# Patient Record
Sex: MALE | Race: BLACK OR AFRICAN AMERICAN | NOT HISPANIC OR LATINO | Employment: OTHER | URBAN - METROPOLITAN AREA
[De-identification: names, ages, dates, MRNs, and addresses within clinical notes are randomized per-mention and may not be internally consistent; named-entity substitution may affect disease eponyms.]

---

## 2018-02-02 ENCOUNTER — HISTORICAL (OUTPATIENT)
Dept: RADIOLOGY | Facility: HOSPITAL | Age: 63
End: 2018-02-02

## 2018-04-19 ENCOUNTER — HOSPITAL ENCOUNTER (OUTPATIENT)
Dept: INTENSIVE CARE | Facility: HOSPITAL | Age: 63
End: 2018-04-20
Attending: INTERNAL MEDICINE | Admitting: INTERNAL MEDICINE

## 2018-04-19 LAB
ABS NEUT (OLG): 6.32 X10(3)/MCL (ref 2.1–9.2)
ALBUMIN SERPL-MCNC: 3.6 GM/DL (ref 3.4–5)
ALBUMIN/GLOB SERPL: 1 RATIO (ref 1–2)
ALP SERPL-CCNC: 132 UNIT/L (ref 45–117)
ALT SERPL-CCNC: 68 UNIT/L (ref 12–78)
AST SERPL-CCNC: 106 UNIT/L (ref 15–37)
BASOPHILS # BLD AUTO: 0.03 X10(3)/MCL
BASOPHILS NFR BLD AUTO: 0 %
BILIRUB SERPL-MCNC: 1 MG/DL (ref 0.2–1)
BILIRUBIN DIRECT+TOT PNL SERPL-MCNC: 0.4 MG/DL
BILIRUBIN DIRECT+TOT PNL SERPL-MCNC: 0.6 MG/DL
BUN SERPL-MCNC: 11 MG/DL (ref 7–18)
CALCIUM SERPL-MCNC: 9 MG/DL (ref 8.5–10.1)
CHLORIDE SERPL-SCNC: 101 MMOL/L (ref 98–107)
CO2 SERPL-SCNC: 29 MMOL/L (ref 21–32)
CREAT SERPL-MCNC: 1 MG/DL (ref 0.6–1.3)
EOSINOPHIL # BLD AUTO: 0.07 X10(3)/MCL
EOSINOPHIL NFR BLD AUTO: 1 %
ERYTHROCYTE [DISTWIDTH] IN BLOOD BY AUTOMATED COUNT: 14 % (ref 11.5–14.5)
ETHANOL SERPL-MCNC: 4 MG/DL
GLOBULIN SER-MCNC: 4.1 GM/ML (ref 2.3–3.5)
GLUCOSE SERPL-MCNC: 90 MG/DL (ref 74–106)
HCT VFR BLD AUTO: 41 % (ref 40–51)
HGB BLD-MCNC: 13.8 GM/DL (ref 13.5–17.5)
IMM GRANULOCYTES # BLD AUTO: 0.02 10*3/UL
IMM GRANULOCYTES NFR BLD AUTO: 0 %
LYMPHOCYTES # BLD AUTO: 1.06 X10(3)/MCL
LYMPHOCYTES NFR BLD AUTO: 13 % (ref 13–40)
MAGNESIUM SERPL-MCNC: 1.7 MG/DL (ref 1.8–2.4)
MCH RBC QN AUTO: 31.1 PG (ref 26–34)
MCHC RBC AUTO-ENTMCNC: 33.7 GM/DL (ref 31–37)
MCV RBC AUTO: 92.3 FL (ref 80–100)
MONOCYTES # BLD AUTO: 0.78 X10(3)/MCL
MONOCYTES NFR BLD AUTO: 9 % (ref 4–12)
NEUTROPHILS # BLD AUTO: 6.32 X10(3)/MCL
NEUTROPHILS NFR BLD AUTO: 76 X10(3)/MCL
PHOSPHATE SERPL-MCNC: 3.5 MG/DL (ref 2.5–4.9)
PLATELET # BLD AUTO: 148 X10(3)/MCL (ref 130–400)
PMV BLD AUTO: 10.2 FL (ref 7.4–10.4)
POC TROPONIN: 0.01 NG/ML (ref 0–0.08)
POTASSIUM SERPL-SCNC: 3.6 MMOL/L (ref 3.5–5.1)
PROT SERPL-MCNC: 7.7 GM/DL (ref 6.4–8.2)
RBC # BLD AUTO: 4.44 X10(6)/MCL (ref 4.5–5.9)
SODIUM SERPL-SCNC: 136 MMOL/L (ref 136–145)
WBC # SPEC AUTO: 8.3 X10(3)/MCL (ref 4.5–11)

## 2018-04-20 LAB
ABS NEUT (OLG): 4.03 X10(3)/MCL (ref 2.1–9.2)
ALBUMIN SERPL-MCNC: 3.5 GM/DL (ref 3.4–5)
ALBUMIN/GLOB SERPL: 1 RATIO (ref 1–2)
ALP SERPL-CCNC: 135 UNIT/L (ref 45–117)
ALT SERPL-CCNC: 64 UNIT/L (ref 12–78)
AST SERPL-CCNC: 86 UNIT/L (ref 15–37)
BASOPHILS # BLD AUTO: 0.03 X10(3)/MCL
BASOPHILS NFR BLD AUTO: 0 %
BILIRUB SERPL-MCNC: 1.7 MG/DL (ref 0.2–1)
BILIRUBIN DIRECT+TOT PNL SERPL-MCNC: 0.6 MG/DL
BILIRUBIN DIRECT+TOT PNL SERPL-MCNC: 1.1 MG/DL
BUN SERPL-MCNC: 13 MG/DL (ref 7–18)
CALCIUM SERPL-MCNC: 8.9 MG/DL (ref 8.5–10.1)
CHLORIDE SERPL-SCNC: 102 MMOL/L (ref 98–107)
CO2 SERPL-SCNC: 28 MMOL/L (ref 21–32)
CREAT SERPL-MCNC: 1 MG/DL (ref 0.6–1.3)
EOSINOPHIL # BLD AUTO: 0.1 X10(3)/MCL
EOSINOPHIL NFR BLD AUTO: 1 %
ERYTHROCYTE [DISTWIDTH] IN BLOOD BY AUTOMATED COUNT: 14.2 % (ref 11.5–14.5)
GLOBULIN SER-MCNC: 4.2 GM/ML (ref 2.3–3.5)
GLUCOSE SERPL-MCNC: 91 MG/DL (ref 74–106)
HCT VFR BLD AUTO: 41.8 % (ref 40–51)
HGB BLD-MCNC: 14 GM/DL (ref 13.5–17.5)
IMM GRANULOCYTES # BLD AUTO: 0.01 10*3/UL
IMM GRANULOCYTES NFR BLD AUTO: 0 %
LYMPHOCYTES # BLD AUTO: 1.92 X10(3)/MCL
LYMPHOCYTES NFR BLD AUTO: 27 % (ref 13–40)
MCH RBC QN AUTO: 30.8 PG (ref 26–34)
MCHC RBC AUTO-ENTMCNC: 33.5 GM/DL (ref 31–37)
MCV RBC AUTO: 91.9 FL (ref 80–100)
MONOCYTES # BLD AUTO: 0.91 X10(3)/MCL
MONOCYTES NFR BLD AUTO: 13 % (ref 4–12)
NEUTROPHILS # BLD AUTO: 4.03 X10(3)/MCL
NEUTROPHILS NFR BLD AUTO: 58 X10(3)/MCL
PLATELET # BLD AUTO: 149 X10(3)/MCL (ref 130–400)
PMV BLD AUTO: 10.9 FL (ref 7.4–10.4)
POTASSIUM SERPL-SCNC: 3.7 MMOL/L (ref 3.5–5.1)
PROT SERPL-MCNC: 7.7 GM/DL (ref 6.4–8.2)
RBC # BLD AUTO: 4.55 X10(6)/MCL (ref 4.5–5.9)
SODIUM SERPL-SCNC: 136 MMOL/L (ref 136–145)
WBC # SPEC AUTO: 7 X10(3)/MCL (ref 4.5–11)

## 2018-07-03 ENCOUNTER — HISTORICAL (OUTPATIENT)
Dept: RESPIRATORY THERAPY | Facility: HOSPITAL | Age: 63
End: 2018-07-03

## 2018-07-25 ENCOUNTER — HISTORICAL (OUTPATIENT)
Dept: ADMINISTRATIVE | Facility: HOSPITAL | Age: 63
End: 2018-07-25

## 2018-07-25 LAB
INR PPP: 2.94 (ref 0.9–1.2)
PROTHROMBIN TIME: 29.2 SECOND(S) (ref 11.9–14.4)

## 2018-11-29 ENCOUNTER — HISTORICAL (OUTPATIENT)
Dept: CARDIOLOGY | Facility: CLINIC | Age: 63
End: 2018-11-29

## 2019-02-06 ENCOUNTER — HOSPITAL ENCOUNTER (OUTPATIENT)
Dept: MEDSURG UNIT | Facility: HOSPITAL | Age: 64
End: 2019-02-09
Attending: INTERNAL MEDICINE | Admitting: INTERNAL MEDICINE

## 2019-02-06 LAB
ABS NEUT (OLG): 4.1 X10(3)/MCL (ref 2.1–9.2)
ALBUMIN SERPL-MCNC: 3.6 GM/DL (ref 3.4–5)
ALBUMIN/GLOB SERPL: 0.8 RATIO (ref 1.1–2)
ALP SERPL-CCNC: 105 UNIT/L (ref 45–117)
ALT SERPL-CCNC: 33 UNIT/L (ref 12–78)
APPEARANCE, UA: ABNORMAL
APTT PPP: 28.1 SECOND(S) (ref 23.3–37)
AST SERPL-CCNC: 43 UNIT/L (ref 15–37)
BACTERIA #/AREA URNS AUTO: ABNORMAL /[HPF]
BASOPHILS # BLD AUTO: 0.03 X10(3)/MCL
BASOPHILS NFR BLD AUTO: 0 %
BILIRUB SERPL-MCNC: 0.7 MG/DL (ref 0.2–1)
BILIRUB UR QL STRIP: NEGATIVE
BILIRUBIN DIRECT+TOT PNL SERPL-MCNC: 0.3 MG/DL
BILIRUBIN DIRECT+TOT PNL SERPL-MCNC: 0.4 MG/DL
BUN SERPL-MCNC: 13 MG/DL (ref 7–18)
CALCIUM SERPL-MCNC: 8.9 MG/DL (ref 8.5–10.1)
CHLORIDE SERPL-SCNC: 102 MMOL/L (ref 98–107)
CK MB SERPL-MCNC: <1 NG/ML (ref 1–3.6)
CK SERPL-CCNC: 59 UNIT/L (ref 39–308)
CO2 SERPL-SCNC: 27 MMOL/L (ref 21–32)
COLOR UR: YELLOW
CREAT SERPL-MCNC: 0.8 MG/DL (ref 0.6–1.3)
EOSINOPHIL # BLD AUTO: 0.1 X10(3)/MCL
EOSINOPHIL NFR BLD AUTO: 2 %
ERYTHROCYTE [DISTWIDTH] IN BLOOD BY AUTOMATED COUNT: 15.5 % (ref 11.5–14.5)
GLOBULIN SER-MCNC: 4.3 GM/ML (ref 2.3–3.5)
GLUCOSE (UA): NORMAL
GLUCOSE SERPL-MCNC: 80 MG/DL (ref 74–106)
HCT VFR BLD AUTO: 38.9 % (ref 40–51)
HGB BLD-MCNC: 13 GM/DL (ref 13.5–17.5)
HGB UR QL STRIP: 0.03 MG/DL
HYALINE CASTS #/AREA URNS LPF: ABNORMAL /[LPF]
IMM GRANULOCYTES # BLD AUTO: 0.02 10*3/UL
IMM GRANULOCYTES NFR BLD AUTO: 0 %
INR PPP: 1.08 (ref 0.9–1.2)
KETONES UR QL STRIP: NEGATIVE
LEUKOCYTE ESTERASE UR QL STRIP: 500 LEU/UL
LYMPHOCYTES # BLD AUTO: 1.66 X10(3)/MCL
LYMPHOCYTES NFR BLD AUTO: 25 % (ref 13–40)
MAGNESIUM SERPL-MCNC: 1.9 MG/DL (ref 1.6–2.6)
MCH RBC QN AUTO: 32.1 PG (ref 26–34)
MCHC RBC AUTO-ENTMCNC: 33.4 GM/DL (ref 31–37)
MCV RBC AUTO: 96 FL (ref 80–100)
MONOCYTES # BLD AUTO: 0.71 X10(3)/MCL
MONOCYTES NFR BLD AUTO: 11 % (ref 4–12)
NEUTROPHILS # BLD AUTO: 4.1 X10(3)/MCL
NEUTROPHILS NFR BLD AUTO: 62 X10(3)/MCL
NITRITE UR QL STRIP: ABNORMAL
PH UR STRIP: 6 [PH] (ref 4.5–8)
PLATELET # BLD AUTO: 194 X10(3)/MCL (ref 130–400)
PMV BLD AUTO: 10.3 FL (ref 7.4–10.4)
POTASSIUM SERPL-SCNC: 4.1 MMOL/L (ref 3.5–5.1)
PROT SERPL-MCNC: 7.9 GM/DL (ref 6.4–8.2)
PROT UR QL STRIP: 20 MG/DL
PROTHROMBIN TIME: 13.9 SECOND(S) (ref 11.9–14.4)
RBC # BLD AUTO: 4.05 X10(6)/MCL (ref 4.5–5.9)
RBC #/AREA URNS AUTO: ABNORMAL /[HPF]
SODIUM SERPL-SCNC: 137 MMOL/L (ref 136–145)
SP GR UR STRIP: 1.01 (ref 1–1.03)
SQUAMOUS #/AREA URNS LPF: ABNORMAL /[LPF]
TROPONIN I SERPL-MCNC: <0.015 NG/ML (ref 0–0.05)
UROBILINOGEN UR STRIP-ACNC: NORMAL
WBC # SPEC AUTO: 6.6 X10(3)/MCL (ref 4.5–11)
WBC #/AREA URNS AUTO: >=100 /HPF

## 2019-02-07 LAB
ABS NEUT (OLG): 3.44 X10(3)/MCL (ref 2.1–9.2)
BASOPHILS # BLD AUTO: 0.03 X10(3)/MCL
BASOPHILS NFR BLD AUTO: 0 %
BUN SERPL-MCNC: 13 MG/DL (ref 7–18)
CALCIUM SERPL-MCNC: 9.1 MG/DL (ref 8.5–10.1)
CHLORIDE SERPL-SCNC: 99 MMOL/L (ref 98–107)
CHOLEST SERPL-MCNC: 192 MG/DL
CHOLEST/HDLC SERPL: 1.5 {RATIO} (ref 0–5)
CO2 SERPL-SCNC: 31 MMOL/L (ref 21–32)
COLOR STL: NORMAL
CONSISTENCY STL: NORMAL
CREAT SERPL-MCNC: 1 MG/DL (ref 0.6–1.3)
CREAT/UREA NIT SERPL: 13
EOSINOPHIL # BLD AUTO: 0.08 X10(3)/MCL
EOSINOPHIL NFR BLD AUTO: 1 %
ERYTHROCYTE [DISTWIDTH] IN BLOOD BY AUTOMATED COUNT: 15.1 % (ref 11.5–14.5)
GLUCOSE SERPL-MCNC: 84 MG/DL (ref 74–106)
HCT VFR BLD AUTO: 41.7 % (ref 40–51)
HDLC SERPL-MCNC: 129 MG/DL
HEMOCCULT SP1 STL QL: NEGATIVE
HEMOCCULT SP2 STL QL: NEGATIVE
HGB BLD-MCNC: 13.8 GM/DL (ref 13.5–17.5)
IMM GRANULOCYTES # BLD AUTO: 0.02 10*3/UL
IMM GRANULOCYTES NFR BLD AUTO: 0 %
INR PPP: 1.33 (ref 0.9–1.2)
LDLC SERPL CALC-MCNC: 46 MG/DL (ref 0–130)
LYMPHOCYTES # BLD AUTO: 1.34 X10(3)/MCL
LYMPHOCYTES NFR BLD AUTO: 24 % (ref 13–40)
MCH RBC QN AUTO: 31.6 PG (ref 26–34)
MCHC RBC AUTO-ENTMCNC: 33.1 GM/DL (ref 31–37)
MCV RBC AUTO: 95.4 FL (ref 80–100)
MONOCYTES # BLD AUTO: 0.78 X10(3)/MCL
MONOCYTES NFR BLD AUTO: 14 % (ref 4–12)
NEUTROPHILS # BLD AUTO: 3.44 X10(3)/MCL
NEUTROPHILS NFR BLD AUTO: 60 X10(3)/MCL
PLATELET # BLD AUTO: 173 X10(3)/MCL (ref 130–400)
PMV BLD AUTO: 10.9 FL (ref 7.4–10.4)
POTASSIUM SERPL-SCNC: 4 MMOL/L (ref 3.5–5.1)
PROTHROMBIN TIME: 16.4 SECOND(S) (ref 11.9–14.4)
RBC # BLD AUTO: 4.37 X10(6)/MCL (ref 4.5–5.9)
SODIUM SERPL-SCNC: 135 MMOL/L (ref 136–145)
T4 FREE SERPL-MCNC: 1.01 NG/DL (ref 0.76–1.46)
TRIGL SERPL-MCNC: 85 MG/DL
TSH SERPL-ACNC: 1.54 MIU/L (ref 0.36–3.74)
VLDLC SERPL CALC-MCNC: 17 MG/DL
WBC # SPEC AUTO: 5.7 X10(3)/MCL (ref 4.5–11)

## 2019-02-08 LAB
ABS NEUT (OLG): 4.22 X10(3)/MCL (ref 2.1–9.2)
BASOPHILS # BLD AUTO: 0.02 X10(3)/MCL
BASOPHILS NFR BLD AUTO: 0 %
BUN SERPL-MCNC: 14 MG/DL (ref 7–18)
CALCIUM SERPL-MCNC: 8.8 MG/DL (ref 8.5–10.1)
CHLORIDE SERPL-SCNC: 101 MMOL/L (ref 98–107)
CO2 SERPL-SCNC: 28 MMOL/L (ref 21–32)
CREAT SERPL-MCNC: 0.9 MG/DL (ref 0.6–1.3)
CREAT/UREA NIT SERPL: 16
EOSINOPHIL # BLD AUTO: 0.09 X10(3)/MCL
EOSINOPHIL NFR BLD AUTO: 1 %
ERYTHROCYTE [DISTWIDTH] IN BLOOD BY AUTOMATED COUNT: 14.7 % (ref 11.5–14.5)
GLUCOSE SERPL-MCNC: 97 MG/DL (ref 74–106)
HCT VFR BLD AUTO: 39.8 % (ref 40–51)
HGB BLD-MCNC: 13.3 GM/DL (ref 13.5–17.5)
IMM GRANULOCYTES # BLD AUTO: 0.02 10*3/UL
IMM GRANULOCYTES NFR BLD AUTO: 0 %
INR PPP: 1.58 (ref 0.9–1.2)
LYMPHOCYTES # BLD AUTO: 1.43 X10(3)/MCL
LYMPHOCYTES NFR BLD AUTO: 22 % (ref 13–40)
MCH RBC QN AUTO: 32 PG (ref 26–34)
MCHC RBC AUTO-ENTMCNC: 33.4 GM/DL (ref 31–37)
MCV RBC AUTO: 95.9 FL (ref 80–100)
MONOCYTES # BLD AUTO: 0.65 X10(3)/MCL
MONOCYTES NFR BLD AUTO: 10 % (ref 4–12)
NEUTROPHILS # BLD AUTO: 4.22 X10(3)/MCL
NEUTROPHILS NFR BLD AUTO: 66 X10(3)/MCL
PLATELET # BLD AUTO: 159 X10(3)/MCL (ref 130–400)
PMV BLD AUTO: 10.8 FL (ref 7.4–10.4)
POTASSIUM SERPL-SCNC: 3.8 MMOL/L (ref 3.5–5.1)
PROTHROMBIN TIME: 18.8 SECOND(S) (ref 11.9–14.4)
RBC # BLD AUTO: 4.15 X10(6)/MCL (ref 4.5–5.9)
SODIUM SERPL-SCNC: 136 MMOL/L (ref 136–145)
WBC # SPEC AUTO: 6.4 X10(3)/MCL (ref 4.5–11)

## 2019-02-09 LAB
ABS NEUT (OLG): 5.38 X10(3)/MCL (ref 2.1–9.2)
BASOPHILS # BLD AUTO: 0.03 X10(3)/MCL
BASOPHILS NFR BLD AUTO: 0 %
BUN SERPL-MCNC: 11 MG/DL (ref 7–18)
CALCIUM SERPL-MCNC: 8.6 MG/DL (ref 8.5–10.1)
CHLORIDE SERPL-SCNC: 102 MMOL/L (ref 98–107)
CO2 SERPL-SCNC: 29 MMOL/L (ref 21–32)
CREAT SERPL-MCNC: 0.8 MG/DL (ref 0.6–1.3)
CREAT/UREA NIT SERPL: 14
EOSINOPHIL # BLD AUTO: 0.12 X10(3)/MCL
EOSINOPHIL NFR BLD AUTO: 2 %
ERYTHROCYTE [DISTWIDTH] IN BLOOD BY AUTOMATED COUNT: 14.9 % (ref 11.5–14.5)
GLUCOSE SERPL-MCNC: 97 MG/DL (ref 74–106)
HCT VFR BLD AUTO: 37.5 % (ref 40–51)
HGB BLD-MCNC: 12.4 GM/DL (ref 13.5–17.5)
IMM GRANULOCYTES # BLD AUTO: 0.02 10*3/UL
IMM GRANULOCYTES NFR BLD AUTO: 0 %
INR PPP: 1.86 (ref 0.9–1.2)
LYMPHOCYTES # BLD AUTO: 1.58 X10(3)/MCL
LYMPHOCYTES NFR BLD AUTO: 20 % (ref 13–40)
MCH RBC QN AUTO: 31.7 PG (ref 26–34)
MCHC RBC AUTO-ENTMCNC: 33.1 GM/DL (ref 31–37)
MCV RBC AUTO: 95.9 FL (ref 80–100)
MONOCYTES # BLD AUTO: 0.73 X10(3)/MCL
MONOCYTES NFR BLD AUTO: 9 % (ref 4–12)
NEUTROPHILS # BLD AUTO: 5.38 X10(3)/MCL
NEUTROPHILS NFR BLD AUTO: 68 X10(3)/MCL
PLATELET # BLD AUTO: 144 X10(3)/MCL (ref 130–400)
PMV BLD AUTO: 11.2 FL (ref 7.4–10.4)
POTASSIUM SERPL-SCNC: 3.8 MMOL/L (ref 3.5–5.1)
PROTHROMBIN TIME: 21.4 SECOND(S) (ref 11.9–14.4)
RBC # BLD AUTO: 3.91 X10(6)/MCL (ref 4.5–5.9)
SODIUM SERPL-SCNC: 138 MMOL/L (ref 136–145)
WBC # SPEC AUTO: 7.9 X10(3)/MCL (ref 4.5–11)

## 2020-01-31 ENCOUNTER — HISTORICAL (OUTPATIENT)
Dept: INTERNAL MEDICINE | Facility: CLINIC | Age: 65
End: 2020-01-31

## 2020-01-31 LAB
ABS NEUT (OLG): 1.82 X10(3)/MCL (ref 2.1–9.2)
ALBUMIN SERPL-MCNC: 3.7 GM/DL (ref 3.4–5)
ALBUMIN/GLOB SERPL: 0.9 RATIO (ref 1.1–2)
ALP SERPL-CCNC: 120 UNIT/L (ref 45–117)
ALT SERPL-CCNC: 24 UNIT/L (ref 12–78)
AST SERPL-CCNC: 30 UNIT/L (ref 15–37)
BASOPHILS # BLD AUTO: 0 X10(3)/MCL (ref 0–0.2)
BASOPHILS NFR BLD AUTO: 1 %
BILIRUB SERPL-MCNC: 0.6 MG/DL (ref 0.2–1)
BILIRUBIN DIRECT+TOT PNL SERPL-MCNC: 0.3 MG/DL
BILIRUBIN DIRECT+TOT PNL SERPL-MCNC: 0.3 MG/DL (ref 0–0.2)
BUN SERPL-MCNC: 10 MG/DL (ref 7–18)
CALCIUM SERPL-MCNC: 9.1 MG/DL (ref 8.5–10.1)
CHLORIDE SERPL-SCNC: 108 MMOL/L (ref 98–107)
CHOLEST SERPL-MCNC: 185 MG/DL
CHOLEST/HDLC SERPL: 2 {RATIO} (ref 0–5)
CO2 SERPL-SCNC: 31 MMOL/L (ref 21–32)
CREAT SERPL-MCNC: 0.9 MG/DL (ref 0.6–1.3)
EOSINOPHIL # BLD AUTO: 0.1 X10(3)/MCL (ref 0–0.9)
EOSINOPHIL NFR BLD AUTO: 3 %
ERYTHROCYTE [DISTWIDTH] IN BLOOD BY AUTOMATED COUNT: 16.8 % (ref 11.5–14.5)
EST. AVERAGE GLUCOSE BLD GHB EST-MCNC: 117 MG/DL
FOLATE SERPL-MCNC: 11.7 NG/ML (ref 3.1–17.5)
GLOBULIN SER-MCNC: 4.1 GM/ML (ref 2.3–3.5)
GLUCOSE SERPL-MCNC: 71 MG/DL (ref 74–106)
HBA1C MFR BLD: 5.7 % (ref 4.2–6.3)
HCT VFR BLD AUTO: 43.5 % (ref 40–51)
HDLC SERPL-MCNC: 94 MG/DL (ref 40–59)
HGB BLD-MCNC: 13.8 GM/DL (ref 13.5–17.5)
IMM GRANULOCYTES # BLD AUTO: 0.01 10*3/UL
IMM GRANULOCYTES NFR BLD AUTO: 0 %
LDLC SERPL CALC-MCNC: 77 MG/DL
LYMPHOCYTES # BLD AUTO: 1.3 X10(3)/MCL (ref 0.6–4.6)
LYMPHOCYTES NFR BLD AUTO: 35 %
MCH RBC QN AUTO: 31.1 PG (ref 26–34)
MCHC RBC AUTO-ENTMCNC: 31.7 GM/DL (ref 31–37)
MCV RBC AUTO: 98 FL (ref 80–100)
MONOCYTES # BLD AUTO: 0.5 X10(3)/MCL (ref 0.1–1.3)
MONOCYTES NFR BLD AUTO: 14 %
NEUTROPHILS # BLD AUTO: 1.82 X10(3)/MCL (ref 2.1–9.2)
NEUTROPHILS NFR BLD AUTO: 48 %
PLATELET # BLD AUTO: 173 X10(3)/MCL (ref 130–400)
PMV BLD AUTO: 10.6 FL (ref 7.4–10.4)
POTASSIUM SERPL-SCNC: 4.5 MMOL/L (ref 3.5–5.1)
PROT SERPL-MCNC: 7.8 GM/DL (ref 6.4–8.2)
RBC # BLD AUTO: 4.44 X10(6)/MCL (ref 4.5–5.9)
SODIUM SERPL-SCNC: 142 MMOL/L (ref 136–145)
TRIGL SERPL-MCNC: 70 MG/DL
VIT B12 SERPL-MCNC: 582 PG/ML (ref 193–986)
VLDLC SERPL CALC-MCNC: 14 MG/DL
WBC # SPEC AUTO: 3.8 X10(3)/MCL (ref 4.5–11)

## 2020-10-07 ENCOUNTER — HISTORICAL (OUTPATIENT)
Dept: INTERNAL MEDICINE | Facility: CLINIC | Age: 65
End: 2020-10-07

## 2020-10-07 LAB
ABS NEUT (OLG): 2.85 X10(3)/MCL (ref 2.1–9.2)
ALBUMIN SERPL-MCNC: 4 GM/DL (ref 3.4–5)
ALBUMIN/GLOB SERPL: 0.9 RATIO (ref 1.1–2)
ALP SERPL-CCNC: 144 UNIT/L (ref 45–117)
ALT SERPL-CCNC: 74 UNIT/L (ref 12–78)
AST SERPL-CCNC: 118 UNIT/L (ref 15–37)
BASOPHILS # BLD AUTO: 0 X10(3)/MCL (ref 0–0.2)
BASOPHILS NFR BLD AUTO: 1 %
BILIRUB SERPL-MCNC: 1.1 MG/DL (ref 0.2–1)
BILIRUBIN DIRECT+TOT PNL SERPL-MCNC: 0.5 MG/DL (ref 0–0.2)
BILIRUBIN DIRECT+TOT PNL SERPL-MCNC: 0.6 MG/DL
BUN SERPL-MCNC: 6 MG/DL (ref 7–18)
CALCIUM SERPL-MCNC: 9.9 MG/DL (ref 8.5–10.1)
CHLORIDE SERPL-SCNC: 100 MMOL/L (ref 98–107)
CHOLEST SERPL-MCNC: 171 MG/DL
CHOLEST/HDLC SERPL: 1.4 {RATIO} (ref 0–5)
CO2 SERPL-SCNC: 30 MMOL/L (ref 21–32)
CREAT SERPL-MCNC: 0.8 MG/DL (ref 0.6–1.3)
DEPRECATED CALCIDIOL+CALCIFEROL SERPL-MC: 37.3 NG/ML (ref 30–80)
EOSINOPHIL # BLD AUTO: 0.1 X10(3)/MCL (ref 0–0.9)
EOSINOPHIL NFR BLD AUTO: 2 %
ERYTHROCYTE [DISTWIDTH] IN BLOOD BY AUTOMATED COUNT: 13.8 % (ref 11.5–14.5)
EST. AVERAGE GLUCOSE BLD GHB EST-MCNC: 117 MG/DL
FOLATE SERPL-MCNC: 9.7 NG/ML (ref 3.1–17.5)
GLOBULIN SER-MCNC: 4.4 GM/ML (ref 2.3–3.5)
GLUCOSE SERPL-MCNC: 79 MG/DL (ref 74–106)
HBA1C MFR BLD: 5.7 % (ref 4.2–6.3)
HCT VFR BLD AUTO: 42.1 % (ref 40–51)
HDLC SERPL-MCNC: 123 MG/DL (ref 40–59)
HGB BLD-MCNC: 13.8 GM/DL (ref 13.5–17.5)
IMM GRANULOCYTES # BLD AUTO: 0.02 10*3/UL
IMM GRANULOCYTES NFR BLD AUTO: 0 %
LDLC SERPL CALC-MCNC: 37 MG/DL
LYMPHOCYTES # BLD AUTO: 1.5 X10(3)/MCL (ref 0.6–4.6)
LYMPHOCYTES NFR BLD AUTO: 30 %
MCH RBC QN AUTO: 30.9 PG (ref 26–34)
MCHC RBC AUTO-ENTMCNC: 32.8 GM/DL (ref 31–37)
MCV RBC AUTO: 94.2 FL (ref 80–100)
MONOCYTES # BLD AUTO: 0.5 X10(3)/MCL (ref 0.1–1.3)
MONOCYTES NFR BLD AUTO: 10 %
NEUTROPHILS # BLD AUTO: 2.85 X10(3)/MCL (ref 2.1–9.2)
NEUTROPHILS NFR BLD AUTO: 58 %
PLATELET # BLD AUTO: 171 X10(3)/MCL (ref 130–400)
PMV BLD AUTO: 10.8 FL (ref 7.4–10.4)
POTASSIUM SERPL-SCNC: 4.3 MMOL/L (ref 3.5–5.1)
PROT SERPL-MCNC: 8.4 GM/DL (ref 6.4–8.2)
RBC # BLD AUTO: 4.47 X10(6)/MCL (ref 4.5–5.9)
SODIUM SERPL-SCNC: 137 MMOL/L (ref 136–145)
TRIGL SERPL-MCNC: 56 MG/DL
VIT B12 SERPL-MCNC: 1096 PG/ML (ref 193–986)
VLDLC SERPL CALC-MCNC: 11 MG/DL
WBC # SPEC AUTO: 5 X10(3)/MCL (ref 4.5–11)

## 2021-07-08 ENCOUNTER — HISTORICAL (OUTPATIENT)
Dept: RESPIRATORY THERAPY | Facility: HOSPITAL | Age: 66
End: 2021-07-08

## 2021-08-24 ENCOUNTER — HISTORICAL (OUTPATIENT)
Dept: ADMINISTRATIVE | Facility: HOSPITAL | Age: 66
End: 2021-08-24

## 2021-08-24 LAB — SARS-COV-2 RNA RESP QL NAA+PROBE: NOT DETECTED

## 2021-09-13 ENCOUNTER — HISTORICAL (OUTPATIENT)
Dept: ADMINISTRATIVE | Facility: HOSPITAL | Age: 66
End: 2021-09-13

## 2021-10-05 ENCOUNTER — HISTORICAL (OUTPATIENT)
Dept: ADMINISTRATIVE | Facility: HOSPITAL | Age: 66
End: 2021-10-05

## 2021-11-15 ENCOUNTER — HOSPITAL ENCOUNTER (OUTPATIENT)
Dept: MEDSURG UNIT | Facility: HOSPITAL | Age: 66
End: 2021-11-17
Attending: INTERNAL MEDICINE | Admitting: INTERNAL MEDICINE

## 2021-11-15 LAB
ABS NEUT (OLG): 3.14 X10(3)/MCL (ref 2.1–9.2)
ALBUMIN SERPL-MCNC: 3.7 GM/DL (ref 3.4–4.8)
ALBUMIN/GLOB SERPL: 1.1 RATIO (ref 1.1–2)
ALP SERPL-CCNC: 103 UNIT/L (ref 40–150)
ALT SERPL-CCNC: 11 UNIT/L (ref 0–55)
AMPHET UR QL SCN: NEGATIVE
APPEARANCE, UA: CLEAR
APTT PPP: 51.7 SECOND(S) (ref 23.3–37)
AST SERPL-CCNC: 24 UNIT/L (ref 5–34)
BACTERIA #/AREA URNS AUTO: ABNORMAL /HPF
BARBITURATE SCN PRESENT UR: NEGATIVE
BASOPHILS # BLD AUTO: 0 X10(3)/MCL (ref 0–0.2)
BASOPHILS NFR BLD AUTO: 0 %
BENZODIAZ UR QL SCN: NEGATIVE
BILIRUB SERPL-MCNC: 3.1 MG/DL
BILIRUB UR QL STRIP: NEGATIVE
BILIRUBIN DIRECT+TOT PNL SERPL-MCNC: 1.2 MG/DL (ref 0–0.5)
BILIRUBIN DIRECT+TOT PNL SERPL-MCNC: 1.9 MG/DL (ref 0–0.8)
BNP BLD-MCNC: 1153 PG/ML
BUN SERPL-MCNC: 10.8 MG/DL (ref 8.4–25.7)
BUN SERPL-MCNC: 13.6 MG/DL (ref 8.4–25.7)
CALCIUM SERPL-MCNC: 9.3 MG/DL (ref 8.7–10.5)
CALCIUM SERPL-MCNC: 9.4 MG/DL (ref 8.7–10.5)
CANNABINOIDS UR QL SCN: NEGATIVE
CHLORIDE SERPL-SCNC: 104 MMOL/L (ref 98–107)
CHLORIDE SERPL-SCNC: 104 MMOL/L (ref 98–107)
CK MB SERPL-MCNC: 1.2 NG/ML
CK SERPL-CCNC: 71 U/L (ref 30–200)
CO2 SERPL-SCNC: 26 MMOL/L (ref 23–31)
CO2 SERPL-SCNC: 28 MMOL/L (ref 23–31)
COCAINE UR QL SCN: NEGATIVE
COLOR UR: ABNORMAL
CREAT SERPL-MCNC: 0.88 MG/DL (ref 0.73–1.18)
CREAT SERPL-MCNC: 1.03 MG/DL (ref 0.73–1.18)
CREAT/UREA NIT SERPL: 12
D DIMER PPP IA.FEU-MCNC: 0.55 MCG/ML FEU
EOSINOPHIL # BLD AUTO: 0 X10(3)/MCL (ref 0–0.9)
EOSINOPHIL NFR BLD AUTO: 1 %
ERYTHROCYTE [DISTWIDTH] IN BLOOD BY AUTOMATED COUNT: 16.3 % (ref 11.5–14.5)
EST. AVERAGE GLUCOSE BLD GHB EST-MCNC: 88.2 MG/DL
FENTANYL UR QL SCN: NEGATIVE
FLUAV AG UPPER RESP QL IA.RAPID: NEGATIVE
FLUBV AG UPPER RESP QL IA.RAPID: NEGATIVE
GLOBULIN SER-MCNC: 3.3 GM/DL (ref 2.4–3.5)
GLUCOSE (UA): NEGATIVE
GLUCOSE SERPL-MCNC: 91 MG/DL (ref 82–115)
GLUCOSE SERPL-MCNC: 93 MG/DL (ref 82–115)
HAV IGM SERPL QL IA: NONREACTIVE
HBA1C MFR BLD: 4.7 %
HBV CORE IGM SERPL QL IA: NONREACTIVE
HBV SURFACE AG SERPL QL IA: NONREACTIVE
HCT VFR BLD AUTO: 35.8 % (ref 40–51)
HCV AB SERPL QL IA: NONREACTIVE
HGB BLD-MCNC: 11.1 GM/DL (ref 13.5–17.5)
HGB UR QL STRIP: NEGATIVE
HIV 1+2 AB+HIV1 P24 AG SERPL QL IA: NONREACTIVE
HYALINE CASTS #/AREA URNS LPF: ABNORMAL /LPF
IMM GRANULOCYTES # BLD AUTO: 0.02 10*3/UL
IMM GRANULOCYTES NFR BLD AUTO: 0 %
INR PPP: 3.07 (ref 0.9–1.2)
KETONES UR QL STRIP: NEGATIVE
LEUKOCYTE ESTERASE UR QL STRIP: 500 LEU/UL
LYMPHOCYTES # BLD AUTO: 1.5 X10(3)/MCL (ref 0.6–4.6)
LYMPHOCYTES NFR BLD AUTO: 29 %
MAGNESIUM SERPL-MCNC: 2.1 MG/DL (ref 1.6–2.6)
MCH RBC QN AUTO: 30.3 PG (ref 26–34)
MCHC RBC AUTO-ENTMCNC: 31 GM/DL (ref 31–37)
MCV RBC AUTO: 97.8 FL (ref 80–100)
MDMA UR QL SCN: NEGATIVE
MONOCYTES # BLD AUTO: 0.6 X10(3)/MCL (ref 0.1–1.3)
MONOCYTES NFR BLD AUTO: 11 %
NEUTROPHILS # BLD AUTO: 3.14 X10(3)/MCL (ref 2.1–9.2)
NEUTROPHILS NFR BLD AUTO: 59 %
NITRITE UR QL STRIP: NEGATIVE
NRBC BLD AUTO-RTO: 0 % (ref 0–0.2)
OPIATES UR QL SCN: NEGATIVE
PCP UR QL: NEGATIVE
PH UR STRIP.AUTO: 7.5 [PH] (ref 3–11)
PH UR STRIP: 7.5 [PH] (ref 4.5–8)
PHOSPHATE SERPL-MCNC: 3.3 MG/DL (ref 2.3–4.7)
PLATELET # BLD AUTO: 186 X10(3)/MCL (ref 130–400)
PMV BLD AUTO: 10.9 FL (ref 7.4–10.4)
POTASSIUM SERPL-SCNC: 3.1 MMOL/L (ref 3.5–5.1)
POTASSIUM SERPL-SCNC: 3.2 MMOL/L (ref 3.5–5.1)
PROT SERPL-MCNC: 7 GM/DL (ref 5.8–7.6)
PROT UR QL STRIP: NEGATIVE
PROTHROMBIN TIME: 31.4 SECOND(S) (ref 11.9–14.4)
RBC # BLD AUTO: 3.66 X10(6)/MCL (ref 4.5–5.9)
RBC #/AREA URNS AUTO: ABNORMAL /HPF
SARS-COV-2 RNA RESP QL NAA+PROBE: NOT DETECTED
SODIUM SERPL-SCNC: 141 MMOL/L (ref 136–145)
SODIUM SERPL-SCNC: 144 MMOL/L (ref 136–145)
SP GR UR STRIP: 1 (ref 1–1.03)
SQUAMOUS #/AREA URNS LPF: ABNORMAL /LPF
T PALLIDUM AB SER QL: NONREACTIVE
TROPONIN I SERPL-MCNC: 0.02 NG/ML (ref 0–0.04)
UROBILINOGEN UR STRIP-ACNC: 2 MG/DL
WBC # SPEC AUTO: 5.3 X10(3)/MCL (ref 4.5–11)
WBC #/AREA URNS AUTO: ABNORMAL /HPF

## 2021-11-16 LAB
ABS NEUT (OLG): 2.59 X10(3)/MCL (ref 2.1–9.2)
ALBUMIN SERPL-MCNC: 3.7 GM/DL (ref 3.4–4.8)
ALBUMIN/GLOB SERPL: 1 RATIO (ref 1.1–2)
ALP SERPL-CCNC: 107 UNIT/L (ref 40–150)
ALT SERPL-CCNC: 12 UNIT/L (ref 0–55)
AST SERPL-CCNC: 21 UNIT/L (ref 5–34)
BASOPHILS # BLD AUTO: 0 X10(3)/MCL (ref 0–0.2)
BASOPHILS NFR BLD AUTO: 0 %
BILIRUB SERPL-MCNC: 3.8 MG/DL
BILIRUBIN DIRECT+TOT PNL SERPL-MCNC: 1.2 MG/DL (ref 0–0.5)
BILIRUBIN DIRECT+TOT PNL SERPL-MCNC: 2.6 MG/DL (ref 0–0.8)
BUN SERPL-MCNC: 10.3 MG/DL (ref 8.4–25.7)
CALCIUM SERPL-MCNC: 9.3 MG/DL (ref 8.7–10.5)
CHLORIDE SERPL-SCNC: 106 MMOL/L (ref 98–107)
CHOLEST SERPL-MCNC: 104 MG/DL
CHOLEST/HDLC SERPL: 3 {RATIO} (ref 0–5)
CO2 SERPL-SCNC: 27 MMOL/L (ref 23–31)
CREAT SERPL-MCNC: 0.96 MG/DL (ref 0.73–1.18)
EOSINOPHIL # BLD AUTO: 0 X10(3)/MCL (ref 0–0.9)
EOSINOPHIL NFR BLD AUTO: 1 %
ERYTHROCYTE [DISTWIDTH] IN BLOOD BY AUTOMATED COUNT: 16.4 % (ref 11.5–14.5)
GLOBULIN SER-MCNC: 3.6 GM/DL (ref 2.4–3.5)
GLUCOSE SERPL-MCNC: 82 MG/DL (ref 82–115)
HCT VFR BLD AUTO: 38.2 % (ref 40–51)
HDLC SERPL-MCNC: 41 MG/DL (ref 35–60)
HGB BLD-MCNC: 11.8 GM/DL (ref 13.5–17.5)
IMM GRANULOCYTES # BLD AUTO: 0.01 10*3/UL
IMM GRANULOCYTES NFR BLD AUTO: 0 %
LDLC SERPL CALC-MCNC: 49 MG/DL (ref 50–140)
LYMPHOCYTES # BLD AUTO: 1.6 X10(3)/MCL (ref 0.6–4.6)
LYMPHOCYTES NFR BLD AUTO: 34 %
MAGNESIUM SERPL-MCNC: 2.1 MG/DL (ref 1.6–2.6)
MCH RBC QN AUTO: 30.1 PG (ref 26–34)
MCHC RBC AUTO-ENTMCNC: 30.9 GM/DL (ref 31–37)
MCV RBC AUTO: 97.4 FL (ref 80–100)
MONOCYTES # BLD AUTO: 0.5 X10(3)/MCL (ref 0.1–1.3)
MONOCYTES NFR BLD AUTO: 10 %
NEUTROPHILS # BLD AUTO: 2.59 X10(3)/MCL (ref 2.1–9.2)
NEUTROPHILS NFR BLD AUTO: 54 %
NRBC BLD AUTO-RTO: 0 % (ref 0–0.2)
PHOSPHATE SERPL-MCNC: 2.7 MG/DL (ref 2.3–4.7)
PHOSPHATE SERPL-MCNC: 2.8 MG/DL (ref 2.3–4.7)
PLATELET # BLD AUTO: 190 X10(3)/MCL (ref 130–400)
PMV BLD AUTO: 11 FL (ref 7.4–10.4)
POTASSIUM SERPL-SCNC: 3.1 MMOL/L (ref 3.5–5.1)
PROT SERPL-MCNC: 7.3 GM/DL (ref 5.8–7.6)
RBC # BLD AUTO: 3.92 X10(6)/MCL (ref 4.5–5.9)
SODIUM SERPL-SCNC: 144 MMOL/L (ref 136–145)
TRIGL SERPL-MCNC: 72 MG/DL (ref 34–140)
TSH SERPL-ACNC: 1.55 UIU/ML (ref 0.35–4.94)
VLDLC SERPL CALC-MCNC: 14 MG/DL
WBC # SPEC AUTO: 4.8 X10(3)/MCL (ref 4.5–11)

## 2021-11-17 LAB
ABS NEUT (OLG): 3.17 X10(3)/MCL (ref 2.1–9.2)
ALBUMIN SERPL-MCNC: 3.5 GM/DL (ref 3.4–4.8)
ALBUMIN/GLOB SERPL: 1.1 RATIO (ref 1.1–2)
ALP SERPL-CCNC: 96 UNIT/L (ref 40–150)
ALT SERPL-CCNC: 10 UNIT/L (ref 0–55)
AST SERPL-CCNC: 22 UNIT/L (ref 5–34)
BASOPHILS # BLD AUTO: 0 X10(3)/MCL (ref 0–0.2)
BASOPHILS NFR BLD AUTO: 1 %
BILIRUB SERPL-MCNC: 3.2 MG/DL
BILIRUBIN DIRECT+TOT PNL SERPL-MCNC: 1.1 MG/DL (ref 0–0.5)
BILIRUBIN DIRECT+TOT PNL SERPL-MCNC: 2.1 MG/DL (ref 0–0.8)
BUN SERPL-MCNC: 11.5 MG/DL (ref 8.4–25.7)
CALCIUM SERPL-MCNC: 8.8 MG/DL (ref 8.7–10.5)
CHLORIDE SERPL-SCNC: 101 MMOL/L (ref 98–107)
CO2 SERPL-SCNC: 28 MMOL/L (ref 23–31)
CREAT SERPL-MCNC: 0.94 MG/DL (ref 0.73–1.18)
EOSINOPHIL # BLD AUTO: 0.1 X10(3)/MCL (ref 0–0.9)
EOSINOPHIL NFR BLD AUTO: 1 %
ERYTHROCYTE [DISTWIDTH] IN BLOOD BY AUTOMATED COUNT: 16.2 % (ref 11.5–14.5)
FERRITIN SERPL-MCNC: 124.45 NG/ML (ref 21.81–274.66)
GLOBULIN SER-MCNC: 3.2 GM/DL (ref 2.4–3.5)
GLUCOSE SERPL-MCNC: 83 MG/DL (ref 82–115)
HCT VFR BLD AUTO: 38.2 % (ref 40–51)
HGB BLD-MCNC: 12 GM/DL (ref 13.5–17.5)
IMM GRANULOCYTES # BLD AUTO: 0.01 10*3/UL
IMM GRANULOCYTES NFR BLD AUTO: 0 %
IRON SATN MFR SERPL: 13 % (ref 20–50)
IRON SERPL-MCNC: 42 UG/DL (ref 65–175)
LYMPHOCYTES # BLD AUTO: 1.4 X10(3)/MCL (ref 0.6–4.6)
LYMPHOCYTES NFR BLD AUTO: 28 %
MAGNESIUM SERPL-MCNC: 2.2 MG/DL (ref 1.6–2.6)
MCH RBC QN AUTO: 30.2 PG (ref 26–34)
MCHC RBC AUTO-ENTMCNC: 31.4 GM/DL (ref 31–37)
MCV RBC AUTO: 96.2 FL (ref 80–100)
MONOCYTES # BLD AUTO: 0.5 X10(3)/MCL (ref 0.1–1.3)
MONOCYTES NFR BLD AUTO: 10 %
NEUTROPHILS # BLD AUTO: 3.17 X10(3)/MCL (ref 2.1–9.2)
NEUTROPHILS NFR BLD AUTO: 61 %
NRBC BLD AUTO-RTO: 0 % (ref 0–0.2)
PHOSPHATE SERPL-MCNC: 3.1 MG/DL (ref 2.3–4.7)
PHOSPHATE SERPL-MCNC: 3.1 MG/DL (ref 2.3–4.7)
PLATELET # BLD AUTO: 195 X10(3)/MCL (ref 130–400)
PMV BLD AUTO: 10.7 FL (ref 7.4–10.4)
POTASSIUM SERPL-SCNC: 3.2 MMOL/L (ref 3.5–5.1)
PROT SERPL-MCNC: 6.7 GM/DL (ref 5.8–7.6)
RBC # BLD AUTO: 3.97 X10(6)/MCL (ref 4.5–5.9)
SODIUM SERPL-SCNC: 140 MMOL/L (ref 136–145)
TIBC SERPL-MCNC: 271 UG/DL (ref 69–240)
TIBC SERPL-MCNC: 313 UG/DL (ref 250–450)
TRANSFERRIN SERPL-MCNC: 279 MG/DL (ref 163–344)
WBC # SPEC AUTO: 5.2 X10(3)/MCL (ref 4.5–11)

## 2021-12-30 ENCOUNTER — HISTORICAL (OUTPATIENT)
Dept: CARDIOLOGY | Facility: HOSPITAL | Age: 66
End: 2021-12-30

## 2022-01-05 ENCOUNTER — HISTORICAL (OUTPATIENT)
Dept: CARDIOLOGY | Facility: HOSPITAL | Age: 67
End: 2022-01-05

## 2022-01-05 LAB — SARS-COV-2 AG RESP QL IA.RAPID: NEGATIVE

## 2022-01-13 ENCOUNTER — HISTORICAL (OUTPATIENT)
Dept: ADMINISTRATIVE | Facility: HOSPITAL | Age: 67
End: 2022-01-13

## 2022-03-10 ENCOUNTER — HISTORICAL (OUTPATIENT)
Dept: RADIOLOGY | Facility: HOSPITAL | Age: 67
End: 2022-03-10

## 2022-03-10 LAB — CBG: 97 (ref 70–115)

## 2022-04-10 ENCOUNTER — HISTORICAL (OUTPATIENT)
Dept: ADMINISTRATIVE | Facility: HOSPITAL | Age: 67
End: 2022-04-10
Payer: MEDICARE

## 2022-04-30 VITALS
DIASTOLIC BLOOD PRESSURE: 62 MMHG | DIASTOLIC BLOOD PRESSURE: 83 MMHG | OXYGEN SATURATION: 100 % | WEIGHT: 196.88 LBS | BODY MASS INDEX: 28.46 KG/M2 | HEIGHT: 72 IN | SYSTOLIC BLOOD PRESSURE: 128 MMHG | DIASTOLIC BLOOD PRESSURE: 56 MMHG | SYSTOLIC BLOOD PRESSURE: 87 MMHG | HEIGHT: 72 IN | WEIGHT: 210.13 LBS | HEIGHT: 72 IN | BODY MASS INDEX: 26.67 KG/M2 | SYSTOLIC BLOOD PRESSURE: 102 MMHG | BODY MASS INDEX: 27.41 KG/M2 | WEIGHT: 202.38 LBS

## 2022-04-30 NOTE — ED PROVIDER NOTES
Patient:   Ran Reyes Jr             MRN: [id number]            FIN: [id number]               Age:   62 years     Sex:  Male     :  1955   Associated Diagnoses:   Atypical syncope   Author:   Justine GARCIA, Obie JACOBS      Basic Information   Time seen: Immediately upon arrival.   History source: Patient.   Arrival mode: Ambulance.   History limitation: None.   Additional information: Chief Complaint from Nursing Triage Note : Chief Complaint   2018 12:45 CDT      Chief Complaint           brought in via [facility] for syncopal episode; pt reports head injury; abrasions noted to right scalp; reports headache at this time;  .      History of Present Illness   The patient presents with syncope.  The onset was just prior to arrival.  The course/duration of symptoms is episodic: with a single episode.  The location where the incident occurred was in the street.  The exacerbating factor is none.  The relieving factor is none.  Risk factors consist of coronary artery disease, hypertension and dysrhythmia.  Prior episodes: none.  Therapy today: none.  Preceding symptoms: lightheaded vision change.  Associated symptoms: headache, dizziness and Neck pain, visual disturbance.  Associated injury to the contusion, abrasion location: Rt temporo-parietal.        Review of Systems   Constitutional symptoms:  No fever, no chills, no sweats, no weakness.    Skin symptoms:  Abrasions, no rash, no lesion.    Eye symptoms:  Negative except as documented in HPI.   Respiratory symptoms:  No shortness of breath, no orthopnea, no cough, no sputum production.    Cardiovascular symptoms:  No chest pain, no palpitations, no tachycardia, no syncope, no diaphoresis, no peripheral edema.    Gastrointestinal symptoms:  No abdominal pain, no nausea, no vomiting, no diarrhea, no constipation, no rectal bleeding.    Genitourinary symptoms:  No dysuria, no hematuria, no testicular pain.    Musculoskeletal symptoms:  Negative  except as documented in HPI, No Joint pain,    Neurologic symptoms:  Negative except as documented in HPI.   Allergy/immunologic symptoms:  Negative except as documented in HPI.             Additional review of systems information: All other systems reviewed and otherwise negative.      Health Status   Allergies:    Allergic Reactions (Selected)  No Known Allergies,    Allergies (1) Active Reaction  No Known Allergies None Documented  .   Medications:  (Selected)   Inpatient Medications  Ordered  Lidocaine Viscous 2% mucous membrane solution: 20 mL, form: Soln, Oral, Pre Op, first dose 05/04/16 11:21:00 CDT, stop date 05/04/16 11:21:00 CDT  Lidocaine Viscous: 20 mL, form: Soln, Oral, Pre Op, first dose 05/04/16 11:27:00 CDT, stop date 05/04/16 11:27:00 CDT  Prescriptions  Prescribed  Coreg 12.5 mg oral tablet: 12.5 mg = 1 tab(s), Oral, BID, # 60 tab(s), 6 Refill(s), Pharmacy: [facility]  Lasix 40 mg oral tablet: 40 mg = 1 tab(s), Oral, Daily, # 30 tab(s), 0 Refill(s), Pharmacy: [facility]  Lasix 40 mg oral tablet: 40 mg = 1 tab(s), Oral, Daily, # 30 tab(s), 2 Refill(s), Pharmacy: [facility]  aspirin 81 mg oral tablet: 81 mg = 1 tab(s), Oral, Daily, # 30 tab(s), 2 Refill(s)  isosorbide MONOnitrate 30 mg oral tablet, Extended Release: 30 mg = 1 tab(s), Oral, qAM, # 90 tab(s), 0 Refill(s), Pharmacy: [facility]  lisinopril 10 mg oral tablet: 10 mg = 1 tab(s), Oral, Daily, # 90 tab(s), 0 Refill(s), Pharmacy: [facility]  simvastatin 40 mg oral tablet: 40 mg = 1 tab(s), Oral, Once a day (at bedtime), # 30 tab(s), 6 Refill(s), Pharmacy: [facility]  spironolactone 25 mg oral tablet: 25 mg = 1 tab(s), Oral, Daily, # 90 tab(s), 0 Refill(s), Pharmacy: [facility].      Past Medical/ Family/ Social History   Medical history:    Active  ATRIAL FIBRILLATION ([id number])  CHF (congestive heart failure) (428.0)  Dyslipidemia (272.4)  HYPERTENSION (997.91).    Surgical history:    Transesophageal Echo (for Surgery) (None) on 2016 at 60 Years.  Comments:  2016 12:08 - Willard BRAGG, Guadalupe SIMON  auto-populated from documented surgical case  cardiac stent..   Family history:    Stroke  Father (HALIE FLORES SR, )  Hypertension.  Mother  .   Social history:    Social & Psychosocial Habits    Alcohol  2014  Use: Current    Type: Beer    Frequency: 3-5 times per week    12/10/2015  Use: Current    Type: Beer    Frequency: 1-2 times per week    Employment/School  12/10/2015  Status: Employed    Highest education: Some college    Exercise  12/10/2015  Self assessment: Fair condition    Home/Environment  12/10/2015  Lives with: Significant other    Living situation: Home/Independent    Home equipment: BLOOD PRESSURE MONITOR    Alcohol abuse in household: No    Substance abuse in household: No    Smoker in household: Yes    Injuries/Abuse/Neglect in household: No    Feels unsafe at home: No    Safe place to go: Yes    Family/Friends available to help: Yes    Nutrition/Health  12/10/2015  Diet description: NO PORK, COUMADIN    Substance Abuse  2014 Risk Assessment: Denies Substance Abuse    2015  Use: Never    12/10/2015  Use: Never    Tobacco  2014  Type: Cigarettes    Comment: 2 ppd - 2014 12:05 - Simona West RN    2015  Use: Light tobacco smoker    12/10/2015  Use: Current some day smoker    Type: Cigarettes    Comment: SMOKE 6-7/WEEK - 12/10/2015 13:31 - Verna RAMOS Aneta    2016  Use: Light tobacco smoker    Type: Cigarettes    Comment: smokes 1-2 cigg a day - 2016 11:48 - Leila BRAGG, Missy FAYE  , Alcohol use: Regularly, Tobacco use: Regularly, Drug use: Denies.   Problem list:    Active Problems (5)  ATRIAL FIBRILLATION   CHF (congestive heart failure)   Dyslipidemia   HYPERTENSION   Tobacco user   .      Physical Examination               Vital Signs   Vital Signs   2018 13:01 CDT       Peripheral Pulse Rate     80 bpm                             Respiratory Rate          20 br/min                             SpO2                      97 %                             Oxygen Therapy            Room air                             Systolic Blood Pressure   104 mmHg                             Diastolic Blood Pressure  73 mmHg    4/19/2018 12:53 CDT      Peripheral Pulse Rate     79 bpm                             Respiratory Rate          19 br/min                             SpO2                      98 %                             Oxygen Therapy            Room air                             Systolic Blood Pressure   112 mmHg                             Diastolic Blood Pressure  77 mmHg    4/19/2018 12:45 CDT      Temperature Oral          36.6 DegC                             Temperature Oral (calculated)             97.88 DegF                             Peripheral Pulse Rate     78 bpm                             Respiratory Rate          20 br/min                             SpO2                      99 %                             Oxygen Therapy            Room air                             Systolic Blood Pressure   104 mmHg                             Diastolic Blood Pressure  77 mmHg  .      Vital Signs (last 24 hrs)_____  Last Charted___________  Temp Oral     36.6 DegC  (APR 19 12:45)  Heart Rate Peripheral   80 bpm  (APR 19 13:01)  Resp Rate         20 br/min  (APR 19 13:01)  SBP      104 mmHg  (APR 19 13:01)  DBP      73 mmHg  (APR 19 13:01)  SpO2      97 %  (APR 19 13:01)  .   Measurements   4/19/2018 12:45 CDT      Weight Dosing             78 kg                             Weight Measured and Calculated in Lbs     171.96 lb                             Weight Estimated          78 kg                             Height/Length Dosing      178 cm                             Height/Length Estimated   178 cm                             Body Mass Index Estimated 24.62 kg/m2  .   Basic  Oxygen Information   4/19/2018 13:01 CDT      SpO2                      97 %                             Oxygen Therapy            Room air    4/19/2018 12:53 CDT      SpO2                      98 %                             Oxygen Therapy            Room air    4/19/2018 12:45 CDT      SpO2                      99 %                             Oxygen Therapy            Room air  .   General:  Alert, no acute distress.    [place] coma scale:  Total score: Total score: 15.   Neurological:  Alert and oriented to person, place, time, and situation, No focal neurological deficit observed, CN II-XII intact, normal motor observed, normal speech observed, normal coordination observed.    Skin:  Warm, dry, pink, no pallor, Rt parieto-temporal abrasion.    Head:  Normocephalic.   Neck:  Supple, trachea midline, no tenderness, no JVD, no carotid bruit.    Eye:  Pupils are equal, round and reactive to light, extraocular movements are intact, normal conjunctiva.    Ears, nose, mouth and throat:  Oral mucosa moist, no pharyngeal erythema or exudate.    Cardiovascular:  No murmur, Normal peripheral perfusion, No edema, irregularly irregular rhythm.    Respiratory:  Respirations are non-labored, breath sounds are equal, Symmetrical chest wall expansion, Breath sounds: Right, base(s), diminished.    Gastrointestinal:  Soft, Nontender, Non distended, Normal bowel sounds, No organomegaly.    Back:  Nontender, Normal range of motion.    Musculoskeletal:  Normal ROM, normal strength, no swelling.    Psychiatric:  Cooperative, appropriate mood & affect.       Medical Decision Making   Rationale:  [place] Syncope Rule    Congestive Heart Failure:  Hematocrit < 30%:  Abnormal EKG:  Shortness of Breath:  Systolic BP < 90 mmHG:    Total: .   Documents reviewed:  Emergency department records, prior records.    Electrocardiogram:  Time 4/19/2018 13:32:00, rate 72, EP Interp, The Rhythm is atrial fibrillation.  , The Axis is normal.  ,  T wave Inversion, I, , V4, , V5, , V6.    Results review:  Reviewed Results: Lab results : Lab View(Date Range: 4/18/2018 0:00 CDT - 4/19/2018 14:20 CDT), Interpretation Labs unremarkable.    Radiology results:  Rad Results (ST)  < 12 hrs   Accession: [id number]  Order: CT Cervical Spine W/O Contrast  Report Dt/Tm: 04/19/2018 14:31  Report:   History: Injury, neck  Comparison studies:  None     Technique:   Axial CT images were obtained through the cervical region.  Coronal and sagittal reconstructions obtained from the axial data.  Automatic exposure control was utilized to limit radiation dose.  Contrast: None.     Radiation Dose:   Total DLP: 533 mGy*cm     DISCUSSION:     Fractures: None.  Alignment: Normal lordosis. No subluxations.  Soft tissues: No abnormalities.     Degenerative changes:  Mild degenerative canal stenosis at C3-4 related to disc osteophyte  complex.  Mild right foraminal narrowing at C3-4 and on the left at C4-5 related  to uncovertebral and facet hypertrophy.     Incidental findings:  Tracheal 1.5 cm diverticulum.     IMPRESSION:     1. No fractures.     2. Ligament, spinal cord and/or vascular abnormalities cannot be  excluded on the basis of this examination.      Accession: [id number]  Order: XR Chest 1 View  Report Dt/Tm: 04/19/2018 14:05  Report:   Indication: Chest pain     Findings: Compared to 9/2/2017. Heart size is normal and lungs are  clear bilaterally. Pulmonary vasculature is normal.     IMPRESSION: No evidence of acute disease.      .   Radiology results:  Reported at  4/19/2018 16:02:00, Magnetic Resonance Imaging, Rad Results (ST)  < 12 hrs   Accession: [id number]  Order: MRI Brain W/O Contrast  Report Dt/Tm: 04/19/2018 16:00  Report:   MRI Brain W/O Contrast     REASON FOR STUDY: Dizziness , vertigo     COMPARISON: None.     TECHNIQUE: Multiplanar imaging was performed through the cranial  region using T1, T2, FLAIR, T2 gradient echo, diffusion and ADC map  sequences.   Study was done without contrast.        FINDINGS:     There is no restricted diffusion or cytotoxic edema. Study is  suboptimal due to motion artifact. Multifocal areas of increased T2  signal involving the cerebral white matter. There is no mass effect.  There is no hydrocephalus. There is no extra-axial fluid collection.  There are old bilateral corona radiata lacunar infarcts. There is  prominent paranasal sinusitis. This involves the ethmoid sinuses and  maxillary sinuses. Craniovertebral junction and sella turcica are  normal.     IMPRESSION:     Nonspecific bilateral cerebral white matter T2 hyperintensities. This  could reflect chronic ischemia or nonspecific demyelination. There are  old bilateral corona radiata lacunar infarcts.     Prominent paranasal sinusitis.      Accession: [id number]  Order: CT Cervical Spine W/O Contrast  Report Dt/Tm: 04/19/2018 14:31  Report:   History: Injury, neck  Comparison studies:  None     Technique:   Axial CT images were obtained through the cervical region.  Coronal and sagittal reconstructions obtained from the axial data.  Automatic exposure control was utilized to limit radiation dose.  Contrast: None.     Radiation Dose:   Total DLP: 533 mGy*cm     DISCUSSION:     Fractures: None.  Alignment: Normal lordosis. No subluxations.  Soft tissues: No abnormalities.     Degenerative changes:  Mild degenerative canal stenosis at C3-4 related to disc osteophyte  complex.  Mild right foraminal narrowing at C3-4 and on the left at C4-5 related  to uncovertebral and facet hypertrophy.     Incidental findings:  Tracheal 1.5 cm diverticulum.     IMPRESSION:     1. No fractures.     2. Ligament, spinal cord and/or vascular abnormalities cannot be  excluded on the basis of this examination.      Accession: [id number]  Order: XR Chest 1 View  Report Dt/Tm: 04/19/2018 14:05  Report:   Indication: Chest pain     Findings: Compared to 9/2/2017. Heart size is normal and lungs  are  clear bilaterally. Pulmonary vasculature is normal.     IMPRESSION: No evidence of acute disease.      .       Impression and Plan   Diagnosis   Atypical syncope ([id number])      Calls-Consults   -  4/19/2018 16:11:00 , Medicine, consult.    Plan   Condition: Stable.    Disposition: Admit time  4/19/2018 16:11:00, Place in Observation Telemetry Unit.    Counseled: Patient, Regarding diagnosis, Regarding diagnostic results, Regarding treatment plan, Patient indicated understanding of instructions.

## 2022-04-30 NOTE — ED PROVIDER NOTES
Patient:   Ran Reyes Jr             MRN: [id number]            FIN: [id number]               Age:   66 years     Sex:  Male     :  1955   Associated Diagnoses:   Acute exacerbation of CHF (congestive heart failure); Volume overload; Hypokalemia   Author:   Memo GARCIA, Jerald Denise      Basic Information   Time seen: Date & time 11/15/2021 13:08:00.   History source: Patient.   Arrival mode: Private vehicle.   History limitation: None.   Additional information: Chief Complaint from Nursing Triage Note : Chief Complaint   11/15/2021 12:55 CST     Chief Complaint           Pt c/o SOB with exertion and BLE swelling, abd swelling, must sleep sitting up x 3 days, hx chf  .      History of Present Illness   The patient presents with difficulty breathing.  Underlying history of CHF and atrial fibrillation, has been compliant with medications recently including a recent temporary increase in Lasix dose.  Previous history of pleural effusion requiring left thoracotomy and extended chest tube drainage, hypertension, obesity, tobacco use, beer drinking.  No longer smokes or drinks.  No recent hospitalization.  Reports worsening symptoms for the last 3 days including dyspnea on exertion at short distance, increased bilateral LE and scrotal swelling, orthopnea, paroxysmal nocturnal dyspnea, and a sense of swelling in his abdomen and even in his neck.  No fever, chills, or sweats.  Has never tested positive for coronavirus and has not yet received the vaccine.  No other complaints..        Review of Systems   Constitutional symptoms:  Negative except as documented in HPI, no fever, no chills, no weakness.    Skin symptoms:  Negative except as documented in HPI, no rash, no breakdown.    Eye symptoms:  Negative except as documented in HPI, no recent vision problems, no pain.    ENMT symptoms:  Negative except as documented in HPI, no ear pain, no sore throat.    Respiratory symptoms:  Shortness of breath,  orthopnea, no cough, no wheezing.    Cardiovascular symptoms:  Peripheral edema, no chest pain, no palpitations, no syncope.    Gastrointestinal symptoms:  Negative except as documented in HPI, Abdominal swelling, no abdominal pain, no nausea, no vomiting, no diarrhea.    Genitourinary symptoms:  Negative except as documented in HPI, no dysuria, no hematuria.    Musculoskeletal symptoms:  Negative except as documented in HPI, no Muscle pain, no Joint pain.    Neurologic symptoms:  Negative except as documented in HPI, no altered level of consciousness, no weakness.    Psychiatric symptoms:  Negative except as documented in HPI, no anxiety, no depression.    Endocrine symptoms:  Negative except as documented in HPI, no polyuria, no polydipsia.    Hematologic/Lymphatic symptoms:  Negative except as documented in HPI, bleeding tendency negative, bruising tendency negative.    Allergy/immunologic symptoms:  Negative except as documented in HPI, no recurrent infections, no impaired immunity.              Additional review of systems information: All other systems reviewed and otherwise negative, All systems reviewed as documented in chart.      Health Status   Allergies:    Allergic Reactions (Selected)  No Known Allergies,    Allergies (1) Active Reaction  No Known Allergies None Documented  .   Medications:  (Selected)   Inpatient Medications  Ordered  Lidocaine Viscous 2% mucous membrane solution: 20 mL, form: Soln, Oral, Pre Op, first dose 05/04/16 11:21:00 CDT, stop date 05/04/16 11:21:00 CDT  Lidocaine Viscous 2% mucous membrane solution: 40 mL, 2 % =, form: Soln, Oral, As Directed, first dose 12/05/18 8:09:00 CST, (2) 20 ml cups at bedside.  Lidocaine Viscous: 20 mL, form: Soln, Oral, Pre Op, first dose 05/04/16 11:27:00 CDT, stop date 05/04/16 11:27:00 CDT  meperidine 50 mg/mL injectable solution: 50 mg, form: Injection, IV, As Directed, first dose 12/05/18 8:09:00 CST, at bedside for procedure.  midazolam 5  mg/mL injectable solution: 6 mg, form: Soln, IV, As Directed, first dose 12/05/18 8:09:00 CST, at bedside for procedure  Prescriptions  Prescribed  Aspir-Low 81 mg oral delayed release tablet: 81 mg = 1 tab(s), Oral, Daily, # 30 tab(s), 6 Refill(s), Pharmacy: [facility], 185, cm, Height/Length Dosing, 03/30/21 13:07:00 CDT, 93.3, kg, Weight Dosing, 03/30/21 13:07:00 CDT  Atrovent HFA 17 mcg/inh inhalation aerosol: 2 puff(s), INH, QID, # 12.9 gm, 6 Refill(s), Pharmacy: [facility], 185, cm, Height/Length Dosing, 03/30/21 13:07:00 CDT, 93.3, kg, Weight Dosing, 03/30/21 13:07:00 CDT  Imdur 30 mg oral tablet, extended release: 30 mg = 1 tab(s), Oral, qAM, # 30 tab(s), 6 Refill(s), Pharmacy: [facility], 185, cm, Height/Length Dosing, 03/30/21 13:07:00 CDT, 93.3, kg, Weight Dosing, 03/30/21 13:07:00 CDT  Toprol-XL 25 mg oral tablet, extended release: 25 mg = 1 tab(s), Oral, Daily, # 30 tab(s), 2 Refill(s), Pharmacy: [facility], 184, cm, Height/Length Dosing, 09/13/21 10:26:00 CDT, 91.8, kg, Weight Dosing, 09/13/21 10:26:00 CDT  Xarelto 20mg Tablet: 20 mg, Oral, With Supper, # 30 tab(s), 6 Refill(s), Pharmacy: [facility], 185, cm, Height/Length Dosing, 03/30/21 13:07:00 CDT, 93.3, kg, Weight Dosing, 03/30/21 13:07:00 CDT  furosemide 20 mg oral tablet: See Instructions, TAKE 1 TABLET BY MOUTH TWICE DAILY, # 60 tab(s), 6 Refill(s), Pharmacy: [facility], 185, cm, Height/Length Dosing, 03/30/21 13:07:00 CDT, 93.3, kg, Weight Dosing, 03/30/21 13:07:00 CDT  lisinopril 5 mg oral tablet: 5 mg = 1 tab(s), Oral, Daily, # 30 tab(s), 6 Refill(s), Pharmacy: [facility], 185, cm, Height/Length Dosing, 03/30/21 13:07:00 CDT, 93.3, kg, Weight Dosing, 03/30/21 13:07:00 CDT  potassium chloride 20 mEq oral TABLET extended release: 20 mEq = 1 tab(s), Oral, BID, # 10 tab(s), 0 Refill(s), Pharmacy:  [facility], 183, cm, Height/Length Dosing, 10/07/21 12:47:00 CDT, 97.2, kg, Weight Dosing, 10/07/21 12:47:00 CDT  simvastatin 40 mg oral tablet: 40 mg = 1 tab(s), Oral, Once a day (at bedtime), # 30 tab(s), 6 Refill(s), Pharmacy: [facility], 185, cm, Height/Length Dosing, 21 13:07:00 CDT, 93.3, kg, Weight Dosing, 21 13:07:00 CDT  spironolactone 25 mg oral tablet: 25 mg = 1 tab(s), Oral, Daily, # 30 tab(s), 6 Refill(s), Pharmacy: [facility], 185, cm, Height/Length Dosing, 21 13:07:00 CDT, 93.3, kg, Weight Dosing, 21 13:07:00 CDT.      Past Medical/ Family/ Social History   Medical history:    Active  ATRIAL FIBRILLATION ([id number])  CHF (congestive heart failure) (428.0)  Dyslipidemia (272.4)  HYPERTENSION (997.91)  Resolved  Cardiac ejection fraction 30% ([id number]):  Resolved on 2019 at 63 years.  Comments:  2019 CDT 2:42 CDT - Lenore Hughes MD  EF 30% on ECHO as of 2109.   Surgical history:    Transesophageal Echo (for Surgery) (None) on 2016 at 60 Years.  Comments:  2016 12:08 CDT - Willard BRAGG, Guadalupe SIMON  auto-populated from documented surgical case  cardiac stent..   Family history:    Stroke  Father (HALIE FLORES , )  Hypertension.  Mother ()  .   Social history:    Social & Psychosocial Habits    Alcohol  12/10/2020  Use: Current    Type: Beer    Frequency: Daily    Has alcohol use interfered with work or home life? No    Has anyone been hurt or at risk by your drinking? No    Concerns about alcohol use in household: No    11/15/2021  Use: Current    Frequency: 3-5 times per week    Employment/School  2021  Status: Disabled    Exercise  2021  Exercise type: Walking    Home/Environment  2021  Lives with: Alone    Living situation: Homeless/Shelter    Comment[facility] - 2021 12:56 - Katty Jackson  [place]    Nutrition/Health  09/13/2021  Home Diet Regular    Sexual  05/02/2019  What is your current gender identity? (Check all that apply) Identifies as male    Substance Use  07/08/2014 Risk Assessment: Denies Substance Abuse    09/13/2021  Type: Marijuana    Tobacco  05/10/2021  Use: 4 or less cigarettes(less    Patient Wants Consult For Cessation Counseling N/A    11/15/2021  Use: 4 or less cigarettes(less    Patient Wants Consult For Cessation Counseling No    Abuse/Neglect  03/30/2021  SHX Any signs of abuse or neglect No    Feels unsafe at home: No    Safe place to go: Yes    10/07/2021  SHX Any signs of abuse or neglect No    11/15/2021  SHX Any signs of abuse or neglect No    Spiritual/Cultural  01/09/2020  Jainism Preference Sikhism    Spiritual Services to Visit? No    Financial/Legal Situation  03/30/2021  Sources of Income Social Security Disabilit      03/30/2021  [employer]  .   Problem list:    Active Problems (10)  ATRIAL FIBRILLATION   CHF (congestive heart failure)   Dyslipidemia   HYPERTENSION   Knowledge deficit   Low back pain radiating to right lower extremity   Malnutrition   Obesity   Skin lesion of right ear   Tobacco user   .      Physical Examination               Vital Signs   Vital Signs   11/15/2021 12:55 CST     Temperature Temporal Artery               36.1 DegC  LOW                             Peripheral Pulse Rate     81 bpm                             Respiratory Rate          22 br/min                             SpO2                      98 %                             Oxygen Therapy            Room air                             Systolic Blood Pressure   110 mmHg                             Diastolic Blood Pressure  75 mmHg  .      Vital Signs (last 24 hrs)_____  Last Charted___________  Heart Rate Peripheral   81 bpm  (NOV 15 12:55)  Resp Rate         22 br/min  (NOV 15 12:55)  SBP      110 mmHg  (NOV 15 12:55)  DBP      75 mmHg  (NOV 15  12:55)  SpO2      98 %  (NOV 15 12:55)  Weight      98 kg  (NOV 15 12:55)  Height      182 cm  (NOV 15 12:55)  BMI      29.59  (NOV 15 12:55)  .   Measurements   11/15/2021 12:55 CST     Weight Dosing             98 kg                             Weight Measured           98 kg                             Weight Measured and Calculated in Lbs     216.05 lb                             Weight Estimated          98 kg                             Height/Length Dosing      182 cm                             Height/Length Measured    182 cm                             Height/Length Estimated   182 cm                             Body Mass Index Estimated 29.59 kg/m2                             Body Mass Index Measured  29.59 kg/m2  .   Basic Oxygen Information   11/15/2021 12:55 CST     SpO2                      98 %                             Oxygen Therapy            Room air  .   General:  Alert, mild distress, Obese, mild respiratory distress, worsening dyspnea with leaning back even 30 or 40°..    Skin:  Warm, dry, normal for ethnicity.    Head:  Normocephalic, atraumatic.    Neck:  Supple, trachea midline, no tenderness.    Eye:  Pupils are equal, round and reactive to light, extraocular movements are intact, normal conjunctiva.    Ears, nose, mouth and throat:  Oral mucosa moist, no pharyngeal erythema or exudate.    Cardiovascular:  No murmur, Normal peripheral perfusion, Normal rate, irregular rhythm, significant jugular venous distention, edema, abdominal distention suggestive of ascites but no anasarca to low back., No Regular rate and rhythm,    Respiratory:  Lungs are clear to auscultation, respirations are non-labored, breath sounds are equal, Symmetrical chest wall expansion.    Chest wall:  No tenderness, No deformity.    Back:  Nontender, Normal range of motion, Normal alignment.    Musculoskeletal:  Normal ROM, normal strength, no tenderness, no swelling, no deformity.    Gastrointestinal:  Soft,  Nontender, Normal bowel sounds, Obese, moderate distension suspicious for ascites.    Neurological:  Alert and oriented to person, place, time, and situation, No focal neurological deficit observed, CN II-XII intact, normal motor observed, normal speech observed.    Lymphatics:  No lymphadenopathy.   Psychiatric:  Cooperative, appropriate mood & affect, normal judgment.       Medical Decision Making   Documents reviewed:  Emergency department nurses' notes, emergency department records, prior records.    Orders  Launch Orders   Laboratory:  COVID/FLU A&B PCR (Order): Stat collect, Nasopharyngeal Swab, 11/15/2021 13:18 CST, Nurse collect  COVID-19 PCR - FOR TRAVEL (Order): Stat collect, Nasal, 11/15/2021 13:18 CST, Nurse collect  Troponin-I (Order): Stat collect, 11/15/2021 13:17 CST, Blood, Lab Collect, 11/15/2021 13:17 CST  CPK (Order): Stat collect, 11/15/2021 13:17 CST, Blood, Lab Collect, 11/15/2021 13:17 CST  CMP (Order): Stat collect, 11/15/2021 13:17 CST, Blood, Lab Collect, 11/15/2021 13:17 CST  CK MB (Order): Stat collect, 11/15/2021 13:17 CST, Blood, Lab Collect, 11/15/2021 13:17 CST  CBC w/ Auto Diff (Order): Stat collect, 11/15/2021 13:17 CST, Blood, Once, Stop date 11/15/2021 13:17 CST, Lab Collect, 11/15/2021 13:17 CST  BNP (Order): Stat collect, 11/15/2021 13:17 CST, Blood, Lab Collect, 11/15/2021 13:17 CST  Patient Care:  Accurate urine output (Order): 11/15/2021 13:18 CST, Accurate urine output  Saline Lock Insert (Order): 11/15/2021 13:17 CST  Pulse Oximetry (Order): 11/15/2021 13:17 CST  Cardiac Monitoring (Order): 11/15/2021 13:17 CST, Constant Order  Pharmacy:  Lasix (Order): 60 mg, form: Injection, IV Push, Once, first dose 11/15/2021 13:17 CST, stop date 11/15/2021 13:17 CST, STAT  Radiology:  XR Chest 2 Views (Order): Stat, 11/15/2021 13:17 CST, Chest Pain, None, Wheelchair, Patient Has IV?, Rad Type, Not Scheduled  Cardiology:  EKG (Order): 11/15/2021 13:17 CST, Stat, Wheelchair, Patient Has  IV, Standard Precautions, NOW, -1, -1, 11/15/2021 13:17 CST  Respiratory Therapy:  Oxygen Therapy (Order): 11/15/2021 13:17 CST, 3, Nasal Cannula, CM Oxygen, Launch Orders   Referral:  [facility] Internal Referral to Internal Medicine Clinic (Order): Specialty: Internal Medicine, Start: 11/15/2021 14:50 CST.    Cardiac monitor:  Time 11/15/2021 13:19:00, Rate 90, Atrial fibrillation.    Electrocardiogram:  Time 11/15/2021 13:01:00, rate 84, EP Interp, Atrial fibrillation at 84 bpm, lateral T-wave inversions possibly ischemic, prolonged QT, slightly noisy baseline.  Ventricular response rate well controlled..    Results review:  Lab results : Lab View   11/15/2021 13:59 CST     Est Creat Clearance Ser   78.71 mL/min    11/15/2021 13:44 CST     Influ A PCR               Negative                             Influ B PCR               Negative                             SARS-CoV-2 PCR            Not Detected    11/15/2021 13:25 CST     Sodium Lvl                141 mmol/L                             Potassium Lvl             3.2 mmol/L  LOW                             Chloride                  104 mmol/L                             CO2                       28 mmol/L                             Calcium Lvl               9.3 mg/dL                             Glucose Lvl               93 mg/dL                             BUN                       13.6 mg/dL                             Creatinine                1.03 mg/dL                             eGFR-AA                   94                             eGFR-EDITH                  77 mL/min/1.73 m2  LOW                             Bili Total                3.1 mg/dL  HI                             Bili Direct               1.2 mg/dL  HI                             Bili Indirect             1.90 mg/dL  HI                             AST                       24 unit/L                             ALT                       11 unit/L                             Alk Phos                   103 unit/L                             Total Protein             7.0 gm/dL                             Albumin Lvl               3.7 gm/dL                             Globulin                  3.3 gm/dL                             A/G Ratio                 1.1 ratio                             BNP                       1,153.0 pg/mL  HI                             Total CK                  71 U/L                             CK MB                     1.2 ng/mL                             Troponin-I                0.023 ng/mL                             WBC                       5.3 x10(3)/mcL                             RBC                       3.66 x10(6)/mcL  LOW                             Hgb                       11.1 gm/dL  LOW                             Hct                       35.8 %  LOW                             Platelet                  186 x10(3)/mcL                             MCV                       97.8 fL                             MCH                       30.3 pg                             MCHC                      31.0 gm/dL                             RDW                       16.3 %  HI                             MPV                       10.9 fL  HI                             Abs Neut                  3.14 x10(3)/mcL                             Neutro Auto               59 %  NA                             Lymph Auto                29 %  NA                             Mono Auto                 11 %  NA                             Eos Auto                  1 %  NA                             Abs Eos                   0.0 x10(3)/mcL                             Basophil Auto             0 %  NA                             Abs Neutro                3.14 x10(3)/mcL                             Abs Lymph                 1.5 x10(3)/mcL                             Abs Mono                  0.6 x10(3)/mcL                             Abs Baso                  0.0 x10(3)/mcL                              NRBC%                     0.0 %                             IG%                       0 %  NA                             IG#                       0.020  NA  .   Radiology results:  Rad Results (ST)  < 12 hrs   Accession: [id number]  Order: XR Chest 2 Views  Report Dt/Tm: 11/15/2021 14:06  Report:   CHEST X-RAY, PA AND LATERAL VIEWS     HISTORY: Chest Pain     COMPARISON:   10/7/2021.     FINDINGS:     Blunting of the posterior and lateral costophrenic sulci bilaterally.  No lobar consolidations or pneumothoraces.  Enlarged cardiac silhouette with central vascular prominence.  No acute bony pathology.  Soft tissues within normal limits.     IMPRESSION:     Central vascular prominence.  Trace bilateral pleural effusions versus pleural thickening.    .      Reexamination/ Reevaluation   Time: 11/15/2021 14:48:00 .   Pain status: unchanged.   Assessment: exam unchanged.   Notes: Discussed with Dr. Moscoso - Int. Med. to see and admit..      Impression and Plan   Diagnosis   Acute exacerbation of CHF (congestive heart failure) (IYZ55-RU I50.9)   Volume overload (DYV43-ES E87.70)   Hypokalemia (UKB86-JV E87.6)   Plan   Disposition: Admit time  11/15/2021 14:49:00, Place in Observation Telemetry Unit, Dr. Moscoso - Int. Med. .

## 2022-04-30 NOTE — CONSULTS
Patient:   Ran Reyes Jr             MRN: [id number]            FIN: [id number]               Age:   66 years     Sex:  Male     :  1955   Associated Diagnoses:   None   Author:   Ruperto Acosta MD      OPHTHALMOLOGY CONSULT NOTE        CC: Blurry Vision OS     HPI: 59M admitted for CHF exacerbation/ C/o blurry vision OS. Onset x3 days ago, was not doing heavy exertion or straining at onset. Localizes blurriness to nasal hemifield OS.   -Denies photopsia, floaters, curtains  -Denies jaw claudication, temporal HA, scalp TTP or proximal girdle pain/weakness  -Denies HA or TVO  -Denies hx IVDU    PMH: Afib, HFrEF, COPD, HTN, Mitral Regurgitation  POH: denies prior trauma or surgery. Wears reading glasses  Meds: see EMR  Allergies: NKDA  Soc: smokes 1 pack per 2 weeks. Denies alcohol or drugs  Fam: denies ocular disease     ROS: 10-point ROS performed and negative except where specified. Please see HPI for pertinent ophthalmic history.     PHYSICAL EXAM  GEN  VA: 20/30 (-1) OD, 20/200 PH 20/100 OS  IOP:   Pup: ERRLA 4 to 3 mm OU, no RAPD  CF: Full ou  EOM: full, ortho ou     SLE  L/L: wnl ou  C/S: c&s w CAM ou  K: clear ou  AC: d&q ou  Iris: f/r/r ou  Lens: 2+ CC ou, concentrated at 4 o'clock     DFE  Disc: p&s ou, CDR 0.3 // 0.7   Mac: flat ou  Vasc: mod attenuation ou  Per: no h/h/t ou, PVD OS     ASSESSMENT / PLAN:  1. Blurry Vision OS: PVD vs CC  -No evidence of optic nerve or retinal pathology  -Sx not c/w vascular or inflammatory etiology  -Can F/U in general ophthalmology clinic after DC x1 week  -RD precautions given , pt to inform tx team if he experiences these    2. Glaucoma Suspect  -CDR asymmetry and borderline IOP elevation  -Ophthalmology clinic after DC for OCT RNFL, HVF        Dave PGY-2  Att: Jigar

## 2022-04-30 NOTE — ED PROVIDER NOTES
Patient:   Ran Reyes Jr             MRN: [id number]            FIN: [id number]               Age:   63 years     Sex:  Male     :  1955   Associated Diagnoses:   Chronic CHF; Syncope   Author:   Kev GARCIA, Ines      Basic Information   Time seen: Date 2019, Immediately upon arrival.   History source: Patient.   Arrival mode: Private vehicle.   History limitation: None.   Additional information: Chief Complaint from Nursing Triage Note : Chief Complaint   2019 10:52 CST       Chief Complaint           Pt reports had syncopal episode today, awaiting for a pacemaker placement, fell, hit head, +LOC, +thinners at home. On coumadin. C/o headache. EKG done in triage. Dr. Angulo to assess patient in triage.  .      History of Present Illness   The patient presents with syncope.  The onset was just prior to arrival.  The course/duration of symptoms is resolved: lasted 1 minute(s).  The location where the incident occurred was at home.  The exacerbating factor is none.  The relieving factor is none.  Risk factors consist of coronary artery disease, hypertension, smoking and Chronic Atrial Fib, CHF .  Prior episodes: none.  Therapy today: see nurses notes.  Preceding symptoms: none.  Associated symptoms: headache, denies chest pain, denies abdominal pain, denies nausea, denies vomiting, denies fever, denies chills, denies shortness of breath and denies dizziness.  Associated injury to the abrasion location: posterior head and posterior elbow.        Review of Systems   Constitutional symptoms:  No fever, no chills, no sweats, no weakness, no fatigue, no decreased activity.    Respiratory symptoms:  No shortness of breath, no cough.    Cardiovascular symptoms:  No chest pain, no palpitations, no tachycardia, no syncope.    Gastrointestinal symptoms:  No abdominal pain, no nausea.    Musculoskeletal symptoms:  No back pain, no Muscle pain, no Joint pain.    Neurologic symptoms:  Headache, no dizziness,  no altered level of consciousness.    Psychiatric symptoms:  No anxiety, no depression, no substance abuse.              Additional review of systems information: All systems reviewed as documented in chart.      Health Status   Allergies:    Allergic Reactions (Selected)  No Known Allergies,    Allergies (1) Active Reaction  No Known Allergies None Documented.   Medications:  (Selected)   Inpatient Medications  Ordered  Lidocaine Viscous 2% mucous membrane solution: 20 mL, form: Soln, Oral, Pre Op, first dose 05/04/16 11:21:00 CDT, stop date 05/04/16 11:21:00 CDT  Lidocaine Viscous 2% mucous membrane solution: 40 mL, 2 % =, form: Soln, Oral, As Directed, first dose 12/05/18 8:09:00 CST, (2) 20 ml cups at bedside.  Lidocaine Viscous: 20 mL, form: Soln, Oral, Pre Op, first dose 05/04/16 11:27:00 CDT, stop date 05/04/16 11:27:00 CDT  meperidine 50 mg/mL injectable solution: 50 mg, form: Injection, IV, As Directed, first dose 12/05/18 8:09:00 CST, at bedside for procedure.  midazolam 5 mg/mL injectable solution: 6 mg, form: Soln, IV, As Directed, first dose 12/05/18 8:09:00 CST, at bedside for procedure  Prescriptions  Prescribed  Atrovent HFA 17 mcg/inh inhalation aerosol: 2 puff(s), INH, QID, # 12.9 gm, 1 Refill(s), Pharmacy: [facility]  Coreg 12.5 mg oral tablet: 12.5 mg = 1 tab(s), Oral, BID, # 60 tab(s), 6 Refill(s), Pharmacy: [facility]  Lasix 20 mg oral tablet: 20 mg = 1 tab(s), Oral, Daily, # 30 tab(s), 3 Refill(s), Pharmacy: [facility]  aspirin 81 mg oral tablet: 81 mg = 1 tab(s), Oral, Daily, # 30 tab(s), 2 Refill(s)  isosorbide MONOnitrate 30 mg oral tablet, Extended Release: 30 mg = 1 tab(s), Oral, qAM, # 90 tab(s), 3 Refill(s), Pharmacy: [facility]  lisinopril 10 mg oral tablet: 10 mg = 1 tab(s), Oral, Daily, # 90 tab(s), 3 Refill(s), Pharmacy: [facility]  simvastatin 40 mg oral tablet: 40 mg = 1 tab(s), Oral, Once a day (at bedtime), # 30 tab(s), 6  Refill(s), Pharmacy: [facility]  spironolactone 25 mg oral tablet: 25 mg = 1 tab(s), Oral, Daily, # 90 tab(s), 3 Refill(s), Pharmacy: [facility]  warfarin 10 mg oral tablet: See Instructions, TAKE 1 TABLET BY MOUTH AT BEDTIME, # 30 tab(s), 1 Refill(s), eRx: [facility]  Documented Medications  Documented  MUPIROCIN 2% OINTMENT: See Instructions, 1 yanira TOP  TRIAMCINOLONE 0.025% CREAM: 1 yanira, TOP, BID.      Past Medical/ Family/ Social History   Medical history:    Active  ATRIAL FIBRILLATION ([id number])  CHF (congestive heart failure) (428.0)  Dyslipidemia (272.4)  HYPERTENSION (997.91), Reviewed as documented in chart.   Surgical history:    Transesophageal Echo (for Surgery) (None) on 2016 at 60 Years.  Comments:  2016 12:08 - Willard BRAGG, Guadalupe SIMON  auto-populated from documented surgical case  cardiac stent., Reviewed as documented in chart.   Family history:    Stroke  Father (HALIE FLORES , )  Hypertension.  Mother  , Reviewed as documented in chart.   Social history:    Social & Psychosocial Habits    Alcohol  12/10/2015  Use: Current    Type: Beer    Frequency: 1-2 times per week    2018  Use: Current    Type: Beer    Frequency: 1-2 times per week    Employment/School  12/10/2015  Status: Employed    Highest education: Some college    2018  Status: Unemployed    Exercise  12/10/2015  Self assessment: Fair condition    Home/Environment  12/10/2015  Lives with: Significant other    Living situation: Home/Independent    Home equipment: BLOOD PRESSURE MONITOR    Alcohol abuse in household: No    Substance abuse in household: No    Smoker in household: Yes    Injuries/Abuse/Neglect in household: No    Feels unsafe at home: No    Safe place to go: Yes    Family/Friends available to help: Yes    Nutrition/Health  12/10/2015  Diet description: NO PORK, COUMADIN    Substance Abuse  2014 Risk Assessment: Denies Substance Abuse    12/10/2015  Use:  Never    07/25/2018  Use: Never    Tobacco  07/20/2014  Type: Cigarettes    Comment: 1/2 ppd - 07/20/2014 12:05 - Simona West RN    11/25/2015  Use: Light tobacco smoker    12/10/2015  Use: Current some day smoker    Type: Cigarettes    Comment: SMOKE 6-7/WEEK - 12/10/2015 13:31 - Aneta Zheng LPN    02/24/2016  Use: Light tobacco smoker    Type: Cigarettes    Comment: smokes 1-2 cigg a day - 02/24/2016 11:48 - Leila BRAGG, Missy FAYE    05/02/2018  Use: Current some day smoker    Type: Cigarettes    Tobacco use per day: 3    07/11/2018  Use: Current every day smoker    Type: Cigarettes    Tobacco use per day: 3    07/25/2018  Use: Current some day smoker    Type: Cigarettes    09/20/2018  Use: Current every day smoker    Type: Cigarettes    Tobacco use per day: 3    Number of years: 30    11/07/2018  Use: Current every day smoker    Type: Cigarettes    Patient Wants Consult For Cessation Counseling No    11/12/2018  Use: Current every day smoker    Type: Cigarettes    Patient Wants Consult For Cessation Counseling Yes    Tobacco use per day: 2    11/29/2018  Use: Current every day smoker    Patient Wants Consult For Cessation Counseling Yes    Ready to change: Yes    02/06/2019  Use: 10 or more cigarettes (1/    Patient Wants Consult For Cessation Counseling N/A, Reviewed as documented in chart.   Problem list:    Active Problems (6)  ATRIAL FIBRILLATION   CHF (congestive heart failure)   Dyslipidemia   HYPERTENSION   Skin lesion of right ear   Tobacco user .      Physical Examination               Vital Signs   Vital Signs   2/6/2019 10:52 CST       Temperature Oral          36.7 DegC                             Temperature Oral (calculated)             98.06 DegF                             Peripheral Pulse Rate     81 bpm                             Respiratory Rate          18 br/min                             SpO2                      100 %                             Oxygen Therapy            Room  air                             Systolic Blood Pressure   125 mmHg                             Diastolic Blood Pressure  66 mmHg  .      Vital Signs (last 24 hrs)_____  Last Charted___________  Temp Oral     36.7 DegC  (FEB 06 10:52)  Heart Rate Peripheral   81 bpm  (FEB 06 10:52)  Resp Rate         18 br/min  (FEB 06 10:52)  SBP      125 mmHg  (FEB 06 10:52)  DBP      66 mmHg  (FEB 06 10:52)  SpO2      100 %  (FEB 06 10:52)  Weight      85 kg  (FEB 06 10:52).   General:  Alert, no acute distress.    [place] coma scale:  Eye response: 4 /4, verbal response: 5 /5, motor response: 6 /6, Total score: Total score: 15.    Neurological:  Alert and oriented to person, place, time, and situation, No focal neurological deficit observed, CN II-XII intact, normal sensory observed, normal motor observed, normal speech observed, normal coordination observed.    Skin:  Normal for ethnicity,   right posterior elbow- abrasion  occipital lobe- abrasion  .    Head:  Normocephalic, atraumatic.    Neck:  Supple.   Eye:  Normal conjunctiva.   Ears, nose, mouth and throat:  Oral mucosa moist.   Cardiovascular:  Regular rate and rhythm, Normal peripheral perfusion.    Respiratory:  Lungs are clear to auscultation, respirations are non-labored, breath sounds are equal.    Gastrointestinal:  Soft, Nontender, Non distended, Normal bowel sounds.    Psychiatric:  Cooperative.      Medical Decision Making   Rationale:      Shane Tristan Syncope Rule   Hx f CHF: yes  Hct<30%: no  abnormal EKG: yes  Shortness of breath:  no  SBP<90: no    Total: 2      Reviewed documentation  04/2018- ECHO: EF 18%  Cardiology Clinic visit- 05/2018-  Plan is to schedule for ICD placement.    Cardiology Clinic visit- 11/2018-  Plan is to schedule for ICD placement.  .   Documents reviewed:  Emergency department nurses' notes, emergency department records, prior records.    Electrocardiogram:  Time 2/6/2019 10:56:00, rate 86, normal NE & QRS intervals, EP Interp, The  Rhythm is atrial fibrillation.  , T wave Inversion, I, , AVL, , V5, , V6, Previous EKG available No changes, compared with 11/7/2018 10:12:00.    Results review:  Lab results : Lab View   2/6/2019 13:07 CST       UA Appear                 Hazy                             UA Color                  YELLOW                             UA Spec Grav              1.012                             UA Bili                   Negative                             UA pH                     6.0                             UA Urobilinogen           Normal                             UA Blood                  0.03 mg/dL                             UA Glucose                Normal                             UA Ketones                Negative                             UA Protein                20 mg/dL                             UA Nitrite                2+                             UA Leuk Est               500 Wong/uL                             UA WBC Interp             >=100 /HPF                             UA RBC Interp             0-2                             UA Bact Interp            Many                             UA Squam Epi Interp                                    UA Hyal Cast Interp       11-25    2/6/2019 11:20 CST       NT pro BNP.               592 pg/mL  HI                             PT                        13.9 second(s)                             INR                       1.08                             PTT                       28.1 second(s)    2/6/2019 11:05 CST       Sodium Lvl                137 mmol/L                             Potassium Lvl             4.1 mmol/L                             Chloride                  102 mmol/L                             CO2                       27 mmol/L                             Calcium Lvl               8.9 mg/dL                             Magnesium Lvl             1.9 mg/dL                             Glucose Lvl               80 mg/dL                              BUN                       13 mg/dL                             Creatinine                0.80 mg/dL                             eGFR-AA                   >105 mL/min                             eGFR-EDITH                  104 mL/min                             Bili Total                0.7 mg/dL                             Bili Direct               0.3 mg/dL                             Bili Indirect             0.4 mg/dL                             AST                       43 unit/L  HI                             ALT                       33 unit/L                             Alk Phos                  105 unit/L                             Total Protein             7.9 gm/dL                             Albumin Lvl               3.6 gm/dL                             Globulin                  4.30 gm/mL  HI                             A/G Ratio                 0.8 ratio  LOW                             Total CK                  59 unit/L                             CK MB                     <1.0 ng/mL                             Troponin-I                <0.015 ng/mL                             WBC                       6.6 x10(3)/mcL                             RBC                       4.05 x10(6)/mcL  LOW                             Hgb                       13.0 gm/dL  LOW                             Hct                       38.9 %  LOW                             Platelet                  194 x10(3)/mcL                             MCV                       96.0 fL                             MCH                       32.1 pg                             MCHC                      33.4 gm/dL                             RDW                       15.5 %  HI                             MPV                       10.3 fL                             Abs Neut                  4.10 x10(3)/mcL                             Neutro Auto               62 x10(3)/mcL  NA                             Lymph Auto                 25 %                             Mono Auto                 11 %                             Eos Auto                  2  NA                             Abs Eos                   0.10 x10(3)/mcL  NA                             Basophil Auto             0  NA                             Abs Neutro                4.10 x10(3)/mcL  NA                             Abs Lymph                 1.66 x10(3)/mcL  NA                             Abs Mono                  0.71 x10(3)/mcL  NA                             Abs Baso                  0.03 x10(3)/mcL  NA                             IG%                       0 %  NA                             IG#                       0.0200  NA  .   Radiology results:  Rad Results (ST)  < 12 hrs   Accession: [id number]  Order: CT Head W/O Contrast  Report Dt/Tm: 02/06/2019 11:50  Report:   Clinical History:  Fall     Technique:  Axial CT of the brain were obtained without contrast. There are  osseous reconstructed images available for review with coronal and  sagittal reconstructions.     Automatic exposure control was utilized to reduce the patient's  radiation dose.     Comparison:  No prior imaging available for comparison.     Findings:  No acute intracranial hemorrhage.  Diffuse cerebral atrophy with concordant ventricular enlargement.   There is normal gray white differentiation.   The osseous structures are normal.   The mastoid air cells are clear.   The auditory canals are patent bilaterally.  The globes and orbital contents are normal bilaterally.  Fluid within the right maxillary sinus.     Impression  No acute intracranial hemorrhage. Findings of chronic microvascular  ischemic disease.  Flow within the right maxillary sinus.      Accession: [id number]  Order: XR Chest 2 Views  Report Dt/Tm: 02/06/2019 11:31  Report:      CLINICAL:  Chest pain.     COMPARISON: November 7, 2018.     FINDINGS:  Cardiopericardial silhouette is within normal limits.  Hyperinflated  lung are without dense focal or segmental consolidation,  congestion, pleural effusion or pneumothorax.       IMPRESSION:     NO ACUTE CARDIOPULMONARY PROCESS IDENTIFIED.     .      Reexamination/ Reevaluation   Time: 2/6/2019 13:00:00 .   Vital signs   results included from flowsheet : Vital Signs   2/6/2019 12:09 CST       Peripheral Pulse Rate     113 bpm  HI                             Respiratory Rate          17 br/min                             SpO2                      99 %                             Oxygen Therapy            Room air                             Systolic Blood Pressure   125 mmHg                             Diastolic Blood Pressure  85 mmHg    2/6/2019 10:52 CST       Temperature Oral          36.7 DegC                             Temperature Oral (calculated)             98.06 DegF                             Peripheral Pulse Rate     81 bpm                             Respiratory Rate          18 br/min                             SpO2                      100 %                             Oxygen Therapy            Room air                             Systolic Blood Pressure   125 mmHg                             Diastolic Blood Pressure  66 mmHg     Course: improving.   Pain status: decreased.   Assessment: exam unchanged, Pt with syncope event-consult medicine for admission.      Procedure   Critical care note   Total time: 30 minutes spent engaged in work directly related to patient care and/ or available for direct patient care.   Critical condition(s) addressed for impending deterioration include: cardiovascular.   Associated risk factors: bleeding, dysrhythmia, metabolic changes, dehydration, hypertension.   Management: bedside assessment, supervision of care, Interpretation (chest x-ray, electrocardiogram, blood pressure, cardiac output measures), Interventions hemodynamic management.   Performed by: self.      Impression and Plan   Diagnosis   Chronic CHF (PAG41-DE I50.9)    Syncope (WND22-CP R55)      Calls-Consults   -  2/6/2019 13:16:00 , Dino GARCIA, Merle ALFARO, Internal Medicine , recommends admission.    Plan   Condition: Stable.    Disposition: Place in Observation Telemetry Unit.    Counseled: Patient, Regarding diagnosis, Regarding diagnostic results, Regarding treatment plan, Patient indicated understanding of instructions.

## 2022-05-02 NOTE — HISTORICAL [facility] CERNER
This is a historical note converted from Robb. Formatting and pictures may have been removed.  Please reference Robb for original formatting and attached multimedia. Chief Complaint  Pt reports had syncopal episode today, awaiting for a pacemaker placement, fell, hit head, +LOC, +thinners at home. On coumadin. C/o headache. EKG done in triage. Dr. Angulo to assess patient in triage.  History of Present Illness  PMHx: CHFrEF (LVEF 17% - Nov 2018); atrial fibrillation?on Coumadin, HTN, HLD, tobacco use.  ?   This is a 63 y.o AAM who presents with a syncopal episode. ?Patient was in his usual state of health?when this morning?around 9:45 AM he got out of his truck?and experienced a syncopal episode that lasted about 2-3 minutes.?? He states the he fell to his right side and hit his head. Patient denies any prodrome inclusive of visual changes, dizziness, palpitations,?feeling flushed,?nausea. Shun weakness, numbness or loss of motor strength. No speech or facial changes. ?He denies any chest pain, shortness of breath.??Reports adequate fluid intake and?denies urinary frequency (states hes on a fluid pill and urinates about 5-6x during the day- denies any high-volume diuresis). ?Had not been engaged in any strenuous activity leading up to the syncopal episode.? Denies any confusion,?seizure activity?or history of falls. He does endorse a R sided frontal headache; 4/10, non radiating and states his right eye feels blurry.  He denies any previous episodes of feeling lightheaded or dizzy from a positional change. Does report that he has had syncopal episodes like this in the past (last one was 3 years ago).  Endorses compliance with all his medications but does state that he ran out of his coumadin and has not been in Coumadin clinic recently. Denies bruising but does admit to seeing specks of blood in his stool; denies straining/ hx of constipation. ?Denies large volume of blood in stool. Has been going on for a few  months. No weight loss, fever, chills, night sweats. No abdominal discomfort.  ?   Currently homeless; lives in and out of shelters.  Social Hx: current smoker; 40pk/yrs;?3-4 beers a week; +marijuana; no IVDU  Surgeries: none  FHx: both parents with HTN; father with MI; mother with CHF  ALL: none  PCP: none  Review of Systems  Except as documented, all other systems reviewed and are negative.  Physical Exam  Vitals & Measurements  T:?36.7? ?C (Oral)? HR:?82(Peripheral)? RR:?17? BP:?131/97? SpO2:?99%? WT:?85?kg?  Gen: NAD  HEENT: NCAT, EOMI, moist oral mucosa,?no neck masses, no LAD  Resp: CTAB. Normal inspiratory effort. No cough. No hemoptysis, no crackles; +Right sided wheezing  CVS: S1, S2, regular. no murmur. 2+ pedal pulses, +trace pedal edema  Abd: NT, ND, soft, no masses. BS2+ all quadrants  MSK: NROM, no deformity  Skin: no rash, no skin changes  Neurologic: Alert and oriented x3, nad,?  ???????????????? Sensory:?pain and temp sensation intact bilaterally  ?????????????????Motor:? motor power 5/5, tone 5/5  ?????????????????Cranial nerves II-XII within normal limits, normal DTRs, gait not assessed  ?  ?   Labs Last 24 Hours?  ?Chemistry? Hematology/Coagulation?   Sodium Lvl: 137 mmol/L (02/06/19 11:05:00) PT: 13.9 second(s) (02/06/19 11:20:00)   Potassium Lvl: 4.1 mmol/L (02/06/19 11:05:00) INR: 1.08 (02/06/19 11:20:00)   Chloride: 102 mmol/L (02/06/19 11:05:00) PTT: 28.1 second(s) (02/06/19 11:20:00)   CO2: 27 mmol/L (02/06/19 11:05:00) WBC: 6.6 x10(3)/mcL (02/06/19 11:05:00)   Calcium Lvl: 8.9 mg/dL (02/06/19 11:05:00) RBC:?4.05 x10(6)/mcL?Low (02/06/19 11:05:00)   Magnesium Lvl: 1.9 mg/dL (02/06/19 11:05:00) Hgb:?13 gm/dL?Low (02/06/19 11:05:00)   Glucose Lvl: 80 mg/dL (02/06/19 11:05:00) Hct:?38.9 %?Low (02/06/19 11:05:00)   BUN: 13 mg/dL (02/06/19 11:05:00) Platelet: 194 x10(3)/mcL (02/06/19 11:05:00)   Creatinine: 0.8 mg/dL (02/06/19 11:05:00) MCV: 96 fL (02/06/19 11:05:00)   eGFR-AA: >105 (02/06/19  11:05:00) MCH: 32.1 pg (02/06/19 11:05:00)   eGFR-EDITH: 104 mL/min (02/06/19 11:05:00) MCHC: 33.4 gm/dL (02/06/19 11:05:00)   Bili Total: 0.7 mg/dL (02/06/19 11:05:00) RDW:?15.5 %?High (02/06/19 11:05:00)   Bili Direct: 0.3 mg/dL (02/06/19 11:05:00) MPV: 10.3 fL (02/06/19 11:05:00)   Bili Indirect: 0.4 mg/dL (02/06/19 11:05:00) Abs Neut: 4.1 x10(3)/mcL (02/06/19 11:05:00)   AST:?43 unit/L?High (02/06/19 11:05:00) Neutro Auto: 62 x10(3)/mcL (02/06/19 11:05:00)   ALT: 33 unit/L (02/06/19 11:05:00) Lymph Auto: 25 % (02/06/19 11:05:00)   Alk Phos: 105 unit/L (02/06/19 11:05:00) Mono Auto: 11 % (02/06/19 11:05:00)   Total Protein: 7.9 gm/dL (02/06/19 11:05:00) Eos Auto: 2 (02/06/19 11:05:00)   Albumin Lvl: 3.6 gm/dL (02/06/19 11:05:00) Abs Eos: 0.1 x10(3)/mcL (02/06/19 11:05:00)   Globulin:?4.3 gm/mL?High (02/06/19 11:05:00) Basophil Auto: 0 (02/06/19 11:05:00)   A/G Ratio:?0.8 ratio?Low (02/06/19 11:05:00) Abs Neutro: 4.1 x10(3)/mcL (02/06/19 11:05:00)   NT pro BNP.:?592 pg/mL?High (02/06/19 11:20:00) Abs Lymph: 1.66 x10(3)/mcL (02/06/19 11:05:00)   Total CK: 59 unit/L (02/06/19 11:05:00) Abs Mono: 0.71 x10(3)/mcL (02/06/19 11:05:00)   CK MB: <1.0 (02/06/19 11:05:00) Abs Baso: 0.03 x10(3)/mcL (02/06/19 11:05:00)   Troponin-I: <0.015 (02/06/19 11:05:00) IG%: 0 % (02/06/19 11:05:00)   ? IG#: 0.02 (02/06/19 11:05:00)   ?  ?  Assessment/Plan  Syncope, unclear etiology  Rule out TIA  Atrial fibrillation on Coumadin  Subtherapeutic INR  Normocytic anemia  Hx of CHFrEF - LVEF 18%  Hx of HLD, HTN  Tobacco use  ?  Plan:  - patient admitted with syncopal episode; likely cardiogenic in origin but cannot rule out neurogenic or orthostatic causes  - orthostatics ordered  - fall precautions  - Given patient has hx of afib on coumadin with a subtherapeutic INR: risk of clot resulting in TIA is high; will proceed with neuroimaging studies  - repeat echo, MRI brain, MRA head and neck ordered  - US carotid in nov 2018 wnl  - neurochecks  q4hr; PT ordered; bedside swallow eval  - FOBT ordered for hx of normocytic anemia and ocassional blood specked stools; H&H stable  - Discussed case with cardiology, recommend close monitoring of cardiac status and to assess for propensity of developing arrhythmias; AICD placement is unlikley to be done in-patient in the absence of symptomatic tachyarrhythmias.  ?   Meds:  - will hold blood pressure meds for today until we have imaging studies demonstrating absence of any developing infarcts and continue with Coreg 12.5mg BID for rate control  - Continue home dose of Coumadin (goal INR 2-3)  - SCDs for DVT ppx; will initiate lovenox afte review of imaging studies  - Currently normotensive; will hold off on antiHTN meds and allow for permissive HTN in the setting of possible stroke or neurological event.  - case d/w with staff.  ?  ?  Disposition: 63 y.o M admitted to tele with monitor for syncopal episode; management as above; will f/u on neuroimaging studies and proceed accordingly.  ?   Problem List/Past Medical History  Ongoing  ATRIAL FIBRILLATION  CHF (congestive heart failure)  Dyslipidemia  HYPERTENSION  Skin lesion of right ear  Tobacco user  Historical  No qualifying data  Procedure/Surgical History  Transesophageal Echo (for Surgery) (None) (05/04/2016)  cardiac stent   Medications  Inpatient  acetaminophen 500 mg oral tablet, 500 mg= 1 tab(s), Oral, q6hr, PRN  aspirin 81 mg oral tablet, CHEWABLE, 81 mg= 1 tab(s), Oral, Daily  Coreg 12.5 mg oral tablet, 12.5 mg= 1 tab(s), Oral, BID  isosorbide MONOnitrate 30 mg oral tablet, Extended Release, 30 mg= 1 tab(s), Oral, qAM  Lidocaine Viscous, 20 mL, Oral, Pre Op  Lidocaine Viscous 2% mucous membrane solution, 20 mL, Oral, Pre Op  Lidocaine Viscous 2% mucous membrane solution, 2 %= 40 mL, Oral, As Directed  meperidine 50 mg/mL injectable solution, 50 mg= 1 mL, IV, As Directed  midazolam 5 mg/mL injectable solution, 6 mg= 1.2 mL, IV, As Directed  simvastatin 40  mg oral tablet, 40 mg= 1 tab(s), Oral, Once a day (at bedtime)  warfarin 5 mg oral tablet, 10 mg= 2 tab(s), Oral, Daily  Home  aspirin 81 mg oral tablet, 81 mg= 1 tab(s), Oral, Daily, 2 refills  Atrovent HFA 17 mcg/inh inhalation aerosol, 2 puff(s), INH, QID, 1 refills  Coreg 12.5 mg oral tablet, 12.5 mg= 1 tab(s), Oral, BID, 6 refills  isosorbide MONOnitrate 30 mg oral tablet, Extended Release, 30 mg= 1 tab(s), Oral, qAM, 3 refills  Lasix 20 mg oral tablet, 20 mg= 1 tab(s), Oral, Daily, 3 refills  lisinopril 10 mg oral tablet, 10 mg= 1 tab(s), Oral, Daily, 3 refills  MUPIROCIN 2% OINTMENT,? ?Not taking: PT STATED Last Dose Date/Time Unknown  simvastatin 40 mg oral tablet, 40 mg= 1 tab(s), Oral, Once a day (at bedtime), 6 refills  spironolactone 25 mg oral tablet, 25 mg= 1 tab(s), Oral, Daily, 3 refills  TRIAMCINOLONE 0.025% CREAM, 1 yanira, TOP, BID,? ?Not taking: PT STATED  warfarin 10 mg oral tablet, See Instructions, 1 refills  Allergies  No Known Allergies  Social History  Alcohol  Current, Beer, 1-2 times per week, 07/25/2018  Current, Beer, 1-2 times per week, 12/10/2015  Employment/School  Unemployed, 05/02/2018  Employed, Highest education level: Some college., 12/10/2015  Exercise  Self assessment: Fair condition., 12/10/2015  Home/Environment  Lives with Significant other. Living situation: Home/Independent. Home equipment: BLOOD PRESSURE MONITOR. Alcohol abuse in household: No. Substance abuse in household: No. Smoker in household: Yes. Injuries/Abuse/Neglect in household: No. Feels unsafe at home: No. Safe place to go: Yes. Family/Friends available for support: Yes., 12/10/2015  Nutrition/Health  Type of diet: NO PORK, COUMADIN., 12/10/2015  Substance Abuse - Denies Substance Abuse, 07/08/2014  Never, 07/25/2018  Never, 12/10/2015  Tobacco  10 or more cigarettes (1/2 pack or more)/day in last 30 days, N/A, 02/06/2019  Current every day smoker, Yes, Ready to change: Yes., 11/29/2018  Current every day  smoker, Cigarettes, Yes, 2 per day., 11/12/2018  Current every day smoker, Cigarettes, No, 11/07/2018  Current every day smoker Use:. Cigarettes Type:. 3 per day. 30 year(s)., 09/20/2018  Current some day smoker Use:. Cigarettes Type:., 07/25/2018  Current every day smoker Use:. Cigarettes Type:. 3 per day., 07/11/2018  Current some day smoker, Cigarettes, 3 per day., 05/02/2018  Light tobacco smoker, Cigarettes, 02/24/2016  Current some day smoker, Cigarettes, 12/10/2015  Light tobacco smoker, 11/25/2015  Cigarettes, 07/20/2014  Family History  Hypertension.: Mother.  Stroke: Father.

## 2022-05-02 NOTE — HISTORICAL [facility]
This is a historical note converted from Robb. Formatting and pictures may have been removed.  Please reference Robb for original formatting and attached multimedia. Chief Complaint  Right arm  History of Present Illness  66 Years?old ?RHD?Male?non-smoker?presents to?sports medicine clinic?for?follow upevaluation?for right distal radius fracture.?  ?   Patient reports that pain has improved since having short arm cast placed. Patient denies any numbness, swelling, tingling or worsening pain in his upper extremity. Patient reports minimal pain at the proximal wrist. Patient reports his pain has improved from prior assessment 4 weeks ago. He has no new complaints at this time.  ?   SHARON: FOOSH on 12/5/21  Previous Treatment: Short arm cast placed on 12/14/21  Current pain level: 2/10 (rated as?moderate) ?without medication?. Quality described as sharp  Modifying Factors: ?worse with/after activity; improved with rest;????  Associated symptoms:?no numbness and tingling;?swelling noted to wrist and fingers of right hand;?no skin changes;?no weakness;?moderate decreased in ROM?due to pain.  Activity:?sedentary; full ADLs;?pain interferes with function/daily activity (mild)  PMHx: Afib, CHF, HFrEF with an EF of 30%, HTN  Review of Systems  10 review of systems negative except as stated in HPI  Physical Exam  Vitals & Measurements  HR:?82(Peripheral)? BP:?102/62?  HT:?182.00?cm? WT:?95.300?kg? BMI:?28.77?  General: well developed; well nourished; cooperative  PSYCH: alert and oriented with?appropriate mood and affect  SKIN: inspection and palpation of skin and soft tissue normal; no scars noted on upper/lower extremities  CV: vascular integrity noted; +2 symmetrical pulses, no edema  NEURO: sensation intact by light touch; DTRs +2 bilateral and symmetrical  LYMPH: no LAD noted  ?   RUE  Inspection: Patient in R short arm cast that is intact  Skin: no rash noted  Palpation: nontender  ROM: FPROM  Sensation: R/U/M nerves  intact to light touch  Motor: AIN/PIN/U nerves intact  Vascular: +2 RP; capillary refill < 5 seconds  ?  Assessment/Plan  ?1.?Distal radius fracture, right?S52.501A  DX:?Patient is 5 weeks out from distal radius fracture. Patient has significantly improved from prior?visit. Short arm cast removed at todays visit. Neurovascularly intact. No concern for compartment syndrome. Xray today shoes no displacement of fracture.  Imaging:?radiological studies ordered and independently reviewed; discussed with patient; pending radiologist interpretation  Plan for Fracture Management: Plan is?to remove short arm cast  Stabilization/Immobilization: Wrist splint  Activity: Activity as tolerated  Therapy:?HEP  Medication:?OTC NSAIDs or Tylenol prn  RTC:?4 weeks PRN  ?   2.?Hypertension?I10  Nonsteroidal anti-inflammatory drugs (NSAIDs) may disrupt control of blood pressure in hypertensive patients and increase their risk of morbidity, mortality, and the costs of care. In general, people with high blood pressure should use acetaminophen or possibly aspirin for over-the-counter pain relief. Discuss with your primary health care provider if the use of any NSIADs (such as ibuprofen, ketoprofen, naproxen sodium, or ?meloxicam) is appropriate for you.  ?   3.?Tobacco user?Z72.0  ??I recommend avoidance and cessation of all tobacco product use and cigarette smoking, due to the harmful impact on musculoskeletal health, as well as overall health.  ?   Faculty Attestation:?Ran Reyes?was seen in?Sports Medicine Clinic.??Discussed with  at the time of the visit.?History of Present Illness, Physical Exam, and Assessment and Plan reviewed. Treatment plan is reasonable and appropriate. Compliance with treatment recommendations is important.??Radiology images independently reviewed and agree with fellow interpretation.?- Az Florian MD   Medications  Advair HFA 45 mcg-21 mcg/inh inhalation aerosol, 2 puff(s), INH,  BID  Aspir-Low 81 mg oral delayed release tablet, 81 mg= 1 tab(s), Oral, Daily, 6 refills  Atrovent HFA 17 mcg/inh inhalation aerosol, 2 puff(s), INH, QID, 6 refills  Imdur 30 mg oral tablet, extended release, 30 mg= 1 tab(s), Oral, qAM, 6 refills  Lidocaine Viscous, 20 mL, Oral, Pre Op  Lidocaine Viscous 2% mucous membrane solution, 20 mL, Oral, Pre Op  Lidocaine Viscous 2% mucous membrane solution, 2 %= 40 mL, Oral, As Directed  lisinopril 5 mg oral tablet, 5 mg= 1 tab(s), Oral, Daily, 6 refills  meperidine 50 mg/mL injectable solution, 50 mg= 1 mL, IV, As Directed  midazolam 5 mg/mL injectable solution, 6 mg= 1.2 mL, IV, As Directed  simvastatin 40 mg oral tablet, 40 mg= 1 tab(s), Oral, Once a day (at bedtime), 6 refills  spironolactone 25 mg oral tablet, 25 mg= 1 tab(s), Oral, Daily, 6 refills  Toprol-XL 25 mg oral tablet, extended release, 25 mg= 1 tab(s), Oral, Daily, 6 refills  torsemide 20 mg oral tablet, 20 mg= 1 tab(s), Oral, Daily, 6 refills  Xarelto 20mg Tablet, 20 mg, Oral, With Supper, 6 refills  Diagnostic Results  (12/14/2021 08:55 CST XR Forearm Right 2 Views)  FINDINGS:?  No acute fracture or malalignment is identified of the right forearm.  The right elbow and right wrist articulations remain congruent on the  provided views.?  ?  Previously described minimal distal metaphyseal lucency of the right  radius and minimal cortical disruption is not as conspicuous on the  current examination. No new acute fracture or malalignment is  identified of the right wrist. The joint spaces are maintained.  ?  There is no aggressive-appearing bone lesion or abnormal periosteal  reaction. No soft tissue gas or calcification. Bone mineralization is  diminished.  ?  ?  IMPRESSION:  Previously described findings suspicious for nondisplaced distal  metaphyseal fracture of the right radius are not as conspicuous in  comparison with prior radiographs of 12/6/2021. Correlation with point  tenderness on physical  examination is recommended. No new acute  osseous abnormality of the right forearm or right wrist is identified [1]  ?  My Interpretation: Agree with findings above. Patient with nondisplaced fracture at distal radius on the right with disruption in cortex at metaphysis. 2 views of wrist today show ongoing healing with callus present. alignment has no significant change. no abnormal widening of the DRUJ or abnormal ulnar variance, scapholunate diastasis or disi or visi. 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     [1]?XR Forearm Right 2 Views; Soren GARCIA, Max Acosta 12/14/2021 08:55 CST

## 2022-05-02 NOTE — HISTORICAL [facility] C[facility]ER
This is a historical note converted from Robb. Formatting and pictures may have been removed.  Please reference Robb for original formatting and attached multimedia. Chief Complaint  COUGH & LEFT SIDE PAIN; RIGHT LEG PAIN  History of Present Illness  Mr. Reyes?is a 66-year-old gentleman?with past medical history of?A. fib (on Xarelto), CHF (EF?30% 2019), HTN, tobacco use, and?HLD?who presented to the clinic?with complaints of cough?associated with?left side pain and?chronic?right leg pain.??The patient reports that the cough started approximately 2 days ago and is mild in character. ?It is also associated with?left?mid axillary?pleuritic pain?in the same region where he received a chest tube during a hospitalization?approximately 1 year ago for pneumonia. ?On a scale of 1-10 the patient?characterized the pain as a 2?and says that it only seems to hurt?when he takes?a deep breath and?or is coughing?persistently. ?He is afebrile, vital signs are stable,?no mucus production with cough,?no shortness of breath,?no radiation of pain,?and no other?signs or symptoms?of respiratory infection. ?Patient denies any sick contacts and had a recent negative Covid test. ?As for her leg pain the patient says that?it is a shooting pain?that starts in his lower back on the right side?and is aggravated?by walking. ?He says the pain is localized?to the lateral region of thigh and lower leg?and is associated with?numb/?tingling feeling that almost feels like?water trickling down?his leg. ?He also noticed that the leg gets warm?often.?Patient reports that?he?is currently living?in a motel?and [facility]. ?When asked if?he has tried anything for his?cough?he reported?that it was not bothering him?that much?and only mild.? He feels well otherwise?and?was requesting medication refills?which will be provided for him today.  Review of Systems  Constitutional: no fever/ chills, no weight loss/gain, no  night sweats  HEENT: no vision changes, no sore throat, no ear pain, no sinus pain/congestion, no nasal congestion/drainage  Respiratory:?(+) mild?cough, no wheezing, no shortness of breath  Cardiovascular: no chest pain, no palpitations,?(+) edema  Gastrointestinal: no abd px, no nausea, vomiting, or diarrhea, no hematochezia/melena  Genitourinary: no dysuria, no urinary frequency or urgency, no hematuria,?(+) urinary hesitancy (chronic)  Heme/Lymph: no abnormal bruising or bleeding  Endocrine: no heat or cold intolerance, no polyuria, or polydipsia  Musculoskeletal:?(+) lower back pain, radicular/shooting pain?down lateral right leg, no joint swelling  Integumentary: no skin rash, no abnormal lesions  Neurologic: no headache, no dizziness, no weakness, (+) numbness/tingling in the right leg?associated with?radicular pain  Psych: no anxiety, no depression, no SI/HI  Physical Exam  Vitals & Measurements  T:?36.9? ?C (Oral)? HR:?69(Peripheral)? RR:?20? BP:?128/83?  HT:?184.00?cm? WT:?91.800?kg? BMI:?27.11?  General:?WD/WN,?No acute distress. ?Sitting comfortably?in exam room  HEENT: NC/AT, EOMI, PERRLA, Normal conjunctiva,?Normal hearing, MMM,?mild pharyngeal erythema  Neck: Supple, Non-tender, no thyromegaly, no LAD  Respiratory: Lungs are CTAB, No W/C/R, no increased work to breathe, equal breath sounds bilaterally, Symmetrical chest wall expansion. ?No consolidation?or decreased breath sounds appreciated?in the region?of pleuritic pain.  Cardiovascular: RRR, S1/S2, No m/r/g,?trace pedal edema?present bilaterally?with?significant vein engorgement?primarily in the right foot, pedal pulses 1+ bilaterally,?radial pulses 2+ bilaterally  Gastrointestinal: Soft, NT/ND, bowel sounds present  Musculoskeletal: No tenderness, no swelling, no rubor, appropriate ROM  Integumentary: Warm, dry,?skin?slightly hyperpigmented?and lower?legs?up to mid shin level, no rash, onychomycosis, no other?abnormal lesions  appreciated  Neurologic: A&O x 3, No gross focal deficits, Cranial Nerves II-XII are grossly intact.  Cognition and Speech: Oriented, Speech clear and coherent, Functional cognition intact.  Psychiatric: Cooperative, Appropriate mood & affect  Assessment/Plan  1.?ATRIAL FIBRILLATION?I48.20  -Regular rhythm?on auscultation today  -Continue?Xarelto 20 mg?daily?for anticoagulation, metoprolol?succinate 25 mg daily  -Following with cardiology  Ordered:  ?  2.?Tobacco user?Z72.0  -currently smoking a 2-3 cigarettes a day  -has 40+ pack year history  -Patient meets criteria/needs a low dose CT screening  -ordered LDCT scan today  Ordered:  Referral to Lung Cancer Screening, 40+ year history of Tobacco use, Tobacco user  ?  3.?CHF (congestive heart failure)?I50.9  -NI-CMP with prior reported LHC that was negative for obstructive CAD  -NYHA II-III with EF 30%?from TTE?Feb 2019  -Continuing GDMT with Lisinopril,?Lasix, Spironolactone and, metoprolol succinate 25mg per cards recs  -Continue Aspirin, Imdur, Simvastatin, and Xarelto, patient endorses compliance  -consider repeat echo if symptoms such as [place], PND, Peripheral edema start to worsen  Ordered:  ?  4.?Cough?R05  -Patient reports that the cough is mild?but?associated with?pleuritic?pain?in the mid axillary?region.?No additional symptoms?and vital signs stable  -Ordered LDCT scan for lung cancer screening  -Patient advised?that if pain worsens?or?he begins to experience symptoms such as?fever, productive cough, shortness of breath, chest pain?to return for further evaluation.  Ordered:  ?  5.?Low back pain radiating to right lower extremity?M54.5  Associated Rt leg pain  -Radicular, shooting pain for the past few months. Patient denies?trauma?to the area  -Previously prescribed?tramadol?but reported that it did not help  -Ordered?XR lumbar spine?to assess for any?bony pathology?that may contribute to the radiculopathy  -May need to further assess for?disc  herniation?with MRI  -Due to pain in the leg increasing?with walking,?ordered?US NIVA arterial?to assess for?PAD  ?  6.?Need for zoster vaccine?Z23  -Shingrix?vaccine?given today  Ordered:  ?  Colonoscopy:?will put an order for repeat  Lung cancer screening:?Low-dose CT ordered  Immunizations: Shingrix- first dose?received; patient reports that he will be getting the Covid vaccine  Referrals  Clinic Follow up, *Est. 01/04/22 9:45:00 CST, Will need CBC/CMP, lipid panel, UA for this next visit, Order for future visit, CHF (congestive heart failure), [facility]  Referral to Lung Cancer Screening, 40+ year history of Tobacco use, Tobacco user   Problem List/Past Medical History  Ongoing  ATRIAL FIBRILLATION  CHF (congestive heart failure)  Dyslipidemia  HYPERTENSION  Low back pain radiating to right lower extremity  Obesity  Skin lesion of right ear  Tobacco user  Historical  Cardiac ejection fraction 30%  Procedure/Surgical History  Drainage of Left Pleural Cavity with Drainage Device, Percutaneous Approach (04/18/2019)  Transesophageal Echo (for Surgery) (None) (05/04/2016)  cardiac stent   Medications  Aspir-Low 81 mg oral delayed release tablet, 81 mg= 1 tab(s), Oral, Daily, 6 refills  Atrovent HFA 17 mcg/inh inhalation aerosol, 2 puff(s), INH, QID, 6 refills,? ?Not Taking, Completed Rx  furosemide 20 mg oral tablet, See Instructions, 6 refills  Imdur 30 mg oral tablet, extended release, 30 mg= 1 tab(s), Oral, qAM, 6 refills  Lidocaine Viscous, 20 mL, Oral, Pre Op  Lidocaine Viscous 2% mucous membrane solution, 20 mL, Oral, Pre Op  Lidocaine Viscous 2% mucous membrane solution, 2 %= 40 mL, Oral, As Directed  lisinopril 5 mg oral tablet, 5 mg= 1 tab(s), Oral, Daily, 6 refills  meperidine 50 mg/mL injectable solution, 50 mg= 1 mL, IV, As Directed  midazolam 5 mg/mL injectable solution, 6 mg= 1.2 mL, IV, As Directed  simvastatin 40 mg oral tablet, 40 mg= 1 tab(s), Oral, Once a day (at bedtime), 6  refills  spironolactone 25 mg oral tablet, 25 mg= 1 tab(s), Oral, Daily, 6 refills  Toprol-XL 25 mg oral tablet, extended release, 25 mg= 1 tab(s), Oral, Daily, 2 refills  Xarelto 20mg Tablet, 20 mg, Oral, With Supper, 6 refills  Allergies  No Known Allergies  Social History  Abuse/Neglect  No, No, Yes, 03/30/2021  Alcohol  Current, Beer, Daily, Alcohol use interferes with work or home: No. Others hurt by drinking: No. Household alcohol concerns: No., 12/10/2020  Employment/School  Disabled, 03/30/2021  Exercise  Exercise type: Walking., 09/13/2021  Financial/Legal Situation  Social Security Disability, 03/30/2021  Home/Environment  Lives with Alone. Living situation: Homeless/Shelter., 03/30/2021    [employer], 03/30/2021  Nutrition/Health  Regular, 09/13/2021  Sexual  Gender Identity Identifies as male., 05/02/2019  Spiritual/Cultural  Cheondoism, No, 01/09/2020  Substance Use - Denies Substance Abuse, 07/08/2014  Marijuana, 09/13/2021  Tobacco  4 or less cigarettes(less than 1/4 pack)/day in last 30 days, N/A, 05/10/2021  Family History  Hypertension.: Mother.  Stroke: Father.  Immunizations  Vaccine Date Status   zoster vaccine, inactivated 09/13/2021 Given   tetanus/diphtheria/pertussis, acel(Tdap) 09/17/2019 Given   Health Maintenance  Health Maintenance  ???Pending?(in the next year)  ??? ??OverDue  ??? ? ? ?Colorectal Screening due??02/08/20??and every 1??year(s)  ??? ? ? ?Alcohol Misuse Screening due??05/29/21??and every 1??year(s)  ??? ??Due In Future?  ??? ? ? ?Obesity Screening not due until??01/01/22??and every 1??year(s)  ??? ? ? ?Smoking Cessation not due until??01/01/22??and every 1??year(s)  ??? ? ? ?Diabetes Screening not due until??02/26/22??and every 1??year(s)  ??? ? ? ?HF-Heart Failure Education not due until??02/26/22??and every 1??year(s)  ??? ? ? ?Hypertension Management-BMP not due until??02/26/22??and every 1??year(s)  ??? ? ? ?Aspirin Therapy for CVD Prevention not due  until??03/30/22??and every 1??year(s)  ???Satisfied?(in the past 1 year)  ??? ??Satisfied?  ??? ? ? ?ADL Screening on??09/13/21.??Satisfied by Fe Bateman LPN  ??? ? ? ?Aspirin Therapy for CVD Prevention on??03/30/21.??Satisfied by Jennifer Haddad DO  ??? ? ? ?Blood Pressure Screening on??09/13/21.??Satisfied by Fe Bateman LPN  ??? ? ? ?Body Mass Index Check on??09/13/21.??Satisfied by Fe Bateman LPN  ??? ? ? ?COPD Maintenance-Spirometry on??07/08/21.??Satisfied by Courtney Dowell  ??? ? ? ?Coronary Artery Disease Maintenance-Lipid Lowering Therapy on??03/30/21.??Satisfied by Jennifer Haddad DO  ??? ? ? ?Depression Screening on??09/13/21.??Satisfied by Fe Bateman LPN  ??? ? ? ?Diabetes Screening on??02/26/21.??Satisfied by Jerald Alexandra  ??? ? ? ?Hypertension Management-Blood Pressure on??09/13/21.??Satisfied by Fe Bateman LPN  ??? ? ? ?Influenza Vaccine on??03/30/21.??Satisfied by Katty Jackson  ??? ? ? ?Lipid Screening on??10/07/20.??Satisfied by Demetrio Foley Jr.  ??? ? ? ?Obesity Screening on??09/13/21.??Satisfied by Fe Bateman LPN  ??? ? ? ?Smoking Cessation on??03/30/21.??Satisfied by Jennifer Haddad DO??Reason: Expectation Satisfied Elsewhere  ??? ? ? ?Zoster Vaccine on??09/13/21.??Satisfied by Fe Bateman LPN  ??? ??Postponed?  ??? ? ? ?Alcohol Misuse Screening on??03/30/21.??Recorded by Jennifer Haddad DO??Reason: Temporary contraindication - reschedule  ??? ??Refused?  ??? ? ? ?HF-LVEF on??09/13/21.??Recorded by iNsh Frost MD??Reason: Patient Refuses  ??? ? ? ?Influenza Vaccine on??03/30/21.??Recorded by Jennifer Haddad DO??Reason: Patient Refuses  ??? ? ? ?Zoster Vaccine on??03/30/21.??Recorded by Jennifer Haddad DO  ?      Reviewed present illness, physical exam, assessment/plan of resident. Case discussed with the resident. Care provided is reasonable and necessary.  Cough with left pleuritic chest pain  No fever, chills, likely viral  with musculosketal, will tx symptomatically

## 2022-05-02 NOTE — HISTORICAL [facility] C[facility]ER
This is a historical note converted from [employer]ner. Formatting and pictures may have been removed.  Please reference [employer] for original formatting and attached multimedia. Admit and Discharge Dates  Admit Date: 02/06/2019  Discharge Date: 02/09/2019  ?  Physicians  Attending Physician - Fernie GARCIA, Christy  Admitting Physician - Fernie GARCIA, Christy  Consulting Physician - Nevaeh GARCIA, Marylou GALINDO  Primary Care Physician - Mara GARCIA, Kvng Ortega  ?  Discharge Diagnosis  CHF, chronic?I50.9,?Chronic CHF?I50.9  Syncope?R55  Syncope/Near syncope?[id number]  Surgical Procedures  No procedures recorded for this visit.  Immunizations  No immunizations recorded for this visit.  ?  Admission Information  This is a 63 y.o AAM with PMH of HFrEF (LVEF 17% - Nov 2018); atrial fibrillation?on Coumadin, HTN, HLD, tobacco use.who presents with a syncopal episode. ?Patient was in his usual state of health?when this morning?around 9:45 AM he got out of his truck?and experienced a syncopal episode that lasted about 2-3 minutes.?? He states the he fell to his right side and hit his head. Patient denies any prodrome inclusive of visual changes, dizziness, palpitations,?feeling flushed,?nausea. Shun weakness, numbness or loss of motor strength. No speech or facial changes. ?He denies any chest pain, shortness of breath.??Reports adequate fluid intake and?denies urinary frequency (states hes on a fluid pill and urinates about 5-6x during the day- denies any high-volume diuresis). ?Had not been engaged in any strenuous activity leading up to the syncopal episode.? Denies any confusion,?seizure activity?or history of falls. He does endorse a R sided frontal headache; 4/10, non radiating and states his right eye feels blurry. He denies any previous episodes of feeling lightheaded or dizzy from a positional change. Does report that he has had syncopal episodes like this in the past (last one was 3 years ago). Endorses compliance  with all his medications but does state that he ran out of his coumadin and has not been in Coumadin clinic recently. Denies bruising but does admit to seeing specks of blood in his stool; denies straining/ hx of constipation. ?Denies large volume of blood in stool. Has been going on for a few months. No weight loss, fever, chills, night sweats. No abdominal discomfort. Currently homeless; lives in and out of shelters.  Hospital Course  Patient was admitted to the medicine service for syncopal episode.? Given syncopal episode, patient was worked up for possible TIA.? MRI neck, MRI head and MRI brain were all negative for acute ischemic changes.? Bubble study on transthoracic echo was negative as well.? Given negative workup for TIA, syncope was determined to be likely cardiogenic in nature.? LVEF noted to be 30% this admission.? At this time, patient cannot receive ICD because he is homeless.? Plan to continue GDMT.? Patient to be discharged with Holter monitor to further evaluate for arrhythmia given syncopal episode and known LVEF 30%.? Patient also noted to have a subtherapeutic INR during this admission.? At the time of admission patient stated that he had not been on Coumadin because he ran out and had not been able to go to Coumadin clinic for refills.? Patient was restarted on Coumadin 10mg daily.? At the time of discharge, patient was clinically and hemodynamically stable.  ?  Significant Findings  Labs Last 24 Hours?  ?Chemistry? Hematology/Coagulation?   Sodium Lvl: 138 mmol/L (02/09/19 04:06:00) PT:?21.4 second(s)?High (02/09/19 04:06:00)   Potassium Lvl: 3.8 mmol/L (02/09/19 04:06:00) INR:?1.86?High (02/09/19 04:06:00)   Chloride: 102 mmol/L (02/09/19 04:06:00) WBC: 7.9 x10(3)/mcL (02/09/19 04:06:00)   CO2: 29 mmol/L (02/09/19 04:06:00) RBC:?3.91 x10(6)/mcL?Low (02/09/19 04:06:00)   Calcium Lvl: 8.6 mg/dL (02/09/19 04:06:00) Hgb:?12.4 gm/dL?Low (02/09/19 04:06:00)   Glucose Lvl: 97 mg/dL (02/09/19  04:06:00) Hct:?37.5 %?Low (02/09/19 04:06:00)   BUN: 11 mg/dL (02/09/19 04:06:00) Platelet: 144 x10(3)/mcL (02/09/19 04:06:00)   Creatinine: 0.8 mg/dL (02/09/19 04:06:00) MCV: 95.9 fL (02/09/19 04:06:00)   BUN/Creat Ratio: 14 (02/09/19 04:06:00) MCH: 31.7 pg (02/09/19 04:06:00)   eGFR-AA: >105 (02/09/19 04:06:00) MCHC: 33.1 gm/dL (02/09/19 04:06:00)   eGFR-EDITH: 104 mL/min (02/09/19 04:06:00) RDW:?14.9 %?High (02/09/19 04:06:00)    MPV:?11.2 fL?High (02/09/19 04:06:00)    Abs Neut: 5.38 x10(3)/mcL (02/09/19 04:06:00)    Neutro Auto: 68 x10(3)/mcL (02/09/19 04:06:00)    Lymph Auto: 20 % (02/09/19 04:06:00)    Mono Auto: 9 % (02/09/19 04:06:00)    Eos Auto: 2 (02/09/19 04:06:00)    Abs Eos: 0.12 x10(3)/mcL (02/09/19 04:06:00)    Basophil Auto: 0 (02/09/19 04:06:00)    Abs Neutro: 5.38 x10(3)/mcL (02/09/19 04:06:00)    Abs Lymph: 1.58 x10(3)/mcL (02/09/19 04:06:00)    Abs Mono: 0.73 x10(3)/mcL (02/09/19 04:06:00)    Abs Baso: 0.03 x10(3)/mcL (02/09/19 04:06:00)    IG%: 0 % (02/09/19 04:06:00)    IG#: 0.02 (02/09/19 04:06:00)   ?  ?  Time Spent on discharge  30 min  Objective  Vitals & Measurements  T:?36.8? ?C (Oral)? TMIN:?36.4? ?C (Oral)? TMAX:?36.8? ?C (Oral)? HR:?52(Peripheral)? HR:?88(Monitored)? RR:?18? BP:?102/79? SpO2:?99%?  Physical Exam  Gen: well-nourished, well-developed?64yo AAM?in no acute distress  HEENT: Normocephalic, atraumatic.  Neck: No JVD or carotid bruits. No thyromegaly. No lymphadenopathy.  Heart: RRR, no murmurs, gallops, clicks or rubs.  Lungs: CTAB without rales, wheezes or rhonchi. Normal work of breathing. Chest rise symmetrical on inspiration.  Abd: Soft, non-tender, non-distended and without guarding. No organomegaly. No obvious masses. Bowel sounds present.  : painless scrotal swelling  Extremities: Radial and pedal pulses 2+ bilaterally, no LE edema.  MSK: No obvious deformities. Moves all extremities purposefully.  Neuro: Responds well to commands.  Skin: Warm, dry and without  rashes.  Patient Discharge Condition  Clinically and hemodynamically stable.  Discharge Disposition  Discharge to home a shelter.   Discharge Medication Reconciliation  Continue  aspirin (aspirin 81 mg oral tablet)?81 mg, Oral, Daily  aspirin (aspirin 81 mg oral tablet)?81 mg, Oral, Daily  carvedilol (Coreg 12.5 mg oral tablet)?12.5 mg, Oral, BID  carvedilol (Coreg 12.5 mg oral tablet)?12.5 mg, Oral, BID  furosemide (Lasix 20 mg oral tablet)?20 mg, Oral, Daily  furosemide (Lasix 20 mg oral tablet)?20 mg, Oral, Daily  ipratropium (Atrovent HFA 17 mcg/inh inhalation aerosol)?2 puff(s), INH, QID  ipratropium (Atrovent HFA 17 mcg/inh inhalation aerosol)?2 puff(s), INH, QID  isosorbide mononitrate (isosorbide MONOnitrate 30 mg oral tablet, Extended Release)?30 mg, Oral, qAM  isosorbide mononitrate (isosorbide MONOnitrate 30 mg oral tablet, Extended Release)?30 mg, Oral, qAM  lisinopril (lisinopril 10 mg oral tablet)?10 mg, Oral, Daily  lisinopril (lisinopril 10 mg oral tablet)?10 mg, Oral, Daily  simvastatin (simvastatin 40 mg oral tablet)?40 mg, Oral, Once a day (at bedtime)  simvastatin (simvastatin 40 mg oral tablet)?40 mg, Oral, Once a day (at bedtime)  spironolactone (spironolactone 25 mg oral tablet)?25 mg, Oral, Daily  spironolactone (spironolactone 25 mg oral tablet)?25 mg, Oral, Daily  warfarin (warfarin 10 mg oral tablet)?See Instructions  warfarin (warfarin 10 mg oral tablet)?See Instructions  Discontinue  mupirocin topical (MUPIROCIN 2% OINTMENT)?See Instructions  triamcinolone topical (TRIAMCINOLONE 0.025% CREAM)?1 yanira, TOP, BID  ?  Education and Orders Provided  Heart Failure, Easy-to-Read  What You Need to Know About Warfarin  Vitamin K Foods and Warfarin  Atrial Fibrillation  Near-Syncope, Easy-to-Read  Discharge - 02/09/19 9:50:00 CST, Dis/Trn Another Type Inst not defined, Discharge to homeless shelter?  ?  Follow up  Anytime the conditions worsen, return to clinic or go to ED  [facility] - Cardiology  Clinic  Kvng Terry, within 2 to 4 weeks  Coumadin Clinic in 1 week  remove holter monitor sunday afternoon and return to [facility]  ?      Patient was seen and examined on AM rounds. Management and plan?was discussed with resident. ?Plan discussed with patient. Discussion with patient about compliance of medication. Per cardiology notes-patient has been scheduled for ICD placement, but states he could not receive due to being homeless. Will need close follow up with Cardiology for other options. Patient reports he has a place to stay, but does not stay there all the time. Three are options such as shelters. ?Follow up with Coumadin clinic, Cardiology clinic, and PCP.

## 2022-05-02 NOTE — HISTORICAL [facility] CERNER
This is a historical note converted from [employer]. Formatting and pictures may have been removed.  Please reference [employer] for original formatting and attached multimedia. Chief Complaint  brought in via [facility] for syncopal episode; pt reports head injury; abrasions noted to right scalp; reports headache at this time;  History of Present Illness  This is a 62-year-old -American male with past medical history of?HLD, HTN,?atrial fibrillation (not on anticoagulation) and CHFrEF (LVEF 20%) was seen in the ED after a fall. ?Patient states that he was at [employer] earlier today at 11 AM when he felt?flushed, and when he stood up he fell forward hitting his head but denies syncope,?visual changes, mental status changes, confusion.? No previous episodes of similar events in recent times-had history of syncope 8+ years ago.?Took?two 40mg Lasix tablets yesterday at 7pm; had been out of his his medications for 10+ days; states he has urinated every 15 minutes all of last night resulting in a 20lb weight loss. No changes in oral intake or diet. Also endorsing a headache; front-temporal, non-radiating, 6/10, pressure-type that has been present intermittently. No photophobia, nausea, vomiting. No fevers or chills.? Complaining of b/l neck and shoulder pain soreness.  Denies SOB, chest pain, palpitations, abdominal discomfort, urinary symptoms. No pain of extremities. ?Occasionally gets muscle cramps in lower extremities.  Historically, patient has not been compliant with his Coumadin, was offered to switch to Xarelto but did not follow-up; this was back in April 2017. ?Patient has not been on any anticoagulation since.  ?  Social Hx: drinks 6-8 beers every day; 30pk/yr smoking hx; no IVDU, no drug use.  FHx: father passed of stroke; mother has HTN, DMII  No major surgeries  NKDA  Review of Systems  Constitutional: No fever, chills, weakness or fatigue  HENMT:?No vision changes, no hearing loss, sneezing, + nasal congestion,  runny nose or sore throat.  Respiratory: No shortness of breath, no cough or sputum.  Cardiovascular: No chest pain, chest discomfort. No palpitations or edema.?  Gastrointestinal: No?nausea, vomiting or diarrhea. No constipation. No abdominal pain or blood.  Genitourinary: No burning on urination, no frequency.  Musculoskeletal: No muscle, back pain, joint pain or stiffness. +b/l neck and shoulder pain  Integumentary: No rash or itching.  Neurologic: +headache, dizziness, syncope, paralysis or loss of sensation. +fall  Physical Exam  Vitals & Measurements  T:?36.6? ?C (Oral)? HR:?91(Peripheral)? RR:?22? BP:?128/72? SpO2:?99%? WT:?78?kg? WT:?78?kg?  Gen: NAD  HEENT: NCAT, EOMI, moist oral mucosa,?no?LAD,?no neck masses  Resp: CTAB. Normal inspiratory effort. No cough. No hemoptysis, no crackles, wheezing.  CVS: S1, S2, irregularly regular. no murmur. 2+ pedal pulses, no edema. No JVD.  Abd: NT, ND, soft, no masses. BS2+ all quadrants  MSK: NROM, +small?abrasion to right side of head; +tenderness to lateral flexion of neck  Skin: no rash, no skin changes  Neuro: AAOx3, CNII-VII intact. no focal deficits. Gait normal. No speech changes. Sensation and strength intact b/l. Romberg negative.  ?   Labs Last 24 Hours?  ?Chemistry Hematology/Coagulation   Sodium Lvl: 136 mmol/L (04/19/18 14:03:14) WBC: 8.3 x10(3)/mcL (04/19/18 14:11:27)   Potassium Lvl: 3.6 mmol/L (04/19/18 14:03:14) RBC:?4.44 x10(6)/mcL?Low (04/19/18 14:11:27)   Chloride: 101 mmol/L (04/19/18 14:03:14) Hgb: 13.8 gm/dL (04/19/18 14:11:27)   CO2: 29 mmol/L (04/19/18 14:03:14) Hct: 41 % (04/19/18 14:11:27)   Calcium Lvl: 9 mg/dL (04/19/18 14:03:14) Platelet: 148 x10(3)/mcL (04/19/18 14:11:27)   Glucose Lvl: 90 mg/dL (04/19/18 14:03:14) MCV: 92.3 fL (04/19/18 14:11:27)   BUN: 11 mg/dL (04/19/18 14:03:14) MCH: 31.1 pg (04/19/18 14:11:27)   Creatinine: 1 mg/dL (04/19/18 14:03:14) MCHC: 33.7 gm/dL (04/19/18 14:11:27)   eGFR-AA: 98 mL/min (04/19/18 14:03:15) RDW:  14 % (04/19/18 14:11:27)   eGFR-EDITH:?81 mL/min?Low (04/19/18 14:03:16) MPV: 10.2 fL (04/19/18 14:11:27)   Bili Total: 1 mg/dL (04/19/18 14:03:14) Abs Neut: 6.32 x10(3)/mcL (04/19/18 14:11:27)   Bili Direct:?0.4 mg/dL?High (04/19/18 14:03:14) Neutro Auto: 76 x10(3)/mcL (04/19/18 14:11:28)   Bili Indirect: 0.6 mg/dL (04/19/18 14:03:14) Lymph Auto: 13 % (04/19/18 14:11:28)   AST:?106 unit/L?High (04/19/18 14:03:14) Mono Auto: 9 % (04/19/18 14:11:28)   ALT: 68 unit/L (04/19/18 14:03:14) Eos Auto: 1 (04/19/18 14:11:28)   Alk Phos:?132 unit/L?High (04/19/18 14:03:14) Abs Eos: 0.07 x10(3)/mcL (04/19/18 14:11:28)   Total Protein: 7.7 gm/dL (04/19/18 14:03:14) Basophil Auto: 0 (04/19/18 14:11:28)   Albumin Lvl: 3.6 gm/dL (04/19/18 14:03:14) Abs Neutro: 6.32 x10(3)/mcL (04/19/18 14:11:28)   Globulin:?4.1 gm/mL?High (04/19/18 14:03:14) Abs Lymph: 1.06 x10(3)/mcL (04/19/18 14:11:28)   A/G Ratio: 1 ratio (04/19/18 14:03:14) Abs Mono: 0.78 x10(3)/mcL (04/19/18 14:11:28)   POC Troponin: 0.01 ng/mL (04/19/18 13:45:31) Abs Baso: 0.03 x10(3)/mcL (04/19/18 14:11:28)   ? IG%: 0 % (04/19/18 14:11:28)   ? IG#: 0.02 (04/19/18 14:11:28)   ?  ?  ?  ?Accession:?[id number]  Order:?MRI Brain W/O Contrast  Report Dt/Tm:?04/19/2018 16:00  Report:?  MRI Brain W/O Contrast  ?  REASON FOR STUDY: Dizziness , vertigo  ?  COMPARISON: None.  ?  TECHNIQUE: Multiplanar imaging was performed through the cranial  region using T1, T2, FLAIR, T2 gradient echo, diffusion and ADC map  sequences. ?Study was done without contrast.  ?  ?  FINDINGS:  ?  There is no restricted diffusion or cytotoxic edema. Study is  suboptimal due to motion artifact. Multifocal areas of increased T2  signal involving the cerebral white matter. There is no mass effect.  There is no hydrocephalus. There is no extra-axial fluid collection.  There are old bilateral corona radiata lacunar infarcts. There is  prominent paranasal sinusitis. This involves the ethmoid sinuses  and  maxillary sinuses. Craniovertebral junction and sella turcica are  normal.  ?  IMPRESSION:  ?  Nonspecific bilateral cerebral white matter T2 hyperintensities. This  could reflect chronic ischemia or nonspecific demyelination. There are  old bilateral corona radiata lacunar infarcts.  ?  Prominent paranasal sinusitis.  ?  ?  Accession:?[id number]  Order:?CT Cervical Spine W/O Contrast  Report Dt/Tm:?04/19/2018 14:31  Report:?  History: Injury, neck  Comparison studies:  None  ?  Technique:?  Axial CT images were obtained through the cervical region.  Coronal and sagittal reconstructions obtained from the axial data.  Automatic exposure control was utilized to limit radiation dose.  Contrast: None.  ?  Radiation Dose:?  Total DLP: 533 mGy*cm  ?  DISCUSSION:  ?  Fractures: None.  Alignment: Normal lordosis. No subluxations.  Soft tissues: No abnormalities.  ?  Degenerative changes:  Mild degenerative canal stenosis at C3-4 related to disc osteophyte  complex.  Mild right foraminal narrowing at C3-4 and on the left at C4-5 related  to uncovertebral and facet hypertrophy.  ?  Incidental findings:  Tracheal 1.5 cm diverticulum.  ?  IMPRESSION:  ?  1. No fractures.  ?  2. Ligament, spinal cord and/or vascular abnormalities cannot be  excluded on the basis of this examination.  ?  ?  Accession:?[id number]  Order:?XR Chest 1 View  Report Dt/Tm:?04/19/2018 14:05  Report:?  Indication: Chest pain  ?  Findings: Compared to 9/2/2017. Heart size is normal and lungs are  clear bilaterally. Pulmonary vasculature is normal.  ?  IMPRESSION: No evidence of acute disease.  Assessment/Plan  Fall  - one episode of fall from standing up  - likely secondary to orthostatic hypotension from recent large volume diuresis.  - patient with small abrasion to right side of head  - US carotid ordered for [facility] eval  - tele monitoring for arrhythmias; will monitor HDS closely  ?  CHFrEF  - last known LVEF 25%  - Echo (Feb 2018): Left  ventricular size mildly increased. ?The LVEF is estimated at 20%. ?Moderately dilated left atrium. ?Mildly enlarged right ventricular cavity. ?Thickened aortic valve leaflets noted without stenosis. ?Mitral valve leaflets are mildly thickened with preserved leaflet mobility. ?Severe mitral regurgitation. ?No evidence of significant pericardial effusion is noted.  - consider consulting cardiology for fall/ hypotension in the setting of a-fib and chronic microvascular brain changes  - p.o Lasix 20mg; patient does not appear volume overloaded on exam.  ?  Sinusitis  - MRI brain with findings of sinusitis  - ordered Mucomyst  ?  MSK Pain  - CT cervical spine negative for fractures  - pain on lateral flexion of neck and soreness of shoulders  - no bony abnormality on physical exam  - tenderness to MSK palpation  - likely muscle strain; will treat with acetaminophen for pain control.  ?  Alcohol abuse  - counselled on cutting down  - Ativan 2mg PRN for agitation; one dose tonight  - UDS and BAL ordered  ?  Tobacco abuse  - counselled on cessation  ?  hx of A-fib  - not on any anticoagulation currently  - patient is currently rate controlled  - starting Xarelto  ?  Disposition: Admit to tele with monitor. 62 year old with fall likely secondary to acute volume depletion from diuresis. Patient with CHF; will repeat echo. US carotid to evaluate for etiology of chronic microinfarcts on MRI.   Problem List/Past Medical History  Ongoing  ATRIAL FIBRILLATION  CHF (congestive heart failure)  Dyslipidemia  HYPERTENSION  Tobacco user  Historical  No qualifying data  Procedure/Surgical History  Transesophageal Echo (for Surgery) (None) (05/04/2016), cardiac stent.  Medications  Inpatient  acetaminophen, 1000 mg= 2 tab(s), Oral, q6hr, PRN  acetaminophen, 650 mg= 2 tab(s), Oral, q6hr, PRN  albuterol 2.5 mg/3 mL (0.083%) inhalation solution, 2.5 mg= 3 mL, NEB, q4hr Resp, PRN  aspirin 81 mg oral tablet, CHEWABLE, 81 mg= 1 tab(s), Oral,  Daily  cloNIDine, 0.2 mg= 1 tab(s), Oral, q8hr, PRN  Coreg 12.5 mg oral tablet, 12.5 mg= 1 tab(s), Oral, BID  ibuprofen, 400 mg= 1 tab(s), Oral, q6hr, PRN  isosorbide MONOnitrate 30 mg oral tablet, Extended Release, 30 mg= 1 tab(s), Oral, qAM  Lasix, 20 mg= 1 tab(s), Oral, Daily  Lidocaine Viscous, 20 mL, Oral, Pre Op  Lidocaine Viscous 2% mucous membrane solution, 20 mL, Oral, Pre Op  lisinopril 10 mg oral tablet, 10 mg= 1 tab(s), Oral, Daily  Lovenox, 40 mg= 0.4 mL, Subcutaneous, Daily  NS (0.9% Sodium Chloride) Infusion 500 mL, 500 mL, IV  simvastatin 40 mg oral tablet, 40 mg= 1 tab(s), Oral, Once a day (at bedtime)  spironolactone 25 mg oral tablet, 25 mg= 1 tab(s), Oral, Daily  Zofran, 4 mg= 2 mL, IV Push, q4hr, PRN  Zofran, 8 mg= 4 mL, IV Piggyback, q4hr, PRN  Home  aspirin 81 mg oral tablet, 81 mg= 1 tab(s), Oral, Daily, 2 refills  Coreg 12.5 mg oral tablet, 12.5 mg= 1 tab(s), Oral, BID, 6 refills  isosorbide MONOnitrate 30 mg oral tablet, Extended Release, 30 mg= 1 tab(s), Oral, qAM  Lasix 40 mg oral tablet, 40 mg= 1 tab(s), Oral, Daily, 6 refills  Lasix 40 mg oral tablet, 40 mg= 1 tab(s), Oral, Daily, 2 refills  lisinopril 10 mg oral tablet, 10 mg= 1 tab(s), Oral, Daily, 3 refills  simvastatin 40 mg oral tablet, 40 mg= 1 tab(s), Oral, Once a day (at bedtime), 6 refills  spironolactone 25 mg oral tablet, 25 mg= 1 tab(s), Oral, Daily, 3 refills  Allergies  No Known Allergies  Social History  Alcohol  Current, Beer, 1-2 times per week, 12/10/2015  Current, Beer, 3-5 times per week, 12/24/2014  Employment/School  Employed, Highest education level: Some college., 12/10/2015  Exercise  Self assessment: Fair condition., 12/10/2015  Home/Environment  Lives with Significant other. Living situation: Home/Independent. Home equipment: BLOOD PRESSURE MONITOR. Alcohol abuse in household: No. Substance abuse in household: No. Smoker in household: Yes. Injuries/Abuse/Neglect in household: No. Feels unsafe at home: No. Safe  place to go: Yes. Family/Friends available for support: Yes., 12/10/2015  Nutrition/Health  Type of diet: NO PORK, COUMADIN., 12/10/2015  Substance Abuse - Denies Substance Abuse, 07/08/2014  Never, 12/10/2015  Never, 09/05/2015  Tobacco  Light tobacco smoker, Cigarettes, 02/24/2016  Current some day smoker, Cigarettes, 12/10/2015  Light tobacco smoker, 11/25/2015  Cigarettes, 07/20/2014  Family History  Hypertension.: Mother.  Stroke: Father.      Addendum:  ?  62 year old  male presented to [facility] ER on 4/19/18 after a fall at [employer] earlier today. Known PMH of A. fib, CHFrEF (EF of 20% per Echo in Feb 2018), severe  Describes that he was standing and felt warm and flushed. Denies syncope but says people around him said he may have blacked out. Denies any previous recent episodes. He was followed by cardio clinic here and seen almost 1 year ago at which time he was recommended to start Xarelto but this is not listed in his medication list. Previous cardio clinic notes reported that patient has had issues with medication compliance in the past. He reports he ran out of his Lasix tablets for more than 10 days and got it re-filled recently. Then took two 40 mg tablets last night and urinated a large amount all night. Did get an MRI done on admission which revealed nonspecific white matter hyperintensities and old lacunar infarcts. Currently appears euvolemic but fall may have been due to volume depletion after taking a high dosage of Lasix after not being on it for some time. We will admit him to monitor him overnight and ensure no arrhythmias. Will also obtain carotid US in a.m. Starting patient on Xarelto for A. fib/VTE ppx. Will repeat echo as well as has been almost 3 months since last one and patient has had questionable compliance with medication.   I was present with the resident during the history and physical examination.  ???  x[ ] I discussed the case with the resident and agree with the  findings and plan as documented in the residents note.  [ ] I discussed the case with the resident and agree with the findings and plan as documented in the residents note except:  Near syncope  Took extra diuretics after being out for several days, says he urinated all night  Volume depletion  Fluids but monitor closely as patient has hx of HFrEF  This am patient is awake, alert, oriented  Scattered rhonchi likely from tobacco use  ?

## 2022-05-02 NOTE — HISTORICAL [facility] CERNER
This is a historical note converted from Robb. Formatting and pictures may have been removed.  Please reference Cerduarte for original formatting and attached multimedia. Chief Complaint  Pt c/o SOB with exertion and BLE swelling, abd swelling, must sleep sitting up x 3 days, hx chf  History of Present Illness  Mr. Reyes is a 66-year-old AAM with PMH of A. fib (on Xarelto), CHF (EF 30% in 2019), HTN, tobacco use, and HLD who presents to the ED with complaints of 4 days of shortness of breath.? Patient states that he has been having some coughing and some sputum production on the side and has noted some off-and-on wheezing.? However, patient states that he has noted some pretty significant increased swelling of his legs bilaterally.? Patient states that he has noticed his right is more swollen than his left.? Patient states that he has been having increasing shortness of breath with his swelling symptomatology.? Patient denies any chest pains, palpitations, fevers, chills, but does state that he has been a lot less active since he has been living in a hotel for the last month or so.? Patient denies any recent long travels or extended car/plane rides.? Patient denies any recent sick contacts as well.? Of note, patient states that his PCP had recently mentioned that he should double his home Lasix dose.? Patient states that he has not been able to lay flat recently, and gets a drowning sensation when he does try. ?Patient does state that his shortness of breath is?worsened with any exertion. ?Patient has no other complaints at this time.  ?  In the ED, patient was found to be tachypneic with an RR of 22, satting well at 98% on room air, hypokalemic with K of 3.2, BNP of 1153, and hyperbilirubinemic with T bili of 3.1.? CXR depicted no acute abnormalities.? [facility] was consulted for acute CHF exacerbation.  ?  PMH: A. fib (on Xarelto), CHF (EF of 30% in 2019), HTN, tobacco use, and HLD  SH: Denies  FH: Mother-HTN,  father-stroke  Social: EtOH-beer 2-3 daily, tobacco-1 pack/week, drugs-denies, currently living at a hotel  Review of Systems  Constitutional: no fever, fatigue, weakness  Eye: Admits to some blurry vision in the left eye?(periodic), eye redness, drainage, or pain  ENMT: no sore throat, ear pain, sinus pain/congestion, nasal congestion/drainage  Respiratory:?Admits to shortness of breath,?admits to?periodic wheezing, admits to periodic coughing?(productive with clear sputum)  Cardiovascular: no chest pain, no palpitations, admits to lower extremity edema?(R>?L)  Gastrointestinal: no nausea, vomiting, or diarrhea. No abdominal pain  Genitourinary: no dysuria,?admits to polyuria, denies urgency, no hematuria  Hema/Lymph: no abnormal bruising or bleeding  Endocrine: no heat or cold intolerance, no excessive thirst  Musculoskeletal: no muscle or joint pain, no joint swelling  Integumentary: no skin rash or abnormal lesion  Neurologic: no headache, no dizziness, no weakness or numbness, admits to some lightheadedness  Physical Exam  Vitals & Measurements  T:?36.1? ?C (Temporal Artery)? HR:?89(Peripheral)? RR:?22? BP:?118/82? SpO2:?100%? WT:?98?kg? BMI:?29.59?  General: Patient sitting comfortably in bed, in no acute distress?  Eye: PERRLA, EOMI, clear conjunctiva, eyelids normal,?some blurry vision from left eye?on examination?(fields of view nonspecific), fundus exam limited?red reflex noted bilaterally  HENT: Head-normocephalic and atraumatic, grossly normal hearing  Neck: no?gross thyromegaly or lymphadenopathy, trachea midline, supple, no palpable thyroid nodules  Respiratory: Diffuse?Rales noted bilaterally,?mild expiratory wheezes noted bilaterally, increased rate of breathing  Cardiovascular: regular rate and rhythm without murmurs. ?No gallops or rubs JVD noted to mid neck on right. ?Capillary refill within normal limits.? 3+ pitting edema bilateral lower extremities?R>?L,?pulses 2+ in all 4  extremities  Gastrointestinal: Obese abdomen,?soft, non-tender, non-distended with normal bowel sounds, without masses to palpation, negative Irvine, negative McBurneys  Genitourinary: no CVA tenderness to palpation  Musculoskeletal: full range of motion of all extremities/spine without limitation or discomfort  Integumentary: no rashes or skin lesions present, positive Albrecht test on right leg  Neurologic: no signs of peripheral neurological deficit, motor/sensory function intact?in all 4 extremities, cranial nerves II through XII intact  Psychiatric: ?alert and oriented, cognitive function intact, cooperative with exam, good eye contact, judgement and insight intact, mood and affect full range.  Assessment/Plan  1.? Volume overload?2/2?acute?exacerbation?of CHF  2.? Hx of?HFrEF?(EF of 30%?in 2019)  -Continue with?diuresis with IV Lasix?60 twice daily  -NYHA class?II-III, patient has had?LHC in past?which was negative for obstructive CAD  -Last echo?was in February 2019?which depicted EF of 30%  -Will order repeat echo  -Patient currently satting well on room air, will continue to monitor  -Continue home?GDMT?with?ASA,?lisinopril,?metoprolol succinate 25 mg, and spironolactone  -Can restart p.o. Lasix?when patient is closer to euvolemia  ?  3.? Hyperbilirubinemia  -T bili of?3.1,?appears indirect  -No transaminitis noted, patient currently not having any?right upper quadrant pain  -Pending?RUQ US, holding n.p.o.  -Pending?hep panel  ?  4.? Cough?with sputum production  5.? Tobacco use  6.? Left lower lobe nodule  -CT thorax in 10/5/2021 depicted 14 mm solid subpleural nodule in left lower lobe.? Recommended a 3-month follow-up with low-dose CT or PET scan at the time.  -Begin as needed duo nebs every 6 hour?for wheezing  -We will continue to monitor, believe not?in a exacerbation of this time  ?  7.? Hx of A. fib  -FTT2HQ1-MCBr of?2  -EKG depicts?NSR  -We will continue?with?home Xarelto?20 daily  -Some concern  for potential DVT?given?patients?increased swelling on right side versus left and?positive Albrecht test on right  -D-dimer of 0.55, pending?ultrasound?bilateral?lower extremities?for further evaluation  -will continue telemetry  ?  8.? Blurry vision of left eye  -No concern for optic neuritis at this time,?will consult ophthalmology in a.m. for further recommendations  ?  9. ?Hypokalemia  -K of 3.2  -We will replete with p.o. and IV?potassium chloride,?monitor with a.m. CMP  ?  DVT PPX: Xarelto 20?with supper  GI PPX: N/A  Diet: N.p.o. for RUQ US,?okay to start?cardiac diet after  Access: PIV  IVF: N/A, strict Is and Os  ABX: N/A  CODE STATUS: Full code  ?  Dispo: Admit to?telemetry?for?acute exacerbation of HFpEF, pending echo, pending right upper quadrant ultrasound, receiving IV Lasix,?on room air,?pending CT head  ?  ?  ------------------------------------------------------------------------------------------------------------------  Agree with the above H&P. Mr. Tong is a 66 M who is presenting with 3 days of BLE edema (R>L), orthopnea, cough productive of clear/white sputum, and [place] (cant walk 30 feet). At baseline, he can walk a mile. Denies CP, nausea, diaphoresis. For the past 3 days hes doubled his lasix dose (from 20mg BID to 40mg BID, but this hasnt helped). Also says he has blurry vision in his left eye for the past 3 days.  PMH: Afib, HFrEF, COPD, HTN, Mitral Regurgitation  Meds - as on [facility] except he denies taking metoprolol  PFH - father w/ MI  ?  PSocH - Mr. Reyes used to have a job, earning a good salary, but hes currently unemployed due to disability. [facility]. Is having a difficult time with this in general. No health insurance. Has occasional EtOH in moderation, no illicit drugs. Smokes 1 pack per week tobacco.  ?  Physical Exam revealing of an intact neuro exam other than blurry vision in left eye (in all fields). Patient still able to say  how many fingers Im holding up. Otherwise, all visual fields intact.  ?  Lab workup revealing of hypokalemia, mixed hyperbilirubinemia, anemia  ?  CXR - increased interstitial markings bilaterally  ?  CHF Exacerbation - Last TTE showed EF 30% w/ moderate-severe MR. Given low EF w/ significant MR, will give aggressive diuresis w/ Lasix 60mg BID IV. BP well-controlled currently. Will restart home BP meds. Will order updated TTE. Troponin negative and EKG w/ no changes compared to prior EKGs. Will continue metoprolol and lisinopril as there are no signs of hypoperfusion and there is no ELIZABETH.  ?  Hyperbilirubinemia - ordering RUQ US, hepatitis panel  ?  Left-sided blurry vision - There is some concern for possible CVA; however, hes been having symptoms for 3 days and is out of the window for TPA or thrombectomy (blurry vision would be posterior circulation anyway?so wouldnt be able?to do?thrombectomy). Will restart home ASA and will start atorvastatin 40mg daily. Patient is self-pay and there doesnt seem to be much benefit of ordering head scan. Will hold off for now. Discuss in AM. Given his hx Afib, need to consider the possibility of cardiogenic emboli. He is getting dose of his home Xarelto 20mg x1 tonight. Will discuss in AM regarding starting 15mg BID dosing. Ordered TTE w/ bubble study. He had carotid US 4/2018 w/ no hemodynamically significant stenosis. Consulting Optho in AM.  ?  Elevated D-dimer - ordered BLE US. Is on Xarelto.  ?  Harris Noriega PGY 2   Problem List/Past Medical History  Ongoing  ATRIAL FIBRILLATION  CHF (congestive heart failure)  Dyslipidemia  HYPERTENSION  Low back pain radiating to right lower extremity  Obesity  Skin lesion of right ear  Tobacco user  Historical  Cardiac ejection fraction 30%  Procedure/Surgical History  Drainage of Left Pleural Cavity with Drainage Device, Percutaneous Approach (04/18/2019)  Transesophageal Echo (for Surgery) (None) (05/04/2016)  cardiac stent    Medications  Inpatient  aspirin, 81 mg= 1 tab(s), Oral, Daily  DuoNeb 0.5 mg-2.5 mg/3 mL inhalation solution, 3 mL, NEB, q6hr Resp, PRN  Imdur 30 mg oral tablet, extended release, 30 mg= 1 tab(s), Oral, qAM  KCL 10 mEq Fei - Peripheral Line, 10 mEq= 100 mL, IV Piggyback, q1hr  Lasix, 60 mg= 6 mL, IV Push, q12hr  Lidocaine Viscous, 20 mL, Oral, Pre Op  Lidocaine Viscous 2% mucous membrane solution, 20 mL, Oral, Pre Op  Lidocaine Viscous 2% mucous membrane solution, 2 %= 40 mL, Oral, As Directed  lisinopril 5 mg oral tablet, 5 mg= 1 tab(s), Oral, Daily  meperidine 50 mg/mL injectable solution, 50 mg= 1 mL, IV, As Directed  midazolam 5 mg/mL injectable solution, 6 mg= 1.2 mL, IV, As Directed  potassium chloride 20 mEq oral powder for reconstitution, 40 mEq= 2 packet(s), Oral, Now  simvastatin 40 mg oral tablet, 40 mg= 1 tab(s), Oral, Once a day (at bedtime)  spironolactone 25 mg oral tablet, 25 mg= 1 tab(s), Oral, Daily  Toprol-XL 25 mg oral tablet, extended release, 25 mg= 1 tab(s), Oral, Daily  Xarelto, 20 mg= 2 tab(s), Oral, With Supper  Zofran, 4 mg= 2 mL, IV Push, q4hr, PRN  Home  Aspir-Low 81 mg oral delayed release tablet, 81 mg= 1 tab(s), Oral, Daily, 6 refills  Atrovent HFA 17 mcg/inh inhalation aerosol, 2 puff(s), INH, QID, 6 refills,? ?Not Taking, Completed Rx  furosemide 20 mg oral tablet, See Instructions, 6 refills  Imdur 30 mg oral tablet, extended release, 30 mg= 1 tab(s), Oral, qAM, 6 refills  lisinopril 5 mg oral tablet, 5 mg= 1 tab(s), Oral, Daily, 6 refills  potassium chloride 20 mEq oral TABLET extended release, 20 mEq= 1 tab(s), Oral, BID  simvastatin 40 mg oral tablet, 40 mg= 1 tab(s), Oral, Once a day (at bedtime), 6 refills  spironolactone 25 mg oral tablet, 25 mg= 1 tab(s), Oral, Daily, 6 refills  Toprol-XL 25 mg oral tablet, extended release, 25 mg= 1 tab(s), Oral, Daily, 2 refills  Xarelto 20mg Tablet, 20 mg, Oral, With Supper, 6 refills  Allergies  No Known Allergies  Social  History  Abuse/Neglect  No, 11/15/2021  No, 10/07/2021  No, No, Yes, 2021  Alcohol  Current, 3-5 times per week, 11/15/2021  Current, Beer, Daily, Alcohol use interferes with work or home: No. Others hurt by drinking: No. Household alcohol concerns: No., 12/10/2020  Employment/School  Disabled, 2021  Exercise  Exercise type: Walking., 2021  Financial/Legal Situation  Social Security Disability, 2021  Home/Environment  Lives with Alone. Living situation: Homeless/Shelter., 2021    [employer], 2021  Nutrition/Health  Regular, 2021  Sexual  Gender Identity Identifies as male., 2019  Spiritual/Cultural  Confucianist, No, 2020  Substance Use - Denies Substance Abuse, 2014  Marijuana, 2021  Tobacco  4 or less cigarettes(less than 1/4 pack)/day in last 30 days, No, 11/15/2021  4 or less cigarettes(less than 1/4 pack)/day in last 30 days, N/A, 05/10/2021  Family History  Hypertension.: Mother.  Stroke: Father.  Immunizations  Vaccine Date Status   zoster vaccine, inactivated 2021 Given   tetanus/diphtheria/pertussis, acel(Tdap) 2019 Given   Lab Results  Labs Last 24 Hours?  ?Chemistry? Hematology/Coagulation?   Sodium Lvl: 141 mmol/L (11/15/21 13:25:00) WBC: 5.3 x10(3)/mcL (11/15/21 13:25:00)   Potassium Lvl:?3.2 mmol/L?Low (11/15/21 13:25:00) RBC:?3.66 x10(6)/mcL?Low (11/15/21 13:25:00)   Chloride: 104 mmol/L (11/15/21 13:25:00) Hgb:?11.1 gm/dL?Low (11/15/21 13:25:00)   CO2: 28 mmol/L (11/15/21 13:25:00) Hct:?35.8 %?Low (11/15/21 13:25:00)   Calcium Lvl: 9.3 mg/dL (11/15/21 13:25:00) Platelet: 186 x10(3)/mcL (11/15/21 13:25:00)   Magnesium Lvl: 2.1 mg/dL (11/15/21 16:58:00) MCV: 97.8 fL (11/15/21 13:25:00)   Glucose Lvl: 93 mg/dL (11/15/21 13:25:00) MCH: 30.3 pg (11/15/21 13:25:00)   EA.2 mg/dL (11/15/21 16:58:00) MCHC: 31 gm/dL (11/15/21 13:25:00)   BUN: 13.6 mg/dL (11/15/21 13:25:00) RDW:?16.3 %?High (11/15/21 13:25:00)   Creatinine: 1.03  mg/dL (11/15/21 13:25:00) MPV:?10.9 fL?High (11/15/21 13:25:00)   Est Creat Clearance Ser: 78.71 mL/min (11/15/21 13:59:42) Abs Neut: 3.14 x10(3)/mcL (11/15/21 13:25:00)   eGFR-AA: 94 (11/15/21 13:25:00) Neutro Auto: 59 % (11/15/21 13:25:00)   eGFR-EDITH:?77 mL/min/1.73 m2?Low (11/15/21 13:25:00) Lymph Auto: 29 % (11/15/21 13:25:00)   Bili Total:?3.1 mg/dL?High (11/15/21 13:25:00) Mono Auto: 11 % (11/15/21 13:25:00)   Bili Direct:?1.2 mg/dL?High (11/15/21 13:25:00) Eos Auto: 1 % (11/15/21 13:25:00)   Bili Indirect:?1.9 mg/dL?High (11/15/21 13:25:00) Abs Eos: 0 x10(3)/mcL (11/15/21 13:25:00)   AST: 24 unit/L (11/15/21 13:25:00) Basophil Auto: 0 % (11/15/21 13:25:00)   ALT: 11 unit/L (11/15/21 13:25:00) Abs Neutro: 3.14 x10(3)/mcL (11/15/21 13:25:00)   Alk Phos: 103 unit/L (11/15/21 13:25:00) Abs Lymph: 1.5 x10(3)/mcL (11/15/21 13:25:00)   Total Protein: 7 gm/dL (11/15/21 13:25:00) Abs Mono: 0.6 x10(3)/mcL (11/15/21 13:25:00)   Albumin Lvl: 3.7 gm/dL (11/15/21 13:25:00) Abs Baso: 0 x10(3)/mcL (11/15/21 13:25:00)   Globulin: 3.3 gm/dL (11/15/21 13:25:00) NRBC%: 0.0 (11/15/21 13:25:00)   A/G Ratio: 1.1 ratio (11/15/21 13:25:00) IG%: 0 % (11/15/21 13:25:00)   Phosphorus: 3.3 mg/dL (11/15/21 16:58:00) IG#: 0.02 (11/15/21 13:25:00)   BNP:?1153 pg/mL?High (11/15/21 13:25:00) PT:?31.4 second(s)?High (11/15/21 16:58:00)   Hgb A1c: 4.7 % (11/15/21 16:58:00) INR:?3.07?High (11/15/21 16:58:00)   Total CK: 71 U/L (11/15/21 13:25:00) PTT:?51.7 second(s)?High (11/15/21 16:58:00)   CK MB: 1.2 ng/mL (11/15/21 13:25:00) D-Dimer:?0.55 mcg/ml FEU?High (11/15/21 16:58:00)   Troponin-I: 0.023 ng/mL (11/15/21 13:25:00)    Diagnostic Results  Accession:?[id number]  Order:?XR Chest 2 Views  Report Dt/Tm:?11/15/2021 14:06  Report:?  CHEST X-RAY, PA AND LATERAL VIEWS  ?  HISTORY: Chest Pain  ?  COMPARISON:?  10/7/2021.  ?  FINDINGS:  ?  Blunting of the posterior and lateral costophrenic sulci bilaterally.  No lobar consolidations or  pneumothoraces.  Enlarged cardiac silhouette with central vascular prominence.  No acute bony pathology.  Soft tissues within normal limits.  ?  IMPRESSION:  ?  Central vascular prominence.  Trace bilateral pleural effusions versus pleural thickening.  ?      Spine appears normal, range of motion is not limited, no muscle or joint tenderness

## 2022-05-02 NOTE — HISTORICAL [facility] C[facility]
This is a historical note converted from Cerduarte. Formatting and pictures may have been removed.  Please reference Cerduarte for original formatting and attached multimedia. Admit and Discharge Dates  Admit Date: 11/15/2021  Discharge Date: 11/17/2021  Physicians  Attending Physician - Karsten GARCIA, Rivka BECKMAN  Admitting Physician - Karsten GARCIA, Rivka BECKMAN  Primary Care Physician - Santosh GARCIA, Nish GALINDO  Discharge Diagnosis  Acute exacerbation of CHF (congestive heart failure)?I50.9  Moderate - Severe Mitral Regurgitation  Surgical Procedures  No procedures recorded for this visit.  Immunizations  No immunizations recorded for this visit.  Admission Information  Mr. Reyes is a 66-year-old AAM with PMH of A. fib (on Xarelto), CHF (EF 30% in 2019), HTN, tobacco use, and HLD who presented to the ED with BLE edema (R>L) and SOB, concerning for CHF exacerbation. He was taking Lasix 20mg BID but doubled that dose a few days before admission. Doubling the dose didnt help, so he went to the ED and was admitted to the hospital. Of note, he also developed left-sided?blurry vision a few days prior to admission.  ?  Hospital Course  During the admission, his symptoms greatly improved over the course of a couple days with Lasix 60mg IV BID. He was able to lie back on his back with a slight angle (back to his baseline; much improved from admission), his BLE edema improved a lot but still had trace pitting edema, and his SOB had improved. TTE revealed EF 25-30%, unchanged from prior EF of 30%. On discharge he was sent home with the same GDMT except his home lasix 20mg BID was switched to torsemide 20mg daily with instructions to take torsemide 10mg if he starts getting lightheaded. During his hospital stay he was also found to have hyperbilirubinemia, and RUQ US showed mildly thickened gallbladder wall and mild hepatomegaly-Rec?for f/u outpatient. He was discharged with follow up in [facility], and he was referred to [facility]  Cardiology to evaluate for valve repair/replacement given CHF and mitral regurgitation. Finally, with regards to his left eye?blurry vision, opthalmology evaluated the patient in the hospital and will see him outpatient for suspected PVD (posterior vitreous detachment) vs CC (cortical cataract) and also for suspected glaucoma.  Time Spent on discharge  >45 minutes  Objective  Physical Exam  General - Appears comfortable, appropriately conversive  Mental Status - alert and oriented x 3, speaking in logical, relevant sentences  HEENT - No pre/post-auricular, anterior/posterior cervical, or supraclavicular?lymph nodes appreciated; no rhinorrhea  Cardiac - RRR, no murmurs, rubs, or gallops; no edema in LE  Respiratory - breathing comfortably; clear to ascultation bilaterally  Abdominal - nondistended,?soft, nontender to palpation  Extremities - trace pretibial pitting edema  Skin - no rashes or bruises seen on skin  Patient Discharge Condition  Improved, Stable  Discharge Disposition  Home  Faculty addendum:? Patient seen and examined in the am of 11/17/21.? Discharge plan discussed with residents and reflected accurately.   Discharge Medication Reconciliation  Prescribed  torsemide (torsemide 20 mg oral tablet)?20 mg, Oral, Daily  Continue  aspirin (Aspir-Low 81 mg oral delayed release tablet)?81 mg, Oral, Daily  ipratropium (Atrovent HFA 17 mcg/inh inhalation aerosol)?2 puff(s), INH, QID  ipratropium (Atrovent HFA 17 mcg/inh inhalation aerosol)?2 puff(s), INH, QID  ipratropium (Atrovent HFA 17 mcg/inh inhalation aerosol)?2 puff(s), INH, QID  ipratropium (Atrovent HFA 17 mcg/inh inhalation aerosol)?2 puff(s), INH, QID  isosorbide mononitrate (Imdur 30 mg oral tablet, extended release)?30 mg, Oral, qAM  lisinopril (lisinopril 5 mg oral tablet)?5 mg, Oral, Daily  metoprolol (Toprol-XL 25 mg oral tablet, extended release)?25 mg, Oral, Daily  potassium chloride (potassium chloride 20 mEq oral TABLET extended release)?20  mEq, Oral, BID  rivaroxaban (Xarelto 20mg Tablet)?20 mg, Oral, With Supper  simvastatin (simvastatin 40 mg oral tablet)?40 mg, Oral, Once a day (at bedtime)  spironolactone (spironolactone 25 mg oral tablet)?25 mg, Oral, Daily  Discontinue  furosemide (furosemide 20 mg oral tablet)?See Instructions  ondansetron (Zofran 2 mg/mL injectable solution)?4 mg, IV Push, q4hr, PRN nausea  Education and Orders Provided  Heart Failure  Discharge - 11/17/21 11:57:00 CST, Home, OK to discharge once CM is able to talk to patient about financial assistance and after pt gets meds to beds?  Follow up  Follow up in [facility] in 3-4 weeks  Report to ED if symptoms return / worsen  Am referring patient to [facility] to establish care  Follow up in [facility] clinic in 1 week  Car Seat Challenge  No Qualifying Data

## 2022-05-09 ENCOUNTER — HOSPITAL ENCOUNTER (EMERGENCY)
Facility: HOSPITAL | Age: 67
Discharge: HOME | End: 2022-05-09
Attending: INTERNAL MEDICINE
Payer: MEDICARE

## 2022-05-09 VITALS
BODY MASS INDEX: 25.62 KG/M2 | WEIGHT: 189.13 LBS | OXYGEN SATURATION: 99 % | SYSTOLIC BLOOD PRESSURE: 142 MMHG | DIASTOLIC BLOOD PRESSURE: 81 MMHG | RESPIRATION RATE: 20 BRPM | TEMPERATURE: 98 F | HEART RATE: 77 BPM | HEIGHT: 72 IN

## 2022-05-09 DIAGNOSIS — M54.2 NECK PAIN: ICD-10-CM

## 2022-05-09 DIAGNOSIS — S01.512A LACERATION OF ORAL CAVITY, INITIAL ENCOUNTER: ICD-10-CM

## 2022-05-09 DIAGNOSIS — S62.336A CLOSED DISPLACED FRACTURE OF NECK OF FIFTH METACARPAL BONE OF RIGHT HAND, INITIAL ENCOUNTER: Primary | ICD-10-CM

## 2022-05-09 PROCEDURE — 99284 EMERGENCY DEPT VISIT MOD MDM: CPT | Mod: 25

## 2022-05-09 PROCEDURE — 29125 APPL SHORT ARM SPLINT STATIC: CPT | Mod: RT

## 2022-05-09 RX ORDER — IPRATROPIUM BROMIDE 17 UG/1
AEROSOL, METERED RESPIRATORY (INHALATION)
COMMUNITY
Start: 2021-11-17 | End: 2024-03-18

## 2022-05-09 RX ORDER — HYDROCODONE BITARTRATE AND ACETAMINOPHEN 5; 325 MG/1; MG/1
1 TABLET ORAL EVERY 4 HOURS PRN
Qty: 12 TABLET | Refills: 0 | Status: SHIPPED | OUTPATIENT
Start: 2022-05-09 | End: 2022-11-30

## 2022-05-09 RX ORDER — AMOXICILLIN 500 MG/1
500 CAPSULE ORAL 3 TIMES DAILY
Qty: 30 CAPSULE | Refills: 0 | Status: SHIPPED | OUTPATIENT
Start: 2022-05-09 | End: 2022-05-19

## 2022-05-09 RX ORDER — SPIRONOLACTONE 25 MG/1
25 TABLET ORAL
COMMUNITY
Start: 2022-02-02 | End: 2024-01-31 | Stop reason: SDUPTHER

## 2022-05-09 RX ORDER — FLUTICASONE PROPIONATE AND SALMETEROL XINAFOATE 45; 21 UG/1; UG/1
AEROSOL, METERED RESPIRATORY (INHALATION)
COMMUNITY
Start: 2022-01-04 | End: 2024-03-18

## 2022-05-09 RX ORDER — SIMVASTATIN 40 MG/1
40 TABLET, FILM COATED ORAL
COMMUNITY
Start: 2021-12-30 | End: 2023-02-09 | Stop reason: SDUPTHER

## 2022-05-09 RX ORDER — LISINOPRIL 5 MG/1
5 TABLET ORAL
COMMUNITY
Start: 2021-12-30 | End: 2022-11-30

## 2022-05-09 RX ORDER — ISOSORBIDE MONONITRATE 30 MG/1
30 TABLET, EXTENDED RELEASE ORAL EVERY MORNING
COMMUNITY
Start: 2022-03-02 | End: 2022-10-06 | Stop reason: ALTCHOICE

## 2022-05-09 RX ORDER — TORSEMIDE 20 MG/1
10 TABLET ORAL DAILY
COMMUNITY
Start: 2022-02-16 | End: 2023-02-15 | Stop reason: ALTCHOICE

## 2022-05-09 RX ORDER — METOPROLOL SUCCINATE 25 MG/1
25 TABLET, EXTENDED RELEASE ORAL DAILY
COMMUNITY
Start: 2022-03-02 | End: 2024-03-18
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
## 2022-05-20 ENCOUNTER — HOSPITAL ENCOUNTER (OUTPATIENT)
Dept: RADIOLOGY | Facility: HOSPITAL | Age: 67
Discharge: HOME | End: 2022-05-20
Attending: INTERNAL MEDICINE
Payer: MEDICARE

## 2022-05-20 DIAGNOSIS — R20.0 NUMBNESS: ICD-10-CM

## 2022-05-20 PROCEDURE — 93925 LOWER EXTREMITY STUDY: CPT

## 2022-05-23 LAB
LEFT ANT TIBIAL SYS PSV: 24 CM/S
LEFT CFA PSV: 120 CM/S
LEFT DORSALIS PEDIS PSV: 51 CM/S
LEFT POPLITEAL PSV: 62 CM/S
LEFT POST TIBIAL SYS PSV: 42 CM/S
LEFT PROFUNDA SYS PSV: 97 CM/S
LEFT SUPER FEMORAL DIST SYS PSV: 87 CM/S
LEFT SUPER FEMORAL MID SYS PSV: 67 CM/S
LEFT SUPER FEMORAL PROX SYS PSV: 401 CM/S
OHS CV LEFT LOWER EXTREMITY ABI (NO CALC): 0.87
OHS CV RIGHT ABI LOWER EXTREMITY (NO CALC): 0.95
RIGHT ANT TIBIAL SYS PSV: 39 CM/S
RIGHT CFA PSV: 141 CM/S
RIGHT DORSALIS PEDIS PSV: 28 CM/S
RIGHT POPLITEAL PSV: 95 CM/S
RIGHT POST TIBIAL SYS PSV: 70 CM/S
RIGHT PROFUNDA SYS PSV: 127 CM/S
RIGHT SUPER FEMORAL DIST SYS PSV: 169 CM/S
RIGHT SUPER FEMORAL MID SYS PSV: 252 CM/S
RIGHT SUPER FEMORAL PROX SYS PSV: 108 CM/S

## 2022-06-01 ENCOUNTER — OFFICE VISIT (OUTPATIENT)
Dept: OPHTHALMOLOGY | Facility: CLINIC | Age: 67
End: 2022-06-01
Payer: MEDICARE

## 2022-06-01 VITALS — HEIGHT: 72 IN | BODY MASS INDEX: 25.38 KG/M2 | WEIGHT: 187.38 LBS

## 2022-06-01 DIAGNOSIS — H25.812 COMBINED FORMS OF AGE-RELATED CATARACT OF LEFT EYE: ICD-10-CM

## 2022-06-01 DIAGNOSIS — H40.1112 PRIMARY OPEN ANGLE GLAUCOMA (POAG) OF RIGHT EYE, MODERATE STAGE: Primary | ICD-10-CM

## 2022-06-01 PROCEDURE — 92083 EXTENDED VISUAL FIELD XM: CPT | Mod: PBBFAC,PO | Performed by: STUDENT IN AN ORGANIZED HEALTH CARE EDUCATION/TRAINING PROGRAM

## 2022-06-01 PROCEDURE — 92134 CPTRZ OPH DX IMG PST SGM RTA: CPT | Mod: PBBFAC,PO | Performed by: OPHTHALMOLOGY

## 2022-06-01 PROCEDURE — 92083 EXTENDED VISUAL FIELD XM: CPT | Mod: PBBFAC,PO | Performed by: OPHTHALMOLOGY

## 2022-06-01 PROCEDURE — 76512 OPH US DX B-SCAN: CPT | Mod: 50,PBBFAC,PO | Performed by: STUDENT IN AN ORGANIZED HEALTH CARE EDUCATION/TRAINING PROGRAM

## 2022-06-01 PROCEDURE — 76512 OPH US DX B-SCAN: CPT | Mod: LT,PBBFAC,PO | Performed by: OPHTHALMOLOGY

## 2022-06-01 PROCEDURE — 99214 OFFICE O/P EST MOD 30 MIN: CPT | Mod: PBBFAC,PO | Performed by: STUDENT IN AN ORGANIZED HEALTH CARE EDUCATION/TRAINING PROGRAM

## 2022-06-01 PROCEDURE — 92133 CPTRZD OPH DX IMG PST SGM ON: CPT | Mod: PBBFAC,PO | Performed by: OPHTHALMOLOGY

## 2022-06-01 PROCEDURE — 92133 CPTRZD OPH DX IMG PST SGM ON: CPT | Mod: PBBFAC,PO | Performed by: STUDENT IN AN ORGANIZED HEALTH CARE EDUCATION/TRAINING PROGRAM

## 2022-06-01 RX ORDER — SODIUM CHLORIDE 0.9 % (FLUSH) 0.9 %
10 SYRINGE (ML) INJECTION
Status: DISCONTINUED | OUTPATIENT
Start: 2022-06-01 | End: 2023-03-09 | Stop reason: HOSPADM

## 2022-06-01 RX ORDER — PROPARACAINE HYDROCHLORIDE 5 MG/ML
1 SOLUTION/ DROPS OPHTHALMIC
Status: CANCELLED | OUTPATIENT
Start: 2022-06-09 | End: 2022-06-09

## 2022-06-01 RX ORDER — CYCLOPENTOLATE HYDROCHLORIDE 10 MG/ML
1 SOLUTION/ DROPS OPHTHALMIC
Status: CANCELLED | OUTPATIENT
Start: 2022-06-09 | End: 2022-06-09

## 2022-06-01 RX ORDER — LATANOPROST 50 UG/ML
1 SOLUTION/ DROPS OPHTHALMIC DAILY
Qty: 2.5 ML | Refills: 1 | Status: SHIPPED | OUTPATIENT
Start: 2022-06-01 | End: 2022-11-30

## 2022-06-01 RX ORDER — TROPICAMIDE 10 MG/ML
1 SOLUTION/ DROPS OPHTHALMIC
Status: CANCELLED | OUTPATIENT
Start: 2022-06-09 | End: 2022-06-09

## 2022-06-01 RX ORDER — PHENYLEPHRINE HYDROCHLORIDE 25 MG/ML
1 SOLUTION/ DROPS OPHTHALMIC
Status: CANCELLED | OUTPATIENT
Start: 2022-06-09 | End: 2022-06-09
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
## 2022-06-02 ENCOUNTER — OFFICE VISIT (OUTPATIENT)
Dept: CARDIOLOGY | Facility: CLINIC | Age: 67
End: 2022-06-02
Payer: MEDICARE

## 2022-06-02 ENCOUNTER — TELEPHONE (OUTPATIENT)
Dept: OPHTHALMOLOGY | Facility: CLINIC | Age: 67
End: 2022-06-02
Payer: MEDICARE

## 2022-06-02 VITALS
RESPIRATION RATE: 20 BRPM | HEIGHT: 72 IN | HEART RATE: 49 BPM | WEIGHT: 186.31 LBS | OXYGEN SATURATION: 100 % | TEMPERATURE: 98 F | SYSTOLIC BLOOD PRESSURE: 121 MMHG | BODY MASS INDEX: 25.24 KG/M2 | DIASTOLIC BLOOD PRESSURE: 80 MMHG

## 2022-06-02 DIAGNOSIS — I73.9 PVD (PERIPHERAL VASCULAR DISEASE): ICD-10-CM

## 2022-06-02 DIAGNOSIS — Z72.0 TOBACCO USER: ICD-10-CM

## 2022-06-02 DIAGNOSIS — I50.9 CHRONIC CONGESTIVE HEART FAILURE, UNSPECIFIED HEART FAILURE TYPE: ICD-10-CM

## 2022-06-02 DIAGNOSIS — I25.10 ARTERIOSCLEROSIS OF CORONARY ARTERY: Primary | ICD-10-CM

## 2022-06-02 DIAGNOSIS — Z79.899 MEDICATION MANAGEMENT: ICD-10-CM

## 2022-06-02 DIAGNOSIS — Z01.818 PREOPERATIVE TESTING: ICD-10-CM

## 2022-06-02 DIAGNOSIS — I48.20 CHRONIC ATRIAL FIBRILLATION: ICD-10-CM

## 2022-06-02 DIAGNOSIS — I38 VHD (VALVULAR HEART DISEASE): ICD-10-CM

## 2022-06-02 DIAGNOSIS — E78.5 DYSLIPIDEMIA: ICD-10-CM

## 2022-06-02 DIAGNOSIS — I10 HYPERTENSION, UNSPECIFIED TYPE: ICD-10-CM

## 2022-06-02 PROBLEM — H40.1112 PRIMARY OPEN ANGLE GLAUCOMA (POAG) OF RIGHT EYE, MODERATE STAGE: Status: ACTIVE | Noted: 2022-06-02

## 2022-06-02 PROBLEM — H25.812 COMBINED FORMS OF AGE-RELATED CATARACT OF LEFT EYE: Status: ACTIVE | Noted: 2022-06-02

## 2022-06-02 PROCEDURE — 99215 OFFICE O/P EST HI 40 MIN: CPT | Mod: PBBFAC | Performed by: INTERNAL MEDICINE

## 2022-06-02 RX ORDER — NICOTINE 7MG/24HR
1 PATCH, TRANSDERMAL 24 HOURS TRANSDERMAL DAILY
Qty: 14 PATCH | Refills: 3 | Status: SHIPPED | OUTPATIENT
Start: 2022-06-02 | End: 2023-01-12 | Stop reason: SDUPTHER

## 2022-06-02 RX ORDER — ASPIRIN/CALCIUM CARB/MAGNESIUM 325 MG
4 TABLET ORAL
Qty: 108 LOZENGE | Refills: 3 | Status: SHIPPED | OUTPATIENT
Start: 2022-06-02 | End: 2024-03-18

## 2022-06-02 NOTE — PROGRESS NOTES
[facility] Cardiology  OUTPATIENT OFFICE VISIT NOTE    SUBJECTIVE:      HPI: Ran Reyes Jr. is a 66 y.o. male w/ PMH of Atrial Fibrillation on Xarelto, HFrEF 40%, severe MR, PVD, HTN/HHD, HLD, CAD s/p PCI and balloon x1 (approx. 10 years prior, per patient), and tobacco use (5 cigs/day) who presents for follow up     States he feels well overall   Denies any current complaints   Cataract surgery upcoming; requesting risk stratification   Able to walk 3 blocks and climb 2 flights of stairs prior to SOB; no CP or claudication   Has RLE lateral neuropathy   Missed his appt for mitral clip 2/2 office location confusion; will refer again   Dopper as below with PVD; will refer to [facility]   Providing nicotine lozenges and patches  Decrease toprol   Compliant with meds   No refills needed     Doppler LE US:  5/20/22  The right lower extremity demonstrated a 50-75% stenosis based on PSV located at the level of the mid SFA. There was a loss of multiphasic waveforms at the level of the tibial arteries.  The KERRY on the right is mildly abnormal.  The right lower extremity demonstrated mild arterial flow reduction.     The left lower extremity demonstrated a greater than or equal to 75% stenosis based on PSV located at the level of the proximal SFA. There was a loss of multiphasic waveforms at the level of the mid SFA.  The KERRY on the left is moderately abnormal.  The left lower extremity demonstrated moderate arterial flow reduction.    Transesophageal echocardiogram on 1/5/2022  Severe posteriorly directed mitral regurgitation (ERO 0.5cm2, regurgitant volume 84mL). Mitral regurgitation is type 1a (normal leaflet  motion).  Overall left ventricular systolic function is moderately reduced, LVEF 41%.  Left ventricular cavity is severely enlarged.  Severe left ventricular hypertrophy.  Global hypokinesis noted.  Right ventricular cavity is moderately enlarged. Right ventricular systolic function is moderately reduced.  Atrial septum is  "intact with no evidence of shunt. Contrast study with agitated saline is mildly positive consistent with intracardiac  shunt at atrial level.  No thrombus or spontaneous contrast noted within the left atrium or left atrial appendage.  Mild-to-moderate tricuspid regurgitation.  No aortic stenosis. No aortic regurgitation present. Aortic valve is tricuspid.  Normal pulmonic valve structure and function.No evidence of pericardial effusion.    ROS:  (+)  (-) Chest pain, palpitations, SOB, dizziness, syncope, edema, PND, orthopnea, claudication, cough, fever, chills, abdominal pain, vomiting, diarrhea, dysuria, bleeding    OBJECTIVE:     Vital signs:   /80 (BP Location: Right arm, BP Method: Medium (Automatic))   Pulse (!) 49   Temp 98.1 °F (36.7 °C) (Oral)   Resp 20   Ht 5' 11.65" (1.82 m)   Wt 84.5 kg (186 lb 4.6 oz)   SpO2 100%   BMI 25.51 kg/m²      Physical Examination:  General:  Well-nourished, well-developed, no acute distress, on room air  HEENT: Normocephalic, atraumatic. EOMI.  MMM  Neck: Supple. No obvious JVD.  Normal range of motion.  Respiratory: CTAB.  No obvious crackles wheeze or rhonchi.  Normal work of breathing.  Cardiovascular:  Irregular rhythm, bradycardic. +systolic murmur. Warm extremities.  Peripheral pulses intact.  No edema.  Gastrointestinal:  Soft, nontender, nondistended.  Normal bowel sounds.  Neurologic: ANOx3.  Answering questions/following commands appropriately.  No FND    Current Outpatient Medications:     ADVAIR HFA 45-21 mcg/actuation HFAA inhaler, INHALE 2 PUFFS TWICE DAILY RINSE MOUTH & THROAT AFTER EACH USE, Disp: , Rfl:     HYDROcodone-acetaminophen (NORCO) 5-325 mg per tablet, Take 1 tablet by mouth every 4 (four) hours as needed for Pain., Disp: 12 tablet, Rfl: 0    ipratropium (ATROVENT HFA) 17 mcg/actuation inhaler, Inhale into the lungs., Disp: , Rfl:     isosorbide mononitrate (IMDUR) 30 MG 24 hr tablet, Take 30 mg by mouth every morning., Disp: , Rfl: "     latanoprost (XALATAN) 0.005 % ophthalmic solution, Place 1 drop into both eyes once daily., Disp: 2.5 mL, Rfl: 1    lisinopriL (PRINIVIL,ZESTRIL) 5 MG tablet, Take 5 mg by mouth., Disp: , Rfl:     metoprolol succinate (TOPROL-XL) 25 MG 24 hr tablet, Take 12.5 mg by mouth once daily., Disp: , Rfl:     rivaroxaban (XARELTO) 20 mg Tab, Take 20 mg by mouth., Disp: , Rfl:     simvastatin (ZOCOR) 40 MG tablet, Take 40 mg by mouth., Disp: , Rfl:     spironolactone (ALDACTONE) 25 MG tablet, Take 25 mg by mouth., Disp: , Rfl:     torsemide (DEMADEX) 20 MG Tab, Take 20 mg by mouth once daily., Disp: , Rfl:     nicotine (NICODERM CQ) 7 mg/24 hr, Place 1 patch onto the skin once daily., Disp: 14 patch, Rfl: 3    nicotine polacrilex 4 MG Lozg, Take 1 lozenge (4 mg total) by mouth as needed (withdrawl from nicotine)., Disp: 108 lozenge, Rfl: 3    Current Facility-Administered Medications:     sodium chloride 0.9% flush 10 mL, 10 mL, Intravenous, PRN, Yahaira Tellez MD     ASSESSMENT & PLAN:     HFrEF  HTN  (LVEF 40% per ADRIENNE 11/16/21) - NYHA Class II -  reports stable [place]  Appears euvolemic and well prefused on exam.  Continue lisinopril 5mg, spironolactone 25mg, torsemide 20mg  Reduce Toprol to 12.5mg daily 2/2 bradycardia  Pt is agreeable to proceed with ICD implantation after valve evaluation      VHD-Severe Mitral Regurgitation  -TTE 11/16/21 mod-severe MR  -ADRIENNE 1/2022 revealed severe MR with regurgitant volume of 84cc- MR is functional  -re-referred to Dr. La for mitraclip evaluation as he missed appt      CAD  s/p PCI and balloon x1 (approx. 10 years prior, per patient)  CAD is quiescent  Continue aspirin 81, simvastatin 40, BB as above, and Imdur 30      PVD  Doppler as above with L>R stenosis   Exercise as tolerated   Referred to Dr. Wagner for eval      Afib on Xarelto  CHADsVASC 3  In afib today but rate controlled  -Continue Xarelto 20   -Continue metoprolol succinate 12.5mg     HLD  -LDL at goal  (49)  -Continue simvastatin     Tobacco abuse  -continues to smoke 5 cigs/day  Counseled on the importance of complete smoking cessation for overall health.  -provided nicotine patchs and lozenges     Cardiac risk stratification   -patient is moderate risk for low risk procedure (cataract)  -hold Xarelto for 2 days prior to surgery  -continue ASA 81 through surgery if feasible      Referral to [facility] for MitraClip and PVD evaluation  RTC 4 months     Lashon Schneider MD

## 2022-06-02 NOTE — PROGRESS NOTES
Cardiology attending addendum  Patient was seen and examined with medical resident Dr. Cecy Schneider. Agree with plan as outlined above. Pt was previously referred to Dr. La for evaluation for genaro-Clip. He missed his appointment as he went to the wrong address. Will refer him again. Will repeat echo post mitraclip if he obtains that to assess for need for ICD.  Unfortunately no room to uptitrate GDMT. He is bradycardiac today and will cut down on toprol to 12.5mg daily.    Pt was also noted to have significant PAD  L>R. Will refer to Dr. Wagner. Continue ASA, statin. Recommended daily walking which he reports he has started doing.  Counseled on the importance of complete smoking cessation. He is interested in trying nicotine patches and gum.  He is scheduled for cataract surgery next week. He is moderate risk for a very low risk procedure. Recommend holding xarelto for 48 hours prior to procedure. If feasible, he should remain on aspirin perioperatively.     Dayanna Johnson MD  Cardiology-[facility]

## 2022-06-02 NOTE — PATIENT INSTRUCTIONS
Referal to Dr La at [facility] for Mitral Clip.  Referral to Dr Wagner at [facility] for PVD.   Decrease dosage of metoprolol to 12.5 mg daily.  Hold Xarelto for 2 days prior to cataract procedure.

## 2022-06-02 NOTE — TELEPHONE ENCOUNTER
06/02/2022 10:40 AM  ODS called as of right now patient's eye surgery is cancelled due to needing to see his cardiologist to obtain clearance.     Called patient he states that he has an appointment to see his cardiologist today, 06/02/2022 at 2:00 pm. I told the patient to call eye clinic to let us know if his cardiologist clears him for surgery. I gave to patient [facility] fax number to give to cardiologist so they can fax clearance. Patient expressed understanding.

## 2022-06-07 ENCOUNTER — TELEPHONE (OUTPATIENT)
Dept: PREADMISSION TESTING | Facility: HOSPITAL | Age: 67
End: 2022-06-07
Payer: MEDICARE

## 2022-06-07 ENCOUNTER — TELEPHONE (OUTPATIENT)
Dept: OPHTHALMOLOGY | Facility: CLINIC | Age: 67
End: 2022-06-07
Payer: MEDICARE

## 2022-06-07 DIAGNOSIS — H25.812 COMBINED FORMS OF AGE-RELATED CATARACT OF LEFT EYE: Primary | ICD-10-CM

## 2022-06-07 DIAGNOSIS — H26.9 CATARACT, LEFT: ICD-10-CM

## 2022-06-07 RX ORDER — PROPARACAINE HYDROCHLORIDE 5 MG/ML
1 SOLUTION/ DROPS OPHTHALMIC
Status: CANCELLED | OUTPATIENT
Start: 2022-06-09 | End: 2022-06-09

## 2022-06-07 RX ORDER — CYCLOPENTOLATE HYDROCHLORIDE 10 MG/ML
1 SOLUTION/ DROPS OPHTHALMIC
Status: CANCELLED | OUTPATIENT
Start: 2022-06-09 | End: 2022-06-09

## 2022-06-07 RX ORDER — TROPICAMIDE 10 MG/ML
1 SOLUTION/ DROPS OPHTHALMIC
Status: CANCELLED | OUTPATIENT
Start: 2022-06-09 | End: 2022-06-09

## 2022-06-07 RX ORDER — PHENYLEPHRINE HYDROCHLORIDE 25 MG/ML
1 SOLUTION/ DROPS OPHTHALMIC
Status: CANCELLED | OUTPATIENT
Start: 2022-06-09 | End: 2022-06-09

## 2022-06-07 RX ORDER — SODIUM CHLORIDE 0.9 % (FLUSH) 0.9 %
10 SYRINGE (ML) INJECTION
Status: DISCONTINUED | OUTPATIENT
Start: 2022-06-07 | End: 2023-03-09 | Stop reason: HOSPADM

## 2022-06-07 NOTE — TELEPHONE ENCOUNTER
06/07/2022 1:24 PM  Spoke with patient about eye surgery being cancelled by Dr. Barba. Patient expressed understanding.     06/07/2022 1:53 PM  Spoke with Naa at Dayanna Johnson MD office she let me know that they sent referrals for the patient to be further evaluated for mitral valve clip, PVD, and ICD. Dr. Johnson also order an echo to be done. I let naa know that the patient did not seem to understand what was going on with his cardiac issues. Naa said they know he isn't understanding.     06/07/2022 2:01 PM  Patient's surgery is cancelled his last appointment was 06/01/2022. Patient has POAG, moderate as of right now he does not have a follow up appointment when should he come in?

## 2022-07-23 ENCOUNTER — HOSPITAL ENCOUNTER (OUTPATIENT)
Facility: HOSPITAL | Age: 67
Discharge: HOME | End: 2022-07-24
Attending: FAMILY MEDICINE | Admitting: INTERNAL MEDICINE
Payer: MEDICARE

## 2022-07-23 DIAGNOSIS — R53.1 WEAKNESS: ICD-10-CM

## 2022-07-23 DIAGNOSIS — A41.9 SEVERE SEPSIS: Primary | ICD-10-CM

## 2022-07-23 DIAGNOSIS — R65.20 SEVERE SEPSIS: Primary | ICD-10-CM

## 2022-07-23 DIAGNOSIS — I48.20 CHRONIC ATRIAL FIBRILLATION: ICD-10-CM

## 2022-07-23 DIAGNOSIS — U07.1 COVID-19: ICD-10-CM

## 2022-07-23 DIAGNOSIS — I50.9 CHRONIC CONGESTIVE HEART FAILURE, UNSPECIFIED HEART FAILURE TYPE: ICD-10-CM

## 2022-07-23 LAB
ALBUMIN SERPL-MCNC: 4 GM/DL (ref 3.4–4.8)
ALBUMIN/GLOB SERPL: 1 RATIO (ref 1.1–2)
ALP SERPL-CCNC: 71 UNIT/L (ref 40–150)
ALT SERPL-CCNC: 26 UNIT/L (ref 0–55)
APPEARANCE UR: CLEAR
APTT PPP: 38.6 SECONDS
AST SERPL-CCNC: 72 UNIT/L (ref 5–34)
BACTERIA #/AREA URNS AUTO: ABNORMAL /HPF
BASOPHILS # BLD AUTO: 0.03 X10(3)/MCL (ref 0–0.2)
BASOPHILS NFR BLD AUTO: 0.5 %
BILIRUB UR QL STRIP.AUTO: NEGATIVE MG/DL
BILIRUBIN DIRECT+TOT PNL SERPL-MCNC: 0.7 MG/DL
BUN SERPL-MCNC: 18.2 MG/DL (ref 8.4–25.7)
CALCIUM SERPL-MCNC: 9.1 MG/DL (ref 8.8–10)
CHLORIDE SERPL-SCNC: 96 MMOL/L (ref 98–107)
CK MB SERPL-MCNC: 0.9 NG/ML
CK SERPL-CCNC: 73 U/L (ref 30–200)
CO2 SERPL-SCNC: 22 MMOL/L (ref 23–31)
COLOR UR AUTO: ABNORMAL
CREAT SERPL-MCNC: 1.87 MG/DL (ref 0.73–1.18)
CRP SERPL-MCNC: 8.8 MG/L
D DIMER PPP IA.FEU-MCNC: 0.29 UG/ML FEU (ref 0–0.5)
EOSINOPHIL # BLD AUTO: 0.13 X10(3)/MCL (ref 0–0.9)
EOSINOPHIL NFR BLD AUTO: 2.2 %
ERYTHROCYTE [DISTWIDTH] IN BLOOD BY AUTOMATED COUNT: 15.4 % (ref 11.5–17)
FERRITIN SERPL-MCNC: 836 NG/ML (ref 21.81–274.66)
FLUAV AG UPPER RESP QL IA.RAPID: NOT DETECTED
FLUBV AG UPPER RESP QL IA.RAPID: NOT DETECTED
GLOBULIN SER-MCNC: 4.2 GM/DL (ref 2.4–3.5)
GLUCOSE SERPL-MCNC: 110 MG/DL (ref 82–115)
GLUCOSE UR QL STRIP.AUTO: NORMAL MG/DL
HCT VFR BLD AUTO: 44.4 % (ref 42–52)
HGB BLD-MCNC: 14.9 GM/DL (ref 14–18)
HYALINE CASTS #/AREA URNS LPF: ABNORMAL /LPF
IMM GRANULOCYTES # BLD AUTO: 0.02 X10(3)/MCL (ref 0–0.04)
IMM GRANULOCYTES NFR BLD AUTO: 0.3 %
KETONES UR QL STRIP.AUTO: NEGATIVE MG/DL
LACTATE SERPL-SCNC: 2.1 MMOL/L (ref 0.5–2.2)
LEUKOCYTE ESTERASE UR QL STRIP.AUTO: 500 UNIT/L
LYMPHOCYTES # BLD AUTO: 1.62 X10(3)/MCL (ref 0.6–4.6)
LYMPHOCYTES NFR BLD AUTO: 28 %
MAGNESIUM SERPL-MCNC: 1.9 MG/DL (ref 1.6–2.6)
MCH RBC QN AUTO: 31.2 PG (ref 27–31)
MCHC RBC AUTO-ENTMCNC: 33.6 MG/DL (ref 33–36)
MCV RBC AUTO: 92.9 FL (ref 80–94)
MONOCYTES # BLD AUTO: 0.79 X10(3)/MCL (ref 0.1–1.3)
MONOCYTES NFR BLD AUTO: 13.7 %
MUCOUS THREADS URNS QL MICRO: ABNORMAL /LPF
NEUTROPHILS # BLD AUTO: 3.2 X10(3)/MCL (ref 2.1–9.2)
NEUTROPHILS NFR BLD AUTO: 55.3 %
NITRITE UR QL STRIP.AUTO: NEGATIVE
NRBC BLD AUTO-RTO: 0.3 %
PH UR STRIP.AUTO: 6.5 [PH]
PLATELET # BLD AUTO: 118 X10(3)/MCL (ref 130–400)
PMV BLD AUTO: 12.8 FL (ref 7.4–10.4)
POTASSIUM SERPL-SCNC: 4.8 MMOL/L (ref 3.5–5.1)
PROT SERPL-MCNC: 8.2 GM/DL (ref 5.8–7.6)
PROT UR QL STRIP.AUTO: ABNORMAL MG/DL
RBC # BLD AUTO: 4.78 X10(6)/MCL (ref 4.7–6.1)
RBC #/AREA URNS AUTO: ABNORMAL /HPF
RBC UR QL AUTO: ABNORMAL UNIT/L
SALICYLATES SERPL-MCNC: <5 MG/DL
SARS-COV-2 RNA RESP QL NAA+PROBE: DETECTED
SODIUM SERPL-SCNC: 135 MMOL/L (ref 136–145)
SP GR UR STRIP.AUTO: 1.01
SQUAMOUS #/AREA URNS LPF: ABNORMAL /HPF
TROPONIN I SERPL-MCNC: 0.03 NG/ML (ref 0–0.04)
UROBILINOGEN UR STRIP-ACNC: NORMAL MG/DL
WBC # SPEC AUTO: 5.8 X10(3)/MCL (ref 4.5–11.5)
WBC #/AREA URNS AUTO: ABNORMAL /HPF
WBC CLUMPS UR QL AUTO: ABNORMAL /HPF
YEAST BUDDING URNS QL: ABNORMAL /HPF

## 2022-07-23 PROCEDURE — 25000003 PHARM REV CODE 250

## 2022-07-23 PROCEDURE — 80053 COMPREHEN METABOLIC PANEL: CPT | Performed by: FAMILY MEDICINE

## 2022-07-23 PROCEDURE — 85025 COMPLETE CBC W/AUTO DIFF WBC: CPT | Performed by: FAMILY MEDICINE

## 2022-07-23 PROCEDURE — 94640 AIRWAY INHALATION TREATMENT: CPT

## 2022-07-23 PROCEDURE — 63600175 PHARM REV CODE 636 W HCPCS: Performed by: FAMILY MEDICINE

## 2022-07-23 PROCEDURE — 86140 C-REACTIVE PROTEIN: CPT

## 2022-07-23 PROCEDURE — 96361 HYDRATE IV INFUSION ADD-ON: CPT

## 2022-07-23 PROCEDURE — 96365 THER/PROPH/DIAG IV INF INIT: CPT | Mod: 59

## 2022-07-23 PROCEDURE — 87636 SARSCOV2 & INF A&B AMP PRB: CPT | Performed by: STUDENT IN AN ORGANIZED HEALTH CARE EDUCATION/TRAINING PROGRAM

## 2022-07-23 PROCEDURE — 82550 ASSAY OF CK (CPK): CPT | Performed by: FAMILY MEDICINE

## 2022-07-23 PROCEDURE — 81001 URINALYSIS AUTO W/SCOPE: CPT | Performed by: FAMILY MEDICINE

## 2022-07-23 PROCEDURE — G0378 HOSPITAL OBSERVATION PER HR: HCPCS

## 2022-07-23 PROCEDURE — 85610 PROTHROMBIN TIME: CPT | Performed by: FAMILY MEDICINE

## 2022-07-23 PROCEDURE — 87077 CULTURE AEROBIC IDENTIFY: CPT | Performed by: FAMILY MEDICINE

## 2022-07-23 PROCEDURE — 84484 ASSAY OF TROPONIN QUANT: CPT | Performed by: STUDENT IN AN ORGANIZED HEALTH CARE EDUCATION/TRAINING PROGRAM

## 2022-07-23 PROCEDURE — 99291 CRITICAL CARE FIRST HOUR: CPT | Mod: 25

## 2022-07-23 PROCEDURE — 85730 THROMBOPLASTIN TIME PARTIAL: CPT | Performed by: FAMILY MEDICINE

## 2022-07-23 PROCEDURE — 80179 DRUG ASSAY SALICYLATE: CPT

## 2022-07-23 PROCEDURE — 82553 CREATINE MB FRACTION: CPT | Performed by: FAMILY MEDICINE

## 2022-07-23 PROCEDURE — 82728 ASSAY OF FERRITIN: CPT

## 2022-07-23 PROCEDURE — 25000242 PHARM REV CODE 250 ALT 637 W/ HCPCS

## 2022-07-23 PROCEDURE — 36415 COLL VENOUS BLD VENIPUNCTURE: CPT | Performed by: FAMILY MEDICINE

## 2022-07-23 PROCEDURE — 25000003 PHARM REV CODE 250: Performed by: FAMILY MEDICINE

## 2022-07-23 PROCEDURE — 63600175 PHARM REV CODE 636 W HCPCS: Mod: TB

## 2022-07-23 PROCEDURE — 93005 ELECTROCARDIOGRAM TRACING: CPT

## 2022-07-23 PROCEDURE — 96365 THER/PROPH/DIAG IV INF INIT: CPT

## 2022-07-23 PROCEDURE — 83605 ASSAY OF LACTIC ACID: CPT | Performed by: FAMILY MEDICINE

## 2022-07-23 PROCEDURE — 87040 BLOOD CULTURE FOR BACTERIA: CPT | Performed by: FAMILY MEDICINE

## 2022-07-23 PROCEDURE — 83735 ASSAY OF MAGNESIUM: CPT | Performed by: FAMILY MEDICINE

## 2022-07-23 PROCEDURE — 85379 FIBRIN DEGRADATION QUANT: CPT

## 2022-07-23 RX ORDER — DEXAMETHASONE 4 MG/1
4 TABLET ORAL DAILY
Status: DISCONTINUED | OUTPATIENT
Start: 2022-07-24 | End: 2022-07-24 | Stop reason: HOSPADM

## 2022-07-23 RX ORDER — SODIUM CHLORIDE 9 MG/ML
1000 INJECTION, SOLUTION INTRAVENOUS
Status: COMPLETED | OUTPATIENT
Start: 2022-07-23 | End: 2022-07-23

## 2022-07-23 RX ORDER — SODIUM CHLORIDE 0.9 % (FLUSH) 0.9 %
10 SYRINGE (ML) INJECTION
Status: DISCONTINUED | OUTPATIENT
Start: 2022-07-23 | End: 2022-07-24 | Stop reason: HOSPADM

## 2022-07-23 RX ORDER — LATANOPROST 50 UG/ML
1 SOLUTION/ DROPS OPHTHALMIC DAILY
Status: DISCONTINUED | OUTPATIENT
Start: 2022-07-24 | End: 2022-07-24 | Stop reason: HOSPADM

## 2022-07-23 RX ORDER — SULFAMETHOXAZOLE AND TRIMETHOPRIM 800; 160 MG/1; MG/1
1 TABLET ORAL 2 TIMES DAILY
Status: DISCONTINUED | OUTPATIENT
Start: 2022-07-23 | End: 2022-07-24 | Stop reason: HOSPADM

## 2022-07-23 RX ORDER — ATORVASTATIN CALCIUM 20 MG/1
20 TABLET, FILM COATED ORAL DAILY
Status: DISCONTINUED | OUTPATIENT
Start: 2022-07-24 | End: 2022-07-24 | Stop reason: HOSPADM

## 2022-07-23 RX ORDER — NICOTINE 7MG/24HR
1 PATCH, TRANSDERMAL 24 HOURS TRANSDERMAL DAILY
Status: DISCONTINUED | OUTPATIENT
Start: 2022-07-24 | End: 2022-07-24 | Stop reason: HOSPADM

## 2022-07-23 RX ORDER — PANTOPRAZOLE SODIUM 40 MG/1
40 TABLET, DELAYED RELEASE ORAL DAILY
Status: DISCONTINUED | OUTPATIENT
Start: 2022-07-24 | End: 2022-07-24 | Stop reason: HOSPADM

## 2022-07-23 RX ORDER — TALC
6 POWDER (GRAM) TOPICAL NIGHTLY PRN
Status: DISCONTINUED | OUTPATIENT
Start: 2022-07-23 | End: 2022-07-24 | Stop reason: HOSPADM

## 2022-07-23 RX ORDER — FLUTICASONE FUROATE AND VILANTEROL 100; 25 UG/1; UG/1
1 POWDER RESPIRATORY (INHALATION) DAILY
Status: DISCONTINUED | OUTPATIENT
Start: 2022-07-24 | End: 2022-07-24 | Stop reason: HOSPADM

## 2022-07-23 RX ADMIN — REMDESIVIR 200 MG: 100 INJECTION, POWDER, LYOPHILIZED, FOR SOLUTION INTRAVENOUS at 08:07

## 2022-07-23 RX ADMIN — IPRATROPIUM BROMIDE 1 PUFF: 17 AEROSOL, METERED RESPIRATORY (INHALATION) at 06:07

## 2022-07-23 RX ADMIN — SULFAMETHOXAZOLE AND TRIMETHOPRIM 1 TABLET: 800; 160 TABLET ORAL at 09:07

## 2022-07-23 RX ADMIN — SODIUM CHLORIDE, POTASSIUM CHLORIDE, SODIUM LACTATE AND CALCIUM CHLORIDE 2550 ML: 600; 310; 30; 20 INJECTION, SOLUTION INTRAVENOUS at 11:07

## 2022-07-23 RX ADMIN — PIPERACILLIN AND TAZOBACTAM 4.5 G: 4; .5 INJECTION, POWDER, LYOPHILIZED, FOR SOLUTION INTRAVENOUS; PARENTERAL at 12:07

## 2022-07-23 RX ADMIN — SODIUM CHLORIDE 1000 ML: 9 INJECTION, SOLUTION INTRAVENOUS at 12:07

## 2022-07-23 NOTE — ED PROVIDER NOTES
Encounter Date: 7/23/2022       History     Chief Complaint   Patient presents with    Extremity Weakness    Hypotension     Pt c/o weakness. B/P 71/45 Hr 42 in triage.     67 y/o BM presents with weakness and dizziness. Onset 1 day ago.       The history is provided by the patient. No  was used.   General Illness   The current episode started just prior to arrival (weakness). The problem occurs occasionally. The problem has been unchanged. Nothing relieves the symptoms. Nothing aggravates the symptoms. Associated symptoms include diarrhea, nausea and vomiting. Pertinent negatives include no fever, no abdominal pain, no sore throat and no rash.     Review of patient's allergies indicates:  No Known Allergies  Past Medical History:   Diagnosis Date    A-fib     Anticoagulant long-term use     Aortic aneurysm     CHF (congestive heart failure)     Chronic atrial fibrillation     COPD (chronic obstructive pulmonary disease)     Coronary artery disease     HLD (hyperlipidemia)     Hypertension     Mitral regurgitation     PAD (peripheral artery disease)     Primary open angle glaucoma (POAG)      Past Surgical History:   Procedure Laterality Date    Heart Stent N/A      History reviewed. No pertinent family history.  Social History     Tobacco Use    Smoking status: Current Every Day Smoker     Packs/day: 0.25     Years: 40.00     Pack years: 10.00     Types: Cigarettes    Smokeless tobacco: Never Used   Substance Use Topics    Alcohol use: Yes     Alcohol/week: 1.0 standard drink     Types: 1 Cans of beer per week     Comment: socially    Drug use: Yes     Frequency: 1.0 times per week     Types: Marijuana     Comment: once a month     Review of Systems   Constitutional: Negative for fever.   HENT: Negative for sore throat.    Gastrointestinal: Positive for diarrhea, nausea and vomiting. Negative for abdominal pain.   Genitourinary: Negative for dysuria.   Musculoskeletal:  Negative for back pain.   Skin: Negative for rash.   Neurological: Positive for dizziness.   Hematological: Does not bruise/bleed easily.       Physical Exam     Initial Vitals [07/23/22 1043]   BP Pulse Resp Temp SpO2   (!) 71/45 (!) 42 17 97.9 °F (36.6 °C) 98 %      MAP       --         Physical Exam    Nursing note and vitals reviewed.  Constitutional: He appears well-nourished. No distress.   Appears ill    HENT:   Head: Normocephalic and atraumatic.   Eyes: Conjunctivae are normal.   Cardiovascular: Normal rate. An irregularly irregular rhythm present.    Pulmonary/Chest: Breath sounds normal.   Abdominal: Abdomen is soft. Bowel sounds are normal. There is no abdominal tenderness. There is no rebound and no guarding.   Musculoskeletal:         General: Normal range of motion.     Neurological: He is alert and oriented to person, place, and time. He has normal strength. GCS score is 15. GCS eye subscore is 4. GCS verbal subscore is 5. GCS motor subscore is 6.   Skin: Skin is warm and dry.   Psychiatric: He has a normal mood and affect. His behavior is normal. Judgment and thought content normal.         ED Course   Critical Care    Date/Time: 7/23/2022 3:35 PM  Performed by: Ines Angulo MD  Authorized by: Ines Angulo MD   Total critical care time (exclusive of procedural time) : 30 minutes  Critical care time was exclusive of separately billable procedures and treating other patients.  Critical care was necessary to treat or prevent imminent or life-threatening deterioration of the following conditions: circulatory failure, sepsis, metabolic crisis and dehydration.  Critical care was time spent personally by me on the following activities: evaluation of patient's response to treatment, obtaining history from patient or surrogate, ordering and review of laboratory studies, pulse oximetry, review of old charts, development of treatment plan with patient or surrogate, examination of patient, ordering and performing  treatments and interventions, ordering and review of radiographic studies and re-evaluation of patient's condition.        Labs Reviewed   COMPREHENSIVE METABOLIC PANEL - Abnormal; Notable for the following components:       Result Value    Sodium Level 135 (*)     Chloride 96 (*)     Carbon Dioxide 22 (*)     Creatinine 1.87 (*)     Protein Total 8.2 (*)     Globulin 4.2 (*)     Albumin/Globulin Ratio 1.0 (*)     Aspartate Aminotransferase 72 (*)     All other components within normal limits   URINALYSIS, REFLEX TO URINE CULTURE - Abnormal; Notable for the following components:    Color, UA Light-Yellow (*)     Protein, UA Trace (*)     Blood, UA Trace (*)     Leukocyte Esterase,   (*)     WBC, UA 21-50 (*)     WBC Clumps, UA Few (*)     Bacteria, UA Many (*)     Budding Yeast, UA Trace (*)     Squamous Epithelial Cells, UA Few (*)     Mucous, UA Trace (*)     Hyaline Casts, UA 11-20 (*)     All other components within normal limits   CBC WITH DIFFERENTIAL - Abnormal; Notable for the following components:    MCH 31.2 (*)     Platelet 118 (*)     MPV 12.8 (*)     All other components within normal limits   PROTIME-INR - Abnormal; Notable for the following components:    INR 1.65 (*)     All other components within normal limits   LACTIC ACID, PLASMA - Normal   APTT - Normal   MAGNESIUM - Normal   BLOOD CULTURE [facility]   BLOOD CULTURE [facility]   CULTURE, URINE   CBC W/ AUTO DIFFERENTIAL    Narrative:     The following orders were created for panel order CBC auto differential.  Procedure                               Abnormality         Status                     ---------                               -----------         ------                     CBC with Differential[[id number]]        Abnormal            Final result                 Please view results for these tests on the individual orders.   EXTRA TUBES    Narrative:     The following orders were created for panel order EXTRA TUBES.  Procedure                                Abnormality         Status                     ---------                               -----------         ------                     Light Blue Top Hold[[id number]]                              In process                 Light Green Top Hold[[id number]]                             In process                 Light Green Top Hold[[id number]]                             In process                 Lavender Top Hold[[id number]]                                In process                 Gold Top Hold[[id number]]                                    In process                 Pink Top Hold[[id number]]                                    In process                   Please view results for these tests on the individual orders.   LIGHT BLUE TOP HOLD   LIGHT GREEN TOP HOLD   LIGHT GREEN TOP HOLD   LAVENDER TOP HOLD   GOLD TOP HOLD   PINK TOP HOLD   COVID/FLU A&B PCR   TROPONIN I   CK-MB   CK     EKG Readings: (Independently Interpreted)   Rhythm: Atrial Fibrillation. Heart Rate: 87. T Waves Flipped: V6, V5 and AVF. Other Findings: Prolonged QT Interval.     ECG Results          EKG 12-lead (In process)  Result time 07/23/22 11:58:10    In process by Interface, Lab In [facility] (07/23/22 11:58:10)                 Narrative:    Test Reason : R53.1,    Vent. Rate : 087 BPM     Atrial Rate : 067 BPM     P-R Int : 000 ms          QRS Dur : 092 ms      QT Int : 416 ms       P-R-T Axes : 000 070 182 degrees     QTc Int : 500 ms    Atrial fibrillation with premature ventricular or aberrantly conducted  complexes  ST and T wave abnormality, consider inferolateral ischemia  Prolonged QT  Abnormal ECG  No previous ECGs available    Referred By: [email or web address]   SELF           Confirmed By:                             Imaging Results          CT Abdomen Pelvis  Without Contrast (Final result)  Result time 07/23/22 14:52:24    Final result by Sandip Bañuelos MD (07/23/22 14:52:24)                 Impression:      Large dystrophic  calcifications in the prostate    Infrarenal abdominal aortic aneurysm    Otherwise unremarkable      Electronically signed by: Sandip Bañuelos  Date:    07/23/2022  Time:    14:52             Narrative:    EXAMINATION:  CT ABDOMEN PELVIS WITHOUT CONTRAST    CLINICAL HISTORY:  Sepsis;    TECHNIQUE:  Low dose axial images, sagittal and coronal reformations were obtained from the lung bases to the pubic symphysis.  No contrast was administered.  Automatic exposure control is utilized to reduce patient radiation exposure.    COMPARISON:  None    FINDINGS:  The lung bases are clear.    The liver appears normal.  No obvious liver mass or lesion is seen.    Gallbladder appears normal.  No gallstones are seen.    The pancreas appears normal.  No inflammatory changes are seen in the pancreatic region.    The spleen appears normal and adrenal glands appear normal.  No adrenal nodule is seen.    No nephrolithiasis is seen.  No hydronephrosis is seen.  No hydroureter is seen.  No ureteral stone is seen.    No colitis is seen.  No diverticulitis is seen.  No appendicitis is seen.    No free air seen.  No free fluid is seen.  The urinary bladder appears normal.    There is a infrarenal abdominal aortic aneurysm seen with maximum dimension 3.5 cm.    The prostate is diffusely calcified.    Bones show no acute abnormality.                               CT Head Without Contrast (Final result)  Result time 07/23/22 13:05:51    Final result by Sandip Bañuelos MD (07/23/22 13:05:51)                 Impression:      No acute abnormality seen      Electronically signed by: Sandip Bañuelos  Date:    07/23/2022  Time:    13:05             Narrative:    EXAMINATION:  CT HEAD WITHOUT CONTRAST    CLINICAL HISTORY:  dizziness;    TECHNIQUE:  Multiple axial images were obtained from the base of the brain to the vertex without contrast administration.  Sagittal and coronal reconstructions were performed..Automatic exposure control  is utilized to reduce patient radiation exposure.    COMPARISON:  None    FINDINGS:  There is no intracranial mass or lesion seen.  No hemorrhage is seen.  No infarct is seen.  The ventricles and basilar cisterns appear normal.  Brain parenchyma appears grossly unremarkable.    Posterior fossa appears normal.  The calvarium is intact.  The paranasal sinuses appear grossly unremarkable.                               X-Ray Chest AP Portable (Final result)  Result time 07/23/22 11:45:23    Final result by Sandip Bañuelos MD (07/23/22 11:45:23)                 Impression:      No acute abnormality.      Electronically signed by: Sandip Bañuelos  Date:    07/23/2022  Time:    11:45             Narrative:    EXAMINATION:  XR CHEST AP PORTABLE    CLINICAL HISTORY:  Sepsis;    TECHNIQUE:  Single frontal view of the chest was performed.    COMPARISON:  12/06/2021    FINDINGS:  The lungs are clear, with normal appearance of pulmonary vasculature and no pleural effusion or pneumothorax.    The cardiac silhouette is normal in size. The hilar and mediastinal contours are unremarkable.    Bones are intact.                                 Medications   lactated ringers bolus 2,550 mL (0 mL/kg × 85 kg Intravenous Stopped 7/23/22 1359)   piperacillin-tazobactam (ZOSYN) 4.5 g in sodium chloride 0.9 % 100 mL IVPB (MB+) (0 g Intravenous Stopped 7/23/22 1255)   0.9%  NaCl infusion ( Intravenous Restarted 7/23/22 1400)                 ED Course as of 07/23/22 1541   Sat Jul 23, 2022   1100 Pt presents with N/V/D, weakness. Pt hypotensive upon arrival. Sepsis protocol initiated in ED. Will contd to monitor [AK]   1230 1230- Pt received first 1000ml of NS bolus. At this time will perform reexamination   Focus physical exam:   General:  NAD  Cv: irregular irregular rhythm, regular rate   Lungs: CTAB   Neuro- no acute neuro deficits, GCS 15   Skin: cap refil < 2 sec , warm, dry, not cool, not mottled.      Labs Reviewed   Lactic  acid- 2.1  [AK]   1300 Reviewed labs.   Pt with UTI- source of infection  [AK]   1430 1430  At this time- pt has received 30cc/kg -   VS reviewed.   Severe sepsis reexamination    General:  NAD  Cv: irregular irregular rhythm, regular rate   Lungs: CTAB   Neuro- no acute neuro deficits, GCS 15   Skin: cap refil < 2 sec , warm, dry, not cool, not mottled.       [AK]   1530 Discussed all labs and imaging with patient.  Pt agrees to be admitted.   IM on call has been consulted for admission [AK]      ED Course User Index  [AK] Ines Angulo MD             Clinical Impression:   Final diagnoses:  [R53.1] Weakness  [A41.9, R65.20] Severe sepsis (Primary)          ED Disposition Condition    Admit               Ines Angulo MD  07/23/22 1478

## 2022-07-23 NOTE — H&P
[facility] Internal Medicine History and Physical     Date of Admit: 7/23/2022    Chief Complaint     Weakness, headaches, diarrhea    Subjective:      History of Present Illness:  Ran Reyes Jr. is a 66 y.o. male who  has a past medical history of A-fib, Anticoagulant long-term use, Aortic aneurysm, CHF (congestive heart failure), Chronic atrial fibrillation, COPD (chronic obstructive pulmonary disease), Coronary artery disease, HLD (hyperlipidemia), Hypertension, Mitral regurgitation, PAD (peripheral artery disease), and Primary open angle glaucoma (POAG).. The patient presented to [facility] ED on 7/23/2022 with a primary complaint of weakness, headache, and diarrhea for the past 2 days. He states that on 7/21 he saw his cardiologist at [facility] (unable to remember her name) and at that time his blood pressure was low, no interventions taken at that time. He states that since the evening of 7/21 after his appointment he has been feeling lightheaded but did not ever pass out, has had 4-5 episodes of diarrhea daily. He denies melena or hematochezia. He denies any recent sick contacts, shortness of breath, chest pain, fevers, chills, vomiting, nausea. He has never been vaccinated against or infected with covid-19.   In the ED BP was noted to be 71/45. There was concern for UTI based on UA, pt asymptomatic.  Patient denies hematuria, dysuria, increased frequency. He states that he has actually been urinating less than normal for the past few days. He has been compliant with all of his medications including blood pressure meds.    Past Medical History:  Past Medical History:   Diagnosis Date    A-fib     Anticoagulant long-term use     Aortic aneurysm     CHF (congestive heart failure)     Chronic atrial fibrillation     COPD (chronic obstructive pulmonary disease)     Coronary artery disease     HLD (hyperlipidemia)     Hypertension     Mitral regurgitation     PAD (peripheral artery disease)     Primary open angle  glaucoma (POAG)        Past Surgical History:  Past Surgical History:   Procedure Laterality Date    Heart Stent N/A        Allergies:  Review of patient's allergies indicates:  No Known Allergies    Home Medications:  Prior to Admission medications    Medication Sig Start Date End Date Taking? Authorizing Provider   ADVAIR HFA 45-21 mcg/actuation HFAA inhaler INHALE 2 PUFFS TWICE DAILY RINSE MOUTH & THROAT AFTER EACH USE 1/4/22   Historical Provider   HYDROcodone-acetaminophen (NORCO) 5-325 mg per tablet Take 1 tablet by mouth every 4 (four) hours as needed for Pain. 5/9/22   MILES Panchal   ipratropium (ATROVENT HFA) 17 mcg/actuation inhaler Inhale into the lungs. 11/17/21   Historical Provider   isosorbide mononitrate (IMDUR) 30 MG 24 hr tablet Take 30 mg by mouth every morning. 3/2/22   Historical Provider   latanoprost (XALATAN) 0.005 % ophthalmic solution Place 1 drop into both eyes once daily. 6/1/22 6/1/23  Liban Trejo MD   lisinopriL (PRINIVIL,ZESTRIL) 5 MG tablet Take 5 mg by mouth. 12/30/21   Historical Provider   metoprolol succinate (TOPROL-XL) 25 MG 24 hr tablet Take 12.5 mg by mouth once daily. 3/2/22   Historical Provider   nicotine (NICODERM CQ) 7 mg/24 hr Place 1 patch onto the skin once daily. 6/2/22   Cecy Schneider MD   nicotine polacrilex 4 MG Lozg Take 1 lozenge (4 mg total) by mouth as needed (withdrawl from nicotine). 6/2/22   Cecy Schneider MD   rivaroxaban (XARELTO) 20 mg Tab Take 20 mg by mouth. 12/30/21   Historical Provider   simvastatin (ZOCOR) 40 MG tablet Take 40 mg by mouth. 12/30/21   Historical Provider   spironolactone (ALDACTONE) 25 MG tablet Take 25 mg by mouth. 2/2/22   Historical Provider   torsemide (DEMADEX) 20 MG Tab Take 20 mg by mouth once daily. 2/16/22   Historical Provider       Family History:  History reviewed. No pertinent family history.    Social History:  Social History     Tobacco Use    Smoking status: Current Every Day Smoker     Packs/day: 0.25  "    Years: 40.00     Pack years: 10.00     Types: Cigarettes    Smokeless tobacco: Never Used   Substance Use Topics    Alcohol use: Yes     Alcohol/week: 1.0 standard drink     Types: 1 Cans of beer per week     Comment: socially    Drug use: Yes     Frequency: 1.0 times per week     Types: Marijuana     Comment: once a month       Review of Systems:  Pertinent positives and negatives listed in HPI. All other systems are reviewed and are negative.       Objective:   Last 24 Hour Vital Signs:  Vitals  BP: 110/76  Temp: 97.9 °F (36.6 °C)  Pulse: 76  Resp: 19  SpO2: 98 %  Height: 5' 10" (177.8 cm)  Weight: 85 kg (187 lb 6.3 oz)    Physical Examination:  General: Patient resting comfortably, in no acute distress   Eye: pupils equal, EOMI, clear conjunctiva, eyelids normal  HENT: Head-normocephalic and atraumatic  Neck: full range of motion, no thyromegaly or lymphadenopathy, trachea midline, supple  Respiratory: clear to auscultation bilaterally without wheezes, rales, rhonchi  Cardiovascular: regular rate and rhythm without murmurs.  No gallops or rubs. No edema  Gastrointestinal: soft, non-tender, non-distended with normal bowel sounds, without masses to palpation  Genitourinary: no CVA tenderness to palpation  Musculoskeletal: full range of motion of all extremities/spine without limitation or discomfort  Integumentary: no rashes or skin lesions present  Neurologic: no signs of peripheral neurological deficit, motor/sensory function intact  Psychiatric:  alert and oriented, cognitive function intact, cooperative with exam, good eye contact        Laboratory:  Most Recent Data:  CMP:  Recent Labs   Lab 07/23/22  1101   CHLORIDE 96*   CO2 22*   BUN 18.2   CREATININE 1.87*   GLUCOSE 110   ALBUMIN 4.0   AST 72*   ALT 26   ALKPHOS 71     CBC:  Recent Labs   Lab 07/23/22  1101   WBC 5.8   ABSNEUTRO 3.2   RBC 4.78   HGB 14.9   HCT 44.4   MCV 92.9   RDW 15.4     Coags:   Lab Results   Component Value Date    INR 1.65 (H) " 07/23/2022    PROTIME 19.1 07/23/2022    PTT 38.6 07/23/2022     FLP:   Lab Results   Component Value Date    CHOL 104 11/16/2021    HDL 41 11/16/2021    TRIG 72 11/16/2021     DM:   Lab Results   Component Value Date    HGBA1C 4.7 11/15/2021    HGBA1C 5.7 10/07/2020    HGBA1C 5.7 01/31/2020    CREATININE 1.87 (H) 07/23/2022     Thyroid:   Lab Results   Component Value Date    TSH 1.5498 11/16/2021     Anemia:   Lab Results   Component Value Date    IRON 42 (L) 11/17/2021    TIBC 313 11/17/2021    FERRITIN 124.45 11/17/2021    SPHVBTFC23 1,096 (H) 10/07/2020    FOLATE 9.7 10/07/2020     Cardiac:   Lab Results   Component Value Date    TROPONINI 0.029 07/23/2022    .3 (H) 12/06/2021     Urinalysis:   Lab Results   Component Value Date    COLORU COLORLESS 12/06/2021    PHUA 6.5 07/23/2022    NITRITE Negative 12/06/2021    KETONESU Negative 12/06/2021    UROBILINOGEN Normal 07/23/2022    WBCUA 21-50 (A) 07/23/2022       Trended Cardiac Data:  Recent Labs   Lab 07/23/22  1543   TROPONINI 0.029         Radiology:  Imaging Results          CT Abdomen Pelvis  Without Contrast (Final result)  Result time 07/23/22 14:52:24    Final result by Sandip Bañuelos MD (07/23/22 14:52:24)                 Impression:      Large dystrophic calcifications in the prostate    Infrarenal abdominal aortic aneurysm    Otherwise unremarkable      Electronically signed by: Sandip Bañuelos  Date:    07/23/2022  Time:    14:52             Narrative:    EXAMINATION:  CT ABDOMEN PELVIS WITHOUT CONTRAST    CLINICAL HISTORY:  Sepsis;    TECHNIQUE:  Low dose axial images, sagittal and coronal reformations were obtained from the lung bases to the pubic symphysis.  No contrast was administered.  Automatic exposure control is utilized to reduce patient radiation exposure.    COMPARISON:  None    FINDINGS:  The lung bases are clear.    The liver appears normal.  No obvious liver mass or lesion is seen.    Gallbladder appears normal.  No  gallstones are seen.    The pancreas appears normal.  No inflammatory changes are seen in the pancreatic region.    The spleen appears normal and adrenal glands appear normal.  No adrenal nodule is seen.    No nephrolithiasis is seen.  No hydronephrosis is seen.  No hydroureter is seen.  No ureteral stone is seen.    No colitis is seen.  No diverticulitis is seen.  No appendicitis is seen.    No free air seen.  No free fluid is seen.  The urinary bladder appears normal.    There is a infrarenal abdominal aortic aneurysm seen with maximum dimension 3.5 cm.    The prostate is diffusely calcified.    Bones show no acute abnormality.                               CT Head Without Contrast (Final result)  Result time 07/23/22 13:05:51    Final result by Sandip Bañuelos MD (07/23/22 13:05:51)                 Impression:      No acute abnormality seen      Electronically signed by: Sandip Bañuelos  Date:    07/23/2022  Time:    13:05             Narrative:    EXAMINATION:  CT HEAD WITHOUT CONTRAST    CLINICAL HISTORY:  dizziness;    TECHNIQUE:  Multiple axial images were obtained from the base of the brain to the vertex without contrast administration.  Sagittal and coronal reconstructions were performed..Automatic exposure control is utilized to reduce patient radiation exposure.    COMPARISON:  None    FINDINGS:  There is no intracranial mass or lesion seen.  No hemorrhage is seen.  No infarct is seen.  The ventricles and basilar cisterns appear normal.  Brain parenchyma appears grossly unremarkable.    Posterior fossa appears normal.  The calvarium is intact.  The paranasal sinuses appear grossly unremarkable.                               X-Ray Chest AP Portable (Final result)  Result time 07/23/22 11:45:23    Final result by Sandip Bañuelos MD (07/23/22 11:45:23)                 Impression:      No acute abnormality.      Electronically signed by: Sandip Bañuelos  Date:    07/23/2022  Time:    11:45              Narrative:    EXAMINATION:  XR CHEST AP PORTABLE    CLINICAL HISTORY:  Sepsis;    TECHNIQUE:  Single frontal view of the chest was performed.    COMPARISON:  12/06/2021    FINDINGS:  The lungs are clear, with normal appearance of pulmonary vasculature and no pleural effusion or pneumothorax.    The cardiac silhouette is normal in size. The hilar and mediastinal contours are unremarkable.    Bones are intact.                                     Assessment & Plan:     Hypotension   - holding home hypertensives at this time  - pt states has not been eating/ drinking much at home due to decreased appetite  - since admission pt has been normotensive, will continue to monitor   - holding fluids for now, hx of HF    Bradycardia  - HR in the 40s on admission  - holding home metoprolol  - continue to monitor vitals closely    Anion gap acidosis  - on admission AG 17  - lactic acid 2.1, wnl  - Cr elevated at 1.87. baseline is around 1.0  - salicylate level ordered, will follow    COVID+  - symptoms include headaches, body aches, diarrhea, but no chest pain or shortness of breath  - CXR show no acute abnormalities  - D dimer WNL  - ferritin 836, CRP 8.80  - on xarelto for anticoagulation (for a-fib)  - on remdesivir while IP  - O2 sat 99% on RA.  - encouraged pt to use incentive spirometer   - will monitor oxygen requirement and consider steroids if pt starts to desat     Congestive heart failure, recovered EF  - LVEF 40% 1/5/22 (recovered from 20-30%)  - euvolemic on exam  - ADRIENNE 1/5/22 revealed severe MR  - at home takes torsemide 20 qday, lisinopril 5mg, metoprolol 25mg, and spiranolactone 25mg. Holding all BP meds while inpatient due to current hypotension.  - follows with cardiologist     Severe Mitral Regurgitation  -TTE 11/16/21 mod-severe MR  -ADRIENNE 1/2022 revealed severe EF with regurgitant volume of 84cc- MR is functional  - has appt with cardiologist in August 2022 for mitraclip    UTI  - UA concerning for  UTI. Urine cultures pending.  - pt is asymptomatic  - bactrim 3 days given    Hx of CAD  - pt reports having stents in the past (about 10 years ago), no records available per cardiology  - CAD is quiescent. Troponin negative on admission. No chest pain currently.   - Continue aspirin, atorvastatin  - hold metoprolol     Afib, chronic   -In afib today, history of a-fib  -CHADsVASC 3  -Continue Xarelto    COPD  - smokes half PPD, previously 2 packs a day for 30-40 years  - not in acute exacerbation at this time  - on ipratropium inhaler at home, will continue IP  - was previously  followed by pulm clinic but d/c'd    HLD  -At goal, LDL 49  - home meds include simvastatin 40mg, will receive atorvastatin 20mg while IP    Suspicious lung mass on prior CT (resolved)  - Cleared by pulmonology 3/22      CODE STATUS: Full Code  Access: Peripheral  Antibiotics: none  Diet: Cardiac  DVT Prophylaxis: xarelto  GI Prophylaxis: proton pump inhibitor per orders  Fluids: none      Disposition: day 0 of admission for hypotension and lightheadedness. Holding home BP meds for now and monitoring vitals closely.     Tiffanie Chase, DO  [facility] Internal Medicine  HO-1

## 2022-07-24 VITALS
HEIGHT: 70 IN | OXYGEN SATURATION: 97 % | RESPIRATION RATE: 18 BRPM | TEMPERATURE: 99 F | WEIGHT: 187.38 LBS | BODY MASS INDEX: 26.82 KG/M2 | SYSTOLIC BLOOD PRESSURE: 122 MMHG | DIASTOLIC BLOOD PRESSURE: 78 MMHG | HEART RATE: 95 BPM

## 2022-07-24 DIAGNOSIS — U07.1 COVID-19 VIRUS DETECTED: ICD-10-CM

## 2022-07-24 LAB
ALBUMIN SERPL-MCNC: 3.3 GM/DL (ref 3.4–4.8)
ALBUMIN/GLOB SERPL: 1.1 RATIO (ref 1.1–2)
ALP SERPL-CCNC: 60 UNIT/L (ref 40–150)
ALT SERPL-CCNC: 18 UNIT/L (ref 0–55)
AST SERPL-CCNC: 38 UNIT/L (ref 5–34)
BASOPHILS # BLD AUTO: 0.02 X10(3)/MCL (ref 0–0.2)
BASOPHILS NFR BLD AUTO: 0.5 %
BILIRUBIN DIRECT+TOT PNL SERPL-MCNC: 0.6 MG/DL
BUN SERPL-MCNC: 15.9 MG/DL (ref 8.4–25.7)
CALCIUM SERPL-MCNC: 8.4 MG/DL (ref 8.8–10)
CHLORIDE SERPL-SCNC: 101 MMOL/L (ref 98–107)
CO2 SERPL-SCNC: 26 MMOL/L (ref 23–31)
CREAT SERPL-MCNC: 1.12 MG/DL (ref 0.73–1.18)
CRP SERPL-MCNC: 8.2 MG/L
EOSINOPHIL # BLD AUTO: 0.01 X10(3)/MCL (ref 0–0.9)
EOSINOPHIL NFR BLD AUTO: 0.2 %
ERYTHROCYTE [DISTWIDTH] IN BLOOD BY AUTOMATED COUNT: 15.2 % (ref 11.5–17)
FERRITIN SERPL-MCNC: 681.1 NG/ML (ref 21.81–274.66)
GLOBULIN SER-MCNC: 3 GM/DL (ref 2.4–3.5)
GLUCOSE SERPL-MCNC: 83 MG/DL (ref 82–115)
HCT VFR BLD AUTO: 42 % (ref 42–52)
HGB BLD-MCNC: 13.5 GM/DL (ref 14–18)
IMM GRANULOCYTES # BLD AUTO: 0.01 X10(3)/MCL (ref 0–0.04)
IMM GRANULOCYTES NFR BLD AUTO: 0.2 %
LYMPHOCYTES # BLD AUTO: 1.69 X10(3)/MCL (ref 0.6–4.6)
LYMPHOCYTES NFR BLD AUTO: 38.9 %
MCH RBC QN AUTO: 30.3 PG (ref 27–31)
MCHC RBC AUTO-ENTMCNC: 32.1 MG/DL (ref 33–36)
MCV RBC AUTO: 94.2 FL (ref 80–94)
MONOCYTES # BLD AUTO: 0.69 X10(3)/MCL (ref 0.1–1.3)
MONOCYTES NFR BLD AUTO: 15.9 %
NEUTROPHILS # BLD AUTO: 1.9 X10(3)/MCL (ref 2.1–9.2)
NEUTROPHILS NFR BLD AUTO: 44.3 %
NRBC BLD AUTO-RTO: 0 %
PLATELET # BLD AUTO: 101 X10(3)/MCL (ref 130–400)
PMV BLD AUTO: 12.1 FL (ref 7.4–10.4)
POTASSIUM SERPL-SCNC: 3.9 MMOL/L (ref 3.5–5.1)
PROT SERPL-MCNC: 6.3 GM/DL (ref 5.8–7.6)
RBC # BLD AUTO: 4.46 X10(6)/MCL (ref 4.7–6.1)
SODIUM SERPL-SCNC: 136 MMOL/L (ref 136–145)
WBC # SPEC AUTO: 4.4 X10(3)/MCL (ref 4.5–11.5)

## 2022-07-24 PROCEDURE — 94640 AIRWAY INHALATION TREATMENT: CPT

## 2022-07-24 PROCEDURE — 63600175 PHARM REV CODE 636 W HCPCS

## 2022-07-24 PROCEDURE — 86140 C-REACTIVE PROTEIN: CPT

## 2022-07-24 PROCEDURE — G0378 HOSPITAL OBSERVATION PER HR: HCPCS

## 2022-07-24 PROCEDURE — 94761 N-INVAS EAR/PLS OXIMETRY MLT: CPT

## 2022-07-24 PROCEDURE — S4991 NICOTINE PATCH NONLEGEND: HCPCS

## 2022-07-24 PROCEDURE — 36415 COLL VENOUS BLD VENIPUNCTURE: CPT

## 2022-07-24 PROCEDURE — 25000003 PHARM REV CODE 250

## 2022-07-24 PROCEDURE — 82728 ASSAY OF FERRITIN: CPT

## 2022-07-24 PROCEDURE — 85025 COMPLETE CBC W/AUTO DIFF WBC: CPT

## 2022-07-24 PROCEDURE — 25000242 PHARM REV CODE 250 ALT 637 W/ HCPCS: Performed by: STUDENT IN AN ORGANIZED HEALTH CARE EDUCATION/TRAINING PROGRAM

## 2022-07-24 PROCEDURE — 80053 COMPREHEN METABOLIC PANEL: CPT

## 2022-07-24 RX ORDER — METHYLPREDNISOLONE 4 MG/1
TABLET ORAL
Qty: 21 EACH | Refills: 0 | Status: SHIPPED | OUTPATIENT
Start: 2022-07-24 | End: 2022-07-24 | Stop reason: SDUPTHER

## 2022-07-24 RX ORDER — METHYLPREDNISOLONE 4 MG/1
TABLET ORAL
Qty: 21 EACH | Refills: 0 | Status: SHIPPED | OUTPATIENT
Start: 2022-07-24 | End: 2022-08-14

## 2022-07-24 RX ADMIN — ATORVASTATIN CALCIUM 20 MG: 20 TABLET, FILM COATED ORAL at 08:07

## 2022-07-24 RX ADMIN — DEXAMETHASONE 4 MG: 4 TABLET ORAL at 08:07

## 2022-07-24 RX ADMIN — LATANOPROST 1 DROP: 50 SOLUTION OPHTHALMIC at 08:07

## 2022-07-24 RX ADMIN — PANTOPRAZOLE SODIUM 40 MG: 40 TABLET, DELAYED RELEASE ORAL at 08:07

## 2022-07-24 RX ADMIN — FLUTICASONE FUROATE AND VILANTEROL TRIFENATATE 1 PUFF: 100; 25 POWDER RESPIRATORY (INHALATION) at 07:07

## 2022-07-24 RX ADMIN — IPRATROPIUM BROMIDE 1 PUFF: 17 AEROSOL, METERED RESPIRATORY (INHALATION) at 07:07

## 2022-07-24 RX ADMIN — SULFAMETHOXAZOLE AND TRIMETHOPRIM 1 TABLET: 800; 160 TABLET ORAL at 08:07

## 2022-07-24 RX ADMIN — NICOTINE 1 PATCH: 7 PATCH, EXTENDED RELEASE TRANSDERMAL at 08:07

## 2022-07-24 NOTE — NURSING
Assessed patient after telemetry monitor personnel called and reported that the patient had a 3 beat run of V-tach. The patient is AAOx4. States that he was lying down at the time. /79 72 99% 20. Spoke with Dr. Chase and informed her of the reporting and the patient's current state.

## 2022-07-24 NOTE — PLAN OF CARE
Ran Reyes Jr. is a 66 y.o. male who  has a past medical history of A-fib, Anticoagulant long-term use, Aortic aneurysm, CHF (congestive heart failure), Chronic atrial fibrillation, COPD (chronic obstructive pulmonary disease), Coronary artery disease, HLD (hyperlipidemia), Hypertension, Mitral regurgitation, PAD (peripheral artery disease), and Primary open angle glaucoma (POAG).. The patient presented to [facility] ED on 7/23/2022 with a primary complaint of weakness, headache, and diarrhea for the past 2 days. He states that on 7/21 he saw his cardiologist at [facility] (unable to remember her name) and at that time his blood pressure was low, no interventions taken at that time. He states that since the evening of 7/21 after his appointment he has been feeling lightheaded but did not ever pass out, has had 4-5 episodes of diarrhea daily. He denies melena or hematochezia. He denies any recent sick contacts, shortness of breath, chest pain, fevers, chills, vomiting, nausea. He has never been vaccinated against or infected with covid-19.   In the ED BP was noted to be 71/45. There was concern for UTI based on UA, pt asymptomatic.  Patient denies hematuria, dysuria, increased frequency. He states that he has actually been urinating less than normal for the past few days. He has been compliant with all of his medications including blood pressure meds.  Physical Exam     General: NAD   HEENT: Atraumatic, Normocephalic. No scleral icterus.   Neck: supple. No JVD   Pulm: CTAB. No wheezes. No crackles. Symmetrical chest expansion.  Cardio: Regular rate. Regular rhythm. No murmurs/gallops.   Abd: +BS, soft, non-distended, non-tender to palpation.   Extremities: good 2+ pulses in all extremities. No LE edema.   MSK: No obvious deformities. Moves all extremities purposely.   Neuro: Alert, responsive. Oriented x 3. Responds to questions appropriately. Follows simple commands.        Hypotension   - holding home hypertensives at  this time  - pt states has not been eating/ drinking much at home due to decreased appetite  - since admission pt has been normotensive,received 3.5 L IVF,will hold off on further IVF hydration.     Bradycardia  - HR in the 40s on admission  - holding home metoprolol  - continue to monitor vitals closely     Anion gap acidosis  - on admission AG 17  - lactic acid 2.1, wnl  - Cr elevated at 1.87. baseline is around 1.0  - salicylate level ordered, will follow     COVID+  - symptoms include headaches, body aches, diarrhea, but no chest pain or shortness of breath  - CXR show no acute abnormalities  - D dimer WNL  - ferritin 836, CRP 8.80  - on xarelto for anticoagulation (for a-fib)  - on remdesivir while IP  - O2 sat 99% on RA.  - encouraged pt to use incentive spirometer   - will monitor oxygen requirement and consider steroids if pt starts to desat      Congestive heart failure, recovered EF  - LVEF 40% 1/5/22 (recovered from 20-30%)  - euvolemic on exam  - ADRIENNE 1/5/22 revealed severe MR  - at home takes torsemide 20 qday, lisinopril 5mg, metoprolol 25mg, and spiranolactone 25mg. Holding all BP meds while inpatient due to current hypotension.  - follows with cardiologist     Severe Mitral Regurgitation  -TTE 11/16/21 mod-severe MR  -ADRIENNE 1/2022 revealed severe EF with regurgitant volume of 84cc- MR is functional  - has appt with cardiologist in August 2022 for mitraclip     UTI  - UA concerning for UTI. Urine cultures pending.  - pt is asymptomatic  - bactrim 3 days given    Hx of CAD  - pt reports having stents in the past (about 10 years ago), no records available per cardiology  - CAD is quiescent. Troponin negative on admission. No chest pain currently.   - Continue aspirin, atorvastatin  - hold metoprolol     Afib, chronic   -In afib today, history of a-fib  -CHADsVASC 3  -Continue Xarelto     COPD  - smokes half PPD, previously 2 packs a day for 30-40 years  - not in acute exacerbation at this time  - on  ipratropium inhaler at home, will continue IP  - was previously  followed by pulm clinic but d/c'd    HLD  -At goal, LDL 49  - home meds include simvastatin 40mg, will receive atorvastatin 20mg while IP    Suspicious lung mass on prior CT (resolved)  - Cleared by pulmonology 3/22        CODE STATUS: Full Code  Access: Peripheral  Antibiotics: none  Diet: Cardiac  DVT Prophylaxis: xarelto  GI Prophylaxis: proton pump inhibitor per orders  Fluids: none       Disposition: day 0 of admission for hypotension and lightheadedness. Holding home BP meds for now and monitoring vitals closely.     Melba Ojeda MD  [employer] IM Resident PGY-3

## 2022-07-24 NOTE — DISCHARGE SUMMARY
[facility] Discharge Summary    Admitting Physician: Max Boyer MD  Attending Physician: Max Boyer MD  Date of Admit: 7/23/2022  Date of Discharge: 7/24/2022    Condition: Good  Outcome: Condition has improved and patient is now ready for discharge.  DISPOSITION: Home or Self Care        Discharge Diagnoses:     Patient Active Problem List   Diagnosis    Primary open angle glaucoma (POAG) of right eye, moderate stage    Combined forms of age-related cataract of left eye    Arteriosclerosis of coronary artery    Chronic atrial fibrillation    Congestive heart failure    Dyslipidemia    Hypertension    Tobacco user    PVD (peripheral vascular disease)    VHD (valvular heart disease)    COVID-19       Principal Problem:  COVID-19    Consultants and Procedures:     Consultants:  IP CONSULT TO INTERNAL MEDICINE    Procedures:   * No surgery found *      Brief Admission History:      Ran Reyes Jr. is a 66 y.o. male who  has a past medical history of A-fib, Anticoagulant long-term use, Aortic aneurysm, CHF (congestive heart failure), Chronic atrial fibrillation, COPD (chronic obstructive pulmonary disease), Coronary artery disease, HLD (hyperlipidemia), Hypertension, Mitral regurgitation, PAD (peripheral artery disease), and Primary open angle glaucoma (POAG).. The patient presented to [facility] ED on 7/23/2022 with a primary complaint of weakness, headache, and diarrhea for the past 2 days. He states that on 7/21 he saw his cardiologist at [facility] (unable to remember her name) and at that time his blood pressure was low, no interventions taken at that time. He states that since the evening of 7/21 after his appointment he has been feeling lightheaded but did not ever pass out, has had 4-5 episodes of diarrhea daily. He denies melena or hematochezia. He denies any recent sick contacts, shortness of breath, chest pain, fevers, chills, vomiting, nausea. He has never been vaccinated  "against or infected with covid-19.   In the ED BP was noted to be 71/45. There was concern for UTI based on UA, pt asymptomatic.  Patient denies hematuria, dysuria, increased frequency. He states that he has actually been urinating less than normal for the past few days. He has been compliant with all of his medications including blood pressure meds.    Hospital Course with Pertinent Findings:      On the day of discharge he no longer has anion gap, Creatinine is improved. Diarrhea and coughing due to covid. Denies chest pain and shortness of breath. Satting appropriately on room air. Received remdesivir and decadron while inpatient, discharged on medrol dose pack. Did not prescribe paxlovid because he is on several medications that may interact (xarelto, statin). Received 3.5 L IVF since admission and is normotensive now. Blood pressure is much better after holding BP meds. Will consider adjusting regimen at post wards follow up. Referral for cardiology sent for f/u of a-fib and CHF and severe mitral regurg. He does state he has an appt with [facility] in august for mitraclip.     Discharge physical exam:  Vitals  BP: 131/81  Temp: 98.8 °F (37.1 °C)  Temp src: Oral  Pulse: 95  Resp: 18  SpO2: 97 %  Height: 5' 10" (177.8 cm)  Weight: 85 kg (187 lb 6.3 oz)    General: Patient resting comfortably, in no acute distress   Eye: pupils equal, EOMI, clear conjunctiva, eyelids normal  HENT: Head-normocephalic and atraumatic  Neck: full range of motion, no thyromegaly or lymphadenopathy, trachea midline, supple, no palpable thyroid nodules  Respiratory: clear to auscultation bilaterally without wheezes, rales, rhonchi  Cardiovascular: regular rate and rhythm without murmurs.  No gallops or rubs no JVD.  Capillary refill within normal limits.  Gastrointestinal: soft, non-tender, non-distended with normal bowel sounds, without masses to palpation  Genitourinary: no CVA tenderness to palpation  Musculoskeletal: full range of motion of " all extremities/spine without limitation or discomfort  Integumentary: no rashes or skin lesions present  Neurologic: no signs of peripheral neurological deficit, motor/sensory function intact  Psychiatric:  alert and oriented, cognitive function intact, cooperative with exam, good eye contact      TIME SPENT ON DISCHARGE: 35 minutes    Discharge Medications:         Medication List      START taking these medications    methylPREDNISolone 4 mg tablet  Commonly known as: MEDROL DOSEPACK  use as directed        CONTINUE taking these medications    ADVAIR HFA 45-21 mcg/actuation Hfaa inhaler  Generic drug: fluticasone propion-salmeterol 45-21 mcg/dose     ATROVENT HFA 17 mcg/actuation inhaler  Generic drug: ipratropium     HYDROcodone-acetaminophen 5-325 mg per tablet  Commonly known as: NORCO  Take 1 tablet by mouth every 4 (four) hours as needed for Pain.     isosorbide mononitrate 30 MG 24 hr tablet  Commonly known as: IMDUR     latanoprost 0.005 % ophthalmic solution  Commonly known as: XALATAN  Place 1 drop into both eyes once daily.     lisinopriL 5 MG tablet  Commonly known as: PRINIVIL,ZESTRIL     metoprolol succinate 25 MG 24 hr tablet  Commonly known as: TOPROL-XL     nicotine 7 mg/24 hr  Commonly known as: NICODERM CQ  Place 1 patch onto the skin once daily.     nicotine polacrilex 4 MG Lozg  Take 1 lozenge (4 mg total) by mouth as needed (withdrawl from nicotine).     rivaroxaban 20 mg Tab  Commonly known as: XARELTO     simvastatin 40 MG tablet  Commonly known as: ZOCOR     spironolactone 25 MG tablet  Commonly known as: ALDACTONE     torsemide 20 MG Tab  Commonly known as: DEMADEX           Where to Get Your Medications      You can get these medications from any pharmacy    Bring a paper prescription for each of these medications  methylPREDNISolone 4 mg tablet         Discharge Instructions:         Ran Eric Randhawa is being discharged Home or Self Care.    No discharge procedures on file.      Follow-Up Appointments:   Follow-up Information     [place] INTERNAL MEDICINE Follow up in 1 week(s).           [facility]. Schedule an appointment as soon as possible for a visit in 1 month(s).    Specialty: Cardiology  Contact information:  [address] [facility] [address] [phone]                         To address at follow-up:  Address hypertensive regimen, pt on several bp meds was hypotensive on admit, normotensive after receiving fluids and holding bp meds    At this time, Ran Reyes JrLogan is determined to have maximized benefits of IP hospitalization. he is discharged in stable condition with OP f/u recommendations and instructions. All questions answered, and patient verbalized agreement with the POC. They were given return precautions prior to d/c including symptoms that should prompt return to ED or to call PCP. Total time spent of DC of 35 minutes.       Tiffanie hCase DO  [facility] Internal Medicine  HO-1

## 2022-07-25 DIAGNOSIS — I50.9 CONGESTIVE HEART FAILURE: Primary | ICD-10-CM

## 2022-07-27 LAB — BACTERIA UR CULT: ABNORMAL

## 2022-07-28 LAB
BACTERIA BLD CULT: NORMAL
BACTERIA BLD CULT: NORMAL

## 2022-09-14 ENCOUNTER — HOSPITAL ENCOUNTER (EMERGENCY)
Facility: HOSPITAL | Age: 67
Discharge: HOME | End: 2022-09-14
Attending: EMERGENCY MEDICINE
Payer: MEDICARE

## 2022-09-14 VITALS
WEIGHT: 188 LBS | TEMPERATURE: 98 F | SYSTOLIC BLOOD PRESSURE: 130 MMHG | BODY MASS INDEX: 26.92 KG/M2 | DIASTOLIC BLOOD PRESSURE: 80 MMHG | OXYGEN SATURATION: 97 % | RESPIRATION RATE: 18 BRPM | HEIGHT: 70 IN | HEART RATE: 80 BPM

## 2022-09-14 DIAGNOSIS — N12 PYELONEPHRITIS: Primary | ICD-10-CM

## 2022-09-14 LAB
APPEARANCE UR: ABNORMAL
BACTERIA #/AREA URNS AUTO: ABNORMAL /HPF
BILIRUB UR QL STRIP.AUTO: NEGATIVE MG/DL
COLOR UR AUTO: YELLOW
GLUCOSE UR QL STRIP.AUTO: NEGATIVE MG/DL
KETONES UR QL STRIP.AUTO: 5 MG/DL
LEUKOCYTE ESTERASE UR QL STRIP.AUTO: 500 UNIT/L
NITRITE UR QL STRIP.AUTO: NEGATIVE
PH UR STRIP.AUTO: 5.5 [PH]
PROT UR QL STRIP.AUTO: 30 MG/DL
RBC #/AREA URNS AUTO: ABNORMAL /HPF
RBC UR QL AUTO: ABNORMAL UNIT/L
SP GR UR STRIP.AUTO: 1.02
SQUAMOUS #/AREA URNS LPF: ABNORMAL /HPF
UROBILINOGEN UR STRIP-ACNC: 4 MG/DL
WBC #/AREA URNS AUTO: >=100 /HPF

## 2022-09-14 PROCEDURE — 25000003 PHARM REV CODE 250: Performed by: EMERGENCY MEDICINE

## 2022-09-14 PROCEDURE — 99284 EMERGENCY DEPT VISIT MOD MDM: CPT

## 2022-09-14 PROCEDURE — 81001 URINALYSIS AUTO W/SCOPE: CPT | Performed by: EMERGENCY MEDICINE

## 2022-09-14 RX ORDER — AMOXICILLIN AND CLAVULANATE POTASSIUM 875; 125 MG/1; MG/1
1 TABLET, FILM COATED ORAL 2 TIMES DAILY
Qty: 14 TABLET | Refills: 0 | Status: SHIPPED | OUTPATIENT
Start: 2022-09-14 | End: 2022-11-30

## 2022-09-14 RX ORDER — MELOXICAM 7.5 MG/1
7.5 TABLET ORAL DAILY PRN
Qty: 20 TABLET | Refills: 0 | Status: SHIPPED | OUTPATIENT
Start: 2022-09-14 | End: 2022-11-30

## 2022-09-14 RX ORDER — AMOXICILLIN AND CLAVULANATE POTASSIUM 875; 125 MG/1; MG/1
1 TABLET, FILM COATED ORAL
Status: COMPLETED | OUTPATIENT
Start: 2022-09-14 | End: 2022-09-14

## 2022-09-14 RX ADMIN — AMOXICILLIN AND CLAVULANATE POTASSIUM 1 TABLET: 875; 125 TABLET, FILM COATED ORAL at 06:09

## 2022-09-14 NOTE — ED PROVIDER NOTES
Encounter Date: 9/14/2022       History     Chief Complaint   Patient presents with    Flank Pain     Bilateral flank and groin pain, with dysuria. S/P angiogram 09/12/22     67-year-old male presents with bilateral flank pain, worse on the right, mild-to-moderate in intensity, since yesterday.  He also reports some discomfort with urination.  Denies fever, chills, weakness, or change in appetite.  When asked about groin pain, he states this is a problem that has been going on for over a month, unchanged, and he has already seen a urologist for it.    Review of patient's allergies indicates:  No Known Allergies  Past Medical History:   Diagnosis Date    A-fib     Anticoagulant long-term use     Aortic aneurysm     CHF (congestive heart failure)     Chronic atrial fibrillation     COPD (chronic obstructive pulmonary disease)     Coronary artery disease     HLD (hyperlipidemia)     Hypertension     Mitral regurgitation     PAD (peripheral artery disease)     Primary open angle glaucoma (POAG)      Past Surgical History:   Procedure Laterality Date    Heart Stent N/A      No family history on file.  Social History     Tobacco Use    Smoking status: Every Day     Packs/day: 0.25     Years: 40.00     Pack years: 10.00     Types: Cigarettes    Smokeless tobacco: Never   Substance Use Topics    Alcohol use: Yes     Alcohol/week: 1.0 standard drink     Types: 1 Cans of beer per week     Comment: socially    Drug use: Yes     Frequency: 1.0 times per week     Types: Marijuana     Comment: once a month     Review of Systems   Constitutional: Negative.  Negative for chills and fever.   HENT:  Negative for congestion, rhinorrhea and sore throat.    Eyes:  Negative for visual disturbance.   Respiratory:  Negative for cough and shortness of breath.    Cardiovascular:  Negative for chest pain, palpitations and leg swelling.   Gastrointestinal:  Negative for abdominal pain, diarrhea, nausea and vomiting.   Genitourinary:  Positive  for dysuria. Negative for flank pain and frequency.   Musculoskeletal:  Negative for myalgias.   Skin:  Negative for rash.   All other systems reviewed and are negative.    Physical Exam     Initial Vitals [09/14/22 0420]   BP Pulse Resp Temp SpO2   (!) 154/80 72 20 97.9 °F (36.6 °C) 99 %      MAP       --         Physical Exam    Nursing note and vitals reviewed.  Constitutional: He appears well-developed and well-nourished.   HENT:   Head: Normocephalic and atraumatic.   Eyes: Conjunctivae are normal. Pupils are equal, round, and reactive to light.   Neck: Neck supple.   Normal range of motion.  Cardiovascular:  Normal rate, regular rhythm, normal heart sounds and intact distal pulses.           Pulmonary/Chest: Breath sounds normal.   Abdominal: Abdomen is soft. Bowel sounds are normal. There is no abdominal tenderness.   Musculoskeletal:         General: No tenderness or edema. Normal range of motion.      Cervical back: Normal range of motion and neck supple.      Comments: Bilateral calves are symmetric, normal in appearance, no tenderness to palpation.     Neurological: He is alert and oriented to person, place, and time.   Skin: Skin is warm and dry.   Psychiatric: He has a normal mood and affect.       ED Course   Procedures  Labs Reviewed   URINALYSIS, REFLEX TO URINE CULTURE - Abnormal; Notable for the following components:       Result Value    Appearance, UA Turbid (*)     Protein, UA 30 (*)     Ketones, UA 5 (*)     Blood, UA 2+ (*)     Urobilinogen, UA 4.0 (*)     Leukocyte Esterase,  (*)     WBC, UA >=100 (*)     Bacteria, UA Trace (*)     All other components within normal limits   CULTURE, URINE          Imaging Results    None          Medications   amoxicillin-clavulanate 875-125mg per tablet 1 tablet (1 tablet Oral Given 9/14/22 0600)     Medical Decision Making:   Initial Assessment:   67-year-old male with apparent UTI, no significant CVA tenderness, however will treat as early pyelo due  to pain lateralizing to the right.  Return precautions given, patient is agreeable to plan.                        Clinical Impression:   Final diagnoses:  [N12] Pyelonephritis (Primary)      ED Disposition Condition    Discharge Stable          ED Prescriptions       Medication Sig Dispense Start Date End Date Auth. Provider    amoxicillin-clavulanate 875-125mg (AUGMENTIN) 875-125 mg per tablet Take 1 tablet by mouth 2 (two) times daily. 14 tablet 9/14/2022 -- Jerald Garnett MD    meloxicam (MOBIC) 7.5 MG tablet Take 1 tablet (7.5 mg total) by mouth daily as needed for Pain. 20 tablet 9/14/2022 -- Jerald Garnett MD          Follow-up Information       Follow up With Specialties Details Why Contact Info    Tiffanie Chase, DO Internal Medicine  if not improving daily [address] [facility] [id number] [phone]               Jerald Garnett MD  09/14/22 5128

## 2022-09-16 LAB — BACTERIA UR CULT: ABNORMAL

## 2022-10-06 ENCOUNTER — OFFICE VISIT (OUTPATIENT)
Dept: CARDIOLOGY | Facility: CLINIC | Age: 67
End: 2022-10-06
Payer: MEDICARE

## 2022-10-06 VITALS
DIASTOLIC BLOOD PRESSURE: 61 MMHG | BODY MASS INDEX: 27.99 KG/M2 | RESPIRATION RATE: 20 BRPM | OXYGEN SATURATION: 98 % | TEMPERATURE: 98 F | SYSTOLIC BLOOD PRESSURE: 90 MMHG | WEIGHT: 199.94 LBS | HEIGHT: 71 IN | HEART RATE: 56 BPM

## 2022-10-06 DIAGNOSIS — I25.10 ARTERIOSCLEROSIS OF CORONARY ARTERY: ICD-10-CM

## 2022-10-06 DIAGNOSIS — E78.5 DYSLIPIDEMIA: ICD-10-CM

## 2022-10-06 DIAGNOSIS — I10 PRIMARY HYPERTENSION: Primary | ICD-10-CM

## 2022-10-06 DIAGNOSIS — I50.20 HFREF (HEART FAILURE WITH REDUCED EJECTION FRACTION): ICD-10-CM

## 2022-10-06 DIAGNOSIS — I73.9 PVD (PERIPHERAL VASCULAR DISEASE): ICD-10-CM

## 2022-10-06 DIAGNOSIS — I34.0 NONRHEUMATIC MITRAL VALVE REGURGITATION: ICD-10-CM

## 2022-10-06 DIAGNOSIS — I48.20 CHRONIC ATRIAL FIBRILLATION: ICD-10-CM

## 2022-10-06 DIAGNOSIS — Z72.0 TOBACCO USER: ICD-10-CM

## 2022-10-06 PROCEDURE — 99215 OFFICE O/P EST HI 40 MIN: CPT | Mod: PBBFAC | Performed by: INTERNAL MEDICINE

## 2022-10-06 RX ORDER — ASPIRIN 81 MG/1
81 TABLET ORAL DAILY
COMMUNITY
Start: 2022-06-02 | End: 2024-03-18

## 2022-10-06 NOTE — PROGRESS NOTES
CHIEF COMPLAINT: Cardiac Comorbidities (HFrEF with Functional MR)                          HPI:    Mr. Ran Reyes Jr. is a 67 y.o. male with NICM-HFrEF (LVEF 41%) with functional MR, HTN, HLD, chronic AF, PAD, and tobacco abuse who presents for follow up.    He report orthopnea (can't sleep laying down; only sitting up) and bendopnea. Denies [place] (until he has walked 3-4 blocks), early satiety, abdominal distension, or lower extremity edema. Reports adherence with loop and appropriate UOP. Does not check BP at home. States he never tried Entresto. Otherwise on GDMT but we will be making changes as BP can tolerate.     Denies symptoms of angina. Reports [place] after 3-4 blocks of walking.     Denies symptoms of claudication other than intermittent bilateral hip pain that radiates down his lower extremities but denies typical cramping sensation in lower extremities.     Reports adherence with his moderate intensity statin. LDL at last check was 49.     Continues to smoke 5-6 cigarettes daily.        Continues to be adherent with his BB and DOAC for AF.     Has planned MitraClip visit and lower extremity angiography in near future.     Patient Active Problem List   Diagnosis    Primary open angle glaucoma (POAG) of right eye, moderate stage    Combined forms of age-related cataract of left eye    Arteriosclerosis of coronary artery    Chronic atrial fibrillation    Congestive heart failure    Dyslipidemia    Hypertension    Tobacco user    PVD (peripheral vascular disease)    VHD (valvular heart disease)    COVID-19       Past Surgical History:   Procedure Laterality Date    Heart Stent N/A        Social History     Socioeconomic History    Marital status: Single   Tobacco Use    Smoking status: Every Day     Packs/day: 0.25     Years: 40.00     Pack years: 10.00     Types: Cigarettes    Smokeless tobacco: Never   Substance and Sexual Activity    Alcohol use: Yes     Alcohol/week: 1.0 standard drink      Types: 1 Cans of beer per week     Comment: socially    Drug use: Yes     Frequency: 1.0 times per week     Types: Marijuana     Comment: once a month          History reviewed. No pertinent family history.    Review of patient's allergies indicates:  No Known Allergies      Current Outpatient Medications:     ADVAIR HFA 45-21 mcg/actuation HFAA inhaler, INHALE 2 PUFFS TWICE DAILY RINSE MOUTH & THROAT AFTER EACH USE, Disp: , Rfl:     aspirin (ECOTRIN) 81 MG EC tablet, Take 81 mg by mouth once daily., Disp: , Rfl:     ipratropium (ATROVENT HFA) 17 mcg/actuation inhaler, Inhale into the lungs., Disp: , Rfl:     lisinopriL (PRINIVIL,ZESTRIL) 5 MG tablet, Take 5 mg by mouth., Disp: , Rfl:     metoprolol succinate (TOPROL-XL) 25 MG 24 hr tablet, Take 12.5 mg by mouth once daily., Disp: , Rfl:     rivaroxaban (XARELTO) 20 mg Tab, Take 20 mg by mouth., Disp: , Rfl:     simvastatin (ZOCOR) 40 MG tablet, Take 40 mg by mouth., Disp: , Rfl:     spironolactone (ALDACTONE) 25 MG tablet, Take 25 mg by mouth., Disp: , Rfl:     torsemide (DEMADEX) 20 MG Tab, Take 20 mg by mouth once daily., Disp: , Rfl:     amoxicillin-clavulanate 875-125mg (AUGMENTIN) 875-125 mg per tablet, Take 1 tablet by mouth 2 (two) times daily. (Patient not taking: Reported on 10/6/2022), Disp: 14 tablet, Rfl: 0    HYDROcodone-acetaminophen (NORCO) 5-325 mg per tablet, Take 1 tablet by mouth every 4 (four) hours as needed for Pain. (Patient not taking: Reported on 10/6/2022), Disp: 12 tablet, Rfl: 0    latanoprost (XALATAN) 0.005 % ophthalmic solution, Place 1 drop into both eyes once daily. (Patient not taking: Reported on 10/6/2022), Disp: 2.5 mL, Rfl: 1    meloxicam (MOBIC) 7.5 MG tablet, Take 1 tablet (7.5 mg total) by mouth daily as needed for Pain. (Patient not taking: Reported on 10/6/2022), Disp: 20 tablet, Rfl: 0    nicotine (NICODERM CQ) 7 mg/24 hr, Place 1 patch onto the skin once daily. (Patient not taking: Reported on  "10/6/2022), Disp: 14 patch, Rfl: 3    nicotine polacrilex 4 MG Lozg, Take 1 lozenge (4 mg total) by mouth as needed (withdrawl from nicotine). (Patient not taking: Reported on 10/6/2022), Disp: 108 lozenge, Rfl: 3    Current Facility-Administered Medications:     sodium chloride 0.9% flush 10 mL, 10 mL, Intravenous, PRN, Yahaira Tellez MD    sodium chloride 0.9% flush 10 mL, 10 mL, Intravenous, PRN, Yahaira Tellez MD     ROS:                                                                                                                                                                             Denies headaches, changes in vision, nausea, vomiting, fever, chills, chest pain, palpitations, dyspnea, abdominal pain, changes in urinary or bowel habits.      PE:  Blood pressure 90/61, pulse (!) 56, temperature 98.4 °F (36.9 °C), temperature source Oral, resp. rate 20, height 5' 11" (1.803 m), weight 90.7 kg (199 lb 15.3 oz), SpO2 98 %.  Physical Exam  Vitals reviewed.   Constitutional:       Appearance: He is obese.   HENT:      Head: Normocephalic and atraumatic.      Right Ear: External ear normal.      Left Ear: External ear normal.      Nose: Nose normal.      Mouth/Throat:      Mouth: Mucous membranes are moist.   Eyes:      Conjunctiva/sclera: Conjunctivae normal.   Cardiovascular:      Rate and Rhythm: Normal rate. Rhythm irregular.      Pulses: Normal pulses.      Heart sounds: Normal heart sounds. No murmur heard.  Pulmonary:      Effort: Pulmonary effort is normal. No respiratory distress.      Breath sounds: Normal breath sounds. No stridor. No wheezing, rhonchi or rales.   Chest:      Chest wall: No tenderness.   Abdominal:      General: Abdomen is flat. Bowel sounds are normal. There is no distension.   Musculoskeletal:      Cervical back: Neck supple.      Right lower leg: No edema.      Left lower leg: No edema.   Skin:     General: Skin is warm and dry.      Capillary Refill: Capillary refill takes less " than 2 seconds.   Neurological:      Mental Status: He is alert and oriented to person, place, and time.   Psychiatric:         Mood and Affect: Mood normal.         Behavior: Behavior normal.                                                                                                                                                                                                                                                                                                                                                                                                                                                                              CARDIAC TESTING:             ASSESSMENT/PLAN:    1. Primary hypertension  -Hypotensive today.   -Continue GDMT medications and stopped Isordil. No angina and no benefit alone with Hydralazine in HFrEF.     2. Chronic Atrial Fibrillation  -Continue Xarelto 20 mg daily. CHADSVASc 4.   -Continue Toprol XL 12.5 mg BID.    3. Tobacco user  -Cessation recommend.     4. HFrEF (heart failure with reduced ejection fraction)  -Probable NICM. Stage C. Class II.   -Continue Lisinopril 5 mg daily. In future increase if BP can tolerate or consider Entresto if BP can tolerate (needs 36 hour washout if using ARNI).   -Continue Toprol XL 12.5 mg daily.   -Continue Aldactone 25 mg daily.   -Consider SGLT-2 in future.   -Stop Isordil. No benefit in HFrEF without Hydralazine.   -Has functional MR.     5. PVD (peripheral vascular disease)  -ASA 81 mg daily.   -Statin therapy. Consider high intensity in future but LDL is 49.     6. Arteriosclerosis of coronary artery  -Continue statin therapy. LDL is at goal 49.   -Continue ASA 81 mg daily.     7. Dyslipidemia  -Continue statin therapy. LDL is at goal 49.     8. Nonrheumatic mitral valve regurgitation  -Functional MR secondary to HFrEF.    Vernon Cifuentes MD

## 2022-10-06 NOTE — PROGRESS NOTES
Cardiology attending addendum  Patient's case discussed with cardiology fellow Dr. Vernon Cifuentes. Agree with plan as outlined above.     Dayanna Johnson MD  Cardiology-[facility]

## 2022-11-29 NOTE — PROGRESS NOTES
[facility] INTERNAL MEDICINE  OUTPATIENT OFFICE VISIT NOTE    SUBJECTIVE:      Chief Complaint: Follow-up (Right leg numbness)       HPI: Ran Reyes Jr. is a 67 y.o. yo male w/ PMH of  has a past medical history of A-fib, Anticoagulant long-term use, Aortic aneurysm, CHF (congestive heart failure), Chronic atrial fibrillation, COPD (chronic obstructive pulmonary disease), Coronary artery disease, HLD (hyperlipidemia), Hypertension, Mitral regurgitation, PAD (peripheral artery disease), and Primary open angle glaucoma (POAG)., who presents for scheduled follow up appointment.   He had recent ED visit where he was found to have UTI and early pyelonephritis, was treated outpatient with augmentin BID for 7 days and mobic. He has since completed the antibiotics and has no urinary complaints today.   He additionally states he has chronic lower leg numbness and tingling that he believes is associated with his peripheral artery disease. He has had two stents placed in the past. He states that these symptoms are unchanged from prior. He states that occasionally he feels that his legs are cold but this feeling never lasts longer than a couple of seconds and resolves when he hangs his legs off the side of the bed/chair.   He states that he sleeps with 2 pillows, has not increased recently, feels that his breathing is around his baseline, has not noticed any edema in sacrum or LE. Able to carry out his daily activities and still works as much as he ever has.        Past Medical History:   has a past medical history of A-fib, Anticoagulant long-term use, Aortic aneurysm, CHF (congestive heart failure), Chronic atrial fibrillation, COPD (chronic obstructive pulmonary disease), Coronary artery disease, HLD (hyperlipidemia), Hypertension, Mitral regurgitation, PAD (peripheral artery disease), and Primary open angle glaucoma (POAG).     Past Surgical History:   has a past surgical history that includes Heart Stent (N/A).     Family  History:  family history includes Cancer in his mother; Heart failure in his father; Hypertension in his father and mother; No Known Problems in his sister; Stroke in his father.     Social History:   reports that he has been smoking cigarettes. He has a 10.00 pack-year smoking history. He has never used smokeless tobacco. He reports current alcohol use of about 1.0 standard drink per week. He reports current drug use. Frequency: 1.00 time per week. Drug: Marijuana.     Allergies:  has No Known Allergies.     Home Medications:  Prior to Admission medications    Medication Sig Start Date End Date Taking? Authorizing Provider   ADVAIR HFA 45-21 mcg/actuation HFAA inhaler INHALE 2 PUFFS TWICE DAILY RINSE MOUTH & THROAT AFTER EACH USE 1/4/22   Historical Provider   amoxicillin-clavulanate 875-125mg (AUGMENTIN) 875-125 mg per tablet Take 1 tablet by mouth 2 (two) times daily.  Patient not taking: Reported on 10/6/2022 9/14/22   Jerald Garnett MD   aspirin (ECOTRIN) 81 MG EC tablet Take 81 mg by mouth once daily. 6/2/22   Historical Provider   HYDROcodone-acetaminophen (NORCO) 5-325 mg per tablet Take 1 tablet by mouth every 4 (four) hours as needed for Pain.  Patient not taking: Reported on 10/6/2022 5/9/22   MILES Panchal   ipratropium (ATROVENT HFA) 17 mcg/actuation inhaler Inhale into the lungs. 11/17/21   Historical Provider   latanoprost (XALATAN) 0.005 % ophthalmic solution Place 1 drop into both eyes once daily.  Patient not taking: Reported on 10/6/2022 6/1/22 6/1/23  Liban Trejo MD   lisinopriL (PRINIVIL,ZESTRIL) 5 MG tablet Take 5 mg by mouth. 12/30/21   Historical Provider   meloxicam (MOBIC) 7.5 MG tablet Take 1 tablet (7.5 mg total) by mouth daily as needed for Pain.  Patient not taking: Reported on 10/6/2022 9/14/22   Jerald Garnett MD   metoprolol succinate (TOPROL-XL) 25 MG 24 hr tablet Take 12.5 mg by mouth once daily. 3/2/22   Historical Provider   nicotine (NICODERM CQ) 7 mg/24 hr Place 1 patch  "onto the skin once daily.  Patient not taking: Reported on 10/6/2022 6/2/22   Cecy Schneider MD   nicotine polacrilex 4 MG Lozg Take 1 lozenge (4 mg total) by mouth as needed (withdrawl from nicotine).  Patient not taking: Reported on 10/6/2022 6/2/22   Cecy Schneider MD   rivaroxaban (XARELTO) 20 mg Tab Take 20 mg by mouth. 12/30/21   Historical Provider   simvastatin (ZOCOR) 40 MG tablet Take 40 mg by mouth. 12/30/21   Historical Provider   spironolactone (ALDACTONE) 25 MG tablet Take 25 mg by mouth. 2/2/22   Historical Provider   torsemide (DEMADEX) 20 MG Tab Take 20 mg by mouth once daily. 2/16/22   Historical Provider       ROS:  Constitutional: no fever, fatigue, weakness  Eye: no vision loss, eye redness, drainage, or pain  ENMT: no sore throat, ear pain, sinus pain/congestion, nasal congestion/drainage  Respiratory: no cough, no wheezing, no shortness of breath. Occasional dyspnea on exertion when he walks more than 3 blocks. Uses 2 pillows at night.   Cardiovascular: no chest pain, no palpitations, no edema  Gastrointestinal: no nausea, vomiting, or diarrhea. No abdominal pain  Genitourinary: no dysuria, no urinary frequency or urgency, no hematuria  Hema/Lymph: no abnormal bruising or bleeding  Endocrine: no heat or cold intolerance, no excessive thirst or excessive urination  Musculoskeletal: no muscle or joint pain, no joint swelling  Integumentary: no skin rash or abnormal lesion  Neurologic: no headache, no dizziness, no weakness or numbness         OBJECTIVE:     Vital signs:   BP 92/60 (BP Location: Left arm, Patient Position: Sitting, BP Method: Large (Manual))   Temp 98.2 °F (36.8 °C) (Oral)   Resp 18   Ht 5' 11" (1.803 m)   Wt 92.8 kg (204 lb 9.6 oz)   BMI 28.54 kg/m²      Physical Examination:  General: Patient well-appearing, in no distress   Eye: EOMI, clear conjunctiva, eyelids normal  HENT: Head-normocephalic and atraumatic  Neck: full range of motion, no thyromegaly or " lymphadenopathy, trachea midline, supple, no palpable thyroid nodules  Respiratory: clear to auscultation bilaterally without wheezes, rales, rhonchi  Cardiovascular: regular rate (60) and rhythm without murmurs.  No gallops or rubs. LE well perfused, 2+ DP and PT pulses.  Gastrointestinal: soft, non-tender, non-distended with normal bowel sounds, without masses to palpation  Genitourinary: no CVA tenderness to palpation  Musculoskeletal: full range of motion of all extremities/spine without limitation or discomfort  Integumentary: no rashes or skin lesions present  Neurologic: no signs of peripheral neurological deficit, motor/sensory function intact  Psychiatric:  alert and oriented, cognitive function intact, cooperative with exam, good eye contact, judgement and insight intact, mood and affect full range.          ASSESSMENT & PLAN:       CHF with reduced EF  Functional mitral regurgitation  - echo 1/5/22 significant for EF of 41%  - current denies orthopnea, occasional dyspnea on exertion when walks more than 3 blocks, uses 2 pillows to sleep at night which is around his baseline   - was recently changed from lisinopril to entresto. Jardiance was recently added to regimen.  - current medical regimen includes ASA, plavix, Entresto, Toprol 12.5 mg daily, aldactone 25 mg daily, torsemide, Jardiance 10mg daily, reports compliance with all medications, brought all of his meds to this appt and we went through them all together.  - states if he misses doses of spironolactone or torsemide he noticed increased edema in LE and dyspnea.  - follows with cardiology, last saw 10/6/22, next appt is 2/9/23  - follows with [facility] also, next appt in Dec  - Was scheduled to have mitraclip placed however he states procedure was canceled by cardiologist.  - Consider Coreg in the future.    Atrial fibrillation  - on Xarelto, reports compliance. Continue .  - follows with cardiology at [facility] and [facility]  - pulse today 60  - denies  palpitations, chest pain, irregular heartbeat    HTN  - today in office 92/60 (manual reading)  - does not check BP at home. Encouraged pt to use his BP cuff and keep logs and bring to next appt.  - continue GDMT, at last cardiology appt Isordil was discontinued.    HLD  - reports compliance with moderate intensity statin  - last FLP showed that LDL at goal.  - continue current regimen Simvastatin 40 mg  Will repeat FLP before next visit.    Peripheral arterial disease  - intermittent claudication symptoms today. Pulses present on exam. LE well perfused and warm.  - ASA 81 mg daily, plavix  - is on moderate intensity statin  - last FLP showed LDL 49, at goal  - will repeat FLP before next visit  - states he has stents placed several years ago  - encouraged pt to go to nearest ED if claudication symptoms ever persist or if legs get cold / severely painful     infrarenal abdominal aortic aneurysm  - seen with maximum dimension 3.5 cm on CT A/P in 7/2022    COPD  - PFTs done in 2021 show no obstruction, mild restriction, mild diffusion defect, good bronchodilator response   - uses Atrovent  - no complaints today      Tobacco use  - 10 pack year history  - continues to smoke 5-6 cigarettes daily.  - encouraged cessation today, informed about smoking cessation program here    Suspicious lung mass on prior CT (resolved)  - Cleared by pulmonology 3/22      Health Maintenance  Vaccinations   -Flu: Done today 11/30/2022    -Pneumonia: due, returning for Saturday clinic   -Shingles: due for second dose , returning for Saturday clinic   -Tdap: done in 2019   -COVID: Refused  Screening   -Colon Ca. Screening: will send cologuard today   -Hep C, HIV: nonreactive in 2021  Social   -EtOH: 1-2 alc bevs a week   -Tobacco: 10 pack year history, smokes 5-6 cigs/day   -Caffeine: Denies History   -Drugs: uses marijuana once per week      States he needs no refills today. Ordered cologuard.  Return to clinic in 6 month(s).      Tiffanie  Karsten Chase, DO  [facility] Internal Medicine  HO-1

## 2022-11-30 ENCOUNTER — OFFICE VISIT (OUTPATIENT)
Dept: INTERNAL MEDICINE | Facility: CLINIC | Age: 67
End: 2022-11-30
Payer: MEDICARE

## 2022-11-30 VITALS
WEIGHT: 204.63 LBS | DIASTOLIC BLOOD PRESSURE: 60 MMHG | SYSTOLIC BLOOD PRESSURE: 92 MMHG | TEMPERATURE: 98 F | HEIGHT: 71 IN | RESPIRATION RATE: 18 BRPM | BODY MASS INDEX: 28.65 KG/M2

## 2022-11-30 DIAGNOSIS — Z23 NEED FOR INFLUENZA VACCINATION: ICD-10-CM

## 2022-11-30 DIAGNOSIS — F17.210 HEAVY CIGARETTE SMOKER: ICD-10-CM

## 2022-11-30 DIAGNOSIS — Z12.12 SCREENING FOR CANCER OF THE RECTUM: Primary | ICD-10-CM

## 2022-11-30 DIAGNOSIS — E78.5 HYPERLIPIDEMIA, UNSPECIFIED HYPERLIPIDEMIA TYPE: ICD-10-CM

## 2022-11-30 PROCEDURE — 99214 OFFICE O/P EST MOD 30 MIN: CPT | Mod: PBBFAC,25

## 2022-11-30 PROCEDURE — G0008 ADMIN INFLUENZA VIRUS VAC: HCPCS | Mod: PBBFAC

## 2022-11-30 PROCEDURE — 90662 IIV NO PRSV INCREASED AG IM: CPT | Mod: PBBFAC

## 2022-11-30 RX ORDER — CLOPIDOGREL BISULFATE 75 MG/1
75 TABLET ORAL DAILY
COMMUNITY
End: 2023-12-28 | Stop reason: SDUPTHER

## 2022-11-30 RX ORDER — SACUBITRIL AND VALSARTAN 24; 26 MG/1; MG/1
1 TABLET, FILM COATED ORAL 2 TIMES DAILY
COMMUNITY
End: 2023-02-09

## 2022-11-30 RX ADMIN — INFLUENZA A VIRUS A/MICHIGAN/45/2015 X-275 (H1N1) ANTIGEN (FORMALDEHYDE INACTIVATED), INFLUENZA A VIRUS A/SINGAPORE/INFIMH-16-0019/2016 IVR-186 (H3N2) ANTIGEN (FORMALDEHYDE INACTIVATED), AND INFLUENZA B VIRUS B/MARYLAND/15/2016 BX-69A (A B/COLORADO/6/2017-LIKE VIRUS) ANTIGEN (FORMALDEHYDE INACTIVATED) 0.5 ML: 60; 60; 60 INJECTION, SUSPENSION INTRAMUSCULAR at 03:11

## 2022-11-30 NOTE — PROGRESS NOTES
I have reviewed and concur with the resident's history, physical, assessment, and plan.  I have discussed with him all issues related to the diagnosis, workup and treatment plan.Care provided as reasonable and necessary.Multiple cardaic issues stable. AAA 3.5 cm    Terry Boyd MD  [facility]

## 2022-12-10 ENCOUNTER — OFFICE VISIT (OUTPATIENT)
Dept: INTERNAL MEDICINE | Facility: CLINIC | Age: 67
End: 2022-12-10
Payer: MEDICARE

## 2022-12-10 VITALS
TEMPERATURE: 98 F | HEART RATE: 76 BPM | BODY MASS INDEX: 28.53 KG/M2 | OXYGEN SATURATION: 98 % | RESPIRATION RATE: 18 BRPM | HEIGHT: 71 IN | DIASTOLIC BLOOD PRESSURE: 70 MMHG | SYSTOLIC BLOOD PRESSURE: 107 MMHG | WEIGHT: 203.81 LBS

## 2022-12-10 DIAGNOSIS — Z87.891 PERSONAL HISTORY OF NICOTINE DEPENDENCE: ICD-10-CM

## 2022-12-10 DIAGNOSIS — Z00.00 WELLNESS EXAMINATION: Primary | ICD-10-CM

## 2022-12-10 DIAGNOSIS — Z72.0 TOBACCO USE: ICD-10-CM

## 2022-12-10 PROCEDURE — 99215 OFFICE O/P EST HI 40 MIN: CPT | Mod: PBBFAC

## 2022-12-10 NOTE — PROGRESS NOTES
"Ran Reyes 67 y.o. yo male w/ PMH of  has a past medical history of A-fib, Anticoagulant long-term use, Aortic aneurysm, CHF (congestive heart failure), Chronic atrial fibrillation, COPD (chronic obstructive pulmonary disease), Coronary artery disease, HLD (hyperlipidemia), Hypertension, Mitral regurgitation, PAD (peripheral artery disease), and Primary open angle glaucoma (POAG). presented for a  Medicare AWV and comprehensive Health Risk Assessment today. The following components were reviewed and updated:    Medical history  Family History  Social history  Allergies and Current Medications  Health Risk Assessment  Health Maintenance  Care Team         ** See Completed Assessments for Annual Wellness Visit within the encounter summary.**         The following assessments were completed:  Living Situation  CAGE  Depression Screening  Timed Get Up and Go  Whisper Test  Cognitive Function Screening  Nutrition Screening  ADL Screening  PAQ Screening        Vitals:    12/10/22 1040   BP: 107/70   BP Location: Left arm   Patient Position: Sitting   BP Method: Large (Automatic)   Pulse: 76   Resp: 18   Temp: 98 °F (36.7 °C)   TempSrc: Oral   SpO2: 98%   Weight: 92.4 kg (203 lb 12.8 oz)   Height: 5' 10.98" (1.803 m)     Body mass index is 28.44 kg/m².  Physical Exam      General:  Well developed, well nourished, no acute respiratory distress  Head: Normocephalic, atraumatic  Eyes: PERRL, EOMI, anicteric sclera  Throat: No posterior pharyngeal erythema or exudate, no tonsillar exudate  Neck: supple, normal ROM, no thyromegaly   CVS:  RRR, S1 and S2 normal, no murmurs, no added heart sounds, rubs, gallops, 2+ peripheral pulses  Resp:  Lungs clear to auscultation bilaterally, no wheezes, rales, or rhonci  GI:  Abdomen soft, non-tender, non-distended, normoactive bowel sounds  MSK:  No muscle atrophy, cyanosis, peripheral edema, full range of motion  Skin:  No rashes, ulcers, erythema  Neuro:  Alert and oriented x3, " No focal neuro deficits, CNII-XII grossly intact  Psych:  Appropriate mood and affect       Diagnoses and health risks identified today and associated recommendations/orders:    Wellness Examination  -patient is in need of  pneumococcal vaccine  -patient is up-to-date Tdap and influenza vaccine  -patient received 2nd dose of shingle vaccine today  - ordered Low dose CT scan and AAA US  - Advised patient to complete cologuard that was sent on 11/30/2022  -follow-up in wellness clinic in 1 year.        Provided Ran with a 5-10 year written screening schedule and personal prevention plan. Recommendations were developed using the USPSTF age appropriate recommendations. Education, counseling, and referrals were provided as needed. After Visit Summary printed and given to patient which includes a list of additional screenings\tests needed.    Follow up in about 1 year (around 12/10/2023).    Vignesh Guaman MD

## 2023-01-05 ENCOUNTER — OFFICE VISIT (OUTPATIENT)
Dept: OPHTHALMOLOGY | Facility: CLINIC | Age: 68
End: 2023-01-05
Payer: MEDICARE

## 2023-01-05 VITALS — BODY MASS INDEX: 29.16 KG/M2 | WEIGHT: 203.69 LBS | HEIGHT: 70 IN

## 2023-01-05 DIAGNOSIS — H25.812 COMBINED FORMS OF AGE-RELATED CATARACT OF LEFT EYE: Primary | ICD-10-CM

## 2023-01-05 PROCEDURE — 99213 OFFICE O/P EST LOW 20 MIN: CPT | Mod: PBBFAC,PO | Performed by: STUDENT IN AN ORGANIZED HEALTH CARE EDUCATION/TRAINING PROGRAM

## 2023-01-05 NOTE — PROGRESS NOTES
OCT mac (6/1/22)  OD:normal foveal contour  OS: no view    OCT RNFL (6/1/22):  OD: 74, green SNI, yellow T  OS: no view    HVF 24-2 (6/1/22):  OD: 1/11 FL, 3% FP, 2% FN, superior defects, inferonasal defect    B scan OS (6/1/22): no RD or masses    Assessment/Plan:  1. Cataract OS>OD  - Patient with visually significant cataract and desires surgical removal. Unable to read fine print, has trouble recognizing faces. Thoroughly discussed cataract surgery today, risks/benefits/alternatives discussed and informed consent obtained, signed, and placed in the chart.  - Unable to Mrx today  - IOP today: 14//13  - Trauma: recent head trauma, not directly to eye  - Guttae: no  - Phaco/iridodonesis: none  - Trypan blue: yes  - Flomax use: no  - Dilation: good  - Anticoagulant/antiplatelet use: xarelto  - Cooperative with exam: Pt able to lie flat for 30 minutes, will plan to do under local  - Comorbidities: CHF, afib on xarelto  - Medical clearance: Needs cardiology clearance   - Lens to be used: +19.50   - Date of surgery: Will tentatively plan for 1/19/22, but will need cardiology clearance. Will call patient after clearance obtained. Patient reports he needs to be at the end of the schedule due to transportation issues.   - RTC: POD1    2. POAG, moderate OD  - No view OS 2/2 to cataract  - CDR 0.55  - IOP mid teens  - HVF today with superior/nasal defects  - OCT RNFL with temp thinning  - after #1 addressed for CCT, gonio

## 2023-01-10 ENCOUNTER — HOSPITAL ENCOUNTER (OUTPATIENT)
Dept: RADIOLOGY | Facility: HOSPITAL | Age: 68
Discharge: HOME | End: 2023-01-10
Payer: MEDICARE

## 2023-01-10 DIAGNOSIS — Z72.0 TOBACCO USE: ICD-10-CM

## 2023-01-10 DIAGNOSIS — Z87.891 PERSONAL HISTORY OF NICOTINE DEPENDENCE: ICD-10-CM

## 2023-01-10 PROCEDURE — 76706 US ABDL AORTA SCREEN AAA: CPT | Mod: TC

## 2023-01-10 PROCEDURE — 71271 CT THORAX LUNG CANCER SCR C-: CPT | Mod: TC

## 2023-01-12 DIAGNOSIS — I71.40 ABDOMINAL AORTIC ANEURYSM (AAA) WITHOUT RUPTURE, UNSPECIFIED PART: Primary | ICD-10-CM

## 2023-01-12 DIAGNOSIS — Z72.0 TOBACCO ABUSE: ICD-10-CM

## 2023-01-12 RX ORDER — NICOTINE 7MG/24HR
1 PATCH, TRANSDERMAL 24 HOURS TRANSDERMAL DAILY
Qty: 14 PATCH | Refills: 3 | Status: SHIPPED | OUTPATIENT
Start: 2023-01-12 | End: 2024-03-18

## 2023-01-12 NOTE — PROGRESS NOTES
I called patient and notified him of results from abdominal aortic aneurysm ultrasound which revealed patient had a 3 x 3 x 2.9 cm aneurysm in the distal abdominal aorta with thrombus associated around the aneurysm.  At ordered a repeat ultrasound for 6 months to observe if aneurysm is stable or getting larger.  If it grows greater than 0.5 cm in 6 months then patient would be a candidate for intervention.  If it is stable patient can follow-up annually.  I discussed with patient signs and symptoms to look out for such as severe abdominal pain, pulsating abdomen that he needed to head to the ED as soon as possible.  Patient understood instructions.  I advised patient that he needs to stop smoking and stated that he would need nicotine patches to help with cessation of smoking.  I sent over nicotine patches to [facility] pharmacy for patient .  I notified PCP of all the above

## 2023-01-13 RX ORDER — SODIUM CHLORIDE 0.9 % (FLUSH) 0.9 %
10 SYRINGE (ML) INJECTION
Status: DISCONTINUED | OUTPATIENT
Start: 2023-01-13 | End: 2023-03-09 | Stop reason: HOSPADM

## 2023-01-13 RX ORDER — PHENYLEPHRINE HYDROCHLORIDE 25 MG/ML
1 SOLUTION/ DROPS OPHTHALMIC
Status: CANCELLED | OUTPATIENT
Start: 2023-02-16

## 2023-01-13 RX ORDER — PROPARACAINE HYDROCHLORIDE 5 MG/ML
1 SOLUTION/ DROPS OPHTHALMIC
Status: CANCELLED | OUTPATIENT
Start: 2023-02-16

## 2023-01-13 RX ORDER — TROPICAMIDE 10 MG/ML
1 SOLUTION/ DROPS OPHTHALMIC
Status: CANCELLED | OUTPATIENT
Start: 2023-02-16

## 2023-01-13 RX ORDER — CYCLOPENTOLATE HYDROCHLORIDE 10 MG/ML
1 SOLUTION/ DROPS OPHTHALMIC
Status: CANCELLED | OUTPATIENT
Start: 2023-02-16

## 2023-02-09 ENCOUNTER — OFFICE VISIT (OUTPATIENT)
Dept: CARDIOLOGY | Facility: CLINIC | Age: 68
End: 2023-02-09
Payer: MEDICAID

## 2023-02-09 VITALS
OXYGEN SATURATION: 99 % | SYSTOLIC BLOOD PRESSURE: 124 MMHG | DIASTOLIC BLOOD PRESSURE: 85 MMHG | RESPIRATION RATE: 20 BRPM | BODY MASS INDEX: 28.52 KG/M2 | WEIGHT: 210.56 LBS | HEART RATE: 72 BPM | HEIGHT: 72 IN | TEMPERATURE: 97 F

## 2023-02-09 DIAGNOSIS — I10 PRIMARY HYPERTENSION: ICD-10-CM

## 2023-02-09 DIAGNOSIS — I34.0 NONRHEUMATIC MITRAL VALVE REGURGITATION: ICD-10-CM

## 2023-02-09 DIAGNOSIS — I48.20 CHRONIC ATRIAL FIBRILLATION: Primary | ICD-10-CM

## 2023-02-09 DIAGNOSIS — I50.20 HFREF (HEART FAILURE WITH REDUCED EJECTION FRACTION): ICD-10-CM

## 2023-02-09 DIAGNOSIS — I73.9 PVD (PERIPHERAL VASCULAR DISEASE): ICD-10-CM

## 2023-02-09 PROCEDURE — 99215 OFFICE O/P EST HI 40 MIN: CPT | Mod: PBBFAC | Performed by: INTERNAL MEDICINE

## 2023-02-09 RX ORDER — SIMVASTATIN 40 MG/1
40 TABLET, FILM COATED ORAL NIGHTLY
Qty: 30 TABLET | Refills: 6 | Status: SHIPPED | OUTPATIENT
Start: 2023-02-09 | End: 2024-01-31 | Stop reason: SDUPTHER

## 2023-02-09 RX ORDER — FUROSEMIDE 20 MG/1
20 TABLET ORAL
COMMUNITY
Start: 2023-01-04 | End: 2023-12-28 | Stop reason: SDUPTHER

## 2023-02-09 NOTE — PROGRESS NOTES
"CHIEF COMPLAINT: Cardiac Comorbidities (HFrEF with Functional MR)                          HPI:    67 y.o. male with NICM-HFrEF (LVEF 41%) with functional MR (sever, posteriorly directed), HTN, HLD, chronic AF, PAD, and tobacco abuse who presents for follow up.    He is able to walk about 3-4 blocks before experiencing [place]. He denies CP, palpitations, n/v, or diaphoresis. He does state that he weighs more today (210lbs) than his usual (200lbs). He has not noticed peripheral edema but states that he can tell when fluid is "building up" based on the way his ring fits and it has gotten tighter. He reports adherence with loop diuretic and does report eating foods with added sodium. Lastly, pt reports an aneurysm in his abdomen that he was told will continue to be monitored (unable to find records of this, AAA?). He has no further complaints or concerns at this time.      Patient Active Problem List   Diagnosis    Primary open angle glaucoma (POAG) of right eye, moderate stage    Combined forms of age-related cataract of left eye    Arteriosclerosis of coronary artery    Chronic atrial fibrillation    Congestive heart failure    Dyslipidemia    Hypertension    Tobacco user    PVD (peripheral vascular disease)    VHD (valvular heart disease)    COVID-19       Past Surgical History:   Procedure Laterality Date    Heart Stent N/A        Social History     Socioeconomic History    Marital status: Single   Tobacco Use    Smoking status: Every Day     Packs/day: 0.25     Years: 40.00     Pack years: 10.00     Types: Cigarettes    Smokeless tobacco: Never   Substance and Sexual Activity    Alcohol use: Yes     Alcohol/week: 1.0 standard drink     Types: 1 Cans of beer per week     Comment: socially    Drug use: Yes     Frequency: 1.0 times per week     Types: Marijuana     Comment: once a month          History reviewed. No pertinent family history.    Review of patient's allergies indicates:  No Known Allergies      Current " Outpatient Medications:     ADVAIR HFA 45-21 mcg/actuation HFAA inhaler, INHALE 2 PUFFS TWICE DAILY RINSE MOUTH & THROAT AFTER EACH USE, Disp: , Rfl:     aspirin (ECOTRIN) 81 MG EC tablet, Take 81 mg by mouth once daily., Disp: , Rfl:     ipratropium (ATROVENT HFA) 17 mcg/actuation inhaler, Inhale into the lungs., Disp: , Rfl:     lisinopriL (PRINIVIL,ZESTRIL) 5 MG tablet, Take 5 mg by mouth., Disp: , Rfl:     metoprolol succinate (TOPROL-XL) 25 MG 24 hr tablet, Take 12.5 mg by mouth once daily., Disp: , Rfl:     rivaroxaban (XARELTO) 20 mg Tab, Take 20 mg by mouth., Disp: , Rfl:     simvastatin (ZOCOR) 40 MG tablet, Take 40 mg by mouth., Disp: , Rfl:     spironolactone (ALDACTONE) 25 MG tablet, Take 25 mg by mouth., Disp: , Rfl:     torsemide (DEMADEX) 20 MG Tab, Take 20 mg by mouth once daily., Disp: , Rfl:     amoxicillin-clavulanate 875-125mg (AUGMENTIN) 875-125 mg per tablet, Take 1 tablet by mouth 2 (two) times daily. (Patient not taking: Reported on 10/6/2022), Disp: 14 tablet, Rfl: 0    HYDROcodone-acetaminophen (NORCO) 5-325 mg per tablet, Take 1 tablet by mouth every 4 (four) hours as needed for Pain. (Patient not taking: Reported on 10/6/2022), Disp: 12 tablet, Rfl: 0    latanoprost (XALATAN) 0.005 % ophthalmic solution, Place 1 drop into both eyes once daily. (Patient not taking: Reported on 10/6/2022), Disp: 2.5 mL, Rfl: 1    meloxicam (MOBIC) 7.5 MG tablet, Take 1 tablet (7.5 mg total) by mouth daily as needed for Pain. (Patient not taking: Reported on 10/6/2022), Disp: 20 tablet, Rfl: 0    nicotine (NICODERM CQ) 7 mg/24 hr, Place 1 patch onto the skin once daily. (Patient not taking: Reported on 10/6/2022), Disp: 14 patch, Rfl: 3    nicotine polacrilex 4 MG Lozg, Take 1 lozenge (4 mg total) by mouth as needed (withdrawl from nicotine). (Patient not taking: Reported on 10/6/2022), Disp: 108 lozenge, Rfl: 3    Current Facility-Administered Medications:     sodium chloride 0.9% flush 10 mL, 10 mL,  Intravenous, PRN, Yahaira Tellez MD    sodium chloride 0.9% flush 10 mL, 10 mL, Intravenous, PRN, Yahaira Tellez MD     ROS:                                                                                                                                                                             Denies headaches, changes in vision, nausea, vomiting, fever, chills, chest pain, palpitations, dyspnea, abdominal pain, changes in urinary or bowel habits.      PE:  Blood pressure 124/85, pulse 72, temperature 97.4 °F (36.3 °C), temperature source Oral, resp. rate 20, height 6' (1.829 m), weight 95.5 kg (210 lb 8.6 oz), SpO2 99 %.  Physical Exam  Vitals reviewed.   Constitutional:       Appearance: He is obese.   HENT:      Head: Normocephalic and atraumatic.      Right Ear: External ear normal.      Left Ear: External ear normal.      Nose: Nose normal.      Mouth/Throat:      Mouth: Mucous membranes are moist.   Eyes:      Conjunctiva/sclera: Conjunctivae normal.   Cardiovascular:      Rate and Rhythm: Normal rate.      Pulses: Normal pulses.      Heart sounds: Normal heart sounds. No murmur heard.     Comments: Irregularly irregular rhythm  Pulmonary:      Effort: Pulmonary effort is normal. No respiratory distress.      Breath sounds: Normal breath sounds. No stridor. No wheezing, rhonchi or rales.   Chest:      Chest wall: No tenderness.   Abdominal:      General: Abdomen is flat. Bowel sounds are normal. There is no distension.   Musculoskeletal:      Cervical back: Neck supple.      Right lower leg: No edema.      Left lower leg: No edema.   Skin:     General: Skin is warm and dry.      Capillary Refill: Capillary refill takes less than 2 seconds.   Neurological:      Mental Status: He is alert and oriented to person, place, and time.   Psychiatric:         Mood and Affect: Mood normal.         Behavior: Behavior normal.                                                                                                                                                                                                                                                                                                                                                                                                                                                                               CARDIAC TESTING:  TTE (2022)        Nov 2022 September                 ASSESSMENT/PLAN:    1. Primary hypertension  -/85 today continue aldactone 25 will increase entresto to 49-51 BID    2. Chronic Atrial Fibrillation  -Continue Xarelto 20 mg daily. CHADSVASc 4.   -Continue Toprol XL 25 mg BID. Rate controlled afib today    3. Tobacco user  -Cessation recommend.     4. NICM/HFrEF (heart failure with reduced ejection fraction)  -Probable NICM. Stage C. Class II.   -Increasing entresto to 49-51 BID today  -Continue Toprol XL 25 .   -Continue Aldactone 25 mg daily.   -Continue jardiance 10mg qd   -wt 210lb today dry weight is ~200lb instructed to take daily weights in addition to extra dose of furosemide for 5 days (20mg BID furosemide x 5 days)before resuming 20mg qd dosing avoidance of high sodium containing foods discussed    5. Functional MR    -severe, posteriorly directed  -seen by Dr. Polanco and followed in structural clinic no plans for intervention at this time given NYHA class  continued monitoring planned    6. PAD (peripheral arterial disease)  -Plavix 75mg qd  -Continue home simvastatin    6. Arteriosclerosis of coronary artery  -Continue simvastatin 40 qd last LDL was at goal of 49 repeat ordered  -Continue ASA 81 mg daily.     7. Dyslipidemia  -Continue statin therapy. LDL is at goal 49.     8. Nonrheumatic mitral valve regurgitation  -Functional MR secondary to HFrEF.    Danielle Garduno MD

## 2023-02-15 ENCOUNTER — TELEPHONE (OUTPATIENT)
Dept: OPHTHALMOLOGY | Facility: CLINIC | Age: 68
End: 2023-02-15

## 2023-02-15 ENCOUNTER — ANESTHESIA EVENT (OUTPATIENT)
Dept: SURGERY | Facility: HOSPITAL | Age: 68
End: 2023-02-15
Payer: MEDICARE

## 2023-02-15 NOTE — TELEPHONE ENCOUNTER
02/15/2023 8:59 AM  Patient called to let us know that he does not have any transportation to get to surgery tomorrow. He would like to reschedule his surgery to 03/02/2023.

## 2023-02-15 NOTE — ANESTHESIA PREPROCEDURE EVALUATION
02/15/2023  Ran Reyes Jr. is a 67 y.o., male with PMHx of Afib, CAD/stents, HFrEF, severe MR, PAD/stents, HTN, HLD, smoking, COPD, TIA presents for Lt cataract extraction.    COVID STATUS: NOT VACCINATED; HOSP. 7/23-7/24/22 w/COVID, WEAKNESS, SEPSIS  BETA-BLOCKER: METOPROLOL (last dose 3/9/23 @ 0600)    PAT NURSE PHONE INTERVIEW 2/28/23    PROBLEM LIST:  -  LEFT EYE CATARACT (ALSO on RIGHT)      - 6/9/22 SURGERY CANCELLED per DR. Mascorro      - 2/16/23 SURGERY CANCELLED per PATIENT 2/2 LACK of TRANSPORTATION  -  A-FIB - on XARELTO             -  CAD - S/P > 10yrs. AGO STENT, BALLOON ANGIOPLASTY - on ASA 81mg  -  CHF - 11/8/22 ef 40%, RVSP 36mmHg      - S/P 1/5/22 ADRIENNE/DCCV    -  SEVERE MITRAL REGURG.  -  PAD - on PLAVIX      - S/P 9/12/22 LEFT SFA ATHERECTOMY, BALLOON ANGIOPLASTY      - S/P 10/17/22 RIGHT SFA ATHERECTOMY, BALLOON ANGIOPLASTY & STENT; RIGHT ANTERIOR TIBIAL ARTERY ATHERECTOMY, BALLOON ANGIOPLASTY     -  1/10/23 AAA US = The distal abdominal aorta measures 3.0 x 3 x 2.9 cm.  There is some aneurysmal dilatation. It is infrarenal. There is some associated thrombus around the aneurysm.  -  CAROTID ARTERY DISEASE  -  Hx TIA      - 2/7/19 MRI BRAIN = Chronic microvascular ischemia and atrophy.  -  GLAUCOMA  -  HTN  -  HLD  -  SYNCOPE (per CARDs CONCERNING for V-TACH)  -  CERVICAL & LUMBAR DISC DISEASE  -  BILAT. LE VARICOSITIS  -  ER 9/14/22 w/PYELONEPHRITIS; Rx'd AUGMENTIN x 7 DAYS, MOBIC  -  SMOKER 10 PPY; COPD, PULM. NODULE  -  ETOH (LASTLY 2 DAYS AGO)      - ER 5/1/22 w/ACUTE ETOH INTOXCIATION  -  MARIJUANA (CURRENT)     AM Rx DOS: ADVAIR,  ATROVENT HFA, METOPROLOL, ENTRESTO     ORDERS -   SURGEON: 3/10/22 PET/CT; 7/24/22 CBC, CMP; 1/10/23 CT CHEST;  ANESTHESIA:  NONE  CARDs ([facility]) 2/9/23 OV (SEE BELOW)  CARDs ([facility]) - DR. YUAN 2/13/23 OV, EKG; 11/8/22 ECHO; 10/17/22 PERIPHERAL  INTERVENTION; 9/12/22 PERIPHERAL INTERVENTION (IN MEDIA)  Pre-op Assessment    I have reviewed the NPO Status.      Review of Systems  Anesthesia Hx:  No problems with previous Anesthesia    Social:  Smoker    Cardiovascular:   Hypertension, well controlled Valvular problems/Murmurs, MR CAD  CABG/stent Dysrhythmias atrial fibrillation CHF PVD hyperlipidemia    Pulmonary:   COPD, moderate    Renal/:  Renal/ Normal     Hepatic/GI:  Hepatic/GI Normal    Neurological:   TIA,    Endocrine:  Endocrine Normal      Vitals:    03/09/23 0900 03/09/23 0948 03/09/23 0955   BP:  136/80 136/80   Pulse:  (!) 59    Temp:  36.4 °C (97.5 °F)    TempSrc:  Oral    SpO2:  100%    Weight: 93.5 kg (206 lb 3.2 oz)           Physical Exam  General: Alert, Cooperative and Well nourished    Airway:  Mallampati: II   Mouth Opening: Normal  TM Distance: Normal  Tongue: Normal  Neck ROM: Normal ROM    Dental:  Dentures    Chest/Lungs:  Clear to auscultation, Normal Respiratory Rate    Heart:  Rate: Normal  Rhythm: Regular Rhythm  Sounds: Normal      Lab Results   Component Value Date    WBC 4.4 (L) 07/24/2022    HGB 13.5 (L) 07/24/2022    HCT 42.0 07/24/2022    MCV 94.2 (H) 07/24/2022     (L) 07/24/2022       CMP  Sodium Level   Date Value Ref Range Status   07/24/2022 136 136 - 145 mmol/L Final     Potassium Level   Date Value Ref Range Status   07/24/2022 3.9 3.5 - 5.1 mmol/L Final     Carbon Dioxide   Date Value Ref Range Status   07/24/2022 26 23 - 31 mmol/L Final     Blood Urea Nitrogen   Date Value Ref Range Status   07/24/2022 15.9 8.4 - 25.7 mg/dL Final     Creatinine   Date Value Ref Range Status   07/24/2022 1.12 0.73 - 1.18 mg/dL Final     Calcium Level Total   Date Value Ref Range Status   07/24/2022 8.4 (L) 8.8 - 10.0 mg/dL Final     Albumin Level   Date Value Ref Range Status   07/24/2022 3.3 (L) 3.4 - 4.8 gm/dL Final     Bilirubin Total   Date Value Ref Range Status   07/24/2022 0.6 <=1.5 mg/dL Final     Alkaline  Phosphatase   Date Value Ref Range Status   07/24/2022 60 40 - 150 unit/L Final     Aspartate Aminotransferase   Date Value Ref Range Status   07/24/2022 38 (H) 5 - 34 unit/L Final     Alanine Aminotransferase   Date Value Ref Range Status   07/24/2022 18 0 - 55 unit/L Final       2/8/19 HOLTER            2/9/23 CARDs OV   ASSESSMENT/PLAN:     1. Primary hypertension  -/85 today continue aldactone 25 will increase entresto to 49-51 BID     2. Chronic Atrial Fibrillation  -Continue Xarelto 20 mg daily. CHADSVASc 4.   -Continue Toprol XL 25 mg BID. Rate controlled afib today     3. Tobacco user  -Cessation recommend.      4. NICM/HFrEF (heart failure with reduced ejection fraction)  -Probable NICM. Stage C. Class II.   -Increasing entresto to 49-51 BID today  -Continue Toprol XL 25 .   -Continue Aldactone 25 mg daily.   -Continue jardiance 10mg qd   -wt 210lb today dry weight is ~200lb instructed to take daily weights in addition to extra dose of furosemide for 5 days (20mg BID furosemide x 5 days)before resuming 20mg qd dosing avoidance of high sodium containing foods discussed     5. Functional MR    -severe, posteriorly directed  -seen by Dr. Polanco and followed in structural clinic no plans for intervention at this time given NYHA class  continued monitoring planned     6. PAD (peripheral arterial disease)  -Plavix 75mg qd  -Continue home simvastatin     6. Arteriosclerosis of coronary artery  -Continue simvastatin 40 qd last LDL was at goal of 49 repeat ordered  -Continue ASA 81 mg daily.      7. Dyslipidemia  -Continue statin therapy. LDL is at goal 49.      8. Nonrheumatic mitral valve regurgitation  -Functional MR secondary to HFrEF.     Danielle Garduno MD      Anesthesia Plan  Type of Anesthesia, risks & benefits discussed:    Anesthesia Type: MAC  Intra-op Monitoring Plan: Standard ASA Monitors  Post Op Pain Control Plan: IV/PO Opioids PRN  Induction:  IV  Informed Consent: Informed  consent signed with the Patient and all parties understand the risks and agree with anesthesia plan.  All questions answered. Patient consented to blood products? No  ASA Score: 4  Day of Surgery Review of History & Physical: H&P Update referred to the surgeon/provider.    Ready For Surgery From Anesthesia Perspective.     .

## 2023-02-16 ENCOUNTER — ANESTHESIA (OUTPATIENT)
Dept: SURGERY | Facility: HOSPITAL | Age: 68
End: 2023-02-16
Payer: MEDICARE

## 2023-02-24 ENCOUNTER — DOCUMENTATION ONLY (OUTPATIENT)
Dept: CARDIOLOGY | Facility: HOSPITAL | Age: 68
End: 2023-02-24
Payer: MEDICAID

## 2023-02-24 NOTE — PROGRESS NOTES
Preoperative cardiovascular risk assessment    Pt needs to undergo cataract surgery on 3/2/2023. Pt has non ischemic cardiomyopathy with known EF 40%. He is able to perform 4 METs without cardiovascular complaints.  He is at moderate risk for a low risk procedure.     Dayanna Johnson MD  Cardiology staff

## 2023-02-28 NOTE — PROGRESS NOTES
PAT call completed with patient.    Has transportation arranged for DOS.    ETOH   beer x weekly none in the last 2 days    Smoke 0.25 ppd x 40 years  10ppy    Drug Marijuana weekly  none in two weeks    Out of Advair inhaler waiting for MD to refill    Meds am DOS correct

## 2023-03-02 ENCOUNTER — DOCUMENTATION ONLY (OUTPATIENT)
Dept: OPHTHALMOLOGY | Facility: CLINIC | Age: 68
End: 2023-03-02
Payer: MEDICAID

## 2023-03-02 NOTE — PROGRESS NOTES
Patient's cataract surgery was cancelled on 3/2/23 due to patient being unable to obtain transportation to the hospital. Patient does not have a reliable source of transportation and so has difficulty getting to the hospital and clinic for appointments. We will attempt to arrange for  to provide transportation for the patient in the future.

## 2023-03-02 NOTE — PROGRESS NOTES
I contacted patient via telephone regarding his inability to obtain transportation for his surgery today. I called an additional time per request of Dr. Laughlin. Patient states he has not been able to get in touch with his sister today and that the only way she will bring him to the hospital is if he pays her. I suggested a neighbor or a friend and he replied that his neighbor would require $40.00 to bring him to the hospital.  He stated that if his surgery can be rescheduled within 20 days that he will have the funds to pay his sister for gas money to bring him to the hospital. I told him that I would relay this information to the eye doctor and he thanked me.   I spoke with Yenni Still in surgery to update her, and she stated she would let the MD know.

## 2023-03-03 ENCOUNTER — TELEPHONE (OUTPATIENT)
Dept: OPHTHALMOLOGY | Facility: CLINIC | Age: 68
End: 2023-03-03
Payer: MEDICAID

## 2023-03-03 NOTE — PROGRESS NOTES
I contacted patient via telephone with information regarding surgery reschedule date and transportation for surgery. I provided the trip ID and telephone number. I instructed him to contact [facility] at [phone] to confirm trip information. Patient verbalized understanding and thanked me for calling.

## 2023-03-03 NOTE — TELEPHONE ENCOUNTER
03/03/2023 8:35 AM  Patient cancelled surgery on 03/02/2023 due to no transportation.     03/03/2023 8:39 AM  Called patient to ask if he wanted to reschedule to 03/09/2023. He stated he would like to and that he would talk to his sister about bringing him. He requested that he be the last case of the morning because his sister does not like waking up early. I told him we could do that but warned him that his arrival time would still be early. He stated that he would call us back if his sister could not bring him.    I also talked to him about looking into transportation through his insurance company. He stated he knew about that and would consider it but would prefer his sister bring him.

## 2023-03-08 NOTE — DISCHARGE INSTRUCTIONS
· Pl· Keep follow up appointment tomorrow  at at the [facility]. Resume home medications unless otherwise instructed by your doctor.    · No bending, lifting, stooping or straining until cleared by MD.    · Keep patch on until follow up appointment and while asleep at home to protect your eye.    · May take Tylenol or Ibuprofen for pain or discomfort if no allergies or contraindications.      ` No driving or consuming alcohol for 24 hours after anesthesia.    · Notify MD if you experience:    · Pain that is unrelieved by over the counter medicines    · if you feel increased pressure in your eye or sharp pain in the eye    · you have excessive, colored, or thick drainage coming from eye    · you see curtain-like darkening in the eye, flashes of light, or other sudden vision changes    · if you have any nausea or vomiting    · fever above 100.4F    · The clinics number is [phone]. If it is after business hours or emergency call [phone].  Thanks for choosing [facility]! Have a smooth recovery!

## 2023-03-09 ENCOUNTER — HOSPITAL ENCOUNTER (OUTPATIENT)
Facility: HOSPITAL | Age: 68
Discharge: HOME | End: 2023-03-09
Attending: OPHTHALMOLOGY | Admitting: OPHTHALMOLOGY
Payer: MEDICARE

## 2023-03-09 VITALS
RESPIRATION RATE: 16 BRPM | OXYGEN SATURATION: 97 % | SYSTOLIC BLOOD PRESSURE: 106 MMHG | HEART RATE: 67 BPM | BODY MASS INDEX: 27.97 KG/M2 | WEIGHT: 206.19 LBS | DIASTOLIC BLOOD PRESSURE: 83 MMHG | TEMPERATURE: 98 F

## 2023-03-09 DIAGNOSIS — H25.812 COMBINED FORMS OF AGE-RELATED CATARACT OF LEFT EYE: ICD-10-CM

## 2023-03-09 PROCEDURE — 63600175 PHARM REV CODE 636 W HCPCS: Performed by: NURSE ANESTHETIST, CERTIFIED REGISTERED

## 2023-03-09 PROCEDURE — 37000009 HC ANESTHESIA EA ADD 15 MINS: Performed by: OPHTHALMOLOGY

## 2023-03-09 PROCEDURE — V2632 POST CHMBR INTRAOCULAR LENS: HCPCS | Performed by: OPHTHALMOLOGY

## 2023-03-09 PROCEDURE — 36000706: Performed by: OPHTHALMOLOGY

## 2023-03-09 PROCEDURE — 63600175 PHARM REV CODE 636 W HCPCS

## 2023-03-09 PROCEDURE — 36000707: Performed by: OPHTHALMOLOGY

## 2023-03-09 PROCEDURE — 25000003 PHARM REV CODE 250: Performed by: OPHTHALMOLOGY

## 2023-03-09 PROCEDURE — 71000015 HC POSTOP RECOV 1ST HR: Performed by: OPHTHALMOLOGY

## 2023-03-09 PROCEDURE — 25000003 PHARM REV CODE 250

## 2023-03-09 PROCEDURE — 25000003 PHARM REV CODE 250: Performed by: STUDENT IN AN ORGANIZED HEALTH CARE EDUCATION/TRAINING PROGRAM

## 2023-03-09 PROCEDURE — 71000016 HC POSTOP RECOV ADDL HR: Performed by: OPHTHALMOLOGY

## 2023-03-09 PROCEDURE — 27201423 OPTIME MED/SURG SUP & DEVICES STERILE SUPPLY: Performed by: OPHTHALMOLOGY

## 2023-03-09 PROCEDURE — 37000008 HC ANESTHESIA 1ST 15 MINUTES: Performed by: OPHTHALMOLOGY

## 2023-03-09 PROCEDURE — 25000242 PHARM REV CODE 250 ALT 637 W/ HCPCS

## 2023-03-09 PROCEDURE — 25000003 PHARM REV CODE 250: Performed by: SPECIALIST

## 2023-03-09 PROCEDURE — C1769 GUIDE WIRE: HCPCS | Performed by: OPHTHALMOLOGY

## 2023-03-09 RX ORDER — MIDAZOLAM HYDROCHLORIDE 1 MG/ML
INJECTION INTRAMUSCULAR; INTRAVENOUS
Status: DISCONTINUED | OUTPATIENT
Start: 2023-03-09 | End: 2023-03-09

## 2023-03-09 RX ORDER — DIAZEPAM 5 MG/1
TABLET ORAL
Status: DISCONTINUED
Start: 2023-03-09 | End: 2023-03-09 | Stop reason: HOSPADM

## 2023-03-09 RX ORDER — ONDANSETRON 2 MG/ML
4 INJECTION INTRAMUSCULAR; INTRAVENOUS ONCE
Status: ACTIVE | OUTPATIENT
Start: 2023-03-09

## 2023-03-09 RX ORDER — PROPARACAINE HYDROCHLORIDE 5 MG/ML
1 SOLUTION/ DROPS OPHTHALMIC
Status: DISCONTINUED | OUTPATIENT
Start: 2023-03-09 | End: 2023-03-09 | Stop reason: HOSPADM

## 2023-03-09 RX ORDER — MIDAZOLAM HYDROCHLORIDE 1 MG/ML
INJECTION INTRAMUSCULAR; INTRAVENOUS
Status: COMPLETED
Start: 2023-03-09 | End: 2023-03-09

## 2023-03-09 RX ORDER — DIAZEPAM 5 MG/1
10 TABLET ORAL ONCE
Status: COMPLETED | OUTPATIENT
Start: 2023-03-09 | End: 2023-03-09

## 2023-03-09 RX ORDER — OXYCODONE AND ACETAMINOPHEN 5; 325 MG/1; MG/1
2 TABLET ORAL ONCE
Status: DISCONTINUED | OUTPATIENT
Start: 2023-03-09 | End: 2024-03-18

## 2023-03-09 RX ORDER — IPRATROPIUM BROMIDE AND ALBUTEROL SULFATE 2.5; .5 MG/3ML; MG/3ML
3 SOLUTION RESPIRATORY (INHALATION) ONCE AS NEEDED
Status: DISCONTINUED | OUTPATIENT
Start: 2023-03-09 | End: 2024-03-19

## 2023-03-09 RX ORDER — SODIUM CHLORIDE 9 MG/ML
INJECTION, SOLUTION INTRAVENOUS CONTINUOUS
Status: ACTIVE | OUTPATIENT
Start: 2023-03-09

## 2023-03-09 RX ORDER — HYDROMORPHONE HYDROCHLORIDE 1 MG/ML
0.5 INJECTION, SOLUTION INTRAMUSCULAR; INTRAVENOUS; SUBCUTANEOUS EVERY 5 MIN PRN
Status: DISCONTINUED | OUTPATIENT
Start: 2023-03-09 | End: 2024-03-20

## 2023-03-09 RX ORDER — CYCLOPENTOLATE HYDROCHLORIDE 10 MG/ML
1 SOLUTION/ DROPS OPHTHALMIC
Status: DISCONTINUED | OUTPATIENT
Start: 2023-03-09 | End: 2023-03-09 | Stop reason: HOSPADM

## 2023-03-09 RX ORDER — TROPICAMIDE 10 MG/ML
1 SOLUTION/ DROPS OPHTHALMIC
Status: DISCONTINUED | OUTPATIENT
Start: 2023-03-09 | End: 2023-03-09 | Stop reason: HOSPADM

## 2023-03-09 RX ORDER — ACETAMINOPHEN 325 MG/1
650 TABLET ORAL EVERY 4 HOURS PRN
Status: DISCONTINUED | OUTPATIENT
Start: 2023-03-09 | End: 2023-03-09 | Stop reason: HOSPADM

## 2023-03-09 RX ORDER — FENTANYL CITRATE 50 UG/ML
INJECTION, SOLUTION INTRAMUSCULAR; INTRAVENOUS
Status: DISCONTINUED | OUTPATIENT
Start: 2023-03-09 | End: 2023-03-09

## 2023-03-09 RX ORDER — LIDOCAINE HYDROCHLORIDE 10 MG/ML
1 INJECTION, SOLUTION EPIDURAL; INFILTRATION; INTRACAUDAL; PERINEURAL ONCE
Status: ACTIVE | OUTPATIENT
Start: 2023-03-09

## 2023-03-09 RX ORDER — MOXIFLOXACIN 5 MG/ML
SOLUTION/ DROPS OPHTHALMIC
Status: DISCONTINUED | OUTPATIENT
Start: 2023-03-09 | End: 2023-03-09 | Stop reason: HOSPADM

## 2023-03-09 RX ORDER — PREDNISOLONE ACETATE 10 MG/ML
SUSPENSION/ DROPS OPHTHALMIC
Status: DISCONTINUED | OUTPATIENT
Start: 2023-03-09 | End: 2023-03-09 | Stop reason: HOSPADM

## 2023-03-09 RX ORDER — PHENYLEPHRINE HYDROCHLORIDE 25 MG/ML
1 SOLUTION/ DROPS OPHTHALMIC
Status: DISCONTINUED | OUTPATIENT
Start: 2023-03-09 | End: 2023-03-09 | Stop reason: HOSPADM

## 2023-03-09 RX ORDER — IPRATROPIUM BROMIDE AND ALBUTEROL SULFATE 2.5; .5 MG/3ML; MG/3ML
SOLUTION RESPIRATORY (INHALATION)
Status: COMPLETED
Start: 2023-03-09 | End: 2023-03-09

## 2023-03-09 RX ORDER — HYDROMORPHONE HYDROCHLORIDE 1 MG/ML
0.2 INJECTION, SOLUTION INTRAMUSCULAR; INTRAVENOUS; SUBCUTANEOUS EVERY 5 MIN PRN
Status: DISCONTINUED | OUTPATIENT
Start: 2023-03-09 | End: 2024-03-20

## 2023-03-09 RX ORDER — PROCHLORPERAZINE EDISYLATE 5 MG/ML
5 INJECTION INTRAMUSCULAR; INTRAVENOUS ONCE AS NEEDED
Status: ACTIVE | OUTPATIENT
Start: 2023-03-09 | End: 2034-08-05

## 2023-03-09 RX ORDER — MEPERIDINE HYDROCHLORIDE 25 MG/ML
12.5 INJECTION INTRAMUSCULAR; INTRAVENOUS; SUBCUTANEOUS ONCE
Status: ACTIVE | OUTPATIENT
Start: 2023-03-09 | End: 2023-03-10

## 2023-03-09 RX ORDER — DIPHENHYDRAMINE HYDROCHLORIDE 50 MG/ML
25 INJECTION INTRAMUSCULAR; INTRAVENOUS ONCE AS NEEDED
Status: ACTIVE | OUTPATIENT
Start: 2023-03-09 | End: 2034-08-05

## 2023-03-09 RX ORDER — LIDOCAINE HYDROCHLORIDE 20 MG/ML
INJECTION, SOLUTION EPIDURAL; INFILTRATION; INTRACAUDAL; PERINEURAL
Status: DISCONTINUED | OUTPATIENT
Start: 2023-03-09 | End: 2023-03-09 | Stop reason: HOSPADM

## 2023-03-09 RX ADMIN — CYCLOPENTOLATE HYDROCHLORIDE 1 DROP: 10 SOLUTION/ DROPS OPHTHALMIC at 09:03

## 2023-03-09 RX ADMIN — MIDAZOLAM 1 MG: 1 INJECTION INTRAMUSCULAR; INTRAVENOUS at 10:03

## 2023-03-09 RX ADMIN — IPRATROPIUM BROMIDE AND ALBUTEROL SULFATE 3 ML: 2.5; .5 SOLUTION RESPIRATORY (INHALATION) at 10:03

## 2023-03-09 RX ADMIN — TROPICAMIDE 1 DROP: 10 SOLUTION/ DROPS OPHTHALMIC at 09:03

## 2023-03-09 RX ADMIN — PROPARACAINE HYDROCHLORIDE 1 DROP: 5 SOLUTION/ DROPS OPHTHALMIC at 09:03

## 2023-03-09 RX ADMIN — PHENYLEPHRINE HYDROCHLORIDE 1 DROP: 25 SOLUTION/ DROPS OPHTHALMIC at 10:03

## 2023-03-09 RX ADMIN — MIDAZOLAM 1 MG: 1 INJECTION INTRAMUSCULAR; INTRAVENOUS at 11:03

## 2023-03-09 RX ADMIN — SODIUM CHLORIDE: 9 INJECTION, SOLUTION INTRAVENOUS at 10:03

## 2023-03-09 RX ADMIN — PHENYLEPHRINE HYDROCHLORIDE 1 DROP: 25 SOLUTION/ DROPS OPHTHALMIC at 09:03

## 2023-03-09 RX ADMIN — MIDAZOLAM HYDROCHLORIDE 2 MG: 1 INJECTION, SOLUTION INTRAMUSCULAR; INTRAVENOUS at 10:03

## 2023-03-09 RX ADMIN — FENTANYL CITRATE 50 MCG: 50 INJECTION, SOLUTION INTRAMUSCULAR; INTRAVENOUS at 11:03

## 2023-03-09 RX ADMIN — TROPICAMIDE 1 DROP: 10 SOLUTION/ DROPS OPHTHALMIC at 10:03

## 2023-03-09 RX ADMIN — CYCLOPENTOLATE HYDROCHLORIDE 1 DROP: 10 SOLUTION/ DROPS OPHTHALMIC at 10:03

## 2023-03-09 RX ADMIN — FENTANYL CITRATE 50 MCG: 50 INJECTION, SOLUTION INTRAMUSCULAR; INTRAVENOUS at 10:03

## 2023-03-09 RX ADMIN — DIAZEPAM 10 MG: 5 TABLET ORAL at 10:03

## 2023-03-09 NOTE — TRANSFER OF CARE
Anesthesia Transfer of Care Note    Patient: Ran Reyes Jr.    Procedure(s) Performed: Procedure(s) (LRB):  EXTRACTION, CATARACT, WITH IOL INSERTION (Left)    Patient location: OPS    Anesthesia Type: MAC    Transport from OR: Transported from OR on room air with adequate spontaneous ventilation    Post pain: adequate analgesia    Post assessment: no apparent anesthetic complications    Post vital signs: stable    Level of consciousness: awake    Nausea/Vomiting: no nausea/vomiting    Complications: none    Transfer of care protocol was followed

## 2023-03-09 NOTE — BRIEF OP NOTE
Brief Operative Note  Ophthalmology Service    Date of Procedure: 3/9/2023     Attending Physician: Georgi Borja MD    Assistant: Maksim Mcwilliams MD    Pre-Operative Diagnosis: Combined forms of age-related cataract of left eye [H25.812]     Post-Operative Diagnosis: Same as pre-operative diagnosis    Treatments/Procedures:   Procedure(s) (LRB):  EXTRACTION, CATARACT, WITH IOL INSERTION (Left)    Intraoperative Findings: See operative report    Anesthesia: Local/MAC    Complications: None    Estimated Blood Loss: < 5 cc    Specimens: None    -------------------------------------------------------------  Full dictated Operative Report to follow.  -------------------------------------------------------------

## 2023-03-09 NOTE — ANESTHESIA POSTPROCEDURE EVALUATION
Anesthesia Post Evaluation    Patient: Ran Reyes Jr.    Procedure(s) Performed: Procedure(s) (LRB):  EXTRACTION, CATARACT, WITH IOL INSERTION (Left)    Final Anesthesia Type: MAC      Patient location during evaluation: OPS  Patient participation: Yes- Able to Participate  Level of consciousness: awake and alert  Post-procedure vital signs: reviewed and stable  Pain management: adequate  Airway patency: patent    PONV status at discharge: No PONV  Anesthetic complications: no      Cardiovascular status: hemodynamically stable  Respiratory status: unassisted, room air and spontaneous ventilation  Hydration status: euvolemic  Follow-up not needed.          Pain/Riley Score: No data recorded

## 2023-03-09 NOTE — OP NOTE
Operative Note  Ophthalmology Service    Date of Procedure:  3/9/2023    Surgeon:  Maksim Mcwilliams MD    Staff Physician: Georgi Borja MD    Pre-Operative Diagnosis: Cataract OS (left)    Post-Operative Diagnosis: Same as pre-operative diagnosis    Treatments/Procedures Performed:   1. Cataract extraction with phacoemulsification and posterior chamber intraocular lens placement OS (left)    Intraoperative Findings: Cataract    Anesthesia: Monitored anesthesia care.    Complications: None    Estimated Blood Loss: None    Implant: +19.5 D    Implant Name Type Inv. Item Serial No.  Lot No. LRB No. Used Action   enVista preloaded monofocal lens   [id number] [employer] [id number] Left 1 Implanted        Indication for Procedure:   The patient had a history of painless progressive vision loss interfering with activities of daily living.  Risks, benefits, alternatives, and complications were discussed thoroughly with the patient and the patient expressed understanding and a desire to proceed with the procedure. Informed consent was obtained, signed, and witnessed, and placed in the chart prior.     Procedure in detail:  The patient was brought to the operating room and placed in supine position.  A time out was performed including patient's name, date of birth, anticipated procedure, surgical site location, and allergies.  After adequate anesthesia was achieved, the patient was prepped and draped in sterile fashion using topical Betadine.     A sideport blade was used to make a paracentesis wound. Lidocaine was instilled into the anterior chamber. Trypan blue was used to fill the anterior chamber, followed by Amvisc. A 2.4 mm keratome blade was then used to make a clear corneal triplanar wound. A cystotome was used to make a tear in the anterior capsular flap, which was continued around with Utrata forceps to complete a continuous curvilinear capsulorhexis. The lens was noted to spin freely in the bag with  manipulation using the Flo wand. The lens was then removed in a divide-and-conquer manner with the phacoemulsification handpiece. Irrigation and aspiration handpiece was then used to remove the remaining cortical material. Amvisc was then used to fill the capsular bag and the lens as mentioned above was placed in the bag. The I&A was used to remove the remaining Amvisc, and the wounds were hydrated with BSS. The wounds were noted to be watertight. The eye was noted to have a good physiological pressure. The lid speculum was removed under direct visualization. Topical Vigamox and Pred-Forte were placed in the eye. The eye was then covered with a shield and patch. The patient tolerated the procedure well without complications.  The patient was brought to the recovery room in stable condition.

## 2023-03-09 NOTE — H&P
Pre-Operative History & Physical  Ophthalmology      SUBJECTIVE:     History of Present Illness:  Patient is a 67 y.o. male presents with Combined forms of age-related cataract of left eye [H25.812].    MEDICATIONS:   Facility-Administered Medications Prior to Admission   Medication    sodium chloride 0.9% flush 10 mL    sodium chloride 0.9% flush 10 mL    sodium chloride 0.9% flush 10 mL     PTA Medications   Medication Sig    aspirin (ECOTRIN) 81 MG EC tablet Take 81 mg by mouth once daily.    clopidogreL (PLAVIX) 75 mg tablet Take 75 mg by mouth once daily.    empagliflozin (JARDIANCE) 10 mg tablet Take 10 mg by mouth once daily.    furosemide (LASIX) 20 MG tablet Take 20 mg by mouth.    ipratropium (ATROVENT HFA) 17 mcg/actuation inhaler Inhale into the lungs.    metoprolol succinate (TOPROL-XL) 25 MG 24 hr tablet Take 25 mg by mouth once daily.    rivaroxaban (XARELTO) 20 mg Tab Take 20 mg by mouth.    sacubitriL-valsartan (ENTRESTO) 49-51 mg per tablet Take 1 tablet by mouth 2 (two) times daily.    simvastatin (ZOCOR) 40 MG tablet Take 1 tablet (40 mg total) by mouth every evening.    spironolactone (ALDACTONE) 25 MG tablet Take 25 mg by mouth.    ADVAIR HFA 45-21 mcg/actuation HFAA inhaler Waiting on refill from Doctor    nicotine (NICODERM CQ) 7 mg/24 hr Place 1 patch onto the skin once daily. (Patient not taking: Reported on 2/9/2023)    nicotine polacrilex 4 MG Lozg Take 1 lozenge (4 mg total) by mouth as needed (withdrawl from nicotine). (Patient not taking: Reported on 2/9/2023)       ALLERGIES: Review of patient's allergies indicates:  No Known Allergies    PAST MEDICAL HISTORY:   Past Medical History:   Diagnosis Date    A-fib     Anticoagulant long-term use     Aortic aneurysm     Cataract     CHF (congestive heart failure)     Chronic atrial fibrillation     COPD (chronic obstructive pulmonary disease)     Coronary artery disease     HLD (hyperlipidemia)     Hypertension     Mitral regurgitation      PAD (peripheral artery disease)     Primary open angle glaucoma (POAG)      PAST SURGICAL HISTORY:   Past Surgical History:   Procedure Laterality Date    ATHERECTOMY OF PERIPHERAL VESSEL Left 09/12/2022    LEFT SFA ATHERECTOMY, BALLOON ANGIOPLASTY    Heart Stent N/A     > 10yrs. AGO    INSERTION OF STENT INTO PERIPHERAL VESSEL Right 10/17/2022    RIGHT SFA ATHERECTOMY, BALLOON ANGIOPLASTY, STENT; RIGHT ANTERIOR TIBIAL ARTERY ATHERECTOMY, BALLOON ANGIOPLASTY     PAST FAMILY HISTORY:   Family History   Problem Relation Age of Onset    Cancer Mother     Hypertension Mother     Heart failure Father     Stroke Father     Hypertension Father     No Known Problems Sister      SOCIAL HISTORY:   Social History     Tobacco Use    Smoking status: Every Day     Packs/day: 0.25     Years: 40.00     Pack years: 10.00     Types: Cigarettes    Smokeless tobacco: Never   Substance Use Topics    Alcohol use: Yes     Alcohol/week: 3.0 standard drinks     Types: 3 Cans of beer per week     Comment: socially    Drug use: Yes     Frequency: 1.0 times per week     Types: Marijuana     Comment: no use in over 2 months        MENTAL STATUS: Alert    REVIEW OF SYSTEMS: Negative    OBJECTIVE:     Vital Signs (Most Recent)  Temp: 97.5 °F (36.4 °C) (03/09/23 0948)  Pulse: (!) 59 (03/09/23 0948)  BP: 136/80 (03/09/23 0955)  SpO2: 100 % (03/09/23 0948)    Physical Exam:  General: NAD  HEENT: Cataract, left eye  Lungs: Adequate respirations, symmetrical movements, non-labored  Heart: Intact distal pulses  Abdomen: Soft, nondistended, nontender    ASSESSMENT/PLAN:     Patient is a 67 y.o. male with Combined forms of age-related cataract of left eye [H25.812].    - Plan for surgical correction with cataract extraction with intraocular lens implantation of left eye.   - Allergies reviewed: Review of patient's allergies indicates:  No Known Allergies  - Risks/benefits/alternatives of the procedure including, but not limited to scarring, bleeding,  infection, loss or decreased vision, and/or need for possible repeat surgery discussed with the patient and family.  - Informed consent obtained prior to surgery and the patient/family voiced good understanding.

## 2023-03-09 NOTE — DISCHARGE SUMMARY
Discharge Summary     SUMMARY     Surgery Date: 3/9/2023     Surgeon(s) and Role:     * Georgi Borja MD - Staff     * Maksim Mcwilliams MD - Resident    Pre-operative Diagnosis:  * Age-related combined cataract of left eye *    Post-operative Diagnosis: Same as pre-operative diagnosis    Procedure(s) (LRB):  EXTRACTION, CATARACT, WITH IOL INSERTION (Left)    Anesthesia: Local MAC    Findings/Key Components:  Cataract OS (left)    Estimated Blood Loss: * No values recorded between 3/9/2023 10:56 AM and 3/9/2023 11:39 AM *    Implant Name Type Inv. Item Serial No.  Lot No. LRB No. Used Action   envista preloaded monofocal lens   [id number] [employer] [id number] Left 1 Implanted            Specimens (From admission, onward)      None              Discharge Note      SUMMARY     Admit Date: 3/9/2023    Attending Physician: Georgi Borja MD     Discharge Physician: Georgi Borja MD    Discharge Date: 3/9/2023     Final Diagnosis: Post-Op Diagnosis Codes:     * Combined forms of age-related cataract of left eye [H25.812]    Hospital Course: Patient was admitted for an outpatient procedure and tolerated the procedure well with no complications.    Disposition: Home or Self Care    Patient Instructions:   Current Discharge Medication List        CONTINUE these medications which have NOT CHANGED    Details   aspirin (ECOTRIN) 81 MG EC tablet Take 81 mg by mouth once daily.      clopidogreL (PLAVIX) 75 mg tablet Take 75 mg by mouth once daily.      empagliflozin (JARDIANCE) 10 mg tablet Take 10 mg by mouth once daily.      furosemide (LASIX) 20 MG tablet Take 20 mg by mouth.      ipratropium (ATROVENT HFA) 17 mcg/actuation inhaler Inhale into the lungs.      metoprolol succinate (TOPROL-XL) 25 MG 24 hr tablet Take 25 mg by mouth once daily.      rivaroxaban (XARELTO) 20 mg Tab Take 20 mg by mouth.      sacubitriL-valsartan (ENTRESTO) 49-51 mg per tablet Take 1 tablet by mouth 2 (two) times daily.  Qty: 60  tablet, Refills: 6      simvastatin (ZOCOR) 40 MG tablet Take 1 tablet (40 mg total) by mouth every evening.  Qty: 30 tablet, Refills: 6      spironolactone (ALDACTONE) 25 MG tablet Take 25 mg by mouth.      ADVAIR HFA 45-21 mcg/actuation HFAA inhaler Waiting on refill from Doctor      nicotine (NICODERM CQ) 7 mg/24 hr Place 1 patch onto the skin once daily.  Qty: 14 patch, Refills: 3    Associated Diagnoses: Tobacco abuse      nicotine polacrilex 4 MG Lozg Take 1 lozenge (4 mg total) by mouth as needed (withdrawl from nicotine).  Qty: 108 lozenge, Refills: 3             Discharge Procedure Orders (must include Diet, Follow-up, Activity)   Discharge Procedure Orders (must include Diet, Follow-up, Activity)   Diet general     Other restrictions (specify):   Order Comments: No bending, straining, stooping, lifting, exertion, or eye rubbing.

## 2023-03-10 ENCOUNTER — CLINICAL SUPPORT (OUTPATIENT)
Dept: OPHTHALMOLOGY | Facility: CLINIC | Age: 68
End: 2023-03-10
Payer: MEDICARE

## 2023-03-10 VITALS — WEIGHT: 206 LBS | HEIGHT: 72 IN | BODY MASS INDEX: 27.9 KG/M2

## 2023-03-10 DIAGNOSIS — Z98.890 POSTOPERATIVE EYE STATE: Primary | ICD-10-CM

## 2023-03-10 PROCEDURE — 99214 OFFICE O/P EST MOD 30 MIN: CPT | Mod: PBBFAC,PO | Performed by: INTERNAL MEDICINE

## 2023-03-10 RX ORDER — FLURBIPROFEN SODIUM 0.3 MG/ML
1 SOLUTION/ DROPS OPHTHALMIC ONCE
COMMUNITY
End: 2024-03-18

## 2023-03-10 RX ORDER — MOXIFLOXACIN 5 MG/ML
1 SOLUTION/ DROPS OPHTHALMIC 3 TIMES DAILY
COMMUNITY
End: 2024-03-18

## 2023-03-10 RX ORDER — TORSEMIDE 10 MG/1
TABLET ORAL
COMMUNITY
Start: 2022-10-13 | End: 2024-03-18

## 2023-03-10 NOTE — PROGRESS NOTES
Assessment /Plan     For exam results, see Encounter Report.    There are no diagnoses linked to this encounter.  OCT mac (6/1/22)  OD:normal foveal contour  OS: no view    OCT RNFL (6/1/22):  OD: 74, green SNI, yellow T  OS: no view    HVF 24-2 (6/1/22):  OD: 1/11 FL, 3% FP, 2% FN, superior defects, inferonasal defect    B scan OS (6/1/22): no RD or masses    Assessment/Plan:  1. Pseudophakia, left eye  - s/p phaco/IOL OS (DOS: 3/9/23; Lens: +19.5 MX60)  - POD #1: Shield removed in office, patient doing well. IOP wnl, wound Eleni neg, IOL in place  - Start:   - Vigamox QID   - Pred Forte QID  - Wear eye shield when sleeping/QHS for the next week  - Wear protective glasses during the day at all times  - No bending, lifting, stooping, straining or eye rubbing  - Endophthalmitis and RD precautions reviewed    2. POAG, moderate OD, unknown OS  - No view OS 2/2 to cataract  - CDR 0.55  - IOP mid teens  - HVF today with superior/nasal defects  - OCT RNFL with temp thinning  - Complete workup after #1 addressed    RTC 1 week for POW CEIOL, sooner as needed

## 2023-03-16 ENCOUNTER — OFFICE VISIT (OUTPATIENT)
Dept: OPHTHALMOLOGY | Facility: CLINIC | Age: 68
End: 2023-03-16
Payer: MEDICAID

## 2023-03-16 VITALS — BODY MASS INDEX: 27.77 KG/M2 | HEIGHT: 72 IN | WEIGHT: 205 LBS

## 2023-03-16 DIAGNOSIS — Z98.890 POSTOPERATIVE EYE STATE: Primary | ICD-10-CM

## 2023-03-16 PROCEDURE — 99214 OFFICE O/P EST MOD 30 MIN: CPT | Mod: PBBFAC,PO | Performed by: STUDENT IN AN ORGANIZED HEALTH CARE EDUCATION/TRAINING PROGRAM

## 2023-03-16 NOTE — PROGRESS NOTES
HPI    RTC 1 week for POW CEIOL OS    Patient c/o eye (OS) blurry, burns, feels like something is in it  Last edited by Maria G Dela Cruz RN on 3/16/2023 11:28 AM.            Assessment /Plan     For exam results, see Encounter Report.    Postoperative eye state                   OCT mac (6/1/22)  OD:normal foveal contour  OS: no view    OCT RNFL (6/1/22):  OD: 74, green SNI, yellow T  OS: no view    HVF 24-2 (6/1/22):  OD: 1/11 FL, 3% FP, 2% FN, superior defects, inferonasal defect    B scan OS (6/1/22): no RD or masses    Assessment/Plan:  1. Pseudophakia, left eye  - s/p phaco/IOL OS (DOS: 3/9/23; Lens: +19.5 MX60)  - POD #1: Shield removed in office, patient doing well. IOP wnl, wound Eleni neg, IOL in place  - POW1 still 3+ AC reaction. Otherwise doing well. Increase PF 8x daily until Sunday then 6x daily then Tuesday 4x daily      2. POAG, moderate OD, unknown OS  - No view OS 2/2 to cataract  - CDR 0.55  - IOP mid teens  - HVF today with superior/nasal defects  - OCT RNFL with temp thinning  - Complete workup after #1 addressed    RTC 1 week for AC check OS

## 2023-03-24 ENCOUNTER — OFFICE VISIT (OUTPATIENT)
Dept: OPHTHALMOLOGY | Facility: CLINIC | Age: 68
End: 2023-03-24
Payer: MEDICARE

## 2023-03-24 VITALS — BODY MASS INDEX: 27.77 KG/M2 | WEIGHT: 205 LBS | HEIGHT: 72 IN

## 2023-03-24 DIAGNOSIS — Z98.890 POSTOPERATIVE EYE STATE: Primary | ICD-10-CM

## 2023-03-24 PROCEDURE — 99214 OFFICE O/P EST MOD 30 MIN: CPT | Mod: PBBFAC,PO | Performed by: STUDENT IN AN ORGANIZED HEALTH CARE EDUCATION/TRAINING PROGRAM

## 2023-03-24 NOTE — PATIENT INSTRUCTIONS
Use pred forte eyedrop 6x daily in the left eye for the next week then go down to 4x daily until we see you in 2 weeks

## 2023-04-11 ENCOUNTER — OFFICE VISIT (OUTPATIENT)
Dept: OPHTHALMOLOGY | Facility: CLINIC | Age: 68
End: 2023-04-11
Payer: MEDICARE

## 2023-04-11 VITALS — WEIGHT: 205 LBS | BODY MASS INDEX: 27.77 KG/M2 | HEIGHT: 72 IN

## 2023-04-11 DIAGNOSIS — Z98.890 POSTOPERATIVE EYE STATE: Primary | ICD-10-CM

## 2023-04-11 PROCEDURE — 99213 OFFICE O/P EST LOW 20 MIN: CPT | Mod: PBBFAC,PO | Performed by: STUDENT IN AN ORGANIZED HEALTH CARE EDUCATION/TRAINING PROGRAM

## 2023-04-11 RX ORDER — PREDNISOLONE ACETATE 10 MG/ML
1 SUSPENSION/ DROPS OPHTHALMIC 3 TIMES DAILY
Qty: 5 ML | Refills: 0 | Status: SHIPPED | OUTPATIENT
Start: 2023-04-11 | End: 2024-03-18

## 2023-04-11 NOTE — PROGRESS NOTES
HPI    Pseudophakia, left eye  Surgery EXTRACTION, CATARACT, WITH IOL INSERTION (Left)3/09/2023   RTC 2 week for AC/IOP, DFE OS  Last edited by Melanie Oh MA on 4/11/2023 11:44 AM.            Assessment /Plan     For exam results, see Encounter Report.    There are no diagnoses linked to this encounter.                   OCT mac (6/1/22)  OD:normal foveal contour  OS: no view    OCT RNFL (6/1/22):  OD: 74, green SNI, yellow T  OS: no view    HVF 24-2 (6/1/22):  OD: 1/11 FL, 3% FP, 2% FN, superior defects, inferonasal defect    B scan OS (6/1/22): no RD or masses    Assessment/Plan:    1. Pseudophakia, left eye  - s/p phaco/IOL OS (DOS: 3/9/23; Lens: +19.5 MX60)  - POD #1: Shield removed in office, patient doing well. IOP wnl, wound Eleni neg, IOL in place  - POW1 still 3+ AC reaction. Otherwise doing well. Increase PF 8x daily until Sunday then 6x daily then Tuesday 4x daily  - POW#2 improving but still with 1+ AC rxn, flare, vision stable. Using PF 8x daily currently. Use PF 6x daily for next week then decrease to 4x daily until next visit. RTC 2 wks  - POM1 4/11/23: doing well. 20/20 -2 [facility]. IOP good. Begin 3-2-1 taper. Refill sent to [facility]. RTC 3-4 weeks mrx/DFE      2. POAG, moderate OD, unknown OS  - No view OS 2/2 to cataract  - CDR 0.55  - IOP mid teens  - HVF today with superior/nasal defects  - OCT RNFL with temp thinning  - Complete workup after #1 addressed    RTC 3-4 weeks mrx/DFE

## 2023-05-31 NOTE — PROGRESS NOTES
Cardiology attending addendum  Patient's case discussed with cardiology fellow Dr. Danielle Garduno. Agree with plan as outlined above.     Dayanna Johnson MD  [facility]iology[facility]

## 2023-06-10 ENCOUNTER — TELEPHONE (OUTPATIENT)
Dept: INTERNAL MEDICINE | Facility: CLINIC | Age: 68
End: 2023-06-10
Payer: MEDICARE

## 2023-06-10 DIAGNOSIS — Z72.0 TOBACCO USE: Primary | ICD-10-CM

## 2023-06-10 NOTE — TELEPHONE ENCOUNTER
----- Message from Mily Aviles LPN sent at 6/9/2023 10:40 AM CDT -----  Regarding: FW: US Abdominal Aorta  Please review message below. Please advise.  ----- Message -----  From: Eloise Kimball  Sent: 6/9/2023  10:36 AM CDT  To: , #  Subject: US Abdominal Aorta                               Good morning,  Since the patient has a diagnosis of I71.40 (Abdominal aortic aneurysm (AAA) without rupture), the test US Abdominal Aorta can be ordered. If the provider wanted to screen the patient for AAA, then it would be the US AAA Screening. Please inform provider to order the test US Abdominal Aorta test for the patient.    Thank you,

## 2023-08-16 LAB
INR PPP: 1.7
PROTHROMBIN TIME: 19.1 SECONDS (ref 11.4–14)

## 2023-09-11 ENCOUNTER — HOSPITAL ENCOUNTER (EMERGENCY)
Facility: HOSPITAL | Age: 68
Discharge: HOME | End: 2023-09-11
Attending: INTERNAL MEDICINE
Payer: MEDICARE

## 2023-09-11 VITALS
TEMPERATURE: 99 F | DIASTOLIC BLOOD PRESSURE: 69 MMHG | OXYGEN SATURATION: 99 % | HEIGHT: 73 IN | HEART RATE: 59 BPM | RESPIRATION RATE: 20 BRPM | WEIGHT: 194 LBS | BODY MASS INDEX: 25.71 KG/M2 | SYSTOLIC BLOOD PRESSURE: 110 MMHG

## 2023-09-11 DIAGNOSIS — N50.89 SCROTAL SWELLING: ICD-10-CM

## 2023-09-11 DIAGNOSIS — N43.3 HYDROCELE, UNSPECIFIED HYDROCELE TYPE: Primary | ICD-10-CM

## 2023-09-11 DIAGNOSIS — R60.9 EDEMA: ICD-10-CM

## 2023-09-11 LAB
ALBUMIN SERPL-MCNC: 4.1 G/DL (ref 3.4–4.8)
ALBUMIN/GLOB SERPL: 1 RATIO (ref 1.1–2)
ALP SERPL-CCNC: 78 UNIT/L (ref 40–150)
ALT SERPL-CCNC: 10 UNIT/L (ref 0–55)
APPEARANCE UR: ABNORMAL
AST SERPL-CCNC: 18 UNIT/L (ref 5–34)
BACTERIA #/AREA URNS AUTO: ABNORMAL /HPF
BASOPHILS # BLD AUTO: 0.02 X10(3)/MCL
BASOPHILS NFR BLD AUTO: 0.3 %
BILIRUB SERPL-MCNC: 1.1 MG/DL
BILIRUB UR QL STRIP.AUTO: NEGATIVE
BNP BLD-MCNC: 90 PG/ML
BUN SERPL-MCNC: 15 MG/DL (ref 8.4–25.7)
CALCIUM SERPL-MCNC: 9.9 MG/DL (ref 8.8–10)
CHLORIDE SERPL-SCNC: 103 MMOL/L (ref 98–107)
CK MB SERPL-MCNC: 1.1 NG/ML
CK SERPL-CCNC: 53 U/L (ref 30–200)
CO2 SERPL-SCNC: 25 MMOL/L (ref 23–31)
COLOR UR: YELLOW
CREAT SERPL-MCNC: 1.27 MG/DL (ref 0.73–1.18)
EOSINOPHIL # BLD AUTO: 0.05 X10(3)/MCL (ref 0–0.9)
EOSINOPHIL NFR BLD AUTO: 0.7 %
ERYTHROCYTE [DISTWIDTH] IN BLOOD BY AUTOMATED COUNT: 15.3 % (ref 11.5–17)
GFR SERPLBLD CREATININE-BSD FMLA CKD-EPI: >60 MLS/MIN/1.73/M2
GLOBULIN SER-MCNC: 4 GM/DL (ref 2.4–3.5)
GLUCOSE SERPL-MCNC: 74 MG/DL (ref 82–115)
GLUCOSE UR QL STRIP.AUTO: ABNORMAL
HCT VFR BLD AUTO: 46.3 % (ref 42–52)
HGB BLD-MCNC: 15.6 G/DL (ref 14–18)
IMM GRANULOCYTES # BLD AUTO: 0.01 X10(3)/MCL (ref 0–0.04)
IMM GRANULOCYTES NFR BLD AUTO: 0.1 %
KETONES UR QL STRIP.AUTO: NEGATIVE
LEUKOCYTE ESTERASE UR QL STRIP.AUTO: 500
LYMPHOCYTES # BLD AUTO: 1.77 X10(3)/MCL (ref 0.6–4.6)
LYMPHOCYTES NFR BLD AUTO: 23.9 %
MCH RBC QN AUTO: 31.6 PG (ref 27–31)
MCHC RBC AUTO-ENTMCNC: 33.7 G/DL (ref 33–36)
MCV RBC AUTO: 93.7 FL (ref 80–94)
MONOCYTES # BLD AUTO: 0.81 X10(3)/MCL (ref 0.1–1.3)
MONOCYTES NFR BLD AUTO: 10.9 %
NEUTROPHILS # BLD AUTO: 4.75 X10(3)/MCL (ref 2.1–9.2)
NEUTROPHILS NFR BLD AUTO: 64.1 %
NITRITE UR QL STRIP.AUTO: NEGATIVE
NRBC BLD AUTO-RTO: 0 %
PH UR STRIP.AUTO: 5 [PH]
PLATELET # BLD AUTO: 188 X10(3)/MCL (ref 130–400)
PMV BLD AUTO: 11.3 FL (ref 7.4–10.4)
POTASSIUM SERPL-SCNC: 3.6 MMOL/L (ref 3.5–5.1)
PROT SERPL-MCNC: 8.1 GM/DL (ref 5.8–7.6)
PROT UR QL STRIP.AUTO: ABNORMAL
RBC # BLD AUTO: 4.94 X10(6)/MCL (ref 4.7–6.1)
RBC #/AREA URNS AUTO: ABNORMAL /HPF
RBC UR QL AUTO: ABNORMAL
SODIUM SERPL-SCNC: 138 MMOL/L (ref 136–145)
SP GR UR STRIP.AUTO: 1.01 (ref 1–1.03)
SQUAMOUS #/AREA URNS LPF: 1 /HPF
TROPONIN I SERPL-MCNC: 0.01 NG/ML (ref 0–0.04)
UROBILINOGEN UR STRIP-ACNC: NORMAL
WBC # SPEC AUTO: 7.41 X10(3)/MCL (ref 4.5–11.5)
WBC #/AREA URNS AUTO: ABNORMAL /HPF

## 2023-09-11 PROCEDURE — 82553 CREATINE MB FRACTION: CPT | Performed by: NURSE PRACTITIONER

## 2023-09-11 PROCEDURE — 63600175 PHARM REV CODE 636 W HCPCS: Performed by: NURSE PRACTITIONER

## 2023-09-11 PROCEDURE — 82550 ASSAY OF CK (CPK): CPT | Performed by: NURSE PRACTITIONER

## 2023-09-11 PROCEDURE — 81001 URINALYSIS AUTO W/SCOPE: CPT | Performed by: NURSE PRACTITIONER

## 2023-09-11 PROCEDURE — 85025 COMPLETE CBC W/AUTO DIFF WBC: CPT | Performed by: NURSE PRACTITIONER

## 2023-09-11 PROCEDURE — 93005 ELECTROCARDIOGRAM TRACING: CPT

## 2023-09-11 PROCEDURE — 99285 EMERGENCY DEPT VISIT HI MDM: CPT | Mod: 25

## 2023-09-11 PROCEDURE — 80053 COMPREHEN METABOLIC PANEL: CPT | Performed by: NURSE PRACTITIONER

## 2023-09-11 PROCEDURE — 87186 SC STD MICRODIL/AGAR DIL: CPT | Performed by: NURSE PRACTITIONER

## 2023-09-11 PROCEDURE — 83880 ASSAY OF NATRIURETIC PEPTIDE: CPT | Performed by: NURSE PRACTITIONER

## 2023-09-11 PROCEDURE — 84484 ASSAY OF TROPONIN QUANT: CPT | Performed by: NURSE PRACTITIONER

## 2023-09-11 PROCEDURE — 25000003 PHARM REV CODE 250: Performed by: NURSE PRACTITIONER

## 2023-09-11 PROCEDURE — 87088 URINE BACTERIA CULTURE: CPT | Performed by: NURSE PRACTITIONER

## 2023-09-11 RX ORDER — DOXYCYCLINE 100 MG/1
100 CAPSULE ORAL 2 TIMES DAILY
Qty: 20 CAPSULE | Refills: 0 | Status: SHIPPED | OUTPATIENT
Start: 2023-09-11 | End: 2023-09-21

## 2023-09-11 RX ORDER — FUROSEMIDE 10 MG/ML
20 INJECTION INTRAMUSCULAR; INTRAVENOUS
Status: COMPLETED | OUTPATIENT
Start: 2023-09-11 | End: 2023-09-11

## 2023-09-11 RX ORDER — TRAMADOL HYDROCHLORIDE 50 MG/1
50 TABLET ORAL
Status: COMPLETED | OUTPATIENT
Start: 2023-09-11 | End: 2023-09-11

## 2023-09-11 RX ORDER — TRAMADOL HYDROCHLORIDE 50 MG/1
50 TABLET ORAL EVERY 12 HOURS PRN
Qty: 10 TABLET | Refills: 0 | Status: SHIPPED | OUTPATIENT
Start: 2023-09-11 | End: 2023-09-20

## 2023-09-11 RX ADMIN — TRAMADOL HYDROCHLORIDE 50 MG: 50 TABLET, COATED ORAL at 03:09

## 2023-09-11 RX ADMIN — FUROSEMIDE 20 MG: 10 INJECTION, SOLUTION INTRAMUSCULAR; INTRAVENOUS at 03:09

## 2023-09-11 NOTE — DISCHARGE INSTRUCTIONS
Follow up with your primary care physician in 3-5 days for follow up evaluation.  Follow up with Urology Services as discussed in the ED.  Present to nearest ED immediately for worsening symptoms, chest pain, SOB, or swelling to lower extremities.  Take medication as prescribed.

## 2023-09-11 NOTE — ED PROVIDER NOTES
Encounter Date: 9/11/2023       History     Chief Complaint   Patient presents with    Groin Swelling    Back Pain     Pt reports to the ed c/o left-sided groin swelling, lower abdominal pain, and back pain (x)2 days. Thomas feliciano     Pt is a 68 y.o. male who presents to the [facility] ED complaining of scrotal swelling and pain which has been present for the last few days. Pt with Hx of CHF, A-fib, aortic aneurysm, PAD, COPD, CAD, and HTN. Admits to mild SOB at times as well, worse with ambulation. Denies chest pain, nausea, vomiting, or loss of bowel or bladder control. Reports mild pain and swelling to lower extremities as well.       Review of patient's allergies indicates:  No Known Allergies  Past Medical History:   Diagnosis Date    A-fib     Anticoagulant long-term use     Aortic aneurysm     Cataract     CHF (congestive heart failure)     Chronic atrial fibrillation     COPD (chronic obstructive pulmonary disease)     Coronary artery disease     HLD (hyperlipidemia)     Hypertension     Mitral regurgitation     PAD (peripheral artery disease)     Primary open angle glaucoma (POAG)      Past Surgical History:   Procedure Laterality Date    ATHERECTOMY OF PERIPHERAL VESSEL Left 09/12/2022    LEFT SFA ATHERECTOMY, BALLOON ANGIOPLASTY    CATARACT EXTRACTION W/  INTRAOCULAR LENS IMPLANT Left 3/9/2023    Procedure: EXTRACTION, CATARACT, WITH IOL INSERTION;  Surgeon: Georgi Borja MD;  Location: [facility];  Service: Ophthalmology;  Laterality: Left;  19.5  mac    Heart Stent N/A     > 10yrs. AGO    INSERTION OF STENT INTO PERIPHERAL VESSEL Right 10/17/2022    RIGHT SFA ATHERECTOMY, BALLOON ANGIOPLASTY, STENT; RIGHT ANTERIOR TIBIAL ARTERY ATHERECTOMY, BALLOON ANGIOPLASTY     Family History   Problem Relation Age of Onset    Cancer Mother     Hypertension Mother     Heart failure Father     Stroke Father     Hypertension Father     No Known Problems Sister      Social History     Tobacco Use    Smoking status: Every Day      Current packs/day: 0.25     Average packs/day: 0.3 packs/day for 40.0 years (10.0 ttl pk-yrs)     Types: Cigarettes    Smokeless tobacco: Never   Substance Use Topics    Alcohol use: Yes     Alcohol/week: 3.0 standard drinks of alcohol     Types: 3 Cans of beer per week     Comment: socially    Drug use: Yes     Frequency: 1.0 times per week     Types: Marijuana     Comment: no use in over 2 months     Review of Systems   Constitutional:  Negative for chills, diaphoresis, fatigue and fever.   HENT:  Negative for facial swelling, postnasal drip, rhinorrhea, sinus pressure, sinus pain, sore throat and trouble swallowing.    Respiratory:  Positive for shortness of breath. Negative for cough, chest tightness and wheezing.    Cardiovascular:  Negative for chest pain, palpitations and leg swelling.   Gastrointestinal:  Negative for abdominal pain, diarrhea, nausea and vomiting.   Genitourinary:  Positive for scrotal swelling. Negative for dysuria, flank pain, hematuria and urgency.   Musculoskeletal:  Negative for arthralgias, back pain and myalgias.   Skin:  Negative for color change and rash.   Neurological:  Negative for dizziness, syncope, weakness and headaches.   Hematological:  Does not bruise/bleed easily.   All other systems reviewed and are negative.      Physical Exam     Initial Vitals [09/11/23 1121]   BP Pulse Resp Temp SpO2   130/74 90 18 98.5 °F (36.9 °C) 98 %      MAP       --         Physical Exam    Nursing note and vitals reviewed.  Constitutional: Vital signs are normal. He appears well-developed and well-nourished.   HENT:   Head: Normocephalic.   Nose: Nose normal.   Mouth/Throat: Oropharynx is clear and moist.   Eyes: Conjunctivae and EOM are normal. Pupils are equal, round, and reactive to light.   Neck: Neck supple.   Normal range of motion.  Cardiovascular:  Normal rate, regular rhythm, normal heart sounds and intact distal pulses.           Pulmonary/Chest: Effort normal and breath sounds  normal. No respiratory distress. He has no wheezes. He has no rhonchi. He has no rales. He exhibits no tenderness.   Abdominal: Abdomen is soft and flat. Bowel sounds are normal. There is no abdominal tenderness. There is no rebound, no guarding, no tenderness at McBurney's point and negative Marques's sign.   Genitourinary: Right testis shows swelling. Left testis shows swelling and tenderness.    Genitourinary Comments: Scrotum is edematous. No erythema or fluctuance noted.     Musculoskeletal:         General: Normal range of motion.      Cervical back: Normal range of motion and neck supple.     Neurological: He is alert and oriented to person, place, and time. He has normal strength.   Skin: Skin is warm and dry. Capillary refill takes less than 2 seconds.   Psychiatric: He has a normal mood and affect. His behavior is normal. Judgment and thought content normal.         ED Course   Procedures  Labs Reviewed   COMPREHENSIVE METABOLIC PANEL - Abnormal; Notable for the following components:       Result Value    Glucose Level 74 (*)     Creatinine 1.27 (*)     Protein Total 8.1 (*)     Globulin 4.0 (*)     Albumin/Globulin Ratio 1.0 (*)     All other components within normal limits   URINALYSIS, REFLEX TO URINE CULTURE - Abnormal; Notable for the following components:    Appearance, UA Turbid (*)     Protein, UA Trace (*)     Glucose, UA 4+ (*)     Blood, UA 1+ (*)     Leukocyte Esterase,  (*)     WBC, UA 21-50 (*)     Bacteria, UA 2+ (*)     All other components within normal limits   CBC WITH DIFFERENTIAL - Abnormal; Notable for the following components:    MCH 31.6 (*)     MPV 11.3 (*)     All other components within normal limits   CK-MB - Normal   CK - Normal   B-TYPE NATRIURETIC PEPTIDE - Normal   CULTURE, URINE   CBC W/ AUTO DIFFERENTIAL    Narrative:     The following orders were created for panel order CBC Auto Differential.  Procedure                               Abnormality         Status                      ---------                               -----------         ------                     CBC with Differential[[id number]]       Abnormal            Final result                 Please view results for these tests on the individual orders.   TROPONIN I   EXTRA TUBES    Narrative:     The following orders were created for panel order EXTRA TUBES.  Procedure                               Abnormality         Status                     ---------                               -----------         ------                     Light Blue Top Hold[[id number]]                             In process                 Gold Top Hold[[id number]]                                   In process                   Please view results for these tests on the individual orders.   LIGHT BLUE TOP HOLD   GOLD TOP HOLD   EXTRA TUBES    Narrative:     The following orders were created for panel order EXTRA TUBES.  Procedure                               Abnormality         Status                     ---------                               -----------         ------                     Lavender Top Hold[[id number]]                               In process                   Please view results for these tests on the individual orders.   LAVENDER TOP HOLD        ECG Results              EKG 12-lead (In process)  Result time 09/11/23 11:49:54      In process by Interface, Lab In [facility] (09/11/23 11:49:54)                   Narrative:    Test Reason : R60.9,    Vent. Rate : 084 BPM     Atrial Rate : 340 BPM     P-R Int : 000 ms          QRS Dur : 092 ms      QT Int : 374 ms       P-R-T Axes : 000 030 125 degrees     QTc Int : 441 ms    Atrial fibrillation  T wave abnormality, consider lateral ischemia  Abnormal ECG  When compared with ECG of 23-JUL-2022 10:52,  T wave inversion no longer evident in Inferior leads  QT has shortened    Referred By: [email or web address]   SELF           Confirmed By:                                   Imaging Results               US Scrotum And Testicles (Final result)  Result time 09/11/23 14:47:30      Final result by Don Mckeon MD (09/11/23 14:47:30)                   Impression:      Challenging study due to very large left hydrocele.  Complex 35 mm area anterior to the left testicle is indeterminate.  This may relate to developing epididymal/tunical fluid collection or could be fluid from the hydrocele interdigitating into a portion of the tunica or epididymis.    Extratesticular mass felt much less likely, but given the uncertainty, a 2-3 month follow-up ultrasound can be considered to ensure resolution.    Findings discussed with Jerald Rocha NP at the time of dictation.      Electronically signed by: Don Mckeon  Date:    09/11/2023  Time:    14:47               Narrative:    EXAMINATION:  US SCROTUM AND TESTICLES    CLINICAL HISTORY:  Other specified disorders of the male genital organs    TECHNIQUE:  Gray-scale, color Doppler, and spectral waveform tracings of the scrotum.    COMPARISON:  Ultrasound 02/06/2020    FINDINGS:  Challenging exam due to very large left-sided hydrocele.  No testicular mass identified.  Arterial and venous waveforms documented in both testicles.  Left epididymis not well delineated.  Complex area anterior to the left testicle measuring 35 x 15 x 33 mm.  This is positioned between the testicle and scrotal wall.  No significant internal vascularity on color Doppler.  Complex left hydrocele measures almost 17 cm.  Small volume hydrocele on the right.    Measurements:    - right testicle: 3.3 x 1.4 x 2.3 cm    - left testicle: 2.6 x 1.2 x 1.7 cm                                       X-Ray Chest 1 View (Final result)  Result time 09/11/23 13:12:51      Final result by David Lipscomb MD (09/11/23 13:12:51)                   Impression:      No acute findings in the chest      Electronically signed by: David Lipscomb MD  Date:    09/11/2023  Time:    13:12               Narrative:     EXAMINATION:  XR CHEST 1 VIEW    CLINICAL HISTORY:  Edema, unspecified    COMPARISON:  07/23/2022    FINDINGS:  Single view of the chest shows pulmonary hyperinflation with chronic interstitial thickening bilaterally.  No focal consolidation, pneumothorax or pleural effusion.  Cardiac silhouette is enlarged.  Aorta is partially calcified.                        Final result by David Lipscomb MD (09/11/23 13:12:51)                   Impression:      No acute findings in the chest      Electronically signed by: David Lipscomb MD  Date:    09/11/2023  Time:    13:12               Narrative:    EXAMINATION:  XR CHEST 1 VIEW    CLINICAL HISTORY:  Edema, unspecified    COMPARISON:  07/23/2022    FINDINGS:  Single view of the chest shows pulmonary hyperinflation with chronic interstitial thickening bilaterally.  No focal consolidation, pneumothorax or pleural effusion.  Cardiac silhouette is enlarged.  Aorta is partially calcified.                                       Medications   traMADoL tablet 50 mg (has no administration in time range)   furosemide injection 20 mg (has no administration in time range)     Medical Decision Making  Differential:  Hydrocele  Epididymitis  CHF  Electrolyte imbalance  Anemia      Amount and/or Complexity of Data Reviewed  Labs: ordered.  Radiology: ordered.    Risk  Prescription drug management.               ED Course as of 09/11/23 1455   Mon Sep 11, 2023   1448 2:48 PM Reassessed patient at this time. Reports condition has improved. I have discussed pt diagnostic results with Dr. Tarango, ED physician. Physician recommends a single dose of Lasix while in the ED with pt to follow up with Urology Services for further evaluation. After discussing the US results, physician additionally recommends antibiotic coverage for pt. Discussed with patient all pertinent ED information and results. Discussed diagnosis and treatment plan with patient. Follow up instructions and return to ED  instruction have been given. I have stressed to pt his need to follow up with specialist as well as his PCP as discussed. He is to return to the [facility] ED immediately for worsening swelling, inability to urinate, chest pain, or SOB. All questions and concerns were addressed at this time. Patient voices understanding of information and instructions. Patient is comfortable with plan and discharge. Patient is stable for discharge.    [JA]      ED Course User Index  [JA] Jerald Rocha Jr., FNP                    Clinical Impression:   Final diagnoses:  [R60.9] Edema  [N50.89] Scrotal swelling  [N43.3] Hydrocele, unspecified hydrocele type (Primary)        ED Disposition Condition    Discharge Stable          ED Prescriptions       Medication Sig Dispense Start Date End Date Auth. Provider    doxycycline (VIBRAMYCIN) 100 MG Cap Take 1 capsule (100 mg total) by mouth 2 (two) times daily. for 10 days 20 capsule 9/11/2023 9/21/2023 Jerald Rocha Jr., FNP    traMADoL (ULTRAM) 50 mg tablet Take 1 tablet (50 mg total) by mouth every 12 (twelve) hours as needed for Pain. 10 tablet 9/11/2023 -- Jerald Rocha Jr., FNP          Follow-up Information       Follow up With Specialties Details Why Contact Info    Tiffanie Chase, DO Internal Medicine In 3 days  [address] [facility] [address] [phone]      [facility] Emergency Medicine In 3 days As needed, If symptoms worsen [address] [facility] [address] [phone]    [facility]  Schedule an appointment as soon as possible for a visit in 1 week Follow up with [facility] for evaluation. [address] [address]             Jerald Rocha Jr., FNP  09/11/23 7211

## 2023-09-13 LAB — BACTERIA UR CULT: ABNORMAL

## 2023-09-20 ENCOUNTER — OFFICE VISIT (OUTPATIENT)
Dept: UROLOGY | Facility: CLINIC | Age: 68
End: 2023-09-20
Payer: MEDICARE

## 2023-09-20 VITALS
TEMPERATURE: 98 F | HEIGHT: 72 IN | BODY MASS INDEX: 27.22 KG/M2 | RESPIRATION RATE: 18 BRPM | OXYGEN SATURATION: 99 % | DIASTOLIC BLOOD PRESSURE: 73 MMHG | WEIGHT: 201 LBS | SYSTOLIC BLOOD PRESSURE: 114 MMHG | HEART RATE: 61 BPM

## 2023-09-20 DIAGNOSIS — N43.2 OTHER HYDROCELE: Primary | ICD-10-CM

## 2023-09-20 DIAGNOSIS — R10.32 LEFT LOWER QUADRANT ABDOMINAL PAIN: ICD-10-CM

## 2023-09-20 LAB
BILIRUB SERPL-MCNC: NEGATIVE MG/DL
BLOOD URINE, POC: NORMAL
COLOR, POC UA: YELLOW
GLUCOSE UR QL STRIP: NEGATIVE
KETONES UR QL STRIP: NEGATIVE
LEUKOCYTE ESTERASE URINE, POC: NORMAL
NITRITE, POC UA: NEGATIVE
PH, POC UA: 5.5
PROTEIN, POC: NEGATIVE
SPECIFIC GRAVITY, POC UA: 1.02
UROBILINOGEN, POC UA: 0.2

## 2023-09-20 PROCEDURE — 99215 OFFICE O/P EST HI 40 MIN: CPT | Mod: PBBFAC | Performed by: UROLOGY

## 2023-09-20 PROCEDURE — 99214 PR OFFICE/OUTPT VISIT, EST, LEVL IV, 30-39 MIN: ICD-10-PCS | Mod: S$PBB,,, | Performed by: UROLOGY

## 2023-09-20 PROCEDURE — 3078F DIAST BP <80 MM HG: CPT | Mod: CPTII,,, | Performed by: UROLOGY

## 2023-09-20 PROCEDURE — 1101F PR PT FALLS ASSESS DOC 0-1 FALLS W/OUT INJ PAST YR: ICD-10-PCS | Mod: CPTII,,, | Performed by: UROLOGY

## 2023-09-20 PROCEDURE — 3008F BODY MASS INDEX DOCD: CPT | Mod: CPTII,,, | Performed by: UROLOGY

## 2023-09-20 PROCEDURE — 1159F MED LIST DOCD IN RCRD: CPT | Mod: CPTII,,, | Performed by: UROLOGY

## 2023-09-20 PROCEDURE — 1125F PR PAIN SEVERITY QUANTIFIED, PAIN PRESENT: ICD-10-PCS | Mod: CPTII,,, | Performed by: UROLOGY

## 2023-09-20 PROCEDURE — 1159F PR MEDICATION LIST DOCUMENTED IN MEDICAL RECORD: ICD-10-PCS | Mod: CPTII,,, | Performed by: UROLOGY

## 2023-09-20 PROCEDURE — 3288F PR FALLS RISK ASSESSMENT DOCUMENTED: ICD-10-PCS | Mod: CPTII,,, | Performed by: UROLOGY

## 2023-09-20 PROCEDURE — 1160F RVW MEDS BY RX/DR IN RCRD: CPT | Mod: CPTII,,, | Performed by: UROLOGY

## 2023-09-20 PROCEDURE — 3074F PR MOST RECENT SYSTOLIC BLOOD PRESSURE < 130 MM HG: ICD-10-PCS | Mod: CPTII,,, | Performed by: UROLOGY

## 2023-09-20 PROCEDURE — 3078F PR MOST RECENT DIASTOLIC BLOOD PRESSURE < 80 MM HG: ICD-10-PCS | Mod: CPTII,,, | Performed by: UROLOGY

## 2023-09-20 PROCEDURE — 3008F PR BODY MASS INDEX (BMI) DOCUMENTED: ICD-10-PCS | Mod: CPTII,,, | Performed by: UROLOGY

## 2023-09-20 PROCEDURE — 4010F ACE/ARB THERAPY RXD/TAKEN: CPT | Mod: CPTII,,, | Performed by: UROLOGY

## 2023-09-20 PROCEDURE — 3074F SYST BP LT 130 MM HG: CPT | Mod: CPTII,,, | Performed by: UROLOGY

## 2023-09-20 PROCEDURE — 4010F PR ACE/ARB THEARPY RXD/TAKEN: ICD-10-PCS | Mod: CPTII,,, | Performed by: UROLOGY

## 2023-09-20 PROCEDURE — 81001 URINALYSIS AUTO W/SCOPE: CPT | Mod: PBBFAC | Performed by: UROLOGY

## 2023-09-20 PROCEDURE — 1101F PT FALLS ASSESS-DOCD LE1/YR: CPT | Mod: CPTII,,, | Performed by: UROLOGY

## 2023-09-20 PROCEDURE — 1125F AMNT PAIN NOTED PAIN PRSNT: CPT | Mod: CPTII,,, | Performed by: UROLOGY

## 2023-09-20 PROCEDURE — 99214 OFFICE O/P EST MOD 30 MIN: CPT | Mod: S$PBB,,, | Performed by: UROLOGY

## 2023-09-20 PROCEDURE — 3288F FALL RISK ASSESSMENT DOCD: CPT | Mod: CPTII,,, | Performed by: UROLOGY

## 2023-09-20 PROCEDURE — 1160F PR REVIEW ALL MEDS BY PRESCRIBER/CLIN PHARMACIST DOCUMENTED: ICD-10-PCS | Mod: CPTII,,, | Performed by: UROLOGY

## 2023-09-20 RX ORDER — HYDROCODONE BITARTRATE AND ACETAMINOPHEN 5; 325 MG/1; MG/1
1 TABLET ORAL EVERY 6 HOURS PRN
Qty: 20 TABLET | Refills: 0 | Status: SHIPPED | OUTPATIENT
Start: 2023-09-20 | End: 2024-03-18

## 2023-09-20 RX ORDER — LEVOFLOXACIN 500 MG/1
500 TABLET, FILM COATED ORAL DAILY
Qty: 7 TABLET | Refills: 0 | Status: SHIPPED | OUTPATIENT
Start: 2023-09-20 | End: 2024-03-18

## 2023-09-20 NOTE — PROGRESS NOTES
CC:  Scrotal swelling    HPI:  Ran Reyes Jr. is a 68 y.o. male being seen in consultation for scrotal swelling.  Three weeks ago he noticed swelling in the left scrotum.  It wasn't painful initially.  About ten days ago it became painful but most of the severe pain is in the left lower quadrant and into the left back. He went to the ER on 11 September 2023.  Scrotal ultrasound shows a large hydrocele with a complex area in the left hemiscrotum.  He has continued with pain.  He was given doxycycline and tramadol in the ER.  That has not made a difference in the symptoms or the pain.  He complains of severe pain that is keeping him awake at night.      Urinalysis:  Results for orders placed or performed in visit on 09/20/23   POCT URINE DIPSTICK WITH MICROSCOPE, AUTOMATED   Result Value Ref Range    Color, UA Yellow     Spec Grav UA 1.025     pH, UA 5.5     WBC, UA Moderate     Nitrite, UA Negative     Protein, POC Negative     Glucose, UA Negative     Ketones, UA Negative     Urobilinogen, UA 0.2     Bilirubin, POC Negative     Blood, UA Trace-intact      Microscopic:  1-2 RBC per HPF       Lab Results:  PSA History:    Recent Labs     09/11/23  1230   CREATININE 1.27*      Imaging:  Scrotal ultrasound - 11 September 2023:   Challenging study due to very large left hydrocele.  Complex 35 mm area anterior to the left testicle is indeterminate.  This may relate to developing epididymal/tunical fluid collection or could be fluid from the hydrocele interdigitating into a portion of the tunica or epididymis.   Extratesticular mass felt much less likely, but given the uncertainty, a 2-3 month follow-up ultrasound can be considered to ensure resolution.    Data Review:  Note from referring provider, Obie Tarango MD dated 11 September 2023.  Scrotal ultrasound.     ROS:  All systems reviewed and are negative except as documented in HPI and/or Assessment and Plan.     Patient Active Problem List:     Patient Active  Problem List   Diagnosis    Primary open angle glaucoma (POAG) of right eye, moderate stage    Combined forms of age-related cataract of left eye    Arteriosclerosis of coronary artery    Chronic atrial fibrillation    Congestive heart failure    Dyslipidemia    Hypertension    Tobacco user    PVD (peripheral vascular disease)    VHD (valvular heart disease)    COVID-19    HFrEF (heart failure with reduced ejection fraction)    Nonrheumatic mitral valve regurgitation    Postoperative eye state        Past Medical History:  Past Medical History:   Diagnosis Date    A-fib     Anticoagulant long-term use     Aortic aneurysm     Cataract     CHF (congestive heart failure)     Chronic atrial fibrillation     COPD (chronic obstructive pulmonary disease)     Coronary artery disease     HLD (hyperlipidemia)     Hypertension     Mitral regurgitation     PAD (peripheral artery disease)     Primary open angle glaucoma (POAG)         Past Surgical History:  Past Surgical History:   Procedure Laterality Date    ATHERECTOMY OF PERIPHERAL VESSEL Left 09/12/2022    LEFT SFA ATHERECTOMY, BALLOON ANGIOPLASTY    CATARACT EXTRACTION W/  INTRAOCULAR LENS IMPLANT Left 3/9/2023    Procedure: EXTRACTION, CATARACT, WITH IOL INSERTION;  Surgeon: Georgi Borja MD;  Location: [facility];  Service: Ophthalmology;  Laterality: Left;  19.5  mac    Heart Stent N/A     > 10yrs. AGO    INSERTION OF STENT INTO PERIPHERAL VESSEL Right 10/17/2022    RIGHT SFA ATHERECTOMY, BALLOON ANGIOPLASTY, STENT; RIGHT ANTERIOR TIBIAL ARTERY ATHERECTOMY, BALLOON ANGIOPLASTY        Family History:  Family History   Problem Relation Age of Onset    Cancer Mother     Hypertension Mother     Heart failure Father     Stroke Father     Hypertension Father     No Known Problems Sister         Social History:  Social History     Socioeconomic History    Marital status: Single   Tobacco Use    Smoking status: Every Day     Current packs/day: 0.25     Average packs/day: 0.3  packs/day for 40.0 years (10.0 ttl pk-yrs)     Types: Cigarettes    Smokeless tobacco: Never   Substance and Sexual Activity    Alcohol use: Yes     Alcohol/week: 3.0 standard drinks of alcohol     Types: 3 Cans of beer per week     Comment: socially    Drug use: Yes     Frequency: 1.0 times per week     Types: Marijuana     Comment: no use in over 2 months     Social Determinants of Health     Financial Resource Strain: Low Risk  (11/30/2022)    Overall Financial Resource Strain (CARDIA)     Difficulty of Paying Living Expenses: Not hard at all   Food Insecurity: No Food Insecurity (11/30/2022)    Hunger Vital Sign     Worried About Running Out of Food in the Last Year: Never true     Ran Out of Food in the Last Year: Never true   Transportation Needs: Unmet Transportation Needs (11/30/2022)    PRAPARE - Transportation     Lack of Transportation (Medical): Yes     Lack of Transportation (Non-Medical): Yes   Physical Activity: Inactive (11/30/2022)    Exercise Vital Sign     Days of Exercise per Week: 0 days     Minutes of Exercise per Session: 0 min   Stress: Stress Concern Present (11/30/2022)    [employer] - Occupational Stress Questionnaire     Feeling of Stress : Very much   Social Connections: Socially Isolated (11/30/2022)    Social Connection and Isolation Panel [NHANES]     Frequency of Communication with Friends and Family: Never     Frequency of Social Gatherings with Friends and Family: Never     Attends Denominational Services: More than 4 times per year     Active Member of Clubs or Organizations: No     Attends Club or Organization Meetings: Never     Marital Status: Never    Housing Stability: Low Risk  (11/30/2022)    Housing Stability Vital Sign     Unable to Pay for Housing in the Last Year: No     Number of Places Lived in the Last Year: 1     Unstable Housing in the Last Year: No        Allergies:  Review of patient's allergies indicates:  No Known Allergies      Objective:  Vitals:    09/20/23 1243   BP: 114/73   Pulse: 61   Resp: 18   Temp: 97.9 °F (36.6 °C)     General:  Well developed, well nourished adult male in no acute distress  Abdomen: Soft, nontender, no masses  Extremities:  No clubbing, cyanosis, or edema  Neurologic:  Grossly intact  Musculoskeletal:  Normal tone  Penis:  Circumcised, no lesions  Scrotum:  Large left hydrocele.    Testicles:  Nontender, no masses  Epididymis:  Normal without masses    Assessment:  1. Other hydrocele  - Ambulatory referral/consult to Urology  - POCT URINE DIPSTICK WITH MICROSCOPE, AUTOMATED  - levoFLOXacin (LEVAQUIN) 500 MG tablet; Take 1 tablet (500 mg total) by mouth once daily.  Dispense: 7 tablet; Refill: 0    2. Left lower quadrant abdominal pain  - Ambulatory referral/consult to Urology  - HYDROcodone-acetaminophen (NORCO) 5-325 mg per tablet; Take 1 tablet by mouth every 6 (six) hours as needed for Pain.  Dispense: 20 tablet; Refill: 0  - CT Abdomen Pelvis W Wo Contrast; Future  - Creatinine, serum; Future     Plan:  I am unsure if the hydrocele is the cause of the pain as the pain is more concentrated in the abdomen and there is what sounds like radiation into the left scrotum.  This may be an infected hydrocele so I am changing his to Levaquin as doxycycline is not making a difference.  I recommended that he gets something more supportive as the pulling on the spermatic cord and irritating the nerves may be also causing some pain.  He requests something stronger for pain because tramadol is not helping and was given hydrocodone.  I am going to get a CT scan to evaluate the left lower quadrant as this is really the area where he is having pain.    Follow-up:  After CT.

## 2023-10-18 ENCOUNTER — TELEPHONE (OUTPATIENT)
Dept: UROLOGY | Facility: CLINIC | Age: 68
End: 2023-10-18
Payer: MEDICARE

## 2023-10-18 NOTE — TELEPHONE ENCOUNTER
Thank you!    ----- Message from Yue Stinson sent at 10/18/2023  1:25 PM CDT -----  Regarding: RE: Reschedule Urology Appt  Pt presented in clinic-changed his phone number-I called a to schedule CT 10/25/23 @ 1230pm with 11/25/23 @ 3pm follow up. Thanks   ----- Message -----  From: Diana Philippe RN  Sent: 10/18/2023   9:13 AM CDT  To: Yue Stinson  Subject: Reschedule Urology Appt                          Pt has appt today at 1:30pm but no showed CT scan. Please reschedule appt & remind pt to get CT done beforehand. Thanks!

## 2023-10-23 ENCOUNTER — HOSPITAL ENCOUNTER (EMERGENCY)
Facility: HOSPITAL | Age: 68
Discharge: HOME | End: 2023-10-23
Attending: STUDENT IN AN ORGANIZED HEALTH CARE EDUCATION/TRAINING PROGRAM
Payer: MEDICARE

## 2023-10-23 VITALS
OXYGEN SATURATION: 95 % | SYSTOLIC BLOOD PRESSURE: 111 MMHG | HEART RATE: 64 BPM | TEMPERATURE: 98 F | DIASTOLIC BLOOD PRESSURE: 76 MMHG | RESPIRATION RATE: 18 BRPM

## 2023-10-23 DIAGNOSIS — Z87.09 HISTORY OF COPD: Primary | ICD-10-CM

## 2023-10-23 DIAGNOSIS — R05.9 COUGH: ICD-10-CM

## 2023-10-23 LAB
ALBUMIN SERPL-MCNC: 3.8 G/DL (ref 3.4–4.8)
ALBUMIN/GLOB SERPL: 1 RATIO (ref 1.1–2)
ALP SERPL-CCNC: 85 UNIT/L (ref 40–150)
ALT SERPL-CCNC: 10 UNIT/L (ref 0–55)
AST SERPL-CCNC: 24 UNIT/L (ref 5–34)
BASOPHILS # BLD AUTO: 0.02 X10(3)/MCL
BASOPHILS NFR BLD AUTO: 0.3 %
BILIRUB SERPL-MCNC: 0.8 MG/DL
BNP BLD-MCNC: 144.8 PG/ML
BUN SERPL-MCNC: 10.8 MG/DL (ref 8.4–25.7)
CALCIUM SERPL-MCNC: 9.1 MG/DL (ref 8.8–10)
CHLORIDE SERPL-SCNC: 108 MMOL/L (ref 98–107)
CO2 SERPL-SCNC: 19 MMOL/L (ref 23–31)
CREAT SERPL-MCNC: 0.84 MG/DL (ref 0.73–1.18)
EOSINOPHIL # BLD AUTO: 0.07 X10(3)/MCL (ref 0–0.9)
EOSINOPHIL NFR BLD AUTO: 1.1 %
ERYTHROCYTE [DISTWIDTH] IN BLOOD BY AUTOMATED COUNT: 15.9 % (ref 11.5–17)
FLUAV AG UPPER RESP QL IA.RAPID: NOT DETECTED
FLUBV AG UPPER RESP QL IA.RAPID: NOT DETECTED
GFR SERPLBLD CREATININE-BSD FMLA CKD-EPI: >60 MLS/MIN/1.73/M2
GLOBULIN SER-MCNC: 3.8 GM/DL (ref 2.4–3.5)
GLUCOSE SERPL-MCNC: 61 MG/DL (ref 82–115)
HCT VFR BLD AUTO: 42.8 % (ref 42–52)
HGB BLD-MCNC: 14 G/DL (ref 14–18)
IMM GRANULOCYTES # BLD AUTO: 0.02 X10(3)/MCL (ref 0–0.04)
IMM GRANULOCYTES NFR BLD AUTO: 0.3 %
LYMPHOCYTES # BLD AUTO: 1.32 X10(3)/MCL (ref 0.6–4.6)
LYMPHOCYTES NFR BLD AUTO: 20.4 %
MCH RBC QN AUTO: 30.9 PG (ref 27–31)
MCHC RBC AUTO-ENTMCNC: 32.7 G/DL (ref 33–36)
MCV RBC AUTO: 94.5 FL (ref 80–94)
MONOCYTES # BLD AUTO: 0.59 X10(3)/MCL (ref 0.1–1.3)
MONOCYTES NFR BLD AUTO: 9.1 %
NEUTROPHILS # BLD AUTO: 4.44 X10(3)/MCL (ref 2.1–9.2)
NEUTROPHILS NFR BLD AUTO: 68.8 %
NRBC BLD AUTO-RTO: 0 %
PLATELET # BLD AUTO: 161 X10(3)/MCL (ref 130–400)
PMV BLD AUTO: 11.3 FL (ref 7.4–10.4)
POTASSIUM SERPL-SCNC: 4.5 MMOL/L (ref 3.5–5.1)
PROT SERPL-MCNC: 7.6 GM/DL (ref 5.8–7.6)
RBC # BLD AUTO: 4.53 X10(6)/MCL (ref 4.7–6.1)
SARS-COV-2 RNA RESP QL NAA+PROBE: NOT DETECTED
SODIUM SERPL-SCNC: 140 MMOL/L (ref 136–145)
TROPONIN I SERPL-MCNC: <0.01 NG/ML (ref 0–0.04)
WBC # SPEC AUTO: 6.46 X10(3)/MCL (ref 4.5–11.5)

## 2023-10-23 PROCEDURE — 0240U COVID/FLU A&B PCR: CPT | Performed by: NURSE PRACTITIONER

## 2023-10-23 PROCEDURE — 85025 COMPLETE CBC W/AUTO DIFF WBC: CPT | Performed by: NURSE PRACTITIONER

## 2023-10-23 PROCEDURE — 99285 EMERGENCY DEPT VISIT HI MDM: CPT | Mod: 25

## 2023-10-23 PROCEDURE — 80053 COMPREHEN METABOLIC PANEL: CPT | Performed by: NURSE PRACTITIONER

## 2023-10-23 PROCEDURE — 83880 ASSAY OF NATRIURETIC PEPTIDE: CPT | Performed by: NURSE PRACTITIONER

## 2023-10-23 PROCEDURE — 93005 ELECTROCARDIOGRAM TRACING: CPT

## 2023-10-23 PROCEDURE — 84484 ASSAY OF TROPONIN QUANT: CPT | Performed by: NURSE PRACTITIONER

## 2023-10-23 RX ORDER — FLUTICASONE PROPIONATE 50 MCG
1 SPRAY, SUSPENSION (ML) NASAL 2 TIMES DAILY PRN
Qty: 15 G | Refills: 0 | Status: SHIPPED | OUTPATIENT
Start: 2023-10-23 | End: 2024-03-18

## 2023-10-23 RX ORDER — PROMETHAZINE HYDROCHLORIDE AND DEXTROMETHORPHAN HYDROBROMIDE 6.25; 15 MG/5ML; MG/5ML
5 SYRUP ORAL EVERY 6 HOURS PRN
Qty: 100 ML | Refills: 0 | Status: SHIPPED | OUTPATIENT
Start: 2023-10-23 | End: 2023-10-28

## 2023-10-23 RX ORDER — CETIRIZINE HYDROCHLORIDE 10 MG/1
10 TABLET ORAL DAILY
Qty: 14 TABLET | Refills: 0 | Status: ON HOLD | OUTPATIENT
Start: 2023-10-23 | End: 2023-11-06

## 2023-10-23 NOTE — ED PROVIDER NOTES
Encounter Date: 10/23/2023       History     Chief Complaint   Patient presents with    Cough     Cough, congestion, pleuritic CP     Pt is a 68 y.o. male who presents to the [facility] ED complaining of cough and Lt sided rib pain after prolonged cough. Hx of CHF, A-fib, COPD, PAD, CAD, and HTN. Reports symptoms have been present x 2 days. Denies swelling to lower extremities. Pt reports being scheduled to see Urology Services later this week for testicular swelling. Reports continued swelling at today's ED visit but denies pain to area.      Review of patient's allergies indicates:  No Known Allergies  Past Medical History:   Diagnosis Date    A-fib     Anticoagulant long-term use     Aortic aneurysm     Cataract     CHF (congestive heart failure)     Chronic atrial fibrillation     COPD (chronic obstructive pulmonary disease)     Coronary artery disease     HLD (hyperlipidemia)     Hypertension     Mitral regurgitation     PAD (peripheral artery disease)     Primary open angle glaucoma (POAG)      Past Surgical History:   Procedure Laterality Date    ATHERECTOMY OF PERIPHERAL VESSEL Left 09/12/2022    LEFT SFA ATHERECTOMY, BALLOON ANGIOPLASTY    CATARACT EXTRACTION W/  INTRAOCULAR LENS IMPLANT Left 3/9/2023    Procedure: EXTRACTION, CATARACT, WITH IOL INSERTION;  Surgeon: Georgi Borja MD;  Location: [facility];  Service: Ophthalmology;  Laterality: Left;  19.5  mac    Heart Stent N/A     > 10yrs. AGO    INSERTION OF STENT INTO PERIPHERAL VESSEL Right 10/17/2022    RIGHT SFA ATHERECTOMY, BALLOON ANGIOPLASTY, STENT; RIGHT ANTERIOR TIBIAL ARTERY ATHERECTOMY, BALLOON ANGIOPLASTY     Family History   Problem Relation Age of Onset    Cancer Mother     Hypertension Mother     Heart failure Father     Stroke Father     Hypertension Father     No Known Problems Sister      Social History     Tobacco Use    Smoking status: Every Day     Current packs/day: 0.25     Average packs/day: 0.3 packs/day for 40.0 years (10.0 ttl  pk-yrs)     Types: Cigarettes    Smokeless tobacco: Never   Substance Use Topics    Alcohol use: Yes     Alcohol/week: 3.0 standard drinks of alcohol     Types: 3 Cans of beer per week     Comment: socially    Drug use: Yes     Frequency: 1.0 times per week     Types: Marijuana     Comment: no use in over 2 months     Review of Systems   Constitutional:  Negative for chills, diaphoresis, fatigue and fever.   HENT:  Positive for rhinorrhea. Negative for facial swelling, postnasal drip, sinus pressure, sinus pain, sore throat and trouble swallowing.    Respiratory:  Positive for cough and shortness of breath. Negative for chest tightness and wheezing.    Cardiovascular:  Positive for chest pain. Negative for palpitations and leg swelling.   Gastrointestinal:  Negative for abdominal pain, diarrhea, nausea and vomiting.   Genitourinary:  Positive for scrotal swelling. Negative for dysuria, flank pain, hematuria and urgency.   Musculoskeletal:  Negative for arthralgias, back pain and myalgias.   Skin:  Negative for color change and rash.   Neurological:  Negative for dizziness, syncope, weakness and headaches.   Hematological:  Does not bruise/bleed easily.   All other systems reviewed and are negative.      Physical Exam     Initial Vitals [10/23/23 1351]   BP Pulse Resp Temp SpO2   106/77 (!) 57 (!) 21 98.1 °F (36.7 °C) 99 %      MAP       --         Physical Exam    Nursing note and vitals reviewed.  Constitutional: Vital signs are normal. He appears well-developed and well-nourished.   HENT:   Head: Normocephalic.   Nose: Mucosal edema present.   Mouth/Throat: Oropharynx is clear and moist.   Eyes: Conjunctivae and EOM are normal. Pupils are equal, round, and reactive to light.   Neck: Neck supple.   Normal range of motion.  Cardiovascular:  Normal rate, regular rhythm, normal heart sounds and intact distal pulses.           Pulmonary/Chest: Effort normal and breath sounds normal. No respiratory distress. He has no  wheezes. He has no rhonchi. He has no rales. He exhibits tenderness. He exhibits no bony tenderness, no crepitus, no edema, no deformity and no retraction.     Dry cough present.     Abdominal: Abdomen is soft and flat. Bowel sounds are normal. There is no abdominal tenderness. There is no rebound, no guarding, no tenderness at McBurney's point and negative Marques's sign.   Musculoskeletal:         General: Normal range of motion.      Cervical back: Normal range of motion and neck supple.     Neurological: He is alert and oriented to person, place, and time. He has normal strength.   Skin: Skin is warm and dry. Capillary refill takes less than 2 seconds.   Psychiatric: He has a normal mood and affect. His behavior is normal. Judgment and thought content normal.         ED Course   Procedures  Labs Reviewed   COMPREHENSIVE METABOLIC PANEL - Abnormal; Notable for the following components:       Result Value    Chloride 108 (*)     Carbon Dioxide 19 (*)     Glucose Level 61 (*)     Globulin 3.8 (*)     Albumin/Globulin Ratio 1.0 (*)     All other components within normal limits   B-TYPE NATRIURETIC PEPTIDE - Abnormal; Notable for the following components:    Natriuretic Peptide 144.8 (*)     All other components within normal limits   CBC WITH DIFFERENTIAL - Abnormal; Notable for the following components:    RBC 4.53 (*)     MCV 94.5 (*)     MCHC 32.7 (*)     MPV 11.3 (*)     All other components within normal limits   TROPONIN I - Normal   COVID/FLU A&B PCR - Normal    Narrative:     The Xpert Xpress SARS-CoV-2/FLU/RSV plus is a rapid, multiplexed real-time PCR test intended for the simultaneous qualitative detection and differentiation of SARS-CoV-2, Influenza A, Influenza B, and respiratory syncytial virus (RSV) viral RNA in either nasopharyngeal swab or nasal swab specimens.         CBC W/ AUTO DIFFERENTIAL    Narrative:     The following orders were created for panel order CBC auto differential.  Procedure                                Abnormality         Status                     ---------                               -----------         ------                     CBC with Differential[[id number]]       Abnormal            Final result                 Please view results for these tests on the individual orders.   EXTRA TUBES    Narrative:     The following orders were created for panel order EXTRA TUBES.  Procedure                               Abnormality         Status                     ---------                               -----------         ------                     Light Blue Top Hold[[id number]]                             In process                 Gold Top Hold[[id number]]                                   In process                   Please view results for these tests on the individual orders.   LIGHT BLUE TOP HOLD   GOLD TOP HOLD        ECG Results              EKG 12-lead (Final result)  Result time 10/23/23 14:26:30      Final result by Interface, Lab In [facility] (10/23/23 14:26:30)                   Narrative:    Test Reason : R05.9,    Vent. Rate : 096 BPM     Atrial Rate : 468 BPM     P-R Int : 000 ms          QRS Dur : 094 ms      QT Int : 390 ms       P-R-T Axes : 000 048 111 degrees     QTc Int : 492 ms    Atrial fibrillation  T wave abnormality, consider lateral ischemia  Prolonged QT  Abnormal ECG    Confirmed by Aung Reyes MD ([id number]) on 10/23/2023 2:26:22 PM    Referred By: [email or web address]   SELF           Confirmed By:Aung Reyes MD                                  Imaging Results              X-Ray Chest 1 View (Final result)  Result time 10/23/23 15:14:14      Final result by Mickye West MD (10/23/23 15:14:14)                   Impression:      NO ACUTE CARDIOPULMONARY PROCESS IDENTIFIED.      Electronically signed by: Mickey West  Date:    10/23/2023  Time:    15:14               Narrative:    EXAMINATION:  XR CHEST 1 VIEW    CLINICAL HISTORY:  Cough, unspecified    TECHNIQUE:  One  view    COMPARISON:  September 11, 2023.    FINDINGS:  Cardiopericardial silhouette is within normal limits. Lungs are without dense focal or segmental consolidation, congestive process, pleural effusions or pneumothorax.                                       Medications - No data to display  Medical Decision Making  Differential:  URI  COVID  Influenza  COPD  CHF  Pneumonia  AMI    Amount and/or Complexity of Data Reviewed  Labs: ordered.  Radiology: ordered.    Risk  OTC drugs.  Prescription drug management.         APC / Resident Notes:   I was not physically present during the history, exam or disposition of this patient. I was available at all times for consultation. (Angus)        ED Course as of 10/23/23 2332   Mon Oct 23, 2023   1559 3:59 PM Reassessed patient at this time. Reports condition has improved. Discussed with patient all pertinent ED information and results. Discussed diagnosis and treatment plan with patient. Follow up instructions and return to ED instruction have been given. All questions and concerns were addressed at this time. Patient voices understanding of information and instructions. Patient is comfortable with plan and discharge. Patient is stable for discharge.    [JA]      ED Course User Index  [JA] Jerald Rocha Jr., FNP                    Clinical Impression:   Final diagnoses:  [R05.9] Cough  [Z87.09] History of COPD (Primary)        ED Disposition Condition    Discharge Stable          ED Prescriptions       Medication Sig Dispense Start Date End Date Auth. Provider    cetirizine (ZYRTEC) 10 MG tablet Take 1 tablet (10 mg total) by mouth once daily. for 14 days 14 tablet 10/23/2023 11/6/2023 Jerald Rocha Jr., FNP    promethazine-dextromethorphan (PROMETHAZINE-DM) 6.25-15 mg/5 mL Syrp Take 5 mLs by mouth every 6 (six) hours as needed (cough). 100 mL 10/23/2023 10/28/2023 Jerald Rocha Jr., FNP    fluticasone propionate (FLONASE) 50 mcg/actuation nasal spray 1 spray (50  mcg total) by Each Nostril route 2 (two) times daily as needed for Rhinitis. 15 g 10/23/2023 -- Jerald Rocha Jr., P          Follow-up Information       Follow up With Specialties Details Why Contact Info    Tiffanie Chase,  Internal Medicine In 3 days  [address] [facility] [id number]  [phone]      [facility] Emergency Medicine In 3 days As needed, If symptoms worsen [address] [facility] [id number] [phone]             Jerald Rocha Jr., [facility]  10/23/23 1601       Sagar Greco MD  10/23/23 9179

## 2023-10-23 NOTE — DISCHARGE INSTRUCTIONS
Follow up with your primary care physician in 3-5 days for follow up evaluation.  Return to the [facility] ED immediately for worsening chest pain, SOB, or fever.   Take medication as prescribed.

## 2023-10-25 ENCOUNTER — HOSPITAL ENCOUNTER (OUTPATIENT)
Dept: RADIOLOGY | Facility: HOSPITAL | Age: 68
Discharge: HOME | End: 2023-10-25
Attending: UROLOGY
Payer: MEDICARE

## 2023-10-25 DIAGNOSIS — R10.32 LEFT LOWER QUADRANT ABDOMINAL PAIN: ICD-10-CM

## 2023-10-25 PROCEDURE — 25500020 PHARM REV CODE 255: Performed by: UROLOGY

## 2023-10-25 PROCEDURE — 74178 CT ABD&PLV WO CNTR FLWD CNTR: CPT | Mod: TC

## 2023-10-25 RX ADMIN — IOHEXOL 100 ML: 350 INJECTION, SOLUTION INTRAVENOUS at 12:10

## 2023-11-15 ENCOUNTER — OFFICE VISIT (OUTPATIENT)
Dept: UROLOGY | Facility: CLINIC | Age: 68
End: 2023-11-15
Payer: MEDICARE

## 2023-11-15 VITALS
HEIGHT: 72 IN | SYSTOLIC BLOOD PRESSURE: 125 MMHG | BODY MASS INDEX: 27.09 KG/M2 | WEIGHT: 200 LBS | OXYGEN SATURATION: 99 % | DIASTOLIC BLOOD PRESSURE: 83 MMHG | HEART RATE: 57 BPM | RESPIRATION RATE: 20 BRPM | TEMPERATURE: 98 F

## 2023-11-15 DIAGNOSIS — N52.8 OTHER MALE ERECTILE DYSFUNCTION: ICD-10-CM

## 2023-11-15 DIAGNOSIS — N43.2 OTHER HYDROCELE: Primary | ICD-10-CM

## 2023-11-15 LAB
BILIRUB SERPL-MCNC: NORMAL MG/DL
BLOOD URINE, POC: NORMAL
COLOR, POC UA: YELLOW
GLUCOSE UR QL STRIP: 500
KETONES UR QL STRIP: NORMAL
LEUKOCYTE ESTERASE URINE, POC: NORMAL
NITRITE, POC UA: NORMAL
PH, POC UA: 5.5
PROTEIN, POC: NORMAL
SPECIFIC GRAVITY, POC UA: >=1.005
UROBILINOGEN, POC UA: 0.2

## 2023-11-15 PROCEDURE — 99215 OFFICE O/P EST HI 40 MIN: CPT | Mod: PBBFAC | Performed by: UROLOGY

## 2023-11-15 PROCEDURE — 1159F PR MEDICATION LIST DOCUMENTED IN MEDICAL RECORD: ICD-10-PCS | Mod: CPTII,,, | Performed by: UROLOGY

## 2023-11-15 PROCEDURE — 1126F PR PAIN SEVERITY QUANTIFIED, NO PAIN PRESENT: ICD-10-PCS | Mod: CPTII,,, | Performed by: UROLOGY

## 2023-11-15 PROCEDURE — 4010F PR ACE/ARB THEARPY RXD/TAKEN: ICD-10-PCS | Mod: CPTII,,, | Performed by: UROLOGY

## 2023-11-15 PROCEDURE — 1101F PR PT FALLS ASSESS DOC 0-1 FALLS W/OUT INJ PAST YR: ICD-10-PCS | Mod: CPTII,,, | Performed by: UROLOGY

## 2023-11-15 PROCEDURE — 3288F FALL RISK ASSESSMENT DOCD: CPT | Mod: CPTII,,, | Performed by: UROLOGY

## 2023-11-15 PROCEDURE — 81001 URINALYSIS AUTO W/SCOPE: CPT | Mod: PBBFAC | Performed by: UROLOGY

## 2023-11-15 PROCEDURE — 99214 PR OFFICE/OUTPT VISIT, EST, LEVL IV, 30-39 MIN: ICD-10-PCS | Mod: S$PBB,,, | Performed by: UROLOGY

## 2023-11-15 PROCEDURE — 99214 OFFICE O/P EST MOD 30 MIN: CPT | Mod: S$PBB,,, | Performed by: UROLOGY

## 2023-11-15 PROCEDURE — 1159F MED LIST DOCD IN RCRD: CPT | Mod: CPTII,,, | Performed by: UROLOGY

## 2023-11-15 PROCEDURE — 1126F AMNT PAIN NOTED NONE PRSNT: CPT | Mod: CPTII,,, | Performed by: UROLOGY

## 2023-11-15 PROCEDURE — 3074F SYST BP LT 130 MM HG: CPT | Mod: CPTII,,, | Performed by: UROLOGY

## 2023-11-15 PROCEDURE — 1160F RVW MEDS BY RX/DR IN RCRD: CPT | Mod: CPTII,,, | Performed by: UROLOGY

## 2023-11-15 PROCEDURE — 1160F PR REVIEW ALL MEDS BY PRESCRIBER/CLIN PHARMACIST DOCUMENTED: ICD-10-PCS | Mod: CPTII,,, | Performed by: UROLOGY

## 2023-11-15 PROCEDURE — 3079F DIAST BP 80-89 MM HG: CPT | Mod: CPTII,,, | Performed by: UROLOGY

## 2023-11-15 PROCEDURE — 3079F PR MOST RECENT DIASTOLIC BLOOD PRESSURE 80-89 MM HG: ICD-10-PCS | Mod: CPTII,,, | Performed by: UROLOGY

## 2023-11-15 PROCEDURE — 1101F PT FALLS ASSESS-DOCD LE1/YR: CPT | Mod: CPTII,,, | Performed by: UROLOGY

## 2023-11-15 PROCEDURE — 4010F ACE/ARB THERAPY RXD/TAKEN: CPT | Mod: CPTII,,, | Performed by: UROLOGY

## 2023-11-15 PROCEDURE — 3008F PR BODY MASS INDEX (BMI) DOCUMENTED: ICD-10-PCS | Mod: CPTII,,, | Performed by: UROLOGY

## 2023-11-15 PROCEDURE — 3074F PR MOST RECENT SYSTOLIC BLOOD PRESSURE < 130 MM HG: ICD-10-PCS | Mod: CPTII,,, | Performed by: UROLOGY

## 2023-11-15 PROCEDURE — 3008F BODY MASS INDEX DOCD: CPT | Mod: CPTII,,, | Performed by: UROLOGY

## 2023-11-15 PROCEDURE — 3288F PR FALLS RISK ASSESSMENT DOCUMENTED: ICD-10-PCS | Mod: CPTII,,, | Performed by: UROLOGY

## 2023-11-15 RX ORDER — TADALAFIL 20 MG/1
20 TABLET ORAL DAILY PRN
Qty: 30 TABLET | Refills: 11 | Status: SHIPPED | OUTPATIENT
Start: 2023-11-15 | End: 2024-03-18

## 2023-11-15 NOTE — PROGRESS NOTES
CC:  CT results    HPI:  Ran Reyes Jr. is a 68 y.o. male seen for follow-up of CT.  He has a large hydrocele in the left hemiscrotum.  This began in August.  He was also having pain up into the left lower quadrant.  He states that since then the swelling has gone down somewhat and the pain has improved.  I ordered a CT scan.  Complains of erectile dysfunction which has been present for about five to six months.  He does not really get any erections at all.  He is never tried anything for this.    Urinalysis:  Results for orders placed or performed in visit on 11/15/23   POCT URINE DIPSTICK WITH MICROSCOPE, AUTOMATED   Result Value Ref Range    Color, UA Yellow     Spec Grav UA >=1.005     pH, UA 5.5     WBC, UA moderate     Nitrite, UA neg     Protein, POC neg     Glucose,      Ketones, UA neg     Urobilinogen, UA 0.2     Bilirubin, POC neg     Blood, UA trace-intact      Microscopic: 2-3 WBC, 0-1 RBC per HPF     Lab Results:  PSA History:    [facility]      Recent Labs     10/23/23  1442   CREATININE 0.84       Imaging:  CT  - 25 October 2023:  A hemangioma is seen in the dome of the liver  Reticulonodular infiltrate in the left lower lobe    ROS:  All systems reviewed and are negative except as documented in HPI and/or Assessment and Plan.     Patient Active Problem List:     Patient Active Problem List   Diagnosis    Primary open angle glaucoma (POAG) of right eye, moderate stage    Combined forms of age-related cataract of left eye    Arteriosclerosis of coronary artery    Chronic atrial fibrillation    Congestive heart failure    Dyslipidemia    Hypertension    Tobacco user    PVD (peripheral vascular disease)    VHD (valvular heart disease)    COVID-19    HFrEF (heart failure with reduced ejection fraction)    Nonrheumatic mitral valve regurgitation    Postoperative eye state        Past Medical History:  Past Medical History:   Diagnosis Date    A-fib     Anticoagulant  long-term use     Aortic aneurysm     Cataract     CHF (congestive heart failure)     Chronic atrial fibrillation     COPD (chronic obstructive pulmonary disease)     Coronary artery disease     HLD (hyperlipidemia)     Hypertension     Mitral regurgitation     PAD (peripheral artery disease)     Primary open angle glaucoma (POAG)         Past Surgical History:  Past Surgical History:   Procedure Laterality Date    ATHERECTOMY OF PERIPHERAL VESSEL Left 09/12/2022    LEFT SFA ATHERECTOMY, BALLOON ANGIOPLASTY    CATARACT EXTRACTION W/  INTRAOCULAR LENS IMPLANT Left 3/9/2023    Procedure: EXTRACTION, CATARACT, WITH IOL INSERTION;  Surgeon: Georgi Borja MD;  Location: [facility];  Service: Ophthalmology;  Laterality: Left;  19.5  mac    Heart Stent N/A     > 10yrs. AGO    INSERTION OF STENT INTO PERIPHERAL VESSEL Right 10/17/2022    RIGHT SFA ATHERECTOMY, BALLOON ANGIOPLASTY, STENT; RIGHT ANTERIOR TIBIAL ARTERY ATHERECTOMY, BALLOON ANGIOPLASTY        Family History:  Family History   Problem Relation Age of Onset    Cancer Mother     Hypertension Mother     Heart failure Father     Stroke Father     Hypertension Father     No Known Problems Sister         Social History:  Social History     Socioeconomic History    Marital status: Single   Tobacco Use    Smoking status: Every Day     Current packs/day: 0.25     Average packs/day: 0.3 packs/day for 40.0 years (10.0 ttl pk-yrs)     Types: Cigarettes     Passive exposure: Current    Smokeless tobacco: Never   Substance and Sexual Activity    Alcohol use: Yes     Alcohol/week: 3.0 standard drinks of alcohol     Types: 3 Cans of beer per week     Comment: socially    Drug use: Yes     Frequency: 1.0 times per week     Types: Marijuana     Comment: no use in over 2 months     Social Determinants of Health     Financial Resource Strain: Low Risk  (11/30/2022)    Overall Financial Resource Strain (CARDIA)     Difficulty of Paying Living Expenses: Not hard at all   Food  Insecurity: No Food Insecurity (11/30/2022)    Hunger Vital Sign     Worried About Running Out of Food in the Last Year: Never true     Ran Out of Food in the Last Year: Never true   Transportation Needs: Unmet Transportation Needs (11/30/2022)    PRAPARE - Transportation     Lack of Transportation (Medical): Yes     Lack of Transportation (Non-Medical): Yes   Physical Activity: Inactive (11/30/2022)    Exercise Vital Sign     Days of Exercise per Week: 0 days     Minutes of Exercise per Session: 0 min   Stress: Stress Concern Present (11/30/2022)    [place] citizen [place] of Occupational Health - Occupational Stress Questionnaire     Feeling of Stress : Very much   Social Connections: Socially Isolated (11/30/2022)    Social Connection and Isolation Panel [NHANES]     Frequency of Communication with Friends and Family: Never     Frequency of Social Gatherings with Friends and Family: Never     Attends Jehovah's witness Services: More than 4 times per year     Active Member of Clubs or Organizations: No     Attends Club or Organization Meetings: Never     Marital Status: Never    Housing Stability: Low Risk  (11/30/2022)    Housing Stability Vital Sign     Unable to Pay for Housing in the Last Year: No     Number of Places Lived in the Last Year: 1     Unstable Housing in the Last Year: No        Allergies:  Review of patient's allergies indicates:  No Known Allergies     Objective:  Vitals:    11/15/23 1503   BP: 125/83   Pulse: (!) 57   Resp: 20   Temp: 98.1 °F (36.7 °C)     General:  Well developed, well nourished adult male in no acute distress  Abdomen: Soft, nontender, no masses  Extremities:  No clubbing, cyanosis, or edema  Neurologic:  Grossly intact  Musculoskeletal:  Normal tone      Assessment:  1. Other hydrocele  - POCT URINE DIPSTICK WITH MICROSCOPE, AUTOMATED    2. Other male erectile dysfunction  - tadalafiL (CIALIS) 20 MG Tab; Take 1 tablet (20 mg total) by mouth daily as needed (prior to sexual  activity).  Dispense: 30 tablet; Refill: 11     Plan:  Observation of the hydrocele for now as he is not particularly bothered by it.  We will continue to follow this.  He was given Tadalafil with instructions.    Follow-up:  Six months.

## 2023-12-28 RX ORDER — CLOPIDOGREL BISULFATE 75 MG/1
75 TABLET ORAL DAILY
Qty: 30 TABLET | Refills: 0 | Status: SHIPPED | OUTPATIENT
Start: 2023-12-28 | End: 2024-01-31 | Stop reason: SDUPTHER

## 2023-12-28 RX ORDER — FUROSEMIDE 20 MG/1
20 TABLET ORAL DAILY
Qty: 30 TABLET | Refills: 0 | Status: SHIPPED | OUTPATIENT
Start: 2023-12-28 | End: 2024-01-31 | Stop reason: SDUPTHER

## 2023-12-28 NOTE — TELEPHONE ENCOUNTER
Patient called stating he needs refills on the following meds:  Entresto  Furosemide  Jardiance  Plavix      LOV: 02/2023  NOV: 01/23/24

## 2024-01-04 ENCOUNTER — OFFICE VISIT (OUTPATIENT)
Dept: CARDIOLOGY | Facility: CLINIC | Age: 69
End: 2024-01-04
Payer: MEDICARE

## 2024-01-04 VITALS
HEART RATE: 57 BPM | OXYGEN SATURATION: 99 % | HEIGHT: 71 IN | DIASTOLIC BLOOD PRESSURE: 62 MMHG | SYSTOLIC BLOOD PRESSURE: 91 MMHG | WEIGHT: 203.69 LBS | TEMPERATURE: 98 F | BODY MASS INDEX: 28.52 KG/M2 | RESPIRATION RATE: 20 BRPM

## 2024-01-04 DIAGNOSIS — E78.2 MIXED HYPERLIPIDEMIA: ICD-10-CM

## 2024-01-04 DIAGNOSIS — I50.20 HFREF (HEART FAILURE WITH REDUCED EJECTION FRACTION): ICD-10-CM

## 2024-01-04 DIAGNOSIS — F17.200 SMOKER: ICD-10-CM

## 2024-01-04 DIAGNOSIS — I10 HYPERTENSION, UNSPECIFIED TYPE: ICD-10-CM

## 2024-01-04 DIAGNOSIS — I48.91 ATRIAL FIBRILLATION, UNSPECIFIED TYPE: Primary | ICD-10-CM

## 2024-01-04 DIAGNOSIS — I42.8 NICM (NONISCHEMIC CARDIOMYOPATHY): ICD-10-CM

## 2024-01-04 PROCEDURE — 93005 ELECTROCARDIOGRAM TRACING: CPT

## 2024-01-04 PROCEDURE — 99215 OFFICE O/P EST HI 40 MIN: CPT | Mod: PBBFAC | Performed by: INTERNAL MEDICINE

## 2024-01-04 NOTE — PATIENT INSTRUCTIONS
Follow up with Dr. Wagner for PAD (leg pain) and to determine when you can get off of plavix.  Call [phone] for appointment.    You have been ordered an echo.  Please call centralized scheduling to schedule test. Contact number sheet given.    Follow up with [facility] cardiology in 4 months, appointment below.

## 2024-01-04 NOTE — PROGRESS NOTES
[facility]  Cariology Clinic - Follow Up     Cardiology Attending: Dr. Aung Verduzco MD  Date of Visit: 1/4/2024  Reason for Visit/Chief Complaint:   Chief Complaint    f/u denies chest pain or sob since LV no questions          Subjective:      Ran Reyes Jr. is a 68 y.o. male with a PMH significant for NICM-HFimpEF (LVEF 41%) with functional MR (sever, posteriorly directed), HTN, HLD, longstanding AF, PAD s/p stent to SFA and rt TA, and tobacco abuse who presents for follow up.  He states compliance with all his medications.  Good functional status which he states has improved since last time, can walk around 3-4 blocks before stopping for rest.  Able to do ADLs without difficulty.  Only complaint is states he can not lift more than 10-15 lb for 2 long.  Denies orthopnea, dizziness, syncope, pedal edema, chest pain.  States he tries to maintain a low sodium diet.  Continues to smoke around 6-7 cigarettes a week and drinks around 3-5 beers per week.  Denies any drug use.    Medications:     Current Outpatient Medications   Medication Sig Dispense Refill    clopidogreL (PLAVIX) 75 mg tablet Take 1 tablet (75 mg total) by mouth once daily. 30 tablet 0    empagliflozin (JARDIANCE) 10 mg tablet Take 1 tablet (10 mg total) by mouth once daily. 30 tablet 0    fluticasone propionate (FLONASE) 50 mcg/actuation nasal spray 1 spray (50 mcg total) by Each Nostril route 2 (two) times daily as needed for Rhinitis. 15 g 0    furosemide (LASIX) 20 MG tablet Take 1 tablet (20 mg total) by mouth once daily. 30 tablet 0    rivaroxaban (XARELTO) 20 mg Tab Take 20 mg by mouth.      sacubitriL-valsartan (ENTRESTO) 49-51 mg per tablet Take 1 tablet by mouth 2 (two) times daily. 60 tablet 0    simvastatin (ZOCOR) 40 MG tablet Take 1 tablet (40 mg total) by mouth every evening. 30 tablet 6    spironolactone (ALDACTONE) 25 MG tablet Take 25 mg by mouth.      ADVAIR  HFA 45-21 mcg/actuation HFAA inhaler Waiting on refill from Doctor      aspirin (ECOTRIN) 81 MG EC tablet Take 81 mg by mouth once daily.      cetirizine (ZYRTEC) 10 MG tablet Take 1 tablet (10 mg total) by mouth once daily. for 14 days 14 tablet 0    flurbiprofen (OCUFEN) 0.03 % ophthalmic solution 1 drop once. Do not use while wearing contact lenses      HYDROcodone-acetaminophen (NORCO) 5-325 mg per tablet Take 1 tablet by mouth every 6 (six) hours as needed for Pain. (Patient not taking: Reported on 11/15/2023) 20 tablet 0    ipratropium (ATROVENT HFA) 17 mcg/actuation inhaler Inhale into the lungs.      levoFLOXacin (LEVAQUIN) 500 MG tablet Take 1 tablet (500 mg total) by mouth once daily. (Patient not taking: Reported on 11/15/2023) 7 tablet 0    metoprolol succinate (TOPROL-XL) 25 MG 24 hr tablet Take 25 mg by mouth once daily.      moxifloxacin (VIGAMOX) 0.5 % ophthalmic solution 1 drop 3 (three) times daily.      nicotine (NICODERM CQ) 7 mg/24 hr Place 1 patch onto the skin once daily. (Patient not taking: Reported on 11/15/2023) 14 patch 3    nicotine polacrilex 4 MG Lozg Take 1 lozenge (4 mg total) by mouth as needed (withdrawl from nicotine). (Patient not taking: Reported on 11/15/2023) 108 lozenge 3    prednisoLONE acetate (PRED FORTE) 1 % DrpS Place 1 drop into the left eye 3 (three) times daily. (Patient not taking: Reported on 11/15/2023) 5 mL 0    tadalafiL (CIALIS) 20 MG Tab Take 1 tablet (20 mg total) by mouth daily as needed (prior to sexual activity). (Patient not taking: Reported on 1/4/2024) 30 tablet 11    torsemide (DEMADEX) 10 MG Tab Take by mouth.       No current facility-administered medications for this visit.     Facility-Administered Medications Ordered in Other Visits   Medication Dose Route Frequency Provider Last Rate Last Admin    0.9%  NaCl infusion   Intravenous Continuous Shannon Milligan MD 10 mL/hr at 03/09/23 1020 New Bag at 03/09/23 1020    albuterol-ipratropium 2.5  "mg-0.5 mg/3 mL nebulizer solution 3 mL  3 mL Nebulization Once PRN Shannon Milligan MD        diphenhydrAMINE injection 25 mg  25 mg Intravenous Once PRN Shannon Milligan MD        HYDROmorphone injection 0.2 mg  0.2 mg Intravenous Q5 Min PRN Shannon Milligan MD        HYDROmorphone injection 0.5 mg  0.5 mg Intravenous Q5 Min PRN Shannon Milligan MD        LIDOcaine (PF) 10 mg/ml (1%) injection 10 mg  1 mL Intradermal Once Lanette Ulloa FNP        LIDOcaine (PF) 10 mg/ml (1%) injection 10 mg  1 mL Intradermal Once Shannon Milligan MD        ondansetron injection 4 mg  4 mg Intravenous Once Shannon Milligan MD        oxyCODONE-acetaminophen 5-325 mg per tablet 2 tablet  2 tablet Oral Once Shannon Milligan MD        prochlorperazine injection Soln 5 mg  5 mg Intravenous Once PRN Shannon Milligan MD           I have reviewed and updated the patient's medications, allergies, past medical history, surgical history, social history and family history as needed.    Review of Systems:     ROS  14 point ROS is negative unless as stated above in HPI  Objective:     Wt Readings from Last 3 Encounters:   01/04/24 92.4 kg (203 lb 11.3 oz)   11/15/23 90.7 kg (200 lb)   09/20/23 91.2 kg (201 lb)     Temp Readings from Last 3 Encounters:   01/04/24 97.5 °F (36.4 °C) (Oral)   11/15/23 98.1 °F (36.7 °C) (Oral)   10/23/23 98.1 °F (36.7 °C)     BP Readings from Last 3 Encounters:   01/04/24 91/62   11/15/23 125/83   10/23/23 111/76     Pulse Readings from Last 3 Encounters:   01/04/24 (!) 57   11/15/23 (!) 57   10/23/23 64       Vitals:    01/04/24 1003   BP: 91/62   BP Location: Left arm   Patient Position: Sitting   BP Method: Medium (Automatic)   Pulse: (!) 57   Resp: 20   Temp: 97.5 °F (36.4 °C)   TempSrc: Oral   SpO2: 99%   Weight: 92.4 kg (203 lb 11.3 oz)   Height: 5' 11" (1.803 m)     Body mass index is 28.41 kg/m².    Physical Exam   General: Alert. Responsive. Not in acute distress.  HEENT: " Normocephalic, Atraumatic, No conjunctival icterus  Neck: No JVD  Pulm: CTAB. no exp wheezes. no crackles. symmetrical chest expansion.  Cardio: IRIR. No overt murmur appreciated  Abdomen: BS+, soft, non-distended, non-tender  Extremity: no LE edema. good equal pulses felt bilaterally in all extremities.  Neurologic: AAOx3. Responds to questions/commands appropriately.  MSK: No obvious deformities. Moving all extremities purposefully.  Skin: Warm, dry and without rashes.  Labs:   I have reviewed the following labs below:      Lab Results   Component Value Date    WBC 6.46 10/23/2023    HGB 14.0 10/23/2023    HCT 42.8 10/23/2023     10/23/2023    MCV 94.5 (H) 10/23/2023    RDW 15.9 10/23/2023     Lab Results   Component Value Date     10/23/2023    K 4.5 10/23/2023    CO2 19 (L) 10/23/2023    BUN 10.8 10/23/2023    CALCIUM 9.1 10/23/2023    MG 1.90 07/23/2022    PHOS 3.1 11/17/2021    PHOS 3.1 11/17/2021     Lab Results   Component Value Date    ALBUMIN 3.8 10/23/2023    BILITOT 0.8 10/23/2023    AST 24 10/23/2023    ALKPHOS 85 10/23/2023    ALT 10 10/23/2023     Cholesterol Total   Date Value Ref Range Status   11/16/2021 104 <<=200 mg/dL Final     HDL Cholesterol   Date Value Ref Range Status   11/16/2021 41 35 - 60 mg/dL Final     LDL Cholesterol   Date Value Ref Range Status   11/16/2021 49.00 (L) 50.00 - 140.00 mg/dL Final     Triglyceride   Date Value Ref Range Status   11/16/2021 72 34 - 140 mg/dL Final     Lab Results   Component Value Date    HGBA1C 4.7 11/15/2021    HGBA1C 5.7 10/07/2020    HGBA1C 5.7 01/31/2020    CREATININE 0.84 10/23/2023     Lab Results   Component Value Date    TSH 1.5498 11/16/2021     Lab Results   Component Value Date    IRON 42 (L) 11/17/2021    TIBC 313 11/17/2021    FERRITIN 681.10 (H) 07/24/2022    IODYHYJI20 1,096 (H) 10/07/2020    FOLATE 9.7 10/07/2020     Lab Results   Component Value Date    INR 1.7 (H) 07/23/2022    PROTIME 19.1 (H) 07/23/2022      Lab Results    Component Value Date    TROPONINI <0.010 10/23/2023    TROPONINI 0.013 09/11/2023    TROPONINI 0.029 07/23/2022    TROPONINI 0.013 12/06/2021    TROPONINI 0.023 11/15/2021    .8 (H) 10/23/2023    BNP 90.0 09/11/2023    .3 (H) 12/06/2021     Cardiovascular Studies:   I have reviewed the following studies below:      ADRIENNE on 01/2022   Summary  Severe posteriorly directed mitral regurgitation (ERO 0.5cm2, regurgitant volume 84mL). Mitral regurgitation is type 1a (normal leaflet  motion).  Overall left ventricular systolic function is moderately reduced, LVEF 41%.  Left ventricular cavity is severely enlarged.  Severe left ventricular hypertrophy.  Global hypokinesis noted.  Right ventricular cavity is moderately enlarged. Right ventricular systolic function is moderately reduced.  Atrial septum is intact with no evidence of shunt. Contrast study with agitated saline is mildly positive consistent with intracardiac  shunt at atrial level.  No thrombus or spontaneous contrast noted within the left atrium or left atrial appendage.  Mild-to-moderate tricuspid regurgitation.  No aortic stenosis. No aortic regurgitation present. Aortic valve is tricuspid.  Normal pulmonic valve structure and function.  No evidence of pericardial effusion.      TTE 11/6/21  Mitral Valve  Structurally normal mitral valve.  Moderate to severe central mitral regurgitation.  Aortic Valve  Structurally trileaflet aortic valve.  Tricuspid Valve  Mild tricuspid regurgitation  Pulmonary arterial systolic pressure (PASP) is estimated to be moderately elevated (46-65 mmHg).  Pulmonic Valve  No evidence of any pulmonic regurgitation.  Left Atrium  Moderate to severely dilated left atrium.  Left Ventricle  Left ventricular ejection fraction is measured at approximately 25-30%.  Right Atrium  Moderate to severely dilated right atrium.  Right Ventricle  Right ventricular cavity is mildly enlarged.  Pericardial Effusion  No evidence of a  pericardial effusion.  Pleural Effusion  No evidence of pleural effusion.  Miscellaneous  The IVC is dilated .  IVC respiratory change in dimension < 50% .        Assessment/Plan:   68 y.o. male with the following medical problems:    1. Primary hypertension  -BP 91/62 mmHg , patient is asymptomatic  -continue Entresto 49-51 bid, aldactone 25 qd    2. Chronic Atrial Fibrillation  -Continue Xarelto 20 mg daily. CHADSVASc 4.   -patient is not on a beta blocker, heart rates are in the 50s-60s, he is rate controlled.  Continue holding  BB     3. Tobacco user  -Cessation recommend.      4. NICM/HFimpEF (heart failure with improved ejection fraction)  -Probable NICM. Stage C. Class II.   -Continue entresto to 49-51 BID today  -Continue Aldactone 25 mg daily.   -Continue jardiance 10mg qd   -continue Lasix 20 mg daily     5. Functional MR    -severe, posteriorly directed 2/2 CM  -Repeat TTE ordered  -seen by Dr. Polanco and followed in structural clinic no plans for intervention at this time given NYHA class  continued monitoring planned     6. PAD (peripheral arterial disease) s/p PCI of peripheral arteries  -on Plavix 75mg qd.  Advised to follow up with Dr. Wagner to discuss cessation of Plavix if indicated and switch to ASA daily  -Continue home simvastatin 40mg     7. Dyslipidemia  -Continue statin therapy. LDL is at goal 49.          Return to clinic in 4 months.    Edu Gomez MD  [facility] Cardiology Fellow, PGY-4  [facility]

## 2024-01-05 NOTE — PROGRESS NOTES
Cardiology attending addendum  Patient's case discussed with cardiology fellow Dr. Sofía Gomez. Agree with plan as outlined above.     Dayanna Johnson MD  Cardiology-[facility]

## 2024-01-31 ENCOUNTER — HOSPITAL ENCOUNTER (OUTPATIENT)
Dept: CARDIOLOGY | Facility: HOSPITAL | Age: 69
Discharge: HOME | End: 2024-01-31
Attending: STUDENT IN AN ORGANIZED HEALTH CARE EDUCATION/TRAINING PROGRAM
Payer: MEDICARE

## 2024-01-31 VITALS
BODY MASS INDEX: 28.42 KG/M2 | SYSTOLIC BLOOD PRESSURE: 110 MMHG | DIASTOLIC BLOOD PRESSURE: 77 MMHG | WEIGHT: 203 LBS | HEIGHT: 71 IN

## 2024-01-31 DIAGNOSIS — I50.20 HFREF (HEART FAILURE WITH REDUCED EJECTION FRACTION): Primary | ICD-10-CM

## 2024-01-31 DIAGNOSIS — E78.2 MIXED HYPERLIPIDEMIA: ICD-10-CM

## 2024-01-31 DIAGNOSIS — I10 HYPERTENSION, UNSPECIFIED TYPE: ICD-10-CM

## 2024-01-31 DIAGNOSIS — I73.9 PVD (PERIPHERAL VASCULAR DISEASE): ICD-10-CM

## 2024-01-31 DIAGNOSIS — I50.20 HFREF (HEART FAILURE WITH REDUCED EJECTION FRACTION): ICD-10-CM

## 2024-01-31 DIAGNOSIS — I48.91 ATRIAL FIBRILLATION, UNSPECIFIED TYPE: ICD-10-CM

## 2024-01-31 DIAGNOSIS — I42.8 NICM (NONISCHEMIC CARDIOMYOPATHY): ICD-10-CM

## 2024-01-31 DIAGNOSIS — F17.200 SMOKER: ICD-10-CM

## 2024-01-31 LAB
AV INDEX (PROSTH): 0.56
AV MEAN GRADIENT: 3 MMHG
AV PEAK GRADIENT: 8 MMHG
AV VALVE AREA BY VELOCITY RATIO: 1.73 CM²
AV VALVE AREA: 1.8 CM²
AV VELOCITY RATIO: 0.54
BSA FOR ECHO PROCEDURE: 2.15 M2
CV ECHO LV RWT: 0.39 CM
DOP CALC AO PEAK VEL: 1.39 M/S
DOP CALC AO VTI: 24.4 CM
DOP CALC LVOT AREA: 3.2 CM2
DOP CALC LVOT DIAMETER: 2.02 CM
DOP CALC LVOT PEAK VEL: 0.75 M/S
DOP CALC LVOT STROKE VOLUME: 43.88 CM3
DOP CALCLVOT PEAK VEL VTI: 13.7 CM
ECHO LV POSTERIOR WALL: 1.2 CM (ref 0.6–1.1)
FRACTIONAL SHORTENING: 11 % (ref 28–44)
INTERVENTRICULAR SEPTUM: 1.05 CM (ref 0.6–1.1)
IVC DIAMETER: 1.9 CM
LEFT ATRIUM SIZE: 5.84 CM
LEFT ATRIUM VOLUME INDEX MOD: 55.7 ML/M2
LEFT ATRIUM VOLUME MOD: 118.06 CM3
LEFT INTERNAL DIMENSION IN SYSTOLE: 5.42 CM (ref 2.1–4)
LEFT VENTRICLE DIASTOLIC VOLUME INDEX: 87.96 ML/M2
LEFT VENTRICLE DIASTOLIC VOLUME: 186.48 ML
LEFT VENTRICLE MASS INDEX: 139 G/M2
LEFT VENTRICLE SYSTOLIC VOLUME INDEX: 67.2 ML/M2
LEFT VENTRICLE SYSTOLIC VOLUME: 142.49 ML
LEFT VENTRICULAR INTERNAL DIMENSION IN DIASTOLE: 6.09 CM (ref 3.5–6)
LEFT VENTRICULAR MASS: 295.32 G
LVOT MG: 1.22 MMHG
LVOT MV: 0.51 CM/S
MR PISA EROA: 0.31 CM2
OHS LV EJECTION FRACTION SIMPSONS BIPLANE MOD: 41 %
PISA MRMAX VEL: 4.8 M/S
PISA RADIUS: 0.77 CM
PISA TR MAX VEL: 2.93 M/S
PISA VN NYQUIST MS: 0.4 M/S
PISA VN NYQUIST: 0.4 M/S
RA MAJOR: 6.11 CM
RA PRESSURE ESTIMATED: 3 MMHG
RV TB RVSP: 6 MMHG
TR MAX PG: 34 MMHG
TRICUSPID ANNULAR PLANE SYSTOLIC EXCURSION: 1.84 CM
TV REST PULMONARY ARTERY PRESSURE: 37 MMHG
Z-SCORE OF LEFT VENTRICULAR DIMENSION IN END DIASTOLE: -0.95
Z-SCORE OF LEFT VENTRICULAR DIMENSION IN END SYSTOLE: 2.23

## 2024-01-31 PROCEDURE — 93306 TTE W/DOPPLER COMPLETE: CPT

## 2024-01-31 RX ORDER — SPIRONOLACTONE 25 MG/1
25 TABLET ORAL DAILY
Qty: 90 TABLET | Refills: 3 | Status: SHIPPED | OUTPATIENT
Start: 2024-01-31 | End: 2024-06-14 | Stop reason: SDUPTHER

## 2024-01-31 RX ORDER — CLOPIDOGREL BISULFATE 75 MG/1
75 TABLET ORAL DAILY
Qty: 90 TABLET | Refills: 3 | Status: ON HOLD | OUTPATIENT
Start: 2024-01-31 | End: 2024-04-09 | Stop reason: HOSPADM

## 2024-01-31 RX ORDER — SIMVASTATIN 40 MG/1
40 TABLET, FILM COATED ORAL NIGHTLY
Qty: 90 TABLET | Refills: 3 | Status: ON HOLD | OUTPATIENT
Start: 2024-01-31 | End: 2024-04-09 | Stop reason: HOSPADM

## 2024-01-31 RX ORDER — FUROSEMIDE 20 MG/1
20 TABLET ORAL DAILY
Qty: 90 TABLET | Refills: 3 | Status: ON HOLD | OUTPATIENT
Start: 2024-01-31 | End: 2024-04-09 | Stop reason: HOSPADM

## 2024-01-31 NOTE — TELEPHONE ENCOUNTER
Patient present in clinic stating he needs refills on the following:    Spironolactone  Clopidogrel  Furosemide  Simivastatin  Jardiance  Jessica      LOV:01/04/24  NOV:05/06/24

## 2024-03-17 ENCOUNTER — HOSPITAL ENCOUNTER (INPATIENT)
Facility: HOSPITAL | Age: 69
LOS: 25 days | Discharge: HOME HEALTH | DRG: 853 | End: 2024-04-12
Attending: EMERGENCY MEDICINE | Admitting: INTERNAL MEDICINE
Payer: MEDICARE

## 2024-03-17 DIAGNOSIS — R07.9 CHEST PAIN: ICD-10-CM

## 2024-03-17 DIAGNOSIS — K59.31: Primary | ICD-10-CM

## 2024-03-17 DIAGNOSIS — R06.02 SOB (SHORTNESS OF BREATH): ICD-10-CM

## 2024-03-17 DIAGNOSIS — R60.9 SWELLING: ICD-10-CM

## 2024-03-17 DIAGNOSIS — S30.22XA SCROTAL HEMATOMA: ICD-10-CM

## 2024-03-17 DIAGNOSIS — K62.5 RECTAL BLEEDING: ICD-10-CM

## 2024-03-17 DIAGNOSIS — R00.0 WIDE-COMPLEX TACHYCARDIA: ICD-10-CM

## 2024-03-17 DIAGNOSIS — I25.10 CAD (CORONARY ARTERY DISEASE): ICD-10-CM

## 2024-03-17 DIAGNOSIS — I21.4 NSTEMI (NON-ST ELEVATED MYOCARDIAL INFARCTION): ICD-10-CM

## 2024-03-17 DIAGNOSIS — D64.9 ANEMIA, UNSPECIFIED TYPE: ICD-10-CM

## 2024-03-17 DIAGNOSIS — I48.91 A-FIB: ICD-10-CM

## 2024-03-17 DIAGNOSIS — N43.1 INFECTED HYDROCELE: ICD-10-CM

## 2024-03-17 DIAGNOSIS — I50.9 CHRONIC CONGESTIVE HEART FAILURE, UNSPECIFIED HEART FAILURE TYPE: ICD-10-CM

## 2024-03-17 DIAGNOSIS — R94.31 PROLONGED Q-T INTERVAL ON ECG: ICD-10-CM

## 2024-03-17 DIAGNOSIS — R79.89 ELEVATED TROPONIN: ICD-10-CM

## 2024-03-17 DIAGNOSIS — I48.91 ATRIAL FIBRILLATION WITH RVR: ICD-10-CM

## 2024-03-17 DIAGNOSIS — R06.02 SHORTNESS OF BREATH: ICD-10-CM

## 2024-03-17 DIAGNOSIS — I50.20 HFREF (HEART FAILURE WITH REDUCED EJECTION FRACTION): ICD-10-CM

## 2024-03-17 LAB
ABS NEUT (OLG): ABNORMAL
ALBUMIN SERPL-MCNC: 2.1 G/DL (ref 3.4–4.8)
ALBUMIN/GLOB SERPL: 0.6 RATIO (ref 1.1–2)
ALP SERPL-CCNC: 76 UNIT/L (ref 40–150)
ALT SERPL-CCNC: 27 UNIT/L (ref 0–55)
APTT PPP: 32.4 SECONDS (ref 23.2–33.7)
AST SERPL-CCNC: 112 UNIT/L (ref 5–34)
BILIRUB SERPL-MCNC: 1 MG/DL
BNP BLD-MCNC: 1273.3 PG/ML
BUN SERPL-MCNC: 16.6 MG/DL (ref 8.4–25.7)
BURR CELLS (OLG): ABNORMAL
CALCIUM SERPL-MCNC: 7.4 MG/DL (ref 8.8–10)
CHLORIDE SERPL-SCNC: 101 MMOL/L (ref 98–107)
CO2 SERPL-SCNC: 21 MMOL/L (ref 23–31)
CREAT SERPL-MCNC: 0.99 MG/DL (ref 0.73–1.18)
ERYTHROCYTE [DISTWIDTH] IN BLOOD BY AUTOMATED COUNT: 15.9 % (ref 11.5–17)
GFR SERPLBLD CREATININE-BSD FMLA CKD-EPI: >60 MLS/MIN/1.73/M2
GLOBULIN SER-MCNC: 3.3 GM/DL (ref 2.4–3.5)
GLUCOSE SERPL-MCNC: 99 MG/DL (ref 82–115)
HCT VFR BLD AUTO: 31.8 % (ref 42–52)
HGB BLD-MCNC: 10.3 G/DL (ref 14–18)
INR PPP: 1.6
LACTATE SERPL-SCNC: 1.8 MMOL/L (ref 0.5–2.2)
LACTATE SERPL-SCNC: 2.9 MMOL/L (ref 0.5–2.2)
LYMPHOCYTES NFR BLD MANUAL: 4 %
MACROCYTES BLD QL SMEAR: ABNORMAL
MAGNESIUM SERPL-MCNC: 2.2 MG/DL (ref 1.6–2.6)
MCH RBC QN AUTO: 30.9 PG (ref 27–31)
MCHC RBC AUTO-ENTMCNC: 32.4 G/DL (ref 33–36)
MCV RBC AUTO: 95.5 FL (ref 80–94)
MONOCYTES NFR BLD MANUAL: 4 %
NEUTROPHILS NFR BLD MANUAL: 91 %
NRBC BLD AUTO-RTO: 0 %
PLATELET # BLD AUTO: 141 X10(3)/MCL (ref 130–400)
PLATELETS.RETICULATED NFR BLD AUTO: 12.5 % (ref 0.9–11.2)
PMV BLD AUTO: 11.8 FL (ref 7.4–10.4)
POIKILOCYTOSIS BLD QL SMEAR: ABNORMAL
POTASSIUM SERPL-SCNC: 3.5 MMOL/L (ref 3.5–5.1)
PROT SERPL-MCNC: 5.4 GM/DL (ref 5.8–7.6)
PROTHROMBIN TIME: 19.1 SECONDS (ref 12.5–14.5)
RBC # BLD AUTO: 3.33 X10(6)/MCL (ref 4.7–6.1)
SODIUM SERPL-SCNC: 137 MMOL/L (ref 136–145)
TROPONIN I SERPL-MCNC: 0.06 NG/ML (ref 0–0.04)
WBC # SPEC AUTO: 15.53 X10(3)/MCL (ref 4.5–11.5)

## 2024-03-17 PROCEDURE — 96367 TX/PROPH/DG ADDL SEQ IV INF: CPT

## 2024-03-17 PROCEDURE — 83605 ASSAY OF LACTIC ACID: CPT | Performed by: EMERGENCY MEDICINE

## 2024-03-17 PROCEDURE — 99285 EMERGENCY DEPT VISIT HI MDM: CPT | Mod: 25

## 2024-03-17 PROCEDURE — 25000003 PHARM REV CODE 250: Performed by: EMERGENCY MEDICINE

## 2024-03-17 PROCEDURE — 63600175 PHARM REV CODE 636 W HCPCS

## 2024-03-17 PROCEDURE — 96375 TX/PRO/DX INJ NEW DRUG ADDON: CPT

## 2024-03-17 PROCEDURE — 25500020 PHARM REV CODE 255: Performed by: EMERGENCY MEDICINE

## 2024-03-17 PROCEDURE — 87040 BLOOD CULTURE FOR BACTERIA: CPT | Performed by: EMERGENCY MEDICINE

## 2024-03-17 PROCEDURE — 80053 COMPREHEN METABOLIC PANEL: CPT | Performed by: EMERGENCY MEDICINE

## 2024-03-17 PROCEDURE — 83735 ASSAY OF MAGNESIUM: CPT | Performed by: EMERGENCY MEDICINE

## 2024-03-17 PROCEDURE — 63600175 PHARM REV CODE 636 W HCPCS: Performed by: EMERGENCY MEDICINE

## 2024-03-17 PROCEDURE — 96361 HYDRATE IV INFUSION ADD-ON: CPT

## 2024-03-17 PROCEDURE — 85027 COMPLETE CBC AUTOMATED: CPT | Performed by: EMERGENCY MEDICINE

## 2024-03-17 PROCEDURE — 96365 THER/PROPH/DIAG IV INF INIT: CPT

## 2024-03-17 PROCEDURE — 93005 ELECTROCARDIOGRAM TRACING: CPT

## 2024-03-17 PROCEDURE — 85610 PROTHROMBIN TIME: CPT | Performed by: EMERGENCY MEDICINE

## 2024-03-17 PROCEDURE — 85730 THROMBOPLASTIN TIME PARTIAL: CPT | Performed by: EMERGENCY MEDICINE

## 2024-03-17 PROCEDURE — 84484 ASSAY OF TROPONIN QUANT: CPT | Performed by: EMERGENCY MEDICINE

## 2024-03-17 PROCEDURE — 25000003 PHARM REV CODE 250

## 2024-03-17 PROCEDURE — 83880 ASSAY OF NATRIURETIC PEPTIDE: CPT | Performed by: EMERGENCY MEDICINE

## 2024-03-17 RX ORDER — DILTIAZEM HYDROCHLORIDE 5 MG/ML
10 INJECTION INTRAVENOUS
Status: DISCONTINUED | OUTPATIENT
Start: 2024-03-17 | End: 2024-03-18

## 2024-03-17 RX ORDER — ACETAMINOPHEN 500 MG
1000 TABLET ORAL
Status: COMPLETED | OUTPATIENT
Start: 2024-03-18 | End: 2024-03-17

## 2024-03-17 RX ORDER — FENTANYL CITRATE 50 UG/ML
50 INJECTION, SOLUTION INTRAMUSCULAR; INTRAVENOUS
Status: COMPLETED | OUTPATIENT
Start: 2024-03-18 | End: 2024-03-17

## 2024-03-17 RX ADMIN — SODIUM CHLORIDE, POTASSIUM CHLORIDE, SODIUM LACTATE AND CALCIUM CHLORIDE 1000 ML: 600; 310; 30; 20 INJECTION, SOLUTION INTRAVENOUS at 11:03

## 2024-03-17 RX ADMIN — IOHEXOL 100 ML: 350 INJECTION, SOLUTION INTRAVENOUS at 09:03

## 2024-03-17 RX ADMIN — PIPERACILLIN AND TAZOBACTAM 4.5 G: 4; .5 INJECTION, POWDER, LYOPHILIZED, FOR SOLUTION INTRAVENOUS; PARENTERAL at 11:03

## 2024-03-17 RX ADMIN — VANCOMYCIN HYDROCHLORIDE 1500 MG: 1.5 INJECTION, POWDER, LYOPHILIZED, FOR SOLUTION INTRAVENOUS at 11:03

## 2024-03-17 RX ADMIN — SODIUM CHLORIDE, POTASSIUM CHLORIDE, SODIUM LACTATE AND CALCIUM CHLORIDE 1000 ML: 600; 310; 30; 20 INJECTION, SOLUTION INTRAVENOUS at 09:03

## 2024-03-17 RX ADMIN — FENTANYL CITRATE 50 MCG: 50 INJECTION, SOLUTION INTRAMUSCULAR; INTRAVENOUS at 11:03

## 2024-03-17 RX ADMIN — ACETAMINOPHEN 1000 MG: 500 TABLET ORAL at 11:03

## 2024-03-17 NOTE — Clinical Note
Patient being monitored on zoll. Patient unable to return to bed 807 due to floor not accepting TR bands. Bed request in and bed board spoken to.

## 2024-03-18 ENCOUNTER — ANESTHESIA EVENT (OUTPATIENT)
Dept: SURGERY | Facility: HOSPITAL | Age: 69
DRG: 853 | End: 2024-03-18
Payer: MEDICARE

## 2024-03-18 ENCOUNTER — ANESTHESIA (OUTPATIENT)
Dept: SURGERY | Facility: HOSPITAL | Age: 69
DRG: 853 | End: 2024-03-18
Payer: MEDICARE

## 2024-03-18 PROBLEM — S30.22XA SCROTAL HEMATOMA: Status: ACTIVE | Noted: 2024-03-18

## 2024-03-18 LAB
ABS NEUT (OLG): 11.37 X10(3)/MCL (ref 2.1–9.2)
ALBUMIN SERPL-MCNC: 2 G/DL (ref 3.4–4.8)
ALBUMIN SERPL-MCNC: 2.3 G/DL (ref 3.4–4.8)
ALBUMIN/GLOB SERPL: 0.5 RATIO (ref 1.1–2)
ALBUMIN/GLOB SERPL: 0.6 RATIO (ref 1.1–2)
ALLENS TEST BLOOD GAS (OHS): YES
ALP SERPL-CCNC: 67 UNIT/L (ref 40–150)
ALP SERPL-CCNC: 73 UNIT/L (ref 40–150)
ALT SERPL-CCNC: 16 UNIT/L (ref 0–55)
ALT SERPL-CCNC: 21 UNIT/L (ref 0–55)
APPEARANCE UR: CLEAR
AST SERPL-CCNC: 64 UNIT/L (ref 5–34)
AST SERPL-CCNC: 67 UNIT/L (ref 5–34)
BACTERIA #/AREA URNS AUTO: ABNORMAL /HPF
BASE EXCESS BLD CALC-SCNC: 0.6 MMOL/L (ref -2–2)
BASOPHILS # BLD AUTO: 0.07 X10(3)/MCL
BASOPHILS NFR BLD AUTO: 0.6 %
BILIRUB SERPL-MCNC: 0.8 MG/DL
BILIRUB SERPL-MCNC: 0.8 MG/DL
BILIRUB UR QL STRIP.AUTO: NEGATIVE
BLOOD GAS SAMPLE TYPE (OHS): ABNORMAL
BUN SERPL-MCNC: 16.2 MG/DL (ref 8.4–25.7)
BUN SERPL-MCNC: 16.3 MG/DL (ref 8.4–25.7)
BURR CELLS (OLG): ABNORMAL
CA-I BLD-SCNC: 1.05 MMOL/L (ref 1.12–1.23)
CALCIUM SERPL-MCNC: 7.4 MG/DL (ref 8.8–10)
CALCIUM SERPL-MCNC: 7.4 MG/DL (ref 8.8–10)
CHLORIDE SERPL-SCNC: 99 MMOL/L (ref 98–107)
CHLORIDE SERPL-SCNC: 99 MMOL/L (ref 98–107)
CHOLEST SERPL-MCNC: 46 MG/DL
CHOLEST/HDLC SERPL: ABNORMAL {RATIO}
CK MB SERPL-MCNC: 2 NG/ML
CK SERPL-CCNC: 95 U/L (ref 30–200)
CO2 BLDA-SCNC: 28 MMOL/L
CO2 SERPL-SCNC: 18 MMOL/L (ref 23–31)
CO2 SERPL-SCNC: 28 MMOL/L (ref 23–31)
COHGB MFR BLDA: 2.4 % (ref 0.5–1.5)
COLOR UR AUTO: YELLOW
CREAT SERPL-MCNC: 0.87 MG/DL (ref 0.73–1.18)
CREAT SERPL-MCNC: 0.94 MG/DL (ref 0.73–1.18)
DRAWN BY BLOOD GAS (OHS): ABNORMAL
EOSINOPHIL # BLD AUTO: 0 X10(3)/MCL (ref 0–0.9)
EOSINOPHIL NFR BLD AUTO: 0 %
ERYTHROCYTE [DISTWIDTH] IN BLOOD BY AUTOMATED COUNT: 15.8 % (ref 11.5–17)
ERYTHROCYTE [DISTWIDTH] IN BLOOD BY AUTOMATED COUNT: 16.4 % (ref 11.5–17)
EST. AVERAGE GLUCOSE BLD GHB EST-MCNC: 96.8 MG/DL
FERRITIN SERPL-MCNC: 1101.88 NG/ML (ref 21.81–274.66)
FOLATE SERPL-MCNC: 10 NG/ML (ref 7–31.4)
GFR SERPLBLD CREATININE-BSD FMLA CKD-EPI: >60 MLS/MIN/1.73/M2
GFR SERPLBLD CREATININE-BSD FMLA CKD-EPI: >60 MLS/MIN/1.73/M2
GIANT PLATELETS: ABNORMAL
GLOBULIN SER-MCNC: 3.2 GM/DL (ref 2.4–3.5)
GLOBULIN SER-MCNC: 4.4 GM/DL (ref 2.4–3.5)
GLUCOSE SERPL-MCNC: 159 MG/DL (ref 82–115)
GLUCOSE SERPL-MCNC: 99 MG/DL (ref 82–115)
GLUCOSE UR QL STRIP.AUTO: ABNORMAL
HBA1C MFR BLD: 5 %
HCO3 BLDA-SCNC: 26.5 MMOL/L (ref 22–26)
HCT VFR BLD AUTO: 25.1 % (ref 42–52)
HCT VFR BLD AUTO: 32.4 % (ref 42–52)
HDLC SERPL-MCNC: <5 MG/DL (ref 35–60)
HGB BLD-MCNC: 10.5 G/DL (ref 14–18)
HGB BLD-MCNC: 8.2 G/DL (ref 14–18)
IMM GRANULOCYTES # BLD AUTO: 0.13 X10(3)/MCL (ref 0–0.04)
IMM GRANULOCYTES NFR BLD AUTO: 1.1 %
INHALED O2 CONCENTRATION: 50 %
INSTRUMENT WBC (OLG): 11.97 X10(3)/MCL
IRON SATN MFR SERPL: 18 % (ref 20–50)
IRON SERPL-MCNC: 27 UG/DL (ref 65–175)
KETONES UR QL STRIP.AUTO: NEGATIVE
LACTATE SERPL-SCNC: 3.7 MMOL/L (ref 0.5–2.2)
LDH SERPL-CCNC: 269 U/L (ref 125–220)
LEUKOCYTE ESTERASE UR QL STRIP.AUTO: 500
LYMPHOCYTES # BLD AUTO: 0.61 X10(3)/MCL (ref 0.6–4.6)
LYMPHOCYTES NFR BLD AUTO: 5.2 %
LYMPHOCYTES NFR BLD MANUAL: 0.48 X10(3)/MCL
LYMPHOCYTES NFR BLD MANUAL: 4 %
MACROCYTES BLD QL SMEAR: ABNORMAL
MAGNESIUM SERPL-MCNC: 2.2 MG/DL (ref 1.6–2.6)
MCH RBC QN AUTO: 30.9 PG (ref 27–31)
MCH RBC QN AUTO: 32 PG (ref 27–31)
MCHC RBC AUTO-ENTMCNC: 32.4 G/DL (ref 33–36)
MCHC RBC AUTO-ENTMCNC: 32.7 G/DL (ref 33–36)
MCV RBC AUTO: 95.3 FL (ref 80–94)
MCV RBC AUTO: 98 FL (ref 80–94)
MECH RR (OHS): 18 B/MIN
METHGB MFR BLDA: 0.3 % (ref 0.4–1.5)
MODE (OHS): ABNORMAL
MONOCYTES # BLD AUTO: 0.96 X10(3)/MCL (ref 0.1–1.3)
MONOCYTES NFR BLD AUTO: 8.2 %
MONOCYTES NFR BLD MANUAL: 0.12 X10(3)/MCL (ref 0.1–1.3)
MONOCYTES NFR BLD MANUAL: 1 %
MUCOUS THREADS URNS QL MICRO: ABNORMAL /LPF
NEUTROPHILS # BLD AUTO: 10 X10(3)/MCL (ref 2.1–9.2)
NEUTROPHILS NFR BLD AUTO: 84.9 %
NEUTROPHILS NFR BLD MANUAL: 95 %
NITRITE UR QL STRIP.AUTO: NEGATIVE
NRBC BLD AUTO-RTO: 0 %
NRBC BLD AUTO-RTO: 0 %
O2 HB BLOOD GAS (OHS): 97.3 % (ref 94–97)
OXYHGB MFR BLDA: 9 G/DL (ref 12–16)
PCO2 BLDA: 48 MMHG (ref 35–45)
PEEP RESPIRATORY: 5 CMH2O
PH BLDA: 7.35 [PH] (ref 7.35–7.45)
PH UR STRIP.AUTO: 6.5 [PH]
PHOSPHATE SERPL-MCNC: 3.2 MG/DL (ref 2.3–4.7)
PLATELET # BLD AUTO: 117 X10(3)/MCL (ref 130–400)
PLATELET # BLD AUTO: 136 X10(3)/MCL (ref 130–400)
PLATELET # BLD EST: ABNORMAL 10*3/UL
PLATELETS.RETICULATED NFR BLD AUTO: 12.9 % (ref 0.9–11.2)
PLATELETS.RETICULATED NFR BLD AUTO: 13.9 % (ref 0.9–11.2)
PMV BLD AUTO: 12 FL (ref 7.4–10.4)
PMV BLD AUTO: 13.2 FL (ref 7.4–10.4)
PO2 BLDA: 114 MMHG (ref 80–100)
POIKILOCYTOSIS BLD QL SMEAR: ABNORMAL
POTASSIUM BLOOD GAS (OHS): 2.9 MMOL/L (ref 3.5–5)
POTASSIUM SERPL-SCNC: 3.3 MMOL/L (ref 3.5–5.1)
POTASSIUM SERPL-SCNC: 3.3 MMOL/L (ref 3.5–5.1)
PROT SERPL-MCNC: 5.2 GM/DL (ref 5.8–7.6)
PROT SERPL-MCNC: 6.7 GM/DL (ref 5.8–7.6)
PROT UR QL STRIP.AUTO: ABNORMAL
PS (OHS): 10 CMH2O
PTH-INTACT SERPL-MCNC: 60 PG/ML (ref 8.7–77)
RBC # BLD AUTO: 2.56 X10(6)/MCL (ref 4.7–6.1)
RBC # BLD AUTO: 3.4 X10(6)/MCL (ref 4.7–6.1)
RBC #/AREA URNS AUTO: ABNORMAL /HPF
RBC MORPH BLD: ABNORMAL
RBC UR QL AUTO: ABNORMAL
RET# (OHS): 0.03 X10E6/UL (ref 0.03–0.1)
RETICULOCYTE COUNT AUTOMATED (OLG): 1.14 % (ref 1.1–2.1)
SAMPLE SITE BLOOD GAS (OHS): ABNORMAL
SAO2 % BLDA: 98.2 %
SODIUM BLOOD GAS (OHS): 134 MMOL/L (ref 137–145)
SODIUM SERPL-SCNC: 133 MMOL/L (ref 136–145)
SODIUM SERPL-SCNC: 137 MMOL/L (ref 136–145)
SP GR UR STRIP.AUTO: >1.05 (ref 1–1.03)
SPONT+MECH VT ON VENT: 500 ML
SQUAMOUS #/AREA URNS LPF: ABNORMAL /HPF
T4 FREE SERPL-MCNC: 1.3 NG/DL (ref 0.7–1.48)
TIBC SERPL-MCNC: 124 UG/DL (ref 69–240)
TIBC SERPL-MCNC: 151 UG/DL (ref 250–450)
TRANSFERRIN SERPL-MCNC: 96 MG/DL (ref 163–344)
TRIGL SERPL-MCNC: 1569 MG/DL (ref 34–140)
TROPONIN I SERPL-MCNC: 0.04 NG/ML (ref 0–0.04)
TROPONIN I SERPL-MCNC: 0.06 NG/ML (ref 0–0.04)
TROPONIN I SERPL-MCNC: 0.06 NG/ML (ref 0–0.04)
TSH SERPL-ACNC: 1.1 UIU/ML (ref 0.35–4.94)
UROBILINOGEN UR STRIP-ACNC: 2
VIT B12 SERPL-MCNC: 1078 PG/ML (ref 213–816)
WBC # SPEC AUTO: 11.77 X10(3)/MCL (ref 4.5–11.5)
WBC # SPEC AUTO: 11.97 X10(3)/MCL (ref 4.5–11.5)
WBC #/AREA URNS AUTO: >100 /HPF
WBC VACUOLES (OHS): ABNORMAL

## 2024-03-18 PROCEDURE — P9047 ALBUMIN (HUMAN), 25%, 50ML: HCPCS | Mod: JZ,JG | Performed by: NURSE ANESTHETIST, CERTIFIED REGISTERED

## 2024-03-18 PROCEDURE — 25000003 PHARM REV CODE 250: Performed by: STUDENT IN AN ORGANIZED HEALTH CARE EDUCATION/TRAINING PROGRAM

## 2024-03-18 PROCEDURE — 85027 COMPLETE CBC AUTOMATED: CPT

## 2024-03-18 PROCEDURE — 25000003 PHARM REV CODE 250: Performed by: INTERNAL MEDICINE

## 2024-03-18 PROCEDURE — 83036 HEMOGLOBIN GLYCOSYLATED A1C: CPT

## 2024-03-18 PROCEDURE — 84484 ASSAY OF TROPONIN QUANT: CPT | Performed by: INTERNAL MEDICINE

## 2024-03-18 PROCEDURE — 87077 CULTURE AEROBIC IDENTIFY: CPT | Performed by: SPECIALIST

## 2024-03-18 PROCEDURE — 94760 N-INVAS EAR/PLS OXIMETRY 1: CPT

## 2024-03-18 PROCEDURE — 36000707: Performed by: SPECIALIST

## 2024-03-18 PROCEDURE — 87040 BLOOD CULTURE FOR BACTERIA: CPT | Performed by: SPECIALIST

## 2024-03-18 PROCEDURE — 63600175 PHARM REV CODE 636 W HCPCS: Performed by: NURSE ANESTHETIST, CERTIFIED REGISTERED

## 2024-03-18 PROCEDURE — 83735 ASSAY OF MAGNESIUM: CPT

## 2024-03-18 PROCEDURE — 83010 ASSAY OF HAPTOGLOBIN QUANT: CPT

## 2024-03-18 PROCEDURE — 87086 URINE CULTURE/COLONY COUNT: CPT | Performed by: EMERGENCY MEDICINE

## 2024-03-18 PROCEDURE — 63600175 PHARM REV CODE 636 W HCPCS: Performed by: INTERNAL MEDICINE

## 2024-03-18 PROCEDURE — 82550 ASSAY OF CK (CPK): CPT

## 2024-03-18 PROCEDURE — 85045 AUTOMATED RETICULOCYTE COUNT: CPT

## 2024-03-18 PROCEDURE — 82746 ASSAY OF FOLIC ACID SERUM: CPT

## 2024-03-18 PROCEDURE — 80061 LIPID PANEL: CPT

## 2024-03-18 PROCEDURE — 36000706: Performed by: SPECIALIST

## 2024-03-18 PROCEDURE — 84443 ASSAY THYROID STIM HORMONE: CPT

## 2024-03-18 PROCEDURE — 25000242 PHARM REV CODE 250 ALT 637 W/ HCPCS: Performed by: ANESTHESIOLOGY

## 2024-03-18 PROCEDURE — 83615 LACTATE (LD) (LDH) ENZYME: CPT

## 2024-03-18 PROCEDURE — 25000003 PHARM REV CODE 250

## 2024-03-18 PROCEDURE — 25000003 PHARM REV CODE 250: Performed by: EMERGENCY MEDICINE

## 2024-03-18 PROCEDURE — 20000000 HC ICU ROOM

## 2024-03-18 PROCEDURE — 51702 INSERT TEMP BLADDER CATH: CPT

## 2024-03-18 PROCEDURE — 63600175 PHARM REV CODE 636 W HCPCS: Performed by: EMERGENCY MEDICINE

## 2024-03-18 PROCEDURE — 27200966 HC CLOSED SUCTION SYSTEM

## 2024-03-18 PROCEDURE — 5A12012 PERFORMANCE OF CARDIAC OUTPUT, SINGLE, MANUAL: ICD-10-PCS | Performed by: INTERNAL MEDICINE

## 2024-03-18 PROCEDURE — 82553 CREATINE MB FRACTION: CPT

## 2024-03-18 PROCEDURE — 71000039 HC RECOVERY, EACH ADD'L HOUR: Performed by: SPECIALIST

## 2024-03-18 PROCEDURE — 63600175 PHARM REV CODE 636 W HCPCS

## 2024-03-18 PROCEDURE — 27201423 OPTIME MED/SURG SUP & DEVICES STERILE SUPPLY: Performed by: SPECIALIST

## 2024-03-18 PROCEDURE — D9220A PRA ANESTHESIA: Mod: CRNA,,, | Performed by: NURSE ANESTHETIST, CERTIFIED REGISTERED

## 2024-03-18 PROCEDURE — 87075 CULTR BACTERIA EXCEPT BLOOD: CPT | Performed by: SPECIALIST

## 2024-03-18 PROCEDURE — 71000033 HC RECOVERY, INTIAL HOUR: Performed by: SPECIALIST

## 2024-03-18 PROCEDURE — 84484 ASSAY OF TROPONIN QUANT: CPT

## 2024-03-18 PROCEDURE — 84100 ASSAY OF PHOSPHORUS: CPT

## 2024-03-18 PROCEDURE — 27100171 HC OXYGEN HIGH FLOW UP TO 24 HOURS

## 2024-03-18 PROCEDURE — D9220A PRA ANESTHESIA: Mod: ANES,,, | Performed by: ANESTHESIOLOGY

## 2024-03-18 PROCEDURE — 25000003 PHARM REV CODE 250: Performed by: NURSE ANESTHETIST, CERTIFIED REGISTERED

## 2024-03-18 PROCEDURE — 88307 TISSUE EXAM BY PATHOLOGIST: CPT | Performed by: SPECIALIST

## 2024-03-18 PROCEDURE — 71000015 HC POSTOP RECOV 1ST HR: Performed by: SPECIALIST

## 2024-03-18 PROCEDURE — 99900035 HC TECH TIME PER 15 MIN (STAT)

## 2024-03-18 PROCEDURE — 37000008 HC ANESTHESIA 1ST 15 MINUTES: Performed by: SPECIALIST

## 2024-03-18 PROCEDURE — 82607 VITAMIN B-12: CPT

## 2024-03-18 PROCEDURE — 83540 ASSAY OF IRON: CPT

## 2024-03-18 PROCEDURE — 63600175 PHARM REV CODE 636 W HCPCS: Performed by: ANESTHESIOLOGY

## 2024-03-18 PROCEDURE — 25000242 PHARM REV CODE 250 ALT 637 W/ HCPCS: Performed by: INTERNAL MEDICINE

## 2024-03-18 PROCEDURE — 25000003 PHARM REV CODE 250: Performed by: ANESTHESIOLOGY

## 2024-03-18 PROCEDURE — 83970 ASSAY OF PARATHORMONE: CPT

## 2024-03-18 PROCEDURE — 0VTB0ZZ RESECTION OF LEFT TESTIS, OPEN APPROACH: ICD-10-PCS | Performed by: SPECIALIST

## 2024-03-18 PROCEDURE — 84439 ASSAY OF FREE THYROXINE: CPT

## 2024-03-18 PROCEDURE — 80053 COMPREHEN METABOLIC PANEL: CPT

## 2024-03-18 PROCEDURE — 5A1945Z RESPIRATORY VENTILATION, 24-96 CONSECUTIVE HOURS: ICD-10-PCS | Performed by: INTERNAL MEDICINE

## 2024-03-18 PROCEDURE — 92950 HEART/LUNG RESUSCITATION CPR: CPT

## 2024-03-18 PROCEDURE — 94002 VENT MGMT INPAT INIT DAY: CPT

## 2024-03-18 PROCEDURE — 83605 ASSAY OF LACTIC ACID: CPT

## 2024-03-18 PROCEDURE — 80053 COMPREHEN METABOLIC PANEL: CPT | Performed by: INTERNAL MEDICINE

## 2024-03-18 PROCEDURE — 82728 ASSAY OF FERRITIN: CPT

## 2024-03-18 PROCEDURE — 31500 INSERT EMERGENCY AIRWAY: CPT

## 2024-03-18 PROCEDURE — 3E10X8Z IRRIGATION OF SKIN AND MUCOUS MEMBRANES USING IRRIGATING SUBSTANCE: ICD-10-PCS | Performed by: SPECIALIST

## 2024-03-18 PROCEDURE — 85027 COMPLETE CBC AUTOMATED: CPT | Performed by: INTERNAL MEDICINE

## 2024-03-18 PROCEDURE — 0BH17EZ INSERTION OF ENDOTRACHEAL AIRWAY INTO TRACHEA, VIA NATURAL OR ARTIFICIAL OPENING: ICD-10-PCS | Performed by: INTERNAL MEDICINE

## 2024-03-18 PROCEDURE — 81001 URINALYSIS AUTO W/SCOPE: CPT | Performed by: EMERGENCY MEDICINE

## 2024-03-18 PROCEDURE — 37000009 HC ANESTHESIA EA ADD 15 MINS: Performed by: SPECIALIST

## 2024-03-18 RX ORDER — NOREPINEPHRINE BITARTRATE/D5W 8 MG/250ML
0-3 PLASTIC BAG, INJECTION (ML) INTRAVENOUS CONTINUOUS
Status: DISCONTINUED | OUTPATIENT
Start: 2024-03-18 | End: 2024-03-22

## 2024-03-18 RX ORDER — METOPROLOL TARTRATE 1 MG/ML
5 INJECTION, SOLUTION INTRAVENOUS EVERY 5 MIN PRN
Status: DISCONTINUED | OUTPATIENT
Start: 2024-03-18 | End: 2024-03-18

## 2024-03-18 RX ORDER — SPIRONOLACTONE 25 MG/1
25 TABLET ORAL DAILY
Status: DISCONTINUED | OUTPATIENT
Start: 2024-03-18 | End: 2024-03-18

## 2024-03-18 RX ORDER — HYDROXYZINE HYDROCHLORIDE 10 MG/1
50 TABLET, FILM COATED ORAL 2 TIMES DAILY PRN
COMMUNITY

## 2024-03-18 RX ORDER — ONDANSETRON HYDROCHLORIDE 2 MG/ML
4 INJECTION, SOLUTION INTRAVENOUS ONCE AS NEEDED
Status: DISCONTINUED | OUTPATIENT
Start: 2024-03-18 | End: 2024-03-18 | Stop reason: HOSPADM

## 2024-03-18 RX ORDER — NOREPINEPHRINE BITARTRATE 1 MG/ML
INJECTION, SOLUTION INTRAVENOUS
Status: DISPENSED
Start: 2024-03-18 | End: 2024-03-19

## 2024-03-18 RX ORDER — DEXMEDETOMIDINE HYDROCHLORIDE 4 UG/ML
INJECTION, SOLUTION INTRAVENOUS
Status: COMPLETED
Start: 2024-03-18 | End: 2024-03-18

## 2024-03-18 RX ORDER — IPRATROPIUM BROMIDE AND ALBUTEROL SULFATE 2.5; .5 MG/3ML; MG/3ML
3 SOLUTION RESPIRATORY (INHALATION) ONCE
Status: COMPLETED | OUTPATIENT
Start: 2024-03-18 | End: 2024-03-18

## 2024-03-18 RX ORDER — HYDROMORPHONE HYDROCHLORIDE 2 MG/ML
0.4 INJECTION, SOLUTION INTRAMUSCULAR; INTRAVENOUS; SUBCUTANEOUS EVERY 5 MIN PRN
Status: DISCONTINUED | OUTPATIENT
Start: 2024-03-18 | End: 2024-03-18

## 2024-03-18 RX ORDER — MORPHINE SULFATE 4 MG/ML
4 INJECTION, SOLUTION INTRAMUSCULAR; INTRAVENOUS EVERY 4 HOURS PRN
Status: DISCONTINUED | OUTPATIENT
Start: 2024-03-18 | End: 2024-03-18

## 2024-03-18 RX ORDER — ONDANSETRON HYDROCHLORIDE 2 MG/ML
INJECTION, SOLUTION INTRAVENOUS
Status: DISCONTINUED | OUTPATIENT
Start: 2024-03-18 | End: 2024-03-18

## 2024-03-18 RX ORDER — PROPOFOL 10 MG/ML
0-50 INJECTION, EMULSION INTRAVENOUS CONTINUOUS
Status: DISCONTINUED | OUTPATIENT
Start: 2024-03-18 | End: 2024-03-18

## 2024-03-18 RX ORDER — METHYL SALICYLATE
LIQUID (ML) TOPICAL
Status: CANCELLED | OUTPATIENT
Start: 2024-03-18

## 2024-03-18 RX ORDER — TRIAMCINOLONE ACETONIDE 1 MG/G
CREAM TOPICAL DAILY
COMMUNITY

## 2024-03-18 RX ORDER — ALBUMIN HUMAN 250 G/1000ML
SOLUTION INTRAVENOUS
Status: DISCONTINUED | OUTPATIENT
Start: 2024-03-18 | End: 2024-03-18

## 2024-03-18 RX ORDER — SODIUM CHLORIDE 0.9 % (FLUSH) 0.9 %
10 SYRINGE (ML) INJECTION
Status: DISCONTINUED | OUTPATIENT
Start: 2024-03-18 | End: 2024-03-18

## 2024-03-18 RX ORDER — FENTANYL CITRATE 50 UG/ML
100 INJECTION, SOLUTION INTRAMUSCULAR; INTRAVENOUS ONCE
Status: COMPLETED | OUTPATIENT
Start: 2024-03-18 | End: 2024-03-18

## 2024-03-18 RX ORDER — PROPOFOL 10 MG/ML
VIAL (ML) INTRAVENOUS
Status: DISCONTINUED | OUTPATIENT
Start: 2024-03-18 | End: 2024-03-18

## 2024-03-18 RX ORDER — ATORVASTATIN CALCIUM 40 MG/1
80 TABLET, FILM COATED ORAL DAILY
Status: DISCONTINUED | OUTPATIENT
Start: 2024-03-19 | End: 2024-03-21

## 2024-03-18 RX ORDER — LIDOCAINE HYDROCHLORIDE 20 MG/ML
INJECTION, SOLUTION EPIDURAL; INFILTRATION; INTRACAUDAL; PERINEURAL
Status: DISCONTINUED | OUTPATIENT
Start: 2024-03-18 | End: 2024-03-18

## 2024-03-18 RX ORDER — CALCIUM GLUCONATE 20 MG/ML
1 INJECTION, SOLUTION INTRAVENOUS
Status: COMPLETED | OUTPATIENT
Start: 2024-03-18 | End: 2024-03-19

## 2024-03-18 RX ORDER — OXYCODONE HYDROCHLORIDE 5 MG/1
5 TABLET ORAL EVERY 4 HOURS PRN
Status: DISCONTINUED | OUTPATIENT
Start: 2024-03-18 | End: 2024-04-12 | Stop reason: HOSPADM

## 2024-03-18 RX ORDER — TALC
6 POWDER (GRAM) TOPICAL NIGHTLY PRN
Status: DISCONTINUED | OUTPATIENT
Start: 2024-03-18 | End: 2024-04-12 | Stop reason: HOSPADM

## 2024-03-18 RX ORDER — FENOFIBRATE 145 MG/1
145 TABLET, FILM COATED ORAL DAILY
Status: DISCONTINUED | OUTPATIENT
Start: 2024-03-19 | End: 2024-03-19

## 2024-03-18 RX ORDER — NOREPINEPHRINE BITARTRATE/D5W 8 MG/250ML
0-3 PLASTIC BAG, INJECTION (ML) INTRAVENOUS CONTINUOUS
Status: DISCONTINUED | OUTPATIENT
Start: 2024-03-18 | End: 2024-03-18

## 2024-03-18 RX ORDER — FENTANYL CITRATE 50 UG/ML
INJECTION, SOLUTION INTRAMUSCULAR; INTRAVENOUS
Status: DISCONTINUED | OUTPATIENT
Start: 2024-03-18 | End: 2024-03-18

## 2024-03-18 RX ORDER — SODIUM CHLORIDE, SODIUM LACTATE, POTASSIUM CHLORIDE, CALCIUM CHLORIDE 600; 310; 30; 20 MG/100ML; MG/100ML; MG/100ML; MG/100ML
INJECTION, SOLUTION INTRAVENOUS CONTINUOUS
Status: DISCONTINUED | OUTPATIENT
Start: 2024-03-19 | End: 2024-03-19

## 2024-03-18 RX ORDER — CALCIUM CHLORIDE INJECTION 100 MG/ML
INJECTION, SOLUTION INTRAVENOUS
Status: DISCONTINUED | OUTPATIENT
Start: 2024-03-18 | End: 2024-03-18

## 2024-03-18 RX ORDER — ACETAMINOPHEN 10 MG/ML
INJECTION, SOLUTION INTRAVENOUS
Status: DISCONTINUED | OUTPATIENT
Start: 2024-03-18 | End: 2024-03-18

## 2024-03-18 RX ORDER — GLYCOPYRROLATE 0.2 MG/ML
INJECTION INTRAMUSCULAR; INTRAVENOUS
Status: DISCONTINUED | OUTPATIENT
Start: 2024-03-18 | End: 2024-03-18

## 2024-03-18 RX ORDER — FENTANYL CITRATE-0.9 % NACL/PF 10 MCG/ML
0-250 PLASTIC BAG, INJECTION (ML) INTRAVENOUS CONTINUOUS
Status: DISCONTINUED | OUTPATIENT
Start: 2024-03-18 | End: 2024-03-19

## 2024-03-18 RX ORDER — ATORVASTATIN CALCIUM 10 MG/1
20 TABLET, FILM COATED ORAL DAILY
Status: DISCONTINUED | OUTPATIENT
Start: 2024-03-18 | End: 2024-03-18

## 2024-03-18 RX ORDER — VANCOMYCIN HCL IN 5 % DEXTROSE 1G/250ML
1000 PLASTIC BAG, INJECTION (ML) INTRAVENOUS
Status: DISCONTINUED | OUTPATIENT
Start: 2024-03-18 | End: 2024-03-19

## 2024-03-18 RX ORDER — PHENYLEPHRINE HYDROCHLORIDE 10 MG/ML
INJECTION INTRAVENOUS
Status: DISPENSED
Start: 2024-03-18 | End: 2024-03-19

## 2024-03-18 RX ORDER — SODIUM CHLORIDE, SODIUM LACTATE, POTASSIUM CHLORIDE, CALCIUM CHLORIDE 600; 310; 30; 20 MG/100ML; MG/100ML; MG/100ML; MG/100ML
INJECTION, SOLUTION INTRAVENOUS CONTINUOUS
Status: DISCONTINUED | OUTPATIENT
Start: 2024-03-20 | End: 2024-03-19

## 2024-03-18 RX ORDER — HYDROMORPHONE HYDROCHLORIDE 2 MG/ML
INJECTION, SOLUTION INTRAMUSCULAR; INTRAVENOUS; SUBCUTANEOUS
Status: DISCONTINUED | OUTPATIENT
Start: 2024-03-18 | End: 2024-03-18

## 2024-03-18 RX ORDER — ACETAMINOPHEN 325 MG/1
650 TABLET ORAL EVERY 8 HOURS PRN
Status: DISCONTINUED | OUTPATIENT
Start: 2024-03-18 | End: 2024-03-18

## 2024-03-18 RX ORDER — DEXMEDETOMIDINE HYDROCHLORIDE 4 UG/ML
0-1.4 INJECTION, SOLUTION INTRAVENOUS CONTINUOUS
Status: DISCONTINUED | OUTPATIENT
Start: 2024-03-18 | End: 2024-03-22

## 2024-03-18 RX ORDER — ENOXAPARIN SODIUM 100 MG/ML
40 INJECTION SUBCUTANEOUS EVERY 24 HOURS
Status: DISCONTINUED | OUTPATIENT
Start: 2024-03-18 | End: 2024-03-18

## 2024-03-18 RX ORDER — IPRATROPIUM BROMIDE AND ALBUTEROL SULFATE 2.5; .5 MG/3ML; MG/3ML
3 SOLUTION RESPIRATORY (INHALATION) EVERY 4 HOURS PRN
Status: DISCONTINUED | OUTPATIENT
Start: 2024-03-18 | End: 2024-03-19

## 2024-03-18 RX ORDER — FENTANYL CITRATE 50 UG/ML
INJECTION, SOLUTION INTRAMUSCULAR; INTRAVENOUS
Status: COMPLETED
Start: 2024-03-18 | End: 2024-03-18

## 2024-03-18 RX ORDER — DEXMEDETOMIDINE HYDROCHLORIDE 4 UG/ML
0-1.4 INJECTION, SOLUTION INTRAVENOUS CONTINUOUS
Status: DISCONTINUED | OUTPATIENT
Start: 2024-03-18 | End: 2024-03-18

## 2024-03-18 RX ORDER — PHENYLEPHRINE HYDROCHLORIDE 10 MG/ML
INJECTION INTRAVENOUS
Status: DISCONTINUED | OUTPATIENT
Start: 2024-03-18 | End: 2024-03-18

## 2024-03-18 RX ORDER — ACETAMINOPHEN 325 MG/1
650 TABLET ORAL EVERY 8 HOURS PRN
Status: DISCONTINUED | OUTPATIENT
Start: 2024-03-18 | End: 2024-04-12 | Stop reason: HOSPADM

## 2024-03-18 RX ORDER — SODIUM CHLORIDE 0.9 % (FLUSH) 0.9 %
10 SYRINGE (ML) INJECTION
Status: DISCONTINUED | OUTPATIENT
Start: 2024-03-18 | End: 2024-04-12 | Stop reason: HOSPADM

## 2024-03-18 RX ORDER — HYDROXYZINE HYDROCHLORIDE 50 MG/1
50 TABLET, FILM COATED ORAL 2 TIMES DAILY PRN
Status: DISCONTINUED | OUTPATIENT
Start: 2024-03-18 | End: 2024-03-18

## 2024-03-18 RX ADMIN — HYDROMORPHONE HYDROCHLORIDE 0.2 MG: 2 INJECTION, SOLUTION INTRAMUSCULAR; INTRAVENOUS; SUBCUTANEOUS at 04:03

## 2024-03-18 RX ADMIN — SODIUM CHLORIDE, SODIUM GLUCONATE, SODIUM ACETATE, POTASSIUM CHLORIDE AND MAGNESIUM CHLORIDE: 526; 502; 368; 37; 30 INJECTION, SOLUTION INTRAVENOUS at 04:03

## 2024-03-18 RX ADMIN — HYDROMORPHONE HYDROCHLORIDE 0.6 MG: 2 INJECTION, SOLUTION INTRAMUSCULAR; INTRAVENOUS; SUBCUTANEOUS at 03:03

## 2024-03-18 RX ADMIN — HYDROMORPHONE HYDROCHLORIDE 0.4 MG: 2 INJECTION, SOLUTION INTRAMUSCULAR; INTRAVENOUS; SUBCUTANEOUS at 02:03

## 2024-03-18 RX ADMIN — ONDANSETRON 4 MG: 2 INJECTION INTRAMUSCULAR; INTRAVENOUS at 04:03

## 2024-03-18 RX ADMIN — AMIODARONE HYDROCHLORIDE 1 MG/MIN: 1.8 INJECTION, SOLUTION INTRAVENOUS at 09:03

## 2024-03-18 RX ADMIN — DEXMEDETOMIDINE HYDROCHLORIDE 1.4 MCG/KG/HR: 400 INJECTION INTRAVENOUS at 10:03

## 2024-03-18 RX ADMIN — FENTANYL CITRATE 100 MCG: 50 INJECTION, SOLUTION INTRAMUSCULAR; INTRAVENOUS at 10:03

## 2024-03-18 RX ADMIN — LIDOCAINE HYDROCHLORIDE 80 MG: 20 INJECTION, SOLUTION EPIDURAL; INFILTRATION; INTRACAUDAL; PERINEURAL at 02:03

## 2024-03-18 RX ADMIN — GLYCOPYRROLATE 0.2 MG: 0.2 INJECTION INTRAMUSCULAR; INTRAVENOUS at 02:03

## 2024-03-18 RX ADMIN — OXYCODONE HYDROCHLORIDE 5 MG: 5 TABLET ORAL at 06:03

## 2024-03-18 RX ADMIN — CALCIUM CHLORIDE INJECTION 1 G: 100 INJECTION, SOLUTION INTRAVENOUS at 02:03

## 2024-03-18 RX ADMIN — Medication 50 MG: at 02:03

## 2024-03-18 RX ADMIN — PHENYLEPHRINE HYDROCHLORIDE 0.5 MCG/KG/MIN: 10 INJECTION INTRAVENOUS at 06:03

## 2024-03-18 RX ADMIN — HYDROMORPHONE HYDROCHLORIDE 0.2 MG: 2 INJECTION, SOLUTION INTRAMUSCULAR; INTRAVENOUS; SUBCUTANEOUS at 03:03

## 2024-03-18 RX ADMIN — OXYCODONE HYDROCHLORIDE 5 MG: 5 TABLET ORAL at 02:03

## 2024-03-18 RX ADMIN — MORPHINE SULFATE 4 MG: 4 INJECTION, SOLUTION INTRAMUSCULAR; INTRAVENOUS at 04:03

## 2024-03-18 RX ADMIN — FENTANYL CITRATE 50 MCG: 50 INJECTION, SOLUTION INTRAMUSCULAR; INTRAVENOUS at 02:03

## 2024-03-18 RX ADMIN — PHENYLEPHRINE HYDROCHLORIDE 0.5 MCG/KG/MIN: 10 INJECTION INTRAVENOUS at 02:03

## 2024-03-18 RX ADMIN — PROPOFOL 50 MCG/KG/MIN: 10 INJECTION, EMULSION INTRAVENOUS at 10:03

## 2024-03-18 RX ADMIN — SODIUM CHLORIDE, SODIUM GLUCONATE, SODIUM ACETATE, POTASSIUM CHLORIDE AND MAGNESIUM CHLORIDE: 526; 502; 368; 37; 30 INJECTION, SOLUTION INTRAVENOUS at 02:03

## 2024-03-18 RX ADMIN — NOREPINEPHRINE BITARTRATE 0.01 MCG/KG/MIN: 8 INJECTION, SOLUTION INTRAVENOUS at 08:03

## 2024-03-18 RX ADMIN — VANCOMYCIN HYDROCHLORIDE 1000 MG: 1 INJECTION, POWDER, LYOPHILIZED, FOR SOLUTION INTRAVENOUS at 11:03

## 2024-03-18 RX ADMIN — Medication 200 MG: at 02:03

## 2024-03-18 RX ADMIN — EPINEPHRINE 0.03 MCG/KG/MIN: 1 INJECTION INTRAMUSCULAR; INTRAVENOUS; SUBCUTANEOUS at 02:03

## 2024-03-18 RX ADMIN — PIPERACILLIN AND TAZOBACTAM 4.5 G: 4; .5 INJECTION, POWDER, LYOPHILIZED, FOR SOLUTION INTRAVENOUS; PARENTERAL at 02:03

## 2024-03-18 RX ADMIN — PHENYLEPHRINE HYDROCHLORIDE 200 MCG: 10 INJECTION INTRAVENOUS at 02:03

## 2024-03-18 RX ADMIN — ACETAMINOPHEN 1000 MG: 10 INJECTION, SOLUTION INTRAVENOUS at 05:03

## 2024-03-18 RX ADMIN — ALBUMIN (HUMAN) 100 ML: 12.5 SOLUTION INTRAVENOUS at 03:03

## 2024-03-18 RX ADMIN — PIPERACILLIN AND TAZOBACTAM 4.5 G: 4; .5 INJECTION, POWDER, LYOPHILIZED, FOR SOLUTION INTRAVENOUS; PARENTERAL at 06:03

## 2024-03-18 RX ADMIN — HYDROMORPHONE HYDROCHLORIDE 0.4 MG: 2 INJECTION, SOLUTION INTRAMUSCULAR; INTRAVENOUS; SUBCUTANEOUS at 03:03

## 2024-03-18 RX ADMIN — IPRATROPIUM BROMIDE AND ALBUTEROL SULFATE 3 ML: 2.5; .5 SOLUTION RESPIRATORY (INHALATION) at 06:03

## 2024-03-18 RX ADMIN — HYDROCORTISONE SODIUM SUCCINATE 100 MG: 100 INJECTION, POWDER, FOR SOLUTION INTRAMUSCULAR; INTRAVENOUS at 06:03

## 2024-03-18 RX ADMIN — IPRATROPIUM BROMIDE AND ALBUTEROL SULFATE 3 ML: 2.5; .5 SOLUTION RESPIRATORY (INHALATION) at 01:03

## 2024-03-18 RX ADMIN — NOREPINEPHRINE BITARTRATE 0.3 MCG/KG/MIN: 8 INJECTION, SOLUTION INTRAVENOUS at 10:03

## 2024-03-18 RX ADMIN — ALBUMIN (HUMAN) 100 ML: 12.5 SOLUTION INTRAVENOUS at 02:03

## 2024-03-18 NOTE — CONSULTS
[facility] - Emergency Dept    Cardiology  Consult Note    Patient Name: Ran Reyes Jr.  MRN: [id number]  Admission Date: 3/17/2024  Hospital Length of Stay: 0 days  Code Status: Full Code   Attending Provider: Harris Hernandez MD   Consulting Provider: Sana James MD  Primary Care Physician: Shiela, Primary Doctor  Principal Problem:Scrotal hematoma    Patient information was obtained from patient, past medical records, and ER records.     Subjective:     Chief Complaint/Reason for Consult: OAC recs    HPI: Ran Reyes Jr. Is a 68 year old male with PMH of nonischemic cardiomyopathy, systolic heart failure with an EF of 41%, VHD, chronic AFib on Xarelto, peripheral arterial disease, COPD, HTN, and a large left testicle hydrocele who presented to the ED 3/18/24 for scrotal bleeding with testicular swelling and pain. Found to have sepsis likely due to UTI, acute bronchitis. Also found in ED to be in Afib RVR on EKG, bp 90s/60s. BNP 1273, troponin 0.045 --> 0.055. Patient admits to dyspnea, cough, and wheezing. Denies chest pains. Cardiology being consulted for OAC recs.    PMH: nonischemic cardiomyopathy, systolic heart failure with an EF of 41%, VHD, chronic AFib on Xarelto, peripheral arterial disease, COPD, HTN  PSH: cardiac stent >10 years ago, L FA atherectomy and angioplasty, R SFA stent, cataract extraction,   Family History: Father MI at age 66.   Social History: 10+ pack year hx current smoker, social EtOH, denies drugs.    Previous Cardiac Diagnostics:   TTE 1/31/24:     Left Ventricle: The left ventricle is normal in size. Mildly increased wall thickness. There is reduced systolic function. Biplane (2D) method of discs ejection fraction is 41%. Unable to assess diastolic function due to atrial fibrillation.    Right Ventricle: Normal right ventricular cavity size. Systolic function is mildly reduced.    Left Atrium: Left atrium is severely dilated.    Right Atrium: Right atrium is  severely dilated.    Aortic Valve: The aortic valve is a trileaflet valve.    Mitral Valve: There is bileaflet sclerosis. There is moderate to severe regurgitation.    Tricuspid Valve: There is mild regurgitation.    IVC/SVC: Normal venous pressure at 3 mmHg.        Past Medical History:   Diagnosis Date    A-fib     Anticoagulant long-term use     Aortic aneurysm     Cataract     CHF (congestive heart failure)     Chronic atrial fibrillation     COPD (chronic obstructive pulmonary disease)     Coronary artery disease     HLD (hyperlipidemia)     Hypertension     Mitral regurgitation     PAD (peripheral artery disease)     Primary open angle glaucoma (POAG)      Past Surgical History:   Procedure Laterality Date    ATHERECTOMY OF PERIPHERAL VESSEL Left 09/12/2022    LEFT SFA ATHERECTOMY, BALLOON ANGIOPLASTY    CATARACT EXTRACTION W/  INTRAOCULAR LENS IMPLANT Left 3/9/2023    Procedure: EXTRACTION, CATARACT, WITH IOL INSERTION;  Surgeon: Georgi Borja MD;  Location: [facility];  Service: Ophthalmology;  Laterality: Left;  19.5  mac    Heart Stent N/A     > 10yrs. AGO    INSERTION OF STENT INTO PERIPHERAL VESSEL Right 10/17/2022    RIGHT SFA ATHERECTOMY, BALLOON ANGIOPLASTY, STENT; RIGHT ANTERIOR TIBIAL ARTERY ATHERECTOMY, BALLOON ANGIOPLASTY     Review of patient's allergies indicates:  No Known Allergies  Current Facility-Administered Medications on File Prior to Encounter   Medication    0.9%  NaCl infusion    albuterol-ipratropium 2.5 mg-0.5 mg/3 mL nebulizer solution 3 mL    diphenhydrAMINE injection 25 mg    HYDROmorphone injection 0.2 mg    HYDROmorphone injection 0.5 mg    LIDOcaine (PF) 10 mg/ml (1%) injection 10 mg    LIDOcaine (PF) 10 mg/ml (1%) injection 10 mg    ondansetron injection 4 mg    oxyCODONE-acetaminophen 5-325 mg per tablet 2 tablet    prochlorperazine injection Soln 5 mg     Current Outpatient Medications on File Prior to Encounter   Medication Sig    clopidogreL (PLAVIX) 75 mg tablet Take 1  tablet (75 mg total) by mouth once daily.    empagliflozin (JARDIANCE) 10 mg tablet Take 1 tablet (10 mg total) by mouth once daily.    furosemide (LASIX) 20 MG tablet Take 1 tablet (20 mg total) by mouth once daily.    hydrOXYzine HCL (ATARAX) 10 MG Tab Take 50 mg by mouth 2 (two) times daily as needed.    rivaroxaban (XARELTO) 20 mg Tab Take 20 mg by mouth.    sacubitriL-valsartan (ENTRESTO) 49-51 mg per tablet Take 1 tablet by mouth 2 (two) times daily.    simvastatin (ZOCOR) 40 MG tablet Take 1 tablet (40 mg total) by mouth every evening.    spironolactone (ALDACTONE) 25 MG tablet Take 1 tablet (25 mg total) by mouth once daily.    triamcinolone acetonide 0.1% (KENALOG) 0.1 % cream Apply topically Daily.    cetirizine (ZYRTEC) 10 MG tablet Take 1 tablet (10 mg total) by mouth once daily. for 14 days    [DISCONTINUED] ADVAIR HFA 45-21 mcg/actuation HFAA inhaler Waiting on refill from Doctor    [DISCONTINUED] aspirin (ECOTRIN) 81 MG EC tablet Take 81 mg by mouth once daily.    [DISCONTINUED] flurbiprofen (OCUFEN) 0.03 % ophthalmic solution 1 drop once. Do not use while wearing contact lenses    [DISCONTINUED] fluticasone propionate (FLONASE) 50 mcg/actuation nasal spray 1 spray (50 mcg total) by Each Nostril route 2 (two) times daily as needed for Rhinitis.    [DISCONTINUED] HYDROcodone-acetaminophen (NORCO) 5-325 mg per tablet Take 1 tablet by mouth every 6 (six) hours as needed for Pain. (Patient not taking: Reported on 11/15/2023)    [DISCONTINUED] ipratropium (ATROVENT HFA) 17 mcg/actuation inhaler Inhale into the lungs.    [DISCONTINUED] levoFLOXacin (LEVAQUIN) 500 MG tablet Take 1 tablet (500 mg total) by mouth once daily. (Patient not taking: Reported on 11/15/2023)    [DISCONTINUED] metoprolol succinate (TOPROL-XL) 25 MG 24 hr tablet Take 25 mg by mouth once daily.    [DISCONTINUED] moxifloxacin (VIGAMOX) 0.5 % ophthalmic solution 1 drop 3 (three) times daily.    [DISCONTINUED] nicotine (NICODERM CQ) 7  mg/24 hr Place 1 patch onto the skin once daily. (Patient not taking: Reported on 11/15/2023)    [DISCONTINUED] nicotine polacrilex 4 MG Lozg Take 1 lozenge (4 mg total) by mouth as needed (withdrawl from nicotine). (Patient not taking: Reported on 11/15/2023)    [DISCONTINUED] prednisoLONE acetate (PRED FORTE) 1 % DrpS Place 1 drop into the left eye 3 (three) times daily. (Patient not taking: Reported on 11/15/2023)    [DISCONTINUED] tadalafiL (CIALIS) 20 MG Tab Take 1 tablet (20 mg total) by mouth daily as needed (prior to sexual activity). (Patient not taking: Reported on 1/4/2024)    [DISCONTINUED] torsemide (DEMADEX) 10 MG Tab Take by mouth.     Family History       Problem Relation (Age of Onset)    Cancer Mother    Heart failure Father    Hypertension Mother, Father    No Known Problems Sister    Stroke Father          Tobacco Use    Smoking status: Every Day     Current packs/day: 0.25     Average packs/day: 0.3 packs/day for 40.0 years (10.0 ttl pk-yrs)     Types: Cigarettes     Passive exposure: Current    Smokeless tobacco: Never   Substance and Sexual Activity    Alcohol use: Yes     Alcohol/week: 3.0 standard drinks of alcohol     Types: 3 Cans of beer per week     Comment: socially    Drug use: Yes     Frequency: 1.0 times per week     Types: Marijuana     Comment: no use in over 2 months    Sexual activity: Not on file       Review of Systems   Constitutional:  Negative for chills and fever.   Respiratory:  Positive for cough, shortness of breath and wheezing.    Cardiovascular:  Negative for chest pain, palpitations and leg swelling.   Gastrointestinal:  Negative for abdominal pain, nausea and vomiting.   Genitourinary:  Positive for scrotal swelling and testicular pain.   Neurological:  Negative for dizziness, weakness and light-headedness.     Objective:     Vital Signs (Most Recent):  Temp: 98.8 °F (37.1 °C) (03/18/24 0401)  Pulse: 101 (03/18/24 0701)  Resp: (!) 30 (03/18/24 0701)  BP: 100/71  (03/18/24 0701)  SpO2: 98 % (03/18/24 0701) Vital Signs (24h Range):  Temp:  [98.8 °F (37.1 °C)-99.5 °F (37.5 °C)] 98.8 °F (37.1 °C)  Pulse:  [] 101  Resp:  [14-30] 30  SpO2:  [92 %-98 %] 98 %  BP: ()/(53-87) 100/71   Weight: 77.1 kg (170 lb)  Body mass index is 23.71 kg/m².  SpO2: 98 %     No intake or output data in the 24 hours ending 03/18/24 0915  Lines/Drains/Airways       Peripheral Intravenous Line  Duration                  Peripheral IV - Single Lumen 18 G Right Antecubital -- days                  Significant Labs:  Recent Results (from the past 72 hour(s))   Comprehensive metabolic panel    Collection Time: 03/17/24  9:03 PM   Result Value Ref Range    Sodium Level 137 136 - 145 mmol/L    Potassium Level 3.5 3.5 - 5.1 mmol/L    Chloride 101 98 - 107 mmol/L    Carbon Dioxide 21 (L) 23 - 31 mmol/L    Glucose Level 99 82 - 115 mg/dL    Blood Urea Nitrogen 16.6 8.4 - 25.7 mg/dL    Creatinine 0.99 0.73 - 1.18 mg/dL    Calcium Level Total 7.4 (L) 8.8 - 10.0 mg/dL    Protein Total 5.4 (L) 5.8 - 7.6 gm/dL    Albumin Level 2.1 (L) 3.4 - 4.8 g/dL    Globulin 3.3 2.4 - 3.5 gm/dL    Albumin/Globulin Ratio 0.6 (L) 1.1 - 2.0 ratio    Bilirubin Total 1.0 <=1.5 mg/dL    Alkaline Phosphatase 76 40 - 150 unit/L    Alanine Aminotransferase 27 0 - 55 unit/L    Aspartate Aminotransferase 112 (H) 5 - 34 unit/L    eGFR >60 mls/min/1.73/m2   Brain natriuretic peptide    Collection Time: 03/17/24  9:03 PM   Result Value Ref Range    Natriuretic Peptide 1,273.3 (H) <=100.0 pg/mL   APTT    Collection Time: 03/17/24  9:03 PM   Result Value Ref Range    PTT 32.4 23.2 - 33.7 seconds   Protime-INR    Collection Time: 03/17/24  9:03 PM   Result Value Ref Range    PT 19.1 (H) 12.5 - 14.5 seconds    INR 1.6 (H) <=1.3   Magnesium    Collection Time: 03/17/24  9:03 PM   Result Value Ref Range    Magnesium Level 2.20 1.60 - 2.60 mg/dL   Troponin I    Collection Time: 03/17/24  9:03 PM   Result Value Ref Range    Troponin-I 0.063  (H) 0.000 - 0.045 ng/mL   CBC with Differential    Collection Time: 03/17/24  9:03 PM   Result Value Ref Range    WBC 15.53 (H) 4.50 - 11.50 x10(3)/mcL    RBC 3.33 (L) 4.70 - 6.10 x10(6)/mcL    Hgb 10.3 (L) 14.0 - 18.0 g/dL    Hct 31.8 (L) 42.0 - 52.0 %    MCV 95.5 (H) 80.0 - 94.0 fL    MCH 30.9 27.0 - 31.0 pg    MCHC 32.4 (L) 33.0 - 36.0 g/dL    RDW 15.9 11.5 - 17.0 %    Platelet 141 130 - 400 x10(3)/mcL    MPV 11.8 (H) 7.4 - 10.4 fL    NRBC% 0.0 %    IPF 12.5 (H) 0.9 - 11.2 %   Manual Differential    Collection Time: 03/17/24  9:03 PM   Result Value Ref Range    Neutrophils % 91 %    Lymphs % 4 %    Monocytes % 4 %    Neutrophils Abs      Poikilocytosis 1+ (A) (none)    Macrocytosis 1+ (A) (none)    Jewell Cells 1+ (A) (none)   Lactic acid, plasma    Collection Time: 03/17/24  9:09 PM   Result Value Ref Range    Lactic Acid Level 2.9 (H) 0.5 - 2.2 mmol/L   Lactic Acid, Plasma    Collection Time: 03/17/24 11:02 PM   Result Value Ref Range    Lactic Acid Level 1.8 0.5 - 2.2 mmol/L   Urinalysis, Reflex to Urine Culture    Collection Time: 03/18/24 12:34 AM    Specimen: Urine   Result Value Ref Range    Color, UA Yellow Yellow, Light-Yellow, Colorless, Straw, Dark-Yellow    Appearance, UA Clear Clear    Specific Gravity, UA >1.050 (H) 1.005 - 1.030    pH, UA 6.5 5.0 - 8.5    Protein, UA 1+ (A) Negative    Glucose, UA 1+ (A) Negative, Normal    Ketones, UA Negative Negative    Blood, UA Trace (A) Negative    Bilirubin, UA Negative Negative    Urobilinogen, UA 2.0 (A) 0.2, 1.0, Normal    Nitrites, UA Negative Negative    Leukocyte Esterase,  (A) Negative    WBC, UA >100 (A) None Seen, 0-2, 3-5, 0-5 /HPF    Bacteria, UA Many (A) None Seen, Trace /HPF    Squamous Epithelial Cells, UA Trace None Seen /HPF    Mucous, UA Trace (A) None Seen /LPF    RBC, UA 11-20 (A) None Seen, 0-2, 3-5, 0-5 /HPF   Troponin I    Collection Time: 03/18/24  1:32 AM   Result Value Ref Range    Troponin-I 0.045 0.000 - 0.045 ng/mL    Comprehensive metabolic panel    Collection Time: 03/18/24  5:33 AM   Result Value Ref Range    Sodium Level 137 136 - 145 mmol/L    Potassium Level 3.3 (L) 3.5 - 5.1 mmol/L    Chloride 99 98 - 107 mmol/L    Carbon Dioxide 28 23 - 31 mmol/L    Glucose Level 99 82 - 115 mg/dL    Blood Urea Nitrogen 16.3 8.4 - 25.7 mg/dL    Creatinine 0.87 0.73 - 1.18 mg/dL    Calcium Level Total 7.4 (L) 8.8 - 10.0 mg/dL    Protein Total 5.2 (L) 5.8 - 7.6 gm/dL    Albumin Level 2.0 (L) 3.4 - 4.8 g/dL    Globulin 3.2 2.4 - 3.5 gm/dL    Albumin/Globulin Ratio 0.6 (L) 1.1 - 2.0 ratio    Bilirubin Total 0.8 <=1.5 mg/dL    Alkaline Phosphatase 73 40 - 150 unit/L    Alanine Aminotransferase 21 0 - 55 unit/L    Aspartate Aminotransferase 67 (H) 5 - 34 unit/L    eGFR >60 mls/min/1.73/m2   Troponin I    Collection Time: 03/18/24  5:33 AM   Result Value Ref Range    Troponin-I 0.055 (H) 0.000 - 0.045 ng/mL   CBC with Differential    Collection Time: 03/18/24  5:33 AM   Result Value Ref Range    WBC 11.97 (H) 4.50 - 11.50 x10(3)/mcL    RBC 3.40 (L) 4.70 - 6.10 x10(6)/mcL    Hgb 10.5 (L) 14.0 - 18.0 g/dL    Hct 32.4 (L) 42.0 - 52.0 %    MCV 95.3 (H) 80.0 - 94.0 fL    MCH 30.9 27.0 - 31.0 pg    MCHC 32.4 (L) 33.0 - 36.0 g/dL    RDW 15.8 11.5 - 17.0 %    Platelet 136 130 - 400 x10(3)/mcL    MPV 12.0 (H) 7.4 - 10.4 fL    NRBC% 0.0 %    IPF 12.9 (H) 0.9 - 11.2 %     Significant Imaging:  Imaging Results               US SCROTUM AND TESTICLES WITH DOPPLER (XPD) (Final result)  Result time 03/18/24 06:44:20   Procedure changed from US Scrotum And Testicles     Final result by Lito Hurst MD (03/18/24 06:44:20)                   Impression:      Limited scan due to significant pain.    Pronounce edematous thickening of the superficial soft tissues measure approximately 1.7 cm.    Inability to  the right and left testicles with a single hypoechoic structure in the midline with measurements as above and not appreciable blood flow exact  nature of these abnormality cannot be completely ascertained by this exam changes suggestive of testicular torsion are not completely excluded clinical correlation is suggested.    This report was flagged in Epic as abnormal.      Electronically signed by: Lito Hurst  Date:    03/18/2024  Time:    06:44               Narrative:    FINDINGS:  Scan is limited due to patient's pain level not allowing him to tolerate the scan.  There is pronounced edematous thickening of the superficial soft tissues of the scrotum with a thickness of 1.7 cm.  The right and left testicles are not clearly separately visualize.  There is a single 12.4 x 6.6 x 8 cm hypoechoic structure in the midline (possibly representing testicle with no appreciable blood flow identified exact nature of these abnormality cannot be ascertained by this exam                        Preliminary result by Gilberto Graff MD (03/18/24 00:41:35)                   Impression:    1. Limited scan due to patient's pain level not allowing him to tolerate the scan. There is pronounced edematous thickening of the superficial soft tissues of the scrotum measuring 1.6 by centimetre in thickness. The right and left testis are not separately visualised. A single 12.38 x 6.57 x 7.99 cm hypoechoic structure is noted in the midline (possibly the testicle(s) ) with no appreciable blood flow identified. Correlate with clinical and laboratory findings as regards additional evaluation and follow-up as although the identified structure without flow is somewhat indeterminate the possibility of testicular torsion is not entirely excluded.  2. Details as above.               Narrative:    START OF REPORT:  Technique: Limited scrotal ultrasound was performed.    Comparison: None.    Clinical history: Scrotal swelling.    Findings:  Scrotum: Limited scan due to patient's pain level not allowing him to tolerate the scan. There is pronounced edematous thickening of the superficial  soft tissues of the scrotum measuring 1.6 by centimetre in thickness. The right and left testis are not separately visualised. A single 12.38 x 6.57 x 7.99 cm hypoechoic structure is noted in the midline (possibly the testicle(s) ) with no appreciable blood flow identified.  Miscellaneous: The results were discussed with the emergency room physician (Dr Cifuentes) prior to dictation at 2024-03-18 00:41:21 CDT.                                         X-Ray Chest 1 View (Final result)  Result time 03/18/24 05:18:10      Final result by Lito Hurst MD (03/18/24 05:18:10)                   Impression:      Cardiomegaly.    Minimal increase interstitial markings      Electronically signed by: Lito Hurst  Date:    03/18/2024  Time:    05:18               Narrative:    EXAMINATION:  XR CHEST 1 VIEW    CPT 05376    CLINICAL HISTORY:  chf;    COMPARISON:  October 23, 2023    FINDINGS:  Examination reveals mediastinal silhouette to be within normal limits cardiac silhouette is enlarged lung fields reveal some increase interstitial markings with no focal consolidative changes atelectases effusions or pneumothoraces                                       CTA Abdomen and Pelvis (Final result)  Result time 03/17/24 21:44:01      Final result by Mickey West MD (03/17/24 21:44:01)                   Impression:      1. Stable infrarenal abdominal aortic aneurysmal dilatation with mural thrombus.  No contrast extravasation from major abdominopelvic vasculature..    2. No active gastrointestinal hemorrhage identified.    3. Large amount of hyperdense hemorrhagic fluid within the scrotum without active bleeding.  The source of this hemorrhage is not determined on this exam.  This may be secondary to scrotal vasculature.  Please further assess with ultrasound exam with Doppler.      Electronically signed by: Mickey West  Date:    03/17/2024  Time:    21:44               Narrative:    EXAMINATION:  CTA ABDOMEN AND  PELVIS    CLINICAL HISTORY:  bleeding from rectum posterior scrotum, hx of AAA, severely enlarged scrotum;    TECHNIQUE:  Multidetector axial images were obtained of the abdomen and pelvis before and following the administration of IV contrast. Oral contrast was not administered.    Dose length product of 968 mGycm. Automated exposure control was utilized to minimize radiation dose.    COMPARISON:  CT brain October 25, 2023.    FINDINGS:  There is trace of right dependent pleural effusion.  Visualized portion of the lungs are without acute consolidation or congestive process.  Cardiac chambers are enlarged in size.    Hepatic volume and attenuation is unremarkable. Gallbladder wall is not thickened and there is no intra luminal calcified calculus. No biliary dilation identified. Pancreatic unremarkable attenuation without acute peripancreatic phlegmons. Main pancreatic duct is not dilated. Spleen is of normal size without focal lesion.    Bilateral adrenal glands mild thickening suggest hyperplasia with similar appearance..  There is left kidney upper pole scarring. There is no hydronephrosis.  There are similar mild perinephric strandings.    There is abdominal aortic aneurysmal dilatation with anterior aspect mural thrombus on image 114 series 15 where maximum diameter is 3.6 cm without significant interval change.  There are no signs of abdominal aortic dissection or periaortic inflammation or contrast extravasation.  Calcified plaques involve iliac arteries.  Flow is present bilaterally within the internal external iliac arteries to the femoral arteries.  There is no major vascular contrast extravasation.    Stomach is mostly decompressed.  No abnormal dilatation of the loops of small bowel.  There is no focal or generalized mural thickening of the small bowel loops.  There is moderate colonic fecal loading without abnormal dilatation or pericolonic acute strandings.  There is no active contrast extravasation  within colon, especially the anorectum to suggest active gastrointestinal hemorrhage.    There is large amount of hyperdense hemorrhagic fluid within the scrotum.  There is scrotal central aspect hypodensity with thin rim which measures 11.0 cm by 5.0 cm and may represent a hydrocele.  There is no active bleeding within the scrotum.  The cause for scrotal hemorrhage is not determined on this exam.  Testicles are not delineated as discrete structures.  Please further assess with ultrasound exam.  Urinary bladder is decompressed with slightly thickened wall.  There are multiple prostatic calcifications.  Bilateral inguinal fat containing hernias.  There are bilateral inguinal up to 1.2 cm enlarged lymph nodes.    No acute or otherwise osseous abnormality identified.                                    EKG: Afib RVR     Telemetry:  Afib    Physical Exam  Constitutional:       Appearance: He is not ill-appearing.   HENT:      Head: Normocephalic.      Nose: Nose normal.   Eyes:      Conjunctiva/sclera: Conjunctivae normal.   Cardiovascular:      Rate and Rhythm: Tachycardia present. Rhythm irregular.      Pulses: Normal pulses.      Heart sounds: No murmur heard.  Pulmonary:      Effort: Pulmonary effort is normal. No respiratory distress.      Breath sounds: Rhonchi present.   Abdominal:      General: Abdomen is flat. Bowel sounds are normal.      Palpations: Abdomen is soft.      Tenderness: There is no abdominal tenderness.   Genitourinary:     Comments: Scrotal swelling  Musculoskeletal:      Right lower leg: No edema.      Left lower leg: No edema.   Skin:     General: Skin is warm and dry.      Capillary Refill: Capillary refill takes less than 2 seconds.   Neurological:      Mental Status: He is alert.       Home Medications:   Current Facility-Administered Medications on File Prior to Encounter   Medication Dose Route Frequency Provider Last Rate Last Admin    0.9%  NaCl infusion   Intravenous Continuous Srini  Shannon VÁSQUEZ MD 10 mL/hr at 03/09/23 1020 New Bag at 03/09/23 1020    albuterol-ipratropium 2.5 mg-0.5 mg/3 mL nebulizer solution 3 mL  3 mL Nebulization Once PRN Shannon Milligan MD        diphenhydrAMINE injection 25 mg  25 mg Intravenous Once PRN Shannon Milligan MD        HYDROmorphone injection 0.2 mg  0.2 mg Intravenous Q5 Min PRN Shannon Milligan MD        HYDROmorphone injection 0.5 mg  0.5 mg Intravenous Q5 Min PRN Shannon Milligan MD        LIDOcaine (PF) 10 mg/ml (1%) injection 10 mg  1 mL Intradermal Once Lanette Ulloa FNP        LIDOcaine (PF) 10 mg/ml (1%) injection 10 mg  1 mL Intradermal Once Shannon Milligan MD        ondansetron injection 4 mg  4 mg Intravenous Once Shannon Milligan MD        oxyCODONE-acetaminophen 5-325 mg per tablet 2 tablet  2 tablet Oral Once Shannon Milligan MD        prochlorperazine injection Soln 5 mg  5 mg Intravenous Once PRN Shannon Milligan MD         Current Outpatient Medications on File Prior to Encounter   Medication Sig Dispense Refill    clopidogreL (PLAVIX) 75 mg tablet Take 1 tablet (75 mg total) by mouth once daily. 90 tablet 3    empagliflozin (JARDIANCE) 10 mg tablet Take 1 tablet (10 mg total) by mouth once daily. 90 tablet 3    furosemide (LASIX) 20 MG tablet Take 1 tablet (20 mg total) by mouth once daily. 90 tablet 3    hydrOXYzine HCL (ATARAX) 10 MG Tab Take 50 mg by mouth 2 (two) times daily as needed.      rivaroxaban (XARELTO) 20 mg Tab Take 20 mg by mouth.      sacubitriL-valsartan (ENTRESTO) 49-51 mg per tablet Take 1 tablet by mouth 2 (two) times daily. 180 tablet 3    simvastatin (ZOCOR) 40 MG tablet Take 1 tablet (40 mg total) by mouth every evening. 90 tablet 3    spironolactone (ALDACTONE) 25 MG tablet Take 1 tablet (25 mg total) by mouth once daily. 90 tablet 3    triamcinolone acetonide 0.1% (KENALOG) 0.1 % cream Apply topically Daily.      cetirizine (ZYRTEC) 10 MG tablet Take 1 tablet (10 mg total) by mouth  once daily. for 14 days 14 tablet 0    [DISCONTINUED] ADVAIR HFA 45-21 mcg/actuation HFAA inhaler Waiting on refill from Doctor      [DISCONTINUED] aspirin (ECOTRIN) 81 MG EC tablet Take 81 mg by mouth once daily.      [DISCONTINUED] flurbiprofen (OCUFEN) 0.03 % ophthalmic solution 1 drop once. Do not use while wearing contact lenses      [DISCONTINUED] fluticasone propionate (FLONASE) 50 mcg/actuation nasal spray 1 spray (50 mcg total) by Each Nostril route 2 (two) times daily as needed for Rhinitis. 15 g 0    [DISCONTINUED] HYDROcodone-acetaminophen (NORCO) 5-325 mg per tablet Take 1 tablet by mouth every 6 (six) hours as needed for Pain. (Patient not taking: Reported on 11/15/2023) 20 tablet 0    [DISCONTINUED] ipratropium (ATROVENT HFA) 17 mcg/actuation inhaler Inhale into the lungs.      [DISCONTINUED] levoFLOXacin (LEVAQUIN) 500 MG tablet Take 1 tablet (500 mg total) by mouth once daily. (Patient not taking: Reported on 11/15/2023) 7 tablet 0    [DISCONTINUED] metoprolol succinate (TOPROL-XL) 25 MG 24 hr tablet Take 25 mg by mouth once daily.      [DISCONTINUED] moxifloxacin (VIGAMOX) 0.5 % ophthalmic solution 1 drop 3 (three) times daily.      [DISCONTINUED] nicotine (NICODERM CQ) 7 mg/24 hr Place 1 patch onto the skin once daily. (Patient not taking: Reported on 11/15/2023) 14 patch 3    [DISCONTINUED] nicotine polacrilex 4 MG Lozg Take 1 lozenge (4 mg total) by mouth as needed (withdrawl from nicotine). (Patient not taking: Reported on 11/15/2023) 108 lozenge 3    [DISCONTINUED] prednisoLONE acetate (PRED FORTE) 1 % DrpS Place 1 drop into the left eye 3 (three) times daily. (Patient not taking: Reported on 11/15/2023) 5 mL 0    [DISCONTINUED] tadalafiL (CIALIS) 20 MG Tab Take 1 tablet (20 mg total) by mouth daily as needed (prior to sexual activity). (Patient not taking: Reported on 1/4/2024) 30 tablet 11    [DISCONTINUED] torsemide (DEMADEX) 10 MG Tab Take by mouth.       Current Inpatient  Medications:    Current Facility-Administered Medications:     acetaminophen tablet 650 mg, 650 mg, Oral, Q8H PRN, Harris Hernandez MD    acetaminophen tablet 650 mg, 650 mg, Oral, Q8H PRN, Harris Hernandez MD    albuterol-ipratropium 2.5 mg-0.5 mg/3 mL nebulizer solution 3 mL, 3 mL, Nebulization, Q4H PRN, Harris Hernandez MD    atorvastatin tablet 20 mg, 20 mg, Oral, Daily, Harris Hernandez MD    hydrOXYzine tablet 50 mg, 50 mg, Oral, BID PRN, Harris Hernandez MD    melatonin tablet 6 mg, 6 mg, Oral, Nightly PRN, Harris Hernandez MD    metoprolol injection 5 mg, 5 mg, Intravenous, Q5 Min PRN, Harris Hernandez MD    morphine injection 4 mg, 4 mg, Intravenous, Q4H PRN, Harris Hernandez MD, 4 mg at 03/18/24 0405    oxyCODONE immediate release tablet 5 mg, 5 mg, Oral, Q4H PRN, Harris Hernandez MD, 5 mg at 03/18/24 0632    piperacillin-tazobactam (ZOSYN) 4.5 g in dextrose 5 % in water (D5W) 100 mL IVPB (MB+), 4.5 g, Intravenous, Q8H, Harris Hernandez MD, Last Rate: 25 mL/hr at 03/18/24 0628, 4.5 g at 03/18/24 0628    sodium chloride 0.9% flush 10 mL, 10 mL, Intravenous, PRN, Harris Hernandez MD    Pharmacy to dose Vancomycin consult, , , Once **AND** vancomycin - pharmacy to dose, , Intravenous, pharmacy to manage frequency, Harris Hernandez MD    vancomycin in dextrose 5 % 1 gram/250 mL IVPB 1,000 mg, 1,000 mg, Intravenous, Q12H, Harris Hernandez MD    Current Outpatient Medications:     clopidogreL (PLAVIX) 75 mg tablet, Take 1 tablet (75 mg total) by mouth once daily., Disp: 90 tablet, Rfl: 3    empagliflozin (JARDIANCE) 10 mg tablet, Take 1 tablet (10 mg total) by mouth once daily., Disp: 90 tablet, Rfl: 3    furosemide (LASIX) 20 MG tablet, Take 1 tablet (20 mg total) by mouth once daily., Disp: 90 tablet, Rfl: 3    hydrOXYzine HCL (ATARAX) 10 MG Tab, Take 50 mg by mouth 2 (two) times daily as needed., Disp: , Rfl:     rivaroxaban  (XARELTO) 20 mg Tab, Take 20 mg by mouth., Disp: , Rfl:     sacubitriL-valsartan (ENTRESTO) 49-51 mg per tablet, Take 1 tablet by mouth 2 (two) times daily., Disp: 180 tablet, Rfl: 3    simvastatin (ZOCOR) 40 MG tablet, Take 1 tablet (40 mg total) by mouth every evening., Disp: 90 tablet, Rfl: 3    spironolactone (ALDACTONE) 25 MG tablet, Take 1 tablet (25 mg total) by mouth once daily., Disp: 90 tablet, Rfl: 3    triamcinolone acetonide 0.1% (KENALOG) 0.1 % cream, Apply topically Daily., Disp: , Rfl:     cetirizine (ZYRTEC) 10 MG tablet, Take 1 tablet (10 mg total) by mouth once daily. for 14 days, Disp: 14 tablet, Rfl: 0    Facility-Administered Medications Ordered in Other Encounters:     0.9%  NaCl infusion, , Intravenous, Continuous, Shannon Milligan MD, Last Rate: 10 mL/hr at 03/09/23 1020, New Bag at 03/09/23 1020    albuterol-ipratropium 2.5 mg-0.5 mg/3 mL nebulizer solution 3 mL, 3 mL, Nebulization, Once PRN, Shannon Milligan MD    diphenhydrAMINE injection 25 mg, 25 mg, Intravenous, Once PRN, Shannon Milligan MD    HYDROmorphone injection 0.2 mg, 0.2 mg, Intravenous, Q5 Min PRN, Shannon Milligan MD    HYDROmorphone injection 0.5 mg, 0.5 mg, Intravenous, Q5 Min PRN, Shannon Milligan MD    LIDOcaine (PF) 10 mg/ml (1%) injection 10 mg, 1 mL, Intradermal, Once, Lanette Ulloa FNP    LIDOcaine (PF) 10 mg/ml (1%) injection 10 mg, 1 mL, Intradermal, Once, Shannon Milligan MD    ondansetron injection 4 mg, 4 mg, Intravenous, Once, Shannon Milligan MD    oxyCODONE-acetaminophen 5-325 mg per tablet 2 tablet, 2 tablet, Oral, Once, Shannon Milligan MD    prochlorperazine injection Soln 5 mg, 5 mg, Intravenous, Once PRN, Shannon Milligan MD  VTE Risk Mitigation (From admission, onward)           Ordered     IP VTE HIGH RISK PATIENT  Once         03/18/24 0124     Place sequential compression device  Until discontinued         03/18/24 0124                  Assessment:     Type II  demand ischemia   CHF exacerbation  Nonischemic cardiomyopathy  Severe mitral regurgitation  Chronic Afib on Xarelto, currently Afib RVR  PAD   Sepsis  Scrotal bleeding and swelling  COPD,   HTN  HLD    Plan:   - agree anticoagulation until scrotal exploration/I&D today.  - OK to continue Plavix at any time  - [id number] of 4: patient needs anticoagulation, but OK to hold Xarelto for up to 2 days if necessary. Resume home Xarelto when able.  - recommend start IV metoprolol 5 q6h while NPO (with parameters), then switch to Toprol XL when able. Not on beta blocker at home  - recommend IV Lasix 40 today as volume overloaded on exam   - Trop 0.045 --> 0.055, can stop trending  - Echo from 1/31/24 reviewed, reduced systolic function, severe mitral regurgitation. No need to repeat Echo at this time  - rec check TSH and free T4  - home meds: Plavix, Jardiance, Lasix 20 qd, Entresto 49-51, simvastatin 40, Aldactone, Xarelto. Resume PO meds when able.   - consider MV repair as outpatient      Thank you for your consult.     Sana James MD  Cardiology  [facility] - Emergency Dept  03/18/2024

## 2024-03-18 NOTE — TRANSFER OF CARE
Anesthesia Transfer of Care Note    Patient: Ran Reyes Jr.    Procedure(s) Performed: Procedure(s) (LRB):  Incision and Drainage (N/A)  ORCHIECTOMY (Left)    Patient location: PACU    Anesthesia Type: general    Transport from OR: Transported from OR on room air with adequate spontaneous ventilation    Post pain: adequate analgesia    Post assessment: no apparent anesthetic complications    Post vital signs: stable    Level of consciousness: awake, alert and responds to stimulation    Nausea/Vomiting: no nausea/vomiting    Complications: none    Transfer of care protocol was followed    Last vitals: Visit Vitals  /60   Pulse 105   Temp 37.1 °C (98.8 °F)   Resp (!) 21   Wt 77.1 kg (170 lb)   SpO2 95%   BMI 23.71 kg/m²

## 2024-03-18 NOTE — CONSULTS
"Ran Reyes . 1955  [id number]  3/18/2024    CONSULTING PHYSICIAN: Dr. Navid Cifuentes    REASON FOR CONSULTATION: scrotal hematoma    HPI:  The patient is a 68-year-old male with a past medical history of atrial fibrillation, AAA, CHF, COPD, CAD, hypertension, PAD and mitral regurg who presented to the emergency room last night with complaints of testicular swelling and bleeding.  He reports that he had had scrotal swelling for "a while".  He had an ultrasound performed in september of 2023 which revealed a large left hydrocele.  He has had worsening left testicular/scrotal pain and swelling for about a week and a half.  Last night, he began with what he thought was rectal bleeding after noticing blood in the toilet and running down his legs.  On presentation to the emergency room he was determine that the bleeding was coming from the posterior aspect of his scrotum from a superficial scrotal ulceration.  He was noted to be in AFib RVR with some borderline low blood pressures.  Lab work on arrival revealed WBC 15.5 (11.9 today), H&H 10.3/31.8 (10.5/32.4 today), BUN and creatinine 16.6/0.99; UA with clear yellow urine, trace blood, negative nitrites, 500 leukocyte esterase, 11-20 RBC, greater than 100 WBC, many bacteria, trace squamous epithelial cells.  CT abdomen and pelvis reveals large amount of hyperdense hemorrhagic fluid within the scrotum, scrotal central aspect hypodensity with thin rim measuring 11 x 5 cm possibly representing hydrocele, no active bleeding within the scrotum; testicles are not delineated as discrete structures; urinary bladder is decompressed with slightly thickened wall, multiple prostatic calcifications.  Ultrasound scrotum and testicles with Doppler scan limited due to significant pain, pronounced edematous thickening of superficial soft tissues measuring approximately 1.7 cm, inability to separate the right and left testicles with a single hypoechoic structure in the midline " measuring 12.4 x 6.6 x 8 cm with no appreciable blood flow identified, unable to exclude torsion.     The patient is resting in bed.  He reports 2 or 3 months ago he began with scrotal swelling mostly on the left.  For the last 2 weeks, his swelling has been significantly worse and he has had constant 10/10 pain.  He began with drainage from his scrotum yesterday.  He reports some dysuria; denies hematuria, fever or chills.  He denies any difficulty emptying his bladder.  He denies any prior urologic surgeries.  As far as he is aware, he has both testicles.  He denies history of diabetes.  He is on Xarelto at home.  He reports his blood pressure fluctuates at baseline.    Past Medical History:   Diagnosis Date    A-fib     Anticoagulant long-term use     Aortic aneurysm     Cataract     CHF (congestive heart failure)     Chronic atrial fibrillation     COPD (chronic obstructive pulmonary disease)     Coronary artery disease     HLD (hyperlipidemia)     Hypertension     Mitral regurgitation     PAD (peripheral artery disease)     Primary open angle glaucoma (POAG)      Past Surgical History:   Procedure Laterality Date    ATHERECTOMY OF PERIPHERAL VESSEL Left 09/12/2022    LEFT SFA ATHERECTOMY, BALLOON ANGIOPLASTY    CATARACT EXTRACTION W/  INTRAOCULAR LENS IMPLANT Left 3/9/2023    Procedure: EXTRACTION, CATARACT, WITH IOL INSERTION;  Surgeon: Georgi Borja MD;  Location: [facility];  Service: Ophthalmology;  Laterality: Left;  19.5  mac    Heart Stent N/A     > 10yrs. AGO    INSERTION OF STENT INTO PERIPHERAL VESSEL Right 10/17/2022    RIGHT SFA ATHERECTOMY, BALLOON ANGIOPLASTY, STENT; RIGHT ANTERIOR TIBIAL ARTERY ATHERECTOMY, BALLOON ANGIOPLASTY     Family History   Problem Relation Age of Onset    Cancer Mother     Hypertension Mother     Heart failure Father     Stroke Father     Hypertension Father     No Known Problems Sister      Social History     Tobacco Use    Smoking status: Every Day     Current  packs/day: 0.25     Average packs/day: 0.3 packs/day for 40.0 years (10.0 ttl pk-yrs)     Types: Cigarettes     Passive exposure: Current    Smokeless tobacco: Never   Substance Use Topics    Alcohol use: Yes     Alcohol/week: 3.0 standard drinks of alcohol     Types: 3 Cans of beer per week     Comment: socially    Drug use: Yes     Frequency: 1.0 times per week     Types: Marijuana     Comment: no use in over 2 months     Current Facility-Administered Medications   Medication Dose Route Frequency Provider Last Rate Last Admin    acetaminophen tablet 650 mg  650 mg Oral Q8H PRN Harris Hernandez MD        acetaminophen tablet 650 mg  650 mg Oral Q8H PRN Harris Hernandez MD        albuterol-ipratropium 2.5 mg-0.5 mg/3 mL nebulizer solution 3 mL  3 mL Nebulization Q4H PRN Harris Hernandez MD        atorvastatin tablet 20 mg  20 mg Oral Daily Harris Heranndez MD        hydrOXYzine tablet 50 mg  50 mg Oral BID PRN Harris Hernandez MD        melatonin tablet 6 mg  6 mg Oral Nightly PRN Harris Hernandez MD        metoprolol injection 5 mg  5 mg Intravenous Q5 Min PRN Harris Hernandez MD        morphine injection 4 mg  4 mg Intravenous Q4H PRN Harris Hernandez MD   4 mg at 03/18/24 0405    oxyCODONE immediate release tablet 5 mg  5 mg Oral Q4H PRN Harris Hernandez MD   5 mg at 03/18/24 0632    piperacillin-tazobactam (ZOSYN) 4.5 g in dextrose 5 % in water (D5W) 100 mL IVPB (MB+)  4.5 g Intravenous Q8H Harris Hernandez MD 25 mL/hr at 03/18/24 0628 4.5 g at 03/18/24 0628    sodium chloride 0.9% flush 10 mL  10 mL Intravenous PRN Harris Hernandez MD        vancomycin - pharmacy to dose   Intravenous pharmacy to manage frequency Harris Hernandez MD        vancomycin in dextrose 5 % 1 gram/250 mL IVPB 1,000 mg  1,000 mg Intravenous Q12H Harris Hernandez MD         Current Outpatient Medications   Medication Sig Dispense Refill    clopidogreL (PLAVIX)  75 mg tablet Take 1 tablet (75 mg total) by mouth once daily. 90 tablet 3    empagliflozin (JARDIANCE) 10 mg tablet Take 1 tablet (10 mg total) by mouth once daily. 90 tablet 3    furosemide (LASIX) 20 MG tablet Take 1 tablet (20 mg total) by mouth once daily. 90 tablet 3    hydrOXYzine HCL (ATARAX) 10 MG Tab Take 50 mg by mouth 2 (two) times daily as needed.      rivaroxaban (XARELTO) 20 mg Tab Take 20 mg by mouth.      sacubitriL-valsartan (ENTRESTO) 49-51 mg per tablet Take 1 tablet by mouth 2 (two) times daily. 180 tablet 3    simvastatin (ZOCOR) 40 MG tablet Take 1 tablet (40 mg total) by mouth every evening. 90 tablet 3    spironolactone (ALDACTONE) 25 MG tablet Take 1 tablet (25 mg total) by mouth once daily. 90 tablet 3    triamcinolone acetonide 0.1% (KENALOG) 0.1 % cream Apply topically Daily.      cetirizine (ZYRTEC) 10 MG tablet Take 1 tablet (10 mg total) by mouth once daily. for 14 days 14 tablet 0     Facility-Administered Medications Ordered in Other Encounters   Medication Dose Route Frequency Provider Last Rate Last Admin    0.9%  NaCl infusion   Intravenous Continuous Shannon Milligan MD 10 mL/hr at 03/09/23 1020 New Bag at 03/09/23 1020    albuterol-ipratropium 2.5 mg-0.5 mg/3 mL nebulizer solution 3 mL  3 mL Nebulization Once PRN Shannon Milligan MD        diphenhydrAMINE injection 25 mg  25 mg Intravenous Once PRN Shannon Milligan MD        HYDROmorphone injection 0.2 mg  0.2 mg Intravenous Q5 Min PRN Shannon Milligan MD        HYDROmorphone injection 0.5 mg  0.5 mg Intravenous Q5 Min PRN Shannon Milligan MD        LIDOcaine (PF) 10 mg/ml (1%) injection 10 mg  1 mL Intradermal Once Lanette Ulloa FNP        LIDOcaine (PF) 10 mg/ml (1%) injection 10 mg  1 mL Intradermal Once Shannon Milligan MD        ondansetron injection 4 mg  4 mg Intravenous Once Shannon Milligan MD        oxyCODONE-acetaminophen 5-325 mg per tablet 2 tablet  2 tablet Oral Once Shannon Milligan  MD THALIA        prochlorperazine injection Soln 5 mg  5 mg Intravenous Once PRN Shannon Milligan MD         Review of patient's allergies indicates:  No Known Allergies    ROS: 12 point review of systems negative other than the HPI    PHYSICAL EXAM:  Vitals:    03/18/24 0535 03/18/24 0602 03/18/24 0632 03/18/24 0701   BP:  95/70  100/71   Pulse: 84 91  101   Resp: 19  19 (!) 30   Temp:       TempSrc:       SpO2: 95%   98%   Weight:         No intake or output data in the 24 hours ending 03/18/24 0915    GEN: NAD  HEENT: normocephalic, atraumatic, PERRL  CV: RRR  RESP: Even and unlabored  ABD:  Soft, NT ND  :  No urine available for assessment.  He has diffuse scrotal edema, mostly on the left with thickened scrotal skin.  His scrotum is diffusely tender.  There is a superficial ulceration/slough tissue with some surrounding ecchymosis, no underlying fluctuance,palpable fluid collection or crepitus; serous drainage. Perineum difficult to examine d/t severe pain but no obvious skin breakdown or fluid collection.  EXT: no C/C/E  NEURO: no focal deficits, MAEW, AAOx4    LABS:  Recent Results (from the past 24 hour(s))   Comprehensive metabolic panel    Collection Time: 03/17/24  9:03 PM   Result Value Ref Range    Sodium Level 137 136 - 145 mmol/L    Potassium Level 3.5 3.5 - 5.1 mmol/L    Chloride 101 98 - 107 mmol/L    Carbon Dioxide 21 (L) 23 - 31 mmol/L    Glucose Level 99 82 - 115 mg/dL    Blood Urea Nitrogen 16.6 8.4 - 25.7 mg/dL    Creatinine 0.99 0.73 - 1.18 mg/dL    Calcium Level Total 7.4 (L) 8.8 - 10.0 mg/dL    Protein Total 5.4 (L) 5.8 - 7.6 gm/dL    Albumin Level 2.1 (L) 3.4 - 4.8 g/dL    Globulin 3.3 2.4 - 3.5 gm/dL    Albumin/Globulin Ratio 0.6 (L) 1.1 - 2.0 ratio    Bilirubin Total 1.0 <=1.5 mg/dL    Alkaline Phosphatase 76 40 - 150 unit/L    Alanine Aminotransferase 27 0 - 55 unit/L    Aspartate Aminotransferase 112 (H) 5 - 34 unit/L    eGFR >60 mls/min/1.73/m2   Brain natriuretic peptide     Collection Time: 03/17/24  9:03 PM   Result Value Ref Range    Natriuretic Peptide 1,273.3 (H) <=100.0 pg/mL   APTT    Collection Time: 03/17/24  9:03 PM   Result Value Ref Range    PTT 32.4 23.2 - 33.7 seconds   Protime-INR    Collection Time: 03/17/24  9:03 PM   Result Value Ref Range    PT 19.1 (H) 12.5 - 14.5 seconds    INR 1.6 (H) <=1.3   Magnesium    Collection Time: 03/17/24  9:03 PM   Result Value Ref Range    Magnesium Level 2.20 1.60 - 2.60 mg/dL   Troponin I    Collection Time: 03/17/24  9:03 PM   Result Value Ref Range    Troponin-I 0.063 (H) 0.000 - 0.045 ng/mL   CBC with Differential    Collection Time: 03/17/24  9:03 PM   Result Value Ref Range    WBC 15.53 (H) 4.50 - 11.50 x10(3)/mcL    RBC 3.33 (L) 4.70 - 6.10 x10(6)/mcL    Hgb 10.3 (L) 14.0 - 18.0 g/dL    Hct 31.8 (L) 42.0 - 52.0 %    MCV 95.5 (H) 80.0 - 94.0 fL    MCH 30.9 27.0 - 31.0 pg    MCHC 32.4 (L) 33.0 - 36.0 g/dL    RDW 15.9 11.5 - 17.0 %    Platelet 141 130 - 400 x10(3)/mcL    MPV 11.8 (H) 7.4 - 10.4 fL    NRBC% 0.0 %    IPF 12.5 (H) 0.9 - 11.2 %   Manual Differential    Collection Time: 03/17/24  9:03 PM   Result Value Ref Range    Neutrophils % 91 %    Lymphs % 4 %    Monocytes % 4 %    Neutrophils Abs      Poikilocytosis 1+ (A) (none)    Macrocytosis 1+ (A) (none)    [place] Cells 1+ (A) (none)   Lactic acid, plasma    Collection Time: 03/17/24  9:09 PM   Result Value Ref Range    Lactic Acid Level 2.9 (H) 0.5 - 2.2 mmol/L   Lactic Acid, Plasma    Collection Time: 03/17/24 11:02 PM   Result Value Ref Range    Lactic Acid Level 1.8 0.5 - 2.2 mmol/L   Urinalysis, Reflex to Urine Culture    Collection Time: 03/18/24 12:34 AM    Specimen: Urine   Result Value Ref Range    Color, UA Yellow Yellow, Light-Yellow, Colorless, Straw, Dark-Yellow    Appearance, UA Clear Clear    Specific Gravity, UA >1.050 (H) 1.005 - 1.030    pH, UA 6.5 5.0 - 8.5    Protein, UA 1+ (A) Negative    Glucose, UA 1+ (A) Negative, Normal    Ketones, UA Negative Negative     Blood, UA Trace (A) Negative    Bilirubin, UA Negative Negative    Urobilinogen, UA 2.0 (A) 0.2, 1.0, Normal    Nitrites, UA Negative Negative    Leukocyte Esterase,  (A) Negative    WBC, UA >100 (A) None Seen, 0-2, 3-5, 0-5 /HPF    Bacteria, UA Many (A) None Seen, Trace /HPF    Squamous Epithelial Cells, UA Trace None Seen /HPF    Mucous, UA Trace (A) None Seen /LPF    RBC, UA 11-20 (A) None Seen, 0-2, 3-5, 0-5 /HPF   Troponin I    Collection Time: 03/18/24  1:32 AM   Result Value Ref Range    Troponin-I 0.045 0.000 - 0.045 ng/mL   Comprehensive metabolic panel    Collection Time: 03/18/24  5:33 AM   Result Value Ref Range    Sodium Level 137 136 - 145 mmol/L    Potassium Level 3.3 (L) 3.5 - 5.1 mmol/L    Chloride 99 98 - 107 mmol/L    Carbon Dioxide 28 23 - 31 mmol/L    Glucose Level 99 82 - 115 mg/dL    Blood Urea Nitrogen 16.3 8.4 - 25.7 mg/dL    Creatinine 0.87 0.73 - 1.18 mg/dL    Calcium Level Total 7.4 (L) 8.8 - 10.0 mg/dL    Protein Total 5.2 (L) 5.8 - 7.6 gm/dL    Albumin Level 2.0 (L) 3.4 - 4.8 g/dL    Globulin 3.2 2.4 - 3.5 gm/dL    Albumin/Globulin Ratio 0.6 (L) 1.1 - 2.0 ratio    Bilirubin Total 0.8 <=1.5 mg/dL    Alkaline Phosphatase 73 40 - 150 unit/L    Alanine Aminotransferase 21 0 - 55 unit/L    Aspartate Aminotransferase 67 (H) 5 - 34 unit/L    eGFR >60 mls/min/1.73/m2   Troponin I    Collection Time: 03/18/24  5:33 AM   Result Value Ref Range    Troponin-I 0.055 (H) 0.000 - 0.045 ng/mL   CBC with Differential    Collection Time: 03/18/24  5:33 AM   Result Value Ref Range    WBC 11.97 (H) 4.50 - 11.50 x10(3)/mcL    RBC 3.40 (L) 4.70 - 6.10 x10(6)/mcL    Hgb 10.5 (L) 14.0 - 18.0 g/dL    Hct 32.4 (L) 42.0 - 52.0 %    MCV 95.3 (H) 80.0 - 94.0 fL    MCH 30.9 27.0 - 31.0 pg    MCHC 32.4 (L) 33.0 - 36.0 g/dL    RDW 15.8 11.5 - 17.0 %    Platelet 136 130 - 400 x10(3)/mcL    MPV 12.0 (H) 7.4 - 10.4 fL    NRBC% 0.0 %    IPF 12.9 (H) 0.9 - 11.2 %       IMAGING:  CTA Abdomen and Pelvis [[id number]]  Resulted: 03/17/24 2144   Order Status: Completed Updated: 03/17/24 2146   Narrative:     EXAMINATION:  CTA ABDOMEN AND PELVIS    CLINICAL HISTORY:  bleeding from rectum posterior scrotum, hx of AAA, severely enlarged scrotum;    TECHNIQUE:  Multidetector axial images were obtained of the abdomen and pelvis before and following the administration of IV contrast. Oral contrast was not administered.    Dose length product of 968 mGycm. Automated exposure control was utilized to minimize radiation dose.    COMPARISON:  CT brain October 25, 2023.    FINDINGS:  There is trace of right dependent pleural effusion.  Visualized portion of the lungs are without acute consolidation or congestive process.  Cardiac chambers are enlarged in size.    Hepatic volume and attenuation is unremarkable. Gallbladder wall is not thickened and there is no intra luminal calcified calculus. No biliary dilation identified. Pancreatic unremarkable attenuation without acute peripancreatic phlegmons. Main pancreatic duct is not dilated. Spleen is of normal size without focal lesion.    Bilateral adrenal glands mild thickening suggest hyperplasia with similar appearance..  There is left kidney upper pole scarring. There is no hydronephrosis.  There are similar mild perinephric strandings.    There is abdominal aortic aneurysmal dilatation with anterior aspect mural thrombus on image 114 series 15 where maximum diameter is 3.6 cm without significant interval change.  There are no signs of abdominal aortic dissection or periaortic inflammation or contrast extravasation.  Calcified plaques involve iliac arteries.  Flow is present bilaterally within the internal external iliac arteries to the femoral arteries.  There is no major vascular contrast extravasation.    Stomach is mostly decompressed.  No abnormal dilatation of the loops of small bowel.  There is no focal or generalized mural thickening of the small bowel loops.  There is moderate colonic  fecal loading without abnormal dilatation or pericolonic acute strandings.  There is no active contrast extravasation within colon, especially the anorectum to suggest active gastrointestinal hemorrhage.    There is large amount of hyperdense hemorrhagic fluid within the scrotum.  There is scrotal central aspect hypodensity with thin rim which measures 11.0 cm by 5.0 cm and may represent a hydrocele.  There is no active bleeding within the scrotum.  The cause for scrotal hemorrhage is not determined on this exam.  Testicles are not delineated as discrete structures.  Please further assess with ultrasound exam.  Urinary bladder is decompressed with slightly thickened wall.  There are multiple prostatic calcifications.  Bilateral inguinal fat containing hernias.  There are bilateral inguinal up to 1.2 cm enlarged lymph nodes.    No acute or otherwise osseous abnormality identified.   Impression:       1. Stable infrarenal abdominal aortic aneurysmal dilatation with mural thrombus.  No contrast extravasation from major abdominopelvic vasculature..    2. No active gastrointestinal hemorrhage identified.    3. Large amount of hyperdense hemorrhagic fluid within the scrotum without active bleeding.  The source of this hemorrhage is not determined on this exam.  This may be secondary to scrotal vasculature.  Please further assess with ultrasound exam with Doppler.      Electronically signed by: Mickey West  Date: 03/17/2024  Time: 21:44     US SCROTUM AND TESTICLES WITH DOPPLER (XPD) [[id number]] (Abnormal) Resulted: 03/18/24 0644   Order Status: Completed Updated: 03/18/24 0646   Narrative:     FINDINGS:  Scan is limited due to patient's pain level not allowing him to tolerate the scan.  There is pronounced edematous thickening of the superficial soft tissues of the scrotum with a thickness of 1.7 cm.  The right and left testicles are not clearly separately visualize.  There is a single 12.4 x 6.6 x 8 cm hypoechoic  structure in the midline (possibly representing testicle with no appreciable blood flow identified exact nature of these abnormality cannot be ascertained by this exam   Impression:       Limited scan due to significant pain.    Pronounce edematous thickening of the superficial soft tissues measure approximately 1.7 cm.    Inability to  the right and left testicles with a single hypoechoic structure in the midline with measurements as above and not appreciable blood flow exact nature of these abnormality cannot be completely ascertained by this exam changes suggestive of testicular torsion are not completely excluded clinical correlation is suggested.    This report was flagged in Epic as abnormal.      Electronically signed by: Lito Hurst  Date: 03/18/2024  Time: 06:44       ASSESSMENT:  -hydrocele/scrotal hematoma with superficial ulceration and drainage, severe pain  -sepsis - on zosyn and vancomycin, BC and UC pending  -NSTEMI  -AFib RVR, chronic AFib on Xarelto Xarelto currently on hold - [facility] consulted  -COPD, nonischemic cardiomyopathy    PLAN:  -Continue antibiotics, following cultures  -discussed with Dr. Rivas.  Plan is for scrotal exploration/I&D today.  Keep NPO.  Discussed with nursing    Qiana Valenzuela NP    Have seen and examined the patient; he needs emergent scrotal exploration; possible orchiectomy; debridement and packing.   Have discussed the seriousness of this situation with the patient.  Risks and benefits of surgery discussed.  Will proceed once an OR becomes available.  Fahad Rivas MD

## 2024-03-18 NOTE — ANESTHESIA POSTPROCEDURE EVALUATION
Anesthesia Post Evaluation    Patient: Ran Reyes Jr.    Procedure(s) Performed: Procedure(s) (LRB):  Incision and Drainage (N/A)  ORCHIECTOMY (Left)    Final Anesthesia Type: general      Patient location during evaluation: PACU  Patient participation: Yes- Able to Participate  Level of consciousness: oriented and awake  Post-procedure vital signs: reviewed and stable  Pain management: adequate  Airway patency: patent    PONV status at discharge: No PONV  Anesthetic complications: no      Cardiovascular status: stable and hemodynamically stable  Respiratory status: spontaneous ventilation and unassisted  Hydration status: euvolemic  Follow-up not needed.  Comments: [facility]              Vitals Value Taken Time   BP 97/63 03/18/24 1730   Temp 37.1 °C (98.8 °F) 03/18/24 1715   Pulse 93 03/18/24 1730   Resp 16 03/18/24 1730   SpO2 100% 03/18/24 1730   Vitals shown include unvalidated device data.      No case tracking events are documented in the log.      Pain/Riley Score: Pain Rating Prior to Med Admin: 6 (3/18/2024  6:32 AM)  Pain Rating Post Med Admin: 4 (3/18/2024  5:13 AM)  Riley Score: 8 (3/18/2024  5:45 PM)           No complaints

## 2024-03-18 NOTE — ANESTHESIA PROCEDURE NOTES
Intubation    Date/Time: 3/18/2024 2:23 PM    Performed by: Syed Deng CRNA  Authorized by: Syed Deng CRNA    Intubation:     Induction:  Intravenous    Intubated:  Postinduction    Mask Ventilation:  Easy mask    Attempts:  1    Attempted By:  CRNA    Difficult Airway Encountered?: No      Complications:  None    Airway Device:  Supraglottic airway/LMA    Airway Device Size:  5.0    Style/Cuff Inflation:  Cuffed (inflated to minimal occlusive pressure)    Secured at:  The lips    Placement Verified By:  Capnometry and Colorimetric ETCO2 device    Complicating Factors:  None    Findings Post-Intubation:  BS equal bilateral and atraumatic/condition of teeth unchanged

## 2024-03-18 NOTE — PROGRESS NOTES
"Pharmacokinetic Initial Assessment: IV Vancomycin    Assessment/Plan:    Initiate intravenous vancomycin with loading dose of 1500 mg once followed by a maintenance dose of vancomycin 1000mg IV every 12 hours  Desired empiric serum trough concentration is 15 to 20 mcg/mL  Draw vancomycin trough level 60 min prior to fourth dose on 03/19 at approximately 1000  Pharmacy will continue to follow and monitor vancomycin.      Please contact pharmacy at extension [phone] with any questions regarding this assessment.     Thank you for the consult,   Rigo Quijano       Patient brief summary:  Ran Reyes Jr. is a 68 y.o. male initiated on antimicrobial therapy with IV Vancomycin for treatment of suspected sepsis    Drug Allergies:   Review of patient's allergies indicates:  No Known Allergies    Actual Body Weight:   77kg    Renal Function:   Estimated Creatinine Clearance: 78.2 mL/min (based on SCr of 0.99 mg/dL).,     Dialysis Method (if applicable):  N/A    CBC (last 72 hours):  Recent Labs   Lab Result Units 03/17/24  2103   WBC x10(3)/mcL 15.53*   Hgb g/dL 10.3*   Hct % 31.8*   Platelet x10(3)/mcL 141   Monocytes % % 4       Metabolic Panel (last 72 hours):  Recent Labs   Lab Result Units 03/17/24  2103 03/18/24  0034   Sodium Level mmol/L 137  --    Potassium Level mmol/L 3.5  --    Chloride mmol/L 101  --    Carbon Dioxide mmol/L 21*  --    Glucose Level mg/dL 99  --    Glucose, UA   --  1+*   Blood Urea Nitrogen mg/dL 16.6  --    Creatinine mg/dL 0.99  --    Albumin Level g/dL 2.1*  --    Bilirubin Total mg/dL 1.0  --    Alkaline Phosphatase unit/L 76  --    Aspartate Aminotransferase unit/L 112*  --    Alanine Aminotransferase unit/L 27  --    Magnesium Level mg/dL 2.20  --        Drug levels (last 3 results):  No results for input(s): "VANCOMYCINRA", "VANCORANDOM", "VANCOMYCINPE", "VANCOPEAK", "VANCOMYCINTR", "VANCOTROUGH" in the last 72 hours.    Microbiologic Results:  Microbiology Results (last 7 days)       " Procedure Component Value Units Date/Time    Urine culture [[id number]] Collected: 03/18/24 0034    Order Status: Sent Specimen: Urine Updated: 03/18/24 0108    Blood culture #1 **CANNOT BE ORDERED STAT** [[id number]] Collected: 03/17/24 2103    Order Status: Resulted Specimen: Blood Updated: 03/17/24 2113    Blood culture #2 **CANNOT BE ORDERED STAT** [[id number]] Collected: 03/17/24 2103    Order Status: Resulted Specimen: Blood Updated: 03/17/24 2113

## 2024-03-18 NOTE — PROGRESS NOTES
Attempted to meet with patient to conduct initial cm dc planning assessment but nurse stated pt is being transported for surgery.

## 2024-03-18 NOTE — H&P
[facility] Hospital Medicine History & Physical Examination       Patient Name: Ran Reyes Jr.  MRN: [id number]  Patient Class: Emergency   Admission Date: 3/17/2024  8:41 PM  Length of Stay: 0  Admitting Service: Hospital Medicine   Attending Physician: Harris Hernandez MD   Primary Care Provider: No, Primary Doctor  History source: EMR, patient and/or patient's family    CHIEF COMPLAINT   Scrotal bleeding    HISTORY OF PRESENT ILLNESS:   Patient is a 68-year-old male with a history of nonischemic cardiomyopathy, systolic heart failure with an EF of 41%, VHD, chronic AFib on Xarelto, peripheral arterial disease, COPD, HTN, and a large left testicle hydrocele who presents to the ER complaining of what he thought was rectal bleeding as he was noting blood in the toilet and running down his legs.  Ultimately was found that the bleeding was coming from the posterior aspect of his scrotum from a superficial scrotal ulceration.  He also complained of persistent testicular swelling and pain.    On arrival to the ER was tachycardic in the 120s but afebrile, EKG showed AFib with RVR.  Blood pressure readings were borderline soft.  Laboratory work showed leukocytosis of 15 K and a drop in his hemoglobin from 14 in October to currently 10.3.  Initial lactic acid was 2.9, repeat 1.8.  BNP was in the 1200s and troponin was slightly bumped at 0.06.  CT abdomen and pelvis showed a large amount of hyperdense hemorrhagic fluid within the scrotum without active bleeding and recommended Doppler ultrasound.  Doppler ultrasound was significantly limited, but could not rule out the presence of torsion.  GI bleeding was excluded with a brown stool that was occult negative on exam in the ER.  Urology was consulted with plans to see the patient in consultation.    PAST MEDICAL HISTORY:   Nonischemic cardiomyopathy  (EF 41% 01/2024)   Valvular heart disease  Chronic atrial fibrillation on Xarelto    Peripheral arterial disease/stents  COPD, not on home oxygen   Essential hypertension  Hyperlipidemia    PAST SURGICAL HISTORY:     Past Surgical History:   Procedure Laterality Date    ATHERECTOMY OF PERIPHERAL VESSEL Left 09/12/2022    LEFT SFA ATHERECTOMY, BALLOON ANGIOPLASTY    CATARACT EXTRACTION W/  INTRAOCULAR LENS IMPLANT Left 3/9/2023    Procedure: EXTRACTION, CATARACT, WITH IOL INSERTION;  Surgeon: Georgi Borja MD;  Location: [facility];  Service: Ophthalmology;  Laterality: Left;  19.5  mac    Heart Stent N/A     > 10yrs. AGO    INSERTION OF STENT INTO PERIPHERAL VESSEL Right 10/17/2022    RIGHT SFA ATHERECTOMY, BALLOON ANGIOPLASTY, STENT; RIGHT ANTERIOR TIBIAL ARTERY ATHERECTOMY, BALLOON ANGIOPLASTY       ALLERGIES:   Patient has no known allergies.    FAMILY HISTORY:   Reviewed and non-contributory     SOCIAL HISTORY:     Social History     Tobacco Use    Smoking status: Every Day     Current packs/day: 0.25     Average packs/day: 0.3 packs/day for 40.0 years (10.0 ttl pk-yrs)     Types: Cigarettes     Passive exposure: Current    Smokeless tobacco: Never   Substance Use Topics    Alcohol use: Yes     Alcohol/week: 3.0 standard drinks of alcohol     Types: 3 Cans of beer per week     Comment: socially        HOME MEDICATIONS:     Prior to Admission medications    Medication Sig Start Date End Date Taking? Authorizing Provider   ADVAIR HFA 45-21 mcg/actuation HFAA inhaler Waiting on refill from Doctor 1/4/22   Provider, Historical   aspirin (ECOTRIN) 81 MG EC tablet Take 81 mg by mouth once daily. 6/2/22   Provider, Historical   cetirizine (ZYRTEC) 10 MG tablet Take 1 tablet (10 mg total) by mouth once daily. for 14 days 10/23/23 11/6/23  Jerald Rocha Jr., FNP   clopidogreL (PLAVIX) 75 mg tablet Take 1 tablet (75 mg total) by mouth once daily. 1/31/24   Dayanna Johnson MD   empagliflozin (JARDIANCE) 10 mg tablet Take 1 tablet (10 mg total) by mouth once daily. 1/31/24   Dayanna Johnson MD    flurbiprofen (OCUFEN) 0.03 % ophthalmic solution 1 drop once. Do not use while wearing contact lenses    Provider, Historical   fluticasone propionate (FLONASE) 50 mcg/actuation nasal spray 1 spray (50 mcg total) by Each Nostril route 2 (two) times daily as needed for Rhinitis. 10/23/23   Jerald Rocha Jr., FNP   furosemide (LASIX) 20 MG tablet Take 1 tablet (20 mg total) by mouth once daily. 1/31/24   Dayanna Johnson MD   HYDROcodone-acetaminophen (NORCO) 5-325 mg per tablet Take 1 tablet by mouth every 6 (six) hours as needed for Pain.  Patient not taking: Reported on 11/15/2023 9/20/23   Jo-Ann Parks DO   ipratropium (ATROVENT HFA) 17 mcg/actuation inhaler Inhale into the lungs. 11/17/21   Provider, Historical   levoFLOXacin (LEVAQUIN) 500 MG tablet Take 1 tablet (500 mg total) by mouth once daily.  Patient not taking: Reported on 11/15/2023 9/20/23   Jo-Ann Parks,    metoprolol succinate (TOPROL-XL) 25 MG 24 hr tablet Take 25 mg by mouth once daily. 3/2/22   Provider, Historical   moxifloxacin (VIGAMOX) 0.5 % ophthalmic solution 1 drop 3 (three) times daily.    Provider, Historical   nicotine (NICODERM CQ) 7 mg/24 hr Place 1 patch onto the skin once daily.  Patient not taking: Reported on 11/15/2023 1/12/23   Vignesh Guaman MD   nicotine polacrilex 4 MG Lozg Take 1 lozenge (4 mg total) by mouth as needed (withdrawl from nicotine).  Patient not taking: Reported on 11/15/2023 6/2/22   Cecy Schneider MD   prednisoLONE acetate (PRED FORTE) 1 % DrpS Place 1 drop into the left eye 3 (three) times daily.  Patient not taking: Reported on 11/15/2023 4/11/23 4/10/24  FriendAung MD   rivaroxaban (XARELTO) 20 mg Tab Take 20 mg by mouth. 12/30/21   Provider, Historical   sacubitriL-valsartan (ENTRESTO) 49-51 mg per tablet Take 1 tablet by mouth 2 (two) times daily. 1/31/24   Dayanna Johnson MD   simvastatin (ZOCOR) 40 MG tablet Take 1 tablet (40 mg total) by mouth every evening.  "1/31/24 1/25/25  Dayanna Johnson MD   spironolactone (ALDACTONE) 25 MG tablet Take 1 tablet (25 mg total) by mouth once daily. 1/31/24   Dayanna Johnson MD   tadalafiL (CIALIS) 20 MG Tab Take 1 tablet (20 mg total) by mouth daily as needed (prior to sexual activity).  Patient not taking: Reported on 1/4/2024 11/15/23 11/14/24  Jo-Ann Parks,    torsemide (DEMADEX) 10 MG Tab Take by mouth. 10/13/22   Provider, Historical       REVIEW OF SYSTEMS:   Except as documented, all other systems reviewed and negative     PHYSICAL EXAM:   T 99.5 °F (37.5 °C)   /68   P 90   RR 18   O2 (!) 94 %  GENERAL: awake, alert, oriented and in no acute distress, non-toxic appearing   HEENT: normocephalic atraumatic   NECK: supple   LUNGS: Clear bilaterally, no wheezing or rales, no accessory muscle use   CVS: irreg irreg, normal peripheral perfusion  ABD: Soft, non-tender, non-distended, bowel sounds present  EXTREMITIES: no clubbing or cyanosis  :  Significantly enlarged testicles, excoriation with minor bleeding on the posterior aspect the scrotum  SKIN: Warm, dry.   NEURO: alert and oriented, grossly without focal deficits   PSYCHIATRIC: Cooperative    LABS AND IMAGING:     Recent Labs     03/17/24 2103   WBC 15.53*   RBC 3.33*   HGB 10.3*   HCT 31.8*   MCV 95.5*   MCH 30.9   MCHC 32.4*   RDW 15.9        Recent Labs     03/17/24 2109 03/17/24  2302   LACTIC 2.9* 1.8     Recent Labs     03/17/24 2103   INR 1.6*     No results for input(s): "HGBA1C", "CHOL", "TRIG", "LDL", "VLDL", "HDL" in the last 72 hours.   Recent Labs     03/17/24 2103      K 3.5   CHLORIDE 101   CO2 21*   BUN 16.6   CREATININE 0.99   GLUCOSE 99   CALCIUM 7.4*   MG 2.20   ALBUMIN 2.1*   GLOBULIN 3.3   ALKPHOS 76   ALT 27   *   BILITOT 1.0     Recent Labs     03/17/24  2103   BNP 1,273.3*   TROPONINI 0.063*          CTA Abdomen and Pelvis  Narrative: EXAMINATION:  CTA ABDOMEN AND PELVIS    CLINICAL HISTORY:  bleeding from rectum " posterior scrotum, hx of AAA, severely enlarged scrotum;    TECHNIQUE:  Multidetector axial images were obtained of the abdomen and pelvis before and following the administration of IV contrast. Oral contrast was not administered.    Dose length product of 968 mGycm. Automated exposure control was utilized to minimize radiation dose.    COMPARISON:  CT brain October 25, 2023.    FINDINGS:  There is trace of right dependent pleural effusion.  Visualized portion of the lungs are without acute consolidation or congestive process.  Cardiac chambers are enlarged in size.    Hepatic volume and attenuation is unremarkable. Gallbladder wall is not thickened and there is no intra luminal calcified calculus. No biliary dilation identified. Pancreatic unremarkable attenuation without acute peripancreatic phlegmons. Main pancreatic duct is not dilated. Spleen is of normal size without focal lesion.    Bilateral adrenal glands mild thickening suggest hyperplasia with similar appearance..  There is left kidney upper pole scarring. There is no hydronephrosis.  There are similar mild perinephric strandings.    There is abdominal aortic aneurysmal dilatation with anterior aspect mural thrombus on image 114 series 15 where maximum diameter is 3.6 cm without significant interval change.  There are no signs of abdominal aortic dissection or periaortic inflammation or contrast extravasation.  Calcified plaques involve iliac arteries.  Flow is present bilaterally within the internal external iliac arteries to the femoral arteries.  There is no major vascular contrast extravasation.    Stomach is mostly decompressed.  No abnormal dilatation of the loops of small bowel.  There is no focal or generalized mural thickening of the small bowel loops.  There is moderate colonic fecal loading without abnormal dilatation or pericolonic acute strandings.  There is no active contrast extravasation within colon, especially the anorectum to suggest  active gastrointestinal hemorrhage.    There is large amount of hyperdense hemorrhagic fluid within the scrotum.  There is scrotal central aspect hypodensity with thin rim which measures 11.0 cm by 5.0 cm and may represent a hydrocele.  There is no active bleeding within the scrotum.  The cause for scrotal hemorrhage is not determined on this exam.  Testicles are not delineated as discrete structures.  Please further assess with ultrasound exam.  Urinary bladder is decompressed with slightly thickened wall.  There are multiple prostatic calcifications.  Bilateral inguinal fat containing hernias.  There are bilateral inguinal up to 1.2 cm enlarged lymph nodes.    No acute or otherwise osseous abnormality identified.  Impression: 1. Stable infrarenal abdominal aortic aneurysmal dilatation with mural thrombus.  No contrast extravasation from major abdominopelvic vasculature..    2. No active gastrointestinal hemorrhage identified.    3. Large amount of hyperdense hemorrhagic fluid within the scrotum without active bleeding.  The source of this hemorrhage is not determined on this exam.  This may be secondary to scrotal vasculature.  Please further assess with ultrasound exam with Doppler.    Electronically signed by: Mickey West  Date:    03/17/2024  Time:    21:44      ASSESSMENT & PLAN:   Scrotal ulceration/bleeding/hematoma  Sepsis, source likely urinary tract infection  Acute bronchitis with mild exacerbation COPD  NSTEMI type 2 demand ischemia 2/2 above  Nonischemic cardiomyopathy  (EF 41% 01/2024)   Valvular heart disease  Chronic atrial fibrillation on Xarelto   Peripheral arterial disease/stents  COPD, not on home oxygen   Essential hypertension  Hyperlipidemia    -blood cultures, urine cultures, broad-spectrum antibiotics  -hold antiplatelets/anticoagulants  -holding home antihypertensive/diuretics  -urology following  -tele monitoring, trend cardiac enzymes, cis following  -resume home medications as  appropriate pending med rec    DVT prophylaxis: scds  Code status: full     If patient was admitted under observational status it is with my approval/permission.     At least 55 min was spent on this history and physical.  Time seen: 1AM 3/18  Critical care time = 35 min; Critical care diagnosis = sepsis  Harris Hernandez MD

## 2024-03-18 NOTE — ED PROVIDER NOTES
"Encounter Date: 3/17/2024    SCRIBE #1 NOTE: I, Tiffanie Stearns, am scribing for, and in the presence of,  Navid Cifuentes MD. I have scribed the following portions of the note - Other sections scribed: HPI, ROS, PE.       History     Chief Complaint   Patient presents with    Rectal Bleeding     Rectal bleeding that started today, reports bright red blood when coughing. Reports left lower quadrant abdominal pain. Hx of aortic aneurysm. +BT     68 year old male with history of Afib, AAA, CHF, COPD, CAD, HLD, HTN, PAD, and mitral regurgitation presents to the ED with complaints of testicular swelling and bleeding. History from pt is limited due to pt being a poor historian. Pt notes that he has had scrotal swelling for a "while." He states that he was taken off of anticoagulants and does not know why. He complains of scrotal pain and notes that the bleeding is new.     The history is provided by the patient. No  was used.     Review of patient's allergies indicates:  No Known Allergies  Past Medical History:   Diagnosis Date    A-fib     Anticoagulant long-term use     Aortic aneurysm     Cataract     CHF (congestive heart failure)     Chronic atrial fibrillation     COPD (chronic obstructive pulmonary disease)     Coronary artery disease     HLD (hyperlipidemia)     Hypertension     Mitral regurgitation     PAD (peripheral artery disease)     Primary open angle glaucoma (POAG)      Past Surgical History:   Procedure Laterality Date    ATHERECTOMY OF PERIPHERAL VESSEL Left 09/12/2022    LEFT SFA ATHERECTOMY, BALLOON ANGIOPLASTY    CATARACT EXTRACTION W/  INTRAOCULAR LENS IMPLANT Left 3/9/2023    Procedure: EXTRACTION, CATARACT, WITH IOL INSERTION;  Surgeon: Georgi Borja MD;  Location: [facility];  Service: Ophthalmology;  Laterality: Left;  19.5  mac    Heart Stent N/A     > 10yrs. AGO    INSERTION OF STENT INTO PERIPHERAL VESSEL Right 10/17/2022    RIGHT SFA ATHERECTOMY, BALLOON ANGIOPLASTY, " STENT; RIGHT ANTERIOR TIBIAL ARTERY ATHERECTOMY, BALLOON ANGIOPLASTY     Family History   Problem Relation Age of Onset    Cancer Mother     Hypertension Mother     Heart failure Father     Stroke Father     Hypertension Father     No Known Problems Sister      Social History     Tobacco Use    Smoking status: Every Day     Current packs/day: 0.25     Average packs/day: 0.3 packs/day for 40.0 years (10.0 ttl pk-yrs)     Types: Cigarettes     Passive exposure: Current    Smokeless tobacco: Never   Substance Use Topics    Alcohol use: Yes     Alcohol/week: 3.0 standard drinks of alcohol     Types: 3 Cans of beer per week     Comment: socially    Drug use: Yes     Frequency: 1.0 times per week     Types: Marijuana     Comment: no use in over 2 months     Review of Systems   Genitourinary:  Positive for scrotal swelling (+bleeding).       Physical Exam     Initial Vitals [03/17/24 2036]   BP Pulse Resp Temp SpO2   124/87 (!) 120 18 99.5 °F (37.5 °C) (!) 92 %      MAP       --         Physical Exam    Nursing note and vitals reviewed.  Constitutional: He appears well-developed. No distress.   HENT:   Head: Normocephalic and atraumatic.   Mouth/Throat: Oropharynx is clear and moist.   Eyes: Conjunctivae and EOM are normal. Pupils are equal, round, and reactive to light.   Neck: Neck supple.   Cardiovascular:  Normal rate and regular rhythm.           No murmur heard.  Pulmonary/Chest: Breath sounds normal. No respiratory distress. He exhibits no tenderness.   Abdominal: Abdomen is soft. Bowel sounds are normal. He exhibits no distension. There is no abdominal tenderness.   Genitourinary:    Genitourinary Comments: Ulceration to back of scrotum that is severely enlarged and leaking blood. No crepitus around scrotum or perineum. No blood near rectum. Rectal exam: brown stool, no blood.      Musculoskeletal:         General: Normal range of motion.      Cervical back: Neck supple.      Lumbar back: Normal. No tenderness.  Normal range of motion.     Neurological: He is alert and oriented to person, place, and time. He has normal strength. No cranial nerve deficit or sensory deficit.   Psychiatric: He has a normal mood and affect. Judgment normal.         ED Course   Critical Care    Date/Time: 3/17/2024 11:52 PM    Performed by: Navid Cifuentes MD  Authorized by: Navid Cifuentes MD  Total critical care time (exclusive of procedural time) : 0 minutes  Critical care time was exclusive of teaching time and separately billable procedures and treating other patients.  Critical care was time spent personally by me on the following activities: discussions with primary provider, discussions with consultants, evaluation of patient's response to treatment, ordering and review of laboratory studies, ordering and review of radiographic studies, re-evaluation of patient's condition and examination of patient.  Comments: AFib with RVR anemia not requiring transfusion but a 4 point drop in hemoglobin, elevated troponin likely secondary to AFib with RVR now resolved.  Borderline hypotension with the elevated BNP, scrotal hematoma possibly sirs criteria/sepsis.  Conservative fluid management given history of CHF        Labs Reviewed   COMPREHENSIVE METABOLIC PANEL - Abnormal; Notable for the following components:       Result Value    Carbon Dioxide 21 (*)     Calcium Level Total 7.4 (*)     Protein Total 5.4 (*)     Albumin Level 2.1 (*)     Albumin/Globulin Ratio 0.6 (*)     Aspartate Aminotransferase 112 (*)     All other components within normal limits   B-TYPE NATRIURETIC PEPTIDE - Abnormal; Notable for the following components:    Natriuretic Peptide 1,273.3 (*)     All other components within normal limits   PROTIME-INR - Abnormal; Notable for the following components:    PT 19.1 (*)     INR 1.6 (*)     All other components within normal limits   TROPONIN I - Abnormal; Notable for the following components:    Troponin-I 0.063 (*)      All other components within normal limits   URINALYSIS, REFLEX TO URINE CULTURE - Abnormal; Notable for the following components:    Specific Gravity, UA >1.050 (*)     Protein, UA 1+ (*)     Glucose, UA 1+ (*)     Blood, UA Trace (*)     Urobilinogen, UA 2.0 (*)     Leukocyte Esterase,  (*)     WBC, UA >100 (*)     Bacteria, UA Many (*)     Mucous, UA Trace (*)     RBC, UA 11-20 (*)     All other components within normal limits   CBC WITH DIFFERENTIAL - Abnormal; Notable for the following components:    WBC 15.53 (*)     RBC 3.33 (*)     Hgb 10.3 (*)     Hct 31.8 (*)     MCV 95.5 (*)     MCHC 32.4 (*)     MPV 11.8 (*)     IPF 12.5 (*)     All other components within normal limits   LACTIC ACID, PLASMA - Abnormal; Notable for the following components:    Lactic Acid Level 2.9 (*)     All other components within normal limits   MANUAL DIFFERENTIAL - Abnormal; Notable for the following components:    Poikilocytosis 1+ (*)     Macrocytosis 1+ (*)     Yoel Cells 1+ (*)     All other components within normal limits   APTT - Normal   MAGNESIUM - Normal   LACTIC ACID, PLASMA - Normal   BLOOD CULTURE [facility]   BLOOD CULTURE [facility]   CULTURE, URINE   CBC W/ AUTO DIFFERENTIAL    Narrative:     The following orders were created for panel order CBC auto differential.  Procedure                               Abnormality         Status                     ---------                               -----------         ------                     CBC with Differential[[id number]]       Abnormal            Final result               Manual Differential[[id number]]         Abnormal            Final result                 Please view results for these tests on the individual orders.     EKG Readings: (Independently Interpreted)   Initial Reading: No STEMI.   EKG rate of 103 atrial fibrillation with RVR flattening of the T-waves laterally no STEMI       Imaging Results              US SCROTUM AND TESTICLES WITH DOPPLER (XPD) (Preliminary  result)  Result time 03/18/24 00:41:35   Procedure changed from US Scrotum And Testicles     Preliminary result by Gilberto rGaff MD (03/18/24 00:41:35)                   Narrative:    START OF REPORT:  Technique: Limited scrotal ultrasound was performed.    Comparison: None.    Clinical history: Scrotal swelling.    Findings:  Scrotum: Limited scan due to patient's pain level not allowing him to tolerate the scan. There is pronounced edematous thickening of the superficial soft tissues of the scrotum measuring 1.6 by centimetre in thickness. The right and left testis are not separately visualised. A single 12.38 x 6.57 x 7.99 cm hypoechoic structure is noted in the midline (possibly the testicle(s) ) with no appreciable blood flow identified.  Miscellaneous: The results were discussed with the emergency room physician (Dr Cifuentes) prior to dictation at 2024-03-18 00:41:21 CDT.      Impression:  1. Limited scan due to patient's pain level not allowing him to tolerate the scan. There is pronounced edematous thickening of the superficial soft tissues of the scrotum measuring 1.6 by centimetre in thickness. The right and left testis are not separately visualised. A single 12.38 x 6.57 x 7.99 cm hypoechoic structure is noted in the midline (possibly the testicle(s) ) with no appreciable blood flow identified. Correlate with clinical and laboratory findings as regards additional evaluation and follow-up as although the identified structure without flow is somewhat indeterminate the possibility of testicular torsion is not entirely excluded.  2. Details as above.                                         X-Ray Chest 1 View (In process)                      CTA Abdomen and Pelvis (Final result)  Result time 03/17/24 21:44:01      Final result by Mickey West MD (03/17/24 21:44:01)                   Impression:      1. Stable infrarenal abdominal aortic aneurysmal dilatation with mural thrombus.  No contrast  extravasation from major abdominopelvic vasculature..    2. No active gastrointestinal hemorrhage identified.    3. Large amount of hyperdense hemorrhagic fluid within the scrotum without active bleeding.  The source of this hemorrhage is not determined on this exam.  This may be secondary to scrotal vasculature.  Please further assess with ultrasound exam with Doppler.      Electronically signed by: Mickey West  Date:    03/17/2024  Time:    21:44               Narrative:    EXAMINATION:  CTA ABDOMEN AND PELVIS    CLINICAL HISTORY:  bleeding from rectum posterior scrotum, hx of AAA, severely enlarged scrotum;    TECHNIQUE:  Multidetector axial images were obtained of the abdomen and pelvis before and following the administration of IV contrast. Oral contrast was not administered.    Dose length product of 968 mGycm. Automated exposure control was utilized to minimize radiation dose.    COMPARISON:  CT brain October 25, 2023.    FINDINGS:  There is trace of right dependent pleural effusion.  Visualized portion of the lungs are without acute consolidation or congestive process.  Cardiac chambers are enlarged in size.    Hepatic volume and attenuation is unremarkable. Gallbladder wall is not thickened and there is no intra luminal calcified calculus. No biliary dilation identified. Pancreatic unremarkable attenuation without acute peripancreatic phlegmons. Main pancreatic duct is not dilated. Spleen is of normal size without focal lesion.    Bilateral adrenal glands mild thickening suggest hyperplasia with similar appearance..  There is left kidney upper pole scarring. There is no hydronephrosis.  There are similar mild perinephric strandings.    There is abdominal aortic aneurysmal dilatation with anterior aspect mural thrombus on image 114 series 15 where maximum diameter is 3.6 cm without significant interval change.  There are no signs of abdominal aortic dissection or periaortic inflammation or contrast  extravasation.  Calcified plaques involve iliac arteries.  Flow is present bilaterally within the internal external iliac arteries to the femoral arteries.  There is no major vascular contrast extravasation.    Stomach is mostly decompressed.  No abnormal dilatation of the loops of small bowel.  There is no focal or generalized mural thickening of the small bowel loops.  There is moderate colonic fecal loading without abnormal dilatation or pericolonic acute strandings.  There is no active contrast extravasation within colon, especially the anorectum to suggest active gastrointestinal hemorrhage.    There is large amount of hyperdense hemorrhagic fluid within the scrotum.  There is scrotal central aspect hypodensity with thin rim which measures 11.0 cm by 5.0 cm and may represent a hydrocele.  There is no active bleeding within the scrotum.  The cause for scrotal hemorrhage is not determined on this exam.  Testicles are not delineated as discrete structures.  Please further assess with ultrasound exam.  Urinary bladder is decompressed with slightly thickened wall.  There are multiple prostatic calcifications.  Bilateral inguinal fat containing hernias.  There are bilateral inguinal up to 1.2 cm enlarged lymph nodes.    No acute or otherwise osseous abnormality identified.                                       Medications   diltiaZEM injection 10 mg (10 mg Intravenous Not Given 3/17/24 2257)   lactated ringers bolus 1,000 mL (0 mLs Intravenous Stopped 3/17/24 2204)   iohexoL (OMNIPAQUE 350) injection 100 mL (100 mLs Intravenous Given 3/17/24 2117)   vancomycin 1.5 g in dextrose 5 % 250 mL IVPB (ready to mix) (0 mg Intravenous Stopped 3/18/24 0101)   piperacillin-tazobactam (ZOSYN) 4.5 g in dextrose 5 % in water (D5W) 100 mL IVPB (MB+) (0 g Intravenous Stopped 3/17/24 2329)   lactated ringers bolus 1,000 mL (0 mLs Intravenous Stopped 3/18/24 0030)   acetaminophen tablet 1,000 mg (1,000 mg Oral Given 3/17/24 2354)    fentaNYL injection 50 mcg (50 mcg Intravenous Given 3/17/24 3713)     Medical Decision Making  Differential diagnosis includes but is not limited to Claudine gangrene, scrotal varices, lower GI bleed, aortoenteric fistula       Amount and/or Complexity of Data Reviewed  External Data Reviewed: notes.     Details: Cardiology note shows that pt has cardiomyopathy and Afib with EF of 41%.    Note from November 2023 shows large left sided hydrocele that began in August.   Labs: ordered.  Radiology: ordered.    Risk  OTC drugs.  Prescription drug management.  Decision regarding hospitalization.              Attending Attestation:           Physician Attestation for Scribe:  Physician Attestation Statement for Scribe #1: I, Navid Cifuentes MD, reviewed documentation, as scribed by Tiffanie Stearns in my presence, and it is both accurate and complete.             ED Course as of 03/18/24 0110   Sun Mar 17, 2024   2303 Paged urology and cardiology.  [TB]   9034 I spoke with Cardiology they are okay with stopping his blood thinners for now he is on him for AFib and peripheral vascular disease.  They are not too worried about the elevated troponins since the patient did have AFib with RVR earlier.  Spoke with Urology they will be on the case and reviewed the ultrasound and CT scan tomorrow.  No specific recommendations okay with antibiotics though.  Hospitalist paged 11:52 p.m. [NL]   Mon Mar 18, 2024   0109 Spoke with Dr. Nieves again regarding ultrasound findings patient has been having symptoms for about a week and a half there is no plan to take him to the operating room tonight. [NL]      ED Course User Index  [NL] Navid Cifuentes MD  [TB] Tiffanie Stearns                           Clinical Impression:  Final diagnoses:  [R60.9] Swelling  [S30.22XA] Scrotal hematoma  [D64.9] Anemia, unspecified type  [I48.91] Atrial fibrillation with RVR  [R79.89] Elevated troponin          ED Disposition Condition    Admit                  Navid Cifuentes MD  03/17/24 9115       Navid Cifuentes MD  03/18/24 0007       Navid Cifuentes MD  03/18/24 0110

## 2024-03-19 LAB
ABS NEUT (OLG): 8.67 X10(3)/MCL (ref 2.1–9.2)
ABS NEUT (OLG): 8.74 X10(3)/MCL (ref 2.1–9.2)
ALBUMIN SERPL-MCNC: 2.3 G/DL (ref 3.4–4.8)
ALBUMIN SERPL-MCNC: 2.3 G/DL (ref 3.4–4.8)
ALBUMIN/GLOB SERPL: 0.7 RATIO (ref 1.1–2)
ALBUMIN/GLOB SERPL: 0.8 RATIO (ref 1.1–2)
ALLENS TEST BLOOD GAS (OHS): YES
ALP SERPL-CCNC: 61 UNIT/L (ref 40–150)
ALP SERPL-CCNC: 75 UNIT/L (ref 40–150)
ALT SERPL-CCNC: 15 UNIT/L (ref 0–55)
ALT SERPL-CCNC: 16 UNIT/L (ref 0–55)
AMPHET UR QL SCN: NEGATIVE
AMYLASE SERPL-CCNC: 35 UNIT/L (ref 25–125)
ANION GAP SERPL CALC-SCNC: 12 MEQ/L
ANISOCYTOSIS BLD QL SMEAR: ABNORMAL
ANISOCYTOSIS BLD QL SMEAR: ABNORMAL
APPEARANCE UR: CLEAR
AST SERPL-CCNC: 28 UNIT/L (ref 5–34)
AST SERPL-CCNC: 32 UNIT/L (ref 5–34)
AV INDEX (PROSTH): 0.57
AV MEAN GRADIENT: 3 MMHG
AV PEAK GRADIENT: 7 MMHG
AV VALVE AREA BY VELOCITY RATIO: 2.2 CM²
AV VALVE AREA: 2.15 CM²
AV VELOCITY RATIO: 0.58
B PERT.PT PRMT NPH QL NAA+NON-PROBE: NOT DETECTED
B-OH-BUTYR SERPL-MCNC: 1 MMOL/L
BACTERIA #/AREA URNS AUTO: ABNORMAL /HPF
BACTERIA UR CULT: ABNORMAL
BACTERIA UR CULT: ABNORMAL
BARBITURATE SCN PRESENT UR: NEGATIVE
BASE EXCESS BLD CALC-SCNC: 5.6 MMOL/L
BENZODIAZ UR QL SCN: NEGATIVE
BILIRUB SERPL-MCNC: 0.5 MG/DL
BILIRUB SERPL-MCNC: 0.8 MG/DL
BILIRUB UR QL STRIP.AUTO: NEGATIVE
BLOOD GAS SAMPLE TYPE (OHS): ABNORMAL
BUN SERPL-MCNC: 15.3 MG/DL (ref 8.4–25.7)
BUN SERPL-MCNC: 16.2 MG/DL (ref 8.4–25.7)
BUN SERPL-MCNC: 18.9 MG/DL (ref 8.4–25.7)
C PNEUM DNA NPH QL NAA+NON-PROBE: NOT DETECTED
C TRACH DNA SPEC QL NAA+PROBE: NOT DETECTED
CA-I BLD-SCNC: 1.11 MMOL/L (ref 1.12–1.23)
CALCIUM SERPL-MCNC: 7.6 MG/DL (ref 8.8–10)
CALCIUM SERPL-MCNC: 7.7 MG/DL (ref 8.8–10)
CALCIUM SERPL-MCNC: 8.1 MG/DL (ref 8.8–10)
CANNABINOIDS UR QL SCN: NEGATIVE
CHLORIDE SERPL-SCNC: 101 MMOL/L (ref 98–107)
CHLORIDE SERPL-SCNC: 101 MMOL/L (ref 98–107)
CHLORIDE SERPL-SCNC: 95 MMOL/L (ref 98–107)
CO2 BLDA-SCNC: 32 MMOL/L
CO2 SERPL-SCNC: 18 MMOL/L (ref 23–31)
CO2 SERPL-SCNC: 24 MMOL/L (ref 23–31)
CO2 SERPL-SCNC: 25 MMOL/L (ref 23–31)
COCAINE UR QL SCN: NEGATIVE
COLOR UR AUTO: YELLOW
CREAT SERPL-MCNC: 0.81 MG/DL (ref 0.73–1.18)
CREAT SERPL-MCNC: 0.83 MG/DL (ref 0.73–1.18)
CREAT SERPL-MCNC: 1.16 MG/DL (ref 0.73–1.18)
CREAT/UREA NIT SERPL: 20
CV ECHO LV RWT: 0.42 CM
DOP CALC AO PEAK VEL: 1.28 M/S
DOP CALC AO VTI: 21.7 CM
DOP CALC LVOT AREA: 3.8 CM2
DOP CALC LVOT DIAMETER: 2.2 CM
DOP CALC LVOT PEAK VEL: 0.74 M/S
DOP CALC LVOT STROKE VOLUME: 46.73 CM3
DOP CALC MV VTI: 19.1 CM
DOP CALCLVOT PEAK VEL VTI: 12.3 CM
DRAWN BY BLOOD GAS (OHS): ABNORMAL
E WAVE DECELERATION TIME: 209 MSEC
E/A RATIO: 4.72
E/E' RATIO: 13.88 M/S
ECHO LV POSTERIOR WALL: 1.19 CM (ref 0.6–1.1)
ERYTHROCYTE [DISTWIDTH] IN BLOOD BY AUTOMATED COUNT: 16.1 % (ref 11.5–17)
ERYTHROCYTE [DISTWIDTH] IN BLOOD BY AUTOMATED COUNT: 16.1 % (ref 11.5–17)
FENTANYL UR QL SCN: POSITIVE
FLUAV AG UPPER RESP QL IA.RAPID: NOT DETECTED
FLUBV AG UPPER RESP QL IA.RAPID: NOT DETECTED
FRACTIONAL SHORTENING: 17 % (ref 28–44)
GFR SERPLBLD CREATININE-BSD FMLA CKD-EPI: >60 MLS/MIN/1.73/M2
GIANT PLATELETS: ABNORMAL
GLOBULIN SER-MCNC: 2.9 GM/DL (ref 2.4–3.5)
GLOBULIN SER-MCNC: 3.4 GM/DL (ref 2.4–3.5)
GLUCOSE SERPL-MCNC: 157 MG/DL (ref 82–115)
GLUCOSE SERPL-MCNC: 217 MG/DL (ref 82–115)
GLUCOSE SERPL-MCNC: 515 MG/DL (ref 82–115)
GLUCOSE UR QL STRIP.AUTO: ABNORMAL
HADV DNA NPH QL NAA+NON-PROBE: NOT DETECTED
HAPTOGLOB SERPL-MCNC: 347 MG/DL (ref 40–368)
HAV IGM SERPL QL IA: NONREACTIVE
HBV CORE IGM SERPL QL IA: NONREACTIVE
HBV SURFACE AG SERPL QL IA: NONREACTIVE
HCO3 BLDA-SCNC: 30.6 MMOL/L (ref 22–26)
HCOV 229E RNA NPH QL NAA+NON-PROBE: NOT DETECTED
HCOV HKU1 RNA NPH QL NAA+NON-PROBE: NOT DETECTED
HCOV NL63 RNA NPH QL NAA+NON-PROBE: NOT DETECTED
HCOV OC43 RNA NPH QL NAA+NON-PROBE: NOT DETECTED
HCT VFR BLD AUTO: 26.9 % (ref 42–52)
HCT VFR BLD AUTO: 27.5 % (ref 42–52)
HCV AB SERPL QL IA: NONREACTIVE
HGB BLD-MCNC: 8.7 G/DL (ref 14–18)
HGB BLD-MCNC: 8.9 G/DL (ref 14–18)
HIV 1+2 AB+HIV1 P24 AG SERPL QL IA: NONREACTIVE
HMPV RNA NPH QL NAA+NON-PROBE: DETECTED
HPIV1 RNA NPH QL NAA+NON-PROBE: NOT DETECTED
HPIV2 RNA NPH QL NAA+NON-PROBE: NOT DETECTED
HPIV3 RNA NPH QL NAA+NON-PROBE: NOT DETECTED
HPIV4 RNA NPH QL NAA+NON-PROBE: NOT DETECTED
INHALED O2 CONCENTRATION: 30 %
INSTRUMENT WBC (OLG): 9.42 X10(3)/MCL
INSTRUMENT WBC (OLG): 9.93 X10(3)/MCL
INTERVENTRICULAR SEPTUM: 1.16 CM (ref 0.6–1.1)
KETONES UR QL STRIP.AUTO: ABNORMAL
LACTATE SERPL-SCNC: 0.9 MMOL/L (ref 0.5–2.2)
LACTATE SERPL-SCNC: 1.3 MMOL/L (ref 0.5–2.2)
LEFT ATRIUM SIZE: 4.9 CM
LEFT ATRIUM VOLUME MOD: 117 CM3
LEFT INTERNAL DIMENSION IN SYSTOLE: 4.63 CM (ref 2.1–4)
LEFT VENTRICLE DIASTOLIC VOLUME: 154 ML
LEFT VENTRICLE SYSTOLIC VOLUME: 98.8 ML
LEFT VENTRICULAR INTERNAL DIMENSION IN DIASTOLE: 5.61 CM (ref 3.5–6)
LEFT VENTRICULAR MASS: 273.33 G
LEUKOCYTE ESTERASE UR QL STRIP.AUTO: 250
LIPASE SERPL-CCNC: 5 U/L
LV LATERAL E/E' RATIO: 11.8 M/S
LV SEPTAL E/E' RATIO: 16.86 M/S
LVOT MG: 1 MMHG
LVOT MV: 0.45 CM/S
LYMPHOCYTES NFR BLD MANUAL: 0.38 X10(3)/MCL
LYMPHOCYTES NFR BLD MANUAL: 0.5 X10(3)/MCL
LYMPHOCYTES NFR BLD MANUAL: 4 %
LYMPHOCYTES NFR BLD MANUAL: 5 %
M PNEUMO DNA NPH QL NAA+NON-PROBE: NOT DETECTED
MACROCYTES BLD QL SMEAR: ABNORMAL
MACROCYTES BLD QL SMEAR: ABNORMAL
MAGNESIUM SERPL-MCNC: 2.3 MG/DL (ref 1.6–2.6)
MAGNESIUM SERPL-MCNC: 2.3 MG/DL (ref 1.6–2.6)
MAGNESIUM SERPL-MCNC: 2.4 MG/DL (ref 1.6–2.6)
MCH RBC QN AUTO: 30.2 PG (ref 27–31)
MCH RBC QN AUTO: 31.6 PG (ref 27–31)
MCHC RBC AUTO-ENTMCNC: 31.6 G/DL (ref 33–36)
MCHC RBC AUTO-ENTMCNC: 33.1 G/DL (ref 33–36)
MCV RBC AUTO: 95.4 FL (ref 80–94)
MCV RBC AUTO: 95.5 FL (ref 80–94)
MDMA UR QL SCN: NEGATIVE
MECH RR (OHS): 18 B/MIN
METAMYELOCYTES NFR BLD MANUAL: 1 %
MODE (OHS): ABNORMAL
MONOCYTES NFR BLD MANUAL: 0.38 X10(3)/MCL (ref 0.1–1.3)
MONOCYTES NFR BLD MANUAL: 0.6 X10(3)/MCL (ref 0.1–1.3)
MONOCYTES NFR BLD MANUAL: 4 %
MONOCYTES NFR BLD MANUAL: 6 %
MRSA PCR SCRN (OHS): NOT DETECTED
MUCOUS THREADS URNS QL MICRO: ABNORMAL /LPF
MV MEAN GRADIENT: 3 MMHG
MV PEAK A VEL: 0.25 M/S
MV PEAK E VEL: 1.18 M/S
MV PEAK GRADIENT: 5 MMHG
MV STENOSIS PRESSURE HALF TIME: 45 MS
MV VALVE AREA BY CONTINUITY EQUATION: 2.45 CM2
MV VALVE AREA P 1/2 METHOD: 4.89 CM2
N GONORRHOEA DNA SPEC QL NAA+PROBE: NOT DETECTED
NEUTROPHILS NFR BLD MANUAL: 88 %
NEUTROPHILS NFR BLD MANUAL: 92 %
NITRITE UR QL STRIP.AUTO: NEGATIVE
NRBC BLD AUTO-RTO: 0 %
NRBC BLD AUTO-RTO: 0 %
OHS LV EJECTION FRACTION SIMPSONS BIPLANE MOD: 39 %
OHS QRS DURATION: 96 MS
OHS QRS DURATION: 98 MS
OHS QTC CALCULATION: 482 MS
OHS QTC CALCULATION: 573 MS
OPIATES UR QL SCN: POSITIVE
OXYGEN DEVICE BLOOD GAS (OHS): ABNORMAL
PCO2 BLDA: 45 MMHG (ref 35–45)
PCP UR QL: NEGATIVE
PEEP RESPIRATORY: 5 CMH2O
PH BLDA: 7.44 [PH] (ref 7.35–7.45)
PH UR STRIP.AUTO: 6 [PH]
PH UR: 6 [PH] (ref 3–11)
PHOSPHATE SERPL-MCNC: 2.2 MG/DL (ref 2.3–4.7)
PHOSPHATE SERPL-MCNC: 3.2 MG/DL (ref 2.3–4.7)
PHOSPHATE SERPL-MCNC: 3.3 MG/DL (ref 2.3–4.7)
PISA MRMAX VEL: 4.63 M/S
PISA TR MAX VEL: 2.38 M/S
PLATELET # BLD AUTO: 134 X10(3)/MCL (ref 130–400)
PLATELET # BLD AUTO: 151 X10(3)/MCL (ref 130–400)
PLATELET # BLD EST: NORMAL 10*3/UL
PLATELET # BLD EST: NORMAL 10*3/UL
PLATELETS.RETICULATED NFR BLD AUTO: 10.9 % (ref 0.9–11.2)
PMV BLD AUTO: 11.9 FL (ref 7.4–10.4)
PMV BLD AUTO: 11.9 FL (ref 7.4–10.4)
PO2 BLDA: 96 MMHG (ref 80–100)
POCT GLUCOSE: 182 MG/DL (ref 70–110)
POCT GLUCOSE: 202 MG/DL (ref 70–110)
POCT GLUCOSE: 239 MG/DL (ref 70–110)
POIKILOCYTOSIS BLD QL SMEAR: ABNORMAL
POTASSIUM BLOOD GAS (OHS): 3.7 MMOL/L (ref 3.5–5)
POTASSIUM SERPL-SCNC: 3 MMOL/L (ref 3.5–5.1)
POTASSIUM SERPL-SCNC: 3.4 MMOL/L (ref 3.5–5.1)
POTASSIUM SERPL-SCNC: 4.2 MMOL/L (ref 3.5–5.1)
PROT SERPL-MCNC: 5.2 GM/DL (ref 5.8–7.6)
PROT SERPL-MCNC: 5.7 GM/DL (ref 5.8–7.6)
PROT UR QL STRIP.AUTO: ABNORMAL
PS (OHS): 10 CMH2O
PV PEAK GRADIENT: 6 MMHG
PV PEAK VELOCITY: 1.25 M/S
RA MAJOR: 6.54 CM
RA WIDTH: 6.18 CM
RBC # BLD AUTO: 2.82 X10(6)/MCL (ref 4.7–6.1)
RBC # BLD AUTO: 2.88 X10(6)/MCL (ref 4.7–6.1)
RBC #/AREA URNS AUTO: ABNORMAL /HPF
RBC MORPH BLD: ABNORMAL
RBC MORPH BLD: ABNORMAL
RBC UR QL AUTO: ABNORMAL
RSV A 5' UTR RNA NPH QL NAA+PROBE: NOT DETECTED
RSV RNA NPH QL NAA+NON-PROBE: NOT DETECTED
RV MID DIAMA: 3.6 CM
RV TISSUE DOPPLER FREE WALL SYSTOLIC VELOCITY 1 (APICAL 4 CHAMBER VIEW): 11 CM/S
RV+EV RNA NPH QL NAA+NON-PROBE: NOT DETECTED
SAMPLE SITE BLOOD GAS (OHS): ABNORMAL
SAO2 % BLDA: 98 %
SARS-COV-2 RNA RESP QL NAA+PROBE: NOT DETECTED
SODIUM BLOOD GAS (OHS): 134 MMOL/L (ref 137–145)
SODIUM SERPL-SCNC: 129 MMOL/L (ref 136–145)
SODIUM SERPL-SCNC: 137 MMOL/L (ref 136–145)
SODIUM SERPL-SCNC: 137 MMOL/L (ref 136–145)
SOURCE (OHS): NORMAL
SP GR UR STRIP.AUTO: 1.03 (ref 1–1.03)
SPECIFIC GRAVITY, URINE AUTO (.000) (OHS): 1.03 (ref 1–1.03)
SPONT+MECH VT ON VENT: 500 ML
SQUAMOUS #/AREA URNS LPF: ABNORMAL /HPF
T PALLIDUM AB SER QL: NONREACTIVE
TDI LATERAL: 0.1 M/S
TDI SEPTAL: 0.07 M/S
TDI: 0.09 M/S
TR MAX PG: 23 MMHG
TRICUSPID ANNULAR PLANE SYSTOLIC EXCURSION: 2.28 CM
TRIGL SERPL-MCNC: 81 MG/DL (ref 34–140)
TRIGL SERPL-MCNC: 85 MG/DL (ref 34–140)
UROBILINOGEN UR STRIP-ACNC: NORMAL
VANCOMYCIN TROUGH SERPL-MCNC: 10 UG/ML (ref 15–20)
WBC # SPEC AUTO: 9.42 X10(3)/MCL (ref 4.5–11.5)
WBC # SPEC AUTO: 9.93 X10(3)/MCL (ref 4.5–11.5)
WBC #/AREA URNS AUTO: ABNORMAL /HPF

## 2024-03-19 PROCEDURE — 36600 WITHDRAWAL OF ARTERIAL BLOOD: CPT

## 2024-03-19 PROCEDURE — 02HV33Z INSERTION OF INFUSION DEVICE INTO SUPERIOR VENA CAVA, PERCUTANEOUS APPROACH: ICD-10-PCS | Performed by: HOSPITALIST

## 2024-03-19 PROCEDURE — 80074 ACUTE HEPATITIS PANEL: CPT

## 2024-03-19 PROCEDURE — 63600175 PHARM REV CODE 636 W HCPCS: Performed by: INTERNAL MEDICINE

## 2024-03-19 PROCEDURE — 87591 N.GONORRHOEAE DNA AMP PROB: CPT | Performed by: INTERNAL MEDICINE

## 2024-03-19 PROCEDURE — 99900035 HC TECH TIME PER 15 MIN (STAT)

## 2024-03-19 PROCEDURE — 27200966 HC CLOSED SUCTION SYSTEM

## 2024-03-19 PROCEDURE — 25000003 PHARM REV CODE 250: Performed by: INTERNAL MEDICINE

## 2024-03-19 PROCEDURE — 83605 ASSAY OF LACTIC ACID: CPT

## 2024-03-19 PROCEDURE — 63600175 PHARM REV CODE 636 W HCPCS

## 2024-03-19 PROCEDURE — 83735 ASSAY OF MAGNESIUM: CPT

## 2024-03-19 PROCEDURE — 87491 CHLMYD TRACH DNA AMP PROBE: CPT | Performed by: INTERNAL MEDICINE

## 2024-03-19 PROCEDURE — 83690 ASSAY OF LIPASE: CPT

## 2024-03-19 PROCEDURE — 80053 COMPREHEN METABOLIC PANEL: CPT

## 2024-03-19 PROCEDURE — 94760 N-INVAS EAR/PLS OXIMETRY 1: CPT | Mod: XB

## 2024-03-19 PROCEDURE — C1751 CATH, INF, PER/CENT/MIDLINE: HCPCS

## 2024-03-19 PROCEDURE — 20000000 HC ICU ROOM

## 2024-03-19 PROCEDURE — 27100171 HC OXYGEN HIGH FLOW UP TO 24 HOURS

## 2024-03-19 PROCEDURE — 82803 BLOOD GASES ANY COMBINATION: CPT

## 2024-03-19 PROCEDURE — 84100 ASSAY OF PHOSPHORUS: CPT

## 2024-03-19 PROCEDURE — 82150 ASSAY OF AMYLASE: CPT

## 2024-03-19 PROCEDURE — 93005 ELECTROCARDIOGRAM TRACING: CPT

## 2024-03-19 PROCEDURE — 83735 ASSAY OF MAGNESIUM: CPT | Performed by: INTERNAL MEDICINE

## 2024-03-19 PROCEDURE — 81001 URINALYSIS AUTO W/SCOPE: CPT

## 2024-03-19 PROCEDURE — 85007 BL SMEAR W/DIFF WBC COUNT: CPT

## 2024-03-19 PROCEDURE — 94003 VENT MGMT INPAT SUBQ DAY: CPT

## 2024-03-19 PROCEDURE — 25000003 PHARM REV CODE 250

## 2024-03-19 PROCEDURE — 85027 COMPLETE CBC AUTOMATED: CPT

## 2024-03-19 PROCEDURE — 27000207 HC ISOLATION

## 2024-03-19 PROCEDURE — 84100 ASSAY OF PHOSPHORUS: CPT | Performed by: INTERNAL MEDICINE

## 2024-03-19 PROCEDURE — 80053 COMPREHEN METABOLIC PANEL: CPT | Performed by: STUDENT IN AN ORGANIZED HEALTH CARE EDUCATION/TRAINING PROGRAM

## 2024-03-19 PROCEDURE — 25000003 PHARM REV CODE 250: Performed by: STUDENT IN AN ORGANIZED HEALTH CARE EDUCATION/TRAINING PROGRAM

## 2024-03-19 PROCEDURE — 82010 KETONE BODYS QUAN: CPT

## 2024-03-19 PROCEDURE — 86780 TREPONEMA PALLIDUM: CPT

## 2024-03-19 PROCEDURE — 25500020 PHARM REV CODE 255: Performed by: INTERNAL MEDICINE

## 2024-03-19 PROCEDURE — 87389 HIV-1 AG W/HIV-1&-2 AB AG IA: CPT

## 2024-03-19 PROCEDURE — 87798 DETECT AGENT NOS DNA AMP: CPT

## 2024-03-19 PROCEDURE — 84478 ASSAY OF TRIGLYCERIDES: CPT

## 2024-03-19 PROCEDURE — 80307 DRUG TEST PRSMV CHEM ANLYZR: CPT

## 2024-03-19 PROCEDURE — 87641 MR-STAPH DNA AMP PROBE: CPT

## 2024-03-19 PROCEDURE — 0241U COVID/RSV/FLU A&B PCR: CPT

## 2024-03-19 PROCEDURE — 80202 ASSAY OF VANCOMYCIN: CPT | Performed by: INTERNAL MEDICINE

## 2024-03-19 RX ORDER — DEXTROSE MONOHYDRATE 100 MG/ML
INJECTION, SOLUTION INTRAVENOUS
Status: DISCONTINUED | OUTPATIENT
Start: 2024-03-19 | End: 2024-04-12 | Stop reason: HOSPADM

## 2024-03-19 RX ORDER — INSULIN ASPART 100 [IU]/ML
0-10 INJECTION, SOLUTION INTRAVENOUS; SUBCUTANEOUS EVERY 4 HOURS
Status: CANCELLED | OUTPATIENT
Start: 2024-03-19

## 2024-03-19 RX ORDER — SODIUM CHLORIDE 0.9 % (FLUSH) 0.9 %
10 SYRINGE (ML) INJECTION
Status: DISCONTINUED | OUTPATIENT
Start: 2024-03-19 | End: 2024-04-12 | Stop reason: HOSPADM

## 2024-03-19 RX ORDER — LEVALBUTEROL INHALATION SOLUTION 0.63 MG/3ML
0.63 SOLUTION RESPIRATORY (INHALATION) EVERY 6 HOURS PRN
Status: DISCONTINUED | OUTPATIENT
Start: 2024-03-19 | End: 2024-03-20

## 2024-03-19 RX ORDER — MIDAZOLAM HYDROCHLORIDE 5 MG/ML
INJECTION INTRAMUSCULAR; INTRAVENOUS
Status: COMPLETED
Start: 2024-03-19 | End: 2024-03-19

## 2024-03-19 RX ORDER — FENTANYL CITRATE 50 UG/ML
INJECTION, SOLUTION INTRAMUSCULAR; INTRAVENOUS
Status: COMPLETED
Start: 2024-03-19 | End: 2024-03-19

## 2024-03-19 RX ORDER — GLUCAGON 1 MG
1 KIT INJECTION
Status: CANCELLED | OUTPATIENT
Start: 2024-03-19

## 2024-03-19 RX ORDER — DEXTROSE MONOHYDRATE AND SODIUM CHLORIDE 5; .45 G/100ML; G/100ML
INJECTION, SOLUTION INTRAVENOUS CONTINUOUS PRN
Status: DISCONTINUED | OUTPATIENT
Start: 2024-03-19 | End: 2024-03-20

## 2024-03-19 RX ORDER — DEXTROSE, SODIUM CHLORIDE, SODIUM LACTATE, POTASSIUM CHLORIDE, AND CALCIUM CHLORIDE 5; .6; .31; .03; .02 G/100ML; G/100ML; G/100ML; G/100ML; G/100ML
INJECTION, SOLUTION INTRAVENOUS CONTINUOUS
Status: DISCONTINUED | OUTPATIENT
Start: 2024-03-19 | End: 2024-03-19

## 2024-03-19 RX ORDER — MIDAZOLAM HYDROCHLORIDE 1 MG/ML
2 INJECTION INTRAMUSCULAR; INTRAVENOUS ONCE
Status: COMPLETED | OUTPATIENT
Start: 2024-03-19 | End: 2024-03-19

## 2024-03-19 RX ORDER — MIDAZOLAM HYDROCHLORIDE 1 MG/ML
0-5 INJECTION, SOLUTION INTRAVENOUS CONTINUOUS
Status: DISCONTINUED | OUTPATIENT
Start: 2024-03-19 | End: 2024-03-20

## 2024-03-19 RX ORDER — SODIUM CHLORIDE 9 MG/ML
1000 INJECTION, SOLUTION INTRAVENOUS CONTINUOUS
Status: DISCONTINUED | OUTPATIENT
Start: 2024-03-19 | End: 2024-03-19

## 2024-03-19 RX ORDER — DOBUTAMINE HYDROCHLORIDE 400 MG/100ML
0-10 INJECTION INTRAVENOUS CONTINUOUS
Status: DISCONTINUED | OUTPATIENT
Start: 2024-03-19 | End: 2024-03-20

## 2024-03-19 RX ORDER — FENTANYL CITRATE 50 UG/ML
50 INJECTION, SOLUTION INTRAMUSCULAR; INTRAVENOUS ONCE
Status: COMPLETED | OUTPATIENT
Start: 2024-03-19 | End: 2024-03-19

## 2024-03-19 RX ORDER — FENTANYL CITRATE 50 UG/ML
25 INJECTION, SOLUTION INTRAMUSCULAR; INTRAVENOUS ONCE
Status: COMPLETED | OUTPATIENT
Start: 2024-03-19 | End: 2024-03-19

## 2024-03-19 RX ORDER — MIDAZOLAM HYDROCHLORIDE 5 MG/ML
INJECTION INTRAMUSCULAR; INTRAVENOUS
Status: DISPENSED
Start: 2024-03-19 | End: 2024-03-19

## 2024-03-19 RX ORDER — INSULIN ASPART 100 [IU]/ML
0-10 INJECTION, SOLUTION INTRAVENOUS; SUBCUTANEOUS EVERY 6 HOURS PRN
Status: DISCONTINUED | OUTPATIENT
Start: 2024-03-19 | End: 2024-04-12 | Stop reason: HOSPADM

## 2024-03-19 RX ORDER — MIDAZOLAM HYDROCHLORIDE 1 MG/ML
2 INJECTION INTRAMUSCULAR; INTRAVENOUS ONCE
Status: DISCONTINUED | OUTPATIENT
Start: 2024-03-19 | End: 2024-03-21

## 2024-03-19 RX ORDER — IPRATROPIUM BROMIDE 0.5 MG/2.5ML
0.5 SOLUTION RESPIRATORY (INHALATION) EVERY 6 HOURS PRN
Status: DISCONTINUED | OUTPATIENT
Start: 2024-03-19 | End: 2024-04-12 | Stop reason: HOSPADM

## 2024-03-19 RX ORDER — CHLORHEXIDINE GLUCONATE ORAL RINSE 1.2 MG/ML
15 SOLUTION DENTAL 2 TIMES DAILY
Status: DISCONTINUED | OUTPATIENT
Start: 2024-03-19 | End: 2024-04-12 | Stop reason: HOSPADM

## 2024-03-19 RX ORDER — SODIUM CHLORIDE, SODIUM LACTATE, POTASSIUM CHLORIDE, CALCIUM CHLORIDE 600; 310; 30; 20 MG/100ML; MG/100ML; MG/100ML; MG/100ML
INJECTION, SOLUTION INTRAVENOUS CONTINUOUS
Status: ACTIVE | OUTPATIENT
Start: 2024-03-19 | End: 2024-03-19

## 2024-03-19 RX ORDER — POTASSIUM CHLORIDE 14.9 MG/ML
20 INJECTION INTRAVENOUS
Status: DISPENSED | OUTPATIENT
Start: 2024-03-19 | End: 2024-03-19

## 2024-03-19 RX ORDER — PROPOFOL 10 MG/ML
0-50 INJECTION, EMULSION INTRAVENOUS CONTINUOUS
Status: DISCONTINUED | OUTPATIENT
Start: 2024-03-19 | End: 2024-03-20

## 2024-03-19 RX ADMIN — PIPERACILLIN AND TAZOBACTAM 4.5 G: 4; .5 INJECTION, POWDER, LYOPHILIZED, FOR SOLUTION INTRAVENOUS; PARENTERAL at 06:03

## 2024-03-19 RX ADMIN — MIDAZOLAM IN SODIUM CHLORIDE 1 MG/HR: 1 INJECTION INTRAVENOUS at 08:03

## 2024-03-19 RX ADMIN — AMIODARONE HYDROCHLORIDE 0.5 MG/MIN: 1.8 INJECTION, SOLUTION INTRAVENOUS at 03:03

## 2024-03-19 RX ADMIN — POTASSIUM PHOSPHATE, MONOBASIC AND POTASSIUM PHOSPHATE, DIBASIC 15 MMOL: 224; 236 INJECTION, SOLUTION, CONCENTRATE INTRAVENOUS at 03:03

## 2024-03-19 RX ADMIN — ATORVASTATIN CALCIUM 80 MG: 40 TABLET, FILM COATED ORAL at 12:03

## 2024-03-19 RX ADMIN — SODIUM CHLORIDE, POTASSIUM CHLORIDE, SODIUM LACTATE AND CALCIUM CHLORIDE: 600; 310; 30; 20 INJECTION, SOLUTION INTRAVENOUS at 12:03

## 2024-03-19 RX ADMIN — DOBUTAMINE HYDROCHLORIDE 5 MCG/KG/MIN: 400 INJECTION INTRAVENOUS at 07:03

## 2024-03-19 RX ADMIN — POTASSIUM PHOSPHATE, MONOBASIC AND POTASSIUM PHOSPHATE, DIBASIC 30 MMOL: 224; 236 INJECTION, SOLUTION, CONCENTRATE INTRAVENOUS at 07:03

## 2024-03-19 RX ADMIN — VANCOMYCIN HYDROCHLORIDE 1250 MG: 1.25 INJECTION, POWDER, LYOPHILIZED, FOR SOLUTION INTRAVENOUS at 11:03

## 2024-03-19 RX ADMIN — ATORVASTATIN CALCIUM 80 MG: 40 TABLET, FILM COATED ORAL at 10:03

## 2024-03-19 RX ADMIN — SODIUM CHLORIDE, POTASSIUM CHLORIDE, SODIUM LACTATE AND CALCIUM CHLORIDE 500 ML: 600; 310; 30; 20 INJECTION, SOLUTION INTRAVENOUS at 05:03

## 2024-03-19 RX ADMIN — FENOFIBRATE 145 MG: 145 TABLET, FILM COATED ORAL at 12:03

## 2024-03-19 RX ADMIN — THIAMINE HYDROCHLORIDE 500 MG: 100 INJECTION, SOLUTION INTRAMUSCULAR; INTRAVENOUS at 08:03

## 2024-03-19 RX ADMIN — CALCIUM GLUCONATE 1 G: 20 INJECTION, SOLUTION INTRAVENOUS at 12:03

## 2024-03-19 RX ADMIN — THIAMINE HYDROCHLORIDE 500 MG: 100 INJECTION, SOLUTION INTRAMUSCULAR; INTRAVENOUS at 02:03

## 2024-03-19 RX ADMIN — CHLORHEXIDINE GLUCONATE 0.12% ORAL RINSE 15 ML: 1.2 LIQUID ORAL at 10:03

## 2024-03-19 RX ADMIN — SODIUM CHLORIDE 125 MG: 9 INJECTION, SOLUTION INTRAVENOUS at 01:03

## 2024-03-19 RX ADMIN — PIPERACILLIN AND TAZOBACTAM 4.5 G: 4; .5 INJECTION, POWDER, LYOPHILIZED, FOR SOLUTION INTRAVENOUS; PARENTERAL at 11:03

## 2024-03-19 RX ADMIN — MIDAZOLAM 2 MG: 1 INJECTION INTRAMUSCULAR; INTRAVENOUS at 01:03

## 2024-03-19 RX ADMIN — MIDAZOLAM 2 MG: 5 INJECTION INTRAMUSCULAR; INTRAVENOUS at 06:03

## 2024-03-19 RX ADMIN — IOHEXOL 94 ML: 350 INJECTION, SOLUTION INTRAVENOUS at 06:03

## 2024-03-19 RX ADMIN — DEXMEDETOMIDINE HYDROCHLORIDE 0.6 MCG/KG/HR: 400 INJECTION INTRAVENOUS at 04:03

## 2024-03-19 RX ADMIN — PIPERACILLIN AND TAZOBACTAM 4.5 G: 4; .5 INJECTION, POWDER, LYOPHILIZED, FOR SOLUTION INTRAVENOUS; PARENTERAL at 02:03

## 2024-03-19 RX ADMIN — CALCIUM GLUCONATE 1 G: 20 INJECTION, SOLUTION INTRAVENOUS at 02:03

## 2024-03-19 RX ADMIN — CHLORHEXIDINE GLUCONATE 0.12% ORAL RINSE 15 ML: 1.2 LIQUID ORAL at 09:03

## 2024-03-19 RX ADMIN — PIPERACILLIN AND TAZOBACTAM 4.5 G: 4; .5 INJECTION, POWDER, LYOPHILIZED, FOR SOLUTION INTRAVENOUS; PARENTERAL at 12:03

## 2024-03-19 RX ADMIN — FENTANYL CITRATE 50 MCG: 50 INJECTION, SOLUTION INTRAMUSCULAR; INTRAVENOUS at 11:03

## 2024-03-19 RX ADMIN — DEXMEDETOMIDINE HYDROCHLORIDE 0.6 MCG/KG/HR: 400 INJECTION INTRAVENOUS at 01:03

## 2024-03-19 RX ADMIN — FENTANYL CITRATE 25 MCG: 50 INJECTION, SOLUTION INTRAMUSCULAR; INTRAVENOUS at 01:03

## 2024-03-19 RX ADMIN — FOLIC ACID 1 MG: 5 INJECTION, SOLUTION INTRAMUSCULAR; INTRAVENOUS; SUBCUTANEOUS at 08:03

## 2024-03-19 RX ADMIN — POTASSIUM CHLORIDE 20 MEQ: 14.9 INJECTION, SOLUTION INTRAVENOUS at 03:03

## 2024-03-19 RX ADMIN — SODIUM CHLORIDE, POTASSIUM CHLORIDE, SODIUM LACTATE AND CALCIUM CHLORIDE 1000 ML: 600; 310; 30; 20 INJECTION, SOLUTION INTRAVENOUS at 07:03

## 2024-03-19 RX ADMIN — THIAMINE HYDROCHLORIDE 500 MG: 100 INJECTION, SOLUTION INTRAMUSCULAR; INTRAVENOUS at 09:03

## 2024-03-19 RX ADMIN — Medication 100 MCG/HR: at 01:03

## 2024-03-19 RX ADMIN — CALCIUM GLUCONATE 1 G: 20 INJECTION, SOLUTION INTRAVENOUS at 01:03

## 2024-03-19 RX ADMIN — DEXMEDETOMIDINE HYDROCHLORIDE 1 MCG/KG/HR: 400 INJECTION INTRAVENOUS at 03:03

## 2024-03-19 RX ADMIN — DEXMEDETOMIDINE HYDROCHLORIDE 0.8 MCG/KG/HR: 400 INJECTION INTRAVENOUS at 03:03

## 2024-03-19 RX ADMIN — VANCOMYCIN HYDROCHLORIDE 1000 MG: 1 INJECTION, POWDER, LYOPHILIZED, FOR SOLUTION INTRAVENOUS at 10:03

## 2024-03-19 RX ADMIN — POTASSIUM CHLORIDE 20 MEQ: 14.9 INJECTION, SOLUTION INTRAVENOUS at 06:03

## 2024-03-19 RX ADMIN — SODIUM CHLORIDE, POTASSIUM CHLORIDE, SODIUM LACTATE AND CALCIUM CHLORIDE: 600; 310; 30; 20 INJECTION, SOLUTION INTRAVENOUS at 01:03

## 2024-03-19 NOTE — PROGRESS NOTES
UROLOGY  PROGRESS  NOTE    Ran Reyes Jr. 1955  [id number]  3/19/2024    POD 1 s/p left hemiscrotal exploration, left orchiectomy, washout/debridement of wound    Patient remains intubated  On dobutamine, amio gtt  Does arouse and respond appropriately when stimulated per nursing    afebrile   950 mL urine output overnight  WBC 9.9   H&H 8.7/27.5   BUN and creatinine 15.3/0.83    Cultures pending - on vanc and Zosyn    Intake/Output:  No intake/output data recorded.  I/O last 3 completed shifts:  In: 4112.9 [I.V.:1460.9; IV Piggyback:2652.1]  Out: 1550 [Urine:1400; Blood:150]     Exam:    sedated  Resp: intubated on mechanical ventilation  Abd: soft, ND  : clear yellow urine draining to  bag; scrotal wound unpacked, tissue bed pink with some sloughing and some necrosis along the medial and inferior edges; minimal serosanguineous drainage; packed with sterile saline soaked Kerlix and dressed with ABD pad/supportive briefs  Extremity: no C/C/E    Recent Results (from the past 24 hour(s))   Wound Culture    Collection Time: 03/18/24  3:02 PM    Specimen: Scrotum; Abscess   Result Value Ref Range    Wound Culture No Growth At 24 Hours    Phosphorus    Collection Time: 03/18/24 10:26 PM   Result Value Ref Range    Phosphorus Level 3.2 2.3 - 4.7 mg/dL   Magnesium    Collection Time: 03/18/24 10:26 PM   Result Value Ref Range    Magnesium Level 2.20 1.60 - 2.60 mg/dL   Comprehensive Metabolic Panel    Collection Time: 03/18/24 10:26 PM   Result Value Ref Range    Sodium Level 133 (L) 136 - 145 mmol/L    Potassium Level 3.3 (L) 3.5 - 5.1 mmol/L    Chloride 99 98 - 107 mmol/L    Carbon Dioxide 18 (L) 23 - 31 mmol/L    Glucose Level 159 (H) 82 - 115 mg/dL    Blood Urea Nitrogen 16.2 8.4 - 25.7 mg/dL    Creatinine 0.94 0.73 - 1.18 mg/dL    Calcium Level Total 7.4 (L) 8.8 - 10.0 mg/dL    Protein Total 6.7 5.8 - 7.6 gm/dL    Albumin Level 2.3 (L) 3.4 - 4.8 g/dL    Globulin 4.4 (H) 2.4 - 3.5 gm/dL    Albumin/Globulin  Ratio 0.5 (L) 1.1 - 2.0 ratio    Bilirubin Total 0.8 <=1.5 mg/dL    Alkaline Phosphatase 67 40 - 150 unit/L    Alanine Aminotransferase 16 0 - 55 unit/L    Aspartate Aminotransferase 64 (H) 5 - 34 unit/L    eGFR >60 mls/min/1.73/m2   CBC with Differential    Collection Time: 03/18/24 10:26 PM   Result Value Ref Range    WBC 11.77 (H) 4.50 - 11.50 x10(3)/mcL    RBC 2.56 (L) 4.70 - 6.10 x10(6)/mcL    Hgb 8.2 (L) 14.0 - 18.0 g/dL    Hct 25.1 (L) 42.0 - 52.0 %    MCV 98.0 (H) 80.0 - 94.0 fL    MCH 32.0 (H) 27.0 - 31.0 pg    MCHC 32.7 (L) 33.0 - 36.0 g/dL    RDW 16.4 11.5 - 17.0 %    Platelet 117 (L) 130 - 400 x10(3)/mcL    MPV 13.2 (H) 7.4 - 10.4 fL    Neut % 84.9 %    Lymph % 5.2 %    Mono % 8.2 %    Eos % 0.0 %    Basophil % 0.6 %    Lymph # 0.61 0.6 - 4.6 x10(3)/mcL    Neut # 10.00 (H) 2.1 - 9.2 x10(3)/mcL    Mono # 0.96 0.1 - 1.3 x10(3)/mcL    Eos # 0.00 0 - 0.9 x10(3)/mcL    Baso # 0.07 <=0.2 x10(3)/mcL    IG# 0.13 (H) 0 - 0.04 x10(3)/mcL    IG% 1.1 %    NRBC% 0.0 %    IPF 13.9 (H) 0.9 - 11.2 %   Lactic Acid, Plasma    Collection Time: 03/18/24 10:26 PM   Result Value Ref Range    Lactic Acid Level 3.7 (HH) 0.5 - 2.2 mmol/L   Ferritin    Collection Time: 03/18/24 10:26 PM   Result Value Ref Range    Ferritin Level 1,101.88 (H) 21.81 - 274.66 ng/mL   Vitamin B12    Collection Time: 03/18/24 10:26 PM   Result Value Ref Range    Vitamin B12 Level 1,078 (H) 213 - 816 pg/mL   Folate    Collection Time: 03/18/24 10:26 PM   Result Value Ref Range    Folate Level 10.0 7.0 - 31.4 ng/mL   Iron and TIBC    Collection Time: 03/18/24 10:26 PM   Result Value Ref Range    Iron Binding Capacity Unsaturated 124 69 - 240 ug/dL    Iron Level 27 (L) 65 - 175 ug/dL    Transferrin 96 (L) 163 - 344 mg/dL    Iron Binding Capacity Total 151 (L) 250 - 450 ug/dL    Iron Saturation 18 (L) 20 - 50 %   Reticulocytes    Collection Time: 03/18/24 10:26 PM   Result Value Ref Range    Retic Cnt Auto 1.14 1.1 - 2.1 %    RET# 0.0292 0.026 - 0.095  x10e6/uL   Lipid Panel    Collection Time: 03/18/24 10:26 PM   Result Value Ref Range    Cholesterol Total 46 <=200 mg/dL    HDL Cholesterol <5 (L) 35 - 60 mg/dL    Triglyceride 1,569 (H) 34 - 140 mg/dL    Cholesterol/HDL Ratio     Lactate Dehydrogenase    Collection Time: 03/18/24 10:26 PM   Result Value Ref Range    Lactate Dehydrogenase 269 (H) 125 - 220 U/L   Haptoglobin    Collection Time: 03/18/24 10:26 PM   Result Value Ref Range    Haptoglobin 347 40 - 368 mg/dL   T4, Free    Collection Time: 03/18/24 10:26 PM   Result Value Ref Range    Thyroxine Free 1.30 0.70 - 1.48 ng/dL   TSH    Collection Time: 03/18/24 10:26 PM   Result Value Ref Range    TSH 1.097 0.350 - 4.940 uIU/mL   Hemoglobin A1C    Collection Time: 03/18/24 10:26 PM   Result Value Ref Range    Hemoglobin A1c 5.0 <=7.0 %    Estimated Average Glucose 96.8 mg/dL   PTH, Intact    Collection Time: 03/18/24 10:26 PM   Result Value Ref Range    Parathyroid Hormone Intact 60.0 8.7 - 77.0 pg/mL   Troponin I    Collection Time: 03/18/24 10:26 PM   Result Value Ref Range    Troponin-I 0.058 (H) 0.000 - 0.045 ng/mL   CK    Collection Time: 03/18/24 10:26 PM   Result Value Ref Range    Creatine Kinase 95 30 - 200 U/L   CK-MB    Collection Time: 03/18/24 10:26 PM   Result Value Ref Range    Creatine Kinase MB 2.0 <=7.2 ng/mL   RT Blood Gas    Collection Time: 03/18/24 10:42 PM   Result Value Ref Range    Sample Type Arterial Blood     Sample site Right Radial Artery     Drawn by TDL RRT     pH, Blood gas 7.350 7.350 - 7.450    pCO2, Blood gas 48.0 (H) 35.0 - 45.0 mmHg    pO2, Blood gas 114.0 (H) 80.0 - 100.0 mmHg    Sodium, Blood Gas 134 (L) 137 - 145 mmol/L    Potassium, Blood Gas 2.9 (L) 3.5 - 5.0 mmol/L    Calcium Level Ionized 1.05 (L) 1.12 - 1.23 mmol/L    TOC2, Blood gas 28.0 mmol/L    Base Excess, Blood gas 0.60 -2.00 - 2.00 mmol/L    sO2, Blood gas 98.2 %    HCO3, Blood gas 26.5 (H) 22.0 - 26.0 mmol/L    THb, Blood gas 9.0 (L) 12 - 16 g/dL    O2 Hb,  Blood Gas 97.3 (H) 94.0 - 97.0 %    CO Hgb 2.4 (H) 0.5 - 1.5 %    Met Hgb 0.3 (L) 0.4 - 1.5 %    Allens Test Yes     MODE SIMV     FIO2, Blood gas 50 %    Mech Vt 500 ml    Mech RR 18 b/min    PEEP 5.0 cmH2O    PS 10.0 cmH2O   SYPHILIS ANTIBODY (WITH REFLEX RPR)    Collection Time: 03/19/24 12:20 AM   Result Value Ref Range    Syphilis Antibody Nonreactive Nonreactive, Equivocal   Comprehensive metabolic panel    Collection Time: 03/19/24 12:20 AM   Result Value Ref Range    Sodium Level 129 (L) 136 - 145 mmol/L    Potassium Level 3.0 (L) 3.5 - 5.1 mmol/L    Chloride 95 (L) 98 - 107 mmol/L    Carbon Dioxide 18 (L) 23 - 31 mmol/L    Glucose Level 515 (HH) 82 - 115 mg/dL    Blood Urea Nitrogen 18.9 8.4 - 25.7 mg/dL    Creatinine 1.16 0.73 - 1.18 mg/dL    Calcium Level Total 7.7 (L) 8.8 - 10.0 mg/dL    Protein Total 5.7 (L) 5.8 - 7.6 gm/dL    Albumin Level 2.3 (L) 3.4 - 4.8 g/dL    Globulin 3.4 2.4 - 3.5 gm/dL    Albumin/Globulin Ratio 0.7 (L) 1.1 - 2.0 ratio    Bilirubin Total 0.8 <=1.5 mg/dL    Alkaline Phosphatase 75 40 - 150 unit/L    Alanine Aminotransferase 15 0 - 55 unit/L    Aspartate Aminotransferase 32 5 - 34 unit/L    eGFR >60 mls/min/1.73/m2   Phosphorus    Collection Time: 03/19/24 12:20 AM   Result Value Ref Range    Phosphorus Level 3.2 2.3 - 4.7 mg/dL   Magnesium    Collection Time: 03/19/24 12:20 AM   Result Value Ref Range    Magnesium Level 2.30 1.60 - 2.60 mg/dL   Lipase    Collection Time: 03/19/24 12:20 AM   Result Value Ref Range    Lipase Level 5 <=60 U/L   Amylase    Collection Time: 03/19/24 12:20 AM   Result Value Ref Range    Amylase Level 35 25 - 125 unit/L   Beta-Hydroxybutyrate, Serum    Collection Time: 03/19/24 12:20 AM   Result Value Ref Range    Beta Hydroxybutyrate 1.00 (H) <=0.30 mmol/L   CBC with Differential    Collection Time: 03/19/24 12:21 AM   Result Value Ref Range    WBC 9.42 4.50 - 11.50 x10(3)/mcL    RBC 2.82 (L) 4.70 - 6.10 x10(6)/mcL    Hgb 8.9 (L) 14.0 - 18.0 g/dL     Hct 26.9 (L) 42.0 - 52.0 %    MCV 95.4 (H) 80.0 - 94.0 fL    MCH 31.6 (H) 27.0 - 31.0 pg    MCHC 33.1 33.0 - 36.0 g/dL    RDW 16.1 11.5 - 17.0 %    Platelet 134 130 - 400 x10(3)/mcL    MPV 11.9 (H) 7.4 - 10.4 fL    NRBC% 0.0 %    IPF 10.9 0.9 - 11.2 %   Manual Differential    Collection Time: 03/19/24 12:21 AM   Result Value Ref Range    WBC 9.42 x10(3)/mcL    Neutrophils % 92 %    Lymphs % 4 %    Monocytes % 4 %    Neutrophils Abs 8.6664 2.1 - 9.2 x10(3)/mcL    Lymphs Abs 0.3768 (L) 0.6 - 4.6 x10(3)/mcL    Monocytes Abs 0.3768 0.1 - 1.3 x10(3)/mcL    Platelets Normal Normal, Adequate    RBC Morph Abnormal (A) Normal    Anisocytosis 1+ (A) (none)    Macrocytosis 1+ (A) (none)   Echo Saline Bubble? Yes    Collection Time: 03/19/24 12:29 AM   Result Value Ref Range    Quintana's Biplane MOD Ejection Fraction 39 %    LVOT stroke volume 46.73 cm3    LVIDd 5.61 3.5 - 6.0 cm    LV Systolic Volume 98.80 mL    LVIDs 4.63 (A) 2.1 - 4.0 cm    LV Diastolic Volume 154.00 mL    IVS 1.16 (A) 0.6 - 1.1 cm    LVOT diameter 2.20 cm    LVOT area 3.8 cm2    FS 17 (A) 28 - 44 %    Left Ventricle Relative Wall Thickness 0.42 cm    Posterior Wall 1.19 (A) 0.6 - 1.1 cm    LV mass 273.33 g    MV Peak E Stuart 1.18 m/s    TDI LATERAL 0.10 m/s    TDI SEPTAL 0.07 m/s    E/E' ratio 13.88 m/s    MV Peak A Stuart 0.25 m/s    TR Max Stuart 2.38 m/s    E/A ratio 4.72     E wave deceleration time 209.00 msec    LV SEPTAL E/E' RATIO 16.86 m/s    LV LATERAL E/E' RATIO 11.80 m/s    LVOT peak stuart 0.74 m/s    Left Ventricular Outflow Tract Mean Velocity 0.45 cm/s    Left Ventricular Outflow Tract Mean Gradient 1.00 mmHg    RV mid diameter 3.60 cm    RV S' 11.00 cm/s    TAPSE 2.28 cm    LA size 4.90 cm    LA volume (mod) 117.00 cm3    RA Major Axis 6.54 cm    RA Width 6.18 cm    AV mean gradient 3 mmHg    AV peak gradient 7 mmHg    Ao peak stuart 1.28 m/s    Ao VTI 21.70 cm    LVOT peak VTI 12.30 cm    AV valve area 2.15 cm²    AV Velocity Ratio 0.58     AV index  (prosthetic) 0.57     HODAN by Velocity Ratio 2.20 cm²    Mr max trevon 4.63 m/s    MV mean gradient 3 mmHg    MV peak gradient 5 mmHg    MV stenosis pressure 1/2 time 45.00 ms    MV valve area p 1/2 method 4.89 cm2    MV valve area by continuity eq 2.45 cm2    MV VTI 19.1 cm    Triscuspid Valve Regurgitation Peak Gradient 23 mmHg    PV PEAK VELOCITY 1.25 m/s    PV peak gradient 6 mmHg    Mean e' 0.09 m/s   Drug Screen, Urine    Collection Time: 03/19/24  1:28 AM   Result Value Ref Range    Amphetamines, Urine Negative Negative    Barbituates, Urine Negative Negative    Benzodiazepine, Urine Negative Negative    Cannabinoids, Urine Negative Negative    Cocaine, Urine Negative Negative    Fentanyl, Urine Positive (A) Negative    MDMA, Urine Negative Negative    Opiates, Urine Positive (A) Negative    Phencyclidine, Urine Negative Negative    pH, Urine 6.0 3.0 - 11.0    Specific Gravity, Urine Auto 1.027 1.001 - 1.035   Urinalysis, Reflex to Urine Culture    Collection Time: 03/19/24  1:28 AM    Specimen: Urine   Result Value Ref Range    Color, UA Yellow Yellow, Light-Yellow, Colorless, Straw, Dark-Yellow    Appearance, UA Clear Clear    Specific Gravity, UA 1.027 1.005 - 1.030    pH, UA 6.0 5.0 - 8.5    Protein, UA 1+ (A) Negative    Glucose, UA 3+ (A) Negative, Normal    Ketones, UA 1+ (A) Negative    Blood, UA 1+ (A) Negative    Bilirubin, UA Negative Negative    Urobilinogen, UA Normal 0.2, 1.0, Normal    Nitrites, UA Negative Negative    Leukocyte Esterase,  (A) Negative    WBC, UA 51-99 (A) None Seen, 0-2, 3-5, 0-5 /HPF    Bacteria, UA Trace None Seen, Trace /HPF    Squamous Epithelial Cells, UA Trace None Seen /HPF    Mucous, UA Trace (A) None Seen /LPF    RBC, UA 0-5 None Seen, 0-2, 3-5, 0-5 /HPF   Chlamydia/GC, PCR    Collection Time: 03/19/24  1:28 AM    Specimen: Urine   Result Value Ref Range    Chlamydia trachomatis PCR Not Detected Not Detected    N. gonorrhea PCR Not Detected Not Detected    Source  Urine    POCT glucose    Collection Time: 03/19/24  2:04 AM   Result Value Ref Range    POCT Glucose 239 (H) 70 - 110 mg/dL   MRSA PCR    Collection Time: 03/19/24  2:13 AM   Result Value Ref Range    MRSA PCR SCRN ([facility]) Not Detected Not Detected   Respiratory Panel    Collection Time: 03/19/24  2:13 AM   Result Value Ref Range    Adenovirus Not Detected Not Detected    Coronavirus 229E Not Detected Not Detected    Coronavirus HKU1 Not Detected Not Detected    Coronavirus NL63 Not Detected Not Detected    Coronavirus OC43 PCR, Common Cold Virus Not Detected Not Detected    Human Metapneumovirus Detected (A) Not Detected    Parainfluenza Virus 1 Not Detected Not Detected    Parainfluenza Virus 2 Not Detected Not Detected    Parainfluenza Virus 3 Not Detected Not Detected    Parainfluenza Virus 4 Not Detected Not Detected    Bordetella pertussis (ptxP) Not Detected Not Detected    Chlamydia pneumoniae Not Detected Not Detected    Mycoplasma pneumoniae Not Detected Not Detected    Human Rhinovirus/Enterovirus Not Detected Not Detected    Bordetella parapertussis (KT2526) Not Detected Not Detected   COVID/RSV/FLU A&B PCR    Collection Time: 03/19/24  2:13 AM   Result Value Ref Range    Influenza A PCR Not Detected Not Detected    Influenza B PCR Not Detected Not Detected    Respiratory Syncytial Virus PCR Not Detected Not Detected    SARS-CoV-2 PCR Not Detected Not Detected, Negative   Comprehensive metabolic panel    Collection Time: 03/19/24  5:37 AM   Result Value Ref Range    Sodium Level 137 136 - 145 mmol/L    Potassium Level 4.2 3.5 - 5.1 mmol/L    Chloride 101 98 - 107 mmol/L    Carbon Dioxide 25 23 - 31 mmol/L    Glucose Level 217 (H) 82 - 115 mg/dL    Blood Urea Nitrogen 15.3 8.4 - 25.7 mg/dL    Creatinine 0.83 0.73 - 1.18 mg/dL    Calcium Level Total 8.1 (L) 8.8 - 10.0 mg/dL    Protein Total 5.2 (L) 5.8 - 7.6 gm/dL    Albumin Level 2.3 (L) 3.4 - 4.8 g/dL    Globulin 2.9 2.4 - 3.5 gm/dL    Albumin/Globulin  Ratio 0.8 (L) 1.1 - 2.0 ratio    Bilirubin Total 0.5 <=1.5 mg/dL    Alkaline Phosphatase 61 40 - 150 unit/L    Alanine Aminotransferase 16 0 - 55 unit/L    Aspartate Aminotransferase 28 5 - 34 unit/L    eGFR >60 mls/min/1.73/m2   Magnesium    Collection Time: 03/19/24  5:37 AM   Result Value Ref Range    Magnesium Level 2.40 1.60 - 2.60 mg/dL   Lactic Acid, Plasma    Collection Time: 03/19/24  5:37 AM   Result Value Ref Range    Lactic Acid Level 1.3 0.5 - 2.2 mmol/L   Phosphorus    Collection Time: 03/19/24  5:37 AM   Result Value Ref Range    Phosphorus Level 3.3 2.3 - 4.7 mg/dL   CBC with Differential    Collection Time: 03/19/24  5:37 AM   Result Value Ref Range    WBC 9.93 4.50 - 11.50 x10(3)/mcL    RBC 2.88 (L) 4.70 - 6.10 x10(6)/mcL    Hgb 8.7 (L) 14.0 - 18.0 g/dL    Hct 27.5 (L) 42.0 - 52.0 %    MCV 95.5 (H) 80.0 - 94.0 fL    MCH 30.2 27.0 - 31.0 pg    MCHC 31.6 (L) 33.0 - 36.0 g/dL    RDW 16.1 11.5 - 17.0 %    Platelet 151 130 - 400 x10(3)/mcL    MPV 11.9 (H) 7.4 - 10.4 fL    NRBC% 0.0 %   Triglycerides    Collection Time: 03/19/24  5:37 AM   Result Value Ref Range    Triglyceride 81 34 - 140 mg/dL   Manual Differential    Collection Time: 03/19/24  5:37 AM   Result Value Ref Range    WBC 9.93 x10(3)/mcL    Neutrophils % 88 %    Lymphs % 5 %    Monocytes % 6 %    Metamyelocytes % 1 (H) <=0 %    Neutrophils Abs 8.7384 2.1 - 9.2 x10(3)/mcL    Lymphs Abs 0.4965 (L) 0.6 - 4.6 x10(3)/mcL    Monocytes Abs 0.5958 0.1 - 1.3 x10(3)/mcL    Platelets Normal Normal, Adequate    RBC Morph Abnormal (A) Normal    Poikilocytosis 1+ (A) (none)    Anisocytosis 1+ (A) (none)    Macrocytosis 1+ (A) (none)    Giant Platelets 1+    RT Blood Gas    Collection Time: 03/19/24  5:40 AM   Result Value Ref Range    Sample Type Arterial Blood     Sample site Left Radial Artery     Drawn by sd rrt     pH, Blood gas 7.440 7.350 - 7.450    pCO2, Blood gas 45.0 35.0 - 45.0 mmHg    pO2, Blood gas 96.0 80.0 - 100.0 mmHg    Sodium, Blood Gas  134 (L) 137 - 145 mmol/L    Potassium, Blood Gas 3.7 3.5 - 5.0 mmol/L    Calcium Level Ionized 1.11 (L) 1.12 - 1.23 mmol/L    TOC2, Blood gas 32.0 mmol/L    Base Excess, Blood gas 5.60 mmol/L    sO2, Blood gas 98.0 %    HCO3, Blood gas 30.6 (H) 22.0 - 26.0 mmol/L    Allens Test Yes     MODE SIMV     Oxygen Device, Blood gas Ventilator     FIO2, Blood gas 30 %    Mech Vt 500 ml    Mech RR 18 b/min    PEEP 5.0 cmH2O    PS 10.0 cmH2O       Assessment:  -scrotal abscess s/p left hemiscrotal exploration, left orchiectomy, washout/debridement of wound  -sepsis - on zosyn and vancomycin, BC and UC pending  -NSTEMI  -AFib RVR, chronic AFib on Xarelto Xarelto currently on hold - [facility] consulted  -COPD, nonischemic cardiomyopathy    Plan:  -Continue antibiotics, tailor according to final C&S results  -Recs per cardiology/ICU  -Wound care consulted for evaluation to begin daily wound care  -Dr. Rivas discussed with the patient's sister, Nina, over the phone.   -Following.     Qiana Valenzuela NP

## 2024-03-19 NOTE — PLAN OF CARE
03/19/24 1419   Discharge Assessment   Assessment Type Discharge Planning Assessment   Confirmed/corrected address, phone number and insurance Yes   Confirmed Demographics Correct on Facesheet   Source of Information family  (spoke to sister, Nina Olsen over the phone)   Communicated ZEINAB with patient/caregiver Date not available/Unable to determine   Reason For Admission Scrotal hematoma   People in Home alone   Facility Arrived From: home   Do you expect to return to your current living situation? Yes  (patient will need assistance)   Do you have help at home or someone to help you manage your care at home? No  (patient lives alone and sisterYeesnia assists intermittently)   Prior to hospitilization cognitive status: Alert/Oriented   Current cognitive status: Coma/Sedated/Intubated   Walking or Climbing Stairs Difficulty no   Dressing/Bathing Difficulty no   Equipment Currently Used at Home none   Patient currently being followed by outpatient case management? No   Do you currently have service(s) that help you manage your care at home? No   Do you take prescription medications? Yes   Do you have prescription coverage? Yes   Coverage medicaid, people's health   Who is going to help you get home at discharge? sister or niece   How do you get to doctors appointments? car, drives self  (his vehicle is not in working condition and was walking and getting assistance with rides)   Are you on dialysis? No   Do you take coumadin? No   Discharge Plan A Other  (to be determined)   Discharge Plan B Other  (to be determined)   DME Needed Upon Discharge  none   Discharge Plan discussed with: Sibling  (sister, Nina Olsen)   Transition of Care Barriers None     Spoke to sisterNina who lives in [place]. His other sister, Yesenia lives in [place] and checks on him occasionally but he walks and gets rides for transportation. Will need assistance upon discharge.

## 2024-03-19 NOTE — OP NOTE
[facility]                       [address] [id number]    PATIENT NAME:      HALIE FLORES   YOB: 1955  CSN:               [id number]  MRN:               [id number]  ADMIT DATE:        03/17/2024 20:41:00  PHYSICIAN:         Fahad Rivas MD                          OPERATIVE REPORT      DATE OF SURGERY:    3/17/2024    SURGEON:  Fahad Rivas MD    PREOPERATIVE DIAGNOSIS:  Left scrotal abscess.    POSTOPERATIVE DIAGNOSIS:  Left scrotal abscess.    PROCEDURE:  Left hemiscrotal exploration; left orchiectomy; debridement of   wound; washout with Pulsavac; wound packing.    PREOPERATIVE SITUATION:  This is a seriously ill 68-year-old gentleman.  He has   a left hemiscrotal abscess with a markedly swollen left hemiscrotum with   induration, erythema/early Claudine gangrene.  The patient is a nondiabetic.    Risks and benefits of surgical exploration were clearly outlined.  We will   proceed emergently.    OPERATION IN DETAIL:  After preop consent, the patient was taken to the   procedure room, placed in the supine position.  He was prepped and draped   sterilely.  The patient had a massively edematous left scrotum with oozing at   the base of the scrotum, foul smell noted upon examination.  Once asleep, a   large incision was made over the abscessed area in the left hemiscrotum.  There   was a fair amount of pus, which was encountered initially.  Cultures were   obtained (aerobic and anaerobic).  Lots of the hemiscrotum had auto dissected   from the abscess.  Once the abscess was removed, the left testicle and its   tunical layers were identified.  Testicle was edematous, swollen.  Using   combination of sharp dissection with Bovie endo-cautery, I dissected proximally.    Once this was done to satisfaction, meticulous dissection was carried forth to   skeletonize the cord structures as best as  possible.  Due to edema, this was   not possible in its entirety.  Right angle clamps were used in succession with   multiple suture ligation ties to ligate the cord and incise it.  This was done   in succession from right to left.  No obvious cord structures were easily   identified due to the amount of edema.  However, once the cord was skeletonized   as best as possible, I was able to do this safely with multiple suture ligations   in order to remove the entire cord and testicular structures.  Tunical layers   were opened around the left testicle, which appeared necrotic.  After the entire   cord structures were mobilized and incised, the testicle was passed off the   field.  The left hemiscrotum was copiously irrigated.  There was infection in   the scrotal wall, which was removed.  Hemostasis was obtained as this portion of   procedure was performed.  Once all necrotic tissue was removed, I then   irrigated the area with Pulsavac (2 L).  Dakin solution and Kerlix was then   placed into the wound, which had been partially closed superiorly to assist with   packing.  Once this was done, hemostasis was perfect.  There was a fairly good   cosmetic result.  The patient was transferred from the procedure room to   recovery in stable condition.        ______________________________  MD MAGO Jacob/AQS  DD:  03/18/2024  Time:  06:06PM  DT:  03/19/2024  Time:  12:12AM  Job #:  [id number]      OPERATIVE REPORT

## 2024-03-19 NOTE — PROGRESS NOTES
[facility] - [facility]    Cardiology  Progress Note    Patient Name: Ran Reyes Jr.  MRN: [id number]  Admission Date: 3/17/2024  Hospital Length of Stay: 1 days  Code Status: Full Code   Attending Physician: Cassidy Obrien MD   Primary Care Physician: Shiela, Primary Doctor  Expected Discharge Date:   Principal Problem:Scrotal hematoma    Subjective:   Chief Complaint/Reason for Consult: OAC recs     HPI:   Ran Reyes Jr. Is a 68 year old male, known to Dr. Wagner, with PMH of  cardiomyopathy, systolic heart failure with an EF of 41%, VHD, chronic AFib on Xarelto, peripheral arterial disease, COPD, HTN, and a large left testicle hydrocele who presented to the ED 3/18/24 for scrotal bleeding with testicular swelling and pain. Found to have sepsis likely due to UTI, acute bronchitis. Also found in ED to be in Afib RVR on EKG, bp 90s/60s. BNP 1273, troponin 0.045 --> 0.055. Patient admits to dyspnea, cough, and wheezing. Denies chest pains. Cardiology being consulted for OAC recommendations.    Hospital Course:  3.19.24: Intubated/Mechanical Ventilation. AF SVR. On Vasopressor Support. Family at bedside.      PMH: Cardiomyopathy, systolic heart failure with an EF of 41%, VHD, chronic AFib on Xarelto, peripheral arterial disease, COPD, HTN, CAD (Details Unclear)  PSH: cardiac stent >10 years ago, L FA atherectomy and angioplasty, R SFA stent, cataract extraction,   Family History: Father MI at age 66.   Social History: 10+ pack year hx current smoker, social EtOH, denies drugs.     Previous Cardiac Diagnostics:   Echocardiogram (3.19.24):  Left Ventricle: The left ventricle is normal in size. Increased wall thickness. Unable to assess wall motion. There is moderately reduced systolic function with a visually estimated ejection fraction of 30 - 40%.  Right Ventricle: Mild right ventricular enlargement.  Left Atrium: Left atrium is moderately dilated.  Right Atrium: Right atrium is severely  dilated.  Mitral Valve: There is moderate regurgitation.  Tricuspid Valve: There is mild to moderate regurgitation.  Pulmonary Artery: Pulmonary artery pressure could not be estimated.  IVC/SVC: Patient is ventilated, cannot use IVC diameter to estimate right atrial pressure.    Echocardiogram (1.31.24):   Left Ventricle: The left ventricle is normal in size. Mildly increased wall thickness. There is reduced systolic function. Biplane (2D) method of discs ejection fraction is 41%. Unable to assess diastolic function due to atrial fibrillation.  Right Ventricle: Normal right ventricular cavity size. Systolic function is mildly reduced.  Left Atrium: Left atrium is severely dilated.  Right Atrium: Right atrium is severely dilated.  Aortic Valve: The aortic valve is a trileaflet valve.  Mitral Valve: There is bileaflet sclerosis. There is moderate to severe regurgitation.  Tricuspid Valve: There is mild regurgitation.  IVC/SVC: Normal venous pressure at 3 mmHg.    Carotid US (2.7.23):  The study quality is average.   1-39% stenosis in the proximal right internal carotid artery based on Bluth Criteria. Antegrade right vertebral artery flow.   1-39% stenosis in the proximal left internal carotid artery based on Bluth Criteria. Antegrade left vertebral artery flow.     Echocardiogram (11.8.22):  The study quality is average.   The left ventricle is moderately enlarged. Left ventricular diastolic dimension is 6.4 cms. Global left ventricular systolic function is moderately decreased. The left ventricular ejection fraction is 40%. Arrhythmia noted throughout much of the exam.   There is no evidence of mitral stenosis or prolapse appreciated. MV Ortiz score ~ 5. The area by planimetry is 5.3 cm². The mean trans mitral gradient is 1.6 mmHg. Suspect borderline severe (4+) mitral regurgitation with a posteriorly directed jet. MR PISA radius ~ 0.93 cm, MR ERO ~ 0.35 cm^2, MR regurgitant volume ~ 72 ml/beat, MR regurgitant  fraction ~ 55%, MR vena contracta ~ 0.54 cm.  Mild calcification of the aortic valve is noted with adequate cuspal excursion.   Trace pulmonic regurgitation. Mild (1+) tricuspid regurgitation.    Peripheral Angiogram (10.17.22):  Underwent Successful CSI Atherectomy Balloon Angioplasty and Stenting of the Right SFA and CSI Atherectomy Balloon Angioplasty of Right Anterior Tibial Artery Using Pedal Approach.    Peripheral Angiogram (9.12.22):  Underwent Successful Left SFA Laser Atherectomy Balloon Angioplasty Using Left Radial Artery Approach.    Review of Systems   Unable to perform ROS: Intubated     Objective:     Vital Signs (Most Recent):  Temp: 97.1 °F (36.2 °C) (03/19/24 0800)  Pulse: 63 (03/19/24 1013)  Resp: 20 (03/19/24 1013)  BP: 133/69 (03/19/24 1002)  SpO2: 96 % (03/19/24 1013) Vital Signs (24h Range):  Temp:  [97.1 °F (36.2 °C)-98.9 °F (37.2 °C)] 97.1 °F (36.2 °C)  Pulse:  [] 63  Resp:  [12-28] 20  SpO2:  [91 %-100 %] 96 %  BP: ()/(46-98) 133/69   Weight: 90 kg (198 lb 6.6 oz)  Body mass index is 27.67 kg/m².  SpO2: 96 %       Intake/Output Summary (Last 24 hours) at 3/19/2024 1052  Last data filed at 3/19/2024 0800  Gross per 24 hour   Intake 3790.09 ml   Output 1800 ml   Net 1990.09 ml     Lines/Drains/Airways       Central Venous Catheter Line  Duration             Percutaneous Central Line - Triple Lumen  03/19/24 0400 Internal Jugular Right <1 day              Drain  Duration                  NG/OG Tube 03/18/24 2200 Center mouth <1 day         Urethral Catheter 03/18/24 1426 Straight-tip 16 Fr. <1 day              Airway  Duration                  Airway - Non-Surgical Endotracheal Tube -- days              Peripheral Intravenous Line  Duration                  Peripheral IV - Single Lumen 03/18/24 1321 20 G Left;Posterior Hand <1 day         Peripheral IV - Single Lumen 03/18/24 1930 20 G Distal;Posterior;Right Forearm <1 day         Peripheral IV - Single Lumen 03/18/24 2300 20 G  Distal;Left;Posterior Forearm <1 day         Peripheral IV - Single Lumen 03/19/24 0107 18 G Anterior;Proximal;Right Forearm <1 day                  Significant Labs:   Recent Results (from the past 72 hour(s))   Comprehensive metabolic panel    Collection Time: 03/17/24  9:03 PM   Result Value Ref Range    Sodium Level 137 136 - 145 mmol/L    Potassium Level 3.5 3.5 - 5.1 mmol/L    Chloride 101 98 - 107 mmol/L    Carbon Dioxide 21 (L) 23 - 31 mmol/L    Glucose Level 99 82 - 115 mg/dL    Blood Urea Nitrogen 16.6 8.4 - 25.7 mg/dL    Creatinine 0.99 0.73 - 1.18 mg/dL    Calcium Level Total 7.4 (L) 8.8 - 10.0 mg/dL    Protein Total 5.4 (L) 5.8 - 7.6 gm/dL    Albumin Level 2.1 (L) 3.4 - 4.8 g/dL    Globulin 3.3 2.4 - 3.5 gm/dL    Albumin/Globulin Ratio 0.6 (L) 1.1 - 2.0 ratio    Bilirubin Total 1.0 <=1.5 mg/dL    Alkaline Phosphatase 76 40 - 150 unit/L    Alanine Aminotransferase 27 0 - 55 unit/L    Aspartate Aminotransferase 112 (H) 5 - 34 unit/L    eGFR >60 mls/min/1.73/m2   Brain natriuretic peptide    Collection Time: 03/17/24  9:03 PM   Result Value Ref Range    Natriuretic Peptide 1,273.3 (H) <=100.0 pg/mL   APTT    Collection Time: 03/17/24  9:03 PM   Result Value Ref Range    PTT 32.4 23.2 - 33.7 seconds   Protime-INR    Collection Time: 03/17/24  9:03 PM   Result Value Ref Range    PT 19.1 (H) 12.5 - 14.5 seconds    INR 1.6 (H) <=1.3   Magnesium    Collection Time: 03/17/24  9:03 PM   Result Value Ref Range    Magnesium Level 2.20 1.60 - 2.60 mg/dL   Troponin I    Collection Time: 03/17/24  9:03 PM   Result Value Ref Range    Troponin-I 0.063 (H) 0.000 - 0.045 ng/mL   Blood culture #2 **CANNOT BE ORDERED STAT**    Collection Time: 03/17/24  9:03 PM    Specimen: Blood   Result Value Ref Range    CULTURE, BLOOD ([facility]) No Growth At 24 Hours    Blood culture #1 **CANNOT BE ORDERED STAT**    Collection Time: 03/17/24  9:03 PM    Specimen: Blood   Result Value Ref Range    CULTURE, BLOOD ([facility]) No Growth At 24 Hours     CBC with Differential    Collection Time: 03/17/24  9:03 PM   Result Value Ref Range    WBC 15.53 (H) 4.50 - 11.50 x10(3)/mcL    RBC 3.33 (L) 4.70 - 6.10 x10(6)/mcL    Hgb 10.3 (L) 14.0 - 18.0 g/dL    Hct 31.8 (L) 42.0 - 52.0 %    MCV 95.5 (H) 80.0 - 94.0 fL    MCH 30.9 27.0 - 31.0 pg    MCHC 32.4 (L) 33.0 - 36.0 g/dL    RDW 15.9 11.5 - 17.0 %    Platelet 141 130 - 400 x10(3)/mcL    MPV 11.8 (H) 7.4 - 10.4 fL    NRBC% 0.0 %    IPF 12.5 (H) 0.9 - 11.2 %   Manual Differential    Collection Time: 03/17/24  9:03 PM   Result Value Ref Range    Neutrophils % 91 %    Lymphs % 4 %    Monocytes % 4 %    Neutrophils Abs      Poikilocytosis 1+ (A) (none)    Macrocytosis 1+ (A) (none)    Monument Cells 1+ (A) (none)   Lactic acid, plasma    Collection Time: 03/17/24  9:09 PM   Result Value Ref Range    Lactic Acid Level 2.9 (H) 0.5 - 2.2 mmol/L   Lactic Acid, Plasma    Collection Time: 03/17/24 11:02 PM   Result Value Ref Range    Lactic Acid Level 1.8 0.5 - 2.2 mmol/L   Urinalysis, Reflex to Urine Culture    Collection Time: 03/18/24 12:34 AM    Specimen: Urine   Result Value Ref Range    Color, UA Yellow Yellow, Light-Yellow, Colorless, Straw, Dark-Yellow    Appearance, UA Clear Clear    Specific Gravity, UA >1.050 (H) 1.005 - 1.030    pH, UA 6.5 5.0 - 8.5    Protein, UA 1+ (A) Negative    Glucose, UA 1+ (A) Negative, Normal    Ketones, UA Negative Negative    Blood, UA Trace (A) Negative    Bilirubin, UA Negative Negative    Urobilinogen, UA 2.0 (A) 0.2, 1.0, Normal    Nitrites, UA Negative Negative    Leukocyte Esterase,  (A) Negative    WBC, UA >100 (A) None Seen, 0-2, 3-5, 0-5 /HPF    Bacteria, UA Many (A) None Seen, Trace /HPF    Squamous Epithelial Cells, UA Trace None Seen /HPF    Mucous, UA Trace (A) None Seen /LPF    RBC, UA 11-20 (A) None Seen, 0-2, 3-5, 0-5 /HPF   Urine culture    Collection Time: 03/18/24 12:34 AM    Specimen: Urine   Result Value Ref Range    Urine Culture No other significant growth      Urine Culture 10,000 - 25,000 colonies/ml Yeast species (A)    Troponin I    Collection Time: 03/18/24  1:32 AM   Result Value Ref Range    Troponin-I 0.045 0.000 - 0.045 ng/mL   Comprehensive metabolic panel    Collection Time: 03/18/24  5:33 AM   Result Value Ref Range    Sodium Level 137 136 - 145 mmol/L    Potassium Level 3.3 (L) 3.5 - 5.1 mmol/L    Chloride 99 98 - 107 mmol/L    Carbon Dioxide 28 23 - 31 mmol/L    Glucose Level 99 82 - 115 mg/dL    Blood Urea Nitrogen 16.3 8.4 - 25.7 mg/dL    Creatinine 0.87 0.73 - 1.18 mg/dL    Calcium Level Total 7.4 (L) 8.8 - 10.0 mg/dL    Protein Total 5.2 (L) 5.8 - 7.6 gm/dL    Albumin Level 2.0 (L) 3.4 - 4.8 g/dL    Globulin 3.2 2.4 - 3.5 gm/dL    Albumin/Globulin Ratio 0.6 (L) 1.1 - 2.0 ratio    Bilirubin Total 0.8 <=1.5 mg/dL    Alkaline Phosphatase 73 40 - 150 unit/L    Alanine Aminotransferase 21 0 - 55 unit/L    Aspartate Aminotransferase 67 (H) 5 - 34 unit/L    eGFR >60 mls/min/1.73/m2   Troponin I    Collection Time: 03/18/24  5:33 AM   Result Value Ref Range    Troponin-I 0.055 (H) 0.000 - 0.045 ng/mL   CBC with Differential    Collection Time: 03/18/24  5:33 AM   Result Value Ref Range    WBC 11.97 (H) 4.50 - 11.50 x10(3)/mcL    RBC 3.40 (L) 4.70 - 6.10 x10(6)/mcL    Hgb 10.5 (L) 14.0 - 18.0 g/dL    Hct 32.4 (L) 42.0 - 52.0 %    MCV 95.3 (H) 80.0 - 94.0 fL    MCH 30.9 27.0 - 31.0 pg    MCHC 32.4 (L) 33.0 - 36.0 g/dL    RDW 15.8 11.5 - 17.0 %    Platelet 136 130 - 400 x10(3)/mcL    MPV 12.0 (H) 7.4 - 10.4 fL    NRBC% 0.0 %    IPF 12.9 (H) 0.9 - 11.2 %   Manual Differential    Collection Time: 03/18/24  5:33 AM   Result Value Ref Range    WBC 11.97 x10(3)/mcL    Neutrophils % 95 %    Lymphs % 4 %    Monocytes % 1 %    Neutrophils Abs 11.3715 (H) 2.1 - 9.2 x10(3)/mcL    Lymphs Abs 0.4788 (L) 0.6 - 4.6 x10(3)/mcL    Monocytes Abs 0.1197 0.1 - 1.3 x10(3)/mcL    Platelets Decreased (A) Normal, Adequate    RBC Morph Abnormal (A) Normal    Poikilocytosis 1+ (A) (none)     Macrocytosis 1+ (A) (none)    De Smet Cells 1+ (A) (none)    Giant Platelets 1+     Vacuolated Grans 1+ (A) (none)   Wound Culture    Collection Time: 03/18/24  3:02 PM    Specimen: Scrotum; Abscess   Result Value Ref Range    Wound Culture No Growth At 24 Hours    Phosphorus    Collection Time: 03/18/24 10:26 PM   Result Value Ref Range    Phosphorus Level 3.2 2.3 - 4.7 mg/dL   Magnesium    Collection Time: 03/18/24 10:26 PM   Result Value Ref Range    Magnesium Level 2.20 1.60 - 2.60 mg/dL   Comprehensive Metabolic Panel    Collection Time: 03/18/24 10:26 PM   Result Value Ref Range    Sodium Level 133 (L) 136 - 145 mmol/L    Potassium Level 3.3 (L) 3.5 - 5.1 mmol/L    Chloride 99 98 - 107 mmol/L    Carbon Dioxide 18 (L) 23 - 31 mmol/L    Glucose Level 159 (H) 82 - 115 mg/dL    Blood Urea Nitrogen 16.2 8.4 - 25.7 mg/dL    Creatinine 0.94 0.73 - 1.18 mg/dL    Calcium Level Total 7.4 (L) 8.8 - 10.0 mg/dL    Protein Total 6.7 5.8 - 7.6 gm/dL    Albumin Level 2.3 (L) 3.4 - 4.8 g/dL    Globulin 4.4 (H) 2.4 - 3.5 gm/dL    Albumin/Globulin Ratio 0.5 (L) 1.1 - 2.0 ratio    Bilirubin Total 0.8 <=1.5 mg/dL    Alkaline Phosphatase 67 40 - 150 unit/L    Alanine Aminotransferase 16 0 - 55 unit/L    Aspartate Aminotransferase 64 (H) 5 - 34 unit/L    eGFR >60 mls/min/1.73/m2   CBC with Differential    Collection Time: 03/18/24 10:26 PM   Result Value Ref Range    WBC 11.77 (H) 4.50 - 11.50 x10(3)/mcL    RBC 2.56 (L) 4.70 - 6.10 x10(6)/mcL    Hgb 8.2 (L) 14.0 - 18.0 g/dL    Hct 25.1 (L) 42.0 - 52.0 %    MCV 98.0 (H) 80.0 - 94.0 fL    MCH 32.0 (H) 27.0 - 31.0 pg    MCHC 32.7 (L) 33.0 - 36.0 g/dL    RDW 16.4 11.5 - 17.0 %    Platelet 117 (L) 130 - 400 x10(3)/mcL    MPV 13.2 (H) 7.4 - 10.4 fL    Neut % 84.9 %    Lymph % 5.2 %    Mono % 8.2 %    Eos % 0.0 %    Basophil % 0.6 %    Lymph # 0.61 0.6 - 4.6 x10(3)/mcL    Neut # 10.00 (H) 2.1 - 9.2 x10(3)/mcL    Mono # 0.96 0.1 - 1.3 x10(3)/mcL    Eos # 0.00 0 - 0.9 x10(3)/mcL    Baso # 0.07  <=0.2 x10(3)/mcL    IG# 0.13 (H) 0 - 0.04 x10(3)/mcL    IG% 1.1 %    NRBC% 0.0 %    IPF 13.9 (H) 0.9 - 11.2 %   Lactic Acid, Plasma    Collection Time: 03/18/24 10:26 PM   Result Value Ref Range    Lactic Acid Level 3.7 (HH) 0.5 - 2.2 mmol/L   Ferritin    Collection Time: 03/18/24 10:26 PM   Result Value Ref Range    Ferritin Level 1,101.88 (H) 21.81 - 274.66 ng/mL   Vitamin B12    Collection Time: 03/18/24 10:26 PM   Result Value Ref Range    Vitamin B12 Level 1,078 (H) 213 - 816 pg/mL   Folate    Collection Time: 03/18/24 10:26 PM   Result Value Ref Range    Folate Level 10.0 7.0 - 31.4 ng/mL   Iron and TIBC    Collection Time: 03/18/24 10:26 PM   Result Value Ref Range    Iron Binding Capacity Unsaturated 124 69 - 240 ug/dL    Iron Level 27 (L) 65 - 175 ug/dL    Transferrin 96 (L) 163 - 344 mg/dL    Iron Binding Capacity Total 151 (L) 250 - 450 ug/dL    Iron Saturation 18 (L) 20 - 50 %   Reticulocytes    Collection Time: 03/18/24 10:26 PM   Result Value Ref Range    Retic Cnt Auto 1.14 1.1 - 2.1 %    RET# 0.0292 0.026 - 0.095 x10e6/uL   Lipid Panel    Collection Time: 03/18/24 10:26 PM   Result Value Ref Range    Cholesterol Total 46 <=200 mg/dL    HDL Cholesterol <5 (L) 35 - 60 mg/dL    Triglyceride 1,569 (H) 34 - 140 mg/dL    Cholesterol/HDL Ratio     Lactate Dehydrogenase    Collection Time: 03/18/24 10:26 PM   Result Value Ref Range    Lactate Dehydrogenase 269 (H) 125 - 220 U/L   Haptoglobin    Collection Time: 03/18/24 10:26 PM   Result Value Ref Range    Haptoglobin 347 40 - 368 mg/dL   T4, Free    Collection Time: 03/18/24 10:26 PM   Result Value Ref Range    Thyroxine Free 1.30 0.70 - 1.48 ng/dL   TSH    Collection Time: 03/18/24 10:26 PM   Result Value Ref Range    TSH 1.097 0.350 - 4.940 uIU/mL   Hemoglobin A1C    Collection Time: 03/18/24 10:26 PM   Result Value Ref Range    Hemoglobin A1c 5.0 <=7.0 %    Estimated Average Glucose 96.8 mg/dL   PTH, Intact    Collection Time: 03/18/24 10:26 PM   Result  Value Ref Range    Parathyroid Hormone Intact 60.0 8.7 - 77.0 pg/mL   Troponin I    Collection Time: 03/18/24 10:26 PM   Result Value Ref Range    Troponin-I 0.058 (H) 0.000 - 0.045 ng/mL   CK    Collection Time: 03/18/24 10:26 PM   Result Value Ref Range    Creatine Kinase 95 30 - 200 U/L   CK-MB    Collection Time: 03/18/24 10:26 PM   Result Value Ref Range    Creatine Kinase MB 2.0 <=7.2 ng/mL   RT Blood Gas    Collection Time: 03/18/24 10:42 PM   Result Value Ref Range    Sample Type Arterial Blood     Sample site Right Radial Artery     Drawn by TDL RRT     pH, Blood gas 7.350 7.350 - 7.450    pCO2, Blood gas 48.0 (H) 35.0 - 45.0 mmHg    pO2, Blood gas 114.0 (H) 80.0 - 100.0 mmHg    Sodium, Blood Gas 134 (L) 137 - 145 mmol/L    Potassium, Blood Gas 2.9 (L) 3.5 - 5.0 mmol/L    Calcium Level Ionized 1.05 (L) 1.12 - 1.23 mmol/L    TOC2, Blood gas 28.0 mmol/L    Base Excess, Blood gas 0.60 -2.00 - 2.00 mmol/L    sO2, Blood gas 98.2 %    HCO3, Blood gas 26.5 (H) 22.0 - 26.0 mmol/L    THb, Blood gas 9.0 (L) 12 - 16 g/dL    O2 Hb, Blood Gas 97.3 (H) 94.0 - 97.0 %    CO Hgb 2.4 (H) 0.5 - 1.5 %    Met Hgb 0.3 (L) 0.4 - 1.5 %    Allens Test Yes     MODE SIMV     FIO2, Blood gas 50 %    Mech Vt 500 ml    Mech RR 18 b/min    PEEP 5.0 cmH2O    PS 10.0 cmH2O   SYPHILIS ANTIBODY (WITH REFLEX RPR)    Collection Time: 03/19/24 12:20 AM   Result Value Ref Range    Syphilis Antibody Nonreactive Nonreactive, Equivocal   Comprehensive metabolic panel    Collection Time: 03/19/24 12:20 AM   Result Value Ref Range    Sodium Level 129 (L) 136 - 145 mmol/L    Potassium Level 3.0 (L) 3.5 - 5.1 mmol/L    Chloride 95 (L) 98 - 107 mmol/L    Carbon Dioxide 18 (L) 23 - 31 mmol/L    Glucose Level 515 (HH) 82 - 115 mg/dL    Blood Urea Nitrogen 18.9 8.4 - 25.7 mg/dL    Creatinine 1.16 0.73 - 1.18 mg/dL    Calcium Level Total 7.7 (L) 8.8 - 10.0 mg/dL    Protein Total 5.7 (L) 5.8 - 7.6 gm/dL    Albumin Level 2.3 (L) 3.4 - 4.8 g/dL    Globulin 3.4  2.4 - 3.5 gm/dL    Albumin/Globulin Ratio 0.7 (L) 1.1 - 2.0 ratio    Bilirubin Total 0.8 <=1.5 mg/dL    Alkaline Phosphatase 75 40 - 150 unit/L    Alanine Aminotransferase 15 0 - 55 unit/L    Aspartate Aminotransferase 32 5 - 34 unit/L    eGFR >60 mls/min/1.73/m2   Phosphorus    Collection Time: 03/19/24 12:20 AM   Result Value Ref Range    Phosphorus Level 3.2 2.3 - 4.7 mg/dL   Magnesium    Collection Time: 03/19/24 12:20 AM   Result Value Ref Range    Magnesium Level 2.30 1.60 - 2.60 mg/dL   Lipase    Collection Time: 03/19/24 12:20 AM   Result Value Ref Range    Lipase Level 5 <=60 U/L   Amylase    Collection Time: 03/19/24 12:20 AM   Result Value Ref Range    Amylase Level 35 25 - 125 unit/L   Beta-Hydroxybutyrate, Serum    Collection Time: 03/19/24 12:20 AM   Result Value Ref Range    Beta Hydroxybutyrate 1.00 (H) <=0.30 mmol/L   CBC with Differential    Collection Time: 03/19/24 12:21 AM   Result Value Ref Range    WBC 9.42 4.50 - 11.50 x10(3)/mcL    RBC 2.82 (L) 4.70 - 6.10 x10(6)/mcL    Hgb 8.9 (L) 14.0 - 18.0 g/dL    Hct 26.9 (L) 42.0 - 52.0 %    MCV 95.4 (H) 80.0 - 94.0 fL    MCH 31.6 (H) 27.0 - 31.0 pg    MCHC 33.1 33.0 - 36.0 g/dL    RDW 16.1 11.5 - 17.0 %    Platelet 134 130 - 400 x10(3)/mcL    MPV 11.9 (H) 7.4 - 10.4 fL    NRBC% 0.0 %    IPF 10.9 0.9 - 11.2 %   Manual Differential    Collection Time: 03/19/24 12:21 AM   Result Value Ref Range    WBC 9.42 x10(3)/mcL    Neutrophils % 92 %    Lymphs % 4 %    Monocytes % 4 %    Neutrophils Abs 8.6664 2.1 - 9.2 x10(3)/mcL    Lymphs Abs 0.3768 (L) 0.6 - 4.6 x10(3)/mcL    Monocytes Abs 0.3768 0.1 - 1.3 x10(3)/mcL    Platelets Normal Normal, Adequate    RBC Morph Abnormal (A) Normal    Anisocytosis 1+ (A) (none)    Macrocytosis 1+ (A) (none)   Echo Saline Bubble? Yes    Collection Time: 03/19/24 12:29 AM   Result Value Ref Range    Quintana's Biplane MOD Ejection Fraction 39 %    LVOT stroke volume 46.73 cm3    LVIDd 5.61 3.5 - 6.0 cm    LV Systolic Volume  98.80 mL    LVIDs 4.63 (A) 2.1 - 4.0 cm    LV Diastolic Volume 154.00 mL    IVS 1.16 (A) 0.6 - 1.1 cm    LVOT diameter 2.20 cm    LVOT area 3.8 cm2    FS 17 (A) 28 - 44 %    Left Ventricle Relative Wall Thickness 0.42 cm    Posterior Wall 1.19 (A) 0.6 - 1.1 cm    LV mass 273.33 g    MV Peak E Stuart 1.18 m/s    TDI LATERAL 0.10 m/s    TDI SEPTAL 0.07 m/s    E/E' ratio 13.88 m/s    MV Peak A Stuart 0.25 m/s    TR Max Stuart 2.38 m/s    E/A ratio 4.72     E wave deceleration time 209.00 msec    LV SEPTAL E/E' RATIO 16.86 m/s    LV LATERAL E/E' RATIO 11.80 m/s    LVOT peak stuart 0.74 m/s    Left Ventricular Outflow Tract Mean Velocity 0.45 cm/s    Left Ventricular Outflow Tract Mean Gradient 1.00 mmHg    RV mid diameter 3.60 cm    RV S' 11.00 cm/s    TAPSE 2.28 cm    LA size 4.90 cm    LA volume (mod) 117.00 cm3    RA Major Axis 6.54 cm    RA Width 6.18 cm    AV mean gradient 3 mmHg    AV peak gradient 7 mmHg    Ao peak stuart 1.28 m/s    Ao VTI 21.70 cm    LVOT peak VTI 12.30 cm    AV valve area 2.15 cm²    AV Velocity Ratio 0.58     AV index (prosthetic) 0.57     HODAN by Velocity Ratio 2.20 cm²    Mr max stuart 4.63 m/s    MV mean gradient 3 mmHg    MV peak gradient 5 mmHg    MV stenosis pressure 1/2 time 45.00 ms    MV valve area p 1/2 method 4.89 cm2    MV valve area by continuity eq 2.45 cm2    MV VTI 19.1 cm    Triscuspid Valve Regurgitation Peak Gradient 23 mmHg    PV PEAK VELOCITY 1.25 m/s    PV peak gradient 6 mmHg    Mean e' 0.09 m/s   Drug Screen, Urine    Collection Time: 03/19/24  1:28 AM   Result Value Ref Range    Amphetamines, Urine Negative Negative    Barbituates, Urine Negative Negative    Benzodiazepine, Urine Negative Negative    Cannabinoids, Urine Negative Negative    Cocaine, Urine Negative Negative    Fentanyl, Urine Positive (A) Negative    MDMA, Urine Negative Negative    Opiates, Urine Positive (A) Negative    Phencyclidine, Urine Negative Negative    pH, Urine 6.0 3.0 - 11.0    Specific Gravity, Urine Auto  1.027 1.001 - 1.035   Urinalysis, Reflex to Urine Culture    Collection Time: 03/19/24  1:28 AM    Specimen: Urine   Result Value Ref Range    Color, UA Yellow Yellow, Light-Yellow, Colorless, Straw, Dark-Yellow    Appearance, UA Clear Clear    Specific Gravity, UA 1.027 1.005 - 1.030    pH, UA 6.0 5.0 - 8.5    Protein, UA 1+ (A) Negative    Glucose, UA 3+ (A) Negative, Normal    Ketones, UA 1+ (A) Negative    Blood, UA 1+ (A) Negative    Bilirubin, UA Negative Negative    Urobilinogen, UA Normal 0.2, 1.0, Normal    Nitrites, UA Negative Negative    Leukocyte Esterase,  (A) Negative    WBC, UA 51-99 (A) None Seen, 0-2, 3-5, 0-5 /HPF    Bacteria, UA Trace None Seen, Trace /HPF    Squamous Epithelial Cells, UA Trace None Seen /HPF    Mucous, UA Trace (A) None Seen /LPF    RBC, UA 0-5 None Seen, 0-2, 3-5, 0-5 /HPF   Chlamydia/GC, PCR    Collection Time: 03/19/24  1:28 AM    Specimen: Urine   Result Value Ref Range    Chlamydia trachomatis PCR Not Detected Not Detected    N. gonorrhea PCR Not Detected Not Detected    Source Urine    POCT glucose    Collection Time: 03/19/24  2:04 AM   Result Value Ref Range    POCT Glucose 239 (H) 70 - 110 mg/dL   MRSA PCR    Collection Time: 03/19/24  2:13 AM   Result Value Ref Range    MRSA PCR SCRN ([facility]) Not Detected Not Detected   Respiratory Panel    Collection Time: 03/19/24  2:13 AM   Result Value Ref Range    Adenovirus Not Detected Not Detected    Coronavirus 229E Not Detected Not Detected    Coronavirus HKU1 Not Detected Not Detected    Coronavirus NL63 Not Detected Not Detected    Coronavirus OC43 PCR, Common Cold Virus Not Detected Not Detected    Human Metapneumovirus Detected (A) Not Detected    Parainfluenza Virus 1 Not Detected Not Detected    Parainfluenza Virus 2 Not Detected Not Detected    Parainfluenza Virus 3 Not Detected Not Detected    Parainfluenza Virus 4 Not Detected Not Detected    Bordetella pertussis (ptxP) Not Detected Not Detected    Chlamydia  pneumoniae Not Detected Not Detected    Mycoplasma pneumoniae Not Detected Not Detected    Human Rhinovirus/Enterovirus Not Detected Not Detected    Bordetella parapertussis ([id number]) Not Detected Not Detected   COVID/RSV/FLU A&B PCR    Collection Time: 03/19/24  2:13 AM   Result Value Ref Range    Influenza A PCR Not Detected Not Detected    Influenza B PCR Not Detected Not Detected    Respiratory Syncytial Virus PCR Not Detected Not Detected    SARS-CoV-2 PCR Not Detected Not Detected, Negative   Comprehensive metabolic panel    Collection Time: 03/19/24  5:37 AM   Result Value Ref Range    Sodium Level 137 136 - 145 mmol/L    Potassium Level 4.2 3.5 - 5.1 mmol/L    Chloride 101 98 - 107 mmol/L    Carbon Dioxide 25 23 - 31 mmol/L    Glucose Level 217 (H) 82 - 115 mg/dL    Blood Urea Nitrogen 15.3 8.4 - 25.7 mg/dL    Creatinine 0.83 0.73 - 1.18 mg/dL    Calcium Level Total 8.1 (L) 8.8 - 10.0 mg/dL    Protein Total 5.2 (L) 5.8 - 7.6 gm/dL    Albumin Level 2.3 (L) 3.4 - 4.8 g/dL    Globulin 2.9 2.4 - 3.5 gm/dL    Albumin/Globulin Ratio 0.8 (L) 1.1 - 2.0 ratio    Bilirubin Total 0.5 <=1.5 mg/dL    Alkaline Phosphatase 61 40 - 150 unit/L    Alanine Aminotransferase 16 0 - 55 unit/L    Aspartate Aminotransferase 28 5 - 34 unit/L    eGFR >60 mls/min/1.73/m2   Magnesium    Collection Time: 03/19/24  5:37 AM   Result Value Ref Range    Magnesium Level 2.40 1.60 - 2.60 mg/dL   Lactic Acid, Plasma    Collection Time: 03/19/24  5:37 AM   Result Value Ref Range    Lactic Acid Level 1.3 0.5 - 2.2 mmol/L   Phosphorus    Collection Time: 03/19/24  5:37 AM   Result Value Ref Range    Phosphorus Level 3.3 2.3 - 4.7 mg/dL   CBC with Differential    Collection Time: 03/19/24  5:37 AM   Result Value Ref Range    WBC 9.93 4.50 - 11.50 x10(3)/mcL    RBC 2.88 (L) 4.70 - 6.10 x10(6)/mcL    Hgb 8.7 (L) 14.0 - 18.0 g/dL    Hct 27.5 (L) 42.0 - 52.0 %    MCV 95.5 (H) 80.0 - 94.0 fL    MCH 30.2 27.0 - 31.0 pg    MCHC 31.6 (L) 33.0 - 36.0 g/dL     RDW 16.1 11.5 - 17.0 %    Platelet 151 130 - 400 x10(3)/mcL    MPV 11.9 (H) 7.4 - 10.4 fL    NRBC% 0.0 %   Triglycerides    Collection Time: 03/19/24  5:37 AM   Result Value Ref Range    Triglyceride 81 34 - 140 mg/dL   Manual Differential    Collection Time: 03/19/24  5:37 AM   Result Value Ref Range    WBC 9.93 x10(3)/mcL    Neutrophils % 88 %    Lymphs % 5 %    Monocytes % 6 %    Metamyelocytes % 1 (H) <=0 %    Neutrophils Abs 8.7384 2.1 - 9.2 x10(3)/mcL    Lymphs Abs 0.4965 (L) 0.6 - 4.6 x10(3)/mcL    Monocytes Abs 0.5958 0.1 - 1.3 x10(3)/mcL    Platelets Normal Normal, Adequate    RBC Morph Abnormal (A) Normal    Poikilocytosis 1+ (A) (none)    Anisocytosis 1+ (A) (none)    Macrocytosis 1+ (A) (none)    Giant Platelets 1+    RT Blood Gas    Collection Time: 03/19/24  5:40 AM   Result Value Ref Range    Sample Type Arterial Blood     Sample site Left Radial Artery     Drawn by sd rrt     pH, Blood gas 7.440 7.350 - 7.450    pCO2, Blood gas 45.0 35.0 - 45.0 mmHg    pO2, Blood gas 96.0 80.0 - 100.0 mmHg    Sodium, Blood Gas 134 (L) 137 - 145 mmol/L    Potassium, Blood Gas 3.7 3.5 - 5.0 mmol/L    Calcium Level Ionized 1.11 (L) 1.12 - 1.23 mmol/L    TOC2, Blood gas 32.0 mmol/L    Base Excess, Blood gas 5.60 mmol/L    sO2, Blood gas 98.0 %    HCO3, Blood gas 30.6 (H) 22.0 - 26.0 mmol/L    Allens Test Yes     MODE SIMV     Oxygen Device, Blood gas Ventilator     FIO2, Blood gas 30 %    Mech Vt 500 ml    Mech RR 18 b/min    PEEP 5.0 cmH2O    PS 10.0 cmH2O     EKG:      Telemetry:  AF SVR    Physical Exam  Vitals and nursing note reviewed.   Constitutional:       General: He is not in acute distress.     Appearance: He is ill-appearing.   HENT:      Mouth/Throat:      Mouth: Mucous membranes are moist.   Cardiovascular:      Rate and Rhythm: Bradycardia present. Rhythm irregular.   Pulmonary:      Effort: Pulmonary effort is normal. No respiratory distress.      Comments: Ventilator Associated Breath Sounds-  Intubated/Mechanical Ventilation FIO2 30%  Genitourinary:     Comments: Scrotal Sling in Place.   Musculoskeletal:      Cervical back: Neck supple.      Right lower leg: No edema.      Left lower leg: No edema.      Comments: Bilateral Lower Extremities Warm to Touch.       Current Inpatient Medications:    Current Facility-Administered Medications:     acetaminophen tablet 650 mg, 650 mg, Oral, Q8H PRN, Harris Hernandez MD    amiodarone 360 mg/200 mL (1.8 mg/mL) infusion, 1 mg/min, Intravenous, Continuous, David Gonsalez MD, Last Rate: 20 mL/hr at 03/19/24 0321, 1 mg/min at 03/19/24 0321    amiodarone 360 mg/200 mL (1.8 mg/mL) infusion, 0.5 mg/min, Intravenous, Continuous, David Gonsalez MD, Last Rate: 16.7 mL/hr at 03/19/24 0700, 0.5 mg/min at 03/19/24 0700    atorvastatin tablet 80 mg, 80 mg, Oral, Daily, David Gonsalez MD, 80 mg at 03/19/24 1050    chlorhexidine 0.12 % solution 15 mL, 15 mL, Mouth/Throat, BID, David Gonsalez MD, 15 mL at 03/19/24 1050    dexmedetomidine (PRECEDEX) 400mcg/100mL 0.9% NaCL infusion, 0-1.4 mcg/kg/hr, Intravenous, Continuous, David Gonsalez MD, Last Rate: 9 mL/hr at 03/19/24 0700, 0.4 mcg/kg/hr at 03/19/24 0700    dextrose 10 % infusion, , Intravenous, PRNSunshine Andrew, MD    dextrose 10 % infusion, , Intravenous, PRNSunshine Andrew, MD    dextrose 10% bolus 125 mL 125 mL, 12.5 g, Intravenous, PRNSunshine Andrew, MD    dextrose 10% bolus 250 mL 250 mL, 25 g, Intravenous, PRNSunshine Andrew, MD    dextrose 5 % and 0.45 % NaCl infusion, , Intravenous, Continuous PRNSunshine Andrew, MD    dextrose 5 % in lactated ringers infusion, , Intravenous, Continuous, David Gonsalez MD    DOBUtamine 1000 mg in D5W 250 mL infusion, 0-10 mcg/kg/min, Intravenous, Continuous, David Gonsalez MD, Last Rate: 6.8 mL/hr at 03/19/24 0752, 5 mcg/kg/min at 03/19/24 0752    fentanyl IV bolus from bag/infusion 50 mcg, 50 mcg, Intravenous, Q30 Min PRN, Sunshine,  MD David    insulin regular in 0.9 % NaCl 100 unit/100 mL (1 unit/mL) infusion, 0-52 Units/hr, Intravenous, Continuous, David Gonsalez MD    ipratropium 0.02 % nebulizer solution 0.5 mg, 0.5 mg, Nebulization, Q6H PRN, David Gonsalez MD    levalbuterol nebulizer solution 0.63 mg, 0.63 mg, Nebulization, Q6H PRN, David Gonsalez MD    melatonin tablet 6 mg, 6 mg, Oral, Nightly PRN, Harris Hernandez MD    midazolam (PF) in 0.9 % NaCl 1 mg/mL infusion, 0-5 mg/hr, Intravenous, Continuous, Cassidy Obrien MD, Last Rate: 1 mL/hr at 03/19/24 0830, 1 mg/hr at 03/19/24 0830    midazolam (VERSED) 1 mg/mL injection 2 mg, 2 mg, Intravenous, Once, David Gonsalez MD    midazolam (VERSED) 5 mg/mL injection, , , ,     NORepinephrine 8 mg in dextrose 5% 250 mL infusion, 0-3 mcg/kg/min, Intravenous, Continuous, David Gonsalez MD, Last Rate: 3.4 mL/hr at 03/19/24 0700, 0.02 mcg/kg/min at 03/19/24 0700    oxyCODONE immediate release tablet 5 mg, 5 mg, Oral, Q4H PRN, Harris Hernandez MD, 5 mg at 03/18/24 0632    piperacillin-tazobactam (ZOSYN) 4.5 g in dextrose 5 % in water (D5W) 100 mL IVPB (MB+), 4.5 g, Intravenous, Q8H, Harris Hernandez MD, Stopped at 03/19/24 1026    propofol (DIPRIVAN) 10 mg/mL infusion, 0-50 mcg/kg/min, Intravenous, Continuous, David Gonsalez MD, Held at 03/19/24 0715    sodium chloride 0.9% flush 10 mL, 10 mL, Intravenous, PRN, Narendra Pradhan, DO    sodium chloride 0.9% flush 10 mL, 10 mL, Intravenous, PRN, David Gonsalez MD    thiamine (B-1) 500 mg in dextrose 5 % (D5W) 100 mL IVPB, 500 mg, Intravenous, TID, David Gonsalez MD, Stopped at 03/19/24 0922    Pharmacy to dose Vancomycin consult, , , Once **AND** vancomycin - pharmacy to dose, , Intravenous, pharmacy to manage frequency, Harris Hernandez MD    vancomycin in dextrose 5 % 1 gram/250 mL IVPB 1,000 mg, 1,000 mg, Intravenous, Q12H, Harris Hernandez MD, Last Rate: 166.7 mL/hr at 03/19/24 1051, 1,000 mg at  03/19/24 1051    Facility-Administered Medications Ordered in Other Encounters:     0.9%  NaCl infusion, , Intravenous, Continuous, Shannon Milligan MD, Last Rate: 10 mL/hr at 03/09/23 1020, New Bag at 03/09/23 1020    diphenhydrAMINE injection 25 mg, 25 mg, Intravenous, Once PRN, Shannon Milligan MD    HYDROmorphone injection 0.2 mg, 0.2 mg, Intravenous, Q5 Min PRN, Shannon Mliligan MD    HYDROmorphone injection 0.5 mg, 0.5 mg, Intravenous, Q5 Min PRN, Shannon Milligan MD    LIDOcaine (PF) 10 mg/ml (1%) injection 10 mg, 1 mL, Intradermal, Once, Lanette Ulloa FNP    LIDOcaine (PF) 10 mg/ml (1%) injection 10 mg, 1 mL, Intradermal, Once, Shannon Milligan MD    ondansetron injection 4 mg, 4 mg, Intravenous, Once, Shannon Milligan MD    prochlorperazine injection Soln 5 mg, 5 mg, Intravenous, Once PRN, Shannon Milligan MD  VTE Risk Mitigation (From admission, onward)           Ordered     IP VTE LOW RISK PATIENT  Once         03/19/24 0422     Place sequential compression device  Until discontinued         03/19/24 0140     Place sequential compression device  Until discontinued         03/18/24 0124                  Assessment:   Cardiac Arrest/VT (Post Operative Left Héctor Orchiectomy- in PACU 3.18.24)     - Requiring Multiple Defibrillations (x 3)    - On Amiodarone Infusion  Chronic Atrial Fibrillation- Now AF SVR (HR 50's with Intermittent PVCS)    - Xarelto on Hold Post Scrotal Abscess I&D  NSTEMI (Unspecified for now)  Hypotension Requiring Vasopressor Support    - History of Hypertension  Valvular Heart Disease    - MR: Moderate, TR: Mild to Moderate  Hyperlipidemia    - On Statin  Sepsis- Likely UTI Related   Scrotal Abscess    - Status Post Surgical Exploration, Left Orchiectomy, & Debridement of Wound/Wound Packing (3.18.24)   COPD  Long Term Oral Anticoagulation  Cardiomyopathy (Unspecified)    - EF 30-40%  CAD (Details Unclear)  PAD  Infrarenal abdominal aortic aneurysmal  dilatation with mural thrombus (Stable)    - maximum diameter is 3.6 cm without significant interval change   Anemia  Acute Human Metapneumovirus Infection (On Droplet Precautions)    Plan:   Continue Amiodarone Infusion Per Protocol Re: VT  Wean Vasopressor Down as Able for Goal MAP 65 or Greater  K+ Goal- 4, Mag Goal- 2  Antibiotic Management as per Primary Team  Vent Management as per ICU Team  Consider Ischemic Evaluation once acute medical issues stabilize, and cleared for anticoagulation.    MALI Li  Cardiology  [facility] - [facility] ICU  03/19/2024

## 2024-03-19 NOTE — PROGRESS NOTES
[facility] - [facility] ICU  Pulmonary Critical Care Note    Patient Name: Ran Reyes Jr.  MRN: [id number]  Admission Date: 3/17/2024  Hospital Length of Stay: 1 days  Code Status: Full Code  Attending Provider: Cassidy Obrien MD  Primary Care Provider: Shiela, Primary Doctor     Subjective:     HPI: Patient is a 68-year-old male with past medical history of nonischemic cardiomyopathy, HFrEF (EF 41%), chronic atrial fibrillation on Xarelto, peripheral arterial disease, COPD, hypertension, large left hydrocele who presented to the emergency department on 03/18/2024 for what he thought to be rectal bleeding.  Patient eventually found that source was from posterior aspect of scrotum.  Patient has had persistent swelling in that region for at least the last couple of weeks.  In the emergency room patient was tachycardic and EKG showed AFib with RVR, blood pressure is were slightly on low side.  Patient did have leukocytosis of 15,000, hemoglobin had showed significant decrease from last check.  Doppler ultrasound could not rule out torsion.  Cultures were drawn and broad-spectrum antibiotics were started.  Urology was consulted and they made decision to proceed with emergent scrotal exploration.  Patient underwent surgery, seemed to tolerate well.  In the PACU, patient was persistently hypotensive with blood pressures staying approximately 80/50 despite fluid resuscitation.  Patient was placed on phenylephrine for vasopressor support and ICU was consulted for admission.      Hospital Course/Significant events:  3/17/24 - seen in ER at [facility] for scrotal pain and bleeding  3/18/24 - D, abscess removal/culture left joselito orchiectomy; subsequent hypootension and code    24 Hour Interval History:  Overnight, noted to have distended abdomen in addition to dilated loops of bowel noted on CXR for ETT confirmation. Subsequent Abdominal XR Flat and erect revealed further further dilatation of bowels, negative for free air  under diaphragm. CTAP w/ IV con revealed evidence of bowel obstruction, more so appearing as partial obstruction over full. Gen Surgery consulted, discussed case, continue current management (bowel rest, NG on suction, serial abdominal exams) for time being.  Concern over triglyceride level yest at 1500.  Discontinued propofol with repeat triglyceride level this morning showing remarkable one day improvement from 1569 to 81. Stopped Fentanyl for sedation (given partial bowel obstruction) and added propofol back for sedation.     Past Medical History:   Diagnosis Date    A-fib     Anticoagulant long-term use     Aortic aneurysm     Cataract     CHF (congestive heart failure)     Chronic atrial fibrillation     COPD (chronic obstructive pulmonary disease)     Coronary artery disease     HLD (hyperlipidemia)     Hypertension     Mitral regurgitation     PAD (peripheral artery disease)     Primary open angle glaucoma (POAG)        Past Surgical History:   Procedure Laterality Date    ATHERECTOMY OF PERIPHERAL VESSEL Left 09/12/2022    LEFT SFA ATHERECTOMY, BALLOON ANGIOPLASTY    CATARACT EXTRACTION W/  INTRAOCULAR LENS IMPLANT Left 3/9/2023    Procedure: EXTRACTION, CATARACT, WITH IOL INSERTION;  Surgeon: Georgi Borja MD;  Location: [facility];  Service: Ophthalmology;  Laterality: Left;  19.5  mac    Heart Stent N/A     > 10yrs. AGO    INSERTION OF STENT INTO PERIPHERAL VESSEL Right 10/17/2022    RIGHT SFA ATHERECTOMY, BALLOON ANGIOPLASTY, STENT; RIGHT ANTERIOR TIBIAL ARTERY ATHERECTOMY, BALLOON ANGIOPLASTY       Social History     Socioeconomic History    Marital status: Single   Tobacco Use    Smoking status: Every Day     Current packs/day: 0.25     Average packs/day: 0.3 packs/day for 40.0 years (10.0 ttl pk-yrs)     Types: Cigarettes     Passive exposure: Current    Smokeless tobacco: Never   Substance and Sexual Activity    Alcohol use: Yes     Alcohol/week: 3.0 standard drinks of alcohol     Types: 3 Cans of  beer per week     Comment: socially    Drug use: Yes     Frequency: 1.0 times per week     Types: Marijuana     Comment: no use in over 2 months     Social Determinants of Health     Financial Resource Strain: Low Risk  (11/30/2022)    Overall Financial Resource Strain (CARDIA)     Difficulty of Paying Living Expenses: Not hard at all   Food Insecurity: No Food Insecurity (11/30/2022)    Hunger Vital Sign     Worried About Running Out of Food in the Last Year: Never true     Ran Out of Food in the Last Year: Never true   Transportation Needs: Unmet Transportation Needs (11/30/2022)    PRAPARE - Transportation     Lack of Transportation (Medical): Yes     Lack of Transportation (Non-Medical): Yes   Physical Activity: Inactive (11/30/2022)    Exercise Vital Sign     Days of Exercise per Week: 0 days     Minutes of Exercise per Session: 0 min   Stress: Stress Concern Present (11/30/2022)    [employer] [place] [employer] - Occupational Stress Questionnaire     Feeling of Stress : Very much   Social Connections: Socially Isolated (11/30/2022)    Social Connection and Isolation Panel [NHANES]     Frequency of Communication with Friends and Family: Never     Frequency of Social Gatherings with Friends and Family: Never     Attends Jew Services: More than 4 times per year     Active Member of Clubs or Organizations: No     Attends Club or Organization Meetings: Never     Marital Status: Never    Housing Stability: Low Risk  (11/30/2022)    Housing Stability Vital Sign     Unable to Pay for Housing in the Last Year: No     Number of Places Lived in the Last Year: 1     Unstable Housing in the Last Year: No           Objective:     Current Outpatient Medications   Medication Instructions    cetirizine (ZYRTEC) 10 mg, Oral, Daily    clopidogreL (PLAVIX) 75 mg, Oral, Daily    empagliflozin (JARDIANCE) 10 mg, Oral, Daily    furosemide (LASIX) 20 mg, Oral, Daily    hydrOXYzine HCL (ATARAX) 50 mg, Oral, 2  times daily PRN    rivaroxaban (XARELTO) 20 mg, Oral    sacubitriL-valsartan (ENTRESTO) 49-51 mg per tablet 1 tablet, Oral, 2 times daily    simvastatin (ZOCOR) 40 mg, Oral, Nightly    spironolactone (ALDACTONE) 25 mg, Oral, Daily    triamcinolone acetonide 0.1% (KENALOG) 0.1 % cream Topical (Top), Daily       Current Inpatient Medications   atorvastatin  80 mg Oral Daily    chlorhexidine  15 mL Mouth/Throat BID    fenofibrate  145 mg Oral Daily    midazolam  2 mg Intravenous Once    midazolam        NORepinephrine        piperacillin-tazobactam (Zosyn) IV (PEDS and ADULTS) (extended infusion is not appropriate)  4.5 g Intravenous Q8H    potassium chloride in water  20 mEq Intravenous Q2H    potassium phosphate IVPB  15 mmol Intravenous Once    vancomycin (VANCOCIN) IV (PEDS and ADULTS)  1,000 mg Intravenous Q12H           Intake/Output Summary (Last 24 hours) at 3/19/2024 0658  Last data filed at 3/19/2024 0452  Gross per 24 hour   Intake 4112.92 ml   Output 800 ml   Net 3312.92 ml       Review of Systems   Unable to perform ROS: Intubated        Vital Signs (Most Recent):  Temp: 98.1 °F (36.7 °C) (03/19/24 0400)  Pulse: 60 (03/19/24 0645)  Resp: 18 (03/19/24 0645)  BP: 121/76 (03/19/24 0645)  SpO2: 98 % (03/19/24 0645)  Body mass index is 27.67 kg/m².  Weight: 90 kg (198 lb 6.6 oz) Vital Signs (24h Range):  Temp:  [97.9 °F (36.6 °C)-98.9 °F (37.2 °C)] 98.1 °F (36.7 °C)  Pulse:  [] 60  Resp:  [12-30] 18  SpO2:  [91 %-100 %] 98 %  BP: ()/(46-98) 121/76     Physical Exam  Vitals and nursing note reviewed.   Constitutional:       Appearance: He is obese.   HENT:      Head: Normocephalic.   Eyes:      General: No scleral icterus.     Extraocular Movements: Extraocular movements intact.      Pupils: Pupils are equal, round, and reactive to light.   Cardiovascular:      Rate and Rhythm: Bradycardia present. Rhythm irregular.      Pulses: Normal pulses.      Heart sounds: Normal heart sounds. No murmur  heard.  Pulmonary:      Effort: No respiratory distress.      Breath sounds: Rales present. No wheezing.   Abdominal:      General: There is distension.      Hernia: No hernia is present.   Musculoskeletal:         General: No swelling or tenderness.      Cervical back: Normal range of motion.      Right lower leg: No edema.      Left lower leg: No edema.   Skin:     General: Skin is warm.      Capillary Refill: Capillary refill takes less than 2 seconds.      Coloration: Skin is not jaundiced or pale.      Findings: No lesion or rash.           Mechanical ventilation support:  Vent Mode: SIMV (03/19/24 0617)  Set Rate: 18 BPM (03/19/24 0617)  Vt Set: 50 mL (03/19/24 0617)  Pressure Support: 10 cmH20 (03/19/24 0617)  PEEP/CPAP: 5 cmH20 (03/19/24 0617)  Oxygen Concentration (%): 30 (03/19/24 0400)  Peak Airway Pressure: 18 cmH20 (03/19/24 0617)  Total Ve: 8.8 L/m (03/19/24 0617)  F/VT Ratio<105 (RSBI): (!) 52.41 (03/19/24 0617)    Lines/Drains/Airways       Central Venous Catheter Line  Duration             Percutaneous Central Line - Triple Lumen  03/19/24 0400 Internal Jugular Right <1 day              Drain  Duration                  Urethral Catheter 03/18/24 1426 Straight-tip 16 Fr. <1 day              Airway  Duration                  Airway - Non-Surgical Endotracheal Tube -- days              Peripheral Intravenous Line  Duration                  Peripheral IV - Single Lumen 03/18/24 1321 20 G Left;Posterior Hand <1 day         Peripheral IV - Single Lumen 03/18/24 1930 20 G Distal;Posterior;Right Forearm <1 day         Peripheral IV - Single Lumen 03/18/24 2300 20 G Distal;Left;Posterior Forearm <1 day         Peripheral IV - Single Lumen 03/19/24 0107 18 G Anterior;Proximal;Right Forearm <1 day                    Significant Labs:    Lab Results   Component Value Date    WBC 9.93 03/19/2024    HGB 8.7 (L) 03/19/2024    HCT 27.5 (L) 03/19/2024    MCV 95.5 (H) 03/19/2024     03/19/2024         BMP  Lab  Results   Component Value Date     03/19/2024    K 4.2 03/19/2024    CO2 25 03/19/2024    BUN 15.3 03/19/2024    CREATININE 0.83 03/19/2024    CALCIUM 8.1 (L) 03/19/2024    EGFRNONAA 88 (L) 12/06/2021       ABG  Recent Labs   Lab 03/19/24  0540   PH 7.440   PO2 96.0   PCO2 45.0   HCO3 30.6*           Significant Imaging:  I have reviewed all pertinent imaging within the past 24 hours.        Assessment/Plan:     Assessment  Scrotal abscess s/p I&D with left joselito orchiectomy 03/18/2024  Recent cardiac arrest w/ associated (03/18/2024)  Sepsis, likely from urinary tract infection req vasopressor support  Chronic atrial fibrillation, on anticoagulation with Xarelto  Partial bowel obstruction  HFrEF (EF 41%)  VHD  Type 2 NSTEMI  History of COPD   History of hypertension   History of hyperlipidemia      Plan  -continue ICU level care and close monitoring  -Consulted Cardiology for cardiac arrest yesterday, which appeared to start as PEA/symptomatic bradycardia followed by vfib followed by monomorphic v tach; continues on amiodarone drip for time being  -added dobutamine 5 mcg/kg/min in addition to Levo    -Per Dr. Johnson (OP Cardiologist) holding BB at this time due HR 50-60s  -Sees Dr. Kulkarni for PAD s/p PCI; no able to access [facility] EMR for recent note, but continued by Dr. Johnson as of 1/30/24  -Persistent afib on Xarelto 20 mg daily  -Will reach out to Urology today for recommendations for restarting anticoagulation/blood-thinner s/p I&D LT joselito orchiectomy 03/18/2020  -general surgery following for partial bowel obstruction; continue bowel rest, NG with suction, and serial abdominal exams  -hypertriglyceridemia roughly 1500 discovered 3/18, propofol stopped at time with amylase/lipase and CT unremarkable for pancreatitis findings; self-resolution (w/o insulin drip) on 3/19 to 81??   -Added propofol back on for sedation    -switch fentanyl drip to Versed drip given concomitant partial bowel obstruction   -progress for  Precedex for sedation given history of alcoholism  -thiamine 500 mg t.i.d. for 3 days; folic acid 1 mg daily  -infectious workup underway  -continues on Zosyn and vancomycin  -continue monitoring electrolytes  -Lactate normalized       DVT Prophylaxis:  SCDs  GI Prophylaxis:  None     Greater than 30 minutes of critical care was time spent personally by me on the following activities: development of treatment plan with patient or surrogate and bedside caregivers, discussions with consultants, evaluation of patient's response to treatment, examination of patient, ordering and performing treatments and interventions, ordering and review of laboratory studies, ordering and review of radiographic studies, pulse oximetry, re-evaluation of patient's condition.  This critical care time did not overlap with that of any other provider or involve time for any procedures.    David Gonsalez MD  Internal Medicine - PGY-2    Pulmonary Critical Care Medicine  [facility] - [address] [place] [address]

## 2024-03-19 NOTE — NURSING
Nurses Note -- 4 Eyes      3/19/2024   2:30 PM      Skin assessed during: Daily Assessment      [] No Altered Skin Integrity Present    [x]Prevention Measures Documented      [x] Yes- Altered Skin Integrity Present or Discovered   [] LDA Added if Not in Epic (Describe Wound)   [] New Altered Skin Integrity was Present on Admit and Documented in LDA   [] Wound Image Taken    Wound Care Consulted? Yes    Attending Nurse:  John Wilson RN     Second RN/Staff Member:  Ayala Vega RN

## 2024-03-19 NOTE — PROCEDURES
Ran Reyes Jr. is a 68 y.o. male patient.    Temp: 98.6 °F (37 °C) (03/19/24 0000)  Pulse: 67 (03/19/24 0419)  Resp: 18 (03/19/24 0419)  BP: 113/67 (03/19/24 0345)  SpO2: 99 % (03/19/24 0419)  Weight: 90 kg (198 lb 6.6 oz) (03/18/24 2202)       Central Line    Date/Time: 3/19/2024 4:39 AM    Performed by: David Gonsalez MD  Authorized by: David Gonsalez MD    Location procedure was performed:  [facility] [place] [facility]  Consent Done ?:  Emergent Situation  Indications:  Med administration, hemodialysis and vascular access  Preparation:  Skin prepped with betadine  Skin prep agent dried: Skin prep agent completely dried prior to procedure    Sterile barriers: All five maximal sterile barriers used - gloves, gown, cap, mask and large sterile sheet    Hand hygiene: Hand hygiene performed immediately prior to central venous catheter insertion    Location:  Right internal jugular  Catheter type:  Triple lumen  Catheter size:  12 Fr  Inserted Catheter Length (cm):  16  Ultrasound guidance: Yes    Vessel Caliber:  Large   not patent  Doppler:  Not done  Needle advanced into vessel with real time ultrasound guidance.    Guidewire confirmed in vessel.    Steril sheath on probe.    Sterile gel used.  Manometry: No    Number of attempts:  1  Securement:  Line sutured, chlorhexidine patch and sterile dressing applied  Complications: No    Estimated blood loss (mL):  5  Specimens: No    Implants: No    XRay:  Placement verified by x-ray, no pneumothorax on x-ray, tip termination and successful placement  Adverse Events:  NoneTermination Site: superior vena cava    David Gonsalez MD  Internal Medicine - PGY-2    3/19/2024

## 2024-03-19 NOTE — CODE DOCUMENTATION
2105: Report given to AM[facility]SA Soto.     2115: Updated patient on plan of care.     2121: V-fib noted on monitor. Pt unresponsive. No pulse palpable. CPR initiated. Code Blue activated. Crash cart at bedside, pads placed. Dr. Lindsey at bedside. Shocked at 200 J. CPR maintained.    2122: 1 mg Atropine and 1 mg Epinephrine administered. shocked at 200 J. CPR maintained.    2123: Shock advised, pt shocked at 200 J. CPR maintained.    2125: ROSC obtained.     2128: etomidate 20 mg succinylcholine 100 mg     2129: Pt intubated, positive color change noted.    2130:  /104    O2% 100%  RR 10    2130: amiodorone and Propofol continuous infusions started    2140: patient taken to ICU on continuous monitoring, on mechanical ventilation. Family updated by Dr. Pradhan.

## 2024-03-19 NOTE — CONSULTS
Acute Care Surgery   History and Physical    Patient Name: Ran Reyes Jr.  YOB: 1955  Date: 03/19/2024 8:36 AM  Date of Admission: 3/17/2024  HD#1  POD#1 Day Post-Op    PRESENTING HISTORY   Chief Complaint/Reason for Admission: Scrotal hematoma    History of Present Illness:  68M PMH afib (xarelto), PAD, COPD, HTN, CHF presented to ED with bleeding and large swollen scrotum.  AFib RVR, hypotension.  Went to OR with urology, left orchiectomy, wound washout done, left open with dressings in place. Pt coded post-op with ROSC. Now in ICU, intubated, sedated.     Pt with some abd distention this AM, XR abd concerning for some colonic distention, surgery consulted for evaluation.  On exam in the ICU, patient is intubated and sedated.  Abdomen is soft, minimally distended.  NG in place with minimal output.  Rectal exam performed, no blood, no significant stool in the vault, no palpable masses.  Scrotal dressing noted, wound is dressed from OR.  Dobbs in place with clear yellow urine.    Review of Systems:  12 point ROS negative except as stated in HPI    PAST HISTORY:   Past medical history:  Past Medical History:   Diagnosis Date    A-fib     Anticoagulant long-term use     Aortic aneurysm     Cataract     CHF (congestive heart failure)     Chronic atrial fibrillation     COPD (chronic obstructive pulmonary disease)     Coronary artery disease     HLD (hyperlipidemia)     Hypertension     Mitral regurgitation     PAD (peripheral artery disease)     Primary open angle glaucoma (POAG)        Past surgical history:  Past Surgical History:   Procedure Laterality Date    ATHERECTOMY OF PERIPHERAL VESSEL Left 09/12/2022    LEFT SFA ATHERECTOMY, BALLOON ANGIOPLASTY    CATARACT EXTRACTION W/  INTRAOCULAR LENS IMPLANT Left 3/9/2023    Procedure: EXTRACTION, CATARACT, WITH IOL INSERTION;  Surgeon: Georgi Borja MD;  Location: [facility];  Service: Ophthalmology;  Laterality: Left;  19.5  mac    Heart Stent  N/A     > 10yrs. AGO    INSERTION OF STENT INTO PERIPHERAL VESSEL Right 10/17/2022    RIGHT SFA ATHERECTOMY, BALLOON ANGIOPLASTY, STENT; RIGHT ANTERIOR TIBIAL ARTERY ATHERECTOMY, BALLOON ANGIOPLASTY       Family history:  Family History   Problem Relation Age of Onset    Cancer Mother     Hypertension Mother     Heart failure Father     Stroke Father     Hypertension Father     No Known Problems Sister        Social history:  Social History     Socioeconomic History    Marital status: Single   Tobacco Use    Smoking status: Every Day     Current packs/day: 0.25     Average packs/day: 0.3 packs/day for 40.0 years (10.0 ttl pk-yrs)     Types: Cigarettes     Passive exposure: Current    Smokeless tobacco: Never   Substance and Sexual Activity    Alcohol use: Yes     Alcohol/week: 3.0 standard drinks of alcohol     Types: 3 Cans of beer per week     Comment: socially    Drug use: Yes     Frequency: 1.0 times per week     Types: Marijuana     Comment: no use in over 2 months     Social Determinants of Health     Financial Resource Strain: Low Risk  (11/30/2022)    Overall Financial Resource Strain (CARDIA)     Difficulty of Paying Living Expenses: Not hard at all   Food Insecurity: No Food Insecurity (11/30/2022)    Hunger Vital Sign     Worried About Running Out of Food in the Last Year: Never true     Ran Out of Food in the Last Year: Never true   Transportation Needs: Unmet Transportation Needs (11/30/2022)    PRAPARE - Transportation     Lack of Transportation (Medical): Yes     Lack of Transportation (Non-Medical): Yes   Physical Activity: Inactive (11/30/2022)    Exercise Vital Sign     Days of Exercise per Week: 0 days     Minutes of Exercise per Session: 0 min   Stress: Stress Concern Present (11/30/2022)    [place] [employer] - Occupational Stress Questionnaire     Feeling of Stress : Very much   Social Connections: Socially Isolated (11/30/2022)    Social Connection and Isolation Panel  [NHANES]     Frequency of Communication with Friends and Family: Never     Frequency of Social Gatherings with Friends and Family: Never     Attends [employer] Services: More than 4 times per year     Active Member of Clubs or Organizations: No     Attends Club or Organization Meetings: Never     Marital Status: Never    Housing Stability: Low Risk  (11/30/2022)    Housing Stability Vital Sign     Unable to Pay for Housing in the Last Year: No     Number of Places Lived in the Last Year: 1     Unstable Housing in the Last Year: No     Social History     Tobacco Use   Smoking Status Every Day    Current packs/day: 0.25    Average packs/day: 0.3 packs/day for 40.0 years (10.0 ttl pk-yrs)    Types: Cigarettes    Passive exposure: Current   Smokeless Tobacco Never      Social History     Substance and Sexual Activity   Alcohol Use Yes    Alcohol/week: 3.0 standard drinks of alcohol    Types: 3 Cans of beer per week    Comment: socially        MEDICATIONS & ALLERGIES:     Current Facility-Administered Medications on File Prior to Encounter   Medication    0.9%  NaCl infusion    diphenhydrAMINE injection 25 mg    HYDROmorphone injection 0.2 mg    HYDROmorphone injection 0.5 mg    LIDOcaine (PF) 10 mg/ml (1%) injection 10 mg    LIDOcaine (PF) 10 mg/ml (1%) injection 10 mg    ondansetron injection 4 mg    prochlorperazine injection Soln 5 mg    [DISCONTINUED] albuterol-ipratropium 2.5 mg-0.5 mg/3 mL nebulizer solution 3 mL    [DISCONTINUED] oxyCODONE-acetaminophen 5-325 mg per tablet 2 tablet     Current Outpatient Medications on File Prior to Encounter   Medication Sig    clopidogreL (PLAVIX) 75 mg tablet Take 1 tablet (75 mg total) by mouth once daily.    empagliflozin (JARDIANCE) 10 mg tablet Take 1 tablet (10 mg total) by mouth once daily.    furosemide (LASIX) 20 MG tablet Take 1 tablet (20 mg total) by mouth once daily.    hydrOXYzine HCL (ATARAX) 10 MG Tab Take 50 mg by mouth 2 (two) times daily as needed.     rivaroxaban (XARELTO) 20 mg Tab Take 20 mg by mouth.    sacubitriL-valsartan (ENTRESTO) 49-51 mg per tablet Take 1 tablet by mouth 2 (two) times daily.    simvastatin (ZOCOR) 40 MG tablet Take 1 tablet (40 mg total) by mouth every evening.    spironolactone (ALDACTONE) 25 MG tablet Take 1 tablet (25 mg total) by mouth once daily.    triamcinolone acetonide 0.1% (KENALOG) 0.1 % cream Apply topically Daily.    cetirizine (ZYRTEC) 10 MG tablet Take 1 tablet (10 mg total) by mouth once daily. for 14 days       Allergies: Review of patient's allergies indicates:  No Known Allergies    Scheduled Meds:   atorvastatin  80 mg Oral Daily    chlorhexidine  15 mL Mouth/Throat BID    folic acid (FOLVITE) IVPB  1 mg Intravenous Once    lactated ringers  1,000 mL Intravenous Once    midazolam  2 mg Intravenous Once    midazolam        NORepinephrine        piperacillin-tazobactam (Zosyn) IV (PEDS and ADULTS) (extended infusion is not appropriate)  4.5 g Intravenous Q8H    thiamine (B-1) 500 mg in dextrose 5 % (D5W) 100 mL IVPB  500 mg Intravenous TID    vancomycin (VANCOCIN) IV (PEDS and ADULTS)  1,000 mg Intravenous Q12H       Continuous Infusions:   amiodarone in dextrose 5% 1 mg/min (03/19/24 0321)    amiodarone in dextrose 5% 0.5 mg/min (03/19/24 0452)    dexmedeTOMIDine (Precedex) infusion (titrating) 0.4 mcg/kg/hr (03/19/24 0452)    dextrose 5 % and 0.45 % NaCl      dextrose 5% lactated ringers      DOBUTamine IV infusion (titrating) 5 mcg/kg/min (03/19/24 7412)    insulin regular 1 units/mL infusion orderable (DKA)      midazolam      NORepinephrine bitartrate-D5W 0.02 mcg/kg/min (03/19/24 0452)    propofoL         PRN Meds:acetaminophen, dextrose 10 % in water (D10W), dextrose 10 % in water (D10W), dextrose 10%, dextrose 10%, dextrose 5 % and 0.45 % NaCl, fentanyl, ipratropium, levalbuterol, melatonin, midazolam, NORepinephrine, oxyCODONE, sodium chloride 0.9%, sodium chloride 0.9%, Pharmacy to dose Vancomycin consult  **AND** vancomycin - pharmacy to dose    OBJECTIVE:   Vital Signs:  VITAL SIGNS: 24 HR MIN & MAX LAST   Temp  Min: 97.9 °F (36.6 °C)  Max: 98.9 °F (37.2 °C)  98.1 °F (36.7 °C)   BP  Min: 71/47  Max: 163/98  127/75    Pulse  Min: 58  Max: 136  63    Resp  Min: 12  Max: 28  18    SpO2  Min: 91 %  Max: 100 %  98 %      HT:    WT: 90 kg (198 lb 6.6 oz)  BMI:       Intake/output:  Intake/Output - Last 3 Shifts         03/17 0700  03/18 0659 03/18 0700 03/19 0659 03/19 0700 03/20 0659    I.V. (mL/kg)  1460.9 (16.2)     IV Piggyback  2652.1     Total Intake(mL/kg)  4112.9 (45.7)     Urine (mL/kg/hr)  1400 (0.6)     Blood  150     Total Output  1550     Net  +2562.9                    Intake/Output Summary (Last 24 hours) at 3/19/2024 0836  Last data filed at 3/19/2024 0621  Gross per 24 hour   Intake 4112.92 ml   Output 1550 ml   Net 2562.92 ml         Physical Exam:  General: intubated and sedated  HEENT: Normocephalic, atraumatic  CV: RR  Resp: no increased WOB  GI:  Abdomen soft, no significant distention  :  Deferred  MSK: No muscle atrophy, cyanosis, peripheral edema, moving all extremities spontaneously  Skin/wounds:  No rashes, ulcers, erythema  Neuro:  CNII-XII grossly intact, alert and oriented to person, place, and time    Labs:  Troponin:  Recent Labs     03/17/24 2103 03/18/24  0132 03/18/24  0533 03/18/24  2226   TROPONINI 0.063* 0.045 0.055* 0.058*     CBC:  Recent Labs     03/17/24 2103 03/17/24  2103 03/18/24  0533 03/18/24  2226 03/19/24  0021 03/19/24  0537   WBC 15.53*   < > 11.97  11.97* 11.77* 9.42  9.42 9.93  9.93   RBC 3.33*  --  3.40* 2.56* 2.82* 2.88*   HGB 10.3*  --  10.5* 8.2* 8.9* 8.7*   HCT 31.8*  --  32.4* 25.1* 26.9* 27.5*     --  136 117* 134 151   MCV 95.5*  --  95.3* 98.0* 95.4* 95.5*   MCH 30.9  --  30.9 32.0* 31.6* 30.2   MCHC 32.4*  --  32.4* 32.7* 33.1 31.6*    < > = values in this interval not displayed.     CMP:  Recent Labs     03/17/24  2103 03/18/24  0533  03/18/24  2226 03/19/24  0020 03/19/24  0537   CALCIUM 7.4* 7.4* 7.4* 7.7* 8.1*   ALBUMIN 2.1* 2.0* 2.3* 2.3* 2.3*    137 133* 129* 137   K 3.5 3.3* 3.3* 3.0* 4.2   CO2 21* 28 18* 18* 25   BUN 16.6 16.3 16.2 18.9 15.3   CREATININE 0.99 0.87 0.94 1.16 0.83   ALKPHOS 76 73 67 75 61   ALT 27 21 16 15 16   * 67* 64* 32 28   BILITOT 1.0 0.8 0.8 0.8 0.5       Urine Drug Screen:  Recent Labs     03/19/24  0128   COCAINE Negative   OPIATE Positive*   FENTANYL Positive*   MDMA Negative      ABG:  Recent Labs   Lab 03/19/24  0540   PH 7.440   PO2 96.0   PCO2 45.0   HCO3 30.6*        Diagnostic Results:  CT Abdomen Pelvis With IV Contrast NO Oral Contrast   Final Result      1. Small pleural effusions with basilar atelectasis.   2. Trace ascites.   3. Postoperative changes of the scrotum with left scrotal wall thickening.         Electronically signed by: Danni Johnson   Date:    03/19/2024   Time:    08:02      X-Ray Chest 1 View   Final Result      No significant change.      Central line insertion         Electronically signed by: Lito Hurst   Date:    03/19/2024   Time:    05:35      XR Gastric tube check, non-radiologist performed   Final Result      Nasogastric tube terminates within the gastric fundus.         Electronically signed by: Mickey West   Date:    03/18/2024   Time:    23:20      XR Gastric tube check, non-radiologist performed   Final Result      Nasogastric tube terminates within the gastric fundus.         Electronically signed by: Mickey West   Date:    03/18/2024   Time:    22:22      X-Ray Chest 1 View   Final Result      Optimal intubation.         Electronically signed by: Mickey West   Date:    03/18/2024   Time:    22:23      US SCROTUM AND TESTICLES WITH DOPPLER (XPD)   Final Result   Abnormal      Limited scan due to significant pain.      Pronounce edematous thickening of the superficial soft tissues measure approximately 1.7 cm.      Inability to  the right and  left testicles with a single hypoechoic structure in the midline with measurements as above and not appreciable blood flow exact nature of these abnormality cannot be completely ascertained by this exam changes suggestive of testicular torsion are not completely excluded clinical correlation is suggested.      This report was flagged in Epic as abnormal.         Electronically signed by: Lito Hurst   Date:    03/18/2024   Time:    06:44      X-Ray Chest 1 View   Final Result      Cardiomegaly.      Minimal increase interstitial markings         Electronically signed by: Lito Hurst   Date:    03/18/2024   Time:    05:18      CTA Abdomen and Pelvis   Final Result      1. Stable infrarenal abdominal aortic aneurysmal dilatation with mural thrombus.  No contrast extravasation from major abdominopelvic vasculature..      2. No active gastrointestinal hemorrhage identified.      3. Large amount of hyperdense hemorrhagic fluid within the scrotum without active bleeding.  The source of this hemorrhage is not determined on this exam.  This may be secondary to scrotal vasculature.  Please further assess with ultrasound exam with Doppler.         Electronically signed by: Mickey West   Date:    03/17/2024   Time:    21:44      X-Ray Abdomen Flat And Erect    (Results Pending)       ASSESSMENT & PLAN:    68M PMH afib (xarelto), PAD, COPD, HTN, CHF presented to ED with bleeding and large swollen scrotum.  AFib RVR, hypotension.  Went to OR with urology now s/p left orchiectomy, wound washout. Pt coded post-op with ROSC. Now in ICU, intubated, sedated. Concern for abd distention/toxic megacolon - abd soft on my exam, CT images reviewed. Possible ileus given critical illness, no signs of mechanical obstruction noted. WBC 9. LA 1.3 today from 3.7 yest at 2226.    -no indication for acute surgical intervention at this time  -patient remains intubated and sedated, with pressor requirement.  Agree with NG to low  intermittent wall suction    Aurora Hammond MD  [employer] Surgery PGY4  03/19/2024  8:36 AM

## 2024-03-19 NOTE — NURSING
Nurses Note -- 4 Eyes      3/19/2024   5:03 AM      Skin assessed during: Admit      [] No Altered Skin Integrity Present    [x]Prevention Measures Documented      [x] Yes- Altered Skin Integrity Present or Discovered   [] LDA Added if Not in Epic (Describe Wound)   [] New Altered Skin Integrity was Present on Admit and Documented in LDA   [] Wound Image Taken    Wound Care Consulted? No    Attending Nurse:  Brittany Hollis RN/Staff Member:   Silvestre Garcia RN

## 2024-03-19 NOTE — H&P
[facility] - Periop Services  Pulmonary Critical Care Note    Patient Name: Ran Reyes Jr.  MRN: [id number]  Admission Date: 3/17/2024  Hospital Length of Stay: 0 days  Code Status: Full Code  Attending Provider: Harris Hernandez MD  Primary Care Provider: Shiela, Primary Doctor     Subjective:     HPI:   Patient is a 68-year-old male with past medical history of nonischemic cardiomyopathy, HFrEF (EF 41%), chronic atrial fibrillation on Xarelto, peripheral arterial disease, COPD, hypertension, large left hydrocele who presented to the emergency department on 03/18/2024 for what he thought to be rectal bleeding.  Patient eventually found that source was from posterior aspect of scrotum.  Patient has had persistent swelling in that region for at least the last couple of weeks.  In the emergency room patient was tachycardic and EKG showed AFib with RVR, blood pressure is were slightly on low side.  Patient did have leukocytosis of 15,000, hemoglobin had showed significant decrease from last check.  Doppler ultrasound could not rule out torsion.  Cultures were drawn and broad-spectrum antibiotics were started.  Urology was consulted and they made decision to proceed with emergent scrotal exploration.  Patient underwent surgery, seemed to tolerate well.  In the PACU, patient was persistently hypotensive with blood pressures staying approximately 80/50 despite fluid resuscitation.  Patient was placed on phenylephrine for vasopressor support and ICU was consulted for admission    Past Medical History:   Diagnosis Date    A-fib     Anticoagulant long-term use     Aortic aneurysm     Cataract     CHF (congestive heart failure)     Chronic atrial fibrillation     COPD (chronic obstructive pulmonary disease)     Coronary artery disease     HLD (hyperlipidemia)     Hypertension     Mitral regurgitation     PAD (peripheral artery disease)     Primary open angle glaucoma (POAG)      Past Surgical History:    Procedure Laterality Date    ATHERECTOMY OF PERIPHERAL VESSEL Left 09/12/2022    LEFT SFA ATHERECTOMY, BALLOON ANGIOPLASTY    CATARACT EXTRACTION W/  INTRAOCULAR LENS IMPLANT Left 3/9/2023    Procedure: EXTRACTION, CATARACT, WITH IOL INSERTION;  Surgeon: Georgi Borja MD;  Location: [facility];  Service: Ophthalmology;  Laterality: Left;  19.5  mac    Heart Stent N/A     > 10yrs. AGO    INSERTION OF STENT INTO PERIPHERAL VESSEL Right 10/17/2022    RIGHT SFA ATHERECTOMY, BALLOON ANGIOPLASTY, STENT; RIGHT ANTERIOR TIBIAL ARTERY ATHERECTOMY, BALLOON ANGIOPLASTY     Social History     Socioeconomic History    Marital status: Single   Tobacco Use    Smoking status: Every Day     Current packs/day: 0.25     Average packs/day: 0.3 packs/day for 40.0 years (10.0 ttl pk-yrs)     Types: Cigarettes     Passive exposure: Current    Smokeless tobacco: Never   Substance and Sexual Activity    Alcohol use: Yes     Alcohol/week: 3.0 standard drinks of alcohol     Types: 3 Cans of beer per week     Comment: socially    Drug use: Yes     Frequency: 1.0 times per week     Types: Marijuana     Comment: no use in over 2 months     Social Determinants of Health     Financial Resource Strain: Low Risk  (11/30/2022)    Overall Financial Resource Strain (CARDIA)     Difficulty of Paying Living Expenses: Not hard at all   Food Insecurity: No Food Insecurity (11/30/2022)    Hunger Vital Sign     Worried About Running Out of Food in the Last Year: Never true     Ran Out of Food in the Last Year: Never true   Transportation Needs: Unmet Transportation Needs (11/30/2022)    PRAPARE - Transportation     Lack of Transportation (Medical): Yes     Lack of Transportation (Non-Medical): Yes   Physical Activity: Inactive (11/30/2022)    Exercise Vital Sign     Days of Exercise per Week: 0 days     Minutes of Exercise per Session: 0 min   Stress: Stress Concern Present (11/30/2022)    [employer] - Occupational Stress  Questionnaire     Feeling of Stress : Very much   Social Connections: Socially Isolated (11/30/2022)    Social Connection and Isolation Panel [NHANES]     Frequency of Communication with Friends and Family: Never     Frequency of Social Gatherings with Friends and Family: Never     Attends Taoism Services: More than 4 times per year     Active Member of Clubs or Organizations: No     Attends Club or Organization Meetings: Never     Marital Status: Never    Housing Stability: Low Risk  (11/30/2022)    Housing Stability Vital Sign     Unable to Pay for Housing in the Last Year: No     Number of Places Lived in the Last Year: 1     Unstable Housing in the Last Year: No     Current Outpatient Medications   Medication Instructions    cetirizine (ZYRTEC) 10 mg, Oral, Daily    clopidogreL (PLAVIX) 75 mg, Oral, Daily    empagliflozin (JARDIANCE) 10 mg, Oral, Daily    furosemide (LASIX) 20 mg, Oral, Daily    hydrOXYzine HCL (ATARAX) 50 mg, Oral, 2 times daily PRN    rivaroxaban (XARELTO) 20 mg, Oral    sacubitriL-valsartan (ENTRESTO) 49-51 mg per tablet 1 tablet, Oral, 2 times daily    simvastatin (ZOCOR) 40 mg, Oral, Nightly    spironolactone (ALDACTONE) 25 mg, Oral, Daily    triamcinolone acetonide 0.1% (KENALOG) 0.1 % cream Topical (Top), Daily   Current Inpatient Medications   atorvastatin  20 mg Oral Daily    enoxparin  40 mg Subcutaneous Daily    NORepinephrine        PHENYLephrine        piperacillin-tazobactam (Zosyn) IV (PEDS and ADULTS) (extended infusion is not appropriate)  4.5 g Intravenous Q8H    vancomycin (VANCOCIN) IV (PEDS and ADULTS)  1,000 mg Intravenous Q12H   Current Intravenous Infusions   NORepinephrine bitartrate-D5W       Review of Systems   Constitutional:  Negative for chills and fever.   Respiratory:  Negative for cough and shortness of breath.    Cardiovascular:  Negative for chest pain and leg swelling.   Gastrointestinal:  Negative for abdominal pain.   Genitourinary:  Positive for  dysuria.   Musculoskeletal:  Negative for myalgias.   Neurological:  Negative for weakness and headaches.        Objective:       Intake/Output Summary (Last 24 hours) at 3/18/2024 2039  Last data filed at 3/18/2024 1715  Gross per 24 hour   Intake 1922.83 ml   Output 600 ml   Net 1322.83 ml     Vital Signs (Most Recent):  Temp: 98.9 °F (37.2 °C) (03/18/24 1930)  Pulse: 81 (03/18/24 2020)  Resp: 19 (03/18/24 2020)  BP: (!) 102/55 (03/18/24 2020)  SpO2: (!) 94 % (03/18/24 2020)  Body mass index is 23.71 kg/m².  Weight: 77.1 kg (170 lb) Vital Signs (24h Range):  Temp:  [97.9 °F (36.6 °C)-98.9 °F (37.2 °C)] 98.9 °F (37.2 °C)  Pulse:  [] 81  Resp:  [14-30] 19  SpO2:  [92 %-100 %] 94 %  BP: ()/(46-82) 102/55     Physical Exam  Constitutional:       Appearance: Normal appearance.   HENT:      Head: Normocephalic and atraumatic.   Cardiovascular:      Rate and Rhythm: Normal rate. Rhythm irregular.      Pulses: Normal pulses.      Heart sounds: Murmur heard.   Pulmonary:      Effort: Pulmonary effort is normal. No respiratory distress.      Breath sounds: Rales present. No wheezing.   Abdominal:      Palpations: Abdomen is soft.      Tenderness: There is no abdominal tenderness.   Genitourinary:     Rectum: Guaiac result negative.      Comments: Post surgical  Musculoskeletal:      Right lower leg: No edema.      Left lower leg: No edema.   Skin:     Capillary Refill: Capillary refill takes less than 2 seconds.   Neurological:      General: No focal deficit present.      Mental Status: He is alert and oriented to person, place, and time.         Lines/Drains/Airways       Drain  Duration                  Urethral Catheter 03/18/24 1426 Straight-tip 16 Fr. <1 day              Peripheral Intravenous Line  Duration                  Peripheral IV - Single Lumen 03/18/24 1321 20 G Left;Posterior Wrist <1 day                Significant Labs:  Lab Results   Component Value Date    WBC 11.97 (H) 03/18/2024    WBC 11.97  03/18/2024    HGB 10.5 (L) 03/18/2024    HCT 32.4 (L) 03/18/2024    MCV 95.3 (H) 03/18/2024     03/18/2024   BMP  Lab Results   Component Value Date     03/18/2024    K 3.3 (L) 03/18/2024    CO2 28 03/18/2024    BUN 16.3 03/18/2024    CREATININE 0.87 03/18/2024    CALCIUM 7.4 (L) 03/18/2024    EGFRNONAA 88 (L) 12/06/2021   Significant Imaging:  I have reviewed the pertinent imaging within the past 24 hours.    Assessment/Plan:     Assessment  Scrotal swelling, concern for torsion s/p surgical exploration and orchiectomy  Sepsis, likely from urinary tract infection   Persistent hypotension requiring vasopressor support   Chronic atrial fibrillation, rate controlled.  On Xarelto at home   HFrEF (EF 41%)   Type 2 NSTEMI  History of COPD   History of hypertension   History of hyperlipidemia    Plan  -will admit to ICU for close monitoring   -patient received approximately 1.5 L of fluid resuscitation, holding off on full sepsis protocol in setting of reduced ejection fraction   -continue vasopressor support as needed to support blood pressure, will wean as able   -cultures remain pending, continuing broad-spectrum antibiotics   -cardiology following, recommended restarting beta-blocker but will hold off on all antihypertensives until blood pressure better controlled   -will hold off on Xarelto tonight, can restart in morning if hemoglobin stable       33 minutes of critical care was time spent personally by me on the following activities: development of treatment plan with patient or surrogate and bedside caregivers, discussions with consultants, evaluation of patient's response to treatment, examination of patient, ordering and performing treatments and interventions, ordering and review of laboratory studies, ordering and review of radiographic studies, pulse oximetry, re-evaluation of patient's condition.  This critical care time did not overlap with that of any other provider or involve time for any  procedures.     Narendra Pradhan DO  Pulmonary Critical Care Medicine  [facility] - Periop Services

## 2024-03-19 NOTE — PROGRESS NOTES
Inpatient Nutrition Assessment    Admit Date: 3/17/2024   Total duration of encounter: 2 days   Patient Age: 68 y.o.    Nutrition Recommendation/Prescription     Start tube feeding when appropriate.  Tube feeding recommendation:     Peptamen AF goal rate 80 ml/hr to provide  1920 kcal/d (97% est needs)  120 g protein/d (89% est needs)  1296 ml free water/d (58% est needs)  (calculations based on estimated 20 hr/d run time)     If not able to starte TF by LOS day 7, will need to consider PN. RD to to provide recs if needed.    Communication of Recommendations: reviewed with nurse    Nutrition Assessment     Malnutrition Assessment/Nutrition-Focused Physical Exam    Malnutrition Context: acute illness or injury (03/19/24 1033)  Malnutrition Level:  (does not meet criteria) (03/19/24 1033)  Energy Intake (Malnutrition):  (unable to eval) (03/19/24 1033)  Weight Loss (Malnutrition):  (unable to eval) (03/19/24 1033)  Subcutaneous Fat (Malnutrition):  (does not meet criteria) (03/19/24 1033)           Muscle Mass (Malnutrition):  (does not meet criteria) (03/19/24 1033)                          Fluid Accumulation (Malnutrition):  (does not meet criteria) (03/19/24 1033)        A minimum of two characteristics is recommended for diagnosis of either severe or non-severe malnutrition.    Chart Review    Reason Seen: continuous nutrition monitoring    Malnutrition Screening Tool Results   Have you recently lost weight without trying?: No  Have you been eating poorly because of a decreased appetite?: No   MST Score: 0   Diagnosis:  Scrotal abscess s/p I&D with left joselito orchiectomy 03/18/2024  Recent cardiac arrest  Sepsis  Chronic atrial fibrillation  Partial bowel obstruction  HFrEF     Relevant Medical History: COPD, HTN, HLD    Scheduled Medications:  atorvastatin, 80 mg, Daily  chlorhexidine, 15 mL, BID  midazolam, 2 mg, Once  midazolam, ,   piperacillin-tazobactam (Zosyn) IV (PEDS and ADULTS) (extended infusion is not  appropriate), 4.5 g, Q8H  thiamine (B-1) 500 mg in dextrose 5 % (D5W) 100 mL IVPB, 500 mg, TID  vancomycin (VANCOCIN) IV (PEDS and ADULTS), 1,000 mg, Q12H    Continuous Infusions:  amiodarone in dextrose 5%, Last Rate: 1 mg/min (03/19/24 0321)  amiodarone in dextrose 5%, Last Rate: 0.5 mg/min (03/19/24 0700)  dexmedeTOMIDine (Precedex) infusion (titrating), Last Rate: 0.4 mcg/kg/hr (03/19/24 0700)  dextrose 5 % and 0.45 % NaCl  dextrose 5% lactated ringers  DOBUTamine IV infusion (titrating), Last Rate: 5 mcg/kg/min (03/19/24 0752)  insulin regular 1 units/mL infusion orderable (DKA)  midazolam, Last Rate: 1 mg/hr (03/19/24 0830)  NORepinephrine bitartrate-D5W, Last Rate: 0.02 mcg/kg/min (03/19/24 0700)  propofoL, Last Rate: Stopped (03/19/24 0715)    PRN Medications: acetaminophen, dextrose 10 % in water (D10W), dextrose 10 % in water (D10W), dextrose 10%, dextrose 10%, dextrose 5 % and 0.45 % NaCl, fentanyl, ipratropium, levalbuterol, melatonin, midazolam, oxyCODONE, sodium chloride 0.9%, sodium chloride 0.9%, Pharmacy to dose Vancomycin consult **AND** vancomycin - pharmacy to dose    Calorie Containing IV Medications: no significant kcals from medications at this time    Recent Labs   Lab 03/17/24  2103 03/18/24  0533 03/18/24  2226 03/19/24  0020 03/19/24  0021 03/19/24  0537    137 133* 129*  --  137   K 3.5 3.3* 3.3* 3.0*  --  4.2   CALCIUM 7.4* 7.4* 7.4* 7.7*  --  8.1*   PHOS  --   --  3.2 3.2  --  3.3   MG 2.20  --  2.20 2.30  --  2.40   CHLORIDE 101 99 99 95*  --  101   CO2 21* 28 18* 18*  --  25   BUN 16.6 16.3 16.2 18.9  --  15.3   CREATININE 0.99 0.87 0.94 1.16  --  0.83   EGFRNORACEVR >60 >60 >60 >60  --  >60   GLUCOSE 99 99 159* 515*  --  217*   BILITOT 1.0 0.8 0.8 0.8  --  0.5   ALKPHOS 76 73 67 75  --  61   ALT 27 21 16 15  --  16   * 67* 64* 32  --  28   ALBUMIN 2.1* 2.0* 2.3* 2.3*  --  2.3*   TRIG  --   --  1,569*  --   --  81   HGBA1C  --   --  5.0  --   --   --    LIPASE  --   --    "--  5  --   --    AMYLASE  --   --   --  35  --   --    WBC 15.53* 11.97  11.97* 11.77*  --  9.42  9.42 9.93  9.93   HGB 10.3* 10.5* 8.2*  --  8.9* 8.7*   HCT 31.8* 32.4* 25.1*  --  26.9* 27.5*     Nutrition Orders:  Diet NPO      Appetite/Oral Intake: not applicable/not applicable  Factors Affecting Nutritional Intake: on mechanical ventilation  Food/Rastafarian/Cultural Preferences: unable to obtain  Food Allergies: none reported  Last Bowel Movement: 03/17/24  Wound(s):  incision noted    Comments    3/19/24: Discussed with RN. Will provide tube feeding recommendations for when appropriate to start. No kcal from meds at this time. Noted partial bowel obstruction. Will monitor for need for PN.     Anthropometrics    Height: 5' 11" (180.3 cm),    Last Weight: 90 kg (198 lb 6.6 oz) (03/18/24 2202), Weight Method: Estimated     BMI Classification: overweight (BMI 25-29.9)        Ideal Body Weight (IBW), Male: 172 lb                                Usual Weight Provided By: unable to obtain usual weight    Wt Readings from Last 5 Encounters:   03/18/24 90 kg (198 lb 6.6 oz)   01/31/24 92.1 kg (203 lb)   01/04/24 92.4 kg (203 lb 11.3 oz)   11/15/23 90.7 kg (200 lb)   09/20/23 91.2 kg (201 lb)     Weight Change(s) Since Admission:   Wt Readings from Last 1 Encounters:   03/18/24 2202 90 kg (198 lb 6.6 oz)   03/17/24 2036 77.1 kg (170 lb)   Admit Weight: 77.1 kg (170 lb) (03/17/24 2036), Weight Method: Estimated    Estimated Needs    Weight Used For Calorie Calculations: 90 kg (198 lb 6.6 oz)  Energy Calorie Requirements (kcal): 1988kcal  Energy Need Method: [facility]  Weight Used For Protein Calculations: 90 kg (198 lb 6.6 oz)  Protein Requirements: 135gm (1.5g/kg)  Fluid Requirements (mL): 2250ml (25ml/kg)    Enteral Nutrition     Patient not receiving enteral nutrition at this time.    Parenteral Nutrition     Patient not receiving parenteral nutrition support at this time.    Evaluation of Received Nutrient " Intake    Calories: not meeting estimated needs  Protein: not meeting estimated needs    Patient Education     Not applicable.    Nutrition Diagnosis     PES: Inadequate oral intake related to acute illness as evidenced by intubation since admit. (new)     Nutrition Interventions     Intervention(s): modified composition of enteral nutrition, modified rate of enteral nutrition, modified composition of parenteral nutrition, modified rate of parenteral nutrition, and collaboration with other providers    Goal: Meet greater than 80% of nutritional needs by follow-up. (new)  Goal: Tolerate enteral feeding at goal rate by follow-up. (new)    Nutrition Goals & Monitoring     Dietitian will monitor: energy intake    Nutrition Risk/Follow-Up: high (follow-up in 1-4 days)   Please consult if re-assessment needed sooner.

## 2024-03-19 NOTE — PROGRESS NOTES
Pharmacokinetic Assessment Follow Up: IV Vancomycin    Vancomycin serum concentration assessment(s):  The trough level was drawn correctly and can be used to guide therapy at this time. The measurement is below the desired definitive target range of 15 to 20 mcg/mL.    Vancomycin Regimen Plan:  Change regimen to Vancomycin 1250 mg IV every 12 hours with next serum trough concentration measured at 1000 prior to 5th dose on 03/21      Drug levels (last 3 results):  Recent Labs   Lab Result Units 03/19/24  1029   Vancomycin Trough ug/ml 10.0*       Vancomycin Administrations:  vancomycin given in the last 96 hours                     vancomycin in dextrose 5 % 1 gram/250 mL IVPB 1,000 mg (mg) 1,000 mg New Bag 03/19/24 1051     1,000 mg New Bag 03/18/24 2319     1,000 mg New Bag  1126    vancomycin 1.5 g in dextrose 5 % 250 mL IVPB (ready to mix) ()  Restarted 03/17/24 2358     1,500 mg New Bag  2328                    Pharmacy will continue to follow and monitor vancomycin.    Please contact pharmacy at extension [phone] for questions regarding this assessment.    Thank you for the consult,   Marii Vega       Patient brief summary:  Ran Reyes Jr. is a 68 y.o. male initiated on antimicrobial therapy with IV Vancomycin for treatment of sepsis    The patient's current regimen is 1000 mg IVPB Q12H (changing to 1250 mg IVPB Q12H)    Drug Allergies:   Review of patient's allergies indicates:  No Known Allergies    Actual Body Weight:  Wt Readings from Last 1 Encounters:   03/18/24 90 kg (198 lb 6.6 oz)       Renal Function:   Estimated Creatinine Clearance: 93.2 mL/min (based on SCr of 0.83 mg/dL).,     Dialysis Method (if applicable):  N/A    CBC (last 72 hours):  Recent Labs   Lab Result Units 03/17/24  2103 03/18/24  0533 03/18/24  2226 03/19/24  0021 03/19/24  0537   WBC x10(3)/mcL 15.53* 11.97  11.97* 11.77* 9.42  9.42 9.93  9.93   Hgb g/dL 10.3* 10.5* 8.2* 8.9* 8.7*   Hemoglobin A1c %  --   --  5.0  --   --     Hct % 31.8* 32.4* 25.1* 26.9* 27.5*   Platelet x10(3)/mcL 141 136 117* 134 151   Mono % %  --   --  8.2  --   --    Monocytes % % 4 1  --  4 6   Eos % %  --   --  0.0  --   --    Basophil % %  --   --  0.6  --   --        Metabolic Panel (last 72 hours):  Recent Labs   Lab Result Units 03/17/24 2103 03/18/24  0034 03/18/24  0533 03/18/24  2226 03/19/24  0020 03/19/24  0128 03/19/24  0537   Sodium Level mmol/L 137  --  137 133* 129*  --  137   Potassium Level mmol/L 3.5  --  3.3* 3.3* 3.0*  --  4.2   Chloride mmol/L 101  --  99 99 95*  --  101   Carbon Dioxide mmol/L 21*  --  28 18* 18*  --  25   Glucose Level mg/dL 99  --  99 159* 515*  --  217*   Glucose, UA   --  1+*  --   --   --  3+*  --    Blood Urea Nitrogen mg/dL 16.6  --  16.3 16.2 18.9  --  15.3   Creatinine mg/dL 0.99  --  0.87 0.94 1.16  --  0.83   Albumin Level g/dL 2.1*  --  2.0* 2.3* 2.3*  --  2.3*   Bilirubin Total mg/dL 1.0  --  0.8 0.8 0.8  --  0.5   Alkaline Phosphatase unit/L 76  --  73 67 75  --  61   Aspartate Aminotransferase unit/L 112*  --  67* 64* 32  --  28   Alanine Aminotransferase unit/L 27  --  21 16 15  --  16   Magnesium Level mg/dL 2.20  --   --  2.20 2.30  --  2.40   Phosphorus Level mg/dL  --   --   --  3.2 3.2  --  3.3       Microbiologic Results:  Microbiology Results (last 7 days)       Procedure Component Value Units Date/Time    Urine culture [[id number]]  (Abnormal) Collected: 03/18/24 0034    Order Status: Completed Specimen: Urine Updated: 03/19/24 1004     Urine Culture No other significant growth      10,000 - 25,000 colonies/ml Yeast species     Comment: We have not further evaluated these organisms because three or more species compatible with normal genital jt usually represents specimen contamination from the time of collection, rather than infection. If clinical circumstances warrant further   workup of these organisms, please contact the Microbiology dept at [phone]; otherwise please have the patient submit  another specimen.       Wound Culture [[id number]] Collected: 03/18/24 1502    Order Status: Completed Specimen: Abscess from Scrotum Updated: 03/19/24 0633     Wound Culture No Growth At 24 Hours    Chlamydia/GC, PCR [[id number]] Collected: 03/19/24 0128    Order Status: Completed Specimen: Urine Updated: 03/19/24 0527     Chlamydia trachomatis PCR Not Detected     N. gonorrhea PCR Not Detected     Source Urine    Narrative:      The Xpert CT/NG test, performed on the GeneXpert system is a qualitative in vitro real-time polymerase chain reaction (PCR) test for the automated detected and differentiation for genomic DNA from Chlamydia trachomatis (CT) and/or Neisseria gonorrhoeae (NG).    STD Urine (CT/GC/Trich) [[id number]] Collected: 03/19/24 0128    Order Status: Canceled Specimen: Urine Updated: 03/19/24 0138    Respiratory Culture [[id number]]     Order Status: Sent Specimen: Sputum     Blood Culture [[id number]] Collected: 03/18/24 2226    Order Status: Resulted Specimen: Blood, Venous Updated: 03/18/24 2232    Blood culture #2 **CANNOT BE ORDERED STAT** [[id number]]  (Normal) Collected: 03/17/24 2103    Order Status: Completed Specimen: Blood Updated: 03/18/24 2201     CULTURE, BLOOD ([facility]) No Growth At 24 Hours    Blood culture #1 **CANNOT BE ORDERED STAT** [[id number]]  (Normal) Collected: 03/17/24 2103    Order Status: Completed Specimen: Blood Updated: 03/18/24 2201     CULTURE, BLOOD ([facility]) No Growth At 24 Hours    Anaerobic Culture [[id number]] Collected: 03/18/24 1502    Order Status: Sent Specimen: Abscess from Scrotum Updated: 03/18/24 1611

## 2024-03-19 NOTE — PROGRESS NOTES
Pt was boarding in PACU awaiting bed in ICU.  Noted by PACU staff to be in Vfib all of a sudden.  Defib connected and pt shocked with quick return to sabrina rhythm.  Pt woke up gesturing, but another Vfib episode ensued and again pt responded to shock.  Pt intubated after giving Etomidate 16mg & Succ 100mg, with Mac 3 blade & #8 ETT.  ROSC obtained and pt rapidly transported to ICU.  ICU staff in PACU given report and further care handed off to them.   SW attempted to complete assessment. Patient confused and struggled to engage in conversation. SW discussed with nurse and discharge planner to assess needs and coordinate services. No additional services needed at this time.           Electronically signed by YOLANDA Westfall on 4/17/2023 at 1:19 PM

## 2024-03-20 LAB
ABS NEUT (OLG): 9.46 X10(3)/MCL (ref 2.1–9.2)
ALBUMIN SERPL-MCNC: 2 G/DL (ref 3.4–4.8)
ALBUMIN/GLOB SERPL: 0.7 RATIO (ref 1.1–2)
ALLENS TEST BLOOD GAS (OHS): YES
ALLENS TEST BLOOD GAS (OHS): YES
ALP SERPL-CCNC: 55 UNIT/L (ref 40–150)
ALT SERPL-CCNC: 12 UNIT/L (ref 0–55)
ANISOCYTOSIS BLD QL SMEAR: ABNORMAL
AST SERPL-CCNC: 17 UNIT/L (ref 5–34)
BASE EXCESS BLD CALC-SCNC: 10.6 MMOL/L
BASE EXCESS BLD CALC-SCNC: 8.8 MMOL/L
BILIRUB SERPL-MCNC: 0.4 MG/DL
BLOOD GAS SAMPLE TYPE (OHS): ABNORMAL
BLOOD GAS SAMPLE TYPE (OHS): ABNORMAL
BUN SERPL-MCNC: 15.2 MG/DL (ref 8.4–25.7)
BURR CELLS (OLG): ABNORMAL
CA-I BLD-SCNC: 1.05 MMOL/L (ref 1.12–1.23)
CA-I BLD-SCNC: 1.08 MMOL/L (ref 1.12–1.23)
CALCIUM SERPL-MCNC: 7.4 MG/DL (ref 8.8–10)
CHLORIDE SERPL-SCNC: 101 MMOL/L (ref 98–107)
CO2 BLDA-SCNC: 34 MMOL/L
CO2 BLDA-SCNC: 36.5 MMOL/L
CO2 SERPL-SCNC: 25 MMOL/L (ref 23–31)
CREAT SERPL-MCNC: 0.84 MG/DL (ref 0.73–1.18)
DRAWN BY BLOOD GAS (OHS): ABNORMAL
DRAWN BY BLOOD GAS (OHS): ABNORMAL
ERYTHROCYTE [DISTWIDTH] IN BLOOD BY AUTOMATED COUNT: 16.3 % (ref 11.5–17)
GFR SERPLBLD CREATININE-BSD FMLA CKD-EPI: >60 MLS/MIN/1.73/M2
GLOBULIN SER-MCNC: 2.8 GM/DL (ref 2.4–3.5)
GLUCOSE SERPL-MCNC: 127 MG/DL (ref 82–115)
HCO3 BLDA-SCNC: 32.7 MMOL/L (ref 22–26)
HCO3 BLDA-SCNC: 35.1 MMOL/L (ref 22–26)
HCT VFR BLD AUTO: 25.7 % (ref 42–52)
HGB BLD-MCNC: 8.5 G/DL (ref 14–18)
INHALED O2 CONCENTRATION: 30 %
INHALED O2 CONCENTRATION: 44 %
INSTRUMENT WBC (OLG): 12.28 X10(3)/MCL
LACTATE SERPL-SCNC: 1.8 MMOL/L (ref 0.5–2.2)
LPM (OHS): 6
LYMPHOCYTES NFR BLD MANUAL: 1.6 X10(3)/MCL
LYMPHOCYTES NFR BLD MANUAL: 13 %
MACROCYTES BLD QL SMEAR: ABNORMAL
MAGNESIUM SERPL-MCNC: 2.3 MG/DL (ref 1.6–2.6)
MCH RBC QN AUTO: 31.3 PG (ref 27–31)
MCHC RBC AUTO-ENTMCNC: 33.1 G/DL (ref 33–36)
MCV RBC AUTO: 94.5 FL (ref 80–94)
MECH RR (OHS): 18 B/MIN
MODE (OHS): ABNORMAL
MONOCYTES NFR BLD MANUAL: 0.98 X10(3)/MCL (ref 0.1–1.3)
MONOCYTES NFR BLD MANUAL: 8 %
NEUTROPHILS NFR BLD MANUAL: 77 %
NRBC BLD AUTO-RTO: 0 %
NRBC BLD MANUAL-RTO: 1 %
OXYGEN DEVICE BLOOD GAS (OHS): ABNORMAL
OXYGEN DEVICE BLOOD GAS (OHS): ABNORMAL
PATH REV: NORMAL
PCO2 BLDA: 41 MMHG (ref 35–45)
PCO2 BLDA: 45 MMHG (ref 35–45)
PEEP RESPIRATORY: 5 CMH2O
PH BLDA: 7.5 [PH] (ref 7.35–7.45)
PH BLDA: 7.51 [PH] (ref 7.35–7.45)
PHOSPHATE SERPL-MCNC: 2.8 MG/DL (ref 2.3–4.7)
PLASMA CELLS BLD QL SMEAR: 1 %
PLATELET # BLD AUTO: 154 X10(3)/MCL (ref 130–400)
PLATELET # BLD EST: NORMAL 10*3/UL
PMV BLD AUTO: 11.4 FL (ref 7.4–10.4)
PO2 BLDA: 52 MMHG (ref 80–100)
PO2 BLDA: 73 MMHG (ref 80–100)
POCT GLUCOSE: 154 MG/DL (ref 70–110)
POIKILOCYTOSIS BLD QL SMEAR: ABNORMAL
POTASSIUM BLOOD GAS (OHS): 3.1 MMOL/L (ref 3.5–5)
POTASSIUM BLOOD GAS (OHS): 3.5 MMOL/L (ref 3.5–5)
POTASSIUM SERPL-SCNC: 3.7 MMOL/L (ref 3.5–5.1)
PROMYELOCYTES # BLD MANUAL: 1 %
PROT SERPL-MCNC: 4.8 GM/DL (ref 5.8–7.6)
PS (OHS): 10 CMH2O
PSYCHE PATHOLOGY RESULT: NORMAL
RBC # BLD AUTO: 2.72 X10(6)/MCL (ref 4.7–6.1)
RBC MORPH BLD: ABNORMAL
SAMPLE SITE BLOOD GAS (OHS): ABNORMAL
SAMPLE SITE BLOOD GAS (OHS): ABNORMAL
SAO2 % BLDA: 90 %
SAO2 % BLDA: 96 %
SODIUM BLOOD GAS (OHS): 134 MMOL/L (ref 137–145)
SODIUM BLOOD GAS (OHS): 137 MMOL/L (ref 137–145)
SODIUM SERPL-SCNC: 136 MMOL/L (ref 136–145)
SPONT+MECH VT ON VENT: 500 ML
TRIGL SERPL-MCNC: 102 MG/DL (ref 34–140)
TROPONIN I SERPL-MCNC: 0.04 NG/ML (ref 0–0.04)
WBC # SPEC AUTO: 12.28 X10(3)/MCL (ref 4.5–11.5)

## 2024-03-20 PROCEDURE — 84100 ASSAY OF PHOSPHORUS: CPT

## 2024-03-20 PROCEDURE — 82803 BLOOD GASES ANY COMBINATION: CPT

## 2024-03-20 PROCEDURE — 94003 VENT MGMT INPAT SUBQ DAY: CPT

## 2024-03-20 PROCEDURE — 85027 COMPLETE CBC AUTOMATED: CPT

## 2024-03-20 PROCEDURE — 94640 AIRWAY INHALATION TREATMENT: CPT

## 2024-03-20 PROCEDURE — 94760 N-INVAS EAR/PLS OXIMETRY 1: CPT | Mod: XB

## 2024-03-20 PROCEDURE — 99900035 HC TECH TIME PER 15 MIN (STAT)

## 2024-03-20 PROCEDURE — 27200966 HC CLOSED SUCTION SYSTEM

## 2024-03-20 PROCEDURE — 84484 ASSAY OF TROPONIN QUANT: CPT | Performed by: NURSE PRACTITIONER

## 2024-03-20 PROCEDURE — 83735 ASSAY OF MAGNESIUM: CPT

## 2024-03-20 PROCEDURE — 83605 ASSAY OF LACTIC ACID: CPT | Performed by: INTERNAL MEDICINE

## 2024-03-20 PROCEDURE — 80053 COMPREHEN METABOLIC PANEL: CPT

## 2024-03-20 PROCEDURE — 87070 CULTURE OTHR SPECIMN AEROBIC: CPT

## 2024-03-20 PROCEDURE — 20000000 HC ICU ROOM

## 2024-03-20 PROCEDURE — 63600175 PHARM REV CODE 636 W HCPCS

## 2024-03-20 PROCEDURE — 63600175 PHARM REV CODE 636 W HCPCS: Performed by: INTERNAL MEDICINE

## 2024-03-20 PROCEDURE — 25000003 PHARM REV CODE 250: Performed by: INTERNAL MEDICINE

## 2024-03-20 PROCEDURE — 99900031 HC PATIENT EDUCATION (STAT)

## 2024-03-20 PROCEDURE — 25000242 PHARM REV CODE 250 ALT 637 W/ HCPCS: Performed by: STUDENT IN AN ORGANIZED HEALTH CARE EDUCATION/TRAINING PROGRAM

## 2024-03-20 PROCEDURE — 27100171 HC OXYGEN HIGH FLOW UP TO 24 HOURS

## 2024-03-20 PROCEDURE — 25000003 PHARM REV CODE 250

## 2024-03-20 PROCEDURE — 63600175 PHARM REV CODE 636 W HCPCS: Mod: JZ,JG

## 2024-03-20 PROCEDURE — 99900026 HC AIRWAY MAINTENANCE (STAT)

## 2024-03-20 PROCEDURE — 36600 WITHDRAWAL OF ARTERIAL BLOOD: CPT

## 2024-03-20 PROCEDURE — 25000003 PHARM REV CODE 250: Performed by: NURSE PRACTITIONER

## 2024-03-20 PROCEDURE — 84478 ASSAY OF TRIGLYCERIDES: CPT

## 2024-03-20 PROCEDURE — 27000207 HC ISOLATION

## 2024-03-20 PROCEDURE — C1751 CATH, INF, PER/CENT/MIDLINE: HCPCS

## 2024-03-20 RX ORDER — ENOXAPARIN SODIUM 100 MG/ML
40 INJECTION SUBCUTANEOUS EVERY 24 HOURS
Status: DISCONTINUED | OUTPATIENT
Start: 2024-03-20 | End: 2024-03-21

## 2024-03-20 RX ORDER — AMIODARONE HYDROCHLORIDE 200 MG/1
200 TABLET ORAL DAILY
Status: DISCONTINUED | OUTPATIENT
Start: 2024-03-31 | End: 2024-03-24

## 2024-03-20 RX ORDER — MIDODRINE HYDROCHLORIDE 2.5 MG/1
2.5 TABLET ORAL 2 TIMES DAILY WITH MEALS
Status: DISCONTINUED | OUTPATIENT
Start: 2024-03-20 | End: 2024-03-21

## 2024-03-20 RX ORDER — LORAZEPAM 1 MG/1
2 TABLET ORAL EVERY 4 HOURS PRN
Status: DISCONTINUED | OUTPATIENT
Start: 2024-03-20 | End: 2024-04-12 | Stop reason: HOSPADM

## 2024-03-20 RX ORDER — LEVALBUTEROL 1.25 MG/.5ML
1.25 SOLUTION, CONCENTRATE RESPIRATORY (INHALATION) EVERY 12 HOURS
Status: DISCONTINUED | OUTPATIENT
Start: 2024-03-20 | End: 2024-03-21

## 2024-03-20 RX ORDER — FAMOTIDINE 10 MG/ML
20 INJECTION INTRAVENOUS 2 TIMES DAILY
Status: DISCONTINUED | OUTPATIENT
Start: 2024-03-20 | End: 2024-03-21

## 2024-03-20 RX ORDER — MORPHINE SULFATE 4 MG/ML
INJECTION, SOLUTION INTRAMUSCULAR; INTRAVENOUS
Status: DISPENSED
Start: 2024-03-20 | End: 2024-03-20

## 2024-03-20 RX ORDER — FOLIC ACID 1 MG/1
1 TABLET ORAL DAILY
Status: DISCONTINUED | OUTPATIENT
Start: 2024-03-21 | End: 2024-04-12 | Stop reason: HOSPADM

## 2024-03-20 RX ORDER — SODIUM CHLORIDE FOR INHALATION 3 %
4 VIAL, NEBULIZER (ML) INHALATION ONCE
Status: COMPLETED | OUTPATIENT
Start: 2024-03-20 | End: 2024-03-20

## 2024-03-20 RX ORDER — AMIODARONE HYDROCHLORIDE 200 MG/1
400 TABLET ORAL 2 TIMES DAILY
Status: DISCONTINUED | OUTPATIENT
Start: 2024-03-20 | End: 2024-03-24

## 2024-03-20 RX ORDER — LORAZEPAM 2 MG/ML
2 INJECTION INTRAMUSCULAR EVERY 4 HOURS PRN
Status: DISCONTINUED | OUTPATIENT
Start: 2024-03-20 | End: 2024-03-20

## 2024-03-20 RX ORDER — MORPHINE SULFATE 4 MG/ML
2 INJECTION, SOLUTION INTRAMUSCULAR; INTRAVENOUS EVERY 6 HOURS PRN
Status: DISCONTINUED | OUTPATIENT
Start: 2024-03-20 | End: 2024-04-12 | Stop reason: HOSPADM

## 2024-03-20 RX ORDER — SCOLOPAMINE TRANSDERMAL SYSTEM 1 MG/1
1 PATCH, EXTENDED RELEASE TRANSDERMAL
Status: DISCONTINUED | OUTPATIENT
Start: 2024-03-20 | End: 2024-03-26

## 2024-03-20 RX ORDER — AMIODARONE HYDROCHLORIDE 200 MG/1
200 TABLET ORAL 2 TIMES DAILY
Status: DISCONTINUED | OUTPATIENT
Start: 2024-03-25 | End: 2024-03-24

## 2024-03-20 RX ADMIN — AMIODARONE HYDROCHLORIDE 400 MG: 200 TABLET ORAL at 08:03

## 2024-03-20 RX ADMIN — THIAMINE HYDROCHLORIDE 500 MG: 100 INJECTION, SOLUTION INTRAMUSCULAR; INTRAVENOUS at 02:03

## 2024-03-20 RX ADMIN — SODIUM CHLORIDE SOLN NEBU 3% 4 ML: 3 NEBU SOLN at 07:03

## 2024-03-20 RX ADMIN — VANCOMYCIN HYDROCHLORIDE 1250 MG: 1.25 INJECTION, POWDER, LYOPHILIZED, FOR SOLUTION INTRAVENOUS at 11:03

## 2024-03-20 RX ADMIN — OXYCODONE HYDROCHLORIDE 5 MG: 5 TABLET ORAL at 09:03

## 2024-03-20 RX ADMIN — PIPERACILLIN AND TAZOBACTAM 4.5 G: 4; .5 INJECTION, POWDER, LYOPHILIZED, FOR SOLUTION INTRAVENOUS; PARENTERAL at 11:03

## 2024-03-20 RX ADMIN — CHLORHEXIDINE GLUCONATE 0.12% ORAL RINSE 15 ML: 1.2 LIQUID ORAL at 09:03

## 2024-03-20 RX ADMIN — THIAMINE HYDROCHLORIDE 500 MG: 100 INJECTION, SOLUTION INTRAMUSCULAR; INTRAVENOUS at 11:03

## 2024-03-20 RX ADMIN — ATORVASTATIN CALCIUM 80 MG: 40 TABLET, FILM COATED ORAL at 09:03

## 2024-03-20 RX ADMIN — MORPHINE SULFATE 2 MG: 4 INJECTION, SOLUTION INTRAMUSCULAR; INTRAVENOUS at 08:03

## 2024-03-20 RX ADMIN — PIPERACILLIN AND TAZOBACTAM 4.5 G: 4; .5 INJECTION, POWDER, LYOPHILIZED, FOR SOLUTION INTRAVENOUS; PARENTERAL at 02:03

## 2024-03-20 RX ADMIN — DEXMEDETOMIDINE HYDROCHLORIDE 1 MCG/KG/HR: 400 INJECTION INTRAVENOUS at 09:03

## 2024-03-20 RX ADMIN — FAMOTIDINE 20 MG: 10 INJECTION, SOLUTION INTRAVENOUS at 08:03

## 2024-03-20 RX ADMIN — AMIODARONE HYDROCHLORIDE 0.5 MG/MIN: 1.8 INJECTION, SOLUTION INTRAVENOUS at 05:03

## 2024-03-20 RX ADMIN — OXYCODONE HYDROCHLORIDE 5 MG: 5 TABLET ORAL at 05:03

## 2024-03-20 RX ADMIN — PIPERACILLIN AND TAZOBACTAM 4.5 G: 4; .5 INJECTION, POWDER, LYOPHILIZED, FOR SOLUTION INTRAVENOUS; PARENTERAL at 07:03

## 2024-03-20 RX ADMIN — ENOXAPARIN SODIUM 40 MG: 40 INJECTION SUBCUTANEOUS at 05:03

## 2024-03-20 RX ADMIN — SCOPOLAMINE 1 PATCH: 1 PATCH TRANSDERMAL at 02:03

## 2024-03-20 RX ADMIN — CHLORHEXIDINE GLUCONATE 0.12% ORAL RINSE 15 ML: 1.2 LIQUID ORAL at 08:03

## 2024-03-20 RX ADMIN — THIAMINE HYDROCHLORIDE 500 MG: 100 INJECTION, SOLUTION INTRAMUSCULAR; INTRAVENOUS at 08:03

## 2024-03-20 RX ADMIN — MIDAZOLAM IN SODIUM CHLORIDE 2 MG/HR: 1 INJECTION INTRAVENOUS at 01:03

## 2024-03-20 RX ADMIN — MIDODRINE HYDROCHLORIDE 2.5 MG: 2.5 TABLET ORAL at 05:03

## 2024-03-20 RX ADMIN — DEXMEDETOMIDINE HYDROCHLORIDE 0.8 MCG/KG/HR: 400 INJECTION INTRAVENOUS at 01:03

## 2024-03-20 NOTE — NURSING
Nurses Note -- 4 Eyes      3/20/2024   5:18 AM      Skin assessed during: Daily Assessment      [] No Altered Skin Integrity Present    [x]Prevention Measures Documented      [x] Yes- Altered Skin Integrity Present or Discovered   [] LDA Added if Not in Epic (Describe Wound)   [] New Altered Skin Integrity was Present on Admit and Documented in LDA   [] Wound Image Taken    Wound Care Consulted? Yes    Attending Nurse:  Brittany Hollis RN/Staff Member:   Alina Lynne RN

## 2024-03-20 NOTE — NURSING
Subjective:      Patient ID: Ran Reyes Jr. is a 68 y.o. male.    Chief Complaint: Rectal Bleeding (Rectal bleeding that started today, reports bright red blood when coughing. Reports left lower quadrant abdominal pain. Hx of aortic aneurysm. +BT)    HPI  Review of Systems   Objective:     Physical Exam   Assessment:     1. Toxic dilatation of colon    2. Rectal bleeding    3. Swelling    4. Scrotal hematoma    5. Anemia, unspecified type    6. Atrial fibrillation with RVR    7. Elevated troponin    8. Infected hydrocele    9. A-fib    10. Wide-complex tachycardia           Incision/Site 03/18/24 1649 Left Scrotum lateral vertical (Active)   03/18/24 1649   Present Prior to Hospital Arrival?:    Side: Left   Location: Scrotum   Orientation: lateral   Incision Type: vertical   Closure Method: Other (see comments)   Additional Comments: left open - packed with dakins soaked kerlix, fluffs, ABD pad, and mesh panties   Removal Indication and Assessment:    Wound Outcome:    Removal Indications:    Wound Image   03/20/24 1000   Incision WDL WDL 03/19/24 2000   Dressing Appearance Intact;Moist drainage 03/20/24 1000   Drainage Amount Moderate 03/20/24 1000   Drainage Characteristics/Odor Serosanguineous;No odor 03/20/24 1000   Appearance Red;Moist;Sutures intact 03/20/24 1000   Red (%), Wound Tissue Color 100 % 03/20/24 1000   Periwound Area Dry 03/20/24 1000   Wound Edges Irregular 03/20/24 1000   Wound Length (cm) 7 cm 03/20/24 1000   Wound Width (cm) 9.5 cm 03/20/24 1000   Wound Depth (cm) 2 cm 03/20/24 1000   Wound Volume (cm^3) 133 cm^3 03/20/24 1000   Wound Surface Area (cm^2) 66.5 cm^2 03/20/24 1000   Undermining (depth (cm)/location) 7-4 o ck deepest 1 o ck 5cm 03/20/24 1000   Care Cleansed with:;Antimicrobial agent 03/20/24 1000   Dressing Gauze, wet to dry 03/20/24 1000   Packing packed with;other (see comment) 03/19/24 1300   Periwound Care Dry periwound area maintained 03/19/24 0800       Plan:        68  y/o male in with scrotal hematoma that was I&D 18th .  He is currently intubated and pain med given for agitation and wd care to his scrotum at this time per nurse Haynes.   See photo of left scrotum-Orders inplace per dr. Rivas-clarification of orders for staffing.  Pt tolerated dressing change and instructions to nurse Haynes.  Will follow up in a few days.

## 2024-03-20 NOTE — PROGRESS NOTES
[facility] - [facility] ICU  Pulmonary Critical Care Note    Patient Name: Ran Reyes Jr.  MRN: [id number]  Admission Date: 3/17/2024  Hospital Length of Stay: 2 days  Code Status: Full Code  Attending Provider: Cassidy Obrien MD  Primary Care Provider: Shiela, Primary Doctor     Subjective:     HPI: Patient is a 68-year-old male with past medical history of nonischemic cardiomyopathy, HFrEF (EF 41%), chronic atrial fibrillation on Xarelto, peripheral arterial disease, COPD, hypertension, large left hydrocele who presented to the emergency department on 03/18/2024 for what he thought to be rectal bleeding.  Patient eventually found that source was from posterior aspect of scrotum.  Patient has had persistent swelling in that region for at least the last couple of weeks.  In the emergency room patient was tachycardic and EKG showed AFib with RVR, blood pressure is were slightly on low side.  Patient did have leukocytosis of 15,000, hemoglobin had showed significant decrease from last check.  Doppler ultrasound could not rule out torsion.  Cultures were drawn and broad-spectrum antibiotics were started.  Urology was consulted and they made decision to proceed with emergent scrotal exploration.  Patient underwent surgery, seemed to tolerate well.  In the PACU, patient was persistently hypotensive with blood pressures staying approximately 80/50 despite fluid resuscitation.  Patient was placed on phenylephrine for vasopressor support and ICU was consulted for admission.      Hospital Course/Significant events:  3/17/24 - seen in ER at [facility] for scrotal pain and bleeding  3/18/24 - D, abscess removal/culture left joselito orchiectomy; subsequent hypootension and code    24 Hour Interval History:  Overnight, noted to have distended abdomen in addition to dilated loops of bowel noted on CXR for ETT confirmation. Subsequent Abdominal XR Flat and erect revealed further further dilatation of bowels, negative for free air  under diaphragm. CTAP w/ IV con revealed evidence of bowel obstruction, more so appearing as partial obstruction over full. Gen Surgery consulted, discussed case, continue current management (bowel rest, NG on suction, serial abdominal exams) for time being.  Concern over triglyceride level yest at 1500.  Discontinued propofol with repeat triglyceride level this morning showing remarkable one day improvement from 1569 to 81. Stopped Fentanyl for sedation (given partial bowel obstruction) and added propofol back for sedation.     Past Medical History:   Diagnosis Date    A-fib     Anticoagulant long-term use     Aortic aneurysm     Cataract     CHF (congestive heart failure)     Chronic atrial fibrillation     COPD (chronic obstructive pulmonary disease)     Coronary artery disease     HLD (hyperlipidemia)     Hypertension     Mitral regurgitation     PAD (peripheral artery disease)     Primary open angle glaucoma (POAG)        Past Surgical History:   Procedure Laterality Date    ATHERECTOMY OF PERIPHERAL VESSEL Left 09/12/2022    LEFT SFA ATHERECTOMY, BALLOON ANGIOPLASTY    CATARACT EXTRACTION W/  INTRAOCULAR LENS IMPLANT Left 3/9/2023    Procedure: EXTRACTION, CATARACT, WITH IOL INSERTION;  Surgeon: Georgi Borja MD;  Location: [facility] OR;  Service: Ophthalmology;  Laterality: Left;  19.5  mac    Heart Stent N/A     > 10yrs. AGO    INCISION AND DRAINAGE N/A 3/18/2024    Procedure: Incision and Drainage;  Surgeon: Fahad Rivas MD;  Location: [facility] OR;  Service: Urology;  Laterality: N/A;  I&D SCROTAL ABSCESS    INSERTION OF STENT INTO PERIPHERAL VESSEL Right 10/17/2022    RIGHT SFA ATHERECTOMY, BALLOON ANGIOPLASTY, STENT; RIGHT ANTERIOR TIBIAL ARTERY ATHERECTOMY, BALLOON ANGIOPLASTY    ORCHIECTOMY Left 3/18/2024    Procedure: ORCHIECTOMY;  Surgeon: Fahad Rivas MD;  Location: [facility] OR;  Service: Urology;  Laterality: Left;       Social History     Socioeconomic History    Marital  status: Single   Tobacco Use    Smoking status: Every Day     Current packs/day: 0.25     Average packs/day: 0.3 packs/day for 40.0 years (10.0 ttl pk-yrs)     Types: Cigarettes     Passive exposure: Current    Smokeless tobacco: Never   Substance and Sexual Activity    Alcohol use: Yes     Alcohol/week: 3.0 standard drinks of alcohol     Types: 3 Cans of beer per week     Comment: socially    Drug use: Yes     Frequency: 1.0 times per week     Types: Marijuana     Comment: no use in over 2 months     Social Determinants of Health     Financial Resource Strain: Patient Unable To Answer (3/19/2024)    Overall Financial Resource Strain (CARDIA)     Difficulty of Paying Living Expenses: Patient unable to answer   Food Insecurity: Patient Unable To Answer (3/19/2024)    Hunger Vital Sign     Worried About Running Out of Food in the Last Year: Patient unable to answer     Ran Out of Food in the Last Year: Patient unable to answer   Transportation Needs: Patient Unable To Answer (3/19/2024)    PRAPARE - Transportation     Lack of Transportation (Medical): Patient unable to answer     Lack of Transportation (Non-Medical): Patient unable to answer   Physical Activity: Inactive (11/30/2022)    Exercise Vital Sign     Days of Exercise per Week: 0 days     Minutes of Exercise per Session: 0 min   Stress: Patient Unable To Answer (3/19/2024)    [employer] - Occupational Stress Questionnaire     Feeling of Stress : Patient unable to answer   Social Connections: Socially Isolated (11/30/2022)    Social Connection and Isolation Panel [NHANES]     Frequency of Communication with Friends and Family: Never     Frequency of Social Gatherings with Friends and Family: Never     Attends Restorationist Services: More than 4 times per year     Active Member of Clubs or Organizations: No     Attends Club or Organization Meetings: Never     Marital Status: Never    Housing Stability: Patient  Unable To Answer (3/19/2024)    Housing Stability Vital Sign     Unable to Pay for Housing in the Last Year: Patient unable to answer     Unstable Housing in the Last Year: Patient unable to answer           Objective:     Current Outpatient Medications   Medication Instructions    cetirizine (ZYRTEC) 10 mg, Oral, Daily    clopidogreL (PLAVIX) 75 mg, Oral, Daily    empagliflozin (JARDIANCE) 10 mg, Oral, Daily    furosemide (LASIX) 20 mg, Oral, Daily    hydrOXYzine HCL (ATARAX) 50 mg, Oral, 2 times daily PRN    rivaroxaban (XARELTO) 20 mg, Oral    sacubitriL-valsartan (ENTRESTO) 49-51 mg per tablet 1 tablet, Oral, 2 times daily    simvastatin (ZOCOR) 40 mg, Oral, Nightly    spironolactone (ALDACTONE) 25 mg, Oral, Daily    triamcinolone acetonide 0.1% (KENALOG) 0.1 % cream Topical (Top), Daily       Current Inpatient Medications   atorvastatin  80 mg Oral Daily    chlorhexidine  15 mL Mouth/Throat BID    midazolam  2 mg Intravenous Once    piperacillin-tazobactam (Zosyn) IV (PEDS and ADULTS) (extended infusion is not appropriate)  4.5 g Intravenous Q8H    thiamine (B-1) 500 mg in dextrose 5 % (D5W) 100 mL IVPB  500 mg Intravenous TID    vancomycin (VANCOCIN) IV (PEDS and ADULTS)  1,250 mg Intravenous Q12H           Intake/Output Summary (Last 24 hours) at 3/20/2024 0847  Last data filed at 3/20/2024 0631  Gross per 24 hour   Intake 3927.26 ml   Output 1050 ml   Net 2877.26 ml         Review of Systems   Unable to perform ROS: Intubated        Vital Signs (Most Recent):  Temp: 98.7 °F (37.1 °C) (03/20/24 0800)  Pulse: 70 (03/20/24 0645)  Resp: (!) 26 (03/20/24 0645)  BP: 102/62 (03/20/24 0645)  SpO2: 98 % (03/20/24 0645)  Body mass index is 27.67 kg/m².  Weight: 90 kg (198 lb 6.6 oz) Vital Signs (24h Range):  Temp:  [97.7 °F (36.5 °C)-98.7 °F (37.1 °C)] 98.7 °F (37.1 °C)  Pulse:  [55-84] 70  Resp:  [17-32] 26  SpO2:  [95 %-100 %] 98 %  BP: ()/(46-90) 102/62     Physical Exam  Vitals and nursing  note reviewed.   Constitutional:       Appearance: He is obese.   HENT:      Head: Normocephalic.   Eyes:      General: No scleral icterus.     Extraocular Movements: Extraocular movements intact.      Pupils: Pupils are equal, round, and reactive to light.   Cardiovascular:      Rate and Rhythm: Bradycardia present. Rhythm irregular.      Pulses: Normal pulses.      Heart sounds: Normal heart sounds. No murmur heard.  Pulmonary:      Effort: No respiratory distress.      Breath sounds: Rales present. No wheezing.   Abdominal:      General: There is distension.      Hernia: No hernia is present.   Musculoskeletal:         General: No swelling or tenderness.      Cervical back: Normal range of motion.      Right lower leg: No edema.      Left lower leg: No edema.   Skin:     General: Skin is warm.      Capillary Refill: Capillary refill takes less than 2 seconds.      Coloration: Skin is not jaundiced or pale.      Findings: No lesion or rash.         Mechanical ventilation support:  Vent Mode: SIMV (03/20/24 0532)  Set Rate: 18 BPM (03/20/24 0532)  Vt Set: 500 mL (03/20/24 0532)  Pressure Support: 10 cmH20 (03/20/24 0532)  PEEP/CPAP: 5 cmH20 (03/20/24 0532)  Oxygen Concentration (%): 30 (03/20/24 0532)  Peak Airway Pressure: 20 cmH20 (03/20/24 0532)  Total Ve: 9 L/m (03/20/24 0532)  F/VT Ratio<105 (RSBI): (!) 35.29 (03/20/24 0532)    Lines/Drains/Airways       Central Venous Catheter Line  Duration             Percutaneous Central Line - Triple Lumen  03/19/24 0400 Internal Jugular Right 1 day              Drain  Duration                  NG/OG Tube 03/18/24 2200 Center mouth 1 day         Urethral Catheter 03/18/24 1426 Straight-tip 16 Fr. 1 day              Airway  Duration                  Airway - Non-Surgical Endotracheal Tube -- days              Peripheral Intravenous Line  Duration                  Peripheral IV - Single Lumen 03/18/24 1321 20 G Left;Posterior Hand 1 day         Peripheral IV - Single Lumen  03/18/24 1930 20 G Distal;Posterior;Right Forearm 1 day         Peripheral IV - Single Lumen 03/18/24 2300 20 G Distal;Left;Posterior Forearm 1 day         Peripheral IV - Single Lumen 03/19/24 0107 18 G Anterior;Proximal;Right Forearm 1 day                    Significant Labs:    Lab Results   Component Value Date    WBC 12.28 (H) 03/20/2024    WBC 12.28 03/20/2024    HGB 8.5 (L) 03/20/2024    HCT 25.7 (L) 03/20/2024    MCV 94.5 (H) 03/20/2024     03/20/2024         BMP  Lab Results   Component Value Date     03/20/2024    K 3.7 03/20/2024    CO2 25 03/20/2024    BUN 15.2 03/20/2024    CREATININE 0.84 03/20/2024    CALCIUM 7.4 (L) 03/20/2024    EGFRNONAA 88 (L) 12/06/2021       ABG  Recent Labs   Lab 03/20/24  0539   PH 7.510*   PO2 73.0*   PCO2 41.0   HCO3 32.7*             Significant Imaging:  I have reviewed all pertinent imaging within the past 24 hours.        Assessment/Plan:     Assessment  Scrotal abscess s/p I&D with left joselito orchiectomy 03/18/2024  Recent cardiac arrest w/ associated (03/18/2024)  Sepsis, likely from urinary tract infection req vasopressor support  Chronic atrial fibrillation, on anticoagulation with Xarelto  Partial bowel obstruction  HFrEF (EF 41%)  VHD  Type 2 NSTEMI  History of COPD   History of hypertension   History of hyperlipidemia      Plan  -continue ICU level care and close monitoring  -Consulted Cardiology for cardiac arrest yesterday, which appeared to start as PEA/symptomatic bradycardia followed by vfib followed by monomorphic v tach; continues on amiodarone drip for time being  -added dobutamine 5 mcg/kg/min in addition to Levo    -Per Dr. Johnson (OP Cardiologist) holding BB at this time due HR 50-60s  -Sees Dr. Kulkarni for PAD s/p PCI; no able to access [facility] EMR for recent note, but continued by Dr. Johnson as of 1/30/24  -Persistent afib on Xarelto 20 mg daily  -Will reach out to Urology today for recommendations for restarting anticoagulation/blood-thinner s/p  I&D LT joselito orchiectomy 03/18/2020  -general surgery following for partial bowel obstruction; continue bowel rest, NG with suction, and serial abdominal exams  -hypertriglyceridemia roughly 1500 discovered 3/18, propofol stopped at time with amylase/lipase and CT unremarkable for pancreatitis findings; self-resolution (w/o insulin drip) on 3/19 to 81??   -Added propofol back on for sedation    -switch fentanyl drip to Versed drip given concomitant partial bowel obstruction   -progress for Precedex for sedation given history of alcoholism  -thiamine 500 mg t.i.d. for 3 days; folic acid 1 mg daily  -infectious workup underway  -continues on Zosyn and vancomycin  -continue monitoring electrolytes  -Lactate normalized       DVT Prophylaxis:  SCDs  GI Prophylaxis:  None     Greater than 30 minutes of critical care was time spent personally by me on the following activities: development of treatment plan with patient or surrogate and bedside caregivers, discussions with consultants, evaluation of patient's response to treatment, examination of patient, ordering and performing treatments and interventions, ordering and review of laboratory studies, ordering and review of radiographic studies, pulse oximetry, re-evaluation of patient's condition.  This critical care time did not overlap with that of any other provider or involve time for any procedures.    David Gonsalez MD  Internal Medicine - PGY-2    Pulmonary Critical Care Medicine  [facility] - [address] [place] [address]

## 2024-03-20 NOTE — PROGRESS NOTES
UROLOGY  PROGRESS  NOTE    Ran Reyes Jr. 1955  [id number]  3/20/2024    POD 2 s/p left hemiscrotal exploration, left orchiectomy, washout/debridement of wound    Patient extubated recently  Denies any pain currently  Wound care nurse changed packing earlier this morning    afebrile   450 mL urine output overnight  WBC 12.28   H&H 8.5/25.7   BUN and creatinine 15.2/0.84    Intra-op Cultures with no growth at 24 hrs - on vanc and Zosyn  UC from admit with 10-25,000 colonies yeast    Intake/Output:  I/O this shift:  In: -   Out: 115 [Urine:115]  I/O last 3 completed shifts:  In: 6117.4 [I.V.:2071.7; IV Piggyback:4045.7]  Out: 2250 [Urine:2000; Drains:250]     Exam:    sleepy  Resp: nonlabored  Abd: soft, ND  : clear yellow urine draining to  bag; scrotal wound packed, medial and inferior edges with some necrosis, grossly unchanged compared to yesterday  Extremity: no C/C/E    Recent Results (from the past 24 hour(s))   VANCOMYCIN, TROUGH    Collection Time: 03/19/24 10:29 AM   Result Value Ref Range    Vancomycin Trough 10.0 (L) 15.0 - 20.0 ug/ml   Triglycerides    Collection Time: 03/19/24 10:29 AM   Result Value Ref Range    Triglyceride 85 34 - 140 mg/dL   Lactic Acid, Plasma    Collection Time: 03/19/24 10:29 AM   Result Value Ref Range    Lactic Acid Level 0.9 0.5 - 2.2 mmol/L   POCT glucose    Collection Time: 03/19/24 10:55 AM   Result Value Ref Range    POCT Glucose 202 (H) 70 - 110 mg/dL   POCT glucose    Collection Time: 03/19/24  4:07 PM   Result Value Ref Range    POCT Glucose 182 (H) 70 - 110 mg/dL   Magnesium    Collection Time: 03/19/24  5:42 PM   Result Value Ref Range    Magnesium Level 2.30 1.60 - 2.60 mg/dL   Phosphorus    Collection Time: 03/19/24  5:42 PM   Result Value Ref Range    Phosphorus Level 2.2 (L) 2.3 - 4.7 mg/dL   Basic Metabolic Panel    Collection Time: 03/19/24  5:42 PM   Result Value Ref Range    Sodium Level 137 136 - 145 mmol/L    Potassium Level 3.4 (L) 3.5 - 5.1  mmol/L    Chloride 101 98 - 107 mmol/L    Carbon Dioxide 24 23 - 31 mmol/L    Glucose Level 157 (H) 82 - 115 mg/dL    Blood Urea Nitrogen 16.2 8.4 - 25.7 mg/dL    Creatinine 0.81 0.73 - 1.18 mg/dL    BUN/Creatinine Ratio 20     Calcium Level Total 7.6 (L) 8.8 - 10.0 mg/dL    Anion Gap 12.0 mEq/L    eGFR >60 mls/min/1.73/m2   POCT glucose    Collection Time: 03/19/24  9:26 PM   Result Value Ref Range    POCT Glucose 154 (H) 70 - 110 mg/dL   Troponin I    Collection Time: 03/20/24  2:06 AM   Result Value Ref Range    Troponin-I 0.040 0.000 - 0.045 ng/mL   Lactic Acid, Plasma    Collection Time: 03/20/24  2:06 AM   Result Value Ref Range    Lactic Acid Level 1.8 0.5 - 2.2 mmol/L   Phosphorus    Collection Time: 03/20/24  2:06 AM   Result Value Ref Range    Phosphorus Level 2.8 2.3 - 4.7 mg/dL   Magnesium    Collection Time: 03/20/24  2:06 AM   Result Value Ref Range    Magnesium Level 2.30 1.60 - 2.60 mg/dL   Comprehensive metabolic panel    Collection Time: 03/20/24  2:06 AM   Result Value Ref Range    Sodium Level 136 136 - 145 mmol/L    Potassium Level 3.7 3.5 - 5.1 mmol/L    Chloride 101 98 - 107 mmol/L    Carbon Dioxide 25 23 - 31 mmol/L    Glucose Level 127 (H) 82 - 115 mg/dL    Blood Urea Nitrogen 15.2 8.4 - 25.7 mg/dL    Creatinine 0.84 0.73 - 1.18 mg/dL    Calcium Level Total 7.4 (L) 8.8 - 10.0 mg/dL    Protein Total 4.8 (L) 5.8 - 7.6 gm/dL    Albumin Level 2.0 (L) 3.4 - 4.8 g/dL    Globulin 2.8 2.4 - 3.5 gm/dL    Albumin/Globulin Ratio 0.7 (L) 1.1 - 2.0 ratio    Bilirubin Total 0.4 <=1.5 mg/dL    Alkaline Phosphatase 55 40 - 150 unit/L    Alanine Aminotransferase 12 0 - 55 unit/L    Aspartate Aminotransferase 17 5 - 34 unit/L    eGFR >60 mls/min/1.73/m2   Triglycerides    Collection Time: 03/20/24  2:06 AM   Result Value Ref Range    Triglyceride 102 34 - 140 mg/dL   CBC with Differential    Collection Time: 03/20/24  2:06 AM   Result Value Ref Range    WBC 12.28 (H) 4.50 - 11.50 x10(3)/mcL    RBC 2.72 (L)  4.70 - 6.10 x10(6)/mcL    Hgb 8.5 (L) 14.0 - 18.0 g/dL    Hct 25.7 (L) 42.0 - 52.0 %    MCV 94.5 (H) 80.0 - 94.0 fL    MCH 31.3 (H) 27.0 - 31.0 pg    MCHC 33.1 33.0 - 36.0 g/dL    RDW 16.3 11.5 - 17.0 %    Platelet 154 130 - 400 x10(3)/mcL    MPV 11.4 (H) 7.4 - 10.4 fL    NRBC% 0.0 %   Manual Differential    Collection Time: 03/20/24  2:06 AM   Result Value Ref Range    WBC 12.28 x10(3)/mcL    Neutrophils % 77 %    Lymphs % 13 %    Monocytes % 8 %    Promyelocytes % 1 (H) <=0 %    Plasmacytes % 1 %    nRBC % 1 %    Neutrophils Abs 9.4556 (H) 2.1 - 9.2 x10(3)/mcL    Lymphs Abs 1.5964 0.6 - 4.6 x10(3)/mcL    Monocytes Abs 0.9824 0.1 - 1.3 x10(3)/mcL    Platelets Normal Normal, Adequate    RBC Morph Abnormal (A) Normal    Poikilocytosis 2+ (A) (none)    Anisocytosis 1+ (A) (none)    Macrocytosis 1+ (A) (none)    Yoel Cells 1+ (A) (none)   RT Blood Gas    Collection Time: 03/20/24  5:39 AM   Result Value Ref Range    Sample Type Arterial Blood     Sample site Left Radial Artery     Drawn by sd rrt     pH, Blood gas 7.510 (H) 7.350 - 7.450    pCO2, Blood gas 41.0 35.0 - 45.0 mmHg    pO2, Blood gas 73.0 (L) 80.0 - 100.0 mmHg    Sodium, Blood Gas 134 (L) 137 - 145 mmol/L    Potassium, Blood Gas 3.5 3.5 - 5.0 mmol/L    Calcium Level Ionized 1.05 (L) 1.12 - 1.23 mmol/L    TOC2, Blood gas 34.0 mmol/L    Base Excess, Blood gas 8.80 mmol/L    sO2, Blood gas 96.0 %    HCO3, Blood gas 32.7 (H) 22.0 - 26.0 mmol/L    Allens Test Yes     MODE SIMV     Oxygen Device, Blood gas Ventilator     FIO2, Blood gas 30 %    Mech Vt 500 ml    Mech RR 18 b/min    PEEP 5.0 cmH2O    PS 10.0 cmH2O       Assessment:  -scrotal abscess s/p left hemiscrotal exploration, left orchiectomy, washout/debridement of wound  -sepsis - on zosyn and vancomycin, BC and UC pending  -NSTEMI  -AFib RVR, chronic AFib on Xarelto Xarelto currently on hold - [facility] consulted  -COPD, nonischemic cardiomyopathy    Plan:  -Continue antibiotics, tailor according to final C&S  results  -OK for blood thinners from Urology standpoint - lovenox would be ideal in case he requires additional surgical intervention.   -Recs per cardiology/ICU  -Continue wound care with wet-to-dry dressings  -Discussed with nursing  -Following.     Qiana Valenzuela NP

## 2024-03-20 NOTE — PROGRESS NOTES
[facility]    Cardiology  Progress Note    Patient Name: Ran Reyes Jr.  MRN: [id number]  Admission Date: 3/17/2024  Hospital Length of Stay: 2 days  Code Status: Full Code   Attending Physician: Cassidy Obrien MD   Primary Care Physician: Shiela, Primary Doctor  Expected Discharge Date:   Principal Problem:Scrotal hematoma    Subjective:   Chief Complaint/Reason for Consult: OAC recs     HPI: Ran Reyes Jr. Is a 68 year old male, known to Dr. Wagner, with PMH of  cardiomyopathy, systolic heart failure with an EF of 41%, VHD, chronic AFib on Xarelto, peripheral arterial disease, COPD, HTN, and a large left testicle hydrocele who presented to the ED 3/18/24 for scrotal bleeding with testicular swelling and pain. Found to have sepsis likely due to UTI, acute bronchitis. Also found in ED to be in Afib RVR on EKG, bp 90s/60s. BNP 1273, troponin 0.045 --> 0.055. Patient admits to dyspnea, cough, and wheezing. Denies chest pains. Cardiology being consulted for OAC recommendations.    Hospital Course:  3.19.24: Intubated/Mechanical Ventilation. AF SVR. On Vasopressor Support. Family at bedside.   3.20.24: NAD. Vented/Sedated. PAF/CVR. Dobutamine 2.5mcg/kg/min. Amiodarone 0.5mg/min.      PMH: Cardiomyopathy, systolic heart failure with an EF of 41%, VHD, chronic AFib on Xarelto, peripheral arterial disease, COPD, HTN, CAD (Details Unclear)  PSH: cardiac stent >10 years ago, L FA atherectomy and angioplasty, R SFA stent, cataract extraction,   Family History: Father MI at age 66.   Social History: 10+ pack year hx current smoker, social EtOH, denies drugs.     Previous Cardiac Diagnostics:   Echocardiogram (3.19.24):  Left Ventricle: The left ventricle is normal in size. Increased wall thickness. Unable to assess wall motion. There is moderately reduced systolic function with a visually estimated ejection fraction of 30 - 40%.  Right Ventricle: Mild right ventricular enlargement.  Left Atrium: Left atrium  is moderately dilated.  Right Atrium: Right atrium is severely dilated.  Mitral Valve: There is moderate regurgitation.  Tricuspid Valve: There is mild to moderate regurgitation.  Pulmonary Artery: Pulmonary artery pressure could not be estimated.  IVC/SVC: Patient is ventilated, cannot use IVC diameter to estimate right atrial pressure.    Echocardiogram (1.31.24):   Left Ventricle: The left ventricle is normal in size. Mildly increased wall thickness. There is reduced systolic function. Biplane (2D) method of discs ejection fraction is 41%. Unable to assess diastolic function due to atrial fibrillation.  Right Ventricle: Normal right ventricular cavity size. Systolic function is mildly reduced.  Left Atrium: Left atrium is severely dilated.  Right Atrium: Right atrium is severely dilated.  Aortic Valve: The aortic valve is a trileaflet valve.  Mitral Valve: There is bileaflet sclerosis. There is moderate to severe regurgitation.  Tricuspid Valve: There is mild regurgitation.  IVC/SVC: Normal venous pressure at 3 mmHg.    Carotid US (2.7.23):  The study quality is average.   1-39% stenosis in the proximal right internal carotid artery based on Bluth Criteria. Antegrade right vertebral artery flow.   1-39% stenosis in the proximal left internal carotid artery based on Bluth Criteria. Antegrade left vertebral artery flow.     Echocardiogram (11.8.22):  The study quality is average.   The left ventricle is moderately enlarged. Left ventricular diastolic dimension is 6.4 cms. Global left ventricular systolic function is moderately decreased. The left ventricular ejection fraction is 40%. Arrhythmia noted throughout much of the exam.   There is no evidence of mitral stenosis or prolapse appreciated. MV Ortiz score ~ 5. The area by planimetry is 5.3 cm². The mean trans mitral gradient is 1.6 mmHg. Suspect borderline severe (4+) mitral regurgitation with a posteriorly directed jet. MR PISA radius ~ 0.93 cm, MR ERO ~  0.35 cm^2, MR regurgitant volume ~ 72 ml/beat, MR regurgitant fraction ~ 55%, MR vena contracta ~ 0.54 cm.  Mild calcification of the aortic valve is noted with adequate cuspal excursion.   Trace pulmonic regurgitation. Mild (1+) tricuspid regurgitation.    Peripheral Angiogram (10.17.22):  Underwent Successful CSI Atherectomy Balloon Angioplasty and Stenting of the Right SFA and CSI Atherectomy Balloon Angioplasty of Right Anterior Tibial Artery Using Pedal Approach.    Peripheral Angiogram (9.12.22):  Underwent Successful Left SFA Laser Atherectomy Balloon Angioplasty Using Left Radial Artery Approach.    Review of Systems   Unable to perform ROS: Intubated     Objective:     Vital Signs (Most Recent):  Temp: 98.7 °F (37.1 °C) (03/20/24 0800)  Pulse: 70 (03/20/24 0645)  Resp: (!) 30 (03/20/24 0919)  BP: 102/62 (03/20/24 0645)  SpO2: (!) 92 % (03/20/24 1050) Vital Signs (24h Range):  Temp:  [97.7 °F (36.5 °C)-98.7 °F (37.1 °C)] 98.7 °F (37.1 °C)  Pulse:  [55-84] 70  Resp:  [17-30] 30  SpO2:  [92 %-100 %] 92 %  BP: ()/(58-90) 102/62   Weight: 90 kg (198 lb 6.6 oz)  Body mass index is 27.67 kg/m².  SpO2: (!) 92 %       Intake/Output Summary (Last 24 hours) at 3/20/2024 1349  Last data filed at 3/20/2024 1000  Gross per 24 hour   Intake 2382.74 ml   Output 1115 ml   Net 1267.74 ml       Lines/Drains/Airways       Central Venous Catheter Line  Duration             Percutaneous Central Line - Triple Lumen  03/19/24 0400 Internal Jugular Right 1 day              Drain  Duration                  NG/OG Tube 03/18/24 2200 Center mouth 1 day         Urethral Catheter 03/18/24 1426 Straight-tip 16 Fr. 1 day              Airway  Duration                  Airway - Non-Surgical Endotracheal Tube -- days              Peripheral Intravenous Line  Duration                  Peripheral IV - Single Lumen 03/18/24 1321 20 G Left;Posterior Hand 2 days         Peripheral IV - Single Lumen 03/18/24 1930 20 G Distal;Posterior;Right  Forearm 1 day         Peripheral IV - Single Lumen 03/18/24 2300 20 G Distal;Left;Posterior Forearm 1 day         Peripheral IV - Single Lumen 03/19/24 0107 18 G Anterior;Proximal;Right Forearm 1 day                  Significant Labs:   Recent Results (from the past 72 hour(s))   EKG 12-lead    Collection Time: 03/17/24  8:35 PM   Result Value Ref Range    QRS Duration 96 ms    [facility] QTC Calculation 482 ms   Comprehensive metabolic panel    Collection Time: 03/17/24  9:03 PM   Result Value Ref Range    Sodium Level 137 136 - 145 mmol/L    Potassium Level 3.5 3.5 - 5.1 mmol/L    Chloride 101 98 - 107 mmol/L    Carbon Dioxide 21 (L) 23 - 31 mmol/L    Glucose Level 99 82 - 115 mg/dL    Blood Urea Nitrogen 16.6 8.4 - 25.7 mg/dL    Creatinine 0.99 0.73 - 1.18 mg/dL    Calcium Level Total 7.4 (L) 8.8 - 10.0 mg/dL    Protein Total 5.4 (L) 5.8 - 7.6 gm/dL    Albumin Level 2.1 (L) 3.4 - 4.8 g/dL    Globulin 3.3 2.4 - 3.5 gm/dL    Albumin/Globulin Ratio 0.6 (L) 1.1 - 2.0 ratio    Bilirubin Total 1.0 <=1.5 mg/dL    Alkaline Phosphatase 76 40 - 150 unit/L    Alanine Aminotransferase 27 0 - 55 unit/L    Aspartate Aminotransferase 112 (H) 5 - 34 unit/L    eGFR >60 mls/min/1.73/m2   Brain natriuretic peptide    Collection Time: 03/17/24  9:03 PM   Result Value Ref Range    Natriuretic Peptide 1,273.3 (H) <=100.0 pg/mL   APTT    Collection Time: 03/17/24  9:03 PM   Result Value Ref Range    PTT 32.4 23.2 - 33.7 seconds   Protime-INR    Collection Time: 03/17/24  9:03 PM   Result Value Ref Range    PT 19.1 (H) 12.5 - 14.5 seconds    INR 1.6 (H) <=1.3   Magnesium    Collection Time: 03/17/24  9:03 PM   Result Value Ref Range    Magnesium Level 2.20 1.60 - 2.60 mg/dL   Troponin I    Collection Time: 03/17/24  9:03 PM   Result Value Ref Range    Troponin-I 0.063 (H) 0.000 - 0.045 ng/mL   Blood culture #2 **CANNOT BE ORDERED STAT**    Collection Time: 03/17/24  9:03 PM    Specimen: Blood   Result Value Ref Range    CULTURE, BLOOD ([facility]) No  Growth At 48 Hours    Blood culture #1 **CANNOT BE ORDERED STAT**    Collection Time: 03/17/24  9:03 PM    Specimen: Blood   Result Value Ref Range    CULTURE, BLOOD ([facility]) No Growth At 48 Hours    CBC with Differential    Collection Time: 03/17/24  9:03 PM   Result Value Ref Range    WBC 15.53 (H) 4.50 - 11.50 x10(3)/mcL    RBC 3.33 (L) 4.70 - 6.10 x10(6)/mcL    Hgb 10.3 (L) 14.0 - 18.0 g/dL    Hct 31.8 (L) 42.0 - 52.0 %    MCV 95.5 (H) 80.0 - 94.0 fL    MCH 30.9 27.0 - 31.0 pg    MCHC 32.4 (L) 33.0 - 36.0 g/dL    RDW 15.9 11.5 - 17.0 %    Platelet 141 130 - 400 x10(3)/mcL    MPV 11.8 (H) 7.4 - 10.4 fL    NRBC% 0.0 %    IPF 12.5 (H) 0.9 - 11.2 %   Manual Differential    Collection Time: 03/17/24  9:03 PM   Result Value Ref Range    Neutrophils % 91 %    Lymphs % 4 %    Monocytes % 4 %    Neutrophils Abs      Poikilocytosis 1+ (A) (none)    Macrocytosis 1+ (A) (none)    Yoel Cells 1+ (A) (none)   Lactic acid, plasma    Collection Time: 03/17/24  9:09 PM   Result Value Ref Range    Lactic Acid Level 2.9 (H) 0.5 - 2.2 mmol/L   Lactic Acid, Plasma    Collection Time: 03/17/24 11:02 PM   Result Value Ref Range    Lactic Acid Level 1.8 0.5 - 2.2 mmol/L   Urinalysis, Reflex to Urine Culture    Collection Time: 03/18/24 12:34 AM    Specimen: Urine   Result Value Ref Range    Color, UA Yellow Yellow, Light-Yellow, Colorless, Straw, Dark-Yellow    Appearance, UA Clear Clear    Specific Gravity, UA >1.050 (H) 1.005 - 1.030    pH, UA 6.5 5.0 - 8.5    Protein, UA 1+ (A) Negative    Glucose, UA 1+ (A) Negative, Normal    Ketones, UA Negative Negative    Blood, UA Trace (A) Negative    Bilirubin, UA Negative Negative    Urobilinogen, UA 2.0 (A) 0.2, 1.0, Normal    Nitrites, UA Negative Negative    Leukocyte Esterase,  (A) Negative    WBC, UA >100 (A) None Seen, 0-2, 3-5, 0-5 /HPF    Bacteria, UA Many (A) None Seen, Trace /HPF    Squamous Epithelial Cells, UA Trace None Seen /HPF    Mucous, UA Trace (A) None Seen /LPF    RBC,  UA 11-20 (A) None Seen, 0-2, 3-5, 0-5 /HPF   Urine culture    Collection Time: 03/18/24 12:34 AM    Specimen: Urine   Result Value Ref Range    Urine Culture No other significant growth     Urine Culture 10,000 - 25,000 colonies/ml Yeast species (A)    Troponin I    Collection Time: 03/18/24  1:32 AM   Result Value Ref Range    Troponin-I 0.045 0.000 - 0.045 ng/mL   Comprehensive metabolic panel    Collection Time: 03/18/24  5:33 AM   Result Value Ref Range    Sodium Level 137 136 - 145 mmol/L    Potassium Level 3.3 (L) 3.5 - 5.1 mmol/L    Chloride 99 98 - 107 mmol/L    Carbon Dioxide 28 23 - 31 mmol/L    Glucose Level 99 82 - 115 mg/dL    Blood Urea Nitrogen 16.3 8.4 - 25.7 mg/dL    Creatinine 0.87 0.73 - 1.18 mg/dL    Calcium Level Total 7.4 (L) 8.8 - 10.0 mg/dL    Protein Total 5.2 (L) 5.8 - 7.6 gm/dL    Albumin Level 2.0 (L) 3.4 - 4.8 g/dL    Globulin 3.2 2.4 - 3.5 gm/dL    Albumin/Globulin Ratio 0.6 (L) 1.1 - 2.0 ratio    Bilirubin Total 0.8 <=1.5 mg/dL    Alkaline Phosphatase 73 40 - 150 unit/L    Alanine Aminotransferase 21 0 - 55 unit/L    Aspartate Aminotransferase 67 (H) 5 - 34 unit/L    eGFR >60 mls/min/1.73/m2   Troponin I    Collection Time: 03/18/24  5:33 AM   Result Value Ref Range    Troponin-I 0.055 (H) 0.000 - 0.045 ng/mL   CBC with Differential    Collection Time: 03/18/24  5:33 AM   Result Value Ref Range    WBC 11.97 (H) 4.50 - 11.50 x10(3)/mcL    RBC 3.40 (L) 4.70 - 6.10 x10(6)/mcL    Hgb 10.5 (L) 14.0 - 18.0 g/dL    Hct 32.4 (L) 42.0 - 52.0 %    MCV 95.3 (H) 80.0 - 94.0 fL    MCH 30.9 27.0 - 31.0 pg    MCHC 32.4 (L) 33.0 - 36.0 g/dL    RDW 15.8 11.5 - 17.0 %    Platelet 136 130 - 400 x10(3)/mcL    MPV 12.0 (H) 7.4 - 10.4 fL    NRBC% 0.0 %    IPF 12.9 (H) 0.9 - 11.2 %   Manual Differential    Collection Time: 03/18/24  5:33 AM   Result Value Ref Range    WBC 11.97 x10(3)/mcL    Neutrophils % 95 %    Lymphs % 4 %    Monocytes % 1 %    Neutrophils Abs 11.3715 (H) 2.1 - 9.2 x10(3)/mcL    Lymphs  Abs 0.4788 (L) 0.6 - 4.6 x10(3)/mcL    Monocytes Abs 0.1197 0.1 - 1.3 x10(3)/mcL    Platelets Decreased (A) Normal, Adequate    RBC Morph Abnormal (A) Normal    Poikilocytosis 1+ (A) (none)    Macrocytosis 1+ (A) (none)    Yoel Cells 1+ (A) (none)    Giant Platelets 1+     Vacuolated Grans 1+ (A) (none)   Anaerobic Culture    Collection Time: 03/18/24  3:02 PM    Specimen: Scrotum; Abscess   Result Value Ref Range    Anaerobe Culture No Anaerobes Isolated    Wound Culture    Collection Time: 03/18/24  3:02 PM    Specimen: Scrotum; Abscess   Result Value Ref Range    Wound Culture Many Escherichia coli (A)     Wound Culture Many Streptococcus agalactiae (Group B) (A)    Blood Culture    Collection Time: 03/18/24 10:26 PM    Specimen: Blood, Venous   Result Value Ref Range    CULTURE, BLOOD ([facility]) No Growth At 24 Hours    Phosphorus    Collection Time: 03/18/24 10:26 PM   Result Value Ref Range    Phosphorus Level 3.2 2.3 - 4.7 mg/dL   Magnesium    Collection Time: 03/18/24 10:26 PM   Result Value Ref Range    Magnesium Level 2.20 1.60 - 2.60 mg/dL   Comprehensive Metabolic Panel    Collection Time: 03/18/24 10:26 PM   Result Value Ref Range    Sodium Level 133 (L) 136 - 145 mmol/L    Potassium Level 3.3 (L) 3.5 - 5.1 mmol/L    Chloride 99 98 - 107 mmol/L    Carbon Dioxide 18 (L) 23 - 31 mmol/L    Glucose Level 159 (H) 82 - 115 mg/dL    Blood Urea Nitrogen 16.2 8.4 - 25.7 mg/dL    Creatinine 0.94 0.73 - 1.18 mg/dL    Calcium Level Total 7.4 (L) 8.8 - 10.0 mg/dL    Protein Total 6.7 5.8 - 7.6 gm/dL    Albumin Level 2.3 (L) 3.4 - 4.8 g/dL    Globulin 4.4 (H) 2.4 - 3.5 gm/dL    Albumin/Globulin Ratio 0.5 (L) 1.1 - 2.0 ratio    Bilirubin Total 0.8 <=1.5 mg/dL    Alkaline Phosphatase 67 40 - 150 unit/L    Alanine Aminotransferase 16 0 - 55 unit/L    Aspartate Aminotransferase 64 (H) 5 - 34 unit/L    eGFR >60 mls/min/1.73/m2   CBC with Differential    Collection Time: 03/18/24 10:26 PM   Result Value Ref Range    WBC 11.77  (H) 4.50 - 11.50 x10(3)/mcL    RBC 2.56 (L) 4.70 - 6.10 x10(6)/mcL    Hgb 8.2 (L) 14.0 - 18.0 g/dL    Hct 25.1 (L) 42.0 - 52.0 %    MCV 98.0 (H) 80.0 - 94.0 fL    MCH 32.0 (H) 27.0 - 31.0 pg    MCHC 32.7 (L) 33.0 - 36.0 g/dL    RDW 16.4 11.5 - 17.0 %    Platelet 117 (L) 130 - 400 x10(3)/mcL    MPV 13.2 (H) 7.4 - 10.4 fL    Neut % 84.9 %    Lymph % 5.2 %    Mono % 8.2 %    Eos % 0.0 %    Basophil % 0.6 %    Lymph # 0.61 0.6 - 4.6 x10(3)/mcL    Neut # 10.00 (H) 2.1 - 9.2 x10(3)/mcL    Mono # 0.96 0.1 - 1.3 x10(3)/mcL    Eos # 0.00 0 - 0.9 x10(3)/mcL    Baso # 0.07 <=0.2 x10(3)/mcL    IG# 0.13 (H) 0 - 0.04 x10(3)/mcL    IG% 1.1 %    NRBC% 0.0 %    IPF 13.9 (H) 0.9 - 11.2 %   Lactic Acid, Plasma    Collection Time: 03/18/24 10:26 PM   Result Value Ref Range    Lactic Acid Level 3.7 (HH) 0.5 - 2.2 mmol/L   Ferritin    Collection Time: 03/18/24 10:26 PM   Result Value Ref Range    Ferritin Level 1,101.88 (H) 21.81 - 274.66 ng/mL   Vitamin B12    Collection Time: 03/18/24 10:26 PM   Result Value Ref Range    Vitamin B12 Level 1,078 (H) 213 - 816 pg/mL   Folate    Collection Time: 03/18/24 10:26 PM   Result Value Ref Range    Folate Level 10.0 7.0 - 31.4 ng/mL   Iron and TIBC    Collection Time: 03/18/24 10:26 PM   Result Value Ref Range    Iron Binding Capacity Unsaturated 124 69 - 240 ug/dL    Iron Level 27 (L) 65 - 175 ug/dL    Transferrin 96 (L) 163 - 344 mg/dL    Iron Binding Capacity Total 151 (L) 250 - 450 ug/dL    Iron Saturation 18 (L) 20 - 50 %   Reticulocytes    Collection Time: 03/18/24 10:26 PM   Result Value Ref Range    Retic Cnt Auto 1.14 1.1 - 2.1 %    RET# 0.0292 0.026 - 0.095 x10e6/uL   Lipid Panel    Collection Time: 03/18/24 10:26 PM   Result Value Ref Range    Cholesterol Total 46 <=200 mg/dL    HDL Cholesterol <5 (L) 35 - 60 mg/dL    Triglyceride 1,569 (H) 34 - 140 mg/dL    Cholesterol/HDL Ratio     Lactate Dehydrogenase    Collection Time: 03/18/24 10:26 PM   Result Value Ref Range    Lactate  Dehydrogenase 269 (H) 125 - 220 U/L   Haptoglobin    Collection Time: 03/18/24 10:26 PM   Result Value Ref Range    Haptoglobin 347 40 - 368 mg/dL   T4, Free    Collection Time: 03/18/24 10:26 PM   Result Value Ref Range    Thyroxine Free 1.30 0.70 - 1.48 ng/dL   TSH    Collection Time: 03/18/24 10:26 PM   Result Value Ref Range    TSH 1.097 0.350 - 4.940 uIU/mL   Hemoglobin A1C    Collection Time: 03/18/24 10:26 PM   Result Value Ref Range    Hemoglobin A1c 5.0 <=7.0 %    Estimated Average Glucose 96.8 mg/dL   PTH, Intact    Collection Time: 03/18/24 10:26 PM   Result Value Ref Range    Parathyroid Hormone Intact 60.0 8.7 - 77.0 pg/mL   Troponin I    Collection Time: 03/18/24 10:26 PM   Result Value Ref Range    Troponin-I 0.058 (H) 0.000 - 0.045 ng/mL   CK    Collection Time: 03/18/24 10:26 PM   Result Value Ref Range    Creatine Kinase 95 30 - 200 U/L   CK-MB    Collection Time: 03/18/24 10:26 PM   Result Value Ref Range    Creatine Kinase MB 2.0 <=7.2 ng/mL   RT Blood Gas    Collection Time: 03/18/24 10:42 PM   Result Value Ref Range    Sample Type Arterial Blood     Sample site Right Radial Artery     Drawn by TDL RRT     pH, Blood gas 7.350 7.350 - 7.450    pCO2, Blood gas 48.0 (H) 35.0 - 45.0 mmHg    pO2, Blood gas 114.0 (H) 80.0 - 100.0 mmHg    Sodium, Blood Gas 134 (L) 137 - 145 mmol/L    Potassium, Blood Gas 2.9 (L) 3.5 - 5.0 mmol/L    Calcium Level Ionized 1.05 (L) 1.12 - 1.23 mmol/L    TOC2, Blood gas 28.0 mmol/L    Base Excess, Blood gas 0.60 -2.00 - 2.00 mmol/L    sO2, Blood gas 98.2 %    HCO3, Blood gas 26.5 (H) 22.0 - 26.0 mmol/L    THb, Blood gas 9.0 (L) 12 - 16 g/dL    O2 Hb, Blood Gas 97.3 (H) 94.0 - 97.0 %    CO Hgb 2.4 (H) 0.5 - 1.5 %    Met Hgb 0.3 (L) 0.4 - 1.5 %    Allens Test Yes     MODE SIMV     FIO2, Blood gas 50 %    Mech Vt 500 ml    Mech RR 18 b/min    PEEP 5.0 cmH2O    PS 10.0 cmH2O   SYPHILIS ANTIBODY (WITH REFLEX RPR)    Collection Time: 03/19/24 12:20 AM   Result Value Ref Range     Syphilis Antibody Nonreactive Nonreactive, Equivocal   Comprehensive metabolic panel    Collection Time: 03/19/24 12:20 AM   Result Value Ref Range    Sodium Level 129 (L) 136 - 145 mmol/L    Potassium Level 3.0 (L) 3.5 - 5.1 mmol/L    Chloride 95 (L) 98 - 107 mmol/L    Carbon Dioxide 18 (L) 23 - 31 mmol/L    Glucose Level 515 (HH) 82 - 115 mg/dL    Blood Urea Nitrogen 18.9 8.4 - 25.7 mg/dL    Creatinine 1.16 0.73 - 1.18 mg/dL    Calcium Level Total 7.7 (L) 8.8 - 10.0 mg/dL    Protein Total 5.7 (L) 5.8 - 7.6 gm/dL    Albumin Level 2.3 (L) 3.4 - 4.8 g/dL    Globulin 3.4 2.4 - 3.5 gm/dL    Albumin/Globulin Ratio 0.7 (L) 1.1 - 2.0 ratio    Bilirubin Total 0.8 <=1.5 mg/dL    Alkaline Phosphatase 75 40 - 150 unit/L    Alanine Aminotransferase 15 0 - 55 unit/L    Aspartate Aminotransferase 32 5 - 34 unit/L    eGFR >60 mls/min/1.73/m2   Phosphorus    Collection Time: 03/19/24 12:20 AM   Result Value Ref Range    Phosphorus Level 3.2 2.3 - 4.7 mg/dL   Magnesium    Collection Time: 03/19/24 12:20 AM   Result Value Ref Range    Magnesium Level 2.30 1.60 - 2.60 mg/dL   Lipase    Collection Time: 03/19/24 12:20 AM   Result Value Ref Range    Lipase Level 5 <=60 U/L   Amylase    Collection Time: 03/19/24 12:20 AM   Result Value Ref Range    Amylase Level 35 25 - 125 unit/L   Beta-Hydroxybutyrate, Serum    Collection Time: 03/19/24 12:20 AM   Result Value Ref Range    Beta Hydroxybutyrate 1.00 (H) <=0.30 mmol/L   CBC with Differential    Collection Time: 03/19/24 12:21 AM   Result Value Ref Range    WBC 9.42 4.50 - 11.50 x10(3)/mcL    RBC 2.82 (L) 4.70 - 6.10 x10(6)/mcL    Hgb 8.9 (L) 14.0 - 18.0 g/dL    Hct 26.9 (L) 42.0 - 52.0 %    MCV 95.4 (H) 80.0 - 94.0 fL    MCH 31.6 (H) 27.0 - 31.0 pg    MCHC 33.1 33.0 - 36.0 g/dL    RDW 16.1 11.5 - 17.0 %    Platelet 134 130 - 400 x10(3)/mcL    MPV 11.9 (H) 7.4 - 10.4 fL    NRBC% 0.0 %    IPF 10.9 0.9 - 11.2 %   Manual Differential    Collection Time: 03/19/24 12:21 AM   Result Value Ref  Range    WBC 9.42 x10(3)/mcL    Neutrophils % 92 %    Lymphs % 4 %    Monocytes % 4 %    Neutrophils Abs 8.6664 2.1 - 9.2 x10(3)/mcL    Lymphs Abs 0.3768 (L) 0.6 - 4.6 x10(3)/mcL    Monocytes Abs 0.3768 0.1 - 1.3 x10(3)/mcL    Platelets Normal Normal, Adequate    RBC Morph Abnormal (A) Normal    Anisocytosis 1+ (A) (none)    Macrocytosis 1+ (A) (none)   HIV 1/2 Ag/Ab (4th Gen)    Collection Time: 03/19/24 12:29 AM   Result Value Ref Range    HIV Nonreactive Nonreactive   Hepatitis Panel, Acute    Collection Time: 03/19/24 12:29 AM   Result Value Ref Range    Hepatitis A IgM Nonreactive Nonreactive    Hepatitis B Core IgM Nonreactive Nonreactive    Hepatitis B Surface Antigen Nonreactive Nonreactive    Hep C Ab Interp Nonreactive Nonreactive   Pathologist Interpretation    Collection Time: 03/19/24 12:29 AM   Result Value Ref Range    Pathology Review       No serological evidence of recent or past hepatitis A, B, or C infection.    Mateo Burden M.D.    Echo Saline Bubble? Yes    Collection Time: 03/19/24 12:29 AM   Result Value Ref Range    Quintana's Biplane MOD Ejection Fraction 39 %    LVOT stroke volume 46.73 cm3    LVIDd 5.61 3.5 - 6.0 cm    LV Systolic Volume 98.80 mL    LVIDs 4.63 (A) 2.1 - 4.0 cm    LV Diastolic Volume 154.00 mL    IVS 1.16 (A) 0.6 - 1.1 cm    LVOT diameter 2.20 cm    LVOT area 3.8 cm2    FS 17 (A) 28 - 44 %    Left Ventricle Relative Wall Thickness 0.42 cm    Posterior Wall 1.19 (A) 0.6 - 1.1 cm    LV mass 273.33 g    MV Peak E Stuart 1.18 m/s    TDI LATERAL 0.10 m/s    TDI SEPTAL 0.07 m/s    E/E' ratio 13.88 m/s    MV Peak A Stuart 0.25 m/s    TR Max Stuart 2.38 m/s    E/A ratio 4.72     E wave deceleration time 209.00 msec    LV SEPTAL E/E' RATIO 16.86 m/s    LV LATERAL E/E' RATIO 11.80 m/s    LVOT peak stuart 0.74 m/s    Left Ventricular Outflow Tract Mean Velocity 0.45 cm/s    Left Ventricular Outflow Tract Mean Gradient 1.00 mmHg    RV mid diameter 3.60 cm    RV S' 11.00 cm/s    TAPSE 2.28 cm     LA size 4.90 cm    LA volume (mod) 117.00 cm3    RA Major Axis 6.54 cm    RA Width 6.18 cm    AV mean gradient 3 mmHg    AV peak gradient 7 mmHg    Ao peak trevon 1.28 m/s    Ao VTI 21.70 cm    LVOT peak VTI 12.30 cm    AV valve area 2.15 cm²    AV Velocity Ratio 0.58     AV index (prosthetic) 0.57     HODAN by Velocity Ratio 2.20 cm²    Mr max trevon 4.63 m/s    MV mean gradient 3 mmHg    MV peak gradient 5 mmHg    MV stenosis pressure 1/2 time 45.00 ms    MV valve area p 1/2 method 4.89 cm2    MV valve area by continuity eq 2.45 cm2    MV VTI 19.1 cm    Triscuspid Valve Regurgitation Peak Gradient 23 mmHg    PV PEAK VELOCITY 1.25 m/s    PV peak gradient 6 mmHg    Mean e' 0.09 m/s   Drug Screen, Urine    Collection Time: 03/19/24  1:28 AM   Result Value Ref Range    Amphetamines, Urine Negative Negative    Barbituates, Urine Negative Negative    Benzodiazepine, Urine Negative Negative    Cannabinoids, Urine Negative Negative    Cocaine, Urine Negative Negative    Fentanyl, Urine Positive (A) Negative    MDMA, Urine Negative Negative    Opiates, Urine Positive (A) Negative    Phencyclidine, Urine Negative Negative    pH, Urine 6.0 3.0 - 11.0    Specific Gravity, Urine Auto 1.027 1.001 - 1.035   Urinalysis, Reflex to Urine Culture    Collection Time: 03/19/24  1:28 AM    Specimen: Urine   Result Value Ref Range    Color, UA Yellow Yellow, Light-Yellow, Colorless, Straw, Dark-Yellow    Appearance, UA Clear Clear    Specific Gravity, UA 1.027 1.005 - 1.030    pH, UA 6.0 5.0 - 8.5    Protein, UA 1+ (A) Negative    Glucose, UA 3+ (A) Negative, Normal    Ketones, UA 1+ (A) Negative    Blood, UA 1+ (A) Negative    Bilirubin, UA Negative Negative    Urobilinogen, UA Normal 0.2, 1.0, Normal    Nitrites, UA Negative Negative    Leukocyte Esterase,  (A) Negative    WBC, UA 51-99 (A) None Seen, 0-2, 3-5, 0-5 /HPF    Bacteria, UA Trace None Seen, Trace /HPF    Squamous Epithelial Cells, UA Trace None Seen /HPF    Mucous, UA  Trace (A) None Seen /LPF    RBC, UA 0-5 None Seen, 0-2, 3-5, 0-5 /HPF   Chlamydia/GC, PCR    Collection Time: 03/19/24  1:28 AM    Specimen: Urine   Result Value Ref Range    Chlamydia trachomatis PCR Not Detected Not Detected    N. gonorrhea PCR Not Detected Not Detected    Source Urine    POCT glucose    Collection Time: 03/19/24  2:04 AM   Result Value Ref Range    POCT Glucose 239 (H) 70 - 110 mg/dL   MRSA PCR    Collection Time: 03/19/24  2:13 AM   Result Value Ref Range    MRSA PCR [facility]CRN ([facility]) Not Detected Not Detected   Respiratory Panel    Collection Time: 03/19/24  2:13 AM   Result Value Ref Range    Adenovirus Not Detected Not Detected    Coronavirus 229E Not Detected Not Detected    Coronavirus HKU1 Not Detected Not Detected    Coronavirus NL63 Not Detected Not Detected    Coronavirus OC43 PCR, Common Cold Virus Not Detected Not Detected    Human Metapneumovirus Detected (A) Not Detected    Parainfluenza Virus 1 Not Detected Not Detected    Parainfluenza Virus 2 Not Detected Not Detected    Parainfluenza Virus 3 Not Detected Not Detected    Parainfluenza Virus 4 Not Detected Not Detected    Bordetella pertussis (ptxP) Not Detected Not Detected    Chlamydia pneumoniae Not Detected Not Detected    Mycoplasma pneumoniae Not Detected Not Detected    Human Rhinovirus/Enterovirus Not Detected Not Detected    Bordetella parapertussis ([id number]) Not Detected Not Detected   COVID/RSV/FLU A&B PCR    Collection Time: 03/19/24  2:13 AM   Result Value Ref Range    Influenza A PCR Not Detected Not Detected    Influenza B PCR Not Detected Not Detected    Respiratory Syncytial Virus PCR Not Detected Not Detected    SARS-CoV-2 PCR Not Detected Not Detected, Negative   Comprehensive metabolic panel    Collection Time: 03/19/24  5:37 AM   Result Value Ref Range    Sodium Level 137 136 - 145 mmol/L    Potassium Level 4.2 3.5 - 5.1 mmol/L    Chloride 101 98 - 107 mmol/L    Carbon Dioxide 25 23 - 31 mmol/L    Glucose Level 217  (H) 82 - 115 mg/dL    Blood Urea Nitrogen 15.3 8.4 - 25.7 mg/dL    Creatinine 0.83 0.73 - 1.18 mg/dL    Calcium Level Total 8.1 (L) 8.8 - 10.0 mg/dL    Protein Total 5.2 (L) 5.8 - 7.6 gm/dL    Albumin Level 2.3 (L) 3.4 - 4.8 g/dL    Globulin 2.9 2.4 - 3.5 gm/dL    Albumin/Globulin Ratio 0.8 (L) 1.1 - 2.0 ratio    Bilirubin Total 0.5 <=1.5 mg/dL    Alkaline Phosphatase 61 40 - 150 unit/L    Alanine Aminotransferase 16 0 - 55 unit/L    Aspartate Aminotransferase 28 5 - 34 unit/L    eGFR >60 mls/min/1.73/m2   Magnesium    Collection Time: 03/19/24  5:37 AM   Result Value Ref Range    Magnesium Level 2.40 1.60 - 2.60 mg/dL   Lactic Acid, Plasma    Collection Time: 03/19/24  5:37 AM   Result Value Ref Range    Lactic Acid Level 1.3 0.5 - 2.2 mmol/L   Phosphorus    Collection Time: 03/19/24  5:37 AM   Result Value Ref Range    Phosphorus Level 3.3 2.3 - 4.7 mg/dL   CBC with Differential    Collection Time: 03/19/24  5:37 AM   Result Value Ref Range    WBC 9.93 4.50 - 11.50 x10(3)/mcL    RBC 2.88 (L) 4.70 - 6.10 x10(6)/mcL    Hgb 8.7 (L) 14.0 - 18.0 g/dL    Hct 27.5 (L) 42.0 - 52.0 %    MCV 95.5 (H) 80.0 - 94.0 fL    MCH 30.2 27.0 - 31.0 pg    MCHC 31.6 (L) 33.0 - 36.0 g/dL    RDW 16.1 11.5 - 17.0 %    Platelet 151 130 - 400 x10(3)/mcL    MPV 11.9 (H) 7.4 - 10.4 fL    NRBC% 0.0 %   Triglycerides    Collection Time: 03/19/24  5:37 AM   Result Value Ref Range    Triglyceride 81 34 - 140 mg/dL   Manual Differential    Collection Time: 03/19/24  5:37 AM   Result Value Ref Range    WBC 9.93 x10(3)/mcL    Neutrophils % 88 %    Lymphs % 5 %    Monocytes % 6 %    Metamyelocytes % 1 (H) <=0 %    Neutrophils Abs 8.7384 2.1 - 9.2 x10(3)/mcL    Lymphs Abs 0.4965 (L) 0.6 - 4.6 x10(3)/mcL    Monocytes Abs 0.5958 0.1 - 1.3 x10(3)/mcL    Platelets Normal Normal, Adequate    RBC Morph Abnormal (A) Normal    Poikilocytosis 1+ (A) (none)    Anisocytosis 1+ (A) (none)    Macrocytosis 1+ (A) (none)    Giant Platelets 1+    RT Blood Gas     Collection Time: 03/19/24  5:40 AM   Result Value Ref Range    Sample Type Arterial Blood     Sample site Left Radial Artery     Drawn by sd rrt     pH, Blood gas 7.440 7.350 - 7.450    pCO2, Blood gas 45.0 35.0 - 45.0 mmHg    pO2, Blood gas 96.0 80.0 - 100.0 mmHg    Sodium, Blood Gas 134 (L) 137 - 145 mmol/L    Potassium, Blood Gas 3.7 3.5 - 5.0 mmol/L    Calcium Level Ionized 1.11 (L) 1.12 - 1.23 mmol/L    TOC2, Blood gas 32.0 mmol/L    Base Excess, Blood gas 5.60 mmol/L    sO2, Blood gas 98.0 %    HCO3, Blood gas 30.6 (H) 22.0 - 26.0 mmol/L    Allens Test Yes     MODE SIMV     Oxygen Device, Blood gas Ventilator     FIO2, Blood gas 30 %    Mech Vt 500 ml    Mech RR 18 b/min    PEEP 5.0 cmH2O    PS 10.0 cmH2O   EKG 12-lead    Collection Time: 03/19/24  7:25 AM   Result Value Ref Range    QRS Duration 98 ms    [facility] QTC Calculation 573 ms   VANCOMYCIN, TROUGH    Collection Time: 03/19/24 10:29 AM   Result Value Ref Range    Vancomycin Trough 10.0 (L) 15.0 - 20.0 ug/ml   Triglycerides    Collection Time: 03/19/24 10:29 AM   Result Value Ref Range    Triglyceride 85 34 - 140 mg/dL   Lactic Acid, Plasma    Collection Time: 03/19/24 10:29 AM   Result Value Ref Range    Lactic Acid Level 0.9 0.5 - 2.2 mmol/L   POCT glucose    Collection Time: 03/19/24 10:55 AM   Result Value Ref Range    POCT Glucose 202 (H) 70 - 110 mg/dL   POCT glucose    Collection Time: 03/19/24  4:07 PM   Result Value Ref Range    POCT Glucose 182 (H) 70 - 110 mg/dL   Magnesium    Collection Time: 03/19/24  5:42 PM   Result Value Ref Range    Magnesium Level 2.30 1.60 - 2.60 mg/dL   Phosphorus    Collection Time: 03/19/24  5:42 PM   Result Value Ref Range    Phosphorus Level 2.2 (L) 2.3 - 4.7 mg/dL   Basic Metabolic Panel    Collection Time: 03/19/24  5:42 PM   Result Value Ref Range    Sodium Level 137 136 - 145 mmol/L    Potassium Level 3.4 (L) 3.5 - 5.1 mmol/L    Chloride 101 98 - 107 mmol/L    Carbon Dioxide 24 23 - 31 mmol/L    Glucose Level 157  (H) 82 - 115 mg/dL    Blood Urea Nitrogen 16.2 8.4 - 25.7 mg/dL    Creatinine 0.81 0.73 - 1.18 mg/dL    BUN/Creatinine Ratio 20     Calcium Level Total 7.6 (L) 8.8 - 10.0 mg/dL    Anion Gap 12.0 mEq/L    eGFR >60 mls/min/1.73/m2   POCT glucose    Collection Time: 03/19/24  9:26 PM   Result Value Ref Range    POCT Glucose 154 (H) 70 - 110 mg/dL   Troponin I    Collection Time: 03/20/24  2:06 AM   Result Value Ref Range    Troponin-I 0.040 0.000 - 0.045 ng/mL   Lactic Acid, Plasma    Collection Time: 03/20/24  2:06 AM   Result Value Ref Range    Lactic Acid Level 1.8 0.5 - 2.2 mmol/L   Phosphorus    Collection Time: 03/20/24  2:06 AM   Result Value Ref Range    Phosphorus Level 2.8 2.3 - 4.7 mg/dL   Magnesium    Collection Time: 03/20/24  2:06 AM   Result Value Ref Range    Magnesium Level 2.30 1.60 - 2.60 mg/dL   Comprehensive metabolic panel    Collection Time: 03/20/24  2:06 AM   Result Value Ref Range    Sodium Level 136 136 - 145 mmol/L    Potassium Level 3.7 3.5 - 5.1 mmol/L    Chloride 101 98 - 107 mmol/L    Carbon Dioxide 25 23 - 31 mmol/L    Glucose Level 127 (H) 82 - 115 mg/dL    Blood Urea Nitrogen 15.2 8.4 - 25.7 mg/dL    Creatinine 0.84 0.73 - 1.18 mg/dL    Calcium Level Total 7.4 (L) 8.8 - 10.0 mg/dL    Protein Total 4.8 (L) 5.8 - 7.6 gm/dL    Albumin Level 2.0 (L) 3.4 - 4.8 g/dL    Globulin 2.8 2.4 - 3.5 gm/dL    Albumin/Globulin Ratio 0.7 (L) 1.1 - 2.0 ratio    Bilirubin Total 0.4 <=1.5 mg/dL    Alkaline Phosphatase 55 40 - 150 unit/L    Alanine Aminotransferase 12 0 - 55 unit/L    Aspartate Aminotransferase 17 5 - 34 unit/L    eGFR >60 mls/min/1.73/m2   Triglycerides    Collection Time: 03/20/24  2:06 AM   Result Value Ref Range    Triglyceride 102 34 - 140 mg/dL   CBC with Differential    Collection Time: 03/20/24  2:06 AM   Result Value Ref Range    WBC 12.28 (H) 4.50 - 11.50 x10(3)/mcL    RBC 2.72 (L) 4.70 - 6.10 x10(6)/mcL    Hgb 8.5 (L) 14.0 - 18.0 g/dL    Hct 25.7 (L) 42.0 - 52.0 %    MCV 94.5  (H) 80.0 - 94.0 fL    MCH 31.3 (H) 27.0 - 31.0 pg    MCHC 33.1 33.0 - 36.0 g/dL    RDW 16.3 11.5 - 17.0 %    Platelet 154 130 - 400 x10(3)/mcL    MPV 11.4 (H) 7.4 - 10.4 fL    NRBC% 0.0 %   Manual Differential    Collection Time: 03/20/24  2:06 AM   Result Value Ref Range    WBC 12.28 x10(3)/mcL    Neutrophils % 77 %    Lymphs % 13 %    Monocytes % 8 %    Promyelocytes % 1 (H) <=0 %    Plasmacytes % 1 %    nRBC % 1 %    Neutrophils Abs 9.4556 (H) 2.1 - 9.2 x10(3)/mcL    Lymphs Abs 1.5964 0.6 - 4.6 x10(3)/mcL    Monocytes Abs 0.9824 0.1 - 1.3 x10(3)/mcL    Platelets Normal Normal, Adequate    RBC Morph Abnormal (A) Normal    Poikilocytosis 2+ (A) (none)    Anisocytosis 1+ (A) (none)    Macrocytosis 1+ (A) (none)    [place] Cells 1+ (A) (none)   RT Blood Gas    Collection Time: 03/20/24  5:39 AM   Result Value Ref Range    Sample Type Arterial Blood     Sample site Left Radial Artery     Drawn by sd rrt     pH, Blood gas 7.510 (H) 7.350 - 7.450    pCO2, Blood gas 41.0 35.0 - 45.0 mmHg    pO2, Blood gas 73.0 (L) 80.0 - 100.0 mmHg    Sodium, Blood Gas 134 (L) 137 - 145 mmol/L    Potassium, Blood Gas 3.5 3.5 - 5.0 mmol/L    Calcium Level Ionized 1.05 (L) 1.12 - 1.23 mmol/L    TOC2, Blood gas 34.0 mmol/L    Base Excess, Blood gas 8.80 mmol/L    sO2, Blood gas 96.0 %    HCO3, Blood gas 32.7 (H) 22.0 - 26.0 mmol/L    Allens Test Yes     MODE SIMV     Oxygen Device, Blood gas Ventilator     FIO2, Blood gas 30 %    Mech Vt 500 ml    Mech RR 18 b/min    PEEP 5.0 cmH2O    PS 10.0 cmH2O     Telemetry: PAF/CVR    Physical Exam  Vitals reviewed.   Constitutional:       General: He is not in acute distress.     Appearance: He is ill-appearing.   HENT:      Mouth/Throat:      Mouth: Mucous membranes are moist.   Eyes:      Conjunctiva/sclera: Conjunctivae normal.   Cardiovascular:      Rate and Rhythm: Normal rate. Rhythm irregular.      Heart sounds: Murmur heard.   Pulmonary:      Effort: Pulmonary effort is normal. No respiratory  distress.      Comments: Ventilator Associated Breath Sounds  Vent Mode: SIMV  Oxygen Concentration (%):  [30] 30  Resp Rate Total:  [17 br/min-21 br/min] 20 br/min  Vt Set:  [500 mL] 500 mL  PEEP/CPAP:  [5 cmH20] 5 cmH20  Pressure Support:  [10 cmH20] 10 cmH20  Mean Airway Pressure:  [8 cmH20-9 cmH20] 9 cmH20  Genitourinary:     Comments: Scrotal Sling in Place, Dressing C/D/I  Musculoskeletal:      Right lower leg: No edema.      Left lower leg: No edema.       Current Inpatient Medications:    Current Facility-Administered Medications:     acetaminophen tablet 650 mg, 650 mg, Oral, Q8H PRN, Harris Hernandez MD    amiodarone 360 mg/200 mL (1.8 mg/mL) infusion, 0.5 mg/min, Intravenous, Continuous, David Gonsalez MD, Last Rate: 16.7 mL/hr at 03/20/24 0546, 0.5 mg/min at 03/20/24 0546    atorvastatin tablet 80 mg, 80 mg, Oral, Daily, David Gonsalez MD, 80 mg at 03/20/24 0919    chlorhexidine 0.12 % solution 15 mL, 15 mL, Mouth/Throat, BID, David Gonsalez MD, 15 mL at 03/20/24 0919    dexmedetomidine (PRECEDEX) 400mcg/100mL 0.9% NaCL infusion, 0-1.4 mcg/kg/hr, Intravenous, Continuous, David Gonsalez MD, Last Rate: 22.5 mL/hr at 03/20/24 0927, 1 mcg/kg/hr at 03/20/24 0927    dextrose 10 % infusion, , Intravenous, PRN, David Gonsalez MD    dextrose 10 % infusion, , Intravenous, PRN, David Gonsalez MD    dextrose 10% bolus 125 mL 125 mL, 12.5 g, Intravenous, PRNSunshine Andrew, MD    dextrose 10% bolus 250 mL 250 mL, 25 g, Intravenous, PRNSunshine Andrew, MD    dextrose 5 % and 0.45 % NaCl infusion, , Intravenous, Continuous PRN, David Gonsalez MD    DOBUtamine 1000 mg in D5W 250 mL infusion, 0-10 mcg/kg/min, Intravenous, Continuous, David Gonsalez MD, Last Rate: 3.4 mL/hr at 03/20/24 0506, 2.5 mcg/kg/min at 03/20/24 0506    fentanyl IV bolus from bag/infusion 50 mcg, 50 mcg, Intravenous, Q30 Min PRN, David Gonsalez MD    insulin aspart U-100 injection 0-10 Units, 0-10 Units,  Subcutaneous, Q6H PRN, Narendra Pradhan, DO    ipratropium 0.02 % nebulizer solution 0.5 mg, 0.5 mg, Nebulization, Q6H PRN, David Gonsalez MD    levalbuterol nebulizer solution 0.63 mg, 0.63 mg, Nebulization, Q6H PRN, David Gonsalez MD    melatonin tablet 6 mg, 6 mg, Oral, Nightly PRN, Harris Hernandez MD    midazolam (PF) in 0.9 % NaCl 1 mg/mL infusion, 0-5 mg/hr, Intravenous, Continuous, Cassidy Obrien MD, Last Rate: 2 mL/hr at 03/20/24 0506, 2 mg/hr at 03/20/24 0506    midazolam (VERSED) 1 mg/mL injection 2 mg, 2 mg, Intravenous, Once, David Gonsalez MD    morphine 4 mg/mL injection, , , ,     morphine injection 2 mg, 2 mg, Intravenous, Q6H PRN, David Gonsalez MD, 2 mg at 03/20/24 0857    NORepinephrine 8 mg in dextrose 5% 250 mL infusion, 0-3 mcg/kg/min, Intravenous, Continuous, David Gonsalez MD, Stopped at 03/19/24 1153    oxyCODONE immediate release tablet 5 mg, 5 mg, Oral, Q4H PRN, Harris Hernandez MD, 5 mg at 03/20/24 0919    piperacillin-tazobactam (ZOSYN) 4.5 g in dextrose 5 % in water (D5W) 100 mL IVPB (MB+), 4.5 g, Intravenous, Q8H, Harris Hernandez MD, Stopped at 03/20/24 1127    propofol (DIPRIVAN) 10 mg/mL infusion, 0-50 mcg/kg/min, Intravenous, Continuous, David Gonsalez MD, Held at 03/19/24 0715    sodium chloride 0.9% flush 10 mL, 10 mL, Intravenous, PRN, Narendra Pradhan, DO    sodium chloride 0.9% flush 10 mL, 10 mL, Intravenous, PRN, David Gonsalez MD    thiamine (B-1) 500 mg in dextrose 5 % (D5W) 100 mL IVPB, 500 mg, Intravenous, TID, David Gonsalez MD, Stopped at 03/20/24 1205    Pharmacy to dose Vancomycin consult, , , Once **AND** vancomycin - pharmacy to dose, , Intravenous, pharmacy to manage frequency, Harris Hernandez MD    vancomycin 1.25 g in dextrose 5% 250 mL IVPB (ready to mix), 1,250 mg, Intravenous, Q12H, Cassidy Obrien MD, Stopped at 03/20/24 1307    Facility-Administered Medications Ordered in Other Encounters:     0.9%  NaCl  infusion, , Intravenous, Continuous, Shannon Milligan MD, Last Rate: 10 mL/hr at 03/09/23 1020, New Bag at 03/09/23 1020    diphenhydrAMINE injection 25 mg, 25 mg, Intravenous, Once PRN, Shannon Milligan MD    LIDOcaine (PF) 10 mg/ml (1%) injection 10 mg, 1 mL, Intradermal, Once, Lanette Ulloa FNP    LIDOcaine (PF) 10 mg/ml (1%) injection 10 mg, 1 mL, Intradermal, Once, Shannon Milligan MD    ondansetron injection 4 mg, 4 mg, Intravenous, Once, Shannon Milligan MD    prochlorperazine injection Soln 5 mg, 5 mg, Intravenous, Once PRN, Shannon Milligan MD  VTE Risk Mitigation (From admission, onward)           Ordered     IP VTE LOW RISK PATIENT  Once         03/19/24 0422     Place sequential compression device  Until discontinued         03/18/24 0124                  Assessment:   Cardiac Arrest/VT (Post Operative Left Héctor Orchiectomy - in PACU 3.18.24)     - Requiring Multiple Defibrillations (x 3)    - On Amiodarone Infusion  Chronic Atrial Fibrillation- Now AF CVR (HR 50's-60s with Intermittent PVCS)    - Xarelto on Hold Post Scrotal Abscess I&D  NSTEMI (Unspecified for Now)  Hypotension Requiring Vasopressor Support    - History of Hypertension  Valvular Heart Disease    - MR: Moderate, TR: Mild to Moderate  Hyperlipidemia    - On Statin  Sepsis - Likely UTI Related   Scrotal Abscess    - Status Post Surgical Exploration, Left Orchiectomy, & Debridement of Wound/Wound Packing (3.18.24)   COPD  Long Term Oral Anticoagulation  Cardiomyopathy (Unspecified)    - EF 30-40%  CAD (Details Unclear)  PAD  Infrarenal Abdominal Aortic Aneurysmal Dilatation with Mural Thrombus (Stable)    - Maximum diameter is 3.6 cm without significant interval change   Anemia  Acute Human Metapneumovirus Infection (On Droplet Precautions)    Plan:   D/C IV Amiodarone and Start Oral Amiodarone Load: Amiodarone 400mg PO BID x 5 Days then 200mg PO BID x 5 Days then 200mg PO Daily there after   D/C Dobutamine   Keep  K > 4.0 and Mg > 2.0   Antibiotic Management as per Primary Team  Vent Management as per ICU Team  Consider Ischemic Evaluation once acute medical issues stabilize, and cleared for anticoagulation  Will Continue to Follow  Labs in AM: CBC, CMP and Mg    RAN Kim  Cardiology  [facility]  03/20/2024

## 2024-03-20 NOTE — PROGRESS NOTES
Pulmonary & Critical Care Medicine   Progress Note      Presenting History/HPI:  Patient is a 68-year-old male with past medical history of nonischemic cardiomyopathy, HFrEF (EF 41%), chronic atrial fibrillation on Xarelto, peripheral arterial disease, COPD, hypertension, large left hydrocele who presented to the emergency department on 03/18/2024 for what he thought to be rectal bleeding.  Patient eventually found that source was from posterior aspect of scrotum.  Patient has had persistent swelling in that region for at least the last couple of weeks.  In the emergency room patient was tachycardic and EKG showed AFib with RVR, blood pressure is were slightly on low side.  Patient did have leukocytosis of 15,000, hemoglobin had showed significant decrease from last check.  Doppler ultrasound could not rule out torsion.  Cultures were drawn and broad-spectrum antibiotics were started.  Urology was consulted and they made decision to proceed with emergent scrotal exploration.  Patient underwent surgery, seemed to tolerate well.  In the PACU, patient was persistently hypotensive with blood pressures staying approximately 80/50 despite fluid resuscitation.  Patient was placed on phenylephrine for vasopressor support and ICU was consulted for admission.       Hospital Course/Significant events:  3/17/24 - seen in ER at [facility] for scrotal pain and bleeding  3/18/24 - D, abscess removal/culture left joselito orchiectomy; subsequent hypootension and code         Interval History:  Patient with no acute issues overnight remains on vent      Scheduled Medications:    atorvastatin  80 mg Oral Daily    chlorhexidine  15 mL Mouth/Throat BID    midazolam  2 mg Intravenous Once    morphine        piperacillin-tazobactam (Zosyn) IV (PEDS and ADULTS) (extended infusion is not appropriate)  4.5 g Intravenous Q8H    thiamine (B-1) 500 mg in dextrose 5 % (D5W) 100 mL IVPB  500 mg Intravenous TID    vancomycin (VANCOCIN) IV (PEDS and  ADULTS)  1,250 mg Intravenous Q12H       PRN Medications:   acetaminophen, dextrose 10 % in water (D10W), dextrose 10 % in water (D10W), dextrose 10%, dextrose 10%, dextrose 5 % and 0.45 % NaCl, fentanyl, insulin aspart U-100, ipratropium, levalbuterol, melatonin, morphine, morphine, oxyCODONE, sodium chloride 0.9%, sodium chloride 0.9%, Pharmacy to dose Vancomycin consult **AND** vancomycin - pharmacy to dose      Infusions:     amiodarone in dextrose 5% 0.5 mg/min (03/20/24 0546)    dexmedeTOMIDine (Precedex) infusion (titrating) 1 mcg/kg/hr (03/20/24 0927)    dextrose 5 % and 0.45 % NaCl      DOBUTamine IV infusion (titrating) 2.5 mcg/kg/min (03/20/24 0506)    midazolam 2 mg/hr (03/20/24 0506)    NORepinephrine bitartrate-D5W Stopped (03/19/24 1153)    propofoL Stopped (03/19/24 0715)         Fluid Balance:     Intake/Output Summary (Last 24 hours) at 3/20/2024 0936  Last data filed at 3/20/2024 0800  Gross per 24 hour   Intake 3927.26 ml   Output 1165 ml   Net 2762.26 ml         Vital Signs:   Vitals:    03/20/24 0919   BP:    Pulse:    Resp: (!) 30   Temp:          Physical Exam    -Sedated  -course right sided lung sounds, left with crackles  -s1s2  - N/T,N/D  - unable to acess     Ventilator Settings  Vent Mode: SIMV (03/20/24 0809)  Set Rate: 18 BPM (03/20/24 0809)  Vt Set: 500 mL (03/20/24 0809)  Pressure Support: 10 cmH20 (03/20/24 0532)  PEEP/CPAP: 5 cmH20 (03/20/24 0809)  Oxygen Concentration (%): 30 (03/20/24 0809)  Peak Airway Pressure: 18 cmH20 (03/20/24 0809)  Total Ve: 10.6 L/m (03/20/24 0809)  F/VT Ratio<105 (RSBI): (!) 35.29 (03/20/24 0532)      Laboratory Studies:   Recent Labs   Lab 03/20/24  0539   PH 7.510*   PCO2 41.0   PO2 73.0*   HCO3 32.7*     Recent Labs   Lab 03/20/24  0206   WBC 12.28  12.28*   RBC 2.72*   HGB 8.5*   HCT 25.7*      MCV 94.5*   MCH 31.3*   MCHC 33.1     Recent Labs   Lab 03/20/24  0206   GLUCOSE 127*      K 3.7   CO2 25   BUN 15.2   CREATININE 0.84    CALCIUM 7.4*   MG 2.30         Microbiology Data:   Microbiology Results (last 7 days)       Procedure Component Value Units Date/Time    Blood Culture [[id number]]  (Normal) Collected: 03/18/24 2226    Order Status: Completed Specimen: Blood, Venous Updated: 03/19/24 2300     CULTURE, BLOOD ([facility]) No Growth At 24 Hours    Blood culture #2 **CANNOT BE ORDERED STAT** [[id number]]  (Normal) Collected: 03/17/24 2103    Order Status: Completed Specimen: Blood Updated: 03/19/24 2200     CULTURE, BLOOD ([facility]) No Growth At 48 Hours    Blood culture #1 **CANNOT BE ORDERED STAT** [[id number]]  (Normal) Collected: 03/17/24 2103    Order Status: Completed Specimen: Blood Updated: 03/19/24 2200     CULTURE, BLOOD ([facility]) No Growth At 48 Hours    Urine culture [[id number]]  (Abnormal) Collected: 03/18/24 0034    Order Status: Completed Specimen: Urine Updated: 03/19/24 1004     Urine Culture No other significant growth      10,000 - 25,000 colonies/ml Yeast species     Comment: We have not further evaluated these organisms because three or more species compatible with normal genital jt usually represents specimen contamination from the time of collection, rather than infection. If clinical circumstances warrant further   workup of these organisms, please contact the Microbiology dept at [phone]; otherwise please have the patient submit another specimen.       Wound Culture [[id number]] Collected: 03/18/24 1502    Order Status: Completed Specimen: Abscess from Scrotum Updated: 03/19/24 0633     Wound Culture No Growth At 24 Hours    Chlamydia/GC, PCR [[id number]] Collected: 03/19/24 0128    Order Status: Completed Specimen: Urine Updated: 03/19/24 0527     Chlamydia trachomatis PCR Not Detected     N. gonorrhea PCR Not Detected     Source Urine    Narrative:      The Xpert CT/NG test, performed on the GeneXpert system is a qualitative in vitro real-time polymerase chain reaction (PCR) test for the automated detected and  differentiation for genomic DNA from Chlamydia trachomatis (CT) and/or Neisseria gonorrhoeae (NG).    STD Urine (CT/GC/Trich) [[id number]] Collected: 03/19/24 0128    Order Status: Canceled Specimen: Urine Updated: 03/19/24 0138    Respiratory Culture [[id number]]     Order Status: Sent Specimen: Sputum     Anaerobic Culture [[id number]] Collected: 03/18/24 1502    Order Status: Sent Specimen: Abscess from Scrotum Updated: 03/18/24 1611              Imaging:   X-Ray Chest 1 View  EXAMINATION  XR CHEST 1 VIEW    CLINICAL HISTORY  Intubation;    TECHNIQUE  A total of 1 frontal image(s) of the chest.    COMPARISON  19 March 2024    FINDINGS  Lines/tubes/devices: Grossly unchanged positioning when allowing for differences in technique and patient rotation.    The cardiac silhouette and central vascular structures are unchanged.  The trachea is midline. Hazy ill-defined infiltrate is more pronounced at the right lower lung zone.  Left lung field remains relatively clear.  There is no enlarging pleural effusion or convincing pneumothorax.    Regional osseous structures and extrathoracic soft tissues are similar.    IMPRESSION  1. Mildly worsened ill-defined right lower lung infiltrate.  2. Remaining findings similar.    Electronically signed by: Ramana Mcleod  Date:    03/20/2024  Time:    07:45          Assessment and Plan    Assessment:  Scrotal abscess s/p I&D with left joselito orchiectomy 03/18/2024  Recent cardiac arrest w/ associated (03/18/2024)  Sepsis, likely from urinary tract infection req vasopressor support  Chronic atrial fibrillation, on anticoagulation with Xarelto  Partial bowel obstruction  HFrEF (EF 41%)  VHD  Type 2 NSTEMI  History of COPD   History of hypertension   History of hyperlipidemia             Plan:  Patient status post surgical exploration on orchiectomy overnight went into PEA arrest with return of spontaneous circulation approximately 4 minutes patient also had V-tach started on amnio, Mag  and defibrillation x2  -Targeted temperature management keep patient normothermic  - Hx of Alcohol Abuse,  DT prophylaxis Thiamine, and folic acid and CIWA protocol   -depressed EF dilated RV and RA, septic and cardiogenic shock improved off pressors remains on dobutamine, improved perfusion parameters, moderate mitral regurg  - Afib- Need to address need for anticoagulation when it is okay by Urology  - Cardiology Consult   -viral pneumonitis PCR positive for metapneumovirus, on vent lung protective ventilation, good oxygenation and ventilation  -SAT/SBT   -Good urine output stable creatinine  -Improved perfusion parameters lactate cleared end-organ perfusion stable  -no transaminitis  -status post I&D left joselito Orchiectomy  -abdominal distention with distended colon abdomen now soft likely ileus benefit from Reglan  -keep blood glucose less than 180 check TSH  -monitor and replace electrolytes                  DVT ppx/tx with lovenox  GI ppx with protonix  Keep HOB elevated > 30*          The patient remains at high risk of decompensation and death and will remain in ICU level care     32 min of critical care time was spent reviewing the patient's chart including medications, radiographs, labs, pertinent cultures and pathology data, other consultant notes/recomendations as well as titration of vasopressors, adjustment of mechanical ventilatory or NIPPV support, as well as discussion of goals of care with nursing staff, respiratory therapy at the bedside and with family at the bedside/via phone.        Cassidy Obrien MD  3/20/2024  Pulmonology/Critical Care

## 2024-03-21 LAB
ALBUMIN SERPL-MCNC: 2.1 G/DL (ref 3.4–4.8)
ALBUMIN/GLOB SERPL: 0.7 RATIO (ref 1.1–2)
ALP SERPL-CCNC: 64 UNIT/L (ref 40–150)
ALT SERPL-CCNC: 11 UNIT/L (ref 0–55)
AST SERPL-CCNC: 21 UNIT/L (ref 5–34)
BACTERIA SPEC ANAEROBE CULT: NORMAL
BACTERIA WND CULT: ABNORMAL
BACTERIA WND CULT: ABNORMAL
BASOPHILS # BLD AUTO: 0.07 X10(3)/MCL
BASOPHILS NFR BLD AUTO: 0.7 %
BILIRUB SERPL-MCNC: 0.5 MG/DL
BUN SERPL-MCNC: 10.6 MG/DL (ref 8.4–25.7)
CALCIUM SERPL-MCNC: 7.7 MG/DL (ref 8.8–10)
CHLORIDE SERPL-SCNC: 101 MMOL/L (ref 98–107)
CO2 SERPL-SCNC: 29 MMOL/L (ref 23–31)
COLOR STL: ABNORMAL
CONSISTENCY STL: ABNORMAL
CREAT SERPL-MCNC: 0.79 MG/DL (ref 0.73–1.18)
EOSINOPHIL # BLD AUTO: 0.03 X10(3)/MCL (ref 0–0.9)
EOSINOPHIL NFR BLD AUTO: 0.3 %
ERYTHROCYTE [DISTWIDTH] IN BLOOD BY AUTOMATED COUNT: 16 % (ref 11.5–17)
GFR SERPLBLD CREATININE-BSD FMLA CKD-EPI: >60 MLS/MIN/1.73/M2
GLOBULIN SER-MCNC: 2.9 GM/DL (ref 2.4–3.5)
GLUCOSE SERPL-MCNC: 104 MG/DL (ref 82–115)
HCT VFR BLD AUTO: 27.2 % (ref 42–52)
HEMOCCULT SP1 STL QL: POSITIVE
HGB BLD-MCNC: 8.6 G/DL (ref 14–18)
IMM GRANULOCYTES # BLD AUTO: 0.39 X10(3)/MCL (ref 0–0.04)
IMM GRANULOCYTES NFR BLD AUTO: 4 %
LYMPHOCYTES # BLD AUTO: 1.35 X10(3)/MCL (ref 0.6–4.6)
LYMPHOCYTES NFR BLD AUTO: 13.8 %
MAGNESIUM SERPL-MCNC: 2.2 MG/DL (ref 1.6–2.6)
MCH RBC QN AUTO: 30.7 PG (ref 27–31)
MCHC RBC AUTO-ENTMCNC: 31.6 G/DL (ref 33–36)
MCV RBC AUTO: 97.1 FL (ref 80–94)
MONOCYTES # BLD AUTO: 0.84 X10(3)/MCL (ref 0.1–1.3)
MONOCYTES NFR BLD AUTO: 8.6 %
NEUTROPHILS # BLD AUTO: 7.07 X10(3)/MCL (ref 2.1–9.2)
NEUTROPHILS NFR BLD AUTO: 72.6 %
NRBC BLD AUTO-RTO: 0.2 %
PHOSPHATE SERPL-MCNC: 1.7 MG/DL (ref 2.3–4.7)
PLATELET # BLD AUTO: 199 X10(3)/MCL (ref 130–400)
PMV BLD AUTO: 11.1 FL (ref 7.4–10.4)
POTASSIUM SERPL-SCNC: 3.4 MMOL/L (ref 3.5–5.1)
PROT SERPL-MCNC: 5 GM/DL (ref 5.8–7.6)
RBC # BLD AUTO: 2.8 X10(6)/MCL (ref 4.7–6.1)
SODIUM SERPL-SCNC: 140 MMOL/L (ref 136–145)
TRIGL SERPL-MCNC: 104 MG/DL (ref 34–140)
VANCOMYCIN TROUGH SERPL-MCNC: 24.3 UG/ML (ref 15–20)
WBC # SPEC AUTO: 9.75 X10(3)/MCL (ref 4.5–11.5)

## 2024-03-21 PROCEDURE — 84478 ASSAY OF TRIGLYCERIDES: CPT

## 2024-03-21 PROCEDURE — 94760 N-INVAS EAR/PLS OXIMETRY 1: CPT

## 2024-03-21 PROCEDURE — 80202 ASSAY OF VANCOMYCIN: CPT | Performed by: INTERNAL MEDICINE

## 2024-03-21 PROCEDURE — 25000003 PHARM REV CODE 250: Performed by: INTERNAL MEDICINE

## 2024-03-21 PROCEDURE — 25000003 PHARM REV CODE 250: Performed by: STUDENT IN AN ORGANIZED HEALTH CARE EDUCATION/TRAINING PROGRAM

## 2024-03-21 PROCEDURE — 94640 AIRWAY INHALATION TREATMENT: CPT

## 2024-03-21 PROCEDURE — 97166 OT EVAL MOD COMPLEX 45 MIN: CPT

## 2024-03-21 PROCEDURE — 80053 COMPREHEN METABOLIC PANEL: CPT

## 2024-03-21 PROCEDURE — 84100 ASSAY OF PHOSPHORUS: CPT

## 2024-03-21 PROCEDURE — 99900035 HC TECH TIME PER 15 MIN (STAT)

## 2024-03-21 PROCEDURE — 92610 EVALUATE SWALLOWING FUNCTION: CPT

## 2024-03-21 PROCEDURE — 85025 COMPLETE CBC W/AUTO DIFF WBC: CPT

## 2024-03-21 PROCEDURE — 97162 PT EVAL MOD COMPLEX 30 MIN: CPT

## 2024-03-21 PROCEDURE — C9113 INJ PANTOPRAZOLE SODIUM, VIA: HCPCS | Performed by: INTERNAL MEDICINE

## 2024-03-21 PROCEDURE — 27000221 HC OXYGEN, UP TO 24 HOURS

## 2024-03-21 PROCEDURE — 27000207 HC ISOLATION

## 2024-03-21 PROCEDURE — 25000003 PHARM REV CODE 250: Performed by: NURSE PRACTITIONER

## 2024-03-21 PROCEDURE — 25000242 PHARM REV CODE 250 ALT 637 W/ HCPCS: Performed by: STUDENT IN AN ORGANIZED HEALTH CARE EDUCATION/TRAINING PROGRAM

## 2024-03-21 PROCEDURE — 63600175 PHARM REV CODE 636 W HCPCS: Performed by: INTERNAL MEDICINE

## 2024-03-21 PROCEDURE — 63600175 PHARM REV CODE 636 W HCPCS: Mod: JZ,JG

## 2024-03-21 PROCEDURE — 82272 OCCULT BLD FECES 1-3 TESTS: CPT | Performed by: INTERNAL MEDICINE

## 2024-03-21 PROCEDURE — 83735 ASSAY OF MAGNESIUM: CPT

## 2024-03-21 PROCEDURE — 20000000 HC ICU ROOM

## 2024-03-21 PROCEDURE — 25000003 PHARM REV CODE 250

## 2024-03-21 RX ORDER — MIDODRINE HYDROCHLORIDE 5 MG/1
10 TABLET ORAL
Status: DISCONTINUED | OUTPATIENT
Start: 2024-03-21 | End: 2024-04-12 | Stop reason: HOSPADM

## 2024-03-21 RX ORDER — MAGNESIUM SULFATE HEPTAHYDRATE 40 MG/ML
4 INJECTION, SOLUTION INTRAVENOUS ONCE
Status: DISCONTINUED | OUTPATIENT
Start: 2024-03-21 | End: 2024-03-21

## 2024-03-21 RX ORDER — ATORVASTATIN CALCIUM 40 MG/1
80 TABLET, FILM COATED ORAL DAILY
Status: DISCONTINUED | OUTPATIENT
Start: 2024-03-22 | End: 2024-04-12 | Stop reason: HOSPADM

## 2024-03-21 RX ORDER — ENOXAPARIN SODIUM 100 MG/ML
1 INJECTION SUBCUTANEOUS EVERY 12 HOURS
Status: DISCONTINUED | OUTPATIENT
Start: 2024-03-21 | End: 2024-03-21

## 2024-03-21 RX ORDER — PANTOPRAZOLE SODIUM 40 MG/10ML
40 INJECTION, POWDER, LYOPHILIZED, FOR SOLUTION INTRAVENOUS 2 TIMES DAILY
Status: DISCONTINUED | OUTPATIENT
Start: 2024-03-21 | End: 2024-04-11

## 2024-03-21 RX ORDER — LEVALBUTEROL INHALATION SOLUTION 1.25 MG/3ML
1.25 SOLUTION RESPIRATORY (INHALATION) EVERY 6 HOURS
Status: DISCONTINUED | OUTPATIENT
Start: 2024-03-21 | End: 2024-03-24

## 2024-03-21 RX ORDER — FUROSEMIDE 10 MG/ML
40 INJECTION INTRAMUSCULAR; INTRAVENOUS ONCE
Status: DISCONTINUED | OUTPATIENT
Start: 2024-03-21 | End: 2024-03-21

## 2024-03-21 RX ADMIN — LORAZEPAM 2 MG: 1 TABLET ORAL at 12:03

## 2024-03-21 RX ADMIN — THIAMINE HYDROCHLORIDE 500 MG: 100 INJECTION, SOLUTION INTRAMUSCULAR; INTRAVENOUS at 10:03

## 2024-03-21 RX ADMIN — MORPHINE SULFATE 2 MG: 4 INJECTION, SOLUTION INTRAMUSCULAR; INTRAVENOUS at 04:03

## 2024-03-21 RX ADMIN — PIPERACILLIN AND TAZOBACTAM 4.5 G: 4; .5 INJECTION, POWDER, LYOPHILIZED, FOR SOLUTION INTRAVENOUS; PARENTERAL at 10:03

## 2024-03-21 RX ADMIN — OXYCODONE HYDROCHLORIDE 5 MG: 5 TABLET ORAL at 08:03

## 2024-03-21 RX ADMIN — PANTOPRAZOLE SODIUM 40 MG: 40 INJECTION, POWDER, FOR SOLUTION INTRAVENOUS at 08:03

## 2024-03-21 RX ADMIN — MIDODRINE HYDROCHLORIDE 10 MG: 5 TABLET ORAL at 05:03

## 2024-03-21 RX ADMIN — AMIODARONE HYDROCHLORIDE 400 MG: 200 TABLET ORAL at 08:03

## 2024-03-21 RX ADMIN — THIAMINE HYDROCHLORIDE 500 MG: 100 INJECTION, SOLUTION INTRAMUSCULAR; INTRAVENOUS at 03:03

## 2024-03-21 RX ADMIN — CHLORHEXIDINE GLUCONATE 0.12% ORAL RINSE 15 ML: 1.2 LIQUID ORAL at 08:03

## 2024-03-21 RX ADMIN — THERA TABS 1 TABLET: TAB at 10:03

## 2024-03-21 RX ADMIN — AMIODARONE HYDROCHLORIDE 400 MG: 200 TABLET ORAL at 09:03

## 2024-03-21 RX ADMIN — PIPERACILLIN AND TAZOBACTAM 4.5 G: 4; .5 INJECTION, POWDER, LYOPHILIZED, FOR SOLUTION INTRAVENOUS; PARENTERAL at 06:03

## 2024-03-21 RX ADMIN — MIDODRINE HYDROCHLORIDE 10 MG: 5 TABLET ORAL at 12:03

## 2024-03-21 RX ADMIN — LEVALBUTEROL HYDROCHLORIDE 1.25 MG: 1.25 SOLUTION RESPIRATORY (INHALATION) at 09:03

## 2024-03-21 RX ADMIN — ATORVASTATIN CALCIUM 80 MG: 40 TABLET, FILM COATED ORAL at 10:03

## 2024-03-21 RX ADMIN — CHLORHEXIDINE GLUCONATE 0.12% ORAL RINSE 15 ML: 1.2 LIQUID ORAL at 09:03

## 2024-03-21 RX ADMIN — PIPERACILLIN AND TAZOBACTAM 4.5 G: 4; .5 INJECTION, POWDER, LYOPHILIZED, FOR SOLUTION INTRAVENOUS; PARENTERAL at 03:03

## 2024-03-21 RX ADMIN — THIAMINE HYDROCHLORIDE 500 MG: 100 INJECTION, SOLUTION INTRAMUSCULAR; INTRAVENOUS at 08:03

## 2024-03-21 RX ADMIN — FOLIC ACID 1 MG: 1 TABLET ORAL at 10:03

## 2024-03-21 RX ADMIN — OXYCODONE HYDROCHLORIDE 5 MG: 5 TABLET ORAL at 01:03

## 2024-03-21 RX ADMIN — FAMOTIDINE 20 MG: 10 INJECTION, SOLUTION INTRAVENOUS at 09:03

## 2024-03-21 RX ADMIN — VANCOMYCIN HYDROCHLORIDE 1250 MG: 1.25 INJECTION, POWDER, LYOPHILIZED, FOR SOLUTION INTRAVENOUS at 10:03

## 2024-03-21 RX ADMIN — POTASSIUM PHOSPHATE, MONOBASIC AND POTASSIUM PHOSPHATE, DIBASIC 30 MMOL: 224; 236 INJECTION, SOLUTION, CONCENTRATE INTRAVENOUS at 05:03

## 2024-03-21 NOTE — PROGRESS NOTES
Pharmacokinetic Assessment Follow Up: IV Vancomycin    Vancomycin serum concentration assessment(s):    The trough level was drawn correctly and can be used to guide therapy at this time. The measurement is above the desired definitive target range of 15 to 20 mcg/mL.    Vancomycin Regimen Plan:    Will hold morning dose today and re-dose tonight with 1250 mg x 1  Discontinue the scheduled vancomycin regimen and re-dose when the random level is less than 20 mcg/mL, next level to be drawn at 0900 on 03/22.    Scheduled Administration Times        Drug levels (last 3 results):  Recent Labs   Lab Result Units 03/19/24  1029 03/21/24  0951   Vancomycin Trough ug/ml 10.0* 24.3*       Vancomycin Administrations:  vancomycin given in the last 96 hours                     vancomycin 1.25 g in dextrose 5% 250 mL IVPB (ready to mix) (mg) 1,250 mg New Bag 03/20/24 2311     1,250 mg New Bag  1137     1,250 mg New Bag 03/19/24 2312    vancomycin in dextrose 5 % 1 gram/250 mL IVPB 1,000 mg (mg) 1,000 mg New Bag 03/19/24 1051     1,000 mg New Bag 03/18/24 2319     1,000 mg New Bag  1126    vancomycin 1.5 g in dextrose 5 % 250 mL IVPB (ready to mix) ()  Restarted 03/17/24 2358     1,500 mg New Bag  2328                    Pharmacy will continue to follow and monitor vancomycin.    Please contact pharmacy at extension [phone] for questions regarding this assessment.    Thank you for the consult,   Madhu Lynne       Patient brief summary:  Ran Reyes Jr. is a 68 y.o. male initiated on antimicrobial therapy with IV Vancomycin for treatment of sepsis    The patient's current regimen is discontinued due to elevated trough level.    Drug Allergies:   Review of patient's allergies indicates:  No Known Allergies    Actual Body Weight:  Wt Readings from Last 1 Encounters:   03/18/24 90 kg (198 lb 6.6 oz)       Renal Function:   Estimated Creatinine Clearance: 98 mL/min (based on SCr of 0.79 mg/dL).,     Dialysis Method (if  applicable):  N/A    CBC (last 72 hours):  Recent Labs   Lab Result Units 03/18/24 2226 03/19/24  0021 03/19/24  0537 03/20/24  0206 03/21/24  0220   WBC x10(3)/mcL 11.77* 9.42  9.42 9.93  9.93 12.28  12.28* 9.75   Hgb g/dL 8.2* 8.9* 8.7* 8.5* 8.6*   Hemoglobin A1c % 5.0  --   --   --   --    Hct % 25.1* 26.9* 27.5* 25.7* 27.2*   Platelet x10(3)/mcL 117* 134 151 154 199   Mono % % 8.2  --   --   --  8.6   Monocytes % %  --  4 6 8  --    Eos % % 0.0  --   --   --  0.3   Basophil % % 0.6  --   --   --  0.7       Metabolic Panel (last 72 hours):  Recent Labs   Lab Result Units 03/18/24 2226 03/19/24  0020 03/19/24  0128 03/19/24  0537 03/19/24  1742 03/20/24  0206 03/21/24  0219   Sodium Level mmol/L 133* 129*  --  137 137 136 140   Potassium Level mmol/L 3.3* 3.0*  --  4.2 3.4* 3.7 3.4*   Chloride mmol/L 99 95*  --  101 101 101 101   Carbon Dioxide mmol/L 18* 18*  --  25 24 25 29   Glucose Level mg/dL 159* 515*  --  217* 157* 127* 104   Glucose, UA   --   --  3+*  --   --   --   --    Blood Urea Nitrogen mg/dL 16.2 18.9  --  15.3 16.2 15.2 10.6   Creatinine mg/dL 0.94 1.16  --  0.83 0.81 0.84 0.79   Albumin Level g/dL 2.3* 2.3*  --  2.3*  --  2.0* 2.1*   Bilirubin Total mg/dL 0.8 0.8  --  0.5  --  0.4 0.5   Alkaline Phosphatase unit/L 67 75  --  61  --  55 64   Aspartate Aminotransferase unit/L 64* 32  --  28  --  17 21   Alanine Aminotransferase unit/L 16 15  --  16  --  12 11   Magnesium Level mg/dL 2.20 2.30  --  2.40 2.30 2.30 2.20   Phosphorus Level mg/dL 3.2 3.2  --  3.3 2.2* 2.8 1.7*       Microbiologic Results:  Microbiology Results (last 7 days)       Procedure Component Value Units Date/Time    Anaerobic Culture [[id number]] Collected: 03/18/24 1502    Order Status: Completed Specimen: Abscess from Scrotum Updated: 03/21/24 0941     Anaerobe Culture No Anaerobes Isolated    Respiratory Culture [[id number]] Collected: 03/20/24 0940    Order Status: Completed Specimen: Sputum, Expectorated Updated:  03/21/24 0842     GRAM STAIN Quality 2+      No bacteria seen    Blood Culture [[id number]]  (Normal) Collected: 03/18/24 2226    Order Status: Completed Specimen: Blood, Venous Updated: 03/20/24 2300     CULTURE, BLOOD ([facility]) No Growth At 48 Hours    Blood culture #1 **CANNOT BE ORDERED STAT** [[id number]]  (Normal) Collected: 03/17/24 2103    Order Status: Completed Specimen: Blood Updated: 03/20/24 2201     CULTURE, BLOOD ([facility]) No Growth At 72 Hours    Blood culture #2 **CANNOT BE ORDERED STAT** [[id number]]  (Normal) Collected: 03/17/24 2103    Order Status: Completed Specimen: Blood Updated: 03/20/24 2201     CULTURE, BLOOD ([facility]) No Growth At 72 Hours    Wound Culture [[id number]]  (Abnormal) Collected: 03/18/24 1502    Order Status: Completed Specimen: Abscess from Scrotum Updated: 03/20/24 1214     Wound Culture Many Escherichia coli      Many Streptococcus agalactiae (Group B)    Urine culture [[id number]]  (Abnormal) Collected: 03/18/24 0034    Order Status: Completed Specimen: Urine Updated: 03/19/24 1004     Urine Culture No other significant growth      10,000 - 25,000 colonies/ml Yeast species     Comment: We have not further evaluated these organisms because three or more species compatible with normal genital jt usually represents specimen contamination from the time of collection, rather than infection. If clinical circumstances warrant further   workup of these organisms, please contact the Microbiology dept at [phone]; otherwise please have the patient submit another specimen.       Chlamydia/GC, PCR [[id number]] Collected: 03/19/24 0128    Order Status: Completed Specimen: Urine Updated: 03/19/24 0527     Chlamydia trachomatis PCR Not Detected     N. gonorrhea PCR Not Detected     Source Urine    Narrative:      The Xpert CT/NG test, performed on the GeneXpert system is a qualitative in vitro real-time polymerase chain reaction (PCR) test for the automated detected and differentiation for  genomic DNA from Chlamydia trachomatis (CT) and/or Neisseria gonorrhoeae (NG).    STD Urine (CT/GC/Trich) [[id number]] Collected: 03/19/24 0128    Order Status: Canceled Specimen: Urine Updated: 03/19/24 0138

## 2024-03-21 NOTE — NURSING
Nurses Note -- 4 Eyes      3/21/2024   4:17 AM      Skin assessed during: Daily Assessment      [] No Altered Skin Integrity Present    [x]Prevention Measures Documented      [x] Yes- Altered Skin Integrity Present or Discovered   [] LDA Added if Not in Epic (Describe Wound)   [] New Altered Skin Integrity was Present on Admit and Documented in LDA   [] Wound Image Taken    Wound Care Consulted? Yes    Attending Nurse:  Brittany Hollis RN/Staff Member:   Waylon Hernandez RN

## 2024-03-21 NOTE — PLAN OF CARE
Problem: Occupational Therapy  Goal: Occupational Therapy Goal  Description: Goals to be met by: 4/21/24     Patient will increase functional independence with ADLs by performing:    UE Dressing with Stand-by Assistance.  LE Dressing with Min A  Grooming while standing at sink with Contact Guard Assistance.  Toileting from toilet with Contact Guard Assistance for hygiene and clothing management.   Toilet transfer to toilet with Contact Guard Assistance.  Increased functional strength to 4/5 through therEx.    Outcome: Ongoing, Progressing

## 2024-03-21 NOTE — PT/OT/SLP EVAL
Occupational Therapy  Evaluation    Name: Ran Reyes Jr.  MRN: [id number]  Admitting Diagnosis:   Recent Surgery: Procedure(s) (LRB):  Incision and Drainage (N/A)  ORCHIECTOMY (Left) 3 Days Post-Op    Recommendations:     Discharge therapy intensity: Moderate Intensity Therapy   Discharge Equipment Recommendations:  to be determined by next level of care    Assessment:   Ran Reyes Jr. is a 68 y.o. male with a medical diagnosis of testicular swelling and bleeding; L scrotal abscess; sepsis; UTI; s/p orchiectomy, joselito scrotal exploration, debridement; post op vfib arrest; Human Metapneumovirus.  Pt with BUE tremors at rest and with activity, impacting his ability to utilize RW.  Pt with weakness and overall debility.  Expect mod intensity therapy upon discharge from hospital.    He presents with the following performance deficits affecting function: weakness, impaired self care skills, impaired functional mobility, gait instability, decreased upper extremity function, decreased lower extremity function, decreased safety awareness, impaired skin, impaired endurance, impaired balance.     Rehab Prognosis: Good; patient would benefit from acute skilled OT services to address these deficits and reach maximum level of function.       Plan:     Patient to be seen 5 x/week to address the above listed problems via self-care/home management, therapeutic exercises  Plan of Care Expires: 04/21/24  Plan of Care Reviewed with: patient    Subjective     Chief Complaint: no complaints, pleasant and agreeable  Patient/Family Comments/goals: tbd    Occupational Profile:  Living Environment: lives alone, limited family support, no steps, tub/shower no bench  Previous level of function: independent, previously worked at [employer]; walks and gets rides for transportation, does not drive.  He utilizes cane occasionally  Roles and Routines: brother, friend, uncle  Equipment Used at Home: cane, straight  Assistance upon Discharge:  TBD, per chart sister Yesenia assists intermittently    Pain/Comfort:  Pain Rating 1: 0/10    Patients cultural, spiritual, Hindu conflicts given the current situation:      Objective:     OT communicated with RN prior to session.      Patient was found L sidelying with  (vital monitoring, scrotal bandage, booth, SCD, wedge, heel floats) upon OT entry to room.    General Precautions: Standard,    Orthopedic Precautions: N/A  Braces: N/A    Vital Signs: 65790 O2 saturation 90%, HR 73, 5L NC    Bed Mobility:    Patient completed Supine to Sit with maximal assistance  Patient completed Sit to Supine with maximal assistance    Functional Mobility/Transfers:  Patient completed Sit <> Stand Transfer with maximal assistance  with  rolling walker   Functional Mobility: modx2 sit<>stand, limited by tremors and ability to stabilize self on RW, unable to ambulate away from bed to toilet, will progress, will order BSC    Activities of Daily Living:  Toileting: total assistance booth and unable to ambulate to toilet for BM, placed on bedpan.  RN aware and to check on pt in a few minutes    AMPAC 6 Click ADL:  AMPAC Total Score: 12    Functional Cognition:  Easily distracted, mumbled speech, follows simple commands      Upper Extremity Function:  Right Upper Extremity:   Limited shoulder AROM, remainder 3+/5, tremors    Left Upper Extremity:  Limited shoulder AROM, remainder 3+/5, tremors    Balance:   Min assist static sitting balance    Therapeutic Positioning  Risk for acquired pressure injuries is increased due to relative decrease in mobility d/t hospitalization  and impaired mobility.    OT interventions performed during the course of today's session:   Education was provided on benefits of and recommendations for therapeutic positioning    Skin assessment: all bony prominences were assessed    Findings: no redness or breakdown noted    OT recommendations for therapeutic positioning throughout hospitalization:   Follow  [facility] Pressure Injury Prevention Protocol    Patient Education:  Patient provided with verbal education education regarding OT role/goals/POC, fall prevention, Discharge/DME recommendations, and pressure ulcer prevention.  Understanding was verbalized, however additional teaching warranted.     Patient left on bedpan, RN to check on pt in a few minutes.      GOALS:   Multidisciplinary Problems       Occupational Therapy Goals          Problem: Occupational Therapy    Goal Priority Disciplines Outcome Interventions   Occupational Therapy Goal     OT, PT/OT Ongoing, Progressing    Description: Goals to be met by: 4/21/24     Patient will increase functional independence with ADLs by performing:    UE Dressing with Stand-by Assistance.  LE Dressing with Min A  Grooming while standing at sink with Contact Guard Assistance.  Toileting from toilet with Contact Guard Assistance for hygiene and clothing management.   Toilet transfer to toilet with Contact Guard Assistance.  Increased functional strength to 4/5 through therEx.                         History:     Past Medical History:   Diagnosis Date    A-fib     Anticoagulant long-term use     Aortic aneurysm     Cataract     CHF (congestive heart failure)     Chronic atrial fibrillation     COPD (chronic obstructive pulmonary disease)     Coronary artery disease     HLD (hyperlipidemia)     Hypertension     Mitral regurgitation     PAD (peripheral artery disease)     Primary open angle glaucoma (POAG)          Past Surgical History:   Procedure Laterality Date    ATHERECTOMY OF PERIPHERAL VESSEL Left 09/12/2022    LEFT SFA ATHERECTOMY, BALLOON ANGIOPLASTY    CATARACT EXTRACTION W/  INTRAOCULAR LENS IMPLANT Left 3/9/2023    Procedure: EXTRACTION, CATARACT, WITH IOL INSERTION;  Surgeon: Georgi Borja MD;  Location: [facility];  Service: Ophthalmology;  Laterality: Left;  19.5  mac    Heart Stent N/A     > 10yrs. AGO    INCISION AND DRAINAGE N/A 3/18/2024    Procedure:  Incision and Drainage;  Surgeon: Fahad Rivas MD;  Location: [facility];  Service: Urology;  Laterality: N/A;  I&D SCROTAL ABSCESS    INSERTION OF STENT INTO PERIPHERAL VESSEL Right 10/17/2022    RIGHT SFA ATHERECTOMY, BALLOON ANGIOPLASTY, STENT; RIGHT ANTERIOR TIBIAL ARTERY ATHERECTOMY, BALLOON ANGIOPLASTY    ORCHIECTOMY Left 3/18/2024    Procedure: ORCHIECTOMY;  Surgeon: Fahad Rivas MD;  Location: [facility];  Service: Urology;  Laterality: Left;       Time Tracking:     OT Date of Treatment:    OT Start Time: 0926  OT Stop Time: 0949  OT Total Time (min): 23 min    Billable Minutes:Evaluation MOD    3/21/2024

## 2024-03-21 NOTE — PT/OT/SLP EVAL
[facility]  Speech Language Pathology Department  Clinical Swallow Evaluation    Patient Name:  Ran Reyes Jr.   MRN:  [id number]    Recommendations     General recommendations:  SLP intervention not indicated  Diet texture/consistency recommendations:  Regular solids (IDDSI 7) and thin liquids (IDDSI 0)  Medications: per patient preference  Swallow strategies/precautions: small bites/sips and slow rate  Precautions: Standard,      History     Ran Reyes Jr. is a 68 y.o. male admitted seen at [facility] for scrotal pain and bleeding. Intubated 3/18-3/20.    Past Medical History:   Diagnosis Date    A-fib     Anticoagulant long-term use     Aortic aneurysm     Cataract     CHF (congestive heart failure)     Chronic atrial fibrillation     COPD (chronic obstructive pulmonary disease)     Coronary artery disease     HLD (hyperlipidemia)     Hypertension     Mitral regurgitation     PAD (peripheral artery disease)     Primary open angle glaucoma (POAG)      Past Surgical History:   Procedure Laterality Date    ATHERECTOMY OF PERIPHERAL VESSEL Left 09/12/2022    LEFT SFA ATHERECTOMY, BALLOON ANGIOPLASTY    CATARACT EXTRACTION W/  INTRAOCULAR LENS IMPLANT Left 3/9/2023    Procedure: EXTRACTION, CATARACT, WITH IOL INSERTION;  Surgeon: Georgi Borja MD;  Location: [facility];  Service: Ophthalmology;  Laterality: Left;  19.5  mac    Heart Stent N/A     > 10yrs. AGO    INCISION AND DRAINAGE N/A 3/18/2024    Procedure: Incision and Drainage;  Surgeon: Fahad Rivas MD;  Location: [facility];  Service: Urology;  Laterality: N/A;  I&D SCROTAL ABSCESS    INSERTION OF STENT INTO PERIPHERAL VESSEL Right 10/17/2022    RIGHT SFA ATHERECTOMY, BALLOON ANGIOPLASTY, STENT; RIGHT ANTERIOR TIBIAL ARTERY ATHERECTOMY, BALLOON ANGIOPLASTY    ORCHIECTOMY Left 3/18/2024    Procedure: ORCHIECTOMY;  Surgeon: Fahad Rivas MD;  Location: [facility];  Service: Urology;  Laterality: Left;     Prior  Pt stopped at the office to request a referral to see:      Dr Russell Urologist   In [facility]  Appt  On 11/23    Referral follow up from surgery ( to take out a stent)      Pls follow up with patient when is ready , and also asking if office can fax over the referral.     Intubation HX:  3/18-3/20    Home diet texture/consistency: Regular and thin liquids  Current method of nutrition: NPO    Imaging   Results for orders placed during the hospital encounter of 03/17/24    X-Ray Chest 1 View    Narrative  EXAMINATION  XR CHEST 1 VIEW    CLINICAL HISTORY  Intubation;    TECHNIQUE  A total of 1 frontal image(s) of the chest.    COMPARISON  19 March 2024    FINDINGS  Lines/tubes/devices: Grossly unchanged positioning when allowing for differences in technique and patient rotation.    The cardiac silhouette and central vascular structures are unchanged.  The trachea is midline. Hazy ill-defined infiltrate is more pronounced at the right lower lung zone.  Left lung field remains relatively clear.  There is no enlarging pleural effusion or convincing pneumothorax.    Regional osseous structures and extrathoracic soft tissues are similar.    IMPRESSION  1. Mildly worsened ill-defined right lower lung infiltrate.  2. Remaining findings similar.      Electronically signed by: Ramana Mcleod  Date:    03/20/2024  Time:    07:45    No results found for this or any previous visit.    No results found for this or any previous visit.    Subjective     Patient awake, alert, and cooperative.    Pain/Comfort: Pain Rating 1: 0/10    Objective     ORAL MUSCULATURE  Dentition: own teeth  Secretion Management: adequate  Facial Movement: WFL  Buccal Strength & Mobility: WFL  Mandibular Strength & Mobility: WFL  Oral Labial Strength & Mobility: WFL  Lingual Strength & Mobility: WFL  Vocal Quality: adequate    Consistency Fed By Oral Symptoms Pharyngeal Symptoms   Thin liquid by straw Self None None   Puree Self None None   Chewable solid Self None None     Assessment     No signs/symptoms of aspiration. Initiate regular diet.    Goals     Multidisciplinary Problems       SLP Goals       Not on file                  Education     Patient provided with verbal education regarding POC.  Understanding was  verbalized.    Plan     SLP Follow-Up:  No   Plan of Care reviewed with:  patient     Time Tracking     SLP Treatment Date:   03/21/24  Speech Start Time:  1030  Speech Stop Time:  1050     Speech Total Time (min):  20 min    Billable minutes:  Swallow and Oral Function Evaluation, 20 minutes     03/21/2024

## 2024-03-21 NOTE — PROGRESS NOTES
Pulmonary & Critical Care Medicine   Progress Note      Presenting History/HPI:  Patient is a 68-year-old male with past medical history of nonischemic cardiomyopathy, HFrEF (EF 41%), chronic atrial fibrillation on Xarelto, peripheral arterial disease, COPD, hypertension, large left hydrocele who presented to the emergency department on 03/18/2024 for what he thought to be rectal bleeding.  Patient eventually found that source was from posterior aspect of scrotum.  Patient has had persistent swelling in that region for at least the last couple of weeks.  In the emergency room patient was tachycardic and EKG showed AFib with RVR, blood pressure is were slightly on low side.  Patient did have leukocytosis of 15,000, hemoglobin had showed significant decrease from last check.  Doppler ultrasound could not rule out torsion.  Cultures were drawn and broad-spectrum antibiotics were started.  Urology was consulted and they made decision to proceed with emergent scrotal exploration.  Patient underwent surgery, seemed to tolerate well.  In the PACU, patient was persistently hypotensive with blood pressures staying approximately 80/50 despite fluid resuscitation.  Patient was placed on phenylephrine for vasopressor support and ICU was consulted for admission.     Hospital Course/Significant events:  3/17/24 - seen in ER at [facility] for scrotal pain and bleeding  3/18/24 - D, abscess removal/culture left joselito orchiectomy; subsequent hypootension and code     Interval History:  Patient with no acute issues overnight, he was extubated yesterday.  ABG from last night showed slightly low PaO2.  Patient now on Ventimask at 50% FiO2, saturating well.  Patient appears comfortable, speech is low and hard to understand.  He states he has a sore throat this morning.  Was weaned off of dobutamine, now only on Levophed 0.01 micrograms/kilogram per minute.  Hemodynamically stable this morning, no episodes of fever overnight.  980 mL net  negative over last 24 hours    Scheduled Medications:    amiodarone  400 mg Oral BID    Followed by    [START ON 3/25/2024] amiodarone  200 mg Oral BID    Followed by    [START ON 3/31/2024] amiodarone  200 mg Oral Daily    atorvastatin  80 mg Oral Daily    chlorhexidine  15 mL Mouth/Throat BID    enoxparin  40 mg Subcutaneous Q24H (prophylaxis, 1700)    famotidine (PF)  20 mg Intravenous BID    folic acid  1 mg Oral Daily    levalbuterol  1.25 mg Nebulization Q12H    midazolam  2 mg Intravenous Once    midodrine  2.5 mg Oral BID WM    multivitamin  1 tablet Oral Daily    piperacillin-tazobactam (Zosyn) IV (PEDS and ADULTS) (extended infusion is not appropriate)  4.5 g Intravenous Q8H    potassium phosphate IVPB  30 mmol Intravenous Once    scopolamine  1 patch Transdermal Q3 Days    thiamine (B-1) 500 mg in dextrose 5 % (D5W) 100 mL IVPB  500 mg Intravenous TID    vancomycin (VANCOCIN) IV (PEDS and ADULTS)  1,250 mg Intravenous Q12H   PRN Medications:   acetaminophen, dextrose 10 % in water (D10W), dextrose 10 % in water (D10W), dextrose 10%, dextrose 10%, insulin aspart U-100, ipratropium, LORazepam, melatonin, morphine, oxyCODONE, sodium chloride 0.9%, sodium chloride 0.9%, Pharmacy to dose Vancomycin consult **AND** vancomycin - pharmacy to dose  Infusions:     dexmedeTOMIDine (Precedex) infusion (titrating) Stopped (03/20/24 0939)    NORepinephrine bitartrate-D5W 0.01 mcg/kg/min (03/21/24 0608)   Fluid Balance:     Intake/Output Summary (Last 24 hours) at 3/21/2024 0733  Last data filed at 3/21/2024 0608  Gross per 24 hour   Intake 1342.76 ml   Output 2320 ml   Net -977.24 ml       Vital Signs:   Vitals:    03/21/24 0600   BP: (!) 121/93   Pulse: 83   Resp: (!) 24   Temp:          Physical Exam  Constitutional:       General: He is not in acute distress.     Appearance: He is ill-appearing.   HENT:      Head: Normocephalic and atraumatic.   Cardiovascular:      Rate and Rhythm: Normal rate and regular rhythm.       Pulses: Normal pulses.      Heart sounds: No murmur heard.  Pulmonary:      Effort: Pulmonary effort is normal.      Breath sounds: Rhonchi present.   Abdominal:      Palpations: Abdomen is soft.      Tenderness: There is no abdominal tenderness.   Genitourinary:     Comments: Scrotal wound packed  Musculoskeletal:      Right lower leg: No edema.      Left lower leg: No edema.   Skin:     Capillary Refill: Capillary refill takes less than 2 seconds.   Neurological:      General: No focal deficit present.      Mental Status: He is alert and oriented to person, place, and time.     Laboratory Studies:   Recent Labs   Lab 03/20/24  1939   PH 7.500*   PCO2 45.0   PO2 52.0*   HCO3 35.1*       Recent Labs   Lab 03/21/24  0220   WBC 9.75   RBC 2.80*   HGB 8.6*   HCT 27.2*      MCV 97.1*   MCH 30.7   MCHC 31.6*       Recent Labs   Lab 03/21/24  0219   GLUCOSE 104      K 3.4*   CO2 29   BUN 10.6   CREATININE 0.79   CALCIUM 7.7*   MG 2.20     Microbiology Data:   Microbiology Results (last 7 days)       Procedure Component Value Units Date/Time    Blood Culture [[id number]]  (Normal) Collected: 03/18/24 2226    Order Status: Completed Specimen: Blood, Venous Updated: 03/20/24 2300     CULTURE, BLOOD ([facility]) No Growth At 48 Hours    Blood culture #1 **CANNOT BE ORDERED STAT** [[id number]]  (Normal) Collected: 03/17/24 2103    Order Status: Completed Specimen: Blood Updated: 03/20/24 2201     CULTURE, BLOOD ([facility]) No Growth At 72 Hours    Blood culture #2 **CANNOT BE ORDERED STAT** [[id number]]  (Normal) Collected: 03/17/24 2103    Order Status: Completed Specimen: Blood Updated: 03/20/24 2201     CULTURE, BLOOD ([facility]) No Growth At 72 Hours    Wound Culture [[id number]]  (Abnormal) Collected: 03/18/24 1502    Order Status: Completed Specimen: Abscess from Scrotum Updated: 03/20/24 1214     Wound Culture Many Escherichia coli      Many Streptococcus agalactiae (Group B)    Anaerobic Culture [[id number]] Collected:  03/18/24 1502    Order Status: Completed Specimen: Abscess from Scrotum Updated: 03/20/24 1035     Anaerobe Culture No Anaerobes Isolated    Respiratory Culture [[id number]] Collected: 03/20/24 0940    Order Status: Sent Specimen: Sputum, Expectorated Updated: 03/20/24 0940    Urine culture [[id number]]  (Abnormal) Collected: 03/18/24 0034    Order Status: Completed Specimen: Urine Updated: 03/19/24 1004     Urine Culture No other significant growth      10,000 - 25,000 colonies/ml Yeast species     Comment: We have not further evaluated these organisms because three or more species compatible with normal genital jt usually represents specimen contamination from the time of collection, rather than infection. If clinical circumstances warrant further   workup of these organisms, please contact the Microbiology dept at [phone]; otherwise please have the patient submit another specimen.       Chlamydia/GC, PCR [[id number]] Collected: 03/19/24 0128    Order Status: Completed Specimen: Urine Updated: 03/19/24 0527     Chlamydia trachomatis PCR Not Detected     N. gonorrhea PCR Not Detected     Source Urine    Narrative:      The Xpert CT/NG test, performed on the GeneXpert system is a qualitative in vitro real-time polymerase chain reaction (PCR) test for the automated detected and differentiation for genomic DNA from Chlamydia trachomatis (CT) and/or Neisseria gonorrhoeae (NG).    STD Urine (CT/GC/Trich) [[id number]] Collected: 03/19/24 0128    Order Status: Canceled Specimen: Urine Updated: 03/19/24 0138        Imaging:   XR Gastric tube check, non-radiologist performed  EXAMINATION:  XR GASTRIC TUBE CHECK, NON-RADIOLOGIST PERFORMED    CLINICAL HISTORY:  Correct Placement Check;    TECHNIQUE:  AP View(s) of the abdomen was performed.    COMPARISON:  03/19/2024    FINDINGS:  Enteric tube terminates in the stomach.  Side port is at the GE junction.  Suggest advancing approximately 7 cm for more optimal  positioning.    Electronically signed by: Sailaja Atkinson  Date:    03/20/2024  Time:    17:03  X-Ray Chest 1 View  EXAMINATION  XR CHEST 1 VIEW    CLINICAL HISTORY  Intubation;    TECHNIQUE  A total of 1 frontal image(s) of the chest.    COMPARISON  19 March 2024    FINDINGS  Lines/tubes/devices: Grossly unchanged positioning when allowing for differences in technique and patient rotation.    The cardiac silhouette and central vascular structures are unchanged.  The trachea is midline. Hazy ill-defined infiltrate is more pronounced at the right lower lung zone.  Left lung field remains relatively clear.  There is no enlarging pleural effusion or convincing pneumothorax.    Regional osseous structures and extrathoracic soft tissues are similar.    IMPRESSION  1. Mildly worsened ill-defined right lower lung infiltrate.  2. Remaining findings similar.    Electronically signed by: Ramana Mcleod  Date:    03/20/2024  Time:    07:45    Assessment and Plan    Assessment:  Scrotal abscess s/p I&D with left joselito orchiectomy 03/18/2024  Recent cardiac arrest w/ associated (03/18/2024)  Sepsis, likely from urinary tract infection req vasopressor support  Chronic atrial fibrillation, on anticoagulation with Xarelto  Partial bowel obstruction  HFrEF (EF 41%)  VHD  Type 2 NSTEMI  History of COPD   History of hypertension   History of hyperlipidemia     Plan:  -extubated yesterday without issue, remains on Ventimask at 50% FiO2.  Wean as tolerated to keep oxygen saturation greater than 92%  -weaned off dobutamine, on negligible dose of Levophed at this time.  Should continue to wean this as well   -continuing broad-spectrum antibiotics for sepsis  -will need continued cardiac workup once more stable   -likely okay to discharge from ICU once off Levophed  - Hx of Alcohol Abuse,  DT prophylaxis Thiamine, and folic acid and CIWA protocol   -Good urine output stable creatinine  -keep blood glucose less than 180 check TSH  -monitor  and replace electrolytes    DVT ppx/tx with lovenox  GI ppx with protonix  Keep HOB elevated > 30*    The patient remains at high risk of decompensation and death and will remain in ICU level care     32 min of critical care time was spent reviewing the patient's chart including medications, radiographs, labs, pertinent cultures and pathology data, other consultant notes/recomendations as well as titration of vasopressors, adjustment of mechanical ventilatory or NIPPV support, as well as discussion of goals of care with nursing staff, respiratory therapy at the bedside and with family at the bedside/via phone.    ESTER

## 2024-03-21 NOTE — PROGRESS NOTES
" [facility]    Cardiology  Progress Note    Patient Name: Ran Reyes Jr.  MRN: [id number]  Admission Date: 3/17/2024  Hospital Length of Stay: 3 days  Code Status: Full Code   Attending Physician: Cassidy Obrien MD   Primary Care Physician: Shiela, Primary Doctor  Expected Discharge Date:   Principal Problem:Scrotal hematoma    Subjective:   Chief Complaint/Reason for Consult: OAC recs     HPI: Ran Reyes Jr. Is a 68 year old male, known to Dr. Wagner, with PMH of  cardiomyopathy, systolic heart failure with an EF of 41%, VHD, chronic AFib on Xarelto, peripheral arterial disease, COPD, HTN, and a large left testicle hydrocele who presented to the ED 3/18/24 for scrotal bleeding with testicular swelling and pain. Found to have sepsis likely due to UTI, acute bronchitis. Also found in ED to be in Afib RVR on EKG, bp 90s/60s. BNP 1273, troponin 0.045 --> 0.055. Patient admits to dyspnea, cough, and wheezing. Denies chest pains. Cardiology being consulted for OAC recommendations.    Hospital Course:  3.19.24: Intubated/Mechanical Ventilation. AF SVR. On Vasopressor Support. Family at bedside.   3.20.24: NAD. Vented/Sedated. PAF/CVR. Dobutamine 2.5mcg/kg/min. Amiodarone 0.5mg/min.   3.21.24: NAD. Extubated. "I am ok." PAF/CVR. Levophed 0.01mcg/kg/min. Oral Amiodarone.      PMH: Cardiomyopathy, systolic heart failure with an EF of 41%, VHD, chronic AFib on Xarelto, peripheral arterial disease, COPD, HTN, CAD (Details Unclear)  PSH: cardiac stent >10 years ago, L FA atherectomy and angioplasty, R SFA stent, cataract extraction,   Family History: Father MI at age 66.   Social History: 10+ pack year hx current smoker, social EtOH, denies drugs.     Previous Cardiac Diagnostics:   Echocardiogram (3.19.24):  Left Ventricle: The left ventricle is normal in size. Increased wall thickness. Unable to assess wall motion. There is moderately reduced systolic function with a visually estimated ejection fraction of " 30 - 40%.  Right Ventricle: Mild right ventricular enlargement.  Left Atrium: Left atrium is moderately dilated.  Right Atrium: Right atrium is severely dilated.  Mitral Valve: There is moderate regurgitation.  Tricuspid Valve: There is mild to moderate regurgitation.  Pulmonary Artery: Pulmonary artery pressure could not be estimated.  IVC/SVC: Patient is ventilated, cannot use IVC diameter to estimate right atrial pressure.    Echocardiogram (1.31.24):   Left Ventricle: The left ventricle is normal in size. Mildly increased wall thickness. There is reduced systolic function. Biplane (2D) method of discs ejection fraction is 41%. Unable to assess diastolic function due to atrial fibrillation.  Right Ventricle: Normal right ventricular cavity size. Systolic function is mildly reduced.  Left Atrium: Left atrium is severely dilated.  Right Atrium: Right atrium is severely dilated.  Aortic Valve: The aortic valve is a trileaflet valve.  Mitral Valve: There is bileaflet sclerosis. There is moderate to severe regurgitation.  Tricuspid Valve: There is mild regurgitation.  IVC/SVC: Normal venous pressure at 3 mmHg.    Carotid US (2.7.23):  The study quality is average.   1-39% stenosis in the proximal right internal carotid artery based on Bluth Criteria. Antegrade right vertebral artery flow.   1-39% stenosis in the proximal left internal carotid artery based on Bluth Criteria. Antegrade left vertebral artery flow.     Echocardiogram (11.8.22):  The study quality is average.   The left ventricle is moderately enlarged. Left ventricular diastolic dimension is 6.4 cms. Global left ventricular systolic function is moderately decreased. The left ventricular ejection fraction is 40%. Arrhythmia noted throughout much of the exam.   There is no evidence of mitral stenosis or prolapse appreciated. MV Ortiz score ~ 5. The area by planimetry is 5.3 cm². The mean trans mitral gradient is 1.6 mmHg. Suspect borderline severe (4+)  mitral regurgitation with a posteriorly directed jet. MR PISA radius ~ 0.93 cm, MR ERO ~ 0.35 cm^2, MR regurgitant volume ~ 72 ml/beat, MR regurgitant fraction ~ 55%, MR vena contracta ~ 0.54 cm.  Mild calcification of the aortic valve is noted with adequate cuspal excursion.   Trace pulmonic regurgitation. Mild (1+) tricuspid regurgitation.    Peripheral Angiogram (10.17.22):  Underwent Successful CSI Atherectomy Balloon Angioplasty and Stenting of the Right SFA and CSI Atherectomy Balloon Angioplasty of Right Anterior Tibial Artery Using Pedal Approach.    Peripheral Angiogram (9.12.22):  Underwent Successful Left SFA Laser Atherectomy Balloon Angioplasty Using Left Radial Artery Approach.    Review of Systems   Constitutional: Positive for malaise/fatigue.   Cardiovascular:  Negative for chest pain and leg swelling.   Respiratory:  Negative for shortness of breath.    Skin:         Scrotal Wound   All other systems reviewed and are negative.    Objective:     Vital Signs (Most Recent):  Temp: 99 °F (37.2 °C) (03/21/24 1239)  Pulse: 76 (03/21/24 0909)  Resp: 14 (03/21/24 0909)  BP: (!) 121/93 (03/21/24 0600)  SpO2: (!) 94 % (03/21/24 1100) Vital Signs (24h Range):  Temp:  [98.6 °F (37 °C)-99.1 °F (37.3 °C)] 99 °F (37.2 °C)  Pulse:  [] 76  Resp:  [14-35] 14  SpO2:  [87 %-100 %] 94 %  BP: ()/(27-93) 121/93   Weight: 90 kg (198 lb 6.6 oz)  Body mass index is 27.67 kg/m².  SpO2: (!) 94 %       Intake/Output Summary (Last 24 hours) at 3/21/2024 1250  Last data filed at 3/21/2024 0608  Gross per 24 hour   Intake 1342.76 ml   Output 1930 ml   Net -587.24 ml       Lines/Drains/Airways       Central Venous Catheter Line  Duration             Percutaneous Central Line - Triple Lumen  03/19/24 0400 Internal Jugular Right 2 days              Drain  Duration                  Urethral Catheter 03/18/24 1426 Straight-tip 16 Fr. 2 days              Peripheral Intravenous Line  Duration                  Peripheral IV -  Single Lumen 03/18/24 1321 20 G Left;Posterior Hand 2 days         Peripheral IV - Single Lumen 03/18/24 1930 20 G Distal;Posterior;Right Forearm 2 days         Peripheral IV - Single Lumen 03/18/24 2300 20 G Distal;Left;Posterior Forearm 2 days                  Significant Labs:   Recent Results (from the past 72 hour(s))   Anaerobic Culture    Collection Time: 03/18/24  3:02 PM    Specimen: Scrotum; Abscess   Result Value Ref Range    Anaerobe Culture No Anaerobes Isolated    Wound Culture    Collection Time: 03/18/24  3:02 PM    Specimen: Scrotum; Abscess   Result Value Ref Range    Wound Culture Many Escherichia coli (A)     Wound Culture Many Streptococcus agalactiae (Group B) (A)    Specimen to Pathology    Collection Time: 03/18/24  3:51 PM   Result Value Ref Range    Pathology Result     Blood Culture    Collection Time: 03/18/24 10:26 PM    Specimen: Blood, Venous   Result Value Ref Range    CULTURE, BLOOD ([facility]) No Growth At 48 Hours    Phosphorus    Collection Time: 03/18/24 10:26 PM   Result Value Ref Range    Phosphorus Level 3.2 2.3 - 4.7 mg/dL   Magnesium    Collection Time: 03/18/24 10:26 PM   Result Value Ref Range    Magnesium Level 2.20 1.60 - 2.60 mg/dL   Comprehensive Metabolic Panel    Collection Time: 03/18/24 10:26 PM   Result Value Ref Range    Sodium Level 133 (L) 136 - 145 mmol/L    Potassium Level 3.3 (L) 3.5 - 5.1 mmol/L    Chloride 99 98 - 107 mmol/L    Carbon Dioxide 18 (L) 23 - 31 mmol/L    Glucose Level 159 (H) 82 - 115 mg/dL    Blood Urea Nitrogen 16.2 8.4 - 25.7 mg/dL    Creatinine 0.94 0.73 - 1.18 mg/dL    Calcium Level Total 7.4 (L) 8.8 - 10.0 mg/dL    Protein Total 6.7 5.8 - 7.6 gm/dL    Albumin Level 2.3 (L) 3.4 - 4.8 g/dL    Globulin 4.4 (H) 2.4 - 3.5 gm/dL    Albumin/Globulin Ratio 0.5 (L) 1.1 - 2.0 ratio    Bilirubin Total 0.8 <=1.5 mg/dL    Alkaline Phosphatase 67 40 - 150 unit/L    Alanine Aminotransferase 16 0 - 55 unit/L    Aspartate Aminotransferase 64 (H) 5 - 34 unit/L     eGFR >60 mls/min/1.73/m2   CBC with Differential    Collection Time: 03/18/24 10:26 PM   Result Value Ref Range    WBC 11.77 (H) 4.50 - 11.50 x10(3)/mcL    RBC 2.56 (L) 4.70 - 6.10 x10(6)/mcL    Hgb 8.2 (L) 14.0 - 18.0 g/dL    Hct 25.1 (L) 42.0 - 52.0 %    MCV 98.0 (H) 80.0 - 94.0 fL    MCH 32.0 (H) 27.0 - 31.0 pg    MCHC 32.7 (L) 33.0 - 36.0 g/dL    RDW 16.4 11.5 - 17.0 %    Platelet 117 (L) 130 - 400 x10(3)/mcL    MPV 13.2 (H) 7.4 - 10.4 fL    Neut % 84.9 %    Lymph % 5.2 %    Mono % 8.2 %    Eos % 0.0 %    Basophil % 0.6 %    Lymph # 0.61 0.6 - 4.6 x10(3)/mcL    Neut # 10.00 (H) 2.1 - 9.2 x10(3)/mcL    Mono # 0.96 0.1 - 1.3 x10(3)/mcL    Eos # 0.00 0 - 0.9 x10(3)/mcL    Baso # 0.07 <=0.2 x10(3)/mcL    IG# 0.13 (H) 0 - 0.04 x10(3)/mcL    IG% 1.1 %    NRBC% 0.0 %    IPF 13.9 (H) 0.9 - 11.2 %   Lactic Acid, Plasma    Collection Time: 03/18/24 10:26 PM   Result Value Ref Range    Lactic Acid Level 3.7 (HH) 0.5 - 2.2 mmol/L   Ferritin    Collection Time: 03/18/24 10:26 PM   Result Value Ref Range    Ferritin Level 1,101.88 (H) 21.81 - 274.66 ng/mL   Vitamin B12    Collection Time: 03/18/24 10:26 PM   Result Value Ref Range    Vitamin B12 Level 1,078 (H) 213 - 816 pg/mL   Folate    Collection Time: 03/18/24 10:26 PM   Result Value Ref Range    Folate Level 10.0 7.0 - 31.4 ng/mL   Iron and TIBC    Collection Time: 03/18/24 10:26 PM   Result Value Ref Range    Iron Binding Capacity Unsaturated 124 69 - 240 ug/dL    Iron Level 27 (L) 65 - 175 ug/dL    Transferrin 96 (L) 163 - 344 mg/dL    Iron Binding Capacity Total 151 (L) 250 - 450 ug/dL    Iron Saturation 18 (L) 20 - 50 %   Reticulocytes    Collection Time: 03/18/24 10:26 PM   Result Value Ref Range    Retic Cnt Auto 1.14 1.1 - 2.1 %    RET# 0.0292 0.026 - 0.095 x10e6/uL   Lipid Panel    Collection Time: 03/18/24 10:26 PM   Result Value Ref Range    Cholesterol Total 46 <=200 mg/dL    HDL Cholesterol <5 (L) 35 - 60 mg/dL    Triglyceride 1,569 (H) 34 - 140 mg/dL     Cholesterol/HDL Ratio     Lactate Dehydrogenase    Collection Time: 03/18/24 10:26 PM   Result Value Ref Range    Lactate Dehydrogenase 269 (H) 125 - 220 U/L   Haptoglobin    Collection Time: 03/18/24 10:26 PM   Result Value Ref Range    Haptoglobin 347 40 - 368 mg/dL   T4, Free    Collection Time: 03/18/24 10:26 PM   Result Value Ref Range    Thyroxine Free 1.30 0.70 - 1.48 ng/dL   TSH    Collection Time: 03/18/24 10:26 PM   Result Value Ref Range    TSH 1.097 0.350 - 4.940 uIU/mL   Hemoglobin A1C    Collection Time: 03/18/24 10:26 PM   Result Value Ref Range    Hemoglobin A1c 5.0 <=7.0 %    Estimated Average Glucose 96.8 mg/dL   PTH, Intact    Collection Time: 03/18/24 10:26 PM   Result Value Ref Range    Parathyroid Hormone Intact 60.0 8.7 - 77.0 pg/mL   Troponin I    Collection Time: 03/18/24 10:26 PM   Result Value Ref Range    Troponin-I 0.058 (H) 0.000 - 0.045 ng/mL   CK    Collection Time: 03/18/24 10:26 PM   Result Value Ref Range    Creatine Kinase 95 30 - 200 U/L   CK-MB    Collection Time: 03/18/24 10:26 PM   Result Value Ref Range    Creatine Kinase MB 2.0 <=7.2 ng/mL   RT Blood Gas    Collection Time: 03/18/24 10:42 PM   Result Value Ref Range    Sample Type Arterial Blood     Sample site Right Radial Artery     Drawn by TDL RRT     pH, Blood gas 7.350 7.350 - 7.450    pCO2, Blood gas 48.0 (H) 35.0 - 45.0 mmHg    pO2, Blood gas 114.0 (H) 80.0 - 100.0 mmHg    Sodium, Blood Gas 134 (L) 137 - 145 mmol/L    Potassium, Blood Gas 2.9 (L) 3.5 - 5.0 mmol/L    Calcium Level Ionized 1.05 (L) 1.12 - 1.23 mmol/L    TOC2, Blood gas 28.0 mmol/L    Base Excess, Blood gas 0.60 -2.00 - 2.00 mmol/L    sO2, Blood gas 98.2 %    HCO3, Blood gas 26.5 (H) 22.0 - 26.0 mmol/L    THb, Blood gas 9.0 (L) 12 - 16 g/dL    O2 Hb, Blood Gas 97.3 (H) 94.0 - 97.0 %    CO Hgb 2.4 (H) 0.5 - 1.5 %    Met Hgb 0.3 (L) 0.4 - 1.5 %    Allens Test Yes     MODE SIMV     FIO2, Blood gas 50 %    Mech Vt 500 ml    Mech RR 18 b/min    PEEP 5.0  cmH2O    PS 10.0 cmH2O   SYPHILIS ANTIBODY (WITH REFLEX RPR)    Collection Time: 03/19/24 12:20 AM   Result Value Ref Range    Syphilis Antibody Nonreactive Nonreactive, Equivocal   Comprehensive metabolic panel    Collection Time: 03/19/24 12:20 AM   Result Value Ref Range    Sodium Level 129 (L) 136 - 145 mmol/L    Potassium Level 3.0 (L) 3.5 - 5.1 mmol/L    Chloride 95 (L) 98 - 107 mmol/L    Carbon Dioxide 18 (L) 23 - 31 mmol/L    Glucose Level 515 (HH) 82 - 115 mg/dL    Blood Urea Nitrogen 18.9 8.4 - 25.7 mg/dL    Creatinine 1.16 0.73 - 1.18 mg/dL    Calcium Level Total 7.7 (L) 8.8 - 10.0 mg/dL    Protein Total 5.7 (L) 5.8 - 7.6 gm/dL    Albumin Level 2.3 (L) 3.4 - 4.8 g/dL    Globulin 3.4 2.4 - 3.5 gm/dL    Albumin/Globulin Ratio 0.7 (L) 1.1 - 2.0 ratio    Bilirubin Total 0.8 <=1.5 mg/dL    Alkaline Phosphatase 75 40 - 150 unit/L    Alanine Aminotransferase 15 0 - 55 unit/L    Aspartate Aminotransferase 32 5 - 34 unit/L    eGFR >60 mls/min/1.73/m2   Phosphorus    Collection Time: 03/19/24 12:20 AM   Result Value Ref Range    Phosphorus Level 3.2 2.3 - 4.7 mg/dL   Magnesium    Collection Time: 03/19/24 12:20 AM   Result Value Ref Range    Magnesium Level 2.30 1.60 - 2.60 mg/dL   Lipase    Collection Time: 03/19/24 12:20 AM   Result Value Ref Range    Lipase Level 5 <=60 U/L   Amylase    Collection Time: 03/19/24 12:20 AM   Result Value Ref Range    Amylase Level 35 25 - 125 unit/L   Beta-Hydroxybutyrate, Serum    Collection Time: 03/19/24 12:20 AM   Result Value Ref Range    Beta Hydroxybutyrate 1.00 (H) <=0.30 mmol/L   CBC with Differential    Collection Time: 03/19/24 12:21 AM   Result Value Ref Range    WBC 9.42 4.50 - 11.50 x10(3)/mcL    RBC 2.82 (L) 4.70 - 6.10 x10(6)/mcL    Hgb 8.9 (L) 14.0 - 18.0 g/dL    Hct 26.9 (L) 42.0 - 52.0 %    MCV 95.4 (H) 80.0 - 94.0 fL    MCH 31.6 (H) 27.0 - 31.0 pg    MCHC 33.1 33.0 - 36.0 g/dL    RDW 16.1 11.5 - 17.0 %    Platelet 134 130 - 400 x10(3)/mcL    MPV 11.9 (H) 7.4 -  10.4 fL    NRBC% 0.0 %    IPF 10.9 0.9 - 11.2 %   Manual Differential    Collection Time: 03/19/24 12:21 AM   Result Value Ref Range    WBC 9.42 x10(3)/mcL    Neutrophils % 92 %    Lymphs % 4 %    Monocytes % 4 %    Neutrophils Abs 8.6664 2.1 - 9.2 x10(3)/mcL    Lymphs Abs 0.3768 (L) 0.6 - 4.6 x10(3)/mcL    Monocytes Abs 0.3768 0.1 - 1.3 x10(3)/mcL    Platelets Normal Normal, Adequate    RBC Morph Abnormal (A) Normal    Anisocytosis 1+ (A) (none)    Macrocytosis 1+ (A) (none)   HIV 1/2 Ag/Ab (4th Gen)    Collection Time: 03/19/24 12:29 AM   Result Value Ref Range    HIV Nonreactive Nonreactive   Hepatitis Panel, Acute    Collection Time: 03/19/24 12:29 AM   Result Value Ref Range    Hepatitis A IgM Nonreactive Nonreactive    Hepatitis B Core IgM Nonreactive Nonreactive    Hepatitis B Surface Antigen Nonreactive Nonreactive    Hep C Ab Interp Nonreactive Nonreactive   Pathologist Interpretation    Collection Time: 03/19/24 12:29 AM   Result Value Ref Range    Pathology Review       No serological evidence of recent or past hepatitis A, B, or C infection.    Mateo Burden M.D.    Echo Saline Bubble? Yes    Collection Time: 03/19/24 12:29 AM   Result Value Ref Range    Quintana's Biplane MOD Ejection Fraction 39 %    LVOT stroke volume 46.73 cm3    LVIDd 5.61 3.5 - 6.0 cm    LV Systolic Volume 98.80 mL    LVIDs 4.63 (A) 2.1 - 4.0 cm    LV Diastolic Volume 154.00 mL    IVS 1.16 (A) 0.6 - 1.1 cm    LVOT diameter 2.20 cm    LVOT area 3.8 cm2    FS 17 (A) 28 - 44 %    Left Ventricle Relative Wall Thickness 0.42 cm    Posterior Wall 1.19 (A) 0.6 - 1.1 cm    LV mass 273.33 g    MV Peak E Stuart 1.18 m/s    TDI LATERAL 0.10 m/s    TDI SEPTAL 0.07 m/s    E/E' ratio 13.88 m/s    MV Peak A Stuart 0.25 m/s    TR Max Stuart 2.38 m/s    E/A ratio 4.72     E wave deceleration time 209.00 msec    LV SEPTAL E/E' RATIO 16.86 m/s    LV LATERAL E/E' RATIO 11.80 m/s    LVOT peak stuart 0.74 m/s    Left Ventricular Outflow Tract Mean Velocity 0.45  cm/s    Left Ventricular Outflow Tract Mean Gradient 1.00 mmHg    RV mid diameter 3.60 cm    RV S' 11.00 cm/s    TAPSE 2.28 cm    LA size 4.90 cm    LA volume (mod) 117.00 cm3    RA Major Axis 6.54 cm    RA Width 6.18 cm    AV mean gradient 3 mmHg    AV peak gradient 7 mmHg    Ao peak trevon 1.28 m/s    Ao VTI 21.70 cm    LVOT peak VTI 12.30 cm    AV valve area 2.15 cm²    AV Velocity Ratio 0.58     AV index (prosthetic) 0.57     HODAN by Velocity Ratio 2.20 cm²    Mr max trevon 4.63 m/s    MV mean gradient 3 mmHg    MV peak gradient 5 mmHg    MV stenosis pressure 1/2 time 45.00 ms    MV valve area p 1/2 method 4.89 cm2    MV valve area by continuity eq 2.45 cm2    MV VTI 19.1 cm    Triscuspid Valve Regurgitation Peak Gradient 23 mmHg    PV PEAK VELOCITY 1.25 m/s    PV peak gradient 6 mmHg    Mean e' 0.09 m/s   Drug Screen, Urine    Collection Time: 03/19/24  1:28 AM   Result Value Ref Range    Amphetamines, Urine Negative Negative    Barbituates, Urine Negative Negative    Benzodiazepine, Urine Negative Negative    Cannabinoids, Urine Negative Negative    Cocaine, Urine Negative Negative    Fentanyl, Urine Positive (A) Negative    MDMA, Urine Negative Negative    Opiates, Urine Positive (A) Negative    Phencyclidine, Urine Negative Negative    pH, Urine 6.0 3.0 - 11.0    Specific Gravity, Urine Auto 1.027 1.001 - 1.035   Urinalysis, Reflex to Urine Culture    Collection Time: 03/19/24  1:28 AM    Specimen: Urine   Result Value Ref Range    Color, UA Yellow Yellow, Light-Yellow, Colorless, Straw, Dark-Yellow    Appearance, UA Clear Clear    Specific Gravity, UA 1.027 1.005 - 1.030    pH, UA 6.0 5.0 - 8.5    Protein, UA 1+ (A) Negative    Glucose, UA 3+ (A) Negative, Normal    Ketones, UA 1+ (A) Negative    Blood, UA 1+ (A) Negative    Bilirubin, UA Negative Negative    Urobilinogen, UA Normal 0.2, 1.0, Normal    Nitrites, UA Negative Negative    Leukocyte Esterase,  (A) Negative    WBC, UA 51-99 (A) None Seen, 0-2,  3-5, 0-5 /HPF    Bacteria, UA Trace None Seen, Trace /HPF    Squamous Epithelial Cells, UA Trace None Seen /HPF    Mucous, UA Trace (A) None Seen /LPF    RBC, UA 0-5 None Seen, 0-2, 3-5, 0-5 /HPF   Chlamydia/GC, PCR    Collection Time: 03/19/24  1:28 AM    Specimen: Urine   Result Value Ref Range    Chlamydia trachomatis PCR Not Detected Not Detected    N. gonorrhea PCR Not Detected Not Detected    Source Urine    POCT glucose    Collection Time: 03/19/24  2:04 AM   Result Value Ref Range    POCT Glucose 239 (H) 70 - 110 mg/dL   MRSA PCR    Collection Time: 03/19/24  2:13 AM   Result Value Ref Range    MRSA PCR SCRN ([facility]) Not Detected Not Detected   Respiratory Panel    Collection Time: 03/19/24  2:13 AM   Result Value Ref Range    Adenovirus Not Detected Not Detected    Coronavirus 229E Not Detected Not Detected    Coronavirus HKU1 Not Detected Not Detected    Coronavirus NL63 Not Detected Not Detected    Coronavirus OC43 PCR, Common Cold Virus Not Detected Not Detected    Human Metapneumovirus Detected (A) Not Detected    Parainfluenza Virus 1 Not Detected Not Detected    Parainfluenza Virus 2 Not Detected Not Detected    Parainfluenza Virus 3 Not Detected Not Detected    Parainfluenza Virus 4 Not Detected Not Detected    Bordetella pertussis (ptxP) Not Detected Not Detected    Chlamydia pneumoniae Not Detected Not Detected    Mycoplasma pneumoniae Not Detected Not Detected    Human Rhinovirus/Enterovirus Not Detected Not Detected    Bordetella parapertussis ([id number]) Not Detected Not Detected   COVID/RSV/FLU A&B PCR    Collection Time: 03/19/24  2:13 AM   Result Value Ref Range    Influenza A PCR Not Detected Not Detected    Influenza B PCR Not Detected Not Detected    Respiratory Syncytial Virus PCR Not Detected Not Detected    SARS-CoV-2 PCR Not Detected Not Detected, Negative   Comprehensive metabolic panel    Collection Time: 03/19/24  5:37 AM   Result Value Ref Range    Sodium Level 137 136 - 145 mmol/L     Potassium Level 4.2 3.5 - 5.1 mmol/L    Chloride 101 98 - 107 mmol/L    Carbon Dioxide 25 23 - 31 mmol/L    Glucose Level 217 (H) 82 - 115 mg/dL    Blood Urea Nitrogen 15.3 8.4 - 25.7 mg/dL    Creatinine 0.83 0.73 - 1.18 mg/dL    Calcium Level Total 8.1 (L) 8.8 - 10.0 mg/dL    Protein Total 5.2 (L) 5.8 - 7.6 gm/dL    Albumin Level 2.3 (L) 3.4 - 4.8 g/dL    Globulin 2.9 2.4 - 3.5 gm/dL    Albumin/Globulin Ratio 0.8 (L) 1.1 - 2.0 ratio    Bilirubin Total 0.5 <=1.5 mg/dL    Alkaline Phosphatase 61 40 - 150 unit/L    Alanine Aminotransferase 16 0 - 55 unit/L    Aspartate Aminotransferase 28 5 - 34 unit/L    eGFR >60 mls/min/1.73/m2   Magnesium    Collection Time: 03/19/24  5:37 AM   Result Value Ref Range    Magnesium Level 2.40 1.60 - 2.60 mg/dL   Lactic Acid, Plasma    Collection Time: 03/19/24  5:37 AM   Result Value Ref Range    Lactic Acid Level 1.3 0.5 - 2.2 mmol/L   Phosphorus    Collection Time: 03/19/24  5:37 AM   Result Value Ref Range    Phosphorus Level 3.3 2.3 - 4.7 mg/dL   CBC with Differential    Collection Time: 03/19/24  5:37 AM   Result Value Ref Range    WBC 9.93 4.50 - 11.50 x10(3)/mcL    RBC 2.88 (L) 4.70 - 6.10 x10(6)/mcL    Hgb 8.7 (L) 14.0 - 18.0 g/dL    Hct 27.5 (L) 42.0 - 52.0 %    MCV 95.5 (H) 80.0 - 94.0 fL    MCH 30.2 27.0 - 31.0 pg    MCHC 31.6 (L) 33.0 - 36.0 g/dL    RDW 16.1 11.5 - 17.0 %    Platelet 151 130 - 400 x10(3)/mcL    MPV 11.9 (H) 7.4 - 10.4 fL    NRBC% 0.0 %   Triglycerides    Collection Time: 03/19/24  5:37 AM   Result Value Ref Range    Triglyceride 81 34 - 140 mg/dL   Manual Differential    Collection Time: 03/19/24  5:37 AM   Result Value Ref Range    WBC 9.93 x10(3)/mcL    Neutrophils % 88 %    Lymphs % 5 %    Monocytes % 6 %    Metamyelocytes % 1 (H) <=0 %    Neutrophils Abs 8.7384 2.1 - 9.2 x10(3)/mcL    Lymphs Abs 0.4965 (L) 0.6 - 4.6 x10(3)/mcL    Monocytes Abs 0.5958 0.1 - 1.3 x10(3)/mcL    Platelets Normal Normal, Adequate    RBC Morph Abnormal (A) Normal     Poikilocytosis 1+ (A) (none)    Anisocytosis 1+ (A) (none)    Macrocytosis 1+ (A) (none)    Giant Platelets 1+    RT Blood Gas    Collection Time: 03/19/24  5:40 AM   Result Value Ref Range    Sample Type Arterial Blood     Sample site Left Radial Artery     Drawn by sd rrt     pH, Blood gas 7.440 7.350 - 7.450    pCO2, Blood gas 45.0 35.0 - 45.0 mmHg    pO2, Blood gas 96.0 80.0 - 100.0 mmHg    Sodium, Blood Gas 134 (L) 137 - 145 mmol/L    Potassium, Blood Gas 3.7 3.5 - 5.0 mmol/L    Calcium Level Ionized 1.11 (L) 1.12 - 1.23 mmol/L    TOC2, Blood gas 32.0 mmol/L    Base Excess, Blood gas 5.60 mmol/L    sO2, Blood gas 98.0 %    HCO3, Blood gas 30.6 (H) 22.0 - 26.0 mmol/L    Allens Test Yes     MODE SIMV     Oxygen Device, Blood gas Ventilator     FIO2, Blood gas 30 %    Mech Vt 500 ml    Mech RR 18 b/min    PEEP 5.0 cmH2O    PS 10.0 cmH2O   EKG 12-lead    Collection Time: 03/19/24  7:25 AM   Result Value Ref Range    QRS Duration 98 ms    [facility] QTC Calculation 573 ms   VANCOMYCIN, TROUGH    Collection Time: 03/19/24 10:29 AM   Result Value Ref Range    Vancomycin Trough 10.0 (L) 15.0 - 20.0 ug/ml   Triglycerides    Collection Time: 03/19/24 10:29 AM   Result Value Ref Range    Triglyceride 85 34 - 140 mg/dL   Lactic Acid, Plasma    Collection Time: 03/19/24 10:29 AM   Result Value Ref Range    Lactic Acid Level 0.9 0.5 - 2.2 mmol/L   POCT glucose    Collection Time: 03/19/24 10:55 AM   Result Value Ref Range    POCT Glucose 202 (H) 70 - 110 mg/dL   POCT glucose    Collection Time: 03/19/24  4:07 PM   Result Value Ref Range    POCT Glucose 182 (H) 70 - 110 mg/dL   Magnesium    Collection Time: 03/19/24  5:42 PM   Result Value Ref Range    Magnesium Level 2.30 1.60 - 2.60 mg/dL   Phosphorus    Collection Time: 03/19/24  5:42 PM   Result Value Ref Range    Phosphorus Level 2.2 (L) 2.3 - 4.7 mg/dL   Basic Metabolic Panel    Collection Time: 03/19/24  5:42 PM   Result Value Ref Range    Sodium Level 137 136 - 145 mmol/L     Potassium Level 3.4 (L) 3.5 - 5.1 mmol/L    Chloride 101 98 - 107 mmol/L    Carbon Dioxide 24 23 - 31 mmol/L    Glucose Level 157 (H) 82 - 115 mg/dL    Blood Urea Nitrogen 16.2 8.4 - 25.7 mg/dL    Creatinine 0.81 0.73 - 1.18 mg/dL    BUN/Creatinine Ratio 20     Calcium Level Total 7.6 (L) 8.8 - 10.0 mg/dL    Anion Gap 12.0 mEq/L    eGFR >60 mls/min/1.73/m2   POCT glucose    Collection Time: 03/19/24  9:26 PM   Result Value Ref Range    POCT Glucose 154 (H) 70 - 110 mg/dL   Troponin I    Collection Time: 03/20/24  2:06 AM   Result Value Ref Range    Troponin-I 0.040 0.000 - 0.045 ng/mL   Lactic Acid, Plasma    Collection Time: 03/20/24  2:06 AM   Result Value Ref Range    Lactic Acid Level 1.8 0.5 - 2.2 mmol/L   Phosphorus    Collection Time: 03/20/24  2:06 AM   Result Value Ref Range    Phosphorus Level 2.8 2.3 - 4.7 mg/dL   Magnesium    Collection Time: 03/20/24  2:06 AM   Result Value Ref Range    Magnesium Level 2.30 1.60 - 2.60 mg/dL   Comprehensive metabolic panel    Collection Time: 03/20/24  2:06 AM   Result Value Ref Range    Sodium Level 136 136 - 145 mmol/L    Potassium Level 3.7 3.5 - 5.1 mmol/L    Chloride 101 98 - 107 mmol/L    Carbon Dioxide 25 23 - 31 mmol/L    Glucose Level 127 (H) 82 - 115 mg/dL    Blood Urea Nitrogen 15.2 8.4 - 25.7 mg/dL    Creatinine 0.84 0.73 - 1.18 mg/dL    Calcium Level Total 7.4 (L) 8.8 - 10.0 mg/dL    Protein Total 4.8 (L) 5.8 - 7.6 gm/dL    Albumin Level 2.0 (L) 3.4 - 4.8 g/dL    Globulin 2.8 2.4 - 3.5 gm/dL    Albumin/Globulin Ratio 0.7 (L) 1.1 - 2.0 ratio    Bilirubin Total 0.4 <=1.5 mg/dL    Alkaline Phosphatase 55 40 - 150 unit/L    Alanine Aminotransferase 12 0 - 55 unit/L    Aspartate Aminotransferase 17 5 - 34 unit/L    eGFR >60 mls/min/1.73/m2   Triglycerides    Collection Time: 03/20/24  2:06 AM   Result Value Ref Range    Triglyceride 102 34 - 140 mg/dL   CBC with Differential    Collection Time: 03/20/24  2:06 AM   Result Value Ref Range    WBC 12.28 (H) 4.50 -  11.50 x10(3)/mcL    RBC 2.72 (L) 4.70 - 6.10 x10(6)/mcL    Hgb 8.5 (L) 14.0 - 18.0 g/dL    Hct 25.7 (L) 42.0 - 52.0 %    MCV 94.5 (H) 80.0 - 94.0 fL    MCH 31.3 (H) 27.0 - 31.0 pg    MCHC 33.1 33.0 - 36.0 g/dL    RDW 16.3 11.5 - 17.0 %    Platelet 154 130 - 400 x10(3)/mcL    MPV 11.4 (H) 7.4 - 10.4 fL    NRBC% 0.0 %   Manual Differential    Collection Time: 03/20/24  2:06 AM   Result Value Ref Range    WBC 12.28 x10(3)/mcL    Neutrophils % 77 %    Lymphs % 13 %    Monocytes % 8 %    Promyelocytes % 1 (H) <=0 %    Plasmacytes % 1 %    nRBC % 1 %    Neutrophils Abs 9.4556 (H) 2.1 - 9.2 x10(3)/mcL    Lymphs Abs 1.5964 0.6 - 4.6 x10(3)/mcL    Monocytes Abs 0.9824 0.1 - 1.3 x10(3)/mcL    Platelets Normal Normal, Adequate    RBC Morph Abnormal (A) Normal    Poikilocytosis 2+ (A) (none)    Anisocytosis 1+ (A) (none)    Macrocytosis 1+ (A) (none)    Yoel Cells 1+ (A) (none)   RT Blood Gas    Collection Time: 03/20/24  5:39 AM   Result Value Ref Range    Sample Type Arterial Blood     Sample site Left Radial Artery     Drawn by sd rrt     pH, Blood gas 7.510 (H) 7.350 - 7.450    pCO2, Blood gas 41.0 35.0 - 45.0 mmHg    pO2, Blood gas 73.0 (L) 80.0 - 100.0 mmHg    Sodium, Blood Gas 134 (L) 137 - 145 mmol/L    Potassium, Blood Gas 3.5 3.5 - 5.0 mmol/L    Calcium Level Ionized 1.05 (L) 1.12 - 1.23 mmol/L    TOC2, Blood gas 34.0 mmol/L    Base Excess, Blood gas 8.80 mmol/L    sO2, Blood gas 96.0 %    HCO3, Blood gas 32.7 (H) 22.0 - 26.0 mmol/L    Allens Test Yes     MODE SIMV     Oxygen Device, Blood gas Ventilator     FIO2, Blood gas 30 %    Mech Vt 500 ml    Mech RR 18 b/min    PEEP 5.0 cmH2O    PS 10.0 cmH2O   Respiratory Culture    Collection Time: 03/20/24  9:40 AM    Specimen: Sputum, Expectorated   Result Value Ref Range    Respiratory Culture Moderate Yeast (A)     GRAM STAIN Quality 2+     GRAM STAIN No bacteria seen    RT Blood Gas    Collection Time: 03/20/24  7:39 PM   Result Value Ref Range    Sample Type Arterial  Blood     Sample site Right Radial Artery     Drawn by RCL CRT     pH, Blood gas 7.500 (H) 7.350 - 7.450    pCO2, Blood gas 45.0 35.0 - 45.0 mmHg    pO2, Blood gas 52.0 (LL) 80.0 - 100.0 mmHg    Sodium, Blood Gas 137 137 - 145 mmol/L    Potassium, Blood Gas 3.1 (L) 3.5 - 5.0 mmol/L    Calcium Level Ionized 1.08 (L) 1.12 - 1.23 mmol/L    TOC2, Blood gas 36.5 mmol/L    Base Excess, Blood gas 10.60 mmol/L    sO2, Blood gas 90.0 %    HCO3, Blood gas 35.1 (H) 22.0 - 26.0 mmol/L    Allens Test Yes     Oxygen Device, Blood gas Cannula     LPM 6     FIO2, Blood gas 44 %   Triglycerides    Collection Time: 03/21/24  2:19 AM   Result Value Ref Range    Triglyceride 104 34 - 140 mg/dL   Comprehensive metabolic panel    Collection Time: 03/21/24  2:19 AM   Result Value Ref Range    Sodium Level 140 136 - 145 mmol/L    Potassium Level 3.4 (L) 3.5 - 5.1 mmol/L    Chloride 101 98 - 107 mmol/L    Carbon Dioxide 29 23 - 31 mmol/L    Glucose Level 104 82 - 115 mg/dL    Blood Urea Nitrogen 10.6 8.4 - 25.7 mg/dL    Creatinine 0.79 0.73 - 1.18 mg/dL    Calcium Level Total 7.7 (L) 8.8 - 10.0 mg/dL    Protein Total 5.0 (L) 5.8 - 7.6 gm/dL    Albumin Level 2.1 (L) 3.4 - 4.8 g/dL    Globulin 2.9 2.4 - 3.5 gm/dL    Albumin/Globulin Ratio 0.7 (L) 1.1 - 2.0 ratio    Bilirubin Total 0.5 <=1.5 mg/dL    Alkaline Phosphatase 64 40 - 150 unit/L    Alanine Aminotransferase 11 0 - 55 unit/L    Aspartate Aminotransferase 21 5 - 34 unit/L    eGFR >60 mls/min/1.73/m2   Magnesium    Collection Time: 03/21/24  2:19 AM   Result Value Ref Range    Magnesium Level 2.20 1.60 - 2.60 mg/dL   Phosphorus    Collection Time: 03/21/24  2:19 AM   Result Value Ref Range    Phosphorus Level 1.7 (L) 2.3 - 4.7 mg/dL   CBC with Differential    Collection Time: 03/21/24  2:20 AM   Result Value Ref Range    WBC 9.75 4.50 - 11.50 x10(3)/mcL    RBC 2.80 (L) 4.70 - 6.10 x10(6)/mcL    Hgb 8.6 (L) 14.0 - 18.0 g/dL    Hct 27.2 (L) 42.0 - 52.0 %    MCV 97.1 (H) 80.0 - 94.0 fL     MCH 30.7 27.0 - 31.0 pg    MCHC 31.6 (L) 33.0 - 36.0 g/dL    RDW 16.0 11.5 - 17.0 %    Platelet 199 130 - 400 x10(3)/mcL    MPV 11.1 (H) 7.4 - 10.4 fL    Neut % 72.6 %    Lymph % 13.8 %    Mono % 8.6 %    Eos % 0.3 %    Basophil % 0.7 %    Lymph # 1.35 0.6 - 4.6 x10(3)/mcL    Neut # 7.07 2.1 - 9.2 x10(3)/mcL    Mono # 0.84 0.1 - 1.3 x10(3)/mcL    Eos # 0.03 0 - 0.9 x10(3)/mcL    Baso # 0.07 <=0.2 x10(3)/mcL    IG# 0.39 (H) 0 - 0.04 x10(3)/mcL    IG% 4.0 %    NRBC% 0.2 %   Occult Blood, Stool 1st Specimen    Collection Time: 03/21/24  9:39 AM   Result Value Ref Range    Stool Color 1 Green     Stool Consistancy 1 Mucoid     Occult Blood Stool 1 Positive (A) Negative   VANCOMYCIN, TROUGH    Collection Time: 03/21/24  9:51 AM   Result Value Ref Range    Vancomycin Trough 24.3 (H) 15.0 - 20.0 ug/ml     Telemetry: PAF/CVR    Physical Exam  Vitals reviewed.   Constitutional:       General: He is not in acute distress.     Appearance: Normal appearance. He is ill-appearing.   HENT:      Mouth/Throat:      Mouth: Mucous membranes are moist.   Eyes:      Extraocular Movements: Extraocular movements intact.      Conjunctiva/sclera: Conjunctivae normal.   Cardiovascular:      Rate and Rhythm: Normal rate. Rhythm irregular.      Heart sounds: Murmur heard.   Pulmonary:      Effort: Pulmonary effort is normal. No respiratory distress.      Breath sounds: Rhonchi present.      Comments: NC O2  Genitourinary:     Comments: Scrotal Sling in Place, Dressing C/D/I  Musculoskeletal:      Right lower leg: No edema.      Left lower leg: No edema.   Neurological:      General: No focal deficit present.      Mental Status: He is alert and oriented to person, place, and time.   Psychiatric:         Mood and Affect: Mood normal.         Behavior: Behavior normal.       Current Inpatient Medications:    Current Facility-Administered Medications:     acetaminophen tablet 650 mg, 650 mg, Oral, Q8H PRN, Harris Hernandez MD    amiodarone  tablet 400 mg, 400 mg, Oral, BID, 400 mg at 03/21/24 0900 **FOLLOWED BY** [START ON 3/25/2024] amiodarone tablet 200 mg, 200 mg, Oral, BID **FOLLOWED BY** [START ON 3/31/2024] amiodarone tablet 200 mg, 200 mg, Oral, Daily, Joshua Hernandez, ANP    atorvastatin tablet 80 mg, 80 mg, Oral, Daily, David Gonsalez MD, 80 mg at 03/21/24 1031    chlorhexidine 0.12 % solution 15 mL, 15 mL, Mouth/Throat, BID, David Gonsalez MD, 15 mL at 03/21/24 0900    dexmedetomidine (PRECEDEX) 400mcg/100mL 0.9% NaCL infusion, 0-1.4 mcg/kg/hr, Intravenous, Continuous, David Gonsalez MD, Stopped at 03/20/24 0939    dextrose 10 % infusion, , Intravenous, PRN, David Gonsalez MD    dextrose 10 % infusion, , Intravenous, PRN, David Gonsalez MD    dextrose 10% bolus 125 mL 125 mL, 12.5 g, Intravenous, PRN, David Gonsalez MD    dextrose 10% bolus 250 mL 250 mL, 25 g, Intravenous, PRN, David Gonsalez MD    folic acid tablet 1 mg, 1 mg, Oral, Daily, Jenna Miller MD, 1 mg at 03/21/24 1030    insulin aspart U-100 injection 0-10 Units, 0-10 Units, Subcutaneous, Q6H PRN, Narendra Pradhan DO    ipratropium 0.02 % nebulizer solution 0.5 mg, 0.5 mg, Nebulization, Q6H PRN, David Gonsalez MD    levalbuterol nebulizer solution 1.25 mg, 1.25 mg, Nebulization, Q6H, Narendra Pradhan DO, 1.25 mg at 03/21/24 0909    LORazepam tablet 2 mg, 2 mg, Oral, Q4H PRN, Jenna Miller MD, 2 mg at 03/21/24 0031    melatonin tablet 6 mg, 6 mg, Oral, Nightly PRN, Harris Hernandez MD    midodrine tablet 10 mg, 10 mg, Oral, TID WM, Narendra Pradhan, DO, 10 mg at 03/21/24 1226    morphine injection 2 mg, 2 mg, Intravenous, Q6H PRN, David Gonsalez MD, 2 mg at 03/20/24 2014    multivitamin tablet, 1 tablet, Oral, Daily, Jenna Miller MD, 1 tablet at 03/21/24 1031    NORepinephrine 8 mg in dextrose 5% 250 mL infusion, 0-3 mcg/kg/min, Intravenous, Continuous, David Gonsalez MD, Last Rate: 1.7 mL/hr at 03/21/24 0608, 0.01  mcg/kg/min at 03/21/24 0608    oxyCODONE immediate release tablet 5 mg, 5 mg, Oral, Q4H PRN, Harris Hernandez MD, 5 mg at 03/21/24 0129    pantoprazole injection 40 mg, 40 mg, Intravenous, BID, Cassidy Obrien MD    piperacillin-tazobactam (ZOSYN) 4.5 g in dextrose 5 % in water (D5W) 100 mL IVPB (MB+), 4.5 g, Intravenous, Q8H, Harris Hernandez MD, Stopped at 03/21/24 1028    scopolamine 1.3-1.5 mg (1 mg over 3 days) 1 patch, 1 patch, Transdermal, Q3 Days, Joe Clarke MD, 1 patch at 03/20/24 1424    sodium chloride 0.9% flush 10 mL, 10 mL, Intravenous, PRN, Narendra Pradhan, DO    sodium chloride 0.9% flush 10 mL, 10 mL, Intravenous, PRN, David Gonsalez MD    thiamine (B-1) 500 mg in dextrose 5 % (D5W) 100 mL IVPB, 500 mg, Intravenous, TID, David Gonsalez MD, Stopped at 03/21/24 1105    Pharmacy to dose Vancomycin consult, , , Once **AND** vancomycin - pharmacy to dose, , Intravenous, pharmacy to manage frequency, Harris Hernandez MD    vancomycin 1.25 g in dextrose 5% 250 mL IVPB (ready to mix), 1,250 mg, Intravenous, Once, Cassidy Obrien MD    Facility-Administered Medications Ordered in Other Encounters:     0.9%  NaCl infusion, , Intravenous, Continuous, Shannon Milligan MD, Last Rate: 10 mL/hr at 03/09/23 1020, New Bag at 03/09/23 1020    diphenhydrAMINE injection 25 mg, 25 mg, Intravenous, Once PRN, Shannon Milligan MD    LIDOcaine (PF) 10 mg/ml (1%) injection 10 mg, 1 mL, Intradermal, Once, Lanette Ulloa FNP    LIDOcaine (PF) 10 mg/ml (1%) injection 10 mg, 1 mL, Intradermal, Once, Shannon Milligan MD    ondansetron injection 4 mg, 4 mg, Intravenous, Once, Shannon Milligan MD    prochlorperazine injection Soln 5 mg, 5 mg, Intravenous, Once PRN, Shannon Milligan MD  VTE Risk Mitigation (From admission, onward)           Ordered     IP VTE LOW RISK PATIENT  Once         03/19/24 0422     Place sequential compression device  Until discontinued         03/18/24  0124                  Assessment:   Cardiac Arrest/VT (Post Operative Left Héctor Orchiectomy - in PACU 3.18.24)     - Requiring Multiple Defibrillations (x 3)    - On Amiodarone Infusion  Chronic Atrial Fibrillation- Now AF CVR (HR 50's-60s with Intermittent PVCS)    - Xarelto on Hold Post Scrotal Abscess I&D  NSTEMI (Unspecified for Now)  Acute Hypoxemic Respiratory Failure requiring Intubation/Ventilation - Now Extubated on NC O2  Hypotension Requiring Vasopressor Support    - History of Hypertension  Valvular Heart Disease    - MR: Moderate, TR: Mild to Moderate  Hyperlipidemia    - On Statin  Sepsis - Likely UTI Related   Scrotal Abscess    - Status Post Surgical Exploration, Left Orchiectomy, & Debridement of Wound/Wound Packing (3.18.24)   COPD  Long Term Oral Anticoagulation  Cardiomyopathy (Unspecified)    - EF 30-40%  CAD (Details Unclear)  PAD  Infrarenal Abdominal Aortic Aneurysmal Dilatation with Mural Thrombus (Stable)    - Maximum diameter is 3.6 cm without significant interval change   Anemia  Acute Human Metapneumovirus Infection (On Droplet Precautions)    Plan:   Continue Oral Amiodarone Load  Wean Pressors for MAP > 65mmHg  Keep K > 4.0 and Mg > 2.0   Antibiotic Management as per Primary Team  Vent Management as per ICU Team  Consider Ischemic Evaluation once acute medical issues stabilize, and cleared for anticoagulation  Will Continue to Follow  Labs in AM: CBC, CMP and Mg    RAN Kim  Cardiology  [facility]  03/21/2024   I agree with the findings of the complexity of problems addressed and take responsibility for the plan's risks and complications. I approved the plan documented by Joshua Hernandez NP.

## 2024-03-21 NOTE — PROGRESS NOTES
Inpatient Nutrition Assessment    Admit Date: 3/17/2024   Total duration of encounter: 4 days   Patient Age: 68 y.o.    Nutrition Recommendation/Prescription     Advance diet when appropriate. Goal diet: cardiac.  Add Boost Plus (provides 360 kcal, 14 g protein per serving) TID (chocolate)  Add Ge (provides 90 kcal, 2.5 g protein per serving) BID.    Communication of Recommendations: reviewed with nurse    Nutrition Assessment     Malnutrition Assessment/Nutrition-Focused Physical Exam    Malnutrition Context: acute illness or injury (03/19/24 1033)  Malnutrition Level:  (does not meet criteria) (03/19/24 1033)  Energy Intake (Malnutrition):  (unable to eval) (03/19/24 1033)  Weight Loss (Malnutrition):  (unable to eval) (03/19/24 1033)  Subcutaneous Fat (Malnutrition):  (does not meet criteria) (03/19/24 1033)           Muscle Mass (Malnutrition):  (does not meet criteria) (03/19/24 1033)                          Fluid Accumulation (Malnutrition):  (does not meet criteria) (03/19/24 1033)        A minimum of two characteristics is recommended for diagnosis of either severe or non-severe malnutrition.    Chart Review    Reason Seen: continuous nutrition monitoring and follow-up    Malnutrition Screening Tool Results   Have you recently lost weight without trying?: No  Have you been eating poorly because of a decreased appetite?: No   MST Score: 0   Diagnosis:  Scrotal abscess s/p I&D with left joselito orchiectomy 03/18/2024  Recent cardiac arrest  Sepsis  Chronic atrial fibrillation  Partial bowel obstruction  HFrEF     Relevant Medical History: COPD, HTN, HLD    Scheduled Medications:  amiodarone, 400 mg, BID   Followed by  [START ON 3/25/2024] amiodarone, 200 mg, BID   Followed by  [START ON 3/31/2024] amiodarone, 200 mg, Daily  atorvastatin, 80 mg, Daily  chlorhexidine, 15 mL, BID  enoxparin, 1 mg/kg, Q12H (prophylaxis, 0900/2100)  famotidine (PF), 20 mg, BID  folic acid, 1 mg, Daily  levalbuterol, 1.25 mg,  Q6H  midodrine, 10 mg, TID WM  multivitamin, 1 tablet, Daily  piperacillin-tazobactam (Zosyn) IV (PEDS and ADULTS) (extended infusion is not appropriate), 4.5 g, Q8H  potassium phosphate IVPB, 30 mmol, Once  scopolamine, 1 patch, Q3 Days  thiamine (B-1) 500 mg in dextrose 5 % (D5W) 100 mL IVPB, 500 mg, TID  vancomycin (VANCOCIN) IV (PEDS and ADULTS), 1,250 mg, Once    Continuous Infusions:  dexmedeTOMIDine (Precedex) infusion (titrating), Last Rate: Stopped (03/20/24 0939)  NORepinephrine bitartrate-D5W, Last Rate: 0.01 mcg/kg/min (03/21/24 0608)    PRN Medications: acetaminophen, dextrose 10 % in water (D10W), dextrose 10 % in water (D10W), dextrose 10%, dextrose 10%, insulin aspart U-100, ipratropium, LORazepam, melatonin, morphine, oxyCODONE, sodium chloride 0.9%, sodium chloride 0.9%, Pharmacy to dose Vancomycin consult **AND** vancomycin - pharmacy to dose    Calorie Containing IV Medications: no significant kcals from medications at this time    Recent Labs   Lab 03/17/24  2103 03/18/24  0533 03/18/24  2226 03/19/24  0020 03/19/24  0021 03/19/24  0537 03/19/24  1029 03/19/24  1742 03/20/24  0206 03/21/24  0219 03/21/24  0220    137 133* 129*  --  137  --  137 136 140  --    K 3.5 3.3* 3.3* 3.0*  --  4.2  --  3.4* 3.7 3.4*  --    CALCIUM 7.4* 7.4* 7.4* 7.7*  --  8.1*  --  7.6* 7.4* 7.7*  --    PHOS  --   --  3.2 3.2  --  3.3  --  2.2* 2.8 1.7*  --    MG 2.20  --  2.20 2.30  --  2.40  --  2.30 2.30 2.20  --    CHLORIDE 101 99 99 95*  --  101  --  101 101 101  --    CO2 21* 28 18* 18*  --  25  --  24 25 29  --    BUN 16.6 16.3 16.2 18.9  --  15.3  --  16.2 15.2 10.6  --    CREATININE 0.99 0.87 0.94 1.16  --  0.83  --  0.81 0.84 0.79  --    EGFRNORACEVR >60 >60 >60 >60  --  >60  --  >60 >60 >60  --    GLUCOSE 99 99 159* 515*  --  217*  --  157* 127* 104  --    BILITOT 1.0 0.8 0.8 0.8  --  0.5  --   --  0.4 0.5  --    ALKPHOS 76 73 67 75  --  61  --   --  55 64  --    ALT 27 21 16 15  --  16  --   --  12 11   "--    * 67* 64* 32  --  28  --   --  17 21  --    ALBUMIN 2.1* 2.0* 2.3* 2.3*  --  2.3*  --   --  2.0* 2.1*  --    TRIG  --   --  1,569*  --   --  81 85  --  102 104  --    HGBA1C  --   --  5.0  --   --   --   --   --   --   --   --    LIPASE  --   --   --  5  --   --   --   --   --   --   --    AMYLASE  --   --   --  35  --   --   --   --   --   --   --    WBC 15.53* 11.97  11.97* 11.77*  --  9.42  9.42 9.93  9.93  --   --  12.28  12.28*  --  9.75   HGB 10.3* 10.5* 8.2*  --  8.9* 8.7*  --   --  8.5*  --  8.6*   HCT 31.8* 32.4* 25.1*  --  26.9* 27.5*  --   --  25.7*  --  27.2*     Nutrition Orders:  Diet Full Liquid      Appetite/Oral Intake: NPO/NPO  Factors Affecting Nutritional Intake: clear liquid diet  Food/Pentec[employer]alism/Cultural Preferences: unable to obtain  Food Allergies: none reported  Last Bowel Movement: 24  Wound(s):  incision noted    Comments    3/19/24: Discussed with RN. Will provide tube feeding recommendations for when appropriate to start. No kcal from meds at this time. Noted partial bowel obstruction. Will monitor for need for PN.     3/21/24: Pt now extubated. Verified UBW. Plans for diet advancement. Full liquids just started. Pt willing to drink ONS. Will also send Ge for wound healing.     Anthropometrics    Height: 5' 11" (180.3 cm),    Last Weight: 90 kg (198 lb 6.6 oz) (24 1113), Weight Method: Estimated  BMI (Calculated): 27.7  BMI Classification: overweight (BMI 25-29.9)        Ideal Body Weight (IBW), Male: 172 lb                       Usual Body Weight (UBW), k.91 kg  % Usual Body Weight: 99.21  % Weight Change From Usual Weight: -1 %  Usual Weight Provided By: unable to obtain usual weight    Wt Readings from Last 5 Encounters:   24 90 kg (198 lb 6.6 oz)   24 92.1 kg (203 lb)   24 92.4 kg (203 lb 11.3 oz)   11/15/23 90.7 kg (200 lb)   23 91.2 kg (201 lb)     Weight Change(s) Since Admission:   Wt Readings from Last 1 Encounters: "   03/21/24 1113 90 kg (198 lb 6.6 oz)   03/18/24 2202 90 kg (198 lb 6.6 oz)   03/17/24 2036 77.1 kg (170 lb)   Admit Weight: 77.1 kg (170 lb) (03/17/24 2036), Weight Method: Estimated    Enteral Nutrition     Patient not receiving enteral nutrition at this time.    Parenteral Nutrition     Patient not receiving parenteral nutrition support at this time.    Evaluation of Received Nutrient Intake    Calories: not meeting estimated needs  Protein: not meeting estimated needs    Patient Education     Not applicable.    Nutrition Diagnosis     PES: Inadequate oral intake related to acute illness as evidenced by NPO since extubation. (active)     Nutrition Interventions     Intervention(s): general/healthful diet, commercial beverage, and collaboration with other providers    Goal: Meet greater than 80% of nutritional needs by follow-up. (goal progressing)  Goal: Tolerate enteral feeding at goal rate by follow-up. (goal discontinued)    Nutrition Goals & Monitoring     Dietitian will monitor: energy intake    Nutrition Risk/Follow-Up: low (follow-up in 5-7 days)   Please consult if re-assessment needed sooner.

## 2024-03-21 NOTE — PROGRESS NOTES
UROLOGY  PROGRESS  NOTE    Ran Reyes Jr. 1955  [id number]  3/21/2024    POD 3 s/p left hemiscrotal exploration, left orchiectomy, washout/debridement of wound    Patient resting in bed  Denies any pain at time of rounds    VSS, afebrile   1105 mL urine output overnight  WBC 9.75   H&H 8.6/27.2   BUN and creatinine 10.6/0.79    Intra-op Cultures with e coli, group B strep - on vanc and Zosyn  UC from admit with 10-25,000 colonies yeast    Intake/Output:  No intake/output data recorded.  I/O last 3 completed shifts:  In: 2710.3 [I.V.:679.2; IV Piggyback:2031.1]  Out: 2770 [Urine:2470; Drains:300]     Exam:    sleepy  Resp: nonlabored  Abd: soft, ND  : clear yellow urine draining to  bag; scrotal wound packed, medial and inferior edges with some necrosis, grossly unchanged   Extremity: no C/C/E    Recent Results (from the past 24 hour(s))   RT Blood Gas    Collection Time: 03/20/24  7:39 PM   Result Value Ref Range    Sample Type Arterial Blood     Sample site Right Radial Artery     Drawn by RCL CRT     pH, Blood gas 7.500 (H) 7.350 - 7.450    pCO2, Blood gas 45.0 35.0 - 45.0 mmHg    pO2, Blood gas 52.0 (LL) 80.0 - 100.0 mmHg    Sodium, Blood Gas 137 137 - 145 mmol/L    Potassium, Blood Gas 3.1 (L) 3.5 - 5.0 mmol/L    Calcium Level Ionized 1.08 (L) 1.12 - 1.23 mmol/L    TOC2, Blood gas 36.5 mmol/L    Base Excess, Blood gas 10.60 mmol/L    sO2, Blood gas 90.0 %    HCO3, Blood gas 35.1 (H) 22.0 - 26.0 mmol/L    Allens Test Yes     Oxygen Device, Blood gas Cannula     LPM 6     FIO2, Blood gas 44 %   Triglycerides    Collection Time: 03/21/24  2:19 AM   Result Value Ref Range    Triglyceride 104 34 - 140 mg/dL   Comprehensive metabolic panel    Collection Time: 03/21/24  2:19 AM   Result Value Ref Range    Sodium Level 140 136 - 145 mmol/L    Potassium Level 3.4 (L) 3.5 - 5.1 mmol/L    Chloride 101 98 - 107 mmol/L    Carbon Dioxide 29 23 - 31 mmol/L    Glucose Level 104 82 - 115 mg/dL    Blood Urea Nitrogen  10.6 8.4 - 25.7 mg/dL    Creatinine 0.79 0.73 - 1.18 mg/dL    Calcium Level Total 7.7 (L) 8.8 - 10.0 mg/dL    Protein Total 5.0 (L) 5.8 - 7.6 gm/dL    Albumin Level 2.1 (L) 3.4 - 4.8 g/dL    Globulin 2.9 2.4 - 3.5 gm/dL    Albumin/Globulin Ratio 0.7 (L) 1.1 - 2.0 ratio    Bilirubin Total 0.5 <=1.5 mg/dL    Alkaline Phosphatase 64 40 - 150 unit/L    Alanine Aminotransferase 11 0 - 55 unit/L    Aspartate Aminotransferase 21 5 - 34 unit/L    eGFR >60 mls/min/1.73/m2   Magnesium    Collection Time: 03/21/24  2:19 AM   Result Value Ref Range    Magnesium Level 2.20 1.60 - 2.60 mg/dL   Phosphorus    Collection Time: 03/21/24  2:19 AM   Result Value Ref Range    Phosphorus Level 1.7 (L) 2.3 - 4.7 mg/dL   CBC with Differential    Collection Time: 03/21/24  2:20 AM   Result Value Ref Range    WBC 9.75 4.50 - 11.50 x10(3)/mcL    RBC 2.80 (L) 4.70 - 6.10 x10(6)/mcL    Hgb 8.6 (L) 14.0 - 18.0 g/dL    Hct 27.2 (L) 42.0 - 52.0 %    MCV 97.1 (H) 80.0 - 94.0 fL    MCH 30.7 27.0 - 31.0 pg    MCHC 31.6 (L) 33.0 - 36.0 g/dL    RDW 16.0 11.5 - 17.0 %    Platelet 199 130 - 400 x10(3)/mcL    MPV 11.1 (H) 7.4 - 10.4 fL    Neut % 72.6 %    Lymph % 13.8 %    Mono % 8.6 %    Eos % 0.3 %    Basophil % 0.7 %    Lymph # 1.35 0.6 - 4.6 x10(3)/mcL    Neut # 7.07 2.1 - 9.2 x10(3)/mcL    Mono # 0.84 0.1 - 1.3 x10(3)/mcL    Eos # 0.03 0 - 0.9 x10(3)/mcL    Baso # 0.07 <=0.2 x10(3)/mcL    IG# 0.39 (H) 0 - 0.04 x10(3)/mcL    IG% 4.0 %    NRBC% 0.2 %       Assessment:  -scrotal abscess s/p left hemiscrotal exploration, left orchiectomy, washout/debridement of wound  -sepsis - on zosyn and vancomycin, BC negative thus far; wound culture with e coli and group B strep, sensitivities pending  -NSTEMI  -AFib RVR, chronic AFib  -COPD, nonischemic cardiomyopathy    Plan:  -Continue antibiotics, tailor according to final C&S results  -OK for blood thinners from Urology standpoint, preferably lovenox in case he requires additional debridements  -Recs per  cardiology/ICU  -Continue wound care with wet-to-dry dressings  -Discussed with nursing  -Following.     Qiana Valenzuela NP

## 2024-03-21 NOTE — PT/OT/SLP EVAL
Physical Therapy Evaluation    Patient Name:  Ran Reyes Jr.   MRN:  [id number]    Recommendations:     Discharge therapy intensity: Moderate Intensity Therapy   Discharge Equipment Recommendations: to be determined by next level of care   Barriers to discharge:  medical dx, impaired mobility, decreased independence     Assessment:     Ran Reyes Jr. is a 68 y.o. male admitted with a medical diagnosis of testicular swelling and bleeding; L scrotal abscess; sepsis; UTI; s/p orchiectomy, joselito scrotal exploration, debridement; post op vfib arrest; Human Metapneumovirus.    He presents with the following impairments/functional limitations: weakness, impaired endurance, impaired balance, decreased lower extremity function, decreased upper extremity function, gait instability, impaired self care skills, impaired functional mobility.  Patient tolerated PT eval fairly well. Pt req maxA for bed mobility, modA x2 for sit<>stand and able to take a few alteral steps along EOB with use of RW. Pt with B UE tremors at rest and with activity. Pt with generalized weakness and would benefit from on-going acute services as well as placement beyond this stay in order to maximize functional mobility and overall independence.    Rehab Prognosis: Good; patient would benefit from acute skilled PT services to address these deficits and reach maximum level of function.    Recent Surgery: Procedure(s) (LRB):  Incision and Drainage (N/A)  ORCHIECTOMY (Left) 3 Days Post-Op    Plan:     During this hospitalization, patient to be seen 5 x/week to address the identified rehab impairments via gait training, therapeutic exercises, therapeutic activities and progress toward the following goals:    Plan of Care Expires:  04/21/24    Subjective     Chief Complaint: n/a  Patient/Family Comments/goals: to get stronger   Pain/Comfort:  Pain Rating 1: 0/10    Patients cultural, spiritual, Anabaptist conflicts given the current situation:  no    Living Environment:  Pt lives alone in a H  Prior to admission, patients level of function was independent.    Equipment used at home: none.  DME owned (not currently used): none.    Upon discharge, patient will have assistance from unsure.    Objective:     Communicated with NSG prior to session.  Patient found HOB elevated with blood pressure cuff, pulse ox (continuous), telemetry, SCD, pressure relief boots, NG tube, wedge on pt's L side  upon PT entry to room.    General Precautions: Standard, fall  Orthopedic Precautions:N/A   Braces: N/A  Respiratory Status: Nasal cannula, flow 4.5 L/min  Blood Pressure: 94/61 prior to ax, 122/75 sitting EOB   SpO2: 92%  HR: 67      Exams:  Cognitive Exam:  Patient is oriented to Person, Place, and Time  BLE Strength: grossly 4/5  Skin integrity: Visible skin intact      Functional Mobility:  Bed Mobility:     Supine to Sit: maximal assistance  Sit to Supine: maximal assistance  Transfers:     Sit to Stand:  moderate assistance and of 2 persons with rolling walker  Pre-Gait: pt able to take x3 lateral steps on ea LE along EOB with use of RW and min-modA; increased assistance req for safety and RW management; slightly unsteady, remained with tremors   After taking a few steps pt with complaints of urge for BM. Sat pt down and returned to bed to be placed on bed pan       Treatment & Education:  Patient provided with verbal education regarding PT role/goals/POC, fall prevention, safety awareness, and discharge/DME recommendations.  Understanding was verbalized.     Patient left HOB elevated with all lines intact, call button in reach, and RN notified.  Patient left on bed pan due to urge for BM.     GOALS:   Multidisciplinary Problems       Physical Therapy Goals          Problem: Physical Therapy    Goal Priority Disciplines Outcome Goal Variances Interventions   Physical Therapy Goal     PT, PT/OT Ongoing, Progressing     Description: Goals to be met by: 4/21/24      Patient will increase functional independence with mobility by performin. Supine to sit with MInimal Assistance  2. Sit to supine with MInimal Assistance  3. Sit to stand transfer with Stand-by Assistance  4. Gait  x 150 feet with Stand-by Assistance using Rolling Walker.                          History:     Past Medical History:   Diagnosis Date    A-fib     Anticoagulant long-term use     Aortic aneurysm     Cataract     CHF (congestive heart failure)     Chronic atrial fibrillation     COPD (chronic obstructive pulmonary disease)     Coronary artery disease     HLD (hyperlipidemia)     Hypertension     Mitral regurgitation     PAD (peripheral artery disease)     Primary open angle glaucoma (POAG)        Past Surgical History:   Procedure Laterality Date    ATHERECTOMY OF PERIPHERAL VESSEL Left 2022    LEFT SFA ATHERECTOMY, BALLOON ANGIOPLASTY    CATARACT EXTRACTION W/  INTRAOCULAR LENS IMPLANT Left 3/9/2023    Procedure: EXTRACTION, CATARACT, WITH IOL INSERTION;  Surgeon: Georgi Borja MD;  Location: [facility];  Service: Ophthalmology;  Laterality: Left;  19.5  mac    Heart Stent N/A     > 10yrs. AGO    INCISION AND DRAINAGE N/A 3/18/2024    Procedure: Incision and Drainage;  Surgeon: Fahad Rivas MD;  Location: [facility];  Service: Urology;  Laterality: N/A;  I&D SCROTAL ABSCESS    INSERTION OF STENT INTO PERIPHERAL VESSEL Right 10/17/2022    RIGHT SFA ATHERECTOMY, BALLOON ANGIOPLASTY, STENT; RIGHT ANTERIOR TIBIAL ARTERY ATHERECTOMY, BALLOON ANGIOPLASTY    ORCHIECTOMY Left 3/18/2024    Procedure: ORCHIECTOMY;  Surgeon: Fahad Rivas MD;  Location: [facility];  Service: Urology;  Laterality: Left;       Time Tracking:     PT Received On: 24  PT Start Time: 928     PT Stop Time: 949  PT Total Time (min): 21 min     Billable Minutes: Evaluation mod      2024

## 2024-03-22 ENCOUNTER — ANESTHESIA (OUTPATIENT)
Dept: ENDOSCOPY | Facility: HOSPITAL | Age: 69
DRG: 853 | End: 2024-03-22
Payer: MEDICARE

## 2024-03-22 ENCOUNTER — ANESTHESIA EVENT (OUTPATIENT)
Dept: ENDOSCOPY | Facility: HOSPITAL | Age: 69
DRG: 853 | End: 2024-03-22
Payer: MEDICARE

## 2024-03-22 LAB
ALBUMIN SERPL-MCNC: 2 G/DL (ref 3.4–4.8)
ALBUMIN/GLOB SERPL: 0.6 RATIO (ref 1.1–2)
ALP SERPL-CCNC: 59 UNIT/L (ref 40–150)
ALT SERPL-CCNC: 11 UNIT/L (ref 0–55)
AST SERPL-CCNC: 20 UNIT/L (ref 5–34)
BACTERIA BLD CULT: NORMAL
BACTERIA BLD CULT: NORMAL
BACTERIA SPEC CULT: ABNORMAL
BASOPHILS # BLD AUTO: 0.04 X10(3)/MCL
BASOPHILS NFR BLD AUTO: 0.5 %
BILIRUB SERPL-MCNC: 0.4 MG/DL
BUN SERPL-MCNC: 7.2 MG/DL (ref 8.4–25.7)
CALCIUM SERPL-MCNC: 7.9 MG/DL (ref 8.8–10)
CHLORIDE SERPL-SCNC: 106 MMOL/L (ref 98–107)
CO2 SERPL-SCNC: 26 MMOL/L (ref 23–31)
CREAT SERPL-MCNC: 0.83 MG/DL (ref 0.73–1.18)
EOSINOPHIL # BLD AUTO: 0.04 X10(3)/MCL (ref 0–0.9)
EOSINOPHIL NFR BLD AUTO: 0.5 %
ERYTHROCYTE [DISTWIDTH] IN BLOOD BY AUTOMATED COUNT: 16 % (ref 11.5–17)
GFR SERPLBLD CREATININE-BSD FMLA CKD-EPI: >60 MLS/MIN/1.73/M2
GLOBULIN SER-MCNC: 3.5 GM/DL (ref 2.4–3.5)
GLUCOSE SERPL-MCNC: 108 MG/DL (ref 82–115)
GRAM STN SPEC: ABNORMAL
GRAM STN SPEC: ABNORMAL
HCT VFR BLD AUTO: 26.3 % (ref 42–52)
HGB BLD-MCNC: 8.4 G/DL (ref 14–18)
IMM GRANULOCYTES # BLD AUTO: 0.37 X10(3)/MCL (ref 0–0.04)
IMM GRANULOCYTES NFR BLD AUTO: 4.2 %
LYMPHOCYTES # BLD AUTO: 1.24 X10(3)/MCL (ref 0.6–4.6)
LYMPHOCYTES NFR BLD AUTO: 14 %
MAGNESIUM SERPL-MCNC: 2.1 MG/DL (ref 1.6–2.6)
MCH RBC QN AUTO: 30.1 PG (ref 27–31)
MCHC RBC AUTO-ENTMCNC: 31.9 G/DL (ref 33–36)
MCV RBC AUTO: 94.3 FL (ref 80–94)
MONOCYTES # BLD AUTO: 0.91 X10(3)/MCL (ref 0.1–1.3)
MONOCYTES NFR BLD AUTO: 10.3 %
NEUTROPHILS # BLD AUTO: 6.23 X10(3)/MCL (ref 2.1–9.2)
NEUTROPHILS NFR BLD AUTO: 70.5 %
NRBC BLD AUTO-RTO: 0.5 %
PHOSPHATE SERPL-MCNC: 3.2 MG/DL (ref 2.3–4.7)
PLATELET # BLD AUTO: 241 X10(3)/MCL (ref 130–400)
PMV BLD AUTO: 10.8 FL (ref 7.4–10.4)
POCT GLUCOSE: 117 MG/DL (ref 70–110)
POTASSIUM SERPL-SCNC: 3.2 MMOL/L (ref 3.5–5.1)
PROT SERPL-MCNC: 5.5 GM/DL (ref 5.8–7.6)
RBC # BLD AUTO: 2.79 X10(6)/MCL (ref 4.7–6.1)
SODIUM SERPL-SCNC: 143 MMOL/L (ref 136–145)
TRIGL SERPL-MCNC: 69 MG/DL (ref 34–140)
VANCOMYCIN SERPL-MCNC: 19 UG/ML (ref 15–20)
WBC # SPEC AUTO: 8.83 X10(3)/MCL (ref 4.5–11.5)

## 2024-03-22 PROCEDURE — 0DB78ZX EXCISION OF STOMACH, PYLORUS, VIA NATURAL OR ARTIFICIAL OPENING ENDOSCOPIC, DIAGNOSTIC: ICD-10-PCS | Performed by: INTERNAL MEDICINE

## 2024-03-22 PROCEDURE — 27201423 OPTIME MED/SURG SUP & DEVICES STERILE SUPPLY: Performed by: INTERNAL MEDICINE

## 2024-03-22 PROCEDURE — 88313 SPECIAL STAINS GROUP 2: CPT

## 2024-03-22 PROCEDURE — 80053 COMPREHEN METABOLIC PANEL: CPT

## 2024-03-22 PROCEDURE — 97530 THERAPEUTIC ACTIVITIES: CPT | Mod: CQ

## 2024-03-22 PROCEDURE — 99900035 HC TECH TIME PER 15 MIN (STAT)

## 2024-03-22 PROCEDURE — 27000221 HC OXYGEN, UP TO 24 HOURS

## 2024-03-22 PROCEDURE — C9113 INJ PANTOPRAZOLE SODIUM, VIA: HCPCS | Performed by: INTERNAL MEDICINE

## 2024-03-22 PROCEDURE — 25000242 PHARM REV CODE 250 ALT 637 W/ HCPCS: Performed by: STUDENT IN AN ORGANIZED HEALTH CARE EDUCATION/TRAINING PROGRAM

## 2024-03-22 PROCEDURE — 63600175 PHARM REV CODE 636 W HCPCS: Performed by: INTERNAL MEDICINE

## 2024-03-22 PROCEDURE — 88312 SPECIAL STAINS GROUP 1: CPT

## 2024-03-22 PROCEDURE — 11000001 HC ACUTE MED/SURG PRIVATE ROOM

## 2024-03-22 PROCEDURE — 25000003 PHARM REV CODE 250

## 2024-03-22 PROCEDURE — 94640 AIRWAY INHALATION TREATMENT: CPT

## 2024-03-22 PROCEDURE — 99900031 HC PATIENT EDUCATION (STAT)

## 2024-03-22 PROCEDURE — 27000207 HC ISOLATION

## 2024-03-22 PROCEDURE — 94760 N-INVAS EAR/PLS OXIMETRY 1: CPT

## 2024-03-22 PROCEDURE — 37000008 HC ANESTHESIA 1ST 15 MINUTES: Performed by: INTERNAL MEDICINE

## 2024-03-22 PROCEDURE — 88305 TISSUE EXAM BY PATHOLOGIST: CPT | Performed by: INTERNAL MEDICINE

## 2024-03-22 PROCEDURE — 84478 ASSAY OF TRIGLYCERIDES: CPT

## 2024-03-22 PROCEDURE — 84100 ASSAY OF PHOSPHORUS: CPT

## 2024-03-22 PROCEDURE — D9220A PRA ANESTHESIA: Mod: CRNA,,,

## 2024-03-22 PROCEDURE — 25000003 PHARM REV CODE 250: Performed by: INTERNAL MEDICINE

## 2024-03-22 PROCEDURE — 25000003 PHARM REV CODE 250: Performed by: STUDENT IN AN ORGANIZED HEALTH CARE EDUCATION/TRAINING PROGRAM

## 2024-03-22 PROCEDURE — 63600175 PHARM REV CODE 636 W HCPCS: Mod: JZ,JG

## 2024-03-22 PROCEDURE — 25000003 PHARM REV CODE 250: Performed by: ANESTHESIOLOGY

## 2024-03-22 PROCEDURE — 80202 ASSAY OF VANCOMYCIN: CPT | Performed by: INTERNAL MEDICINE

## 2024-03-22 PROCEDURE — 85025 COMPLETE CBC W/AUTO DIFF WBC: CPT

## 2024-03-22 PROCEDURE — 63600175 PHARM REV CODE 636 W HCPCS: Performed by: STUDENT IN AN ORGANIZED HEALTH CARE EDUCATION/TRAINING PROGRAM

## 2024-03-22 PROCEDURE — 43239 EGD BIOPSY SINGLE/MULTIPLE: CPT | Performed by: INTERNAL MEDICINE

## 2024-03-22 PROCEDURE — 63600175 PHARM REV CODE 636 W HCPCS

## 2024-03-22 PROCEDURE — D9220A PRA ANESTHESIA: Mod: ANES,,, | Performed by: ANESTHESIOLOGY

## 2024-03-22 PROCEDURE — 83735 ASSAY OF MAGNESIUM: CPT

## 2024-03-22 RX ORDER — PROPOFOL 10 MG/ML
VIAL (ML) INTRAVENOUS
Status: DISCONTINUED | OUTPATIENT
Start: 2024-03-22 | End: 2024-03-22

## 2024-03-22 RX ORDER — EPHEDRINE SULFATE 50 MG/ML
INJECTION, SOLUTION INTRAVENOUS
Status: COMPLETED
Start: 2024-03-22 | End: 2024-03-22

## 2024-03-22 RX ORDER — SUCRALFATE 1 G/1
1 TABLET ORAL
Status: DISPENSED | OUTPATIENT
Start: 2024-03-22 | End: 2024-03-29

## 2024-03-22 RX ORDER — METOCLOPRAMIDE HYDROCHLORIDE 5 MG/ML
10 INJECTION INTRAMUSCULAR; INTRAVENOUS EVERY 10 MIN PRN
Status: CANCELLED | OUTPATIENT
Start: 2024-03-22

## 2024-03-22 RX ORDER — EPHEDRINE SULFATE 50 MG/ML
INJECTION, SOLUTION INTRAVENOUS
Status: DISCONTINUED | OUTPATIENT
Start: 2024-03-22 | End: 2024-03-22

## 2024-03-22 RX ORDER — LIDOCAINE HYDROCHLORIDE 20 MG/ML
INJECTION INTRAVENOUS
Status: DISCONTINUED | OUTPATIENT
Start: 2024-03-22 | End: 2024-03-22

## 2024-03-22 RX ORDER — ENOXAPARIN SODIUM 100 MG/ML
1 INJECTION SUBCUTANEOUS EVERY 12 HOURS
Status: DISCONTINUED | OUTPATIENT
Start: 2024-03-22 | End: 2024-03-24

## 2024-03-22 RX ORDER — HYDROMORPHONE HYDROCHLORIDE 2 MG/ML
1 INJECTION, SOLUTION INTRAMUSCULAR; INTRAVENOUS; SUBCUTANEOUS ONCE
Status: COMPLETED | OUTPATIENT
Start: 2024-03-22 | End: 2024-03-22

## 2024-03-22 RX ORDER — LIDOCAINE HYDROCHLORIDE 10 MG/ML
1 INJECTION, SOLUTION EPIDURAL; INFILTRATION; INTRACAUDAL; PERINEURAL ONCE
Status: DISCONTINUED | OUTPATIENT
Start: 2024-03-22 | End: 2024-03-26 | Stop reason: HOSPADM

## 2024-03-22 RX ORDER — ENOXAPARIN SODIUM 100 MG/ML
40 INJECTION SUBCUTANEOUS EVERY 24 HOURS
Status: DISCONTINUED | OUTPATIENT
Start: 2024-03-22 | End: 2024-03-22

## 2024-03-22 RX ORDER — MUPIROCIN 20 MG/G
OINTMENT TOPICAL 2 TIMES DAILY
Status: DISCONTINUED | OUTPATIENT
Start: 2024-03-24 | End: 2024-03-22

## 2024-03-22 RX ORDER — PHENYLEPHRINE HYDROCHLORIDE 10 MG/ML
INJECTION INTRAVENOUS
Status: DISPENSED
Start: 2024-03-22 | End: 2024-03-23

## 2024-03-22 RX ORDER — POTASSIUM CHLORIDE 14.9 MG/ML
20 INJECTION INTRAVENOUS
Status: COMPLETED | OUTPATIENT
Start: 2024-03-22 | End: 2024-03-22

## 2024-03-22 RX ORDER — ONDANSETRON HYDROCHLORIDE 2 MG/ML
4 INJECTION, SOLUTION INTRAVENOUS ONCE
Status: CANCELLED | OUTPATIENT
Start: 2024-03-22 | End: 2024-03-22

## 2024-03-22 RX ORDER — MUPIROCIN 20 MG/G
OINTMENT TOPICAL 2 TIMES DAILY
Status: COMPLETED | OUTPATIENT
Start: 2024-03-22 | End: 2024-03-26

## 2024-03-22 RX ORDER — VANCOMYCIN HCL IN 5 % DEXTROSE 1G/250ML
1000 PLASTIC BAG, INJECTION (ML) INTRAVENOUS
Status: COMPLETED | OUTPATIENT
Start: 2024-03-22 | End: 2024-03-22

## 2024-03-22 RX ORDER — SODIUM CHLORIDE 9 MG/ML
INJECTION, SOLUTION INTRAVENOUS CONTINUOUS
Status: ACTIVE | OUTPATIENT
Start: 2024-03-22 | End: 2024-03-24

## 2024-03-22 RX ORDER — PROPOFOL 10 MG/ML
VIAL (ML) INTRAVENOUS
Status: COMPLETED
Start: 2024-03-22 | End: 2024-03-22

## 2024-03-22 RX ADMIN — THIAMINE HYDROCHLORIDE 500 MG: 100 INJECTION, SOLUTION INTRAMUSCULAR; INTRAVENOUS at 08:03

## 2024-03-22 RX ADMIN — PANTOPRAZOLE SODIUM 40 MG: 40 INJECTION, POWDER, FOR SOLUTION INTRAVENOUS at 08:03

## 2024-03-22 RX ADMIN — PROPOFOL 40 MG: 10 INJECTION, EMULSION INTRAVENOUS at 02:03

## 2024-03-22 RX ADMIN — MIDODRINE HYDROCHLORIDE 10 MG: 5 TABLET ORAL at 11:03

## 2024-03-22 RX ADMIN — POTASSIUM CHLORIDE 20 MEQ: 14.9 INJECTION, SOLUTION INTRAVENOUS at 03:03

## 2024-03-22 RX ADMIN — MORPHINE SULFATE 2 MG: 4 INJECTION, SOLUTION INTRAMUSCULAR; INTRAVENOUS at 04:03

## 2024-03-22 RX ADMIN — SUCRALFATE 1 G: 1 TABLET ORAL at 04:03

## 2024-03-22 RX ADMIN — LEVALBUTEROL HYDROCHLORIDE 1.25 MG: 1.25 SOLUTION RESPIRATORY (INHALATION) at 01:03

## 2024-03-22 RX ADMIN — CHLORHEXIDINE GLUCONATE 0.12% ORAL RINSE 15 ML: 1.2 LIQUID ORAL at 08:03

## 2024-03-22 RX ADMIN — PIPERACILLIN AND TAZOBACTAM 4.5 G: 4; .5 INJECTION, POWDER, LYOPHILIZED, FOR SOLUTION INTRAVENOUS; PARENTERAL at 04:03

## 2024-03-22 RX ADMIN — THERA TABS 1 TABLET: TAB at 08:03

## 2024-03-22 RX ADMIN — LIDOCAINE HYDROCHLORIDE 100 MG: 20 INJECTION INTRAVENOUS at 02:03

## 2024-03-22 RX ADMIN — MORPHINE SULFATE 2 MG: 4 INJECTION, SOLUTION INTRAMUSCULAR; INTRAVENOUS at 02:03

## 2024-03-22 RX ADMIN — HYDROMORPHONE HYDROCHLORIDE 1 MG: 2 INJECTION INTRAMUSCULAR; INTRAVENOUS; SUBCUTANEOUS at 06:03

## 2024-03-22 RX ADMIN — SUCRALFATE 1 G: 1 TABLET ORAL at 08:03

## 2024-03-22 RX ADMIN — ENOXAPARIN SODIUM 90 MG: 100 INJECTION SUBCUTANEOUS at 08:03

## 2024-03-22 RX ADMIN — SODIUM CHLORIDE: 9 INJECTION, SOLUTION INTRAVENOUS at 01:03

## 2024-03-22 RX ADMIN — PIPERACILLIN AND TAZOBACTAM 4.5 G: 4; .5 INJECTION, POWDER, LYOPHILIZED, FOR SOLUTION INTRAVENOUS; PARENTERAL at 06:03

## 2024-03-22 RX ADMIN — EPHEDRINE SULFATE 10 MG: 50 INJECTION INTRAVENOUS at 02:03

## 2024-03-22 RX ADMIN — POTASSIUM CHLORIDE 20 MEQ: 14.9 INJECTION, SOLUTION INTRAVENOUS at 06:03

## 2024-03-22 RX ADMIN — MUPIROCIN: 20 OINTMENT TOPICAL at 09:03

## 2024-03-22 RX ADMIN — SODIUM CHLORIDE, SODIUM GLUCONATE, SODIUM ACETATE, POTASSIUM CHLORIDE AND MAGNESIUM CHLORIDE: 526; 502; 368; 37; 30 INJECTION, SOLUTION INTRAVENOUS at 02:03

## 2024-03-22 RX ADMIN — FOLIC ACID 1 MG: 1 TABLET ORAL at 08:03

## 2024-03-22 RX ADMIN — ATORVASTATIN CALCIUM 80 MG: 40 TABLET, FILM COATED ORAL at 08:03

## 2024-03-22 RX ADMIN — THIAMINE HYDROCHLORIDE 500 MG: 100 INJECTION, SOLUTION INTRAMUSCULAR; INTRAVENOUS at 04:03

## 2024-03-22 RX ADMIN — PROPOFOL 20 MG: 10 INJECTION, EMULSION INTRAVENOUS at 02:03

## 2024-03-22 RX ADMIN — MUPIROCIN: 20 OINTMENT TOPICAL at 08:03

## 2024-03-22 RX ADMIN — MIDODRINE HYDROCHLORIDE 10 MG: 5 TABLET ORAL at 08:03

## 2024-03-22 RX ADMIN — VANCOMYCIN HYDROCHLORIDE 1000 MG: 1 INJECTION, POWDER, LYOPHILIZED, FOR SOLUTION INTRAVENOUS at 10:03

## 2024-03-22 RX ADMIN — OXYCODONE HYDROCHLORIDE 5 MG: 5 TABLET ORAL at 11:03

## 2024-03-22 RX ADMIN — LEVALBUTEROL HYDROCHLORIDE 1.25 MG: 1.25 SOLUTION RESPIRATORY (INHALATION) at 07:03

## 2024-03-22 RX ADMIN — VANCOMYCIN HYDROCHLORIDE 1000 MG: 1 INJECTION, POWDER, LYOPHILIZED, FOR SOLUTION INTRAVENOUS at 11:03

## 2024-03-22 RX ADMIN — MIDODRINE HYDROCHLORIDE 10 MG: 5 TABLET ORAL at 04:03

## 2024-03-22 RX ADMIN — LEVALBUTEROL HYDROCHLORIDE 1.25 MG: 1.25 SOLUTION RESPIRATORY (INHALATION) at 08:03

## 2024-03-22 NOTE — PROVATION PATIENT INSTRUCTIONS
Discharge Summary/Instructions after an Endoscopic Procedure  Patient Name: Ran Reyes  Patient MRN: [id number]  Patient YOB: 1955 Friday, March 22, 2024  Ronald Calderon MD  Dear patient,  As a result of recent federal legislation (The Federal Cures Act), you may   receive lab or pathology results from your procedure in your [facility]   account before your physician is able to contact you. Your physician or   their representative will relay the results to you with their   recommendations at their soonest availability.  Thank you,  RESTRICTIONS:  During your procedure today, you received medications for sedation.  These   medications may affect your judgment, balance and coordination.  Therefore,   for 24 hours, you have the following restrictions:   - DO NOT drive a car, operate machinery, make legal/financial decisions,   sign important papers or drink alcohol.    ACTIVITY:  Today: no heavy lifting, straining or running due to procedural   sedation/anesthesia.  The following day: return to full activity including work.  DIET:  Eat and drink normally unless instructed otherwise.     TREATMENT FOR COMMON SIDE EFFECTS:  - Mild abdominal pain, nausea, belching, bloating or excessive gas:  rest,   eat lightly and use a heating pad.  - Sore Throat: treat with throat lozenges and/or gargle with warm salt   water.  - Because air was used during the procedure, expelling large amounts of air   from your rectum or belching is normal.  - If a bowel prep was taken, you may not have a bowel movement for 1-3 days.    This is normal.  SYMPTOMS TO WATCH FOR AND REPORT TO YOUR PHYSICIAN:  1. Abdominal pain or bloating, other than gas cramps.  2. Chest pain.  3. Back pain.  4. Signs of infection such as: chills or fever occurring within 24 hours   after the procedure.  5. Rectal bleeding, which would show as bright red, maroon, or black stools.   (A tablespoon of blood from the rectum is not serious,  especially if   hemorrhoids are present.)  6. Vomiting.  7. Weakness or dizziness.  GO DIRECTLY TO THE NEAREST EMERGENCY ROOM IF YOU HAVE ANY OF THE FOLLOWING:      Difficulty breathing              Chills and/or fever over 101 F   Persistent vomiting and/or vomiting blood   Severe abdominal pain   Severe chest pain   Black, tarry stools   Bleeding- more than one tablespoon   Any other symptom or condition that you feel may need urgent attention  Your doctor recommends these additional instructions:  If any biopsies were taken, your doctors clinic will contact you in 1 to 2   weeks with any results.  - [facility] patient to [facility] for [facility] care.   - Resume regular diet.   - protonix 40mg iv bid while admitted then on dc start po for 1month and   then daily thereafter for 3 months  carafate liquid suspension 1 gram po qid for 7 days  dicyclomine 10mg or hyoscyamine 0.125mg po q 6hrs prn abdominal pain  ok to start antithrombotics  green stool in pt w scrotal abscess, will hold on colonoscopy for now. can   schedule this as an outpt once scrotal infection clears, perhaps 3months  will sign off now  For questions, problems or results please call your physician - Ronald Calderon MD at Work:  [phone].  [facility], EMERGENCY ROOM PHONE NUMBER: [phone]  IF A COMPLICATION OR EMERGENCY SITUATION ARISES AND YOU ARE UNABLE TO REACH   YOUR PHYSICIAN - GO DIRECTLY TO THE EMERGENCY ROOM.  MD Ronald Blas MD  3/22/2024 2:26:41 PM  This report has been verified and signed electronically.  Dear patient,  As a result of recent federal legislation (The Federal Cures Act), you may   receive lab or pathology results from your procedure in your [facility]   account before your physician is able to contact you. Your physician or   their representative will relay the results to you with their   recommendations at their soonest availability.  Thank you,  PROVATION

## 2024-03-22 NOTE — PT/OT/SLP PROGRESS
Physical Therapy Treatment    Patient Name:  Ran Reyes Jr.   MRN:  [id number]    Recommendations:     Discharge therapy intensity: Moderate Intensity Therapy   Discharge Equipment Recommendations: to be determined by next level of care  Barriers to discharge: Impaired mobility and Ongoing medical needs    Assessment:     Ran Reyes Jr. is a 68 y.o. male admitted with a medical diagnosis of  testicular swelling and bleeding; L scrotal abscess; sepsis; UTI; s/p orchiectomy, joselito scrotal exploration, debridement; post op vfib arrest; Human Metapneumovirus.  He presents with the following impairments/functional limitations: weakness, gait instability, impaired endurance, impaired balance, impaired cardiopulmonary response to activity, impaired self care skills, impaired functional mobility, decreased safety awareness.    Rehab Prognosis: Good; patient would benefit from acute skilled PT services to address these deficits and reach maximum level of function.    Recent Surgery: Procedure(s) (LRB):  EGD (N/A)  EGD, WITH CLOSED BIOPSY Day of Surgery    Plan:     During this hospitalization, patient to be seen 5 x/week to address the identified rehab impairments via gait training, therapeutic activities, therapeutic exercises and progress toward the following goals:    Plan of Care Expires:  04/21/24    Subjective     Chief Complaint: no major complaints  Patient/Family Comments/goals: to get better  Pain/Comfort:  Pain Rating 1: 0/10      Objective:     Communicated with RN prior to session.  Patient found HOB elevated with blood pressure cuff, pulse ox (continuous), telemetry, booth catheter, pressure relief boots, Other (comments) (scrotal bandage) upon PT entry to room.     General Precautions: Standard, fall  Orthopedic Precautions: N/A  Braces: N/A  Respiratory Status:  NC  Blood Pressure: NT  Skin Integrity:  scrotal wound covered with bandage      Functional Mobility:  Bed Mobility:    Supine to sit with  mod assist  Sit to supine with min assist  Transfers:    Sit<->stand with mod assist x 2 trials; BM on each trial  Gait: Side steps to HOB x 2' with RW and min assist  Tolerance to activity:   Stood ~ 1 minute for beverley-care and to reposition to [facility] with increased trunk flexion noted as he fatigued     Therapeutic Activities/Exercises:  Tx limited by arrival of transport. Pt leaving for EGD. Assisted with lateral transfer to stretcher.     Education:  Patient provided with verbal education education regarding PT role/goals/POC.  Understanding was verbalized.     Patient left [facility] elevated with  staff present    GOALS:   Multidisciplinary Problems       Physical Therapy Goals          Problem: Physical Therapy    Goal Priority Disciplines Outcome Goal Variances Interventions   Physical Therapy Goal     PT, PT/OT Ongoing, Progressing     Description: Goals to be met by: 24     Patient will increase functional independence with mobility by performin. Supine to sit with MInimal Assistance  2. Sit to supine with MInimal Assistance  3. Sit to stand transfer with Stand-by Assistance  4. Gait  x 150 feet with Stand-by Assistance using Rolling Walker.                          Time Tracking:     PT Received On: 24  PT Start Time: 1304     PT Stop Time: 1328  PT Total Time (min): 24 min     Billable Minutes: Therapeutic Activity 2 units    Treatment Type: Treatment  PT/PTA: PTA     Number of PTA visits since last PT visit: 2024

## 2024-03-22 NOTE — TRANSFER OF CARE
"Anesthesia Transfer of Care Note    Patient: Ran Reyes Jr.    Procedure(s) Performed: Procedure(s) (LRB):  EGD (N/A)  EGD, WITH CLOSED BIOPSY    Patient location: GI    Anesthesia Type: general    Transport from OR: Transported from OR on room air with adequate spontaneous ventilation    Post pain: adequate analgesia    Post assessment: no apparent anesthetic complications    Post vital signs: stable    Level of consciousness: responds to stimulation    Nausea/Vomiting: no nausea/vomiting    Complications: none    Transfer of care protocol was followed      Last vitals: Visit Vitals  /64 (BP Location: Right arm, Patient Position: Lying)   Pulse (!) 51   Temp 36.4 °C (97.5 °F) (Skin)   Resp (!) 21   Ht 5' 11" (1.803 m)   Wt 89.8 kg (198 lb)   SpO2 (!) 94%   BMI 27.62 kg/m²     "

## 2024-03-22 NOTE — PROGRESS NOTES
Pharmacokinetic Assessment Follow Up: IV Vancomycin    Vancomycin serum concentration assessment(s):    The random level was drawn correctly and can be used to guide therapy at this time. The measurement is within the desired definitive target range of 15 to 20 mcg/mL.    Vancomycin Regimen Plan:  Last day of Vancomycin per Dr. Obrien  Vanc 1000 mg q12h for 2 doses to complete course  No level ordered    Scheduled Administration Times        Drug levels (last 3 results):  Recent Labs   Lab Result Units 03/19/24  1029 03/21/24  0951 03/22/24  0907   Vanc Lvl Random ug/ml  --   --  19.0   Vancomycin Trough ug/ml 10.0* 24.3*  --        Vancomycin Administrations:  vancomycin given in the last 96 hours                     vancomycin 1.25 g in dextrose 5% 250 mL IVPB (ready to mix) (mg) 1,250 mg New Bag 03/21/24 2222    vancomycin 1.25 g in dextrose 5% 250 mL IVPB (ready to mix) (mg) 1,250 mg New Bag 03/20/24 2311     1,250 mg New Bag  1137     1,250 mg New Bag 03/19/24 2312    vancomycin in dextrose 5 % 1 gram/250 mL IVPB 1,000 mg (mg) 1,000 mg New Bag 03/19/24 1051     1,000 mg New Bag 03/18/24 2319     1,000 mg New Bag  1126                    Pharmacy will continue to follow and monitor vancomycin.    Please contact pharmacy at extension [phone] for questions regarding this assessment.    Thank you for the consult,   Madhu Lynne       Patient brief summary:  Ran Reyes Jr. is a 68 y.o. male initiated on antimicrobial therapy with IV Vancomycin for treatment of sepsis, UTI    The patient's current regimen is 1000 mg q12h for 2 more doses to complete course     Drug Allergies:   Review of patient's allergies indicates:  No Known Allergies    Actual Body Weight:  Wt Readings from Last 1 Encounters:   03/21/24 90 kg (198 lb 6.6 oz)       Renal Function:   Estimated Creatinine Clearance: 93.2 mL/min (based on SCr of 0.83 mg/dL).,     Dialysis Method (if applicable):  N/A    CBC (last 72 hours):  Recent Labs   Lab  Result Units 03/20/24  0206 03/21/24  0220 03/22/24  0120   WBC x10(3)/mcL 12.28  12.28* 9.75 8.83   Hgb g/dL 8.5* 8.6* 8.4*   Hct % 25.7* 27.2* 26.3*   Platelet x10(3)/mcL 154 199 241   Mono % %  --  8.6 10.3   Monocytes % % 8  --   --    Eos % %  --  0.3 0.5   Basophil % %  --  0.7 0.5       Metabolic Panel (last 72 hours):  Recent Labs   Lab Result Units 03/19/24  1742 03/20/24  0206 03/21/24  0219 03/22/24  0120   Sodium Level mmol/L 137 136 140 143   Potassium Level mmol/L 3.4* 3.7 3.4* 3.2*   Chloride mmol/L 101 101 101 106   Carbon Dioxide mmol/L 24 25 29 26   Glucose Level mg/dL 157* 127* 104 108   Blood Urea Nitrogen mg/dL 16.2 15.2 10.6 7.2*   Creatinine mg/dL 0.81 0.84 0.79 0.83   Albumin Level g/dL  --  2.0* 2.1* 2.0*   Bilirubin Total mg/dL  --  0.4 0.5 0.4   Alkaline Phosphatase unit/L  --  55 64 59   Aspartate Aminotransferase unit/L  --  17 21 20   Alanine Aminotransferase unit/L  --  12 11 11   Magnesium Level mg/dL 2.30 2.30 2.20 2.10   Phosphorus Level mg/dL 2.2* 2.8 1.7* 3.2       Microbiologic Results:  Microbiology Results (last 7 days)       Procedure Component Value Units Date/Time    Respiratory Culture [[id number]]  (Abnormal) Collected: 03/20/24 0940    Order Status: Completed Specimen: Sputum, Expectorated Updated: 03/22/24 0912     Respiratory Culture Moderate Yeast     Comment: with no normal respiratory jt        GRAM STAIN Quality 2+      No bacteria seen    Blood Culture [[id number]]  (Normal) Collected: 03/18/24 2226    Order Status: Completed Specimen: Blood, Venous Updated: 03/21/24 2300     CULTURE, BLOOD ([facility]) No Growth At 72 Hours    Blood culture #2 **CANNOT BE ORDERED STAT** [[id number]]  (Normal) Collected: 03/17/24 2103    Order Status: Completed Specimen: Blood Updated: 03/21/24 2200     CULTURE, BLOOD ([facility]) No Growth At 96 Hours    Blood culture #1 **CANNOT BE ORDERED STAT** [[id number]]  (Normal) Collected: 03/17/24 2103    Order Status: Completed Specimen: Blood  Updated: 03/21/24 2200     CULTURE, BLOOD ([facility]) No Growth At 96 Hours    Wound Culture [[id number]]  (Abnormal)  (Susceptibility) Collected: 03/18/24 1502    Order Status: Completed Specimen: Abscess from Scrotum Updated: 03/21/24 1306     Wound Culture Many Escherichia coli      Many Streptococcus agalactiae (Group B)    Anaerobic Culture [[id number]] Collected: 03/18/24 1502    Order Status: Completed Specimen: Abscess from Scrotum Updated: 03/21/24 0941     Anaerobe Culture No Anaerobes Isolated    Urine culture [[id number]]  (Abnormal) Collected: 03/18/24 0034    Order Status: Completed Specimen: Urine Updated: 03/19/24 1004     Urine Culture No other significant growth      10,000 - 25,000 colonies/ml Yeast species     Comment: We have not further evaluated these organisms because three or more species compatible with normal genital jt usually represents specimen contamination from the time of collection, rather than infection. If clinical circumstances warrant further   workup of these organisms, please contact the Microbiology dept at [phone]; otherwise please have the patient submit another specimen.       Chlamydia/GC, PCR [[id number]] Collected: 03/19/24 0128    Order Status: Completed Specimen: Urine Updated: 03/19/24 0527     Chlamydia trachomatis PCR Not Detected     N. gonorrhea PCR Not Detected     Source Urine    Narrative:      The Xpert CT/NG test, performed on the GeneXpert system is a qualitative in vitro real-time polymerase chain reaction (PCR) test for the automated detected and differentiation for genomic DNA from Chlamydia trachomatis (CT) and/or Neisseria gonorrhoeae (NG).    STD Urine (CT/GC/Trich) [[id number]] Collected: 03/19/24 0128    Order Status: Canceled Specimen: Urine Updated: 03/19/24 0138

## 2024-03-22 NOTE — ANESTHESIA PREPROCEDURE EVALUATION
"                                                                                                             03/22/2024  Ran Reyes Jr. is a 68 y.o., male who presents for EGD. Has occult + stools.   PMH of non-ischemic cardiomyopathy, systolic heart failure with an EF of 41%, VHD, chronic AFib on Xarelto, peripheral arterial disease, COPD, HTN, and a large left testicle hydrocele who presented to the ED 3/18/24 for scrotal bleeding with testicular swelling and pain. Found to have sepsis likely due to UTI, acute bronchitis. Also found in ED to be in Afib RVR on EKG, bp 90s/60s. BNP 1273, troponin 0.045 --> 0.055. Patient admits to dyspnea, cough, and wheezing. De He underwent scrotal exploration an orchiectomy on 3/18/24. In the PACU, patient was persistently hypotensive with blood pressures staying approximately 80/50 despite fluid resuscitation. Patient was placed on phenylephrine for vasopressor support and ICU was consulted for admission.  He had cardiac arrest w/ subsequent resuscitation and ROSC.    Subsequent Hospital Course:  3.19.24: Intubated/Mechanical Ventilation. AF SVR. On Vasopressor Support. Family at bedside.   3.20.24: NAD. Vented/Sedated. PAF/CVR. Dobutamine 2.5mcg/kg/min. Amiodarone 0.5mg/min.   3.21.24: NAD. Extubated. "I am ok." PAF/CVR. Levophed 0.01mcg/kg/min. Oral Amiodarone.   3.22.24: NAD. "I am feeling better." PAF/SVR but Mostly CVR. Off Pressors.     Echocardiogram (3.19.24):  Left Ventricle: The left ventricle is normal in size. Increased wall thickness. Unable to assess wall motion. There is moderately reduced systolic function with a visually estimated ejection fraction of 30 - 40%.  Right Ventricle: Mild right ventricular enlargement.  Left Atrium: Left atrium is moderately dilated.  Right Atrium: Right atrium is severely dilated.  Mitral Valve: There is moderate regurgitation.  Tricuspid Valve: There is mild to moderate regurgitation.  Other Medical History   Hypertension CHF " (congestive heart failure)   COPD (chronic obstructive pulmonary disease) Anticoagulant long-term use   A-fib Coronary artery disease   HLD (hyperlipidemia) Primary open angle glaucoma (POAG)   Aortic aneurysm PAD (peripheral artery disease)   Mitral regurgitation Chronic atrial fibrillation   Cataract      Surgical History    Heart Stent INSERTION OF STENT INTO PERIPHERAL VESSEL   ATHERECTOMY OF PERIPHERAL VESSEL CATARACT EXTRACTION W/  INTRAOCULAR LENS IMPLANT   INCISION AND DRAINAGE ORCHIECTOMY     Pre-op Assessment    I have reviewed the Patient Summary Reports.     I have reviewed the Nursing Notes. I have reviewed the NPO Status.   I have reviewed the Medications.     Review of Systems  Anesthesia Hx:  No problems with previous Anesthesia                Social:  Smoker       Cardiovascular:     Hypertension Valvular problems/Murmurs  CAD    Dysrhythmias atrial fibrillation  CHF   PVD                                  Physical Exam  General: Well nourished, Cooperative, Oriented and Somnolent    Airway:  Mallampati: III   Mouth Opening: Normal  TM Distance: Normal  Tongue: Normal  Neck ROM: Normal ROM    Dental:  Edentulous    Chest/Lungs:  Clear to auscultation, Normal Respiratory Rate    Heart:  Rate: Bradycardia  Rhythm: Irregularly Irregular  Sounds: Normal    Abdomen:  Normal, Soft, Nontender        Anesthesia Plan  Type of Anesthesia, risks & benefits discussed:    Anesthesia Type: MAC  Intra-op Monitoring Plan: Standard ASA Monitors  Post Op Pain Control Plan: multimodal analgesia  Induction:  IV  Airway Plan: Direct  Informed Consent: Informed consent signed with the Patient and all parties understand the risks and agree with anesthesia plan.  All questions answered.   ASA Score: 4  Day of Surgery Review of History & Physical: H&P Update referred to the surgeon/provider.    Ready For Surgery From Anesthesia Perspective.     .

## 2024-03-22 NOTE — PROGRESS NOTES
"  [facility]    Cardiology  Progress Note    Patient Name: Ran Reyes Jr.  MRN: [id number]  Admission Date: 3/17/2024  Hospital Length of Stay: 4 days  Code Status: Full Code   Attending Physician: Cassidy Obrien MD   Primary Care Physician: Shiela, Primary Doctor  Expected Discharge Date:   Principal Problem:Scrotal hematoma    Subjective:   Chief Complaint/Reason for Consult: OAC recs     HPI: Ran Reyes Jr. Is a 68 year old male, known to Dr. Wagner, with PMH of  cardiomyopathy, systolic heart failure with an EF of 41%, VHD, chronic AFib on Xarelto, peripheral arterial disease, COPD, HTN, and a large left testicle hydrocele who presented to the ED 3/18/24 for scrotal bleeding with testicular swelling and pain. Found to have sepsis likely due to UTI, acute bronchitis. Also found in ED to be in Afib RVR on EKG, bp 90s/60s. BNP 1273, troponin 0.045 --> 0.055. Patient admits to dyspnea, cough, and wheezing. Denies chest pains. Cardiology being consulted for OAC recommendations.    Hospital Course:  3.19.24: Intubated/Mechanical Ventilation. AF SVR. On Vasopressor Support. Family at bedside.   3.20.24: NAD. Vented/Sedated. PAF/CVR. Dobutamine 2.5mcg/kg/min. Amiodarone 0.5mg/min.   3.21.24: NAD. Extubated. "I am ok." PAF/CVR. Levophed 0.01mcg/kg/min. Oral Amiodarone.   3.22.24: NAD. "I am feeling better." PAF/SVR but Mostly CVR. Off Pressors.      PMH: Cardiomyopathy, systolic heart failure with an EF of 41%, VHD, chronic AFib on Xarelto, peripheral arterial disease, COPD, HTN, CAD (Details Unclear)  PSH: cardiac stent >10 years ago, L FA atherectomy and angioplasty, R SFA stent, cataract extraction,   Family History: Father MI at age 66.   Social History: 10+ pack year hx current smoker, social EtOH, denies drugs.     Previous Cardiac Diagnostics:   Echocardiogram (3.19.24):  Left Ventricle: The left ventricle is normal in size. Increased wall thickness. Unable to assess wall motion. There is " moderately reduced systolic function with a visually estimated ejection fraction of 30 - 40%.  Right Ventricle: Mild right ventricular enlargement.  Left Atrium: Left atrium is moderately dilated.  Right Atrium: Right atrium is severely dilated.  Mitral Valve: There is moderate regurgitation.  Tricuspid Valve: There is mild to moderate regurgitation.  Pulmonary Artery: Pulmonary artery pressure could not be estimated.  IVC/SVC: Patient is ventilated, cannot use IVC diameter to estimate right atrial pressure.    Echocardiogram (1.31.24):   Left Ventricle: The left ventricle is normal in size. Mildly increased wall thickness. There is reduced systolic function. Biplane (2D) method of discs ejection fraction is 41%. Unable to assess diastolic function due to atrial fibrillation.  Right Ventricle: Normal right ventricular cavity size. Systolic function is mildly reduced.  Left Atrium: Left atrium is severely dilated.  Right Atrium: Right atrium is severely dilated.  Aortic Valve: The aortic valve is a trileaflet valve.  Mitral Valve: There is bileaflet sclerosis. There is moderate to severe regurgitation.  Tricuspid Valve: There is mild regurgitation.  IVC/SVC: Normal venous pressure at 3 mmHg.    Carotid US (2.7.23):  The study quality is average.   1-39% stenosis in the proximal right internal carotid artery based on Bluth Criteria. Antegrade right vertebral artery flow.   1-39% stenosis in the proximal left internal carotid artery based on Bluth Criteria. Antegrade left vertebral artery flow.     Echocardiogram (11.8.22):  The study quality is average.   The left ventricle is moderately enlarged. Left ventricular diastolic dimension is 6.4 cms. Global left ventricular systolic function is moderately decreased. The left ventricular ejection fraction is 40%. Arrhythmia noted throughout much of the exam.   There is no evidence of mitral stenosis or prolapse appreciated. MV Ortiz score ~ 5. The area by planimetry is  5.3 cm². The mean trans mitral gradient is 1.6 mmHg. Suspect borderline severe (4+) mitral regurgitation with a posteriorly directed jet. MR PISA radius ~ 0.93 cm, MR ERO ~ 0.35 cm^2, MR regurgitant volume ~ 72 ml/beat, MR regurgitant fraction ~ 55%, MR vena contracta ~ 0.54 cm.  Mild calcification of the aortic valve is noted with adequate cuspal excursion.   Trace pulmonic regurgitation. Mild (1+) tricuspid regurgitation.    Peripheral Angiogram (10.17.22):  Underwent Successful CSI Atherectomy Balloon Angioplasty and Stenting of the Right SFA and CSI Atherectomy Balloon Angioplasty of Right Anterior Tibial Artery Using Pedal Approach.    Peripheral Angiogram (9.12.22):  Underwent Successful Left SFA Laser Atherectomy Balloon Angioplasty Using Left Radial Artery Approach.    Review of Systems   Constitutional: Positive for malaise/fatigue. Negative for fever.   Cardiovascular:  Negative for chest pain and leg swelling.   Respiratory:  Negative for shortness of breath.    Skin:         Scrotal Wound   All other systems reviewed and are negative.    Objective:     Vital Signs (Most Recent):  Temp: 98 °F (36.7 °C) (03/22/24 0800)  Pulse: 66 (03/22/24 0900)  Resp: 19 (03/22/24 0900)  BP: (!) 103/57 (03/22/24 0900)  SpO2: (!) 94 % (03/22/24 0900) Vital Signs (24h Range):  Temp:  [98 °F (36.7 °C)-99 °F (37.2 °C)] 98 °F (36.7 °C)  Pulse:  [] 66  Resp:  [14-29] 19  SpO2:  [89 %-100 %] 94 %  BP: ()/(51-91) 103/57   Weight: 90 kg (198 lb 6.6 oz)  Body mass index is 27.67 kg/m².  SpO2: (!) 94 %       Intake/Output Summary (Last 24 hours) at 3/22/2024 1008  Last data filed at 3/22/2024 0542  Gross per 24 hour   Intake 1371.18 ml   Output 2500 ml   Net -1128.82 ml       Lines/Drains/Airways       Central Venous Catheter Line  Duration             Percutaneous Central Line - Triple Lumen  03/19/24 0400 Internal Jugular Right 3 days              Drain  Duration                  Urethral Catheter 03/18/24 2133  Straight-tip 16 Fr. 3 days              Peripheral Intravenous Line  Duration                  Peripheral IV - Single Lumen 03/18/24 1321 20 G Left;Posterior Hand 3 days         Peripheral IV - Single Lumen 03/18/24 1930 20 G Distal;Posterior;Right Forearm 3 days         Peripheral IV - Single Lumen 03/18/24 2300 20 G Distal;Left;Posterior Forearm 3 days                  Significant Labs:   Recent Results (from the past 72 hour(s))   VANCOMYCIN, TROUGH    Collection Time: 03/19/24 10:29 AM   Result Value Ref Range    Vancomycin Trough 10.0 (L) 15.0 - 20.0 ug/ml   Triglycerides    Collection Time: 03/19/24 10:29 AM   Result Value Ref Range    Triglyceride 85 34 - 140 mg/dL   Lactic Acid, Plasma    Collection Time: 03/19/24 10:29 AM   Result Value Ref Range    Lactic Acid Level 0.9 0.5 - 2.2 mmol/L   POCT glucose    Collection Time: 03/19/24 10:55 AM   Result Value Ref Range    POCT Glucose 202 (H) 70 - 110 mg/dL   POCT glucose    Collection Time: 03/19/24  4:07 PM   Result Value Ref Range    POCT Glucose 182 (H) 70 - 110 mg/dL   Magnesium    Collection Time: 03/19/24  5:42 PM   Result Value Ref Range    Magnesium Level 2.30 1.60 - 2.60 mg/dL   Phosphorus    Collection Time: 03/19/24  5:42 PM   Result Value Ref Range    Phosphorus Level 2.2 (L) 2.3 - 4.7 mg/dL   Basic Metabolic Panel    Collection Time: 03/19/24  5:42 PM   Result Value Ref Range    Sodium Level 137 136 - 145 mmol/L    Potassium Level 3.4 (L) 3.5 - 5.1 mmol/L    Chloride 101 98 - 107 mmol/L    Carbon Dioxide 24 23 - 31 mmol/L    Glucose Level 157 (H) 82 - 115 mg/dL    Blood Urea Nitrogen 16.2 8.4 - 25.7 mg/dL    Creatinine 0.81 0.73 - 1.18 mg/dL    BUN/Creatinine Ratio 20     Calcium Level Total 7.6 (L) 8.8 - 10.0 mg/dL    Anion Gap 12.0 mEq/L    eGFR >60 mls/min/1.73/m2   POCT glucose    Collection Time: 03/19/24  9:26 PM   Result Value Ref Range    POCT Glucose 154 (H) 70 - 110 mg/dL   Troponin I    Collection Time: 03/20/24  2:06 AM   Result Value  Ref Range    Troponin-I 0.040 0.000 - 0.045 ng/mL   Lactic Acid, Plasma    Collection Time: 03/20/24  2:06 AM   Result Value Ref Range    Lactic Acid Level 1.8 0.5 - 2.2 mmol/L   Phosphorus    Collection Time: 03/20/24  2:06 AM   Result Value Ref Range    Phosphorus Level 2.8 2.3 - 4.7 mg/dL   Magnesium    Collection Time: 03/20/24  2:06 AM   Result Value Ref Range    Magnesium Level 2.30 1.60 - 2.60 mg/dL   Comprehensive metabolic panel    Collection Time: 03/20/24  2:06 AM   Result Value Ref Range    Sodium Level 136 136 - 145 mmol/L    Potassium Level 3.7 3.5 - 5.1 mmol/L    Chloride 101 98 - 107 mmol/L    Carbon Dioxide 25 23 - 31 mmol/L    Glucose Level 127 (H) 82 - 115 mg/dL    Blood Urea Nitrogen 15.2 8.4 - 25.7 mg/dL    Creatinine 0.84 0.73 - 1.18 mg/dL    Calcium Level Total 7.4 (L) 8.8 - 10.0 mg/dL    Protein Total 4.8 (L) 5.8 - 7.6 gm/dL    Albumin Level 2.0 (L) 3.4 - 4.8 g/dL    Globulin 2.8 2.4 - 3.5 gm/dL    Albumin/Globulin Ratio 0.7 (L) 1.1 - 2.0 ratio    Bilirubin Total 0.4 <=1.5 mg/dL    Alkaline Phosphatase 55 40 - 150 unit/L    Alanine Aminotransferase 12 0 - 55 unit/L    Aspartate Aminotransferase 17 5 - 34 unit/L    eGFR >60 mls/min/1.73/m2   Triglycerides    Collection Time: 03/20/24  2:06 AM   Result Value Ref Range    Triglyceride 102 34 - 140 mg/dL   CBC with Differential    Collection Time: 03/20/24  2:06 AM   Result Value Ref Range    WBC 12.28 (H) 4.50 - 11.50 x10(3)/mcL    RBC 2.72 (L) 4.70 - 6.10 x10(6)/mcL    Hgb 8.5 (L) 14.0 - 18.0 g/dL    Hct 25.7 (L) 42.0 - 52.0 %    MCV 94.5 (H) 80.0 - 94.0 fL    MCH 31.3 (H) 27.0 - 31.0 pg    MCHC 33.1 33.0 - 36.0 g/dL    RDW 16.3 11.5 - 17.0 %    Platelet 154 130 - 400 x10(3)/mcL    MPV 11.4 (H) 7.4 - 10.4 fL    NRBC% 0.0 %   Manual Differential    Collection Time: 03/20/24  2:06 AM   Result Value Ref Range    WBC 12.28 x10(3)/mcL    Neutrophils % 77 %    Lymphs % 13 %    Monocytes % 8 %    Promyelocytes % 1 (H) <=0 %    Plasmacytes % 1 %     nRBC % 1 %    Neutrophils Abs 9.4556 (H) 2.1 - 9.2 x10(3)/mcL    Lymphs Abs 1.5964 0.6 - 4.6 x10(3)/mcL    Monocytes Abs 0.9824 0.1 - 1.3 x10(3)/mcL    Platelets Normal Normal, Adequate    RBC Morph Abnormal (A) Normal    Poikilocytosis 2+ (A) (none)    Anisocytosis 1+ (A) (none)    Macrocytosis 1+ (A) (none)    [place] Cells 1+ (A) (none)   RT Blood Gas    Collection Time: 03/20/24  5:39 AM   Result Value Ref Range    Sample Type Arterial Blood     Sample site Left Radial Artery     Drawn by sd rrt     pH, Blood gas 7.510 (H) 7.350 - 7.450    pCO2, Blood gas 41.0 35.0 - 45.0 mmHg    pO2, Blood gas 73.0 (L) 80.0 - 100.0 mmHg    Sodium, Blood Gas 134 (L) 137 - 145 mmol/L    Potassium, Blood Gas 3.5 3.5 - 5.0 mmol/L    Calcium Level Ionized 1.05 (L) 1.12 - 1.23 mmol/L    TOC2, Blood gas 34.0 mmol/L    Base Excess, Blood gas 8.80 mmol/L    sO2, Blood gas 96.0 %    HCO3, Blood gas 32.7 (H) 22.0 - 26.0 mmol/L    Allens Test Yes     MODE SIMV     Oxygen Device, Blood gas Ventilator     FIO2, Blood gas 30 %    Mech Vt 500 ml    Mech RR 18 b/min    PEEP 5.0 cmH2O    PS 10.0 cmH2O   Respiratory Culture    Collection Time: 03/20/24  9:40 AM    Specimen: Sputum, Expectorated   Result Value Ref Range    Respiratory Culture Moderate Yeast (A)     GRAM STAIN Quality 2+     GRAM STAIN No bacteria seen    RT Blood Gas    Collection Time: 03/20/24  7:39 PM   Result Value Ref Range    Sample Type Arterial Blood     Sample site Right Radial Artery     Drawn by RCL CRT     pH, Blood gas 7.500 (H) 7.350 - 7.450    pCO2, Blood gas 45.0 35.0 - 45.0 mmHg    pO2, Blood gas 52.0 (LL) 80.0 - 100.0 mmHg    Sodium, Blood Gas 137 137 - 145 mmol/L    Potassium, Blood Gas 3.1 (L) 3.5 - 5.0 mmol/L    Calcium Level Ionized 1.08 (L) 1.12 - 1.23 mmol/L    TOC2, Blood gas 36.5 mmol/L    Base Excess, Blood gas 10.60 mmol/L    sO2, Blood gas 90.0 %    HCO3, Blood gas 35.1 (H) 22.0 - 26.0 mmol/L    Allens Test Yes     Oxygen Device, Blood gas Cannula     LPM  6     FIO2, Blood gas 44 %   Triglycerides    Collection Time: 03/21/24  2:19 AM   Result Value Ref Range    Triglyceride 104 34 - 140 mg/dL   Comprehensive metabolic panel    Collection Time: 03/21/24  2:19 AM   Result Value Ref Range    Sodium Level 140 136 - 145 mmol/L    Potassium Level 3.4 (L) 3.5 - 5.1 mmol/L    Chloride 101 98 - 107 mmol/L    Carbon Dioxide 29 23 - 31 mmol/L    Glucose Level 104 82 - 115 mg/dL    Blood Urea Nitrogen 10.6 8.4 - 25.7 mg/dL    Creatinine 0.79 0.73 - 1.18 mg/dL    Calcium Level Total 7.7 (L) 8.8 - 10.0 mg/dL    Protein Total 5.0 (L) 5.8 - 7.6 gm/dL    Albumin Level 2.1 (L) 3.4 - 4.8 g/dL    Globulin 2.9 2.4 - 3.5 gm/dL    Albumin/Globulin Ratio 0.7 (L) 1.1 - 2.0 ratio    Bilirubin Total 0.5 <=1.5 mg/dL    Alkaline Phosphatase 64 40 - 150 unit/L    Alanine Aminotransferase 11 0 - 55 unit/L    Aspartate Aminotransferase 21 5 - 34 unit/L    eGFR >60 mls/min/1.73/m2   Magnesium    Collection Time: 03/21/24  2:19 AM   Result Value Ref Range    Magnesium Level 2.20 1.60 - 2.60 mg/dL   Phosphorus    Collection Time: 03/21/24  2:19 AM   Result Value Ref Range    Phosphorus Level 1.7 (L) 2.3 - 4.7 mg/dL   CBC with Differential    Collection Time: 03/21/24  2:20 AM   Result Value Ref Range    WBC 9.75 4.50 - 11.50 x10(3)/mcL    RBC 2.80 (L) 4.70 - 6.10 x10(6)/mcL    Hgb 8.6 (L) 14.0 - 18.0 g/dL    Hct 27.2 (L) 42.0 - 52.0 %    MCV 97.1 (H) 80.0 - 94.0 fL    MCH 30.7 27.0 - 31.0 pg    MCHC 31.6 (L) 33.0 - 36.0 g/dL    RDW 16.0 11.5 - 17.0 %    Platelet 199 130 - 400 x10(3)/mcL    MPV 11.1 (H) 7.4 - 10.4 fL    Neut % 72.6 %    Lymph % 13.8 %    Mono % 8.6 %    Eos % 0.3 %    Basophil % 0.7 %    Lymph # 1.35 0.6 - 4.6 x10(3)/mcL    Neut # 7.07 2.1 - 9.2 x10(3)/mcL    Mono # 0.84 0.1 - 1.3 x10(3)/mcL    Eos # 0.03 0 - 0.9 x10(3)/mcL    Baso # 0.07 <=0.2 x10(3)/mcL    IG# 0.39 (H) 0 - 0.04 x10(3)/mcL    IG% 4.0 %    NRBC% 0.2 %   Occult Blood, Stool 1st Specimen    Collection Time: 03/21/24   9:39 AM   Result Value Ref Range    Stool Color 1 Green     Stool Consistancy 1 Mucoid     Occult Blood Stool 1 Positive (A) Negative   VANCOMYCIN, TROUGH    Collection Time: 03/21/24  9:51 AM   Result Value Ref Range    Vancomycin Trough 24.3 (H) 15.0 - 20.0 ug/ml   Phosphorus    Collection Time: 03/22/24  1:20 AM   Result Value Ref Range    Phosphorus Level 3.2 2.3 - 4.7 mg/dL   Magnesium    Collection Time: 03/22/24  1:20 AM   Result Value Ref Range    Magnesium Level 2.10 1.60 - 2.60 mg/dL   Comprehensive metabolic panel    Collection Time: 03/22/24  1:20 AM   Result Value Ref Range    Sodium Level 143 136 - 145 mmol/L    Potassium Level 3.2 (L) 3.5 - 5.1 mmol/L    Chloride 106 98 - 107 mmol/L    Carbon Dioxide 26 23 - 31 mmol/L    Glucose Level 108 82 - 115 mg/dL    Blood Urea Nitrogen 7.2 (L) 8.4 - 25.7 mg/dL    Creatinine 0.83 0.73 - 1.18 mg/dL    Calcium Level Total 7.9 (L) 8.8 - 10.0 mg/dL    Protein Total 5.5 (L) 5.8 - 7.6 gm/dL    Albumin Level 2.0 (L) 3.4 - 4.8 g/dL    Globulin 3.5 2.4 - 3.5 gm/dL    Albumin/Globulin Ratio 0.6 (L) 1.1 - 2.0 ratio    Bilirubin Total 0.4 <=1.5 mg/dL    Alkaline Phosphatase 59 40 - 150 unit/L    Alanine Aminotransferase 11 0 - 55 unit/L    Aspartate Aminotransferase 20 5 - 34 unit/L    eGFR >60 mls/min/1.73/m2   Triglycerides    Collection Time: 03/22/24  1:20 AM   Result Value Ref Range    Triglyceride 69 34 - 140 mg/dL   CBC with Differential    Collection Time: 03/22/24  1:20 AM   Result Value Ref Range    WBC 8.83 4.50 - 11.50 x10(3)/mcL    RBC 2.79 (L) 4.70 - 6.10 x10(6)/mcL    Hgb 8.4 (L) 14.0 - 18.0 g/dL    Hct 26.3 (L) 42.0 - 52.0 %    MCV 94.3 (H) 80.0 - 94.0 fL    MCH 30.1 27.0 - 31.0 pg    MCHC 31.9 (L) 33.0 - 36.0 g/dL    RDW 16.0 11.5 - 17.0 %    Platelet 241 130 - 400 x10(3)/mcL    MPV 10.8 (H) 7.4 - 10.4 fL    Neut % 70.5 %    Lymph % 14.0 %    Mono % 10.3 %    Eos % 0.5 %    Basophil % 0.5 %    Lymph # 1.24 0.6 - 4.6 x10(3)/mcL    Neut # 6.23 2.1 - 9.2  x10(3)/mcL    Mono # 0.91 0.1 - 1.3 x10(3)/mcL    Eos # 0.04 0 - 0.9 x10(3)/mcL    Baso # 0.04 <=0.2 x10(3)/mcL    IG# 0.37 (H) 0 - 0.04 x10(3)/mcL    IG% 4.2 %    NRBC% 0.5 %   Vancomycin, Random    Collection Time: 03/22/24  9:07 AM   Result Value Ref Range    Vanc Lvl Random 19.0 15.0 - 20.0 ug/ml     Telemetry: PAF/CVR    Physical Exam  Vitals reviewed.   Constitutional:       General: He is not in acute distress.     Appearance: Normal appearance. He is ill-appearing.   HENT:      Mouth/Throat:      Mouth: Mucous membranes are moist.   Eyes:      Extraocular Movements: Extraocular movements intact.      Conjunctiva/sclera: Conjunctivae normal.   Cardiovascular:      Rate and Rhythm: Normal rate. Rhythm irregular.      Heart sounds: Murmur heard.   Pulmonary:      Effort: Pulmonary effort is normal. No respiratory distress.      Breath sounds: Rhonchi present.      Comments: NC O2  Genitourinary:     Comments: Scrotal Sling in Place, Dressing C/D/I  Musculoskeletal:         General: No swelling.      Right lower leg: No edema.      Left lower leg: No edema.   Neurological:      General: No focal deficit present.      Mental Status: He is alert and oriented to person, place, and time.   Psychiatric:         Mood and Affect: Mood normal.         Behavior: Behavior normal.       Current Inpatient Medications:    Current Facility-Administered Medications:     acetaminophen tablet 650 mg, 650 mg, Oral, Q8H PRN, Harris Hernandez MD    amiodarone tablet 400 mg, 400 mg, Oral, BID, 400 mg at 03/21/24 2002 **FOLLOWED BY** [START ON 3/25/2024] amiodarone tablet 200 mg, 200 mg, Oral, BID **FOLLOWED BY** [START ON 3/31/2024] amiodarone tablet 200 mg, 200 mg, Oral, Daily, Joshua Hernandez ANP    atorvastatin tablet 80 mg, 80 mg, Oral, Daily, Cassidy Obrien MD, 80 mg at 03/22/24 0836    chlorhexidine 0.12 % solution 15 mL, 15 mL, Mouth/Throat, BID, David Gonsalez MD, 15 mL at 03/22/24 0836    dexmedetomidine (PRECEDEX)  400mcg/100mL 0.9% NaCL infusion, 0-1.4 mcg/kg/hr, Intravenous, Continuous, David Gonsalez MD, Stopped at 03/20/24 0939    dextrose 10 % infusion, , Intravenous, PRN, David Gonsalez MD    dextrose 10 % infusion, , Intravenous, PRN, David Gonsalez MD    dextrose 10% bolus 125 mL 125 mL, 12.5 g, Intravenous, PRN, David Gonsalez MD    dextrose 10% bolus 250 mL 250 mL, 25 g, Intravenous, PRN, David Gonsalez MD    folic acid tablet 1 mg, 1 mg, Oral, Daily, Jenna Miller MD, 1 mg at 03/22/24 0836    insulin aspart U-100 injection 0-10 Units, 0-10 Units, Subcutaneous, Q6H PRN, Narendra Pradhan DO    ipratropium 0.02 % nebulizer solution 0.5 mg, 0.5 mg, Nebulization, Q6H PRN, David Gonsalez MD    levalbuterol nebulizer solution 1.25 mg, 1.25 mg, Nebulization, Q6H, Narendra Pradhan DO, 1.25 mg at 03/22/24 0750    LORazepam tablet 2 mg, 2 mg, Oral, Q4H PRN, Jenna Miller MD, 2 mg at 03/21/24 0031    melatonin tablet 6 mg, 6 mg, Oral, Nightly PRN, Harris Hernandez MD    midodrine tablet 10 mg, 10 mg, Oral, TID WM, Narendra Pradhan DO, 10 mg at 03/22/24 0836    morphine injection 2 mg, 2 mg, Intravenous, Q6H PRN, David Gonsalez MD, 2 mg at 03/22/24 0247    multivitamin tablet, 1 tablet, Oral, Daily, Jenna Miller MD, 1 tablet at 03/22/24 0836    mupirocin 2 % ointment, , Nasal, BID, Cassidy Obrien MD    NORepinephrine 8 mg in dextrose 5% 250 mL infusion, 0-3 mcg/kg/min, Intravenous, Continuous, David Gonsalez MD, Stopped at 03/21/24 0857    oxyCODONE immediate release tablet 5 mg, 5 mg, Oral, Q4H PRN, Harris Hernandez MD, 5 mg at 03/21/24 2002    pantoprazole injection 40 mg, 40 mg, Intravenous, BID, Cassidy Obrien MD, 40 mg at 03/22/24 0835    piperacillin-tazobactam (ZOSYN) 4.5 g in dextrose 5 % in water (D5W) 100 mL IVPB (MB+), 4.5 g, Intravenous, Q8H, Harris Hernandez MD, Last Rate: 25 mL/hr at 03/22/24 0611, 4.5 g at 03/22/24 0611    scopolamine 1.3-1.5 mg (1 mg  over 3 days) 1 patch, 1 patch, Transdermal, Q3 Days, Joe Clarke MD, 1 patch at 03/20/24 1424    sodium chloride 0.9% flush 10 mL, 10 mL, Intravenous, PRN, Narendra Pradhan,     sodium chloride 0.9% flush 10 mL, 10 mL, Intravenous, PRN, David Gonsalez MD    thiamine (B-1) 500 mg in dextrose 5 % (D5W) 100 mL IVPB, 500 mg, Intravenous, TID, David Gonsalez MD, Stopped at 03/22/24 0904    vancomycin in dextrose 5 % 1 gram/250 mL IVPB 1,000 mg, 1,000 mg, Intravenous, Q12H, Cassidy Obrien MD    Facility-Administered Medications Ordered in Other Encounters:     0.9%  NaCl infusion, , Intravenous, Continuous, Shannon Milligan MD, Last Rate: 10 mL/hr at 03/09/23 1020, New Bag at 03/09/23 1020    diphenhydrAMINE injection 25 mg, 25 mg, Intravenous, Once PRN, Shannon Milligan MD    LIDOcaine (PF) 10 mg/ml (1%) injection 10 mg, 1 mL, Intradermal, Once, Lanette Ulloa FNP    LIDOcaine (PF) 10 mg/ml (1%) injection 10 mg, 1 mL, Intradermal, Once, Shannon Milligan MD    ondansetron injection 4 mg, 4 mg, Intravenous, Once, Shannon Milligan MD    prochlorperazine injection Soln 5 mg, 5 mg, Intravenous, Once PRN, Shannon Milligan MD  VTE Risk Mitigation (From admission, onward)           Ordered     IP VTE LOW RISK PATIENT  Once         03/19/24 0422     Place sequential compression device  Until discontinued         03/18/24 0124                  Assessment:   Cardiac Arrest/VT (Post Operative Left Héctor Orchiectomy - in PACU 3.18.24)     - Requiring Multiple Defibrillations (x 3)    - On Amiodarone Infusion  Chronic Atrial Fibrillation- Now AF CVR (HR 50's-60s with Intermittent PVCS)    - Xarelto on Hold Post Scrotal Abscess I&D  NSTEMI (Unspecified for Now)  Acute Hypoxemic Respiratory Failure requiring Intubation/Ventilation - Now Extubated on NC O2  Hypotension Requiring Vasopressor Support    - History of Hypertension  Valvular Heart Disease    - MR: Moderate, TR: Mild to  Moderate  Hyperlipidemia    - On Statin  Sepsis - Likely UTI Related   Scrotal Abscess    - Status Post Surgical Exploration, Left Orchiectomy, & Debridement of Wound/Wound Packing (3.18.24)   COPD  Partial Bowel Obstruction   Long Term Oral Anticoagulation  Cardiomyopathy (Unspecified)    - EF 30-40%  CAD (Details Unclear)  PAD  Infrarenal Abdominal Aortic Aneurysmal Dilatation with Mural Thrombus (Stable)    - Maximum diameter is 3.6 cm without significant interval change   Anemia with Stool + for OCB  Acute Human Metapneumovirus Infection (On Droplet Precautions)    Plan:   Continue Oral Amiodarone Load (Monitor Higinio Events) - No Further Episodes of VT  GI Consulted for Stool + OCB/Anemia   Keep K > 4.0 and Mg > 2.0   Antibiotic Management as per Primary Team  Consider Ischemic Evaluation once acute medical issues stabilize, and cleared for anticoagulation  Will Continue to Follow  Labs in AM: CBC, CMP and Mg    RAN Kim  Cardiology  [facility]  03/22/2024     I agree with the findings of the complexity of problems addressed and take responsibility for the plan's risks and complications. I approved the plan documented by Joshua Hernandez NP.    Continue ICU care  Oral amiodarone  Not stable to proceed with cath

## 2024-03-22 NOTE — PROGRESS NOTES
Pulmonary & Critical Care Medicine   Progress Note      Presenting History/HPI:  Patient is a 68-year-old male with past medical history of nonischemic cardiomyopathy, HFrEF (EF 41%), chronic atrial fibrillation on Xarelto, peripheral arterial disease, COPD, hypertension, large left hydrocele who presented to the emergency department on 03/18/2024 for what he thought to be rectal bleeding.  Patient eventually found that source was from posterior aspect of scrotum.  Patient has had persistent swelling in that region for at least the last couple of weeks.  In the emergency room patient was tachycardic and EKG showed AFib with RVR, blood pressure is were slightly on low side.  Patient did have leukocytosis of 15,000, hemoglobin had showed significant decrease from last check.  Doppler ultrasound could not rule out torsion.  Cultures were drawn and broad-spectrum antibiotics were started.  Urology was consulted and they made decision to proceed with emergent scrotal exploration.  Patient underwent surgery, seemed to tolerate well.  In the PACU, patient was persistently hypotensive with blood pressures staying approximately 80/50 despite fluid resuscitation.  Patient was placed on phenylephrine for vasopressor support and ICU was consulted for admission.     Hospital Course/Significant events:  3/17/24 - seen in ER at [facility] for scrotal pain and bleeding  3/18/24 - D, abscess removal/culture left joselito orchiectomy; subsequent hypootension and code  03/20/2024 - extubated     Interval History:  No acute events overnight.  Patient had scrotal wound redressed this morning, received pain meds during this.  States pain is now well controlled.  Now off of Levophed and maintaining blood pressures.  On nasal cannula at a couple of L, oxygenating well.  Speech is improved, patient appears to be strengthening.  No episodes of fever overnight, remains on broad-spectrum antibiotics.  Net-1100 mL over last 24 hours.  GI planning  for scoped today in setting of positive occult blood.  Hemoglobin stable    Scheduled Medications:    amiodarone  400 mg Oral BID    Followed by    [START ON 3/25/2024] amiodarone  200 mg Oral BID    Followed by    [START ON 3/31/2024] amiodarone  200 mg Oral Daily    atorvastatin  80 mg Oral Daily    chlorhexidine  15 mL Mouth/Throat BID    folic acid  1 mg Oral Daily    levalbuterol  1.25 mg Nebulization Q6H    midodrine  10 mg Oral TID WM    multivitamin  1 tablet Oral Daily    mupirocin   Nasal BID    pantoprazole  40 mg Intravenous BID    piperacillin-tazobactam (Zosyn) IV (PEDS and ADULTS) (extended infusion is not appropriate)  4.5 g Intravenous Q8H    scopolamine  1 patch Transdermal Q3 Days    thiamine (B-1) 500 mg in dextrose 5 % (D5W) 100 mL IVPB  500 mg Intravenous TID   PRN Medications:   acetaminophen, dextrose 10 % in water (D10W), dextrose 10 % in water (D10W), dextrose 10%, dextrose 10%, insulin aspart U-100, ipratropium, LORazepam, melatonin, morphine, oxyCODONE, sodium chloride 0.9%, sodium chloride 0.9%, Pharmacy to dose Vancomycin consult **AND** vancomycin - pharmacy to dose  Infusions:     dexmedeTOMIDine (Precedex) infusion (titrating) Stopped (03/20/24 0939)    NORepinephrine bitartrate-D5W Stopped (03/21/24 0857)   Fluid Balance:     Intake/Output Summary (Last 24 hours) at 3/22/2024 0654  Last data filed at 3/22/2024 0542  Gross per 24 hour   Intake 1371.18 ml   Output 2500 ml   Net -1128.82 ml     Vital Signs:   Vitals:    03/22/24 0610   BP: 100/69   Pulse: (!) 59   Resp: 18   Temp:      Physical Exam  Constitutional:       General: He is not in acute distress.     Appearance: Normal appearance. He is not ill-appearing.   HENT:      Head: Normocephalic and atraumatic.   Cardiovascular:      Rate and Rhythm: Normal rate and regular rhythm.      Pulses: Normal pulses.      Heart sounds: No murmur heard.  Pulmonary:      Effort: Pulmonary effort is normal.      Breath sounds: Rhonchi  "present.   Abdominal:      Palpations: Abdomen is soft.      Tenderness: There is no abdominal tenderness.   Genitourinary:     Comments: Scrotal wound packed  Musculoskeletal:      Right lower leg: No edema.      Left lower leg: No edema.   Skin:     Capillary Refill: Capillary refill takes less than 2 seconds.   Neurological:      General: No focal deficit present.      Mental Status: He is alert and oriented to person, place, and time.     Laboratory Studies:   No results for input(s): "PH", "PCO2", "PO2", "HCO3", "POCSATURATED", "BE" in the last 24 hours.    Recent Labs   Lab 03/22/24  0120   WBC 8.83   RBC 2.79*   HGB 8.4*   HCT 26.3*      MCV 94.3*   MCH 30.1   MCHC 31.9*       Recent Labs   Lab 03/22/24  0120   GLUCOSE 108      K 3.2*   CO2 26   BUN 7.2*   CREATININE 0.83   CALCIUM 7.9*   MG 2.10     Microbiology Data:   Microbiology Results (last 7 days)       Procedure Component Value Units Date/Time    Blood Culture [[id number]]  (Normal) Collected: 03/18/24 2226    Order Status: Completed Specimen: Blood, Venous Updated: 03/21/24 2300     CULTURE, BLOOD ([facility]) No Growth At 72 Hours    Blood culture #2 **CANNOT BE ORDERED STAT** [[id number]]  (Normal) Collected: 03/17/24 2103    Order Status: Completed Specimen: Blood Updated: 03/21/24 2200     CULTURE, BLOOD ([facility]) No Growth At 96 Hours    Blood culture #1 **CANNOT BE ORDERED STAT** [[id number]]  (Normal) Collected: 03/17/24 2103    Order Status: Completed Specimen: Blood Updated: 03/21/24 2200     CULTURE, BLOOD ([facility]) No Growth At 96 Hours    Wound Culture [[id number]]  (Abnormal)  (Susceptibility) Collected: 03/18/24 1502    Order Status: Completed Specimen: Abscess from Scrotum Updated: 03/21/24 1306     Wound Culture Many Escherichia coli      Many Streptococcus agalactiae (Group B)    Respiratory Culture [[id number]]  (Abnormal) Collected: 03/20/24 0940    Order Status: Completed Specimen: Sputum, Expectorated Updated: 03/21/24 1159     " Respiratory Culture Moderate Yeast     Comment: with no normal respiratory jt        GRAM STAIN Quality 2+      No bacteria seen    Anaerobic Culture [[id number]] Collected: 03/18/24 1502    Order Status: Completed Specimen: Abscess from Scrotum Updated: 03/21/24 0941     Anaerobe Culture No Anaerobes Isolated    Urine culture [[id number]]  (Abnormal) Collected: 03/18/24 0034    Order Status: Completed Specimen: Urine Updated: 03/19/24 1004     Urine Culture No other significant growth      10,000 - 25,000 colonies/ml Yeast species     Comment: We have not further evaluated these organisms because three or more species compatible with normal genital jt usually represents specimen contamination from the time of collection, rather than infection. If clinical circumstances warrant further   workup of these organisms, please contact the Microbiology dept at [phone]; otherwise please have the patient submit another specimen.       Chlamydia/GC, PCR [[id number]] Collected: 03/19/24 0128    Order Status: Completed Specimen: Urine Updated: 03/19/24 0527     Chlamydia trachomatis PCR Not Detected     N. gonorrhea PCR Not Detected     Source Urine    Narrative:      The Xpert CT/NG test, performed on the GeneXpert system is a qualitative in vitro real-time polymerase chain reaction (PCR) test for the automated detected and differentiation for genomic DNA from Chlamydia trachomatis (CT) and/or Neisseria gonorrhoeae (NG).    STD Urine (CT/GC/Trich) [[id number]] Collected: 03/19/24 0128    Order Status: Canceled Specimen: Urine Updated: 03/19/24 0138        Imaging:   XR Gastric tube check, non-radiologist performed  EXAMINATION:  XR GASTRIC TUBE CHECK, NON-RADIOLOGIST PERFORMED    CLINICAL HISTORY:  Correct Placement Check;    TECHNIQUE:  AP View(s) of the abdomen was performed.    COMPARISON:  03/19/2024    FINDINGS:  Enteric tube terminates in the stomach.  Side port is at the GE junction.  Suggest advancing  approximately 7 cm for more optimal positioning.    Electronically signed by: Sailaja Atkinson  Date:    03/20/2024  Time:    17:03  X-Ray Chest 1 View  EXAMINATION  XR CHEST 1 VIEW    CLINICAL HISTORY  Intubation;    TECHNIQUE  A total of 1 frontal image(s) of the chest.    COMPARISON  19 March 2024    FINDINGS  Lines/tubes/devices: Grossly unchanged positioning when allowing for differences in technique and patient rotation.    The cardiac silhouette and central vascular structures are unchanged.  The trachea is midline. Hazy ill-defined infiltrate is more pronounced at the right lower lung zone.  Left lung field remains relatively clear.  There is no enlarging pleural effusion or convincing pneumothorax.    Regional osseous structures and extrathoracic soft tissues are similar.    IMPRESSION  1. Mildly worsened ill-defined right lower lung infiltrate.  2. Remaining findings similar.    Electronically signed by: Ramana Mcleod  Date:    03/20/2024  Time:    07:45    Assessment and Plan    Assessment:  Scrotal abscess s/p I&D with left joselito orchiectomy 03/18/2024  Recent cardiac arrest with ventricular tachycardia requiring defibrillation x3 (03/18/2024)  Sepsis, likely from urinary tract infection req vasopressor support  Chronic atrial fibrillation, on anticoagulation with Xarelto  Partial bowel obstruction  HFrEF (EF 41%)  VHD  Type 2 NSTEMI  History of COPD   History of hypertension   History of hyperlipidemia     Plan:  -remains on nasal cannula, saturating well.  Continue to wean as needed to keep SpO2 greater than 92%  -now off of all vasopressor support, mean arterial pressure in good range this morning  -continuing broad-spectrum antibiotics for sepsis.  Patient remains afebrile  -cardiology following, likely will pursue ischemic workup now that patient is more stable  - DT prophylaxis, Thiamine, and folic acid and CIWA protocol   -GI planning to take patient for scope today in setting of occult GI bleed.    -monitor and replace electrolytes  -okay to downgrade to floor today       32 min of critical care time was spent reviewing the patient's chart including medications, radiographs, labs, pertinent cultures and pathology data, other consultant notes/recomendations as well as titration of vasopressors, adjustment of mechanical ventilatory or NIPPV support, as well as discussion of goals of care with nursing staff, respiratory therapy at the bedside and with family at the bedside/via phone.    ESTER

## 2024-03-22 NOTE — NURSING
Nurses Note -- 4 Eyes      3/22/2024   6:15 PM      Skin assessed during: Daily Assessment      [] No Altered Skin Integrity Present    []Prevention Measures Documented      [x] Yes- Altered Skin Integrity Present or Discovered   [] LDA Added if Not in Epic (Describe Wound)   [] New Altered Skin Integrity was Present on Admit and Documented in LDA   [] Wound Image Taken    Wound Care Consulted? Yes    Attending Nurse:  Carlos Hollis RN/Staff Member:  Silvia WINTER RN

## 2024-03-22 NOTE — CARE UPDATE
I, Dr. DARYA Seymour, assumed care of this patient today at 10am  For the patient encounter, I performed the substantive portion of the visit, I reviewed the NP/PA documentation, treatment plan, and medical decision making.  I had face to face time with this patient     Patient is a 68-year-old male with a history of nonischemic cardiomyopathy, systolic heart failure with an EF of 41%, VHD, chronic AFib on Xarelto, peripheral arterial disease, COPD, HTN, and a large left testicle hydrocele who presents to the ER complaining of what he thought was rectal bleeding as he was noting blood in the toilet and running down his legs.  Ultimately was found that the bleeding was coming from the posterior aspect of his scrotum from a superficial scrotal ulceration.  He also complained of persistent testicular swelling and pain.Doppler ultrasound was significantly limited, but could not rule out the presence of torsion.Urology was consulted and they made decision to proceed with emergent scrotal exploration.  Patient underwent surgery, seemed to tolerate well.  In the PACU, patient was persistently hypotensive with blood pressures staying approximately 80/50 despite fluid resuscitation. Upgraded to ICU for vasopressor support.Went into cardiac arrest with ventricular tachycardia requiring defibrillation x3 (03/18/2024).Remains on Antibiotics.Now off of pressors.Being downgraded to floors.GI planned for EGD. FOBT pos.      Chart reviewed. HDS. Labs , Micro data reviewed. Alert, Oriented, No focal deficits, with Scrotal wound.Rhonchi    Medical decision making:  Scrotal abscess s/p I&D with left joselito orchiectomy 03/18/2024  UTI  Abnormal Chest Imaging with Rt lower lobe Infiltrate  Human Metapneumovirus +ve  Sepsis from above requiring vasopressor supoort  Recent cardiac arrest with ventricular tachycardia requiring defibrillation x3 (03/18/2024)  AFib RVR  NSTMI, unspecified  Partial bowel obstruction  Normocytic anemia.  FOBT  positive  Electrolyte derangements   Abnormal CTA-abdominal aortic aneurysmal dilatation  with mural thrombus    Past medical history:  HFrEF (EF 41%)  VHD  History of COPD   History of hypertension   History of hyperlipidemia      Plan:  Urology on board, cleared for anticoagulation, continue dressing changes, Continue the current antibiotics.  Monitor fever curve and WBC.  On Vancomycin 3/17 and Zosyn 3/17.  Follow up microbiology data from sputum and wound.  MRSA negative.  Blood cultures so far negative.  Monitor BP, urine output off of vasopressors.  Holding antihypertensives. If pressures drop, may need vasopressor support back again.  Monitor on telemetry, monitor and replete electrolytes, on amiodarone.  May need ischemic evaluation once acute issues stabilize .  On Lovenox full dose b.i.d.  GI plan for endoscopy today.  Continue pantoprazole, monitor H&H.  Speech therapy following  PTOT -moderate intensity  Continue supportive care.            Natali Seymour MD

## 2024-03-22 NOTE — PROGRESS NOTES
UROLOGY  PROGRESS  NOTE    Ran Reyes Jr. 1955  [id number]  3/22/2024    POD 4 s/p left hemiscrotal exploration, left orchiectomy, washout/debridement of wound    Patient resting in bed  Continues with dark stools; scrotal dressing changed multiple times overnight s/t contamination from stool    afebrile   900 mL urine output overnight  WBC 8.83   H&H 8.4/26.3   BUN and creatinine 7.2/0.83    Intra-op Cultures with e coli, group B strep - on vanc and Zosyn  UC from admit with 10-25,000 colonies yeast    Intake/Output:  No intake/output data recorded.  I/O last 3 completed shifts:  In: 1914 [I.V.:31.9; IV Piggyback:1882.2]  Out: 3805 [Urine:3605; Drains:200]     Exam:    Asleep, easily arouses  Resp: nonlabored  Abd: soft, ND  : clear yellow urine draining to  bag; scrotal wound packed, dressing clean. Wounds edges clean, wound bed pink with some slough tissue, no necrosis. Minimal wound drainage      Recent Results (from the past 24 hour(s))   Phosphorus    Collection Time: 03/22/24  1:20 AM   Result Value Ref Range    Phosphorus Level 3.2 2.3 - 4.7 mg/dL   Magnesium    Collection Time: 03/22/24  1:20 AM   Result Value Ref Range    Magnesium Level 2.10 1.60 - 2.60 mg/dL   Comprehensive metabolic panel    Collection Time: 03/22/24  1:20 AM   Result Value Ref Range    Sodium Level 143 136 - 145 mmol/L    Potassium Level 3.2 (L) 3.5 - 5.1 mmol/L    Chloride 106 98 - 107 mmol/L    Carbon Dioxide 26 23 - 31 mmol/L    Glucose Level 108 82 - 115 mg/dL    Blood Urea Nitrogen 7.2 (L) 8.4 - 25.7 mg/dL    Creatinine 0.83 0.73 - 1.18 mg/dL    Calcium Level Total 7.9 (L) 8.8 - 10.0 mg/dL    Protein Total 5.5 (L) 5.8 - 7.6 gm/dL    Albumin Level 2.0 (L) 3.4 - 4.8 g/dL    Globulin 3.5 2.4 - 3.5 gm/dL    Albumin/Globulin Ratio 0.6 (L) 1.1 - 2.0 ratio    Bilirubin Total 0.4 <=1.5 mg/dL    Alkaline Phosphatase 59 40 - 150 unit/L    Alanine Aminotransferase 11 0 - 55 unit/L    Aspartate Aminotransferase 20 5 - 34 unit/L     eGFR >60 mls/min/1.73/m2   Triglycerides    Collection Time: 03/22/24  1:20 AM   Result Value Ref Range    Triglyceride 69 34 - 140 mg/dL   CBC with Differential    Collection Time: 03/22/24  1:20 AM   Result Value Ref Range    WBC 8.83 4.50 - 11.50 x10(3)/mcL    RBC 2.79 (L) 4.70 - 6.10 x10(6)/mcL    Hgb 8.4 (L) 14.0 - 18.0 g/dL    Hct 26.3 (L) 42.0 - 52.0 %    MCV 94.3 (H) 80.0 - 94.0 fL    MCH 30.1 27.0 - 31.0 pg    MCHC 31.9 (L) 33.0 - 36.0 g/dL    RDW 16.0 11.5 - 17.0 %    Platelet 241 130 - 400 x10(3)/mcL    MPV 10.8 (H) 7.4 - 10.4 fL    Neut % 70.5 %    Lymph % 14.0 %    Mono % 10.3 %    Eos % 0.5 %    Basophil % 0.5 %    Lymph # 1.24 0.6 - 4.6 x10(3)/mcL    Neut # 6.23 2.1 - 9.2 x10(3)/mcL    Mono # 0.91 0.1 - 1.3 x10(3)/mcL    Eos # 0.04 0 - 0.9 x10(3)/mcL    Baso # 0.04 <=0.2 x10(3)/mcL    IG# 0.37 (H) 0 - 0.04 x10(3)/mcL    IG% 4.2 %    NRBC% 0.5 %   Vancomycin, Random    Collection Time: 03/22/24  9:07 AM   Result Value Ref Range    Vanc Lvl Random 19.0 15.0 - 20.0 ug/ml       Assessment:  -scrotal abscess s/p left hemiscrotal exploration, left orchiectomy, washout/debridement of wound  -sepsis - on zosyn and vancomycin, BC negative thus far; wound culture with e coli and group B strep, sensitivities pending  -NSTEMI  -AFib RVR, chronic AFib  -COPD, nonischemic cardiomyopathy    Plan:  -Continue antibiotics  -OK for blood thinners from Urology standpoint  -Continue wound care with wet-to-dry dressings  -Discussed with nursing  -Following.       Qiana Valenzuela NP

## 2024-03-22 NOTE — CONSULTS
Consult Note    Reason for Consult:      We were consulted by Dr. Obrien to evaluate this patient for stool + for occult blood.     HPI:     68-year-old male unknown to our group with PMH of nonischemic cardiomyopathy (EF 41%), chronic AF on Xarelto, CAD, COPD, HTN, large hydrocele presented to ED 3/18 for what he thought was rectal bleeding, however source found to be posterior aspect of scrotum and pt was septic.  Doppler ultrasound could not rule out torsion so Urology decided to proceed with emergent scrotal exploration.  Patient tolerated procedure well but was persistently hypotensive in PACU despite fluid resuscitation so he was placed on phenylephrine for vasopressor support and admitted to ICU.  Successfully extubated on 03/20 and weaned off of pressors.  Now on NC oxygenating well.  Downgraded to floor today, awaiting bed.  Cardiology following and will pursue ischemic workup.  Nurse noted dark stool last night that smeared brown and ordered FOBT which was positive.  Lab described stool as green. GI consulted for positive FOBT.    This AM a few episodes of hypotension as low as 76/58 now normotensive and sat 94% on 2 L O2 via NC and afebrile.  On contact precautions for Human Metapneumovirus.  Hemoglobin 10.3 on admit now downtrended to 8.4.  Most recent HGB for review prior to this 14.0 on 10/23/2023.  Anemia marginally macrocytic.    Left hemiscrotal exploration with left orchiectomy and debridement of wound completed 3/18.  Path returned with wet gangrene with surface ulcer of L testicle, extensive acute inflammation.  Epididymis with intratubular fibrosis.  No evidence of malignancy. Blood culture negative.  Respiratory culture positive for yeast.     Patient denies rectal bleeding, hematochezia, melena.  No changes in bowel habits.  Admits to right lower quadrant abdominal pain that comes and goes, not worsened with p.o. intake or improved with BM.  Noticed acid reflux yesterday but not a consistent  concern.  No nausea or vomiting.    No prior colonoscopy or EGD.  No history of anemia requiring blood transfusion per patient.     Previous records reviewed...  Baseline HGB around 14.      PCP:  No, Primary Doctor    Review of patient's allergies indicates:  No Known Allergies     Current Facility-Administered Medications   Medication Dose Route Frequency Provider Last Rate Last Admin    acetaminophen tablet 650 mg  650 mg Oral Q8H PRN Harris Hernandez MD        amiodarone tablet 400 mg  400 mg Oral BID Joshua Hernandez ANP   400 mg at 03/21/24 2002    Followed by    [START ON 3/25/2024] amiodarone tablet 200 mg  200 mg Oral BID Joshua Hernandez ANP        Followed by    [START ON 3/31/2024] amiodarone tablet 200 mg  200 mg Oral Daily Joshua Hernandez ANP        atorvastatin tablet 80 mg  80 mg Oral Daily Cassidy Obrien MD   80 mg at 03/22/24 0836    chlorhexidine 0.12 % solution 15 mL  15 mL Mouth/Throat BID David Gonsalez MD   15 mL at 03/22/24 0836    dexmedetomidine (PRECEDEX) 400mcg/100mL 0.9% NaCL infusion  0-1.4 mcg/kg/hr Intravenous Continuous David Gonsalez MD   Stopped at 03/20/24 0939    dextrose 10 % infusion   Intravenous PRN David Gonsalez MD        dextrose 10 % infusion   Intravenous PRN David Gonsalez MD        dextrose 10% bolus 125 mL 125 mL  12.5 g Intravenous PRN David Gonsalez MD        dextrose 10% bolus 250 mL 250 mL  25 g Intravenous PRN David Gonsalez MD        folic acid tablet 1 mg  1 mg Oral Daily Jenna Miller MD   1 mg at 03/22/24 0836    insulin aspart U-100 injection 0-10 Units  0-10 Units Subcutaneous Q6H PRN Narendra Pradhan DO        ipratropium 0.02 % nebulizer solution 0.5 mg  0.5 mg Nebulization Q6H PRN David Gonsalez MD        levalbuterol nebulizer solution 1.25 mg  1.25 mg Nebulization Q6H Narendra Pradhan DO   1.25 mg at 03/22/24 0750    LORazepam tablet 2 mg  2 mg Oral Q4H PRN Jenna Miller MD   2 mg at 03/21/24 0031    melatonin  tablet 6 mg  6 mg Oral Nightly PRN Harris Hernandez MD        midodrine tablet 10 mg  10 mg Oral TID  Narendra Pradhan DO   10 mg at 03/22/24 0836    morphine injection 2 mg  2 mg Intravenous Q6H PRN David Gonsalez MD   2 mg at 03/22/24 0247    multivitamin tablet  1 tablet Oral Daily Jenna Miller MD   1 tablet at 03/22/24 0836    mupirocin 2 % ointment   Nasal BID Cassidy Obrien MD        NORepinephrine 8 mg in dextrose 5% 250 mL infusion  0-3 mcg/kg/min Intravenous Continuous David Gonsalez MD   Stopped at 03/21/24 0857    oxyCODONE immediate release tablet 5 mg  5 mg Oral Q4H PRN Harris Hernandez MD   5 mg at 03/21/24 2002    pantoprazole injection 40 mg  40 mg Intravenous BID Cassidy Obrien MD   40 mg at 03/22/24 0835    piperacillin-tazobactam (ZOSYN) 4.5 g in dextrose 5 % in water (D5W) 100 mL IVPB (MB+)  4.5 g Intravenous Q8H Harris Hernandez MD 25 mL/hr at 03/22/24 0611 4.5 g at 03/22/24 0611    scopolamine 1.3-1.5 mg (1 mg over 3 days) 1 patch  1 patch Transdermal Q3 Days Joe Clarke MD   1 patch at 03/20/24 1424    sodium chloride 0.9% flush 10 mL  10 mL Intravenous PRN Narendra Pradhan DO        sodium chloride 0.9% flush 10 mL  10 mL Intravenous PRN David Gonsalez MD        thiamine (B-1) 500 mg in dextrose 5 % (D5W) 100 mL IVPB  500 mg Intravenous TID David Gonsalez MD   Stopped at 03/22/24 0904    vancomycin - pharmacy to dose   Intravenous pharmacy to manage frequency Harris Hernandez MD         Facility-Administered Medications Ordered in Other Encounters   Medication Dose Route Frequency Provider Last Rate Last Admin    0.9%  NaCl infusion   Intravenous Continuous Shannon Milligan MD 10 mL/hr at 03/09/23 1020 New Bag at 03/09/23 1020    diphenhydrAMINE injection 25 mg  25 mg Intravenous Once PRN Shannon Milligan MD        LIDOcaine (PF) 10 mg/ml (1%) injection 10 mg  1 mL Intradermal Once Lanette Ulloa FNP        LIDOcaine (PF) 10  mg/ml (1%) injection 10 mg  1 mL Intradermal Once Shannon Milligan MD        ondansetron injection 4 mg  4 mg Intravenous Once Shannon Milligan MD        prochlorperazine injection Soln 5 mg  5 mg Intravenous Once PRN Shannon Milligan MD         Medications Prior to Admission   Medication Sig Dispense Refill Last Dose    clopidogreL (PLAVIX) 75 mg tablet Take 1 tablet (75 mg total) by mouth once daily. 90 tablet 3 3/17/2024    empagliflozin (JARDIANCE) 10 mg tablet Take 1 tablet (10 mg total) by mouth once daily. 90 tablet 3 3/17/2024    furosemide (LASIX) 20 MG tablet Take 1 tablet (20 mg total) by mouth once daily. 90 tablet 3 3/17/2024    hydrOXYzine HCL (ATARAX) 10 MG Tab Take 50 mg by mouth 2 (two) times daily as needed.   3/17/2024    rivaroxaban (XARELTO) 20 mg Tab Take 20 mg by mouth.   3/17/2024    sacubitriL-valsartan (ENTRESTO) 49-51 mg per tablet Take 1 tablet by mouth 2 (two) times daily. 180 tablet 3 3/17/2024    simvastatin (ZOCOR) 40 MG tablet Take 1 tablet (40 mg total) by mouth every evening. 90 tablet 3 3/17/2024    spironolactone (ALDACTONE) 25 MG tablet Take 1 tablet (25 mg total) by mouth once daily. 90 tablet 3 3/17/2024    triamcinolone acetonide 0.1% (KENALOG) 0.1 % cream Apply topically Daily.   3/17/2024    cetirizine (ZYRTEC) 10 MG tablet Take 1 tablet (10 mg total) by mouth once daily. for 14 days 14 tablet 0        Past Medical History:  Past Medical History:   Diagnosis Date    A-fib     Anticoagulant long-term use     Aortic aneurysm     Cataract     CHF (congestive heart failure)     Chronic atrial fibrillation     COPD (chronic obstructive pulmonary disease)     Coronary artery disease     HLD (hyperlipidemia)     Hypertension     Mitral regurgitation     PAD (peripheral artery disease)     Primary open angle glaucoma (POAG)       Past Surgical History:  Past Surgical History:   Procedure Laterality Date    ATHERECTOMY OF PERIPHERAL VESSEL Left 09/12/2022    LEFT SFA  ATHERECTOMY, BALLOON ANGIOPLASTY    CATARACT EXTRACTION W/  INTRAOCULAR LENS IMPLANT Left 3/9/2023    Procedure: EXTRACTION, CATARACT, WITH IOL INSERTION;  Surgeon: Georgi Borja MD;  Location: [facility] OR;  Service: Ophthalmology;  Laterality: Left;  19.5  mac    Heart Stent N/A     > 10yrs. AGO    INCISION AND DRAINAGE N/A 3/18/2024    Procedure: Incision and Drainage;  Surgeon: Fahad Rivas MD;  Location: [facility] OR;  Service: Urology;  Laterality: N/A;  I&D SCROTAL ABSCESS    INSERTION OF STENT INTO PERIPHERAL VESSEL Right 10/17/2022    RIGHT SFA ATHERECTOMY, BALLOON ANGIOPLASTY, STENT; RIGHT ANTERIOR TIBIAL ARTERY ATHERECTOMY, BALLOON ANGIOPLASTY    ORCHIECTOMY Left 3/18/2024    Procedure: ORCHIECTOMY;  Surgeon: Fahad Rivas MD;  Location: [facility] OR;  Service: Urology;  Laterality: Left;      Family History:  Family History   Problem Relation Age of Onset    Cancer Mother     Hypertension Mother     Heart failure Father     Stroke Father     Hypertension Father     No Known Problems Sister      Social History:  Social History     Tobacco Use    Smoking status: Every Day     Current packs/day: 0.25     Average packs/day: 0.3 packs/day for 40.0 years (10.0 ttl pk-yrs)     Types: Cigarettes     Passive exposure: Current    Smokeless tobacco: Never   Substance Use Topics    Alcohol use: Yes     Alcohol/week: 3.0 standard drinks of alcohol     Types: 3 Cans of beer per week     Comment: socially       Review of Systems:     Review of Systems   Constitutional:  Negative for appetite change, chills, fatigue, fever and unexpected weight change.   HENT:  Negative for trouble swallowing.    Respiratory:  Negative for cough, chest tightness, shortness of breath and wheezing.    Cardiovascular:  Negative for chest pain, palpitations and leg swelling.   Gastrointestinal:  Positive for abdominal pain (RLQ). Negative for abdominal distention, blood in stool, constipation, diarrhea, nausea and vomiting.    Genitourinary:  Positive for scrotal swelling and testicular pain.   Musculoskeletal:  Negative for arthralgias and back pain.   Skin:  Negative for color change and pallor.   Neurological:  Negative for dizziness, syncope, weakness, light-headedness and headaches.   Psychiatric/Behavioral:  Negative for agitation and confusion. The patient is not nervous/anxious.        Objective:     VITAL SIGNS: 24 HR MIN & MAX LAST    Temp  Min: 98 °F (36.7 °C)  Max: 99 °F (37.2 °C)  98 °F (36.7 °C)        BP  Min: 76/58  Max: 130/73  (!) 103/57     Pulse  Min: 56  Max: 104  66     Resp  Min: 14  Max: 29  19    SpO2  Min: 89 %  Max: 100 %  (!) 94 %        Intake/Output Summary (Last 24 hours) at 3/22/2024 0933  Last data filed at 3/22/2024 0542  Gross per 24 hour   Intake 1371.18 ml   Output 2500 ml   Net -1128.82 ml       Physical Exam  Constitutional:       General: He is not in acute distress.     Appearance: He is not ill-appearing.   HENT:      Head: Normocephalic and atraumatic.   Eyes:      General: No scleral icterus.     Extraocular Movements: Extraocular movements intact.   Cardiovascular:      Rate and Rhythm: Normal rate and regular rhythm.   Pulmonary:      Effort: Pulmonary effort is normal. No respiratory distress.      Breath sounds: Rhonchi present.      Comments: On 4L O2 via NC on arrival. Nurse weaned to 2L with 95% sat while I was in room.  Abdominal:      General: Bowel sounds are normal. There is no distension.      Palpations: Abdomen is soft. There is no mass.      Tenderness: There is no abdominal tenderness. There is no guarding or rebound.   Musculoskeletal:      Right lower leg: No edema.      Left lower leg: No edema.   Skin:     General: Skin is warm and dry.      Coloration: Skin is not jaundiced.   Neurological:      Mental Status: He is alert and oriented to person, place, and time.   Psychiatric:         Mood and Affect: Mood normal.         Behavior: Behavior normal.           Recent Results  (from the past 48 hour(s))   Respiratory Culture    Collection Time: 03/20/24  9:40 AM    Specimen: Sputum, Expectorated   Result Value Ref Range    Respiratory Culture Moderate Yeast (A)     GRAM STAIN Quality 2+     GRAM STAIN No bacteria seen    RT Blood Gas    Collection Time: 03/20/24  7:39 PM   Result Value Ref Range    Sample Type Arterial Blood     Sample site Right Radial Artery     Drawn by RCL CRT     pH, Blood gas 7.500 (H) 7.350 - 7.450    pCO2, Blood gas 45.0 35.0 - 45.0 mmHg    pO2, Blood gas 52.0 (LL) 80.0 - 100.0 mmHg    Sodium, Blood Gas 137 137 - 145 mmol/L    Potassium, Blood Gas 3.1 (L) 3.5 - 5.0 mmol/L    Calcium Level Ionized 1.08 (L) 1.12 - 1.23 mmol/L    TOC2, Blood gas 36.5 mmol/L    Base Excess, Blood gas 10.60 mmol/L    sO2, Blood gas 90.0 %    HCO3, Blood gas 35.1 (H) 22.0 - 26.0 mmol/L    Allens Test Yes     Oxygen Device, Blood gas Cannula     LPM 6     FIO2, Blood gas 44 %   Triglycerides    Collection Time: 03/21/24  2:19 AM   Result Value Ref Range    Triglyceride 104 34 - 140 mg/dL   Comprehensive metabolic panel    Collection Time: 03/21/24  2:19 AM   Result Value Ref Range    Sodium Level 140 136 - 145 mmol/L    Potassium Level 3.4 (L) 3.5 - 5.1 mmol/L    Chloride 101 98 - 107 mmol/L    Carbon Dioxide 29 23 - 31 mmol/L    Glucose Level 104 82 - 115 mg/dL    Blood Urea Nitrogen 10.6 8.4 - 25.7 mg/dL    Creatinine 0.79 0.73 - 1.18 mg/dL    Calcium Level Total 7.7 (L) 8.8 - 10.0 mg/dL    Protein Total 5.0 (L) 5.8 - 7.6 gm/dL    Albumin Level 2.1 (L) 3.4 - 4.8 g/dL    Globulin 2.9 2.4 - 3.5 gm/dL    Albumin/Globulin Ratio 0.7 (L) 1.1 - 2.0 ratio    Bilirubin Total 0.5 <=1.5 mg/dL    Alkaline Phosphatase 64 40 - 150 unit/L    Alanine Aminotransferase 11 0 - 55 unit/L    Aspartate Aminotransferase 21 5 - 34 unit/L    eGFR >60 mls/min/1.73/m2   Magnesium    Collection Time: 03/21/24  2:19 AM   Result Value Ref Range    Magnesium Level 2.20 1.60 - 2.60 mg/dL   Phosphorus    Collection  Time: 03/21/24  2:19 AM   Result Value Ref Range    Phosphorus Level 1.7 (L) 2.3 - 4.7 mg/dL   CBC with Differential    Collection Time: 03/21/24  2:20 AM   Result Value Ref Range    WBC 9.75 4.50 - 11.50 x10(3)/mcL    RBC 2.80 (L) 4.70 - 6.10 x10(6)/mcL    Hgb 8.6 (L) 14.0 - 18.0 g/dL    Hct 27.2 (L) 42.0 - 52.0 %    MCV 97.1 (H) 80.0 - 94.0 fL    MCH 30.7 27.0 - 31.0 pg    MCHC 31.6 (L) 33.0 - 36.0 g/dL    RDW 16.0 11.5 - 17.0 %    Platelet 199 130 - 400 x10(3)/mcL    MPV 11.1 (H) 7.4 - 10.4 fL    Neut % 72.6 %    Lymph % 13.8 %    Mono % 8.6 %    Eos % 0.3 %    Basophil % 0.7 %    Lymph # 1.35 0.6 - 4.6 x10(3)/mcL    Neut # 7.07 2.1 - 9.2 x10(3)/mcL    Mono # 0.84 0.1 - 1.3 x10(3)/mcL    Eos # 0.03 0 - 0.9 x10(3)/mcL    Baso # 0.07 <=0.2 x10(3)/mcL    IG# 0.39 (H) 0 - 0.04 x10(3)/mcL    IG% 4.0 %    NRBC% 0.2 %   Occult Blood, Stool 1st Specimen    Collection Time: 03/21/24  9:39 AM   Result Value Ref Range    Stool Color 1 Green     Stool Consistancy 1 Mucoid     Occult Blood Stool 1 Positive (A) Negative   VANCOMYCIN, TROUGH    Collection Time: 03/21/24  9:51 AM   Result Value Ref Range    Vancomycin Trough 24.3 (H) 15.0 - 20.0 ug/ml   Phosphorus    Collection Time: 03/22/24  1:20 AM   Result Value Ref Range    Phosphorus Level 3.2 2.3 - 4.7 mg/dL   Magnesium    Collection Time: 03/22/24  1:20 AM   Result Value Ref Range    Magnesium Level 2.10 1.60 - 2.60 mg/dL   Comprehensive metabolic panel    Collection Time: 03/22/24  1:20 AM   Result Value Ref Range    Sodium Level 143 136 - 145 mmol/L    Potassium Level 3.2 (L) 3.5 - 5.1 mmol/L    Chloride 106 98 - 107 mmol/L    Carbon Dioxide 26 23 - 31 mmol/L    Glucose Level 108 82 - 115 mg/dL    Blood Urea Nitrogen 7.2 (L) 8.4 - 25.7 mg/dL    Creatinine 0.83 0.73 - 1.18 mg/dL    Calcium Level Total 7.9 (L) 8.8 - 10.0 mg/dL    Protein Total 5.5 (L) 5.8 - 7.6 gm/dL    Albumin Level 2.0 (L) 3.4 - 4.8 g/dL    Globulin 3.5 2.4 - 3.5 gm/dL    Albumin/Globulin Ratio 0.6 (L)  1.1 - 2.0 ratio    Bilirubin Total 0.4 <=1.5 mg/dL    Alkaline Phosphatase 59 40 - 150 unit/L    Alanine Aminotransferase 11 0 - 55 unit/L    Aspartate Aminotransferase 20 5 - 34 unit/L    eGFR >60 mls/min/1.73/m2   Triglycerides    Collection Time: 03/22/24  1:20 AM   Result Value Ref Range    Triglyceride 69 34 - 140 mg/dL   CBC with Differential    Collection Time: 03/22/24  1:20 AM   Result Value Ref Range    WBC 8.83 4.50 - 11.50 x10(3)/mcL    RBC 2.79 (L) 4.70 - 6.10 x10(6)/mcL    Hgb 8.4 (L) 14.0 - 18.0 g/dL    Hct 26.3 (L) 42.0 - 52.0 %    MCV 94.3 (H) 80.0 - 94.0 fL    MCH 30.1 27.0 - 31.0 pg    MCHC 31.9 (L) 33.0 - 36.0 g/dL    RDW 16.0 11.5 - 17.0 %    Platelet 241 130 - 400 x10(3)/mcL    MPV 10.8 (H) 7.4 - 10.4 fL    Neut % 70.5 %    Lymph % 14.0 %    Mono % 10.3 %    Eos % 0.5 %    Basophil % 0.5 %    Lymph # 1.24 0.6 - 4.6 x10(3)/mcL    Neut # 6.23 2.1 - 9.2 x10(3)/mcL    Mono # 0.91 0.1 - 1.3 x10(3)/mcL    Eos # 0.04 0 - 0.9 x10(3)/mcL    Baso # 0.04 <=0.2 x10(3)/mcL    IG# 0.37 (H) 0 - 0.04 x10(3)/mcL    IG% 4.2 %    NRBC% 0.5 %       XR Gastric tube check, non-radiologist performed    Result Date: 3/20/2024  EXAMINATION: XR GASTRIC TUBE CHECK, NON-RADIOLOGIST PERFORMED CLINICAL HISTORY: Correct Placement Check; TECHNIQUE: AP View(s) of the abdomen was performed. COMPARISON: 03/19/2024 FINDINGS: Enteric tube terminates in the stomach.  Side port is at the GE junction.  Suggest advancing approximately 7 cm for more optimal positioning. Electronically signed by: Sailaja Atkinson Date:    03/20/2024 Time:    17:03    X-Ray Chest 1 View    Result Date: 3/20/2024  EXAMINATION XR CHEST 1 VIEW CLINICAL HISTORY Intubation; TECHNIQUE A total of 1 frontal image(s) of the chest. COMPARISON 19 March 2024 FINDINGS Lines/tubes/devices: Grossly unchanged positioning when allowing for differences in technique and patient rotation. The cardiac silhouette and central vascular structures are unchanged.  The trachea is  midline. Hazy ill-defined infiltrate is more pronounced at the right lower lung zone.  Left lung field remains relatively clear.  There is no enlarging pleural effusion or convincing pneumothorax. Regional osseous structures and extrathoracic soft tissues are similar. IMPRESSION 1. Mildly worsened ill-defined right lower lung infiltrate. 2. Remaining findings similar. Electronically signed by: Ramana Mcleod Date:    03/20/2024 Time:    07:45    X-Ray Abdomen Flat And Erect    Result Date: 3/19/2024  EXAMINATION: XR ABDOMEN FLAT AND ERECT CLINICAL HISTORY: r/o toxic megacolon; TECHNIQUE: Supine and upright views of the abdomen performed. COMPARISON: 03/18/2024 FINDINGS: Enteric tube terminates at the stomach.  Persistent gaseous distension the bowel, overall improved compared to previous. No evidence of free intraperitoneal air. No appreciable acute osseous abnormality.     Persistent though improved gaseous distension of the bowel. Electronically signed by: Sailaja Atkinson Date:    03/19/2024 Time:    12:08    Echo Saline Bubble? Yes    Result Date: 3/19/2024    Left Ventricle: The left ventricle is normal in size. Increased wall thickness. Unable to assess wall motion. There is moderately reduced systolic function with a visually estimated ejection fraction of 30 - 40%.   Right Ventricle: Mild right ventricular enlargement.   Left Atrium: Left atrium is moderately dilated.   Right Atrium: Right atrium is severely dilated.   Mitral Valve: There is moderate regurgitation.   Tricuspid Valve: There is mild to moderate regurgitation.   Pulmonary Artery: Pulmonary artery pressure could not be estimated.   IVC/SVC: Patient is ventilated, cannot use IVC diameter to estimate right atrial pressure.     CT Abdomen Pelvis With IV Contrast NO Oral Contrast    Result Date: 3/19/2024  EXAMINATION: CT ABDOMEN PELVIS WITH IV CONTRAST CLINICAL HISTORY: Abdominal abscess/infection suspected; TECHNIQUE: CT imaging was performed of the  abdomen and pelvis after the administration of intravenous contrast. Dose length product is 1032 mGycm. Automatic exposure control, adjustment of mA/kV or iterative reconstruction technique was used to limit radiation dose. COMPARISON: CT abdomen pelvis dated 03/17/2024 FINDINGS: Liver: Normal. Gallbladder and biliary tree: No calcified gallstones. No intra or extrahepatic biliary ductal dilation. Pancreas: Normal. Spleen: Normal. Adrenals: Normal. Kidneys and ureters: Normal. Bladder: The urinary catheter is in place. Reproductive organs: There is partially visualized left scrotal wall thickening, with air visualized in the inguinal canal, likely postoperative. Stomach/bowel: A nasogastric tube has its tip in the stomach.  No bowel obstruction. Lymph nodes: No pathologically enlarged lymph node identified. Peritoneum: Trace ascites. Vessels: Moderate scattered vascular calcifications. Abdominal wall: Normal. Lung bases: Small bilateral pleural effusions with basilar atelectasis.  The heart is enlarged. Bones: No acute osseous findings.     1. Small pleural effusions with basilar atelectasis. 2. Trace ascites. 3. Postoperative changes of the scrotum with left scrotal wall thickening. Electronically signed by: Danni Johnson Date:    03/19/2024 Time:    08:02    X-Ray Chest 1 View    Result Date: 3/19/2024  EXAMINATION: XR CHEST 1 VIEW CPT 54065 CLINICAL HISTORY: central line placement confirmation; COMPARISON: March 18, 2024 FINDINGS: Examination reveals cardiomediastinal silhouette and pleuroparenchymal changes to be essentially unchanged as compared with the previous exam. Interval insertion of central line from a right approach tip projecting over the region of the superior vena cava     No significant change. Central line insertion Electronically signed by: Lito Hurst Date:    03/19/2024 Time:    05:35    XR Gastric tube check, non-radiologist performed    Result Date: 3/18/2024  EXAMINATION: XR GASTRIC  TUBE CHECK, NON-RADIOLOGIST PERFORMED CLINICAL HISTORY: NG placement; TECHNIQUE: One view COMPARISON: March 18, 2024 FINDINGS: Nasogastric tube traverses the gastroesophageal junction and tip of tube is within the left upper quadrant abdomen in the expected location of gastric fundus.  There is adequate length of the tube within the stomach.     Nasogastric tube terminates within the gastric fundus. Electronically signed by: Mickey West Date:    03/18/2024 Time:    23:20    X-Ray Chest 1 View    Result Date: 3/18/2024  EXAMINATION: XR CHEST 1 VIEW CLINICAL HISTORY: ETT confirmation; TECHNIQUE: One view COMPARISON: March 17, 2024. FINDINGS: Optimal intubation and endotracheal tube tip is about the medial clavicular heads.  Nasogastric tube traverses into the stomach.  Cardiopericardial silhouette enlarged appearance similar. No dense focal or segmental consolidation, congestive process, pleural effusions or pneumothorax.     Optimal intubation. Electronically signed by: Mickey West Date:    03/18/2024 Time:    22:23    XR Gastric tube check, non-radiologist performed    Result Date: 3/18/2024  EXAMINATION: XR GASTRIC TUBE CHECK, NON-RADIOLOGIST PERFORMED CLINICAL HISTORY: NG placement confirmation; TECHNIQUE: One view FINDINGS: Nasogastric tube traverses the gastroesophageal junction and loops within the gastric fundus.  Tip of the tube is again within the gastric fundus.     Nasogastric tube terminates within the gastric fundus. Electronically signed by: Mickey West Date:    03/18/2024 Time:    22:22    US SCROTUM AND TESTICLES WITH DOPPLER (XPD)    Result Date: 3/18/2024  FINDINGS: Scan is limited due to patient's pain level not allowing him to tolerate the scan.  There is pronounced edematous thickening of the superficial soft tissues of the scrotum with a thickness of 1.7 cm.  The right and left testicles are not clearly separately visualize.  There is a single 12.4 x 6.6 x 8 cm hypoechoic structure in the  midline (possibly representing testicle with no appreciable blood flow identified exact nature of these abnormality cannot be ascertained by this exam     Limited scan due to significant pain. Pronounce edematous thickening of the superficial soft tissues measure approximately 1.7 cm. Inability to  the right and left testicles with a single hypoechoic structure in the midline with measurements as above and not appreciable blood flow exact nature of these abnormality cannot be completely ascertained by this exam changes suggestive of testicular torsion are not completely excluded clinical correlation is suggested. This report was flagged in Epic as abnormal. Electronically signed by: Lito Hurst Date:    03/18/2024 Time:    06:44    X-Ray Chest 1 View    Result Date: 3/18/2024  EXAMINATION: XR CHEST 1 VIEW CPT 32610 CLINICAL HISTORY: chf; COMPARISON: October 23, 2023 FINDINGS: Examination reveals mediastinal silhouette to be within normal limits cardiac silhouette is enlarged lung fields reveal some increase interstitial markings with no focal consolidative changes atelectases effusions or pneumothoraces     Cardiomegaly. Minimal increase interstitial markings Electronically signed by: Lito Hurst Date:    03/18/2024 Time:    05:18    CTA Abdomen and Pelvis    Result Date: 3/17/2024  EXAMINATION: CTA ABDOMEN AND PELVIS CLINICAL HISTORY: bleeding from rectum posterior scrotum, hx of AAA, severely enlarged scrotum; TECHNIQUE: Multidetector axial images were obtained of the abdomen and pelvis before and following the administration of IV contrast. Oral contrast was not administered. Dose length product of 968 mGycm. Automated exposure control was utilized to minimize radiation dose. COMPARISON: CT brain October 25, 2023. FINDINGS: There is trace of right dependent pleural effusion.  Visualized portion of the lungs are without acute consolidation or congestive process.  Cardiac chambers are enlarged  in size. Hepatic volume and attenuation is unremarkable. Gallbladder wall is not thickened and there is no intra luminal calcified calculus. No biliary dilation identified. Pancreatic unremarkable attenuation without acute peripancreatic phlegmons. Main pancreatic duct is not dilated. Spleen is of normal size without focal lesion. Bilateral adrenal glands mild thickening suggest hyperplasia with similar appearance..  There is left kidney upper pole scarring. There is no hydronephrosis.  There are similar mild perinephric strandings. There is abdominal aortic aneurysmal dilatation with anterior aspect mural thrombus on image 114 series 15 where maximum diameter is 3.6 cm without significant interval change.  There are no signs of abdominal aortic dissection or periaortic inflammation or contrast extravasation.  Calcified plaques involve iliac arteries.  Flow is present bilaterally within the internal external iliac arteries to the femoral arteries.  There is no major vascular contrast extravasation. Stomach is mostly decompressed.  No abnormal dilatation of the loops of small bowel.  There is no focal or generalized mural thickening of the small bowel loops.  There is moderate colonic fecal loading without abnormal dilatation or pericolonic acute strandings.  There is no active contrast extravasation within colon, especially the anorectum to suggest active gastrointestinal hemorrhage. There is large amount of hyperdense hemorrhagic fluid within the scrotum.  There is scrotal central aspect hypodensity with thin rim which measures 11.0 cm by 5.0 cm and may represent a hydrocele.  There is no active bleeding within the scrotum.  The cause for scrotal hemorrhage is not determined on this exam.  Testicles are not delineated as discrete structures.  Please further assess with ultrasound exam.  Urinary bladder is decompressed with slightly thickened wall.  There are multiple prostatic calcifications.  Bilateral inguinal  fat containing hernias.  There are bilateral inguinal up to 1.2 cm enlarged lymph nodes. No acute or otherwise osseous abnormality identified.     1. Stable infrarenal abdominal aortic aneurysmal dilatation with mural thrombus.  No contrast extravasation from major abdominopelvic vasculature.. 2. No active gastrointestinal hemorrhage identified. 3. Large amount of hyperdense hemorrhagic fluid within the scrotum without active bleeding.  The source of this hemorrhage is not determined on this exam.  This may be secondary to scrotal vasculature.  Please further assess with ultrasound exam with Doppler. Electronically signed by: Mickey West Date:    03/17/2024 Time:    21:44        Assessment / Plan:     68-year-old male unknown to our group with PMH of nonischemic cardiomyopathy (EF 41%), chronic AF on Xarelto, CAD, COPD, HTN, large hydrocele presented to ED 3/18 for what he thought was rectal bleeding, however source found to be posterior aspect of scrotum and pt was septic.  Doppler ultrasound could not rule out torsion so Urology decided to proceed with emergent scrotal exploration.  Patient became hypotensive despite fluid resuscitation and PACU and was placed on pressor support and admitted to ICU.  Path report positive for wet gangrene.  On contact precautions for Human Metapneumovirus.  Stool dark but smears brown and noted as green by lab but FOBT positive.  HGB down trended from 10 to 8 during admission.  Successfully extubated on 03/20 and now weaned off of pressors and downgraded to floor awaiting bed.  GI consulted for FOBT positive stool.      Acute macrocytic anemia  Hgb 10.3--8.2--8.7--8.5--8.6--8.4  Baseline around 14 past year.  2.   FOBT + stool  Described as green by lab  No overt GIB  3.  Afib on Xarelto  4.  Scrotal abscess s/p I&D w/L joselito orchiectomy  Path + for wet gangrene    - Continue ppi  - Monitor H/H and transfuse as needed to Hgb 7  - Monitor stools for bleeding  - keep NPO for EGD this  AM. Further recs pending findings.    Thank you for allowing us to participate in this patient's care.

## 2024-03-23 LAB
ALBUMIN SERPL-MCNC: 2.2 G/DL (ref 3.4–4.8)
ALBUMIN/GLOB SERPL: 0.6 RATIO (ref 1.1–2)
ALP SERPL-CCNC: 64 UNIT/L (ref 40–150)
ALT SERPL-CCNC: 9 UNIT/L (ref 0–55)
AST SERPL-CCNC: 21 UNIT/L (ref 5–34)
BACTERIA BLD CULT: NORMAL
BASOPHILS # BLD AUTO: 0.04 X10(3)/MCL
BASOPHILS NFR BLD AUTO: 0.5 %
BILIRUB SERPL-MCNC: 0.4 MG/DL
BUN SERPL-MCNC: 11.6 MG/DL (ref 8.4–25.7)
CALCIUM SERPL-MCNC: 8.2 MG/DL (ref 8.8–10)
CHLORIDE SERPL-SCNC: 106 MMOL/L (ref 98–107)
CO2 SERPL-SCNC: 25 MMOL/L (ref 23–31)
CREAT SERPL-MCNC: 1.08 MG/DL (ref 0.73–1.18)
EOSINOPHIL # BLD AUTO: 0.08 X10(3)/MCL (ref 0–0.9)
EOSINOPHIL NFR BLD AUTO: 0.9 %
ERYTHROCYTE [DISTWIDTH] IN BLOOD BY AUTOMATED COUNT: 16.8 % (ref 11.5–17)
GFR SERPLBLD CREATININE-BSD FMLA CKD-EPI: >60 MLS/MIN/1.73/M2
GLOBULIN SER-MCNC: 3.9 GM/DL (ref 2.4–3.5)
GLUCOSE SERPL-MCNC: 121 MG/DL (ref 82–115)
HCT VFR BLD AUTO: 26.2 % (ref 42–52)
HGB BLD-MCNC: 8.5 G/DL (ref 14–18)
IMM GRANULOCYTES # BLD AUTO: 0.39 X10(3)/MCL (ref 0–0.04)
IMM GRANULOCYTES NFR BLD AUTO: 4.5 %
LYMPHOCYTES # BLD AUTO: 1.41 X10(3)/MCL (ref 0.6–4.6)
LYMPHOCYTES NFR BLD AUTO: 16.2 %
MAGNESIUM SERPL-MCNC: 2.4 MG/DL (ref 1.6–2.6)
MCH RBC QN AUTO: 30.9 PG (ref 27–31)
MCHC RBC AUTO-ENTMCNC: 32.4 G/DL (ref 33–36)
MCV RBC AUTO: 95.3 FL (ref 80–94)
MONOCYTES # BLD AUTO: 0.91 X10(3)/MCL (ref 0.1–1.3)
MONOCYTES NFR BLD AUTO: 10.4 %
NEUTROPHILS # BLD AUTO: 5.89 X10(3)/MCL (ref 2.1–9.2)
NEUTROPHILS NFR BLD AUTO: 67.5 %
NRBC BLD AUTO-RTO: 0.5 %
PHOSPHATE SERPL-MCNC: 2.9 MG/DL (ref 2.3–4.7)
PLATELET # BLD AUTO: 339 X10(3)/MCL (ref 130–400)
PMV BLD AUTO: 10.7 FL (ref 7.4–10.4)
POTASSIUM SERPL-SCNC: 4 MMOL/L (ref 3.5–5.1)
PROT SERPL-MCNC: 6.1 GM/DL (ref 5.8–7.6)
RBC # BLD AUTO: 2.75 X10(6)/MCL (ref 4.7–6.1)
SODIUM SERPL-SCNC: 143 MMOL/L (ref 136–145)
TRIGL SERPL-MCNC: 61 MG/DL (ref 34–140)
WBC # SPEC AUTO: 8.72 X10(3)/MCL (ref 4.5–11.5)

## 2024-03-23 PROCEDURE — 25000242 PHARM REV CODE 250 ALT 637 W/ HCPCS: Performed by: STUDENT IN AN ORGANIZED HEALTH CARE EDUCATION/TRAINING PROGRAM

## 2024-03-23 PROCEDURE — 80053 COMPREHEN METABOLIC PANEL: CPT

## 2024-03-23 PROCEDURE — 63600175 PHARM REV CODE 636 W HCPCS: Performed by: INTERNAL MEDICINE

## 2024-03-23 PROCEDURE — 63600175 PHARM REV CODE 636 W HCPCS: Performed by: STUDENT IN AN ORGANIZED HEALTH CARE EDUCATION/TRAINING PROGRAM

## 2024-03-23 PROCEDURE — 94799 UNLISTED PULMONARY SVC/PX: CPT | Mod: XB

## 2024-03-23 PROCEDURE — 85025 COMPLETE CBC W/AUTO DIFF WBC: CPT

## 2024-03-23 PROCEDURE — C9113 INJ PANTOPRAZOLE SODIUM, VIA: HCPCS | Performed by: INTERNAL MEDICINE

## 2024-03-23 PROCEDURE — 99900031 HC PATIENT EDUCATION (STAT)

## 2024-03-23 PROCEDURE — 25000003 PHARM REV CODE 250: Performed by: STUDENT IN AN ORGANIZED HEALTH CARE EDUCATION/TRAINING PROGRAM

## 2024-03-23 PROCEDURE — 25000003 PHARM REV CODE 250: Performed by: INTERNAL MEDICINE

## 2024-03-23 PROCEDURE — 84478 ASSAY OF TRIGLYCERIDES: CPT

## 2024-03-23 PROCEDURE — 27000221 HC OXYGEN, UP TO 24 HOURS

## 2024-03-23 PROCEDURE — 83735 ASSAY OF MAGNESIUM: CPT

## 2024-03-23 PROCEDURE — 63600175 PHARM REV CODE 636 W HCPCS

## 2024-03-23 PROCEDURE — 94760 N-INVAS EAR/PLS OXIMETRY 1: CPT

## 2024-03-23 PROCEDURE — 84100 ASSAY OF PHOSPHORUS: CPT

## 2024-03-23 PROCEDURE — 25000003 PHARM REV CODE 250

## 2024-03-23 PROCEDURE — 94640 AIRWAY INHALATION TREATMENT: CPT

## 2024-03-23 PROCEDURE — 99900035 HC TECH TIME PER 15 MIN (STAT)

## 2024-03-23 PROCEDURE — 27000207 HC ISOLATION

## 2024-03-23 PROCEDURE — 11000001 HC ACUTE MED/SURG PRIVATE ROOM

## 2024-03-23 RX ADMIN — PIPERACILLIN AND TAZOBACTAM 4.5 G: 4; .5 INJECTION, POWDER, LYOPHILIZED, FOR SOLUTION INTRAVENOUS; PARENTERAL at 12:03

## 2024-03-23 RX ADMIN — PANTOPRAZOLE SODIUM 40 MG: 40 INJECTION, POWDER, FOR SOLUTION INTRAVENOUS at 09:03

## 2024-03-23 RX ADMIN — LEVALBUTEROL HYDROCHLORIDE 1.25 MG: 1.25 SOLUTION RESPIRATORY (INHALATION) at 07:03

## 2024-03-23 RX ADMIN — ENOXAPARIN SODIUM 90 MG: 100 INJECTION SUBCUTANEOUS at 09:03

## 2024-03-23 RX ADMIN — MUPIROCIN: 20 OINTMENT TOPICAL at 08:03

## 2024-03-23 RX ADMIN — MUPIROCIN: 20 OINTMENT TOPICAL at 09:03

## 2024-03-23 RX ADMIN — THIAMINE HYDROCHLORIDE 500 MG: 100 INJECTION, SOLUTION INTRAMUSCULAR; INTRAVENOUS at 03:03

## 2024-03-23 RX ADMIN — CHLORHEXIDINE GLUCONATE 0.12% ORAL RINSE 15 ML: 1.2 LIQUID ORAL at 09:03

## 2024-03-23 RX ADMIN — LEVALBUTEROL HYDROCHLORIDE 1.25 MG: 1.25 SOLUTION RESPIRATORY (INHALATION) at 01:03

## 2024-03-23 RX ADMIN — PIPERACILLIN AND TAZOBACTAM 4.5 G: 4; .5 INJECTION, POWDER, LYOPHILIZED, FOR SOLUTION INTRAVENOUS; PARENTERAL at 08:03

## 2024-03-23 RX ADMIN — ENOXAPARIN SODIUM 90 MG: 100 INJECTION SUBCUTANEOUS at 08:03

## 2024-03-23 RX ADMIN — THIAMINE HYDROCHLORIDE 500 MG: 100 INJECTION, SOLUTION INTRAMUSCULAR; INTRAVENOUS at 09:03

## 2024-03-23 RX ADMIN — SUCRALFATE 1 G: 1 TABLET ORAL at 09:03

## 2024-03-23 RX ADMIN — OXYCODONE HYDROCHLORIDE 5 MG: 5 TABLET ORAL at 06:03

## 2024-03-23 RX ADMIN — SUCRALFATE 1 G: 1 TABLET ORAL at 12:03

## 2024-03-23 RX ADMIN — SCOPOLAMINE 1 PATCH: 1 PATCH TRANSDERMAL at 03:03

## 2024-03-23 RX ADMIN — MIDODRINE HYDROCHLORIDE 10 MG: 5 TABLET ORAL at 05:03

## 2024-03-23 RX ADMIN — SUCRALFATE 1 G: 1 TABLET ORAL at 04:03

## 2024-03-23 RX ADMIN — MIDODRINE HYDROCHLORIDE 10 MG: 5 TABLET ORAL at 12:03

## 2024-03-23 RX ADMIN — SUCRALFATE 1 G: 1 TABLET ORAL at 06:03

## 2024-03-23 RX ADMIN — PIPERACILLIN AND TAZOBACTAM 4.5 G: 4; .5 INJECTION, POWDER, LYOPHILIZED, FOR SOLUTION INTRAVENOUS; PARENTERAL at 05:03

## 2024-03-23 RX ADMIN — PANTOPRAZOLE SODIUM 40 MG: 40 INJECTION, POWDER, FOR SOLUTION INTRAVENOUS at 08:03

## 2024-03-23 RX ADMIN — MIDODRINE HYDROCHLORIDE 10 MG: 5 TABLET ORAL at 06:03

## 2024-03-23 RX ADMIN — LEVALBUTEROL HYDROCHLORIDE 1.25 MG: 1.25 SOLUTION RESPIRATORY (INHALATION) at 08:03

## 2024-03-23 RX ADMIN — LEVALBUTEROL HYDROCHLORIDE 1.25 MG: 1.25 SOLUTION RESPIRATORY (INHALATION) at 02:03

## 2024-03-23 RX ADMIN — ATORVASTATIN CALCIUM 80 MG: 40 TABLET, FILM COATED ORAL at 08:03

## 2024-03-23 RX ADMIN — FOLIC ACID 1 MG: 1 TABLET ORAL at 08:03

## 2024-03-23 RX ADMIN — CHLORHEXIDINE GLUCONATE 0.12% ORAL RINSE 15 ML: 1.2 LIQUID ORAL at 08:03

## 2024-03-23 RX ADMIN — THIAMINE HYDROCHLORIDE 500 MG: 100 INJECTION, SOLUTION INTRAMUSCULAR; INTRAVENOUS at 08:03

## 2024-03-23 RX ADMIN — THERA TABS 1 TABLET: TAB at 08:03

## 2024-03-23 NOTE — PROGRESS NOTES
[facility] Medicine Progress Note        Chief Complaint: Inpatient Follow-up for bleeding    HPI: Patient is a 68-year-old male with a history of nonischemic cardiomyopathy, systolic heart failure with an EF of 41%, VHD, chronic AFib on Xarelto, peripheral arterial disease, COPD, HTN, and a large left testicle hydrocele who presents to the ER complaining of what he thought was rectal bleeding as he was noting blood in the toilet and running down his legs.  Ultimately was found that the bleeding was coming from the posterior aspect of his scrotum from a superficial scrotal ulceration.  He also complained of persistent testicular swelling and pain.Doppler ultrasound was significantly limited, but could not rule out the presence of torsion.Urology was consulted and they made decision to proceed with emergent scrotal exploration.  Patient underwent surgery, seemed to tolerate well.  In the PACU, patient was persistently hypotensive with blood pressures staying approximately 80/50 despite fluid resuscitation. Upgraded to ICU for vasopressor support.Went into cardiac arrest with ventricular tachycardia requiring defibrillation x3 (03/18/2024).Remains on Antibiotics.Now off of pressors.Being downgraded to floors.FOBT pos. GI planned for EGD. [facility] planning for Ischemic evaluation. Consultants cleared for anticoagulants. Now  on FD lovenox    Interval Hx:   No acute overnight events, underwent EGD, found to have grade B reflux esophagitis, chronic gastritis, chronic duodenitis.  May need outpatient colonoscopy, recommended Carafate for 7 days commenting dicyclomine or hyoscyamine q.6 p.r.n. for abdominal pain, Protonix 40 IV b.i.d. while inpatient, transition to p.o. for a month on discharge and then daily for 3 months.  Cardiology following.  Urology following.  Continues on antibiotics    Patient was seen and examined at bedside, has some mild discomfort around scrotum, denies any  fevers or chills, denies any chest pain or difficulty breathing .    Chart was reviewed, afebrile, hemodynamically stable, most recent labs reviewed hemoglobin-8.5, no leukocytosis    Case was discussed with patient's nurse     Objective/physical exam:  General: In no acute distress, afebrile  Chest:  Rhonchi.  On nasal cannula  Heart:  +S1, S2  Abdomen: Soft, nontender, BS +  MSK: Warm, no lower extremity edema, no clubbing or cyanosis  :  Scrotal wound with dressing  Neurologic: Alert and oriented x4, able to move all extremities    VITAL SIGNS: 24 HRS MIN & MAX LAST   Temp  Min: 97.5 °F (36.4 °C)  Max: 99.2 °F (37.3 °C) 98 °F (36.7 °C)   BP  Min: 93/56  Max: 153/83 107/69   Pulse  Min: 48  Max: 84  62   Resp  Min: 15  Max: 28 (!) 24   SpO2  Min: 83 %  Max: 100 % 99 %     I have reviewed the following labs:  Recent Labs   Lab 03/21/24  0220 03/22/24  0120 03/23/24  0122   WBC 9.75 8.83 8.72   RBC 2.80* 2.79* 2.75*   HGB 8.6* 8.4* 8.5*   HCT 27.2* 26.3* 26.2*   MCV 97.1* 94.3* 95.3*   MCH 30.7 30.1 30.9   MCHC 31.6* 31.9* 32.4*   RDW 16.0 16.0 16.8    241 339   MPV 11.1* 10.8* 10.7*     Recent Labs   Lab 03/19/24  0540 03/19/24  1742 03/20/24  0539 03/20/24  1939 03/21/24  0219 03/22/24  0120 03/23/24  0122   NA  --    < >  --   --  140 143 143   K  --    < >  --   --  3.4* 3.2* 4.0   CO2  --    < >  --   --  29 26 25   BUN  --    < >  --   --  10.6 7.2* 11.6   CREATININE  --    < >  --   --  0.79 0.83 1.08   CALCIUM  --    < >  --   --  7.7* 7.9* 8.2*   PH 7.440  --  7.510* 7.500*  --   --   --    MG  --    < >  --   --  2.20 2.10 2.40   ALBUMIN  --    < >  --   --  2.1* 2.0* 2.2*   ALKPHOS  --    < >  --   --  64 59 64   ALT  --    < >  --   --  11 11 9   AST  --    < >  --   --  21 20 21   BILITOT  --    < >  --   --  0.5 0.4 0.4    < > = values in this interval not displayed.     Microbiology Results (last 7 days)       Procedure Component Value Units Date/Time    Blood Culture [[id number]]  (Normal)  Collected: 03/18/24 2226    Order Status: Completed Specimen: Blood, Venous Updated: 03/22/24 2300     CULTURE, BLOOD ([facility]) No Growth At 96 Hours    Blood culture #2 **CANNOT BE ORDERED STAT** [[id number]]  (Normal) Collected: 03/17/24 2103    Order Status: Completed Specimen: Blood Updated: 03/22/24 2200     CULTURE, BLOOD ([facility]) No Growth at 5 days    Blood culture #1 **CANNOT BE ORDERED STAT** [[id number]]  (Normal) Collected: 03/17/24 2103    Order Status: Completed Specimen: Blood Updated: 03/22/24 2200     CULTURE, BLOOD ([facility]) No Growth at 5 days    Respiratory Culture [[id number]]  (Abnormal) Collected: 03/20/24 0940    Order Status: Completed Specimen: Sputum, Expectorated Updated: 03/22/24 0912     Respiratory Culture Moderate Yeast     Comment: with no normal respiratory jt        GRAM STAIN Quality 2+      No bacteria seen    Wound Culture [[id number]]  (Abnormal)  (Susceptibility) Collected: 03/18/24 1502    Order Status: Completed Specimen: Abscess from Scrotum Updated: 03/21/24 1306     Wound Culture Many Escherichia coli      Many Streptococcus agalactiae (Group B)    Anaerobic Culture [[id number]] Collected: 03/18/24 1502    Order Status: Completed Specimen: Abscess from Scrotum Updated: 03/21/24 0941     Anaerobe Culture No Anaerobes Isolated    Urine culture [[id number]]  (Abnormal) Collected: 03/18/24 0034    Order Status: Completed Specimen: Urine Updated: 03/19/24 1004     Urine Culture No other significant growth      10,000 - 25,000 colonies/ml Yeast species     Comment: We have not further evaluated these organisms because three or more species compatible with normal genital jt usually represents specimen contamination from the time of collection, rather than infection. If clinical circumstances warrant further   workup of these organisms, please contact the Microbiology dept at [phone]; otherwise please have the patient submit another specimen.       Chlamydia/GC, PCR [[id number]]  Collected: 03/19/24 0128    Order Status: Completed Specimen: Urine Updated: 03/19/24 0527     Chlamydia trachomatis PCR Not Detected     N. gonorrhea PCR Not Detected     Source Urine    Narrative:      The Xpert CT/NG test, performed on the GeneXpert system is a qualitative in vitro real-time polymerase chain reaction (PCR) test for the automated detected and differentiation for genomic DNA from Chlamydia trachomatis (CT) and/or Neisseria gonorrhoeae (NG).    STD Urine (CT/GC/Trich) [[id number]] Collected: 03/19/24 0128    Order Status: Canceled Specimen: Urine Updated: 03/19/24 0138             See below for Radiology    Scheduled Med:   amiodarone  400 mg Oral BID    Followed by    [START ON 3/25/2024] amiodarone  200 mg Oral BID    Followed by    [START ON 3/31/2024] amiodarone  200 mg Oral Daily    atorvastatin  80 mg Oral Daily    chlorhexidine  15 mL Mouth/Throat BID    enoxparin  1 mg/kg Subcutaneous Q12H (prophylaxis, 0900/2100)    folic acid  1 mg Oral Daily    levalbuterol  1.25 mg Nebulization Q6H    LIDOcaine (PF) 10 mg/ml (1%)  1 mL Intradermal Once    midodrine  10 mg Oral TID WM    multivitamin  1 tablet Oral Daily    mupirocin   Nasal BID    pantoprazole  40 mg Intravenous BID    piperacillin-tazobactam (Zosyn) IV (PEDS and ADULTS) (extended infusion is not appropriate)  4.5 g Intravenous Q8H    scopolamine  1 patch Transdermal Q3 Days    sucralfate  1 g Oral QID (AC & HS)    thiamine (B-1) 500 mg in dextrose 5 % (D5W) 100 mL IVPB  500 mg Intravenous TID      Continuous Infusions:   sodium chloride 0.9% 10 mL/hr at 03/22/24 1358      PRN Meds:  acetaminophen, dextrose 10 % in water (D10W), dextrose 10 % in water (D10W), dextrose 10%, dextrose 10%, insulin aspart U-100, ipratropium, LORazepam, melatonin, morphine, oxyCODONE, sodium chloride 0.9%, sodium chloride 0.9%     Assessment/Plan:  Scrotal abscess s/p I&D with left joselito orchiectomy 03/18/2024  UTI  Abnormal Chest Imaging with Rt lower lobe  Infiltrate  Human Metapneumovirus +ve  Sepsis from above requiring vasopressor supoort  Acute hypoxic respiratory failure  Recent cardiac arrest with ventricular tachycardia requiring defibrillation x3 (03/18/2024)  AFib RVR requiring amiodarone drip  NSTMI, unspecified  Partial bowel obstruction  Normocytic anemia.  FOBT positive  Electrolyte derangements   Abnormal CTA-abdominal aortic aneurysmal dilatation  with mural thrombus     Plan   Urology on board, cleared for anticoagulation, continue dressing changes, Continue the current antibiotics-on Vancomycin and Zosyn.  Monitor fever curve and WBC.  On Vancomycin 3/17 and Zosyn 3/17.  Reviewed microbiology data.  Blood cultures so far negative.  We will follow up with Urology if recommending any specific duration on antimicrobials  Monitor BP, urine output off of vasopressors.  Holding antihypertensives. If pressures drop, may need vasopressor support back again.  Wean oxygen  Cardiology following, Monitor on telemetry, monitor and replete electrolytes, on amiodarone.  May need ischemic evaluation once acute issues stabilize , likely next week.  On Lovenox full dose b.i.d.  GI following, underwent endoscopy, found gastritis, duodenitis, reflux esophagitis.  Okay for anticoagulation.  May need outpatient colonoscopy, recommended Carafate for 7 days commenting dicyclomine or hyoscyamine q.6 p.r.n. for abdominal pain, Protonix 40 IV b.i.d. while inpatient, transition to p.o. for a month on discharge and then daily for 3 months.  Monitor H&H  Speech therapy following  PTOT -moderate intensity  Continue supportive care.      Critical care Time Spent>35 min  Sepsis, Aib RVR, acute hypoxic respiratory failure    VTE prophylaxis:  Lovenox full dose      Anticipated discharge and Disposition:   To be decided, pending ischemic evaluation by Cardiology, urology clearance, would need placement. Needs outpatient colonoscopy      All diagnosis and differential diagnosis have  been reviewed; assessment and plan has been documented; I have personally reviewed the labs and test results that are presently available; I have reviewed the patients medication list; I have reviewed the consulting providers response and recommendations. I have reviewed or attempted to review medical records based upon their availability    All of the patient's questions have been  addressed and answered. Patient's is agreeable to the above stated plan. I will continue to monitor closely and make adjustments to medical management as needed.  _____________________________________________________________________    Nutrition Status:    Radiology:  I have personally reviewed the following imaging and agree with the radiologist.     XR Gastric tube check, non-radiologist performed  EXAMINATION:  XR GASTRIC TUBE CHECK, NON-RADIOLOGIST PERFORMED    CLINICAL HISTORY:  Correct Placement Check;    TECHNIQUE:  AP View(s) of the abdomen was performed.    COMPARISON:  03/19/2024    FINDINGS:  Enteric tube terminates in the stomach.  Side port is at the GE junction.  Suggest advancing approximately 7 cm for more optimal positioning.    Electronically signed by: Sailaja Atkinson  Date:    03/20/2024  Time:    17:03  X-Ray Chest 1 View  EXAMINATION  XR CHEST 1 VIEW    CLINICAL HISTORY  Intubation;    TECHNIQUE  A total of 1 frontal image(s) of the chest.    COMPARISON  19 March 2024    FINDINGS  Lines/tubes/devices: Grossly unchanged positioning when allowing for differences in technique and patient rotation.    The cardiac silhouette and central vascular structures are unchanged.  The trachea is midline. Hazy ill-defined infiltrate is more pronounced at the right lower lung zone.  Left lung field remains relatively clear.  There is no enlarging pleural effusion or convincing pneumothorax.    Regional osseous structures and extrathoracic soft tissues are similar.    IMPRESSION  1. Mildly worsened ill-defined right lower lung  infiltrate.  2. Remaining findings similar.    Electronically signed by: Ramana Mcleod  Date:    03/20/2024  Time:    07:45      Natali Seymour MD  Department of Hospital Medicine   [facility]   03/23/2024

## 2024-03-23 NOTE — PROGRESS NOTES
"  [facility]    Cardiology  Progress Note    Patient Name: Ran Reyes Jr.  MRN: [id number]  Admission Date: 3/17/2024  Hospital Length of Stay: 5 days  Code Status: Full Code   Attending Physician: Natali Seymour MD   Primary Care Physician: Shiela, Primary Doctor  Expected Discharge Date:   Principal Problem:Scrotal hematoma    Subjective:   Chief Complaint/Reason for Consult: OAC recs     HPI: Ran Reyes Jr. Is a 68 year old male, known to Dr. Wagner, with PMH of  cardiomyopathy, systolic heart failure with an EF of 41%, VHD, chronic AFib on Xarelto, peripheral arterial disease, COPD, HTN, and a large left testicle hydrocele who presented to the ED 3/18/24 for scrotal bleeding with testicular swelling and pain. Found to have sepsis likely due to UTI, acute bronchitis. Also found in ED to be in Afib RVR on EKG, bp 90s/60s. BNP 1273, troponin 0.045 --> 0.055. Patient admits to dyspnea, cough, and wheezing. Denies chest pains. Cardiology being consulted for OAC recommendations.    Hospital Course:  3.19.24: Intubated/Mechanical Ventilation. AF SVR. On Vasopressor Support. Family at bedside.   3.20.24: NAD. Vented/Sedated. PAF/CVR. Dobutamine 2.5mcg/kg/min. Amiodarone 0.5mg/min.   3.21.24: NAD. Extubated. "I am ok." PAF/CVR. Levophed 0.01mcg/kg/min. Oral Amiodarone.   3.22.24: NAD. "I am feeling better." PAF/SVR but Mostly CVR. Off Pressors.   3.23.24: NAD. "I am good." PAF/SVR - Mostly CVR. No Profound Bradycardia since Yesterday. Remains off Pressors.      PMH: Cardiomyopathy, systolic heart failure with an EF of 41%, VHD, chronic AFib on Xarelto, peripheral arterial disease, COPD, HTN, CAD (Details Unclear)  PSH: cardiac stent >10 years ago, L FA atherectomy and angioplasty, R SFA stent, cataract extraction,   Family History: Father MI at age 66.   Social History: 10+ pack year hx current smoker, social EtOH, denies drugs.     Previous Cardiac Diagnostics:   Echocardiogram (3.19.24):  Left " Ventricle: The left ventricle is normal in size. Increased wall thickness. Unable to assess wall motion. There is moderately reduced systolic function with a visually estimated ejection fraction of 30 - 40%.  Right Ventricle: Mild right ventricular enlargement.  Left Atrium: Left atrium is moderately dilated.  Right Atrium: Right atrium is severely dilated.  Mitral Valve: There is moderate regurgitation.  Tricuspid Valve: There is mild to moderate regurgitation.  Pulmonary Artery: Pulmonary artery pressure could not be estimated.  IVC/SVC: Patient is ventilated, cannot use IVC diameter to estimate right atrial pressure.    Echocardiogram (1.31.24):   Left Ventricle: The left ventricle is normal in size. Mildly increased wall thickness. There is reduced systolic function. Biplane (2D) method of discs ejection fraction is 41%. Unable to assess diastolic function due to atrial fibrillation.  Right Ventricle: Normal right ventricular cavity size. Systolic function is mildly reduced.  Left Atrium: Left atrium is severely dilated.  Right Atrium: Right atrium is severely dilated.  Aortic Valve: The aortic valve is a trileaflet valve.  Mitral Valve: There is bileaflet sclerosis. There is moderate to severe regurgitation.  Tricuspid Valve: There is mild regurgitation.  IVC/SVC: Normal venous pressure at 3 mmHg.    Carotid US (2.7.23):  The study quality is average.   1-39% stenosis in the proximal right internal carotid artery based on Bluth Criteria. Antegrade right vertebral artery flow.   1-39% stenosis in the proximal left internal carotid artery based on Bluth Criteria. Antegrade left vertebral artery flow.     Echocardiogram (11.8.22):  The study quality is average.   The left ventricle is moderately enlarged. Left ventricular diastolic dimension is 6.4 cms. Global left ventricular systolic function is moderately decreased. The left ventricular ejection fraction is 40%. Arrhythmia noted throughout much of the exam.    There is no evidence of mitral stenosis or prolapse appreciated. MV Ortiz score ~ 5. The area by planimetry is 5.3 cm². The mean trans mitral gradient is 1.6 mmHg. Suspect borderline severe (4+) mitral regurgitation with a posteriorly directed jet. MR PISA radius ~ 0.93 cm, MR ERO ~ 0.35 cm^2, MR regurgitant volume ~ 72 ml/beat, MR regurgitant fraction ~ 55%, MR vena contracta ~ 0.54 cm.  Mild calcification of the aortic valve is noted with adequate cuspal excursion.   Trace pulmonic regurgitation. Mild (1+) tricuspid regurgitation.    Peripheral Angiogram (10.17.22):  Underwent Successful CSI Atherectomy Balloon Angioplasty and Stenting of the Right SFA and CSI Atherectomy Balloon Angioplasty of Right Anterior Tibial Artery Using Pedal Approach.    Peripheral Angiogram (9.12.22):  Underwent Successful Left SFA Laser Atherectomy Balloon Angioplasty Using Left Radial Artery Approach.    Review of Systems   Constitutional: Positive for malaise/fatigue. Negative for chills and fever.   Cardiovascular:  Negative for chest pain and leg swelling.   Respiratory:  Negative for shortness of breath.    Skin:         Scrotal Wound   All other systems reviewed and are negative.    Objective:     Vital Signs (Most Recent):  Temp: 98.2 °F (36.8 °C) (03/23/24 1200)  Pulse: 67 (03/23/24 1154)  Resp: 19 (03/23/24 1154)  BP: (!) 145/117 (03/23/24 1133)  SpO2: 98 % (03/23/24 1154) Vital Signs (24h Range):  Temp:  [98 °F (36.7 °C)-98.7 °F (37.1 °C)] 98.2 °F (36.8 °C)  Pulse:  [53-84] 67  Resp:  [15-28] 19  SpO2:  [83 %-100 %] 98 %  BP: ()/() 145/117   Weight: 89.8 kg (198 lb)  Body mass index is 27.62 kg/m².  SpO2: 98 %       Intake/Output Summary (Last 24 hours) at 3/23/2024 1356  Last data filed at 3/23/2024 1200  Gross per 24 hour   Intake 840 ml   Output 1175 ml   Net -335 ml       Lines/Drains/Airways       Drain  Duration                  Urethral Catheter 03/18/24 1426 Straight-tip 16 Fr. 4 days               Peripheral Intravenous Line  Duration                  Peripheral IV - Single Lumen 03/18/24 1321 20 G Left;Posterior Hand 5 days         Peripheral IV - Single Lumen 03/18/24 1930 20 G Distal;Posterior;Right Forearm 4 days         Peripheral IV - Single Lumen 03/18/24 2300 20 G Distal;Left;Posterior Forearm 4 days                  Significant Labs:   Recent Results (from the past 72 hour(s))   RT Blood Gas    Collection Time: 03/20/24  7:39 PM   Result Value Ref Range    Sample Type Arterial Blood     Sample site Right Radial Artery     Drawn by RCL CRT     pH, Blood gas 7.500 (H) 7.350 - 7.450    pCO2, Blood gas 45.0 35.0 - 45.0 mmHg    pO2, Blood gas 52.0 (LL) 80.0 - 100.0 mmHg    Sodium, Blood Gas 137 137 - 145 mmol/L    Potassium, Blood Gas 3.1 (L) 3.5 - 5.0 mmol/L    Calcium Level Ionized 1.08 (L) 1.12 - 1.23 mmol/L    TOC2, Blood gas 36.5 mmol/L    Base Excess, Blood gas 10.60 mmol/L    sO2, Blood gas 90.0 %    HCO3, Blood gas 35.1 (H) 22.0 - 26.0 mmol/L    Allens Test Yes     Oxygen Device, Blood gas Cannula     LPM 6     FIO2, Blood gas 44 %   Triglycerides    Collection Time: 03/21/24  2:19 AM   Result Value Ref Range    Triglyceride 104 34 - 140 mg/dL   Comprehensive metabolic panel    Collection Time: 03/21/24  2:19 AM   Result Value Ref Range    Sodium Level 140 136 - 145 mmol/L    Potassium Level 3.4 (L) 3.5 - 5.1 mmol/L    Chloride 101 98 - 107 mmol/L    Carbon Dioxide 29 23 - 31 mmol/L    Glucose Level 104 82 - 115 mg/dL    Blood Urea Nitrogen 10.6 8.4 - 25.7 mg/dL    Creatinine 0.79 0.73 - 1.18 mg/dL    Calcium Level Total 7.7 (L) 8.8 - 10.0 mg/dL    Protein Total 5.0 (L) 5.8 - 7.6 gm/dL    Albumin Level 2.1 (L) 3.4 - 4.8 g/dL    Globulin 2.9 2.4 - 3.5 gm/dL    Albumin/Globulin Ratio 0.7 (L) 1.1 - 2.0 ratio    Bilirubin Total 0.5 <=1.5 mg/dL    Alkaline Phosphatase 64 40 - 150 unit/L    Alanine Aminotransferase 11 0 - 55 unit/L    Aspartate Aminotransferase 21 5 - 34 unit/L    eGFR >60  mls/min/1.73/m2   Magnesium    Collection Time: 03/21/24  2:19 AM   Result Value Ref Range    Magnesium Level 2.20 1.60 - 2.60 mg/dL   Phosphorus    Collection Time: 03/21/24  2:19 AM   Result Value Ref Range    Phosphorus Level 1.7 (L) 2.3 - 4.7 mg/dL   CBC with Differential    Collection Time: 03/21/24  2:20 AM   Result Value Ref Range    WBC 9.75 4.50 - 11.50 x10(3)/mcL    RBC 2.80 (L) 4.70 - 6.10 x10(6)/mcL    Hgb 8.6 (L) 14.0 - 18.0 g/dL    Hct 27.2 (L) 42.0 - 52.0 %    MCV 97.1 (H) 80.0 - 94.0 fL    MCH 30.7 27.0 - 31.0 pg    MCHC 31.6 (L) 33.0 - 36.0 g/dL    RDW 16.0 11.5 - 17.0 %    Platelet 199 130 - 400 x10(3)/mcL    MPV 11.1 (H) 7.4 - 10.4 fL    Neut % 72.6 %    Lymph % 13.8 %    Mono % 8.6 %    Eos % 0.3 %    Basophil % 0.7 %    Lymph # 1.35 0.6 - 4.6 x10(3)/mcL    Neut # 7.07 2.1 - 9.2 x10(3)/mcL    Mono # 0.84 0.1 - 1.3 x10(3)/mcL    Eos # 0.03 0 - 0.9 x10(3)/mcL    Baso # 0.07 <=0.2 x10(3)/mcL    IG# 0.39 (H) 0 - 0.04 x10(3)/mcL    IG% 4.0 %    NRBC% 0.2 %   Occult Blood, Stool 1st Specimen    Collection Time: 03/21/24  9:39 AM   Result Value Ref Range    Stool Color 1 Green     Stool Consistancy 1 Mucoid     Occult Blood Stool 1 Positive (A) Negative   VANCOMYCIN, TROUGH    Collection Time: 03/21/24  9:51 AM   Result Value Ref Range    Vancomycin Trough 24.3 (H) 15.0 - 20.0 ug/ml   Phosphorus    Collection Time: 03/22/24  1:20 AM   Result Value Ref Range    Phosphorus Level 3.2 2.3 - 4.7 mg/dL   Magnesium    Collection Time: 03/22/24  1:20 AM   Result Value Ref Range    Magnesium Level 2.10 1.60 - 2.60 mg/dL   Comprehensive metabolic panel    Collection Time: 03/22/24  1:20 AM   Result Value Ref Range    Sodium Level 143 136 - 145 mmol/L    Potassium Level 3.2 (L) 3.5 - 5.1 mmol/L    Chloride 106 98 - 107 mmol/L    Carbon Dioxide 26 23 - 31 mmol/L    Glucose Level 108 82 - 115 mg/dL    Blood Urea Nitrogen 7.2 (L) 8.4 - 25.7 mg/dL    Creatinine 0.83 0.73 - 1.18 mg/dL    Calcium Level Total 7.9 (L) 8.8  - 10.0 mg/dL    Protein Total 5.5 (L) 5.8 - 7.6 gm/dL    Albumin Level 2.0 (L) 3.4 - 4.8 g/dL    Globulin 3.5 2.4 - 3.5 gm/dL    Albumin/Globulin Ratio 0.6 (L) 1.1 - 2.0 ratio    Bilirubin Total 0.4 <=1.5 mg/dL    Alkaline Phosphatase 59 40 - 150 unit/L    Alanine Aminotransferase 11 0 - 55 unit/L    Aspartate Aminotransferase 20 5 - 34 unit/L    eGFR >60 mls/min/1.73/m2   Triglycerides    Collection Time: 03/22/24  1:20 AM   Result Value Ref Range    Triglyceride 69 34 - 140 mg/dL   CBC with Differential    Collection Time: 03/22/24  1:20 AM   Result Value Ref Range    WBC 8.83 4.50 - 11.50 x10(3)/mcL    RBC 2.79 (L) 4.70 - 6.10 x10(6)/mcL    Hgb 8.4 (L) 14.0 - 18.0 g/dL    Hct 26.3 (L) 42.0 - 52.0 %    MCV 94.3 (H) 80.0 - 94.0 fL    MCH 30.1 27.0 - 31.0 pg    MCHC 31.9 (L) 33.0 - 36.0 g/dL    RDW 16.0 11.5 - 17.0 %    Platelet 241 130 - 400 x10(3)/mcL    MPV 10.8 (H) 7.4 - 10.4 fL    Neut % 70.5 %    Lymph % 14.0 %    Mono % 10.3 %    Eos % 0.5 %    Basophil % 0.5 %    Lymph # 1.24 0.6 - 4.6 x10(3)/mcL    Neut # 6.23 2.1 - 9.2 x10(3)/mcL    Mono # 0.91 0.1 - 1.3 x10(3)/mcL    Eos # 0.04 0 - 0.9 x10(3)/mcL    Baso # 0.04 <=0.2 x10(3)/mcL    IG# 0.37 (H) 0 - 0.04 x10(3)/mcL    IG% 4.2 %    NRBC% 0.5 %   Vancomycin, Random    Collection Time: 03/22/24  9:07 AM   Result Value Ref Range    Vanc Lvl Random 19.0 15.0 - 20.0 ug/ml   POCT glucose    Collection Time: 03/22/24  9:06 PM   Result Value Ref Range    POCT Glucose 117 (H) 70 - 110 mg/dL   Phosphorus    Collection Time: 03/23/24  1:22 AM   Result Value Ref Range    Phosphorus Level 2.9 2.3 - 4.7 mg/dL   Magnesium    Collection Time: 03/23/24  1:22 AM   Result Value Ref Range    Magnesium Level 2.40 1.60 - 2.60 mg/dL   Comprehensive metabolic panel    Collection Time: 03/23/24  1:22 AM   Result Value Ref Range    Sodium Level 143 136 - 145 mmol/L    Potassium Level 4.0 3.5 - 5.1 mmol/L    Chloride 106 98 - 107 mmol/L    Carbon Dioxide 25 23 - 31 mmol/L    Glucose  Level 121 (H) 82 - 115 mg/dL    Blood Urea Nitrogen 11.6 8.4 - 25.7 mg/dL    Creatinine 1.08 0.73 - 1.18 mg/dL    Calcium Level Total 8.2 (L) 8.8 - 10.0 mg/dL    Protein Total 6.1 5.8 - 7.6 gm/dL    Albumin Level 2.2 (L) 3.4 - 4.8 g/dL    Globulin 3.9 (H) 2.4 - 3.5 gm/dL    Albumin/Globulin Ratio 0.6 (L) 1.1 - 2.0 ratio    Bilirubin Total 0.4 <=1.5 mg/dL    Alkaline Phosphatase 64 40 - 150 unit/L    Alanine Aminotransferase 9 0 - 55 unit/L    Aspartate Aminotransferase 21 5 - 34 unit/L    eGFR >60 mls/min/1.73/m2   Triglycerides    Collection Time: 03/23/24  1:22 AM   Result Value Ref Range    Triglyceride 61 34 - 140 mg/dL   CBC with Differential    Collection Time: 03/23/24  1:22 AM   Result Value Ref Range    WBC 8.72 4.50 - 11.50 x10(3)/mcL    RBC 2.75 (L) 4.70 - 6.10 x10(6)/mcL    Hgb 8.5 (L) 14.0 - 18.0 g/dL    Hct 26.2 (L) 42.0 - 52.0 %    MCV 95.3 (H) 80.0 - 94.0 fL    MCH 30.9 27.0 - 31.0 pg    MCHC 32.4 (L) 33.0 - 36.0 g/dL    RDW 16.8 11.5 - 17.0 %    Platelet 339 130 - 400 x10(3)/mcL    MPV 10.7 (H) 7.4 - 10.4 fL    Neut % 67.5 %    Lymph % 16.2 %    Mono % 10.4 %    Eos % 0.9 %    Basophil % 0.5 %    Lymph # 1.41 0.6 - 4.6 x10(3)/mcL    Neut # 5.89 2.1 - 9.2 x10(3)/mcL    Mono # 0.91 0.1 - 1.3 x10(3)/mcL    Eos # 0.08 0 - 0.9 x10(3)/mcL    Baso # 0.04 <=0.2 x10(3)/mcL    IG# 0.39 (H) 0 - 0.04 x10(3)/mcL    IG% 4.5 %    NRBC% 0.5 %     Telemetry: PAF/CVR    Physical Exam  Vitals reviewed.   Constitutional:       General: He is not in acute distress.     Appearance: Normal appearance. He is ill-appearing.   HENT:      Mouth/Throat:      Mouth: Mucous membranes are moist.   Eyes:      Conjunctiva/sclera: Conjunctivae normal.   Cardiovascular:      Rate and Rhythm: Normal rate. Rhythm irregular.      Heart sounds: Murmur heard.   Pulmonary:      Effort: Pulmonary effort is normal. No respiratory distress.      Breath sounds: Rhonchi present.      Comments: NC O2  Genitourinary:     Comments: Scrotal Sling  in Place, Dressing C/D/I  Musculoskeletal:         General: No swelling.      Right lower leg: No edema.      Left lower leg: No edema.   Neurological:      General: No focal deficit present.      Mental Status: He is alert and oriented to person, place, and time.   Psychiatric:         Mood and Affect: Mood normal.         Behavior: Behavior normal.       Current Inpatient Medications:    Current Facility-Administered Medications:     0.9%  NaCl infusion, , Intravenous, Continuous, aMxi Jordan, DO, Last Rate: 10 mL/hr at 03/22/24 1358, New Bag at 03/22/24 1358    acetaminophen tablet 650 mg, 650 mg, Oral, Q8H PRN, Harris Hernandez MD    amiodarone tablet 400 mg, 400 mg, Oral, BID, 400 mg at 03/21/24 2002 **FOLLOWED BY** [START ON 3/25/2024] amiodarone tablet 200 mg, 200 mg, Oral, BID **FOLLOWED BY** [START ON 3/31/2024] amiodarone tablet 200 mg, 200 mg, Oral, Daily, Joshua Hernandez ANP    atorvastatin tablet 80 mg, 80 mg, Oral, Daily, Cassidy Obrien MD, 80 mg at 03/23/24 0833    chlorhexidine 0.12 % solution 15 mL, 15 mL, Mouth/Throat, BID, David Gonsalez MD, 15 mL at 03/23/24 0833    dextrose 10 % infusion, , Intravenous, PRN, David Gonsalez MD    dextrose 10 % infusion, , Intravenous, PRN, David Gonsalez MD    dextrose 10% bolus 125 mL 125 mL, 12.5 g, Intravenous, PRN, David Gonsalez MD    dextrose 10% bolus 250 mL 250 mL, 25 g, Intravenous, PRN, David Gonsalez MD    enoxaparin injection 90 mg, 1 mg/kg, Subcutaneous, Q12H (prophylaxis, 0900/2100), Jenna Miller MD, 90 mg at 03/23/24 0833    folic acid tablet 1 mg, 1 mg, Oral, Daily, Jenna Miller MD, 1 mg at 03/23/24 0833    insulin aspart U-100 injection 0-10 Units, 0-10 Units, Subcutaneous, Q6H PRN, Narendra Pradhan, DO    ipratropium 0.02 % nebulizer solution 0.5 mg, 0.5 mg, Nebulization, Q6H PRN, David Gonsalez MD    levalbuterol nebulizer solution 1.25 mg, 1.25 mg, Nebulization, Q6H, Narendra Pradhan, , 1.25 mg at  03/23/24 0752    LIDOcaine (PF) 10 mg/ml (1%) injection 10 mg, 1 mL, Intradermal, Once, Maxi Jordan DO    LORazepam tablet 2 mg, 2 mg, Oral, Q4H PRN, Jenna Miller MD, 2 mg at 03/21/24 0031    melatonin tablet 6 mg, 6 mg, Oral, Nightly PRN, Harris Hernandez MD    midodrine tablet 10 mg, 10 mg, Oral, TID WM, Narendra Pradhan, , 10 mg at 03/23/24 1209    morphine injection 2 mg, 2 mg, Intravenous, Q6H PRN, David Gonsalez MD, 2 mg at 03/22/24 1657    multivitamin tablet, 1 tablet, Oral, Daily, Jenna Miller MD, 1 tablet at 03/23/24 0833    mupirocin 2 % ointment, , Nasal, BID, Cassidy Obrien MD, Given at 03/23/24 0833    oxyCODONE immediate release tablet 5 mg, 5 mg, Oral, Q4H PRN, Harris Hernandez MD, 5 mg at 03/23/24 0653    pantoprazole injection 40 mg, 40 mg, Intravenous, BID, Cassidy Obrien MD, 40 mg at 03/23/24 0833    piperacillin-tazobactam (ZOSYN) 4.5 g in dextrose 5 % in water (D5W) 100 mL IVPB (MB+), 4.5 g, Intravenous, Q8H, Harris Hernandez MD, Stopped at 03/23/24 1233    scopolamine 1.3-1.5 mg (1 mg over 3 days) 1 patch, 1 patch, Transdermal, Q3 Days, Joe Clarke MD, 1 patch at 03/20/24 1424    sodium chloride 0.9% flush 10 mL, 10 mL, Intravenous, PRN, Narendra Pradhan DO    sodium chloride 0.9% flush 10 mL, 10 mL, Intravenous, PRN, David Gonsalez MD    sucralfate tablet 1 g, 1 g, Oral, QID (AC & HS), Jenna Miller MD, 1 g at 03/23/24 1209    thiamine (B-1) 500 mg in dextrose 5 % (D5W) 100 mL IVPB, 500 mg, Intravenous, TID, David Gonsalez MD, Stopped at 03/23/24 0902    Facility-Administered Medications Ordered in Other Encounters:     0.9%  NaCl infusion, , Intravenous, Continuous, Shannon Milligan MD, Last Rate: 10 mL/hr at 03/09/23 1020, New Bag at 03/09/23 1020    diphenhydrAMINE injection 25 mg, 25 mg, Intravenous, Once PRN, Shannon Milligan MD    LIDOcaine (PF) 10 mg/ml (1%) injection 10 mg, 1 mL, Intradermal, Once, Lanette Ulloa  S, FNP    LIDOcaine (PF) 10 mg/ml (1%) injection 10 mg, 1 mL, Intradermal, Once, Shannon Milligan MD    ondansetron injection 4 mg, 4 mg, Intravenous, Once, Shannon Milligan MD    prochlorperazine injection Soln 5 mg, 5 mg, Intravenous, Once PRN, Shannon Milligan MD  VTE Risk Mitigation (From admission, onward)           Ordered     enoxaparin injection 90 mg  Every 12 hours         03/22/24 1647     IP VTE LOW RISK PATIENT  Once         03/19/24 0422     Place sequential compression device  Until discontinued         03/18/24 0124                  Assessment:   Cardiac Arrest/VT (Post Operative Left Héctor Orchiectomy - in PACU 3.18.24)     - Requiring Multiple Defibrillations (x 3)    - On Amiodarone Infusion  Chronic Atrial Fibrillation- Now AF CVR (HR 50's-60s with Intermittent PVCS)    - Xarelto on Hold Post Scrotal Abscess I&D  NSTEMI (Unspecified for Now)  Acute Hypoxemic Respiratory Failure requiring Intubation/Ventilation - Now Extubated on NC O2  Hypotension Requiring Vasopressor Support - Resolved     - History of Hypertension  Valvular Heart Disease    - MR: Moderate, TR: Mild to Moderate  Hyperlipidemia    - On Statin  Sepsis - Likely UTI Related   Scrotal Abscess    - Status Post Surgical Exploration, Left Orchiectomy, & Debridement of Wound/Wound Packing (3.18.24)   COPD  Partial Bowel Obstruction   Long Term Oral Anticoagulation  Cardiomyopathy (Unspecified)    - EF 30-40%  CAD (Details Unclear)  PAD  Infrarenal Abdominal Aortic Aneurysmal Dilatation with Mural Thrombus (Stable)    - Maximum diameter is 3.6 cm without significant interval change   Anemia with Stool + for OCB    - EGD (3.22.24) - LA Grade B Reflux Esophagitis with No Bleeding, Chronic Gastritis, Protonix; Colonoscopy as OP 2/2 Scrotal Wound  Acute Human Metapneumovirus Infection (On Droplet Precautions)    Plan:   Continue Oral Amiodarone Load - No Further Episodes of VT  GI Consulted - EGD as per Above   Keep K > 4.0 and Mg >  2.0   Antibiotic Management as per Primary Team  Consider Ischemic Evaluation once acute medical issues stabilize, and cleared for anticoagulation by GI Team (s/p EGD - Awaiting GI Recommendations)  Will Continue to Follow  Labs in AM: CBC, CMP and Mg    RAN Kim  Cardiology  [facility]  03/23/2024     I agree with the findings of the complexity of problems addressed and take responsibility for the plan's risks and complications. I approved the plan documented by Joshua Hernandez NP.  Pt being loaded with amiodarone

## 2024-03-24 LAB
ABO + RH BLD: NORMAL
ALBUMIN SERPL-MCNC: 2.3 G/DL (ref 3.4–4.8)
ALBUMIN/GLOB SERPL: 0.7 RATIO (ref 1.1–2)
ALP SERPL-CCNC: 64 UNIT/L (ref 40–150)
ALT SERPL-CCNC: 10 UNIT/L (ref 0–55)
AST SERPL-CCNC: 19 UNIT/L (ref 5–34)
BASOPHILS # BLD AUTO: 0.03 X10(3)/MCL
BASOPHILS NFR BLD AUTO: 0.4 %
BILIRUB SERPL-MCNC: 0.5 MG/DL
BLD PROD TYP BPU: NORMAL
BLOOD UNIT EXPIRATION DATE: NORMAL
BLOOD UNIT TYPE CODE: 6200
BUN SERPL-MCNC: 16.7 MG/DL (ref 8.4–25.7)
CALCIUM SERPL-MCNC: 8.1 MG/DL (ref 8.8–10)
CHLORIDE SERPL-SCNC: 105 MMOL/L (ref 98–107)
CO2 SERPL-SCNC: 26 MMOL/L (ref 23–31)
CREAT SERPL-MCNC: 1.07 MG/DL (ref 0.73–1.18)
CROSSMATCH INTERPRETATION: NORMAL
DISPENSE STATUS: NORMAL
EOSINOPHIL # BLD AUTO: 0.08 X10(3)/MCL (ref 0–0.9)
EOSINOPHIL NFR BLD AUTO: 0.9 %
ERYTHROCYTE [DISTWIDTH] IN BLOOD BY AUTOMATED COUNT: 16.7 % (ref 11.5–17)
GFR SERPLBLD CREATININE-BSD FMLA CKD-EPI: >60 MLS/MIN/1.73/M2
GLOBULIN SER-MCNC: 3.1 GM/DL (ref 2.4–3.5)
GLUCOSE SERPL-MCNC: 99 MG/DL (ref 82–115)
GROUP & RH: NORMAL
HCT VFR BLD AUTO: 23.3 % (ref 42–52)
HCT VFR BLD AUTO: 25.1 % (ref 42–52)
HGB BLD-MCNC: 7.3 G/DL (ref 14–18)
HGB BLD-MCNC: 8 G/DL (ref 14–18)
IMM GRANULOCYTES # BLD AUTO: 0.26 X10(3)/MCL (ref 0–0.04)
IMM GRANULOCYTES NFR BLD AUTO: 3.1 %
INDIRECT COOMBS: NORMAL
LYMPHOCYTES # BLD AUTO: 1.48 X10(3)/MCL (ref 0.6–4.6)
LYMPHOCYTES NFR BLD AUTO: 17.5 %
MAGNESIUM SERPL-MCNC: 2.1 MG/DL (ref 1.6–2.6)
MCH RBC QN AUTO: 30.3 PG (ref 27–31)
MCHC RBC AUTO-ENTMCNC: 31.3 G/DL (ref 33–36)
MCV RBC AUTO: 96.7 FL (ref 80–94)
MONOCYTES # BLD AUTO: 0.68 X10(3)/MCL (ref 0.1–1.3)
MONOCYTES NFR BLD AUTO: 8.1 %
NEUTROPHILS # BLD AUTO: 5.91 X10(3)/MCL (ref 2.1–9.2)
NEUTROPHILS NFR BLD AUTO: 70 %
NRBC BLD AUTO-RTO: 0.2 %
PHOSPHATE SERPL-MCNC: 2.7 MG/DL (ref 2.3–4.7)
PLATELET # BLD AUTO: 378 X10(3)/MCL (ref 130–400)
PMV BLD AUTO: 9.9 FL (ref 7.4–10.4)
POTASSIUM SERPL-SCNC: 4 MMOL/L (ref 3.5–5.1)
PROT SERPL-MCNC: 5.4 GM/DL (ref 5.8–7.6)
RBC # BLD AUTO: 2.41 X10(6)/MCL (ref 4.7–6.1)
SODIUM SERPL-SCNC: 142 MMOL/L (ref 136–145)
SPECIMEN OUTDATE: NORMAL
TRIGL SERPL-MCNC: 58 MG/DL (ref 34–140)
UNIT NUMBER: NORMAL
WBC # SPEC AUTO: 8.44 X10(3)/MCL (ref 4.5–11.5)

## 2024-03-24 PROCEDURE — 30233N1 TRANSFUSION OF NONAUTOLOGOUS RED BLOOD CELLS INTO PERIPHERAL VEIN, PERCUTANEOUS APPROACH: ICD-10-PCS | Performed by: INTERNAL MEDICINE

## 2024-03-24 PROCEDURE — 86923 COMPATIBILITY TEST ELECTRIC: CPT | Performed by: NURSE PRACTITIONER

## 2024-03-24 PROCEDURE — 83735 ASSAY OF MAGNESIUM: CPT

## 2024-03-24 PROCEDURE — 63600175 PHARM REV CODE 636 W HCPCS: Performed by: INTERNAL MEDICINE

## 2024-03-24 PROCEDURE — 84100 ASSAY OF PHOSPHORUS: CPT

## 2024-03-24 PROCEDURE — 25000003 PHARM REV CODE 250: Performed by: INTERNAL MEDICINE

## 2024-03-24 PROCEDURE — 80053 COMPREHEN METABOLIC PANEL: CPT

## 2024-03-24 PROCEDURE — 94760 N-INVAS EAR/PLS OXIMETRY 1: CPT

## 2024-03-24 PROCEDURE — 25000003 PHARM REV CODE 250: Performed by: STUDENT IN AN ORGANIZED HEALTH CARE EDUCATION/TRAINING PROGRAM

## 2024-03-24 PROCEDURE — 86850 RBC ANTIBODY SCREEN: CPT | Performed by: NURSE PRACTITIONER

## 2024-03-24 PROCEDURE — 27000207 HC ISOLATION

## 2024-03-24 PROCEDURE — 11000001 HC ACUTE MED/SURG PRIVATE ROOM

## 2024-03-24 PROCEDURE — 85018 HEMOGLOBIN: CPT | Performed by: INTERNAL MEDICINE

## 2024-03-24 PROCEDURE — 25000003 PHARM REV CODE 250

## 2024-03-24 PROCEDURE — 99900035 HC TECH TIME PER 15 MIN (STAT)

## 2024-03-24 PROCEDURE — 25000242 PHARM REV CODE 250 ALT 637 W/ HCPCS: Performed by: STUDENT IN AN ORGANIZED HEALTH CARE EDUCATION/TRAINING PROGRAM

## 2024-03-24 PROCEDURE — P9016 RBC LEUKOCYTES REDUCED: HCPCS | Performed by: NURSE PRACTITIONER

## 2024-03-24 PROCEDURE — 25000242 PHARM REV CODE 250 ALT 637 W/ HCPCS: Performed by: INTERNAL MEDICINE

## 2024-03-24 PROCEDURE — 63600175 PHARM REV CODE 636 W HCPCS: Mod: JZ,JG

## 2024-03-24 PROCEDURE — C9113 INJ PANTOPRAZOLE SODIUM, VIA: HCPCS | Performed by: INTERNAL MEDICINE

## 2024-03-24 PROCEDURE — 84478 ASSAY OF TRIGLYCERIDES: CPT

## 2024-03-24 PROCEDURE — 85025 COMPLETE CBC W/AUTO DIFF WBC: CPT

## 2024-03-24 PROCEDURE — 94640 AIRWAY INHALATION TREATMENT: CPT

## 2024-03-24 PROCEDURE — 36430 TRANSFUSION BLD/BLD COMPNT: CPT

## 2024-03-24 PROCEDURE — 27000221 HC OXYGEN, UP TO 24 HOURS

## 2024-03-24 PROCEDURE — 99900031 HC PATIENT EDUCATION (STAT)

## 2024-03-24 RX ORDER — FUROSEMIDE 10 MG/ML
10 INJECTION INTRAMUSCULAR; INTRAVENOUS ONCE
Status: COMPLETED | OUTPATIENT
Start: 2024-03-24 | End: 2024-03-24

## 2024-03-24 RX ORDER — IPRATROPIUM BROMIDE AND ALBUTEROL SULFATE 2.5; .5 MG/3ML; MG/3ML
3 SOLUTION RESPIRATORY (INHALATION) EVERY 4 HOURS
Status: DISCONTINUED | OUTPATIENT
Start: 2024-03-24 | End: 2024-04-12 | Stop reason: HOSPADM

## 2024-03-24 RX ORDER — ENOXAPARIN SODIUM 100 MG/ML
1 INJECTION SUBCUTANEOUS EVERY 12 HOURS
Status: DISCONTINUED | OUTPATIENT
Start: 2024-03-24 | End: 2024-04-01

## 2024-03-24 RX ORDER — IPRATROPIUM BROMIDE AND ALBUTEROL SULFATE 2.5; .5 MG/3ML; MG/3ML
3 SOLUTION RESPIRATORY (INHALATION) ONCE
Status: DISCONTINUED | OUTPATIENT
Start: 2024-03-24 | End: 2024-03-29

## 2024-03-24 RX ORDER — HYDROCODONE BITARTRATE AND ACETAMINOPHEN 500; 5 MG/1; MG/1
TABLET ORAL
Status: DISCONTINUED | OUTPATIENT
Start: 2024-03-24 | End: 2024-04-12 | Stop reason: HOSPADM

## 2024-03-24 RX ADMIN — IPRATROPIUM BROMIDE AND ALBUTEROL SULFATE 3 ML: 2.5; .5 SOLUTION RESPIRATORY (INHALATION) at 07:03

## 2024-03-24 RX ADMIN — MUPIROCIN: 20 OINTMENT TOPICAL at 08:03

## 2024-03-24 RX ADMIN — PANTOPRAZOLE SODIUM 40 MG: 40 INJECTION, POWDER, FOR SOLUTION INTRAVENOUS at 09:03

## 2024-03-24 RX ADMIN — Medication 6 MG: at 10:03

## 2024-03-24 RX ADMIN — PIPERACILLIN AND TAZOBACTAM 4.5 G: 4; .5 INJECTION, POWDER, LYOPHILIZED, FOR SOLUTION INTRAVENOUS; PARENTERAL at 09:03

## 2024-03-24 RX ADMIN — LEVALBUTEROL HYDROCHLORIDE 1.25 MG: 1.25 SOLUTION RESPIRATORY (INHALATION) at 02:03

## 2024-03-24 RX ADMIN — SUCRALFATE 1 G: 1 TABLET ORAL at 08:03

## 2024-03-24 RX ADMIN — PANTOPRAZOLE SODIUM 40 MG: 40 INJECTION, POWDER, FOR SOLUTION INTRAVENOUS at 08:03

## 2024-03-24 RX ADMIN — FUROSEMIDE 10 MG: 10 INJECTION, SOLUTION INTRAMUSCULAR; INTRAVENOUS at 07:03

## 2024-03-24 RX ADMIN — PIPERACILLIN AND TAZOBACTAM 4.5 G: 4; .5 INJECTION, POWDER, LYOPHILIZED, FOR SOLUTION INTRAVENOUS; PARENTERAL at 05:03

## 2024-03-24 RX ADMIN — OXYCODONE HYDROCHLORIDE 5 MG: 5 TABLET ORAL at 12:03

## 2024-03-24 RX ADMIN — CHLORHEXIDINE GLUCONATE 0.12% ORAL RINSE 15 ML: 1.2 LIQUID ORAL at 08:03

## 2024-03-24 RX ADMIN — MORPHINE SULFATE 2 MG: 4 INJECTION, SOLUTION INTRAMUSCULAR; INTRAVENOUS at 04:03

## 2024-03-24 RX ADMIN — ENOXAPARIN SODIUM 90 MG: 100 INJECTION SUBCUTANEOUS at 09:03

## 2024-03-24 RX ADMIN — THIAMINE HYDROCHLORIDE 500 MG: 100 INJECTION, SOLUTION INTRAMUSCULAR; INTRAVENOUS at 08:03

## 2024-03-24 RX ADMIN — OXYCODONE HYDROCHLORIDE 5 MG: 5 TABLET ORAL at 09:03

## 2024-03-24 RX ADMIN — FOLIC ACID 1 MG: 1 TABLET ORAL at 09:03

## 2024-03-24 RX ADMIN — MUPIROCIN: 20 OINTMENT TOPICAL at 09:03

## 2024-03-24 RX ADMIN — MIDODRINE HYDROCHLORIDE 10 MG: 5 TABLET ORAL at 07:03

## 2024-03-24 RX ADMIN — SUCRALFATE 1 G: 1 TABLET ORAL at 06:03

## 2024-03-24 RX ADMIN — THIAMINE HYDROCHLORIDE 500 MG: 100 INJECTION, SOLUTION INTRAMUSCULAR; INTRAVENOUS at 09:03

## 2024-03-24 RX ADMIN — ATORVASTATIN CALCIUM 80 MG: 40 TABLET, FILM COATED ORAL at 09:03

## 2024-03-24 RX ADMIN — CHLORHEXIDINE GLUCONATE 0.12% ORAL RINSE 15 ML: 1.2 LIQUID ORAL at 09:03

## 2024-03-24 RX ADMIN — MIDODRINE HYDROCHLORIDE 10 MG: 5 TABLET ORAL at 11:03

## 2024-03-24 RX ADMIN — THERA TABS 1 TABLET: TAB at 09:03

## 2024-03-24 RX ADMIN — SUCRALFATE 1 G: 1 TABLET ORAL at 11:03

## 2024-03-24 RX ADMIN — SUCRALFATE 1 G: 1 TABLET ORAL at 03:03

## 2024-03-24 RX ADMIN — MIDODRINE HYDROCHLORIDE 10 MG: 5 TABLET ORAL at 05:03

## 2024-03-24 RX ADMIN — OXYCODONE HYDROCHLORIDE 5 MG: 5 TABLET ORAL at 08:03

## 2024-03-24 RX ADMIN — THIAMINE HYDROCHLORIDE 500 MG: 100 INJECTION, SOLUTION INTRAMUSCULAR; INTRAVENOUS at 03:03

## 2024-03-24 RX ADMIN — LEVALBUTEROL HYDROCHLORIDE 1.25 MG: 1.25 SOLUTION RESPIRATORY (INHALATION) at 07:03

## 2024-03-24 RX ADMIN — PIPERACILLIN AND TAZOBACTAM 4.5 G: 4; .5 INJECTION, POWDER, LYOPHILIZED, FOR SOLUTION INTRAVENOUS; PARENTERAL at 12:03

## 2024-03-24 NOTE — PROGRESS NOTES
Ran Reyes Jr.   MRN: [id number]   ADMISSION DATE: 3/17/2024  : 1955  AGE: 68 y.o.    DATE :  2024     PROVIDER: WALDO RAZA       SUBJECTIVE:  Patient awake and alert.  Reported to have some shortness of breath.  No overt GI bleed.      Review of Systems   Awake and alert.  Reported some shortness of breath.  No overt GI bleed.  Full cardiac workup tomorrow.  No abdominal pain.  No fever or chills.    Review of patient's allergies indicates:  No Known Allergies      albuterol-ipratropium  3 mL Nebulization Once    atorvastatin  80 mg Oral Daily    chlorhexidine  15 mL Mouth/Throat BID    enoxparin  1 mg/kg Subcutaneous Q12H (prophylaxis, 0900/)    folic acid  1 mg Oral Daily    levalbuterol  1.25 mg Nebulization Q6H    LIDOcaine (PF) 10 mg/ml (1%)  1 mL Intradermal Once    midodrine  10 mg Oral TID WM    multivitamin  1 tablet Oral Daily    mupirocin   Nasal BID    pantoprazole  40 mg Intravenous BID    piperacillin-tazobactam (Zosyn) IV (PEDS and ADULTS) (extended infusion is not appropriate)  4.5 g Intravenous Q8H    scopolamine  1 patch Transdermal Q3 Days    sucralfate  1 g Oral QID (AC & HS)    thiamine (B-1) 500 mg in dextrose 5 % (D5W) 100 mL IVPB  500 mg Intravenous TID       Medications Discontinued During This Encounter   Medication Reason    ADVAIR HFA 45-21 mcg/actuation HFAA inhaler     ipratropium (ATROVENT HFA) 17 mcg/actuation inhaler     metoprolol succinate (TOPROL-XL) 25 MG 24 hr tablet     nicotine polacrilex 4 MG Lozg     aspirin (ECOTRIN) 81 MG EC tablet     nicotine (NICODERM CQ) 7 mg/24 hr     torsemide (DEMADEX) 10 MG Tab     moxifloxacin (VIGAMOX) 0.5 % ophthalmic solution     flurbiprofen (OCUFEN) 0.03 % ophthalmic solution     prednisoLONE acetate (PRED FORTE) 1 % DrpS     levoFLOXacin (LEVAQUIN) 500 MG tablet     HYDROcodone-acetaminophen (NORCO) 5-325 mg per tablet     fluticasone propionate (FLONASE) 50 mcg/actuation nasal spray     tadalafiL (CIALIS) 20 MG Tab      diltiaZEM injection 10 mg     spironolactone tablet 25 mg     sacubitriL-valsartan 49-51 mg per tablet 1 tablet     PHENYLephrine 20 mg in sodium chloride 0.9% 250 mL infusion     metoprolol injection 5 mg     sodium chloride 0.9% flush 10 mL     morphine injection 4 mg     acetaminophen tablet 650 mg     HYDROmorphone (PF) injection 0.4 mg     lactated ringers bolus 500 mL     enoxaparin injection 40 mg     ondansetron injection 4 mg Patient Transfer    NORepinephrine 8 mg in dextrose 5% 250 mL infusion     dexmedetomidine (PRECEDEX) 400mcg/100mL 0.9% NaCL infusion     amiodarone 360 mg/200 mL (1.8 mg/mL) infusion     oxyCODONE-acetaminophen 5-325 mg per tablet 2 tablet     propofol (DIPRIVAN) 10 mg/mL infusion     hydrOXYzine tablet 50 mg     atorvastatin tablet 20 mg     lactated ringers infusion     albuterol-ipratropium 2.5 mg-0.5 mg/3 mL nebulizer solution 3 mL     albuterol-ipratropium 2.5 mg-0.5 mg/3 mL nebulizer solution 3 mL     0.9%  NaCl infusion     potassium bicarbonate disintegrating tablet 50 mEq     lactated ringers infusion     insulin detemir U-100 injection 10 Units     dextrose 10% bolus 125 mL 125 mL     dextrose 10% bolus 250 mL 250 mL     midazolam IV bolus from bag/infusion 1 mg     fentaNYL 2500 mcg in 0.9% sodium chloride 250 mL infusion premix (titrating)     fenofibrate tablet 145 mg     vancomycin in dextrose 5 % 1 gram/250 mL IVPB 1,000 mg     insulin regular in 0.9 % NaCl 100 unit/100 mL (1 unit/mL) infusion     amiodarone 360 mg/200 mL (1.8 mg/mL) infusion     dextrose 5 % in lactated ringers infusion     HYDROmorphone injection 0.5 mg     HYDROmorphone injection 0.2 mg     amiodarone 360 mg/200 mL (1.8 mg/mL) infusion     levalbuterol nebulizer solution 0.63 mg     propofol (DIPRIVAN) 10 mg/mL infusion     DOBUtamine 1000 mg in D5W 250 mL infusion     midazolam (PF) in 0.9 % NaCl 1 mg/mL infusion     fentanyl IV bolus from bag/infusion 50 mcg     LORazepam injection 2 mg      dextrose 5 % and 0.45 % NaCl infusion     midazolam (VERSED) 1 mg/mL injection 2 mg     levalbuterol nebulizer solution 1.25 mg     midodrine tablet 2.5 mg     furosemide injection 40 mg     enoxaparin injection 40 mg     vancomycin 1.25 g in dextrose 5% 250 mL IVPB (ready to mix)     famotidine (PF) injection 20 mg     enoxaparin injection 90 mg     magnesium sulfate 2g in water 50mL IVPB (premix)     atorvastatin tablet 80 mg     mupirocin 2 % ointment     vancomycin - pharmacy to dose     dexmedetomidine (PRECEDEX) 400mcg/100mL 0.9% NaCL infusion     NORepinephrine 8 mg in dextrose 5% 250 mL infusion     enoxaparin injection 40 mg     enoxaparin injection 90 mg     amiodarone tablet 400 mg     amiodarone tablet 200 mg     amiodarone tablet 200 mg              Past Medical History:   Diagnosis Date    A-fib     Anticoagulant long-term use     Aortic aneurysm     Cataract     CHF (congestive heart failure)     Chronic atrial fibrillation     COPD (chronic obstructive pulmonary disease)     Coronary artery disease     HLD (hyperlipidemia)     Hypertension     Mitral regurgitation     PAD (peripheral artery disease)     Primary open angle glaucoma (POAG)       Social History     Socioeconomic History    Marital status: Single   Tobacco Use    Smoking status: Every Day     Current packs/day: 0.25     Average packs/day: 0.3 packs/day for 40.0 years (10.0 ttl pk-yrs)     Types: Cigarettes     Passive exposure: Current    Smokeless tobacco: Never   Substance and Sexual Activity    Alcohol use: Yes     Alcohol/week: 3.0 standard drinks of alcohol     Types: 3 Cans of beer per week     Comment: socially    Drug use: Yes     Frequency: 1.0 times per week     Types: Marijuana     Comment: no use in over 2 months     Social Determinants of Health     Financial Resource Strain: Patient Unable To Answer (3/19/2024)    Overall Financial Resource Strain (CARDIA)     Difficulty of Paying Living Expenses: Patient unable to answer    Food Insecurity: Patient Unable To Answer (3/19/2024)    Hunger Vital Sign     Worried About Running Out of Food in the Last Year: Patient unable to answer     Ran Out of Food in the Last Year: Patient unable to answer   Transportation Needs: Patient Unable To Answer (3/19/2024)    PRAPARE - Transportation     Lack of Transportation (Medical): Patient unable to answer     Lack of Transportation (Non-Medical): Patient unable to answer   Physical Activity: Inactive (11/30/2022)    Exercise Vital Sign     Days of Exercise per Week: 0 days     Minutes of Exercise per Session: 0 min   Stress: Patient Unable To Answer (3/19/2024)    [place] [employer] - Occupational Stress Questionnaire     Feeling of Stress : Patient unable to answer   Social Connections: Socially Isolated (11/30/2022)    Social Connection and Isolation Panel [NHANES]     Frequency of Communication with Friends and Family: Never     Frequency of Social Gatherings with Friends and Family: Never     Attends Christian Services: More than 4 times per year     Active Member of Clubs or Organizations: No     Attends Club or Organization Meetings: Never     Marital Status: Never    Housing Stability: Patient Unable To Answer (3/19/2024)    Housing Stability Vital Sign     Unable to Pay for Housing in the Last Year: Patient unable to answer     Unstable Housing in the Last Year: Patient unable to answer      Past Surgical History:   Procedure Laterality Date    ATHERECTOMY OF PERIPHERAL VESSEL Left 09/12/2022    LEFT SFA ATHERECTOMY, BALLOON ANGIOPLASTY    CATARACT EXTRACTION W/  INTRAOCULAR LENS IMPLANT Left 3/9/2023    Procedure: EXTRACTION, CATARACT, WITH IOL INSERTION;  Surgeon: Georgi Borja MD;  Location: [facility];  Service: Ophthalmology;  Laterality: Left;  19.5  mac    Heart Stent N/A     > 10yrs. AGO    INCISION AND DRAINAGE N/A 3/18/2024    Procedure: Incision and Drainage;  Surgeon: Fahad Rivas MD;  Location:  [facility] OR;  Service: Urology;  Laterality: N/A;  I&D SCROTAL ABSCESS    INSERTION OF STENT INTO PERIPHERAL VESSEL Right 10/17/2022    RIGHT SFA ATHERECTOMY, BALLOON ANGIOPLASTY, STENT; RIGHT ANTERIOR TIBIAL ARTERY ATHERECTOMY, BALLOON ANGIOPLASTY    ORCHIECTOMY Left 3/18/2024    Procedure: ORCHIECTOMY;  Surgeon: Fahad Rivas MD;  Location: [facility] OR;  Service: Urology;  Laterality: Left;        OBJECTIVE:     Vitals:    03/24/24 1409 03/24/24 1500 03/24/24 1600 03/24/24 1601   BP:  111/71     Pulse: 70 72 65    Resp: 18 (!) 30 (!) 30 16   Temp:   97.9 °F (36.6 °C)    TempSrc:       SpO2: 96% (!) 94% (!) 94% 95%   Weight:       Height:           Physical Exam       LABS    Recent Labs   Lab 03/22/24  0120 03/23/24  0122 03/24/24  0153 03/24/24  1357   WBC 8.83 8.72 8.44  --    HGB 8.4* 8.5* 7.3* 8.0*   HCT 26.3* 26.2* 23.3* 25.1*    339 378  --       Recent Labs   Lab 03/22/24  0120 03/23/24  0122 03/24/24  0153    143 142   K 3.2* 4.0 4.0   CO2 26 25 26   BUN 7.2* 11.6 16.7   CREATININE 0.83 1.08 1.07   CALCIUM 7.9* 8.2* 8.1*   BILITOT 0.4 0.4 0.5   ALKPHOS 59 64 64   ALT 11 9 10   AST 20 21 19   GLUCOSE 108 121* 99      Recent Labs   Lab 03/17/24 2103   INR 1.6*      Recent Labs   Lab 03/19/24  0020   AMYLASE 35      Recent Labs   Lab 03/17/24 2103   INR 1.6*   PTT 32.4           RESULTS: X-Ray Chest 1 View    Result Date: 3/24/2024  EXAMINATION: XR CHEST 1 VIEW CLINICAL HISTORY: hypoxia; TECHNIQUE: AP chest COMPARISON: Chest x-ray dated 03/20/2024 FINDINGS: Endotracheal tube, enteric tube and right-sided central line have been removed.  There is stable cardiomegaly.  There is improved aeration of the lungs.  There is no pleural effusion or visible pneumothorax.     Interval removal of lines and tubes.  No acute abnormality of the chest. Electronically signed by: Danni Johnson Date:    03/24/2024 Time:    12:56    XR Gastric tube check, non-radiologist performed    Result Date:  3/20/2024  EXAMINATION: XR GASTRIC TUBE CHECK, NON-RADIOLOGIST PERFORMED CLINICAL HISTORY: Correct Placement Check; TECHNIQUE: AP View(s) of the abdomen was performed. COMPARISON: 03/19/2024 FINDINGS: Enteric tube terminates in the stomach.  Side port is at the GE junction.  Suggest advancing approximately 7 cm for more optimal positioning. Electronically signed by: Sailaja Atkinson Date:    03/20/2024 Time:    17:03    X-Ray Chest 1 View    Result Date: 3/20/2024  EXAMINATION XR CHEST 1 VIEW CLINICAL HISTORY Intubation; TECHNIQUE A total of 1 frontal image(s) of the chest. COMPARISON 19 March 2024 FINDINGS Lines/tubes/devices: Grossly unchanged positioning when allowing for differences in technique and patient rotation. The cardiac silhouette and central vascular structures are unchanged.  The trachea is midline. Hazy ill-defined infiltrate is more pronounced at the right lower lung zone.  Left lung field remains relatively clear.  There is no enlarging pleural effusion or convincing pneumothorax. Regional osseous structures and extrathoracic soft tissues are similar. IMPRESSION 1. Mildly worsened ill-defined right lower lung infiltrate. 2. Remaining findings similar. Electronically signed by: Ramana Mcleod Date:    03/20/2024 Time:    07:45    X-Ray Abdomen Flat And Erect    Result Date: 3/19/2024  EXAMINATION: XR ABDOMEN FLAT AND ERECT CLINICAL HISTORY: r/o toxic megacolon; TECHNIQUE: Supine and upright views of the abdomen performed. COMPARISON: 03/18/2024 FINDINGS: Enteric tube terminates at the stomach.  Persistent gaseous distension the bowel, overall improved compared to previous. No evidence of free intraperitoneal air. No appreciable acute osseous abnormality.     Persistent though improved gaseous distension of the bowel. Electronically signed by: Sailaja Atkinson Date:    03/19/2024 Time:    12:08    Echo Saline Bubble? Yes    Result Date: 3/19/2024    Left Ventricle: The left ventricle is normal in size.  Increased wall thickness. Unable to assess wall motion. There is moderately reduced systolic function with a visually estimated ejection fraction of 30 - 40%.   Right Ventricle: Mild right ventricular enlargement.   Left Atrium: Left atrium is moderately dilated.   Right Atrium: Right atrium is severely dilated.   Mitral Valve: There is moderate regurgitation.   Tricuspid Valve: There is mild to moderate regurgitation.   Pulmonary Artery: Pulmonary artery pressure could not be estimated.   IVC/SVC: Patient is ventilated, cannot use IVC diameter to estimate right atrial pressure.     CT Abdomen Pelvis With IV Contrast NO Oral Contrast    Result Date: 3/19/2024  EXAMINATION: CT ABDOMEN PELVIS WITH IV CONTRAST CLINICAL HISTORY: Abdominal abscess/infection suspected; TECHNIQUE: CT imaging was performed of the abdomen and pelvis after the administration of intravenous contrast. Dose length product is 1032 mGycm. Automatic exposure control, adjustment of mA/kV or iterative reconstruction technique was used to limit radiation dose. COMPARISON: CT abdomen pelvis dated 03/17/2024 FINDINGS: Liver: Normal. Gallbladder and biliary tree: No calcified gallstones. No intra or extrahepatic biliary ductal dilation. Pancreas: Normal. Spleen: Normal. Adrenals: Normal. Kidneys and ureters: Normal. Bladder: The urinary catheter is in place. Reproductive organs: There is partially visualized left scrotal wall thickening, with air visualized in the inguinal canal, likely postoperative. Stomach/bowel: A nasogastric tube has its tip in the stomach.  No bowel obstruction. Lymph nodes: No pathologically enlarged lymph node identified. Peritoneum: Trace ascites. Vessels: Moderate scattered vascular calcifications. Abdominal wall: Normal. Lung bases: Small bilateral pleural effusions with basilar atelectasis.  The heart is enlarged. Bones: No acute osseous findings.     1. Small pleural effusions with basilar atelectasis. 2. Trace ascites. 3.  Postoperative changes of the scrotum with left scrotal wall thickening. Electronically signed by: Danni Johnson Date:    03/19/2024 Time:    08:02    X-Ray Chest 1 View    Result Date: 3/19/2024  EXAMINATION: XR CHEST 1 VIEW CPT 53961 CLINICAL HISTORY: central line placement confirmation; COMPARISON: March 18, 2024 FINDINGS: Examination reveals cardiomediastinal silhouette and pleuroparenchymal changes to be essentially unchanged as compared with the previous exam. Interval insertion of central line from a right approach tip projecting over the region of the superior vena cava     No significant change. Central line insertion Electronically signed by: Lito Hurst Date:    03/19/2024 Time:    05:35    XR Gastric tube check, non-radiologist performed    Result Date: 3/18/2024  EXAMINATION: XR GASTRIC TUBE CHECK, NON-RADIOLOGIST PERFORMED CLINICAL HISTORY: NG placement; TECHNIQUE: One view COMPARISON: March 18, 2024 FINDINGS: Nasogastric tube traverses the gastroesophageal junction and tip of tube is within the left upper quadrant abdomen in the expected location of gastric fundus.  There is adequate length of the tube within the stomach.     Nasogastric tube terminates within the gastric fundus. Electronically signed by: Mickey West Date:    03/18/2024 Time:    23:20    X-Ray Chest 1 View    Result Date: 3/18/2024  EXAMINATION: XR CHEST 1 VIEW CLINICAL HISTORY: ETT confirmation; TECHNIQUE: One view COMPARISON: March 17, 2024. FINDINGS: Optimal intubation and endotracheal tube tip is about the medial clavicular heads.  Nasogastric tube traverses into the stomach.  Cardiopericardial silhouette enlarged appearance similar. No dense focal or segmental consolidation, congestive process, pleural effusions or pneumothorax.     Optimal intubation. Electronically signed by: Mickey West Date:    03/18/2024 Time:    22:23    XR Gastric tube check, non-radiologist performed    Result Date: 3/18/2024  EXAMINATION: XR  GASTRIC TUBE CHECK, NON-RADIOLOGIST PERFORMED CLINICAL HISTORY: NG placement confirmation; TECHNIQUE: One view FINDINGS: Nasogastric tube traverses the gastroesophageal junction and loops within the gastric fundus.  Tip of the tube is again within the gastric fundus.     Nasogastric tube terminates within the gastric fundus. Electronically signed by: Mickey West Date:    03/18/2024 Time:    22:22    US SCROTUM AND TESTICLES WITH DOPPLER (XPD)    Result Date: 3/18/2024  FINDINGS: Scan is limited due to patient's pain level not allowing him to tolerate the scan.  There is pronounced edematous thickening of the superficial soft tissues of the scrotum with a thickness of 1.7 cm.  The right and left testicles are not clearly separately visualize.  There is a single 12.4 x 6.6 x 8 cm hypoechoic structure in the midline (possibly representing testicle with no appreciable blood flow identified exact nature of these abnormality cannot be ascertained by this exam     Limited scan due to significant pain. Pronounce edematous thickening of the superficial soft tissues measure approximately 1.7 cm. Inability to  the right and left testicles with a single hypoechoic structure in the midline with measurements as above and not appreciable blood flow exact nature of these abnormality cannot be completely ascertained by this exam changes suggestive of testicular torsion are not completely excluded clinical correlation is suggested. This report was flagged in Epic as abnormal. Electronically signed by: Lito Hurst Date:    03/18/2024 Time:    06:44    X-Ray Chest 1 View    Result Date: 3/18/2024  EXAMINATION: XR CHEST 1 VIEW CPT 89169 CLINICAL HISTORY: chf; COMPARISON: October 23, 2023 FINDINGS: Examination reveals mediastinal silhouette to be within normal limits cardiac silhouette is enlarged lung fields reveal some increase interstitial markings with no focal consolidative changes atelectases effusions or  pneumothoraces     Cardiomegaly. Minimal increase interstitial markings Electronically signed by: Lito Hurst Date:    03/18/2024 Time:    05:18    CTA Abdomen and Pelvis    Result Date: 3/17/2024  EXAMINATION: CTA ABDOMEN AND PELVIS CLINICAL HISTORY: bleeding from rectum posterior scrotum, hx of AAA, severely enlarged scrotum; TECHNIQUE: Multidetector axial images were obtained of the abdomen and pelvis before and following the administration of IV contrast. Oral contrast was not administered. Dose length product of 968 mGycm. Automated exposure control was utilized to minimize radiation dose. COMPARISON: CT brain October 25, 2023. FINDINGS: There is trace of right dependent pleural effusion.  Visualized portion of the lungs are without acute consolidation or congestive process.  Cardiac chambers are enlarged in size. Hepatic volume and attenuation is unremarkable. Gallbladder wall is not thickened and there is no intra luminal calcified calculus. No biliary dilation identified. Pancreatic unremarkable attenuation without acute peripancreatic phlegmons. Main pancreatic duct is not dilated. Spleen is of normal size without focal lesion. Bilateral adrenal glands mild thickening suggest hyperplasia with similar appearance..  There is left kidney upper pole scarring. There is no hydronephrosis.  There are similar mild perinephric strandings. There is abdominal aortic aneurysmal dilatation with anterior aspect mural thrombus on image 114 series 15 where maximum diameter is 3.6 cm without significant interval change.  There are no signs of abdominal aortic dissection or periaortic inflammation or contrast extravasation.  Calcified plaques involve iliac arteries.  Flow is present bilaterally within the internal external iliac arteries to the femoral arteries.  There is no major vascular contrast extravasation. Stomach is mostly decompressed.  No abnormal dilatation of the loops of small bowel.  There is no focal or  generalized mural thickening of the small bowel loops.  There is moderate colonic fecal loading without abnormal dilatation or pericolonic acute strandings.  There is no active contrast extravasation within colon, especially the anorectum to suggest active gastrointestinal hemorrhage. There is large amount of hyperdense hemorrhagic fluid within the scrotum.  There is scrotal central aspect hypodensity with thin rim which measures 11.0 cm by 5.0 cm and may represent a hydrocele.  There is no active bleeding within the scrotum.  The cause for scrotal hemorrhage is not determined on this exam.  Testicles are not delineated as discrete structures.  Please further assess with ultrasound exam.  Urinary bladder is decompressed with slightly thickened wall.  There are multiple prostatic calcifications.  Bilateral inguinal fat containing hernias.  There are bilateral inguinal up to 1.2 cm enlarged lymph nodes. No acute or otherwise osseous abnormality identified.     1. Stable infrarenal abdominal aortic aneurysmal dilatation with mural thrombus.  No contrast extravasation from major abdominopelvic vasculature.. 2. No active gastrointestinal hemorrhage identified. 3. Large amount of hyperdense hemorrhagic fluid within the scrotum without active bleeding.  The source of this hemorrhage is not determined on this exam.  This may be secondary to scrotal vasculature.  Please further assess with ultrasound exam with Doppler. Electronically signed by: Mickey West Date:    03/17/2024 Time:    21:44      ASSESSMENT & PLAN:    68-year-old male unknown to our group with PMH of nonischemic cardiomyopathy (EF 41%), chronic AF on Xarelto, CAD, COPD, HTN, large hydrocele presented to ED 3/18 for what he thought was rectal bleeding, however source found to be posterior aspect of scrotum and pt was septic.  Doppler ultrasound could not rule out torsion so Urology decided to proceed with emergent scrotal exploration.  Patient became  hypotensive despite fluid resuscitation and PACU and was placed on pressor support and admitted to ICU.  Path report positive for wet gangrene.  On contact precautions for Human Metapneumovirus.  Stool dark but smears brown and noted as green by lab but FOBT positive.  HGB down trended from 10 to 8 during admission.  Successfully extubated on 03/20 and now weaned off of pressors and downgraded to floor awaiting bed.  GI consulted for FOBT positive stool.       Acute macrocytic anemia  Hgb 10.3--8.2--8.7--8.5--8.6--8.4  Baseline around 14 past year.  2.   FOBT + stool  Described as green by lab  No overt GIB  3.  Afib on Xarelto  4.  Scrotal abscess s/p I&D w/L joselito orchiectomy  Path + for wet gangrene     - Continue ppi  - Monitor H/H and transfuse as needed to Hgb 7  - Monitor stools for bleeding    3/24/24  Events noted.  No overt GI bleed.  EGD with Dr. Calderon with evidence of gastritis and duodenitis.  Biopsies were obtained.  Please refer to the operative report for details.  Okay to proceed with anticoagulation from GI standpoint as specified by Dr. Calderon on his EGD report dated 02/22/2024.  Patient was noted to have some downward drift of hemoglobin today.  Transfuse packed RBC with increased hemoglobin/hematocrit.  He also received some IV fluids yesterday.  There could be some fluid overload in the setting of underlying LV dysfunction.  Please call GI service for any questions.  Okay to proceed with anticoagulation as necessary.  Discussed with the nursing staff in details..

## 2024-03-24 NOTE — PROGRESS NOTES
Please refer to the following findings and impression from Dr. Calderon after upper endoscopy on 3/22/2024:    Impression:     - LA Grade B reflux esophagitis with no bleeding.                          - Chronic gastritis. Biopsied.                          - Chronic duodenitis.   Recommendation:        - [facility] to [facility] care.                          - Resume regular diet.                          - protonix 40mg iv bid while admitted then on dc                          start po for 1month and then daily thereafter for                          3 months                          carafate liquid suspension 1 gram po qid for 7 days                          dicyclomine 10mg or hyoscyamine 0.125mg po q 6hrs                          prn abdominal pain                          ok to start antithrombotics                          green stool in pt w scrotal abscess, will hold on                          colonoscopy for now. can schedule this as an outpt                          once scrotal infection clears, perhaps 3months                          will sign off now   (this report is available under chart review/procedure)     Above discussed with the ICU nursing staff.  Please call for any additional questions.

## 2024-03-24 NOTE — NURSING
Nurses Note -- 4 Eyes      3/24/2024   5:53 PM      Skin assessed during: Daily Assessment      [x] No Altered Skin Integrity Present    []Prevention Measures Documented      [] Yes- Altered Skin Integrity Present or Discovered   [] LDA Added if Not in Epic (Describe Wound)   [] New Altered Skin Integrity was Present on Admit and Documented in LDA   [] Wound Image Taken    Wound Care Consulted? No    Attending Nurse:  Carlos Hollis RN/Staff Member:   MAHSA Lopez

## 2024-03-24 NOTE — PROGRESS NOTES
[facility] Medicine Progress Note        Chief Complaint: Inpatient Follow-up for bleeding    HPI: Patient is a 68-year-old male with a history of nonischemic cardiomyopathy, systolic heart failure with an EF of 41%, VHD, chronic AFib on Xarelto, peripheral arterial disease, COPD, HTN, and a large left testicle hydrocele who presents to the ER complaining of what he thought was rectal bleeding as he was noting blood in the toilet and running down his legs.  Ultimately was found that the bleeding was coming from the posterior aspect of his scrotum from a superficial scrotal ulceration.  He also complained of persistent testicular swelling and pain.Doppler ultrasound was significantly limited, but could not rule out the presence of torsion.Urology was consulted and they made decision to proceed with emergent scrotal exploration.  Patient underwent surgery, seemed to tolerate well.  In the PACU, patient was persistently hypotensive with blood pressures staying approximately 80/50 despite fluid resuscitation. Upgraded to ICU for vasopressor support.Went into cardiac arrest with ventricular tachycardia requiring defibrillation x3 (03/18/2024).Remains on Antibiotics.Now off of pressors.Being downgraded to floors.FOBT pos. GI planned for EGD. [facility] planning for Ischemic evaluation. Consultants cleared for anticoagulants. Now  on FD lovenox.Underwent EGD, found to have grade B reflux esophagitis, chronic gastritis, chronic duodenitis.  May need outpatient colonoscopy, recommended Carafate for 7 days commenting dicyclomine or hyoscyamine q.6 p.r.n. for abdominal pain, Protonix 40 IV b.i.d. while inpatient, transition to p.o. for a month on discharge and then daily for 3 months    Interval Hx:   WAS INFORMED ABOUT THE DROP IN HEMOGLOBIN, AND GI WAS INFORMED ABOUT THIS, RECEIVING A UNIT OF BLOOD, COULD BE DILUTIONAL?, GI OKAY TO CONTINUE THE ANTICOAGULATION,  CONTINUED ON ANTIBIOTICS  OTHERWISE, UROLOGY FOLLOWING, CARDIOLOGY FOLLOWING, PLANNING FOR ISCHEMIC EVALUATION ONCE CLEARED BY GI.    Patient was seen and examined at bedside, has some mild discomfort around scrotum, denies any fevers or chills, denies any chest pain or difficulty breathing .    Chart was reviewed, afebrile, hemodynamically stable, most recent labs reviewed hemoglobin-7.3, no leukocytosis    Objective/physical exam:  General: In no acute distress, afebrile  Chest:  Rhonchi.  On nasal cannula  Heart:  +S1, S2  Abdomen: Soft, nontender, BS +  MSK: Warm, no lower extremity edema, no clubbing or cyanosis  :  Scrotal wound with dressing  Neurologic: Alert and oriented x4, able to move all extremities    VITAL SIGNS: 24 HRS MIN & MAX LAST   Temp  Min: 98 °F (36.7 °C)  Max: 98.6 °F (37 °C) 98 °F (36.7 °C)   BP  Min: 89/37  Max: 145/117 125/78   Pulse  Min: 56  Max: 156  82   Resp  Min: 15  Max: 31 15   SpO2  Min: 91 %  Max: 99 % 98 %     I have reviewed the following labs:  Recent Labs   Lab 03/22/24  0120 03/23/24  0122 03/24/24  0153   WBC 8.83 8.72 8.44   RBC 2.79* 2.75* 2.41*   HGB 8.4* 8.5* 7.3*   HCT 26.3* 26.2* 23.3*   MCV 94.3* 95.3* 96.7*   MCH 30.1 30.9 30.3   MCHC 31.9* 32.4* 31.3*   RDW 16.0 16.8 16.7    339 378   MPV 10.8* 10.7* 9.9     Recent Labs   Lab 03/19/24  0540 03/19/24  1742 03/20/24  0539 03/20/24  1939 03/21/24  0219 03/22/24  0120 03/23/24  0122 03/24/24  0153   NA  --    < >  --   --    < > 143 143 142   K  --    < >  --   --    < > 3.2* 4.0 4.0   CO2  --    < >  --   --    < > 26 25 26   BUN  --    < >  --   --    < > 7.2* 11.6 16.7   CREATININE  --    < >  --   --    < > 0.83 1.08 1.07   CALCIUM  --    < >  --   --    < > 7.9* 8.2* 8.1*   PH 7.440  --  7.510* 7.500*  --   --   --   --    MG  --    < >  --   --    < > 2.10 2.40 2.10   ALBUMIN  --    < >  --   --    < > 2.0* 2.2* 2.3*   ALKPHOS  --    < >  --   --    < > 59 64 64   ALT  --    < >  --   --    < > 11 9 10   AST  --    < >  --   --     < > 20 21 19   BILITOT  --    < >  --   --    < > 0.4 0.4 0.5    < > = values in this interval not displayed.     Microbiology Results (last 7 days)       Procedure Component Value Units Date/Time    Blood Culture [[id number]]  (Normal) Collected: 03/18/24 2226    Order Status: Completed Specimen: Blood, Venous Updated: 03/23/24 2300     CULTURE, BLOOD ([facility]) No Growth at 5 days    Blood culture #2 **CANNOT BE ORDERED STAT** [[id number]]  (Normal) Collected: 03/17/24 2103    Order Status: Completed Specimen: Blood Updated: 03/22/24 2200     CULTURE, BLOOD ([facility]) No Growth at 5 days    Blood culture #1 **CANNOT BE ORDERED STAT** [[id number]]  (Normal) Collected: 03/17/24 2103    Order Status: Completed Specimen: Blood Updated: 03/22/24 2200     CULTURE, BLOOD ([facility]) No Growth at 5 days    Respiratory Culture [[id number]]  (Abnormal) Collected: 03/20/24 0940    Order Status: Completed Specimen: Sputum, Expectorated Updated: 03/22/24 0912     Respiratory Culture Moderate Yeast     Comment: with no normal respiratory jt        GRAM STAIN Quality 2+      No bacteria seen    Wound Culture [[id number]]  (Abnormal)  (Susceptibility) Collected: 03/18/24 1502    Order Status: Completed Specimen: Abscess from Scrotum Updated: 03/21/24 1306     Wound Culture Many Escherichia coli      Many Streptococcus agalactiae (Group B)    Anaerobic Culture [[id number]] Collected: 03/18/24 1502    Order Status: Completed Specimen: Abscess from Scrotum Updated: 03/21/24 0941     Anaerobe Culture No Anaerobes Isolated    Urine culture [[id number]]  (Abnormal) Collected: 03/18/24 0034    Order Status: Completed Specimen: Urine Updated: 03/19/24 1004     Urine Culture No other significant growth      10,000 - 25,000 colonies/ml Yeast species     Comment: We have not further evaluated these organisms because three or more species compatible with normal genital jt usually represents specimen contamination from the time of collection, rather  than infection. If clinical circumstances warrant further   workup of these organisms, please contact the Microbiology dept at [phone]; otherwise please have the patient submit another specimen.       Chlamydia/GC, PCR [[id number]] Collected: 03/19/24 0128    Order Status: Completed Specimen: Urine Updated: 03/19/24 0527     Chlamydia trachomatis PCR Not Detected     N. gonorrhea PCR Not Detected     Source Urine    Narrative:      The Xpert CT/NG test, performed on the GeneXpert system is a qualitative in vitro real-time polymerase chain reaction (PCR) test for the automated detected and differentiation for genomic DNA from Chlamydia trachomatis (CT) and/or Neisseria gonorrhoeae (NG).    STD Urine (CT/GC/Trich) [[id number]] Collected: 03/19/24 0128    Order Status: Canceled Specimen: Urine Updated: 03/19/24 0138             See below for Radiology    Scheduled Med:   amiodarone  400 mg Oral BID    Followed by    [START ON 3/25/2024] amiodarone  200 mg Oral BID    Followed by    [START ON 3/31/2024] amiodarone  200 mg Oral Daily    atorvastatin  80 mg Oral Daily    chlorhexidine  15 mL Mouth/Throat BID    enoxparin  1 mg/kg Subcutaneous Q12H (prophylaxis, 0900/2100)    folic acid  1 mg Oral Daily    levalbuterol  1.25 mg Nebulization Q6H    LIDOcaine (PF) 10 mg/ml (1%)  1 mL Intradermal Once    midodrine  10 mg Oral TID WM    multivitamin  1 tablet Oral Daily    mupirocin   Nasal BID    pantoprazole  40 mg Intravenous BID    piperacillin-tazobactam (Zosyn) IV (PEDS and ADULTS) (extended infusion is not appropriate)  4.5 g Intravenous Q8H    scopolamine  1 patch Transdermal Q3 Days    sucralfate  1 g Oral QID (AC & HS)    thiamine (B-1) 500 mg in dextrose 5 % (D5W) 100 mL IVPB  500 mg Intravenous TID      Continuous Infusions:   sodium chloride 0.9% 50 mL/hr at 03/23/24 2124      PRN Meds:  0.9%  NaCl infusion (for blood administration), acetaminophen, dextrose 10 % in water (D10W), dextrose 10 % in water (D10W),  dextrose 10%, dextrose 10%, insulin aspart U-100, ipratropium, LORazepam, melatonin, morphine, oxyCODONE, sodium chloride 0.9%, sodium chloride 0.9%     Assessment/Plan:  Scrotal abscess s/p I&D with left joselito orchiectomy 03/18/2024  UTI  Abnormal Chest Imaging with Rt lower lobe Infiltrate, Suspect Post Viral Pneumonia  Human Metapneumovirus +ve  Sepsis from above requiring vasopressor supoort  Acute hypoxic respiratory failure  Recent cardiac arrest with ventricular tachycardia requiring defibrillation x3 (03/18/2024)  AFib RVR requiring amiodarone drip  NSTMI, unspecified  Partial bowel obstruction  Normocytic anemia.  FOBT positive  Electrolyte derangements   Abnormal CTA-abdominal aortic aneurysmal dilatation  with mural thrombus     PMH of  cardiomyopathy, systolic heart failure with an EF of 41%, VHD, chronic AFib on Xarelto, peripheral arterial disease, COPD, HTN, and a large left testicle hydrocele         Plan   Urology on board, continued with dressing changes, Continue the current antibiotics-on Vancomycin and Zosyn.  Monitor fever curve and WBC.  On Vancomycin 3/17 and Zosyn 3/17.  Reviewed microbiology data.  Blood cultures so far negative.  Sputum with yeast.We will follow up with Urology if recommending any specific duration on antimicrobials.    Monitor BP, urine output off of vasopressors.Keep MAP>65.  Holding antihypertensives. If pressures drop, may need vasopressor support back again.  Wean oxygen. Bronchodilators prn  Cardiology following, Monitor on telemetry, monitor and replete electrolytes, on amiodarone.  May need ischemic evaluation once acute issues stabilize , likely next week.  On Lovenox full dose b.i.d.  GI following, underwent endoscopy, found gastritis, duodenitis, reflux esophagitis.  Okay for anticoagulation.  May need outpatient colonoscopy, recommended Carafate for 7 days commenting dicyclomine or hyoscyamine q.6 p.r.n. for abdominal pain, Protonix 40 IV b.i.d. while inpatient,  transition to p.o. for a month on discharge and then daily for 3 months.  Monitor H&H, as it dropped last night , GI was reconsulted . Ok for anticoagulation, monitoring H and H. We think its more of dilutional.  Speech therapy following.  PTOT -moderate intensity.  Continue supportive care.          Critical care Time Spent>35 min  Sepsis, Aib RVR, acute hypoxic respiratory failure    VTE prophylaxis:  Lovenox full dose      Anticipated discharge and Disposition:   To be decided, pending ischemic evaluation by Cardiology, urology clearance, would need placement. Needs outpatient colonoscopy      All diagnosis and differential diagnosis have been reviewed; assessment and plan has been documented; I have personally reviewed the labs and test results that are presently available; I have reviewed the patients medication list; I have reviewed the consulting providers response and recommendations. I have reviewed or attempted to review medical records based upon their availability    All of the patient's questions have been  addressed and answered. Patient's is agreeable to the above stated plan. I will continue to monitor closely and make adjustments to medical management as needed.  _____________________________________________________________________    Nutrition Status:    Radiology:  I have personally reviewed the following imaging and agree with the radiologist.     XR Gastric tube check, non-radiologist performed  EXAMINATION:  XR GASTRIC TUBE CHECK, NON-RADIOLOGIST PERFORMED    CLINICAL HISTORY:  Correct Placement Check;    TECHNIQUE:  AP View(s) of the abdomen was performed.    COMPARISON:  03/19/2024    FINDINGS:  Enteric tube terminates in the stomach.  Side port is at the GE junction.  Suggest advancing approximately 7 cm for more optimal positioning.    Electronically signed by: Sailaja Atkinson  Date:    03/20/2024  Time:    17:03  X-Ray Chest 1 View  EXAMINATION  XR CHEST 1 VIEW    CLINICAL  HISTORY  Intubation;    TECHNIQUE  A total of 1 frontal image(s) of the chest.    COMPARISON  19 March 2024    FINDINGS  Lines/tubes/devices: Grossly unchanged positioning when allowing for differences in technique and patient rotation.    The cardiac silhouette and central vascular structures are unchanged.  The trachea is midline. Hazy ill-defined infiltrate is more pronounced at the right lower lung zone.  Left lung field remains relatively clear.  There is no enlarging pleural effusion or convincing pneumothorax.    Regional osseous structures and extrathoracic soft tissues are similar.    IMPRESSION  1. Mildly worsened ill-defined right lower lung infiltrate.  2. Remaining findings similar.    Electronically signed by: Ramana Mcleod  Date:    03/20/2024  Time:    07:45      Natali Seymour MD  Department of Hospital Medicine   [facility]   03/24/2024

## 2024-03-24 NOTE — PROGRESS NOTES
"  [facility]    Cardiology  Progress Note    Patient Name: Ran Reyes Jr.  MRN: [id number]  Admission Date: 3/17/2024  Hospital Length of Stay: 6 days  Code Status: Full Code   Attending Physician: Natali Seymour MD   Primary Care Physician: Shiela, Primary Doctor  Expected Discharge Date:   Principal Problem:Scrotal hematoma    Subjective:   Chief Complaint/Reason for Consult: OAC recs     HPI: Ran Reyes Jr. Is a 68 year old male, known to Dr. Wagner, with PMH of  cardiomyopathy, systolic heart failure with an EF of 41%, VHD, chronic AFib on Xarelto, peripheral arterial disease, COPD, HTN, and a large left testicle hydrocele who presented to the ED 3/18/24 for scrotal bleeding with testicular swelling and pain. Found to have sepsis likely due to UTI, acute bronchitis. Also found in ED to be in Afib RVR on EKG, bp 90s/60s. BNP 1273, troponin 0.045 --> 0.055. Patient admits to dyspnea, cough, and wheezing. Denies chest pains. Cardiology being consulted for OAC recommendations.    Hospital Course:  3.19.24: Intubated/Mechanical Ventilation. AF SVR. On Vasopressor Support. Family at bedside.   3.20.24: NAD. Vented/Sedated. PAF/CVR. Dobutamine 2.5mcg/kg/min. Amiodarone 0.5mg/min.   3.21.24: NAD. Extubated. "I am ok." PAF/CVR. Levophed 0.01mcg/kg/min. Oral Amiodarone.   3.22.24: NAD. "I am feeling better." PAF/SVR but Mostly CVR. Off Pressors.   3.23.24: NAD. "I am good." PAF/SVR - Mostly CVR. No Profound Bradycardia since Yesterday. Remains off Pressors.   3.24.24: NAD. "I am fine." PAF/SVR, Mostly CVR in 60s, PVCs. H&H 7.3/23.3 - Transfusion of PRBCs.     PMH: Cardiomyopathy, systolic heart failure with an EF of 41%, VHD, chronic AFib on Xarelto, peripheral arterial disease, COPD, HTN, CAD (Details Unclear)  PSH: cardiac stent >10 years ago, L FA atherectomy and angioplasty, R SFA stent, cataract extraction,   Family History: Father MI at age 66.   Social History: 10+ pack year hx current smoker, " social EtOH, denies drugs.     Previous Cardiac Diagnostics:   Echocardiogram (3.19.24):  Left Ventricle: The left ventricle is normal in size. Increased wall thickness. Unable to assess wall motion. There is moderately reduced systolic function with a visually estimated ejection fraction of 30 - 40%.  Right Ventricle: Mild right ventricular enlargement.  Left Atrium: Left atrium is moderately dilated.  Right Atrium: Right atrium is severely dilated.  Mitral Valve: There is moderate regurgitation.  Tricuspid Valve: There is mild to moderate regurgitation.  Pulmonary Artery: Pulmonary artery pressure could not be estimated.  IVC/SVC: Patient is ventilated, cannot use IVC diameter to estimate right atrial pressure.    Echocardiogram (1.31.24):   Left Ventricle: The left ventricle is normal in size. Mildly increased wall thickness. There is reduced systolic function. Biplane (2D) method of discs ejection fraction is 41%. Unable to assess diastolic function due to atrial fibrillation.  Right Ventricle: Normal right ventricular cavity size. Systolic function is mildly reduced.  Left Atrium: Left atrium is severely dilated.  Right Atrium: Right atrium is severely dilated.  Aortic Valve: The aortic valve is a trileaflet valve.  Mitral Valve: There is bileaflet sclerosis. There is moderate to severe regurgitation.  Tricuspid Valve: There is mild regurgitation.  IVC/SVC: Normal venous pressure at 3 mmHg.    Carotid US (2.7.23):  The study quality is average.   1-39% stenosis in the proximal right internal carotid artery based on Bluth Criteria. Antegrade right vertebral artery flow.   1-39% stenosis in the proximal left internal carotid artery based on Bluth Criteria. Antegrade left vertebral artery flow.     Echocardiogram (11.8.22):  The study quality is average.   The left ventricle is moderately enlarged. Left ventricular diastolic dimension is 6.4 cms. Global left ventricular systolic function is moderately decreased.  The left ventricular ejection fraction is 40%. Arrhythmia noted throughout much of the exam.   There is no evidence of mitral stenosis or prolapse appreciated. MV Ortiz score ~ 5. The area by planimetry is 5.3 cm². The mean trans mitral gradient is 1.6 mmHg. Suspect borderline severe (4+) mitral regurgitation with a posteriorly directed jet. MR PISA radius ~ 0.93 cm, MR ERO ~ 0.35 cm^2, MR regurgitant volume ~ 72 ml/beat, MR regurgitant fraction ~ 55%, MR vena contracta ~ 0.54 cm.  Mild calcification of the aortic valve is noted with adequate cuspal excursion.   Trace pulmonic regurgitation. Mild (1+) tricuspid regurgitation.    Peripheral Angiogram (10.17.22):  Underwent Successful CSI Atherectomy Balloon Angioplasty and Stenting of the Right SFA and CSI Atherectomy Balloon Angioplasty of Right Anterior Tibial Artery Using Pedal Approach.    Peripheral Angiogram (9.12.22):  Underwent Successful Left SFA Laser Atherectomy Balloon Angioplasty Using Left Radial Artery Approach.    Review of Systems   Constitutional: Positive for malaise/fatigue.   Cardiovascular:  Negative for chest pain and leg swelling.   Respiratory:  Negative for shortness of breath.    Skin:         Scrotal Wound   All other systems reviewed and are negative.    Objective:     Vital Signs (Most Recent):  Temp: 98 °F (36.7 °C) (03/24/24 1200)  Pulse: 69 (03/24/24 1200)  Resp: (!) 26 (03/24/24 1200)  BP: 120/65 (03/24/24 1115)  SpO2: 99 % (03/24/24 1100) Vital Signs (24h Range):  Temp:  [98 °F (36.7 °C)-98.6 °F (37 °C)] 98 °F (36.7 °C)  Pulse:  [] 69  Resp:  [15-31] 26  SpO2:  [91 %-99 %] 99 %  BP: ()/(37-86) 120/65   Weight: 89.8 kg (198 lb)  Body mass index is 27.62 kg/m².  SpO2: 99 %       Intake/Output Summary (Last 24 hours) at 3/24/2024 1226  Last data filed at 3/24/2024 0955  Gross per 24 hour   Intake 480 ml   Output 901 ml   Net -421 ml       Lines/Drains/Airways       Drain  Duration                  Urethral Catheter  03/18/24 1426 Straight-tip 16 Fr. 5 days              Peripheral Intravenous Line  Duration                  Peripheral IV - Single Lumen 03/18/24 1930 20 G Distal;Posterior;Right Forearm 5 days         Peripheral IV - Single Lumen 03/24/24 1049 22 G Anterior;Left Upper Arm <1 day                  Significant Labs:   Recent Results (from the past 72 hour(s))   Phosphorus    Collection Time: 03/22/24  1:20 AM   Result Value Ref Range    Phosphorus Level 3.2 2.3 - 4.7 mg/dL   Magnesium    Collection Time: 03/22/24  1:20 AM   Result Value Ref Range    Magnesium Level 2.10 1.60 - 2.60 mg/dL   Comprehensive metabolic panel    Collection Time: 03/22/24  1:20 AM   Result Value Ref Range    Sodium Level 143 136 - 145 mmol/L    Potassium Level 3.2 (L) 3.5 - 5.1 mmol/L    Chloride 106 98 - 107 mmol/L    Carbon Dioxide 26 23 - 31 mmol/L    Glucose Level 108 82 - 115 mg/dL    Blood Urea Nitrogen 7.2 (L) 8.4 - 25.7 mg/dL    Creatinine 0.83 0.73 - 1.18 mg/dL    Calcium Level Total 7.9 (L) 8.8 - 10.0 mg/dL    Protein Total 5.5 (L) 5.8 - 7.6 gm/dL    Albumin Level 2.0 (L) 3.4 - 4.8 g/dL    Globulin 3.5 2.4 - 3.5 gm/dL    Albumin/Globulin Ratio 0.6 (L) 1.1 - 2.0 ratio    Bilirubin Total 0.4 <=1.5 mg/dL    Alkaline Phosphatase 59 40 - 150 unit/L    Alanine Aminotransferase 11 0 - 55 unit/L    Aspartate Aminotransferase 20 5 - 34 unit/L    eGFR >60 mls/min/1.73/m2   Triglycerides    Collection Time: 03/22/24  1:20 AM   Result Value Ref Range    Triglyceride 69 34 - 140 mg/dL   CBC with Differential    Collection Time: 03/22/24  1:20 AM   Result Value Ref Range    WBC 8.83 4.50 - 11.50 x10(3)/mcL    RBC 2.79 (L) 4.70 - 6.10 x10(6)/mcL    Hgb 8.4 (L) 14.0 - 18.0 g/dL    Hct 26.3 (L) 42.0 - 52.0 %    MCV 94.3 (H) 80.0 - 94.0 fL    MCH 30.1 27.0 - 31.0 pg    MCHC 31.9 (L) 33.0 - 36.0 g/dL    RDW 16.0 11.5 - 17.0 %    Platelet 241 130 - 400 x10(3)/mcL    MPV 10.8 (H) 7.4 - 10.4 fL    Neut % 70.5 %    Lymph % 14.0 %    Mono % 10.3 %    Eos  % 0.5 %    Basophil % 0.5 %    Lymph # 1.24 0.6 - 4.6 x10(3)/mcL    Neut # 6.23 2.1 - 9.2 x10(3)/mcL    Mono # 0.91 0.1 - 1.3 x10(3)/mcL    Eos # 0.04 0 - 0.9 x10(3)/mcL    Baso # 0.04 <=0.2 x10(3)/mcL    IG# 0.37 (H) 0 - 0.04 x10(3)/mcL    IG% 4.2 %    NRBC% 0.5 %   Vancomycin, Random    Collection Time: 03/22/24  9:07 AM   Result Value Ref Range    Vanc Lvl Random 19.0 15.0 - 20.0 ug/ml   POCT glucose    Collection Time: 03/22/24  9:06 PM   Result Value Ref Range    POCT Glucose 117 (H) 70 - 110 mg/dL   Phosphorus    Collection Time: 03/23/24  1:22 AM   Result Value Ref Range    Phosphorus Level 2.9 2.3 - 4.7 mg/dL   Magnesium    Collection Time: 03/23/24  1:22 AM   Result Value Ref Range    Magnesium Level 2.40 1.60 - 2.60 mg/dL   Comprehensive metabolic panel    Collection Time: 03/23/24  1:22 AM   Result Value Ref Range    Sodium Level 143 136 - 145 mmol/L    Potassium Level 4.0 3.5 - 5.1 mmol/L    Chloride 106 98 - 107 mmol/L    Carbon Dioxide 25 23 - 31 mmol/L    Glucose Level 121 (H) 82 - 115 mg/dL    Blood Urea Nitrogen 11.6 8.4 - 25.7 mg/dL    Creatinine 1.08 0.73 - 1.18 mg/dL    Calcium Level Total 8.2 (L) 8.8 - 10.0 mg/dL    Protein Total 6.1 5.8 - 7.6 gm/dL    Albumin Level 2.2 (L) 3.4 - 4.8 g/dL    Globulin 3.9 (H) 2.4 - 3.5 gm/dL    Albumin/Globulin Ratio 0.6 (L) 1.1 - 2.0 ratio    Bilirubin Total 0.4 <=1.5 mg/dL    Alkaline Phosphatase 64 40 - 150 unit/L    Alanine Aminotransferase 9 0 - 55 unit/L    Aspartate Aminotransferase 21 5 - 34 unit/L    eGFR >60 mls/min/1.73/m2   Triglycerides    Collection Time: 03/23/24  1:22 AM   Result Value Ref Range    Triglyceride 61 34 - 140 mg/dL   CBC with Differential    Collection Time: 03/23/24  1:22 AM   Result Value Ref Range    WBC 8.72 4.50 - 11.50 x10(3)/mcL    RBC 2.75 (L) 4.70 - 6.10 x10(6)/mcL    Hgb 8.5 (L) 14.0 - 18.0 g/dL    Hct 26.2 (L) 42.0 - 52.0 %    MCV 95.3 (H) 80.0 - 94.0 fL    MCH 30.9 27.0 - 31.0 pg    MCHC 32.4 (L) 33.0 - 36.0 g/dL    RDW  16.8 11.5 - 17.0 %    Platelet 339 130 - 400 x10(3)/mcL    MPV 10.7 (H) 7.4 - 10.4 fL    Neut % 67.5 %    Lymph % 16.2 %    Mono % 10.4 %    Eos % 0.9 %    Basophil % 0.5 %    Lymph # 1.41 0.6 - 4.6 x10(3)/mcL    Neut # 5.89 2.1 - 9.2 x10(3)/mcL    Mono # 0.91 0.1 - 1.3 x10(3)/mcL    Eos # 0.08 0 - 0.9 x10(3)/mcL    Baso # 0.04 <=0.2 x10(3)/mcL    IG# 0.39 (H) 0 - 0.04 x10(3)/mcL    IG% 4.5 %    NRBC% 0.5 %   Triglycerides    Collection Time: 03/24/24  1:53 AM   Result Value Ref Range    Triglyceride 58 34 - 140 mg/dL   Comprehensive metabolic panel    Collection Time: 03/24/24  1:53 AM   Result Value Ref Range    Sodium Level 142 136 - 145 mmol/L    Potassium Level 4.0 3.5 - 5.1 mmol/L    Chloride 105 98 - 107 mmol/L    Carbon Dioxide 26 23 - 31 mmol/L    Glucose Level 99 82 - 115 mg/dL    Blood Urea Nitrogen 16.7 8.4 - 25.7 mg/dL    Creatinine 1.07 0.73 - 1.18 mg/dL    Calcium Level Total 8.1 (L) 8.8 - 10.0 mg/dL    Protein Total 5.4 (L) 5.8 - 7.6 gm/dL    Albumin Level 2.3 (L) 3.4 - 4.8 g/dL    Globulin 3.1 2.4 - 3.5 gm/dL    Albumin/Globulin Ratio 0.7 (L) 1.1 - 2.0 ratio    Bilirubin Total 0.5 <=1.5 mg/dL    Alkaline Phosphatase 64 40 - 150 unit/L    Alanine Aminotransferase 10 0 - 55 unit/L    Aspartate Aminotransferase 19 5 - 34 unit/L    eGFR >60 mls/min/1.73/m2   Magnesium    Collection Time: 03/24/24  1:53 AM   Result Value Ref Range    Magnesium Level 2.10 1.60 - 2.60 mg/dL   Phosphorus    Collection Time: 03/24/24  1:53 AM   Result Value Ref Range    Phosphorus Level 2.7 2.3 - 4.7 mg/dL   CBC with Differential    Collection Time: 03/24/24  1:53 AM   Result Value Ref Range    WBC 8.44 4.50 - 11.50 x10(3)/mcL    RBC 2.41 (L) 4.70 - 6.10 x10(6)/mcL    Hgb 7.3 (L) 14.0 - 18.0 g/dL    Hct 23.3 (L) 42.0 - 52.0 %    MCV 96.7 (H) 80.0 - 94.0 fL    MCH 30.3 27.0 - 31.0 pg    MCHC 31.3 (L) 33.0 - 36.0 g/dL    RDW 16.7 11.5 - 17.0 %    Platelet 378 130 - 400 x10(3)/mcL    MPV 9.9 7.4 - 10.4 fL    Neut % 70.0 %     Lymph % 17.5 %    Mono % 8.1 %    Eos % 0.9 %    Basophil % 0.4 %    Lymph # 1.48 0.6 - 4.6 x10(3)/mcL    Neut # 5.91 2.1 - 9.2 x10(3)/mcL    Mono # 0.68 0.1 - 1.3 x10(3)/mcL    Eos # 0.08 0 - 0.9 x10(3)/mcL    Baso # 0.03 <=0.2 x10(3)/mcL    IG# 0.26 (H) 0 - 0.04 x10(3)/mcL    IG% 3.1 %    NRBC% 0.2 %   Prepare RBC 1 Unit    Collection Time: 03/24/24  4:31 AM   Result Value Ref Range    UNIT NUMBER [id number]     UNIT ABO/RH A POS     DISPENSE STATUS Issued     Unit Expiration [id number]     Product Code L1751L62     Unit Blood Type Code 6200     CROSSMATCH INTERPRETATION Compatible    Type & Screen    Collection Time: 03/24/24  4:31 AM   Result Value Ref Range    Group & Rh A POS     Indirect Shahab GEL NEG     Specimen Outdate 03/27/2024 23:59      Telemetry: PAF/CVR    Physical Exam  Vitals reviewed.   Constitutional:       General: He is not in acute distress.     Appearance: Normal appearance. He is ill-appearing.   HENT:      Mouth/Throat:      Mouth: Mucous membranes are dry.   Eyes:      Extraocular Movements: Extraocular movements intact.      Conjunctiva/sclera: Conjunctivae normal.   Cardiovascular:      Rate and Rhythm: Normal rate. Rhythm irregular.      Heart sounds: Murmur heard.   Pulmonary:      Effort: Pulmonary effort is normal. No respiratory distress.      Breath sounds: Rhonchi present.      Comments: NC O2  Genitourinary:     Comments: Scrotal Sling in Place, Dressing C/D/I  Musculoskeletal:         General: No swelling.      Right lower leg: No edema.      Left lower leg: No edema.   Neurological:      General: No focal deficit present.      Mental Status: He is alert and oriented to person, place, and time.   Psychiatric:         Mood and Affect: Mood normal.         Behavior: Behavior normal.       Current Inpatient Medications:    Current Facility-Administered Medications:     0.9%  NaCl infusion (for blood administration), , Intravenous, Q24H PRN, Carmen Griggs, FNP    0.9%   NaCl infusion, , Intravenous, Continuous, Natali Seymour MD, Last Rate: 50 mL/hr at 03/23/24 2124, Rate Change at 03/23/24 2124    acetaminophen tablet 650 mg, 650 mg, Oral, Q8H PRN, Harris Hernandez MD    albuterol-ipratropium 2.5 mg-0.5 mg/3 mL nebulizer solution 3 mL, 3 mL, Nebulization, Once, Natali Seymour MD    atorvastatin tablet 80 mg, 80 mg, Oral, Daily, Cassidy Obrien MD, 80 mg at 03/24/24 0919    chlorhexidine 0.12 % solution 15 mL, 15 mL, Mouth/Throat, BID, David Gonsalez MD, 15 mL at 03/24/24 0920    dextrose 10 % infusion, , Intravenous, PRN, David Gonsalez MD    dextrose 10 % infusion, , Intravenous, PRN, David Gonsalez MD    dextrose 10% bolus 125 mL 125 mL, 12.5 g, Intravenous, PRN, David Gonsalez MD    dextrose 10% bolus 250 mL 250 mL, 25 g, Intravenous, PRN, David Gonsalez MD    enoxaparin injection 90 mg, 1 mg/kg, Subcutaneous, Q12H (prophylaxis, 0900/2100), Natali Seymour MD, 90 mg at 03/24/24 0920    folic acid tablet 1 mg, 1 mg, Oral, Daily, Jenna Miller MD, 1 mg at 03/24/24 0920    insulin aspart U-100 injection 0-10 Units, 0-10 Units, Subcutaneous, Q6H PRN, Narendra Pradhan DO    ipratropium 0.02 % nebulizer solution 0.5 mg, 0.5 mg, Nebulization, Q6H PRN, David Gonsalez MD    levalbuterol nebulizer solution 1.25 mg, 1.25 mg, Nebulization, Q6H, Narendra Pradhan DO, 1.25 mg at 03/24/24 0730    LIDOcaine (PF) 10 mg/ml (1%) injection 10 mg, 1 mL, Intradermal, Once, Julian, Maxi C, DO    LORazepam tablet 2 mg, 2 mg, Oral, Q4H PRN, Jenna Miller MD, 2 mg at 03/21/24 0031    melatonin tablet 6 mg, 6 mg, Oral, Nightly PRN, Harris Hernandez MD    midodrine tablet 10 mg, 10 mg, Oral, TID WM, Narendra Pradhan, DO, 10 mg at 03/24/24 1150    morphine injection 2 mg, 2 mg, Intravenous, Q6H PRN, David Gonsalez MD, 2 mg at 03/22/24 1657    multivitamin tablet, 1 tablet, Oral, Daily, Jenna Miller MD, 1 tablet at 03/24/24 0920    mupirocin 2 %  ointment, , Nasal, BID, Cassidy Obrien MD, Given at 03/24/24 0920    oxyCODONE immediate release tablet 5 mg, 5 mg, Oral, Q4H PRN, Harris Hernandez MD, 5 mg at 03/24/24 0920    pantoprazole injection 40 mg, 40 mg, Intravenous, BID, Cassidy Obrien MD, 40 mg at 03/24/24 0920    piperacillin-tazobactam (ZOSYN) 4.5 g in dextrose 5 % in water (D5W) 100 mL IVPB (MB+), 4.5 g, Intravenous, Q8H, Harris Hernandez MD, Last Rate: 25 mL/hr at 03/24/24 0926, 4.5 g at 03/24/24 0926    scopolamine 1.3-1.5 mg (1 mg over 3 days) 1 patch, 1 patch, Transdermal, Q3 Days, Joe Clarke MD, 1 patch at 03/23/24 1502    sodium chloride 0.9% flush 10 mL, 10 mL, Intravenous, PRN, Narendra Pradhan,     sodium chloride 0.9% flush 10 mL, 10 mL, Intravenous, PRN, David Gonsalez MD    sucralfate tablet 1 g, 1 g, Oral, QID (AC & HS), Jenna Miller MD, 1 g at 03/24/24 1150    thiamine (B-1) 500 mg in dextrose 5 % (D5W) 100 mL IVPB, 500 mg, Intravenous, TID, David Gonsalez MD, Stopped at 03/24/24 0955    Facility-Administered Medications Ordered in Other Encounters:     0.9%  NaCl infusion, , Intravenous, Continuous, Shannon Milligan MD, Last Rate: 10 mL/hr at 03/09/23 1020, New Bag at 03/09/23 1020    diphenhydrAMINE injection 25 mg, 25 mg, Intravenous, Once PRN, Shannon Milligan MD    LIDOcaine (PF) 10 mg/ml (1%) injection 10 mg, 1 mL, Intradermal, Once, Lanette Ulloa FNP    LIDOcaine (PF) 10 mg/ml (1%) injection 10 mg, 1 mL, Intradermal, Once, Shannon Milligan MD    ondansetron injection 4 mg, 4 mg, Intravenous, Once, Shannon Milligan MD    prochlorperazine injection Soln 5 mg, 5 mg, Intravenous, Once PRN, Shannon Milligan MD  VTE Risk Mitigation (From admission, onward)           Ordered     enoxaparin injection 90 mg  Every 12 hours         03/24/24 0901     IP VTE LOW RISK PATIENT  Once         03/19/24 0422     Place sequential compression device  Until discontinued         03/18/24 0124                   Assessment:   Cardiac Arrest/VT (Post Operative Left Héctor Orchiectomy - in PACU 3.18.24)     - Requiring Multiple Defibrillations (x 3)    - On Amiodarone Infusion  Anemia with Stool + for OCB - Worsening     - Requiring Transfusion (3.24.24) - H&H 7.3/23.3    - EGD (3.22.24) - LA Grade B Reflux Esophagitis with No Bleeding, Chronic Gastritis, Protonix; Colonoscopy as OP 2/2 Scrotal Wound  Chronic Atrial Fibrillation- Now AF CVR (HR 50's-60s with Intermittent PVCS)    - Xarelto on Hold Post Scrotal Abscess I&D  NSTEMI (Unspecified for Now)  Acute Hypoxemic Respiratory Failure requiring Intubation/Ventilation - Now Extubated on NC O2  Hypotension Requiring Vasopressor Support - Resolved     - History of Hypertension  Valvular Heart Disease    - MR: Moderate, TR: Mild to Moderate  Hyperlipidemia    - On Statin  Sepsis - Likely UTI Related   Scrotal Abscess    - Status Post Surgical Exploration, Left Orchiectomy, & Debridement of Wound/Wound Packing (3.18.24)   COPD  Partial Bowel Obstruction   Long Term Oral Anticoagulation  Cardiomyopathy (Unspecified)    - EF 30-40%  CAD (Details Unclear)  PAD  Infrarenal Abdominal Aortic Aneurysmal Dilatation with Mural Thrombus (Stable)    - Maximum diameter is 3.6 cm without significant interval change   Acute Human Metapneumovirus Infection (On Droplet Precautions)    Plan:   Continue Oral Amiodarone Load - No Further Episodes of VT  GI Consulted - EGD as per Above - Anemia Worsened (Transfusing with PRBCs)  Keep K > 4.0 and Mg > 2.0   Antibiotic Management as per Primary Team  Consider Ischemic Evaluation once acute medical issues stabilize, and cleared for anticoagulation by GI Team (s/p EGD - Awaiting GI Recommendations)  NPO after MN  Will Continue to Follow  Labs in AM: CBC, CMP and Mg    Joshua Hernandez, RAN  Cardiology  [facility]  03/24/2024     I agree with the findings of the complexity of problems addressed and take responsibility for the  plan's risks and complications. I approved the plan documented by Joshua Hernandez NP.  Possible cath in am

## 2024-03-25 LAB
ABO + RH BLD: NORMAL
ALBUMIN SERPL-MCNC: 2.4 G/DL (ref 3.4–4.8)
ALBUMIN/GLOB SERPL: 0.6 RATIO (ref 1.1–2)
ALP SERPL-CCNC: 67 UNIT/L (ref 40–150)
ALT SERPL-CCNC: 9 UNIT/L (ref 0–55)
AST SERPL-CCNC: 19 UNIT/L (ref 5–34)
BASOPHILS # BLD AUTO: 0.03 X10(3)/MCL
BASOPHILS NFR BLD AUTO: 0.4 %
BILIRUB SERPL-MCNC: 0.8 MG/DL
BLD PROD TYP BPU: NORMAL
BLOOD UNIT EXPIRATION DATE: NORMAL
BLOOD UNIT TYPE CODE: 600
BUN SERPL-MCNC: 16.5 MG/DL (ref 8.4–25.7)
CALCIUM SERPL-MCNC: 8.3 MG/DL (ref 8.8–10)
CHLORIDE SERPL-SCNC: 106 MMOL/L (ref 98–107)
CO2 SERPL-SCNC: 26 MMOL/L (ref 23–31)
CREAT SERPL-MCNC: 1.29 MG/DL (ref 0.73–1.18)
CREAT UR-MCNC: 143.4 MG/DL (ref 63–166)
CROSSMATCH INTERPRETATION: NORMAL
DISPENSE STATUS: NORMAL
EOSINOPHIL # BLD AUTO: 0.06 X10(3)/MCL (ref 0–0.9)
EOSINOPHIL NFR BLD AUTO: 0.8 %
ERYTHROCYTE [DISTWIDTH] IN BLOOD BY AUTOMATED COUNT: 17.2 % (ref 11.5–17)
GFR SERPLBLD CREATININE-BSD FMLA CKD-EPI: >60 MLS/MIN/1.73/M2
GLOBULIN SER-MCNC: 3.7 GM/DL (ref 2.4–3.5)
GLUCOSE SERPL-MCNC: 104 MG/DL (ref 82–115)
HCT VFR BLD AUTO: 26.2 % (ref 42–52)
HGB BLD-MCNC: 8.7 G/DL (ref 14–18)
IMM GRANULOCYTES # BLD AUTO: 0.15 X10(3)/MCL (ref 0–0.04)
IMM GRANULOCYTES NFR BLD AUTO: 1.9 %
LYMPHOCYTES # BLD AUTO: 1.27 X10(3)/MCL (ref 0.6–4.6)
LYMPHOCYTES NFR BLD AUTO: 16.1 %
MAGNESIUM SERPL-MCNC: 2.1 MG/DL (ref 1.6–2.6)
MCH RBC QN AUTO: 31.3 PG (ref 27–31)
MCHC RBC AUTO-ENTMCNC: 33.2 G/DL (ref 33–36)
MCV RBC AUTO: 94.2 FL (ref 80–94)
MONOCYTES # BLD AUTO: 0.73 X10(3)/MCL (ref 0.1–1.3)
MONOCYTES NFR BLD AUTO: 9.3 %
NEUTROPHILS # BLD AUTO: 5.63 X10(3)/MCL (ref 2.1–9.2)
NEUTROPHILS NFR BLD AUTO: 71.5 %
NRBC BLD AUTO-RTO: 0.3 %
OSMOLALITY UR: 527 MOSM/KG (ref 300–1300)
PHOSPHATE SERPL-MCNC: 3.3 MG/DL (ref 2.3–4.7)
PLATELET # BLD AUTO: 407 X10(3)/MCL (ref 130–400)
PMV BLD AUTO: 9.8 FL (ref 7.4–10.4)
POTASSIUM SERPL-SCNC: 4.2 MMOL/L (ref 3.5–5.1)
PROT SERPL-MCNC: 6.1 GM/DL (ref 5.8–7.6)
PSYCHE PATHOLOGY RESULT: NORMAL
RBC # BLD AUTO: 2.78 X10(6)/MCL (ref 4.7–6.1)
SODIUM SERPL-SCNC: 145 MMOL/L (ref 136–145)
SODIUM UR-SCNC: 75 MMOL/L
TRIGL SERPL-MCNC: 64 MG/DL (ref 34–140)
UNIT NUMBER: NORMAL
URATE SERPL-MCNC: 3.1 MG/DL (ref 3.5–7.2)
UUN UR-MCNC: 677 MG/DL
WBC # SPEC AUTO: 7.87 X10(3)/MCL (ref 4.5–11.5)

## 2024-03-25 PROCEDURE — 82570 ASSAY OF URINE CREATININE: CPT | Performed by: INTERNAL MEDICINE

## 2024-03-25 PROCEDURE — 94760 N-INVAS EAR/PLS OXIMETRY 1: CPT

## 2024-03-25 PROCEDURE — 36430 TRANSFUSION BLD/BLD COMPNT: CPT

## 2024-03-25 PROCEDURE — 97116 GAIT TRAINING THERAPY: CPT

## 2024-03-25 PROCEDURE — 63600175 PHARM REV CODE 636 W HCPCS: Performed by: INTERNAL MEDICINE

## 2024-03-25 PROCEDURE — 25000003 PHARM REV CODE 250: Performed by: INTERNAL MEDICINE

## 2024-03-25 PROCEDURE — 83735 ASSAY OF MAGNESIUM: CPT

## 2024-03-25 PROCEDURE — 84478 ASSAY OF TRIGLYCERIDES: CPT

## 2024-03-25 PROCEDURE — 25000003 PHARM REV CODE 250

## 2024-03-25 PROCEDURE — 84520 ASSAY OF UREA NITROGEN: CPT | Performed by: INTERNAL MEDICINE

## 2024-03-25 PROCEDURE — 63600175 PHARM REV CODE 636 W HCPCS

## 2024-03-25 PROCEDURE — 25000003 PHARM REV CODE 250: Performed by: STUDENT IN AN ORGANIZED HEALTH CARE EDUCATION/TRAINING PROGRAM

## 2024-03-25 PROCEDURE — 25000242 PHARM REV CODE 250 ALT 637 W/ HCPCS: Performed by: INTERNAL MEDICINE

## 2024-03-25 PROCEDURE — 83935 ASSAY OF URINE OSMOLALITY: CPT | Performed by: INTERNAL MEDICINE

## 2024-03-25 PROCEDURE — 84550 ASSAY OF BLOOD/URIC ACID: CPT | Performed by: INTERNAL MEDICINE

## 2024-03-25 PROCEDURE — 84300 ASSAY OF URINE SODIUM: CPT | Performed by: INTERNAL MEDICINE

## 2024-03-25 PROCEDURE — 94799 UNLISTED PULMONARY SVC/PX: CPT | Mod: XB

## 2024-03-25 PROCEDURE — C9113 INJ PANTOPRAZOLE SODIUM, VIA: HCPCS | Performed by: INTERNAL MEDICINE

## 2024-03-25 PROCEDURE — 85025 COMPLETE CBC W/AUTO DIFF WBC: CPT

## 2024-03-25 PROCEDURE — 99900031 HC PATIENT EDUCATION (STAT)

## 2024-03-25 PROCEDURE — 84100 ASSAY OF PHOSPHORUS: CPT

## 2024-03-25 PROCEDURE — 99900035 HC TECH TIME PER 15 MIN (STAT)

## 2024-03-25 PROCEDURE — 21400001 HC TELEMETRY ROOM

## 2024-03-25 PROCEDURE — P9016 RBC LEUKOCYTES REDUCED: HCPCS | Performed by: NURSE PRACTITIONER

## 2024-03-25 PROCEDURE — 94640 AIRWAY INHALATION TREATMENT: CPT

## 2024-03-25 PROCEDURE — 27000221 HC OXYGEN, UP TO 24 HOURS

## 2024-03-25 PROCEDURE — 86923 COMPATIBILITY TEST ELECTRIC: CPT | Performed by: NURSE PRACTITIONER

## 2024-03-25 PROCEDURE — 80053 COMPREHEN METABOLIC PANEL: CPT

## 2024-03-25 PROCEDURE — 97535 SELF CARE MNGMENT TRAINING: CPT

## 2024-03-25 RX ORDER — HYDROCODONE BITARTRATE AND ACETAMINOPHEN 500; 5 MG/1; MG/1
TABLET ORAL
Status: DISCONTINUED | OUTPATIENT
Start: 2024-03-25 | End: 2024-04-12 | Stop reason: HOSPADM

## 2024-03-25 RX ORDER — FUROSEMIDE 10 MG/ML
10 INJECTION INTRAMUSCULAR; INTRAVENOUS ONCE
Status: COMPLETED | OUTPATIENT
Start: 2024-03-25 | End: 2024-03-25

## 2024-03-25 RX ORDER — AMOXICILLIN AND CLAVULANATE POTASSIUM 875; 125 MG/1; MG/1
1 TABLET, FILM COATED ORAL EVERY 12 HOURS
Status: DISCONTINUED | OUTPATIENT
Start: 2024-03-25 | End: 2024-03-29

## 2024-03-25 RX ADMIN — PIPERACILLIN AND TAZOBACTAM 4.5 G: 4; .5 INJECTION, POWDER, LYOPHILIZED, FOR SOLUTION INTRAVENOUS; PARENTERAL at 01:03

## 2024-03-25 RX ADMIN — IPRATROPIUM BROMIDE AND ALBUTEROL SULFATE 3 ML: 2.5; .5 SOLUTION RESPIRATORY (INHALATION) at 04:03

## 2024-03-25 RX ADMIN — IPRATROPIUM BROMIDE AND ALBUTEROL SULFATE 3 ML: 2.5; .5 SOLUTION RESPIRATORY (INHALATION) at 08:03

## 2024-03-25 RX ADMIN — OXYCODONE HYDROCHLORIDE 5 MG: 5 TABLET ORAL at 09:03

## 2024-03-25 RX ADMIN — MIDODRINE HYDROCHLORIDE 10 MG: 5 TABLET ORAL at 04:03

## 2024-03-25 RX ADMIN — MIDODRINE HYDROCHLORIDE 10 MG: 5 TABLET ORAL at 11:03

## 2024-03-25 RX ADMIN — FOLIC ACID 1 MG: 1 TABLET ORAL at 09:03

## 2024-03-25 RX ADMIN — THIAMINE HYDROCHLORIDE 500 MG: 100 INJECTION, SOLUTION INTRAMUSCULAR; INTRAVENOUS at 09:03

## 2024-03-25 RX ADMIN — IPRATROPIUM BROMIDE AND ALBUTEROL SULFATE 3 ML: 2.5; .5 SOLUTION RESPIRATORY (INHALATION) at 05:03

## 2024-03-25 RX ADMIN — AMOXICILLIN AND CLAVULANATE POTASSIUM 1 TABLET: 875; 125 TABLET, FILM COATED ORAL at 09:03

## 2024-03-25 RX ADMIN — IPRATROPIUM BROMIDE AND ALBUTEROL SULFATE 3 ML: 2.5; .5 SOLUTION RESPIRATORY (INHALATION) at 12:03

## 2024-03-25 RX ADMIN — PANTOPRAZOLE SODIUM 40 MG: 40 INJECTION, POWDER, FOR SOLUTION INTRAVENOUS at 09:03

## 2024-03-25 RX ADMIN — MUPIROCIN: 20 OINTMENT TOPICAL at 09:03

## 2024-03-25 RX ADMIN — CHLORHEXIDINE GLUCONATE 0.12% ORAL RINSE 15 ML: 1.2 LIQUID ORAL at 09:03

## 2024-03-25 RX ADMIN — FUROSEMIDE 10 MG: 10 INJECTION, SOLUTION INTRAMUSCULAR; INTRAVENOUS at 10:03

## 2024-03-25 RX ADMIN — PIPERACILLIN AND TAZOBACTAM 4.5 G: 4; .5 INJECTION, POWDER, LYOPHILIZED, FOR SOLUTION INTRAVENOUS; PARENTERAL at 09:03

## 2024-03-25 RX ADMIN — ATORVASTATIN CALCIUM 80 MG: 40 TABLET, FILM COATED ORAL at 09:03

## 2024-03-25 RX ADMIN — SUCRALFATE 1 G: 1 TABLET ORAL at 09:03

## 2024-03-25 RX ADMIN — MIDODRINE HYDROCHLORIDE 10 MG: 5 TABLET ORAL at 08:03

## 2024-03-25 RX ADMIN — ENOXAPARIN SODIUM 90 MG: 100 INJECTION SUBCUTANEOUS at 09:03

## 2024-03-25 RX ADMIN — THIAMINE HYDROCHLORIDE 500 MG: 100 INJECTION, SOLUTION INTRAMUSCULAR; INTRAVENOUS at 02:03

## 2024-03-25 RX ADMIN — THIAMINE HYDROCHLORIDE 500 MG: 100 INJECTION, SOLUTION INTRAMUSCULAR; INTRAVENOUS at 10:03

## 2024-03-25 RX ADMIN — OXYCODONE HYDROCHLORIDE 5 MG: 5 TABLET ORAL at 05:03

## 2024-03-25 RX ADMIN — SUCRALFATE 1 G: 1 TABLET ORAL at 04:03

## 2024-03-25 RX ADMIN — SUCRALFATE 1 G: 1 TABLET ORAL at 11:03

## 2024-03-25 RX ADMIN — OXYCODONE HYDROCHLORIDE 5 MG: 5 TABLET ORAL at 01:03

## 2024-03-25 RX ADMIN — THERA TABS 1 TABLET: TAB at 09:03

## 2024-03-25 NOTE — PROGRESS NOTES
"  [facility]    Cardiology  Progress Note    Patient Name: Ran Reyes Jr.  MRN: [id number]  Admission Date: 3/17/2024  Hospital Length of Stay: 7 days  Code Status: Full Code   Attending Physician: Natali Seymour MD   Primary Care Physician: Shiela, Primary Doctor  Expected Discharge Date:   Principal Problem:Scrotal hematoma    Subjective:   Chief Complaint/Reason for Consult: OAC recs     HPI: Ran Reyes Jr. Is a 68 year old male, known to Dr. Wagner, with PMH of  cardiomyopathy, systolic heart failure with an EF of 41%, VHD, chronic AFib on Xarelto, peripheral arterial disease, COPD, HTN, and a large left testicle hydrocele who presented to the ED 3/18/24 for scrotal bleeding with testicular swelling and pain. Found to have sepsis likely due to UTI, acute bronchitis. Also found in ED to be in Afib RVR on EKG, bp 90s/60s. BNP 1273, troponin 0.045 --> 0.055. Patient admits to dyspnea, cough, and wheezing. Denies chest pains. Cardiology being consulted for OAC recommendations.    Hospital Course:  3.19.24: Intubated/Mechanical Ventilation. AF SVR. On Vasopressor Support. Family at bedside.   3.20.24: NAD. Vented/Sedated. PAF/CVR. Dobutamine 2.5mcg/kg/min. Amiodarone 0.5mg/min.   3.21.24: NAD. Extubated. "I am ok." PAF/CVR. Levophed 0.01mcg/kg/min. Oral Amiodarone.   3.22.24: NAD. "I am feeling better." PAF/SVR but Mostly CVR. Off Pressors.   3.23.24: NAD. "I am good." PAF/SVR - Mostly CVR. No Profound Bradycardia since Yesterday. Remains off Pressors.   3.24.24: NAD. "I am fine." PAF/SVR, Mostly CVR in 60s, PVCs. H&H 7.3/23.3 - Transfusion of PRBCs.  3.25.24: NAD. "I am ok." PAF/SVR. Mostly CVR 60s-70s. H&H 8.7/26.2; GI Clear for DAPT/AC     PMH: Cardiomyopathy, systolic heart failure with an EF of 41%, VHD, chronic AFib on Xarelto, peripheral arterial disease, COPD, HTN, CAD (Details Unclear)  PSH: cardiac stent >10 years ago, L FA atherectomy and angioplasty, R SFA stent, cataract extraction, "   Family History: Father MI at age 66.   Social History: 10+ pack year hx current smoker, social EtOH, denies drugs.     Previous Cardiac Diagnostics:   Echocardiogram (3.19.24):  Left Ventricle: The left ventricle is normal in size. Increased wall thickness. Unable to assess wall motion. There is moderately reduced systolic function with a visually estimated ejection fraction of 30 - 40%.  Right Ventricle: Mild right ventricular enlargement.  Left Atrium: Left atrium is moderately dilated.  Right Atrium: Right atrium is severely dilated.  Mitral Valve: There is moderate regurgitation.  Tricuspid Valve: There is mild to moderate regurgitation.  Pulmonary Artery: Pulmonary artery pressure could not be estimated.  IVC/SVC: Patient is ventilated, cannot use IVC diameter to estimate right atrial pressure.    Echocardiogram (1.31.24):   Left Ventricle: The left ventricle is normal in size. Mildly increased wall thickness. There is reduced systolic function. Biplane (2D) method of discs ejection fraction is 41%. Unable to assess diastolic function due to atrial fibrillation.  Right Ventricle: Normal right ventricular cavity size. Systolic function is mildly reduced.  Left Atrium: Left atrium is severely dilated.  Right Atrium: Right atrium is severely dilated.  Aortic Valve: The aortic valve is a trileaflet valve.  Mitral Valve: There is bileaflet sclerosis. There is moderate to severe regurgitation.  Tricuspid Valve: There is mild regurgitation.  IVC/SVC: Normal venous pressure at 3 mmHg.    Carotid US (2.7.23):  The study quality is average.   1-39% stenosis in the proximal right internal carotid artery based on Bluth Criteria. Antegrade right vertebral artery flow.   1-39% stenosis in the proximal left internal carotid artery based on Bluth Criteria. Antegrade left vertebral artery flow.     Echocardiogram (11.8.22):  The study quality is average.   The left ventricle is moderately enlarged. Left ventricular diastolic  dimension is 6.4 cms. Global left ventricular systolic function is moderately decreased. The left ventricular ejection fraction is 40%. Arrhythmia noted throughout much of the exam.   There is no evidence of mitral stenosis or prolapse appreciated. MV Ortiz score ~ 5. The area by planimetry is 5.3 cm². The mean trans mitral gradient is 1.6 mmHg. Suspect borderline severe (4+) mitral regurgitation with a posteriorly directed jet. MR PISA radius ~ 0.93 cm, MR ERO ~ 0.35 cm^2, MR regurgitant volume ~ 72 ml/beat, MR regurgitant fraction ~ 55%, MR vena contracta ~ 0.54 cm.  Mild calcification of the aortic valve is noted with adequate cuspal excursion.   Trace pulmonic regurgitation. Mild (1+) tricuspid regurgitation.    Peripheral Angiogram (10.17.22):  Underwent Successful CSI Atherectomy Balloon Angioplasty and Stenting of the Right SFA and CSI Atherectomy Balloon Angioplasty of Right Anterior Tibial Artery Using Pedal Approach.    Peripheral Angiogram (9.12.22):  Underwent Successful Left SFA Laser Atherectomy Balloon Angioplasty Using Left Radial Artery Approach.    Review of Systems   Constitutional: Positive for malaise/fatigue.   Cardiovascular:  Negative for chest pain and leg swelling.   Respiratory:  Negative for shortness of breath.    Skin:         Scrotal Wound   All other systems reviewed and are negative.    Objective:     Vital Signs (Most Recent):  Temp: 97.7 °F (36.5 °C) (03/25/24 1316)  Pulse: (!) 53 (03/25/24 1316)  Resp: 18 (03/25/24 1330)  BP: 100/65 (03/25/24 1316)  SpO2: 99 % (03/25/24 1224) Vital Signs (24h Range):  Temp:  [97.7 °F (36.5 °C)-99 °F (37.2 °C)] 97.7 °F (36.5 °C)  Pulse:  [53-87] 53  Resp:  [16-32] 18  SpO2:  [83 %-99 %] 99 %  BP: (100-140)/(56-91) 100/65   Weight: 89.8 kg (197 lb 15.6 oz)  Body mass index is 27.61 kg/m².  SpO2: 99 %       Intake/Output Summary (Last 24 hours) at 3/25/2024 1502  Last data filed at 3/25/2024 1351  Gross per 24 hour   Intake 640 ml   Output 2450 ml    Net -1810 ml       Lines/Drains/Airways       Drain  Duration                  Urethral Catheter 03/18/24 1426 Straight-tip 16 Fr. 7 days              Peripheral Intravenous Line  Duration                  Peripheral IV - Single Lumen 03/18/24 1930 20 G Distal;Posterior;Right Forearm 6 days         Peripheral IV - Single Lumen 03/24/24 1049 22 G Anterior;Left Upper Arm 1 day                  Significant Labs:   Recent Results (from the past 72 hour(s))   POCT glucose    Collection Time: 03/22/24  9:06 PM   Result Value Ref Range    POCT Glucose 117 (H) 70 - 110 mg/dL   Phosphorus    Collection Time: 03/23/24  1:22 AM   Result Value Ref Range    Phosphorus Level 2.9 2.3 - 4.7 mg/dL   Magnesium    Collection Time: 03/23/24  1:22 AM   Result Value Ref Range    Magnesium Level 2.40 1.60 - 2.60 mg/dL   Comprehensive metabolic panel    Collection Time: 03/23/24  1:22 AM   Result Value Ref Range    Sodium Level 143 136 - 145 mmol/L    Potassium Level 4.0 3.5 - 5.1 mmol/L    Chloride 106 98 - 107 mmol/L    Carbon Dioxide 25 23 - 31 mmol/L    Glucose Level 121 (H) 82 - 115 mg/dL    Blood Urea Nitrogen 11.6 8.4 - 25.7 mg/dL    Creatinine 1.08 0.73 - 1.18 mg/dL    Calcium Level Total 8.2 (L) 8.8 - 10.0 mg/dL    Protein Total 6.1 5.8 - 7.6 gm/dL    Albumin Level 2.2 (L) 3.4 - 4.8 g/dL    Globulin 3.9 (H) 2.4 - 3.5 gm/dL    Albumin/Globulin Ratio 0.6 (L) 1.1 - 2.0 ratio    Bilirubin Total 0.4 <=1.5 mg/dL    Alkaline Phosphatase 64 40 - 150 unit/L    Alanine Aminotransferase 9 0 - 55 unit/L    Aspartate Aminotransferase 21 5 - 34 unit/L    eGFR >60 mls/min/1.73/m2   Triglycerides    Collection Time: 03/23/24  1:22 AM   Result Value Ref Range    Triglyceride 61 34 - 140 mg/dL   CBC with Differential    Collection Time: 03/23/24  1:22 AM   Result Value Ref Range    WBC 8.72 4.50 - 11.50 x10(3)/mcL    RBC 2.75 (L) 4.70 - 6.10 x10(6)/mcL    Hgb 8.5 (L) 14.0 - 18.0 g/dL    Hct 26.2 (L) 42.0 - 52.0 %    MCV 95.3 (H) 80.0 - 94.0 fL     MCH 30.9 27.0 - 31.0 pg    MCHC 32.4 (L) 33.0 - 36.0 g/dL    RDW 16.8 11.5 - 17.0 %    Platelet 339 130 - 400 x10(3)/mcL    MPV 10.7 (H) 7.4 - 10.4 fL    Neut % 67.5 %    Lymph % 16.2 %    Mono % 10.4 %    Eos % 0.9 %    Basophil % 0.5 %    Lymph # 1.41 0.6 - 4.6 x10(3)/mcL    Neut # 5.89 2.1 - 9.2 x10(3)/mcL    Mono # 0.91 0.1 - 1.3 x10(3)/mcL    Eos # 0.08 0 - 0.9 x10(3)/mcL    Baso # 0.04 <=0.2 x10(3)/mcL    IG# 0.39 (H) 0 - 0.04 x10(3)/mcL    IG% 4.5 %    NRBC% 0.5 %   Triglycerides    Collection Time: 03/24/24  1:53 AM   Result Value Ref Range    Triglyceride 58 34 - 140 mg/dL   Comprehensive metabolic panel    Collection Time: 03/24/24  1:53 AM   Result Value Ref Range    Sodium Level 142 136 - 145 mmol/L    Potassium Level 4.0 3.5 - 5.1 mmol/L    Chloride 105 98 - 107 mmol/L    Carbon Dioxide 26 23 - 31 mmol/L    Glucose Level 99 82 - 115 mg/dL    Blood Urea Nitrogen 16.7 8.4 - 25.7 mg/dL    Creatinine 1.07 0.73 - 1.18 mg/dL    Calcium Level Total 8.1 (L) 8.8 - 10.0 mg/dL    Protein Total 5.4 (L) 5.8 - 7.6 gm/dL    Albumin Level 2.3 (L) 3.4 - 4.8 g/dL    Globulin 3.1 2.4 - 3.5 gm/dL    Albumin/Globulin Ratio 0.7 (L) 1.1 - 2.0 ratio    Bilirubin Total 0.5 <=1.5 mg/dL    Alkaline Phosphatase 64 40 - 150 unit/L    Alanine Aminotransferase 10 0 - 55 unit/L    Aspartate Aminotransferase 19 5 - 34 unit/L    eGFR >60 mls/min/1.73/m2   Magnesium    Collection Time: 03/24/24  1:53 AM   Result Value Ref Range    Magnesium Level 2.10 1.60 - 2.60 mg/dL   Phosphorus    Collection Time: 03/24/24  1:53 AM   Result Value Ref Range    Phosphorus Level 2.7 2.3 - 4.7 mg/dL   CBC with Differential    Collection Time: 03/24/24  1:53 AM   Result Value Ref Range    WBC 8.44 4.50 - 11.50 x10(3)/mcL    RBC 2.41 (L) 4.70 - 6.10 x10(6)/mcL    Hgb 7.3 (L) 14.0 - 18.0 g/dL    Hct 23.3 (L) 42.0 - 52.0 %    MCV 96.7 (H) 80.0 - 94.0 fL    MCH 30.3 27.0 - 31.0 pg    MCHC 31.3 (L) 33.0 - 36.0 g/dL    RDW 16.7 11.5 - 17.0 %    Platelet 378 130  - 400 x10(3)/mcL    MPV 9.9 7.4 - 10.4 fL    Neut % 70.0 %    Lymph % 17.5 %    Mono % 8.1 %    Eos % 0.9 %    Basophil % 0.4 %    Lymph # 1.48 0.6 - 4.6 x10(3)/mcL    Neut # 5.91 2.1 - 9.2 x10(3)/mcL    Mono # 0.68 0.1 - 1.3 x10(3)/mcL    Eos # 0.08 0 - 0.9 x10(3)/mcL    Baso # 0.03 <=0.2 x10(3)/mcL    IG# 0.26 (H) 0 - 0.04 x10(3)/mcL    IG% 3.1 %    NRBC% 0.2 %   Prepare RBC 1 Unit    Collection Time: 03/24/24  4:31 AM   Result Value Ref Range    UNIT NUMBER [id number]     UNIT ABO/RH A POS     DISPENSE STATUS Transfused     Unit Expiration [id number]     Product Code I9217E03     Unit Blood Type Code 6200     CROSSMATCH INTERPRETATION Compatible    Type & Screen    Collection Time: 03/24/24  4:31 AM   Result Value Ref Range    Group & Rh A POS     Indirect Shahab GEL NEG     Specimen Outdate 03/27/2024 23:59    Prepare RBC 1 Unit    Collection Time: 03/24/24  4:31 AM   Result Value Ref Range    UNIT NUMBER [id number]     UNIT ABO/RH A NEG     DISPENSE STATUS Issued     Unit Expiration [id number]     Product Code P7197Z57     Unit Blood Type Code 0600     CROSSMATCH INTERPRETATION Compatible    Hemoglobin and Hematocrit    Collection Time: 03/24/24  1:57 PM   Result Value Ref Range    Hgb 8.0 (L) 14.0 - 18.0 g/dL    Hct 25.1 (L) 42.0 - 52.0 %   Phosphorus    Collection Time: 03/25/24  2:41 AM   Result Value Ref Range    Phosphorus Level 3.3 2.3 - 4.7 mg/dL   Magnesium    Collection Time: 03/25/24  2:41 AM   Result Value Ref Range    Magnesium Level 2.10 1.60 - 2.60 mg/dL   Comprehensive metabolic panel    Collection Time: 03/25/24  2:41 AM   Result Value Ref Range    Sodium Level 145 136 - 145 mmol/L    Potassium Level 4.2 3.5 - 5.1 mmol/L    Chloride 106 98 - 107 mmol/L    Carbon Dioxide 26 23 - 31 mmol/L    Glucose Level 104 82 - 115 mg/dL    Blood Urea Nitrogen 16.5 8.4 - 25.7 mg/dL    Creatinine 1.29 (H) 0.73 - 1.18 mg/dL    Calcium Level Total 8.3 (L) 8.8 - 10.0 mg/dL    Protein Total 6.1 5.8 - 7.6  gm/dL    Albumin Level 2.4 (L) 3.4 - 4.8 g/dL    Globulin 3.7 (H) 2.4 - 3.5 gm/dL    Albumin/Globulin Ratio 0.6 (L) 1.1 - 2.0 ratio    Bilirubin Total 0.8 <=1.5 mg/dL    Alkaline Phosphatase 67 40 - 150 unit/L    Alanine Aminotransferase 9 0 - 55 unit/L    Aspartate Aminotransferase 19 5 - 34 unit/L    eGFR >60 mls/min/1.73/m2   Triglycerides    Collection Time: 03/25/24  2:41 AM   Result Value Ref Range    Triglyceride 64 34 - 140 mg/dL   CBC with Differential    Collection Time: 03/25/24  2:44 AM   Result Value Ref Range    WBC 7.87 4.50 - 11.50 x10(3)/mcL    RBC 2.78 (L) 4.70 - 6.10 x10(6)/mcL    Hgb 8.7 (L) 14.0 - 18.0 g/dL    Hct 26.2 (L) 42.0 - 52.0 %    MCV 94.2 (H) 80.0 - 94.0 fL    MCH 31.3 (H) 27.0 - 31.0 pg    MCHC 33.2 33.0 - 36.0 g/dL    RDW 17.2 (H) 11.5 - 17.0 %    Platelet 407 (H) 130 - 400 x10(3)/mcL    MPV 9.8 7.4 - 10.4 fL    Neut % 71.5 %    Lymph % 16.1 %    Mono % 9.3 %    Eos % 0.8 %    Basophil % 0.4 %    Lymph # 1.27 0.6 - 4.6 x10(3)/mcL    Neut # 5.63 2.1 - 9.2 x10(3)/mcL    Mono # 0.73 0.1 - 1.3 x10(3)/mcL    Eos # 0.06 0 - 0.9 x10(3)/mcL    Baso # 0.03 <=0.2 x10(3)/mcL    IG# 0.15 (H) 0 - 0.04 x10(3)/mcL    IG% 1.9 %    NRBC% 0.3 %   Uric Acid    Collection Time: 03/25/24  1:44 PM   Result Value Ref Range    Uric Acid 3.1 (L) 3.5 - 7.2 mg/dL     Telemetry: PAF/CVR    Physical Exam  Vitals reviewed.   Constitutional:       General: He is not in acute distress.     Appearance: Normal appearance. He is ill-appearing.   HENT:      Head: Normocephalic.      Mouth/Throat:      Mouth: Mucous membranes are dry.   Eyes:      Extraocular Movements: Extraocular movements intact.      Conjunctiva/sclera: Conjunctivae normal.   Cardiovascular:      Rate and Rhythm: Normal rate. Rhythm irregular.      Heart sounds: Murmur heard.   Pulmonary:      Effort: Pulmonary effort is normal. No respiratory distress.      Comments: NC O2  Genitourinary:     Comments: Scrotal Sling in Place, Dressing  C/D/I  Musculoskeletal:         General: No swelling.      Right lower leg: No edema.      Left lower leg: No edema.   Neurological:      General: No focal deficit present.      Mental Status: He is alert and oriented to person, place, and time.   Psychiatric:         Mood and Affect: Mood normal.         Behavior: Behavior normal.         Judgment: Judgment normal.       Current Inpatient Medications:    Current Facility-Administered Medications:     0.9%  NaCl infusion (for blood administration), , Intravenous, Q24H PRN, Carmen Griggs, FNP    0.9%  NaCl infusion (for blood administration), , Intravenous, Q24H PRN, Joshua Hernandez ANP    acetaminophen tablet 650 mg, 650 mg, Oral, Q8H PRN, Harris Hernandez MD    albuterol-ipratropium 2.5 mg-0.5 mg/3 mL nebulizer solution 3 mL, 3 mL, Nebulization, Once, Natali Seymour MD    albuterol-ipratropium 2.5 mg-0.5 mg/3 mL nebulizer solution 3 mL, 3 mL, Nebulization, Q4H, Natali Seymour MD, 3 mL at 03/25/24 1224    amoxicillin-clavulanate 875-125mg per tablet 1 tablet, 1 tablet, Oral, Q12H, Natali Seymour MD    atorvastatin tablet 80 mg, 80 mg, Oral, Daily, Cassidy Obrien MD, 80 mg at 03/25/24 0904    chlorhexidine 0.12 % solution 15 mL, 15 mL, Mouth/Throat, BID, David Gonsalez MD, 15 mL at 03/25/24 0904    dextrose 10 % infusion, , Intravenous, PRN, David Gonsalez MD    dextrose 10 % infusion, , Intravenous, PRN, David Gonsalez MD    dextrose 10% bolus 125 mL 125 mL, 12.5 g, Intravenous, PRN, David Gonsalez MD    dextrose 10% bolus 250 mL 250 mL, 25 g, Intravenous, PRN, David Gonsalez MD    enoxaparin injection 90 mg, 1 mg/kg, Subcutaneous, Q12H (prophylaxis, 0900/2100), Natali Seymour MD, 90 mg at 03/25/24 0904    folic acid tablet 1 mg, 1 mg, Oral, Daily, Jenna Miller MD, 1 mg at 03/25/24 0904    insulin aspart U-100 injection 0-10 Units, 0-10 Units, Subcutaneous, Q6H PRN, Narendra Pradhan,     ipratropium 0.02 % nebulizer solution 0.5  mg, 0.5 mg, Nebulization, Q6H PRN, David Gonsalez MD    LIDOcaine (PF) 10 mg/ml (1%) injection 10 mg, 1 mL, Intradermal, Once, Maxi Jordan DO    LORazepam tablet 2 mg, 2 mg, Oral, Q4H PRN, Jenna Miller MD, 2 mg at 03/21/24 0031    melatonin tablet 6 mg, 6 mg, Oral, Nightly PRN, Harris Hernandez MD, 6 mg at 03/24/24 2200    midodrine tablet 10 mg, 10 mg, Oral, TID WM, Narendra Pradhan, , 10 mg at 03/25/24 1153    morphine injection 2 mg, 2 mg, Intravenous, Q6H PRN, David Gonsalez MD, 2 mg at 03/24/24 1601    multivitamin tablet, 1 tablet, Oral, Daily, Jenna Miller MD, 1 tablet at 03/25/24 0904    mupirocin 2 % ointment, , Nasal, BID, Cassidy Obrien MD, Given at 03/25/24 0904    oxyCODONE immediate release tablet 5 mg, 5 mg, Oral, Q4H PRN, Harris Hernandez MD, 5 mg at 03/25/24 1330    pantoprazole injection 40 mg, 40 mg, Intravenous, BID, Cassidy Obrien MD, 40 mg at 03/25/24 0904    scopolamine 1.3-1.5 mg (1 mg over 3 days) 1 patch, 1 patch, Transdermal, Q3 Days, Joe Clarke MD, 1 patch at 03/23/24 1502    sodium chloride 0.9% flush 10 mL, 10 mL, Intravenous, PRN, Narendra Pradhan DO    sodium chloride 0.9% flush 10 mL, 10 mL, Intravenous, PRN, David Gonsalez MD    sucralfate tablet 1 g, 1 g, Oral, QID (AC & HS), Jenna Miller MD, 1 g at 03/25/24 1153    thiamine (B-1) 500 mg in dextrose 5 % (D5W) 100 mL IVPB, 500 mg, Intravenous, TID, David Gonsalez MD, Last Rate: 200 mL/hr at 03/25/24 1423, 500 mg at 03/25/24 1423    Facility-Administered Medications Ordered in Other Encounters:     0.9%  NaCl infusion, , Intravenous, Continuous, Shannon Milligan MD, Last Rate: 10 mL/hr at 03/09/23 1020, New Bag at 03/09/23 1020    diphenhydrAMINE injection 25 mg, 25 mg, Intravenous, Once PRN, Shannon Milligan MD    LIDOcaine (PF) 10 mg/ml (1%) injection 10 mg, 1 mL, Intradermal, Once, Lanette Ulloa FNP    LIDOcaine (PF) 10 mg/ml (1%) injection 10 mg, 1 mL,  Intradermal, Once, Shannon Milligan MD    ondansetron injection 4 mg, 4 mg, Intravenous, Once, Shannon Milligan MD    prochlorperazine injection Soln 5 mg, 5 mg, Intravenous, Once PRN, Shannon Milligan MD  VTE Risk Mitigation (From admission, onward)           Ordered     enoxaparin injection 90 mg  Every 12 hours         03/24/24 0901     IP VTE LOW RISK PATIENT  Once         03/19/24 0422     Place sequential compression device  Until discontinued         03/18/24 0124                  Assessment:   Cardiac Arrest/VT (Post Operative Left Héctor Orchiectomy - in PACU 3.18.24)     - Requiring Multiple Defibrillations (x 3)    - On Amiodarone Infusion  Anemia - Improving     - Requiring Transfusion (3.24.24) - H&H 7.3/23.3    - EGD (3.22.24) - LA Grade B Reflux Esophagitis with No Bleeding, Chronic Gastritis, Protonix; Colonoscopy as OP 2/2 Scrotal Wound  Chronic Atrial Fibrillation- Now AF CVR (HR 50's-60s with Intermittent PVCS)    - Xarelto on Hold Post Scrotal Abscess I&D  NSTEMI (Unspecified for Now)  Acute Hypoxemic Respiratory Failure requiring Intubation/Ventilation - Now Extubated on NC O2  Hypotension Requiring Vasopressor Support - Resolved     - History of Hypertension  Valvular Heart Disease    - MR: Moderate, TR: Mild to Moderate  Hyperlipidemia    - On Statin  Sepsis - Likely UTI Related   Scrotal Abscess    - Status Post Surgical Exploration, Left Orchiectomy, & Debridement of Wound/Wound Packing (3.18.24)   COPD  Partial Bowel Obstruction   Long Term Oral Anticoagulation  Cardiomyopathy (Unspecified)    - EF 30-40%  CAD (Details Unclear)  PAD  Infrarenal Abdominal Aortic Aneurysmal Dilatation with Mural Thrombus (Stable)    - Maximum diameter is 3.6 cm without significant interval change   Acute Human Metapneumovirus Infection (On Droplet Precautions)    Plan:   Continue Oral Amiodarone Load - No Further Episodes of VT  GI Cleared PT for DAPT/AC   Transfuse 1 Unit PRBCs   Keep K > 4.0 and Mg >  2.0   Antibiotic Management as per Primary Team  Consider Ischemic Evaluation once acute medical issues stabilize, and cleared for anticoagulation by GI Team (s/p EGD - Awaiting GI Recommendations)  NPO after MN  Will Continue to Follow  Labs in AM: CBC, CMP and Mg    RAN Kim  Cardiology  [facility]  03/25/2024     I agree with the findings of the complexity of problems addressed and take responsibility for the plan's risks and complications. I approved the plan documented by Joshua Hernandez NP.

## 2024-03-25 NOTE — NURSING
Nurses Note -- 4 Eyes      3/25/2024   3:31 PM      Skin assessed during: Transfer      [x] No Altered Skin Integrity Present    [x]Prevention Measures Documented      [] Yes- Altered Skin Integrity Present or Discovered   [] LDA Added if Not in Epic (Describe Wound)   [] New Altered Skin Integrity was Present on Admit and Documented in LDA   [] Wound Image Taken    Wound Care Consulted? No    Attending Nurse:  Brittany BRAGG    Second RN/Staff Member:   Jackelyn BRAGG

## 2024-03-25 NOTE — PROGRESS NOTES
UROLOGY FOLLOW-UP PROGRESS NOTE      SUBJECTIVE  Ran Reyes Jr. is a 68 y.o. year old male hospital day 7 with   Chief Complaint   Patient presents with    Rectal Bleeding     Rectal bleeding that started today, reports bright red blood when coughing. Reports left lower quadrant abdominal pain. Hx of aortic aneurysm. +BT        No isses over night    VITAL SIGNS: 24 HR MIN & MAX LAST    Temp  Min: 97.9 °F (36.6 °C)  Max: 98.1 °F (36.7 °C)  98.1 °F (36.7 °C)        BP  Min: 107/59  Max: 140/56  124/88     Pulse  Min: 56  Max: 87  70     Resp  Min: 15  Max: 32  20    SpO2  Min: 83 %  Max: 99 %  96 %      Vitals:    03/25/24 0600 03/25/24 0618 03/25/24 0700 03/25/24 0804   BP: 134/85  124/88    Pulse: 70 73 71 70   Resp: (!) 28 (!) 23 (!) 24 20   Temp:       TempSrc:       SpO2:  (!) 94% (!) 94% 96%   Weight:       Height:           Intake/Output:    Intake/Output Summary (Last 24 hours) at 3/25/2024 0856  Last data filed at 3/25/2024 0618  Gross per 24 hour   Intake 480 ml   Output 2151 ml   Net -1671 ml    I/O last 3 completed shifts:  In: 720 [P.O.:720]  Out: 2751 [Urine:2750; Stool:1] Diet NPO Except for: Medication     [unfilled]      IMAGING  na    PHYSICAL EXAMINATION    Constitutional:  Well developed, well nourished, no acute distress, non-toxic appearance   Respiratory:  Non-labored, no wheezing, clear  Cardiac:  RRR, no murmurs rubs gallops  Abdomen:   sfot  :  wound is healing well  Extremities:  No clubbing, cyanosis or edema.  TEDS and SCDs applied      ASSESSMENT  Hospital Day :7  Active Hospital Problems    Diagnosis  POA    *Scrotal hematoma [S30.22XA]  Yes      Resolved Hospital Problems   No resolved problems to display.       Wound is healing appropriately; continue wound care.  Recommend to swtich to oral abx for another week    F/u with Dr. Rivas who did his surgery

## 2024-03-25 NOTE — PT/OT/SLP PROGRESS
Physical Therapy Treatment    Patient Name:  Ran Reyes Jr.   MRN:  [id number]    Recommendations:     Discharge therapy intensity: Moderate Intensity Therapy   Discharge Equipment Recommendations: to be determined by next level of care  Barriers to discharge:  medical dx, impaired mobility, decreased caregiver/family support    Assessment:     Ran Reyes Jr. is a 68 y.o. male admitted with a medical diagnosis of testicular swelling and bleeding; L scrotal abscess; sepsis; UTI; s/p orchiectomy, joselito scrotal exploration, debridement; post op vfib arrest; Human Metapneumovirus.    He presents with the following impairments/functional limitations: weakness, gait instability, decreased upper extremity function, impaired endurance, impaired balance, decreased lower extremity function, impaired cardiopulmonary response to activity, impaired functional mobility, impaired self care skills.    Rehab Prognosis: Good; patient would benefit from acute skilled PT services to address these deficits and reach maximum level of function.    Recent Surgery: Procedure(s) (LRB):  EGD (N/A)  EGD, WITH CLOSED BIOPSY 3 Days Post-Op    Plan:     During this hospitalization, patient to be seen 5 x/week to address the identified rehab impairments via gait training, therapeutic activities, therapeutic exercises and progress toward the following goals:    Plan of Care Expires:  04/21/24    Subjective     Chief Complaint: n/a  Patient/Family Comments/goals: to get stronger   Pain/Comfort:  Pain Rating 1: 2/10  Location - Side 1: Left  Location 1: groin      Objective:     Communicated with NSG prior to session.  Patient found up in chair with blood pressure cuff, telemetry, pulse ox (continuous), oxygen, booth catheter, peripheral IV upon PT entry to room.     General Precautions: Standard, fall  Orthopedic Precautions: N/A  Braces: N/A  Respiratory Status:  2L oxymask  SpO2: 93%  HR: 66    Skin Integrity: Visible skin  intact      Functional Mobility:  Transfers:     Sit to Stand:  minimum assistance with rolling walker  Gait: pt amb approx 140 ft with use of RW and min-CGA; decreased susie; step through pattern; 1 standing rest break; no major LOB      Education:  Patient provided with verbal education  regarding PT role/goals/POC, fall prevention, safety awareness, and discharge/DME recommendations.  Understanding was verbalized.     Patient left up in chair with all lines intact, call button in reach, and RN notified    GOALS:   Multidisciplinary Problems       Physical Therapy Goals          Problem: Physical Therapy    Goal Priority Disciplines Outcome Goal Variances Interventions   Physical Therapy Goal     PT, PT/OT Ongoing, Progressing     Description: Goals to be met by: 24     Patient will increase functional independence with mobility by performin. Supine to sit with MInimal Assistance  2. Sit to supine with MInimal Assistance  3. Sit to stand transfer with Stand-by Assistance  4. Gait  x 150 feet with Stand-by Assistance using Rolling Walker.                          Time Tracking:     PT Received On: 24  PT Start Time: 0946     PT Stop Time: 1001  PT Total Time (min): 15 min     Billable Minutes: Gait Training 1    Treatment Type: Treatment  PT/PTA: PT     Number of PTA visits since last PT visit: 2     2024

## 2024-03-25 NOTE — PT/OT/SLP PROGRESS
"Occupational Therapy   Treatment    Name: Ran Reyes Jr.  MRN: [id number]  Admitting Diagnosis:  Scrotal hematoma  3 Days Post-Op    Recommendations:     Recommended therapy intensity at discharge: Moderate Intensity Therapy (pt with limited daily support per his report)   Discharge Equipment Recommendations:  to be determined by next level of care  Barriers to discharge:       Assessment:     Rna Reyes Jr. is a 68 y.o. male with a medical diagnosis of scrotal abscess s/p I&D L joselito orchiectomy, UTI, sepsis, afib RVR, FOBT, recent cardiac arrest.  He presents with improved endurance and ability to ambulate with RW.  Tremors remain but much improved in BUE.  Pending cardiac workup, likely tomorrow.  Performance deficits affecting function are weakness, impaired self care skills, impaired functional mobility, gait instability, decreased upper extremity function, decreased lower extremity function, decreased safety awareness, impaired skin, impaired endurance, impaired balance.     Rehab Prognosis:  Good; patient would benefit from acute skilled OT services to address these deficits and reach maximum level of function.       Plan:     Patient to be seen 5 x/week to address the above listed problems via self-care/home management, therapeutic exercises  Plan of Care Expires: 04/21/24  Plan of Care Reviewed with: patient    Subjective     Pain/Comfort:  Pain Rating 1:  (2/10 "near my groin" at rest, increased pain with ADLs such as trying to don socks, will introduce AE)  Pain Addressed 1: Cessation of Activity    Objective:     Communicated with: MAHSA Gonzalez prior to session.  Patient found up in chair with  (vital monitoring, IV, booth) upon OT entry to room.    General Precautions: Standard, fall    Orthopedic Precautions:N/A  Braces: N/A  Respiratory Status:  2L oxymask 98/70  BP       Occupational Performance:       Functional Mobility/Transfers:  Patient completed Sit <> Stand Transfer with contact guard " assistance  with  rolling walker   Functional Mobility: min progressing towards CGA with RW around room, to sink    Activities of Daily Living:  Grooming: contact guard assistance oral hygiene at sink  Lower Body Dressing: maximal assistance min assist to doff R sock, unable to complete donning R sock due to pain in scrotal region.  Will introduce hip kit in upcoming sessions.      Patient Education:  Patient provided with verbal education education regarding OT role/goals/POC.  Understanding was verbalized, however additional teaching warranted.      Patient left up in chair with all lines intact and nursing staff present for downgrade.    GOALS:   Multidisciplinary Problems       Occupational Therapy Goals          Problem: Occupational Therapy    Goal Priority Disciplines Outcome Interventions   Occupational Therapy Goal     OT, PT/OT Ongoing, Progressing    Description: Goals to be met by: 4/21/24     Patient will increase functional independence with ADLs by performing:    UE Dressing with Stand-by Assistance.  LE Dressing with Min A  Grooming while standing at sink with Contact Guard Assistance.  Toileting from toilet with Contact Guard Assistance for hygiene and clothing management.   Toilet transfer to toilet with Contact Guard Assistance.  Increased functional strength to 4/5 through therEx.                         Time Tracking:     OT Date of Treatment:    OT Start Time: 0943  OT Stop Time: 1001  OT Total Time (min): 18 min    Billable Minutes:Self Care/Home Management 1    OT/ALLAN: OT     Number of ALLAN visits since last OT visit: 1    3/25/2024

## 2024-03-25 NOTE — PROGRESS NOTES
[facility] Medicine Progress Note        Chief Complaint: Inpatient Follow-up for bleeding    HPI: Patient is a 68-year-old male with a history of nonischemic cardiomyopathy, systolic heart failure with an EF of 41%, VHD, chronic AFib on Xarelto, peripheral arterial disease, COPD, HTN, and a large left testicle hydrocele who presents to the ER complaining of what he thought was rectal bleeding as he was noting blood in the toilet and running down his legs.  Ultimately was found that the bleeding was coming from the posterior aspect of his scrotum from a superficial scrotal ulceration.  He also complained of persistent testicular swelling and pain.Doppler ultrasound was significantly limited, but could not rule out the presence of torsion.Urology was consulted and they made decision to proceed with emergent scrotal exploration.  Patient underwent surgery, seemed to tolerate well.  In the PACU, patient was persistently hypotensive with blood pressures staying approximately 80/50 despite fluid resuscitation. Upgraded to ICU for vasopressor support.Went into cardiac arrest with ventricular tachycardia requiring defibrillation x3 (03/18/2024).Remains on Antibiotics.Now off of pressors.Being downgraded to floors.FOBT pos. GI planned for EGD. [facility] planning for Ischemic evaluation. Consultants cleared for anticoagulants. Now  on FD lovenox.Underwent EGD, found to have grade B reflux esophagitis, chronic gastritis, chronic duodenitis.  May need outpatient colonoscopy, recommended Carafate for 7 days commenting dicyclomine or hyoscyamine q.6 p.r.n. for abdominal pain, Protonix 40 IV b.i.d. while inpatient, transition to p.o. for a month on discharge and then daily for 3 months.  Cardiology on board, awaiting ischemic evaluation.  Urology continues to follow.  PT recommending placement    Interval Hx:   Patient was wheezy overnight, required small dose of Lasix.  Added DuoNebs.  He  eventually improved.  Was seen by GI again again there was a drop in hemoglobin noted, received a unit of blood.  GI okay and anticoagulation.  Cardiology on board, awaiting ischemic evaluation.  Urology following.    Patient was seen and examined at bedside, has some mild discomfort around scrotum, denies any fevers or chills, denies any chest pain or difficulty breathing .    Chart was reviewed, afebrile, hemodynamically stable, most recent labs reviewed hemoglobin-8.7, no leukocytosis.  Slight elevation in creatinine    Objective/physical exam:  General: In no acute distress, afebrile  Chest:  Rhonchi.  On nasal cannula  Heart:  +S1, S2  Abdomen: Soft, nontender, BS +  MSK: Warm, no lower extremity edema, no clubbing or cyanosis  :  Scrotal wound with dressing  Neurologic: Alert and oriented x4, able to move all extremities    VITAL SIGNS: 24 HRS MIN & MAX LAST   Temp  Min: 97.9 °F (36.6 °C)  Max: 99 °F (37.2 °C) 99 °F (37.2 °C)   BP  Min: 107/59  Max: 140/72 112/85   Pulse  Min: 56  Max: 87  86   Resp  Min: 16  Max: 32 (!) 26   SpO2  Min: 83 %  Max: 99 % (!) 94 %     I have reviewed the following labs:  Recent Labs   Lab 03/23/24  0122 03/24/24  0153 03/24/24  1357 03/25/24  0244   WBC 8.72 8.44  --  7.87   RBC 2.75* 2.41*  --  2.78*   HGB 8.5* 7.3* 8.0* 8.7*   HCT 26.2* 23.3* 25.1* 26.2*   MCV 95.3* 96.7*  --  94.2*   MCH 30.9 30.3  --  31.3*   MCHC 32.4* 31.3*  --  33.2   RDW 16.8 16.7  --  17.2*    378  --  407*   MPV 10.7* 9.9  --  9.8     Recent Labs   Lab 03/19/24  0540 03/19/24  1742 03/20/24  0539 03/20/24  1939 03/21/24  0219 03/23/24  0122 03/24/24  0153 03/25/24  0241   NA  --    < >  --   --    < > 143 142 145   K  --    < >  --   --    < > 4.0 4.0 4.2   CO2  --    < >  --   --    < > 25 26 26   BUN  --    < >  --   --    < > 11.6 16.7 16.5   CREATININE  --    < >  --   --    < > 1.08 1.07 1.29*   CALCIUM  --    < >  --   --    < > 8.2* 8.1* 8.3*   PH 7.440  --  7.510* 7.500*  --   --   --    --    MG  --    < >  --   --    < > 2.40 2.10 2.10   ALBUMIN  --    < >  --   --    < > 2.2* 2.3* 2.4*   ALKPHOS  --    < >  --   --    < > 64 64 67   ALT  --    < >  --   --    < > 9 10 9   AST  --    < >  --   --    < > 21 19 19   BILITOT  --    < >  --   --    < > 0.4 0.5 0.8    < > = values in this interval not displayed.     Microbiology Results (last 7 days)       Procedure Component Value Units Date/Time    Blood Culture [[id number]]  (Normal) Collected: 03/18/24 2226    Order Status: Completed Specimen: Blood, Venous Updated: 03/23/24 2300     CULTURE, BLOOD ([facility]) No Growth at 5 days    Blood culture #2 **CANNOT BE ORDERED STAT** [[id number]]  (Normal) Collected: 03/17/24 2103    Order Status: Completed Specimen: Blood Updated: 03/22/24 2200     CULTURE, BLOOD ([facility]) No Growth at 5 days    Blood culture #1 **CANNOT BE ORDERED STAT** [[id number]]  (Normal) Collected: 03/17/24 2103    Order Status: Completed Specimen: Blood Updated: 03/22/24 2200     CULTURE, BLOOD ([facility]) No Growth at 5 days    Respiratory Culture [[id number]]  (Abnormal) Collected: 03/20/24 0940    Order Status: Completed Specimen: Sputum, Expectorated Updated: 03/22/24 0912     Respiratory Culture Moderate Yeast     Comment: with no normal respiratory jt        GRAM STAIN Quality 2+      No bacteria seen    Wound Culture [[id number]]  (Abnormal)  (Susceptibility) Collected: 03/18/24 1502    Order Status: Completed Specimen: Abscess from Scrotum Updated: 03/21/24 1306     Wound Culture Many Escherichia coli      Many Streptococcus agalactiae (Group B)    Anaerobic Culture [[id number]] Collected: 03/18/24 1502    Order Status: Completed Specimen: Abscess from Scrotum Updated: 03/21/24 0941     Anaerobe Culture No Anaerobes Isolated    Urine culture [[id number]]  (Abnormal) Collected: 03/18/24 0034    Order Status: Completed Specimen: Urine Updated: 03/19/24 1004     Urine Culture No other significant growth      10,000 - 25,000 colonies/ml  Yeast species     Comment: We have not further evaluated these organisms because three or more species compatible with normal genital jt usually represents specimen contamination from the time of collection, rather than infection. If clinical circumstances warrant further   workup of these organisms, please contact the Microbiology dept at [phone]; otherwise please have the patient submit another specimen.       Chlamydia/GC, PCR [[id number]] Collected: 03/19/24 0128    Order Status: Completed Specimen: Urine Updated: 03/19/24 0527     Chlamydia trachomatis PCR Not Detected     N. gonorrhea PCR Not Detected     Source Urine    Narrative:      The Xpert CT/NG test, performed on the [employer] system is a qualitative in vitro real-time polymerase chain reaction (PCR) test for the automated detected and differentiation for genomic DNA from Chlamydia trachomatis (CT) and/or Neisseria gonorrhoeae (NG).    STD Urine (CT/GC/Trich) [[id number]] Collected: 03/19/24 0128    Order Status: Canceled Specimen: Urine Updated: 03/19/24 0138             See below for Radiology    Scheduled Med:   albuterol-ipratropium  3 mL Nebulization Once    albuterol-ipratropium  3 mL Nebulization Q4H    atorvastatin  80 mg Oral Daily    chlorhexidine  15 mL Mouth/Throat BID    enoxparin  1 mg/kg Subcutaneous Q12H (prophylaxis, 0900/2100)    folic acid  1 mg Oral Daily    LIDOcaine (PF) 10 mg/ml (1%)  1 mL Intradermal Once    midodrine  10 mg Oral TID WM    multivitamin  1 tablet Oral Daily    mupirocin   Nasal BID    pantoprazole  40 mg Intravenous BID    piperacillin-tazobactam (Zosyn) IV (PEDS and ADULTS) (extended infusion is not appropriate)  4.5 g Intravenous Q8H    scopolamine  1 patch Transdermal Q3 Days    sucralfate  1 g Oral QID (AC & HS)    thiamine (B-1) 500 mg in dextrose 5 % (D5W) 100 mL IVPB  500 mg Intravenous TID      Continuous Infusions:       PRN Meds:  0.9%  NaCl infusion (for blood administration), 0.9%  NaCl infusion  (for blood administration), acetaminophen, dextrose 10 % in water (D10W), dextrose 10 % in water (D10W), dextrose 10%, dextrose 10%, insulin aspart U-100, ipratropium, LORazepam, melatonin, morphine, oxyCODONE, sodium chloride 0.9%, sodium chloride 0.9%     Assessment/Plan:  Scrotal abscess s/p I&D with left joselito orchiectomy 03/18/2024  UTI  Abnormal Chest Imaging with Rt lower lobe Infiltrate, concern Post Viral Pneumonia.-improving  Human Metapneumovirus +ve  Acute hypoxic respiratory failure from above  Sepsis from above requiring vasopressor supoort  Recent cardiac arrest with ventricular tachycardia requiring defibrillation x3 (03/18/2024)  AFib RVR   NSTMI, unspecified  Partial bowel obstruction  Normocytic anemia.    FOBT positive.Grade B reflux esophagitis, chronic gastritis, chronic duodenitis.    Electrolyte derangements   Abnormal CTA-abdominal aortic aneurysmal dilatation  with mural thrombus  ELIZABETH      PMH of  cardiomyopathy, systolic heart failure with an EF of 41%, VHD, chronic AFib on Xarelto, peripheral arterial disease, COPD, HTN, and a large left testicle hydrocele         Plan   Urology on board, continued with dressing changes, Continue the current antibiotics-on Vancomycin and Zosyn.  Monitor fever curve and WBC.  On Vancomycin 3/17 and Zosyn 3/17.  Reviewed microbiology data.  .Blood cultures so far negative.  Sputum with yeast.We will transition to oral antibiotics based on sensitivities, may need for another week, swiched to Augmentin 3/25  Wean oxygen. Bronchodilators prn.  Has history of COPD  Cardiology following, Monitor on telemetry, monitor and replete electrolytes, on amiodarone.  May need ischemic evaluation  , timing to be determined.  On Lovenox full dose b.i.d.  GI followed, underwent endoscopy, found gastritis, duodenitis, reflux esophagitis.  Okay for anticoagulation.  May need outpatient colonoscopy, recommended Carafate for 7 days commenting dicyclomine or hyoscyamine q.6  p.r.n. for abdominal pain, Protonix 40 IV b.i.d. while inpatient, transition to p.o. for a month on discharge and then daily for 3 months.  Monitor H&H  Slight elevation in creatinine noted today. Strict Is&Os.  Monitor volume status.  Follow up urine studies, creatinine, avoid nephrotoxins   PTOT -moderate intensity.  Continue supportive care.          Critical care Time Spent>35 min  Sepsis on IV antibiotics, Acute hypoxic respiratory failure requiring supplemental oxygen    VTE prophylaxis:  Lovenox full dose      Anticipated discharge and Disposition:   To be decided, pending ischemic evaluation by Cardiology, urology clearance, , improvement in creatinine, would need placement, may need home oxygen if unable to wean, has history of COPD. Needs outpatient colonoscopy.      All diagnosis and differential diagnosis have been reviewed; assessment and plan has been documented; I have personally reviewed the labs and test results that are presently available; I have reviewed the patients medication list; I have reviewed the consulting providers response and recommendations. I have reviewed or attempted to review medical records based upon their availability    All of the patient's questions have been  addressed and answered. Patient's is agreeable to the above stated plan. I will continue to monitor closely and make adjustments to medical management as needed.  _____________________________________________________________________    Nutrition Status:    Radiology:  I have personally reviewed the following imaging and agree with the radiologist.     X-Ray Chest 1 View  Narrative: EXAMINATION:  XR CHEST 1 VIEW    CLINICAL HISTORY:  hypoxia;    TECHNIQUE:  AP chest    COMPARISON:  Chest x-ray dated 03/20/2024    FINDINGS:  Endotracheal tube, enteric tube and right-sided central line have been removed.  There is stable cardiomegaly.  There is improved aeration of the lungs.  There is no pleural effusion or visible  pneumothorax.  Impression: Interval removal of lines and tubes.  No acute abnormality of the chest.    Electronically signed by: Danni Johnson  Date:    03/24/2024  Time:    12:56      Natali Seymour MD  Department of Hospital Medicine   [facility]   03/25/2024

## 2024-03-26 LAB
ALBUMIN SERPL-MCNC: 2.4 G/DL (ref 3.4–4.8)
ALBUMIN/GLOB SERPL: 0.7 RATIO (ref 1.1–2)
ALP SERPL-CCNC: 67 UNIT/L (ref 40–150)
ALT SERPL-CCNC: 10 UNIT/L (ref 0–55)
AST SERPL-CCNC: 18 UNIT/L (ref 5–34)
BASOPHILS # BLD AUTO: 0.04 X10(3)/MCL
BASOPHILS NFR BLD AUTO: 0.5 %
BILIRUB SERPL-MCNC: 0.9 MG/DL
BUN SERPL-MCNC: 12.4 MG/DL (ref 8.4–25.7)
CALCIUM SERPL-MCNC: 8.1 MG/DL (ref 8.8–10)
CHLORIDE SERPL-SCNC: 107 MMOL/L (ref 98–107)
CO2 SERPL-SCNC: 26 MMOL/L (ref 23–31)
CREAT SERPL-MCNC: 1.14 MG/DL (ref 0.73–1.18)
EOSINOPHIL # BLD AUTO: 0.08 X10(3)/MCL (ref 0–0.9)
EOSINOPHIL NFR BLD AUTO: 1 %
ERYTHROCYTE [DISTWIDTH] IN BLOOD BY AUTOMATED COUNT: 16.8 % (ref 11.5–17)
GFR SERPLBLD CREATININE-BSD FMLA CKD-EPI: >60 MLS/MIN/1.73/M2
GLOBULIN SER-MCNC: 3.4 GM/DL (ref 2.4–3.5)
GLUCOSE SERPL-MCNC: 89 MG/DL (ref 82–115)
HCT VFR BLD AUTO: 26.8 % (ref 42–52)
HGB BLD-MCNC: 8.5 G/DL (ref 14–18)
IMM GRANULOCYTES # BLD AUTO: 0.07 X10(3)/MCL (ref 0–0.04)
IMM GRANULOCYTES NFR BLD AUTO: 0.9 %
LYMPHOCYTES # BLD AUTO: 1.84 X10(3)/MCL (ref 0.6–4.6)
LYMPHOCYTES NFR BLD AUTO: 24.1 %
MAGNESIUM SERPL-MCNC: 2 MG/DL (ref 1.6–2.6)
MCH RBC QN AUTO: 30.1 PG (ref 27–31)
MCHC RBC AUTO-ENTMCNC: 31.7 G/DL (ref 33–36)
MCV RBC AUTO: 95 FL (ref 80–94)
MONOCYTES # BLD AUTO: 0.52 X10(3)/MCL (ref 0.1–1.3)
MONOCYTES NFR BLD AUTO: 6.8 %
NEUTROPHILS # BLD AUTO: 5.09 X10(3)/MCL (ref 2.1–9.2)
NEUTROPHILS NFR BLD AUTO: 66.7 %
NRBC BLD AUTO-RTO: 0 %
OHS QRS DURATION: 96 MS
OHS QTC CALCULATION: 503 MS
PHOSPHATE SERPL-MCNC: 3 MG/DL (ref 2.3–4.7)
PLATELET # BLD AUTO: 381 X10(3)/MCL (ref 130–400)
PMV BLD AUTO: 9.6 FL (ref 7.4–10.4)
POTASSIUM SERPL-SCNC: 4 MMOL/L (ref 3.5–5.1)
PROT SERPL-MCNC: 5.8 GM/DL (ref 5.8–7.6)
RBC # BLD AUTO: 2.82 X10(6)/MCL (ref 4.7–6.1)
SODIUM SERPL-SCNC: 142 MMOL/L (ref 136–145)
TRIGL SERPL-MCNC: 58 MG/DL (ref 34–140)
WBC # SPEC AUTO: 7.64 X10(3)/MCL (ref 4.5–11.5)

## 2024-03-26 PROCEDURE — 99900031 HC PATIENT EDUCATION (STAT)

## 2024-03-26 PROCEDURE — 25000003 PHARM REV CODE 250: Performed by: INTERNAL MEDICINE

## 2024-03-26 PROCEDURE — 93454 CORONARY ARTERY ANGIO S&I: CPT | Performed by: INTERNAL MEDICINE

## 2024-03-26 PROCEDURE — 63600175 PHARM REV CODE 636 W HCPCS: Performed by: INTERNAL MEDICINE

## 2024-03-26 PROCEDURE — 4A023N7 MEASUREMENT OF CARDIAC SAMPLING AND PRESSURE, LEFT HEART, PERCUTANEOUS APPROACH: ICD-10-PCS | Performed by: INTERNAL MEDICINE

## 2024-03-26 PROCEDURE — 25000003 PHARM REV CODE 250: Performed by: STUDENT IN AN ORGANIZED HEALTH CARE EDUCATION/TRAINING PROGRAM

## 2024-03-26 PROCEDURE — C9113 INJ PANTOPRAZOLE SODIUM, VIA: HCPCS | Performed by: INTERNAL MEDICINE

## 2024-03-26 PROCEDURE — C1894 INTRO/SHEATH, NON-LASER: HCPCS | Performed by: INTERNAL MEDICINE

## 2024-03-26 PROCEDURE — 21400001 HC TELEMETRY ROOM

## 2024-03-26 PROCEDURE — 97116 GAIT TRAINING THERAPY: CPT | Mod: CQ

## 2024-03-26 PROCEDURE — 99152 MOD SED SAME PHYS/QHP 5/>YRS: CPT | Performed by: INTERNAL MEDICINE

## 2024-03-26 PROCEDURE — 25000242 PHARM REV CODE 250 ALT 637 W/ HCPCS: Performed by: INTERNAL MEDICINE

## 2024-03-26 PROCEDURE — 63600175 PHARM REV CODE 636 W HCPCS

## 2024-03-26 PROCEDURE — 99900035 HC TECH TIME PER 15 MIN (STAT)

## 2024-03-26 PROCEDURE — 94640 AIRWAY INHALATION TREATMENT: CPT

## 2024-03-26 PROCEDURE — 94761 N-INVAS EAR/PLS OXIMETRY MLT: CPT

## 2024-03-26 PROCEDURE — 85025 COMPLETE CBC W/AUTO DIFF WBC: CPT

## 2024-03-26 PROCEDURE — C1887 CATHETER, GUIDING: HCPCS | Performed by: INTERNAL MEDICINE

## 2024-03-26 PROCEDURE — 25500020 PHARM REV CODE 255: Performed by: INTERNAL MEDICINE

## 2024-03-26 PROCEDURE — 93010 ELECTROCARDIOGRAM REPORT: CPT | Mod: ,,, | Performed by: INTERNAL MEDICINE

## 2024-03-26 PROCEDURE — 93005 ELECTROCARDIOGRAM TRACING: CPT

## 2024-03-26 PROCEDURE — 84478 ASSAY OF TRIGLYCERIDES: CPT

## 2024-03-26 PROCEDURE — 97530 THERAPEUTIC ACTIVITIES: CPT | Mod: CQ

## 2024-03-26 PROCEDURE — 80053 COMPREHEN METABOLIC PANEL: CPT

## 2024-03-26 PROCEDURE — C1769 GUIDE WIRE: HCPCS | Performed by: INTERNAL MEDICINE

## 2024-03-26 PROCEDURE — 25000003 PHARM REV CODE 250

## 2024-03-26 PROCEDURE — 83735 ASSAY OF MAGNESIUM: CPT

## 2024-03-26 PROCEDURE — 97535 SELF CARE MNGMENT TRAINING: CPT | Mod: CO

## 2024-03-26 PROCEDURE — B211YZZ FLUOROSCOPY OF MULTIPLE CORONARY ARTERIES USING OTHER CONTRAST: ICD-10-PCS | Performed by: INTERNAL MEDICINE

## 2024-03-26 PROCEDURE — 27000221 HC OXYGEN, UP TO 24 HOURS

## 2024-03-26 PROCEDURE — 11000001 HC ACUTE MED/SURG PRIVATE ROOM

## 2024-03-26 PROCEDURE — 94760 N-INVAS EAR/PLS OXIMETRY 1: CPT

## 2024-03-26 PROCEDURE — 84100 ASSAY OF PHOSPHORUS: CPT

## 2024-03-26 RX ORDER — LIDOCAINE HYDROCHLORIDE 10 MG/ML
INJECTION INFILTRATION; PERINEURAL
Status: DISCONTINUED | OUTPATIENT
Start: 2024-03-26 | End: 2024-03-26 | Stop reason: HOSPADM

## 2024-03-26 RX ORDER — ONDANSETRON HYDROCHLORIDE 2 MG/ML
4 INJECTION, SOLUTION INTRAVENOUS EVERY 8 HOURS PRN
Status: DISCONTINUED | OUTPATIENT
Start: 2024-03-26 | End: 2024-04-12 | Stop reason: HOSPADM

## 2024-03-26 RX ORDER — HEPARIN SODIUM 1000 [USP'U]/ML
INJECTION, SOLUTION INTRAVENOUS; SUBCUTANEOUS
Status: DISCONTINUED | OUTPATIENT
Start: 2024-03-26 | End: 2024-03-26 | Stop reason: HOSPADM

## 2024-03-26 RX ORDER — HYDRALAZINE HYDROCHLORIDE 20 MG/ML
10 INJECTION INTRAMUSCULAR; INTRAVENOUS EVERY 4 HOURS PRN
Status: DISCONTINUED | OUTPATIENT
Start: 2024-03-26 | End: 2024-04-12 | Stop reason: HOSPADM

## 2024-03-26 RX ORDER — FENTANYL CITRATE 50 UG/ML
INJECTION, SOLUTION INTRAMUSCULAR; INTRAVENOUS
Status: DISCONTINUED | OUTPATIENT
Start: 2024-03-26 | End: 2024-03-26 | Stop reason: HOSPADM

## 2024-03-26 RX ORDER — VERAPAMIL HYDROCHLORIDE 2.5 MG/ML
INJECTION, SOLUTION INTRAVENOUS
Status: DISCONTINUED | OUTPATIENT
Start: 2024-03-26 | End: 2024-03-26 | Stop reason: HOSPADM

## 2024-03-26 RX ORDER — NITROGLYCERIN 20 MG/100ML
INJECTION INTRAVENOUS
Status: DISCONTINUED | OUTPATIENT
Start: 2024-03-26 | End: 2024-03-26 | Stop reason: HOSPADM

## 2024-03-26 RX ADMIN — IPRATROPIUM BROMIDE AND ALBUTEROL SULFATE 3 ML: 2.5; .5 SOLUTION RESPIRATORY (INHALATION) at 03:03

## 2024-03-26 RX ADMIN — THIAMINE HYDROCHLORIDE 500 MG: 100 INJECTION, SOLUTION INTRAMUSCULAR; INTRAVENOUS at 03:03

## 2024-03-26 RX ADMIN — ENOXAPARIN SODIUM 90 MG: 100 INJECTION SUBCUTANEOUS at 10:03

## 2024-03-26 RX ADMIN — THIAMINE HYDROCHLORIDE 500 MG: 100 INJECTION, SOLUTION INTRAMUSCULAR; INTRAVENOUS at 11:03

## 2024-03-26 RX ADMIN — OXYCODONE HYDROCHLORIDE 5 MG: 5 TABLET ORAL at 10:03

## 2024-03-26 RX ADMIN — CHLORHEXIDINE GLUCONATE 0.12% ORAL RINSE 15 ML: 1.2 LIQUID ORAL at 10:03

## 2024-03-26 RX ADMIN — PANTOPRAZOLE SODIUM 40 MG: 40 INJECTION, POWDER, FOR SOLUTION INTRAVENOUS at 09:03

## 2024-03-26 RX ADMIN — IPRATROPIUM BROMIDE AND ALBUTEROL SULFATE 3 ML: 2.5; .5 SOLUTION RESPIRATORY (INHALATION) at 11:03

## 2024-03-26 RX ADMIN — MUPIROCIN: 20 OINTMENT TOPICAL at 09:03

## 2024-03-26 RX ADMIN — SUCRALFATE 1 G: 1 TABLET ORAL at 10:03

## 2024-03-26 RX ADMIN — MUPIROCIN: 20 OINTMENT TOPICAL at 10:03

## 2024-03-26 RX ADMIN — AMOXICILLIN AND CLAVULANATE POTASSIUM 1 TABLET: 875; 125 TABLET, FILM COATED ORAL at 10:03

## 2024-03-26 RX ADMIN — IPRATROPIUM BROMIDE AND ALBUTEROL SULFATE 3 ML: 2.5; .5 SOLUTION RESPIRATORY (INHALATION) at 12:03

## 2024-03-26 RX ADMIN — AMOXICILLIN AND CLAVULANATE POTASSIUM 1 TABLET: 875; 125 TABLET, FILM COATED ORAL at 09:03

## 2024-03-26 RX ADMIN — THIAMINE HYDROCHLORIDE 500 MG: 100 INJECTION, SOLUTION INTRAMUSCULAR; INTRAVENOUS at 10:03

## 2024-03-26 RX ADMIN — CHLORHEXIDINE GLUCONATE 0.12% ORAL RINSE 15 ML: 1.2 LIQUID ORAL at 09:03

## 2024-03-26 RX ADMIN — IPRATROPIUM BROMIDE AND ALBUTEROL SULFATE 3 ML: 2.5; .5 SOLUTION RESPIRATORY (INHALATION) at 07:03

## 2024-03-26 RX ADMIN — PANTOPRAZOLE SODIUM 40 MG: 40 INJECTION, POWDER, FOR SOLUTION INTRAVENOUS at 10:03

## 2024-03-26 RX ADMIN — MORPHINE SULFATE 2 MG: 4 INJECTION, SOLUTION INTRAMUSCULAR; INTRAVENOUS at 05:03

## 2024-03-26 RX ADMIN — MIDODRINE HYDROCHLORIDE 10 MG: 5 TABLET ORAL at 12:03

## 2024-03-26 RX ADMIN — Medication 6 MG: at 10:03

## 2024-03-26 RX ADMIN — MIDODRINE HYDROCHLORIDE 10 MG: 5 TABLET ORAL at 09:03

## 2024-03-26 RX ADMIN — IPRATROPIUM BROMIDE AND ALBUTEROL SULFATE 3 ML: 2.5; .5 SOLUTION RESPIRATORY (INHALATION) at 08:03

## 2024-03-26 RX ADMIN — OXYCODONE HYDROCHLORIDE 5 MG: 5 TABLET ORAL at 09:03

## 2024-03-26 NOTE — PT/OT/SLP PROGRESS
Occupational Therapy   Treatment    Name: Ran Reyes Jr.  MRN: [id number]  Admitting Diagnosis:  Scrotal hematoma  4 Days Post-Op    Recommendations:     Recommended therapy intensity at discharge: No Therapy Indicated   Discharge Equipment Recommendations:  walker, rolling  Barriers to discharge:  None    Assessment:     Ran Reyes Jr. is a 68 y.o. male with a medical diagnosis of Scrotal hematoma. Performance deficits affecting function are weakness, impaired endurance. Pt has made great progress with therapy. Mobilizing and completing ADLs with Mod I around room.     Rehab Prognosis:  Good; patient would benefit from acute skilled OT services to address these deficits and reach maximum level of function.       Plan:     Patient to be seen 5 x/week to address the above listed problems via self-care/home management, therapeutic activities, therapeutic exercises  Plan of Care Expires: 04/21/24  Plan of Care Reviewed with: patient    Subjective     Pain/Comfort:  Location 1: scrotum  Pain Addressed 1: Reposition, Distraction    Objective:     Communicated with: RN prior to session.  Patient found supine with telemetry upon OT entry to room.    General Precautions: Standard, fall    Orthopedic Precautions:N/A  Braces: N/A  Respiratory Status: pt on 4L oxymask upon entry. RN stated ok to take off if pt not SOB. Taken off for mobility with no complaints of SOB.      Occupational Performance:     Bed Mobility:    Patient completed Scooting/Bridging with independence  Patient completed Supine to Sit with independence     Functional Mobility/Transfers:  Patient completed Sit <> Stand Transfer with modified independence  with  rolling walker   Functional Mobility: ambulated to bathroom x2 trials with Mod I and RW. No LOB.     Activities of Daily Living:  Toileting: performed toilet t/f with Mod I. Able to completed posterior pericare independently.   LE dressing: provided pt with AE for LBD. Able to don/doff socks  seated in chair with Mod I using sock aid and reacher. Educated on use for home.     Therapeutic Positioning    OT interventions performed during the course of today's session in an effort to prevent and/or reduce acquired pressure injuries:   Education was provided on benefits of and recommendations for therapeutic positioning    [facility] 6 Click ADL:      Patient Education:  Patient provided with verbal education education regarding OT role/goals/POC, fall prevention, safety awareness, and Discharge/DME recommendations.  Understanding was verbalized.      Patient left up in chair with all lines intact and call button in reach.    GOALS:   Multidisciplinary Problems       Occupational Therapy Goals          Problem: Occupational Therapy    Goal Priority Disciplines Outcome Interventions   Occupational Therapy Goal     OT, PT/OT Ongoing, Progressing    Description: Goals to be met by: 4/21/24     Patient will increase functional independence with ADLs by performing:    UE Dressing with Stand-by Assistance.  LE Dressing with Min A  Grooming while standing at sink with Contact Guard Assistance.  Toileting from toilet with Contact Guard Assistance for hygiene and clothing management.   Toilet transfer to toilet with Contact Guard Assistance.  Increased functional strength to 4/5 through therEx.                         Time Tracking:     OT Date of Treatment: 03/26/24  OT Start Time: 0831  OT Stop Time: 0854  OT Total Time (min): 23 min    Billable Minutes:Self Care/Home Management 23    OT/ALLAN: ALLAN     Number of ALLAN visits since last OT visit: 2    3/26/2024

## 2024-03-26 NOTE — PT/OT/SLP DISCHARGE
Physical Therapy Discharge Summary    Name: Ran Reyes Jr.  MRN: [id number]   Principal Problem: Scrotal hematoma     Patient Discharged from acute Physical Therapy on 3/26/24.  Please refer to prior PT noted date on 3/26/24 for functional status.     Assessment:     Patient has met all goals and is not appropriate for therapy.    Objective:     GOALS:   Multidisciplinary Problems       Physical Therapy Goals          Problem: Physical Therapy    Goal Priority Disciplines Outcome Goal Variances Interventions   Physical Therapy Goal     PT, PT/OT Ongoing, Progressing     Description: Goals to be met by: 24     Patient will increase functional independence with mobility by performin. Supine to sit with MInimal Assistance  2. Sit to supine with MInimal Assistance  3. Sit to stand transfer with Stand-by Assistance  4. Gait  x 150 feet with Stand-by Assistance using Rolling Walker.                          Reasons for Discontinuation of Therapy Services  Satisfactory goal achievement.      Plan:     Patient Discharged to: Home no PT services needed.      3/26/2024

## 2024-03-26 NOTE — PT/OT/SLP PROGRESS
Physical Therapy Treatment    Patient Name:  Ran Reyes Jr.   MRN:  [id number]    Recommendations:     Discharge therapy intensity: No Therapy Indicated   Discharge Equipment Recommendations: to be determined by next level of care  Barriers to discharge: Ongoing medical needs    Assessment:     Ran Reyes Jr. is a 68 y.o. male admitted with a medical diagnosis of testicular swelling and bleeding; L scrotal abscess; sepsis; UTI; s/p orchiectomy, joselito scrotal exploration, debridement; post op vfib arrest; Human Metapneumovirus..  He presents with the following impairments/functional limitations: weakness, gait instability, decreased upper extremity function, impaired endurance, impaired balance, decreased lower extremity function, impaired cardiopulmonary response to activity, impaired functional mobility, impaired self care skills .    Rehab Prognosis: Good; patient would benefit from acute skilled PT services to address these deficits and reach maximum level of function.    Recent Surgery: Procedure(s) (LRB):  EGD (N/A)  EGD, WITH CLOSED BIOPSY 4 Days Post-Op    Plan:     During this hospitalization, patient to be seen 5 x/week to address the identified rehab impairments via gait training, therapeutic activities, therapeutic exercises and progress toward the following goals:    Plan of Care Expires:  04/21/24    Subjective     Chief Complaint:   Patient/Family Comments/goals:   Pain/Comfort:  Pain Rating 1: 0/10      Objective:     Communicated with NSG prior to session.  Patient found HOB elevated with   upon PT entry to room.     General Precautions: Standard, fall  Orthopedic Precautions: N/A  Braces: N/A  Respiratory Status:  oxymask, 4L NSG cleared pt to mobilize on room air as long as pt was not SOB  Blood Pressure:   Skin Integrity: Visible skin intact      Functional Mobility:  Bed Mobility:     Scooting: independence  Supine to Sit: independence  Transfers:     Sit to Stand:  modified independence  with rolling walker and 1 trial from EOB and 1 trial from toilet  Toilet Transfer: modified independence with  rolling walker  using  Step Transfer  Gait: pt amb 40ft/200ft with RW Elizabeth. Pt with step-through gait pattern and no LOB.   Dyn sitting: pt able to reach outside his CARLOS to don/doff socks. Pt independent in regards to sitting balance.       Patient left up in chair with call button in reach    GOALS:   Multidisciplinary Problems       Physical Therapy Goals          Problem: Physical Therapy    Goal Priority Disciplines Outcome Goal Variances Interventions   Physical Therapy Goal     PT, PT/OT Ongoing, Progressing     Description: Goals to be met by: 24     Patient will increase functional independence with mobility by performin. Supine to sit with MInimal Assistance  2. Sit to supine with MInimal Assistance  3. Sit to stand transfer with Stand-by Assistance  4. Gait  x 150 feet with Stand-by Assistance using Rolling Walker.                          Time Tracking:     PT Received On: 24  PT Start Time: 0833     PT Stop Time: 0856  PT Total Time (min): 23 min     Billable Minutes: Gait Training 15 and Therapeutic Activity 8    Treatment Type: Treatment  PT/PTA: PTA     Number of PTA visits since last PT visit: 3     2024

## 2024-03-26 NOTE — INTERVAL H&P NOTE
Patient name: Ran Reyes Jr.  MRN: [id number]  : 1955  Cath Lab Procedure H&P Update    Pre-Procedure Assessment:    I saw and examined the patient face to face. The patient has been re-evaluated and his condition is unchanged. The reason for admission, procedure and care is still present.  Based on the patients H&P, pre-procedure physical exam, relevant diagnostic studies, NPO status and information obtained from the patient, I determine the patient is an appropriate candidate for the proposed procedure and anesthesia planned. I further certify the anesthesia risks, benefits and options have been explained to the patient to which he agrees as documented on the procedural consent.

## 2024-03-26 NOTE — PROGRESS NOTES
[facility] Medicine Progress Note        Chief Complaint: Inpatient Follow-up     HPI:   68-year-old male with a history of nonischemic cardiomyopathy, systolic heart failure with an EF of 41%, VHD, chronic AFib on Xarelto, peripheral arterial disease, COPD, HTN, and a large left testicle hydrocele who presents to the ER complaining of what he thought was rectal bleeding as he was noting blood in the toilet and running down his legs.  Ultimately was found that the bleeding was coming from the posterior aspect of his scrotum from a superficial scrotal ulceration.  He also complained of persistent testicular swelling and pain.Doppler ultrasound was significantly limited, but could not rule out the presence of torsion.Urology was consulted and they made decision to proceed with emergent scrotal exploration.  Patient underwent surgery, seemed to tolerate well.  In the PACU, patient was persistently hypotensive with blood pressures staying approximately 80/50 despite fluid resuscitation. Upgraded to ICU for vasopressor support.Went into cardiac arrest with ventricular tachycardia requiring defibrillation x3 (03/18/2024).Remains on Antibiotics.Now off of pressors.Being downgraded to floors.FOBT pos. GI planned for EGD. [facility] planning for Ischemic evaluation. Consultants cleared for anticoagulants. Now  on FD lovenox.Underwent EGD, found to have grade B reflux esophagitis, chronic gastritis, chronic duodenitis.  May need outpatient colonoscopy, recommended Carafate for 7 days commenting dicyclomine or hyoscyamine q.6 p.r.n. for abdominal pain, Protonix 40 IV b.i.d. while inpatient, transition to p.o. for a month on discharge and then daily for 3 months.  Cardiology on board, awaiting ischemic evaluation.  Urology continues to follow.  PT recommending placement     Interval Hx:  Patient was resting comfortably in bed.  He was NPO for left heart catheterization this morning.  Denies any  urologic complaints.  Dobbs catheter is in place.  No pain willing.  Remains on antibiotics    Objective/physical exam:  General: In no acute distress, afebrile  Chest: Clear to auscultation bilaterally  Heart: RRR, +S1, S2, no appreciable murmur  Abdomen: Soft, nontender, BS +  MSK: Warm, no lower extremity edema, no clubbing or cyanosis  Neurologic: Alert and oriented x4, Cranial nerve II-XII intact, Strength 5/5 in all 4 extremities    VITAL SIGNS: 24 HRS MIN & MAX LAST   Temp  Min: 97.7 °F (36.5 °C)  Max: 99.3 °F (37.4 °C) 98.7 °F (37.1 °C)   BP  Min: 94/61  Max: 140/72 113/68   Pulse  Min: 4  Max: 87  80   Resp  Min: 16  Max: 26 20   SpO2  Min: 92 %  Max: 99 % (!) 94 %       Recent Labs   Lab 03/24/24  0153 03/24/24  1357 03/25/24  0244 03/26/24  0449   WBC 8.44  --  7.87 7.64   RBC 2.41*  --  2.78* 2.82*   HGB 7.3* 8.0* 8.7* 8.5*   HCT 23.3* 25.1* 26.2* 26.8*   MCV 96.7*  --  94.2* 95.0*   MCH 30.3  --  31.3* 30.1   MCHC 31.3*  --  33.2 31.7*   RDW 16.7  --  17.2* 16.8     --  407* 381   MPV 9.9  --  9.8 9.6       Recent Labs   Lab 03/20/24  0539 03/20/24  1939 03/21/24  0219 03/24/24  0153 03/25/24  0241 03/26/24  0449   NA  --   --    < > 142 145 142   K  --   --    < > 4.0 4.2 4.0   CO2  --   --    < > 26 26 26   BUN  --   --    < > 16.7 16.5 12.4   CREATININE  --   --    < > 1.07 1.29* 1.14   CALCIUM  --   --    < > 8.1* 8.3* 8.1*   PH 7.510* 7.500*  --   --   --   --    MG  --   --    < > 2.10 2.10 2.00   ALBUMIN  --   --    < > 2.3* 2.4* 2.4*   ALKPHOS  --   --    < > 64 67 67   ALT  --   --    < > 10 9 10   AST  --   --    < > 19 19 18   BILITOT  --   --    < > 0.5 0.8 0.9    < > = values in this interval not displayed.          Microbiology Results (last 7 days)       Procedure Component Value Units Date/Time    Blood Culture [[id number]]  (Normal) Collected: 03/18/24 2226    Order Status: Completed Specimen: Blood, Venous Updated: 03/23/24 2300     CULTURE, BLOOD ([facility]) No Growth at 5 days     Blood culture #2 **CANNOT BE ORDERED STAT** [[id number]]  (Normal) Collected: 03/17/24 2103    Order Status: Completed Specimen: Blood Updated: 03/22/24 2200     CULTURE, BLOOD ([facility]) No Growth at 5 days    Blood culture #1 **CANNOT BE ORDERED STAT** [[id number]]  (Normal) Collected: 03/17/24 2103    Order Status: Completed Specimen: Blood Updated: 03/22/24 2200     CULTURE, BLOOD ([facility]) No Growth at 5 days    Respiratory Culture [[id number]]  (Abnormal) Collected: 03/20/24 0940    Order Status: Completed Specimen: Sputum, Expectorated Updated: 03/22/24 0912     Respiratory Culture Moderate Yeast     Comment: with no normal respiratory jt        GRAM STAIN Quality 2+      No bacteria seen    Wound Culture [[id number]]  (Abnormal)  (Susceptibility) Collected: 03/18/24 1502    Order Status: Completed Specimen: Abscess from Scrotum Updated: 03/21/24 1306     Wound Culture Many Escherichia coli      Many Streptococcus agalactiae (Group B)    Anaerobic Culture [[id number]] Collected: 03/18/24 1502    Order Status: Completed Specimen: Abscess from Scrotum Updated: 03/21/24 0941     Anaerobe Culture No Anaerobes Isolated    Urine culture [[id number]]  (Abnormal) Collected: 03/18/24 0034    Order Status: Completed Specimen: Urine Updated: 03/19/24 1004     Urine Culture No other significant growth      10,000 - 25,000 colonies/ml Yeast species     Comment: We have not further evaluated these organisms because three or more species compatible with normal genital jt usually represents specimen contamination from the time of collection, rather than infection. If clinical circumstances warrant further   workup of these organisms, please contact the Microbiology dept at [phone]; otherwise please have the patient submit another specimen.                Radiology:  X-Ray Chest 1 View  Narrative: EXAMINATION:  XR CHEST 1 VIEW    CLINICAL HISTORY:  wheeze, check for edema?;    TECHNIQUE:  Single frontal view of the chest was  performed.    COMPARISON:  03/24/2024    FINDINGS:  LINES AND TUBES: EKG/telemetry leads overlie the chest.    MEDIASTINUM AND MARTA: Cardiac silhouette is enlarged. Aortic atherosclerosis.    LUNGS: Vascular congestion without overt edema.    PLEURA:No pleural effusion. No pneumothorax.    OTHER: No acute osseous abnormality.  Impression: Enlarged cardiac silhouette and vascular congestion without overt alveolar edema.    Electronically signed by: Sailaja Atkinson  Date:    03/25/2024  Time:    16:58      Scheduled Med:   albuterol-ipratropium  3 mL Nebulization Once    albuterol-ipratropium  3 mL Nebulization Q4H    amoxicillin-clavulanate 875-125mg  1 tablet Oral Q12H    atorvastatin  80 mg Oral Daily    chlorhexidine  15 mL Mouth/Throat BID    enoxparin  1 mg/kg Subcutaneous Q12H (prophylaxis, 0900/2100)    folic acid  1 mg Oral Daily    LIDOcaine (PF) 10 mg/ml (1%)  1 mL Intradermal Once    midodrine  10 mg Oral TID WM    multivitamin  1 tablet Oral Daily    mupirocin   Nasal BID    pantoprazole  40 mg Intravenous BID    scopolamine  1 patch Transdermal Q3 Days    sucralfate  1 g Oral QID (AC & HS)    thiamine (B-1) 500 mg in dextrose 5 % (D5W) 100 mL IVPB  500 mg Intravenous TID        Continuous Infusions:       PRN Meds:  0.9%  NaCl infusion (for blood administration), 0.9%  NaCl infusion (for blood administration), acetaminophen, dextrose 10 % in water (D10W), dextrose 10 % in water (D10W), dextrose 10%, dextrose 10%, insulin aspart U-100, ipratropium, LORazepam, melatonin, morphine, oxyCODONE, sodium chloride 0.9%, sodium chloride 0.9%       Assessment/Plan:   Scrotal abscess s/p I&D with left joselito orchiectomy 03/18/2024  Abnormal Chest Imaging with Rt lower lobe Infiltrate, concern Post Viral Pneumonia.-improving  Human Metapneumovirus +ve  Acute hypoxic respiratory failure from above  Sepsis from above requiring vasopressor supoort  Recent cardiac arrest with ventricular tachycardia requiring  defibrillation x3 (03/18/2024)  AFib RVR   NSTMI, unspecified  Partial bowel obstruction  Normocytic anemia.    FOBT positive.Grade B reflux esophagitis, chronic gastritis, chronic duodenitis.    Electrolyte derangements   Abnormal CTA-abdominal aortic aneurysmal dilatation  with mural thrombus  ELIZABETH    Patient going for left heart catheterization with Cardiology this morning.  Will follow up the recommendations postprocedure.  GI has cleared for dual antiplatelet therapy if needed.  Urology continues to follow along.  Patient was transitioned from IV Rocephin to oral Augmentin.  Scrotal wound culture grew E coli and group B strep with the which were penicillin sensitive.  Will plan for 6 more day course per Urology recommendations.  He can follow up with Urology as an outpatient.  Labs are stable this morning including electrolytes, renal function, and cell lines.  He does remain on supplemental O2.  Need to continue to wean aggressively.  Will make sure that he was got an incentive spirometer.  May end up requiring some supplemental O2 to return home.  Currently on 4 L OxyMask    Critical care diagnosis: acute hypoxemic respiratory failure requiring high-flow oxygen  Critical care interventions: hands on evaluation, review of labs/radiographs/records and discussions with family  Critical care time spent: >32 minutes       Tha Edmond MD   03/26/2024     All diagnosis and differential diagnosis have been reviewed; assessment and plan has been documented; I have personally reviewed the labs and test results that are presently available; I have reviewed the patients medication list; I have reviewed the consulting providers response and recommendations. I have reviewed or attempted to review medical records based upon their availability    All of the patient's questions have been  addressed and answered. Patient's is agreeable to the above stated plan. I will continue to monitor closely and make adjustments to  medical management as needed.  _____________________________________________________________________

## 2024-03-26 NOTE — PROGRESS NOTES
"  [facility]    Cardiology  Progress Note    Patient Name: Ran Reyes Jr.  MRN: [id number]  Admission Date: 3/17/2024  Hospital Length of Stay: 8 days  Code Status: Full Code   Attending Physician: Tha Edmond MD   Primary Care Physician: Shiela, Primary Doctor  Expected Discharge Date:   Principal Problem:Scrotal hematoma    Subjective:   Chief Complaint/Reason for Consult: OAC recs     HPI: Ran Reyes Jr. Is a 68 year old male, known to Dr. Wagner, with PMH of  cardiomyopathy, systolic heart failure with an EF of 41%, VHD, chronic AFib on Xarelto, peripheral arterial disease, COPD, HTN, and a large left testicle hydrocele who presented to the ED 3/18/24 for scrotal bleeding with testicular swelling and pain. Found to have sepsis likely due to UTI, acute bronchitis. Also found in ED to be in Afib RVR on EKG, bp 90s/60s. BNP 1273, troponin 0.045 --> 0.055. Patient admits to dyspnea, cough, and wheezing. Denies chest pains. Cardiology being consulted for OAC recommendations.    Hospital Course:  3.19.24: Intubated/Mechanical Ventilation. AF SVR. On Vasopressor Support. Family at bedside.   3.20.24: NAD. Vented/Sedated. PAF/CVR. Dobutamine 2.5mcg/kg/min. Amiodarone 0.5mg/min.   3.21.24: NAD. Extubated. "I am ok." PAF/CVR. Levophed 0.01mcg/kg/min. Oral Amiodarone.   3.22.24: NAD. "I am feeling better." PAF/SVR but Mostly CVR. Off Pressors.   3.23.24: NAD. "I am good." PAF/SVR - Mostly CVR. No Profound Bradycardia since Yesterday. Remains off Pressors.   3.24.24: NAD. "I am fine." PAF/SVR, Mostly CVR in 60s, PVCs. H&H 7.3/23.3 - Transfusion of PRBCs.  3.25.24: NAD. "I am ok." PAF/SVR. Mostly CVR 60s-70s. H&H 8.7/26.2; GI Clear for DAPT/AC  3.26.24: NAD. Sitting in Bedside Chair. "I am feeling pretty good." H&H 8.5/26.8 s/p 1 Unit PRBCS on 3.25.24. Simple Face Mask. Denies CP, SOB and Palps.      PMH: Cardiomyopathy, systolic heart failure with an EF of 41%, VHD, chronic AFib on Xarelto, peripheral " arterial disease, COPD, HTN, CAD (Details Unclear)  PSH: cardiac stent >10 years ago, L FA atherectomy and angioplasty, R SFA stent, cataract extraction,   Family History: Father MI at age 66.   Social History: 10+ pack year hx current smoker, social EtOH, denies drugs.     Previous Cardiac Diagnostics:   Echocardiogram (3.19.24):  Left Ventricle: The left ventricle is normal in size. Increased wall thickness. Unable to assess wall motion. There is moderately reduced systolic function with a visually estimated ejection fraction of 30 - 40%.  Right Ventricle: Mild right ventricular enlargement.  Left Atrium: Left atrium is moderately dilated.  Right Atrium: Right atrium is severely dilated.  Mitral Valve: There is moderate regurgitation.  Tricuspid Valve: There is mild to moderate regurgitation.  Pulmonary Artery: Pulmonary artery pressure could not be estimated.  IVC/SVC: Patient is ventilated, cannot use IVC diameter to estimate right atrial pressure.    Echocardiogram (1.31.24):   Left Ventricle: The left ventricle is normal in size. Mildly increased wall thickness. There is reduced systolic function. Biplane (2D) method of discs ejection fraction is 41%. Unable to assess diastolic function due to atrial fibrillation.  Right Ventricle: Normal right ventricular cavity size. Systolic function is mildly reduced.  Left Atrium: Left atrium is severely dilated.  Right Atrium: Right atrium is severely dilated.  Aortic Valve: The aortic valve is a trileaflet valve.  Mitral Valve: There is bileaflet sclerosis. There is moderate to severe regurgitation.  Tricuspid Valve: There is mild regurgitation.  IVC/SVC: Normal venous pressure at 3 mmHg.    Carotid US (2.7.23):  The study quality is average.   1-39% stenosis in the proximal right internal carotid artery based on Bluth Criteria. Antegrade right vertebral artery flow.   1-39% stenosis in the proximal left internal carotid artery based on Bluth Criteria. Antegrade left  vertebral artery flow.     Echocardiogram (11.8.22):  The study quality is average.   The left ventricle is moderately enlarged. Left ventricular diastolic dimension is 6.4 cms. Global left ventricular systolic function is moderately decreased. The left ventricular ejection fraction is 40%. Arrhythmia noted throughout much of the exam.   There is no evidence of mitral stenosis or prolapse appreciated. MV Ortiz score ~ 5. The area by planimetry is 5.3 cm². The mean trans mitral gradient is 1.6 mmHg. Suspect borderline severe (4+) mitral regurgitation with a posteriorly directed jet. MR PISA radius ~ 0.93 cm, MR ERO ~ 0.35 cm^2, MR regurgitant volume ~ 72 ml/beat, MR regurgitant fraction ~ 55%, MR vena contracta ~ 0.54 cm.  Mild calcification of the aortic valve is noted with adequate cuspal excursion.   Trace pulmonic regurgitation. Mild (1+) tricuspid regurgitation.    Peripheral Angiogram (10.17.22):  Underwent Successful CSI Atherectomy Balloon Angioplasty and Stenting of the Right SFA and CSI Atherectomy Balloon Angioplasty of Right Anterior Tibial Artery Using Pedal Approach.    Peripheral Angiogram (9.12.22):  Underwent Successful Left SFA Laser Atherectomy Balloon Angioplasty Using Left Radial Artery Approach.    Review of Systems   Constitutional: Positive for malaise/fatigue. Negative for fever.   Cardiovascular:  Negative for chest pain and leg swelling.   Respiratory:  Negative for shortness of breath.    Skin:         Scrotal Wound   All other systems reviewed and are negative.    Objective:     Vital Signs (Most Recent):  Temp: 98.8 °F (37.1 °C) (03/26/24 0736)  Pulse: 64 (03/26/24 0818)  Resp: 18 (03/26/24 0937)  BP: 119/79 (03/26/24 0736)  SpO2: (!) 93 % (03/26/24 0818) Vital Signs (24h Range):  Temp:  [97.7 °F (36.5 °C)-99.3 °F (37.4 °C)] 98.8 °F (37.1 °C)  Pulse:  [4-87] 64  Resp:  [16-26] 18  SpO2:  [91 %-99 %] 93 %  BP: ()/(60-85) 119/79   Weight: 89.8 kg (197 lb 15.6 oz)  Body mass index  is 27.61 kg/m².  SpO2: (!) 93 %       Intake/Output Summary (Last 24 hours) at 3/26/2024 1014  Last data filed at 3/26/2024 0500  Gross per 24 hour   Intake 760 ml   Output 1500 ml   Net -740 ml       Lines/Drains/Airways       Drain  Duration                  Urethral Catheter 03/18/24 1426 Straight-tip 16 Fr. 7 days              Peripheral Intravenous Line  Duration                  Peripheral IV - Single Lumen 03/24/24 1634 18 G Anterior;Left Forearm 1 day         Peripheral IV - Single Lumen 03/24/24 2000 20 G Posterior;Right Hand 1 day                  Significant Labs:   Recent Results (from the past 72 hour(s))   Triglycerides    Collection Time: 03/24/24  1:53 AM   Result Value Ref Range    Triglyceride 58 34 - 140 mg/dL   Comprehensive metabolic panel    Collection Time: 03/24/24  1:53 AM   Result Value Ref Range    Sodium Level 142 136 - 145 mmol/L    Potassium Level 4.0 3.5 - 5.1 mmol/L    Chloride 105 98 - 107 mmol/L    Carbon Dioxide 26 23 - 31 mmol/L    Glucose Level 99 82 - 115 mg/dL    Blood Urea Nitrogen 16.7 8.4 - 25.7 mg/dL    Creatinine 1.07 0.73 - 1.18 mg/dL    Calcium Level Total 8.1 (L) 8.8 - 10.0 mg/dL    Protein Total 5.4 (L) 5.8 - 7.6 gm/dL    Albumin Level 2.3 (L) 3.4 - 4.8 g/dL    Globulin 3.1 2.4 - 3.5 gm/dL    Albumin/Globulin Ratio 0.7 (L) 1.1 - 2.0 ratio    Bilirubin Total 0.5 <=1.5 mg/dL    Alkaline Phosphatase 64 40 - 150 unit/L    Alanine Aminotransferase 10 0 - 55 unit/L    Aspartate Aminotransferase 19 5 - 34 unit/L    eGFR >60 mls/min/1.73/m2   Magnesium    Collection Time: 03/24/24  1:53 AM   Result Value Ref Range    Magnesium Level 2.10 1.60 - 2.60 mg/dL   Phosphorus    Collection Time: 03/24/24  1:53 AM   Result Value Ref Range    Phosphorus Level 2.7 2.3 - 4.7 mg/dL   CBC with Differential    Collection Time: 03/24/24  1:53 AM   Result Value Ref Range    WBC 8.44 4.50 - 11.50 x10(3)/mcL    RBC 2.41 (L) 4.70 - 6.10 x10(6)/mcL    Hgb 7.3 (L) 14.0 - 18.0 g/dL    Hct 23.3 (L)  42.0 - 52.0 %    MCV 96.7 (H) 80.0 - 94.0 fL    MCH 30.3 27.0 - 31.0 pg    MCHC 31.3 (L) 33.0 - 36.0 g/dL    RDW 16.7 11.5 - 17.0 %    Platelet 378 130 - 400 x10(3)/mcL    MPV 9.9 7.4 - 10.4 fL    Neut % 70.0 %    Lymph % 17.5 %    Mono % 8.1 %    Eos % 0.9 %    Basophil % 0.4 %    Lymph # 1.48 0.6 - 4.6 x10(3)/mcL    Neut # 5.91 2.1 - 9.2 x10(3)/mcL    Mono # 0.68 0.1 - 1.3 x10(3)/mcL    Eos # 0.08 0 - 0.9 x10(3)/mcL    Baso # 0.03 <=0.2 x10(3)/mcL    IG# 0.26 (H) 0 - 0.04 x10(3)/mcL    IG% 3.1 %    NRBC% 0.2 %   Prepare RBC 1 Unit    Collection Time: 03/24/24  4:31 AM   Result Value Ref Range    UNIT NUMBER [id number]     UNIT ABO/RH A POS     DISPENSE STATUS Transfused     Unit Expiration [id number]     Product Code M7832R53     Unit Blood Type Code 6200     CROSSMATCH INTERPRETATION Compatible    Type & Screen    Collection Time: 03/24/24  4:31 AM   Result Value Ref Range    Group & Rh A POS     Indirect Shahab GEL NEG     Specimen Outdate 03/27/2024 23:59    Prepare RBC 1 Unit    Collection Time: 03/24/24  4:31 AM   Result Value Ref Range    UNIT NUMBER [id number]     UNIT ABO/RH A NEG     DISPENSE STATUS Transfused     Unit Expiration [id number]     Product Code M1514R98     Unit Blood Type Code 0600     CROSSMATCH INTERPRETATION Compatible    Hemoglobin and Hematocrit    Collection Time: 03/24/24  1:57 PM   Result Value Ref Range    Hgb 8.0 (L) 14.0 - 18.0 g/dL    Hct 25.1 (L) 42.0 - 52.0 %   Phosphorus    Collection Time: 03/25/24  2:41 AM   Result Value Ref Range    Phosphorus Level 3.3 2.3 - 4.7 mg/dL   Magnesium    Collection Time: 03/25/24  2:41 AM   Result Value Ref Range    Magnesium Level 2.10 1.60 - 2.60 mg/dL   Comprehensive metabolic panel    Collection Time: 03/25/24  2:41 AM   Result Value Ref Range    Sodium Level 145 136 - 145 mmol/L    Potassium Level 4.2 3.5 - 5.1 mmol/L    Chloride 106 98 - 107 mmol/L    Carbon Dioxide 26 23 - 31 mmol/L    Glucose Level 104 82 - 115 mg/dL    Blood  Urea Nitrogen 16.5 8.4 - 25.7 mg/dL    Creatinine 1.29 (H) 0.73 - 1.18 mg/dL    Calcium Level Total 8.3 (L) 8.8 - 10.0 mg/dL    Protein Total 6.1 5.8 - 7.6 gm/dL    Albumin Level 2.4 (L) 3.4 - 4.8 g/dL    Globulin 3.7 (H) 2.4 - 3.5 gm/dL    Albumin/Globulin Ratio 0.6 (L) 1.1 - 2.0 ratio    Bilirubin Total 0.8 <=1.5 mg/dL    Alkaline Phosphatase 67 40 - 150 unit/L    Alanine Aminotransferase 9 0 - 55 unit/L    Aspartate Aminotransferase 19 5 - 34 unit/L    eGFR >60 mls/min/1.73/m2   Triglycerides    Collection Time: 03/25/24  2:41 AM   Result Value Ref Range    Triglyceride 64 34 - 140 mg/dL   CBC with Differential    Collection Time: 03/25/24  2:44 AM   Result Value Ref Range    WBC 7.87 4.50 - 11.50 x10(3)/mcL    RBC 2.78 (L) 4.70 - 6.10 x10(6)/mcL    Hgb 8.7 (L) 14.0 - 18.0 g/dL    Hct 26.2 (L) 42.0 - 52.0 %    MCV 94.2 (H) 80.0 - 94.0 fL    MCH 31.3 (H) 27.0 - 31.0 pg    MCHC 33.2 33.0 - 36.0 g/dL    RDW 17.2 (H) 11.5 - 17.0 %    Platelet 407 (H) 130 - 400 x10(3)/mcL    MPV 9.8 7.4 - 10.4 fL    Neut % 71.5 %    Lymph % 16.1 %    Mono % 9.3 %    Eos % 0.8 %    Basophil % 0.4 %    Lymph # 1.27 0.6 - 4.6 x10(3)/mcL    Neut # 5.63 2.1 - 9.2 x10(3)/mcL    Mono # 0.73 0.1 - 1.3 x10(3)/mcL    Eos # 0.06 0 - 0.9 x10(3)/mcL    Baso # 0.03 <=0.2 x10(3)/mcL    IG# 0.15 (H) 0 - 0.04 x10(3)/mcL    IG% 1.9 %    NRBC% 0.3 %   Uric Acid    Collection Time: 03/25/24  1:44 PM   Result Value Ref Range    Uric Acid 3.1 (L) 3.5 - 7.2 mg/dL   Sodium, Random Urine    Collection Time: 03/25/24  5:56 PM   Result Value Ref Range    Urine Sodium 75.0 mmol/L   Creatinine, Random Urine    Collection Time: 03/25/24  5:56 PM   Result Value Ref Range    Urine Creatinine 143.4 63.0 - 166.0 mg/dL   Urea Nitrogen, Random Urine    Collection Time: 03/25/24  5:56 PM   Result Value Ref Range    Urine Urea Nitrogen 677.0 mg/dL   Osmolality, Urine    Collection Time: 03/25/24  5:56 PM   Result Value Ref Range    Urine Osmolality 527 300 - 1,300 mOsm/kg    Triglycerides    Collection Time: 03/26/24  4:49 AM   Result Value Ref Range    Triglyceride 58 34 - 140 mg/dL   Comprehensive metabolic panel    Collection Time: 03/26/24  4:49 AM   Result Value Ref Range    Sodium Level 142 136 - 145 mmol/L    Potassium Level 4.0 3.5 - 5.1 mmol/L    Chloride 107 98 - 107 mmol/L    Carbon Dioxide 26 23 - 31 mmol/L    Glucose Level 89 82 - 115 mg/dL    Blood Urea Nitrogen 12.4 8.4 - 25.7 mg/dL    Creatinine 1.14 0.73 - 1.18 mg/dL    Calcium Level Total 8.1 (L) 8.8 - 10.0 mg/dL    Protein Total 5.8 5.8 - 7.6 gm/dL    Albumin Level 2.4 (L) 3.4 - 4.8 g/dL    Globulin 3.4 2.4 - 3.5 gm/dL    Albumin/Globulin Ratio 0.7 (L) 1.1 - 2.0 ratio    Bilirubin Total 0.9 <=1.5 mg/dL    Alkaline Phosphatase 67 40 - 150 unit/L    Alanine Aminotransferase 10 0 - 55 unit/L    Aspartate Aminotransferase 18 5 - 34 unit/L    eGFR >60 mls/min/1.73/m2   Magnesium    Collection Time: 03/26/24  4:49 AM   Result Value Ref Range    Magnesium Level 2.00 1.60 - 2.60 mg/dL   Phosphorus    Collection Time: 03/26/24  4:49 AM   Result Value Ref Range    Phosphorus Level 3.0 2.3 - 4.7 mg/dL   CBC with Differential    Collection Time: 03/26/24  4:49 AM   Result Value Ref Range    WBC 7.64 4.50 - 11.50 x10(3)/mcL    RBC 2.82 (L) 4.70 - 6.10 x10(6)/mcL    Hgb 8.5 (L) 14.0 - 18.0 g/dL    Hct 26.8 (L) 42.0 - 52.0 %    MCV 95.0 (H) 80.0 - 94.0 fL    MCH 30.1 27.0 - 31.0 pg    MCHC 31.7 (L) 33.0 - 36.0 g/dL    RDW 16.8 11.5 - 17.0 %    Platelet 381 130 - 400 x10(3)/mcL    MPV 9.6 7.4 - 10.4 fL    Neut % 66.7 %    Lymph % 24.1 %    Mono % 6.8 %    Eos % 1.0 %    Basophil % 0.5 %    Lymph # 1.84 0.6 - 4.6 x10(3)/mcL    Neut # 5.09 2.1 - 9.2 x10(3)/mcL    Mono # 0.52 0.1 - 1.3 x10(3)/mcL    Eos # 0.08 0 - 0.9 x10(3)/mcL    Baso # 0.04 <=0.2 x10(3)/mcL    IG# 0.07 (H) 0 - 0.04 x10(3)/mcL    IG% 0.9 %    NRBC% 0.0 %     Telemetry: PAF/CVR    Physical Exam  Vitals reviewed.   Constitutional:       General: He is not in acute  distress.     Appearance: Normal appearance. He is ill-appearing.   HENT:      Head: Normocephalic.      Mouth/Throat:      Mouth: Mucous membranes are moist.   Eyes:      Conjunctiva/sclera: Conjunctivae normal.   Cardiovascular:      Rate and Rhythm: Normal rate. Rhythm irregular.      Heart sounds: Murmur heard.   Pulmonary:      Effort: Pulmonary effort is normal. No respiratory distress.      Breath sounds: Decreased breath sounds present.      Comments: Simple Face Mask  Genitourinary:     Comments: Scrotal Sling in Place, Dressing C/D/I  Musculoskeletal:         General: No swelling.      Right lower leg: No edema.      Left lower leg: No edema.   Neurological:      General: No focal deficit present.      Mental Status: He is alert and oriented to person, place, and time.   Psychiatric:         Mood and Affect: Mood normal.         Behavior: Behavior normal.         Judgment: Judgment normal.       Current Inpatient Medications:    Current Facility-Administered Medications:     0.9%  NaCl infusion (for blood administration), , Intravenous, Q24H PRN, Carmen Griggs, FNP    0.9%  NaCl infusion (for blood administration), , Intravenous, Q24H PRN, Joshua Hernandez ANP    acetaminophen tablet 650 mg, 650 mg, Oral, Q8H PRN, Harris Hernandez MD    albuterol-ipratropium 2.5 mg-0.5 mg/3 mL nebulizer solution 3 mL, 3 mL, Nebulization, Once, Natali Seymour MD    albuterol-ipratropium 2.5 mg-0.5 mg/3 mL nebulizer solution 3 mL, 3 mL, Nebulization, Q4H, Natali Seymour MD, 3 mL at 03/26/24 0818    amoxicillin-clavulanate 875-125mg per tablet 1 tablet, 1 tablet, Oral, Q12H, Natali Seymour MD, 1 tablet at 03/26/24 0937    atorvastatin tablet 80 mg, 80 mg, Oral, Daily, Cassidy Obrien MD, 80 mg at 03/25/24 0904    chlorhexidine 0.12 % solution 15 mL, 15 mL, Mouth/Throat, BID, David Gonsalez MD, 15 mL at 03/26/24 0937    dextrose 10 % infusion, , Intravenous, PRN, David Gonsalez MD    dextrose 10 % infusion, ,  Intravenous, PRN, David Gonsalez MD    dextrose 10% bolus 125 mL 125 mL, 12.5 g, Intravenous, PRN, David Gonsalez MD    dextrose 10% bolus 250 mL 250 mL, 25 g, Intravenous, PRN, David Gonsalez MD    enoxaparin injection 90 mg, 1 mg/kg, Subcutaneous, Q12H (prophylaxis, 0900/2100), Natali Seymour MD, 90 mg at 03/25/24 2156    folic acid tablet 1 mg, 1 mg, Oral, Daily, Jenna Miller MD, 1 mg at 03/25/24 0904    insulin aspart U-100 injection 0-10 Units, 0-10 Units, Subcutaneous, Q6H PRN, Narendra Pradhan DO    ipratropium 0.02 % nebulizer solution 0.5 mg, 0.5 mg, Nebulization, Q6H PRN, David Gonsalez MD    LIDOcaine (PF) 10 mg/ml (1%) injection 10 mg, 1 mL, Intradermal, Once, Maxi Jordan DO    LORazepam tablet 2 mg, 2 mg, Oral, Q4H PRN, Jenna Miller MD, 2 mg at 03/21/24 0031    melatonin tablet 6 mg, 6 mg, Oral, Nightly PRN, Harris Hernandez MD, 6 mg at 03/24/24 2200    midodrine tablet 10 mg, 10 mg, Oral, TID WM, Narendra Pradhan DO, 10 mg at 03/26/24 0937    morphine injection 2 mg, 2 mg, Intravenous, Q6H PRN, David Gonsalez MD, 2 mg at 03/26/24 0522    multivitamin tablet, 1 tablet, Oral, Daily, Jenna Miller MD, 1 tablet at 03/25/24 0904    mupirocin 2 % ointment, , Nasal, BID, Cassidy Obrien MD, Given at 03/26/24 0940    oxyCODONE immediate release tablet 5 mg, 5 mg, Oral, Q4H PRN, Harris Hernandez MD, 5 mg at 03/26/24 0937    pantoprazole injection 40 mg, 40 mg, Intravenous, BID, Cassidy Obrien MD, 40 mg at 03/26/24 0937    scopolamine 1.3-1.5 mg (1 mg over 3 days) 1 patch, 1 patch, Transdermal, Q3 Days, Joe Clarke MD, 1 patch at 03/23/24 1502    sodium chloride 0.9% flush 10 mL, 10 mL, Intravenous, PRN, Narendra Pradhan, DO    sodium chloride 0.9% flush 10 mL, 10 mL, Intravenous, PRN, David Gonsalez MD    sucralfate tablet 1 g, 1 g, Oral, QID (AC & HS), Jenna Miller MD, 1 g at 03/25/24 0233    thiamine (B-1) 500 mg in dextrose 5 % (D5W) 100 mL  IVPB, 500 mg, Intravenous, TID, David Gonsalez MD, Stopped at 03/25/24 2319    Facility-Administered Medications Ordered in Other Encounters:     0.9%  NaCl infusion, , Intravenous, Continuous, Shannon Milligan MD, Last Rate: 10 mL/hr at 03/09/23 1020, New Bag at 03/09/23 1020    diphenhydrAMINE injection 25 mg, 25 mg, Intravenous, Once PRN, Shannon Milligan MD    LIDOcaine (PF) 10 mg/ml (1%) injection 10 mg, 1 mL, Intradermal, Once, Lanette Ulloa FNP    LIDOcaine (PF) 10 mg/ml (1%) injection 10 mg, 1 mL, Intradermal, Once, Shannon Milligan MD    ondansetron injection 4 mg, 4 mg, Intravenous, Once, Shannon Milligan MD    prochlorperazine injection Soln 5 mg, 5 mg, Intravenous, Once PRN, Shannon Milligan MD  VTE Risk Mitigation (From admission, onward)           Ordered     enoxaparin injection 90 mg  Every 12 hours         03/24/24 0901     IP VTE LOW RISK PATIENT  Once         03/19/24 0422     Place sequential compression device  Until discontinued         03/18/24 0124                  Assessment:   Cardiac Arrest/VT (Post Operative Left Héctor Orchiectomy - in PACU 3.18.24)     - Requiring Multiple Defibrillations (x 3)  Anemia - Stable    - Cleared by GI for DAPT/AC    - Requiring Transfusion    - EGD (3.22.24) - LA Grade B Reflux Esophagitis with No Bleeding, Chronic Gastritis, Protonix; Colonoscopy as OP 2/2 Scrotal Wound  Chronic Atrial Fibrillation - Now AF CVR (HR 50's-60s with Intermittent PVCS)    - Xarelto on Hold Post Scrotal Abscess I&D (On FD Lovenox)  NSTEMI (Unspecified for Now)  Cardiomyopathy (Unspecified)    - EF 30-40%  CAD (Details Unclear)  Acute Hypoxemic Respiratory Failure requiring Intubation/Ventilation - Now Extubated on Supplemental O2  Hypotension Requiring Vasopressor Support - Resolved     - History of Hypertension  Valvular Heart Disease    - MR: Moderate, TR: Mild to Moderate  Hyperlipidemia    - On Statin  SIRS - Likely UTI Related - Resolved     - BC x  2 Negative at 5 Days  Scrotal Abscess    - Status Post Surgical Exploration, Left Orchiectomy, & Debridement of Wound/Wound Packing (3.18.24)   COPD  Partial Bowel Obstruction - Resolved   Long Term Oral Anticoagulation  PAD  Infrarenal Abdominal Aortic Aneurysmal Dilatation with Mural Thrombus (Stable)    - Maximum diameter is 3.6 cm without significant interval change   Acute Human Metapneumovirus Infection (On Droplet Precautions) - Off Precautions    Plan:   Continue Oral Amiodarone Load - No Further Episodes of VT  GI Cleared PT for DAPT/AC   H&H Stable   Keep K > 4.0 and Mg > 2.0   Antibiotic Management/Wound Care as per Primary Team  Keep NPO  Schedule/Consent for LHC with Possible PTCA +/- Stenting  Risk, Benefits and Alternatives Reviewed and Discussed with the PT and their Family and they wish to proceed with above Procedure.   Labs in AM: CBC, CMP and Mg    RAN Kim  Cardiology  [facility]  03/26/2024     Physician addendum:  I have seen and examined this patient as a split-shared visit with the FRANKIE d/t complicated medical management of above problems written in assessment and high acuity requiring physician expertise in medical decision-making. I performed the substantive portion of the history and exam. Above medical decision-making is also formulated by me.    Cardiovascular exam:  S1, S2  Lungs:  fine crackles at bases.  Extremities:  1+ edema bilaterally    Plan:  Continue medications as above.  Antibiotic management as per primary team.  Plan for left heart catheterization today.      Valerio Lyn MD  Cardiologist

## 2024-03-26 NOTE — NURSING
Nurses Note -- 4 Eyes      3/26/2024   6:00 PM      Skin assessed during: Transfer      [] No Altered Skin Integrity Present    []Prevention Measures Documented      [x] Yes- Altered Skin Integrity Present or Discovered   [x] LDA Added if Not in Epic (Describe Wound)   [] New Altered Skin Integrity was Present on Admit and Documented in LDA   [x] Wound Image Taken    Wound Care Consulted? Yes    Attending Nurse:  Brittany Hollis RN/Staff Member: Dayanna

## 2024-03-27 LAB
ALBUMIN SERPL-MCNC: 2.4 G/DL (ref 3.4–4.8)
ALBUMIN/GLOB SERPL: 0.6 RATIO (ref 1.1–2)
ALP SERPL-CCNC: 68 UNIT/L (ref 40–150)
ALT SERPL-CCNC: 9 UNIT/L (ref 0–55)
AST SERPL-CCNC: 18 UNIT/L (ref 5–34)
BASOPHILS # BLD AUTO: 0.04 X10(3)/MCL
BASOPHILS NFR BLD AUTO: 0.5 %
BILIRUB SERPL-MCNC: 0.8 MG/DL
BSA FOR ECHO PROCEDURE: 2.12 M2
BUN SERPL-MCNC: 9.7 MG/DL (ref 8.4–25.7)
CALCIUM SERPL-MCNC: 8.7 MG/DL (ref 8.8–10)
CHLORIDE SERPL-SCNC: 106 MMOL/L (ref 98–107)
CO2 SERPL-SCNC: 24 MMOL/L (ref 23–31)
CREAT SERPL-MCNC: 1.05 MG/DL (ref 0.73–1.18)
EOSINOPHIL # BLD AUTO: 0.1 X10(3)/MCL (ref 0–0.9)
EOSINOPHIL NFR BLD AUTO: 1.3 %
ERYTHROCYTE [DISTWIDTH] IN BLOOD BY AUTOMATED COUNT: 16.9 % (ref 11.5–17)
GFR SERPLBLD CREATININE-BSD FMLA CKD-EPI: >60 MLS/MIN/1.73/M2
GLOBULIN SER-MCNC: 4.1 GM/DL (ref 2.4–3.5)
GLUCOSE SERPL-MCNC: 92 MG/DL (ref 82–115)
HCT VFR BLD AUTO: 26.6 % (ref 42–52)
HGB BLD-MCNC: 8.5 G/DL (ref 14–18)
IMM GRANULOCYTES # BLD AUTO: 0.07 X10(3)/MCL (ref 0–0.04)
IMM GRANULOCYTES NFR BLD AUTO: 0.9 %
LYMPHOCYTES # BLD AUTO: 1.6 X10(3)/MCL (ref 0.6–4.6)
LYMPHOCYTES NFR BLD AUTO: 20.5 %
MAGNESIUM SERPL-MCNC: 2 MG/DL (ref 1.6–2.6)
MCH RBC QN AUTO: 30.6 PG (ref 27–31)
MCHC RBC AUTO-ENTMCNC: 32 G/DL (ref 33–36)
MCV RBC AUTO: 95.7 FL (ref 80–94)
MONOCYTES # BLD AUTO: 0.59 X10(3)/MCL (ref 0.1–1.3)
MONOCYTES NFR BLD AUTO: 7.6 %
NEUTROPHILS # BLD AUTO: 5.41 X10(3)/MCL (ref 2.1–9.2)
NEUTROPHILS NFR BLD AUTO: 69.2 %
NRBC BLD AUTO-RTO: 0 %
OHS QRS DURATION: 94 MS
OHS QTC CALCULATION: 508 MS
PHOSPHATE SERPL-MCNC: 2.9 MG/DL (ref 2.3–4.7)
PLATELET # BLD AUTO: 397 X10(3)/MCL (ref 130–400)
PMV BLD AUTO: 9.6 FL (ref 7.4–10.4)
POTASSIUM SERPL-SCNC: 3.7 MMOL/L (ref 3.5–5.1)
PROT SERPL-MCNC: 6.5 GM/DL (ref 5.8–7.6)
RBC # BLD AUTO: 2.78 X10(6)/MCL (ref 4.7–6.1)
SODIUM SERPL-SCNC: 140 MMOL/L (ref 136–145)
TRIGL SERPL-MCNC: 60 MG/DL (ref 34–140)
WBC # SPEC AUTO: 7.81 X10(3)/MCL (ref 4.5–11.5)

## 2024-03-27 PROCEDURE — 25000003 PHARM REV CODE 250: Performed by: INTERNAL MEDICINE

## 2024-03-27 PROCEDURE — 63600175 PHARM REV CODE 636 W HCPCS: Performed by: INTERNAL MEDICINE

## 2024-03-27 PROCEDURE — 85025 COMPLETE CBC W/AUTO DIFF WBC: CPT

## 2024-03-27 PROCEDURE — 84100 ASSAY OF PHOSPHORUS: CPT

## 2024-03-27 PROCEDURE — C9113 INJ PANTOPRAZOLE SODIUM, VIA: HCPCS | Performed by: INTERNAL MEDICINE

## 2024-03-27 PROCEDURE — 25000003 PHARM REV CODE 250

## 2024-03-27 PROCEDURE — 93005 ELECTROCARDIOGRAM TRACING: CPT

## 2024-03-27 PROCEDURE — 93010 ELECTROCARDIOGRAM REPORT: CPT | Mod: ,,, | Performed by: INTERNAL MEDICINE

## 2024-03-27 PROCEDURE — 99900031 HC PATIENT EDUCATION (STAT)

## 2024-03-27 PROCEDURE — 84478 ASSAY OF TRIGLYCERIDES: CPT

## 2024-03-27 PROCEDURE — 21400001 HC TELEMETRY ROOM

## 2024-03-27 PROCEDURE — 25000003 PHARM REV CODE 250: Performed by: STUDENT IN AN ORGANIZED HEALTH CARE EDUCATION/TRAINING PROGRAM

## 2024-03-27 PROCEDURE — 63600175 PHARM REV CODE 636 W HCPCS

## 2024-03-27 PROCEDURE — 27000221 HC OXYGEN, UP TO 24 HOURS

## 2024-03-27 PROCEDURE — 99900035 HC TECH TIME PER 15 MIN (STAT)

## 2024-03-27 PROCEDURE — 80053 COMPREHEN METABOLIC PANEL: CPT

## 2024-03-27 PROCEDURE — 25000242 PHARM REV CODE 250 ALT 637 W/ HCPCS: Performed by: INTERNAL MEDICINE

## 2024-03-27 PROCEDURE — A4216 STERILE WATER/SALINE, 10 ML: HCPCS | Performed by: STUDENT IN AN ORGANIZED HEALTH CARE EDUCATION/TRAINING PROGRAM

## 2024-03-27 PROCEDURE — 94640 AIRWAY INHALATION TREATMENT: CPT

## 2024-03-27 PROCEDURE — 83735 ASSAY OF MAGNESIUM: CPT

## 2024-03-27 PROCEDURE — 94760 N-INVAS EAR/PLS OXIMETRY 1: CPT

## 2024-03-27 RX ORDER — AMIODARONE HYDROCHLORIDE 200 MG/1
400 TABLET ORAL 2 TIMES DAILY
Status: COMPLETED | OUTPATIENT
Start: 2024-03-27 | End: 2024-03-30

## 2024-03-27 RX ORDER — AMIODARONE HYDROCHLORIDE 200 MG/1
200 TABLET ORAL 2 TIMES DAILY
Status: COMPLETED | OUTPATIENT
Start: 2024-03-30 | End: 2024-04-02

## 2024-03-27 RX ORDER — AMIODARONE HYDROCHLORIDE 200 MG/1
200 TABLET ORAL DAILY
Status: DISCONTINUED | OUTPATIENT
Start: 2024-04-03 | End: 2024-04-02

## 2024-03-27 RX ADMIN — Medication 6 MG: at 08:03

## 2024-03-27 RX ADMIN — OXYCODONE HYDROCHLORIDE 5 MG: 5 TABLET ORAL at 02:03

## 2024-03-27 RX ADMIN — ENOXAPARIN SODIUM 90 MG: 100 INJECTION SUBCUTANEOUS at 08:03

## 2024-03-27 RX ADMIN — THIAMINE HYDROCHLORIDE 500 MG: 100 INJECTION, SOLUTION INTRAMUSCULAR; INTRAVENOUS at 08:03

## 2024-03-27 RX ADMIN — AMIODARONE HYDROCHLORIDE 400 MG: 200 TABLET ORAL at 08:03

## 2024-03-27 RX ADMIN — MORPHINE SULFATE 2 MG: 4 INJECTION, SOLUTION INTRAMUSCULAR; INTRAVENOUS at 01:03

## 2024-03-27 RX ADMIN — IPRATROPIUM BROMIDE AND ALBUTEROL SULFATE 3 ML: 2.5; .5 SOLUTION RESPIRATORY (INHALATION) at 11:03

## 2024-03-27 RX ADMIN — ATORVASTATIN CALCIUM 80 MG: 40 TABLET, FILM COATED ORAL at 08:03

## 2024-03-27 RX ADMIN — CHLORHEXIDINE GLUCONATE 0.12% ORAL RINSE 15 ML: 1.2 LIQUID ORAL at 08:03

## 2024-03-27 RX ADMIN — FOLIC ACID 1 MG: 1 TABLET ORAL at 08:03

## 2024-03-27 RX ADMIN — SUCRALFATE 1 G: 1 TABLET ORAL at 08:03

## 2024-03-27 RX ADMIN — PANTOPRAZOLE SODIUM 40 MG: 40 INJECTION, POWDER, FOR SOLUTION INTRAVENOUS at 07:03

## 2024-03-27 RX ADMIN — IPRATROPIUM BROMIDE AND ALBUTEROL SULFATE 3 ML: 2.5; .5 SOLUTION RESPIRATORY (INHALATION) at 04:03

## 2024-03-27 RX ADMIN — PANTOPRAZOLE SODIUM 40 MG: 40 INJECTION, POWDER, FOR SOLUTION INTRAVENOUS at 09:03

## 2024-03-27 RX ADMIN — AMOXICILLIN AND CLAVULANATE POTASSIUM 1 TABLET: 875; 125 TABLET, FILM COATED ORAL at 08:03

## 2024-03-27 RX ADMIN — MIDODRINE HYDROCHLORIDE 10 MG: 5 TABLET ORAL at 03:03

## 2024-03-27 RX ADMIN — Medication 10 ML: at 08:03

## 2024-03-27 RX ADMIN — SUCRALFATE 1 G: 1 TABLET ORAL at 11:03

## 2024-03-27 RX ADMIN — SUCRALFATE 1 G: 1 TABLET ORAL at 03:03

## 2024-03-27 RX ADMIN — MIDODRINE HYDROCHLORIDE 10 MG: 5 TABLET ORAL at 11:03

## 2024-03-27 RX ADMIN — OXYCODONE HYDROCHLORIDE 5 MG: 5 TABLET ORAL at 08:03

## 2024-03-27 RX ADMIN — THERA TABS 1 TABLET: TAB at 08:03

## 2024-03-27 RX ADMIN — IPRATROPIUM BROMIDE AND ALBUTEROL SULFATE 3 ML: 2.5; .5 SOLUTION RESPIRATORY (INHALATION) at 08:03

## 2024-03-27 RX ADMIN — IPRATROPIUM BROMIDE AND ALBUTEROL SULFATE 3 ML: 2.5; .5 SOLUTION RESPIRATORY (INHALATION) at 07:03

## 2024-03-27 RX ADMIN — THIAMINE HYDROCHLORIDE 500 MG: 100 INJECTION, SOLUTION INTRAMUSCULAR; INTRAVENOUS at 03:03

## 2024-03-27 NOTE — PROGRESS NOTES
"  [facility]    Cardiology  Progress Note    Patient Name: Ran Reyes Jr.  MRN: [id number]  Admission Date: 3/17/2024  Hospital Length of Stay: 9 days  Code Status: Full Code   Attending Physician: Tha Edmond MD   Primary Care Physician: Abiodun Recinos DO  Expected Discharge Date:   Principal Problem:Scrotal hematoma    Subjective:   Chief Complaint/Reason for Consult: OAC recs     HPI: Ran Reyes Jr. Is a 68 year old male, known to Dr. Wagner, with PMH of  cardiomyopathy, systolic heart failure with an EF of 41%, VHD, chronic AFib on Xarelto, peripheral arterial disease, COPD, HTN, and a large left testicle hydrocele who presented to the ED 3/18/24 for scrotal bleeding with testicular swelling and pain. Found to have sepsis likely due to UTI, acute bronchitis. Also found in ED to be in Afib RVR on EKG, bp 90s/60s. BNP 1273, troponin 0.045 --> 0.055. Patient admits to dyspnea, cough, and wheezing. Denies chest pains. Cardiology being consulted for OAC recommendations.    Hospital Course:  3.19.24: Intubated/Mechanical Ventilation. AF SVR. On Vasopressor Support. Family at bedside.   3.20.24: NAD. Vented/Sedated. PAF/CVR. Dobutamine 2.5mcg/kg/min. Amiodarone 0.5mg/min.   3.21.24: NAD. Extubated. "I am ok." PAF/CVR. Levophed 0.01mcg/kg/min. Oral Amiodarone.   3.22.24: NAD. "I am feeling better." PAF/SVR but Mostly CVR. Off Pressors.   3.23.24: NAD. "I am good." PAF/SVR - Mostly CVR. No Profound Bradycardia since Yesterday. Remains off Pressors.   3.24.24: NAD. "I am fine." PAF/SVR, Mostly CVR in 60s, PVCs. H&H 7.3/23.3 - Transfusion of PRBCs.  3.25.24: NAD. "I am ok." PAF/SVR. Mostly CVR 60s-70s. H&H 8.7/26.2; GI Clear for DAPT/AC  3.26.24: NAD. Sitting in Bedside Chair. "I am feeling pretty good." H&H 8.5/26.8 s/p 1 Unit PRBCS on 3.25.24. Simple Face Mask. Denies CP, SOB and Palps.   3.27.24: NAD. Denies CP, SOB, or palps. Right wrist benign. "I feel good." H&H stable. On 5 L face mask.    "   PMH: Cardiomyopathy, systolic heart failure with an EF of 41%, VHD, chronic AFib on Xarelto, peripheral arterial disease, COPD, HTN, CAD (Details Unclear)  PSH: cardiac stent >10 years ago, L FA atherectomy and angioplasty, R SFA stent, cataract extraction,   Family History: Father MI at age 66.   Social History: 10+ pack year hx current smoker, social EtOH, denies drugs.     Previous Cardiac Diagnostics:   [facility] (3.26.24):  Coronary findings:  Dominance: right   Left main:  10-20% calcified distal left main disease  Left anterior descending artery:  50% calcified proximal stenosis  Circumflex artery:  Luminal irregularities  Right coronary artery:  Luminal irregularities    Echocardiogram (3.19.24):  Left Ventricle: The left ventricle is normal in size. Increased wall thickness. Unable to assess wall motion. There is moderately reduced systolic function with a visually estimated ejection fraction of 30 - 40%.  Right Ventricle: Mild right ventricular enlargement.  Left Atrium: Left atrium is moderately dilated.  Right Atrium: Right atrium is severely dilated.  Mitral Valve: There is moderate regurgitation.  Tricuspid Valve: There is mild to moderate regurgitation.  Pulmonary Artery: Pulmonary artery pressure could not be estimated.  IVC/SVC: Patient is ventilated, cannot use IVC diameter to estimate right atrial pressure.    Echocardiogram (1.31.24):   Left Ventricle: The left ventricle is normal in size. Mildly increased wall thickness. There is reduced systolic function. Biplane (2D) method of discs ejection fraction is 41%. Unable to assess diastolic function due to atrial fibrillation.  Right Ventricle: Normal right ventricular cavity size. Systolic function is mildly reduced.  Left Atrium: Left atrium is severely dilated.  Right Atrium: Right atrium is severely dilated.  Aortic Valve: The aortic valve is a trileaflet valve.  Mitral Valve: There is bileaflet sclerosis. There is moderate to severe  regurgitation.  Tricuspid Valve: There is mild regurgitation.  IVC/SVC: Normal venous pressure at 3 mmHg.    Carotid US (2.7.23):  The study quality is average.   1-39% stenosis in the proximal right internal carotid artery based on Bluth Criteria. Antegrade right vertebral artery flow.   1-39% stenosis in the proximal left internal carotid artery based on Bluth Criteria. Antegrade left vertebral artery flow.     Echocardiogram (11.8.22):  The study quality is average.   The left ventricle is moderately enlarged. Left ventricular diastolic dimension is 6.4 cms. Global left ventricular systolic function is moderately decreased. The left ventricular ejection fraction is 40%. Arrhythmia noted throughout much of the exam.   There is no evidence of mitral stenosis or prolapse appreciated. MV Ortiz score ~ 5. The area by planimetry is 5.3 cm². The mean trans mitral gradient is 1.6 mmHg. Suspect borderline severe (4+) mitral regurgitation with a posteriorly directed jet. MR PISA radius ~ 0.93 cm, MR ERO ~ 0.35 cm^2, MR regurgitant volume ~ 72 ml/beat, MR regurgitant fraction ~ 55%, MR vena contracta ~ 0.54 cm.  Mild calcification of the aortic valve is noted with adequate cuspal excursion.   Trace pulmonic regurgitation. Mild (1+) tricuspid regurgitation.    Peripheral Angiogram (10.17.22):  Underwent Successful CSI Atherectomy Balloon Angioplasty and Stenting of the Right SFA and CSI Atherectomy Balloon Angioplasty of Right Anterior Tibial Artery Using Pedal Approach.    Peripheral Angiogram (9.12.22):  Underwent Successful Left SFA Laser Atherectomy Balloon Angioplasty Using Left Radial Artery Approach.    Review of Systems   Constitutional: Positive for malaise/fatigue. Negative for fever.   Cardiovascular:  Negative for chest pain and leg swelling.   Respiratory:  Negative for shortness of breath.    Skin:         Scrotal Wound   All other systems reviewed and are negative.    Objective:     Vital Signs (Most  Recent):  Temp: 97.7 °F (36.5 °C) (03/27/24 1410)  Pulse: 81 (03/27/24 1618)  Resp: 19 (03/27/24 1618)  BP: (!) 120/57 (03/27/24 1410)  SpO2: 96 % (03/27/24 1618) Vital Signs (24h Range):  Temp:  [97.3 °F (36.3 °C)-98.2 °F (36.8 °C)] 97.7 °F (36.5 °C)  Pulse:  [60-98] 81  Resp:  [18-25] 19  SpO2:  [87 %-98 %] 96 %  BP: ()/(57-84) 120/57   Weight: 95 kg (209 lb 7 oz)  Body mass index is 29.21 kg/m².  SpO2: 96 %       Intake/Output Summary (Last 24 hours) at 3/27/2024 1627  Last data filed at 3/27/2024 1324  Gross per 24 hour   Intake 1080 ml   Output 1575 ml   Net -495 ml       Lines/Drains/Airways       Drain  Duration                  Urethral Catheter 03/18/24 1426 Straight-tip 16 Fr. 9 days              Peripheral Intravenous Line  Duration                  Peripheral IV - Single Lumen 03/24/24 1634 18 G Anterior;Left Forearm 2 days                  Significant Labs:   Recent Results (from the past 72 hour(s))   Phosphorus    Collection Time: 03/25/24  2:41 AM   Result Value Ref Range    Phosphorus Level 3.3 2.3 - 4.7 mg/dL   Magnesium    Collection Time: 03/25/24  2:41 AM   Result Value Ref Range    Magnesium Level 2.10 1.60 - 2.60 mg/dL   Comprehensive metabolic panel    Collection Time: 03/25/24  2:41 AM   Result Value Ref Range    Sodium Level 145 136 - 145 mmol/L    Potassium Level 4.2 3.5 - 5.1 mmol/L    Chloride 106 98 - 107 mmol/L    Carbon Dioxide 26 23 - 31 mmol/L    Glucose Level 104 82 - 115 mg/dL    Blood Urea Nitrogen 16.5 8.4 - 25.7 mg/dL    Creatinine 1.29 (H) 0.73 - 1.18 mg/dL    Calcium Level Total 8.3 (L) 8.8 - 10.0 mg/dL    Protein Total 6.1 5.8 - 7.6 gm/dL    Albumin Level 2.4 (L) 3.4 - 4.8 g/dL    Globulin 3.7 (H) 2.4 - 3.5 gm/dL    Albumin/Globulin Ratio 0.6 (L) 1.1 - 2.0 ratio    Bilirubin Total 0.8 <=1.5 mg/dL    Alkaline Phosphatase 67 40 - 150 unit/L    Alanine Aminotransferase 9 0 - 55 unit/L    Aspartate Aminotransferase 19 5 - 34 unit/L    eGFR >60 mls/min/1.73/m2    Triglycerides    Collection Time: 03/25/24  2:41 AM   Result Value Ref Range    Triglyceride 64 34 - 140 mg/dL   CBC with Differential    Collection Time: 03/25/24  2:44 AM   Result Value Ref Range    WBC 7.87 4.50 - 11.50 x10(3)/mcL    RBC 2.78 (L) 4.70 - 6.10 x10(6)/mcL    Hgb 8.7 (L) 14.0 - 18.0 g/dL    Hct 26.2 (L) 42.0 - 52.0 %    MCV 94.2 (H) 80.0 - 94.0 fL    MCH 31.3 (H) 27.0 - 31.0 pg    MCHC 33.2 33.0 - 36.0 g/dL    RDW 17.2 (H) 11.5 - 17.0 %    Platelet 407 (H) 130 - 400 x10(3)/mcL    MPV 9.8 7.4 - 10.4 fL    Neut % 71.5 %    Lymph % 16.1 %    Mono % 9.3 %    Eos % 0.8 %    Basophil % 0.4 %    Lymph # 1.27 0.6 - 4.6 x10(3)/mcL    Neut # 5.63 2.1 - 9.2 x10(3)/mcL    Mono # 0.73 0.1 - 1.3 x10(3)/mcL    Eos # 0.06 0 - 0.9 x10(3)/mcL    Baso # 0.03 <=0.2 x10(3)/mcL    IG# 0.15 (H) 0 - 0.04 x10(3)/mcL    IG% 1.9 %    NRBC% 0.3 %   Uric Acid    Collection Time: 03/25/24  1:44 PM   Result Value Ref Range    Uric Acid 3.1 (L) 3.5 - 7.2 mg/dL   Sodium, Random Urine    Collection Time: 03/25/24  5:56 PM   Result Value Ref Range    Urine Sodium 75.0 mmol/L   Creatinine, Random Urine    Collection Time: 03/25/24  5:56 PM   Result Value Ref Range    Urine Creatinine 143.4 63.0 - 166.0 mg/dL   Urea Nitrogen, Random Urine    Collection Time: 03/25/24  5:56 PM   Result Value Ref Range    Urine Urea Nitrogen 677.0 mg/dL   Osmolality, Urine    Collection Time: 03/25/24  5:56 PM   Result Value Ref Range    Urine Osmolality 527 300 - 1,300 mOsm/kg   Triglycerides    Collection Time: 03/26/24  4:49 AM   Result Value Ref Range    Triglyceride 58 34 - 140 mg/dL   Comprehensive metabolic panel    Collection Time: 03/26/24  4:49 AM   Result Value Ref Range    Sodium Level 142 136 - 145 mmol/L    Potassium Level 4.0 3.5 - 5.1 mmol/L    Chloride 107 98 - 107 mmol/L    Carbon Dioxide 26 23 - 31 mmol/L    Glucose Level 89 82 - 115 mg/dL    Blood Urea Nitrogen 12.4 8.4 - 25.7 mg/dL    Creatinine 1.14 0.73 - 1.18 mg/dL    Calcium  Level Total 8.1 (L) 8.8 - 10.0 mg/dL    Protein Total 5.8 5.8 - 7.6 gm/dL    Albumin Level 2.4 (L) 3.4 - 4.8 g/dL    Globulin 3.4 2.4 - 3.5 gm/dL    Albumin/Globulin Ratio 0.7 (L) 1.1 - 2.0 ratio    Bilirubin Total 0.9 <=1.5 mg/dL    Alkaline Phosphatase 67 40 - 150 unit/L    Alanine Aminotransferase 10 0 - 55 unit/L    Aspartate Aminotransferase 18 5 - 34 unit/L    eGFR >60 mls/min/1.73/m2   Magnesium    Collection Time: 03/26/24  4:49 AM   Result Value Ref Range    Magnesium Level 2.00 1.60 - 2.60 mg/dL   Phosphorus    Collection Time: 03/26/24  4:49 AM   Result Value Ref Range    Phosphorus Level 3.0 2.3 - 4.7 mg/dL   CBC with Differential    Collection Time: 03/26/24  4:49 AM   Result Value Ref Range    WBC 7.64 4.50 - 11.50 x10(3)/mcL    RBC 2.82 (L) 4.70 - 6.10 x10(6)/mcL    Hgb 8.5 (L) 14.0 - 18.0 g/dL    Hct 26.8 (L) 42.0 - 52.0 %    MCV 95.0 (H) 80.0 - 94.0 fL    MCH 30.1 27.0 - 31.0 pg    MCHC 31.7 (L) 33.0 - 36.0 g/dL    RDW 16.8 11.5 - 17.0 %    Platelet 381 130 - 400 x10(3)/mcL    MPV 9.6 7.4 - 10.4 fL    Neut % 66.7 %    Lymph % 24.1 %    Mono % 6.8 %    Eos % 1.0 %    Basophil % 0.5 %    Lymph # 1.84 0.6 - 4.6 x10(3)/mcL    Neut # 5.09 2.1 - 9.2 x10(3)/mcL    Mono # 0.52 0.1 - 1.3 x10(3)/mcL    Eos # 0.08 0 - 0.9 x10(3)/mcL    Baso # 0.04 <=0.2 x10(3)/mcL    IG# 0.07 (H) 0 - 0.04 x10(3)/mcL    IG% 0.9 %    NRBC% 0.0 %   EKG 12-lead    Collection Time: 03/26/24  5:46 AM   Result Value Ref Range    QRS Duration 96 ms    [facility] QTC Calculation 503 ms   Triglycerides    Collection Time: 03/27/24  4:22 AM   Result Value Ref Range    Triglyceride 60 34 - 140 mg/dL   Comprehensive metabolic panel    Collection Time: 03/27/24  4:22 AM   Result Value Ref Range    Sodium Level 140 136 - 145 mmol/L    Potassium Level 3.7 3.5 - 5.1 mmol/L    Chloride 106 98 - 107 mmol/L    Carbon Dioxide 24 23 - 31 mmol/L    Glucose Level 92 82 - 115 mg/dL    Blood Urea Nitrogen 9.7 8.4 - 25.7 mg/dL    Creatinine 1.05 0.73 - 1.18  mg/dL    Calcium Level Total 8.7 (L) 8.8 - 10.0 mg/dL    Protein Total 6.5 5.8 - 7.6 gm/dL    Albumin Level 2.4 (L) 3.4 - 4.8 g/dL    Globulin 4.1 (H) 2.4 - 3.5 gm/dL    Albumin/Globulin Ratio 0.6 (L) 1.1 - 2.0 ratio    Bilirubin Total 0.8 <=1.5 mg/dL    Alkaline Phosphatase 68 40 - 150 unit/L    Alanine Aminotransferase 9 0 - 55 unit/L    Aspartate Aminotransferase 18 5 - 34 unit/L    eGFR >60 mls/min/1.73/m2   Magnesium    Collection Time: 03/27/24  4:22 AM   Result Value Ref Range    Magnesium Level 2.00 1.60 - 2.60 mg/dL   Phosphorus    Collection Time: 03/27/24  4:22 AM   Result Value Ref Range    Phosphorus Level 2.9 2.3 - 4.7 mg/dL   CBC with Differential    Collection Time: 03/27/24  4:22 AM   Result Value Ref Range    WBC 7.81 4.50 - 11.50 x10(3)/mcL    RBC 2.78 (L) 4.70 - 6.10 x10(6)/mcL    Hgb 8.5 (L) 14.0 - 18.0 g/dL    Hct 26.6 (L) 42.0 - 52.0 %    MCV 95.7 (H) 80.0 - 94.0 fL    MCH 30.6 27.0 - 31.0 pg    MCHC 32.0 (L) 33.0 - 36.0 g/dL    RDW 16.9 11.5 - 17.0 %    Platelet 397 130 - 400 x10(3)/mcL    MPV 9.6 7.4 - 10.4 fL    Neut % 69.2 %    Lymph % 20.5 %    Mono % 7.6 %    Eos % 1.3 %    Basophil % 0.5 %    Lymph # 1.60 0.6 - 4.6 x10(3)/mcL    Neut # 5.41 2.1 - 9.2 x10(3)/mcL    Mono # 0.59 0.1 - 1.3 x10(3)/mcL    Eos # 0.10 0 - 0.9 x10(3)/mcL    Baso # 0.04 <=0.2 x10(3)/mcL    IG# 0.07 (H) 0 - 0.04 x10(3)/mcL    IG% 0.9 %    NRBC% 0.0 %   Echo Saline Bubble? No    Collection Time: 03/27/24 10:57 AM   Result Value Ref Range    BSA 2.12 m2     Telemetry: PAF/CVR    Physical Exam  Vitals reviewed.   Constitutional:       General: He is not in acute distress.     Appearance: Normal appearance. He is ill-appearing.   HENT:      Head: Normocephalic.      Mouth/Throat:      Mouth: Mucous membranes are moist.   Eyes:      Conjunctiva/sclera: Conjunctivae normal.   Cardiovascular:      Rate and Rhythm: Normal rate. Rhythm irregular.      Heart sounds: Murmur heard.      Comments: Right wrist soft, nontender.  No hematoma noted. + 2 peripheral pulse  Pulmonary:      Effort: Pulmonary effort is normal. No respiratory distress.      Breath sounds: Decreased breath sounds present.      Comments: Simple Face Mask  Genitourinary:     Comments: Scrotal Sling in Place, Dressing C/D/I  Musculoskeletal:         General: No swelling.      Right lower leg: No edema.      Left lower leg: No edema.   Neurological:      General: No focal deficit present.      Mental Status: He is alert and oriented to person, place, and time.   Psychiatric:         Mood and Affect: Mood normal.         Behavior: Behavior normal.         Judgment: Judgment normal.       Current Inpatient Medications:    Current Facility-Administered Medications:     0.9%  NaCl infusion (for blood administration), , Intravenous, Q24H PRN, Carmen Griggs FNP    0.9%  NaCl infusion (for blood administration), , Intravenous, Q24H PRN, Joshua Hernandez ANP    acetaminophen tablet 650 mg, 650 mg, Oral, Q8H PRN, Harris Hernandez MD    albuterol-ipratropium 2.5 mg-0.5 mg/3 mL nebulizer solution 3 mL, 3 mL, Nebulization, Once, Natali Seymour MD    albuterol-ipratropium 2.5 mg-0.5 mg/3 mL nebulizer solution 3 mL, 3 mL, Nebulization, Q4H, Natali Seymour MD, 3 mL at 03/27/24 1618    [START ON 3/30/2024] amiodarone tablet 200 mg, 200 mg, Oral, BID, Davida Mo FNP    [START ON 4/3/2024] amiodarone tablet 200 mg, 200 mg, Oral, Daily, Davida Mo FNP    amiodarone tablet 400 mg, 400 mg, Oral, BID, Davida Mo FNP    amoxicillin-clavulanate 875-125mg per tablet 1 tablet, 1 tablet, Oral, Q12H, Natali Seymour MD, 1 tablet at 03/27/24 0802    atorvastatin tablet 80 mg, 80 mg, Oral, Daily, Cassidy Obrien MD, 80 mg at 03/27/24 0802    chlorhexidine 0.12 % solution 15 mL, 15 mL, Mouth/Throat, BID, David Gonsalez MD, 15 mL at 03/27/24 0802    dextrose 10 % infusion, , Intravenous, PRN, David Gonsalez MD    dextrose 10 % infusion, , Intravenous, PRN,  David Gonsalez MD    dextrose 10% bolus 125 mL 125 mL, 12.5 g, Intravenous, PRN, David Gonsalez MD    dextrose 10% bolus 250 mL 250 mL, 25 g, Intravenous, PRN, David Gonsalez MD    enoxaparin injection 90 mg, 1 mg/kg, Subcutaneous, Q12H (prophylaxis, 0900/2100), Natali Seymour MD, 90 mg at 03/27/24 0801    folic acid tablet 1 mg, 1 mg, Oral, Daily, Jenna Miller MD, 1 mg at 03/27/24 0802    hydrALAZINE injection 10 mg, 10 mg, Intravenous, Q4H PRN, Adriano Montiel MD    insulin aspart U-100 injection 0-10 Units, 0-10 Units, Subcutaneous, Q6H PRN, Narendra Pradhan DO    ipratropium 0.02 % nebulizer solution 0.5 mg, 0.5 mg, Nebulization, Q6H PRN, David Gonsalez MD    LORazepam tablet 2 mg, 2 mg, Oral, Q4H PRN, Jenna Miller MD, 2 mg at 03/21/24 0031    melatonin tablet 6 mg, 6 mg, Oral, Nightly PRN, Harris Hernandez MD, 6 mg at 03/26/24 2247    midodrine tablet 10 mg, 10 mg, Oral, TID WM, Narendra Pradhan DO, 10 mg at 03/27/24 1519    morphine injection 2 mg, 2 mg, Intravenous, Q6H PRN, David Gonsalez MD, 2 mg at 03/27/24 1324    multivitamin tablet, 1 tablet, Oral, Daily, Jenna Miller MD, 1 tablet at 03/27/24 0802    ondansetron injection 4 mg, 4 mg, Intravenous, Q8H PRN, Ardiano Montiel MD    oxyCODONE immediate release tablet 5 mg, 5 mg, Oral, Q4H PRN, Harris Hernandez MD, 5 mg at 03/27/24 1418    pantoprazole injection 40 mg, 40 mg, Intravenous, BID, Cassidy Obrien MD, 40 mg at 03/27/24 0755    sodium chloride 0.9% flush 10 mL, 10 mL, Intravenous, PRN, Narendra Pradhan,     sodium chloride 0.9% flush 10 mL, 10 mL, Intravenous, PRN, David Gonsalez MD    sucralfate tablet 1 g, 1 g, Oral, QID (AC & HS), Jenna Miller MD, 1 g at 03/27/24 1519    thiamine (B-1) 500 mg in dextrose 5 % (D5W) 100 mL IVPB, 500 mg, Intravenous, TID, David Gonsalez MD, Stopped at 03/27/24 1550    Facility-Administered Medications Ordered in Other Encounters:     0.9%  NaCl  infusion, , Intravenous, Continuous, Shannon Milligan MD, Last Rate: 10 mL/hr at 03/09/23 1020, New Bag at 03/09/23 1020    diphenhydrAMINE injection 25 mg, 25 mg, Intravenous, Once PRN, Shannon Milligan MD    LIDOcaine (PF) 10 mg/ml (1%) injection 10 mg, 1 mL, Intradermal, Once, Lanette Ulloa FNP    LIDOcaine (PF) 10 mg/ml (1%) injection 10 mg, 1 mL, Intradermal, Once, Shannon Milligan MD    ondansetron injection 4 mg, 4 mg, Intravenous, Once, Shannon Milligan MD    prochlorperazine injection Soln 5 mg, 5 mg, Intravenous, Once PRN, Shannon Milligan MD  VTE Risk Mitigation (From admission, onward)           Ordered     enoxaparin injection 90 mg  Every 12 hours         03/24/24 0901     IP VTE LOW RISK PATIENT  Once         03/19/24 0422     Place sequential compression device  Until discontinued         03/18/24 0124                  Assessment:   Cardiac Arrest/VT (Post Operative Left Héctor Orchiectomy - in PACU 3.18.24)     - Requiring Multiple Defibrillations (x 3)  NSTEMI Type 2 in the Setting of Cardiac Arrest, Anemia  Anemia - Stable    - Cleared by GI for DAPT/AC    - Requiring Transfusion    - EGD (3.22.24) - LA Grade B Reflux Esophagitis with No Bleeding, Chronic Gastritis, Protonix; Colonoscopy as OP 2/2 Scrotal Wound  Chronic Atrial Fibrillation - Now AF CVR (HR 50's-60s with Intermittent PVCS)    - Xarelto on Hold Post Scrotal Abscess I&D (On FD Lovenox)  Non Ischemic Cardiomyopathy     - EF 30-40%  CAD (Details Unclear)  Acute Hypoxemic Respiratory Failure requiring Intubation/Ventilation - Now Extubated on Supplemental O2  Hypotension Requiring Vasopressor Support - Resolved     - History of Hypertension  Valvular Heart Disease    - MR: Moderate, TR: Mild to Moderate  Hyperlipidemia    - On Statin  SIRS - Likely UTI Related - Resolved     - BC x 2 Negative at 5 Days  Scrotal Abscess    - Status Post Surgical Exploration, Left Orchiectomy, & Debridement of Wound/Wound Packing  (3.18.24)   COPD  Partial Bowel Obstruction - Resolved   Long Term Oral Anticoagulation  PAD  Infrarenal Abdominal Aortic Aneurysmal Dilatation with Mural Thrombus (Stable)    - Maximum diameter is 3.6 cm without significant interval change   Acute Human Metapneumovirus Infection (On Droplet Precautions) - Off Precautions    Plan:   It appears amio was discontinued. Will reload with PO amio.  No Further Episodes of VT  GI Cleared PT for DAPT/AC.   H&H Stable   Keep K > 4.0 and Mg > 2.0   Antibiotic Management/Wound Care as per Primary Team  Obtain limited echo to evaluate LVEF to see if he qualifies for lifevest.   Labs in AM: CBC, CMP and Mg    MALI Solo  Cardiology  [facility]  03/27/2024   Physician addendum:  I have seen and examined this patient as a split-shared visit with the FRANKIE d/t complicated medical management of above problems written in assessment and high acuity requiring physician expertise in medical decision-making. I performed the substantive portion of the history and exam. Above medical decision-making is also formulated by me.    Cardiovascular exam:  S1, S2  Lungs:  fine crackles at bases.  Extremities:  1+ edema bilaterally    Plan:  Restart amiodarone.  Follow up echocardiogram.  Recommend lifevest if LV functio is persistently low.      Valerio Lyn MD  Cardiologist

## 2024-03-27 NOTE — PHYSICIAN QUERY
PT Name: Ran Reyes Jr.  MR #: [id number]     DOCUMENTATION CLARIFICATION   Mere Montoya RN, CCDS   [employer]  [email or web address]     This form is a permanent document in the medical record.     Query Date: March 27, 2024    By submitting this query, we are merely seeking further clarification of documentation.   Please utilize your independent clinical judgment when addressing the question(s) below.    The medical record reflects the following:     Indicators   Supporting Clinical Findings Location in Medical Record   x Bowel obstruction, intestinal obstruction, LBO or SBO documented some abd distention this AM, XR abd concerning for some colonic distention  Possible ileus given critical illness, no signs of mechanical obstruction noted.    -general surgery following for partial bowel obstruction    Partial Bowel Obstruction - Resolved  Sx cn 3/19      CC PN 3/19     Hosp PN 3/27      x Radiology findings 3/19 CT Abd  Stomach/bowel: A nasogastric tube has its tip in the stomach.No bowel obstruction.    XR Gastric tube check, non-radiologist performed  Final Result   Nasogastric tube terminates within the gastric fundus.   03/18/2024      XR Gastric tube check, non-radiologist performed  Final Result    Nasogastric tube terminates within the gastric fundus.   03/18/2024     3/19 CT        Gen Sx CN 3/19   x Treatment/  Medication continue bowel rest, NG with suction, and serial abdominal exams     -switch fentanyl drip to Versed drip given concomitant partial bowel obstruction     No emergent surgical therapy indicated - no s/s of Toxic Megacolon CC PN 3/19      3/19 Sx CN       x Procedure/Surgery s/p I&D LT joselito orchiectomy 03/18/2020   CC PN 3/19    Other  Abdomen is soft, minimally distended.    NG in place with minimal output.    Rectal exam performed, no blood, no significant stool in the vault, no palpable masses.     Anesthesia meds per Anesthesia chart:  fentaNYL (SUBLIMAZE)  "injection 100 mcg  propofol (DIPRIVAN) 10 mg/mL  mg   acetaminophen (OFIRMEV) (10 mg/mL) injection   1,000 mg   HYDROmorphone (DILAUDID) injection 2 mg/mL   2 mg     3/19 Sx CN          Anesthesia     Provider, please further specify - "partial Bowel Obstruction"  and  "possible Ileus"   [  x ] Partial or incomplete intestinal obstruction, due to medications - pain meds, sedatives, anesthesia     [   ] Partial or incomplete intestinal obstruction, due to other (please specify): ____________   [   ] Partial or incomplete intestinal obstruction, unknown or unspecified etiology     [   ] Postoperative partial or incomplete intestinal obstruction, a complication of procedure   [   ] Postoperative partial or incomplete intestinal obstruction, not a complication of procedure     [   ] Other intestinal condition (please specify): _____________________     [   ] Intestinal Obstruction Ruled Out     - however, Ileus ruled in - specify type: ______     [   ] Intestinal Obstruction & Ileus ruled out after study     [   ]  Clinically Undetermined       Please document in your progress notes daily for the duration of treatment until resolved, and include in your discharge summary.    Form No. [id number]  "

## 2024-03-27 NOTE — PROGRESS NOTES
Inpatient Nutrition Assessment    Admit Date: 3/17/2024   Total duration of encounter: 10 days   Patient Age: 68 y.o.    Nutrition Recommendation/Prescription     Continue current diet (Diet Heart Healthy) as tolerated.   Boost Plus (provides 360 kcal, 14 g protein per serving) TID (chocolate)  Ge (provides 90 kcal, 2.5 g protein per serving) BID.    Communication of Recommendations: reviewed with patient    Nutrition Assessment     Malnutrition Assessment/Nutrition-Focused Physical Exam    Malnutrition Context: acute illness or injury (03/19/24 1033)  Malnutrition Level:  (does not meet criteria) (03/19/24 1033)  Energy Intake (Malnutrition):  (unable to eval) (03/19/24 1033)  Weight Loss (Malnutrition):  (unable to eval) (03/19/24 1033)  Subcutaneous Fat (Malnutrition):  (does not meet criteria) (03/19/24 1033)           Muscle Mass (Malnutrition):  (does not meet criteria) (03/19/24 1033)                          Fluid Accumulation (Malnutrition):  (does not meet criteria) (03/19/24 1033)        A minimum of two characteristics is recommended for diagnosis of either severe or non-severe malnutrition.    Chart Review    Reason Seen: follow-up    Malnutrition Screening Tool Results   Have you recently lost weight without trying?: No  Have you been eating poorly because of a decreased appetite?: No   MST Score: 0   Diagnosis:  Scrotal abscess s/p I&D with left joselito orchiectomy 03/18/2024  Abnormal chest imaging with right lower lobe infiltrate, concern for post-viral pneumonia  Human metapneumovirus +ve  Acute hypoxic respiratory failure from above  Sepsis from above requiring vasopressor supoort  Recent cardiac arrest with ventricular tachycardia requiring defibrillation x3 (03/18/2024)  AFib RVR   NSTMI, unspecified  Partial bowel obstruction  Normocytic anemia.    FOBT positive - grade B reflux esophagitis, chronic gastritis, chronic duodenitis.  Electrolyte derangements   Abnormal CTA - abdominal aortic  aneurysmal dilatation  with mural thrombus  ELIZABETH    Relevant Medical History: nonischemic cardiomyopathy, systolic heart failure with an EF of 41%, VHD, chronic AFib on Xarelto, peripheral arterial disease, COPD, HTN, and a large left testicle hydrocel     Scheduled Medications:  albuterol-ipratropium, 3 mL, Once  albuterol-ipratropium, 3 mL, Q4H  amoxicillin-clavulanate 875-125mg, 1 tablet, Q12H  atorvastatin, 80 mg, Daily  chlorhexidine, 15 mL, BID  enoxparin, 1 mg/kg, Q12H (prophylaxis, 0900/2100)  folic acid, 1 mg, Daily  midodrine, 10 mg, TID WM  multivitamin, 1 tablet, Daily  pantoprazole, 40 mg, BID  sucralfate, 1 g, QID (AC & HS)  thiamine (B-1) 500 mg in dextrose 5 % (D5W) 100 mL IVPB, 500 mg, TID    Continuous Infusions: none       PRN Medications: 0.9%  NaCl infusion (for blood administration), 0.9%  NaCl infusion (for blood administration), acetaminophen, dextrose 10 % in water (D10W), dextrose 10 % in water (D10W), dextrose 10%, dextrose 10%, hydrALAZINE, insulin aspart U-100, ipratropium, LORazepam, melatonin, morphine, ondansetron, oxyCODONE, sodium chloride 0.9%, sodium chloride 0.9%    Calorie Containing IV Medications: no significant kcals from medications at this time    Recent Labs   Lab 03/21/24  0219 03/21/24  0220 03/21/24  0220 03/22/24  0120 03/23/24  0122 03/24/24  0153 03/24/24  1357 03/25/24  0241 03/25/24  0244 03/26/24  0449 03/27/24  0422     --   --  143 143 142  --  145  --  142 140   K 3.4*  --   --  3.2* 4.0 4.0  --  4.2  --  4.0 3.7   CALCIUM 7.7*  --   --  7.9* 8.2* 8.1*  --  8.3*  --  8.1* 8.7*   PHOS 1.7*  --   --  3.2 2.9 2.7  --  3.3  --  3.0 2.9   MG 2.20  --   --  2.10 2.40 2.10  --  2.10  --  2.00 2.00   CHLORIDE 101  --   --  106 106 105  --  106  --  107 106   CO2 29  --   --  26 25 26  --  26  --  26 24   BUN 10.6  --   --  7.2* 11.6 16.7  --  16.5  --  12.4 9.7   CREATININE 0.79  --   --  0.83 1.08 1.07  --  1.29*  --  1.14 1.05   EGFRNORACEVR >60  --   --  >60  ">60 >60  --  >60  --  >60 >60   GLUCOSE 104  --   --  108 121* 99  --  104  --  89 92   BILITOT 0.5  --   --  0.4 0.4 0.5  --  0.8  --  0.9 0.8   ALKPHOS 64  --   --  59 64 64  --  67  --  67 68   ALT 11  --   --  11 9 10  --  9  --  10 9   AST 21  --   --  20 21 19  --  19  --  18 18   ALBUMIN 2.1*  --   --  2.0* 2.2* 2.3*  --  2.4*  --  2.4* 2.4*   TRIG 104  --   --  69 61 58  --  64  --  58 60   WBC  --  9.75  --  8.83 8.72 8.44  --   --  7.87 7.64 7.81   HGB  --  8.6*   < > 8.4* 8.5* 7.3* 8.0*  --  8.7* 8.5* 8.5*   HCT  --  27.2*   < > 26.3* 26.2* 23.3* 25.1*  --  26.2* 26.8* 26.6*    < > = values in this interval not displayed.     Nutrition Orders:  Diet Heart Healthy  Dietary nutrition supplements Ge - Fruit Punch, Boost Plus Chocolate; TID     Appetite/Oral Intake: good/% of meals  Factors Affecting Nutritional Intake: none identified  Food/Temple/Cultural Preferences: unable to obtain  Food Allergies: none reported  Last Bowel Movement: 24  Wound(s): incision noted    Comments    3/19/24: Discussed with RN. Will provide tube feeding recommendations for when appropriate to start. No kcal from meds at this time. Noted partial bowel obstruction. Will monitor for need for PN.     3/21/24: Pt now extubated. Verified UBW. Plans for diet advancement. Full liquids just started. Pt willing to drink ONS. Will also send Ge for wound healing.     3/27/24 Patient reports a good appetite and good intake of meals/supplements.    Anthropometrics    Height: 5' 11" (180.3 cm), Height Method: Stated  Last Weight: 95 kg (209 lb 7 oz) (24 1315), Weight Method: Bed Scale  BMI (Calculated): 29.2  BMI Classification: overweight (BMI 25-29.9)        Ideal Body Weight (IBW), Male: 172 lb     % Ideal Body Weight, Male (lb): 115.1 %                 Usual Body Weight (UBW), k.45 kg-90.7 kg  % Usual Body Weight: 107.63  % Weight Change From Usual Weight: 7.4 %  Usual Weight Provided By: patient and " patient denies unintentional weight loss    Wt Readings from Last 5 Encounters:   03/27/24 95 kg (209 lb 7 oz)   01/31/24 92.1 kg (203 lb)   01/04/24 92.4 kg (203 lb 11.3 oz)   11/15/23 90.7 kg (200 lb)   09/20/23 91.2 kg (201 lb)     Weight Change(s) Since Admission:   (3/27) admission weight (77.1 kg) 'estimated', likely inaccurate, bed weight (95.0 kg) taken during rounds  Wt Readings from Last 1 Encounters:   03/27/24 1315 95 kg (209 lb 7 oz)   03/25/24 0340 89.8 kg (197 lb 15.6 oz)   03/22/24 1346 89.8 kg (198 lb)   03/21/24 1113 90 kg (198 lb 6.6 oz)   03/18/24 2202 90 kg (198 lb 6.6 oz)   03/17/24 2036 77.1 kg (170 lb)   Admit Weight: 77.1 kg (170 lb) (03/17/24 2036), Weight Method: Estimated    Enteral Nutrition Patient not receiving enteral nutrition at this time.    Parenteral Nutrition Patient not receiving parenteral nutrition support at this time.    Evaluation of Received Nutrient Intake    Calories: meeting estimated needs  Protein: meeting estimated needs    Patient Education Not applicable.    Nutrition Diagnosis     PES: Inadequate oral intake related to acute illness as evidenced by NPO since extubation. (resolved)     Nutrition Interventions     Intervention(s): general/healthful diet, commercial beverage, and collaboration with other providers    Goal: Meet greater than 80% of nutritional needs by follow-up. (goal met)  Goal: Tolerate enteral feeding at goal rate by follow-up. (goal discontinued)    Nutrition Goals & Monitoring     Dietitian will monitor: food and beverage intake and weight    Nutrition Risk/Follow-Up: low (follow-up in 5-7 days)   Please consult if re-assessment needed sooner.

## 2024-03-27 NOTE — PLAN OF CARE
Problem: Occupational Therapy  Goal: Occupational Therapy Goal  Description: Goals to be met by: 4/21/24     Patient will increase functional independence with ADLs by performing:    UE Dressing with Stand-by Assistance.  LE Dressing with Min A  Grooming while standing at sink with Contact Guard Assistance.  Toileting from toilet with Contact Guard Assistance for hygiene and clothing management.   Toilet transfer to toilet with Contact Guard Assistance.  Increased functional strength to 4/5 through therEx.    Outcome: Met

## 2024-03-27 NOTE — PROGRESS NOTES
[facility] Progress Note        Chief Complaint: Inpatient Follow-up     HPI:   68-year-old male with a history of nonischemic cardiomyopathy, systolic heart failure with an EF of 41%, VHD, chronic AFib on Xarelto, peripheral arterial disease, COPD, HTN, and a large left testicle hydrocele who presents to the ER complaining of what he thought was rectal bleeding as he was noting blood in the toilet and running down his legs.  Ultimately was found that the bleeding was coming from the posterior aspect of his scrotum from a superficial scrotal ulceration.  He also complained of persistent testicular swelling and pain.Doppler ultrasound was significantly limited, but could not rule out the presence of torsion.Urology was consulted and they made decision to proceed with emergent scrotal exploration.  Patient underwent surgery, seemed to tolerate well.  In the PACU, patient was persistently hypotensive with blood pressures staying approximately 80/50 despite fluid resuscitation. Upgraded to ICU for vasopressor support.Went into cardiac arrest with ventricular tachycardia requiring defibrillation x3 (03/18/2024).Remains on Antibiotics.Now off of pressors.Being downgraded to floors.FOBT pos. GI planned for EGD. [facility] planning for Ischemic evaluation. Consultants cleared for anticoagulants. Now  on FD lovenox.Underwent EGD, found to have grade B reflux esophagitis, chronic gastritis, chronic duodenitis.  May need outpatient colonoscopy, recommended Carafate for 7 days commenting dicyclomine or hyoscyamine q.6 p.r.n. for abdominal pain, Protonix 40 IV b.i.d. while inpatient, transition to p.o. for a month on discharge and then daily for 3 months.  Cardiology on board, awaiting ischemic evaluation.  Urology continues to follow.  PT recommending placement      Interval Hx:  It appears patient went for angiography yesterday.  Official report he was not yet at this time.  He was  transferred to a telemetry floor postprocedure.  Denies any current chest pain or shortness a breath.  Afebrile overnight.  Vital signs stable.     Objective/physical exam:  General: In no acute distress, afebrile  Chest:  Increased coarseness bilaterally.  No wheezing  Heart: RRR, +S1, S2, no appreciable murmur  Abdomen: Soft, nontender, BS +  MSK: Warm, no lower extremity edema, no clubbing or cyanosis  Neurologic: Alert and oriented x4, Cranial nerve II-XII intact, Strength 5/5 in all 4 extremities    VITAL SIGNS: 24 HRS MIN & MAX LAST   Temp  Min: 97.3 °F (36.3 °C)  Max: 98.8 °F (37.1 °C) 98 °F (36.7 °C)   BP  Min: 91/60  Max: 128/79 120/75   Pulse  Min: 64  Max: 96  67   Resp  Min: 17  Max: 20 18   SpO2  Min: 90 %  Max: 95 % 95 %       Recent Labs   Lab 03/25/24  0244 03/26/24 0449 03/27/24 0422   WBC 7.87 7.64 7.81   RBC 2.78* 2.82* 2.78*   HGB 8.7* 8.5* 8.5*   HCT 26.2* 26.8* 26.6*   MCV 94.2* 95.0* 95.7*   MCH 31.3* 30.1 30.6   MCHC 33.2 31.7* 32.0*   RDW 17.2* 16.8 16.9   * 381 397   MPV 9.8 9.6 9.6       Recent Labs   Lab 03/20/24  1939 03/21/24 0219 03/25/24  0241 03/26/24 0449 03/27/24 0422   NA  --    < > 145 142 140   K  --    < > 4.2 4.0 3.7   CO2  --    < > 26 26 24   BUN  --    < > 16.5 12.4 9.7   CREATININE  --    < > 1.29* 1.14 1.05   CALCIUM  --    < > 8.3* 8.1* 8.7*   PH 7.500*  --   --   --   --    MG  --    < > 2.10 2.00 2.00   ALBUMIN  --    < > 2.4* 2.4* 2.4*   ALKPHOS  --    < > 67 67 68   ALT  --    < > 9 10 9   AST  --    < > 19 18 18   BILITOT  --    < > 0.8 0.9 0.8    < > = values in this interval not displayed.          Microbiology Results (last 7 days)       Procedure Component Value Units Date/Time    Blood Culture [[id number]]  (Normal) Collected: 03/18/24 2226    Order Status: Completed Specimen: Blood, Venous Updated: 03/23/24 2300     CULTURE, BLOOD ([facility]) No Growth at 5 days    Blood culture #2 **CANNOT BE ORDERED STAT** [[id number]]  (Normal) Collected: 03/17/24  2103    Order Status: Completed Specimen: Blood Updated: 03/22/24 2200     CULTURE, BLOOD ([facility]) No Growth at 5 days    Blood culture #1 **CANNOT BE ORDERED STAT** [[id number]]  (Normal) Collected: 03/17/24 2103    Order Status: Completed Specimen: Blood Updated: 03/22/24 2200     CULTURE, BLOOD ([facility]) No Growth at 5 days    Respiratory Culture [[id number]]  (Abnormal) Collected: 03/20/24 0940    Order Status: Completed Specimen: Sputum, Expectorated Updated: 03/22/24 0912     Respiratory Culture Moderate Yeast     Comment: with no normal respiratory jt        GRAM STAIN Quality 2+      No bacteria seen    Wound Culture [[id number]]  (Abnormal)  (Susceptibility) Collected: 03/18/24 1502    Order Status: Completed Specimen: Abscess from Scrotum Updated: 03/21/24 1306     Wound Culture Many Escherichia coli      Many Streptococcus agalactiae (Group B)    Anaerobic Culture [[id number]] Collected: 03/18/24 1502    Order Status: Completed Specimen: Abscess from Scrotum Updated: 03/21/24 0941     Anaerobe Culture No Anaerobes Isolated             Radiology:  Cardiac catheterization  Narrative: The procedure log was documented by No documenter listed and verified by   Adriano Montiel MD.    Procedure:   Selective coronary angiography  Moderate (Conscious) Sedation     Indication: VT arrest, known cardiomyopathy, NSTEMI  Consent: The patient was brought to the cardiac catheterization lab. Was   instructed and explained about the risk, benefit and alternatives of the   procedure included but not limited to sudden cardiac death, myocardial   infarction, bleeding, vascular injury, renal failure, stroke, contrast   allergy, risk of conscious sedation and need for emergent bypass surgery.    the patient was agreeable to proceed.  Signed the consent form.  Access:  The patient was prepped using the usual sterile fashion.  Right radial artery  was accessed with micropuncture technique, ultrasound   guidance.  An image of the  ultrasound was put in the paper chart for   purpose of documentation.  Sheath size:6F     Kirill test:  Verified with pulse oximetry deemed to be adequate for radial   artery procedure.    Diagnostic catheters : TIG, JL 3.5    Coronary findings:    Dominance: right   Left main:  10-20% calcified distal left main disease  Left anterior descending artery:  50% calcified proximal stenosis  Circumflex artery:  Luminal irregularities  Right coronary artery:  Luminal irregularities    Access Closure:    At the end of the procedure the sheath was removed. A TR band applied to   the right radial artery . Excellent hemostasis achieved.  Impression: Nonobstructive coronary artery disease.    Plan:  Medical management.    Date: 3/26/2024  Time: 7:28 PM      Scheduled Med:   albuterol-ipratropium  3 mL Nebulization Once    albuterol-ipratropium  3 mL Nebulization Q4H    amoxicillin-clavulanate 875-125mg  1 tablet Oral Q12H    atorvastatin  80 mg Oral Daily    chlorhexidine  15 mL Mouth/Throat BID    enoxparin  1 mg/kg Subcutaneous Q12H (prophylaxis, 0900/2100)    folic acid  1 mg Oral Daily    midodrine  10 mg Oral TID WM    multivitamin  1 tablet Oral Daily    pantoprazole  40 mg Intravenous BID    sucralfate  1 g Oral QID (AC & HS)    thiamine (B-1) 500 mg in dextrose 5 % (D5W) 100 mL IVPB  500 mg Intravenous TID        Continuous Infusions:       PRN Meds:  0.9%  NaCl infusion (for blood administration), 0.9%  NaCl infusion (for blood administration), acetaminophen, dextrose 10 % in water (D10W), dextrose 10 % in water (D10W), dextrose 10%, dextrose 10%, hydrALAZINE, insulin aspart U-100, ipratropium, LORazepam, melatonin, morphine, ondansetron, oxyCODONE, sodium chloride 0.9%, sodium chloride 0.9%       Assessment/Plan:   Scrotal abscess s/p I&D with left joselito orchiectomy 03/18/2024  Abnormal Chest Imaging with Rt lower lobe Infiltrate, concern Post Viral Pneumonia.-improving  Human Metapneumovirus +ve  Acute hypoxic  respiratory failure from above  Sepsis from above requiring vasopressor supoort  Recent cardiac arrest with ventricular tachycardia requiring defibrillation x3 (03/18/2024)  AFib RVR   NSTMI, unspecified  Partial bowel obstruction  Normocytic anemia.    FOBT positive.Grade B reflux esophagitis, chronic gastritis, chronic duodenitis.    Electrolyte derangements   Abnormal CTA-abdominal aortic aneurysmal dilatation  with mural thrombus  ELIZABETH    Follow up Cardiology report from angiogram yesterday and recommendations postprocedure.    He was not currently have any scheduled antiplatelet therapy.    He was on a statin.    Continue with oral Augmentin for E coli and group B strep which he was growing from scrotal wound.  Five more days per Urology recommendations and can follow up in the clinic.  He did required a bump in his supplemental O2 after the procedure.  Now on 7 L OxyMask.  Oxygen saturation about 94%.  Will get a repeat chest x-ray this morning see if we built up some fluid after his procedure  Labs this morning show stable hemoglobin.  Renal function also stable as well as electrolytes.  No wheezing at the time of my exam.  No role for steroids.  Will follow up x-ray in monitor clinical course     Critical care diagnosis: acute hypoxemic respiratory failure requiring high-flow oxygen  Critical care interventions: hands on evaluation, review of labs/radiographs/records and discussions with family  Critical care time spent: >32 minutes        Tha Edmond MD   03/27/2024     All diagnosis and differential diagnosis have been reviewed; assessment and plan has been documented; I have personally reviewed the labs and test results that are presently available; I have reviewed the patients medication list; I have reviewed the consulting providers response and recommendations. I have reviewed or attempted to review medical records based upon their availability    All of the patient's questions have been  addressed  and answered. Patient's is agreeable to the above stated plan. I will continue to monitor closely and make adjustments to medical management as needed.  _____________________________________________________________________

## 2024-03-27 NOTE — PT/OT/SLP DISCHARGE
Occupational Therapy Discharge Summary    Ran Reyes Jr.  MRN: [id number]   Principal Problem: Scrotal hematoma      Patient Discharged from acute Occupational Therapy on 3/27/24  Please refer to prior OT note dated 3/26/24 for functional status.    Assessment:   Per ANGIE, pt has satisfactorily met goals for OT.  OT met with patient who reports he is confident in his ability to perform ADLs although he hopes he will have HH for wound care.  OT signing off, please re-consult if needs arise.  Recliner ordered for room.  Geomat in room.  Rec call for assist with lines, pt in agreement.    Objective:     GOALS:   Multidisciplinary Problems       Occupational Therapy Goals          Problem: Occupational Therapy    Goal Priority Disciplines Outcome Interventions   Occupational Therapy Goal     OT, PT/OT Ongoing, Progressing    Description: Goals to be met by: 4/21/24     Patient will increase functional independence with ADLs by performing:    UE Dressing with Stand-by Assistance.  LE Dressing with Min A  Grooming while standing at sink with Contact Guard Assistance.  Toileting from toilet with Contact Guard Assistance for hygiene and clothing management.   Toilet transfer to toilet with Contact Guard Assistance.  Increased functional strength to 4/5 through therEx.                         Reasons for Discontinuation of Therapy Services  Satisfactory goal achievement.      Plan:     Patient Discharged to: Home with Home Health Service    3/27/2024

## 2024-03-27 NOTE — PLAN OF CARE
Problem: Adult Inpatient Plan of Care  Goal: Plan of Care Review  Outcome: Ongoing, Progressing  Goal: Absence of Hospital-Acquired Illness or Injury  Outcome: Ongoing, Progressing  Goal: Optimal Comfort and Wellbeing  Outcome: Ongoing, Progressing  Goal: Readiness for Transition of Care  Outcome: Ongoing, Progressing     Problem: Infection  Goal: Absence of Infection Signs and Symptoms  Outcome: Ongoing, Progressing     Problem: Fall Injury Risk  Goal: Absence of Fall and Fall-Related Injury  Outcome: Ongoing, Progressing     Problem: Skin Injury Risk Increased  Goal: Skin Health and Integrity  Outcome: Ongoing, Progressing

## 2024-03-28 LAB
ABS NEUT (OLG): 6.13 X10(3)/MCL (ref 2.1–9.2)
ALBUMIN SERPL-MCNC: 2.4 G/DL (ref 3.4–4.8)
ALBUMIN/GLOB SERPL: 0.7 RATIO (ref 1.1–2)
ALLENS TEST BLOOD GAS (OHS): YES
ALP SERPL-CCNC: 65 UNIT/L (ref 40–150)
ALT SERPL-CCNC: 7 UNIT/L (ref 0–55)
ANISOCYTOSIS BLD QL SMEAR: ABNORMAL
AST SERPL-CCNC: 15 UNIT/L (ref 5–34)
BASE EXCESS BLD CALC-SCNC: 1.3 MMOL/L (ref -2–2)
BILIRUB SERPL-MCNC: 0.6 MG/DL
BLOOD GAS SAMPLE TYPE (OHS): ABNORMAL
BSA FOR ECHO PROCEDURE: 2.12 M2
BUN SERPL-MCNC: 10.1 MG/DL (ref 8.4–25.7)
CA-I BLD-SCNC: 1.16 MMOL/L (ref 1.12–1.23)
CALCIUM SERPL-MCNC: 8.3 MG/DL (ref 8.8–10)
CHLORIDE SERPL-SCNC: 107 MMOL/L (ref 98–107)
CO2 BLDA-SCNC: 27.1 MMOL/L
CO2 SERPL-SCNC: 26 MMOL/L (ref 23–31)
COHGB MFR BLDA: 2 % (ref 0.5–1.5)
CREAT SERPL-MCNC: 0.93 MG/DL (ref 0.73–1.18)
CV ECHO LV RWT: 0.59 CM
DRAWN BY BLOOD GAS (OHS): ABNORMAL
ECHO LV POSTERIOR WALL: 1.58 CM (ref 0.6–1.1)
ERYTHROCYTE [DISTWIDTH] IN BLOOD BY AUTOMATED COUNT: 17.2 % (ref 11.5–17)
FRACTIONAL SHORTENING: 16 % (ref 28–44)
GFR SERPLBLD CREATININE-BSD FMLA CKD-EPI: >60 MLS/MIN/1.73/M2
GLOBULIN SER-MCNC: 3.5 GM/DL (ref 2.4–3.5)
GLUCOSE SERPL-MCNC: 87 MG/DL (ref 82–115)
HCO3 BLDA-SCNC: 25.9 MMOL/L (ref 22–26)
HCT VFR BLD AUTO: 26.9 % (ref 42–52)
HGB BLD-MCNC: 8.4 G/DL (ref 14–18)
INSTRUMENT WBC (OLG): 8.06 X10(3)/MCL
INTERVENTRICULAR SEPTUM: 1.81 CM (ref 0.6–1.1)
LEFT INTERNAL DIMENSION IN SYSTOLE: 4.47 CM (ref 2.1–4)
LEFT VENTRICLE DIASTOLIC VOLUME INDEX: 64.19 ML/M2
LEFT VENTRICLE DIASTOLIC VOLUME: 138 ML
LEFT VENTRICLE MASS INDEX: 199 G/M2
LEFT VENTRICLE SYSTOLIC VOLUME INDEX: 42.3 ML/M2
LEFT VENTRICLE SYSTOLIC VOLUME: 91 ML
LEFT VENTRICULAR INTERNAL DIMENSION IN DIASTOLE: 5.35 CM (ref 3.5–6)
LEFT VENTRICULAR MASS: 428.66 G
LPM (OHS): 2
LYMPHOCYTES NFR BLD MANUAL: 1.37 X10(3)/MCL
LYMPHOCYTES NFR BLD MANUAL: 17 %
MAGNESIUM SERPL-MCNC: 2 MG/DL (ref 1.6–2.6)
MCH RBC QN AUTO: 31 PG (ref 27–31)
MCHC RBC AUTO-ENTMCNC: 31.2 G/DL (ref 33–36)
MCV RBC AUTO: 99.3 FL (ref 80–94)
METHGB MFR BLDA: 0.8 % (ref 0.4–1.5)
MONOCYTES NFR BLD MANUAL: 0.56 X10(3)/MCL (ref 0.1–1.3)
MONOCYTES NFR BLD MANUAL: 7 %
MR PISA EROA: 0.91 CM2
NEUTROPHILS NFR BLD MANUAL: 76 %
NRBC BLD AUTO-RTO: 0 %
NRBC BLD MANUAL-RTO: 1 %
O2 HB BLOOD GAS (OHS): 84 % (ref 94–97)
OHS LV EJECTION FRACTION SIMPSONS BIPLANE MOD: 46 %
OXYGEN DEVICE BLOOD GAS (OHS): ABNORMAL
OXYHGB MFR BLDA: 9.4 G/DL (ref 12–16)
PCO2 BLDA: 40 MMHG (ref 35–45)
PH BLDA: 7.42 [PH] (ref 7.35–7.45)
PHOSPHATE SERPL-MCNC: 3.1 MG/DL (ref 2.3–4.7)
PISA MRMAX VEL: 3.48 M/S
PISA RADIUS: 1.2 CM
PISA VN NYQUIST MS: 0.35 M/S
PISA VN NYQUIST: 0.35 M/S
PLATELET # BLD AUTO: 380 X10(3)/MCL (ref 130–400)
PLATELET # BLD EST: NORMAL 10*3/UL
PMV BLD AUTO: 10.1 FL (ref 7.4–10.4)
PO2 BLDA: 51 MMHG (ref 80–100)
POIKILOCYTOSIS BLD QL SMEAR: ABNORMAL
POTASSIUM BLOOD GAS (OHS): 4.5 MMOL/L (ref 3.5–5)
POTASSIUM SERPL-SCNC: 4.2 MMOL/L (ref 3.5–5.1)
PROT SERPL-MCNC: 5.9 GM/DL (ref 5.8–7.6)
RBC # BLD AUTO: 2.71 X10(6)/MCL (ref 4.7–6.1)
RBC MORPH BLD: ABNORMAL
RIGHT VENTRICULAR END-DIASTOLIC DIMENSION: 3.07 CM
SAMPLE SITE BLOOD GAS (OHS): ABNORMAL
SAO2 % BLDA: 86.4 %
SODIUM BLOOD GAS (OHS): 136 MMOL/L (ref 137–145)
SODIUM SERPL-SCNC: 141 MMOL/L (ref 136–145)
TRIGL SERPL-MCNC: 57 MG/DL (ref 34–140)
WBC # SPEC AUTO: 8.06 X10(3)/MCL (ref 4.5–11.5)
Z-SCORE OF LEFT VENTRICULAR DIMENSION IN END DIASTOLE: -2.66
Z-SCORE OF LEFT VENTRICULAR DIMENSION IN END SYSTOLE: 0.4

## 2024-03-28 PROCEDURE — 25000003 PHARM REV CODE 250

## 2024-03-28 PROCEDURE — 94640 AIRWAY INHALATION TREATMENT: CPT

## 2024-03-28 PROCEDURE — 99900031 HC PATIENT EDUCATION (STAT)

## 2024-03-28 PROCEDURE — 83735 ASSAY OF MAGNESIUM: CPT

## 2024-03-28 PROCEDURE — 21400001 HC TELEMETRY ROOM

## 2024-03-28 PROCEDURE — 85027 COMPLETE CBC AUTOMATED: CPT

## 2024-03-28 PROCEDURE — 99900035 HC TECH TIME PER 15 MIN (STAT)

## 2024-03-28 PROCEDURE — 25000003 PHARM REV CODE 250: Performed by: STUDENT IN AN ORGANIZED HEALTH CARE EDUCATION/TRAINING PROGRAM

## 2024-03-28 PROCEDURE — 84478 ASSAY OF TRIGLYCERIDES: CPT

## 2024-03-28 PROCEDURE — 80053 COMPREHEN METABOLIC PANEL: CPT

## 2024-03-28 PROCEDURE — 94760 N-INVAS EAR/PLS OXIMETRY 1: CPT | Mod: XB

## 2024-03-28 PROCEDURE — 82803 BLOOD GASES ANY COMBINATION: CPT

## 2024-03-28 PROCEDURE — 25500020 PHARM REV CODE 255: Performed by: INTERNAL MEDICINE

## 2024-03-28 PROCEDURE — 94761 N-INVAS EAR/PLS OXIMETRY MLT: CPT

## 2024-03-28 PROCEDURE — 25000003 PHARM REV CODE 250: Performed by: INTERNAL MEDICINE

## 2024-03-28 PROCEDURE — 84100 ASSAY OF PHOSPHORUS: CPT

## 2024-03-28 PROCEDURE — 25000242 PHARM REV CODE 250 ALT 637 W/ HCPCS: Performed by: INTERNAL MEDICINE

## 2024-03-28 PROCEDURE — 36600 WITHDRAWAL OF ARTERIAL BLOOD: CPT

## 2024-03-28 PROCEDURE — 63600175 PHARM REV CODE 636 W HCPCS

## 2024-03-28 PROCEDURE — C9113 INJ PANTOPRAZOLE SODIUM, VIA: HCPCS | Performed by: INTERNAL MEDICINE

## 2024-03-28 PROCEDURE — 63600175 PHARM REV CODE 636 W HCPCS: Performed by: INTERNAL MEDICINE

## 2024-03-28 PROCEDURE — 27000221 HC OXYGEN, UP TO 24 HOURS

## 2024-03-28 RX ORDER — PHENAZOPYRIDINE HYDROCHLORIDE 100 MG/1
100 TABLET, FILM COATED ORAL
Status: DISCONTINUED | OUTPATIENT
Start: 2024-03-28 | End: 2024-04-12 | Stop reason: HOSPADM

## 2024-03-28 RX ORDER — ASPIRIN 81 MG/1
81 TABLET ORAL DAILY
Status: DISCONTINUED | OUTPATIENT
Start: 2024-03-28 | End: 2024-04-12 | Stop reason: HOSPADM

## 2024-03-28 RX ADMIN — CHLORHEXIDINE GLUCONATE 0.12% ORAL RINSE 15 ML: 1.2 LIQUID ORAL at 09:03

## 2024-03-28 RX ADMIN — PHENAZOPYRIDINE HYDROCHLORIDE 100 MG: 100 TABLET ORAL at 04:03

## 2024-03-28 RX ADMIN — SUCRALFATE 1 G: 1 TABLET ORAL at 04:03

## 2024-03-28 RX ADMIN — THIAMINE HYDROCHLORIDE 500 MG: 100 INJECTION, SOLUTION INTRAMUSCULAR; INTRAVENOUS at 08:03

## 2024-03-28 RX ADMIN — PANTOPRAZOLE SODIUM 40 MG: 40 INJECTION, POWDER, FOR SOLUTION INTRAVENOUS at 09:03

## 2024-03-28 RX ADMIN — Medication 6 MG: at 09:03

## 2024-03-28 RX ADMIN — OXYCODONE HYDROCHLORIDE 5 MG: 5 TABLET ORAL at 09:03

## 2024-03-28 RX ADMIN — THERA TABS 1 TABLET: TAB at 08:03

## 2024-03-28 RX ADMIN — IPRATROPIUM BROMIDE AND ALBUTEROL SULFATE 3 ML: 2.5; .5 SOLUTION RESPIRATORY (INHALATION) at 04:03

## 2024-03-28 RX ADMIN — CHLORHEXIDINE GLUCONATE 0.12% ORAL RINSE 15 ML: 1.2 LIQUID ORAL at 08:03

## 2024-03-28 RX ADMIN — OXYCODONE HYDROCHLORIDE 5 MG: 5 TABLET ORAL at 05:03

## 2024-03-28 RX ADMIN — MIDODRINE HYDROCHLORIDE 10 MG: 5 TABLET ORAL at 10:03

## 2024-03-28 RX ADMIN — IPRATROPIUM BROMIDE AND ALBUTEROL SULFATE 3 ML: 2.5; .5 SOLUTION RESPIRATORY (INHALATION) at 12:03

## 2024-03-28 RX ADMIN — ENOXAPARIN SODIUM 90 MG: 100 INJECTION SUBCUTANEOUS at 09:03

## 2024-03-28 RX ADMIN — FOLIC ACID 1 MG: 1 TABLET ORAL at 08:03

## 2024-03-28 RX ADMIN — IPRATROPIUM BROMIDE AND ALBUTEROL SULFATE 3 ML: 2.5; .5 SOLUTION RESPIRATORY (INHALATION) at 07:03

## 2024-03-28 RX ADMIN — SUCRALFATE 1 G: 1 TABLET ORAL at 10:03

## 2024-03-28 RX ADMIN — IPRATROPIUM BROMIDE AND ALBUTEROL SULFATE 3 ML: 2.5; .5 SOLUTION RESPIRATORY (INHALATION) at 09:03

## 2024-03-28 RX ADMIN — MORPHINE SULFATE 2 MG: 4 INJECTION, SOLUTION INTRAMUSCULAR; INTRAVENOUS at 10:03

## 2024-03-28 RX ADMIN — LORAZEPAM 2 MG: 1 TABLET ORAL at 06:03

## 2024-03-28 RX ADMIN — ATORVASTATIN CALCIUM 80 MG: 40 TABLET, FILM COATED ORAL at 08:03

## 2024-03-28 RX ADMIN — AMIODARONE HYDROCHLORIDE 400 MG: 200 TABLET ORAL at 08:03

## 2024-03-28 RX ADMIN — PANTOPRAZOLE SODIUM 40 MG: 40 INJECTION, POWDER, FOR SOLUTION INTRAVENOUS at 08:03

## 2024-03-28 RX ADMIN — AMOXICILLIN AND CLAVULANATE POTASSIUM 1 TABLET: 875; 125 TABLET, FILM COATED ORAL at 08:03

## 2024-03-28 RX ADMIN — PERFLUTREN 3 ML: 6.52 INJECTION, SUSPENSION INTRAVENOUS at 11:03

## 2024-03-28 RX ADMIN — ENOXAPARIN SODIUM 90 MG: 100 INJECTION SUBCUTANEOUS at 08:03

## 2024-03-28 RX ADMIN — SUCRALFATE 1 G: 1 TABLET ORAL at 05:03

## 2024-03-28 RX ADMIN — MIDODRINE HYDROCHLORIDE 10 MG: 5 TABLET ORAL at 04:03

## 2024-03-28 RX ADMIN — AMIODARONE HYDROCHLORIDE 400 MG: 200 TABLET ORAL at 09:03

## 2024-03-28 RX ADMIN — SUCRALFATE 1 G: 1 TABLET ORAL at 09:03

## 2024-03-28 RX ADMIN — OXYCODONE HYDROCHLORIDE 5 MG: 5 TABLET ORAL at 02:03

## 2024-03-28 RX ADMIN — LORAZEPAM 2 MG: 1 TABLET ORAL at 10:03

## 2024-03-28 RX ADMIN — MIDODRINE HYDROCHLORIDE 10 MG: 5 TABLET ORAL at 08:03

## 2024-03-28 RX ADMIN — MORPHINE SULFATE 2 MG: 4 INJECTION, SOLUTION INTRAMUSCULAR; INTRAVENOUS at 04:03

## 2024-03-28 RX ADMIN — AMOXICILLIN AND CLAVULANATE POTASSIUM 1 TABLET: 875; 125 TABLET, FILM COATED ORAL at 09:03

## 2024-03-28 NOTE — PROGRESS NOTES
Hospital Medicine  Progress Note    Patient Name: Ran Reyes Jr.  MRN: [id number]  Status: IP- Inpatient   Admission Date: 3/17/2024  Length of Stay: 10  Date of Service: 03/28/2024       CC: hospital follow-up for scrotal infection       SUBJECTIVE   HPI and hospital course reviewed.  Today: Patient seen and examined at bedside, and chart reviewed.  LHC noted non-obstructive CAD, medical management recommended.  Patient improving, oxygen requirements down to 3L today. Hgb 8.4 today (8.5 yesterday).  Reports improved SOB.  Still with a lot of groin pain, still requiring IV narcotics to tolerate dressing changes.      MEDICATIONS   Scheduled   albuterol-ipratropium  3 mL Nebulization Once    albuterol-ipratropium  3 mL Nebulization Q4H    [START ON 3/30/2024] amiodarone  200 mg Oral BID    [START ON 4/3/2024] amiodarone  200 mg Oral Daily    amiodarone  400 mg Oral BID    amoxicillin-clavulanate 875-125mg  1 tablet Oral Q12H    atorvastatin  80 mg Oral Daily    chlorhexidine  15 mL Mouth/Throat BID    enoxparin  1 mg/kg Subcutaneous Q12H (prophylaxis, 0900/2100)    folic acid  1 mg Oral Daily    midodrine  10 mg Oral TID WM    multivitamin  1 tablet Oral Daily    pantoprazole  40 mg Intravenous BID    sucralfate  1 g Oral QID (AC & HS)     Continuous Infusions  None      PHYSICAL EXAM   VITALS: T 98.1 °F (36.7 °C)   /87   P 71   RR 18   O2 (!) 91 %    GENERAL: Awake and in NAD  LUNGS: fair air movement B/L, no wheezing  CVS: Normal rate  GI/: Soft, NT, bowel sounds positive.  EXTREMITIES: 1+ LE edema  NEURO: AAOx3  PSYCH: Cooperative      LABS   CBC  Recent Labs     03/27/24  0422 03/28/24  0352   WBC 7.81 8.06  8.06   RBC 2.78* 2.71*   HGB 8.5* 8.4*   HCT 26.6* 26.9*   MCV 95.7* 99.3*   MCH 30.6 31.0   MCHC 32.0* 31.2*   RDW 16.9 17.2*    380     CHEM  Recent Labs     03/27/24  0422 03/28/24  0352    141   K 3.7 4.2   CHLORIDE 106 107   CO2 24 26   BUN 9.7 10.1   CREATININE 1.05 0.93    GLUCOSE 92 87   CALCIUM 8.7* 8.3*   MG 2.00 2.00   PHOS 2.9 3.1   ALBUMIN 2.4* 2.4*   GLOBULIN 4.1* 3.5   ALKPHOS 68 65   ALT 9 7   AST 18 15   BILITOT 0.8 0.6         MICROBIOLOGY     Microbiology Results (last 7 days)       Procedure Component Value Units Date/Time    Blood Culture [[id number]]  (Normal) Collected: 03/18/24 2226    Order Status: Completed Specimen: Blood, Venous Updated: 03/23/24 2300     CULTURE, BLOOD ([facility]) No Growth at 5 days    Blood culture #2 **CANNOT BE ORDERED STAT** [[id number]]  (Normal) Collected: 03/17/24 2103    Order Status: Completed Specimen: Blood Updated: 03/22/24 2200     CULTURE, BLOOD ([facility]) No Growth at 5 days    Blood culture #1 **CANNOT BE ORDERED STAT** [[id number]]  (Normal) Collected: 03/17/24 2103    Order Status: Completed Specimen: Blood Updated: 03/22/24 2200     CULTURE, BLOOD ([facility]) No Growth at 5 days    Respiratory Culture [[id number]]  (Abnormal) Collected: 03/20/24 0940    Order Status: Completed Specimen: Sputum, Expectorated Updated: 03/22/24 0912     Respiratory Culture Moderate Yeast     Comment: with no normal respiratory jt        GRAM STAIN Quality 2+      No bacteria seen    Wound Culture [[id number]]  (Abnormal)  (Susceptibility) Collected: 03/18/24 1502    Order Status: Completed Specimen: Abscess from Scrotum Updated: 03/21/24 1306     Wound Culture Many Escherichia coli      Many Streptococcus agalactiae (Group B)              DIAGNOSTICS   X-Ray Chest 1 View  Narrative:  Examination reveals cardiomediastinal silhouette and pleuroparenchymal changes to be essentially unchanged as compared with the previous exam  Impression:   No significant change  Electronically signed by: Lito Hurst  Date:    03/27/2024  Time:    08:01      ASSESSMENT   Scrotal abscess, with component of early Claudine's, s/p exploration, I&D, and left orchiectomy 03/18.  Severe sepsis from above, resolving  Questionable RLL post-viral Pneumonia, Human Metapneumovirus  positive, resolving  Acute hypoxic respiratory failure from above  Cardiac arrest with ventricular tachycardia requiring defibrillation x3 (03/18/2024)  AFib RVR  NSTMI, unspecified  Partial bowel obstruction  Normocytic anemia.    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

## 2024-03-28 NOTE — NURSING
[facility] 4th Floor Medical Telemetry  Wound Care    Patient Name:  Ran Reyes Jr.   MRN:  [id number]  Date: 3/28/2024  Diagnosis: Scrotal hematoma    History:     Past Medical History:   Diagnosis Date    A-fib     Anticoagulant long-term use     Aortic aneurysm     Cataract     CHF (congestive heart failure)     Chronic atrial fibrillation     COPD (chronic obstructive pulmonary disease)     Coronary artery disease     HLD (hyperlipidemia)     Hypertension     Mitral regurgitation     PAD (peripheral artery disease)     Primary open angle glaucoma (POAG)        Social History     Socioeconomic History    Marital status: Single   Tobacco Use    Smoking status: Every Day     Current packs/day: 0.25     Average packs/day: 0.3 packs/day for 40.0 years (10.0 ttl pk-yrs)     Types: Cigarettes     Passive exposure: Current    Smokeless tobacco: Never   Substance and Sexual Activity    Alcohol use: Yes     Alcohol/week: 3.0 standard drinks of alcohol     Types: 3 Cans of beer per week     Comment: socially    Drug use: Yes     Frequency: 1.0 times per week     Types: Marijuana     Comment: no use in over 2 months     Social Determinants of Health     Financial Resource Strain: Patient Unable To Answer (3/19/2024)    Overall Financial Resource Strain (CARDIA)     Difficulty of Paying Living Expenses: Patient unable to answer   Food Insecurity: Patient Unable To Answer (3/19/2024)    Hunger Vital Sign     Worried About Running Out of Food in the Last Year: Patient unable to answer     Ran Out of Food in the Last Year: Patient unable to answer   Transportation Needs: Patient Unable To Answer (3/19/2024)    PRAPARE - Transportation     Lack of Transportation (Medical): Patient unable to answer     Lack of Transportation (Non-Medical): Patient unable to answer   Physical Activity: Inactive (11/30/2022)    Exercise Vital Sign     Days of Exercise per Week: 0 days     Minutes of Exercise per Session: 0 min    Stress: Patient Unable To Answer (3/19/2024)    [employer] [place] [employer] - Occupational Stress Questionnaire     Feeling of Stress : Patient unable to answer   Social Connections: Socially Isolated (11/30/2022)    Social Connection and Isolation Panel [NHANES]     Frequency of Communication with Friends and Family: Never     Frequency of Social Gatherings with Friends and Family: Never     Attends Holiness Services: More than 4 times per year     Active Member of Clubs or Organizations: No     Attends Club or Organization Meetings: Never     Marital Status: Never    Housing Stability: Patient Unable To Answer (3/19/2024)    Housing Stability Vital Sign     Unable to Pay for Housing in the Last Year: Patient unable to answer     Unstable Housing in the Last Year: Patient unable to answer       Precautions:     Allergies as of 03/17/2024    (No Known Allergies)       [facility] Assessment Details/Treatment      03/28/24 1103   Pain Reassessment   Pain Rating Post Med Admin 2        Incision/Site 03/18/24 1649 Left Scrotum lateral vertical   Date First Assessed/Time First Assessed: 03/18/24 1649   Side: Left  Location: Scrotum  Orientation: lateral  Incision Type: vertical  Closure Method: Other (see comments)  Additional Comments: left open - packed with dakins soaked kerlix, fluffs, ABD...   Wound Image    Dressing Appearance Intact;Moist drainage   Drainage Amount Small   Drainage Characteristics/Odor Serous;No odor   Appearance Pink;Red;Moist  (small area about 1x1 dk tissue along an edge-see photo)   Red (%), Wound Tissue Color 100 %   Periwound Area Dry   Wound Edges Irregular   Wound Length (cm) 10 cm   Wound Width (cm) 6 cm   Wound Depth (cm) 1.5 cm   Wound Volume (cm^3) 90 cm^3   Wound Surface Area (cm^2) 60 cm^2   Care Cleansed with:;Antimicrobial agent   Dressing Gauze, wet to dry  (with dakins sol)     Re-eval of left scrotal ulcer-see new photo.  Pt premedicated with MS iv before care.     cLeansed with dakins and redressed with dakins moist sponges.  Sm area of dk tissue see photo.  Nurse noted.   He tolerated well.  Less depth  to wd and tissue much more vascular.  Will check up on him weekly while in hospital.  .  Spoke with nurse Augustin.     03/28/2024

## 2024-03-28 NOTE — PROGRESS NOTES
"  [facility]    Cardiology  Progress Note    Patient Name: Ran Reyes Jr.  MRN: [id number]  Admission Date: 3/17/2024  Hospital Length of Stay: 10 days  Code Status: Full Code   Attending Physician: Camilo Morales MD   Primary Care Physician: Abiodun Recinos DO  Expected Discharge Date:   Principal Problem:Scrotal hematoma    Subjective:   Chief Complaint/Reason for Consult: OAC recs     HPI: Ran Reyes Jr. Is a 68 year old male, known to Dr. Wagner, with PMH of  cardiomyopathy, systolic heart failure with an EF of 41%, VHD, chronic AFib on Xarelto, peripheral arterial disease, COPD, HTN, and a large left testicle hydrocele who presented to the ED 3/18/24 for scrotal bleeding with testicular swelling and pain. Found to have sepsis likely due to UTI, acute bronchitis. Also found in ED to be in Afib RVR on EKG, bp 90s/60s. BNP 1273, troponin 0.045 --> 0.055. Patient admits to dyspnea, cough, and wheezing. Denies chest pains. Cardiology being consulted for OAC recommendations.    Hospital Course:  3.19.24: Intubated/Mechanical Ventilation. AF SVR. On Vasopressor Support. Family at bedside.   3.20.24: NAD. Vented/Sedated. PAF/CVR. Dobutamine 2.5mcg/kg/min. Amiodarone 0.5mg/min.   3.21.24: NAD. Extubated. "I am ok." PAF/CVR. Levophed 0.01mcg/kg/min. Oral Amiodarone.   3.22.24: NAD. "I am feeling better." PAF/SVR but Mostly CVR. Off Pressors.   3.23.24: NAD. "I am good." PAF/SVR - Mostly CVR. No Profound Bradycardia since Yesterday. Remains off Pressors.   3.24.24: NAD. "I am fine." PAF/SVR, Mostly CVR in 60s, PVCs. H&H 7.3/23.3 - Transfusion of PRBCs.  3.25.24: NAD. "I am ok." PAF/SVR. Mostly CVR 60s-70s. H&H 8.7/26.2; GI Clear for DAPT/AC  3.26.24: NAD. Sitting in Bedside Chair. "I am feeling pretty good." H&H 8.5/26.8 s/p 1 Unit PRBCS on 3.25.24. Simple Face Mask. Denies CP, SOB and Palps.   3.27.24: NAD. Denies CP, SOB, or palps. Right wrist benign. "I feel good." H&H stable. On 5 L face mask. " "  3.28.24: NAD. Denies Cp, SOB, or palps. SR on tele. BP stable. On 3 L NC. "I am okay."      PMH: Cardiomyopathy, systolic heart failure with an EF of 41%, VHD, chronic AFib on Xarelto, peripheral arterial disease, COPD, HTN, CAD (Details Unclear)  PSH: cardiac stent >10 years ago, L FA atherectomy and angioplasty, R SFA stent, cataract extraction,   Family History: Father MI at age 66.   Social History: 10+ pack year hx current smoker, social EtOH, denies drugs.     Previous Cardiac Diagnostics:   [facility] (3.26.24):  Coronary findings:  Dominance: right   Left main:  10-20% calcified distal left main disease  Left anterior descending artery:  50% calcified proximal stenosis  Circumflex artery:  Luminal irregularities  Right coronary artery:  Luminal irregularities    Echocardiogram (3.19.24):  Left Ventricle: The left ventricle is normal in size. Increased wall thickness. Unable to assess wall motion. There is moderately reduced systolic function with a visually estimated ejection fraction of 30 - 40%.  Right Ventricle: Mild right ventricular enlargement.  Left Atrium: Left atrium is moderately dilated.  Right Atrium: Right atrium is severely dilated.  Mitral Valve: There is moderate regurgitation.  Tricuspid Valve: There is mild to moderate regurgitation.  Pulmonary Artery: Pulmonary artery pressure could not be estimated.  IVC/SVC: Patient is ventilated, cannot use IVC diameter to estimate right atrial pressure.    Echocardiogram (1.31.24):   Left Ventricle: The left ventricle is normal in size. Mildly increased wall thickness. There is reduced systolic function. Biplane (2D) method of discs ejection fraction is 41%. Unable to assess diastolic function due to atrial fibrillation.  Right Ventricle: Normal right ventricular cavity size. Systolic function is mildly reduced.  Left Atrium: Left atrium is severely dilated.  Right Atrium: Right atrium is severely dilated.  Aortic Valve: The aortic valve is a trileaflet " valve.  Mitral Valve: There is bileaflet sclerosis. There is moderate to severe regurgitation.  Tricuspid Valve: There is mild regurgitation.  IVC/SVC: Normal venous pressure at 3 mmHg.    Carotid US (2.7.23):  The study quality is average.   1-39% stenosis in the proximal right internal carotid artery based on Bluth Criteria. Antegrade right vertebral artery flow.   1-39% stenosis in the proximal left internal carotid artery based on Bluth Criteria. Antegrade left vertebral artery flow.     Echocardiogram (11.8.22):  The study quality is average.   The left ventricle is moderately enlarged. Left ventricular diastolic dimension is 6.4 cms. Global left ventricular systolic function is moderately decreased. The left ventricular ejection fraction is 40%. Arrhythmia noted throughout much of the exam.   There is no evidence of mitral stenosis or prolapse appreciated. MV Ortiz score ~ 5. The area by planimetry is 5.3 cm². The mean trans mitral gradient is 1.6 mmHg. Suspect borderline severe (4+) mitral regurgitation with a posteriorly directed jet. MR PISA radius ~ 0.93 cm, MR ERO ~ 0.35 cm^2, MR regurgitant volume ~ 72 ml/beat, MR regurgitant fraction ~ 55%, MR vena contracta ~ 0.54 cm.  Mild calcification of the aortic valve is noted with adequate cuspal excursion.   Trace pulmonic regurgitation. Mild (1+) tricuspid regurgitation.    Peripheral Angiogram (10.17.22):  Underwent Successful CSI Atherectomy Balloon Angioplasty and Stenting of the Right SFA and CSI Atherectomy Balloon Angioplasty of Right Anterior Tibial Artery Using Pedal Approach.    Peripheral Angiogram (9.12.22):  Underwent Successful Left SFA Laser Atherectomy Balloon Angioplasty Using Left Radial Artery Approach.    Review of Systems   Constitutional: Positive for malaise/fatigue. Negative for fever.   Cardiovascular:  Negative for chest pain and leg swelling.   Respiratory:  Negative for shortness of breath.    Skin:         Scrotal Wound   All  other systems reviewed and are negative.    Objective:     Vital Signs (Most Recent):  Temp: 98.1 °F (36.7 °C) (03/28/24 1101)  Pulse: 75 (03/28/24 1226)  Resp: 18 (03/28/24 1226)  BP: 126/87 (03/28/24 1101)  SpO2: (!) 91 % (03/28/24 1101) Vital Signs (24h Range):  Temp:  [97.4 °F (36.3 °C)-98.3 °F (36.8 °C)] 98.1 °F (36.7 °C)  Pulse:  [70-98] 75  Resp:  [16-22] 18  SpO2:  [87 %-98 %] 91 %  BP: (105-126)/(51-87) 126/87   Weight: 95 kg (209 lb 7 oz)  Body mass index is 29.21 kg/m².  SpO2: (!) 91 %       Intake/Output Summary (Last 24 hours) at 3/28/2024 1248  Last data filed at 3/28/2024 0206  Gross per 24 hour   Intake 600 ml   Output 1775 ml   Net -1175 ml       Lines/Drains/Airways       Drain  Duration                  Urethral Catheter 03/18/24 1426 Straight-tip 16 Fr. 9 days              Peripheral Intravenous Line  Duration                  Peripheral IV - Single Lumen 03/24/24 1634 18 G Anterior;Left Forearm 3 days                  Significant Labs:   Recent Results (from the past 72 hour(s))   Uric Acid    Collection Time: 03/25/24  1:44 PM   Result Value Ref Range    Uric Acid 3.1 (L) 3.5 - 7.2 mg/dL   Sodium, Random Urine    Collection Time: 03/25/24  5:56 PM   Result Value Ref Range    Urine Sodium 75.0 mmol/L   Creatinine, Random Urine    Collection Time: 03/25/24  5:56 PM   Result Value Ref Range    Urine Creatinine 143.4 63.0 - 166.0 mg/dL   Urea Nitrogen, Random Urine    Collection Time: 03/25/24  5:56 PM   Result Value Ref Range    Urine Urea Nitrogen 677.0 mg/dL   Osmolality, Urine    Collection Time: 03/25/24  5:56 PM   Result Value Ref Range    Urine Osmolality 527 300 - 1,300 mOsm/kg   Triglycerides    Collection Time: 03/26/24  4:49 AM   Result Value Ref Range    Triglyceride 58 34 - 140 mg/dL   Comprehensive metabolic panel    Collection Time: 03/26/24  4:49 AM   Result Value Ref Range    Sodium Level 142 136 - 145 mmol/L    Potassium Level 4.0 3.5 - 5.1 mmol/L    Chloride 107 98 - 107 mmol/L     Carbon Dioxide 26 23 - 31 mmol/L    Glucose Level 89 82 - 115 mg/dL    Blood Urea Nitrogen 12.4 8.4 - 25.7 mg/dL    Creatinine 1.14 0.73 - 1.18 mg/dL    Calcium Level Total 8.1 (L) 8.8 - 10.0 mg/dL    Protein Total 5.8 5.8 - 7.6 gm/dL    Albumin Level 2.4 (L) 3.4 - 4.8 g/dL    Globulin 3.4 2.4 - 3.5 gm/dL    Albumin/Globulin Ratio 0.7 (L) 1.1 - 2.0 ratio    Bilirubin Total 0.9 <=1.5 mg/dL    Alkaline Phosphatase 67 40 - 150 unit/L    Alanine Aminotransferase 10 0 - 55 unit/L    Aspartate Aminotransferase 18 5 - 34 unit/L    eGFR >60 mls/min/1.73/m2   Magnesium    Collection Time: 03/26/24  4:49 AM   Result Value Ref Range    Magnesium Level 2.00 1.60 - 2.60 mg/dL   Phosphorus    Collection Time: 03/26/24  4:49 AM   Result Value Ref Range    Phosphorus Level 3.0 2.3 - 4.7 mg/dL   CBC with Differential    Collection Time: 03/26/24  4:49 AM   Result Value Ref Range    WBC 7.64 4.50 - 11.50 x10(3)/mcL    RBC 2.82 (L) 4.70 - 6.10 x10(6)/mcL    Hgb 8.5 (L) 14.0 - 18.0 g/dL    Hct 26.8 (L) 42.0 - 52.0 %    MCV 95.0 (H) 80.0 - 94.0 fL    MCH 30.1 27.0 - 31.0 pg    MCHC 31.7 (L) 33.0 - 36.0 g/dL    RDW 16.8 11.5 - 17.0 %    Platelet 381 130 - 400 x10(3)/mcL    MPV 9.6 7.4 - 10.4 fL    Neut % 66.7 %    Lymph % 24.1 %    Mono % 6.8 %    Eos % 1.0 %    Basophil % 0.5 %    Lymph # 1.84 0.6 - 4.6 x10(3)/mcL    Neut # 5.09 2.1 - 9.2 x10(3)/mcL    Mono # 0.52 0.1 - 1.3 x10(3)/mcL    Eos # 0.08 0 - 0.9 x10(3)/mcL    Baso # 0.04 <=0.2 x10(3)/mcL    IG# 0.07 (H) 0 - 0.04 x10(3)/mcL    IG% 0.9 %    NRBC% 0.0 %   EKG 12-lead    Collection Time: 03/26/24  5:46 AM   Result Value Ref Range    QRS Duration 96 ms    [facility] QTC Calculation 503 ms   Triglycerides    Collection Time: 03/27/24  4:22 AM   Result Value Ref Range    Triglyceride 60 34 - 140 mg/dL   Comprehensive metabolic panel    Collection Time: 03/27/24  4:22 AM   Result Value Ref Range    Sodium Level 140 136 - 145 mmol/L    Potassium Level 3.7 3.5 - 5.1 mmol/L    Chloride 106  98 - 107 mmol/L    Carbon Dioxide 24 23 - 31 mmol/L    Glucose Level 92 82 - 115 mg/dL    Blood Urea Nitrogen 9.7 8.4 - 25.7 mg/dL    Creatinine 1.05 0.73 - 1.18 mg/dL    Calcium Level Total 8.7 (L) 8.8 - 10.0 mg/dL    Protein Total 6.5 5.8 - 7.6 gm/dL    Albumin Level 2.4 (L) 3.4 - 4.8 g/dL    Globulin 4.1 (H) 2.4 - 3.5 gm/dL    Albumin/Globulin Ratio 0.6 (L) 1.1 - 2.0 ratio    Bilirubin Total 0.8 <=1.5 mg/dL    Alkaline Phosphatase 68 40 - 150 unit/L    Alanine Aminotransferase 9 0 - 55 unit/L    Aspartate Aminotransferase 18 5 - 34 unit/L    eGFR >60 mls/min/1.73/m2   Magnesium    Collection Time: 03/27/24  4:22 AM   Result Value Ref Range    Magnesium Level 2.00 1.60 - 2.60 mg/dL   Phosphorus    Collection Time: 03/27/24  4:22 AM   Result Value Ref Range    Phosphorus Level 2.9 2.3 - 4.7 mg/dL   CBC with Differential    Collection Time: 03/27/24  4:22 AM   Result Value Ref Range    WBC 7.81 4.50 - 11.50 x10(3)/mcL    RBC 2.78 (L) 4.70 - 6.10 x10(6)/mcL    Hgb 8.5 (L) 14.0 - 18.0 g/dL    Hct 26.6 (L) 42.0 - 52.0 %    MCV 95.7 (H) 80.0 - 94.0 fL    MCH 30.6 27.0 - 31.0 pg    MCHC 32.0 (L) 33.0 - 36.0 g/dL    RDW 16.9 11.5 - 17.0 %    Platelet 397 130 - 400 x10(3)/mcL    MPV 9.6 7.4 - 10.4 fL    Neut % 69.2 %    Lymph % 20.5 %    Mono % 7.6 %    Eos % 1.3 %    Basophil % 0.5 %    Lymph # 1.60 0.6 - 4.6 x10(3)/mcL    Neut # 5.41 2.1 - 9.2 x10(3)/mcL    Mono # 0.59 0.1 - 1.3 x10(3)/mcL    Eos # 0.10 0 - 0.9 x10(3)/mcL    Baso # 0.04 <=0.2 x10(3)/mcL    IG# 0.07 (H) 0 - 0.04 x10(3)/mcL    IG% 0.9 %    NRBC% 0.0 %   EKG 12-lead    Collection Time: 03/27/24  5:49 AM   Result Value Ref Range    QRS Duration 94 ms    [facility] QTC Calculation 508 ms   Echo Saline Bubble? No    Collection Time: 03/27/24 10:57 AM   Result Value Ref Range    BSA 2.12 m2   Triglycerides    Collection Time: 03/28/24  3:52 AM   Result Value Ref Range    Triglyceride 57 34 - 140 mg/dL   Comprehensive metabolic panel    Collection Time: 03/28/24  3:52  AM   Result Value Ref Range    Sodium Level 141 136 - 145 mmol/L    Potassium Level 4.2 3.5 - 5.1 mmol/L    Chloride 107 98 - 107 mmol/L    Carbon Dioxide 26 23 - 31 mmol/L    Glucose Level 87 82 - 115 mg/dL    Blood Urea Nitrogen 10.1 8.4 - 25.7 mg/dL    Creatinine 0.93 0.73 - 1.18 mg/dL    Calcium Level Total 8.3 (L) 8.8 - 10.0 mg/dL    Protein Total 5.9 5.8 - 7.6 gm/dL    Albumin Level 2.4 (L) 3.4 - 4.8 g/dL    Globulin 3.5 2.4 - 3.5 gm/dL    Albumin/Globulin Ratio 0.7 (L) 1.1 - 2.0 ratio    Bilirubin Total 0.6 <=1.5 mg/dL    Alkaline Phosphatase 65 40 - 150 unit/L    Alanine Aminotransferase 7 0 - 55 unit/L    Aspartate Aminotransferase 15 5 - 34 unit/L    eGFR >60 mls/min/1.73/m2   Magnesium    Collection Time: 03/28/24  3:52 AM   Result Value Ref Range    Magnesium Level 2.00 1.60 - 2.60 mg/dL   Phosphorus    Collection Time: 03/28/24  3:52 AM   Result Value Ref Range    Phosphorus Level 3.1 2.3 - 4.7 mg/dL   CBC with Differential    Collection Time: 03/28/24  3:52 AM   Result Value Ref Range    WBC 8.06 4.50 - 11.50 x10(3)/mcL    RBC 2.71 (L) 4.70 - 6.10 x10(6)/mcL    Hgb 8.4 (L) 14.0 - 18.0 g/dL    Hct 26.9 (L) 42.0 - 52.0 %    MCV 99.3 (H) 80.0 - 94.0 fL    MCH 31.0 27.0 - 31.0 pg    MCHC 31.2 (L) 33.0 - 36.0 g/dL    RDW 17.2 (H) 11.5 - 17.0 %    Platelet 380 130 - 400 x10(3)/mcL    MPV 10.1 7.4 - 10.4 fL    NRBC% 0.0 %   Manual Differential    Collection Time: 03/28/24  3:52 AM   Result Value Ref Range    WBC 8.06 x10(3)/mcL    Neutrophils % 76 %    Lymphs % 17 %    Monocytes % 7 %    nRBC % 1 %    Neutrophils Abs 6.1256 2.1 - 9.2 x10(3)/mcL    Lymphs Abs 1.3702 0.6 - 4.6 x10(3)/mcL    Monocytes Abs 0.5642 0.1 - 1.3 x10(3)/mcL    Platelets Normal Normal, Adequate    RBC Morph Abnormal (A) Normal    Poikilocytosis 1+ (A) (none)    Anisocytosis 1+ (A) (none)   Echo    Collection Time: 03/28/24 11:43 AM   Result Value Ref Range    BSA 2.12 m2    Quintana's Biplane MOD Ejection Fraction 46 %    LVIDd 5.35 3.5 -  6.0 cm    LV Systolic Volume 91.00 mL    LV Systolic Volume Index 42.3 mL/m2    LVIDs 4.47 (A) 2.1 - 4.0 cm    LV Diastolic Volume 138.00 mL    LV Diastolic Volume Index 64.19 mL/m2    IVS 1.81 (A) 0.6 - 1.1 cm    FS 16 (A) 28 - 44 %    Left Ventricle Relative Wall Thickness 0.59 cm    Posterior Wall 1.58 (A) 0.6 - 1.1 cm    LV mass 428.66 g    LV Mass Index 199 g/m2    RVDD 3.07 cm    Vn Nyquist MS 0.35 m/s    Radius 1.20 cm    Vn Nyquist 0.35 m/s    Mr max trevon 3.48 m/s    MR PISA EROA 0.91 cm2    ZLVIDS 0.40     ZLVIDD -2.66      Telemetry: PAF/CVR    Physical Exam  Vitals reviewed.   Constitutional:       General: He is not in acute distress.     Appearance: Normal appearance. He is ill-appearing.   HENT:      Head: Normocephalic.      Mouth/Throat:      Mouth: Mucous membranes are moist.   Eyes:      Conjunctiva/sclera: Conjunctivae normal.   Cardiovascular:      Rate and Rhythm: Normal rate. Rhythm irregular.      Heart sounds: Murmur heard.      Comments: Right wrist soft, nontender. No hematoma noted. + 2 peripheral pulse  Pulmonary:      Effort: Pulmonary effort is normal. No respiratory distress.      Breath sounds: Decreased breath sounds present.      Comments: Simple Face Mask  Genitourinary:     Comments: Scrotal Sling in Place, Dressing C/D/I  Musculoskeletal:         General: No swelling.      Right lower leg: No edema.      Left lower leg: No edema.   Neurological:      General: No focal deficit present.      Mental Status: He is alert and oriented to person, place, and time.   Psychiatric:         Mood and Affect: Mood normal.         Behavior: Behavior normal.         Judgment: Judgment normal.       Current Inpatient Medications:    Current Facility-Administered Medications:     0.9%  NaCl infusion (for blood administration), , Intravenous, Q24H PRN, Carmen Griggs, FNP    0.9%  NaCl infusion (for blood administration), , Intravenous, Q24H PRN, Joshua Hernandez, ANP    acetaminophen tablet 650  mg, 650 mg, Oral, Q8H PRN, Harris Hernandez MD    albuterol-ipratropium 2.5 mg-0.5 mg/3 mL nebulizer solution 3 mL, 3 mL, Nebulization, Once, Natali Seymour MD    albuterol-ipratropium 2.5 mg-0.5 mg/3 mL nebulizer solution 3 mL, 3 mL, Nebulization, Q4H, Natali Seymour MD, 3 mL at 03/28/24 1226    [START ON 3/30/2024] amiodarone tablet 200 mg, 200 mg, Oral, BID, Davida Mo, FNP    [START ON 4/3/2024] amiodarone tablet 200 mg, 200 mg, Oral, Daily, Davida Mo FNP    amiodarone tablet 400 mg, 400 mg, Oral, BID, Davida Mo FNP, 400 mg at 03/28/24 0812    amoxicillin-clavulanate 875-125mg per tablet 1 tablet, 1 tablet, Oral, Q12H, Natali Seymour MD, 1 tablet at 03/28/24 0812    aspirin EC tablet 81 mg, 81 mg, Oral, Daily, Jori Santos MD    atorvastatin tablet 80 mg, 80 mg, Oral, Daily, Cassidy Obrien MD, 80 mg at 03/28/24 0812    chlorhexidine 0.12 % solution 15 mL, 15 mL, Mouth/Throat, BID, David Gonsalez MD, 15 mL at 03/28/24 0812    dextrose 10 % infusion, , Intravenous, PRN, David Gonsalez MD    dextrose 10 % infusion, , Intravenous, PRN, David Gonsalez MD    dextrose 10% bolus 125 mL 125 mL, 12.5 g, Intravenous, PRN, David Gonsalez MD    dextrose 10% bolus 250 mL 250 mL, 25 g, Intravenous, PRN, David Gonsalez MD    enoxaparin injection 90 mg, 1 mg/kg, Subcutaneous, Q12H (prophylaxis, 0900/2100), Natali Seymour MD, 90 mg at 03/28/24 0812    folic acid tablet 1 mg, 1 mg, Oral, Daily, Jenna Miller MD, 1 mg at 03/28/24 0812    hydrALAZINE injection 10 mg, 10 mg, Intravenous, Q4H PRN, Adriano Montiel MD    insulin aspart U-100 injection 0-10 Units, 0-10 Units, Subcutaneous, Q6H PRN, Narendra Pradhan DO    ipratropium 0.02 % nebulizer solution 0.5 mg, 0.5 mg, Nebulization, Q6H PRN, David Gonsalez MD    LORazepam tablet 2 mg, 2 mg, Oral, Q4H PRN, Jenna Miller MD, 2 mg at 03/21/24 0031    melatonin tablet 6 mg, 6 mg, Oral, Nightly PRN, David  Harris PAUL MD, 6 mg at 03/27/24 2026    midodrine tablet 10 mg, 10 mg, Oral, TID WM, Narendra Pradhan, DO, 10 mg at 03/28/24 1057    morphine injection 2 mg, 2 mg, Intravenous, Q6H PRN, David Gonsalez MD, 2 mg at 03/28/24 1057    multivitamin tablet, 1 tablet, Oral, Daily, Jenna Miller MD, 1 tablet at 03/28/24 0812    ondansetron injection 4 mg, 4 mg, Intravenous, Q8H PRN, Adriano Montiel MD    oxyCODONE immediate release tablet 5 mg, 5 mg, Oral, Q4H PRN, Harris Hernandez MD, 5 mg at 03/28/24 0552    pantoprazole injection 40 mg, 40 mg, Intravenous, BID, Cassidy Obrien MD, 40 mg at 03/28/24 0806    phenazopyridine tablet 100 mg, 100 mg, Oral, TID WM, Jori Santos MD    sodium chloride 0.9% flush 10 mL, 10 mL, Intravenous, PRN, Narendra Pradhan, DO, 10 mL at 03/27/24 2028    sodium chloride 0.9% flush 10 mL, 10 mL, Intravenous, PRN, David Gonsalez MD    sucralfate tablet 1 g, 1 g, Oral, QID (AC & HS), Jenna Miller MD, 1 g at 03/28/24 1057    Facility-Administered Medications Ordered in Other Encounters:     0.9%  NaCl infusion, , Intravenous, Continuous, Shannon Milligan MD, Last Rate: 10 mL/hr at 03/09/23 1020, New Bag at 03/09/23 1020    diphenhydrAMINE injection 25 mg, 25 mg, Intravenous, Once PRN, Shannon Milligan MD    LIDOcaine (PF) 10 mg/ml (1%) injection 10 mg, 1 mL, Intradermal, Once, Lanette Ulloa FNP    LIDOcaine (PF) 10 mg/ml (1%) injection 10 mg, 1 mL, Intradermal, Once, Shannon Milligan MD    ondansetron injection 4 mg, 4 mg, Intravenous, Once, Shannon Milligan MD    prochlorperazine injection Soln 5 mg, 5 mg, Intravenous, Once PRN, Shannon Milligan MD  VTE Risk Mitigation (From admission, onward)           Ordered     enoxaparin injection 90 mg  Every 12 hours         03/24/24 0901     IP VTE LOW RISK PATIENT  Once         03/19/24 0422     Place sequential compression device  Until discontinued         03/18/24 0124                  Assessment:    Cardiac Arrest/VT (Post Operative Left Héctor Orchiectomy - in PACU 3.18.24)     - Requiring Multiple Defibrillations (x 3)  NSTEMI Type 2 in the Setting of Cardiac Arrest, Anemia  Anemia - Stable    - Cleared by GI for DAPT/AC    - Requiring Transfusion    - EGD (3.22.24) - LA Grade B Reflux Esophagitis with No Bleeding, Chronic Gastritis, Protonix; Colonoscopy as OP 2/2 Scrotal Wound  Chronic Atrial Fibrillation - Now AF CVR (HR 50's-60s with Intermittent PVCS)    - Xarelto on Hold Post Scrotal Abscess I&D (On FD Lovenox)  Non Ischemic Cardiomyopathy     - EF 30-40%  CAD (Details Unclear)  Acute Hypoxemic Respiratory Failure requiring Intubation/Ventilation - Now Extubated on Supplemental O2  Hypotension Requiring Vasopressor Support - Resolved     - History of Hypertension  Valvular Heart Disease    - MR: Moderate, TR: Mild to Moderate  Hyperlipidemia    - On Statin  SIRS - Likely UTI Related - Resolved     - BC x 2 Negative at 5 Days  Scrotal Abscess    - Status Post Surgical Exploration, Left Orchiectomy, & Debridement of Wound/Wound Packing (3.18.24)   COPD  Partial Bowel Obstruction - Resolved   Long Term Oral Anticoagulation  PAD  Infrarenal Abdominal Aortic Aneurysmal Dilatation with Mural Thrombus (Stable)    - Maximum diameter is 3.6 cm without significant interval change   Acute Human Metapneumovirus Infection (On Droplet Precautions) - Off Precautions    Plan:   Continue PO amio load. Had 5 beat run VT yesterday.     GI Cleared PT for DAPT/AC. Started on ASA 81 mg daily. Continue FD lovenox for CVA prophylaxis in the setting of PAF. Resume Xarelto prior to discharge.   Keep K > 4.0 and Mg > 2.0   Antibiotic Management/Wound Care as per Primary Team  Limited echo pending for qualification for lifevest.   Labs in AM: CBC, CMP and Mg    MALI Solo  Cardiology  [facility]  03/28/2024

## 2024-03-29 LAB
ALBUMIN SERPL-MCNC: 2.8 G/DL (ref 3.4–4.8)
ALBUMIN/GLOB SERPL: 0.7 RATIO (ref 1.1–2)
ALLENS TEST BLOOD GAS (OHS): YES
ALP SERPL-CCNC: 80 UNIT/L (ref 40–150)
ALT SERPL-CCNC: 8 UNIT/L (ref 0–55)
AST SERPL-CCNC: 20 UNIT/L (ref 5–34)
B PERT.PT PRMT NPH QL NAA+NON-PROBE: NOT DETECTED
BASE EXCESS BLD CALC-SCNC: 0.9 MMOL/L (ref -2–2)
BASOPHILS # BLD AUTO: 0.05 X10(3)/MCL
BASOPHILS NFR BLD AUTO: 0.5 %
BILIRUB SERPL-MCNC: 0.8 MG/DL
BIPAP(E) BLOOD GAS (OHS): 5 CM H2O
BIPAP(I) BLOOD GAS (OHS): 12 CM H2O
BLOOD GAS SAMPLE TYPE (OHS): ABNORMAL
BUN SERPL-MCNC: 14.9 MG/DL (ref 8.4–25.7)
C PNEUM DNA NPH QL NAA+NON-PROBE: NOT DETECTED
CA-I BLD-SCNC: 1.13 MMOL/L (ref 1.12–1.23)
CALCIUM SERPL-MCNC: 8.9 MG/DL (ref 8.8–10)
CHLORIDE SERPL-SCNC: 106 MMOL/L (ref 98–107)
CO2 BLDA-SCNC: 26.4 MMOL/L
CO2 SERPL-SCNC: 24 MMOL/L (ref 23–31)
COHGB MFR BLDA: 1.9 % (ref 0.5–1.5)
CREAT SERPL-MCNC: 1.03 MG/DL (ref 0.73–1.18)
DRAWN BY BLOOD GAS (OHS): ABNORMAL
EOSINOPHIL # BLD AUTO: 0.05 X10(3)/MCL (ref 0–0.9)
EOSINOPHIL NFR BLD AUTO: 0.5 %
ERYTHROCYTE [DISTWIDTH] IN BLOOD BY AUTOMATED COUNT: 17.2 % (ref 11.5–17)
GFR SERPLBLD CREATININE-BSD FMLA CKD-EPI: >60 MLS/MIN/1.73/M2
GLOBULIN SER-MCNC: 4 GM/DL (ref 2.4–3.5)
GLUCOSE SERPL-MCNC: 106 MG/DL (ref 82–115)
HADV DNA NPH QL NAA+NON-PROBE: NOT DETECTED
HCO3 BLDA-SCNC: 25.2 MMOL/L (ref 22–26)
HCOV 229E RNA NPH QL NAA+NON-PROBE: NOT DETECTED
HCOV HKU1 RNA NPH QL NAA+NON-PROBE: NOT DETECTED
HCOV NL63 RNA NPH QL NAA+NON-PROBE: NOT DETECTED
HCOV OC43 RNA NPH QL NAA+NON-PROBE: NOT DETECTED
HCT VFR BLD AUTO: 29.5 % (ref 42–52)
HGB BLD-MCNC: 9.2 G/DL (ref 14–18)
HMPV RNA NPH QL NAA+NON-PROBE: NOT DETECTED
HPIV1 RNA NPH QL NAA+NON-PROBE: NOT DETECTED
HPIV2 RNA NPH QL NAA+NON-PROBE: NOT DETECTED
HPIV3 RNA NPH QL NAA+NON-PROBE: NOT DETECTED
HPIV4 RNA NPH QL NAA+NON-PROBE: NOT DETECTED
IMM GRANULOCYTES # BLD AUTO: 0.06 X10(3)/MCL (ref 0–0.04)
IMM GRANULOCYTES NFR BLD AUTO: 0.6 %
INHALED O2 CONCENTRATION: 40 %
LYMPHOCYTES # BLD AUTO: 1.05 X10(3)/MCL (ref 0.6–4.6)
LYMPHOCYTES NFR BLD AUTO: 10 %
M PNEUMO DNA NPH QL NAA+NON-PROBE: NOT DETECTED
MAGNESIUM SERPL-MCNC: 2 MG/DL (ref 1.6–2.6)
MCH RBC QN AUTO: 30.5 PG (ref 27–31)
MCHC RBC AUTO-ENTMCNC: 31.2 G/DL (ref 33–36)
MCV RBC AUTO: 97.7 FL (ref 80–94)
METHGB MFR BLDA: 0.6 % (ref 0.4–1.5)
MODE (OHS): ABNORMAL
MONOCYTES # BLD AUTO: 0.66 X10(3)/MCL (ref 0.1–1.3)
MONOCYTES NFR BLD AUTO: 6.3 %
MRSA PCR SCRN (OHS): NOT DETECTED
NEUTROPHILS # BLD AUTO: 8.68 X10(3)/MCL (ref 2.1–9.2)
NEUTROPHILS NFR BLD AUTO: 82.1 %
NRBC BLD AUTO-RTO: 0 %
O2 HB BLOOD GAS (OHS): 91.9 % (ref 94–97)
OXYHGB MFR BLDA: 9.3 G/DL (ref 12–16)
PCO2 BLDA: 38 MMHG (ref 35–45)
PH BLDA: 7.43 [PH] (ref 7.35–7.45)
PHOSPHATE SERPL-MCNC: 4.1 MG/DL (ref 2.3–4.7)
PLATELET # BLD AUTO: 380 X10(3)/MCL (ref 130–400)
PMV BLD AUTO: 10 FL (ref 7.4–10.4)
PO2 BLDA: 65 MMHG (ref 80–100)
POTASSIUM BLOOD GAS (OHS): 4 MMOL/L (ref 3.5–5)
POTASSIUM SERPL-SCNC: 4.7 MMOL/L (ref 3.5–5.1)
PROT SERPL-MCNC: 6.8 GM/DL (ref 5.8–7.6)
RBC # BLD AUTO: 3.02 X10(6)/MCL (ref 4.7–6.1)
RSV RNA NPH QL NAA+NON-PROBE: NOT DETECTED
RV+EV RNA NPH QL NAA+NON-PROBE: NOT DETECTED
SAMPLE SITE BLOOD GAS (OHS): ABNORMAL
SAO2 % BLDA: 93 %
SODIUM BLOOD GAS (OHS): 137 MMOL/L (ref 137–145)
SODIUM SERPL-SCNC: 142 MMOL/L (ref 136–145)
TRIGL SERPL-MCNC: 46 MG/DL (ref 34–140)
TROPONIN I SERPL-MCNC: 0.03 NG/ML (ref 0–0.04)
WBC # SPEC AUTO: 10.55 X10(3)/MCL (ref 4.5–11.5)

## 2024-03-29 PROCEDURE — 94760 N-INVAS EAR/PLS OXIMETRY 1: CPT | Mod: XB

## 2024-03-29 PROCEDURE — 85025 COMPLETE CBC W/AUTO DIFF WBC: CPT | Performed by: INTERNAL MEDICINE

## 2024-03-29 PROCEDURE — 84100 ASSAY OF PHOSPHORUS: CPT | Performed by: INTERNAL MEDICINE

## 2024-03-29 PROCEDURE — 82803 BLOOD GASES ANY COMBINATION: CPT

## 2024-03-29 PROCEDURE — 21400001 HC TELEMETRY ROOM

## 2024-03-29 PROCEDURE — 84484 ASSAY OF TROPONIN QUANT: CPT | Performed by: INTERNAL MEDICINE

## 2024-03-29 PROCEDURE — 94640 AIRWAY INHALATION TREATMENT: CPT

## 2024-03-29 PROCEDURE — 83735 ASSAY OF MAGNESIUM: CPT | Performed by: INTERNAL MEDICINE

## 2024-03-29 PROCEDURE — 93010 ELECTROCARDIOGRAM REPORT: CPT | Mod: ,,, | Performed by: INTERNAL MEDICINE

## 2024-03-29 PROCEDURE — 99900035 HC TECH TIME PER 15 MIN (STAT)

## 2024-03-29 PROCEDURE — 27100171 HC OXYGEN HIGH FLOW UP TO 24 HOURS

## 2024-03-29 PROCEDURE — 87641 MR-STAPH DNA AMP PROBE: CPT | Performed by: INTERNAL MEDICINE

## 2024-03-29 PROCEDURE — 25000003 PHARM REV CODE 250

## 2024-03-29 PROCEDURE — 63600175 PHARM REV CODE 636 W HCPCS: Performed by: INTERNAL MEDICINE

## 2024-03-29 PROCEDURE — 99900031 HC PATIENT EDUCATION (STAT)

## 2024-03-29 PROCEDURE — 63600175 PHARM REV CODE 636 W HCPCS: Mod: JZ,JG

## 2024-03-29 PROCEDURE — 25000003 PHARM REV CODE 250: Performed by: INTERNAL MEDICINE

## 2024-03-29 PROCEDURE — 84478 ASSAY OF TRIGLYCERIDES: CPT

## 2024-03-29 PROCEDURE — 25000003 PHARM REV CODE 250: Performed by: STUDENT IN AN ORGANIZED HEALTH CARE EDUCATION/TRAINING PROGRAM

## 2024-03-29 PROCEDURE — 36600 WITHDRAWAL OF ARTERIAL BLOOD: CPT

## 2024-03-29 PROCEDURE — 87798 DETECT AGENT NOS DNA AMP: CPT | Performed by: INTERNAL MEDICINE

## 2024-03-29 PROCEDURE — 27000190 HC CPAP FULL FACE MASK W/VALVE

## 2024-03-29 PROCEDURE — A4216 STERILE WATER/SALINE, 10 ML: HCPCS | Performed by: STUDENT IN AN ORGANIZED HEALTH CARE EDUCATION/TRAINING PROGRAM

## 2024-03-29 PROCEDURE — 25000242 PHARM REV CODE 250 ALT 637 W/ HCPCS: Performed by: INTERNAL MEDICINE

## 2024-03-29 PROCEDURE — 94660 CPAP INITIATION&MGMT: CPT

## 2024-03-29 PROCEDURE — C9113 INJ PANTOPRAZOLE SODIUM, VIA: HCPCS | Performed by: INTERNAL MEDICINE

## 2024-03-29 PROCEDURE — 93005 ELECTROCARDIOGRAM TRACING: CPT

## 2024-03-29 PROCEDURE — 80053 COMPREHEN METABOLIC PANEL: CPT | Performed by: INTERNAL MEDICINE

## 2024-03-29 RX ORDER — LEVOFLOXACIN 5 MG/ML
750 INJECTION, SOLUTION INTRAVENOUS
Status: DISCONTINUED | OUTPATIENT
Start: 2024-03-29 | End: 2024-03-29

## 2024-03-29 RX ORDER — HALOPERIDOL 5 MG/ML
2 INJECTION INTRAMUSCULAR ONCE
Status: COMPLETED | OUTPATIENT
Start: 2024-03-29 | End: 2024-03-29

## 2024-03-29 RX ORDER — FUROSEMIDE 10 MG/ML
40 INJECTION INTRAMUSCULAR; INTRAVENOUS DAILY
Status: DISCONTINUED | OUTPATIENT
Start: 2024-03-29 | End: 2024-04-01

## 2024-03-29 RX ORDER — DOXYCYCLINE HYCLATE 100 MG
100 TABLET ORAL EVERY 12 HOURS
Status: DISCONTINUED | OUTPATIENT
Start: 2024-03-29 | End: 2024-04-03

## 2024-03-29 RX ORDER — METHYLPREDNISOLONE SOD SUCC 125 MG
125 VIAL (EA) INJECTION ONCE
Status: COMPLETED | OUTPATIENT
Start: 2024-03-29 | End: 2024-03-29

## 2024-03-29 RX ORDER — FUROSEMIDE 10 MG/ML
40 INJECTION INTRAMUSCULAR; INTRAVENOUS DAILY
Status: DISCONTINUED | OUTPATIENT
Start: 2024-03-29 | End: 2024-03-29

## 2024-03-29 RX ORDER — FUROSEMIDE 10 MG/ML
40 INJECTION INTRAMUSCULAR; INTRAVENOUS ONCE
Status: COMPLETED | OUTPATIENT
Start: 2024-03-29 | End: 2024-03-29

## 2024-03-29 RX ORDER — METHYLPREDNISOLONE SOD SUCC 125 MG
VIAL (EA) INJECTION
Status: COMPLETED
Start: 2024-03-29 | End: 2024-03-29

## 2024-03-29 RX ADMIN — MORPHINE SULFATE 2 MG: 4 INJECTION, SOLUTION INTRAMUSCULAR; INTRAVENOUS at 09:03

## 2024-03-29 RX ADMIN — DOXYCYCLINE HYCLATE 100 MG: 100 TABLET, COATED ORAL at 09:03

## 2024-03-29 RX ADMIN — THERA TABS 1 TABLET: TAB at 10:03

## 2024-03-29 RX ADMIN — ASPIRIN 81 MG: 81 TABLET, COATED ORAL at 10:03

## 2024-03-29 RX ADMIN — METHYLPREDNISOLONE SODIUM SUCCINATE 80 MG: 40 INJECTION, POWDER, FOR SOLUTION INTRAMUSCULAR; INTRAVENOUS at 06:03

## 2024-03-29 RX ADMIN — Medication 10 ML: at 10:03

## 2024-03-29 RX ADMIN — FOLIC ACID 1 MG: 1 TABLET ORAL at 10:03

## 2024-03-29 RX ADMIN — CHLORHEXIDINE GLUCONATE 0.12% ORAL RINSE 15 ML: 1.2 LIQUID ORAL at 09:03

## 2024-03-29 RX ADMIN — IPRATROPIUM BROMIDE AND ALBUTEROL SULFATE 3 ML: 2.5; .5 SOLUTION RESPIRATORY (INHALATION) at 12:03

## 2024-03-29 RX ADMIN — PIPERACILLIN SODIUM AND TAZOBACTAM SODIUM 4.5 G: 4; .5 INJECTION, POWDER, LYOPHILIZED, FOR SOLUTION INTRAVENOUS at 09:03

## 2024-03-29 RX ADMIN — ENOXAPARIN SODIUM 90 MG: 100 INJECTION SUBCUTANEOUS at 09:03

## 2024-03-29 RX ADMIN — OXYCODONE HYDROCHLORIDE 5 MG: 5 TABLET ORAL at 10:03

## 2024-03-29 RX ADMIN — PHENAZOPYRIDINE HYDROCHLORIDE 100 MG: 100 TABLET ORAL at 04:03

## 2024-03-29 RX ADMIN — IPRATROPIUM BROMIDE AND ALBUTEROL SULFATE 3 ML: 2.5; .5 SOLUTION RESPIRATORY (INHALATION) at 11:03

## 2024-03-29 RX ADMIN — FUROSEMIDE 40 MG: 10 INJECTION, SOLUTION INTRAMUSCULAR; INTRAVENOUS at 12:03

## 2024-03-29 RX ADMIN — Medication 6 MG: at 09:03

## 2024-03-29 RX ADMIN — HALOPERIDOL LACTATE 2 MG: 5 INJECTION, SOLUTION INTRAMUSCULAR at 12:03

## 2024-03-29 RX ADMIN — LEVOFLOXACIN 750 MG: 750 INJECTION, SOLUTION INTRAVENOUS at 01:03

## 2024-03-29 RX ADMIN — MIDODRINE HYDROCHLORIDE 10 MG: 5 TABLET ORAL at 10:03

## 2024-03-29 RX ADMIN — IPRATROPIUM BROMIDE AND ALBUTEROL SULFATE 3 ML: 2.5; .5 SOLUTION RESPIRATORY (INHALATION) at 07:03

## 2024-03-29 RX ADMIN — IPRATROPIUM BROMIDE AND ALBUTEROL SULFATE 3 ML: 2.5; .5 SOLUTION RESPIRATORY (INHALATION) at 08:03

## 2024-03-29 RX ADMIN — MIDODRINE HYDROCHLORIDE 10 MG: 5 TABLET ORAL at 04:03

## 2024-03-29 RX ADMIN — METHYLPREDNISOLONE SODIUM SUCCINATE 125 MG: 125 INJECTION, POWDER, FOR SOLUTION INTRAMUSCULAR; INTRAVENOUS at 12:03

## 2024-03-29 RX ADMIN — LORAZEPAM 2 MG: 1 TABLET ORAL at 09:03

## 2024-03-29 RX ADMIN — OXYCODONE HYDROCHLORIDE 5 MG: 5 TABLET ORAL at 09:03

## 2024-03-29 RX ADMIN — AMIODARONE HYDROCHLORIDE 400 MG: 200 TABLET ORAL at 10:03

## 2024-03-29 RX ADMIN — ATORVASTATIN CALCIUM 80 MG: 40 TABLET, FILM COATED ORAL at 10:03

## 2024-03-29 RX ADMIN — PIPERACILLIN SODIUM AND TAZOBACTAM SODIUM 4.5 G: 4; .5 INJECTION, POWDER, LYOPHILIZED, FOR SOLUTION INTRAVENOUS at 12:03

## 2024-03-29 RX ADMIN — PANTOPRAZOLE SODIUM 40 MG: 40 INJECTION, POWDER, FOR SOLUTION INTRAVENOUS at 10:03

## 2024-03-29 RX ADMIN — PIPERACILLIN AND TAZOBACTAM 4.5 G: 4; .5 INJECTION, POWDER, LYOPHILIZED, FOR SOLUTION INTRAVENOUS; PARENTERAL at 05:03

## 2024-03-29 RX ADMIN — DOXYCYCLINE HYCLATE 100 MG: 100 TABLET, COATED ORAL at 12:03

## 2024-03-29 RX ADMIN — PHENAZOPYRIDINE HYDROCHLORIDE 100 MG: 100 TABLET ORAL at 10:03

## 2024-03-29 RX ADMIN — AMIODARONE HYDROCHLORIDE 400 MG: 200 TABLET ORAL at 09:03

## 2024-03-29 RX ADMIN — IPRATROPIUM BROMIDE AND ALBUTEROL SULFATE 3 ML: 2.5; .5 SOLUTION RESPIRATORY (INHALATION) at 04:03

## 2024-03-29 NOTE — PROGRESS NOTES
Hospital Medicine  Progress Note    Patient Name: Ran Reyes Jr.  MRN: [id number]  Status: IP- Inpatient   Admission Date: 3/17/2024  Length of Stay: 11  Date of Service: 03/29/2024       CC: hospital follow-up for respiratory failure       SUBJECTIVE   HPI and hospital course reviewed.   Today: Patient seen and examined at bedside, and chart reviewed.  Developed worsening hypoxemia overnight.  CXR has noted bilateral scattered opacities.  Antibiotics broadened, initiated on IV Lasix.  But has been requiring BiPAP overnight for oxygenation.  This morning he is stable on an FiO2 of 40%.  He has diuresed well overnight with to nearly -4L.      MEDICATIONS   Scheduled   albuterol-ipratropium  3 mL Nebulization Q4H    [START ON 3/30/2024] amiodarone  200 mg Oral BID    [START ON 4/3/2024] amiodarone  200 mg Oral Daily    amiodarone  400 mg Oral BID    aspirin  81 mg Oral Daily    atorvastatin  80 mg Oral Daily    chlorhexidine  15 mL Mouth/Throat BID    doxycycline  100 mg Oral Q12H    enoxparin  1 mg/kg Subcutaneous Q12H (prophylaxis, 0900/2100)    folic acid  1 mg Oral Daily    furosemide  40 mg Intravenous Daily    methylPREDNISolone sodium succinate injection  80 mg Intravenous Q12H    midodrine  10 mg Oral TID WM    multivitamin  1 tablet Oral Daily    pantoprazole  40 mg Intravenous BID    phenazopyridine  100 mg Oral TID WM    piperacillin-tazobactam (Zosyn) IV (PEDS and ADULTS) (extended infusion is not appropriate)  4.5 g Intravenous Q8H    sucralfate  1 g Oral QID (AC & HS)     Continuous Infusions  None      PHYSICAL EXAM   VITALS: T 97.6 °F (36.4 °C)   /88   P 66   RR (!) 24   O2 97 %    GENERAL: Awake and in NAD  LUNGS: decreased air movement in the bases  CVS: Normal rate  GI/: Soft, NT, bowel sounds positive.  EXTREMITIES: 1-2+ LE edema  NEURO: AAOx3  PSYCH: Cooperative      LABS   CBC  Recent Labs     03/28/24  0352 03/29/24  0040   WBC 8.06  8.06 10.55   RBC 2.71* 3.02*   HGB 8.4* 9.2*    HCT 26.9* 29.5*   MCV 99.3* 97.7*   MCH 31.0 30.5   MCHC 31.2* 31.2*   RDW 17.2* 17.2*    380     CHEM  Recent Labs     03/28/24  0352 03/29/24  0040    142   K 4.2 4.7   CHLORIDE 107 106   CO2 26 24   BUN 10.1 14.9   CREATININE 0.93 1.03   GLUCOSE 87 106   CALCIUM 8.3* 8.9   MG 2.00 2.00   PHOS 3.1 4.1   ALBUMIN 2.4* 2.8*   GLOBULIN 3.5 4.0*   ALKPHOS 65 80   ALT 7 8   AST 15 20   BILITOT 0.6 0.8         MICROBIOLOGY     Microbiology Results (last 7 days)       Procedure Component Value Units Date/Time    Respiratory Culture [[id number]]     Order Status: Sent Specimen: Sputum, Expectorated     Blood Culture [[id number]]  (Normal) Collected: 03/18/24 2226    Order Status: Completed Specimen: Blood, Venous Updated: 03/23/24 2300     CULTURE, BLOOD ([facility]) No Growth at 5 days    Blood culture #2 **CANNOT BE ORDERED STAT** [[id number]]  (Normal) Collected: 03/17/24 2103    Order Status: Completed Specimen: Blood Updated: 03/22/24 2200     CULTURE, BLOOD ([facility]) No Growth at 5 days    Blood culture #1 **CANNOT BE ORDERED STAT** [[id number]]  (Normal) Collected: 03/17/24 2103    Order Status: Completed Specimen: Blood Updated: 03/22/24 2200     CULTURE, BLOOD ([facility]) No Growth at 5 days              DIAGNOSTICS   X-Ray Chest 1 View  Narrative: Single view of the chest shows patchy bilateral alveolar opacities, increased from the prior study.  No pneumothorax.  Trace left pleural fluid is present.  Heart is enlarged.  Aorta is partially calcified.  Impression:   New patchy alveolar opacities in both lungs suggestive of atypical infection/edema  Findings are compatible with the preliminary report.  Electronically signed by: David Lipscomb MD  Date:    03/29/2024  Time:    09:47        ASSESSMENT   Scrotal abscess, with component of early Claudine's, s/p exploration, I&D, and left orchiectomy 03/18.  Severe sepsis from above, resolving  Questionable RLL post-viral Pneumonia, Human Metapneumovirus positive,  resolving  Acute hypoxic respiratory failure from above  Cardiac arrest with ventricular tachycardia requiring defibrillation x3 (03/18/2024)  AFib RVR  NSTMI, unspecified  Partial bowel obstruction  Normocytic anemia.    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  Mild ELIZABETH, resolved    PLAN   Continue diuresis with 40 IV Lasix daily  Continue BiPAP though will attempt to wean off over next couple of hours with further diuresis  Monitor I&Os, renal function and electrolytes  Continue antibiotics with oral Augmentin to complete antibiotic course  Continue ASA, FD Lovenox and statin  Monitoring H&H  Continue PPI BID (1 month, then daily), and Carafate (7 days total)  Amiodarone per Cardiology  Otherwise continue current management and monitoring in the interim      Prophylaxis: FD Lovenox        Jori Santos MD  Hospital Medicine      Critical Care Time: 35 minutes spent in direct hands on care, review of labs, imaging and medical record, and discussion of diagnosis, treatment, prognosis with patient/family.  Patient remains at high-risk for clinical decompensation  Critical Care diagnosis: Acute hypoxic respiratory failure requiring NIPPV

## 2024-03-29 NOTE — CARE UPDATE
Notified patient in respiratory distress.  Stat chest x-ray and ABG ordered.  Evaluated patient at bedside.  Patient is awake and in acute distress, he was tachypneic in the lower 40s, he was diffuse end-expiratory wheezing but is moving air bilaterally though not well.  He has accessory muscle use.  He was alert to self and situation.  Abdomen is soft.      -stat DuoNeb ordered, Solu-Medrol 125 mg x 1, place on BiPAP  -12 lead EKG at bedside showed no ST elevation  -chest x-ray shows a right middle lobe infiltrate consistent with pneumonia  -respiratory PCR, sputum culture, MRSA PCR, blood cultures ordered  -start Zosyn/Levaquin add vanc if MRSA PCR positive  -IV dose Lasix x1 in case of volume overload   -work of breathing improving at bedside with BiPAP, abg in 1 hr      Harris Hernandez MD   Critical care time 35 minutes; critical care DX: Respiratory failure/distress

## 2024-03-29 NOTE — NURSING
Spoke to Elizabeth Foster NP with urology in regards to booth cath- recommend keeping in until wound is further healed.

## 2024-03-29 NOTE — PROGRESS NOTES
"  [facility]    Cardiology  Progress Note    Patient Name: Ran Reyes Jr.  MRN: [id number]  Admission Date: 3/17/2024  Hospital Length of Stay: 11 days  Code Status: Full Code   Attending Physician: Jori Santos MD   Primary Care Physician: Abiodun Recinos DO  Expected Discharge Date:   Principal Problem:Scrotal hematoma    Subjective:   Chief Complaint/Reason for Consult: OAC recs     HPI: Ran Reyes Jr. Is a 68 year old male, known to Dr. Wagner, with PMH of  cardiomyopathy, systolic heart failure with an EF of 41%, VHD, chronic AFib on Xarelto, peripheral arterial disease, COPD, HTN, and a large left testicle hydrocele who presented to the ED 3/18/24 for scrotal bleeding with testicular swelling and pain. Found to have sepsis likely due to UTI, acute bronchitis. Also found in ED to be in Afib RVR on EKG, bp 90s/60s. BNP 1273, troponin 0.045 --> 0.055. Patient admits to dyspnea, cough, and wheezing. Denies chest pains. Cardiology being consulted for OAC recommendations.    Hospital Course:  3.19.24: Intubated/Mechanical Ventilation. AF SVR. On Vasopressor Support. Family at bedside.   3.20.24: NAD. Vented/Sedated. PAF/CVR. Dobutamine 2.5mcg/kg/min. Amiodarone 0.5mg/min.   3.21.24: NAD. Extubated. "I am ok." PAF/CVR. Levophed 0.01mcg/kg/min. Oral Amiodarone.   3.22.24: NAD. "I am feeling better." PAF/SVR but Mostly CVR. Off Pressors.   3.23.24: NAD. "I am good." PAF/SVR - Mostly CVR. No Profound Bradycardia since Yesterday. Remains off Pressors.   3.24.24: NAD. "I am fine." PAF/SVR, Mostly CVR in 60s, PVCs. H&H 7.3/23.3 - Transfusion of PRBCs.  3.25.24: NAD. "I am ok." PAF/SVR. Mostly CVR 60s-70s. H&H 8.7/26.2; GI Clear for DAPT/AC  3.26.24: NAD. Sitting in Bedside Chair. "I am feeling pretty good." H&H 8.5/26.8 s/p 1 Unit PRBCS on 3.25.24. Simple Face Mask. Denies CP, SOB and Palps.   3.27.24: NAD. Denies CP, SOB, or palps. Right wrist benign. "I feel good." H&H stable. On 5 L face mask. " "  3.28.24: NAD. Denies Cp, SOB, or palps. SR on tele. BP stable. On 3 L NC. "I am okay."   3.29.24: NAD. Currently on Bipap. SR on tele. Episode of hypoxia overnight. Started on BIPAP. CXR demonstrating right middle lobe infiltrate consistent with PNA. Started on antibiotics and IV antibiotics.      PMH: Cardiomyopathy, systolic heart failure with an EF of 41%, VHD, chronic AFib on Xarelto, peripheral arterial disease, COPD, HTN, CAD (Details Unclear)  PSH: cardiac stent >10 years ago, L FA atherectomy and angioplasty, R SFA stent, cataract extraction,   Family History: Father MI at age 66.   Social History: 10+ pack year hx current smoker, social EtOH, denies drugs.     Previous Cardiac Diagnostics:   TTE (3.29.24):  Left Ventricle: The left ventricle is normal in size. Moderately increased wall thickness. There is low normal systolic function with a visually estimated ejection fraction of 50 - 55%. There is diastolic dysfunction.  Mitral Valve: There is moderate regurgitation.     LHC (3.26.24):  Coronary findings:  Dominance: right   Left main:  10-20% calcified distal left main disease  Left anterior descending artery:  50% calcified proximal stenosis  Circumflex artery:  Luminal irregularities  Right coronary artery:  Luminal irregularities    Echocardiogram (3.19.24):  Left Ventricle: The left ventricle is normal in size. Increased wall thickness. Unable to assess wall motion. There is moderately reduced systolic function with a visually estimated ejection fraction of 30 - 40%.  Right Ventricle: Mild right ventricular enlargement.  Left Atrium: Left atrium is moderately dilated.  Right Atrium: Right atrium is severely dilated.  Mitral Valve: There is moderate regurgitation.  Tricuspid Valve: There is mild to moderate regurgitation.  Pulmonary Artery: Pulmonary artery pressure could not be estimated.  IVC/SVC: Patient is ventilated, cannot use IVC diameter to estimate right atrial pressure.    Echocardiogram " (1.31.24):   Left Ventricle: The left ventricle is normal in size. Mildly increased wall thickness. There is reduced systolic function. Biplane (2D) method of discs ejection fraction is 41%. Unable to assess diastolic function due to atrial fibrillation.  Right Ventricle: Normal right ventricular cavity size. Systolic function is mildly reduced.  Left Atrium: Left atrium is severely dilated.  Right Atrium: Right atrium is severely dilated.  Aortic Valve: The aortic valve is a trileaflet valve.  Mitral Valve: There is bileaflet sclerosis. There is moderate to severe regurgitation.  Tricuspid Valve: There is mild regurgitation.  IVC/SVC: Normal venous pressure at 3 mmHg.    Carotid US (2.7.23):  The study quality is average.   1-39% stenosis in the proximal right internal carotid artery based on Bluth Criteria. Antegrade right vertebral artery flow.   1-39% stenosis in the proximal left internal carotid artery based on Bluth Criteria. Antegrade left vertebral artery flow.     Echocardiogram (11.8.22):  The study quality is average.   The left ventricle is moderately enlarged. Left ventricular diastolic dimension is 6.4 cms. Global left ventricular systolic function is moderately decreased. The left ventricular ejection fraction is 40%. Arrhythmia noted throughout much of the exam.   There is no evidence of mitral stenosis or prolapse appreciated. MV Ortiz score ~ 5. The area by planimetry is 5.3 cm². The mean trans mitral gradient is 1.6 mmHg. Suspect borderline severe (4+) mitral regurgitation with a posteriorly directed jet. MR PISA radius ~ 0.93 cm, MR ERO ~ 0.35 cm^2, MR regurgitant volume ~ 72 ml/beat, MR regurgitant fraction ~ 55%, MR vena contracta ~ 0.54 cm.  Mild calcification of the aortic valve is noted with adequate cuspal excursion.   Trace pulmonic regurgitation. Mild (1+) tricuspid regurgitation.    Peripheral Angiogram (10.17.22):  Underwent Successful CSI Atherectomy Balloon Angioplasty and Stenting  of the Right SFA and CSI Atherectomy Balloon Angioplasty of Right Anterior Tibial Artery Using Pedal Approach.    Peripheral Angiogram (9.12.22):  Underwent Successful Left SFA Laser Atherectomy Balloon Angioplasty Using Left Radial Artery Approach.    Review of Systems   Constitutional: Positive for malaise/fatigue. Negative for fever.   Cardiovascular:  Negative for chest pain and leg swelling.   Respiratory:  Positive for shortness of breath.    Skin:         Scrotal Wound   All other systems reviewed and are negative.    Objective:     Vital Signs (Most Recent):  Temp: 97.6 °F (36.4 °C) (03/29/24 1507)  Pulse: 64 (03/29/24 1638)  Resp: (!) 21 (03/29/24 1638)  BP: 102/68 (03/29/24 1507)  SpO2: 99 % (03/29/24 1638) Vital Signs (24h Range):  Temp:  [97.4 °F (36.3 °C)-98.8 °F (37.1 °C)] 97.6 °F (36.4 °C)  Pulse:  [] 64  Resp:  [18-25] 21  SpO2:  [85 %-99 %] 99 %  BP: (102-158)/(66-88) 102/68   Weight: 95 kg (209 lb 7 oz)  Body mass index is 29.21 kg/m².  SpO2: 99 %       Intake/Output Summary (Last 24 hours) at 3/29/2024 1748  Last data filed at 3/29/2024 1721  Gross per 24 hour   Intake 450 ml   Output 6125 ml   Net -5675 ml       Lines/Drains/Airways       Drain  Duration                  Urethral Catheter 03/18/24 1426 Straight-tip 16 Fr. 11 days              Peripheral Intravenous Line  Duration                  Peripheral IV - Single Lumen 03/24/24 1634 18 G Anterior;Left Forearm 5 days                  Significant Labs:   Recent Results (from the past 72 hour(s))   Triglycerides    Collection Time: 03/27/24  4:22 AM   Result Value Ref Range    Triglyceride 60 34 - 140 mg/dL   Comprehensive metabolic panel    Collection Time: 03/27/24  4:22 AM   Result Value Ref Range    Sodium Level 140 136 - 145 mmol/L    Potassium Level 3.7 3.5 - 5.1 mmol/L    Chloride 106 98 - 107 mmol/L    Carbon Dioxide 24 23 - 31 mmol/L    Glucose Level 92 82 - 115 mg/dL    Blood Urea Nitrogen 9.7 8.4 - 25.7 mg/dL    Creatinine  1.05 0.73 - 1.18 mg/dL    Calcium Level Total 8.7 (L) 8.8 - 10.0 mg/dL    Protein Total 6.5 5.8 - 7.6 gm/dL    Albumin Level 2.4 (L) 3.4 - 4.8 g/dL    Globulin 4.1 (H) 2.4 - 3.5 gm/dL    Albumin/Globulin Ratio 0.6 (L) 1.1 - 2.0 ratio    Bilirubin Total 0.8 <=1.5 mg/dL    Alkaline Phosphatase 68 40 - 150 unit/L    Alanine Aminotransferase 9 0 - 55 unit/L    Aspartate Aminotransferase 18 5 - 34 unit/L    eGFR >60 mls/min/1.73/m2   Magnesium    Collection Time: 03/27/24  4:22 AM   Result Value Ref Range    Magnesium Level 2.00 1.60 - 2.60 mg/dL   Phosphorus    Collection Time: 03/27/24  4:22 AM   Result Value Ref Range    Phosphorus Level 2.9 2.3 - 4.7 mg/dL   CBC with Differential    Collection Time: 03/27/24  4:22 AM   Result Value Ref Range    WBC 7.81 4.50 - 11.50 x10(3)/mcL    RBC 2.78 (L) 4.70 - 6.10 x10(6)/mcL    Hgb 8.5 (L) 14.0 - 18.0 g/dL    Hct 26.6 (L) 42.0 - 52.0 %    MCV 95.7 (H) 80.0 - 94.0 fL    MCH 30.6 27.0 - 31.0 pg    MCHC 32.0 (L) 33.0 - 36.0 g/dL    RDW 16.9 11.5 - 17.0 %    Platelet 397 130 - 400 x10(3)/mcL    MPV 9.6 7.4 - 10.4 fL    Neut % 69.2 %    Lymph % 20.5 %    Mono % 7.6 %    Eos % 1.3 %    Basophil % 0.5 %    Lymph # 1.60 0.6 - 4.6 x10(3)/mcL    Neut # 5.41 2.1 - 9.2 x10(3)/mcL    Mono # 0.59 0.1 - 1.3 x10(3)/mcL    Eos # 0.10 0 - 0.9 x10(3)/mcL    Baso # 0.04 <=0.2 x10(3)/mcL    IG# 0.07 (H) 0 - 0.04 x10(3)/mcL    IG% 0.9 %    NRBC% 0.0 %   EKG 12-lead    Collection Time: 03/27/24  5:49 AM   Result Value Ref Range    QRS Duration 94 ms    [facility] QTC Calculation 508 ms   Echo Saline Bubble? No    Collection Time: 03/27/24 10:57 AM   Result Value Ref Range    BSA 2.12 m2   Triglycerides    Collection Time: 03/28/24  3:52 AM   Result Value Ref Range    Triglyceride 57 34 - 140 mg/dL   Comprehensive metabolic panel    Collection Time: 03/28/24  3:52 AM   Result Value Ref Range    Sodium Level 141 136 - 145 mmol/L    Potassium Level 4.2 3.5 - 5.1 mmol/L    Chloride 107 98 - 107 mmol/L     Carbon Dioxide 26 23 - 31 mmol/L    Glucose Level 87 82 - 115 mg/dL    Blood Urea Nitrogen 10.1 8.4 - 25.7 mg/dL    Creatinine 0.93 0.73 - 1.18 mg/dL    Calcium Level Total 8.3 (L) 8.8 - 10.0 mg/dL    Protein Total 5.9 5.8 - 7.6 gm/dL    Albumin Level 2.4 (L) 3.4 - 4.8 g/dL    Globulin 3.5 2.4 - 3.5 gm/dL    Albumin/Globulin Ratio 0.7 (L) 1.1 - 2.0 ratio    Bilirubin Total 0.6 <=1.5 mg/dL    Alkaline Phosphatase 65 40 - 150 unit/L    Alanine Aminotransferase 7 0 - 55 unit/L    Aspartate Aminotransferase 15 5 - 34 unit/L    eGFR >60 mls/min/1.73/m2   Magnesium    Collection Time: 03/28/24  3:52 AM   Result Value Ref Range    Magnesium Level 2.00 1.60 - 2.60 mg/dL   Phosphorus    Collection Time: 03/28/24  3:52 AM   Result Value Ref Range    Phosphorus Level 3.1 2.3 - 4.7 mg/dL   CBC with Differential    Collection Time: 03/28/24  3:52 AM   Result Value Ref Range    WBC 8.06 4.50 - 11.50 x10(3)/mcL    RBC 2.71 (L) 4.70 - 6.10 x10(6)/mcL    Hgb 8.4 (L) 14.0 - 18.0 g/dL    Hct 26.9 (L) 42.0 - 52.0 %    MCV 99.3 (H) 80.0 - 94.0 fL    MCH 31.0 27.0 - 31.0 pg    MCHC 31.2 (L) 33.0 - 36.0 g/dL    RDW 17.2 (H) 11.5 - 17.0 %    Platelet 380 130 - 400 x10(3)/mcL    MPV 10.1 7.4 - 10.4 fL    NRBC% 0.0 %   Manual Differential    Collection Time: 03/28/24  3:52 AM   Result Value Ref Range    WBC 8.06 x10(3)/mcL    Neutrophils % 76 %    Lymphs % 17 %    Monocytes % 7 %    nRBC % 1 %    Neutrophils Abs 6.1256 2.1 - 9.2 x10(3)/mcL    Lymphs Abs 1.3702 0.6 - 4.6 x10(3)/mcL    Monocytes Abs 0.5642 0.1 - 1.3 x10(3)/mcL    Platelets Normal Normal, Adequate    RBC Morph Abnormal (A) Normal    Poikilocytosis 1+ (A) (none)    Anisocytosis 1+ (A) (none)   Echo    Collection Time: 03/28/24 11:43 AM   Result Value Ref Range    BSA 2.12 m2    Quintana's Biplane MOD Ejection Fraction 46 %    LVIDd 5.35 3.5 - 6.0 cm    LV Systolic Volume 91.00 mL    LV Systolic Volume Index 42.3 mL/m2    LVIDs 4.47 (A) 2.1 - 4.0 cm    LV Diastolic Volume 138.00  mL    LV Diastolic Volume Index 64.19 mL/m2    IVS 1.81 (A) 0.6 - 1.1 cm    FS 16 (A) 28 - 44 %    Left Ventricle Relative Wall Thickness 0.59 cm    Posterior Wall 1.58 (A) 0.6 - 1.1 cm    LV mass 428.66 g    LV Mass Index 199 g/m2    RVDD 3.07 cm    Vn Nyquist MS 0.35 m/s    Radius 1.20 cm    Vn Nyquist 0.35 m/s    Mr max trevon 3.48 m/s    MR ANUJ EROA 0.91 cm2    ZLVIDS 0.40     ZLVIDD -2.66    RT Blood Gas    Collection Time: 03/28/24 11:00 PM   Result Value Ref Range    Sample Type Arterial Blood     Sample site Right Radial Artery     Drawn by rcl crt     pH, Blood gas 7.420 7.350 - 7.450    pCO2, Blood gas 40.0 35.0 - 45.0 mmHg    pO2, Blood gas 51.0 (LL) 80.0 - 100.0 mmHg    Sodium, Blood Gas 136 (L) 137 - 145 mmol/L    Potassium, Blood Gas 4.5 3.5 - 5.0 mmol/L    Calcium Level Ionized 1.16 1.12 - 1.23 mmol/L    TOC2, Blood gas 27.1 mmol/L    Base Excess, Blood gas 1.30 -2.00 - 2.00 mmol/L    sO2, Blood gas 86.4 %    HCO3, Blood gas 25.9 22.0 - 26.0 mmol/L    THb, Blood gas 9.4 (L) 12 - 16 g/dL    O2 Hb, Blood Gas 84.0 (L) 94.0 - 97.0 %    CO Hgb 2.0 (H) 0.5 - 1.5 %    Met Hgb 0.8 0.4 - 1.5 %    Allens Test Yes     Oxygen Device, Blood gas Oxy Mask     LPM 2    Comprehensive metabolic panel    Collection Time: 03/29/24 12:40 AM   Result Value Ref Range    Sodium Level 142 136 - 145 mmol/L    Potassium Level 4.7 3.5 - 5.1 mmol/L    Chloride 106 98 - 107 mmol/L    Carbon Dioxide 24 23 - 31 mmol/L    Glucose Level 106 82 - 115 mg/dL    Blood Urea Nitrogen 14.9 8.4 - 25.7 mg/dL    Creatinine 1.03 0.73 - 1.18 mg/dL    Calcium Level Total 8.9 8.8 - 10.0 mg/dL    Protein Total 6.8 5.8 - 7.6 gm/dL    Albumin Level 2.8 (L) 3.4 - 4.8 g/dL    Globulin 4.0 (H) 2.4 - 3.5 gm/dL    Albumin/Globulin Ratio 0.7 (L) 1.1 - 2.0 ratio    Bilirubin Total 0.8 <=1.5 mg/dL    Alkaline Phosphatase 80 40 - 150 unit/L    Alanine Aminotransferase 8 0 - 55 unit/L    Aspartate Aminotransferase 20 5 - 34 unit/L    eGFR >60 mls/min/1.73/m2    Magnesium    Collection Time: 03/29/24 12:40 AM   Result Value Ref Range    Magnesium Level 2.00 1.60 - 2.60 mg/dL   Phosphorus    Collection Time: 03/29/24 12:40 AM   Result Value Ref Range    Phosphorus Level 4.1 2.3 - 4.7 mg/dL   Troponin I    Collection Time: 03/29/24 12:40 AM   Result Value Ref Range    Troponin-I 0.028 0.000 - 0.045 ng/mL   CBC with Differential    Collection Time: 03/29/24 12:40 AM   Result Value Ref Range    WBC 10.55 4.50 - 11.50 x10(3)/mcL    RBC 3.02 (L) 4.70 - 6.10 x10(6)/mcL    Hgb 9.2 (L) 14.0 - 18.0 g/dL    Hct 29.5 (L) 42.0 - 52.0 %    MCV 97.7 (H) 80.0 - 94.0 fL    MCH 30.5 27.0 - 31.0 pg    MCHC 31.2 (L) 33.0 - 36.0 g/dL    RDW 17.2 (H) 11.5 - 17.0 %    Platelet 380 130 - 400 x10(3)/mcL    MPV 10.0 7.4 - 10.4 fL    Neut % 82.1 %    Lymph % 10.0 %    Mono % 6.3 %    Eos % 0.5 %    Basophil % 0.5 %    Lymph # 1.05 0.6 - 4.6 x10(3)/mcL    Neut # 8.68 2.1 - 9.2 x10(3)/mcL    Mono # 0.66 0.1 - 1.3 x10(3)/mcL    Eos # 0.05 0 - 0.9 x10(3)/mcL    Baso # 0.05 <=0.2 x10(3)/mcL    IG# 0.06 (H) 0 - 0.04 x10(3)/mcL    IG% 0.6 %    NRBC% 0.0 %   RT Blood Gas    Collection Time: 03/29/24  1:25 AM   Result Value Ref Range    Sample Type Arterial Blood     Sample site Right Radial Artery     Drawn by rcl crt     pH, Blood gas 7.430 7.350 - 7.450    pCO2, Blood gas 38.0 35.0 - 45.0 mmHg    pO2, Blood gas 65.0 (L) 80.0 - 100.0 mmHg    Sodium, Blood Gas 137 137 - 145 mmol/L    Potassium, Blood Gas 4.0 3.5 - 5.0 mmol/L    Calcium Level Ionized 1.13 1.12 - 1.23 mmol/L    TOC2, Blood gas 26.4 mmol/L    Base Excess, Blood gas 0.90 -2.00 - 2.00 mmol/L    sO2, Blood gas 93.0 %    HCO3, Blood gas 25.2 22.0 - 26.0 mmol/L    THb, Blood gas 9.3 (L) 12 - 16 g/dL    O2 Hb, Blood Gas 91.9 (L) 94.0 - 97.0 %    CO Hgb 1.9 (H) 0.5 - 1.5 %    Met Hgb 0.6 0.4 - 1.5 %    Allens Test Yes     MODE BiPAP     FIO2, Blood gas 40 %    BiPAP (I) 12 cm H2O    BiPAP (E) 5 cm H2O   Respiratory Panel    Collection Time: 03/29/24   1:26 AM   Result Value Ref Range    Adenovirus Not Detected Not Detected    Coronavirus 229E Not Detected Not Detected    Coronavirus HKU1 Not Detected Not Detected    Coronavirus NL63 Not Detected Not Detected    Coronavirus OC43 PCR, Common Cold Virus Not Detected Not Detected    Human Metapneumovirus Not Detected Not Detected    Parainfluenza Virus 1 Not Detected Not Detected    Parainfluenza Virus 2 Not Detected Not Detected    Parainfluenza Virus 3 Not Detected Not Detected    Parainfluenza Virus 4 Not Detected Not Detected    Bordetella pertussis (ptxP) Not Detected Not Detected    Chlamydia pneumoniae Not Detected Not Detected    Mycoplasma pneumoniae Not Detected Not Detected    Human Rhinovirus/Enterovirus Not Detected Not Detected    Bordetella parapertussis (QB4274) Not Detected Not Detected   MRSA PCR    Collection Time: 03/29/24  1:26 AM   Result Value Ref Range    MRSA PCR SCRN ([facility]) Not Detected Not Detected   Triglycerides    Collection Time: 03/29/24  5:02 AM   Result Value Ref Range    Triglyceride 46 34 - 140 mg/dL     Telemetry: PAF/CVR    Physical Exam  Vitals reviewed.   Constitutional:       General: He is not in acute distress.     Appearance: Normal appearance. He is ill-appearing.   HENT:      Head: Normocephalic.      Mouth/Throat:      Mouth: Mucous membranes are moist.   Eyes:      Conjunctiva/sclera: Conjunctivae normal.   Cardiovascular:      Rate and Rhythm: Normal rate. Rhythm irregular.      Heart sounds: Murmur heard.      Comments: Right wrist soft, nontender. No hematoma noted. + 2 peripheral pulse  Pulmonary:      Effort: Pulmonary effort is normal. No respiratory distress.      Breath sounds: Decreased breath sounds and rales present.      Comments: Bipap   Abdominal:      Palpations: Abdomen is soft.   Musculoskeletal:         General: No swelling.      Right lower leg: No edema.      Left lower leg: No edema.   Neurological:      General: No focal deficit present.       Mental Status: He is alert and oriented to person, place, and time.   Psychiatric:         Mood and Affect: Mood normal.         Behavior: Behavior normal.         Judgment: Judgment normal.       Current Inpatient Medications:    Current Facility-Administered Medications:     0.9%  NaCl infusion (for blood administration), , Intravenous, Q24H PRN, Carmen Griggs FNP    0.9%  NaCl infusion (for blood administration), , Intravenous, Q24H PRN, Joshua Hernandez ANP    acetaminophen tablet 650 mg, 650 mg, Oral, Q8H PRN, Harris Hernandez MD    albuterol-ipratropium 2.5 mg-0.5 mg/3 mL nebulizer solution 3 mL, 3 mL, Nebulization, Q4H, Natali Seymour MD, 3 mL at 03/29/24 1638    [START ON 3/30/2024] amiodarone tablet 200 mg, 200 mg, Oral, BID, Davida Mo FNP    [START ON 4/3/2024] amiodarone tablet 200 mg, 200 mg, Oral, Daily, Davida Mo FNP    amiodarone tablet 400 mg, 400 mg, Oral, BID, Davida Mo FNP, 400 mg at 03/29/24 1034    aspirin EC tablet 81 mg, 81 mg, Oral, Daily, Jori Santos MD, 81 mg at 03/29/24 1033    atorvastatin tablet 80 mg, 80 mg, Oral, Daily, Cassidy Obrien MD, 80 mg at 03/29/24 1034    chlorhexidine 0.12 % solution 15 mL, 15 mL, Mouth/Throat, BID, David Gonsalez MD, 15 mL at 03/28/24 2114    dextrose 10 % infusion, , Intravenous, PRN, David Gonsalez MD    dextrose 10 % infusion, , Intravenous, PRN, David Gonsalez MD    dextrose 10% bolus 125 mL 125 mL, 12.5 g, Intravenous, PRN, David Gonsalez MD    dextrose 10% bolus 250 mL 250 mL, 25 g, Intravenous, PRN, David Gonsalez MD    doxycycline tablet 100 mg, 100 mg, Oral, Q12H, Jori Santos MD, 100 mg at 03/29/24 1208    enoxaparin injection 90 mg, 1 mg/kg, Subcutaneous, Q12H (prophylaxis, 0900/2100), Natali Seymour MD, 90 mg at 03/28/24 2114    folic acid tablet 1 mg, 1 mg, Oral, Daily, Jenna Miller MD, 1 mg at 03/29/24 1034    furosemide injection 40 mg, 40 mg, Intravenous, Daily, Jori Santos  MD KRYSTEN, 40 mg at 03/29/24 1207    hydrALAZINE injection 10 mg, 10 mg, Intravenous, Q4H PRN, Adriano Montiel MD    insulin aspart U-100 injection 0-10 Units, 0-10 Units, Subcutaneous, Q6H PRN, Narendra Pradhan DO    ipratropium 0.02 % nebulizer solution 0.5 mg, 0.5 mg, Nebulization, Q6H PRN, David Gonsalez MD    LORazepam tablet 2 mg, 2 mg, Oral, Q4H PRN, Jenna Miller MD, 2 mg at 03/28/24 2212    melatonin tablet 6 mg, 6 mg, Oral, Nightly PRN, Harris Hernandez MD, 6 mg at 03/28/24 2114    methylPREDNISolone sodium succinate injection 80 mg, 80 mg, Intravenous, Q12H, Harris Hernandez MD, 80 mg at 03/29/24 0620    midodrine tablet 10 mg, 10 mg, Oral, TID WM, Narendra Pradhan DO, 10 mg at 03/29/24 1646    morphine injection 2 mg, 2 mg, Intravenous, Q6H PRN, David Gonsalez MD, 2 mg at 03/29/24 0959    multivitamin tablet, 1 tablet, Oral, Daily, Jenna Miller MD, 1 tablet at 03/29/24 1034    ondansetron injection 4 mg, 4 mg, Intravenous, Q8H PRN, Adriano Montiel MD    oxyCODONE immediate release tablet 5 mg, 5 mg, Oral, Q4H PRN, Harris Hernandez MD, 5 mg at 03/29/24 1033    pantoprazole injection 40 mg, 40 mg, Intravenous, BID, Cassidy Obrien MD, 40 mg at 03/28/24 2156    phenazopyridine tablet 100 mg, 100 mg, Oral, TID WM, Jori Santos MD, 100 mg at 03/29/24 1646    piperacillin-tazobactam (ZOSYN) 4.5 g in dextrose 5 % in water (D5W) 100 mL IVPB (MB+), 4.5 g, Intravenous, Q8H, Jori Santos MD, Stopped at 03/29/24 1359    sodium chloride 0.9% flush 10 mL, 10 mL, Intravenous, PRN, Narendra Pradhan DO, 10 mL at 03/27/24 2028    sodium chloride 0.9% flush 10 mL, 10 mL, Intravenous, PRN, David Gonsalez MD    Facility-Administered Medications Ordered in Other Encounters:     0.9%  NaCl infusion, , Intravenous, Continuous, Shannon Milligan MD, Last Rate: 10 mL/hr at 03/09/23 1020, New Bag at 03/09/23 1020    diphenhydrAMINE injection 25 mg, 25 mg, Intravenous, Once PRN,  Shannon Milligan MD    LIDOcaine (PF) 10 mg/ml (1%) injection 10 mg, 1 mL, Intradermal, Once, Lanette Ulloa FNP    LIDOcaine (PF) 10 mg/ml (1%) injection 10 mg, 1 mL, Intradermal, Once, Shannon Milligan MD    ondansetron injection 4 mg, 4 mg, Intravenous, Once, Shannon Milligan MD    prochlorperazine injection Soln 5 mg, 5 mg, Intravenous, Once PRN, Shannon Milligan MD  VTE Risk Mitigation (From admission, onward)           Ordered     enoxaparin injection 90 mg  Every 12 hours         03/24/24 0901     IP VTE LOW RISK PATIENT  Once         03/19/24 0422     Place sequential compression device  Until discontinued         03/18/24 0124                  Assessment:   Cardiac Arrest/VT (Post Operative Left Héctor Orchiectomy - in PACU 3.18.24)     - Requiring Multiple Defibrillations (x 3)  NSTEMI Type 2 in the Setting of Cardiac Arrest, Anemia  Anemia - Stable    - Cleared by GI for DAPT/AC    - Requiring Transfusion    - EGD (3.22.24) - LA Grade B Reflux Esophagitis with No Bleeding, Chronic Gastritis, Protonix; Colonoscopy as OP 2/2 Scrotal Wound  Chronic Atrial Fibrillation - Now AF CVR (HR 50's-60s with Intermittent PVCS)    - Xarelto on Hold Post Scrotal Abscess I&D (On FD Lovenox)  Non Ischemic Cardiomyopathy     - EF 30-40%  CAD (Details Unclear)  Acute Hypoxemic Respiratory Failure requiring Intubation/Ventilation - Now Extubated on Supplemental O2  Hypotension Requiring Vasopressor Support - Resolved     - History of Hypertension  Valvular Heart Disease    - MR: Moderate, TR: Mild to Moderate  Hyperlipidemia    - On Statin  SIRS - Likely UTI Related - Resolved     - BC x 2 Negative at 5 Days  Scrotal Abscess    - Status Post Surgical Exploration, Left Orchiectomy, & Debridement of Wound/Wound Packing (3.18.24)   COPD  Partial Bowel Obstruction - Resolved   Long Term Oral Anticoagulation  PAD  Infrarenal Abdominal Aortic Aneurysmal Dilatation with Mural Thrombus (Stable)    - Maximum  diameter is 3.6 cm without significant interval change   Acute Human Metapneumovirus Infection (On Droplet Precautions) - Off Precautions    Plan:   Continue PO amio load.   GI Cleared PT for DAPT/AC. Continue ASA 81 mg daily. Continue FD lovenox for CVA prophylaxis in the setting of PAF. Resume Xarelto prior to discharge.   Keep K > 4.0 and Mg > 2.0   Continue IV lasix 40 mg IVP daily.  Ensure accurate I&O's and daily weights.   Antibiotic Management/Wound Care as per Primary Team  Start low dose BB if BP can tolerate tomorrow.   ECHO reviewed. Does not qualify for lifevest.   Labs in AM: CBC, CMP and Mg    MALI Solo  Cardiology  [facility]  03/29/2024       Physician addendum:  I have seen and examined this patient as a split-shared visit with the FRANKIE d/t complicated medical management of above problems written in assessment and high acuity requiring physician expertise in medical decision-making. I performed the substantive portion of the history and exam. Above medical decision-making is also formulated by me.    Cardiovascular exam:  S1, S2  Lungs:  fine crackles at bases.  Extremities:  1+ edema bilaterally    Plan:  Continue IV Lasix.  Start low-dose beta-blocker.  Follow up.      Valerio Lyn MD  Cardiologist

## 2024-03-30 LAB
ANION GAP SERPL CALC-SCNC: 12 MEQ/L
BASOPHILS # BLD AUTO: 0.01 X10(3)/MCL
BASOPHILS NFR BLD AUTO: 0.1 %
BUN SERPL-MCNC: 24.2 MG/DL (ref 8.4–25.7)
CALCIUM SERPL-MCNC: 9 MG/DL (ref 8.8–10)
CHLORIDE SERPL-SCNC: 104 MMOL/L (ref 98–107)
CO2 SERPL-SCNC: 24 MMOL/L (ref 23–31)
CREAT SERPL-MCNC: 1.02 MG/DL (ref 0.73–1.18)
CREAT/UREA NIT SERPL: 24
EOSINOPHIL # BLD AUTO: 0 X10(3)/MCL (ref 0–0.9)
EOSINOPHIL NFR BLD AUTO: 0 %
ERYTHROCYTE [DISTWIDTH] IN BLOOD BY AUTOMATED COUNT: 16.9 % (ref 11.5–17)
GFR SERPLBLD CREATININE-BSD FMLA CKD-EPI: >60 MLS/MIN/1.73/M2
GLUCOSE SERPL-MCNC: 166 MG/DL (ref 82–115)
HCT VFR BLD AUTO: 27.7 % (ref 42–52)
HGB BLD-MCNC: 8.9 G/DL (ref 14–18)
IMM GRANULOCYTES # BLD AUTO: 0.04 X10(3)/MCL (ref 0–0.04)
IMM GRANULOCYTES NFR BLD AUTO: 0.3 %
LYMPHOCYTES # BLD AUTO: 0.35 X10(3)/MCL (ref 0.6–4.6)
LYMPHOCYTES NFR BLD AUTO: 2.6 %
MAGNESIUM SERPL-MCNC: 2 MG/DL (ref 1.6–2.6)
MCH RBC QN AUTO: 30.6 PG (ref 27–31)
MCHC RBC AUTO-ENTMCNC: 32.1 G/DL (ref 33–36)
MCV RBC AUTO: 95.2 FL (ref 80–94)
MONOCYTES # BLD AUTO: 0.26 X10(3)/MCL (ref 0.1–1.3)
MONOCYTES NFR BLD AUTO: 1.9 %
NEUTROPHILS # BLD AUTO: 13.05 X10(3)/MCL (ref 2.1–9.2)
NEUTROPHILS NFR BLD AUTO: 95.1 %
NRBC BLD AUTO-RTO: 0 %
OHS QRS DURATION: 88 MS
OHS QTC CALCULATION: 450 MS
PLATELET # BLD AUTO: 339 X10(3)/MCL (ref 130–400)
PMV BLD AUTO: 10.3 FL (ref 7.4–10.4)
POTASSIUM SERPL-SCNC: 3.7 MMOL/L (ref 3.5–5.1)
RBC # BLD AUTO: 2.91 X10(6)/MCL (ref 4.7–6.1)
SODIUM SERPL-SCNC: 140 MMOL/L (ref 136–145)
TRIGL SERPL-MCNC: 41 MG/DL (ref 34–140)
WBC # SPEC AUTO: 13.71 X10(3)/MCL (ref 4.5–11.5)

## 2024-03-30 PROCEDURE — 99900031 HC PATIENT EDUCATION (STAT)

## 2024-03-30 PROCEDURE — 85025 COMPLETE CBC W/AUTO DIFF WBC: CPT | Performed by: INTERNAL MEDICINE

## 2024-03-30 PROCEDURE — 27000221 HC OXYGEN, UP TO 24 HOURS

## 2024-03-30 PROCEDURE — 99900035 HC TECH TIME PER 15 MIN (STAT)

## 2024-03-30 PROCEDURE — C9113 INJ PANTOPRAZOLE SODIUM, VIA: HCPCS | Performed by: INTERNAL MEDICINE

## 2024-03-30 PROCEDURE — 51702 INSERT TEMP BLADDER CATH: CPT

## 2024-03-30 PROCEDURE — 63600175 PHARM REV CODE 636 W HCPCS: Performed by: INTERNAL MEDICINE

## 2024-03-30 PROCEDURE — 25000003 PHARM REV CODE 250: Performed by: INTERNAL MEDICINE

## 2024-03-30 PROCEDURE — 83735 ASSAY OF MAGNESIUM: CPT | Performed by: INTERNAL MEDICINE

## 2024-03-30 PROCEDURE — 94640 AIRWAY INHALATION TREATMENT: CPT

## 2024-03-30 PROCEDURE — 25000003 PHARM REV CODE 250

## 2024-03-30 PROCEDURE — 80048 BASIC METABOLIC PNL TOTAL CA: CPT | Performed by: INTERNAL MEDICINE

## 2024-03-30 PROCEDURE — 94760 N-INVAS EAR/PLS OXIMETRY 1: CPT

## 2024-03-30 PROCEDURE — 84478 ASSAY OF TRIGLYCERIDES: CPT

## 2024-03-30 PROCEDURE — 25000003 PHARM REV CODE 250: Performed by: STUDENT IN AN ORGANIZED HEALTH CARE EDUCATION/TRAINING PROGRAM

## 2024-03-30 PROCEDURE — 25000242 PHARM REV CODE 250 ALT 637 W/ HCPCS: Performed by: INTERNAL MEDICINE

## 2024-03-30 PROCEDURE — 21400001 HC TELEMETRY ROOM

## 2024-03-30 RX ADMIN — PHENAZOPYRIDINE HYDROCHLORIDE 100 MG: 100 TABLET ORAL at 11:03

## 2024-03-30 RX ADMIN — ATORVASTATIN CALCIUM 80 MG: 40 TABLET, FILM COATED ORAL at 09:03

## 2024-03-30 RX ADMIN — PANTOPRAZOLE SODIUM 40 MG: 40 INJECTION, POWDER, FOR SOLUTION INTRAVENOUS at 09:03

## 2024-03-30 RX ADMIN — OXYCODONE HYDROCHLORIDE 5 MG: 5 TABLET ORAL at 06:03

## 2024-03-30 RX ADMIN — Medication 6 MG: at 08:03

## 2024-03-30 RX ADMIN — PIPERACILLIN SODIUM AND TAZOBACTAM SODIUM 4.5 G: 4; .5 INJECTION, POWDER, LYOPHILIZED, FOR SOLUTION INTRAVENOUS at 09:03

## 2024-03-30 RX ADMIN — METOPROLOL SUCCINATE 12.5 MG: 25 TABLET, EXTENDED RELEASE ORAL at 02:03

## 2024-03-30 RX ADMIN — FOLIC ACID 1 MG: 1 TABLET ORAL at 09:03

## 2024-03-30 RX ADMIN — IPRATROPIUM BROMIDE AND ALBUTEROL SULFATE 3 ML: 2.5; .5 SOLUTION RESPIRATORY (INHALATION) at 04:03

## 2024-03-30 RX ADMIN — ENOXAPARIN SODIUM 90 MG: 100 INJECTION SUBCUTANEOUS at 09:03

## 2024-03-30 RX ADMIN — DOXYCYCLINE HYCLATE 100 MG: 100 TABLET, COATED ORAL at 09:03

## 2024-03-30 RX ADMIN — IPRATROPIUM BROMIDE AND ALBUTEROL SULFATE 3 ML: 2.5; .5 SOLUTION RESPIRATORY (INHALATION) at 12:03

## 2024-03-30 RX ADMIN — ENOXAPARIN SODIUM 90 MG: 100 INJECTION SUBCUTANEOUS at 08:03

## 2024-03-30 RX ADMIN — MIDODRINE HYDROCHLORIDE 10 MG: 5 TABLET ORAL at 01:03

## 2024-03-30 RX ADMIN — METHYLPREDNISOLONE SODIUM SUCCINATE 80 MG: 40 INJECTION, POWDER, FOR SOLUTION INTRAMUSCULAR; INTRAVENOUS at 05:03

## 2024-03-30 RX ADMIN — PHENAZOPYRIDINE HYDROCHLORIDE 100 MG: 100 TABLET ORAL at 09:03

## 2024-03-30 RX ADMIN — PIPERACILLIN SODIUM AND TAZOBACTAM SODIUM 4.5 G: 4; .5 INJECTION, POWDER, LYOPHILIZED, FOR SOLUTION INTRAVENOUS at 01:03

## 2024-03-30 RX ADMIN — PHENAZOPYRIDINE HYDROCHLORIDE 100 MG: 100 TABLET ORAL at 05:03

## 2024-03-30 RX ADMIN — IPRATROPIUM BROMIDE AND ALBUTEROL SULFATE 3 ML: 2.5; .5 SOLUTION RESPIRATORY (INHALATION) at 08:03

## 2024-03-30 RX ADMIN — CHLORHEXIDINE GLUCONATE 0.12% ORAL RINSE 15 ML: 1.2 LIQUID ORAL at 09:03

## 2024-03-30 RX ADMIN — ASPIRIN 81 MG: 81 TABLET, COATED ORAL at 09:03

## 2024-03-30 RX ADMIN — PANTOPRAZOLE SODIUM 40 MG: 40 INJECTION, POWDER, FOR SOLUTION INTRAVENOUS at 08:03

## 2024-03-30 RX ADMIN — METHYLPREDNISOLONE SODIUM SUCCINATE 80 MG: 40 INJECTION, POWDER, FOR SOLUTION INTRAMUSCULAR; INTRAVENOUS at 06:03

## 2024-03-30 RX ADMIN — FUROSEMIDE 40 MG: 10 INJECTION, SOLUTION INTRAMUSCULAR; INTRAVENOUS at 09:03

## 2024-03-30 RX ADMIN — DOXYCYCLINE HYCLATE 100 MG: 100 TABLET, COATED ORAL at 08:03

## 2024-03-30 RX ADMIN — AMIODARONE HYDROCHLORIDE 400 MG: 200 TABLET ORAL at 09:03

## 2024-03-30 RX ADMIN — THERA TABS 1 TABLET: TAB at 09:03

## 2024-03-30 RX ADMIN — IPRATROPIUM BROMIDE AND ALBUTEROL SULFATE 3 ML: 2.5; .5 SOLUTION RESPIRATORY (INHALATION) at 11:03

## 2024-03-30 RX ADMIN — PIPERACILLIN SODIUM AND TAZOBACTAM SODIUM 4.5 G: 4; .5 INJECTION, POWDER, LYOPHILIZED, FOR SOLUTION INTRAVENOUS at 06:03

## 2024-03-30 RX ADMIN — IPRATROPIUM BROMIDE AND ALBUTEROL SULFATE 3 ML: 2.5; .5 SOLUTION RESPIRATORY (INHALATION) at 01:03

## 2024-03-30 RX ADMIN — MIDODRINE HYDROCHLORIDE 10 MG: 5 TABLET ORAL at 09:03

## 2024-03-30 RX ADMIN — MIDODRINE HYDROCHLORIDE 10 MG: 5 TABLET ORAL at 05:03

## 2024-03-30 RX ADMIN — AMIODARONE HYDROCHLORIDE 200 MG: 200 TABLET ORAL at 08:03

## 2024-03-30 RX ADMIN — CHLORHEXIDINE GLUCONATE 0.12% ORAL RINSE 15 ML: 1.2 LIQUID ORAL at 08:03

## 2024-03-30 RX ADMIN — PHENAZOPYRIDINE HYDROCHLORIDE 100 MG: 100 TABLET ORAL at 01:03

## 2024-03-30 NOTE — PROGRESS NOTES
"  [facility]    Cardiology  Progress Note    Patient Name: Ran Reyes Jr.  MRN: [id number]  Admission Date: 3/17/2024  Hospital Length of Stay: 12 days  Code Status: Full Code   Attending Physician: Jori Santos MD   Primary Care Physician: Abiodun Recinos DO  Expected Discharge Date:   Principal Problem:Scrotal hematoma    Subjective:   Chief Complaint/Reason for Consult: OAC recs     HPI: Ran Reyes Jr. Is a 68 year old male, known to Dr. Wagner, with PMH of  cardiomyopathy, systolic heart failure with an EF of 41%, VHD, chronic AFib on Xarelto, peripheral arterial disease, COPD, HTN, and a large left testicle hydrocele who presented to the ED 3/18/24 for scrotal bleeding with testicular swelling and pain. Found to have sepsis likely due to UTI, acute bronchitis. Also found in ED to be in Afib RVR on EKG, bp 90s/60s. BNP 1273, troponin 0.045 --> 0.055. Patient admits to dyspnea, cough, and wheezing. Denies chest pains. Cardiology being consulted for OAC recommendations.    Hospital Course:  3.19.24: Intubated/Mechanical Ventilation. AF SVR. On Vasopressor Support. Family at bedside.   3.20.24: NAD. Vented/Sedated. PAF/CVR. Dobutamine 2.5mcg/kg/min. Amiodarone 0.5mg/min.   3.21.24: NAD. Extubated. "I am ok." PAF/CVR. Levophed 0.01mcg/kg/min. Oral Amiodarone.   3.22.24: NAD. "I am feeling better." PAF/SVR but Mostly CVR. Off Pressors.   3.23.24: NAD. "I am good." PAF/SVR - Mostly CVR. No Profound Bradycardia since Yesterday. Remains off Pressors.   3.24.24: NAD. "I am fine." PAF/SVR, Mostly CVR in 60s, PVCs. H&H 7.3/23.3 - Transfusion of PRBCs.  3.25.24: NAD. "I am ok." PAF/SVR. Mostly CVR 60s-70s. H&H 8.7/26.2; GI Clear for DAPT/AC  3.26.24: NAD. Sitting in Bedside Chair. "I am feeling pretty good." H&H 8.5/26.8 s/p 1 Unit PRBCS on 3.25.24. Simple Face Mask. Denies CP, SOB and Palps.   3.27.24: NAD. Denies CP, SOB, or palps. Right wrist benign. "I feel good." H&H stable. On 5 L face mask. " "  3.28.24: NAD. Denies Cp, SOB, or palps. AF CVR on tele. BP stable. On 3 L NC. "I am okay."   3.29.24: NAD. Currently on Bipap. AF CVR on tele. Episode of hypoxia overnight. Started on BIPAP. CXR demonstrating right middle lobe infiltrate consistent with PNA. Started on antibiotics and IV antibiotics.   3.30.24: NAD. Reports feeling much better. Down to 2 L NC. Daily net negative 2544 mL/24 hours. AF CVR on tele. BP stable.      PMH: Cardiomyopathy, systolic heart failure with an EF of 41%, VHD, chronic AFib on Xarelto, peripheral arterial disease, COPD, HTN, CAD (Details Unclear)  PSH: cardiac stent >10 years ago, L FA atherectomy and angioplasty, R SFA stent, cataract extraction,   Family History: Father MI at age 66.   Social History: 10+ pack year hx current smoker, social EtOH, denies drugs.     Previous Cardiac Diagnostics:   TTE (3.28.24):  Left Ventricle: The left ventricle is normal in size. Moderately increased wall thickness. There is low normal systolic function with a visually estimated ejection fraction of 50 - 55%. There is diastolic dysfunction.  Mitral Valve: There is moderate regurgitation.    LHC (3.27.24):  Left Atrium: Agitated saline study of the atrial septum is faintly positive. Small amount of late bubbles crossing only with valsalva images. Could be via pulmonary transit. No clear atrial level shunt.      LHC (3.26.24):  Coronary findings:  Dominance: right   Left main:  10-20% calcified distal left main disease  Left anterior descending artery:  50% calcified proximal stenosis  Circumflex artery:  Luminal irregularities  Right coronary artery:  Luminal irregularities    Echocardiogram (3.19.24):  Left Ventricle: The left ventricle is normal in size. Increased wall thickness. Unable to assess wall motion. There is moderately reduced systolic function with a visually estimated ejection fraction of 30 - 40%.  Right Ventricle: Mild right ventricular enlargement.  Left Atrium: Left atrium is " moderately dilated.  Right Atrium: Right atrium is severely dilated.  Mitral Valve: There is moderate regurgitation.  Tricuspid Valve: There is mild to moderate regurgitation.  Pulmonary Artery: Pulmonary artery pressure could not be estimated.  IVC/SVC: Patient is ventilated, cannot use IVC diameter to estimate right atrial pressure.    Echocardiogram (1.31.24):   Left Ventricle: The left ventricle is normal in size. Mildly increased wall thickness. There is reduced systolic function. Biplane (2D) method of discs ejection fraction is 41%. Unable to assess diastolic function due to atrial fibrillation.  Right Ventricle: Normal right ventricular cavity size. Systolic function is mildly reduced.  Left Atrium: Left atrium is severely dilated.  Right Atrium: Right atrium is severely dilated.  Aortic Valve: The aortic valve is a trileaflet valve.  Mitral Valve: There is bileaflet sclerosis. There is moderate to severe regurgitation.  Tricuspid Valve: There is mild regurgitation.  IVC/SVC: Normal venous pressure at 3 mmHg.    Carotid US (2.7.23):  The study quality is average.   1-39% stenosis in the proximal right internal carotid artery based on Bluth Criteria. Antegrade right vertebral artery flow.   1-39% stenosis in the proximal left internal carotid artery based on Bluth Criteria. Antegrade left vertebral artery flow.     Echocardiogram (11.8.22):  The study quality is average.   The left ventricle is moderately enlarged. Left ventricular diastolic dimension is 6.4 cms. Global left ventricular systolic function is moderately decreased. The left ventricular ejection fraction is 40%. Arrhythmia noted throughout much of the exam.   There is no evidence of mitral stenosis or prolapse appreciated. MV Ortiz score ~ 5. The area by planimetry is 5.3 cm². The mean trans mitral gradient is 1.6 mmHg. Suspect borderline severe (4+) mitral regurgitation with a posteriorly directed jet. MR PISA radius ~ 0.93 cm, MR ERO ~ 0.35  cm^2, MR regurgitant volume ~ 72 ml/beat, MR regurgitant fraction ~ 55%, MR vena contracta ~ 0.54 cm.  Mild calcification of the aortic valve is noted with adequate cuspal excursion.   Trace pulmonic regurgitation. Mild (1+) tricuspid regurgitation.    Peripheral Angiogram (10.17.22):  Underwent Successful CSI Atherectomy Balloon Angioplasty and Stenting of the Right SFA and CSI Atherectomy Balloon Angioplasty of Right Anterior Tibial Artery Using Pedal Approach.    Peripheral Angiogram (9.12.22):  Underwent Successful Left SFA Laser Atherectomy Balloon Angioplasty Using Left Radial Artery Approach.    Review of Systems   Constitutional: Positive for malaise/fatigue. Negative for fever.   Cardiovascular:  Negative for chest pain and leg swelling.   Respiratory:  Positive for shortness of breath (Improving).    Skin:         Scrotal Wound   All other systems reviewed and are negative.    Objective:     Vital Signs (Most Recent):  Temp: 97.6 °F (36.4 °C) (03/30/24 1121)  Pulse: 77 (03/30/24 1218)  Resp: 20 (03/30/24 1218)  BP: (!) 143/79 (03/30/24 1121)  SpO2: 95 % (03/30/24 1218) Vital Signs (24h Range):  Temp:  [97.6 °F (36.4 °C)-98.7 °F (37.1 °C)] 97.6 °F (36.4 °C)  Pulse:  [52-85] 77  Resp:  [17-21] 20  SpO2:  [90 %-99 %] 95 %  BP: (102-143)/(49-79) 143/79   Weight: 95 kg (209 lb 7 oz)  Body mass index is 29.21 kg/m².  SpO2: 95 %       Intake/Output Summary (Last 24 hours) at 3/30/2024 1403  Last data filed at 3/30/2024 0823  Gross per 24 hour   Intake 1263.65 ml   Output 1900 ml   Net -636.35 ml       Lines/Drains/Airways       Drain  Duration                  Urethral Catheter 03/18/24 1426 Straight-tip 16 Fr. 11 days              Peripheral Intravenous Line  Duration                  Peripheral IV - Single Lumen 03/24/24 1634 18 G Anterior;Left Forearm 5 days                  Significant Labs:   Recent Results (from the past 72 hour(s))   Triglycerides    Collection Time: 03/28/24  3:52 AM   Result Value Ref  Range    Triglyceride 57 34 - 140 mg/dL   Comprehensive metabolic panel    Collection Time: 03/28/24  3:52 AM   Result Value Ref Range    Sodium Level 141 136 - 145 mmol/L    Potassium Level 4.2 3.5 - 5.1 mmol/L    Chloride 107 98 - 107 mmol/L    Carbon Dioxide 26 23 - 31 mmol/L    Glucose Level 87 82 - 115 mg/dL    Blood Urea Nitrogen 10.1 8.4 - 25.7 mg/dL    Creatinine 0.93 0.73 - 1.18 mg/dL    Calcium Level Total 8.3 (L) 8.8 - 10.0 mg/dL    Protein Total 5.9 5.8 - 7.6 gm/dL    Albumin Level 2.4 (L) 3.4 - 4.8 g/dL    Globulin 3.5 2.4 - 3.5 gm/dL    Albumin/Globulin Ratio 0.7 (L) 1.1 - 2.0 ratio    Bilirubin Total 0.6 <=1.5 mg/dL    Alkaline Phosphatase 65 40 - 150 unit/L    Alanine Aminotransferase 7 0 - 55 unit/L    Aspartate Aminotransferase 15 5 - 34 unit/L    eGFR >60 mls/min/1.73/m2   Magnesium    Collection Time: 03/28/24  3:52 AM   Result Value Ref Range    Magnesium Level 2.00 1.60 - 2.60 mg/dL   Phosphorus    Collection Time: 03/28/24  3:52 AM   Result Value Ref Range    Phosphorus Level 3.1 2.3 - 4.7 mg/dL   CBC with Differential    Collection Time: 03/28/24  3:52 AM   Result Value Ref Range    WBC 8.06 4.50 - 11.50 x10(3)/mcL    RBC 2.71 (L) 4.70 - 6.10 x10(6)/mcL    Hgb 8.4 (L) 14.0 - 18.0 g/dL    Hct 26.9 (L) 42.0 - 52.0 %    MCV 99.3 (H) 80.0 - 94.0 fL    MCH 31.0 27.0 - 31.0 pg    MCHC 31.2 (L) 33.0 - 36.0 g/dL    RDW 17.2 (H) 11.5 - 17.0 %    Platelet 380 130 - 400 x10(3)/mcL    MPV 10.1 7.4 - 10.4 fL    NRBC% 0.0 %   Manual Differential    Collection Time: 03/28/24  3:52 AM   Result Value Ref Range    WBC 8.06 x10(3)/mcL    Neutrophils % 76 %    Lymphs % 17 %    Monocytes % 7 %    nRBC % 1 %    Neutrophils Abs 6.1256 2.1 - 9.2 x10(3)/mcL    Lymphs Abs 1.3702 0.6 - 4.6 x10(3)/mcL    Monocytes Abs 0.5642 0.1 - 1.3 x10(3)/mcL    Platelets Normal Normal, Adequate    RBC Morph Abnormal (A) Normal    Poikilocytosis 1+ (A) (none)    Anisocytosis 1+ (A) (none)   Echo    Collection Time: 03/28/24 11:43 AM    Result Value Ref Range    BSA 2.12 m2    Quintana's Biplane MOD Ejection Fraction 46 %    LVIDd 5.35 3.5 - 6.0 cm    LV Systolic Volume 91.00 mL    LV Systolic Volume Index 42.3 mL/m2    LVIDs 4.47 (A) 2.1 - 4.0 cm    LV Diastolic Volume 138.00 mL    LV Diastolic Volume Index 64.19 mL/m2    IVS 1.81 (A) 0.6 - 1.1 cm    FS 16 (A) 28 - 44 %    Left Ventricle Relative Wall Thickness 0.59 cm    Posterior Wall 1.58 (A) 0.6 - 1.1 cm    LV mass 428.66 g    LV Mass Index 199 g/m2    RVDD 3.07 cm    Vn Nyquist MS 0.35 m/s    Radius 1.20 cm    Vn Nyquist 0.35 m/s    Mr max trevon 3.48 m/s    MR PISA EROA 0.91 cm2    ZLVIDS 0.40     ZLVIDD -2.66    RT Blood Gas    Collection Time: 03/28/24 11:00 PM   Result Value Ref Range    Sample Type Arterial Blood     Sample site Right Radial Artery     Drawn by rcl crt     pH, Blood gas 7.420 7.350 - 7.450    pCO2, Blood gas 40.0 35.0 - 45.0 mmHg    pO2, Blood gas 51.0 (LL) 80.0 - 100.0 mmHg    Sodium, Blood Gas 136 (L) 137 - 145 mmol/L    Potassium, Blood Gas 4.5 3.5 - 5.0 mmol/L    Calcium Level Ionized 1.16 1.12 - 1.23 mmol/L    TOC2, Blood gas 27.1 mmol/L    Base Excess, Blood gas 1.30 -2.00 - 2.00 mmol/L    sO2, Blood gas 86.4 %    HCO3, Blood gas 25.9 22.0 - 26.0 mmol/L    THb, Blood gas 9.4 (L) 12 - 16 g/dL    O2 Hb, Blood Gas 84.0 (L) 94.0 - 97.0 %    CO Hgb 2.0 (H) 0.5 - 1.5 %    Met Hgb 0.8 0.4 - 1.5 %    Allens Test Yes     Oxygen Device, Blood gas Oxy Mask     LPM 2    Comprehensive metabolic panel    Collection Time: 03/29/24 12:40 AM   Result Value Ref Range    Sodium Level 142 136 - 145 mmol/L    Potassium Level 4.7 3.5 - 5.1 mmol/L    Chloride 106 98 - 107 mmol/L    Carbon Dioxide 24 23 - 31 mmol/L    Glucose Level 106 82 - 115 mg/dL    Blood Urea Nitrogen 14.9 8.4 - 25.7 mg/dL    Creatinine 1.03 0.73 - 1.18 mg/dL    Calcium Level Total 8.9 8.8 - 10.0 mg/dL    Protein Total 6.8 5.8 - 7.6 gm/dL    Albumin Level 2.8 (L) 3.4 - 4.8 g/dL    Globulin 4.0 (H) 2.4 - 3.5 gm/dL     Albumin/Globulin Ratio 0.7 (L) 1.1 - 2.0 ratio    Bilirubin Total 0.8 <=1.5 mg/dL    Alkaline Phosphatase 80 40 - 150 unit/L    Alanine Aminotransferase 8 0 - 55 unit/L    Aspartate Aminotransferase 20 5 - 34 unit/L    eGFR >60 mls/min/1.73/m2   Magnesium    Collection Time: 03/29/24 12:40 AM   Result Value Ref Range    Magnesium Level 2.00 1.60 - 2.60 mg/dL   Phosphorus    Collection Time: 03/29/24 12:40 AM   Result Value Ref Range    Phosphorus Level 4.1 2.3 - 4.7 mg/dL   Troponin I    Collection Time: 03/29/24 12:40 AM   Result Value Ref Range    Troponin-I 0.028 0.000 - 0.045 ng/mL   CBC with Differential    Collection Time: 03/29/24 12:40 AM   Result Value Ref Range    WBC 10.55 4.50 - 11.50 x10(3)/mcL    RBC 3.02 (L) 4.70 - 6.10 x10(6)/mcL    Hgb 9.2 (L) 14.0 - 18.0 g/dL    Hct 29.5 (L) 42.0 - 52.0 %    MCV 97.7 (H) 80.0 - 94.0 fL    MCH 30.5 27.0 - 31.0 pg    MCHC 31.2 (L) 33.0 - 36.0 g/dL    RDW 17.2 (H) 11.5 - 17.0 %    Platelet 380 130 - 400 x10(3)/mcL    MPV 10.0 7.4 - 10.4 fL    Neut % 82.1 %    Lymph % 10.0 %    Mono % 6.3 %    Eos % 0.5 %    Basophil % 0.5 %    Lymph # 1.05 0.6 - 4.6 x10(3)/mcL    Neut # 8.68 2.1 - 9.2 x10(3)/mcL    Mono # 0.66 0.1 - 1.3 x10(3)/mcL    Eos # 0.05 0 - 0.9 x10(3)/mcL    Baso # 0.05 <=0.2 x10(3)/mcL    IG# 0.06 (H) 0 - 0.04 x10(3)/mcL    IG% 0.6 %    NRBC% 0.0 %   RT Blood Gas    Collection Time: 03/29/24  1:25 AM   Result Value Ref Range    Sample Type Arterial Blood     Sample site Right Radial Artery     Drawn by rcl crt     pH, Blood gas 7.430 7.350 - 7.450    pCO2, Blood gas 38.0 35.0 - 45.0 mmHg    pO2, Blood gas 65.0 (L) 80.0 - 100.0 mmHg    Sodium, Blood Gas 137 137 - 145 mmol/L    Potassium, Blood Gas 4.0 3.5 - 5.0 mmol/L    Calcium Level Ionized 1.13 1.12 - 1.23 mmol/L    TOC2, Blood gas 26.4 mmol/L    Base Excess, Blood gas 0.90 -2.00 - 2.00 mmol/L    sO2, Blood gas 93.0 %    HCO3, Blood gas 25.2 22.0 - 26.0 mmol/L    THb, Blood gas 9.3 (L) 12 - 16 g/dL    O2  Hb, Blood Gas 91.9 (L) 94.0 - 97.0 %    CO Hgb 1.9 (H) 0.5 - 1.5 %    Met Hgb 0.6 0.4 - 1.5 %    Allens Test Yes     MODE BiPAP     FIO2, Blood gas 40 %    BiPAP (I) 12 cm H2O    BiPAP (E) 5 cm H2O   Respiratory Panel    Collection Time: 03/29/24  1:26 AM   Result Value Ref Range    Adenovirus Not Detected Not Detected    Coronavirus 229E Not Detected Not Detected    Coronavirus HKU1 Not Detected Not Detected    Coronavirus NL63 Not Detected Not Detected    Coronavirus OC43 PCR, Common Cold Virus Not Detected Not Detected    Human Metapneumovirus Not Detected Not Detected    Parainfluenza Virus 1 Not Detected Not Detected    Parainfluenza Virus 2 Not Detected Not Detected    Parainfluenza Virus 3 Not Detected Not Detected    Parainfluenza Virus 4 Not Detected Not Detected    Bordetella pertussis (ptxP) Not Detected Not Detected    Chlamydia pneumoniae Not Detected Not Detected    Mycoplasma pneumoniae Not Detected Not Detected    Human Rhinovirus/Enterovirus Not Detected Not Detected    Bordetella parapertussis (MG5334) Not Detected Not Detected   MRSA PCR    Collection Time: 03/29/24  1:26 AM   Result Value Ref Range    MRSA PCR SCRN ([facility]) Not Detected Not Detected   Triglycerides    Collection Time: 03/29/24  5:02 AM   Result Value Ref Range    Triglyceride 46 34 - 140 mg/dL   Triglycerides    Collection Time: 03/30/24  4:20 AM   Result Value Ref Range    Triglyceride 41 34 - 140 mg/dL   Magnesium    Collection Time: 03/30/24  4:20 AM   Result Value Ref Range    Magnesium Level 2.00 1.60 - 2.60 mg/dL   Basic Metabolic Panel    Collection Time: 03/30/24  4:20 AM   Result Value Ref Range    Sodium Level 140 136 - 145 mmol/L    Potassium Level 3.7 3.5 - 5.1 mmol/L    Chloride 104 98 - 107 mmol/L    Carbon Dioxide 24 23 - 31 mmol/L    Glucose Level 166 (H) 82 - 115 mg/dL    Blood Urea Nitrogen 24.2 8.4 - 25.7 mg/dL    Creatinine 1.02 0.73 - 1.18 mg/dL    BUN/Creatinine Ratio 24     Calcium Level Total 9.0 8.8 - 10.0  mg/dL    Anion Gap 12.0 mEq/L    eGFR >60 mls/min/1.73/m2   CBC with Differential    Collection Time: 03/30/24  4:20 AM   Result Value Ref Range    WBC 13.71 (H) 4.50 - 11.50 x10(3)/mcL    RBC 2.91 (L) 4.70 - 6.10 x10(6)/mcL    Hgb 8.9 (L) 14.0 - 18.0 g/dL    Hct 27.7 (L) 42.0 - 52.0 %    MCV 95.2 (H) 80.0 - 94.0 fL    MCH 30.6 27.0 - 31.0 pg    MCHC 32.1 (L) 33.0 - 36.0 g/dL    RDW 16.9 11.5 - 17.0 %    Platelet 339 130 - 400 x10(3)/mcL    MPV 10.3 7.4 - 10.4 fL    Neut % 95.1 %    Lymph % 2.6 %    Mono % 1.9 %    Eos % 0.0 %    Basophil % 0.1 %    Lymph # 0.35 (L) 0.6 - 4.6 x10(3)/mcL    Neut # 13.05 (H) 2.1 - 9.2 x10(3)/mcL    Mono # 0.26 0.1 - 1.3 x10(3)/mcL    Eos # 0.00 0 - 0.9 x10(3)/mcL    Baso # 0.01 <=0.2 x10(3)/mcL    IG# 0.04 0 - 0.04 x10(3)/mcL    IG% 0.3 %    NRBC% 0.0 %     Telemetry: PAF/CVR    Physical Exam  Vitals reviewed.   Constitutional:       General: He is not in acute distress.     Appearance: Normal appearance. He is ill-appearing.   HENT:      Head: Normocephalic.      Mouth/Throat:      Mouth: Mucous membranes are moist.   Eyes:      Conjunctiva/sclera: Conjunctivae normal.   Cardiovascular:      Rate and Rhythm: Normal rate. Rhythm irregular.      Heart sounds: Murmur heard.      Comments: Right wrist soft, nontender. No hematoma noted. + 2 peripheral pulse  Pulmonary:      Effort: Pulmonary effort is normal. No respiratory distress.      Breath sounds: Decreased breath sounds and rales present.      Comments: 2 L NC  Abdominal:      Palpations: Abdomen is soft.   Musculoskeletal:         General: No swelling.      Right lower leg: No edema.      Left lower leg: No edema.   Neurological:      General: No focal deficit present.      Mental Status: He is alert and oriented to person, place, and time.   Psychiatric:         Mood and Affect: Mood normal.         Behavior: Behavior normal.         Judgment: Judgment normal.       Current Inpatient Medications:    Current Facility-Administered  Medications:     0.9%  NaCl infusion (for blood administration), , Intravenous, Q24H PRN, Carmen Griggs, FNMINA    0.9%  NaCl infusion (for blood administration), , Intravenous, Q24H PRN, Joshua Hernandez ANP    acetaminophen tablet 650 mg, 650 mg, Oral, Q8H PRN, Harris Hernandez MD    albuterol-ipratropium 2.5 mg-0.5 mg/3 mL nebulizer solution 3 mL, 3 mL, Nebulization, Q4H, Natali Seymour MD, 3 mL at 03/30/24 1218    amiodarone tablet 200 mg, 200 mg, Oral, BID, Davida Mo FNP    [START ON 4/3/2024] amiodarone tablet 200 mg, 200 mg, Oral, Daily, Davida Mo FNP    aspirin EC tablet 81 mg, 81 mg, Oral, Daily, Jori Santos MD, 81 mg at 03/30/24 0941    atorvastatin tablet 80 mg, 80 mg, Oral, Daily, Cassidy Obrien MD, 80 mg at 03/30/24 0940    chlorhexidine 0.12 % solution 15 mL, 15 mL, Mouth/Throat, BID, David Gonsalez MD, 15 mL at 03/30/24 0942    dextrose 10 % infusion, , Intravenous, PRN, David Gonsalez MD    dextrose 10 % infusion, , Intravenous, PRN, David Gonsalez MD    dextrose 10% bolus 125 mL 125 mL, 12.5 g, Intravenous, PRN, David Gonsalez MD    dextrose 10% bolus 250 mL 250 mL, 25 g, Intravenous, PRN, David Gonsalez MD    doxycycline tablet 100 mg, 100 mg, Oral, Q12H, Jori Santos MD, 100 mg at 03/30/24 0940    enoxaparin injection 90 mg, 1 mg/kg, Subcutaneous, Q12H (prophylaxis, 0900/2100), Natali Seymour MD, 90 mg at 03/30/24 0942    folic acid tablet 1 mg, 1 mg, Oral, Daily, Jenna Miller MD, 1 mg at 03/30/24 0940    furosemide injection 40 mg, 40 mg, Intravenous, Daily, Jori Snatos MD, 40 mg at 03/30/24 0943    hydrALAZINE injection 10 mg, 10 mg, Intravenous, Q4H PRN, Adriano Montiel MD    insulin aspart U-100 injection 0-10 Units, 0-10 Units, Subcutaneous, Q6H PRN, Narendra Pradhan DO    ipratropium 0.02 % nebulizer solution 0.5 mg, 0.5 mg, Nebulization, Q6H PRN, David Gonsalez MD    LORazepam tablet 2 mg, 2 mg, Oral, Q4H PRN, Jenna Miller  MD HAYLIE, 2 mg at 03/29/24 2132    melatonin tablet 6 mg, 6 mg, Oral, Nightly PRN, Harris Hernandez MD, 6 mg at 03/29/24 2134    methylPREDNISolone sodium succinate injection 80 mg, 80 mg, Intravenous, Q12H, Harris Hernandez MD, 80 mg at 03/30/24 0600    midodrine tablet 10 mg, 10 mg, Oral, TID WM, Narendra Pradhan DO, 10 mg at 03/30/24 1311    morphine injection 2 mg, 2 mg, Intravenous, Q6H PRN, David Gonsalez MD, 2 mg at 03/29/24 0959    multivitamin tablet, 1 tablet, Oral, Daily, Jenna Miller MD, 1 tablet at 03/30/24 0900    ondansetron injection 4 mg, 4 mg, Intravenous, Q8H PRN, Adriano Montiel MD    oxyCODONE immediate release tablet 5 mg, 5 mg, Oral, Q4H PRN, Harris Hernandez MD, 5 mg at 03/30/24 0600    pantoprazole injection 40 mg, 40 mg, Intravenous, BID, Cassidy Obrien MD, 40 mg at 03/30/24 0943    phenazopyridine tablet 100 mg, 100 mg, Oral, TID WM, Jori Santos MD, 100 mg at 03/30/24 1312    piperacillin-tazobactam (ZOSYN) 4.5 g in dextrose 5 % in water (D5W) 100 mL IVPB (MB+), 4.5 g, Intravenous, Q8H, Jori Santos MD, Last Rate: 25 mL/hr at 03/30/24 1334, 4.5 g at 03/30/24 1334    sodium chloride 0.9% flush 10 mL, 10 mL, Intravenous, PRN, Narendra Pradhan DO, 10 mL at 03/29/24 2206    sodium chloride 0.9% flush 10 mL, 10 mL, Intravenous, PRN, David Gonsalez MD    Facility-Administered Medications Ordered in Other Encounters:     0.9%  NaCl infusion, , Intravenous, Continuous, Shannon Milligan MD, Last Rate: 10 mL/hr at 03/09/23 1020, New Bag at 03/09/23 1020    diphenhydrAMINE injection 25 mg, 25 mg, Intravenous, Once PRN, Shannon Milligan MD    LIDOcaine (PF) 10 mg/ml (1%) injection 10 mg, 1 mL, Intradermal, Once, Lanette Ulloa FNP    LIDOcaine (PF) 10 mg/ml (1%) injection 10 mg, 1 mL, Intradermal, Once, Shannon Milligan MD    ondansetron injection 4 mg, 4 mg, Intravenous, Once, Shannon Milligan MD    prochlorperazine injection Soln 5 mg, 5 mg,  Intravenous, Once PRN, Shannon Milligan MD  VTE Risk Mitigation (From admission, onward)           Ordered     enoxaparin injection 90 mg  Every 12 hours         03/24/24 0901     IP VTE LOW RISK PATIENT  Once         03/19/24 0422     Place sequential compression device  Until discontinued         03/18/24 0124                  Assessment:   Cardiac Arrest/VT (Post Operative Left Héctor Orchiectomy - in PACU 3.18.24)     - Requiring Multiple Defibrillations (x 3)  NSTEMI Type 2 in the Setting of Cardiac Arrest, Anemia  Anemia - Stable    - Cleared by GI for DAPT/AC    - Requiring Transfusion    - EGD (3.22.24) - LA Grade B Reflux Esophagitis with No Bleeding, Chronic Gastritis, Protonix; Colonoscopy as OP 2/2 Scrotal Wound  Chronic Atrial Fibrillation - Now AF CVR (HR 50's-60s with Intermittent PVCS)    - Xarelto on Hold Post Scrotal Abscess I&D (On FD Lovenox)  Non Ischemic Cardiomyopathy - Recovered LVEF    - EF 30-40%    - EF   Nonobstructive CAD    - [facility] 3.26.24: Left main 10-20%, LAD 50%  Acute Hypoxemic Respiratory Failure requiring Intubation/Ventilation - Now Extubated on Supplemental O2  Hypotension Requiring Vasopressor Support - Resolved     - History of Hypertension  Valvular Heart Disease    - MR: Moderate, TR: Mild to Moderate  Hyperlipidemia    - On Statin  SIRS - Likely UTI Related - Resolved     - BC x 2 Negative at 5 Days  Scrotal Abscess    - Status Post Surgical Exploration, Left Orchiectomy, & Debridement of Wound/Wound Packing (3.18.24)   COPD  Partial Bowel Obstruction - Resolved   Long Term Oral Anticoagulation  PAD  Infrarenal Abdominal Aortic Aneurysmal Dilatation with Mural Thrombus (Stable)    - Maximum diameter is 3.6 cm without significant interval change   Acute Human Metapneumovirus Infection (On Droplet Precautions) - Off Precautions    Plan:   Echo from 3.27.24 reviewed with Dr. Rosado. No need for further testing.   Continue PO amio load.   GI Cleared PT for DAPT/AC. Continue ASA  81 mg daily. Continue FD lovenox for CVA prophylaxis in the setting of PAF. Resume Xarelto prior to discharge.   Start Toprol XL 12.5 mg daily.   Continue IV lasix 40 mg IVP daily.  Ensure accurate I&O's and daily weights.   Antibiotic Management/Wound Care as per Primary Team  ECHO reviewed. Does not qualify for lifevest.     Davida Mo, MALI  Cardiology  [facility]  03/30/2024

## 2024-03-30 NOTE — PROGRESS NOTES
Hospital Medicine  Progress Note    Patient Name: Ran Reyes Jr.  MRN: [id number]  Status: IP- Inpatient   Admission Date: 3/17/2024  Length of Stay: 12  Date of Service: 03/30/2024       CC: hospital follow-up for respiratory failure       SUBJECTIVE   HPI and hospital course reviewed.   Today: Patient seen and examined at bedside, and chart reviewed.  Developed worsening hypoxemia overnight on 3/28.  CXR has noted bilateral scattered opacities, and he was requiring BiPAP for oxygenation.  Antibiotics broadened, and he was initiated on IV Lasix.  Has continued to diuresis well, another 3.75L overnight (negative fluid balance >5L over last 48hrs).  Oxygen has been weaned down to 2L.  Patient remains afebrile.      MEDICATIONS   Scheduled   albuterol-ipratropium  3 mL Nebulization Q4H    amiodarone  200 mg Oral BID    [START ON 4/3/2024] amiodarone  200 mg Oral Daily    aspirin  81 mg Oral Daily    atorvastatin  80 mg Oral Daily    chlorhexidine  15 mL Mouth/Throat BID    doxycycline  100 mg Oral Q12H    enoxparin  1 mg/kg Subcutaneous Q12H (prophylaxis, 0900/2100)    folic acid  1 mg Oral Daily    furosemide  40 mg Intravenous Daily    methylPREDNISolone sodium succinate injection  80 mg Intravenous Q12H    metoprolol succinate  12.5 mg Oral Daily    midodrine  10 mg Oral TID WM    multivitamin  1 tablet Oral Daily    pantoprazole  40 mg Intravenous BID    phenazopyridine  100 mg Oral TID WM    piperacillin-tazobactam (Zosyn) IV (PEDS and ADULTS) (extended infusion is not appropriate)  4.5 g Intravenous Q8H     Continuous Infusions  None      PHYSICAL EXAM   VITALS: T 98.8 °F (37.1 °C)   /65   P 97   RR (!) 25   O2 (!) 94 %    GENERAL: Awake and in NAD  LUNGS: decreased air movement in the bases  CVS: Normal rate  GI/: Soft, NT, bowel sounds positive.  EXTREMITIES: 1-2+ LE edema  NEURO: AAOx3  PSYCH: Cooperative      LABS   CBC  Recent Labs     03/29/24  0040 03/30/24  0420   WBC 10.55 13.71*   RBC 3.02*  2.91*   HGB 9.2* 8.9*   HCT 29.5* 27.7*   MCV 97.7* 95.2*   MCH 30.5 30.6   MCHC 31.2* 32.1*   RDW 17.2* 16.9    339       CHEM  Recent Labs     03/28/24  0352 03/29/24  0040 03/30/24  0420    142 140   K 4.2 4.7 3.7   CHLORIDE 107 106 104   CO2 26 24 24   BUN 10.1 14.9 24.2   CREATININE 0.93 1.03 1.02   GLUCOSE 87 106 166*   CALCIUM 8.3* 8.9 9.0   MG 2.00 2.00 2.00   PHOS 3.1 4.1  --    ALBUMIN 2.4* 2.8*  --    GLOBULIN 3.5 4.0*  --    ALKPHOS 65 80  --    ALT 7 8  --    AST 15 20  --    BILITOT 0.6 0.8  --            MICROBIOLOGY     Microbiology Results (last 7 days)       Procedure Component Value Units Date/Time    Respiratory Culture [[id number]]     Order Status: Sent Specimen: Sputum, Expectorated     Blood Culture [[id number]]  (Normal) Collected: 03/18/24 2226    Order Status: Completed Specimen: Blood, Venous Updated: 03/23/24 2300     CULTURE, BLOOD ([facility]) No Growth at 5 days              ASSESSMENT   Scrotal abscess, with component of early Claudine's, s/p exploration, I&D, and left orchiectomy 03/18.  Severe sepsis from above, resolving  Questionable RLL post-viral Pneumonia, Human Metapneumovirus positive, resolving  Acute hypoxic respiratory failure from above  Cardiac arrest with ventricular tachycardia requiring defibrillation x3 (03/18/2024)  AFib RVR  NSTMI, unspecified  Partial bowel obstruction  Normocytic anemia.    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  Mild ELIZABETH, resolved    PLAN   Continue diuresis with 40 IV Lasix daily  Monitor I&Os, renal function and electrolytes  Wean supplemental oxygen as tolerated  Continue IV Zosyn, doxycyline for now  Continue ASA, FD Lovenox and statin  Monitoring H&H  Continue PPI BID (1 month, then daily), and Carafate (7 days total)  Amiodarone per Cardiology  Otherwise continue current management and monitoring in the  interim      Prophylaxis: FD Lovenox        Jori Santos MD  [facility] Medicine      Critical Care Time: 35 minutes spent in direct hands on care, review of labs, imaging and medical record, and discussion of diagnosis, treatment, prognosis with patient/family.  Patient remains at high-risk for clinical decompensation  Critical Care diagnosis: Acute hypoxic respiratory failure requiring NIPPV

## 2024-03-31 LAB
ANION GAP SERPL CALC-SCNC: 14 MEQ/L
BUN SERPL-MCNC: 29.2 MG/DL (ref 8.4–25.7)
CALCIUM SERPL-MCNC: 8.9 MG/DL (ref 8.8–10)
CHLORIDE SERPL-SCNC: 103 MMOL/L (ref 98–107)
CO2 SERPL-SCNC: 25 MMOL/L (ref 23–31)
CREAT SERPL-MCNC: 1.09 MG/DL (ref 0.73–1.18)
CREAT/UREA NIT SERPL: 27
GFR SERPLBLD CREATININE-BSD FMLA CKD-EPI: >60 MLS/MIN/1.73/M2
GLUCOSE SERPL-MCNC: 169 MG/DL (ref 82–115)
MAGNESIUM SERPL-MCNC: 2 MG/DL (ref 1.6–2.6)
POTASSIUM SERPL-SCNC: 3.4 MMOL/L (ref 3.5–5.1)
SODIUM SERPL-SCNC: 142 MMOL/L (ref 136–145)
TRIGL SERPL-MCNC: 58 MG/DL (ref 34–140)

## 2024-03-31 PROCEDURE — 94760 N-INVAS EAR/PLS OXIMETRY 1: CPT

## 2024-03-31 PROCEDURE — 94640 AIRWAY INHALATION TREATMENT: CPT

## 2024-03-31 PROCEDURE — 25000003 PHARM REV CODE 250: Performed by: INTERNAL MEDICINE

## 2024-03-31 PROCEDURE — 25000003 PHARM REV CODE 250

## 2024-03-31 PROCEDURE — 99900035 HC TECH TIME PER 15 MIN (STAT)

## 2024-03-31 PROCEDURE — C9113 INJ PANTOPRAZOLE SODIUM, VIA: HCPCS | Performed by: INTERNAL MEDICINE

## 2024-03-31 PROCEDURE — 63600175 PHARM REV CODE 636 W HCPCS: Performed by: INTERNAL MEDICINE

## 2024-03-31 PROCEDURE — 27000221 HC OXYGEN, UP TO 24 HOURS

## 2024-03-31 PROCEDURE — 99900031 HC PATIENT EDUCATION (STAT)

## 2024-03-31 PROCEDURE — 21400001 HC TELEMETRY ROOM

## 2024-03-31 PROCEDURE — 80048 BASIC METABOLIC PNL TOTAL CA: CPT | Performed by: INTERNAL MEDICINE

## 2024-03-31 PROCEDURE — 84478 ASSAY OF TRIGLYCERIDES: CPT

## 2024-03-31 PROCEDURE — 83735 ASSAY OF MAGNESIUM: CPT | Performed by: INTERNAL MEDICINE

## 2024-03-31 PROCEDURE — 63600175 PHARM REV CODE 636 W HCPCS: Mod: JZ,JG

## 2024-03-31 PROCEDURE — 25000003 PHARM REV CODE 250: Performed by: STUDENT IN AN ORGANIZED HEALTH CARE EDUCATION/TRAINING PROGRAM

## 2024-03-31 PROCEDURE — 25000242 PHARM REV CODE 250 ALT 637 W/ HCPCS: Performed by: INTERNAL MEDICINE

## 2024-03-31 RX ORDER — AMOXICILLIN AND CLAVULANATE POTASSIUM 875; 125 MG/1; MG/1
1 TABLET, FILM COATED ORAL
Status: DISCONTINUED | OUTPATIENT
Start: 2024-03-31 | End: 2024-04-03

## 2024-03-31 RX ORDER — POTASSIUM CHLORIDE 20 MEQ/1
20 TABLET, EXTENDED RELEASE ORAL ONCE
Status: COMPLETED | OUTPATIENT
Start: 2024-03-31 | End: 2024-03-31

## 2024-03-31 RX ORDER — POTASSIUM CHLORIDE 20 MEQ/1
40 TABLET, EXTENDED RELEASE ORAL ONCE
Status: COMPLETED | OUTPATIENT
Start: 2024-03-31 | End: 2024-03-31

## 2024-03-31 RX ADMIN — PANTOPRAZOLE SODIUM 40 MG: 40 INJECTION, POWDER, FOR SOLUTION INTRAVENOUS at 08:03

## 2024-03-31 RX ADMIN — IPRATROPIUM BROMIDE AND ALBUTEROL SULFATE 3 ML: 2.5; .5 SOLUTION RESPIRATORY (INHALATION) at 11:03

## 2024-03-31 RX ADMIN — IPRATROPIUM BROMIDE AND ALBUTEROL SULFATE 3 ML: 2.5; .5 SOLUTION RESPIRATORY (INHALATION) at 07:03

## 2024-03-31 RX ADMIN — ENOXAPARIN SODIUM 90 MG: 100 INJECTION SUBCUTANEOUS at 09:03

## 2024-03-31 RX ADMIN — IPRATROPIUM BROMIDE AND ALBUTEROL SULFATE 3 ML: 2.5; .5 SOLUTION RESPIRATORY (INHALATION) at 04:03

## 2024-03-31 RX ADMIN — PHENAZOPYRIDINE HYDROCHLORIDE 100 MG: 100 TABLET ORAL at 08:03

## 2024-03-31 RX ADMIN — OXYCODONE HYDROCHLORIDE 5 MG: 5 TABLET ORAL at 01:03

## 2024-03-31 RX ADMIN — METOPROLOL SUCCINATE 12.5 MG: 25 TABLET, EXTENDED RELEASE ORAL at 08:03

## 2024-03-31 RX ADMIN — CHLORHEXIDINE GLUCONATE 0.12% ORAL RINSE 15 ML: 1.2 LIQUID ORAL at 08:03

## 2024-03-31 RX ADMIN — MIDODRINE HYDROCHLORIDE 10 MG: 5 TABLET ORAL at 01:03

## 2024-03-31 RX ADMIN — PIPERACILLIN SODIUM AND TAZOBACTAM SODIUM 4.5 G: 4; .5 INJECTION, POWDER, LYOPHILIZED, FOR SOLUTION INTRAVENOUS at 06:03

## 2024-03-31 RX ADMIN — AMIODARONE HYDROCHLORIDE 200 MG: 200 TABLET ORAL at 09:03

## 2024-03-31 RX ADMIN — FOLIC ACID 1 MG: 1 TABLET ORAL at 08:03

## 2024-03-31 RX ADMIN — MORPHINE SULFATE 2 MG: 4 INJECTION, SOLUTION INTRAMUSCULAR; INTRAVENOUS at 10:03

## 2024-03-31 RX ADMIN — DOXYCYCLINE HYCLATE 100 MG: 100 TABLET, COATED ORAL at 09:03

## 2024-03-31 RX ADMIN — METHYLPREDNISOLONE SODIUM SUCCINATE 80 MG: 40 INJECTION, POWDER, FOR SOLUTION INTRAMUSCULAR; INTRAVENOUS at 06:03

## 2024-03-31 RX ADMIN — POTASSIUM CHLORIDE 40 MEQ: 1500 TABLET, EXTENDED RELEASE ORAL at 10:03

## 2024-03-31 RX ADMIN — PHENAZOPYRIDINE HYDROCHLORIDE 100 MG: 100 TABLET ORAL at 05:03

## 2024-03-31 RX ADMIN — MORPHINE SULFATE 2 MG: 4 INJECTION, SOLUTION INTRAMUSCULAR; INTRAVENOUS at 02:03

## 2024-03-31 RX ADMIN — THERA TABS 1 TABLET: TAB at 08:03

## 2024-03-31 RX ADMIN — FUROSEMIDE 40 MG: 10 INJECTION, SOLUTION INTRAMUSCULAR; INTRAVENOUS at 08:03

## 2024-03-31 RX ADMIN — PIPERACILLIN SODIUM AND TAZOBACTAM SODIUM 4.5 G: 4; .5 INJECTION, POWDER, LYOPHILIZED, FOR SOLUTION INTRAVENOUS at 02:03

## 2024-03-31 RX ADMIN — DOXYCYCLINE HYCLATE 100 MG: 100 TABLET, COATED ORAL at 08:03

## 2024-03-31 RX ADMIN — ATORVASTATIN CALCIUM 80 MG: 40 TABLET, FILM COATED ORAL at 08:03

## 2024-03-31 RX ADMIN — ENOXAPARIN SODIUM 90 MG: 100 INJECTION SUBCUTANEOUS at 08:03

## 2024-03-31 RX ADMIN — POTASSIUM CHLORIDE 20 MEQ: 1500 TABLET, EXTENDED RELEASE ORAL at 05:03

## 2024-03-31 RX ADMIN — IPRATROPIUM BROMIDE AND ALBUTEROL SULFATE 3 ML: 2.5; .5 SOLUTION RESPIRATORY (INHALATION) at 09:03

## 2024-03-31 RX ADMIN — IPRATROPIUM BROMIDE AND ALBUTEROL SULFATE 3 ML: 2.5; .5 SOLUTION RESPIRATORY (INHALATION) at 05:03

## 2024-03-31 RX ADMIN — PHENAZOPYRIDINE HYDROCHLORIDE 100 MG: 100 TABLET ORAL at 01:03

## 2024-03-31 RX ADMIN — MIDODRINE HYDROCHLORIDE 10 MG: 5 TABLET ORAL at 05:03

## 2024-03-31 RX ADMIN — Medication 6 MG: at 09:03

## 2024-03-31 RX ADMIN — ASPIRIN 81 MG: 81 TABLET, COATED ORAL at 08:03

## 2024-03-31 RX ADMIN — CHLORHEXIDINE GLUCONATE 0.12% ORAL RINSE 15 ML: 1.2 LIQUID ORAL at 09:03

## 2024-03-31 RX ADMIN — AMIODARONE HYDROCHLORIDE 200 MG: 200 TABLET ORAL at 08:03

## 2024-03-31 RX ADMIN — MIDODRINE HYDROCHLORIDE 10 MG: 5 TABLET ORAL at 08:03

## 2024-03-31 RX ADMIN — AMOXICILLIN AND CLAVULANATE POTASSIUM 1 TABLET: 875; 125 TABLET, FILM COATED ORAL at 09:03

## 2024-03-31 RX ADMIN — PANTOPRAZOLE SODIUM 40 MG: 40 INJECTION, POWDER, FOR SOLUTION INTRAVENOUS at 09:03

## 2024-03-31 NOTE — PROGRESS NOTES
Hospital Medicine  Progress Note    Patient Name: Ran Reyes Jr.  MRN: [id number]  Status: IP- Inpatient   Admission Date: 3/17/2024  Length of Stay: 13  Date of Service: 03/31/2024       CC: hospital follow-up for respiratory failure       SUBJECTIVE   68-year-old male with a history that includes VHD, nonischemic cardiomyopathy (with an EF of 41%),  chronic AFib on Xarelto, peripheral arterial disease, COPD, and a large left testicle hydrocele, presented to the ED 3/17 complaining of what he thought was rectal bleeding as he was noting blood in the toilet and running down his legs.  Ultimately was found that the bleeding was coming from the posterior aspect of his scrotum from a superficial scrotal ulceration.  He also complained of persistent testicular swelling and pain.  Doppler ultrasound was significantly limited, but could not rule out the presence of torsion.  Urology was consulted and they made decision to proceed with emergent scrotal exploration.  While still in PACU, patient was hypotensive with blood persistently 80s/50s, this despite fluid resuscitation.   Patient subsequently transferred to the ICU for vasopressor support.  Additionally developed cardiac arrest with ventricular tachycardia requiring defibrillation x3 (03/18/2024), with ROSC.   He was continued on IV antibiotics, and eventually weaned off pressors.  Additionally FOB noted to be positive.  GI consulted and planned for EGD.  Cardiology involved as well, with plans for Ischemic evaluation.  Cleared for AC and started on FD lovenox.  Underwent EGD 3/22 noting grade B reflux esophagitis, chronic gastritis, chronic duodenitis.  GI considering outpatient colonoscopy, recommended Carafate for 7 days and continued PPI BID for a month on discharge and then daily for 3 months.  Cardiology on board, awaiting ischemic evaluation.  Urology continued to follow.  Patient continued on IV antibiotics; working with therapy.  On  evening of 3/28 he  developed worsening hypoxemia.  CXR noted bilateral scattered opacities, and he was requiring BiPAP for oxygenation.  Antibiotics broadened, and he was initiated on IV Lasix.  He subsequently diuresed well (a negative fluid balance >5L over 48hrs).  Subsequently transitioned off BiPAP back to supplemental oxygen, and remained stable.     Today: Patient seen and examined at bedside, and chart reviewed.  Remains afebrile.  Oxygenation stable on 2L, renal function grossly unchanged with diuresis.      MEDICATIONS   Scheduled   albuterol-ipratropium  3 mL Nebulization Q4H    amiodarone  200 mg Oral BID    [START ON 4/3/2024] amiodarone  200 mg Oral Daily    aspirin  81 mg Oral Daily    atorvastatin  80 mg Oral Daily    chlorhexidine  15 mL Mouth/Throat BID    doxycycline  100 mg Oral Q12H    enoxparin  1 mg/kg Subcutaneous Q12H (prophylaxis, 0900/2100)    folic acid  1 mg Oral Daily    furosemide  40 mg Intravenous Daily    methylPREDNISolone sodium succinate injection  80 mg Intravenous Q12H    metoprolol succinate  12.5 mg Oral Daily    midodrine  10 mg Oral TID WM    multivitamin  1 tablet Oral Daily    pantoprazole  40 mg Intravenous BID    phenazopyridine  100 mg Oral TID WM    piperacillin-tazobactam (Zosyn) IV (PEDS and ADULTS) (extended infusion is not appropriate)  4.5 g Intravenous Q8H    potassium chloride  20 mEq Oral Once     Continuous Infusions  None      PHYSICAL EXAM   VITALS: T 97.8 °F (36.6 °C)   /76   P 64   RR 18   O2 96 %    GENERAL: Awake and in NAD  LUNGS: decreased air movement in the bases  CVS: Normal rate  GI/: Soft, NT, bowel sounds positive.  EXTREMITIES: 1-2+ LE edema  NEURO: AAOx3  PSYCH: Cooperative      LABS   CBC  Recent Labs     03/29/24  0040 03/30/24  0420   WBC 10.55 13.71*   RBC 3.02* 2.91*   HGB 9.2* 8.9*   HCT 29.5* 27.7*   MCV 97.7* 95.2*   MCH 30.5 30.6   MCHC 31.2* 32.1*   RDW 17.2* 16.9    339       CHEM  Recent Labs     03/29/24  0040 03/30/24  0420  03/31/24  0410    140 142   K 4.7 3.7 3.4*   CHLORIDE 106 104 103   CO2 24 24 25   BUN 14.9 24.2 29.2*   CREATININE 1.03 1.02 1.09   GLUCOSE 106 166* 169*   CALCIUM 8.9 9.0 8.9   MG 2.00 2.00 2.00   PHOS 4.1  --   --    ALBUMIN 2.8*  --   --    GLOBULIN 4.0*  --   --    ALKPHOS 80  --   --    ALT 8  --   --    AST 20  --   --    BILITOT 0.8  --   --            MICROBIOLOGY     Microbiology Results (last 7 days)       Procedure Component Value Units Date/Time    Respiratory Culture [[id number]]     Order Status: Sent Specimen: Sputum, Expectorated               ASSESSMENT   Scrotal abscess, with component of early Claudine's, s/p exploration, I&D, and left orchiectomy 03/18.  Severe sepsis from above, resolving  Questionable RLL post-viral Pneumonia, Human Metapneumovirus positive, resolving  Acute hypoxic respiratory failure from above  Cardiac arrest with ventricular tachycardia requiring defibrillation x3 (03/18/2024)  Acute on chronic diastolic heart failure  AFib RVR  NSTMI, unspecified  Partial bowel obstruction  Normocytic anemia.    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  Mild ELIZABETH, resolved    PLAN   Continue diuresis with 40 IV Lasix daily  Monitor I&Os, renal function and electrolytes  Wean supplemental oxygen as tolerated  Continue antibiotics, though can transition Zosyn back to oral Augmentin, continue doxycyline for now  Continue ASA, FD Lovenox and statin  Monitoring H&H  Continue PPI BID (1 month, then daily)  Amiodarone per Cardiology  Otherwise continue current management and monitoring in the interim      Prophylaxis: FD Lovenox        Jori Santos MD  [facility] Medicine      Critical Care Time: 32 minutes spent in direct hands on care, review of labs, imaging and medical record, and discussion of diagnosis, treatment, prognosis with patient/family.  Patient remains at high-risk for  clinical decompensation  Critical Care diagnosis: ADHF

## 2024-03-31 NOTE — PROGRESS NOTES
"  [facility]    Cardiology  Progress Note    Patient Name: Ran Reyse Jr.  MRN: [id number]  Admission Date: 3/17/2024  Hospital Length of Stay: 13 days  Code Status: Full Code   Attending Physician: Jori Santos MD   Primary Care Physician: Abiodun Recinos DO  Expected Discharge Date:   Principal Problem:Scrotal hematoma    Subjective:   Chief Complaint/Reason for Consult: OAC recs     HPI: Ran Reyes Jr. Is a 68 year old male, known to Dr. Wagner, with PMH of  cardiomyopathy, systolic heart failure with an EF of 41%, VHD, chronic AFib on Xarelto, peripheral arterial disease, COPD, HTN, and a large left testicle hydrocele who presented to the ED 3/18/24 for scrotal bleeding with testicular swelling and pain. Found to have sepsis likely due to UTI, acute bronchitis. Also found in ED to be in Afib RVR on EKG, bp 90s/60s. BNP 1273, troponin 0.045 --> 0.055. Patient admits to dyspnea, cough, and wheezing. Denies chest pains. Cardiology being consulted for OAC recommendations.    Hospital Course:  3.19.24: Intubated/Mechanical Ventilation. AF SVR. On Vasopressor Support. Family at bedside.   3.20.24: NAD. Vented/Sedated. PAF/CVR. Dobutamine 2.5mcg/kg/min. Amiodarone 0.5mg/min.   3.21.24: NAD. Extubated. "I am ok." PAF/CVR. Levophed 0.01mcg/kg/min. Oral Amiodarone.   3.22.24: NAD. "I am feeling better." PAF/SVR but Mostly CVR. Off Pressors.   3.23.24: NAD. "I am good." PAF/SVR - Mostly CVR. No Profound Bradycardia since Yesterday. Remains off Pressors.   3.24.24: NAD. "I am fine." PAF/SVR, Mostly CVR in 60s, PVCs. H&H 7.3/23.3 - Transfusion of PRBCs.  3.25.24: NAD. "I am ok." PAF/SVR. Mostly CVR 60s-70s. H&H 8.7/26.2; GI Clear for DAPT/AC  3.26.24: NAD. Sitting in Bedside Chair. "I am feeling pretty good." H&H 8.5/26.8 s/p 1 Unit PRBCS on 3.25.24. Simple Face Mask. Denies CP, SOB and Palps.   3.27.24: NAD. Denies CP, SOB, or palps. Right wrist benign. "I feel good." H&H stable. On 5 L face mask. " "  3.28.24: NAD. Denies Cp, SOB, or palps. AF CVR on tele. BP stable. On 3 L NC. "I am okay."   3.29.24: NAD. Currently on Bipap. AF CVR on tele. Episode of hypoxia overnight. Started on BIPAP. CXR demonstrating right middle lobe infiltrate consistent with PNA. Started on antibiotics and IV antibiotics.   3.30.24: NAD. Reports feeling much better. Down to 2 L NC. Daily net negative 2544 mL/24 hours. AF CVR on tele. BP stable.   3.31.24: NAD. "I feel fine." 2 L NC. Inaccurate I&O's but 2000 mL output documented yesterday evening. AF CVR on tele. BP stable. K 3.4.      PMH: Cardiomyopathy, systolic heart failure with an EF of 41%, VHD, chronic AFib on Xarelto, peripheral arterial disease, COPD, HTN, CAD (Details Unclear)  PSH: cardiac stent >10 years ago, L FA atherectomy and angioplasty, R SFA stent, cataract extraction,   Family History: Father MI at age 66.   Social History: 10+ pack year hx current smoker, social EtOH, denies drugs.     Previous Cardiac Diagnostics:   TTE (3.28.24):  Left Ventricle: The left ventricle is normal in size. Moderately increased wall thickness. There is low normal systolic function with a visually estimated ejection fraction of 50 - 55%. There is diastolic dysfunction.  Mitral Valve: There is moderate regurgitation.    LHC (3.27.24):  Left Atrium: Agitated saline study of the atrial septum is faintly positive. Small amount of late bubbles crossing only with valsalva images. Could be via pulmonary transit. No clear atrial level shunt.      LHC (3.26.24):  Coronary findings:  Dominance: right   Left main:  10-20% calcified distal left main disease  Left anterior descending artery:  50% calcified proximal stenosis  Circumflex artery:  Luminal irregularities  Right coronary artery:  Luminal irregularities    Echocardiogram (3.19.24):  Left Ventricle: The left ventricle is normal in size. Increased wall thickness. Unable to assess wall motion. There is moderately reduced systolic function " with a visually estimated ejection fraction of 30 - 40%.  Right Ventricle: Mild right ventricular enlargement.  Left Atrium: Left atrium is moderately dilated.  Right Atrium: Right atrium is severely dilated.  Mitral Valve: There is moderate regurgitation.  Tricuspid Valve: There is mild to moderate regurgitation.  Pulmonary Artery: Pulmonary artery pressure could not be estimated.  IVC/SVC: Patient is ventilated, cannot use IVC diameter to estimate right atrial pressure.    Echocardiogram (1.31.24):   Left Ventricle: The left ventricle is normal in size. Mildly increased wall thickness. There is reduced systolic function. Biplane (2D) method of discs ejection fraction is 41%. Unable to assess diastolic function due to atrial fibrillation.  Right Ventricle: Normal right ventricular cavity size. Systolic function is mildly reduced.  Left Atrium: Left atrium is severely dilated.  Right Atrium: Right atrium is severely dilated.  Aortic Valve: The aortic valve is a trileaflet valve.  Mitral Valve: There is bileaflet sclerosis. There is moderate to severe regurgitation.  Tricuspid Valve: There is mild regurgitation.  IVC/SVC: Normal venous pressure at 3 mmHg.    Carotid US (2.7.23):  The study quality is average.   1-39% stenosis in the proximal right internal carotid artery based on Bluth Criteria. Antegrade right vertebral artery flow.   1-39% stenosis in the proximal left internal carotid artery based on Bluth Criteria. Antegrade left vertebral artery flow.     Echocardiogram (11.8.22):  The study quality is average.   The left ventricle is moderately enlarged. Left ventricular diastolic dimension is 6.4 cms. Global left ventricular systolic function is moderately decreased. The left ventricular ejection fraction is 40%. Arrhythmia noted throughout much of the exam.   There is no evidence of mitral stenosis or prolapse appreciated. MV Ortiz score ~ 5. The area by planimetry is 5.3 cm². The mean trans mitral gradient  is 1.6 mmHg. Suspect borderline severe (4+) mitral regurgitation with a posteriorly directed jet. MR PISA radius ~ 0.93 cm, MR ERO ~ 0.35 cm^2, MR regurgitant volume ~ 72 ml/beat, MR regurgitant fraction ~ 55%, MR vena contracta ~ 0.54 cm.  Mild calcification of the aortic valve is noted with adequate cuspal excursion.   Trace pulmonic regurgitation. Mild (1+) tricuspid regurgitation.    Peripheral Angiogram (10.17.22):  Underwent Successful CSI Atherectomy Balloon Angioplasty and Stenting of the Right SFA and CSI Atherectomy Balloon Angioplasty of Right Anterior Tibial Artery Using Pedal Approach.    Peripheral Angiogram (9.12.22):  Underwent Successful Left SFA Laser Atherectomy Balloon Angioplasty Using Left Radial Artery Approach.    Review of Systems   Constitutional: Positive for malaise/fatigue. Negative for fever.   Cardiovascular:  Negative for chest pain and leg swelling.   Respiratory:  Negative for shortness of breath.    Skin:         Scrotal Wound   All other systems reviewed and are negative.    Objective:     Vital Signs (Most Recent):  Temp: 97.8 °F (36.6 °C) (03/31/24 0805)  Pulse: 76 (03/31/24 0843)  Resp: 18 (03/31/24 0805)  BP: 123/76 (03/31/24 0843)  SpO2: 98 % (03/31/24 0805) Vital Signs (24h Range):  Temp:  [97.6 °F (36.4 °C)-98.8 °F (37.1 °C)] 97.8 °F (36.6 °C)  Pulse:  [68-97] 76  Resp:  [18-25] 18  SpO2:  [92 %-98 %] 98 %  BP: (101-143)/(65-79) 123/76   Weight: 95 kg (209 lb 7 oz)  Body mass index is 29.21 kg/m².  SpO2: 98 %       Intake/Output Summary (Last 24 hours) at 3/31/2024 0907  Last data filed at 3/31/2024 0559  Gross per 24 hour   Intake --   Output 2000 ml   Net -2000 ml       Lines/Drains/Airways       Drain  Duration                  Urethral Catheter 03/18/24 1426 Straight-tip 16 Fr. 12 days              Peripheral Intravenous Line  Duration                  Peripheral IV - Single Lumen 03/24/24 1634 18 G Anterior;Left Forearm 6 days                  Significant Labs:    Recent Results (from the past 72 hour(s))   Echo    Collection Time: 03/28/24 11:43 AM   Result Value Ref Range    BSA 2.12 m2    Quintana's Biplane MOD Ejection Fraction 46 %    LVIDd 5.35 3.5 - 6.0 cm    LV Systolic Volume 91.00 mL    LV Systolic Volume Index 42.3 mL/m2    LVIDs 4.47 (A) 2.1 - 4.0 cm    LV Diastolic Volume 138.00 mL    LV Diastolic Volume Index 64.19 mL/m2    IVS 1.81 (A) 0.6 - 1.1 cm    FS 16 (A) 28 - 44 %    Left Ventricle Relative Wall Thickness 0.59 cm    Posterior Wall 1.58 (A) 0.6 - 1.1 cm    LV mass 428.66 g    LV Mass Index 199 g/m2    RVDD 3.07 cm    Vn Nyquist MS 0.35 m/s    Radius 1.20 cm    Vn Nyquist 0.35 m/s    Mr max trevon 3.48 m/s    MR PISA EROA 0.91 cm2    ZLVIDS 0.40     ZLVIDD -2.66    RT Blood Gas    Collection Time: 03/28/24 11:00 PM   Result Value Ref Range    Sample Type Arterial Blood     Sample site Right Radial Artery     Drawn by rcl crt     pH, Blood gas 7.420 7.350 - 7.450    pCO2, Blood gas 40.0 35.0 - 45.0 mmHg    pO2, Blood gas 51.0 (LL) 80.0 - 100.0 mmHg    Sodium, Blood Gas 136 (L) 137 - 145 mmol/L    Potassium, Blood Gas 4.5 3.5 - 5.0 mmol/L    Calcium Level Ionized 1.16 1.12 - 1.23 mmol/L    TOC2, Blood gas 27.1 mmol/L    Base Excess, Blood gas 1.30 -2.00 - 2.00 mmol/L    sO2, Blood gas 86.4 %    HCO3, Blood gas 25.9 22.0 - 26.0 mmol/L    THb, Blood gas 9.4 (L) 12 - 16 g/dL    O2 Hb, Blood Gas 84.0 (L) 94.0 - 97.0 %    CO Hgb 2.0 (H) 0.5 - 1.5 %    Met Hgb 0.8 0.4 - 1.5 %    Allens Test Yes     Oxygen Device, Blood gas Oxy Mask     LPM 2    EKG 12-lead    Collection Time: 03/29/24 12:12 AM   Result Value Ref Range    QRS Duration 88 ms    [facility] QTC Calculation 450 ms   Comprehensive metabolic panel    Collection Time: 03/29/24 12:40 AM   Result Value Ref Range    Sodium Level 142 136 - 145 mmol/L    Potassium Level 4.7 3.5 - 5.1 mmol/L    Chloride 106 98 - 107 mmol/L    Carbon Dioxide 24 23 - 31 mmol/L    Glucose Level 106 82 - 115 mg/dL    Blood Urea Nitrogen  14.9 8.4 - 25.7 mg/dL    Creatinine 1.03 0.73 - 1.18 mg/dL    Calcium Level Total 8.9 8.8 - 10.0 mg/dL    Protein Total 6.8 5.8 - 7.6 gm/dL    Albumin Level 2.8 (L) 3.4 - 4.8 g/dL    Globulin 4.0 (H) 2.4 - 3.5 gm/dL    Albumin/Globulin Ratio 0.7 (L) 1.1 - 2.0 ratio    Bilirubin Total 0.8 <=1.5 mg/dL    Alkaline Phosphatase 80 40 - 150 unit/L    Alanine Aminotransferase 8 0 - 55 unit/L    Aspartate Aminotransferase 20 5 - 34 unit/L    eGFR >60 mls/min/1.73/m2   Magnesium    Collection Time: 03/29/24 12:40 AM   Result Value Ref Range    Magnesium Level 2.00 1.60 - 2.60 mg/dL   Phosphorus    Collection Time: 03/29/24 12:40 AM   Result Value Ref Range    Phosphorus Level 4.1 2.3 - 4.7 mg/dL   Troponin I    Collection Time: 03/29/24 12:40 AM   Result Value Ref Range    Troponin-I 0.028 0.000 - 0.045 ng/mL   CBC with Differential    Collection Time: 03/29/24 12:40 AM   Result Value Ref Range    WBC 10.55 4.50 - 11.50 x10(3)/mcL    RBC 3.02 (L) 4.70 - 6.10 x10(6)/mcL    Hgb 9.2 (L) 14.0 - 18.0 g/dL    Hct 29.5 (L) 42.0 - 52.0 %    MCV 97.7 (H) 80.0 - 94.0 fL    MCH 30.5 27.0 - 31.0 pg    MCHC 31.2 (L) 33.0 - 36.0 g/dL    RDW 17.2 (H) 11.5 - 17.0 %    Platelet 380 130 - 400 x10(3)/mcL    MPV 10.0 7.4 - 10.4 fL    Neut % 82.1 %    Lymph % 10.0 %    Mono % 6.3 %    Eos % 0.5 %    Basophil % 0.5 %    Lymph # 1.05 0.6 - 4.6 x10(3)/mcL    Neut # 8.68 2.1 - 9.2 x10(3)/mcL    Mono # 0.66 0.1 - 1.3 x10(3)/mcL    Eos # 0.05 0 - 0.9 x10(3)/mcL    Baso # 0.05 <=0.2 x10(3)/mcL    IG# 0.06 (H) 0 - 0.04 x10(3)/mcL    IG% 0.6 %    NRBC% 0.0 %   RT Blood Gas    Collection Time: 03/29/24  1:25 AM   Result Value Ref Range    Sample Type Arterial Blood     Sample site Right Radial Artery     Drawn by rcl crt     pH, Blood gas 7.430 7.350 - 7.450    pCO2, Blood gas 38.0 35.0 - 45.0 mmHg    pO2, Blood gas 65.0 (L) 80.0 - 100.0 mmHg    Sodium, Blood Gas 137 137 - 145 mmol/L    Potassium, Blood Gas 4.0 3.5 - 5.0 mmol/L    Calcium Level Ionized  1.13 1.12 - 1.23 mmol/L    TOC2, Blood gas 26.4 mmol/L    Base Excess, Blood gas 0.90 -2.00 - 2.00 mmol/L    sO2, Blood gas 93.0 %    HCO3, Blood gas 25.2 22.0 - 26.0 mmol/L    THb, Blood gas 9.3 (L) 12 - 16 g/dL    O2 Hb, Blood Gas 91.9 (L) 94.0 - 97.0 %    CO Hgb 1.9 (H) 0.5 - 1.5 %    Met Hgb 0.6 0.4 - 1.5 %    Allens Test Yes     MODE BiPAP     FIO2, Blood gas 40 %    BiPAP (I) 12 cm H2O    BiPAP (E) 5 cm H2O   Respiratory Panel    Collection Time: 03/29/24  1:26 AM   Result Value Ref Range    Adenovirus Not Detected Not Detected    Coronavirus 229E Not Detected Not Detected    Coronavirus HKU1 Not Detected Not Detected    Coronavirus NL63 Not Detected Not Detected    Coronavirus OC43 PCR, Common Cold Virus Not Detected Not Detected    Human Metapneumovirus Not Detected Not Detected    Parainfluenza Virus 1 Not Detected Not Detected    Parainfluenza Virus 2 Not Detected Not Detected    Parainfluenza Virus 3 Not Detected Not Detected    Parainfluenza Virus 4 Not Detected Not Detected    Bordetella pertussis (ptxP) Not Detected Not Detected    Chlamydia pneumoniae Not Detected Not Detected    Mycoplasma pneumoniae Not Detected Not Detected    Human Rhinovirus/Enterovirus Not Detected Not Detected    Bordetella parapertussis (VS9990) Not Detected Not Detected   MRSA PCR    Collection Time: 03/29/24  1:26 AM   Result Value Ref Range    MRSA PCR SCRN ([facility]) Not Detected Not Detected   Triglycerides    Collection Time: 03/29/24  5:02 AM   Result Value Ref Range    Triglyceride 46 34 - 140 mg/dL   Triglycerides    Collection Time: 03/30/24  4:20 AM   Result Value Ref Range    Triglyceride 41 34 - 140 mg/dL   Magnesium    Collection Time: 03/30/24  4:20 AM   Result Value Ref Range    Magnesium Level 2.00 1.60 - 2.60 mg/dL   Basic Metabolic Panel    Collection Time: 03/30/24  4:20 AM   Result Value Ref Range    Sodium Level 140 136 - 145 mmol/L    Potassium Level 3.7 3.5 - 5.1 mmol/L    Chloride 104 98 - 107 mmol/L     Carbon Dioxide 24 23 - 31 mmol/L    Glucose Level 166 (H) 82 - 115 mg/dL    Blood Urea Nitrogen 24.2 8.4 - 25.7 mg/dL    Creatinine 1.02 0.73 - 1.18 mg/dL    BUN/Creatinine Ratio 24     Calcium Level Total 9.0 8.8 - 10.0 mg/dL    Anion Gap 12.0 mEq/L    eGFR >60 mls/min/1.73/m2   CBC with Differential    Collection Time: 03/30/24  4:20 AM   Result Value Ref Range    WBC 13.71 (H) 4.50 - 11.50 x10(3)/mcL    RBC 2.91 (L) 4.70 - 6.10 x10(6)/mcL    Hgb 8.9 (L) 14.0 - 18.0 g/dL    Hct 27.7 (L) 42.0 - 52.0 %    MCV 95.2 (H) 80.0 - 94.0 fL    MCH 30.6 27.0 - 31.0 pg    MCHC 32.1 (L) 33.0 - 36.0 g/dL    RDW 16.9 11.5 - 17.0 %    Platelet 339 130 - 400 x10(3)/mcL    MPV 10.3 7.4 - 10.4 fL    Neut % 95.1 %    Lymph % 2.6 %    Mono % 1.9 %    Eos % 0.0 %    Basophil % 0.1 %    Lymph # 0.35 (L) 0.6 - 4.6 x10(3)/mcL    Neut # 13.05 (H) 2.1 - 9.2 x10(3)/mcL    Mono # 0.26 0.1 - 1.3 x10(3)/mcL    Eos # 0.00 0 - 0.9 x10(3)/mcL    Baso # 0.01 <=0.2 x10(3)/mcL    IG# 0.04 0 - 0.04 x10(3)/mcL    IG% 0.3 %    NRBC% 0.0 %   Triglycerides    Collection Time: 03/31/24  4:10 AM   Result Value Ref Range    Triglyceride 58 34 - 140 mg/dL   Basic Metabolic Panel    Collection Time: 03/31/24  4:10 AM   Result Value Ref Range    Sodium Level 142 136 - 145 mmol/L    Potassium Level 3.4 (L) 3.5 - 5.1 mmol/L    Chloride 103 98 - 107 mmol/L    Carbon Dioxide 25 23 - 31 mmol/L    Glucose Level 169 (H) 82 - 115 mg/dL    Blood Urea Nitrogen 29.2 (H) 8.4 - 25.7 mg/dL    Creatinine 1.09 0.73 - 1.18 mg/dL    BUN/Creatinine Ratio 27     Calcium Level Total 8.9 8.8 - 10.0 mg/dL    Anion Gap 14.0 mEq/L    eGFR >60 mls/min/1.73/m2   Magnesium    Collection Time: 03/31/24  4:10 AM   Result Value Ref Range    Magnesium Level 2.00 1.60 - 2.60 mg/dL     Telemetry: PAF/CVR    Physical Exam  Vitals reviewed.   Constitutional:       General: He is not in acute distress.     Appearance: Normal appearance. He is ill-appearing.   HENT:      Head: Normocephalic.       Mouth/Throat:      Mouth: Mucous membranes are moist.   Eyes:      Conjunctiva/sclera: Conjunctivae normal.   Cardiovascular:      Rate and Rhythm: Normal rate. Rhythm irregular.      Heart sounds: Murmur heard.      Comments: Right wrist soft, nontender. No hematoma noted. + 2 peripheral pulse  Pulmonary:      Effort: Pulmonary effort is normal. No respiratory distress.      Breath sounds: Decreased breath sounds present. No rales.      Comments: 2 L NC  Abdominal:      Palpations: Abdomen is soft.   Musculoskeletal:         General: No swelling.      Right lower leg: No edema.      Left lower leg: No edema.   Neurological:      General: No focal deficit present.      Mental Status: He is alert and oriented to person, place, and time.   Psychiatric:         Mood and Affect: Mood normal.         Behavior: Behavior normal.         Judgment: Judgment normal.       Current Inpatient Medications:    Current Facility-Administered Medications:     0.9%  NaCl infusion (for blood administration), , Intravenous, Q24H PRN, Carmen Griggs FNP    0.9%  NaCl infusion (for blood administration), , Intravenous, Q24H PRN, Joshua Hernandez ANP    acetaminophen tablet 650 mg, 650 mg, Oral, Q8H PRN, Harris Hernandez MD    albuterol-ipratropium 2.5 mg-0.5 mg/3 mL nebulizer solution 3 mL, 3 mL, Nebulization, Q4H, Natali Seymour MD, 3 mL at 03/31/24 0753    amiodarone tablet 200 mg, 200 mg, Oral, BID, Davida Mo FNP, 200 mg at 03/31/24 0843    [START ON 4/3/2024] amiodarone tablet 200 mg, 200 mg, Oral, Daily, Davida Mo FNP    aspirin EC tablet 81 mg, 81 mg, Oral, Daily, Jori Santos MD, 81 mg at 03/31/24 0842    atorvastatin tablet 80 mg, 80 mg, Oral, Daily, Cassidy Obrien MD, 80 mg at 03/31/24 0844    chlorhexidine 0.12 % solution 15 mL, 15 mL, Mouth/Throat, BID, David Gonsalez MD, 15 mL at 03/31/24 0842    dextrose 10 % infusion, , Intravenous, PRN, David Gonsalez MD    dextrose 10 % infusion, ,  Intravenous, PRN, David Gonsalez MD    dextrose 10% bolus 125 mL 125 mL, 12.5 g, Intravenous, PRN, David Gonsalez MD    dextrose 10% bolus 250 mL 250 mL, 25 g, Intravenous, PRN, David Gonsalez MD    doxycycline tablet 100 mg, 100 mg, Oral, Q12H, Jori Santos MD, 100 mg at 03/31/24 0842    enoxaparin injection 90 mg, 1 mg/kg, Subcutaneous, Q12H (prophylaxis, 0900/2100), Natali Seymour MD, 90 mg at 03/31/24 0842    folic acid tablet 1 mg, 1 mg, Oral, Daily, Jenna Miller MD, 1 mg at 03/31/24 0844    furosemide injection 40 mg, 40 mg, Intravenous, Daily, Jori Santos MD, 40 mg at 03/31/24 0843    hydrALAZINE injection 10 mg, 10 mg, Intravenous, Q4H PRN, Adriano Montiel MD    insulin aspart U-100 injection 0-10 Units, 0-10 Units, Subcutaneous, Q6H PRN, Narendra Pradhan DO    ipratropium 0.02 % nebulizer solution 0.5 mg, 0.5 mg, Nebulization, Q6H PRN, David Gonsalez MD    LORazepam tablet 2 mg, 2 mg, Oral, Q4H PRN, Jenna Miller MD, 2 mg at 03/29/24 2132    melatonin tablet 6 mg, 6 mg, Oral, Nightly PRN, Harris Hernandez MD, 6 mg at 03/30/24 2048    methylPREDNISolone sodium succinate injection 80 mg, 80 mg, Intravenous, Q12H, Harris Hernandez MD, 80 mg at 03/31/24 0603    metoprolol succinate (TOPROL-XL) 24 hr split tablet 12.5 mg, 12.5 mg, Oral, Daily, Davida Mo FNP, 12.5 mg at 03/31/24 0843    midodrine tablet 10 mg, 10 mg, Oral, TID WM, Narendra Pradhan DO, 10 mg at 03/31/24 0844    morphine injection 2 mg, 2 mg, Intravenous, Q6H PRN, David Gonsalez MD, 2 mg at 03/29/24 0959    multivitamin tablet, 1 tablet, Oral, Daily, Jenna Miller MD, 1 tablet at 03/31/24 0842    ondansetron injection 4 mg, 4 mg, Intravenous, Q8H PRN, Adriano Montiel MD    oxyCODONE immediate release tablet 5 mg, 5 mg, Oral, Q4H PRN, Harris Hernandez MD, 5 mg at 03/30/24 0600    pantoprazole injection 40 mg, 40 mg, Intravenous, BID, Cassidy Obrien MD, 40 mg at 03/31/24 0843     phenazopyridine tablet 100 mg, 100 mg, Oral, TID WM, Jori Santos MD, 100 mg at 03/31/24 0842    piperacillin-tazobactam (ZOSYN) 4.5 g in dextrose 5 % in water (D5W) 100 mL IVPB (MB+), 4.5 g, Intravenous, Q8H, Jori Santos MD, Last Rate: 25 mL/hr at 03/31/24 0604, 4.5 g at 03/31/24 0604    potassium chloride SA CR tablet 20 mEq, 20 mEq, Oral, Once, Davida Mo, FNP    potassium chloride SA CR tablet 40 mEq, 40 mEq, Oral, Once, Davida Mo FNP    sodium chloride 0.9% flush 10 mL, 10 mL, Intravenous, PRN, Narendra Pradhan DO, 10 mL at 03/29/24 2206    sodium chloride 0.9% flush 10 mL, 10 mL, Intravenous, PRN, David Gonsalez MD    Facility-Administered Medications Ordered in Other Encounters:     0.9%  NaCl infusion, , Intravenous, Continuous, Shannon Milligan MD, Last Rate: 10 mL/hr at 03/09/23 1020, New Bag at 03/09/23 1020    diphenhydrAMINE injection 25 mg, 25 mg, Intravenous, Once PRN, Shannon Milligan MD    LIDOcaine (PF) 10 mg/ml (1%) injection 10 mg, 1 mL, Intradermal, Once, Lanette Ulloa FNP    LIDOcaine (PF) 10 mg/ml (1%) injection 10 mg, 1 mL, Intradermal, Once, Shannon Milligan MD    ondansetron injection 4 mg, 4 mg, Intravenous, Once, Shannon Milligan MD    prochlorperazine injection Soln 5 mg, 5 mg, Intravenous, Once PRN, Shannon Milligan MD  VTE Risk Mitigation (From admission, onward)           Ordered     enoxaparin injection 90 mg  Every 12 hours         03/24/24 0901     IP VTE LOW RISK PATIENT  Once         03/19/24 0422     Place sequential compression device  Until discontinued         03/18/24 0124                  Assessment:   Cardiac Arrest/VT (Post Operative Left Héctor Orchiectomy - in PACU 3.18.24)     - Requiring Multiple Defibrillations (x 3)  NSTEMI Type 2 in the Setting of Cardiac Arrest, Anemia  Anemia - Stable    - Cleared by GI for DAPT/AC    - Requiring Transfusion    - EGD (3.22.24) - LA Grade B Reflux Esophagitis with No Bleeding,  Chronic Gastritis, Protonix; Colonoscopy as OP 2/2 Scrotal Wound  Chronic Atrial Fibrillation - Now AF CVR (HR 50's-60s with Intermittent PVCS)    - Xarelto on Hold Post Scrotal Abscess I&D (On FD Lovenox)  Non Ischemic Cardiomyopathy - Recovered LVEF    - EF 30-40%    - EF   Nonobstructive CAD    - [facility] 3.26.24: Left main 10-20%, LAD 50%  Acute Hypoxemic Respiratory Failure requiring Intubation/Ventilation - Now Extubated on Supplemental O2  Hypotension Requiring Vasopressor Support - Resolved     - History of Hypertension  Valvular Heart Disease    - MR: Moderate, TR: Mild to Moderate  Hyperlipidemia    - On Statin  SIRS - Likely UTI Related - Resolved     - BC x 2 Negative at 5 Days  Scrotal Abscess    - Status Post Surgical Exploration, Left Orchiectomy, & Debridement of Wound/Wound Packing (3.18.24)   COPD  Partial Bowel Obstruction - Resolved   Long Term Oral Anticoagulation  PAD  Infrarenal Abdominal Aortic Aneurysmal Dilatation with Mural Thrombus (Stable)    - Maximum diameter is 3.6 cm without significant interval change   Acute Human Metapneumovirus Infection (On Droplet Precautions) - Off Precautions    Plan:   Echo from 3.27.24 reviewed with Dr. Rosado. No need for further testing.   Continue PO amio load.   GI Cleared PT for DAPT/AC. Continue ASA 81 mg daily. Continue FD lovenox for CVA prophylaxis in the setting of PAF. Resume Xarelto prior to discharge.   Continue Toprol XL 12.5 mg daily.   Continue IV lasix 40 mg IVP daily. Possibly transition to PO lasix tomorrow.   Ensure accurate I&O's and daily weights.   Wean O2 as tolerated.   Antibiotic Management/Wound Care as per Primary Team  ECHO reviewed. Does not qualify for lifevest.   Keep K > 4 & Mg > 2. Replete MALI Rea  Cardiology  [facility]  03/31/2024

## 2024-04-01 LAB
ANION GAP SERPL CALC-SCNC: 10 MEQ/L
BASOPHILS # BLD AUTO: 0.01 X10(3)/MCL
BASOPHILS NFR BLD AUTO: 0.1 %
BUN SERPL-MCNC: 32.1 MG/DL (ref 8.4–25.7)
CALCIUM SERPL-MCNC: 8.8 MG/DL (ref 8.8–10)
CHLORIDE SERPL-SCNC: 103 MMOL/L (ref 98–107)
CO2 SERPL-SCNC: 28 MMOL/L (ref 23–31)
CREAT SERPL-MCNC: 1 MG/DL (ref 0.73–1.18)
CREAT/UREA NIT SERPL: 32
EOSINOPHIL # BLD AUTO: 0.01 X10(3)/MCL (ref 0–0.9)
EOSINOPHIL NFR BLD AUTO: 0.1 %
ERYTHROCYTE [DISTWIDTH] IN BLOOD BY AUTOMATED COUNT: 17.1 % (ref 11.5–17)
GFR SERPLBLD CREATININE-BSD FMLA CKD-EPI: >60 MLS/MIN/1.73/M2
GLUCOSE SERPL-MCNC: 155 MG/DL (ref 82–115)
HCT VFR BLD AUTO: 28.7 % (ref 42–52)
HGB BLD-MCNC: 9.1 G/DL (ref 14–18)
IMM GRANULOCYTES # BLD AUTO: 0.06 X10(3)/MCL (ref 0–0.04)
IMM GRANULOCYTES NFR BLD AUTO: 0.5 %
LYMPHOCYTES # BLD AUTO: 0.24 X10(3)/MCL (ref 0.6–4.6)
LYMPHOCYTES NFR BLD AUTO: 2 %
MAGNESIUM SERPL-MCNC: 1.9 MG/DL (ref 1.6–2.6)
MCH RBC QN AUTO: 30.3 PG (ref 27–31)
MCHC RBC AUTO-ENTMCNC: 31.7 G/DL (ref 33–36)
MCV RBC AUTO: 95.7 FL (ref 80–94)
MONOCYTES # BLD AUTO: 0.4 X10(3)/MCL (ref 0.1–1.3)
MONOCYTES NFR BLD AUTO: 3.3 %
NEUTROPHILS # BLD AUTO: 11.24 X10(3)/MCL (ref 2.1–9.2)
NEUTROPHILS NFR BLD AUTO: 94 %
NRBC BLD AUTO-RTO: 0 %
PLATELET # BLD AUTO: 295 X10(3)/MCL (ref 130–400)
PMV BLD AUTO: 10.5 FL (ref 7.4–10.4)
POTASSIUM SERPL-SCNC: 4 MMOL/L (ref 3.5–5.1)
RBC # BLD AUTO: 3 X10(6)/MCL (ref 4.7–6.1)
SODIUM SERPL-SCNC: 141 MMOL/L (ref 136–145)
TRIGL SERPL-MCNC: 55 MG/DL (ref 34–140)
WBC # SPEC AUTO: 11.96 X10(3)/MCL (ref 4.5–11.5)

## 2024-04-01 PROCEDURE — C9113 INJ PANTOPRAZOLE SODIUM, VIA: HCPCS | Performed by: INTERNAL MEDICINE

## 2024-04-01 PROCEDURE — 25000003 PHARM REV CODE 250: Performed by: STUDENT IN AN ORGANIZED HEALTH CARE EDUCATION/TRAINING PROGRAM

## 2024-04-01 PROCEDURE — 80048 BASIC METABOLIC PNL TOTAL CA: CPT

## 2024-04-01 PROCEDURE — 25000003 PHARM REV CODE 250: Performed by: INTERNAL MEDICINE

## 2024-04-01 PROCEDURE — 63600175 PHARM REV CODE 636 W HCPCS: Performed by: INTERNAL MEDICINE

## 2024-04-01 PROCEDURE — 25000242 PHARM REV CODE 250 ALT 637 W/ HCPCS: Performed by: INTERNAL MEDICINE

## 2024-04-01 PROCEDURE — 84478 ASSAY OF TRIGLYCERIDES: CPT

## 2024-04-01 PROCEDURE — 99900035 HC TECH TIME PER 15 MIN (STAT)

## 2024-04-01 PROCEDURE — 27000221 HC OXYGEN, UP TO 24 HOURS

## 2024-04-01 PROCEDURE — 63600175 PHARM REV CODE 636 W HCPCS

## 2024-04-01 PROCEDURE — 83735 ASSAY OF MAGNESIUM: CPT

## 2024-04-01 PROCEDURE — 25000003 PHARM REV CODE 250

## 2024-04-01 PROCEDURE — 94760 N-INVAS EAR/PLS OXIMETRY 1: CPT

## 2024-04-01 PROCEDURE — 94640 AIRWAY INHALATION TREATMENT: CPT

## 2024-04-01 PROCEDURE — 85025 COMPLETE CBC W/AUTO DIFF WBC: CPT

## 2024-04-01 PROCEDURE — 21400001 HC TELEMETRY ROOM

## 2024-04-01 RX ORDER — MAGNESIUM SULFATE 1 G/100ML
1 INJECTION INTRAVENOUS ONCE
Status: COMPLETED | OUTPATIENT
Start: 2024-04-01 | End: 2024-04-01

## 2024-04-01 RX ORDER — PREDNISONE 20 MG/1
40 TABLET ORAL 2 TIMES DAILY
Status: DISCONTINUED | OUTPATIENT
Start: 2024-04-01 | End: 2024-04-03

## 2024-04-01 RX ORDER — FUROSEMIDE 40 MG/1
40 TABLET ORAL DAILY
Status: DISCONTINUED | OUTPATIENT
Start: 2024-04-02 | End: 2024-04-10

## 2024-04-01 RX ADMIN — RIVAROXABAN 20 MG: 10 TABLET, FILM COATED ORAL at 05:04

## 2024-04-01 RX ADMIN — METHYLPREDNISOLONE SODIUM SUCCINATE 80 MG: 40 INJECTION, POWDER, FOR SOLUTION INTRAMUSCULAR; INTRAVENOUS at 05:04

## 2024-04-01 RX ADMIN — AMIODARONE HYDROCHLORIDE 200 MG: 200 TABLET ORAL at 08:04

## 2024-04-01 RX ADMIN — AMOXICILLIN AND CLAVULANATE POTASSIUM 1 TABLET: 875; 125 TABLET, FILM COATED ORAL at 02:04

## 2024-04-01 RX ADMIN — MORPHINE SULFATE 2 MG: 4 INJECTION, SOLUTION INTRAMUSCULAR; INTRAVENOUS at 02:04

## 2024-04-01 RX ADMIN — IPRATROPIUM BROMIDE AND ALBUTEROL SULFATE 3 ML: 2.5; .5 SOLUTION RESPIRATORY (INHALATION) at 10:04

## 2024-04-01 RX ADMIN — MORPHINE SULFATE 2 MG: 4 INJECTION, SOLUTION INTRAMUSCULAR; INTRAVENOUS at 09:04

## 2024-04-01 RX ADMIN — PANTOPRAZOLE SODIUM 40 MG: 40 INJECTION, POWDER, FOR SOLUTION INTRAVENOUS at 08:04

## 2024-04-01 RX ADMIN — IPRATROPIUM BROMIDE AND ALBUTEROL SULFATE 3 ML: 2.5; .5 SOLUTION RESPIRATORY (INHALATION) at 04:04

## 2024-04-01 RX ADMIN — DOXYCYCLINE HYCLATE 100 MG: 100 TABLET, COATED ORAL at 09:04

## 2024-04-01 RX ADMIN — IPRATROPIUM BROMIDE AND ALBUTEROL SULFATE 3 ML: 2.5; .5 SOLUTION RESPIRATORY (INHALATION) at 12:04

## 2024-04-01 RX ADMIN — PHENAZOPYRIDINE HYDROCHLORIDE 100 MG: 100 TABLET ORAL at 08:04

## 2024-04-01 RX ADMIN — MAGNESIUM SULFATE IN DEXTROSE 1 G: 10 INJECTION, SOLUTION INTRAVENOUS at 12:04

## 2024-04-01 RX ADMIN — AMIODARONE HYDROCHLORIDE 200 MG: 200 TABLET ORAL at 09:04

## 2024-04-01 RX ADMIN — METOPROLOL SUCCINATE 12.5 MG: 25 TABLET, EXTENDED RELEASE ORAL at 08:04

## 2024-04-01 RX ADMIN — ENOXAPARIN SODIUM 90 MG: 100 INJECTION SUBCUTANEOUS at 08:04

## 2024-04-01 RX ADMIN — PREDNISONE 40 MG: 20 TABLET ORAL at 09:04

## 2024-04-01 RX ADMIN — AMOXICILLIN AND CLAVULANATE POTASSIUM 1 TABLET: 875; 125 TABLET, FILM COATED ORAL at 05:04

## 2024-04-01 RX ADMIN — PHENAZOPYRIDINE HYDROCHLORIDE 100 MG: 100 TABLET ORAL at 05:04

## 2024-04-01 RX ADMIN — PANTOPRAZOLE SODIUM 40 MG: 40 INJECTION, POWDER, FOR SOLUTION INTRAVENOUS at 09:04

## 2024-04-01 RX ADMIN — DOXYCYCLINE HYCLATE 100 MG: 100 TABLET, COATED ORAL at 08:04

## 2024-04-01 RX ADMIN — FOLIC ACID 1 MG: 1 TABLET ORAL at 08:04

## 2024-04-01 RX ADMIN — ATORVASTATIN CALCIUM 80 MG: 40 TABLET, FILM COATED ORAL at 08:04

## 2024-04-01 RX ADMIN — MIDODRINE HYDROCHLORIDE 10 MG: 5 TABLET ORAL at 08:04

## 2024-04-01 RX ADMIN — CHLORHEXIDINE GLUCONATE 0.12% ORAL RINSE 15 ML: 1.2 LIQUID ORAL at 09:04

## 2024-04-01 RX ADMIN — THERA TABS 1 TABLET: TAB at 08:04

## 2024-04-01 RX ADMIN — ASPIRIN 81 MG: 81 TABLET, COATED ORAL at 08:04

## 2024-04-01 RX ADMIN — IPRATROPIUM BROMIDE AND ALBUTEROL SULFATE 3 ML: 2.5; .5 SOLUTION RESPIRATORY (INHALATION) at 08:04

## 2024-04-01 RX ADMIN — CHLORHEXIDINE GLUCONATE 0.12% ORAL RINSE 15 ML: 1.2 LIQUID ORAL at 08:04

## 2024-04-01 RX ADMIN — AMOXICILLIN AND CLAVULANATE POTASSIUM 1 TABLET: 875; 125 TABLET, FILM COATED ORAL at 09:04

## 2024-04-01 RX ADMIN — FUROSEMIDE 40 MG: 10 INJECTION, SOLUTION INTRAMUSCULAR; INTRAVENOUS at 08:04

## 2024-04-01 RX ADMIN — MIDODRINE HYDROCHLORIDE 10 MG: 5 TABLET ORAL at 12:04

## 2024-04-01 RX ADMIN — MIDODRINE HYDROCHLORIDE 10 MG: 5 TABLET ORAL at 05:04

## 2024-04-01 NOTE — PROGRESS NOTES
Hospital Medicine  Progress Note    Patient Name: Ran Reyes Jr.  MRN: [id number]  Status: IP- Inpatient   Admission Date: 3/17/2024  Length of Stay: 14  Date of Service: 04/01/2024       CC: hospital follow-up for respiratory failure       SUBJECTIVE   68-year-old male with a history that includes VHD, nonischemic cardiomyopathy (with an EF of 41%),  chronic AFib on Xarelto, peripheral arterial disease, COPD, and a large left testicle hydrocele, presented to the ED 3/17 complaining of what he thought was rectal bleeding as he was noting blood in the toilet and running down his legs.  Ultimately was found that the bleeding was coming from the posterior aspect of his scrotum from a superficial scrotal ulceration.  He also complained of persistent testicular swelling and pain.  Doppler ultrasound was significantly limited, but could not rule out the presence of torsion.  Urology was consulted and they made decision to proceed with emergent scrotal exploration.  While still in PACU, patient was hypotensive with blood persistently 80s/50s, this despite fluid resuscitation.   Patient subsequently transferred to the ICU for vasopressor support.  Additionally developed cardiac arrest with ventricular tachycardia requiring defibrillation x3 (03/18/2024), with ROSC.   He was continued on IV antibiotics, and eventually weaned off pressors.  Additionally FOB noted to be positive.  GI consulted and planned for EGD.  Cardiology involved as well, with plans for Ischemic evaluation.  Cleared for AC and started on FD lovenox.  Underwent EGD 3/22 noting grade B reflux esophagitis, chronic gastritis, chronic duodenitis.  GI considering outpatient colonoscopy, recommended Carafate for 7 days and continued PPI BID for a month on discharge and then daily for 3 months.  Cardiology on board, awaiting ischemic evaluation.  Urology continued to follow.  Patient continued on IV antibiotics; working with therapy.  On  evening of 3/28 he  developed worsening hypoxemia.  CXR noted bilateral scattered opacities, and he was requiring BiPAP for oxygenation.  Antibiotics broadened, and he was initiated on IV Lasix.  He subsequently diuresed well (a negative fluid balance >5L over 48hrs).  Subsequently transitioned off BiPAP back to supplemental oxygen, and remained stable.     Today: Patient seen and examined at bedside, and chart reviewed.  Oxygenation requirement down to 1-2L;renal function stable.      MEDICATIONS   Scheduled   albuterol-ipratropium  3 mL Nebulization Q4H    amiodarone  200 mg Oral BID    [START ON 4/3/2024] amiodarone  200 mg Oral Daily    amoxicillin-clavulanate 875-125mg  1 tablet Oral Q8H    aspirin  81 mg Oral Daily    atorvastatin  80 mg Oral Daily    chlorhexidine  15 mL Mouth/Throat BID    doxycycline  100 mg Oral Q12H    enoxparin  1 mg/kg Subcutaneous Q12H (prophylaxis, 0900/2100)    folic acid  1 mg Oral Daily    [START ON 4/2/2024] furosemide  40 mg Oral Daily    magnesium sulfate IVPB  1 g Intravenous Once    methylPREDNISolone sodium succinate injection  80 mg Intravenous Q12H    metoprolol succinate  12.5 mg Oral Daily    midodrine  10 mg Oral TID WM    multivitamin  1 tablet Oral Daily    pantoprazole  40 mg Intravenous BID    phenazopyridine  100 mg Oral TID WM     Continuous Infusions  None      PHYSICAL EXAM   VITALS: T 98 °F (36.7 °C)   /69   P (!) 57   RR 16   O2 (!) 93 %    GENERAL: Awake and in NAD  LUNGS: decreased air movement in the bases  CVS: Normal rate  GI/: Soft, NT, bowel sounds positive.  EXTREMITIES: 1-2+ LE edema  NEURO: AAOx3  PSYCH: Cooperative      LABS   CBC  Recent Labs     03/30/24  0420 04/01/24  0359   WBC 13.71* 11.96*   RBC 2.91* 3.00*   HGB 8.9* 9.1*   HCT 27.7* 28.7*   MCV 95.2* 95.7*   MCH 30.6 30.3   MCHC 32.1* 31.7*   RDW 16.9 17.1*    295       CHEM  Recent Labs     03/31/24  0410 04/01/24  0359    141   K 3.4* 4.0   CHLORIDE 103 103   CO2 25 28   BUN 29.2* 32.1*    CREATININE 1.09 1.00   GLUCOSE 169* 155*   CALCIUM 8.9 8.8   MG 2.00 1.90           MICROBIOLOGY     Microbiology Results (last 7 days)       Procedure Component Value Units Date/Time    Respiratory Culture [[id number]]     Order Status: Sent Specimen: Sputum, Expectorated               ASSESSMENT   Scrotal abscess, with component of early Claudine's, s/p exploration, I&D, and left orchiectomy 03/18.  Severe sepsis from above, resolving  Questionable RLL post-viral Pneumonia, Human Metapneumovirus positive, resolving  Acute hypoxic respiratory failure from above  Cardiac arrest with ventricular tachycardia requiring defibrillation x3 (03/18/2024)  Acute on chronic diastolic heart failure  AFib RVR  NSTMI, unspecified  Partial bowel obstruction  Normocytic anemia.    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  Mild ELIZABETH, resolved    PLAN   Continue diuresis, transitioned to oral Lasix  Monitor I&Os, renal function and electrolytes  Wean supplemental oxygen as tolerated  Can transition steroids to oral, and wean   Continue oral Augmentin to complete a 2 week course of antibiotics  Continue ASA/statin  Will transition Lovenox to NOAC with Xarelto  Continue PPI BID (1 month, then daily)  Amiodarone taper as outlined by Cardiology  CM consulted for SNF placement  Otherwise continue current management and monitoring in the interim      Prophylaxis: FD Lovenox        Jori Santos MD  [facility]       bowel sounds normal/no distention/soft/nontender

## 2024-04-01 NOTE — PROGRESS NOTES
"  [facility]    Cardiology  Progress Note    Patient Name: Ran Reyes Jr.  MRN: [id number]  Admission Date: 3/17/2024  Hospital Length of Stay: 14 days  Code Status: Full Code   Attending Physician: Jori Santos MD   Primary Care Physician: Abiodun Recinos DO  Expected Discharge Date:   Principal Problem:Scrotal hematoma    Subjective:   Chief Complaint/Reason for Consult: OAC recs     HPI: Ran Reyes Jr. Is a 68 year old male, known to Dr. Wagner, with PMH of  cardiomyopathy, systolic heart failure with an EF of 41%, VHD, chronic AFib on Xarelto, peripheral arterial disease, COPD, HTN, and a large left testicle hydrocele who presented to the ED 3/18/24 for scrotal bleeding with testicular swelling and pain. Found to have sepsis likely due to UTI, acute bronchitis. Also found in ED to be in Afib RVR on EKG, bp 90s/60s. BNP 1273, troponin 0.045 --> 0.055. Patient admits to dyspnea, cough, and wheezing. Denies chest pains. Cardiology being consulted for OAC recommendations.    Hospital Course:  3.19.24: Intubated/Mechanical Ventilation. AF SVR. On Vasopressor Support. Family at bedside.   3.20.24: NAD. Vented/Sedated. PAF/CVR. Dobutamine 2.5mcg/kg/min. Amiodarone 0.5mg/min.   3.21.24: NAD. Extubated. "I am ok." PAF/CVR. Levophed 0.01mcg/kg/min. Oral Amiodarone.   3.22.24: NAD. "I am feeling better." PAF/SVR but Mostly CVR. Off Pressors.   3.23.24: NAD. "I am good." PAF/SVR - Mostly CVR. No Profound Bradycardia since Yesterday. Remains off Pressors.   3.24.24: NAD. "I am fine." PAF/SVR, Mostly CVR in 60s, PVCs. H&H 7.3/23.3 - Transfusion of PRBCs.  3.25.24: NAD. "I am ok." PAF/SVR. Mostly CVR 60s-70s. H&H 8.7/26.2; GI Clear for DAPT/AC  3.26.24: NAD. Sitting in Bedside Chair. "I am feeling pretty good." H&H 8.5/26.8 s/p 1 Unit PRBCS on 3.25.24. Simple Face Mask. Denies CP, SOB and Palps.   3.27.24: NAD. Denies CP, SOB, or palps. Right wrist benign. "I feel good." H&H stable. On 5 L face mask. " "  3.28.24: NAD. Denies Cp, SOB, or palps. AF CVR on tele. BP stable. On 3 L NC. "I am okay."   3.29.24: NAD. Currently on Bipap. AF CVR on tele. Episode of hypoxia overnight. Started on BIPAP. CXR demonstrating right middle lobe infiltrate consistent with PNA. Started on antibiotics and IV antibiotics.   3.30.24: NAD. Reports feeling much better. Down to 2 L NC. Daily net negative 2544 mL/24 hours. AF CVR on tele. BP stable.   3.31.24: NAD. "I feel fine." 2 L NC. Inaccurate I&O's but 2000 mL output documented yesterday evening. AF CVR on tele. BP stable. K 3.4.   4.1.24: NAD. Denies CP, SOB, or palps. Voided 2700 mL/24 hours. AF CVR on tele. BUN increasing. K improved. "I feel good."      PMH: Cardiomyopathy, systolic heart failure with an EF of 41%, VHD, chronic AFib on Xarelto, peripheral arterial disease, COPD, HTN, CAD (Details Unclear)  PSH: cardiac stent >10 years ago, L FA atherectomy and angioplasty, R SFA stent, cataract extraction,   Family History: Father MI at age 66.   Social History: 10+ pack year hx current smoker, social EtOH, denies drugs.     Previous Cardiac Diagnostics:   TTE (3.28.24):  Left Ventricle: The left ventricle is normal in size. Moderately increased wall thickness. There is low normal systolic function with a visually estimated ejection fraction of 50 - 55%. There is diastolic dysfunction.  Mitral Valve: There is moderate regurgitation.    LHC (3.27.24):  Left Atrium: Agitated saline study of the atrial septum is faintly positive. Small amount of late bubbles crossing only with valsalva images. Could be via pulmonary transit. No clear atrial level shunt.      LHC (3.26.24):  Coronary findings:  Dominance: right   Left main:  10-20% calcified distal left main disease  Left anterior descending artery:  50% calcified proximal stenosis  Circumflex artery:  Luminal irregularities  Right coronary artery:  Luminal irregularities    Echocardiogram (3.19.24):  Left Ventricle: The left " ventricle is normal in size. Increased wall thickness. Unable to assess wall motion. There is moderately reduced systolic function with a visually estimated ejection fraction of 30 - 40%.  Right Ventricle: Mild right ventricular enlargement.  Left Atrium: Left atrium is moderately dilated.  Right Atrium: Right atrium is severely dilated.  Mitral Valve: There is moderate regurgitation.  Tricuspid Valve: There is mild to moderate regurgitation.  Pulmonary Artery: Pulmonary artery pressure could not be estimated.  IVC/SVC: Patient is ventilated, cannot use IVC diameter to estimate right atrial pressure.    Echocardiogram (1.31.24):   Left Ventricle: The left ventricle is normal in size. Mildly increased wall thickness. There is reduced systolic function. Biplane (2D) method of discs ejection fraction is 41%. Unable to assess diastolic function due to atrial fibrillation.  Right Ventricle: Normal right ventricular cavity size. Systolic function is mildly reduced.  Left Atrium: Left atrium is severely dilated.  Right Atrium: Right atrium is severely dilated.  Aortic Valve: The aortic valve is a trileaflet valve.  Mitral Valve: There is bileaflet sclerosis. There is moderate to severe regurgitation.  Tricuspid Valve: There is mild regurgitation.  IVC/SVC: Normal venous pressure at 3 mmHg.    Carotid US (2.7.23):  The study quality is average.   1-39% stenosis in the proximal right internal carotid artery based on Bluth Criteria. Antegrade right vertebral artery flow.   1-39% stenosis in the proximal left internal carotid artery based on Bluth Criteria. Antegrade left vertebral artery flow.     Echocardiogram (11.8.22):  The study quality is average.   The left ventricle is moderately enlarged. Left ventricular diastolic dimension is 6.4 cms. Global left ventricular systolic function is moderately decreased. The left ventricular ejection fraction is 40%. Arrhythmia noted throughout much of the exam.   There is no evidence  of mitral stenosis or prolapse appreciated. MV Ortiz score ~ 5. The area by planimetry is 5.3 cm². The mean trans mitral gradient is 1.6 mmHg. Suspect borderline severe (4+) mitral regurgitation with a posteriorly directed jet. MR PISA radius ~ 0.93 cm, MR ERO ~ 0.35 cm^2, MR regurgitant volume ~ 72 ml/beat, MR regurgitant fraction ~ 55%, MR vena contracta ~ 0.54 cm.  Mild calcification of the aortic valve is noted with adequate cuspal excursion.   Trace pulmonic regurgitation. Mild (1+) tricuspid regurgitation.    Peripheral Angiogram (10.17.22):  Underwent Successful CSI Atherectomy Balloon Angioplasty and Stenting of the Right SFA and CSI Atherectomy Balloon Angioplasty of Right Anterior Tibial Artery Using Pedal Approach.    Peripheral Angiogram (9.12.22):  Underwent Successful Left SFA Laser Atherectomy Balloon Angioplasty Using Left Radial Artery Approach.    Review of Systems   Constitutional: Positive for malaise/fatigue. Negative for fever.   Cardiovascular:  Negative for chest pain and leg swelling.   Respiratory:  Negative for shortness of breath.    Skin:         Scrotal Wound   All other systems reviewed and are negative.    Objective:     Vital Signs (Most Recent):  Temp: 98 °F (36.7 °C) (04/01/24 1101)  Pulse: 68 (04/01/24 1101)  Resp: 16 (04/01/24 0835)  BP: 116/69 (04/01/24 1101)  SpO2: (!) 93 % (04/01/24 1101) Vital Signs (24h Range):  Temp:  [97.8 °F (36.6 °C)-98.8 °F (37.1 °C)] 98 °F (36.7 °C)  Pulse:  [60-84] 68  Resp:  [16-20] 16  SpO2:  [93 %-97 %] 93 %  BP: (110-130)/(68-83) 116/69   Weight: 95 kg (209 lb 7 oz)  Body mass index is 29.21 kg/m².  SpO2: (!) 93 %       Intake/Output Summary (Last 24 hours) at 4/1/2024 1142  Last data filed at 3/31/2024 1938  Gross per 24 hour   Intake 907.92 ml   Output 1450 ml   Net -542.08 ml       Lines/Drains/Airways       Drain  Duration                  Urethral Catheter 03/18/24 1426 Straight-tip 16 Fr. 13 days              Peripheral Intravenous Line   Duration                  Peripheral IV - Single Lumen 03/24/24 1634 18 G Anterior;Left Forearm 7 days                  Significant Labs:   Recent Results (from the past 72 hour(s))   Triglycerides    Collection Time: 03/30/24  4:20 AM   Result Value Ref Range    Triglyceride 41 34 - 140 mg/dL   Magnesium    Collection Time: 03/30/24  4:20 AM   Result Value Ref Range    Magnesium Level 2.00 1.60 - 2.60 mg/dL   Basic Metabolic Panel    Collection Time: 03/30/24  4:20 AM   Result Value Ref Range    Sodium Level 140 136 - 145 mmol/L    Potassium Level 3.7 3.5 - 5.1 mmol/L    Chloride 104 98 - 107 mmol/L    Carbon Dioxide 24 23 - 31 mmol/L    Glucose Level 166 (H) 82 - 115 mg/dL    Blood Urea Nitrogen 24.2 8.4 - 25.7 mg/dL    Creatinine 1.02 0.73 - 1.18 mg/dL    BUN/Creatinine Ratio 24     Calcium Level Total 9.0 8.8 - 10.0 mg/dL    Anion Gap 12.0 mEq/L    eGFR >60 mls/min/1.73/m2   CBC with Differential    Collection Time: 03/30/24  4:20 AM   Result Value Ref Range    WBC 13.71 (H) 4.50 - 11.50 x10(3)/mcL    RBC 2.91 (L) 4.70 - 6.10 x10(6)/mcL    Hgb 8.9 (L) 14.0 - 18.0 g/dL    Hct 27.7 (L) 42.0 - 52.0 %    MCV 95.2 (H) 80.0 - 94.0 fL    MCH 30.6 27.0 - 31.0 pg    MCHC 32.1 (L) 33.0 - 36.0 g/dL    RDW 16.9 11.5 - 17.0 %    Platelet 339 130 - 400 x10(3)/mcL    MPV 10.3 7.4 - 10.4 fL    Neut % 95.1 %    Lymph % 2.6 %    Mono % 1.9 %    Eos % 0.0 %    Basophil % 0.1 %    Lymph # 0.35 (L) 0.6 - 4.6 x10(3)/mcL    Neut # 13.05 (H) 2.1 - 9.2 x10(3)/mcL    Mono # 0.26 0.1 - 1.3 x10(3)/mcL    Eos # 0.00 0 - 0.9 x10(3)/mcL    Baso # 0.01 <=0.2 x10(3)/mcL    IG# 0.04 0 - 0.04 x10(3)/mcL    IG% 0.3 %    NRBC% 0.0 %   Triglycerides    Collection Time: 03/31/24  4:10 AM   Result Value Ref Range    Triglyceride 58 34 - 140 mg/dL   Basic Metabolic Panel    Collection Time: 03/31/24  4:10 AM   Result Value Ref Range    Sodium Level 142 136 - 145 mmol/L    Potassium Level 3.4 (L) 3.5 - 5.1 mmol/L    Chloride 103 98 - 107 mmol/L     Carbon Dioxide 25 23 - 31 mmol/L    Glucose Level 169 (H) 82 - 115 mg/dL    Blood Urea Nitrogen 29.2 (H) 8.4 - 25.7 mg/dL    Creatinine 1.09 0.73 - 1.18 mg/dL    BUN/Creatinine Ratio 27     Calcium Level Total 8.9 8.8 - 10.0 mg/dL    Anion Gap 14.0 mEq/L    eGFR >60 mls/min/1.73/m2   Magnesium    Collection Time: 03/31/24  4:10 AM   Result Value Ref Range    Magnesium Level 2.00 1.60 - 2.60 mg/dL   Triglycerides    Collection Time: 04/01/24  3:59 AM   Result Value Ref Range    Triglyceride 55 34 - 140 mg/dL   Basic Metabolic Panel    Collection Time: 04/01/24  3:59 AM   Result Value Ref Range    Sodium Level 141 136 - 145 mmol/L    Potassium Level 4.0 3.5 - 5.1 mmol/L    Chloride 103 98 - 107 mmol/L    Carbon Dioxide 28 23 - 31 mmol/L    Glucose Level 155 (H) 82 - 115 mg/dL    Blood Urea Nitrogen 32.1 (H) 8.4 - 25.7 mg/dL    Creatinine 1.00 0.73 - 1.18 mg/dL    BUN/Creatinine Ratio 32     Calcium Level Total 8.8 8.8 - 10.0 mg/dL    Anion Gap 10.0 mEq/L    eGFR >60 mls/min/1.73/m2   Magnesium    Collection Time: 04/01/24  3:59 AM   Result Value Ref Range    Magnesium Level 1.90 1.60 - 2.60 mg/dL   CBC with Differential    Collection Time: 04/01/24  3:59 AM   Result Value Ref Range    WBC 11.96 (H) 4.50 - 11.50 x10(3)/mcL    RBC 3.00 (L) 4.70 - 6.10 x10(6)/mcL    Hgb 9.1 (L) 14.0 - 18.0 g/dL    Hct 28.7 (L) 42.0 - 52.0 %    MCV 95.7 (H) 80.0 - 94.0 fL    MCH 30.3 27.0 - 31.0 pg    MCHC 31.7 (L) 33.0 - 36.0 g/dL    RDW 17.1 (H) 11.5 - 17.0 %    Platelet 295 130 - 400 x10(3)/mcL    MPV 10.5 (H) 7.4 - 10.4 fL    Neut % 94.0 %    Lymph % 2.0 %    Mono % 3.3 %    Eos % 0.1 %    Basophil % 0.1 %    Lymph # 0.24 (L) 0.6 - 4.6 x10(3)/mcL    Neut # 11.24 (H) 2.1 - 9.2 x10(3)/mcL    Mono # 0.40 0.1 - 1.3 x10(3)/mcL    Eos # 0.01 0 - 0.9 x10(3)/mcL    Baso # 0.01 <=0.2 x10(3)/mcL    IG# 0.06 (H) 0 - 0.04 x10(3)/mcL    IG% 0.5 %    NRBC% 0.0 %     Telemetry: PAF/CVR    Physical Exam  Vitals reviewed.   Constitutional:        General: He is not in acute distress.     Appearance: Normal appearance. He is ill-appearing.   HENT:      Head: Normocephalic.      Mouth/Throat:      Mouth: Mucous membranes are moist.   Eyes:      Extraocular Movements: Extraocular movements intact.      Conjunctiva/sclera: Conjunctivae normal.   Cardiovascular:      Rate and Rhythm: Normal rate. Rhythm irregular.      Heart sounds: Murmur heard.      Comments: Right wrist soft, nontender. No hematoma noted. + 2 peripheral pulse  Pulmonary:      Effort: Pulmonary effort is normal. No respiratory distress.      Breath sounds: Normal breath sounds. No rales.      Comments: 2 L NC  Abdominal:      Palpations: Abdomen is soft.   Musculoskeletal:         General: No swelling.      Right lower leg: No edema.      Left lower leg: No edema.   Neurological:      General: No focal deficit present.      Mental Status: He is alert and oriented to person, place, and time.   Psychiatric:         Mood and Affect: Mood normal.         Behavior: Behavior normal.         Judgment: Judgment normal.       Current Inpatient Medications:    Current Facility-Administered Medications:     0.9%  NaCl infusion (for blood administration), , Intravenous, Q24H PRN, Carmen Griggs FNP    0.9%  NaCl infusion (for blood administration), , Intravenous, Q24H PRN, Joshua Hernandez ANP    acetaminophen tablet 650 mg, 650 mg, Oral, Q8H PRN, Harris Hernandez MD    albuterol-ipratropium 2.5 mg-0.5 mg/3 mL nebulizer solution 3 mL, 3 mL, Nebulization, Q4H, Natali Seymour MD, 3 mL at 04/01/24 0835    amiodarone tablet 200 mg, 200 mg, Oral, BID, Davida Mo FNP, 200 mg at 04/01/24 0857    [START ON 4/3/2024] amiodarone tablet 200 mg, 200 mg, Oral, Daily, Davida Mo FNP    amoxicillin-clavulanate 875-125mg per tablet 1 tablet, 1 tablet, Oral, Q8H, Jori Santos MD, 1 tablet at 04/01/24 0535    aspirin EC tablet 81 mg, 81 mg, Oral, Daily, Jori Santos MD, 81 mg at 04/01/24  0857    atorvastatin tablet 80 mg, 80 mg, Oral, Daily, Cassidy Obrien MD, 80 mg at 04/01/24 0857    chlorhexidine 0.12 % solution 15 mL, 15 mL, Mouth/Throat, BID, David Gonsalez MD, 15 mL at 04/01/24 0857    dextrose 10 % infusion, , Intravenous, PRN, David Gonsalez MD    dextrose 10 % infusion, , Intravenous, PRN, David Gonsalez MD    dextrose 10% bolus 125 mL 125 mL, 12.5 g, Intravenous, PRN, David Gonsalez MD    dextrose 10% bolus 250 mL 250 mL, 25 g, Intravenous, PRN, David Gonsalez MD    doxycycline tablet 100 mg, 100 mg, Oral, Q12H, Jori Santos MD, 100 mg at 04/01/24 0857    enoxaparin injection 90 mg, 1 mg/kg, Subcutaneous, Q12H (prophylaxis, 0900/2100), Natali Seymour MD, 90 mg at 04/01/24 0856    folic acid tablet 1 mg, 1 mg, Oral, Daily, Jenna Miller MD, 1 mg at 04/01/24 0857    furosemide injection 40 mg, 40 mg, Intravenous, Daily, Jori Santos MD, 40 mg at 04/01/24 0857    hydrALAZINE injection 10 mg, 10 mg, Intravenous, Q4H PRN, Adriano Montiel MD    insulin aspart U-100 injection 0-10 Units, 0-10 Units, Subcutaneous, Q6H PRN, Narendra Pradhan DO    ipratropium 0.02 % nebulizer solution 0.5 mg, 0.5 mg, Nebulization, Q6H PRN, David Gonsalez MD    LORazepam tablet 2 mg, 2 mg, Oral, Q4H PRN, Jenna Miller MD, 2 mg at 03/29/24 2132    melatonin tablet 6 mg, 6 mg, Oral, Nightly PRN, Harris Hernandez MD, 6 mg at 03/31/24 2151    methylPREDNISolone sodium succinate injection 80 mg, 80 mg, Intravenous, Q12H, Harris Hernandez MD, 80 mg at 04/01/24 0535    metoprolol succinate (TOPROL-XL) 24 hr split tablet 12.5 mg, 12.5 mg, Oral, Daily, Davida Mo, PARAMJITP, 12.5 mg at 04/01/24 0857    midodrine tablet 10 mg, 10 mg, Oral, TID WM, Narendra Pradhan, DO, 10 mg at 04/01/24 0857    morphine injection 2 mg, 2 mg, Intravenous, Q6H PRN, David Gonsalez MD, 2 mg at 03/31/24 7458    multivitamin tablet, 1 tablet, Oral, Daily, Jenna Miller MD, 1 tablet at  04/01/24 0857    ondansetron injection 4 mg, 4 mg, Intravenous, Q8H PRN, Adriano Montiel MD    oxyCODONE immediate release tablet 5 mg, 5 mg, Oral, Q4H PRN, Harris Hernandez MD, 5 mg at 03/31/24 1301    pantoprazole injection 40 mg, 40 mg, Intravenous, BID, Cassidy Obrien MD, 40 mg at 04/01/24 0857    phenazopyridine tablet 100 mg, 100 mg, Oral, TID WM, Jori Santos MD, 100 mg at 04/01/24 0857    sodium chloride 0.9% flush 10 mL, 10 mL, Intravenous, PRN, Narendra Pradhan, , 10 mL at 03/29/24 2206    sodium chloride 0.9% flush 10 mL, 10 mL, Intravenous, PRN, David Gonsalez MD    Facility-Administered Medications Ordered in Other Encounters:     0.9%  NaCl infusion, , Intravenous, Continuous, Shannon Milligan MD, Last Rate: 10 mL/hr at 03/09/23 1020, New Bag at 03/09/23 1020    diphenhydrAMINE injection 25 mg, 25 mg, Intravenous, Once PRN, Shannon Milligan MD    LIDOcaine (PF) 10 mg/ml (1%) injection 10 mg, 1 mL, Intradermal, Once, Lanette Ulloa FNP    LIDOcaine (PF) 10 mg/ml (1%) injection 10 mg, 1 mL, Intradermal, Once, Shannon Milligan MD    ondansetron injection 4 mg, 4 mg, Intravenous, Once, Shannon Milligan MD    prochlorperazine injection Soln 5 mg, 5 mg, Intravenous, Once PRN, Shannon Milligan MD  VTE Risk Mitigation (From admission, onward)           Ordered     enoxaparin injection 90 mg  Every 12 hours         03/24/24 0901     IP VTE LOW RISK PATIENT  Once         03/19/24 0422     Place sequential compression device  Until discontinued         03/18/24 0124                  Assessment:   Cardiac Arrest/VT (Post Operative Left Héctor Orchiectomy - in PACU 3.18.24)     - Requiring Multiple Defibrillations (x 3)  NSTEMI Type 2 in the Setting of Cardiac Arrest, Anemia  Anemia - Stable    - Cleared by GI for DAPT/AC    - Requiring Transfusion    - EGD (3.22.24) - LA Grade B Reflux Esophagitis with No Bleeding, Chronic Gastritis, Protonix; Colonoscopy as OP 2/2 Scrotal  Wound  Chronic Atrial Fibrillation - Now AF CVR (HR 50's-60s with Intermittent PVCS)    - Xarelto on Hold Post Scrotal Abscess I&D (On FD Lovenox)  Non Ischemic Cardiomyopathy - Recovered LVEF    - EF 30-40%    - EF   Nonobstructive CAD    - [facility] 3.26.24: Left main 10-20%, LAD 50%  Acute Hypoxemic Respiratory Failure requiring Intubation/Ventilation - Now Extubated on Supplemental O2  Hypotension Requiring Vasopressor Support - Resolved     - History of Hypertension  Valvular Heart Disease    - MR: Moderate, TR: Mild to Moderate  Hyperlipidemia    - On Statin  SIRS - Likely UTI Related - Resolved     - BC x 2 Negative at 5 Days  Scrotal Abscess    - Status Post Surgical Exploration, Left Orchiectomy, & Debridement of Wound/Wound Packing (3.18.24)   COPD  Partial Bowel Obstruction - Resolved   Long Term Oral Anticoagulation  PAD  Infrarenal Abdominal Aortic Aneurysmal Dilatation with Mural Thrombus (Stable)    - Maximum diameter is 3.6 cm without significant interval change   Acute Human Metapneumovirus Infection (On Droplet Precautions) - Off Precautions    Plan:   Echo from 3.27.24 reviewed. No need for further testing.   Continue PO amio load.   GI Cleared PT for DAPT/AC. Continue ASA 81 mg daily. Continue FD lovenox for CVA prophylaxis in the setting of PAF. Resume Xarelto prior to discharge.   Continue Toprol XL 12.5 mg daily.   Transition to Lasix 40 mg PO daily. Do not suspect that he will need this at discharge.   Ensure accurate I&O's and daily weights.   Wean O2 as tolerated.   Antibiotic Management/Wound Care as per Primary Team  LVEF improved. He does not qualify for a lifevest.   Keep K > 4 & Mg > 2. Replete Mg.     Davida Mo, MALI  Cardiology  [facility]  04/01/2024

## 2024-04-02 LAB
MAGNESIUM SERPL-MCNC: 2.1 MG/DL (ref 1.6–2.6)
OHS QRS DURATION: 104 MS
OHS QTC CALCULATION: 524 MS
TRIGL SERPL-MCNC: 60 MG/DL (ref 34–140)

## 2024-04-02 PROCEDURE — 93005 ELECTROCARDIOGRAM TRACING: CPT

## 2024-04-02 PROCEDURE — 83735 ASSAY OF MAGNESIUM: CPT

## 2024-04-02 PROCEDURE — 94761 N-INVAS EAR/PLS OXIMETRY MLT: CPT

## 2024-04-02 PROCEDURE — 94760 N-INVAS EAR/PLS OXIMETRY 1: CPT

## 2024-04-02 PROCEDURE — 94640 AIRWAY INHALATION TREATMENT: CPT

## 2024-04-02 PROCEDURE — 25000242 PHARM REV CODE 250 ALT 637 W/ HCPCS: Performed by: INTERNAL MEDICINE

## 2024-04-02 PROCEDURE — 63600175 PHARM REV CODE 636 W HCPCS: Performed by: INTERNAL MEDICINE

## 2024-04-02 PROCEDURE — 63600175 PHARM REV CODE 636 W HCPCS

## 2024-04-02 PROCEDURE — C9113 INJ PANTOPRAZOLE SODIUM, VIA: HCPCS | Performed by: INTERNAL MEDICINE

## 2024-04-02 PROCEDURE — 21400001 HC TELEMETRY ROOM

## 2024-04-02 PROCEDURE — 25000003 PHARM REV CODE 250: Performed by: STUDENT IN AN ORGANIZED HEALTH CARE EDUCATION/TRAINING PROGRAM

## 2024-04-02 PROCEDURE — 84478 ASSAY OF TRIGLYCERIDES: CPT

## 2024-04-02 PROCEDURE — 99900035 HC TECH TIME PER 15 MIN (STAT)

## 2024-04-02 PROCEDURE — 25000003 PHARM REV CODE 250

## 2024-04-02 PROCEDURE — 27000221 HC OXYGEN, UP TO 24 HOURS

## 2024-04-02 PROCEDURE — 25000003 PHARM REV CODE 250: Performed by: INTERNAL MEDICINE

## 2024-04-02 PROCEDURE — 93010 ELECTROCARDIOGRAM REPORT: CPT | Mod: ,,, | Performed by: INTERNAL MEDICINE

## 2024-04-02 RX ADMIN — AMOXICILLIN AND CLAVULANATE POTASSIUM 1 TABLET: 875; 125 TABLET, FILM COATED ORAL at 06:04

## 2024-04-02 RX ADMIN — THERA TABS 1 TABLET: TAB at 09:04

## 2024-04-02 RX ADMIN — LORAZEPAM 2 MG: 1 TABLET ORAL at 05:04

## 2024-04-02 RX ADMIN — AMIODARONE HYDROCHLORIDE 200 MG: 200 TABLET ORAL at 09:04

## 2024-04-02 RX ADMIN — PANTOPRAZOLE SODIUM 40 MG: 40 INJECTION, POWDER, FOR SOLUTION INTRAVENOUS at 09:04

## 2024-04-02 RX ADMIN — AMOXICILLIN AND CLAVULANATE POTASSIUM 1 TABLET: 875; 125 TABLET, FILM COATED ORAL at 08:04

## 2024-04-02 RX ADMIN — FUROSEMIDE 40 MG: 40 TABLET ORAL at 10:04

## 2024-04-02 RX ADMIN — IPRATROPIUM BROMIDE AND ALBUTEROL SULFATE 3 ML: 2.5; .5 SOLUTION RESPIRATORY (INHALATION) at 08:04

## 2024-04-02 RX ADMIN — DOXYCYCLINE HYCLATE 100 MG: 100 TABLET, COATED ORAL at 08:04

## 2024-04-02 RX ADMIN — PHENAZOPYRIDINE HYDROCHLORIDE 100 MG: 100 TABLET ORAL at 09:04

## 2024-04-02 RX ADMIN — PREDNISONE 40 MG: 20 TABLET ORAL at 09:04

## 2024-04-02 RX ADMIN — ATORVASTATIN CALCIUM 80 MG: 40 TABLET, FILM COATED ORAL at 09:04

## 2024-04-02 RX ADMIN — PHENAZOPYRIDINE HYDROCHLORIDE 100 MG: 100 TABLET ORAL at 05:04

## 2024-04-02 RX ADMIN — MIDODRINE HYDROCHLORIDE 10 MG: 5 TABLET ORAL at 12:04

## 2024-04-02 RX ADMIN — DOXYCYCLINE HYCLATE 100 MG: 100 TABLET, COATED ORAL at 09:04

## 2024-04-02 RX ADMIN — FOLIC ACID 1 MG: 1 TABLET ORAL at 09:04

## 2024-04-02 RX ADMIN — PANTOPRAZOLE SODIUM 40 MG: 40 INJECTION, POWDER, FOR SOLUTION INTRAVENOUS at 08:04

## 2024-04-02 RX ADMIN — CHLORHEXIDINE GLUCONATE 0.12% ORAL RINSE 15 ML: 1.2 LIQUID ORAL at 10:04

## 2024-04-02 RX ADMIN — IPRATROPIUM BROMIDE AND ALBUTEROL SULFATE 3 ML: 2.5; .5 SOLUTION RESPIRATORY (INHALATION) at 07:04

## 2024-04-02 RX ADMIN — IPRATROPIUM BROMIDE AND ALBUTEROL SULFATE 3 ML: 2.5; .5 SOLUTION RESPIRATORY (INHALATION) at 04:04

## 2024-04-02 RX ADMIN — MORPHINE SULFATE 2 MG: 4 INJECTION, SOLUTION INTRAMUSCULAR; INTRAVENOUS at 01:04

## 2024-04-02 RX ADMIN — IPRATROPIUM BROMIDE AND ALBUTEROL SULFATE 3 ML: 2.5; .5 SOLUTION RESPIRATORY (INHALATION) at 12:04

## 2024-04-02 RX ADMIN — PREDNISONE 40 MG: 20 TABLET ORAL at 08:04

## 2024-04-02 RX ADMIN — MIDODRINE HYDROCHLORIDE 10 MG: 5 TABLET ORAL at 05:04

## 2024-04-02 RX ADMIN — RIVAROXABAN 20 MG: 10 TABLET, FILM COATED ORAL at 05:04

## 2024-04-02 RX ADMIN — IPRATROPIUM BROMIDE AND ALBUTEROL SULFATE 3 ML: 2.5; .5 SOLUTION RESPIRATORY (INHALATION) at 03:04

## 2024-04-02 RX ADMIN — METOPROLOL SUCCINATE 12.5 MG: 25 TABLET, EXTENDED RELEASE ORAL at 09:04

## 2024-04-02 RX ADMIN — MIDODRINE HYDROCHLORIDE 10 MG: 5 TABLET ORAL at 09:04

## 2024-04-02 RX ADMIN — CHLORHEXIDINE GLUCONATE 0.12% ORAL RINSE 15 ML: 1.2 LIQUID ORAL at 08:04

## 2024-04-02 RX ADMIN — ASPIRIN 81 MG: 81 TABLET, COATED ORAL at 09:04

## 2024-04-02 RX ADMIN — PHENAZOPYRIDINE HYDROCHLORIDE 100 MG: 100 TABLET ORAL at 12:04

## 2024-04-02 RX ADMIN — AMOXICILLIN AND CLAVULANATE POTASSIUM 1 TABLET: 875; 125 TABLET, FILM COATED ORAL at 12:04

## 2024-04-02 RX ADMIN — LORAZEPAM 2 MG: 1 TABLET ORAL at 07:04

## 2024-04-02 NOTE — PHYSICIAN QUERY
PT Name: Ran Reyes Jr.  MR #: [id number]     DOCUMENTATION CLARIFICATION    Mere Montoya RN, CCDS   Documentation Excellence  [email or web address]     This form is a permanent document in the medical record.    Query Date: April 2, 2024    By submitting this query, we are merely seeking further clarification of documentation.   Please utilize your independent clinical judgment when addressing the question(s) below.     The Medical Record contains the following:   Indicators   Supporting Clinical Findings Location in Medical Record   x Documentation of Sepsis,   Septic Shock Borderline hypotension with the elevated BNP, scrotal hematoma possibly SIRS criteria/sepsis.      septic and cardiogenic shock on Levophed, add  inotrope     Sepsis, likely from urinary tract infection     -sepsis -  BC negative thus far; wound culture with e coli and group B strep    Sepsis   - Scrotal abscess s/p I&D with left joselito orchiectomy 03/18/2024   Ed md      ICU H&P 3/18     H&P 3/18 - 4/2 Hosp PN    3/22 Urology PN       H&P - HM 3/27          x Blood   Culture 3/18  BC  No Growth at 5 days  3/17  BC  No Growth at 5 days   lab   x Respiratory Culture 3/20 Resp Cx  Moderate Yeast                      Gram stain:  Quality 2+  No bacteria seen   lab   x Urine   Culture 3/18 UCX   10,000 - 25,000 colonies/ml Yeast lab   x Other   Culture 3/18  Abscess from Scrotum   Anaerobe Culture                 No Anaerobes Isolated  Wound cx:  Many Escherichia coli                       Many Streptococcus agalactiae (Group B)     lab   x Acute/  Chronic   Illness  1. Scrotal ulceration / bleeding / hematoma  2.  Sepsis, source likely urinary tract infection  3. Acute bronchitis with mild exacerbation COPD  4. NSTEMI type 2 demand ischemia 2/2 above  5. Nonischemic cardiomyopathy  (EF 41% 01/2024)   6. Valvular heart disease  7. Chronic atrial fibrillation on Xarelto   8. Peripheral arterial disease/stents  9. COPD, not on home oxygen   10.  Essential hypertension  11. Hyperlipidemia    history of nonischemic cardiomyopathy, systolic heart failure with an EF of 41%, VHD,          chronic AFib on Xarelto, peripheral arterial disease, COPD not on home oxygen,         HTN, and a large left testicle hydrocele     Chf exacerbation    S/p sx exploration on orchiectomy overnight, went into PEA arrest w/return of   spontaneous circulation approx 4 minutes  V-tach started on amnio, Mag and defibrillation x2  septic and cardiogenic shock on Levophed, add  inotrope  -viral pneumonitis PCR positive for metapneumovirus   on VENT  Improved perfusion parameters lactate cleared end-organ perfusion stable  underwent surgery, seemed to tolerate well.  In the PACU. . .  80/50 despite fluid resuscitation.  Patient was placed on phenylephrine for vasopressor support . . .   Sepsis, likely from urinary tract infection   Persistent hypotension requiring vasopressor support   received approximately 1.5 L of fluid resuscitation, holding off on full sepsis protocol in setting of reduced ejection fraction      -torsades - s/p multiple defibrillations Sepsis- Likely UTI Related                            Acute Human Metapneumovirus Infection    septic and cardiogenic shock improved off pressors remains on dobutamine  viral pneumonitis PCR positive for metapneumovirus,    ELIZABETH   H&P                        H&P        3/18 Cards CN         ICU H&P 3/18                       3/19 card PN      3/20 Pulm PN      3/25-4/2 Hosp PN    x Medication/  Treatment   NS 1 L 3/19  NS @ 50 ml/hr 3/22-24  LR 1 L bolus in ED x2  LR 1 L bolus on 3/19   ml bolus on 3/18   ml bolus on 3/19  LR @ 500 ml/hr 3/20 x2  LR @ 75 ml/hr 3//19    albuterol-ipratropium nebulizer  3/18-24  ipratropium nebulizer  start 3/19  levalbuterol nebulizer  3/20-24    amoxicillin-clavulanate 875-125mg per tablet  3/25-31  CefTRIAXone IVPB    3/29  doxycycline tablet 100 mg  start 3/29  Levovloxacin IV  "3/29  Piperacillin-tazobactam IVPB 3/18-31  Vancomycin IVPB start 3/17   mar   x Other 2 u PRBC 3/24 & 3/25     OP NOTE 3/19  Rob GARCIA        Left scrotal abscess.  PROC:  Left hemiscrotal exploration; left orchiectomy; debridement of wound;               washout with Pulsavac; wound packing.  Pre op situation: early Claudine gangrene.    FINAL DIAGNOSIS    Sx Path 3/18   LEFT TESTICLE:    WET GANGRENE (PREDOMINANTLY EXTRATESTICULAR) WITH SURFACE ULCER.    EXTENSIVE ACUTE INFLAMMATION, INTERSTITIAL EDEMA AND ORGANIZING FIBROSIS.    TESTICLE WITH ADVANCED ATROPHIC CHANGES AND LEYDIG CELL HYPERPLASIA.    EPIDIDYMIS WITH INTERTUBULAR FIBROSIS AND CHRONIC INFLAMMATION.    NO EVIDENCE OF MALIGNANCY.    REPRESENTATIVE SECTIONS OF THE SPERMATIC CORD MARGIN ARE VIABLE.   Geraldo Guerrero M.D. , Pathologist        (Case signed 03/20/2024 at 02:19pm)    Blood bank      3/19 OP NOTE              Sx Path 3/18      Provider, please further specify "Sepsis" organism & source:                             - possible 2 part response -                            - Zhao all that apply -     [   ] Due to E. Coli     - source: Scrotum abscess     [   ] Due to Streptococcus agalactiae (Group B)     - source: Scrotum abscess     [   ] Unknown organism    - source:  UTI     [   ] Other organism    - source: UTI               - specify organism(s): ____________     [   ] Unknown organism    - specify source(s): _________     [   ] Due to other organism & other source:                   - specify organism(s): ____________                 - specify source(s): __________     [ x ] Other -specify: due polymicrobial scrotal infection     [   ] Clinically Undetermined     Please document in your progress notes daily for the duration of treatment until resolved, and include in your discharge summary.    Form No. 87746    "

## 2024-04-02 NOTE — PROGRESS NOTES
"  [facility]    Cardiology  Progress Note    Patient Name: Ran Reyes Jr.  MRN: [id number]  Admission Date: 3/17/2024  Hospital Length of Stay: 15 days  Code Status: Full Code   Attending Physician: Jori Santos MD   Primary Care Physician: Abiodun Recinos DO  Expected Discharge Date:   Principal Problem:Scrotal hematoma    Subjective:   Chief Complaint/Reason for Consult: OAC recs     HPI: Ran Reyes Jr. Is a 68 year old male, known to Dr. Wagner, with PMH of  cardiomyopathy, systolic heart failure with an EF of 41%, VHD, chronic AFib on Xarelto, peripheral arterial disease, COPD, HTN, and a large left testicle hydrocele who presented to the ED 3/18/24 for scrotal bleeding with testicular swelling and pain. Found to have sepsis likely due to UTI, acute bronchitis. Also found in ED to be in Afib RVR on EKG, bp 90s/60s. BNP 1273, troponin 0.045 --> 0.055. Patient admits to dyspnea, cough, and wheezing. Denies chest pains. Cardiology being consulted for OAC recommendations.    Hospital Course:  3.19.24: Intubated/Mechanical Ventilation. AF SVR. On Vasopressor Support. Family at bedside.   3.20.24: NAD. Vented/Sedated. PAF/CVR. Dobutamine 2.5mcg/kg/min. Amiodarone 0.5mg/min.   3.21.24: NAD. Extubated. "I am ok." PAF/CVR. Levophed 0.01mcg/kg/min. Oral Amiodarone.   3.22.24: NAD. "I am feeling better." PAF/SVR but Mostly CVR. Off Pressors.   3.23.24: NAD. "I am good." PAF/SVR - Mostly CVR. No Profound Bradycardia since Yesterday. Remains off Pressors.   3.24.24: NAD. "I am fine." PAF/SVR, Mostly CVR in 60s, PVCs. H&H 7.3/23.3 - Transfusion of PRBCs.  3.25.24: NAD. "I am ok." PAF/SVR. Mostly CVR 60s-70s. H&H 8.7/26.2; GI Clear for DAPT/AC  3.26.24: NAD. Sitting in Bedside Chair. "I am feeling pretty good." H&H 8.5/26.8 s/p 1 Unit PRBCS on 3.25.24. Simple Face Mask. Denies CP, SOB and Palps.   3.27.24: NAD. Denies CP, SOB, or palps. Right wrist benign. "I feel good." H&H stable. On 5 L face mask. " "  3.28.24: NAD. Denies Cp, SOB, or palps. AF CVR on tele. BP stable. On 3 L NC. "I am okay."   3.29.24: NAD. Currently on Bipap. AF CVR on tele. Episode of hypoxia overnight. Started on BIPAP. CXR demonstrating right middle lobe infiltrate consistent with PNA. Started on antibiotics and IV antibiotics.   3.30.24: NAD. Reports feeling much better. Down to 2 L NC. Daily net negative 2544 mL/24 hours. AF CVR on tele. BP stable.   3.31.24: NAD. "I feel fine." 2 L NC. Inaccurate I&O's but 2000 mL output documented yesterday evening. AF CVR on tele. BP stable. K 3.4.   4.1.24: NAD. Denies CP, SOB, or palps. Voided 2700 mL/24 hours. AF CVR on tele. BUN increasing. K improved. "I feel good."   4.2.24: NAD. Episode of CP this AM. EKG was ordered and demonstrated prolonged Qtc. AF CVR on tele. Daily net negative 1635 mL/24 hours.      PMH: Cardiomyopathy, systolic heart failure with an EF of 41%, VHD, chronic AFib on Xarelto, peripheral arterial disease, COPD, HTN, CAD (Details Unclear)  PSH: cardiac stent >10 years ago, L FA atherectomy and angioplasty, R SFA stent, cataract extraction,   Family History: Father MI at age 66.   Social History: 10+ pack year hx current smoker, social EtOH, denies drugs.     Previous Cardiac Diagnostics:   TTE (3.28.24):  Left Ventricle: The left ventricle is normal in size. Moderately increased wall thickness. There is low normal systolic function with a visually estimated ejection fraction of 50 - 55%. There is diastolic dysfunction.  Mitral Valve: There is moderate regurgitation.    LHC (3.27.24):  Left Atrium: Agitated saline study of the atrial septum is faintly positive. Small amount of late bubbles crossing only with valsalva images. Could be via pulmonary transit. No clear atrial level shunt.      LHC (3.26.24):  Coronary findings:  Dominance: right   Left main:  10-20% calcified distal left main disease  Left anterior descending artery:  50% calcified proximal stenosis  Circumflex " artery:  Luminal irregularities  Right coronary artery:  Luminal irregularities    Echocardiogram (3.19.24):  Left Ventricle: The left ventricle is normal in size. Increased wall thickness. Unable to assess wall motion. There is moderately reduced systolic function with a visually estimated ejection fraction of 30 - 40%.  Right Ventricle: Mild right ventricular enlargement.  Left Atrium: Left atrium is moderately dilated.  Right Atrium: Right atrium is severely dilated.  Mitral Valve: There is moderate regurgitation.  Tricuspid Valve: There is mild to moderate regurgitation.  Pulmonary Artery: Pulmonary artery pressure could not be estimated.  IVC/SVC: Patient is ventilated, cannot use IVC diameter to estimate right atrial pressure.    Echocardiogram (1.31.24):   Left Ventricle: The left ventricle is normal in size. Mildly increased wall thickness. There is reduced systolic function. Biplane (2D) method of discs ejection fraction is 41%. Unable to assess diastolic function due to atrial fibrillation.  Right Ventricle: Normal right ventricular cavity size. Systolic function is mildly reduced.  Left Atrium: Left atrium is severely dilated.  Right Atrium: Right atrium is severely dilated.  Aortic Valve: The aortic valve is a trileaflet valve.  Mitral Valve: There is bileaflet sclerosis. There is moderate to severe regurgitation.  Tricuspid Valve: There is mild regurgitation.  IVC/SVC: Normal venous pressure at 3 mmHg.    Carotid US (2.7.23):  The study quality is average.   1-39% stenosis in the proximal right internal carotid artery based on Bluth Criteria. Antegrade right vertebral artery flow.   1-39% stenosis in the proximal left internal carotid artery based on Bluth Criteria. Antegrade left vertebral artery flow.     Echocardiogram (11.8.22):  The study quality is average.   The left ventricle is moderately enlarged. Left ventricular diastolic dimension is 6.4 cms. Global left ventricular systolic function is  moderately decreased. The left ventricular ejection fraction is 40%. Arrhythmia noted throughout much of the exam.   There is no evidence of mitral stenosis or prolapse appreciated. MV Ortiz score ~ 5. The area by planimetry is 5.3 cm². The mean trans mitral gradient is 1.6 mmHg. Suspect borderline severe (4+) mitral regurgitation with a posteriorly directed jet. MR PISA radius ~ 0.93 cm, MR ERO ~ 0.35 cm^2, MR regurgitant volume ~ 72 ml/beat, MR regurgitant fraction ~ 55%, MR vena contracta ~ 0.54 cm.  Mild calcification of the aortic valve is noted with adequate cuspal excursion.   Trace pulmonic regurgitation. Mild (1+) tricuspid regurgitation.    Peripheral Angiogram (10.17.22):  Underwent Successful CSI Atherectomy Balloon Angioplasty and Stenting of the Right SFA and CSI Atherectomy Balloon Angioplasty of Right Anterior Tibial Artery Using Pedal Approach.    Peripheral Angiogram (9.12.22):  Underwent Successful Left SFA Laser Atherectomy Balloon Angioplasty Using Left Radial Artery Approach.    Review of Systems   Constitutional: Positive for malaise/fatigue. Negative for fever.   Cardiovascular:  Negative for chest pain and leg swelling.   Respiratory:  Negative for shortness of breath.    Skin:         Scrotal Wound   All other systems reviewed and are negative.    Objective:     Vital Signs (Most Recent):  Temp: 98.3 °F (36.8 °C) (04/02/24 1132)  Pulse: 72 (04/02/24 1253)  Resp: 18 (04/02/24 1301)  BP: 123/66 (04/02/24 1132)  SpO2: (!) 94 % (04/02/24 1253) Vital Signs (24h Range):  Temp:  [97.7 °F (36.5 °C)-98.3 °F (36.8 °C)] 98.3 °F (36.8 °C)  Pulse:  [65-81] 72  Resp:  [16-20] 18  SpO2:  [90 %-97 %] 94 %  BP: (111-133)/(66-83) 123/66   Weight: 95 kg (209 lb 7 oz)  Body mass index is 29.21 kg/m².  SpO2: (!) 94 %       Intake/Output Summary (Last 24 hours) at 4/2/2024 1409  Last data filed at 4/2/2024 0400  Gross per 24 hour   Intake 840 ml   Output 2475 ml   Net -1635 ml       Lines/Drains/Airways        Drain  Duration                  Urethral Catheter 03/18/24 1426 Straight-tip 16 Fr. 14 days              Peripheral Intravenous Line  Duration                  Peripheral IV - Single Lumen 03/24/24 1634 18 G Anterior;Left Forearm 8 days                  Significant Labs:   Recent Results (from the past 72 hour(s))   Triglycerides    Collection Time: 03/31/24  4:10 AM   Result Value Ref Range    Triglyceride 58 34 - 140 mg/dL   Basic Metabolic Panel    Collection Time: 03/31/24  4:10 AM   Result Value Ref Range    Sodium Level 142 136 - 145 mmol/L    Potassium Level 3.4 (L) 3.5 - 5.1 mmol/L    Chloride 103 98 - 107 mmol/L    Carbon Dioxide 25 23 - 31 mmol/L    Glucose Level 169 (H) 82 - 115 mg/dL    Blood Urea Nitrogen 29.2 (H) 8.4 - 25.7 mg/dL    Creatinine 1.09 0.73 - 1.18 mg/dL    BUN/Creatinine Ratio 27     Calcium Level Total 8.9 8.8 - 10.0 mg/dL    Anion Gap 14.0 mEq/L    eGFR >60 mls/min/1.73/m2   Magnesium    Collection Time: 03/31/24  4:10 AM   Result Value Ref Range    Magnesium Level 2.00 1.60 - 2.60 mg/dL   Triglycerides    Collection Time: 04/01/24  3:59 AM   Result Value Ref Range    Triglyceride 55 34 - 140 mg/dL   Basic Metabolic Panel    Collection Time: 04/01/24  3:59 AM   Result Value Ref Range    Sodium Level 141 136 - 145 mmol/L    Potassium Level 4.0 3.5 - 5.1 mmol/L    Chloride 103 98 - 107 mmol/L    Carbon Dioxide 28 23 - 31 mmol/L    Glucose Level 155 (H) 82 - 115 mg/dL    Blood Urea Nitrogen 32.1 (H) 8.4 - 25.7 mg/dL    Creatinine 1.00 0.73 - 1.18 mg/dL    BUN/Creatinine Ratio 32     Calcium Level Total 8.8 8.8 - 10.0 mg/dL    Anion Gap 10.0 mEq/L    eGFR >60 mls/min/1.73/m2   Magnesium    Collection Time: 04/01/24  3:59 AM   Result Value Ref Range    Magnesium Level 1.90 1.60 - 2.60 mg/dL   CBC with Differential    Collection Time: 04/01/24  3:59 AM   Result Value Ref Range    WBC 11.96 (H) 4.50 - 11.50 x10(3)/mcL    RBC 3.00 (L) 4.70 - 6.10 x10(6)/mcL    Hgb 9.1 (L) 14.0 - 18.0 g/dL     Hct 28.7 (L) 42.0 - 52.0 %    MCV 95.7 (H) 80.0 - 94.0 fL    MCH 30.3 27.0 - 31.0 pg    MCHC 31.7 (L) 33.0 - 36.0 g/dL    RDW 17.1 (H) 11.5 - 17.0 %    Platelet 295 130 - 400 x10(3)/mcL    MPV 10.5 (H) 7.4 - 10.4 fL    Neut % 94.0 %    Lymph % 2.0 %    Mono % 3.3 %    Eos % 0.1 %    Basophil % 0.1 %    Lymph # 0.24 (L) 0.6 - 4.6 x10(3)/mcL    Neut # 11.24 (H) 2.1 - 9.2 x10(3)/mcL    Mono # 0.40 0.1 - 1.3 x10(3)/mcL    Eos # 0.01 0 - 0.9 x10(3)/mcL    Baso # 0.01 <=0.2 x10(3)/mcL    IG# 0.06 (H) 0 - 0.04 x10(3)/mcL    IG% 0.5 %    NRBC% 0.0 %   Triglycerides    Collection Time: 04/02/24  4:42 AM   Result Value Ref Range    Triglyceride 60 34 - 140 mg/dL   Magnesium    Collection Time: 04/02/24  4:42 AM   Result Value Ref Range    Magnesium Level 2.10 1.60 - 2.60 mg/dL     Telemetry: PAF/CVR    Physical Exam  Vitals reviewed.   Constitutional:       General: He is not in acute distress.     Appearance: Normal appearance. He is ill-appearing.   HENT:      Head: Normocephalic.      Mouth/Throat:      Mouth: Mucous membranes are moist.   Eyes:      Extraocular Movements: Extraocular movements intact.      Conjunctiva/sclera: Conjunctivae normal.   Cardiovascular:      Rate and Rhythm: Normal rate. Rhythm irregular.      Pulses: Normal pulses.      Heart sounds: Murmur heard.      Comments: Right wrist soft, nontender. No hematoma noted. + 2 peripheral pulse  Pulmonary:      Effort: Pulmonary effort is normal. No respiratory distress.      Breath sounds: Normal breath sounds. No rales.      Comments: 2 L NC  Abdominal:      Palpations: Abdomen is soft.   Musculoskeletal:         General: No swelling.      Right lower leg: No edema.      Left lower leg: No edema.   Skin:     General: Skin is warm and dry.   Neurological:      General: No focal deficit present.      Mental Status: He is alert and oriented to person, place, and time.   Psychiatric:         Mood and Affect: Mood normal.         Behavior: Behavior normal.          Judgment: Judgment normal.       Current Inpatient Medications:    Current Facility-Administered Medications:     0.9%  NaCl infusion (for blood administration), , Intravenous, Q24H PRN, Carmen Griggs FNP    0.9%  NaCl infusion (for blood administration), , Intravenous, Q24H PRN, Joshua Hernandez ANP    acetaminophen tablet 650 mg, 650 mg, Oral, Q8H PRN, Harris Hernandez MD    albuterol-ipratropium 2.5 mg-0.5 mg/3 mL nebulizer solution 3 mL, 3 mL, Nebulization, Q4H, Natali Seymour MD, 3 mL at 04/02/24 1253    amoxicillin-clavulanate 875-125mg per tablet 1 tablet, 1 tablet, Oral, Q8H, Jori Santos MD, 1 tablet at 04/02/24 1223    aspirin EC tablet 81 mg, 81 mg, Oral, Daily, Jori Santos MD, 81 mg at 04/02/24 0930    atorvastatin tablet 80 mg, 80 mg, Oral, Daily, Cassidy Obrien MD, 80 mg at 04/02/24 0929    chlorhexidine 0.12 % solution 15 mL, 15 mL, Mouth/Throat, BID, David Gonsalez MD, 15 mL at 04/02/24 1052    dextrose 10 % infusion, , Intravenous, PRN, David Gonsalez MD    dextrose 10 % infusion, , Intravenous, PRN, David Gonsalez MD    dextrose 10% bolus 125 mL 125 mL, 12.5 g, Intravenous, PRN, David Gonsalez MD    dextrose 10% bolus 250 mL 250 mL, 25 g, Intravenous, PRN, David Gonsalez MD    doxycycline tablet 100 mg, 100 mg, Oral, Q12H, Jori Santos MD, 100 mg at 04/02/24 0929    folic acid tablet 1 mg, 1 mg, Oral, Daily, Jenna Miller MD, 1 mg at 04/02/24 0930    furosemide tablet 40 mg, 40 mg, Oral, Daily, Davida Mo FNP, 40 mg at 04/02/24 1052    hydrALAZINE injection 10 mg, 10 mg, Intravenous, Q4H PRN, Adriano Montiel MD    insulin aspart U-100 injection 0-10 Units, 0-10 Units, Subcutaneous, Q6H PRN, Narendra Pradhan DO    ipratropium 0.02 % nebulizer solution 0.5 mg, 0.5 mg, Nebulization, Q6H PRN, David Gonsalez MD    LORazepam tablet 2 mg, 2 mg, Oral, Q4H PRN, Jenna Miller MD, 2 mg at 04/02/24 0746    melatonin tablet 6 mg, 6 mg, Oral,  Nightly PRN, Harris Hernandez MD, 6 mg at 03/31/24 2151    metoprolol succinate (TOPROL-XL) 24 hr split tablet 12.5 mg, 12.5 mg, Oral, Daily, Davida Mo, MALI, 12.5 mg at 04/02/24 0930    midodrine tablet 10 mg, 10 mg, Oral, TID WM, Narendra Pradhan, DO, 10 mg at 04/02/24 1223    morphine injection 2 mg, 2 mg, Intravenous, Q6H PRN, David Gonsalez MD, 2 mg at 04/02/24 1301    multivitamin tablet, 1 tablet, Oral, Daily, Jenna Miller MD, 1 tablet at 04/02/24 0930    ondansetron injection 4 mg, 4 mg, Intravenous, Q8H PRN, Adriano Montiel MD    oxyCODONE immediate release tablet 5 mg, 5 mg, Oral, Q4H PRN, Harris Hernandez MD, 5 mg at 03/31/24 1301    pantoprazole injection 40 mg, 40 mg, Intravenous, BID, Cassidy Obrien MD, 40 mg at 04/02/24 0929    phenazopyridine tablet 100 mg, 100 mg, Oral, TID WM, Jori Santos MD, 100 mg at 04/02/24 1223    predniSONE tablet 40 mg, 40 mg, Oral, BID, Jori Santos MD, 40 mg at 04/02/24 0929    rivaroxaban tablet 20 mg, 20 mg, Oral, Daily with dinner, Jori Santos MD, 20 mg at 04/01/24 1706    sodium chloride 0.9% flush 10 mL, 10 mL, Intravenous, PRN, Narendra Pradhan DO, 10 mL at 03/29/24 2206    sodium chloride 0.9% flush 10 mL, 10 mL, Intravenous, PRN, aDvid Gonsalez MD    Facility-Administered Medications Ordered in Other Encounters:     0.9%  NaCl infusion, , Intravenous, Continuous, Shannon Milligan MD, Last Rate: 10 mL/hr at 03/09/23 1020, New Bag at 03/09/23 1020    diphenhydrAMINE injection 25 mg, 25 mg, Intravenous, Once PRN, Shannon Milligan MD    LIDOcaine (PF) 10 mg/ml (1%) injection 10 mg, 1 mL, Intradermal, Once, Lanette Ulloa FNP    LIDOcaine (PF) 10 mg/ml (1%) injection 10 mg, 1 mL, Intradermal, Once, Shannon Milligan MD    ondansetron injection 4 mg, 4 mg, Intravenous, Once, Shannon Milligan MD    prochlorperazine injection Soln 5 mg, 5 mg, Intravenous, Once PRN, Shannon Milligan MD  VTE Risk Mitigation  (From admission, onward)           Ordered     rivaroxaban tablet 20 mg  With dinner         04/01/24 1341     IP VTE LOW RISK PATIENT  Once         03/19/24 0422     Place sequential compression device  Until discontinued         03/18/24 0124                  Assessment:   Cardiac Arrest/VT (Post Operative Left Héctor Orchiectomy - in PACU 3.18.24)     - Requiring Multiple Defibrillations (x 3)  NSTEMI Type 2 in the Setting of Cardiac Arrest, Anemia  Anemia - Stable    - Cleared by GI for DAPT/AC    - Requiring Transfusion    - EGD (3.22.24) - LA Grade B Reflux Esophagitis with No Bleeding, Chronic Gastritis, Protonix; Colonoscopy as OP 2/2 Scrotal Wound  Chronic Atrial Fibrillation - Now AF CVR (HR 50's-60s with Intermittent PVCS)    - On Xarelto     - CHADSVASC Score 4 Points   Non Ischemic Cardiomyopathy - Recovered LVEF    - EF 30-40%    - EF 50-55% (3.28.24)  Prolonged QTc  Nonobstructive CAD    - [facility] 3.26.24: Left main 10-20%, LAD 50%  Acute Hypoxemic Respiratory Failure requiring Intubation/Ventilation - Now Extubated on Supplemental O2  Hypotension Requiring Vasopressor Support - Resolved     - History of Hypertension  Valvular Heart Disease    - MR: Moderate, TR: Mild to Moderate  Hyperlipidemia    - On Statin  SIRS - Likely UTI Related - Resolved     - BC x 2 Negative at 5 Days  Scrotal Abscess    - Status Post Surgical Exploration, Left Orchiectomy, & Debridement of Wound/Wound Packing (3.18.24)   COPD  Partial Bowel Obstruction - Resolved   Long Term Oral Anticoagulation  PAD  Infrarenal Abdominal Aortic Aneurysmal Dilatation with Mural Thrombus (Stable)    - Maximum diameter is 3.6 cm without significant interval change   Acute Human Metapneumovirus Infection (On Droplet Precautions) - Off Precautions    Plan:   Echo from 3.27.24 reviewed. No need for further testing.   EKG from this AM demonstrating prolonged Qtc. Discontinue amiodarone. Will reassess in the outpatient setting.   GI Cleared PT for  DAPT/AC. Continue ASA 81 mg daily. Continue FD lovenox for CVA prophylaxis in the setting of PAF. Resume Xarelto prior to discharge.   Continue Toprol XL 12.5 mg daily.   Transition to Lasix 40 mg PO daily. Do not suspect that he will need this at discharge.   Ensure accurate I&O's and daily weights.   Wean O2 as tolerated.   Antibiotic Management/Wound Care as per Primary Team  LVEF improved. He does not qualify for a lifevest.   Keep K > 4 & Mg > 2. Replete Mg.     Davida Mo, MALI  Cardiology  [facility]  04/02/2024

## 2024-04-02 NOTE — PROGRESS NOTES
Hospital Medicine  Progress Note    Patient Name: Ran Reyes Jr.  MRN: [id number]  Status: IP- Inpatient   Admission Date: 3/17/2024  Length of Stay: 15  Date of Service: 04/02/2024       CC: hospital follow-up for respiratory failure       SUBJECTIVE   68-year-old male with a history that includes VHD, nonischemic cardiomyopathy (with an EF of 41%),  chronic AFib on Xarelto, peripheral arterial disease, COPD, and a large left testicle hydrocele, presented to the ED 3/17 complaining of what he thought was rectal bleeding as he was noting blood in the toilet and running down his legs.  Ultimately was found that the bleeding was coming from the posterior aspect of his scrotum from a superficial scrotal ulceration.  He also complained of persistent testicular swelling and pain.  Doppler ultrasound was significantly limited, but could not rule out the presence of torsion.  Urology was consulted and they made decision to proceed with emergent scrotal exploration.  While still in PACU, patient was hypotensive with blood persistently 80s/50s, this despite fluid resuscitation.   Patient subsequently transferred to the ICU for vasopressor support.  Additionally developed cardiac arrest with ventricular tachycardia requiring defibrillation x3 (03/18/2024), with ROSC.   He was continued on IV antibiotics, and eventually weaned off pressors.  Additionally FOB noted to be positive.  GI consulted and planned for EGD.  Cardiology involved as well, with plans for Ischemic evaluation.  Cleared for AC and started on FD lovenox.  Underwent EGD 3/22 noting grade B reflux esophagitis, chronic gastritis, chronic duodenitis.  GI considering outpatient colonoscopy, recommended Carafate for 7 days and continued PPI BID for a month on discharge and then daily for 3 months.  Cardiology on board, awaiting ischemic evaluation.  Urology continued to follow.  Patient continued on IV antibiotics; working with therapy.  On  evening of 3/28 he  developed worsening hypoxemia.  CXR noted bilateral scattered opacities, and he was requiring BiPAP for oxygenation.  Antibiotics broadened, and he was initiated on IV Lasix.  He subsequently diuresed well (a negative fluid balance >5L over 48hrs).  Subsequently transitioned off BiPAP back to supplemental oxygen, and remained stable.     Today: Patient seen and examined at bedside, and chart reviewed.  Oxygenation requirement remain 1-2L.  Patient remains stable, no new issues.      MEDICATIONS   Scheduled   albuterol-ipratropium  3 mL Nebulization Q4H    [START ON 4/3/2024] amiodarone  200 mg Oral Daily    amoxicillin-clavulanate 875-125mg  1 tablet Oral Q8H    aspirin  81 mg Oral Daily    atorvastatin  80 mg Oral Daily    chlorhexidine  15 mL Mouth/Throat BID    doxycycline  100 mg Oral Q12H    folic acid  1 mg Oral Daily    furosemide  40 mg Oral Daily    metoprolol succinate  12.5 mg Oral Daily    midodrine  10 mg Oral TID WM    multivitamin  1 tablet Oral Daily    pantoprazole  40 mg Intravenous BID    phenazopyridine  100 mg Oral TID WM    predniSONE  40 mg Oral BID    rivaroxaban  20 mg Oral Daily with dinner     Continuous Infusions  None      PHYSICAL EXAM   VITALS: T 97.7 °F (36.5 °C)   /83   P 66   RR 16   O2 (!) 92 %    GENERAL: Awake and in NAD  LUNGS: decreased air movement in the very bases, otherwise CTA  CVS: Normal rate  GI/: Soft, NT, bowel sounds positive.  EXTREMITIES: 1+ LE edema  NEURO: AAOx3  PSYCH: Cooperative      LABS   CBC  Recent Labs     04/01/24  0359   WBC 11.96*   RBC 3.00*   HGB 9.1*   HCT 28.7*   MCV 95.7*   MCH 30.3   MCHC 31.7*   RDW 17.1*          CHEM  Recent Labs     03/31/24  0410 04/01/24  0359 04/02/24  0442    141  --    K 3.4* 4.0  --    CHLORIDE 103 103  --    CO2 25 28  --    BUN 29.2* 32.1*  --    CREATININE 1.09 1.00  --    GLUCOSE 169* 155*  --    CALCIUM 8.9 8.8  --    MG 2.00 1.90 2.10           MICROBIOLOGY     Microbiology Results (last 7  days)       Procedure Component Value Units Date/Time    Respiratory Culture [[id number]]     Order Status: Sent Specimen: Sputum, Expectorated               ASSESSMENT   Scrotal abscess, with component of early Claudine's, s/p exploration, I&D, and left orchiectomy 03/18.  Severe sepsis from above, resolving  Questionable RLL post-viral Pneumonia, Human Metapneumovirus positive, resolving  Acute hypoxic respiratory failure from above  Cardiac arrest with ventricular tachycardia requiring defibrillation x3 (03/18/2024)  Acute on chronic diastolic heart failure  AFib RVR  NSTMI, unspecified  Partial bowel obstruction  Normocytic anemia.    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

## 2024-04-02 NOTE — PROGRESS NOTES
Inpatient Nutrition Assessment    Admit Date: 3/17/2024   Total duration of encounter: 16 days   Patient Age: 68 y.o.    Nutrition Recommendation/Prescription     Continue current diet (Diet Heart Healthy) as tolerated.   Continue folic acid and multivitamin supplement as able.   Boost Plus (provides 360 kcal, 14 g protein per serving) TID (chocolate)  Ge (provides 90 kcal, 2.5 g protein per serving) BID.    Communication of Recommendations: reviewed with patient    Nutrition Assessment     Malnutrition Assessment/Nutrition-Focused Physical Exam    Malnutrition Context: acute illness or injury (03/19/24 1033)  Malnutrition Level:  (does not meet criteria) (03/19/24 1033)  Energy Intake (Malnutrition):  (unable to eval) (03/19/24 1033)  Weight Loss (Malnutrition):  (unable to eval) (03/19/24 1033)  Subcutaneous Fat (Malnutrition):  (does not meet criteria) (03/19/24 1033)           Muscle Mass (Malnutrition):  (does not meet criteria) (03/19/24 1033)                          Fluid Accumulation (Malnutrition):  (does not meet criteria) (03/19/24 1033)        A minimum of two characteristics is recommended for diagnosis of either severe or non-severe malnutrition.    Chart Review    Reason Seen: follow-up    Malnutrition Screening Tool Results   Have you recently lost weight without trying?: No  Have you been eating poorly because of a decreased appetite?: No   MST Score: 0   Diagnosis:  Scrotal abscess s/p I&D with left joselito orchiectomy 03/18/2024  Abnormal chest imaging with right lower lobe infiltrate, concern for post-viral pneumonia  Human metapneumovirus +ve  Acute hypoxic respiratory failure from above  Sepsis from above requiring vasopressor supoort  Recent cardiac arrest with ventricular tachycardia requiring defibrillation x3 (03/18/2024)  AFib RVR   NSTMI, unspecified  Partial bowel obstruction  Normocytic anemia.    FOBT positive - grade B reflux esophagitis, chronic gastritis, chronic  duodenitis.  Electrolyte derangements   Abnormal CTA - abdominal aortic aneurysmal dilatation  with mural thrombus  ELIZABETH    Relevant Medical History: nonischemic cardiomyopathy, systolic heart failure with an EF of 41%, VHD, chronic AFib on Xarelto, peripheral arterial disease, COPD, HTN, and a large left testicle hydrocel     Scheduled Medications:  albuterol-ipratropium, 3 mL, Q4H  [START ON 4/3/2024] amiodarone, 200 mg, Daily  amoxicillin-clavulanate 875-125mg, 1 tablet, Q8H  aspirin, 81 mg, Daily  atorvastatin, 80 mg, Daily  chlorhexidine, 15 mL, BID  doxycycline, 100 mg, Q12H  folic acid, 1 mg, Daily  furosemide, 40 mg, Daily  metoprolol succinate, 12.5 mg, Daily  midodrine, 10 mg, TID WM  multivitamin, 1 tablet, Daily  pantoprazole, 40 mg, BID  phenazopyridine, 100 mg, TID WM  predniSONE, 40 mg, BID  rivaroxaban, 20 mg, Daily with dinner    Continuous Infusions: none       PRN Medications: 0.9%  NaCl infusion (for blood administration), 0.9%  NaCl infusion (for blood administration), acetaminophen, dextrose 10 % in water (D10W), dextrose 10 % in water (D10W), dextrose 10%, dextrose 10%, hydrALAZINE, insulin aspart U-100, ipratropium, LORazepam, melatonin, morphine, ondansetron, oxyCODONE, sodium chloride 0.9%, sodium chloride 0.9%    Calorie Containing IV Medications: no significant kcals from medications at this time    Recent Labs   Lab 03/27/24  0422 03/28/24  0352 03/29/24  0040 03/29/24  0502 03/30/24  0420 03/31/24  0410 04/01/24  0359 04/02/24  0442    141 142  --  140 142 141  --    K 3.7 4.2 4.7  --  3.7 3.4* 4.0  --    CALCIUM 8.7* 8.3* 8.9  --  9.0 8.9 8.8  --    PHOS 2.9 3.1 4.1  --   --   --   --   --    MG 2.00 2.00 2.00  --  2.00 2.00 1.90 2.10   CHLORIDE 106 107 106  --  104 103 103  --    CO2 24 26 24  --  24 25 28  --    BUN 9.7 10.1 14.9  --  24.2 29.2* 32.1*  --    CREATININE 1.05 0.93 1.03  --  1.02 1.09 1.00  --    EGFRNORACEVR >60 >60 >60  --  >60 >60 >60  --    GLUCOSE 92 87 106   "--  166* 169* 155*  --    BILITOT 0.8 0.6 0.8  --   --   --   --   --    ALKPHOS 68 65 80  --   --   --   --   --    ALT 9 7 8  --   --   --   --   --    AST 18 15 20  --   --   --   --   --    ALBUMIN 2.4* 2.4* 2.8*  --   --   --   --   --    TRIG 60 57  --  46 41 58 55 60   WBC 7.81 8.06  8.06 10.55  --  13.71*  --  11.96*  --    HGB 8.5* 8.4* 9.2*  --  8.9*  --  9.1*  --    HCT 26.6* 26.9* 29.5*  --  27.7*  --  28.7*  --        Nutrition Orders:  Diet Heart Healthy  Dietary nutrition supplements Ge - Fruit Punch, Boost Plus Chocolate; TID     Appetite/Oral Intake: good/% of meals  Factors Affecting Nutritional Intake: none identified  Food/Catholic/Cultural Preferences: unable to obtain  Food Allergies: none reported  Last Bowel Movement: 24  Wound(s): incision noted    Comments    3/19/24: Discussed with RN. Will provide tube feeding recommendations for when appropriate to start. No kcal from meds at this time. Noted partial bowel obstruction. Will monitor for need for PN.     3/21/24: Pt now extubated. Verified UBW. Plans for diet advancement. Full liquids just started. Pt willing to drink ONS. Will also send Ge for wound healing.     3/27/24 Patient reports a good appetite and good intake of meals/supplements.    24 Patient with good appetite and PO intakes. Pt noted to have some diarrhea yesterday.     Anthropometrics    Height: 5' 11" (180.3 cm), Height Method: Stated  Last Weight: 95 kg (209 lb 7 oz) (24 1315), Weight Method: Bed Scale  BMI (Calculated): 29.2  BMI Classification: overweight (BMI 25-29.9)        Ideal Body Weight (IBW), Male: 172 lb     % Ideal Body Weight, Male (lb): 115.1 %                 Usual Body Weight (UBW), k.45 kg-90.7 kg  % Usual Body Weight: 107.63  % Weight Change From Usual Weight: 7.4 %  Usual Weight Provided By: patient and patient denies unintentional weight loss    Wt Readings from Last 5 Encounters:   24 95 kg (209 lb 7 oz) "   01/31/24 92.1 kg (203 lb)   01/04/24 92.4 kg (203 lb 11.3 oz)   11/15/23 90.7 kg (200 lb)   09/20/23 91.2 kg (201 lb)     Weight Change(s) Since Admission:   (3/27) admission weight (77.1 kg) 'estimated', likely inaccurate, bed weight (95.0 kg) taken during rounds  Wt Readings from Last 1 Encounters:   03/27/24 1315 95 kg (209 lb 7 oz)   03/25/24 0340 89.8 kg (197 lb 15.6 oz)   03/22/24 1346 89.8 kg (198 lb)   03/21/24 1113 90 kg (198 lb 6.6 oz)   03/18/24 2202 90 kg (198 lb 6.6 oz)   03/17/24 2036 77.1 kg (170 lb)   Admit Weight: 77.1 kg (170 lb) (03/17/24 2036), Weight Method: Estimated    Enteral Nutrition Patient not receiving enteral nutrition at this time.    Parenteral Nutrition Patient not receiving parenteral nutrition support at this time.    Evaluation of Received Nutrient Intake    Calories: meeting estimated needs  Protein: meeting estimated needs    Patient Education Not applicable.    Nutrition Diagnosis     PES: Inadequate oral intake related to acute illness as evidenced by NPO since extubation. (resolved)     Nutrition Interventions     Intervention(s): general/healthful diet, commercial beverage, and collaboration with other providers    Goal: Meet greater than 80% of nutritional needs by follow-up. (goal met)  Goal: Tolerate enteral feeding at goal rate by follow-up. (goal discontinued)    Nutrition Goals & Monitoring     Dietitian will monitor: food and beverage intake and weight    Nutrition Risk/Follow-Up: low (follow-up in 5-7 days)   Please consult if re-assessment needed sooner.

## 2024-04-03 LAB — TRIGL SERPL-MCNC: 57 MG/DL (ref 34–140)

## 2024-04-03 PROCEDURE — 63600175 PHARM REV CODE 636 W HCPCS: Performed by: INTERNAL MEDICINE

## 2024-04-03 PROCEDURE — 25000242 PHARM REV CODE 250 ALT 637 W/ HCPCS: Performed by: INTERNAL MEDICINE

## 2024-04-03 PROCEDURE — 21400001 HC TELEMETRY ROOM

## 2024-04-03 PROCEDURE — 94760 N-INVAS EAR/PLS OXIMETRY 1: CPT

## 2024-04-03 PROCEDURE — 93010 ELECTROCARDIOGRAM REPORT: CPT | Mod: ,,, | Performed by: INTERNAL MEDICINE

## 2024-04-03 PROCEDURE — 25000003 PHARM REV CODE 250: Performed by: STUDENT IN AN ORGANIZED HEALTH CARE EDUCATION/TRAINING PROGRAM

## 2024-04-03 PROCEDURE — 99900035 HC TECH TIME PER 15 MIN (STAT)

## 2024-04-03 PROCEDURE — 25000003 PHARM REV CODE 250

## 2024-04-03 PROCEDURE — 63600175 PHARM REV CODE 636 W HCPCS

## 2024-04-03 PROCEDURE — 93005 ELECTROCARDIOGRAM TRACING: CPT

## 2024-04-03 PROCEDURE — 99900031 HC PATIENT EDUCATION (STAT)

## 2024-04-03 PROCEDURE — C9113 INJ PANTOPRAZOLE SODIUM, VIA: HCPCS | Performed by: INTERNAL MEDICINE

## 2024-04-03 PROCEDURE — 25000003 PHARM REV CODE 250: Performed by: INTERNAL MEDICINE

## 2024-04-03 PROCEDURE — 84478 ASSAY OF TRIGLYCERIDES: CPT

## 2024-04-03 PROCEDURE — 27000221 HC OXYGEN, UP TO 24 HOURS

## 2024-04-03 PROCEDURE — 94640 AIRWAY INHALATION TREATMENT: CPT

## 2024-04-03 PROCEDURE — 94761 N-INVAS EAR/PLS OXIMETRY MLT: CPT

## 2024-04-03 RX ORDER — PREDNISONE 20 MG/1
40 TABLET ORAL DAILY
Status: DISCONTINUED | OUTPATIENT
Start: 2024-04-04 | End: 2024-04-04

## 2024-04-03 RX ADMIN — IPRATROPIUM BROMIDE AND ALBUTEROL SULFATE 3 ML: 2.5; .5 SOLUTION RESPIRATORY (INHALATION) at 08:04

## 2024-04-03 RX ADMIN — IPRATROPIUM BROMIDE AND ALBUTEROL SULFATE 3 ML: 2.5; .5 SOLUTION RESPIRATORY (INHALATION) at 01:04

## 2024-04-03 RX ADMIN — MORPHINE SULFATE 2 MG: 4 INJECTION, SOLUTION INTRAMUSCULAR; INTRAVENOUS at 11:04

## 2024-04-03 RX ADMIN — METOPROLOL SUCCINATE 12.5 MG: 25 TABLET, EXTENDED RELEASE ORAL at 08:04

## 2024-04-03 RX ADMIN — PHENAZOPYRIDINE HYDROCHLORIDE 100 MG: 100 TABLET ORAL at 12:04

## 2024-04-03 RX ADMIN — IPRATROPIUM BROMIDE AND ALBUTEROL SULFATE 3 ML: 2.5; .5 SOLUTION RESPIRATORY (INHALATION) at 07:04

## 2024-04-03 RX ADMIN — CHLORHEXIDINE GLUCONATE 0.12% ORAL RINSE 15 ML: 1.2 LIQUID ORAL at 08:04

## 2024-04-03 RX ADMIN — DOXYCYCLINE HYCLATE 100 MG: 100 TABLET, COATED ORAL at 08:04

## 2024-04-03 RX ADMIN — MIDODRINE HYDROCHLORIDE 10 MG: 5 TABLET ORAL at 12:04

## 2024-04-03 RX ADMIN — MIDODRINE HYDROCHLORIDE 10 MG: 5 TABLET ORAL at 04:04

## 2024-04-03 RX ADMIN — THERA TABS 1 TABLET: TAB at 08:04

## 2024-04-03 RX ADMIN — ATORVASTATIN CALCIUM 80 MG: 40 TABLET, FILM COATED ORAL at 08:04

## 2024-04-03 RX ADMIN — FOLIC ACID 1 MG: 1 TABLET ORAL at 08:04

## 2024-04-03 RX ADMIN — AMOXICILLIN AND CLAVULANATE POTASSIUM 1 TABLET: 875; 125 TABLET, FILM COATED ORAL at 05:04

## 2024-04-03 RX ADMIN — IPRATROPIUM BROMIDE AND ALBUTEROL SULFATE 3 ML: 2.5; .5 SOLUTION RESPIRATORY (INHALATION) at 03:04

## 2024-04-03 RX ADMIN — PHENAZOPYRIDINE HYDROCHLORIDE 100 MG: 100 TABLET ORAL at 08:04

## 2024-04-03 RX ADMIN — PHENAZOPYRIDINE HYDROCHLORIDE 100 MG: 100 TABLET ORAL at 04:04

## 2024-04-03 RX ADMIN — FUROSEMIDE 40 MG: 40 TABLET ORAL at 08:04

## 2024-04-03 RX ADMIN — RIVAROXABAN 20 MG: 10 TABLET, FILM COATED ORAL at 04:04

## 2024-04-03 RX ADMIN — PANTOPRAZOLE SODIUM 40 MG: 40 INJECTION, POWDER, FOR SOLUTION INTRAVENOUS at 08:04

## 2024-04-03 RX ADMIN — MORPHINE SULFATE 2 MG: 4 INJECTION, SOLUTION INTRAMUSCULAR; INTRAVENOUS at 09:04

## 2024-04-03 RX ADMIN — LORAZEPAM 2 MG: 1 TABLET ORAL at 08:04

## 2024-04-03 RX ADMIN — ASPIRIN 81 MG: 81 TABLET, COATED ORAL at 08:04

## 2024-04-03 RX ADMIN — IPRATROPIUM BROMIDE AND ALBUTEROL SULFATE 3 ML: 2.5; .5 SOLUTION RESPIRATORY (INHALATION) at 11:04

## 2024-04-03 RX ADMIN — MIDODRINE HYDROCHLORIDE 10 MG: 5 TABLET ORAL at 08:04

## 2024-04-03 RX ADMIN — PREDNISONE 40 MG: 20 TABLET ORAL at 08:04

## 2024-04-03 NOTE — PROGRESS NOTES
Hospital Medicine  Progress Note    Patient Name: Ran Reyes Jr.  MRN: [id number]  Status: IP- Inpatient   Admission Date: 3/17/2024  Length of Stay: 16  Date of Service: 04/03/2024       CC: hospital follow-up for respiratory failure       SUBJECTIVE   68-year-old male with a history that includes VHD, nonischemic cardiomyopathy (with an EF of 41%),  chronic AFib on Xarelto, peripheral arterial disease, COPD, and a large left testicle hydrocele, presented to the ED 3/17 complaining of what he thought was rectal bleeding as he was noting blood in the toilet and running down his legs.  Ultimately was found that the bleeding was coming from the posterior aspect of his scrotum from a superficial scrotal ulceration.  He also complained of persistent testicular swelling and pain.  Doppler ultrasound was significantly limited, but could not rule out the presence of torsion.  Urology was consulted and they made decision to proceed with emergent scrotal exploration.  While still in PACU, patient was hypotensive with blood persistently 80s/50s, this despite fluid resuscitation.   Patient subsequently transferred to the ICU for vasopressor support.  Additionally developed cardiac arrest with ventricular tachycardia requiring defibrillation x3 (03/18/2024), with ROSC.   He was continued on IV antibiotics, and eventually weaned off pressors.  Additionally FOB noted to be positive.  GI consulted and planned for EGD.  Cardiology involved as well, with plans for Ischemic evaluation.  Cleared for AC and started on FD lovenox.  Underwent EGD 3/22 noting grade B reflux esophagitis, chronic gastritis, chronic duodenitis.  GI considering outpatient colonoscopy, recommended Carafate for 7 days and continued PPI BID for a month on discharge and then daily for 3 months.  Cardiology on board, awaiting ischemic evaluation.  Urology continued to follow.  Patient continued on IV antibiotics; working with therapy.  On  evening of 3/28 he  developed worsening hypoxemia.  CXR noted bilateral scattered opacities, and he was requiring BiPAP for oxygenation.  Antibiotics broadened, and he was initiated on IV Lasix.  He subsequently diuresed well (a negative fluid balance >5L over 48hrs).  Subsequently transitioned off BiPAP back to supplemental oxygen, and remained stable.     Today: Patient seen and examined at bedside, and chart reviewed.  Oxygen at 3L today.  Has done well with therapy, they have signed off.  Patient remains stable, no new issues.      MEDICATIONS   Scheduled   albuterol-ipratropium  3 mL Nebulization Q4H    aspirin  81 mg Oral Daily    atorvastatin  80 mg Oral Daily    chlorhexidine  15 mL Mouth/Throat BID    folic acid  1 mg Oral Daily    furosemide  40 mg Oral Daily    metoprolol succinate  12.5 mg Oral Daily    midodrine  10 mg Oral TID WM    multivitamin  1 tablet Oral Daily    pantoprazole  40 mg Intravenous BID    phenazopyridine  100 mg Oral TID WM    [START ON 4/4/2024] predniSONE  40 mg Oral Daily    rivaroxaban  20 mg Oral Daily with dinner     Continuous Infusions  None      PHYSICAL EXAM   VITALS: T 97.7 °F (36.5 °C)   /88   P 71   RR 18   O2 96 %    GENERAL: Awake and in NAD  LUNGS: decreased air movement in the very bases, otherwise CTA  CVS: Normal rate  GI/: Soft, NT, bowel sounds positive.  EXTREMITIES: 1+ LE edema  NEURO: AAOx3  PSYCH: Cooperative      LABS   CBC  Recent Labs     04/01/24  0359   WBC 11.96*   RBC 3.00*   HGB 9.1*   HCT 28.7*   MCV 95.7*   MCH 30.3   MCHC 31.7*   RDW 17.1*          CHEM  Recent Labs     04/01/24  0359 04/02/24  0442     --    K 4.0  --    CHLORIDE 103  --    CO2 28  --    BUN 32.1*  --    CREATININE 1.00  --    GLUCOSE 155*  --    CALCIUM 8.8  --    MG 1.90 2.10           MICROBIOLOGY     Microbiology Results (last 7 days)       Procedure Component Value Units Date/Time    Respiratory Culture [[id number]]     Order Status: Sent Specimen: Sputum, Expectorated                ASSESSMENT   Scrotal abscess, with component of early Claudine's, s/p exploration, I&D, and left orchiectomy 03/18.  Severe sepsis from above, resolving  Questionable RLL post-viral Pneumonia, Human Metapneumovirus positive, resolving  Acute hypoxic respiratory failure from above  Cardiac arrest with ventricular tachycardia requiring defibrillation x3 (03/18/2024)  Acute on chronic diastolic heart failure  AFib RVR  NSTMI, unspecified  Partial bowel obstruction  Normocytic anemia.    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  Otherwise continue current management and monitoring in the interim      Prophylaxis: Xarelto        Jori Santos MD  [facility]

## 2024-04-03 NOTE — CARE UPDATE
[id number] Rec consult for  services. FOC obtained. Referral sent to [facility] thru care port. Pt may dc tomorrow

## 2024-04-03 NOTE — NURSING
[facility] 4th Floor Medical Telemetry  Wound Care    Patient Name:  Ran Reyes Jr.   MRN:  [id number]  Date: 4/3/2024  Diagnosis: Scrotal hematoma    History:     Past Medical History:   Diagnosis Date    A-fib     Anticoagulant long-term use     Aortic aneurysm     Cataract     CHF (congestive heart failure)     Chronic atrial fibrillation     COPD (chronic obstructive pulmonary disease)     Coronary artery disease     HLD (hyperlipidemia)     Hypertension     Mitral regurgitation     PAD (peripheral artery disease)     Primary open angle glaucoma (POAG)        Social History     Socioeconomic History    Marital status: Single   Tobacco Use    Smoking status: Every Day     Current packs/day: 0.25     Average packs/day: 0.3 packs/day for 40.0 years (10.0 ttl pk-yrs)     Types: Cigarettes     Passive exposure: Current    Smokeless tobacco: Never   Substance and Sexual Activity    Alcohol use: Yes     Alcohol/week: 3.0 standard drinks of alcohol     Types: 3 Cans of beer per week     Comment: socially    Drug use: Yes     Frequency: 1.0 times per week     Types: Marijuana     Comment: no use in over 2 months     Social Determinants of Health     Financial Resource Strain: Patient Unable To Answer (3/19/2024)    Overall Financial Resource Strain (CARDIA)     Difficulty of Paying Living Expenses: Patient unable to answer   Food Insecurity: Patient Unable To Answer (3/19/2024)    Hunger Vital Sign     Worried About Running Out of Food in the Last Year: Patient unable to answer     Ran Out of Food in the Last Year: Patient unable to answer   Transportation Needs: Patient Unable To Answer (3/19/2024)    PRAPARE - Transportation     Lack of Transportation (Medical): Patient unable to answer     Lack of Transportation (Non-Medical): Patient unable to answer   Physical Activity: Inactive (11/30/2022)    Exercise Vital Sign     Days of Exercise per Week: 0 days     Minutes of Exercise per Session: 0 min    Stress: Patient Unable To Answer (3/19/2024)    [place] [employer] - Occupational Stress Questionnaire     Feeling of Stress : Patient unable to answer   Social Connections: Socially Isolated (11/30/2022)    Social Connection and Isolation Panel [NHANES]     Frequency of Communication with Friends and Family: Never     Frequency of Social Gatherings with Friends and Family: Never     Attends Jainism Services: More than 4 times per year     Active Member of Clubs or Organizations: No     Attends Club or Organization Meetings: Never     Marital Status: Never    Housing Stability: Patient Unable To Answer (3/19/2024)    Housing Stability Vital Sign     Unable to Pay for Housing in the Last Year: Patient unable to answer     Unstable Housing in the Last Year: Patient unable to answer       Precautions:     Allergies as of 03/17/2024    (No Known Allergies)       [facility] Assessment Details/Treatment      04/03/24 1116   Pain/Comfort/Sleep   POSS (Pasero Opioid-Induced Sed Scale) 1 - Awake and alert   Pain Reassessment   Pain Rating Prior to Med Admin 7   RASS (Reyes Agitation-Sedation Scale)   RASS (Reyes Agitation-Sedation Scale) 0   Brief Confusion Assessment Method (bCAM)   Feature 3: Altered Level of Consciousness Negative        Incision/Site 03/18/24 1649 Left Scrotum lateral vertical   Date First Assessed/Time First Assessed: 03/18/24 1649   Side: Left  Location: Scrotum  Orientation: lateral  Incision Type: vertical  Closure Method: Other (see comments)  Additional Comments: left open - packed with dakins soaked kerlix, fluffs, ABD...   Wound Image    Dressing Appearance Intact;Moist drainage   Drainage Amount Small   Drainage Characteristics/Odor Serous;No odor   Appearance Red;Yellow;Moist;Granulating   Red (%), Wound Tissue Color 90 %   Periwound Area Dry;Intact   Wound Edges Irregular   Wound Length (cm) 7 cm   Wound Width (cm) 6 cm   Wound Depth (cm) 1.4 cm   Wound Volume  (cm^3) 58.8 cm^3   Wound Surface Area (cm^2) 42 cm^2   Undermining (depth (cm)/location) 12-6 with deeping about 5pm at 3cm   Care Cleansed with:;Antimicrobial agent   Dressing Gauze, wet to dry  (with dakins sol.)     Re-eval of left scrotal surgical site.  - see new photo.   Pt is awake alert mobil.   He was premedicated  with ms before by primary nurse.  He tolerated well.   No changes to care at this time.     04/03/2024

## 2024-04-03 NOTE — PROGRESS NOTES
"  [facility]    Cardiology  Progress Note    Patient Name: Ran Reyes Jr.  MRN: [id number]  Admission Date: 3/17/2024  Hospital Length of Stay: 16 days  Code Status: Full Code   Attending Physician: Jori Santos MD   Primary Care Physician: Abiodun Recinos DO  Expected Discharge Date:   Principal Problem:Scrotal hematoma    Subjective:   Chief Complaint/Reason for Consult: OAC recs     HPI: Ran Reyes Jr. Is a 68 year old male, known to Dr. Wagner, with PMH of  cardiomyopathy, systolic heart failure with an EF of 41%, VHD, chronic AFib on Xarelto, peripheral arterial disease, COPD, HTN, and a large left testicle hydrocele who presented to the ED 3/18/24 for scrotal bleeding with testicular swelling and pain. Found to have sepsis likely due to UTI, acute bronchitis. Also found in ED to be in Afib RVR on EKG, bp 90s/60s. BNP 1273, troponin 0.045 --> 0.055. Patient admits to dyspnea, cough, and wheezing. Denies chest pains. Cardiology being consulted for OAC recommendations.    Hospital Course:  3.19.24: Intubated/Mechanical Ventilation. AF SVR. On Vasopressor Support. Family at bedside.   3.20.24: NAD. Vented/Sedated. PAF/CVR. Dobutamine 2.5mcg/kg/min. Amiodarone 0.5mg/min.   3.21.24: NAD. Extubated. "I am ok." PAF/CVR. Levophed 0.01mcg/kg/min. Oral Amiodarone.   3.22.24: NAD. "I am feeling better." PAF/SVR but Mostly CVR. Off Pressors.   3.23.24: NAD. "I am good." PAF/SVR - Mostly CVR. No Profound Bradycardia since Yesterday. Remains off Pressors.   3.24.24: NAD. "I am fine." PAF/SVR, Mostly CVR in 60s, PVCs. H&H 7.3/23.3 - Transfusion of PRBCs.  3.25.24: NAD. "I am ok." PAF/SVR. Mostly CVR 60s-70s. H&H 8.7/26.2; GI Clear for DAPT/AC  3.26.24: NAD. Sitting in Bedside Chair. "I am feeling pretty good." H&H 8.5/26.8 s/p 1 Unit PRBCS on 3.25.24. Simple Face Mask. Denies CP, SOB and Palps.   3.27.24: NAD. Denies CP, SOB, or palps. Right wrist benign. "I feel good." H&H stable. On 5 L face mask. " "  3.28.24: NAD. Denies Cp, SOB, or palps. AF CVR on tele. BP stable. On 3 L NC. "I am okay."   3.29.24: NAD. Currently on Bipap. AF CVR on tele. Episode of hypoxia overnight. Started on BIPAP. CXR demonstrating right middle lobe infiltrate consistent with PNA. Started on antibiotics and IV antibiotics.   3.30.24: NAD. Reports feeling much better. Down to 2 L NC. Daily net negative 2544 mL/24 hours. AF CVR on tele. BP stable.   3.31.24: NAD. "I feel fine." 2 L NC. Inaccurate I&O's but 2000 mL output documented yesterday evening. AF CVR on tele. BP stable. K 3.4.   4.1.24: NAD. Denies CP, SOB, or palps. Voided 2700 mL/24 hours. AF CVR on tele. BUN increasing. K improved. "I feel good."   4.2.24: NAD. Episode of CP this AM. EKG was ordered and demonstrated prolonged Qtc. AF CVR on tele. Daily net negative 1635 mL/24 hours.      PMH: Cardiomyopathy, systolic heart failure with an EF of 41%, VHD, chronic AFib on Xarelto, peripheral arterial disease, COPD, HTN, CAD (Details Unclear)  PSH: cardiac stent >10 years ago, L FA atherectomy and angioplasty, R SFA stent, cataract extraction,   Family History: Father MI at age 66.   Social History: 10+ pack year hx current smoker, social EtOH, denies drugs.     Previous Cardiac Diagnostics:   TTE (3.28.24):  Left Ventricle: The left ventricle is normal in size. Moderately increased wall thickness. There is low normal systolic function with a visually estimated ejection fraction of 50 - 55%. There is diastolic dysfunction.  Mitral Valve: There is moderate regurgitation.    LHC (3.27.24):  Left Atrium: Agitated saline study of the atrial septum is faintly positive. Small amount of late bubbles crossing only with valsalva images. Could be via pulmonary transit. No clear atrial level shunt.      LHC (3.26.24):  Coronary findings:  Dominance: right   Left main:  10-20% calcified distal left main disease  Left anterior descending artery:  50% calcified proximal stenosis  Circumflex " artery:  Luminal irregularities  Right coronary artery:  Luminal irregularities    Echocardiogram (3.19.24):  Left Ventricle: The left ventricle is normal in size. Increased wall thickness. Unable to assess wall motion. There is moderately reduced systolic function with a visually estimated ejection fraction of 30 - 40%.  Right Ventricle: Mild right ventricular enlargement.  Left Atrium: Left atrium is moderately dilated.  Right Atrium: Right atrium is severely dilated.  Mitral Valve: There is moderate regurgitation.  Tricuspid Valve: There is mild to moderate regurgitation.  Pulmonary Artery: Pulmonary artery pressure could not be estimated.  IVC/SVC: Patient is ventilated, cannot use IVC diameter to estimate right atrial pressure.    Echocardiogram (1.31.24):   Left Ventricle: The left ventricle is normal in size. Mildly increased wall thickness. There is reduced systolic function. Biplane (2D) method of discs ejection fraction is 41%. Unable to assess diastolic function due to atrial fibrillation.  Right Ventricle: Normal right ventricular cavity size. Systolic function is mildly reduced.  Left Atrium: Left atrium is severely dilated.  Right Atrium: Right atrium is severely dilated.  Aortic Valve: The aortic valve is a trileaflet valve.  Mitral Valve: There is bileaflet sclerosis. There is moderate to severe regurgitation.  Tricuspid Valve: There is mild regurgitation.  IVC/SVC: Normal venous pressure at 3 mmHg.    Carotid US (2.7.23):  The study quality is average.   1-39% stenosis in the proximal right internal carotid artery based on Bluth Criteria. Antegrade right vertebral artery flow.   1-39% stenosis in the proximal left internal carotid artery based on Bluth Criteria. Antegrade left vertebral artery flow.     Echocardiogram (11.8.22):  The study quality is average.   The left ventricle is moderately enlarged. Left ventricular diastolic dimension is 6.4 cms. Global left ventricular systolic function is  moderately decreased. The left ventricular ejection fraction is 40%. Arrhythmia noted throughout much of the exam.   There is no evidence of mitral stenosis or prolapse appreciated. MV Ortiz score ~ 5. The area by planimetry is 5.3 cm². The mean trans mitral gradient is 1.6 mmHg. Suspect borderline severe (4+) mitral regurgitation with a posteriorly directed jet. MR PISA radius ~ 0.93 cm, MR ERO ~ 0.35 cm^2, MR regurgitant volume ~ 72 ml/beat, MR regurgitant fraction ~ 55%, MR vena contracta ~ 0.54 cm.  Mild calcification of the aortic valve is noted with adequate cuspal excursion.   Trace pulmonic regurgitation. Mild (1+) tricuspid regurgitation.    Peripheral Angiogram (10.17.22):  Underwent Successful CSI Atherectomy Balloon Angioplasty and Stenting of the Right SFA and CSI Atherectomy Balloon Angioplasty of Right Anterior Tibial Artery Using Pedal Approach.    Peripheral Angiogram (9.12.22):  Underwent Successful Left SFA Laser Atherectomy Balloon Angioplasty Using Left Radial Artery Approach.    Review of Systems   Constitutional: Positive for malaise/fatigue. Negative for fever.   Cardiovascular:  Negative for chest pain and leg swelling.   Respiratory:  Negative for shortness of breath.    Skin:         Scrotal Wound   All other systems reviewed and are negative.    Objective:     Vital Signs (Most Recent):  Temp: 98.2 °F (36.8 °C) (04/03/24 1142)  Pulse: 85 (04/03/24 1142)  Resp: 20 (04/03/24 1140)  BP: 134/86 (04/03/24 1142)  SpO2: (!) 91 % (04/03/24 1142) Vital Signs (24h Range):  Temp:  [97.7 °F (36.5 °C)-98.2 °F (36.8 °C)] 98.2 °F (36.8 °C)  Pulse:  [64-85] 85  Resp:  [18-20] 20  SpO2:  [91 %-96 %] 91 %  BP: (100-138)/(51-88) 134/86   Weight: 95 kg (209 lb 7 oz)  Body mass index is 29.21 kg/m².  SpO2: (!) 91 %       Intake/Output Summary (Last 24 hours) at 4/3/2024 1509  Last data filed at 4/3/2024 1116  Gross per 24 hour   Intake 1680 ml   Output 2750 ml   Net -1070 ml       Lines/Drains/Airways        Drain  Duration                  Urethral Catheter 03/18/24 1426 Straight-tip 16 Fr. 16 days              Peripheral Intravenous Line  Duration                  Peripheral IV - Single Lumen 03/24/24 1634 18 G Anterior;Left Forearm 9 days                  Significant Labs:   Recent Results (from the past 72 hour(s))   Triglycerides    Collection Time: 04/01/24  3:59 AM   Result Value Ref Range    Triglyceride 55 34 - 140 mg/dL   Basic Metabolic Panel    Collection Time: 04/01/24  3:59 AM   Result Value Ref Range    Sodium Level 141 136 - 145 mmol/L    Potassium Level 4.0 3.5 - 5.1 mmol/L    Chloride 103 98 - 107 mmol/L    Carbon Dioxide 28 23 - 31 mmol/L    Glucose Level 155 (H) 82 - 115 mg/dL    Blood Urea Nitrogen 32.1 (H) 8.4 - 25.7 mg/dL    Creatinine 1.00 0.73 - 1.18 mg/dL    BUN/Creatinine Ratio 32     Calcium Level Total 8.8 8.8 - 10.0 mg/dL    Anion Gap 10.0 mEq/L    eGFR >60 mls/min/1.73/m2   Magnesium    Collection Time: 04/01/24  3:59 AM   Result Value Ref Range    Magnesium Level 1.90 1.60 - 2.60 mg/dL   CBC with Differential    Collection Time: 04/01/24  3:59 AM   Result Value Ref Range    WBC 11.96 (H) 4.50 - 11.50 x10(3)/mcL    RBC 3.00 (L) 4.70 - 6.10 x10(6)/mcL    Hgb 9.1 (L) 14.0 - 18.0 g/dL    Hct 28.7 (L) 42.0 - 52.0 %    MCV 95.7 (H) 80.0 - 94.0 fL    MCH 30.3 27.0 - 31.0 pg    MCHC 31.7 (L) 33.0 - 36.0 g/dL    RDW 17.1 (H) 11.5 - 17.0 %    Platelet 295 130 - 400 x10(3)/mcL    MPV 10.5 (H) 7.4 - 10.4 fL    Neut % 94.0 %    Lymph % 2.0 %    Mono % 3.3 %    Eos % 0.1 %    Basophil % 0.1 %    Lymph # 0.24 (L) 0.6 - 4.6 x10(3)/mcL    Neut # 11.24 (H) 2.1 - 9.2 x10(3)/mcL    Mono # 0.40 0.1 - 1.3 x10(3)/mcL    Eos # 0.01 0 - 0.9 x10(3)/mcL    Baso # 0.01 <=0.2 x10(3)/mcL    IG# 0.06 (H) 0 - 0.04 x10(3)/mcL    IG% 0.5 %    NRBC% 0.0 %   Triglycerides    Collection Time: 04/02/24  4:42 AM   Result Value Ref Range    Triglyceride 60 34 - 140 mg/dL   Magnesium    Collection Time: 04/02/24  4:42 AM    Result Value Ref Range    Magnesium Level 2.10 1.60 - 2.60 mg/dL   EKG 12-lead    Collection Time: 04/02/24  8:13 AM   Result Value Ref Range    QRS Duration 104 ms    [facility] QTC Calculation 524 ms   Triglycerides    Collection Time: 04/03/24  3:37 AM   Result Value Ref Range    Triglyceride 57 34 - 140 mg/dL     Telemetry: PAF/CVR    Physical Exam  Vitals reviewed.   Constitutional:       General: He is not in acute distress.     Appearance: Normal appearance. He is ill-appearing.   HENT:      Head: Normocephalic.      Mouth/Throat:      Mouth: Mucous membranes are moist.   Eyes:      Extraocular Movements: Extraocular movements intact.      Conjunctiva/sclera: Conjunctivae normal.   Cardiovascular:      Rate and Rhythm: Normal rate. Rhythm irregular.      Pulses: Normal pulses.      Heart sounds: Murmur heard.      Comments: Right wrist soft, nontender. No hematoma noted. + 2 peripheral pulse  Pulmonary:      Effort: Pulmonary effort is normal. No respiratory distress.      Breath sounds: Normal breath sounds. No rales.      Comments: 2 L NC  Abdominal:      Palpations: Abdomen is soft.   Musculoskeletal:         General: No swelling.      Right lower leg: No edema.      Left lower leg: No edema.   Skin:     General: Skin is warm and dry.   Neurological:      General: No focal deficit present.      Mental Status: He is alert and oriented to person, place, and time.   Psychiatric:         Mood and Affect: Mood normal.         Behavior: Behavior normal.         Judgment: Judgment normal.       Current Inpatient Medications:    Current Facility-Administered Medications:     0.9%  NaCl infusion (for blood administration), , Intravenous, Q24H PRN, Carmen Griggs, FNP    0.9%  NaCl infusion (for blood administration), , Intravenous, Q24H PRN, Joshua Hernandez, RNA    acetaminophen tablet 650 mg, 650 mg, Oral, Q8H PRN, Harris Hernandez MD    albuterol-ipratropium 2.5 mg-0.5 mg/3 mL nebulizer solution 3 mL, 3 mL,  Nebulization, Q4H, Natali Seymour MD, 3 mL at 04/03/24 1140    aspirin EC tablet 81 mg, 81 mg, Oral, Daily, Jori Santos MD, 81 mg at 04/03/24 0811    atorvastatin tablet 80 mg, 80 mg, Oral, Daily, Cassidy Obrien MD, 80 mg at 04/03/24 0811    chlorhexidine 0.12 % solution 15 mL, 15 mL, Mouth/Throat, BID, David Gonsalez MD, 15 mL at 04/03/24 0811    dextrose 10 % infusion, , Intravenous, PRN, David Gonsalez MD    dextrose 10 % infusion, , Intravenous, PRN, David Gonsalez MD    dextrose 10% bolus 125 mL 125 mL, 12.5 g, Intravenous, PRN, David Gonsalez MD    dextrose 10% bolus 250 mL 250 mL, 25 g, Intravenous, PRN, David Gonsalez MD    folic acid tablet 1 mg, 1 mg, Oral, Daily, Jenna Miller MD, 1 mg at 04/03/24 0811    furosemide tablet 40 mg, 40 mg, Oral, Daily, Davida Mo FNP, 40 mg at 04/03/24 0811    hydrALAZINE injection 10 mg, 10 mg, Intravenous, Q4H PRN, Adriano Montiel MD    insulin aspart U-100 injection 0-10 Units, 0-10 Units, Subcutaneous, Q6H PRN, Narendra Pradhan DO    ipratropium 0.02 % nebulizer solution 0.5 mg, 0.5 mg, Nebulization, Q6H PRN, David Gonsalez MD    LORazepam tablet 2 mg, 2 mg, Oral, Q4H PRN, Jenna Miller MD, 2 mg at 04/03/24 0811    melatonin tablet 6 mg, 6 mg, Oral, Nightly PRN, Harris Hernandez MD, 6 mg at 03/31/24 2151    metoprolol succinate (TOPROL-XL) 24 hr split tablet 12.5 mg, 12.5 mg, Oral, Daily, Davida Mo FNP, 12.5 mg at 04/03/24 0811    midodrine tablet 10 mg, 10 mg, Oral, TID WM, Narendra Pradhan, , 10 mg at 04/03/24 1242    morphine injection 2 mg, 2 mg, Intravenous, Q6H PRN, David Gonsalez MD, 2 mg at 04/03/24 1116    multivitamin tablet, 1 tablet, Oral, Daily, Jenna Miller MD, 1 tablet at 04/03/24 0811    ondansetron injection 4 mg, 4 mg, Intravenous, Q8H PRN, Adriano Montiel MD    oxyCODONE immediate release tablet 5 mg, 5 mg, Oral, Q4H PRN, Harris Hernandez MD, 5 mg at 03/31/24 1301     pantoprazole injection 40 mg, 40 mg, Intravenous, BID, Cassidy Obrien MD, 40 mg at 04/03/24 0811    phenazopyridine tablet 100 mg, 100 mg, Oral, TID WM, Jori Santos MD, 100 mg at 04/03/24 1242    [START ON 4/4/2024] predniSONE tablet 40 mg, 40 mg, Oral, Daily, Jori Santos MD    rivaroxaban tablet 20 mg, 20 mg, Oral, Daily with dinner, Joir Santos MD, 20 mg at 04/02/24 1709    sodium chloride 0.9% flush 10 mL, 10 mL, Intravenous, PRN, Narendra Pradhan DO, 10 mL at 03/29/24 2206    sodium chloride 0.9% flush 10 mL, 10 mL, Intravenous, PRN, David Gonsalez MD    Facility-Administered Medications Ordered in Other Encounters:     0.9%  NaCl infusion, , Intravenous, Continuous, Shannon Milligan MD, Last Rate: 10 mL/hr at 03/09/23 1020, New Bag at 03/09/23 1020    diphenhydrAMINE injection 25 mg, 25 mg, Intravenous, Once PRN, Shannon Milligan MD    LIDOcaine (PF) 10 mg/ml (1%) injection 10 mg, 1 mL, Intradermal, Once, Lanette Ulloa FNP    LIDOcaine (PF) 10 mg/ml (1%) injection 10 mg, 1 mL, Intradermal, Once, Shannon Milligan MD    ondansetron injection 4 mg, 4 mg, Intravenous, Once, Shannon Milligan MD    prochlorperazine injection Soln 5 mg, 5 mg, Intravenous, Once PRN, Shannon Milligan MD  VTE Risk Mitigation (From admission, onward)           Ordered     rivaroxaban tablet 20 mg  With dinner         04/01/24 1341     IP VTE LOW RISK PATIENT  Once         03/19/24 0422     Place sequential compression device  Until discontinued         03/18/24 0124                  Assessment:   Cardiac Arrest/VT (Post Operative Left Héctor Orchiectomy - in PACU 3.18.24)     - Requiring Multiple Defibrillations (x 3)  NSTEMI Type 2 in the Setting of Cardiac Arrest, Anemia  Anemia - Stable    - Cleared by GI for DAPT/AC    - Requiring Transfusion    - EGD (3.22.24) - LA Grade B Reflux Esophagitis with No Bleeding, Chronic Gastritis, Protonix; Colonoscopy as OP 2/2 Scrotal Wound  Chronic Atrial  Fibrillation - Now AF CVR (HR 50's-60s with Intermittent PVCS)    - On Xarelto     - CHADSVASC Score 4 Points   Non Ischemic Cardiomyopathy - Recovered LVEF    - EF 30-40%    - EF 50-55% (3.28.24)  Prolonged QTc  Nonobstructive CAD    - [facility] 3.26.24: Left main 10-20%, LAD 50%  Acute Hypoxemic Respiratory Failure requiring Intubation/Ventilation - Now Extubated on Supplemental O2  Hypotension Requiring Vasopressor Support - Resolved     - History of Hypertension  Valvular Heart Disease    - MR: Moderate, TR: Mild to Moderate  Hyperlipidemia    - On Statin  SIRS - Likely UTI Related - Resolved     - BC x 2 Negative at 5 Days  Scrotal Abscess    - Status Post Surgical Exploration, Left Orchiectomy, & Debridement of Wound/Wound Packing (3.18.24)   COPD  Partial Bowel Obstruction - Resolved   Long Term Oral Anticoagulation  PAD  Infrarenal Abdominal Aortic Aneurysmal Dilatation with Mural Thrombus (Stable)    - Maximum diameter is 3.6 cm without significant interval change   Acute Human Metapneumovirus Infection (On Droplet Precautions) - Off Precautions  No Hx of GIB    Plan:   Case discussed with Dr. Rosado.   Echo from 3.27.24 reviewed. No need for further testing.   EKG from this morning demonstrating that Prolonged QTC has improved. Continue to hold amiodarone. Will repeat EKG in the outpatient setting.   Continue ASA & Xarelto (for CVA prophylaxis in the setting of PAF).   Continue Toprol XL 12.5 mg daily.   Continue Lasix 40 mg PO daily. Do not suspect that he will need this at discharge.   Ensure accurate I&O's and daily weights.   Wean O2 as tolerated.   Antibiotic Management/Wound Care as per Primary Team  LVEF improved. He does not qualify for a lifevest.   CBC, CMP, & EKG in 3-5 days.   F/U with Dr. Wagner at discharge.     No further recommendations from cardiac standpoint. We will sign off. Please don't hesitate to reach out with questions or concerns.     Davida Mo, P  Cardiology  [facility]  04/03/2024

## 2024-04-04 LAB
ANION GAP SERPL CALC-SCNC: 9 MEQ/L
BUN SERPL-MCNC: 30 MG/DL (ref 8.4–25.7)
CALCIUM SERPL-MCNC: 8.4 MG/DL (ref 8.8–10)
CHLORIDE SERPL-SCNC: 104 MMOL/L (ref 98–107)
CO2 SERPL-SCNC: 25 MMOL/L (ref 23–31)
CREAT SERPL-MCNC: 0.88 MG/DL (ref 0.73–1.18)
CREAT/UREA NIT SERPL: 34
GFR SERPLBLD CREATININE-BSD FMLA CKD-EPI: >60 MLS/MIN/1.73/M2
GLUCOSE SERPL-MCNC: 101 MG/DL (ref 82–115)
OHS QRS DURATION: 96 MS
OHS QTC CALCULATION: 487 MS
POTASSIUM SERPL-SCNC: 4 MMOL/L (ref 3.5–5.1)
SODIUM SERPL-SCNC: 138 MMOL/L (ref 136–145)
TRIGL SERPL-MCNC: 57 MG/DL (ref 34–140)

## 2024-04-04 PROCEDURE — 84478 ASSAY OF TRIGLYCERIDES: CPT

## 2024-04-04 PROCEDURE — 25000003 PHARM REV CODE 250: Performed by: INTERNAL MEDICINE

## 2024-04-04 PROCEDURE — 25000003 PHARM REV CODE 250: Performed by: STUDENT IN AN ORGANIZED HEALTH CARE EDUCATION/TRAINING PROGRAM

## 2024-04-04 PROCEDURE — 99900035 HC TECH TIME PER 15 MIN (STAT)

## 2024-04-04 PROCEDURE — 94640 AIRWAY INHALATION TREATMENT: CPT

## 2024-04-04 PROCEDURE — 25000003 PHARM REV CODE 250

## 2024-04-04 PROCEDURE — 21400001 HC TELEMETRY ROOM

## 2024-04-04 PROCEDURE — 99900031 HC PATIENT EDUCATION (STAT)

## 2024-04-04 PROCEDURE — 25000242 PHARM REV CODE 250 ALT 637 W/ HCPCS: Performed by: INTERNAL MEDICINE

## 2024-04-04 PROCEDURE — 27000221 HC OXYGEN, UP TO 24 HOURS

## 2024-04-04 PROCEDURE — 94760 N-INVAS EAR/PLS OXIMETRY 1: CPT

## 2024-04-04 PROCEDURE — 94761 N-INVAS EAR/PLS OXIMETRY MLT: CPT

## 2024-04-04 PROCEDURE — 80048 BASIC METABOLIC PNL TOTAL CA: CPT | Performed by: INTERNAL MEDICINE

## 2024-04-04 PROCEDURE — 63600175 PHARM REV CODE 636 W HCPCS: Performed by: HOSPITALIST

## 2024-04-04 PROCEDURE — C9113 INJ PANTOPRAZOLE SODIUM, VIA: HCPCS | Performed by: INTERNAL MEDICINE

## 2024-04-04 PROCEDURE — 63600175 PHARM REV CODE 636 W HCPCS: Performed by: INTERNAL MEDICINE

## 2024-04-04 PROCEDURE — 63600175 PHARM REV CODE 636 W HCPCS

## 2024-04-04 RX ORDER — PREDNISONE 20 MG/1
20 TABLET ORAL DAILY
Status: DISCONTINUED | OUTPATIENT
Start: 2024-04-04 | End: 2024-04-08

## 2024-04-04 RX ADMIN — Medication 6 MG: at 09:04

## 2024-04-04 RX ADMIN — IPRATROPIUM BROMIDE AND ALBUTEROL SULFATE 3 ML: 2.5; .5 SOLUTION RESPIRATORY (INHALATION) at 03:04

## 2024-04-04 RX ADMIN — IPRATROPIUM BROMIDE AND ALBUTEROL SULFATE 3 ML: 2.5; .5 SOLUTION RESPIRATORY (INHALATION) at 04:04

## 2024-04-04 RX ADMIN — PHENAZOPYRIDINE HYDROCHLORIDE 100 MG: 100 TABLET ORAL at 08:04

## 2024-04-04 RX ADMIN — CHLORHEXIDINE GLUCONATE 0.12% ORAL RINSE 15 ML: 1.2 LIQUID ORAL at 08:04

## 2024-04-04 RX ADMIN — PREDNISONE 20 MG: 20 TABLET ORAL at 08:04

## 2024-04-04 RX ADMIN — IPRATROPIUM BROMIDE AND ALBUTEROL SULFATE 3 ML: 2.5; .5 SOLUTION RESPIRATORY (INHALATION) at 07:04

## 2024-04-04 RX ADMIN — PANTOPRAZOLE SODIUM 40 MG: 40 INJECTION, POWDER, FOR SOLUTION INTRAVENOUS at 09:04

## 2024-04-04 RX ADMIN — LORAZEPAM 2 MG: 1 TABLET ORAL at 09:04

## 2024-04-04 RX ADMIN — FUROSEMIDE 40 MG: 40 TABLET ORAL at 08:04

## 2024-04-04 RX ADMIN — THERA TABS 1 TABLET: TAB at 08:04

## 2024-04-04 RX ADMIN — PHENAZOPYRIDINE HYDROCHLORIDE 100 MG: 100 TABLET ORAL at 05:04

## 2024-04-04 RX ADMIN — MIDODRINE HYDROCHLORIDE 10 MG: 5 TABLET ORAL at 05:04

## 2024-04-04 RX ADMIN — FOLIC ACID 1 MG: 1 TABLET ORAL at 08:04

## 2024-04-04 RX ADMIN — IPRATROPIUM BROMIDE AND ALBUTEROL SULFATE 3 ML: 2.5; .5 SOLUTION RESPIRATORY (INHALATION) at 12:04

## 2024-04-04 RX ADMIN — METOPROLOL SUCCINATE 12.5 MG: 25 TABLET, EXTENDED RELEASE ORAL at 08:04

## 2024-04-04 RX ADMIN — RIVAROXABAN 20 MG: 10 TABLET, FILM COATED ORAL at 05:04

## 2024-04-04 RX ADMIN — IPRATROPIUM BROMIDE AND ALBUTEROL SULFATE 3 ML: 2.5; .5 SOLUTION RESPIRATORY (INHALATION) at 11:04

## 2024-04-04 RX ADMIN — PHENAZOPYRIDINE HYDROCHLORIDE 100 MG: 100 TABLET ORAL at 12:04

## 2024-04-04 RX ADMIN — CHLORHEXIDINE GLUCONATE 0.12% ORAL RINSE 15 ML: 1.2 LIQUID ORAL at 09:04

## 2024-04-04 RX ADMIN — MIDODRINE HYDROCHLORIDE 10 MG: 5 TABLET ORAL at 12:04

## 2024-04-04 RX ADMIN — IPRATROPIUM BROMIDE AND ALBUTEROL SULFATE 3 ML: 2.5; .5 SOLUTION RESPIRATORY (INHALATION) at 08:04

## 2024-04-04 RX ADMIN — MORPHINE SULFATE 2 MG: 4 INJECTION, SOLUTION INTRAMUSCULAR; INTRAVENOUS at 02:04

## 2024-04-04 RX ADMIN — ATORVASTATIN CALCIUM 80 MG: 40 TABLET, FILM COATED ORAL at 08:04

## 2024-04-04 RX ADMIN — PANTOPRAZOLE SODIUM 40 MG: 40 INJECTION, POWDER, FOR SOLUTION INTRAVENOUS at 08:04

## 2024-04-04 RX ADMIN — ASPIRIN 81 MG: 81 TABLET, COATED ORAL at 08:04

## 2024-04-04 RX ADMIN — MIDODRINE HYDROCHLORIDE 10 MG: 5 TABLET ORAL at 08:04

## 2024-04-04 NOTE — PROGRESS NOTES
[facility] Hospital Medicine Progress Note        Chief Complaint: Inpatient Follow-up     HPI:   68-year-old male with a history that includes VHD, nonischemic cardiomyopathy (with an EF of 41%),  chronic AFib on Xarelto, peripheral arterial disease, COPD, and a large left testicle hydrocele, presented to the ED 3/17 complaining of what he thought was rectal bleeding as he was noting blood in the toilet and running down his legs.  Ultimately was found that the bleeding was coming from the posterior aspect of his scrotum from a superficial scrotal ulceration.  He also complained of persistent testicular swelling and pain.  Doppler ultrasound was significantly limited, but could not rule out the presence of torsion.  Urology was consulted and they made decision to proceed with emergent scrotal exploration.  While still in PACU, patient was hypotensive with blood persistently 80s/50s, this despite fluid resuscitation.   Patient subsequently transferred to the ICU for vasopressor support.  Additionally developed cardiac arrest with ventricular tachycardia requiring defibrillation x3 (03/18/2024), with ROSC.   He was continued on IV antibiotics, and eventually weaned off pressors.  Additionally FOB noted to be positive.  GI consulted and planned for EGD.  Cardiology involved as well, with plans for Ischemic evaluation.  Cleared for AC and started on FD lovenox.  Underwent EGD 3/22 noting grade B reflux esophagitis, chronic gastritis, chronic duodenitis.  GI considering outpatient colonoscopy, recommended Carafate for 7 days and continued PPI BID for a month on discharge and then daily for 3 months.  Cardiology on board, awaiting ischemic evaluation.  Urology continued to follow.  Patient continued on IV antibiotics; working with therapy.  On  evening of 3/28 he developed worsening hypoxemia.  CXR noted bilateral scattered opacities, and he was requiring BiPAP for oxygenation.  Antibiotics  broadened, and he was initiated on IV Lasix.  He subsequently diuresed well (a negative fluid balance >5L over 48hrs).  Subsequently transitioned off BiPAP back to supplemental oxygen, and remained stable.     Interval Hx:  No significant changes overnight.  Resting comfortably in bed.  On supplemental O2.    Objective/physical exam:  General: In no acute distress, afebrile  Chest: Clear to auscultation bilaterally  Heart: RRR, +S1, S2, no appreciable murmur  Abdomen: Soft, nontender, BS +  MSK: Warm, no lower extremity edema, no clubbing or cyanosis  Neurologic: Alert and oriented x4, Cranial nerve II-XII intact, Strength 5/5 in all 4 extremities    VITAL SIGNS: 24 HRS MIN & MAX LAST   Temp  Min: 97.6 °F (36.4 °C)  Max: 98.3 °F (36.8 °C) 97.7 °F (36.5 °C)   BP  Min: 127/72  Max: 155/99 (!) 155/99   Pulse  Min: 66  Max: 108  108   Resp  Min: 18  Max: 20 18   SpO2  Min: 91 %  Max: 98 % (!) 93 %       Recent Labs   Lab 03/29/24  0040 03/30/24  0420 04/01/24  0359   WBC 10.55 13.71* 11.96*   RBC 3.02* 2.91* 3.00*   HGB 9.2* 8.9* 9.1*   HCT 29.5* 27.7* 28.7*   MCV 97.7* 95.2* 95.7*   MCH 30.5 30.6 30.3   MCHC 31.2* 32.1* 31.7*   RDW 17.2* 16.9 17.1*    339 295   MPV 10.0 10.3 10.5*       Recent Labs   Lab 03/28/24  2300 03/29/24  0040 03/29/24  0125 03/30/24  0420 03/31/24  0410 04/01/24  0359 04/02/24  0442 04/04/24  0407   NA  --  142  --    < > 142 141  --  138   K  --  4.7  --    < > 3.4* 4.0  --  4.0   CO2  --  24  --    < > 25 28  --  25   BUN  --  14.9  --    < > 29.2* 32.1*  --  30.0*   CREATININE  --  1.03  --    < > 1.09 1.00  --  0.88   CALCIUM  --  8.9  --    < > 8.9 8.8  --  8.4*   PH 7.420  --  7.430  --   --   --   --   --    MG  --  2.00  --    < > 2.00 1.90 2.10  --    ALBUMIN  --  2.8*  --   --   --   --   --   --    ALKPHOS  --  80  --   --   --   --   --   --    ALT  --  8  --   --   --   --   --   --    AST  --  20  --   --   --   --   --   --    BILITOT  --  0.8  --   --   --   --   --   --      < > = values in this interval not displayed.          Microbiology Results (last 7 days)       Procedure Component Value Units Date/Time    Respiratory Culture [[id number]]     Order Status: Sent Specimen: Sputum, Expectorated              Radiology:  X-Ray Chest 1 View  Narrative: EXAMINATION:  XR CHEST 1 VIEW    CLINICAL HISTORY:  resp. distress;    COMPARISON:  03/27/2024    FINDINGS:  Single view of the chest shows patchy bilateral alveolar opacities, increased from the prior study.  No pneumothorax.  Trace left pleural fluid is present.  Heart is enlarged.  Aorta is partially calcified.  Impression: New patchy alveolar opacities in both lungs suggestive of atypical infection/edema    Findings are compatible with the preliminary report.    Electronically signed by: David Lipscomb MD  Date:    03/29/2024  Time:    09:47      Scheduled Med:   albuterol-ipratropium  3 mL Nebulization Q4H    aspirin  81 mg Oral Daily    atorvastatin  80 mg Oral Daily    chlorhexidine  15 mL Mouth/Throat BID    folic acid  1 mg Oral Daily    furosemide  40 mg Oral Daily    metoprolol succinate  12.5 mg Oral Daily    midodrine  10 mg Oral TID WM    multivitamin  1 tablet Oral Daily    pantoprazole  40 mg Intravenous BID    phenazopyridine  100 mg Oral TID WM    predniSONE  40 mg Oral Daily    rivaroxaban  20 mg Oral Daily with dinner        Continuous Infusions:       PRN Meds:  0.9%  NaCl infusion (for blood administration), 0.9%  NaCl infusion (for blood administration), acetaminophen, dextrose 10 % in water (D10W), dextrose 10 % in water (D10W), dextrose 10%, dextrose 10%, hydrALAZINE, insulin aspart U-100, ipratropium, LORazepam, melatonin, morphine, ondansetron, oxyCODONE, sodium chloride 0.9%, sodium chloride 0.9%       Assessment/Plan:   Scrotal abscess, with component of early Claudine's, s/p exploration, I&D, and left orchiectomy 03/18.  Severe sepsis from above, resolving  Questionable RLL post-viral Pneumonia, Human  Metapneumovirus positive, resolving  Acute hypoxic respiratory failure from above  Cardiac arrest with ventricular tachycardia requiring defibrillation x3 (03/18/2024)  Acute on chronic diastolic heart failure  AFib RVR  NSTMI, unspecified  Partial bowel obstruction  Normocytic anemia.    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  Neurology recommending continue Dobbs catheter until follow up in clinic.    Continue with wound care.    Will continue tapering his steroids.  Bump down to 20 mg today  Patient was on Xarelto, aspirin, and statin.  Cardiology following.  Amiodarone has been discontinued and he was currently rate controlled with beta-blocker.  Continue to wean his O2 as tolerated.  He was spent most of the day yesterday on 2 L.  Appears that he requires a little bit more overnight.  Blood pressure stable.    Case management arranging for home health and home O2.      Tha Edmond MD   04/04/2024     All diagnosis and differential diagnosis have been reviewed; assessment and plan has been documented; I have personally reviewed the labs and test results that are presently available; I have reviewed the patients medication list; I have reviewed the consulting providers response and recommendations. I have reviewed or attempted to review medical records based upon their availability    All of the patient's questions have been  addressed and answered. Patient's is agreeable to the above stated plan. I will continue to monitor closely and make adjustments to medical management as needed.  _____________________________________________________________________

## 2024-04-05 LAB
ANION GAP SERPL CALC-SCNC: 9 MEQ/L
BASOPHILS # BLD AUTO: 0 X10(3)/MCL
BASOPHILS NFR BLD AUTO: 0 %
BUN SERPL-MCNC: 30.9 MG/DL (ref 8.4–25.7)
CALCIUM SERPL-MCNC: 8.6 MG/DL (ref 8.8–10)
CHLORIDE SERPL-SCNC: 102 MMOL/L (ref 98–107)
CO2 SERPL-SCNC: 30 MMOL/L (ref 23–31)
CREAT SERPL-MCNC: 0.85 MG/DL (ref 0.73–1.18)
CREAT/UREA NIT SERPL: 36
EOSINOPHIL # BLD AUTO: 0.11 X10(3)/MCL (ref 0–0.9)
EOSINOPHIL NFR BLD AUTO: 1.8 %
ERYTHROCYTE [DISTWIDTH] IN BLOOD BY AUTOMATED COUNT: 16.8 % (ref 11.5–17)
GFR SERPLBLD CREATININE-BSD FMLA CKD-EPI: >60 MLS/MIN/1.73/M2
GLUCOSE SERPL-MCNC: 109 MG/DL (ref 82–115)
HCT VFR BLD AUTO: 30.5 % (ref 42–52)
HGB BLD-MCNC: 9.4 G/DL (ref 14–18)
IMM GRANULOCYTES # BLD AUTO: 0.03 X10(3)/MCL (ref 0–0.04)
IMM GRANULOCYTES NFR BLD AUTO: 0.5 %
LYMPHOCYTES # BLD AUTO: 1.14 X10(3)/MCL (ref 0.6–4.6)
LYMPHOCYTES NFR BLD AUTO: 18.4 %
MCH RBC QN AUTO: 30.3 PG (ref 27–31)
MCHC RBC AUTO-ENTMCNC: 30.8 G/DL (ref 33–36)
MCV RBC AUTO: 98.4 FL (ref 80–94)
MONOCYTES # BLD AUTO: 0.69 X10(3)/MCL (ref 0.1–1.3)
MONOCYTES NFR BLD AUTO: 11.1 %
NEUTROPHILS # BLD AUTO: 4.24 X10(3)/MCL (ref 2.1–9.2)
NEUTROPHILS NFR BLD AUTO: 68.2 %
NRBC BLD AUTO-RTO: 0 %
PLATELET # BLD AUTO: 200 X10(3)/MCL (ref 130–400)
PLATELETS.RETICULATED NFR BLD AUTO: 5.1 % (ref 0.9–11.2)
PMV BLD AUTO: 11.5 FL (ref 7.4–10.4)
POTASSIUM SERPL-SCNC: 4 MMOL/L (ref 3.5–5.1)
RBC # BLD AUTO: 3.1 X10(6)/MCL (ref 4.7–6.1)
SODIUM SERPL-SCNC: 141 MMOL/L (ref 136–145)
TRIGL SERPL-MCNC: 42 MG/DL (ref 34–140)
WBC # SPEC AUTO: 6.21 X10(3)/MCL (ref 4.5–11.5)

## 2024-04-05 PROCEDURE — 80048 BASIC METABOLIC PNL TOTAL CA: CPT | Performed by: HOSPITALIST

## 2024-04-05 PROCEDURE — 25000003 PHARM REV CODE 250

## 2024-04-05 PROCEDURE — 84478 ASSAY OF TRIGLYCERIDES: CPT

## 2024-04-05 PROCEDURE — 25000003 PHARM REV CODE 250: Performed by: INTERNAL MEDICINE

## 2024-04-05 PROCEDURE — 99900035 HC TECH TIME PER 15 MIN (STAT)

## 2024-04-05 PROCEDURE — 27000221 HC OXYGEN, UP TO 24 HOURS

## 2024-04-05 PROCEDURE — 63600175 PHARM REV CODE 636 W HCPCS: Performed by: HOSPITALIST

## 2024-04-05 PROCEDURE — 25000003 PHARM REV CODE 250: Performed by: STUDENT IN AN ORGANIZED HEALTH CARE EDUCATION/TRAINING PROGRAM

## 2024-04-05 PROCEDURE — 63600175 PHARM REV CODE 636 W HCPCS

## 2024-04-05 PROCEDURE — 94640 AIRWAY INHALATION TREATMENT: CPT

## 2024-04-05 PROCEDURE — 94761 N-INVAS EAR/PLS OXIMETRY MLT: CPT

## 2024-04-05 PROCEDURE — 94760 N-INVAS EAR/PLS OXIMETRY 1: CPT

## 2024-04-05 PROCEDURE — 25000242 PHARM REV CODE 250 ALT 637 W/ HCPCS: Performed by: INTERNAL MEDICINE

## 2024-04-05 PROCEDURE — C9113 INJ PANTOPRAZOLE SODIUM, VIA: HCPCS | Performed by: INTERNAL MEDICINE

## 2024-04-05 PROCEDURE — 63600175 PHARM REV CODE 636 W HCPCS: Performed by: INTERNAL MEDICINE

## 2024-04-05 PROCEDURE — 99900031 HC PATIENT EDUCATION (STAT)

## 2024-04-05 PROCEDURE — 21400001 HC TELEMETRY ROOM

## 2024-04-05 PROCEDURE — 85025 COMPLETE CBC W/AUTO DIFF WBC: CPT | Performed by: HOSPITALIST

## 2024-04-05 RX ADMIN — IPRATROPIUM BROMIDE AND ALBUTEROL SULFATE 3 ML: 2.5; .5 SOLUTION RESPIRATORY (INHALATION) at 03:04

## 2024-04-05 RX ADMIN — CHLORHEXIDINE GLUCONATE 0.12% ORAL RINSE 15 ML: 1.2 LIQUID ORAL at 10:04

## 2024-04-05 RX ADMIN — METOPROLOL SUCCINATE 12.5 MG: 25 TABLET, EXTENDED RELEASE ORAL at 10:04

## 2024-04-05 RX ADMIN — RIVAROXABAN 20 MG: 10 TABLET, FILM COATED ORAL at 04:04

## 2024-04-05 RX ADMIN — MORPHINE SULFATE 2 MG: 4 INJECTION, SOLUTION INTRAMUSCULAR; INTRAVENOUS at 10:04

## 2024-04-05 RX ADMIN — ASPIRIN 81 MG: 81 TABLET, COATED ORAL at 10:04

## 2024-04-05 RX ADMIN — FOLIC ACID 1 MG: 1 TABLET ORAL at 10:04

## 2024-04-05 RX ADMIN — MIDODRINE HYDROCHLORIDE 10 MG: 5 TABLET ORAL at 10:04

## 2024-04-05 RX ADMIN — ATORVASTATIN CALCIUM 80 MG: 40 TABLET, FILM COATED ORAL at 10:04

## 2024-04-05 RX ADMIN — PANTOPRAZOLE SODIUM 40 MG: 40 INJECTION, POWDER, FOR SOLUTION INTRAVENOUS at 10:04

## 2024-04-05 RX ADMIN — MIDODRINE HYDROCHLORIDE 10 MG: 5 TABLET ORAL at 04:04

## 2024-04-05 RX ADMIN — OXYCODONE HYDROCHLORIDE 5 MG: 5 TABLET ORAL at 04:04

## 2024-04-05 RX ADMIN — IPRATROPIUM BROMIDE AND ALBUTEROL SULFATE 3 ML: 2.5; .5 SOLUTION RESPIRATORY (INHALATION) at 07:04

## 2024-04-05 RX ADMIN — THERA TABS 1 TABLET: TAB at 10:04

## 2024-04-05 RX ADMIN — IPRATROPIUM BROMIDE AND ALBUTEROL SULFATE 3 ML: 2.5; .5 SOLUTION RESPIRATORY (INHALATION) at 08:04

## 2024-04-05 RX ADMIN — Medication 6 MG: at 09:04

## 2024-04-05 RX ADMIN — PREDNISONE 20 MG: 20 TABLET ORAL at 10:04

## 2024-04-05 RX ADMIN — PHENAZOPYRIDINE HYDROCHLORIDE 100 MG: 100 TABLET ORAL at 04:04

## 2024-04-05 RX ADMIN — PHENAZOPYRIDINE HYDROCHLORIDE 100 MG: 100 TABLET ORAL at 10:04

## 2024-04-05 RX ADMIN — CHLORHEXIDINE GLUCONATE 0.12% ORAL RINSE 15 ML: 1.2 LIQUID ORAL at 09:04

## 2024-04-05 RX ADMIN — PANTOPRAZOLE SODIUM 40 MG: 40 INJECTION, POWDER, FOR SOLUTION INTRAVENOUS at 09:04

## 2024-04-05 RX ADMIN — FUROSEMIDE 40 MG: 40 TABLET ORAL at 10:04

## 2024-04-05 RX ADMIN — IPRATROPIUM BROMIDE AND ALBUTEROL SULFATE 3 ML: 2.5; .5 SOLUTION RESPIRATORY (INHALATION) at 12:04

## 2024-04-05 NOTE — PROGRESS NOTES
[facility] Hospital Medicine Progress Note        Chief Complaint: Inpatient Follow-up     HPI:   68-year-old male with a history that includes VHD, nonischemic cardiomyopathy (with an EF of 41%),  chronic AFib on Xarelto, peripheral arterial disease, COPD, and a large left testicle hydrocele, presented to the ED 3/17 complaining of what he thought was rectal bleeding as he was noting blood in the toilet and running down his legs.  Ultimately was found that the bleeding was coming from the posterior aspect of his scrotum from a superficial scrotal ulceration.  He also complained of persistent testicular swelling and pain.  Doppler ultrasound was significantly limited, but could not rule out the presence of torsion.  Urology was consulted and they made decision to proceed with emergent scrotal exploration.  While still in PACU, patient was hypotensive with blood persistently 80s/50s, this despite fluid resuscitation.   Patient subsequently transferred to the ICU for vasopressor support.  Additionally developed cardiac arrest with ventricular tachycardia requiring defibrillation x3 (03/18/2024), with ROSC.   He was continued on IV antibiotics, and eventually weaned off pressors.  Additionally FOB noted to be positive.  GI consulted and planned for EGD.  Cardiology involved as well, with plans for Ischemic evaluation.  Cleared for AC and started on FD lovenox.  Underwent EGD 3/22 noting grade B reflux esophagitis, chronic gastritis, chronic duodenitis.  GI considering outpatient colonoscopy, recommended Carafate for 7 days and continued PPI BID for a month on discharge and then daily for 3 months.  Cardiology on board, awaiting ischemic evaluation.  Urology continued to follow.  Patient continued on IV antibiotics; working with therapy.  On  evening of 3/28 he developed worsening hypoxemia.  CXR noted bilateral scattered opacities, and he was requiring BiPAP for oxygenation.  Antibiotics  broadened, and he was initiated on IV Lasix.  He subsequently diuresed well (a negative fluid balance >5L over 48hrs).  Subsequently transitioned off BiPAP back to supplemental oxygen, and remained stable.      Interval Hx:  No significant changes overnight.  Resting comfortably in bed.  On supplemental O2.     Objective/physical exam:  General: In no acute distress, afebrile  Chest: Clear to auscultation bilaterally  Heart: RRR, +S1, S2, no appreciable murmur  Abdomen: Soft, nontender, BS +  MSK: Warm, no lower extremity edema, no clubbing or cyanosis  Neurologic: Alert and oriented x4, Cranial nerve II-XII intact, Strength 5/5 in all 4 extremities he was new    VITAL SIGNS: 24 HRS MIN & MAX LAST   Temp  Min: 97.5 °F (36.4 °C)  Max: 98.4 °F (36.9 °C) 98.2 °F (36.8 °C)   BP  Min: 105/61  Max: 155/99 133/83   Pulse  Min: 61  Max: 110  66   Resp  Min: 16  Max: 22 18   SpO2  Min: 92 %  Max: 98 % 95 %       Recent Labs   Lab 03/30/24  0420 04/01/24  0359 04/05/24  0501   WBC 13.71* 11.96* 6.21   RBC 2.91* 3.00* 3.10*   HGB 8.9* 9.1* 9.4*   HCT 27.7* 28.7* 30.5*   MCV 95.2* 95.7* 98.4*   MCH 30.6 30.3 30.3   MCHC 32.1* 31.7* 30.8*   RDW 16.9 17.1* 16.8    295 200   MPV 10.3 10.5* 11.5*       Recent Labs   Lab 03/31/24  0410 04/01/24  0359 04/02/24  0442 04/04/24  0407 04/05/24  0501    141  --  138 141   K 3.4* 4.0  --  4.0 4.0   CO2 25 28  --  25 30   BUN 29.2* 32.1*  --  30.0* 30.9*   CREATININE 1.09 1.00  --  0.88 0.85   CALCIUM 8.9 8.8  --  8.4* 8.6*   MG 2.00 1.90 2.10  --   --           Microbiology Results (last 7 days)       ** No results found for the last 168 hours. **             Radiology:  X-Ray Chest 1 View  Narrative: EXAMINATION:  XR CHEST 1 VIEW    CLINICAL HISTORY:  resp. distress;    COMPARISON:  03/27/2024    FINDINGS:  Single view of the chest shows patchy bilateral alveolar opacities, increased from the prior study.  No pneumothorax.  Trace left pleural fluid is present.  Heart is  enlarged.  Aorta is partially calcified.  Impression: New patchy alveolar opacities in both lungs suggestive of atypical infection/edema    Findings are compatible with the preliminary report.    Electronically signed by: David Lipscomb MD  Date:    03/29/2024  Time:    09:47      Scheduled Med:   albuterol-ipratropium  3 mL Nebulization Q4H    aspirin  81 mg Oral Daily    atorvastatin  80 mg Oral Daily    chlorhexidine  15 mL Mouth/Throat BID    folic acid  1 mg Oral Daily    furosemide  40 mg Oral Daily    metoprolol succinate  12.5 mg Oral Daily    midodrine  10 mg Oral TID WM    multivitamin  1 tablet Oral Daily    pantoprazole  40 mg Intravenous BID    phenazopyridine  100 mg Oral TID WM    predniSONE  20 mg Oral Daily    rivaroxaban  20 mg Oral Daily with dinner        Continuous Infusions:       PRN Meds:  0.9%  NaCl infusion (for blood administration), 0.9%  NaCl infusion (for blood administration), acetaminophen, dextrose 10 % in water (D10W), dextrose 10 % in water (D10W), dextrose 10%, dextrose 10%, hydrALAZINE, insulin aspart U-100, ipratropium, LORazepam, melatonin, morphine, ondansetron, oxyCODONE, sodium chloride 0.9%, sodium chloride 0.9%       Assessment/Plan:   Scrotal abscess, with component of early Claudine's, s/p exploration, I&D, and left orchiectomy 03/18.  Severe sepsis from above, resolving  Questionable RLL post-viral Pneumonia, Human Metapneumovirus positive, resolving  Acute hypoxic respiratory failure from above  Cardiac arrest with ventricular tachycardia requiring defibrillation x3 (03/18/2024)  Acute on chronic diastolic heart failure  AFib RVR  NSTMI, unspecified  Partial bowel obstruction  Normocytic anemia.    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  He was completed all antibiotics.    He remains on steroids.  Now on  20 mg.  Continue to taper over the next week.  Can likely switch down to 10 mg tomorrow.  Continue Xarelto aspirin and statin.    Cardiology following.  Amiodarone discontinued and now on a beta-blocker.  Vital signs are stable.  His oxygen requirements did bump up last night. Now on 4L, was on 2 yesterday. Lungs sound alone course.  Checking a chest x-ray.  May need to bump up his Lasix.  He was not currently on any IV fluids but maybe retaining some fluid secondary to steroids.    Critical care diagnosis: acute hypoxemic respiratory failure requiring high-flow oxygen  Critical care interventions: hands on evaluation, review of labs/radiographs/records and discussions with family  Critical care time spent: >32 minutes        Tha Edmond MD   04/05/2024     All diagnosis and differential diagnosis have been reviewed; assessment and plan has been documented; I have personally reviewed the labs and test results that are presently available; I have reviewed the patients medication list; I have reviewed the consulting providers response and recommendations. I have reviewed or attempted to review medical records based upon their availability    All of the patient's questions have been  addressed and answered. Patient's is agreeable to the above stated plan. I will continue to monitor closely and make adjustments to medical management as needed.  _____________________________________________________________________

## 2024-04-06 LAB — TRIGL SERPL-MCNC: 42 MG/DL (ref 34–140)

## 2024-04-06 PROCEDURE — 63600175 PHARM REV CODE 636 W HCPCS: Performed by: INTERNAL MEDICINE

## 2024-04-06 PROCEDURE — 99900035 HC TECH TIME PER 15 MIN (STAT)

## 2024-04-06 PROCEDURE — 99900031 HC PATIENT EDUCATION (STAT)

## 2024-04-06 PROCEDURE — 25000003 PHARM REV CODE 250

## 2024-04-06 PROCEDURE — 94640 AIRWAY INHALATION TREATMENT: CPT

## 2024-04-06 PROCEDURE — 51702 INSERT TEMP BLADDER CATH: CPT

## 2024-04-06 PROCEDURE — 27000221 HC OXYGEN, UP TO 24 HOURS

## 2024-04-06 PROCEDURE — 25000003 PHARM REV CODE 250: Performed by: STUDENT IN AN ORGANIZED HEALTH CARE EDUCATION/TRAINING PROGRAM

## 2024-04-06 PROCEDURE — 63600175 PHARM REV CODE 636 W HCPCS: Performed by: HOSPITALIST

## 2024-04-06 PROCEDURE — 94760 N-INVAS EAR/PLS OXIMETRY 1: CPT

## 2024-04-06 PROCEDURE — 84478 ASSAY OF TRIGLYCERIDES: CPT

## 2024-04-06 PROCEDURE — C9113 INJ PANTOPRAZOLE SODIUM, VIA: HCPCS | Performed by: INTERNAL MEDICINE

## 2024-04-06 PROCEDURE — 21400001 HC TELEMETRY ROOM

## 2024-04-06 PROCEDURE — 63600175 PHARM REV CODE 636 W HCPCS

## 2024-04-06 PROCEDURE — 25000003 PHARM REV CODE 250: Performed by: INTERNAL MEDICINE

## 2024-04-06 PROCEDURE — 25000242 PHARM REV CODE 250 ALT 637 W/ HCPCS: Performed by: INTERNAL MEDICINE

## 2024-04-06 RX ORDER — FUROSEMIDE 10 MG/ML
20 INJECTION INTRAMUSCULAR; INTRAVENOUS ONCE
Status: COMPLETED | OUTPATIENT
Start: 2024-04-06 | End: 2024-04-06

## 2024-04-06 RX ORDER — FUROSEMIDE 10 MG/ML
20 INJECTION INTRAMUSCULAR; INTRAVENOUS ONCE
Status: DISCONTINUED | OUTPATIENT
Start: 2024-04-06 | End: 2024-04-06

## 2024-04-06 RX ADMIN — OXYCODONE HYDROCHLORIDE 5 MG: 5 TABLET ORAL at 10:04

## 2024-04-06 RX ADMIN — IPRATROPIUM BROMIDE AND ALBUTEROL SULFATE 3 ML: 2.5; .5 SOLUTION RESPIRATORY (INHALATION) at 04:04

## 2024-04-06 RX ADMIN — CHLORHEXIDINE GLUCONATE 0.12% ORAL RINSE 15 ML: 1.2 LIQUID ORAL at 08:04

## 2024-04-06 RX ADMIN — METOPROLOL SUCCINATE 12.5 MG: 25 TABLET, EXTENDED RELEASE ORAL at 08:04

## 2024-04-06 RX ADMIN — PANTOPRAZOLE SODIUM 40 MG: 40 INJECTION, POWDER, FOR SOLUTION INTRAVENOUS at 08:04

## 2024-04-06 RX ADMIN — THERA TABS 1 TABLET: TAB at 08:04

## 2024-04-06 RX ADMIN — MIDODRINE HYDROCHLORIDE 10 MG: 5 TABLET ORAL at 05:04

## 2024-04-06 RX ADMIN — PHENAZOPYRIDINE HYDROCHLORIDE 100 MG: 100 TABLET ORAL at 12:04

## 2024-04-06 RX ADMIN — FOLIC ACID 1 MG: 1 TABLET ORAL at 08:04

## 2024-04-06 RX ADMIN — OXYCODONE HYDROCHLORIDE 5 MG: 5 TABLET ORAL at 08:04

## 2024-04-06 RX ADMIN — RIVAROXABAN 20 MG: 10 TABLET, FILM COATED ORAL at 05:04

## 2024-04-06 RX ADMIN — FUROSEMIDE 40 MG: 40 TABLET ORAL at 08:04

## 2024-04-06 RX ADMIN — ASPIRIN 81 MG: 81 TABLET, COATED ORAL at 08:04

## 2024-04-06 RX ADMIN — IPRATROPIUM BROMIDE AND ALBUTEROL SULFATE 3 ML: 2.5; .5 SOLUTION RESPIRATORY (INHALATION) at 07:04

## 2024-04-06 RX ADMIN — IPRATROPIUM BROMIDE AND ALBUTEROL SULFATE 3 ML: 2.5; .5 SOLUTION RESPIRATORY (INHALATION) at 08:04

## 2024-04-06 RX ADMIN — IPRATROPIUM BROMIDE AND ALBUTEROL SULFATE 3 ML: 2.5; .5 SOLUTION RESPIRATORY (INHALATION) at 12:04

## 2024-04-06 RX ADMIN — MORPHINE SULFATE 2 MG: 4 INJECTION, SOLUTION INTRAMUSCULAR; INTRAVENOUS at 05:04

## 2024-04-06 RX ADMIN — PHENAZOPYRIDINE HYDROCHLORIDE 100 MG: 100 TABLET ORAL at 05:04

## 2024-04-06 RX ADMIN — ATORVASTATIN CALCIUM 80 MG: 40 TABLET, FILM COATED ORAL at 08:04

## 2024-04-06 RX ADMIN — PREDNISONE 20 MG: 20 TABLET ORAL at 08:04

## 2024-04-06 RX ADMIN — FUROSEMIDE 20 MG: 10 INJECTION, SOLUTION INTRAMUSCULAR; INTRAVENOUS at 12:04

## 2024-04-06 RX ADMIN — MIDODRINE HYDROCHLORIDE 10 MG: 5 TABLET ORAL at 12:04

## 2024-04-06 RX ADMIN — PHENAZOPYRIDINE HYDROCHLORIDE 100 MG: 100 TABLET ORAL at 08:04

## 2024-04-06 RX ADMIN — MIDODRINE HYDROCHLORIDE 10 MG: 5 TABLET ORAL at 08:04

## 2024-04-06 NOTE — PROGRESS NOTES
[facility] Hospital Medicine Progress Note        Chief Complaint: Inpatient Follow-up     HPI:   68-year-old male with a history that includes VHD, nonischemic cardiomyopathy (with an EF of 41%),  chronic AFib on Xarelto, peripheral arterial disease, COPD, and a large left testicle hydrocele, presented to the ED 3/17 complaining of what he thought was rectal bleeding as he was noting blood in the toilet and running down his legs.  Ultimately was found that the bleeding was coming from the posterior aspect of his scrotum from a superficial scrotal ulceration.  He also complained of persistent testicular swelling and pain.  Doppler ultrasound was significantly limited, but could not rule out the presence of torsion.  Urology was consulted and they made decision to proceed with emergent scrotal exploration.  While still in PACU, patient was hypotensive with blood persistently 80s/50s, this despite fluid resuscitation.   Patient subsequently transferred to the ICU for vasopressor support.  Additionally developed cardiac arrest with ventricular tachycardia requiring defibrillation x3 (03/18/2024), with ROSC.   He was continued on IV antibiotics, and eventually weaned off pressors.  Additionally FOB noted to be positive.  GI consulted and planned for EGD.  Cardiology involved as well, with plans for Ischemic evaluation.  Cleared for AC and started on FD lovenox.  Underwent EGD 3/22 noting grade B reflux esophagitis, chronic gastritis, chronic duodenitis.  GI considering outpatient colonoscopy, recommended Carafate for 7 days and continued PPI BID for a month on discharge and then daily for 3 months.  Cardiology on board, awaiting ischemic evaluation.  Urology continued to follow.  Patient continued on IV antibiotics; working with therapy.  On  evening of 3/28 he developed worsening hypoxemia.  CXR noted bilateral scattered opacities, and he was requiring BiPAP for oxygenation.  Antibiotics  broadened, and he was initiated on IV Lasix.  He subsequently diuresed well (a negative fluid balance >5L over 48hrs).  Subsequently transitioned off BiPAP back to supplemental oxygen, and remained stable.      Interval Hx:  No significant changes overnight.  Resting comfortably in bed.  On supplemental O2.     Objective/physical exam:  General: In no acute distress, afebrile  Chest: coarse bs b/l  Heart: RRR, +S1, S2, no appreciable murmur  Abdomen: Soft, nontender, BS +  MSK: Warm, no lower extremity edema, no clubbing or cyanosis  Neurologic: Alert and oriented x4, Cranial nerve II-XII intact, Strength 5/5 in all 4 extremities he was new    VITAL SIGNS: 24 HRS MIN & MAX LAST   Temp  Min: 97.6 °F (36.4 °C)  Max: 98.6 °F (37 °C) 97.6 °F (36.4 °C)   BP  Min: 111/72  Max: 146/89 116/75   Pulse  Min: 55  Max: 77  65   Resp  Min: 16  Max: 25 20   SpO2  Min: 87 %  Max: 97 % 95 %       Recent Labs   Lab 04/01/24  0359 04/05/24  0501   WBC 11.96* 6.21   RBC 3.00* 3.10*   HGB 9.1* 9.4*   HCT 28.7* 30.5*   MCV 95.7* 98.4*   MCH 30.3 30.3   MCHC 31.7* 30.8*   RDW 17.1* 16.8    200   MPV 10.5* 11.5*         Recent Labs   Lab 03/31/24  0410 04/01/24  0359 04/02/24  0442 04/04/24  0407 04/05/24  0501    141  --  138 141   K 3.4* 4.0  --  4.0 4.0   CO2 25 28  --  25 30   BUN 29.2* 32.1*  --  30.0* 30.9*   CREATININE 1.09 1.00  --  0.88 0.85   CALCIUM 8.9 8.8  --  8.4* 8.6*   MG 2.00 1.90 2.10  --   --             Microbiology Results (last 7 days)       ** No results found for the last 168 hours. **             Radiology:  X-Ray Chest 1 View  Narrative: EXAMINATION:  XR CHEST 1 VIEW    CPT 33439    CLINICAL HISTORY:  hypoxemia;    COMPARISON:  March 28, 2024    FINDINGS:  Examination reveals mediastinal silhouette to be within normal limits cardiac silhouette is mildly enlarged there is increase in interstitial and pulmonary vascular markings which may indicate some degree of pulmonary vascular congestion and  cardiac decompensation no focal consolidative changes are otherwise identified  Impression: Mild cardiomegaly.    Confluent airspace opacities that might suggest a degree of pulmonary vascular congestion and cardiac decompensation    Electronically signed by: Lito Hurst  Date:    04/05/2024  Time:    09:06      Scheduled Med:   albuterol-ipratropium  3 mL Nebulization Q4H    aspirin  81 mg Oral Daily    atorvastatin  80 mg Oral Daily    chlorhexidine  15 mL Mouth/Throat BID    folic acid  1 mg Oral Daily    furosemide  40 mg Oral Daily    metoprolol succinate  12.5 mg Oral Daily    midodrine  10 mg Oral TID WM    multivitamin  1 tablet Oral Daily    pantoprazole  40 mg Intravenous BID    phenazopyridine  100 mg Oral TID WM    predniSONE  20 mg Oral Daily    rivaroxaban  20 mg Oral Daily with dinner        Continuous Infusions:       PRN Meds:  0.9%  NaCl infusion (for blood administration), 0.9%  NaCl infusion (for blood administration), acetaminophen, dextrose 10 % in water (D10W), dextrose 10 % in water (D10W), dextrose 10%, dextrose 10%, hydrALAZINE, insulin aspart U-100, ipratropium, LORazepam, melatonin, morphine, ondansetron, oxyCODONE, sodium chloride 0.9%, sodium chloride 0.9%       Assessment/Plan:   Scrotal abscess, with component of early Claudine's, s/p exploration, I&D, and left orchiectomy 03/18.  Severe sepsis from above, resolving  Questionable RLL post-viral Pneumonia, Human Metapneumovirus positive, resolving  Acute hypoxic respiratory failure from above  Cardiac arrest with ventricular tachycardia requiring defibrillation x3 (03/18/2024)  Acute on chronic diastolic heart failure  AFib RVR  NSTMI, unspecified  Partial bowel obstruction  Normocytic anemia.    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 Completed all antibiotics. Afebrile   He  remains on steroids.  Now on 20 mg.  Continue to taper over the next week.  Can likely switch down to 10 mg tomorrow.  Continue Xarelto aspirin and statin.    Cardiology following.  Amiodarone discontinued and now on a beta-blocker.  Vital signs are stable.  Cxr- mild cardiomegaly, pulm vascular congestion. Will administer a dose of lasix 20 mg iv x 1 and monitor  and cont po lasix 40 mg qd     Critical care diagnosis: acute hypoxemic respiratory failure requiring high-flow oxygen  Critical care interventions: hands on evaluation, review of labs/radiographs/records and discussions with family  Critical care time spent: >32 minutes      Gaby Huerta MD   04/06/2024     All diagnosis and differential diagnosis have been reviewed; assessment and plan has been documented; I have personally reviewed the labs and test results that are presently available; I have reviewed the patients medication list; I have reviewed the consulting providers response and recommendations. I have reviewed or attempted to review medical records based upon their availability    All of the patient's questions have been  addressed and answered. Patient's is agreeable to the above stated plan. I will continue to monitor closely and make adjustments to medical management as needed.  _____________________________________________________________________

## 2024-04-07 LAB
ANION GAP SERPL CALC-SCNC: 8 MEQ/L
APPEARANCE UR: CLEAR
BACTERIA #/AREA URNS AUTO: ABNORMAL /HPF
BASOPHILS # BLD AUTO: 0 X10(3)/MCL
BASOPHILS NFR BLD AUTO: 0 %
BILIRUB UR QL STRIP.AUTO: ABNORMAL
BUN SERPL-MCNC: 39.4 MG/DL (ref 8.4–25.7)
CALCIUM SERPL-MCNC: 8.8 MG/DL (ref 8.8–10)
CHLORIDE SERPL-SCNC: 102 MMOL/L (ref 98–107)
CO2 SERPL-SCNC: 30 MMOL/L (ref 23–31)
COLOR UR AUTO: ABNORMAL
CREAT SERPL-MCNC: 0.85 MG/DL (ref 0.73–1.18)
CREAT/UREA NIT SERPL: 46
EOSINOPHIL # BLD AUTO: 0.17 X10(3)/MCL (ref 0–0.9)
EOSINOPHIL NFR BLD AUTO: 2.6 %
ERYTHROCYTE [DISTWIDTH] IN BLOOD BY AUTOMATED COUNT: 16.9 % (ref 11.5–17)
GFR SERPLBLD CREATININE-BSD FMLA CKD-EPI: >60 MLS/MIN/1.73/M2
GLUCOSE SERPL-MCNC: 104 MG/DL (ref 82–115)
GLUCOSE UR QL STRIP.AUTO: NORMAL
HCT VFR BLD AUTO: 29.5 % (ref 42–52)
HGB BLD-MCNC: 9.3 G/DL (ref 14–18)
IMM GRANULOCYTES # BLD AUTO: 0.03 X10(3)/MCL (ref 0–0.04)
IMM GRANULOCYTES NFR BLD AUTO: 0.5 %
KETONES UR QL STRIP.AUTO: NEGATIVE
LEUKOCYTE ESTERASE UR QL STRIP.AUTO: 250
LYMPHOCYTES # BLD AUTO: 0.96 X10(3)/MCL (ref 0.6–4.6)
LYMPHOCYTES NFR BLD AUTO: 14.5 %
MCH RBC QN AUTO: 30.7 PG (ref 27–31)
MCHC RBC AUTO-ENTMCNC: 31.5 G/DL (ref 33–36)
MCV RBC AUTO: 97.4 FL (ref 80–94)
MONOCYTES # BLD AUTO: 1.05 X10(3)/MCL (ref 0.1–1.3)
MONOCYTES NFR BLD AUTO: 15.8 %
NEUTROPHILS # BLD AUTO: 4.43 X10(3)/MCL (ref 2.1–9.2)
NEUTROPHILS NFR BLD AUTO: 66.6 %
NITRITE UR QL STRIP.AUTO: ABNORMAL
NRBC BLD AUTO-RTO: 0 %
PH UR STRIP.AUTO: 7.5 [PH]
PLATELET # BLD AUTO: 201 X10(3)/MCL (ref 130–400)
PMV BLD AUTO: 11 FL (ref 7.4–10.4)
POTASSIUM SERPL-SCNC: 4.4 MMOL/L (ref 3.5–5.1)
PROT UR QL STRIP.AUTO: ABNORMAL
RBC # BLD AUTO: 3.03 X10(6)/MCL (ref 4.7–6.1)
RBC #/AREA URNS AUTO: >100 /HPF
RBC UR QL AUTO: ABNORMAL
SODIUM SERPL-SCNC: 140 MMOL/L (ref 136–145)
SP GR UR STRIP.AUTO: 1.02 (ref 1–1.03)
SQUAMOUS #/AREA URNS LPF: ABNORMAL /HPF
TRIGL SERPL-MCNC: 40 MG/DL (ref 34–140)
UROBILINOGEN UR STRIP-ACNC: 4
WBC # SPEC AUTO: 6.64 X10(3)/MCL (ref 4.5–11.5)
WBC #/AREA URNS AUTO: ABNORMAL /HPF

## 2024-04-07 PROCEDURE — 63600175 PHARM REV CODE 636 W HCPCS: Performed by: HOSPITALIST

## 2024-04-07 PROCEDURE — 87086 URINE CULTURE/COLONY COUNT: CPT | Performed by: INTERNAL MEDICINE

## 2024-04-07 PROCEDURE — 25000003 PHARM REV CODE 250: Performed by: INTERNAL MEDICINE

## 2024-04-07 PROCEDURE — 99900035 HC TECH TIME PER 15 MIN (STAT)

## 2024-04-07 PROCEDURE — 25000242 PHARM REV CODE 250 ALT 637 W/ HCPCS: Performed by: INTERNAL MEDICINE

## 2024-04-07 PROCEDURE — 25000003 PHARM REV CODE 250

## 2024-04-07 PROCEDURE — 99900031 HC PATIENT EDUCATION (STAT)

## 2024-04-07 PROCEDURE — 63600175 PHARM REV CODE 636 W HCPCS: Performed by: INTERNAL MEDICINE

## 2024-04-07 PROCEDURE — 21400001 HC TELEMETRY ROOM

## 2024-04-07 PROCEDURE — C9113 INJ PANTOPRAZOLE SODIUM, VIA: HCPCS | Performed by: INTERNAL MEDICINE

## 2024-04-07 PROCEDURE — 25000003 PHARM REV CODE 250: Performed by: STUDENT IN AN ORGANIZED HEALTH CARE EDUCATION/TRAINING PROGRAM

## 2024-04-07 PROCEDURE — 85025 COMPLETE CBC W/AUTO DIFF WBC: CPT | Performed by: INTERNAL MEDICINE

## 2024-04-07 PROCEDURE — 81001 URINALYSIS AUTO W/SCOPE: CPT | Performed by: INTERNAL MEDICINE

## 2024-04-07 PROCEDURE — 94799 UNLISTED PULMONARY SVC/PX: CPT | Mod: XB

## 2024-04-07 PROCEDURE — 94760 N-INVAS EAR/PLS OXIMETRY 1: CPT

## 2024-04-07 PROCEDURE — 80048 BASIC METABOLIC PNL TOTAL CA: CPT | Performed by: INTERNAL MEDICINE

## 2024-04-07 PROCEDURE — 84478 ASSAY OF TRIGLYCERIDES: CPT

## 2024-04-07 PROCEDURE — 27000221 HC OXYGEN, UP TO 24 HOURS

## 2024-04-07 PROCEDURE — 94640 AIRWAY INHALATION TREATMENT: CPT

## 2024-04-07 PROCEDURE — 63600175 PHARM REV CODE 636 W HCPCS

## 2024-04-07 PROCEDURE — 51702 INSERT TEMP BLADDER CATH: CPT

## 2024-04-07 RX ADMIN — MIDODRINE HYDROCHLORIDE 10 MG: 5 TABLET ORAL at 08:04

## 2024-04-07 RX ADMIN — IPRATROPIUM BROMIDE AND ALBUTEROL SULFATE 3 ML: 2.5; .5 SOLUTION RESPIRATORY (INHALATION) at 12:04

## 2024-04-07 RX ADMIN — PANTOPRAZOLE SODIUM 40 MG: 40 INJECTION, POWDER, FOR SOLUTION INTRAVENOUS at 08:04

## 2024-04-07 RX ADMIN — IPRATROPIUM BROMIDE AND ALBUTEROL SULFATE 3 ML: 2.5; .5 SOLUTION RESPIRATORY (INHALATION) at 04:04

## 2024-04-07 RX ADMIN — CHLORHEXIDINE GLUCONATE 0.12% ORAL RINSE 15 ML: 1.2 LIQUID ORAL at 11:04

## 2024-04-07 RX ADMIN — MORPHINE SULFATE 2 MG: 4 INJECTION, SOLUTION INTRAMUSCULAR; INTRAVENOUS at 02:04

## 2024-04-07 RX ADMIN — IPRATROPIUM BROMIDE AND ALBUTEROL SULFATE 3 ML: 2.5; .5 SOLUTION RESPIRATORY (INHALATION) at 11:04

## 2024-04-07 RX ADMIN — PHENAZOPYRIDINE HYDROCHLORIDE 100 MG: 100 TABLET ORAL at 12:04

## 2024-04-07 RX ADMIN — ATORVASTATIN CALCIUM 80 MG: 40 TABLET, FILM COATED ORAL at 08:04

## 2024-04-07 RX ADMIN — PREDNISONE 20 MG: 20 TABLET ORAL at 08:04

## 2024-04-07 RX ADMIN — OXYCODONE HYDROCHLORIDE 5 MG: 5 TABLET ORAL at 09:04

## 2024-04-07 RX ADMIN — MIDODRINE HYDROCHLORIDE 10 MG: 5 TABLET ORAL at 04:04

## 2024-04-07 RX ADMIN — PHENAZOPYRIDINE HYDROCHLORIDE 100 MG: 100 TABLET ORAL at 04:04

## 2024-04-07 RX ADMIN — IPRATROPIUM BROMIDE AND ALBUTEROL SULFATE 3 ML: 2.5; .5 SOLUTION RESPIRATORY (INHALATION) at 03:04

## 2024-04-07 RX ADMIN — CHLORHEXIDINE GLUCONATE 0.12% ORAL RINSE 15 ML: 1.2 LIQUID ORAL at 08:04

## 2024-04-07 RX ADMIN — FOLIC ACID 1 MG: 1 TABLET ORAL at 08:04

## 2024-04-07 RX ADMIN — FUROSEMIDE 40 MG: 40 TABLET ORAL at 08:04

## 2024-04-07 RX ADMIN — MIDODRINE HYDROCHLORIDE 10 MG: 5 TABLET ORAL at 12:04

## 2024-04-07 RX ADMIN — IPRATROPIUM BROMIDE AND ALBUTEROL SULFATE 3 ML: 2.5; .5 SOLUTION RESPIRATORY (INHALATION) at 08:04

## 2024-04-07 RX ADMIN — THERA TABS 1 TABLET: TAB at 08:04

## 2024-04-07 RX ADMIN — PHENAZOPYRIDINE HYDROCHLORIDE 100 MG: 100 TABLET ORAL at 08:04

## 2024-04-07 RX ADMIN — IPRATROPIUM BROMIDE AND ALBUTEROL SULFATE 3 ML: 2.5; .5 SOLUTION RESPIRATORY (INHALATION) at 07:04

## 2024-04-07 RX ADMIN — METOPROLOL SUCCINATE 12.5 MG: 25 TABLET, EXTENDED RELEASE ORAL at 08:04

## 2024-04-07 RX ADMIN — PANTOPRAZOLE SODIUM 40 MG: 40 INJECTION, POWDER, FOR SOLUTION INTRAVENOUS at 11:04

## 2024-04-07 RX ADMIN — ASPIRIN 81 MG: 81 TABLET, COATED ORAL at 08:04

## 2024-04-07 NOTE — PROGRESS NOTES
[facility] Hospital Medicine Progress Note        Chief Complaint: Inpatient Follow-up     HPI:   68-year-old male with a history that includes VHD, nonischemic cardiomyopathy (with an EF of 41%),  chronic AFib on Xarelto, peripheral arterial disease, COPD, and a large left testicle hydrocele, presented to the ED 3/17 complaining of what he thought was rectal bleeding as he was noting blood in the toilet and running down his legs.  Ultimately was found that the bleeding was coming from the posterior aspect of his scrotum from a superficial scrotal ulceration.  He also complained of persistent testicular swelling and pain.  Doppler ultrasound was significantly limited, but could not rule out the presence of torsion.  Urology was consulted and they made decision to proceed with emergent scrotal exploration.  While still in PACU, patient was hypotensive with blood persistently 80s/50s, this despite fluid resuscitation.   Patient subsequently transferred to the ICU for vasopressor support.  Additionally developed cardiac arrest with ventricular tachycardia requiring defibrillation x3 (03/18/2024), with ROSC.   He was continued on IV antibiotics, and eventually weaned off pressors.  Additionally FOB noted to be positive.  GI consulted and planned for EGD.  Cardiology involved as well, with plans for Ischemic evaluation.  Cleared for AC and started on FD lovenox.  Underwent EGD 3/22 noting grade B reflux esophagitis, chronic gastritis, chronic duodenitis.  GI considering outpatient colonoscopy, recommended Carafate for 7 days and continued PPI BID for a month on discharge and then daily for 3 months.  Cardiology on board, awaiting ischemic evaluation.  Urology continued to follow.  Patient continued on IV antibiotics; working with therapy.  On  evening of 3/28 he developed worsening hypoxemia.  CXR noted bilateral scattered opacities, and he was requiring BiPAP for oxygenation.  Antibiotics  broadened, and he was initiated on IV Lasix.  He subsequently diuresed well (a negative fluid balance >5L over 48hrs).  Subsequently transitioned off BiPAP back to supplemental oxygen, and remained stable.      Interval Hx:  No significant changes overnight.  Resting comfortably in bed.  On supplemental O2 @ 3LPM .will consult  for home o2      Objective/physical exam:  General: In no acute distress, afebrile  Chest: cta b/l   Heart: RRR, +S1, S2, no appreciable murmur  Abdomen: Soft, nontender, BS +  MSK: Warm, no lower extremity edema, no clubbing or cyanosis  Neurologic: Alert and oriented x4, Cranial nerve II-XII intact, Strength 5/5 in all 4 extremities he was new    VITAL SIGNS: 24 HRS MIN & MAX LAST   Temp  Min: 97.9 °F (36.6 °C)  Max: 98.4 °F (36.9 °C) 97.9 °F (36.6 °C)   BP  Min: 111/71  Max: 148/82 124/80   Pulse  Min: 52  Max: 91  61   Resp  Min: 16  Max: 20 18   SpO2  Min: 94 %  Max: 97 % 97 %       Recent Labs   Lab 04/01/24  0359 04/05/24  0501 04/07/24  0324   WBC 11.96* 6.21 6.64   RBC 3.00* 3.10* 3.03*   HGB 9.1* 9.4* 9.3*   HCT 28.7* 30.5* 29.5*   MCV 95.7* 98.4* 97.4*   MCH 30.3 30.3 30.7   MCHC 31.7* 30.8* 31.5*   RDW 17.1* 16.8 16.9    200 201   MPV 10.5* 11.5* 11.0*         Recent Labs   Lab 04/01/24  0359 04/02/24  0442 04/04/24  0407 04/05/24  0501 04/07/24  0324     --  138 141 140   K 4.0  --  4.0 4.0 4.4   CO2 28  --  25 30 30   BUN 32.1*  --  30.0* 30.9* 39.4*   CREATININE 1.00  --  0.88 0.85 0.85   CALCIUM 8.8  --  8.4* 8.6* 8.8   MG 1.90 2.10  --   --   --             Microbiology Results (last 7 days)       ** No results found for the last 168 hours. **             Radiology:  X-Ray Chest 1 View  Narrative: EXAMINATION:  XR CHEST 1 VIEW    CPT 92443    CLINICAL HISTORY:  hypoxemia;    COMPARISON:  March 28, 2024    FINDINGS:  Examination reveals mediastinal silhouette to be within normal limits cardiac silhouette is mildly enlarged there is increase in interstitial and  pulmonary vascular markings which may indicate some degree of pulmonary vascular congestion and cardiac decompensation no focal consolidative changes are otherwise identified  Impression: Mild cardiomegaly.    Confluent airspace opacities that might suggest a degree of pulmonary vascular congestion and cardiac decompensation    Electronically signed by: Lito Hurst  Date:    04/05/2024  Time:    09:06      Scheduled Med:   albuterol-ipratropium  3 mL Nebulization Q4H    aspirin  81 mg Oral Daily    atorvastatin  80 mg Oral Daily    chlorhexidine  15 mL Mouth/Throat BID    folic acid  1 mg Oral Daily    furosemide  40 mg Oral Daily    metoprolol succinate  12.5 mg Oral Daily    midodrine  10 mg Oral TID WM    multivitamin  1 tablet Oral Daily    pantoprazole  40 mg Intravenous BID    phenazopyridine  100 mg Oral TID WM    predniSONE  20 mg Oral Daily    rivaroxaban  20 mg Oral Daily with dinner        Continuous Infusions:       PRN Meds:  0.9%  NaCl infusion (for blood administration), 0.9%  NaCl infusion (for blood administration), acetaminophen, dextrose 10 % in water (D10W), dextrose 10 % in water (D10W), dextrose 10%, dextrose 10%, hydrALAZINE, insulin aspart U-100, ipratropium, LORazepam, melatonin, morphine, ondansetron, oxyCODONE, sodium chloride 0.9%, sodium chloride 0.9%       Assessment/Plan:   Scrotal abscess, with component of early Claudine's, s/p exploration, I&D, and left orchiectomy 03/18.  Severe sepsis from above, resolving  Questionable RLL post-viral Pneumonia, Human Metapneumovirus positive, resolving  Acute hypoxic respiratory failure from above  Cardiac arrest with ventricular tachycardia requiring defibrillation x3 (03/18/2024)  Acute on chronic diastolic heart failure  AFib RVR  NSTMI, unspecified  Partial bowel obstruction  Normocytic anemia.    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 Completed all antibiotics. Afebrile   He remains on steroids.  Now on 20 mg.  Continue to taper over the next week.  Can likely switch down to 10 mg tomorrow.  Continue Xarelto aspirin and statin.    Cardiology following.  Amiodarone discontinued and now on a beta-blocker.  Vital signs are stable.  Cxr- mild cardiomegaly, pulm vascular congestion. cont po lasix 40 mg qd   Repeat cxr ordered in the am   CM consulted for home o2   Possible dc home w HH tomorrow once home o2 approved and pt stable         Gaby Huerta MD   04/07/2024     All diagnosis and differential diagnosis have been reviewed; assessment and plan has been documented; I have personally reviewed the labs and test results that are presently available; I have reviewed the patients medication list; I have reviewed the consulting providers response and recommendations. I have reviewed or attempted to review medical records based upon their availability    All of the patient's questions have been  addressed and answered. Patient's is agreeable to the above stated plan. I will continue to monitor closely and make adjustments to medical management as needed.  _____________________________________________________________________

## 2024-04-08 LAB — TRIGL SERPL-MCNC: 48 MG/DL (ref 34–140)

## 2024-04-08 PROCEDURE — 27000221 HC OXYGEN, UP TO 24 HOURS

## 2024-04-08 PROCEDURE — 84478 ASSAY OF TRIGLYCERIDES: CPT

## 2024-04-08 PROCEDURE — 27100171 HC OXYGEN HIGH FLOW UP TO 24 HOURS

## 2024-04-08 PROCEDURE — 99900035 HC TECH TIME PER 15 MIN (STAT)

## 2024-04-08 PROCEDURE — 25000003 PHARM REV CODE 250: Performed by: INTERNAL MEDICINE

## 2024-04-08 PROCEDURE — 21400001 HC TELEMETRY ROOM

## 2024-04-08 PROCEDURE — 25000003 PHARM REV CODE 250: Performed by: STUDENT IN AN ORGANIZED HEALTH CARE EDUCATION/TRAINING PROGRAM

## 2024-04-08 PROCEDURE — 25000003 PHARM REV CODE 250

## 2024-04-08 PROCEDURE — 94760 N-INVAS EAR/PLS OXIMETRY 1: CPT

## 2024-04-08 PROCEDURE — C9113 INJ PANTOPRAZOLE SODIUM, VIA: HCPCS | Performed by: INTERNAL MEDICINE

## 2024-04-08 PROCEDURE — 99900031 HC PATIENT EDUCATION (STAT)

## 2024-04-08 PROCEDURE — 94761 N-INVAS EAR/PLS OXIMETRY MLT: CPT

## 2024-04-08 PROCEDURE — 63600175 PHARM REV CODE 636 W HCPCS: Performed by: INTERNAL MEDICINE

## 2024-04-08 PROCEDURE — 94640 AIRWAY INHALATION TREATMENT: CPT

## 2024-04-08 PROCEDURE — 63600175 PHARM REV CODE 636 W HCPCS

## 2024-04-08 PROCEDURE — 63600175 PHARM REV CODE 636 W HCPCS: Performed by: HOSPITALIST

## 2024-04-08 PROCEDURE — 25000242 PHARM REV CODE 250 ALT 637 W/ HCPCS: Performed by: INTERNAL MEDICINE

## 2024-04-08 RX ORDER — PREDNISONE 10 MG/1
10 TABLET ORAL DAILY
Status: DISCONTINUED | OUTPATIENT
Start: 2024-04-09 | End: 2024-04-12 | Stop reason: HOSPADM

## 2024-04-08 RX ADMIN — MIDODRINE HYDROCHLORIDE 10 MG: 5 TABLET ORAL at 12:04

## 2024-04-08 RX ADMIN — MORPHINE SULFATE 2 MG: 4 INJECTION, SOLUTION INTRAMUSCULAR; INTRAVENOUS at 03:04

## 2024-04-08 RX ADMIN — ASPIRIN 81 MG: 81 TABLET, COATED ORAL at 09:04

## 2024-04-08 RX ADMIN — MIDODRINE HYDROCHLORIDE 10 MG: 5 TABLET ORAL at 09:04

## 2024-04-08 RX ADMIN — FOLIC ACID 1 MG: 1 TABLET ORAL at 09:04

## 2024-04-08 RX ADMIN — PANTOPRAZOLE SODIUM 40 MG: 40 INJECTION, POWDER, FOR SOLUTION INTRAVENOUS at 09:04

## 2024-04-08 RX ADMIN — Medication 6 MG: at 09:04

## 2024-04-08 RX ADMIN — IPRATROPIUM BROMIDE AND ALBUTEROL SULFATE 3 ML: 2.5; .5 SOLUTION RESPIRATORY (INHALATION) at 08:04

## 2024-04-08 RX ADMIN — MIDODRINE HYDROCHLORIDE 10 MG: 5 TABLET ORAL at 05:04

## 2024-04-08 RX ADMIN — PHENAZOPYRIDINE HYDROCHLORIDE 100 MG: 100 TABLET ORAL at 09:04

## 2024-04-08 RX ADMIN — ATORVASTATIN CALCIUM 80 MG: 40 TABLET, FILM COATED ORAL at 09:04

## 2024-04-08 RX ADMIN — THERA TABS 1 TABLET: TAB at 09:04

## 2024-04-08 RX ADMIN — METOPROLOL SUCCINATE 12.5 MG: 25 TABLET, EXTENDED RELEASE ORAL at 09:04

## 2024-04-08 RX ADMIN — IPRATROPIUM BROMIDE AND ALBUTEROL SULFATE 3 ML: 2.5; .5 SOLUTION RESPIRATORY (INHALATION) at 12:04

## 2024-04-08 RX ADMIN — CHLORHEXIDINE GLUCONATE 0.12% ORAL RINSE 15 ML: 1.2 LIQUID ORAL at 09:04

## 2024-04-08 RX ADMIN — PREDNISONE 20 MG: 20 TABLET ORAL at 09:04

## 2024-04-08 RX ADMIN — OXYCODONE HYDROCHLORIDE 5 MG: 5 TABLET ORAL at 04:04

## 2024-04-08 RX ADMIN — PHENAZOPYRIDINE HYDROCHLORIDE 100 MG: 100 TABLET ORAL at 12:04

## 2024-04-08 RX ADMIN — PHENAZOPYRIDINE HYDROCHLORIDE 100 MG: 100 TABLET ORAL at 05:04

## 2024-04-08 RX ADMIN — FUROSEMIDE 40 MG: 40 TABLET ORAL at 09:04

## 2024-04-08 RX ADMIN — RIVAROXABAN 20 MG: 10 TABLET, FILM COATED ORAL at 05:04

## 2024-04-08 RX ADMIN — IPRATROPIUM BROMIDE AND ALBUTEROL SULFATE 3 ML: 2.5; .5 SOLUTION RESPIRATORY (INHALATION) at 09:04

## 2024-04-08 NOTE — PHYSICIAN QUERY
PT Name: Ran Reyes Jr.  MR #: [id number]     DOCUMENTATION CLARIFICATION   Mere Montoya RN, CCDS   Documentation Excellence  [email or web address]   This form is a permanent document in the medical record.    Query Date: April 8, 2024    By submitting this query, we are merely seeking further clarification of documentation.   Please utilize your independent clinical judgment when addressing the question(s) below.    The Medical Record reflects the following:     Indicators   Supporting Clinical Findings Location in Medical Record   x Lab Value(s) Potassium  3/18 3.3  3/19 3.0; 3.4  3/21 3.4  3/22 3.2  3/31 3.4   lab   x Treatment/Medication monitor and replace electrolytes  Electrolyte derangements        potassium chloride 20 mEq  IVPB  x3    3/19  potassium phosphate 15 mmol  IVBPB  3/19 - 1  occurrence  potassium bicarbonate disintegrating tablet 50 mEq  3/19 - 1  occurrence  potassium phosphate 30 mmol  IVPB   3//19  1 occurrence  potassium phosphate 30 mmol IVPB    3/21  potassium chloride 20 mEq in 100 mL IVPB  3/22 - 2  occurrences  potassium chloride SA CR tablet 40 mEq      3/3 - 1  occurrence  potassium chloride SA CR tablet 20 mEq      3/31 - 1  occurrence 3/20 Pulm PN    3/23 Hosp PN          mar    Other       Provider, please specify the diagnosis or diagnoses that correspond(s) to the above indicators.     [   x] Hypokalemia     [   ] Other electrolyte disturbance (please specify): __________     [   ]  Clinically Undetermined       Please document in your progress notes daily for the duration of treatment until resolved, and include in your discharge summary.    Form No. [id number]

## 2024-04-08 NOTE — PHYSICIAN QUERY
PT Name: Ran Reyes Jr.  MR #: [id number]    DOCUMENTATION CLARIFICATION   Mere Montoya RN, CCDS   Documentation Excellence  [email or web address]   This form is a permanent document in the medical record.     Query Date: April 8, 2024    By submitting this query, we are merely seeking further clarification of documentation.   Please utilize your independent clinical judgment when addressing the question(s) below.    Supporting Clinical Findings Location in Medical Record   +scrotal swelling (+bleeding)  Ulceration to back of scrotum that is severely enlarged and leaking blood.          No crepitus around scrotum or perineum. No blood near rectum.          Rectal exam: brown stool, no blood.      AFib with RVR anemia not requiring transfusion but a 4 point drop in hemoglobin     Borderline hypotension with the elevated BNP, scrotal hematoma possibly sirs        criteria/sepsis.     Ed md   Chronic atrial fibrillation on Xarelto      CT abdomen and pelvis showed a large amount of hyperdense hemorrhagic fluid within the scrotum without active bleeding and recommended Doppler ultrasound.      Doppler ultrasound was significantly limited, but could not rule out the presence of torsion.     GI bleeding was excluded with a brown stool that was occult negative on exam in the ER    Scrotal ulceration / bleeding / hematoma    -hold antiplatelets/anticoagulants   H&P    He reports 2 or 3 months ago he began with scrotal swelling mostly on the left.  For the last 2 weeks, his swelling has been significantly worse and he has had constant 10/10 pain.  He began with drainage from his scrotum yesterday.  He reports some dysuria; denies hematuria, fever or chills.  He denies any difficulty emptying his bladder.  He denies any prior urologic surgeries.       He is on Xarelto at home.  -hydrocele/scrotal hematoma with superficial ulceration and drainage   Xarelto currently on hold   3/18 Urology CN    Left scrotal abscess.    "  PROCEDURE:  Left hemiscrotal exploration; left orchiectomy; debridement of   wound; washout with Pulsavac; wound packing.     PREOPERATIVE SITUATION:  This is a seriously ill 68-year-old gentleman.  He has   a left hemiscrotal abscess with a markedly swollen left hemiscrotum with   induration, erythema/early Claudine gangrene.     3/19 OP Note   2 u PRBC 3/24 & 3/25 Blood Bank   stool + for OB       No overt GIB  Baseline HGB around 14.   Acute macrocytic anemia  - Monitor H/H and transfuse as needed to Hgb 7  - Monitor stools for bleeding   3/22 GI CN     Anemia - Stable    - Cleared by GI for DAPT/AC    - Requiring Transfusion    - EGD (3.22.24) - LA Grade B Reflux Esophagitis with No Bleeding, Chronic Gastritis, Protonix; Colonoscopy as OP 2/2 Scrotal Wound   3/22 Cards     03/17/24 21:03 03/18/24 05:33 03/18/24 22:26 03/19/24 00:21 03/19/24 05:37 03/20/24 02:06 03/21/24 02:20 03/22/24 01:20 03/23/24 01:22 03/24/24 01:53 03/24/24 13:57   Hgb 10.3 (L) 10.5 (L) 8.2 (L) 8.9 (L) 8.7 (L) 8.5 (L) 8.6 (L) 8.4 (L) 8.5 (L) 7.3 (L) 8.0 (L)   Hct 31.8 (L) 32.4 (L) 25.1 (L) 26.9 (L) 27.5 (L) 25.7 (L) 27.2 (L) 26.3 (L) 26.2 (L) 23.3 (L) 25.1 (L)       03/25/24 02:44 03/26/24 04:49 03/27/24 04:22 03/28/24 03:52 03/29/24 00:40 03/30/24 04:20 04/01/24 03:59 04/05/24 05:01 04/07/24 03:24   Hgb 8.7 (L) 8.5 (L) 8.5 (L) 8.4 (L) 9.2 (L) 8.9 (L) 9.1 (L) 9.4 (L) 9.3 (L)   Hct 26.2 (L) 26.8 (L) 26.6 (L) 26.9 (L) 29.5 (L) 27.7 (L) 28.7 (L) 30.5 (L) 29.5 (L)       03/17/24 21:03   PT 19.1 (H)   INR 1.6 (H)   PTT 32.4     lab     Provider, please clarify if there is any clinical correlation between "hematoma", "scrotal bleeding"  &  "Xarelto"           Are the conditions:    [ x ] Due to or associated with each other     [  ] Unrelated to each other     [  ] Other explanation (Please Specify): ____________     [  ]   Clinically Undetermined                                                                             Please document in " your progress notes daily for the duration of treatment until resolved and include in your discharge summary.

## 2024-04-08 NOTE — PROGRESS NOTES
[facility] Hospital Medicine Progress Note        Chief Complaint: Inpatient Follow-up     HPI:   68-year-old male with a history that includes VHD, nonischemic cardiomyopathy (with an EF of 41%),  chronic AFib on Xarelto, peripheral arterial disease, COPD, and a large left testicle hydrocele, presented to the ED 3/17 complaining of what he thought was rectal bleeding as he was noting blood in the toilet and running down his legs.  Ultimately was found that the bleeding was coming from the posterior aspect of his scrotum from a superficial scrotal ulceration.  He also complained of persistent testicular swelling and pain.  Doppler ultrasound was significantly limited, but could not rule out the presence of torsion.  Urology was consulted and they made decision to proceed with emergent scrotal exploration.  While still in PACU, patient was hypotensive with blood persistently 80s/50s, this despite fluid resuscitation.   Patient subsequently transferred to the ICU for vasopressor support.  Additionally developed cardiac arrest with ventricular tachycardia requiring defibrillation x3 (03/18/2024), with ROSC.   He was continued on IV antibiotics, and eventually weaned off pressors.  Additionally FOB noted to be positive.  GI consulted and planned for EGD.  Cardiology involved as well, with plans for Ischemic evaluation.  Cleared for AC and started on FD lovenox.  Underwent EGD 3/22 noting grade B reflux esophagitis, chronic gastritis, chronic duodenitis.  GI considering outpatient colonoscopy, recommended Carafate for 7 days and continued PPI BID for a month on discharge and then daily for 3 months.  Cardiology on board, awaiting ischemic evaluation.  Urology continued to follow.  Patient continued on IV antibiotics; working with therapy.  On  evening of 3/28 he developed worsening hypoxemia.  CXR noted bilateral scattered opacities, and he was requiring BiPAP for oxygenation.  Antibiotics  broadened, and he was initiated on IV Lasix.  He subsequently diuresed well (a negative fluid balance >5L over 48hrs).  Subsequently transitioned off BiPAP back to supplemental oxygen, and remained stable.      Interval Hx:  No significant changes.  Doing well.  Asking when he can go home.  Discussed that we are waiting on his home health and home O2 to be set up.  Case management she would be back today.  Otherwise he was remained stable.  Currently without any complaints     Objective/physical exam:  General: In no acute distress, afebrile  Chest: cta b/l   Heart: RRR, +S1, S2, no appreciable murmur  Abdomen: Soft, nontender, BS +  MSK: Warm, no lower extremity edema, no clubbing or cyanosis  Neurologic: Alert and oriented x4, Cranial nerve II-XII intact, Strength 5/5 in all 4 extremities he was new    VITAL SIGNS: 24 HRS MIN & MAX LAST   Temp  Min: 97.6 °F (36.4 °C)  Max: 98.2 °F (36.8 °C) 98.1 °F (36.7 °C)   BP  Min: 110/70  Max: 127/82 127/82   Pulse  Min: 57  Max: 89  66   Resp  Min: 16  Max: 20 20   SpO2  Min: 90 %  Max: 98 % (!) 90 %       Recent Labs   Lab 04/05/24  0501 04/07/24  0324   WBC 6.21 6.64   RBC 3.10* 3.03*   HGB 9.4* 9.3*   HCT 30.5* 29.5*   MCV 98.4* 97.4*   MCH 30.3 30.7   MCHC 30.8* 31.5*   RDW 16.8 16.9    201   MPV 11.5* 11.0*       Recent Labs   Lab 04/02/24  0442 04/04/24  0407 04/05/24  0501 04/07/24  0324   NA  --  138 141 140   K  --  4.0 4.0 4.4   CO2  --  25 30 30   BUN  --  30.0* 30.9* 39.4*   CREATININE  --  0.88 0.85 0.85   CALCIUM  --  8.4* 8.6* 8.8   MG 2.10  --   --   --           Microbiology Results (last 7 days)       Procedure Component Value Units Date/Time    Urine culture [[id number]] Collected: 04/07/24 2023    Order Status: Completed Specimen: Urine Updated: 04/08/24 0633     Urine Culture No Growth At 24 Hours             Radiology:  X-Ray Chest 1 View  Narrative: EXAMINATION:  XR CHEST 1 VIEW    CPT 96091    CLINICAL HISTORY:  hypoxemia;    COMPARISON:  March  28, 2024    FINDINGS:  Examination reveals mediastinal silhouette to be within normal limits cardiac silhouette is mildly enlarged there is increase in interstitial and pulmonary vascular markings which may indicate some degree of pulmonary vascular congestion and cardiac decompensation no focal consolidative changes are otherwise identified  Impression: Mild cardiomegaly.    Confluent airspace opacities that might suggest a degree of pulmonary vascular congestion and cardiac decompensation    Electronically signed by: Lito Hurst  Date:    04/05/2024  Time:    09:06      Scheduled Med:   albuterol-ipratropium  3 mL Nebulization Q4H    aspirin  81 mg Oral Daily    atorvastatin  80 mg Oral Daily    chlorhexidine  15 mL Mouth/Throat BID    folic acid  1 mg Oral Daily    furosemide  40 mg Oral Daily    metoprolol succinate  12.5 mg Oral Daily    midodrine  10 mg Oral TID WM    multivitamin  1 tablet Oral Daily    pantoprazole  40 mg Intravenous BID    phenazopyridine  100 mg Oral TID WM    predniSONE  20 mg Oral Daily    rivaroxaban  20 mg Oral Daily with dinner        Continuous Infusions:       PRN Meds:  0.9%  NaCl infusion (for blood administration), 0.9%  NaCl infusion (for blood administration), acetaminophen, dextrose 10 % in water (D10W), dextrose 10 % in water (D10W), dextrose 10%, dextrose 10%, hydrALAZINE, insulin aspart U-100, ipratropium, LORazepam, melatonin, morphine, ondansetron, oxyCODONE, sodium chloride 0.9%, sodium chloride 0.9%       Assessment/Plan:   Scrotal abscess, with component of early Claudine's, s/p exploration, I&D, and left orchiectomy 03/18.  Severe sepsis from above, resolving  Questionable RLL post-viral Pneumonia, Human Metapneumovirus positive, resolving  Acute hypoxic respiratory failure from above  Cardiac arrest with ventricular tachycardia requiring defibrillation x3 (03/18/2024)  Acute on chronic diastolic heart failure  AFib RVR  NSTMI, unspecified  Partial bowel  obstruction  Normocytic anemia.    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  Mild ELIZABETH, resolved    Patient was prolonged pulmonary recovery from pneumonia and previous cardiac arrest.  Requiring supplemental O2.  Saturations drop with exertion.  Case Management has been consulted to arrange for home O2.  He was completed all antibiotics.  He was on a prolonged steroid taper.  Agree with decreasing to 10 mg today.  Will come continue for 1 more week and then possibly discontinue.  Chest x-ray was repeated this morning.  Will follow up results.  Previous from 04/05 with some mild pulmonary edema.  May need to increase his Lasix to b.i.d..  Will follow up this morning's results.  Continue with Xarelto, aspirin, and statin.    Will need close follow up with Cardiology as an outpatient.      Tha Edmond MD   04/08/2024     All diagnosis and differential diagnosis have been reviewed; assessment and plan has been documented; I have personally reviewed the labs and test results that are presently available; I have reviewed the patients medication list; I have reviewed the consulting providers response and recommendations. I have reviewed or attempted to review medical records based upon their availability    All of the patient's questions have been  addressed and answered. Patient's is agreeable to the above stated plan. I will continue to monitor closely and make adjustments to medical management as needed.  _____________________________________________________________________

## 2024-04-08 NOTE — PLAN OF CARE
Spoke to patient about discharge planning. Patient is current with [facility]. Patient currently has wound care bid but is unsure if he will have someone at home to assist with wound care Patient maybe able to perform wound care with teaching. I did discuss with nurse to assess. We also discussed having wound care reval wound to see of frequency can be decreased. Spoke to patient about oxygen. Referral sent to [facility] via [facility].

## 2024-04-08 NOTE — PLAN OF CARE
04/08/24 0828   Medicare Message   Important Message from Medicare regarding Discharge Appeal Rights Given to patient/caregiver;Explained to patient/caregiver        Admission Reconciliation is Not Complete  Discharge Reconciliation is Not Complete Admission Reconciliation is Completed  Discharge Reconciliation is Completed

## 2024-04-09 LAB
ALLENS TEST BLOOD GAS (OHS): YES
ANION GAP SERPL CALC-SCNC: 9 MEQ/L
BACTERIA UR CULT: NO GROWTH
BASE EXCESS BLD CALC-SCNC: 7.6 MMOL/L (ref -2–2)
BASOPHILS # BLD AUTO: 0.02 X10(3)/MCL
BASOPHILS NFR BLD AUTO: 0.2 %
BLOOD GAS SAMPLE TYPE (OHS): ABNORMAL
BUN SERPL-MCNC: 29.9 MG/DL (ref 8.4–25.7)
CA-I BLD-SCNC: 1.09 MMOL/L (ref 1.12–1.23)
CALCIUM SERPL-MCNC: 8.9 MG/DL (ref 8.8–10)
CHLORIDE SERPL-SCNC: 102 MMOL/L (ref 98–107)
CO2 BLDA-SCNC: 32.2 MMOL/L
CO2 SERPL-SCNC: 29 MMOL/L (ref 23–31)
COHGB MFR BLDA: 2.3 % (ref 0.5–1.5)
CREAT SERPL-MCNC: 0.81 MG/DL (ref 0.73–1.18)
CREAT/UREA NIT SERPL: 37
DRAWN BY BLOOD GAS (OHS): ABNORMAL
EOSINOPHIL # BLD AUTO: 0.15 X10(3)/MCL (ref 0–0.9)
EOSINOPHIL NFR BLD AUTO: 1.4 %
ERYTHROCYTE [DISTWIDTH] IN BLOOD BY AUTOMATED COUNT: 16.9 % (ref 11.5–17)
GFR SERPLBLD CREATININE-BSD FMLA CKD-EPI: >60 MLS/MIN/1.73/M2
GLUCOSE SERPL-MCNC: 83 MG/DL (ref 82–115)
HCO3 BLDA-SCNC: 31 MMOL/L (ref 22–26)
HCT VFR BLD AUTO: 30.1 % (ref 42–52)
HGB BLD-MCNC: 9.2 G/DL (ref 14–18)
IMM GRANULOCYTES # BLD AUTO: 0.06 X10(3)/MCL (ref 0–0.04)
IMM GRANULOCYTES NFR BLD AUTO: 0.6 %
LPM (OHS): 10
LYMPHOCYTES # BLD AUTO: 1.65 X10(3)/MCL (ref 0.6–4.6)
LYMPHOCYTES NFR BLD AUTO: 15.2 %
MCH RBC QN AUTO: 30 PG (ref 27–31)
MCHC RBC AUTO-ENTMCNC: 30.6 G/DL (ref 33–36)
MCV RBC AUTO: 98 FL (ref 80–94)
METHGB MFR BLDA: 0.9 % (ref 0.4–1.5)
MONOCYTES # BLD AUTO: 0.9 X10(3)/MCL (ref 0.1–1.3)
MONOCYTES NFR BLD AUTO: 8.3 %
NEUTROPHILS # BLD AUTO: 8.05 X10(3)/MCL (ref 2.1–9.2)
NEUTROPHILS NFR BLD AUTO: 74.3 %
NRBC BLD AUTO-RTO: 0 %
O2 HB BLOOD GAS (OHS): 83.2 % (ref 94–97)
OHS QRS DURATION: 96 MS
OHS QTC CALCULATION: 481 MS
OXYGEN DEVICE BLOOD GAS (OHS): ABNORMAL
OXYHGB MFR BLDA: 9.5 G/DL (ref 12–16)
PCO2 BLDA: 38 MMHG (ref 35–45)
PH BLDA: 7.52 [PH] (ref 7.35–7.45)
PLATELET # BLD AUTO: 164 X10(3)/MCL (ref 130–400)
PMV BLD AUTO: 10 FL (ref 7.4–10.4)
PO2 BLDA: 50 MMHG (ref 80–100)
POTASSIUM BLOOD GAS (OHS): 3.3 MMOL/L (ref 3.5–5)
POTASSIUM SERPL-SCNC: 3.7 MMOL/L (ref 3.5–5.1)
RBC # BLD AUTO: 3.07 X10(6)/MCL (ref 4.7–6.1)
SAMPLE SITE BLOOD GAS (OHS): ABNORMAL
SAO2 % BLDA: 89.1 %
SODIUM BLOOD GAS (OHS): 136 MMOL/L (ref 137–145)
SODIUM SERPL-SCNC: 140 MMOL/L (ref 136–145)
TRIGL SERPL-MCNC: 48 MG/DL (ref 34–140)
TROPONIN I SERPL-MCNC: <0.01 NG/ML (ref 0–0.04)
WBC # SPEC AUTO: 10.83 X10(3)/MCL (ref 4.5–11.5)

## 2024-04-09 PROCEDURE — 25000003 PHARM REV CODE 250: Performed by: INTERNAL MEDICINE

## 2024-04-09 PROCEDURE — 84478 ASSAY OF TRIGLYCERIDES: CPT

## 2024-04-09 PROCEDURE — 25000242 PHARM REV CODE 250 ALT 637 W/ HCPCS: Performed by: INTERNAL MEDICINE

## 2024-04-09 PROCEDURE — 94760 N-INVAS EAR/PLS OXIMETRY 1: CPT

## 2024-04-09 PROCEDURE — 93010 ELECTROCARDIOGRAM REPORT: CPT | Mod: ,,, | Performed by: INTERNAL MEDICINE

## 2024-04-09 PROCEDURE — 80048 BASIC METABOLIC PNL TOTAL CA: CPT | Performed by: HOSPITALIST

## 2024-04-09 PROCEDURE — 25000003 PHARM REV CODE 250: Performed by: STUDENT IN AN ORGANIZED HEALTH CARE EDUCATION/TRAINING PROGRAM

## 2024-04-09 PROCEDURE — 21400001 HC TELEMETRY ROOM

## 2024-04-09 PROCEDURE — 36600 WITHDRAWAL OF ARTERIAL BLOOD: CPT

## 2024-04-09 PROCEDURE — 27000221 HC OXYGEN, UP TO 24 HOURS

## 2024-04-09 PROCEDURE — 25000003 PHARM REV CODE 250

## 2024-04-09 PROCEDURE — 63600175 PHARM REV CODE 636 W HCPCS: Performed by: HOSPITALIST

## 2024-04-09 PROCEDURE — 84484 ASSAY OF TROPONIN QUANT: CPT | Performed by: HOSPITALIST

## 2024-04-09 PROCEDURE — 25000003 PHARM REV CODE 250: Performed by: HOSPITALIST

## 2024-04-09 PROCEDURE — 82803 BLOOD GASES ANY COMBINATION: CPT

## 2024-04-09 PROCEDURE — 85025 COMPLETE CBC W/AUTO DIFF WBC: CPT | Performed by: HOSPITALIST

## 2024-04-09 PROCEDURE — 63600175 PHARM REV CODE 636 W HCPCS

## 2024-04-09 PROCEDURE — 63600175 PHARM REV CODE 636 W HCPCS: Performed by: INTERNAL MEDICINE

## 2024-04-09 PROCEDURE — 99900035 HC TECH TIME PER 15 MIN (STAT)

## 2024-04-09 PROCEDURE — 93005 ELECTROCARDIOGRAM TRACING: CPT

## 2024-04-09 PROCEDURE — 27100171 HC OXYGEN HIGH FLOW UP TO 24 HOURS

## 2024-04-09 PROCEDURE — C9113 INJ PANTOPRAZOLE SODIUM, VIA: HCPCS | Performed by: INTERNAL MEDICINE

## 2024-04-09 PROCEDURE — 94640 AIRWAY INHALATION TREATMENT: CPT

## 2024-04-09 PROCEDURE — 99900031 HC PATIENT EDUCATION (STAT)

## 2024-04-09 RX ORDER — METOPROLOL SUCCINATE 25 MG/1
12.5 TABLET, EXTENDED RELEASE ORAL DAILY
Qty: 45 TABLET | Refills: 3 | Status: SHIPPED | OUTPATIENT
Start: 2024-04-09 | End: 2025-04-09

## 2024-04-09 RX ORDER — FUROSEMIDE 40 MG/1
40 TABLET ORAL DAILY
Qty: 30 TABLET | Refills: 11 | Status: SHIPPED | OUTPATIENT
Start: 2024-04-09 | End: 2024-04-23 | Stop reason: SDUPTHER

## 2024-04-09 RX ORDER — FOLIC ACID 1 MG/1
1 TABLET ORAL DAILY
Qty: 30 TABLET | Refills: 11 | Status: SHIPPED | OUTPATIENT
Start: 2024-04-09 | End: 2025-04-09

## 2024-04-09 RX ORDER — ALPRAZOLAM 0.25 MG/1
0.25 TABLET ORAL 3 TIMES DAILY PRN
Qty: 21 TABLET | Refills: 0 | Status: SHIPPED | OUTPATIENT
Start: 2024-04-09 | End: 2024-06-11 | Stop reason: ALTCHOICE

## 2024-04-09 RX ORDER — MIDODRINE HYDROCHLORIDE 10 MG/1
10 TABLET ORAL
Qty: 90 TABLET | Refills: 11 | Status: SHIPPED | OUTPATIENT
Start: 2024-04-09 | End: 2025-04-09

## 2024-04-09 RX ORDER — ATORVASTATIN CALCIUM 80 MG/1
80 TABLET, FILM COATED ORAL DAILY
Qty: 90 TABLET | Refills: 3 | Status: SHIPPED | OUTPATIENT
Start: 2024-04-09 | End: 2025-04-09

## 2024-04-09 RX ORDER — FUROSEMIDE 10 MG/ML
40 INJECTION INTRAMUSCULAR; INTRAVENOUS ONCE
Status: COMPLETED | OUTPATIENT
Start: 2024-04-09 | End: 2024-04-09

## 2024-04-09 RX ORDER — PREDNISONE 10 MG/1
10 TABLET ORAL DAILY
Qty: 5 TABLET | Refills: 0 | Status: SHIPPED | OUTPATIENT
Start: 2024-04-09 | End: 2024-04-14

## 2024-04-09 RX ORDER — ALPRAZOLAM 0.25 MG/1
0.25 TABLET ORAL 3 TIMES DAILY PRN
Status: DISCONTINUED | OUTPATIENT
Start: 2024-04-09 | End: 2024-04-12 | Stop reason: HOSPADM

## 2024-04-09 RX ORDER — ASPIRIN 81 MG/1
81 TABLET ORAL DAILY
Qty: 30 TABLET | Refills: 11 | Status: SHIPPED | OUTPATIENT
Start: 2024-04-09 | End: 2025-04-09

## 2024-04-09 RX ADMIN — IPRATROPIUM BROMIDE AND ALBUTEROL SULFATE 3 ML: 2.5; .5 SOLUTION RESPIRATORY (INHALATION) at 07:04

## 2024-04-09 RX ADMIN — PHENAZOPYRIDINE HYDROCHLORIDE 100 MG: 100 TABLET ORAL at 04:04

## 2024-04-09 RX ADMIN — IPRATROPIUM BROMIDE AND ALBUTEROL SULFATE 3 ML: 2.5; .5 SOLUTION RESPIRATORY (INHALATION) at 04:04

## 2024-04-09 RX ADMIN — MIDODRINE HYDROCHLORIDE 10 MG: 5 TABLET ORAL at 04:04

## 2024-04-09 RX ADMIN — PREDNISONE 10 MG: 10 TABLET ORAL at 09:04

## 2024-04-09 RX ADMIN — METOPROLOL SUCCINATE 12.5 MG: 25 TABLET, EXTENDED RELEASE ORAL at 09:04

## 2024-04-09 RX ADMIN — ASPIRIN 81 MG: 81 TABLET, COATED ORAL at 09:04

## 2024-04-09 RX ADMIN — MIDODRINE HYDROCHLORIDE 10 MG: 5 TABLET ORAL at 09:04

## 2024-04-09 RX ADMIN — IPRATROPIUM BROMIDE AND ALBUTEROL SULFATE 3 ML: 2.5; .5 SOLUTION RESPIRATORY (INHALATION) at 12:04

## 2024-04-09 RX ADMIN — THERA TABS 1 TABLET: TAB at 09:04

## 2024-04-09 RX ADMIN — FOLIC ACID 1 MG: 1 TABLET ORAL at 09:04

## 2024-04-09 RX ADMIN — ALPRAZOLAM 0.25 MG: 0.25 TABLET ORAL at 08:04

## 2024-04-09 RX ADMIN — RIVAROXABAN 20 MG: 10 TABLET, FILM COATED ORAL at 04:04

## 2024-04-09 RX ADMIN — PHENAZOPYRIDINE HYDROCHLORIDE 100 MG: 100 TABLET ORAL at 09:04

## 2024-04-09 RX ADMIN — ALPRAZOLAM 0.25 MG: 0.25 TABLET ORAL at 09:04

## 2024-04-09 RX ADMIN — CHLORHEXIDINE GLUCONATE 0.12% ORAL RINSE 15 ML: 1.2 LIQUID ORAL at 08:04

## 2024-04-09 RX ADMIN — Medication 6 MG: at 08:04

## 2024-04-09 RX ADMIN — FUROSEMIDE 40 MG: 40 TABLET ORAL at 09:04

## 2024-04-09 RX ADMIN — LORAZEPAM 2 MG: 1 TABLET ORAL at 05:04

## 2024-04-09 RX ADMIN — ALPRAZOLAM 0.25 MG: 0.25 TABLET ORAL at 04:04

## 2024-04-09 RX ADMIN — ATORVASTATIN CALCIUM 80 MG: 40 TABLET, FILM COATED ORAL at 09:04

## 2024-04-09 RX ADMIN — MORPHINE SULFATE 2 MG: 4 INJECTION, SOLUTION INTRAMUSCULAR; INTRAVENOUS at 08:04

## 2024-04-09 RX ADMIN — FUROSEMIDE 40 MG: 10 INJECTION, SOLUTION INTRAMUSCULAR; INTRAVENOUS at 09:04

## 2024-04-09 RX ADMIN — PANTOPRAZOLE SODIUM 40 MG: 40 INJECTION, POWDER, FOR SOLUTION INTRAVENOUS at 08:04

## 2024-04-09 RX ADMIN — PANTOPRAZOLE SODIUM 40 MG: 40 INJECTION, POWDER, FOR SOLUTION INTRAVENOUS at 09:04

## 2024-04-09 NOTE — PROGRESS NOTES
Inpatient Nutrition Assessment    Admit Date: 3/17/2024   Total duration of encounter: 23 days   Patient Age: 68 y.o.    Nutrition Recommendation/Prescription     Continue current diet (Diet Heart Healthy,Diet Cardiac) as tolerated.   Boost Plus (provides 360 kcal, 14 g protein per serving) TID (chocolate)  Ge (provides 90 kcal, 2.5 g protein per serving) BID.    Communication of Recommendations: reviewed with patient    Nutrition Assessment     Malnutrition Assessment/Nutrition-Focused Physical Exam    Malnutrition Context: acute illness or injury (03/19/24 1033)  Malnutrition Level:  (does not meet criteria) (03/19/24 1033)  Energy Intake (Malnutrition):  (unable to eval) (03/19/24 1033)  Weight Loss (Malnutrition):  (unable to eval) (03/19/24 1033)  Subcutaneous Fat (Malnutrition):  (does not meet criteria) (03/19/24 1033)           Muscle Mass (Malnutrition):  (does not meet criteria) (03/19/24 1033)                          Fluid Accumulation (Malnutrition):  (does not meet criteria) (03/19/24 1033)        A minimum of two characteristics is recommended for diagnosis of either severe or non-severe malnutrition.    Chart Review    Reason Seen: follow-up    Malnutrition Screening Tool Results   Have you recently lost weight without trying?: No  Have you been eating poorly because of a decreased appetite?: No   MST Score: 0   Diagnosis:  Scrotal abscess, with component of early Claudine's, s/p exploration, I&D, and left orchiectomy 03/18  Severe sepsis from above, resolving  Questionable RLL post-viral pneumonia, Human Metapneumovirus positive, resolving  Acute hypoxic respiratory failure from above  Cardiac arrest with ventricular tachycardia requiring defibrillation x3 (03/18/2024)  Acute on chronic diastolic heart failure  AFib RVR  NSTMI, unspecified  Partial bowel obstruction  Normocytic anemia.    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  Mild ELIZABETH, resolved    Relevant Medical History: nonischemic cardiomyopathy, systolic heart failure with an EF of 41%, VHD, chronic AFib on Xarelto, peripheral arterial disease, COPD, HTN, and a large left testicle hydrocel     Scheduled Medications:  albuterol-ipratropium, 3 mL, Q4H  aspirin, 81 mg, Daily  atorvastatin, 80 mg, Daily  chlorhexidine, 15 mL, BID  folic acid, 1 mg, Daily  furosemide, 40 mg, Daily  metoprolol succinate, 12.5 mg, Daily  midodrine, 10 mg, TID WM  multivitamin, 1 tablet, Daily  pantoprazole, 40 mg, BID  phenazopyridine, 100 mg, TID WM  predniSONE, 10 mg, Daily  rivaroxaban, 20 mg, Daily with dinner    Continuous Infusions: none       PRN Medications: 0.9%  NaCl infusion (for blood administration), 0.9%  NaCl infusion (for blood administration), acetaminophen, ALPRAZolam, dextrose 10 % in water (D10W), dextrose 10 % in water (D10W), dextrose 10%, dextrose 10%, hydrALAZINE, insulin aspart U-100, ipratropium, LORazepam, melatonin, morphine, ondansetron, oxyCODONE, sodium chloride 0.9%, sodium chloride 0.9%    Calorie Containing IV Medications: no significant kcals from medications at this time    Recent Labs   Lab 04/03/24  0337 04/04/24  0407 04/05/24  0501 04/06/24  0340 04/07/24  0324 04/08/24  0514 04/09/24  0124 04/09/24  0616   NA  --  138 141  --  140  --   --  140   K  --  4.0 4.0  --  4.4  --   --  3.7   CALCIUM  --  8.4* 8.6*  --  8.8  --   --  8.9   CHLORIDE  --  104 102  --  102  --   --  102   CO2  --  25 30  --  30  --   --  29   BUN  --  30.0* 30.9*  --  39.4*  --   --  29.9*   CREATININE  --  0.88 0.85  --  0.85  --   --  0.81   EGFRNORACEVR  --  >60 >60  --  >60  --   --  >60   GLUCOSE  --  101 109  --  104  --   --  83   TRIG 57 57 42 42 40 48 48  --    WBC  --   --  6.21  --  6.64  --   --  10.83   HGB  --   --  9.4*  --  9.3*  --   --  9.2*   HCT  --   --  30.5*  --  29.5*  --   --  30.1*       Nutrition Orders:  Diet Heart  "Healthy  Diet Cardiac  Dietary nutrition supplements Ge - Fruit Punch; BID,Dietary nutrition supplements Boost Plus Nutritional Drink - Rich Chocolate; TID     Appetite/Oral Intake: good/% of meals  Factors Affecting Nutritional Intake: none identified  Food/Jehovah's witness/Cultural Preferences: unable to obtain  Food Allergies: none reported  Last Bowel Movement: 24  Wound(s): incision noted    Comments    3/19/24: Discussed with RN. Will provide tube feeding recommendations for when appropriate to start. No kcal from meds at this time. Noted partial bowel obstruction. Will monitor for need for PN.     3/21/24: Pt now extubated. Verified UBW. Plans for diet advancement. Full liquids just started. Pt willing to drink ONS. Will also send Ge for wound healing.     3/27/24 Patient reports a good appetite and good intake of meals/supplements.    24 Patient with good appetite and PO intakes. Pt noted to have some diarrhea yesterday.     24 Good appetite continues, observed tray at bedside had 100% consumed, patient reports good intake of Boost and Ge as well, possible discharge tomorrow.    Anthropometrics    Height: 5' 11" (180.3 cm), Height Method: Stated  Last Weight: 94.4 kg (208 lb 1.8 oz) (24 0424), Weight Method: Bed Scale  BMI (Calculated): 29  BMI Classification: overweight (BMI 25-29.9)        Ideal Body Weight (IBW), Male: 172 lb     % Ideal Body Weight, Male (lb): 115.1 %                 Usual Body Weight (UBW), k.45 kg-90.7 kg  % Usual Body Weight: 107.63  % Weight Change From Usual Weight: 7.4 %  Usual Weight Provided By: patient and patient denies unintentional weight loss    Wt Readings from Last 5 Encounters:   24 94.4 kg (208 lb 1.8 oz)   24 92.1 kg (203 lb)   24 92.4 kg (203 lb 11.3 oz)   11/15/23 90.7 kg (200 lb)   23 91.2 kg (201 lb)     Weight Change(s) Since Admission:   (3/27) admission weight (77.1 kg) 'estimated', likely inaccurate, bed " weight (95.0 kg) taken during rounds  Wt Readings from Last 1 Encounters:   04/08/24 0424 94.4 kg (208 lb 1.8 oz)   04/07/24 0612 96.2 kg (212 lb 1.3 oz)   04/06/24 0748 95.6 kg (210 lb 12.2 oz)   04/05/24 0618 95.5 kg (210 lb 8.6 oz)   03/27/24 1315 95 kg (209 lb 7 oz)   03/25/24 0340 89.8 kg (197 lb 15.6 oz)   03/22/24 1346 89.8 kg (198 lb)   03/21/24 1113 90 kg (198 lb 6.6 oz)   03/18/24 2202 90 kg (198 lb 6.6 oz)   03/17/24 2036 77.1 kg (170 lb)   Admit Weight: 77.1 kg (170 lb) (03/17/24 2036), Weight Method: Estimated    Enteral Nutrition Patient not receiving enteral nutrition at this time.    Parenteral Nutrition Patient not receiving parenteral nutrition support at this time.    Evaluation of Received Nutrient Intake    Calories: meeting estimated needs  Protein: meeting estimated needs    Patient Education Not applicable.    Nutrition Diagnosis     PES: Inadequate oral intake related to acute illness as evidenced by NPO since extubation. (resolved)     Nutrition Interventions     Intervention(s): general/healthful diet, commercial beverage, and collaboration with other providers    Goal: Meet greater than 80% of nutritional needs by follow-up. (goal met)  Goal: Tolerate enteral feeding at goal rate by follow-up. (goal discontinued)    Nutrition Goals & Monitoring     Dietitian will monitor: food and beverage intake and weight    Nutrition Risk/Follow-Up: low (follow-up in 5-7 days)   Please consult if re-assessment needed sooner.

## 2024-04-09 NOTE — PROGRESS NOTES
[facility] Hospital Medicine Progress Note        Chief Complaint: Inpatient Follow-up     HPI:   68-year-old male with a history that includes VHD, nonischemic cardiomyopathy (with an EF of 41%),  chronic AFib on Xarelto, peripheral arterial disease, COPD, and a large left testicle hydrocele, presented to the ED 3/17 complaining of what he thought was rectal bleeding as he was noting blood in the toilet and running down his legs.  Ultimately was found that the bleeding was coming from the posterior aspect of his scrotum from a superficial scrotal ulceration.  He also complained of persistent testicular swelling and pain.  Doppler ultrasound was significantly limited, but could not rule out the presence of torsion.  Urology was consulted and they made decision to proceed with emergent scrotal exploration.  While still in PACU, patient was hypotensive with blood persistently 80s/50s, this despite fluid resuscitation.   Patient subsequently transferred to the ICU for vasopressor support.  Additionally developed cardiac arrest with ventricular tachycardia requiring defibrillation x3 (03/18/2024), with ROSC.   He was continued on IV antibiotics, and eventually weaned off pressors.  Additionally FOB noted to be positive.  GI consulted and planned for EGD.  Cardiology involved as well, with plans for Ischemic evaluation.  Cleared for AC and started on FD lovenox.  Underwent EGD 3/22 noting grade B reflux esophagitis, chronic gastritis, chronic duodenitis.  GI considering outpatient colonoscopy, recommended Carafate for 7 days and continued PPI BID for a month on discharge and then daily for 3 months.  Cardiology on board, awaiting ischemic evaluation.  Urology continued to follow.  Patient continued on IV antibiotics; working with therapy.  On  evening of 3/28 he developed worsening hypoxemia.  CXR noted bilateral scattered opacities, and he was requiring BiPAP for oxygenation.  Antibiotics  broadened, and he was initiated on IV Lasix.  He subsequently diuresed well (a negative fluid balance >5L over 48hrs).  Subsequently transitioned off BiPAP back to supplemental oxygen, and remained stable.      Interval Hx:  Patient was very anxious this morning.  Hyperventilating some.  Dropping his saturation down in the upper 80s.  Did not sound particularly wet to me this morning.  No wheezing.  Will check a chest x-ray just to see if any acute changes but feel it was high degree of anxiety.  Patient was nervous about going home.  Told him that we can give it 1 more day if need be     Objective/physical exam:  General: In no acute distress, afebrile  Chest: cta b/l   Heart: RRR, +S1, S2, no appreciable murmur  Abdomen: Soft, nontender, BS +  MSK: Warm, no lower extremity edema, no clubbing or cyanosis  Neurologic: Alert and oriented x4, Cranial nerve II-XII intact, Strength 5/5 in all 4 extremities he was new     VITAL SIGNS: 24 HRS MIN & MAX LAST   Temp  Min: 97.7 °F (36.5 °C)  Max: 98.6 °F (37 °C) 97.7 °F (36.5 °C)   BP  Min: 108/69  Max: 151/90 (!) 151/90   Pulse  Min: 62  Max: 109  77   Resp  Min: 17  Max: 23 (!) 23   SpO2  Min: 88 %  Max: 100 % (!) 91 %       Recent Labs   Lab 04/05/24  0501 04/07/24  0324   WBC 6.21 6.64   RBC 3.10* 3.03*   HGB 9.4* 9.3*   HCT 30.5* 29.5*   MCV 98.4* 97.4*   MCH 30.3 30.7   MCHC 30.8* 31.5*   RDW 16.8 16.9    201   MPV 11.5* 11.0*       Recent Labs   Lab 04/04/24  0407 04/05/24  0501 04/07/24  0324    141 140   K 4.0 4.0 4.4   CO2 25 30 30   BUN 30.0* 30.9* 39.4*   CREATININE 0.88 0.85 0.85   CALCIUM 8.4* 8.6* 8.8          Microbiology Results (last 7 days)       Procedure Component Value Units Date/Time    Urine culture [[id number]] Collected: 04/07/24 2023    Order Status: Completed Specimen: Urine Updated: 04/08/24 0633     Urine Culture No Growth At 24 Hours             Radiology:  X-Ray Chest 1 View  Narrative: EXAMINATION:  XR CHEST 1 VIEW    CPT  40270    CLINICAL HISTORY:  chf;    COMPARISON:  April 5, 2024    FINDINGS:  Cardiomediastinal silhouette and pleuroparenchymal changes are essentially unchanged as compared with the previous exam  Impression: No significant change    Electronically signed by: Lito Hurst  Date:    04/08/2024  Time:    08:02      Scheduled Med:   albuterol-ipratropium  3 mL Nebulization Q4H    aspirin  81 mg Oral Daily    atorvastatin  80 mg Oral Daily    chlorhexidine  15 mL Mouth/Throat BID    folic acid  1 mg Oral Daily    furosemide  40 mg Oral Daily    metoprolol succinate  12.5 mg Oral Daily    midodrine  10 mg Oral TID WM    multivitamin  1 tablet Oral Daily    pantoprazole  40 mg Intravenous BID    phenazopyridine  100 mg Oral TID WM    predniSONE  10 mg Oral Daily    rivaroxaban  20 mg Oral Daily with dinner        Continuous Infusions:       PRN Meds:  0.9%  NaCl infusion (for blood administration), 0.9%  NaCl infusion (for blood administration), acetaminophen, ALPRAZolam, dextrose 10 % in water (D10W), dextrose 10 % in water (D10W), dextrose 10%, dextrose 10%, hydrALAZINE, insulin aspart U-100, ipratropium, LORazepam, melatonin, morphine, ondansetron, oxyCODONE, sodium chloride 0.9%, sodium chloride 0.9%       Assessment/Plan:   Scrotal abscess, with component of early Claudine's, s/p exploration, I&D, and left orchiectomy 03/18.  Severe sepsis from above, resolving  Questionable RLL post-viral Pneumonia, Human Metapneumovirus positive, resolving  Acute hypoxic respiratory failure from above  Cardiac arrest with ventricular tachycardia requiring defibrillation x3 (03/18/2024)  Acute on chronic diastolic heart failure  AFib RVR  NSTMI, unspecified  Partial bowel obstruction  Normocytic anemia.    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  Yesterday's without  any new changes.    Home O2 has been arranged.  Patient was very adamant on going home but when discussing potentially home today now very anxious.  I have started him with some low-dose Xanax see he was helps take the edge off and let him calling bound.    He was completed all antibiotics.  He would he was on a steroid taper.  Plan for 10 mg for another 5 days.  Continue Xarelto, aspirin, and statin per Neurology recommendations.  He also need close follow up with Cardiology.  Will check a set of labs this morning.    If he was breathing stabilizes and he was more calm could potentially go home later this afternoon    Update 0920:  Increased pulmonary edema on CXR and po2 down with ABG.  Will give extra dose of IV lasix this morning.  No DC today.  Can reconsider tomorrow depending on how he responds.     Critical care diagnosis: acute systolic heart failure requiring IV diuretics  Critical care interventions: hands on evaluation, review of labs/radiographs/records and discussions with family  Critical care time spent: >32 minutes      Tha Edmond MD   04/09/2024     All diagnosis and differential diagnosis have been reviewed; assessment and plan has been documented; I have personally reviewed the labs and test results that are presently available; I have reviewed the patients medication list; I have reviewed the consulting providers response and recommendations. I have reviewed or attempted to review medical records based upon their availability    All of the patient's questions have been  addressed and answered. Patient's is agreeable to the above stated plan. I will continue to monitor closely and make adjustments to medical management as needed.  _____________________________________________________________________

## 2024-04-10 LAB — TRIGL SERPL-MCNC: 46 MG/DL (ref 34–140)

## 2024-04-10 PROCEDURE — 25000003 PHARM REV CODE 250

## 2024-04-10 PROCEDURE — 25000003 PHARM REV CODE 250: Performed by: HOSPITALIST

## 2024-04-10 PROCEDURE — 25000242 PHARM REV CODE 250 ALT 637 W/ HCPCS: Performed by: INTERNAL MEDICINE

## 2024-04-10 PROCEDURE — 99900035 HC TECH TIME PER 15 MIN (STAT)

## 2024-04-10 PROCEDURE — 27000221 HC OXYGEN, UP TO 24 HOURS

## 2024-04-10 PROCEDURE — 25000003 PHARM REV CODE 250: Performed by: INTERNAL MEDICINE

## 2024-04-10 PROCEDURE — 63600175 PHARM REV CODE 636 W HCPCS: Performed by: HOSPITALIST

## 2024-04-10 PROCEDURE — 84478 ASSAY OF TRIGLYCERIDES: CPT

## 2024-04-10 PROCEDURE — C9113 INJ PANTOPRAZOLE SODIUM, VIA: HCPCS | Performed by: INTERNAL MEDICINE

## 2024-04-10 PROCEDURE — 63600175 PHARM REV CODE 636 W HCPCS: Performed by: INTERNAL MEDICINE

## 2024-04-10 PROCEDURE — 21400001 HC TELEMETRY ROOM

## 2024-04-10 PROCEDURE — 25000003 PHARM REV CODE 250: Performed by: STUDENT IN AN ORGANIZED HEALTH CARE EDUCATION/TRAINING PROGRAM

## 2024-04-10 PROCEDURE — 94640 AIRWAY INHALATION TREATMENT: CPT

## 2024-04-10 PROCEDURE — 94760 N-INVAS EAR/PLS OXIMETRY 1: CPT

## 2024-04-10 RX ORDER — FUROSEMIDE 10 MG/ML
40 INJECTION INTRAMUSCULAR; INTRAVENOUS EVERY 8 HOURS
Status: DISCONTINUED | OUTPATIENT
Start: 2024-04-10 | End: 2024-04-11

## 2024-04-10 RX ADMIN — PANTOPRAZOLE SODIUM 40 MG: 40 INJECTION, POWDER, FOR SOLUTION INTRAVENOUS at 08:04

## 2024-04-10 RX ADMIN — PHENAZOPYRIDINE HYDROCHLORIDE 100 MG: 100 TABLET ORAL at 08:04

## 2024-04-10 RX ADMIN — FUROSEMIDE 40 MG: 10 INJECTION, SOLUTION INTRAMUSCULAR; INTRAVENOUS at 05:04

## 2024-04-10 RX ADMIN — PANTOPRAZOLE SODIUM 40 MG: 40 INJECTION, POWDER, FOR SOLUTION INTRAVENOUS at 09:04

## 2024-04-10 RX ADMIN — CHLORHEXIDINE GLUCONATE 0.12% ORAL RINSE 15 ML: 1.2 LIQUID ORAL at 08:04

## 2024-04-10 RX ADMIN — IPRATROPIUM BROMIDE AND ALBUTEROL SULFATE 3 ML: 2.5; .5 SOLUTION RESPIRATORY (INHALATION) at 03:04

## 2024-04-10 RX ADMIN — RIVAROXABAN 20 MG: 10 TABLET, FILM COATED ORAL at 05:04

## 2024-04-10 RX ADMIN — ALPRAZOLAM 0.25 MG: 0.25 TABLET ORAL at 09:04

## 2024-04-10 RX ADMIN — FUROSEMIDE 40 MG: 10 INJECTION, SOLUTION INTRAMUSCULAR; INTRAVENOUS at 08:04

## 2024-04-10 RX ADMIN — PREDNISONE 10 MG: 10 TABLET ORAL at 08:04

## 2024-04-10 RX ADMIN — OXYCODONE HYDROCHLORIDE 5 MG: 5 TABLET ORAL at 08:04

## 2024-04-10 RX ADMIN — THERA TABS 1 TABLET: TAB at 08:04

## 2024-04-10 RX ADMIN — IPRATROPIUM BROMIDE AND ALBUTEROL SULFATE 3 ML: 2.5; .5 SOLUTION RESPIRATORY (INHALATION) at 07:04

## 2024-04-10 RX ADMIN — IPRATROPIUM BROMIDE AND ALBUTEROL SULFATE 3 ML: 2.5; .5 SOLUTION RESPIRATORY (INHALATION) at 08:04

## 2024-04-10 RX ADMIN — PHENAZOPYRIDINE HYDROCHLORIDE 100 MG: 100 TABLET ORAL at 05:04

## 2024-04-10 RX ADMIN — ATORVASTATIN CALCIUM 80 MG: 40 TABLET, FILM COATED ORAL at 08:04

## 2024-04-10 RX ADMIN — IPRATROPIUM BROMIDE AND ALBUTEROL SULFATE 3 ML: 2.5; .5 SOLUTION RESPIRATORY (INHALATION) at 11:04

## 2024-04-10 RX ADMIN — IPRATROPIUM BROMIDE AND ALBUTEROL SULFATE 3 ML: 2.5; .5 SOLUTION RESPIRATORY (INHALATION) at 04:04

## 2024-04-10 RX ADMIN — MIDODRINE HYDROCHLORIDE 10 MG: 5 TABLET ORAL at 12:04

## 2024-04-10 RX ADMIN — MIDODRINE HYDROCHLORIDE 10 MG: 5 TABLET ORAL at 08:04

## 2024-04-10 RX ADMIN — ASPIRIN 81 MG: 81 TABLET, COATED ORAL at 08:04

## 2024-04-10 RX ADMIN — PHENAZOPYRIDINE HYDROCHLORIDE 100 MG: 100 TABLET ORAL at 12:04

## 2024-04-10 RX ADMIN — CHLORHEXIDINE GLUCONATE 0.12% ORAL RINSE 15 ML: 1.2 LIQUID ORAL at 09:04

## 2024-04-10 RX ADMIN — MIDODRINE HYDROCHLORIDE 10 MG: 5 TABLET ORAL at 05:04

## 2024-04-10 RX ADMIN — Medication 6 MG: at 09:04

## 2024-04-10 RX ADMIN — METOPROLOL SUCCINATE 12.5 MG: 25 TABLET, EXTENDED RELEASE ORAL at 08:04

## 2024-04-10 RX ADMIN — FOLIC ACID 1 MG: 1 TABLET ORAL at 08:04

## 2024-04-10 NOTE — PROGRESS NOTES
[facility] Hospital Medicine Progress Note        Chief Complaint: Inpatient Follow-up     HPI:   68-year-old male with a history that includes VHD, nonischemic cardiomyopathy (with an EF of 41%),  chronic AFib on Xarelto, peripheral arterial disease, COPD, and a large left testicle hydrocele, presented to the ED 3/17 complaining of what he thought was rectal bleeding as he was noting blood in the toilet and running down his legs.  Ultimately was found that the bleeding was coming from the posterior aspect of his scrotum from a superficial scrotal ulceration.  He also complained of persistent testicular swelling and pain.  Doppler ultrasound was significantly limited, but could not rule out the presence of torsion.  Urology was consulted and they made decision to proceed with emergent scrotal exploration.  While still in PACU, patient was hypotensive with blood persistently 80s/50s, this despite fluid resuscitation.   Patient subsequently transferred to the ICU for vasopressor support.  Additionally developed cardiac arrest with ventricular tachycardia requiring defibrillation x3 (03/18/2024), with ROSC.   He was continued on IV antibiotics, and eventually weaned off pressors.  Additionally FOB noted to be positive.  GI consulted and planned for EGD.  Cardiology involved as well, with plans for Ischemic evaluation.  Cleared for AC and started on FD lovenox.  Underwent EGD 3/22 noting grade B reflux esophagitis, chronic gastritis, chronic duodenitis.  GI considering outpatient colonoscopy, recommended Carafate for 7 days and continued PPI BID for a month on discharge and then daily for 3 months.  Cardiology on board, awaiting ischemic evaluation.  Urology continued to follow.  Patient continued on IV antibiotics; working with therapy.  On  evening of 3/28 he developed worsening hypoxemia.  CXR noted bilateral scattered opacities, and he was requiring BiPAP for oxygenation.  Antibiotics  broadened, and he was initiated on IV Lasix.  He subsequently diuresed well (a negative fluid balance >5L over 48hrs).  Subsequently transitioned off BiPAP back to supplemental oxygen, and remained stable.      Interval Hx:  Patient was appears much more comfortable than yesterday.  Received a dose of IV Lasix with good diuresis.  Still some coarseness on exam.  Will continue with IV diuretics today and try to wean his oxygen back down.  He has been arranged for home O2 we would like to get him back to either 1 or 2 L prior to discharging home with home health.  Could potentially switch over to oral diuretics tomorrow.  Vital signs are stable     Objective/physical exam:  General: In no acute distress, afebrile  Chest:  Coarse bilaterally  Heart: RRR, +S1, S2, no appreciable murmur  Abdomen: Soft, nontender, BS +  MSK: Warm, no lower extremity edema, no clubbing or cyanosis  Neurologic: Alert and oriented x4, Cranial nerve II-XII intact, Strength 5/5 in all 4 extremities he was new    VITAL SIGNS: 24 HRS MIN & MAX LAST   Temp  Min: 97.7 °F (36.5 °C)  Max: 98.9 °F (37.2 °C) 98.2 °F (36.8 °C)   BP  Min: 100/66  Max: 149/85 125/61   Pulse  Min: 65  Max: 84  78   Resp  Min: 16  Max: 30 18   SpO2  Min: 76 %  Max: 99 % 96 %       Recent Labs   Lab 04/05/24  0501 04/07/24  0324 04/09/24  0616   WBC 6.21 6.64 10.83   RBC 3.10* 3.03* 3.07*   HGB 9.4* 9.3* 9.2*   HCT 30.5* 29.5* 30.1*   MCV 98.4* 97.4* 98.0*   MCH 30.3 30.7 30.0   MCHC 30.8* 31.5* 30.6*   RDW 16.8 16.9 16.9    201 164   MPV 11.5* 11.0* 10.0       Recent Labs   Lab 04/05/24  0501 04/07/24  0324 04/09/24  0616 04/09/24  0807    140 140  --    K 4.0 4.4 3.7  --    CO2 30 30 29  --    BUN 30.9* 39.4* 29.9*  --    CREATININE 0.85 0.85 0.81  --    CALCIUM 8.6* 8.8 8.9  --    PH  --   --   --  7.520*          Microbiology Results (last 7 days)       Procedure Component Value Units Date/Time    Urine culture [[id number]] Collected: 04/07/24 2023    Order  Status: Completed Specimen: Urine Updated: 04/09/24 0648     Urine Culture No Growth             Radiology:  X-Ray Chest 1 View  Narrative: EXAMINATION:  XR CHEST 1 VIEW    CPT 15734    CLINICAL HISTORY:  dyspnea;    COMPARISON:  April 8, 2024    FINDINGS:  Examination reveals cardiomediastinal silhouette to be unchanged as compared with the previous exam.    Worsening changes of increase in interstitial and pulmonary vascular markings which may indicate the presence of pulmonary vascular congestion and cardiac decompensation.    There is blunting of the costophrenic angles representing small pleural effusions.    No focal consolidative changes  Impression: Increase in interstitial and pulmonary vascular markings worsened as compared with the last exam.    Changes suggestive of bilateral pleural effusions    Electronically signed by: Lito Hurst  Date:    04/09/2024  Time:    08:22      Scheduled Med:   albuterol-ipratropium  3 mL Nebulization Q4H    aspirin  81 mg Oral Daily    atorvastatin  80 mg Oral Daily    chlorhexidine  15 mL Mouth/Throat BID    folic acid  1 mg Oral Daily    furosemide (LASIX) injection  40 mg Intravenous Q8H    metoprolol succinate  12.5 mg Oral Daily    midodrine  10 mg Oral TID WM    multivitamin  1 tablet Oral Daily    pantoprazole  40 mg Intravenous BID    phenazopyridine  100 mg Oral TID WM    predniSONE  10 mg Oral Daily    rivaroxaban  20 mg Oral Daily with dinner        Continuous Infusions:       PRN Meds:  0.9%  NaCl infusion (for blood administration), 0.9%  NaCl infusion (for blood administration), acetaminophen, ALPRAZolam, dextrose 10 % in water (D10W), dextrose 10 % in water (D10W), dextrose 10%, dextrose 10%, hydrALAZINE, insulin aspart U-100, ipratropium, LORazepam, melatonin, morphine, ondansetron, oxyCODONE, sodium chloride 0.9%, sodium chloride 0.9%       Assessment/Plan:   Scrotal abscess, with component of early Claudine's, s/p exploration, I&D, and left  orchiectomy 03/18.  Severe sepsis from above, resolving  Questionable RLL post-viral Pneumonia, Human Metapneumovirus positive, resolving  Acute hypoxic respiratory failure from above  Cardiac arrest with ventricular tachycardia requiring defibrillation x3 (03/18/2024)  Acute on chronic diastolic heart failure  AFib RVR  NSTMI, unspecified  Partial bowel obstruction  Normocytic anemia.    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  Will continue with IV diuretics for now.  Potentially can switch back over to oral tomorrow.    Continue with IV Lasix t.i.d..  Will repeat labs tomorrow morning.    Wean O2 as tolerated.  Goal to get him back to 1-2 L nasal cannula.  Continue Xarelto, aspirin, and statin per Neurology recommendations.    Okay to continue with PT and OT.    He was also on a steroid taper.  Plan for 10 mg for another 4 days.  Home health has been arranged.  Suspect he will be able to be discharged in the next 24-48 hours.    Critical care diagnosis: acute systolic heart failure requiring IV diuretics  Critical care interventions: hands on evaluation, review of labs/radiographs/records and discussions with family  Critical care time spent: >32 minutes          Tha Edmond MD   04/10/2024     All diagnosis and differential diagnosis have been reviewed; assessment and plan has been documented; I have personally reviewed the labs and test results that are presently available; I have reviewed the patients medication list; I have reviewed the consulting providers response and recommendations. I have reviewed or attempted to review medical records based upon their availability    All of the patient's questions have been  addressed and answered. Patient's is agreeable to the above stated plan. I will continue to  monitor closely and make adjustments to medical management as needed.  _____________________________________________________________________

## 2024-04-10 NOTE — NURSING
[facility] 4th Floor Medical Telemetry  Wound Care    Patient Name:  Ran Reyes Jr.   MRN:  [id number]  Date: 4/10/2024  Diagnosis: Scrotal hematoma    History:     Past Medical History:   Diagnosis Date    A-fib     Anticoagulant long-term use     Aortic aneurysm     Cataract     CHF (congestive heart failure)     Chronic atrial fibrillation     COPD (chronic obstructive pulmonary disease)     Coronary artery disease     HLD (hyperlipidemia)     Hypertension     Mitral regurgitation     PAD (peripheral artery disease)     Primary open angle glaucoma (POAG)        Social History     Socioeconomic History    Marital status: Single   Tobacco Use    Smoking status: Every Day     Current packs/day: 0.25     Average packs/day: 0.3 packs/day for 40.0 years (10.0 ttl pk-yrs)     Types: Cigarettes     Passive exposure: Current    Smokeless tobacco: Never   Substance and Sexual Activity    Alcohol use: Yes     Alcohol/week: 3.0 standard drinks of alcohol     Types: 3 Cans of beer per week     Comment: socially    Drug use: Yes     Frequency: 1.0 times per week     Types: Marijuana     Comment: no use in over 2 months     Social Determinants of Health     Financial Resource Strain: Patient Unable To Answer (3/19/2024)    Overall Financial Resource Strain (CARDIA)     Difficulty of Paying Living Expenses: Patient unable to answer   Food Insecurity: Patient Unable To Answer (3/19/2024)    Hunger Vital Sign     Worried About Running Out of Food in the Last Year: Patient unable to answer     Ran Out of Food in the Last Year: Patient unable to answer   Transportation Needs: Patient Unable To Answer (3/19/2024)    PRAPARE - Transportation     Lack of Transportation (Medical): Patient unable to answer     Lack of Transportation (Non-Medical): Patient unable to answer   Physical Activity: Inactive (11/30/2022)    Exercise Vital Sign     Days of Exercise per Week: 0 days     Minutes of Exercise per Session: 0 min    Stress: Patient Unable To Answer (3/19/2024)    [employer] [place] [employer] - Occupational Stress Questionnaire     Feeling of Stress : Patient unable to answer   Social Connections: Socially Isolated (11/30/2022)    Social Connection and Isolation Panel [NHANES]     Frequency of Communication with Friends and Family: Never     Frequency of Social Gatherings with Friends and Family: Never     Attends Confucianist Services: More than 4 times per year     Active Member of Clubs or Organizations: No     Attends Club or Organization Meetings: Never     Marital Status: Never    Housing Stability: Patient Unable To Answer (3/19/2024)    Housing Stability Vital Sign     Unable to Pay for Housing in the Last Year: Patient unable to answer     Unstable Housing in the Last Year: Patient unable to answer       Precautions:     Allergies as of 03/17/2024    (No Known Allergies)       [facility] Assessment Details/Treatment      04/10/24 0900        Incision/Site 03/18/24 1649 Left Scrotum lateral vertical   Date First Assessed/Time First Assessed: 03/18/24 1649   Side: Left  Location: Scrotum  Orientation: lateral  Incision Type: vertical  Closure Method: Other (see comments)  Additional Comments: left open - packed with dakins soaked kerlix, fluffs, ABD...   Wound Image    Dressing Appearance No dressing   Drainage Amount Moderate   Drainage Characteristics/Odor Serous;No odor   Appearance Pink;Moist   Red (%), Wound Tissue Color 100 %   Periwound Area Dry;Intact   Wound Edges Irregular   Wound Length (cm) 8 cm   Wound Width (cm) 5.5 cm   Wound Depth (cm) 1 cm   Wound Volume (cm^3) 44 cm^3   Wound Surface Area (cm^2) 44 cm^2   Undermining (depth (cm)/location) 3-6  .6cm   Care Cleansed with:;Antimicrobial agent   Dressing Gauze, wet to dry  (with dakins sol)   Safety   Safety Precautions emergency equipment at bedside   Safety Management   Safety Promotion/Fall Prevention assistive device/personal item within reach    Patient Rounds bed in low position;bed wheels locked;call light in patient/parent reach   Daily Care   Activity Assistance Provided independent   Positioning   Body Position position changed independently   Head of Bed (HOB) Positioning HOB at 30 degrees   Positioning/Transfer Devices pillows;in use   Hygiene Care   Bathing/Skin Care patient refused     Re-eval of left scrotal abscess wd.  Pt just returned from bathroom.  Bed and floor cleaned up .  Instructed on calling staff for assistance if needed .    Dressing to scrotum and underwear removed  and replaced after cleaning with dakins.    Instructions to pt on his care and maintaining more cleanliness and asking for assistance as needed.   His wound is pink and moist but has a way to go.  No changes to care at this time.    Will follow more closely every few days for now.  Spoke with nurse Freeman.       04/10/2024

## 2024-04-11 LAB
ANION GAP SERPL CALC-SCNC: 11 MEQ/L
BASOPHILS # BLD AUTO: 0.01 X10(3)/MCL
BASOPHILS NFR BLD AUTO: 0.1 %
BUN SERPL-MCNC: 25.7 MG/DL (ref 8.4–25.7)
CALCIUM SERPL-MCNC: 8.6 MG/DL (ref 8.8–10)
CHLORIDE SERPL-SCNC: 103 MMOL/L (ref 98–107)
CO2 SERPL-SCNC: 28 MMOL/L (ref 23–31)
CREAT SERPL-MCNC: 1.11 MG/DL (ref 0.73–1.18)
CREAT/UREA NIT SERPL: 23
EOSINOPHIL # BLD AUTO: 0.12 X10(3)/MCL (ref 0–0.9)
EOSINOPHIL NFR BLD AUTO: 1.2 %
ERYTHROCYTE [DISTWIDTH] IN BLOOD BY AUTOMATED COUNT: 17.3 % (ref 11.5–17)
GFR SERPLBLD CREATININE-BSD FMLA CKD-EPI: >60 MLS/MIN/1.73/M2
GLUCOSE SERPL-MCNC: 96 MG/DL (ref 82–115)
HCT VFR BLD AUTO: 28.4 % (ref 42–52)
HGB BLD-MCNC: 8.5 G/DL (ref 14–18)
IMM GRANULOCYTES # BLD AUTO: 0.06 X10(3)/MCL (ref 0–0.04)
IMM GRANULOCYTES NFR BLD AUTO: 0.6 %
LYMPHOCYTES # BLD AUTO: 0.79 X10(3)/MCL (ref 0.6–4.6)
LYMPHOCYTES NFR BLD AUTO: 7.6 %
MCH RBC QN AUTO: 29.8 PG (ref 27–31)
MCHC RBC AUTO-ENTMCNC: 29.9 G/DL (ref 33–36)
MCV RBC AUTO: 99.6 FL (ref 80–94)
MONOCYTES # BLD AUTO: 0.63 X10(3)/MCL (ref 0.1–1.3)
MONOCYTES NFR BLD AUTO: 6.1 %
NEUTROPHILS # BLD AUTO: 8.76 X10(3)/MCL (ref 2.1–9.2)
NEUTROPHILS NFR BLD AUTO: 84.4 %
NRBC BLD AUTO-RTO: 0 %
PLATELET # BLD AUTO: 177 X10(3)/MCL (ref 130–400)
PMV BLD AUTO: 10.6 FL (ref 7.4–10.4)
POTASSIUM SERPL-SCNC: 3.4 MMOL/L (ref 3.5–5.1)
RBC # BLD AUTO: 2.85 X10(6)/MCL (ref 4.7–6.1)
SODIUM SERPL-SCNC: 142 MMOL/L (ref 136–145)
TRIGL SERPL-MCNC: 58 MG/DL (ref 34–140)
WBC # SPEC AUTO: 10.37 X10(3)/MCL (ref 4.5–11.5)

## 2024-04-11 PROCEDURE — 84478 ASSAY OF TRIGLYCERIDES: CPT

## 2024-04-11 PROCEDURE — 99900031 HC PATIENT EDUCATION (STAT)

## 2024-04-11 PROCEDURE — 99900035 HC TECH TIME PER 15 MIN (STAT)

## 2024-04-11 PROCEDURE — C9113 INJ PANTOPRAZOLE SODIUM, VIA: HCPCS | Performed by: INTERNAL MEDICINE

## 2024-04-11 PROCEDURE — 25000003 PHARM REV CODE 250: Performed by: STUDENT IN AN ORGANIZED HEALTH CARE EDUCATION/TRAINING PROGRAM

## 2024-04-11 PROCEDURE — 25000003 PHARM REV CODE 250: Performed by: INTERNAL MEDICINE

## 2024-04-11 PROCEDURE — 94640 AIRWAY INHALATION TREATMENT: CPT

## 2024-04-11 PROCEDURE — 85025 COMPLETE CBC W/AUTO DIFF WBC: CPT | Performed by: INTERNAL MEDICINE

## 2024-04-11 PROCEDURE — 63600175 PHARM REV CODE 636 W HCPCS: Performed by: INTERNAL MEDICINE

## 2024-04-11 PROCEDURE — 27000221 HC OXYGEN, UP TO 24 HOURS

## 2024-04-11 PROCEDURE — 21400001 HC TELEMETRY ROOM

## 2024-04-11 PROCEDURE — 94760 N-INVAS EAR/PLS OXIMETRY 1: CPT

## 2024-04-11 PROCEDURE — 25000242 PHARM REV CODE 250 ALT 637 W/ HCPCS: Performed by: INTERNAL MEDICINE

## 2024-04-11 PROCEDURE — 63600175 PHARM REV CODE 636 W HCPCS: Performed by: HOSPITALIST

## 2024-04-11 PROCEDURE — 25000003 PHARM REV CODE 250

## 2024-04-11 PROCEDURE — 80048 BASIC METABOLIC PNL TOTAL CA: CPT | Performed by: INTERNAL MEDICINE

## 2024-04-11 RX ORDER — PANTOPRAZOLE SODIUM 40 MG/1
40 TABLET, DELAYED RELEASE ORAL 2 TIMES DAILY
Status: DISCONTINUED | OUTPATIENT
Start: 2024-04-11 | End: 2024-04-12 | Stop reason: HOSPADM

## 2024-04-11 RX ORDER — FUROSEMIDE 10 MG/ML
40 INJECTION INTRAMUSCULAR; INTRAVENOUS EVERY 12 HOURS
Status: DISCONTINUED | OUTPATIENT
Start: 2024-04-11 | End: 2024-04-12 | Stop reason: HOSPADM

## 2024-04-11 RX ADMIN — PHENAZOPYRIDINE HYDROCHLORIDE 100 MG: 100 TABLET ORAL at 11:04

## 2024-04-11 RX ADMIN — IPRATROPIUM BROMIDE AND ALBUTEROL SULFATE 3 ML: 2.5; .5 SOLUTION RESPIRATORY (INHALATION) at 04:04

## 2024-04-11 RX ADMIN — MIDODRINE HYDROCHLORIDE 10 MG: 5 TABLET ORAL at 11:04

## 2024-04-11 RX ADMIN — IPRATROPIUM BROMIDE AND ALBUTEROL SULFATE 3 ML: 2.5; .5 SOLUTION RESPIRATORY (INHALATION) at 08:04

## 2024-04-11 RX ADMIN — PANTOPRAZOLE SODIUM 40 MG: 40 TABLET, DELAYED RELEASE ORAL at 08:04

## 2024-04-11 RX ADMIN — ATORVASTATIN CALCIUM 80 MG: 40 TABLET, FILM COATED ORAL at 09:04

## 2024-04-11 RX ADMIN — PHENAZOPYRIDINE HYDROCHLORIDE 100 MG: 100 TABLET ORAL at 04:04

## 2024-04-11 RX ADMIN — CHLORHEXIDINE GLUCONATE 0.12% ORAL RINSE 15 ML: 1.2 LIQUID ORAL at 08:04

## 2024-04-11 RX ADMIN — PREDNISONE 10 MG: 10 TABLET ORAL at 09:04

## 2024-04-11 RX ADMIN — PHENAZOPYRIDINE HYDROCHLORIDE 100 MG: 100 TABLET ORAL at 07:04

## 2024-04-11 RX ADMIN — CHLORHEXIDINE GLUCONATE 0.12% ORAL RINSE 15 ML: 1.2 LIQUID ORAL at 09:04

## 2024-04-11 RX ADMIN — FUROSEMIDE 40 MG: 10 INJECTION, SOLUTION INTRAMUSCULAR; INTRAVENOUS at 08:04

## 2024-04-11 RX ADMIN — IPRATROPIUM BROMIDE AND ALBUTEROL SULFATE 3 ML: 2.5; .5 SOLUTION RESPIRATORY (INHALATION) at 11:04

## 2024-04-11 RX ADMIN — FOLIC ACID 1 MG: 1 TABLET ORAL at 09:04

## 2024-04-11 RX ADMIN — METOPROLOL SUCCINATE 12.5 MG: 25 TABLET, EXTENDED RELEASE ORAL at 09:04

## 2024-04-11 RX ADMIN — IPRATROPIUM BROMIDE AND ALBUTEROL SULFATE 3 ML: 2.5; .5 SOLUTION RESPIRATORY (INHALATION) at 07:04

## 2024-04-11 RX ADMIN — IPRATROPIUM BROMIDE AND ALBUTEROL SULFATE 3 ML: 2.5; .5 SOLUTION RESPIRATORY (INHALATION) at 05:04

## 2024-04-11 RX ADMIN — POTASSIUM BICARBONATE 50 MEQ: 977.5 TABLET, EFFERVESCENT ORAL at 05:04

## 2024-04-11 RX ADMIN — MIDODRINE HYDROCHLORIDE 10 MG: 5 TABLET ORAL at 04:04

## 2024-04-11 RX ADMIN — THERA TABS 1 TABLET: TAB at 09:04

## 2024-04-11 RX ADMIN — FUROSEMIDE 40 MG: 10 INJECTION, SOLUTION INTRAMUSCULAR; INTRAVENOUS at 12:04

## 2024-04-11 RX ADMIN — FUROSEMIDE 40 MG: 10 INJECTION, SOLUTION INTRAMUSCULAR; INTRAVENOUS at 07:04

## 2024-04-11 RX ADMIN — ASPIRIN 81 MG: 81 TABLET, COATED ORAL at 09:04

## 2024-04-11 RX ADMIN — PANTOPRAZOLE SODIUM 40 MG: 40 INJECTION, POWDER, FOR SOLUTION INTRAVENOUS at 09:04

## 2024-04-11 RX ADMIN — RIVAROXABAN 20 MG: 10 TABLET, FILM COATED ORAL at 04:04

## 2024-04-11 RX ADMIN — IPRATROPIUM BROMIDE AND ALBUTEROL SULFATE 3 ML: 2.5; .5 SOLUTION RESPIRATORY (INHALATION) at 01:04

## 2024-04-11 RX ADMIN — IPRATROPIUM BROMIDE AND ALBUTEROL SULFATE 3 ML: 2.5; .5 SOLUTION RESPIRATORY (INHALATION) at 12:04

## 2024-04-11 RX ADMIN — MIDODRINE HYDROCHLORIDE 10 MG: 5 TABLET ORAL at 07:04

## 2024-04-11 NOTE — PROGRESS NOTES
[facility] Hospital Medicine Progress Note        Chief Complaint: Inpatient Follow-up     HPI:   68-year-old male with a history that includes VHD, nonischemic cardiomyopathy (with an EF of 41%),  chronic AFib on Xarelto, peripheral arterial disease, COPD, and a large left testicle hydrocele, presented to the ED 3/17 complaining of what he thought was rectal bleeding as he was noting blood in the toilet and running down his legs.  Ultimately was found that the bleeding was coming from the posterior aspect of his scrotum from a superficial scrotal ulceration.  He also complained of persistent testicular swelling and pain.  Doppler ultrasound was significantly limited, but could not rule out the presence of torsion.  Urology was consulted and they made decision to proceed with emergent scrotal exploration.  While still in PACU, patient was hypotensive with blood persistently 80s/50s, this despite fluid resuscitation.   Patient subsequently transferred to the ICU for vasopressor support.  Additionally developed cardiac arrest with ventricular tachycardia requiring defibrillation x3 (03/18/2024), with ROSC.   He was continued on IV antibiotics, and eventually weaned off pressors.  Additionally FOB noted to be positive.  GI consulted and planned for EGD.  Cardiology involved as well, with plans for Ischemic evaluation.  Cleared for AC and started on FD lovenox.  Underwent EGD 3/22 noting grade B reflux esophagitis, chronic gastritis, chronic duodenitis.  GI considering outpatient colonoscopy, recommended Carafate for 7 days and continued PPI BID for a month on discharge and then daily for 3 months.  Cardiology on board, awaiting ischemic evaluation.  Urology continued to follow.  Patient continued on IV antibiotics; working with therapy.  On  evening of 3/28 he developed worsening hypoxemia.  CXR noted bilateral scattered opacities, and he was requiring BiPAP for oxygenation.  Antibiotics  broadened, and he was initiated on IV Lasix.  He subsequently diuresed well (a negative fluid balance >5L over 48hrs).  Subsequently transitioned off BiPAP back to supplemental oxygen, and remained stable.      Interval Hx:  Patient seen and examined this morning on 3 L of nasal cannula saturating around 90-95%    Objective/physical exam:  General: In no acute distress, afebrile  Chest:  Coarse bilaterally  Heart: RRR, +S1, S2, no appreciable murmur  Abdomen: Soft, nontender, BS +  MSK: Warm, no lower extremity edema, no clubbing or cyanosis  Neurologic: Alert and oriented x4,   VITAL SIGNS: 24 HRS MIN & MAX LAST   Temp  Min: 97.5 °F (36.4 °C)  Max: 98.3 °F (36.8 °C) 97.5 °F (36.4 °C)   BP  Min: 96/65  Max: 130/81 104/67   Pulse  Min: 60  Max: 84  79   Resp  Min: 18  Max: 21 (!) 21   SpO2  Min: 90 %  Max: 96 % (!) 93 %       Recent Labs   Lab 04/05/24  0501 04/07/24 0324 04/09/24  0616   WBC 6.21 6.64 10.83   RBC 3.10* 3.03* 3.07*   HGB 9.4* 9.3* 9.2*   HCT 30.5* 29.5* 30.1*   MCV 98.4* 97.4* 98.0*   MCH 30.3 30.7 30.0   MCHC 30.8* 31.5* 30.6*   RDW 16.8 16.9 16.9    201 164   MPV 11.5* 11.0* 10.0         Recent Labs   Lab 04/05/24  0501 04/07/24 0324 04/09/24  0616 04/09/24  0807    140 140  --    K 4.0 4.4 3.7  --    CO2 30 30 29  --    BUN 30.9* 39.4* 29.9*  --    CREATININE 0.85 0.85 0.81  --    CALCIUM 8.6* 8.8 8.9  --    PH  --   --   --  7.520*            Microbiology Results (last 7 days)       Procedure Component Value Units Date/Time    Urine culture [[id number]] Collected: 04/07/24 2023    Order Status: Completed Specimen: Urine Updated: 04/09/24 0648     Urine Culture No Growth             Radiology:  X-Ray Chest 1 View  Narrative: EXAMINATION:  XR CHEST 1 VIEW    CPT 81966    CLINICAL HISTORY:  dyspnea;    COMPARISON:  April 8, 2024    FINDINGS:  Examination reveals cardiomediastinal silhouette to be unchanged as compared with the previous exam.    Worsening changes of increase in  interstitial and pulmonary vascular markings which may indicate the presence of pulmonary vascular congestion and cardiac decompensation.    There is blunting of the costophrenic angles representing small pleural effusions.    No focal consolidative changes  Impression: Increase in interstitial and pulmonary vascular markings worsened as compared with the last exam.    Changes suggestive of bilateral pleural effusions    Electronically signed by: Lito Hurst  Date:    04/09/2024  Time:    08:22      Scheduled Med:   albuterol-ipratropium  3 mL Nebulization Q4H    aspirin  81 mg Oral Daily    atorvastatin  80 mg Oral Daily    chlorhexidine  15 mL Mouth/Throat BID    folic acid  1 mg Oral Daily    furosemide (LASIX) injection  40 mg Intravenous Q8H    metoprolol succinate  12.5 mg Oral Daily    midodrine  10 mg Oral TID WM    multivitamin  1 tablet Oral Daily    pantoprazole  40 mg Intravenous BID    phenazopyridine  100 mg Oral TID WM    predniSONE  10 mg Oral Daily    rivaroxaban  20 mg Oral Daily with dinner        Continuous Infusions:       PRN Meds:  0.9%  NaCl infusion (for blood administration), 0.9%  NaCl infusion (for blood administration), acetaminophen, ALPRAZolam, dextrose 10 % in water (D10W), dextrose 10 % in water (D10W), dextrose 10%, dextrose 10%, hydrALAZINE, insulin aspart U-100, ipratropium, LORazepam, melatonin, morphine, ondansetron, oxyCODONE, sodium chloride 0.9%, sodium chloride 0.9%       Assessment/Plan:   Scrotal abscess, with component of early Claudine's, s/p exploration, I&D, and left orchiectomy 03/18.  Severe sepsis from above, resolved  Questionable RLL post-viral Pneumonia, Human Metapneumovirus positive, resolving  Acute hypoxic respiratory failure from above  Cardiac arrest with ventricular tachycardia requiring defibrillation x3 (03/18/2024)  Acute on chronic diastolic heart failure  Chronic AFib  Nonischemic cardiomyopathy with recovered EF   Nonobstructive coronary artery  disease   Valvular heart disease moderate MR  Infrarenal abdominal aortic aneurysm dilation with mural thrombus  NSTMI, unspecified  Partial bowel obstruction  Normocytic anemia.    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  Mild ELIZABETH, resolved       Patient completed antibiotics for scrotal abscess for total of 2 weeks on April 2nd  Symptomatically the patient was improving though still on 3 L nasal cannula.   Will repeat chest x-ray again today  Reduce the dose of Lasix to 40 b.i.d. continue with DuoNeb   Continue with other home medications that includes aspirin, statin, folic acid, phenazopyridine  Patient continues to be on prednisone taper  Will reconsult PT and OT as patient reports he has not gotten up from the bed in the past few days  Prophylaxis: Xarelto     Discharge disposition once we can wean him off the oxygen F and if he is doing well with physical therapy      Giana Garcia MD   04/11/2024     All diagnosis and differential diagnosis have been reviewed; assessment and plan has been documented; I have personally reviewed the labs and test results that are presently available; I have reviewed the patients medication list; I have reviewed the consulting providers response and recommendations. I have reviewed or attempted to review medical records based upon their availability    All of the patient's questions have been  addressed and answered. Patient's is agreeable to the above stated plan. I will continue to monitor closely and make adjustments to medical management as needed.  _____________________________________________________________________

## 2024-04-11 NOTE — PLAN OF CARE
Problem: Adult Inpatient Plan of Care  Goal: Plan of Care Review  4/11/2024 1213 by Lydia Hernandez RN  Outcome: Ongoing, Progressing  4/11/2024 1213 by Lydia Hernandez RN  Outcome: Ongoing, Progressing  Goal: Patient-Specific Goal (Individualized)  4/11/2024 1213 by Lydia Hernandez RN  Outcome: Ongoing, Progressing  4/11/2024 1213 by Lydia Hernandez RN  Outcome: Ongoing, Progressing  Goal: Absence of Hospital-Acquired Illness or Injury  4/11/2024 1213 by Lydia Hernandez RN  Outcome: Ongoing, Progressing  4/11/2024 1213 by Lydia Hernandez RN  Outcome: Ongoing, Progressing  Goal: Optimal Comfort and Wellbeing  4/11/2024 1213 by Lydia Hernandez RN  Outcome: Ongoing, Progressing  4/11/2024 1213 by Lydia Hernandez RN  Outcome: Ongoing, Progressing  Goal: Readiness for Transition of Care  Outcome: Ongoing, Progressing     Problem: Skin Injury Risk Increased  Goal: Skin Health and Integrity  4/11/2024 1213 by Lydia Hernandez RN  Outcome: Ongoing, Progressing  4/11/2024 1213 by Lydia Hernandez RN  Outcome: Ongoing, Progressing

## 2024-04-11 NOTE — PROGRESS NOTES
Pharmacist Intervention IV to PO Note    Ran Reyes Jr. is a 68 y.o. male being treated with IV medication pantoprazole    Patient Data:    Vital Signs (Most Recent):  Temp: 97.5 °F (36.4 °C) (04/11/24 0727)  Pulse: 79 (04/11/24 0939)  Resp: (!) 21 (04/11/24 0857)  BP: 104/67 (04/11/24 0939)  SpO2: (!) 93 % (04/11/24 0857) Vital Signs (72h Range):  Temp:  [97.5 °F (36.4 °C)-99.4 °F (37.4 °C)]   Pulse:  []   Resp:  [16-30]   BP: ()/(61-90)   SpO2:  [75 %-99 %]      CBC:  Recent Labs   Lab 04/05/24  0501 04/07/24  0324 04/09/24  0616   WBC 6.21 6.64 10.83   RBC 3.10* 3.03* 3.07*   HGB 9.4* 9.3* 9.2*   HCT 30.5* 29.5* 30.1*    201 164   MCV 98.4* 97.4* 98.0*   MCH 30.3 30.7 30.0   MCHC 30.8* 31.5* 30.6*     CMP:     Recent Labs   Lab 04/05/24  0501 04/07/24  0324 04/09/24  0616   CALCIUM 8.6* 8.8 8.9    140 140   K 4.0 4.4 3.7   CO2 30 30 29   BUN 30.9* 39.4* 29.9*   CREATININE 0.85 0.85 0.81       Dietary Orders:  Diet Orders            Dietary nutrition supplements Ge - Fruit Punch; BID starting at 04/03 1554    Dietary nutrition supplements Boost Plus Nutritional Drink - Rich Chocolate; TID starting at 04/03 1554    Diet Heart Healthy: Heart Healthy starting at 03/26 1813            Based on the following criteria, this patient qualifies for intravenous to oral conversion:  [x] The patients gastrointestinal tract is functioning (tolerating medications via oral or enteral route for 24 hours and tolerating food or enteral feeds for 24 hours).  [x] The patient is hemodynamically stable for 24 hours (heart rate <100 beats per minute, systolic blood pressure >99 mm Hg, and respiratory rate <20 breaths per minute).  [x] The patient shows clinical improvement (afebrile for at least 24 hours and white blood cell count downtrending or normalized). Additionally, the patient must be non-neutropenic (absolute neutrophil count >500 cells/mm3).  [x] For antimicrobials, the patient has received  IV therapy for at least 24 hours.    IV medication pantoprazole 40 mg BID will be changed to oral medication pantoprazole 40  mg BID    Pharmacist's Name: Tiffanie Wood  Pharmacist's Extension: [phone]

## 2024-04-12 VITALS
RESPIRATION RATE: 21 BRPM | OXYGEN SATURATION: 96 % | TEMPERATURE: 98 F | BODY MASS INDEX: 28.42 KG/M2 | HEART RATE: 75 BPM | HEIGHT: 71 IN | WEIGHT: 203 LBS | DIASTOLIC BLOOD PRESSURE: 65 MMHG | SYSTOLIC BLOOD PRESSURE: 104 MMHG

## 2024-04-12 LAB
ALBUMIN SERPL-MCNC: 3 G/DL (ref 3.4–4.8)
ALBUMIN/GLOB SERPL: 0.9 RATIO (ref 1.1–2)
ALP SERPL-CCNC: 77 UNIT/L (ref 40–150)
ALT SERPL-CCNC: 17 UNIT/L (ref 0–55)
AST SERPL-CCNC: 21 UNIT/L (ref 5–34)
BASOPHILS # BLD AUTO: 0.01 X10(3)/MCL
BASOPHILS NFR BLD AUTO: 0.1 %
BILIRUB SERPL-MCNC: 1.4 MG/DL
BUN SERPL-MCNC: 32.3 MG/DL (ref 8.4–25.7)
CALCIUM SERPL-MCNC: 8.9 MG/DL (ref 8.8–10)
CHLORIDE SERPL-SCNC: 99 MMOL/L (ref 98–107)
CO2 SERPL-SCNC: 31 MMOL/L (ref 23–31)
CREAT SERPL-MCNC: 0.93 MG/DL (ref 0.73–1.18)
EOSINOPHIL # BLD AUTO: 0.12 X10(3)/MCL (ref 0–0.9)
EOSINOPHIL NFR BLD AUTO: 1.4 %
ERYTHROCYTE [DISTWIDTH] IN BLOOD BY AUTOMATED COUNT: 17.2 % (ref 11.5–17)
GFR SERPLBLD CREATININE-BSD FMLA CKD-EPI: >60 MLS/MIN/1.73/M2
GLOBULIN SER-MCNC: 3.2 GM/DL (ref 2.4–3.5)
GLUCOSE SERPL-MCNC: 94 MG/DL (ref 82–115)
HCT VFR BLD AUTO: 26.2 % (ref 42–52)
HGB BLD-MCNC: 8.1 G/DL (ref 14–18)
IMM GRANULOCYTES # BLD AUTO: 0.05 X10(3)/MCL (ref 0–0.04)
IMM GRANULOCYTES NFR BLD AUTO: 0.6 %
LYMPHOCYTES # BLD AUTO: 1.89 X10(3)/MCL (ref 0.6–4.6)
LYMPHOCYTES NFR BLD AUTO: 21.3 %
MCH RBC QN AUTO: 30 PG (ref 27–31)
MCHC RBC AUTO-ENTMCNC: 30.9 G/DL (ref 33–36)
MCV RBC AUTO: 97 FL (ref 80–94)
MONOCYTES # BLD AUTO: 0.77 X10(3)/MCL (ref 0.1–1.3)
MONOCYTES NFR BLD AUTO: 8.7 %
NEUTROPHILS # BLD AUTO: 6.03 X10(3)/MCL (ref 2.1–9.2)
NEUTROPHILS NFR BLD AUTO: 67.9 %
NRBC BLD AUTO-RTO: 0 %
PLATELET # BLD AUTO: 174 X10(3)/MCL (ref 130–400)
PMV BLD AUTO: 10.9 FL (ref 7.4–10.4)
POTASSIUM SERPL-SCNC: 3.8 MMOL/L (ref 3.5–5.1)
PROT SERPL-MCNC: 6.2 GM/DL (ref 5.8–7.6)
RBC # BLD AUTO: 2.7 X10(6)/MCL (ref 4.7–6.1)
SODIUM SERPL-SCNC: 141 MMOL/L (ref 136–145)
TRIGL SERPL-MCNC: 46 MG/DL (ref 34–140)
WBC # SPEC AUTO: 8.87 X10(3)/MCL (ref 4.5–11.5)

## 2024-04-12 PROCEDURE — 25000242 PHARM REV CODE 250 ALT 637 W/ HCPCS: Performed by: INTERNAL MEDICINE

## 2024-04-12 PROCEDURE — 25000003 PHARM REV CODE 250

## 2024-04-12 PROCEDURE — 25000003 PHARM REV CODE 250: Performed by: STUDENT IN AN ORGANIZED HEALTH CARE EDUCATION/TRAINING PROGRAM

## 2024-04-12 PROCEDURE — 85025 COMPLETE CBC W/AUTO DIFF WBC: CPT | Performed by: INTERNAL MEDICINE

## 2024-04-12 PROCEDURE — 63600175 PHARM REV CODE 636 W HCPCS: Performed by: INTERNAL MEDICINE

## 2024-04-12 PROCEDURE — 63600175 PHARM REV CODE 636 W HCPCS: Performed by: HOSPITALIST

## 2024-04-12 PROCEDURE — 80053 COMPREHEN METABOLIC PANEL: CPT | Performed by: INTERNAL MEDICINE

## 2024-04-12 PROCEDURE — 25000003 PHARM REV CODE 250: Performed by: INTERNAL MEDICINE

## 2024-04-12 PROCEDURE — 27000221 HC OXYGEN, UP TO 24 HOURS

## 2024-04-12 PROCEDURE — 94760 N-INVAS EAR/PLS OXIMETRY 1: CPT

## 2024-04-12 PROCEDURE — 97164 PT RE-EVAL EST PLAN CARE: CPT

## 2024-04-12 PROCEDURE — 84478 ASSAY OF TRIGLYCERIDES: CPT

## 2024-04-12 PROCEDURE — 99900031 HC PATIENT EDUCATION (STAT)

## 2024-04-12 PROCEDURE — 94640 AIRWAY INHALATION TREATMENT: CPT

## 2024-04-12 RX ORDER — PANTOPRAZOLE SODIUM 40 MG/1
40 TABLET, DELAYED RELEASE ORAL 2 TIMES DAILY
Qty: 60 TABLET | Refills: 0 | Status: SHIPPED | OUTPATIENT
Start: 2024-04-12 | End: 2024-05-12

## 2024-04-12 RX ADMIN — IPRATROPIUM BROMIDE AND ALBUTEROL SULFATE 3 ML: 2.5; .5 SOLUTION RESPIRATORY (INHALATION) at 12:04

## 2024-04-12 RX ADMIN — IPRATROPIUM BROMIDE AND ALBUTEROL SULFATE 3 ML: 2.5; .5 SOLUTION RESPIRATORY (INHALATION) at 04:04

## 2024-04-12 RX ADMIN — PANTOPRAZOLE SODIUM 40 MG: 40 TABLET, DELAYED RELEASE ORAL at 09:04

## 2024-04-12 RX ADMIN — ATORVASTATIN CALCIUM 80 MG: 40 TABLET, FILM COATED ORAL at 09:04

## 2024-04-12 RX ADMIN — FOLIC ACID 1 MG: 1 TABLET ORAL at 09:04

## 2024-04-12 RX ADMIN — IPRATROPIUM BROMIDE AND ALBUTEROL SULFATE 3 ML: 2.5; .5 SOLUTION RESPIRATORY (INHALATION) at 08:04

## 2024-04-12 RX ADMIN — PREDNISONE 10 MG: 10 TABLET ORAL at 09:04

## 2024-04-12 RX ADMIN — METOPROLOL SUCCINATE 12.5 MG: 25 TABLET, EXTENDED RELEASE ORAL at 09:04

## 2024-04-12 RX ADMIN — FUROSEMIDE 40 MG: 10 INJECTION, SOLUTION INTRAMUSCULAR; INTRAVENOUS at 09:04

## 2024-04-12 RX ADMIN — CHLORHEXIDINE GLUCONATE 0.12% ORAL RINSE 15 ML: 1.2 LIQUID ORAL at 09:04

## 2024-04-12 RX ADMIN — ASPIRIN 81 MG: 81 TABLET, COATED ORAL at 09:04

## 2024-04-12 RX ADMIN — THERA TABS 1 TABLET: TAB at 09:04

## 2024-04-12 RX ADMIN — PHENAZOPYRIDINE HYDROCHLORIDE 100 MG: 100 TABLET ORAL at 09:04

## 2024-04-12 RX ADMIN — MIDODRINE HYDROCHLORIDE 10 MG: 5 TABLET ORAL at 09:04

## 2024-04-12 NOTE — PLAN OF CARE
04/12/24 0900   Medicare Message   Important Message from Medicare regarding Discharge Appeal Rights Given to patient/caregiver;Explained to patient/caregiver

## 2024-04-12 NOTE — NURSING
O2 sat 87% on RA at Rest   Please follow up in 6 months. A prescription for Macrodantin has been sent to your pharmacy [facility].    Please continue vaginal estrogen 2 times weekly.

## 2024-04-12 NOTE — PT/OT/SLP RE-EVAL
Physical Therapy Re-Evaluation and Discharge    Patient Name:  Ran Reyes Jr.   MRN:  [id number]    Recommendations:     Discharge therapy intensity: No Therapy Indicated   Discharge Equipment Recommendations: none   Barriers to discharge: None    Assessment:     Ran Reyes Jr. is a 68 y.o. male admitted with a medical diagnosis of  testicular swelling and bleeding; L scrotal abscess; sepsis; UTI; s/p orchiectomy, joselito scrotal exploration, debridement; post op vfib arrest; Human Metapneumovirus. Asked to re-evaluate pt due to pt spending time in bed after having some respiratory distress earlier this week.      Today, pt presents completely independent with mobility and tolerated ambulating 300ft with independence. Pt voices he is ready for d/c home. SpO2 ranged from 88-92% while ambulating, although pt did not feel SOB. Notified RN that pt is allowed to ambulate in hallways independently. No acute PT need identified.     Rehab Prognosis: Good; patient would benefit from acute skilled PT services to address these deficits and reach maximum level of function.    Recent Surgery: Procedure(s) (LRB):  Angiogram, Coronary, with Left Heart Cath (N/A) 17 Days Post-Op    Plan:     During this hospitalization, patient to be seen 5 x/week to address the identified rehab impairments via gait training, therapeutic activities, therapeutic exercises and progress toward the following goals:    Plan of Care Expires:  04/21/24      Subjective     Chief Complaint: none  Patient/Family Comments/goals: to go home  Pain/Comfort:  Pain Rating 1: 0/10    Patients cultural, spiritual, Sabianism conflicts given the current situation: no    Objective:     Communicated with nurse prior to session.  Patient found supine with telemetry, pulse ox (continuous)  upon PT entry to room.    General Precautions: Standard, fall  Orthopedic Precautions:N/A   Braces: N/A  Respiratory Status: Room air      Exams:  Cognitive Exam:  Patient is  oriented to Person, Place, Time, and Situation  RLE ROM: WNL  RLE Strength: WNL  LLE ROM: WNL  LLE Strength: WNL  Skin integrity: Visible skin intact      Functional Mobility:  Bed Mobility:     Supine to Sit: independence  Sit to Supine: independence  Transfers:     Sit to Stand:  independence with no AD  Gait: 300ft with no AD, independently.       AM-PAC 6 CLICK MOBILITY  Total Score:24       Treatment & Education:    Patient provided with verbal education education regarding PT role/goals/POC and discharge/DME recommendations.  Understanding was verbalized.     Patient left supine with all lines intact and call button in reach.    GOALS:   Multidisciplinary Problems       Physical Therapy Goals          Problem: Physical Therapy    Goal Priority Disciplines Outcome Goal Variances Interventions   Physical Therapy Goal     PT, PT/OT Ongoing, Progressing     Description: Goals to be met by: 24     Patient will increase functional independence with mobility by performin. Supine to sit with MInimal Assistance  2. Sit to supine with MInimal Assistance  3. Sit to stand transfer with Stand-by Assistance  4. Gait  x 150 feet with Stand-by Assistance using Rolling Walker.                          History:     Past Medical History:   Diagnosis Date    A-fib     Anticoagulant long-term use     Aortic aneurysm     Cataract     CHF (congestive heart failure)     Chronic atrial fibrillation     COPD (chronic obstructive pulmonary disease)     Coronary artery disease     HLD (hyperlipidemia)     Hypertension     Mitral regurgitation     PAD (peripheral artery disease)     Primary open angle glaucoma (POAG)        Past Surgical History:   Procedure Laterality Date    ANGIOGRAM, CORONARY, WITH LEFT HEART CATHETERIZATION N/A 3/26/2024    Procedure: Angiogram, Coronary, with Left Heart Cath;  Surgeon: Adriano Montiel MD;  Location: [facility] CATH LAB;  Service: Cardiology;  Laterality: N/A;    ATHERECTOMY OF PERIPHERAL  VESSEL Left 09/12/2022    LEFT SFA ATHERECTOMY, BALLOON ANGIOPLASTY    CATARACT EXTRACTION W/  INTRAOCULAR LENS IMPLANT Left 3/9/2023    Procedure: EXTRACTION, CATARACT, WITH IOL INSERTION;  Surgeon: Georgi Borja MD;  Location: [facility];  Service: Ophthalmology;  Laterality: Left;  19.5  mac    EGD, WITH CLOSED BIOPSY  3/22/2024    Procedure: EGD, WITH CLOSED BIOPSY;  Surgeon: Ronald Calderon MD;  Location: [facility] ENDOSCOPY;  Service: Gastroenterology;;    ESOPHAGOGASTRODUODENOSCOPY N/A 3/22/2024    Procedure: EGD;  Surgeon: Ronald Calderon MD;  Location: [facility] ENDOSCOPY;  Service: Gastroenterology;  Laterality: N/A;    Heart Stent N/A     > 10yrs. AGO    INCISION AND DRAINAGE N/A 3/18/2024    Procedure: Incision and Drainage;  Surgeon: Fahad Rivas MD;  Location: [facility];  Service: Urology;  Laterality: N/A;  I&D SCROTAL ABSCESS    INSERTION OF STENT INTO PERIPHERAL VESSEL Right 10/17/2022    RIGHT SFA ATHERECTOMY, BALLOON ANGIOPLASTY, STENT; RIGHT ANTERIOR TIBIAL ARTERY ATHERECTOMY, BALLOON ANGIOPLASTY    ORCHIECTOMY Left 3/18/2024    Procedure: ORCHIECTOMY;  Surgeon: Fahad Rivas MD;  Location: [facility];  Service: Urology;  Laterality: Left;       Time Tracking:     PT Received On: 04/12/24  PT Start Time: 0936     PT Stop Time: 0947  PT Total Time (min): 11 min     Billable Minutes: Re-eval 11      04/12/2024

## 2024-04-12 NOTE — NURSING
[facility] 4th Floor Medical Telemetry  Wound Care    Patient Name:  Ran Reyes Jr.   MRN:  [id number]  Date: 4/12/2024  Diagnosis: Scrotal hematoma    History:     Past Medical History:   Diagnosis Date    A-fib     Anticoagulant long-term use     Aortic aneurysm     Cataract     CHF (congestive heart failure)     Chronic atrial fibrillation     COPD (chronic obstructive pulmonary disease)     Coronary artery disease     HLD (hyperlipidemia)     Hypertension     Mitral regurgitation     PAD (peripheral artery disease)     Primary open angle glaucoma (POAG)        Social History     Socioeconomic History    Marital status: Single   Tobacco Use    Smoking status: Every Day     Current packs/day: 0.25     Average packs/day: 0.3 packs/day for 40.0 years (10.0 ttl pk-yrs)     Types: Cigarettes     Passive exposure: Current    Smokeless tobacco: Never   Substance and Sexual Activity    Alcohol use: Yes     Alcohol/week: 3.0 standard drinks of alcohol     Types: 3 Cans of beer per week     Comment: socially    Drug use: Yes     Frequency: 1.0 times per week     Types: Marijuana     Comment: no use in over 2 months     Social Determinants of Health     Financial Resource Strain: Patient Unable To Answer (3/19/2024)    Overall Financial Resource Strain (CARDIA)     Difficulty of Paying Living Expenses: Patient unable to answer   Food Insecurity: Patient Unable To Answer (3/19/2024)    Hunger Vital Sign     Worried About Running Out of Food in the Last Year: Patient unable to answer     Ran Out of Food in the Last Year: Patient unable to answer   Transportation Needs: Patient Unable To Answer (3/19/2024)    PRAPARE - Transportation     Lack of Transportation (Medical): Patient unable to answer     Lack of Transportation (Non-Medical): Patient unable to answer   Physical Activity: Inactive (11/30/2022)    Exercise Vital Sign     Days of Exercise per Week: 0 days     Minutes of Exercise per Session: 0 min    Stress: Patient Unable To Answer (3/19/2024)    [employer] - Occupational Stress Questionnaire     Feeling of Stress : Patient unable to answer   Social Connections: Socially Isolated (11/30/2022)    Social Connection and Isolation Panel [NHANES]     Frequency of Communication with Friends and Family: Never     Frequency of Social Gatherings with Friends and Family: Never     Attends Restorationist Services: More than 4 times per year     Active Member of Clubs or Organizations: No     Attends Club or Organization Meetings: Never     Marital Status: Never    Housing Stability: Patient Unable To Answer (3/19/2024)    Housing Stability Vital Sign     Unable to Pay for Housing in the Last Year: Patient unable to answer     Unstable Housing in the Last Year: Patient unable to answer       Precautions:     Allergies as of 03/17/2024    (No Known Allergies)       [facility] Assessment Details/Treatment      04/12/24 0900        Incision/Site 03/18/24 1649 Left Scrotum lateral vertical   Date First Assessed/Time First Assessed: 03/18/24 1649   Side: Left  Location: Scrotum  Orientation: lateral  Incision Type: vertical  Closure Method: Other (see comments)  Additional Comments: left open - packed with dakins soaked kerlix, fluffs, ABD...   Wound Image    Dressing Appearance Intact;Moist drainage   Drainage Amount Small   Drainage Characteristics/Odor Serous;No odor   Appearance Pink;Red;Moist;Granulating   Red (%), Wound Tissue Color 100 %   Periwound Area Dry   Wound Edges Irregular   Wound Length (cm) 6 cm   Wound Width (cm) 4.5 cm   Wound Depth (cm) 0.8 cm   Wound Volume (cm^3) 21.6 cm^3   Wound Surface Area (cm^2) 27 cm^2   Undermining (depth (cm)/location) um 3-6 o ck ~.8cm   Care Cleansed with:;Antimicrobial agent   Dressing Changed;Gauze, wet to dry  (with dakins sol)   Safety Management   Safety Promotion/Fall Prevention assistive device/personal item within reach   Patient Rounds bed in  low position;bed wheels locked;call light in patient/parent reach;clutter free environment maintained;ID band on;placement of personal items at bedside;toileting offered;visualized patient   Positioning   Body Position position changed independently   Head of Bed (HOB) Positioning HOB at 20-30 degrees   Positioning/Transfer Devices pillows;in use     Re-eval of left scrotal wd.  See new photo. Continues to improve-smaller in size and less depth to sm undermine area.  He tolerated well without pain meds.  NO changes to care at this time.     He continues to be mobil and even ambulated hallway with physical therapy.     Will continue to do weekly rechecks while in hospital.      04/12/2024

## 2024-04-12 NOTE — PLAN OF CARE
04/12/24 1305   Final Note   Assessment Type Final Discharge Note   Anticipated Discharge Disposition Home-Health   Hospital Resources/Appts/Education Provided Post-Acute resouces added to AVS   Post-Acute Status   Post-Acute Authorization Home Health   Home Health Status Set-up Complete/Auth obtained   Discharge Delays None known at this time     Discharge information sent to Mark Gutierrez via [employer]. Portable O2 has been delivered to bedside.

## 2024-04-12 NOTE — DISCHARGE SUMMARY
[facility] Hospital Medicine Discharge Summary    Admit Date: 3/17/2024  Discharge Date and Time: 4/12/202410:05 AM  Admitting Physician: ANUJA Team  Discharging Physician: Giana Garcia MD.  Primary Care Physician: Abiodun Recinos DO  Consults:  Cardiology, Urology, GI, surgery    Discharge Diagnoses:  Scrotal abscess, with component of early Claudine's, s/p exploration, I&D, and left orchiectomy 03/18.  Severe sepsis from above, resolved  Questionable RLL post-viral Pneumonia, Human Metapneumovirus positive, resolving  Acute hypoxic respiratory failure from above  Cardiac arrest with ventricular tachycardia requiring defibrillation x3 (03/18/2024)  Acute on chronic diastolic heart failure  Chronic AFib  Nonischemic cardiomyopathy with recovered EF   Nonobstructive coronary artery disease   Valvular heart disease moderate MR  Infrarenal abdominal aortic aneurysm dilation with mural thrombus  NSTMI, unspecified  Partial bowel obstruction  Normocytic anemia.    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  Mild ELIZABETH, resolved       Hospital Course:   68-year-old male with a history that includes VHD, nonischemic cardiomyopathy (with an EF of 41%),  chronic AFib on Xarelto, peripheral arterial disease, COPD, and a large left testicle hydrocele, presented to the ED 3/17 complaining of what he thought was rectal bleeding as he was noting blood in the toilet and running down his legs.  Ultimately was found that the bleeding was coming from the posterior aspect of his scrotum from a superficial scrotal ulceration.  He also complained of persistent testicular swelling and pain.  Doppler ultrasound was significantly limited, but could not rule out the presence of torsion.  Urology was consulted and they made decision to proceed with emergent scrotal exploration.  While still in PACU, patient was  hypotensive with blood persistently 80s/50s, this despite fluid resuscitation.   Patient subsequently transferred to the ICU for vasopressor support.  Additionally developed cardiac arrest with ventricular tachycardia requiring defibrillation x3 (03/18/2024), with ROSC.   He was continued on IV antibiotics, and eventually weaned off pressors.  Additionally FOB noted to be positive.  GI consulted and planned for EGD.  Cardiology involved as well, with plans for Ischemic evaluation.  Cleared for AC and started on FD lovenox.  Underwent EGD 3/22 noting grade B reflux esophagitis, chronic gastritis, chronic duodenitis.  GI considering outpatient colonoscopy, recommended Carafate for 7 days and continued PPI BID for a month on discharge and then daily for 3 months.  Cardiology on board, awaiting ischemic evaluation.  Urology continued to follow.  Patient continued on IV antibiotics; working with therapy.  On  evening of 3/28 he developed worsening hypoxemia.  CXR noted bilateral scattered opacities, and he was requiring BiPAP for oxygenation.  Antibiotics broadened, and he was initiated on IV Lasix.  He subsequently diuresed well (a negative fluid balance >5L over 48hrs).  Subsequently transitioned off BiPAP back to supplemental oxygen, and remained stable.  Patient completed IV antibiotics for total of 2 weeks patient was weaned off the oxygen and was on nasal cannula PT and OT was consulted patient was doing very well home oxygen was arranged and patient was discharged home in stable condition  Pt was seen and examined on the day of discharge  Vitals:  VITAL SIGNS: 24 HRS MIN & MAX LAST   Temp  Min: 97.4 °F (36.3 °C)  Max: 98.2 °F (36.8 °C) 97.5 °F (36.4 °C)   BP  Min: 101/62  Max: 134/74 101/62   Pulse  Min: 61  Max: 82  69   Resp  Min: 14  Max: 20 18   SpO2  Min: 87 %  Max: 98 % 98 %       Physical Exam:  General: In no acute distress, afebrile  Chest:  Coarse bilaterally  Heart: RRR, +S1, S2, no appreciable  murmur  Abdomen: Soft, nontender, BS +  MSK: Warm, no lower extremity edema, no clubbing or cyanosis  Neurologic: Alert and oriented x4,     Procedures Performed: No admission procedures for hospital encounter.     Significant Diagnostic Studies: See Full reports for all details    Recent Labs   Lab 04/09/24  0616 04/11/24  1326 04/12/24  0335   WBC 10.83 10.37 8.87   RBC 3.07* 2.85* 2.70*   HGB 9.2* 8.5* 8.1*   HCT 30.1* 28.4* 26.2*   MCV 98.0* 99.6* 97.0*   MCH 30.0 29.8 30.0   MCHC 30.6* 29.9* 30.9*   RDW 16.9 17.3* 17.2*    177 174   MPV 10.0 10.6* 10.9*       Recent Labs   Lab 04/09/24  0616 04/09/24  0807 04/11/24  1326 04/12/24  0335     --  142 141   K 3.7  --  3.4* 3.8   CO2 29  --  28 31   BUN 29.9*  --  25.7 32.3*   CREATININE 0.81  --  1.11 0.93   CALCIUM 8.9  --  8.6* 8.9   PH  --  7.520*  --   --    ALBUMIN  --   --   --  3.0*   ALKPHOS  --   --   --  77   ALT  --   --   --  17   AST  --   --   --  21   BILITOT  --   --   --  1.4        Microbiology Results (last 7 days)       Procedure Component Value Units Date/Time    Urine culture [[id number]] Collected: 04/07/24 2023    Order Status: Completed Specimen: Urine Updated: 04/09/24 0648     Urine Culture No Growth             X-Ray Chest 1 View  Narrative: EXAMINATION:  XR CHEST 1 VIEW    CLINICAL HISTORY:  hypoxia;    TECHNIQUE:  Single frontal view of the chest was performed.    COMPARISON:  None    FINDINGS:  LINES AND TUBES: EKG/telemetry leads overlie the chest.    MEDIASTINUM AND MARTA: Cardiac silhouette is enlarged. Aortic atherosclerosis.    LUNGS: Persistent though improved interstitial opacities within the lungs.    PLEURA:No pleural effusion. No pneumothorax.    OTHER: No acute osseous abnormality.  Impression: Persistent though improved interstitial opacities within the lungs.    Electronically signed by: Sailaja Atkinson  Date:    04/11/2024  Time:    13:41         Medication List        START taking these medications       ALPRAZolam 0.25 MG tablet  Commonly known as: XANAX  Take 1 tablet (0.25 mg total) by mouth 3 (three) times daily as needed for Anxiety.     aspirin 81 MG EC tablet  Commonly known as: ECOTRIN  Take 1 tablet (81 mg total) by mouth once daily.     atorvastatin 80 MG tablet  Commonly known as: LIPITOR  Take 1 tablet (80 mg total) by mouth once daily.  Replaces: simvastatin 40 MG tablet     folic acid 1 MG tablet  Commonly known as: FOLVITE  Take 1 tablet (1 mg total) by mouth once daily.     metoprolol succinate 25 MG 24 hr tablet  Commonly known as: TOPROL-XL  Take 0.5 tablets (12.5 mg total) by mouth once daily.     midodrine 10 MG tablet  Commonly known as: PROAMATINE  Take 1 tablet (10 mg total) by mouth 3 (three) times daily with meals.     pantoprazole 40 MG tablet  Commonly known as: PROTONIX  Take 1 tablet (40 mg total) by mouth 2 (two) times a day.     predniSONE 10 MG tablet  Commonly known as: DELTASONE  Take 1 tablet (10 mg total) by mouth once daily. for 5 days            CHANGE how you take these medications      furosemide 40 MG tablet  Commonly known as: LASIX  Take 1 tablet (40 mg total) by mouth once daily.  What changed:   medication strength  how much to take            CONTINUE taking these medications      cetirizine 10 MG tablet  Commonly known as: ZYRTEC  Take 1 tablet (10 mg total) by mouth once daily. for 14 days     empagliflozin 10 mg tablet  Commonly known as: Jardiance  Take 1 tablet (10 mg total) by mouth once daily.     hydrOXYzine HCL 10 MG Tab  Commonly known as: ATARAX     rivaroxaban 20 mg Tab  Commonly known as: XARELTO     spironolactone 25 MG tablet  Commonly known as: ALDACTONE  Take 1 tablet (25 mg total) by mouth once daily.     triamcinolone acetonide 0.1% 0.1 % cream  Commonly known as: KENALOG            STOP taking these medications      clopidogreL 75 mg tablet  Commonly known as: PLAVIX     sacubitriL-valsartan 49-51 mg per tablet  Commonly known as: ENTRESTO      simvastatin 40 MG tablet  Commonly known as: ZOCOR  Replaced by: atorvastatin 80 MG tablet               Where to Get Your Medications        These medications were sent to [facility] [place] [place]  [address]  [address] [facility] [address] [facility] [address] [address]      Phone: [phone]   ALPRAZolam 0.25 MG tablet  aspirin 81 MG EC tablet  atorvastatin 80 MG tablet  folic acid 1 MG tablet  furosemide 40 MG tablet  metoprolol succinate 25 MG 24 hr tablet  midodrine 10 MG tablet  pantoprazole 40 MG tablet  predniSONE 10 MG tablet          Explained in detail to the patient about the discharge plan, medications, and follow-up visits. Pt understands and agrees with the treatment plan  Discharge Disposition: Home or Self Care   Discharged Condition: stable  Diet-   Dietary Orders (From admission, onward)       Start     Ordered    04/03/24 1554  Dietary nutrition supplements Ge - Fruit Punch; BID  Continuous        Question Answer Comment   Select PO Supplement: Ge - Fruit Punch    Frequency: BID        04/03/24 1554    04/03/24 1554  Dietary nutrition supplements Boost Plus Nutritional Drink - Rich Chocolate; TID  Continuous        Question Answer Comment   Select PO Supplement: Boost Plus Nutritional Drink - Rich Chocolate    Frequency: TID        04/03/24 1554    03/26/24 1813  Diet Heart Healthy  Diet effective now         03/26/24 1812                   Medications Per DC med rec  Activities as tolerated   Follow-up Information       Harris Wagner MD Follow up on 4/8/2024.    Specialty: Cardiology  Why: Blood Work & EKG @ 1:30 pm  Contact information:  [place] [address]  [phone]               Harris Wagner MD Follow up on 4/11/2024.    Specialty: Cardiology  Why: Hospital follow-up @ 8:50 am  Contact information:  [place] [address]  [phone]               [facility] - Follow up.    Specialties: Home Health Services,  Physical Therapy  Why: This is your home health provider. You may contact the office for any questions or concerns regarding your home health services.  Contact information:  [address] [address]  [phone]               Abiodun Recinos, DO Follow up in 2 week(s).    Specialty: Internal Medicine  Why: SPOKE WITH BARBARA @ 11:35 AM. APPT SCHEDULED FOR APRIL 23, 2024 @1:00 PM WITH DR. YVETTE ROONEY.  Contact information:  [address] [address]  [phone]               Abiodun Recinos, DO Follow up in 1 week(s).    Specialty: Internal Medicine  Contact information:  [address] [address]  [phone]                           For further questions contact hospitalist office    Discharge time 33 minutes    For worsening symptoms, chest pain, shortness of breath, increased abdominal pain, high grade fever, stroke or stroke like symptoms, immediately go to the nearest Emergency Room or call 911 as soon as possible.      Giana Connolly M.D, on 4/12/2024. at 10:05 AM.

## 2024-04-16 ENCOUNTER — PATIENT OUTREACH (OUTPATIENT)
Dept: ADMINISTRATIVE | Facility: CLINIC | Age: 69
End: 2024-04-16
Payer: MEDICARE

## 2024-04-16 ENCOUNTER — NURSE TRIAGE (OUTPATIENT)
Dept: ADMINISTRATIVE | Facility: CLINIC | Age: 69
End: 2024-04-16
Payer: MEDICARE

## 2024-04-16 NOTE — PROGRESS NOTES
C3 nurse spoke with Ran Reyes Jr.  for a TCC post hospital discharge follow up call. The patient has a scheduled HOSFU appointment with Dr. Merle Sun 4/23/24 @1:00pm.   Pt stated has swelling BL lower extremities. Unable to put on shoes due to swelling, slightly SOB and has a little blood in sputum. Offered to transfer pt's call to OOC nurse, pt verbalized understanding and agreed. Transferred pt call to triage nurse.

## 2024-04-16 NOTE — TELEPHONE ENCOUNTER
Duplicate encounter, pt being assisted by another triage rn.    Reason for Disposition   Caller has already spoken with another triager and has no further questions    Protocols used: No Contact or Duplicate Contact Call-A-[place]

## 2024-04-16 NOTE — TELEPHONE ENCOUNTER
"Both ankles are swollen. Weight 198. Difficultly breathing when he bend over to but on socks. He doesn't think lasix is working.  02 2l. Last hospital stay was 27 days. Care advice recommend pt come into office now. Pt stated he was going to see if he could get transportation to bring him to his pcp office. Pt instructed to go to [facility] if not. Pt verbalized understanding.   Reason for Disposition   MILD difficulty breathing (e.g., minimal/no SOB at rest, SOB with walking, pulse < 100) of new-onset or worse than normal    Additional Information   Negative: SEVERE difficulty breathing (e.g., struggling for each breath, speaks in single words, pulse > 120)   Negative: Breathing stopped and hasn't returned   Negative: Choking on something   Negative: Bluish (or gray) lips or face   Negative: Difficult to awaken or acting confused (e.g., disoriented, slurred speech)   Negative: Passed out (i.e., fainted, collapsed and was not responding)   Negative: Wheezing started suddenly after medicine, an allergic food, or bee sting   Negative: Stridor (harsh sound while breathing in)   Negative: Slow, shallow and weak breathing   Negative: Sounds like a life-threatening emergency to the triager   Negative: MODERATE difficulty breathing (e.g., speaks in phrases, SOB even at rest, pulse 100-120) of new-onset or worse than normal   Negative: Oxygen level (e.g., pulse oximetry) 90% or lower   Negative: Wheezing can be heard across the room   Negative: Drooling or spitting out saliva (because can't swallow)   Negative: Any history of prior "blood clot" in leg or lungs   Negative: Illness requiring prolonged bedrest in past month (e.g., immobilization, long hospital stay)   Negative: Hip or leg fracture (broken bone) in past month (or had cast on leg or ankle in past month)   Negative: Major surgery in the past month   Negative: Long-distance travel in past month (e.g., car, bus, train, plane; with trip lasting 6 or more hours)   " Negative: Cancer treatment in past six months (or has cancer now)   Negative: Extra heartbeats, irregular heart beating, or heart is beating very fast (i.e., 'palpitations')   Negative: Fever > 103 F (39.4 C)   Negative: Fever > 101 F (38.3 C) and over 60 years of age   Negative: Fever > 100.0 F (37.8 C) and bedridden (e.g., nursing home patient, stroke, chronic illness, recovering from surgery)   Negative: Fever > 100.0 F (37.8 C) and diabetes mellitus or weak immune system (e.g., HIV positive, cancer chemo, splenectomy, organ transplant, chronic steroids)   Negative: Periods where breathing stops and then resumes normally and bedridden (e.g., nursing home patient, CVA)   Negative: Patient sounds very sick or weak to the triager   Negative: Pregnant or postpartum (from 0 to 6 weeks after delivery)    Protocols used: Breathing Difficulty-A-[place]

## 2024-04-16 NOTE — PROGRESS NOTES
C3 nurse attempted to contact Ran Reyes Jr.  for a TCC post hospital discharge follow up call. No answer. Left voicemail with callback information. The patient has a scheduled HOSFU appointment with Dr. Merle Sun 4/23/24 @1:00pm

## 2024-04-19 ENCOUNTER — NURSE TRIAGE (OUTPATIENT)
Dept: ADMINISTRATIVE | Facility: CLINIC | Age: 69
End: 2024-04-19
Payer: MEDICARE

## 2024-04-19 ENCOUNTER — HOSPITAL ENCOUNTER (EMERGENCY)
Facility: HOSPITAL | Age: 69
Discharge: HOME | End: 2024-04-19
Attending: EMERGENCY MEDICINE
Payer: MEDICARE

## 2024-04-19 VITALS
DIASTOLIC BLOOD PRESSURE: 79 MMHG | HEIGHT: 71 IN | HEART RATE: 72 BPM | RESPIRATION RATE: 20 BRPM | TEMPERATURE: 98 F | BODY MASS INDEX: 28.42 KG/M2 | WEIGHT: 203 LBS | SYSTOLIC BLOOD PRESSURE: 127 MMHG | OXYGEN SATURATION: 96 %

## 2024-04-19 DIAGNOSIS — R07.9 CHEST PAIN: Primary | ICD-10-CM

## 2024-04-19 DIAGNOSIS — I50.9 CONGESTIVE HEART FAILURE, UNSPECIFIED HF CHRONICITY, UNSPECIFIED HEART FAILURE TYPE: ICD-10-CM

## 2024-04-19 LAB
ALBUMIN SERPL-MCNC: 3.2 G/DL (ref 3.4–4.8)
ALBUMIN/GLOB SERPL: 1 RATIO (ref 1.1–2)
ALP SERPL-CCNC: 160 UNIT/L (ref 40–150)
ALT SERPL-CCNC: 46 UNIT/L (ref 0–55)
AST SERPL-CCNC: 56 UNIT/L (ref 5–34)
BASOPHILS # BLD AUTO: 0.01 X10(3)/MCL
BASOPHILS NFR BLD AUTO: 0.2 %
BILIRUB SERPL-MCNC: 2.1 MG/DL
BNP BLD-MCNC: 836.5 PG/ML
BUN SERPL-MCNC: 14.7 MG/DL (ref 8.4–25.7)
CALCIUM SERPL-MCNC: 8.7 MG/DL (ref 8.8–10)
CHLORIDE SERPL-SCNC: 110 MMOL/L (ref 98–107)
CO2 SERPL-SCNC: 19 MMOL/L (ref 23–31)
CREAT SERPL-MCNC: 0.98 MG/DL (ref 0.73–1.18)
EOSINOPHIL # BLD AUTO: 0.12 X10(3)/MCL (ref 0–0.9)
EOSINOPHIL NFR BLD AUTO: 2 %
ERYTHROCYTE [DISTWIDTH] IN BLOOD BY AUTOMATED COUNT: 18.3 % (ref 11.5–17)
FLUAV AG UPPER RESP QL IA.RAPID: NOT DETECTED
FLUBV AG UPPER RESP QL IA.RAPID: NOT DETECTED
GFR SERPLBLD CREATININE-BSD FMLA CKD-EPI: >60 MLS/MIN/1.73/M2
GLOBULIN SER-MCNC: 3.2 GM/DL (ref 2.4–3.5)
GLUCOSE SERPL-MCNC: 88 MG/DL (ref 82–115)
HCT VFR BLD AUTO: 27 % (ref 42–52)
HGB BLD-MCNC: 8.1 G/DL (ref 14–18)
IMM GRANULOCYTES # BLD AUTO: 0.01 X10(3)/MCL (ref 0–0.04)
IMM GRANULOCYTES NFR BLD AUTO: 0.2 %
LYMPHOCYTES # BLD AUTO: 1.51 X10(3)/MCL (ref 0.6–4.6)
LYMPHOCYTES NFR BLD AUTO: 25.3 %
MCH RBC QN AUTO: 30 PG (ref 27–31)
MCHC RBC AUTO-ENTMCNC: 30 G/DL (ref 33–36)
MCV RBC AUTO: 100 FL (ref 80–94)
MONOCYTES # BLD AUTO: 0.34 X10(3)/MCL (ref 0.1–1.3)
MONOCYTES NFR BLD AUTO: 5.7 %
NEUTROPHILS # BLD AUTO: 3.97 X10(3)/MCL (ref 2.1–9.2)
NEUTROPHILS NFR BLD AUTO: 66.6 %
NRBC BLD AUTO-RTO: 0 %
PLATELET # BLD AUTO: 133 X10(3)/MCL (ref 130–400)
PMV BLD AUTO: 10.3 FL (ref 7.4–10.4)
POTASSIUM SERPL-SCNC: 4.2 MMOL/L (ref 3.5–5.1)
PROT SERPL-MCNC: 6.4 GM/DL (ref 5.8–7.6)
RBC # BLD AUTO: 2.7 X10(6)/MCL (ref 4.7–6.1)
SARS-COV-2 RNA RESP QL NAA+PROBE: NOT DETECTED
SODIUM SERPL-SCNC: 140 MMOL/L (ref 136–145)
TROPONIN I SERPL-MCNC: <0.01 NG/ML (ref 0–0.04)
WBC # SPEC AUTO: 5.96 X10(3)/MCL (ref 4.5–11.5)

## 2024-04-19 PROCEDURE — 96374 THER/PROPH/DIAG INJ IV PUSH: CPT

## 2024-04-19 PROCEDURE — 63600175 PHARM REV CODE 636 W HCPCS: Performed by: EMERGENCY MEDICINE

## 2024-04-19 PROCEDURE — 84484 ASSAY OF TROPONIN QUANT: CPT | Performed by: EMERGENCY MEDICINE

## 2024-04-19 PROCEDURE — 85025 COMPLETE CBC W/AUTO DIFF WBC: CPT | Performed by: EMERGENCY MEDICINE

## 2024-04-19 PROCEDURE — 83880 ASSAY OF NATRIURETIC PEPTIDE: CPT | Performed by: EMERGENCY MEDICINE

## 2024-04-19 PROCEDURE — 0240U COVID/FLU A&B PCR: CPT | Performed by: EMERGENCY MEDICINE

## 2024-04-19 PROCEDURE — 99284 EMERGENCY DEPT VISIT MOD MDM: CPT | Mod: 25

## 2024-04-19 PROCEDURE — 80053 COMPREHEN METABOLIC PANEL: CPT | Performed by: EMERGENCY MEDICINE

## 2024-04-19 RX ORDER — FUROSEMIDE 10 MG/ML
40 INJECTION INTRAMUSCULAR; INTRAVENOUS
Status: COMPLETED | OUTPATIENT
Start: 2024-04-19 | End: 2024-04-19

## 2024-04-19 RX ADMIN — FUROSEMIDE 40 MG: 10 INJECTION, SOLUTION INTRAMUSCULAR; INTRAVENOUS at 09:04

## 2024-04-19 NOTE — TELEPHONE ENCOUNTER
Coughing and feet are swollen. He was discharged on Friday from the 4th floor. Trouble breathing and tightness in chest constant. Pt stated he can barely put his shoes on and his feet are really swollen on top and around the ankles. Spitting up a little blood. Care advice recommend pt call 911. Pt verbalized understanding.    Reason for Disposition   Chest pain lasting longer than 5 minutes and over 44 years old    Additional Information   Negative: SEVERE difficulty breathing (e.g., struggling for each breath, speaks in single words)   Negative: Difficult to awaken or acting confused (e.g., disoriented, slurred speech)   Negative: Shock suspected (e.g., cold/pale/clammy skin, too weak to stand, low BP, rapid pulse)   Negative: Passed out (i.e., lost consciousness, collapsed and was not responding)    Protocols used: Chest Pain-A-OH

## 2024-04-19 NOTE — ED PROVIDER NOTES
Encounter Date: 4/19/2024    SCRIBE #1 NOTE: I, Ledy Paniagua, am scribing for, and in the presence of,  Clemente Jiang III, MD. I have scribed the following portions of the note - the EKG reading. Other sections scribed: HPI, ROS, PE.       History     Chief Complaint   Patient presents with    Chest Pain     Presents via [facility] with c/o SOB and chest tightness. Hx of CHF, on Lasix. SOB exacerbated by laying flat. Recently d/c from the ICU following post cardiac arrest. D/c with home oxygen. Given 324mg ASA en route.      68 year old male with a hx of afib, CHF, COPD, CAD, HLD, HTN, PAD, and hospital admission on 3/17 to 4/12 presents to the ED via EMS for shortness of breath, chest tightness, and lower extremity swelling. He received 324 mg of ASA en route that improved his chest tightness and shortness of breath. The patient reports initially improving after he was discharged from the hospital but started feeling bad again yesterday. He reports that his furosemide was reduced from 20mg twice daily to once daily. He is a smoker. His cardiologist is Dr. Wagner. [facility]     The history is provided by the patient. No  was used.   Shortness of Breath  This is a recurrent problem. The problem occurs continuously.The current episode started yesterday. The problem has been resolved. Associated symptoms include leg swelling. Pertinent negatives include no fever, no cough, no chest pain, no vomiting and no abdominal pain. He has had Prior hospitalizations. He has had Prior ED visits. He has had Prior ICU admissions. Associated medical issues include COPD and CAD.     Review of patient's allergies indicates:  No Known Allergies  Past Medical History:   Diagnosis Date    A-fib     Anticoagulant long-term use     Aortic aneurysm     Cataract     CHF (congestive heart failure)     Chronic atrial fibrillation     COPD (chronic obstructive pulmonary disease)      Coronary artery disease     HLD (hyperlipidemia)     Hypertension     Mitral regurgitation     PAD (peripheral artery disease)     Primary open angle glaucoma (POAG)      Past Surgical History:   Procedure Laterality Date    ANGIOGRAM, CORONARY, WITH LEFT HEART CATHETERIZATION N/A 3/26/2024    Procedure: Angiogram, Coronary, with Left Heart Cath;  Surgeon: Adriano Montiel MD;  Location: [facility] CATH LAB;  Service: Cardiology;  Laterality: N/A;    ATHERECTOMY OF PERIPHERAL VESSEL Left 09/12/2022    LEFT SFA ATHERECTOMY, BALLOON ANGIOPLASTY    CATARACT EXTRACTION W/  INTRAOCULAR LENS IMPLANT Left 3/9/2023    Procedure: EXTRACTION, CATARACT, WITH IOL INSERTION;  Surgeon: Georgi Borja MD;  Location: [facility];  Service: Ophthalmology;  Laterality: Left;  19.5  mac    EGD, WITH CLOSED BIOPSY  3/22/2024    Procedure: EGD, WITH CLOSED BIOPSY;  Surgeon: Ronald Calderon MD;  Location: [facility] ENDOSCOPY;  Service: Gastroenterology;;    ESOPHAGOGASTRODUODENOSCOPY N/A 3/22/2024    Procedure: EGD;  Surgeon: Ronald Calderon MD;  Location: [facility] ENDOSCOPY;  Service: Gastroenterology;  Laterality: N/A;    Heart Stent N/A     > 10yrs. AGO    INCISION AND DRAINAGE N/A 3/18/2024    Procedure: Incision and Drainage;  Surgeon: Fahad Rivas MD;  Location: [facility];  Service: Urology;  Laterality: N/A;  I&D SCROTAL ABSCESS    INSERTION OF STENT INTO PERIPHERAL VESSEL Right 10/17/2022    RIGHT SFA ATHERECTOMY, BALLOON ANGIOPLASTY, STENT; RIGHT ANTERIOR TIBIAL ARTERY ATHERECTOMY, BALLOON ANGIOPLASTY    ORCHIECTOMY Left 3/18/2024    Procedure: ORCHIECTOMY;  Surgeon: Fahad Rivas MD;  Location: [facility];  Service: Urology;  Laterality: Left;     Family History   Problem Relation Name Age of Onset    Cancer Mother      Hypertension Mother      Heart failure Father      Stroke Father      Hypertension Father      No Known Problems Sister       Social History     Tobacco Use    Smoking status: Every Day      Current packs/day: 0.25     Average packs/day: 0.3 packs/day for 40.0 years (10.0 ttl pk-yrs)     Types: Cigarettes     Passive exposure: Current    Smokeless tobacco: Never   Substance Use Topics    Alcohol use: Yes     Alcohol/week: 3.0 standard drinks of alcohol     Types: 3 Cans of beer per week     Comment: socially    Drug use: Yes     Frequency: 1.0 times per week     Types: Marijuana     Comment: no use in over 2 months     Review of Systems   Constitutional:  Negative for chills and fever.   Respiratory:  Positive for chest tightness and shortness of breath. Negative for cough.    Cardiovascular:  Positive for leg swelling. Negative for chest pain.   Gastrointestinal:  Negative for abdominal pain, nausea and vomiting.   Musculoskeletal:  Negative for myalgias.   All other systems reviewed and are negative.      Physical Exam     Initial Vitals [04/19/24 0847]   BP Pulse Resp Temp SpO2   125/79 69 20 98.4 °F (36.9 °C) 99 %      MAP       --         Physical Exam    Nursing note and vitals reviewed.  Constitutional: He appears well-developed and well-nourished. He appears distressed (moderate).   HENT:   Head: Normocephalic and atraumatic.   Eyes: EOM are normal. Pupils are equal, round, and reactive to light.   Neck: Neck supple.   Normal range of motion.  Cardiovascular:  Normal rate, regular rhythm, normal heart sounds and intact distal pulses.           Pulmonary/Chest: He has no wheezes.   Crackles in the bilateral bases    Abdominal: Abdomen is soft. Bowel sounds are normal.   Musculoskeletal:         General: Normal range of motion.      Cervical back: Normal range of motion and neck supple.      Right lower leg: 3+ Edema present.      Left lower leg: 3+ Edema present.     Neurological: He is alert and oriented to person, place, and time. He has normal strength. GCS score is 15. GCS eye subscore is 4. GCS verbal subscore is 5. GCS motor subscore is 6.   Skin: Skin is warm and dry. Capillary refill  takes less than 2 seconds.   Psychiatric: He has a normal mood and affect. Judgment normal.         ED Course   Procedures  Labs Reviewed   COMPREHENSIVE METABOLIC PANEL - Abnormal; Notable for the following components:       Result Value    Chloride 110 (*)     Carbon Dioxide 19 (*)     Calcium Level Total 8.7 (*)     Albumin Level 3.2 (*)     Albumin/Globulin Ratio 1.0 (*)     Bilirubin Total 2.1 (*)     Alkaline Phosphatase 160 (*)     Aspartate Aminotransferase 56 (*)     All other components within normal limits   B-TYPE NATRIURETIC PEPTIDE - Abnormal; Notable for the following components:    Natriuretic Peptide 836.5 (*)     All other components within normal limits   CBC WITH DIFFERENTIAL - Abnormal; Notable for the following components:    RBC 2.70 (*)     Hgb 8.1 (*)     Hct 27.0 (*)     .0 (*)     MCHC 30.0 (*)     RDW 18.3 (*)     All other components within normal limits   TROPONIN I - Normal   COVID/FLU A&B PCR - Normal    Narrative:     The Xpert Xpress SARS-CoV-2/FLU/RSV plus is a rapid, multiplexed real-time PCR test intended for the simultaneous qualitative detection and differentiation of SARS-CoV-2, Influenza A, Influenza B, and respiratory syncytial virus (RSV) viral RNA in either nasopharyngeal swab or nasal swab specimens.         CBC W/ AUTO DIFFERENTIAL    Narrative:     The following orders were created for panel order CBC Auto Differential.  Procedure                               Abnormality         Status                     ---------                               -----------         ------                     CBC with Differential[[id number]]       Abnormal            Final result                 Please view results for these tests on the individual orders.     EKG Readings: (Independently Interpreted)   Initial Reading: No STEMI. Rhythm: Atrial Fibrillation. Heart Rate: 59. Ectopy: No Ectopy PVCs. Conduction: Normal. ST Segments: Normal ST Segments. T Waves Flipped: I and AVL.  Axis: Normal. Clinical Impression: Atrial Fibrillation   Done at 08:37, normal ventricular response        Imaging Results              X-Ray Chest 1 View (Final result)  Result time 04/19/24 10:09:53      Final result by Marino Frazier MD (04/19/24 10:09:53)                   Impression:      Slightly increased interstitial markings of the right lower lobe.  Recommend continued follow-up.      Electronically signed by: Marino Frazier  Date:    04/19/2024  Time:    10:09               Narrative:    EXAMINATION:  XR CHEST 1 VIEW    CLINICAL HISTORY:  dyspnea;    TECHNIQUE:  Single view of the chest    COMPARISON:  04/11/2024    FINDINGS:  Slightly increased interstitial markings of the right lower lobe.    The cardiomediastinal silhouette is within normal limits.    No acute osseous abnormality.                                       Medications   furosemide injection 40 mg (40 mg Intravenous Given 4/19/24 0956)     Medical Decision Making  The differential diagnosis includes, but is not limited to, NSTEMI, anemia, CHF, renal failure     Patient CBC chemistry without abnormality elevated BNP and these symptoms started after patient was taken off of h his twice a day Lasix and placed on once a day given 1 dose of IV Lasix patient completely relieved with diuresis of 600 cc discussed case with Cardiology will increase Lasix over the weekend they will follow up next week return emergency room as needed patient asking to go home    Problems Addressed:  Congestive heart failure, unspecified HF chronicity, unspecified heart failure type: complicated acute illness or injury that poses a threat to life or bodily functions    Amount and/or Complexity of Data Reviewed  Labs: ordered.  Radiology: ordered.  ECG/medicine tests: ordered and independent interpretation performed.     Details: No STEMI. Rhythm: Atrial Fibrillation. Heart Rate: 59. Ectopy: No Ectopy PVCs. Conduction: Normal. ST Segments: Normal ST Segments. T Waves  Flipped: I and AVL. Axis: Normal. Clinical Impression: Atrial Fibrillation   Done at 08:37, normal ventricular response      Risk  Prescription drug management.            Scribe Attestation:   Scribe #1: I performed the above scribed service and the documentation accurately describes the services I performed. I attest to the accuracy of the note.    Attending Attestation:           Physician Attestation for Scribe:  Physician Attestation Statement for Scribe #1: I, Clemente Jiang III, MD, reviewed documentation, as scribed by Ledy Paniagua in my presence, and it is both accurate and complete.             ED Course as of 04/19/24 1349   Fri Apr 19, 2024   1316 Paged CIS [MM]      ED Course User Index  [MM] Ledy Paniagua                           Clinical Impression:  Final diagnoses:  [I50.9] Congestive heart failure, unspecified HF chronicity, unspecified heart failure type (Primary)          ED Disposition Condition    Discharge Stable          ED Prescriptions    None       Follow-up Information       Follow up With Specialties Details Why Contact Info    Harris Wagner MD Cardiology Follow up on 4/23/2024 @ 2:40PM Joanna Kat [facility] [address]  [phone]      Abiodun Recinos,  Internal Medicine In 3 days  [address] [facility] [address]  [phone]               Clemente Jiang III, MD  04/19/24 0693

## 2024-04-21 LAB
OHS QRS DURATION: 100 MS
OHS QTC CALCULATION: 457 MS

## 2024-04-23 ENCOUNTER — OFFICE VISIT (OUTPATIENT)
Dept: INTERNAL MEDICINE | Facility: CLINIC | Age: 69
End: 2024-04-23
Payer: MEDICARE

## 2024-04-23 VITALS
DIASTOLIC BLOOD PRESSURE: 74 MMHG | TEMPERATURE: 99 F | WEIGHT: 202.63 LBS | RESPIRATION RATE: 18 BRPM | HEIGHT: 71 IN | OXYGEN SATURATION: 97 % | HEART RATE: 61 BPM | BODY MASS INDEX: 28.37 KG/M2 | SYSTOLIC BLOOD PRESSURE: 123 MMHG

## 2024-04-23 DIAGNOSIS — I50.9 CHRONIC CONGESTIVE HEART FAILURE, UNSPECIFIED HEART FAILURE TYPE: Primary | ICD-10-CM

## 2024-04-23 PROCEDURE — 99214 OFFICE O/P EST MOD 30 MIN: CPT | Mod: PBBFAC | Performed by: INTERNAL MEDICINE

## 2024-04-23 RX ORDER — FUROSEMIDE 40 MG/1
40 TABLET ORAL 2 TIMES DAILY
Qty: 180 TABLET | Refills: 3 | Status: SHIPPED | OUTPATIENT
Start: 2024-04-23 | End: 2025-04-23

## 2024-04-23 NOTE — PROGRESS NOTES
[facility] INTERNAL MEDICINE  OUTPATIENT OFFICE VISIT NOTE    SUBJECTIVE:      HPI: DC follow up  PCP Dr Recinos  Has cardiology and urology appts scheduled    Says scrotal swelling improved, no bleeding, no drainage  Says gets sob when he leans over, having lower extremity edema since getting out of the hospital.    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  HEENT: Denies changes in vision and hearing.  ?RESPIRATORY: Denies SOB and cough.?  CV: Denies palpitations and CP. ?  GI: Denies abdominal pain, nausea, vomiting and diarrhea.?  : Denies dysuria and urinary frequency.?  MSK: Denies myalgia and joint pain.?  SKIN: Denies rash and pruritus.  ?NEUROLOGICAL: Denies headache and syncope.?  PSYCHIATRIC: Denies recent changes in mood. Denies anxiety and depression.    Medication list reviewed with the patient-all medications identified  Discussed with patient midodrine use-patient states he take 1-2 pills per day     OBJECTIVE:     Physical Examination:    Vital signs:     Vitals:    04/23/24 1327   BP: 123/74   Pulse: 61   Resp: 18   Temp: 99.3 °F (37.4 °C)        General: Well nourished , 2 liter nc in place  Noted sob when he bends over to tie shoes, resolved within one minute  No Jvd noted sitting  Heart-irrr  Lungs-clear BS  Ext- 2+ lower ext edema/pedal edema  Scrotal area with no discharge, pink tissue at left scrotal area, no  induration  Psych: Cooperative; appropriate mood and affect           ASSESSMENT & PLAN:   Healing scrotal abscess-Urology follow up as scheduled  Fluid overload-increase lasix to 40 mg BID for 3 days then q day, daily weights with increase in lasix to BID if gained 4 pounds in 3 days  Hx of HFpEF-needs more diuresing  Has appt with cardiology today  Patient to discuss with cardiology midodrine         Keep scheduled follow up with cardiology and will schedule follow up with Dr Jorje Moscoso MD

## 2024-04-25 PROCEDURE — 99285 EMERGENCY DEPT VISIT HI MDM: CPT

## 2024-04-26 ENCOUNTER — HOSPITAL ENCOUNTER (INPATIENT)
Facility: HOSPITAL | Age: 69
LOS: 4 days | Discharge: HOME HEALTH | DRG: 177 | End: 2024-04-30
Attending: EMERGENCY MEDICINE | Admitting: INTERNAL MEDICINE
Payer: MEDICARE

## 2024-04-26 DIAGNOSIS — R10.2 SUPRAPUBIC ABDOMINAL PAIN: ICD-10-CM

## 2024-04-26 DIAGNOSIS — J96.21 ACUTE ON CHRONIC RESPIRATORY FAILURE WITH HYPOXIA: ICD-10-CM

## 2024-04-26 DIAGNOSIS — D64.9 CHRONIC ANEMIA: ICD-10-CM

## 2024-04-26 DIAGNOSIS — J18.9 HOSPITAL-ACQUIRED PNEUMONIA: ICD-10-CM
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
## 2024-04-26 DIAGNOSIS — R52 PAIN: ICD-10-CM

## 2024-04-26 DIAGNOSIS — N50.89 OTHER SPECIFIED DISORDERS OF THE MALE GENITAL ORGANS: ICD-10-CM

## 2024-04-26 DIAGNOSIS — I38 VHD (VALVULAR HEART DISEASE): Primary | ICD-10-CM

## 2024-04-26 DIAGNOSIS — R07.9 CHEST PAIN: ICD-10-CM

## 2024-04-26 DIAGNOSIS — E87.6 HYPOKALEMIA: ICD-10-CM

## 2024-04-26 LAB
ALBUMIN SERPL-MCNC: 3.3 G/DL (ref 3.4–4.8)
ALBUMIN/GLOB SERPL: 0.8 RATIO (ref 1.1–2)
ALP SERPL-CCNC: 141 UNIT/L (ref 40–150)
ALT SERPL-CCNC: 32 UNIT/L (ref 0–55)
APPEARANCE UR: CLEAR
AST SERPL-CCNC: 27 UNIT/L (ref 5–34)
BACTERIA #/AREA URNS AUTO: ABNORMAL /HPF
BASOPHILS # BLD AUTO: 0.03 X10(3)/MCL
BASOPHILS NFR BLD AUTO: 0.4 %
BILIRUB SERPL-MCNC: 1.4 MG/DL
BILIRUB UR QL STRIP.AUTO: NEGATIVE
BNP BLD-MCNC: 771.7 PG/ML
BUN SERPL-MCNC: 11.4 MG/DL (ref 8.4–25.7)
CALCIUM SERPL-MCNC: 8.8 MG/DL (ref 8.8–10)
CHLORIDE SERPL-SCNC: 108 MMOL/L (ref 98–107)
CO2 SERPL-SCNC: 21 MMOL/L (ref 23–31)
COLOR UR AUTO: ABNORMAL
CREAT SERPL-MCNC: 1.19 MG/DL (ref 0.73–1.18)
EOSINOPHIL # BLD AUTO: 0.16 X10(3)/MCL (ref 0–0.9)
EOSINOPHIL NFR BLD AUTO: 2.1 %
ERYTHROCYTE [DISTWIDTH] IN BLOOD BY AUTOMATED COUNT: 18.6 % (ref 11.5–17)
GFR SERPLBLD CREATININE-BSD FMLA CKD-EPI: >60 MLS/MIN/1.73/M2
GLOBULIN SER-MCNC: 4 GM/DL (ref 2.4–3.5)
GLUCOSE SERPL-MCNC: 105 MG/DL (ref 82–115)
GLUCOSE UR QL STRIP.AUTO: ABNORMAL
HCT VFR BLD AUTO: 29.5 % (ref 42–52)
HGB BLD-MCNC: 8.9 G/DL (ref 14–18)
IMM GRANULOCYTES # BLD AUTO: 0.02 X10(3)/MCL (ref 0–0.04)
IMM GRANULOCYTES NFR BLD AUTO: 0.3 %
KETONES UR QL STRIP.AUTO: NEGATIVE
LEUKOCYTE ESTERASE UR QL STRIP.AUTO: 500
LYMPHOCYTES # BLD AUTO: 1.96 X10(3)/MCL (ref 0.6–4.6)
LYMPHOCYTES NFR BLD AUTO: 25.2 %
MCH RBC QN AUTO: 30 PG (ref 27–31)
MCHC RBC AUTO-ENTMCNC: 30.2 G/DL (ref 33–36)
MCV RBC AUTO: 99.3 FL (ref 80–94)
MONOCYTES # BLD AUTO: 1.03 X10(3)/MCL (ref 0.1–1.3)
MONOCYTES NFR BLD AUTO: 13.2 %
NEUTROPHILS # BLD AUTO: 4.58 X10(3)/MCL (ref 2.1–9.2)
NEUTROPHILS NFR BLD AUTO: 58.8 %
NITRITE UR QL STRIP.AUTO: NEGATIVE
NRBC BLD AUTO-RTO: 0.3 %
OHS QRS DURATION: 88 MS
OHS QTC CALCULATION: 442 MS
PH UR STRIP.AUTO: 6 [PH]
PLATELET # BLD AUTO: 220 X10(3)/MCL (ref 130–400)
PMV BLD AUTO: 10.1 FL (ref 7.4–10.4)
POTASSIUM SERPL-SCNC: 3.3 MMOL/L (ref 3.5–5.1)
PROT SERPL-MCNC: 7.3 GM/DL (ref 5.8–7.6)
PROT UR QL STRIP.AUTO: ABNORMAL
RBC # BLD AUTO: 2.97 X10(6)/MCL (ref 4.7–6.1)
RBC #/AREA URNS AUTO: ABNORMAL /HPF
RBC UR QL AUTO: ABNORMAL
SODIUM SERPL-SCNC: 142 MMOL/L (ref 136–145)
SP GR UR STRIP.AUTO: >1.05 (ref 1–1.03)
SQUAMOUS #/AREA URNS LPF: ABNORMAL /HPF
UROBILINOGEN UR STRIP-ACNC: 2
WBC # SPEC AUTO: 7.78 X10(3)/MCL (ref 4.5–11.5)
WBC #/AREA URNS AUTO: >100 /HPF
WBC CLUMPS UR QL AUTO: ABNORMAL

## 2024-04-26 PROCEDURE — 27100171 HC OXYGEN HIGH FLOW UP TO 24 HOURS

## 2024-04-26 PROCEDURE — 25000003 PHARM REV CODE 250: Performed by: STUDENT IN AN ORGANIZED HEALTH CARE EDUCATION/TRAINING PROGRAM

## 2024-04-26 PROCEDURE — 87040 BLOOD CULTURE FOR BACTERIA: CPT | Performed by: PHYSICIAN ASSISTANT

## 2024-04-26 PROCEDURE — 94760 N-INVAS EAR/PLS OXIMETRY 1: CPT

## 2024-04-26 PROCEDURE — 99900035 HC TECH TIME PER 15 MIN (STAT)

## 2024-04-26 PROCEDURE — 63600175 PHARM REV CODE 636 W HCPCS: Performed by: PHYSICIAN ASSISTANT

## 2024-04-26 PROCEDURE — 63600175 PHARM REV CODE 636 W HCPCS: Performed by: EMERGENCY MEDICINE

## 2024-04-26 PROCEDURE — 85025 COMPLETE CBC W/AUTO DIFF WBC: CPT | Performed by: EMERGENCY MEDICINE

## 2024-04-26 PROCEDURE — 96375 TX/PRO/DX INJ NEW DRUG ADDON: CPT

## 2024-04-26 PROCEDURE — 94660 CPAP INITIATION&MGMT: CPT

## 2024-04-26 PROCEDURE — 63600175 PHARM REV CODE 636 W HCPCS: Performed by: NURSE PRACTITIONER

## 2024-04-26 PROCEDURE — 11000001 HC ACUTE MED/SURG PRIVATE ROOM

## 2024-04-26 PROCEDURE — 25000003 PHARM REV CODE 250: Performed by: NURSE PRACTITIONER

## 2024-04-26 PROCEDURE — 80053 COMPREHEN METABOLIC PANEL: CPT | Performed by: EMERGENCY MEDICINE

## 2024-04-26 PROCEDURE — 21400001 HC TELEMETRY ROOM

## 2024-04-26 PROCEDURE — 87070 CULTURE OTHR SPECIMN AEROBIC: CPT | Performed by: STUDENT IN AN ORGANIZED HEALTH CARE EDUCATION/TRAINING PROGRAM

## 2024-04-26 PROCEDURE — 87086 URINE CULTURE/COLONY COUNT: CPT | Performed by: EMERGENCY MEDICINE

## 2024-04-26 PROCEDURE — 5A09357 ASSISTANCE WITH RESPIRATORY VENTILATION, LESS THAN 24 CONSECUTIVE HOURS, CONTINUOUS POSITIVE AIRWAY PRESSURE: ICD-10-PCS | Performed by: STUDENT IN AN ORGANIZED HEALTH CARE EDUCATION/TRAINING PROGRAM

## 2024-04-26 PROCEDURE — 25000003 PHARM REV CODE 250: Performed by: EMERGENCY MEDICINE

## 2024-04-26 PROCEDURE — 99900031 HC PATIENT EDUCATION (STAT)

## 2024-04-26 PROCEDURE — 93010 ELECTROCARDIOGRAM REPORT: CPT | Mod: ,,, | Performed by: INTERNAL MEDICINE

## 2024-04-26 PROCEDURE — 81001 URINALYSIS AUTO W/SCOPE: CPT | Performed by: EMERGENCY MEDICINE

## 2024-04-26 PROCEDURE — 27000190 HC CPAP FULL FACE MASK W/VALVE

## 2024-04-26 PROCEDURE — 25000003 PHARM REV CODE 250: Performed by: INTERNAL MEDICINE

## 2024-04-26 PROCEDURE — 63600175 PHARM REV CODE 636 W HCPCS: Performed by: INTERNAL MEDICINE

## 2024-04-26 PROCEDURE — 93005 ELECTROCARDIOGRAM TRACING: CPT

## 2024-04-26 PROCEDURE — 25500020 PHARM REV CODE 255: Performed by: EMERGENCY MEDICINE

## 2024-04-26 PROCEDURE — 83880 ASSAY OF NATRIURETIC PEPTIDE: CPT | Performed by: STUDENT IN AN ORGANIZED HEALTH CARE EDUCATION/TRAINING PROGRAM

## 2024-04-26 PROCEDURE — 96365 THER/PROPH/DIAG IV INF INIT: CPT

## 2024-04-26 RX ORDER — ASPIRIN 81 MG/1
81 TABLET ORAL DAILY
Status: DISCONTINUED | OUTPATIENT
Start: 2024-04-27 | End: 2024-04-30 | Stop reason: HOSPADM

## 2024-04-26 RX ORDER — SIMETHICONE 80 MG
1 TABLET,CHEWABLE ORAL 4 TIMES DAILY PRN
Status: DISCONTINUED | OUTPATIENT
Start: 2024-04-26 | End: 2024-04-30 | Stop reason: HOSPADM

## 2024-04-26 RX ORDER — ONDANSETRON HYDROCHLORIDE 2 MG/ML
4 INJECTION, SOLUTION INTRAVENOUS
Status: COMPLETED | OUTPATIENT
Start: 2024-04-26 | End: 2024-04-26

## 2024-04-26 RX ORDER — SODIUM CHLORIDE, SODIUM LACTATE, POTASSIUM CHLORIDE, CALCIUM CHLORIDE 600; 310; 30; 20 MG/100ML; MG/100ML; MG/100ML; MG/100ML
INJECTION, SOLUTION INTRAVENOUS CONTINUOUS
Status: DISCONTINUED | OUTPATIENT
Start: 2024-04-26 | End: 2024-04-26

## 2024-04-26 RX ORDER — ONDANSETRON HYDROCHLORIDE 2 MG/ML
4 INJECTION, SOLUTION INTRAVENOUS EVERY 4 HOURS PRN
Status: DISCONTINUED | OUTPATIENT
Start: 2024-04-26 | End: 2024-04-30 | Stop reason: HOSPADM

## 2024-04-26 RX ORDER — ALUMINUM HYDROXIDE, MAGNESIUM HYDROXIDE, AND SIMETHICONE 1200; 120; 1200 MG/30ML; MG/30ML; MG/30ML
30 SUSPENSION ORAL 4 TIMES DAILY PRN
Status: DISCONTINUED | OUTPATIENT
Start: 2024-04-26 | End: 2024-04-30 | Stop reason: HOSPADM

## 2024-04-26 RX ORDER — FUROSEMIDE 10 MG/ML
40 INJECTION INTRAMUSCULAR; INTRAVENOUS EVERY 12 HOURS
Status: DISCONTINUED | OUTPATIENT
Start: 2024-04-26 | End: 2024-04-28

## 2024-04-26 RX ORDER — HYDROXYZINE HYDROCHLORIDE 25 MG/1
50 TABLET, FILM COATED ORAL 2 TIMES DAILY PRN
Status: DISCONTINUED | OUTPATIENT
Start: 2024-04-26 | End: 2024-04-30 | Stop reason: HOSPADM

## 2024-04-26 RX ORDER — FOLIC ACID 1 MG/1
1 TABLET ORAL DAILY
Status: DISCONTINUED | OUTPATIENT
Start: 2024-04-27 | End: 2024-04-30 | Stop reason: HOSPADM

## 2024-04-26 RX ORDER — POTASSIUM CHLORIDE 20 MEQ/1
40 TABLET, EXTENDED RELEASE ORAL ONCE
Status: COMPLETED | OUTPATIENT
Start: 2024-04-26 | End: 2024-04-26

## 2024-04-26 RX ORDER — MORPHINE SULFATE 4 MG/ML
4 INJECTION, SOLUTION INTRAMUSCULAR; INTRAVENOUS
Status: COMPLETED | OUTPATIENT
Start: 2024-04-26 | End: 2024-04-26

## 2024-04-26 RX ORDER — LEVOFLOXACIN 5 MG/ML
750 INJECTION, SOLUTION INTRAVENOUS
Status: DISCONTINUED | OUTPATIENT
Start: 2024-04-26 | End: 2024-04-26

## 2024-04-26 RX ORDER — ACETAMINOPHEN 325 MG/1
650 TABLET ORAL EVERY 6 HOURS PRN
Status: DISCONTINUED | OUTPATIENT
Start: 2024-04-26 | End: 2024-04-30 | Stop reason: HOSPADM

## 2024-04-26 RX ORDER — PANTOPRAZOLE SODIUM 40 MG/1
40 TABLET, DELAYED RELEASE ORAL 2 TIMES DAILY
Status: DISCONTINUED | OUTPATIENT
Start: 2024-04-26 | End: 2024-04-30 | Stop reason: HOSPADM

## 2024-04-26 RX ORDER — POLYETHYLENE GLYCOL 3350 17 G/17G
17 POWDER, FOR SOLUTION ORAL 2 TIMES DAILY PRN
Status: DISCONTINUED | OUTPATIENT
Start: 2024-04-26 | End: 2024-04-30 | Stop reason: HOSPADM

## 2024-04-26 RX ORDER — VANCOMYCIN HCL IN 5 % DEXTROSE 1G/250ML
1000 PLASTIC BAG, INJECTION (ML) INTRAVENOUS
Status: DISCONTINUED | OUTPATIENT
Start: 2024-04-26 | End: 2024-04-27

## 2024-04-26 RX ORDER — PROCHLORPERAZINE EDISYLATE 5 MG/ML
5 INJECTION INTRAMUSCULAR; INTRAVENOUS EVERY 6 HOURS PRN
Status: DISCONTINUED | OUTPATIENT
Start: 2024-04-26 | End: 2024-04-30 | Stop reason: HOSPADM

## 2024-04-26 RX ORDER — FUROSEMIDE 10 MG/ML
40 INJECTION INTRAMUSCULAR; INTRAVENOUS
Status: COMPLETED | OUTPATIENT
Start: 2024-04-26 | End: 2024-04-26

## 2024-04-26 RX ORDER — ENOXAPARIN SODIUM 100 MG/ML
40 INJECTION SUBCUTANEOUS EVERY 24 HOURS
Status: DISCONTINUED | OUTPATIENT
Start: 2024-04-26 | End: 2024-04-26

## 2024-04-26 RX ORDER — ATORVASTATIN CALCIUM 40 MG/1
80 TABLET, FILM COATED ORAL DAILY
Status: DISCONTINUED | OUTPATIENT
Start: 2024-04-27 | End: 2024-04-30 | Stop reason: HOSPADM

## 2024-04-26 RX ORDER — NALOXONE HCL 0.4 MG/ML
0.02 VIAL (ML) INJECTION
Status: DISCONTINUED | OUTPATIENT
Start: 2024-04-26 | End: 2024-04-30 | Stop reason: HOSPADM

## 2024-04-26 RX ORDER — ALPRAZOLAM 0.25 MG/1
0.25 TABLET ORAL 3 TIMES DAILY PRN
Status: DISCONTINUED | OUTPATIENT
Start: 2024-04-26 | End: 2024-04-30 | Stop reason: HOSPADM

## 2024-04-26 RX ORDER — MIDODRINE HYDROCHLORIDE 5 MG/1
10 TABLET ORAL
Status: DISCONTINUED | OUTPATIENT
Start: 2024-04-27 | End: 2024-04-30 | Stop reason: HOSPADM

## 2024-04-26 RX ORDER — TALC
6 POWDER (GRAM) TOPICAL NIGHTLY PRN
Status: DISCONTINUED | OUTPATIENT
Start: 2024-04-26 | End: 2024-04-30 | Stop reason: HOSPADM

## 2024-04-26 RX ADMIN — IOHEXOL 100 ML: 350 INJECTION, SOLUTION INTRAVENOUS at 03:04

## 2024-04-26 RX ADMIN — VANCOMYCIN HYDROCHLORIDE 750 MG: 750 INJECTION, POWDER, LYOPHILIZED, FOR SOLUTION INTRAVENOUS at 10:04

## 2024-04-26 RX ADMIN — MORPHINE SULFATE 4 MG: 4 INJECTION INTRAVENOUS at 02:04

## 2024-04-26 RX ADMIN — LEVOFLOXACIN 750 MG: 750 INJECTION, SOLUTION INTRAVENOUS at 05:04

## 2024-04-26 RX ADMIN — VANCOMYCIN HYDROCHLORIDE 1000 MG: 1 INJECTION, POWDER, LYOPHILIZED, FOR SOLUTION INTRAVENOUS at 08:04

## 2024-04-26 RX ADMIN — FUROSEMIDE 40 MG: 10 INJECTION, SOLUTION INTRAMUSCULAR; INTRAVENOUS at 08:04

## 2024-04-26 RX ADMIN — ENOXAPARIN SODIUM 40 MG: 40 INJECTION SUBCUTANEOUS at 05:04

## 2024-04-26 RX ADMIN — PANTOPRAZOLE SODIUM 40 MG: 40 TABLET, DELAYED RELEASE ORAL at 08:04

## 2024-04-26 RX ADMIN — PIPERACILLIN SODIUM AND TAZOBACTAM SODIUM 4.5 G: 4; .5 INJECTION, POWDER, LYOPHILIZED, FOR SOLUTION INTRAVENOUS at 04:04

## 2024-04-26 RX ADMIN — DEXTROSE MONOHYDRATE 1250 MG: 50 INJECTION, SOLUTION INTRAVENOUS at 05:04

## 2024-04-26 RX ADMIN — PIPERACILLIN SODIUM AND TAZOBACTAM SODIUM 4.5 G: 4; .5 INJECTION, POWDER, LYOPHILIZED, FOR SOLUTION INTRAVENOUS at 10:04

## 2024-04-26 RX ADMIN — PIPERACILLIN SODIUM AND TAZOBACTAM SODIUM 4.5 G: 4; .5 INJECTION, POWDER, LYOPHILIZED, FOR SOLUTION INTRAVENOUS at 01:04

## 2024-04-26 RX ADMIN — ONDANSETRON 4 MG: 2 INJECTION INTRAMUSCULAR; INTRAVENOUS at 02:04

## 2024-04-26 RX ADMIN — POTASSIUM CHLORIDE 40 MEQ: 1500 TABLET, EXTENDED RELEASE ORAL at 08:04

## 2024-04-26 RX ADMIN — FUROSEMIDE 40 MG: 10 INJECTION, SOLUTION INTRAMUSCULAR; INTRAVENOUS at 09:04

## 2024-04-26 RX ADMIN — ACETAMINOPHEN 650 MG: 325 TABLET, FILM COATED ORAL at 08:04

## 2024-04-26 RX ADMIN — POTASSIUM BICARBONATE 25 MEQ: 977.5 TABLET, EFFERVESCENT ORAL at 03:04

## 2024-04-26 NOTE — ED PROVIDER NOTES
"Encounter Date: 4/25/2024       History     Chief Complaint   Patient presents with    Groin Swelling     C/o right groin pain. Recent ,left "groin surgery" and pain with bleeding x 30 min ago. Specified that it was scrotal surgery. Pt with pain and "bleeding" otnight to right scrotum. Small amount of bleeding/pain to scrotum at home. Surgery in March got hypotensive and coded, 2 weeks in ICU     68-year-old male presents ED for evaluation abdominal pain.  He noticed some scant bleeding from the right hemiscrotum prior to arrival and began having pain that wraps around his lower abdomen that feels aching without nausea, vomiting, diarrhea or dysuria.  Pain is constant and not worsened or alleviated by anything.    The history is provided by the patient. No  was used.     Review of patient's allergies indicates:  No Known Allergies  Past Medical History:   Diagnosis Date    A-fib     Anticoagulant long-term use     Aortic aneurysm     Cataract     CHF (congestive heart failure)     Chronic atrial fibrillation     COPD (chronic obstructive pulmonary disease)     Coronary artery disease     HLD (hyperlipidemia)     Hypertension     Mitral regurgitation     PAD (peripheral artery disease)     Primary open angle glaucoma (POAG)      Past Surgical History:   Procedure Laterality Date    ANGIOGRAM, CORONARY, WITH LEFT HEART CATHETERIZATION N/A 3/26/2024    Procedure: Angiogram, Coronary, with Left Heart Cath;  Surgeon: Adriano Montiel MD;  Location: [facility] CATH LAB;  Service: Cardiology;  Laterality: N/A;    ATHERECTOMY OF PERIPHERAL VESSEL Left 09/12/2022    LEFT SFA ATHERECTOMY, BALLOON ANGIOPLASTY    CATARACT EXTRACTION W/  INTRAOCULAR LENS IMPLANT Left 3/9/2023    Procedure: EXTRACTION, CATARACT, WITH IOL INSERTION;  Surgeon: Georgi Borja MD;  Location: [facility];  Service: Ophthalmology;  Laterality: Left;  19.5  mac    EGD, WITH CLOSED BIOPSY  3/22/2024    Procedure: EGD, WITH CLOSED BIOPSY;  " Surgeon: Ronald Calderon MD;  Location: [facility] ENDOSCOPY;  Service: Gastroenterology;;    ESOPHAGOGASTRODUODENOSCOPY N/A 3/22/2024    Procedure: EGD;  Surgeon: Ronald Calderon MD;  Location: [facility] ENDOSCOPY;  Service: Gastroenterology;  Laterality: N/A;    Heart Stent N/A     > 10yrs. AGO    INCISION AND DRAINAGE N/A 3/18/2024    Procedure: Incision and Drainage;  Surgeon: Fahad Rivas MD;  Location: [facility];  Service: Urology;  Laterality: N/A;  I&D SCROTAL ABSCESS    INSERTION OF STENT INTO PERIPHERAL VESSEL Right 10/17/2022    RIGHT SFA ATHERECTOMY, BALLOON ANGIOPLASTY, STENT; RIGHT ANTERIOR TIBIAL ARTERY ATHERECTOMY, BALLOON ANGIOPLASTY    ORCHIECTOMY Left 3/18/2024    Procedure: ORCHIECTOMY;  Surgeon: Fahad Rivas MD;  Location: [facility];  Service: Urology;  Laterality: Left;     Family History   Problem Relation Name Age of Onset    Cancer Mother      Hypertension Mother      Heart failure Father      Stroke Father      Hypertension Father      No Known Problems Sister       Social History     Tobacco Use    Smoking status: Every Day     Current packs/day: 0.25     Average packs/day: 0.3 packs/day for 40.0 years (10.0 ttl pk-yrs)     Types: Cigarettes     Passive exposure: Current    Smokeless tobacco: Never   Substance Use Topics    Alcohol use: Yes     Alcohol/week: 3.0 standard drinks of alcohol     Types: 3 Cans of beer per week     Comment: socially    Drug use: Yes     Frequency: 1.0 times per week     Types: Marijuana     Comment: no use in over 2 months     Review of Systems    Physical Exam     Initial Vitals [04/25/24 2357]   BP Pulse Resp Temp SpO2   124/66 66 15 98.8 °F (37.1 °C) 95 %      MAP       --         Physical Exam    Nursing note and vitals reviewed.  Constitutional: He appears well-developed and well-nourished. He is not diaphoretic. No distress.   HENT:   Head: Normocephalic and atraumatic.   Nose: Nose normal.   Mouth/Throat: Oropharynx is clear  and moist.   Eyes: Conjunctivae and EOM are normal. Pupils are equal, round, and reactive to light.   Neck: Trachea normal. Neck supple.   Normal range of motion.  Cardiovascular:  Normal rate, regular rhythm, normal heart sounds and intact distal pulses.     Exam reveals no gallop and no friction rub.       No murmur heard.  Pulmonary/Chest: No respiratory distress. He has no wheezes. He has no rhonchi. He has rales. He exhibits no tenderness.   Tachypneic, crackles on right   Abdominal: Abdomen is soft. Bowel sounds are normal. He exhibits no distension and no mass. There is no abdominal tenderness.   Abdomen is nontender to palpation There is no rebound.   Genitourinary:    Genitourinary Comments: Testicular exam performed with Carlos BRAGG as chaperone.  Wound appears to be healing and not acutely infected to the left side of the scrotum scant punctate areas of bleeding from the right     Musculoskeletal:         General: No tenderness or edema. Normal range of motion.      Cervical back: Normal range of motion and neck supple.      Lumbar back: Normal. No tenderness. Normal range of motion.     Neurological: He is alert and oriented to person, place, and time. He has normal strength. No cranial nerve deficit or sensory deficit.   Skin: Skin is warm and dry. Capillary refill takes less than 2 seconds. No rash and no abscess noted. No erythema. No pallor.   Psychiatric: He has a normal mood and affect. His behavior is normal. Judgment and thought content normal.         ED Course   Critical Care    Date/Time: 4/26/2024 4:37 AM    Performed by: Jeanette Elizabeth MD  Authorized by: Jeanette Elizabeth MD  Direct patient critical care time: 35 minutes  Total critical care time (exclusive of procedural time) : 35 minutes  Critical care was necessary to treat or prevent imminent or life-threatening deterioration of the following conditions: respiratory failure.  Critical care was time spent personally by me on the  following activities: development of treatment plan with patient or surrogate, discussions with consultants, evaluation of patient's response to treatment, examination of patient, obtaining history from patient or surrogate, ordering and performing treatments and interventions, ordering and review of laboratory studies, ordering and review of radiographic studies, re-evaluation of patient's condition, pulse oximetry and review of old charts.        Labs Reviewed   COMPREHENSIVE METABOLIC PANEL - Abnormal; Notable for the following components:       Result Value    Potassium Level 3.3 (*)     Chloride 108 (*)     Carbon Dioxide 21 (*)     Creatinine 1.19 (*)     Albumin Level 3.3 (*)     Globulin 4.0 (*)     Albumin/Globulin Ratio 0.8 (*)     All other components within normal limits   CBC WITH DIFFERENTIAL - Abnormal; Notable for the following components:    RBC 2.97 (*)     Hgb 8.9 (*)     Hct 29.5 (*)     MCV 99.3 (*)     MCHC 30.2 (*)     RDW 18.6 (*)     All other components within normal limits   CBC W/ AUTO DIFFERENTIAL    Narrative:     The following orders were created for panel order CBC auto differential.  Procedure                               Abnormality         Status                     ---------                               -----------         ------                     CBC with Differential[[id number]]       Abnormal            Final result                 Please view results for these tests on the individual orders.   URINALYSIS, REFLEX TO URINE CULTURE          Imaging Results              CT Abdomen Pelvis With IV Contrast NO Oral Contrast (Preliminary result)  Result time 04/26/24 03:42:25      Preliminary result by Gilberto Graff MD (04/26/24 03:42:25)                   Narrative:    START OF REPORT:  Technique: CT of the abdomen and pelvis was performed with axial images as well as sagittal and coronal reconstruction images with intravenous contrast.    Comparison: Comparison is with CT  study dated 2024-03-19 06:02:51 and USG study [id number] 01:54:33.    Clinical History: C/o right groin pain. Recent ,left groin surgery and pain with bleeding x 30 min ago. Specified that it was scrotal surgery. Pt with pain and bleeding otnight to right scrotum. Small amount of bleeding/pain to scrotum at home.    Dosage Information: Automated Exposure Control was utilized.    Findings:  Lines and Tubes: There is interval removal of nasogastric catheter.  Thorax:  Lungs: The lungs are slightly heterogeneous, which may reflect small airways disease versus mosaic attenuation.  Pleura: There is interval resolution of previously noted small bilateral pleural effusions. Ill-defined ground-glass opacities are seen in the right lower lobe (series 2, image 29), which may reflect pulmonary infiltrates.  Heart: The heart size is within normal limits.  Liver: The liver appears unremarkable.  Biliary System: No intrahepatic or extrahepatic biliary duct dilatation is seen.  Gallbladder: The gallbladder appears unremarkable.  Pancreas: The pancreas appears unremarkable.  Spleen: The spleen appears unremarkable.  Adrenals: The adrenal glands appear unremarkable.  Kidneys: Again noted is focal renal parenchymal scarring bilaterally. There is interval resolution of previously noted bilateral perinephric fat stranding. The kidneys otherwise appear unremarkable.  Aorta: There is moderate calcification of the abdominal aorta and its branches. There is a partially thrombosed saccular aneurysm arising from the right anterior aspect of the infrarenal abdominal aorta (series 2, image 84 and series 5, image 71), which measures 1.9 x 2.4 x 3.3 cm (AP x T x CC). Similar findings are also noted on the prior examination.  IVC: Unremarkable.  Bowel:  Esophagus: The visualized esophagus appears unremarkable.  Stomach: The stomach appears unremarkable.  Duodenum: Unremarkable appearing duodenum.  Small Bowel: The small bowel appears  unremarkable.  Colon: Nondistended.  Appendix: The appendix appears unremarkable.  Peritoneum: No intraperitoneal free air or ascites is seen.    Pelvis:  Bladder: The bladder is nondistended however the bladder wall appears thickened after considering state of nondistension which could reflect an element of cystitis. There is interval removal of previously noted booth catheter.  Male: There is left orchiectomy. There is diffuse subcutaneous edema of the right scrotal wall bilaterally, which may reflect mild cellulitis. Similar findings are also noted on the prior ultrasound examination.  Prostate gland: The prostate gland appears unremarkable. There are calcifications in the prostate gland. There is left orchiectomy. There is diffuse subcutaneous edema of the right scrotal wall bilaterally, which may reflect mild cellulitis.  Inguinal Findings:  Inguinal Hernia: Incidental note is made of small uncomplicated mesenteric fat containing bilateral inguinal hernias.    Bony structures:  Dorsal Spine: There is mild spondylosis of the visualized dorsal spine.  Bony Pelvis: The visualized bony structures of the pelvis appear unremarkable.      Impression:  1. The bladder is nondistended however the bladder wall appears thickened after considering state of nondistension which could reflect an element of cystitis. Correlate with clinical and laboratory findings. There is interval removal of previously noted booth catheter.  2. There is a partially thrombosed saccular aneurysm arising from the right anterior aspect of the infrarenal abdominal aorta (series 2, image 84 and series 5, image 71), which measures 1.9 x 2.4 x 3.3 cm (AP x T x CC). Similar findings are also noted on the prior examination.  3. There is left orchiectomy. There is diffuse subcutaneous edema of the right scrotal wall bilaterally, which may reflect mild cellulitis. Similar findings are also noted on the prior ultrasound examination.  4. There is interval  resolution of previously noted small bilateral pleural effusions. Ill-defined ground-glass opacities are seen in the right lower lobe (series 2, image 29), which may reflect pulmonary infiltrates.  5. Details and other findings as discussed above.                                         US Scrotum And Testicles (Preliminary result)  Result time 04/26/24 02:45:09      Preliminary result by Gilberto Graff MD (04/26/24 02:45:09)                   Narrative:    START OF REPORT:  Technique: Real time grey scale and color doppler ultrasound was performed on the scrotum and contents.    Comparison: None.    Clinical history: Groin pain.    Findings:  Scrotum:  Right scrotal soft tissues: The right scrotal wall is thickened, measures 1.2 cm in thickness.  Right testicle: The right testicle measures 3 x 1.9 x 2.4 cm and demonstrates heterogeneous echotexture. However, no definite mass is seen.  Blood flow:  Intratesticular arterial blood flow: Within normal limits.  Intratesticular venous blood flow: Within normal limits.  Right epididymis: The epididymis is normal in size; however, demonstrates increased echotexture. No vascularity is seen in the right epididymis to suggest epidydimitis.  Right hydrocele: None.  Right varicocele: There are varicoceles at the lateral and posterior aspect of the right testicle, which measure 3 mm and 5 mm in diameter respectively.  Left testicle: The left testicle is not visualized, likely surgically absent.      Impression:  1. The right testicle measures 3 x 1.9 x 2.4 cm and demonstrates heterogeneous echotexture. However, no definite mass is seen.  2. The right scrotal wall is thickened, measures 1.2 cm in thickness.  3. There are varicoceles at the lateral and posterior aspect of the right testicle, which measure 3 mm and 5 mm in diameter respectively.  4. No specific evidence of testicular torsion. Details as above.                                         Medications   vancomycin 1.25  g in dextrose 5% 250 mL IVPB (ready to mix) (has no administration in time range)   piperacillin-tazobactam (ZOSYN) 4.5 g in dextrose 5 % in water (D5W) 100 mL IVPB (MB+) (has no administration in time range)   levoFLOXacin 750 mg/150 mL IVPB 750 mg (has no administration in time range)   ondansetron injection 4 mg (4 mg Intravenous Given 4/26/24 0255)   morphine injection 4 mg (4 mg Intravenous Given 4/26/24 0255)   iohexoL (OMNIPAQUE 350) injection 100 mL (100 mLs Intravenous Given 4/26/24 0314)   potassium bicarbonate disintegrating tablet 25 mEq (25 mEq Oral Given 4/26/24 0355)     Medical Decision Making  Given patient's presentation, differential diagnosis includes but is not limited to testicular torsion, cellulitis, wound dehiscence, UTI, small-bowel obstruction, other intra-abdominal injury, pneumonia  To evaluate these  possible etiologies CBC, CMP, urinalysis, testicular ultrasound and CT abdomen and pelvis with IV contrast were ordered and reviewed  Patient was stable anemia and mild hypokalemia ultrasound without gross abnormality other than some skin thickening and CT showing some edema and possible cellulitis however clinically patient does not have cellulitis.  He has developed a right lower lobe infiltrate and was admitted with IV antibiotics and ventilated within the past month therefore covered for hospital-acquired pneumonia will be admitted to hospitalist.  He was requiring additional supplemental oxygen above his standard 2 L nasal cannula      Problems Addressed:  Acute on chronic respiratory failure with hypoxia: acute illness or injury that poses a threat to life or bodily functions  Chronic anemia: chronic illness or injury  Hospital-acquired pneumonia: acute illness or injury that poses a threat to life or bodily functions  Hypokalemia: acute illness or injury that poses a threat to life or bodily functions  Pain: acute illness or injury that poses a threat to life or bodily  functions  Suprapubic abdominal pain: acute illness or injury that poses a threat to life or bodily functions    Amount and/or Complexity of Data Reviewed  External Data Reviewed: labs, radiology and notes.     Details: No previous respiratory culture other than some yeast  Labs: ordered.  Radiology: ordered.    Risk  OTC drugs.  Prescription drug management.  Parenteral controlled substances.  Decision regarding hospitalization.    Critical Care  Total time providing critical care: 35 minutes               ED Course as of 04/26/24 0439   Fri Apr 26, 2024   7117 Pain improved, endorses cougha nd increased shortness of breath, has been on 2lpm nc since dc from hospital, had dot increase to 3 [BS]   0420 Patient reports that he does not feel like his groin is anymore swollen than it has been [BS]      ED Course User Index  [BS] Jeanette Elizabeth MD                           Clinical Impression:  Final diagnoses:  [R52] Pain  [E87.6] Hypokalemia (Primary)  [D64.9] Chronic anemia  [R10.2] Suprapubic abdominal pain  [J18.9, Y95] Hospital-acquired pneumonia  [J96.21] Acute on chronic respiratory failure with hypoxia                 Jeanette Elizabeth MD  04/26/24 0431

## 2024-04-26 NOTE — NURSING
Nurses Note -- 4 Eyes      4/26/2024   3:38 PM      Skin assessed during: Admit      [] No Altered Skin Integrity Present    []Prevention Measures Documented      [x] Yes- Altered Skin Integrity Present or Discovered   [x] LDA Added if Not in Epic (Describe Wound)   [x] New Altered Skin Integrity was Present on Admit and Documented in LDA   [x] Wound Image Taken    Wound Care Consulted? Yes    Attending Nurse:  Kalee Au RN    Second RN/Staff Member:   Mercedez Keita RN

## 2024-04-26 NOTE — H&P
"[facility] Hospital Medicine History & Physical Examination       Patient Name: Ran Reyes Jr.  MRN: [id number]  Patient Class: IP- Inpatient   Admission Date: 4/26/2024   Admitting Physician: Harris Hernandez MD   Length of Stay: 0  Attending Physician: Reema Mari DO  Primary Care Provider: Abiodun Recinos DO  Face-to-Face encounter date: 04/26/2024  Code Status: Full Code  Chief Complaint: Groin Swelling (C/o right groin pain. Recent ,left "groin surgery" and pain with bleeding x 30 min ago. Specified that it was scrotal surgery. Pt with pain and "bleeding" otnight to right scrotum. Small amount of bleeding/pain to scrotum at home. Surgery in March got hypotensive and coded, 2 weeks in ICU)        Patient information was obtained from patient, patient's family, past medical records and ER records.     HISTORY OF PRESENT ILLNESS:   Ran Reyes Jr. is a 68 y.o. Black or  male with a past medical history of hypertension, hyperlipidemia, atrial fibrillation on Xarelto, valvular heart disease, coronary artery disease status post stents, peripheral arterial disease with stent, congestive heart failure, aortic aneurysm, COPD L O2 at home and glaucoma.  Patient was recent admission to hospital medicine services from 03/17/2024 to 04/12/2024 for sepsis secondary to a scrotal abscess with component of early Claudine's with exploration, I and D and left orchectomy on 03/18/2024.  Hospital stay was complicated as patient had a cardiac arrest with ventricular tachycardia requiring defibrillation x3 on 03/18/2024 and developed a GI bleed. The patient presented to [facility] on 4/26/2024 with a primary complaint of bleeding from the right scrotum and abdominal pain which started today.  Patient reports having pain across the bilateral lower extremities in bilateral groin wrapping to the back.  Associated symptoms include shortness a breath, cough with intermittent " blood-tinged sputum, chills.  He denies complaints of chest pain, nausea, vomiting and diarrhea.    Upon presentation to the ED, temperature 98.8° F, heart rate 66, blood pressure 124/66, respiratory rate 15 and SpO2 95% on room air. Labs with H&H 8.9/29.5, MCV 99.3, potassium 3.3, BUN/creatinine 11.4/1.19 (14.7/0.98 on 04/19/2024).  UA with trace squamous epithelial cells, few WBC clumps, occasional bacteria, greater than 100 WBCs, 21-50 RBCs, 500 leukocyte esterase, 2+ urobilinogen, 2+ blood, 4+ glucose and trace protein.  Ultrasound of the scrotum/testis revealed mild heterogeneous appearance of the right testicle with blood flow demonstrated, absent left testicle, right varicocele, scrotal edema.  CT abdomen and pelvis with severe scrotal wall thickening suggesting cellulitis, appears to be a hydrocele on the left with no organized fluid collection to suggest abscess, mild bladder wall thickening some areas of haziness along the posterior bladder, cystitis is suspected, saccular abdominal aortic aneurysm of 3.8 cm which is stable an area of subsegmental infiltrate to the right lung base with mild interstitial edema.  In ED patient received Zosyn, vancomycin, 25 mEq of potassium chloride carbonate, Zofran and morphine.  Patient oxygen saturation decreased and he was placed on nasal cannula with titration up to OxyMask and non-rebreather at 15 L. RRT was called and patient was placed on BiPAP. Patient is admitted to hospital medicine services for further medical management.    PAST MEDICAL HISTORY:     Past Medical History:   Diagnosis Date    A-fib     Anticoagulant long-term use     Aortic aneurysm     Cataract     CHF (congestive heart failure)     Chronic atrial fibrillation     COPD (chronic obstructive pulmonary disease)     Coronary artery disease     HLD (hyperlipidemia)     Hypertension     Mitral regurgitation     PAD (peripheral artery disease)     Primary open angle glaucoma (POAG)        PAST SURGICAL  HISTORY:     Past Surgical History:   Procedure Laterality Date    ANGIOGRAM, CORONARY, WITH LEFT HEART CATHETERIZATION N/A 3/26/2024    Procedure: Angiogram, Coronary, with Left Heart Cath;  Surgeon: Adriano Montiel MD;  Location: [facility] CATH LAB;  Service: Cardiology;  Laterality: N/A;    ATHERECTOMY OF PERIPHERAL VESSEL Left 09/12/2022    LEFT SFA ATHERECTOMY, BALLOON ANGIOPLASTY    CATARACT EXTRACTION W/  INTRAOCULAR LENS IMPLANT Left 3/9/2023    Procedure: EXTRACTION, CATARACT, WITH IOL INSERTION;  Surgeon: Georgi Borja MD;  Location: [facility];  Service: Ophthalmology;  Laterality: Left;  19.5  mac    EGD, WITH CLOSED BIOPSY  3/22/2024    Procedure: EGD, WITH CLOSED BIOPSY;  Surgeon: Ronald Calderon MD;  Location: [facility] ENDOSCOPY;  Service: Gastroenterology;;    ESOPHAGOGASTRODUODENOSCOPY N/A 3/22/2024    Procedure: EGD;  Surgeon: Ronald Calderon MD;  Location: [facility] ENDOSCOPY;  Service: Gastroenterology;  Laterality: N/A;    Heart Stent N/A     > 10yrs. AGO    INCISION AND DRAINAGE N/A 3/18/2024    Procedure: Incision and Drainage;  Surgeon: Fahad Rivas MD;  Location: [facility];  Service: Urology;  Laterality: N/A;  I&D SCROTAL ABSCESS    INSERTION OF STENT INTO PERIPHERAL VESSEL Right 10/17/2022    RIGHT SFA ATHERECTOMY, BALLOON ANGIOPLASTY, STENT; RIGHT ANTERIOR TIBIAL ARTERY ATHERECTOMY, BALLOON ANGIOPLASTY    ORCHIECTOMY Left 3/18/2024    Procedure: ORCHIECTOMY;  Surgeon: Fahad Rivas MD;  Location: [facility];  Service: Urology;  Laterality: Left;       ALLERGIES:   Patient has no known allergies.    FAMILY HISTORY:   Father: Myocardial infarction    SOCIAL HISTORY:     Social History     Tobacco Use    Smoking status: Every Day     Current packs/day: 0.25     Average packs/day: 0.3 packs/day for 40.0 years (10.0 ttl pk-yrs)     Types: Cigarettes     Passive exposure: Current    Smokeless tobacco: Never   Substance Use Topics    Alcohol use: Yes     Alcohol/week: 3.0  standard drinks of alcohol     Types: 3 Cans of beer per week     Comment: socially   Denies illicit drug use     HOME MEDICATIONS:     Prior to Admission medications    Medication Sig Start Date End Date Taking? Authorizing Provider   ALPRAZolam (XANAX) 0.25 MG tablet Take 1 tablet (0.25 mg total) by mouth 3 (three) times daily as needed for Anxiety. 4/9/24 4/26/24 Yes Tha Edmond MD   aspirin (ECOTRIN) 81 MG EC tablet Take 1 tablet (81 mg total) by mouth once daily. 4/9/24 4/9/25 Yes Tha Edmond MD   atorvastatin (LIPITOR) 80 MG tablet Take 1 tablet (80 mg total) by mouth once daily. 4/9/24 4/9/25 Yes Tha Edmond MD   empagliflozin (JARDIANCE) 10 mg tablet Take 1 tablet (10 mg total) by mouth once daily. 1/31/24  Yes Dayanna Johnson MD   folic acid (FOLVITE) 1 MG tablet Take 1 tablet (1 mg total) by mouth once daily. 4/9/24 4/9/25 Yes Tha Edmond MD   furosemide (LASIX) 40 MG tablet Take 1 tablet (40 mg total) by mouth 2 (two) times a day. 4/23/24 4/23/25 Yes Merle Sun MD   hydrOXYzine HCL (ATARAX) 10 MG Tab Take 50 mg by mouth 2 (two) times daily as needed.   Yes Provider, Historical   metoprolol succinate (TOPROL-XL) 25 MG 24 hr tablet Take 0.5 tablets (12.5 mg total) by mouth once daily. 4/9/24 4/9/25 Yes Tha Edmond MD   pantoprazole (PROTONIX) 40 MG tablet Take 1 tablet (40 mg total) by mouth 2 (two) times a day. 4/12/24 5/12/24 Yes Giana Garcia MD   rivaroxaban (XARELTO) 20 mg Tab Take 20 mg by mouth. 12/30/21  Yes Provider, Historical   spironolactone (ALDACTONE) 25 MG tablet Take 1 tablet (25 mg total) by mouth once daily. 1/31/24  Yes Dayanna Johnson MD   cetirizine (ZYRTEC) 10 MG tablet Take 1 tablet (10 mg total) by mouth once daily. for 14 days 10/23/23 4/16/24  Jerald Rocha Jr., P   midodrine (PROAMATINE) 10 MG tablet Take 1 tablet (10 mg total) by mouth 3 (three) times daily with meals. 4/9/24 4/9/25  Tha Edmond MD   triamcinolone acetonide 0.1% (KENALOG) 0.1  % cream Apply topically Daily.    Provider, Historical       REVIEW OF SYSTEMS:   Except as documented, all other systems reviewed and negative     PHYSICAL EXAM:     VITAL SIGNS: 24 HRS MIN & MAX LAST   Temp  Min: 98.3 °F (36.8 °C)  Max: 98.8 °F (37.1 °C) 98.3 °F (36.8 °C)   BP  Min: 103/63  Max: 124/85 124/85   Pulse  Min: 66  Max: 96  96   Resp  Min: 15  Max: 34 (!) 22   SpO2  Min: 80 %  Max: 98 % 98 %       General appearance: Well-developed, well-nourished male in no apparent distress.  at bedside.  HEENT: Atraumatic head. Moist mucous membranes of oral cavity.  Lungs:  Coarse breath sounds to auscultation bilaterally.  On BiPAP with EPAP 12, IPAP 7.  Heart: Regular rate and rhythm.  2+ pitting edema bilateral lower extremities  Abdomen: Soft, non-distended, lower abdomen. Bowel sounds are normal.  Genitourinary:  Deferred to MD.   Extremities: No cyanosis, clubbing. No deformities.  Skin: No Rash. Warm and dry.  Neuro: Awake, alert and oriented. Motor and sensory exams grossly intact.  Psych/mental status: Appropriate mood and affect. Cooperative. Responds appropriately to questions.       LABS AND IMAGING:     Recent Labs   Lab 04/19/24  0946 04/26/24  0143   WBC 5.96 7.78   RBC 2.70* 2.97*   HGB 8.1* 8.9*   HCT 27.0* 29.5*   .0* 99.3*   MCH 30.0 30.0   MCHC 30.0* 30.2*   RDW 18.3* 18.6*    220   MPV 10.3 10.1       Recent Labs   Lab 04/19/24  0946 04/26/24  0143    142   K 4.2 3.3*   CO2 19* 21*   BUN 14.7 11.4   CREATININE 0.98 1.19*   CALCIUM 8.7* 8.8   ALBUMIN 3.2* 3.3*   ALKPHOS 160* 141   ALT 46 32   AST 56* 27   BILITOT 2.1* 1.4       Microbiology Results (last 7 days)       Procedure Component Value Units Date/Time    Urine culture [[id number]] Collected: 04/26/24 0653    Order Status: Sent Specimen: Urine Updated: 04/26/24 0727             X-Ray Chest 1 View  Narrative: EXAMINATION:  XR CHEST 1 VIEW    CLINICAL HISTORY:  Shortness of breath    TECHNIQUE:  Single frontal view of  the chest was performed.    COMPARISON:  Frontal chest radiograph, 04/19/2024.    FINDINGS:  Bilateral lung opacities, slightly increased in the left mid and upper lung zone compared to prior study.  Trace pleural effusion on the left.  Right costophrenic angle is obscured.  No pneumothorax.    Enlarged cardiomediastinal silhouette, unchanged.  Aortic calcifications.    No acute osseous abnormality.  Impression: Bilateral lung opacities, increased on the left compared to prior study.    Electronically signed by: Aung Houston MD  Date:    04/26/2024  Time:    09:18  US Scrotum And Testicles  Narrative: EXAMINATION:  US SCROTUM AND TESTICLES    CLINICAL HISTORY:  Pain, unspecified    TECHNIQUE:  Sonography of the scrotum and testes.  Grayscale, color Doppler and spectral Doppler evaluation.    COMPARISON:  03/17/2024    FINDINGS:  RIGHT TESTICLE:    Size: 3 x 2.4 x 1.9 cm    Appearance: Heterogeneous echotexture.  No discrete mass.    Flow: Normal arterial and venous flow    Epididymis: Normal.    Hydrocele: No    Varicocele: Yes    LEFT TESTICLE:    Absent    OTHER: Scrotal edema.  Impression: Mildly heterogeneous appearance of the right testicle.  Blood flow demonstrated.    Absent left testicle    Right varicocele    Scrotal edema    The preliminary and final reports are concordant.    Electronically signed by: Sailaja Atkinson  Date:    04/26/2024  Time:    07:32  CT Abdomen Pelvis With IV Contrast NO Oral Contrast  Narrative: EXAMINATION:  CT ABDOMEN PELVIS WITH IV CONTRAST    CLINICAL HISTORY:  Abdominal pain, acute, nonlocalized;    TECHNIQUE:  Low dose axial images, sagittal and coronal reformations were obtained from the lung bases to the pubic symphysis following the IV administration of 100 mL of Omnipaque 350 .  Oral contrast not given.  DLP 1368.  Automated exposure control used.    COMPARISON:  Ultrasound 04/26/2024    FINDINGS:  Liver demonstrates no focal lesion.    Gallbladder and biliary ductal  system are normal.    Pancreas, stomach, spleen, adrenal glands are normal.    Kidneys demonstrate it normal enhancement.  There is asymmetric renal cortical scarring on the left.  No hydronephrosis.    There is a saccular aneurysm of the mid abdominal aorta measuring up to 3.8 cm diameter, stable.  Moderate severe atherosclerotic calcification.    Bowel loops normal with no thickening or dilation.    Mesentery normal with no inflammatory change or ascites.  No lymphadenopathy in the abdomen or pelvis.    Mild bladder wall thickening.  Slight haziness along the bladder wall margin more significant posteriorly.  Rectum normal.    Severe generalized scrotal wall thickening.  Right orchectomy.  No definite organized fluid collection.  There appears to be a hydrocele.    Bones intact.    Subsegmental area of infiltrate posterior right lung base.  Mild interstitial thickening, hazy ground-glass density at the lung bases.  Impression: Severe scrotal wall thickening suggesting cellulitis.  Appears to be a hydrocele on the left with no organized fluid collection to suggest abscess.    Mild bladder wall thickening, small area of haziness along the posterior bladder margin.  Cystitis suspected.    Saccular abdominal aortic aneurysm 3.8 cm, stable.    Area of subsegmental infiltrate right lung base.  Mild interstitial edema.    Agree with alexx.    Electronically signed by: Katrin Milligan MD  Date:    04/26/2024  Time:    06:56        ASSESSMENT & PLAN:   Assessment:  Right varicocele  Scrotal abscess status post left orchiectomy  Acute bacterial cystitis, present on admission   Saccular abdominal aortic aneurysm of 3.8 cm, stable  Bilateral lung opacities, ?  Hospital-acquired pneumonia  Acute on chronic hypoxic respiratory failure secondary to above  Macrocytic anemia, stable   Hypokalemia  Acute kidney injury  History of hypertension, hyperlipidemia, atrial fibrillation on Xarelto, valvular heart disease, coronary  artery disease status post stents, peripheral arterial disease with stent, congestive heart failure, aortic aneurysm, COPD and glaucoma    Plan:  - Will continue with vancomycin and Zosyn   - Follow results of urine and blood cultures  - chest x-ray with bilateral lung opacities, increased on the left  - After being placed on BiPAP patient was given 40 mg of IV Lasix with improvement in symptoms  - Continue with supplemental oxygen, weaning as tolerated   - Potassium has been replaced.  Continue to monitor  - IV Lasix 40 mg BID  - Resume appropriate home medications when deemed necessary   - Labs in AM      VTE Prophylaxis: will be placed on Lovenox for DVT prophylaxis and will be advised to be as mobile as possible and sit in a chair as tolerated      __________________________________________________________________________  INPATIENT LIST OF MEDICATIONS     Current Facility-Administered Medications:     acetaminophen tablet 650 mg, 650 mg, Oral, Q6H PRN, Simona West AGACNP-BC    aluminum-magnesium hydroxide-simethicone 200-200-20 mg/5 mL suspension 30 mL, 30 mL, Oral, QID PRN, Simona West AGACNP-BC    lactated ringers infusion, , Intravenous, Continuous, Simona West AGACNP-BC    levoFLOXacin 750 mg/150 mL IVPB 750 mg, 750 mg, Intravenous, Q24H, Jeanette Elizabeth MD, Stopped at 04/26/24 0652    melatonin tablet 6 mg, 6 mg, Oral, Nightly PRN, Simona West AGACNP-BC    naloxone 0.4 mg/mL injection 0.02 mg, 0.02 mg, Intravenous, PRN, Simona West AGACNP-BC    ondansetron injection 4 mg, 4 mg, Intravenous, Q4H PRN, Simona West AGACNP-BC    piperacillin-tazobactam (ZOSYN) 4.5 g in dextrose 5 % in water (D5W) 100 mL IVPB (MB+), 4.5 g, Intravenous, Q8H, Simona West, AGACNP-BC    polyethylene glycol packet 17 g, 17 g, Oral, BID PRN, Simona West, CAMILOCNP-BC    prochlorperazine injection Soln 5 mg, 5 mg, Intravenous, Q6H PRN, Simona West, AGACNP-BC    simethicone chewable  tablet 80 mg, 1 tablet, Oral, QID PRN, Simona West, [facility]    vancomycin - pharmacy to dose, , Intravenous, pharmacy to manage frequency, Harris Hernandez MD    vancomycin 750 mg in dextrose 5 % 250 mL IVPB (ready to mix), 750 mg, Intravenous, Once, Harris Hernandez MD    vancomycin in dextrose 5 % 1 gram/250 mL IVPB 1,000 mg, 1,000 mg, Intravenous, Q12H, Harris Hernandez MD    Current Outpatient Medications:     ALPRAZolam (XANAX) 0.25 MG tablet, Take 1 tablet (0.25 mg total) by mouth 3 (three) times daily as needed for Anxiety., Disp: 21 tablet, Rfl: 0    aspirin (ECOTRIN) 81 MG EC tablet, Take 1 tablet (81 mg total) by mouth once daily., Disp: 30 tablet, Rfl: 11    atorvastatin (LIPITOR) 80 MG tablet, Take 1 tablet (80 mg total) by mouth once daily., Disp: 90 tablet, Rfl: 3    empagliflozin (JARDIANCE) 10 mg tablet, Take 1 tablet (10 mg total) by mouth once daily., Disp: 90 tablet, Rfl: 3    folic acid (FOLVITE) 1 MG tablet, Take 1 tablet (1 mg total) by mouth once daily., Disp: 30 tablet, Rfl: 11    furosemide (LASIX) 40 MG tablet, Take 1 tablet (40 mg total) by mouth 2 (two) times a day., Disp: 180 tablet, Rfl: 3    hydrOXYzine HCL (ATARAX) 10 MG Tab, Take 50 mg by mouth 2 (two) times daily as needed., Disp: , Rfl:     metoprolol succinate (TOPROL-XL) 25 MG 24 hr tablet, Take 0.5 tablets (12.5 mg total) by mouth once daily., Disp: 45 tablet, Rfl: 3    pantoprazole (PROTONIX) 40 MG tablet, Take 1 tablet (40 mg total) by mouth 2 (two) times a day., Disp: 60 tablet, Rfl: 0    rivaroxaban (XARELTO) 20 mg Tab, Take 20 mg by mouth., Disp: , Rfl:     spironolactone (ALDACTONE) 25 MG tablet, Take 1 tablet (25 mg total) by mouth once daily., Disp: 90 tablet, Rfl: 3    cetirizine (ZYRTEC) 10 MG tablet, Take 1 tablet (10 mg total) by mouth once daily. for 14 days, Disp: 14 tablet, Rfl: 0    midodrine (PROAMATINE) 10 MG tablet, Take 1 tablet (10 mg total) by mouth 3 (three) times daily with  meals., Disp: 90 tablet, Rfl: 11    triamcinolone acetonide 0.1% (KENALOG) 0.1 % cream, Apply topically Daily., Disp: , Rfl:     Facility-Administered Medications Ordered in Other Encounters:     0.9%  NaCl infusion, , Intravenous, Continuous, Shannon Milligan MD, Last Rate: 10 mL/hr at 03/09/23 1020, New Bag at 03/09/23 1020    diphenhydrAMINE injection 25 mg, 25 mg, Intravenous, Once PRN, Shannon Milligan MD    LIDOcaine (PF) 10 mg/ml (1%) injection 10 mg, 1 mL, Intradermal, Once, Lanette Ulloa FNP    LIDOcaine (PF) 10 mg/ml (1%) injection 10 mg, 1 mL, Intradermal, Once, Shannon Milligan MD    ondansetron injection 4 mg, 4 mg, Intravenous, Once, Shannon Milligan MD    prochlorperazine injection Soln 5 mg, 5 mg, Intravenous, Once PRN, Shannon Milligan MD      Scheduled Meds:  Current Facility-Administered Medications   Medication Dose Route Frequency    levoFLOXacin  750 mg Intravenous Q24H    piperacillin-tazobactam (Zosyn) IV (PEDS and ADULTS) (extended infusion is not appropriate)  4.5 g Intravenous Q8H    vancomycin (VANCOCIN) IV (PEDS and ADULTS)  750 mg Intravenous Once    vancomycin (VANCOCIN) IV (PEDS and ADULTS)  1,000 mg Intravenous Q12H     Continuous Infusions:  Current Facility-Administered Medications   Medication Dose Route Frequency Last Rate Last Admin    lactated ringers   Intravenous Continuous         PRN Meds:.  Current Facility-Administered Medications:     acetaminophen, 650 mg, Oral, Q6H PRN    aluminum-magnesium hydroxide-simethicone, 30 mL, Oral, QID PRN    melatonin, 6 mg, Oral, Nightly PRN    naloxone, 0.02 mg, Intravenous, PRN    ondansetron, 4 mg, Intravenous, Q4H PRN    polyethylene glycol, 17 g, Oral, BID PRN    prochlorperazine, 5 mg, Intravenous, Q6H PRN    simethicone, 1 tablet, Oral, QID PRN    vancomycin - pharmacy to dose, , Intravenous, pharmacy to manage frequency      Discharge Planning and Disposition: Anticipated discharge to be determined.    I,  MODE Devine, have reviewed and discussed the case with Dr. Reema Mari, DO      Please see the following addendum for further assessment and plan from there attending MD.    Tessy Hernandez PA-C  04/26/2024

## 2024-04-26 NOTE — PROGRESS NOTES
"Pharmacokinetic Initial Assessment: IV Vancomycin    Assessment/Plan:    Initiate intravenous vancomycin with loading dose of 2000 mg once followed by a maintenance dose of vancomycin 1000mg IV every 12 hours  Desired empiric serum trough concentration is 15 to 20 mcg/mL  Draw vancomycin trough level 60 min prior to third dose on 04/27 at approximately 0700.  Pharmacy will continue to follow and monitor vancomycin.      Please contact pharmacy at extension [phone] with any questions regarding this assessment.     Thank you for the consult,   Michelle Gee       Patient brief summary:  Ran Reyes Jr. is a 68 y.o. male initiated on antimicrobial therapy with IV Vancomycin for treatment of suspected lower respiratory infection    Drug Allergies:   Review of patient's allergies indicates:  No Known Allergies    Actual Body Weight:   90.7kg    Renal Function:   Estimated Creatinine Clearance: 70.4 mL/min (A) (based on SCr of 1.19 mg/dL (H)).,     Dialysis Method (if applicable):  N/A    CBC (last 72 hours):  Recent Labs   Lab Result Units 04/26/24  0143   WBC x10(3)/mcL 7.78   Hgb g/dL 8.9*   Hct % 29.5*   Platelet x10(3)/mcL 220   Mono % % 13.2   Eos % % 2.1   Basophil % % 0.4       Metabolic Panel (last 72 hours):  Recent Labs   Lab Result Units 04/26/24  0143   Sodium Level mmol/L 142   Potassium Level mmol/L 3.3*   Chloride mmol/L 108*   Carbon Dioxide mmol/L 21*   Glucose Level mg/dL 105   Blood Urea Nitrogen mg/dL 11.4   Creatinine mg/dL 1.19*   Albumin Level g/dL 3.3*   Bilirubin Total mg/dL 1.4   Alkaline Phosphatase unit/L 141   Aspartate Aminotransferase unit/L 27   Alanine Aminotransferase unit/L 32       Drug levels (last 3 results):  No results for input(s): "VANCOMYCINRA", "VANCORANDOM", "VANCOMYCINPE", "VANCOPEAK", "VANCOMYCINTR", "VANCOTROUGH" in the last 72 hours.    Microbiologic Results:  Microbiology Results (last 7 days)       ** No results found for the last 168 hours. **            "

## 2024-04-27 LAB
ALBUMIN SERPL-MCNC: 3 G/DL (ref 3.4–4.8)
ALBUMIN/GLOB SERPL: 1 RATIO (ref 1.1–2)
ALP SERPL-CCNC: 116 UNIT/L (ref 40–150)
ALT SERPL-CCNC: 24 UNIT/L (ref 0–55)
AST SERPL-CCNC: 20 UNIT/L (ref 5–34)
BASOPHILS # BLD AUTO: 0.01 X10(3)/MCL
BASOPHILS NFR BLD AUTO: 0.2 %
BILIRUB SERPL-MCNC: 2.5 MG/DL
BUN SERPL-MCNC: 12.1 MG/DL (ref 8.4–25.7)
CALCIUM SERPL-MCNC: 8.4 MG/DL (ref 8.8–10)
CHLORIDE SERPL-SCNC: 102 MMOL/L (ref 98–107)
CO2 SERPL-SCNC: 27 MMOL/L (ref 23–31)
CREAT SERPL-MCNC: 0.93 MG/DL (ref 0.73–1.18)
EOSINOPHIL # BLD AUTO: 0.14 X10(3)/MCL (ref 0–0.9)
EOSINOPHIL NFR BLD AUTO: 2.1 %
ERYTHROCYTE [DISTWIDTH] IN BLOOD BY AUTOMATED COUNT: 18.1 % (ref 11.5–17)
GFR SERPLBLD CREATININE-BSD FMLA CKD-EPI: >60 MLS/MIN/1.73/M2
GLOBULIN SER-MCNC: 3.1 GM/DL (ref 2.4–3.5)
GLUCOSE SERPL-MCNC: 96 MG/DL (ref 82–115)
HCT VFR BLD AUTO: 24.8 % (ref 42–52)
HGB BLD-MCNC: 7.6 G/DL (ref 14–18)
IMM GRANULOCYTES # BLD AUTO: 0.02 X10(3)/MCL (ref 0–0.04)
IMM GRANULOCYTES NFR BLD AUTO: 0.3 %
LYMPHOCYTES # BLD AUTO: 0.71 X10(3)/MCL (ref 0.6–4.6)
LYMPHOCYTES NFR BLD AUTO: 10.8 %
MAGNESIUM SERPL-MCNC: 1.7 MG/DL (ref 1.6–2.6)
MCH RBC QN AUTO: 29.8 PG (ref 27–31)
MCHC RBC AUTO-ENTMCNC: 30.6 G/DL (ref 33–36)
MCV RBC AUTO: 97.3 FL (ref 80–94)
MONOCYTES # BLD AUTO: 0.48 X10(3)/MCL (ref 0.1–1.3)
MONOCYTES NFR BLD AUTO: 7.3 %
MRSA PCR SCRN (OHS): NOT DETECTED
NEUTROPHILS # BLD AUTO: 5.24 X10(3)/MCL (ref 2.1–9.2)
NEUTROPHILS NFR BLD AUTO: 79.3 %
NRBC BLD AUTO-RTO: 0 %
PHOSPHATE SERPL-MCNC: 3.2 MG/DL (ref 2.3–4.7)
PLATELET # BLD AUTO: 202 X10(3)/MCL (ref 130–400)
PMV BLD AUTO: 10.2 FL (ref 7.4–10.4)
POTASSIUM SERPL-SCNC: 3.8 MMOL/L (ref 3.5–5.1)
PROT SERPL-MCNC: 6.1 GM/DL (ref 5.8–7.6)
RBC # BLD AUTO: 2.55 X10(6)/MCL (ref 4.7–6.1)
SODIUM SERPL-SCNC: 138 MMOL/L (ref 136–145)
VANCOMYCIN TROUGH SERPL-MCNC: 16.6 UG/ML (ref 15–20)
WBC # SPEC AUTO: 6.6 X10(3)/MCL (ref 4.5–11.5)

## 2024-04-27 PROCEDURE — 25000003 PHARM REV CODE 250: Performed by: INTERNAL MEDICINE

## 2024-04-27 PROCEDURE — 27100171 HC OXYGEN HIGH FLOW UP TO 24 HOURS

## 2024-04-27 PROCEDURE — 84100 ASSAY OF PHOSPHORUS: CPT | Performed by: NURSE PRACTITIONER

## 2024-04-27 PROCEDURE — 63600175 PHARM REV CODE 636 W HCPCS: Performed by: INTERNAL MEDICINE

## 2024-04-27 PROCEDURE — 87641 MR-STAPH DNA AMP PROBE: CPT | Performed by: STUDENT IN AN ORGANIZED HEALTH CARE EDUCATION/TRAINING PROGRAM

## 2024-04-27 PROCEDURE — 83735 ASSAY OF MAGNESIUM: CPT | Performed by: NURSE PRACTITIONER

## 2024-04-27 PROCEDURE — 27000221 HC OXYGEN, UP TO 24 HOURS

## 2024-04-27 PROCEDURE — 63600175 PHARM REV CODE 636 W HCPCS: Performed by: NURSE PRACTITIONER

## 2024-04-27 PROCEDURE — 94660 CPAP INITIATION&MGMT: CPT

## 2024-04-27 PROCEDURE — 36415 COLL VENOUS BLD VENIPUNCTURE: CPT | Performed by: NURSE PRACTITIONER

## 2024-04-27 PROCEDURE — 99900031 HC PATIENT EDUCATION (STAT)

## 2024-04-27 PROCEDURE — 80053 COMPREHEN METABOLIC PANEL: CPT | Performed by: NURSE PRACTITIONER

## 2024-04-27 PROCEDURE — 63600175 PHARM REV CODE 636 W HCPCS: Performed by: STUDENT IN AN ORGANIZED HEALTH CARE EDUCATION/TRAINING PROGRAM

## 2024-04-27 PROCEDURE — 85025 COMPLETE CBC W/AUTO DIFF WBC: CPT | Performed by: NURSE PRACTITIONER

## 2024-04-27 PROCEDURE — 25000003 PHARM REV CODE 250: Performed by: NURSE PRACTITIONER

## 2024-04-27 PROCEDURE — 80202 ASSAY OF VANCOMYCIN: CPT | Performed by: INTERNAL MEDICINE

## 2024-04-27 PROCEDURE — 21400001 HC TELEMETRY ROOM

## 2024-04-27 PROCEDURE — 99900035 HC TECH TIME PER 15 MIN (STAT)

## 2024-04-27 PROCEDURE — 25000003 PHARM REV CODE 250: Performed by: STUDENT IN AN ORGANIZED HEALTH CARE EDUCATION/TRAINING PROGRAM

## 2024-04-27 PROCEDURE — 63600175 PHARM REV CODE 636 W HCPCS: Performed by: PHYSICIAN ASSISTANT

## 2024-04-27 PROCEDURE — 94760 N-INVAS EAR/PLS OXIMETRY 1: CPT

## 2024-04-27 RX ORDER — MAGNESIUM SULFATE HEPTAHYDRATE 40 MG/ML
2 INJECTION, SOLUTION INTRAVENOUS ONCE
Status: COMPLETED | OUTPATIENT
Start: 2024-04-27 | End: 2024-04-27

## 2024-04-27 RX ADMIN — FUROSEMIDE 20 MG: 10 INJECTION, SOLUTION INTRAMUSCULAR; INTRAVENOUS at 09:04

## 2024-04-27 RX ADMIN — ATORVASTATIN CALCIUM 80 MG: 40 TABLET, FILM COATED ORAL at 08:04

## 2024-04-27 RX ADMIN — VANCOMYCIN HYDROCHLORIDE 1000 MG: 1 INJECTION, POWDER, LYOPHILIZED, FOR SOLUTION INTRAVENOUS at 11:04

## 2024-04-27 RX ADMIN — RIVAROXABAN 20 MG: 10 TABLET, FILM COATED ORAL at 04:04

## 2024-04-27 RX ADMIN — PIPERACILLIN SODIUM AND TAZOBACTAM SODIUM 4.5 G: 4; .5 INJECTION, POWDER, LYOPHILIZED, FOR SOLUTION INTRAVENOUS at 08:04

## 2024-04-27 RX ADMIN — PIPERACILLIN SODIUM AND TAZOBACTAM SODIUM 4.5 G: 4; .5 INJECTION, POWDER, LYOPHILIZED, FOR SOLUTION INTRAVENOUS at 01:04

## 2024-04-27 RX ADMIN — MIDODRINE HYDROCHLORIDE 10 MG: 5 TABLET ORAL at 04:04

## 2024-04-27 RX ADMIN — ACETAMINOPHEN 650 MG: 325 TABLET, FILM COATED ORAL at 06:04

## 2024-04-27 RX ADMIN — MIDODRINE HYDROCHLORIDE 10 MG: 5 TABLET ORAL at 08:04

## 2024-04-27 RX ADMIN — PIPERACILLIN SODIUM AND TAZOBACTAM SODIUM 4.5 G: 4; .5 INJECTION, POWDER, LYOPHILIZED, FOR SOLUTION INTRAVENOUS at 05:04

## 2024-04-27 RX ADMIN — FOLIC ACID 1 MG: 1 TABLET ORAL at 08:04

## 2024-04-27 RX ADMIN — MAGNESIUM SULFATE HEPTAHYDRATE 2 G: 40 INJECTION, SOLUTION INTRAVENOUS at 06:04

## 2024-04-27 RX ADMIN — PANTOPRAZOLE SODIUM 40 MG: 40 TABLET, DELAYED RELEASE ORAL at 08:04

## 2024-04-27 RX ADMIN — ASPIRIN 81 MG: 81 TABLET, COATED ORAL at 08:04

## 2024-04-27 RX ADMIN — MIDODRINE HYDROCHLORIDE 10 MG: 5 TABLET ORAL at 01:04

## 2024-04-27 NOTE — PROGRESS NOTES
Pharmacokinetic Assessment Follow Up: IV Vancomycin    Vancomycin serum concentration assessment(s):    The trough level was drawn correctly and can be used to guide therapy at this time. The measurement is within the desired definitive target range of 15 to 20 mcg/mL.    Vancomycin Regimen Plan:    Continue regimen to Vancomycin 1000 mg IV every 12 hours with next serum trough concentration measured at 1030 prior to   dose on 04/29.    Drug levels (last 3 results):  Recent Labs   Lab Result Units 04/27/24  0355   Vancomycin Trough ug/ml 16.6       Pharmacy will continue to follow and monitor vancomycin.    Please contact pharmacy at extension [phone] for questions regarding this assessment.    Thank you for the consult,   Michelle Gee       Patient brief summary:  Ran Reyes Jr. is a 68 y.o. male initiated on antimicrobial therapy with IV Vancomycin for treatment of lower respiratory infection    The patient's current regimen is 1000mg q12h.    Drug Allergies:   Review of patient's allergies indicates:  No Known Allergies    Actual Body Weight:   90.7kg    Renal Function:   Estimated Creatinine Clearance: 90.1 mL/min (based on SCr of 0.93 mg/dL).,     Dialysis Method (if applicable):  N/A    CBC (last 72 hours):  Recent Labs   Lab Result Units 04/26/24  0143 04/27/24  0355   WBC x10(3)/mcL 7.78 6.60   Hgb g/dL 8.9* 7.6*   Hct % 29.5* 24.8*   Platelet x10(3)/mcL 220 202   Mono % % 13.2 7.3   Eos % % 2.1 2.1   Basophil % % 0.4 0.2       Metabolic Panel (last 72 hours):  Recent Labs   Lab Result Units 04/26/24  0143 04/26/24  0653 04/27/24  0355   Sodium Level mmol/L 142  --  138   Potassium Level mmol/L 3.3*  --  3.8   Chloride mmol/L 108*  --  102   Carbon Dioxide mmol/L 21*  --  27   Glucose Level mg/dL 105  --  96   Glucose, UA   --  4+*  --    Blood Urea Nitrogen mg/dL 11.4  --  12.1   Creatinine mg/dL 1.19*  --  0.93   Albumin Level g/dL 3.3*  --  3.0*   Bilirubin Total mg/dL 1.4  --  2.5*   Alkaline  Phosphatase unit/L 141  --  116   Aspartate Aminotransferase unit/L 27  --  20   Alanine Aminotransferase unit/L 32  --  24   Magnesium Level mg/dL  --   --  1.70   Phosphorus Level mg/dL  --   --  3.2       Vancomycin Administrations:  vancomycin given in the last 96 hours                     vancomycin in dextrose 5 % 1 gram/250 mL IVPB 1,000 mg (mg) 1,000 mg New Bag 04/27/24 1132     1,000 mg New Bag 04/26/24 2028    vancomycin 750 mg in dextrose 5 % 250 mL IVPB (ready to mix) (mg) 750 mg New Bag 04/26/24 1004    vancomycin 1.25 g in dextrose 5% 250 mL IVPB (ready to mix) (mg) 1,250 mg New Bag 04/26/24 0521                    Microbiologic Results:  Microbiology Results (last 7 days)       Procedure Component Value Units Date/Time    Urine culture [[id number]]  (Abnormal) Collected: 04/26/24 0653    Order Status: Completed Specimen: Urine Updated: 04/27/24 0838     Urine Culture No other significant growth      10,000 - 25,000 colonies/ml Gram-negative Rods    Respiratory Culture [[id number]] Collected: 04/26/24 2132    Order Status: Completed Specimen: Sputum, Expectorated Updated: 04/27/24 0756     Respiratory Culture Rare normal respiratory jt     GRAM STAIN Quality 1+      Rare Yeast    Blood Culture [[id number]] Collected: 04/26/24 1011    Order Status: Resulted Specimen: Blood from Arm, Left Updated: 04/26/24 1126    Blood Culture [[id number]] Collected: 04/26/24 1011    Order Status: Resulted Specimen: Blood from Arm, Right Updated: 04/26/24 1110

## 2024-04-27 NOTE — PROVIDER PROGRESS NOTES - EMERGENCY DEPT.
Encounter Date: 4/25/2024    ED Physician Progress Notes          Called to patient's bedside Rapid Response for acutely worsening dyspnea. Primary team contacted but has not yet responded. Patient with history of CHF, VHD, COPD on home oxygen, here for groin swelling and bleeding, s/p left orchiectomy in March. Received antibiotics for presumed HCAP. He is admitted and did report SOB on arrival, was requiring more oxygen than his home dose. Dropped to 82%, placed on NRB 15 LPM; patient still tachypneic in the 30s, sats in the 80s.  On arrival, he is tachypneic, in tripod position, labored respirations with accessory muscle use. BS with coarse rales diffusely, R>L, with scattered expiratory wheezing. BLE 3+ pitting edema.  BiPAP initiated.   CXR with bilateral opacities, concern also for pulmonary edema. Lasix 40mg IV ordered. Patient with improvement in respiratory status on BiPAP. Patient is a full code. Per review of the chart, patient seen here last week for dyspnea after Lasix dose decreased, improved after diuresis with additional Lasix.     During my exam, his primary team arrived in the room. Care turned back over to primary team.    Critical care time spent on the evaluation and treatment of severe organ dysfunction, review of pertinent labs and imaging studies, discussions with consulting providers and discussions with patient/family: 15 minutes.      Rachel Walsh MD 10:59 AM

## 2024-04-27 NOTE — PROGRESS NOTES
[facility] Hospital Medicine Progress Note        Chief Complaint: Inpatient Follow-up for     HPI:   Ran Reyes Jr. is a 68 y.o. Black or  male with a past medical history of hypertension, hyperlipidemia, atrial fibrillation on Xarelto, valvular heart disease, coronary artery disease status post stents, peripheral arterial disease with stent, congestive heart failure, aortic aneurysm, COPD L O2 at home and glaucoma.  Patient was recent admission to hospital medicine services from 03/17/2024 to 04/12/2024 for sepsis secondary to a scrotal abscess with component of early Claudine's with exploration, I and D and left orchectomy on 03/18/2024.  Hospital stay was complicated as patient had a cardiac arrest with ventricular tachycardia requiring defibrillation x3 on 03/18/2024 and developed a GI bleed. The patient presented to [facility] on 4/26/2024 with a primary complaint of bleeding from the right scrotum and abdominal pain which started today.  Patient reports having pain across the bilateral lower extremities in bilateral groin wrapping to the back.  Associated symptoms include shortness a breath, cough with intermittent blood-tinged sputum, chills.  He denies complaints of chest pain, nausea, vomiting and diarrhea.     Upon presentation to the ED, temperature 98.8° F, heart rate 66, blood pressure 124/66, respiratory rate 15 and SpO2 95% on room air. Labs with H&H 8.9/29.5, MCV 99.3, potassium 3.3, BUN/creatinine 11.4/1.19 (14.7/0.98 on 04/19/2024).  UA with trace squamous epithelial cells, few WBC clumps, occasional bacteria, greater than 100 WBCs, 21-50 RBCs, 500 leukocyte esterase, 2+ urobilinogen, 2+ blood, 4+ glucose and trace protein.  Ultrasound of the scrotum/testis revealed mild heterogeneous appearance of the right testicle with blood flow demonstrated, absent left testicle, right varicocele, scrotal edema.  CT abdomen and pelvis with severe scrotal wall  thickening suggesting cellulitis, appears to be a hydrocele on the left with no organized fluid collection to suggest abscess, mild bladder wall thickening some areas of haziness along the posterior bladder, cystitis is suspected, saccular abdominal aortic aneurysm of 3.8 cm which is stable an area of subsegmental infiltrate to the right lung base with mild interstitial edema.  In ED patient received Zosyn, vancomycin, 25 mEq of potassium chloride carbonate, Zofran and morphine.  Patient oxygen saturation decreased and he was placed on nasal cannula with titration up to OxyMask and non-rebreather at 15 L. RRT was called and patient was placed on BiPAP. Patient is admitted to hospital medicine services for further medical management.    Interval Hx:   Patient seen and examined by bedside.  Patient doing much better this morning.  Currently on OxyMask and at 4 L.  Says he feels a lot better to in improvement in shortness a breath.  Does not look like in any respiratory distress.  Says his scrotal pain and bleeding has also decreased      Case was discussed with patient's nurse and  on the floor.    Objective/physical exam:  General: In no acute distress, afebrile  Chest: Clear to auscultation bilaterally  Heart: RRR, +S1, S2, no appreciable murmur  Abdomen: Soft, nontender, BS +  MSK: Warm, no lower extremity edema, no clubbing or cyanosis  Neurologic: Alert and oriented x4, Cranial nerve II-XII intact, Strength 5/5 in all 4 extremities    VITAL SIGNS: 24 HRS MIN & MAX LAST   Temp  Min: 97.9 °F (36.6 °C)  Max: 100.9 °F (38.3 °C) 99.7 °F (37.6 °C)   BP  Min: 92/62  Max: 106/70 92/62   Pulse  Min: 63  Max: 91  91   Resp  Min: 19  Max: 24 (!) 24   SpO2  Min: 94 %  Max: 100 % 97 %     I have reviewed the following labs:  Recent Labs   Lab 04/26/24  0143 04/27/24  0355   WBC 7.78 6.60   RBC 2.97* 2.55*   HGB 8.9* 7.6*   HCT 29.5* 24.8*   MCV 99.3* 97.3*   MCH 30.0 29.8   MCHC 30.2* 30.6*   RDW 18.6* 18.1*   PLT  220 202   MPV 10.1 10.2     Recent Labs   Lab 04/26/24  0143 04/27/24  0355    138   K 3.3* 3.8   CO2 21* 27   BUN 11.4 12.1   CREATININE 1.19* 0.93   CALCIUM 8.8 8.4*   MG  --  1.70   ALBUMIN 3.3* 3.0*   ALKPHOS 141 116   ALT 32 24   AST 27 20   BILITOT 1.4 2.5*     Microbiology Results (last 7 days)       Procedure Component Value Units Date/Time    Blood Culture [[id number]]  (Normal) Collected: 04/26/24 1011    Order Status: Completed Specimen: Blood from Arm, Left Updated: 04/27/24 1300     CULTURE, BLOOD ([facility]) No Growth At 24 Hours    Blood Culture [[id number]]  (Normal) Collected: 04/26/24 1011    Order Status: Completed Specimen: Blood from Arm, Right Updated: 04/27/24 1300     CULTURE, BLOOD ([facility]) No Growth At 24 Hours    Urine culture [[id number]]  (Abnormal) Collected: 04/26/24 0653    Order Status: Completed Specimen: Urine Updated: 04/27/24 0838     Urine Culture No other significant growth      10,000 - 25,000 colonies/ml Gram-negative Rods    Respiratory Culture [[id number]] Collected: 04/26/24 2132    Order Status: Completed Specimen: Sputum, Expectorated Updated: 04/27/24 0756     Respiratory Culture Rare normal respiratory jt     GRAM STAIN Quality 1+      Rare Yeast             See below for Radiology    Scheduled Med:  Current Facility-Administered Medications   Medication Dose Route Frequency    aspirin  81 mg Oral Daily    atorvastatin  80 mg Oral Daily    folic acid  1 mg Oral Daily    furosemide (LASIX) injection  40 mg Intravenous Q12H    midodrine  10 mg Oral TID WM    pantoprazole  40 mg Oral BID    piperacillin-tazobactam (Zosyn) IV (PEDS and ADULTS) (extended infusion is not appropriate)  4.5 g Intravenous Q8H    rivaroxaban  20 mg Oral with dinner    vancomycin (VANCOCIN) IV (PEDS and ADULTS)  1,000 mg Intravenous Q12H      Continuous Infusions:  Current Facility-Administered Medications   Medication Dose Route Frequency Last Rate Last Admin      PRN Meds:    Current  Facility-Administered Medications:     acetaminophen, 650 mg, Oral, Q6H PRN    ALPRAZolam, 0.25 mg, Oral, TID PRN    aluminum-magnesium hydroxide-simethicone, 30 mL, Oral, QID PRN    hydrOXYzine HCL, 50 mg, Oral, BID PRN    melatonin, 6 mg, Oral, Nightly PRN    naloxone, 0.02 mg, Intravenous, PRN    ondansetron, 4 mg, Intravenous, Q4H PRN    polyethylene glycol, 17 g, Oral, BID PRN    prochlorperazine, 5 mg, Intravenous, Q6H PRN    simethicone, 1 tablet, Oral, QID PRN    vancomycin - pharmacy to dose, , Intravenous, pharmacy to manage frequency     Assessment/Plan:  Scrotal cellulitis  Acute on chronic hypoxic respiratory failure patient chronically on 2 L at home, improved  Acute on chronic CHF exacerbation, improving  Recent scrotal abscess status post left orchiectomy  Bilateral lung opacities, concern for hospital-acquired pneumonias patient was recently admitted   Mild hypokalemia  Acute on chronic blood loss anemia, unclear etiology at this time  Acute bacterial cystitis  Saccular abdominal aortic aneurism, 3.8 cm, stable   History of hypertension, hyperlipidemia, AFib on Xarelto, valvular heart disease, COPD     Continue vanc and Zosyn for cellulitis and hospital-acquired pneumonia   We will deescalate as appropriate   MRSA PCR normal, DC vanc  Continue Zosyn, day 2  Blood cultures normal, urine culture shows minimal growth of organisms  Hemoglobin decreased from 8.9-7.6 this a.m.   Patient denies any worsening scrotal bleeding or melena or hematochezia  Patient had recent EGD done last month that LA grade B reflux esophagitis with no bleeding, chronic gastritis, and chronic duodenitis  PPI b.i.d. continued   Continue IV Lasix today  Can likely transitioned to oral tomorrow  Recent angiogram showed nonobstructive CAD  Continue Xarelto at this time  Reassess in a.m. for further need of diuresis  Accurate Is&Os, strict daily weights  Echo obtained on 03/27 showed EF of 50-55% with moderate mitral  regurgitation  Further recs based on clinical course   Labs in a.m.    Critical care note:  Critical care diagnosis:  CHF requiring IV Lasix  Critical care interventions: Hands-on evaluation, review of labs/radiographs/records and discussion with patient and family if present  Critical care time spent: 35 minutes     VTE prophylaxis:  On Xarelto    Patient condition:  Stable/Fair/Guarded/ Serious/ Critical    Anticipated discharge and Disposition:         All diagnosis and differential diagnosis have been reviewed; assessment and plan has been documented; I have personally reviewed the labs and test results that are presently available; I have reviewed the patients medication list; I have reviewed the consulting providers response and recommendations. I have reviewed or attempted to review medical records based upon their availability    All of the patient's questions have been  addressed and answered. Patient's is agreeable to the above stated plan. I will continue to monitor closely and make adjustments to medical management as needed.  _____________________________________________________________________    Nutrition Status:    Radiology:  I have personally reviewed the following imaging and agree with the radiologist.     X-Ray Chest 1 View  Narrative: EXAMINATION:  XR CHEST 1 VIEW    CLINICAL HISTORY:  Shortness of breath    TECHNIQUE:  Single frontal view of the chest was performed.    COMPARISON:  Frontal chest radiograph, 04/19/2024.    FINDINGS:  Bilateral lung opacities, slightly increased in the left mid and upper lung zone compared to prior study.  Trace pleural effusion on the left.  Right costophrenic angle is obscured.  No pneumothorax.    Enlarged cardiomediastinal silhouette, unchanged.  Aortic calcifications.    No acute osseous abnormality.  Impression: Bilateral lung opacities, increased on the left compared to prior study.    Electronically signed by: Aung Houston  MD  Date:    04/26/2024  Time:    09:18  US Scrotum And Testicles  Narrative: EXAMINATION:  US SCROTUM AND TESTICLES    CLINICAL HISTORY:  Pain, unspecified    TECHNIQUE:  Sonography of the scrotum and testes.  Grayscale, color Doppler and spectral Doppler evaluation.    COMPARISON:  03/17/2024    FINDINGS:  RIGHT TESTICLE:    Size: 3 x 2.4 x 1.9 cm    Appearance: Heterogeneous echotexture.  No discrete mass.    Flow: Normal arterial and venous flow    Epididymis: Normal.    Hydrocele: No    Varicocele: Yes    LEFT TESTICLE:    Absent    OTHER: Scrotal edema.  Impression: Mildly heterogeneous appearance of the right testicle.  Blood flow demonstrated.    Absent left testicle    Right varicocele    Scrotal edema    The preliminary and final reports are concordant.    Electronically signed by: Sailaja Atkinson  Date:    04/26/2024  Time:    07:32  CT Abdomen Pelvis With IV Contrast NO Oral Contrast  Narrative: EXAMINATION:  CT ABDOMEN PELVIS WITH IV CONTRAST    CLINICAL HISTORY:  Abdominal pain, acute, nonlocalized;    TECHNIQUE:  Low dose axial images, sagittal and coronal reformations were obtained from the lung bases to the pubic symphysis following the IV administration of 100 mL of Omnipaque 350 .  Oral contrast not given.  DLP 1368.  Automated exposure control used.    COMPARISON:  Ultrasound 04/26/2024    FINDINGS:  Liver demonstrates no focal lesion.    Gallbladder and biliary ductal system are normal.    Pancreas, stomach, spleen, adrenal glands are normal.    Kidneys demonstrate it normal enhancement.  There is asymmetric renal cortical scarring on the left.  No hydronephrosis.    There is a saccular aneurysm of the mid abdominal aorta measuring up to 3.8 cm diameter, stable.  Moderate severe atherosclerotic calcification.    Bowel loops normal with no thickening or dilation.    Mesentery normal with no inflammatory change or ascites.  No lymphadenopathy in the abdomen or pelvis.    Mild bladder wall  thickening.  Slight haziness along the bladder wall margin more significant posteriorly.  Rectum normal.    Severe generalized scrotal wall thickening.  Right orchectomy.  No definite organized fluid collection.  There appears to be a hydrocele.    Bones intact.    Subsegmental area of infiltrate posterior right lung base.  Mild interstitial thickening, hazy ground-glass density at the lung bases.  Impression: Severe scrotal wall thickening suggesting cellulitis.  Appears to be a hydrocele on the left with no organized fluid collection to suggest abscess.    Mild bladder wall thickening, small area of haziness along the posterior bladder margin.  Cystitis suspected.    Saccular abdominal aortic aneurysm 3.8 cm, stable.    Area of subsegmental infiltrate right lung base.  Mild interstitial edema.    Agree with [facility]k.    Electronically signed by: Katrin Milligan MD  Date:    04/26/2024  Time:    06:56      Reema Mari DO  Department of Hospital Medicine  [facility]  04/27/2024

## 2024-04-28 LAB
ALBUMIN SERPL-MCNC: 3 G/DL (ref 3.4–4.8)
ALBUMIN/GLOB SERPL: 0.9 RATIO (ref 1.1–2)
ALP SERPL-CCNC: 112 UNIT/L (ref 40–150)
ALT SERPL-CCNC: 20 UNIT/L (ref 0–55)
AST SERPL-CCNC: 18 UNIT/L (ref 5–34)
BACTERIA UR CULT: ABNORMAL
BASOPHILS # BLD AUTO: 0.01 X10(3)/MCL
BASOPHILS NFR BLD AUTO: 0.2 %
BILIRUB SERPL-MCNC: 1.6 MG/DL
BUN SERPL-MCNC: 12.7 MG/DL (ref 8.4–25.7)
CALCIUM SERPL-MCNC: 8.4 MG/DL (ref 8.8–10)
CHLORIDE SERPL-SCNC: 101 MMOL/L (ref 98–107)
CO2 SERPL-SCNC: 26 MMOL/L (ref 23–31)
CREAT SERPL-MCNC: 0.85 MG/DL (ref 0.73–1.18)
EOSINOPHIL # BLD AUTO: 0.15 X10(3)/MCL (ref 0–0.9)
EOSINOPHIL NFR BLD AUTO: 2.3 %
ERYTHROCYTE [DISTWIDTH] IN BLOOD BY AUTOMATED COUNT: 17.6 % (ref 11.5–17)
GFR SERPLBLD CREATININE-BSD FMLA CKD-EPI: >60 MLS/MIN/1.73/M2
GLOBULIN SER-MCNC: 3.2 GM/DL (ref 2.4–3.5)
GLUCOSE SERPL-MCNC: 121 MG/DL (ref 82–115)
HCT VFR BLD AUTO: 25.6 % (ref 42–52)
HGB BLD-MCNC: 7.7 G/DL (ref 14–18)
IMM GRANULOCYTES # BLD AUTO: 0.03 X10(3)/MCL (ref 0–0.04)
IMM GRANULOCYTES NFR BLD AUTO: 0.5 %
LYMPHOCYTES # BLD AUTO: 0.76 X10(3)/MCL (ref 0.6–4.6)
LYMPHOCYTES NFR BLD AUTO: 11.6 %
MAGNESIUM SERPL-MCNC: 2 MG/DL (ref 1.6–2.6)
MCH RBC QN AUTO: 29.3 PG (ref 27–31)
MCHC RBC AUTO-ENTMCNC: 30.1 G/DL (ref 33–36)
MCV RBC AUTO: 97.3 FL (ref 80–94)
MONOCYTES # BLD AUTO: 0.58 X10(3)/MCL (ref 0.1–1.3)
MONOCYTES NFR BLD AUTO: 8.8 %
NEUTROPHILS # BLD AUTO: 5.05 X10(3)/MCL (ref 2.1–9.2)
NEUTROPHILS NFR BLD AUTO: 76.6 %
NRBC BLD AUTO-RTO: 0 %
PHOSPHATE SERPL-MCNC: 2.7 MG/DL (ref 2.3–4.7)
PLATELET # BLD AUTO: 203 X10(3)/MCL (ref 130–400)
PMV BLD AUTO: 9.8 FL (ref 7.4–10.4)
POTASSIUM SERPL-SCNC: 3.7 MMOL/L (ref 3.5–5.1)
PROT SERPL-MCNC: 6.2 GM/DL (ref 5.8–7.6)
RBC # BLD AUTO: 2.63 X10(6)/MCL (ref 4.7–6.1)
SODIUM SERPL-SCNC: 136 MMOL/L (ref 136–145)
WBC # SPEC AUTO: 6.58 X10(3)/MCL (ref 4.5–11.5)

## 2024-04-28 PROCEDURE — 63600175 PHARM REV CODE 636 W HCPCS: Performed by: NURSE PRACTITIONER

## 2024-04-28 PROCEDURE — 80053 COMPREHEN METABOLIC PANEL: CPT | Performed by: STUDENT IN AN ORGANIZED HEALTH CARE EDUCATION/TRAINING PROGRAM

## 2024-04-28 PROCEDURE — 85025 COMPLETE CBC W/AUTO DIFF WBC: CPT | Performed by: STUDENT IN AN ORGANIZED HEALTH CARE EDUCATION/TRAINING PROGRAM

## 2024-04-28 PROCEDURE — S4991 NICOTINE PATCH NONLEGEND: HCPCS | Performed by: STUDENT IN AN ORGANIZED HEALTH CARE EDUCATION/TRAINING PROGRAM

## 2024-04-28 PROCEDURE — 25000003 PHARM REV CODE 250: Performed by: NURSE PRACTITIONER

## 2024-04-28 PROCEDURE — 99900035 HC TECH TIME PER 15 MIN (STAT)

## 2024-04-28 PROCEDURE — 21400001 HC TELEMETRY ROOM

## 2024-04-28 PROCEDURE — 94760 N-INVAS EAR/PLS OXIMETRY 1: CPT

## 2024-04-28 PROCEDURE — 83735 ASSAY OF MAGNESIUM: CPT | Performed by: STUDENT IN AN ORGANIZED HEALTH CARE EDUCATION/TRAINING PROGRAM

## 2024-04-28 PROCEDURE — 84100 ASSAY OF PHOSPHORUS: CPT | Performed by: STUDENT IN AN ORGANIZED HEALTH CARE EDUCATION/TRAINING PROGRAM

## 2024-04-28 PROCEDURE — 63600175 PHARM REV CODE 636 W HCPCS: Performed by: STUDENT IN AN ORGANIZED HEALTH CARE EDUCATION/TRAINING PROGRAM

## 2024-04-28 PROCEDURE — 99900031 HC PATIENT EDUCATION (STAT)

## 2024-04-28 PROCEDURE — 36415 COLL VENOUS BLD VENIPUNCTURE: CPT | Performed by: STUDENT IN AN ORGANIZED HEALTH CARE EDUCATION/TRAINING PROGRAM

## 2024-04-28 PROCEDURE — 94660 CPAP INITIATION&MGMT: CPT

## 2024-04-28 PROCEDURE — 25000003 PHARM REV CODE 250: Performed by: STUDENT IN AN ORGANIZED HEALTH CARE EDUCATION/TRAINING PROGRAM

## 2024-04-28 PROCEDURE — 27000221 HC OXYGEN, UP TO 24 HOURS

## 2024-04-28 RX ORDER — TRAZODONE HYDROCHLORIDE 50 MG/1
50 TABLET ORAL NIGHTLY
Status: DISCONTINUED | OUTPATIENT
Start: 2024-04-28 | End: 2024-04-30 | Stop reason: HOSPADM

## 2024-04-28 RX ORDER — IBUPROFEN 200 MG
1 TABLET ORAL DAILY
Status: DISCONTINUED | OUTPATIENT
Start: 2024-04-28 | End: 2024-04-30 | Stop reason: HOSPADM

## 2024-04-28 RX ORDER — FUROSEMIDE 40 MG/1
40 TABLET ORAL 2 TIMES DAILY
Status: DISCONTINUED | OUTPATIENT
Start: 2024-04-28 | End: 2024-04-30 | Stop reason: HOSPADM

## 2024-04-28 RX ORDER — DOXYCYCLINE HYCLATE 100 MG
100 TABLET ORAL EVERY 12 HOURS
Status: DISCONTINUED | OUTPATIENT
Start: 2024-04-28 | End: 2024-04-30 | Stop reason: HOSPADM

## 2024-04-28 RX ADMIN — FOLIC ACID 1 MG: 1 TABLET ORAL at 08:04

## 2024-04-28 RX ADMIN — PANTOPRAZOLE SODIUM 40 MG: 40 TABLET, DELAYED RELEASE ORAL at 08:04

## 2024-04-28 RX ADMIN — FUROSEMIDE 40 MG: 40 TABLET ORAL at 08:04

## 2024-04-28 RX ADMIN — ASPIRIN 81 MG: 81 TABLET, COATED ORAL at 08:04

## 2024-04-28 RX ADMIN — MIDODRINE HYDROCHLORIDE 10 MG: 5 TABLET ORAL at 08:04

## 2024-04-28 RX ADMIN — ATORVASTATIN CALCIUM 80 MG: 40 TABLET, FILM COATED ORAL at 08:04

## 2024-04-28 RX ADMIN — TRAZODONE HYDROCHLORIDE 50 MG: 50 TABLET ORAL at 08:04

## 2024-04-28 RX ADMIN — FUROSEMIDE 40 MG: 40 TABLET ORAL at 05:04

## 2024-04-28 RX ADMIN — CEFTRIAXONE SODIUM 1 G: 1 INJECTION, POWDER, FOR SOLUTION INTRAMUSCULAR; INTRAVENOUS at 08:04

## 2024-04-28 RX ADMIN — MIDODRINE HYDROCHLORIDE 10 MG: 5 TABLET ORAL at 12:04

## 2024-04-28 RX ADMIN — MIDODRINE HYDROCHLORIDE 10 MG: 5 TABLET ORAL at 05:04

## 2024-04-28 RX ADMIN — DOXYCYCLINE HYCLATE 100 MG: 100 TABLET, COATED ORAL at 08:04

## 2024-04-28 RX ADMIN — PIPERACILLIN SODIUM AND TAZOBACTAM SODIUM 4.5 G: 4; .5 INJECTION, POWDER, LYOPHILIZED, FOR SOLUTION INTRAVENOUS at 06:04

## 2024-04-28 RX ADMIN — NICOTINE 1 PATCH: 21 PATCH, EXTENDED RELEASE TRANSDERMAL at 12:04

## 2024-04-28 RX ADMIN — RIVAROXABAN 20 MG: 10 TABLET, FILM COATED ORAL at 05:04

## 2024-04-28 NOTE — PROGRESS NOTES
[facility] Hospital Medicine Progress Note        Chief Complaint: Inpatient Follow-up for     HPI:   Ran Reyes Jr. is a 68 y.o. Black or  male with a past medical history of hypertension, hyperlipidemia, atrial fibrillation on Xarelto, valvular heart disease, coronary artery disease status post stents, peripheral arterial disease with stent, congestive heart failure, aortic aneurysm, COPD L O2 at home and glaucoma.  Patient was recent admission to hospital medicine services from 03/17/2024 to 04/12/2024 for sepsis secondary to a scrotal abscess with component of early Claudine's with exploration, I and D and left orchectomy on 03/18/2024.  Hospital stay was complicated as patient had a cardiac arrest with ventricular tachycardia requiring defibrillation x3 on 03/18/2024 and developed a GI bleed. The patient presented to [facility] on 4/26/2024 with a primary complaint of bleeding from the right scrotum and abdominal pain which started today.  Patient reports having pain across the bilateral lower extremities in bilateral groin wrapping to the back.  Associated symptoms include shortness a breath, cough with intermittent blood-tinged sputum, chills.  He denies complaints of chest pain, nausea, vomiting and diarrhea.     Upon presentation to the ED, temperature 98.8° F, heart rate 66, blood pressure 124/66, respiratory rate 15 and SpO2 95% on room air. Labs with H&H 8.9/29.5, MCV 99.3, potassium 3.3, BUN/creatinine 11.4/1.19 (14.7/0.98 on 04/19/2024).  UA with trace squamous epithelial cells, few WBC clumps, occasional bacteria, greater than 100 WBCs, 21-50 RBCs, 500 leukocyte esterase, 2+ urobilinogen, 2+ blood, 4+ glucose and trace protein.  Ultrasound of the scrotum/testis revealed mild heterogeneous appearance of the right testicle with blood flow demonstrated, absent left testicle, right varicocele, scrotal edema.  CT abdomen and pelvis with severe scrotal wall  thickening suggesting cellulitis, appears to be a hydrocele on the left with no organized fluid collection to suggest abscess, mild bladder wall thickening some areas of haziness along the posterior bladder, cystitis is suspected, saccular abdominal aortic aneurysm of 3.8 cm which is stable an area of subsegmental infiltrate to the right lung base with mild interstitial edema.  In ED patient received Zosyn, vancomycin, 25 mEq of potassium chloride carbonate, Zofran and morphine.  Patient oxygen saturation decreased and he was placed on nasal cannula with titration up to OxyMask and non-rebreather at 15 L. RRT was called and patient was placed on BiPAP. Patient is admitted to hospital medicine services for further medical management.    Interval Hx:   Patient seen and examined by bedside.  On 2 L of OxyMask green now.  Does not appear to be in any respiratory distress.  Says feels a lot better.  Ready to go home.  Denies any further scrotal edema or scrotal pain.  Had a bowel movement this a.m. and was brown.  No acute overnight events    Case was discussed with patient's nurse and  on the floor.    Objective/physical exam:  General: In no acute distress, afebrile  Chest: Clear to auscultation bilaterally  Heart: RRR, +S1, S2, no appreciable murmur  Abdomen: Soft, nontender, BS +  MSK: Warm, no lower extremity edema, no clubbing or cyanosis  Neurologic: Alert and oriented x4, Cranial nerve II-XII intact, Strength 5/5 in all 4 extremities    VITAL SIGNS: 24 HRS MIN & MAX LAST   Temp  Min: 97.6 °F (36.4 °C)  Max: 99.7 °F (37.6 °C) 98.3 °F (36.8 °C)   BP  Min: 90/59  Max: 112/67 97/65   Pulse  Min: 64  Max: 91  82   Resp  Min: 20  Max: 24 20   SpO2  Min: 80 %  Max: 100 % (!) 80 %     I have reviewed the following labs:  Recent Labs   Lab 04/26/24  0143 04/27/24  0355 04/28/24  0828   WBC 7.78 6.60 6.58   RBC 2.97* 2.55* 2.63*   HGB 8.9* 7.6* 7.7*   HCT 29.5* 24.8* 25.6*   MCV 99.3* 97.3* 97.3*   MCH 30.0  29.8 29.3   MCHC 30.2* 30.6* 30.1*   RDW 18.6* 18.1* 17.6*    202 203   MPV 10.1 10.2 9.8     Recent Labs   Lab 04/26/24  0143 04/27/24  0355 04/28/24  0828    138 136   K 3.3* 3.8 3.7   CO2 21* 27 26   BUN 11.4 12.1 12.7   CREATININE 1.19* 0.93 0.85   CALCIUM 8.8 8.4* 8.4*   MG  --  1.70 2.00   ALBUMIN 3.3* 3.0* 3.0*   ALKPHOS 141 116 112   ALT 32 24 20   AST 27 20 18   BILITOT 1.4 2.5* 1.6*     Microbiology Results (last 7 days)       Procedure Component Value Units Date/Time    Urine culture [[id number]]  (Abnormal) Collected: 04/26/24 0653    Order Status: Completed Specimen: Urine Updated: 04/28/24 1002     Urine Culture Multiple organisms present indicate probable contamination. Recommend recollection.      10,000 - 25,000 colonies/ml Gram-negative Rods      10,000 - 25,000 colonies/ml Gram-positive Cocci      10,000 - 25,000 colonies/ml Gram-positive Rods    Respiratory Culture [[id number]]  (Abnormal) Collected: 04/26/24 2132    Order Status: Completed Specimen: Sputum, Expectorated Updated: 04/28/24 0745     Respiratory Culture Moderate Gram-negative Rods      Moderate Yeast     Comment: with normal respiratory jt        GRAM STAIN Quality 1+      Rare Yeast    Blood Culture [[id number]]  (Normal) Collected: 04/26/24 1011    Order Status: Completed Specimen: Blood from Arm, Left Updated: 04/27/24 1300     CULTURE, BLOOD ([facility]) No Growth At 24 Hours    Blood Culture [[id number]]  (Normal) Collected: 04/26/24 1011    Order Status: Completed Specimen: Blood from Arm, Right Updated: 04/27/24 1300     CULTURE, BLOOD ([facility]) No Growth At 24 Hours             See below for Radiology    Scheduled Med:  Current Facility-Administered Medications   Medication Dose Route Frequency    aspirin  81 mg Oral Daily    atorvastatin  80 mg Oral Daily    cefTRIAXone (Rocephin) IV (PEDS and ADULTS)  1 g Intravenous Q24H    doxycycline  100 mg Oral Q12H    folic acid  1 mg Oral Daily    furosemide  40 mg Oral BID     midodrine  10 mg Oral TID WM    pantoprazole  40 mg Oral BID    rivaroxaban  20 mg Oral with dinner      Continuous Infusions:  Current Facility-Administered Medications   Medication Dose Route Frequency Last Rate Last Admin      PRN Meds:    Current Facility-Administered Medications:     acetaminophen, 650 mg, Oral, Q6H PRN    ALPRAZolam, 0.25 mg, Oral, TID PRN    aluminum-magnesium hydroxide-simethicone, 30 mL, Oral, QID PRN    hydrOXYzine HCL, 50 mg, Oral, BID PRN    melatonin, 6 mg, Oral, Nightly PRN    naloxone, 0.02 mg, Intravenous, PRN    ondansetron, 4 mg, Intravenous, Q4H PRN    polyethylene glycol, 17 g, Oral, BID PRN    prochlorperazine, 5 mg, Intravenous, Q6H PRN    simethicone, 1 tablet, Oral, QID PRN     Assessment/Plan:  Scrotal cellulitis  Acute on chronic hypoxic respiratory failure patient chronically on 2 L at home, improved  Acute on chronic CHF exacerbation, improving  Recent scrotal abscess status post left orchiectomy  Bilateral lung opacities, concern for hospital-acquired pneumonias patient was recently admitted   Mild hypokalemia  Acute on chronic blood loss anemia, unclear etiology at this time  Acute bacterial cystitis  Saccular abdominal aortic aneurism, 3.8 cm, stable   History of hypertension, hyperlipidemia, AFib on Xarelto, valvular heart disease, COPD     Vancomycin stopped on 04/27 after MRSA PCR negative   Zosyn discontinued after 3 days  We will deescalate to Rocephin at this time and add doxycycline for further atypical coverage and for cellulitis  Blood cultures normal, urine culture shows minimal growth of organisms  Hemoglobin remaining stable at this time  Patient denies any worsening scrotal bleeding or melena or hematochezia  Patient had recent EGD done last month that LA grade B reflux esophagitis with no bleeding, chronic gastritis, and chronic duodenitis  PPI b.i.d. continued   Discontinue IV Lasix and restart oral home Lasix of 40 mg b.i.d.  Recent angiogram showed  nonobstructive CAD  Continue Xarelto at this time  Reassess in a.m. for further need of diuresis  Accurate Is&Os, strict daily weights  Echo obtained on 03/27 showed EF of 50-55% with moderate mitral regurgitation  Wean oxygenation to maintain greater than 90%  Further recs based on clinical course   Labs in a.m.    VTE prophylaxis:  On Xarelto    Patient condition:  Stable/Fair/Guarded/ Serious/ Critical    Anticipated discharge and Disposition:         All diagnosis and differential diagnosis have been reviewed; assessment and plan has been documented; I have personally reviewed the labs and test results that are presently available; I have reviewed the patients medication list; I have reviewed the consulting providers response and recommendations. I have reviewed or attempted to review medical records based upon their availability    All of the patient's questions have been  addressed and answered. Patient's is agreeable to the above stated plan. I will continue to monitor closely and make adjustments to medical management as needed.  _____________________________________________________________________    Nutrition Status:    Radiology:  I have personally reviewed the following imaging and agree with the radiologist.     X-Ray Chest 1 View  Narrative: EXAMINATION:  XR CHEST 1 VIEW    CLINICAL HISTORY:  Shortness of breath    TECHNIQUE:  Single frontal view of the chest was performed.    COMPARISON:  Frontal chest radiograph, 04/19/2024.    FINDINGS:  Bilateral lung opacities, slightly increased in the left mid and upper lung zone compared to prior study.  Trace pleural effusion on the left.  Right costophrenic angle is obscured.  No pneumothorax.    Enlarged cardiomediastinal silhouette, unchanged.  Aortic calcifications.    No acute osseous abnormality.  Impression: Bilateral lung opacities, increased on the left compared to prior study.    Electronically signed by: Aung Houston  MD  Date:    04/26/2024  Time:    09:18  US Scrotum And Testicles  Narrative: EXAMINATION:  US SCROTUM AND TESTICLES    CLINICAL HISTORY:  Pain, unspecified    TECHNIQUE:  Sonography of the scrotum and testes.  Grayscale, color Doppler and spectral Doppler evaluation.    COMPARISON:  03/17/2024    FINDINGS:  RIGHT TESTICLE:    Size: 3 x 2.4 x 1.9 cm    Appearance: Heterogeneous echotexture.  No discrete mass.    Flow: Normal arterial and venous flow    Epididymis: Normal.    Hydrocele: No    Varicocele: Yes    LEFT TESTICLE:    Absent    OTHER: Scrotal edema.  Impression: Mildly heterogeneous appearance of the right testicle.  Blood flow demonstrated.    Absent left testicle    Right varicocele    Scrotal edema    The preliminary and final reports are concordant.    Electronically signed by: Sailaja Atkinson  Date:    04/26/2024  Time:    07:32  CT Abdomen Pelvis With IV Contrast NO Oral Contrast  Narrative: EXAMINATION:  CT ABDOMEN PELVIS WITH IV CONTRAST    CLINICAL HISTORY:  Abdominal pain, acute, nonlocalized;    TECHNIQUE:  Low dose axial images, sagittal and coronal reformations were obtained from the lung bases to the pubic symphysis following the IV administration of 100 mL of Omnipaque 350 .  Oral contrast not given.  DLP 1368.  Automated exposure control used.    COMPARISON:  Ultrasound 04/26/2024    FINDINGS:  Liver demonstrates no focal lesion.    Gallbladder and biliary ductal system are normal.    Pancreas, stomach, spleen, adrenal glands are normal.    Kidneys demonstrate it normal enhancement.  There is asymmetric renal cortical scarring on the left.  No hydronephrosis.    There is a saccular aneurysm of the mid abdominal aorta measuring up to 3.8 cm diameter, stable.  Moderate severe atherosclerotic calcification.    Bowel loops normal with no thickening or dilation.    Mesentery normal with no inflammatory change or ascites.  No lymphadenopathy in the abdomen or pelvis.    Mild bladder wall  thickening.  Slight haziness along the bladder wall margin more significant posteriorly.  Rectum normal.    Severe generalized scrotal wall thickening.  Right orchectomy.  No definite organized fluid collection.  There appears to be a hydrocele.    Bones intact.    Subsegmental area of infiltrate posterior right lung base.  Mild interstitial thickening, hazy ground-glass density at the lung bases.  Impression: Severe scrotal wall thickening suggesting cellulitis.  Appears to be a hydrocele on the left with no organized fluid collection to suggest abscess.    Mild bladder wall thickening, small area of haziness along the posterior bladder margin.  Cystitis suspected.    Saccular abdominal aortic aneurysm 3.8 cm, stable.    Area of subsegmental infiltrate right lung base.  Mild interstitial edema.    Agree with [facility].    Electronically signed by: Katrin Milligan MD  Date:    04/26/2024  Time:    06:56      Reema Mari DO  Department of Hospital Medicine  [facility]  04/28/2024

## 2024-04-29 ENCOUNTER — EXTERNAL HOME HEALTH (OUTPATIENT)
Dept: HOME HEALTH SERVICES | Facility: HOSPITAL | Age: 69
End: 2024-04-29
Payer: MEDICARE

## 2024-04-29 PROBLEM — J96.21 ACUTE ON CHRONIC RESPIRATORY FAILURE WITH HYPOXIA: Status: RESOLVED | Noted: 2024-04-29 | Resolved: 2024-04-29

## 2024-04-29 PROBLEM — J96.21 ACUTE ON CHRONIC RESPIRATORY FAILURE WITH HYPOXIA: Status: ACTIVE | Noted: 2024-04-29

## 2024-04-29 LAB
ANION GAP SERPL CALC-SCNC: 9 MEQ/L
BACTERIA SPEC CULT: ABNORMAL
BACTERIA SPEC CULT: ABNORMAL
BUN SERPL-MCNC: 12.3 MG/DL (ref 8.4–25.7)
CALCIUM SERPL-MCNC: 8.8 MG/DL (ref 8.8–10)
CHLORIDE SERPL-SCNC: 102 MMOL/L (ref 98–107)
CO2 SERPL-SCNC: 29 MMOL/L (ref 23–31)
CREAT SERPL-MCNC: 0.87 MG/DL (ref 0.73–1.18)
CREAT/UREA NIT SERPL: 14
ERYTHROCYTE [DISTWIDTH] IN BLOOD BY AUTOMATED COUNT: 17.3 % (ref 11.5–17)
GFR SERPLBLD CREATININE-BSD FMLA CKD-EPI: >60 MLS/MIN/1.73/M2
GLUCOSE SERPL-MCNC: 98 MG/DL (ref 82–115)
GRAM STN SPEC: ABNORMAL
GRAM STN SPEC: ABNORMAL
HCT VFR BLD AUTO: 26.5 % (ref 42–52)
HGB BLD-MCNC: 8 G/DL (ref 14–18)
MAGNESIUM SERPL-MCNC: 1.9 MG/DL (ref 1.6–2.6)
MCH RBC QN AUTO: 29.1 PG (ref 27–31)
MCHC RBC AUTO-ENTMCNC: 30.2 G/DL (ref 33–36)
MCV RBC AUTO: 96.4 FL (ref 80–94)
NRBC BLD AUTO-RTO: 0 %
PLATELET # BLD AUTO: 218 X10(3)/MCL (ref 130–400)
PMV BLD AUTO: 10 FL (ref 7.4–10.4)
POTASSIUM SERPL-SCNC: 3.9 MMOL/L (ref 3.5–5.1)
RBC # BLD AUTO: 2.75 X10(6)/MCL (ref 4.7–6.1)
SODIUM SERPL-SCNC: 140 MMOL/L (ref 136–145)
WBC # SPEC AUTO: 5.38 X10(3)/MCL (ref 4.5–11.5)

## 2024-04-29 PROCEDURE — 63600175 PHARM REV CODE 636 W HCPCS: Performed by: STUDENT IN AN ORGANIZED HEALTH CARE EDUCATION/TRAINING PROGRAM

## 2024-04-29 PROCEDURE — S4991 NICOTINE PATCH NONLEGEND: HCPCS | Performed by: STUDENT IN AN ORGANIZED HEALTH CARE EDUCATION/TRAINING PROGRAM

## 2024-04-29 PROCEDURE — 36415 COLL VENOUS BLD VENIPUNCTURE: CPT | Performed by: STUDENT IN AN ORGANIZED HEALTH CARE EDUCATION/TRAINING PROGRAM

## 2024-04-29 PROCEDURE — 83735 ASSAY OF MAGNESIUM: CPT | Performed by: STUDENT IN AN ORGANIZED HEALTH CARE EDUCATION/TRAINING PROGRAM

## 2024-04-29 PROCEDURE — 85027 COMPLETE CBC AUTOMATED: CPT | Performed by: STUDENT IN AN ORGANIZED HEALTH CARE EDUCATION/TRAINING PROGRAM

## 2024-04-29 PROCEDURE — 25500020 PHARM REV CODE 255: Performed by: STUDENT IN AN ORGANIZED HEALTH CARE EDUCATION/TRAINING PROGRAM

## 2024-04-29 PROCEDURE — 94760 N-INVAS EAR/PLS OXIMETRY 1: CPT

## 2024-04-29 PROCEDURE — 80048 BASIC METABOLIC PNL TOTAL CA: CPT | Performed by: STUDENT IN AN ORGANIZED HEALTH CARE EDUCATION/TRAINING PROGRAM

## 2024-04-29 PROCEDURE — 25000003 PHARM REV CODE 250: Performed by: STUDENT IN AN ORGANIZED HEALTH CARE EDUCATION/TRAINING PROGRAM

## 2024-04-29 PROCEDURE — 27000221 HC OXYGEN, UP TO 24 HOURS

## 2024-04-29 PROCEDURE — 99900035 HC TECH TIME PER 15 MIN (STAT)

## 2024-04-29 PROCEDURE — 21400001 HC TELEMETRY ROOM

## 2024-04-29 RX ORDER — MAGNESIUM SULFATE 1 G/100ML
1 INJECTION INTRAVENOUS ONCE
Status: COMPLETED | OUTPATIENT
Start: 2024-04-29 | End: 2024-04-29

## 2024-04-29 RX ORDER — DOXYCYCLINE HYCLATE 100 MG
100 TABLET ORAL EVERY 12 HOURS
Qty: 6 TABLET | Refills: 0 | Status: SHIPPED | OUTPATIENT
Start: 2024-04-29 | End: 2024-05-02

## 2024-04-29 RX ADMIN — MIDODRINE HYDROCHLORIDE 10 MG: 5 TABLET ORAL at 05:04

## 2024-04-29 RX ADMIN — IOHEXOL 89 ML: 350 INJECTION, SOLUTION INTRAVENOUS at 02:04

## 2024-04-29 RX ADMIN — DOXYCYCLINE HYCLATE 100 MG: 100 TABLET, COATED ORAL at 09:04

## 2024-04-29 RX ADMIN — FOLIC ACID 1 MG: 1 TABLET ORAL at 10:04

## 2024-04-29 RX ADMIN — FUROSEMIDE 40 MG: 40 TABLET ORAL at 05:04

## 2024-04-29 RX ADMIN — FUROSEMIDE 40 MG: 40 TABLET ORAL at 10:04

## 2024-04-29 RX ADMIN — MIDODRINE HYDROCHLORIDE 10 MG: 5 TABLET ORAL at 01:04

## 2024-04-29 RX ADMIN — CEFTRIAXONE SODIUM 1 G: 1 INJECTION, POWDER, FOR SOLUTION INTRAMUSCULAR; INTRAVENOUS at 10:04

## 2024-04-29 RX ADMIN — ATORVASTATIN CALCIUM 80 MG: 40 TABLET, FILM COATED ORAL at 10:04

## 2024-04-29 RX ADMIN — MAGNESIUM SULFATE IN DEXTROSE 1 G: 10 INJECTION, SOLUTION INTRAVENOUS at 05:04

## 2024-04-29 RX ADMIN — PANTOPRAZOLE SODIUM 40 MG: 40 TABLET, DELAYED RELEASE ORAL at 09:04

## 2024-04-29 RX ADMIN — TRAZODONE HYDROCHLORIDE 50 MG: 50 TABLET ORAL at 09:04

## 2024-04-29 RX ADMIN — PIPERACILLIN AND TAZOBACTAM 4.5 G: 4; .5 INJECTION, POWDER, LYOPHILIZED, FOR SOLUTION INTRAVENOUS; PARENTERAL at 05:04

## 2024-04-29 RX ADMIN — MIDODRINE HYDROCHLORIDE 10 MG: 5 TABLET ORAL at 10:04

## 2024-04-29 RX ADMIN — RIVAROXABAN 20 MG: 10 TABLET, FILM COATED ORAL at 05:04

## 2024-04-29 RX ADMIN — DOXYCYCLINE HYCLATE 100 MG: 100 TABLET, COATED ORAL at 10:04

## 2024-04-29 RX ADMIN — PANTOPRAZOLE SODIUM 40 MG: 40 TABLET, DELAYED RELEASE ORAL at 10:04

## 2024-04-29 RX ADMIN — ASPIRIN 81 MG: 81 TABLET, COATED ORAL at 10:04

## 2024-04-29 RX ADMIN — NICOTINE 1 PATCH: 21 PATCH, EXTENDED RELEASE TRANSDERMAL at 10:04

## 2024-04-29 NOTE — PROGRESS NOTES
[facility] Hospital Medicine Progress Note        Chief Complaint: Inpatient Follow-up for     HPI:   Ran Reyes Jr. is a 68 y.o. Black or  male with a past medical history of hypertension, hyperlipidemia, atrial fibrillation on Xarelto, valvular heart disease, coronary artery disease status post stents, peripheral arterial disease with stent, congestive heart failure, aortic aneurysm, COPD L O2 at home and glaucoma.  Patient was recent admission to hospital medicine services from 03/17/2024 to 04/12/2024 for sepsis secondary to a scrotal abscess with component of early Claudine's with exploration, I and D and left orchectomy on 03/18/2024.  Hospital stay was complicated as patient had a cardiac arrest with ventricular tachycardia requiring defibrillation x3 on 03/18/2024 and developed a GI bleed. The patient presented to [facility] on 4/26/2024 with a primary complaint of bleeding from the right scrotum and abdominal pain which started today.  Patient reports having pain across the bilateral lower extremities in bilateral groin wrapping to the back.  Associated symptoms include shortness a breath, cough with intermittent blood-tinged sputum, chills.  He denies complaints of chest pain, nausea, vomiting and diarrhea.     Upon presentation to the ED, temperature 98.8° F, heart rate 66, blood pressure 124/66, respiratory rate 15 and SpO2 95% on room air. Labs with H&H 8.9/29.5, MCV 99.3, potassium 3.3, BUN/creatinine 11.4/1.19 (14.7/0.98 on 04/19/2024).  UA with trace squamous epithelial cells, few WBC clumps, occasional bacteria, greater than 100 WBCs, 21-50 RBCs, 500 leukocyte esterase, 2+ urobilinogen, 2+ blood, 4+ glucose and trace protein.  Ultrasound of the scrotum/testis revealed mild heterogeneous appearance of the right testicle with blood flow demonstrated, absent left testicle, right varicocele, scrotal edema.  CT abdomen and pelvis with severe scrotal wall  thickening suggesting cellulitis, appears to be a hydrocele on the left with no organized fluid collection to suggest abscess, mild bladder wall thickening some areas of haziness along the posterior bladder, cystitis is suspected, saccular abdominal aortic aneurysm of 3.8 cm which is stable an area of subsegmental infiltrate to the right lung base with mild interstitial edema.  In ED patient received Zosyn, vancomycin, 25 mEq of potassium chloride carbonate, Zofran and morphine.  Patient oxygen saturation decreased and he was placed on nasal cannula with titration up to OxyMask and non-rebreather at 15 L. RRT was called and patient was placed on BiPAP. Patient is admitted to hospital medicine services for further medical management.    Interval Hx:   Patient seen and examined by bedside.  No acute overnight events.  Continues to be on 2 L of OxyMask but oxygen saturations are 98%.  Denies any issues currently.  Case was discussed with patient's nurse and  on the floor.    Objective/physical exam:  General: In no acute distress, afebrile  Chest: Clear to auscultation bilaterally  Heart: RRR, +S1, S2, no appreciable murmur  Abdomen: Soft, nontender, BS +  MSK: Warm, no lower extremity edema, no clubbing or cyanosis  Neurologic: Alert and oriented x4, Cranial nerve II-XII intact, Strength 5/5 in all 4 extremities    VITAL SIGNS: 24 HRS MIN & MAX LAST   Temp  Min: 97.4 °F (36.3 °C)  Max: 98.8 °F (37.1 °C) 98.2 °F (36.8 °C)   BP  Min: 98/60  Max: 119/76 114/81   Pulse  Min: 56  Max: 81  80   Resp  Min: 18  Max: 18 18   SpO2  Min: 95 %  Max: 97 % 95 %     I have reviewed the following labs:  Recent Labs   Lab 04/27/24  0355 04/28/24  0828 04/29/24  0753   WBC 6.60 6.58 5.38   RBC 2.55* 2.63* 2.75*   HGB 7.6* 7.7* 8.0*   HCT 24.8* 25.6* 26.5*   MCV 97.3* 97.3* 96.4*   MCH 29.8 29.3 29.1   MCHC 30.6* 30.1* 30.2*   RDW 18.1* 17.6* 17.3*    203 218   MPV 10.2 9.8 10.0     Recent Labs   Lab 04/26/24  0142  04/27/24  0355 04/28/24  0828 04/29/24  1347    138 136 140   K 3.3* 3.8 3.7 3.9   CO2 21* 27 26 29   BUN 11.4 12.1 12.7 12.3   CREATININE 1.19* 0.93 0.85 0.87   CALCIUM 8.8 8.4* 8.4* 8.8   MG  --  1.70 2.00 1.90   ALBUMIN 3.3* 3.0* 3.0*  --    ALKPHOS 141 116 112  --    ALT 32 24 20  --    AST 27 20 18  --    BILITOT 1.4 2.5* 1.6*  --      Microbiology Results (last 7 days)       Procedure Component Value Units Date/Time    Blood Culture [[id number]]  (Normal) Collected: 04/26/24 1011    Order Status: Completed Specimen: Blood from Arm, Left Updated: 04/29/24 1301     CULTURE, BLOOD ([facility]) No Growth At 72 Hours    Blood Culture [[id number]]  (Normal) Collected: 04/26/24 1011    Order Status: Completed Specimen: Blood from Arm, Right Updated: 04/29/24 1301     CULTURE, BLOOD ([facility]) No Growth At 72 Hours    Respiratory Culture [[id number]]  (Abnormal)  (Susceptibility) Collected: 04/26/24 2132    Order Status: Completed Specimen: Sputum, Expectorated Updated: 04/29/24 0959     Respiratory Culture Moderate Escherichia coli ESBL      Moderate Yeast     Comment: with normal respiratory jt        GRAM STAIN Quality 1+      Rare Yeast    Urine culture [[id number]]  (Abnormal) Collected: 04/26/24 0653    Order Status: Completed Specimen: Urine Updated: 04/28/24 1002     Urine Culture Multiple organisms present indicate probable contamination. Recommend recollection.      10,000 - 25,000 colonies/ml Gram-negative Rods      10,000 - 25,000 colonies/ml Gram-positive Cocci      10,000 - 25,000 colonies/ml Gram-positive Rods             See below for Radiology    Scheduled Med:  Current Facility-Administered Medications   Medication Dose Route Frequency    aspirin  81 mg Oral Daily    atorvastatin  80 mg Oral Daily    cefTRIAXone (Rocephin) IV (PEDS and ADULTS)  1 g Intravenous Q24H    doxycycline  100 mg Oral Q12H    folic acid  1 mg Oral Daily    furosemide  40 mg Oral BID    midodrine  10 mg Oral TID WM    nicotine  1  patch Transdermal Daily    pantoprazole  40 mg Oral BID    rivaroxaban  20 mg Oral with dinner    traZODone  50 mg Oral QHS      Continuous Infusions:  Current Facility-Administered Medications   Medication Dose Route Frequency Last Rate Last Admin      PRN Meds:    Current Facility-Administered Medications:     acetaminophen, 650 mg, Oral, Q6H PRN    ALPRAZolam, 0.25 mg, Oral, TID PRN    aluminum-magnesium hydroxide-simethicone, 30 mL, Oral, QID PRN    hydrOXYzine HCL, 50 mg, Oral, BID PRN    melatonin, 6 mg, Oral, Nightly PRN    naloxone, 0.02 mg, Intravenous, PRN    ondansetron, 4 mg, Intravenous, Q4H PRN    polyethylene glycol, 17 g, Oral, BID PRN    prochlorperazine, 5 mg, Intravenous, Q6H PRN    simethicone, 1 tablet, Oral, QID PRN     Assessment/Plan:  Scrotal cellulitis  Acute on chronic hypoxic respiratory failure patient chronically on 2 L at home, improved  Acute on chronic CHF exacerbation, improving  Recent scrotal abscess status post left orchiectomy  Bilateral lung opacities, concern for hospital-acquired pneumonias patient was recently admitted   Mild hypokalemia  Acute on chronic blood loss anemia, unclear etiology at this time, stable  Acute bacterial cystitis  Saccular abdominal aortic aneurism, 3.8 cm, stable   History of hypertension, hyperlipidemia, AFib on Xarelto, valvular heart disease, COPD     Vancomycin stopped on 04/27 after MRSA PCR negative   Zosyn discontinued after 3 days  We will deescalate to Rocephin at this time and add doxycycline for further atypical coverage and for cellulitis  Blood cultures normal, urine culture shows minimal growth of organisms  Respiratory culture showing Gram-negative rods.  Hemoglobin remaining stable at this time  Patient denies any worsening scrotal bleeding or melena or hematochezia  Patient had recent EGD done last month that LA grade B reflux esophagitis with no bleeding, chronic gastritis, and chronic duodenitis  PPI b.i.d. continued   Discontinue  IV Lasix and restart oral home Lasix of 40 mg b.i.d.  Recent angiogram showed nonobstructive CAD  Continue Xarelto at this time  Reassess in a.m. for further need of diuresis  Accurate Is&Os, strict daily weights  Echo obtained on 03/27 showed EF of 50-55% with moderate mitral regurgitation  Wean oxygenation to maintain greater than 90%, we will have patient go back to his 2 L of nasal cannula Ca tolerates   Wound care nurse noted worsening scrotal swelling with possible abscess formation  We will repeat CT pelvis for abscess concerns, we will consult Urology if abscess present   Twelve be nonsustained V-tach noted tele, labs ordered and patient's electrolytes are largely stable.  We will go ahead and give 1 g by back.  Further recs based on clinical course   Labs in a.m.    VTE prophylaxis:  On Xarelto    Patient condition:  Stable/Fair/Guarded/ Serious/ Critical    Anticipated discharge and Disposition:         All diagnosis and differential diagnosis have been reviewed; assessment and plan has been documented; I have personally reviewed the labs and test results that are presently available; I have reviewed the patients medication list; I have reviewed the consulting providers response and recommendations. I have reviewed or attempted to review medical records based upon their availability    All of the patient's questions have been  addressed and answered. Patient's is agreeable to the above stated plan. I will continue to monitor closely and make adjustments to medical management as needed.  _____________________________________________________________________    Nutrition Status:    Radiology:  I have personally reviewed the following imaging and agree with the radiologist.     X-Ray Chest 1 View  Narrative: EXAMINATION:  XR CHEST 1 VIEW    CLINICAL HISTORY:  Shortness of breath    TECHNIQUE:  Single frontal view of the chest was performed.    COMPARISON:  Frontal chest radiograph,  04/19/2024.    FINDINGS:  Bilateral lung opacities, slightly increased in the left mid and upper lung zone compared to prior study.  Trace pleural effusion on the left.  Right costophrenic angle is obscured.  No pneumothorax.    Enlarged cardiomediastinal silhouette, unchanged.  Aortic calcifications.    No acute osseous abnormality.  Impression: Bilateral lung opacities, increased on the left compared to prior study.    Electronically signed by: Aung Houston MD  Date:    04/26/2024  Time:    09:18  US Scrotum And Testicles  Narrative: EXAMINATION:  US SCROTUM AND TESTICLES    CLINICAL HISTORY:  Pain, unspecified    TECHNIQUE:  Sonography of the scrotum and testes.  Grayscale, color Doppler and spectral Doppler evaluation.    COMPARISON:  03/17/2024    FINDINGS:  RIGHT TESTICLE:    Size: 3 x 2.4 x 1.9 cm    Appearance: Heterogeneous echotexture.  No discrete mass.    Flow: Normal arterial and venous flow    Epididymis: Normal.    Hydrocele: No    Varicocele: Yes    LEFT TESTICLE:    Absent    OTHER: Scrotal edema.  Impression: Mildly heterogeneous appearance of the right testicle.  Blood flow demonstrated.    Absent left testicle    Right varicocele    Scrotal edema    The preliminary and final reports are concordant.    Electronically signed by: Sailaja Atkinson  Date:    04/26/2024  Time:    07:32  CT Abdomen Pelvis With IV Contrast NO Oral Contrast  Narrative: EXAMINATION:  CT ABDOMEN PELVIS WITH IV CONTRAST    CLINICAL HISTORY:  Abdominal pain, acute, nonlocalized;    TECHNIQUE:  Low dose axial images, sagittal and coronal reformations were obtained from the lung bases to the pubic symphysis following the IV administration of 100 mL of Omnipaque 350 .  Oral contrast not given.  DLP 1368.  Automated exposure control used.    COMPARISON:  Ultrasound 04/26/2024    FINDINGS:  Liver demonstrates no focal lesion.    Gallbladder and biliary ductal system are normal.    Pancreas, stomach, spleen, adrenal glands are  normal.    Kidneys demonstrate it normal enhancement.  There is asymmetric renal cortical scarring on the left.  No hydronephrosis.    There is a saccular aneurysm of the mid abdominal aorta measuring up to 3.8 cm diameter, stable.  Moderate severe atherosclerotic calcification.    Bowel loops normal with no thickening or dilation.    Mesentery normal with no inflammatory change or ascites.  No lymphadenopathy in the abdomen or pelvis.    Mild bladder wall thickening.  Slight haziness along the bladder wall margin more significant posteriorly.  Rectum normal.    Severe generalized scrotal wall thickening.  Right orchectomy.  No definite organized fluid collection.  There appears to be a hydrocele.    Bones intact.    Subsegmental area of infiltrate posterior right lung base.  Mild interstitial thickening, hazy ground-glass density at the lung bases.  Impression: Severe scrotal wall thickening suggesting cellulitis.  Appears to be a hydrocele on the left with no organized fluid collection to suggest abscess.    Mild bladder wall thickening, small area of haziness along the posterior bladder margin.  Cystitis suspected.    Saccular abdominal aortic aneurysm 3.8 cm, stable.    Area of subsegmental infiltrate right lung base.  Mild interstitial edema.    Agree with nighthawk.    Electronically signed by: Katrin Milligan MD  Date:    04/26/2024  Time:    06:56      Reema Mari DO  Department of Hospital Medicine  [facility]  04/29/2024

## 2024-04-29 NOTE — NURSING
"Subjective:      Patient ID: Ran Reyes Jr. is a 68 y.o. male.    Chief Complaint: Groin Swelling (C/o right groin pain. Recent ,left "groin surgery" and pain with bleeding x 30 min ago. Specified that it was scrotal surgery. Pt with pain and "bleeding" otnight to right scrotum. Small amount of bleeding/pain to scrotum at home. Surgery in March got hypotensive and coded, 2 weeks in ICU)    HPI  Review of Systems   Objective:     Physical Exam   Assessment:     1. VHD (valvular heart disease)    2. Pain    3. Hypokalemia    4. Chronic anemia    5. Suprapubic abdominal pain    6. Hospital-acquired pneumonia    7. Acute on chronic respiratory failure with hypoxia    8. Chest pain    9. Other specified disorders of the male genital organs           Incision/Site 03/18/24 1649 Left Scrotum lateral vertical (Active)   03/18/24 1649   Present Prior to Hospital Arrival?:    Side: Left   Location: Scrotum   Orientation: lateral   Incision Type: vertical   Closure Method: Other (see comments)   Additional Comments: left open - packed with dakins soaked kerlix, fluffs, ABD pad, and mesh panties   Removal Indication and Assessment:    Wound Outcome:    Removal Indications:    Wound Image   04/29/24 1144   Dressing Appearance Intact;Moist drainage 04/29/24 1144   Drainage Amount Small 04/29/24 1144   Drainage Characteristics/Odor Serous;No odor 04/29/24 1144   Appearance Red;Moist 04/29/24 1144   Red (%), Wound Tissue Color 100 % 04/29/24 1144   Periwound Area Dry;Intact 04/29/24 1144   Wound Edges Irregular 04/29/24 1144   Wound Length (cm) 7 cm 04/29/24 1144   Wound Width (cm) 3 cm 04/29/24 1144   Wound Depth (cm) 0.5 cm 04/29/24 1144   Wound Volume (cm^3) 10.5 cm^3 04/29/24 1144   Wound Surface Area (cm^2) 21 cm^2 04/29/24 1144   Care Cleansed with:;Antimicrobial agent 04/29/24 1144   Dressing Gauze, wet to dry 04/29/24 1144       Plan:        69 y/o male awake alert oriented and mobil.  He was just released from " hospital less than a week ago returned with bleeding and pain to groin area-see new photo- and note swelling to superior aspect.     Care resumed with vashe wet to dry dressings and spoke with nurse Loaiza regarding urology notification.    Will check on him every few days while in hospital.

## 2024-04-29 NOTE — PLAN OF CARE
04/29/24 0956   Discharge Assessment   Assessment Type Discharge Planning Assessment   Confirmed/corrected address, phone number and insurance Yes   Confirmed Demographics Correct on Facesheet   Source of Information patient   Communicated ZEINAB with patient/caregiver Date not available/Unable to determine   Reason For Admission 68 y.o. Black or  male with a past medical history of hypertension, hyperlipidemia, atrial fibrillation on Xarelto, valvular heart disease, coronary artery disease status post stents, peripheral arterial disease with stent, congestive heart failure, aortic aneurysm, COPD L O2 at home and glaucoma.  Patient was recent admission to hospital medicine services from 03/17/2024 to 04/12/2024 for sepsis secondary to a scrotal abscess with component of early Claudine's with exploration, I and D and left orchectomy on 03/18/2024.  Hospital stay was complicated as patient had a cardiac arrest with ventricular tachycardia requiring defibrillation x3 on 03/18/2024 and developed a GI bleed   People in Home alone   Facility Arrived From: Private residence.   Do you expect to return to your current living situation? Yes   Do you have help at home or someone to help you manage your care at home? Yes   Who are your caregiver(s) and their phone number(s)? Sister: Nina Olsen (Sister)  [phone] (Mobile)   Prior to hospitilization cognitive status: Alert/Oriented   Current cognitive status: Alert/Oriented   Walking or Climbing Stairs Difficulty yes   Walking or Climbing Stairs ambulation difficulty, requires equipment   Mobility Management The patient uses a straight cane for mobility concerns.   Dressing/Bathing Difficulty no   Home Accessibility wheelchair accessible  (The patient's home is built on a slab.)   Home Layout Able to live on 1st floor   Equipment Currently Used at Home oxygen;cane, straight  (The patient has home O2 (Portable and O2 Concentrator); unable to recall his  provider for O2 equipment/supplies.)   Readmission within 30 days? Yes   Patient currently being followed by outpatient case management? No   Do you currently have service(s) that help you manage your care at home? Yes   Name and Contact number of agency The patient is active with home health services through: [facility]. He will resume services upon discahrge.   Is the pt/caregiver preference to resume services with current agency Yes   Do you take prescription medications? Yes   Do you have prescription coverage? Yes   Coverage Payor: [employer] [facility] -   Do you have any problems affording any of your prescribed medications? No   Is the patient taking medications as prescribed? yes   Who is going to help you get home at discharge? Nina Olsen (Sister)  [phone] (Mobile)   How do you get to doctors appointments? family or friend will provide   Are you on dialysis? No   Do you take coumadin? No   Discharge Plan A Home Health  (FOC: The patient is active with home health services through: [facility]. He will resume HH services with this agency upon discharge.)   Discharge Plan B Home Health   DME Needed Upon Discharge  none   Discharge Plan discussed with: Patient   Transition of Care Barriers None   Social Connections   In a typical week, how many times do you talk on the phone with family, friends, or neighbors? Three   How often do you get together with friends or relatives? Three times   How often do you attend Quaker or Sikh services? Never   Do you belong to any clubs or organizations such as Quaker groups, unions, fraternal or athletic groups, or school groups? No   How often do you attend meetings of the clubs or organizations you belong to? Never   Are you , , , , never , or living with a partner? Never marrie   Alcohol Use   Q1: How often do you have a drink containing alcohol? 2-4 pr month   Q2: How many drinks containing alcohol  do you have on a typical day when you are drinking? 1 or 2   Q3: How often do you have six or more drinks on one occasion? Never

## 2024-04-30 VITALS
TEMPERATURE: 98 F | DIASTOLIC BLOOD PRESSURE: 67 MMHG | RESPIRATION RATE: 20 BRPM | BODY MASS INDEX: 28 KG/M2 | HEART RATE: 73 BPM | OXYGEN SATURATION: 95 % | HEIGHT: 71 IN | WEIGHT: 200 LBS | SYSTOLIC BLOOD PRESSURE: 104 MMHG

## 2024-04-30 PROCEDURE — 63600175 PHARM REV CODE 636 W HCPCS: Performed by: STUDENT IN AN ORGANIZED HEALTH CARE EDUCATION/TRAINING PROGRAM

## 2024-04-30 PROCEDURE — S4991 NICOTINE PATCH NONLEGEND: HCPCS | Performed by: STUDENT IN AN ORGANIZED HEALTH CARE EDUCATION/TRAINING PROGRAM

## 2024-04-30 PROCEDURE — 25000003 PHARM REV CODE 250: Performed by: STUDENT IN AN ORGANIZED HEALTH CARE EDUCATION/TRAINING PROGRAM

## 2024-04-30 RX ORDER — AMOXICILLIN AND CLAVULANATE POTASSIUM 875; 125 MG/1; MG/1
1 TABLET, FILM COATED ORAL 2 TIMES DAILY
Qty: 6 TABLET | Refills: 0 | Status: SHIPPED | OUTPATIENT
Start: 2024-04-30 | End: 2024-05-03

## 2024-04-30 RX ADMIN — MIDODRINE HYDROCHLORIDE 10 MG: 5 TABLET ORAL at 12:04

## 2024-04-30 RX ADMIN — ASPIRIN 81 MG: 81 TABLET, COATED ORAL at 09:04

## 2024-04-30 RX ADMIN — NICOTINE 1 PATCH: 21 PATCH, EXTENDED RELEASE TRANSDERMAL at 09:04

## 2024-04-30 RX ADMIN — PIPERACILLIN AND TAZOBACTAM 4.5 G: 4; .5 INJECTION, POWDER, LYOPHILIZED, FOR SOLUTION INTRAVENOUS; PARENTERAL at 09:04

## 2024-04-30 RX ADMIN — PANTOPRAZOLE SODIUM 40 MG: 40 TABLET, DELAYED RELEASE ORAL at 09:04

## 2024-04-30 RX ADMIN — DOXYCYCLINE HYCLATE 100 MG: 100 TABLET, COATED ORAL at 09:04

## 2024-04-30 RX ADMIN — MIDODRINE HYDROCHLORIDE 10 MG: 5 TABLET ORAL at 09:04

## 2024-04-30 RX ADMIN — ATORVASTATIN CALCIUM 80 MG: 40 TABLET, FILM COATED ORAL at 09:04

## 2024-04-30 RX ADMIN — FUROSEMIDE 40 MG: 40 TABLET ORAL at 09:04

## 2024-04-30 RX ADMIN — FOLIC ACID 1 MG: 1 TABLET ORAL at 09:04

## 2024-04-30 RX ADMIN — PIPERACILLIN AND TAZOBACTAM 4.5 G: 4; .5 INJECTION, POWDER, LYOPHILIZED, FOR SOLUTION INTRAVENOUS; PARENTERAL at 12:04

## 2024-04-30 NOTE — NURSING
Pt AAOx4, VSS, educated on discharge instructions, new medications, follow up appointments, and signs and symptoms to return to the ED with. All questions answered. Pt verbalized understanding. Peripheral IV discontinued and gauze/tape applied to site with no signs of bleeding or swelling noted. Telemetry monitor discontinued and returned to box. Pt wheeled down via transport to personal transportation provided by  at bedside.

## 2024-04-30 NOTE — PLAN OF CARE
04/30/24 1545   Final Note   Anticipated Discharge Disposition Home-Health  (FOC: The patient will resume home health services through: [facility] as he was active with this agency prior to this hospitalization.)   Post-Acute Status   Post-Acute Authorization Home Health   Home Health Status Set-up Complete/Auth obtained   Coverage Payor: [employer] [facility] [employer] -   Patient choice form signed by patient/caregiver List with quality metrics by geographic area provided   Discharge Delays None known at this time     The patient will be discharged from [facility] to his private residence with home health services through [facility]. He was active with this HH agency and will resume services upon D/C home this afternoon. Clinical notes/updates and AVS and D/C Summary will be sent by BENJAMIN Spencer via [employer]. The patient was informed of the above discharge plan and he is in agreement. Transportation will be arranged by his family member's personal vehicle.

## 2024-04-30 NOTE — DISCHARGE SUMMARY
[facility] Hospital Medicine Discharge Summary    Admit Date: 4/26/2024  Discharge Date and Time: 4/30/202410:20 AM  Admitting Physician:  Team  Discharging Physician: Reema Mari DO.  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 is a 68 y.o. Black or  male with a past medical history of hypertension, hyperlipidemia, atrial fibrillation on Xarelto, valvular heart disease, coronary artery disease status post stents, peripheral arterial disease with stent, congestive heart failure, aortic aneurysm, COPD L O2 at home and glaucoma.  Patient was recent admission to hospital medicine services from 03/17/2024 to 04/12/2024 for sepsis secondary to a scrotal abscess with component of early Claudine's with exploration, I and D and left orchectomy on 03/18/2024.  Hospital stay was complicated as patient had a cardiac arrest with ventricular tachycardia requiring defibrillation x3 on 03/18/2024 and developed a GI bleed. The patient presented to [facility] on 4/26/2024 with a primary complaint of bleeding from the right scrotum and abdominal pain which started today.  Patient reports having pain across the bilateral lower extremities in bilateral groin wrapping to the back.  Associated symptoms include shortness a breath, cough with intermittent blood-tinged sputum, chills.  He denies complaints of  chest pain, nausea, vomiting and diarrhea.     Upon presentation to the ED, temperature 98.8° F, heart rate 66, blood pressure 124/66, respiratory rate 15 and SpO2 95% on room air. Labs with H&H 8.9/29.5, MCV 99.3, potassium 3.3, BUN/creatinine 11.4/1.19 (14.7/0.98 on 04/19/2024).  UA with trace squamous epithelial cells, few WBC clumps, occasional bacteria, greater than 100 WBCs, 21-50 RBCs, 500 leukocyte esterase, 2+ urobilinogen, 2+ blood, 4+ glucose and trace protein.  Ultrasound of the scrotum/testis revealed mild heterogeneous appearance of the right testicle with blood flow demonstrated, absent left testicle, right varicocele, scrotal edema.  CT abdomen and pelvis with severe scrotal wall thickening suggesting cellulitis, appears to be a hydrocele on the left with no organized fluid collection to suggest abscess, mild bladder wall thickening some areas of haziness along the posterior bladder, cystitis is suspected, saccular abdominal aortic aneurysm of 3.8 cm which is stable an area of subsegmental infiltrate to the right lung base with mild interstitial edema.  In ED patient received Zosyn, vancomycin, 25 mEq of potassium chloride carbonate, Zofran and morphine.  Patient oxygen saturation decreased and he was placed on nasal cannula with titration up to OxyMask and non-rebreather at 15 L. RRT was called and patient was placed on BiPAP. Patient is admitted to hospital medicine services for further medical management.    Vancomycin stopped on 04/27 after MRSA PCR negative   Zosyn discontinued after 3 days  Antibiotics were further de-escalated to Rocephin and doxycycline was added for further atypical coverage and also for cellulitis.  Blood cultures normal, urine culture shows minimal growth of organisms  Respiratory culture showing moderate E coli.  Sensitivities showed patient was resistant to Rocephin and antibiotics were changed to Zosyn.  Hemoglobin remaining stable at this time  Patient denies any  worsening scrotal bleeding or melena or hematochezia  Patient had recent EGD done last month that LA grade B reflux esophagitis with no bleeding, chronic gastritis, and chronic duodenitis  PPI b.i.d. continued   Discontinue IV Lasix and restart oral home Lasix of 40 mg b.i.d.  Recent angiogram showed nonobstructive CAD  Continue Xarelto at this time  Echo obtained on 03/27 showed EF of 50-55% with moderate mitral regurgitation  Patient was weaned back to normal O2 requirements of 2 L and was saturating at 98%.  Denied any worsening shortness a breath or breathing.  Wound care nurse noted worsening scrotal swelling with possible abscess formation.  Scrotal ultrasound was done which showed no definite abscess formation.  Patient had runs of nonsustained V-tach and labs were checked and was given 2 g of Mag.  Patient was asymptomatic and denied any chest pain or palpitations.  Patient has upcoming follow up with Urology, his PCP, and general surgery closely after discharge.  No further acute concerns at this time.  Labs and vitals stable on day of discharge.  Denied any further concerns and ready to go home.  We will have him finish his course of antibiotics for cellulitis and pneumonia with Augmentin and doxycycline.      Pt was seen and examined on the day of discharge  Vitals:  VITAL SIGNS: 24 HRS MIN & MAX LAST   Temp  Min: 97.7 °F (36.5 °C)  Max: 98.2 °F (36.8 °C) 97.9 °F (36.6 °C)   BP  Min: 100/67  Max: 118/75 100/67   Pulse  Min: 57  Max: 82  77   Resp  Min: 18  Max: 20 20   SpO2  Min: 89 %  Max: 100 % 96 %       Physical Exam:  General: In no acute distress, afebrile  Chest: Clear to auscultation bilaterally  Heart: RRR, +S1, S2, no appreciable murmur  Abdomen: Soft, nontender, BS +  MSK: Warm, no lower extremity edema, no clubbing or cyanosis  Neurologic: Alert and oriented x4, Cranial nerve II-XII intact, Strength 5/5 in all 4 extremities    Procedures Performed: No admission procedures for hospital  encounter.     Significant Diagnostic Studies: See Full reports for all details    Recent Labs   Lab 04/27/24  0355 04/28/24  0828 04/29/24  0753   WBC 6.60 6.58 5.38   RBC 2.55* 2.63* 2.75*   HGB 7.6* 7.7* 8.0*   HCT 24.8* 25.6* 26.5*   MCV 97.3* 97.3* 96.4*   MCH 29.8 29.3 29.1   MCHC 30.6* 30.1* 30.2*   RDW 18.1* 17.6* 17.3*    203 218   MPV 10.2 9.8 10.0       Recent Labs   Lab 04/26/24  0143 04/27/24  0355 04/28/24  0828 04/29/24  1347    138 136 140   K 3.3* 3.8 3.7 3.9   CO2 21* 27 26 29   BUN 11.4 12.1 12.7 12.3   CREATININE 1.19* 0.93 0.85 0.87   CALCIUM 8.8 8.4* 8.4* 8.8   MG  --  1.70 2.00 1.90   ALBUMIN 3.3* 3.0* 3.0*  --    ALKPHOS 141 116 112  --    ALT 32 24 20  --    AST 27 20 18  --    BILITOT 1.4 2.5* 1.6*  --         Microbiology Results (last 7 days)       Procedure Component Value Units Date/Time    Blood Culture [[id number]]  (Normal) Collected: 04/26/24 1011    Order Status: Completed Specimen: Blood from Arm, Left Updated: 04/29/24 1301     CULTURE, BLOOD ([facility]) No Growth At 72 Hours    Blood Culture [[id number]]  (Normal) Collected: 04/26/24 1011    Order Status: Completed Specimen: Blood from Arm, Right Updated: 04/29/24 1301     CULTURE, BLOOD ([facility]) No Growth At 72 Hours    Respiratory Culture [[id number]]  (Abnormal)  (Susceptibility) Collected: 04/26/24 2132    Order Status: Completed Specimen: Sputum, Expectorated Updated: 04/29/24 0959     Respiratory Culture Moderate Escherichia coli ESBL      Moderate Yeast     Comment: with normal respiratory jt        GRAM STAIN Quality 1+      Rare Yeast    Urine culture [[id number]]  (Abnormal) Collected: 04/26/24 0653    Order Status: Completed Specimen: Urine Updated: 04/28/24 1002     Urine Culture Multiple organisms present indicate probable contamination. Recommend recollection.      10,000 - 25,000 colonies/ml Gram-negative Rods      10,000 - 25,000 colonies/ml Gram-positive Cocci      10,000 - 25,000 colonies/ml  Gram-positive Rods             US Scrotum And Testicles  Narrative: EXAMINATION:  Ultrasound scrotum and testicles    CLINICAL HISTORY:  Suspected abscess, history of left orchiectomy    COMPARISON:  04/26/2024    FINDINGS:  The right testicle measures 3.1 x 1.8 x 2.5 cm.  The right testicle demonstrates normal flow.  There is trace right hydrocele.  No intratesticular lesion.  The right testicle is mildly heterogeneous.  No disc significant increased vascularity.  Right epididymis appears unremarkable.  Left testicle surgically absent.  No fluid collection or abscess seen  Impression: No fluid collection or abscess evident.    Status post left orchiectomy    Electronically signed by: Tony Bullard MD  Date:    04/30/2024  Time:    09:22         Medication List        START taking these medications      amoxicillin-clavulanate 875-125mg 875-125 mg per tablet  Commonly known as: AUGMENTIN  Take 1 tablet by mouth 2 (two) times daily. for 3 days     doxycycline 100 MG tablet  Commonly known as: VIBRA-TABS  Take 1 tablet (100 mg total) by mouth every 12 (twelve) hours. for 3 days            CONTINUE taking these medications      ALPRAZolam 0.25 MG tablet  Commonly known as: XANAX  Take 1 tablet (0.25 mg total) by mouth 3 (three) times daily as needed for Anxiety.     aspirin 81 MG EC tablet  Commonly known as: ECOTRIN  Take 1 tablet (81 mg total) by mouth once daily.     atorvastatin 80 MG tablet  Commonly known as: LIPITOR  Take 1 tablet (80 mg total) by mouth once daily.     cetirizine 10 MG tablet  Commonly known as: ZYRTEC  Take 1 tablet (10 mg total) by mouth once daily. for 14 days     empagliflozin 10 mg tablet  Commonly known as: Jardiance  Take 1 tablet (10 mg total) by mouth once daily.     folic acid 1 MG tablet  Commonly known as: FOLVITE  Take 1 tablet (1 mg total) by mouth once daily.     furosemide 40 MG tablet  Commonly known as: LASIX  Take 1 tablet (40 mg total) by mouth 2 (two) times a day.      hydrOXYzine HCL 10 MG Tab  Commonly known as: ATARAX     metoprolol succinate 25 MG 24 hr tablet  Commonly known as: TOPROL-XL  Take 0.5 tablets (12.5 mg total) by mouth once daily.     midodrine 10 MG tablet  Commonly known as: PROAMATINE  Take 1 tablet (10 mg total) by mouth 3 (three) times daily with meals.     pantoprazole 40 MG tablet  Commonly known as: PROTONIX  Take 1 tablet (40 mg total) by mouth 2 (two) times a day.     rivaroxaban 20 mg Tab  Commonly known as: XARELTO     spironolactone 25 MG tablet  Commonly known as: ALDACTONE  Take 1 tablet (25 mg total) by mouth once daily.     triamcinolone acetonide 0.1% 0.1 % cream  Commonly known as: KENALOG               Where to Get Your Medications        These medications were sent to [address] [address]      Phone: [phone]   amoxicillin-clavulanate 875-125mg 875-125 mg per tablet  doxycycline 100 MG tablet          Explained in detail to the patient about the discharge plan, medications, and follow-up visits. Pt understands and agrees with the treatment plan  Discharge Disposition: Home or Self Care   Discharged Condition: stable  Diet-   Dietary Orders (From admission, onward)       Start     Ordered    04/26/24 0615  Diet Heart Healthy  (Diet/Nutrition - [facility])  Diet effective now         04/26/24 0616                   Medications Per DC med rec  Activities as tolerated   Contact information for follow-up providers       Abiodun Recinos DO Follow up on 5/13/2024.    Specialty: Internal Medicine  Why: 12:50  Contact information:  [address] [facility] [place] [address]  [phone]               Dayanna Johnson MD Follow up on 5/6/2024.    Specialty: General Surgery  Why: 2pm  Contact information:  [address] [place] [id number] [phone]               Jo-Ann Parks DO Follow up on 5/15/2024.    Specialty: Urology  Why: 2:00  pm  Contact information:  [address] [facility] [address]  [phone]                       Contact information for after-discharge care       Home Medical Care       [facility] .    Service: Home Health Services  Contact information:  [address] [facility]  [address]  [facility] [address]  [phone]                                   For further questions contact hospitalist office    Discharge time 33 minutes    For worsening symptoms, chest pain, shortness of breath, increased abdominal pain, high grade fever, stroke or stroke like symptoms, immediately go to the nearest Emergency Room or call 911 as soon as possible.      Reema Mari DO  Department of Hospital Medicine  [facility]  04/30/2024

## 2024-05-01 LAB
BACTERIA BLD CULT: NORMAL
BACTERIA BLD CULT: NORMAL

## 2024-05-03 ENCOUNTER — DOCUMENT SCAN (OUTPATIENT)
Dept: HOME HEALTH SERVICES | Facility: HOSPITAL | Age: 69
End: 2024-05-03
Payer: MEDICARE

## 2024-05-06 ENCOUNTER — OFFICE VISIT (OUTPATIENT)
Dept: CARDIOLOGY | Facility: CLINIC | Age: 69
End: 2024-05-06
Payer: MEDICARE

## 2024-05-06 VITALS
WEIGHT: 191 LBS | HEART RATE: 60 BPM | SYSTOLIC BLOOD PRESSURE: 102 MMHG | OXYGEN SATURATION: 100 % | DIASTOLIC BLOOD PRESSURE: 64 MMHG | TEMPERATURE: 98 F | RESPIRATION RATE: 18 BRPM | HEIGHT: 72 IN | BODY MASS INDEX: 25.87 KG/M2

## 2024-05-06 DIAGNOSIS — R09.89 CAROTID BRUIT, UNSPECIFIED LATERALITY: ICD-10-CM

## 2024-05-06 DIAGNOSIS — I50.9 CHRONIC CONGESTIVE HEART FAILURE, UNSPECIFIED HEART FAILURE TYPE: Primary | ICD-10-CM

## 2024-05-06 DIAGNOSIS — I47.20 VENTRICULAR TACHYCARDIA: ICD-10-CM

## 2024-05-06 PROCEDURE — 99215 OFFICE O/P EST HI 40 MIN: CPT | Mod: PBBFAC | Performed by: INTERNAL MEDICINE

## 2024-05-06 RX ORDER — DIAZEPAM 5 MG/1
5 TABLET ORAL ONCE
Status: ACTIVE | OUTPATIENT
Start: 2024-05-06

## 2024-05-06 RX ORDER — SODIUM CHLORIDE 0.9 % (FLUSH) 0.9 %
10 SYRINGE (ML) INJECTION EVERY 6 HOURS
Status: SHIPPED | OUTPATIENT
Start: 2024-05-06

## 2024-05-06 RX ORDER — DIPHENHYDRAMINE HCL 25 MG
25 CAPSULE ORAL ONCE
Status: CANCELLED | OUTPATIENT
Start: 2024-05-06 | End: 2024-05-06

## 2024-05-06 RX ORDER — SODIUM CHLORIDE 9 MG/ML
INJECTION, SOLUTION INTRAVENOUS ONCE
Status: CANCELLED | OUTPATIENT
Start: 2024-05-06 | End: 2024-05-06

## 2024-05-06 NOTE — PROGRESS NOTES
Cardiology attending addendum  Patient was seen and examined with Tj Lopez DO. Agree with plan as outlined below.  68-year-old male with nonischemic cardiomyopathy (EF 40%), mitral regurgitation, nonobstructive CAD, hyperlipidemia, PAD who is here for follow-up after recent hospitalization.  Patient was admitted with a scrotal abscess on March 15th and underwent orchiectomy on 03/18/2024.  He had cardiac arrest following the procedure requiring 3 shocks.  Cardiology note states patient had torsade de Pointe however no strips are available for review unfortunately.  QTC from an EKG the day before showed QTC 480ms.  Subsequently patient had a coronary angiogram that showed nonobstructive disease. Echocardiogram obtained on 03/28/2024 which I have personally reviewed reveals ejection fraction 40% (unchanged from prior).  Patient then had another admission in April 2024 for bleeding at the surgical site.  While being admitted he had a few runs of NSVT.  At that time his K was 3.3 and Mag was 1.7.    Even though torsade de Pointe is probably due to QT prolonging agents, potentially anesthetics used during surgery given his low ejection fraction as well as episodes of NSVT, will proceed with defibrillator implantation.  Patient is agreeable.    Will also stop Jardiance as it could be a cause UTIs and Claudine gangrene.    Patient is noted to have a carotid bruit on auscultation and will obtain a carotid ultrasound.    Patient with NYHA class 2 symptoms.  His Lasix was recently increased to 40 mg bid and he reports this dose is working for him.  He is euvolemic on exam.  He was given oxygen to be used at night, unclear indication. He has been using it at night. Will refer for a sleep study.      Dayanna Johnson MD  Cardiology staff-[facility]

## 2024-05-06 NOTE — PROGRESS NOTES
[facility]  Cariology Clinic - Follow Up     Cardiology Attending: Dr. Aung Verduzco MD  Date of Visit: 5/6/2024    Subjective:      Ran Reyes Jr. is a 68 y.o. male with a PMH significant for NICM-HFimpEF (LVEF 50-55%) with functional MR (severe, posteriorly directed), HTN, HLD, longstanding AF, PAD s/p stent to SFA and rt TA, scrotal abscess, cardiac arrest postoperatively occurring on 3/18, and tobacco abuse who presents for follow up.     Patient was recently hospitalized at [facility] secondary to scrotal abscess.  Hospitalization course was complicated by cardiac arrest occurring on 03/18 where patient was defibrillated x3.  Noted that patient was in torsades but there is no rhythm strip displaying this in the patient's chart.  Patient re-presented to [facility] on 04/26 secondary to scrotal bleed.  At this hospitalization, patient was started on IV antibiotics and imaging was obtained of scrotum which was negative for abscess.  Hospitalization course complicated by several episodes of nonsustained V-tach.  Notable hypomagnesemia with repletion during hospitalization.    Today, patient reports that he is feeling well overall and has no acute complaints.  He continues to wear home oxygen while sleeping and on exertion.  He reports that he currently uses 2 L during these times.  He reports that it Lasix dose was increased during hospitalization to 80 mg daily.  He reports that this change in dosing has significantly improved his symptoms such as shortness of breath and lower extremity edema.  Does report frequent nocturia secondary to this increasing Lasix dose.     Medications:     Current Outpatient Medications   Medication Sig Dispense Refill    ALPRAZolam (XANAX) 0.25 MG tablet Take 1 tablet (0.25 mg total) by mouth 3 (three) times daily as needed for Anxiety. 21 tablet 0    aspirin (ECOTRIN) 81 MG EC tablet Take 1 tablet (81 mg total) by mouth  once daily. 30 tablet 11    atorvastatin (LIPITOR) 80 MG tablet Take 1 tablet (80 mg total) by mouth once daily. 90 tablet 3    cetirizine (ZYRTEC) 10 MG tablet Take 1 tablet (10 mg total) by mouth once daily. for 14 days 14 tablet 0    empagliflozin (JARDIANCE) 10 mg tablet Take 1 tablet (10 mg total) by mouth once daily. 90 tablet 3    folic acid (FOLVITE) 1 MG tablet Take 1 tablet (1 mg total) by mouth once daily. 30 tablet 11    furosemide (LASIX) 40 MG tablet Take 1 tablet (40 mg total) by mouth 2 (two) times a day. 180 tablet 3    hydrOXYzine HCL (ATARAX) 10 MG Tab Take 50 mg by mouth 2 (two) times daily as needed.      metoprolol succinate (TOPROL-XL) 25 MG 24 hr tablet Take 0.5 tablets (12.5 mg total) by mouth once daily. 45 tablet 3    midodrine (PROAMATINE) 10 MG tablet Take 1 tablet (10 mg total) by mouth 3 (three) times daily with meals. 90 tablet 11    pantoprazole (PROTONIX) 40 MG tablet Take 1 tablet (40 mg total) by mouth 2 (two) times a day. 60 tablet 0    rivaroxaban (XARELTO) 20 mg Tab Take 20 mg by mouth.      spironolactone (ALDACTONE) 25 MG tablet Take 1 tablet (25 mg total) by mouth once daily. 90 tablet 3    triamcinolone acetonide 0.1% (KENALOG) 0.1 % cream Apply topically Daily.       No current facility-administered medications for this visit.     Facility-Administered Medications Ordered in Other Visits   Medication Dose Route Frequency Provider Last Rate Last Admin    0.9%  NaCl infusion   Intravenous Continuous Shannon Milligan MD 10 mL/hr at 03/09/23 1020 New Bag at 03/09/23 1020    LIDOcaine (PF) 10 mg/ml (1%) injection 10 mg  1 mL Intradermal Once Lanette Ulloa FNP        LIDOcaine (PF) 10 mg/ml (1%) injection 10 mg  1 mL Intradermal Once Shannon Milligan MD        ondansetron injection 4 mg  4 mg Intravenous Once Shannon Milligan MD        prochlorperazine injection Soln 5 mg  5 mg Intravenous Once PRN Shannon Milligan MD           I have reviewed and updated  the patient's medications, allergies, past medical history, surgical history, social history and family history as needed.    Review of Systems:     Review of Systems   Constitutional:  Negative for chills, diaphoresis, fever and malaise/fatigue.   Respiratory:  Positive for shortness of breath. Negative for cough, hemoptysis, sputum production and wheezing.    Cardiovascular:  Negative for chest pain, palpitations, orthopnea and leg swelling.   Gastrointestinal:  Negative for abdominal pain, constipation, diarrhea, nausea and vomiting.   Genitourinary:  Positive for frequency. Negative for dysuria, flank pain, hematuria and urgency.     14 point ROS is negative unless as stated above in HPI  Objective:     Wt Readings from Last 3 Encounters:   04/25/24 90.7 kg (200 lb)   04/23/24 91.9 kg (202 lb 9.6 oz)   04/19/24 92.1 kg (203 lb)     Temp Readings from Last 3 Encounters:   04/30/24 97.9 °F (36.6 °C) (Oral)   04/23/24 99.3 °F (37.4 °C)   04/19/24 98.4 °F (36.9 °C)     BP Readings from Last 3 Encounters:   04/30/24 104/67   04/23/24 123/74   04/19/24 127/79     Pulse Readings from Last 3 Encounters:   04/30/24 73   04/23/24 61   04/19/24 72       There were no vitals filed for this visit.    There is no height or weight on file to calculate BMI.    Physical Exam  Vitals reviewed.   Cardiovascular:      Rate and Rhythm: Normal rate. Rhythm irregular.      Pulses: Normal pulses.      Heart sounds: Murmur heard.      No friction rub. No gallop.      Comments: No JVD  Pulmonary:      Breath sounds: Normal breath sounds. No wheezing, rhonchi or rales.   Abdominal:      General: There is no distension.      Palpations: Abdomen is soft.      Tenderness: There is no abdominal tenderness.   Musculoskeletal:         General: No swelling or tenderness.   Neurological:      General: No focal deficit present.      Mental Status: He is alert. Mental status is at baseline.        Labs:   I have reviewed the following labs below:       Lab Results   Component Value Date    WBC 5.38 04/29/2024    HGB 8.0 (L) 04/29/2024    HCT 26.5 (L) 04/29/2024     04/29/2024    MCV 96.4 (H) 04/29/2024    RDW 17.3 (H) 04/29/2024     Lab Results   Component Value Date     04/29/2024    K 3.9 04/29/2024    CO2 29 04/29/2024    BUN 12.3 04/29/2024    CALCIUM 8.8 04/29/2024    MG 1.90 04/29/2024    PHOS 2.7 04/28/2024     Lab Results   Component Value Date    ALBUMIN 3.0 (L) 04/28/2024    BILITOT 1.6 (H) 04/28/2024    AST 18 04/28/2024    ALKPHOS 112 04/28/2024    ALT 20 04/28/2024     Cholesterol Total   Date Value Ref Range Status   03/18/2024 46 <=200 mg/dL Final     HDL Cholesterol   Date Value Ref Range Status   03/18/2024 <5 (L) 35 - 60 mg/dL Final     LDL Cholesterol   Date Value Ref Range Status   11/16/2021 49.00 (L) 50.00 - 140.00 mg/dL Final     Triglyceride   Date Value Ref Range Status   04/12/2024 46 34 - 140 mg/dL Final     Lab Results   Component Value Date    HGBA1C 5.0 03/18/2024    HGBA1C 4.7 11/15/2021    HGBA1C 5.7 10/07/2020    CREATININE 0.87 04/29/2024     Lab Results   Component Value Date    TSH 1.097 03/18/2024     Lab Results   Component Value Date    IRON 27 (L) 03/18/2024    TIBC 151 (L) 03/18/2024    FERRITIN 1,101.88 (H) 03/18/2024    IDOZYCXQ26 1,078 (H) 03/18/2024    FOLATE 10.0 03/18/2024     Lab Results   Component Value Date    INR 1.6 (H) 03/17/2024    PROTIME 19.1 (H) 03/17/2024      Lab Results   Component Value Date    TROPONINI <0.010 04/19/2024    TROPONINI <0.010 04/09/2024    TROPONINI 0.028 03/29/2024    TROPONINI 0.040 03/20/2024    TROPONINI 0.058 (H) 03/18/2024    .7 (H) 04/26/2024    .5 (H) 04/19/2024    BNP 1,273.3 (H) 03/17/2024     Cardiovascular Studies:     ADRIENNE on 01/2022   Summary  Severe posteriorly directed mitral regurgitation (ERO 0.5cm2, regurgitant volume 84mL). Mitral regurgitation is type 1a (normal leaflet  motion).  Overall left ventricular systolic function is moderately  reduced, LVEF 41%.  Left ventricular cavity is severely enlarged.  Severe left ventricular hypertrophy.  Global hypokinesis noted.  Right ventricular cavity is moderately enlarged. Right ventricular systolic function is moderately reduced.  Atrial septum is intact with no evidence of shunt. Contrast study with agitated saline is mildly positive consistent with intracardiac  shunt at atrial level.  No thrombus or spontaneous contrast noted within the left atrium or left atrial appendage.  Mild-to-moderate tricuspid regurgitation.  No aortic stenosis. No aortic regurgitation present. Aortic valve is tricuspid.  Normal pulmonic valve structure and function.  No evidence of pericardial effusion.    TTE 11/6/21  Mitral Valve  Structurally normal mitral valve.  Moderate to severe central mitral regurgitation.  Aortic Valve  Structurally trileaflet aortic valve.  Tricuspid Valve  Mild tricuspid regurgitation  Pulmonary arterial systolic pressure (PASP) is estimated to be moderately elevated (46-65 mmHg).  Pulmonic Valve  No evidence of any pulmonic regurgitation.  Left Atrium  Moderate to severely dilated left atrium.  Left Ventricle  Left ventricular ejection fraction is measured at approximately 25-30%.  Right Atrium  Moderate to severely dilated right atrium.  Right Ventricle  Right ventricular cavity is mildly enlarged.  Pericardial Effusion  No evidence of a pericardial effusion.  Pleural Effusion  No evidence of pleural effusion.  Miscellaneous  The IVC is dilated .  IVC respiratory change in dimension < 50% .    Assessment/Plan:     S/P cardiac arrest during recent hospitalization with defibrillation x3  - patient was recently admitted for scrotal abscess and had cardiac arrest after orchectomy  - noted to be in torsades in cardiology note but rhythm strip for this time is not available for review  - mild hypokalemia noted at the time with no other significant electrolyte abnormalities  - EKG after event displayed  questionable prolonged QT  - due to patient's persistently low EF (approximally 40%) despite GDMT and events occurring during recent hospitalization, plan for AICD placement.  This was discussed with patient in detail who is agreeable.     Primary hypertension  -/64 today in clinic  -continue Entresto 49-51 BID, Aldactone 25 QD    Chronic Atrial Fibrillation  -Continue Xarelto 20 mg daily. CHADSVASc 4.   -currently rate controlled, no pharmacological therapy at this time     Tobacco user  -again cessation recommend.      NICM/HFrEF  -most recent EF interpreted as 50-55%; however, on reviewing clinic today EF more congruent with 40%  -Continue entresto to 49-51 BID today  -Continue Aldactone 25 mg daily.   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 Polanco and followed in structural clinic, no plans for intervention at this time given.  Continue to follow.     PAD (peripheral arterial disease) s/p PCI of peripheral arteries  -Plavix recently discontinued by Dr. Wagner; continue to follow with the  -Continue home simvastatin 40mg     Dyslipidemia  -Continue statin therapy.      Return to clinic after AICD placement    Tj Lopez DO  [facility] PGY-1

## 2024-05-06 NOTE — PATIENT INSTRUCTIONS
You will have a ICD implant on 6/17/24     You are scheduled for a preop visit prior    Carotid ultrasound to be scheduled    Referral made for sleep study

## 2024-05-13 ENCOUNTER — DOCUMENT SCAN (OUTPATIENT)
Dept: HOME HEALTH SERVICES | Facility: HOSPITAL | Age: 69
End: 2024-05-13
Payer: MEDICARE

## 2024-05-13 ENCOUNTER — OFFICE VISIT (OUTPATIENT)
Dept: INTERNAL MEDICINE | Facility: CLINIC | Age: 69
End: 2024-05-13
Payer: MEDICARE

## 2024-05-13 VITALS
OXYGEN SATURATION: 99 % | WEIGHT: 195 LBS | HEIGHT: 72 IN | RESPIRATION RATE: 24 BRPM | SYSTOLIC BLOOD PRESSURE: 121 MMHG | DIASTOLIC BLOOD PRESSURE: 76 MMHG | TEMPERATURE: 98 F | BODY MASS INDEX: 26.41 KG/M2 | HEART RATE: 62 BPM

## 2024-05-13 DIAGNOSIS — Z72.0 TOBACCO USE: ICD-10-CM

## 2024-05-13 DIAGNOSIS — S30.22XA SCROTAL HEMATOMA: ICD-10-CM

## 2024-05-13 DIAGNOSIS — I50.9 CHRONIC CONGESTIVE HEART FAILURE, UNSPECIFIED HEART FAILURE TYPE: Primary | ICD-10-CM

## 2024-05-13 DIAGNOSIS — I73.9 PVD (PERIPHERAL VASCULAR DISEASE): ICD-10-CM

## 2024-05-13 DIAGNOSIS — I25.10 ARTERIOSCLEROSIS OF CORONARY ARTERY: ICD-10-CM

## 2024-05-13 DIAGNOSIS — I50.20 HFREF (HEART FAILURE WITH REDUCED EJECTION FRACTION): ICD-10-CM

## 2024-05-13 DIAGNOSIS — R19.5 POSITIVE COLORECTAL CANCER SCREENING USING COLOGUARD TEST: ICD-10-CM

## 2024-05-13 DIAGNOSIS — I10 PRIMARY HYPERTENSION: ICD-10-CM

## 2024-05-13 DIAGNOSIS — I38 VHD (VALVULAR HEART DISEASE): ICD-10-CM

## 2024-05-13 DIAGNOSIS — J44.9 CHRONIC OBSTRUCTIVE PULMONARY DISEASE, UNSPECIFIED COPD TYPE: ICD-10-CM

## 2024-05-13 DIAGNOSIS — I34.0 NONRHEUMATIC MITRAL VALVE REGURGITATION: ICD-10-CM

## 2024-05-13 DIAGNOSIS — I48.20 CHRONIC ATRIAL FIBRILLATION: ICD-10-CM

## 2024-05-13 DIAGNOSIS — E78.5 DYSLIPIDEMIA: ICD-10-CM

## 2024-05-13 PROBLEM — D68.69 OTHER THROMBOPHILIA: Status: ACTIVE | Noted: 2024-05-13

## 2024-05-13 PROBLEM — M54.50 LOW BACK PAIN: Status: ACTIVE | Noted: 2024-05-13

## 2024-05-13 PROBLEM — H61.90 LESION OF EXTERNAL EAR: Status: ACTIVE | Noted: 2024-05-13

## 2024-05-13 PROBLEM — E26.1 SECONDARY HYPERALDOSTERONISM: Status: ACTIVE | Noted: 2024-05-13

## 2024-05-13 PROCEDURE — 90677 PCV20 VACCINE IM: CPT | Mod: PBBFAC

## 2024-05-13 PROCEDURE — 99215 OFFICE O/P EST HI 40 MIN: CPT | Mod: PBBFAC

## 2024-05-13 RX ORDER — VARENICLINE TARTRATE 0.5 (11)-1
KIT ORAL
Qty: 1 EACH | Refills: 2 | Status: SHIPPED | OUTPATIENT
Start: 2024-05-13

## 2024-05-13 NOTE — PROGRESS NOTES
INTERNAL MEDICINE RESIDENT CLINIC  CLINIC NOTE    Patient Name: Ran Reyes Jr.  YOB: 1955  Chief Complaint: Post bateman follow up. (Post bateman follow up. )     PRESENTING HISTORY   History of Present Illness:  Mr. Ran Reyes Jr. is a 68 y.o. male w/ PMH NICM-HFimpEF (LVEF 50-55%) with functional MR (severe, posteriorly directed), HTN, HLD, longstanding AF, PAD s/p stent to SFA and rt TA, scrotal abscess, cardiac arrest postoperatively (orchectomy) occurring on 3/18 requiring 3 rounds of defibrillation, Infrarenal abd aortic aneurysm with mural thrombus, and tobacco abuse who presents for follow up and post-hospitalization appointment. Today, denies any acute complaints.     Scrotal abscess has now resolved to a healing ulcer; patient cleaning 2-3 times a day. Has follow up with Urology in 2 days. Denies any fever, chill, purulence, worsening of the wound.     Cardiac Disease: scheduled for AICD placement next month. Denies CP, SOB, diminished functional capacity. He feels that he walk a couple of city blocks without issue.     Upper respiratory symptoms: started over the past couple of days; assoc with pleuritic cp. Denies any fever, chills. Endorses dry cough.     Pos Cologaurd: denies any hematochezia or abdominal pain. Denies following up with GI.      Endorses issues with tobacco cessation, would like to quit.    Review of Systems:  12 point review of symptoms negative unless otherwise stated above    PAST HISTORY:     Past Medical History:   Diagnosis Date    A-fib     Anticoagulant long-term use     Aortic aneurysm     Cataract     CHF (congestive heart failure)     Chronic atrial fibrillation     COPD (chronic obstructive pulmonary disease)     Coronary artery disease     HLD (hyperlipidemia)     Hypertension     Mitral regurgitation     PAD (peripheral artery disease)     Primary open angle glaucoma (POAG)         Past Surgical History:   Procedure Laterality Date    ANGIOGRAM,  CORONARY, WITH LEFT HEART CATHETERIZATION N/A 3/26/2024    Procedure: Angiogram, Coronary, with Left Heart Cath;  Surgeon: Adriano Montiel MD;  Location: [facility] CATH LAB;  Service: Cardiology;  Laterality: N/A;    ATHERECTOMY OF PERIPHERAL VESSEL Left 09/12/2022    LEFT SFA ATHERECTOMY, BALLOON ANGIOPLASTY    CATARACT EXTRACTION W/  INTRAOCULAR LENS IMPLANT Left 3/9/2023    Procedure: EXTRACTION, CATARACT, WITH IOL INSERTION;  Surgeon: Georgi Borja MD;  Location: [facility];  Service: Ophthalmology;  Laterality: Left;  19.5  mac    EGD, WITH CLOSED BIOPSY  3/22/2024    Procedure: EGD, WITH CLOSED BIOPSY;  Surgeon: Ronald Calderon MD;  Location: [facility] ENDOSCOPY;  Service: Gastroenterology;;    ESOPHAGOGASTRODUODENOSCOPY N/A 3/22/2024    Procedure: EGD;  Surgeon: Ronald Calderon MD;  Location: [facility] ENDOSCOPY;  Service: Gastroenterology;  Laterality: N/A;    Heart Stent N/A     > 10yrs. AGO    INCISION AND DRAINAGE N/A 3/18/2024    Procedure: Incision and Drainage;  Surgeon: Fahad Rivsa MD;  Location: [facility];  Service: Urology;  Laterality: N/A;  I&D SCROTAL ABSCESS    INSERTION OF STENT INTO PERIPHERAL VESSEL Right 10/17/2022    RIGHT SFA ATHERECTOMY, BALLOON ANGIOPLASTY, STENT; RIGHT ANTERIOR TIBIAL ARTERY ATHERECTOMY, BALLOON ANGIOPLASTY    ORCHIECTOMY Left 3/18/2024    Procedure: ORCHIECTOMY;  Surgeon: Fahad Rivas MD;  Location: [facility];  Service: Urology;  Laterality: Left;       Family History   Problem Relation Name Age of Onset    Cancer Mother      Hypertension Mother      Heart failure Father      Stroke Father      Hypertension Father      No Known Problems Sister         Social History     Socioeconomic History    Marital status: Single   Tobacco Use    Smoking status: Every Day     Current packs/day: 0.25     Average packs/day: 0.3 packs/day for 40.0 years (10.0 ttl pk-yrs)     Types: Cigarettes     Passive exposure: Current    Smokeless tobacco: Never    Substance and Sexual Activity    Alcohol use: Yes     Alcohol/week: 3.0 standard drinks of alcohol     Types: 3 Cans of beer per week     Comment: socially    Drug use: Yes     Frequency: 1.0 times per week     Types: Marijuana     Comment: no use in over 2 months     Social Determinants of Health     Financial Resource Strain: Patient Unable To Answer (3/19/2024)    Overall Financial Resource Strain (CARDIA)     Difficulty of Paying Living Expenses: Patient unable to answer   Food Insecurity: Patient Unable To Answer (3/19/2024)    Hunger Vital Sign     Worried About Running Out of Food in the Last Year: Patient unable to answer     Ran Out of Food in the Last Year: Patient unable to answer   Transportation Needs: Patient Unable To Answer (3/19/2024)    PRAPARE - Transportation     Lack of Transportation (Medical): Patient unable to answer     Lack of Transportation (Non-Medical): Patient unable to answer   Physical Activity: Inactive (11/30/2022)    Exercise Vital Sign     Days of Exercise per Week: 0 days     Minutes of Exercise per Session: 0 min   Stress: Patient Unable To Answer (3/19/2024)    [employer] - Occupational Stress Questionnaire     Feeling of Stress : Patient unable to answer   Housing Stability: Patient Unable To Answer (3/19/2024)    Housing Stability Vital Sign     Unable to Pay for Housing in the Last Year: Patient unable to answer     Unstable Housing in the Last Year: Patient unable to answer       MEDICATIONS:     Current Outpatient Medications   Medication Instructions    ALPRAZolam (XANAX) 0.25 mg, Oral, 3 times daily PRN    aspirin (ECOTRIN) 81 mg, Oral, Daily    atorvastatin (LIPITOR) 80 mg, Oral, Daily    cetirizine (ZYRTEC) 10 mg, Oral, Daily    folic acid (FOLVITE) 1 mg, Oral, Daily    furosemide (LASIX) 40 mg, Oral, 2 times daily    hydrOXYzine HCL (ATARAX) 50 mg, Oral, 2 times daily PRN    metoprolol succinate (TOPROL-XL) 12.5 mg, Oral, Daily     midodrine (PROAMATINE) 10 mg, Oral, 3 times daily with meals    pantoprazole (PROTONIX) 40 mg, Oral, 2 times daily    rivaroxaban (XARELTO) 20 mg, Oral, Daily    spironolactone (ALDACTONE) 25 mg, Oral, Daily    triamcinolone acetonide 0.1% (KENALOG) 0.1 % cream Topical (Top), Daily    varenicline (CHANTIX STARTING MONTH BOX) 0.5 mg (11)- 1 mg (42) tablet Take one 0.5mg tab by mouth once daily X3 days,then increase to one 0.5mg tab twice daily X4 days,then increase to one 1mg tab twice daily        OBJECTIVE:   Vital Signs:  Vitals:    05/13/24 1243   BP: 121/76   Pulse: 62   Resp: (!) 24   Temp: 97.8 °F (36.6 °C)   SpO2: 99%   Weight: 88.5 kg (195 lb)   Height: 6' (1.829 m)        Physical Exam:  GEN: A&Ox4. No acute distress   HEENT: normocephalic, atraumatic. PERRLA. EMOI bilaterally. Sclera, conjunctiva clear. Nares patents. Oropharyngeal mucosa moist, non-erythematous, non-edematous, uvula midline. Neck supple without LAD   CARDIAC: S1 S2, no MRG. Radial pulses full, no LE edema   RESPI: Chest wall rise symmetric, atraumatic. Lungs CTAB, no wheezing or rhonchi   ABDOMEN: soft, nontender, BS present in all 4 quadrants. No herniation, masses, HSM  : deferred   MSK: ROM grossly intact.   NEURO: Motor and sensation grossly intact. CN grossly intact. No cerebellar deficits noted. No gait abnormalities noted.   PSYCH: Memory and thought process appear intact. Appropriate mood and affect. No AI/HI. No HI/SI .       Laboratory  Lab Results   Component Value Date     04/29/2024    K 3.9 04/29/2024    CO2 29 04/29/2024    BUN 12.3 04/29/2024    CREATININE 0.87 04/29/2024    CALCIUM 8.8 04/29/2024    BILIDIR 0.6 (H) 12/06/2021    IBILI 0.50 12/06/2021    ALKPHOS 112 04/28/2024    AST 18 04/28/2024    ALT 20 04/28/2024    MG 1.90 04/29/2024    PHOS 2.7 04/28/2024        Lab Results   Component Value Date    WBC 5.38 04/29/2024    RBC 2.75 (L) 04/29/2024    HGB 8.0 (L) 04/29/2024    HCT 26.5 (L) 04/29/2024    MCV  96.4 (H) 04/29/2024    MCH 29.1 04/29/2024    MCHC 30.2 (L) 04/29/2024    RDW 17.3 (H) 04/29/2024     04/29/2024    MPV 10.0 04/29/2024        Diagnostic Results:  US Scrotum And Testicles    Result Date: 4/30/2024  EXAMINATION:  Ultrasound scrotum and testicles    CLINICAL HISTORY:  Suspected abscess, history of left orchiectomy    COMPARISON:  04/26/2024    FINDINGS:  The right testicle measures 3.1 x 1.8 x 2.5 cm.  The right testicle demonstrates normal flow.  There is trace right hydrocele.  No intratesticular lesion.  The right testicle is mildly heterogeneous.  No disc significant increased vascularity.  Right epididymis appears unremarkable.  Left testicle surgically absent.  No fluid collection or abscess seen        CT Pelvis With IV Contrast NO Oral Contrast    Result Date: 4/29/2024  EXAMINATION:  CT PELVIS WITH IV CONTRAST    CLINICAL HISTORY:  Other specified disorders of the male genital organsScrotal swelling or edema;    TECHNIQUE:  Helical acquisition through the pelvis with IV contrast.  Three plane reconstructions were provided for review.  mGycm. Automatic exposure control, adjustment of mA/kV or iterative reconstruction technique was used to reduce radiation.    COMPARISON:  26 April 2024    FINDINGS:  Within the imaged pelvis there is no free fluid or mass.  There is increased bladder wall thickening.  Prostate is heterogeneous but not significantly enlarged.  Moderate stool in the imaged colon.  Stable abdominal aortic aneurysm.  Dense atherosclerotic disease.  No acute osseous findings.    There is continued scrotal wall thickening.  A drainable abscess is not convincingly seen.  There are bilateral fat containing inguinal hernias.  The right inguinal hernia extends into the scrotum.  No subcutaneous emphysema is seen.        X-Ray Chest 1 View    Result Date: 4/26/2024  EXAMINATION:  XR CHEST 1 VIEW    CLINICAL HISTORY:  Shortness of breath    TECHNIQUE:  Single frontal view of  the chest was performed.    COMPARISON:  Frontal chest radiograph, 04/19/2024.    FINDINGS:  Bilateral lung opacities, slightly increased in the left mid and upper lung zone compared to prior study.  Trace pleural effusion on the left.  Right costophrenic angle is obscured.  No pneumothorax.    Enlarged cardiomediastinal silhouette, unchanged.  Aortic calcifications.    No acute osseous abnormality.        US Scrotum And Testicles    Result Date: 4/26/2024  EXAMINATION:  US SCROTUM AND TESTICLES    CLINICAL HISTORY:  Pain, unspecified    TECHNIQUE:  Sonography of the scrotum and testes.  Grayscale, color Doppler and spectral Doppler evaluation.    COMPARISON:  03/17/2024    FINDINGS:  RIGHT TESTICLE:    Size: 3 x 2.4 x 1.9 cm    Appearance: Heterogeneous echotexture.  No discrete mass.    Flow: Normal arterial and venous flow    Epididymis: Normal.    Hydrocele: No    Varicocele: Yes    LEFT TESTICLE:    Absent    OTHER: Scrotal edema.        CT Abdomen Pelvis With IV Contrast NO Oral Contrast    Result Date: 4/26/2024  EXAMINATION:  CT ABDOMEN PELVIS WITH IV CONTRAST    CLINICAL HISTORY:  Abdominal pain, acute, nonlocalized;    TECHNIQUE:  Low dose axial images, sagittal and coronal reformations were obtained from the lung bases to the pubic symphysis following the IV administration of 100 mL of Omnipaque 350 .  Oral contrast not given.  DLP 1368.  Automated exposure control used.    COMPARISON:  Ultrasound 04/26/2024    FINDINGS:  Liver demonstrates no focal lesion.    Gallbladder and biliary ductal system are normal.    Pancreas, stomach, spleen, adrenal glands are normal.    Kidneys demonstrate it normal enhancement.  There is asymmetric renal cortical scarring on the left.  No hydronephrosis.    There is a saccular aneurysm of the mid abdominal aorta measuring up to 3.8 cm diameter, stable.  Moderate severe atherosclerotic calcification.    Bowel loops normal with no thickening or dilation.    Mesentery normal  with no inflammatory change or ascites.  No lymphadenopathy in the abdomen or pelvis.    Mild bladder wall thickening.  Slight haziness along the bladder wall margin more significant posteriorly.  Rectum normal.    Severe generalized scrotal wall thickening.  Right orchectomy.  No definite organized fluid collection.  There appears to be a hydrocele.    Bones intact.    Subsegmental area of infiltrate posterior right lung base.  Mild interstitial thickening, hazy ground-glass density at the lung bases.        X-Ray Chest 1 View    Result Date: 4/19/2024  EXAMINATION:  XR CHEST 1 VIEW    CLINICAL HISTORY:  dyspnea;    TECHNIQUE:  Single view of the chest    COMPARISON:  04/11/2024    FINDINGS:  Slightly increased interstitial markings of the right lower lobe.    The cardiomediastinal silhouette is within normal limits.    No acute osseous abnormality.         No results found in the last 24 hours.   Results for orders placed or performed during the hospital encounter of 04/26/24   EKG 12-lead    Collection Time: 04/26/24  8:40 AM   Result Value Ref Range    QRS Duration 88 ms    [facility] QTC Calculation 442 ms    Narrative    Test Reason : E87.6,    Vent. Rate : 086 BPM     Atrial Rate : 000 BPM     P-R Int : 000 ms          QRS Dur : 088 ms      QT Int : 370 ms       P-R-T Axes : 000 083 095 degrees     QTc Int : 442 ms    Atrial fibrillation  ST and T wave abnormality, consider lateral ischemia  Abnormal ECG  When compared with ECG of 19-APR-2024 08:37,  Questionable change in The axis  T wave inversion more evident in Lateral leads  Confirmed by Harris Wagner MD ([id number]) on 4/26/2024 2:29:31 PM    Referred By: [email or web address]   SELF           Confirmed By:Harris Wagner MD     Echo    Result Date: 3/28/2024    Left Ventricle: The left ventricle is normal in size. Moderately   increased wall thickness. There is low normal systolic function with a   visually estimated ejection fraction of 50 - 55%. There is diastolic    dysfunction.    Mitral Valve: There is moderate regurgitation.        Echo Saline Bubble? No    Result Date: 3/27/2024    Left Atrium: Agitated saline study of the atrial septum is faintly   positive.  Small amount of late bubbles crossing only with valsalva   images.  Could be via pulmonary transit.  No clear atrial level shunt.        Echo Saline Bubble? Yes    Result Date: 3/19/2024    Left Ventricle: The left ventricle is normal in size. Increased wall   thickness. Unable to assess wall motion. There is moderately reduced   systolic function with a visually estimated ejection fraction of 30 - 40%.    Right Ventricle: Mild right ventricular enlargement.    Left Atrium: Left atrium is moderately dilated.    Right Atrium: Right atrium is severely dilated.    Mitral Valve: There is moderate regurgitation.    Tricuspid Valve: There is mild to moderate regurgitation.    Pulmonary Artery: Pulmonary artery pressure could not be estimated.    IVC/SVC: Patient is ventilated, cannot use IVC diameter to estimate   right atrial pressure.        Echo    Result Date: 1/31/2024    Left Ventricle: The left ventricle is normal in size. Mildly increased   wall thickness. There is reduced systolic function. Biplane (2D) method of   discs ejection fraction is 41%. Unable to assess diastolic function due to   atrial fibrillation.    Right Ventricle: Normal right ventricular cavity size. Systolic   function is mildly reduced.    Left Atrium: Left atrium is severely dilated.    Right Atrium: Right atrium is severely dilated.    Aortic Valve: The aortic valve is a trileaflet valve.    Mitral Valve: There is bileaflet sclerosis. There is moderate to severe   regurgitation.    Tricuspid Valve: There is mild regurgitation.    IVC/SVC: Normal venous pressure at 3 mmHg.    Consider evaluation for MV repair            ASSESSMENT & PLAN:     Primary hypertension  -/76 today in clinic  -continue Entresto 49-51 BID, Aldactone 25 QD      Chronic Atrial Fibrillation  -Continue Xarelto 20 mg daily  -CHADSVASc 4.   -currently rate controlled, no pharmacological therapy at this time     NICM/HFrEF  S/P cardiac arrest during recent hospitalization with defibrillation x3  -most recent EF interpreted as 50-55%; however, on reviewing clinic today EF more congruent with 40%  -Continue entresto to 49-51 BID and Aldactone 25 mg daily.   -continue Lasix 80 mg daily, patient reports symptomatic improvement since increased dose  -Cards d/c Jardiance out of precaution for Claudine's Gangrene 2/2 h/o scrotal abscess   -Upcoming AICD placement 2/2 persistently low EF despite GDMT and events during recent hospitalization      Functional MR    -noted to be moderate almost recent echo performed on 03/28; previously noted to be severe  -seen by Dr. Polanco and followed in structural clinic, no plans for intervention at this time given.   -Continue to follow.    PAD s/p PCI of peripheral arteries  HLD   -Plavix recently discontinued by Dr. Wagner   -Continue home simvastatin 40mg and ASA 81 mg     Tobacco Use  - smoking cessation program  - chantix with 1 refill     Infrarenal Aortic Aneurysm with Mural Thrombus   - Noted on CTA Abdomen from 3/17/24, measured to be 3.6 cm   - Will r/p imaging in 2025   - HTN control and smoking cessation     Positive Cologaurd   - Denies any GIB, abdominal pain, changes in stool  - Will [facility] for [facility]    Health Maintenance/ Wellness    Immunization History   Administered Date(s) Administered    Influenza - High Dose - PF (65 years and older) 11/30/2022    Pneumococcal Conjugate - 20 Valent 05/13/2024    Tdap 09/17/2019    Zoster Recombinant 09/13/2021, 12/10/2022       Vaccinations   -Flu: complete    -Pneumonia: complete    -Shingles: complete    -Tdap: due 2029   -COVID: complete   Screening   -Colon Ca. Screening:  referral sent today    -Hep C, HIV:  complete   Social   -EtOH: Denies Use   -Tobacco: Endorses Use    -Drugs: Denied Use           Counseling:  - Patient instructed to limit alcohol use  - Patient counselled on smoking cessation  - Educated on diet (portion control) and exercise (at least 30 minutes per day)  - Relevant educational materials provided    Medications: reconciled, discussed and refills given.  Follow up in about 2 months (around 7/13/2024) for Follow Up.      Abiodun Recinos DO   Internal Medicine - PGY-1

## 2024-05-14 NOTE — PROGRESS NOTES
I have reviewed and concur with the resident's history, physical, assessment, and plan.  I have discussed with him all issues related to the diagnosis, workup and treatment plan. Care provided as reasonable and necessary.SD/P cardiac arrest on ACD. Jardiance dc. Hx of scrotal abscess    Terry Boyd MD  [facility]

## 2024-05-15 ENCOUNTER — OFFICE VISIT (OUTPATIENT)
Dept: UROLOGY | Facility: CLINIC | Age: 69
End: 2024-05-15
Payer: MEDICARE

## 2024-05-15 VITALS
HEART RATE: 66 BPM | TEMPERATURE: 98 F | OXYGEN SATURATION: 100 % | WEIGHT: 194 LBS | HEIGHT: 72 IN | RESPIRATION RATE: 19 BRPM | DIASTOLIC BLOOD PRESSURE: 72 MMHG | SYSTOLIC BLOOD PRESSURE: 118 MMHG | BODY MASS INDEX: 26.28 KG/M2

## 2024-05-15 DIAGNOSIS — N49.2 SCROTAL ABSCESS: Primary | ICD-10-CM

## 2024-05-15 LAB
BILIRUB SERPL-MCNC: NORMAL MG/DL
BLOOD URINE, POC: NORMAL
COLOR, POC UA: YELLOW
GLUCOSE UR QL STRIP: NORMAL
KETONES UR QL STRIP: NORMAL
LEUKOCYTE ESTERASE URINE, POC: NORMAL
NITRITE, POC UA: NORMAL
PH, POC UA: 5.5
PROTEIN, POC: NORMAL
SPECIFIC GRAVITY, POC UA: 1.01
UROBILINOGEN, POC UA: 0.2

## 2024-05-15 PROCEDURE — 81001 URINALYSIS AUTO W/SCOPE: CPT | Mod: PBBFAC | Performed by: UROLOGY

## 2024-05-15 PROCEDURE — 4010F ACE/ARB THERAPY RXD/TAKEN: CPT | Mod: CPTII,,, | Performed by: UROLOGY

## 2024-05-15 PROCEDURE — 1159F MED LIST DOCD IN RCRD: CPT | Mod: CPTII,,, | Performed by: UROLOGY

## 2024-05-15 PROCEDURE — 99213 OFFICE O/P EST LOW 20 MIN: CPT | Mod: S$PBB,,, | Performed by: UROLOGY

## 2024-05-15 PROCEDURE — 1126F AMNT PAIN NOTED NONE PRSNT: CPT | Mod: CPTII,,, | Performed by: UROLOGY

## 2024-05-15 PROCEDURE — 3044F HG A1C LEVEL LT 7.0%: CPT | Mod: CPTII,,, | Performed by: UROLOGY

## 2024-05-15 PROCEDURE — 99215 OFFICE O/P EST HI 40 MIN: CPT | Mod: PBBFAC | Performed by: UROLOGY

## 2024-05-15 PROCEDURE — 1101F PT FALLS ASSESS-DOCD LE1/YR: CPT | Mod: CPTII,,, | Performed by: UROLOGY

## 2024-05-15 PROCEDURE — 3008F BODY MASS INDEX DOCD: CPT | Mod: CPTII,,, | Performed by: UROLOGY

## 2024-05-15 PROCEDURE — 1160F RVW MEDS BY RX/DR IN RCRD: CPT | Mod: CPTII,,, | Performed by: UROLOGY

## 2024-05-15 PROCEDURE — 3288F FALL RISK ASSESSMENT DOCD: CPT | Mod: CPTII,,, | Performed by: UROLOGY

## 2024-05-15 PROCEDURE — 3078F DIAST BP <80 MM HG: CPT | Mod: CPTII,,, | Performed by: UROLOGY

## 2024-05-15 PROCEDURE — 3074F SYST BP LT 130 MM HG: CPT | Mod: CPTII,,, | Performed by: UROLOGY

## 2024-05-16 NOTE — PROGRESS NOTES
CC:  Follow-up    HPI:  Ran Reyes Jr. is a 68 y.o. male seen for follow-up.  He was here today for follow-up of a hydrocele however he was hospitalized in March with a scrotal abscess/Claudine's.  The abscess seemed to have started from a sore on the posterior scrotum which was draining presented to the emergency room.  He was septic and very ill.  He did have drainage of the scrotal abscess as well as a left orchiectomy.  He is doing better from that does still have a small part of the incision that is open.  He is seeing wound care.  He has no urinary complaints today.  I have no PSAs on file for him.  [facility] and [facility] that he has been [facility] them with [facility].    Urinalysis:  Results for orders placed or performed in visit on 05/15/24   POCT URINE DIPSTICK WITH MICROSCOPE, AUTOMATED   Result Value Ref Range    Color, UA Yellow     Spec Grav UA 1.015     pH, UA 5.5     WBC, UA small     Nitrite, UA neg     Protein, POC neg     Glucose, UA neg     Ketones, UA neg     Urobilinogen, UA 0.2     Bilirubin, POC neg     Blood, UA trace-intact      Microscopic Urinalysis:  WBC:   1-2 per HPF     RBC:    0-1 per HPF     Bacteria:    None per HPF     Squamous epithelial cells:    None per HPF      Crystals:   None    Lab Results:    Recent Labs     04/29/24  1347   CREATININE 0.87        Data Review:  I reviewed the operative note and discharge summary from his hospitalization.    ROS:  All systems reviewed and are negative except as documented in HPI and/or Assessment and Plan.     Patient Active Problem List:     Patient Active Problem List   Diagnosis    Primary open angle glaucoma (POAG) of right eye, moderate stage    Combined forms of age-related cataract of left eye    Arteriosclerosis of coronary artery    Chronic atrial fibrillation    Congestive heart failure    Dyslipidemia    Hypertension    Tobacco use    PVD (peripheral vascular disease)    VHD (valvular heart disease)     COVID-19    HFrEF (heart failure with reduced ejection fraction)    Nonrheumatic mitral valve regurgitation    Postoperative eye state    Scrotal hematoma    Chronic obstructive pulmonary disease, unspecified    Lesion of external ear    Low back pain    Other thrombophilia    Secondary hyperaldosteronism    Positive colorectal cancer screening using Cologuard test        Past Medical History:  Past Medical History:   Diagnosis Date    A-fib     Anticoagulant long-term use     Aortic aneurysm     Cataract     CHF (congestive heart failure)     Chronic atrial fibrillation     COPD (chronic obstructive pulmonary disease)     Coronary artery disease     HLD (hyperlipidemia)     Hypertension     Mitral regurgitation     PAD (peripheral artery disease)     Primary open angle glaucoma (POAG)         Past Surgical History:  Past Surgical History:   Procedure Laterality Date    ANGIOGRAM, CORONARY, WITH LEFT HEART CATHETERIZATION N/A 3/26/2024    Procedure: Angiogram, Coronary, with Left Heart Cath;  Surgeon: Adriano Montiel MD;  Location: [facility] CATH LAB;  Service: Cardiology;  Laterality: N/A;    ATHERECTOMY OF PERIPHERAL VESSEL Left 09/12/2022    LEFT SFA ATHERECTOMY, BALLOON ANGIOPLASTY    CATARACT EXTRACTION W/  INTRAOCULAR LENS IMPLANT Left 3/9/2023    Procedure: EXTRACTION, CATARACT, WITH IOL INSERTION;  Surgeon: Georgi Borja MD;  Location: [facility];  Service: Ophthalmology;  Laterality: Left;  19.5  mac    EGD, WITH CLOSED BIOPSY  3/22/2024    Procedure: EGD, WITH CLOSED BIOPSY;  Surgeon: Ronald Calderon MD;  Location: [facility] ENDOSCOPY;  Service: Gastroenterology;;    ESOPHAGOGASTRODUODENOSCOPY N/A 3/22/2024    Procedure: EGD;  Surgeon: Ronald Calderon MD;  Location: [facility] ENDOSCOPY;  Service: Gastroenterology;  Laterality: N/A;    Heart Stent N/A     > 10yrs. AGO    INCISION AND DRAINAGE N/A 3/18/2024    Procedure: Incision and Drainage;  Surgeon: Fahad Rivas MD;  Location: [facility]  OR;  Service: Urology;  Laterality: N/A;  I&D SCROTAL ABSCESS    INSERTION OF STENT INTO PERIPHERAL VESSEL Right 10/17/2022    RIGHT SFA ATHERECTOMY, BALLOON ANGIOPLASTY, STENT; RIGHT ANTERIOR TIBIAL ARTERY ATHERECTOMY, BALLOON ANGIOPLASTY    ORCHIECTOMY Left 3/18/2024    Procedure: ORCHIECTOMY;  Surgeon: Fahad Rivas MD;  Location: [facility] OR;  Service: Urology;  Laterality: Left;        Family History:  Family History   Problem Relation Name Age of Onset    Cancer Mother      Hypertension Mother      Heart failure Father      Stroke Father      Hypertension Father      No Known Problems Sister          Social History:  Social History     Socioeconomic History    Marital status: Single   Tobacco Use    Smoking status: Every Day     Current packs/day: 0.25     Average packs/day: 0.3 packs/day for 40.0 years (10.0 ttl pk-yrs)     Types: Cigarettes     Passive exposure: Current    Smokeless tobacco: Never   Substance and Sexual Activity    Alcohol use: Yes     Alcohol/week: 3.0 standard drinks of alcohol     Types: 3 Cans of beer per week     Comment: socially    Drug use: Yes     Frequency: 1.0 times per week     Types: Marijuana     Comment: no use in over 2 months     Social Determinants of Health     Financial Resource Strain: Patient Unable To Answer (3/19/2024)    Overall Financial Resource Strain (CARDIA)     Difficulty of Paying Living Expenses: Patient unable to answer   Food Insecurity: Patient Unable To Answer (3/19/2024)    Hunger Vital Sign     Worried About Running Out of Food in the Last Year: Patient unable to answer     Ran Out of Food in the Last Year: Patient unable to answer   Transportation Needs: Patient Unable To Answer (3/19/2024)    PRAPARE - Transportation     Lack of Transportation (Medical): Patient unable to answer     Lack of Transportation (Non-Medical): Patient unable to answer   Physical Activity: Inactive (11/30/2022)    Exercise Vital Sign     Days of Exercise per Week: 0 days      Minutes of Exercise per Session: 0 min   Stress: Patient Unable To Answer (3/19/2024)    [employer] - Occupational Stress Questionnaire     Feeling of Stress : Patient unable to answer   Housing Stability: Patient Unable To Answer (3/19/2024)    Housing Stability Vital Sign     Unable to Pay for Housing in the Last Year: Patient unable to answer     Unstable Housing in the Last Year: Patient unable to answer        Allergies:  Review of patient's allergies indicates:  No Known Allergies     Objective:  Vitals:    05/15/24 1357   BP: 118/72   Pulse: 66   Resp: 19   Temp: 97.7 °F (36.5 °C)     General:  Well developed, well nourished adult male in no acute distress  Abdomen: Soft, nontender, no masses  Extremities:  No clubbing, cyanosis, or edema  Neurologic:  Grossly intact  Musculoskeletal:  Normal tone     Assessment:  1. Scrotal abscess  - POCT URINE DIPSTICK WITH MICROSCOPE, AUTOMATED     Plan:  He is status post incision and drainage of a scrotal abscess as well as a left orchiectomy.  [facility] and will just plan to see him back in one year.    Follow-up:  PSA in one year and follow-up after.

## 2024-05-17 NOTE — PHYSICIAN QUERY
Please further specify the heart failure diagnosis.     Acute on Chronic Diastolic Heart Failure (HFpEF)

## 2024-05-24 ENCOUNTER — DOCUMENT SCAN (OUTPATIENT)
Dept: HOME HEALTH SERVICES | Facility: HOSPITAL | Age: 69
End: 2024-05-24
Payer: MEDICARE

## 2024-06-03 ENCOUNTER — HOSPITAL ENCOUNTER (EMERGENCY)
Facility: HOSPITAL | Age: 69
Discharge: HOME | End: 2024-06-03
Attending: STUDENT IN AN ORGANIZED HEALTH CARE EDUCATION/TRAINING PROGRAM
Payer: MEDICARE

## 2024-06-03 VITALS
TEMPERATURE: 98 F | DIASTOLIC BLOOD PRESSURE: 68 MMHG | WEIGHT: 198 LBS | SYSTOLIC BLOOD PRESSURE: 107 MMHG | OXYGEN SATURATION: 98 % | HEART RATE: 71 BPM | RESPIRATION RATE: 19 BRPM | BODY MASS INDEX: 26.82 KG/M2 | HEIGHT: 72 IN

## 2024-06-03 DIAGNOSIS — N39.0 URINARY TRACT INFECTION WITHOUT HEMATURIA, SITE UNSPECIFIED: Primary | ICD-10-CM

## 2024-06-03 DIAGNOSIS — N50.89 SCROTAL SWELLING: ICD-10-CM

## 2024-06-03 LAB
ALBUMIN SERPL-MCNC: 3.7 G/DL (ref 3.4–4.8)
ALBUMIN/GLOB SERPL: 0.9 RATIO (ref 1.1–2)
ALP SERPL-CCNC: 117 UNIT/L (ref 40–150)
ALT SERPL-CCNC: 9 UNIT/L (ref 0–55)
ANION GAP SERPL CALC-SCNC: 10 MEQ/L
AST SERPL-CCNC: 21 UNIT/L (ref 5–34)
BACTERIA #/AREA URNS AUTO: ABNORMAL /HPF
BASOPHILS # BLD AUTO: 0.03 X10(3)/MCL
BASOPHILS NFR BLD AUTO: 0.6 %
BILIRUB SERPL-MCNC: 1.1 MG/DL
BILIRUB UR QL STRIP.AUTO: NEGATIVE
BUN SERPL-MCNC: 18.4 MG/DL (ref 8.4–25.7)
CALCIUM SERPL-MCNC: 9.4 MG/DL (ref 8.8–10)
CHLORIDE SERPL-SCNC: 107 MMOL/L (ref 98–107)
CLARITY UR: ABNORMAL
CO2 SERPL-SCNC: 23 MMOL/L (ref 23–31)
COLOR UR AUTO: ABNORMAL
CREAT SERPL-MCNC: 0.86 MG/DL (ref 0.73–1.18)
CREAT/UREA NIT SERPL: 21
EOSINOPHIL # BLD AUTO: 0.09 X10(3)/MCL (ref 0–0.9)
EOSINOPHIL NFR BLD AUTO: 1.7 %
ERYTHROCYTE [DISTWIDTH] IN BLOOD BY AUTOMATED COUNT: 15.8 % (ref 11.5–17)
GFR SERPLBLD CREATININE-BSD FMLA CKD-EPI: >60 ML/MIN/1.73/M2
GLOBULIN SER-MCNC: 4 GM/DL (ref 2.4–3.5)
GLUCOSE SERPL-MCNC: 79 MG/DL (ref 82–115)
GLUCOSE UR QL STRIP: NORMAL
HCT VFR BLD AUTO: 35.6 % (ref 42–52)
HGB BLD-MCNC: 11 G/DL (ref 14–18)
HGB UR QL STRIP: ABNORMAL
HYALINE CASTS #/AREA URNS LPF: ABNORMAL /LPF
IMM GRANULOCYTES # BLD AUTO: 0.01 X10(3)/MCL (ref 0–0.04)
IMM GRANULOCYTES NFR BLD AUTO: 0.2 %
KETONES UR QL STRIP: NEGATIVE
LEUKOCYTE ESTERASE UR QL STRIP: 500
LYMPHOCYTES # BLD AUTO: 1.4 X10(3)/MCL (ref 0.6–4.6)
LYMPHOCYTES NFR BLD AUTO: 26.2 %
MCH RBC QN AUTO: 28.6 PG (ref 27–31)
MCHC RBC AUTO-ENTMCNC: 30.9 G/DL (ref 33–36)
MCV RBC AUTO: 92.7 FL (ref 80–94)
MONOCYTES # BLD AUTO: 0.57 X10(3)/MCL (ref 0.1–1.3)
MONOCYTES NFR BLD AUTO: 10.7 %
MUCOUS THREADS URNS QL MICRO: ABNORMAL /LPF
NEUTROPHILS # BLD AUTO: 3.25 X10(3)/MCL (ref 2.1–9.2)
NEUTROPHILS NFR BLD AUTO: 60.6 %
NITRITE UR QL STRIP: NEGATIVE
NRBC BLD AUTO-RTO: 0 %
PH UR STRIP: 5.5 [PH]
PLATELET # BLD AUTO: 177 X10(3)/MCL (ref 130–400)
PMV BLD AUTO: 10.2 FL (ref 7.4–10.4)
POTASSIUM SERPL-SCNC: 3.4 MMOL/L (ref 3.5–5.1)
PROT SERPL-MCNC: 7.7 GM/DL (ref 5.8–7.6)
PROT UR QL STRIP: NEGATIVE
RBC # BLD AUTO: 3.84 X10(6)/MCL (ref 4.7–6.1)
RBC #/AREA URNS AUTO: ABNORMAL /HPF
SODIUM SERPL-SCNC: 140 MMOL/L (ref 136–145)
SP GR UR STRIP.AUTO: 1.01 (ref 1–1.03)
SQUAMOUS #/AREA URNS LPF: ABNORMAL /HPF
UROBILINOGEN UR STRIP-ACNC: NORMAL
WBC # SPEC AUTO: 5.35 X10(3)/MCL (ref 4.5–11.5)
WBC #/AREA URNS AUTO: >100 /HPF
WBC CLUMPS UR QL AUTO: ABNORMAL

## 2024-06-03 PROCEDURE — 25000003 PHARM REV CODE 250: Performed by: STUDENT IN AN ORGANIZED HEALTH CARE EDUCATION/TRAINING PROGRAM

## 2024-06-03 PROCEDURE — 85025 COMPLETE CBC W/AUTO DIFF WBC: CPT | Performed by: PHYSICIAN ASSISTANT

## 2024-06-03 PROCEDURE — 81001 URINALYSIS AUTO W/SCOPE: CPT | Performed by: PHYSICIAN ASSISTANT

## 2024-06-03 PROCEDURE — 99284 EMERGENCY DEPT VISIT MOD MDM: CPT | Mod: 25

## 2024-06-03 PROCEDURE — 87086 URINE CULTURE/COLONY COUNT: CPT | Performed by: PHYSICIAN ASSISTANT

## 2024-06-03 PROCEDURE — 80053 COMPREHEN METABOLIC PANEL: CPT | Performed by: PHYSICIAN ASSISTANT

## 2024-06-03 RX ORDER — CEFDINIR 300 MG/1
300 CAPSULE ORAL 2 TIMES DAILY
Qty: 14 CAPSULE | Refills: 0 | Status: SHIPPED | OUTPATIENT
Start: 2024-06-03 | End: 2024-06-05 | Stop reason: ALTCHOICE

## 2024-06-03 RX ORDER — HYDROCODONE BITARTRATE AND ACETAMINOPHEN 5; 325 MG/1; MG/1
1 TABLET ORAL
Status: COMPLETED | OUTPATIENT
Start: 2024-06-03 | End: 2024-06-03

## 2024-06-03 RX ADMIN — HYDROCODONE BITARTRATE AND ACETAMINOPHEN 1 TABLET: 5; 325 TABLET ORAL at 10:06

## 2024-06-03 NOTE — ED PROVIDER NOTES
Encounter Date: 6/3/2024    SCRIBE #1 NOTE: I, Justin Velazquez, am scribing for, and in the presence of,  Elio Ramos IV, MD. I have scribed the entire note.       History     Chief Complaint   Patient presents with    Groin Pain     Reports small bump to left inguinal area x 3 days. Pain starting last night. Reports no trouble with urinating/BM's. Hx of surgery in March for scrotal abscess. Denies fevers, chills.     68 year old male with a hx of A fib, CHF, COPD, CAD, and HTN presents to the ED for groin pain. Pt states he had surgery for a left sided scrotal abscess on 3/15. Pt states the area was healing well, but he began to notice a bump around the left scrotal area a few days ago. Pt notes minimal pain to the area. Pt denies any drainage or fever.     The history is provided by the patient. No  was used.     Review of patient's allergies indicates:  No Known Allergies  Past Medical History:   Diagnosis Date    A-fib     Anticoagulant long-term use     Aortic aneurysm     Cataract     CHF (congestive heart failure)     Chronic atrial fibrillation     COPD (chronic obstructive pulmonary disease)     Coronary artery disease     HLD (hyperlipidemia)     Hypertension     Mitral regurgitation     PAD (peripheral artery disease)     Primary open angle glaucoma (POAG)      Past Surgical History:   Procedure Laterality Date    ANGIOGRAM, CORONARY, WITH LEFT HEART CATHETERIZATION N/A 3/26/2024    Procedure: Angiogram, Coronary, with Left Heart Cath;  Surgeon: Adriano Montiel MD;  Location: [facility] CATH LAB;  Service: Cardiology;  Laterality: N/A;    ATHERECTOMY OF PERIPHERAL VESSEL Left 09/12/2022    LEFT SFA ATHERECTOMY, BALLOON ANGIOPLASTY    CATARACT EXTRACTION W/  INTRAOCULAR LENS IMPLANT Left 3/9/2023    Procedure: EXTRACTION, CATARACT, WITH IOL INSERTION;  Surgeon: Georgi Borja MD;  Location: [facility] OR;  Service: Ophthalmology;  Laterality: Left;  19.5  mac    EGD, WITH CLOSED BIOPSY  3/22/2024     Procedure: EGD, WITH CLOSED BIOPSY;  Surgeon: Ronald Calderon MD;  Location: [facility] ENDOSCOPY;  Service: Gastroenterology;;    ESOPHAGOGASTRODUODENOSCOPY N/A 3/22/2024    Procedure: EGD;  Surgeon: Ronald Calderon MD;  Location: [facility] ENDOSCOPY;  Service: Gastroenterology;  Laterality: N/A;    Heart Stent N/A     > 10yrs. AGO    INCISION AND DRAINAGE N/A 3/18/2024    Procedure: Incision and Drainage;  Surgeon: Fahad Rivas MD;  Location: [facility] OR;  Service: Urology;  Laterality: N/A;  I&D SCROTAL ABSCESS    INSERTION OF STENT INTO PERIPHERAL VESSEL Right 10/17/2022    RIGHT SFA ATHERECTOMY, BALLOON ANGIOPLASTY, STENT; RIGHT ANTERIOR TIBIAL ARTERY ATHERECTOMY, BALLOON ANGIOPLASTY    ORCHIECTOMY Left 3/18/2024    Procedure: ORCHIECTOMY;  Surgeon: Fahad Rivas MD;  Location: [facility];  Service: Urology;  Laterality: Left;     Family History   Problem Relation Name Age of Onset    Cancer Mother      Hypertension Mother      Heart failure Father      Stroke Father      Hypertension Father      No Known Problems Sister       Social History     Tobacco Use    Smoking status: Every Day     Current packs/day: 0.25     Average packs/day: 0.3 packs/day for 40.0 years (10.0 ttl pk-yrs)     Types: Cigarettes     Passive exposure: Current    Smokeless tobacco: Never   Substance Use Topics    Alcohol use: Yes     Alcohol/week: 3.0 standard drinks of alcohol     Types: 3 Cans of beer per week     Comment: socially    Drug use: Yes     Frequency: 1.0 times per week     Types: Marijuana     Comment: no use in over 2 months     Review of Systems   Constitutional:  Negative for chills and fever.   HENT:  Negative for congestion, rhinorrhea and sore throat.    Eyes:  Negative for visual disturbance.   Respiratory:  Negative for cough and shortness of breath.    Cardiovascular:  Negative for chest pain.   Gastrointestinal:  Negative for abdominal pain, nausea and vomiting.   Genitourinary:  Negative  for dysuria and hematuria.        Small bump to left groin with minimal pain.    Musculoskeletal:  Negative for joint swelling.   Skin:  Negative for rash.   Neurological:  Negative for weakness.   Psychiatric/Behavioral:  Negative for confusion.    All other systems reviewed and are negative.      Physical Exam     Initial Vitals [06/03/24 0909]   BP Pulse Resp Temp SpO2   107/68 69 18 98.1 °F (36.7 °C) 97 %      MAP       --         Physical Exam    Nursing note and vitals reviewed.  Constitutional: He is not diaphoretic. No distress.   HENT:   Head: Normocephalic and atraumatic.   Neck: Neck supple.   Normal range of motion.  Cardiovascular:  Normal rate and regular rhythm.           Pulmonary/Chest: Breath sounds normal. No respiratory distress.   Abdominal: Abdomen is soft. He exhibits no distension. There is no abdominal tenderness.   Genitourinary:    Genitourinary Comments: Postsurgical changes noted to the left testicular area with an area of firm localized swelling. Area nontender and no signs of fluctuance. No surrounding drainage or erythema. Chaperoned by Manuel BRAGG        Musculoskeletal:         General: No edema.      Cervical back: Normal range of motion and neck supple.     Neurological: He is alert and oriented to person, place, and time. He has normal strength. No cranial nerve deficit or sensory deficit.   Skin: Skin is warm. Capillary refill takes less than 2 seconds.   Psychiatric: He has a normal mood and affect.         ED Course   Procedures  Labs Reviewed   COMPREHENSIVE METABOLIC PANEL - Abnormal; Notable for the following components:       Result Value    Potassium 3.4 (*)     Glucose 79 (*)     Protein Total 7.7 (*)     Globulin 4.0 (*)     Albumin/Globulin Ratio 0.9 (*)     All other components within normal limits   URINALYSIS, REFLEX TO URINE CULTURE - Abnormal; Notable for the following components:    Appearance, UA Turbid (*)     Blood, UA 1+ (*)     Leukocyte Esterase,  (*)      WBC, UA >100 (*)     WBC Clumps, UA Few (*)     Bacteria, UA Moderate (*)     Mucous, UA Trace (*)     Hyaline Casts, UA 3-5 (*)     All other components within normal limits   CBC WITH DIFFERENTIAL - Abnormal; Notable for the following components:    RBC 3.84 (*)     Hgb 11.0 (*)     Hct 35.6 (*)     MCHC 30.9 (*)     All other components within normal limits   CULTURE, URINE   CBC W/ AUTO DIFFERENTIAL    Narrative:     The following orders were created for panel order CBC auto differential.  Procedure                               Abnormality         Status                     ---------                               -----------         ------                     CBC with Differential[[id number]]       Abnormal            Final result                 Please view results for these tests on the individual orders.          Imaging Results              US Scrotum And Testicles (Final result)  Result time 06/03/24 12:48:33      Final result by Candido Lombardo MD (06/03/24 12:48:33)                   Impression:      1. Surgically absent.  Hypoechoic focus in the left inguinal region superior to the scrotum measures 2.3 cm and demonstrates mild surrounding vascularity without significant internal vascularity.  Finding is indeterminate with considerations including phlegmon or abscess.  Clinical correlation recommended.  2. No evidence of right epididymoorchitis or testicular torsion.      Electronically signed by: Candido Lombardo  Date:    06/03/2024  Time:    12:48               Narrative:    EXAMINATION:  US SCROTUM AND TESTICLES    CLINICAL HISTORY:  Other specified disorders of the male genital organs    TECHNIQUE:  Sonography of the scrotum and testes.    COMPARISON:  Ultrasound 04/30/2024    FINDINGS:  Right Testicle:    *Size: 3.4 x 2.5 x 1.5 cm  *Appearance: Normal.  *Flow: Normal arterial and venous flow  *Epididymis: Normal.  *Hydrocele: None.  *Varicocele: Present.  Left Testicle: Surgically absent.   Hypoechoic focus in the left inguinal region superior to the scrotum measures 2.3 x 2.0 x 0.9 cm and demonstrates mild surrounding vascularity without significant internal vascularity.    Other findings: Diffuse thickening of the scrotal skin..                                       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 Urology recommended clinic follow up and abx for his UTI. I called in abx to his pharmacy, called phone number on file in attempts to inform patient to take abx, no answer, I left a message stating where I sent the abx, that patient should take full course and follow up with his urologist     Differential diagnosis (including but not limited to):   Post op abscess, seroma, uti      Problems Addressed:  Scrotal swelling: acute illness or injury that poses a threat to life or bodily functions  Urinary tract infection without hematuria, site unspecified: acute illness or injury that poses a threat to life or bodily functions    Amount and/or Complexity of Data Reviewed  Labs: ordered.  Radiology: ordered.  Discussion of management or test interpretation with external provider(s): Discussed with Dr. Parks - see ED course      Risk  OTC drugs.  Prescription drug management.            Scribe Attestation:   Hank  #1: I performed the above scribed service and the documentation accurately describes the services I performed. I attest to the accuracy of the note.    Attending Attestation:           Physician Attestation for Scribe:  Physician Attestation Statement for Scribe #1: I, Elio Ramos IV, MD, reviewed documentation, as scribed by Justin Velazquez in my presence, and it is both accurate and complete.             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 Autumn Navarrete consulted  [AC]   1517 Patient told the nurse that he was leaving I did not have a chance to speak with him prior to him leaving however nursing had AMA discussion with patient and he signed out AMA [AC]      ED Course User Index  [AC] Elio Ramos IV, MD                             Clinical Impression:  Final diagnoses:  [N50.89] Scrotal swelling  [N39.0] Urinary tract infection without hematuria, site unspecified (Primary)          ED Disposition Condition    AMA Stable                Elio Ramos IV, MD  06/03/24 2004

## 2024-06-03 NOTE — ED NOTES
Pt informed of MD consult to Dr. Navarrete, however pt still insisted to leave without further treatment. Notified Dr. Ramos of pt's request. Pt left AMA. Education provided.

## 2024-06-04 ENCOUNTER — TELEPHONE (OUTPATIENT)
Dept: CARDIOLOGY | Facility: CLINIC | Age: 69
End: 2024-06-04
Payer: MEDICARE

## 2024-06-04 NOTE — TELEPHONE ENCOUNTER
Haley with [facility] called stating needing setup a [id number] for member's AICD placement. Will need to send to reviewer by end of tomorrow. Please contact as soon as possible.

## 2024-06-05 LAB — BACTERIA UR CULT: ABNORMAL

## 2024-06-05 RX ORDER — AMOXICILLIN AND CLAVULANATE POTASSIUM 875; 125 MG/1; MG/1
1 TABLET, FILM COATED ORAL 2 TIMES DAILY
Qty: 14 TABLET | Refills: 0 | Status: SHIPPED | OUTPATIENT
Start: 2024-06-05

## 2024-06-06 ENCOUNTER — TELEPHONE (OUTPATIENT)
Dept: UROLOGY | Facility: HOSPITAL | Age: 69
End: 2024-06-06
Payer: MEDICARE

## 2024-06-07 NOTE — TELEPHONE ENCOUNTER
I was going to treat the patient for the positive urine culture however when entering the medication I noted that the emergency room at [facility] had already changed his medication to the correct antibiotic.

## 2024-06-07 NOTE — PHYSICIAN QUERY
Please clarify Scrotal cellulitis as it relates to the procedure status post left orchiectomy.      Present, but not a complication of the procedure

## 2024-06-11 ENCOUNTER — HOSPITAL ENCOUNTER (EMERGENCY)
Facility: HOSPITAL | Age: 69
Discharge: HOME | End: 2024-06-11
Attending: INTERNAL MEDICINE
Payer: MEDICARE

## 2024-06-11 ENCOUNTER — CLINICAL SUPPORT (OUTPATIENT)
Dept: CARDIOLOGY | Facility: CLINIC | Age: 69
End: 2024-06-11
Payer: MEDICARE

## 2024-06-11 VITALS
TEMPERATURE: 98 F | RESPIRATION RATE: 18 BRPM | WEIGHT: 197.75 LBS | DIASTOLIC BLOOD PRESSURE: 88 MMHG | OXYGEN SATURATION: 99 % | BODY MASS INDEX: 26.82 KG/M2 | HEART RATE: 68 BPM | SYSTOLIC BLOOD PRESSURE: 130 MMHG

## 2024-06-11 DIAGNOSIS — I50.9 CHRONIC CONGESTIVE HEART FAILURE, UNSPECIFIED HEART FAILURE TYPE: ICD-10-CM

## 2024-06-11 DIAGNOSIS — I48.91 ATRIAL FIBRILLATION, UNSPECIFIED TYPE: Primary | ICD-10-CM

## 2024-06-11 DIAGNOSIS — E87.6 HYPOKALEMIA: Primary | ICD-10-CM

## 2024-06-11 DIAGNOSIS — E87.6 HYPOKALEMIA: ICD-10-CM

## 2024-06-11 DIAGNOSIS — E83.42 HYPOMAGNESEMIA: ICD-10-CM

## 2024-06-11 DIAGNOSIS — R06.02 SOB (SHORTNESS OF BREATH): ICD-10-CM

## 2024-06-11 DIAGNOSIS — I47.20 VENTRICULAR TACHYCARDIA: ICD-10-CM

## 2024-06-11 LAB
ANION GAP SERPL CALC-SCNC: 11 MEQ/L
APTT PPP: 48.8 SECONDS (ref 23.2–33.7)
BASOPHILS # BLD AUTO: 0.03 X10(3)/MCL
BASOPHILS NFR BLD AUTO: 0.6 %
BUN SERPL-MCNC: 14 MG/DL (ref 8.4–25.7)
CALCIUM SERPL-MCNC: 9.5 MG/DL (ref 8.8–10)
CHLORIDE SERPL-SCNC: 106 MMOL/L (ref 98–107)
CO2 SERPL-SCNC: 25 MMOL/L (ref 23–31)
CREAT SERPL-MCNC: 0.9 MG/DL (ref 0.73–1.18)
CREAT/UREA NIT SERPL: 16
EOSINOPHIL # BLD AUTO: 0.07 X10(3)/MCL (ref 0–0.9)
EOSINOPHIL NFR BLD AUTO: 1.4 %
ERYTHROCYTE [DISTWIDTH] IN BLOOD BY AUTOMATED COUNT: 15.6 % (ref 11.5–17)
GFR SERPLBLD CREATININE-BSD FMLA CKD-EPI: >60 ML/MIN/1.73/M2
GLUCOSE SERPL-MCNC: 90 MG/DL (ref 82–115)
HCT VFR BLD AUTO: 34.5 % (ref 42–52)
HGB BLD-MCNC: 10.8 G/DL (ref 14–18)
IMM GRANULOCYTES # BLD AUTO: 0.02 X10(3)/MCL (ref 0–0.04)
IMM GRANULOCYTES NFR BLD AUTO: 0.4 %
INR PPP: 2.7
LYMPHOCYTES # BLD AUTO: 1.46 X10(3)/MCL (ref 0.6–4.6)
LYMPHOCYTES NFR BLD AUTO: 30.2 %
MAGNESIUM SERPL-MCNC: 1.6 MG/DL (ref 1.6–2.6)
MCH RBC QN AUTO: 29 PG (ref 27–31)
MCHC RBC AUTO-ENTMCNC: 31.3 G/DL (ref 33–36)
MCV RBC AUTO: 92.5 FL (ref 80–94)
MONOCYTES # BLD AUTO: 0.49 X10(3)/MCL (ref 0.1–1.3)
MONOCYTES NFR BLD AUTO: 10.1 %
NEUTROPHILS # BLD AUTO: 2.77 X10(3)/MCL (ref 2.1–9.2)
NEUTROPHILS NFR BLD AUTO: 57.3 %
NRBC BLD AUTO-RTO: 0 %
OHS QRS DURATION: 96 MS
OHS QTC CALCULATION: 478 MS
PLATELET # BLD AUTO: 197 X10(3)/MCL (ref 130–400)
PMV BLD AUTO: 10.9 FL (ref 7.4–10.4)
POTASSIUM SERPL-SCNC: 3 MMOL/L (ref 3.5–5.1)
PROTHROMBIN TIME: 28.6 SECONDS (ref 11.4–14)
RBC # BLD AUTO: 3.73 X10(6)/MCL (ref 4.7–6.1)
SODIUM SERPL-SCNC: 142 MMOL/L (ref 136–145)
WBC # SPEC AUTO: 4.84 X10(3)/MCL (ref 4.5–11.5)

## 2024-06-11 PROCEDURE — 99284 EMERGENCY DEPT VISIT MOD MDM: CPT | Mod: 25,27

## 2024-06-11 PROCEDURE — 93005 ELECTROCARDIOGRAM TRACING: CPT

## 2024-06-11 PROCEDURE — 99213 OFFICE O/P EST LOW 20 MIN: CPT | Mod: PBBFAC,25

## 2024-06-11 PROCEDURE — 80048 BASIC METABOLIC PNL TOTAL CA: CPT

## 2024-06-11 PROCEDURE — 85025 COMPLETE CBC W/AUTO DIFF WBC: CPT

## 2024-06-11 PROCEDURE — 85730 THROMBOPLASTIN TIME PARTIAL: CPT

## 2024-06-11 PROCEDURE — 63600175 PHARM REV CODE 636 W HCPCS: Performed by: INTERNAL MEDICINE

## 2024-06-11 PROCEDURE — 25000003 PHARM REV CODE 250

## 2024-06-11 PROCEDURE — 83735 ASSAY OF MAGNESIUM: CPT

## 2024-06-11 PROCEDURE — 85610 PROTHROMBIN TIME: CPT

## 2024-06-11 PROCEDURE — 36415 COLL VENOUS BLD VENIPUNCTURE: CPT

## 2024-06-11 PROCEDURE — 96365 THER/PROPH/DIAG IV INF INIT: CPT

## 2024-06-11 RX ORDER — MAGNESIUM SULFATE HEPTAHYDRATE 40 MG/ML
2 INJECTION, SOLUTION INTRAVENOUS
Status: COMPLETED | OUTPATIENT
Start: 2024-06-11 | End: 2024-06-11

## 2024-06-11 RX ORDER — POTASSIUM CHLORIDE 20 MEQ/1
40 TABLET, EXTENDED RELEASE ORAL DAILY
Qty: 4 TABLET | Refills: 0 | Status: SHIPPED | OUTPATIENT
Start: 2024-06-11 | End: 2024-06-14

## 2024-06-11 RX ORDER — LANOLIN ALCOHOL/MO/W.PET/CERES
400 CREAM (GRAM) TOPICAL 2 TIMES DAILY
Qty: 14 TABLET | Refills: 0 | Status: SHIPPED | OUTPATIENT
Start: 2024-06-11 | End: 2024-06-18

## 2024-06-11 RX ORDER — MAGNESIUM 250 MG
240 TABLET ORAL DAILY
Qty: 30 EACH | Refills: 0 | Status: SHIPPED | OUTPATIENT
Start: 2024-06-11 | End: 2024-06-11 | Stop reason: SDUPTHER

## 2024-06-11 RX ADMIN — MAGNESIUM SULFATE HEPTAHYDRATE 2 G: 40 INJECTION, SOLUTION INTRAVENOUS at 05:06

## 2024-06-11 RX ADMIN — POTASSIUM BICARBONATE 50 MEQ: 977.5 TABLET, EFFERVESCENT ORAL at 05:06

## 2024-06-11 NOTE — ED PROVIDER NOTES
Encounter Date: 6/11/2024       History     Chief Complaint   Patient presents with    Abnormal Lab     PT INSTRUCTED TO COME TO ER FOR K+ 3.O  CO MILD SOB AND CHEST DISCOMFORT THIS PM.  EKG OBTAINED.      68 y.o male presents from clinic with abnormal lab. Potassium of 3.0. Patient states cardiology has been watching his potassium and recently prescribed oral potassium pills that he picked up this morning. He has not taken any potassium prior to arrival. Denies shortness of breath, fever, chills, or current chest pain.    The history is provided by the patient.     Review of patient's allergies indicates:  No Known Allergies  Past Medical History:   Diagnosis Date    A-fib     Anticoagulant long-term use     Aortic aneurysm     Cataract     CHF (congestive heart failure)     Chronic atrial fibrillation     COPD (chronic obstructive pulmonary disease)     Coronary artery disease     HLD (hyperlipidemia)     Hypertension     Mitral regurgitation     PAD (peripheral artery disease)     Primary open angle glaucoma (POAG)      Past Surgical History:   Procedure Laterality Date    ANGIOGRAM, CORONARY, WITH LEFT HEART CATHETERIZATION N/A 3/26/2024    Procedure: Angiogram, Coronary, with Left Heart Cath;  Surgeon: Adriano Montiel MD;  Location: [facility] CATH LAB;  Service: Cardiology;  Laterality: N/A;    ATHERECTOMY OF PERIPHERAL VESSEL Left 09/12/2022    LEFT SFA ATHERECTOMY, BALLOON ANGIOPLASTY    CATARACT EXTRACTION W/  INTRAOCULAR LENS IMPLANT Left 3/9/2023    Procedure: EXTRACTION, CATARACT, WITH IOL INSERTION;  Surgeon: Georgi Borja MD;  Location: [facility];  Service: Ophthalmology;  Laterality: Left;  19.5  mac    EGD, WITH CLOSED BIOPSY  3/22/2024    Procedure: EGD, WITH CLOSED BIOPSY;  Surgeon: Ronald Calderon MD;  Location: [facility] ENDOSCOPY;  Service: Gastroenterology;;    ESOPHAGOGASTRODUODENOSCOPY N/A 3/22/2024    Procedure: EGD;  Surgeon: Ronald Calderon MD;  Location: [facility] ENDOSCOPY;   Service: Gastroenterology;  Laterality: N/A;    Heart Stent N/A     > 10yrs. AGO    INCISION AND DRAINAGE N/A 3/18/2024    Procedure: Incision and Drainage;  Surgeon: Fahad Rivas MD;  Location: [facility] OR;  Service: Urology;  Laterality: N/A;  I&D SCROTAL ABSCESS    INSERTION OF STENT INTO PERIPHERAL VESSEL Right 10/17/2022    RIGHT SFA ATHERECTOMY, BALLOON ANGIOPLASTY, STENT; RIGHT ANTERIOR TIBIAL ARTERY ATHERECTOMY, BALLOON ANGIOPLASTY    ORCHIECTOMY Left 3/18/2024    Procedure: ORCHIECTOMY;  Surgeon: Fahad Rivas MD;  Location: [facility] OR;  Service: Urology;  Laterality: Left;     Family History   Problem Relation Name Age of Onset    Cancer Mother      Hypertension Mother      Heart failure Father      Stroke Father      Hypertension Father      No Known Problems Sister       Social History     Tobacco Use    Smoking status: Every Day     Current packs/day: 0.25     Average packs/day: 0.3 packs/day for 40.0 years (10.0 ttl pk-yrs)     Types: Cigarettes     Passive exposure: Current    Smokeless tobacco: Never   Substance Use Topics    Alcohol use: Yes     Alcohol/week: 3.0 standard drinks of alcohol     Types: 3 Cans of beer per week     Comment: socially    Drug use: Not Currently     Frequency: 1.0 times per week     Types: Marijuana     Comment: no use in over 2 months     Review of Systems   Constitutional:  Negative for chills, fatigue and fever.   Respiratory:  Negative for shortness of breath.    Cardiovascular:  Positive for chest pain (chronic, intermittent, none at time of presentation). Negative for leg swelling.   Gastrointestinal:  Negative for abdominal pain, diarrhea, nausea and vomiting.       Physical Exam     Initial Vitals [06/11/24 1557]   BP Pulse Resp Temp SpO2   111/77 65 18 97.2 °F (36.2 °C) 95 %      MAP       --         Physical Exam    Nursing note and vitals reviewed.  Constitutional: He appears well-developed. No distress.   HENT:   Head: Normocephalic and atraumatic.    Nose: Nose normal.   Mouth/Throat: Oropharynx is clear and moist.   Neck: Neck supple. No JVD present.   Normal range of motion.  Cardiovascular:  Normal rate, normal heart sounds and intact distal pulses.           No murmur heard.  Atrial fibrillation   Pulmonary/Chest: Breath sounds normal. No respiratory distress. He has no rales.   Abdominal: Abdomen is soft. Bowel sounds are normal. He exhibits no distension. There is no abdominal tenderness.   Musculoskeletal:      Cervical back: Normal range of motion and neck supple.     Neurological: He is alert and oriented to person, place, and time. GCS score is 15. GCS eye subscore is 4. GCS verbal subscore is 5. GCS motor subscore is 6.   Skin: Skin is warm and dry. Capillary refill takes less than 2 seconds.         ED Course   Procedures  Labs Reviewed   MAGNESIUM - Normal     EKG Readings: (Independently Interpreted)   Rhythm: Atrial Fibrillation. Heart Rate: 79. Clinical Impression: Atrial Fibrillation with PVCs       Imaging Results    None          Medications   potassium bicarbonate disintegrating tablet 50 mEq (50 mEq Oral Given 6/11/24 1713)   magnesium sulfate 2g in water 50mL IVPB (premix) (0 g Intravenous Stopped 6/11/24 1914)     Medical Decision Making  68 y.o male presents from cardiology clinic for hypokalemia. Magnesium low normal on blood work in ED. Stable for discharge home with magnesium. Patient currently has a prescription for potassium 40 mEq daily, that he picked up prior to presentation at ED. Encouraged close follow up with PCP and cardiology.    Amount and/or Complexity of Data Reviewed  External Data Reviewed: labs.     Details: Labs from cardiology clinic reviewed, noted hypokalemia  Labs: ordered.     Details: Magnesium, low normal  ECG/medicine tests: ordered. Decision-making details documented in ED Course.    Risk  OTC drugs.  Prescription drug management.      Additional MDM:   Differential Diagnosis:   Other: The following  diagnoses were also considered and will be evaluated: hypokalemia, hypomagnesemia and arrythmia.           Attending Attestation:   Physician Attestation Statement for Resident:  As the supervising MD   Physician Attestation Statement: I have personally seen and examined this patient.   I agree with the above history.  -:   As the supervising MD I agree with the above PE.     As the supervising MD I agree with the above treatment, course, plan, and disposition.    I have reviewed and agree with the residents interpretation of the following: lab data and EKG.  I have reviewed the following: old records at this facility.                                       Clinical Impression:  Final diagnoses:  [R06.02] SOB (shortness of breath)  [E87.6] Hypokalemia (Primary)  [E83.42] Hypomagnesemia          ED Disposition Condition    Discharge Stable          ED Prescriptions       Medication Sig Dispense Start Date End Date Auth. Provider    magnesium oxide (MAG-OX) 400 mg (241.3 mg magnesium) tablet Take 1 tablet (400 mg total) by mouth 2 (two) times daily. for 7 days 14 tablet 6/11/2024 6/18/2024 Anushka Clarke DO          Follow-up Information       Follow up With Specialties Details Why Contact Info    Abiodun Recinos DO Internal Medicine Schedule an appointment as soon as possible for a visit in 3 days  [address] [facility] [address]  [phone]      [facility] Emergency Medicine  If symptoms worsen [address] [facility] [id number] [phone]             Anushka Clarke DO  Resident  06/11/24 1824       Anushka Clarke DO  Resident  06/11/24 1838       Obie Wright MD  06/11/24 3560

## 2024-06-12 LAB
OHS QRS DURATION: 102 MS
OHS QTC CALCULATION: 536 MS

## 2024-06-14 ENCOUNTER — LAB VISIT (OUTPATIENT)
Dept: LAB | Facility: HOSPITAL | Age: 69
End: 2024-06-14
Attending: INTERNAL MEDICINE
Payer: MEDICARE

## 2024-06-14 ENCOUNTER — HOSPITAL ENCOUNTER (EMERGENCY)
Facility: HOSPITAL | Age: 69
Discharge: HOME | End: 2024-06-14
Attending: EMERGENCY MEDICINE
Payer: MEDICARE

## 2024-06-14 VITALS
RESPIRATION RATE: 18 BRPM | TEMPERATURE: 97 F | OXYGEN SATURATION: 97 % | SYSTOLIC BLOOD PRESSURE: 122 MMHG | WEIGHT: 197.31 LBS | BODY MASS INDEX: 26.73 KG/M2 | DIASTOLIC BLOOD PRESSURE: 82 MMHG | HEART RATE: 77 BPM | HEIGHT: 72 IN

## 2024-06-14 DIAGNOSIS — I50.20 HFREF (HEART FAILURE WITH REDUCED EJECTION FRACTION): ICD-10-CM

## 2024-06-14 DIAGNOSIS — I50.9 CONGESTIVE HEART FAILURE: ICD-10-CM

## 2024-06-14 DIAGNOSIS — I47.20 VENTRICULAR TACHYCARDIA: ICD-10-CM

## 2024-06-14 DIAGNOSIS — I48.91 ATRIAL FIBRILLATION, UNSPECIFIED TYPE: ICD-10-CM

## 2024-06-14 DIAGNOSIS — E87.6 HYPOKALEMIA: Primary | ICD-10-CM

## 2024-06-14 DIAGNOSIS — I50.9 CHRONIC CONGESTIVE HEART FAILURE, UNSPECIFIED HEART FAILURE TYPE: ICD-10-CM

## 2024-06-14 DIAGNOSIS — R10.32 LEFT LOWER QUADRANT ABDOMINAL PAIN: ICD-10-CM

## 2024-06-14 LAB
ALBUMIN SERPL-MCNC: 3.6 G/DL (ref 3.4–4.8)
ALBUMIN/GLOB SERPL: 0.9 RATIO (ref 1.1–2)
ALP SERPL-CCNC: 124 UNIT/L (ref 40–150)
ALT SERPL-CCNC: 11 UNIT/L (ref 0–55)
ANION GAP SERPL CALC-SCNC: 12 MEQ/L
ANION GAP SERPL CALC-SCNC: 12 MEQ/L
AST SERPL-CCNC: 28 UNIT/L (ref 5–34)
BASOPHILS # BLD AUTO: 0.04 X10(3)/MCL
BASOPHILS NFR BLD AUTO: 0.7 %
BILIRUB SERPL-MCNC: 1.3 MG/DL
BNP BLD-MCNC: 918 PG/ML
BUN SERPL-MCNC: 11.8 MG/DL (ref 8.4–25.7)
BUN SERPL-MCNC: 13.5 MG/DL (ref 8.4–25.7)
CALCIUM SERPL-MCNC: 9.5 MG/DL (ref 8.8–10)
CALCIUM SERPL-MCNC: 9.7 MG/DL (ref 8.8–10)
CHLORIDE SERPL-SCNC: 107 MMOL/L (ref 98–107)
CHLORIDE SERPL-SCNC: 108 MMOL/L (ref 98–107)
CO2 SERPL-SCNC: 23 MMOL/L (ref 23–31)
CO2 SERPL-SCNC: 25 MMOL/L (ref 23–31)
CREAT SERPL-MCNC: 0.89 MG/DL (ref 0.73–1.18)
CREAT SERPL-MCNC: 0.96 MG/DL (ref 0.73–1.18)
CREAT/UREA NIT SERPL: 13
CREAT/UREA NIT SERPL: 14
EOSINOPHIL # BLD AUTO: 0.05 X10(3)/MCL (ref 0–0.9)
EOSINOPHIL NFR BLD AUTO: 0.9 %
ERYTHROCYTE [DISTWIDTH] IN BLOOD BY AUTOMATED COUNT: 15.3 % (ref 11.5–17)
GFR SERPLBLD CREATININE-BSD FMLA CKD-EPI: >60 ML/MIN/1.73/M2
GFR SERPLBLD CREATININE-BSD FMLA CKD-EPI: >60 ML/MIN/1.73/M2
GLOBULIN SER-MCNC: 3.8 GM/DL (ref 2.4–3.5)
GLUCOSE SERPL-MCNC: 102 MG/DL (ref 82–115)
GLUCOSE SERPL-MCNC: 74 MG/DL (ref 82–115)
HCT VFR BLD AUTO: 34.1 % (ref 42–52)
HGB BLD-MCNC: 10.5 G/DL (ref 14–18)
HOLD SPECIMEN: NORMAL
HOLD SPECIMEN: NORMAL
IMM GRANULOCYTES # BLD AUTO: 0.02 X10(3)/MCL (ref 0–0.04)
IMM GRANULOCYTES NFR BLD AUTO: 0.4 %
LYMPHOCYTES # BLD AUTO: 1.24 X10(3)/MCL (ref 0.6–4.6)
LYMPHOCYTES NFR BLD AUTO: 22.3 %
MAGNESIUM SERPL-MCNC: 1.7 MG/DL (ref 1.6–2.6)
MCH RBC QN AUTO: 28.1 PG (ref 27–31)
MCHC RBC AUTO-ENTMCNC: 30.8 G/DL (ref 33–36)
MCV RBC AUTO: 91.2 FL (ref 80–94)
MONOCYTES # BLD AUTO: 0.54 X10(3)/MCL (ref 0.1–1.3)
MONOCYTES NFR BLD AUTO: 9.7 %
NEUTROPHILS # BLD AUTO: 3.68 X10(3)/MCL (ref 2.1–9.2)
NEUTROPHILS NFR BLD AUTO: 66 %
NRBC BLD AUTO-RTO: 0 %
OHS QRS DURATION: 98 MS
OHS QTC CALCULATION: 461 MS
PLATELET # BLD AUTO: 205 X10(3)/MCL (ref 130–400)
PMV BLD AUTO: 10.8 FL (ref 7.4–10.4)
POTASSIUM SERPL-SCNC: 2.9 MMOL/L (ref 3.5–5.1)
POTASSIUM SERPL-SCNC: 2.9 MMOL/L (ref 3.5–5.1)
PROT SERPL-MCNC: 7.4 GM/DL (ref 5.8–7.6)
RBC # BLD AUTO: 3.74 X10(6)/MCL (ref 4.7–6.1)
SODIUM SERPL-SCNC: 143 MMOL/L (ref 136–145)
SODIUM SERPL-SCNC: 144 MMOL/L (ref 136–145)
TROPONIN I SERPL-MCNC: 0.01 NG/ML (ref 0–0.04)
WBC # BLD AUTO: 5.57 X10(3)/MCL (ref 4.5–11.5)

## 2024-06-14 PROCEDURE — 93005 ELECTROCARDIOGRAM TRACING: CPT

## 2024-06-14 PROCEDURE — 96365 THER/PROPH/DIAG IV INF INIT: CPT

## 2024-06-14 PROCEDURE — 80048 BASIC METABOLIC PNL TOTAL CA: CPT

## 2024-06-14 PROCEDURE — 80053 COMPREHEN METABOLIC PANEL: CPT | Performed by: NURSE PRACTITIONER

## 2024-06-14 PROCEDURE — 83880 ASSAY OF NATRIURETIC PEPTIDE: CPT | Performed by: NURSE PRACTITIONER

## 2024-06-14 PROCEDURE — 96375 TX/PRO/DX INJ NEW DRUG ADDON: CPT

## 2024-06-14 PROCEDURE — 99285 EMERGENCY DEPT VISIT HI MDM: CPT | Mod: 25

## 2024-06-14 PROCEDURE — 25000003 PHARM REV CODE 250: Performed by: NURSE PRACTITIONER

## 2024-06-14 PROCEDURE — 36415 COLL VENOUS BLD VENIPUNCTURE: CPT

## 2024-06-14 PROCEDURE — 83735 ASSAY OF MAGNESIUM: CPT | Performed by: NURSE PRACTITIONER

## 2024-06-14 PROCEDURE — 84484 ASSAY OF TROPONIN QUANT: CPT | Performed by: NURSE PRACTITIONER

## 2024-06-14 PROCEDURE — 63600175 PHARM REV CODE 636 W HCPCS: Performed by: NURSE PRACTITIONER

## 2024-06-14 PROCEDURE — 85025 COMPLETE CBC W/AUTO DIFF WBC: CPT | Performed by: NURSE PRACTITIONER

## 2024-06-14 RX ORDER — POTASSIUM CHLORIDE 20 MEQ/1
20 TABLET, EXTENDED RELEASE ORAL DAILY
Qty: 4 TABLET | Refills: 0 | Status: SHIPPED | OUTPATIENT
Start: 2024-06-14

## 2024-06-14 RX ORDER — FUROSEMIDE 10 MG/ML
20 INJECTION INTRAMUSCULAR; INTRAVENOUS
Status: COMPLETED | OUTPATIENT
Start: 2024-06-14 | End: 2024-06-14

## 2024-06-14 RX ORDER — SODIUM CHLORIDE 9 MG/ML
1000 INJECTION, SOLUTION INTRAVENOUS
Status: COMPLETED | OUTPATIENT
Start: 2024-06-14 | End: 2024-06-14

## 2024-06-14 RX ORDER — SPIRONOLACTONE 50 MG/1
50 TABLET, FILM COATED ORAL DAILY
Qty: 90 TABLET | Refills: 3 | Status: SHIPPED | OUTPATIENT
Start: 2024-06-14

## 2024-06-14 RX ORDER — POTASSIUM CHLORIDE 7.45 MG/ML
10 INJECTION INTRAVENOUS ONCE
Status: COMPLETED | OUTPATIENT
Start: 2024-06-14 | End: 2024-06-14

## 2024-06-14 RX ORDER — POTASSIUM CHLORIDE 20 MEQ/1
40 TABLET, EXTENDED RELEASE ORAL
Status: COMPLETED | OUTPATIENT
Start: 2024-06-14 | End: 2024-06-14

## 2024-06-14 RX ADMIN — POTASSIUM CHLORIDE 40 MEQ: 1500 TABLET, EXTENDED RELEASE ORAL at 12:06

## 2024-06-14 RX ADMIN — SODIUM CHLORIDE 1000 ML: 9 INJECTION, SOLUTION INTRAVENOUS at 12:06

## 2024-06-14 RX ADMIN — POTASSIUM CHLORIDE 10 MEQ: 7.46 INJECTION, SOLUTION INTRAVENOUS at 12:06

## 2024-06-14 RX ADMIN — FUROSEMIDE 20 MG: 10 INJECTION, SOLUTION INTRAMUSCULAR; INTRAVENOUS at 01:06

## 2024-06-14 NOTE — DISCHARGE INSTRUCTIONS
Follow up with your primary care physician in 3-5 days for follow up evaluation.  Take medication as prescribed.  Return to the [facility] ED immediately for worsening chest pain, SOB, or fever.

## 2024-06-14 NOTE — ED PROVIDER NOTES
"Encounter Date: 6/14/2024       History     Chief Complaint   Patient presents with    Abnormal Lab     Reports low potassium with lab work this morning. Reports some weakness/dizziness, denies any current CP or SOB     Pt is a 68 y.o.male who presents to the [facility] ED for evaluation after being informed that his "potassium was low." Hx of atrial fibrillation, aortic aneurysm, CHF, COPD, CAD, PAD, POAG, anticoagulation therapy, and HTN. Currently on oral supplementation for both potassium and magnesium. Seen in the [facility] ED for same issue on 6/11. Reports mild SOB and muscle spasms at this time. Denies chest pain, fever, nausea, vomiting, or abdominal pain.      Review of patient's allergies indicates:  No Known Allergies  Past Medical History:   Diagnosis Date    A-fib     Anticoagulant long-term use     Aortic aneurysm     Cataract     CHF (congestive heart failure)     Chronic atrial fibrillation     COPD (chronic obstructive pulmonary disease)     Coronary artery disease     HLD (hyperlipidemia)     Hypertension     Mitral regurgitation     PAD (peripheral artery disease)     Primary open angle glaucoma (POAG)      Past Surgical History:   Procedure Laterality Date    ANGIOGRAM, CORONARY, WITH LEFT HEART CATHETERIZATION N/A 3/26/2024    Procedure: Angiogram, Coronary, with Left Heart Cath;  Surgeon: Adriano Montiel MD;  Location: [facility] CATH LAB;  Service: Cardiology;  Laterality: N/A;    ATHERECTOMY OF PERIPHERAL VESSEL Left 09/12/2022    LEFT SFA ATHERECTOMY, BALLOON ANGIOPLASTY    CATARACT EXTRACTION W/  INTRAOCULAR LENS IMPLANT Left 3/9/2023    Procedure: EXTRACTION, CATARACT, WITH IOL INSERTION;  Surgeon: Georgi Borja MD;  Location: [facility] OR;  Service: Ophthalmology;  Laterality: Left;  19.5  mac    EGD, WITH CLOSED BIOPSY  3/22/2024    Procedure: EGD, WITH CLOSED BIOPSY;  Surgeon: Ronald Calderon MD;  Location: [facility] ENDOSCOPY;  Service: Gastroenterology;;    ESOPHAGOGASTRODUODENOSCOPY N/A " 3/22/2024    Procedure: EGD;  Surgeon: Ronald Calderon MD;  Location: [facility] ENDOSCOPY;  Service: Gastroenterology;  Laterality: N/A;    Heart Stent N/A     > 10yrs. AGO    INCISION AND DRAINAGE N/A 3/18/2024    Procedure: Incision and Drainage;  Surgeon: Fahad Rivas MD;  Location: [facility] OR;  Service: Urology;  Laterality: N/A;  I&D SCROTAL ABSCESS    INSERTION OF STENT INTO PERIPHERAL VESSEL Right 10/17/2022    RIGHT SFA ATHERECTOMY, BALLOON ANGIOPLASTY, STENT; RIGHT ANTERIOR TIBIAL ARTERY ATHERECTOMY, BALLOON ANGIOPLASTY    ORCHIECTOMY Left 3/18/2024    Procedure: ORCHIECTOMY;  Surgeon: Fahad Rivas MD;  Location: [facility];  Service: Urology;  Laterality: Left;     Family History   Problem Relation Name Age of Onset    Cancer Mother      Hypertension Mother      Heart failure Father      Stroke Father      Hypertension Father      No Known Problems Sister       Social History     Tobacco Use    Smoking status: Every Day     Current packs/day: 0.25     Average packs/day: 0.3 packs/day for 40.0 years (10.0 ttl pk-yrs)     Types: Cigarettes     Passive exposure: Current    Smokeless tobacco: Never   Substance Use Topics    Alcohol use: Yes     Alcohol/week: 3.0 standard drinks of alcohol     Types: 3 Cans of beer per week     Comment: socially    Drug use: Not Currently     Frequency: 1.0 times per week     Types: Marijuana     Comment: no use in over 2 months     Review of Systems   Constitutional:  Negative for chills, diaphoresis, fatigue and fever.   HENT:  Negative for facial swelling, postnasal drip, rhinorrhea, sinus pressure, sinus pain, sore throat and trouble swallowing.    Respiratory:  Positive for shortness of breath. Negative for cough, chest tightness and wheezing.    Cardiovascular:  Negative for chest pain, palpitations and leg swelling.   Gastrointestinal:  Negative for abdominal pain, diarrhea, nausea and vomiting.   Genitourinary:  Negative for dysuria, flank pain,  hematuria and urgency.   Musculoskeletal:  Positive for myalgias. Negative for arthralgias and back pain.   Skin:  Negative for color change and rash.   Neurological:  Negative for dizziness, syncope, weakness and headaches.   Hematological:  Does not bruise/bleed easily.   All other systems reviewed and are negative.      Physical Exam     Initial Vitals [06/14/24 1045]   BP Pulse Resp Temp SpO2   122/82 77 18 97.2 °F (36.2 °C) 97 %      MAP       --         Physical Exam    Nursing note and vitals reviewed.  Constitutional: Vital signs are normal. He appears well-developed and well-nourished.   HENT:   Head: Normocephalic.   Nose: Nose normal.   Mouth/Throat: Oropharynx is clear and moist.   Eyes: Conjunctivae and EOM are normal. Pupils are equal, round, and reactive to light.   Neck: Neck supple.   Normal range of motion.  Cardiovascular:  Normal rate, regular rhythm, normal heart sounds and intact distal pulses.           Pulmonary/Chest: Effort normal and breath sounds normal. No respiratory distress. He has no wheezes. He has no rhonchi. He has no rales. He exhibits no tenderness.   Abdominal: Abdomen is soft and flat. Bowel sounds are normal. There is no abdominal tenderness. There is no rebound, no guarding, no tenderness at McBurney's point and negative Marques's sign.   Musculoskeletal:         General: Normal range of motion.      Cervical back: Normal range of motion and neck supple.     Neurological: He is alert and oriented to person, place, and time. He has normal strength.   Skin: Skin is warm and dry. Capillary refill takes less than 2 seconds.   Psychiatric: He has a normal mood and affect. His behavior is normal. Judgment and thought content normal.         ED Course   Procedures  Labs Reviewed   COMPREHENSIVE METABOLIC PANEL - Abnormal; Notable for the following components:       Result Value    Potassium 2.9 (*)     Glucose 74 (*)     Globulin 3.8 (*)     Albumin/Globulin Ratio 0.9 (*)     All  other components within normal limits   B-TYPE NATRIURETIC PEPTIDE - Abnormal; Notable for the following components:    Natriuretic Peptide 918.0 (*)     All other components within normal limits   CBC WITH DIFFERENTIAL - Abnormal; Notable for the following components:    RBC 3.74 (*)     Hgb 10.5 (*)     Hct 34.1 (*)     MCHC 30.8 (*)     MPV 10.8 (*)     All other components within normal limits   TROPONIN I - Normal   MAGNESIUM - Normal   CBC W/ AUTO DIFFERENTIAL    Narrative:     The following orders were created for panel order CBC auto differential.  Procedure                               Abnormality         Status                     ---------                               -----------         ------                     CBC with Differential[[id number]]       Abnormal            Final result                 Please view results for these tests on the individual orders.   EXTRA TUBES    Narrative:     The following orders were created for panel order EXTRA TUBES.  Procedure                               Abnormality         Status                     ---------                               -----------         ------                     Light Blue Top Hold[[id number]]                             Final result               Gold Top Hold[[id number]]                                   Final result                 Please view results for these tests on the individual orders.   LIGHT BLUE TOP HOLD   GOLD TOP HOLD        ECG Results              EKG 12-lead (In process)        Collection Time Result Time QRS Duration OHS QTC Calculation    06/14/24 11:00:20 06/14/24 11:12:46 98 461                     In process by Interface, Lab In [facility] (06/14/24 11:12:51)                   Narrative:    Test Reason : E87.6,    Vent. Rate : 066 BPM     Atrial Rate : 057 BPM     P-R Int : 000 ms          QRS Dur : 098 ms      QT Int : 440 ms       P-R-T Axes : 000 010 182 degrees     QTc Int : 461 ms    Undetermined rhythm  Anterior  infarct ,age undetermined  ST and T wave abnormality, consider lateral ischemia  Abnormal ECG  When compared with ECG of 11-JUN-2024 16:01,  Current undetermined rhythm precludes rhythm comparison, needs review  Nonspecific T wave abnormality, improved in Inferior leads  QT has shortened    Referred By: [email or web address]   SELF           Confirmed By:                                   Imaging Results              X-Ray Chest 1 View (Final result)  Result time 06/14/24 13:00:19      Final result by Lito Hurst MD (06/14/24 13:00:19)                   Impression:      Changes suggestive of a degree of pulmonary vascular congestion and cardiac decompensation.    Cardiomegaly      Electronically signed by: Lito Hurst  Date:    06/14/2024  Time:    13:00               Narrative:    EXAMINATION:  XR CHEST 1 VIEW    CPT 85081    CLINICAL HISTORY:  Heart failure, unspecified    COMPARISON:  April 26, 2024    FINDINGS:  Examination reveals mediastinal silhouette to be within normal limits cardiac silhouette is enlarged there is increase in interstitial and pulmonary vascular markings which may indicate some degree of pulmonary vascular congestion and cardiac decompensation.    There is improvement in the confluent opacities identified on the exam of April 26, 2024.    No focal consolidative changes                                       Medications   furosemide injection 20 mg (has no administration in time range)   potassium chloride SA CR tablet 40 mEq (40 mEq Oral Given 6/14/24 1219)   potassium chloride 10 mEq in 100 mL IVPB (10 mEq Intravenous New Bag 6/14/24 1219)   0.9%  NaCl infusion (1,000 mLs Intravenous New Bag 6/14/24 1218)     Medical Decision Making  Differential:  Hypokalemia  Hypomagnesemia  CHF  Anemia  Electrolyte abnormality  AMI    Amount and/or Complexity of Data Reviewed  Labs: ordered.  Radiology: ordered.    Risk  Prescription drug management.               ED Course as of 06/14/24 1337   Fri  Jun 14, 2024 1334 Pt endorses improvement in previous SOB symptoms. I will be continuing his oral potassium for home as well as provided strict follow up recommendations. Pt voiced agreement and understanding of plan. Denies further needs at this time. [JA]      ED Course User Index  [JA] Jerald Rocha Jr., FNP                           Clinical Impression:  Final diagnoses:  [E87.6] Hypokalemia (Primary)  [I50.9] Congestive heart failure          ED Disposition Condition    Discharge Stable          ED Prescriptions       Medication Sig Dispense Start Date End Date Auth. Provider    potassium chloride SA (K-DUR,KLOR-CON) 20 MEQ tablet Take 1 tablet (20 mEq total) by mouth once daily. 4 tablet 6/14/2024 -- Jerald Rocha Jr., MALI          Follow-up Information       Follow up With Specialties Details Why Contact Info    Abiodun Recinos, DO Internal Medicine In 3 days  [address] [facility] [address]  [phone]               Jerald Rocha Jr., FNP  06/14/24 7499

## 2024-06-17 ENCOUNTER — HOSPITAL ENCOUNTER (OUTPATIENT)
Facility: HOSPITAL | Age: 69
Discharge: HOME | End: 2024-06-17
Attending: INTERNAL MEDICINE | Admitting: INTERNAL MEDICINE
Payer: MEDICARE

## 2024-06-17 VITALS
BODY MASS INDEX: 26.61 KG/M2 | HEART RATE: 72 BPM | RESPIRATION RATE: 22 BRPM | DIASTOLIC BLOOD PRESSURE: 78 MMHG | HEIGHT: 72 IN | TEMPERATURE: 98 F | OXYGEN SATURATION: 97 % | WEIGHT: 196.5 LBS | SYSTOLIC BLOOD PRESSURE: 131 MMHG

## 2024-06-17 DIAGNOSIS — I47.20 VENTRICULAR TACHYCARDIA: ICD-10-CM

## 2024-06-17 DIAGNOSIS — I50.9 CHRONIC CONGESTIVE HEART FAILURE, UNSPECIFIED HEART FAILURE TYPE: ICD-10-CM

## 2024-06-17 LAB
ANION GAP SERPL CALC-SCNC: 12 MEQ/L
BASOPHILS # BLD AUTO: 0.02 X10(3)/MCL
BASOPHILS NFR BLD AUTO: 0.4 %
BUN SERPL-MCNC: 11.3 MG/DL (ref 8.4–25.7)
CALCIUM SERPL-MCNC: 9.2 MG/DL (ref 8.8–10)
CHLORIDE SERPL-SCNC: 108 MMOL/L (ref 98–107)
CO2 SERPL-SCNC: 20 MMOL/L (ref 23–31)
CREAT SERPL-MCNC: 0.94 MG/DL (ref 0.73–1.18)
CREAT/UREA NIT SERPL: 12
EOSINOPHIL # BLD AUTO: 0.08 X10(3)/MCL (ref 0–0.9)
EOSINOPHIL NFR BLD AUTO: 1.6 %
ERYTHROCYTE [DISTWIDTH] IN BLOOD BY AUTOMATED COUNT: 15.4 % (ref 11.5–17)
GFR SERPLBLD CREATININE-BSD FMLA CKD-EPI: >60 ML/MIN/1.73/M2
GLUCOSE SERPL-MCNC: 94 MG/DL (ref 82–115)
HCT VFR BLD AUTO: 34.6 % (ref 42–52)
HGB BLD-MCNC: 10.7 G/DL (ref 14–18)
IMM GRANULOCYTES # BLD AUTO: 0.01 X10(3)/MCL (ref 0–0.04)
IMM GRANULOCYTES NFR BLD AUTO: 0.2 %
LYMPHOCYTES # BLD AUTO: 2.06 X10(3)/MCL (ref 0.6–4.6)
LYMPHOCYTES NFR BLD AUTO: 40.4 %
MCH RBC QN AUTO: 28.7 PG (ref 27–31)
MCHC RBC AUTO-ENTMCNC: 30.9 G/DL (ref 33–36)
MCV RBC AUTO: 92.8 FL (ref 80–94)
MONOCYTES # BLD AUTO: 0.49 X10(3)/MCL (ref 0.1–1.3)
MONOCYTES NFR BLD AUTO: 9.6 %
NEUTROPHILS # BLD AUTO: 2.44 X10(3)/MCL (ref 2.1–9.2)
NEUTROPHILS NFR BLD AUTO: 47.8 %
NRBC BLD AUTO-RTO: 0 %
PLATELET # BLD AUTO: 189 X10(3)/MCL (ref 130–400)
PMV BLD AUTO: 10.9 FL (ref 7.4–10.4)
POTASSIUM SERPL-SCNC: 3.4 MMOL/L (ref 3.5–5.1)
RBC # BLD AUTO: 3.73 X10(6)/MCL (ref 4.7–6.1)
SODIUM SERPL-SCNC: 140 MMOL/L (ref 136–145)
WBC # BLD AUTO: 5.1 X10(3)/MCL (ref 4.5–11.5)

## 2024-06-17 PROCEDURE — 36415 COLL VENOUS BLD VENIPUNCTURE: CPT | Performed by: INTERNAL MEDICINE

## 2024-06-17 PROCEDURE — 80048 BASIC METABOLIC PNL TOTAL CA: CPT | Performed by: INTERNAL MEDICINE

## 2024-06-17 PROCEDURE — 85025 COMPLETE CBC W/AUTO DIFF WBC: CPT | Performed by: INTERNAL MEDICINE

## 2024-06-17 RX ORDER — DIPHENHYDRAMINE HCL 25 MG
25 CAPSULE ORAL ONCE
Status: DISCONTINUED | OUTPATIENT
Start: 2024-06-17 | End: 2024-06-17 | Stop reason: HOSPADM

## 2024-06-17 RX ORDER — SODIUM CHLORIDE 9 MG/ML
INJECTION, SOLUTION INTRAVENOUS ONCE
Status: DISCONTINUED | OUTPATIENT
Start: 2024-06-17 | End: 2024-06-17 | Stop reason: HOSPADM

## 2024-06-17 RX ORDER — DIAZEPAM 5 MG/1
5 TABLET ORAL EVERY 6 HOURS PRN
Status: DISCONTINUED | OUTPATIENT
Start: 2024-06-17 | End: 2024-06-17 | Stop reason: HOSPADM

## 2024-06-17 NOTE — PLAN OF CARE
Patient arrived with complaints of scrotal pain. He reports having an infection with pain, discomfort and difficulty sitting. Scrotal area red with a bump and a small spot that appears to be puss filled. Patient smelled of alcohol. He reported drinking a couple of beers yesterday. He reports SOB and stated he has difficulty lying. He went to the ER on 6/3/24 and was prescribed a 7 day course of antibiotics. He states he has not finished antibiotics. Labs collected.Notified provider DR Verduzco of lab results and assessment findings at 0701 am. Orders to cancel the case, schedule a follow up in clinic. Postpone procedure until infection resolves. Instruction given to patient to follow up with PCP for scrotal abscess, return to clinic on 7/16/24 at 3:30 pm to reevaluation of AICD, ER precautions, restart Xarelto, and take all medications as directed. Patient verbalized understanding.

## 2024-06-20 ENCOUNTER — HOSPITAL ENCOUNTER (EMERGENCY)
Facility: HOSPITAL | Age: 69
Discharge: HOME | End: 2024-06-20
Attending: EMERGENCY MEDICINE
Payer: MEDICARE

## 2024-06-20 VITALS
OXYGEN SATURATION: 100 % | HEART RATE: 65 BPM | RESPIRATION RATE: 16 BRPM | SYSTOLIC BLOOD PRESSURE: 106 MMHG | TEMPERATURE: 98 F | WEIGHT: 192.25 LBS | HEIGHT: 72 IN | BODY MASS INDEX: 26.04 KG/M2 | DIASTOLIC BLOOD PRESSURE: 68 MMHG

## 2024-06-20 DIAGNOSIS — E87.6 HYPOKALEMIA: ICD-10-CM

## 2024-06-20 DIAGNOSIS — N30.90 CYSTITIS: Primary | ICD-10-CM

## 2024-06-20 LAB
ANION GAP SERPL CALC-SCNC: 10 MEQ/L
BACTERIA #/AREA URNS AUTO: ABNORMAL /HPF
BASOPHILS # BLD AUTO: 0.03 X10(3)/MCL
BASOPHILS NFR BLD AUTO: 0.5 %
BILIRUB UR QL STRIP.AUTO: NEGATIVE
BUN SERPL-MCNC: 10.4 MG/DL (ref 8.4–25.7)
CALCIUM SERPL-MCNC: 9.2 MG/DL (ref 8.8–10)
CHLORIDE SERPL-SCNC: 106 MMOL/L (ref 98–107)
CLARITY UR: CLEAR
CO2 SERPL-SCNC: 23 MMOL/L (ref 23–31)
COLOR UR AUTO: ABNORMAL
CREAT SERPL-MCNC: 0.96 MG/DL (ref 0.73–1.18)
CREAT/UREA NIT SERPL: 11
EOSINOPHIL # BLD AUTO: 0.08 X10(3)/MCL (ref 0–0.9)
EOSINOPHIL NFR BLD AUTO: 1.2 %
ERYTHROCYTE [DISTWIDTH] IN BLOOD BY AUTOMATED COUNT: 15.7 % (ref 11.5–17)
GFR SERPLBLD CREATININE-BSD FMLA CKD-EPI: >60 ML/MIN/1.73/M2
GLUCOSE SERPL-MCNC: 117 MG/DL (ref 82–115)
GLUCOSE UR QL STRIP: NORMAL
HCT VFR BLD AUTO: 34.1 % (ref 42–52)
HGB BLD-MCNC: 10.5 G/DL (ref 14–18)
HGB UR QL STRIP: ABNORMAL
HOLD SPECIMEN: NORMAL
HOLD SPECIMEN: NORMAL
HYALINE CASTS #/AREA URNS LPF: ABNORMAL /LPF
IMM GRANULOCYTES # BLD AUTO: 0.02 X10(3)/MCL (ref 0–0.04)
IMM GRANULOCYTES NFR BLD AUTO: 0.3 %
KETONES UR QL STRIP: NEGATIVE
LEUKOCYTE ESTERASE UR QL STRIP: 500
LYMPHOCYTES # BLD AUTO: 0.94 X10(3)/MCL (ref 0.6–4.6)
LYMPHOCYTES NFR BLD AUTO: 14.4 %
MCH RBC QN AUTO: 28 PG (ref 27–31)
MCHC RBC AUTO-ENTMCNC: 30.8 G/DL (ref 33–36)
MCV RBC AUTO: 90.9 FL (ref 80–94)
MONOCYTES # BLD AUTO: 0.76 X10(3)/MCL (ref 0.1–1.3)
MONOCYTES NFR BLD AUTO: 11.6 %
MUCOUS THREADS URNS QL MICRO: ABNORMAL /LPF
NEUTROPHILS # BLD AUTO: 4.7 X10(3)/MCL (ref 2.1–9.2)
NEUTROPHILS NFR BLD AUTO: 72 %
NITRITE UR QL STRIP: NEGATIVE
NRBC BLD AUTO-RTO: 0 %
PH UR STRIP: 5.5 [PH]
PLATELET # BLD AUTO: 188 X10(3)/MCL (ref 130–400)
PMV BLD AUTO: 10.3 FL (ref 7.4–10.4)
POTASSIUM SERPL-SCNC: 2.8 MMOL/L (ref 3.5–5.1)
PROT UR QL STRIP: ABNORMAL
RBC # BLD AUTO: 3.75 X10(6)/MCL (ref 4.7–6.1)
RBC #/AREA URNS AUTO: ABNORMAL /HPF
SODIUM SERPL-SCNC: 139 MMOL/L (ref 136–145)
SP GR UR STRIP.AUTO: 1.01 (ref 1–1.03)
SQUAMOUS #/AREA URNS LPF: ABNORMAL /HPF
UROBILINOGEN UR STRIP-ACNC: NORMAL
WBC # BLD AUTO: 6.53 X10(3)/MCL (ref 4.5–11.5)
WBC #/AREA URNS AUTO: ABNORMAL /HPF

## 2024-06-20 PROCEDURE — 80048 BASIC METABOLIC PNL TOTAL CA: CPT | Performed by: EMERGENCY MEDICINE

## 2024-06-20 PROCEDURE — 85025 COMPLETE CBC W/AUTO DIFF WBC: CPT | Performed by: EMERGENCY MEDICINE

## 2024-06-20 PROCEDURE — 81015 MICROSCOPIC EXAM OF URINE: CPT | Performed by: EMERGENCY MEDICINE

## 2024-06-20 PROCEDURE — 63600175 PHARM REV CODE 636 W HCPCS: Performed by: EMERGENCY MEDICINE

## 2024-06-20 PROCEDURE — 87086 URINE CULTURE/COLONY COUNT: CPT | Performed by: EMERGENCY MEDICINE

## 2024-06-20 PROCEDURE — 25000003 PHARM REV CODE 250: Performed by: EMERGENCY MEDICINE

## 2024-06-20 PROCEDURE — 99284 EMERGENCY DEPT VISIT MOD MDM: CPT | Mod: 25

## 2024-06-20 PROCEDURE — 96372 THER/PROPH/DIAG INJ SC/IM: CPT | Performed by: EMERGENCY MEDICINE

## 2024-06-20 RX ORDER — PHENAZOPYRIDINE HYDROCHLORIDE 100 MG/1
100 TABLET, FILM COATED ORAL
Status: COMPLETED | OUTPATIENT
Start: 2024-06-20 | End: 2024-06-20

## 2024-06-20 RX ORDER — KETOROLAC TROMETHAMINE 30 MG/ML
15 INJECTION, SOLUTION INTRAMUSCULAR; INTRAVENOUS
Status: COMPLETED | OUTPATIENT
Start: 2024-06-20 | End: 2024-06-20

## 2024-06-20 RX ORDER — POTASSIUM CHLORIDE 20 MEQ/1
20 TABLET, EXTENDED RELEASE ORAL 2 TIMES DAILY
Qty: 30 TABLET | Refills: 0 | Status: SHIPPED | OUTPATIENT
Start: 2024-06-20

## 2024-06-20 RX ORDER — POTASSIUM CHLORIDE 20 MEQ/1
40 TABLET, EXTENDED RELEASE ORAL
Status: COMPLETED | OUTPATIENT
Start: 2024-06-20 | End: 2024-06-20

## 2024-06-20 RX ORDER — PHENAZOPYRIDINE HYDROCHLORIDE 100 MG/1
100 TABLET, FILM COATED ORAL
Qty: 6 TABLET | Refills: 0 | Status: SHIPPED | OUTPATIENT
Start: 2024-06-20 | End: 2024-06-22

## 2024-06-20 RX ORDER — GENTAMICIN 40 MG/ML
5 INJECTION, SOLUTION INTRAMUSCULAR; INTRAVENOUS ONCE
Status: COMPLETED | OUTPATIENT
Start: 2024-06-20 | End: 2024-06-20

## 2024-06-20 RX ADMIN — GENTAMICIN SULFATE 436 MG: 40 INJECTION, SOLUTION INTRAMUSCULAR; INTRAVENOUS at 04:06

## 2024-06-20 RX ADMIN — PHENAZOPYRIDINE HYDROCHLORIDE 100 MG: 100 TABLET ORAL at 04:06

## 2024-06-20 RX ADMIN — POTASSIUM CHLORIDE 40 MEQ: 1500 TABLET, EXTENDED RELEASE ORAL at 04:06

## 2024-06-20 RX ADMIN — KETOROLAC TROMETHAMINE 15 MG: 30 INJECTION, SOLUTION INTRAMUSCULAR at 04:06

## 2024-06-20 NOTE — ED PROVIDER NOTES
ED PROVIDER NOTE  6/20/2024    CHIEF COMPLAINT:   Chief Complaint   Patient presents with    Abdominal Pain     Bilateral lower abdominal pain; reports increase in pain upon urinating, with slight dysuria.       HISTORY OF PRESENT ILLNESS:   Ran Reyes Jr. is a 68 y.o. male who presents with chief complaint Abdominal pain.  Onset was back in March whenever he began having some lower abdominal pain that he describes as sharp and burning that is aggravated with urination, states the pain has gotten progressively worse.  He reports back in March he was hospitalized due to having an infection in his scrotum and had to undergo surgery come and states that the area of the scrotum has gotten better.  Denies diarrhea, fever, nausea, vomiting.    The history is provided by the patient.         REVIEW OF SYSTEMS: as noted in the HPI.  NURSING NOTES REVIEWED      PAST MEDICAL/SURGICAL HISTORY:   Past Medical History:   Diagnosis Date    A-fib     Anticoagulant long-term use     Aortic aneurysm     Cataract     CHF (congestive heart failure)     Chronic atrial fibrillation     COPD (chronic obstructive pulmonary disease)     Coronary artery disease     HLD (hyperlipidemia)     Hypertension     Mitral regurgitation     PAD (peripheral artery disease)     Primary open angle glaucoma (POAG)       Past Surgical History:   Procedure Laterality Date    ANGIOGRAM, CORONARY, WITH LEFT HEART CATHETERIZATION N/A 3/26/2024    Procedure: Angiogram, Coronary, with Left Heart Cath;  Surgeon: Adriano Montiel MD;  Location: [facility] CATH LAB;  Service: Cardiology;  Laterality: N/A;    ATHERECTOMY OF PERIPHERAL VESSEL Left 09/12/2022    LEFT SFA ATHERECTOMY, BALLOON ANGIOPLASTY    CATARACT EXTRACTION W/  INTRAOCULAR LENS IMPLANT Left 3/9/2023    Procedure: EXTRACTION, CATARACT, WITH IOL INSERTION;  Surgeon: Georgi Borja MD;  Location: [facility];  Service: Ophthalmology;  Laterality: Left;  19.5  mac    EGD, WITH CLOSED BIOPSY  3/22/2024     Procedure: EGD, WITH CLOSED BIOPSY;  Surgeon: Ronald Calderon MD;  Location: [facility] ENDOSCOPY;  Service: Gastroenterology;;    ESOPHAGOGASTRODUODENOSCOPY N/A 3/22/2024    Procedure: EGD;  Surgeon: Ronald Calderon MD;  Location: [facility] ENDOSCOPY;  Service: Gastroenterology;  Laterality: N/A;    Heart Stent N/A     > 10yrs. AGO    INCISION AND DRAINAGE N/A 3/18/2024    Procedure: Incision and Drainage;  Surgeon: Fahad Rivas MD;  Location: [facility];  Service: Urology;  Laterality: N/A;  I&D SCROTAL ABSCESS    INSERTION OF STENT INTO PERIPHERAL VESSEL Right 10/17/2022    RIGHT SFA ATHERECTOMY, BALLOON ANGIOPLASTY, STENT; RIGHT ANTERIOR TIBIAL ARTERY ATHERECTOMY, BALLOON ANGIOPLASTY    ORCHIECTOMY Left 3/18/2024    Procedure: ORCHIECTOMY;  Surgeon: Fahad Rivas MD;  Location: [facility];  Service: Urology;  Laterality: Left;       FAMILY HISTORY:   Family History   Problem Relation Name Age of Onset    Cancer Mother      Hypertension Mother      Heart failure Father      Stroke Father      Hypertension Father      No Known Problems Sister         SOCIAL HISTORY:   Social History     Tobacco Use    Smoking status: Every Day     Current packs/day: 0.25     Average packs/day: 0.3 packs/day for 40.0 years (10.0 ttl pk-yrs)     Types: Cigarettes     Passive exposure: Current    Smokeless tobacco: Never   Substance Use Topics    Alcohol use: Yes     Alcohol/week: 3.0 standard drinks of alcohol     Types: 3 Cans of beer per week     Comment: socially    Drug use: Not Currently     Frequency: 1.0 times per week     Types: Marijuana     Comment: no use in over 2 months       ALLERGIES: Review of patient's allergies indicates:  No Known Allergies    PHYSICAL EXAM:  Initial Vitals [06/20/24 1429]   BP Pulse Resp Temp SpO2   114/72 88 14 98.2 °F (36.8 °C) 100 %      MAP       --         Physical Exam    Nursing note and vitals reviewed.  Constitutional: He appears well-developed and well-nourished.  No distress.   HENT:   Head: Normocephalic and atraumatic.   Nose: Nose normal.   Mouth/Throat: Oropharynx is clear and moist and mucous membranes are normal.   Eyes: Conjunctivae and EOM are normal. Pupils are equal, round, and reactive to light.   Neck: Neck supple. No tracheal deviation present.   Cardiovascular:  Normal rate, regular rhythm, normal heart sounds, intact distal pulses and normal pulses.           Pulmonary/Chest: Effort normal and breath sounds normal. No respiratory distress.   Abdominal: Abdomen is soft. There is no abdominal tenderness. There is no rebound and no guarding.   Genitourinary:    Genitourinary Comments: Well-healed scar to left hemiscrotum.     Musculoskeletal:         General: Normal range of motion.      Cervical back: Neck supple.     Neurological: He is alert and oriented to person, place, and time. GCS eye subscore is 4. GCS verbal subscore is 5. GCS motor subscore is 6.   CN II-XII intact. Moves all extremities. No gross sensory or motor deficits.   Skin: Skin is warm, dry and intact.   Psychiatric: He has a normal mood and affect. His speech is normal and behavior is normal. Judgment and thought content normal. Cognition and memory are normal.         RESULTS:  Labs Reviewed   BASIC METABOLIC PANEL - Abnormal; Notable for the following components:       Result Value    Potassium 2.8 (*)     Glucose 117 (*)     All other components within normal limits   URINALYSIS, REFLEX TO URINE CULTURE - Abnormal; Notable for the following components:    Protein, UA 1+ (*)     Blood, UA Trace (*)     Leukocyte Esterase,  (*)     WBC, UA 51-99 (*)     Bacteria, UA Trace (*)     Squamous Epithelial Cells, UA Trace (*)     Mucous, UA Trace (*)     Hyaline Casts, UA 0-2 (*)     All other components within normal limits   CBC WITH DIFFERENTIAL - Abnormal; Notable for the following components:    RBC 3.75 (*)     Hgb 10.5 (*)     Hct 34.1 (*)     MCHC 30.8 (*)     All other components  within normal limits   CULTURE, URINE   CBC W/ AUTO DIFFERENTIAL    Narrative:     The following orders were created for panel order CBC auto differential.  Procedure                               Abnormality         Status                     ---------                               -----------         ------                     CBC with Differential[[id number]]       Abnormal            Final result                 Please view results for these tests on the individual orders.   EXTRA TUBES    Narrative:     The following orders were created for panel order EXTRA TUBES.  Procedure                               Abnormality         Status                     ---------                               -----------         ------                     Light Blue Top Hold[[id number]]                             In process                 Red Top Hold[[id number]]                                    In process                   Please view results for these tests on the individual orders.   LIGHT BLUE TOP HOLD   RED TOP HOLD     Imaging Results    None         PROCEDURES:  Procedures    ECG:       ED COURSE AND MEDICAL DECISION MAKING:  Medications   potassium chloride SA CR tablet 40 mEq (40 mEq Oral Given 6/20/24 1618)   gentamicin injection 436 mg (436 mg Intramuscular Given 6/20/24 1626)   ketorolac injection 15 mg (15 mg Intramuscular Given 6/20/24 1639)   phenazopyridine tablet 100 mg (100 mg Oral Given 6/20/24 1640)     ED Course as of 06/20/24 1644   Thu Jun 20, 2024   1516 WBC: 6.53 [IB]   1516 Hemoglobin(!): 10.5 [IB]   1516 Platelet Count: 188 [IB]   1534 Potassium(!): 2.8 [IB]   1534 Creatinine: 0.96 [IB]   1606 Leukocyte Esterase, UA(!): 500 [IB]   1606 WBC, UA(!): 51-99 [IB]   1606 Bacteria, UA(!): Trace [IB]   1606 RBC, UA: 0-5 [IB]      ED Course User Index  [IB] David Lynne, DO        Medical Decision Making  This patient presents with abdominal pain of unclear etiology. Their evaluation has not identified a  emergent etiology for the abdominal pain. Specifically, given the very benign exam, normal laboratory studies, and lack of significant risk factors, I have a very low suspicion for appendicitis, ischemic bowel, bowel perforation, or any other life threatening disease. I have discussed with the patient the level of uncertainty with undifferentiated abdominal pain and clearly explained the need to follow-up as noted on the discharge instructions, or return to the Emergency Department immediately if the pain worsens, develops fever, persistent and uncontrollable vomiting, or for any new symptoms or concerns. I discussed with the patient that this presentation today for abdominal pain could represent a significant risk for an acute abdominal process. Although the tests in the ED were essentially normal, there is still a possibility of a process such as appendicitis, diverticulitis, cholecystitis, ulcer, early bowel obstruction, mesenteric ischemia, kidney stone, or even kidney infection which could subsequently cause disability or death. The patient understands that they must return within 24 hours for a recheck or see their physician within 24 hours for re-exam due to the possibility of significant surgical or medical process.  Given wanted done dose of gentamicin for cystitis, as his previous culture showed sensitivity to gentamicin.  We will discharge with potassium supplement and instructed to follow up with his PCP.  Given strict ED return precautions. I have spoken with the patient and/or caregivers. I have explained the patient's condition, diagnoses and treatment plan based on the information available to me at this time. I have answered the patient's and/or caregiver's questions and addressed any concerns. The patient and/or caregivers have as good an understanding of the patient's diagnosis, condition and treatment plan as can be expected at this point. The vital signs have been stable. The patient's condition  is stable and appropriate for discharge from the emergency department.     The patient will pursue further outpatient evaluation with the primary care physician or other designated or consulting physician as outlined in the discharge instructions. The patient and/or caregivers are agreeable to this plan of care and follow-up instructions have been explained in detail. The patient and/or caregivers have received these instructions in written format and have expressed an understanding of the discharge instructions. The patient and/or caregivers are aware that any significant change in condition or worsening of symptoms should prompt an immediate return to this or the closest emergency department or a call to 911.    Amount and/or Complexity of Data Reviewed  Labs: ordered. Decision-making details documented in ED Course.    Risk  OTC drugs.  Prescription drug management.  Decision regarding hospitalization.        CLINICAL IMPRESSION:  1. Cystitis    2. Hypokalemia        DISPOSITION:   ED Disposition Condition    Discharge Stable            ED Prescriptions       Medication Sig Dispense Start Date End Date Auth. Provider    phenazopyridine (PYRIDIUM) 100 MG tablet Take 1 tablet (100 mg total) by mouth 3 (three) times daily with meals. for 2 days 6 tablet 6/20/2024 6/22/2024 David Lynne DO    potassium chloride SA (K-DUR,KLOR-CON) 20 MEQ tablet Take 1 tablet (20 mEq total) by mouth 2 (two) times daily. 30 tablet 6/20/2024 -- David Lynne DO          Follow-up Information       Follow up With Specialties Details Why Contact Info    Abiodun Recinos DO Internal Medicine Schedule an appointment as soon as possible for a visit   [address] [facility] [address]  [phone]      [facility] Emergency Dept Emergency Medicine  If symptoms worsen [facility] [facility] [id number] [phone]               David Lynne DO  06/20/24 0441

## 2024-06-22 LAB — BACTERIA UR CULT: ABNORMAL

## 2024-06-25 ENCOUNTER — HOSPITAL ENCOUNTER (OUTPATIENT)
Dept: RADIOLOGY | Facility: HOSPITAL | Age: 69
Discharge: HOME | End: 2024-06-25
Payer: MEDICARE

## 2024-06-25 DIAGNOSIS — I50.9 CHRONIC CONGESTIVE HEART FAILURE, UNSPECIFIED HEART FAILURE TYPE: ICD-10-CM

## 2024-06-25 DIAGNOSIS — R09.89 CAROTID BRUIT, UNSPECIFIED LATERALITY: ICD-10-CM

## 2024-06-25 PROCEDURE — 93880 EXTRACRANIAL BILAT STUDY: CPT

## 2024-06-26 ENCOUNTER — TELEPHONE (OUTPATIENT)
Dept: SMOKING CESSATION | Facility: CLINIC | Age: 69
End: 2024-06-26
Payer: MEDICARE

## 2024-06-26 NOTE — TELEPHONE ENCOUNTER
Patient had not shown up for his [facility] appointment. Called pt. No answer. Left voice message with contact information.  Patient called back to reschedule appointment for 7/23/24 @ 9 am. He stated he is out of town.

## 2024-07-16 ENCOUNTER — HOSPITAL ENCOUNTER (EMERGENCY)
Facility: HOSPITAL | Age: 69
Discharge: HOME | End: 2024-07-16
Attending: INTERNAL MEDICINE
Payer: MEDICARE

## 2024-07-16 ENCOUNTER — OFFICE VISIT (OUTPATIENT)
Dept: CARDIOLOGY | Facility: CLINIC | Age: 69
End: 2024-07-16
Payer: MEDICARE

## 2024-07-16 VITALS
WEIGHT: 196.94 LBS | BODY MASS INDEX: 26.67 KG/M2 | HEIGHT: 72 IN | OXYGEN SATURATION: 99 % | HEART RATE: 77 BPM | SYSTOLIC BLOOD PRESSURE: 124 MMHG | TEMPERATURE: 98 F | RESPIRATION RATE: 18 BRPM | DIASTOLIC BLOOD PRESSURE: 76 MMHG

## 2024-07-16 VITALS
HEIGHT: 72 IN | BODY MASS INDEX: 23.62 KG/M2 | RESPIRATION RATE: 20 BRPM | DIASTOLIC BLOOD PRESSURE: 76 MMHG | OXYGEN SATURATION: 100 % | WEIGHT: 174.38 LBS | HEART RATE: 74 BPM | SYSTOLIC BLOOD PRESSURE: 112 MMHG | TEMPERATURE: 98 F

## 2024-07-16 DIAGNOSIS — E87.6 HYPOKALEMIA: ICD-10-CM

## 2024-07-16 DIAGNOSIS — I48.20 CHRONIC ATRIAL FIBRILLATION: ICD-10-CM

## 2024-07-16 DIAGNOSIS — R06.02 SOB (SHORTNESS OF BREATH): Primary | ICD-10-CM

## 2024-07-16 DIAGNOSIS — I50.9 CHRONIC CONGESTIVE HEART FAILURE, UNSPECIFIED HEART FAILURE TYPE: ICD-10-CM

## 2024-07-16 DIAGNOSIS — R07.9 CHEST PAIN: ICD-10-CM

## 2024-07-16 DIAGNOSIS — T14.90XA TRAUMA: ICD-10-CM

## 2024-07-16 DIAGNOSIS — I42.8 NICM (NONISCHEMIC CARDIOMYOPATHY): ICD-10-CM

## 2024-07-16 DIAGNOSIS — I50.20 HFREF (HEART FAILURE WITH REDUCED EJECTION FRACTION): Primary | ICD-10-CM

## 2024-07-16 LAB
ALBUMIN SERPL-MCNC: 3.9 G/DL (ref 3.4–4.8)
ALBUMIN/GLOB SERPL: 1 RATIO (ref 1.1–2)
ALP SERPL-CCNC: 148 UNIT/L (ref 40–150)
ALT SERPL-CCNC: 17 UNIT/L (ref 0–55)
ANION GAP SERPL CALC-SCNC: 10 MEQ/L
AST SERPL-CCNC: 29 UNIT/L (ref 5–34)
BASOPHILS # BLD AUTO: 0.02 X10(3)/MCL
BASOPHILS NFR BLD AUTO: 0.4 %
BILIRUB SERPL-MCNC: 1.1 MG/DL
BNP BLD-MCNC: 1219.7 PG/ML
BUN SERPL-MCNC: 21.8 MG/DL (ref 8.4–25.7)
CALCIUM SERPL-MCNC: 9.8 MG/DL (ref 8.8–10)
CHLORIDE SERPL-SCNC: 105 MMOL/L (ref 98–107)
CO2 SERPL-SCNC: 21 MMOL/L (ref 23–31)
CREAT SERPL-MCNC: 1.04 MG/DL (ref 0.73–1.18)
CREAT/UREA NIT SERPL: 21
EOSINOPHIL # BLD AUTO: 0.1 X10(3)/MCL (ref 0–0.9)
EOSINOPHIL NFR BLD AUTO: 1.9 %
ERYTHROCYTE [DISTWIDTH] IN BLOOD BY AUTOMATED COUNT: 15.8 % (ref 11.5–17)
GFR SERPLBLD CREATININE-BSD FMLA CKD-EPI: >60 ML/MIN/1.73/M2
GLOBULIN SER-MCNC: 4 GM/DL (ref 2.4–3.5)
GLUCOSE SERPL-MCNC: 96 MG/DL (ref 82–115)
HCT VFR BLD AUTO: 37.8 % (ref 42–52)
HGB BLD-MCNC: 11.7 G/DL (ref 14–18)
HOLD SPECIMEN: NORMAL
HOLD SPECIMEN: NORMAL
IMM GRANULOCYTES # BLD AUTO: 0.01 X10(3)/MCL (ref 0–0.04)
IMM GRANULOCYTES NFR BLD AUTO: 0.2 %
LYMPHOCYTES # BLD AUTO: 1.7 X10(3)/MCL (ref 0.6–4.6)
LYMPHOCYTES NFR BLD AUTO: 32 %
MCH RBC QN AUTO: 26.7 PG (ref 27–31)
MCHC RBC AUTO-ENTMCNC: 31 G/DL (ref 33–36)
MCV RBC AUTO: 86.1 FL (ref 80–94)
MONOCYTES # BLD AUTO: 0.51 X10(3)/MCL (ref 0.1–1.3)
MONOCYTES NFR BLD AUTO: 9.6 %
NEUTROPHILS # BLD AUTO: 2.97 X10(3)/MCL (ref 2.1–9.2)
NEUTROPHILS NFR BLD AUTO: 55.9 %
NRBC BLD AUTO-RTO: 0 %
PLATELET # BLD AUTO: 168 X10(3)/MCL (ref 130–400)
PMV BLD AUTO: 10.8 FL (ref 7.4–10.4)
POTASSIUM SERPL-SCNC: 3.7 MMOL/L (ref 3.5–5.1)
PROT SERPL-MCNC: 7.9 GM/DL (ref 5.8–7.6)
RBC # BLD AUTO: 4.39 X10(6)/MCL (ref 4.7–6.1)
SODIUM SERPL-SCNC: 136 MMOL/L (ref 136–145)
TROPONIN I SERPL-MCNC: 0.01 NG/ML (ref 0–0.04)
WBC # BLD AUTO: 5.31 X10(3)/MCL (ref 4.5–11.5)

## 2024-07-16 PROCEDURE — 93005 ELECTROCARDIOGRAM TRACING: CPT

## 2024-07-16 PROCEDURE — 80053 COMPREHEN METABOLIC PANEL: CPT | Performed by: INTERNAL MEDICINE

## 2024-07-16 PROCEDURE — 83880 ASSAY OF NATRIURETIC PEPTIDE: CPT | Performed by: INTERNAL MEDICINE

## 2024-07-16 PROCEDURE — 99214 OFFICE O/P EST MOD 30 MIN: CPT | Mod: PBBFAC,25 | Performed by: INTERNAL MEDICINE

## 2024-07-16 PROCEDURE — 85025 COMPLETE CBC W/AUTO DIFF WBC: CPT | Performed by: INTERNAL MEDICINE

## 2024-07-16 PROCEDURE — 84484 ASSAY OF TROPONIN QUANT: CPT | Performed by: INTERNAL MEDICINE

## 2024-07-16 PROCEDURE — 63600175 PHARM REV CODE 636 W HCPCS

## 2024-07-16 PROCEDURE — 99285 EMERGENCY DEPT VISIT HI MDM: CPT | Mod: 25,27

## 2024-07-16 PROCEDURE — 96374 THER/PROPH/DIAG INJ IV PUSH: CPT

## 2024-07-16 RX ORDER — FUROSEMIDE 10 MG/ML
20 INJECTION INTRAMUSCULAR; INTRAVENOUS
Status: DISCONTINUED | OUTPATIENT
Start: 2024-07-16 | End: 2024-07-16

## 2024-07-16 RX ORDER — FUROSEMIDE 10 MG/ML
40 INJECTION INTRAMUSCULAR; INTRAVENOUS
Status: COMPLETED | OUTPATIENT
Start: 2024-07-16 | End: 2024-07-16

## 2024-07-16 RX ORDER — FUROSEMIDE 40 MG/1
40 TABLET ORAL DAILY
Qty: 5 TABLET | Refills: 0 | Status: SHIPPED | OUTPATIENT
Start: 2024-07-16 | End: 2024-07-21

## 2024-07-16 RX ADMIN — FUROSEMIDE 40 MG: 10 INJECTION, SOLUTION INTRAMUSCULAR; INTRAVENOUS at 06:07

## 2024-07-16 NOTE — ED PROVIDER NOTES
"Encounter Date: 7/16/2024       History     Chief Complaint   Patient presents with    Shortness of Breath     Exertional dyspnea x 1 day     Pt is a 67yo male with a PMHX of CHF, COPD,CAD, HTN, HLD, chronic a-fib, PAD who presents to the ED due to SOB. Pt stated it started last night when he was watching television. Also, he experiences some chest tightness across the b/l anterior aspect of his chest with radiation up to the lower neck which is brought on by standing and walking. He has experienced SOB before, but he states "it feels different". Noted some relief with using his home oxygen last night which he is on 3 L.He had an appt with his Cardiologist today and he was referred by them to come into the ED due to symptoms progressively worsening from last night.  Denies diaphoresis, nausea, vomiting.     The history is provided by the patient.     Review of patient's allergies indicates:  No Known Allergies  Past Medical History:   Diagnosis Date    A-fib     Anticoagulant long-term use     Aortic aneurysm     Cataract     CHF (congestive heart failure)     Chronic atrial fibrillation     COPD (chronic obstructive pulmonary disease)     Coronary artery disease     HLD (hyperlipidemia)     Hypertension     Mitral regurgitation     PAD (peripheral artery disease)     Primary open angle glaucoma (POAG)      Past Surgical History:   Procedure Laterality Date    ANGIOGRAM, CORONARY, WITH LEFT HEART CATHETERIZATION N/A 3/26/2024    Procedure: Angiogram, Coronary, with Left Heart Cath;  Surgeon: Adriano Montiel MD;  Location: [facility] CATH LAB;  Service: Cardiology;  Laterality: N/A;    ATHERECTOMY OF PERIPHERAL VESSEL Left 09/12/2022    LEFT SFA ATHERECTOMY, BALLOON ANGIOPLASTY    CATARACT EXTRACTION W/  INTRAOCULAR LENS IMPLANT Left 3/9/2023    Procedure: EXTRACTION, CATARACT, WITH IOL INSERTION;  Surgeon: Georgi Borja MD;  Location: [facility];  Service: Ophthalmology;  Laterality: Left;  19.5  mac    EGD, WITH CLOSED " BIOPSY  3/22/2024    Procedure: EGD, WITH CLOSED BIOPSY;  Surgeon: Ronald Calderon MD;  Location: [facility] ENDOSCOPY;  Service: Gastroenterology;;    ESOPHAGOGASTRODUODENOSCOPY N/A 3/22/2024    Procedure: EGD;  Surgeon: Ronald Calderon MD;  Location: [facility] ENDOSCOPY;  Service: Gastroenterology;  Laterality: N/A;    Heart Stent N/A     > 10yrs. AGO    INCISION AND DRAINAGE N/A 3/18/2024    Procedure: Incision and Drainage;  Surgeon: Fahad Rivas MD;  Location: [facility] OR;  Service: Urology;  Laterality: N/A;  I&D SCROTAL ABSCESS    INSERTION OF STENT INTO PERIPHERAL VESSEL Right 10/17/2022    RIGHT SFA ATHERECTOMY, BALLOON ANGIOPLASTY, STENT; RIGHT ANTERIOR TIBIAL ARTERY ATHERECTOMY, BALLOON ANGIOPLASTY    ORCHIECTOMY Left 3/18/2024    Procedure: ORCHIECTOMY;  Surgeon: Fahad Rivas MD;  Location: [facility];  Service: Urology;  Laterality: Left;     Family History   Problem Relation Name Age of Onset    Cancer Mother      Hypertension Mother      Heart failure Father      Stroke Father      Hypertension Father      No Known Problems Sister       Social History     Tobacco Use    Smoking status: Every Day     Current packs/day: 0.25     Average packs/day: 0.3 packs/day for 40.0 years (10.0 ttl pk-yrs)     Types: Cigarettes     Passive exposure: Current    Smokeless tobacco: Never   Substance Use Topics    Alcohol use: Yes     Alcohol/week: 3.0 standard drinks of alcohol     Types: 3 Cans of beer per week     Comment: socially    Drug use: Not Currently     Frequency: 1.0 times per week     Types: Marijuana     Comment: no use in over 2 months     Review of Systems   Respiratory:  Positive for chest tightness and shortness of breath. Negative for choking, wheezing and stridor.    Cardiovascular:  Negative for leg swelling.   Musculoskeletal:  Negative for joint swelling.       Physical Exam     Initial Vitals [07/16/24 1642]   BP Pulse Resp Temp SpO2   125/77 (!) 55 18 97.9 °F (36.6 °C)  99 %      MAP       --         Physical Exam    Vitals reviewed.  Cardiovascular:      Exam reveals no gallop and no friction rub.       No murmur heard.  Bradycardia and irregular rhythm     Pulmonary/Chest: Breath sounds normal. He has no wheezes. He has no rhonchi. He has no rales. He exhibits no tenderness.           ED Course   Procedures  Labs Reviewed   COMPREHENSIVE METABOLIC PANEL - Abnormal; Notable for the following components:       Result Value    CO2 21 (*)     Protein Total 7.9 (*)     Globulin 4.0 (*)     Albumin/Globulin Ratio 1.0 (*)     All other components within normal limits   B-TYPE NATRIURETIC PEPTIDE - Abnormal; Notable for the following components:    Natriuretic Peptide 1,219.7 (*)     All other components within normal limits   CBC WITH DIFFERENTIAL - Abnormal; Notable for the following components:    RBC 4.39 (*)     Hgb 11.7 (*)     Hct 37.8 (*)     MCH 26.7 (*)     MCHC 31.0 (*)     MPV 10.8 (*)     All other components within normal limits   TROPONIN I - Normal   CBC W/ AUTO DIFFERENTIAL    Narrative:     The following orders were created for panel order CBC auto differential.  Procedure                               Abnormality         Status                     ---------                               -----------         ------                     CBC with Differential[[id number]]       Abnormal            Final result                 Please view results for these tests on the individual orders.   EXTRA TUBES    Narrative:     The following orders were created for panel order EXTRA TUBES.  Procedure                               Abnormality         Status                     ---------                               -----------         ------                     Light Blue Top Hold[[id number]]                             Final result               Red Top Hold[[id number]]                                    Final result                 Please view results for these tests on the  individual orders.   LIGHT BLUE TOP HOLD   RED TOP HOLD        ECG Results              EKG 12-lead (In process)        Collection Time Result Time QRS Duration [facility] QTC Calculation    07/16/24 16:40:00 07/16/24 16:59:15 98 495                     In process by Interface, Lab In [facility] (07/16/24 16:59:23)                   Narrative:    Test Reason : R07.9,    Vent. Rate : 077 BPM     Atrial Rate : 080 BPM     P-R Int : 000 ms          QRS Dur : 098 ms      QT Int : 438 ms       P-R-T Axes : 000 -56 107 degrees     QTc Int : 495 ms    Atrial fibrillation with a competing junctional pacemaker with premature  ventricular or aberrantly conducted complexes  Left axis deviation  ST and T wave abnormality, consider lateral ischemia  Prolonged QT  Abnormal ECG  When compared with ECG of 14-JUN-2024 11:00,  Atrial fibrillation has replaced Sinus rhythm  The axis Shifted left    Referred By:             Confirmed By:                                   Imaging Results              X-Ray Chest AP Portable (Final result)  Result time 07/16/24 17:35:40      Final result by Sandip Bañuelos MD (07/16/24 17:35:40)                   Impression:      Chronic appearing lung changes bilaterally no acute process      Electronically signed by: Jon Bañuelos  Date:    07/16/2024  Time:    17:35               Narrative:    EXAMINATION:  XR CHEST AP PORTABLE    CLINICAL HISTORY:  Chest Pain;    TECHNIQUE:  Single frontal view of the chest was performed.    COMPARISON:  06/14/2024    FINDINGS:  There are chronic appearing lung changes seen bilaterally. No evidence of acute infiltrate is seen. No mass is seen. No lesion is seen. No pleural effusion is seen. The heart is enlarged in size.  The aorta appears normal. The bones and joints show no acute abnormality.                                       Medications   furosemide injection 40 mg (40 mg Intravenous Given 7/16/24 8672)     Medical Decision Making  Amount and/or Complexity of  Data Reviewed  Labs: ordered. Decision-making details documented in ED Course.  Radiology: ordered and independent interpretation performed. Decision-making details documented in ED Course.  ECG/medicine tests: independent interpretation performed. Decision-making details documented in ED Course.    Risk  Prescription drug management.      Additional MDM:   Differential Diagnosis:   Other: The following diagnoses were also considered and will be evaluated: COPD exacerbation, chronic a-fib and stable angina.           Attending Attestation:   Physician Attestation Statement for Resident:  As the supervising MD   Physician Attestation Statement: I have personally seen and examined this patient.   I agree with the above history.  -:   As the supervising MD I agree with the above PE.     As the supervising MD I agree with the above treatment, course, plan, and disposition.    I have reviewed and agree with the residents interpretation of the following: lab data, x-rays and EKG.  I have reviewed the following: old records at this facility.                ED Course as of 07/16/24 1942   Tue Jul 16, 2024 1852 BNP(!): 1,219.7 [RC]   1853 X-Ray Chest AP Portable [RC]      ED Course User Index  [RC] Shameka Walker MD                           Clinical Impression:  Final diagnoses:  [R06.02] SOB (shortness of breath) (Primary)  [R07.9] Chest pain  [I50.9] Chronic congestive heart failure, unspecified heart failure type          ED Disposition Condition    Discharge Stable          ED Prescriptions       Medication Sig Dispense Start Date End Date Auth. Provider    furosemide (LASIX) 40 MG tablet Take 1 tablet (40 mg total) by mouth once daily. for 5 days 5 tablet 7/16/2024 7/21/2024 Shameka Walker MD          Follow-up Information       Follow up With Specialties Details Why Contact Abiodun Barfield DO Internal Medicine  f/u with PCP as well as Cardiologist after ED visit [address] [facility]  [address]  [phone]               Shameka Walker MD  Resident  07/16/24 1943       Obie Wright MD  07/16/24 8731

## 2024-07-16 NOTE — PROGRESS NOTES
[facility]  Cariology Clinic - Follow Up     Cardiology Attending: Dr. Aung Verduzco MD  Date of Visit: 7/16/2024  Chief Complaint    f/u denies chest pain has [place] no questions        Subjective:      Ran Reyes Jr. is a 68 y.o. male with a PMH significant for NICM-HFimpEF (LVEF 50-55%) with functional MR (severe, posteriorly directed), HTN, HLD, longstanding AF, PAD s/p stent to SFA and rt TA, scrotal abscess, cardiac arrest postoperatively occurring on 3/18, and tobacco abuse who presents for follow up.     Patient was recently hospitalized at [facility] secondary to scrotal abscess.  Hospitalization course was complicated by cardiac arrest occurring on 03/18 where patient was defibrillated x3.  Noted that patient was in torsades but there is no rhythm strip displaying this in the patient's chart.  Patient re-presented to [facility] on 04/26 secondary to scrotal bleed.  At this hospitalization, patient was started on IV antibiotics and imaging was obtained of scrotum which was negative for abscess.  Hospitalization course complicated by several episodes of nonsustained V-tach.  Notable hypomagnesemia with repletion during hospitalization.    Previous Visit: Patient reports that he is feeling well overall and has no acute complaints.  He continues to wear home oxygen while sleeping and on exertion.  He reports that he currently uses 2 L during these times.  He reports that it Lasix dose was increased during hospitalization to 80 mg daily.  He reports that this change in dosing has significantly improved his symptoms such as shortness of breath and lower extremity edema.  Does report frequent nocturia secondary to this increasing Lasix dose.     7/16/2024: Patient was unable to undergo AICD placement due to infection in scrotal area. He has also reported increased in O2 to 3L with overall minimal exercise reserve. Patient to be reevaluated today for  reschedule of AICD. Patient states he has had worsening SOB overnight, stating this is not due to COPD. He appears to be out of several medications including Xarelto, Entresto. Lasix at 40mg BID, states he has good U/O. He states he completed his potassium supplementation, last level was 2.8. Currently having SOB at rest intermittently.     Medications:     Current Outpatient Medications   Medication Sig Dispense Refill    aspirin (ECOTRIN) 81 MG EC tablet Take 1 tablet (81 mg total) by mouth once daily. 30 tablet 11    atorvastatin (LIPITOR) 80 MG tablet Take 1 tablet (80 mg total) by mouth once daily. 90 tablet 3    folic acid (FOLVITE) 1 MG tablet Take 1 tablet (1 mg total) by mouth once daily. 30 tablet 11    furosemide (LASIX) 40 MG tablet Take 1 tablet (40 mg total) by mouth 2 (two) times a day. 180 tablet 3    hydrOXYzine HCL (ATARAX) 10 MG Tab Take 50 mg by mouth 2 (two) times daily as needed.      metoprolol succinate (TOPROL-XL) 25 MG 24 hr tablet Take 0.5 tablets (12.5 mg total) by mouth once daily. 45 tablet 3    midodrine (PROAMATINE) 10 MG tablet Take 1 tablet (10 mg total) by mouth 3 (three) times daily with meals. 90 tablet 11    potassium chloride SA (K-DUR,KLOR-CON) 20 MEQ tablet Take 1 tablet (20 mEq total) by mouth 2 (two) times daily. 30 tablet 0    rivaroxaban (XARELTO) 20 mg Tab Take 1 tablet (20 mg total) by mouth Daily. 30 tablet 6    amoxicillin-clavulanate 875-125mg (AUGMENTIN) 875-125 mg per tablet Take 1 tablet by mouth 2 (two) times daily. 14 tablet 0    cetirizine (ZYRTEC) 10 MG tablet Take 1 tablet (10 mg total) by mouth once daily. for 14 days 14 tablet 0    pantoprazole (PROTONIX) 40 MG tablet Take 1 tablet (40 mg total) by mouth 2 (two) times a day. (Patient not taking: Reported on 6/11/2024) 60 tablet 0    potassium chloride SA (K-DUR,KLOR-CON) 20 MEQ tablet Take 1 tablet (20 mEq total) by mouth once daily. (Patient not taking: Reported on 7/16/2024) 4 tablet 0    spironolactone  (ALDACTONE) 50 MG tablet Take 1 tablet (50 mg total) by mouth once daily. 90 tablet 3    triamcinolone acetonide 0.1% (KENALOG) 0.1 % cream Apply topically Daily. (Patient not taking: Reported on 6/11/2024)      varenicline (CHANTIX STARTING MONTH BOX) 0.5 mg (11)- 1 mg (42) tablet Take one 0.5mg tab by mouth once daily X3 days,then increase to one 0.5mg tab twice daily X4 days,then increase to one 1mg tab twice daily (Patient not taking: Reported on 5/15/2024) 1 each 2     Current Facility-Administered Medications   Medication Dose Route Frequency Provider Last Rate Last Admin    diazePAM tablet 5 mg  5 mg Oral Once Dayanna Johnson MD        sodium chloride 0.9% flush 10 mL  10 mL Intravenous Q6H Dayanna Johnson MD         Facility-Administered Medications Ordered in Other Visits   Medication Dose Route Frequency Provider Last Rate Last Admin    0.9%  NaCl infusion   Intravenous Continuous Shannon Milligan MD 10 mL/hr at 03/09/23 1020 New Bag at 03/09/23 1020    LIDOcaine (PF) 10 mg/ml (1%) injection 10 mg  1 mL Intradermal Once Lanette Ulloa FNP        LIDOcaine (PF) 10 mg/ml (1%) injection 10 mg  1 mL Intradermal Once Shannon Milligan MD        ondansetron injection 4 mg  4 mg Intravenous Once Shannon Milligan MD        prochlorperazine injection Soln 5 mg  5 mg Intravenous Once PRN Shannon Milligan MD           I have reviewed and updated the patient's medications, allergies, past medical history, surgical history, social history and family history as needed.    Review of Systems:     Review of Systems   Constitutional:  Negative for chills, diaphoresis, fever and malaise/fatigue.   Respiratory:  Positive for shortness of breath. Negative for cough, hemoptysis, sputum production and wheezing.    Cardiovascular:  Positive for chest pain and orthopnea. Negative for leg swelling.   Gastrointestinal:  Negative for abdominal pain, constipation, diarrhea, nausea and vomiting.   Genitourinary:   Negative for dysuria, flank pain, hematuria and urgency.     14 point ROS is negative unless as stated above in HPI  Objective:     Wt Readings from Last 3 Encounters:   07/16/24 79.1 kg (174 lb 6.4 oz)   06/20/24 87.2 kg (192 lb 3.9 oz)   06/17/24 89.1 kg (196 lb 8 oz)     Temp Readings from Last 3 Encounters:   07/16/24 98.1 °F (36.7 °C) (Oral)   06/20/24 98.2 °F (36.8 °C) (Oral)   06/17/24 98.2 °F (36.8 °C) (Oral)     BP Readings from Last 3 Encounters:   07/16/24 112/76   06/20/24 106/68   06/17/24 131/78     Pulse Readings from Last 3 Encounters:   07/16/24 74   06/20/24 65   06/17/24 72       Vitals:    07/16/24 1537   BP: 112/76   BP Location: Left arm   Patient Position: Sitting   BP Method: Medium (Automatic)   Pulse: 74   Resp: 20   Temp: 98.1 °F (36.7 °C)   TempSrc: Oral   SpO2: 100%   Weight: 79.1 kg (174 lb 6.4 oz)   Height: 6' (1.829 m)       Body mass index is 23.65 kg/m².    Physical Exam  Vitals reviewed.   Constitutional:       Comments: Appears to be having conversational dyspnea   Eyes:      Pupils: Pupils are equal, round, and reactive to light.   Cardiovascular:      Rate and Rhythm: Normal rate. Rhythm irregular.      Pulses: Normal pulses.      Heart sounds: Murmur heard.      No friction rub. No gallop.      Comments: JVP at 5cm  Pulmonary:      Effort: Respiratory distress present.      Breath sounds: Normal breath sounds. No wheezing, rhonchi or rales.   Abdominal:      General: There is no distension.      Palpations: Abdomen is soft.      Tenderness: There is no abdominal tenderness.   Musculoskeletal:         General: No swelling or tenderness.   Skin:     Comments: Somewhat cool extremities   Neurological:      General: No focal deficit present.      Mental Status: He is alert. Mental status is at baseline.        Labs:   I have reviewed the following labs below:      Lab Results   Component Value Date    WBC 6.53 06/20/2024    HGB 10.5 (L) 06/20/2024    HCT 34.1 (L) 06/20/2024    PLT  188 06/20/2024    MCV 90.9 06/20/2024    RDW 15.7 06/20/2024     Lab Results   Component Value Date     06/20/2024    K 2.8 (L) 06/20/2024     06/20/2024    CO2 23 06/20/2024    BUN 10.4 06/20/2024    CALCIUM 9.2 06/20/2024    MG 1.70 06/14/2024    PHOS 2.7 04/28/2024     Lab Results   Component Value Date    ALBUMIN 3.6 06/14/2024    BILITOT 1.3 06/14/2024    AST 28 06/14/2024    ALKPHOS 124 06/14/2024    ALT 11 06/14/2024     Cholesterol Total   Date Value Ref Range Status   03/18/2024 46 <=200 mg/dL Final     HDL Cholesterol   Date Value Ref Range Status   03/18/2024 <5 (L) 35 - 60 mg/dL Final     LDL Cholesterol   Date Value Ref Range Status   11/16/2021 49.00 (L) 50.00 - 140.00 mg/dL Final     Triglyceride   Date Value Ref Range Status   04/12/2024 46 34 - 140 mg/dL Final     Lab Results   Component Value Date    HGBA1C 5.0 03/18/2024    HGBA1C 4.7 11/15/2021    HGBA1C 5.7 10/07/2020    CREATININE 0.96 06/20/2024     Lab Results   Component Value Date    TSH 1.097 03/18/2024     Lab Results   Component Value Date    IRON 27 (L) 03/18/2024    TIBC 151 (L) 03/18/2024    FERRITIN 1,101.88 (H) 03/18/2024    [id number] 1,078 (H) 03/18/2024    FOLATE 10.0 03/18/2024     Lab Results   Component Value Date    INR 2.7 (H) 06/11/2024    APTT 48.8 (H) 06/11/2024      Lab Results   Component Value Date    TROPONINI 0.011 06/14/2024    TROPONINI <0.010 04/19/2024    TROPONINI <0.010 04/09/2024    TROPONINI 0.028 03/29/2024    TROPONINI 0.040 03/20/2024    .0 (H) 06/14/2024    .7 (H) 04/26/2024    .5 (H) 04/19/2024     Cardiovascular Studies:     ADRIENNE on 01/2022   Summary  Severe posteriorly directed mitral regurgitation (ERO 0.5cm2, regurgitant volume 84mL). Mitral regurgitation is type 1a (normal leaflet  motion).  Overall left ventricular systolic function is moderately reduced, LVEF 41%.  Left ventricular cavity is severely enlarged.  Severe left ventricular hypertrophy.  Global  hypokinesis noted.  Right ventricular cavity is moderately enlarged. Right ventricular systolic function is moderately reduced.  Atrial septum is intact with no evidence of shunt. Contrast study with agitated saline is mildly positive consistent with intracardiac  shunt at atrial level.  No thrombus or spontaneous contrast noted within the left atrium or left atrial appendage.  Mild-to-moderate tricuspid regurgitation.  No aortic stenosis. No aortic regurgitation present. Aortic valve is tricuspid.  Normal pulmonic valve structure and function.  No evidence of pericardial effusion.    TTE 11/6/21  Mitral Valve  Structurally normal mitral valve.  Moderate to severe central mitral regurgitation.  Aortic Valve  Structurally trileaflet aortic valve.  Tricuspid Valve  Mild tricuspid regurgitation  Pulmonary arterial systolic pressure (PASP) is estimated to be moderately elevated (46-65 mmHg).  Pulmonic Valve  No evidence of any pulmonic regurgitation.  Left Atrium  Moderate to severely dilated left atrium.  Left Ventricle  Left ventricular ejection fraction is measured at approximately 25-30%.  Right Atrium  Moderate to severely dilated right atrium.  Right Ventricle  Right ventricular cavity is mildly enlarged.  Pericardial Effusion  No evidence of a pericardial effusion.  Pleural Effusion  No evidence of pleural effusion.  Miscellaneous  The IVC is dilated .  IVC respiratory change in dimension < 50% .    Assessment/Plan:     S/P cardiac arrest during recent hospitalization with defibrillation x3    - noted to be in torsades in cardiology note but rhythm strip for this time is not available for review  - mild hypokalemia noted at the time with no other significant electrolyte abnormalities  - EKG after event displayed questionable prolonged QT  - due to patient's persistently low EF (approximally 40%) despite GDMT and events occurring during recent hospitalization, plan for AICD placement. This was cancelled due to  infection this past June.  - He was also found to be hypokalemic at 2.8     Primary hypertension  -/76 today in clinic  -continue Entresto 49-51 BID, Aldactone 25 QD     Chronic Atrial Fibrillation  -Continue Xarelto 20 mg daily. CHADSVASc 4.   -currently rate controlled, no pharmacological therapy at this time     Tobacco user  -again cessation recommend.      NICM/HFrEF  -most recent EF interpreted as 50-55%; however, on reviewing clinic today EF more congruent with 40%  -Continue entresto to 49-51 BID today (currently not taking)  -Continue Aldactone 25 mg daily.   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 Polanco and followed in structural clinic, no plans for intervention at this time given.  Continue to follow.     PAD (peripheral arterial disease) s/p PCI of peripheral arteries  -Plavix recently discontinued by Dr. Wagner; continue to follow with the  -Continue home simvastatin 40mg     Dyslipidemia  -Continue statin therapy.     Patient with conversational dyspnea at rest, question of hypokalemia remains unresolved (last value 2.8), stating he has weakness on ambulation and chest tightness. Given history of SCA will refer to ED for further evaluation.    This was discussed with Dr. Verduzco who is in agreement with the plan.      Joe Clarke MD  [facility] INTERNAL MEDICINE PGY-3  07/16/2024 4:03 PM  [facility]

## 2024-07-17 LAB
OHS QRS DURATION: 98 MS
OHS QTC CALCULATION: 495 MS

## 2024-07-17 NOTE — PROGRESS NOTES
Cardiology Attending    I evaluated Ran Reyes Jr. and discussed the patient's symptoms, findings, and management plan with the resident.  Please see the Cardiology note for details.

## 2024-07-23 ENCOUNTER — TELEPHONE (OUTPATIENT)
Dept: SMOKING CESSATION | Facility: CLINIC | Age: 69
End: 2024-07-23
Payer: MEDICARE

## 2024-08-08 ENCOUNTER — OFFICE VISIT (OUTPATIENT)
Dept: INTERNAL MEDICINE | Facility: CLINIC | Age: 69
End: 2024-08-08
Payer: MEDICARE

## 2024-08-08 VITALS
WEIGHT: 202.81 LBS | TEMPERATURE: 98 F | RESPIRATION RATE: 19 BRPM | OXYGEN SATURATION: 100 % | SYSTOLIC BLOOD PRESSURE: 115 MMHG | BODY MASS INDEX: 27.47 KG/M2 | HEART RATE: 83 BPM | HEIGHT: 72 IN | DIASTOLIC BLOOD PRESSURE: 83 MMHG

## 2024-08-08 DIAGNOSIS — S51.001A OPEN WOUND OF RIGHT ELBOW, INITIAL ENCOUNTER: ICD-10-CM

## 2024-08-08 DIAGNOSIS — I25.10 ARTERIOSCLEROSIS OF CORONARY ARTERY: ICD-10-CM

## 2024-08-08 DIAGNOSIS — F17.210 HEAVY CIGARETTE SMOKER: ICD-10-CM

## 2024-08-08 DIAGNOSIS — I50.9 CHRONIC CONGESTIVE HEART FAILURE, UNSPECIFIED HEART FAILURE TYPE: ICD-10-CM

## 2024-08-08 DIAGNOSIS — I10 PRIMARY HYPERTENSION: Primary | ICD-10-CM

## 2024-08-08 DIAGNOSIS — Z72.0 TOBACCO USE: ICD-10-CM

## 2024-08-08 PROCEDURE — 99215 OFFICE O/P EST HI 40 MIN: CPT | Mod: PBBFAC

## 2024-08-08 RX ORDER — CETIRIZINE HYDROCHLORIDE 10 MG/1
10 TABLET ORAL DAILY
Qty: 90 TABLET | Refills: 3 | Status: SHIPPED | OUTPATIENT
Start: 2024-08-08 | End: 2025-08-03

## 2024-08-08 RX ORDER — ATORVASTATIN CALCIUM 80 MG/1
80 TABLET, FILM COATED ORAL DAILY
Qty: 90 TABLET | Refills: 3 | Status: SHIPPED | OUTPATIENT
Start: 2024-08-08 | End: 2025-08-08

## 2024-08-08 RX ORDER — METOPROLOL SUCCINATE 25 MG/1
12.5 TABLET, EXTENDED RELEASE ORAL DAILY
Qty: 45 TABLET | Refills: 3 | Status: SHIPPED | OUTPATIENT
Start: 2024-08-08 | End: 2025-08-08

## 2024-08-08 RX ORDER — VARENICLINE TARTRATE 0.5 (11)-1
KIT ORAL
Qty: 1 EACH | Refills: 2 | Status: SHIPPED | OUTPATIENT
Start: 2024-08-08

## 2024-08-08 RX ORDER — PANTOPRAZOLE SODIUM 40 MG/1
40 TABLET, DELAYED RELEASE ORAL 2 TIMES DAILY
Qty: 60 TABLET | Refills: 0 | Status: SHIPPED | OUTPATIENT
Start: 2024-08-08 | End: 2024-09-07

## 2024-08-13 ENCOUNTER — HOSPITAL ENCOUNTER (INPATIENT)
Facility: HOSPITAL | Age: 69
LOS: 4 days | Discharge: HOME | DRG: 291 | End: 2024-08-18
Attending: EMERGENCY MEDICINE | Admitting: INTERNAL MEDICINE
Payer: MEDICARE

## 2024-08-13 DIAGNOSIS — J44.9 CHRONIC OBSTRUCTIVE PULMONARY DISEASE, UNSPECIFIED COPD TYPE: ICD-10-CM

## 2024-08-13 DIAGNOSIS — R06.02 SOB (SHORTNESS OF BREATH): ICD-10-CM

## 2024-08-13 DIAGNOSIS — Z87.09 HISTORY OF COPD: ICD-10-CM

## 2024-08-13 DIAGNOSIS — R07.89 CHEST TIGHTNESS: ICD-10-CM

## 2024-08-13 DIAGNOSIS — I50.9 ACUTE HEART FAILURE, UNSPECIFIED HEART FAILURE TYPE: Primary | ICD-10-CM

## 2024-08-13 DIAGNOSIS — I50.20 HFREF (HEART FAILURE WITH REDUCED EJECTION FRACTION): ICD-10-CM

## 2024-08-13 DIAGNOSIS — Z86.79 HISTORY OF ATRIAL FIBRILLATION: ICD-10-CM

## 2024-08-13 DIAGNOSIS — I21.4 NSTEMI (NON-ST ELEVATED MYOCARDIAL INFARCTION): ICD-10-CM

## 2024-08-13 DIAGNOSIS — I50.9 CHRONIC CONGESTIVE HEART FAILURE, UNSPECIFIED HEART FAILURE TYPE: ICD-10-CM

## 2024-08-13 DIAGNOSIS — R07.9 CHEST PAIN: ICD-10-CM

## 2024-08-13 LAB
ALBUMIN SERPL-MCNC: 3.5 G/DL (ref 3.4–4.8)
ALBUMIN/GLOB SERPL: 1 RATIO (ref 1.1–2)
ALP SERPL-CCNC: 151 UNIT/L (ref 40–150)
ALT SERPL-CCNC: 53 UNIT/L (ref 0–55)
AMPHET UR QL SCN: NEGATIVE
ANION GAP SERPL CALC-SCNC: 15 MEQ/L
ANION GAP SERPL CALC-SCNC: 16 MEQ/L
AST SERPL-CCNC: 81 UNIT/L (ref 5–34)
BARBITURATE SCN PRESENT UR: NEGATIVE
BASOPHILS # BLD AUTO: 0.03 X10(3)/MCL
BASOPHILS NFR BLD AUTO: 0.5 %
BENZODIAZ UR QL SCN: NEGATIVE
BILIRUB SERPL-MCNC: 2.3 MG/DL
BNP BLD-MCNC: 2531.8 PG/ML
BUN SERPL-MCNC: 15.2 MG/DL (ref 8.4–25.7)
BUN SERPL-MCNC: 16.2 MG/DL (ref 8.4–25.7)
CALCIUM SERPL-MCNC: 9.2 MG/DL (ref 8.8–10)
CALCIUM SERPL-MCNC: 9.6 MG/DL (ref 8.8–10)
CANNABINOIDS UR QL SCN: NEGATIVE
CHLORIDE SERPL-SCNC: 101 MMOL/L (ref 98–107)
CHLORIDE SERPL-SCNC: 102 MMOL/L (ref 98–107)
CO2 SERPL-SCNC: 24 MMOL/L (ref 23–31)
CO2 SERPL-SCNC: 24 MMOL/L (ref 23–31)
COCAINE UR QL SCN: NEGATIVE
CREAT SERPL-MCNC: 1.08 MG/DL (ref 0.73–1.18)
CREAT SERPL-MCNC: 1.23 MG/DL (ref 0.73–1.18)
CREAT/UREA NIT SERPL: 13
CREAT/UREA NIT SERPL: 14
EOSINOPHIL # BLD AUTO: 0.03 X10(3)/MCL (ref 0–0.9)
EOSINOPHIL NFR BLD AUTO: 0.5 %
ERYTHROCYTE [DISTWIDTH] IN BLOOD BY AUTOMATED COUNT: 17.8 % (ref 11.5–17)
ETHANOL SERPL-MCNC: <10 MG/DL
FENTANYL UR QL SCN: NEGATIVE
GFR SERPLBLD CREATININE-BSD FMLA CKD-EPI: >60 ML/MIN/1.73/M2
GFR SERPLBLD CREATININE-BSD FMLA CKD-EPI: >60 ML/MIN/1.73/M2
GLOBULIN SER-MCNC: 3.5 GM/DL (ref 2.4–3.5)
GLUCOSE SERPL-MCNC: 130 MG/DL (ref 82–115)
GLUCOSE SERPL-MCNC: 91 MG/DL (ref 82–115)
HCT VFR BLD AUTO: 32.6 % (ref 42–52)
HGB BLD-MCNC: 10.5 G/DL (ref 14–18)
HOLD SPECIMEN: NORMAL
IMM GRANULOCYTES # BLD AUTO: 0.01 X10(3)/MCL (ref 0–0.04)
IMM GRANULOCYTES NFR BLD AUTO: 0.2 %
LYMPHOCYTES # BLD AUTO: 1.49 X10(3)/MCL (ref 0.6–4.6)
LYMPHOCYTES NFR BLD AUTO: 23.9 %
MAGNESIUM SERPL-MCNC: 1.9 MG/DL (ref 1.6–2.6)
MCH RBC QN AUTO: 26.2 PG (ref 27–31)
MCHC RBC AUTO-ENTMCNC: 32.2 G/DL (ref 33–36)
MCV RBC AUTO: 81.3 FL (ref 80–94)
MDMA UR QL SCN: NEGATIVE
MONOCYTES # BLD AUTO: 0.74 X10(3)/MCL (ref 0.1–1.3)
MONOCYTES NFR BLD AUTO: 11.9 %
NEUTROPHILS # BLD AUTO: 3.94 X10(3)/MCL (ref 2.1–9.2)
NEUTROPHILS NFR BLD AUTO: 63 %
NRBC BLD AUTO-RTO: 0.5 %
OPIATES UR QL SCN: NEGATIVE
PCP UR QL: NEGATIVE
PH UR: 5.5 [PH] (ref 3–11)
PLATELET # BLD AUTO: 207 X10(3)/MCL (ref 130–400)
PMV BLD AUTO: 9.7 FL (ref 7.4–10.4)
POCT GLUCOSE: 101 MG/DL (ref 70–110)
POTASSIUM SERPL-SCNC: 2.7 MMOL/L (ref 3.5–5.1)
POTASSIUM SERPL-SCNC: 2.8 MMOL/L (ref 3.5–5.1)
PROT SERPL-MCNC: 7 GM/DL (ref 5.8–7.6)
RBC # BLD AUTO: 4.01 X10(6)/MCL (ref 4.7–6.1)
SODIUM SERPL-SCNC: 141 MMOL/L (ref 136–145)
SODIUM SERPL-SCNC: 141 MMOL/L (ref 136–145)
SPECIFIC GRAVITY, URINE AUTO (.000) (OHS): 1.01 (ref 1–1.03)
TROPONIN I SERPL-MCNC: 0.03 NG/ML (ref 0–0.04)
TROPONIN I SERPL-MCNC: 0.03 NG/ML (ref 0–0.04)
WBC # BLD AUTO: 6.24 X10(3)/MCL (ref 4.5–11.5)

## 2024-08-13 PROCEDURE — 93005 ELECTROCARDIOGRAM TRACING: CPT

## 2024-08-13 PROCEDURE — 25000003 PHARM REV CODE 250

## 2024-08-13 PROCEDURE — 84484 ASSAY OF TROPONIN QUANT: CPT

## 2024-08-13 PROCEDURE — 82077 ASSAY SPEC XCP UR&BREATH IA: CPT

## 2024-08-13 PROCEDURE — 96374 THER/PROPH/DIAG INJ IV PUSH: CPT

## 2024-08-13 PROCEDURE — G0378 HOSPITAL OBSERVATION PER HR: HCPCS

## 2024-08-13 PROCEDURE — 85025 COMPLETE CBC W/AUTO DIFF WBC: CPT

## 2024-08-13 PROCEDURE — 99285 EMERGENCY DEPT VISIT HI MDM: CPT | Mod: 25

## 2024-08-13 PROCEDURE — 94640 AIRWAY INHALATION TREATMENT: CPT

## 2024-08-13 PROCEDURE — 83735 ASSAY OF MAGNESIUM: CPT | Performed by: PHYSICIAN ASSISTANT

## 2024-08-13 PROCEDURE — 80307 DRUG TEST PRSMV CHEM ANLYZR: CPT

## 2024-08-13 PROCEDURE — 25000242 PHARM REV CODE 250 ALT 637 W/ HCPCS

## 2024-08-13 PROCEDURE — 63600175 PHARM REV CODE 636 W HCPCS

## 2024-08-13 PROCEDURE — 83880 ASSAY OF NATRIURETIC PEPTIDE: CPT

## 2024-08-13 PROCEDURE — 80053 COMPREHEN METABOLIC PANEL: CPT

## 2024-08-13 RX ORDER — IBUPROFEN 200 MG
16 TABLET ORAL
Status: DISCONTINUED | OUTPATIENT
Start: 2024-08-13 | End: 2024-08-17

## 2024-08-13 RX ORDER — ACETAMINOPHEN 325 MG/1
650 TABLET ORAL EVERY 6 HOURS PRN
Status: DISCONTINUED | OUTPATIENT
Start: 2024-08-13 | End: 2024-08-18 | Stop reason: HOSPADM

## 2024-08-13 RX ORDER — IBUPROFEN 200 MG
24 TABLET ORAL
Status: DISCONTINUED | OUTPATIENT
Start: 2024-08-13 | End: 2024-08-17

## 2024-08-13 RX ORDER — POTASSIUM CHLORIDE 20 MEQ/1
40 TABLET, EXTENDED RELEASE ORAL ONCE
Status: COMPLETED | OUTPATIENT
Start: 2024-08-14 | End: 2024-08-13

## 2024-08-13 RX ORDER — FOLIC ACID 1 MG/1
1 TABLET ORAL DAILY
Status: DISCONTINUED | OUTPATIENT
Start: 2024-08-14 | End: 2024-08-18 | Stop reason: HOSPADM

## 2024-08-13 RX ORDER — ASPIRIN 81 MG/1
81 TABLET ORAL DAILY
Status: DISCONTINUED | OUTPATIENT
Start: 2024-08-14 | End: 2024-08-18 | Stop reason: HOSPADM

## 2024-08-13 RX ORDER — TALC
6 POWDER (GRAM) TOPICAL NIGHTLY PRN
Status: DISCONTINUED | OUTPATIENT
Start: 2024-08-13 | End: 2024-08-18 | Stop reason: HOSPADM

## 2024-08-13 RX ORDER — CETIRIZINE HYDROCHLORIDE 10 MG/1
10 TABLET ORAL DAILY
Status: DISCONTINUED | OUTPATIENT
Start: 2024-08-14 | End: 2024-08-15

## 2024-08-13 RX ORDER — POTASSIUM CHLORIDE 20 MEQ/1
40 TABLET, EXTENDED RELEASE ORAL
Status: COMPLETED | OUTPATIENT
Start: 2024-08-13 | End: 2024-08-13

## 2024-08-13 RX ORDER — SPIRONOLACTONE 25 MG/1
50 TABLET ORAL DAILY
Status: DISCONTINUED | OUTPATIENT
Start: 2024-08-14 | End: 2024-08-15

## 2024-08-13 RX ORDER — PANTOPRAZOLE SODIUM 40 MG/1
40 TABLET, DELAYED RELEASE ORAL 2 TIMES DAILY
Status: DISCONTINUED | OUTPATIENT
Start: 2024-08-14 | End: 2024-08-18 | Stop reason: HOSPADM

## 2024-08-13 RX ORDER — SODIUM CHLORIDE 0.9 % (FLUSH) 0.9 %
10 SYRINGE (ML) INJECTION EVERY 12 HOURS PRN
Status: DISCONTINUED | OUTPATIENT
Start: 2024-08-13 | End: 2024-08-18 | Stop reason: HOSPADM

## 2024-08-13 RX ORDER — IPRATROPIUM BROMIDE AND ALBUTEROL SULFATE 2.5; .5 MG/3ML; MG/3ML
3 SOLUTION RESPIRATORY (INHALATION)
Status: COMPLETED | OUTPATIENT
Start: 2024-08-13 | End: 2024-08-13

## 2024-08-13 RX ORDER — POTASSIUM CHLORIDE 20 MEQ/1
40 TABLET, EXTENDED RELEASE ORAL ONCE
Status: COMPLETED | OUTPATIENT
Start: 2024-08-13 | End: 2024-08-13

## 2024-08-13 RX ORDER — IBUPROFEN 200 MG
1 TABLET ORAL DAILY PRN
Status: DISCONTINUED | OUTPATIENT
Start: 2024-08-13 | End: 2024-08-18 | Stop reason: HOSPADM

## 2024-08-13 RX ORDER — NALOXONE HCL 0.4 MG/ML
0.02 VIAL (ML) INJECTION
Status: DISCONTINUED | OUTPATIENT
Start: 2024-08-13 | End: 2024-08-18 | Stop reason: HOSPADM

## 2024-08-13 RX ORDER — MIDODRINE HYDROCHLORIDE 5 MG/1
10 TABLET ORAL
Status: DISCONTINUED | OUTPATIENT
Start: 2024-08-14 | End: 2024-08-14

## 2024-08-13 RX ORDER — IPRATROPIUM BROMIDE AND ALBUTEROL SULFATE 2.5; .5 MG/3ML; MG/3ML
3 SOLUTION RESPIRATORY (INHALATION) EVERY 4 HOURS PRN
Status: DISCONTINUED | OUTPATIENT
Start: 2024-08-13 | End: 2024-08-14

## 2024-08-13 RX ORDER — GLUCAGON 1 MG
1 KIT INJECTION
Status: DISCONTINUED | OUTPATIENT
Start: 2024-08-13 | End: 2024-08-17

## 2024-08-13 RX ORDER — ATORVASTATIN CALCIUM 40 MG/1
80 TABLET, FILM COATED ORAL DAILY
Status: DISCONTINUED | OUTPATIENT
Start: 2024-08-14 | End: 2024-08-18 | Stop reason: HOSPADM

## 2024-08-13 RX ORDER — FUROSEMIDE 10 MG/ML
40 INJECTION INTRAMUSCULAR; INTRAVENOUS ONCE
Status: COMPLETED | OUTPATIENT
Start: 2024-08-13 | End: 2024-08-13

## 2024-08-13 RX ORDER — FUROSEMIDE 10 MG/ML
20 INJECTION INTRAMUSCULAR; INTRAVENOUS ONCE
Status: COMPLETED | OUTPATIENT
Start: 2024-08-13 | End: 2024-08-13

## 2024-08-13 RX ADMIN — POTASSIUM CHLORIDE 40 MEQ: 1500 TABLET, EXTENDED RELEASE ORAL at 01:08

## 2024-08-13 RX ADMIN — FUROSEMIDE 20 MG: 10 INJECTION, SOLUTION INTRAMUSCULAR; INTRAVENOUS at 09:08

## 2024-08-13 RX ADMIN — POTASSIUM CHLORIDE 40 MEQ: 1500 TABLET, EXTENDED RELEASE ORAL at 06:08

## 2024-08-13 RX ADMIN — FUROSEMIDE 40 MG: 10 INJECTION, SOLUTION INTRAMUSCULAR; INTRAVENOUS at 02:08

## 2024-08-13 RX ADMIN — POTASSIUM CHLORIDE 40 MEQ: 1500 TABLET, EXTENDED RELEASE ORAL at 11:08

## 2024-08-13 RX ADMIN — RIVAROXABAN 20 MG: 10 TABLET, FILM COATED ORAL at 06:08

## 2024-08-13 RX ADMIN — IPRATROPIUM BROMIDE AND ALBUTEROL SULFATE 3 ML: .5; 3 SOLUTION RESPIRATORY (INHALATION) at 02:08

## 2024-08-13 NOTE — H&P
[facility] Medicine Wards History and Physical Note      Resident Team: [facility] Medicine List 2  Attending Physician: May Angulo MD  Resident: Jennifer Enamorado DO, Sana James MD  Intern: Diana Hassan MD     Date of Admit: 8/13/2024  Chief Complaint   Cough, shortness of breath and abdominal swelling     HPI   Ran Reyes Jr. is a 68 y.o. male with PMH significant for atrial fibrillation on Xarelto, CHF, COPD on 2L home oxygen, CAD, HTN, mitral regurgitation, PAD who presented to [facility] ED on 8/13/2024 with primary complaints of cough, shortness of breath, and abdominal swelling x 2 days. States that he has been unable to lie flat due to increased shortness of breath and difficulty breathing. He has needed to use his home oxygen more frequently the past two days. He feels like his abdomen has been more swollen which has caused some discomfort and nausea. Reports one episode of vomiting this morning. Also having epigastric burning with deep inspiration. Has also had increased lower extremity swelling. States that he has been adherent to his daily medications.     In the ED, vitals upon arrival were T 98, GIOVANNI 133/86, , RR 18, SpO2 99% on RA. Labs significant for K 2.7, creatinine 1.23, , AST 81, BNP 2531.8. Troponin negative. CXR performed and showed no acute cardiopulmonary process. EKG showed atrial fibrillation with PVCs, rate 86 bpm. ST and T wave abnormality, unchanged from previous. He was given duoneb x1 in the ED with subjective improvement in shortness of breath. He was also given Lasix 40 mg IV and Kcl 40 mEq. Medicine was consulted for further management.      History    PMH:  Past Medical History:   Diagnosis Date    A-fib     Anticoagulant long-term use     Aortic aneurysm     Cataract     CHF (congestive heart failure)     Chronic atrial fibrillation     COPD (chronic obstructive pulmonary disease)     Coronary artery disease     HLD (hyperlipidemia)     Hypertension     Mitral  regurgitation     PAD (peripheral artery disease)     Primary open angle glaucoma (POAG)      Past Surgical History:   Past Surgical History:   Procedure Laterality Date    ANGIOGRAM, CORONARY, WITH LEFT HEART CATHETERIZATION N/A 3/26/2024    Procedure: Angiogram, Coronary, with Left Heart Cath;  Surgeon: Adriano Montiel MD;  Location: [facility] CATH LAB;  Service: Cardiology;  Laterality: N/A;    ATHERECTOMY OF PERIPHERAL VESSEL Left 09/12/2022    LEFT SFA ATHERECTOMY, BALLOON ANGIOPLASTY    CATARACT EXTRACTION W/  INTRAOCULAR LENS IMPLANT Left 3/9/2023    Procedure: EXTRACTION, CATARACT, WITH IOL INSERTION;  Surgeon: Georgi Borja MD;  Location: [facility] OR;  Service: Ophthalmology;  Laterality: Left;  19.5  mac    EGD, WITH CLOSED BIOPSY  3/22/2024    Procedure: EGD, WITH CLOSED BIOPSY;  Surgeon: Ronald Calderon MD;  Location: [facility] ENDOSCOPY;  Service: Gastroenterology;;    ESOPHAGOGASTRODUODENOSCOPY N/A 3/22/2024    Procedure: EGD;  Surgeon: Ronald Calderon MD;  Location: [facility] ENDOSCOPY;  Service: Gastroenterology;  Laterality: N/A;    Heart Stent N/A     > 10yrs. AGO    INCISION AND DRAINAGE N/A 3/18/2024    Procedure: Incision and Drainage;  Surgeon: Fahad Rivas MD;  Location: [facility];  Service: Urology;  Laterality: N/A;  I&D SCROTAL ABSCESS    INSERTION OF STENT INTO PERIPHERAL VESSEL Right 10/17/2022    RIGHT SFA ATHERECTOMY, BALLOON ANGIOPLASTY, STENT; RIGHT ANTERIOR TIBIAL ARTERY ATHERECTOMY, BALLOON ANGIOPLASTY    ORCHIECTOMY Left 3/18/2024    Procedure: ORCHIECTOMY;  Surgeon: Fahad Rivas MD;  Location: [facility];  Service: Urology;  Laterality: Left;     Family History:   Family History   Problem Relation Name Age of Onset    Cancer Mother      Hypertension Mother      Heart failure Father      Stroke Father      Hypertension Father      No Known Problems Sister       Social:   Social History     Tobacco Use    Smoking status: Every Day     Current packs/day: 0.25      Average packs/day: 0.3 packs/day for 40.0 years (10.0 ttl pk-yrs)     Types: Cigarettes     Passive exposure: Current    Smokeless tobacco: Never   Substance Use Topics    Alcohol use: Yes     Alcohol/week: 3.0 standard drinks of alcohol     Types: 3 Cans of beer per week     Comment: socially    Drug use: Not Currently     Frequency: 1.0 times per week     Types: Marijuana     Comment: no use in over 2 months     Allergies: Review of patient's allergies indicates:  No Known Allergies  Home Medications   Prior to Admission medications    Medication Sig Start Date End Date Taking? Authorizing Provider   aspirin (ECOTRIN) 81 MG EC tablet Take 1 tablet (81 mg total) by mouth once daily. 4/9/24 4/9/25  Tha Edmond MD   atorvastatin (LIPITOR) 80 MG tablet Take 1 tablet (80 mg total) by mouth once daily. 8/8/24 8/8/25  Abiodun Recinos DO   cetirizine (ZYRTEC) 10 MG tablet Take 1 tablet (10 mg total) by mouth once daily. 8/8/24 8/3/25  Abiodun Recinos DO   folic acid (FOLVITE) 1 MG tablet Take 1 tablet (1 mg total) by mouth once daily. 4/9/24 4/9/25  Tha Edmond MD   furosemide (LASIX) 40 MG tablet Take 1 tablet (40 mg total) by mouth 2 (two) times a day. 4/23/24 4/23/25  Merle Sun MD   hydrOXYzine HCL (ATARAX) 10 MG Tab Take 50 mg by mouth 2 (two) times daily as needed.    Provider, Historical   metoprolol succinate (TOPROL-XL) 25 MG 24 hr tablet Take 0.5 tablets (12.5 mg total) by mouth once daily. 8/8/24 8/8/25  Abiodun Recinos DO   midodrine (PROAMATINE) 10 MG tablet Take 1 tablet (10 mg total) by mouth 3 (three) times daily with meals. 4/9/24 4/9/25  Tha Edmond MD   pantoprazole (PROTONIX) 40 MG tablet Take 1 tablet (40 mg total) by mouth 2 (two) times a day. 8/8/24 9/7/24  Abiodun Recinos DO   potassium chloride SA (K-DUR,KLOR-CON) 20 MEQ tablet Take 1 tablet (20 mEq total) by mouth 2 (two) times daily.  Patient not taking: Reported on 8/8/2024 6/20/24   David Lynne DO   rivaroxaban (XARELTO) 20 mg Tab  Take 1 tablet (20 mg total) by mouth Daily. 24   Joe Clarke MD   spironolactone (ALDACTONE) 50 MG tablet Take 1 tablet (50 mg total) by mouth once daily. 24   Dayanna Johnson MD   triamcinolone acetonide 0.1% (KENALOG) 0.1 % cream Apply topically Daily.  Patient not taking: Reported on 2024    Provider, Historical   varenicline (CHANTIX STARTING MONTH BOX) 0.5 mg (11)- 1 mg (42) tablet Take one 0.5mg tab by mouth once daily X3 days,then increase to one 0.5mg tab twice daily X4 days,then increase to one 1mg tab twice daily 24   Abiodun Recinos DO       Review of Systems   Review of Systems   Constitutional:  Positive for malaise/fatigue. Negative for chills and fever.   HENT:  Negative for congestion and sore throat.    Eyes:  Negative for blurred vision and double vision.   Respiratory:  Positive for cough, sputum production and shortness of breath.    Cardiovascular:  Positive for chest pain, orthopnea and leg swelling.   Gastrointestinal:  Positive for abdominal pain, nausea and vomiting. Negative for constipation and diarrhea.   Genitourinary:  Negative for dysuria and frequency.   Musculoskeletal:  Negative for back pain and neck pain.   Neurological:  Positive for dizziness. Negative for headaches.        Vital Signs    BP  Min: 113/83  Max: 133/86  Temp  Av °F (36.7 °C)  Min: 98 °F (36.7 °C)  Max: 98 °F (36.7 °C)  Pulse  Av.8  Min: 67  Max: 83  Resp  Av.4  Min: 18  Max: 23  SpO2  Av %  Min: 95 %  Max: 100 %  Height  Av' (182.9 cm)  Min: 6' (182.9 cm)  Max: 6' (182.9 cm)  Weight  Av.8 kg (198 lb)  Min: 89.8 kg (198 lb)  Max: 89.8 kg (198 lb)  Body mass index is 26.85 kg/m².  No intake/output data recorded.    Physical Exam    Physical Exam  Vitals reviewed.   Constitutional:       General: He is not in acute distress.     Appearance: He is not ill-appearing.   HENT:      Mouth/Throat:      Mouth: Mucous membranes are moist.      Pharynx: Oropharynx is  clear.   Neck:      Vascular: JVD present. No hepatojugular reflux.   Cardiovascular:      Rate and Rhythm: Normal rate and regular rhythm.      Pulses: Normal pulses.      Heart sounds: Normal heart sounds. No murmur heard.  Pulmonary:      Effort: No respiratory distress.      Comments: Increased work of breathing on room air. Scattered expiratory wheezes. Bibasilar rales  Abdominal:      General: Bowel sounds are normal.      Palpations: Abdomen is soft.      Tenderness: There is no abdominal tenderness.      Comments: Soft, protuberant abdomen   Musculoskeletal:      Right lower leg: Edema present.      Left lower leg: Edema present.      Comments: Trace BLE edema   Skin:     General: Skin is warm and dry.   Neurological:      Mental Status: He is alert and oriented to person, place, and time.         Labs    Most Recent Data:  CBC:   Lab Results   Component Value Date    WBC 6.24 08/13/2024    HGB 10.5 (L) 08/13/2024    HCT 32.6 (L) 08/13/2024     08/13/2024    MCV 81.3 08/13/2024    RDW 17.8 (H) 08/13/2024     WBC Differential:   Recent Labs   Lab 08/13/24  1133   WBC 6.24   HGB 10.5*   HCT 32.6*      MCV 81.3     BMP:   Lab Results   Component Value Date     08/13/2024    K 2.7 (LL) 08/13/2024     08/13/2024    CO2 24 08/13/2024    BUN 16.2 08/13/2024    CREATININE 1.23 (H) 08/13/2024    CALCIUM 9.2 08/13/2024    MG 1.90 08/13/2024    PHOS 2.7 04/28/2024     LFTs:   Lab Results   Component Value Date    ALBUMIN 3.5 08/13/2024    BILITOT 2.3 (H) 08/13/2024    AST 81 (H) 08/13/2024    ALKPHOS 151 (H) 08/13/2024    ALT 53 08/13/2024     Coags:   Lab Results   Component Value Date    INR 2.7 (H) 06/11/2024     FLP:   Lab Results   Component Value Date    CHOL 46 03/18/2024    HDL <5 (L) 03/18/2024    TRIG 46 04/12/2024     DM:   Lab Results   Component Value Date    HGBA1C 5.0 03/18/2024    HGBA1C 4.7 11/15/2021    HGBA1C 5.7 10/07/2020    CREATININE 1.23 (H) 08/13/2024     Thyroid:    Lab Results   Component Value Date    TSH 1.097 03/18/2024      Anemia:   Lab Results   Component Value Date    IRON 27 (L) 03/18/2024    TIBC 151 (L) 03/18/2024    FERRITIN 1,101.88 (H) 03/18/2024       Lab Results   Component Value Date    EQZJSGXU45 1,078 (H) 03/18/2024       Lab Results   Component Value Date    FOLATE 10.0 03/18/2024        Cardiac:   Lab Results   Component Value Date    TROPONINI 0.030 08/13/2024    BNP 2,531.8 (H) 08/13/2024     Urinalysis:   Lab Results   Component Value Date    LABURIN >/= 100,000 colonies/ml Escherichia coli ESBL (A) 06/20/2024    COLORU Light-Yellow 06/20/2024    SPECGRAV 1.015 05/15/2024    NITRITE Negative 06/20/2024    KETONESU neg 05/15/2024    UROBILINOGEN Normal 06/20/2024    WBCUA 51-99 (A) 06/20/2024       Trended Lab Data:  Recent Labs   Lab 08/13/24  1133   WBC 6.24   HGB 10.5*   HCT 32.6*      MCV 81.3   RDW 17.8*      K 2.7*      CO2 24   BUN 16.2   CREATININE 1.23*   ALBUMIN 3.5   BILITOT 2.3*   AST 81*   ALKPHOS 151*   ALT 53       Trended Cardiac Data:  Recent Labs   Lab 08/13/24  1133 08/13/24  1438   TROPONINI 0.025 0.030   BNP 2,531.8*  --        EKG (my interpretation): atrial fibrillation with PVCs, rate 86 bpm. ST and T wave abnormality, unchanged from previous.     Imaging      Imaging Results              X-Ray Chest PA And Lateral (Final result)  Result time 08/13/24 12:55:38      Final result by Mickey West MD (08/13/24 12:55:38)                   Impression:      No acute cardiopulmonary process identified.      Electronically signed by: Mickey West  Date:    08/13/2024  Time:    12:55               Narrative:    EXAMINATION:  XR CHEST PA AND LATERAL    CLINICAL HISTORY:  Chest Pain;    TECHNIQUE:  Two-view    COMPARISON:  July 16, 2024.    FINDINGS:  Cardiopericardial silhouette enlarged appearance similar.  No dense focal or segmental consolidation, overt congestion, pleural effusion or pneumothorax.                                        Assessment and Plan     CHF Exacerbation   - TTE 3/28/24 EF 50-55% with diastolic dysfunction   - Repeat TTE ordered   - S/p Lasix 40 mg IV in ED. Monitoring potassium with 4:30 BMP. Can consider additional dose tonight   - On Lasix 40 mg PO BID at home   - Strict I&Os   - Daily weights   - Fluid restriction, low sodium diet   - Monitor for improvement in fluid overload   - Ethanol and UDS pending   - Continue home spironolactone 50 mg and metoprolol succinate 12.5 mg daily   - Patient not currently taking previously prescribed Entresto    Hypokalemia   - K 2.7 on admission   - Kcl 40 mEq given in ED   - Repeat CMP 4:30 pm and with AM labs    COPD   - On 2L oxygen at home   - S/p duoneb x1 in ED with subjective improvement in shortness of breath during exam   - PRN duonebs q4h   - Continue to monitor    Atrial fibrillation, rate controlled   - [id number] score 4   - Continue home Xarelto, ASA 81mg    - Continue home metoprolol succinate 12.5 mg daily   - On telemetry    Hx of cardiac arrest   - Has history of cardiac arrest requiring defibrillation x3 in March 2024   - Has prescription for midodrine 10 mg TID. Blood pressure normal upon admission, will hold this medication for today and monitor BP     HLD   - Continue home atorvastatin 80 mg daily    GERD   - Continue home Protonix        CODE STATUS: Full  Access: peripheral IV  Antibiotics: None  Diet: Low sodium, 2 grams  DVT Prophylaxis: Xarelto  GI Prophylaxis: Protonix  Fluids: None      Disposition: Admitted for treatment of CHF exacerbation. Undergoing diuresis, monitoring output and symptom improvement.       Diana Hassan MD  \[facility] [place] [facility]\"" Family Medicine [facility]

## 2024-08-13 NOTE — ED PROVIDER NOTES
Encounter Date: 8/13/2024       History     Chief Complaint   Patient presents with    Chest Pain    Cough     Chest pain, sob, cough, and abdominal swelling since yesterday. Rates pain 4/10 at this time. Afebrile. Vss. 12 lead EKG obtained.     Pt presents with 1-day history of shortness of breath both on exertion and at rest with associated chest tightness, abdominal distention, and loss of appetite.  He was recently seen by cardiology 08/09 and prescribed metropolol which he has not started. He reports only taking Furosemide 40mg x2 today. He is on 2-3L O2 at home.             Review of patient's allergies indicates:  No Known Allergies  Past Medical History:   Diagnosis Date    A-fib     Anticoagulant long-term use     Aortic aneurysm     Cataract     CHF (congestive heart failure)     Chronic atrial fibrillation     COPD (chronic obstructive pulmonary disease)     Coronary artery disease     HLD (hyperlipidemia)     Hypertension     Mitral regurgitation     PAD (peripheral artery disease)     Primary open angle glaucoma (POAG)      Patient Active Problem List   Diagnosis    Primary open angle glaucoma (POAG) of right eye, moderate stage    Combined forms of age-related cataract of left eye    Arteriosclerosis of coronary artery    Chronic atrial fibrillation    Congestive heart failure    Dyslipidemia    Hypertension    Tobacco use    PVD (peripheral vascular disease)    VHD (valvular heart disease)    COVID-19    HFrEF (heart failure with reduced ejection fraction)    Nonrheumatic mitral valve regurgitation    Postoperative eye state    Scrotal hematoma    Chronic obstructive pulmonary disease, unspecified    Lesion of external ear    Low back pain    Other thrombophilia    Secondary hyperaldosteronism    Positive colorectal cancer screening using Cologuard test       Past Surgical History:   Procedure Laterality Date    ANGIOGRAM, CORONARY, WITH LEFT HEART CATHETERIZATION N/A 3/26/2024    Procedure:  Angiogram, Coronary, with Left Heart Cath;  Surgeon: Adriano Montiel MD;  Location: [facility] CATH LAB;  Service: Cardiology;  Laterality: N/A;    ATHERECTOMY OF PERIPHERAL VESSEL Left 09/12/2022    LEFT SFA ATHERECTOMY, BALLOON ANGIOPLASTY    CATARACT EXTRACTION W/  INTRAOCULAR LENS IMPLANT Left 3/9/2023    Procedure: EXTRACTION, CATARACT, WITH IOL INSERTION;  Surgeon: Georgi Borja MD;  Location: [facility];  Service: Ophthalmology;  Laterality: Left;  19.5  mac    EGD, WITH CLOSED BIOPSY  3/22/2024    Procedure: EGD, WITH CLOSED BIOPSY;  Surgeon: Ronald Calderon MD;  Location: [facility] ENDOSCOPY;  Service: Gastroenterology;;    ESOPHAGOGASTRODUODENOSCOPY N/A 3/22/2024    Procedure: EGD;  Surgeon: Ronald Calderon MD;  Location: [facility] ENDOSCOPY;  Service: Gastroenterology;  Laterality: N/A;    Heart Stent N/A     > 10yrs. AGO    INCISION AND DRAINAGE N/A 3/18/2024    Procedure: Incision and Drainage;  Surgeon: Fahad Rivas MD;  Location: [facility];  Service: Urology;  Laterality: N/A;  I&D SCROTAL ABSCESS    INSERTION OF STENT INTO PERIPHERAL VESSEL Right 10/17/2022    RIGHT SFA ATHERECTOMY, BALLOON ANGIOPLASTY, STENT; RIGHT ANTERIOR TIBIAL ARTERY ATHERECTOMY, BALLOON ANGIOPLASTY    ORCHIECTOMY Left 3/18/2024    Procedure: ORCHIECTOMY;  Surgeon: Fahad Rivas MD;  Location: [facility];  Service: Urology;  Laterality: Left;     Family History   Problem Relation Name Age of Onset    Cancer Mother      Hypertension Mother      Heart failure Father      Stroke Father      Hypertension Father      No Known Problems Sister       Social History     Tobacco Use    Smoking status: Every Day     Current packs/day: 0.25     Average packs/day: 0.3 packs/day for 40.0 years (10.0 ttl pk-yrs)     Types: Cigarettes     Passive exposure: Current    Smokeless tobacco: Never   Substance Use Topics    Alcohol use: Yes     Alcohol/week: 3.0 standard drinks of alcohol     Types: 3 Cans of beer per week      Comment: socially    Drug use: Not Currently     Frequency: 1.0 times per week     Types: Marijuana     Comment: no use in over 2 months     Review of Systems   Constitutional:  Positive for appetite change. Negative for fever and unexpected weight change.   Respiratory:  Positive for chest tightness and shortness of breath.    Cardiovascular:  Negative for palpitations.        Chest tightness   Gastrointestinal:  Positive for abdominal distention.   Neurological:  Negative for dizziness and syncope.       Physical Exam     Initial Vitals [08/13/24 1111]   BP Pulse Resp Temp SpO2   133/86 70 18 98 °F (36.7 °C) 99 %      MAP       --         Physical Exam    Nursing note and vitals reviewed.  Constitutional: He appears well-developed.   Pt sitting in hospital bed appearing acutely ill and in some discomfort.    HENT:   Head: Normocephalic and atraumatic.   Neck: No JVD present.   Cardiovascular:            Irregularly irregular   Pulmonary/Chest: He has wheezes. He has no rhonchi.   Abdominal: He exhibits distension. There is abdominal tenderness.   Left lower quadrant tenderness   Musculoskeletal:         General: Edema present.      Comments: Bilateral 1+ edema to mid shin     Neurological: He is alert and oriented to person, place, and time. GCS score is 15. GCS eye subscore is 4. GCS verbal subscore is 5. GCS motor subscore is 6.   Psychiatric: He has a normal mood and affect. His behavior is normal. Thought content normal.         ED Course   Procedures  Labs Reviewed   COMPREHENSIVE METABOLIC PANEL - Abnormal       Result Value    Sodium 141      Potassium 2.7 (*)     Chloride 102      CO2 24      Glucose 91      Blood Urea Nitrogen 16.2      Creatinine 1.23 (*)     Calcium 9.2      Protein Total 7.0      Albumin 3.5      Globulin 3.5      Albumin/Globulin Ratio 1.0 (*)     Bilirubin Total 2.3 (*)      (*)     ALT 53      AST 81 (*)     eGFR >60      Anion Gap 15.0      BUN/Creatinine Ratio 13     B-TYPE  NATRIURETIC PEPTIDE - Abnormal    Natriuretic Peptide 2,531.8 (*)    CBC WITH DIFFERENTIAL - Abnormal    WBC 6.24      RBC 4.01 (*)     Hgb 10.5 (*)     Hct 32.6 (*)     MCV 81.3      MCH 26.2 (*)     MCHC 32.2 (*)     RDW 17.8 (*)     Platelet 207      MPV 9.7      Neut % 63.0      Lymph % 23.9      Mono % 11.9      Eos % 0.5      Basophil % 0.5      Lymph # 1.49      Neut # 3.94      Mono # 0.74      Eos # 0.03      Baso # 0.03      IG# 0.01      IG% 0.2      NRBC% 0.5     TROPONIN I - Normal    Troponin-I 0.025     CBC W/ AUTO DIFFERENTIAL    Narrative:     The following orders were created for panel order CBC auto differential.  Procedure                               Abnormality         Status                     ---------                               -----------         ------                     CBC with Differential[[id number]]       Abnormal            Final result                 Please view results for these tests on the individual orders.   TROPONIN I   MAGNESIUM     EKG Readings: (Independently Interpreted)   Initial Reading: No STEMI. Rhythm: Atrial Fibrillation. Heart Rate: 86.   Atrial fibrillation with premature ventriculuar or aberrantly conducted complexes   ST and T wave abnormality possibly lateral ischemia  Prolonged QT       ECG Results              EKG 12-lead (Chest Pain) Age >30 (In process)        Collection Time Result Time QRS Duration OHS QTC Calculation    08/13/24 11:04:05 08/13/24 11:06:31 102 500                     In process by Interface, Lab In [facility] (08/13/24 11:06:39)                   Narrative:    Test Reason : R07.9,    Vent. Rate : 086 BPM     Atrial Rate : 214 BPM     P-R Int : 000 ms          QRS Dur : 102 ms      QT Int : 418 ms       P-R-T Axes : 000 046 162 degrees     QTc Int : 500 ms    Atrial fibrillation with premature ventricular or aberrantly conducted  complexes  ST and T wave abnormality, consider lateral ischemia  Prolonged QT  Abnormal ECG  When compared  with ECG of 16-JUL-2024 16:44,  The axis Shifted right    Referred By:             Confirmed By:                                   Imaging Results              X-Ray Chest PA And Lateral (Final result)  Result time 08/13/24 12:55:38      Final result by Mickey West MD (08/13/24 12:55:38)                   Impression:      No acute cardiopulmonary process identified.      Electronically signed by: Mickey West  Date:    08/13/2024  Time:    12:55               Narrative:    EXAMINATION:  XR CHEST PA AND LATERAL    CLINICAL HISTORY:  Chest Pain;    TECHNIQUE:  Two-view    COMPARISON:  July 16, 2024.    FINDINGS:  Cardiopericardial silhouette enlarged appearance similar.  No dense focal or segmental consolidation, overt congestion, pleural effusion or pneumothorax.                                    X-Rays:   Independently Interpreted Readings:   Other Readings:  No acute cardiopulmonary process identified.      Medications   albuterol-ipratropium 2.5 mg-0.5 mg/3 mL nebulizer solution 3 mL (has no administration in time range)   potassium chloride SA CR tablet 40 mEq (40 mEq Oral Given 8/13/24 1345)       08/13/2024 2:03 PM     I have seen this patient and performed an independent face to face history and physical examination, and I agree with all evaluation and management as documented by Jason Booker MD.   I have had face-to-face time with this patient, participated in examination, evaluation, management, and disposition..    68 y.o. male presents with dyspnea in the setting of significant heart and lung disease, incomplete medical compliance.  Presently maintaining oxygen saturation with mild tachycardia and wheezing.  Data show some worsening of congestive heart failure, hypokalemia in the setting of atrial fibrillation.  Consult internal medicine for admission.    Jerald Hampton MD     Medical Decision Making  Problems Addressed:  Acute heart failure, unspecified heart failure type: acute illness or  injury  Chest pain: acute illness or injury  Chest tightness: acute illness or injury  History of atrial fibrillation: chronic illness or injury  History of COPD: chronic illness or injury  SOB (shortness of breath): acute illness or injury    Amount and/or Complexity of Data Reviewed  Labs: ordered. Decision-making details documented in ED Course.  Radiology: ordered and independent interpretation performed. Decision-making details documented in ED Course.  ECG/medicine tests: ordered and independent interpretation performed. Decision-making details documented in ED Course.    Risk  Prescription drug management.  Decision regarding hospitalization.      Additional MDM:   Differential Diagnosis:   1) Acute Heart Failure Exacerbation  Elevated BNP, abdominal distention, peripheral edema, shortness of breath suggestive of fluid overload and heart failure exacerbation.     2) Acute coronary syndrome  Chest tightness, SOB, EKG changes in ST and T waves.     3) COPD Exacerbation  Wheezing, increased SOB                                    Clinical Impression:  Final diagnoses:  [R07.9] Chest pain  [I50.9] Acute heart failure, unspecified heart failure type (Primary)  [Z86.79] History of atrial fibrillation  [Z87.09] History of COPD  [R07.89] Chest tightness  [R06.02] SOB (shortness of breath)          ED Disposition Condition    Admit Stable        Jason Booker MD  [facility] Jerald Doran MD  08/13/24 2206

## 2024-08-14 LAB
ALBUMIN SERPL-MCNC: 3.4 G/DL (ref 3.4–4.8)
ALBUMIN/GLOB SERPL: 0.9 RATIO (ref 1.1–2)
ALP SERPL-CCNC: 153 UNIT/L (ref 40–150)
ALT SERPL-CCNC: 93 UNIT/L (ref 0–55)
AMYLASE SERPL-CCNC: 57 UNIT/L (ref 25–125)
ANION GAP SERPL CALC-SCNC: 15 MEQ/L
APICAL FOUR CHAMBER EJECTION FRACTION: 25 %
APICAL TWO CHAMBER EJECTION FRACTION: 19 %
AST SERPL-CCNC: 158 UNIT/L (ref 5–34)
AV INDEX (PROSTH): 0.47
AV MEAN GRADIENT: 4 MMHG
AV PEAK GRADIENT: 8 MMHG
AV VALVE AREA BY VELOCITY RATIO: 2.57 CM²
AV VALVE AREA: 1.91 CM²
AV VELOCITY RATIO: 0.64
BASOPHILS # BLD AUTO: 0.03 X10(3)/MCL
BASOPHILS NFR BLD AUTO: 0.4 %
BILIRUB SERPL-MCNC: 3.7 MG/DL
BSA FOR ECHO PROCEDURE: 2.14 M2
BUN SERPL-MCNC: 19 MG/DL (ref 8.4–25.7)
CALCIUM SERPL-MCNC: 9.6 MG/DL (ref 8.8–10)
CHLORIDE SERPL-SCNC: 99 MMOL/L (ref 98–107)
CHOLEST SERPL-MCNC: 96 MG/DL
CHOLEST/HDLC SERPL: 3 {RATIO} (ref 0–5)
CO2 SERPL-SCNC: 24 MMOL/L (ref 23–31)
CREAT SERPL-MCNC: 1.33 MG/DL (ref 0.73–1.18)
CREAT/UREA NIT SERPL: 14
CV ECHO LV RWT: 0.4 CM
DOP CALC AO PEAK VEL: 1.43 M/S
DOP CALC AO VTI: 21.2 CM
DOP CALC LVOT AREA: 4 CM2
DOP CALC LVOT DIAMETER: 2.27 CM
DOP CALC LVOT PEAK VEL: 0.91 M/S
DOP CALC LVOT STROKE VOLUME: 40.45 CM3
DOP CALC MV VTI: 32.1 CM
DOP CALCLVOT PEAK VEL VTI: 10 CM
E WAVE DECELERATION TIME: 147.19 MSEC
E/A RATIO: 4.21
E/E' RATIO: 17.38 M/S
ECHO LV POSTERIOR WALL: 1.22 CM (ref 0.6–1.1)
EOSINOPHIL # BLD AUTO: 0.01 X10(3)/MCL (ref 0–0.9)
EOSINOPHIL NFR BLD AUTO: 0.1 %
ERYTHROCYTE [DISTWIDTH] IN BLOOD BY AUTOMATED COUNT: 17.9 % (ref 11.5–17)
FRACTIONAL SHORTENING: 12 % (ref 28–44)
GFR SERPLBLD CREATININE-BSD FMLA CKD-EPI: 59 ML/MIN/1.73/M2
GLOBULIN SER-MCNC: 4 GM/DL (ref 2.4–3.5)
GLUCOSE SERPL-MCNC: 116 MG/DL (ref 82–115)
HCT VFR BLD AUTO: 33 % (ref 42–52)
HDLC SERPL-MCNC: 32 MG/DL (ref 35–60)
HGB BLD-MCNC: 10.5 G/DL (ref 14–18)
IMM GRANULOCYTES # BLD AUTO: 0.02 X10(3)/MCL (ref 0–0.04)
IMM GRANULOCYTES NFR BLD AUTO: 0.3 %
INTERVENTRICULAR SEPTUM: 1.65 CM (ref 0.6–1.1)
LDLC SERPL CALC-MCNC: 49 MG/DL (ref 50–140)
LEFT ATRIUM SIZE: 6.57 CM
LEFT INTERNAL DIMENSION IN SYSTOLE: 5.36 CM (ref 2.1–4)
LEFT VENTRICLE DIASTOLIC VOLUME INDEX: 88.83 ML/M2
LEFT VENTRICLE DIASTOLIC VOLUME: 188.32 ML
LEFT VENTRICLE END DIASTOLIC VOLUME APICAL 2 CHAMBER: 221.07 ML
LEFT VENTRICLE END DIASTOLIC VOLUME APICAL 4 CHAMBER: 127.42 ML
LEFT VENTRICLE MASS INDEX: 195 G/M2
LEFT VENTRICLE SYSTOLIC VOLUME INDEX: 65.4 ML/M2
LEFT VENTRICLE SYSTOLIC VOLUME: 138.69 ML
LEFT VENTRICULAR INTERNAL DIMENSION IN DIASTOLE: 6.12 CM (ref 3.5–6)
LEFT VENTRICULAR MASS: 414.4 G
LIPASE SERPL-CCNC: 12 U/L
LV LATERAL E/E' RATIO: 12.64 M/S
LV SEPTAL E/E' RATIO: 27.8 M/S
LVED V (TEICH): 188.32 ML
LVES V (TEICH): 138.69 ML
LVOT MG: 1.59 MMHG
LVOT MV: 0.6 CM/S
LYMPHOCYTES # BLD AUTO: 1.19 X10(3)/MCL (ref 0.6–4.6)
LYMPHOCYTES NFR BLD AUTO: 17.6 %
MAGNESIUM SERPL-MCNC: 1.7 MG/DL (ref 1.6–2.6)
MCH RBC QN AUTO: 26.2 PG (ref 27–31)
MCHC RBC AUTO-ENTMCNC: 31.8 G/DL (ref 33–36)
MCV RBC AUTO: 82.3 FL (ref 80–94)
MONOCYTES # BLD AUTO: 0.76 X10(3)/MCL (ref 0.1–1.3)
MONOCYTES NFR BLD AUTO: 11.2 %
MV MEAN GRADIENT: 3 MMHG
MV PEAK A VEL: 0.33 M/S
MV PEAK E VEL: 1.39 M/S
MV PEAK GRADIENT: 8 MMHG
MV STENOSIS PRESSURE HALF TIME: 42.68 MS
MV VALVE AREA BY CONTINUITY EQUATION: 1.26 CM2
MV VALVE AREA P 1/2 METHOD: 5.15 CM2
NEUTROPHILS # BLD AUTO: 4.76 X10(3)/MCL (ref 2.1–9.2)
NEUTROPHILS NFR BLD AUTO: 70.4 %
NRBC BLD AUTO-RTO: 0.4 %
OHS CV RV/LV RATIO: 0.67 CM
OHS LV EJECTION FRACTION SIMPSONS BIPLANE MOD: 22 %
OHS QRS DURATION: 102 MS
OHS QRS DURATION: 96 MS
OHS QTC CALCULATION: 465 MS
OHS QTC CALCULATION: 500 MS
PISA TR MAX VEL: 3 M/S
PLATELET # BLD AUTO: 215 X10(3)/MCL (ref 130–400)
PMV BLD AUTO: 10.4 FL (ref 7.4–10.4)
POTASSIUM SERPL-SCNC: 3.5 MMOL/L (ref 3.5–5.1)
PROT SERPL-MCNC: 7.4 GM/DL (ref 5.8–7.6)
RA PRESSURE ESTIMATED: 15 MMHG
RBC # BLD AUTO: 4.01 X10(6)/MCL (ref 4.7–6.1)
RIGHT VENTRICULAR END-DIASTOLIC DIMENSION: 4.08 CM
RV TB RVSP: 18 MMHG
SODIUM SERPL-SCNC: 138 MMOL/L (ref 136–145)
TDI LATERAL: 0.11 M/S
TDI SEPTAL: 0.05 M/S
TDI: 0.08 M/S
TR MAX PG: 36 MMHG
TRICUSPID ANNULAR PLANE SYSTOLIC EXCURSION: 1.19 CM
TRIGL SERPL-MCNC: 73 MG/DL (ref 34–140)
TV REST PULMONARY ARTERY PRESSURE: 51 MMHG
VLDLC SERPL CALC-MCNC: 15 MG/DL
WBC # BLD AUTO: 6.77 X10(3)/MCL (ref 4.5–11.5)
Z-SCORE OF LEFT VENTRICULAR DIMENSION IN END DIASTOLE: -0.9
Z-SCORE OF LEFT VENTRICULAR DIMENSION IN END SYSTOLE: 2.14

## 2024-08-14 PROCEDURE — 25000003 PHARM REV CODE 250

## 2024-08-14 PROCEDURE — 94761 N-INVAS EAR/PLS OXIMETRY MLT: CPT

## 2024-08-14 PROCEDURE — 80053 COMPREHEN METABOLIC PANEL: CPT

## 2024-08-14 PROCEDURE — 97162 PT EVAL MOD COMPLEX 30 MIN: CPT

## 2024-08-14 PROCEDURE — 99900035 HC TECH TIME PER 15 MIN (STAT)

## 2024-08-14 PROCEDURE — 63600175 PHARM REV CODE 636 W HCPCS

## 2024-08-14 PROCEDURE — 94640 AIRWAY INHALATION TREATMENT: CPT

## 2024-08-14 PROCEDURE — 97116 GAIT TRAINING THERAPY: CPT

## 2024-08-14 PROCEDURE — 36415 COLL VENOUS BLD VENIPUNCTURE: CPT

## 2024-08-14 PROCEDURE — 83735 ASSAY OF MAGNESIUM: CPT

## 2024-08-14 PROCEDURE — 80061 LIPID PANEL: CPT

## 2024-08-14 PROCEDURE — 21400001 HC TELEMETRY ROOM

## 2024-08-14 PROCEDURE — 93005 ELECTROCARDIOGRAM TRACING: CPT

## 2024-08-14 PROCEDURE — 82150 ASSAY OF AMYLASE: CPT

## 2024-08-14 PROCEDURE — 83690 ASSAY OF LIPASE: CPT

## 2024-08-14 PROCEDURE — 25000242 PHARM REV CODE 250 ALT 637 W/ HCPCS

## 2024-08-14 PROCEDURE — 85025 COMPLETE CBC W/AUTO DIFF WBC: CPT

## 2024-08-14 RX ORDER — MAGNESIUM SULFATE 1 G/100ML
1 INJECTION INTRAVENOUS ONCE
Status: COMPLETED | OUTPATIENT
Start: 2024-08-14 | End: 2024-08-14

## 2024-08-14 RX ORDER — IPRATROPIUM BROMIDE AND ALBUTEROL SULFATE 2.5; .5 MG/3ML; MG/3ML
3 SOLUTION RESPIRATORY (INHALATION)
Status: DISCONTINUED | OUTPATIENT
Start: 2024-08-14 | End: 2024-08-18 | Stop reason: HOSPADM

## 2024-08-14 RX ORDER — POTASSIUM CHLORIDE 20 MEQ/1
40 TABLET, EXTENDED RELEASE ORAL ONCE
Status: COMPLETED | OUTPATIENT
Start: 2024-08-14 | End: 2024-08-14

## 2024-08-14 RX ORDER — FUROSEMIDE 10 MG/ML
40 INJECTION INTRAMUSCULAR; INTRAVENOUS EVERY 12 HOURS
Status: DISCONTINUED | OUTPATIENT
Start: 2024-08-14 | End: 2024-08-14

## 2024-08-14 RX ORDER — POTASSIUM CHLORIDE 7.45 MG/ML
40 INJECTION INTRAVENOUS ONCE
Status: DISCONTINUED | OUTPATIENT
Start: 2024-08-14 | End: 2024-08-14

## 2024-08-14 RX ORDER — PROCHLORPERAZINE EDISYLATE 5 MG/ML
5 INJECTION INTRAMUSCULAR; INTRAVENOUS EVERY 6 HOURS PRN
Status: DISCONTINUED | OUTPATIENT
Start: 2024-08-14 | End: 2024-08-18 | Stop reason: HOSPADM

## 2024-08-14 RX ORDER — POTASSIUM CHLORIDE 7.45 MG/ML
10 INJECTION INTRAVENOUS
Status: DISCONTINUED | OUTPATIENT
Start: 2024-08-14 | End: 2024-08-14

## 2024-08-14 RX ORDER — FUROSEMIDE 10 MG/ML
60 INJECTION INTRAMUSCULAR; INTRAVENOUS EVERY 12 HOURS
Status: DISCONTINUED | OUTPATIENT
Start: 2024-08-14 | End: 2024-08-15

## 2024-08-14 RX ADMIN — SACUBITRIL AND VALSARTAN 2 TABLET: 24; 26 TABLET, FILM COATED ORAL at 11:08

## 2024-08-14 RX ADMIN — RIVAROXABAN 20 MG: 10 TABLET, FILM COATED ORAL at 04:08

## 2024-08-14 RX ADMIN — FOLIC ACID 1 MG: 1 TABLET ORAL at 09:08

## 2024-08-14 RX ADMIN — CETIRIZINE HYDROCHLORIDE 10 MG: 10 TABLET, FILM COATED ORAL at 09:08

## 2024-08-14 RX ADMIN — SACUBITRIL AND VALSARTAN 2 TABLET: 24; 26 TABLET, FILM COATED ORAL at 10:08

## 2024-08-14 RX ADMIN — POTASSIUM CHLORIDE 40 MEQ: 1500 TABLET, EXTENDED RELEASE ORAL at 09:08

## 2024-08-14 RX ADMIN — PROCHLORPERAZINE EDISYLATE 5 MG: 5 INJECTION INTRAMUSCULAR; INTRAVENOUS at 01:08

## 2024-08-14 RX ADMIN — IPRATROPIUM BROMIDE AND ALBUTEROL SULFATE 3 ML: .5; 3 SOLUTION RESPIRATORY (INHALATION) at 02:08

## 2024-08-14 RX ADMIN — ATORVASTATIN CALCIUM 80 MG: 40 TABLET, FILM COATED ORAL at 09:08

## 2024-08-14 RX ADMIN — IPRATROPIUM BROMIDE AND ALBUTEROL SULFATE 3 ML: .5; 3 SOLUTION RESPIRATORY (INHALATION) at 07:08

## 2024-08-14 RX ADMIN — METOPROLOL SUCCINATE 12.5 MG: 25 TABLET, EXTENDED RELEASE ORAL at 09:08

## 2024-08-14 RX ADMIN — MELATONIN TAB 3 MG 6 MG: 3 TAB at 01:08

## 2024-08-14 RX ADMIN — FUROSEMIDE 60 MG: 10 INJECTION, SOLUTION INTRAMUSCULAR; INTRAVENOUS at 11:08

## 2024-08-14 RX ADMIN — SPIRONOLACTONE 50 MG: 25 TABLET ORAL at 09:08

## 2024-08-14 RX ADMIN — PANTOPRAZOLE SODIUM 40 MG: 40 TABLET, DELAYED RELEASE ORAL at 10:08

## 2024-08-14 RX ADMIN — MAGNESIUM SULFATE IN DEXTROSE 1 G: 10 INJECTION, SOLUTION INTRAVENOUS at 09:08

## 2024-08-14 RX ADMIN — ASPIRIN 81 MG: 81 TABLET, COATED ORAL at 09:08

## 2024-08-14 RX ADMIN — FUROSEMIDE 40 MG: 10 INJECTION, SOLUTION INTRAMUSCULAR; INTRAVENOUS at 12:08

## 2024-08-14 RX ADMIN — PANTOPRAZOLE SODIUM 40 MG: 40 TABLET, DELAYED RELEASE ORAL at 09:08

## 2024-08-14 NOTE — CONSULTS
"Cardiology Consult Note     Admitting Team:   Attending Physician: May Angulo MD  Cardiology Attending Physician: Aung Verduzco MD    Date of Admit: 8/13/2024    Reason for Consult: Chest Pain and Cough (Chest pain, sob, cough, and abdominal swelling since yesterday. Rates pain 4/10 at this time. Afebrile. Vss. 12 lead EKG obtained.)       Subjective:      History of Present Illness:  Ran Reyes Jr. is a 68 y.o. male with PMHx of Afib on Xarelto, CHF, COPD on 2L home oxygen prn, CAD, HTN, mitral regurgitation, PAD who presented to [facility] ED on 8/13/2024 with primary complaints of cough, shortness of breath, and abdominal swelling x 2-3 days. Pt reports orthopnea and dyspnea which is worse on exertion. He states his abdomen feels swollen and "hard."  He states that he was having midsternal chest pain that feels like a band across his chest which moved up to shoulder/ throat. It is episodic 1-2 hrs mainly with laying down. Pt is not compliant with his medications. He states he was not taking his metoprolol for days. Pt states he has lower extremity edema. He states he was admitted to the hospital recently, and at that time he coded, and was in a coma for 15 days. Current echo showed a decreased EF of 22% compared to the last echo of 50-55%.     In the ED, pt was hypokalemic, BNP was 2531.8, and troponin was negative. CXR was unremarkable. EKG showed afib with PVCs.       History obtained by patient interview and supplemented by nursing documentation and chart review.     PMHx:   has a past medical history of A-fib, Anticoagulant long-term use, Aortic aneurysm, Cataract, CHF (congestive heart failure), Chronic atrial fibrillation, COPD (chronic obstructive pulmonary disease), Coronary artery disease, HLD (hyperlipidemia), Hypertension, Mitral regurgitation, PAD (peripheral artery disease), and Primary open angle glaucoma (POAG).   SurgHx:   has a past surgical history that includes Heart Stent (N/A); Insertion " of stent into peripheral vessel (Right, 10/17/2022); Atherectomy of peripheral vessel (Left, 09/12/2022); Cataract extraction w/  intraocular lens implant (Left, 3/9/2023); Incision and drainage (N/A, 3/18/2024); Orchiectomy (Left, 3/18/2024); Esophagogastroduodenoscopy (N/A, 3/22/2024); egd, with closed biopsy (3/22/2024); and ANGIOGRAM, CORONARY, WITH LEFT HEART CATHETERIZATION (N/A, 3/26/2024).   FamHx:  family history includes Cancer in his mother; Heart failure in his father; Hypertension in his father and mother; No Known Problems in his sister; Stroke in his father.   SocialHx:   reports that he has been smoking cigarettes. He has a 10 pack-year smoking history. He has been exposed to tobacco smoke. He has never used smokeless tobacco. He reports current alcohol use of about 3.0 standard drinks of alcohol per week. He reports that he does not currently use drugs after having used the following drugs: Marijuana. Frequency: 1.00 time per week.  Allergies: No Known Allergies  Home Meds:     Current Outpatient Medications   Medication Instructions    aspirin (ECOTRIN) 81 mg, Oral, Daily    atorvastatin (LIPITOR) 80 mg, Oral, Daily    cetirizine (ZYRTEC) 10 mg, Oral, Daily    folic acid (FOLVITE) 1 mg, Oral, Daily    furosemide (LASIX) 40 mg, Oral, 2 times daily    hydrOXYzine HCL (ATARAX) 50 mg, Oral, 2 times daily PRN    metoprolol succinate (TOPROL-XL) 12.5 mg, Oral, Daily    midodrine (PROAMATINE) 10 mg, Oral, 3 times daily with meals    pantoprazole (PROTONIX) 40 mg, Oral, 2 times daily    potassium chloride SA (K-DUR,KLOR-CON) 20 MEQ tablet 20 mEq, Oral, 2 times daily    rivaroxaban (XARELTO) 20 mg, Oral, Daily    spironolactone (ALDACTONE) 50 mg, Oral, Daily    triamcinolone acetonide 0.1% (KENALOG) 0.1 % cream Daily    varenicline (CHANTIX STARTING MONTH BOX) 0.5 mg (11)- 1 mg (42) tablet Take one 0.5mg tab by mouth once daily X3 days,then increase to one 0.5mg tab twice daily X4 days,then increase to one  1mg tab twice daily       Review of Systems: All systems have been reviewed and are negative unless otherwise noted in HPI.     Objective:   Last 24 Hour Vital Signs:  BP  Min: 107/73  Max: 152/84  Temp  Av.9 °F (36.6 °C)  Min: 97.6 °F (36.4 °C)  Max: 98.4 °F (36.9 °C)  Pulse  Av.5  Min: 65  Max: 91  Resp  Av.1  Min: 16  Max: 23  SpO2  Av.6 %  Min: 93 %  Max: 100 %  Height  Av' (182.9 cm)  Min: 6' (182.9 cm)  Max: 6' (182.9 cm)  Weight  Av kg (198 lb 6.8 oz)  Min: 89.8 kg (198 lb)  Max: 90.2 kg (198 lb 13.7 oz)  Body mass index is 26.85 kg/m².  I/O last 3 completed shifts:  In: 480 [P.O.:480]  Out: 1100 [Urine:1100]  Physical Examination:  Nursing note and vital signs reviewed.   Constitutional: NAD, not ill-appearing  HEENT: NCAT, PERRLA, normal conjunctivae  Cardiovascular: RRR, no murmurs noted, no chest tenderness to palpation, normal pulses in radial & dorsalis pedis bilaterally   Pulmonary: normal respiratory effort, CTAB, no crackles, wheezes  Abdominal: S/NT/ND  Musculoskeletal: No edema noted to bilateral lower extremities      Neurological: Alert & oriented to self, location, time and situation. At baseline neurological function.  Skin: warm & dry. Capillary refill < 2 seconds.     Laboratory:  Recent Labs   Lab 24  1133 24  1704 24  0539   WBC 6.24  --  6.77   HGB 10.5*  --  10.5*   HCT 32.6*  --  33.0*     --  215   MCV 81.3  --  82.3   RDW 17.8*  --  17.9*    141 138   K 2.7* 2.8* 3.5    101 99   CO2    BUN 16.2 15.2 19.0   CREATININE 1.23* 1.08 1.33*   ALBUMIN 3.5  --  3.4   BILITOT 2.3*  --  3.7*   AST 81*  --  158*   ALKPHOS 151*  --  153*   ALT 53  --  93*     Cardiac Data:  Recent Labs   Lab 24  1133 24  1438   TROPONINI 0.025 0.030   BNP 2,531.8*  --        Other Results:  EKG (my interpretation):     TTE:   Results for orders placed during the hospital encounter of 24    Echo    Interpretation Summary     Left Ventricle: The left ventricle is normal in size. Moderately increased wall thickness. There is low normal systolic function with a visually estimated ejection fraction of 50 - 55%. There is diastolic dysfunction.    Mitral Valve: There is moderate regurgitation.      Stress Testing:   No results found for this or any previous visit.       Coronary Angiogram:   Results for orders placed during the hospital encounter of 03/17/24    Cardiac catheterization    Conclusion  The procedure log was documented by No documenter listed and verified by Adriano Montiel MD.    Procedure:  Selective coronary angiography  Moderate (Conscious) Sedation      Indication: VT arrest, known cardiomyopathy, NSTEMI  Consent: The patient was brought to the cardiac catheterization lab. Was instructed and explained about the risk, benefit and alternatives of the procedure included but not limited to sudden cardiac death, myocardial infarction, bleeding, vascular injury, renal failure, stroke, contrast allergy, risk of conscious sedation and need for emergent bypass surgery.  the patient was agreeable to proceed.  Signed the consent form.  Access:  The patient was prepped using the usual sterile fashion.  Right radial artery  was accessed with micropuncture technique, ultrasound guidance.  An image of the ultrasound was put in the paper chart for purpose of documentation.  Sheath size:6F    Kirill test:  Verified with pulse oximetry deemed to be adequate for radial artery procedure.    Diagnostic catheters : TIG, JL 3.5    Coronary findings:    Dominance: right  Left main:  10-20% calcified distal left main disease  Left anterior descending artery:  50% calcified proximal stenosis  Circumflex artery:  Luminal irregularities  Right coronary artery:  Luminal irregularities    Access Closure:  At the end of the procedure the sheath was removed. A TR band applied to the right radial artery . Excellent hemostasis achieved.      Radiology:  X-Ray  Chest PA And Lateral   Final Result      No acute cardiopulmonary process identified.         Electronically signed by: Mickey West   Date:    08/13/2024   Time:    12:55          Current Medications   albuterol-ipratropium  3 mL Nebulization Q4H WAKE    aspirin  81 mg Oral Daily    atorvastatin  80 mg Oral Daily    cetirizine  10 mg Oral Daily    folic acid  1 mg Oral Daily    furosemide (LASIX) injection  40 mg Intravenous Q12H    metoprolol succinate  12.5 mg Oral Daily    pantoprazole  40 mg Oral BID    rivaroxaban  20 mg Oral Daily    sacubitriL-valsartan  2 tablet Oral BID    spironolactone  50 mg Oral Daily           Current Facility-Administered Medications:     acetaminophen, 650 mg, Oral, Q6H PRN    dextrose 10%, 12.5 g, Intravenous, PRN    dextrose 10%, 25 g, Intravenous, PRN    glucagon (human recombinant), 1 mg, Intramuscular, PRN    glucose, 16 g, Oral, PRN    glucose, 24 g, Oral, PRN    melatonin, 6 mg, Oral, Nightly PRN    naloxone, 0.02 mg, Intravenous, PRN    nicotine, 1 patch, Transdermal, Daily PRN    prochlorperazine, 5 mg, Intravenous, Q6H PRN    sodium chloride 0.9%, 10 mL, Intravenous, Q12H PRN     Assessment:     Ran Reyes JrLogan is a 68 y.o. male with a PMHx  Afib on Xarelto, CHF, COPD on 2L home oxygen prn, CAD, HTN, mitral regurgitation, PAD  presenting with CHF exacerbation.    Active Hospital Problems    Diagnosis    *Acute exacerbation of CHF (congestive heart failure)        Plan:     Acute CHF exacerbation   SOB  HFrEF (22%)  ELIZABETH secondary to Fluid Overload   Echo on 3/28 showed EF 50-55% with diastolic dysfunction  Echo today showed EF of 22%  BNP 2531  Will begin GDMT therapy  Will increase diurese with Lasix 60 mg IV   Continue metoprolol 6.25mg, sacubitril-valsartan 48-52mg, Spironolactone and Entresto but monitor because of the creatinine   Will plan Jardiance at a later time after more diuresis   Strict Is&Os with cardiac diet      We will continue to follow with  you.    Sydnie Guzman, DO  [facility] IM resident PGY-1

## 2024-08-14 NOTE — PROGRESS NOTES
[facility] Medicine  Progress Note      Resident Team: [facility] Medicine List 2  Attending Physician: May Angulo MD        Subjective:      Brief HPI:  Ran Reyes Jr. is a 68 y.o. male with PMH of Afib on Xarelto, CHF, COPD on 2L home oxygen prn, CAD, HTN, mitral regurgitation, PAD who presented to [facility] ED on 8/13/2024 with primary complaints of cough, shortness of breath, and abdominal swelling x 2 days. Patient reports orthopnea and dyspnea worsening on exertion. He has needed to use his home oxygen more frequently the past two days. Patient also feels his abdomen is more distended and c/o epigastric burning pain, nausea and 1 episode of vomiting prior to ED arrival. Patient follows with [facility] cardiology. He reports a previous discussion of AICD placement, but this was postponed when he developed a scrotal abscess requiring hospitalization. Pt reports compliance with home Lasix 40mg BID, spironolactone 50mg qd, Toprol 6.25 qd, and Xarelto 20mg. Per chart review, patient is also prescribed Entresto, however he states he is unaware of this medication.      In the ED, vitals were stable, pt sating 99% on RA. Labs significant for hypokalemia (2.7); pt noncompliant with prescribed KCL supplement. BNP elevated at 2500. Troponin negative. CXR unremarkable. EKG showed afib with PVCs, rate 86 bpm. ST and T wave abnormality, unchanged from previous. He was given duoneb x1 in the ED with subjective improvement in shortness of breath. Medicine was consulted for CHF exacerbation and hypokalemia.     Interval History:   Pt lying in bed, appeared comfortable with no complaints on initial rounds this morning. He reported upset stomach overnight that was relieved after prochlorperazine injection and a large bowel movement. He continued to report SOB but denied new or worsening symptoms and denies any CP. During rounds with attending, patient reported sudden onset of burning epigastric pain and nausea and  proceeded to vomit large amount of emesis. He reported pain was relieved afterwards. Vitals have remained stable.       Review of Systems:  ROS completed and negative except as indicated above.     Objective:     Last 24 Hour Vital Signs:  BP  Min: 110/75  Max: 152/84  Temp  Av °F (36.7 °C)  Min: 97.6 °F (36.4 °C)  Max: 98.4 °F (36.9 °C)  Pulse  Av  Min: 65  Max: 91  Resp  Av.1  Min: 16  Max: 23  SpO2  Av.6 %  Min: 93 %  Max: 100 %  Height  Av' (182.9 cm)  Min: 6' (182.9 cm)  Max: 6' (182.9 cm)  Weight  Av kg (198 lb 6.8 oz)  Min: 89.8 kg (198 lb)  Max: 90.2 kg (198 lb 13.7 oz)  I/O last 3 completed shifts:  In: 480 [P.O.:480]  Out: 1100 [Urine:1100]    Physical Examination:  Physical Exam  Constitutional:       Appearance: He is ill-appearing.   Eyes:      Extraocular Movements: Extraocular movements intact.      Conjunctiva/sclera: Conjunctivae normal.   Cardiovascular:      Rate and Rhythm: Normal rate. Rhythm irregular.      Pulses: Normal pulses.   Pulmonary:      Breath sounds: Decreased air movement present. Decreased breath sounds present.      Comments: Increased WOB on 2L via NC. Decreased breath sounds throughout all lung fields. No crackles.   Abdominal:      General: There is no distension.      Palpations: Abdomen is soft.      Tenderness: There is no abdominal tenderness. There is no guarding.   Musculoskeletal:      Cervical back: Normal range of motion.      Right lower leg: Edema present.      Left lower leg: Edema present.      Comments: Trace pitting edema bilaterally   Skin:     General: Skin is warm and dry.   Neurological:      General: No focal deficit present.      Mental Status: He is alert.   Psychiatric:         Thought Content: Thought content normal.           Laboratory:  Most Recent Data:  CBC:   Lab Results   Component Value Date    WBC 6.77 2024    HGB 10.5 (L) 2024    HCT 33.0 (L) 2024     2024    MCV 82.3 2024     RDW 17.9 (H) 08/14/2024     WBC Differential:   Recent Labs   Lab 08/13/24  1133 08/14/24  0539   WBC 6.24 6.77   HGB 10.5* 10.5*   HCT 32.6* 33.0*    215   MCV 81.3 82.3     BMP:   Lab Results   Component Value Date     08/14/2024    K 3.5 08/14/2024    CL 99 08/14/2024    CO2 24 08/14/2024    BUN 19.0 08/14/2024    CREATININE 1.33 (H) 08/14/2024    CALCIUM 9.6 08/14/2024    MG 1.70 08/14/2024    PHOS 2.7 04/28/2024     LFTs:   Lab Results   Component Value Date    ALBUMIN 3.4 08/14/2024    BILITOT 3.7 (H) 08/14/2024     (H) 08/14/2024    ALKPHOS 153 (H) 08/14/2024    ALT 93 (H) 08/14/2024     DM:   Lab Results   Component Value Date    HGBA1C 5.0 03/18/2024    HGBA1C 4.7 11/15/2021    HGBA1C 5.7 10/07/2020    CREATININE 1.33 (H) 08/14/2024     Cardiac:   Lab Results   Component Value Date    TROPONINI 0.030 08/13/2024    BNP 2,531.8 (H) 08/13/2024       Radiology:  Imaging Results              X-Ray Chest PA And Lateral (Final result)  Result time 08/13/24 12:55:38      Final result by Mickey West MD (08/13/24 12:55:38)                   Impression:      No acute cardiopulmonary process identified.      Electronically signed by: Mickey West  Date:    08/13/2024  Time:    12:55               Narrative:    EXAMINATION:  XR CHEST PA AND LATERAL    CLINICAL HISTORY:  Chest Pain;    TECHNIQUE:  Two-view    COMPARISON:  July 16, 2024.    FINDINGS:  Cardiopericardial silhouette enlarged appearance similar.  No dense focal or segmental consolidation, overt congestion, pleural effusion or pneumothorax.                                      Current Medications:     Infusions:       Scheduled:   albuterol-ipratropium  3 mL Nebulization Q4H WAKE    aspirin  81 mg Oral Daily    atorvastatin  80 mg Oral Daily    cetirizine  10 mg Oral Daily    folic acid  1 mg Oral Daily    furosemide (LASIX) injection  40 mg Intravenous Q12H    metoprolol succinate  12.5 mg Oral Daily    pantoprazole  40 mg Oral BID     rivaroxaban  20 mg Oral Daily    sacubitriL-valsartan  2 tablet Oral BID    spironolactone  50 mg Oral Daily        PRN:    Current Facility-Administered Medications:     acetaminophen, 650 mg, Oral, Q6H PRN    dextrose 10%, 12.5 g, Intravenous, PRN    dextrose 10%, 25 g, Intravenous, PRN    glucagon (human recombinant), 1 mg, Intramuscular, PRN    glucose, 16 g, Oral, PRN    glucose, 24 g, Oral, PRN    melatonin, 6 mg, Oral, Nightly PRN    naloxone, 0.02 mg, Intravenous, PRN    nicotine, 1 patch, Transdermal, Daily PRN    prochlorperazine, 5 mg, Intravenous, Q6H PRN    sodium chloride 0.9%, 10 mL, Intravenous, Q12H PRN    Assessment & Plan:     CHF Exacerbation  - CXR unremarkable  - Previous echo with EF of 50-55%. Repeat echo (8/14) with EF now 22%   - Strict I&Os and Daily weights  - Fluid restriction, low sodium diet  - Ethanol and UDS negative  - Continue Lasix 40mg IV b.i.d.   - Continue home spironolactone 50 mg and metoprolol succinate 12.5 mg daily  - Entresto 24mg/26mg twice daily restarted  - Per outpatient cardiology notes, AICD previously discussed  - Cardiology consulted for recommendations      COPD on home 2L O2 prn  - Supplemental oxygen prn for goal SpO2 88-92%  - Duonebs q4hr for wheezing   - Pt would likely benefit from dose of IV solumedrol. Will await cardiology recs given acute CHF exacerbation     Hypokalemia  - K: 2.7 on admission  - Continue to monitor, will replete as needed.      Transaminitis   - LFTs acutely elevated with no prior hx of hepatic disease  - Pt denies ETOH abuse. ETOH level and UDS negative on admission.   - Lipid panel, Amylase, Lipase, Hep panel ordered      Atrial fibrillation, rate controlled   - [id number] score 4   - Continue home Xarelto, ASA 81mg    - Continue home metoprolol succinate 6.25 mg daily   - On telemetry     Hx of cardiac arrest   - Has history of cardiac arrest requiring defibrillation x3 in March 2024. Pt was discharged on home midodrine    - BP  stable. Hold home midodrine     HLD  - Continue home atorvastatin 80 mg daily    GERD  - Continue home Protonix 40mg bid     Tobacco abuse  - 24 pack year smoking history  - Received a prescription for Chantix on the day of admission, but did not start it.   - Patient counseled on the benefits of quitting smoking as well as the risks to his current comorbidities  - Nicotine patches prn on board     Code Status: Full  Access: PIV  Antibiotics: None  Diet: Low sodium  DVT Prophylaxis: Xarelto  GI Prophylaxis: Protonix  Fluids: None     Disposition: Continue IV diuresis and GDMT. Cardiology consulted. Discharge pending patient progress.         Jennifer Enamorado DO  [facility] HO-2

## 2024-08-14 NOTE — PROGRESS NOTES
Inpatient Nutrition Assessment    Admit Date: 8/13/2024   Total duration of encounter: 1 day   Patient Age: 68 y.o.    Nutrition Recommendation/Prescription     Continue low Na diet   Will order boost tid; Boost (provides 240 kcal, 10 g protein per serving)   Pt education on diet/complete  MVI/fe  Biweekly wt  Will monitor nutrition status     Communication of Recommendations: reviewed with nurse and reviewed with patient    Nutrition Assessment     Malnutrition Assessment/Nutrition-Focused Physical Exam    Malnutrition Context: chronic illness (08/14/24 1239)  Malnutrition Level: other (see comments) (does not meet malnutrition criteria) (08/14/24 1239)  Energy Intake (Malnutrition): other (see comments) (does not meet criteria) (08/14/24 1239)decreased appetite approx week  Weight Loss (Malnutrition):  (does not meet criteria) (08/14/24 1239)     Orbital Region (Subcutaneous Fat Loss): well nourished  Upper Arm Region (Subcutaneous Fat Loss): well nourished        [place] Region (Muscle Loss): well nourished  Clavicle Bone Region (Muscle Loss): well nourished                    Fluid Accumulation (Malnutrition): mild (08/14/24 1239)        A minimum of two characteristics is recommended for diagnosis of either severe or non-severe malnutrition.    Chart Review    Reason Seen: continuous nutrition monitoring    Malnutrition Screening Tool Results   Have you recently lost weight without trying?: No  Have you been eating poorly because of a decreased appetite?: No   MST Score: 0   Diagnosis:  CHF, hypokalemia, COPD, afib, Hx cardiac arrest, HLD, GERD    Relevant Medical History: Afib, CHF, COPD, CAD, HTN, mitral regurgitation     Scheduled Medications:  albuterol-ipratropium, 3 mL, Q4H WAKE  aspirin, 81 mg, Daily  atorvastatin, 80 mg, Daily  cetirizine, 10 mg, Daily  folic acid, 1 mg, Daily  furosemide (LASIX) injection, 40 mg, Q12H  metoprolol succinate, 12.5 mg, Daily  pantoprazole, 40 mg, BID  rivaroxaban, 20 mg,  Daily  sacubitriL-valsartan, 2 tablet, BID  spironolactone, 50 mg, Daily    Continuous Infusions:   PRN Medications:  acetaminophen, 650 mg, Q6H PRN  dextrose 10%, 12.5 g, PRN  dextrose 10%, 25 g, PRN  glucagon (human recombinant), 1 mg, PRN  glucose, 16 g, PRN  glucose, 24 g, PRN  melatonin, 6 mg, Nightly PRN  naloxone, 0.02 mg, PRN  nicotine, 1 patch, Daily PRN  prochlorperazine, 5 mg, Q6H PRN  sodium chloride 0.9%, 10 mL, Q12H PRN    Calorie Containing IV Medications: no significant kcals from medications at this time    Recent Labs   Lab 08/13/24  1133 08/13/24  1704 08/14/24  0539    141 138   K 2.7* 2.8* 3.5   CALCIUM 9.2 9.6 9.6   MG 1.90  --  1.70   CO2 24 24 24   BUN 16.2 15.2 19.0   CREATININE 1.23* 1.08 1.33*   EGFRNORACEVR >60 >60 59   GLUCOSE 91 130* 116*   BILITOT 2.3*  --  3.7*   ALKPHOS 151*  --  153*   ALT 53  --  93*   AST 81*  --  158*   ALBUMIN 3.5  --  3.4   WBC 6.24  --  6.77   HGB 10.5*  --  10.5*   HCT 32.6*  --  33.0*     Nutrition Orders:  Diet Low Sodium, 2gm      Appetite/Oral Intake: fair/50-75% of meals; decreased appetite approx week; usually eats well   Factors Affecting Nutritional Intake: decreased appetite and shortness of breath  Social Needs Impacting Access to Food: none identified  Food/Taoist/Cultural Preferences: none reported  Food Allergies: none reported  Last Bowel Movement: 08/12/24  Wound(s):  none    Comments    (8/14) Pt reported SOB little better; has not been feeling well past week; appetite decreased; usually eats well; no N/v; no wt loss; + 2 leg edema; on diuretic. Abnormal labs noted:LFT elevated; no hx liver dysfunction; monitor. Pt education complete on diet tx.     Anthropometrics    Height: 6' (182.9 cm), Height Method: Stated  Last Weight: 89.8 kg (198 lb) (08/14/24 0700), Weight Method: Standard Scale  BMI (Calculated): 26.8  BMI Classification: overweight (BMI 25-29.9)        Ideal Body Weight (IBW), Male: 178 lb     % Ideal Body Weight, Male  (lb): 111.24 %                 Usual Body Weight (UBW), k.5 kg  % Usual Body Weight: 101.69     Usual Weight Provided By: patient and EMR weight history    Wt Readings from Last 5 Encounters:   24 89.8 kg (198 lb)   24 92 kg (202 lb 12.8 oz)   24 89.3 kg (196 lb 15 oz)   24 79.1 kg (174 lb 6.4 oz)   24 87.2 kg (192 lb 3.9 oz)     Weight Change(s) Since Admission: no wt loss   Wt Readings from Last 1 Encounters:   24 0700 89.8 kg (198 lb)   24 0600 90.2 kg (198 lb 13.7 oz)   24 2145 90 kg (198 lb 6.6 oz)   24 1111 89.8 kg (198 lb)   Admit Weight: 89.8 kg (198 lb) (24 1111), Weight Method: Standard Scale    Estimated Needs    Weight Used For Calorie Calculations: 89.8 kg (197 lb 15.6 oz)  Energy Calorie Requirements (kcal): 2245 kcal/d; 25 tyron/kg  Energy Need Method: Kcal/kg  Weight Used For Protein Calculations: 89.8 kg (197 lb 15.6 oz)  Protein Requirements: 98 gm protein/d; 1.1 gm/kg  Fluid Requirements (mL): 2245 ml/d; 1ml/tyron        Enteral Nutrition     Patient not receiving enteral nutrition at this time.    Parenteral Nutrition     Patient not receiving parenteral nutrition support at this time.    Evaluation of Received Nutrient Intake    Calories: not meeting estimated needs  Protein: not meeting estimated needs    Patient Education     Education Provided: heart healthy diet  Teaching Method: explanation and printed materials  Comprehension: verbalizes understanding  Barriers to Learning: none evident  Expected Compliance: fair  Comments: All questions were answered and dietitian's contact information was provided.     Nutrition Diagnosis     PES: Inadequate oral intake related to chronic illness as evidenced by eating < 75% meal x week; usually eats well. (new)       Nutrition Interventions     Intervention(s): modified composition of meals/snacks, commercial beverage, multivitamin/mineral supplement therapy, purpose of nutrition education,  and collaboration with other providers    Goal: Meet greater than 80% of nutritional needs by follow-up. (new)  Goal: Maintain weight throughout hospitalization. (new)    Nutrition Goals & Monitoring     Dietitian will monitor: food and beverage intake, weight, food/nutrition knowledge skill, and glucose/endocrine profile  Discharge planning: continue cardiac  diet with boost oral supplements  Nutrition Risk/Follow-Up: low (follow-up in 5-7 days)   Please consult if re-assessment needed sooner.

## 2024-08-14 NOTE — PT/OT/SLP EVAL
Physical Therapy Evaluation    Patient Name:  Ran Reyes Jr.   MRN:  [id number]    Recommendations:     Therapy Intensity Recommendations at Discharge: Low Intensity Therapy  Discharge Equipment Recommendations: cane, straight, oxygen   Equipment to be obtained for discharge: none.  Barriers to discharge: fall risk and decreased endurance    Assessment:     Ran Reyes Jr. is a 68 y.o. male admitted with a medical diagnosis of Acute exacerbation of CHF (congestive heart failure).  1. Acute heart failure, unspecified heart failure type    2. Chest pain    3. History of atrial fibrillation    4. History of COPD    5. Chest tightness    6. SOB (shortness of breath)       Patient Active Problem List   Diagnosis    Primary open angle glaucoma (POAG) of right eye, moderate stage    Combined forms of age-related cataract of left eye    Arteriosclerosis of coronary artery    Chronic atrial fibrillation    Congestive heart failure    Dyslipidemia    Hypertension    Tobacco use    PVD (peripheral vascular disease)    VHD (valvular heart disease)    COVID-19    HFrEF (heart failure with reduced ejection fraction)    Nonrheumatic mitral valve regurgitation    Postoperative eye state    Scrotal hematoma    Chronic obstructive pulmonary disease, unspecified    Lesion of external ear    Low back pain    Other thrombophilia    Secondary hyperaldosteronism    Positive colorectal cancer screening using Cologuard test    Acute exacerbation of CHF (congestive heart failure)      He presents with the following impairments/functional limitations:  impaired endurance, impaired cardiopulmonary response to activity, gait instability, impaired balance, impaired functional mobility.    Rehab Prognosis: Good.    Patient would benefit from continued skilled acute PT services to: address above listed impairments/functional limitations; receive patient/caregiver education; reduce fall risk; and maximize independency/safety with  functional mobility.    -continued: up-to-chair, ambulation, with progression of gait distance/frequency/duration and speed, as tolerated/appropriate, with assistance and supervision     Recent Surgery: * No surgery found *      Plan:     During this hospitalization, patient to be seen 5 x/week to address the identified impairments/functional limitations via gait training, therapeutic activities, therapeutic exercises and progress toward the established goals.    Plan of Care Expires:  09/11/24    Subjective     Communicated with patient's nurse Felipa prior to session.    Patient agreeable to participate in evaluation.     Chief Complaint: none  Patient/Family Comments/goals: home  Pain/Comfort:  Pain Rating 1: 0/10  Pain Addressed 1: Nurse notified  Pain Rating Post-Intervention 1: 0/10    Patients cultural, spiritual, Presybeterian conflicts given the current situation: no    Social History  Living Environment: Patient lives alone in a first floor apartment, with no steps, threshold only , with tub-shower combo.  Functional Level: Prior to admission patient was disabled, was a passenger, ambulated without assistive device, was independent in ADL's, and required assistance with IADL's including transportation.  Equipment Used at Home: cane, straight, oxygen  Equipment owned (not currently used): none.  Assistance Upon Discharge: family.    Hand dominance: left    Patient denied lightheadedness/dizziness.  Patient denied extremity tingling/numbness.  Patient denied current swallowing difficulty.  Patient denied 'new' vision impairment.     Objective:     Patient found sitting in chair with peripheral IV, telemetry upon PT entry to room.    General Precautions: Standard, fall   Orthopedic Precautions:N/A   Braces: N/A  Respiratory Status: 1 liters/min O2 via nasal cannula  SAT O2 Check: n/a    Vitals   At Rest (pre-session)  BP  102/65   HR  77   O2 Sat %  97     With Activity (post-session)  BP  105/70   HR  63   O2  Sat %  100     Exams:  Orientation: Patient is oriented to person, place, time, situation  Commands: Patient follows multi-step verbal commands  Fine Motor Coordination:     -     Intact: LLE heel shin, RLE heel shin, Rapid alternating ankle DF/PF, Left hand thumb/finger opposition skills, and Right hand thumb/finger opposition skills  Sensation:    -     Intact: light/touch bilat lower extremity and bilat upper extremity  RUE ROM: WFL  RUE Strength: WFL  LUE ROM: WFL  LUE Strength: WFL  RLE ROM: WFL  RLE Strength: WFL  LLE ROM: WFL  LLE Strength: WFL    Functional Mobility:    Bed Mobility:  Seated in chair at start of session and returned to chair at end of session    Transfers:  Sit to Stand: supervision with no assistive device  with no cues required    Gait:  Patient ambulated 200ft with no assistive device and hand-held assist and contact guard assistance.  Patient demonstrates :       occasional unsteady gait       decreased susie       decreased step length       inconsistent bilateral foot/floor clearance       inconsistent bilateral foot placement       mis-step x1 during gait session w/ patient able to self correct    Other Mobility:  not assessed    Balance:  Sit  Patient demonstrated static balance on level surface with independence with no verbal cues.  Patient demonstrated dynamic balance on level surface with modified independence with no verbal cues during moderate excursions.  Stand  Patient demonstrated static balance on level surface  using no device with modified independence with no verbal cues.    Additional Treatment Session  GAIT x1 unit (s), x8 minutes:  Patient ambulated 200ft with no assistive device and hand-held assist and contact guard assistance.  Patient demonstrates :       gait pattern - unchanged from 1st gait session except no mis-step, but slightly slowed susie and decreased step length    Patient left sitting in chair with peripheral IV, telemetry with all lines intact,  call button in reach, tray table at bedside, and patient's nurse notified.    Education     Patient educated on the importance of early mobility to prevent functional decline during hospital stay.  Patient educated on and assisted with functional mobility as noted above.  Patient educated on PT Plan of Care and role of PT in acute care.  Patient was instructed to utilize staff assistance for mobility/transfers.  White board updated regarding patient's safest level of mobility with staff assistance.    Goals     Multidisciplinary Problems       Physical Therapy Goals       Problem: Physical Therapy    Goal Priority Disciplines Outcome Goal Variances Interventions   Physical Therapy Goal     PT, PT/OT      Description: ESTABLISHED 08/14/2024  Goals to be met by: DISCHARGE  Patient will increase functional independence with mobility by performing:  -. Supine to sit with Wilcox  -. Sit to supine with Wilcox  -. Rolling to Left and Right with Wilcox  -. Sit to stand transfer with Modified Wilcox  -. Gait  x 260 feet with Supervision using Single-point Cane           History:     Past Medical History:   Diagnosis Date    A-fib     Anticoagulant long-term use     Aortic aneurysm     Cataract     CHF (congestive heart failure)     Chronic atrial fibrillation     COPD (chronic obstructive pulmonary disease)     Coronary artery disease     HLD (hyperlipidemia)     Hypertension     Mitral regurgitation     PAD (peripheral artery disease)     Primary open angle glaucoma (POAG)      Past Surgical History:   Procedure Laterality Date    ANGIOGRAM, CORONARY, WITH LEFT HEART CATHETERIZATION N/A 3/26/2024    Procedure: Angiogram, Coronary, with Left Heart Cath;  Surgeon: Adriano Montile MD;  Location: [facility] CATH LAB;  Service: Cardiology;  Laterality: N/A;    ATHERECTOMY OF PERIPHERAL VESSEL Left 09/12/2022    LEFT SFA ATHERECTOMY, BALLOON ANGIOPLASTY    CATARACT EXTRACTION W/  INTRAOCULAR LENS IMPLANT Left  3/9/2023    Procedure: EXTRACTION, CATARACT, WITH IOL INSERTION;  Surgeon: Georgi Borja MD;  Location: [facility];  Service: Ophthalmology;  Laterality: Left;  19.5  mac    EGD, WITH CLOSED BIOPSY  3/22/2024    Procedure: EGD, WITH CLOSED BIOPSY;  Surgeon: Ronald Calderon MD;  Location: [facility] ENDOSCOPY;  Service: Gastroenterology;;    ESOPHAGOGASTRODUODENOSCOPY N/A 3/22/2024    Procedure: EGD;  Surgeon: Ronald Calderon MD;  Location: [facility] ENDOSCOPY;  Service: Gastroenterology;  Laterality: N/A;    Heart Stent N/A     > 10yrs. AGO    INCISION AND DRAINAGE N/A 3/18/2024    Procedure: Incision and Drainage;  Surgeon: Fahad Rivas MD;  Location: [facility];  Service: Urology;  Laterality: N/A;  I&D SCROTAL ABSCESS    INSERTION OF STENT INTO PERIPHERAL VESSEL Right 10/17/2022    RIGHT SFA ATHERECTOMY, BALLOON ANGIOPLASTY, STENT; RIGHT ANTERIOR TIBIAL ARTERY ATHERECTOMY, BALLOON ANGIOPLASTY    ORCHIECTOMY Left 3/18/2024    Procedure: ORCHIECTOMY;  Surgeon: Fahad Rivas MD;  Location: [facility];  Service: Urology;  Laterality: Left;     Time Tracking:     PT Received On: 08/14/24  PT Start Time: 1531     PT Stop Time: 1607  PT Total Time (min): 36 min     Billable Minutes: Evaluation 28 and Gait Training 8  Non-Billable Minutes: n/a  08/14/2024

## 2024-08-14 NOTE — MEDICAL/APP STUDENT
"[facility] Medicine  Progress Note    Admission Date: 8/13/2024  Code Status: Full Code   Attending Physician: May Angulo MD   Expected Discharge Date:   Principal Problem:Acute exacerbation of CHF (congestive heart failure)    Subjective:   Ran Reyes Jr. is a 68 y.o. male with PMH significant for atrial fibrillation on Xarelto, CHF, COPD on 2L home oxygen, CAD, HTN, mitral regurgitation, PAD who presented to [facility] ED on 8/13/2024 with primary complaints of cough, shortness of breath, and abdominal swelling x 2 days. States that he has been unable to lie flat due to increased shortness of breath and difficulty breathing. He has needed to use his home oxygen more frequently the past two days. He feels like his abdomen has been more swollen which has caused some discomfort and nausea. Reports one episode of vomiting this morning. Also having epigastric burning with deep inspiration. Has also had increased lower extremity swelling. States that he has been adherent to his daily medications.     In the ED, vitals upon arrival were T 98, GIOVANIN 133/86, , RR 18, SpO2 99% on RA. Labs significant for K 2.7, creatinine 1.23, , AST 81, BNP 2531.8. Troponin negative. CXR performed and showed no acute cardiopulmonary process. EKG showed atrial fibrillation with PVCs, rate 86 bpm. ST and T wave abnormality, unchanged from previous. He was given duoneb x1 in the ED with subjective improvement in shortness of breath. He was also given Lasix 40 mg IV and Kcl 40 mEq. Medicine was consulted for further management.    Interval History:   Patient was seen and examined this morning while he was sitting up in the bedside chair. He reported upset stomach overnight that was relieved with prochlorperazine injection and subsequently had big bowel movement, but reports "not quite diarrhea". Still having shortness of breath, but was able to rest some overnight. Lungs still significant for bibasilar " crackles and expiratory wheezing scattered amongst the fields. Bilateral lower extremity edema still present. On rounds with attending, after deep inspiration, patient reports burning epigastric pain with nausea, then vomited volumous amount of fluid mostly clear with slight yellow tinge.     Objective:     Vital Signs (Most Recent):  Temp: 98.3 °F (36.8 °C) (08/14/24 0353)  Pulse: 89 (08/14/24 0353)  Resp: 18 (08/14/24 0353)  BP: (!) 152/84 (08/14/24 0700)  SpO2: 97 % (08/14/24 0700) Vital Signs (24h Range):  Temp:  [97.6 °F (36.4 °C)-98.3 °F (36.8 °C)] 98.3 °F (36.8 °C)  Pulse:  [65-89] 89  Resp:  [16-23] 18  SpO2:  [95 %-100 %] 97 %  BP: (110-152)/(75-97) 152/84     Weight: 89.8 kg (198 lb)  Body mass index is 26.85 kg/m².    SpO2: 97 %       Physical Exam  Vitals reviewed.   Constitutional:       Appearance: He is ill-appearing.   HENT:      Head: Normocephalic.      Nose: No congestion or rhinorrhea.      Mouth/Throat:      Mouth: Mucous membranes are moist.   Eyes:      General: Scleral icterus present.      Extraocular Movements: Extraocular movements intact.   Neck:      Vascular: JVD present.   Cardiovascular:      Rate and Rhythm: Normal rate. Rhythm irregular.   Pulmonary:      Breath sounds: Wheezing and rales present.   Abdominal:      General: Abdomen is protuberant. There is distension.      Palpations: Abdomen is soft.      Comments: Mild epigastric burning/pain   Genitourinary:     Comments: Deferred  Musculoskeletal:         General: Normal range of motion.      Cervical back: Normal range of motion.      Right lower leg: Edema present.      Left lower leg: Edema present.   Skin:     General: Skin is warm and dry.   Neurological:      General: No focal deficit present.      Mental Status: He is alert and oriented to person, place, and time.   Psychiatric:         Thought Content: Thought content normal.         Judgment: Judgment normal.       Assessment and Plan:     CHF Exacerbation  - ChestXR  reports no acute cardiopulmonary process.   - Echo (8/14/24) shows EF reduced to 22% from previous echo EF of 50-55%.   - Strict I&Os and Daily weights  - Fluid restriction, low sodium diet  - Ethanol and UDS negative  - Continue Lasix 40mg IV b.i.d.   - Continue home spironolactone 50 mg and metoprolol succinate 12.5 mg daily  - Entresto 24mg/26mg twice daily restarted     Hypokalemia  - K: 2.7 on admission  - 150 mEq of Kcl since admission  - K: 3.5 this morning  - Continue to monitor, will replete as needed.      COPD   - On 1L oxygen via NC currently   - Duonebs scheduled q4hr while patient is awake to help with wheezing    - Continue to monitor     Atrial fibrillation, rate controlled   - [id number] score 4   - Continue home Xarelto, ASA 81mg    - Continue home metoprolol succinate 12.5 mg daily   - On telemetry     Hx of cardiac arrest   - Has history of cardiac arrest requiring defibrillation x3 in March 2024   - Hold home midodrine     HLD  - Continue home atorvastatin 80 mg daily  - Lipid panel, Amylase, Lipase ordered     GERD  - Continue home Protonix    Tobacco abuse  - 24 pack year smoking history  - Received a prescription for Chantix on the day of admission, but did not start it.   - Patient counseled on the benefits of quitting smoking as well as the risks to his current comorbidities  - Nicotine patches prn on board    Code Status: Full  Access: PIV  Antibiotics: None  Diet: Low sodium  DVT Prophylaxis: Xarelto  GI Prophylaxis: Protonix  Fluids: None    Disposition: Continue treatment for CHF and COPD. When medically/hemodynamically stable, will discharge to home.     Joe Ackerman, [facility]  Inpatient Hospital Medicine  [facility]

## 2024-08-14 NOTE — PLAN OF CARE
08/14/24 1049   Discharge Assessment   Assessment Type Discharge Planning Assessment   Confirmed/corrected address, phone number and insurance Yes   Confirmed Demographics Correct on Facesheet   Source of Information patient;health record   When was your last doctors appointment?   (Abiodun Jorje)   Reason For Admission Chest tightness, SOB, Hx of atrial fib, Chest pain, Hx of COPD, Acute heart failure   People in Home alone   Facility Arrived From: Home   Do you expect to return to your current living situation? Yes   Do you have help at home or someone to help you manage your care at home? Yes   Who are your caregiver(s) and their phone number(s)? Yesenia Reyes  Sister  [phone]    2  Nina olsen  Sister  [phone]   Walking or Climbing Stairs Difficulty yes   Mobility Management Cane   Dressing/Bathing Difficulty no   Home Accessibility wheelchair accessible   Home Layout Able to live on 1st floor   Equipment Currently Used at Home cane, straight   Readmission within 30 days? No   Patient currently being followed by outpatient case management? No   Do you currently have service(s) that help you manage your care at home? No   Do you take prescription medications? Yes  ([facility])   Do you have prescription coverage? Yes   Coverage [employer]; M/D   Do you have any problems affording any of your prescribed medications? No   Is the patient taking medications as prescribed? yes   Who is going to help you get home at discharge? Family   How do you get to doctors appointments? health plan transportation   Are you on dialysis? No   Discharge Plan A Home;Home Health   DME Needed Upon Discharge  none   Discharge Plan discussed with: Patient   Transition of Care Barriers None     Pt single with no children; Sisters, Yesenia Reyes ([phone]; [phone]) & Nina Olsen ([phone]) provide support/asst as needed; Pt independent with ADL's; Previously discharged from [facility] and stated  he is not interested in resuming HH services; Independent with ADL's; CM to follow for d/c planning needs.

## 2024-08-14 NOTE — NURSING
Dr. Higginbotham and Yesika notified telemetry called stating pt had a 8 beat PVC run that speed up to a 7 beat VTACH run. Strip is on the chart. Both verbalize understanding, will review, no orders at this time. Pt sitting up at BS, no complaints except he is cold. Cinthya BRAGG bedside nurse made aware.

## 2024-08-14 NOTE — PLAN OF CARE
Problem: Adult Inpatient Plan of Care  Goal: Plan of Care Review  8/14/2024 0525 by Cinthya Reyes RN  Outcome: Progressing  8/14/2024 0525 by Cinthya Reyes RN  Outcome: Progressing  Goal: Patient-Specific Goal (Individualized)  8/14/2024 0525 by Cinthya Reyes RN  Outcome: Progressing  8/14/2024 0525 by Cinthya Reyes RN  Outcome: Progressing  Goal: Absence of Hospital-Acquired Illness or Injury  8/14/2024 0525 by Cinthya Reyes RN  Outcome: Progressing  8/14/2024 0525 by Cinthya Reyes RN  Outcome: Progressing  Goal: Optimal Comfort and Wellbeing  8/14/2024 0525 by Cinthya Reyes RN  Outcome: Progressing  8/14/2024 0525 by Cinthya Reyes RN  Outcome: Progressing  Goal: Readiness for Transition of Care  8/14/2024 0525 by Cinthya Reyes RN  Outcome: Progressing  8/14/2024 0525 by Cinthya Reyes RN  Outcome: Progressing     Problem: Wound  Goal: Optimal Coping  8/14/2024 0525 by Cinthya Reyes RN  Outcome: Progressing  8/14/2024 0525 by Cinthya Reyes RN  Outcome: Progressing  Goal: Optimal Functional Ability  8/14/2024 0525 by Cinthya Reyes RN  Outcome: Progressing  8/14/2024 0525 by Cinthya Reyes RN  Outcome: Progressing  Goal: Absence of Infection Signs and Symptoms  8/14/2024 0525 by Cinthya Reyes RN  Outcome: Progressing  8/14/2024 0525 by Cinthya Reyes RN  Outcome: Progressing  Goal: Improved Oral Intake  8/14/2024 0525 by Cinthya Reyes RN  Outcome: Progressing  8/14/2024 0525 by Cinthya Reyes RN  Outcome: Progressing  Goal: Optimal Pain Control and Function  8/14/2024 0525 by Cinthya Reyes RN  Outcome: Progressing  8/14/2024 0525 by Cinthya Reyes RN  Outcome: Progressing  Goal: Skin Health and Integrity  8/14/2024 0525 by Cinthya Reyes RN  Outcome: Progressing  8/14/2024 0525 by Cinthya Reyes RN  Outcome: Progressing  Goal: Optimal Wound Healing  8/14/2024 0525 by Cinthya Reyes, RN  Outcome: Progressing  8/14/2024 0525 by Cinthya Reyes, RN  Outcome: Progressing     Problem: Heart Failure  Goal: Optimal Coping  Outcome:  Progressing  Goal: Optimal Cardiac Output  Outcome: Progressing  Goal: Stable Heart Rate and Rhythm  Outcome: Progressing  Goal: Optimal Functional Ability  Outcome: Progressing  Goal: Fluid and Electrolyte Balance  Outcome: Progressing  Goal: Improved Oral Intake  Outcome: Progressing  Goal: Effective Oxygenation and Ventilation  Outcome: Progressing  Goal: Effective Breathing Pattern During Sleep  Outcome: Progressing

## 2024-08-15 PROBLEM — Z87.09 HISTORY OF COPD: Status: ACTIVE | Noted: 2024-08-15

## 2024-08-15 LAB
ALBUMIN SERPL-MCNC: 3.3 G/DL (ref 3.4–4.8)
ALBUMIN/GLOB SERPL: 0.9 RATIO (ref 1.1–2)
ALP SERPL-CCNC: 151 UNIT/L (ref 40–150)
ALT SERPL-CCNC: 114 UNIT/L (ref 0–55)
ANION GAP SERPL CALC-SCNC: 11 MEQ/L
AST SERPL-CCNC: 168 UNIT/L (ref 5–34)
BASOPHILS # BLD AUTO: 0.03 X10(3)/MCL
BASOPHILS NFR BLD AUTO: 0.4 %
BILIRUB SERPL-MCNC: 2.8 MG/DL
BUN SERPL-MCNC: 25.7 MG/DL (ref 8.4–25.7)
CALCIUM SERPL-MCNC: 9 MG/DL (ref 8.8–10)
CHLORIDE SERPL-SCNC: 97 MMOL/L (ref 98–107)
CO2 SERPL-SCNC: 28 MMOL/L (ref 23–31)
CREAT SERPL-MCNC: 1.36 MG/DL (ref 0.73–1.18)
CREAT/UREA NIT SERPL: 19
EOSINOPHIL # BLD AUTO: 0.06 X10(3)/MCL (ref 0–0.9)
EOSINOPHIL NFR BLD AUTO: 0.8 %
ERYTHROCYTE [DISTWIDTH] IN BLOOD BY AUTOMATED COUNT: 18.3 % (ref 11.5–17)
GFR SERPLBLD CREATININE-BSD FMLA CKD-EPI: 57 ML/MIN/1.73/M2
GLOBULIN SER-MCNC: 3.6 GM/DL (ref 2.4–3.5)
GLUCOSE SERPL-MCNC: 99 MG/DL (ref 82–115)
HAV IGM SERPL QL IA: NONREACTIVE
HBV CORE IGM SERPL QL IA: NONREACTIVE
HBV SURFACE AG SERPL QL IA: NONREACTIVE
HCT VFR BLD AUTO: 35.3 % (ref 42–52)
HCV AB SERPL QL IA: NONREACTIVE
HGB BLD-MCNC: 11 G/DL (ref 14–18)
IMM GRANULOCYTES # BLD AUTO: 0.02 X10(3)/MCL (ref 0–0.04)
IMM GRANULOCYTES NFR BLD AUTO: 0.3 %
LYMPHOCYTES # BLD AUTO: 1.49 X10(3)/MCL (ref 0.6–4.6)
LYMPHOCYTES NFR BLD AUTO: 20.9 %
MAGNESIUM SERPL-MCNC: 1.9 MG/DL (ref 1.6–2.6)
MCH RBC QN AUTO: 25.7 PG (ref 27–31)
MCHC RBC AUTO-ENTMCNC: 31.2 G/DL (ref 33–36)
MCV RBC AUTO: 82.5 FL (ref 80–94)
MONOCYTES # BLD AUTO: 0.7 X10(3)/MCL (ref 0.1–1.3)
MONOCYTES NFR BLD AUTO: 9.8 %
NEUTROPHILS # BLD AUTO: 4.83 X10(3)/MCL (ref 2.1–9.2)
NEUTROPHILS NFR BLD AUTO: 67.8 %
NRBC BLD AUTO-RTO: 0 %
PLATELET # BLD AUTO: 209 X10(3)/MCL (ref 130–400)
PMV BLD AUTO: 10.2 FL (ref 7.4–10.4)
POCT GLUCOSE: 189 MG/DL (ref 70–110)
POTASSIUM SERPL-SCNC: 3.3 MMOL/L (ref 3.5–5.1)
PROT SERPL-MCNC: 6.9 GM/DL (ref 5.8–7.6)
RBC # BLD AUTO: 4.28 X10(6)/MCL (ref 4.7–6.1)
SODIUM SERPL-SCNC: 136 MMOL/L (ref 136–145)
WBC # BLD AUTO: 7.13 X10(3)/MCL (ref 4.5–11.5)

## 2024-08-15 PROCEDURE — 80053 COMPREHEN METABOLIC PANEL: CPT

## 2024-08-15 PROCEDURE — 94640 AIRWAY INHALATION TREATMENT: CPT

## 2024-08-15 PROCEDURE — 80074 ACUTE HEPATITIS PANEL: CPT

## 2024-08-15 PROCEDURE — 97116 GAIT TRAINING THERAPY: CPT

## 2024-08-15 PROCEDURE — 25000003 PHARM REV CODE 250

## 2024-08-15 PROCEDURE — 36415 COLL VENOUS BLD VENIPUNCTURE: CPT

## 2024-08-15 PROCEDURE — 83735 ASSAY OF MAGNESIUM: CPT

## 2024-08-15 PROCEDURE — 27000221 HC OXYGEN, UP TO 24 HOURS

## 2024-08-15 PROCEDURE — 94761 N-INVAS EAR/PLS OXIMETRY MLT: CPT

## 2024-08-15 PROCEDURE — 85025 COMPLETE CBC W/AUTO DIFF WBC: CPT

## 2024-08-15 PROCEDURE — 63600175 PHARM REV CODE 636 W HCPCS

## 2024-08-15 PROCEDURE — 21400001 HC TELEMETRY ROOM

## 2024-08-15 PROCEDURE — 25000242 PHARM REV CODE 250 ALT 637 W/ HCPCS

## 2024-08-15 RX ORDER — POTASSIUM CHLORIDE 20 MEQ/1
40 TABLET, EXTENDED RELEASE ORAL ONCE
Status: COMPLETED | OUTPATIENT
Start: 2024-08-15 | End: 2024-08-15

## 2024-08-15 RX ORDER — DIPHENHYDRAMINE HCL 25 MG
25 CAPSULE ORAL ONCE
Status: COMPLETED | OUTPATIENT
Start: 2024-08-16 | End: 2024-08-15

## 2024-08-15 RX ORDER — FUROSEMIDE 10 MG/ML
40 INJECTION INTRAMUSCULAR; INTRAVENOUS EVERY 12 HOURS
Status: DISCONTINUED | OUTPATIENT
Start: 2024-08-15 | End: 2024-08-15

## 2024-08-15 RX ORDER — FUROSEMIDE 10 MG/ML
40 INJECTION INTRAMUSCULAR; INTRAVENOUS EVERY 12 HOURS
Status: DISCONTINUED | OUTPATIENT
Start: 2024-08-16 | End: 2024-08-18 | Stop reason: HOSPADM

## 2024-08-15 RX ORDER — SPIRONOLACTONE 25 MG/1
50 TABLET ORAL DAILY
Status: DISCONTINUED | OUTPATIENT
Start: 2024-08-16 | End: 2024-08-18 | Stop reason: HOSPADM

## 2024-08-15 RX ADMIN — CETIRIZINE HYDROCHLORIDE 10 MG: 10 TABLET, FILM COATED ORAL at 08:08

## 2024-08-15 RX ADMIN — SACUBITRIL AND VALSARTAN 2 TABLET: 24; 26 TABLET, FILM COATED ORAL at 08:08

## 2024-08-15 RX ADMIN — FOLIC ACID 1 MG: 1 TABLET ORAL at 08:08

## 2024-08-15 RX ADMIN — MELATONIN TAB 3 MG 6 MG: 3 TAB at 08:08

## 2024-08-15 RX ADMIN — METOPROLOL SUCCINATE 12.5 MG: 25 TABLET, EXTENDED RELEASE ORAL at 08:08

## 2024-08-15 RX ADMIN — PANTOPRAZOLE SODIUM 40 MG: 40 TABLET, DELAYED RELEASE ORAL at 08:08

## 2024-08-15 RX ADMIN — IPRATROPIUM BROMIDE AND ALBUTEROL SULFATE 3 ML: .5; 3 SOLUTION RESPIRATORY (INHALATION) at 07:08

## 2024-08-15 RX ADMIN — RIVAROXABAN 20 MG: 10 TABLET, FILM COATED ORAL at 05:08

## 2024-08-15 RX ADMIN — FUROSEMIDE 60 MG: 10 INJECTION, SOLUTION INTRAMUSCULAR; INTRAVENOUS at 08:08

## 2024-08-15 RX ADMIN — DIPHENHYDRAMINE HYDROCHLORIDE 25 MG: 25 CAPSULE ORAL at 11:08

## 2024-08-15 RX ADMIN — POTASSIUM CHLORIDE 40 MEQ: 1500 TABLET, EXTENDED RELEASE ORAL at 08:08

## 2024-08-15 RX ADMIN — IPRATROPIUM BROMIDE AND ALBUTEROL SULFATE 3 ML: .5; 3 SOLUTION RESPIRATORY (INHALATION) at 11:08

## 2024-08-15 RX ADMIN — ATORVASTATIN CALCIUM 80 MG: 40 TABLET, FILM COATED ORAL at 08:08

## 2024-08-15 RX ADMIN — SPIRONOLACTONE 50 MG: 25 TABLET ORAL at 08:08

## 2024-08-15 RX ADMIN — ASPIRIN 81 MG: 81 TABLET, COATED ORAL at 08:08

## 2024-08-15 RX ADMIN — IPRATROPIUM BROMIDE AND ALBUTEROL SULFATE 3 ML: .5; 3 SOLUTION RESPIRATORY (INHALATION) at 03:08

## 2024-08-15 NOTE — PROGRESS NOTES
[facility] Medicine  Progress Note      Resident Team: [facility] Medicine List 2  Attending Physician: May Angulo MD        Subjective:      Brief HPI:  Ran Reyes Jr. is a 68 y.o. male with PMH of Afib on Xarelto, CHF, COPD on 2L home oxygen prn, CAD, HTN, mitral regurgitation, PAD who presented to [facility] ED on 8/13/2024 with primary complaints of cough, shortness of breath, and abdominal swelling x 2 days. Patient reports orthopnea and dyspnea worsening on exertion. He has needed to use his home oxygen more frequently the past two days. Patient also feels his abdomen is more distended and c/o epigastric burning pain, nausea and 1 episode of vomiting prior to ED arrival. Patient follows with [facility] cardiology. He reports a previous discussion of AICD placement, but this was postponed when he developed a scrotal abscess requiring hospitalization. Pt reports compliance with home Lasix 40mg BID, spironolactone 50mg qd, Toprol 6.25 qd, and Xarelto 20mg. Per chart review, patient is also prescribed Entresto, however he states he is unaware of this medication.      In the ED, vitals were stable, pt sating 99% on RA. Labs significant for hypokalemia (2.7); pt noncompliant with prescribed KCL supplement. BNP elevated at 2500. Troponin negative. CXR unremarkable. EKG showed afib with PVCs, rate 86 bpm. ST and T wave abnormality, unchanged from previous. He was given duoneb x1 in the ED with subjective improvement in shortness of breath. Medicine was consulted for CHF exacerbation and hypokalemia.     Interval History:   Patient appears to be doing better this am, up and out of bed walking. He had the episode of vomiting while we were examining him yesterday, but denies any continued nausea/vomiting. He reports a BM this morning. He denies CP, abdominal pains, fever, chills, calf tenderness. He reports barely using oxygen via NC since yesterday. Lipid panel, Hepatitis panel, and pancreatic enzymes were drawn  yesterday which were all unremarkable.     He did have some hypotension overnight, lowest SBP 77 and lowest DBP 49, that has subsequently returned to WNL this morning.    Review of Systems:  ROS completed and negative except as indicated above.     Objective:     Last 24 Hour Vital Signs:  BP  Min: 77/57  Max: 107/73  Temp  Av.9 °F (36.6 °C)  Min: 97.7 °F (36.5 °C)  Max: 98.2 °F (36.8 °C)  Pulse  Av.4  Min: 55  Max: 89  Resp  Av.5  Min: 18  Max: 24  SpO2  Av.8 %  Min: 92 %  Max: 100 %  Weight  Av.2 kg (203 lb 4.2 oz)  Min: 92.2 kg (203 lb 4.2 oz)  Max: 92.2 kg (203 lb 4.2 oz)  I/O last 3 completed shifts:  In: 960 [P.O.:960]  Out: 3550 [Urine:3550]    Physical Examination:  Physical Exam  Constitutional:       General: He is not in acute distress.  Eyes:      Extraocular Movements: Extraocular movements intact.      Conjunctiva/sclera: Conjunctivae normal.   Cardiovascular:      Rate and Rhythm: Normal rate. Rhythm irregular.      Pulses: Normal pulses.   Pulmonary:      Comments: Patient is on room air, appears to be breathing more comfortably today. No wheezes or crackles.   Abdominal:      General: There is no distension.      Palpations: Abdomen is soft.      Tenderness: There is no abdominal tenderness. There is no guarding.   Musculoskeletal:      Cervical back: Normal range of motion.      Right lower leg: No edema.      Left lower leg: No edema.   Skin:     General: Skin is warm and dry.   Neurological:      General: No focal deficit present.      Mental Status: He is alert.   Psychiatric:         Thought Content: Thought content normal.           Laboratory:  Most Recent Data:  CBC:   Lab Results   Component Value Date    WBC 7.13 08/15/2024    HGB 11.0 (L) 08/15/2024    HCT 35.3 (L) 08/15/2024     08/15/2024    MCV 82.5 08/15/2024    RDW 18.3 (H) 08/15/2024     WBC Differential:   Recent Labs   Lab 24  1133 24  0539 08/15/24  0419   WBC 6.24 6.77 7.13   HGB 10.5*  10.5* 11.0*   HCT 32.6* 33.0* 35.3*    215 209   MCV 81.3 82.3 82.5     BMP:   Lab Results   Component Value Date     08/15/2024    K 3.3 (L) 08/15/2024    CL 97 (L) 08/15/2024    CO2 28 08/15/2024    BUN 25.7 08/15/2024    CREATININE 1.36 (H) 08/15/2024    CALCIUM 9.0 08/15/2024    MG 1.90 08/15/2024    PHOS 2.7 04/28/2024     LFTs:   Lab Results   Component Value Date    ALBUMIN 3.3 (L) 08/15/2024    BILITOT 2.8 (H) 08/15/2024     (H) 08/15/2024    ALKPHOS 151 (H) 08/15/2024     (H) 08/15/2024     DM:   Lab Results   Component Value Date    HGBA1C 5.0 03/18/2024    HGBA1C 4.7 11/15/2021    HGBA1C 5.7 10/07/2020    CREATININE 1.36 (H) 08/15/2024     Cardiac:   Lab Results   Component Value Date    TROPONINI 0.030 08/13/2024    BNP 2,531.8 (H) 08/13/2024       Radiology:  Imaging Results              X-Ray Chest PA And Lateral (Final result)  Result time 08/13/24 12:55:38      Final result by Mickey West MD (08/13/24 12:55:38)                   Impression:      No acute cardiopulmonary process identified.      Electronically signed by: Mickey West  Date:    08/13/2024  Time:    12:55               Narrative:    EXAMINATION:  XR CHEST PA AND LATERAL    CLINICAL HISTORY:  Chest Pain;    TECHNIQUE:  Two-view    COMPARISON:  July 16, 2024.    FINDINGS:  Cardiopericardial silhouette enlarged appearance similar.  No dense focal or segmental consolidation, overt congestion, pleural effusion or pneumothorax.                                      Current Medications:     Infusions:       Scheduled:   albuterol-ipratropium  3 mL Nebulization Q4H WAKE    aspirin  81 mg Oral Daily    atorvastatin  80 mg Oral Daily    cetirizine  10 mg Oral Daily    folic acid  1 mg Oral Daily    furosemide (LASIX) injection  40 mg Intravenous Q12H    metoprolol succinate  12.5 mg Oral Daily    pantoprazole  40 mg Oral BID    rivaroxaban  20 mg Oral Daily    sacubitriL-valsartan  2 tablet Oral BID    [START ON  8/16/2024] spironolactone  50 mg Oral Daily        PRN:    Current Facility-Administered Medications:     acetaminophen, 650 mg, Oral, Q6H PRN    dextrose 10%, 12.5 g, Intravenous, PRN    dextrose 10%, 25 g, Intravenous, PRN    glucagon (human recombinant), 1 mg, Intramuscular, PRN    glucose, 16 g, Oral, PRN    glucose, 24 g, Oral, PRN    melatonin, 6 mg, Oral, Nightly PRN    naloxone, 0.02 mg, Intravenous, PRN    nicotine, 1 patch, Transdermal, Daily PRN    prochlorperazine, 5 mg, Intravenous, Q6H PRN    sodium chloride 0.9%, 10 mL, Intravenous, Q12H PRN    Assessment & Plan:     CHF Exacerbation  - CXR unremarkable  - Previous echo with EF of 50-55%. Repeat echo (8/14) with EF now 22%   - Strict I&Os and Daily weights  - Fluid restriction, low sodium diet  - Ethanol and UDS negative  - Continue home spironolactone 50 mg and metoprolol succinate 6.25mg daily  - Entresto 24mg/26mg twice daily restarted  - Per outpatient cardiology notes, AICD previously discussed  - Cardiology following.   - Soft BPs overnight. Will decrease Lasix back down to 40mg IV b.i.d.   - Will order nuclear stress test due to the decrease in ejection fraction      COPD on home 2L O2 prn  - Supplemental oxygen prn for goal SpO2 88-92%  - Duonebs q4hr for wheezing     Hypokalemia  - K: 2.7 on admission  - Continue to monitor, will replete as needed.      Transaminitis   - LFTs acutely elevated with no prior hx of hepatic disease  - Pt denies ETOH abuse. ETOH level and UDS negative on admission.   - Lipid panel, Amylase, Lipase unremarkable   - Hep panel nonreactive      Atrial fibrillation, rate controlled   - [id number] score 4   - Continue home Xarelto, ASA 81mg    - Continue home metoprolol succinate 6.25 mg daily   - On telemetry     Hx of cardiac arrest   - Has history of cardiac arrest requiring defibrillation x3 in March 2024. Pt was discharged on home midodrine    - BP stable. Hold home midodrine     HLD  - Continue home atorvastatin  80 mg daily  - Lipid panel: 96 total, HDL 32, LDL 49    GERD  - Continue home Protonix 40mg bid     Tobacco abuse  - 24 pack year smoking history  - Received a prescription for Chantix on the day of admission, but did not start it.   - Patient counseled on the benefits of quitting smoking as well as the risks to his current comorbidities  - Nicotine patches prn on board     Code Status: Full  Access: PIV  Antibiotics: None  Diet: Low sodium  DVT Prophylaxis: Xarelto  GI Prophylaxis: Protonix  Fluids: None     Disposition: Cardiology following. Plan for nuclear stress test. Discharge pending patient progress.         Jennifer Enamorado DO  [facility] HO-2

## 2024-08-15 NOTE — MEDICAL/APP STUDENT
[facility] - Inpatient Hospital Medicine  Progress Note    Admission Date: 8/13/2024  Code Status: Full Code   Attending Physician: May Angulo MD   Expected Discharge Date:   Principal Problem:Acute heart failure    Subjective:     Ran Reyes Jr. is a 68 y.o. male with PMH significant for atrial fibrillation on Xarelto, CHF, COPD on 2L home oxygen, CAD, HTN, mitral regurgitation, PAD who presented to [facility] ED on 8/13/2024 with primary complaints of cough, shortness of breath, and abdominal swelling x 2 days. States that he has been unable to lie flat due to increased shortness of breath and difficulty breathing. He has needed to use his home oxygen more frequently the past two days. He feels like his abdomen has been more swollen which has caused some discomfort and nausea. Reports one episode of vomiting this morning. Also having epigastric burning with deep inspiration. Has also had increased lower extremity swelling. States that he has been adherent to his daily medications.     In the ED, vitals upon arrival were T 98, GIOVANNI 133/86, , RR 18, SpO2 99% on RA. Labs significant for K 2.7, creatinine 1.23, , AST 81, BNP 2531.8. Troponin negative. CXR performed and showed no acute cardiopulmonary process. EKG showed atrial fibrillation with PVCs, rate 86 bpm. ST and T wave abnormality, unchanged from previous. He was given duoneb x1 in the ED with subjective improvement in shortness of breath. He was also given Lasix 40 mg IV and Kcl 40 mEq. Medicine was consulted for further management.    Interval History:   Patient was seen and examined this morning. Upon entering the room, he was walking around and stretching, using the wall for supported balance. He had the episode of vomiting while we were examining him yesterday, but denies any continued nausea/vomiting. He reports a BM this morning. He denies CP, abdominal pains, fever, chills, calf tenderness. He reports barely using oxygen via NC  since yesterday. Lipid panel, Hepatitis panel, and pancreatic enzymes were drawn yesterday which were all largely negative.     He did have some hypotension overnight, lowest SBP 77 and lowest DBP 49, that has subsequently returned to WNL this morning with no symptoms.     Objective:     Vital Signs (Most Recent):  Temp: 97.9 °F (36.6 °C) (08/15/24 0831)  Pulse: 87 (08/15/24 0831)  Resp: 20 (08/15/24 0711)  BP: 97/61 (08/15/24 0831)  SpO2: 98 % (08/15/24 0831) Vital Signs (24h Range):  Temp:  [97.7 °F (36.5 °C)-98.4 °F (36.9 °C)] 97.9 °F (36.6 °C)  Pulse:  [55-91] 87  Resp:  [18-21] 20  SpO2:  [93 %-100 %] 98 %  BP: ()/(49-94) 97/61     Weight: 92.2 kg (203 lb 4.2 oz)  Body mass index is 27.57 kg/m².    SpO2: 98 %       Physical Exam  Vitals reviewed.   General: Normal appearing  HEENT: NC/AT. MMM. EOMI. Scleral icterus present.   Cardio: Irregular rhythm, normal rate. No M,R,G.   Pulm: No wheexing, rhonchi, or rales present. Breath sounds improved in all lung fields.   Abdominal: Soft, nontender abdomen. No epigastric pain to palpation.   MSK: Normal ROM. Swelling of Bilat LE improved.   Neuro: A&Ox3. No focal deficit present.  Psych: Thought content, mood, and judgment are normal.    Assessment and Plan:     CHF Exacerbation  - ChestXR reports no acute cardiopulmonary process.   - Echo (8/14/24) shows EF reduced to 22% from previous echo EF of 50-55%.   - Strict I&Os and Daily weights  - Fluid restriction, low sodium diet  - Ethanol and UDS negative  - Continue Lasix 40mg IV b.i.d.   - Continue home spironolactone 50 mg and metoprolol succinate 12.5 mg daily  - Continue Entresto 24mg/26mg twice daily   - Nuclear Stress test ordered    COPD  On Home 2L O2, prn   - Duonebs scheduled q4hr while patient is awake to help with wheezing    - Continue to monitor     Hypokalemia  - K: 2.7 on admission  - Continue to monitor, will replete as needed.      Transaminitis   - LFTs acutely elevated with no prior hx of  hepatic disease  - Pt denies ETOH abuse. ETOH level and UDS negative on admission.   - Lipid panel shows low HDL and LDL  - Amylase, Lipase are normal  - Hepatitis panel was nonreactive     Atrial fibrillation, rate controlled   - [id number] score 4   - Continue home Xarelto, ASA 81mg    - Continue home metoprolol succinate 12.5mg daily   - On telemetry     Hx of cardiac arrest   - Has history of cardiac arrest requiring defibrillation x3 in March 2024. He was discharged on midodrine.   - BP stable; Hold home midodrine     HLD  - Continue home atorvastatin 80 mg daily  - Lipid panel: 96 total, HDL 32, LDL 49     GERD  - Continue home Protonix     Tobacco abuse  - 24 pack year smoking history  - Received a prescription for Chantix on the day of admission, but did not start it.   - Patient counseled on the benefits of quitting smoking as well as the risks to his current comorbidities  - Nicotine patches prn on board     Code Status: Full  Access: PIV  Antibiotics: None  Diet: Low sodium  DVT Prophylaxis: Xarelto  GI Prophylaxis: Protonix  Fluids: None     Disposition: Plan for nuclear stress test. When medically/hemodynamically stable, will discharge to home.      Joe Ackerman, [facility]  Inpatient Hospital Medicine  [facility]

## 2024-08-15 NOTE — PLAN OF CARE
Problem: Adult Inpatient Plan of Care  Goal: Plan of Care Review  Outcome: Progressing  Goal: Patient-Specific Goal (Individualized)  Outcome: Progressing  Goal: Absence of Hospital-Acquired Illness or Injury  Outcome: Progressing  Goal: Optimal Comfort and Wellbeing  Outcome: Progressing  Goal: Readiness for Transition of Care  Outcome: Progressing     Problem: Wound  Goal: Optimal Coping  Outcome: Progressing  Goal: Optimal Functional Ability  Outcome: Progressing  Goal: Absence of Infection Signs and Symptoms  Outcome: Progressing  Goal: Improved Oral Intake  Outcome: Progressing  Goal: Optimal Pain Control and Function  Outcome: Progressing  Goal: Skin Health and Integrity  Outcome: Progressing  Goal: Optimal Wound Healing  Outcome: Progressing     Problem: Heart Failure  Goal: Optimal Coping  Outcome: Progressing  Goal: Optimal Cardiac Output  Outcome: Progressing  Goal: Stable Heart Rate and Rhythm  Outcome: Progressing  Goal: Optimal Functional Ability  Outcome: Progressing  Goal: Fluid and Electrolyte Balance  Outcome: Progressing  Goal: Improved Oral Intake  Outcome: Progressing  Goal: Effective Oxygenation and Ventilation  Outcome: Progressing  Goal: Effective Breathing Pattern During Sleep  Outcome: Progressing

## 2024-08-15 NOTE — PT/OT/SLP PROGRESS
Physical Therapy Treatment    Patient Name:  Ran Reyes Jr.   MRN:  [id number]    Recommendations     Therapy Intensity Recommendations at Discharge: No Therapy Indicated  Discharge Equipment Recommendations: oxygen, cane, straight   Barriers to discharge: fall risk and decreased endurance    Assessment     Ran Reyes Jr. is a 68 y.o. male admitted with a medical diagnosis of Acute heart failure.  1. Acute heart failure, unspecified heart failure type    2. Chest pain    3. History of atrial fibrillation    4. History of COPD    5. Chest tightness    6. SOB (shortness of breath)       Patient Active Problem List   Diagnosis    Primary open angle glaucoma (POAG) of right eye, moderate stage    Combined forms of age-related cataract of left eye    Arteriosclerosis of coronary artery    Chronic atrial fibrillation    Congestive heart failure    Dyslipidemia    Hypertension    Tobacco use    PVD (peripheral vascular disease)    VHD (valvular heart disease)    COVID-19    HFrEF (heart failure with reduced ejection fraction)    Nonrheumatic mitral valve regurgitation    Postoperative eye state    Scrotal hematoma    Chronic obstructive pulmonary disease, unspecified    Lesion of external ear    Low back pain    Other thrombophilia    Secondary hyperaldosteronism    Positive colorectal cancer screening using Cologuard test    Acute heart failure    History of COPD      He presents with the following impairments/functional limitations:  impaired endurance, pain, gait instability, impaired cardiopulmonary response to activity, impaired functional mobility.    Rehab Prognosis: Good.    Patient would benefit from continued skilled acute PT services to: address above listed impairments/functional limitations; receive patient/caregiver education; reduce fall risk; and maximize independency/safety with functional mobility.    -continued: up-to-chair, ambulation, with progression of gait distance/frequency/duration and  speed, as tolerated/appropriate, with assistance and supervision    Recent Surgery: * No surgery found *      Plan     During this hospitalization, patient to be seen 5 x/week to address the identified impairments/functional limitations via gait training, therapeutic activities, therapeutic exercises and progress toward the established goals.    Plan of Care Expires:  09/11/24    Subjective     Communicated with patient's nurse Christianne during 1st gait session.    Patient agreeable to participate in treatment session.    Chief Complaint: pain  Patient/Family Comments/goals: home  Pain/Comfort:  Pain Rating 1: 7/10  Location - Side 1: Bilateral  Location - Orientation 1: lower  Location 1: back  Pain Addressed 1: Nurse notified, Cessation of Activity  Pain Rating Post-Intervention 1: 7/10    Objective     Patient found  standing at doorway of room  with peripheral IV, telemetry  upon PT entry to room.    General Precautions: Standard, fall   Orthopedic Precautions:N/A   Braces: N/A  Respiratory Status: room air  SAT O2 Check: n/a    Functional Mobility:    Bed Mobility:  Standing at doorway of room at start of session and returned to chair at end of session    Transfers:  Stand to Sit: modified independence with no assistive device  with no cues required    Gait:  Patient ambulated 200ft  Standing pause x3 minutes  Patient ambulated 300ft with no assistive device and supervision.  Patient demonstrates :       steady gait       no loss of balance       no mis-steps       decreased susie       decreased step length       decreased arm swing.    Other Mobility:  not assessed    Balance:  Sit  Patient demonstrated static balance on level surface with independence with no verbal cues.  Patient demonstrated dynamic balance on level surface with independence with no verbal cues during maximal excursions.  Stand  Patient demonstrated static balance on level surface  using no device with modified independence with no verbal  cues.    Patient left sitting in chair with peripheral IV, telemetry with all lines intact, call button in reach, tray table at bedside, patient's nurse notified, and patients nurse Christianne present.    Education     Patient educated on and assisted with functional mobility as noted above.  Patient educated on PT Plan of Care  Patient was instructed to utilize staff assistance for mobility/transfers.  White board updated regarding patient's safest level of mobility with staff assistance.    Goals     Multidisciplinary Problems       Physical Therapy Goals       Problem: Physical Therapy    Goal Priority Disciplines Outcome Goal Variances Interventions   Physical Therapy Goal     PT, PT/OT Progressing     Description: REVIEWED 08/15/2024  Goals to be met by: DISCHARGE  Patient will increase functional independence with mobility by performing:  -. Supine to sit with Kathleen - ONGOING  -. Sit to supine with Kathleen - ONGOING  -. Rolling to Left and Right with Kathleen - ONGOING  -. Sit to stand transfer with Modified Kathleen - PARTIALLY MET (stand to sit) 08/15/2024  -. Gait  x 260 feet with Supervision using Single-point Cane - MET 08/15/2024           Time Tracking     PT Received On: 08/15/24  PT Start Time: 0840     PT Stop Time: 0849  PT Total Time (min): 9 min     Billable Minutes: Gait Training 9  Non-Billable Minutes: n/a  08/15/2024

## 2024-08-16 LAB
ALBUMIN SERPL-MCNC: 3.1 G/DL (ref 3.4–4.8)
ALBUMIN/GLOB SERPL: 0.9 RATIO (ref 1.1–2)
ALP SERPL-CCNC: 136 UNIT/L (ref 40–150)
ALT SERPL-CCNC: 109 UNIT/L (ref 0–55)
ANION GAP SERPL CALC-SCNC: 9 MEQ/L
AST SERPL-CCNC: 122 UNIT/L (ref 5–34)
BASOPHILS # BLD AUTO: 0.03 X10(3)/MCL
BASOPHILS NFR BLD AUTO: 0.4 %
BILIRUB SERPL-MCNC: 1.6 MG/DL
BUN SERPL-MCNC: 33.9 MG/DL (ref 8.4–25.7)
CALCIUM SERPL-MCNC: 10 MG/DL (ref 8.8–10)
CHLORIDE SERPL-SCNC: 100 MMOL/L (ref 98–107)
CO2 SERPL-SCNC: 27 MMOL/L (ref 23–31)
CREAT SERPL-MCNC: 1.17 MG/DL (ref 0.73–1.18)
CREAT/UREA NIT SERPL: 29
EOSINOPHIL # BLD AUTO: 0.07 X10(3)/MCL (ref 0–0.9)
EOSINOPHIL NFR BLD AUTO: 1 %
ERYTHROCYTE [DISTWIDTH] IN BLOOD BY AUTOMATED COUNT: 18.3 % (ref 11.5–17)
GFR SERPLBLD CREATININE-BSD FMLA CKD-EPI: >60 ML/MIN/1.73/M2
GLOBULIN SER-MCNC: 3.6 GM/DL (ref 2.4–3.5)
GLUCOSE SERPL-MCNC: 100 MG/DL (ref 82–115)
HCT VFR BLD AUTO: 36 % (ref 42–52)
HGB BLD-MCNC: 11.3 G/DL (ref 14–18)
IMM GRANULOCYTES # BLD AUTO: 0.03 X10(3)/MCL (ref 0–0.04)
IMM GRANULOCYTES NFR BLD AUTO: 0.4 %
LYMPHOCYTES # BLD AUTO: 1.5 X10(3)/MCL (ref 0.6–4.6)
LYMPHOCYTES NFR BLD AUTO: 22.2 %
MAGNESIUM SERPL-MCNC: 2 MG/DL (ref 1.6–2.6)
MCH RBC QN AUTO: 26.1 PG (ref 27–31)
MCHC RBC AUTO-ENTMCNC: 31.4 G/DL (ref 33–36)
MCV RBC AUTO: 83.1 FL (ref 80–94)
MONOCYTES # BLD AUTO: 0.66 X10(3)/MCL (ref 0.1–1.3)
MONOCYTES NFR BLD AUTO: 9.7 %
NEUTROPHILS # BLD AUTO: 4.48 X10(3)/MCL (ref 2.1–9.2)
NEUTROPHILS NFR BLD AUTO: 66.3 %
NRBC BLD AUTO-RTO: 0 %
PLATELET # BLD AUTO: 234 X10(3)/MCL (ref 130–400)
PMV BLD AUTO: 9.9 FL (ref 7.4–10.4)
POCT GLUCOSE: 246 MG/DL (ref 70–110)
POTASSIUM SERPL-SCNC: 4.9 MMOL/L (ref 3.5–5.1)
PROT SERPL-MCNC: 6.7 GM/DL (ref 5.8–7.6)
RBC # BLD AUTO: 4.33 X10(6)/MCL (ref 4.7–6.1)
SODIUM SERPL-SCNC: 136 MMOL/L (ref 136–145)
WBC # BLD AUTO: 6.77 X10(3)/MCL (ref 4.5–11.5)

## 2024-08-16 PROCEDURE — 80053 COMPREHEN METABOLIC PANEL: CPT

## 2024-08-16 PROCEDURE — 36415 COLL VENOUS BLD VENIPUNCTURE: CPT

## 2024-08-16 PROCEDURE — 85025 COMPLETE CBC W/AUTO DIFF WBC: CPT

## 2024-08-16 PROCEDURE — 25000003 PHARM REV CODE 250

## 2024-08-16 PROCEDURE — 83735 ASSAY OF MAGNESIUM: CPT

## 2024-08-16 PROCEDURE — 21400001 HC TELEMETRY ROOM

## 2024-08-16 PROCEDURE — 94761 N-INVAS EAR/PLS OXIMETRY MLT: CPT

## 2024-08-16 PROCEDURE — 25000242 PHARM REV CODE 250 ALT 637 W/ HCPCS

## 2024-08-16 PROCEDURE — 94640 AIRWAY INHALATION TREATMENT: CPT

## 2024-08-16 PROCEDURE — 97116 GAIT TRAINING THERAPY: CPT

## 2024-08-16 PROCEDURE — 63600175 PHARM REV CODE 636 W HCPCS

## 2024-08-16 RX ADMIN — FOLIC ACID 1 MG: 1 TABLET ORAL at 09:08

## 2024-08-16 RX ADMIN — ACETAMINOPHEN 650 MG: 325 TABLET, FILM COATED ORAL at 09:08

## 2024-08-16 RX ADMIN — RIVAROXABAN 20 MG: 10 TABLET, FILM COATED ORAL at 04:08

## 2024-08-16 RX ADMIN — IPRATROPIUM BROMIDE AND ALBUTEROL SULFATE 3 ML: .5; 3 SOLUTION RESPIRATORY (INHALATION) at 02:08

## 2024-08-16 RX ADMIN — SACUBITRIL AND VALSARTAN 2 TABLET: 24; 26 TABLET, FILM COATED ORAL at 09:08

## 2024-08-16 RX ADMIN — IPRATROPIUM BROMIDE AND ALBUTEROL SULFATE 3 ML: .5; 3 SOLUTION RESPIRATORY (INHALATION) at 07:08

## 2024-08-16 RX ADMIN — FUROSEMIDE 40 MG: 10 INJECTION, SOLUTION INTRAMUSCULAR; INTRAVENOUS at 09:08

## 2024-08-16 RX ADMIN — ASPIRIN 81 MG: 81 TABLET, COATED ORAL at 09:08

## 2024-08-16 RX ADMIN — MELATONIN TAB 3 MG 6 MG: 3 TAB at 09:08

## 2024-08-16 RX ADMIN — METHYLPREDNISOLONE SODIUM SUCCINATE 80 MG: 40 INJECTION, POWDER, FOR SOLUTION INTRAMUSCULAR; INTRAVENOUS at 10:08

## 2024-08-16 RX ADMIN — PANTOPRAZOLE SODIUM 40 MG: 40 TABLET, DELAYED RELEASE ORAL at 09:08

## 2024-08-16 RX ADMIN — IPRATROPIUM BROMIDE AND ALBUTEROL SULFATE 3 ML: .5; 3 SOLUTION RESPIRATORY (INHALATION) at 11:08

## 2024-08-16 RX ADMIN — ATORVASTATIN CALCIUM 80 MG: 40 TABLET, FILM COATED ORAL at 09:08

## 2024-08-16 NOTE — CONSULTS
"Cardiology Consult Note     Admitting Team:   Attending Physician: May Angulo MD  Cardiology Attending Physician: Aung Verduzco MD    Date of Admit: 8/13/2024    Reason for Consult: Chest Pain and Cough (Chest pain, sob, cough, and abdominal swelling since yesterday. Rates pain 4/10 at this time. Afebrile. Vss. 12 lead EKG obtained.)       Subjective:      History of Present Illness:  Ran Reyes Jr. is a 68 y.o. male with PMHx of Afib on Xarelto, CHF, COPD on 2L home oxygen prn, CAD, HTN, mitral regurgitation, PAD who presented to [facility] ED on 8/13/2024 with primary complaints of cough, shortness of breath, and abdominal swelling x 2-3 days. Pt reports orthopnea and dyspnea which is worse on exertion. He states his abdomen feels swollen and "hard."  He states that he was having midsternal chest pain that feels like a band across his chest which moved up to shoulder/ throat. It is episodic 1-2 hrs mainly with laying down. Pt is not compliant with his medications. He states he was not taking his metoprolol for days. Pt states he has lower extremity edema. He states he was admitted to the hospital recently, and at that time he coded, and was in a coma for 15 days. Current echo showed a decreased EF of 22% compared to the last echo of 50-55%.     In the ED, pt was hypokalemic, BNP was 2531.8, and troponin was negative. CXR was unremarkable. EKG showed afib with PVCs.     Interval Events:  Pt is not having any symptoms. No chest pain, palpitations, or SOB. Pt states abdominal swelling has decreased.   Pt was having low systolic Bps throughout the day; some in the 80s. Pt was feeling dizzy during those times.    History obtained by patient interview and supplemented by nursing documentation and chart review.     PMHx:   has a past medical history of A-fib, Anticoagulant long-term use, Aortic aneurysm, Cataract, CHF (congestive heart failure), Chronic atrial fibrillation, COPD (chronic obstructive pulmonary " disease), Coronary artery disease, HLD (hyperlipidemia), Hypertension, Mitral regurgitation, PAD (peripheral artery disease), and Primary open angle glaucoma (POAG).   SurgHx:   has a past surgical history that includes Heart Stent (N/A); Insertion of stent into peripheral vessel (Right, 10/17/2022); Atherectomy of peripheral vessel (Left, 09/12/2022); Cataract extraction w/  intraocular lens implant (Left, 3/9/2023); Incision and drainage (N/A, 3/18/2024); Orchiectomy (Left, 3/18/2024); Esophagogastroduodenoscopy (N/A, 3/22/2024); egd, with closed biopsy (3/22/2024); and ANGIOGRAM, CORONARY, WITH LEFT HEART CATHETERIZATION (N/A, 3/26/2024).   FamHx:  family history includes Cancer in his mother; Heart failure in his father; Hypertension in his father and mother; No Known Problems in his sister; Stroke in his father.   SocialHx:   reports that he has been smoking cigarettes. He has a 10 pack-year smoking history. He has been exposed to tobacco smoke. He has never used smokeless tobacco. He reports current alcohol use of about 3.0 standard drinks of alcohol per week. He reports that he does not currently use drugs after having used the following drugs: Marijuana. Frequency: 1.00 time per week.  Allergies: No Known Allergies  Home Meds:     Current Outpatient Medications   Medication Instructions    aspirin (ECOTRIN) 81 mg, Oral, Daily    atorvastatin (LIPITOR) 80 mg, Oral, Daily    cetirizine (ZYRTEC) 10 mg, Oral, Daily    folic acid (FOLVITE) 1 mg, Oral, Daily    furosemide (LASIX) 40 mg, Oral, 2 times daily    hydrOXYzine HCL (ATARAX) 50 mg, Oral, 2 times daily PRN    metoprolol succinate (TOPROL-XL) 12.5 mg, Oral, Daily    midodrine (PROAMATINE) 10 mg, Oral, 3 times daily with meals    pantoprazole (PROTONIX) 40 mg, Oral, 2 times daily    potassium chloride SA (K-DUR,KLOR-CON) 20 MEQ tablet 20 mEq, Oral, 2 times daily    rivaroxaban (XARELTO) 20 mg, Oral, Daily    spironolactone (ALDACTONE) 50 mg, Oral, Daily     triamcinolone acetonide 0.1% (KENALOG) 0.1 % cream Daily    varenicline (CHANTIX STARTING MONTH BOX) 0.5 mg (11)- 1 mg (42) tablet Take one 0.5mg tab by mouth once daily X3 days,then increase to one 0.5mg tab twice daily X4 days,then increase to one 1mg tab twice daily       Review of Systems: All systems have been reviewed and are negative unless otherwise noted in HPI.     Objective:   Last 24 Hour Vital Signs:  BP  Min: 77/57  Max: 97/61  Temp  Av.9 °F (36.6 °C)  Min: 97.8 °F (36.6 °C)  Max: 98.2 °F (36.8 °C)  Pulse  Av.6  Min: 54  Max: 88  Resp  Av.4  Min: 18  Max: 24  SpO2  Av.2 %  Min: 92 %  Max: 100 %  Weight  Av.2 kg (203 lb 4.2 oz)  Min: 92.2 kg (203 lb 4.2 oz)  Max: 92.2 kg (203 lb 4.2 oz)  Body mass index is 27.57 kg/m².  I/O last 3 completed shifts:  In: 480 [P.O.:480]  Out: 3400 [Urine:3400]  Physical Examination:  Nursing note and vital signs reviewed.   Constitutional: NAD, not ill-appearing  HEENT: NCAT, PERRLA, normal conjunctivae  Cardiovascular: RRR, no murmurs noted, no chest tenderness to palpation, normal pulses in radial & dorsalis pedis bilaterally   Pulmonary: normal respiratory effort, CTAB, no crackles, wheezes  Abdominal: S/NT/ND  Musculoskeletal: No edema noted to bilateral lower extremities      Neurological: Alert & oriented to self, location, time and situation. At baseline neurological function.  Skin: warm & dry. Capillary refill < 2 seconds.     Laboratory:  Recent Labs   Lab 24  1133 24  1704 24  0539 08/15/24  0419   WBC 6.24  --  6.77 7.13   HGB 10.5*  --  10.5* 11.0*   HCT 32.6*  --  33.0* 35.3*     --  215 209   MCV 81.3  --  82.3 82.5   RDW 17.8*  --  17.9* 18.3*    141 138 136   K 2.7* 2.8* 3.5 3.3*    101 99 97*   CO2 24 24 24 28   BUN 16.2 15.2 19.0 25.7   CREATININE 1.23* 1.08 1.33* 1.36*   ALBUMIN 3.5  --  3.4 3.3*   BILITOT 2.3*  --  3.7* 2.8*   AST 81*  --  158* 168*   ALKPHOS 151*  --  153* 151*   ALT 53   --  93* 114*     Cardiac Data:  Recent Labs   Lab 08/13/24  1133 08/13/24  1438   TROPONINI 0.025 0.030   BNP 2,531.8*  --        Other Results:  EKG (my interpretation):     TTE:   Results for orders placed during the hospital encounter of 03/17/24    Echo    Interpretation Summary    Left Ventricle: The left ventricle is normal in size. Moderately increased wall thickness. There is low normal systolic function with a visually estimated ejection fraction of 50 - 55%. There is diastolic dysfunction.    Mitral Valve: There is moderate regurgitation.      Stress Testing:   No results found for this or any previous visit.       Coronary Angiogram:   Results for orders placed during the hospital encounter of 03/17/24    Cardiac catheterization    Conclusion  The procedure log was documented by No documenter listed and verified by Adriano Montiel MD.    Procedure:  Selective coronary angiography  Moderate (Conscious) Sedation      Indication: VT arrest, known cardiomyopathy, NSTEMI  Consent: The patient was brought to the cardiac catheterization lab. Was instructed and explained about the risk, benefit and alternatives of the procedure included but not limited to sudden cardiac death, myocardial infarction, bleeding, vascular injury, renal failure, stroke, contrast allergy, risk of conscious sedation and need for emergent bypass surgery.  the patient was agreeable to proceed.  Signed the consent form.  Access:  The patient was prepped using the usual sterile fashion.  Right radial artery  was accessed with micropuncture technique, ultrasound guidance.  An image of the ultrasound was put in the paper chart for purpose of documentation.  Sheath size:6F    Kirill test:  Verified with pulse oximetry deemed to be adequate for radial artery procedure.    Diagnostic catheters : TIG, JL 3.5    Coronary findings:    Dominance: right  Left main:  10-20% calcified distal left main disease  Left anterior descending artery:  50%  calcified proximal stenosis  Circumflex artery:  Luminal irregularities  Right coronary artery:  Luminal irregularities    Access Closure:  At the end of the procedure the sheath was removed. A TR band applied to the right radial artery . Excellent hemostasis achieved.      Radiology:  X-Ray Chest PA And Lateral   Final Result      No acute cardiopulmonary process identified.         Electronically signed by: Mickey West   Date:    08/13/2024   Time:    12:55          Current Medications   albuterol-ipratropium  3 mL Nebulization Q4H WAKE    aspirin  81 mg Oral Daily    atorvastatin  80 mg Oral Daily    cetirizine  10 mg Oral Daily    folic acid  1 mg Oral Daily    furosemide (LASIX) injection  40 mg Intravenous Q12H    metoprolol succinate  12.5 mg Oral Daily    pantoprazole  40 mg Oral BID    rivaroxaban  20 mg Oral Daily    sacubitriL-valsartan  2 tablet Oral BID    [START ON 8/16/2024] spironolactone  50 mg Oral Daily           Current Facility-Administered Medications:     acetaminophen, 650 mg, Oral, Q6H PRN    dextrose 10%, 12.5 g, Intravenous, PRN    dextrose 10%, 25 g, Intravenous, PRN    glucagon (human recombinant), 1 mg, Intramuscular, PRN    glucose, 16 g, Oral, PRN    glucose, 24 g, Oral, PRN    melatonin, 6 mg, Oral, Nightly PRN    naloxone, 0.02 mg, Intravenous, PRN    nicotine, 1 patch, Transdermal, Daily PRN    prochlorperazine, 5 mg, Intravenous, Q6H PRN    sodium chloride 0.9%, 10 mL, Intravenous, Q12H PRN     Assessment:     Ran Reyes JrLogan is a 68 y.o. male with a PMHx  Afib on Xarelto, CHF, COPD on 2L home oxygen prn, CAD, HTN, mitral regurgitation, PAD  presenting with CHF exacerbation.    Active Hospital Problems    Diagnosis    *Acute heart failure    History of COPD        Plan:     Acute CHF exacerbation   SOB  HFrEF (22%)  ELIZABETH secondary to Fluid Overload   Echo on 3/28 showed EF 50-55% with diastolic dysfunction  Echo today showed EF of 22%  BNP 2531  Will begin GDMT  therapy  Recommend holding night time dose of Lasix.  Continue metoprolol 6.25mg, sacubitril-valsartan 48-52mg, Spironolactone and Entresto but monitor because of the creatinine   Will plan Jardiance at a later time.  Will give more potassium.  Continue to monitor electrolytes.  NPO at midnight for stress test tomorrow.  Strict Is&Os with cardiac diet    We will continue to follow with you.    Sydnie Guzman, DO  [facility] IM resident PGY-1

## 2024-08-16 NOTE — MEDICAL/APP STUDENT
[facility] - Inpatient [facility] Medicine  Progress Note       Admission Date: 8/13/2024  Code Status: Full Code   Attending Physician: May Angulo MD   Expected Discharge Date:   Principal Problem:Acute heart failure    Subjective:   Ran Reyes Jr. is a 68 y.o. male with PMH significant for atrial fibrillation on Xarelto, CHF, COPD on 2L home oxygen, CAD, HTN, mitral regurgitation, PAD who presented to [facility] ED on 8/13/2024 with primary complaints of cough, shortness of breath, and abdominal swelling x 2 days. States that he has been unable to lie flat due to increased shortness of breath and difficulty breathing. He has needed to use his home oxygen more frequently the past two days. He feels like his abdomen has been more swollen which has caused some discomfort and nausea. Reports one episode of vomiting this morning. Also having epigastric burning with deep inspiration. Has also had increased lower extremity swelling. States that he has been adherent to his daily medications.     In the ED, vitals upon arrival were T 98, GIOVANNI 133/86, , RR 18, SpO2 99% on RA. Labs significant for K 2.7, creatinine 1.23, , AST 81, BNP 2531.8. Troponin negative. CXR performed and showed no acute cardiopulmonary process. EKG showed atrial fibrillation with PVCs, rate 86 bpm. ST and T wave abnormality, unchanged from previous. He was given duoneb x1 in the ED with subjective improvement in shortness of breath. He was also given Lasix 40 mg IV and Kcl 40 mEq. Medicine was consulted for further management.    Interval History:   Patient was seen and examined while sitting up in the bedside chair. He was anticipating his nuclear stress test today and has been NPO since midnight. He reports being hungry but no other complaints. He is a little bit more wheezy in the bilateral upper lung fields. He denies CP, SOB, abdominal pains, fever, chills.     Objective:     Vital Signs (Most Recent):  Temp: 97.8 °F (36.6 °C)  (08/16/24 0734)  Pulse: 89 (08/16/24 1101)  Resp: 20 (08/16/24 1101)  BP: (!) 81/53 (08/16/24 1050)  SpO2: 97 % (08/16/24 1101) Vital Signs (24h Range):  Temp:  [97 °F (36.1 °C)-97.9 °F (36.6 °C)] 97.8 °F (36.6 °C)  Pulse:  [] 89  Resp:  [18-20] 20  SpO2:  [96 %-100 %] 97 %  BP: (74-97)/(51-83) 81/53     Weight: 92.2 kg (203 lb 4.2 oz)  Body mass index is 27.57 kg/m².    SpO2: 97 %       Physical Exam  Vitals reviewed.   General: Normal appearing  HEENT: NC/AT. MMM. EOMI. Scleral icterus present.   Cardio: Irregular rhythm, normal rate. No M,R,G.   Pulm: Inspiratory and expiratory wheezing in bilateral upper lung fields. No rhonchi or rales present. Breath sounds in all lung fields.   Abdominal: Soft, nontender abdomen. No epigastric pain to palpation.   MSK: Normal ROM. No swelling noted in lower extremities.   Neuro: A&Ox3. No focal deficit present.  Psych: Thought content, mood, and judgment are normal.      Assessment and Plan:     CHF Exacerbation  - ChestXR reports no acute cardiopulmonary process.   - Echo (8/14/24) shows EF reduced to 22% from previous echo EF of 50-55%.   - Strict I&Os and Daily weights  - Fluid restriction, low sodium diet  - Ethanol and UDS negative  - Continue Lasix 40mg IV b.i.d.   - Continue home spironolactone 50 mg and metoprolol succinate 12.5 mg daily  - Continue Entresto 24mg/26mg twice daily   - Nuclear Stress test planned for today     COPD  On Home 2L O2, prn   - Duonebs scheduled q4hr while patient is awake to help with wheezing    - Solumedrol 80mg IV given    - Continue to monitor     Hypokalemia  - K: 2.7 on admission  - Continue to monitor, will replete as needed.      Transaminitis   - LFTs acutely elevated with no prior hx of hepatic disease  - Pt denies ETOH abuse. ETOH level and UDS negative on admission.   - Lipid panel shows low HDL and LDL  - Amylase, Lipase are normal  - Hepatitis panel was nonreactive     Atrial fibrillation, rate controlled   - [id number]  score 4   - Continue home Xarelto, ASA 81mg    - Continue home metoprolol succinate 12.5mg daily   - On telemetry     Hx of cardiac arrest   - Has history of cardiac arrest requiring defibrillation x3 in March 2024. He was discharged on midodrine.   - BP stable; Hold home midodrine     HLD  - Continue home atorvastatin 80 mg daily  - Lipid panel: 96 total, HDL 32, LDL 49     GERD  - Continue home Protonix     Tobacco abuse  - 24 pack year smoking history  - Received a prescription for Chantix on the day of admission, but did not start it.   - Patient counseled on the benefits of quitting smoking as well as the risks to his current comorbidities  - Nicotine patches prn on board     Code Status: Full  Access: PIV  Antibiotics: None  Diet: Low sodium  DVT Prophylaxis: Xarelto  GI Prophylaxis: Protonix  Fluids: None     Disposition: Steroids for wheezing. Nuclear stress test today.      Joe Ackerman, [facility] [place] [facility]  Inpatient Hospital Medicine  [facility]

## 2024-08-16 NOTE — NURSING
Dr. Napoles notified Telemetry just called and stated pt had a 3 beat ventricular pace run with HR in 150s. Pt sitting up in chair, denies any symptoms. States day team arriving will notify them.   Myesha Bedside nurse was also made aware with verbal understanding.

## 2024-08-16 NOTE — PROGRESS NOTES
[facility] - Hospital Medicine  Progress Note      Resident Team: [facility] Medicine List 2  Attending Physician: May Angulo MD        Subjective:      Brief HPI:  Ran Reyes Jr. is a 68 y.o. male with PMH of Afib on Xarelto, CHF, COPD on 2L home oxygen prn, CAD, HTN, mitral regurgitation, PAD who presented to [facility] ED on 2024 with primary complaints of cough, shortness of breath, and abdominal swelling x 2 days. Patient reports orthopnea and dyspnea worsening on exertion. He has needed to use his home oxygen more frequently the past two days. Patient also feels his abdomen is more distended and c/o epigastric burning pain, nausea and 1 episode of vomiting prior to ED arrival. Patient follows with [facility] cardiology. He reports a previous discussion of AICD placement, but this was postponed when he developed a scrotal abscess requiring hospitalization. Pt reports compliance with home Lasix 40mg BID, spironolactone 50mg qd, Toprol 6.25 qd, and Xarelto 20mg. Per chart review, patient is also prescribed Entresto, however he states he is unaware of this medication.      In the ED, vitals were stable, pt sating 99% on RA. Labs significant for hypokalemia (2.7); pt noncompliant with prescribed KCL supplement. BNP elevated at 2500. Troponin negative. CXR unremarkable. EKG showed afib with PVCs, rate 86 bpm. ST and T wave abnormality, unchanged from previous. He was given duoneb x1 in the ED with subjective improvement in shortness of breath. Medicine was consulted for CHF exacerbation and hypokalemia.     Interval History:   NAEON. VSS. Patient currently NPO for stress test.     Review of Systems:  ROS completed and negative except as indicated above.     Objective:     Last 24 Hour Vital Signs:  BP  Min: 74/51  Max: 97/63  Temp  Av.6 °F (36.4 °C)  Min: 97 °F (36.1 °C)  Max: 97.8 °F (36.6 °C)  Pulse  Av.7  Min: 54  Max: 110  Resp  Av.3  Min: 18  Max: 20  SpO2  Av.3 %  Min: 96 %  Max: 100  %  Height  Av' (182.9 cm)  Min: 6' (182.9 cm)  Max: 6' (182.9 cm)  Weight  Av.2 kg (203 lb 4.2 oz)  Min: 92.2 kg (203 lb 4.2 oz)  Max: 92.2 kg (203 lb 4.2 oz)  I/O last 3 completed shifts:  In: -   Out: 3250 [Urine:3250]    Physical Examination:  Physical Exam  Constitutional:       General: He is not in acute distress.  Eyes:      Extraocular Movements: Extraocular movements intact.      Conjunctiva/sclera: Conjunctivae normal.   Cardiovascular:      Rate and Rhythm: Normal rate and regular rhythm.      Pulses: Normal pulses.   Pulmonary:      Effort: Pulmonary effort is normal.      Comments: Improvement of breath sounds from admission. Normal WOB. Mild wheezes bilaterally, minimal crackles.   Abdominal:      General: There is no distension.      Palpations: Abdomen is soft.      Tenderness: There is no abdominal tenderness. There is no guarding.   Musculoskeletal:      Cervical back: Normal range of motion.      Right lower leg: No edema.      Left lower leg: No edema.   Skin:     General: Skin is warm and dry.   Neurological:      General: No focal deficit present.      Mental Status: He is alert.   Psychiatric:         Thought Content: Thought content normal.           Laboratory:  Most Recent Data:  CBC:   Lab Results   Component Value Date    WBC 6.77 2024    HGB 11.3 (L) 2024    HCT 36.0 (L) 2024     2024    MCV 83.1 2024    RDW 18.3 (H) 2024     WBC Differential:   Recent Labs   Lab 24  1133 24  0539 08/15/24  0419 24  0346   WBC 6.24 6.77 7.13 6.77   HGB 10.5* 10.5* 11.0* 11.3*   HCT 32.6* 33.0* 35.3* 36.0*    215 209 234   MCV 81.3 82.3 82.5 83.1     BMP:   Lab Results   Component Value Date     2024    K 4.9 2024     2024    CO2 27 2024    BUN 33.9 (H) 2024    CREATININE 1.17 2024    CALCIUM 10.0 2024    MG 2.00 2024    PHOS 2.7 2024     LFTs:   Lab Results    Component Value Date    ALBUMIN 3.1 (L) 08/16/2024    BILITOT 1.6 (H) 08/16/2024     (H) 08/16/2024    ALKPHOS 136 08/16/2024     (H) 08/16/2024     DM:   Lab Results   Component Value Date    HGBA1C 5.0 03/18/2024    HGBA1C 4.7 11/15/2021    HGBA1C 5.7 10/07/2020    CREATININE 1.17 08/16/2024     Cardiac:   Lab Results   Component Value Date    TROPONINI 0.030 08/13/2024    BNP 2,531.8 (H) 08/13/2024       Radiology:  Imaging Results              X-Ray Chest PA And Lateral (Final result)  Result time 08/13/24 12:55:38      Final result by Mickey West MD (08/13/24 12:55:38)                   Impression:      No acute cardiopulmonary process identified.      Electronically signed by: Mickey West  Date:    08/13/2024  Time:    12:55               Narrative:    EXAMINATION:  XR CHEST PA AND LATERAL    CLINICAL HISTORY:  Chest Pain;    TECHNIQUE:  Two-view    COMPARISON:  July 16, 2024.    FINDINGS:  Cardiopericardial silhouette enlarged appearance similar.  No dense focal or segmental consolidation, overt congestion, pleural effusion or pneumothorax.                                      Current Medications:     Infusions:       Scheduled:   albuterol-ipratropium  3 mL Nebulization Q4H WAKE    aspirin  81 mg Oral Daily    atorvastatin  80 mg Oral Daily    folic acid  1 mg Oral Daily    furosemide (LASIX) injection  40 mg Intravenous Q12H    metoprolol succinate  12.5 mg Oral Daily    pantoprazole  40 mg Oral BID    rivaroxaban  20 mg Oral Daily    sacubitriL-valsartan  2 tablet Oral BID    spironolactone  50 mg Oral Daily        PRN:    Current Facility-Administered Medications:     acetaminophen, 650 mg, Oral, Q6H PRN    dextrose 10%, 12.5 g, Intravenous, PRN    dextrose 10%, 25 g, Intravenous, PRN    glucagon (human recombinant), 1 mg, Intramuscular, PRN    glucose, 16 g, Oral, PRN    glucose, 24 g, Oral, PRN    melatonin, 6 mg, Oral, Nightly PRN    naloxone, 0.02 mg, Intravenous, PRN     nicotine, 1 patch, Transdermal, Daily PRN    prochlorperazine, 5 mg, Intravenous, Q6H PRN    sodium chloride 0.9%, 10 mL, Intravenous, Q12H PRN    Assessment & Plan:     CHF Exacerbation  - CXR unremarkable  - Previous echo with EF of 50-55%. 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   - Soft BPs overnight. Will decrease Lasix back down to 40mg IV b.i.d.   - Nuclear stress test scheduled for today due to decrease in ejection fraction      COPD on home 2L O2 prn  - Supplemental oxygen prn for goal SpO2 88-92%  - Duonebs q4hr for wheezing     Hypokalemia  - K: 2.7 on admission  - Continue to monitor, will replete as needed.      Transaminitis   - LFTs acutely elevated with no prior hx of hepatic disease  - Pt denies ETOH abuse. ETOH level and UDS negative on admission.   - Lipid panel, Amylase, Lipase unremarkable   - Hep panel nonreactive    Atrial fibrillation, rate controlled   - [id number] score 4   - Continue home Xarelto, ASA 81mg    - Continue home metoprolol succinate 6.25 mg daily   - On telemetry     Hx of cardiac arrest   - Has history of cardiac arrest requiring defibrillation x3 in March 2024. Pt was discharged on home midodrine    - BP stable. Hold home midodrine     HLD  - Continue home atorvastatin 80 mg daily  - Lipid panel: 96 total, HDL 32, LDL 49    GERD  - Continue home Protonix 40mg bid     Tobacco abuse  - 24 pack year smoking history  - Received a prescription for Chantix on the day of admission, but did not start it.   - Patient counseled on the benefits of quitting smoking as well as the risks to his current comorbidities  - Nicotine patches prn on board     Code  Status: Full  Access: PIV  Antibiotics: None  Diet: Low sodium  DVT Prophylaxis: Xarelto  GI Prophylaxis: Protonix  Fluids: None     Disposition: Cardiology following. Plan for nuclear stress test today.       Jennifer Enamorado DO  [facility] HO-2

## 2024-08-16 NOTE — PT/OT/SLP PROGRESS
Physical Therapy Treatment    Patient Name:  Ran Reyes Jr.   MRN:  [id number]    Recommendations     Therapy Intensity Recommendations at Discharge: No Therapy Indicated  Discharge Equipment Recommendations: oxygen, cane, straight   Barriers to discharge: fall risk and decreased endurance    Assessment     Ran Reyes Jr. is a 68 y.o. male admitted with a medical diagnosis of Acute heart failure.  1. Acute heart failure, unspecified heart failure type    2. Chest pain    3. History of atrial fibrillation    4. History of COPD    5. Chest tightness    6. SOB (shortness of breath)    7. NSTEMI (non-ST elevated myocardial infarction)       Patient Active Problem List   Diagnosis    Primary open angle glaucoma (POAG) of right eye, moderate stage    Combined forms of age-related cataract of left eye    Arteriosclerosis of coronary artery    Chronic atrial fibrillation    Congestive heart failure    Dyslipidemia    Hypertension    Tobacco use    PVD (peripheral vascular disease)    VHD (valvular heart disease)    COVID-19    HFrEF (heart failure with reduced ejection fraction)    Nonrheumatic mitral valve regurgitation    Postoperative eye state    Scrotal hematoma    Chronic obstructive pulmonary disease, unspecified    Lesion of external ear    Low back pain    Other thrombophilia    Secondary hyperaldosteronism    Positive colorectal cancer screening using Cologuard test    Acute heart failure    History of COPD      He presents with the following impairments/functional limitations:  impaired endurance, pain, gait instability, impaired cardiopulmonary response to activity, impaired functional mobility.    Rehab Prognosis: Good.    Patient would benefit from continued skilled acute PT services to: address above listed impairments/functional limitations; receive patient/caregiver education; reduce fall risk; and maximize independency/safety with functional mobility.    -continued: up-to-chair, ambulation, with  progression of gait distance/frequency/duration and speed, as tolerated/appropriate, with assistance and supervision    Recent Surgery: * No surgery found *      Plan     During this hospitalization, patient to be seen 5 x/week to address the identified impairments/functional limitations via gait training, therapeutic activities, therapeutic exercises and progress toward the established goals.    Plan of Care Expires:  09/11/24    Subjective     Communicated with patient's nurse Morgan prior to session and Morgan navas'ed therapist ambulating patient to outside [facility].    Patient agreeable to participate in treatment session.    Chief Complaint: hungry  Patient/Family Comments/goals: home  Pain/Comfort:  Pain Rating 1: 6/10  Location - Side 1: Bilateral  Location - Orientation 1: lower  Location 1: leg  Pain Addressed 1: Cessation of Activity, Reposition (nurse Mariam notified (patients nurse not available))  Pain Rating Post-Intervention 1: 3/10    Objective     Patient found sitting in chair with peripheral IV, telemetry  upon PT entry to room.    General Precautions: Standard, fall   Orthopedic Precautions:N/A   Braces: N/A  Respiratory Status: room air  SAT O2 Check: n/a    Functional Mobility:    Bed Mobility:  Seated in chair at start of session and returned to chair at end of session    Transfers:  Sit to Stand: modified independence with no assistive device  with no cues required    Gait:  Patient ambulated 200ft  Pause x1 minute (elevator ride to 1st floor)  Patient ambulated 300ft  Sitting rest x4 minutes (park bench adjacent to [facility])  Patient ambulated 300ft  Pause x1 minute (elevator ride to 4th floor)  Patient ambulated 200ft  with no assistive device and supervision.  Patient demonstrates :       steady gait       no loss of balance       no mis-steps       decreased susie       decreased step length       decreased arm swing    Other Mobility:  not assessed    Balance:  Sit  Patient  demonstrated static balance on level surface with independence with no verbal cues.  Patient demonstrated dynamic balance on level surface with independence with no verbal cues during maximal excursions.  Stand  Patient demonstrated static balance on level surface  using no device with modified independence with no verbal cues.    Patient left sitting in chair with peripheral IV, telemetry with all lines intact, call button in reach, tray table at bedside, and nurse Mariam notified of patients pain and sitting in bedside chair (patients nurse Morgan not available at tx end) .    Education     Patient educated on and assisted with functional mobility as noted above.  Patient educated on PT Plan of Care  Patient was instructed to utilize staff assistance for mobility/transfers.  White board updated regarding patient's safest level of mobility with staff assistance.    Goals     Multidisciplinary Problems       Physical Therapy Goals       Problem: Physical Therapy    Goal Priority Disciplines Outcome Goal Variances Interventions   Physical Therapy Goal     PT, PT/OT Progressing     Description: REVIEWED 08/16/2024  Goals to be met by: DISCHARGE  Patient will increase functional independence with mobility by performing:  -. Supine to sit with [place] - ONGOING  -. Sit to supine with [place] - ONGOING  -. Rolling to Left and Right with [place] - ONGOING  -. Sit to stand transfer with Modified Imperial - PARTIALLY MET (stand to sit) 08/15/2024 - MET 08/16/2024  -. Gait  x 260 feet with Supervision using Single-point Cane - MET 08/15/2024           Time Tracking     PT Received On: 08/16/24  PT Start Time: 0753     PT Stop Time: 0812  PT Total Time (min): 19 min     Billable Minutes: Gait Training 19  Non-Billable Minutes: n/a  08/16/2024

## 2024-08-16 NOTE — NURSING
"1947 Dr. Napoles message via secure message pt requesting medication for itching, awaiting response    2053 Dr. Higginbotham notified pt c/o itching, generalized, requesting medication for itching, states she will put something in.    2117 Dr. Napoles responded to secure message asking where pt is itching, if there was a visible etc. Informed him of what patient stated"I'm itching cause I'm itching I don't know why". No visible rash or cause of itching. States they will up later to see pt. Myesha BRAGG bedside nurse notified of above with verbal understanding.   "

## 2024-08-16 NOTE — NURSING NOTE
Patient came into stress lab and test explained to patient. B/P was taken twice and the first reading was 89/56 and the second reading was 97/61. Lexiscan in its nature causes hypotension. We then explained to patient that he would have to lie on his back for about 20-30 minutes post lexiscan in the nuclear lab department so that imaging could occur. Patient yelled at [employer] and said he will not and cannot lie on his back because he hurts to much and he will not be able to breath. Test aborted at this time for patient safety and non-compliance. Patient brought back to 4th floor in stable condition. Report given to nurse.

## 2024-08-17 LAB
ALBUMIN SERPL-MCNC: 3.3 G/DL (ref 3.4–4.8)
ALBUMIN/GLOB SERPL: 0.8 RATIO (ref 1.1–2)
ALP SERPL-CCNC: 140 UNIT/L (ref 40–150)
ALT SERPL-CCNC: 98 UNIT/L (ref 0–55)
ANION GAP SERPL CALC-SCNC: 12 MEQ/L
AST SERPL-CCNC: 85 UNIT/L (ref 5–34)
BASOPHILS # BLD AUTO: 0.01 X10(3)/MCL
BASOPHILS NFR BLD AUTO: 0.1 %
BILIRUB SERPL-MCNC: 1.2 MG/DL
BUN SERPL-MCNC: 32.1 MG/DL (ref 8.4–25.7)
CALCIUM SERPL-MCNC: 10 MG/DL (ref 8.8–10)
CHLORIDE SERPL-SCNC: 101 MMOL/L (ref 98–107)
CO2 SERPL-SCNC: 23 MMOL/L (ref 23–31)
CREAT SERPL-MCNC: 1.01 MG/DL (ref 0.73–1.18)
CREAT/UREA NIT SERPL: 32
EOSINOPHIL # BLD AUTO: 0 X10(3)/MCL (ref 0–0.9)
EOSINOPHIL NFR BLD AUTO: 0 %
ERYTHROCYTE [DISTWIDTH] IN BLOOD BY AUTOMATED COUNT: 18.6 % (ref 11.5–17)
GFR SERPLBLD CREATININE-BSD FMLA CKD-EPI: >60 ML/MIN/1.73/M2
GLOBULIN SER-MCNC: 4 GM/DL (ref 2.4–3.5)
GLUCOSE SERPL-MCNC: 118 MG/DL (ref 82–115)
HCT VFR BLD AUTO: 36.9 % (ref 42–52)
HGB BLD-MCNC: 11.4 G/DL (ref 14–18)
IMM GRANULOCYTES # BLD AUTO: 0.05 X10(3)/MCL (ref 0–0.04)
IMM GRANULOCYTES NFR BLD AUTO: 0.4 %
LYMPHOCYTES # BLD AUTO: 0.59 X10(3)/MCL (ref 0.6–4.6)
LYMPHOCYTES NFR BLD AUTO: 5 %
MAGNESIUM SERPL-MCNC: 2 MG/DL (ref 1.6–2.6)
MCH RBC QN AUTO: 25.9 PG (ref 27–31)
MCHC RBC AUTO-ENTMCNC: 30.9 G/DL (ref 33–36)
MCV RBC AUTO: 83.9 FL (ref 80–94)
MONOCYTES # BLD AUTO: 0.66 X10(3)/MCL (ref 0.1–1.3)
MONOCYTES NFR BLD AUTO: 5.6 %
NEUTROPHILS # BLD AUTO: 10.44 X10(3)/MCL (ref 2.1–9.2)
NEUTROPHILS NFR BLD AUTO: 88.9 %
NRBC BLD AUTO-RTO: 0 %
PLATELET # BLD AUTO: 266 X10(3)/MCL (ref 130–400)
PMV BLD AUTO: 9.9 FL (ref 7.4–10.4)
POCT GLUCOSE: 119 MG/DL (ref 70–110)
POCT GLUCOSE: 191 MG/DL (ref 70–110)
POCT GLUCOSE: 340 MG/DL (ref 70–110)
POTASSIUM SERPL-SCNC: 4.6 MMOL/L (ref 3.5–5.1)
PROT SERPL-MCNC: 7.3 GM/DL (ref 5.8–7.6)
RBC # BLD AUTO: 4.4 X10(6)/MCL (ref 4.7–6.1)
SODIUM SERPL-SCNC: 136 MMOL/L (ref 136–145)
WBC # BLD AUTO: 11.75 X10(3)/MCL (ref 4.5–11.5)

## 2024-08-17 PROCEDURE — 94640 AIRWAY INHALATION TREATMENT: CPT

## 2024-08-17 PROCEDURE — 63600175 PHARM REV CODE 636 W HCPCS

## 2024-08-17 PROCEDURE — 85025 COMPLETE CBC W/AUTO DIFF WBC: CPT

## 2024-08-17 PROCEDURE — 36415 COLL VENOUS BLD VENIPUNCTURE: CPT

## 2024-08-17 PROCEDURE — 83735 ASSAY OF MAGNESIUM: CPT

## 2024-08-17 PROCEDURE — 80053 COMPREHEN METABOLIC PANEL: CPT

## 2024-08-17 PROCEDURE — 25000242 PHARM REV CODE 250 ALT 637 W/ HCPCS

## 2024-08-17 PROCEDURE — 25000003 PHARM REV CODE 250

## 2024-08-17 PROCEDURE — 99900035 HC TECH TIME PER 15 MIN (STAT)

## 2024-08-17 PROCEDURE — 94761 N-INVAS EAR/PLS OXIMETRY MLT: CPT

## 2024-08-17 PROCEDURE — 21400001 HC TELEMETRY ROOM

## 2024-08-17 RX ORDER — POTASSIUM CHLORIDE 20 MEQ/1
20 TABLET, EXTENDED RELEASE ORAL 2 TIMES DAILY
Qty: 60 TABLET | Refills: 0 | Status: SHIPPED | OUTPATIENT
Start: 2024-08-18

## 2024-08-17 RX ORDER — IBUPROFEN 200 MG
24 TABLET ORAL
Status: DISCONTINUED | OUTPATIENT
Start: 2024-08-17 | End: 2024-08-18 | Stop reason: HOSPADM

## 2024-08-17 RX ORDER — GLUCAGON 1 MG
1 KIT INJECTION
Status: DISCONTINUED | OUTPATIENT
Start: 2024-08-17 | End: 2024-08-18 | Stop reason: HOSPADM

## 2024-08-17 RX ORDER — IPRATROPIUM BROMIDE AND ALBUTEROL SULFATE 2.5; .5 MG/3ML; MG/3ML
3 SOLUTION RESPIRATORY (INHALATION) EVERY 6 HOURS PRN
Qty: 360 ML | Refills: 0 | Status: SHIPPED | OUTPATIENT
Start: 2024-08-17

## 2024-08-17 RX ORDER — IBUPROFEN 200 MG
16 TABLET ORAL
Status: DISCONTINUED | OUTPATIENT
Start: 2024-08-17 | End: 2024-08-18 | Stop reason: HOSPADM

## 2024-08-17 RX ORDER — SPIRONOLACTONE 50 MG/1
50 TABLET, FILM COATED ORAL DAILY
Qty: 90 TABLET | Refills: 3 | Status: SHIPPED | OUTPATIENT
Start: 2024-08-17

## 2024-08-17 RX ORDER — PREDNISONE 20 MG/1
40 TABLET ORAL DAILY
Qty: 8 TABLET | Refills: 0 | Status: SHIPPED | OUTPATIENT
Start: 2024-08-19 | End: 2024-08-23

## 2024-08-17 RX ORDER — PREDNISONE 10 MG/1
50 TABLET ORAL DAILY
Status: DISCONTINUED | OUTPATIENT
Start: 2024-08-18 | End: 2024-08-18 | Stop reason: HOSPADM

## 2024-08-17 RX ORDER — FUROSEMIDE 40 MG/1
40 TABLET ORAL 2 TIMES DAILY
Qty: 60 TABLET | Refills: 0 | Status: SHIPPED | OUTPATIENT
Start: 2024-08-18

## 2024-08-17 RX ADMIN — IPRATROPIUM BROMIDE AND ALBUTEROL SULFATE 3 ML: .5; 3 SOLUTION RESPIRATORY (INHALATION) at 03:08

## 2024-08-17 RX ADMIN — ATORVASTATIN CALCIUM 80 MG: 40 TABLET, FILM COATED ORAL at 08:08

## 2024-08-17 RX ADMIN — ACETAMINOPHEN 650 MG: 325 TABLET, FILM COATED ORAL at 08:08

## 2024-08-17 RX ADMIN — SACUBITRIL AND VALSARTAN 2 TABLET: 24; 26 TABLET, FILM COATED ORAL at 09:08

## 2024-08-17 RX ADMIN — ACETAMINOPHEN 650 MG: 325 TABLET, FILM COATED ORAL at 09:08

## 2024-08-17 RX ADMIN — METOPROLOL SUCCINATE 12.5 MG: 25 TABLET, EXTENDED RELEASE ORAL at 08:08

## 2024-08-17 RX ADMIN — ASPIRIN 81 MG: 81 TABLET, COATED ORAL at 08:08

## 2024-08-17 RX ADMIN — MELATONIN TAB 3 MG 6 MG: 3 TAB at 09:08

## 2024-08-17 RX ADMIN — IPRATROPIUM BROMIDE AND ALBUTEROL SULFATE 3 ML: .5; 3 SOLUTION RESPIRATORY (INHALATION) at 07:08

## 2024-08-17 RX ADMIN — SACUBITRIL AND VALSARTAN 2 TABLET: 24; 26 TABLET, FILM COATED ORAL at 08:08

## 2024-08-17 RX ADMIN — METHYLPREDNISOLONE SODIUM SUCCINATE 80 MG: 40 INJECTION, POWDER, FOR SOLUTION INTRAMUSCULAR; INTRAVENOUS at 01:08

## 2024-08-17 RX ADMIN — PANTOPRAZOLE SODIUM 40 MG: 40 TABLET, DELAYED RELEASE ORAL at 08:08

## 2024-08-17 RX ADMIN — SPIRONOLACTONE 50 MG: 25 TABLET ORAL at 08:08

## 2024-08-17 RX ADMIN — FUROSEMIDE 40 MG: 10 INJECTION, SOLUTION INTRAMUSCULAR; INTRAVENOUS at 08:08

## 2024-08-17 RX ADMIN — FUROSEMIDE 40 MG: 10 INJECTION, SOLUTION INTRAMUSCULAR; INTRAVENOUS at 09:08

## 2024-08-17 RX ADMIN — FOLIC ACID 1 MG: 1 TABLET ORAL at 08:08

## 2024-08-17 RX ADMIN — PANTOPRAZOLE SODIUM 40 MG: 40 TABLET, DELAYED RELEASE ORAL at 09:08

## 2024-08-17 RX ADMIN — RIVAROXABAN 20 MG: 10 TABLET, FILM COATED ORAL at 05:08

## 2024-08-17 NOTE — PLAN OF CARE
Problem: Pain Acute  Goal: Optimal Pain Control and Function  8/17/2024 0532 by Rhina Recinos LPN  Outcome: Progressing  8/17/2024 0532 by Rhina Recinos LPN  Outcome: Progressing     Problem: Fall Injury Risk  Goal: Absence of Fall and Fall-Related Injury  Outcome: Progressing

## 2024-08-17 NOTE — MEDICAL/APP STUDENT
[facility] - Inpatient Hospital Medicine  Progress Note    Admission Date: 8/13/2024  Code Status: Full Code   Attending Physician: May Angulo MD   Expected Discharge Date: 8/20/2024  Principal Problem:Acute heart failure    Subjective:   aRn Reyes Jr. is a 68 y.o. male with PMH significant for atrial fibrillation on Xarelto, CHF, COPD on 2L home oxygen, CAD, HTN, mitral regurgitation, PAD who presented to [facility] ED on 8/13/2024 with primary complaints of cough, shortness of breath, and abdominal swelling x 2 days. States that he has been unable to lie flat due to increased shortness of breath and difficulty breathing. He has needed to use his home oxygen more frequently the past two days. He feels like his abdomen has been more swollen which has caused some discomfort and nausea. Reports one episode of vomiting this morning. Also having epigastric burning with deep inspiration. Has also had increased lower extremity swelling. States that he has been adherent to his daily medications.     In the ED, vitals upon arrival were T 98, GIOVANNI 133/86, , RR 18, SpO2 99% on RA. Labs significant for K 2.7, creatinine 1.23, , AST 81, BNP 2531.8. Troponin negative. CXR performed and showed no acute cardiopulmonary process. EKG showed atrial fibrillation with PVCs, rate 86 bpm. ST and T wave abnormality, unchanged from previous. He was given duoneb x1 in the ED with subjective improvement in shortness of breath. He was also given Lasix 40 mg IV and Kcl 40 mEq. Medicine was consulted for further management.    Interval History:   Patient was seen and examined while sitting up in the bedside chair. He denies CP, SOB, abdominal pains. Conversation was had about the importance of the medication compliance and patient's barriers to the compliance. We are going to work on getting him meds to beds tomorrow and then outpatient delivery of medications due to transportation issue.    Objective:     Vital Signs  (Most Recent):  Temp: 97.4 °F (36.3 °C) (08/17/24 0750)  Pulse: 69 (08/17/24 0750)  Resp: 18 (08/17/24 0714)  BP: 107/63 (08/17/24 0750)  SpO2: 99 % (08/17/24 0750) Vital Signs (24h Range):  Temp:  [97.4 °F (36.3 °C)-97.8 °F (36.6 °C)] 97.4 °F (36.3 °C)  Pulse:  [48-90] 69  Resp:  [16-20] 18  SpO2:  [91 %-99 %] 99 %  BP: ()/(53-70) 107/63     Weight: 94.9 kg (209 lb 3.5 oz)  Body mass index is 28.37 kg/m².    SpO2: 99 %       Physical Exam  Vitals reviewed.   General: Normal appearing  HEENT: NC/AT. MMM. EOMI. Scleral icterus present.   Cardio: Irregular rhythm, normal rate. No M,R,G.   Pulm: No rhonchi, wheezing, or rales present. Breath sounds in all lung fields.   Abdominal: Soft, nontender abdomen. No epigastric pain to palpation.   MSK: Normal ROM. No swelling noted in lower extremities.   Neuro: A&Ox3. No focal deficit present.  Psych: Thought content, mood, and judgment are normal.    Assessment and Plan:     CHF Exacerbation  - ChestXR reports no acute cardiopulmonary process.   - Echo (8/14/24) shows EF reduced to 22% from previous echo EF of 50-55%.   - Strict I&Os and Daily weights  - Fluid restriction, low sodium diet  - Ethanol and UDS negative  - Continue Lasix 40mg IV b.i.d.   - Continue home spironolactone 50 mg and metoprolol succinate 12.5 mg daily  - Continue Entresto 24mg/26mg twice daily   - Nuclear Stress test unable to be performed yesterday. Will do outpatient      COPD  On Home 2L O2, prn   - Duonebs scheduled q4hr while patient is awake to help with wheezing    - Continue to monitor     Hypokalemia  - K: 2.7 on admission  - Continue to monitor, will replete as needed.      Transaminitis   - LFTs acutely elevated with no prior hx of hepatic disease  - Pt denies ETOH abuse. ETOH level and UDS negative on admission.   - Lipid panel shows low HDL and LDL  - Amylase, Lipase are normal  - Hepatitis panel was nonreactive     Atrial fibrillation, rate controlled   - [id number] score 4   -  Continue home Xarelto, ASA 81mg    - Continue home metoprolol succinate 12.5mg daily   - On telemetry     Hx of cardiac arrest   - Has history of cardiac arrest requiring defibrillation x3 in March 2024. He was discharged on midodrine.   - BP stable; Hold home midodrine     HLD  - Continue home atorvastatin 80 mg daily  - Lipid panel: 96 total, HDL 32, LDL 49     GERD  - Continue home Protonix     Tobacco abuse  - 24 pack year smoking history  - Received a prescription for Chantix on the day of admission, but did not start it.   - Patient counseled on the benefits of quitting smoking as well as the risks to his current comorbidities  - Nicotine patches prn on board     Code Status: Full  Access: PIV  Antibiotics: None  Diet: Low sodium  DVT Prophylaxis: Xarelto  GI Prophylaxis: Protonix  Fluids: None     Disposition: Patient stable today, continue care. Meds to beds tomorrow and plan to discharge.      Joe Ackerman, [facility] [place] [facility]  Inpatient Hospital Medicine  [facility]

## 2024-08-17 NOTE — DISCHARGE SUMMARY
[facility] Discharge Summary    Admitting Physician: May Angulo MD  Attending Physician: May Angulo MD  Date of Admit: 8/13/2024  Date of Discharge: 8/18/2024    Condition: Stable  Outcome: Condition has improved and patient is now ready for discharge.  DISPOSITION: Home or Self Care      Discharge Diagnoses     Patient Active Problem List   Diagnosis    Primary open angle glaucoma (POAG) of right eye, moderate stage    Combined forms of age-related cataract of left eye    Arteriosclerosis of coronary artery    Chronic atrial fibrillation    Congestive heart failure    Dyslipidemia    Hypertension    Tobacco use    PVD (peripheral vascular disease)    VHD (valvular heart disease)    COVID-19    HFrEF (heart failure with reduced ejection fraction)    Nonrheumatic mitral valve regurgitation    Postoperative eye state    Scrotal hematoma    Chronic obstructive pulmonary disease, unspecified    Lesion of external ear    Low back pain    Other thrombophilia    Secondary hyperaldosteronism    Positive colorectal cancer screening using Cologuard test    Acute heart failure    History of COPD       Principal Problem:  Acute heart failure    Consultants and Procedures     Consultants:  IP CONSULT TO INTERNAL MEDICINE  IP CONSULT TO CARDIOLOGY    Procedures:   * No surgery found *     Brief Admission History      Ran Reyes Jr. is a 68 y.o. male with PMH of Afib on Xarelto, CHF, COPD on 2L home oxygen prn, CAD, HTN, mitral regurgitation, PAD who presented to [facility] ED on 8/13/2024 with primary complaints of cough, shortness of breath, and abdominal swelling x 2 days. Patient reports orthopnea and dyspnea worsening on exertion. He has needed to use his home oxygen more frequently the past two days. Patient also feels his abdomen is more distended and c/o epigastric burning pain, nausea and 1 episode of vomiting prior to ED arrival. Patient follows with [facility] cardiology. He reports a previous discussion of AICD  placement, but this was postponed when he developed a scrotal abscess requiring hospitalization. Pt reports compliance with home Lasix 40mg BID, spironolactone 50mg qd, Toprol 6.25 qd, and Xarelto 20mg. Per chart review, patient is also prescribed Entresto, however he states he is unaware of this medication.      In the ED, vitals were stable, pt sating 99% on RA. Labs significant for hypokalemia (2.7); pt noncompliant with prescribed KCL supplement. BNP elevated at 2500. Troponin negative. CXR unremarkable. EKG showed afib with PVCs, rate 86 bpm. ST and T wave abnormality, unchanged from previous. He was given duoneb x1 in the ED with subjective improvement in shortness of breath. Medicine was consulted for CHF exacerbation and hypokalemia.     Hospital Course with Pertinent Findings     Patient was admitted for CHF exacerbation. He was started on IV Lasix for diuresis. Repeat echo (8/14) showed EF of 22% from 50-55% previously. He was continued on his home spironolactone and metoprolol succinate, and his Entresto which he had not been taking was restarted. Cardiology was consulted for worsening EF, and nuclear stress test was scheduled for 8/16; however, it was canceled due to patient's reported intolerance to lying flat in bed. He was also started during his inpatient stay on IV steroids for dyspnea in setting of COPD. By 8/17, patient was quite anxious to go home and opting to go home and get stress test done as outpatient. By day of discharge, patient was stable, dyspnea improved, had worked well with PT. Vitals stable. He is stable to be discharged home on rest of his Prednisone course. Now on full GDMT.     Discharge physical exam:  Vitals  BP: 112/67  Temp: 97.7 °F (36.5 °C)  Temp Source: Oral  Pulse: 68  Resp: 18  SpO2: 100 %  Height: 6' (182.9 cm)  Weight: 94.9 kg (209 lb 3.5 oz)  Physical Exam  Constitutional:       General: He is not in acute distress.  Eyes:      Extraocular Movements: Extraocular  movements intact.      Conjunctiva/sclera: Conjunctivae normal.   Cardiovascular:      Rate and Rhythm: Normal rate and regular rhythm.      Pulses: Normal pulses.   Pulmonary:      Effort: Pulmonary effort is normal.      Comments: Improvement of breath sounds from admission. Normal WOB. Mild wheezes bilaterally, minimal crackles.   Abdominal:      General: There is no distension.      Palpations: Abdomen is soft.      Tenderness: There is no abdominal tenderness. There is no guarding.   Musculoskeletal:      Cervical back: Normal range of motion.      Right lower leg: No edema.      Left lower leg: No edema.   Skin:     General: Skin is warm and dry.   Neurological:      General: No focal deficit present.      Mental Status: He is alert.   Psychiatric:         Thought Content: Thought content normal.     TIME SPENT ON DISCHARGE: 60 minutes    Discharge Medications        Medication List        START taking these medications      albuterol-ipratropium 2.5 mg-0.5 mg/3 mL nebulizer solution  Commonly known as: DUO-NEB  Take 3 mLs by nebulization every 6 (six) hours as needed for Wheezing. Rescue     predniSONE 20 MG tablet  Commonly known as: DELTASONE  Take 2 tablets (40 mg total) by mouth once daily. for 4 days  Start taking on: August 19, 2024     sacubitriL-valsartan 24-26 mg per tablet  Commonly known as: ENTRESTO  Take 2 tablets by mouth 2 (two) times daily.  Start taking on: August 19, 2024            CHANGE how you take these medications      spironolactone 50 MG tablet  Commonly known as: ALDACTONE  Take 1 tablet (50 mg total) by mouth once daily.  What changed: how much to take            CONTINUE taking these medications      aspirin 81 MG EC tablet  Commonly known as: ECOTRIN  Take 1 tablet (81 mg total) by mouth once daily.     atorvastatin 80 MG tablet  Commonly known as: LIPITOR  Take 1 tablet (80 mg total) by mouth once daily.     cetirizine 10 MG tablet  Commonly known as: ZYRTEC  Take 1 tablet (10  mg total) by mouth once daily.     folic acid 1 MG tablet  Commonly known as: FOLVITE  Take 1 tablet (1 mg total) by mouth once daily.     furosemide 40 MG tablet  Commonly known as: LASIX  Take 1 tablet (40 mg total) by mouth 2 (two) times a day.     hydrOXYzine HCL 10 MG Tab  Commonly known as: ATARAX     metoprolol succinate 25 MG 24 hr tablet  Commonly known as: TOPROL-XL  Take 0.5 tablets (12.5 mg total) by mouth once daily.     midodrine 10 MG tablet  Commonly known as: PROAMATINE  Take 1 tablet (10 mg total) by mouth 3 (three) times daily with meals.     pantoprazole 40 MG tablet  Commonly known as: PROTONIX  Take 1 tablet (40 mg total) by mouth 2 (two) times a day.     potassium chloride SA 20 MEQ tablet  Commonly known as: K-DUR,KLOR-CON  Take 1 tablet (20 mEq total) by mouth 2 (two) times daily.     rivaroxaban 20 mg Tab  Commonly known as: XARELTO  Take 1 tablet (20 mg total) by mouth Daily.     varenicline 0.5 mg (11)- 1 mg (42) tablet  Commonly known as: CHANTIX STARTING MONTH BOX  Take one 0.5mg tab by mouth once daily X3 days,then increase to one 0.5mg tab twice daily X4 days,then increase to one 1mg tab twice daily            ASK your doctor about these medications      triamcinolone acetonide 0.1% 0.1 % cream  Commonly known as: KENALOG               Where to Get Your Medications        These medications were sent to [facility] - [address] [address]      Phone: [phone]   albuterol-ipratropium 2.5 mg-0.5 mg/3 mL nebulizer solution  furosemide 40 MG tablet  potassium chloride SA 20 MEQ tablet  predniSONE 20 MG tablet  sacubitriL-valsartan 24-26 mg per tablet  spironolactone 50 MG tablet         Discharge Information:     - now on full GDMT  - has issues with transportation and picking up meds from pharmacy. Patient informed about [facility] pharmacy home delivery. He will call on Monday to see about delivery.   - needs  stress test as outpatient. Already follows with Dr. Hilton. Will follow up with Cardiology so he gets scheduled.   - follow up: IM [facility] in 1 week

## 2024-08-17 NOTE — DISCHARGE SUMMARY
[facility] - Hospital Medicine  Progress Note      Resident Team: [facility] Medicine List 2  Attending Physician: May Angulo MD        Subjective:      Brief HPI:  aRn Reyes Jr. is a 68 y.o. male with PMH of Afib on Xarelto, CHF, COPD on 2L home oxygen prn, CAD, HTN, mitral regurgitation, PAD who presented to [facility] ED on 8/13/2024 with primary complaints of cough, shortness of breath, and abdominal swelling x 2 days. Patient reports orthopnea and dyspnea worsening on exertion. He has needed to use his home oxygen more frequently the past two days. Patient also feels his abdomen is more distended and c/o epigastric burning pain, nausea and 1 episode of vomiting prior to ED arrival. Patient follows with [facility] cardiology. He reports a previous discussion of AICD placement, but this was postponed when he developed a scrotal abscess requiring hospitalization. Pt reports compliance with home Lasix 40mg BID, spironolactone 50mg qd, Toprol 6.25 qd, and Xarelto 20mg. Per chart review, patient is also prescribed Entresto, however he states he is unaware of this medication.      In the ED, vitals were stable, pt sating 99% on RA. Labs significant for hypokalemia (2.7); pt noncompliant with prescribed KCL supplement. BNP elevated at 2500. Troponin negative. CXR unremarkable. EKG showed afib with PVCs, rate 86 bpm. ST and T wave abnormality, unchanged from previous. He was given duoneb x1 in the ED with subjective improvement in shortness of breath. Medicine was consulted for CHF exacerbation and hypokalemia.     Interval History:   NAEON. VSS. Unable to obtain stress test yesterday due to reported intolerance of lying flat. Will obtain outpatient. Patient otherwise continuing to improve. Unfortunately, patient does not have transportation to the pharmacy to obtain his medications. Will continue on inpatient regimen with plans for meds to bed on discharge. Pt will also need to be set up for    Review of Systems:  TAMIKO  completed and negative except as indicated above.     Objective:     Last 24 Hour Vital Signs:  BP  Min: 94/62  Max: 107/63  Temp  Av.3 °F (36.3 °C)  Min: 96.4 °F (35.8 °C)  Max: 97.8 °F (36.6 °C)  Pulse  Av.3  Min: 48  Max: 90  Resp  Av.8  Min: 16  Max: 20  SpO2  Av.9 %  Min: 91 %  Max: 100 %  Weight  Av.9 kg (209 lb 3.5 oz)  Min: 94.9 kg (209 lb 3.5 oz)  Max: 94.9 kg (209 lb 3.5 oz)  I/O last 3 completed shifts:  In: 480 [P.O.:480]  Out: 4450 [Urine:4450]    Physical Examination:  Physical Exam  Constitutional:       General: He is not in acute distress.  Eyes:      Extraocular Movements: Extraocular movements intact.      Conjunctiva/sclera: Conjunctivae normal.   Cardiovascular:      Rate and Rhythm: Normal rate and regular rhythm.      Pulses: Normal pulses.   Pulmonary:      Effort: Pulmonary effort is normal.      Comments: Improvement of breath sounds from admission. Normal WOB. Mild wheezes bilaterally, minimal crackles.   Abdominal:      General: There is no distension.      Palpations: Abdomen is soft.      Tenderness: There is no abdominal tenderness. There is no guarding.   Musculoskeletal:      Cervical back: Normal range of motion.      Right lower leg: No edema.      Left lower leg: No edema.   Skin:     General: Skin is warm and dry.   Neurological:      General: No focal deficit present.      Mental Status: He is alert.   Psychiatric:         Thought Content: Thought content normal.           Laboratory:  Most Recent Data:  CBC:   Lab Results   Component Value Date    WBC 11.75 (H) 2024    HGB 11.4 (L) 2024    HCT 36.9 (L) 2024     2024    MCV 83.9 2024    RDW 18.6 (H) 2024     WBC Differential:   Recent Labs   Lab 24  1133 24  0539 08/15/24  0419 24  0346 24  0423   WBC 6.24 6.77 7.13 6.77 11.75*   HGB 10.5* 10.5* 11.0* 11.3* 11.4*   HCT 32.6* 33.0* 35.3* 36.0* 36.9*    215 209 234 266   MCV 81.3  82.3 82.5 83.1 83.9     BMP:   Lab Results   Component Value Date     08/17/2024    K 4.6 08/17/2024     08/17/2024    CO2 23 08/17/2024    BUN 32.1 (H) 08/17/2024    CREATININE 1.01 08/17/2024    CALCIUM 10.0 08/17/2024    MG 2.00 08/17/2024    PHOS 2.7 04/28/2024     LFTs:   Lab Results   Component Value Date    ALBUMIN 3.3 (L) 08/17/2024    BILITOT 1.2 08/17/2024    AST 85 (H) 08/17/2024    ALKPHOS 140 08/17/2024    ALT 98 (H) 08/17/2024     DM:   Lab Results   Component Value Date    HGBA1C 5.0 03/18/2024    HGBA1C 4.7 11/15/2021    HGBA1C 5.7 10/07/2020    CREATININE 1.01 08/17/2024     Cardiac:   Lab Results   Component Value Date    TROPONINI 0.030 08/13/2024    BNP 2,531.8 (H) 08/13/2024       Radiology:  Imaging Results              X-Ray Chest PA And Lateral (Final result)  Result time 08/13/24 12:55:38      Final result by Mickey West MD (08/13/24 12:55:38)                   Impression:      No acute cardiopulmonary process identified.      Electronically signed by: Mickey West  Date:    08/13/2024  Time:    12:55               Narrative:    EXAMINATION:  XR CHEST PA AND LATERAL    CLINICAL HISTORY:  Chest Pain;    TECHNIQUE:  Two-view    COMPARISON:  July 16, 2024.    FINDINGS:  Cardiopericardial silhouette enlarged appearance similar.  No dense focal or segmental consolidation, overt congestion, pleural effusion or pneumothorax.                                      Current Medications:     Infusions:       Scheduled:   albuterol-ipratropium  3 mL Nebulization Q4H WAKE    aspirin  81 mg Oral Daily    atorvastatin  80 mg Oral Daily    folic acid  1 mg Oral Daily    furosemide (LASIX) injection  40 mg Intravenous Q12H    methylPREDNISolone injection (PEDS and ADULTS)  80 mg Intravenous Daily    metoprolol succinate  12.5 mg Oral Daily    pantoprazole  40 mg Oral BID    rivaroxaban  20 mg Oral Daily    sacubitriL-valsartan  2 tablet Oral BID    spironolactone  50 mg Oral Daily         PRN:    Current Facility-Administered Medications:     acetaminophen, 650 mg, Oral, Q6H PRN    dextrose 10%, 12.5 g, Intravenous, PRN    dextrose 10%, 25 g, Intravenous, PRN    glucagon (human recombinant), 1 mg, Intramuscular, PRN    glucose, 16 g, Oral, PRN    glucose, 24 g, Oral, PRN    melatonin, 6 mg, Oral, Nightly PRN    naloxone, 0.02 mg, Intravenous, PRN    nicotine, 1 patch, Transdermal, Daily PRN    prochlorperazine, 5 mg, Intravenous, Q6H PRN    sodium chloride 0.9%, 10 mL, Intravenous, Q12H PRN    Assessment & Plan:     CHF Exacerbation  - CXR unremarkable  - Previous echo with EF of 50-55%. 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   - Lasix 40mg IV b.i.d.   - Nuclear stress test attempted but unable to complete inpatient. Will need outpatient stress test.       COPD on home 2L O2 prn  - Supplemental oxygen prn for goal SpO2 88-92%  - Duonebs q4hr for wheezing   - Continue Solumedrol 80mg daily. Will prescribe Prednisone 40mg x 5 days on discharge.    Hypokalemia  - K: 2.7 on admission  - Continue to monitor, will replete as needed.      Transaminitis   - LFTs acutely elevated with no prior hx of hepatic disease  - Pt denies ETOH abuse. ETOH level and UDS negative on admission.   - Lipid panel, Amylase, Lipase unremarkable   - Hep panel nonreactive    Atrial fibrillation, rate controlled   - [id number] score 4   - Continue home Xarelto, ASA 81mg    - Continue home metoprolol succinate 6.25 mg daily     Hx of cardiac arrest   - Has history of cardiac arrest requiring defibrillation x3 in March 2024. Pt was discharged on home midodrine    - BP stable. Hold home midodrine     HLD  - Continue home  atorvastatin 80 mg daily  - Lipid panel: 96 total, HDL 32, LDL 49    GERD  - Continue home Protonix 40mg bid     Tobacco abuse  - 24 pack year smoking history  - Received a prescription for Chantix on the day of admission, but did not start it.   - Patient counseled on the benefits of quitting smoking as well as the risks to his current comorbidities  - Nicotine patches prn on board     Code Status: Full  Access: PIV  Antibiotics: None  Diet: Low sodium  DVT Prophylaxis: Xarelto  GI Prophylaxis: Protonix  Fluids: None     Disposition: Cardiology following. Plan for discharge tomorrow or Monday.       Jennifer Enamorado DO  [facility] HO-2

## 2024-08-17 NOTE — NURSING
21 beat run of Vtach noted, on call team notified. Patient asleep at the time, awaken easily, without complaints

## 2024-08-18 VITALS
SYSTOLIC BLOOD PRESSURE: 112 MMHG | DIASTOLIC BLOOD PRESSURE: 67 MMHG | OXYGEN SATURATION: 100 % | WEIGHT: 209.19 LBS | BODY MASS INDEX: 28.33 KG/M2 | HEIGHT: 72 IN | TEMPERATURE: 98 F | RESPIRATION RATE: 20 BRPM | HEART RATE: 68 BPM

## 2024-08-18 LAB
ALBUMIN SERPL-MCNC: 3.2 G/DL (ref 3.4–4.8)
ALBUMIN/GLOB SERPL: 0.8 RATIO (ref 1.1–2)
ALP SERPL-CCNC: 120 UNIT/L (ref 40–150)
ALT SERPL-CCNC: 77 UNIT/L (ref 0–55)
ANION GAP SERPL CALC-SCNC: 9 MEQ/L
AST SERPL-CCNC: 51 UNIT/L (ref 5–34)
BASOPHILS # BLD AUTO: 0.01 X10(3)/MCL
BASOPHILS NFR BLD AUTO: 0.1 %
BILIRUB SERPL-MCNC: 0.9 MG/DL
BUN SERPL-MCNC: 31.3 MG/DL (ref 8.4–25.7)
CALCIUM SERPL-MCNC: 9.9 MG/DL (ref 8.8–10)
CHLORIDE SERPL-SCNC: 102 MMOL/L (ref 98–107)
CO2 SERPL-SCNC: 25 MMOL/L (ref 23–31)
CREAT SERPL-MCNC: 0.94 MG/DL (ref 0.73–1.18)
CREAT/UREA NIT SERPL: 33
EOSINOPHIL # BLD AUTO: 0 X10(3)/MCL (ref 0–0.9)
EOSINOPHIL NFR BLD AUTO: 0 %
ERYTHROCYTE [DISTWIDTH] IN BLOOD BY AUTOMATED COUNT: 19.3 % (ref 11.5–17)
GFR SERPLBLD CREATININE-BSD FMLA CKD-EPI: >60 ML/MIN/1.73/M2
GLOBULIN SER-MCNC: 3.9 GM/DL (ref 2.4–3.5)
GLUCOSE SERPL-MCNC: 118 MG/DL (ref 82–115)
HCT VFR BLD AUTO: 39.4 % (ref 42–52)
HGB BLD-MCNC: 12.3 G/DL (ref 14–18)
IMM GRANULOCYTES # BLD AUTO: 0.05 X10(3)/MCL (ref 0–0.04)
IMM GRANULOCYTES NFR BLD AUTO: 0.4 %
LYMPHOCYTES # BLD AUTO: 0.62 X10(3)/MCL (ref 0.6–4.6)
LYMPHOCYTES NFR BLD AUTO: 5.2 %
MAGNESIUM SERPL-MCNC: 2.1 MG/DL (ref 1.6–2.6)
MCH RBC QN AUTO: 26.3 PG (ref 27–31)
MCHC RBC AUTO-ENTMCNC: 31.2 G/DL (ref 33–36)
MCV RBC AUTO: 84.4 FL (ref 80–94)
MONOCYTES # BLD AUTO: 0.64 X10(3)/MCL (ref 0.1–1.3)
MONOCYTES NFR BLD AUTO: 5.4 %
NEUTROPHILS # BLD AUTO: 10.56 X10(3)/MCL (ref 2.1–9.2)
NEUTROPHILS NFR BLD AUTO: 88.9 %
NRBC BLD AUTO-RTO: 0 %
PLATELET # BLD AUTO: 253 X10(3)/MCL (ref 130–400)
PLATELETS.RETICULATED NFR BLD AUTO: 2.8 % (ref 0.9–11.2)
PMV BLD AUTO: 10.6 FL (ref 7.4–10.4)
POCT GLUCOSE: 153 MG/DL (ref 70–110)
POTASSIUM SERPL-SCNC: 4.9 MMOL/L (ref 3.5–5.1)
PROT SERPL-MCNC: 7.1 GM/DL (ref 5.8–7.6)
RBC # BLD AUTO: 4.67 X10(6)/MCL (ref 4.7–6.1)
SODIUM SERPL-SCNC: 136 MMOL/L (ref 136–145)
WBC # BLD AUTO: 11.88 X10(3)/MCL (ref 4.5–11.5)

## 2024-08-18 PROCEDURE — 63600175 PHARM REV CODE 636 W HCPCS

## 2024-08-18 PROCEDURE — 85025 COMPLETE CBC W/AUTO DIFF WBC: CPT

## 2024-08-18 PROCEDURE — 80053 COMPREHEN METABOLIC PANEL: CPT

## 2024-08-18 PROCEDURE — 36415 COLL VENOUS BLD VENIPUNCTURE: CPT

## 2024-08-18 PROCEDURE — 25000003 PHARM REV CODE 250

## 2024-08-18 PROCEDURE — 94640 AIRWAY INHALATION TREATMENT: CPT

## 2024-08-18 PROCEDURE — 83735 ASSAY OF MAGNESIUM: CPT

## 2024-08-18 PROCEDURE — 25000242 PHARM REV CODE 250 ALT 637 W/ HCPCS

## 2024-08-18 RX ADMIN — IPRATROPIUM BROMIDE AND ALBUTEROL SULFATE 3 ML: .5; 3 SOLUTION RESPIRATORY (INHALATION) at 07:08

## 2024-08-18 RX ADMIN — SACUBITRIL AND VALSARTAN 2 TABLET: 24; 26 TABLET, FILM COATED ORAL at 08:08

## 2024-08-18 RX ADMIN — PANTOPRAZOLE SODIUM 40 MG: 40 TABLET, DELAYED RELEASE ORAL at 08:08

## 2024-08-18 RX ADMIN — SPIRONOLACTONE 50 MG: 25 TABLET ORAL at 08:08

## 2024-08-18 RX ADMIN — FUROSEMIDE 40 MG: 10 INJECTION, SOLUTION INTRAMUSCULAR; INTRAVENOUS at 08:08

## 2024-08-18 RX ADMIN — METOPROLOL SUCCINATE 12.5 MG: 25 TABLET, EXTENDED RELEASE ORAL at 08:08

## 2024-08-18 RX ADMIN — ATORVASTATIN CALCIUM 80 MG: 40 TABLET, FILM COATED ORAL at 08:08

## 2024-08-18 RX ADMIN — FOLIC ACID 1 MG: 1 TABLET ORAL at 08:08

## 2024-08-18 RX ADMIN — ASPIRIN 81 MG: 81 TABLET, COATED ORAL at 08:08

## 2024-08-18 RX ADMIN — PREDNISONE 50 MG: 10 TABLET ORAL at 08:08

## 2024-08-18 NOTE — PT/OT/SLP DISCHARGE
POST DISCHARGE DOCUMENTATION - 08/18/2024 2:08 PM    Physical Therapy Discharge Note    Documenting Physical Therapist did not evaluate OR treat the patient.    Evaluating/treating Physical Therapist is not available to complete discharge summary.    Refer to prior Physical Therapy note dated 8/16/24 for last known functional status of patient.      Name: Ran Reyes Jr.  MRN: [id number]   Principal Problem: Acute heart failure     Recommendations: per last treatment session     Therapy Intensity Recommendations at Discharge: No Therapy Indicated  Discharge Equipment Recommendations: oxygen, cane, straight     Assessment:     Patient was unexpectedly discharged from hospital.  Refer to therapy's initial evaluation or last treatment note for patient's most recent functional status and goal achievement and therapists' recommendations.    Objective:     GOALS:2/5  goals met  Multidisciplinary Problems       Physical Therapy Goals          Problem: Physical Therapy    Goal Priority Disciplines Outcome Goal Variances Interventions   Physical Therapy Goal     PT, PT/OT Progressing     Description: REVIEWED 08/16/2024  Goals to be met by: DISCHARGE  Patient will increase functional independence with mobility by performing:  -. Supine to sit with Nance - ONGOING  -. Sit to supine with Nance - ONGOING  -. Rolling to Left and Right with Nance - ONGOING  -. Sit to stand transfer with Modified Nance - PARTIALLY MET (stand to sit) 08/15/2024 - MET 08/16/2024  -. Gait  x 260 feet with Supervision using Single-point Cane - MET 08/15/2024                     Plan:     Patient Discharged to: home or self care per chart.    8/18/2024

## 2024-08-18 NOTE — NURSING
Confirmed with Dr. Erika navas to discharge patient now without meds-to-bed. Patient will  meds from pharmacy tomorrow.

## 2024-08-20 ENCOUNTER — PATIENT OUTREACH (OUTPATIENT)
Dept: ADMINISTRATIVE | Facility: CLINIC | Age: 69
End: 2024-08-20
Payer: MEDICARE

## 2024-08-20 NOTE — PROGRESS NOTES
C3 nurse attempted to contact Ran Reyes Jr.  for a TCC post hospital discharge follow up call. No answer. Left voicemail with callback information. The patient has a scheduled HOSFU appointment with Sagar Napoles MD (Internal Medicine) on 8/26/24 @ 12:30 pm

## 2024-08-21 ENCOUNTER — TELEPHONE (OUTPATIENT)
Dept: HEPATOLOGY | Facility: HOSPITAL | Age: 69
End: 2024-08-21
Payer: MEDICARE

## 2024-08-21 RX ORDER — SACUBITRIL AND VALSARTAN 49; 51 MG/1; MG/1
1 TABLET, FILM COATED ORAL 2 TIMES DAILY
Qty: 60 TABLET | Refills: 0 | Status: SHIPPED | OUTPATIENT
Start: 2024-08-21

## 2024-08-21 NOTE — PROGRESS NOTES
C3 nurse made second attempt to contact Ran Reyes Jr. for a TCC post hospital discharge follow up call.

## 2024-08-21 NOTE — PROGRESS NOTES
C3 nurse made third attempt to contact Ran Reyes Jr. for a TCC post hospital discharge follow up call.

## 2024-08-26 ENCOUNTER — OFFICE VISIT (OUTPATIENT)
Dept: INTERNAL MEDICINE | Facility: CLINIC | Age: 69
End: 2024-08-26
Payer: MEDICARE

## 2024-08-26 ENCOUNTER — HOSPITAL ENCOUNTER (EMERGENCY)
Facility: HOSPITAL | Age: 69
Discharge: HOME | End: 2024-08-26
Attending: INTERNAL MEDICINE
Payer: MEDICARE

## 2024-08-26 VITALS
BODY MASS INDEX: 27.9 KG/M2 | DIASTOLIC BLOOD PRESSURE: 50 MMHG | HEIGHT: 72 IN | TEMPERATURE: 98 F | SYSTOLIC BLOOD PRESSURE: 78 MMHG | HEART RATE: 59 BPM | RESPIRATION RATE: 18 BRPM | WEIGHT: 206 LBS | OXYGEN SATURATION: 100 %

## 2024-08-26 VITALS
RESPIRATION RATE: 20 BRPM | HEIGHT: 72 IN | SYSTOLIC BLOOD PRESSURE: 101 MMHG | WEIGHT: 205 LBS | OXYGEN SATURATION: 100 % | DIASTOLIC BLOOD PRESSURE: 77 MMHG | BODY MASS INDEX: 27.77 KG/M2 | TEMPERATURE: 98 F | HEART RATE: 70 BPM

## 2024-08-26 DIAGNOSIS — N17.9 AKI (ACUTE KIDNEY INJURY): Primary | ICD-10-CM

## 2024-08-26 DIAGNOSIS — N30.00 ACUTE CYSTITIS WITHOUT HEMATURIA: ICD-10-CM

## 2024-08-26 DIAGNOSIS — I50.9 ACUTE HEART FAILURE, UNSPECIFIED HEART FAILURE TYPE: ICD-10-CM

## 2024-08-26 DIAGNOSIS — R55 SYNCOPE: ICD-10-CM

## 2024-08-26 DIAGNOSIS — R53.1 WEAKNESS: ICD-10-CM

## 2024-08-26 LAB
ALBUMIN SERPL-MCNC: 3.7 G/DL (ref 3.4–4.8)
ALBUMIN/GLOB SERPL: 0.9 RATIO (ref 1.1–2)
ALP SERPL-CCNC: 113 UNIT/L (ref 40–150)
ALT SERPL-CCNC: 23 UNIT/L (ref 0–55)
ANION GAP SERPL CALC-SCNC: 12 MEQ/L
AST SERPL-CCNC: 20 UNIT/L (ref 5–34)
BACTERIA #/AREA URNS AUTO: ABNORMAL /HPF
BASOPHILS # BLD AUTO: 0.02 X10(3)/MCL
BASOPHILS NFR BLD AUTO: 0.2 %
BILIRUB SERPL-MCNC: 0.5 MG/DL
BILIRUB UR QL STRIP.AUTO: NEGATIVE
BUN SERPL-MCNC: 35.9 MG/DL (ref 8.4–25.7)
CALCIUM SERPL-MCNC: 9.7 MG/DL (ref 8.8–10)
CHLORIDE SERPL-SCNC: 105 MMOL/L (ref 98–107)
CLARITY UR: CLEAR
CO2 SERPL-SCNC: 24 MMOL/L (ref 23–31)
COLOR UR AUTO: ABNORMAL
CREAT SERPL-MCNC: 1.4 MG/DL (ref 0.73–1.18)
CREAT/UREA NIT SERPL: 26
EOSINOPHIL # BLD AUTO: 0.05 X10(3)/MCL (ref 0–0.9)
EOSINOPHIL NFR BLD AUTO: 0.5 %
ERYTHROCYTE [DISTWIDTH] IN BLOOD BY AUTOMATED COUNT: 19.7 % (ref 11.5–17)
GFR SERPLBLD CREATININE-BSD FMLA CKD-EPI: 55 ML/MIN/1.73/M2
GLOBULIN SER-MCNC: 4 GM/DL (ref 2.4–3.5)
GLUCOSE SERPL-MCNC: 90 MG/DL (ref 82–115)
GLUCOSE UR QL STRIP: NORMAL
HCT VFR BLD AUTO: 38.5 % (ref 42–52)
HGB BLD-MCNC: 12.4 G/DL (ref 14–18)
HGB UR QL STRIP: NEGATIVE
HOLD SPECIMEN: NORMAL
HOLD SPECIMEN: NORMAL
HYALINE CASTS #/AREA URNS LPF: ABNORMAL /LPF
IMM GRANULOCYTES # BLD AUTO: 0.02 X10(3)/MCL (ref 0–0.04)
IMM GRANULOCYTES NFR BLD AUTO: 0.2 %
KETONES UR QL STRIP: NEGATIVE
LEUKOCYTE ESTERASE UR QL STRIP: 500
LYMPHOCYTES # BLD AUTO: 2.71 X10(3)/MCL (ref 0.6–4.6)
LYMPHOCYTES NFR BLD AUTO: 27.5 %
MCH RBC QN AUTO: 26.5 PG (ref 27–31)
MCHC RBC AUTO-ENTMCNC: 32.2 G/DL (ref 33–36)
MCV RBC AUTO: 82.3 FL (ref 80–94)
MONOCYTES # BLD AUTO: 0.92 X10(3)/MCL (ref 0.1–1.3)
MONOCYTES NFR BLD AUTO: 9.3 %
NEUTROPHILS # BLD AUTO: 6.14 X10(3)/MCL (ref 2.1–9.2)
NEUTROPHILS NFR BLD AUTO: 62.3 %
NITRITE UR QL STRIP: NEGATIVE
NRBC BLD AUTO-RTO: 0 %
OHS QRS DURATION: 94 MS
OHS QTC CALCULATION: 443 MS
PH UR STRIP: 5.5 [PH]
PLATELET # BLD AUTO: 294 X10(3)/MCL (ref 130–400)
PMV BLD AUTO: 10.1 FL (ref 7.4–10.4)
POTASSIUM SERPL-SCNC: 3.8 MMOL/L (ref 3.5–5.1)
PROT SERPL-MCNC: 7.7 GM/DL (ref 5.8–7.6)
PROT UR QL STRIP: NEGATIVE
RBC # BLD AUTO: 4.68 X10(6)/MCL (ref 4.7–6.1)
RBC #/AREA URNS AUTO: ABNORMAL /HPF
SODIUM SERPL-SCNC: 141 MMOL/L (ref 136–145)
SP GR UR STRIP.AUTO: 1.01 (ref 1–1.03)
SQUAMOUS #/AREA URNS LPF: ABNORMAL /HPF
TROPONIN I SERPL-MCNC: 0.01 NG/ML (ref 0–0.04)
UROBILINOGEN UR STRIP-ACNC: NORMAL
WBC # BLD AUTO: 9.86 X10(3)/MCL (ref 4.5–11.5)
WBC #/AREA URNS AUTO: ABNORMAL /HPF

## 2024-08-26 PROCEDURE — 80053 COMPREHEN METABOLIC PANEL: CPT | Performed by: INTERNAL MEDICINE

## 2024-08-26 PROCEDURE — 85025 COMPLETE CBC W/AUTO DIFF WBC: CPT | Performed by: INTERNAL MEDICINE

## 2024-08-26 PROCEDURE — 63600175 PHARM REV CODE 636 W HCPCS: Performed by: INTERNAL MEDICINE

## 2024-08-26 PROCEDURE — 87186 SC STD MICRODIL/AGAR DIL: CPT | Performed by: INTERNAL MEDICINE

## 2024-08-26 PROCEDURE — 84484 ASSAY OF TROPONIN QUANT: CPT | Performed by: INTERNAL MEDICINE

## 2024-08-26 PROCEDURE — 99284 EMERGENCY DEPT VISIT MOD MDM: CPT | Mod: 25

## 2024-08-26 PROCEDURE — 81015 MICROSCOPIC EXAM OF URINE: CPT | Performed by: INTERNAL MEDICINE

## 2024-08-26 PROCEDURE — 93005 ELECTROCARDIOGRAM TRACING: CPT

## 2024-08-26 PROCEDURE — 87086 URINE CULTURE/COLONY COUNT: CPT | Performed by: INTERNAL MEDICINE

## 2024-08-26 PROCEDURE — 99215 OFFICE O/P EST HI 40 MIN: CPT | Mod: PBBFAC,25,27

## 2024-08-26 RX ORDER — SODIUM CHLORIDE, SODIUM LACTATE, POTASSIUM CHLORIDE, CALCIUM CHLORIDE 600; 310; 30; 20 MG/100ML; MG/100ML; MG/100ML; MG/100ML
1000 INJECTION, SOLUTION INTRAVENOUS
Status: COMPLETED | OUTPATIENT
Start: 2024-08-26 | End: 2024-08-26

## 2024-08-26 RX ORDER — AMOXICILLIN AND CLAVULANATE POTASSIUM 500; 125 MG/1; MG/1
1 TABLET, FILM COATED ORAL 2 TIMES DAILY
Qty: 20 TABLET | Refills: 0 | Status: SHIPPED | OUTPATIENT
Start: 2024-08-26 | End: 2024-09-05

## 2024-08-26 RX ADMIN — SODIUM CHLORIDE, POTASSIUM CHLORIDE, SODIUM LACTATE AND CALCIUM CHLORIDE 1000 ML: 600; 310; 30; 20 INJECTION, SOLUTION INTRAVENOUS at 02:08

## 2024-08-26 NOTE — ED PROVIDER NOTES
"Encounter Date: 8/26/2024       History     Chief Complaint   Patient presents with    Hypotension     Patient presents to the ed (via Internal Medicine) secondary to "low blood pressure". Current reading=96/62. States history of chf, a-fib, and feels a "tightness" in fore head. Denies chest pain and sob at this time. 12 lead EKG pending.       Presents from [facility] due to hypotension. Pt states went to clinic for his appointment and his BP was low. States some lightheadedness, otherwise feeling normal. States took all his medications today AM. Denies nausea, vomiting, diarrhea or pain.     The history is provided by the patient.     Review of patient's allergies indicates:  No Known Allergies  Past Medical History:   Diagnosis Date    A-fib     Anticoagulant long-term use     Aortic aneurysm     Cataract     CHF (congestive heart failure)     Chronic atrial fibrillation     COPD (chronic obstructive pulmonary disease)     Coronary artery disease     HLD (hyperlipidemia)     Hypertension     Mitral regurgitation     PAD (peripheral artery disease)     Primary open angle glaucoma (POAG)      Past Surgical History:   Procedure Laterality Date    ANGIOGRAM, CORONARY, WITH LEFT HEART CATHETERIZATION N/A 3/26/2024    Procedure: Angiogram, Coronary, with Left Heart Cath;  Surgeon: Adriano Montiel MD;  Location: [facility] CATH LAB;  Service: Cardiology;  Laterality: N/A;    ATHERECTOMY OF PERIPHERAL VESSEL Left 09/12/2022    LEFT SFA ATHERECTOMY, BALLOON ANGIOPLASTY    CATARACT EXTRACTION W/  INTRAOCULAR LENS IMPLANT Left 3/9/2023    Procedure: EXTRACTION, CATARACT, WITH IOL INSERTION;  Surgeon: Georgi Borja MD;  Location: [facility] OR;  Service: Ophthalmology;  Laterality: Left;  19.5  mac    EGD, WITH CLOSED BIOPSY  3/22/2024    Procedure: EGD, WITH CLOSED BIOPSY;  Surgeon: Ronald Calderon MD;  Location: [facility] ENDOSCOPY;  Service: Gastroenterology;;    ESOPHAGOGASTRODUODENOSCOPY N/A 3/22/2024    Procedure: EGD;  " Surgeon: Ronald Calderon MD;  Location: [facility] ENDOSCOPY;  Service: Gastroenterology;  Laterality: N/A;    Heart Stent N/A     > 10yrs. AGO    INCISION AND DRAINAGE N/A 3/18/2024    Procedure: Incision and Drainage;  Surgeon: Fahad Rivas MD;  Location: [facility] OR;  Service: Urology;  Laterality: N/A;  I&D SCROTAL ABSCESS    INSERTION OF STENT INTO PERIPHERAL VESSEL Right 10/17/2022    RIGHT SFA ATHERECTOMY, BALLOON ANGIOPLASTY, STENT; RIGHT ANTERIOR TIBIAL ARTERY ATHERECTOMY, BALLOON ANGIOPLASTY    ORCHIECTOMY Left 3/18/2024    Procedure: ORCHIECTOMY;  Surgeon: Fahad Rivas MD;  Location: [facility] OR;  Service: Urology;  Laterality: Left;     Family History   Problem Relation Name Age of Onset    Cancer Mother      Hypertension Mother      Heart failure Father      Stroke Father      Hypertension Father      No Known Problems Sister       Social History     Tobacco Use    Smoking status: Every Day     Current packs/day: 0.25     Average packs/day: 0.3 packs/day for 40.0 years (10.0 ttl pk-yrs)     Types: Cigarettes     Passive exposure: Current    Smokeless tobacco: Never    Tobacco comments:     States smoke 2-3 cigarettes per day   Substance Use Topics    Alcohol use: Yes     Alcohol/week: 3.0 standard drinks of alcohol     Types: 3 Cans of beer per week     Comment: socially    Drug use: Not Currently     Frequency: 1.0 times per week     Types: Marijuana     Comment: no use in over 1 year     Review of Systems   Neurological:  Positive for light-headedness.       Physical Exam     Initial Vitals [08/26/24 1324]   BP Pulse Resp Temp SpO2   96/62 89 (!) 22 98.4 °F (36.9 °C) 98 %      MAP       --         Physical Exam    Nursing note and vitals reviewed.  Constitutional: He appears well-developed and well-nourished. No distress.   HENT:   Head: Normocephalic and atraumatic.   Mouth/Throat: Oropharynx is clear and moist.   Eyes: Conjunctivae and EOM are normal. Pupils are equal, round, and  reactive to light.   Neck: Neck supple. No JVD present.   Normal range of motion.  Cardiovascular:  Normal rate, normal heart sounds and intact distal pulses.           Irregular rhythm   Pulmonary/Chest: Breath sounds normal. No respiratory distress.   Abdominal: Abdomen is soft. Bowel sounds are normal. He exhibits no distension. There is no abdominal tenderness. There is no rebound and no guarding.   Musculoskeletal:         General: No edema. Normal range of motion.      Cervical back: Normal range of motion and neck supple.     Neurological: He is alert and oriented to person, place, and time. He has normal strength. GCS score is 15. GCS eye subscore is 4. GCS verbal subscore is 5. GCS motor subscore is 6.   Skin: Skin is warm and dry. No rash noted.   Psychiatric: His behavior is normal.         ED Course   Procedures  Labs Reviewed   COMPREHENSIVE METABOLIC PANEL - Abnormal       Result Value    Sodium 141      Potassium 3.8      Chloride 105      CO2 24      Glucose 90      Blood Urea Nitrogen 35.9 (*)     Creatinine 1.40 (*)     Calcium 9.7      Protein Total 7.7 (*)     Albumin 3.7      Globulin 4.0 (*)     Albumin/Globulin Ratio 0.9 (*)     Bilirubin Total 0.5            ALT 23      AST 20      eGFR 55      Anion Gap 12.0      BUN/Creatinine Ratio 26     URINALYSIS, REFLEX TO URINE CULTURE - Abnormal    Color, UA Light-Yellow      Appearance, UA Clear      Specific Gravity, UA 1.015      pH, UA 5.5      Protein, UA Negative      Glucose, UA Normal      Ketones, UA Negative      Blood, UA Negative      Bilirubin, UA Negative      Urobilinogen, UA Normal      Nitrites, UA Negative      Leukocyte Esterase,  (*)     RBC, UA 0-5      WBC, UA 51-99 (*)     Bacteria, UA Moderate (*)     Squamous Epithelial Cells, UA Trace (*)     Hyaline Casts, UA None Seen     CBC WITH DIFFERENTIAL - Abnormal    WBC 9.86      RBC 4.68 (*)     Hgb 12.4 (*)     Hct 38.5 (*)     MCV 82.3      MCH 26.5 (*)     MCHC  32.2 (*)     RDW 19.7 (*)     Platelet 294      MPV 10.1      Neut % 62.3      Lymph % 27.5      Mono % 9.3      Eos % 0.5      Basophil % 0.2      Lymph # 2.71      Neut # 6.14      Mono # 0.92      Eos # 0.05      Baso # 0.02      IG# 0.02      IG% 0.2      NRBC% 0.0     TROPONIN I - Normal    Troponin-I 0.013     CULTURE, URINE   CBC W/ AUTO DIFFERENTIAL    Narrative:     The following orders were created for panel order CBC auto differential.  Procedure                               Abnormality         Status                     ---------                               -----------         ------                     CBC with Differential[[id number]]       Abnormal            Final result                 Please view results for these tests on the individual orders.   EXTRA TUBES    Narrative:     The following orders were created for panel order EXTRA TUBES.  Procedure                               Abnormality         Status                     ---------                               -----------         ------                     Light Blue Top Hold[[id number]]                             Final result               Gold Top Hold[[id number]]                                   Final result                 Please view results for these tests on the individual orders.   LIGHT BLUE TOP HOLD    Extra Tube Hold for add-ons.     GOLD TOP HOLD    Extra Tube Hold for add-ons.       EKG Readings: (Independently Interpreted)   Initial Reading: No STEMI. Rhythm: Atrial Fibrillation. Heart Rate: 71. Ectopy: Rare PVCs. ST Segments: Non-Specific ST Segment Depression. T Waves: Normal. Axis: Normal. Clinical Impression: Atrial Fibrillation with PVCs     ECG Results              EKG 12-lead (Syncope) Age >50 (In process)        Collection Time Result Time QRS Duration [facility] QTC Calculation    08/26/24 13:39:02 08/26/24 13:41:56 94 443                     In process by Interface, Lab In [facility] (08/26/24 13:42:01)                    Narrative:    Test Reason : R55,    Vent. Rate : 071 BPM     Atrial Rate : 416 BPM     P-R Int : 000 ms          QRS Dur : 094 ms      QT Int : 408 ms       P-R-T Axes : 000 064 102 degrees     QTc Int : 443 ms    Atrial fibrillation with premature ventricular or aberrantly conducted  complexes  Nonspecific ST and T wave abnormality  Abnormal ECG  When compared with ECG of 14-AUG-2024 11:49,  Left anterior fascicular block is no longer Present    Referred By:             Confirmed By:                                   Imaging Results    None          Medications   lactated ringers infusion (1,000 mLs Intravenous New Bag 8/26/24 1411)   lactated ringers bolus 500 mL (500 mLs Intravenous Bolus from Bag 8/26/24 1513)     Medical Decision Making  Amount and/or Complexity of Data Reviewed  Labs: ordered. Decision-making details documented in ED Course.  ECG/medicine tests: ordered and independent interpretation performed. Decision-making details documented in ED Course.    Risk  Prescription drug management.      Additional MDM:   Differential Diagnosis:   Hypothyroidism, medication side effect, orthostatic weakness, kidney failure, stroke, among others                                      Clinical Impression:  Final diagnoses:  [R55] Syncope  [R53.1] Weakness  [N17.9] ELIZABETH (acute kidney injury) (Primary)  [N30.00] Acute cystitis without hematuria          ED Disposition Condition    Discharge Fair          ED Prescriptions       Medication Sig Dispense Start Date End Date Auth. Provider    amoxicillin-clavulanate 500-125mg (AUGMENTIN) 500-125 mg Tab Take 1 tablet (500 mg total) by mouth 2 (two) times daily. for 10 days 20 tablet 8/26/2024 9/5/2024 Obie Wright MD          Follow-up Information       Follow up With Specialties Details Why Contact Info    Abiodun Recinos DO Internal Medicine In 2 weeks  [address] [facility] [address]  [phone]      [facility] Emergency  Medicine  If symptoms worsen [address] [facility] [id number] [phone]             Obie Wright MD  08/26/24 7197

## 2024-08-28 LAB — BACTERIA UR CULT: ABNORMAL

## 2024-09-03 ENCOUNTER — OFFICE VISIT (OUTPATIENT)
Dept: CARDIOLOGY | Facility: CLINIC | Age: 69
End: 2024-09-03
Payer: MEDICARE

## 2024-09-03 VITALS
OXYGEN SATURATION: 100 % | BODY MASS INDEX: 27.99 KG/M2 | WEIGHT: 206.63 LBS | HEIGHT: 72 IN | HEART RATE: 72 BPM | SYSTOLIC BLOOD PRESSURE: 116 MMHG | DIASTOLIC BLOOD PRESSURE: 73 MMHG | TEMPERATURE: 98 F | RESPIRATION RATE: 20 BRPM

## 2024-09-03 DIAGNOSIS — I50.20 HFREF (HEART FAILURE WITH REDUCED EJECTION FRACTION): ICD-10-CM

## 2024-09-03 DIAGNOSIS — I48.20 CHRONIC ATRIAL FIBRILLATION: ICD-10-CM

## 2024-09-03 DIAGNOSIS — I25.10 ARTERIOSCLEROSIS OF CORONARY ARTERY: Primary | ICD-10-CM

## 2024-09-03 DIAGNOSIS — E78.5 DYSLIPIDEMIA: ICD-10-CM

## 2024-09-03 DIAGNOSIS — I73.9 PVD (PERIPHERAL VASCULAR DISEASE): ICD-10-CM

## 2024-09-03 DIAGNOSIS — I38 VHD (VALVULAR HEART DISEASE): ICD-10-CM

## 2024-09-03 PROBLEM — I50.9 CONGESTIVE HEART FAILURE: Status: RESOLVED | Noted: 2022-06-02 | Resolved: 2024-09-03

## 2024-09-03 PROCEDURE — 99215 OFFICE O/P EST HI 40 MIN: CPT | Mod: PBBFAC | Performed by: INTERNAL MEDICINE

## 2024-09-03 RX ORDER — CLOPIDOGREL BISULFATE 75 MG/1
75 TABLET ORAL DAILY
COMMUNITY

## 2024-09-03 NOTE — PROGRESS NOTES
[facility]  Cardiology Clinic      Date of Visit: 9/3/2024  Reason for Visit/Chief Complaint:   Chief Complaint    Follow-up; Shortness of Breath          The patient was discussed with Dr. Verduzco who agrees with the assessment and plan.        History of Present Illness:        Ran Reyes Jr. is a 68 y.o. male with a PMHx of CHF (EF 22%), chronic a fib on Xarelto, HTN, PVD, COPD on 2 L home oxygen PRN, dyslipidemia, arteriosclerosis of coronary artery, mitral valve regurgitation, tobacco use, secondary hyperaldosteronism, positive Cologuard, and primary open angle glaucoma who presents to cardiology clinic for evaluation/follow up of recent hospital admission for CHF.    Pt went to the ED after a primary visit on 8/26/24 secondary to low blood pressure (reading in ED 96/62) and some lightheadedness. During ED course, urinary analysis showed >/= 100,000 colonies/ml ESBL. Pt also reports electrolyte disturbances including low K and Mg. Pt also had multiple prior ED visits including 8/13/24 with a chief complaint of cough, SOB, and abdominal swelling for 2 days. Pt denies current SOB, abdominal swelling, dizziness, chest pain, palpitations, weakness, bloating, and PND. Pt reports intermittent coughing, wheezing, orthopnea, SOB on exertion (avoids stairs; unable to walk 1 block without SOB) and positional changes such as leaning forward which is relieved upon standing, and some numbness in the feet (R>L).  During last hospital stay, metoprolol was discontinued due to hypotension. Pt recently put on Entresto on 8/21/24. Pt was told he needs a defibrillator; this was recently canceled due to low K.        PMHx:    Past Medical History:   Diagnosis Date    A-fib     Anticoagulant long-term use     Aortic aneurysm     Cataract     CHF (congestive heart failure)     Chronic atrial fibrillation     COPD (chronic obstructive pulmonary disease)      Coronary artery disease     HLD (hyperlipidemia)     Hypertension     Mitral regurgitation     PAD (peripheral artery disease)     Primary open angle glaucoma (POAG)       SurgicalHx:   Past Surgical History:   Procedure Laterality Date    ANGIOGRAM, CORONARY, WITH LEFT HEART CATHETERIZATION N/A 3/26/2024    Procedure: Angiogram, Coronary, with Left Heart Cath;  Surgeon: Adriano Montiel MD;  Location: [facility] CATH LAB;  Service: Cardiology;  Laterality: N/A;    ATHERECTOMY OF PERIPHERAL VESSEL Left 09/12/2022    LEFT SFA ATHERECTOMY, BALLOON ANGIOPLASTY    CATARACT EXTRACTION W/  INTRAOCULAR LENS IMPLANT Left 3/9/2023    Procedure: EXTRACTION, CATARACT, WITH IOL INSERTION;  Surgeon: Georgi Borja MD;  Location: [facility];  Service: Ophthalmology;  Laterality: Left;  19.5  mac    EGD, WITH CLOSED BIOPSY  3/22/2024    Procedure: EGD, WITH CLOSED BIOPSY;  Surgeon: Ronald Calderon MD;  Location: [facility] ENDOSCOPY;  Service: Gastroenterology;;    ESOPHAGOGASTRODUODENOSCOPY N/A 3/22/2024    Procedure: EGD;  Surgeon: Ronald Calderon MD;  Location: [facility] ENDOSCOPY;  Service: Gastroenterology;  Laterality: N/A;    Heart Stent N/A     > 10yrs. AGO    INCISION AND DRAINAGE N/A 3/18/2024    Procedure: Incision and Drainage;  Surgeon: Fahad Rivas MD;  Location: [facility];  Service: Urology;  Laterality: N/A;  I&D SCROTAL ABSCESS    INSERTION OF STENT INTO PERIPHERAL VESSEL Right 10/17/2022    RIGHT SFA ATHERECTOMY, BALLOON ANGIOPLASTY, STENT; RIGHT ANTERIOR TIBIAL ARTERY ATHERECTOMY, BALLOON ANGIOPLASTY    ORCHIECTOMY Left 3/18/2024    Procedure: ORCHIECTOMY;  Surgeon: Fahad Rivas MD;  Location: [facility];  Service: Urology;  Laterality: Left;      FamilyHx: family history includes Cancer in his mother; Heart failure in his father; Hypertension in his father and mother; No Known Problems in his sister; Stroke in his father.   SocialHx: Off and on smoking; occasional alcohol including beet   reports that he has been smoking cigarettes. He has a 10 pack-year smoking history. He has been exposed to tobacco smoke. He has never used smokeless tobacco. He reports current alcohol use of about 3.0 standard drinks of alcohol per week. He reports that he does not currently use drugs after having used the following drugs: Marijuana. Frequency: 1.00 time per week.   Allergies: NKDA  Home Medications:   Current Outpatient Medications   Medication Sig    albuterol-ipratropium (DUO-NEB) 2.5 mg-0.5 mg/3 mL nebulizer solution Take 3 mLs by nebulization every 6 (six) hours as needed for Wheezing. Rescue    amoxicillin-clavulanate 500-125mg (AUGMENTIN) 500-125 mg Tab Take 1 tablet (500 mg total) by mouth 2 (two) times daily. for 10 days    aspirin (ECOTRIN) 81 MG EC tablet Take 1 tablet (81 mg total) by mouth once daily.    atorvastatin (LIPITOR) 80 MG tablet Take 1 tablet (80 mg total) by mouth once daily.    cetirizine (ZYRTEC) 10 MG tablet Take 1 tablet (10 mg total) by mouth once daily.    clopidogreL (PLAVIX) 75 mg tablet Take 75 mg by mouth once daily.    folic acid (FOLVITE) 1 MG tablet Take 1 tablet (1 mg total) by mouth once daily.    furosemide (LASIX) 40 MG tablet Take 1 tablet (40 mg total) by mouth 2 (two) times a day.    midodrine (PROAMATINE) 10 MG tablet Take 1 tablet (10 mg total) by mouth 3 (three) times daily with meals.    pantoprazole (PROTONIX) 40 MG tablet Take 1 tablet (40 mg total) by mouth 2 (two) times a day.    potassium chloride SA (K-DUR,KLOR-CON) 20 MEQ tablet Take 1 tablet (20 mEq total) by mouth 2 (two) times daily.    rivaroxaban (XARELTO) 20 mg Tab Take 1 tablet (20 mg total) by mouth Daily.    sacubitriL-valsartan (ENTRESTO) 49-51 mg per tablet Take 1 tablet by mouth 2 (two) times daily.    triamcinolone acetonide 0.1% (KENALOG) 0.1 % cream Apply topically Daily.    hydrOXYzine HCL (ATARAX) 10 MG Tab Take 50 mg by mouth 2 (two) times daily as needed. (Patient not taking: Reported on  8/26/2024)    metoprolol succinate (TOPROL-XL) 25 MG 24 hr tablet Take 0.5 tablets (12.5 mg total) by mouth once daily. (Patient not taking: Reported on 9/3/2024)    spironolactone (ALDACTONE) 50 MG tablet Take 1 tablet (50 mg total) by mouth once daily.    varenicline (CHANTIX STARTING MONTH BOX) 0.5 mg (11)- 1 mg (42) tablet Take one 0.5mg tab by mouth once daily X3 days,then increase to one 0.5mg tab twice daily X4 days,then increase to one 1mg tab twice daily (Patient not taking: Reported on 8/26/2024)     No current facility-administered medications for this visit.     Facility-Administered Medications Ordered in Other Visits   Medication Frequency    0.9%  NaCl infusion Continuous    LIDOcaine (PF) 10 mg/ml (1%) injection 10 mg Once    LIDOcaine (PF) 10 mg/ml (1%) injection 10 mg Once    ondansetron injection 4 mg Once    prochlorperazine injection Soln 5 mg Once PRN      Current Outpatient Medications   Medication Instructions    albuterol-ipratropium (DUO-NEB) 2.5 mg-0.5 mg/3 mL nebulizer solution 3 mLs, Nebulization, Every 6 hours PRN, Rescue    amoxicillin-clavulanate 500-125mg (AUGMENTIN) 500-125 mg Tab 500 mg, Oral, 2 times daily    aspirin (ECOTRIN) 81 mg, Oral, Daily    atorvastatin (LIPITOR) 80 mg, Oral, Daily    cetirizine (ZYRTEC) 10 mg, Oral, Daily    clopidogreL (PLAVIX) 75 mg, Oral, Daily    folic acid (FOLVITE) 1 mg, Oral, Daily    furosemide (LASIX) 40 mg, Oral, 2 times daily    hydrOXYzine HCL (ATARAX) 50 mg, 2 times daily PRN    metoprolol succinate (TOPROL-XL) 12.5 mg, Oral, Daily    midodrine (PROAMATINE) 10 mg, Oral, 3 times daily with meals    pantoprazole (PROTONIX) 40 mg, Oral, 2 times daily    potassium chloride SA (K-DUR,KLOR-CON) 20 MEQ tablet 20 mEq, Oral, 2 times daily    rivaroxaban (XARELTO) 20 mg, Oral, Daily    sacubitriL-valsartan (ENTRESTO) 49-51 mg per tablet 1 tablet, Oral, 2 times daily    spironolactone (ALDACTONE) 50 mg, Oral, Daily    triamcinolone acetonide 0.1%  (KENALOG) 0.1 % cream Topical (Top), Daily    varenicline (CHANTIX STARTING MONTH BOX) 0.5 mg (11)- 1 mg (42) tablet Take one 0.5mg tab by mouth once daily X3 days,then increase to one 0.5mg tab twice daily X4 days,then increase to one 1mg tab twice daily                Objective:     Wt Readings from Last 3 Encounters:   09/03/24 93.7 kg (206 lb 9.6 oz)   08/26/24 93 kg (205 lb 0.4 oz)   08/26/24 93.4 kg (206 lb)     Temp Readings from Last 3 Encounters:   09/03/24 98.2 °F (36.8 °C) (Oral)   08/26/24 98.4 °F (36.9 °C) (Oral)   08/26/24 98.1 °F (36.7 °C) (Oral)     BP Readings from Last 3 Encounters:   09/03/24 116/73   08/26/24 101/77   08/26/24 (!) 78/50     Pulse Readings from Last 3 Encounters:   09/03/24 72   08/26/24 70   08/26/24 (!) 59       Vitals:    09/03/24 0858   BP: 116/73   BP Location: Right arm   Patient Position: Sitting   BP Method: X-Large (Automatic)   Pulse: 72   Resp: 20   Temp: 98.2 °F (36.8 °C)   TempSrc: Oral   SpO2: 100%   Weight: 93.7 kg (206 lb 9.6 oz)   Height: 6' (1.829 m)     Body mass index is 28.02 kg/m².    Physical Examination:  Nursing note and vital signs reviewed.   Constitutional: NAD, not ill-appearing  HEENT: NCAT, PERRLA, normal conjunctivae  Cardiovascular: RRR, no murmurs noted, no chest tenderness to palpation, normal pulses in radial & dorsalis pedis bilaterally   Pulmonary: normal respiratory effort, CTAB, no crackles, wheezes  Abdominal: S/NT/ND  Musculoskeletal: No edema noted to bilateral lower extremities      Neurological: Alert & oriented to self, location, time and situation. At baseline neurological function.  Skin: warm & dry    Labs:   I have reviewed the following labs below:      CBC:  Lab Results   Component Value Date    WBC 9.86 08/26/2024    HGB 12.4 (L) 08/26/2024    HCT 38.5 (L) 08/26/2024     08/26/2024    MCV 82.3 08/26/2024    RDW 19.7 (H) 08/26/2024     BMP:  Lab Results   Component Value Date     08/26/2024    K 3.8 08/26/2024    CL  105 08/26/2024    CO2 24 08/26/2024    BUN 35.9 (H) 08/26/2024    CALCIUM 9.7 08/26/2024    MG 2.10 08/18/2024    PHOS 2.7 04/28/2024     LFTs:  Lab Results   Component Value Date    ALBUMIN 3.7 08/26/2024    BILITOT 0.5 08/26/2024    AST 20 08/26/2024    ALKPHOS 113 08/26/2024    ALT 23 08/26/2024     FLP:  Cholesterol Total   Date Value Ref Range Status   08/14/2024 96 <=200 mg/dL Final     HDL Cholesterol   Date Value Ref Range Status   08/14/2024 32 (L) 35 - 60 mg/dL Final     LDL Cholesterol   Date Value Ref Range Status   08/14/2024 49.00 (L) 50.00 - 140.00 mg/dL Final     Triglyceride   Date Value Ref Range Status   08/14/2024 73 34 - 140 mg/dL Final     DM:  Lab Results   Component Value Date    HGBA1C 5.0 03/18/2024    HGBA1C 4.7 11/15/2021    HGBA1C 5.7 10/07/2020    CREATININE 1.40 (H) 08/26/2024     Thyroid:  Lab Results   Component Value Date    TSH 1.097 03/18/2024     Anemia:  Lab Results   Component Value Date    IRON 27 (L) 03/18/2024    TIBC 151 (L) 03/18/2024    FERRITIN 1,101.88 (H) 03/18/2024    [id number] 1,078 (H) 03/18/2024    FOLATE 10.0 03/18/2024     Coags:  Lab Results   Component Value Date    INR 2.7 (H) 06/11/2024    APTT 48.8 (H) 06/11/2024      Cardiac:  Lab Results   Component Value Date    TROPONINI 0.013 08/26/2024    TROPONINI 0.030 08/13/2024    TROPONINI 0.025 08/13/2024    TROPONINI 0.011 07/16/2024    TROPONINI 0.011 06/14/2024    BNP 2,531.8 (H) 08/13/2024    BNP 1,219.7 (H) 07/16/2024    .0 (H) 06/14/2024       Cardiovascular Studies/Imaging:   I have reviewed the following studies below:      ECG   Results for orders placed or performed during the hospital encounter of 08/26/24   EKG 12-lead (Syncope) Age >50    Collection Time: 08/26/24  1:39 PM   Result Value Ref Range    QRS Duration 94 ms    OHS QTC Calculation 443 ms    Narrative    Test Reason : R55,    Vent. Rate : 071 BPM     Atrial Rate : 416 BPM     P-R Int : 000 ms          QRS Dur : 094 ms      QT Int :  408 ms       P-R-T Axes : 000 064 102 degrees     QTc Int : 443 ms    Atrial fibrillation with premature ventricular or aberrantly conducted  complexes  Nonspecific ST and T wave abnormality  Abnormal ECG  Confirmed by Hugo Silva MD ([id number]) on 8/26/2024 8:13:57 PM    Referred By:             Confirmed By:Hugo Silva MD        Echo   Results for orders placed during the hospital encounter of 08/13/24    Echo    Interpretation Summary    Left Ventricle: The left ventricle is mildly dilated. There is severely reduced systolic function. Biplane (2D) method of discs ejection fraction is 22%.    Right Ventricle: Mild right ventricular enlargement. Systolic function is mildly reduced.    Left Atrium: Left atrium is moderately dilated.    Right Atrium: Right atrium is severely dilated.    Aortic Valve: The aortic valve is a trileaflet valve. There is no stenosis. Aortic valve peak velocity is 1.43 m/s. Mean gradient is 4 mmHg.    Mitral Valve: The mitral valve is structurally normal. The mean pressure gradient across the mitral valve is 3 mmHg at a heart rate of  bpm. There is moderate to severe regurgitation.    Tricuspid Valve: There is mild to moderate regurgitation.    Pulmonic Valve: There is mild regurgitation.    Pulmonary Artery: The estimated pulmonary artery systolic pressure is 51 mmHg.    IVC/SVC: Elevated venous pressure at 15 mmHg.      Stress Testing   No results found for this or any previous visit.       Coronary Angiogram   Results for orders placed during the hospital encounter of 03/17/24    Cardiac catheterization    Conclusion  The procedure log was documented by No documenter listed and verified by Adriano Montiel MD.    Procedure:  Selective coronary angiography  Moderate (Conscious) Sedation      Indication: VT arrest, known cardiomyopathy, NSTEMI  Consent: The patient was brought to the cardiac catheterization lab. Was instructed and explained about the risk, benefit and alternatives of the procedure  included but not limited to sudden cardiac death, myocardial infarction, bleeding, vascular injury, renal failure, stroke, contrast allergy, risk of conscious sedation and need for emergent bypass surgery.  the patient was agreeable to proceed.  Signed the consent form.  Access:  The patient was prepped using the usual sterile fashion.  Right radial artery  was accessed with micropuncture technique, ultrasound guidance.  An image of the ultrasound was put in the paper chart for purpose of documentation.  Sheath size:6F    Kirill test:  Verified with pulse oximetry deemed to be adequate for radial artery procedure.    Diagnostic catheters : TIG, JL 3.5    Coronary findings:    Dominance: right  Left main:  10-20% calcified distal left main disease  Left anterior descending artery:  50% calcified proximal stenosis  Circumflex artery:  Luminal irregularities  Right coronary artery:  Luminal irregularities    Access Closure:  At the end of the procedure the sheath was removed. A TR band applied to the right radial artery . Excellent hemostasis achieved.       Images:  Echo    Result Date: 8/14/2024    Left Ventricle: The left ventricle is mildly dilated. There is severely reduced systolic function. Biplane (2D) method of discs ejection fraction is 22%.   Right Ventricle: Mild right ventricular enlargement. Systolic function is mildly reduced.   Left Atrium: Left atrium is moderately dilated.   Right Atrium: Right atrium is severely dilated.   Aortic Valve: The aortic valve is a trileaflet valve. There is no stenosis. Aortic valve peak velocity is 1.43 m/s. Mean gradient is 4 mmHg.   Mitral Valve: The mitral valve is structurally normal. The mean pressure gradient across the mitral valve is 3 mmHg at a heart rate of  bpm. There is moderate to severe regurgitation.   Tricuspid Valve: There is mild to moderate regurgitation.   Pulmonic Valve: There is mild regurgitation.   Pulmonary Artery: The estimated pulmonary artery  systolic pressure is 51 mmHg.   IVC/SVC: Elevated venous pressure at 15 mmHg.     X-Ray Chest PA And Lateral    Result Date: 8/13/2024  EXAMINATION: XR CHEST PA AND LATERAL CLINICAL HISTORY: Chest Pain; TECHNIQUE: Two-view COMPARISON: July 16, 2024. FINDINGS: Cardiopericardial silhouette enlarged appearance similar.  No dense focal or segmental consolidation, overt congestion, pleural effusion or pneumothorax.     No acute cardiopulmonary process identified. Electronically signed by: Mikcey West Date:    08/13/2024 Time:    12:55           Assessment/Plan:     Ran Reyes Jr. is a 68 y.o. male with PMHx of CHF, chronic a fib on Xarelto, HTN, PVD, COPD on 2 L home oxygen PRN, dyslipidemia, arteriosclerosis of coronary artery, mitral valve regurgitation, tobacco use, secondary hyperaldosteronism, positive Cologuard, and primary open angle glaucoma who presents to cardiology clinic for evaluation/follow up of recent hospital admission for CHF.    CHF; HFrEF (EF 22%) previously improved EF   A fib on Xarelto  HTN  - Defibrillator deferred recently due to electrolyte disturbances  - BMP in 1 week to evaluate electrolytes (K and Mg)  - Continue newly prescribed medications (Entresto) for at least 3 months; repeat echo  - Continue medications as prescribed  - If repeat echo shows EF over 35%, pt does not need ICD    Follow up to cardiology clinic in 3 months.    Tiffanie Rondon MS-3  [facility] [employer]  09/03/2024

## 2024-09-03 NOTE — PROGRESS NOTES
Cardiology Attending Addendum to Medical Student Note    I evaluated Ran Reyes Jr. and discussed the patient's symptoms, findings, and management plan with the medical student.     Mr. Ran Reyes Jr. is a 68 y.o. male with:   Chief Complaint   Patient presents with    Follow-up     C/o occass. Numbness to both legs and feet    Shortness of Breath     On exertion         HPI  Mr. Ran Reyes Jr. was seen in Cardiology Clinic.  The patient has PMH of CHF (EF 22%), chronic a fib on Xarelto, HTN, PVD, COPD on 2 L home oxygen PRN, dyslipidemia, arteriosclerosis of coronary artery, mitral valve regurgitation, tobacco use, secondary hyperaldosteronism, positive Cologuard, and primary open angle glaucoma who presents to cardiology clinic for evaluation/follow up of recent hospital admission for CHF.     Pt went to the ED after a primary visit on 8/26/24 secondary to low blood pressure (reading in ED 96/62) and some lightheadedness. During ED course, urinary analysis showed >/= 100,000 colonies/ml ESBL. Pt also reports electrolyte disturbances including low K and Mg. Pt also had multiple prior ED visits including 8/13/24 with a chief complaint of cough, SOB, and abdominal swelling for 2 days. Pt denies current SOB, abdominal swelling, dizziness, chest pain, palpitations, weakness, bloating, and PND. Pt reports intermittent coughing, wheezing, orthopnea, SOB on exertion (avoids stairs; unable to walk 1 block without SOB) and positional changes such as leaning forward which is relieved upon standing, and some numbness in the feet (R>L).  During last hospital stay, metoprolol was discontinued due to hypotension. Pt recently put on Entresto on 8/21/24. Pt was told he needs a defibrillator; this was recently canceled due to low K.     ROS:  Please see HPI    PMH:    Patient Active Problem List   Diagnosis    Primary open angle glaucoma (POAG) of right eye, moderate stage    Combined forms of age-related cataract  of left eye    Arteriosclerosis of coronary artery    Chronic atrial fibrillation    Dyslipidemia    Hypertension    Tobacco use    PVD (peripheral vascular disease)    VHD (valvular heart disease)    COVID-19    HFrEF (heart failure with reduced ejection fraction)    Nonrheumatic mitral valve regurgitation    Postoperative eye state    Scrotal hematoma    Chronic obstructive pulmonary disease, unspecified    Lesion of external ear    Low back pain    Other thrombophilia    Secondary hyperaldosteronism    Positive colorectal cancer screening using Cologuard test    Acute heart failure    History of COPD     Surgical Hx:    Past Surgical History:   Procedure Laterality Date    ANGIOGRAM, CORONARY, WITH LEFT HEART CATHETERIZATION N/A 3/26/2024    Procedure: Angiogram, Coronary, with Left Heart Cath;  Surgeon: Adriano Montiel MD;  Location: [facility] CATH LAB;  Service: Cardiology;  Laterality: N/A;    ATHERECTOMY OF PERIPHERAL VESSEL Left 09/12/2022    LEFT SFA ATHERECTOMY, BALLOON ANGIOPLASTY    CATARACT EXTRACTION W/  INTRAOCULAR LENS IMPLANT Left 3/9/2023    Procedure: EXTRACTION, CATARACT, WITH IOL INSERTION;  Surgeon: Georgi Borja MD;  Location: [facility];  Service: Ophthalmology;  Laterality: Left;  19.5  mac    EGD, WITH CLOSED BIOPSY  3/22/2024    Procedure: EGD, WITH CLOSED BIOPSY;  Surgeon: Ronald Calderon MD;  Location: [facility] ENDOSCOPY;  Service: Gastroenterology;;    ESOPHAGOGASTRODUODENOSCOPY N/A 3/22/2024    Procedure: EGD;  Surgeon: Ronald Calderon MD;  Location: [facility] ENDOSCOPY;  Service: Gastroenterology;  Laterality: N/A;    Heart Stent N/A     > 10yrs. AGO    INCISION AND DRAINAGE N/A 3/18/2024    Procedure: Incision and Drainage;  Surgeon: Fahad Rivas MD;  Location: [facility];  Service: Urology;  Laterality: N/A;  I&D SCROTAL ABSCESS    INSERTION OF STENT INTO PERIPHERAL VESSEL Right 10/17/2022    RIGHT SFA ATHERECTOMY, BALLOON ANGIOPLASTY, STENT; RIGHT ANTERIOR TIBIAL  ARTERY ATHERECTOMY, BALLOON ANGIOPLASTY    ORCHIECTOMY Left 3/18/2024    Procedure: ORCHIECTOMY;  Surgeon: Fahad Rivas MD;  Location: [facility];  Service: Urology;  Laterality: Left;       Social History     Socioeconomic History    Marital status: Single   Tobacco Use    Smoking status: Every Day     Current packs/day: 0.25     Average packs/day: 0.3 packs/day for 40.0 years (10.0 ttl pk-yrs)     Types: Cigarettes     Passive exposure: Current    Smokeless tobacco: Never    Tobacco comments:     States smoke 2-3 cigarettes per day   Substance and Sexual Activity    Alcohol use: Yes     Alcohol/week: 3.0 standard drinks of alcohol     Types: 3 Cans of beer per week     Comment: socially    Drug use: Not Currently     Frequency: 1.0 times per week     Types: Marijuana     Comment: no use in over 1 year    Sexual activity: Not Currently     Partners: Female     Social Determinants of Health     Financial Resource Strain: Patient Unable To Answer (8/15/2024)    Overall Financial Resource Strain (CARDIA)     Difficulty of Paying Living Expenses: Patient unable to answer   Food Insecurity: Patient Unable To Answer (8/15/2024)    Hunger Vital Sign     Worried About Running Out of Food in the Last Year: Patient unable to answer     Ran Out of Food in the Last Year: Patient unable to answer   Transportation Needs: No Transportation Needs (8/15/2024)    TRANSPORTATION NEEDS     Transportation : No   Physical Activity: Inactive (11/30/2022)    Exercise Vital Sign     Days of Exercise per Week: 0 days     Minutes of Exercise per Session: 0 min   Stress: Patient Unable To Answer (8/15/2024)    [employer] - Occupational Stress Questionnaire     Feeling of Stress : Patient unable to answer   Housing Stability: Unknown (8/15/2024)    Housing Stability Vital Sign     Unable to Pay for Housing in the Last Year: Patient unable to answer     Homeless in the Last Year: No       Family History    Problem Relation Name Age of Onset    Cancer Mother      Hypertension Mother      Heart failure Father      Stroke Father      Hypertension Father      No Known Problems Sister         Review of patient's allergies indicates:  No Known Allergies    Current Medications:    Current Outpatient Medications   Medication Instructions    albuterol-ipratropium (DUO-NEB) 2.5 mg-0.5 mg/3 mL nebulizer solution 3 mLs, Nebulization, Every 6 hours PRN, Rescue    amoxicillin-clavulanate 500-125mg (AUGMENTIN) 500-125 mg Tab 500 mg, Oral, 2 times daily    aspirin (ECOTRIN) 81 mg, Oral, Daily    atorvastatin (LIPITOR) 80 mg, Oral, Daily    cetirizine (ZYRTEC) 10 mg, Oral, Daily    clopidogreL (PLAVIX) 75 mg, Oral, Daily    folic acid (FOLVITE) 1 mg, Oral, Daily    furosemide (LASIX) 40 mg, Oral, 2 times daily    hydrOXYzine HCL (ATARAX) 50 mg, 2 times daily PRN    metoprolol succinate (TOPROL-XL) 12.5 mg, Oral, Daily    midodrine (PROAMATINE) 10 mg, Oral, 3 times daily with meals    pantoprazole (PROTONIX) 40 mg, Oral, 2 times daily    potassium chloride SA (K-DUR,KLOR-CON) 20 MEQ tablet 20 mEq, Oral, 2 times daily    rivaroxaban (XARELTO) 20 mg, Oral, Daily    sacubitriL-valsartan (ENTRESTO) 49-51 mg per tablet 1 tablet, Oral, 2 times daily    spironolactone (ALDACTONE) 50 mg, Oral, Daily    triamcinolone acetonide 0.1% (KENALOG) 0.1 % cream Topical (Top), Daily    varenicline (CHANTIX STARTING MONTH BOX) 0.5 mg (11)- 1 mg (42) tablet Take one 0.5mg tab by mouth once daily X3 days,then increase to one 0.5mg tab twice daily X4 days,then increase to one 1mg tab twice daily       ROS:  Please see HPI.    BP Readings from Last 3 Encounters:   09/03/24 116/73   08/26/24 101/77   08/26/24 (!) 78/50        Pulse Readings from Last 3 Encounters:   09/03/24 72   08/26/24 70   08/26/24 (!) 59        Temp Readings from Last 3 Encounters:   09/03/24 98.2 °F (36.8 °C) (Oral)   08/26/24 98.4 °F (36.9 °C) (Oral)   08/26/24 98.1 °F (36.7 °C)  (Oral)     Wt Readings from Last 3 Encounters:   09/03/24 93.7 kg (206 lb 9.6 oz)   08/26/24 93 kg (205 lb 0.4 oz)   08/26/24 93.4 kg (206 lb)     BMI:  28.02 kg/m^2    PE  Blood pressure 116/73, pulse 72, temperature 98.2 °F (36.8 °C), temperature source Oral, resp. rate 20, height 6' (1.829 m), weight 93.7 kg (206 lb 9.6 oz), SpO2 100%.  CONSTITUTIONAL:  No acute distress.  Not ill appearing.  GENERAL:  Cooperative  NECK:  supple  RESPIRATIONS:  no apparent distress  ABDOMEN:   obese  SKIN:  dry       CARDIAC TESTS  ECHO  Results for orders placed during the hospital encounter of 08/13/24    Echo    Interpretation Summary    Left Ventricle: The left ventricle is mildly dilated. There is severely reduced systolic function. Biplane (2D) method of discs ejection fraction is 22%.    Right Ventricle: Mild right ventricular enlargement. Systolic function is mildly reduced.    Left Atrium: Left atrium is moderately dilated.    Right Atrium: Right atrium is severely dilated.    Aortic Valve: The aortic valve is a trileaflet valve. There is no stenosis. Aortic valve peak velocity is 1.43 m/s. Mean gradient is 4 mmHg.    Mitral Valve: The mitral valve is structurally normal. The mean pressure gradient across the mitral valve is 3 mmHg at a heart rate of  bpm. There is moderate to severe regurgitation.    Tricuspid Valve: There is mild to moderate regurgitation.    Pulmonic Valve: There is mild regurgitation.    Pulmonary Artery: The estimated pulmonary artery systolic pressure is 51 mmHg.    IVC/SVC: Elevated venous pressure at 15 mmHg.    STRESS TEST  No results found for this or any previous visit.    [facility]  Results for orders placed during the hospital encounter of 03/17/24    Cardiac catheterization    Conclusion  The procedure log was documented by No documenter listed and verified by Adriano Montiel MD.    Procedure:  Selective coronary angiography  Moderate (Conscious) Sedation      Indication: VT arrest, known  cardiomyopathy, NSTEMI  Consent: The patient was brought to the cardiac catheterization lab. Was instructed and explained about the risk, benefit and alternatives of the procedure included but not limited to sudden cardiac death, myocardial infarction, bleeding, vascular injury, renal failure, stroke, contrast allergy, risk of conscious sedation and need for emergent bypass surgery.  the patient was agreeable to proceed.  Signed the consent form.  Access:  The patient was prepped using the usual sterile fashion.  Right radial artery  was accessed with micropuncture technique, ultrasound guidance.  An image of the ultrasound was put in the paper chart for purpose of documentation.  Sheath size:6F    Kirill test:  Verified with pulse oximetry deemed to be adequate for radial artery procedure.    Diagnostic catheters : TIG, JL 3.5    Coronary findings:    Dominance: right  Left main:  10-20% calcified distal left main disease  Left anterior descending artery:  50% calcified proximal stenosis  Circumflex artery:  Luminal irregularities  Right coronary artery:  Luminal irregularities    Access Closure:  At the end of the procedure the sheath was removed. A TR band applied to the right radial artery . Excellent hemostasis achieved.      LABS  Last BMP  Lab Results   Component Value Date     08/26/2024    K 3.8 08/26/2024     08/26/2024    CO2 24 08/26/2024    BUN 35.9 (H) 08/26/2024    CREATININE 1.40 (H) 08/26/2024    CALCIUM 9.7 08/26/2024    EGFRNORACEVR 55 08/26/2024       Lab Results   Component Value Date    CREATININE 1.40 (H) 08/26/2024    CREATININE 0.94 08/18/2024    CREATININE 1.01 08/17/2024     Lab Results   Component Value Date    BNP 2,531.8 (H) 08/13/2024    BNP 1,219.7 (H) 07/16/2024    .0 (H) 06/14/2024     Lab Results   Component Value Date    TROPONINI 0.013 08/26/2024    TROPONINI 0.030 08/13/2024    TROPONINI 0.025 08/13/2024     Last CBC     Lab Results   Component Value Date     "WBC 9.86 08/26/2024    HGB 12.4 (L) 08/26/2024    HCT 38.5 (L) 08/26/2024    MCV 82.3 08/26/2024     08/26/2024           Last lipids    Lab Results   Component Value Date    CHOL 96 08/14/2024    CHOL 46 03/18/2024    CHOL 104 11/16/2021    HDL 32 (L) 08/14/2024    HDL <5 (L) 03/18/2024    HDL 41 11/16/2021    LDL 49.00 (L) 08/14/2024    LDL 49.00 (L) 11/16/2021    LDL 37 10/07/2020    TRIG 73 08/14/2024    TRIG 46 04/12/2024    TRIG 58 04/11/2024    TOTALCHOLEST 3 08/14/2024    TOTALCHOLEST  03/18/2024      Comment:      N/A      TOTALCHOLEST 3 11/16/2021       LFT   No components found for: "LFT"    ASSESSMENT  1. Arteriosclerosis of coronary artery    2. PVD (peripheral vascular disease)    3. VHD (valvular heart disease)    4. Dyslipidemia    5. HFrEF (heart failure with reduced ejection fraction)    6. Chronic atrial fibrillation    10 Year Cardiovascular Risk:  The ASCVD Risk score (Yasmin DK, et al., 2019) failed to calculate for the following reasons:    The patient has a prior MI or stroke diagnosis  BMI:  Body mass index is 28.02 kg/m².  Patient is overweight (BMI 25-29.0)  Last PCP visit:  8/8/2024      PLAN  Medications:  Continue the current cardiac medications  Work up:  obtain BMP in one week to check K+    Follow up:   4 months     "

## 2024-09-23 ENCOUNTER — LAB VISIT (OUTPATIENT)
Dept: LAB | Facility: HOSPITAL | Age: 69
End: 2024-09-23
Attending: INTERNAL MEDICINE
Payer: MEDICARE

## 2024-09-23 DIAGNOSIS — I50.20 HFREF (HEART FAILURE WITH REDUCED EJECTION FRACTION): ICD-10-CM

## 2024-09-23 DIAGNOSIS — I50.20 HFREF (HEART FAILURE WITH REDUCED EJECTION FRACTION): Primary | ICD-10-CM

## 2024-09-23 DIAGNOSIS — F17.210 HEAVY CIGARETTE SMOKER: ICD-10-CM

## 2024-09-23 DIAGNOSIS — I25.10 ARTERIOSCLEROSIS OF CORONARY ARTERY: ICD-10-CM

## 2024-09-23 DIAGNOSIS — I50.9 CHRONIC CONGESTIVE HEART FAILURE, UNSPECIFIED HEART FAILURE TYPE: ICD-10-CM

## 2024-09-23 LAB
ANION GAP SERPL CALC-SCNC: 7 MEQ/L
BUN SERPL-MCNC: 18.4 MG/DL (ref 8.4–25.7)
CALCIUM SERPL-MCNC: 9.7 MG/DL (ref 8.8–10)
CHLORIDE SERPL-SCNC: 112 MMOL/L (ref 98–107)
CO2 SERPL-SCNC: 22 MMOL/L (ref 23–31)
CREAT SERPL-MCNC: 1.32 MG/DL (ref 0.73–1.18)
CREAT/UREA NIT SERPL: 14
GFR SERPLBLD CREATININE-BSD FMLA CKD-EPI: 59 ML/MIN/1.73/M2
GLUCOSE SERPL-MCNC: 87 MG/DL (ref 82–115)
POTASSIUM SERPL-SCNC: 3.9 MMOL/L (ref 3.5–5.1)
SODIUM SERPL-SCNC: 141 MMOL/L (ref 136–145)

## 2024-09-23 PROCEDURE — 80048 BASIC METABOLIC PNL TOTAL CA: CPT

## 2024-09-23 PROCEDURE — 36415 COLL VENOUS BLD VENIPUNCTURE: CPT

## 2024-09-23 RX ORDER — POTASSIUM CHLORIDE 20 MEQ/1
20 TABLET, EXTENDED RELEASE ORAL 2 TIMES DAILY
Qty: 60 TABLET | Refills: 1 | Status: SHIPPED | OUTPATIENT
Start: 2024-09-23

## 2024-09-23 RX ORDER — PANTOPRAZOLE SODIUM 40 MG/1
40 TABLET, DELAYED RELEASE ORAL 2 TIMES DAILY
Qty: 60 TABLET | Refills: 4 | Status: SHIPPED | OUTPATIENT
Start: 2024-09-23

## 2024-09-23 RX ORDER — SACUBITRIL AND VALSARTAN 49; 51 MG/1; MG/1
1 TABLET, FILM COATED ORAL 2 TIMES DAILY
Qty: 60 TABLET | Refills: 1 | Status: SHIPPED | OUTPATIENT
Start: 2024-09-23

## 2024-09-23 RX ORDER — FUROSEMIDE 40 MG/1
40 TABLET ORAL 2 TIMES DAILY
Qty: 60 TABLET | Refills: 1 | Status: SHIPPED | OUTPATIENT
Start: 2024-09-23

## 2024-09-23 NOTE — TELEPHONE ENCOUNTER
----- Message from Kamila Diaz sent at 9/23/2024  2:42 PM CDT -----  Regarding: Dr Recinos  Caller is:  (Ran) Patient       Provider:Dr Recinos    Last Visit:8/8/2024    Next Visit:1/21/2025    Reason for Call:  Patient need refill on  Pantoprazole             Preferred Phone Number: [phone]

## 2024-10-16 NOTE — PROGRESS NOTES
Patient's vitals were taken, found to be hypotensive 78/50 with a pulse of 59, and was immediately taken to the ED for further evaluation.    Anibal Cummins DO  [facility], PGY-III

## 2024-12-02 NOTE — ANESTHESIA POSTPROCEDURE EVALUATION
Anesthesia Post Evaluation    Patient: Ran Reyes Jr.    Procedure(s) Performed: Procedure(s) (LRB):  EGD (N/A)  EGD, WITH CLOSED BIOPSY    Final Anesthesia Type: MAC      Patient location during evaluation: PACU  Patient participation: Yes- Able to Participate  Level of consciousness: awake and alert  Post-procedure vital signs: reviewed and stable  Pain management: adequate  Airway patency: patent  SRINIVASAN mitigation strategies: Extubation and recovery carried out in lateral, semiupright, or other nonsupine position  PONV status at discharge: No PONV  Anesthetic complications: no      Cardiovascular status: blood pressure returned to baseline  Respiratory status: room air and unassisted  Hydration status: euvolemic  Follow-up not needed.  Comments: PT. Appears to have adequately recovered for Anesthetic. VSS, airway patient. No apparent complications noted.              Vitals Value Taken Time   /74 03/22/24 1423   Temp  03/22/24 1436   Pulse 54 03/22/24 1423   Resp 22 03/22/24 1423   SpO2 99 % 03/22/24 1423         No case tracking events are documented in the log.      Pain/Riley Score: Pain Rating Prior to Med Admin: 10 (3/22/2024  6:10 AM)  Pain Rating Post Med Admin: 0 (3/22/2024  6:40 AM)  Riley Score: 9 (3/22/2024  2:31 PM)           No indicators present

## 2024-12-05 ENCOUNTER — HOSPITAL ENCOUNTER (INPATIENT)
Facility: HOSPITAL | Age: 69
LOS: 4 days | Discharge: HOME HEALTH | DRG: 291 | End: 2024-12-09
Attending: EMERGENCY MEDICINE | Admitting: INTERNAL MEDICINE
Payer: MEDICARE

## 2024-12-05 DIAGNOSIS — R06.02 SOB (SHORTNESS OF BREATH): ICD-10-CM

## 2024-12-05 DIAGNOSIS — I50.9 ACUTE ON CHRONIC CONGESTIVE HEART FAILURE, UNSPECIFIED HEART FAILURE TYPE: ICD-10-CM

## 2024-12-05 DIAGNOSIS — Z13.9 SCREENING DUE: ICD-10-CM

## 2024-12-05 DIAGNOSIS — Z87.09 HISTORY OF COPD: ICD-10-CM

## 2024-12-05 DIAGNOSIS — I50.9 CHF (CONGESTIVE HEART FAILURE): ICD-10-CM

## 2024-12-05 DIAGNOSIS — R06.2 WHEEZING: Primary | ICD-10-CM

## 2024-12-05 DIAGNOSIS — Z72.0 TOBACCO USE: ICD-10-CM

## 2024-12-05 DIAGNOSIS — I73.9 PAD (PERIPHERAL ARTERY DISEASE): ICD-10-CM

## 2024-12-05 DIAGNOSIS — R06.00 DYSPNEA: ICD-10-CM

## 2024-12-05 PROBLEM — N30.01 ACUTE CYSTITIS WITH HEMATURIA: Status: ACTIVE | Noted: 2024-12-05

## 2024-12-05 PROBLEM — J44.1 COPD EXACERBATION: Status: ACTIVE | Noted: 2024-12-05

## 2024-12-05 LAB
ALBUMIN SERPL-MCNC: 3.9 G/DL (ref 3.4–4.8)
ALBUMIN SERPL-MCNC: 4 G/DL (ref 3.4–4.8)
ALBUMIN/GLOB SERPL: 1.1 RATIO (ref 1.1–2)
ALBUMIN/GLOB SERPL: 1.1 RATIO (ref 1.1–2)
ALP SERPL-CCNC: 114 UNIT/L (ref 40–150)
ALP SERPL-CCNC: 125 UNIT/L (ref 40–150)
ALT SERPL-CCNC: 11 UNIT/L (ref 0–55)
ALT SERPL-CCNC: 12 UNIT/L (ref 0–55)
ANION GAP SERPL CALC-SCNC: 11 MEQ/L
ANION GAP SERPL CALC-SCNC: 12 MEQ/L
APICAL FOUR CHAMBER EJECTION FRACTION: 29 %
APICAL TWO CHAMBER EJECTION FRACTION: 20 %
AST SERPL-CCNC: 21 UNIT/L (ref 5–34)
AST SERPL-CCNC: 21 UNIT/L (ref 5–34)
AV INDEX (PROSTH): 0.61
AV MEAN GRADIENT: 6.3 MMHG
AV PEAK GRADIENT: 13 MMHG
AV VALVE AREA BY VELOCITY RATIO: 2.8 CM²
AV VALVE AREA: 2.5 CM²
AV VELOCITY RATIO: 0.67
BACTERIA #/AREA URNS AUTO: ABNORMAL /HPF
BASOPHILS # BLD AUTO: 0.02 X10(3)/MCL
BASOPHILS NFR BLD AUTO: 0.4 %
BILIRUB SERPL-MCNC: 1.8 MG/DL
BILIRUB SERPL-MCNC: 2 MG/DL
BILIRUB UR QL STRIP.AUTO: NEGATIVE
BNP BLD-MCNC: 1908.3 PG/ML
BSA FOR ECHO PROCEDURE: 2.25 M2
BUN SERPL-MCNC: 24.9 MG/DL (ref 8.4–25.7)
BUN SERPL-MCNC: 25.7 MG/DL (ref 8.4–25.7)
CALCIUM SERPL-MCNC: 9.5 MG/DL (ref 8.8–10)
CALCIUM SERPL-MCNC: 9.5 MG/DL (ref 8.8–10)
CHLORIDE SERPL-SCNC: 105 MMOL/L (ref 98–107)
CHLORIDE SERPL-SCNC: 106 MMOL/L (ref 98–107)
CK SERPL-CCNC: 56 U/L (ref 30–200)
CLARITY UR: ABNORMAL
CO2 SERPL-SCNC: 23 MMOL/L (ref 23–31)
CO2 SERPL-SCNC: 24 MMOL/L (ref 23–31)
COLOR UR AUTO: ABNORMAL
CREAT SERPL-MCNC: 1.36 MG/DL (ref 0.72–1.25)
CREAT SERPL-MCNC: 1.45 MG/DL (ref 0.72–1.25)
CREAT/UREA NIT SERPL: 18
CREAT/UREA NIT SERPL: 18
CV ECHO LV RWT: 0.37 CM
DOP CALC AO PEAK VEL: 1.8 M/S
DOP CALC AO VTI: 32.5 CM
DOP CALC LVOT AREA: 4.2 CM2
DOP CALC LVOT DIAMETER: 2.3 CM
DOP CALC LVOT PEAK VEL: 1.2 M/S
DOP CALC LVOT STROKE VOLUME: 81.8 CM3
DOP CALC MV VTI: 36.5 CM
DOP CALCLVOT PEAK VEL VTI: 19.7 CM
E WAVE DECELERATION TIME: 253.79 MSEC
E/A RATIO: 39.33
E/E' RATIO: 11.8 M/S
ECHO LV POSTERIOR WALL: 1.1 CM (ref 0.6–1.1)
EOSINOPHIL # BLD AUTO: 0.06 X10(3)/MCL (ref 0–0.9)
EOSINOPHIL NFR BLD AUTO: 1.1 %
ERYTHROCYTE [DISTWIDTH] IN BLOOD BY AUTOMATED COUNT: 21.9 % (ref 11.5–17)
FERRITIN SERPL-MCNC: 31.73 NG/ML (ref 21.81–274.66)
FLUAV AG UPPER RESP QL IA.RAPID: NOT DETECTED
FLUBV AG UPPER RESP QL IA.RAPID: NOT DETECTED
FRACTIONAL SHORTENING: 15 % (ref 28–44)
GFR SERPLBLD CREATININE-BSD FMLA CKD-EPI: 53 ML/MIN/1.73/M2
GFR SERPLBLD CREATININE-BSD FMLA CKD-EPI: 57 ML/MIN/1.73/M2
GLOBULIN SER-MCNC: 3.4 GM/DL (ref 2.4–3.5)
GLOBULIN SER-MCNC: 3.5 GM/DL (ref 2.4–3.5)
GLUCOSE SERPL-MCNC: 89 MG/DL (ref 82–115)
GLUCOSE SERPL-MCNC: 98 MG/DL (ref 82–115)
GLUCOSE UR QL STRIP: NORMAL
HCT VFR BLD AUTO: 30.2 % (ref 42–52)
HGB BLD-MCNC: 9.4 G/DL (ref 14–18)
HGB UR QL STRIP: ABNORMAL
HOLD SPECIMEN: NORMAL
HYALINE CASTS #/AREA URNS LPF: ABNORMAL /LPF
IMM GRANULOCYTES # BLD AUTO: 0.02 X10(3)/MCL (ref 0–0.04)
IMM GRANULOCYTES NFR BLD AUTO: 0.4 %
INTERVENTRICULAR SEPTUM: 1 CM (ref 0.6–1.1)
IRON SATN MFR SERPL: 11 % (ref 20–50)
IRON SERPL-MCNC: 47 UG/DL (ref 65–175)
KETONES UR QL STRIP: NEGATIVE
LEFT ATRIUM SIZE: 6.52 CM
LEFT INTERNAL DIMENSION IN SYSTOLE: 5.1 CM (ref 2.1–4)
LEFT VENTRICLE DIASTOLIC VOLUME INDEX: 81.51 ML/M2
LEFT VENTRICLE DIASTOLIC VOLUME: 180.96 ML
LEFT VENTRICLE END DIASTOLIC VOLUME APICAL 2 CHAMBER: 155.02 ML
LEFT VENTRICLE END DIASTOLIC VOLUME APICAL 4 CHAMBER: 162.43 ML
LEFT VENTRICLE MASS INDEX: 118.5 G/M2
LEFT VENTRICLE SYSTOLIC VOLUME INDEX: 55.5 ML/M2
LEFT VENTRICLE SYSTOLIC VOLUME: 123.18 ML
LEFT VENTRICULAR INTERNAL DIMENSION IN DIASTOLE: 6 CM (ref 3.5–6)
LEFT VENTRICULAR MASS: 263 G
LEUKOCYTE ESTERASE UR QL STRIP: 500
LV LATERAL E/E' RATIO: 10.73 M/S
LV SEPTAL E/E' RATIO: 13.11 M/S
LVED V (TEICH): 180.96 ML
LVES V (TEICH): 123.18 ML
LVOT MG: 2.58 MMHG
LVOT MV: 0.7 CM/S
LYMPHOCYTES # BLD AUTO: 1.13 X10(3)/MCL (ref 0.6–4.6)
LYMPHOCYTES NFR BLD AUTO: 20.7 %
MAGNESIUM SERPL-MCNC: 2.1 MG/DL (ref 1.6–2.6)
MCH RBC QN AUTO: 27.5 PG (ref 27–31)
MCHC RBC AUTO-ENTMCNC: 31.1 G/DL (ref 33–36)
MCV RBC AUTO: 88.3 FL (ref 80–94)
MONOCYTES # BLD AUTO: 0.57 X10(3)/MCL (ref 0.1–1.3)
MONOCYTES NFR BLD AUTO: 10.5 %
MUCOUS THREADS URNS QL MICRO: ABNORMAL /LPF
MV MEAN GRADIENT: 4 MMHG
MV PEAK A VEL: 0.03 M/S
MV PEAK E VEL: 1.18 M/S
MV PEAK GRADIENT: 9 MMHG
MV STENOSIS PRESSURE HALF TIME: 73.6 MS
MV VALVE AREA BY CONTINUITY EQUATION: 2.24 CM2
MV VALVE AREA P 1/2 METHOD: 2.99 CM2
NEUTROPHILS # BLD AUTO: 3.65 X10(3)/MCL (ref 2.1–9.2)
NEUTROPHILS NFR BLD AUTO: 66.9 %
NITRITE UR QL STRIP: NEGATIVE
NRBC BLD AUTO-RTO: 0 %
OHS LV EJECTION FRACTION SIMPSONS BIPLANE MOD: 30 %
PH UR STRIP: 5.5 [PH]
PHOSPHATE SERPL-MCNC: 4.1 MG/DL (ref 2.3–4.7)
PISA TR MAX VEL: 3.02 M/S
PLATELET # BLD AUTO: 168 X10(3)/MCL (ref 130–400)
PMV BLD AUTO: 11.3 FL (ref 7.4–10.4)
POTASSIUM SERPL-SCNC: 3.8 MMOL/L (ref 3.5–5.1)
POTASSIUM SERPL-SCNC: 3.8 MMOL/L (ref 3.5–5.1)
PROT SERPL-MCNC: 7.3 GM/DL (ref 5.8–7.6)
PROT SERPL-MCNC: 7.5 GM/DL (ref 5.8–7.6)
PROT UR QL STRIP: ABNORMAL
RA PRESSURE ESTIMATED: 8 MMHG
RBC # BLD AUTO: 3.42 X10(6)/MCL (ref 4.7–6.1)
RBC #/AREA URNS AUTO: ABNORMAL /HPF
RIGHT VENTRICULAR END-DIASTOLIC DIMENSION: 3.53 CM
RSV A 5' UTR RNA NPH QL NAA+PROBE: NOT DETECTED
RV TB RVSP: 11 MMHG
SARS-COV-2 RNA RESP QL NAA+PROBE: NOT DETECTED
SODIUM SERPL-SCNC: 140 MMOL/L (ref 136–145)
SODIUM SERPL-SCNC: 141 MMOL/L (ref 136–145)
SP GR UR STRIP.AUTO: 1.01 (ref 1–1.03)
SQUAMOUS #/AREA URNS LPF: ABNORMAL /HPF
TDI LATERAL: 0.11 M/S
TDI SEPTAL: 0.09 M/S
TDI: 0.1 M/S
TIBC SERPL-MCNC: 363 UG/DL (ref 60–240)
TIBC SERPL-MCNC: 410 UG/DL (ref 250–450)
TR MAX PG: 36 MMHG
TRANSFERRIN SERPL-MCNC: 374 MG/DL (ref 163–344)
TRICUSPID ANNULAR PLANE SYSTOLIC EXCURSION: 1.64 CM
TROPONIN I SERPL-MCNC: 0.01 NG/ML (ref 0–0.04)
TV REST PULMONARY ARTERY PRESSURE: 44 MMHG
UROBILINOGEN UR STRIP-ACNC: NORMAL
WBC # BLD AUTO: 5.45 X10(3)/MCL (ref 4.5–11.5)
WBC #/AREA URNS AUTO: >100 /HPF
Z-SCORE OF LEFT VENTRICULAR DIMENSION IN END DIASTOLE: -2.56
Z-SCORE OF LEFT VENTRICULAR DIMENSION IN END SYSTOLE: 0.64

## 2024-12-05 PROCEDURE — 85025 COMPLETE CBC W/AUTO DIFF WBC: CPT | Performed by: EMERGENCY MEDICINE

## 2024-12-05 PROCEDURE — 25000003 PHARM REV CODE 250

## 2024-12-05 PROCEDURE — 25000242 PHARM REV CODE 250 ALT 637 W/ HCPCS

## 2024-12-05 PROCEDURE — 63600175 PHARM REV CODE 636 W HCPCS: Performed by: EMERGENCY MEDICINE

## 2024-12-05 PROCEDURE — 94640 AIRWAY INHALATION TREATMENT: CPT

## 2024-12-05 PROCEDURE — 82728 ASSAY OF FERRITIN: CPT

## 2024-12-05 PROCEDURE — 80053 COMPREHEN METABOLIC PANEL: CPT

## 2024-12-05 PROCEDURE — 81001 URINALYSIS AUTO W/SCOPE: CPT | Performed by: EMERGENCY MEDICINE

## 2024-12-05 PROCEDURE — 96374 THER/PROPH/DIAG INJ IV PUSH: CPT

## 2024-12-05 PROCEDURE — 83735 ASSAY OF MAGNESIUM: CPT

## 2024-12-05 PROCEDURE — 25000003 PHARM REV CODE 250: Performed by: EMERGENCY MEDICINE

## 2024-12-05 PROCEDURE — 27000221 HC OXYGEN, UP TO 24 HOURS

## 2024-12-05 PROCEDURE — 99285 EMERGENCY DEPT VISIT HI MDM: CPT | Mod: 25

## 2024-12-05 PROCEDURE — 80053 COMPREHEN METABOLIC PANEL: CPT | Performed by: EMERGENCY MEDICINE

## 2024-12-05 PROCEDURE — 97162 PT EVAL MOD COMPLEX 30 MIN: CPT

## 2024-12-05 PROCEDURE — 84484 ASSAY OF TROPONIN QUANT: CPT | Performed by: EMERGENCY MEDICINE

## 2024-12-05 PROCEDURE — 83880 ASSAY OF NATRIURETIC PEPTIDE: CPT | Performed by: EMERGENCY MEDICINE

## 2024-12-05 PROCEDURE — 93005 ELECTROCARDIOGRAM TRACING: CPT

## 2024-12-05 PROCEDURE — 87077 CULTURE AEROBIC IDENTIFY: CPT | Performed by: EMERGENCY MEDICINE

## 2024-12-05 PROCEDURE — 21400001 HC TELEMETRY ROOM

## 2024-12-05 PROCEDURE — S4991 NICOTINE PATCH NONLEGEND: HCPCS | Performed by: EMERGENCY MEDICINE

## 2024-12-05 PROCEDURE — 83550 IRON BINDING TEST: CPT

## 2024-12-05 PROCEDURE — 0241U COVID/RSV/FLU A&B PCR: CPT

## 2024-12-05 PROCEDURE — 63600175 PHARM REV CODE 636 W HCPCS

## 2024-12-05 PROCEDURE — 82550 ASSAY OF CK (CPK): CPT | Performed by: EMERGENCY MEDICINE

## 2024-12-05 PROCEDURE — 84100 ASSAY OF PHOSPHORUS: CPT

## 2024-12-05 RX ORDER — PANTOPRAZOLE SODIUM 40 MG/1
40 TABLET, DELAYED RELEASE ORAL 2 TIMES DAILY
Status: DISCONTINUED | OUTPATIENT
Start: 2024-12-05 | End: 2024-12-09 | Stop reason: HOSPADM

## 2024-12-05 RX ORDER — SACUBITRIL AND VALSARTAN 24; 26 MG/1; MG/1
2 TABLET, FILM COATED ORAL 2 TIMES DAILY
Status: DISCONTINUED | OUTPATIENT
Start: 2024-12-05 | End: 2024-12-05

## 2024-12-05 RX ORDER — FOLIC ACID 1 MG/1
1 TABLET ORAL DAILY
Status: DISCONTINUED | OUTPATIENT
Start: 2024-12-05 | End: 2024-12-09 | Stop reason: HOSPADM

## 2024-12-05 RX ORDER — IPRATROPIUM BROMIDE AND ALBUTEROL SULFATE 2.5; .5 MG/3ML; MG/3ML
3 SOLUTION RESPIRATORY (INHALATION) EVERY 4 HOURS
Status: DISCONTINUED | OUTPATIENT
Start: 2024-12-05 | End: 2024-12-06

## 2024-12-05 RX ORDER — POTASSIUM CHLORIDE 20 MEQ/1
20 TABLET, EXTENDED RELEASE ORAL 2 TIMES DAILY
Status: DISCONTINUED | OUTPATIENT
Start: 2024-12-05 | End: 2024-12-06

## 2024-12-05 RX ORDER — ASPIRIN 81 MG/1
81 TABLET ORAL DAILY
Status: DISCONTINUED | OUTPATIENT
Start: 2024-12-06 | End: 2024-12-06

## 2024-12-05 RX ORDER — SACUBITRIL AND VALSARTAN 24; 26 MG/1; MG/1
1 TABLET, FILM COATED ORAL 2 TIMES DAILY
Status: DISCONTINUED | OUTPATIENT
Start: 2024-12-05 | End: 2024-12-09 | Stop reason: HOSPADM

## 2024-12-05 RX ORDER — CETIRIZINE HYDROCHLORIDE 10 MG/1
10 TABLET ORAL DAILY
Status: DISCONTINUED | OUTPATIENT
Start: 2024-12-05 | End: 2024-12-09 | Stop reason: HOSPADM

## 2024-12-05 RX ORDER — SPIRONOLACTONE 25 MG/1
50 TABLET ORAL DAILY
Status: DISCONTINUED | OUTPATIENT
Start: 2024-12-05 | End: 2024-12-05

## 2024-12-05 RX ORDER — FUROSEMIDE 10 MG/ML
40 INJECTION INTRAMUSCULAR; INTRAVENOUS ONCE
Status: COMPLETED | OUTPATIENT
Start: 2024-12-06 | End: 2024-12-06

## 2024-12-05 RX ORDER — ASPIRIN 325 MG
325 TABLET ORAL
Status: COMPLETED | OUTPATIENT
Start: 2024-12-05 | End: 2024-12-05

## 2024-12-05 RX ORDER — CLOPIDOGREL BISULFATE 75 MG/1
75 TABLET ORAL DAILY
Status: DISCONTINUED | OUTPATIENT
Start: 2024-12-05 | End: 2024-12-09 | Stop reason: HOSPADM

## 2024-12-05 RX ORDER — FUROSEMIDE 10 MG/ML
40 INJECTION INTRAMUSCULAR; INTRAVENOUS
Status: COMPLETED | OUTPATIENT
Start: 2024-12-05 | End: 2024-12-05

## 2024-12-05 RX ORDER — IBUPROFEN 200 MG
1 TABLET ORAL DAILY
Status: DISCONTINUED | OUTPATIENT
Start: 2024-12-05 | End: 2024-12-09 | Stop reason: HOSPADM

## 2024-12-05 RX ORDER — MIDODRINE HYDROCHLORIDE 5 MG/1
10 TABLET ORAL
Status: DISCONTINUED | OUTPATIENT
Start: 2024-12-05 | End: 2024-12-06

## 2024-12-05 RX ORDER — ATORVASTATIN CALCIUM 40 MG/1
80 TABLET, FILM COATED ORAL DAILY
Status: DISCONTINUED | OUTPATIENT
Start: 2024-12-05 | End: 2024-12-09 | Stop reason: HOSPADM

## 2024-12-05 RX ORDER — NITROFURANTOIN 25; 75 MG/1; MG/1
100 CAPSULE ORAL EVERY 12 HOURS
Status: DISCONTINUED | OUTPATIENT
Start: 2024-12-05 | End: 2024-12-08

## 2024-12-05 RX ORDER — ACETAMINOPHEN 325 MG/1
650 TABLET ORAL EVERY 4 HOURS PRN
Status: DISCONTINUED | OUTPATIENT
Start: 2024-12-05 | End: 2024-12-09 | Stop reason: HOSPADM

## 2024-12-05 RX ORDER — SODIUM CHLORIDE 0.9 % (FLUSH) 0.9 %
10 SYRINGE (ML) INJECTION
Status: DISCONTINUED | OUTPATIENT
Start: 2024-12-05 | End: 2024-12-09 | Stop reason: HOSPADM

## 2024-12-05 RX ADMIN — IPRATROPIUM BROMIDE AND ALBUTEROL SULFATE 3 ML: .5; 3 SOLUTION RESPIRATORY (INHALATION) at 07:12

## 2024-12-05 RX ADMIN — METOPROLOL SUCCINATE 12.5 MG: 25 TABLET, EXTENDED RELEASE ORAL at 01:12

## 2024-12-05 RX ADMIN — IPRATROPIUM BROMIDE AND ALBUTEROL SULFATE 3 ML: .5; 3 SOLUTION RESPIRATORY (INHALATION) at 11:12

## 2024-12-05 RX ADMIN — NICOTINE 1 PATCH: 14 PATCH, EXTENDED RELEASE TRANSDERMAL at 01:12

## 2024-12-05 RX ADMIN — IPRATROPIUM BROMIDE AND ALBUTEROL SULFATE 3 ML: .5; 3 SOLUTION RESPIRATORY (INHALATION) at 03:12

## 2024-12-05 RX ADMIN — NITROFURANTOIN MONOHYDRATE/MACROCRYSTALS 100 MG: 75; 25 CAPSULE ORAL at 08:12

## 2024-12-05 RX ADMIN — MIDODRINE HYDROCHLORIDE 10 MG: 5 TABLET ORAL at 05:12

## 2024-12-05 RX ADMIN — RIVAROXABAN 20 MG: 10 TABLET, FILM COATED ORAL at 05:12

## 2024-12-05 RX ADMIN — IPRATROPIUM BROMIDE AND ALBUTEROL SULFATE 3 ML: .5; 3 SOLUTION RESPIRATORY (INHALATION) at 12:12

## 2024-12-05 RX ADMIN — METHYLPREDNISOLONE SODIUM SUCCINATE 60 MG: 40 INJECTION, POWDER, FOR SOLUTION INTRAMUSCULAR; INTRAVENOUS at 12:12

## 2024-12-05 RX ADMIN — POTASSIUM CHLORIDE 20 MEQ: 1500 TABLET, EXTENDED RELEASE ORAL at 08:12

## 2024-12-05 RX ADMIN — CETIRIZINE HYDROCHLORIDE 10 MG: 10 TABLET, FILM COATED ORAL at 12:12

## 2024-12-05 RX ADMIN — PANTOPRAZOLE SODIUM 40 MG: 40 TABLET, DELAYED RELEASE ORAL at 08:12

## 2024-12-05 RX ADMIN — FUROSEMIDE 40 MG: 10 INJECTION, SOLUTION INTRAMUSCULAR; INTRAVENOUS at 12:12

## 2024-12-05 RX ADMIN — SACUBITRIL AND VALSARTAN 1 TABLET: 24; 26 TABLET, FILM COATED ORAL at 08:12

## 2024-12-05 RX ADMIN — ASPIRIN 325 MG ORAL TABLET 325 MG: 325 PILL ORAL at 09:12

## 2024-12-05 NOTE — H&P
[facility] Wards History and Physical Note      Resident Team: [facility] Medicine List 2  Attending Physician: Terry Boyd MD  Resident: Anushka Clarke DO  Intern: Diana Hassan MD     Date of Admit: 12/5/2024  Chief Complaint   Shortness of breath     HPI   Ran Reyes Jr. is a 69 y.o. male with PMH significant for atrial fibrillation on Xarelto, CHF, COPD on 2L home oxygen, CAD, HTN, mitral regurgitation, and PAD, who presented to [facility] ED on 12/5/2024  with a primary complaint of shortness of breath. Reports increasing shortness of breath with orthopnea and dyspnea on exertion for the past two days. He noticed an increase in his lower extremity swelling bilaterally. He also complains of tightness of his abdomen but denies abdominal pain or tenderness. He states that he ran out of Entresto recently and thus has not been taking it; reports adherence to all other medications. For his COPD, he denies being on any maintenance or rescue inhalers. Denies recent illness or sick contact. Denies change in diet and denies consuming an increase in salty foods. Denies chest pain, nausea, vomiting, constipation, diarrhea, or difficulty with urination. Patient was having increasing difficulty with breathing so he called 911. He was given a Duoneb treatment en route to the ED.    Patient also reports feeling weak and dizzy after getting out of his vehicle yesterday at [employer]. States that he normally has to wait a few seconds after standing to be sure that he is steady on his feet. He got out of his car quickly and felt dizzy while walking into the store. States that bystanders helped him as he fell but he hit the back of his head on a post. Denies loss of consciousness. Denies headache and was able to walk unassisted after the fall.     ED Course - Upon arrival, patient afebrile T 96.8 F, /96, HR 97, RR 22, SpO2 98% on 2L NC. Workup significant for normocytic anemia H/H 9.4/30.2 (baseline around 12/38),  creatinine 1.45 (baseline 1.3), total bilirubin 1.8, BNP 1908 (2531.8 3 months ago). UA with trace protein, 1+ blood, 500 leukocyte esterase, 11-20 RBC, >100 WBC, trace bacteria, 3-5 hyaline casts. Troponin and CK WNL. CXR performed showing prominent interstitial markings. EKG performed showing atrial fibrillation with PVCs, rate 77 bpm. He was given aspirin 325 mg and Lasix 40 mg IV in the ED. Internal medicine was consulted for further management.     History    PMH:  Past Medical History:   Diagnosis Date    A-fib     Anticoagulant long-term use     Aortic aneurysm     Cataract     CHF (congestive heart failure)     Chronic atrial fibrillation     COPD (chronic obstructive pulmonary disease)     Coronary artery disease     HLD (hyperlipidemia)     Hypertension     Mitral regurgitation     PAD (peripheral artery disease)     Primary open angle glaucoma (POAG)      Past Surgical History:   Past Surgical History:   Procedure Laterality Date    ANGIOGRAM, CORONARY, WITH LEFT HEART CATHETERIZATION N/A 3/26/2024    Procedure: Angiogram, Coronary, with Left Heart Cath;  Surgeon: Adriano Montiel MD;  Location: [facility] CATH LAB;  Service: Cardiology;  Laterality: N/A;    ATHERECTOMY OF PERIPHERAL VESSEL Left 09/12/2022    LEFT SFA ATHERECTOMY, BALLOON ANGIOPLASTY    CATARACT EXTRACTION W/  INTRAOCULAR LENS IMPLANT Left 3/9/2023    Procedure: EXTRACTION, CATARACT, WITH IOL INSERTION;  Surgeon: Georgi Borja MD;  Location: [facility];  Service: Ophthalmology;  Laterality: Left;  19.5  mac    EGD, WITH CLOSED BIOPSY  3/22/2024    Procedure: EGD, WITH CLOSED BIOPSY;  Surgeon: Ronald Calderon MD;  Location: [facility] ENDOSCOPY;  Service: Gastroenterology;;    ESOPHAGOGASTRODUODENOSCOPY N/A 3/22/2024    Procedure: EGD;  Surgeon: Ronald Calderon MD;  Location: [facility] ENDOSCOPY;  Service: Gastroenterology;  Laterality: N/A;    Heart Stent N/A     > 10yrs. AGO    INCISION AND DRAINAGE N/A 3/18/2024    Procedure:  Incision and Drainage;  Surgeon: Fahad Rivas MD;  Location: [facility] OR;  Service: Urology;  Laterality: N/A;  I&D SCROTAL ABSCESS    INSERTION OF STENT INTO PERIPHERAL VESSEL Right 10/17/2022    RIGHT SFA ATHERECTOMY, BALLOON ANGIOPLASTY, STENT; RIGHT ANTERIOR TIBIAL ARTERY ATHERECTOMY, BALLOON ANGIOPLASTY    ORCHIECTOMY Left 3/18/2024    Procedure: ORCHIECTOMY;  Surgeon: Fahad Rivas MD;  [facility] OR;  Service: Urology;  Laterality: Left;     Family History:   Family History   Problem Relation Name Age of Onset    Cancer Mother      Hypertension Mother      Heart failure Father      Stroke Father      Hypertension Father      No Known Problems Sister       Social:   Social History     Tobacco Use    Smoking status: Every Day     Current packs/day: 0.25     Average packs/day: 0.3 packs/day for 40.0 years (10.0 ttl pk-yrs)     Types: Cigarettes     Passive exposure: Current    Smokeless tobacco: Never    Tobacco comments:     Hospitals in [place] smoke 2-3 cigarettes per day   Substance Use Topics    Alcohol use: Yes     Alcohol/week: 3.0 standard drinks of alcohol     Types: 3 Cans of beer per week     Comment: socially    Drug use: Not Currently     Frequency: 1.0 times per week     Types: Marijuana     Comment: no use in over 1 year     Allergies: Review of patient's allergies indicates:  No Known Allergies    Home Medications   Prior to Admission medications    Medication Sig Start Date End Date Taking? Authorizing Provider   albuterol-ipratropium (DUO-NEB) 2.5 mg-0.5 mg/3 mL nebulizer solution Take 3 mLs by nebulization every 6 (six) hours as needed for Wheezing. Rescue 8/17/24   Sana James MD   aspirin (ECOTRIN) 81 MG EC tablet Take 1 tablet (81 mg total) by mouth once daily. 4/9/24 4/9/25  Tha Edmond MD   atorvastatin (LIPITOR) 80 MG tablet Take 1 tablet (80 mg total) by mouth once daily. 8/8/24 8/8/25  Abiodun Recinos DO   cetirizine (ZYRTEC) 10 MG tablet Take 1 tablet (10 mg total) by mouth once  daily. 8/8/24 8/3/25  Abiodun Recinos DO   clopidogreL (PLAVIX) 75 mg tablet Take 75 mg by mouth once daily.    Provider, Historical   folic acid (FOLVITE) 1 MG tablet Take 1 tablet (1 mg total) by mouth once daily. 4/9/24 4/9/25  Tha Edmond MD   furosemide (LASIX) 40 MG tablet Take 1 tablet (40 mg total) by mouth 2 (two) times a day. 9/23/24   Aung Verduzco MD   hydrOXYzine HCL (ATARAX) 10 MG Tab Take 50 mg by mouth 2 (two) times daily as needed.  Patient not taking: Reported on 8/26/2024    Provider, Historical   metoprolol succinate (TOPROL-XL) 25 MG 24 hr tablet Take 0.5 tablets (12.5 mg total) by mouth once daily.  Patient not taking: Reported on 9/3/2024 8/8/24 8/8/25  Abiodun Recinos DO   midodrine (PROAMATINE) 10 MG tablet Take 1 tablet (10 mg total) by mouth 3 (three) times daily with meals. 4/9/24 4/9/25  Tha Edmond MD   pantoprazole (PROTONIX) 40 MG tablet Take 1 tablet (40 mg total) by mouth 2 (two) times a day. 9/23/24   Abiodun Recinos DO   potassium chloride SA (K-DUR,KLOR-CON) 20 MEQ tablet Take 1 tablet (20 mEq total) by mouth 2 (two) times daily. 9/23/24   Aung Verduzco MD   rivaroxaban (XARELTO) 20 mg Tab Take 1 tablet (20 mg total) by mouth Daily. 7/16/24   Joe Clarke MD   sacubitriL-valsartan (ENTRESTO) 49-51 mg per tablet Take 1 tablet by mouth 2 (two) times daily. 9/23/24   Aung Verduzco MD   spironolactone (ALDACTONE) 50 MG tablet Take 1 tablet (50 mg total) by mouth once daily. 8/17/24   Sana James MD   triamcinolone acetonide 0.1% (KENALOG) 0.1 % cream Apply topically Daily.    Provider, Historical   varenicline (CHANTIX STARTING MONTH BOX) 0.5 mg (11)- 1 mg (42) tablet Take one 0.5mg tab by mouth once daily X3 days,then increase to one 0.5mg tab twice daily X4 days,then increase to one 1mg tab twice daily  Patient not taking: Reported on 8/26/2024 8/8/24   Abiodun Recinos DO       Review of Systems   Review of Systems   Constitutional:  Negative for chills and  fever.   Respiratory:  Positive for shortness of breath and wheezing. Negative for cough and sputum production.    Cardiovascular:  Positive for orthopnea and leg swelling. Negative for chest pain and palpitations.   Gastrointestinal:  Negative for abdominal pain, constipation, diarrhea, nausea and vomiting.   Genitourinary:  Negative for dysuria and frequency.   Neurological:  Negative for dizziness, weakness and headaches.        Vital Signs    BP  Min: 121/79  Max: 136/96  Temp  Av.8 °F (36 °C)  Min: 96.8 °F (36 °C)  Max: 96.8 °F (36 °C)  Pulse  Av  Min: 63  Max: 97  Resp  Av.8  Min: 17  Max: 33  SpO2  Av.2 %  Min: 95 %  Max: 99 %  Height  Av' (182.9 cm)  Min: 6' (182.9 cm)  Max: 6' (182.9 cm)  Weight  Av.8 kg (220 lb)  Min: 99.8 kg (220 lb)  Max: 99.8 kg (220 lb)  Body mass index is 29.84 kg/m².  No intake/output data recorded.    Physical Exam    Physical Exam  Vitals and nursing note reviewed.   Constitutional:       General: He is not in acute distress.     Comments: Uncomfortable appearing   HENT:      Head: Normocephalic and atraumatic.      Mouth/Throat:      Mouth: Mucous membranes are dry.      Pharynx: Oropharynx is clear.   Eyes:      Extraocular Movements: Extraocular movements intact.   Neck:      Vascular: JVD present.   Cardiovascular:      Rate and Rhythm: Normal rate. Rhythm irregular.      Pulses: Normal pulses.      Heart sounds: Normal heart sounds. No murmur heard.  Pulmonary:      Effort: Accessory muscle usage present.      Comments: Increased work of breathing. Audible expiratory wheezing. Diffuse expiratory wheezing in all lung fields on auscultation. Bibasilar rales.   Abdominal:      General: Abdomen is protuberant. Bowel sounds are normal.      Palpations: Abdomen is soft.      Tenderness: There is no abdominal tenderness. There is no guarding or rebound.   Musculoskeletal:      Cervical back: No deformity, tenderness or bony tenderness.      Thoracic back:  No deformity, tenderness or bony tenderness.      Lumbar back: No deformity, tenderness or bony tenderness.      Right lower le+ Pitting Edema present.      Left lower le+ Pitting Edema present.   Skin:     General: Skin is warm and dry.   Neurological:      General: No focal deficit present.      Mental Status: He is alert and oriented to person, place, and time.      Cranial Nerves: Cranial nerves 2-12 are intact. No dysarthria or facial asymmetry.      Motor: No weakness or tremor.      Coordination: Coordination normal. Finger-Nose-Finger Test and Heel to Shin Test normal.      Gait: Gait is intact.         Labs    Most Recent Data:  CBC:   Lab Results   Component Value Date    WBC 5.45 2024    HGB 9.4 (L) 2024    HCT 30.2 (L) 2024     2024    MCV 88.3 2024    RDW 21.9 (H) 2024     WBC Differential:   Recent Labs   Lab 24  09   WBC 5.45   HGB 9.4*   HCT 30.2*      MCV 88.3     BMP:   Lab Results   Component Value Date     2024    K 3.8 2024     2024    CO2 23 2024    BUN 25.7 2024    CREATININE 1.45 (H) 2024    CALCIUM 9.5 2024    MG 2.10 2024    PHOS 2.7 2024     LFTs:   Lab Results   Component Value Date    ALBUMIN 3.9 2024    BILITOT 1.8 (H) 2024    AST 21 2024    ALKPHOS 114 2024    ALT 12 2024     Coags:   Lab Results   Component Value Date    INR 2.7 (H) 2024     FLP:   Lab Results   Component Value Date    CHOL 96 2024    HDL 32 (L) 2024    TRIG 73 2024     DM:   Lab Results   Component Value Date    HGBA1C 5.0 2024    HGBA1C 4.7 11/15/2021    HGBA1C 5.7 10/07/2020    CREATININE 1.45 (H) 2024     Thyroid:   Lab Results   Component Value Date    TSH 1.097 2024      Anemia:   Lab Results   Component Value Date    IRON 27 (L) 2024    TIBC 151 (L) 2024    FERRITIN 1,101.88 (H) 2024       Lab  Results   Component Value Date    EYGRCQFV68 1,078 (H) 03/18/2024       Lab Results   Component Value Date    FOLATE 10.0 03/18/2024        Cardiac:   Lab Results   Component Value Date    TROPONINI 0.011 12/05/2024    BNP 1,908.3 (H) 12/05/2024     Urinalysis:   Lab Results   Component Value Date    LABURIN >/= 100,000 colonies/ml Escherichia coli ESBL (A) 08/26/2024    COLORU Light-Yellow 12/05/2024    SPECGRAV 1.015 05/15/2024    NITRITE Negative 12/05/2024    KETONESU neg 05/15/2024    UROBILINOGEN Normal 12/05/2024    WBCUA >100 (A) 12/05/2024       Trended Lab Data:  Recent Labs   Lab 12/05/24  0923   WBC 5.45   HGB 9.4*   HCT 30.2*      MCV 88.3   RDW 21.9*      K 3.8      CO2 23   BUN 25.7   CREATININE 1.45*   ALBUMIN 3.9   BILITOT 1.8*   AST 21   ALKPHOS 114   ALT 12       Trended Cardiac Data:  Recent Labs   Lab 12/05/24  0923   TROPONINI 0.011   BNP 1,908.3*       Microbiology Data:  Microbiology Results (last 7 days)       Procedure Component Value Units Date/Time    Urine culture [[id number]] Collected: 12/05/24 1002    Order Status: Sent Specimen: Urine Updated: 12/05/24 1040             EKG (my interpretation): atrial fibrillation, rate 77 bpm, PVCs    Imaging      Imaging Results              X-Ray Chest AP Portable (Final result)  Result time 12/05/24 10:01:58      Final result by Marino Frazier MD (12/05/24 10:01:58)                   Impression:      No acute cardiopulmonary process.  Findings of chronic cardiopulmonary disease.      Electronically signed by: Marino Frazier  Date:    12/05/2024  Time:    10:01               Narrative:    EXAMINATION:  XR CHEST AP PORTABLE    CLINICAL HISTORY:  Dyspnea, unspecified    TECHNIQUE:  Single view of the chest    COMPARISON:  No prior imaging available for comparison.    FINDINGS:  Prominent interstitial markings with no focal opacification.    No pleural effusion or pneumothorax.    The cardiomediastinal silhouette remains  enlarged.    No acute osseous abnormality.                                       Assessment and Plan     Acute CHF exacerbation  - TTE 8/14/24 EF 22%, PASP 51 mmHg, elevated venous pressure 15 mmHg  - Repeat TTE ordered  - S/p Lasix 40 mg IV in ED, additional dose scheduled for AM  - On Lasix 40 mg PO BID at home  - Strict I&Os, daily weights  - Fluid restriction, low sodium diet  - Monitor for improvement in fluid overload  - Continue home Entresto and metoprolol succinate 12.5 mg daily  - Holding home spironolactone  - COVID/Flu/RSV pending    Shortness of breath  COPD  - S/p Duoneb x 1 en route to ED without improvement in shortness of breath or wheezing  - Audibly expiratory wheezes and diffuse expiratory wheezes on auscultation  - Scheduled Duonebs q4h  - On home 2L NC, goal saturations 88-92%  - Imitated Solumedrol 60 mg IV q12h    Acute cystitis with hematuria  - UA with trace protein, 1+ blood, 500 leukocyte esterase, 11-20 RBC, >100 WBC, trace bacteria, 3-5 hyaline casts  - Urine culture from August with E. Coli ESBL sensitive to Macrobid  - Initiating Macrobid 100 mg q12h    Recent Fall  - Fall yesterday, hit posterior skull on a pole. Denies LOC and was able to ambulate unassisted after  - Likely a result of orthostatic hypotension  - No neuro deficits appreciated on exam  - No spinal or paraspinal muscle tenderness of cervical, thoracic, or lumbar spine  - Given that patient is currently on Xarelto, CT head without contrast ordered to rule out intracranial bleed    Normocytic anemia  - H/H 9.4/30.2, MCV 88. Previously 12.4/38.5 3 months ago  - On home folate supplementation  - Iron studies pending    Atrial fibrillation, rate controlled  - Continue home Xarelto, ASA, Plavix, metoprolol  - On cardiac monitoring    Hypotension  - Normotensive to mildly hypertensive in ED  - Continue home midodrine 10 mg TIDWM    History of AAA  - Last CT abd/pelvis 4/26/24: 3.8 cm saccular abdominal aneurysm measuring 3.8  cm  - AAA screening US performed 1/2023. Consider follow up outpatient  - If increase in pain or hemodynamic instability, can consider inpatient    GERD  - Continue home Protonix    HLD  - Continue home Lipitor      CODE STATUS: Full  Access: pIV  Antibiotics: None  Diet: Heart Healthy  DVT Prophylaxis: Xarelto  GI Prophylaxis: Protonix  Fluids: None      Disposition: 69 year old male admitted for CHF exacerbation and management of shortness of breath. Scheduled duonebs q4h and diuresing. Discharge pending course.       Diana Hassan MD  [facility]

## 2024-12-05 NOTE — PLAN OF CARE
12/05/24 1341   Discharge Assessment   Assessment Type Discharge Planning Assessment   Confirmed/corrected address, phone number and insurance Yes   Confirmed Demographics Correct on Facesheet   Source of Information patient;health record   When was your last doctors appointment?   (Abiodun Jorje)   Reason For Admission SOB   People in Home alone   Facility Arrived From: Home   Do you expect to return to your current living situation? Yes   Do you have help at home or someone to help you manage your care at home? Yes   Who are your caregiver(s) and their phone number(s)? Yesenia Reyes (Sister)  [phone]; Sister, Nina Gordon ([phone])   Prior to hospitilization cognitive status: Alert/Oriented   Current cognitive status: Alert/Oriented   Walking or Climbing Stairs Difficulty yes   Walking or Climbing Stairs ambulation difficulty, requires equipment   Mobility Management Rollator, Cane   Dressing/Bathing Difficulty no   Home Accessibility wheelchair accessible   Home Layout Able to live on 1st floor   Equipment Currently Used at Home rollator;nebulizer;blood pressure machine;oxygen;cane, straight  ([facility]   Readmission within 30 days? No   Patient currently being followed by outpatient case management? No   Do you currently have service(s) that help you manage your care at home? No   Do you take prescription medications? Yes  ([facility])   Do you have prescription coverage? Yes   Coverage [employer]   Do you have any problems affording any of your prescribed medications? No   Is the patient taking medications as prescribed? yes   Who is going to help you get home at discharge? Cab   How do you get to doctors appointments? family or friend will provide   Are you on dialysis? No   Do you take coumadin? No   Discharge Plan A Home;Home Health   DME Needed Upon Discharge  none   Discharge Plan discussed with: Patient   Transition of Care Barriers None     Pt single with no children;  Resides alone; As discussed with pt, referral submitted to [facility] for HH services; Sister, Yesenia Reyes ([phone]) emergency contact; Alternate is sister in [place], Nina Gordon ([phone]); CM to follow.

## 2024-12-05 NOTE — PT/OT/SLP EVAL
Physical Therapy Evaluation    Patient Name:  Ran Reyes Jr.   MRN:  [id number]    Recommendations:     Therapy Intensity Recommendations at Discharge: Low Intensity Therapy  Discharge Equipment Recommendations: shower chair   Equipment to be obtained for discharge: shower chair.  Barriers to discharge: fall risk and decreased endurance    Assessment:     Ran Reyes Jr. is a 69 y.o. male admitted with a medical diagnosis of CHF exacerbation.  1. Wheezing    2. SOB (shortness of breath)    3. Dyspnea    4. Screening due    5. CHF (congestive heart failure)    6. Acute on chronic congestive heart failure, unspecified heart failure type    7. History of COPD       Patient Active Problem List   Diagnosis    Primary open angle glaucoma (POAG) of right eye, moderate stage    Combined forms of age-related cataract of left eye    Arteriosclerosis of coronary artery    Chronic atrial fibrillation    Dyslipidemia    Hypertension    Tobacco use    PVD (peripheral vascular disease)    VHD (valvular heart disease)    COVID-19    HFrEF (heart failure with reduced ejection fraction)    Nonrheumatic mitral valve regurgitation    Postoperative eye state    Scrotal hematoma    Chronic obstructive pulmonary disease, unspecified    Lesion of external ear    Low back pain    Other thrombophilia    Secondary hyperaldosteronism    Positive colorectal cancer screening using Cologuard test    Acute heart failure    History of COPD    CHF exacerbation    Wheezing    Acute cystitis with hematuria      He presents with the following impairments/functional limitations:  weakness, impaired endurance, impaired self care skills, impaired functional mobility, decreased safety awareness, edema.    Rehab Prognosis: Good.    Patient would benefit from continued skilled acute PT services to: address above listed impairments/functional limitations; receive patient/caregiver education; reduce fall risk; and maximize independency/safety with  functional mobility.    Recent Surgery: * No surgery found *      Plan:     During this hospitalization, patient to be seen 5 x/week to address the identified impairments/functional limitations via gait training, therapeutic activities, therapeutic exercises and progress toward the established goals.    Plan of Care Expires:  01/04/25    Subjective     Communicated with patient's nurse (Morgan) prior to session.    Patient agreeable to participate in evaluation.     Chief Complaint: shortness of breath  Patient/Family Comments/goals: feel better  Pain/Comfort:  Pain Rating 1: 0/10  Pain Rating Post-Intervention 1: 0/10    Patients cultural, spiritual, Rastafarian conflicts given the current situation: no    Social History  Living Environment: Patient lives alone in a first floor apartment, with no steps, with tub-shower combo.  Functional Level: Prior to admission patient was using public transportation or getting rides from his sister, was independent in ADL's, and ambulated with a rollator.  Equipment Used at Home: rollator, 2 L oxygen, cane, straight, blood pressure machine  Equipment owned (not currently used): straight cane.  Assistance Upon Discharge:  sister .    Objective:     Patient found sitting edge of bed with telemetry, oxygen  upon PT entry to room.    General Precautions: Standard, fall   Orthopedic Precautions:N/A   Braces:  N/A  Respiratory Status: 2 liters/min O2 via nasal cannula    Vitals   At Rest (pre-session)  O2 Sat %  97%      With Activity (post-session)  O2 Sat %  100%     Exams:  Orientation: Patient is oriented to person, place, time, situation  Commands: Patient follows commands consistently  Sensation:    -     Intact: light/touch bilat lower extremity  Skin Integrity/Edema:     -       Skin integrity: Visible skin intact  -       Edema: Severe bilateral lower extremity  RUE ROM: WFL  RUE Strength: WFL  LUE ROM: WFL  LUE Strength: WFL  RLE ROM: WFL  RLE Strength: WFL  LLE ROM:  WFL  LLE Strength: WFL    Functional Mobility:    Bed Mobility:  Seated at edge of bed at start of session and left seated edge of bed at end of session    Transfers:  Sit to Stand: stand by assistance with rolling walker  Stand to Sit: stand by assistance with rolling walker    Gait:  Patient ambulated 130ft with rolling walker and contact guard assistance.  Patient demonstrates :       steady gait       no loss of balance       no mis-steps       flexed posture.  2 L O2 in tow    Other Mobility:  N/A    Balance:  Sit  Static: GOOD: Takes MODERATE challenges from all directions  Dynamic: GOOD: Maintains balance through MODERATE excursions of active trunk movement  Stand  Static: FAIR+: Takes MINIMAL challenges from all directions  Dynamic: FAIR: Needs CONTACT GUARD during gait    Additional Treatment Session  N/A    Patient left sitting edge of bed with all lines intact, call button in reach, tray table at bedside, and patient's nurse notified.    Education     Patient educated on the importance of early mobility to prevent functional decline during hospital stay.  Patient educated on and assisted with functional mobility as noted above.  Patient educated on PT Plan of Care and role of PT in acute care.  Patient was instructed to utilize staff assistance for mobility/transfers.  White board updated regarding patient's safest level of mobility with staff assistance    Goals     Multidisciplinary Problems       Physical Therapy Goals          Problem: Physical Therapy    Goal Priority Disciplines Outcome Interventions   Physical Therapy Goal     PT, PT/OT     Description: Goals to be met by: Discharge    Pt. Will increase independence with functional mobility by performin. Supine<>sit with [place].  2. Sit<>stand with MOD I.  3. Pt. Will ambulate x 260 feet with RW and MOD I.                     History:     Past Medical History:   Diagnosis Date    A-fib     Anticoagulant long-term use     Aortic aneurysm      Cataract     CHF (congestive heart failure)     Chronic atrial fibrillation     COPD (chronic obstructive pulmonary disease)     Coronary artery disease     HLD (hyperlipidemia)     Hypertension     Mitral regurgitation     PAD (peripheral artery disease)     Primary open angle glaucoma (POAG)      Past Surgical History:   Procedure Laterality Date    ANGIOGRAM, CORONARY, WITH LEFT HEART CATHETERIZATION N/A 3/26/2024    Procedure: Angiogram, Coronary, with Left Heart Cath;  Surgeon: Adriano Montiel MD;  Location: [facility] CATH LAB;  Service: Cardiology;  Laterality: N/A;    ATHERECTOMY OF PERIPHERAL VESSEL Left 09/12/2022    LEFT SFA ATHERECTOMY, BALLOON ANGIOPLASTY    CATARACT EXTRACTION W/  INTRAOCULAR LENS IMPLANT Left 3/9/2023    Procedure: EXTRACTION, CATARACT, WITH IOL INSERTION;  Surgeon: Georgi Borja MD;  Location[facility] OR;  Service: Ophthalmology;  Laterality: Left;  19.5  mac    EGD, WITH CLOSED BIOPSY  3/22/2024    Procedure: EGD, WITH CLOSED BIOPSY;  Surgeon: Ronald Calderon MD;  Location: [facility] ENDOSCOPY;  Service: Gastroenterology;;    ESOPHAGOGASTRODUODENOSCOPY N/A 3/22/2024    Procedure: EGD;  Surgeon: Ronald Calderon MD;  Location: [facility] ENDOSCOPY;  Service: Gastroenterology;  Laterality: N/A;    Heart Stent N/A     > 10yrs. AGO    INCISION AND DRAINAGE N/A 3/18/2024    Procedure: Incision and Drainage;  Surgeon: Fahad Rivas MD;  Location: [facility];  Service: Urology;  Laterality: N/A;  I&D SCROTAL ABSCESS    INSERTION OF STENT INTO PERIPHERAL VESSEL Right 10/17/2022    RIGHT SFA ATHERECTOMY, BALLOON ANGIOPLASTY, STENT; RIGHT ANTERIOR TIBIAL ARTERY ATHERECTOMY, BALLOON ANGIOPLASTY    ORCHIECTOMY Left 3/18/2024    Procedure: ORCHIECTOMY;  Surgeon: Fahad Rivas MD;  Location: [facility];  Service: Urology;  Laterality: Left;     Time Tracking:     PT Received On: 12/05/24  PT Start Time: 1323     PT Stop Time: 1345  PT Total Time (min): 22 min     Billable  Minutes: Evaluation 22 minutes    12/05/2024

## 2024-12-05 NOTE — ED PROVIDER NOTES
Encounter Date: 12/5/2024       History     Chief Complaint   Patient presents with    Shortness of Breath     SOB and bilateral LE edema since yesterday. Given duoneb in route. 20g left hand. Hx of chf and copd. On 2L NC     Patient with a history of CHF, also reporting history COPD arrives today with worsening lower extremity edema, describing dyspnea, worsening orthopnea.  He reports he has been unable to refill his Entresto.  He is also reporting he is followed by cardiology at [facility].  Patient is denying chest pain, fever, chills, nausea, diaphoresis.  He arrives on 2 L of O2 by nasal cannula.  He reports he has oxygen at home though does not usually require it and has been requiring and over the past 48 hours.        Review of patient's allergies indicates:  No Known Allergies  Past Medical History:   Diagnosis Date    A-fib     Anticoagulant long-term use     Aortic aneurysm     Cataract     CHF (congestive heart failure)     Chronic atrial fibrillation     COPD (chronic obstructive pulmonary disease)     Coronary artery disease     HLD (hyperlipidemia)     Hypertension     Mitral regurgitation     PAD (peripheral artery disease)     Primary open angle glaucoma (POAG)      Past Surgical History:   Procedure Laterality Date    ANGIOGRAM, CORONARY, WITH LEFT HEART CATHETERIZATION N/A 3/26/2024    Procedure: Angiogram, Coronary, with Left Heart Cath;  Surgeon: Adriano Montiel MD;  Location: [facility] CATH LAB;  Service: Cardiology;  Laterality: N/A;    ATHERECTOMY OF PERIPHERAL VESSEL Left 09/12/2022    LEFT SFA ATHERECTOMY, BALLOON ANGIOPLASTY    CATARACT EXTRACTION W/  INTRAOCULAR LENS IMPLANT Left 3/9/2023    Procedure: EXTRACTION, CATARACT, WITH IOL INSERTION;  Surgeon: Georgi Borja MD;  Location: [facility];  Service: Ophthalmology;  Laterality: Left;  19.5  mac    EGD, WITH CLOSED BIOPSY  3/22/2024    Procedure: EGD, WITH CLOSED BIOPSY;  Surgeon: Ronald Calderon MD;  Location:  [facility] ENDOSCOPY;  Service: Gastroenterology;;    ESOPHAGOGASTRODUODENOSCOPY N/A 3/22/2024    Procedure: EGD;  Surgeon: Ronald Calderon MD;  Location: [facility] ENDOSCOPY;  Service: Gastroenterology;  Laterality: N/A;    Heart Stent N/A     > 10yrs. AGO    INCISION AND DRAINAGE N/A 3/18/2024    Procedure: Incision and Drainage;  Surgeon: Fahad Rivas MD;  Location: [facility];  Service: Urology;  Laterality: N/A;  I&D SCROTAL ABSCESS    INSERTION OF STENT INTO PERIPHERAL VESSEL Right 10/17/2022    RIGHT SFA ATHERECTOMY, BALLOON ANGIOPLASTY, STENT; RIGHT ANTERIOR TIBIAL ARTERY ATHERECTOMY, BALLOON ANGIOPLASTY    ORCHIECTOMY Left 3/18/2024    Procedure: ORCHIECTOMY;  Surgeon: Fahad Rivas MD;  Location: [facility];  Service: Urology;  Laterality: Left;     Family History   Problem Relation Name Age of Onset    Cancer Mother      Hypertension Mother      Heart failure Father      Stroke Father      Hypertension Father      No Known Problems Sister       Social History     Tobacco Use    Smoking status: Every Day     Current packs/day: 0.25     Average packs/day: 0.3 packs/day for 40.0 years (10.0 ttl pk-yrs)     Types: Cigarettes     Passive exposure: Current    Smokeless tobacco: Never    Tobacco comments:     States smoke 2-3 cigarettes per day   Substance Use Topics    Alcohol use: Yes     Alcohol/week: 3.0 standard drinks of alcohol     Types: 3 Cans of beer per week     Comment: socially    Drug use: Not Currently     Frequency: 1.0 times per week     Types: Marijuana     Comment: no use in over 1 year     Review of Systems    Physical Exam     Initial Vitals [12/05/24 0836]   BP Pulse Resp Temp SpO2   (!) 136/96 97 (!) 22 96.8 °F (36 °C) 98 %      MAP       --         Physical Exam    Nursing note and vitals reviewed.  Constitutional: He appears well-developed and well-nourished. He is not diaphoretic. No distress.   HENT:   Head: Normocephalic and atraumatic.   Eyes: EOM are normal. Pupils  are equal, round, and reactive to light. Right eye exhibits no discharge. Left eye exhibits no discharge.   Neck: Neck supple. No thyromegaly present. No tracheal deviation present. No JVD present.   Normal range of motion.  Cardiovascular:  Normal rate, regular rhythm, normal heart sounds and intact distal pulses.           No murmur heard.  Pulmonary/Chest: No stridor. No respiratory distress. He has no wheezes. He has no rhonchi. He has rales.   Abdominal: Abdomen is soft. He exhibits no distension. There is no abdominal tenderness. There is no rebound and no guarding.   Musculoskeletal:         General: Edema present. No tenderness. Normal range of motion.      Cervical back: Normal range of motion and neck supple.     Neurological: He is alert and oriented to person, place, and time. He has normal strength. No cranial nerve deficit. GCS score is 15. GCS eye subscore is 4. GCS verbal subscore is 5. GCS motor subscore is 6.   Skin: Skin is warm and dry. Capillary refill takes less than 2 seconds. No rash and no abscess noted. No erythema. No pallor.   Psychiatric: He has a normal mood and affect. His behavior is normal. Judgment and thought content normal.         ED Course   Procedures  Labs Reviewed   COMPREHENSIVE METABOLIC PANEL - Abnormal       Result Value    Sodium 140      Potassium 3.8      Chloride 106      CO2 23      Glucose 98      Blood Urea Nitrogen 25.7      Creatinine 1.45 (*)     Calcium 9.5      Protein Total 7.3      Albumin 3.9      Globulin 3.4      Albumin/Globulin Ratio 1.1      Bilirubin Total 1.8 (*)           ALT 12      AST 21      eGFR 53      Anion Gap 11.0      BUN/Creatinine Ratio 18     B-TYPE NATRIURETIC PEPTIDE - Abnormal    Natriuretic Peptide 1,908.3 (*)    URINALYSIS - Abnormal    Color, UA Light-Yellow      Appearance, UA Turbid (*)     Specific Gravity, UA 1.011      pH, UA 5.5      Protein, UA Trace (*)     Glucose, UA Normal      Ketones, UA Negative      Blood, UA  1+ (*)     Bilirubin, UA Negative      Urobilinogen, UA Normal      Nitrites, UA Negative      Leukocyte Esterase,  (*)     RBC, UA 11-20 (*)     WBC, UA >100 (*)     Bacteria, UA Trace (*)     Squamous Epithelial Cells, UA Trace (*)     Mucous, UA Trace (*)     Hyaline Casts, UA 3-5 (*)    CBC WITH DIFFERENTIAL - Abnormal    WBC 5.45      RBC 3.42 (*)     Hgb 9.4 (*)     Hct 30.2 (*)     MCV 88.3      MCH 27.5      MCHC 31.1 (*)     RDW 21.9 (*)     Platelet 168      MPV 11.3 (*)     Neut % 66.9      Lymph % 20.7      Mono % 10.5      Eos % 1.1      Basophil % 0.4      Lymph # 1.13      Neut # 3.65      Mono # 0.57      Eos # 0.06      Baso # 0.02      IG# 0.02      IG% 0.4      NRBC% 0.0     COMPREHENSIVE METABOLIC PANEL - Abnormal    Sodium 141      Potassium 3.8      Chloride 105      CO2 24      Glucose 89      Blood Urea Nitrogen 24.9      Creatinine 1.36 (*)     Calcium 9.5      Protein Total 7.5      Albumin 4.0      Globulin 3.5      Albumin/Globulin Ratio 1.1      Bilirubin Total 2.0 (*)           ALT 11      AST 21      eGFR 57      Anion Gap 12.0      BUN/Creatinine Ratio 18     IRON AND TIBC - Abnormal    Iron Binding Capacity Unsaturated 363 (*)     Iron Level 47 (*)     Transferrin 374 (*)     Iron Binding Capacity Total 410      Iron Saturation 11 (*)    TROPONIN I - Normal    Troponin-I 0.011     CK - Normal    Creatine Kinase 56     MAGNESIUM - Normal    Magnesium Level 2.10     PHOSPHORUS - Normal    Phosphorus Level 4.1     CULTURE, URINE   CBC W/ AUTO DIFFERENTIAL    Narrative:     The following orders were created for panel order CBC auto differential.  Procedure                               Abnormality         Status                     ---------                               -----------         ------                     CBC with Differential[[id number]]       Abnormal            Final result                 Please view results for these tests on the individual orders.   EXTRA  TUBES    Narrative:     The following orders were created for panel order EXTRA TUBES.  Procedure                               Abnormality         Status                     ---------                               -----------         ------                     Light Blue Top Hold[[id number]]                             Final result               Gold Top Hold[[id number]]                                   Final result                 Please view results for these tests on the individual orders.   LIGHT BLUE TOP HOLD    Extra Tube Hold for add-ons.     GOLD TOP HOLD    Extra Tube Hold for add-ons.          ECG Results              EKG 12-lead (Shortness of Breath) Age > 50 (In process)        Collection Time Result Time QRS Duration OHS QTC Calculation    12/05/24 08:40:41 12/05/24 09:39:39 100 461                     In process by Interface, Lab In [facility] (12/05/24 09:39:43)                   Narrative:    Test Reason : R06.02,    Vent. Rate :  77 BPM     Atrial Rate : 357 BPM     P-R Int :    ms          QRS Dur : 100 ms      QT Int : 408 ms       P-R-T Axes :     62 149 degrees    QTcB Int : 461 ms    Atrial fibrillation with premature ventricular or aberrantly conducted  complexes  Low voltage QRS  Nonspecific T wave abnormality  Prolonged QT  Abnormal ECG  When compared with ECG of 26-Aug-2024 13:39,  Nonspecific T wave abnormality now evident in Inferior leads    Referred By: AAAREFERRAL SELF           Confirmed By:                                   Imaging Results              X-Ray Chest AP Portable (Final result)  Result time 12/05/24 10:01:58      Final result by Marino Frazier MD (12/05/24 10:01:58)                   Impression:      No acute cardiopulmonary process.  Findings of chronic cardiopulmonary disease.      Electronically signed by: Marino Frazier  Date:    12/05/2024  Time:    10:01               Narrative:    EXAMINATION:  XR CHEST AP PORTABLE    CLINICAL HISTORY:  Dyspnea,  unspecified    TECHNIQUE:  Single view of the chest    COMPARISON:  No prior imaging available for comparison.    FINDINGS:  Prominent interstitial markings with no focal opacification.    No pleural effusion or pneumothorax.    The cardiomediastinal silhouette remains enlarged.    No acute osseous abnormality.                                    X-Rays:   Independently Interpreted Readings:   Other Readings:  No acute abnormal on chest x-ray;    Medications   albuterol-ipratropium 2.5 mg-0.5 mg/3 mL nebulizer solution 3 mL (3 mLs Nebulization Given 12/5/24 1510)   aspirin EC tablet 81 mg (has no administration in time range)   atorvastatin tablet 80 mg (80 mg Oral Not Given 12/5/24 1315)   sodium chloride 0.9% flush 10 mL (has no administration in time range)   acetaminophen tablet 650 mg (has no administration in time range)   methylPREDNISolone sodium succinate injection 60 mg (60 mg Intravenous Given 12/5/24 1258)   nitrofurantoin (macrocrystal-monohydrate) 100 MG capsule 100 mg (has no administration in time range)   cetirizine tablet 10 mg (10 mg Oral Given 12/5/24 1248)   clopidogreL tablet 75 mg (75 mg Oral Not Given 12/5/24 1330)   folic acid tablet 1 mg (1 mg Oral Not Given 12/5/24 1330)   metoprolol succinate (TOPROL-XL) 24 hr split tablet 12.5 mg (12.5 mg Oral Given by Other 12/5/24 1351)   midodrine tablet 10 mg (has no administration in time range)   pantoprazole EC tablet 40 mg (40 mg Oral Not Given 12/5/24 1230)   potassium chloride SA CR tablet 20 mEq (has no administration in time range)   rivaroxaban tablet 20 mg (has no administration in time range)   sacubitriL-valsartan 24-26 mg per tablet 1 tablet (has no administration in time range)   nicotine 14 mg/24 hr 1 patch (1 patch Transdermal Patch Applied 12/5/24 1351)   furosemide injection 40 mg (has no administration in time range)   aspirin tablet 325 mg (325 mg Oral Given 12/5/24 0911)   furosemide injection 40 mg (40 mg Intravenous Given  12/5/24 1211)     Medical Decision Making  Amount and/or Complexity of Data Reviewed  Labs: ordered.  Radiology: ordered and independent interpretation performed. Decision-making details documented in ED Course.     Details: No acute abnormal on chest x-ray;  Discussion of management or test interpretation with external provider(s): Inpatient medicine team aware of case, plan admit;    Risk  OTC drugs.  Prescription drug management.  Decision regarding hospitalization.  Risk Details: Risk found sufficient to warrant admission for further evaluation, supervision of clinical course;               ED Course as of 12/05/24 1647   Thu Dec 05, 2024   1026 Moderately elevated BNP; [CT]   1026 Reassuring hemogram; [CT]   1026 Chemistries compatible with CKD; [CT]      ED Course User Index  [CT] Jamin Mandujano MD                           Clinical Impression:  Final diagnoses:  [R06.02] SOB (shortness of breath)  [R06.00] Dyspnea  [Z13.9] Screening due          ED Disposition Condition    Admit                 Jamin Mandujano MD  12/05/24 2246

## 2024-12-06 LAB
ALBUMIN SERPL-MCNC: 3.9 G/DL (ref 3.4–4.8)
ALBUMIN/GLOB SERPL: 1.1 RATIO (ref 1.1–2)
ALP SERPL-CCNC: 113 UNIT/L (ref 40–150)
ALT SERPL-CCNC: 9 UNIT/L (ref 0–55)
ANION GAP SERPL CALC-SCNC: 11 MEQ/L
ANION GAP SERPL CALC-SCNC: 11 MEQ/L
AST SERPL-CCNC: 18 UNIT/L (ref 5–34)
BASOPHILS # BLD AUTO: 0 X10(3)/MCL
BASOPHILS NFR BLD AUTO: 0 %
BILIRUB SERPL-MCNC: 1.7 MG/DL
BUN SERPL-MCNC: 29.5 MG/DL (ref 8.4–25.7)
BUN SERPL-MCNC: 30.6 MG/DL (ref 8.4–25.7)
CALCIUM SERPL-MCNC: 9.3 MG/DL (ref 8.8–10)
CALCIUM SERPL-MCNC: 9.6 MG/DL (ref 8.8–10)
CHLORIDE SERPL-SCNC: 105 MMOL/L (ref 98–107)
CHLORIDE SERPL-SCNC: 107 MMOL/L (ref 98–107)
CO2 SERPL-SCNC: 22 MMOL/L (ref 23–31)
CO2 SERPL-SCNC: 25 MMOL/L (ref 23–31)
CREAT SERPL-MCNC: 1.18 MG/DL (ref 0.72–1.25)
CREAT SERPL-MCNC: 1.27 MG/DL (ref 0.72–1.25)
CREAT/UREA NIT SERPL: 23
CREAT/UREA NIT SERPL: 26
EOSINOPHIL # BLD AUTO: 0 X10(3)/MCL (ref 0–0.9)
EOSINOPHIL NFR BLD AUTO: 0 %
ERYTHROCYTE [DISTWIDTH] IN BLOOD BY AUTOMATED COUNT: 21.9 % (ref 11.5–17)
GFR SERPLBLD CREATININE-BSD FMLA CKD-EPI: >60 ML/MIN/1.73/M2
GFR SERPLBLD CREATININE-BSD FMLA CKD-EPI: >60 ML/MIN/1.73/M2
GLOBULIN SER-MCNC: 3.4 GM/DL (ref 2.4–3.5)
GLUCOSE SERPL-MCNC: 162 MG/DL (ref 82–115)
GLUCOSE SERPL-MCNC: 164 MG/DL (ref 82–115)
HCT VFR BLD AUTO: 29 % (ref 42–52)
HGB BLD-MCNC: 9.2 G/DL (ref 14–18)
IMM GRANULOCYTES # BLD AUTO: 0.02 X10(3)/MCL (ref 0–0.04)
IMM GRANULOCYTES NFR BLD AUTO: 0.4 %
LYMPHOCYTES # BLD AUTO: 0.3 X10(3)/MCL (ref 0.6–4.6)
LYMPHOCYTES NFR BLD AUTO: 6.6 %
MAGNESIUM SERPL-MCNC: 1.9 MG/DL (ref 1.6–2.6)
MCH RBC QN AUTO: 27.8 PG (ref 27–31)
MCHC RBC AUTO-ENTMCNC: 31.7 G/DL (ref 33–36)
MCV RBC AUTO: 87.6 FL (ref 80–94)
MONOCYTES # BLD AUTO: 0.13 X10(3)/MCL (ref 0.1–1.3)
MONOCYTES NFR BLD AUTO: 2.8 %
NEUTROPHILS # BLD AUTO: 4.13 X10(3)/MCL (ref 2.1–9.2)
NEUTROPHILS NFR BLD AUTO: 90.2 %
NRBC BLD AUTO-RTO: 0 %
OHS QRS DURATION: 100 MS
OHS QTC CALCULATION: 461 MS
PHOSPHATE SERPL-MCNC: 3.1 MG/DL (ref 2.3–4.7)
PLATELET # BLD AUTO: 150 X10(3)/MCL (ref 130–400)
PMV BLD AUTO: 11.5 FL (ref 7.4–10.4)
POTASSIUM SERPL-SCNC: 3.4 MMOL/L (ref 3.5–5.1)
POTASSIUM SERPL-SCNC: 4 MMOL/L (ref 3.5–5.1)
PROT SERPL-MCNC: 7.3 GM/DL (ref 5.8–7.6)
RBC # BLD AUTO: 3.31 X10(6)/MCL (ref 4.7–6.1)
SODIUM SERPL-SCNC: 140 MMOL/L (ref 136–145)
SODIUM SERPL-SCNC: 141 MMOL/L (ref 136–145)
WBC # BLD AUTO: 4.58 X10(3)/MCL (ref 4.5–11.5)

## 2024-12-06 PROCEDURE — 25000242 PHARM REV CODE 250 ALT 637 W/ HCPCS

## 2024-12-06 PROCEDURE — 85025 COMPLETE CBC W/AUTO DIFF WBC: CPT

## 2024-12-06 PROCEDURE — 94640 AIRWAY INHALATION TREATMENT: CPT

## 2024-12-06 PROCEDURE — 63600175 PHARM REV CODE 636 W HCPCS

## 2024-12-06 PROCEDURE — 36415 COLL VENOUS BLD VENIPUNCTURE: CPT

## 2024-12-06 PROCEDURE — 25000003 PHARM REV CODE 250

## 2024-12-06 PROCEDURE — S4991 NICOTINE PATCH NONLEGEND: HCPCS | Performed by: EMERGENCY MEDICINE

## 2024-12-06 PROCEDURE — 97116 GAIT TRAINING THERAPY: CPT

## 2024-12-06 PROCEDURE — 94760 N-INVAS EAR/PLS OXIMETRY 1: CPT

## 2024-12-06 PROCEDURE — 84100 ASSAY OF PHOSPHORUS: CPT

## 2024-12-06 PROCEDURE — 27000221 HC OXYGEN, UP TO 24 HOURS

## 2024-12-06 PROCEDURE — 83735 ASSAY OF MAGNESIUM: CPT

## 2024-12-06 PROCEDURE — 97166 OT EVAL MOD COMPLEX 45 MIN: CPT

## 2024-12-06 PROCEDURE — 21400001 HC TELEMETRY ROOM

## 2024-12-06 PROCEDURE — 25000003 PHARM REV CODE 250: Performed by: EMERGENCY MEDICINE

## 2024-12-06 PROCEDURE — 94761 N-INVAS EAR/PLS OXIMETRY MLT: CPT

## 2024-12-06 PROCEDURE — 80053 COMPREHEN METABOLIC PANEL: CPT

## 2024-12-06 RX ORDER — MIDODRINE HYDROCHLORIDE 5 MG/1
10 TABLET ORAL 2 TIMES DAILY WITH MEALS
Status: DISCONTINUED | OUTPATIENT
Start: 2024-12-06 | End: 2024-12-09 | Stop reason: HOSPADM

## 2024-12-06 RX ORDER — IPRATROPIUM BROMIDE AND ALBUTEROL SULFATE 2.5; .5 MG/3ML; MG/3ML
3 SOLUTION RESPIRATORY (INHALATION)
Status: DISCONTINUED | OUTPATIENT
Start: 2024-12-06 | End: 2024-12-07

## 2024-12-06 RX ADMIN — MIDODRINE HYDROCHLORIDE 10 MG: 5 TABLET ORAL at 05:12

## 2024-12-06 RX ADMIN — RIVAROXABAN 20 MG: 10 TABLET, FILM COATED ORAL at 05:12

## 2024-12-06 RX ADMIN — NICOTINE 1 PATCH: 14 PATCH, EXTENDED RELEASE TRANSDERMAL at 08:12

## 2024-12-06 RX ADMIN — POTASSIUM BICARBONATE 50 MEQ: 977.5 TABLET, EFFERVESCENT ORAL at 08:12

## 2024-12-06 RX ADMIN — NITROFURANTOIN MONOHYDRATE/MACROCRYSTALS 100 MG: 75; 25 CAPSULE ORAL at 08:12

## 2024-12-06 RX ADMIN — FUROSEMIDE 40 MG: 10 INJECTION, SOLUTION INTRAMUSCULAR; INTRAVENOUS at 06:12

## 2024-12-06 RX ADMIN — PANTOPRAZOLE SODIUM 40 MG: 40 TABLET, DELAYED RELEASE ORAL at 08:12

## 2024-12-06 RX ADMIN — ASPIRIN 81 MG: 81 TABLET, COATED ORAL at 08:12

## 2024-12-06 RX ADMIN — IPRATROPIUM BROMIDE AND ALBUTEROL SULFATE 3 ML: .5; 3 SOLUTION RESPIRATORY (INHALATION) at 07:12

## 2024-12-06 RX ADMIN — IPRATROPIUM BROMIDE AND ALBUTEROL SULFATE 3 ML: .5; 3 SOLUTION RESPIRATORY (INHALATION) at 11:12

## 2024-12-06 RX ADMIN — METHYLPREDNISOLONE SODIUM SUCCINATE 60 MG: 40 INJECTION, POWDER, FOR SOLUTION INTRAMUSCULAR; INTRAVENOUS at 12:12

## 2024-12-06 RX ADMIN — SACUBITRIL AND VALSARTAN 1 TABLET: 24; 26 TABLET, FILM COATED ORAL at 08:12

## 2024-12-06 RX ADMIN — CETIRIZINE HYDROCHLORIDE 10 MG: 10 TABLET, FILM COATED ORAL at 08:12

## 2024-12-06 RX ADMIN — MIDODRINE HYDROCHLORIDE 10 MG: 5 TABLET ORAL at 08:12

## 2024-12-06 RX ADMIN — METOPROLOL SUCCINATE 12.5 MG: 25 TABLET, EXTENDED RELEASE ORAL at 08:12

## 2024-12-06 RX ADMIN — CLOPIDOGREL BISULFATE 75 MG: 75 TABLET ORAL at 08:12

## 2024-12-06 RX ADMIN — ATORVASTATIN CALCIUM 80 MG: 40 TABLET, FILM COATED ORAL at 08:12

## 2024-12-06 RX ADMIN — IPRATROPIUM BROMIDE AND ALBUTEROL SULFATE 3 ML: .5; 3 SOLUTION RESPIRATORY (INHALATION) at 04:12

## 2024-12-06 RX ADMIN — FOLIC ACID 1 MG: 1 TABLET ORAL at 09:12

## 2024-12-06 RX ADMIN — METHYLPREDNISOLONE SODIUM SUCCINATE 60 MG: 40 INJECTION, POWDER, FOR SOLUTION INTRAMUSCULAR; INTRAVENOUS at 01:12

## 2024-12-06 RX ADMIN — ACETAMINOPHEN 650 MG: 325 TABLET, FILM COATED ORAL at 07:12

## 2024-12-06 NOTE — CONSULTS
Inpatient Nutrition Assessment    Admit Date: 12/5/2024   Total duration of encounter: 1 day   Patient Age: 69 y.o.    Nutrition Recommendation/Prescription     Continue heart healthy diet  Pt education on diet/complete  Mvi/fe  Biweekly wt  Will monitor nutrition status     Communication of Recommendations: reviewed with nurse and reviewed with patient    Nutrition Assessment     Malnutrition Assessment/Nutrition-Focused Physical Exam       Malnutrition Level: other (see comments) (Does not meet criteria) (12/06/24 1335)  Energy Intake (Malnutrition): other (see comments) (Does not meet criteria) (12/06/24 1335)  Weight Loss (Malnutrition): other (see comments) (Does not meet criteria) (12/06/24 1335)     Orbital Region (Subcutaneous Fat Loss): well nourished  Upper Arm Region (Subcutaneous Fat Loss): well nourished           Clavicle Bone Region (Muscle Loss): well nourished                    Fluid Accumulation (Malnutrition): moderate (12/06/24 1335)     Hand  Strength, Right (Malnutrition): Unable to assess (12/06/24 1335)  A minimum of two characteristics is recommended for diagnosis of either severe or non-severe malnutrition.    Chart Review    Reason Seen: continuous nutrition monitoring and physician consult for education     Malnutrition Screening Tool Results   Have you recently lost weight without trying?: No  Have you been eating poorly because of a decreased appetite?: No   MST Score: 0   Diagnosis:  CHF, SOB, COPD, hypokalemia, cystitis, recent fall, anemia, Afib, hypotension, GERD, HLD    Relevant Medical History: Afib, CHF, COPD, CAD, HTN, mitral regurgitation     Scheduled Medications:  albuterol-ipratropium, 3 mL, Q6H WAKE  atorvastatin, 80 mg, Daily  cetirizine, 10 mg, Daily  clopidogreL, 75 mg, Daily  folic acid, 1 mg, Daily  methylPREDNISolone injection (PEDS and ADULTS), 60 mg, Q12H  metoprolol succinate, 12.5 mg, Daily  midodrine, 10 mg, BID WM  nicotine, 1 patch, Daily  nitrofurantoin  "(macrocrystal-monohydrate), 100 mg, Q12H  pantoprazole, 40 mg, BID  rivaroxaban, 20 mg, Daily  sacubitriL-valsartan, 1 tablet, BID    Continuous Infusions:   PRN Medications:  acetaminophen, 650 mg, Q4H PRN  sodium chloride 0.9%, 10 mL, PRN    Calorie Containing IV Medications: no significant kcals from medications at this time    Recent Labs   Lab 24  0923 24  1224 24  0328    141 140   K 3.8 3.8 3.4*   CALCIUM 9.5 9.5 9.3   PHOS  --  4.1 3.1   MG  --  2.10 1.90    105 107   CO2 23 24 22*   BUN 25.7 24.9 29.5*   CREATININE 1.45* 1.36* 1.27*   EGFRNORACEVR 53 57 >60   GLUCOSE 98 89 164*   BILITOT 1.8* 2.0* 1.7*   ALKPHOS 114 125 113   ALT 12 11 9   AST 21 21 18   ALBUMIN 3.9 4.0 3.9   WBC 5.45  --  4.58   HGB 9.4*  --  9.2*   HCT 30.2*  --  29.0*     Nutrition Orders:  Diet Heart Healthy      Appetite/Oral Intake: good/% of meals  Factors Affecting Nutritional Intake: shortness of breath  Social Needs Impacting Access to Food: none identified  Food/Catholic/Cultural Preferences: none reported  Food Allergies: none reported  Last Bowel Movement: 24  Wound(s):  none reported     Comments    () Received consult for pt education; pt reported he is eating well; on regular diet at home; "needs little salt in food"; wt fluctuates with fluid; currently elevated to 233#. + 3 edema. Discussed heart healthy diet; copy of diet provided for reference.     Anthropometrics    Height: 6' (182.9 cm), Height Method: Stated  Last Weight: (S) 106 kg (233 lb 11 oz) (done with standing scale. previous weight could be an error) (24 0609), Weight Method: Stated  BMI (Calculated): 31.7  BMI Classification: obese grade I (BMI 30-34.9)        Ideal Body Weight (IBW), Male: 178 lb     % Ideal Body Weight, Male (lb): 123.61 %                 Usual Body Weight (UBW), k.8 kg  % Usual Body Weight: 106.43     Usual Weight Provided By: patient and EMR weight history    Wt Readings from Last 5 " Encounters:   12/06/24 (S) 106 kg (233 lb 11 oz)   09/03/24 93.7 kg (206 lb 9.6 oz)   08/26/24 93 kg (205 lb 0.4 oz)   08/26/24 93.4 kg (206 lb)   08/18/24 94.9 kg (209 lb 3.5 oz)     Weight Change(s) Since Admission: no wt loss   Wt Readings from Last 1 Encounters:   12/06/24 0609 (S) 106 kg (233 lb 11 oz)   12/05/24 1235 99.8 kg (220 lb 0.3 oz)   12/05/24 0836 99.8 kg (220 lb)   Admit Weight: 99.8 kg (220 lb) (12/05/24 0836), Weight Method: Stated    Estimated Needs    Weight Used For Calorie Calculations: 106 kg (233 lb 11 oz)  Energy Calorie Requirements (kcal): 2120 kcal/d; 20 tyron/kg /obese  Energy Need Method: Kcal/kg  Weight Used For Protein Calculations: 80.9 kg (178 lb 5.6 oz) (based on IBW)  Protein Requirements: 97 gm protein/d; 1.2 gm/kg IBW/obese  Fluid Requirements (mL): 2120 ml/d; 1ml/tyron        Enteral Nutrition     Patient not receiving enteral nutrition at this time.    Parenteral Nutrition     Patient not receiving parenteral nutrition support at this time.    Evaluation of Received Nutrient Intake    Calories: meeting estimated needs  Protein: meeting estimated needs    Patient Education     Education Provided: heart healthy diet  Teaching Method: explanation and printed materials  Comprehension: verbalizes understanding  Barriers to Learning: desire/motivation  Expected Compliance: fair  Comments: All questions were answered and dietitian's contact information was provided.     Nutrition Diagnosis     PES: Food and nutrition related knowledge deficit related to lack of prior nutrition-related education as evidenced by verbalized limited understanding of diet . (new)     PES:            Nutrition Interventions     Intervention(s): modified composition of meals/snacks, multivitamin/mineral supplement therapy, purpose of nutrition education, and collaboration with other providers  Intervention(s):      Goal: Meet greater than 80% of nutritional needs by follow-up. (new)  Goal: Verbalize  [FreeTextEntry1] : Self-referral \par \par 28 y.o F with no significant PMHx who is a resident from [facility] presented to the office today for tachycardia post-covid-19 infection in 01/2021. \par \par She states right after covid-19 infection, her baseline HR is 110-120/min and HR became 160-170/min with exertion. She also reports MARY after climbing two flights of stairs; denies any MARY on walking level ground. She denies any leg swelling, orthopnea or PND; denies any chest pain on exertion; denies any pleuritic chest pain or pleurisy. Currenlty her baseline HR slightly improved; HR is  mmHg at rest, HR 110s with mild exertioan and HR can go as high as 160/min w/ modeate exertion. \par \par She has gained 5-10Lb during the pandemic.  She currently exercise by doing weight lifting and cardio 3-4 x week 1.5 hrs each session; no issue with exercising. \par \par Lifestyle History:\par Mediterranean Diet Score (9 question survey) was 5.  \par (8-9: optimal, 6-7: near-optimal, 4-5: suboptimal, 0-3: markedly suboptimal)\par Exercise: Patient reports exercising at a moderate level for  minutes per week. \par Smoking: denies \par Stress: Patient denies any stress. \par Depression: denies \par Sleep apnea: denies \par Family Hx: \par Mom has non-obstructive CAD diagnosed with CTA coronaries \par \par Women's health \par - denies hx of PCOS \par - no pregnancy \par  understanding of diet by discharge. (new)    Nutrition Goals & Monitoring     Dietitian will monitor: food and beverage intake and weight  Discharge planning: continue heart healthy diet  Nutrition Risk/Follow-Up: low (follow-up in 5-7 days)   Please consult if re-assessment needed sooner.

## 2024-12-06 NOTE — PT/OT/SLP EVAL
Occupational Therapy   Evaluation and Discharge Note    Name: Ran Reyes Jr.  MRN: [id number]  Admitting Diagnosis: CHF exacerbation  Recent Surgery: * No surgery found *      Recommendations:     Discharge Recommendations: Low Intensity Therapy (PT only)  Discharge Equipment Recommendations: shower chair, other (see comments) (pt has all other DMe needed)  Barriers to discharge:  None    Assessment:     Ran Reyes Jr. is a 69 y.o. male with a medical diagnosis of CHF exacerbation. At this time, patient is functioning at their prior level of function regarding ADLs (modified independent with use of DME) and does not require further acute OT services.     Plan:     During this hospitalization, patient does not require further acute OT services.  Please re-consult if situation changes.    Plan of Care Reviewed with: patient    Subjective     Chief Complaint: low back pain radiating down BLE into his feet, R neck pain  Patient/Family Comments/goals: DC home independently, pain relief    Occupational Profile:  Living Environment: 1st floor apartment alone, no steps, tub/shower combo  Previous level of function: mod I with ADLs and mobility using rollator as needed  Roles and Routines: pt's sister provides transportation or Pt uses public transport, does his own light cooking and cleaning  Equipment Used at home: rollator, oxygen, cane, straight, blood pressure machine, nebulizer, other (see comments) (pt does not use cane.  [facility]  OT issued reacher and sock aid, pt independent with use of both for LB dressing after session today.  Assistance upon Discharge: pt's sister    Pain/Comfort:  Pain Rating 1: 6/10  Location - Side 1: Bilateral  Location 1:  (lower back, radiating down BLE into feet)  Pain Addressed 1: Reposition, Distraction, Cessation of Activity  Pain Rating Post-Intervention 1: 3/10  Pain Rating 2: 6/10  Location - Side 2: Right  Location - Orientation 2:  lateral  Location 2: neck  Pain Addressed 2: Reposition, Distraction, Cessation of Activity  Pain Rating Post-Intervention 2: 3/10    Patients cultural, spiritual, Nondenominational conflicts given the current situation: no    Objective:     Communicated with: nurse Mora prior to session.  Patient found HOB elevated with telemetry, oxygen upon OT entry to room.    General Precautions: Standard, fall  Orthopedic Precautions: N/A  Braces: N/A  Respiratory Status: Nasal cannula, flow 2 L/min   Initial vital signs seated EOB:  O2 sat 100%, /80  Final vital signs after ambulating over 130 ft in knox:  O2 sat 100% (on 2L nasal cannula during ambulation), BP 1221/69, HR 73    Occupational Performance:    Bed Mobility:    Patient completed Supine to Sit with modified independence    Functional Mobility/Transfers:  Patient completed Sit <> Stand Transfer with supervision  with  no assistive device   Functional Mobility: CGA to ambulate in knox using RW, over 130 ft (see PT note)    Activities of Daily Living:  Feeding:  independence .  Grooming: supervision standing at sink  Upper Body Dressing: modified independence seated EOB  Lower Body Dressing: modified independence using sock aid (OT explained and partially demonstrated, pt able to return demonstration to don socks seated at chair with no further cues)  Toileting: modified independence using urinal or at toilet per pt    Cognitive/Visual Perceptual:  Cognitive/Psychosocial Skills:     -       Oriented to: Person, Place, Time, and Situation   -       Follows Commands/attention:Follows two-step commands  -       Safety awareness/insight to disability: impaired   -       Mood/Affect/Coping skills/emotional control: Cooperative, Anxious, and Pleasant    Physical Exam:  BUE AROM WNL, strength 5/5, FM coordination intact    Treatment & Education:  Pt. educated on POC, orientation to environment, use of call bell for assist with transfers OOB or for any other needs due to  fall risk.  Pt verbalized understanding.    Patient left  seated at chair with PT in room, beginning PT session,  with all lines intact, call button in reach, and PT Ann present    GOALS:   Multidisciplinary Problems       Occupational Therapy Goals       Not on file                    History:     Past Medical History:   Diagnosis Date    A-fib     Anticoagulant long-term use     Aortic aneurysm     Cataract     CHF (congestive heart failure)     Chronic atrial fibrillation     COPD (chronic obstructive pulmonary disease)     Coronary artery disease     HLD (hyperlipidemia)     Hypertension     Mitral regurgitation     PAD (peripheral artery disease)     Primary open angle glaucoma (POAG)          Past Surgical History:   Procedure Laterality Date    ANGIOGRAM, CORONARY, WITH LEFT HEART CATHETERIZATION N/A 3/26/2024    Procedure: Angiogram, Coronary, with Left Heart Cath;  Surgeon: Adriano Montiel MD;  Location: [facility] CATH LAB;  Service: Cardiology;  Laterality: N/A;    ATHERECTOMY OF PERIPHERAL VESSEL Left 09/12/2022    LEFT SFA ATHERECTOMY, BALLOON ANGIOPLASTY    CATARACT EXTRACTION W/  INTRAOCULAR LENS IMPLANT Left 3/9/2023    Procedure: EXTRACTION, CATARACT, WITH IOL INSERTION;  Surgeon: Georgi Borja MD;  Location: [facility];  Service: Ophthalmology;  Laterality: Left;  19.5  mac    EGD, WITH CLOSED BIOPSY  3/22/2024    Procedure: EGD, WITH CLOSED BIOPSY;  Surgeon: Ronald Calderon MD;  Location: [facility] ENDOSCOPY;  Service: Gastroenterology;;    ESOPHAGOGASTRODUODENOSCOPY N/A 3/22/2024    Procedure: EGD;  Surgeon: Ronald Calderon MD;  Location: [facility] ENDOSCOPY;  Service: Gastroenterology;  Laterality: N/A;    Heart Stent N/A     > 10yrs. AGO    INCISION AND DRAINAGE N/A 3/18/2024    Procedure: Incision and Drainage;  Surgeon: Fahad Rivas MD;  Location: [facility];  Service: Urology;  Laterality: N/A;  I&D SCROTAL ABSCESS    INSERTION OF STENT INTO PERIPHERAL VESSEL Right  10/17/2022    RIGHT SFA ATHERECTOMY, BALLOON ANGIOPLASTY, STENT; RIGHT ANTERIOR TIBIAL ARTERY ATHERECTOMY, BALLOON ANGIOPLASTY    ORCHIECTOMY Left 3/18/2024    Procedure: ORCHIECTOMY;  Surgeon: Fahad Rivas MD;  Location: [facility];  Service: Urology;  Laterality: Left;       Time Tracking:     OT Date of Treatment: 12/06/24  OT Start Time: 1021  OT Stop Time: 1045  OT Total Time (min): 24 min    Billable Minutes:Evaluation 24, moderate    12/6/2024

## 2024-12-06 NOTE — PT/OT/SLP PROGRESS
Physical Therapy Treatment    Patient Name:  Ran Reyes Jr.   MRN:  [id number]    Recommendations     Therapy Intensity Recommendations at Discharge: Low Intensity Therapy  Discharge Equipment Recommendations: shower chair   Barriers to discharge: fall risk, decreased endurance, and decreased safety awareness    Assessment     Ran Reyes Jr. is a 69 y.o. male admitted with a medical diagnosis of  CHF exacerbation.  1. Wheezing    2. SOB (shortness of breath)    3. Dyspnea    4. Screening due    5. CHF (congestive heart failure)    6. Acute on chronic congestive heart failure, unspecified heart failure type    7. History of COPD       Patient Active Problem List   Diagnosis    Primary open angle glaucoma (POAG) of right eye, moderate stage    Combined forms of age-related cataract of left eye    Arteriosclerosis of coronary artery    Chronic atrial fibrillation    Dyslipidemia    Hypertension    Tobacco use    PVD (peripheral vascular disease)    VHD (valvular heart disease)    COVID-19    HFrEF (heart failure with reduced ejection fraction)    Nonrheumatic mitral valve regurgitation    Postoperative eye state    Scrotal hematoma    Chronic obstructive pulmonary disease, unspecified    Lesion of external ear    Low back pain    Other thrombophilia    Secondary hyperaldosteronism    Positive colorectal cancer screening using Cologuard test    Acute heart failure    History of COPD    CHF exacerbation    Wheezing    Acute cystitis with hematuria      He presents with the following impairments/functional limitations:  weakness, impaired endurance, impaired self care skills, impaired functional mobility, pain.    Rehab Prognosis: Good.    Patient would benefit from continued skilled acute PT services to: address above listed impairments/functional limitations; receive patient/caregiver education; reduce fall risk; and maximize independency/safety with functional mobility.    Recent Surgery: * No surgery found *       Plan     During this hospitalization, patient to be seen 5 x/week to address the identified impairments/functional limitations via gait training, therapeutic activities, therapeutic exercises and progress toward the established goals.    Plan of Care Expires:  01/04/25    Subjective     Communicated with patient's nurse (Morgan) prior to session.    Patient agreeable to participate in treatment session.    Chief Complaint: pain in neck, low back, and legs  Patient/Family Comments/goals: Feel better.  Pain/Comfort:  Pain Rating 1: 6/10  Location - Side 1: Right  Location 1: neck (Pt. also with complaints with B LE pain)  Pain Addressed 1: Reposition, Distraction  Pain Rating Post-Intervention 1: 6/10  Pain Rating 2: 6/10  Location 2:  (low back pain)    Objective     Patient found sitting edge of bed with telemetry, oxygen  upon PT entry to room.    General Precautions: Standard, fall   Orthopedic Precautions:N/A   Braces:  N/A  Respiratory Status: room air    Functional Mobility:    Bed Mobility:  Seated in edge of bed at start of session and left in chair at end of session    Transfers:  Sit to Stand: stand by assistance with rolling walker  Stand to Sit: stand by assistance with rolling walker  Bed to Chair: stand by assistance with rolling walker using Stand Pivot    Gait:  Patient ambulated 130 and 260ft with rolling walker and contact guard assistance and 2 L O2 in tow.  Patient demonstrates :       steady gait       no loss of balance       no mis-steps.  Pt. Exhibited increase shortness of breath, and required seated rest break    Vital Signs:  Pre-Activity:  BP: 124/80; O2: 100%  Post-Activity:  BP: 121/69; O2: 100%    Other Mobility:  N/A    Patient left sitting in chair with all lines intact, call button in reach, tray table at bedside, patient's nurse notified, and chair alarm on.    Education     Patient educated on and assisted with functional mobility as noted above.  Patient educated on PT Plan  of Care.  Patient was instructed to utilize staff assistance for mobility/transfers.  White board updated regarding patient's safest level of mobility with staff assistance    Goals     Multidisciplinary Problems       Physical Therapy Goals          Problem: Physical Therapy    Goal Priority Disciplines Outcome Interventions   Physical Therapy Goal     PT, PT/OT Progressing    Description: Goals to be met by: Discharge    Pt. Will increase independence with functional mobility by performin. Supine<>sit with [place].  2. Sit<>stand with MOD I.  3. Pt. Will ambulate x 260 feet with RW and MOD I.                     Time Tracking     PT Received On: 24  PT Start Time: 1027     PT Stop Time: 1057  PT Total Time (min): 30 min     Billable Minutes: Gait Training 30 minutes    2024

## 2024-12-06 NOTE — PROGRESS NOTES
[facility] Medicine Wards   Progress Note      Resident Team: [facility] Family Medicine List 2  Attending Physician: Terry Boyd MD  Resident: Valerio Shrestha  Intern: Diana Norman   [facility] Length of Stay: 1 days    Subjective   Brief HPI:  Ran Reyes Jr. is a 69 y.o. male with PMH significant for atrial fibrillation on Xarelto, CHF (EF 22% 8/2024), COPD on 2L home oxygen, CAD, HTN, mitral regurgitation, and PAD, who presented to [facility] ED on 12/5/2024  with a primary complaint of shortness of breath. Reports increasing shortness of breath with orthopnea and dyspnea on exertion for the past two days. He noticed an increase in his lower extremity swelling bilaterally. He also complains of tightness of his abdomen but denies abdominal pain or tenderness. He states that he ran out of Entresto recently and thus has not been taking it; reports adherence to all other medications. For his COPD, he denies being on any maintenance or rescue inhalers. Denies recent illness or sick contact. Denies change in diet and denies consuming an increase in salty foods. Denies chest pain, nausea, vomiting, constipation, diarrhea, or difficulty with urination. Patient was having increasing difficulty with breathing so he called 911. He was given a Duoneb treatment en route to the ED.     Patient also reports feeling weak and dizzy after getting out of his vehicle yesterday at [employer]. States that he normally has to wait a few seconds after standing to be sure that he is steady on his feet. He got out of his car quickly and felt dizzy while walking into the store. States that bystanders helped him as he fell but he hit the back of his head on a post. Denies loss of consciousness. Denies headache and was able to walk unassisted after the fall.      ED Course - Upon arrival, patient afebrile T 96.8 F, /96, HR 97, RR 22, SpO2 98% on 2L NC. Workup significant for normocytic anemia H/H 9.4/30.2 (baseline around 12/38), creatinine  1.45 (baseline 1.3), total bilirubin 1.8, BNP 1908 (2531.8 3 months ago). UA with trace protein, 1+ blood, 500 leukocyte esterase, 11-20 RBC, >100 WBC, trace bacteria, 3-5 hyaline casts. Troponin and CK WNL. CXR performed showing prominent interstitial markings. EKG performed showing atrial fibrillation with PVCs, rate 77 bpm. He was given aspirin 325 mg and Lasix 40 mg IV in the ED. Internal medicine was consulted for further management    Interval History: CT head performed yesterday negative for intracranial abnormality. Had 1.8 L output since yesterday with one dose of Lasix 40 mg IV. This morning, states that shortness of breath is significantly improved. Reports an increase in clear sputum this morning. Denies chest pain, abdominal pain, nausea, vomiting, dysuria.        Objective     Vital Signs (Most Recent):  Temp: 97.8 °F (36.6 °C) (12/06/24 0317)  Pulse: 83 (12/06/24 0452)  Resp: 20 (12/06/24 0452)  BP: 120/86 (12/06/24 0317)  SpO2: 98 % (12/06/24 0452) Vital Signs (24h Range):  Temp:  [96.8 °F (36 °C)-98.5 °F (36.9 °C)] 97.8 °F (36.6 °C)  Pulse:  [63-97] 83  Resp:  [17-33] 20  SpO2:  [95 %-100 %] 98 %  BP: (120-136)/(77-96) 120/86     Physical Examination:  Physical Exam  Constitutional:       General: He is not in acute distress.     Appearance: He is not ill-appearing.      Comments: Sitting up in chair next to bed   HENT:      Head: Normocephalic and atraumatic.      Mouth/Throat:      Pharynx: Oropharynx is clear.   Neck:      Vascular: JVD present.   Cardiovascular:      Rate and Rhythm: Normal rate. Rhythm irregular.      Pulses: Normal pulses.      Heart sounds: Normal heart sounds. No murmur heard.  Pulmonary:      Effort: Pulmonary effort is normal.      Breath sounds: Wheezing (Scattered expiratory wheezes, significantly improved from yesterday) and rales (Bibasilar rales) present. No rhonchi.      Comments: Nebulizer mask in place  Abdominal:      General: Abdomen is protuberant. Bowel sounds  are normal.      Palpations: Abdomen is soft.      Tenderness: There is no abdominal tenderness. There is no guarding or rebound.   Musculoskeletal:      Right lower le+ Pitting Edema present.      Left lower le+ Pitting Edema present.   Skin:     General: Skin is warm and dry.   Neurological:      Mental Status: He is alert.         Laboratory:  Most Recent Data:  CBC:   Recent Labs   Lab 24  0923 24  0328   WBC 5.45 4.58   HGB 9.4* 9.2*   HCT 30.2* 29.0*    150     CMP:   Recent Labs   Lab 24  0328   CALCIUM 9.3   ALBUMIN 3.9      K 3.4*   CO2 22*      BUN 29.5*   CREATININE 1.27*   ALKPHOS 113   ALT 9   AST 18   BILITOT 1.7*       Radiology:  Imaging Results              X-Ray Chest AP Portable (Final result)  Result time 24 10:01:58      Final result by Marino Frazier MD (24 10:01:58)                   Impression:      No acute cardiopulmonary process.  Findings of chronic cardiopulmonary disease.      Electronically signed by: Marino Frazier  Date:    2024  Time:    10:01               Narrative:    EXAMINATION:  XR CHEST AP PORTABLE    CLINICAL HISTORY:  Dyspnea, unspecified    TECHNIQUE:  Single view of the chest    COMPARISON:  No prior imaging available for comparison.    FINDINGS:  Prominent interstitial markings with no focal opacification.    No pleural effusion or pneumothorax.    The cardiomediastinal silhouette remains enlarged.    No acute osseous abnormality.                                      Current Medications:     Infusions:       Scheduled:   albuterol-ipratropium  3 mL Nebulization Q4H    aspirin  81 mg Oral Daily    atorvastatin  80 mg Oral Daily    cetirizine  10 mg Oral Daily    clopidogreL  75 mg Oral Daily    folic acid  1 mg Oral Daily    methylPREDNISolone injection (PEDS and ADULTS)  60 mg Intravenous Q12H    metoprolol succinate  12.5 mg Oral Daily    midodrine  10 mg Oral TID WM    nicotine  1 patch Transdermal  Daily    nitrofurantoin (macrocrystal-monohydrate)  100 mg Oral Q12H    pantoprazole  40 mg Oral BID    potassium chloride SA  20 mEq Oral BID    rivaroxaban  20 mg Oral Daily    sacubitriL-valsartan  1 tablet Oral BID        PRN:    Current Facility-Administered Medications:     acetaminophen, 650 mg, Oral, Q4H PRN    sodium chloride 0.9%, 10 mL, Intravenous, PRN      Intake/Output Summary (Last 24 hours) at 12/6/2024 0627  Last data filed at 12/6/2024 0319  Gross per 24 hour   Intake 640 ml   Output 1800 ml   Net -1160 ml       Lines/Drains/Airways       Peripheral Intravenous Line  Duration                  Peripheral IV - Single Lumen 12/05/24 0846 18 G Left;Posterior Hand <1 day                    Assessment and Plan    Acute CHF exacerbation  - TTE 8/14/24 EF 22%, PASP 51 mmHg, elevated venous pressure 15 mmHg  - Repeat TTE 12/5/24: EF 30%, PASP 44 mmHg, venous pressure 8 mmHg  - S/p Lasix 40 mg IV in ED  - On Lasix 40 mg PO BID at home  - Strict I&Os, daily weights  - Fluid restriction, low sodium diet  - Continue home Entresto and metoprolol succinate 12.5 mg daily  - Holding home spironolactone  - COVID/Flu/RSV negative  - Giving Lasix 40 IV today and monitoring output     Shortness of breath  COPD  - S/p Duoneb x 1 en route to ED without improvement in shortness of breath or wheezing  - Audibly expiratory wheezes and diffuse expiratory wheezes on auscultation  - Scheduled Duonebs q4h  - On home 2L NC, goal saturations 88-92%  - Continue Solumedrol 60 mg IV q12h    Hypokalemia  - Holding home Kcl, repleting as needed     Acute cystitis with hematuria  - UA with trace protein, 1+ blood, 500 leukocyte esterase, 11-20 RBC, >100 WBC, trace bacteria, 3-5 hyaline casts  - Urine culture from August with E. Coli ESBL sensitive to Macrobid  - Conitnue Macrobid 100 mg q12h     Recent Fall  - Fall yesterday, hit posterior skull on a pole. Denies LOC and was able to ambulate unassisted after  - Likely a result of  orthostatic hypotension  - No neuro deficits appreciated on exam  - No spinal or paraspinal muscle tenderness of cervical, thoracic, or lumbar spine  - Ct head negative for acute intracranial process     Normocytic anemia  - H/H 9.4/30.2, MCV 88. Previously 12.4/38.5 3 months ago  - On home folate supplementation  - Iron studies suggestive of NANDO. From chart review, has history of NANDO and underwent EGD 3/22/24: reflux esophagitis without bleeding, chronic gastritis, chronic duodenitis     Atrial fibrillation, rate controlled  - Continue home Xarelto, ASA, Plavix, metoprolol  - On cardiac monitoring     Hypotension  - Normotensive to mildly hypertensive in ED  - Continue home midodrine 10 mg TIDWM     History of AAA  - Last CT abd/pelvis 4/26/24: 3.8 cm saccular abdominal aneurysm measuring 3.8 cm  - AAA screening US performed 1/2023. Consider follow up outpatient  - If increase in pain or hemodynamic instability, can consider inpatient     GERD  - Continue home Protonix     HLD  - Continue home Lipitor        CODE STATUS: Full  Access: pIV  Antibiotics: None  Diet: Heart Healthy  DVT Prophylaxis: Xarelto  GI Prophylaxis: Protonix  Fluids: None      Disposition: 69 year old male admitted for CHF exacerbation and management of shortness of breath. Scheduled duonebs q4h and diuresing. Discharge pending course.         Diana Hassan MD  [facility] -I

## 2024-12-06 NOTE — HOSPITAL COURSE
Patient was admitted for management CHF exacerbation and shortness of breath. He completed Duonebs q4h overnight with significant improvement in subjective shortness of breath and wheezing on auscultation. He has completed 4 days on Solumedrol 60 mg BID during admission. He was diuresed with Lasix with a total output of -6,815 L and significant improvement in lower extremity and abdominal edema. His home midodrine was tapered from three times daily to twice daily on 12/6. He was started on Macrobid for UTI. He remained afebrile and hemodynamically stable for the duration of admission. On day of discharge, patient is afebrile, no longer wheezing and has diuresed to well. He is now stable for discharge.     Patient must complete course of macrobid bid x 7 days. He will continue po prednisone x 1 day to complete 5 day course. He has been instructed to decrease the midorine from BID to once and then 3 days later discontinue as his blood pressure tolerates. Instructed to check blood pressure twice daily at home and bring records to hospital follow up visit. Entresto dose lowered to 24-26mg. ED precaution given for hypotension.

## 2024-12-07 LAB
ALBUMIN SERPL-MCNC: 4 G/DL (ref 3.4–4.8)
ALBUMIN/GLOB SERPL: 1.2 RATIO (ref 1.1–2)
ALP SERPL-CCNC: 104 UNIT/L (ref 40–150)
ALT SERPL-CCNC: 11 UNIT/L (ref 0–55)
ANION GAP SERPL CALC-SCNC: 12 MEQ/L
ANISOCYTOSIS BLD QL SMEAR: ABNORMAL
AST SERPL-CCNC: 17 UNIT/L (ref 5–34)
BACTERIA UR CULT: ABNORMAL
BASOPHILS # BLD AUTO: 0 X10(3)/MCL
BASOPHILS NFR BLD AUTO: 0 %
BILIRUB SERPL-MCNC: 1.3 MG/DL
BUN SERPL-MCNC: 32.9 MG/DL (ref 8.4–25.7)
CALCIUM SERPL-MCNC: 9.3 MG/DL (ref 8.8–10)
CHLORIDE SERPL-SCNC: 104 MMOL/L (ref 98–107)
CO2 SERPL-SCNC: 22 MMOL/L (ref 23–31)
CREAT SERPL-MCNC: 1.08 MG/DL (ref 0.72–1.25)
CREAT/UREA NIT SERPL: 30
EOSINOPHIL # BLD AUTO: 0 X10(3)/MCL (ref 0–0.9)
EOSINOPHIL NFR BLD AUTO: 0 %
ERYTHROCYTE [DISTWIDTH] IN BLOOD BY AUTOMATED COUNT: 22 % (ref 11.5–17)
GFR SERPLBLD CREATININE-BSD FMLA CKD-EPI: >60 ML/MIN/1.73/M2
GLOBULIN SER-MCNC: 3.3 GM/DL (ref 2.4–3.5)
GLUCOSE SERPL-MCNC: 153 MG/DL (ref 82–115)
HCT VFR BLD AUTO: 29.8 % (ref 42–52)
HGB BLD-MCNC: 9.1 G/DL (ref 14–18)
IMM GRANULOCYTES # BLD AUTO: 0.03 X10(3)/MCL (ref 0–0.04)
IMM GRANULOCYTES NFR BLD AUTO: 0.4 %
LYMPHOCYTES # BLD AUTO: 0.31 X10(3)/MCL (ref 0.6–4.6)
LYMPHOCYTES NFR BLD AUTO: 3.7 %
MAGNESIUM SERPL-MCNC: 2.4 MG/DL (ref 1.6–2.6)
MCH RBC QN AUTO: 26.9 PG (ref 27–31)
MCHC RBC AUTO-ENTMCNC: 30.5 G/DL (ref 33–36)
MCV RBC AUTO: 88.2 FL (ref 80–94)
MONOCYTES # BLD AUTO: 0.45 X10(3)/MCL (ref 0.1–1.3)
MONOCYTES NFR BLD AUTO: 5.3 %
NEUTROPHILS # BLD AUTO: 7.69 X10(3)/MCL (ref 2.1–9.2)
NEUTROPHILS NFR BLD AUTO: 90.6 %
NRBC BLD AUTO-RTO: 0 %
OVALOCYTES (OLG): ABNORMAL
PHOSPHATE SERPL-MCNC: 2.8 MG/DL (ref 2.3–4.7)
PLATELET # BLD AUTO: 152 X10(3)/MCL (ref 130–400)
PLATELET # BLD EST: ADEQUATE 10*3/UL
PLATELETS.RETICULATED NFR BLD AUTO: 6.8 % (ref 0.9–11.2)
PMV BLD AUTO: 11.7 FL (ref 7.4–10.4)
POCT GLUCOSE: 157 MG/DL (ref 70–110)
POCT GLUCOSE: 180 MG/DL (ref 70–110)
POCT GLUCOSE: 197 MG/DL (ref 70–110)
POIKILOCYTOSIS BLD QL SMEAR: ABNORMAL
POTASSIUM SERPL-SCNC: 4.3 MMOL/L (ref 3.5–5.1)
PROT SERPL-MCNC: 7.3 GM/DL (ref 5.8–7.6)
RBC # BLD AUTO: 3.38 X10(6)/MCL (ref 4.7–6.1)
RBC MORPH BLD: ABNORMAL
SCHISTOCYTE (OLG): ABNORMAL
SODIUM SERPL-SCNC: 138 MMOL/L (ref 136–145)
WBC # BLD AUTO: 8.48 X10(3)/MCL (ref 4.5–11.5)

## 2024-12-07 PROCEDURE — 25000003 PHARM REV CODE 250

## 2024-12-07 PROCEDURE — 80053 COMPREHEN METABOLIC PANEL: CPT

## 2024-12-07 PROCEDURE — 36415 COLL VENOUS BLD VENIPUNCTURE: CPT

## 2024-12-07 PROCEDURE — 85025 COMPLETE CBC W/AUTO DIFF WBC: CPT

## 2024-12-07 PROCEDURE — 94761 N-INVAS EAR/PLS OXIMETRY MLT: CPT

## 2024-12-07 PROCEDURE — 27000221 HC OXYGEN, UP TO 24 HOURS

## 2024-12-07 PROCEDURE — 25000242 PHARM REV CODE 250 ALT 637 W/ HCPCS

## 2024-12-07 PROCEDURE — 83735 ASSAY OF MAGNESIUM: CPT

## 2024-12-07 PROCEDURE — 63600175 PHARM REV CODE 636 W HCPCS

## 2024-12-07 PROCEDURE — 84100 ASSAY OF PHOSPHORUS: CPT

## 2024-12-07 PROCEDURE — 94640 AIRWAY INHALATION TREATMENT: CPT

## 2024-12-07 PROCEDURE — 21400001 HC TELEMETRY ROOM

## 2024-12-07 PROCEDURE — 94760 N-INVAS EAR/PLS OXIMETRY 1: CPT

## 2024-12-07 PROCEDURE — 25000003 PHARM REV CODE 250: Performed by: EMERGENCY MEDICINE

## 2024-12-07 PROCEDURE — S4991 NICOTINE PATCH NONLEGEND: HCPCS | Performed by: EMERGENCY MEDICINE

## 2024-12-07 RX ORDER — IBUPROFEN 200 MG
24 TABLET ORAL
Status: DISCONTINUED | OUTPATIENT
Start: 2024-12-07 | End: 2024-12-09 | Stop reason: HOSPADM

## 2024-12-07 RX ORDER — IPRATROPIUM BROMIDE AND ALBUTEROL SULFATE 2.5; .5 MG/3ML; MG/3ML
3 SOLUTION RESPIRATORY (INHALATION)
Status: DISCONTINUED | OUTPATIENT
Start: 2024-12-07 | End: 2024-12-09 | Stop reason: HOSPADM

## 2024-12-07 RX ORDER — IBUPROFEN 200 MG
16 TABLET ORAL
Status: DISCONTINUED | OUTPATIENT
Start: 2024-12-07 | End: 2024-12-09 | Stop reason: HOSPADM

## 2024-12-07 RX ORDER — FUROSEMIDE 10 MG/ML
40 INJECTION INTRAMUSCULAR; INTRAVENOUS ONCE
Status: COMPLETED | OUTPATIENT
Start: 2024-12-07 | End: 2024-12-07

## 2024-12-07 RX ORDER — INSULIN ASPART 100 [IU]/ML
0-5 INJECTION, SOLUTION INTRAVENOUS; SUBCUTANEOUS
Status: DISCONTINUED | OUTPATIENT
Start: 2024-12-07 | End: 2024-12-09 | Stop reason: HOSPADM

## 2024-12-07 RX ORDER — GLUCAGON 1 MG
1 KIT INJECTION
Status: DISCONTINUED | OUTPATIENT
Start: 2024-12-07 | End: 2024-12-09 | Stop reason: HOSPADM

## 2024-12-07 RX ORDER — METOLAZONE 5 MG/1
10 TABLET ORAL DAILY
Status: DISCONTINUED | OUTPATIENT
Start: 2024-12-07 | End: 2024-12-09

## 2024-12-07 RX ADMIN — NITROFURANTOIN MONOHYDRATE/MACROCRYSTALS 100 MG: 75; 25 CAPSULE ORAL at 08:12

## 2024-12-07 RX ADMIN — MIDODRINE HYDROCHLORIDE 10 MG: 5 TABLET ORAL at 08:12

## 2024-12-07 RX ADMIN — RIVAROXABAN 20 MG: 10 TABLET, FILM COATED ORAL at 05:12

## 2024-12-07 RX ADMIN — FOLIC ACID 1 MG: 1 TABLET ORAL at 08:12

## 2024-12-07 RX ADMIN — ATORVASTATIN CALCIUM 80 MG: 40 TABLET, FILM COATED ORAL at 08:12

## 2024-12-07 RX ADMIN — MIDODRINE HYDROCHLORIDE 10 MG: 5 TABLET ORAL at 05:12

## 2024-12-07 RX ADMIN — METOLAZONE 10 MG: 5 TABLET ORAL at 10:12

## 2024-12-07 RX ADMIN — METHYLPREDNISOLONE SODIUM SUCCINATE 60 MG: 40 INJECTION, POWDER, FOR SOLUTION INTRAMUSCULAR; INTRAVENOUS at 12:12

## 2024-12-07 RX ADMIN — SACUBITRIL AND VALSARTAN 1 TABLET: 24; 26 TABLET, FILM COATED ORAL at 08:12

## 2024-12-07 RX ADMIN — PANTOPRAZOLE SODIUM 40 MG: 40 TABLET, DELAYED RELEASE ORAL at 09:12

## 2024-12-07 RX ADMIN — SACUBITRIL AND VALSARTAN 1 TABLET: 24; 26 TABLET, FILM COATED ORAL at 09:12

## 2024-12-07 RX ADMIN — IPRATROPIUM BROMIDE AND ALBUTEROL SULFATE 3 ML: .5; 3 SOLUTION RESPIRATORY (INHALATION) at 06:12

## 2024-12-07 RX ADMIN — IPRATROPIUM BROMIDE AND ALBUTEROL SULFATE 3 ML: .5; 3 SOLUTION RESPIRATORY (INHALATION) at 03:12

## 2024-12-07 RX ADMIN — ACETAMINOPHEN 650 MG: 325 TABLET, FILM COATED ORAL at 01:12

## 2024-12-07 RX ADMIN — PANTOPRAZOLE SODIUM 40 MG: 40 TABLET, DELAYED RELEASE ORAL at 08:12

## 2024-12-07 RX ADMIN — NICOTINE 1 PATCH: 14 PATCH, EXTENDED RELEASE TRANSDERMAL at 08:12

## 2024-12-07 RX ADMIN — IPRATROPIUM BROMIDE AND ALBUTEROL SULFATE 3 ML: .5; 3 SOLUTION RESPIRATORY (INHALATION) at 11:12

## 2024-12-07 RX ADMIN — IPRATROPIUM BROMIDE AND ALBUTEROL SULFATE 3 ML: .5; 3 SOLUTION RESPIRATORY (INHALATION) at 07:12

## 2024-12-07 RX ADMIN — CETIRIZINE HYDROCHLORIDE 10 MG: 10 TABLET, FILM COATED ORAL at 08:12

## 2024-12-07 RX ADMIN — METOPROLOL SUCCINATE 12.5 MG: 25 TABLET, EXTENDED RELEASE ORAL at 08:12

## 2024-12-07 RX ADMIN — FUROSEMIDE 40 MG: 10 INJECTION, SOLUTION INTRAMUSCULAR; INTRAVENOUS at 09:12

## 2024-12-07 RX ADMIN — CLOPIDOGREL BISULFATE 75 MG: 75 TABLET ORAL at 08:12

## 2024-12-07 RX ADMIN — NITROFURANTOIN MONOHYDRATE/MACROCRYSTALS 100 MG: 75; 25 CAPSULE ORAL at 09:12

## 2024-12-07 NOTE — PROGRESS NOTES
[facility] Medicine Wards   Progress Note      Resident Team: [facility] Family Medicine List 2  Attending Physician: Terry Boyd MD  Hospital Length of Stay: 2 days    Subjective   Brief HPI:  Ran Reyes Jr. is a 69 y.o. male with PMH significant for atrial fibrillation on Xarelto, CHF (EF 22% 8/2024), COPD on 2L home oxygen, CAD, HTN, mitral regurgitation, and PAD, who presented to [facility] ED on 12/5/2024  with a primary complaint of shortness of breath. Reports increasing shortness of breath with orthopnea and dyspnea on exertion for the past two days. He noticed an increase in his lower extremity swelling bilaterally. He also complains of tightness of his abdomen but denies abdominal pain or tenderness. He states that he ran out of Entresto recently and thus has not been taking it; reports adherence to all other medications. For his COPD, he denies being on any maintenance or rescue inhalers. Denies recent illness or sick contact. Denies change in diet and denies consuming an increase in salty foods. Denies chest pain, nausea, vomiting, constipation, diarrhea, or difficulty with urination. Patient was having increasing difficulty with breathing so he called 911. He was given a Duoneb treatment en route to the ED.     Patient also reports feeling weak and dizzy after getting out of his vehicle yesterday at [employer]. States that he normally has to wait a few seconds after standing to be sure that he is steady on his feet. He got out of his car quickly and felt dizzy while walking into the store. States that bystanders helped him as he fell but he hit the back of his head on a post. Denies loss of consciousness. Denies headache and was able to walk unassisted after the fall.      ED Course - Upon arrival, patient afebrile T 96.8 F, /96, HR 97, RR 22, SpO2 98% on 2L NC. Workup significant for normocytic anemia H/H 9.4/30.2 (baseline around 12/38), creatinine 1.45 (baseline 1.3), total bilirubin 1.8, BNP 1908  (2531.8 3 months ago). UA with trace protein, 1+ blood, 500 leukocyte esterase, 11-20 RBC, >100 WBC, trace bacteria, 3-5 hyaline casts. Troponin and CK WNL. CXR performed showing prominent interstitial markings. EKG performed showing atrial fibrillation with PVCs, rate 77 bpm. He was given aspirin 325 mg and Lasix 40 mg IV in the ED. Internal medicine was consulted for further management    Interval History: KAIDEN VAZQUEZ. Had 2.9L output since yesterday with one dose of Lasix 40 mg IV. This morning, states that shortness of breath is similar to on admission. Denies chest pain, abdominal pain, nausea, vomiting, dysuria.        Objective     Vital Signs (Most Recent):  Temp: 98 °F (36.7 °C) (12/07/24 0744)  Pulse: 83 (12/07/24 0744)  Resp: 18 (12/07/24 0733)  BP: 105/71 (12/07/24 0744)  SpO2: 95 % (12/07/24 0744) Vital Signs (24h Range):  Temp:  [97.4 °F (36.3 °C)-98 °F (36.7 °C)] 98 °F (36.7 °C)  Pulse:  [62-91] 83  Resp:  [18-20] 18  SpO2:  [94 %-100 %] 95 %  BP: ()/(61-72) 105/71     Physical Examination:  Physical Exam  Constitutional:       General: He is not in acute distress.     Appearance: He is not ill-appearing.      Comments: Sitting up in chair next to bed   HENT:      Head: Normocephalic and atraumatic.      Mouth/Throat:      Pharynx: Oropharynx is clear.   Neck:      Vascular: JVD present.   Cardiovascular:      Rate and Rhythm: Normal rate. Rhythm irregular.      Pulses: Normal pulses.      Heart sounds: Normal heart sounds. No murmur heard.  Pulmonary:      Effort: Pulmonary effort is normal.      Breath sounds: Wheezing (expiratory wheezes throughout lung fields) and rales (Bibasilar rales) present. No rhonchi.      Comments: NC in place, increased pauses for breath between speaking  Abdominal:      General: Abdomen is protuberant. Bowel sounds are normal.      Palpations: Abdomen is soft.      Tenderness: There is no abdominal tenderness. There is no guarding or rebound.   Musculoskeletal:       Right lower leg: 3+ Pitting Edema present.      Left lower leg: 3+ Pitting Edema present.   Skin:     General: Skin is warm and dry.   Neurological:      Mental Status: He is alert.         Laboratory:  Most Recent Data:  CBC:   Recent Labs   Lab 12/05/24 0923 12/06/24 0328 12/07/24  0335   WBC 5.45 4.58 8.48   HGB 9.4* 9.2* 9.1*   HCT 30.2* 29.0* 29.8*    150 152     CMP:   Recent Labs   Lab 12/07/24  0335   CALCIUM 9.3   ALBUMIN 4.0      K 4.3   CO2 22*      BUN 32.9*   CREATININE 1.08   ALKPHOS 104   ALT 11   AST 17   BILITOT 1.3       Radiology:  Imaging Results              X-Ray Chest AP Portable (Final result)  Result time 12/05/24 10:01:58      Final result by Marino Frazier MD (12/05/24 10:01:58)                   Impression:      No acute cardiopulmonary process.  Findings of chronic cardiopulmonary disease.      Electronically signed by: Marino Frazier  Date:    12/05/2024  Time:    10:01               Narrative:    EXAMINATION:  XR CHEST AP PORTABLE    CLINICAL HISTORY:  Dyspnea, unspecified    TECHNIQUE:  Single view of the chest    COMPARISON:  No prior imaging available for comparison.    FINDINGS:  Prominent interstitial markings with no focal opacification.    No pleural effusion or pneumothorax.    The cardiomediastinal silhouette remains enlarged.    No acute osseous abnormality.                                      Current Medications:     Infusions:       Scheduled:   albuterol-ipratropium  3 mL Nebulization Q6H WAKE    atorvastatin  80 mg Oral Daily    cetirizine  10 mg Oral Daily    clopidogreL  75 mg Oral Daily    folic acid  1 mg Oral Daily    methylPREDNISolone injection (PEDS and ADULTS)  60 mg Intravenous Q12H    metoprolol succinate  12.5 mg Oral Daily    midodrine  10 mg Oral BID WM    nicotine  1 patch Transdermal Daily    nitrofurantoin (macrocrystal-monohydrate)  100 mg Oral Q12H    pantoprazole  40 mg Oral BID    rivaroxaban  20 mg Oral Daily     sacubitriL-valsartan  1 tablet Oral BID        PRN:    Current Facility-Administered Medications:     acetaminophen, 650 mg, Oral, Q4H PRN    sodium chloride 0.9%, 10 mL, Intravenous, PRN      Intake/Output Summary (Last 24 hours) at 12/7/2024 0846  Last data filed at 12/7/2024 0635  Gross per 24 hour   Intake 800 ml   Output 2275 ml   Net -1475 ml       Lines/Drains/Airways       Peripheral Intravenous Line  Duration                  Peripheral IV - Single Lumen 12/05/24 0846 18 G Left;Posterior Hand 2 days                    Assessment and Plan    Acute CHF exacerbation  - TTE 8/14/24 EF 22%, PASP 51 mmHg, elevated venous pressure 15 mmHg  - Repeat TTE 12/5/24: EF 30%, PASP 44 mmHg, venous pressure 8 mmHg  - S/p Lasix 40 mg IV in ED  - On Lasix 40 mg PO BID at home  - Strict I&Os, daily weights  - Fluid restriction, low sodium diet  - Continue home Entresto and metoprolol succinate 12.5 mg daily  - Holding home spironolactone  - COVID/Flu/RSV negative  - Giving Lasix 40 IV today and monitoring output  - given metolazone 10mg to help with diuresis     Shortness of breath  COPD  - S/p Duoneb x 1 en route to ED without improvement in shortness of breath or wheezing  - Audibly expiratory wheezes and diffuse expiratory wheezes on auscultation  - Scheduled Duonebs q4h  - On home 2L NC, goal saturations 88-92%  - Continue Solumedrol 60 mg IV q12h    Hypokalemia  - potassium wnl  - Holding home Kcl, repleting as needed     Acute cystitis with hematuria  - UA with trace protein, 1+ blood, 500 leukocyte esterase, 11-20 RBC, >100 WBC, trace bacteria, 3-5 hyaline casts  - Urine culture from August with E. Coli ESBL sensitive to Macrobid  - Conitnue Macrobid 100 mg q12h     Recent Fall  - Fall 1 day prior to hospization, hit posterior skull on a pole. Denies LOC and was able to ambulate unassisted after  - Likely a result of orthostatic hypotension  - No neuro deficits appreciated on exam  - No spinal or paraspinal muscle  tenderness of cervical, thoracic, or lumbar spine  - Ct head negative for acute intracranial process     Normocytic anemia  - H/H 9.4/30.2, MCV 88. Previously 12.4/38.5 3 months ago  - On home folate supplementation  - Iron studies suggestive of NANDO. From chart review, has history of NANDO and underwent EGD 3/22/24: reflux esophagitis without bleeding, chronic gastritis, chronic duodenitis     Atrial fibrillation, rate controlled  - Continue home Xarelto, ASA, Plavix, metoprolol  - On cardiac monitoring     Hypotension  - Normotensive to mildly hypertensive in ED  - Continue home midodrine 10 mg TIDWM     History of AAA  - Last CT abd/pelvis 4/26/24: 3.8 cm saccular abdominal aneurysm measuring 3.8 cm  - AAA screening US performed 1/2023. Consider follow up outpatient  - If increase in pain or hemodynamic instability, can consider inpatient     GERD  - Continue home Protonix     HLD  - Continue home Lipitor        CODE STATUS: Full  Access: pIV  Antibiotics: None  Diet: Heart Healthy  DVT Prophylaxis: Xarelto  GI Prophylaxis: Protonix  Fluids: None      Disposition: 69 year old male admitted for CHF exacerbation and management of shortness of breath. Scheduled duonebs q4h and diuresing. Discharge pending course.         Anushka Clarke DO  [facility] Resident, -II

## 2024-12-08 LAB
ALBUMIN SERPL-MCNC: 3.8 G/DL (ref 3.4–4.8)
ALBUMIN/GLOB SERPL: 1.2 RATIO (ref 1.1–2)
ALP SERPL-CCNC: 97 UNIT/L (ref 40–150)
ALT SERPL-CCNC: 10 UNIT/L (ref 0–55)
ANION GAP SERPL CALC-SCNC: 11 MEQ/L
AST SERPL-CCNC: 14 UNIT/L (ref 5–34)
BACTERIA #/AREA URNS AUTO: ABNORMAL /HPF
BASOPHILS # BLD AUTO: 0 X10(3)/MCL
BASOPHILS NFR BLD AUTO: 0 %
BILIRUB SERPL-MCNC: 1.3 MG/DL
BILIRUB UR QL STRIP.AUTO: NEGATIVE
BUN SERPL-MCNC: 32.5 MG/DL (ref 8.4–25.7)
CALCIUM SERPL-MCNC: 9.1 MG/DL (ref 8.8–10)
CHLORIDE SERPL-SCNC: 102 MMOL/L (ref 98–107)
CLARITY UR: CLEAR
CO2 SERPL-SCNC: 25 MMOL/L (ref 23–31)
COLOR UR AUTO: COLORLESS
CREAT SERPL-MCNC: 1.11 MG/DL (ref 0.72–1.25)
CREAT/UREA NIT SERPL: 29
EOSINOPHIL # BLD AUTO: 0 X10(3)/MCL (ref 0–0.9)
EOSINOPHIL NFR BLD AUTO: 0 %
ERYTHROCYTE [DISTWIDTH] IN BLOOD BY AUTOMATED COUNT: 22 % (ref 11.5–17)
GFR SERPLBLD CREATININE-BSD FMLA CKD-EPI: >60 ML/MIN/1.73/M2
GLOBULIN SER-MCNC: 3.2 GM/DL (ref 2.4–3.5)
GLUCOSE SERPL-MCNC: 147 MG/DL (ref 82–115)
GLUCOSE UR QL STRIP: NORMAL
HCT VFR BLD AUTO: 29.5 % (ref 42–52)
HGB BLD-MCNC: 9.3 G/DL (ref 14–18)
HGB UR QL STRIP: NEGATIVE
HYALINE CASTS #/AREA URNS LPF: ABNORMAL /LPF
IMM GRANULOCYTES # BLD AUTO: 0.04 X10(3)/MCL (ref 0–0.04)
IMM GRANULOCYTES NFR BLD AUTO: 0.5 %
KETONES UR QL STRIP: NEGATIVE
LEUKOCYTE ESTERASE UR QL STRIP: 250
LYMPHOCYTES # BLD AUTO: 0.3 X10(3)/MCL (ref 0.6–4.6)
LYMPHOCYTES NFR BLD AUTO: 3.5 %
MAGNESIUM SERPL-MCNC: 2.4 MG/DL (ref 1.6–2.6)
MCH RBC QN AUTO: 27.7 PG (ref 27–31)
MCHC RBC AUTO-ENTMCNC: 31.5 G/DL (ref 33–36)
MCV RBC AUTO: 87.8 FL (ref 80–94)
MONOCYTES # BLD AUTO: 0.44 X10(3)/MCL (ref 0.1–1.3)
MONOCYTES NFR BLD AUTO: 5.2 %
NEUTROPHILS # BLD AUTO: 7.68 X10(3)/MCL (ref 2.1–9.2)
NEUTROPHILS NFR BLD AUTO: 90.8 %
NITRITE UR QL STRIP: NEGATIVE
NRBC BLD AUTO-RTO: 0 %
PH UR STRIP: 6 [PH]
PHOSPHATE SERPL-MCNC: 3.2 MG/DL (ref 2.3–4.7)
PLATELET # BLD AUTO: 176 X10(3)/MCL (ref 130–400)
PMV BLD AUTO: 11.4 FL (ref 7.4–10.4)
POCT GLUCOSE: 209 MG/DL (ref 70–110)
POCT GLUCOSE: 239 MG/DL (ref 70–110)
POTASSIUM SERPL-SCNC: 4 MMOL/L (ref 3.5–5.1)
PROT SERPL-MCNC: 7 GM/DL (ref 5.8–7.6)
PROT UR QL STRIP: NEGATIVE
RBC # BLD AUTO: 3.36 X10(6)/MCL (ref 4.7–6.1)
RBC #/AREA URNS AUTO: ABNORMAL /HPF
SODIUM SERPL-SCNC: 138 MMOL/L (ref 136–145)
SP GR UR STRIP.AUTO: 1.01 (ref 1–1.03)
SQUAMOUS #/AREA URNS LPF: ABNORMAL /HPF
UROBILINOGEN UR STRIP-ACNC: NORMAL
WBC # BLD AUTO: 8.46 X10(3)/MCL (ref 4.5–11.5)
WBC #/AREA URNS AUTO: ABNORMAL /HPF

## 2024-12-08 PROCEDURE — 21400001 HC TELEMETRY ROOM

## 2024-12-08 PROCEDURE — 97116 GAIT TRAINING THERAPY: CPT

## 2024-12-08 PROCEDURE — 27000221 HC OXYGEN, UP TO 24 HOURS

## 2024-12-08 PROCEDURE — 80053 COMPREHEN METABOLIC PANEL: CPT

## 2024-12-08 PROCEDURE — 36415 COLL VENOUS BLD VENIPUNCTURE: CPT

## 2024-12-08 PROCEDURE — 25000003 PHARM REV CODE 250

## 2024-12-08 PROCEDURE — 85025 COMPLETE CBC W/AUTO DIFF WBC: CPT

## 2024-12-08 PROCEDURE — 25000242 PHARM REV CODE 250 ALT 637 W/ HCPCS

## 2024-12-08 PROCEDURE — 25000003 PHARM REV CODE 250: Performed by: EMERGENCY MEDICINE

## 2024-12-08 PROCEDURE — 97530 THERAPEUTIC ACTIVITIES: CPT

## 2024-12-08 PROCEDURE — 81001 URINALYSIS AUTO W/SCOPE: CPT

## 2024-12-08 PROCEDURE — S4991 NICOTINE PATCH NONLEGEND: HCPCS | Performed by: EMERGENCY MEDICINE

## 2024-12-08 PROCEDURE — 83735 ASSAY OF MAGNESIUM: CPT

## 2024-12-08 PROCEDURE — 84100 ASSAY OF PHOSPHORUS: CPT

## 2024-12-08 PROCEDURE — 63600175 PHARM REV CODE 636 W HCPCS

## 2024-12-08 PROCEDURE — 94761 N-INVAS EAR/PLS OXIMETRY MLT: CPT

## 2024-12-08 PROCEDURE — 94640 AIRWAY INHALATION TREATMENT: CPT

## 2024-12-08 PROCEDURE — 94760 N-INVAS EAR/PLS OXIMETRY 1: CPT

## 2024-12-08 RX ORDER — FUROSEMIDE 10 MG/ML
40 INJECTION INTRAMUSCULAR; INTRAVENOUS ONCE
Status: COMPLETED | OUTPATIENT
Start: 2024-12-08 | End: 2024-12-08

## 2024-12-08 RX ORDER — IPRATROPIUM BROMIDE AND ALBUTEROL SULFATE 2.5; .5 MG/3ML; MG/3ML
3 SOLUTION RESPIRATORY (INHALATION) ONCE
Status: COMPLETED | OUTPATIENT
Start: 2024-12-08 | End: 2024-12-08

## 2024-12-08 RX ORDER — BENZONATATE 100 MG/1
200 CAPSULE ORAL 3 TIMES DAILY PRN
Status: DISCONTINUED | OUTPATIENT
Start: 2024-12-08 | End: 2024-12-09 | Stop reason: HOSPADM

## 2024-12-08 RX ADMIN — PANTOPRAZOLE SODIUM 40 MG: 40 TABLET, DELAYED RELEASE ORAL at 08:12

## 2024-12-08 RX ADMIN — INSULIN ASPART 1 UNITS: 100 INJECTION, SOLUTION INTRAVENOUS; SUBCUTANEOUS at 10:12

## 2024-12-08 RX ADMIN — IPRATROPIUM BROMIDE AND ALBUTEROL SULFATE 3 ML: .5; 3 SOLUTION RESPIRATORY (INHALATION) at 07:12

## 2024-12-08 RX ADMIN — METOPROLOL SUCCINATE 12.5 MG: 25 TABLET, EXTENDED RELEASE ORAL at 08:12

## 2024-12-08 RX ADMIN — ATORVASTATIN CALCIUM 80 MG: 40 TABLET, FILM COATED ORAL at 08:12

## 2024-12-08 RX ADMIN — CETIRIZINE HYDROCHLORIDE 10 MG: 10 TABLET, FILM COATED ORAL at 08:12

## 2024-12-08 RX ADMIN — SACUBITRIL AND VALSARTAN 1 TABLET: 24; 26 TABLET, FILM COATED ORAL at 08:12

## 2024-12-08 RX ADMIN — IPRATROPIUM BROMIDE AND ALBUTEROL SULFATE 3 ML: .5; 3 SOLUTION RESPIRATORY (INHALATION) at 03:12

## 2024-12-08 RX ADMIN — MIDODRINE HYDROCHLORIDE 10 MG: 5 TABLET ORAL at 08:12

## 2024-12-08 RX ADMIN — IPRATROPIUM BROMIDE AND ALBUTEROL SULFATE 3 ML: .5; 3 SOLUTION RESPIRATORY (INHALATION) at 08:12

## 2024-12-08 RX ADMIN — RIVAROXABAN 20 MG: 10 TABLET, FILM COATED ORAL at 04:12

## 2024-12-08 RX ADMIN — BENZONATATE 200 MG: 100 CAPSULE ORAL at 04:12

## 2024-12-08 RX ADMIN — IPRATROPIUM BROMIDE AND ALBUTEROL SULFATE 3 ML: .5; 3 SOLUTION RESPIRATORY (INHALATION) at 12:12

## 2024-12-08 RX ADMIN — METOLAZONE 10 MG: 5 TABLET ORAL at 08:12

## 2024-12-08 RX ADMIN — FOLIC ACID 1 MG: 1 TABLET ORAL at 08:12

## 2024-12-08 RX ADMIN — INSULIN ASPART 2 UNITS: 100 INJECTION, SOLUTION INTRAVENOUS; SUBCUTANEOUS at 12:12

## 2024-12-08 RX ADMIN — METHYLPREDNISOLONE SODIUM SUCCINATE 60 MG: 40 INJECTION, POWDER, FOR SOLUTION INTRAMUSCULAR; INTRAVENOUS at 12:12

## 2024-12-08 RX ADMIN — MIDODRINE HYDROCHLORIDE 10 MG: 5 TABLET ORAL at 04:12

## 2024-12-08 RX ADMIN — FUROSEMIDE 40 MG: 10 INJECTION, SOLUTION INTRAMUSCULAR; INTRAVENOUS at 08:12

## 2024-12-08 RX ADMIN — NICOTINE 1 PATCH: 14 PATCH, EXTENDED RELEASE TRANSDERMAL at 08:12

## 2024-12-08 RX ADMIN — CLOPIDOGREL BISULFATE 75 MG: 75 TABLET ORAL at 08:12

## 2024-12-08 RX ADMIN — IPRATROPIUM BROMIDE AND ALBUTEROL SULFATE 3 ML: .5; 3 SOLUTION RESPIRATORY (INHALATION) at 11:12

## 2024-12-08 NOTE — PROGRESS NOTES
[facility]   Progress Note      Resident Team: [facility] Family Medicine List 2  Attending Physician: Terry Boyd MD  Hospital Length of Stay: 3 days    Subjective   Brief HPI:  Ran Reyes Jr. is a 69 y.o. male with PMH significant for atrial fibrillation on Xarelto, CHF (EF 22% 8/2024), COPD on 2L home oxygen, CAD, HTN, mitral regurgitation, and PAD, who presented to [facility] ED on 12/5/2024  with a primary complaint of shortness of breath. Reports increasing shortness of breath with orthopnea and dyspnea on exertion for the past two days. He noticed an increase in his lower extremity swelling bilaterally. He also complains of tightness of his abdomen but denies abdominal pain or tenderness. He states that he ran out of Entresto recently and thus has not been taking it; reports adherence to all other medications. For his COPD, he denies being on any maintenance or rescue inhalers. Denies recent illness or sick contact. Denies change in diet and denies consuming an increase in salty foods. Denies chest pain, nausea, vomiting, constipation, diarrhea, or difficulty with urination. Patient was having increasing difficulty with breathing so he called 911. He was given a Duoneb treatment en route to the ED.     Patient also reports feeling weak and dizzy after getting out of his vehicle yesterday at [employer]. States that he normally has to wait a few seconds after standing to be sure that he is steady on his feet. He got out of his car quickly and felt dizzy while walking into the store. States that bystanders helped him as he fell but he hit the back of his head on a post. Denies loss of consciousness. Denies headache and was able to walk unassisted after the fall.      ED Course - Upon arrival, patient afebrile T 96.8 F, /96, HR 97, RR 22, SpO2 98% on 2L NC. Workup significant for normocytic anemia H/H 9.4/30.2 (baseline around 12/38), creatinine 1.45 (baseline 1.3), total bilirubin 1.8, BNP 1908  (2531.8 3 months ago). UA with trace protein, 1+ blood, 500 leukocyte esterase, 11-20 RBC, >100 WBC, trace bacteria, 3-5 hyaline casts. Troponin and CK WNL. CXR performed showing prominent interstitial markings. EKG performed showing atrial fibrillation with PVCs, rate 77 bpm. He was given aspirin 325 mg and Lasix 40 mg IV in the ED. Internal medicine was consulted for further management    Interval History: KAIDEN VAZQUEZ. Had 2.9L output since yesterday with one dose of Lasix 40 mg IV and metolazone. This morning, states improvement in swelling and breathing, states he felt his 7 am breathing treatment did not help much and is complaining of a cough. Denies chest pain, abdominal pain, nausea, vomiting, dysuria.        Objective     Vital Signs (Most Recent):  Temp: 98 °F (36.7 °C) (24 0745)  Pulse: 78 (24 0809)  Resp: 19 (24 0809)  BP: 109/73 (24 0745)  SpO2: 97 % (24 0745) Vital Signs (24h Range):  Temp:  [97.6 °F (36.4 °C)-98.3 °F (36.8 °C)] 98 °F (36.7 °C)  Pulse:  [69-97] 78  Resp:  [16-22] 19  SpO2:  [95 %-100 %] 97 %  BP: ()/(50-77) 109/73     Physical Examination:  Physical Exam  Constitutional:       General: He is not in acute distress.     Comments: Resting in bed comfortably   HENT:      Head: Normocephalic and atraumatic.      Mouth/Throat:      Pharynx: Oropharynx is clear.   Neck:      Vascular: No JVD.   Cardiovascular:      Rate and Rhythm: Normal rate. Rhythm irregular.      Pulses: Normal pulses.      Heart sounds: Normal heart sounds. No murmur heard.  Pulmonary:      Effort: Pulmonary effort is normal.      Breath sounds: Wheezing (course expiratory wheezes) present. No rhonchi or rales.      Comments: NC in place  Abdominal:      General: Abdomen is protuberant. Bowel sounds are normal.      Palpations: Abdomen is soft.      Tenderness: There is no abdominal tenderness. There is no guarding or rebound.   Musculoskeletal:      Right lower le+ Pitting Edema  present.      Left lower le+ Pitting Edema present.   Skin:     General: Skin is warm and dry.   Neurological:      General: No focal deficit present.      Mental Status: He is alert and oriented to person, place, and time.         Laboratory:  Most Recent Data:  CBC:   Recent Labs   Lab 24  0335 24  033   WBC 8.48 8.46   HGB 9.1* 9.3*   HCT 29.8* 29.5*    176     CMP:   Recent Labs   Lab 24   CALCIUM 9.1   ALBUMIN 3.8      K 4.0   CO2 25      BUN 32.5*   CREATININE 1.11   ALKPHOS 97   ALT 10   AST 14   BILITOT 1.3       Radiology:  Imaging Results              X-Ray Chest AP Portable (Final result)  Result time 24 10:01:58      Final result by Marino Frazier MD (24 10:01:58)                   Impression:      No acute cardiopulmonary process.  Findings of chronic cardiopulmonary disease.      Electronically signed by: Marino Frazier  Date:    2024  Time:    10:01               Narrative:    EXAMINATION:  XR CHEST AP PORTABLE    CLINICAL HISTORY:  Dyspnea, unspecified    TECHNIQUE:  Single view of the chest    COMPARISON:  No prior imaging available for comparison.    FINDINGS:  Prominent interstitial markings with no focal opacification.    No pleural effusion or pneumothorax.    The cardiomediastinal silhouette remains enlarged.    No acute osseous abnormality.                                      Current Medications:     Infusions:       Scheduled:   albuterol-ipratropium  3 mL Nebulization Q4H WAKE    atorvastatin  80 mg Oral Daily    cetirizine  10 mg Oral Daily    clopidogreL  75 mg Oral Daily    folic acid  1 mg Oral Daily    methylPREDNISolone injection (PEDS and ADULTS)  60 mg Intravenous Q12H    metOLazone  10 mg Oral Daily    metoprolol succinate  12.5 mg Oral Daily    midodrine  10 mg Oral BID WM    nicotine  1 patch Transdermal Daily    pantoprazole  40 mg Oral BID    rivaroxaban  20 mg Oral Daily    sacubitriL-valsartan  1 tablet  Oral BID        PRN:    Current Facility-Administered Medications:     acetaminophen, 650 mg, Oral, Q4H PRN    benzonatate, 200 mg, Oral, TID PRN    dextrose 10%, 12.5 g, Intravenous, PRN    dextrose 10%, 25 g, Intravenous, PRN    glucagon (human recombinant), 1 mg, Intramuscular, PRN    glucose, 16 g, Oral, PRN    glucose, 24 g, Oral, PRN    insulin aspart U-100, 0-5 Units, Subcutaneous, QID (AC + HS) PRN    sodium chloride 0.9%, 10 mL, Intravenous, PRN      Intake/Output Summary (Last 24 hours) at 12/8/2024 0814  Last data filed at 12/8/2024 0634  Gross per 24 hour   Intake 1750 ml   Output 3275 ml   Net -1525 ml       Lines/Drains/Airways       Peripheral Intravenous Line  Duration                  Peripheral IV - Single Lumen 12/05/24 0846 18 G Left;Posterior Hand 2 days                    Assessment and Plan    Acute CHF exacerbation  - TTE 8/14/24 EF 22%, PASP 51 mmHg, elevated venous pressure 15 mmHg  - Repeat TTE 12/5/24: EF 30%, PASP 44 mmHg, venous pressure 8 mmHg  - S/p Lasix 40 mg IV in ED  - On Lasix 40 mg PO BID at home  - Strict I&Os, daily weights  - Fluid restriction, low sodium diet  - Continue home Entresto and metoprolol succinate 12.5 mg daily  - Holding home spironolactone  - COVID/Flu/RSV negative  - Giving Lasix 40 IV today and monitoring output    Shortness of breath  COPD  - S/p Duoneb x 1 en route to ED without improvement in shortness of breath or wheezing  - Audibly expiratory wheezes and diffuse expiratory wheezes on auscultation  - Scheduled Duonebs q4h  - On home 2L NC, goal saturations 88-92%  - Continue Solumedrol 60 mg IV q12h  - tessalon pearls for cough   - can add mucinex if mucus thicken or become bbother some    Hypokalemia  - potassium wnl  - Holding home Kcl, repleting as needed     Acute cystitis with hematuria  - UA with trace protein, 1+ blood, 500 leukocyte esterase, 11-20 RBC, >100 WBC, trace bacteria, 3-5 hyaline casts  - Urine culture from August with E. Coli ESBL  sensitive to Macrobid  - discontinue Macrobid 100 mg q12h   - repeat UA and urine culture today     Recent Fall  - Fall 1 day prior to hospization, hit posterior skull on a pole. Denies LOC and was able to ambulate unassisted after  - Likely a result of orthostatic hypotension  - No neuro deficits appreciated on exam  - No spinal or paraspinal muscle tenderness of cervical, thoracic, or lumbar spine  - Ct head negative for acute intracranial process     Normocytic anemia  - H/H 9.4/30.2, MCV 88. Previously 12.4/38.5 3 months ago  - On home folate supplementation  - Iron studies suggestive of NANDO. From chart review, has history of NANDO and underwent EGD 3/22/24: reflux esophagitis without bleeding, chronic gastritis, chronic duodenitis     Atrial fibrillation, rate controlled  - Continue home Xarelto, ASA, Plavix, metoprolol  - On cardiac monitoring     Hypotension  - Normotensive to mildly hypertensive in ED  - Continue home midodrine 10 mg TIDWM     History of AAA  - Last CT abd/pelvis 4/26/24: 3.8 cm saccular abdominal aneurysm measuring 3.8 cm  - AAA screening US performed 1/2023. Consider follow up outpatient  - If increase in pain or hemodynamic instability, can consider inpatient     GERD  - Continue home Protonix     HLD  - Continue home Lipitor        CODE STATUS: Full  Access: pIV  Antibiotics: None  Diet: Heart Healthy  DVT Prophylaxis: Xarelto  GI Prophylaxis: Protonix  Fluids: None      Disposition: 69 year old male admitted for CHF exacerbation and management of shortness of breath. Scheduled duonebs q4h and diuresing. Discharge pending course.         Anushka Clarke DO  [facility] Resident, \[facility]\""II

## 2024-12-08 NOTE — PT/OT/SLP PROGRESS
Physical Therapy Treatment    Patient Name:  Ran Reyes Jr.   MRN:  [id number]    Recommendations     Therapy Intensity Recommendations at Discharge: Low Intensity Therapy  Discharge Equipment Recommendations: shower chair   Barriers to discharge: fall risk, decreased endurance, and decreased safety awareness    Assessment     Ran Reyes Jr. is a 69 y.o. male admitted with a medical diagnosis of CHF exacerbation.  1. Wheezing    2. SOB (shortness of breath)    3. Dyspnea    4. Screening due    5. CHF (congestive heart failure)    6. Acute on chronic congestive heart failure, unspecified heart failure type    7. History of COPD    8. PAD (peripheral artery disease)       Patient Active Problem List   Diagnosis    Primary open angle glaucoma (POAG) of right eye, moderate stage    Combined forms of age-related cataract of left eye    Arteriosclerosis of coronary artery    Chronic atrial fibrillation    Dyslipidemia    Hypertension    Tobacco use    PVD (peripheral vascular disease)    VHD (valvular heart disease)    COVID-19    HFrEF (heart failure with reduced ejection fraction)    Nonrheumatic mitral valve regurgitation    Postoperative eye state    Scrotal hematoma    Chronic obstructive pulmonary disease, unspecified    Lesion of external ear    Low back pain    Other thrombophilia    Secondary hyperaldosteronism    Positive colorectal cancer screening using Cologuard test    Acute heart failure    History of COPD    CHF exacerbation    Wheezing    Acute cystitis with hematuria      He presents with the following impairments/functional limitations:  weakness, impaired endurance, impaired self care skills, impaired functional mobility, pain.    Rehab Prognosis: Good.    Patient would benefit from continued skilled acute PT services to: address above listed impairments/functional limitations; receive patient/caregiver education; reduce fall risk; and maximize independency/safety with functional  mobility.    Recent Surgery: * No surgery found *      Plan     During this hospitalization, patient to be seen 5 x/week to address the identified impairments/functional limitations via gait training, therapeutic activities, therapeutic exercises and progress toward the established goals.    Plan of Care Expires:  01/04/25    Subjective     Communicated with patient's nurse Morgan prior to session.    Patient agreeable to participate in treatment session.    Chief Complaint: Bilateral LE pain after ambulation.  Patient/Family Comments/goals: Patient wants to get better and go home.   Pain/Comfort:  Pain Rating 1: 0/10  Location - Side 1: Bilateral  Location 1: leg  Pain Addressed 1: Reposition, Cessation of Activity, Nurse notified  Pain Rating Post-Intervention 1: 10/10    Objective     Patient found sitting in chair with peripheral IV, oxygen, telemetry  upon PT entry to room.    General Precautions: Standard, fall   Orthopedic Precautions:N/A   Braces:  N/A  Respiratory Status: 2 liters/min O2 via nasal cannula    Functional Mobility:    Transfers:  Sit <=> stand t/f's x4 w/  SBA and VC    Gait:  230' x2 w/ CGA, VC and 2L O² in tow; no AD (patient did c/o SOB and bilateral LE pain)  Patient demonstrates :       steady gait       no loss of balance.        Patient left sitting in chair with all lines intact, call button in reach, and patient's nurse notified.    Education     Patient educated on and assisted with functional mobility as noted above.  Patient educated on PT Plan of Care.  Patient was instructed to utilize staff assistance for mobility/transfers.  White board updated regarding patient's safest level of mobility with staff assistance    Goals     Multidisciplinary Problems       Physical Therapy Goals          Problem: Physical Therapy    Goal Priority Disciplines Outcome Interventions   Physical Therapy Goal     PT, PT/OT Progressing    Description: Goals to be met by: Discharge    Pt. Will increase  independence with functional mobility by performin. Supine<>sit with [place].  2. Sit<>stand with MOD I.  3. Pt. Will ambulate x 260 feet with RW and MOD I.                     Time Tracking     PT Received On: 24  PT Start Time: 1125     PT Stop Time: 1155  PT Total Time (min): 30 min     Billable Minutes: Gait Training 18 and Therapeutic Activity 12    2024

## 2024-12-08 NOTE — PLAN OF CARE
Problem: Adult Inpatient Plan of Care  Goal: Plan of Care Review  Outcome: Progressing  Goal: Patient-Specific Goal (Individualized)  Outcome: Progressing  Goal: Absence of Hospital-Acquired Illness or Injury  Outcome: Progressing  Goal: Optimal Comfort and Wellbeing  Outcome: Progressing  Goal: Readiness for Transition of Care  Outcome: Progressing     Problem: Wound  Goal: Optimal Coping  Outcome: Progressing  Goal: Optimal Functional Ability  Outcome: Progressing  Goal: Absence of Infection Signs and Symptoms  Outcome: Progressing  Goal: Improved Oral Intake  Outcome: Progressing  Goal: Optimal Pain Control and Function  Outcome: Progressing  Goal: Skin Health and Integrity  Outcome: Progressing  Goal: Optimal Wound Healing  Outcome: Progressing     Problem: Heart Failure  Goal: Optimal Coping  Outcome: Progressing  Goal: Optimal Cardiac Output  Outcome: Progressing  Goal: Stable Heart Rate and Rhythm  Outcome: Progressing  Goal: Optimal Functional Ability  Outcome: Progressing  Goal: Fluid and Electrolyte Balance  Outcome: Progressing  Goal: Improved Oral Intake  Outcome: Progressing  Goal: Effective Breathing Pattern During Sleep  Outcome: Progressing     Problem: Fall Injury Risk  Goal: Absence of Fall and Fall-Related Injury  Outcome: Progressing     Problem: COPD (Chronic Obstructive Pulmonary Disease)  Goal: Optimal Chronic Illness Coping  Outcome: Progressing  Goal: Optimal Level of Functional [place]  Outcome: Progressing  Goal: Absence of Infection Signs and Symptoms  Outcome: Progressing  Goal: Improved Oral Intake  Outcome: Progressing  Goal: Effective Oxygenation and Ventilation  Outcome: Progressing

## 2024-12-09 VITALS
RESPIRATION RATE: 18 BRPM | HEIGHT: 72 IN | DIASTOLIC BLOOD PRESSURE: 72 MMHG | HEART RATE: 76 BPM | SYSTOLIC BLOOD PRESSURE: 110 MMHG | WEIGHT: 233.69 LBS | OXYGEN SATURATION: 98 % | TEMPERATURE: 98 F | BODY MASS INDEX: 31.65 KG/M2

## 2024-12-09 PROBLEM — R06.2 WHEEZING: Status: RESOLVED | Noted: 2024-12-05 | Resolved: 2024-12-09

## 2024-12-09 LAB
ALBUMIN SERPL-MCNC: 3.9 G/DL (ref 3.4–4.8)
ALBUMIN/GLOB SERPL: 1.2 RATIO (ref 1.1–2)
ALP SERPL-CCNC: 96 UNIT/L (ref 40–150)
ALT SERPL-CCNC: 13 UNIT/L (ref 0–55)
ANION GAP SERPL CALC-SCNC: 11 MEQ/L
AST SERPL-CCNC: 15 UNIT/L (ref 5–34)
BASOPHILS # BLD AUTO: 0 X10(3)/MCL
BASOPHILS NFR BLD AUTO: 0 %
BILIRUB SERPL-MCNC: 1.2 MG/DL
BUN SERPL-MCNC: 37.9 MG/DL (ref 8.4–25.7)
CALCIUM SERPL-MCNC: 9.1 MG/DL (ref 8.8–10)
CHLORIDE SERPL-SCNC: 97 MMOL/L (ref 98–107)
CO2 SERPL-SCNC: 27 MMOL/L (ref 23–31)
CREAT SERPL-MCNC: 1.24 MG/DL (ref 0.72–1.25)
CREAT/UREA NIT SERPL: 31
EOSINOPHIL # BLD AUTO: 0 X10(3)/MCL (ref 0–0.9)
EOSINOPHIL NFR BLD AUTO: 0 %
ERYTHROCYTE [DISTWIDTH] IN BLOOD BY AUTOMATED COUNT: 21.8 % (ref 11.5–17)
GFR SERPLBLD CREATININE-BSD FMLA CKD-EPI: >60 ML/MIN/1.73/M2
GLOBULIN SER-MCNC: 3.3 GM/DL (ref 2.4–3.5)
GLUCOSE SERPL-MCNC: 141 MG/DL (ref 82–115)
HCT VFR BLD AUTO: 31.6 % (ref 42–52)
HGB BLD-MCNC: 10.1 G/DL (ref 14–18)
IMM GRANULOCYTES # BLD AUTO: 0.04 X10(3)/MCL (ref 0–0.04)
IMM GRANULOCYTES NFR BLD AUTO: 0.5 %
LEFT CFA PSV: 109 CM/S
LEFT DORSALIS PEDIS PSV: 51 CM/S
LEFT POPLITEAL PSV: 108 CM/S
LEFT POST TIBIAL SYS PSV: 85 CM/S
LEFT PROFUNDA SYS PSV: 97 CM/S
LEFT SUPER FEMORAL DIST SYS PSV: 93 CM/S
LEFT SUPER FEMORAL MID SYS PSV: 103 CM/S
LEFT SUPER FEMORAL PROX SYS PSV: 93 CM/S
LYMPHOCYTES # BLD AUTO: 0.42 X10(3)/MCL (ref 0.6–4.6)
LYMPHOCYTES NFR BLD AUTO: 5.1 %
MAGNESIUM SERPL-MCNC: 2.4 MG/DL (ref 1.6–2.6)
MCH RBC QN AUTO: 28 PG (ref 27–31)
MCHC RBC AUTO-ENTMCNC: 32 G/DL (ref 33–36)
MCV RBC AUTO: 87.5 FL (ref 80–94)
MONOCYTES # BLD AUTO: 0.65 X10(3)/MCL (ref 0.1–1.3)
MONOCYTES NFR BLD AUTO: 7.9 %
NEUTROPHILS # BLD AUTO: 7.09 X10(3)/MCL (ref 2.1–9.2)
NEUTROPHILS NFR BLD AUTO: 86.5 %
NRBC BLD AUTO-RTO: 0 %
PHOSPHATE SERPL-MCNC: 3.5 MG/DL (ref 2.3–4.7)
PLATELET # BLD AUTO: 187 X10(3)/MCL (ref 130–400)
PMV BLD AUTO: 10.7 FL (ref 7.4–10.4)
POCT GLUCOSE: 155 MG/DL (ref 70–110)
POTASSIUM SERPL-SCNC: 4.3 MMOL/L (ref 3.5–5.1)
PROT SERPL-MCNC: 7.2 GM/DL (ref 5.8–7.6)
RBC # BLD AUTO: 3.61 X10(6)/MCL (ref 4.7–6.1)
RIGHT CFA PSV: 68 CM/S
RIGHT DORSALIS PEDIS PSV: 7 CM/S
RIGHT POPLITEAL PSV: 78 CM/S
RIGHT POST TIBIAL SYS PSV: 58 CM/S
RIGHT PROFUNDA SYS PSV: 76 CM/S
RIGHT SUPER FEMORAL DIST SYS PSV: 76 CM/S
RIGHT SUPER FEMORAL MID SYS PSV: 166 CM/S
RIGHT SUPER FEMORAL PROX SYS PSV: 94 CM/S
SODIUM SERPL-SCNC: 135 MMOL/L (ref 136–145)
WBC # BLD AUTO: 8.2 X10(3)/MCL (ref 4.5–11.5)

## 2024-12-09 PROCEDURE — 36415 COLL VENOUS BLD VENIPUNCTURE: CPT

## 2024-12-09 PROCEDURE — 25000003 PHARM REV CODE 250

## 2024-12-09 PROCEDURE — 97116 GAIT TRAINING THERAPY: CPT

## 2024-12-09 PROCEDURE — 80053 COMPREHEN METABOLIC PANEL: CPT

## 2024-12-09 PROCEDURE — 25000003 PHARM REV CODE 250: Performed by: EMERGENCY MEDICINE

## 2024-12-09 PROCEDURE — 94760 N-INVAS EAR/PLS OXIMETRY 1: CPT

## 2024-12-09 PROCEDURE — 25000242 PHARM REV CODE 250 ALT 637 W/ HCPCS

## 2024-12-09 PROCEDURE — 63600175 PHARM REV CODE 636 W HCPCS

## 2024-12-09 PROCEDURE — 85025 COMPLETE CBC W/AUTO DIFF WBC: CPT

## 2024-12-09 PROCEDURE — 83735 ASSAY OF MAGNESIUM: CPT

## 2024-12-09 PROCEDURE — 84100 ASSAY OF PHOSPHORUS: CPT

## 2024-12-09 PROCEDURE — 94640 AIRWAY INHALATION TREATMENT: CPT

## 2024-12-09 PROCEDURE — S4991 NICOTINE PATCH NONLEGEND: HCPCS | Performed by: EMERGENCY MEDICINE

## 2024-12-09 PROCEDURE — 94761 N-INVAS EAR/PLS OXIMETRY MLT: CPT

## 2024-12-09 PROCEDURE — 27000221 HC OXYGEN, UP TO 24 HOURS

## 2024-12-09 RX ORDER — MIDODRINE HYDROCHLORIDE 10 MG/1
10 TABLET ORAL 2 TIMES DAILY WITH MEALS
Qty: 60 TABLET | Refills: 0 | Status: ON HOLD | OUTPATIENT
Start: 2024-12-09 | End: 2024-12-20 | Stop reason: HOSPADM

## 2024-12-09 RX ORDER — NITROFURANTOIN 25; 75 MG/1; MG/1
100 CAPSULE ORAL 2 TIMES DAILY
Qty: 14 CAPSULE | Refills: 0 | Status: ON HOLD | OUTPATIENT
Start: 2024-12-09 | End: 2024-12-20 | Stop reason: HOSPADM

## 2024-12-09 RX ORDER — IPRATROPIUM BROMIDE AND ALBUTEROL SULFATE 2.5; .5 MG/3ML; MG/3ML
3 SOLUTION RESPIRATORY (INHALATION) EVERY 6 HOURS PRN
Qty: 360 ML | Refills: 0 | Status: SHIPPED | OUTPATIENT
Start: 2024-12-09

## 2024-12-09 RX ORDER — SACUBITRIL AND VALSARTAN 24; 26 MG/1; MG/1
1 TABLET, FILM COATED ORAL 2 TIMES DAILY
Qty: 60 TABLET | Refills: 2 | Status: SHIPPED | OUTPATIENT
Start: 2024-12-09

## 2024-12-09 RX ORDER — PREDNISONE 20 MG/1
40 TABLET ORAL DAILY
Qty: 2 TABLET | Refills: 0 | Status: ON HOLD | OUTPATIENT
Start: 2024-12-10 | End: 2024-12-20 | Stop reason: HOSPADM

## 2024-12-09 RX ADMIN — ACETAMINOPHEN 650 MG: 325 TABLET, FILM COATED ORAL at 03:12

## 2024-12-09 RX ADMIN — PANTOPRAZOLE SODIUM 40 MG: 40 TABLET, DELAYED RELEASE ORAL at 09:12

## 2024-12-09 RX ADMIN — IPRATROPIUM BROMIDE AND ALBUTEROL SULFATE 3 ML: .5; 3 SOLUTION RESPIRATORY (INHALATION) at 07:12

## 2024-12-09 RX ADMIN — MIDODRINE HYDROCHLORIDE 10 MG: 5 TABLET ORAL at 09:12

## 2024-12-09 RX ADMIN — SACUBITRIL AND VALSARTAN 1 TABLET: 24; 26 TABLET, FILM COATED ORAL at 09:12

## 2024-12-09 RX ADMIN — CLOPIDOGREL BISULFATE 75 MG: 75 TABLET ORAL at 09:12

## 2024-12-09 RX ADMIN — NICOTINE 1 PATCH: 14 PATCH, EXTENDED RELEASE TRANSDERMAL at 09:12

## 2024-12-09 RX ADMIN — ATORVASTATIN CALCIUM 80 MG: 40 TABLET, FILM COATED ORAL at 09:12

## 2024-12-09 RX ADMIN — IPRATROPIUM BROMIDE AND ALBUTEROL SULFATE 3 ML: .5; 3 SOLUTION RESPIRATORY (INHALATION) at 03:12

## 2024-12-09 RX ADMIN — METOPROLOL SUCCINATE 12.5 MG: 25 TABLET, EXTENDED RELEASE ORAL at 09:12

## 2024-12-09 RX ADMIN — METHYLPREDNISOLONE SODIUM SUCCINATE 60 MG: 40 INJECTION, POWDER, FOR SOLUTION INTRAMUSCULAR; INTRAVENOUS at 01:12

## 2024-12-09 RX ADMIN — CETIRIZINE HYDROCHLORIDE 10 MG: 10 TABLET, FILM COATED ORAL at 09:12

## 2024-12-09 RX ADMIN — FOLIC ACID 1 MG: 1 TABLET ORAL at 09:12

## 2024-12-09 RX ADMIN — IPRATROPIUM BROMIDE AND ALBUTEROL SULFATE 3 ML: .5; 3 SOLUTION RESPIRATORY (INHALATION) at 11:12

## 2024-12-09 NOTE — DISCHARGE SUMMARY
[facility] Discharge Summary    Admitting Physician: Terry Boyd MD  Attending Physician: Terry Boyd MD  Date of Admit: 12/5/2024  Date of Discharge: 12/9/2024    Condition: Stable  Outcome: Patient tolerated treatment/procedure well without complication and is now ready for discharge.  DISPOSITION: Home or Self Care    Discharge Diagnoses     Principal Problem:  CHF exacerbation  Active Hospital Problems    Diagnosis  POA    *CHF exacerbation [I50.9]  Yes    Acute cystitis with hematuria [N30.01]  Yes      Resolved Hospital Problems    Diagnosis Date Resolved POA    Wheezing [R06.2] 12/09/2024 Yes       Patient Active Problem List   Diagnosis    Primary open angle glaucoma (POAG) of right eye, moderate stage    Combined forms of age-related cataract of left eye    Arteriosclerosis of coronary artery    Chronic atrial fibrillation    Dyslipidemia    Hypertension    Tobacco use    PVD (peripheral vascular disease)    VHD (valvular heart disease)    COVID-19    HFrEF (heart failure with reduced ejection fraction)    Nonrheumatic mitral valve regurgitation    Postoperative eye state    Scrotal hematoma    Chronic obstructive pulmonary disease, unspecified    Lesion of external ear    Low back pain    Other thrombophilia    Secondary hyperaldosteronism    Positive colorectal cancer screening using Cologuard test    Acute heart failure    History of COPD    CHF exacerbation    Acute cystitis with hematuria       Consultants and Procedures     Consultants:  IP CONSULT TO HOSPITAL MEDICINE  IP CONSULT TO REGISTERED DIETITIAN/NUTRITIONIST  IP CONSULT TO SOCIAL WORK/CASE MANAGEMENT    Procedures:   * No surgery found *     Brief Admission History      Ran Reyes Jr. is a 69 y.o. male with PMH significant for atrial fibrillation on Xarelto, CHF (EF 22% 8/2024), COPD on 2L home oxygen, CAD, HTN, mitral regurgitation, and PAD, who presented to [facility] ED on 12/5/2024  with a primary complaint of  shortness of breath. Reports increasing shortness of breath with orthopnea and dyspnea on exertion for the past two days. He noticed an increase in his lower extremity swelling bilaterally. He also complains of tightness of his abdomen but denies abdominal pain or tenderness. He states that he ran out of Entresto recently and thus has not been taking it; reports adherence to all other medications. For his COPD, he denies being on any maintenance or rescue inhalers. Denies recent illness or sick contact. Denies change in diet and denies consuming an increase in salty foods. Denies chest pain, nausea, vomiting, constipation, diarrhea, or difficulty with urination. Patient was having increasing difficulty with breathing so he called 911. He was given a Duoneb treatment en route to the ED.     Patient also reports feeling weak and dizzy after getting out of his vehicle yesterday at [employer]. States that he normally has to wait a few seconds after standing to be sure that he is steady on his feet. He got out of his car quickly and felt dizzy while walking into the store. States that bystanders helped him as he fell but he hit the back of his head on a post. Denies loss of consciousness. Denies headache and was able to walk unassisted after the fall.      ED Course - Upon arrival, patient afebrile T 96.8 F, /96, HR 97, RR 22, SpO2 98% on 2L NC. Workup significant for normocytic anemia H/H 9.4/30.2 (baseline around 12/38), creatinine 1.45 (baseline 1.3), total bilirubin 1.8, BNP 1908 (2531.8 3 months ago). UA with trace protein, 1+ blood, 500 leukocyte esterase, 11-20 RBC, >100 WBC, trace bacteria, 3-5 hyaline casts. Troponin and CK WNL. CXR performed showing prominent interstitial markings. EKG performed showing atrial fibrillation with PVCs, rate 77 bpm. He was given aspirin 325 mg and Lasix 40 mg IV in the ED. Internal medicine was consulted for further management    Hospital Course with Pertinent Findings    Patient was admitted for management CHF exacerbation and shortness of breath. He completed Duonebs q4h overnight with significant improvement in subjective shortness of breath and wheezing on auscultation. He has completed 4 days on Solumedrol 60 mg BID during admission. He was diuresed with Lasix with a total output of -6,815 L and significant improvement in lower extremity and abdominal edema. His home midodrine was tapered from three times daily to twice daily on 12/6. He was started on Macrobid for UTI. He remained afebrile and hemodynamically stable for the duration of admission. On day of discharge, patient is afebrile, no longer wheezing and has diuresed to well. He is now stable for discharge.     Patient must complete course of macrobid bid x 7 days. He will continue po prednisone x 1 day to complete 5 day course. He has been instructed to decrease the midorine from BID to once and then 3 days later discontinue as his blood pressure tolerates. Instructed to check blood pressure twice daily at home and bring records to hospital follow up visit. Entresto dose lowered to 24-26mg. ED precaution given for hypotension.      Discharge physical exam:  Vitals  BP: 110/72  Temp: 97.7 °F (36.5 °C)  Temp Source: Oral  Pulse: 85  Resp: 19  SpO2: 98 %  Height: 6' (182.9 cm)  Weight: 106 kg (233 lb 11 oz)    Physical Exam  Vitals reviewed.   Constitutional:       General: He is not in acute distress.  HENT:      Mouth/Throat:      Mouth: Mucous membranes are moist.   Eyes:      Conjunctiva/sclera: Conjunctivae normal.      Pupils: Pupils are equal, round, and reactive to light.   Neck:      Vascular: No JVD.   Cardiovascular:      Rate and Rhythm: Normal rate and regular rhythm.      Pulses: Normal pulses.      Heart sounds: Normal heart sounds. No murmur heard.  Pulmonary:      Effort: Pulmonary effort is normal. No respiratory distress.      Breath sounds: Normal breath sounds. No wheezing or rales.      Comments: NC  in place  Abdominal:      General: There is no distension.      Palpations: Abdomen is soft.      Tenderness: There is no abdominal tenderness.   Musculoskeletal:         General: No tenderness.      Right lower le+ Edema present.      Left lower le+ Edema present.   Skin:     General: Skin is warm and dry.      Capillary Refill: Capillary refill takes less than 2 seconds.   Neurological:      General: No focal deficit present.      Mental Status: He is alert.          TIME SPENT ON DISCHARGE: 60 minutes    Discharge Medications        Medication List        START taking these medications      nitrofurantoin (macrocrystal-monohydrate) 100 MG capsule  Commonly known as: MACROBID  Take 1 capsule (100 mg total) by mouth 2 (two) times daily. for 7 days     predniSONE 20 MG tablet  Commonly known as: DELTASONE  Take 2 tablets (40 mg total) by mouth once daily.  Start taking on: December 10, 2024     sacubitriL-valsartan 24-26 mg per tablet  Commonly known as: ENTRESTO  Take 1 tablet by mouth 2 (two) times daily.  Replaces: ENTRESTO 49-51 mg per tablet            CHANGE how you take these medications      midodrine 10 MG tablet  Commonly known as: PROAMATINE  Take 1 tablet (10 mg total) by mouth 2 (two) times daily with meals.  What changed: when to take this            CONTINUE taking these medications      albuterol-ipratropium 2.5 mg-0.5 mg/3 mL nebulizer solution  Commonly known as: DUO-NEB  Take 3 mLs by nebulization every 6 (six) hours as needed for Wheezing. Rescue     aspirin 81 MG EC tablet  Commonly known as: ECOTRIN  Take 1 tablet (81 mg total) by mouth once daily.     atorvastatin 80 MG tablet  Commonly known as: LIPITOR  Take 1 tablet (80 mg total) by mouth once daily.     cetirizine 10 MG tablet  Commonly known as: ZYRTEC  Take 1 tablet (10 mg total) by mouth once daily.     clopidogreL 75 mg tablet  Commonly known as: PLAVIX     folic acid 1 MG tablet  Commonly known as: FOLVITE  Take 1 tablet (1 mg  total) by mouth once daily.     furosemide 40 MG tablet  Commonly known as: LASIX  Take 1 tablet (40 mg total) by mouth 2 (two) times a day.     pantoprazole 40 MG tablet  Commonly known as: PROTONIX  Take 1 tablet (40 mg total) by mouth 2 (two) times a day.     potassium chloride SA 20 MEQ tablet  Commonly known as: K-DUR,KLOR-CON  Take 1 tablet (20 mEq total) by mouth 2 (two) times daily.     rivaroxaban 20 mg Tab  Commonly known as: XARELTO  Take 1 tablet (20 mg total) by mouth Daily.     spironolactone 50 MG tablet  Commonly known as: ALDACTONE  Take 1 tablet (50 mg total) by mouth once daily.     triamcinolone acetonide 0.1% 0.1 % cream  Commonly known as: KENALOG            STOP taking these medications      ENTRESTO 49-51 mg per tablet  Generic drug: sacubitriL-valsartan  Replaced by: sacubitriL-valsartan 24-26 mg per tablet            ASK your doctor about these medications      hydrOXYzine HCL 10 MG Tab  Commonly known as: ATARAX     metoprolol succinate 25 MG 24 hr tablet  Commonly known as: TOPROL-XL  Take 0.5 tablets (12.5 mg total) by mouth once daily.     varenicline 0.5 mg (11)- 1 mg (42) tablet  Commonly known as: CHANTIX STARTING MONTH BOX  Take one 0.5mg tab by mouth once daily X3 days,then increase to one 0.5mg tab twice daily X4 days,then increase to one 1mg tab twice daily               Where to Get Your Medications        These medications were sent to [address] [address]      Phone: [phone]   midodrine 10 MG tablet  nitrofurantoin (macrocrystal-monohydrate) 100 MG capsule  predniSONE 20 MG tablet  sacubitriL-valsartan 24-26 mg per tablet         Discharge Information:     Complete all medications as prescribed.     ED precautions discussed including but not limited to dizziness, confusion, vision changes, shortness of breath, chest pian, severe LE edema     Maintain the following  appointments:   Contact information for follow-up providers       Abiodun Recinos DO. Schedule an appointment as soon as possible for a visit.    Specialty: Internal Medicine  Contact information:  [facility] [address]  [phone]                       Contact information for after-discharge care       Home Medical Care       [facility] .    Service: Home Health Services  Contact information:  [address] [facility] [address]  [phone]                                   Anushka Clarke DO  [facility] Family Medicine Resident, [facility]

## 2024-12-09 NOTE — PROGRESS NOTES
Messaged md about the esbl result and the pt not having any antibiotics.  RINE CULTURE  12/5/2024 10:02 AM  Escherichia coli ESBL    Antibiotic Interpretation  Value   Ampicillin/Sulbactam Sensitive  4   Ertapenem Sensitive  <=0.12   Gentamicin Sensitive  <=1   Meropenem Sensitive  <=0.25   Nitrofurantoin Sensitive  <=16   Piperacillin/Tazobactam Sensitive  <=4      PHYSICAL THERAPY EVALUATION - INPATIENT     Room Number: 6491/7131-L  Evaluation Date: 9/9/2018  Type of Evaluation: Re-evaluation  Physician Order: PT Eval and Treat(re-evaluation)    Presenting Problem: cough, recent CVA  Reason for Therapy: Mobili fall risk    WEIGHT BEARING RESTRICTION  Weight Bearing Restriction: None                PAIN ASSESSMENT  Rating: Unable to rate          COGNITION  · Overall Cognitive Status:  Impaired and difficult to assess secondary to aphasia  · Following Commands: CM    FUNCTIONAL ABILITY STATUS  Gait Assessment   Gait Assistance: Not tested  Distance (ft): 0                Skilled Therapy Provided: Pt lying in bed and agreed to PT. Pt's other son present in the room on this date.  As well as granddaughter and grands discharge. Pt diagnosed with presumed aspiration. Pertinent comorbidities and personal factors impacting therapy include [facility], dysphagia, pulmonary fibrosis, anemia, RA.     In this PT re-evaluation, the patient presents with Visits to Meet Established Goals: 5      CURRENT GOALS    Goal #1 Pt's family will be independent with pt's HEP including PROM RLE, AAROM LLE, AAROM neck     Goal #2 Pt's family will be independent with midline positioning of patient in bed.      Goal #3 Pt

## 2024-12-09 NOTE — PROGRESS NOTES
Discharge Summary submitted to [facility] via [facility] fax.

## 2024-12-09 NOTE — PLAN OF CARE
12/09/24 1014   Medicare Message   Important Message from Medicare regarding Discharge Appeal Rights Given to patient/caregiver;Explained to patient/caregiver;Signed/date by patient/caregiver   Date IMM was signed 12/09/24   Time IMM was signed 1014

## 2024-12-09 NOTE — PT/OT/SLP PROGRESS
Physical Therapy Treatment    Patient Name:  Ran Reyes Jr.   MRN:  [id number]    Recommendations     Therapy Intensity Recommendations at Discharge: Low Intensity Therapy  Discharge Equipment Recommendations: shower chair   Barriers to discharge: decreased endurance    Assessment     Ran Reyes Jr. is a 69 y.o. male admitted with a medical diagnosis of  CHF exacerbation.  1. Wheezing    2. SOB (shortness of breath)    3. Dyspnea    4. Screening due    5. CHF (congestive heart failure)    6. Acute on chronic congestive heart failure, unspecified heart failure type    7. History of COPD    8. PAD (peripheral artery disease)    9. Tobacco use       Patient Active Problem List   Diagnosis    Primary open angle glaucoma (POAG) of right eye, moderate stage    Combined forms of age-related cataract of left eye    Arteriosclerosis of coronary artery    Chronic atrial fibrillation    Dyslipidemia    Hypertension    Tobacco use    PVD (peripheral vascular disease)    VHD (valvular heart disease)    COVID-19    HFrEF (heart failure with reduced ejection fraction)    Nonrheumatic mitral valve regurgitation    Postoperative eye state    Scrotal hematoma    Chronic obstructive pulmonary disease, unspecified    Lesion of external ear    Low back pain    Other thrombophilia    Secondary hyperaldosteronism    Positive colorectal cancer screening using Cologuard test    Acute heart failure    History of COPD    CHF exacerbation    Acute cystitis with hematuria      He presents with the following impairments/functional limitations:  weakness, impaired endurance, impaired self care skills, impaired functional mobility, gait instability, impaired balance.    Rehab Prognosis: Good.    Patient would benefit from continued skilled acute PT services to: address above listed impairments/functional limitations; receive patient/caregiver education; reduce fall risk; and maximize independency/safety with functional  mobility.    Recent Surgery: * No surgery found *      Plan     During this hospitalization, patient to be seen 5 x/week to address the identified impairments/functional limitations via gait training, therapeutic activities, therapeutic exercises and progress toward the established goals.    Plan of Care Expires:  01/04/25    Subjective     Communicated with patient's nurse  prior to session.    Patient agreeable to participate in treatment session.    Chief Complaint: pain  Patient/Family Comments/goals: to go home  Pain/Comfort:  Pain Rating 1: 0/10  Location - Orientation 1: posterior  Location 1: back  Pain Addressed 1: Nurse notified  Pain Rating Post-Intervention 1: 5/10    Objective     Patient found sitting in chair with peripheral IV, oxygen, telemetry  upon PT entry to room.    General Precautions: Standard, fall   Orthopedic Precautions:N/A   Braces:  N/A  Respiratory Status: room air    Functional Mobility:    Bed Mobility:  Seated in chair at start of session and left in chair at end of session    Transfers:  Sit to Stand: supervision with rolling walker  Stand to Sit: supervision with rolling walker    Gait:  Patient ambulated 260ft with rolling walker and stand by assistance and contact guard assistance.  Patient demonstrates :       no loss of balance       occasional unsteady gait       decreased susie       decreased left step length       decreased left foot/floor clearance.    Other Mobility:  N/A    Patient left sitting in chair with all lines intact, call button in reach, tray table at bedside, and patient's nurse notified.    Education     Patient educated on and assisted with functional mobility as noted above.  Patient educated on PT Plan of Care.  Patient was instructed to utilize staff assistance for mobility/transfers.  White board updated regarding patient's safest level of mobility with staff assistance    Goals     Multidisciplinary Problems       Physical Therapy Goals           Problem: Physical Therapy    Goal Priority Disciplines Outcome Interventions   Physical Therapy Goal     PT, PT/OT Progressing    Description: Goals to be met by: Discharge    Pt. Will increase independence with functional mobility by performin. Supine<>sit with [employer].  2. Sit<>stand with MOD I.  3. Pt. Will ambulate x 260 feet with RW and MOD I.                     Time Tracking     PT Received On: 24  PT Start Time: 1035     PT Stop Time: 1045  PT Total Time (min): 10 min     Billable Minutes: Gait Training 10 mins    2024

## 2024-12-09 NOTE — PLAN OF CARE
Problem: Adult Inpatient Plan of Care  Goal: Plan of Care Review  Outcome: Progressing  Goal: Patient-Specific Goal (Individualized)  Outcome: Progressing  Goal: Absence of Hospital-Acquired Illness or Injury  Outcome: Progressing  Goal: Optimal Comfort and Wellbeing  Outcome: Progressing  Goal: Readiness for Transition of Care  Outcome: Progressing     Problem: Wound  Goal: Optimal Coping  Outcome: Progressing  Goal: Optimal Functional Ability  Outcome: Progressing  Goal: Absence of Infection Signs and Symptoms  Outcome: Progressing  Goal: Improved Oral Intake  Outcome: Progressing  Goal: Optimal Pain Control and Function  Outcome: Progressing  Goal: Skin Health and Integrity  Outcome: Progressing  Goal: Optimal Wound Healing  Outcome: Progressing     Problem: Heart Failure  Goal: Optimal Coping  Outcome: Progressing  Goal: Optimal Cardiac Output  Outcome: Progressing  Goal: Stable Heart Rate and Rhythm  Outcome: Progressing  Goal: Optimal Functional Ability  Outcome: Progressing  Goal: Fluid and Electrolyte Balance  Outcome: Progressing  Goal: Improved Oral Intake  Outcome: Progressing  Goal: Effective Breathing Pattern During Sleep  Outcome: Progressing     Problem: Fall Injury Risk  Goal: Absence of Fall and Fall-Related Injury  Outcome: Progressing     Problem: COPD (Chronic Obstructive Pulmonary Disease)  Goal: Optimal Chronic Illness Coping  Outcome: Progressing  Goal: Optimal Level of Functional [place]  Outcome: Progressing  Goal: Absence of Infection Signs and Symptoms  Outcome: Progressing  Goal: Improved Oral Intake  Outcome: Progressing  Goal: Effective Oxygenation and Ventilation  Outcome: Progressing

## 2024-12-10 ENCOUNTER — PATIENT OUTREACH (OUTPATIENT)
Dept: ADMINISTRATIVE | Facility: CLINIC | Age: 69
End: 2024-12-10
Payer: MEDICARE

## 2024-12-10 NOTE — PROGRESS NOTES
"C3 nurse attempted to contact patient for a TCC post hospital discharge follow-up call. The patient declined call at this time, as he was unwilling/unable to review medications and did not want to "answer a bunch of questions".  "

## 2024-12-10 NOTE — PT/OT/SLP DISCHARGE
POST DISCHARGE DOCUMENTATION - 12/10/2024 1:52 PM    Physical Therapy Discharge Summary    Name: Ran Reyes Jr.  MRN: [id number]   Principal Problem: CHF exacerbation   1. Wheezing    2. SOB (shortness of breath)    3. Dyspnea    4. Screening due    5. CHF (congestive heart failure)    6. Acute on chronic congestive heart failure, unspecified heart failure type    7. History of COPD    8. PAD (peripheral artery disease)    9. Tobacco use       Patient Active Problem List   Diagnosis    Primary open angle glaucoma (POAG) of right eye, moderate stage    Combined forms of age-related cataract of left eye    Arteriosclerosis of coronary artery    Chronic atrial fibrillation    Dyslipidemia    Hypertension    Tobacco use    PVD (peripheral vascular disease)    VHD (valvular heart disease)    COVID-19    HFrEF (heart failure with reduced ejection fraction)    Nonrheumatic mitral valve regurgitation    Postoperative eye state    Scrotal hematoma    Chronic obstructive pulmonary disease, unspecified    Lesion of external ear    Low back pain    Other thrombophilia    Secondary hyperaldosteronism    Positive colorectal cancer screening using Cologuard test    Acute heart failure    History of COPD    CHF exacerbation    Acute cystitis with hematuria      Recommendations - per last treatment session     Therapy Intensity Recommendations at Discharge: Low Intensity Therapy  Discharge Equipment Recommendations: shower chair     Assessment:     Patient last seen by PT SERVICES on 2024    Patient was unexpectedly discharged from hospital.    Objective     GOALS:  Multidisciplinary Problems       Physical Therapy Goals          Problem: Physical Therapy    Goal Priority Disciplines Outcome Interventions   Physical Therapy Goal     PT, PT/OT Progressing    Description: Goals to be met by: Discharge    Pt. Will increase independence with functional mobility by performin. Supine<>sit with [place].  2. Sit<>stand  with MOD I.  3. Pt. Will ambulate x 260 feet with RW and MOD I.                     Plan     Patient Discharged to: home or self care per chart.    12/10/2024

## 2024-12-16 ENCOUNTER — HOSPITAL ENCOUNTER (INPATIENT)
Facility: HOSPITAL | Age: 69
LOS: 4 days | Discharge: HOME HEALTH | DRG: 291 | End: 2024-12-20
Attending: EMERGENCY MEDICINE | Admitting: INTERNAL MEDICINE
Payer: MEDICARE

## 2024-12-16 ENCOUNTER — TELEPHONE (OUTPATIENT)
Dept: INTERNAL MEDICINE | Facility: CLINIC | Age: 69
End: 2024-12-16
Payer: MEDICARE

## 2024-12-16 DIAGNOSIS — I50.9 ACUTE EXACERBATION OF CHF (CONGESTIVE HEART FAILURE): ICD-10-CM

## 2024-12-16 DIAGNOSIS — I50.9 CONGESTIVE HEART FAILURE, UNSPECIFIED HF CHRONICITY, UNSPECIFIED HEART FAILURE TYPE: ICD-10-CM

## 2024-12-16 DIAGNOSIS — J44.9 CHRONIC OBSTRUCTIVE PULMONARY DISEASE, UNSPECIFIED COPD TYPE: ICD-10-CM

## 2024-12-16 DIAGNOSIS — E87.70 HYPERVOLEMIA, UNSPECIFIED HYPERVOLEMIA TYPE: ICD-10-CM

## 2024-12-16 DIAGNOSIS — J44.1 COPD EXACERBATION: Primary | ICD-10-CM

## 2024-12-16 DIAGNOSIS — Z87.09 HISTORY OF COPD: ICD-10-CM

## 2024-12-16 DIAGNOSIS — I50.9 ACUTE ON CHRONIC CONGESTIVE HEART FAILURE, UNSPECIFIED HEART FAILURE TYPE: ICD-10-CM

## 2024-12-16 DIAGNOSIS — R06.02 SHORTNESS OF BREATH: ICD-10-CM

## 2024-12-16 LAB
ALBUMIN SERPL-MCNC: 4 G/DL (ref 3.4–4.8)
ALBUMIN/GLOB SERPL: 1.2 RATIO (ref 1.1–2)
ALP SERPL-CCNC: 120 UNIT/L (ref 40–150)
ALT SERPL-CCNC: 20 UNIT/L (ref 0–55)
ANION GAP SERPL CALC-SCNC: 12 MEQ/L
AST SERPL-CCNC: 30 UNIT/L (ref 5–34)
BACTERIA #/AREA URNS AUTO: ABNORMAL /HPF
BASOPHILS # BLD AUTO: 0.01 X10(3)/MCL
BASOPHILS NFR BLD AUTO: 0.2 %
BILIRUB SERPL-MCNC: 1.3 MG/DL
BILIRUB UR QL STRIP.AUTO: NEGATIVE
BNP BLD-MCNC: 1343.9 PG/ML
BUN SERPL-MCNC: 24 MG/DL (ref 8.4–25.7)
CALCIUM SERPL-MCNC: 9.3 MG/DL (ref 8.8–10)
CHLORIDE SERPL-SCNC: 105 MMOL/L (ref 98–107)
CLARITY UR: CLEAR
CO2 SERPL-SCNC: 24 MMOL/L (ref 23–31)
COLOR UR AUTO: COLORLESS
CREAT SERPL-MCNC: 1.12 MG/DL (ref 0.72–1.25)
CREAT/UREA NIT SERPL: 21
EOSINOPHIL # BLD AUTO: 0.08 X10(3)/MCL (ref 0–0.9)
EOSINOPHIL NFR BLD AUTO: 1.3 %
ERYTHROCYTE [DISTWIDTH] IN BLOOD BY AUTOMATED COUNT: 20.7 % (ref 11.5–17)
FERRITIN SERPL-MCNC: 37.54 NG/ML (ref 21.81–274.66)
FLUAV AG UPPER RESP QL IA.RAPID: NOT DETECTED
FLUBV AG UPPER RESP QL IA.RAPID: NOT DETECTED
GFR SERPLBLD CREATININE-BSD FMLA CKD-EPI: >60 ML/MIN/1.73/M2
GLOBULIN SER-MCNC: 3.3 GM/DL (ref 2.4–3.5)
GLUCOSE SERPL-MCNC: 93 MG/DL (ref 82–115)
GLUCOSE UR QL STRIP: NORMAL
HCT VFR BLD AUTO: 31.4 % (ref 42–52)
HGB BLD-MCNC: 9.9 G/DL (ref 14–18)
HGB UR QL STRIP: NEGATIVE
HOLD SPECIMEN: NORMAL
HYALINE CASTS #/AREA URNS LPF: ABNORMAL /LPF
IMM GRANULOCYTES # BLD AUTO: 0.02 X10(3)/MCL (ref 0–0.04)
IMM GRANULOCYTES NFR BLD AUTO: 0.3 %
IRON SATN MFR SERPL: 16 % (ref 20–50)
IRON SERPL-MCNC: 67 UG/DL (ref 65–175)
KETONES UR QL STRIP: NEGATIVE
LEUKOCYTE ESTERASE UR QL STRIP: 25
LYMPHOCYTES # BLD AUTO: 1.08 X10(3)/MCL (ref 0.6–4.6)
LYMPHOCYTES NFR BLD AUTO: 17.1 %
MCH RBC QN AUTO: 27.2 PG (ref 27–31)
MCHC RBC AUTO-ENTMCNC: 31.5 G/DL (ref 33–36)
MCV RBC AUTO: 86.3 FL (ref 80–94)
MONOCYTES # BLD AUTO: 0.85 X10(3)/MCL (ref 0.1–1.3)
MONOCYTES NFR BLD AUTO: 13.4 %
NEUTROPHILS # BLD AUTO: 4.29 X10(3)/MCL (ref 2.1–9.2)
NEUTROPHILS NFR BLD AUTO: 67.7 %
NITRITE UR QL STRIP: NEGATIVE
NRBC BLD AUTO-RTO: 0 %
PH UR STRIP: 7 [PH]
PLATELET # BLD AUTO: 199 X10(3)/MCL (ref 130–400)
PMV BLD AUTO: 10.9 FL (ref 7.4–10.4)
POTASSIUM SERPL-SCNC: 4.1 MMOL/L (ref 3.5–5.1)
PROT SERPL-MCNC: 7.3 GM/DL (ref 5.8–7.6)
PROT UR QL STRIP: NEGATIVE
RBC # BLD AUTO: 3.64 X10(6)/MCL (ref 4.7–6.1)
RBC #/AREA URNS AUTO: ABNORMAL /HPF
SARS-COV-2 RNA RESP QL NAA+PROBE: NOT DETECTED
SODIUM SERPL-SCNC: 141 MMOL/L (ref 136–145)
SP GR UR STRIP.AUTO: 1.01 (ref 1–1.03)
SQUAMOUS #/AREA URNS LPF: ABNORMAL /HPF
TIBC SERPL-MCNC: 349 UG/DL (ref 60–240)
TIBC SERPL-MCNC: 416 UG/DL (ref 250–450)
TRANSFERRIN SERPL-MCNC: 386 MG/DL (ref 163–344)
TROPONIN I SERPL-MCNC: 0.02 NG/ML (ref 0–0.04)
UROBILINOGEN UR STRIP-ACNC: NORMAL
WBC # BLD AUTO: 6.33 X10(3)/MCL (ref 4.5–11.5)
WBC #/AREA URNS AUTO: ABNORMAL /HPF

## 2024-12-16 PROCEDURE — 96374 THER/PROPH/DIAG INJ IV PUSH: CPT

## 2024-12-16 PROCEDURE — 99900035 HC TECH TIME PER 15 MIN (STAT)

## 2024-12-16 PROCEDURE — 83550 IRON BINDING TEST: CPT

## 2024-12-16 PROCEDURE — 81001 URINALYSIS AUTO W/SCOPE: CPT

## 2024-12-16 PROCEDURE — 25000242 PHARM REV CODE 250 ALT 637 W/ HCPCS: Performed by: EMERGENCY MEDICINE

## 2024-12-16 PROCEDURE — 93005 ELECTROCARDIOGRAM TRACING: CPT

## 2024-12-16 PROCEDURE — 99285 EMERGENCY DEPT VISIT HI MDM: CPT | Mod: 25

## 2024-12-16 PROCEDURE — 83880 ASSAY OF NATRIURETIC PEPTIDE: CPT | Performed by: EMERGENCY MEDICINE

## 2024-12-16 PROCEDURE — 0240U COVID/FLU A&B PCR: CPT | Performed by: EMERGENCY MEDICINE

## 2024-12-16 PROCEDURE — 84484 ASSAY OF TROPONIN QUANT: CPT | Performed by: EMERGENCY MEDICINE

## 2024-12-16 PROCEDURE — 63600175 PHARM REV CODE 636 W HCPCS: Performed by: EMERGENCY MEDICINE

## 2024-12-16 PROCEDURE — 80053 COMPREHEN METABOLIC PANEL: CPT | Performed by: EMERGENCY MEDICINE

## 2024-12-16 PROCEDURE — 25000003 PHARM REV CODE 250

## 2024-12-16 PROCEDURE — 85025 COMPLETE CBC W/AUTO DIFF WBC: CPT | Performed by: EMERGENCY MEDICINE

## 2024-12-16 PROCEDURE — 94640 AIRWAY INHALATION TREATMENT: CPT

## 2024-12-16 PROCEDURE — S4991 NICOTINE PATCH NONLEGEND: HCPCS

## 2024-12-16 PROCEDURE — 63600175 PHARM REV CODE 636 W HCPCS: Performed by: INTERNAL MEDICINE

## 2024-12-16 PROCEDURE — 82728 ASSAY OF FERRITIN: CPT

## 2024-12-16 PROCEDURE — 21400001 HC TELEMETRY ROOM

## 2024-12-16 RX ORDER — IPRATROPIUM BROMIDE AND ALBUTEROL SULFATE 2.5; .5 MG/3ML; MG/3ML
3 SOLUTION RESPIRATORY (INHALATION) EVERY 4 HOURS PRN
Status: DISCONTINUED | OUTPATIENT
Start: 2024-12-16 | End: 2024-12-20 | Stop reason: HOSPADM

## 2024-12-16 RX ORDER — NALOXONE HCL 0.4 MG/ML
0.02 VIAL (ML) INJECTION
Status: DISCONTINUED | OUTPATIENT
Start: 2024-12-16 | End: 2024-12-20 | Stop reason: HOSPADM

## 2024-12-16 RX ORDER — IBUPROFEN 200 MG
16 TABLET ORAL
Status: DISCONTINUED | OUTPATIENT
Start: 2024-12-16 | End: 2024-12-20 | Stop reason: HOSPADM

## 2024-12-16 RX ORDER — CETIRIZINE HYDROCHLORIDE 10 MG/1
10 TABLET ORAL DAILY
Status: DISCONTINUED | OUTPATIENT
Start: 2024-12-16 | End: 2024-12-20 | Stop reason: HOSPADM

## 2024-12-16 RX ORDER — IBUPROFEN 200 MG
24 TABLET ORAL
Status: DISCONTINUED | OUTPATIENT
Start: 2024-12-16 | End: 2024-12-20 | Stop reason: HOSPADM

## 2024-12-16 RX ORDER — CLOPIDOGREL BISULFATE 75 MG/1
75 TABLET ORAL DAILY
Status: DISCONTINUED | OUTPATIENT
Start: 2024-12-16 | End: 2024-12-20 | Stop reason: HOSPADM

## 2024-12-16 RX ORDER — ACETAMINOPHEN 325 MG/1
650 TABLET ORAL EVERY 4 HOURS PRN
Status: DISCONTINUED | OUTPATIENT
Start: 2024-12-16 | End: 2024-12-20 | Stop reason: HOSPADM

## 2024-12-16 RX ORDER — FOLIC ACID 1 MG/1
1 TABLET ORAL DAILY
Status: DISCONTINUED | OUTPATIENT
Start: 2024-12-16 | End: 2024-12-20 | Stop reason: HOSPADM

## 2024-12-16 RX ORDER — KETOROLAC TROMETHAMINE 30 MG/ML
15 INJECTION, SOLUTION INTRAMUSCULAR; INTRAVENOUS ONCE
Status: COMPLETED | OUTPATIENT
Start: 2024-12-16 | End: 2024-12-16

## 2024-12-16 RX ORDER — IPRATROPIUM BROMIDE AND ALBUTEROL SULFATE 2.5; .5 MG/3ML; MG/3ML
3 SOLUTION RESPIRATORY (INHALATION)
Status: COMPLETED | OUTPATIENT
Start: 2024-12-16 | End: 2024-12-16

## 2024-12-16 RX ORDER — GLUCAGON 1 MG
1 KIT INJECTION
Status: DISCONTINUED | OUTPATIENT
Start: 2024-12-16 | End: 2024-12-20 | Stop reason: HOSPADM

## 2024-12-16 RX ORDER — FUROSEMIDE 10 MG/ML
80 INJECTION INTRAMUSCULAR; INTRAVENOUS
Status: COMPLETED | OUTPATIENT
Start: 2024-12-16 | End: 2024-12-16

## 2024-12-16 RX ORDER — PANTOPRAZOLE SODIUM 40 MG/1
40 TABLET, DELAYED RELEASE ORAL 2 TIMES DAILY
Status: DISCONTINUED | OUTPATIENT
Start: 2024-12-16 | End: 2024-12-20 | Stop reason: HOSPADM

## 2024-12-16 RX ORDER — IBUPROFEN 200 MG
1 TABLET ORAL DAILY
Status: DISCONTINUED | OUTPATIENT
Start: 2024-12-16 | End: 2024-12-20 | Stop reason: HOSPADM

## 2024-12-16 RX ORDER — TALC
6 POWDER (GRAM) TOPICAL NIGHTLY PRN
Status: DISCONTINUED | OUTPATIENT
Start: 2024-12-16 | End: 2024-12-20 | Stop reason: HOSPADM

## 2024-12-16 RX ORDER — ATORVASTATIN CALCIUM 40 MG/1
80 TABLET, FILM COATED ORAL DAILY
Status: DISCONTINUED | OUTPATIENT
Start: 2024-12-16 | End: 2024-12-20 | Stop reason: HOSPADM

## 2024-12-16 RX ORDER — SODIUM CHLORIDE 0.9 % (FLUSH) 0.9 %
10 SYRINGE (ML) INJECTION EVERY 12 HOURS PRN
Status: DISCONTINUED | OUTPATIENT
Start: 2024-12-16 | End: 2024-12-20 | Stop reason: HOSPADM

## 2024-12-16 RX ADMIN — KETOROLAC TROMETHAMINE 15 MG: 30 INJECTION, SOLUTION INTRAMUSCULAR; INTRAVENOUS at 07:12

## 2024-12-16 RX ADMIN — FUROSEMIDE 80 MG: 10 INJECTION, SOLUTION INTRAMUSCULAR; INTRAVENOUS at 12:12

## 2024-12-16 RX ADMIN — IPRATROPIUM BROMIDE AND ALBUTEROL SULFATE 3 ML: 2.5; .5 SOLUTION RESPIRATORY (INHALATION) at 12:12

## 2024-12-16 RX ADMIN — RIVAROXABAN 20 MG: 10 TABLET, FILM COATED ORAL at 04:12

## 2024-12-16 RX ADMIN — PANTOPRAZOLE SODIUM 40 MG: 40 TABLET, DELAYED RELEASE ORAL at 08:12

## 2024-12-16 RX ADMIN — NICOTINE 1 PATCH: 14 PATCH, EXTENDED RELEASE TRANSDERMAL at 04:12

## 2024-12-16 NOTE — TELEPHONE ENCOUNTER
Spoke with Ledy from [facility]. Advised that provider wants patient to be sent to ED for evaluation for CHF exacerbation. She verbalized understanding and will contact Gege.

## 2024-12-16 NOTE — H&P
"[facility]  History & Physical Note      Patient Name: Ran Reyes Jr.  : 1955  MRN: [id number]  Patient Class: IP- Inpatient   Admission Date: 2024   Length of Stay: 0  Admitting Service: Hospital Medicine List 2   Attending Physician: Terry Boyd MD  PCP: Abiodun Recinos DO  Source of history: Patient and EMR.       Chief Complaint   Shortness of Breath (Started last night ) and Leg Swelling (Bilateral leg swelling that started last night states "I have been taking my medications but the fluid just built up")      History of Present Illness   Ran Reyes is a 70 yo AA M w a PMHx of HFrEF EF 30% with valvular insufficiency, HTN, PVD, HLD, CAD, and COPD who presents to [facility] ED on 24 with complaints of 1 day history of shortness of breath, dry cough, PND, and B/L leg swelling. Patient states his dry weight is 220lbs but was discharged from previous hospitalization above this weight and only lost 3lbs during this admission dated 24. Patient reports today's home weight around 240lbs. He additionally complains of B/L leg swelling as well as acute LLE pain. Denies hx of PE and DVTs. Denies increased wheezing, sputum production, and sputum color change. Reports medication compliance but cannot state which medications he takes at this time.    Upon ED arrival, VSS and afebrile. Lab work significant for moderate normocytic anemia with increased RDW. Add on Iron panel pending. BNP elevated 1343 with negative troponin. COVID/Flu negative. EKG consistent with rate controlled A fib and borderline Qtc prolongation, 493 msec. CXR consistent with vascular congestion and enlarged cardiac silhouette. Internal medicine consulted for acute exacerbation of HFrEF.     ROS   Pertinent positive and negative as mentioned in HPI     Past Medical History     Past Medical History:   Diagnosis Date    A-fib     Anticoagulant long-term use     Aortic aneurysm     " Cataract     CHF (congestive heart failure)     Chronic atrial fibrillation     COPD (chronic obstructive pulmonary disease)     Coronary artery disease     HLD (hyperlipidemia)     Hypertension     Mitral regurgitation     PAD (peripheral artery disease)     Primary open angle glaucoma (POAG)        Past Surgical History     Past Surgical History:   Procedure Laterality Date    ANGIOGRAM, CORONARY, WITH LEFT HEART CATHETERIZATION N/A 3/26/2024    Procedure: Angiogram, Coronary, with Left Heart Cath;  Surgeon: Adriano Montiel MD;  Location: [facility] CATH LAB;  Service: Cardiology;  Laterality: N/A;    ATHERECTOMY OF PERIPHERAL VESSEL Left 09/12/2022    LEFT SFA ATHERECTOMY, BALLOON ANGIOPLASTY    CATARACT EXTRACTION W/  INTRAOCULAR LENS IMPLANT Left 3/9/2023    Procedure: EXTRACTION, CATARACT, WITH IOL INSERTION;  Surgeon: Georgi Borja MD;  Location: [facility];  Service: Ophthalmology;  Laterality: Left;  19.5  mac    EGD, WITH CLOSED BIOPSY  3/22/2024    Procedure: EGD, WITH CLOSED BIOPSY;  Surgeon: Ronald Calderon MD;  Location: [facility] ENDOSCOPY;  Service: Gastroenterology;;    ESOPHAGOGASTRODUODENOSCOPY N/A 3/22/2024    Procedure: EGD;  Surgeon: Ronald Calderon MD;  Location: [facility] ENDOSCOPY;  Service: Gastroenterology;  Laterality: N/A;    Heart Stent N/A     > 10yrs. AGO    INCISION AND DRAINAGE N/A 3/18/2024    Procedure: Incision and Drainage;  Surgeon: Fahad Rivas MD;  Location: [facility];  Service: Urology;  Laterality: N/A;  I&D SCROTAL ABSCESS    INSERTION OF STENT INTO PERIPHERAL VESSEL Right 10/17/2022    RIGHT SFA ATHERECTOMY, BALLOON ANGIOPLASTY, STENT; RIGHT ANTERIOR TIBIAL ARTERY ATHERECTOMY, BALLOON ANGIOPLASTY    ORCHIECTOMY Left 3/18/2024    Procedure: ORCHIECTOMY;  Surgeon: Fahad Rivas MD;  Location: [facility];  Service: Urology;  Laterality: Left;       Social History     Social History     Tobacco Use    Smoking status: Every Day     Current packs/day: 0.25      Average packs/day: 0.3 packs/day for 40.0 years (10.0 ttl pk-yrs)     Types: Cigarettes     Passive exposure: Current    Smokeless tobacco: Never    Tobacco comments:     States smoke 2-3 cigarettes per day   Substance Use Topics    Alcohol use: Yes     Alcohol/week: 3.0 standard drinks of alcohol     Types: 3 Cans of beer per week     Comment: socially        Family History   Reviewed and noncontributory    Allergies   Patient has no known allergies.    Home Medications     Prior to Admission medications    Medication Sig Start Date End Date Taking? Authorizing Provider   albuterol-ipratropium (DUO-NEB) 2.5 mg-0.5 mg/3 mL nebulizer solution Take 3 mLs by nebulization every 6 (six) hours as needed for Wheezing. Rescue 12/9/24   Diana Hassan MD   aspirin (ECOTRIN) 81 MG EC tablet Take 1 tablet (81 mg total) by mouth once daily. 4/9/24 4/9/25  Tha Edmond MD   atorvastatin (LIPITOR) 80 MG tablet Take 1 tablet (80 mg total) by mouth once daily. 8/8/24 8/8/25  Abiodun Recinos DO   cetirizine (ZYRTEC) 10 MG tablet Take 1 tablet (10 mg total) by mouth once daily. 8/8/24 8/3/25  Abiodun Recinos DO   clopidogreL (PLAVIX) 75 mg tablet Take 75 mg by mouth once daily.    Provider, Historical   folic acid (FOLVITE) 1 MG tablet Take 1 tablet (1 mg total) by mouth once daily. 4/9/24 4/9/25  Tha Edmond MD   furosemide (LASIX) 40 MG tablet Take 1 tablet (40 mg total) by mouth 2 (two) times a day. 9/23/24   Aung Verduzco MD   hydrOXYzine HCL (ATARAX) 10 MG Tab Take 50 mg by mouth 2 (two) times daily as needed.  Patient not taking: Reported on 8/26/2024    Provider, Historical   metoprolol succinate (TOPROL-XL) 25 MG 24 hr tablet Take 0.5 tablets (12.5 mg total) by mouth once daily.  Patient not taking: Reported on 9/3/2024 8/8/24 8/8/25  Abiodun Recinos DO   midodrine (PROAMATINE) 10 MG tablet Take 1 tablet (10 mg total) by mouth 2 (two) times daily with meals. 12/9/24   Anushka Clarke DO nitrofurantoin,  macrocrystal-monohydrate, (MACROBID) 100 MG capsule Take 1 capsule (100 mg total) by mouth 2 (two) times daily. for 7 days 12/9/24 12/16/24  Anushka Clarke DO   pantoprazole (PROTONIX) 40 MG tablet Take 1 tablet (40 mg total) by mouth 2 (two) times a day. 9/23/24   Abiodun Recinos DO   potassium chloride SA (K-DUR,KLOR-CON) 20 MEQ tablet Take 1 tablet (20 mEq total) by mouth 2 (two) times daily. 9/23/24   Aung Verduzco MD   predniSONE (DELTASONE) 20 MG tablet Take 2 tablets (40 mg total) by mouth once daily. 12/10/24   Anushka Clarke DO   rivaroxaban (XARELTO) 20 mg Tab Take 1 tablet (20 mg total) by mouth Daily. 7/16/24   Joe Clarke MD   sacubitriL-valsartan (ENTRESTO) 24-26 mg per tablet Take 1 tablet by mouth 2 (two) times daily. 12/9/24   Anushka Clarke DO   spironolactone (ALDACTONE) 50 MG tablet Take 1 tablet (50 mg total) by mouth once daily. 8/17/24   Sana James MD   triamcinolone acetonide 0.1% (KENALOG) 0.1 % cream Apply topically Daily.    Provider, Historical   varenicline (CHANTIX STARTING MONTH BOX) 0.5 mg (11)- 1 mg (42) tablet Take one 0.5mg tab by mouth once daily X3 days,then increase to one 0.5mg tab twice daily X4 days,then increase to one 1mg tab twice daily  Patient not taking: Reported on 8/26/2024 8/8/24   Abiodun Recinos DO        Inpatient Medications   Scheduled Meds    Continuous Infusions    PRN Meds    Current Facility-Administered Medications:     dextrose 50%, 12.5 g, Intravenous, PRN    dextrose 50%, 25 g, Intravenous, PRN    glucagon (human recombinant), 1 mg, Intramuscular, PRN    glucose, 16 g, Oral, PRN    glucose, 24 g, Oral, PRN    melatonin, 6 mg, Oral, Nightly PRN    naloxone, 0.02 mg, Intravenous, PRN    sodium chloride 0.9%, 10 mL, Intravenous, Q12H PRN    Physical Exam   Vital Signs  Temp:  [98.3 °F (36.8 °C)]   Pulse:  [64-86]   Resp:  [13-28]   BP: (109-121)/(63-89)   SpO2:  [92 %-100 %]       Physical Exam  Vitals and nursing note reviewed.  "  Constitutional:       General: He is not in acute distress.     Appearance: He is obese. He is ill-appearing. He is not toxic-appearing or diaphoretic.   HENT:      Head: Normocephalic and atraumatic.      Nose: Nose normal. No congestion or rhinorrhea.   Eyes:      General: No scleral icterus.     Pupils: Pupils are equal, round, and reactive to light.   Neck:      Comments: +JVD  Cardiovascular:      Rate and Rhythm: Normal rate. Rhythm irregular.      Pulses: Normal pulses.      Heart sounds: Murmur heard.   Pulmonary:      Effort: Pulmonary effort is normal. No respiratory distress.      Breath sounds: No stridor. Rales present. No wheezing or rhonchi.      Comments: Diffuse inspiratory rales   Abdominal:      General: Abdomen is flat. Bowel sounds are normal.      Palpations: Abdomen is soft.      Tenderness: There is no abdominal tenderness.   Musculoskeletal:         General: Swelling and tenderness present. Normal range of motion.      Cervical back: Normal range of motion.      Right lower leg: Edema present.      Left lower leg: Edema present.      Comments: B/L lower extremity pitting edema with LLE tenderness to palpation.   + Tony's Sing, LLE.    Lymphadenopathy:      Cervical: No cervical adenopathy.   Skin:     General: Skin is warm and dry.      Capillary Refill: Capillary refill takes less than 2 seconds.      Coloration: Skin is not jaundiced.   Neurological:      General: No focal deficit present.      Mental Status: He is alert and oriented to person, place, and time. Mental status is at baseline.          Labs     Recent Labs     12/16/24  1348   WBC 6.33   RBC 3.64*   HGB 9.9*   HCT 31.4*   MCV 86.3   MCH 27.2   MCHC 31.5*   RDW 20.7*        No results for input(s): "LACTIC" in the last 72 hours.  No results for input(s): "INR", "APTT", "D-DIMER" in the last 72 hours.  No results for input(s): "HGBA1C", "CHOL", "TRIG", "LDL", "VLDL", "HDL" in the last 72 hours.  No results for " "input(s): "PH", "PCO2", "PO2", "HCO3", "POCSATURATED", "BE" in the last 72 hours.    Recent Labs     12/16/24  1313      K 4.1   CO2 24   BUN 24.0   CREATININE 1.12   GLUCOSE 93   CALCIUM 9.3   ALBUMIN 4.0   GLOBULIN 3.3   ALKPHOS 120   ALT 20   AST 30   BILITOT 1.3     Recent Labs     12/16/24  1313 12/16/24  1348   BNP  --  1,343.9*   TROPONINI 0.021  --           Microbiology Results (last 7 days)       ** No results found for the last 168 hours. **           Imaging     X-Ray Chest 1 View   Final Result      No acute chest disease is identified..      Cardiomegaly         Electronically signed by: Lito Hurst   Date:    12/16/2024   Time:    14:11        Assessment & Plan   PLAN:  Acute Exacerbation of HFrEF EF 30%   Moderate MR, mild to moderate TR  Mildly elevated mean PASP, 44mmHg   HTN  A fib, rate controlled   Hx of CAD   Hx of HLD    Hx of PVD   - Dry weight : 220lbs per patient, weight on admission: 241lbs    - BNP elevated 1343, troponin negative   - S/p 1 x IV Lasix 80mg in ED, reassess fluid status in AM for further diuresis   - Cardiac monitoring, daily weights, and strict I&Os   - Daily AM labs, keep K>4, Phos>3 and Mg>2, replete as needed   - Continue home Toprol 12.5mg qd and Xarelto 20mg qd   - Continue Plavix 75mg qd, Lipitor 80mg qd  - Holding Entresto for now due to patient reports of intolerance 2/2 hypotension     Acute LLE Pain   - STAT LLE U/S to rule out DVT ordered     Moderate Normocytic Anemia  - Elevated RDW, add on iron panel pending     Hx of COPD   - Does not appear to be in acute exacerbation of COPD at this time   - Minimal wheezing on exam and sating well on room air; does use home O2 as needed   - PRN Duonebs q 4hr       Access: Peripheral   Antibiotics: None   Diet: Cardiac, Low Na, 1.5L fluid restriction   DVT Prophylaxis: Home Xarelto   GI Prophylaxis: None   Fluids: None     Tonja Layne MD  [facility] HO2   "

## 2024-12-16 NOTE — TELEPHONE ENCOUNTER
Essie the nurse with [facility] called to report pt had a 10 lbs weight gain in 4 days. Both legs swollen (R) bigger than (L). B/P 118/68 O2 99% HR 66. Increased fatigue

## 2024-12-16 NOTE — ED PROVIDER NOTES
"Encounter Date: 12/16/2024       History     Chief Complaint   Patient presents with    Shortness of Breath     Started last night     Leg Swelling     Bilateral leg swelling that started last night states "I have been taking my medications but the fluid just built up"     He returns again for volume overload despite reported good compliance with medications.  Recently admitted here under similar circumstances, discharged about a week ago, has regained about 9 lb since the time of discharge, states he is following fluid and sodium restrictions.  He feels fatigued, has dyspnea with rest and dyspnea on minimal exertion, mild headache, feels increased swelling in both legs and his abdomen, generally uncomfortable all over.  No fever, chest pain, vomiting, diarrhea, or other specific complaints.    The history is provided by the patient. No  was used.     Review of patient's allergies indicates:  No Known Allergies  Past Medical History:   Diagnosis Date    A-fib     Anticoagulant long-term use     Aortic aneurysm     Cataract     CHF (congestive heart failure)     Chronic atrial fibrillation     COPD (chronic obstructive pulmonary disease)     Coronary artery disease     HLD (hyperlipidemia)     Hypertension     Mitral regurgitation     PAD (peripheral artery disease)     Primary open angle glaucoma (POAG)      Past Surgical History:   Procedure Laterality Date    ANGIOGRAM, CORONARY, WITH LEFT HEART CATHETERIZATION N/A 3/26/2024    Procedure: Angiogram, Coronary, with Left Heart Cath;  Surgeon: Adriano Montiel MD;  Location: [facility] CATH LAB;  Service: Cardiology;  Laterality: N/A;    ATHERECTOMY OF PERIPHERAL VESSEL Left 09/12/2022    LEFT SFA ATHERECTOMY, BALLOON ANGIOPLASTY    CATARACT EXTRACTION W/  INTRAOCULAR LENS IMPLANT Left 3/9/2023    Procedure: EXTRACTION, CATARACT, WITH IOL INSERTION;  Surgeon: Georgi Borja MD;  Location: [facility];  Service: Ophthalmology;  Laterality: Left;  19.5  mac "    EGD, WITH CLOSED BIOPSY  3/22/2024    Procedure: EGD, WITH CLOSED BIOPSY;  Surgeon: Ronald Calderon MD;  Location: [facility] ENDOSCOPY;  Service: Gastroenterology;;    ESOPHAGOGASTRODUODENOSCOPY N/A 3/22/2024    Procedure: EGD;  Surgeon: Ronald Calderon MD;  Location: [facility] ENDOSCOPY;  Service: Gastroenterology;  Laterality: N/A;    Heart Stent N/A     > 10yrs. AGO    INCISION AND DRAINAGE N/A 3/18/2024    Procedure: Incision and Drainage;  Surgeon: Fahad Rivas MD;  Location: [facility] OR;  Service: Urology;  Laterality: N/A;  I&D SCROTAL ABSCESS    INSERTION OF STENT INTO PERIPHERAL VESSEL Right 10/17/2022    RIGHT SFA ATHERECTOMY, BALLOON ANGIOPLASTY, STENT; RIGHT ANTERIOR TIBIAL ARTERY ATHERECTOMY, BALLOON ANGIOPLASTY    ORCHIECTOMY Left 3/18/2024    Procedure: ORCHIECTOMY;  Surgeon: Fahad Rivas MD;  Location: [facility];  Service: Urology;  Laterality: Left;     Family History   Problem Relation Name Age of Onset    Cancer Mother      Hypertension Mother      Heart failure Father      Stroke Father      Hypertension Father      No Known Problems Sister       Social History     Tobacco Use    Smoking status: Every Day     Current packs/day: 0.25     Average packs/day: 0.3 packs/day for 40.0 years (10.0 ttl pk-yrs)     Types: Cigarettes     Passive exposure: Current    Smokeless tobacco: Never    Tobacco comments:     States smoke 2-3 cigarettes per day   Substance Use Topics    Alcohol use: Yes     Alcohol/week: 3.0 standard drinks of alcohol     Types: 3 Cans of beer per week     Comment: socially    Drug use: Not Currently     Frequency: 1.0 times per week     Types: Marijuana     Comment: no use in over 1 year     Review of Systems   Respiratory:  Positive for shortness of breath and wheezing.    Cardiovascular:  Positive for leg swelling.       Physical Exam     Initial Vitals [12/16/24 1205]   BP Pulse Resp Temp SpO2   109/70 76 18 98.3 °F (36.8 °C) 97 %      MAP       --          Physical Exam    Nursing note and vitals reviewed.  Constitutional: He appears well-developed and well-nourished. He appears distressed.   Moderate discomfort, mild tachypnea, obvious volume overload, moderate obesity   HENT:   Head: Normocephalic and atraumatic.   Eyes: EOM are normal. Pupils are equal, round, and reactive to light.   Neck: Neck supple.   Normal range of motion.  Cardiovascular:  Normal rate.           Irregular rhythm   Pulmonary/Chest: He is in respiratory distress. He has wheezes. He has rhonchi. He has rales.   Coarse breath sounds with decreased air entry throughout, mild tachypnea   Abdominal: Abdomen is soft. Bowel sounds are normal.   Musculoskeletal:         General: Edema present.      Cervical back: Normal range of motion and neck supple.      Comments: Two or 3+ pitting edema to thighs     Neurological: He is alert and oriented to person, place, and time. He has normal strength.         ED Course   Procedures  Labs Reviewed   CBC WITH DIFFERENTIAL - Abnormal       Result Value    WBC 6.33      RBC 3.64 (*)     Hgb 9.9 (*)     Hct 31.4 (*)     MCV 86.3      MCH 27.2      MCHC 31.5 (*)     RDW 20.7 (*)     Platelet 199      MPV 10.9 (*)     Neut % 67.7      Lymph % 17.1      Mono % 13.4      Eos % 1.3      Basophil % 0.2      Lymph # 1.08      Neut # 4.29      Mono # 0.85      Eos # 0.08      Baso # 0.01      IG# 0.02      IG% 0.3      NRBC% 0.0     TROPONIN I - Normal    Troponin-I 0.021     CBC W/ AUTO DIFFERENTIAL    Narrative:     The following orders were created for panel order CBC Auto Differential.  Procedure                               Abnormality         Status                     ---------                               -----------         ------                     CBC with Differential[[id number]]       Abnormal            Final result                 Please view results for these tests on the individual orders.   COMPREHENSIVE METABOLIC PANEL    Sodium 141       Potassium 4.1      Chloride 105      CO2 24      Glucose 93      Blood Urea Nitrogen 24.0      Creatinine 1.12      Calcium 9.3      Protein Total 7.3      Albumin 4.0      Globulin 3.3      Albumin/Globulin Ratio 1.2      Bilirubin Total 1.3            ALT 20      AST 30      eGFR >60      Anion Gap 12.0      BUN/Creatinine Ratio 21     B-TYPE NATRIURETIC PEPTIDE   COVID/FLU A&B PCR   EXTRA TUBES    Narrative:     The following orders were created for panel order EXTRA TUBES.  Procedure                               Abnormality         Status                     ---------                               -----------         ------                     Gold Top Hold[[id number]]                                   In process                   Please view results for these tests on the individual orders.   GOLD TOP HOLD   EXTRA TUBES    Narrative:     The following orders were created for panel order EXTRA TUBES.  Procedure                               Abnormality         Status                     ---------                               -----------         ------                     Light Green Top Hold[[id number]]                            In process                 Light Green Top Hold[[id number]]                            In process                 Gold Top Hold[[id number]]                                   In process                   Please view results for these tests on the individual orders.   LIGHT GREEN TOP HOLD   LIGHT GREEN TOP HOLD   GOLD TOP HOLD     EKG Readings: (Independently Interpreted)   Initial Reading: No STEMI. Rhythm: Atrial Fibrillation. Heart Rate: 75.   Atrial fibrillation at 75 beats per minute with PVCs versus aberrancy, possible old anterior infarction, no acute change and no interval change.     ECG Results              EKG 12-lead (In process)        Collection Time Result Time QRS Duration OHS QTC Calculation    12/16/24 12:43:02 12/16/24 12:48:41 94 493                     In  process by Interface, Lab In [facility] (12/16/24 12:48:44)                   Narrative:    Test Reason : R06.02,    Vent. Rate :  75 BPM     Atrial Rate : 312 BPM     P-R Int :    ms          QRS Dur :  94 ms      QT Int : 442 ms       P-R-T Axes :      0 110 degrees    QTcB Int : 493 ms    Atrial fibrillation with premature ventricular or aberrantly conducted  complexes  Cannot rule out Anterior infarct ,age undetermined  Abnormal ECG  When compared with ECG of 05-Dec-2024 08:40,  Questionable change in The axis  Nonspecific T wave abnormality no longer evident in Inferior leads    Referred By: AAAREFERRAL SELF           Confirmed By:                                   Imaging Results              X-Ray Chest 1 View (Final result)  Result time 12/16/24 14:11:02      Final result by Lito Hurst MD (12/16/24 14:11:02)                   Impression:      No acute chest disease is identified..    Cardiomegaly      Electronically signed by: Lito Hurst  Date:    12/16/2024  Time:    14:11               Narrative:    EXAMINATION:  XR CHEST 1 VIEW    CLINICAL HISTORY:  shortness of breath;, .    FINDINGS:  No alveolar consolidation, effusion, or pneumothorax is seen.   The thoracic aorta is normal  cardiac silhouette is enlarged, central pulmonary vessels and mediastinum are normal in size and are grossly unremarkable.   visualized osseous structures are grossly unremarkable.                                       Medications   furosemide injection 80 mg (80 mg Intravenous Given 12/16/24 1254)   albuterol-ipratropium 2.5 mg-0.5 mg/3 mL nebulizer solution 3 mL (3 mLs Nebulization Given 12/16/24 1245)     Medical Decision Making  Patient with significant underlying heart disease and lung disease presenting with reaccumulation of gross volume overload with some respiratory compromise after recent hospitalization for same and diuresis.  Re consult internal medicine for admission and IV diuresis.    Problems  Addressed:  Congestive heart failure, unspecified HF chronicity, unspecified heart failure type: chronic illness or injury with exacerbation, progression, or side effects of treatment  COPD exacerbation: chronic illness or injury with exacerbation, progression, or side effects of treatment    Amount and/or Complexity of Data Reviewed  Labs: ordered. Decision-making details documented in ED Course.  Radiology: ordered. Decision-making details documented in ED Course.  ECG/medicine tests: ordered and independent interpretation performed. Decision-making details documented in ED Course.    Risk  Prescription drug management.  Decision regarding hospitalization.      Additional MDM:   Differential Diagnosis:   CHF exacerbation, COPD exacerbation, hypovolemia among many others                                    Clinical Impression:  Final diagnoses:  [R06.02] Shortness of breath  [J44.1] COPD exacerbation (Primary)  [I50.9] Congestive heart failure, unspecified HF chronicity, unspecified heart failure type  [E87.70] Hypervolemia, unspecified hypervolemia type          ED Disposition Condition    Admit Stable                Jerald Hampton MD  12/16/24 4149       Jerald Hampton MD  12/16/24 2328

## 2024-12-17 LAB
ALBUMIN SERPL-MCNC: 3.7 G/DL (ref 3.4–4.8)
ALBUMIN/GLOB SERPL: 1.1 RATIO (ref 1.1–2)
ALP SERPL-CCNC: 113 UNIT/L (ref 40–150)
ALT SERPL-CCNC: 16 UNIT/L (ref 0–55)
ANION GAP SERPL CALC-SCNC: 10 MEQ/L
AST SERPL-CCNC: 20 UNIT/L (ref 5–34)
BASOPHILS # BLD AUTO: 0.01 X10(3)/MCL
BASOPHILS NFR BLD AUTO: 0.2 %
BILIRUB SERPL-MCNC: 1.4 MG/DL
BUN SERPL-MCNC: 31.6 MG/DL (ref 8.4–25.7)
CALCIUM SERPL-MCNC: 9 MG/DL (ref 8.8–10)
CHLORIDE SERPL-SCNC: 105 MMOL/L (ref 98–107)
CO2 SERPL-SCNC: 27 MMOL/L (ref 23–31)
CREAT SERPL-MCNC: 1.24 MG/DL (ref 0.72–1.25)
CREAT/UREA NIT SERPL: 25
EOSINOPHIL # BLD AUTO: 0.08 X10(3)/MCL (ref 0–0.9)
EOSINOPHIL NFR BLD AUTO: 1.3 %
ERYTHROCYTE [DISTWIDTH] IN BLOOD BY AUTOMATED COUNT: 20.6 % (ref 11.5–17)
GFR SERPLBLD CREATININE-BSD FMLA CKD-EPI: >60 ML/MIN/1.73/M2
GLOBULIN SER-MCNC: 3.3 GM/DL (ref 2.4–3.5)
GLUCOSE SERPL-MCNC: 95 MG/DL (ref 82–115)
HCT VFR BLD AUTO: 29 % (ref 42–52)
HGB BLD-MCNC: 9.3 G/DL (ref 14–18)
IMM GRANULOCYTES # BLD AUTO: 0.01 X10(3)/MCL (ref 0–0.04)
IMM GRANULOCYTES NFR BLD AUTO: 0.2 %
LYMPHOCYTES # BLD AUTO: 1.22 X10(3)/MCL (ref 0.6–4.6)
LYMPHOCYTES NFR BLD AUTO: 19.8 %
MAGNESIUM SERPL-MCNC: 2.1 MG/DL (ref 1.6–2.6)
MCH RBC QN AUTO: 27.6 PG (ref 27–31)
MCHC RBC AUTO-ENTMCNC: 32.1 G/DL (ref 33–36)
MCV RBC AUTO: 86.1 FL (ref 80–94)
MONOCYTES # BLD AUTO: 0.83 X10(3)/MCL (ref 0.1–1.3)
MONOCYTES NFR BLD AUTO: 13.5 %
NEUTROPHILS # BLD AUTO: 4 X10(3)/MCL (ref 2.1–9.2)
NEUTROPHILS NFR BLD AUTO: 65 %
NRBC BLD AUTO-RTO: 0 %
PHOSPHATE SERPL-MCNC: 4.1 MG/DL (ref 2.3–4.7)
PLATELET # BLD AUTO: 188 X10(3)/MCL (ref 130–400)
PMV BLD AUTO: 10.8 FL (ref 7.4–10.4)
POTASSIUM SERPL-SCNC: 3.3 MMOL/L (ref 3.5–5.1)
PROT SERPL-MCNC: 7 GM/DL (ref 5.8–7.6)
RBC # BLD AUTO: 3.37 X10(6)/MCL (ref 4.7–6.1)
SODIUM SERPL-SCNC: 142 MMOL/L (ref 136–145)
WBC # BLD AUTO: 6.15 X10(3)/MCL (ref 4.5–11.5)

## 2024-12-17 PROCEDURE — 84100 ASSAY OF PHOSPHORUS: CPT

## 2024-12-17 PROCEDURE — 36415 COLL VENOUS BLD VENIPUNCTURE: CPT

## 2024-12-17 PROCEDURE — 25000242 PHARM REV CODE 250 ALT 637 W/ HCPCS

## 2024-12-17 PROCEDURE — 83735 ASSAY OF MAGNESIUM: CPT

## 2024-12-17 PROCEDURE — 94761 N-INVAS EAR/PLS OXIMETRY MLT: CPT

## 2024-12-17 PROCEDURE — 63600175 PHARM REV CODE 636 W HCPCS

## 2024-12-17 PROCEDURE — S4991 NICOTINE PATCH NONLEGEND: HCPCS

## 2024-12-17 PROCEDURE — 21400001 HC TELEMETRY ROOM

## 2024-12-17 PROCEDURE — 25000003 PHARM REV CODE 250

## 2024-12-17 PROCEDURE — 97165 OT EVAL LOW COMPLEX 30 MIN: CPT

## 2024-12-17 PROCEDURE — 99900035 HC TECH TIME PER 15 MIN (STAT)

## 2024-12-17 PROCEDURE — 85025 COMPLETE CBC W/AUTO DIFF WBC: CPT

## 2024-12-17 PROCEDURE — 80053 COMPREHEN METABOLIC PANEL: CPT

## 2024-12-17 PROCEDURE — 94640 AIRWAY INHALATION TREATMENT: CPT

## 2024-12-17 PROCEDURE — 97162 PT EVAL MOD COMPLEX 30 MIN: CPT

## 2024-12-17 PROCEDURE — 27000221 HC OXYGEN, UP TO 24 HOURS

## 2024-12-17 RX ORDER — SPIRONOLACTONE 25 MG/1
50 TABLET ORAL DAILY
Status: DISCONTINUED | OUTPATIENT
Start: 2024-12-17 | End: 2024-12-20 | Stop reason: HOSPADM

## 2024-12-17 RX ORDER — FUROSEMIDE 10 MG/ML
40 INJECTION INTRAMUSCULAR; INTRAVENOUS EVERY 12 HOURS
Status: DISCONTINUED | OUTPATIENT
Start: 2024-12-17 | End: 2024-12-20 | Stop reason: HOSPADM

## 2024-12-17 RX ORDER — POTASSIUM CHLORIDE 20 MEQ/1
40 TABLET, EXTENDED RELEASE ORAL ONCE
Status: COMPLETED | OUTPATIENT
Start: 2024-12-17 | End: 2024-12-17

## 2024-12-17 RX ORDER — POTASSIUM CHLORIDE 20 MEQ/1
20 TABLET, EXTENDED RELEASE ORAL 2 TIMES DAILY
Status: DISCONTINUED | OUTPATIENT
Start: 2024-12-17 | End: 2024-12-20 | Stop reason: HOSPADM

## 2024-12-17 RX ADMIN — IPRATROPIUM BROMIDE AND ALBUTEROL SULFATE 3 ML: .5; 3 SOLUTION RESPIRATORY (INHALATION) at 07:12

## 2024-12-17 RX ADMIN — FUROSEMIDE 40 MG: 10 INJECTION, SOLUTION INTRAMUSCULAR; INTRAVENOUS at 08:12

## 2024-12-17 RX ADMIN — METOPROLOL SUCCINATE 12.5 MG: 25 TABLET, EXTENDED RELEASE ORAL at 08:12

## 2024-12-17 RX ADMIN — PANTOPRAZOLE SODIUM 40 MG: 40 TABLET, DELAYED RELEASE ORAL at 10:12

## 2024-12-17 RX ADMIN — CLOPIDOGREL BISULFATE 75 MG: 75 TABLET ORAL at 08:12

## 2024-12-17 RX ADMIN — FOLIC ACID 1 MG: 1 TABLET ORAL at 08:12

## 2024-12-17 RX ADMIN — NICOTINE 1 PATCH: 14 PATCH, EXTENDED RELEASE TRANSDERMAL at 08:12

## 2024-12-17 RX ADMIN — PANTOPRAZOLE SODIUM 40 MG: 40 TABLET, DELAYED RELEASE ORAL at 08:12

## 2024-12-17 RX ADMIN — SPIRONOLACTONE 50 MG: 25 TABLET ORAL at 12:12

## 2024-12-17 RX ADMIN — FUROSEMIDE 40 MG: 10 INJECTION, SOLUTION INTRAMUSCULAR; INTRAVENOUS at 10:12

## 2024-12-17 RX ADMIN — RIVAROXABAN 20 MG: 10 TABLET, FILM COATED ORAL at 04:12

## 2024-12-17 RX ADMIN — IPRATROPIUM BROMIDE AND ALBUTEROL SULFATE 3 ML: .5; 3 SOLUTION RESPIRATORY (INHALATION) at 03:12

## 2024-12-17 RX ADMIN — POTASSIUM CHLORIDE 40 MEQ: 1500 TABLET, EXTENDED RELEASE ORAL at 08:12

## 2024-12-17 RX ADMIN — POTASSIUM CHLORIDE 20 MEQ: 1500 TABLET, EXTENDED RELEASE ORAL at 10:12

## 2024-12-17 RX ADMIN — ATORVASTATIN CALCIUM 80 MG: 40 TABLET, FILM COATED ORAL at 08:12

## 2024-12-17 RX ADMIN — IPRATROPIUM BROMIDE AND ALBUTEROL SULFATE 3 ML: .5; 3 SOLUTION RESPIRATORY (INHALATION) at 12:12

## 2024-12-17 RX ADMIN — CETIRIZINE HYDROCHLORIDE 10 MG: 10 TABLET, FILM COATED ORAL at 08:12

## 2024-12-17 NOTE — PLAN OF CARE
Problem: Adult Inpatient Plan of Care  Goal: Plan of Care Review  12/17/2024 1644 by Gallo Diaz LPN  Outcome: Progressing  12/17/2024 0906 by Gallo Diaz LPN  Outcome: Progressing  Goal: Patient-Specific Goal (Individualized)  12/17/2024 1644 by Gallo Diaz LPN  Outcome: Progressing  12/17/2024 0906 by Gallo Diaz LPN  Outcome: Progressing  Goal: Absence of Hospital-Acquired Illness or Injury  12/17/2024 1644 by Gallo Diaz LPN  Outcome: Progressing  12/17/2024 0906 by Gallo Diaz LPN  Outcome: Progressing  Goal: Optimal Comfort and Wellbeing  12/17/2024 1644 by Gallo Diaz LPN  Outcome: Progressing  12/17/2024 0906 by Gallo Diaz LPN  Outcome: Progressing  Goal: Readiness for Transition of Care  12/17/2024 1644 by Gallo Diaz LPN  Outcome: Progressing  12/17/2024 0906 by Gallo Diaz LPN  Outcome: Progressing     Problem: Fall Injury Risk  Goal: Absence of Fall and Fall-Related Injury  12/17/2024 1644 by Gallo Diaz LPN  Outcome: Progressing  12/17/2024 0906 by Gallo Diaz LPN  Outcome: Progressing     Problem: Adult Inpatient Plan of Care  Goal: Plan of Care Review  12/17/2024 1644 by Gallo Diaz LPN  Outcome: Progressing  12/17/2024 0906 by Gallo Diaz LPN  Outcome: Progressing  Goal: Patient-Specific Goal (Individualized)  12/17/2024 1644 by Gallo Diaz LPN  Outcome: Progressing  12/17/2024 0906 by Gallo Diaz LPN  Outcome: Progressing  Goal: Absence of Hospital-Acquired Illness or Injury  12/17/2024 1644 by Gallo Diaz LPN  Outcome: Progressing  12/17/2024 0906 by Gallo Diaz LPN  Outcome: Progressing  Goal: Optimal Comfort and Wellbeing  12/17/2024 1644 by Gallo Diaz LPN  Outcome: Progressing  12/17/2024 0906 by Gallo Diaz LPN  Outcome: Progressing  Goal: Readiness for Transition of Care  12/17/2024 1644 by Joe, Justacia, LPN  Outcome: Progressing  12/17/2024 0906 by Gallo Diaz,  LPN  Outcome: Progressing     Problem: Fall Injury Risk  Goal: Absence of Fall and Fall-Related Injury  12/17/2024 1644 by Gallo Diaz LPN  Outcome: Progressing  12/17/2024 0906 by Gallo Diaz LPN  Outcome: Progressing

## 2024-12-17 NOTE — PLAN OF CARE
Problem: Adult Inpatient Plan of Care  Goal: Plan of Care Review  Outcome: Progressing  Goal: Patient-Specific Goal (Individualized)  Outcome: Progressing  Goal: Absence of Hospital-Acquired Illness or Injury  Outcome: Progressing  Goal: Optimal Comfort and Wellbeing  Outcome: Progressing  Goal: Readiness for Transition of Care  Outcome: Progressing     Problem: Fall Injury Risk  Goal: Absence of Fall and Fall-Related Injury  Outcome: Progressing     Problem: Adult Inpatient Plan of Care  Goal: Plan of Care Review  Outcome: Progressing  Goal: Patient-Specific Goal (Individualized)  Outcome: Progressing  Goal: Absence of Hospital-Acquired Illness or Injury  Outcome: Progressing  Goal: Optimal Comfort and Wellbeing  Outcome: Progressing  Goal: Readiness for Transition of Care  Outcome: Progressing     Problem: Fall Injury Risk  Goal: Absence of Fall and Fall-Related Injury  Outcome: Progressing

## 2024-12-17 NOTE — CONSULTS
Consult for home health noted. Patient is current with [facility]. Referral has been sent via Jenn Rykert.

## 2024-12-17 NOTE — PROGRESS NOTES
[facility] Medicine Wards Progress Note     Resident Team: [facility] Medicine List 2  Attending Physician: Terry Boyd MD    Subjective:      Brief HPI:  Ran Reyes is a 70 yo AA M w a PMHx of HFrEF EF 30% with valvular insufficiency, HTN, PVD, HLD, CAD, and COPD who presents to [facility] ED on 24 with complaints of 1 day history of shortness of breath, dry cough, PND, and B/L leg swelling. Patient states his dry weight is 220lbs but was discharged from previous hospitalization above this weight and only lost 3lbs during this admission dated 24. Patient reports today's home weight around 240lbs. He additionally complains of B/L leg swelling as well as acute LLE pain. Denies hx of PE and DVTs. Denies increased wheezing, sputum production, and sputum color change. Reports medication compliance but cannot state which medications he takes at this time.     Upon ED arrival, VSS and afebrile. Lab work significant for moderate normocytic anemia with increased RDW. Add on Iron panel pending. BNP elevated 1343 with negative troponin. COVID/Flu negative. EKG consistent with rate controlled A fib and borderline Qtc prolongation, 493 msec. CXR consistent with vascular congestion and enlarged cardiac silhouette. Internal medicine consulted for acute exacerbation of HFrEF.     Interval History:   Patient output 3.6 L overnight after receiving 80 mg IV Lasix in the ED. Still having significant SOB and having asleep with head of bed straight up.  Reports baseline weight around 205 lb few months ago.  Denies chest pain.    Objective:     Last 24 Hour Vital Signs:  BP  Min: 106/65  Max: 127/94  Temp  Av.8 °F (36.6 °C)  Min: 97.3 °F (36.3 °C)  Max: 98.3 °F (36.8 °C)  Pulse  Av.4  Min: 64  Max: 100  Resp  Av.2  Min: 13  Max: 28  SpO2  Av %  Min: 92 %  Max: 100 %  Height  Av' (182.9 cm)  Min: 6' (182.9 cm)  Max: 6' (182.9 cm)  Weight  Av.6 kg (239 lb 5.6 oz)  Min: 107.7 kg (237 lb 6.4 oz)  Max:  109.5 kg (241 lb 4.7 oz)  I/O last 3 completed shifts:  In: -   Out: 3625 [Urine:3625]    Physical Examination:  Physical Exam  Vitals reviewed.   Constitutional:       Appearance: Normal appearance.   HENT:      Head: Normocephalic and atraumatic.      Mouth/Throat:      Mouth: Mucous membranes are moist.      Pharynx: Oropharynx is clear.   Eyes:      Conjunctiva/sclera: Conjunctivae normal.      Pupils: Pupils are equal, round, and reactive to light.   Neck:      Comments: JVD  Cardiovascular:      Rate and Rhythm: Normal rate. Rhythm irregular.      Heart sounds: No murmur heard.  Pulmonary:      Breath sounds: Wheezing and rales (Bilateral lower lobes) present.      Comments: Maintaining adequate O2 sats on 2 L nasal cannula, significant increased work of breathing during conversation.  Can not complete a whole sentence without becoming short of breath.  Abdominal:      General: Bowel sounds are normal. There is distension.      Palpations: Abdomen is soft.      Tenderness: There is no abdominal tenderness.   Musculoskeletal:      Right lower leg: Edema (2+ pitting thigh) present.      Left lower leg: Edema (2+ pitting to thighs) present.   Skin:     General: Skin is warm and dry.      Capillary Refill: Capillary refill takes less than 2 seconds.   Neurological:      Mental Status: He is alert.            Laboratory:  Most Recent Data:  CBC:   Lab Results   Component Value Date    WBC 6.15 12/17/2024    HGB 9.3 (L) 12/17/2024    HCT 29.0 (L) 12/17/2024     12/17/2024    MCV 86.1 12/17/2024    RDW 20.6 (H) 12/17/2024     WBC Differential:   Recent Labs   Lab 12/16/24  1348 12/17/24  0506   WBC 6.33 6.15   HGB 9.9* 9.3*   HCT 31.4* 29.0*    188   MCV 86.3 86.1     BMP:   Lab Results   Component Value Date     12/17/2024    K 3.3 (L) 12/17/2024     12/17/2024    CO2 27 12/17/2024    BUN 31.6 (H) 12/17/2024    CREATININE 1.24 12/17/2024    CALCIUM 9.0 12/17/2024    MG 2.10 12/17/2024     PHOS 4.1 12/17/2024     LFTs:   Lab Results   Component Value Date    ALBUMIN 3.7 12/17/2024    BILITOT 1.4 12/17/2024    AST 20 12/17/2024    ALKPHOS 113 12/17/2024    ALT 16 12/17/2024     Coags:   Lab Results   Component Value Date    INR 2.7 (H) 06/11/2024     FLP:   Lab Results   Component Value Date    CHOL 96 08/14/2024    HDL 32 (L) 08/14/2024    TRIG 73 08/14/2024     DM:   Lab Results   Component Value Date    HGBA1C 5.0 03/18/2024    HGBA1C 4.7 11/15/2021    HGBA1C 5.7 10/07/2020    CREATININE 1.24 12/17/2024     Thyroid:   Lab Results   Component Value Date    TSH 1.097 03/18/2024      Anemia:   Lab Results   Component Value Date    IRON 67 12/16/2024    TIBC 416 12/16/2024    FERRITIN 37.54 12/16/2024       Lab Results   Component Value Date    DMERZGIA07 1,078 (H) 03/18/2024       Lab Results   Component Value Date    FOLATE 10.0 03/18/2024        Cardiac:   Lab Results   Component Value Date    TROPONINI 0.021 12/16/2024    BNP 1,343.9 (H) 12/16/2024         Microbiology Data:  Microbiology Results (last 7 days)       ** No results found for the last 168 hours. **             Other Results:    Radiology:  Imaging Results              X-Ray Chest 1 View (Final result)  Result time 12/16/24 14:11:02      Final result by Lito Hurst MD (12/16/24 14:11:02)                   Impression:      No acute chest disease is identified..    Cardiomegaly      Electronically signed by: Lito Hurst  Date:    12/16/2024  Time:    14:11               Narrative:    EXAMINATION:  XR CHEST 1 VIEW    CLINICAL HISTORY:  shortness of breath;, .    FINDINGS:  No alveolar consolidation, effusion, or pneumothorax is seen.   The thoracic aorta is normal  cardiac silhouette is enlarged, central pulmonary vessels and mediastinum are normal in size and are grossly unremarkable.   visualized osseous structures are grossly unremarkable.                                      Current Medications:     Infusions:        Scheduled:   atorvastatin  80 mg Oral Daily    cetirizine  10 mg Oral Daily    clopidogreL  75 mg Oral Daily    folic acid  1 mg Oral Daily    metoprolol succinate  12.5 mg Oral Daily    nicotine  1 patch Transdermal Daily    pantoprazole  40 mg Oral BID    rivaroxaban  20 mg Oral Daily        PRN:    Current Facility-Administered Medications:     acetaminophen, 650 mg, Oral, Q4H PRN    albuterol-ipratropium, 3 mL, Nebulization, Q4H PRN    dextrose 50%, 12.5 g, Intravenous, PRN    dextrose 50%, 25 g, Intravenous, PRN    glucagon (human recombinant), 1 mg, Intramuscular, PRN    glucose, 16 g, Oral, PRN    glucose, 24 g, Oral, PRN    melatonin, 6 mg, Oral, Nightly PRN    naloxone, 0.02 mg, Intravenous, PRN    sodium chloride 0.9%, 10 mL, Intravenous, Q12H PRN    Assessment & Plan:     Acute Exacerbation of HFrEF (EF 30%)   Moderate MR, mild to moderate TR  Mildly elevated mean PASP, 44mmHg   HTN  A fib, rate controlled   Hx of CAD,HLD, PVD   - Dry weight : 205 lbs weight on admission: 241lbs    - BNP 1343, troponin negative   - S/p 1 x IV Lasix 80mg in ED  - Lasix 40 mg BID, adjust as needed  - Cardiac monitoring, daily weights, and strict I&Os   - Daily AM labs, keep K>4, Phos>3 and Mg>2, replete as needed   - Continue home Toprol 12.5mg qd and Xarelto 20mg qd   - Continue Plavix 75mg qd, Lipitor 80mg qd  - Holding Entresto for now due to patient reports of intolerance 2/2 hypotension      Acute LLE Pain   - STAT LLE U/S to rule out DVT ordered yesterday, not yet completed      Moderate Normocytic Anemia  - Elevated RDW, add on iron panel pending      Hx of COPD   - Does not appear to be in acute exacerbation of COPD at this time   - Minimal wheezing on exam and sating well on room air; does use home O2 as needed   - PRN Duonebs q 4hr     CODE STATUS: Full  Access: PIV  Antibiotics: none  Diet: Cardaic, Low Na, 1.5 L fluid restriction  DVT ppx: Xarelto  GI ppx: none  Fluids: none      Disposition:  Admitted for acute  on chronic CHF exacerbation currently undergoing diuresis.    Sharad Amin MD  [facility] - PGY-III

## 2024-12-17 NOTE — PLAN OF CARE
12/17/24 0830   Discharge Assessment   Assessment Type Discharge Planning Assessment   Confirmed/corrected address, phone number and insurance Yes   Confirmed Demographics Correct on Facesheet   Source of Information patient   When was your last doctors appointment?   (Abiodun Recinos)   Reason For Admission SOB, COPD exacerbation, Acute exacerbation CHF, Hypervolemia   People in Home alone   Facility Arrived From: home   Do you expect to return to your current living situation? Yes   Do you have help at home or someone to help you manage your care at home? Yes   Who are your caregiver(s) and their phone number(s)? Yesenia Reyes (Sister)  [phone]   Prior to hospitilization cognitive status: Alert/Oriented;No Deficits   Current cognitive status: No Deficits;Alert/Oriented   Walking or Climbing Stairs Difficulty yes   Walking or Climbing Stairs ambulation difficulty, requires equipment   Mobility Management Rollator   Dressing/Bathing Difficulty no   Home Accessibility wheelchair accessible   Home Layout Able to live on 1st floor   Equipment Currently Used at Home rollator   Readmission within 30 days? No   Patient currently being followed by outpatient case management? No   Do you currently have service(s) that help you manage your care at home? Yes   Name and Contact number of agency [facility]   Is the pt/caregiver preference to resume services with current agency No   Do you take prescription medications? Yes  ([facility])   Do you have prescription coverage? Yes   Coverage Yesenia Reyes (Sister)  [phone]   Do you have any problems affording any of your prescribed medications? No   Is the patient taking medications as prescribed? yes   Who is going to help you get home at discharge? family   How do you get to doctors appointments? family or friend will provide;health plan transportation   Are you on dialysis? No   Discharge Plan A Home with family;Home Health   DME Needed Upon Discharge   none   Discharge Plan discussed with: Patient   Transition of Care Barriers None   Physical Activity   On average, how many days per week do you engage in moderate to strenuous exercise (like a brisk walk)? 0 days   On average, how many minutes do you engage in exercise at this level? 0 min   Financial Resource Strain   How hard is it for you to pay for the very basics like food, housing, medical care, and heating? Not hard   Housing Stability   In the last 12 months, was there a time when you were not able to pay the mortgage or rent on time? N   At any time in the past 12 months, were you homeless or living in a shelter (including now)? N   Transportation Needs   Has the lack of transportation kept you from medical appointments, meetings, work or from getting things needed for daily living? No   Food Insecurity   Within the past 12 months, you worried that your food would run out before you got the money to buy more. Never true   Within the past 12 months, the food you bought just didn't last and you didn't have money to get more. Never true   Stress   Do you feel stress - tense, restless, nervous, or anxious, or unable to sleep at night because your mind is troubled all the time - these days? Not at all   Social Isolation   How often do you feel lonely or isolated from those around you?  Never   Alcohol Use   Q1: How often do you have a drink containing alcohol? 2-4 pr month   Q2: How many drinks containing alcohol do you have on a typical day when you are drinking? 1 or 2   Q3: How often do you have six or more drinks on one occasion? Never   Utilities   In the past 12 months has the electric, gas, oil, or water company threatened to shut off services in your home? No   Health Literacy   How often do you need to have someone help you when you read instructions, pamphlets, or other written material from your doctor or pharmacy? Never

## 2024-12-17 NOTE — PT/OT/SLP EVAL
Physical Therapy Evaluation    Patient Name:  Ran Reyes Jr.   MRN:  [id number]    Recommendations:     Therapy Intensity Recommendations at Discharge: Low Intensity Therapy  Discharge Equipment Recommendations: shower chair, rollator (patient has rollator walker)   Equipment to be obtained for discharge: shower chair.  Barriers to discharge: fall risk and decreased endurance    Assessment:     Ran Reyes Jr. is a 69 y.o. male admitted with a medical diagnosis of:  1. COPD exacerbation    2. Shortness of breath    3. Congestive heart failure, unspecified HF chronicity, unspecified heart failure type    4. Hypervolemia, unspecified hypervolemia type    5. Acute on chronic congestive heart failure, unspecified heart failure type    6. Acute exacerbation of CHF (congestive heart failure)    7. Chronic obstructive pulmonary disease, unspecified COPD type       Patient Active Problem List   Diagnosis    Primary open angle glaucoma (POAG) of right eye, moderate stage    Combined forms of age-related cataract of left eye    Arteriosclerosis of coronary artery    Chronic atrial fibrillation    Dyslipidemia    Hypertension    Tobacco use    PVD (peripheral vascular disease)    VHD (valvular heart disease)    COVID-19    HFrEF (heart failure with reduced ejection fraction)    Nonrheumatic mitral valve regurgitation    Postoperative eye state    Scrotal hematoma    Chronic obstructive pulmonary disease, unspecified    Lesion of external ear    Low back pain    Other thrombophilia    Secondary hyperaldosteronism    Positive colorectal cancer screening using Cologuard test    Acute heart failure    History of COPD    CHF exacerbation    Acute cystitis with hematuria      He presents with the following impairments/functional limitations:  impaired cardiopulmonary response to activity, impaired endurance, gait instability, pain.    Rehab Prognosis: Good.    Patient would benefit from continued skilled acute PT services  to: address above listed impairments/functional limitations; receive patient/caregiver education; reduce fall risk; and maximize independency/safety with functional mobility.    -continued: up-to-chair, ambulation, with progression of gait distance/frequency/duration and speed, as tolerated/appropriate, with assistance and supervision     Recent Surgery: * No surgery found *      Plan:     During this hospitalization, patient to be seen 5 x/week to address the identified impairments/functional limitations via gait training, therapeutic exercises and progress toward the established goals.    Plan of Care Expires:  01/14/25    Subjective     Communicated with patient's nurse Rose prior to session.    Patient agreeable to participate in evaluation.     Chief Complaint: bilat LE edema, SOB w/ activity and posterior head pain  Patient/Family Comments/goals: home w/ return to pre-admit PLOF  Pain/Comfort:  Pain Rating 1: 5/10  Location - Orientation 1: posterior  Location 1: head  Pain Addressed 1: Nurse notified, Cessation of Activity  Pain Rating Post-Intervention 1: 5/10    Patients cultural, spiritual, Pentecostal conflicts given the current situation: no    Social History  Living Environment: Patient lives alone in a first floor apartment, with no steps, with tub-shower combo.  Functional Level: Prior to admission patient was retired, was a passenger, was independent in ADL's and IADL's except transportation provided by patients sister or [facility] transportation, ambulated intermittently with assistive device.  Equipment Used at Home: rollator  Equipment owned (not currently used): none.  Assistance Upon Discharge:  sister .    Hand dominance: right    Patient denied lightheadedness/dizziness.  Patient complaints of extremity tingling/numbness.  Patient denied current swallowing difficulty.  Patient denied 'new' vision impairment.     Objective:     MAGDALENE Jeffers in room for evaluation    Patient found sitting in chair with  peripheral IV, oxygen upon PT entry to room.    General Precautions: Standard, fall   Orthopedic Precautions:N/A   Braces: N/A  Respiratory Status: 2 liters/min O2 via nasal cannula  SAT O2 Check: n/a    Vitals   At Rest (pre-session)  BP  118/69   HR  65   O2 Sat %  100     With Activity (post-session)  BP  116/67   HR  82   O2 Sat %  94     Exams:  Orientation: Patient is oriented to person, place, time, situation  Commands: Patient follows multi-step verbal commands  Fine Motor Coordination:     -     Intact: bilateral toe taps and bilateral heel taps  Sensation:    -     Impaired: light/touch distal bilat lower extremity  BILAT UE ROM/strength - defer to OT - see OT note for details  RLE ROM: WFL  RLE Strength: WFL  LLE ROM: WFL  LLE Strength: WFL    Functional Mobility:    Bed Mobility:  Scooting: modified independence  Sit to Supine: modified independence  with no cues required    Transfers:  Sit to Stand: modified independence with rolling walker  with no cues required    Gait:  Patient ambulated 130ft with rolling walker and stand by assistance.  Patient demonstrates :       steady gait       no loss of balance       no mis-steps       decreased susie       decreased bilateral step length       wide base of support       bilat LE hips external rotation       slowed, guarded, time consuming movements - all transitional activities  *additionally - slowed susie/bilat step length at end of gait session vs beginning    Other Mobility:  N/A    Balance:  Sit  Patient demonstrated static balance on level surface with modified independence with no verbal cues.  Patient demonstrated dynamic balance on level surface with modified independence with no verbal cues during moderate excursions.  Stand  Patient demonstrated static balance on level surface  using rolling walker with modified independence with no verbal cues.    Additional Treatment Session  N/A    Patient left supine in bed (per patient request), with HOB  elevated, and with bed rails up bilateral HOB with peripheral IV, oxygen with all lines intact, call button in reach, tray table at bedside, and patient's nurse notified.    Education     Patient educated on the importance of early mobility to prevent functional decline during hospital stay.  Patient educated on and assisted with functional mobility as noted above.  Patient educated on PT Plan of Care and role of PT in acute care.  Patient was instructed to utilize staff assistance for mobility/transfers.  White board updated regarding patient's safest level of mobility with staff assistance.    Goals     Multidisciplinary Problems       Physical Therapy Goals       Problem: Physical Therapy    Goal Priority Disciplines Outcome Interventions   Physical Therapy Goal     PT, PT/OT     Description: ESTABLISHED 12/17/2024  Goals to be met by: DISCHARGE  Patient will increase functional independence with mobility by performing:  -. Supine to sit with [place]  -. Sit to supine with [place]  -. Rolling to Left and Right with [place]  -. Sit to stand transfer with [place]  -. Gait  x 130ft x3 w/ sitting rest x4 minutes b/w sessions with Supervision using Rolling Walker           History:     Past Medical History:   Diagnosis Date    A-fib     Anticoagulant long-term use     Aortic aneurysm     Cataract     CHF (congestive heart failure)     Chronic atrial fibrillation     COPD (chronic obstructive pulmonary disease)     Coronary artery disease     HLD (hyperlipidemia)     Hypertension     Mitral regurgitation     PAD (peripheral artery disease)     Primary open angle glaucoma (POAG)      Past Surgical History:   Procedure Laterality Date    ANGIOGRAM, CORONARY, WITH LEFT HEART CATHETERIZATION N/A 3/26/2024    Procedure: Angiogram, Coronary, with Left Heart Cath;  Surgeon: Adriano Montiel MD;  Location: [facility] CATH LAB;  Service: Cardiology;  Laterality: N/A;    ATHERECTOMY OF PERIPHERAL VESSEL Left  09/12/2022    LEFT SFA ATHERECTOMY, BALLOON ANGIOPLASTY    CATARACT EXTRACTION W/  INTRAOCULAR LENS IMPLANT Left 3/9/2023    Procedure: EXTRACTION, CATARACT, WITH IOL INSERTION;  Surgeon: Georgi Borja MD;  Location: [facility];  Service: Ophthalmology;  Laterality: Left;  19.5  mac    EGD, WITH CLOSED BIOPSY  3/22/2024    Procedure: EGD, WITH CLOSED BIOPSY;  Surgeon: Ronald Calderon MD;  Location: [facility] ENDOSCOPY;  Service: Gastroenterology;;    ESOPHAGOGASTRODUODENOSCOPY N/A 3/22/2024    Procedure: EGD;  Surgeon: Ronald Calderon MD;  Location: [facility] ENDOSCOPY;  Service: Gastroenterology;  Laterality: N/A;    Heart Stent N/A     > 10yrs. AGO    INCISION AND DRAINAGE N/A 3/18/2024    Procedure: Incision and Drainage;  Surgeon: Fahad Rivas MD;  Location: [facility];  Service: Urology;  Laterality: N/A;  I&D SCROTAL ABSCESS    INSERTION OF STENT INTO PERIPHERAL VESSEL Right 10/17/2022    RIGHT SFA ATHERECTOMY, BALLOON ANGIOPLASTY, STENT; RIGHT ANTERIOR TIBIAL ARTERY ATHERECTOMY, BALLOON ANGIOPLASTY    ORCHIECTOMY Left 3/18/2024    Procedure: ORCHIECTOMY;  Surgeon: Fahad Rivas MD;  Location: [facility];  Service: Urology;  Laterality: Left;     Time Tracking:     PT Received On: 12/17/24  PT Start Time: 0936     PT Stop Time: 0959  PT Total Time (min): 23 min     Billable Minutes: Evaluation 23  Non-Billable Minutes: N/A  12/17/2024

## 2024-12-17 NOTE — PT/OT/SLP EVAL
Occupational Therapy   Evaluation and Discharge Note    Name: Ran Reyes Jr.  MRN: [id number]  Admitting Diagnosis: acute CHF exacerbation, BLE edema  Recent Surgery: * No surgery found *      Recommendations:     Discharge Recommendations: Low Intensity Therapy  Discharge Equipment Recommendations: shower chair  Barriers to discharge:  None    Assessment:     Ran Reyes Jr. is a 69 y.o. male with a medical diagnosis as noted above.   At this time, patient is limited only by SOB with activity along with systemic edema.  He is still able to complete all ADLs with extra time, and with supervision during ambulation in room for safety.  Pt does not require further acute OT services; PT will address deficits in endurance with standing/ambulating activities.    Plan:     During this hospitalization, patient does not require further acute OT services.  Please re-consult if situation changes.    Plan of Care Reviewed with: patient    Subjective     Chief Complaint: BLE edema, SOB with activity  Patient/Family Comments/goals: DC home when medically stable and endurance has returned to baseline    Occupational Profile:  Living Environment: 1st floor apartment alone, tub/shower combo, no steps  Previous level of function: I/mod I with Adls and mobility, uses rollator when needed for seated rest breaks d/t endurance deficits  Roles and Routines: pt cooks, cleans, does not drive, sister drives or [facility] transport van for MD appts  Equipment Used at home: rollator  Assistance upon Discharge: pt's sister    Pain/Comfort:  Pain Rating 1: 5/10  Location - Orientation 1: posterior  Location 1: head  Pain Addressed 1: Nurse notified, Cessation of Activity  Pain Rating Post-Intervention 1: 5/10    Patients cultural, spiritual, Yazdanism conflicts given the current situation: no    Objective:     Communicated with: nurse Rose prior to session.  Patient found up in chair with peripheral IV, oxygen upon OT entry to  room.    General Precautions: Standard, fall  Orthopedic Precautions: N/A  Braces: N/A  Respiratory Status: Nasal cannula, flow 2 L/min   Initial vital signs seated at chair:  /69, HR 65, O2 sat 100%  Final vital signs seated EOB after ambulating in [place]:  /67, HR 82, O2 sat 94%    Occupational Performance:    Bed Mobility:    Patient completed Sit to Supine with modified independence    Functional Mobility/Transfers:  Patient completed Sit <> Stand Transfer with modified independence  with  rolling walker   Functional Mobility: SBA to ambulate in [place] 130 ft, with very poor endurance and noted increased SOB and work of breathing, pt also c/o pain at back of head and neck with report of fall last week in a parking lot (stated he reported to ED and CT was clear, he was discharged home)    Activities of Daily Living:  Feeding:  independence .  Grooming: modified independence standing at sink but with decreased endurance for standing, required seated rest breaks for all standing activities, O2 sat above 94% throughout on 2L nasal cannula  Upper Body Dressing: modified independence seated EOB  Lower Body Dressing: modified independence seated at chair, with significantly increased time to complete tasks especially forward bending d/t abdominal edema and generalized edema along with decreased activity tolerance  Toileting: modified independence using urinal d/t nurse tracking all output and supervision for safety with transfer to toilet for moving bowels    Cognitive/Visual Perceptual:  Cognitive/Psychosocial Skills:     -       Oriented to: Person, Place, Time, and Situation   -       Follows Commands/attention:Follows multistep  commands  -       Safety awareness/insight to disability: intact   -       Mood/Affect/Coping skills/emotional control: Cooperative and Pleasant    Physical Exam:  BUE AROM WNL, strength 4+/5 bilaterally    Treatment & Education:  Pt. educated on POC, orientation to environment, use  of call bell for assist with transfers OOB or for any other needs due to fall risk.  Pt verbalized understanding.      Patient left HOB elevated with all lines intact, call button in reach, and nurse notified    GOALS:   Multidisciplinary Problems       Occupational Therapy Goals       Not on file                    History:     Past Medical History:   Diagnosis Date    A-fib     Anticoagulant long-term use     Aortic aneurysm     Cataract     CHF (congestive heart failure)     Chronic atrial fibrillation     COPD (chronic obstructive pulmonary disease)     Coronary artery disease     HLD (hyperlipidemia)     Hypertension     Mitral regurgitation     PAD (peripheral artery disease)     Primary open angle glaucoma (POAG)          Past Surgical History:   Procedure Laterality Date    ANGIOGRAM, CORONARY, WITH LEFT HEART CATHETERIZATION N/A 3/26/2024    Procedure: Angiogram, Coronary, with Left Heart Cath;  Surgeon: Adriano Montiel MD;  Location: [facility] CATH LAB;  Service: Cardiology;  Laterality: N/A;    ATHERECTOMY OF PERIPHERAL VESSEL Left 09/12/2022    LEFT SFA ATHERECTOMY, BALLOON ANGIOPLASTY    CATARACT EXTRACTION W/  INTRAOCULAR LENS IMPLANT Left 3/9/2023    Procedure: EXTRACTION, CATARACT, WITH IOL INSERTION;  Surgeon: Georgi Borja MD;  Location: [facility];  Service: Ophthalmology;  Laterality: Left;  19.5  mac    EGD, WITH CLOSED BIOPSY  3/22/2024    Procedure: EGD, WITH CLOSED BIOPSY;  Surgeon: Ronald Calderon MD;  Location: [facility] ENDOSCOPY;  Service: Gastroenterology;;    ESOPHAGOGASTRODUODENOSCOPY N/A 3/22/2024    Procedure: EGD;  Surgeon: Ronald Calderon MD;  Location: [facility] ENDOSCOPY;  Service: Gastroenterology;  Laterality: N/A;    Heart Stent N/A     > 10yrs. AGO    INCISION AND DRAINAGE N/A 3/18/2024    Procedure: Incision and Drainage;  Surgeon: Fahad Rivas MD;  Location: [facility];  Service: Urology;  Laterality: N/A;  I&D SCROTAL ABSCESS    INSERTION OF STENT INTO  PERIPHERAL VESSEL Right 10/17/2022    RIGHT SFA ATHERECTOMY, BALLOON ANGIOPLASTY, STENT; RIGHT ANTERIOR TIBIAL ARTERY ATHERECTOMY, BALLOON ANGIOPLASTY    ORCHIECTOMY Left 3/18/2024    Procedure: ORCHIECTOMY;  Surgeon: Fahad Rivas MD;  Location: [facility];  Service: Urology;  Laterality: Left;       Time Tracking:     OT Date of Treatment: 12/17/24  OT Start Time: 0940  OT Stop Time: 0959  OT Total Time (min): 19 min    Billable Minutes:Evaluation 19, low    12/17/2024

## 2024-12-17 NOTE — CONSULTS
Inpatient Nutrition Evaluation    Admit Date: 12/16/2024   Total duration of encounter: 1 day   Patient Age: 69 y.o.    Nutrition Recommendation/Prescription     Heart Healthy diet  Daily weight  Hypokalemia -- replace as needed    Nutrition Assessment     Chart Review    Reason Seen: continuous nutrition monitoring and physician consult for dietary recommendations    Malnutrition Screening Tool Results   Have you recently lost weight without trying?: No  Have you been eating poorly because of a decreased appetite?: No   MST Score: 0   Diagnosis:  Acute exacerbation of HFrEF, Moderate MR, Mild to moderate TF, HTN, Afib, Acute LLE pain, Normocytic Anemia    Relevant Medical History: HFrEF, HTN, PVD, HLD, CAD, COPD    Scheduled Medications:  atorvastatin, 80 mg, Daily  cetirizine, 10 mg, Daily  clopidogreL, 75 mg, Daily  folic acid, 1 mg, Daily  furosemide (LASIX) injection, 40 mg, Q12H  metoprolol succinate, 12.5 mg, Daily  nicotine, 1 patch, Daily  pantoprazole, 40 mg, BID  rivaroxaban, 20 mg, Daily    Continuous Infusions:   PRN Medications:   Current Facility-Administered Medications:     acetaminophen, 650 mg, Oral, Q4H PRN    albuterol-ipratropium, 3 mL, Nebulization, Q4H PRN    dextrose 50%, 12.5 g, Intravenous, PRN    dextrose 50%, 25 g, Intravenous, PRN    glucagon (human recombinant), 1 mg, Intramuscular, PRN    glucose, 16 g, Oral, PRN    glucose, 24 g, Oral, PRN    melatonin, 6 mg, Oral, Nightly PRN    naloxone, 0.02 mg, Intravenous, PRN    sodium chloride 0.9%, 10 mL, Intravenous, Q12H PRN    Recent Labs   Lab 12/16/24  1313 12/16/24  1348 12/17/24  0505 12/17/24  0506     --  142  --    K 4.1  --  3.3*  --    CALCIUM 9.3  --  9.0  --    PHOS  --   --  4.1  --    MG  --   --  2.10  --    CO2 24  --  27  --    BUN 24.0  --  31.6*  --    CREATININE 1.12  --  1.24  --    EGFRNORACEVR >60  --  >60  --    GLUCOSE 93  --  95  --    BILITOT 1.3  --  1.4  --    ALKPHOS 120  --  113  --    ALT 20  --  16  --     AST 30  --  20  --    ALBUMIN 4.0  --  3.7  --    WBC  --  6.33  --  6.15   HGB  --  9.9*  --  9.3*   HCT  --  31.4*  --  29.0*     Nutrition Orders:  Diet Heart Healthy Low Sodium,2gm; Fluid - 1500mL      Appetite/Oral Intake: good/% of meals  Factors Affecting Nutritional Intake: none identified  Social Needs Impacting Access to Food: none identified  Food/Anabaptist/Cultural Preferences: none reported  Food Allergies: no known food allergies  Last Bowel Movement: 24  Wound(s):  skin intact    Comments    24 -- Pt reports good appetite however dislike of foods, food preferences obtained with kitchen ; pt denies wt loss reporting gain, UBW appears to be ~205 lb per EMR wt history; encouraged & educated pt on low sodium diet for management of CHF; K (L) -- replace as needed    Anthropometrics    Height: 6' (182.9 cm), Height Method: Stated  Last Weight: 107.8 kg (237 lb 11.2 oz) (24 0712), Weight Method: Standard Scale  BMI (Calculated): 32.2  BMI Classification: obese grade I (BMI 30-34.9)        Ideal Body Weight (IBW), Male: 178 lb     % Ideal Body Weight, Male (lb): 135.56 %                 Usual Body Weight (UBW), k kg  % Usual Body Weight: 116.18     Usual Weight Provided By: EMR weight history    Wt Readings from Last 5 Encounters:   24 107.8 kg (237 lb 11.2 oz)   24 106 kg (233 lb 11 oz)   24 93.7 kg (206 lb 9.6 oz)   24 93 kg (205 lb 0.4 oz)   24 93.4 kg (206 lb)     Weight Change(s) Since Admission:   Wt Readings from Last 1 Encounters:   24 0712 107.8 kg (237 lb 11.2 oz)   24 1519 107.7 kg (237 lb 6.4 oz)   24 1205 109.5 kg (241 lb 4.7 oz)   Admit Weight: 109.5 kg (241 lb 4.7 oz) (24 1205), Weight Method: Standard Scale    Patient Education     Education Provided: low sodium diet  Teaching Method: explanation and printed materials  Comprehension: verbalizes understanding  Barriers to Learning: none  evident  Expected Compliance: fair  Comments: All questions were answered and dietitian's contact information was provided.     Nutrition Goals & Monitoring     Dietitian will monitor: food and beverage intake, weight, and food/nutrition knowledge skill  Discharge planning: continue heart healthy diet  Nutrition Risk/Follow-Up: low (follow-up in 5-7 days)  Patients assigned 'low nutrition risk' status do not qualify for a full nutritional assessment but will be monitored and re-evaluated in a 5-7 day time period. Please consult if re-evaluation needed sooner.

## 2024-12-18 LAB
ALBUMIN SERPL-MCNC: 3.6 G/DL (ref 3.4–4.8)
ALBUMIN/GLOB SERPL: 1.1 RATIO (ref 1.1–2)
ALP SERPL-CCNC: 112 UNIT/L (ref 40–150)
ALT SERPL-CCNC: 15 UNIT/L (ref 0–55)
ANION GAP SERPL CALC-SCNC: 8 MEQ/L
AST SERPL-CCNC: 18 UNIT/L (ref 5–34)
BASOPHILS # BLD AUTO: 0.01 X10(3)/MCL
BASOPHILS NFR BLD AUTO: 0.2 %
BILIRUB SERPL-MCNC: 1.3 MG/DL
BUN SERPL-MCNC: 25.7 MG/DL (ref 8.4–25.7)
CALCIUM SERPL-MCNC: 9.1 MG/DL (ref 8.8–10)
CHLORIDE SERPL-SCNC: 108 MMOL/L (ref 98–107)
CO2 SERPL-SCNC: 27 MMOL/L (ref 23–31)
CREAT SERPL-MCNC: 1.12 MG/DL (ref 0.72–1.25)
CREAT/UREA NIT SERPL: 23
EOSINOPHIL # BLD AUTO: 0.08 X10(3)/MCL (ref 0–0.9)
EOSINOPHIL NFR BLD AUTO: 1.3 %
ERYTHROCYTE [DISTWIDTH] IN BLOOD BY AUTOMATED COUNT: 20.8 % (ref 11.5–17)
GFR SERPLBLD CREATININE-BSD FMLA CKD-EPI: >60 ML/MIN/1.73/M2
GLOBULIN SER-MCNC: 3.2 GM/DL (ref 2.4–3.5)
GLUCOSE SERPL-MCNC: 99 MG/DL (ref 82–115)
HCT VFR BLD AUTO: 29.4 % (ref 42–52)
HGB BLD-MCNC: 9.2 G/DL (ref 14–18)
HOLD SPECIMEN: NORMAL
IMM GRANULOCYTES # BLD AUTO: 0.02 X10(3)/MCL (ref 0–0.04)
IMM GRANULOCYTES NFR BLD AUTO: 0.3 %
LYMPHOCYTES # BLD AUTO: 1.11 X10(3)/MCL (ref 0.6–4.6)
LYMPHOCYTES NFR BLD AUTO: 17.3 %
MAGNESIUM SERPL-MCNC: 1.9 MG/DL (ref 1.6–2.6)
MCH RBC QN AUTO: 27.4 PG (ref 27–31)
MCHC RBC AUTO-ENTMCNC: 31.3 G/DL (ref 33–36)
MCV RBC AUTO: 87.5 FL (ref 80–94)
MONOCYTES # BLD AUTO: 0.54 X10(3)/MCL (ref 0.1–1.3)
MONOCYTES NFR BLD AUTO: 8.4 %
NEUTROPHILS # BLD AUTO: 4.64 X10(3)/MCL (ref 2.1–9.2)
NEUTROPHILS NFR BLD AUTO: 72.5 %
NRBC BLD AUTO-RTO: 0 %
OHS QRS DURATION: 94 MS
OHS QTC CALCULATION: 493 MS
PHOSPHATE SERPL-MCNC: 3.5 MG/DL (ref 2.3–4.7)
PLATELET # BLD AUTO: 175 X10(3)/MCL (ref 130–400)
PMV BLD AUTO: 11.7 FL (ref 7.4–10.4)
POTASSIUM SERPL-SCNC: 3.9 MMOL/L (ref 3.5–5.1)
PROT SERPL-MCNC: 6.8 GM/DL (ref 5.8–7.6)
RBC # BLD AUTO: 3.36 X10(6)/MCL (ref 4.7–6.1)
SODIUM SERPL-SCNC: 143 MMOL/L (ref 136–145)
WBC # BLD AUTO: 6.4 X10(3)/MCL (ref 4.5–11.5)

## 2024-12-18 PROCEDURE — S4991 NICOTINE PATCH NONLEGEND: HCPCS

## 2024-12-18 PROCEDURE — 97116 GAIT TRAINING THERAPY: CPT

## 2024-12-18 PROCEDURE — 94640 AIRWAY INHALATION TREATMENT: CPT

## 2024-12-18 PROCEDURE — 85025 COMPLETE CBC W/AUTO DIFF WBC: CPT

## 2024-12-18 PROCEDURE — 84100 ASSAY OF PHOSPHORUS: CPT

## 2024-12-18 PROCEDURE — 80053 COMPREHEN METABOLIC PANEL: CPT

## 2024-12-18 PROCEDURE — 36415 COLL VENOUS BLD VENIPUNCTURE: CPT

## 2024-12-18 PROCEDURE — 21400001 HC TELEMETRY ROOM

## 2024-12-18 PROCEDURE — 25000003 PHARM REV CODE 250

## 2024-12-18 PROCEDURE — 94761 N-INVAS EAR/PLS OXIMETRY MLT: CPT

## 2024-12-18 PROCEDURE — 25000242 PHARM REV CODE 250 ALT 637 W/ HCPCS

## 2024-12-18 PROCEDURE — 83735 ASSAY OF MAGNESIUM: CPT

## 2024-12-18 PROCEDURE — 63600175 PHARM REV CODE 636 W HCPCS

## 2024-12-18 RX ORDER — SACUBITRIL AND VALSARTAN 24; 26 MG/1; MG/1
1 TABLET, FILM COATED ORAL 2 TIMES DAILY
Status: DISCONTINUED | OUTPATIENT
Start: 2024-12-18 | End: 2024-12-20 | Stop reason: HOSPADM

## 2024-12-18 RX ADMIN — POTASSIUM CHLORIDE 20 MEQ: 1500 TABLET, EXTENDED RELEASE ORAL at 10:12

## 2024-12-18 RX ADMIN — SACUBITRIL AND VALSARTAN 1 TABLET: 24; 26 TABLET, FILM COATED ORAL at 10:12

## 2024-12-18 RX ADMIN — IPRATROPIUM BROMIDE AND ALBUTEROL SULFATE 3 ML: .5; 3 SOLUTION RESPIRATORY (INHALATION) at 10:12

## 2024-12-18 RX ADMIN — FUROSEMIDE 40 MG: 10 INJECTION, SOLUTION INTRAMUSCULAR; INTRAVENOUS at 09:12

## 2024-12-18 RX ADMIN — METOPROLOL SUCCINATE 12.5 MG: 25 TABLET, EXTENDED RELEASE ORAL at 08:12

## 2024-12-18 RX ADMIN — POTASSIUM CHLORIDE 20 MEQ: 1500 TABLET, EXTENDED RELEASE ORAL at 08:12

## 2024-12-18 RX ADMIN — PANTOPRAZOLE SODIUM 40 MG: 40 TABLET, DELAYED RELEASE ORAL at 08:12

## 2024-12-18 RX ADMIN — RIVAROXABAN 20 MG: 10 TABLET, FILM COATED ORAL at 05:12

## 2024-12-18 RX ADMIN — IPRATROPIUM BROMIDE AND ALBUTEROL SULFATE 3 ML: .5; 3 SOLUTION RESPIRATORY (INHALATION) at 11:12

## 2024-12-18 RX ADMIN — IPRATROPIUM BROMIDE AND ALBUTEROL SULFATE 3 ML: .5; 3 SOLUTION RESPIRATORY (INHALATION) at 07:12

## 2024-12-18 RX ADMIN — ATORVASTATIN CALCIUM 80 MG: 40 TABLET, FILM COATED ORAL at 08:12

## 2024-12-18 RX ADMIN — IPRATROPIUM BROMIDE AND ALBUTEROL SULFATE 3 ML: .5; 3 SOLUTION RESPIRATORY (INHALATION) at 02:12

## 2024-12-18 RX ADMIN — CLOPIDOGREL BISULFATE 75 MG: 75 TABLET ORAL at 08:12

## 2024-12-18 RX ADMIN — PANTOPRAZOLE SODIUM 40 MG: 40 TABLET, DELAYED RELEASE ORAL at 10:12

## 2024-12-18 RX ADMIN — FOLIC ACID 1 MG: 1 TABLET ORAL at 08:12

## 2024-12-18 RX ADMIN — NICOTINE 1 PATCH: 14 PATCH, EXTENDED RELEASE TRANSDERMAL at 08:12

## 2024-12-18 RX ADMIN — CETIRIZINE HYDROCHLORIDE 10 MG: 10 TABLET, FILM COATED ORAL at 08:12

## 2024-12-18 RX ADMIN — SPIRONOLACTONE 50 MG: 25 TABLET ORAL at 08:12

## 2024-12-18 RX ADMIN — IPRATROPIUM BROMIDE AND ALBUTEROL SULFATE 3 ML: .5; 3 SOLUTION RESPIRATORY (INHALATION) at 12:12

## 2024-12-18 NOTE — PROGRESS NOTES
[facility] Medicine Wards Progress Note     Resident Team: [facility] Medicine List 2  Attending Physician: Terry Boyd MD    Subjective:      Brief HPI:  Ran Reyes is a 70 yo AA M w a PMHx of HFrEF EF 30% with valvular insufficiency, HTN, PVD, HLD, CAD, and COPD who presents to [facility] ED on 24 with complaints of 1 day history of shortness of breath, dry cough, PND, and B/L leg swelling. Patient states his dry weight is 220lbs but was discharged from previous hospitalization above this weight and only lost 3lbs during this admission dated 24. Patient reports today's home weight around 240lbs. He additionally complains of B/L leg swelling as well as acute LLE pain. Denies hx of PE and DVTs. Denies increased wheezing, sputum production, and sputum color change. Reports medication compliance but cannot state which medications he takes at this time.     Upon ED arrival, VSS and afebrile. Lab work significant for moderate normocytic anemia with increased RDW. Add on Iron panel pending. BNP elevated 1343 with negative troponin. COVID/Flu negative. EKG consistent with rate controlled A fib and borderline Qtc prolongation, 493 msec. CXR consistent with vascular congestion and enlarged cardiac silhouette. Internal medicine consulted for acute exacerbation of HFrEF.     Interval History:   Per nursing, reports of 15 beat PVCs overnight however patient was asymptomatic and sleeping at the time. May consider repeat Echo if VS decline. -130s otherwise VSS on 3L NC sating % and afebrile. Does not require this much oxygen. Patient resting in bed this AM, agrees with continued diuresis as his symptoms have improved but still feels he is retaining fluid. Receiving Lasix 40mg BID, urine output 3.9L and net I&Os -6.9L in last 24 hours. Current weight 236 lbs. Lab work unremarkable this AM.     Objective:     Last 24 Hour Vital Signs:  BP  Min: 103/77  Max: 131/84  Temp  Av °F (36.7 °C)  Min: 97.3 °F  (36.3 °C)  Max: 98.5 °F (36.9 °C)  Pulse  Av.2  Min: 51  Max: 92  Resp  Av.6  Min: 18  Max: 22  SpO2  Av.8 %  Min: 93 %  Max: 100 %  Weight  Av kg (236 lb)  Min: 107 kg (236 lb)  Max: 107 kg (236 lb)  I/O last 3 completed shifts:  In: 600 [P.O.:600]  Out: 3900 [Urine:3900]    Physical Examination:  Physical Exam  Vitals reviewed.   Constitutional:       Appearance: Normal appearance.   HENT:      Head: Normocephalic and atraumatic.      Mouth/Throat:      Mouth: Mucous membranes are moist.      Pharynx: Oropharynx is clear.   Eyes:      Conjunctiva/sclera: Conjunctivae normal.      Pupils: Pupils are equal, round, and reactive to light.   Neck:      Comments: JVD  Cardiovascular:      Rate and Rhythm: Normal rate. Rhythm irregular.      Heart sounds: No murmur heard.  Pulmonary:      Breath sounds: Wheezing and rales (Bilateral lower lobes) present.      Comments: Maintaining adequate O2 sats on 2 L nasal cannula, significant increased work of breathing during conversation.  Can not complete a whole sentence without becoming short of breath.  Abdominal:      General: Bowel sounds are normal. There is distension.      Palpations: Abdomen is soft.      Tenderness: There is no abdominal tenderness.   Musculoskeletal:      Right lower leg: Edema (2+ pitting thigh) present.      Left lower leg: Edema (2+ pitting to thighs) present.   Skin:     General: Skin is warm and dry.      Capillary Refill: Capillary refill takes less than 2 seconds.   Neurological:      Mental Status: He is alert.            Laboratory:  Most Recent Data:  CBC:   Lab Results   Component Value Date    WBC 6.40 2024    HGB 9.2 (L) 2024    HCT 29.4 (L) 2024     2024    MCV 87.5 2024    RDW 20.8 (H) 2024     WBC Differential:   Recent Labs   Lab 24  1348 24  0506 24  0402   WBC 6.33 6.15 6.40   HGB 9.9* 9.3* 9.2*   HCT 31.4* 29.0* 29.4*    188 175   MCV 86.3 86.1  87.5     BMP:   Lab Results   Component Value Date     12/18/2024    K 3.9 12/18/2024     (H) 12/18/2024    CO2 27 12/18/2024    BUN 25.7 12/18/2024    CREATININE 1.12 12/18/2024    CALCIUM 9.1 12/18/2024    MG 1.90 12/18/2024    PHOS 3.5 12/18/2024     LFTs:   Lab Results   Component Value Date    ALBUMIN 3.6 12/18/2024    BILITOT 1.3 12/18/2024    AST 18 12/18/2024    ALKPHOS 112 12/18/2024    ALT 15 12/18/2024     Coags:   Lab Results   Component Value Date    INR 2.7 (H) 06/11/2024     FLP:   Lab Results   Component Value Date    CHOL 96 08/14/2024    HDL 32 (L) 08/14/2024    TRIG 73 08/14/2024     DM:   Lab Results   Component Value Date    HGBA1C 5.0 03/18/2024    HGBA1C 4.7 11/15/2021    HGBA1C 5.7 10/07/2020    CREATININE 1.12 12/18/2024     Thyroid:   Lab Results   Component Value Date    TSH 1.097 03/18/2024      Anemia:   Lab Results   Component Value Date    IRON 67 12/16/2024    TIBC 416 12/16/2024    FERRITIN 37.54 12/16/2024       Lab Results   Component Value Date    [id number] 1,078 (H) 03/18/2024       Lab Results   Component Value Date    FOLATE 10.0 03/18/2024        Cardiac:   Lab Results   Component Value Date    TROPONINI 0.021 12/16/2024    BNP 1,343.9 (H) 12/16/2024         Microbiology Data:  Microbiology Results (last 7 days)       ** No results found for the last 168 hours. **             Other Results:    Radiology:  Imaging Results              X-Ray Chest 1 View (Final result)  Result time 12/16/24 14:11:02      Final result by Lito Hurst MD (12/16/24 14:11:02)                   Impression:      No acute chest disease is identified..    Cardiomegaly      Electronically signed by: Lito Hurst  Date:    12/16/2024  Time:    14:11               Narrative:    EXAMINATION:  XR CHEST 1 VIEW    CLINICAL HISTORY:  shortness of breath;, .    FINDINGS:  No alveolar consolidation, effusion, or pneumothorax is seen.   The thoracic aorta is normal  cardiac silhouette is  enlarged, central pulmonary vessels and mediastinum are normal in size and are grossly unremarkable.   visualized osseous structures are grossly unremarkable.                                      Current Medications:     Infusions:       Scheduled:   atorvastatin  80 mg Oral Daily    cetirizine  10 mg Oral Daily    clopidogreL  75 mg Oral Daily    folic acid  1 mg Oral Daily    furosemide (LASIX) injection  40 mg Intravenous Q12H    metoprolol succinate  12.5 mg Oral Daily    nicotine  1 patch Transdermal Daily    pantoprazole  40 mg Oral BID    potassium chloride SA  20 mEq Oral BID    rivaroxaban  20 mg Oral Daily    spironolactone  50 mg Oral Daily        PRN:    Current Facility-Administered Medications:     acetaminophen, 650 mg, Oral, Q4H PRN    albuterol-ipratropium, 3 mL, Nebulization, Q4H PRN    dextrose 50%, 12.5 g, Intravenous, PRN    dextrose 50%, 25 g, Intravenous, PRN    glucagon (human recombinant), 1 mg, Intramuscular, PRN    glucose, 16 g, Oral, PRN    glucose, 24 g, Oral, PRN    melatonin, 6 mg, Oral, Nightly PRN    naloxone, 0.02 mg, Intravenous, PRN    sodium chloride 0.9%, 10 mL, Intravenous, Q12H PRN    Assessment & Plan:     Acute Exacerbation of HFrEF (EF 30%) s/p ICD placement   Moderate MR, mild to moderate TR  Mildly elevated mean PASP, 44mmHg   HTN  A fib, no Xarelto and rate controlled   Hx of CAD s/p stenting and HLD  - Dry weight : 209 lb 3.5 oz on hospital discharged dated 8/18/24, weight on current admission: 241lbs, today's weight: 236 lbs.    - BNP 1343, troponin negative   - S/p 1 x IV Lasix 80mg in ED, continue Lasix 40 mg BID as he is diuresing well, to be reassessed daily   - Cardiac monitoring, daily weights, and strict I&Os   - Daily AM labs, keep K>4, Phos>3 and Mg>2, replete as needed   - Continue home Aldactone 50mg qd, Toprol 12.5mg qd and Xarelto 20mg qd   - SBPs above goal in last 24 hours, will trial restarting Entresto 24-26 mg BID and monitor vitals       Acute LLE  Pain - improving   Suspected PVD   Hx of PAD s/p R SFA atherectomy and stenting 2022    - STAT LLE U/S negative for DVT   - Also suspect peripheral venous insufficiency, has not followed with Vascular Surgery recently   - Likely will need outpatient BLE venous insufficiency ultrasound on discharge to assess this and re-initiate routine follow-up with Vascular surgery   - BLE arterial ultrasound done on 12/6/24 consistent with bilateral moderate stenosis with areas of monophasic flow   - Continue Plavix 75mg qd and Lipitor 80mg qd     Moderate Normocytic Anemia  Iron deficiency   - H/H stable 9.2/29.4  - Iron saturation 16, Transferrin 386, TIBC 416, consider initiating PO iron supplementation      Hx of COPD   - Does not appear to be in acute exacerbation of COPD at this time   - Minimal wheezing on exam and sating well on room air; does use home O2 as needed   - PRN Duonebs q 4hr   - SpO2 goal 88-92%, avoid over-oxygenation as this can lead to respiratory failure     CODE STATUS: Full  Access: PIV  Antibiotics: none  Diet: Cardaic, Low Na, 1.5 L fluid restriction  DVT ppx: Xarelto  GI ppx: none  Fluids: none      Disposition:  Admitted for acute on chronic CHF exacerbation currently undergoing diuresis and responding well to Lasix BID dosing. Last known documented dry weight: 209lbs, today's weight: 236lbs. Continue to monitor.     Tonja Layne MD   [facility]

## 2024-12-18 NOTE — PT/OT/SLP PROGRESS
Physical Therapy Treatment    Patient Name:  Ran Reyes Jr.   MRN:  [id number]    Recommendations     Therapy Intensity Recommendations at Discharge: Low Intensity Therapy  Discharge Equipment Recommendations: shower chair, rollator (has rollator walker)   Barriers to discharge: fall risk and decreased endurance    Assessment     Ran Reyes Jr. is a 69 y.o. male admitted with a medical diagnosis of:  1. COPD exacerbation    2. Shortness of breath    3. Congestive heart failure, unspecified HF chronicity, unspecified heart failure type    4. Hypervolemia, unspecified hypervolemia type    5. Acute on chronic congestive heart failure, unspecified heart failure type    6. Acute exacerbation of CHF (congestive heart failure)    7. Chronic obstructive pulmonary disease, unspecified COPD type    8. History of COPD       Patient Active Problem List   Diagnosis    Primary open angle glaucoma (POAG) of right eye, moderate stage    Combined forms of age-related cataract of left eye    Arteriosclerosis of coronary artery    Chronic atrial fibrillation    Dyslipidemia    Hypertension    Tobacco use    PVD (peripheral vascular disease)    VHD (valvular heart disease)    COVID-19    HFrEF (heart failure with reduced ejection fraction)    Nonrheumatic mitral valve regurgitation    Postoperative eye state    Scrotal hematoma    Chronic obstructive pulmonary disease, unspecified    Lesion of external ear    Low back pain    Other thrombophilia    Secondary hyperaldosteronism    Positive colorectal cancer screening using Cologuard test    Acute heart failure    History of COPD    CHF exacerbation    Acute cystitis with hematuria      He presents with the following impairments/functional limitations:  impaired cardiopulmonary response to activity, impaired endurance, gait instability, pain.    Rehab Prognosis: Good.    Patient would benefit from continued skilled acute PT services to: address above listed  impairments/functional limitations; receive patient/caregiver education; reduce fall risk; and maximize independency/safety with functional mobility.    -continued: up-to-chair, ambulation, with progression of gait distance/frequency/duration and speed, as tolerated/appropriate, with assistance and supervision     Recent Surgery: * No surgery found *      Plan     During this hospitalization, patient to be seen 5 x/week to address the identified impairments/functional limitations via gait training, therapeutic exercises and progress toward the established goals.    Plan of Care Expires:  01/14/25    Subjective     Communicated with patient's nurse Rose prior to session.    Patient agreeable to participate in treatment session.    Chief Complaint: posterior head/upper neck pain w/ activity  Patient/Family Comments/goals: home  Pain/Comfort:  Pain Rating 1: 0/10  Location - Orientation 1: posterior  Location 1:  (head and upper neck)  Pain Addressed 1: Nurse notified, Cessation of Activity  Pain Rating Post-Intervention 1: 5/10    Objective     Patient found sitting in chair with peripheral IV, oxygen  upon PT entry to room.    General Precautions: Standard, fall   Orthopedic Precautions:N/A   Braces: N/A  Respiratory Status: 2 liters/min O2 via nasal cannula  SAT O2 Check: n/a    Functional Mobility:    Bed Mobility:  Seated in bedside chair at start of session and left in bedside chair at end of session    Transfers:  Sit to Stand: modified independence with no assistive device  Stand to Sit: modified independence with no assistive device  with no cues required    Gait:  Patient ambulated 130ft  At Rest (pre-session)  HR 76   O2 Sat % 100   Sitting rest x4 minutes (CNA into room during 1st gait session to change bed sheets)  Patient ambulated 260ft  At Rest (pre-session)  HR 79   O2 Sat % 99   with rolling walker and supervision-stand by assistance.  Patient demonstrates :       steady gait       no loss of  balance       no mis-steps       decreased susie       decreased bilateral step length       wide base of support       bilat LE hips external rotation       slowed, guarded, time consuming movements - all transitional activities    Other Mobility:  N/A    Balance:  Sit  Patient demonstrated static balance on level surface with independence with no verbal cues.  Patient demonstrated dynamic balance on level surface with modified independence with no verbal cues during moderate excursions.  Stand  Patient demonstrated static balance on level surface  using rolling walker with modified independence with no verbal cues.    Patient left sitting in chair with peripheral IV, oxygen with all lines intact, call button in reach, tray table at bedside, patient's nurse notified, and CNA present.    Education     Patient educated on and assisted with functional mobility as noted above.  Patient educated on PT Plan of Care  Patient was instructed to utilize staff assistance for mobility/transfers.  White board updated regarding patient's safest level of mobility with staff assistance.    Goals     Multidisciplinary Problems       Physical Therapy Goals       Problem: Physical Therapy    Goal Priority Disciplines Outcome Interventions   Physical Therapy Goal     PT, PT/OT Progressing    Description: REVIEWED 12/18/2024  Goals to be met by: DISCHARGE  Patient will increase functional independence with mobility by performing:  -. Supine to sit with [place] - ONGOING  -. Sit to supine with [place] - ONGOING  -. Rolling to Left and Right with [place] - ONGOING  -. Sit to stand transfer with Jeff [place] - ONGOING  -. Gait  x 130ft x3 w/ sitting rest x4 minutes b/w sessions with Supervision using Rolling Walker - ONGOING           Time Tracking     PT Received On: 12/18/24  PT Start Time: 0846     PT Stop Time: 0914  PT Total Time (min): 28 min     Billable Minutes: Gait Training 28  Non-Billable Minutes:  N/A  12/18/2024

## 2024-12-19 LAB
ALBUMIN SERPL-MCNC: 3.8 G/DL (ref 3.4–4.8)
ALBUMIN/GLOB SERPL: 1.2 RATIO (ref 1.1–2)
ALP SERPL-CCNC: 111 UNIT/L (ref 40–150)
ALT SERPL-CCNC: 14 UNIT/L (ref 0–55)
ANION GAP SERPL CALC-SCNC: 6 MEQ/L
AST SERPL-CCNC: 18 UNIT/L (ref 5–34)
BASOPHILS # BLD AUTO: 0.01 X10(3)/MCL
BASOPHILS NFR BLD AUTO: 0.2 %
BILIRUB SERPL-MCNC: 1.9 MG/DL
BUN SERPL-MCNC: 25.4 MG/DL (ref 8.4–25.7)
CALCIUM SERPL-MCNC: 9.6 MG/DL (ref 8.8–10)
CHLORIDE SERPL-SCNC: 106 MMOL/L (ref 98–107)
CO2 SERPL-SCNC: 27 MMOL/L (ref 23–31)
CREAT SERPL-MCNC: 1.02 MG/DL (ref 0.72–1.25)
CREAT/UREA NIT SERPL: 25
EOSINOPHIL # BLD AUTO: 0.09 X10(3)/MCL (ref 0–0.9)
EOSINOPHIL NFR BLD AUTO: 1.4 %
ERYTHROCYTE [DISTWIDTH] IN BLOOD BY AUTOMATED COUNT: 20.7 % (ref 11.5–17)
GFR SERPLBLD CREATININE-BSD FMLA CKD-EPI: >60 ML/MIN/1.73/M2
GLOBULIN SER-MCNC: 3.3 GM/DL (ref 2.4–3.5)
GLUCOSE SERPL-MCNC: 98 MG/DL (ref 82–115)
HCT VFR BLD AUTO: 29.4 % (ref 42–52)
HGB BLD-MCNC: 9.4 G/DL (ref 14–18)
IMM GRANULOCYTES # BLD AUTO: 0.02 X10(3)/MCL (ref 0–0.04)
IMM GRANULOCYTES NFR BLD AUTO: 0.3 %
LYMPHOCYTES # BLD AUTO: 1.14 X10(3)/MCL (ref 0.6–4.6)
LYMPHOCYTES NFR BLD AUTO: 17.9 %
MAGNESIUM SERPL-MCNC: 1.9 MG/DL (ref 1.6–2.6)
MCH RBC QN AUTO: 27.4 PG (ref 27–31)
MCHC RBC AUTO-ENTMCNC: 32 G/DL (ref 33–36)
MCV RBC AUTO: 85.7 FL (ref 80–94)
MONOCYTES # BLD AUTO: 0.61 X10(3)/MCL (ref 0.1–1.3)
MONOCYTES NFR BLD AUTO: 9.6 %
NEUTROPHILS # BLD AUTO: 4.51 X10(3)/MCL (ref 2.1–9.2)
NEUTROPHILS NFR BLD AUTO: 70.6 %
NRBC BLD AUTO-RTO: 0 %
PHOSPHATE SERPL-MCNC: 3.4 MG/DL (ref 2.3–4.7)
PLATELET # BLD AUTO: 155 X10(3)/MCL (ref 130–400)
PMV BLD AUTO: 10.7 FL (ref 7.4–10.4)
POTASSIUM SERPL-SCNC: 3.7 MMOL/L (ref 3.5–5.1)
PROT SERPL-MCNC: 7.1 GM/DL (ref 5.8–7.6)
RBC # BLD AUTO: 3.43 X10(6)/MCL (ref 4.7–6.1)
SODIUM SERPL-SCNC: 139 MMOL/L (ref 136–145)
WBC # BLD AUTO: 6.38 X10(3)/MCL (ref 4.5–11.5)

## 2024-12-19 PROCEDURE — 25000242 PHARM REV CODE 250 ALT 637 W/ HCPCS

## 2024-12-19 PROCEDURE — 97116 GAIT TRAINING THERAPY: CPT

## 2024-12-19 PROCEDURE — 36415 COLL VENOUS BLD VENIPUNCTURE: CPT

## 2024-12-19 PROCEDURE — 83735 ASSAY OF MAGNESIUM: CPT

## 2024-12-19 PROCEDURE — 94640 AIRWAY INHALATION TREATMENT: CPT

## 2024-12-19 PROCEDURE — 63600175 PHARM REV CODE 636 W HCPCS

## 2024-12-19 PROCEDURE — 25000003 PHARM REV CODE 250

## 2024-12-19 PROCEDURE — S4991 NICOTINE PATCH NONLEGEND: HCPCS

## 2024-12-19 PROCEDURE — 85025 COMPLETE CBC W/AUTO DIFF WBC: CPT

## 2024-12-19 PROCEDURE — 21400001 HC TELEMETRY ROOM

## 2024-12-19 PROCEDURE — 80053 COMPREHEN METABOLIC PANEL: CPT

## 2024-12-19 PROCEDURE — 84100 ASSAY OF PHOSPHORUS: CPT

## 2024-12-19 PROCEDURE — 27000221 HC OXYGEN, UP TO 24 HOURS

## 2024-12-19 RX ADMIN — IPRATROPIUM BROMIDE AND ALBUTEROL SULFATE 3 ML: .5; 3 SOLUTION RESPIRATORY (INHALATION) at 03:12

## 2024-12-19 RX ADMIN — PANTOPRAZOLE SODIUM 40 MG: 40 TABLET, DELAYED RELEASE ORAL at 08:12

## 2024-12-19 RX ADMIN — NICOTINE 1 PATCH: 14 PATCH, EXTENDED RELEASE TRANSDERMAL at 08:12

## 2024-12-19 RX ADMIN — IPRATROPIUM BROMIDE AND ALBUTEROL SULFATE 3 ML: .5; 3 SOLUTION RESPIRATORY (INHALATION) at 07:12

## 2024-12-19 RX ADMIN — CLOPIDOGREL BISULFATE 75 MG: 75 TABLET ORAL at 08:12

## 2024-12-19 RX ADMIN — CETIRIZINE HYDROCHLORIDE 10 MG: 10 TABLET, FILM COATED ORAL at 08:12

## 2024-12-19 RX ADMIN — FOLIC ACID 1 MG: 1 TABLET ORAL at 08:12

## 2024-12-19 RX ADMIN — SPIRONOLACTONE 50 MG: 25 TABLET ORAL at 08:12

## 2024-12-19 RX ADMIN — SACUBITRIL AND VALSARTAN 1 TABLET: 24; 26 TABLET, FILM COATED ORAL at 08:12

## 2024-12-19 RX ADMIN — METOPROLOL SUCCINATE 12.5 MG: 25 TABLET, EXTENDED RELEASE ORAL at 08:12

## 2024-12-19 RX ADMIN — RIVAROXABAN 20 MG: 10 TABLET, FILM COATED ORAL at 04:12

## 2024-12-19 RX ADMIN — FUROSEMIDE 40 MG: 10 INJECTION, SOLUTION INTRAMUSCULAR; INTRAVENOUS at 08:12

## 2024-12-19 RX ADMIN — IPRATROPIUM BROMIDE AND ALBUTEROL SULFATE 3 ML: .5; 3 SOLUTION RESPIRATORY (INHALATION) at 10:12

## 2024-12-19 RX ADMIN — POTASSIUM CHLORIDE 20 MEQ: 1500 TABLET, EXTENDED RELEASE ORAL at 08:12

## 2024-12-19 RX ADMIN — ATORVASTATIN CALCIUM 80 MG: 40 TABLET, FILM COATED ORAL at 08:12

## 2024-12-19 NOTE — PROGRESS NOTES
[facility] Medicine Wards Progress Note     Resident Team: [facility] Medicine List 2  Attending Physician: Terry Boyd MD    Subjective:      Brief HPI:  Ran Reyes is a 70 yo AA M w a PMHx of HFrEF EF 30% with valvular insufficiency, HTN, PVD, HLD, CAD, and COPD who presents to [facility] ED on 24 with complaints of 1 day history of shortness of breath, dry cough, PND, and B/L leg swelling. Patient states his dry weight is 220lbs but was discharged from previous hospitalization above this weight and only lost 3lbs during this admission dated 24. Patient reports today's home weight around 240lbs. He additionally complains of B/L leg swelling as well as acute LLE pain. Denies hx of PE and DVTs. Denies increased wheezing, sputum production, and sputum color change. Reports medication compliance but cannot state which medications he takes at this time.     Upon ED arrival, VSS and afebrile. Lab work significant for moderate normocytic anemia with increased RDW. Add on Iron panel pending. BNP elevated 1343 with negative troponin. COVID/Flu negative. EKG consistent with rate controlled A fib and borderline Qtc prolongation, 493 msec. CXR consistent with vascular congestion and enlarged cardiac silhouette. Internal medicine consulted for acute exacerbation of HFrEF.     Interval History:   Patient weight 137 kg from 136 kg yesterday despite output of 3.7 L over the past 24 hours. Suspect fluid intake is above recommendations of 1.5 L. Still has LE swelling to mid shins but breathing is significantly improved. No longer getting SOB during conversation. Wheezing is minimal. Labs today not yet collected.     Objective:     Last 24 Hour Vital Signs:  BP  Min: 87/59  Max: 131/84  Temp  Av °F (36.7 °C)  Min: 97.6 °F (36.4 °C)  Max: 98.5 °F (36.9 °C)  Pulse  Av.8  Min: 57  Max: 95  Resp  Av.7  Min: 16  Max: 24  SpO2  Av.3 %  Min: 86 %  Max: 100 %  I/O last 3 completed shifts:  In: 822  [P.O.:822]  Out: 5050 [Urine:5050]    Physical Examination:  Physical Exam  Vitals reviewed.   Constitutional:       Appearance: Normal appearance.   HENT:      Head: Normocephalic and atraumatic.      Mouth/Throat:      Mouth: Mucous membranes are moist.      Pharynx: Oropharynx is clear.   Eyes:      Conjunctiva/sclera: Conjunctivae normal.      Pupils: Pupils are equal, round, and reactive to light.   Neck:      Comments: JVD  Cardiovascular:      Rate and Rhythm: Normal rate. Rhythm irregular.      Heart sounds: No murmur heard.  Pulmonary:      Breath sounds: Wheezing (mild end-expiratory, intermittent) present. No rales (Bilateral lower lobes).      Comments: Good air movement throughout with mild wheezing. Conversational SOB significantly improved.   Abdominal:      General: Bowel sounds are normal. There is distension.      Palpations: Abdomen is soft.      Tenderness: There is no abdominal tenderness.   Musculoskeletal:      Right lower leg: Edema (1+ pitting to mid shins) present.      Left lower leg: Edema (1+ pitting to mid shins) present.   Skin:     General: Skin is warm and dry.      Capillary Refill: Capillary refill takes less than 2 seconds.   Neurological:      Mental Status: He is alert.            Laboratory:  Most Recent Data:  CBC:   Lab Results   Component Value Date    WBC 6.40 12/18/2024    HGB 9.2 (L) 12/18/2024    HCT 29.4 (L) 12/18/2024     12/18/2024    MCV 87.5 12/18/2024    RDW 20.8 (H) 12/18/2024     WBC Differential:   Recent Labs   Lab 12/16/24  1348 12/17/24  0506 12/18/24  0402   WBC 6.33 6.15 6.40   HGB 9.9* 9.3* 9.2*   HCT 31.4* 29.0* 29.4*    188 175   MCV 86.3 86.1 87.5     BMP:   Lab Results   Component Value Date     12/18/2024    K 3.9 12/18/2024     (H) 12/18/2024    CO2 27 12/18/2024    BUN 25.7 12/18/2024    CREATININE 1.12 12/18/2024    CALCIUM 9.1 12/18/2024    MG 1.90 12/18/2024    PHOS 3.5 12/18/2024     LFTs:   Lab Results   Component  Value Date    ALBUMIN 3.6 12/18/2024    BILITOT 1.3 12/18/2024    AST 18 12/18/2024    ALKPHOS 112 12/18/2024    ALT 15 12/18/2024     Coags:   Lab Results   Component Value Date    INR 2.7 (H) 06/11/2024     FLP:   Lab Results   Component Value Date    CHOL 96 08/14/2024    HDL 32 (L) 08/14/2024    TRIG 73 08/14/2024     DM:   Lab Results   Component Value Date    HGBA1C 5.0 03/18/2024    HGBA1C 4.7 11/15/2021    HGBA1C 5.7 10/07/2020    CREATININE 1.12 12/18/2024     Thyroid:   Lab Results   Component Value Date    TSH 1.097 03/18/2024      Anemia:   Lab Results   Component Value Date    IRON 67 12/16/2024    TIBC 416 12/16/2024    FERRITIN 37.54 12/16/2024       Lab Results   Component Value Date    [id number] 1,078 (H) 03/18/2024       Lab Results   Component Value Date    FOLATE 10.0 03/18/2024        Cardiac:   Lab Results   Component Value Date    TROPONINI 0.021 12/16/2024    BNP 1,343.9 (H) 12/16/2024         Microbiology Data:  Microbiology Results (last 7 days)       ** No results found for the last 168 hours. **             Other Results:    Radiology:  Imaging Results              X-Ray Chest 1 View (Final result)  Result time 12/16/24 14:11:02      Final result by Lito Hurst MD (12/16/24 14:11:02)                   Impression:      No acute chest disease is identified..    Cardiomegaly      Electronically signed by: Lito Hurst  Date:    12/16/2024  Time:    14:11               Narrative:    EXAMINATION:  XR CHEST 1 VIEW    CLINICAL HISTORY:  shortness of breath;, .    FINDINGS:  No alveolar consolidation, effusion, or pneumothorax is seen.   The thoracic aorta is normal  cardiac silhouette is enlarged, central pulmonary vessels and mediastinum are normal in size and are grossly unremarkable.   visualized osseous structures are grossly unremarkable.                                      Current Medications:     Infusions:       Scheduled:   atorvastatin  80 mg Oral Daily    cetirizine  10  mg Oral Daily    clopidogreL  75 mg Oral Daily    folic acid  1 mg Oral Daily    furosemide (LASIX) injection  40 mg Intravenous Q12H    metoprolol succinate  12.5 mg Oral Daily    nicotine  1 patch Transdermal Daily    pantoprazole  40 mg Oral BID    potassium chloride SA  20 mEq Oral BID    rivaroxaban  20 mg Oral Daily    sacubitriL-valsartan  1 tablet Oral BID    spironolactone  50 mg Oral Daily        PRN:    Current Facility-Administered Medications:     acetaminophen, 650 mg, Oral, Q4H PRN    albuterol-ipratropium, 3 mL, Nebulization, Q4H PRN    dextrose 50%, 12.5 g, Intravenous, PRN    dextrose 50%, 25 g, Intravenous, PRN    glucagon (human recombinant), 1 mg, Intramuscular, PRN    glucose, 16 g, Oral, PRN    glucose, 24 g, Oral, PRN    melatonin, 6 mg, Oral, Nightly PRN    naloxone, 0.02 mg, Intravenous, PRN    sodium chloride 0.9%, 10 mL, Intravenous, Q12H PRN    Assessment & Plan:     Acute Exacerbation of HFrEF (EF 30%) s/p ICD placement   Moderate MR, mild to moderate TR  Mildly elevated mean PASP, 44mmHg   HTN  A fib, no Xarelto and rate controlled   Hx of CAD s/p stenting and HLD  - Dry weight : 209 lb 3.5 oz on hospital discharged dated 8/18/24, weight on current admission: 241lbs, today's weight: 236 lbs.    - BNP 1343, troponin negative   - S/p 1 x IV Lasix 80mg in ED, continue Lasix 40 mg BID as he is diuresing well, to be reassessed daily   - Cardiac monitoring, daily weights, and strict I&Os   - Daily AM labs, keep K>4, Phos>3 and Mg>2, replete as needed   - Continue home Aldactone 50mg qd, Toprol 12.5mg qd and Xarelto 20mg qd   - SBPs above goal in last 24 hours, will trial restarting Entresto 24-26 mg BID and monitor vitals       Acute LLE Pain - improving   Suspected PVD   Hx of PAD s/p R SFA atherectomy and stenting 2022    - STAT LLE U/S negative for DVT   - Also suspect peripheral venous insufficiency, has not followed with Vascular Surgery recently   - Likely will need outpatient BLE  venous insufficiency ultrasound on discharge to assess this and re-initiate routine follow-up with Vascular surgery   - BLE arterial ultrasound done on 12/6/24 consistent with bilateral moderate stenosis with areas of monophasic flow   - Continue Plavix 75mg qd and Lipitor 80mg qd     Moderate Normocytic Anemia  Iron deficiency   - H/H stable   - Iron saturation 16, Transferrin 386, TIBC 416, consider initiating PO iron supplementation      Hx of COPD   - PRN Duonebs q 4hr   - SpO2 goal 88-92%, avoid over-oxygenation    CODE STATUS: Full  Access: PIV  Antibiotics: none  Diet: Cardaic, Low Na, 1.5 L fluid restriction  DVT ppx: Xarelto  GI ppx: none  Fluids: none      Disposition:  Admitted for acute on chronic CHF exacerbation currently undergoing diuresis and responding well to IV Lasix 40 BID. Last known documented dry weight: 209lbs, today's weight: 237lbs. Continue to diurese with accurate I&O. Encourage fluid restriction.     Sharad Amin MD  [facility] PGY-III

## 2024-12-19 NOTE — PT/OT/SLP PROGRESS
Physical Therapy Treatment    Patient Name:  Ran Reyes Jr.   MRN:  [id number]    Recommendations     Therapy Intensity Recommendations at Discharge: Low Intensity Therapy  Discharge Equipment Recommendations: shower chair, rollator (has rollator walker)   Barriers to discharge: fall risk and decreased endurance    Assessment     Ran Reyes Jr. is a 69 y.o. male admitted with a medical diagnosis of:  1. COPD exacerbation    2. Shortness of breath    3. Congestive heart failure, unspecified HF chronicity, unspecified heart failure type    4. Hypervolemia, unspecified hypervolemia type    5. Acute on chronic congestive heart failure, unspecified heart failure type    6. Acute exacerbation of CHF (congestive heart failure)    7. Chronic obstructive pulmonary disease, unspecified COPD type    8. History of COPD       Patient Active Problem List   Diagnosis    Primary open angle glaucoma (POAG) of right eye, moderate stage    Combined forms of age-related cataract of left eye    Arteriosclerosis of coronary artery    Chronic atrial fibrillation    Dyslipidemia    Hypertension    Tobacco use    PVD (peripheral vascular disease)    VHD (valvular heart disease)    COVID-19    HFrEF (heart failure with reduced ejection fraction)    Nonrheumatic mitral valve regurgitation    Postoperative eye state    Scrotal hematoma    Chronic obstructive pulmonary disease, unspecified    Lesion of external ear    Low back pain    Other thrombophilia    Secondary hyperaldosteronism    Positive colorectal cancer screening using Cologuard test    Acute heart failure    History of COPD    CHF exacerbation    Acute cystitis with hematuria      He presents with the following impairments/functional limitations:  impaired cardiopulmonary response to activity, impaired endurance, gait instability, pain.    Rehab Prognosis: Good.    Patient would benefit from continued skilled acute PT services to: address above listed  impairments/functional limitations; receive patient/caregiver education; reduce fall risk; and maximize independency/safety with functional mobility.    -continued: up-to-chair, ambulation, with progression of gait distance/frequency/duration and speed, as tolerated/appropriate, with assistance and supervision    Recent Surgery: * No surgery found *      Plan     During this hospitalization, patient to be seen 5 x/week to address the identified impairments/functional limitations via gait training, therapeutic exercises and progress toward the established goals.    Plan of Care Expires:  01/14/25    Subjective     Communicated with patient's nurse Magaly prior to session.    Patient agreeable to participate in treatment session.    Chief Complaint: pain complaints  Patient/Family Comments/goals: none verbalized  Pain/Comfort:  Pain Rating 1: 5/10  Location 1:  (posterior head and low back)  Pain Addressed 1: Nurse notified, Cessation of Activity  Pain Rating Post-Intervention 1: 7/10    Objective     Patient found supine in bed, with HOB elevated, and with bed rails up bilateral HOB with peripheral IV, oxygen and CNA in room checking patients vital signs upon PT entry to room.    General Precautions: Standard, fall   Orthopedic Precautions:N/A   Braces: N/A  Respiratory Status: 2 liters/min O2 via nasal cannula  SAT O2 Check: n/a    Functional Mobility:    Bed Mobility:  Rolling Left: modified independence  Rolling Right: modified independence  Scooting: modified independence  Supine to Sit: modified independence  Sit to Supine: modified independence  with no cues required    Transfers:  Sit to Stand: modified independence with no assistive device  Stand to Sit: modified independence with no assistive device  with no cues required    Gait:  Patient ambulated 130ft  Sitting rest x3 minutes  Patient ambulated 260ft  with rolling walker and supervision.  Patient demonstrates :       steady gait       no loss of  balance       no mis-steps       decreased susie       decreased bilateral step length       wide base of support.    Other Mobility:  N/A    Balance:  Sit  Patient demonstrated static balance on level surface with independence with no verbal cues.  Patient demonstrated dynamic balance on level surface with modified independence with no verbal cues during moderate excursions.  Stand  Patient demonstrated static balance on level surface  using rolling walker with modified independence with no verbal cues.    Patient left supine in bed, with HOB elevated, and with bed rails up bilateral HOB with peripheral IV, oxygen with all lines intact, call button in reach, tray table at bedside, and patient's nurse notified.    Education     Patient educated on and assisted with functional mobility as noted above.  Patient educated on PT Plan of Care  Patient was instructed to utilize staff assistance for mobility/transfers.  White board updated regarding patient's safest level of mobility with staff assistance.    Goals     Multidisciplinary Problems       Physical Therapy Goals       Problem: Physical Therapy    Goal Priority Disciplines Outcome Interventions   Physical Therapy Goal     PT, PT/OT Progressing    Description: REVIEWED 12/19/2024  Goals to be met by: DISCHARGE  Patient will increase functional independence with mobility by performing:  -. Supine to sit with [place] - ONGOING  -. Sit to supine with [place] - ONGOING  -. Rolling to Left and Right with [place] - ONGOING  -. Sit to stand transfer with [place] - ONGOING  -. Gait  x 130ft x3 w/ sitting rest x4 minutes b/w sessions with Supervision using Rolling Walker - MET 12/19/2024           Time Tracking     PT Received On: 12/19/24  PT Start Time: 1125     PT Stop Time: 1146  PT Total Time (min): 21 min     Billable Minutes: Gait Training 21  Non-Billable Minutes: N/A  12/19/2024

## 2024-12-20 VITALS
DIASTOLIC BLOOD PRESSURE: 65 MMHG | SYSTOLIC BLOOD PRESSURE: 98 MMHG | WEIGHT: 237.69 LBS | RESPIRATION RATE: 18 BRPM | OXYGEN SATURATION: 97 % | HEIGHT: 72 IN | TEMPERATURE: 98 F | BODY MASS INDEX: 32.19 KG/M2 | HEART RATE: 58 BPM

## 2024-12-20 LAB
ALBUMIN SERPL-MCNC: 3.6 G/DL (ref 3.4–4.8)
ALBUMIN/GLOB SERPL: 1.2 RATIO (ref 1.1–2)
ALP SERPL-CCNC: 105 UNIT/L (ref 40–150)
ALT SERPL-CCNC: 13 UNIT/L (ref 0–55)
ANION GAP SERPL CALC-SCNC: 7 MEQ/L
AST SERPL-CCNC: 16 UNIT/L (ref 5–34)
BASOPHILS # BLD AUTO: 0.02 X10(3)/MCL
BASOPHILS NFR BLD AUTO: 0.3 %
BILIRUB SERPL-MCNC: 1.3 MG/DL
BUN SERPL-MCNC: 23.4 MG/DL (ref 8.4–25.7)
CALCIUM SERPL-MCNC: 9.2 MG/DL (ref 8.8–10)
CHLORIDE SERPL-SCNC: 105 MMOL/L (ref 98–107)
CO2 SERPL-SCNC: 29 MMOL/L (ref 23–31)
CREAT SERPL-MCNC: 1.13 MG/DL (ref 0.72–1.25)
CREAT/UREA NIT SERPL: 21
EOSINOPHIL # BLD AUTO: 0.12 X10(3)/MCL (ref 0–0.9)
EOSINOPHIL NFR BLD AUTO: 2 %
ERYTHROCYTE [DISTWIDTH] IN BLOOD BY AUTOMATED COUNT: 20.8 % (ref 11.5–17)
GFR SERPLBLD CREATININE-BSD FMLA CKD-EPI: >60 ML/MIN/1.73/M2
GLOBULIN SER-MCNC: 3.1 GM/DL (ref 2.4–3.5)
GLUCOSE SERPL-MCNC: 96 MG/DL (ref 82–115)
HCT VFR BLD AUTO: 30.2 % (ref 42–52)
HGB BLD-MCNC: 9.2 G/DL (ref 14–18)
IMM GRANULOCYTES # BLD AUTO: 0.01 X10(3)/MCL (ref 0–0.04)
IMM GRANULOCYTES NFR BLD AUTO: 0.2 %
LYMPHOCYTES # BLD AUTO: 1.24 X10(3)/MCL (ref 0.6–4.6)
LYMPHOCYTES NFR BLD AUTO: 20.2 %
MAGNESIUM SERPL-MCNC: 1.8 MG/DL (ref 1.6–2.6)
MCH RBC QN AUTO: 27.4 PG (ref 27–31)
MCHC RBC AUTO-ENTMCNC: 30.5 G/DL (ref 33–36)
MCV RBC AUTO: 89.9 FL (ref 80–94)
MONOCYTES # BLD AUTO: 0.81 X10(3)/MCL (ref 0.1–1.3)
MONOCYTES NFR BLD AUTO: 13.2 %
NEUTROPHILS # BLD AUTO: 3.93 X10(3)/MCL (ref 2.1–9.2)
NEUTROPHILS NFR BLD AUTO: 64.1 %
NRBC BLD AUTO-RTO: 0 %
PHOSPHATE SERPL-MCNC: 3.9 MG/DL (ref 2.3–4.7)
PLATELET # BLD AUTO: 169 X10(3)/MCL (ref 130–400)
PMV BLD AUTO: 10.7 FL (ref 7.4–10.4)
POTASSIUM SERPL-SCNC: 4.1 MMOL/L (ref 3.5–5.1)
PROT SERPL-MCNC: 6.7 GM/DL (ref 5.8–7.6)
RBC # BLD AUTO: 3.36 X10(6)/MCL (ref 4.7–6.1)
SODIUM SERPL-SCNC: 141 MMOL/L (ref 136–145)
WBC # BLD AUTO: 6.13 X10(3)/MCL (ref 4.5–11.5)

## 2024-12-20 PROCEDURE — 25000003 PHARM REV CODE 250

## 2024-12-20 PROCEDURE — 36415 COLL VENOUS BLD VENIPUNCTURE: CPT

## 2024-12-20 PROCEDURE — 97116 GAIT TRAINING THERAPY: CPT

## 2024-12-20 PROCEDURE — 25000242 PHARM REV CODE 250 ALT 637 W/ HCPCS

## 2024-12-20 PROCEDURE — 80053 COMPREHEN METABOLIC PANEL: CPT

## 2024-12-20 PROCEDURE — 94640 AIRWAY INHALATION TREATMENT: CPT

## 2024-12-20 PROCEDURE — 83735 ASSAY OF MAGNESIUM: CPT

## 2024-12-20 PROCEDURE — 94761 N-INVAS EAR/PLS OXIMETRY MLT: CPT

## 2024-12-20 PROCEDURE — 84100 ASSAY OF PHOSPHORUS: CPT

## 2024-12-20 PROCEDURE — 85025 COMPLETE CBC W/AUTO DIFF WBC: CPT

## 2024-12-20 PROCEDURE — 27000221 HC OXYGEN, UP TO 24 HOURS

## 2024-12-20 PROCEDURE — S4991 NICOTINE PATCH NONLEGEND: HCPCS

## 2024-12-20 PROCEDURE — 63600175 PHARM REV CODE 636 W HCPCS

## 2024-12-20 RX ORDER — MAGNESIUM SULFATE 1 G/100ML
1 INJECTION INTRAVENOUS ONCE
Status: COMPLETED | OUTPATIENT
Start: 2024-12-20 | End: 2024-12-20

## 2024-12-20 RX ORDER — DICLOFENAC SODIUM 10 MG/G
2 GEL TOPICAL NIGHTLY PRN
Status: DISCONTINUED | OUTPATIENT
Start: 2024-12-20 | End: 2024-12-20 | Stop reason: HOSPADM

## 2024-12-20 RX ORDER — LIDOCAINE 50 MG/G
2 PATCH TOPICAL NIGHTLY
Status: DISCONTINUED | OUTPATIENT
Start: 2024-12-20 | End: 2024-12-20 | Stop reason: HOSPADM

## 2024-12-20 RX ADMIN — IPRATROPIUM BROMIDE AND ALBUTEROL SULFATE 3 ML: .5; 3 SOLUTION RESPIRATORY (INHALATION) at 06:12

## 2024-12-20 RX ADMIN — POTASSIUM CHLORIDE 20 MEQ: 1500 TABLET, EXTENDED RELEASE ORAL at 09:12

## 2024-12-20 RX ADMIN — NICOTINE 1 PATCH: 14 PATCH, EXTENDED RELEASE TRANSDERMAL at 09:12

## 2024-12-20 RX ADMIN — MAGNESIUM SULFATE IN DEXTROSE 1 G: 10 INJECTION, SOLUTION INTRAVENOUS at 09:12

## 2024-12-20 RX ADMIN — FOLIC ACID 1 MG: 1 TABLET ORAL at 09:12

## 2024-12-20 RX ADMIN — IPRATROPIUM BROMIDE AND ALBUTEROL SULFATE 3 ML: .5; 3 SOLUTION RESPIRATORY (INHALATION) at 02:12

## 2024-12-20 RX ADMIN — IPRATROPIUM BROMIDE AND ALBUTEROL SULFATE 3 ML: .5; 3 SOLUTION RESPIRATORY (INHALATION) at 10:12

## 2024-12-20 RX ADMIN — LIDOCAINE 2 PATCH: 50 PATCH CUTANEOUS at 01:12

## 2024-12-20 RX ADMIN — FUROSEMIDE 40 MG: 10 INJECTION, SOLUTION INTRAMUSCULAR; INTRAVENOUS at 09:12

## 2024-12-20 RX ADMIN — ATORVASTATIN CALCIUM 80 MG: 40 TABLET, FILM COATED ORAL at 09:12

## 2024-12-20 RX ADMIN — SACUBITRIL AND VALSARTAN 1 TABLET: 24; 26 TABLET, FILM COATED ORAL at 09:12

## 2024-12-20 RX ADMIN — SPIRONOLACTONE 50 MG: 25 TABLET ORAL at 09:12

## 2024-12-20 RX ADMIN — CETIRIZINE HYDROCHLORIDE 10 MG: 10 TABLET, FILM COATED ORAL at 09:12

## 2024-12-20 RX ADMIN — PANTOPRAZOLE SODIUM 40 MG: 40 TABLET, DELAYED RELEASE ORAL at 09:12

## 2024-12-20 RX ADMIN — CLOPIDOGREL BISULFATE 75 MG: 75 TABLET ORAL at 09:12

## 2024-12-20 RX ADMIN — METOPROLOL SUCCINATE 12.5 MG: 25 TABLET, EXTENDED RELEASE ORAL at 09:12

## 2024-12-20 RX ADMIN — DICLOFENAC SODIUM TOPICAL GEL, 1% 2 G: 10 GEL TOPICAL at 02:12

## 2024-12-20 NOTE — PROGRESS NOTES
[facility] Medicine Wards Progress Note     Resident Team: [facility] Medicine List 2  Attending Physician: Terry Boyd MD    Subjective:      Brief HPI:  Ran Reyes is a 68 yo AA M w a PMHx of HFrEF EF 30% with valvular insufficiency, HTN, PVD, HLD, CAD, and COPD who presents to [facility] ED on 24 with complaints of 1 day history of shortness of breath, dry cough, PND, and B/L leg swelling. Patient states his dry weight is 220lbs but was discharged from previous hospitalization above this weight and only lost 3lbs during this admission dated 24. Patient reports today's home weight around 240lbs. He additionally complains of B/L leg swelling as well as acute LLE pain. Denies hx of PE and DVTs. Denies increased wheezing, sputum production, and sputum color change. Reports medication compliance but cannot state which medications he takes at this time.     Upon ED arrival, VSS and afebrile. Lab work significant for moderate normocytic anemia with increased RDW. Add on Iron panel pending. BNP elevated 1343 with negative troponin. COVID/Flu negative. EKG consistent with rate controlled A fib and borderline Qtc prolongation, 493 msec. CXR consistent with vascular congestion and enlarged cardiac silhouette. Internal medicine consulted for acute exacerbation of HFrEF.     Interval History:   Patient weight 238 lb from 236 lb yesterday despite output of 3.6 L over the past 24 hours. Patient has lost a total of 7 lbs since admission however has output >13 L. Unsure why weight lost does not correspond to fluid output. Per patient he has been drinking approx 1-1.5 L per day. Remains with LE edema which he has at baseline. Breathing much more comfortably.    Objective:     Last 24 Hour Vital Signs:  BP  Min: 94/61  Max: 113/78  Temp  Av.7 °F (36.5 °C)  Min: 97.4 °F (36.3 °C)  Max: 98 °F (36.7 °C)  Pulse  Av.1  Min: 58  Max: 109  Resp  Av.1  Min: 17  Max: 20  SpO2  Av.7 %  Min: 94 %  Max: 100  %  Weight  Av.1 kg (238 lb 6.4 oz)  Min: 108.1 kg (238 lb 6.4 oz)  Max: 108.1 kg (238 lb 6.4 oz)  I/O last 3 completed shifts:  In: 582 [P.O.:582]  Out: 6300 [Urine:6300]    Physical Examination:  Physical Exam  Vitals reviewed.   Constitutional:       Appearance: Normal appearance.   HENT:      Head: Normocephalic and atraumatic.      Mouth/Throat:      Mouth: Mucous membranes are moist.      Pharynx: Oropharynx is clear.   Eyes:      Conjunctiva/sclera: Conjunctivae normal.      Pupils: Pupils are equal, round, and reactive to light.   Cardiovascular:      Rate and Rhythm: Normal rate. Rhythm irregular.      Heart sounds: No murmur heard.  Pulmonary:      Breath sounds: Wheezing (mild end-expiratory, intermittent) present. No rales (Bilateral lower lobes).      Comments: Good air movement throughout with mild wheezing. Conversational SOB significantly improved.   Abdominal:      General: Bowel sounds are normal. There is distension.      Palpations: Abdomen is soft.      Tenderness: There is no abdominal tenderness.   Musculoskeletal:      Right lower leg: Edema (1+ pitting to mid shins) present.      Left lower leg: Edema (1+ pitting to mid shins) present.   Skin:     General: Skin is warm and dry.      Capillary Refill: Capillary refill takes less than 2 seconds.   Neurological:      Mental Status: He is alert.            Laboratory:  Most Recent Data:  CBC:   Lab Results   Component Value Date    WBC 6.13 2024    HGB 9.2 (L) 2024    HCT 30.2 (L) 2024     2024    MCV 89.9 2024    RDW 20.8 (H) 2024     WBC Differential:   Recent Labs   Lab 24  1348 24  0506 24  0402 24  0903 24  0558   WBC 6.33 6.15 6.40 6.38 6.13   HGB 9.9* 9.3* 9.2* 9.4* 9.2*   HCT 31.4* 29.0* 29.4* 29.4* 30.2*    188 175 155 169   MCV 86.3 86.1 87.5 85.7 89.9     BMP:   Lab Results   Component Value Date     2024    K 4.1 2024      12/20/2024    CO2 29 12/20/2024    BUN 23.4 12/20/2024    CREATININE 1.13 12/20/2024    CALCIUM 9.2 12/20/2024    MG 1.80 12/20/2024    PHOS 3.9 12/20/2024     LFTs:   Lab Results   Component Value Date    ALBUMIN 3.6 12/20/2024    BILITOT 1.3 12/20/2024    AST 16 12/20/2024    ALKPHOS 105 12/20/2024    ALT 13 12/20/2024     Coags:   Lab Results   Component Value Date    INR 2.7 (H) 06/11/2024     FLP:   Lab Results   Component Value Date    CHOL 96 08/14/2024    HDL 32 (L) 08/14/2024    TRIG 73 08/14/2024     DM:   Lab Results   Component Value Date    HGBA1C 5.0 03/18/2024    HGBA1C 4.7 11/15/2021    HGBA1C 5.7 10/07/2020    CREATININE 1.13 12/20/2024     Thyroid:   Lab Results   Component Value Date    TSH 1.097 03/18/2024      Anemia:   Lab Results   Component Value Date    IRON 67 12/16/2024    TIBC 416 12/16/2024    FERRITIN 37.54 12/16/2024       Lab Results   Component Value Date    LESFGCKX47 1,078 (H) 03/18/2024       Lab Results   Component Value Date    FOLATE 10.0 03/18/2024        Cardiac:   Lab Results   Component Value Date    TROPONINI 0.021 12/16/2024    BNP 1,343.9 (H) 12/16/2024         Microbiology Data:  Microbiology Results (last 7 days)       ** No results found for the last 168 hours. **             Other Results:    Radiology:  Imaging Results              X-Ray Chest 1 View (Final result)  Result time 12/16/24 14:11:02      Final result by Lito Hurst MD (12/16/24 14:11:02)                   Impression:      No acute chest disease is identified..    Cardiomegaly      Electronically signed by: Lito Hurst  Date:    12/16/2024  Time:    14:11               Narrative:    EXAMINATION:  XR CHEST 1 VIEW    CLINICAL HISTORY:  shortness of breath;, .    FINDINGS:  No alveolar consolidation, effusion, or pneumothorax is seen.   The thoracic aorta is normal  cardiac silhouette is enlarged, central pulmonary vessels and mediastinum are normal in size and are grossly unremarkable.    visualized osseous structures are grossly unremarkable.                                      Current Medications:     Infusions:       Scheduled:   atorvastatin  80 mg Oral Daily    cetirizine  10 mg Oral Daily    clopidogreL  75 mg Oral Daily    folic acid  1 mg Oral Daily    furosemide (LASIX) injection  40 mg Intravenous Q12H    LIDOcaine  2 patch Transdermal QHS    magnesium sulfate IVPB  1 g Intravenous Once    metoprolol succinate  12.5 mg Oral Daily    nicotine  1 patch Transdermal Daily    pantoprazole  40 mg Oral BID    potassium chloride SA  20 mEq Oral BID    rivaroxaban  20 mg Oral Daily    sacubitriL-valsartan  1 tablet Oral BID    spironolactone  50 mg Oral Daily        PRN:    Current Facility-Administered Medications:     acetaminophen, 650 mg, Oral, Q4H PRN    albuterol-ipratropium, 3 mL, Nebulization, Q4H PRN    dextrose 50%, 12.5 g, Intravenous, PRN    dextrose 50%, 25 g, Intravenous, PRN    diclofenac sodium, 2 g, Topical (Top), Nightly PRN    glucagon (human recombinant), 1 mg, Intramuscular, PRN    glucose, 16 g, Oral, PRN    glucose, 24 g, Oral, PRN    melatonin, 6 mg, Oral, Nightly PRN    naloxone, 0.02 mg, Intravenous, PRN    sodium chloride 0.9%, 10 mL, Intravenous, Q12H PRN    Assessment & Plan:     Acute Exacerbation of HFrEF (EF 30%) s/p ICD placement   Moderate MR, mild to moderate TR  Mildly elevated mean PASP, 44mmHg   HTN  A fib, no Xarelto and rate controlled   Hx of CAD s/p stenting and HLD  - Dry weight : 209 lb 3.5 oz on hospital discharged dated 8/18/24, weight on current admission: 241lbs, today's weight: 236 lbs.    - BNP 1343, troponin negative   - S/p 1 x IV Lasix 80mg in ED, continue Lasix 40 mg BID as he is diuresing well, to be reassessed daily   - Cardiac monitoring, daily weights, and strict I&Os   - Daily AM labs, keep K>4, Phos>3 and Mg>2, replete as needed   - Continue home Aldactone 50mg qd, Toprol 12.5mg qd and Xarelto 20mg qd   - SBPs above goal in last 24 hours,  will trial restarting Entresto 24-26 mg BID and monitor vitals       Acute LLE Pain - improving   Suspected PVD   Hx of PAD s/p R SFA atherectomy and stenting 2022    - STAT LLE U/S negative for DVT   - Also suspect peripheral venous insufficiency, has not followed with Vascular Surgery recently   - Likely will need outpatient BLE venous insufficiency ultrasound on discharge to assess this and re-initiate routine follow-up with Vascular surgery   - BLE arterial ultrasound done on 12/6/24 consistent with bilateral moderate stenosis with areas of monophasic flow   - Continue Plavix 75mg qd and Lipitor 80mg qd     Moderate Normocytic Anemia  Iron deficiency   - H/H stable   - Iron saturation 16, Transferrin 386, TIBC 416, consider initiating PO iron supplementation      Hx of COPD   - PRN Duonebs q 4hr   - SpO2 goal 88-92%, avoid over oxygenation    CODE STATUS: Full  Access: PIV  Antibiotics: none  Diet: Cardaic, Low Na, 1.5 L fluid restriction  DVT ppx: Xarelto  GI ppx: none  Fluids: none      Disposition:  Admitted for acute on chronic CHF exacerbation currently undergoing diuresis and responding well to IV Lasix 40 BID. Last known documented dry weight: 209lbs, today's weight: 237lbs. Continue to diurese with accurate I&O. Encourage fluid restriction.     Sharad Amin MD  [facility] PGY-III

## 2024-12-20 NOTE — PLAN OF CARE
Problem: Adult Inpatient Plan of Care  Goal: Plan of Care Review  12/19/2024 2146 by Kathy Dorman RN  Outcome: Progressing  12/19/2024 2146 by Kathy Dorman RN  Outcome: Progressing  Goal: Patient-Specific Goal (Individualized)  12/19/2024 2146 by Kathy Dorman RN  Outcome: Progressing  12/19/2024 2146 by Kathy Dorman RN  Outcome: Progressing  Goal: Absence of Hospital-Acquired Illness or Injury  12/19/2024 2146 by Kathy Dorman RN  Outcome: Progressing  12/19/2024 2146 by Kathy Dorman RN  Outcome: Progressing  Goal: Optimal Comfort and Wellbeing  12/19/2024 2146 by Kathy Dorman RN  Outcome: Progressing  12/19/2024 2146 by Kathy Dorman RN  Outcome: Progressing  Goal: Readiness for Transition of Care  12/19/2024 2146 by Kathy Dorman RN  Outcome: Progressing  12/19/2024 2146 by Kathy Droman RN  Outcome: Progressing     Problem: Fall Injury Risk  Goal: Absence of Fall and Fall-Related Injury  12/19/2024 2146 by Kathy Dorman RN  Outcome: Progressing  12/19/2024 2146 by Kathy Dorman RN  Outcome: Progressing

## 2024-12-20 NOTE — PT/OT/SLP PROGRESS
Physical Therapy Treatment    Patient Name:  Ran Reyes Jr.   MRN:  [id number]    Recommendations     Therapy Intensity Recommendations at Discharge: Low Intensity Therapy  Discharge Equipment Recommendations: shower chair, rollator (has rollator walker)   Barriers to discharge: fall risk and decreased endurance    Assessment     Ran Reyes Jr. is a 69 y.o. male admitted with a medical diagnosis of:  1. COPD exacerbation    2. Shortness of breath    3. Congestive heart failure, unspecified HF chronicity, unspecified heart failure type    4. Hypervolemia, unspecified hypervolemia type    5. Acute on chronic congestive heart failure, unspecified heart failure type    6. Acute exacerbation of CHF (congestive heart failure)    7. Chronic obstructive pulmonary disease, unspecified COPD type    8. History of COPD       Patient Active Problem List   Diagnosis    Primary open angle glaucoma (POAG) of right eye, moderate stage    Combined forms of age-related cataract of left eye    Arteriosclerosis of coronary artery    Chronic atrial fibrillation    Dyslipidemia    Hypertension    Tobacco use    PVD (peripheral vascular disease)    VHD (valvular heart disease)    COVID-19    HFrEF (heart failure with reduced ejection fraction)    Nonrheumatic mitral valve regurgitation    Postoperative eye state    Scrotal hematoma    Chronic obstructive pulmonary disease, unspecified    Lesion of external ear    Low back pain    Other thrombophilia    Secondary hyperaldosteronism    Positive colorectal cancer screening using Cologuard test    Acute heart failure    History of COPD    CHF exacerbation    Acute cystitis with hematuria      He presents with the following impairments/functional limitations:  impaired cardiopulmonary response to activity, impaired endurance, gait instability, pain.    Rehab Prognosis: Good.    Patient would benefit from continued skilled acute PT services to: address above listed  impairments/functional limitations; receive patient/caregiver education; reduce fall risk; and maximize independency/safety with functional mobility.    -continued: up-to-chair, ambulation, with progression of gait distance/frequency/duration and speed, as tolerated/appropriate, with assistance and supervision    Recent Surgery: * No surgery found *      Plan     During this hospitalization, patient to be seen 5 x/week to address the identified impairments/functional limitations via gait training, therapeutic exercises and progress toward the established goals.    Plan of Care Expires:  01/14/25    Subjective     Communicated with patient's nurse Jamin prior to session.    Patient agreeable to participate in treatment session.    Chief Complaint: none  Patient/Family Comments/goals: home  Pain/Comfort:  Pain Rating 1: 0/10  Pain Addressed 1: Nurse notified  Pain Rating Post-Intervention 1: 0/10    Objective     Patient found sitting edge of bed with peripheral IV, telemetry, oxygen  upon PT entry to room.    General Precautions: Standard, fall   Orthopedic Precautions:N/A   Braces: N/A  Respiratory Status: 1 liters/min O2 via nasal cannula  SAT O2 Check: n/a    Functional Mobility:    Bed Mobility:  Seated on edge of bed at start of session and left in bedside chair at end of session    Transfers:  Sit to Stand: modified independence with no assistive device  Stand to Sit: modified independence with no assistive device  with no cues required    Gait:  Patient ambulated 200ft  Standing pause x1 minute  Sitting rest x3 minutes  Patient ambulated 200ft  with rolling walker and modified independence-supervision.  Patient demonstrates :       steady gait       no loss of balance       no mis-steps       decreased susie       decreased bilateral step length       wide base of support.    Other Mobility:  N/A    Balance:  Sit  Patient demonstrated static balance on level surface with independence with no verbal  cues.  Patient demonstrated dynamic balance on level surface with modified independence with no verbal cues during moderate excursions.  Stand  Patient demonstrated static balance on level surface  using rolling walker with modified independence with no verbal cues.    Patient left sitting in recliner chair w/ LE's elevated on bed surface with peripheral IV, telemetry, oxygen with all lines intact, call button in reach, tray table at bedside, and patient's nurse notified and patients urinal in reach (as requested by patient)    Education     Patient educated on and assisted with functional mobility as noted above.  Patient educated on PT Plan of Care  Patient was instructed to utilize staff assistance for mobility/transfers.  White board updated regarding patient's safest level of mobility with staff assistance.    Goals     Multidisciplinary Problems       Physical Therapy Goals       Problem: Physical Therapy    Goal Priority Disciplines Outcome Interventions   Physical Therapy Goal     PT, PT/OT Progressing    Description: REVIEWED 12/20/2024  Goals to be met by: DISCHARGE  Patient will increase functional independence with mobility by performing:  -. Supine to sit with [place] - ONGOING  -. Sit to supine with [place] - ONGOING  -. Rolling to Left and Right with [place] - ONGOING  -. Sit to stand transfer with [place] - ONGOING  -. Gait  x 130ft x3 w/ sitting rest x4 minutes b/w sessions with Supervision using Rolling Walker - MET 12/19/2024           Time Tracking     PT Received On: 12/20/24  PT Start Time: 1059     PT Stop Time: 1114  PT Total Time (min): 15 min     Billable Minutes: Gait Training 15  Non-Billable Minutes: N/A  12/20/2024

## 2024-12-20 NOTE — DISCHARGE SUMMARY
[facility] Internal Medicine Discharge Summary    Admitting Physician: Terry Boyd MD  Attending Physician: Terry Boyd MD  Date of Admit: 12/16/2024  Date of Discharge: 12/20/2024    Condition: Stable  Outcome: Condition has improved and patient is now ready for discharge.  DISPOSITION: Home or Self Care    Discharge Diagnoses     Patient Active Problem List   Diagnosis    Primary open angle glaucoma (POAG) of right eye, moderate stage    Combined forms of age-related cataract of left eye    Arteriosclerosis of coronary artery    Chronic atrial fibrillation    Dyslipidemia    Hypertension    Tobacco use    PVD (peripheral vascular disease)    VHD (valvular heart disease)    COVID-19    HFrEF (heart failure with reduced ejection fraction)    Nonrheumatic mitral valve regurgitation    Postoperative eye state    Scrotal hematoma    Chronic obstructive pulmonary disease, unspecified    Lesion of external ear    Low back pain    Other thrombophilia    Secondary hyperaldosteronism    Positive colorectal cancer screening using Cologuard test    Acute heart failure    History of COPD    CHF exacerbation    Acute cystitis with hematuria       Principal Problem:  CHF exacerbation    Consultants and Procedures     Consultants:  IP CONSULT TO INTERNAL MEDICINE  IP CONSULT TO REGISTERED DIETITIAN/NUTRITIONIST  IP CONSULT TO SOCIAL WORK/CASE MANAGEMENT  IP CONSULT TO SOCIAL WORK/CASE MANAGEMENT    Procedures:   * No surgery found *     Brief Admission History      Ran Reyes is a 68 yo AA M w a PMHx of HFrEF EF 30% with valvular insufficiency, HTN, PVD, HLD, CAD, and COPD who presents to [facility] ED on 12/16/24 with complaints of 1 day history of shortness of breath, dry cough, PND, and B/L leg swelling. Patient states his dry weight is 220lbs but was discharged from previous hospitalization above this weight and only lost 3lbs during this admission dated 12/5/24. Patient reports today's home weight around 240lbs. He  additionally complains of B/L leg swelling as well as acute LLE pain. Denies hx of PE and DVTs. Denies increased wheezing, sputum production, and sputum color change. Reports medication compliance but cannot state which medications he takes at this time.     Upon ED arrival, VSS and afebrile. Lab work significant for moderate normocytic anemia with increased RDW. Add on Iron panel pending. BNP elevated 1343 with negative troponin. COVID/Flu negative. EKG consistent with rate controlled A fib and borderline Qtc prolongation, 493 msec. CXR consistent with vascular congestion and enlarged cardiac silhouette. Internal medicine consulted for acute exacerbation of HFrEF.    Hospital Course with Pertinent Findings     Patient admitted for acute on chronic CHF exacerbation in need of diuresis.  Initial BNP was 1300 and EKG showed AFib rate controlled with QT prolongation 493 milliseconds.  He diuresed well with Lasix IV 40 mg b.i.d. Total output was 15 L over his 4 day admission.  He shows significant improvement in his lower extremity edema going from 2+ admit thighs down to 1+ to mid shins on the day of discharge.  He is advised to continue Entresto at current dose of 24-26 mg due to soft blood pressures during this and previous admission.  He is also to continue PO Lasix 40 mg b.i.d. on outpatient basis.  Return to ED precautions discussed at bedside.    Discharge physical exam:  Vitals  BP: 98/65  Temp: 97.8 °F (36.6 °C)  Temp Source: Oral  Pulse: (!) 58  Resp: 18  SpO2: 97 %  Height: 6' (182.9 cm)  Weight: 107.8 kg (237 lb 11.2 oz)    Physical Exam  Vitals reviewed.   Constitutional:       General: He is not in acute distress.     Appearance: Normal appearance. He is not ill-appearing.   HENT:      Head: Normocephalic and atraumatic.      Mouth/Throat:      Mouth: Mucous membranes are moist.      Pharynx: Oropharynx is clear.   Cardiovascular:      Rate and Rhythm: Normal rate and regular rhythm.      Heart sounds: No  murmur heard.  Pulmonary:      Effort: Pulmonary effort is normal.      Breath sounds: Normal breath sounds.   Abdominal:      General: Bowel sounds are normal. There is no distension.      Palpations: Abdomen is soft.      Tenderness: There is no abdominal tenderness.   Musculoskeletal:      Right lower leg: Edema (1+ pitting to mid shin) present.      Left lower leg: Edema (1+ pitting to mid shin) present.   Skin:     General: Skin is warm and dry.      Capillary Refill: Capillary refill takes less than 2 seconds.   Neurological:      Mental Status: He is alert.           TIME SPENT ON DISCHARGE: 60 minutes    Discharge Medications        Medication List        CONTINUE taking these medications      albuterol-ipratropium 2.5 mg-0.5 mg/3 mL nebulizer solution  Commonly known as: DUO-NEB  Take 3 mLs by nebulization every 6 (six) hours as needed for Wheezing. Rescue     aspirin 81 MG EC tablet  Commonly known as: ECOTRIN  Take 1 tablet (81 mg total) by mouth once daily.     atorvastatin 80 MG tablet  Commonly known as: LIPITOR  Take 1 tablet (80 mg total) by mouth once daily.     cetirizine 10 MG tablet  Commonly known as: ZYRTEC  Take 1 tablet (10 mg total) by mouth once daily.     clopidogreL 75 mg tablet  Commonly known as: PLAVIX     folic acid 1 MG tablet  Commonly known as: FOLVITE  Take 1 tablet (1 mg total) by mouth once daily.     furosemide 40 MG tablet  Commonly known as: LASIX  Take 1 tablet (40 mg total) by mouth 2 (two) times a day.     pantoprazole 40 MG tablet  Commonly known as: PROTONIX  Take 1 tablet (40 mg total) by mouth 2 (two) times a day.     potassium chloride SA 20 MEQ tablet  Commonly known as: K-DUR,KLOR-CON  Take 1 tablet (20 mEq total) by mouth 2 (two) times daily.     rivaroxaban 20 mg Tab  Commonly known as: XARELTO  Take 1 tablet (20 mg total) by mouth Daily.     sacubitriL-valsartan 24-26 mg per tablet  Commonly known as: ENTRESTO  Take 1 tablet by mouth 2 (two) times daily.      spironolactone 50 MG tablet  Commonly known as: ALDACTONE  Take 1 tablet (50 mg total) by mouth once daily.     triamcinolone acetonide 0.1% 0.1 % cream  Commonly known as: KENALOG            STOP taking these medications      hydrOXYzine HCL 10 MG Tab  Commonly known as: ATARAX     midodrine 10 MG tablet  Commonly known as: PROAMATINE     nitrofurantoin (macrocrystal-monohydrate) 100 MG capsule  Commonly known as: MACROBID     predniSONE 20 MG tablet  Commonly known as: DELTASONE            ASK your doctor about these medications      metoprolol succinate 25 MG 24 hr tablet  Commonly known as: TOPROL-XL  Take 0.5 tablets (12.5 mg total) by mouth once daily.     varenicline 0.5 mg (11)- 1 mg (42) tablet  Commonly known as: CHANTIX STARTING MONTH BOX  Take one 0.5mg tab by mouth once daily X3 days,then increase to one 0.5mg tab twice daily X4 days,then increase to one 1mg tab twice daily            Discharge Information:     - Continue Lasix 40 mg PO BID     Follow-up Information       Abiodun Recinos DO. Schedule an appointment as soon as possible for a visit.    Specialty: Internal Medicine  Contact information:  [address] [facility] [id number] [phone]               [facility] - Emergency Dept Follow up.    Specialty: Emergency Medicine  Contact information:  [address] [place] [id number] [phone]                       Sharad Amin MD  [facility] PGY-III

## 2024-12-20 NOTE — CARE UPDATE
Informed by nurse that patient had 7 beat run of V Tach on tele monitor but was asleep and asymptomatic at that time.

## 2024-12-20 NOTE — PLAN OF CARE
12/20/24 1428   Final Note   Assessment Type Final Discharge Note   Anticipated Discharge Disposition Home-Health   Post-Acute Status   Post-Acute Authorization Home Health   Home Health Status Set-up Complete/Auth obtained   Coverage [facility] Managed Medicare   Discharge Delays None known at this time     Patient is being discharged home with [facility] .

## 2024-12-20 NOTE — PT/OT/SLP DISCHARGE
POST DISCHARGE DOCUMENTATION - 12/20/2024 5:35 PM    Physical Therapy Discharge Summary    Name: Ran Reyes Jr.  MRN: [id number]   Principal Problem: CHF exacerbation   1. COPD exacerbation    2. Shortness of breath    3. Congestive heart failure, unspecified HF chronicity, unspecified heart failure type    4. Hypervolemia, unspecified hypervolemia type    5. Acute on chronic congestive heart failure, unspecified heart failure type    6. Acute exacerbation of CHF (congestive heart failure)    7. Chronic obstructive pulmonary disease, unspecified COPD type    8. History of COPD       Patient Active Problem List   Diagnosis    Primary open angle glaucoma (POAG) of right eye, moderate stage    Combined forms of age-related cataract of left eye    Arteriosclerosis of coronary artery    Chronic atrial fibrillation    Dyslipidemia    Hypertension    Tobacco use    PVD (peripheral vascular disease)    VHD (valvular heart disease)    COVID-19    HFrEF (heart failure with reduced ejection fraction)    Nonrheumatic mitral valve regurgitation    Postoperative eye state    Scrotal hematoma    Chronic obstructive pulmonary disease, unspecified    Lesion of external ear    Low back pain    Other thrombophilia    Secondary hyperaldosteronism    Positive colorectal cancer screening using Cologuard test    Acute heart failure    History of COPD    CHF exacerbation    Acute cystitis with hematuria      Recommendations - per last treatment session     Therapy Intensity Recommendations at Discharge: Low Intensity Therapy  Discharge Equipment Recommendations: shower chair, rollator (has rollator walker)     Assessment:     Patient last seen by PT SERVICES on 12/20/2024    Patient was unexpectedly discharged from hospital.    Refer to therapy's initial evaluation or last treatment (progress) note for patient's last known functional status, goal achievement and therapists' recommendations.    -continued: up-to-chair, ambulation, with  progression of gait distance/frequency/duration and speed, as tolerated/appropriate, with assistance and supervision (AS ORDERED BY M.DLogan)    Objective     GOALS: 1 out of 5 STG's met by patient    Multidisciplinary Problems       Physical Therapy Goals       Problem: Physical Therapy    Goal Priority Disciplines Outcome Interventions   Physical Therapy Goal     PT, PT/OT Progressing    Description: REVIEWED 12/20/2024  Goals to be met by: DISCHARGE  Patient will increase functional independence with mobility by performing:  -. Supine to sit with Cocoa - ONGOING - NOT MET  -. Sit to supine with Cocoa - ONGOING - NOT MET  -. Rolling to Left and Right with Cocoa - ONGOING - NOT MET  -. Sit to stand transfer with Cocoa - ONGOING - NOT MET  -. Gait  x 130ft x3 w/ sitting rest x4 minutes b/w sessions with Supervision using Rolling Walker - MET 12/19/2024           Plan     Patient Discharged to: home or self care per chart.    12/20/2024

## 2024-12-23 ENCOUNTER — HOSPITAL ENCOUNTER (EMERGENCY)
Facility: HOSPITAL | Age: 69
Discharge: HOME | End: 2024-12-23
Attending: EMERGENCY MEDICINE
Payer: MEDICARE

## 2024-12-23 ENCOUNTER — PATIENT OUTREACH (OUTPATIENT)
Dept: ADMINISTRATIVE | Facility: CLINIC | Age: 69
End: 2024-12-23
Payer: MEDICARE

## 2024-12-23 VITALS
BODY MASS INDEX: 31.53 KG/M2 | SYSTOLIC BLOOD PRESSURE: 126 MMHG | HEART RATE: 70 BPM | HEIGHT: 72 IN | TEMPERATURE: 97 F | RESPIRATION RATE: 18 BRPM | OXYGEN SATURATION: 98 % | WEIGHT: 232.81 LBS | DIASTOLIC BLOOD PRESSURE: 98 MMHG

## 2024-12-23 DIAGNOSIS — R04.0 EPISTAXIS: Primary | ICD-10-CM

## 2024-12-23 PROCEDURE — 99282 EMERGENCY DEPT VISIT SF MDM: CPT

## 2024-12-23 PROCEDURE — 25000003 PHARM REV CODE 250: Performed by: EMERGENCY MEDICINE

## 2024-12-23 RX ORDER — OXYMETAZOLINE HCL 0.05 %
2 SPRAY, NON-AEROSOL (ML) NASAL 2 TIMES DAILY
Status: DISCONTINUED | OUTPATIENT
Start: 2024-12-23 | End: 2024-12-23 | Stop reason: HOSPADM

## 2024-12-23 RX ADMIN — OXYMETAZOLINE HCL 2 SPRAY: 0.05 SPRAY NASAL at 09:12

## 2024-12-23 NOTE — ED PROVIDER NOTES
ED PROVIDER NOTE  12/23/2024    CHIEF COMPLAINT:   Chief Complaint   Patient presents with    Epistaxis     Reports blew his nose around 7am this morning and nose bleed started to right nare. On xarelto. Also reports right eye blurred vision that started right after blowing his nose that persist. Dr. Lynne evaluated in triage, no code fast.        HISTORY OF PRESENT ILLNESS:   Ran Reyes Jr. is a 69 y.o. male who presents with chief complaint Nosebleed.  Onset was this morning when he woke up and states he blew his nose and it started bleeding from the right side.  He reports that he uses supplemental oxygen and when he sleeps via nasal cannula and this dries out his nasal passages and he has been having some sinus congestion.  Denies having headache.  He is on Xarelto for atrial fibrillation.    The history is provided by the patient.         REVIEW OF SYSTEMS: as noted in the HPI.  NURSING NOTES REVIEWED      PAST MEDICAL/SURGICAL HISTORY:   Past Medical History:   Diagnosis Date    A-fib     Anticoagulant long-term use     Aortic aneurysm     Cataract     CHF (congestive heart failure)     Chronic atrial fibrillation     COPD (chronic obstructive pulmonary disease)     Coronary artery disease     HLD (hyperlipidemia)     Hypertension     Mitral regurgitation     PAD (peripheral artery disease)     Primary open angle glaucoma (POAG)       Past Surgical History:   Procedure Laterality Date    ANGIOGRAM, CORONARY, WITH LEFT HEART CATHETERIZATION N/A 03/26/2024    Procedure: Angiogram, Coronary, with Left Heart Cath;  Surgeon: Adriano Montiel MD;  Location: [facility] CATH LAB;  Service: Cardiology;  Laterality: N/A;    ATHERECTOMY OF PERIPHERAL VESSEL Left 09/12/2022    LEFT SFA ATHERECTOMY, BALLOON ANGIOPLASTY    CATARACT EXTRACTION W/  INTRAOCULAR LENS IMPLANT Left 03/09/2023    Procedure: EXTRACTION, CATARACT, WITH IOL INSERTION;  Surgeon: Georgi Borja MD;  Location: [facility];  Service: Ophthalmology;   Laterality: Left;  19.5  mac    EGD, WITH CLOSED BIOPSY  03/22/2024    Procedure: EGD, WITH CLOSED BIOPSY;  Surgeon: Ronald Calderon MD;  Location[facility];  Service: Gastroenterology;;    ESOPHAGOGASTRODUODENOSCOPY N/A 03/22/2024    Procedure: EGD;  Surgeon: Ronald Calderon MD;  Location: [facility];  Service: Gastroenterology;  Laterality: N/A;    Heart Stent N/A     > 10yrs. AGO    INCISION AND DRAINAGE N/A 03/18/2024    Procedure: Incision and Drainage;  Surgeon: Fahad Rivas MD;  Location: [facility] OR;  Service: Urology;  Laterality: N/A;  I&D SCROTAL ABSCESS    INSERTION OF STENT INTO PERIPHERAL VESSEL Right 10/17/2022    RIGHT SFA ATHERECTOMY, BALLOON ANGIOPLASTY, STENT; RIGHT ANTERIOR TIBIAL ARTERY ATHERECTOMY, BALLOON ANGIOPLASTY    ORCHIECTOMY Left 03/18/2024    Procedure: ORCHIECTOMY;  Surgeon: Fahad Rivas MD;  Location: [facility];  Service: Urology;  Laterality: Left;       FAMILY HISTORY:   Family History   Problem Relation Name Age of Onset    Cancer Mother      Hypertension Mother      Heart failure Father      Stroke Father      Hypertension Father      No Known Problems Sister         SOCIAL HISTORY:   Social History     Tobacco Use    Smoking status: Every Day     Current packs/day: 0.25     Average packs/day: 0.3 packs/day for 40.0 years (10.0 ttl pk-yrs)     Types: Cigarettes     Passive exposure: Current    Smokeless tobacco: Never    Tobacco comments:     States smoke 2-3 cigarettes per day   Substance Use Topics    Alcohol use: Yes     Alcohol/week: 3.0 standard drinks of alcohol     Types: 3 Cans of beer per week     Comment: socially    Drug use: Not Currently     Frequency: 1.0 times per week     Types: Marijuana     Comment: no use in over 1 year       ALLERGIES: Review of patient's allergies indicates:  No Known Allergies    PHYSICAL EXAM:  Initial Vitals [12/23/24 0938]   BP Pulse Resp Temp SpO2   109/74 (!) 59 18 97.4 °F (36.3 °C) 98 %       MAP       --         Physical Exam    Nursing note and vitals reviewed.  Constitutional: He appears well-developed and well-nourished. No distress.   HENT:   Head: Normocephalic and atraumatic.   Nose: Epistaxis (Right nostril) is observed. Mouth/Throat: Oropharynx is clear and moist and mucous membranes are normal.   Eyes: Conjunctivae and EOM are normal. Pupils are equal, round, and reactive to light.   Neck: Neck supple. No tracheal deviation present.   Cardiovascular:  Normal rate, normal heart sounds, intact distal pulses and normal pulses.           Pulmonary/Chest: Effort normal and breath sounds normal. No respiratory distress.   Abdominal: Abdomen is soft. There is no abdominal tenderness. There is no rebound and no guarding.   Musculoskeletal:         General: Normal range of motion.      Cervical back: Neck supple.     Neurological: He is alert and oriented to person, place, and time. GCS eye subscore is 4. GCS verbal subscore is 5. GCS motor subscore is 6.   CN II-XII intact. Moves all extremities. No gross sensory or motor deficits.   Skin: Skin is warm, dry and intact.   Psychiatric: He has a normal mood and affect. His speech is normal and behavior is normal. Judgment and thought content normal. Cognition and memory are normal.         RESULTS:  Labs Reviewed - No data to display  Imaging Results    None         PROCEDURES:  Procedures    ECG:       ED COURSE AND MEDICAL DECISION MAKING:  Medications - No data to display  ED Course as of 12/24/24 1500   Mon Dec 23, 2024   1025 Epistaxis resolved after Afrin. [IB]      ED Course User Index  [IB] David Lynne, DO        Medical Decision Making  69-year-old male who presents with right-sided epistaxis.  Differential diagnosis includes digital trauma, anterior epistaxis, posterior epistaxis.  This resolved after Afrin and anterior pressure.  Given referral for ENT.  Given strict ED return precautions. I have spoken with the patient and/or caregivers.  I have explained the patient's condition, diagnoses and treatment plan based on the information available to me at this time. I have answered the patient's and/or caregiver's questions and addressed any concerns. The patient and/or caregivers have as good an understanding of the patient's diagnosis, condition and treatment plan as can be expected at this point. The vital signs have been stable. The patient's condition is stable and appropriate for discharge from the emergency department.     The patient will pursue further outpatient evaluation with the primary care physician or other designated or consulting physician as outlined in the discharge instructions. The patient and/or caregivers are agreeable to this plan of care and follow-up instructions have been explained in detail. The patient and/or caregivers have received these instructions in written format and have expressed an understanding of the discharge instructions. The patient and/or caregivers are aware that any significant change in condition or worsening of symptoms should prompt an immediate return to this or the closest emergency department or a call to 911.    Risk  OTC drugs.  Prescription drug management.        CLINICAL IMPRESSION:  1. Epistaxis        DISPOSITION:   ED Disposition Condition    Discharge Stable            ED Prescriptions    None       Follow-up Information       Follow up With Specialties Details Why Contact Info    Abiodun Recinos DO Internal Medicine Schedule an appointment as soon as possible for a visit   [address] [facility] [address]  [phone]      [facility] - Emergency Dept Emergency Medicine  If symptoms worsen [address] [facility] [id number] [phone]               David Lynne DO  12/24/24 2652

## 2024-12-23 NOTE — PROGRESS NOTES
C3 nurse attempted to contact Ran Reyes Jr.  for a TCC post hospital discharge follow up call. No answer. Left voicemail with callback information. The patient has a scheduled HOSFU appointment with [facility] IM Clinic on 12/30/2024 @ 1230 pm.

## 2024-12-24 NOTE — PROGRESS NOTES
C3 nurse attempted to contact Ran Reyes Jr.  for a TCC post hospital discharge follow up call. No answer. Left voicemail with callback information. The patient has a scheduled HOSFU appointment with [facility] IM Clinic on 12/30/2024 @ 1230 pm.

## 2024-12-24 NOTE — PROGRESS NOTES
C3 nurse attempted to contact Ran Reyes Jr.  for a TCC post hospital discharge follow up call. No answer. Left voicemail with callback information. The patient has a scheduled HOSFU appointment with [facility] IM Clinic on 12/30/2024 @ 1230 pm.

## 2024-12-29 NOTE — PROGRESS NOTES
[facility] INTERNAL MEDICINE  POST-HAMPT[place] VISIT NOTE    SUBJECTIVE:      HPI: Ran Reyes Jr. is a 69 y.o. yo male w/ PMH of HFrEF (EF 30%), HTN, PVD, HLD, CAD, and COPD (on 2 L/min), who presents today for post-hampton F/U. He was hospitalized on 12/5/2024 for COPD exacerbation and was discharged with a short course of Prednisone and again on 12/16/2024 due to increased swelling in lower extremities and shortness of breath and was treated for CHF exacerbation with IV Furosemide. At this time, the patient states that he continues to have shortness of breath but symptom had improved compared to when he was hospitalized. Current weight is 228 lbs, it was 241 lbs on 12/16/2024.      ROS:  (+) Dyspnea on exertion  (-) Chest pain, palpitations, coughs, dizziness.     OBJECTIVE:     Vital signs:   /62 (BP Location: Left arm, Patient Position: Sitting)   Pulse 70   Temp 98.7 °F (37.1 °C) (Oral)   Resp (!) 22   Ht 6' (1.829 m)   Wt 103.4 kg (228 lb)   SpO2 97%   BMI 30.92 kg/m²      Physical Examination:  General: Well nourished w/o distress  HEENT: NC/AT; PERRL; nasal and oral mucosa moist and clear  Pulm: Crackles in lower lobes bilaterally  CV: S1, S2 w/o murmurs or gallops; 1+ edema in BLE noted  GI: Soft with normal bowel sounds in all quadrants  MSK: Full ROM of all extremities  Neuro: AAOx4; motor/sensory function intact  Psych: Cooperative; appropriate mood and affect     ASSESSMENT & PLAN:     CHF exacerbation  HFrEF (EF 30%)  -Echo 12/5/2024: EF 30%, moderate MR, moderate TR  -Continue current medications; the following modifications were made: changed Lasix to 40mg daily due to low BP; D/C'd midodrine  -Continue to follow-up with [facility] cardiology clinic    Disposition: Follow-up with PCP at [facility] IM clinic on 1/21/2025    Shanel Dyer DO   [facility] Internal Medicine, PGY-III

## 2024-12-30 ENCOUNTER — OFFICE VISIT (OUTPATIENT)
Dept: INTERNAL MEDICINE | Facility: CLINIC | Age: 69
End: 2024-12-30
Payer: MEDICARE

## 2024-12-30 VITALS
SYSTOLIC BLOOD PRESSURE: 102 MMHG | TEMPERATURE: 99 F | HEIGHT: 72 IN | OXYGEN SATURATION: 97 % | HEART RATE: 70 BPM | BODY MASS INDEX: 30.88 KG/M2 | RESPIRATION RATE: 22 BRPM | WEIGHT: 228 LBS | DIASTOLIC BLOOD PRESSURE: 62 MMHG

## 2024-12-30 DIAGNOSIS — I50.9 CHRONIC CONGESTIVE HEART FAILURE, UNSPECIFIED HEART FAILURE TYPE: ICD-10-CM

## 2024-12-30 DIAGNOSIS — I10 PRIMARY HYPERTENSION: ICD-10-CM

## 2024-12-30 DIAGNOSIS — I50.20 HFREF (HEART FAILURE WITH REDUCED EJECTION FRACTION): ICD-10-CM

## 2024-12-30 DIAGNOSIS — I50.9 ACUTE ON CHRONIC CONGESTIVE HEART FAILURE, UNSPECIFIED HEART FAILURE TYPE: Primary | ICD-10-CM

## 2024-12-30 DIAGNOSIS — J44.9 CHRONIC OBSTRUCTIVE PULMONARY DISEASE, UNSPECIFIED COPD TYPE: ICD-10-CM

## 2024-12-30 PROCEDURE — 99215 OFFICE O/P EST HI 40 MIN: CPT | Mod: PBBFAC | Performed by: STUDENT IN AN ORGANIZED HEALTH CARE EDUCATION/TRAINING PROGRAM

## 2024-12-30 RX ORDER — GUAIFENESIN 1200 MG
650 TABLET, EXTENDED RELEASE 12 HR ORAL 2 TIMES DAILY PRN
COMMUNITY

## 2024-12-30 RX ORDER — METOPROLOL SUCCINATE 25 MG/1
12.5 TABLET, EXTENDED RELEASE ORAL DAILY
Qty: 45 TABLET | Refills: 3 | Status: SHIPPED | OUTPATIENT
Start: 2024-12-30 | End: 2025-12-30

## 2024-12-30 RX ORDER — CHOLECALCIFEROL (VITAMIN D3) 25 MCG
1000 TABLET ORAL DAILY
COMMUNITY

## 2024-12-30 RX ORDER — SACUBITRIL AND VALSARTAN 49; 51 MG/1; MG/1
1 TABLET, FILM COATED ORAL 2 TIMES DAILY
COMMUNITY
Start: 2024-12-20

## 2024-12-30 RX ORDER — SPIRONOLACTONE 25 MG/1
25 TABLET ORAL DAILY
COMMUNITY
Start: 2024-11-11

## 2024-12-30 NOTE — PROGRESS NOTES
I saw and evaluated the patient and was present for the key portions of the exam and separately billed procedures, if any. I have reveiwed and agree with the resident's findings, including all diagnostic interpretations and plans as written.    Mr. Reyes is at his dry weight. BP is soft. We will decrease Lasix from 40mg BID to daily. Instructed that if he gains weight after this or has edema then he can switch back to BID. Will d/c midodrine. He is not taking it. 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 He also is not taking his metoprolol; he was instructed to take it.

## 2025-01-07 DIAGNOSIS — I50.20 HFREF (HEART FAILURE WITH REDUCED EJECTION FRACTION): Primary | ICD-10-CM

## 2025-01-13 ENCOUNTER — LAB REQUISITION (OUTPATIENT)
Dept: LAB | Facility: HOSPITAL | Age: 70
End: 2025-01-13
Payer: MEDICARE

## 2025-01-13 DIAGNOSIS — I10 ESSENTIAL (PRIMARY) HYPERTENSION: ICD-10-CM

## 2025-01-13 LAB
ANION GAP SERPL CALC-SCNC: 11 MEQ/L
BNP BLD-MCNC: 1005.1 PG/ML
BUN SERPL-MCNC: 17.5 MG/DL (ref 8.4–25.7)
CALCIUM SERPL-MCNC: 9.4 MG/DL (ref 8.8–10)
CHLORIDE SERPL-SCNC: 104 MMOL/L (ref 98–107)
CO2 SERPL-SCNC: 26 MMOL/L (ref 23–31)
CREAT SERPL-MCNC: 0.98 MG/DL (ref 0.72–1.25)
CREAT/UREA NIT SERPL: 18
GFR SERPLBLD CREATININE-BSD FMLA CKD-EPI: >60 ML/MIN/1.73/M2
GLUCOSE SERPL-MCNC: 84 MG/DL (ref 82–115)
POTASSIUM SERPL-SCNC: 3.3 MMOL/L (ref 3.5–5.1)
SODIUM SERPL-SCNC: 141 MMOL/L (ref 136–145)

## 2025-01-13 PROCEDURE — 80048 BASIC METABOLIC PNL TOTAL CA: CPT | Performed by: STUDENT IN AN ORGANIZED HEALTH CARE EDUCATION/TRAINING PROGRAM

## 2025-01-13 PROCEDURE — 83880 ASSAY OF NATRIURETIC PEPTIDE: CPT | Performed by: STUDENT IN AN ORGANIZED HEALTH CARE EDUCATION/TRAINING PROGRAM

## 2025-01-14 ENCOUNTER — TELEPHONE (OUTPATIENT)
Dept: INTERNAL MEDICINE | Facility: CLINIC | Age: 70
End: 2025-01-14
Payer: MEDICARE

## 2025-01-14 NOTE — TELEPHONE ENCOUNTER
WAYNE WAGGONER WITH [facility] ASKED THAT SOMEONE GIVE HER A CALL.  SHE NEED TO REPORT TESTS RESULTS.  WHEN TIME PERMITS PLEASE GIVE HER A CALL AT [phone].  THANK YOU.

## 2025-01-15 DIAGNOSIS — I50.20 HFREF (HEART FAILURE WITH REDUCED EJECTION FRACTION): Primary | ICD-10-CM

## 2025-01-16 NOTE — TELEPHONE ENCOUNTER
I spoke to Karen over the phone. Gave her the message from provider. She verbalized understanding and will instruct to give patient lasix BID x 7 days and have labs done after the 7 days. ED follow up as instructed by provider.

## 2025-01-28 ENCOUNTER — OFFICE VISIT (OUTPATIENT)
Dept: CARDIOLOGY | Facility: CLINIC | Age: 70
End: 2025-01-28
Payer: OTHER GOVERNMENT

## 2025-01-28 VITALS
RESPIRATION RATE: 20 BRPM | DIASTOLIC BLOOD PRESSURE: 67 MMHG | BODY MASS INDEX: 30.93 KG/M2 | HEART RATE: 69 BPM | HEIGHT: 72 IN | OXYGEN SATURATION: 97 % | WEIGHT: 228.38 LBS | SYSTOLIC BLOOD PRESSURE: 104 MMHG | TEMPERATURE: 98 F

## 2025-01-28 DIAGNOSIS — I25.10 ARTERIOSCLEROSIS OF CORONARY ARTERY: ICD-10-CM

## 2025-01-28 DIAGNOSIS — I50.20 HFREF (HEART FAILURE WITH REDUCED EJECTION FRACTION): Primary | ICD-10-CM

## 2025-01-28 DIAGNOSIS — I73.9 PVD (PERIPHERAL VASCULAR DISEASE): ICD-10-CM

## 2025-01-28 DIAGNOSIS — I10 PRIMARY HYPERTENSION: ICD-10-CM

## 2025-01-28 DIAGNOSIS — I50.9 HEART FAILURE: ICD-10-CM

## 2025-01-28 DIAGNOSIS — Z72.0 TOBACCO USE: ICD-10-CM

## 2025-01-28 PROCEDURE — 99214 OFFICE O/P EST MOD 30 MIN: CPT | Mod: PBBFAC | Performed by: INTERNAL MEDICINE

## 2025-01-28 RX ORDER — POTASSIUM CHLORIDE 20 MEQ/1
20 TABLET, EXTENDED RELEASE ORAL 2 TIMES DAILY
Qty: 60 TABLET | Refills: 1 | Status: SHIPPED | OUTPATIENT
Start: 2025-01-28 | End: 2026-01-28

## 2025-01-28 RX ORDER — BUDESONIDE, GLYCOPYRROLATE, AND FORMOTEROL FUMARATE 160; 9; 4.8 UG/1; UG/1; UG/1
2 AEROSOL, METERED RESPIRATORY (INHALATION) 2 TIMES DAILY
COMMUNITY
Start: 2025-01-07

## 2025-01-28 RX ORDER — MIDODRINE HYDROCHLORIDE 5 MG/1
2 TABLET ORAL 2 TIMES DAILY
COMMUNITY
Start: 2024-12-16 | End: 2025-01-28 | Stop reason: ALTCHOICE

## 2025-01-28 NOTE — PROGRESS NOTES
01/28/2025 11:09 AM    Subjective:     CHIEF COMPLAINT:   Chief Complaint   Patient presents with    f/u denies chest pain has [place] questions on leg pain                            HPI:    Mr. Ran Reyes Jr. is a 69 y.o. male with NICM-HFrEF (LVEF 30% without ACID - though needs) with history of VT arrest, non obstructive epicardial coronary artery disease, paroxysmal atrial fibrillation, peripheral artery disease (claudicant - inflow disease on the left, pSFA disease on the right), hypertension, hypercholesterolemia, and tobacco abuse who presents for follow up.     The patient has heart failure with reduced ejection fraction.  The patient previously had VT arrest and was scheduled for ICD implantation which was canceled secondary to concomitant Claudine's gangrene around that time.  The patient reports dyspnea on exertion, 2 pillow orthopnea, and lower extremity edema.  The patient denies abdominal distention or early satiety.  The patient reports adherence with all of his medication except Jardiance as he states this was discontinued after his Claudine's gangrene episode and his frequent urinary tract infections.  The patient reports taking Lasix 40 mg once daily; however, still has significant lower extremity peripheral edema up to his tibial tuberosities bilaterally.  The patient reports NYHA class 3 symptoms.    The patient denies symptoms of angina he does have dyspnea on exertion which I suspect is secondary to his heart failure rather than an anginal equivalent.    The patient reports lower extremity pain with ambulation.  He describes symptoms of claudication with pain in his thighs and bilateral calves.  He reports this pain can sometimes be present at rest but is mostly present with exertion.  The pain is relieved with rest.  The patient reports that his pain is not improved even when his lower extremity edema is resolved.  The patient denies wounds.  The patient reports a previous history of  lower extremity stent placement.    The patient denies palpitations, presyncope, or syncope.  The patient denies bleeding complications of his oral anticoagulation.    The patient does not know what his blood pressures run at home.  He states that he is no longer taking midodrine.    He continues to smoke cigarettes daily.  Was counseled on cessation.    Past Medical History    Patient Active Problem List   Diagnosis    Primary open angle glaucoma (POAG) of right eye, moderate stage    Combined forms of age-related cataract of left eye    Arteriosclerosis of coronary artery    Chronic atrial fibrillation    Dyslipidemia    Hypertension    Tobacco use    PVD (peripheral vascular disease)    VHD (valvular heart disease)    COVID-19    HFrEF (heart failure with reduced ejection fraction)    Nonrheumatic mitral valve regurgitation    Postoperative eye state    Scrotal hematoma    Chronic obstructive pulmonary disease, unspecified    Lesion of external ear    Low back pain    Other thrombophilia    Secondary hyperaldosteronism    Positive colorectal cancer screening using Cologuard test    Acute heart failure    History of COPD    CHF exacerbation    Acute cystitis with hematuria       Surgical History    Past Surgical History:   Procedure Laterality Date    ANGIOGRAM, CORONARY, WITH LEFT HEART CATHETERIZATION N/A 03/26/2024    Procedure: Angiogram, Coronary, with Left Heart Cath;  Surgeon: Adriano Montiel MD;  Location: [facility] CATH LAB;  Service: Cardiology;  Laterality: N/A;    ATHERECTOMY OF PERIPHERAL VESSEL Left 09/12/2022    LEFT SFA ATHERECTOMY, BALLOON ANGIOPLASTY    CATARACT EXTRACTION W/  INTRAOCULAR LENS IMPLANT Left 03/09/2023    Procedure: EXTRACTION, CATARACT, WITH IOL INSERTION;  Surgeon: Georgi Borja MD;  Location: [facility];  Service: Ophthalmology;  Laterality: Left;  19.5  mac    EGD, WITH CLOSED BIOPSY  03/22/2024    Procedure: EGD, WITH CLOSED BIOPSY;  Surgeon: Ronald Calderon MD;  Location:  [facility] ENDOSCOPY;  Service: Gastroenterology;;    ESOPHAGOGASTRODUODENOSCOPY N/A 03/22/2024    Procedure: EGD;  Surgeon: Ronald Calderon MD;  Location: [facility] ENDOSCOPY;  Service: Gastroenterology;  Laterality: N/A;    Heart Stent N/A     > 10yrs. AGO    INCISION AND DRAINAGE N/A 03/18/2024    Procedure: Incision and Drainage;  Surgeon: Fahad Rivas MD;  Location: [facility];  Service: Urology;  Laterality: N/A;  I&D SCROTAL ABSCESS    INSERTION OF STENT INTO PERIPHERAL VESSEL Right 10/17/2022    RIGHT SFA ATHERECTOMY, BALLOON ANGIOPLASTY, STENT; RIGHT ANTERIOR TIBIAL ARTERY ATHERECTOMY, BALLOON ANGIOPLASTY    ORCHIECTOMY Left 03/18/2024    Procedure: ORCHIECTOMY;  Surgeon: Fahad Rivas MD;  Location: [facility];  Service: Urology;  Laterality: Left;       Social History     Socioeconomic History    Marital status: Single   Tobacco Use    Smoking status: Every Day     Current packs/day: 0.25     Average packs/day: 0.3 packs/day for 40.0 years (10.0 ttl pk-yrs)     Types: Cigarettes     Passive exposure: Current    Smokeless tobacco: Never    Tobacco comments:     States smoke 2-3 cigarettes per day   Substance and Sexual Activity    Alcohol use: Yes     Alcohol/week: 3.0 standard drinks of alcohol     Types: 3 Cans of beer per week     Comment: socially    Drug use: Not Currently     Frequency: 1.0 times per week     Types: Marijuana     Comment: no use in over 1 year    Sexual activity: Not Currently     Partners: Female     Social Drivers of Health     Financial Resource Strain: Low Risk  (12/17/2024)    Overall Financial Resource Strain (CARDIA)     Difficulty of Paying Living Expenses: Not hard at all   Food Insecurity: No Food Insecurity (12/17/2024)    Hunger Vital Sign     Worried About Running Out of Food in the Last Year: Never true     Ran Out of Food in the Last Year: Never true   Transportation Needs: No Transportation Needs (12/17/2024)    TRANSPORTATION NEEDS     Transportation  : No   Physical Activity: Inactive (12/17/2024)    Exercise Vital Sign     Days of Exercise per Week: 0 days     Minutes of Exercise per Session: 0 min   Stress: No Stress Concern Present (12/17/2024)    [employer] [place] [employer] - Occupational Stress Questionnaire     Feeling of Stress : Not at all   Housing Stability: Low Risk  (12/17/2024)    Housing Stability Vital Sign     Unable to Pay for Housing in the Last Year: No     Homeless in the Last Year: No       Family History   Problem Relation Name Age of Onset    Cancer Mother      Hypertension Mother      Heart failure Father      Stroke Father      Hypertension Father      No Known Problems Sister       Review of patient's allergies indicates:  No Known Allergies    Current Medications    Current Outpatient Medications   Medication Instructions    acetaminophen 650 mg, 2 times daily PRN    ALBUTEROL INHL 2 puffs, Every 6 hours PRN    albuterol-ipratropium (DUO-NEB) 2.5 mg-0.5 mg/3 mL nebulizer solution 3 mLs, Nebulization, Every 6 hours PRN, Rescue    aspirin (ECOTRIN) 81 mg, Oral, Daily    atorvastatin (LIPITOR) 80 mg, Oral, Daily    BREZTRI AEROSPHERE 160-9-4.8 mcg/actuation HFAA 2 puffs, 2 times daily    cetirizine (ZYRTEC) 10 mg, Oral, Daily    clopidogreL (PLAVIX) 75 mg, Daily    empagliflozin (JARDIANCE) 10 mg tablet 1 tablet, Daily    ENTRESTO 49-51 mg per tablet 1 tablet, 2 times daily    folic acid (FOLVITE) 1 mg, Oral, Daily    furosemide (LASIX) 40 mg, Oral, 2 times daily    metoprolol succinate (TOPROL-XL) 12.5 mg, Oral, Daily    midodrine (PROAMATINE) 5 MG Tab 2 tablets, 2 times daily    pantoprazole (PROTONIX) 40 mg, Oral, 2 times daily    rivaroxaban (XARELTO) 20 mg, Oral, Daily    spironolactone (ALDACTONE) 25 mg, Daily    vitamin D (VITAMIN D3) 1,000 Units, Daily         ROS:   The patient denies headaches, changes in vision, nausea, emesis, fevers, chills, chest pain, palpitations, dyspnea, abdominal pain, or changes in urinary  or bowel habits.        Objective:     BP Readings from Last 3 Encounters:   01/28/25 104/67   12/30/24 102/62   12/23/24 (!) 126/98        Pulse Readings from Last 3 Encounters:   01/28/25 69   12/30/24 70   12/23/24 70        Temp Readings from Last 3 Encounters:   01/28/25 97.5 °F (36.4 °C) (Oral)   12/30/24 98.7 °F (37.1 °C) (Oral)   12/23/24 97.4 °F (36.3 °C) (Oral)       Wt Readings from Last 3 Encounters:   01/28/25 103.6 kg (228 lb 6.4 oz)   12/30/24 103.4 kg (228 lb)   12/23/24 105.6 kg (232 lb 12.9 oz)         PE:  Blood pressure 104/67, pulse 69, temperature 97.5 °F (36.4 °C), temperature source Oral, resp. rate 20, height 6' (1.829 m), weight 103.6 kg (228 lb 6.4 oz), SpO2 97%.   Physical Exam  Vitals reviewed.   Constitutional:       General: He is not in acute distress.     Appearance: Normal appearance. He is obese. He is not ill-appearing.   HENT:      Head: Normocephalic and atraumatic.      Right Ear: External ear normal.      Left Ear: External ear normal.      Nose: Nose normal.      Mouth/Throat:      Mouth: Mucous membranes are moist.   Eyes:      Conjunctiva/sclera: Conjunctivae normal.   Cardiovascular:      Rate and Rhythm: Normal rate and regular rhythm.      Pulses: Normal pulses.      Heart sounds: Murmur heard.   Pulmonary:      Effort: Pulmonary effort is normal. No respiratory distress.      Breath sounds: No stridor. Wheezing present. No rhonchi or rales.      Comments: Expiratory wheeze is present.   Chest:      Chest wall: No tenderness.   Abdominal:      General: Bowel sounds are normal. There is no distension.      Palpations: Abdomen is soft.   Musculoskeletal:      Cervical back: Neck supple.      Right lower leg: Edema present.      Left lower leg: Edema present.      Comments: Bilateral +1 pitting edema to tibial tuberosity bilaterally.    Skin:     General: Skin is warm and dry.      Comments: No lower extremity wounds.    Neurological:      Mental Status: He is alert and  oriented to person, place, and time.   Psychiatric:         Mood and Affect: Mood normal.         Behavior: Behavior normal.                                                                                                                                                                                                                                                                                                                                                                                                                                                                                CARDIAC TESTING:  Echocardiogram  Results for orders placed during the hospital encounter of 12/05/24    Echo    Interpretation Summary    Left Ventricle: The left ventricle is normal in size. Normal wall thickness. There is reduced systolic function. Quantitated ejection fraction is 30%.    Right Ventricle: Mild right ventricular enlargement. Systolic function is mildly reduced.    Left Atrium: Left atrium is moderately dilated.    Right Atrium: Right atrium is moderately dilated.    Aortic Valve: The aortic valve is a trileaflet valve. There is no stenosis. Aortic valve peak velocity is 1.8 m/s. Mean gradient is 6.3 mmHg.    Mitral Valve: The mitral valve is structurally normal. The mean pressure gradient across the mitral valve is 4 mmHg at a heart rate of  bpm. There is moderate regurgitation.    Tricuspid Valve: There is mild to moderate regurgitation.    Pulmonary Artery: The estimated pulmonary artery systolic pressure is 44 mmHg.    IVC/SVC: Intermediate venous pressure at 8 mmHg.      Stress Test  No results found for this or any previous visit.     Coronary Angiogram  Results for orders placed during the hospital encounter of 03/17/24    Cardiac catheterization    Conclusion  The procedure log was documented by No documenter listed and verified by Adriano Montiel MD.    Procedure:  Selective coronary angiography  Moderate  "(Conscious) Sedation      Indication: VT arrest, known cardiomyopathy, NSTEMI  Consent: The patient was brought to the cardiac catheterization lab. Was instructed and explained about the risk, benefit and alternatives of the procedure included but not limited to sudden cardiac death, myocardial infarction, bleeding, vascular injury, renal failure, stroke, contrast allergy, risk of conscious sedation and need for emergent bypass surgery.  the patient was agreeable to proceed.  Signed the consent form.  Access:  The patient was prepped using the usual sterile fashion.  Right radial artery  was accessed with micropuncture technique, ultrasound guidance.  An image of the ultrasound was put in the paper chart for purpose of documentation.  Sheath size:6F    Kirill test:  Verified with pulse oximetry deemed to be adequate for radial artery procedure.    Diagnostic catheters : TIG, JL 3.5    Coronary findings:    Dominance: right  Left main:  10-20% calcified distal left main disease  Left anterior descending artery:  50% calcified proximal stenosis  Circumflex artery:  Luminal irregularities  Right coronary artery:  Luminal irregularities    Access Closure:  At the end of the procedure the sheath was removed. A TR band applied to the right radial artery . Excellent hemostasis achieved.     Holter Monitor  No cardiac monitor results found for the past 12 months    Last BMP BMP  Lab Results   Component Value Date     01/13/2025    K 3.3 (L) 01/13/2025     01/13/2025    CO2 26 01/13/2025    BUN 17.5 01/13/2025    CREATININE 0.98 01/13/2025    CALCIUM 9.4 01/13/2025    EGFRNORACEVR >60 01/13/2025      Creatinine    Lab Results   Component Value Date    CREATININE 0.98 01/13/2025    CREATININE 1.13 12/20/2024    CREATININE 1.02 12/19/2024     Magnesium No components found for: "MAG"  Last CBC     Lab Results   Component Value Date    WBC 6.13 12/20/2024    HGB 9.2 (L) 12/20/2024    HCT 30.2 (L) 12/20/2024    MCV " 89.9 12/20/2024     12/20/2024           BNP    Lab Results   Component Value Date    BNP 1,005.1 (H) 01/13/2025    BNP 1,343.9 (H) 12/16/2024    BNP 1,908.3 (H) 12/05/2024     Last lipids    Lab Results   Component Value Date    CHOL 96 08/14/2024    CHOL 46 03/18/2024    CHOL 104 11/16/2021    HDL 32 (L) 08/14/2024    HDL <5 (L) 03/18/2024    HDL 41 11/16/2021    LDL 49.00 (L) 08/14/2024    LDL 49.00 (L) 11/16/2021    LDL 37 10/07/2020    TRIG 73 08/14/2024    TRIG 46 04/12/2024    TRIG 58 04/11/2024    TOTALCHOLEST 3 08/14/2024    TOTALCHOLEST  03/18/2024      Comment:      N/A      TOTALCHOLEST 3 11/16/2021      LFT     Lab Results   Component Value Date    ALT 13 12/20/2024    ALT 14 12/19/2024    ALT 15 12/18/2024    AST 16 12/20/2024    AST 18 12/19/2024    AST 18 12/18/2024     Troponin    Lab Results   Component Value Date    TROPONINI 0.021 12/16/2024    TROPONINI 0.011 12/05/2024    TROPONINI 0.013 08/26/2024       Assessment:     69 year old male with NICM-HFrEF (LVEF 30% without ACID - though needs) with history of VT arrest, non obstructive epicardial coronary artery disease, paroxysmal atrial fibrillation, peripheral artery disease (claudicant - inflow disease on the left, pSFA disease on the right), hypertension, hypercholesterolemia, and tobacco abuse who presents for follow up.     Plan:     NYHA Class III Stage C Heart Failure with Reduced Ejection Fraction with History of VT Arrest  -Continue Entresto 49-51 mg BID.   -Continue Toprol XL 25 mg daily. Up titrate as BP and HR allows.   -Continue Aldactone 25 mg daily.   -Increase to Lasix 40 mg BID. Patient is volume overloaded on examination. Start supplemental K 20 mEq BID. RFP in 1 week. Normal renal function.   -Not on SGLT2i due to history of UTI and Claudine's gangrene.  -Needs ICD placement. The patient has a persistently reduced LVEF < 35% despite OMT for 90 days along with VT arrest. ICD is warranted. Patient amenable. Previously  procedure canceled due to the patient's Claudine's gangrene.    Non Obstructive Epicardial Coronary Artery Disease  -Asymptomatic. Cardiovascular risk factor reduction.   -SAPT + OAC for PAF. High intensity statin, LDL is at goal.   -If he becomes symptomatic optimize anti-anginal therapy. If he remains symptomatic would perform iFR of proximal LAD lesion (50%).     Paroxysmal Atrial Fibrillation  -Reports dyspnea but without bleeding complications. Suspect dyspnea is related to HFrEF rather than symptomatic AF.   -Continue rate control with Toprol XL 25 mg daily.   -Continue Xarelto 20 mg each evening with meals.     Peripheral Artery Disease   -Arterial US suggestive of inflow disease on the left and pSFA disease on the right. Patient is a claudicant. No wounds.   -Referral to vascular surgery to consider peripheral angiography +/- intervention.   -Not a candidate for Cilostazol due to HFrEF.   -Smoking cessation is warranted.   -Continue Plavix 75 mg daily.   -Continue Lipitor 80 mg daily. LDL is at goal.   -Referral to Dr. Gilliland or his group for further care. Cardiology does not perform peripheral angiograms/interventions at [facility].    Hypertension  -Goal directed medical therapy for HFrEF.     Hypercholesterolemia  -Continue Lipitor 80 mg daily. LDL is at goal.     Vernon Cifuentes MD  Cardiology Fellow    Future Appointments   Date Time Provider Department Center   5/15/2025 11:30 AM Jo-Ann Parks DO [facility]

## 2025-01-30 ENCOUNTER — LAB REQUISITION (OUTPATIENT)
Dept: LAB | Facility: HOSPITAL | Age: 70
End: 2025-01-30
Payer: MEDICARE

## 2025-01-30 ENCOUNTER — TELEPHONE (OUTPATIENT)
Dept: INTERNAL MEDICINE | Facility: CLINIC | Age: 70
End: 2025-01-30
Payer: MEDICARE

## 2025-01-30 DIAGNOSIS — I10 ESSENTIAL (PRIMARY) HYPERTENSION: ICD-10-CM

## 2025-01-30 LAB
ANION GAP SERPL CALC-SCNC: 10 MEQ/L
BNP BLD-MCNC: 1798 PG/ML
BUN SERPL-MCNC: 13.9 MG/DL (ref 8.4–25.7)
CALCIUM SERPL-MCNC: 9.1 MG/DL (ref 8.8–10)
CHLORIDE SERPL-SCNC: 109 MMOL/L (ref 98–107)
CO2 SERPL-SCNC: 27 MMOL/L (ref 23–31)
CREAT SERPL-MCNC: 1.15 MG/DL (ref 0.72–1.25)
CREAT/UREA NIT SERPL: 12
GFR SERPLBLD CREATININE-BSD FMLA CKD-EPI: >60 ML/MIN/1.73/M2
GLUCOSE SERPL-MCNC: 86 MG/DL (ref 82–115)
POTASSIUM SERPL-SCNC: 3.6 MMOL/L (ref 3.5–5.1)
SODIUM SERPL-SCNC: 146 MMOL/L (ref 136–145)

## 2025-01-30 PROCEDURE — 83880 ASSAY OF NATRIURETIC PEPTIDE: CPT | Performed by: STUDENT IN AN ORGANIZED HEALTH CARE EDUCATION/TRAINING PROGRAM

## 2025-01-30 PROCEDURE — 80048 BASIC METABOLIC PNL TOTAL CA: CPT | Performed by: STUDENT IN AN ORGANIZED HEALTH CARE EDUCATION/TRAINING PROGRAM

## 2025-01-30 NOTE — TELEPHONE ENCOUNTER
NOT SURE WHO TO SEND MESSAGE TO:      WAYNE WITH [facility] CALLED TO REPORT AN ELEVATED BNP ON MR. FLORES.  SHE IS REQUESTING A CALL BACK.

## 2025-02-03 NOTE — TELEPHONE ENCOUNTER
[employer] Results Release    [employer] Status: Pending  Results Release       Contains abnormal data BNP  Order: [id number]  Status: Final result       Visible to patient: No (inaccessible in [place])       Next appt: 02/11/2025 at 09:00 AM in Cardiology ([facility], [facility] CARDIOLOGY)       Dx: Essential (primary) hypertension    0 Result Notes             Component Ref Range & Units 4 d ago 3 wk ago 1 mo ago 2 mo ago 5 mo ago 6 mo ago 7 mo ago   Natriuretic Peptide <=100.0 pg/mL 1,798.0 High  1,005.1 High  1,343.9 High  1,908.3 High  2,531.8 High  1,219.7 High  918.0 High    Resulting Agency  [facility]             Specimen Collected: 01/30/25 10:00 CST Last Resulted: 01/30/25 12:19 CST        Lab Flowsheet        Order Details        View Encounter        Lab and Collection Details        Routing        Result History     View All Conversations on this Encounter     Result Care Coordination      Patient Communication     Add Comments   Not seen Back to Top

## 2025-02-03 NOTE — TELEPHONE ENCOUNTER
"cALLED Tony WITH [facility] . sHE StATES SHE WAS CALLING JUST TO NOTIFY OF ELEVATED bnp . nO NEW THINGS TO REPORT ON PATIENT. I EXPLAINED THAT IF THE LAB WORK IS ORDERED BY THIS FACILITY THE pcp WILL RECEIVE THE RESULT AND IF ACTION IS WARRANTED WILL COMMUNICATE FOR FURTHER ADVISEMENT.  She states that "we have to notify the Dr of abnormal labs regardless. "  Apologies for Caps.   "

## 2025-02-14 NOTE — FIRST PROVIDER EVALUATION
Medical screening examination initiated.  I have conducted a focused provider triage encounter, findings are as follows:    Brief history of present illness:  patient sent by PCP for a potassium of 2.9. Patient is on potassium and magnesium supplement, last dose this morning. Denies cp, sob.     [facility]mh CHF, Afib, COPD  Dr Recinos  Scheduled for defib on Monday    There were no vitals filed for this visit.    Pertinent physical exam:  awake alert no acute distress    Brief workup plan:  blood work    Preliminary workup initiated; this workup will be continued and followed by the physician or advanced practice provider that is assigned to the patient when roomed.   normal...

## 2025-02-15 ENCOUNTER — HOSPITAL ENCOUNTER (EMERGENCY)
Facility: HOSPITAL | Age: 70
Discharge: HOME | End: 2025-02-15
Attending: INTERNAL MEDICINE
Payer: MEDICARE

## 2025-02-15 VITALS
HEART RATE: 91 BPM | BODY MASS INDEX: 30.93 KG/M2 | OXYGEN SATURATION: 98 % | SYSTOLIC BLOOD PRESSURE: 100 MMHG | WEIGHT: 228.38 LBS | RESPIRATION RATE: 11 BRPM | HEIGHT: 72 IN | TEMPERATURE: 97 F | DIASTOLIC BLOOD PRESSURE: 70 MMHG

## 2025-02-15 DIAGNOSIS — I48.11 LONGSTANDING PERSISTENT ATRIAL FIBRILLATION: ICD-10-CM

## 2025-02-15 DIAGNOSIS — N30.00 ACUTE CYSTITIS WITHOUT HEMATURIA: Primary | ICD-10-CM

## 2025-02-15 DIAGNOSIS — R53.1 WEAKNESS: ICD-10-CM

## 2025-02-15 LAB
ALBUMIN SERPL-MCNC: 4 G/DL (ref 3.4–4.8)
ALBUMIN/GLOB SERPL: 1.1 RATIO (ref 1.1–2)
ALP SERPL-CCNC: 177 UNIT/L (ref 40–150)
ALT SERPL-CCNC: 12 UNIT/L (ref 0–55)
ANION GAP SERPL CALC-SCNC: 9 MEQ/L
AST SERPL-CCNC: 26 UNIT/L (ref 5–34)
BACTERIA #/AREA URNS AUTO: ABNORMAL /HPF
BASOPHILS # BLD AUTO: 0.02 X10(3)/MCL
BASOPHILS NFR BLD AUTO: 0.4 %
BILIRUB SERPL-MCNC: 1.8 MG/DL
BILIRUB UR QL STRIP.AUTO: NEGATIVE
BNP BLD-MCNC: 965.1 PG/ML
BUN SERPL-MCNC: 16.2 MG/DL (ref 8.4–25.7)
CALCIUM SERPL-MCNC: 9.5 MG/DL (ref 8.8–10)
CHLORIDE SERPL-SCNC: 103 MMOL/L (ref 98–107)
CLARITY UR: CLEAR
CO2 SERPL-SCNC: 24 MMOL/L (ref 23–31)
COLOR UR AUTO: COLORLESS
CREAT SERPL-MCNC: 1.04 MG/DL (ref 0.72–1.25)
CREAT/UREA NIT SERPL: 16
EOSINOPHIL # BLD AUTO: 0.13 X10(3)/MCL (ref 0–0.9)
EOSINOPHIL NFR BLD AUTO: 2.3 %
ERYTHROCYTE [DISTWIDTH] IN BLOOD BY AUTOMATED COUNT: 20.1 % (ref 11.5–17)
GFR SERPLBLD CREATININE-BSD FMLA CKD-EPI: >60 ML/MIN/1.73/M2
GLOBULIN SER-MCNC: 3.8 GM/DL (ref 2.4–3.5)
GLUCOSE SERPL-MCNC: 99 MG/DL (ref 82–115)
GLUCOSE UR QL STRIP: NORMAL
HCT VFR BLD AUTO: 33.5 % (ref 42–52)
HGB BLD-MCNC: 10.2 G/DL (ref 14–18)
HGB UR QL STRIP: NEGATIVE
HOLD SPECIMEN: NORMAL
HYALINE CASTS #/AREA URNS LPF: ABNORMAL /LPF
IMM GRANULOCYTES # BLD AUTO: 0.01 X10(3)/MCL (ref 0–0.04)
IMM GRANULOCYTES NFR BLD AUTO: 0.2 %
KETONES UR QL STRIP: NEGATIVE
LEUKOCYTE ESTERASE UR QL STRIP: 250
LYMPHOCYTES # BLD AUTO: 1.35 X10(3)/MCL (ref 0.6–4.6)
LYMPHOCYTES NFR BLD AUTO: 24.2 %
MCH RBC QN AUTO: 25.8 PG (ref 27–31)
MCHC RBC AUTO-ENTMCNC: 30.4 G/DL (ref 33–36)
MCV RBC AUTO: 84.6 FL (ref 80–94)
MONOCYTES # BLD AUTO: 0.84 X10(3)/MCL (ref 0.1–1.3)
MONOCYTES NFR BLD AUTO: 15.1 %
NEUTROPHILS # BLD AUTO: 3.23 X10(3)/MCL (ref 2.1–9.2)
NEUTROPHILS NFR BLD AUTO: 57.8 %
NITRITE UR QL STRIP: NEGATIVE
NRBC BLD AUTO-RTO: 0 %
PH UR STRIP: 5.5 [PH]
PLATELET # BLD AUTO: 222 X10(3)/MCL (ref 130–400)
PMV BLD AUTO: 10.3 FL (ref 7.4–10.4)
POTASSIUM SERPL-SCNC: 4 MMOL/L (ref 3.5–5.1)
PROT SERPL-MCNC: 7.8 GM/DL (ref 5.8–7.6)
PROT UR QL STRIP: NEGATIVE
RBC # BLD AUTO: 3.96 X10(6)/MCL (ref 4.7–6.1)
RBC #/AREA URNS AUTO: ABNORMAL /HPF
SODIUM SERPL-SCNC: 136 MMOL/L (ref 136–145)
SP GR UR STRIP.AUTO: 1 (ref 1–1.03)
SQUAMOUS #/AREA URNS LPF: ABNORMAL /HPF
TROPONIN I SERPL-MCNC: 0.02 NG/ML (ref 0–0.04)
UROBILINOGEN UR STRIP-ACNC: NORMAL
WBC # BLD AUTO: 5.58 X10(3)/MCL (ref 4.5–11.5)
WBC #/AREA URNS AUTO: ABNORMAL /HPF

## 2025-02-15 PROCEDURE — 96361 HYDRATE IV INFUSION ADD-ON: CPT

## 2025-02-15 PROCEDURE — 84484 ASSAY OF TROPONIN QUANT: CPT | Performed by: INTERNAL MEDICINE

## 2025-02-15 PROCEDURE — 99284 EMERGENCY DEPT VISIT MOD MDM: CPT | Mod: 25

## 2025-02-15 PROCEDURE — 80053 COMPREHEN METABOLIC PANEL: CPT | Performed by: INTERNAL MEDICINE

## 2025-02-15 PROCEDURE — 87086 URINE CULTURE/COLONY COUNT: CPT | Performed by: INTERNAL MEDICINE

## 2025-02-15 PROCEDURE — 85025 COMPLETE CBC W/AUTO DIFF WBC: CPT | Performed by: INTERNAL MEDICINE

## 2025-02-15 PROCEDURE — 93005 ELECTROCARDIOGRAM TRACING: CPT

## 2025-02-15 PROCEDURE — 96374 THER/PROPH/DIAG INJ IV PUSH: CPT

## 2025-02-15 PROCEDURE — 81001 URINALYSIS AUTO W/SCOPE: CPT | Performed by: INTERNAL MEDICINE

## 2025-02-15 PROCEDURE — 25000003 PHARM REV CODE 250: Performed by: INTERNAL MEDICINE

## 2025-02-15 PROCEDURE — 83880 ASSAY OF NATRIURETIC PEPTIDE: CPT | Performed by: INTERNAL MEDICINE

## 2025-02-15 PROCEDURE — 63600175 PHARM REV CODE 636 W HCPCS: Performed by: INTERNAL MEDICINE

## 2025-02-15 PROCEDURE — 87040 BLOOD CULTURE FOR BACTERIA: CPT | Performed by: INTERNAL MEDICINE

## 2025-02-15 RX ORDER — CEFTRIAXONE 1 G/1
1 INJECTION, POWDER, FOR SOLUTION INTRAMUSCULAR; INTRAVENOUS
Status: COMPLETED | OUTPATIENT
Start: 2025-02-15 | End: 2025-02-15

## 2025-02-15 RX ORDER — AMOXICILLIN AND CLAVULANATE POTASSIUM 500; 125 MG/1; MG/1
1 TABLET, FILM COATED ORAL 2 TIMES DAILY
Qty: 14 TABLET | Refills: 0 | Status: SHIPPED | OUTPATIENT
Start: 2025-02-15 | End: 2025-02-22

## 2025-02-15 RX ADMIN — CEFTRIAXONE SODIUM 1 G: 1 INJECTION, POWDER, FOR SOLUTION INTRAMUSCULAR; INTRAVENOUS at 03:02

## 2025-02-15 RX ADMIN — SODIUM CHLORIDE 250 ML: 9 INJECTION, SOLUTION INTRAVENOUS at 03:02

## 2025-02-15 NOTE — ED PROVIDER NOTES
Encounter Date: 2/15/2025       History     Chief Complaint   Patient presents with    Weakness    Hypotension     C/o not feeling well since this am. Took bp at hoome and had low bp in the 70s after taking meds. Called ems. Ems with bp in 90s. Bp at present 95/69 with 200 cc ivf given n route. C/o weakness. Scheduled for pacemaker insertion this week     Presents by EMS due to hypotension. Pt states his home health nurse took his BP and was low. States he is feeling fine. Hx of A Fib. States schedule for ICD placement this week.     The history is provided by the patient.     Review of patient's allergies indicates:  No Known Allergies  Past Medical History:   Diagnosis Date    A-fib     Anticoagulant long-term use     Aortic aneurysm     Cataract     CHF (congestive heart failure)     Chronic atrial fibrillation     COPD (chronic obstructive pulmonary disease)     Coronary artery disease     HLD (hyperlipidemia)     Hypertension     Mitral regurgitation     PAD (peripheral artery disease)     Primary open angle glaucoma (POAG)      Past Surgical History:   Procedure Laterality Date    ANGIOGRAM, CORONARY, WITH LEFT HEART CATHETERIZATION N/A 03/26/2024    Procedure: Angiogram, Coronary, with Left Heart Cath;  Surgeon: Adriano Montiel MD;  Location: [facility] CATH LAB;  Service: Cardiology;  Laterality: N/A;    ATHERECTOMY OF PERIPHERAL VESSEL Left 09/12/2022    LEFT SFA ATHERECTOMY, BALLOON ANGIOPLASTY    CATARACT EXTRACTION W/  INTRAOCULAR LENS IMPLANT Left 03/09/2023    Procedure: EXTRACTION, CATARACT, WITH IOL INSERTION;  Surgeon: Georgi Borja MD;  Location: [facility] OR;  Service: Ophthalmology;  Laterality: Left;  19.5  mac    EGD, WITH CLOSED BIOPSY  03/22/2024    Procedure: EGD, WITH CLOSED BIOPSY;  Surgeon: Ronald Calderon MD;  Location: [facility] ENDOSCOPY;  Service: Gastroenterology;;    ESOPHAGOGASTRODUODENOSCOPY N/A 03/22/2024    Procedure: EGD;  Surgeon: Ronald Calderon MD;  Location: [facility]  [facility] ENDOSCOPY;  Service: Gastroenterology;  Laterality: N/A;    Heart Stent N/A     > 10yrs. AGO    INCISION AND DRAINAGE N/A 03/18/2024    Procedure: Incision and Drainage;  Surgeon: Fahad Rivas MD;  Location: [facility] OR;  Service: Urology;  Laterality: N/A;  I&D SCROTAL ABSCESS    INSERTION OF STENT INTO PERIPHERAL VESSEL Right 10/17/2022    RIGHT SFA ATHERECTOMY, BALLOON ANGIOPLASTY, STENT; RIGHT ANTERIOR TIBIAL ARTERY ATHERECTOMY, BALLOON ANGIOPLASTY    ORCHIECTOMY Left 03/18/2024    Procedure: ORCHIECTOMY;  Surgeon: Fahad Rivas MD;  Location: [facility] OR;  Service: Urology;  Laterality: Left;     Family History   Problem Relation Name Age of Onset    Cancer Mother      Hypertension Mother      Heart failure Father      Stroke Father      Hypertension Father      No Known Problems Sister       Social History[1]  Review of Systems   All other systems reviewed and are negative.      Physical Exam     Initial Vitals [02/15/25 1410]   BP Pulse Resp Temp SpO2   95/69 61 20 97.2 °F (36.2 °C) 97 %      MAP       --         Physical Exam    Nursing note and vitals reviewed.  Constitutional: He appears well-developed and well-nourished. No distress.   HENT:   Head: Normocephalic and atraumatic.   Eyes: Conjunctivae and EOM are normal. Pupils are equal, round, and reactive to light.   Neck: Neck supple. No thyromegaly present. No JVD present.   Normal range of motion.  Cardiovascular:  Normal rate, normal heart sounds and intact distal pulses.           Irregular Irregular   Pulmonary/Chest: Breath sounds normal. No respiratory distress.   Abdominal: Abdomen is soft. Bowel sounds are normal. He exhibits no distension. There is no abdominal tenderness. There is no rebound and no guarding.   Musculoskeletal:         General: No edema. Normal range of motion.      Cervical back: Normal range of motion and neck supple.     Neurological: He is alert and oriented to person, place, and time. He has normal strength.  GCS score is 15. GCS eye subscore is 4. GCS verbal subscore is 5. GCS motor subscore is 6.   Skin: Skin is warm and dry. No rash noted.   Psychiatric: His behavior is normal. Thought content normal.         ED Course   Procedures  Labs Reviewed   COMPREHENSIVE METABOLIC PANEL - Abnormal       Result Value    Sodium 136      Potassium 4.0      Chloride 103      CO2 24      Glucose 99      Blood Urea Nitrogen 16.2      Creatinine 1.04      Calcium 9.5      Protein Total 7.8 (*)     Albumin 4.0      Globulin 3.8 (*)     Albumin/Globulin Ratio 1.1      Bilirubin Total 1.8 (*)      (*)     ALT 12      AST 26      eGFR >60      Anion Gap 9.0      BUN/Creatinine Ratio 16     B-TYPE NATRIURETIC PEPTIDE - Abnormal    Natriuretic Peptide 965.1 (*)    URINALYSIS, REFLEX TO URINE CULTURE - Abnormal    Color, UA Colorless (*)     Appearance, UA Clear      Specific Gravity, UA 1.005      pH, UA 5.5      Protein, UA Negative      Glucose, UA Normal      Ketones, UA Negative      Blood, UA Negative      Bilirubin, UA Negative      Urobilinogen, UA Normal      Nitrites, UA Negative      Leukocyte Esterase,  (*)     RBC, UA 0-5      WBC, UA 21-50 (*)     Bacteria, UA Trace (*)     Squamous Epithelial Cells, UA None Seen      Hyaline Casts, UA None Seen     CBC WITH DIFFERENTIAL - Abnormal    WBC 5.58      RBC 3.96 (*)     Hgb 10.2 (*)     Hct 33.5 (*)     MCV 84.6      MCH 25.8 (*)     MCHC 30.4 (*)     RDW 20.1 (*)     Platelet 222      MPV 10.3      Neut % 57.8      Lymph % 24.2      Mono % 15.1      Eos % 2.3      Basophil % 0.4      Imm Grans % 0.2      Neut # 3.23      Lymph # 1.35      Mono # 0.84      Eos # 0.13      Baso # 0.02      Imm Gran # 0.01      NRBC% 0.0     TROPONIN I - Normal    Troponin-I 0.020     CULTURE, URINE   BLOOD CULTURE [facility]   CBC W/ AUTO DIFFERENTIAL    Narrative:     The following orders were created for panel order CBC auto differential.  Procedure                               Abnormality          Status                     ---------                               -----------         ------                     CBC with Differential[[id number]]       Abnormal            Final result                 Please view results for these tests on the individual orders.   EXTRA TUBES    Narrative:     The following orders were created for panel order EXTRA TUBES.  Procedure                               Abnormality         Status                     ---------                               -----------         ------                     Light Blue Top Hold[[id number]]                             Final result               Red Top Hold[[id number]]                                    Final result               Gold Top Hold[[id number]]                                   Final result                 Please view results for these tests on the individual orders.   LIGHT BLUE TOP HOLD    Extra Tube Hold for add-ons.     RED TOP HOLD    Extra Tube Hold for add-ons.     GOLD TOP HOLD    Extra Tube Hold for add-ons.       EKG Readings: (Independently Interpreted)   Initial Reading: No STEMI. Previous EKG Date: New T wave inversion lateral leads as compared from EKG 12/24. Rhythm: Atrial Fibrillation. Heart Rate: 70. Ectopy: No Ectopy. Conduction: Normal. ST Segments: Normal ST Segments. T Waves Flipped: I and AVL. Axis: Normal. Clinical Impression: Atrial Fibrillation     ECG Results              EKG 12-lead (Weakness) Age > 50 (In process)        Collection Time Result Time QRS Duration O[facility] QTC Calculation    02/15/25 14:18:04 02/15/25 14:21:48 94 462                     In process by Interface, Lab In [facility] (02/15/25 14:21:56)                   Narrative:    Test Reason : R53.1,    Vent. Rate :  71 BPM     Atrial Rate : 156 BPM     P-R Int :    ms          QRS Dur :  94 ms      QT Int : 426 ms       P-R-T Axes :      3 139 degrees    QTcB Int : 462 ms    Atrial fibrillation  Possible Anterior infarct (cited on or before  16-Dec-2024)  Abnormal ECG  When compared with ECG of 16-Dec-2024 12:43,  Nonspecific T wave abnormality now evident in Inferior leads    Referred By:            Confirmed By:                                   Imaging Results    None          Medications   sodium chloride 0.9% bolus 250 mL 250 mL (250 mLs Intravenous New Bag 2/15/25 1514)   cefTRIAXone injection 1 g (1 g Intravenous Given 2/15/25 1547)     Medical Decision Making  Amount and/or Complexity of Data Reviewed  Independent Historian: EMS     Details: 250 ml NS given  Labs: ordered. Decision-making details documented in ED Course.  ECG/medicine tests: ordered and independent interpretation performed. Decision-making details documented in ED Course.    Risk  Prescription drug management.                        4:00 PM    Pt have remain stable during ED observation period.              Clinical Impression:  Final diagnoses:  [R53.1] Weakness  [N30.00] Acute cystitis without hematuria (Primary)  [I48.11] Longstanding persistent atrial fibrillation          ED Disposition Condition    Discharge Stable          ED Prescriptions       Medication Sig Dispense Start Date End Date Auth. Provider    amoxicillin-clavulanate 500-125mg (AUGMENTIN) 500-125 mg Tab Take 1 tablet (500 mg total) by mouth 2 (two) times daily. for 7 days 14 tablet 2/15/2025 2/22/2025 Obie Wright MD          Follow-up Information       Follow up With Specialties Details Why Contact Info    Abiodun Recinos DO Internal Medicine Schedule an appointment as soon as possible for a visit in 2 weeks  [address] [facility] [address]  [phone]      [facility] Emergency Medicine  If symptoms worsen [facility] [facility] [address] [phone]                 [1]   Social History  Tobacco Use    Smoking status: Every Day     Current packs/day: 0.25     Average packs/day: 0.3 packs/day for 40.0 years (10.0 ttl pk-yrs)     Types:  Cigarettes     Passive exposure: Current    Smokeless tobacco: Never    Tobacco comments:     States smoke 2-3 cigarettes per day   Substance Use Topics    Alcohol use: Yes     Alcohol/week: 3.0 standard drinks of alcohol     Types: 3 Cans of beer per week     Comment: socially    Drug use: Not Currently     Frequency: 1.0 times per week     Types: Marijuana     Comment: no use in over 1 year        Obie Wright MD  02/15/25 0712

## 2025-02-17 ENCOUNTER — RESULTS FOLLOW-UP (OUTPATIENT)
Dept: EMERGENCY MEDICINE | Facility: HOSPITAL | Age: 70
End: 2025-02-17

## 2025-02-17 LAB
BACTERIA UR CULT: ABNORMAL
OHS QRS DURATION: 94 MS
OHS QTC CALCULATION: 462 MS

## 2025-02-18 NOTE — PROGRESS NOTES
Patient contacted informed of new antibiotic and to stop taking augmentin, request meds called in at [facility]

## 2025-02-20 LAB — BACTERIA BLD CULT: NORMAL

## 2025-02-21 ENCOUNTER — DOCUMENT SCAN (OUTPATIENT)
Dept: HOME HEALTH SERVICES | Facility: HOSPITAL | Age: 70
End: 2025-02-21
Payer: MEDICARE

## 2025-02-21 DIAGNOSIS — Z87.891 PERSONAL HISTORY OF TOBACCO USE, PRESENTING HAZARDS TO HEALTH: Primary | ICD-10-CM

## 2025-02-26 ENCOUNTER — HOSPITAL ENCOUNTER (INPATIENT)
Facility: HOSPITAL | Age: 70
LOS: 6 days | Discharge: HOME HEALTH | DRG: 291 | End: 2025-03-04
Attending: EMERGENCY MEDICINE | Admitting: INTERNAL MEDICINE
Payer: OTHER GOVERNMENT

## 2025-02-26 DIAGNOSIS — F17.200 TOBACCO DEPENDENCY: ICD-10-CM

## 2025-02-26 DIAGNOSIS — I50.9 CHF (CONGESTIVE HEART FAILURE): ICD-10-CM

## 2025-02-26 DIAGNOSIS — R10.9 ABDOMINAL PAIN: ICD-10-CM

## 2025-02-26 DIAGNOSIS — R10.11 RIGHT UPPER QUADRANT ABDOMINAL PAIN: Primary | ICD-10-CM

## 2025-02-26 DIAGNOSIS — I50.9 HEART FAILURE, UNSPECIFIED HF CHRONICITY, UNSPECIFIED HEART FAILURE TYPE: ICD-10-CM

## 2025-02-26 DIAGNOSIS — R14.0 ABDOMINAL BLOATING: ICD-10-CM

## 2025-02-26 DIAGNOSIS — R07.9 CHEST PAIN: ICD-10-CM

## 2025-02-26 DIAGNOSIS — I50.9 CHRONIC CONGESTIVE HEART FAILURE, UNSPECIFIED HEART FAILURE TYPE: ICD-10-CM

## 2025-02-26 DIAGNOSIS — I49.9 ARRHYTHMIA: ICD-10-CM

## 2025-02-26 DIAGNOSIS — R00.1 BRADYCARDIA: ICD-10-CM

## 2025-02-26 LAB
ALBUMIN SERPL-MCNC: 4.3 G/DL (ref 3.4–4.8)
ALBUMIN/GLOB SERPL: 1.2 RATIO (ref 1.1–2)
ALP SERPL-CCNC: 159 UNIT/L (ref 40–150)
ALT SERPL-CCNC: 90 UNIT/L (ref 0–55)
ANION GAP SERPL CALC-SCNC: 10 MEQ/L
AST SERPL-CCNC: 137 UNIT/L (ref 5–34)
BACTERIA #/AREA URNS AUTO: ABNORMAL /HPF
BASOPHILS # BLD AUTO: 0.02 X10(3)/MCL
BASOPHILS NFR BLD AUTO: 0.4 %
BILIRUB SERPL-MCNC: 3.4 MG/DL
BILIRUB UR QL STRIP.AUTO: NEGATIVE
BNP BLD-MCNC: 2509.6 PG/ML
BUN SERPL-MCNC: 33.7 MG/DL (ref 8.4–25.7)
CALCIUM SERPL-MCNC: 9 MG/DL (ref 8.8–10)
CHLORIDE SERPL-SCNC: 104 MMOL/L (ref 98–107)
CLARITY UR: ABNORMAL
CO2 SERPL-SCNC: 16 MMOL/L (ref 23–31)
COLOR UR AUTO: YELLOW
CREAT SERPL-MCNC: 1.33 MG/DL (ref 0.72–1.25)
CREAT/UREA NIT SERPL: 25
EOSINOPHIL # BLD AUTO: 0.03 X10(3)/MCL (ref 0–0.9)
EOSINOPHIL NFR BLD AUTO: 0.5 %
ERYTHROCYTE [DISTWIDTH] IN BLOOD BY AUTOMATED COUNT: 19.9 % (ref 11.5–17)
GFR SERPLBLD CREATININE-BSD FMLA CKD-EPI: 59 ML/MIN/1.73/M2
GLOBULIN SER-MCNC: 3.6 GM/DL (ref 2.4–3.5)
GLUCOSE SERPL-MCNC: 115 MG/DL (ref 82–115)
GLUCOSE UR QL STRIP: NORMAL
HCT VFR BLD AUTO: 28.3 % (ref 42–52)
HGB BLD-MCNC: 8.8 G/DL (ref 14–18)
HGB UR QL STRIP: ABNORMAL
HOLD SPECIMEN: NORMAL
HYALINE CASTS #/AREA URNS LPF: ABNORMAL /LPF
IMM GRANULOCYTES # BLD AUTO: 0.01 X10(3)/MCL (ref 0–0.04)
IMM GRANULOCYTES NFR BLD AUTO: 0.2 %
KETONES UR QL STRIP: NEGATIVE
LEUKOCYTE ESTERASE UR QL STRIP: 500
LIPASE SERPL-CCNC: 16 U/L
LYMPHOCYTES # BLD AUTO: 0.63 X10(3)/MCL (ref 0.6–4.6)
LYMPHOCYTES NFR BLD AUTO: 11.4 %
MAGNESIUM SERPL-MCNC: 2.3 MG/DL (ref 1.6–2.6)
MCH RBC QN AUTO: 25.5 PG (ref 27–31)
MCHC RBC AUTO-ENTMCNC: 31.1 G/DL (ref 33–36)
MCV RBC AUTO: 82 FL (ref 80–94)
MONOCYTES # BLD AUTO: 0.83 X10(3)/MCL (ref 0.1–1.3)
MONOCYTES NFR BLD AUTO: 15.1 %
MUCOUS THREADS URNS QL MICRO: ABNORMAL /LPF
NEUTROPHILS # BLD AUTO: 3.99 X10(3)/MCL (ref 2.1–9.2)
NEUTROPHILS NFR BLD AUTO: 72.4 %
NITRITE UR QL STRIP: NEGATIVE
NRBC BLD AUTO-RTO: 0 %
PH UR STRIP: 5 [PH]
PLATELET # BLD AUTO: 167 X10(3)/MCL (ref 130–400)
PMV BLD AUTO: 11.2 FL (ref 7.4–10.4)
POTASSIUM SERPL-SCNC: 4.6 MMOL/L (ref 3.5–5.1)
PROT SERPL-MCNC: 7.9 GM/DL (ref 5.8–7.6)
PROT UR QL STRIP: NEGATIVE
RBC # BLD AUTO: 3.45 X10(6)/MCL (ref 4.7–6.1)
RBC #/AREA URNS AUTO: ABNORMAL /HPF
SODIUM SERPL-SCNC: 130 MMOL/L (ref 136–145)
SP GR UR STRIP.AUTO: 1.01 (ref 1–1.03)
SQUAMOUS #/AREA URNS LPF: ABNORMAL /HPF
UROBILINOGEN UR STRIP-ACNC: NORMAL
WBC # BLD AUTO: 5.51 X10(3)/MCL (ref 4.5–11.5)
WBC #/AREA URNS AUTO: ABNORMAL /HPF

## 2025-02-26 PROCEDURE — 36415 COLL VENOUS BLD VENIPUNCTURE: CPT | Performed by: INTERNAL MEDICINE

## 2025-02-26 PROCEDURE — 87040 BLOOD CULTURE FOR BACTERIA: CPT

## 2025-02-26 PROCEDURE — 87186 SC STD MICRODIL/AGAR DIL: CPT

## 2025-02-26 PROCEDURE — A4216 STERILE WATER/SALINE, 10 ML: HCPCS

## 2025-02-26 PROCEDURE — 25000003 PHARM REV CODE 250: Performed by: EMERGENCY MEDICINE

## 2025-02-26 PROCEDURE — 83880 ASSAY OF NATRIURETIC PEPTIDE: CPT

## 2025-02-26 PROCEDURE — 83735 ASSAY OF MAGNESIUM: CPT | Performed by: EMERGENCY MEDICINE

## 2025-02-26 PROCEDURE — 80053 COMPREHEN METABOLIC PANEL: CPT | Performed by: EMERGENCY MEDICINE

## 2025-02-26 PROCEDURE — 83690 ASSAY OF LIPASE: CPT | Performed by: EMERGENCY MEDICINE

## 2025-02-26 PROCEDURE — 25000003 PHARM REV CODE 250

## 2025-02-26 PROCEDURE — 93005 ELECTROCARDIOGRAM TRACING: CPT

## 2025-02-26 PROCEDURE — 21400001 HC TELEMETRY ROOM

## 2025-02-26 PROCEDURE — 36415 COLL VENOUS BLD VENIPUNCTURE: CPT

## 2025-02-26 PROCEDURE — 85025 COMPLETE CBC W/AUTO DIFF WBC: CPT | Performed by: EMERGENCY MEDICINE

## 2025-02-26 PROCEDURE — 63600175 PHARM REV CODE 636 W HCPCS

## 2025-02-26 PROCEDURE — 81001 URINALYSIS AUTO W/SCOPE: CPT

## 2025-02-26 RX ORDER — ONDANSETRON HYDROCHLORIDE 2 MG/ML
4 INJECTION, SOLUTION INTRAVENOUS EVERY 8 HOURS PRN
Status: DISCONTINUED | OUTPATIENT
Start: 2025-02-26 | End: 2025-03-04 | Stop reason: HOSPADM

## 2025-02-26 RX ORDER — POTASSIUM CHLORIDE 20 MEQ/1
20 TABLET, EXTENDED RELEASE ORAL 2 TIMES DAILY
Status: DISCONTINUED | OUTPATIENT
Start: 2025-02-26 | End: 2025-03-01

## 2025-02-26 RX ORDER — IPRATROPIUM BROMIDE AND ALBUTEROL SULFATE 2.5; .5 MG/3ML; MG/3ML
3 SOLUTION RESPIRATORY (INHALATION) EVERY 6 HOURS PRN
Status: DISCONTINUED | OUTPATIENT
Start: 2025-02-26 | End: 2025-03-04 | Stop reason: HOSPADM

## 2025-02-26 RX ORDER — FUROSEMIDE 10 MG/ML
80 INJECTION INTRAMUSCULAR; INTRAVENOUS ONCE
Status: DISCONTINUED | OUTPATIENT
Start: 2025-02-26 | End: 2025-02-26

## 2025-02-26 RX ORDER — ALBUTEROL SULFATE 90 UG/1
2 INHALANT RESPIRATORY (INHALATION) EVERY 6 HOURS PRN
Status: DISCONTINUED | OUTPATIENT
Start: 2025-02-26 | End: 2025-03-04 | Stop reason: HOSPADM

## 2025-02-26 RX ORDER — IBUPROFEN 200 MG
1 TABLET ORAL DAILY PRN
Status: DISCONTINUED | OUTPATIENT
Start: 2025-02-26 | End: 2025-03-04 | Stop reason: HOSPADM

## 2025-02-26 RX ORDER — FOLIC ACID 1 MG/1
1 TABLET ORAL DAILY
Status: DISCONTINUED | OUTPATIENT
Start: 2025-02-27 | End: 2025-03-04 | Stop reason: HOSPADM

## 2025-02-26 RX ORDER — FAMOTIDINE 20 MG/1
20 TABLET, FILM COATED ORAL 2 TIMES DAILY PRN
Status: DISCONTINUED | OUTPATIENT
Start: 2025-02-26 | End: 2025-03-04 | Stop reason: HOSPADM

## 2025-02-26 RX ORDER — ONDANSETRON 4 MG/1
4 TABLET, ORALLY DISINTEGRATING ORAL
Status: COMPLETED | OUTPATIENT
Start: 2025-02-26 | End: 2025-02-26

## 2025-02-26 RX ORDER — SACUBITRIL AND VALSARTAN 24; 26 MG/1; MG/1
2 TABLET, FILM COATED ORAL 2 TIMES DAILY
Status: DISCONTINUED | OUTPATIENT
Start: 2025-02-26 | End: 2025-02-28

## 2025-02-26 RX ORDER — CETIRIZINE HYDROCHLORIDE 10 MG/1
10 TABLET ORAL DAILY
Status: DISCONTINUED | OUTPATIENT
Start: 2025-02-27 | End: 2025-03-04 | Stop reason: HOSPADM

## 2025-02-26 RX ORDER — MEROPENEM 1 G/1
1 INJECTION, POWDER, FOR SOLUTION INTRAVENOUS
Status: DISCONTINUED | OUTPATIENT
Start: 2025-02-26 | End: 2025-02-27

## 2025-02-26 RX ORDER — FUROSEMIDE 10 MG/ML
40 INJECTION INTRAMUSCULAR; INTRAVENOUS
Status: DISCONTINUED | OUTPATIENT
Start: 2025-02-27 | End: 2025-02-28

## 2025-02-26 RX ORDER — CLOPIDOGREL BISULFATE 75 MG/1
75 TABLET ORAL DAILY
Status: DISCONTINUED | OUTPATIENT
Start: 2025-02-27 | End: 2025-03-04 | Stop reason: HOSPADM

## 2025-02-26 RX ORDER — ACETAMINOPHEN 325 MG/1
650 TABLET ORAL EVERY 8 HOURS PRN
Status: DISCONTINUED | OUTPATIENT
Start: 2025-02-26 | End: 2025-03-04 | Stop reason: HOSPADM

## 2025-02-26 RX ORDER — CHOLECALCIFEROL (VITAMIN D3) 25 MCG
1000 TABLET ORAL DAILY
Status: DISCONTINUED | OUTPATIENT
Start: 2025-02-27 | End: 2025-03-04 | Stop reason: HOSPADM

## 2025-02-26 RX ORDER — FUROSEMIDE 10 MG/ML
40 INJECTION INTRAMUSCULAR; INTRAVENOUS EVERY MORNING
Status: DISCONTINUED | OUTPATIENT
Start: 2025-02-27 | End: 2025-02-27

## 2025-02-26 RX ORDER — SPIRONOLACTONE 25 MG/1
25 TABLET ORAL DAILY
Status: DISCONTINUED | OUTPATIENT
Start: 2025-02-27 | End: 2025-02-26

## 2025-02-26 RX ORDER — LABETALOL HCL 20 MG/4 ML
20 SYRINGE (ML) INTRAVENOUS EVERY 6 HOURS PRN
Status: DISCONTINUED | OUTPATIENT
Start: 2025-02-26 | End: 2025-03-04 | Stop reason: HOSPADM

## 2025-02-26 RX ORDER — POLYETHYLENE GLYCOL 3350 17 G/17G
17 POWDER, FOR SOLUTION ORAL DAILY
Status: DISCONTINUED | OUTPATIENT
Start: 2025-02-27 | End: 2025-02-28

## 2025-02-26 RX ORDER — PANTOPRAZOLE SODIUM 40 MG/1
40 TABLET, DELAYED RELEASE ORAL 2 TIMES DAILY
Status: DISCONTINUED | OUTPATIENT
Start: 2025-02-26 | End: 2025-03-02

## 2025-02-26 RX ORDER — FUROSEMIDE 10 MG/ML
80 INJECTION INTRAMUSCULAR; INTRAVENOUS ONCE
Status: COMPLETED | OUTPATIENT
Start: 2025-02-26 | End: 2025-02-26

## 2025-02-26 RX ORDER — ONDANSETRON HYDROCHLORIDE 2 MG/ML
8 INJECTION, SOLUTION INTRAVENOUS EVERY 8 HOURS PRN
Status: DISCONTINUED | OUTPATIENT
Start: 2025-02-26 | End: 2025-03-04 | Stop reason: HOSPADM

## 2025-02-26 RX ORDER — TALC
6 POWDER (GRAM) TOPICAL NIGHTLY PRN
Status: DISCONTINUED | OUTPATIENT
Start: 2025-02-26 | End: 2025-03-04 | Stop reason: HOSPADM

## 2025-02-26 RX ORDER — SODIUM CHLORIDE 0.9 % (FLUSH) 0.9 %
10 SYRINGE (ML) INJECTION
Status: DISCONTINUED | OUTPATIENT
Start: 2025-02-26 | End: 2025-03-04 | Stop reason: HOSPADM

## 2025-02-26 RX ORDER — ATORVASTATIN CALCIUM 40 MG/1
80 TABLET, FILM COATED ORAL DAILY
Status: DISCONTINUED | OUTPATIENT
Start: 2025-02-27 | End: 2025-03-04 | Stop reason: HOSPADM

## 2025-02-26 RX ADMIN — ONDANSETRON 4 MG: 4 TABLET, ORALLY DISINTEGRATING ORAL at 02:02

## 2025-02-26 RX ADMIN — Medication 10 ML: at 07:02

## 2025-02-26 RX ADMIN — Medication 10 ML: at 08:02

## 2025-02-26 RX ADMIN — POTASSIUM CHLORIDE 20 MEQ: 1500 TABLET, EXTENDED RELEASE ORAL at 08:02

## 2025-02-26 RX ADMIN — PANTOPRAZOLE SODIUM 40 MG: 40 TABLET, DELAYED RELEASE ORAL at 08:02

## 2025-02-26 RX ADMIN — SACUBITRIL AND VALSARTAN 2 TABLET: 24; 26 TABLET, FILM COATED ORAL at 08:02

## 2025-02-26 RX ADMIN — FUROSEMIDE 80 MG: 10 INJECTION, SOLUTION INTRAMUSCULAR; INTRAVENOUS at 07:02

## 2025-02-26 RX ADMIN — MEROPENEM 1 G: 1 INJECTION, POWDER, FOR SOLUTION INTRAVENOUS at 08:02

## 2025-02-26 NOTE — H&P
[facility] Medicine Wards   History & Physical Note     Resident Team: [facility] Medicine List 3  Attending Physician: Max Boyer MD  Resident: Marcus Juarez MD  Intern: Marino Marquez DO      Date of Admit: 2/26/2025    Chief Complaint:     Abdominal Pain and Nausea (Abdominal pain and nausea since yesterday.  4mg Zofran given by [facility])      Subjective:      History of Present Illness:  Ran Reyes Jr. is a 69 y.o. male with a history of HFrEF (30% 12/2024) with valvular insufficiency, with a history of VT arrest with planned ICD placement, CAD, PVD, HTN, HLD, and COPD who presented to [facility] ED on 2/26/2025  with complaint of abdominal pain, nausea, general malaise.  Patient had a scheduled cardiology follow-up today that he missed due to transportation issues. 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 Patient states that he noted nausea that started 4 days ago with symptoms worsening notably around 24 hours ago.  Patient also states that he had a urinalysis 11 days ago that was positive for UTI and that he did not start antibiotics. The patient arrived in the ED via EMS.  Patient denies chest pain, shortness of breath, fevers.  Patient notes some tingling in his lips and hands.  Patient reports that he has been adherent to his medications including his diuretics.  With nausea, patient's abdomen noted to be distended and painful.  Bilirubin has been elevated in the past and was 1.8 02/15/2025.  Today, 3.4.  Ultrasound abdomen reveals changes to the hepatic and portal veins suggestive of right heart failure.  No wall thickening, pericholecystic fluid, or calculi that were able to be appreciated were noted.  Due to CHF exacerbation, Hospital Medicine was consulted.      Past Medical History:   has a past medical history of A-fib, Anticoagulant long-term use, Aortic aneurysm, Cataract, CHF (congestive heart failure), Chronic atrial fibrillation, COPD (chronic  obstructive pulmonary disease), Coronary artery disease, HLD (hyperlipidemia), Hypertension, Mitral regurgitation, PAD (peripheral artery disease), and Primary open angle glaucoma (POAG).     Past Surgical History:   has a past surgical history that includes Heart Stent (N/A); Insertion of stent into peripheral vessel (Right, 10/17/2022); Atherectomy of peripheral vessel (Left, 09/12/2022); Cataract extraction w/  intraocular lens implant (Left, 03/09/2023); Incision and drainage (N/A, 03/18/2024); Orchiectomy (Left, 03/18/2024); Esophagogastroduodenoscopy (N/A, 03/22/2024); egd, with closed biopsy (03/22/2024); and ANGIOGRAM, CORONARY, WITH LEFT HEART CATHETERIZATION (N/A, 03/26/2024).     Family History:  family history includes Cancer in his mother; Heart failure in his father; Hypertension in his father and mother; No Known Problems in his sister; Stroke in his father.     Social History:   reports that he has been smoking cigarettes. He has a 22 pack-year smoking history. He has been exposed to tobacco smoke. He has never used smokeless tobacco. He reports current alcohol use of about 3.0 standard drinks of alcohol per week. He reports that he does not currently use drugs after having used the following drugs: Marijuana. Frequency: 1.00 time per week.     Allergies:  has no known allergies.     Home Medications:  Prior to Admission medications    Medication Sig Start Date End Date Taking? Authorizing Provider   acetaminophen 325 mg Cap Take 650 mg by mouth 2 (two) times daily as needed.    Provider, Historical   ALBUTEROL INHL Inhale 2 puffs into the lungs every 6 (six) hours as needed.    Provider, Historical   albuterol-ipratropium (DUO-NEB) 2.5 mg-0.5 mg/3 mL nebulizer solution Take 3 mLs by nebulization every 6 (six) hours as needed for Wheezing. Rescue 12/9/24   Diana Hassan MD   atorvastatin (LIPITOR) 80 MG tablet Take 1 tablet (80 mg total) by mouth once daily. 8/8/24 8/8/25  Abiodun Recinos,     BREZTRI AEROSPHERE 160-9-4.8 mcg/actuation HFAA Inhale 2 puffs into the lungs 2 (two) times daily. 1/7/25   Provider, Historical   cetirizine (ZYRTEC) 10 MG tablet Take 1 tablet (10 mg total) by mouth once daily. 8/8/24 8/3/25  Abiodun Recinos DO   clopidogreL (PLAVIX) 75 mg tablet Take 75 mg by mouth once daily.    Provider, Historical   ENTRESTO 49-51 mg per tablet Take 1 tablet by mouth 2 (two) times daily. 12/20/24   Provider, Historical   folic acid (FOLVITE) 1 MG tablet Take 1 tablet (1 mg total) by mouth once daily. 4/9/24 4/9/25  Tha Edmond MD   furosemide (LASIX) 40 MG tablet Take 1 tablet (40 mg total) by mouth 2 (two) times a day. 9/23/24   Aung Verduzco MD   metoprolol succinate (TOPROL-XL) 25 MG 24 hr tablet Take 0.5 tablets (12.5 mg total) by mouth once daily. 12/30/24 12/30/25  Shanel Dyer DO   pantoprazole (PROTONIX) 40 MG tablet Take 1 tablet (40 mg total) by mouth 2 (two) times a day. 9/23/24   Abiodun Recinos DO   potassium chloride SA (K-DUR,KLOR-CON) 20 MEQ tablet Take 1 tablet (20 mEq total) by mouth 2 (two) times daily. 1/28/25 1/28/26  Vernon Cifuentes MD   rivaroxaban (XARELTO) 20 mg Tab Take 1 tablet (20 mg total) by mouth Daily. 7/16/24   Joe Clarke MD   spironolactone (ALDACTONE) 25 MG tablet Take 25 mg by mouth once daily. 11/11/24   Provider, Historical   vitamin D (VITAMIN D3) 1000 units Tab Take 1,000 Units by mouth once daily.    Provider, Historical         Review of Systems:  Negative unless otherwise noted above       Objective:     Vital Signs (Most Recent):  Temp: 97.4 °F (36.3 °C) (02/26/25 1630)  Pulse: 70 (02/26/25 1630)  Resp: (!) 22 (02/26/25 1630)  BP: 111/75 (02/26/25 1630)  SpO2: 97 % (02/26/25 1630) Vital Signs (24h Range):  Temp:  [97 °F (36.1 °C)-97.4 °F (36.3 °C)] 97.4 °F (36.3 °C)  Pulse:  [70-86] 70  Resp:  [20-22] 22  SpO2:  [97 %-100 %] 97 %  BP: (111-126)/(75-79) 111/75       Physical Examination:  Physical Exam  Constitutional:        Appearance: He is ill-appearing.   HENT:      Head: Normocephalic.      Mouth/Throat:      Mouth: Mucous membranes are moist.      Pharynx: Oropharynx is clear.   Neck:      Comments: Notable for JVD, could not appreciate carotid bruits  Cardiovascular:      Rate and Rhythm: Normal rate. Rhythm irregular.      Heart sounds: No murmur heard.  Pulmonary:      Effort: Pulmonary effort is normal.      Comments: Crackles noted on auscultation.  Abdominal:      General: Bowel sounds are normal. There is distension.   Musculoskeletal:         General: No swelling.      Cervical back: Neck supple.      Right lower leg: Edema present.      Left lower leg: Edema present.      Comments: 3+ pitting edema noted.   Skin:     General: Skin is warm.      Coloration: Skin is not jaundiced.      Findings: No erythema or rash.   Neurological:      General: No focal deficit present.      Mental Status: He is alert.      Deep Tendon Reflexes: Reflexes normal.   Psychiatric:         Mood and Affect: Mood normal.          Laboratory:  Most Recent Data:  CBC:   Recent Labs   Lab 02/26/25  1417   WBC 5.51   HGB 8.8*   HCT 28.3*        CMP:   Recent Labs   Lab 02/26/25  1417   CALCIUM 9.0   ALBUMIN 4.3   *   K 4.6   CO2 16*      BUN 33.7*   CREATININE 1.33*   ALKPHOS 159*   ALT 90*   *   BILITOT 3.4*         Microbiology Data:  Blood cultures x2, urinalysis, reflex urine culture pending.    Other Results:  EKG (my interpretation): EKG afib without st depressions or elevation. Slightly poor r wave progression    Echo 12/5:  ef 30% pasp 44. All 4 chambers dilated  Known PAD with jairo on 12/6 suggestive of inflow disease.    Radiology:  Imaging Results              US Abdomen Limited (Final result)  Result time 02/26/25 16:19:49      Final result by Sailaja Atkinson MD (02/26/25 16:19:49)                   Impression:      Changes to the hepatic and portal veins suggestive of right heart failure      Electronically  signed by: Sailaja Atkinson  Date:    02/26/2025  Time:    16:19               Narrative:    EXAMINATION:  US ABDOMEN LIMITED    CLINICAL HISTORY:  Transaminitis;    TECHNIQUE:  Limited ultrasound of the right upper quadrant of the abdomen was performed.    COMPARISON:  Abdomen radiograph 02/26/2025    FINDINGS:  LIVER: 17 cm cranial caudal at the midclavicular line. Normal echotexture. No focal mass appreciable. Portal vein is patent with appropriate directional flow.  Increased pulsatility of the portal vein as can be seen with heart failure or cirrhosis.    PANCREAS: Obscured by overlying bowel gas.    GALLBLADDER: No shadowing calculi, wall thickening, or pericholecystic fluid.    BILE DUCTS: 4 mm common bile duct.  Distal common bile duct is obscured by shadowing bowel gas.    INFERIOR VENA CAVA: Normal in appearance.    RIGHT KIDNEY: 9.3 cm. No hydronephrosis.    OTHER: No ascites.  Distension of the IVC and hepatic veins.                                       X-Ray Abdomen AP 1 View (KUB) (Final result)  Result time 02/26/25 14:42:16      Final result by Lito Hurst MD (02/26/25 14:42:16)                   Impression:      Nonspecific gas pattern      Electronically signed by: Lito Hurst  Date:    02/26/2025  Time:    14:42               Narrative:    EXAMINATION:  XR ABDOMEN AP 1 VIEW    CLINICAL HISTORY:  Unspecified abdominal pain    TECHNIQUE:  AP X-RAY OF THE ABDOMEN:    CPT 10039    FINDINGS:  Examination reveals some residual feces throughout the colon the gas pattern is nonspecific with no clear evidence of ileus or obstruction no abnormal masses or calcifications identified                                         Lines/Drains/Airways       Peripheral Intravenous Line  Duration                  Peripheral IV - Single Lumen 20 G Posterior;Right Hand -- days                     Assessment & Plan:     Acute Exacerbation of HFrEF (EF 30%)  A fib, no Xarelto and rate controlled   Hx of CAD  s/p stenting and HLD, HTN  - Most recent TTE on 12/05/2024 demonstrates: LVef 30%, needing ICD placement  - EKG : afib without st depressions or elevation. Slightly poor r wave progression, LA shift   - BNP 2509.6 and CXR pending - Stage D: Refractory HF requiring specialized interventions.  - Fluid restriction at 1200 cc with strict I/Os and daily weights   -  Admit weight 220lbs.  Dry weight was noted at 220  previously. Will assess baseline   - IV diuresis with Lasix 80 mg tonight.  40 mg b.i.d. starting tomorrow.  Goal diuresis 2-3L/day.  Home diuretic dose:  Lasix 40 mg oral b.i.d.  - Maintain on telemetry  - most recent TSH, lipids, A1c at goal.  - Check Electrolytes, keep Mag >2 & K+ >4, at goal  - patient continued on Xarelto 20 b.i.d..  - Ambulate as tolerated    - Optimal therapy at this time to include :    - Anti-platelet agent with clopidogrel 70 mg    - ACE/ARB with Entresto 04/20/2026     - BB metoprolol 12.5 mg b.i.d.    - Statin with atorvastatin 80 mg      - Diuretic dosing of Lasix 80 mg IV initial, 40 mg b.i.d. after.     - spironolactone held due to pressures at this time    - Follow up transitional care visit for heart failure at discharge                          - cardiology consult in place.   -  SGLT2 was not initiated due to history of Claudine's gangrene  - Fluid restriction to 1.5 - 2.0 L/day if NYHA stage D, class VI or if hyponatremia symptomatic or < 120 mEq/L    ESBL UTI  -patient positive for ESBL on 02/15/2025.    -patient has not yet been treated with the appropriate antibiotics.    -blood cultures x2, urinalysis reflex to culture pending.    -started meropenem 1 g q.8 hours at this time.      ELIZABETH  Hyponatremia   Elevated bilirubin   Transaminitis  -ELIZABETH prerenal from CHF.  - Hyponatremia noted, likely dilutional 2/2 fluid status   -transaminitis was noted without phos suggestive of hepatic biliary congestion.    -we will do sound abdomen revealed changes of the hepatic and portal  veins suggestive of right heart failure.    -acidosis likely secondary to volume overload.    PAD s/p atherectomy and stenting 2022  -12/06/2020 44 BLE arterial ultrasound consistent with bilateral moderate stenosis with areas of monophasic flow.  Suspicion of venous insufficiency in the past.    -we will continue Plavix 75 mg and Lipitor 80 mg.    -patient is continued on Xarelto 20 mg b.i.d..    Hx of Moderate Normocytic Anemia  Hx of iron deficiency  -patient's H&H 8.3 and 28.3 today, likely dilution component as well from patient's current fluid status.    -continue to follow as patient is diuresed.    History of COPD  -p.r.n. DuoNebs q.4 hours.   -p.r.n. albuterol 2 puffs q.6 hours p.r.n.   -saturating 95 97% on room air.    -p.r.n. oxygen N/C      CODE STATUS: Full Code  Access: Peripheral  Antibiotics:  Meropenem 1 g q.8  Diet: Cardiac fluid restriction 1200 mL  DVT Prophylaxis:  Continued on Xarelto at this time.  GI Prophylaxis:  Ondansetron 4 mg 1st, 8 mg 2nd, famotidine  Fluids:  Restricted and as above      Disposition: day 0 of admission for CHF exacerbation.  We will initiate on IV diuretics at this time continue to monitor.  Patient was also positive for ESBL.  Meropenem started today.    Marino Marquez DO  [employer], Internal Medicine, PGY-I

## 2025-02-26 NOTE — ED NOTES
Pt reports being notified on 2/18/25 that he needed to stop taking Augmentin and to start taking Macrobid for his UTI. This was called into [facility]. Pt reports that due to transportation limitations he was unable to  medicine until today, 2/26/25. He told this nurse his sister picked them up today and are waiting for him at home.

## 2025-02-26 NOTE — ED PROVIDER NOTES
Encounter Date: 2/26/2025       History     Chief Complaint   Patient presents with    Abdominal Pain    Nausea     Abdominal pain and nausea since yesterday.  4mg Zofran given by AASMATEO     Patient was supposed to have a Cardiology consultation regards to an ICU eat today missed his ride and was not able to get to the appointment but presents here via EMS for abdominal bloating and intermittent diarrhea.  No chest pain shortness breath fevers illnesses.  He does have tingling sometimes around both of his sides of his lips but no upper or lower extremity weakness or sensory deficits.  No black or red stool patient has a history of AFib and has been taking his Xarelto as prescribed.  Patient was diagnosed with urinalysis on 2/15, sensitivities were resistant to penicillins and was called by case management and medications were changed to Macrobid however he would not pick these up he had not started taking them.      Review of patient's allergies indicates:  No Known Allergies  Past Medical History:   Diagnosis Date    A-fib     Anticoagulant long-term use     Aortic aneurysm     Cataract     CHF (congestive heart failure)     Chronic atrial fibrillation     COPD (chronic obstructive pulmonary disease)     Coronary artery disease     HLD (hyperlipidemia)     Hypertension     Mitral regurgitation     PAD (peripheral artery disease)     Primary open angle glaucoma (POAG)      Past Surgical History:   Procedure Laterality Date    ANGIOGRAM, CORONARY, WITH LEFT HEART CATHETERIZATION N/A 03/26/2024    Procedure: Angiogram, Coronary, with Left Heart Cath;  Surgeon: Adriano Montiel MD;  Location: [facility] CATH LAB;  Service: Cardiology;  Laterality: N/A;    ATHERECTOMY OF PERIPHERAL VESSEL Left 09/12/2022    LEFT SFA ATHERECTOMY, BALLOON ANGIOPLASTY    CATARACT EXTRACTION W/  INTRAOCULAR LENS IMPLANT Left 03/09/2023    Procedure: EXTRACTION, CATARACT, WITH IOL INSERTION;  Surgeon: Georgi Borja MD;  Location: [facility] OR;   Service: Ophthalmology;  Laterality: Left;  19.5  mac    EGD, WITH CLOSED BIOPSY  03/22/2024    Procedure: EGD, WITH CLOSED BIOPSY;  Surgeon: Ronald Calderon MD;  Location: [facility] ENDOSCOPY;  Service: Gastroenterology;;    ESOPHAGOGASTRODUODENOSCOPY N/A 03/22/2024    Procedure: EGD;  Surgeon: Ronald Calderon MD;  Location: [facility] ENDOSCOPY;  Service: Gastroenterology;  Laterality: N/A;    Heart Stent N/A     > 10yrs. AGO    INCISION AND DRAINAGE N/A 03/18/2024    Procedure: Incision and Drainage;  Surgeon: Fahad Rivas MD;  Location: [facility] OR;  Service: Urology;  Laterality: N/A;  I&D SCROTAL ABSCESS    INSERTION OF STENT INTO PERIPHERAL VESSEL Right 10/17/2022    RIGHT SFA ATHERECTOMY, BALLOON ANGIOPLASTY, STENT; RIGHT ANTERIOR TIBIAL ARTERY ATHERECTOMY, BALLOON ANGIOPLASTY    ORCHIECTOMY Left 03/18/2024    Procedure: ORCHIECTOMY;  Surgeon: Fahad Rivas MD;  Location: [facility] OR;  Service: Urology;  Laterality: Left;     Family History   Problem Relation Name Age of Onset    Cancer Mother      Hypertension Mother      Heart failure Father      Stroke Father      Hypertension Father      No Known Problems Sister       Social History[1]  Review of Systems   Gastrointestinal:  Positive for abdominal distention and diarrhea.        Abdominal bloating with the intermittent diarrhea   Neurological:         Tingling of the lips intermittently both sides of upper   All other systems reviewed and are negative.      Physical Exam     Initial Vitals [02/26/25 1333]   BP Pulse Resp Temp SpO2   126/79 86 20 97 °F (36.1 °C) 100 %      MAP       --         Physical Exam    Nursing note and vitals reviewed.  Constitutional: He appears well-developed and well-nourished.   HENT:   Head: Normocephalic and atraumatic.   Eyes: EOM are normal. Pupils are equal, round, and reactive to light.   Neck:   Normal range of motion.  Cardiovascular:            Irregularly irregular rhythm   Pulmonary/Chest:  Breath sounds normal. No respiratory distress. He has no wheezes. He has no rales.   Abdominal: Abdomen is soft. Bowel sounds are normal. He exhibits distension. There is no abdominal tenderness.   Obese abdomen but normal bowel sounds and no pain guarding or rebound with deep palpation all quadrants no masses palpated There is no rebound.   Musculoskeletal:         General: Normal range of motion.      Cervical back: Normal range of motion.     Neurological: He is alert and oriented to person, place, and time. He has normal strength. No cranial nerve deficit.   Psychiatric: He has a normal mood and affect.         ED Course   Procedures  Labs Reviewed   COMPREHENSIVE METABOLIC PANEL - Abnormal       Result Value    Sodium 130 (*)     Potassium 4.6      Chloride 104      CO2 16 (*)     Glucose 115      Blood Urea Nitrogen 33.7 (*)     Creatinine 1.33 (*)     Calcium 9.0      Protein Total 7.9 (*)     Albumin 4.3      Globulin 3.6 (*)     Albumin/Globulin Ratio 1.2      Bilirubin Total 3.4 (*)      (*)     ALT 90 (*)      (*)     eGFR 59      Anion Gap 10.0      BUN/Creatinine Ratio 25     CBC WITH DIFFERENTIAL - Abnormal    WBC 5.51      RBC 3.45 (*)     Hgb 8.8 (*)     Hct 28.3 (*)     MCV 82.0      MCH 25.5 (*)     MCHC 31.1 (*)     RDW 19.9 (*)     Platelet 167      MPV 11.2 (*)     Neut % 72.4      Lymph % 11.4      Mono % 15.1      Eos % 0.5      Basophil % 0.4      Imm Grans % 0.2      Neut # 3.99      Lymph # 0.63      Mono # 0.83      Eos # 0.03      Baso # 0.02      Imm Gran # 0.01      NRBC% 0.0     MAGNESIUM - Normal    Magnesium Level 2.30     LIPASE - Normal    Lipase Level 16     CBC W/ AUTO DIFFERENTIAL    Narrative:     The following orders were created for panel order CBC auto differential.  Procedure                               Abnormality         Status                     ---------                               -----------         ------                     CBC with  Differential[[id number]]       Abnormal            Final result                 Please view results for these tests on the individual orders.   EXTRA TUBES    Narrative:     The following orders were created for panel order EXTRA TUBES.  Procedure                               Abnormality         Status                     ---------                               -----------         ------                     Light Blue Top Hold[[id number]]                             Final result               Red Top Hold[[id number]]                                    Final result               Light Green Top Hold[[id number]]                            Final result               Lavender Top Hold[[id number]]                               Final result               Gold Top Hold[[id number]]                                   Final result               Pink Top Hold[[id number]]                                   Final result                 Please view results for these tests on the individual orders.   LIGHT BLUE TOP HOLD    Extra Tube Hold for add-ons.     RED TOP HOLD    Extra Tube Hold for add-ons.     LIGHT GREEN TOP HOLD    Extra Tube Hold for add-ons.     LAVENDER TOP HOLD    Extra Tube Hold for add-ons.     GOLD TOP HOLD    Extra Tube Hold for add-ons.     PINK TOP HOLD    Extra Tube Hold for add-ons.     B-TYPE NATRIURETIC PEPTIDE     EKG Readings: (Independently Interpreted)   Time 5:06 p.m.   Rate 79, atrial fibrillation, nonspecific T-wave abnormalities, normal axis, no STEMI       Imaging Results              US Abdomen Limited (Final result)  Result time 02/26/25 16:19:49      Final result by Sailaja Atkinson MD (02/26/25 16:19:49)                   Impression:      Changes to the hepatic and portal veins suggestive of right heart failure      Electronically signed by: Sailaja Atkinson  Date:    02/26/2025  Time:    16:19               Narrative:    EXAMINATION:  US ABDOMEN LIMITED    CLINICAL  HISTORY:  Transaminitis;    TECHNIQUE:  Limited ultrasound of the right upper quadrant of the abdomen was performed.    COMPARISON:  Abdomen radiograph 02/26/2025    FINDINGS:  LIVER: 17 cm cranial caudal at the midclavicular line. Normal echotexture. No focal mass appreciable. Portal vein is patent with appropriate directional flow.  Increased pulsatility of the portal vein as can be seen with heart failure or cirrhosis.    PANCREAS: Obscured by overlying bowel gas.    GALLBLADDER: No shadowing calculi, wall thickening, or pericholecystic fluid.    BILE DUCTS: 4 mm common bile duct.  Distal common bile duct is obscured by shadowing bowel gas.    INFERIOR VENA CAVA: Normal in appearance.    RIGHT KIDNEY: 9.3 cm. No hydronephrosis.    OTHER: No ascites.  Distension of the IVC and hepatic veins.                                       X-Ray Abdomen AP 1 View (KUB) (Final result)  Result time 02/26/25 14:42:16      Final result by Lito Hurst MD (02/26/25 14:42:16)                   Impression:      Nonspecific gas pattern      Electronically signed by: Lito Hurst  Date:    02/26/2025  Time:    14:42               Narrative:    EXAMINATION:  XR ABDOMEN AP 1 VIEW    CLINICAL HISTORY:  Unspecified abdominal pain    TECHNIQUE:  AP X-RAY OF THE ABDOMEN:    CPT 79783    FINDINGS:  Examination reveals some residual feces throughout the colon the gas pattern is nonspecific with no clear evidence of ileus or obstruction no abnormal masses or calcifications identified                                       Medications   ondansetron disintegrating tablet 4 mg (4 mg Oral Given 2/26/25 1418)     Medical Decision Making  Amount and/or Complexity of Data Reviewed  Labs: ordered.  Radiology: ordered.    Risk  Prescription drug management.  Decision regarding hospitalization.                          Medical Decision Making:   Initial Assessment:   Patient initially has multiple complaints intermittent upper lip tingling  but is on both sides in his intermittent in nature will check patient's electrolytes but due to being on both sides he did not feel this is CVA.  He also has distention bloating intermittent diarrhea will perform KUB to ensure he is not constipated.  He has prescription or filled he states he needs to go to the pharmacy here before they close to  his medications.  He denies any burning with urination his vitals are stable he is not tachycardic or febrile.  We know that culture sensitivities for his urine or sensitive to Macrobid which is at the pharmacy.  He has no flank pain to suggest any obstructive uropathy or no fevers to suggest pyelo.  Differential Diagnosis:   Over fluid constipation, enteritis, intra-abdominal infection, intra-abdominal obstruction, electrolyte derangement CVA  ED Management:  Time 3:27 p.m.   Patient has mild transaminitis slightly changed from his baseline.  Lipase ordered as well as ultrasound of abdomen.    Time 5:02 p.m.   Patient is showing signs of right heart failure and known CHF patient supposed to have ICD placement in March missed his cardiology appointment today.  Will admit to medicine team optimize his CHF medications and consideration for ICD while in the inpatient setting.             Clinical Impression:  Final diagnoses:  [R10.9] Abdominal pain  [R10.9] Abdominal pain - Eval for constipation  [R10.11] Right upper quadrant abdominal pain (Primary)  [R14.0] Abdominal bloating  [I50.9] Heart failure, unspecified HF chronicity, unspecified heart failure type  [I50.9] CHF (congestive heart failure)          ED Disposition Condition    Admit Stable                  Adriano Ragland MD  02/26/25 5873         [1]   Social History  Tobacco Use    Smoking status: Every Day     Current packs/day: 0.50     Average packs/day: 0.5 packs/day for 44.0 years (22.0 ttl pk-yrs)     Types: Cigarettes     Passive exposure: Current    Smokeless tobacco: Never    Tobacco comments:      States smoke 2-3 cigarettes per day   Substance Use Topics    Alcohol use: Yes     Alcohol/week: 3.0 standard drinks of alcohol     Types: 3 Cans of beer per week     Comment: socially    Drug use: Not Currently     Frequency: 1.0 times per week     Types: Marijuana     Comment: no use in over 1 year        Long, Adriano FAYE MD  02/26/25 9623

## 2025-02-27 PROBLEM — R10.9 ABDOMINAL PAIN: Status: ACTIVE | Noted: 2025-02-27

## 2025-02-27 PROBLEM — R14.0 ABDOMINAL BLOATING: Status: ACTIVE | Noted: 2025-02-27

## 2025-02-27 LAB
ANION GAP SERPL CALC-SCNC: 13 MEQ/L
BASOPHILS # BLD AUTO: 0.03 X10(3)/MCL
BASOPHILS NFR BLD AUTO: 0.6 %
BUN SERPL-MCNC: 33.5 MG/DL (ref 8.4–25.7)
CALCIUM SERPL-MCNC: 8.9 MG/DL (ref 8.8–10)
CHLORIDE SERPL-SCNC: 101 MMOL/L (ref 98–107)
CO2 SERPL-SCNC: 20 MMOL/L (ref 23–31)
CREAT SERPL-MCNC: 1.34 MG/DL (ref 0.72–1.25)
CREAT/UREA NIT SERPL: 25
EOSINOPHIL # BLD AUTO: 0.06 X10(3)/MCL (ref 0–0.9)
EOSINOPHIL NFR BLD AUTO: 1.2 %
ERYTHROCYTE [DISTWIDTH] IN BLOOD BY AUTOMATED COUNT: 20 % (ref 11.5–17)
GFR SERPLBLD CREATININE-BSD FMLA CKD-EPI: 58 ML/MIN/1.73/M2
GLUCOSE SERPL-MCNC: 120 MG/DL (ref 82–115)
HCT VFR BLD AUTO: 29.2 % (ref 42–52)
HGB BLD-MCNC: 9 G/DL (ref 14–18)
IMM GRANULOCYTES # BLD AUTO: 0.02 X10(3)/MCL (ref 0–0.04)
IMM GRANULOCYTES NFR BLD AUTO: 0.4 %
LYMPHOCYTES # BLD AUTO: 0.8 X10(3)/MCL (ref 0.6–4.6)
LYMPHOCYTES NFR BLD AUTO: 15.7 %
MCH RBC QN AUTO: 25.6 PG (ref 27–31)
MCHC RBC AUTO-ENTMCNC: 30.8 G/DL (ref 33–36)
MCV RBC AUTO: 83.2 FL (ref 80–94)
MONOCYTES # BLD AUTO: 0.66 X10(3)/MCL (ref 0.1–1.3)
MONOCYTES NFR BLD AUTO: 12.9 %
NEUTROPHILS # BLD AUTO: 3.53 X10(3)/MCL (ref 2.1–9.2)
NEUTROPHILS NFR BLD AUTO: 69.2 %
NRBC BLD AUTO-RTO: 0.4 %
OHS QRS DURATION: 94 MS
OHS QRS DURATION: 96 MS
OHS QTC CALCULATION: 477 MS
OHS QTC CALCULATION: 514 MS
PLATELET # BLD AUTO: 180 X10(3)/MCL (ref 130–400)
PMV BLD AUTO: 11.6 FL (ref 7.4–10.4)
POTASSIUM SERPL-SCNC: 4 MMOL/L (ref 3.5–5.1)
RBC # BLD AUTO: 3.51 X10(6)/MCL (ref 4.7–6.1)
SODIUM SERPL-SCNC: 134 MMOL/L (ref 136–145)
TROPONIN I SERPL-MCNC: 0.02 NG/ML (ref 0–0.04)
WBC # BLD AUTO: 5.1 X10(3)/MCL (ref 4.5–11.5)

## 2025-02-27 PROCEDURE — 94640 AIRWAY INHALATION TREATMENT: CPT

## 2025-02-27 PROCEDURE — 25000242 PHARM REV CODE 250 ALT 637 W/ HCPCS

## 2025-02-27 PROCEDURE — 27000207 HC ISOLATION

## 2025-02-27 PROCEDURE — 85025 COMPLETE CBC W/AUTO DIFF WBC: CPT

## 2025-02-27 PROCEDURE — 80048 BASIC METABOLIC PNL TOTAL CA: CPT

## 2025-02-27 PROCEDURE — 93005 ELECTROCARDIOGRAM TRACING: CPT

## 2025-02-27 PROCEDURE — 94761 N-INVAS EAR/PLS OXIMETRY MLT: CPT

## 2025-02-27 PROCEDURE — 36415 COLL VENOUS BLD VENIPUNCTURE: CPT

## 2025-02-27 PROCEDURE — 25000003 PHARM REV CODE 250

## 2025-02-27 PROCEDURE — 21400001 HC TELEMETRY ROOM

## 2025-02-27 PROCEDURE — 84484 ASSAY OF TROPONIN QUANT: CPT

## 2025-02-27 PROCEDURE — A4216 STERILE WATER/SALINE, 10 ML: HCPCS

## 2025-02-27 PROCEDURE — 63600175 PHARM REV CODE 636 W HCPCS

## 2025-02-27 RX ORDER — FUROSEMIDE 10 MG/ML
20 INJECTION INTRAMUSCULAR; INTRAVENOUS ONCE
Status: COMPLETED | OUTPATIENT
Start: 2025-02-27 | End: 2025-02-27

## 2025-02-27 RX ORDER — CEFEPIME HYDROCHLORIDE 1 G/1
1 INJECTION, POWDER, FOR SOLUTION INTRAMUSCULAR; INTRAVENOUS EVERY 6 HOURS
Status: DISCONTINUED | OUTPATIENT
Start: 2025-02-27 | End: 2025-02-28

## 2025-02-27 RX ORDER — FUROSEMIDE 10 MG/ML
60 INJECTION INTRAMUSCULAR; INTRAVENOUS EVERY MORNING
Status: DISCONTINUED | OUTPATIENT
Start: 2025-02-28 | End: 2025-02-28

## 2025-02-27 RX ADMIN — PANTOPRAZOLE SODIUM 40 MG: 40 TABLET, DELAYED RELEASE ORAL at 08:02

## 2025-02-27 RX ADMIN — IPRATROPIUM BROMIDE AND ALBUTEROL SULFATE 3 ML: .5; 3 SOLUTION RESPIRATORY (INHALATION) at 07:02

## 2025-02-27 RX ADMIN — ACETAMINOPHEN 650 MG: 325 TABLET, FILM COATED ORAL at 04:02

## 2025-02-27 RX ADMIN — CLOPIDOGREL BISULFATE 75 MG: 75 TABLET ORAL at 08:02

## 2025-02-27 RX ADMIN — SACUBITRIL AND VALSARTAN 2 TABLET: 24; 26 TABLET, FILM COATED ORAL at 08:02

## 2025-02-27 RX ADMIN — Medication 10 ML: at 06:02

## 2025-02-27 RX ADMIN — CEFEPIME 1 G: 1 INJECTION, POWDER, FOR SOLUTION INTRAMUSCULAR; INTRAVENOUS at 06:02

## 2025-02-27 RX ADMIN — FUROSEMIDE 20 MG: 10 INJECTION, SOLUTION INTRAMUSCULAR; INTRAVENOUS at 10:02

## 2025-02-27 RX ADMIN — FOLIC ACID 1 MG: 1 TABLET ORAL at 08:02

## 2025-02-27 RX ADMIN — POTASSIUM CHLORIDE 20 MEQ: 1500 TABLET, EXTENDED RELEASE ORAL at 08:02

## 2025-02-27 RX ADMIN — ATORVASTATIN CALCIUM 80 MG: 40 TABLET, FILM COATED ORAL at 08:02

## 2025-02-27 RX ADMIN — NITROGLYCERIN 2 INCH: 20 OINTMENT TOPICAL at 04:02

## 2025-02-27 RX ADMIN — METOPROLOL SUCCINATE 12.5 MG: 25 TABLET, EXTENDED RELEASE ORAL at 08:02

## 2025-02-27 RX ADMIN — Medication 10 ML: at 03:02

## 2025-02-27 RX ADMIN — FUROSEMIDE 40 MG: 10 INJECTION, SOLUTION INTRAMUSCULAR; INTRAVENOUS at 06:02

## 2025-02-27 RX ADMIN — IPRATROPIUM BROMIDE AND ALBUTEROL SULFATE 3 ML: .5; 3 SOLUTION RESPIRATORY (INHALATION) at 12:02

## 2025-02-27 RX ADMIN — CETIRIZINE HYDROCHLORIDE 10 MG: 10 TABLET, FILM COATED ORAL at 08:02

## 2025-02-27 RX ADMIN — ACETAMINOPHEN 650 MG: 325 TABLET, FILM COATED ORAL at 10:02

## 2025-02-27 RX ADMIN — RIVAROXABAN 20 MG: 10 TABLET, FILM COATED ORAL at 05:02

## 2025-02-27 RX ADMIN — CEFEPIME 1 G: 1 INJECTION, POWDER, FOR SOLUTION INTRAMUSCULAR; INTRAVENOUS at 10:02

## 2025-02-27 RX ADMIN — MEROPENEM 1 G: 1 INJECTION, POWDER, FOR SOLUTION INTRAVENOUS at 03:02

## 2025-02-27 RX ADMIN — CHOLECALCIFEROL TAB 25 MCG (1000 UNIT) 1000 UNITS: 25 TAB at 08:02

## 2025-02-27 RX ADMIN — Medication 6 MG: at 10:02

## 2025-02-27 RX ADMIN — FUROSEMIDE 40 MG: 10 INJECTION, SOLUTION INTRAMUSCULAR; INTRAVENOUS at 02:02

## 2025-02-27 RX ADMIN — IPRATROPIUM BROMIDE AND ALBUTEROL SULFATE 3 ML: .5; 3 SOLUTION RESPIRATORY (INHALATION) at 06:02

## 2025-02-27 NOTE — PROGRESS NOTES
Inpatient Nutrition Evaluation    Admit Date: 2/26/2025   Total duration of encounter: 1 day   Patient Age: 69 y.o.    Nutrition Recommendation/Prescription     Continue heart healthy diet/ 1200 ml fluid restriction  Pt education on diet/complete  MVI/fe  Biweekly wt  Will monitor nutrition status     Nutrition Assessment     Chart Review    Reason Seen: continuous nutrition monitoring    Malnutrition Screening Tool Results   Have you recently lost weight without trying?: No  Have you been eating poorly because of a decreased appetite?: No   MST Score: 0   Diagnosis:  Acute CHF, Afib, CAD, HLD, HTN, ESBL UTI, ELIZABETH, hyponatremia, transaminitis , COPD, anemia     Relevant Medical History: CHF, Valvular insufficiency, VT arrest, ICD, CAD, PVD, HTN, HLD, COPD, afib     Scheduled Medications:  atorvastatin, 80 mg, Daily  ceFEPime IV (PEDS and ADULTS), 1 g, Q6H  cetirizine, 10 mg, Daily  clopidogreL, 75 mg, Daily  folic acid, 1 mg, Daily  furosemide (LASIX) injection, 40 mg, After lunch  [START ON 2/28/2025] furosemide (LASIX) injection, 60 mg, QAM  metoprolol succinate, 12.5 mg, Daily  pantoprazole, 40 mg, BID  polyethylene glycol, 17 g, Daily  potassium chloride SA, 20 mEq, BID  rivaroxaban, 20 mg, Daily  sacubitriL-valsartan, 2 tablet, BID  vitamin D, 1,000 Units, Daily    Continuous Infusions:   PRN Medications:   Current Facility-Administered Medications:     acetaminophen, 650 mg, Oral, Q8H PRN    albuterol, 2 puff, Inhalation, Q6H PRN    albuterol-ipratropium, 3 mL, Nebulization, Q6H PRN    famotidine, 20 mg, Oral, BID PRN    labetalol, 20 mg, Intravenous, Q6H PRN    melatonin, 6 mg, Oral, Nightly PRN    nicotine, 1 patch, Transdermal, Daily PRN    ondansetron, 4 mg, Intravenous, Q8H PRN    ondansetron, 8 mg, Intravenous, Q8H PRN    sodium chloride 0.9%, 10 mL, Intravenous, PRN    Recent Labs   Lab 02/26/25  1417 02/26/25  1439 02/27/25  0317   *  --  134*   K 4.6  --  4.0   CALCIUM 9.0  --  8.9   MG 2.30  --    --    CO2 16*  --  20*   BUN 33.7*  --  33.5*   CREATININE 1.33*  --  1.34*   EGFRNORACEVR 59  --  58   GLUCOSE 115  --  120*   BILITOT 3.4*  --   --    ALKPHOS 159*  --   --    ALT 90*  --   --    *  --   --    ALBUMIN 4.3  --   --    LIPASE  --  16  --    WBC 5.51  --  5.10   HGB 8.8*  --  9.0*   HCT 28.3*  --  29.2*     Nutrition Orders:  Diet Heart Healthy Fluid - 1200mL      Appetite/Oral Intake: good/% of meals  Factors Affecting Nutritional Intake: abdominal pain and nausea  Social Needs Impacting Access to Food: none identified  Food/Islam/Cultural Preferences: none reported  Food Allergies: none reported  Last Bowel Movement: 25  Wound(s):  none    Comments    () Pt reported he has a good appetite; eating 75% or greater; had abdominal pain/nausea upon admit; symptoms resolved. Pt state wt fluctuates greatly depending on edema/fluid retention; pt on diuretic. Abnormal labs noted; LFTs elevated--transaminitis; Bun/Cr (H)--ELIZABETH. Pt education completed on heart healthy diet     Anthropometrics    Height: 6' (182.9 cm), Height Method: Stated  Last Weight: 100.1 kg (220 lb 10.9 oz) (25), Weight Method: Standard Scale  BMI (Calculated): 29.9  BMI Classification: overweight (BMI 25-29.9)        Ideal Body Weight (IBW), Male: 178 lb     % Ideal Body Weight, Male (lb): 123.98 %                 Usual Body Weight (UBW), k kg (pt reported wt fluctuates greately with fluid --206-233#)  % Usual Body Weight: 100.31     Usual Weight Provided By: patient and EMR weight history    Wt Readings from Last 5 Encounters:   25 100.1 kg (220 lb 10.9 oz)   02/15/25 103.6 kg (228 lb 6.3 oz)   25 103.6 kg (228 lb 6.4 oz)   24 103.4 kg (228 lb)   24 105.6 kg (232 lb 12.9 oz)     Weight Change(s) Since Admission: wt fluctuates 206-233#  Wt Readings from Last 1 Encounters:   25 1819 100.1 kg (220 lb 10.9 oz)   25 1333 100.7 kg (222 lb)   Admit Weight: 100.7 kg  (222 lb) (02/26/25 1333), Weight Method: Standard Scale    Patient Education     Education Provided: heart healthy diet  Teaching Method: explanation and printed materials  Comprehension: verbalizes understanding  Barriers to Learning: none evident  Expected Compliance: fair  Comments: All questions were answered and dietitian's contact information was provided.     Nutrition Goals & Monitoring     Dietitian will monitor: food and beverage intake, weight, and food/nutrition knowledge skill  Discharge planning: continue heart healthy diet  Nutrition Risk/Follow-Up: low (follow-up in 5-7 days)  Patients assigned 'low nutrition risk' status do not qualify for a full nutritional assessment but will be monitored and re-evaluated in a 5-7 day time period. Please consult if re-evaluation needed sooner.

## 2025-02-27 NOTE — PLAN OF CARE
Problem: Adult Inpatient Plan of Care  Goal: Plan of Care Review  Outcome: Progressing     Problem: Adult Inpatient Plan of Care  Goal: Patient-Specific Goal (Individualized)  Outcome: Progressing     Problem: Adult Inpatient Plan of Care  Goal: Absence of Hospital-Acquired Illness or Injury  Outcome: Progressing     Problem: Adult Inpatient Plan of Care  Goal: Optimal Comfort and Wellbeing  Outcome: Progressing     Problem: Wound  Goal: Optimal Functional Ability  Outcome: Progressing

## 2025-02-27 NOTE — PROGRESS NOTES
Pharmacist Renal Dose Adjustment Note    Ran Reyes Jr. is a 69 y.o. male being treated with the medication cefepime     Patient Data:    Vital Signs (Most Recent):  Temp: 97.7 °F (36.5 °C) (02/27/25 0817)  Pulse: 74 (02/27/25 0817)  Resp: 18 (02/27/25 0817)  BP: 90/61 (02/27/25 0817)  SpO2: 95 % (02/27/25 0817) Vital Signs (72h Range):  Temp:  [97 °F (36.1 °C)-98.1 °F (36.7 °C)]   Pulse:  [69-95]   Resp:  [16-22]   BP: ()/(61-84)   SpO2:  [94 %-100 %]      Recent Labs   Lab 02/26/25  1417 02/27/25  0317   CREATININE 1.33* 1.34*     Serum creatinine: 1.34 mg/dL (H) 02/27/25 0317  Estimated creatinine clearance: 65.5 mL/min (A)    Medication: cefepime dose: 1 g frequency q12h will be changed to medication: cefepime dose: 1 g frequency: q6h    Pharmacist's Name: Valarie Juarez PharmD  Pharmacist's Extension: [phone]

## 2025-02-27 NOTE — PLAN OF CARE
02/27/25 0913   Discharge Assessment   Assessment Type Discharge Planning Assessment   Confirmed/corrected address, phone number and insurance Yes   Confirmed Demographics Correct on Facesheet   Source of Information patient;health record   When was your last doctors appointment?   (Abiodun Recinos)   Reason For Admission CHF, Abd bloating, R upper quadrant abd pain   People in Home alone   Facility Arrived From: Home   Do you expect to return to your current living situation? Yes   Do you have help at home or someone to help you manage your care at home? Yes   Who are your caregiver(s) and their phone number(s)? Yesenia Reyes (Sister)  [phone]; Sister, Nina Olsen ([phone])   Prior to hospitilization cognitive status: Alert/Oriented   Current cognitive status: Alert/Oriented   Walking or Climbing Stairs Difficulty yes   Walking or Climbing Stairs ambulation difficulty, requires equipment   Mobility Management Rollator   Dressing/Bathing Difficulty no   Home Accessibility wheelchair accessible   Home Layout Able to live on 1st floor   Equipment Currently Used at Home rollator;oxygen   Readmission within 30 days? No   Patient currently being followed by outpatient case management? No   Do you currently have service(s) that help you manage your care at home? Yes   Name and Contact number of agency [facility]   Is the pt/caregiver preference to resume services with current agency Yes   Do you take prescription medications? Yes  ([facility])   Do you have prescription coverage? Yes   Coverage [employer] M/C; [place] [employer]; M/D   Do you have any problems affording any of your prescribed medications? No   Is the patient taking medications as prescribed? yes   Who is going to help you get home at discharge? Family   How do you get to doctors appointments? family or friend will provide   Are you on dialysis? No   Discharge Plan B Home   DME Needed Upon Discharge  none   Discharge Plan discussed with:  Patient   Transition of Care Barriers None     Pt single with no children; Resides alone; Sisters, Yesenia & Nina,  provide assistance as needed; Pt receives SS & SNAP benefits; Current with [facility] - Will request new order; CM to follow.

## 2025-02-27 NOTE — PROGRESS NOTES
[facility] Progress Note    Subjective:      Ran Reyes Jr. is a 69 y.o. male with a history of HFrEF (30% 12/2024) with valvular insufficiency, with a history of VT arrest with planned ICD placement, CAD, PVD, HTN, HLD, and COPD who presented to [facility] ED on 2/26/2025  with complaint of abdominal pain, nausea, general malaise.  Patient had a scheduled cardiology follow-up today that he missed due to transportation issues. 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 Patient states that he noted nausea that started 4 days ago with symptoms worsening notably around 24 hours ago.  Patient also states that he had a urinalysis 11 days ago that was positive for UTI and that he did not start antibiotics. The patient arrived in the ED via EMS.  Patient denies chest pain, shortness of breath, fevers.  Patient notes some tingling in his lips and hands.  Patient reports that he has been adherent to his medications including his diuretics.  With nausea, patient's abdomen noted to be distended and painful.  Bilirubin has been elevated in the past and was 1.8 02/15/2025.  Today, 3.4.  Ultrasound abdomen reveals changes to the hepatic and portal veins suggestive of right heart failure.  No wall thickening, pericholecystic fluid, or calculi that were able to be appreciated were noted.  Due to CHF exacerbation, Hospital Medicine was consulted.     24hr interval   Patient reports marked improvement in symptoms.  He was diuresed 2300 mL yesterday.  We will increase Lasix today as goal diuresis as 3 L plus.  Currently continuing treatment for ESBL.  Sensitivity show infection sensitive to cefepime as well as Macrobid.  We will coordinate plan to discharge patient prior to his preop risk stratification appointment.  ICD placement tentatively for March 15.  He specifically reports decrease in his shortness of breath.  He is saturating 96% on room air.  He denies  fevers, headache, chest pain, , N/V/D and abdominal pain.     Objective:   Last 24 Hour Vital Signs:  BP  Min: 90/61  Max: 126/84  Temp  Av.5 °F (36.4 °C)  Min: 97 °F (36.1 °C)  Max: 98.1 °F (36.7 °C)  Pulse  Av  Min: 69  Max: 95  Resp  Av.5  Min: 16  Max: 22  SpO2  Av.8 %  Min: 94 %  Max: 100 %  Height  Av' (182.9 cm)  Min: 6' (182.9 cm)  Max: 6' (182.9 cm)  Weight  Av.4 kg (221 lb 5.4 oz)  Min: 100.1 kg (220 lb 10.9 oz)  Max: 100.7 kg (222 lb)  I/O last 3 completed shifts:  In: 480 [P.O.:480]  Out: 2700 [Urine:2700]    Physical Examination:  Physical Examination:  Vitals:   Vitals:    25 1220   BP:    Pulse: 75   Resp: 18   Temp:        Physical Exam  Constitutional:       Appearance: He is ill-appearing.   HENT:      Head: Normocephalic.      Mouth/Throat:      Mouth: Mucous membranes are moist.      Pharynx: Oropharynx is clear.   Neck:      Comments: Notable for JVD, could not appreciate carotid bruits  Cardiovascular:      Rate and Rhythm: Normal rate. Rhythm irregular.      Heart sounds: No murmur heard.  Pulmonary:      Effort: Pulmonary effort is normal.      Comments: Crackles noted on auscultation.  Abdominal:      General: Bowel sounds are normal. There is distension.   Musculoskeletal:         General: No swelling.      Cervical back: Neck supple.      Right lower leg: Edema present.      Left lower leg: Edema present.      Comments: 3+ pitting edema noted.   Skin:     General: Skin is warm.      Coloration: Skin is not jaundiced.      Findings: No erythema or rash.   Neurological:      General: No focal deficit present.      Mental Status: He is alert.      Deep Tendon Reflexes: Reflexes normal.   Psychiatric:         Mood and Affect: Mood normal.     Laboratory:    Recent Labs   Lab 25  1417 25  0317   WBC 5.51 5.10   HGB 8.8* 9.0*   HCT 28.3* 29.2*    180   MCV 82.0 83.2   RDW 19.9* 20.0*   * 134*   K 4.6 4.0    101   CO2 16* 20*  "  BUN 33.7* 33.5*   CREATININE 1.33* 1.34*   ALBUMIN 4.3  --    BILITOT 3.4*  --    *  --    ALKPHOS 159*  --    ALT 90*  --        Coags:   Lab Results   Component Value Date    INR 2.7 (H) 06/11/2024     FLP:   Lab Results   Component Value Date    CHOL 96 08/14/2024    HDL 32 (L) 08/14/2024    TRIG 73 08/14/2024     DM:   Lab Results   Component Value Date    HGBA1C 5.0 03/18/2024    HGBA1C 4.7 11/15/2021    HGBA1C 5.7 10/07/2020    CREATININE 1.34 (H) 02/27/2025     Thyroid:   Lab Results   Component Value Date    TSH 1.097 03/18/2024     Anemia:   Lab Results   Component Value Date    IRON 67 12/16/2024    TIBC 416 12/16/2024    FERRITIN 37.54 12/16/2024    LUSOKBDF20 1,078 (H) 03/18/2024    FOLATE 10.0 03/18/2024     Cardiac:   Lab Results   Component Value Date    TROPONINI 0.023 02/27/2025    BNP 2,509.6 (H) 02/26/2025     Pulm:   No results found for: "PO2ART", "FIO2", "PEEP"   Urinalysis:   Lab Results   Component Value Date    LABURIN >/= 100,000 colonies/ml Escherichia coli ESBL (A) 02/15/2025    COLORU Yellow 02/26/2025    SPECGRAV 1.015 05/15/2024    NITRITE Negative 02/26/2025    KETONESU neg 05/15/2024    UROBILINOGEN Normal 02/26/2025    WBCUA 21-50 (A) 02/26/2025       Trended Cardiac Data:  Recent Labs   Lab 02/26/25  1439 02/27/25  0448   TROPONINI  --  0.023   BNP 2,509.6*  --          Other Results:      Radiology:  Imaging Results              US Abdomen Limited (Final result)  Result time 02/26/25 16:19:49      Final result by Sailaja Atkinson MD (02/26/25 16:19:49)                   Impression:      Changes to the hepatic and portal veins suggestive of right heart failure      Electronically signed by: Sailaja Atkinson  Date:    02/26/2025  Time:    16:19               Narrative:    EXAMINATION:  US ABDOMEN LIMITED    CLINICAL HISTORY:  Transaminitis;    TECHNIQUE:  Limited ultrasound of the right upper quadrant of the abdomen was performed.    COMPARISON:  Abdomen radiograph " 02/26/2025    FINDINGS:  LIVER: 17 cm cranial caudal at the midclavicular line. Normal echotexture. No focal mass appreciable. Portal vein is patent with appropriate directional flow.  Increased pulsatility of the portal vein as can be seen with heart failure or cirrhosis.    PANCREAS: Obscured by overlying bowel gas.    GALLBLADDER: No shadowing calculi, wall thickening, or pericholecystic fluid.    BILE DUCTS: 4 mm common bile duct.  Distal common bile duct is obscured by shadowing bowel gas.    INFERIOR VENA CAVA: Normal in appearance.    RIGHT KIDNEY: 9.3 cm. No hydronephrosis.    OTHER: No ascites.  Distension of the IVC and hepatic veins.                                       X-Ray Abdomen AP 1 View (KUB) (Final result)  Result time 02/26/25 14:42:16      Final result by Lito Hurst MD (02/26/25 14:42:16)                   Impression:      Nonspecific gas pattern      Electronically signed by: Lito Hurst  Date:    02/26/2025  Time:    14:42               Narrative:    EXAMINATION:  XR ABDOMEN AP 1 VIEW    CLINICAL HISTORY:  Unspecified abdominal pain    TECHNIQUE:  AP X-RAY OF THE ABDOMEN:    CPT 49458    FINDINGS:  Examination reveals some residual feces throughout the colon the gas pattern is nonspecific with no clear evidence of ileus or obstruction no abnormal masses or calcifications identified                                      Current Medications:     Infusions:       Scheduled:   atorvastatin  80 mg Oral Daily    ceFEPime IV (PEDS and ADULTS)  1 g Intravenous Q6H    cetirizine  10 mg Oral Daily    clopidogreL  75 mg Oral Daily    folic acid  1 mg Oral Daily    furosemide (LASIX) injection  40 mg Intravenous After lunch    [START ON 2/28/2025] furosemide (LASIX) injection  60 mg Intravenous QAM    metoprolol succinate  12.5 mg Oral Daily    pantoprazole  40 mg Oral BID    polyethylene glycol  17 g Oral Daily    potassium chloride SA  20 mEq Oral BID    rivaroxaban  20 mg Oral Daily     sacubitriL-valsartan  2 tablet Oral BID    vitamin D  1,000 Units Oral Daily        PRN:    Current Facility-Administered Medications:     acetaminophen, 650 mg, Oral, Q8H PRN    albuterol, 2 puff, Inhalation, Q6H PRN    albuterol-ipratropium, 3 mL, Nebulization, Q6H PRN    famotidine, 20 mg, Oral, BID PRN    labetalol, 20 mg, Intravenous, Q6H PRN    melatonin, 6 mg, Oral, Nightly PRN    nicotine, 1 patch, Transdermal, Daily PRN    ondansetron, 4 mg, Intravenous, Q8H PRN    ondansetron, 8 mg, Intravenous, Q8H PRN    sodium chloride 0.9%, 10 mL, Intravenous, PRN      Assessment:     Ran Reyes JrLogan is a 69 y.o.male with  Patient Active Problem List    Diagnosis Date Noted    Abdominal bloating 02/27/2025    Abdominal pain 02/27/2025    Tobacco dependency 02/26/2025    CHF (congestive heart failure) 12/05/2024    Acute cystitis with hematuria 12/05/2024    History of COPD 08/15/2024    Acute heart failure 08/13/2024    Chronic obstructive pulmonary disease, unspecified 05/13/2024    Lesion of external ear 05/13/2024    Low back pain 05/13/2024    Other thrombophilia 05/13/2024    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    Positive colorectal cancer screening using Cologuard test 05/13/2024    Scrotal hematoma 03/18/2024    Postoperative eye state 04/11/2023    HFrEF (heart failure with reduced ejection fraction) 10/06/2022    Nonrheumatic mitral valve regurgitation 10/06/2022    COVID-19 07/24/2022    Primary open angle glaucoma (POAG) of right eye, moderate stage 06/02/2022    Combined forms of age-related cataract of left eye 06/02/2022    Arteriosclerosis of coronary artery 06/02/2022    Chronic atrial fibrillation 06/02/2022    Dyslipidemia 06/02/2022    Hypertension 06/02/2022    Tobacco use 06/02/2022    PVD (peripheral vascular disease) 06/02/2022    VHD (valvular heart disease) 06/02/2022        Plan:       Acute Exacerbation of HFrEF (EF 30%)  A fib, no Xarelto and rate controlled   Hx of CAD s/p  stenting and HLD, HTN  - Most recent TTE on 12/05/2024 demonstrates: LVef 30%, needing ICD placement  - EKG : afib without st depressions or elevation. Slightly poor r wave progression, LA shift   - BNP 2509.6 and CXR pending - Stage D: Refractory HF requiring specialized interventions.  - Fluid restriction at 1200 cc with strict I/Os and daily weights   -  Admit weight 220lbs.  Dry weight was noted at 220  previously. Will assess baseline   - IV diuresis with Lasix 80 mg tonight.  40 mg b.i.d. starting tomorrow.  Goal diuresis 2-3L/day.  Home diuretic dose:  Lasix 40 mg oral b.i.d.  - Maintain on telemetry  - most recent TSH, lipids, A1c at goal.  - Check Electrolytes, keep Mag >2 & K+ >4, at goal  - patient continued on Xarelto 20 b.i.d..  - Ambulate as tolerated    - Optimal therapy at this time to include :                          - Anti-platelet agent with clopidogrel 70 mg                          - ACE/ARB with Entresto 04/20/2026                           - BB metoprolol 12.5 mg b.i.d.                          - Statin with atorvastatin 80 mg                            - continue Lasix 60 mg a.m. and 40 mg at lunch.                                      - spironolactone held due to pressures at this time                          - Follow up transitional care visit for heart failure at discharge                          - cardiology consult in place.   -  SGLT2 was not initiated due to history of Claudine's gangrene  - Fluid restriction to 1.5 - 2.0 L/day if NYHA stage D, class VI or if hyponatremia symptomatic or < 120 mEq/L     ESBL UTI  -patient positive for ESBL on 02/15/2025.    -patient has not yet been treated with the appropriate antibiotics.    -blood cultures x2, urinalysis reflex to culture pending.    -continue cefepime 1 g q.8 hours at this time.       ELIZABETH (stable)  Hyponatremia (improved)  Elevated bilirubin   Transaminitis  -ELIZABETH prerenal from CHF.  - Hyponatremia noted, likely dilutional 2/2  fluid status   -transaminitis was noted without phos suggestive of hepatic biliary congestion.    -we will do sound abdomen revealed changes of the hepatic and portal veins suggestive of right heart failure.    -acidosis likely secondary to volume overload.     PAD s/p atherectomy and stenting 2022  -12/06/2020 44 BLE arterial ultrasound consistent with bilateral moderate stenosis with areas of monophasic flow.  Suspicion of venous insufficiency in the past.    -we will continue Plavix 75 mg and Lipitor 80 mg.    -patient is continued on Xarelto 20 mg b.i.d..     Hx of Moderate Normocytic Anemia  Hx of iron deficiency  -patient's H&H 8.3 and 28.3 today, likely dilution component as well from patient's current fluid status.    -continue to follow as patient is diuresed.     History of COPD  -p.r.n. DuoNebs q.4 hours.   -p.r.n. albuterol 2 puffs q.6 hours p.r.n.   -saturating 95 97% on room air.    -p.r.n. oxygen N/C        CODE STATUS: Full Code  Access: Peripheral  Antibiotics:  Meropenem 1 g q.8  Diet: Cardiac fluid restriction 1200 mL  DVT Prophylaxis:  Continued on Xarelto at this time.  GI Prophylaxis:  Ondansetron 4 mg 1st, 8 mg 2nd, famotidine  Fluids:  Restricted and as above    Marcus Juarez M.D  [facility] PGY-2  02/27/2025

## 2025-02-27 NOTE — PROGRESS NOTES
Order to resume  services upon discharge submitted to [facility] via [facility] fax.

## 2025-02-28 ENCOUNTER — RESULTS FOLLOW-UP (OUTPATIENT)
Dept: INTERNAL MEDICINE | Facility: CLINIC | Age: 70
End: 2025-02-28

## 2025-02-28 LAB
ALBUMIN SERPL-MCNC: 3.5 G/DL (ref 3.4–4.8)
ALBUMIN/GLOB SERPL: 1.1 RATIO (ref 1.1–2)
ALP SERPL-CCNC: 137 UNIT/L (ref 40–150)
ALT SERPL-CCNC: 74 UNIT/L (ref 0–55)
ANION GAP SERPL CALC-SCNC: 8 MEQ/L
ANION GAP SERPL CALC-SCNC: 8 MEQ/L
AST SERPL-CCNC: 66 UNIT/L (ref 5–34)
BASOPHILS # BLD AUTO: 0.02 X10(3)/MCL
BASOPHILS NFR BLD AUTO: 0.4 %
BILIRUB SERPL-MCNC: 1.8 MG/DL
BUN SERPL-MCNC: 36.7 MG/DL (ref 8.4–25.7)
BUN SERPL-MCNC: 40.3 MG/DL (ref 8.4–25.7)
CALCIUM SERPL-MCNC: 8.3 MG/DL (ref 8.8–10)
CALCIUM SERPL-MCNC: 8.4 MG/DL (ref 8.8–10)
CHLORIDE SERPL-SCNC: 105 MMOL/L (ref 98–107)
CHLORIDE SERPL-SCNC: 107 MMOL/L (ref 98–107)
CO2 SERPL-SCNC: 21 MMOL/L (ref 23–31)
CO2 SERPL-SCNC: 22 MMOL/L (ref 23–31)
CREAT SERPL-MCNC: 1.63 MG/DL (ref 0.72–1.25)
CREAT SERPL-MCNC: 1.91 MG/DL (ref 0.72–1.25)
CREAT/UREA NIT SERPL: 21
CREAT/UREA NIT SERPL: 23
EOSINOPHIL # BLD AUTO: 0.08 X10(3)/MCL (ref 0–0.9)
EOSINOPHIL NFR BLD AUTO: 1.6 %
ERYTHROCYTE [DISTWIDTH] IN BLOOD BY AUTOMATED COUNT: 20.6 % (ref 11.5–17)
GFR SERPLBLD CREATININE-BSD FMLA CKD-EPI: 38 ML/MIN/1.73/M2
GFR SERPLBLD CREATININE-BSD FMLA CKD-EPI: 46 ML/MIN/1.73/M2
GLOBULIN SER-MCNC: 3.1 GM/DL (ref 2.4–3.5)
GLUCOSE SERPL-MCNC: 129 MG/DL (ref 82–115)
GLUCOSE SERPL-MCNC: 143 MG/DL (ref 82–115)
HCT VFR BLD AUTO: 27.9 % (ref 42–52)
HGB BLD-MCNC: 8.5 G/DL (ref 14–18)
HOLD SPECIMEN: NORMAL
IMM GRANULOCYTES # BLD AUTO: 0.01 X10(3)/MCL (ref 0–0.04)
IMM GRANULOCYTES NFR BLD AUTO: 0.2 %
LYMPHOCYTES # BLD AUTO: 0.88 X10(3)/MCL (ref 0.6–4.6)
LYMPHOCYTES NFR BLD AUTO: 17.2 %
MCH RBC QN AUTO: 25.8 PG (ref 27–31)
MCHC RBC AUTO-ENTMCNC: 30.5 G/DL (ref 33–36)
MCV RBC AUTO: 84.5 FL (ref 80–94)
MONOCYTES # BLD AUTO: 0.75 X10(3)/MCL (ref 0.1–1.3)
MONOCYTES NFR BLD AUTO: 14.7 %
NEUTROPHILS # BLD AUTO: 3.37 X10(3)/MCL (ref 2.1–9.2)
NEUTROPHILS NFR BLD AUTO: 65.9 %
NRBC BLD AUTO-RTO: 0 %
PLATELET # BLD AUTO: 178 X10(3)/MCL (ref 130–400)
PMV BLD AUTO: 11.3 FL (ref 7.4–10.4)
POCT GLUCOSE: 106 MG/DL (ref 70–110)
POCT GLUCOSE: 82 MG/DL (ref 70–110)
POTASSIUM SERPL-SCNC: 4 MMOL/L (ref 3.5–5.1)
POTASSIUM SERPL-SCNC: 4.1 MMOL/L (ref 3.5–5.1)
PROT SERPL-MCNC: 6.6 GM/DL (ref 5.8–7.6)
RBC # BLD AUTO: 3.3 X10(6)/MCL (ref 4.7–6.1)
SODIUM SERPL-SCNC: 135 MMOL/L (ref 136–145)
SODIUM SERPL-SCNC: 136 MMOL/L (ref 136–145)
WBC # BLD AUTO: 5.11 X10(3)/MCL (ref 4.5–11.5)

## 2025-02-28 PROCEDURE — 25000003 PHARM REV CODE 250: Performed by: INTERNAL MEDICINE

## 2025-02-28 PROCEDURE — 94761 N-INVAS EAR/PLS OXIMETRY MLT: CPT

## 2025-02-28 PROCEDURE — 25000003 PHARM REV CODE 250

## 2025-02-28 PROCEDURE — 36415 COLL VENOUS BLD VENIPUNCTURE: CPT | Performed by: INTERNAL MEDICINE

## 2025-02-28 PROCEDURE — 36415 COLL VENOUS BLD VENIPUNCTURE: CPT

## 2025-02-28 PROCEDURE — 21400001 HC TELEMETRY ROOM

## 2025-02-28 PROCEDURE — 94640 AIRWAY INHALATION TREATMENT: CPT

## 2025-02-28 PROCEDURE — 80053 COMPREHEN METABOLIC PANEL: CPT

## 2025-02-28 PROCEDURE — 27000207 HC ISOLATION

## 2025-02-28 PROCEDURE — 63600175 PHARM REV CODE 636 W HCPCS: Performed by: INTERNAL MEDICINE

## 2025-02-28 PROCEDURE — 25000242 PHARM REV CODE 250 ALT 637 W/ HCPCS

## 2025-02-28 PROCEDURE — 85025 COMPLETE CBC W/AUTO DIFF WBC: CPT

## 2025-02-28 PROCEDURE — 27000221 HC OXYGEN, UP TO 24 HOURS

## 2025-02-28 PROCEDURE — 63600175 PHARM REV CODE 636 W HCPCS

## 2025-02-28 RX ORDER — GUAIFENESIN 100 MG/5ML
100 SOLUTION ORAL EVERY 6 HOURS PRN
Status: DISCONTINUED | OUTPATIENT
Start: 2025-02-28 | End: 2025-03-04 | Stop reason: HOSPADM

## 2025-02-28 RX ORDER — METHOCARBAMOL 500 MG/1
500 TABLET, FILM COATED ORAL ONCE
Status: COMPLETED | OUTPATIENT
Start: 2025-02-28 | End: 2025-02-28

## 2025-02-28 RX ORDER — MIDODRINE HYDROCHLORIDE 5 MG/1
10 TABLET ORAL 3 TIMES DAILY
Status: DISCONTINUED | OUTPATIENT
Start: 2025-02-28 | End: 2025-03-01

## 2025-02-28 RX ORDER — FUROSEMIDE 10 MG/ML
40 INJECTION INTRAMUSCULAR; INTRAVENOUS DAILY
Status: DISCONTINUED | OUTPATIENT
Start: 2025-02-28 | End: 2025-02-28

## 2025-02-28 RX ORDER — CEFEPIME HYDROCHLORIDE 1 G/1
1 INJECTION, POWDER, FOR SOLUTION INTRAMUSCULAR; INTRAVENOUS
Status: DISCONTINUED | OUTPATIENT
Start: 2025-02-28 | End: 2025-03-04 | Stop reason: HOSPADM

## 2025-02-28 RX ORDER — SODIUM CHLORIDE 9 MG/ML
INJECTION, SOLUTION INTRAVENOUS CONTINUOUS
Status: DISCONTINUED | OUTPATIENT
Start: 2025-02-28 | End: 2025-02-28

## 2025-02-28 RX ORDER — FUROSEMIDE 10 MG/ML
40 INJECTION INTRAMUSCULAR; INTRAVENOUS
Status: DISCONTINUED | OUTPATIENT
Start: 2025-02-28 | End: 2025-02-28

## 2025-02-28 RX ADMIN — MIDODRINE HYDROCHLORIDE 10 MG: 5 TABLET ORAL at 08:02

## 2025-02-28 RX ADMIN — CEFEPIME 1 G: 1 INJECTION, POWDER, FOR SOLUTION INTRAMUSCULAR; INTRAVENOUS at 05:02

## 2025-02-28 RX ADMIN — CLOPIDOGREL BISULFATE 75 MG: 75 TABLET ORAL at 09:02

## 2025-02-28 RX ADMIN — IPRATROPIUM BROMIDE AND ALBUTEROL SULFATE 3 ML: .5; 3 SOLUTION RESPIRATORY (INHALATION) at 08:02

## 2025-02-28 RX ADMIN — MIDODRINE HYDROCHLORIDE 10 MG: 5 TABLET ORAL at 04:02

## 2025-02-28 RX ADMIN — SODIUM CHLORIDE: 9 INJECTION, SOLUTION INTRAVENOUS at 01:02

## 2025-02-28 RX ADMIN — CEFEPIME 1 G: 1 INJECTION, POWDER, FOR SOLUTION INTRAMUSCULAR; INTRAVENOUS at 11:02

## 2025-02-28 RX ADMIN — POTASSIUM CHLORIDE 20 MEQ: 1500 TABLET, EXTENDED RELEASE ORAL at 08:02

## 2025-02-28 RX ADMIN — ATORVASTATIN CALCIUM 80 MG: 40 TABLET, FILM COATED ORAL at 09:02

## 2025-02-28 RX ADMIN — CETIRIZINE HYDROCHLORIDE 10 MG: 10 TABLET, FILM COATED ORAL at 09:02

## 2025-02-28 RX ADMIN — Medication 6 MG: at 08:02

## 2025-02-28 RX ADMIN — POLYETHYLENE GLYCOL 3350 17 G: 17 POWDER, FOR SOLUTION ORAL at 08:02

## 2025-02-28 RX ADMIN — PANTOPRAZOLE SODIUM 40 MG: 40 TABLET, DELAYED RELEASE ORAL at 08:02

## 2025-02-28 RX ADMIN — RIVAROXABAN 20 MG: 10 TABLET, FILM COATED ORAL at 04:02

## 2025-02-28 RX ADMIN — IPRATROPIUM BROMIDE AND ALBUTEROL SULFATE 3 ML: .5; 3 SOLUTION RESPIRATORY (INHALATION) at 12:02

## 2025-02-28 RX ADMIN — FOLIC ACID 1 MG: 1 TABLET ORAL at 09:02

## 2025-02-28 RX ADMIN — CEFEPIME 1 G: 1 INJECTION, POWDER, FOR SOLUTION INTRAMUSCULAR; INTRAVENOUS at 08:02

## 2025-02-28 RX ADMIN — GUAIFENESIN 100 MG: 100 SOLUTION ORAL at 05:02

## 2025-02-28 RX ADMIN — METHOCARBAMOL 500 MG: 500 TABLET ORAL at 01:02

## 2025-02-28 RX ADMIN — GUAIFENESIN 100 MG: 100 SOLUTION ORAL at 08:02

## 2025-02-28 RX ADMIN — CEFEPIME 1 G: 1 INJECTION, POWDER, FOR SOLUTION INTRAMUSCULAR; INTRAVENOUS at 12:02

## 2025-02-28 RX ADMIN — MIDODRINE HYDROCHLORIDE 10 MG: 5 TABLET ORAL at 09:02

## 2025-02-28 RX ADMIN — POTASSIUM CHLORIDE 20 MEQ: 1500 TABLET, EXTENDED RELEASE ORAL at 09:02

## 2025-02-28 RX ADMIN — SODIUM CHLORIDE 500 ML: 9 INJECTION, SOLUTION INTRAVENOUS at 09:02

## 2025-02-28 RX ADMIN — IPRATROPIUM BROMIDE AND ALBUTEROL SULFATE 3 ML: .5; 3 SOLUTION RESPIRATORY (INHALATION) at 06:02

## 2025-02-28 RX ADMIN — CHOLECALCIFEROL TAB 25 MCG (1000 UNIT) 1000 UNITS: 25 TAB at 09:02

## 2025-02-28 NOTE — PLAN OF CARE
Problem: Adult Inpatient Plan of Care  Goal: Plan of Care Review  Outcome: Progressing     Problem: Adult Inpatient Plan of Care  Goal: Patient-Specific Goal (Individualized)  Outcome: Progressing     Problem: Adult Inpatient Plan of Care  Goal: Absence of Hospital-Acquired Illness or Injury  Outcome: Progressing     Problem: Adult Inpatient Plan of Care  Goal: Optimal Comfort and Wellbeing  Outcome: Progressing     Problem: Adult Inpatient Plan of Care  Goal: Readiness for Transition of Care  Outcome: Progressing     Problem: Wound  Goal: Optimal Coping  Outcome: Progressing     Problem: Wound  Goal: Optimal Functional Ability  Outcome: Progressing     Problem: Wound  Goal: Absence of Infection Signs and Symptoms  Outcome: Progressing

## 2025-02-28 NOTE — PROGRESS NOTES
[facility] Medicine Progress Note    Subjective:     aRn Reyes Jr. is a 69 y.o. male with a history of HFrEF (30% 12/2024) with valvular insufficiency, with a history of VT arrest with planned ICD placement, CAD, PVD, HTN, HLD, and COPD who presented to [facility] ED on 2/26/2025  with complaint of abdominal pain, nausea, general malaise.  Patient had a scheduled cardiology follow-up today that he missed due to transportation issues. 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 Patient states that he noted nausea that started 4 days ago with symptoms worsening notably around 24 hours ago.  Patient also states that he had a urinalysis 11 days ago that was positive for UTI and that he did not start antibiotics. The patient arrived in the ED via EMS.  Patient denies chest pain, shortness of breath, fevers.  Patient notes some tingling in his lips and hands.  Patient reports that he has been adherent to his medications including his diuretics.  With nausea, patient's abdomen noted to be distended and painful.  Bilirubin has been elevated in the past and was 1.8 02/15/2025.  Today, 3.4.  Ultrasound abdomen reveals changes to the hepatic and portal veins suggestive of right heart failure.  No wall thickening, pericholecystic fluid, or calculi that were able to be appreciated were noted.  Due to CHF exacerbation, Hospital Medicine was consulted.     24hr interval  Overnight, patient's pressures dropped and with lowest value being 70/57. Patient was reportedly tired/weak at 0300 when it occurred. Patient was bolused 500mL NS and given midodrine. BP MAPs improved somewhat but pressures still soft. Discontinued entresto and held lasix at this time. No other events overnight. Patient feels much improved this morning.Swelling and edema in legs are also much improved. He is saturating 98% on 2 L nasal canula.     Objective:   Last 24 Hour Vital Signs:  BP  Min: 70/57   Max: 156/120  Temp  Av.8 °F (36.6 °C)  Min: 97.5 °F (36.4 °C)  Max: 98.8 °F (37.1 °C)  Pulse  Av.7  Min: 54  Max: 89  Resp  Av.4  Min: 18  Max: 20  SpO2  Av.6 %  Min: 90 %  Max: 100 %  I/O last 3 completed shifts:  In: 960 [P.O.:960]  Out: 2950 [Urine:2950]    Physical Examination:  Physical Examination:  Vitals:   Vitals:    25 0643   BP:    Pulse: 74   Resp: 18   Temp:        Physical Exam  Constitutional:       Appearance: ill appearance improved    HENT:      Head: Normocephalic.      Mouth/Throat:      Mouth: Mucous membranes are moist.      Pharynx: Oropharynx is clear.   Neck:      Comments: JVD improved   Cardiovascular:      Rate and Rhythm: Normal rate. Rhythm irregular.      Heart sounds: No murmur heard.  Pulmonary:      Effort: Pulmonary effort is normal.      Comments: Crackles still notable    Abdominal:      General: Bowel sounds are normal. There is distension, improved .   Musculoskeletal:         General: No swelling.      Cervical back: Neck supple.      Right lower leg: Edema present.      Left lower leg: Edema present.      Comments: +1, improved from +3   Skin:     General: Skin is warm.      Coloration: Skin is not jaundiced.      Findings: No erythema or rash.   Neurological:      General: No focal deficit present.      Mental Status: He is alert.      Deep Tendon Reflexes: Reflexes normal.   Psychiatric:         Mood and Affect: Mood normal.     Laboratory:    Recent Labs   Lab 25  1417 25  0317 25  0314   WBC 5.51 5.10 5.11   HGB 8.8* 9.0* 8.5*   HCT 28.3* 29.2* 27.9*    180 178   MCV 82.0 83.2 84.5   RDW 19.9* 20.0* 20.6*   * 134* 135*   K 4.6 4.0 4.1    101 105   CO2 16* 20* 22*   BUN 33.7* 33.5* 40.3*   CREATININE 1.33* 1.34* 1.91*   ALBUMIN 4.3  --  3.5   BILITOT 3.4*  --  1.8*   *  --  66*   ALKPHOS 159*  --  137   ALT 90*  --  74*       Coags:   Lab Results   Component Value Date    INR 2.7 (H) 2024     FLP:  "  Lab Results   Component Value Date    CHOL 96 08/14/2024    HDL 32 (L) 08/14/2024    TRIG 73 08/14/2024     DM:   Lab Results   Component Value Date    HGBA1C 5.0 03/18/2024    HGBA1C 4.7 11/15/2021    HGBA1C 5.7 10/07/2020    CREATININE 1.91 (H) 02/28/2025     Thyroid:   Lab Results   Component Value Date    TSH 1.097 03/18/2024     Anemia:   Lab Results   Component Value Date    IRON 67 12/16/2024    TIBC 416 12/16/2024    FERRITIN 37.54 12/16/2024    YTFEVJFK24 1,078 (H) 03/18/2024    FOLATE 10.0 03/18/2024     Cardiac:   Lab Results   Component Value Date    TROPONINI 0.023 02/27/2025    BNP 2,509.6 (H) 02/26/2025     Pulm:   No results found for: "PO2ART", "FIO2", "PEEP"   Urinalysis:   Lab Results   Component Value Date    LABURIN >/= 100,000 colonies/ml Escherichia coli ESBL (A) 02/15/2025    COLORU Yellow 02/26/2025    SPECGRAV 1.015 05/15/2024    NITRITE Negative 02/26/2025    KETONESU neg 05/15/2024    UROBILINOGEN Normal 02/26/2025    WBCUA 21-50 (A) 02/26/2025       Trended Cardiac Data:  Recent Labs   Lab 02/26/25  1439 02/27/25  0448   TROPONINI  --  0.023   BNP 2,509.6*  --          Other Results:      Radiology:  Imaging Results              US Abdomen Limited (Final result)  Result time 02/26/25 16:19:49      Final result by Sailaja Atkinson MD (02/26/25 16:19:49)                   Impression:      Changes to the hepatic and portal veins suggestive of right heart failure      Electronically signed by: Sailaja Atkinson  Date:    02/26/2025  Time:    16:19               Narrative:    EXAMINATION:  US ABDOMEN LIMITED    CLINICAL HISTORY:  Transaminitis;    TECHNIQUE:  Limited ultrasound of the right upper quadrant of the abdomen was performed.    COMPARISON:  Abdomen radiograph 02/26/2025    FINDINGS:  LIVER: 17 cm cranial caudal at the midclavicular line. Normal echotexture. No focal mass appreciable. Portal vein is patent with appropriate directional flow.  Increased pulsatility of the portal " vein as can be seen with heart failure or cirrhosis.    PANCREAS: Obscured by overlying bowel gas.    GALLBLADDER: No shadowing calculi, wall thickening, or pericholecystic fluid.    BILE DUCTS: 4 mm common bile duct.  Distal common bile duct is obscured by shadowing bowel gas.    INFERIOR VENA CAVA: Normal in appearance.    RIGHT KIDNEY: 9.3 cm. No hydronephrosis.    OTHER: No ascites.  Distension of the IVC and hepatic veins.                                       X-Ray Abdomen AP 1 View (KUB) (Final result)  Result time 02/26/25 14:42:16      Final result by Lito Hurst MD (02/26/25 14:42:16)                   Impression:      Nonspecific gas pattern      Electronically signed by: Lito Hurst  Date:    02/26/2025  Time:    14:42               Narrative:    EXAMINATION:  XR ABDOMEN AP 1 VIEW    CLINICAL HISTORY:  Unspecified abdominal pain    TECHNIQUE:  AP X-RAY OF THE ABDOMEN:    CPT 69588    FINDINGS:  Examination reveals some residual feces throughout the colon the gas pattern is nonspecific with no clear evidence of ileus or obstruction no abnormal masses or calcifications identified                                      Current Medications:     Infusions:   0.9% NaCl   Intravenous Continuous 500 mL/hr at 02/28/25 0114 New Bag at 02/28/25 0114        Scheduled:   atorvastatin  80 mg Oral Daily    ceFEPime IV (PEDS and ADULTS)  1 g Intravenous Q6H    cetirizine  10 mg Oral Daily    clopidogreL  75 mg Oral Daily    folic acid  1 mg Oral Daily    furosemide (LASIX) injection  40 mg Intravenous Daily    furosemide (LASIX) injection  40 mg Intravenous After lunch    metoprolol succinate  12.5 mg Oral Daily    midodrine  10 mg Oral TID    pantoprazole  40 mg Oral BID    polyethylene glycol  17 g Oral Daily    potassium chloride SA  20 mEq Oral BID    rivaroxaban  20 mg Oral Daily    vitamin D  1,000 Units Oral Daily        PRN:    Current Facility-Administered Medications:     acetaminophen, 650 mg,  Oral, Q8H PRN    albuterol, 2 puff, Inhalation, Q6H PRN    albuterol-ipratropium, 3 mL, Nebulization, Q6H PRN    famotidine, 20 mg, Oral, BID PRN    guaiFENesin 100 mg/5 ml, 100 mg, Oral, Q6H PRN    labetalol, 20 mg, Intravenous, Q6H PRN    melatonin, 6 mg, Oral, Nightly PRN    nicotine, 1 patch, Transdermal, Daily PRN    ondansetron, 4 mg, Intravenous, Q8H PRN    ondansetron, 8 mg, Intravenous, Q8H PRN    sodium chloride 0.9%, 10 mL, Intravenous, PRN      Assessment:     Ran Reyes Jr. is a 69 y.o.male with  Patient Active Problem List    Diagnosis Date Noted    Abdominal bloating 02/27/2025    Abdominal pain 02/27/2025    Tobacco dependency 02/26/2025    CHF (congestive heart failure) 12/05/2024    Acute cystitis with hematuria 12/05/2024    History of COPD 08/15/2024    Acute heart failure 08/13/2024    Chronic obstructive pulmonary disease, unspecified 05/13/2024    Lesion of external ear 05/13/2024    Low back pain 05/13/2024    Other thrombophilia 05/13/2024    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    Positive colorectal cancer screening using Cologuard test 05/13/2024    Scrotal hematoma 03/18/2024    Postoperative eye state 04/11/2023    HFrEF (heart failure with reduced ejection fraction) 10/06/2022    Nonrheumatic mitral valve regurgitation 10/06/2022    COVID-19 07/24/2022    Primary open angle glaucoma (POAG) of right eye, moderate stage 06/02/2022    Combined forms of age-related cataract of left eye 06/02/2022    Arteriosclerosis of coronary artery 06/02/2022    Chronic atrial fibrillation 06/02/2022    Dyslipidemia 06/02/2022    Hypertension 06/02/2022    Tobacco use 06/02/2022    PVD (peripheral vascular disease) 06/02/2022    VHD (valvular heart disease) 06/02/2022        Plan:       Acute Exacerbation of HFrEF (EF 30%)  A fib, no Xarelto and rate controlled   Hx of CAD s/p stenting and HLD, HTN  - Most recent TTE on 12/05/2024 demonstrates: LVef 30%, needing ICD placement  - EKG : afib  without st depressions or elevation. Slightly poor r wave progression, LA shift   - BNP 2509.6 and CXR pending - Stage D: Refractory HF requiring specialized interventions.  - Fluid restriction at 1200 cc with strict I/Os and daily weights, bolus PRN if pressures are overcorrected   -  Admit weight 220lbs.  Dry weight was noted at 220  previously. Will assess baseline   - Patient's pressures dropped into the 70s/50s at points over night.   - Creatinine 1.3 -> 1.9, likely perfusion related. Will continue to monitor   - he was bolused 500 mL NS and given midodrine  - Will DC entresto, decrease morning dose of Lasix to 40 mg IV, and delay both morning and afternoon doses 2+ hours today.   - Home diuretic dose:  Lasix 40 mg oral b.i.d.  - Maintain on telemetry  - most recent TSH, lipids, A1c at goal.  - Check Electrolytes, keep Mag >2 & K+ >4, at goal  - patient continued on Xarelto 20 b.i.d..  - Ambulate as tolerated    - Optimal therapy at this time to include :                          - Anti-platelet agent with clopidogrel 70 mg                          - ACE/ARB with Entresto 04/20/2026 - discontinued for now due to pressures                           - BB metoprolol 12.5 mg b.i.d.                          - Statin with atorvastatin 80 mg                            - will hold lasix at this time.                           - Follow up transitional care visit for heart failure at discharge  -  SGLT2 was not initiated due to history of Claudine's gangrene  - Fluid restriction to 1.5 - 2.0 L/day if NYHA stage D, class VI or if hyponatremia symptomatic or < 120 mEq/L     ELIZABETH   - Creatinine 1.34->1.91  - Likely 2/2 hypoperfusion during hypotension   - D/C entresto and lasix as above   -avoid nephrotoxic agents if possible   -will recheck BMP today     ESBL UTI  -patient positive for ESBL on 02/15/2025.    -patient has not yet been treated with the appropriate antibiotics.    -blood cultures x2, urinalysis reflex to  culture pending.    -continue cefepime 1 g q.8 hours at this time.  -urine culture bactrim sensitive. Will DC on bactrim for remaining days for 7-10 days total     Hyponatremia (improved)  Elevated bilirubin   Transaminitis  -ELIZABETH prerenal from CHF.  - Hyponatremia noted, likely dilutional 2/2 fluid status   -transaminitis was noted without phos suggestive of hepatic biliary congestion.    - improved on interval   -we will do sound abdomen revealed changes of the hepatic and portal veins suggestive of right heart failure.    -acidosis likely secondary to volume overload initially     PAD s/p atherectomy and stenting 2022  -12/06/2020 44 BLE arterial ultrasound consistent with bilateral moderate stenosis with areas of monophasic flow.  Suspicion of venous insufficiency in the past.    -we will continue Plavix 75 mg and Lipitor 80 mg.    -patient is continued on Xarelto 20 mg b.i.d..     Hx of Moderate Normocytic Anemia  Hx of iron deficiency  -patient's H&H 8.5 and 27.9today, likely dilution component as well from patient's current fluid status.    -continue to follow as patient is diuresed.     History of COPD  -p.r.n. DuoNebs q.4 hours.   -p.r.n. albuterol 2 puffs q.6 hours p.r.n.   -saturating 95 97% on room air.    -p.r.n. oxygen N/C        CODE STATUS: Full Code  Access: Peripheral  Antibiotics:  Meropenem 1 g q.8  Diet: Cardiac fluid restriction 1200 mL  DVT Prophylaxis:  Continued on Xarelto at this time.  GI Prophylaxis:  Ondansetron 4 mg 1st, 8 mg 2nd, famotidine  Fluids:  Restricted and as above    Disposition: day 3 since arrival for CHF exacerbation. Improved from a volume stand point but hypotensive today with Cr elevated. Will hold antihypertensives and lasix at this time and monitor for more robust pressures.     Marino Marquez, DO  [employer], Internal Medicine, PGY-I    02/28/2025

## 2025-03-01 LAB
ALBUMIN SERPL-MCNC: 3.4 G/DL (ref 3.4–4.8)
ALBUMIN/GLOB SERPL: 1.1 RATIO (ref 1.1–2)
ALP SERPL-CCNC: 135 UNIT/L (ref 40–150)
ALT SERPL-CCNC: 61 UNIT/L (ref 0–55)
ANION GAP SERPL CALC-SCNC: 7 MEQ/L
AST SERPL-CCNC: 46 UNIT/L (ref 5–34)
BACTERIA UR CULT: ABNORMAL
BASOPHILS # BLD AUTO: 0.02 X10(3)/MCL
BASOPHILS NFR BLD AUTO: 0.4 %
BILIRUB SERPL-MCNC: 1.3 MG/DL
BUN SERPL-MCNC: 34.7 MG/DL (ref 8.4–25.7)
CALCIUM SERPL-MCNC: 8.4 MG/DL (ref 8.8–10)
CHLORIDE SERPL-SCNC: 107 MMOL/L (ref 98–107)
CO2 SERPL-SCNC: 22 MMOL/L (ref 23–31)
CREAT SERPL-MCNC: 1.35 MG/DL (ref 0.72–1.25)
CREAT/UREA NIT SERPL: 26
EOSINOPHIL # BLD AUTO: 0.09 X10(3)/MCL (ref 0–0.9)
EOSINOPHIL NFR BLD AUTO: 1.9 %
ERYTHROCYTE [DISTWIDTH] IN BLOOD BY AUTOMATED COUNT: 21.2 % (ref 11.5–17)
GFR SERPLBLD CREATININE-BSD FMLA CKD-EPI: 58 ML/MIN/1.73/M2
GLOBULIN SER-MCNC: 3.2 GM/DL (ref 2.4–3.5)
GLUCOSE SERPL-MCNC: 127 MG/DL (ref 82–115)
HCT VFR BLD AUTO: 29.5 % (ref 42–52)
HGB BLD-MCNC: 9 G/DL (ref 14–18)
IMM GRANULOCYTES # BLD AUTO: 0.01 X10(3)/MCL (ref 0–0.04)
IMM GRANULOCYTES NFR BLD AUTO: 0.2 %
LYMPHOCYTES # BLD AUTO: 0.92 X10(3)/MCL (ref 0.6–4.6)
LYMPHOCYTES NFR BLD AUTO: 19.5 %
MCH RBC QN AUTO: 25.9 PG (ref 27–31)
MCHC RBC AUTO-ENTMCNC: 30.5 G/DL (ref 33–36)
MCV RBC AUTO: 84.8 FL (ref 80–94)
MONOCYTES # BLD AUTO: 0.71 X10(3)/MCL (ref 0.1–1.3)
MONOCYTES NFR BLD AUTO: 15 %
NEUTROPHILS # BLD AUTO: 2.98 X10(3)/MCL (ref 2.1–9.2)
NEUTROPHILS NFR BLD AUTO: 63 %
NRBC BLD AUTO-RTO: 0.4 %
PLATELET # BLD AUTO: 200 X10(3)/MCL (ref 130–400)
PMV BLD AUTO: 10.8 FL (ref 7.4–10.4)
POTASSIUM SERPL-SCNC: 4.9 MMOL/L (ref 3.5–5.1)
PROT SERPL-MCNC: 6.6 GM/DL (ref 5.8–7.6)
RBC # BLD AUTO: 3.48 X10(6)/MCL (ref 4.7–6.1)
SODIUM SERPL-SCNC: 136 MMOL/L (ref 136–145)
WBC # BLD AUTO: 4.73 X10(3)/MCL (ref 4.5–11.5)

## 2025-03-01 PROCEDURE — 85025 COMPLETE CBC W/AUTO DIFF WBC: CPT

## 2025-03-01 PROCEDURE — 80053 COMPREHEN METABOLIC PANEL: CPT

## 2025-03-01 PROCEDURE — 93005 ELECTROCARDIOGRAM TRACING: CPT

## 2025-03-01 PROCEDURE — 21400001 HC TELEMETRY ROOM

## 2025-03-01 PROCEDURE — 25000003 PHARM REV CODE 250

## 2025-03-01 PROCEDURE — 27000207 HC ISOLATION

## 2025-03-01 PROCEDURE — 27000221 HC OXYGEN, UP TO 24 HOURS

## 2025-03-01 PROCEDURE — 25000003 PHARM REV CODE 250: Performed by: INTERNAL MEDICINE

## 2025-03-01 PROCEDURE — 36415 COLL VENOUS BLD VENIPUNCTURE: CPT

## 2025-03-01 PROCEDURE — 25000242 PHARM REV CODE 250 ALT 637 W/ HCPCS

## 2025-03-01 PROCEDURE — 94640 AIRWAY INHALATION TREATMENT: CPT

## 2025-03-01 PROCEDURE — 63600175 PHARM REV CODE 636 W HCPCS: Performed by: INTERNAL MEDICINE

## 2025-03-01 PROCEDURE — 94761 N-INVAS EAR/PLS OXIMETRY MLT: CPT

## 2025-03-01 RX ORDER — MIDODRINE HYDROCHLORIDE 5 MG/1
5 TABLET ORAL 2 TIMES DAILY WITH MEALS
Status: DISCONTINUED | OUTPATIENT
Start: 2025-03-01 | End: 2025-03-01

## 2025-03-01 RX ORDER — FUROSEMIDE 20 MG/1
20 TABLET ORAL DAILY
Status: DISCONTINUED | OUTPATIENT
Start: 2025-03-01 | End: 2025-03-02

## 2025-03-01 RX ORDER — MIDODRINE HYDROCHLORIDE 5 MG/1
10 TABLET ORAL
Status: DISCONTINUED | OUTPATIENT
Start: 2025-03-01 | End: 2025-03-03

## 2025-03-01 RX ORDER — SODIUM CHLORIDE 9 MG/ML
INJECTION, SOLUTION INTRAVENOUS ONCE
Status: DISCONTINUED | OUTPATIENT
Start: 2025-03-01 | End: 2025-03-01

## 2025-03-01 RX ORDER — SODIUM CHLORIDE 9 MG/ML
INJECTION, SOLUTION INTRAVENOUS CONTINUOUS
Status: DISCONTINUED | OUTPATIENT
Start: 2025-03-01 | End: 2025-03-01

## 2025-03-01 RX ORDER — FUROSEMIDE 20 MG/1
20 TABLET ORAL DAILY
Status: DISCONTINUED | OUTPATIENT
Start: 2025-03-01 | End: 2025-03-01

## 2025-03-01 RX ORDER — METOPROLOL SUCCINATE 25 MG/1
25 TABLET, EXTENDED RELEASE ORAL DAILY
Status: DISCONTINUED | OUTPATIENT
Start: 2025-03-01 | End: 2025-03-01

## 2025-03-01 RX ADMIN — CETIRIZINE HYDROCHLORIDE 10 MG: 10 TABLET, FILM COATED ORAL at 08:03

## 2025-03-01 RX ADMIN — CEFEPIME 1 G: 1 INJECTION, POWDER, FOR SOLUTION INTRAMUSCULAR; INTRAVENOUS at 02:03

## 2025-03-01 RX ADMIN — ATORVASTATIN CALCIUM 80 MG: 40 TABLET, FILM COATED ORAL at 08:03

## 2025-03-01 RX ADMIN — IPRATROPIUM BROMIDE AND ALBUTEROL SULFATE 3 ML: .5; 3 SOLUTION RESPIRATORY (INHALATION) at 12:03

## 2025-03-01 RX ADMIN — CHOLECALCIFEROL TAB 25 MCG (1000 UNIT) 1000 UNITS: 25 TAB at 08:03

## 2025-03-01 RX ADMIN — PANTOPRAZOLE SODIUM 40 MG: 40 TABLET, DELAYED RELEASE ORAL at 08:03

## 2025-03-01 RX ADMIN — SODIUM CHLORIDE: 9 INJECTION, SOLUTION INTRAVENOUS at 05:03

## 2025-03-01 RX ADMIN — MIDODRINE HYDROCHLORIDE 10 MG: 5 TABLET ORAL at 08:03

## 2025-03-01 RX ADMIN — MIDODRINE HYDROCHLORIDE 10 MG: 5 TABLET ORAL at 04:03

## 2025-03-01 RX ADMIN — CLOPIDOGREL BISULFATE 75 MG: 75 TABLET ORAL at 08:03

## 2025-03-01 RX ADMIN — CEFEPIME 1 G: 1 INJECTION, POWDER, FOR SOLUTION INTRAMUSCULAR; INTRAVENOUS at 11:03

## 2025-03-01 RX ADMIN — MIDODRINE HYDROCHLORIDE 10 MG: 5 TABLET ORAL at 11:03

## 2025-03-01 RX ADMIN — METOPROLOL SUCCINATE 25 MG: 25 TABLET, EXTENDED RELEASE ORAL at 09:03

## 2025-03-01 RX ADMIN — FUROSEMIDE 20 MG: 20 TABLET ORAL at 11:03

## 2025-03-01 RX ADMIN — CEFEPIME 1 G: 1 INJECTION, POWDER, FOR SOLUTION INTRAMUSCULAR; INTRAVENOUS at 07:03

## 2025-03-01 RX ADMIN — IPRATROPIUM BROMIDE AND ALBUTEROL SULFATE 3 ML: .5; 3 SOLUTION RESPIRATORY (INHALATION) at 07:03

## 2025-03-01 RX ADMIN — RIVAROXABAN 20 MG: 10 TABLET, FILM COATED ORAL at 04:03

## 2025-03-01 RX ADMIN — FOLIC ACID 1 MG: 1 TABLET ORAL at 08:03

## 2025-03-01 NOTE — PROGRESS NOTES
[facility] Progress Note    Subjective:    Note incomplete, some content may be outdated   Ran Reyes Jr. is a 69 y.o. male with a history of HFrEF (30% 12/2024) with valvular insufficiency, with a history of VT arrest with planned ICD placement, CAD, PVD, HTN, HLD, and COPD who presented to [facility] ED on 2/26/2025  with complaint of abdominal pain, nausea, general malaise.  Patient had a scheduled cardiology follow-up today that he missed due to transportation issues. 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 Patient states that he noted nausea that started 4 days ago with symptoms worsening notably around 24 hours ago.  Patient also states that he had a urinalysis 11 days ago that was positive for UTI and that he did not start antibiotics. The patient arrived in the ED via EMS.  Patient denies chest pain, shortness of breath, fevers.  Patient notes some tingling in his lips and hands.  Patient reports that he has been adherent to his medications including his diuretics.  With nausea, patient's abdomen noted to be distended and painful.  Bilirubin has been elevated in the past and was 1.8 02/15/2025.  Today, 3.4.  Ultrasound abdomen reveals changes to the hepatic and portal veins suggestive of right heart failure.  No wall thickening, pericholecystic fluid, or calculi that were able to be appreciated were noted.  Due to CHF exacerbation, Hospital Medicine was consulted.     24hr interval  Patient's blood pressure with GDM T and diuresis has been very difficult to control.  Patient is not tolerating GDM T at this time.  Has a history of V-tach and recently had a 8 beat run of V-tach, but patient was asymptomatic and it subsided on its own.  We will place metoprolol back on.  Given his low pressures he was previously given fluid boluses and his BP is responsive.  This has resulted in patient regaining his lower extremity edema that previously  subsided with diuresis.  If patient's pressures drop again we will place in ICU.  Currently on midodrine.  No acute complaints.    Objective:   Last 24 Hour Vital Signs:  BP  Min: 70/49  Max: 115/78  Temp  Av.9 °F (37.2 °C)  Min: 98.2 °F (36.8 °C)  Max: 99.8 °F (37.7 °C)  Pulse  Av.4  Min: 51  Max: 91  Resp  Av.1  Min: 16  Max: 18  SpO2  Av.8 %  Min: 93 %  Max: 100 %  Weight  Av.3 kg (221 lb 1.9 oz)  Min: 100.3 kg (221 lb 1.9 oz)  Max: 100.3 kg (221 lb 1.9 oz)  I/O last 3 completed shifts:  In: 2635 [P.O.:1785; I.V.:354.1; IV Piggyback:495.9]  Out: 2100 [Urine:2100]    Physical Examination:  Physical Examination:  Vitals:   Vitals:    25 0753   BP: (!) 83/55   Pulse: (!) 51   Resp: 16   Temp: 99.8 °F (37.7 °C)       Physical Exam  Constitutional:       Appearance: ill appearance improved    HENT:      Head: Normocephalic.      Mouth/Throat:      Mouth: Mucous membranes are moist.      Pharynx: Oropharynx is clear.   Neck:      Comments: JVD improved   Cardiovascular:      Rate and Rhythm: Normal rate. Rhythm irregular.      Heart sounds: No murmur heard.  Pulmonary:      Effort: Pulmonary effort is normal.      Comments: Crackles still notable    Abdominal:      General: Bowel sounds are normal. There is distension, improved .   Musculoskeletal:         General: No swelling.      Cervical back: Neck supple.      Right lower leg: Edema present.      Left lower leg: Edema present.      Comments: +1, improved from +3   Skin:     General: Skin is warm.      Coloration: Skin is not jaundiced.      Findings: No erythema or rash.   Neurological:      General: No focal deficit present.      Mental Status: He is alert.      Deep Tendon Reflexes: Reflexes normal.   Psychiatric:         Mood and Affect: Mood normal.     Laboratory:    Recent Labs   Lab 25  1417 25  0317 25  0314 25  1242 25  0415 25  0416   WBC 5.51 5.10 5.11  --   --  4.73   HGB 8.8* 9.0*  "8.5*  --   --  9.0*   HCT 28.3* 29.2* 27.9*  --   --  29.5*    180 178  --   --  200   MCV 82.0 83.2 84.5  --   --  84.8   RDW 19.9* 20.0* 20.6*  --   --  21.2*   * 134* 135* 136 136  --    K 4.6 4.0 4.1 4.0 4.9  --     101 105 107 107  --    CO2 16* 20* 22* 21* 22*  --    BUN 33.7* 33.5* 40.3* 36.7* 34.7*  --    CREATININE 1.33* 1.34* 1.91* 1.63* 1.35*  --    ALBUMIN 4.3  --  3.5  --  3.4  --    BILITOT 3.4*  --  1.8*  --  1.3  --    *  --  66*  --  46*  --    ALKPHOS 159*  --  137  --  135  --    ALT 90*  --  74*  --  61*  --        Coags:   Lab Results   Component Value Date    INR 2.7 (H) 06/11/2024     FLP:   Lab Results   Component Value Date    CHOL 96 08/14/2024    HDL 32 (L) 08/14/2024    TRIG 73 08/14/2024     DM:   Lab Results   Component Value Date    HGBA1C 5.0 03/18/2024    HGBA1C 4.7 11/15/2021    HGBA1C 5.7 10/07/2020    CREATININE 1.35 (H) 03/01/2025     Thyroid:   Lab Results   Component Value Date    TSH 1.097 03/18/2024     Anemia:   Lab Results   Component Value Date    IRON 67 12/16/2024    TIBC 416 12/16/2024    FERRITIN 37.54 12/16/2024    JHMEULGH40 1,078 (H) 03/18/2024    FOLATE 10.0 03/18/2024     Cardiac:   Lab Results   Component Value Date    TROPONINI 0.023 02/27/2025    BNP 2,509.6 (H) 02/26/2025     Pulm:   No results found for: "PO2ART", "FIO2", "PEEP"   Urinalysis:   Lab Results   Component Value Date    LABURIN >/= 100,000 colonies/ml Escherichia coli ESBL (A) 02/26/2025    COLORU Yellow 02/26/2025    SPECGRAV 1.015 05/15/2024    NITRITE Negative 02/26/2025    KETONESU neg 05/15/2024    UROBILINOGEN Normal 02/26/2025    WBCUA 21-50 (A) 02/26/2025       Trended Cardiac Data:  Recent Labs   Lab 02/26/25  1439 02/27/25  0448   TROPONINI  --  0.023   BNP 2,509.6*  --          Other Results:      Radiology:  Imaging Results              US Abdomen Limited (Final result)  Result time 02/26/25 16:19:49      Final result by Sailaja Atkinson MD (02/26/25 " 16:19:49)                   Impression:      Changes to the hepatic and portal veins suggestive of right heart failure      Electronically signed by: Sailaja Atkinson  Date:    02/26/2025  Time:    16:19               Narrative:    EXAMINATION:  US ABDOMEN LIMITED    CLINICAL HISTORY:  Transaminitis;    TECHNIQUE:  Limited ultrasound of the right upper quadrant of the abdomen was performed.    COMPARISON:  Abdomen radiograph 02/26/2025    FINDINGS:  LIVER: 17 cm cranial caudal at the midclavicular line. Normal echotexture. No focal mass appreciable. Portal vein is patent with appropriate directional flow.  Increased pulsatility of the portal vein as can be seen with heart failure or cirrhosis.    PANCREAS: Obscured by overlying bowel gas.    GALLBLADDER: No shadowing calculi, wall thickening, or pericholecystic fluid.    BILE DUCTS: 4 mm common bile duct.  Distal common bile duct is obscured by shadowing bowel gas.    INFERIOR VENA CAVA: Normal in appearance.    RIGHT KIDNEY: 9.3 cm. No hydronephrosis.    OTHER: No ascites.  Distension of the IVC and hepatic veins.                                       X-Ray Abdomen AP 1 View (KUB) (Final result)  Result time 02/26/25 14:42:16      Final result by Lito Hurst MD (02/26/25 14:42:16)                   Impression:      Nonspecific gas pattern      Electronically signed by: Lito Hurst  Date:    02/26/2025  Time:    14:42               Narrative:    EXAMINATION:  XR ABDOMEN AP 1 VIEW    CLINICAL HISTORY:  Unspecified abdominal pain    TECHNIQUE:  AP X-RAY OF THE ABDOMEN:    CPT 12420    FINDINGS:  Examination reveals some residual feces throughout the colon the gas pattern is nonspecific with no clear evidence of ileus or obstruction no abnormal masses or calcifications identified                                      Current Medications:     Infusions:         Scheduled:   atorvastatin  80 mg Oral Daily    ceFEPime IV (PEDS and ADULTS)  1 g Intravenous Q8H     cetirizine  10 mg Oral Daily    clopidogreL  75 mg Oral Daily    folic acid  1 mg Oral Daily    furosemide  20 mg Oral Daily    metoprolol succinate  25 mg Oral Daily    midodrine  10 mg Oral TID WM    pantoprazole  40 mg Oral BID    rivaroxaban  20 mg Oral Daily    vitamin D  1,000 Units Oral Daily        PRN:    Current Facility-Administered Medications:     acetaminophen, 650 mg, Oral, Q8H PRN    albuterol, 2 puff, Inhalation, Q6H PRN    albuterol-ipratropium, 3 mL, Nebulization, Q6H PRN    famotidine, 20 mg, Oral, BID PRN    guaiFENesin 100 mg/5 ml, 100 mg, Oral, Q6H PRN    labetalol, 20 mg, Intravenous, Q6H PRN    melatonin, 6 mg, Oral, Nightly PRN    nicotine, 1 patch, Transdermal, Daily PRN    ondansetron, 4 mg, Intravenous, Q8H PRN    ondansetron, 8 mg, Intravenous, Q8H PRN    sodium chloride 0.9%, 10 mL, Intravenous, PRN      Assessment:     Ran Reyes  is a 69 y.o.male with  Patient Active Problem List    Diagnosis Date Noted    Abdominal bloating 02/27/2025    Abdominal pain 02/27/2025    Tobacco dependency 02/26/2025    CHF (congestive heart failure) 12/05/2024    Acute cystitis with hematuria 12/05/2024    History of COPD 08/15/2024    Acute heart failure 08/13/2024    Chronic obstructive pulmonary disease, unspecified 05/13/2024    Lesion of external ear 05/13/2024    Low back pain 05/13/2024    Other thrombophilia 05/13/2024    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    Positive colorectal cancer screening using Cologuard test 05/13/2024    Scrotal hematoma 03/18/2024    Postoperative eye state 04/11/2023    HFrEF (heart failure with reduced ejection fraction) 10/06/2022    Nonrheumatic mitral valve regurgitation 10/06/2022    COVID-19 07/24/2022    Primary open angle glaucoma (POAG) of right eye, moderate stage 06/02/2022    Combined forms of age-related cataract of left eye 06/02/2022    Arteriosclerosis of coronary artery 06/02/2022    Chronic atrial fibrillation 06/02/2022     Dyslipidemia 06/02/2022    Hypertension 06/02/2022    Tobacco use 06/02/2022    PVD (peripheral vascular disease) 06/02/2022    VHD (valvular heart disease) 06/02/2022        Plan:       Acute Exacerbation of HFrEF (EF 30%)  A fib, no Xarelto and rate controlled   Hx of CAD s/p stenting and HLD, HTN  - Most recent TTE on 12/05/2024 demonstrates: LVef 30%, needing ICD placement  - EKG : afib without st depressions or elevation. Slightly poor r wave progression, LA shift   - BNP 2509.6 and CXR pending - Stage D: Refractory HF requiring specialized interventions.  - Fluid restriction at 1200 cc with strict I/Os and daily weights, bolus PRN if pressures are overcorrected   -  Admit weight 220lbs.  Dry weight was noted at 220  previously. Will assess baseline   - Patient's pressures dropped into the 70s/50s at points over night.   - Creatinine 1.3 -> 1.9, likely perfusion related. Will continue to monitor   - he was bolused 500 mL NS and given midodrine  - Will DC entresto, decrease morning dose of Lasix to 40 mg IV, and delay both morning and afternoon doses 2+ hours today.   - Home diuretic dose:  Lasix 40 mg oral b.i.d.  - Maintain on telemetry  - most recent TSH, lipids, A1c at goal.  - Check Electrolytes, keep Mag >2 & K+ >4, at goal  - patient continued on Xarelto 20 b.i.d..  - Ambulate as tolerated    - Optimal therapy at this time to include :                          - Anti-platelet agent with clopidogrel 70 mg                          - ACE/ARB with Entresto 04/20/2026 - discontinued for now due to pressures                           - BB metoprolol succinate 25 mg q.d.                          - Statin with atorvastatin 80 mg                            - Lasix 20 p.o.                           - Follow up transitional care visit for heart failure at discharge  -  SGLT2 was not initiated due to history of Claudine's gangrene  - Fluid restriction to 1.5 - 2.0 L/day if NYHA stage D, class VI or if hyponatremia  symptomatic or < 120 mEq/L     ELIZABETH   - Creatinine improved  - Likely 2/2 hypoperfusion during hypotension as well as volume overload.  - D/C entresto    -avoid nephrotoxic agents if possible   -will recheck BMP today     ESBL UTI  -patient positive for ESBL on 02/15/2025.    -blood cultures x2 and MIRELA, urinalysis reflex to culture with ESBL sensitive to cefepime and Macrobid  -continue cefepime 1 g q.8 hours at this time.     Hyponatremia (improved)  Elevated bilirubin   Transaminitis  -ELIZABETH prerenal from CHF.  - Hyponatremia noted, likely dilutional 2/2 fluid status   -transaminitis was noted without phos suggestive of hepatic biliary congestion.    - improved on interval   -we will do sound abdomen revealed changes of the hepatic and portal veins suggestive of right heart failure.    -acidosis likely secondary to volume overload initially     PAD s/p atherectomy and stenting 2022  -12/06/2020 44 BLE arterial ultrasound consistent with bilateral moderate stenosis with areas of monophasic flow.  Suspicion of venous insufficiency in the past.    -we will continue Plavix 75 mg and Lipitor 80 mg.    -patient is continued on Xarelto 20 mg b.i.d..     Hx of Moderate Normocytic Anemia  Hx of iron deficiency  -patient's H&H 8.5 and 27.9today, likely dilution component as well from patient's current fluid status.    -continue to follow as patient is diuresed.     History of COPD  -p.r.n. DuoNebs q.4 hours.   -p.r.n. albuterol 2 puffs q.6 hours p.r.n.   -saturating 95 97% on room air.    -p.r.n. oxygen N/C        CODE STATUS: Full Code  Access: Peripheral  Antibiotics:  Cefepime 1 g q.8  Diet: Cardiac fluid restriction 1200 mL  DVT Prophylaxis:  Continued on Xarelto at this time.  GI Prophylaxis:  Ondansetron 4 mg 1st, 8 mg 2nd, famotidine  Fluids:  Restricted and as above    Marcus Juarez M.D  [facility] PGY-2      03/01/2025

## 2025-03-02 LAB
ALBUMIN SERPL-MCNC: 3.7 G/DL (ref 3.4–4.8)
ALBUMIN/GLOB SERPL: 1.1 RATIO (ref 1.1–2)
ALP SERPL-CCNC: 167 UNIT/L (ref 40–150)
ALT SERPL-CCNC: 55 UNIT/L (ref 0–55)
ANION GAP SERPL CALC-SCNC: 7 MEQ/L
ANION GAP SERPL CALC-SCNC: 8 MEQ/L
ANION GAP SERPL CALC-SCNC: 8 MEQ/L
AST SERPL-CCNC: 48 UNIT/L (ref 5–34)
BASOPHILS # BLD AUTO: 0.02 X10(3)/MCL
BASOPHILS NFR BLD AUTO: 0.4 %
BILIRUB SERPL-MCNC: 1.3 MG/DL
BUN SERPL-MCNC: 32.8 MG/DL (ref 8.4–25.7)
BUN SERPL-MCNC: 33 MG/DL (ref 8.4–25.7)
BUN SERPL-MCNC: 34.6 MG/DL (ref 8.4–25.7)
CALCIUM SERPL-MCNC: 9.4 MG/DL (ref 8.8–10)
CALCIUM SERPL-MCNC: 9.6 MG/DL (ref 8.8–10)
CALCIUM SERPL-MCNC: 9.6 MG/DL (ref 8.8–10)
CHLORIDE SERPL-SCNC: 106 MMOL/L (ref 98–107)
CHLORIDE SERPL-SCNC: 107 MMOL/L (ref 98–107)
CHLORIDE SERPL-SCNC: 108 MMOL/L (ref 98–107)
CO2 SERPL-SCNC: 20 MMOL/L (ref 23–31)
CO2 SERPL-SCNC: 20 MMOL/L (ref 23–31)
CO2 SERPL-SCNC: 22 MMOL/L (ref 23–31)
CREAT SERPL-MCNC: 1.02 MG/DL (ref 0.72–1.25)
CREAT SERPL-MCNC: 1.1 MG/DL (ref 0.72–1.25)
CREAT SERPL-MCNC: 1.17 MG/DL (ref 0.72–1.25)
CREAT/UREA NIT SERPL: 30
CREAT/UREA NIT SERPL: 30
CREAT/UREA NIT SERPL: 32
EOSINOPHIL # BLD AUTO: 0.12 X10(3)/MCL (ref 0–0.9)
EOSINOPHIL NFR BLD AUTO: 2.5 %
ERYTHROCYTE [DISTWIDTH] IN BLOOD BY AUTOMATED COUNT: 21.2 % (ref 11.5–17)
GFR SERPLBLD CREATININE-BSD FMLA CKD-EPI: >60 ML/MIN/1.73/M2
GLOBULIN SER-MCNC: 3.5 GM/DL (ref 2.4–3.5)
GLUCOSE SERPL-MCNC: 102 MG/DL (ref 82–115)
GLUCOSE SERPL-MCNC: 95 MG/DL (ref 82–115)
GLUCOSE SERPL-MCNC: 95 MG/DL (ref 82–115)
HCT VFR BLD AUTO: 30.2 % (ref 42–52)
HGB BLD-MCNC: 9.2 G/DL (ref 14–18)
HOLD SPECIMEN: NORMAL
IMM GRANULOCYTES # BLD AUTO: 0.01 X10(3)/MCL (ref 0–0.04)
IMM GRANULOCYTES NFR BLD AUTO: 0.2 %
LYMPHOCYTES # BLD AUTO: 1.17 X10(3)/MCL (ref 0.6–4.6)
LYMPHOCYTES NFR BLD AUTO: 24.1 %
MCH RBC QN AUTO: 25.5 PG (ref 27–31)
MCHC RBC AUTO-ENTMCNC: 30.5 G/DL (ref 33–36)
MCV RBC AUTO: 83.7 FL (ref 80–94)
MONOCYTES # BLD AUTO: 0.71 X10(3)/MCL (ref 0.1–1.3)
MONOCYTES NFR BLD AUTO: 14.6 %
NEUTROPHILS # BLD AUTO: 2.83 X10(3)/MCL (ref 2.1–9.2)
NEUTROPHILS NFR BLD AUTO: 58.2 %
NRBC BLD AUTO-RTO: 0.4 %
PLATELET # BLD AUTO: 205 X10(3)/MCL (ref 130–400)
PMV BLD AUTO: 10.2 FL (ref 7.4–10.4)
POTASSIUM SERPL-SCNC: 5 MMOL/L (ref 3.5–5.1)
POTASSIUM SERPL-SCNC: 5.4 MMOL/L (ref 3.5–5.1)
POTASSIUM SERPL-SCNC: 5.5 MMOL/L (ref 3.5–5.1)
PROT SERPL-MCNC: 7.2 GM/DL (ref 5.8–7.6)
RBC # BLD AUTO: 3.61 X10(6)/MCL (ref 4.7–6.1)
SODIUM SERPL-SCNC: 135 MMOL/L (ref 136–145)
SODIUM SERPL-SCNC: 135 MMOL/L (ref 136–145)
SODIUM SERPL-SCNC: 136 MMOL/L (ref 136–145)
WBC # BLD AUTO: 4.86 X10(3)/MCL (ref 4.5–11.5)

## 2025-03-02 PROCEDURE — 36415 COLL VENOUS BLD VENIPUNCTURE: CPT

## 2025-03-02 PROCEDURE — 36415 COLL VENOUS BLD VENIPUNCTURE: CPT | Performed by: INTERNAL MEDICINE

## 2025-03-02 PROCEDURE — 85025 COMPLETE CBC W/AUTO DIFF WBC: CPT

## 2025-03-02 PROCEDURE — 99900035 HC TECH TIME PER 15 MIN (STAT)

## 2025-03-02 PROCEDURE — 25000003 PHARM REV CODE 250

## 2025-03-02 PROCEDURE — 93005 ELECTROCARDIOGRAM TRACING: CPT

## 2025-03-02 PROCEDURE — 80053 COMPREHEN METABOLIC PANEL: CPT

## 2025-03-02 PROCEDURE — 94640 AIRWAY INHALATION TREATMENT: CPT

## 2025-03-02 PROCEDURE — 63600175 PHARM REV CODE 636 W HCPCS: Performed by: INTERNAL MEDICINE

## 2025-03-02 PROCEDURE — 27000207 HC ISOLATION

## 2025-03-02 PROCEDURE — 25000242 PHARM REV CODE 250 ALT 637 W/ HCPCS

## 2025-03-02 PROCEDURE — 21400001 HC TELEMETRY ROOM

## 2025-03-02 RX ORDER — CALCIUM GLUCONATE 20 MG/ML
1 INJECTION, SOLUTION INTRAVENOUS ONCE
Status: COMPLETED | OUTPATIENT
Start: 2025-03-02 | End: 2025-03-02

## 2025-03-02 RX ORDER — SIMETHICONE 80 MG
2 TABLET,CHEWABLE ORAL ONCE
Status: COMPLETED | OUTPATIENT
Start: 2025-03-02 | End: 2025-03-02

## 2025-03-02 RX ORDER — SIMETHICONE 80 MG
1 TABLET,CHEWABLE ORAL ONCE
Status: DISCONTINUED | OUTPATIENT
Start: 2025-03-02 | End: 2025-03-02

## 2025-03-02 RX ORDER — FUROSEMIDE 20 MG/1
40 TABLET ORAL DAILY
Status: DISCONTINUED | OUTPATIENT
Start: 2025-03-02 | End: 2025-03-03

## 2025-03-02 RX ADMIN — RIVAROXABAN 20 MG: 10 TABLET, FILM COATED ORAL at 04:03

## 2025-03-02 RX ADMIN — METOPROLOL SUCCINATE 12.5 MG: 25 TABLET, EXTENDED RELEASE ORAL at 09:03

## 2025-03-02 RX ADMIN — CLOPIDOGREL BISULFATE 75 MG: 75 TABLET ORAL at 09:03

## 2025-03-02 RX ADMIN — MIDODRINE HYDROCHLORIDE 10 MG: 5 TABLET ORAL at 04:03

## 2025-03-02 RX ADMIN — CETIRIZINE HYDROCHLORIDE 10 MG: 10 TABLET, FILM COATED ORAL at 09:03

## 2025-03-02 RX ADMIN — MIDODRINE HYDROCHLORIDE 10 MG: 5 TABLET ORAL at 09:03

## 2025-03-02 RX ADMIN — CHOLECALCIFEROL TAB 25 MCG (1000 UNIT) 1000 UNITS: 25 TAB at 09:03

## 2025-03-02 RX ADMIN — SODIUM ZIRCONIUM CYCLOSILICATE 10 G: 10 POWDER, FOR SUSPENSION ORAL at 09:03

## 2025-03-02 RX ADMIN — FOLIC ACID 1 MG: 1 TABLET ORAL at 09:03

## 2025-03-02 RX ADMIN — CEFEPIME 1 G: 1 INJECTION, POWDER, FOR SOLUTION INTRAMUSCULAR; INTRAVENOUS at 03:03

## 2025-03-02 RX ADMIN — ATORVASTATIN CALCIUM 80 MG: 40 TABLET, FILM COATED ORAL at 09:03

## 2025-03-02 RX ADMIN — MIDODRINE HYDROCHLORIDE 10 MG: 5 TABLET ORAL at 11:03

## 2025-03-02 RX ADMIN — FUROSEMIDE 40 MG: 20 TABLET ORAL at 09:03

## 2025-03-02 RX ADMIN — CALCIUM GLUCONATE 1 G: 20 INJECTION, SOLUTION INTRAVENOUS at 03:03

## 2025-03-02 RX ADMIN — IPRATROPIUM BROMIDE AND ALBUTEROL SULFATE 3 ML: .5; 3 SOLUTION RESPIRATORY (INHALATION) at 01:03

## 2025-03-02 RX ADMIN — SIMETHICONE 160 MG: 80 TABLET, CHEWABLE ORAL at 11:03

## 2025-03-02 RX ADMIN — CEFEPIME 1 G: 1 INJECTION, POWDER, FOR SOLUTION INTRAMUSCULAR; INTRAVENOUS at 11:03

## 2025-03-02 RX ADMIN — CEFEPIME 1 G: 1 INJECTION, POWDER, FOR SOLUTION INTRAMUSCULAR; INTRAVENOUS at 07:03

## 2025-03-02 RX ADMIN — IPRATROPIUM BROMIDE AND ALBUTEROL SULFATE 3 ML: .5; 3 SOLUTION RESPIRATORY (INHALATION) at 07:03

## 2025-03-02 NOTE — PROGRESS NOTES
[facility] Internal Medicine Progress Note    Subjective:   Ran Reyes Jr. is a 69 y.o. male with a history of HFrEF (30% 2024) with valvular insufficiency, with a history of VT arrest with planned ICD placement, CAD, PVD, HTN, HLD, and COPD who presented to [facility] ED on 2025  with complaint of abdominal pain, nausea, general malaise.  Patient had a scheduled cardiology follow-up today that he missed due to transportation issues. 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 Patient states that he noted nausea that started 4 days ago with symptoms worsening notably around 24 hours ago.  Patient also states that he had a urinalysis 11 days ago that was positive for UTI and that he did not start antibiotics. The patient arrived in the ED via EMS.  Patient denies chest pain, shortness of breath, fevers.  Patient notes some tingling in his lips and hands.  Patient reports that he has been adherent to his medications including his diuretics.  With nausea, patient's abdomen noted to be distended and painful.  Bilirubin has been elevated in the past and was 1.8 02/15/2025.  Today, 3.4.  Ultrasound abdomen reveals changes to the hepatic and portal veins suggestive of right heart failure.  No wall thickening, pericholecystic fluid, or calculi that were able to be appreciated were noted.  Due to CHF exacerbation, Hospital Medicine was consulted.     24hr interval  Overnight, patient had a report 8 beat run of ventricular tachycardia as well as reported bradycardia that briefly dipped as low as 20 beats.  Night team cut metoprolol from 25 to 12.5 BID and gave calcium gluconate. No other events. Potassium was 5.4 on morning labs. 5.5 on repeat BMP. Patient reports that his edema in his extremities is improved but he feels that his abdomen is distended and reports a dry cough.     Objective:   Last 24 Hour Vital Signs:  BP  Min: 82/41  Max: 105/65  Temp  Av.5 °F  (36.9 °C)  Min: 97.8 °F (36.6 °C)  Max: 99.8 °F (37.7 °C)  Pulse  Av.8  Min: 51  Max: 90  Resp  Av.8  Min: 16  Max: 20  SpO2  Av %  Min: 86 %  Max: 100 %  Weight  Av.7 kg (222 lb)  Min: 100.7 kg (222 lb)  Max: 100.7 kg (222 lb)  I/O last 3 completed shifts:  In: 3496.2 [P.O.:2500; I.V.:500.3; IV Piggyback:495.9]  Out: 2100 [Urine:2100]    Physical Examination:  Physical Examination:  Vitals:   Vitals:    25 0356   BP: 105/65   Pulse: 70   Resp: 18   Temp: 98.5 °F (36.9 °C)       Physical Exam  Constitutional:       Appearance: ill appearance improved    HENT:      Head: Normocephalic.      Mouth/Throat:      Mouth: Mucous membranes are moist.      Pharynx: Oropharynx is clear.   Neck:      Comments: JVD improved   Cardiovascular:      Rate and Rhythm: Normal rate. Rhythm irregular.      Heart sounds: No murmur heard.  Pulmonary:      Effort: Pulmonary effort is normal.      Comments: Crackles improved but present     Abdominal:      General: Bowel sounds are normal. There is distension.   Musculoskeletal:         General: No swelling.      Cervical back: Neck supple.      Right lower leg: Edema present.      Left lower leg: Edema present.      Comments: +1, improved from +3   Skin:     General: Skin is warm.      Coloration: Skin is not jaundiced.      Findings: No erythema or rash.   Neurological:      General: No focal deficit present.      Mental Status: He is alert.      Deep Tendon Reflexes: Reflexes normal.   Psychiatric:         Mood and Affect: Mood normal.     Laboratory:    Recent Labs   Lab 25  0314 25  1242 25  0415 25  0416 25  0401   WBC 5.11  --   --  4.73 4.86   HGB 8.5*  --   --  9.0* 9.2*   HCT 27.9*  --   --  29.5* 30.2*     --   --  200 205   MCV 84.5  --   --  84.8 83.7   RDW 20.6*  --   --  21.2* 21.2*   * 136 136  --  135*   K 4.1 4.0 4.9  --  5.4*    107 107  --  106   CO2 22* 21* 22*  --  22*   BUN 40.3* 36.7* 34.7*   "--  34.6*   CREATININE 1.91* 1.63* 1.35*  --  1.17   ALBUMIN 3.5  --  3.4  --  3.7   BILITOT 1.8*  --  1.3  --  1.3   AST 66*  --  46*  --  48*   ALKPHOS 137  --  135  --  167*   ALT 74*  --  61*  --  55       Coags:   Lab Results   Component Value Date    INR 2.7 (H) 06/11/2024     FLP:   Lab Results   Component Value Date    CHOL 96 08/14/2024    HDL 32 (L) 08/14/2024    TRIG 73 08/14/2024     DM:   Lab Results   Component Value Date    HGBA1C 5.0 03/18/2024    HGBA1C 4.7 11/15/2021    HGBA1C 5.7 10/07/2020    CREATININE 1.17 03/02/2025     Thyroid:   Lab Results   Component Value Date    TSH 1.097 03/18/2024     Anemia:   Lab Results   Component Value Date    IRON 67 12/16/2024    TIBC 416 12/16/2024    FERRITIN 37.54 12/16/2024    MUMPSGQE18 1,078 (H) 03/18/2024    FOLATE 10.0 03/18/2024     Cardiac:   Lab Results   Component Value Date    TROPONINI 0.023 02/27/2025    BNP 2,509.6 (H) 02/26/2025     Pulm:   No results found for: "PO2ART", "FIO2", "PEEP"   Urinalysis:   Lab Results   Component Value Date    LABURIN >/= 100,000 colonies/ml Escherichia coli ESBL (A) 02/26/2025    COLORU Yellow 02/26/2025    SPECGRAV 1.015 05/15/2024    NITRITE Negative 02/26/2025    KETONESU neg 05/15/2024    UROBILINOGEN Normal 02/26/2025    WBCUA 21-50 (A) 02/26/2025       Trended Cardiac Data:  Recent Labs   Lab 02/26/25  1439 02/27/25  0448   TROPONINI  --  0.023   BNP 2,509.6*  --          Other Results:      Radiology:  Imaging Results              US Abdomen Limited (Final result)  Result time 02/26/25 16:19:49      Final result by Sailaja Atkinson MD (02/26/25 16:19:49)                   Impression:      Changes to the hepatic and portal veins suggestive of right heart failure      Electronically signed by: Sailaja Atkinson  Date:    02/26/2025  Time:    16:19               Narrative:    EXAMINATION:  US ABDOMEN LIMITED    CLINICAL HISTORY:  Transaminitis;    TECHNIQUE:  Limited ultrasound of the right upper quadrant " of the abdomen was performed.    COMPARISON:  Abdomen radiograph 02/26/2025    FINDINGS:  LIVER: 17 cm cranial caudal at the midclavicular line. Normal echotexture. No focal mass appreciable. Portal vein is patent with appropriate directional flow.  Increased pulsatility of the portal vein as can be seen with heart failure or cirrhosis.    PANCREAS: Obscured by overlying bowel gas.    GALLBLADDER: No shadowing calculi, wall thickening, or pericholecystic fluid.    BILE DUCTS: 4 mm common bile duct.  Distal common bile duct is obscured by shadowing bowel gas.    INFERIOR VENA CAVA: Normal in appearance.    RIGHT KIDNEY: 9.3 cm. No hydronephrosis.    OTHER: No ascites.  Distension of the IVC and hepatic veins.                                       X-Ray Abdomen AP 1 View (KUB) (Final result)  Result time 02/26/25 14:42:16      Final result by Lito Hurst MD (02/26/25 14:42:16)                   Impression:      Nonspecific gas pattern      Electronically signed by: Lito Hurst  Date:    02/26/2025  Time:    14:42               Narrative:    EXAMINATION:  XR ABDOMEN AP 1 VIEW    CLINICAL HISTORY:  Unspecified abdominal pain    TECHNIQUE:  AP X-RAY OF THE ABDOMEN:    CPT 25907    FINDINGS:  Examination reveals some residual feces throughout the colon the gas pattern is nonspecific with no clear evidence of ileus or obstruction no abnormal masses or calcifications identified                                      Current Medications:     Infusions:         Scheduled:   atorvastatin  80 mg Oral Daily    ceFEPime IV (PEDS and ADULTS)  1 g Intravenous Q8H    cetirizine  10 mg Oral Daily    clopidogreL  75 mg Oral Daily    folic acid  1 mg Oral Daily    furosemide  20 mg Oral Daily    metoprolol succinate  12.5 mg Oral Daily    midodrine  10 mg Oral TID WM    pantoprazole  40 mg Oral BID    rivaroxaban  20 mg Oral Daily    vitamin D  1,000 Units Oral Daily        PRN:    Current Facility-Administered Medications:      acetaminophen, 650 mg, Oral, Q8H PRN    albuterol, 2 puff, Inhalation, Q6H PRN    albuterol-ipratropium, 3 mL, Nebulization, Q6H PRN    famotidine, 20 mg, Oral, BID PRN    guaiFENesin 100 mg/5 ml, 100 mg, Oral, Q6H PRN    labetalol, 20 mg, Intravenous, Q6H PRN    melatonin, 6 mg, Oral, Nightly PRN    nicotine, 1 patch, Transdermal, Daily PRN    ondansetron, 4 mg, Intravenous, Q8H PRN    ondansetron, 8 mg, Intravenous, Q8H PRN    sodium chloride 0.9%, 10 mL, Intravenous, PRN      Assessment:     Ran Reyes Jr. is a 69 y.o.male with  Patient Active Problem List    Diagnosis Date Noted    Abdominal bloating 02/27/2025    Abdominal pain 02/27/2025    Tobacco dependency 02/26/2025    CHF (congestive heart failure) 12/05/2024    Acute cystitis with hematuria 12/05/2024    History of COPD 08/15/2024    Acute heart failure 08/13/2024    Chronic obstructive pulmonary disease, unspecified 05/13/2024    Lesion of external ear 05/13/2024    Low back pain 05/13/2024    Other thrombophilia 05/13/2024    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    Positive colorectal cancer screening using Cologuard test 05/13/2024    Scrotal hematoma 03/18/2024    Postoperative eye state 04/11/2023    HFrEF (heart failure with reduced ejection fraction) 10/06/2022    Nonrheumatic mitral valve regurgitation 10/06/2022    COVID-19 07/24/2022    Primary open angle glaucoma (POAG) of right eye, moderate stage 06/02/2022    Combined forms of age-related cataract of left eye 06/02/2022    Arteriosclerosis of coronary artery 06/02/2022    Chronic atrial fibrillation 06/02/2022    Dyslipidemia 06/02/2022    Hypertension 06/02/2022    Tobacco use 06/02/2022    PVD (peripheral vascular disease) 06/02/2022    VHD (valvular heart disease) 06/02/2022        Plan:       Acute Exacerbation of HFrEF (EF 30%)  A fib, no Xarelto and rate controlled   Hx of CAD s/p stenting and HLD, HTN  - Most recent TTE on 12/05/2024 demonstrates: LVef 30%, needing ICD  placement  - EKG : afib without st depressions or elevation. Slightly poor r wave progression, LA shift   - Initial BNP 2509.6 Stage D: Refractory HF requiring specialized interventions.  - Fluid restriction at 1200 cc with strict I/Os and daily weights, bolus PRN if pressures are overcorrected   -  Admit weight 220lbs.  Dry weight was noted at 220  previously. Will assess baseline    - Creatinine 1.3 -> 1.9 initially, likely perfusion related - resolved   - Home diuretic dose:  Lasix 40 mg oral b.i.d.  - Maintain on telemetry  - most recent TSH, lipids, A1c at goal.  - Check Electrolytes, keep Mag >2 & K+ >4, at goal  - patient continued on Xarelto 20 b.i.d..  - patient had an ABG episode of V-tach as well as an episode of bradycardia with a rate as low as 20 times reported.    -metoprolol was have from 20/5 b.i.d. to 12.5 b.i.d..    -patient was given calcium gluconate.  -GDM T is held at this time.  Furosemide 40 daily continue.    -patient scheduled to be evaluated for an ICD.  Discussed possibility of life vest with the patient  - Ambulate as tolerated    - Optimal therapy at this time to include :                          - Anti-platelet agent with clopidogrel 70 mg                          - ACE/ARB with Entresto 04/20/2026 - discontinued for now due to pressures                           - BB metoprolol succinate 25 mg q.d.                          - Statin with atorvastatin 80 mg                            - Lasix 20 p.o.                           - Follow up transitional care visit for heart failure at discharge  -  SGLT2 was not initiated due to history of Claudine's gangrene  - Fluid restriction to 1.5 - 2.0 L/day if NYHA stage D, class VI or if hyponatremia symptomatic or < 120 mEq/L     ELIZABETH (resolved)    ESBL UTI  -patient positive for ESBL on 02/15/2025.    -blood cultures x2 and MIRELA, urinalysis reflex to culture with ESBL sensitive to cefepime and Macrobid  -continue cefepime 1 g q.8 hours at this  time.     Hyponatremia (improved)  Elevated bilirubin   Transaminitis  -ELIZABETH prerenal from CHF.- resolved   - Hyponatremia initially. Improved.   -transaminitis was noted without phos suggestive of hepatic biliary congestion.    - improved on interval   -we will do sound abdomen revealed changes of the hepatic and portal veins suggestive of right heart failure.    -acidosis likely secondary to volume overload initially     PAD s/p atherectomy and stenting 2022  -12/06/2020 44 BLE arterial ultrasound consistent with bilateral moderate stenosis with areas of monophasic flow.  Suspicion of venous insufficiency in the past.    -we will continue Plavix 75 mg and Lipitor 80 mg.    -patient is continued on Xarelto 20 mg b.i.d..     Hx of Moderate Normocytic Anemia  Hx of iron deficiency  -patient's H&H 8.5 and 27.9today, likely dilution component as well from patient's current fluid status.    -continue to follow as patient is diuresed.     History of COPD  -p.r.n. DuoNebs q.4 hours.   -p.r.n. albuterol 2 puffs q.6 hours p.r.n.   -saturating 95 97% on room air.    -p.r.n. oxygen N/C        CODE STATUS: Full Code  Access: Peripheral  Antibiotics:  Cefepime 1 g q.8  Diet: Cardiac fluid restriction 1200 mL  DVT Prophylaxis:  Continued on Xarelto at this time.  GI Prophylaxis:  Ondansetron 4 mg 1st, 8 mg 2nd, famotidine  Fluids:  Restricted and as above    Marino Marquez DO  [employer], Internal Medicine, PGY-I      03/02/2025

## 2025-03-03 LAB
ALBUMIN SERPL-MCNC: 3.8 G/DL (ref 3.4–4.8)
ALBUMIN/GLOB SERPL: 1.1 RATIO (ref 1.1–2)
ALP SERPL-CCNC: 168 UNIT/L (ref 40–150)
ALT SERPL-CCNC: 49 UNIT/L (ref 0–55)
ANION GAP SERPL CALC-SCNC: 10 MEQ/L
AST SERPL-CCNC: 38 UNIT/L (ref 5–34)
BACTERIA BLD CULT: NORMAL
BACTERIA BLD CULT: NORMAL
BASOPHILS # BLD AUTO: 0.02 X10(3)/MCL
BASOPHILS NFR BLD AUTO: 0.4 %
BILIRUB SERPL-MCNC: 1.5 MG/DL
BUN SERPL-MCNC: 30 MG/DL (ref 8.4–25.7)
CALCIUM SERPL-MCNC: 9.6 MG/DL (ref 8.8–10)
CHLORIDE SERPL-SCNC: 108 MMOL/L (ref 98–107)
CO2 SERPL-SCNC: 22 MMOL/L (ref 23–31)
CREAT SERPL-MCNC: 0.96 MG/DL (ref 0.72–1.25)
CREAT/UREA NIT SERPL: 31
EOSINOPHIL # BLD AUTO: 0.12 X10(3)/MCL (ref 0–0.9)
EOSINOPHIL NFR BLD AUTO: 2.5 %
ERYTHROCYTE [DISTWIDTH] IN BLOOD BY AUTOMATED COUNT: 21.2 % (ref 11.5–17)
GFR SERPLBLD CREATININE-BSD FMLA CKD-EPI: >60 ML/MIN/1.73/M2
GLOBULIN SER-MCNC: 3.5 GM/DL (ref 2.4–3.5)
GLUCOSE SERPL-MCNC: 95 MG/DL (ref 82–115)
HCT VFR BLD AUTO: 29.2 % (ref 42–52)
HGB BLD-MCNC: 8.9 G/DL (ref 14–18)
HOLD SPECIMEN: NORMAL
IMM GRANULOCYTES # BLD AUTO: 0.02 X10(3)/MCL (ref 0–0.04)
IMM GRANULOCYTES NFR BLD AUTO: 0.4 %
LYMPHOCYTES # BLD AUTO: 1.29 X10(3)/MCL (ref 0.6–4.6)
LYMPHOCYTES NFR BLD AUTO: 26.7 %
MCH RBC QN AUTO: 25.2 PG (ref 27–31)
MCHC RBC AUTO-ENTMCNC: 30.5 G/DL (ref 33–36)
MCV RBC AUTO: 82.7 FL (ref 80–94)
MONOCYTES # BLD AUTO: 0.65 X10(3)/MCL (ref 0.1–1.3)
MONOCYTES NFR BLD AUTO: 13.4 %
NEUTROPHILS # BLD AUTO: 2.74 X10(3)/MCL (ref 2.1–9.2)
NEUTROPHILS NFR BLD AUTO: 56.6 %
NRBC BLD AUTO-RTO: 0 %
PLATELET # BLD AUTO: 204 X10(3)/MCL (ref 130–400)
PMV BLD AUTO: 11 FL (ref 7.4–10.4)
POTASSIUM SERPL-SCNC: 5 MMOL/L (ref 3.5–5.1)
PROT SERPL-MCNC: 7.3 GM/DL (ref 5.8–7.6)
RBC # BLD AUTO: 3.53 X10(6)/MCL (ref 4.7–6.1)
SODIUM SERPL-SCNC: 140 MMOL/L (ref 136–145)
WBC # BLD AUTO: 4.84 X10(3)/MCL (ref 4.5–11.5)

## 2025-03-03 PROCEDURE — 25000003 PHARM REV CODE 250

## 2025-03-03 PROCEDURE — 36415 COLL VENOUS BLD VENIPUNCTURE: CPT

## 2025-03-03 PROCEDURE — 63600175 PHARM REV CODE 636 W HCPCS: Performed by: INTERNAL MEDICINE

## 2025-03-03 PROCEDURE — 25000242 PHARM REV CODE 250 ALT 637 W/ HCPCS

## 2025-03-03 PROCEDURE — 94640 AIRWAY INHALATION TREATMENT: CPT

## 2025-03-03 PROCEDURE — 27000221 HC OXYGEN, UP TO 24 HOURS

## 2025-03-03 PROCEDURE — 85025 COMPLETE CBC W/AUTO DIFF WBC: CPT

## 2025-03-03 PROCEDURE — 21400001 HC TELEMETRY ROOM

## 2025-03-03 PROCEDURE — 94761 N-INVAS EAR/PLS OXIMETRY MLT: CPT

## 2025-03-03 PROCEDURE — 27000207 HC ISOLATION

## 2025-03-03 PROCEDURE — 80053 COMPREHEN METABOLIC PANEL: CPT

## 2025-03-03 PROCEDURE — 36415 COLL VENOUS BLD VENIPUNCTURE: CPT | Performed by: INTERNAL MEDICINE

## 2025-03-03 PROCEDURE — 99900035 HC TECH TIME PER 15 MIN (STAT)

## 2025-03-03 RX ADMIN — CEFEPIME 1 G: 1 INJECTION, POWDER, FOR SOLUTION INTRAMUSCULAR; INTRAVENOUS at 01:03

## 2025-03-03 RX ADMIN — RIVAROXABAN 20 MG: 10 TABLET, FILM COATED ORAL at 04:03

## 2025-03-03 RX ADMIN — CEFEPIME 1 G: 1 INJECTION, POWDER, FOR SOLUTION INTRAMUSCULAR; INTRAVENOUS at 03:03

## 2025-03-03 RX ADMIN — CLOPIDOGREL BISULFATE 75 MG: 75 TABLET ORAL at 08:03

## 2025-03-03 RX ADMIN — FOLIC ACID 1 MG: 1 TABLET ORAL at 08:03

## 2025-03-03 RX ADMIN — CETIRIZINE HYDROCHLORIDE 10 MG: 10 TABLET, FILM COATED ORAL at 08:03

## 2025-03-03 RX ADMIN — IPRATROPIUM BROMIDE AND ALBUTEROL SULFATE 3 ML: .5; 3 SOLUTION RESPIRATORY (INHALATION) at 07:03

## 2025-03-03 RX ADMIN — CHOLECALCIFEROL TAB 25 MCG (1000 UNIT) 1000 UNITS: 25 TAB at 08:03

## 2025-03-03 RX ADMIN — METOPROLOL SUCCINATE 12.5 MG: 25 TABLET, EXTENDED RELEASE ORAL at 08:03

## 2025-03-03 RX ADMIN — FUROSEMIDE 40 MG: 20 TABLET ORAL at 08:03

## 2025-03-03 RX ADMIN — MIDODRINE HYDROCHLORIDE 10 MG: 5 TABLET ORAL at 11:03

## 2025-03-03 RX ADMIN — CEFEPIME 1 G: 1 INJECTION, POWDER, FOR SOLUTION INTRAMUSCULAR; INTRAVENOUS at 08:03

## 2025-03-03 RX ADMIN — ATORVASTATIN CALCIUM 80 MG: 40 TABLET, FILM COATED ORAL at 08:03

## 2025-03-03 RX ADMIN — MIDODRINE HYDROCHLORIDE 10 MG: 5 TABLET ORAL at 08:03

## 2025-03-03 NOTE — PROGRESS NOTES
[facility] Medicine Progress Note    Subjective:   Ran Reyes Jr. is a 69 y.o. male with a history of HFrEF (30% 2024) with valvular insufficiency, with a history of VT arrest with planned ICD placement, CAD, PVD, HTN, HLD, and COPD who presented to [facility] ED on 2025  with complaint of abdominal pain, nausea, general malaise.  Patient had a scheduled cardiology follow-up today that he missed due to transportation issues. 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 Patient states that he noted nausea that started 4 days ago with symptoms worsening notably around 24 hours ago.  Patient also states that he had a urinalysis 11 days ago that was positive for UTI and that he did not start antibiotics. The patient arrived in the ED via EMS.  Patient denies chest pain, shortness of breath, fevers.  Patient notes some tingling in his lips and hands.  Patient reports that he has been adherent to his medications including his diuretics.  With nausea, patient's abdomen noted to be distended and painful.  Bilirubin has been elevated in the past and was 1.8 02/15/2025.  Today, 3.4.  Ultrasound abdomen reveals changes to the hepatic and portal veins suggestive of right heart failure.  No wall thickening, pericholecystic fluid, or calculi that were able to be appreciated were noted.  Due to CHF exacerbation, Hospital Medicine was consulted.     24hr interval  Patient is doing well overnight.  He reports he feels better than when he 1st came in.  He is hemodynamically stable at this time.  Case management consulted for variable life vest.  Cardiology also consulted as currently patient is not on any GDM T due to his pressure is not tolerating and instead he is currently on midodrine.  We will appreciate recommendations with respect to discharge planning.    Objective:   Last 24 Hour Vital Signs:  BP  Min: 96/63  Max: 115/82  Temp  Av.7 °F (36.5 °C)  Min: 97  °F (36.1 °C)  Max: 98.2 °F (36.8 °C)  Pulse  Av.5  Min: 49  Max: 79  Resp  Av.9  Min: 18  Max: 20  SpO2  Av.3 %  Min: 92 %  Max: 100 %  Weight  Av.6 kg (224 lb)  Min: 101.6 kg (224 lb)  Max: 101.6 kg (224 lb)  I/O last 3 completed shifts:  In: 750 [P.O.:750]  Out: 1900 [Urine:1900]    Physical Examination:  Physical Examination:  Vitals:   Vitals:    25 0746   BP:    Pulse: (!) 49   Resp: 18   Temp:        Physical Exam  Constitutional:       Appearance: ill appearance improved    HENT:      Head: Normocephalic.      Mouth/Throat:      Mouth: Mucous membranes are moist.      Pharynx: Oropharynx is clear.   Neck:      Comments: JVD improved   Cardiovascular:      Rate and Rhythm: Normal rate. Rhythm irregular.      Heart sounds: No murmur heard.  Pulmonary:      Effort: Pulmonary effort is normal.      Comments: Crackles improved but present     Abdominal:      General: Bowel sounds are normal. There is distension.   Musculoskeletal:         General: No swelling.      Cervical back: Neck supple.      Right lower leg: Edema present.      Left lower leg: Edema present.      Comments: +1, improved from +3   Skin:     General: Skin is warm.      Coloration: Skin is not jaundiced.      Findings: No erythema or rash.   Neurological:      General: No focal deficit present.      Mental Status: He is alert.      Deep Tendon Reflexes: Reflexes normal.   Psychiatric:         Mood and Affect: Mood normal.     Laboratory:    Recent Labs   Lab 25  0415 25  0416 25  0401 25  0657 25  1146 25  0354 25  0639   WBC  --  4.73 4.86  --   --  4.84  --    HGB  --  9.0* 9.2*  --   --  8.9*  --    HCT  --  29.5* 30.2*  --   --  29.2*  --    PLT  --  200 205  --   --  204  --    MCV  --  84.8 83.7  --   --  82.7  --    RDW  --  21.2* 21.2*  --   --  21.2*  --      --  135* 136 135*  --  140   K 4.9  --  5.4* 5.5* 5.0  --  5.0     --  106 108* 107  --  108*   CO2  "22*  --  22* 20* 20*  --  22*   BUN 34.7*  --  34.6* 32.8* 33.0*  --  30.0*   CREATININE 1.35*  --  1.17 1.02 1.10  --  0.96   ALBUMIN 3.4  --  3.7  --   --   --  3.8   BILITOT 1.3  --  1.3  --   --   --  1.5   AST 46*  --  48*  --   --   --  38*   ALKPHOS 135  --  167*  --   --   --  168*   ALT 61*  --  55  --   --   --  49       Coags:   Lab Results   Component Value Date    INR 2.7 (H) 06/11/2024     FLP:   Lab Results   Component Value Date    CHOL 96 08/14/2024    HDL 32 (L) 08/14/2024    TRIG 73 08/14/2024     DM:   Lab Results   Component Value Date    HGBA1C 5.0 03/18/2024    HGBA1C 4.7 11/15/2021    HGBA1C 5.7 10/07/2020    CREATININE 0.96 03/03/2025     Thyroid:   Lab Results   Component Value Date    TSH 1.097 03/18/2024     Anemia:   Lab Results   Component Value Date    IRON 67 12/16/2024    TIBC 416 12/16/2024    FERRITIN 37.54 12/16/2024    NWXDYIYX10 1,078 (H) 03/18/2024    FOLATE 10.0 03/18/2024     Cardiac:   Lab Results   Component Value Date    TROPONINI 0.023 02/27/2025    BNP 2,509.6 (H) 02/26/2025     Pulm:   No results found for: "PO2ART", "FIO2", "PEEP"   Urinalysis:   Lab Results   Component Value Date    LABURIN >/= 100,000 colonies/ml Escherichia coli ESBL (A) 02/26/2025    COLORU Yellow 02/26/2025    SPECGRAV 1.015 05/15/2024    NITRITE Negative 02/26/2025    KETONESU neg 05/15/2024    UROBILINOGEN Normal 02/26/2025    WBCUA 21-50 (A) 02/26/2025       Trended Cardiac Data:  Recent Labs   Lab 02/26/25  1439 02/27/25  0448   TROPONINI  --  0.023   BNP 2,509.6*  --          Other Results:      Radiology:  Imaging Results              US Abdomen Limited (Final result)  Result time 02/26/25 16:19:49      Final result by Sailaja Atkinson MD (02/26/25 16:19:49)                   Impression:      Changes to the hepatic and portal veins suggestive of right heart failure      Electronically signed by: Sailaja Atkinson  Date:    02/26/2025  Time:    16:19               Narrative:    " EXAMINATION:  US ABDOMEN LIMITED    CLINICAL HISTORY:  Transaminitis;    TECHNIQUE:  Limited ultrasound of the right upper quadrant of the abdomen was performed.    COMPARISON:  Abdomen radiograph 02/26/2025    FINDINGS:  LIVER: 17 cm cranial caudal at the midclavicular line. Normal echotexture. No focal mass appreciable. Portal vein is patent with appropriate directional flow.  Increased pulsatility of the portal vein as can be seen with heart failure or cirrhosis.    PANCREAS: Obscured by overlying bowel gas.    GALLBLADDER: No shadowing calculi, wall thickening, or pericholecystic fluid.    BILE DUCTS: 4 mm common bile duct.  Distal common bile duct is obscured by shadowing bowel gas.    INFERIOR VENA CAVA: Normal in appearance.    RIGHT KIDNEY: 9.3 cm. No hydronephrosis.    OTHER: No ascites.  Distension of the IVC and hepatic veins.                                       X-Ray Abdomen AP 1 View (KUB) (Final result)  Result time 02/26/25 14:42:16      Final result by Lito Hurst MD (02/26/25 14:42:16)                   Impression:      Nonspecific gas pattern      Electronically signed by: Lito Hurst  Date:    02/26/2025  Time:    14:42               Narrative:    EXAMINATION:  XR ABDOMEN AP 1 VIEW    CLINICAL HISTORY:  Unspecified abdominal pain    TECHNIQUE:  AP X-RAY OF THE ABDOMEN:    CPT 11869    FINDINGS:  Examination reveals some residual feces throughout the colon the gas pattern is nonspecific with no clear evidence of ileus or obstruction no abnormal masses or calcifications identified                                      Current Medications:     Infusions:         Scheduled:   atorvastatin  80 mg Oral Daily    ceFEPime IV (PEDS and ADULTS)  1 g Intravenous Q8H    cetirizine  10 mg Oral Daily    clopidogreL  75 mg Oral Daily    folic acid  1 mg Oral Daily    furosemide  40 mg Oral Daily    metoprolol succinate  12.5 mg Oral Daily    midodrine  10 mg Oral TID WM    rivaroxaban  20 mg Oral  Daily    vitamin D  1,000 Units Oral Daily        PRN:    Current Facility-Administered Medications:     acetaminophen, 650 mg, Oral, Q8H PRN    albuterol, 2 puff, Inhalation, Q6H PRN    albuterol-ipratropium, 3 mL, Nebulization, Q6H PRN    famotidine, 20 mg, Oral, BID PRN    guaiFENesin 100 mg/5 ml, 100 mg, Oral, Q6H PRN    labetalol, 20 mg, Intravenous, Q6H PRN    melatonin, 6 mg, Oral, Nightly PRN    nicotine, 1 patch, Transdermal, Daily PRN    ondansetron, 4 mg, Intravenous, Q8H PRN    ondansetron, 8 mg, Intravenous, Q8H PRN    sodium chloride 0.9%, 10 mL, Intravenous, PRN      Assessment:     Ran Reyes  is a 69 y.o.male with  Patient Active Problem List    Diagnosis Date Noted    Abdominal bloating 02/27/2025    Abdominal pain 02/27/2025    Tobacco dependency 02/26/2025    CHF (congestive heart failure) 12/05/2024    Acute cystitis with hematuria 12/05/2024    History of COPD 08/15/2024    Acute heart failure 08/13/2024    Chronic obstructive pulmonary disease, unspecified 05/13/2024    Lesion of external ear 05/13/2024    Low back pain 05/13/2024    Other thrombophilia 05/13/2024    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    Positive colorectal cancer screening using Cologuard test 05/13/2024    Scrotal hematoma 03/18/2024    Postoperative eye state 04/11/2023    HFrEF (heart failure with reduced ejection fraction) 10/06/2022    Nonrheumatic mitral valve regurgitation 10/06/2022    COVID-19 07/24/2022    Primary open angle glaucoma (POAG) of right eye, moderate stage 06/02/2022    Combined forms of age-related cataract of left eye 06/02/2022    Arteriosclerosis of coronary artery 06/02/2022    Chronic atrial fibrillation 06/02/2022    Dyslipidemia 06/02/2022    Hypertension 06/02/2022    Tobacco use 06/02/2022    PVD (peripheral vascular disease) 06/02/2022    VHD (valvular heart disease) 06/02/2022        Plan:       Acute Exacerbation of HFrEF (EF 30%)  A fib, no Xarelto and rate controlled   Hx of  CAD s/p stenting and HLD, HTN  - Most recent TTE on 12/05/2024 demonstrates: LVef 30%, needing ICD placement  - EKG : afib without st depressions or elevation. Slightly poor r wave progression, LA shift   - Initial BNP 2509.6 Stage D: Refractory HF requiring specialized interventions.  - Fluid restriction at 1200 cc with strict I/Os and daily weights, bolus PRN if pressures are overcorrected   -  Admit weight 220lbs.  Dry weight was noted at 220  previously. Will assess baseline    - Creatinine 1.3 -> 1.9 initially, likely perfusion related - resolved   - most recent TSH, lipids, A1c at goal.  - patient continued on Xarelto 20 b.i.d..  - patient had an 8 beat episode of V-tach as well as an episode of bradycardia with a rate as low as 20 times reported.  3/1  -metoprolol currently 12.5 b.i.d.   -patient was given calcium gluconate.  -GDM T is held at this time.  Furosemide 40 daily p.o. continue.    -patient scheduled to be evaluated for an ICD.  Discussed possibility of life vest with the patient  - Ambulate as tolerated    - Optimal therapy at this time to include:                          - Anti-platelet agent with clopidogrel 70 mg                          - ACE/ARB with Entresto 04/20/2026 - discontinued for now due to pressures                           - BB metoprolol succinate 25 mg q.d.                          - Statin with atorvastatin 80 mg                            - Lasix 20 p.o.                           - Follow up transitional care visit for heart failure at discharge  -  SGLT2 was not initiated due to history of Claudine's gangrene  - Fluid restriction to 1.5 - 2.0 L/day if NYHA stage D, class VI or if hyponatremia symptomatic or < 120 mEq/L  -case management consulted for wearable cardioverter-defibrillator (life vest)  -cardiology consulted for restarting of the GDM T appropriately     ELIZABETH (resolved)    ESBL UTI  -patient positive for ESBL on 02/15/2025.    -blood cultures x2 and MIRELA, urinalysis  reflex to culture with ESBL sensitive to cefepime and Macrobid  -continue cefepime 1 g q.8 hours at this time.     Hyponatremia (improved)  Elevated bilirubin (resolved)  Transaminitis (resolved)  -ELIZABETH prerenal from CHF.- resolved   - Hyponatremia initially. Improved.   -transaminitis was noted without phos suggestive of hepatic biliary congestion.    - improved on interval   -we will do sound abdomen revealed changes of the hepatic and portal veins suggestive of right heart failure.    -acidosis likely secondary to volume overload initially     PAD s/p atherectomy and stenting 2022  -12/06/2020 44 BLE arterial ultrasound consistent with bilateral moderate stenosis with areas of monophasic flow.  Suspicion of venous insufficiency in the past.    -we will continue Plavix 75 mg and Lipitor 80 mg.    -patient is continued on Xarelto 20 mg b.i.d..     Hx of Moderate Normocytic Anemia  Hx of iron deficiency  -patient's H&H 8.5 and 27.9today, likely dilution component as well from patient's current fluid status.    -continue to follow as patient is diuresed.     History of COPD  -p.r.n. DuoNebs q.4 hours.   -p.r.n. albuterol 2 puffs q.6 hours p.r.n.   -saturating 95 97% on room air.    -p.r.n. oxygen N/C        CODE STATUS: Full Code  Access: Peripheral  Antibiotics:  Cefepime 1 g q.8  Diet: Cardiac fluid restriction 1200 mL  DVT Prophylaxis:  Continued on Xarelto at this time.  GI Prophylaxis:  Ondansetron 4 mg 1st, 8 mg 2nd, famotidine  Fluids:  Restricted and as above    Marcus Juarez M.D  [facility] Internal Medicine PGY-2        03/03/2025

## 2025-03-03 NOTE — PLAN OF CARE
Received message from Sabra with [employer] stating that LifeVest has been approved and patient will be fit with it today.

## 2025-03-04 VITALS
SYSTOLIC BLOOD PRESSURE: 113 MMHG | HEIGHT: 72 IN | RESPIRATION RATE: 18 BRPM | DIASTOLIC BLOOD PRESSURE: 72 MMHG | OXYGEN SATURATION: 95 % | BODY MASS INDEX: 30.6 KG/M2 | TEMPERATURE: 98 F | HEART RATE: 78 BPM | WEIGHT: 225.88 LBS

## 2025-03-04 PROBLEM — R10.9 ABDOMINAL PAIN: Status: RESOLVED | Noted: 2025-02-27 | Resolved: 2025-03-04

## 2025-03-04 PROBLEM — R14.0 ABDOMINAL BLOATING: Status: RESOLVED | Noted: 2025-02-27 | Resolved: 2025-03-04

## 2025-03-04 LAB
ALBUMIN SERPL-MCNC: 3.8 G/DL (ref 3.4–4.8)
ALBUMIN/GLOB SERPL: 1.1 RATIO (ref 1.1–2)
ALP SERPL-CCNC: 171 UNIT/L (ref 40–150)
ALT SERPL-CCNC: 40 UNIT/L (ref 0–55)
ANION GAP SERPL CALC-SCNC: 9 MEQ/L
AST SERPL-CCNC: 32 UNIT/L (ref 5–34)
BASOPHILS # BLD AUTO: 0.03 X10(3)/MCL
BASOPHILS NFR BLD AUTO: 0.7 %
BILIRUB SERPL-MCNC: 1.4 MG/DL
BUN SERPL-MCNC: 25 MG/DL (ref 8.4–25.7)
CALCIUM SERPL-MCNC: 9.7 MG/DL (ref 8.8–10)
CHLORIDE SERPL-SCNC: 108 MMOL/L (ref 98–107)
CO2 SERPL-SCNC: 24 MMOL/L (ref 23–31)
CREAT SERPL-MCNC: 0.87 MG/DL (ref 0.72–1.25)
CREAT/UREA NIT SERPL: 29
EOSINOPHIL # BLD AUTO: 0.13 X10(3)/MCL (ref 0–0.9)
EOSINOPHIL NFR BLD AUTO: 2.8 %
ERYTHROCYTE [DISTWIDTH] IN BLOOD BY AUTOMATED COUNT: 21.3 % (ref 11.5–17)
GFR SERPLBLD CREATININE-BSD FMLA CKD-EPI: >60 ML/MIN/1.73/M2
GLOBULIN SER-MCNC: 3.5 GM/DL (ref 2.4–3.5)
GLUCOSE SERPL-MCNC: 103 MG/DL (ref 82–115)
HCT VFR BLD AUTO: 29.5 % (ref 42–52)
HGB BLD-MCNC: 9.1 G/DL (ref 14–18)
IMM GRANULOCYTES # BLD AUTO: 0 X10(3)/MCL (ref 0–0.04)
IMM GRANULOCYTES NFR BLD AUTO: 0 %
LYMPHOCYTES # BLD AUTO: 1.19 X10(3)/MCL (ref 0.6–4.6)
LYMPHOCYTES NFR BLD AUTO: 26 %
MCH RBC QN AUTO: 25.6 PG (ref 27–31)
MCHC RBC AUTO-ENTMCNC: 30.8 G/DL (ref 33–36)
MCV RBC AUTO: 82.9 FL (ref 80–94)
MONOCYTES # BLD AUTO: 0.66 X10(3)/MCL (ref 0.1–1.3)
MONOCYTES NFR BLD AUTO: 14.4 %
NEUTROPHILS # BLD AUTO: 2.56 X10(3)/MCL (ref 2.1–9.2)
NEUTROPHILS NFR BLD AUTO: 56.1 %
NRBC BLD AUTO-RTO: 0 %
PLATELET # BLD AUTO: 185 X10(3)/MCL (ref 130–400)
PMV BLD AUTO: 11.2 FL (ref 7.4–10.4)
POTASSIUM SERPL-SCNC: 4.4 MMOL/L (ref 3.5–5.1)
PROT SERPL-MCNC: 7.3 GM/DL (ref 5.8–7.6)
RBC # BLD AUTO: 3.56 X10(6)/MCL (ref 4.7–6.1)
SODIUM SERPL-SCNC: 141 MMOL/L (ref 136–145)
WBC # BLD AUTO: 4.57 X10(3)/MCL (ref 4.5–11.5)

## 2025-03-04 PROCEDURE — 63600175 PHARM REV CODE 636 W HCPCS: Performed by: INTERNAL MEDICINE

## 2025-03-04 PROCEDURE — 25000003 PHARM REV CODE 250

## 2025-03-04 PROCEDURE — 85025 COMPLETE CBC W/AUTO DIFF WBC: CPT

## 2025-03-04 PROCEDURE — 80053 COMPREHEN METABOLIC PANEL: CPT

## 2025-03-04 PROCEDURE — 36415 COLL VENOUS BLD VENIPUNCTURE: CPT

## 2025-03-04 PROCEDURE — 94761 N-INVAS EAR/PLS OXIMETRY MLT: CPT

## 2025-03-04 PROCEDURE — 94640 AIRWAY INHALATION TREATMENT: CPT

## 2025-03-04 PROCEDURE — 25000242 PHARM REV CODE 250 ALT 637 W/ HCPCS

## 2025-03-04 RX ORDER — IBUPROFEN 200 MG
1 TABLET ORAL DAILY PRN
Qty: 7 PATCH | Refills: 0 | Status: SHIPPED | OUTPATIENT
Start: 2025-03-04

## 2025-03-04 RX ORDER — FUROSEMIDE 40 MG/1
40 TABLET ORAL 2 TIMES DAILY
Qty: 60 TABLET | Refills: 1 | Status: SHIPPED | OUTPATIENT
Start: 2025-03-04

## 2025-03-04 RX ORDER — CLOPIDOGREL BISULFATE 75 MG/1
75 TABLET ORAL DAILY
Qty: 30 TABLET | Refills: 1 | Status: SHIPPED | OUTPATIENT
Start: 2025-03-04

## 2025-03-04 RX ORDER — FUROSEMIDE 20 MG/1
40 TABLET ORAL DAILY
Status: DISCONTINUED | OUTPATIENT
Start: 2025-03-04 | End: 2025-03-04 | Stop reason: HOSPADM

## 2025-03-04 RX ORDER — METOPROLOL SUCCINATE 25 MG/1
12.5 TABLET, EXTENDED RELEASE ORAL DAILY
Qty: 45 TABLET | Refills: 3 | Status: SHIPPED | OUTPATIENT
Start: 2025-03-04 | End: 2026-03-04

## 2025-03-04 RX ORDER — NITROFURANTOIN 25; 75 MG/1; MG/1
100 CAPSULE ORAL 2 TIMES DAILY
Qty: 2 CAPSULE | Refills: 0 | Status: SHIPPED | OUTPATIENT
Start: 2025-03-04

## 2025-03-04 RX ORDER — ATORVASTATIN CALCIUM 80 MG/1
80 TABLET, FILM COATED ORAL DAILY
Qty: 90 TABLET | Refills: 3 | Status: SHIPPED | OUTPATIENT
Start: 2025-03-04 | End: 2026-03-04

## 2025-03-04 RX ADMIN — IPRATROPIUM BROMIDE AND ALBUTEROL SULFATE 3 ML: .5; 3 SOLUTION RESPIRATORY (INHALATION) at 12:03

## 2025-03-04 RX ADMIN — CETIRIZINE HYDROCHLORIDE 10 MG: 10 TABLET, FILM COATED ORAL at 09:03

## 2025-03-04 RX ADMIN — METOPROLOL SUCCINATE 12.5 MG: 25 TABLET, EXTENDED RELEASE ORAL at 09:03

## 2025-03-04 RX ADMIN — CEFEPIME 1 G: 1 INJECTION, POWDER, FOR SOLUTION INTRAMUSCULAR; INTRAVENOUS at 03:03

## 2025-03-04 RX ADMIN — CHOLECALCIFEROL TAB 25 MCG (1000 UNIT) 1000 UNITS: 25 TAB at 09:03

## 2025-03-04 RX ADMIN — FOLIC ACID 1 MG: 1 TABLET ORAL at 09:03

## 2025-03-04 RX ADMIN — FUROSEMIDE 40 MG: 20 TABLET ORAL at 11:03

## 2025-03-04 RX ADMIN — CLOPIDOGREL BISULFATE 75 MG: 75 TABLET ORAL at 09:03

## 2025-03-04 RX ADMIN — ATORVASTATIN CALCIUM 80 MG: 40 TABLET, FILM COATED ORAL at 09:03

## 2025-03-04 RX ADMIN — IPRATROPIUM BROMIDE AND ALBUTEROL SULFATE 3 ML: .5; 3 SOLUTION RESPIRATORY (INHALATION) at 06:03

## 2025-03-04 RX ADMIN — CEFEPIME 1 G: 1 INJECTION, POWDER, FOR SOLUTION INTRAMUSCULAR; INTRAVENOUS at 12:03

## 2025-03-04 NOTE — DISCHARGE SUMMARY
[facility] Medicine Discharge Summary    Admitting Physician: Max Boyer MD  Attending Physician: Max Boyer MD  Date of Admit: 2/26/2025  Date of Discharge: 3/4/2025    Condition: Stable  Outcome: Condition has improved and patient is now ready for discharge.  DISPOSITION: Home-Health Care [facility]        Discharge Diagnoses:     Problem List[1]    Principal Problem:  CHF (congestive heart failure)    Consultants and Procedures:     Consultants:  IP CONSULT TO INTERNAL MEDICINE  IP CONSULT TO SOCIAL WORK/CASE MANAGEMENT  IP CONSULT TO CARDIOLOGY  IP CONSULT TO SOCIAL WORK/CASE MANAGEMENT    Procedures:   * No surgery found *      Brief Admission History:      Ran Reyes Jr. is a 69 y.o. male with a history of HFrEF (30% 12/2024) with valvular insufficiency, with a history of VT arrest with planned ICD placement, CAD, PVD, HTN, HLD, and COPD who presented to [facility] ED on 2/26/2025 with complaint of abdominal pain, nausea, general malaise. Patient had a scheduled cardiology follow-up today that he missed due to transportation issues. 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 Patient states that he noted nausea that started 4 days ago with symptoms worsening notably around 24 hours ago. Patient also states that he had a urinalysis 11 days ago that was positive for UTI and that he did not start antibiotics. The patient arrived in the ED via EMS. Patient denies chest pain, shortness of breath, fevers. Patient notes some tingling in his lips and hands. Patient reports that he has been adherent to his medications including his diuretics. With nausea, patient's abdomen noted to be distended and painful. Bilirubin has been elevated in the past and was 1.8 02/15/2025. Today, 3.4. Ultrasound abdomen reveals changes to the hepatic and portal veins suggestive of right heart failure. No wall thickening, pericholecystic fluid, or calculi that were able  to be appreciated were noted. Due to CHF exacerbation, Hospital Medicine was consulted.     Hospital Course with Pertinent Findings:      Patient's CHF exacerbation responded well to 40 mg IV Lasix. Patient's BLE and abdominal edema improved on first interval. Patient was continued on PO Lasix 40 mg. Patient was treated for ESBL UTI with 6 days of cefepime. Culture was sensitive to Macrobid. Will prescribe last day of Abx therapy Macrobid outpatient. Earlier in his stay, patient was noted to have multiple episodes of 6-8 beat V tach. Patient has been evaluated for ICD in the past and is due for follow up 3/5/2025. In the interim, patient was evaluated for and fitted with a life vest external defibrillator. Due to pressures being soft, parts of patient's GDMT were held. Patient to follow up with cardiology outpatient tomorrow. Patient improved during his stay and is considered a candidate for discharge.     Discharge physical exam:  Vitals  BP: 113/72  Temp: 97.6 °F (36.4 °C)  Temp Source: Oral  Pulse: 67  Resp: 18  SpO2: 97 %  Height: 6' (182.9 cm)  Weight: 102.5 kg (225 lb 14.4 oz)    Physical Exam  Constitutional:       Appearance: ill appearance improved    HENT:      Head: Normocephalic.      Mouth/Throat:      Mouth: Mucous membranes are moist.      Pharynx: Oropharynx is clear.   Neck:      Comments: JVD improved   Cardiovascular:      Rate and Rhythm: Normal rate. Rhythm irregular.      Heart sounds: No murmur heard.  Pulmonary:      Effort: Pulmonary effort is normal.      Comments: lung sounds improved.    Abdominal:      General: Bowel sounds are normal. There is distension, improved.   Musculoskeletal:         General: No swelling.      Cervical back: Neck supple.      Right lower leg: Edema present.      Left lower leg: Edema present.      Comments: +1, improved from +3   Skin:     General: Skin is warm.      Coloration: Skin is not jaundiced.      Findings: No erythema or rash.   Neurological:       General: No focal deficit present.      Mental Status: He is alert.      Deep Tendon Reflexes: Reflexes normal.   Psychiatric:         Mood and Affect: Mood normal.     TIME SPENT ON DISCHARGE: 35 minutes    Discharge Medications:         Medication List        START taking these medications      nicotine 14 mg/24 hr  Commonly known as: NICODERM CQ  Place 1 patch onto the skin daily as needed.     nitrofurantoin (macrocrystal-monohydrate) 100 MG capsule  Commonly known as: MACROBID  Take 1 capsule (100 mg total) by mouth 2 (two) times daily.            CONTINUE taking these medications      acetaminophen 325 mg Cap     ALBUTEROL INHL     albuterol-ipratropium 2.5 mg-0.5 mg/3 mL nebulizer solution  Commonly known as: DUO-NEB  Take 3 mLs by nebulization every 6 (six) hours as needed for Wheezing. Rescue     atorvastatin 80 MG tablet  Commonly known as: LIPITOR  Take 1 tablet (80 mg total) by mouth once daily.     BREZTRI AEROSPHERE 160-9-4.8 mcg/actuation [facility]  Generic drug: budesonide-glycopyr-formoterol     cetirizine 10 MG tablet  Commonly known as: ZYRTEC  Take 1 tablet (10 mg total) by mouth once daily.     clopidogreL 75 mg tablet  Commonly known as: PLAVIX  Take 1 tablet (75 mg total) by mouth once daily.     folic acid 1 MG tablet  Commonly known as: FOLVITE  Take 1 tablet (1 mg total) by mouth once daily.     furosemide 40 MG tablet  Commonly known as: LASIX  Take 1 tablet (40 mg total) by mouth 2 (two) times a day.     metoprolol succinate 25 MG 24 hr tablet  Commonly known as: TOPROL-XL  Take 0.5 tablets (12.5 mg total) by mouth once daily.     pantoprazole 40 MG tablet  Commonly known as: PROTONIX  Take 1 tablet (40 mg total) by mouth 2 (two) times a day.     potassium chloride SA 20 MEQ tablet  Commonly known as: K-DUR,KLOR-CON  Take 1 tablet (20 mEq total) by mouth 2 (two) times daily.     rivaroxaban 20 mg Tab  Commonly known as: XARELTO  Take 1 tablet (20 mg total) by mouth Daily.     vitamin D 1000  units Tab  Commonly known as: VITAMIN D3            STOP taking these medications      ENTRESTO 49-51 mg per tablet  Generic drug: sacubitriL-valsartan     spironolactone 25 MG tablet  Commonly known as: ALDACTONE               Where to Get Your Medications        These medications were sent to [place], [place] - [address]  [address] [address]      Phone: [phone]   atorvastatin 80 MG tablet  clopidogreL 75 mg tablet  furosemide 40 MG tablet  metoprolol succinate 25 MG 24 hr tablet  nicotine 14 mg/24 hr  nitrofurantoin (macrocrystal-monohydrate) 100 MG capsule  rivaroxaban 20 mg Tab         Discharge Instructions:         Ran Reyes Jr. is being discharged Home or Self Care.    Continue medications mentioned above     Hold aldactone   Hold entresto at this time. Consider continuing in the future at lower dose.   Go to ICD appointment tomorrow      Discharge Procedure Orders   Ambulatory referral/consult to Smoking Cessation Program   Standing Status: Future   Referral Priority: Routine Referral Type: Consultation   Referral Reason: Specialty Services Required   Requested Specialty: CTTS   Number of Visits Requested: 1     Reason for not Prescribing Nicotine Replacement     Order Specific Question Answer Comments   Reason for not Prescribing: Not medically appropriate at this time already prscribed. that is not one of the two options        Follow-Up Appointments:   Contact information for follow-up providers       Abiodun Recinos,  Follow up in 1 week(s).    Specialty: Internal Medicine  Contact information:  [address] [facility] [address] [phone]               [facility] Follow up today.    Specialty: Emergency Medicine  Why: If symptoms worsen  Contact information:  [address] [address] [phone]                     Contact information for after-discharge care        Home Medical Care       [facility] .    Service: Home Health Services  Contact information:  [address]  [facility] [id number] [phone]                                     To address at follow-up:  -The following labs are to be drawn at the Post Baer visit: cbc, cmp  -The following imaging studies are to be ordered at the post baer visit: NA    At this time, Ran Reyes Jr. is determined to have maximized benefits of IP hospitalization. he is discharged in stable condition with OP f/u recommendations and instructions. All questions answered, and patient verbalized agreement with the POC. They were given return precautions prior to d/c including symptoms that should prompt return to ED or to call PCP. Total time spent of DC of 35 minutes.       Marino Marquez DO  [employer], Internal Medicine, PGY-I             [1]   Patient Active Problem List  Diagnosis    Primary open angle glaucoma (POAG) of right eye, moderate stage    Combined forms of age-related cataract of left eye    Arteriosclerosis of coronary artery    Chronic atrial fibrillation    Dyslipidemia    Hypertension    Tobacco use    PVD (peripheral vascular disease)    VHD (valvular heart disease)    COVID-19    HFrEF (heart failure with reduced ejection fraction)    Nonrheumatic mitral valve regurgitation    Postoperative eye state    Scrotal hematoma    Chronic obstructive pulmonary disease, unspecified    Lesion of external ear    Low back pain    Other thrombophilia    Secondary hyperaldosteronism    Positive colorectal cancer screening using Cologuard test    Acute heart failure    History of COPD    CHF (congestive heart failure)    Acute cystitis with hematuria    Tobacco dependency

## 2025-03-04 NOTE — CONSULTS
[facility]  Inpatient Cardiology Consult    Reason for Consult: hypotension in setting of HF treatment                  HPI:  Ran Reyes Jr. 69 y.o. male with NICM-HFrEF with history of VT arrest, non obstructive epicardial coronary artery disease, persistent longstanding atrial fibrillation, peripheral artery disease hypertension, hypercholesterolemia, and tobacco abuse who was admitted with acute on chronic heart failure with reduced EF and ESBL UTI.     Pt was seen in clinic on 1/28/2025 and was noted to have NYHA class III symptoms. Lasix was increased to 40mg bid.   Pt not sure he has been taking lasix 40mg bid.    Pt was being diuresed and on 3/1/2025 was noted to be hypotensive to the 70s systolics (symptomatic). Diuresis and entresto were stopped and pt was started on midodrine.       *To note, pt is planned for ICD implantation on 3/12/2025.                                                                                                                                                                                                                                                                                                                                                                                                                                                                             CARDIAC TESTING:  Results for orders placed during the hospital encounter of 12/05/24    Echo    Interpretation Summary    Left Ventricle: The left ventricle is normal in size. Normal wall thickness. There is reduced systolic function. Quantitated ejection fraction is 30%.    Right Ventricle: Mild right ventricular enlargement. Systolic function is mildly reduced.    Left Atrium: Left atrium is moderately dilated.    Right Atrium: Right atrium is moderately dilated.    Aortic Valve: The aortic valve is a trileaflet valve. There is no stenosis. Aortic valve peak velocity is 1.8 m/s. Mean gradient is 6.3 mmHg.    Mitral  Valve: The mitral valve is structurally normal. The mean pressure gradient across the mitral valve is 4 mmHg at a heart rate of  bpm. There is moderate regurgitation.    Tricuspid Valve: There is mild to moderate regurgitation.    Pulmonary Artery: The estimated pulmonary artery systolic pressure is 44 mmHg.    IVC/SVC: Intermediate venous pressure at 8 mmHg.    No results found for this or any previous visit.     Results for orders placed during the hospital encounter of 03/17/24    Cardiac catheterization    Conclusion  The procedure log was documented by No documenter listed and verified by Adriano Montiel MD.    Procedure:  Selective coronary angiography  Moderate (Conscious) Sedation      Indication: VT arrest, known cardiomyopathy, NSTEMI  Consent: The patient was brought to the cardiac catheterization lab. Was instructed and explained about the risk, benefit and alternatives of the procedure included but not limited to sudden cardiac death, myocardial infarction, bleeding, vascular injury, renal failure, stroke, contrast allergy, risk of conscious sedation and need for emergent bypass surgery.  the patient was agreeable to proceed.  Signed the consent form.  Access:  The patient was prepped using the usual sterile fashion.  Right radial artery  was accessed with micropuncture technique, ultrasound guidance.  An image of the ultrasound was put in the paper chart for purpose of documentation.  Sheath size:6F    Kirill test:  Verified with pulse oximetry deemed to be adequate for radial artery procedure.    Diagnostic catheters : TIG, JL 3.5    Coronary findings:    Dominance: right  Left main:  10-20% calcified distal left main disease  Left anterior descending artery:  50% calcified proximal stenosis  Circumflex artery:  Luminal irregularities  Right coronary artery:  Luminal irregularities    Access Closure:  At the end of the procedure the sheath was removed. A TR band applied to the right radial artery .  Excellent hemostasis achieved.        Prior to Admission medications    Medication Sig Start Date End Date Taking? Authorizing Provider   atorvastatin (LIPITOR) 80 MG tablet Take 1 tablet (80 mg total) by mouth once daily. 8/8/24 8/8/25 Yes Abiodun Recinos DO   clopidogreL (PLAVIX) 75 mg tablet Take 75 mg by mouth once daily.   Yes Provider, Historical   ENTRESTO 49-51 mg per tablet Take 1 tablet by mouth 2 (two) times daily. 12/20/24  Yes Provider, Historical   folic acid (FOLVITE) 1 MG tablet Take 1 tablet (1 mg total) by mouth once daily. 4/9/24 4/9/25 Yes Tha Edmond MD   furosemide (LASIX) 40 MG tablet Take 1 tablet (40 mg total) by mouth 2 (two) times a day. 9/23/24  Yes Aung Verduzco MD   pantoprazole (PROTONIX) 40 MG tablet Take 1 tablet (40 mg total) by mouth 2 (two) times a day. 9/23/24  Yes Abiodun Recinos DO   potassium chloride SA (K-DUR,KLOR-CON) 20 MEQ tablet Take 1 tablet (20 mEq total) by mouth 2 (two) times daily. 1/28/25 1/28/26 Yes Vernon Cifuentes MD   rivaroxaban (XARELTO) 20 mg Tab Take 1 tablet (20 mg total) by mouth Daily. 7/16/24  Yes Joe Clarke MD   spironolactone (ALDACTONE) 25 MG tablet Take 25 mg by mouth once daily. 11/11/24  Yes Provider, Historical   acetaminophen 325 mg Cap Take 650 mg by mouth 2 (two) times daily as needed.    Provider, Historical   ALBUTEROL INHL Inhale 2 puffs into the lungs every 6 (six) hours as needed.    Provider, Historical   albuterol-ipratropium (DUO-NEB) 2.5 mg-0.5 mg/3 mL nebulizer solution Take 3 mLs by nebulization every 6 (six) hours as needed for Wheezing. Rescue 12/9/24   Diana Hassan MD BREZTRI AEROSPHERE 160-9-4.8 mcg/actuation HFAA Inhale 2 puffs into the lungs 2 (two) times daily. 1/7/25   Provider, Historical   cetirizine (ZYRTEC) 10 MG tablet Take 1 tablet (10 mg total) by mouth once daily. 8/8/24 8/3/25  Abiodun Recinos DO   metoprolol succinate (TOPROL-XL) 25 MG 24 hr tablet Take 0.5 tablets (12.5 mg total) by mouth once  daily. 12/30/24 12/30/25  Shanel Dyer,    vitamin D (VITAMIN D3) 1000 units Tab Take 1,000 Units by mouth once daily.    Provider, Historical       Inpatient Scheduled Medications:   atorvastatin  80 mg Oral Daily    ceFEPime IV (PEDS and ADULTS)  1 g Intravenous Q8H    cetirizine  10 mg Oral Daily    clopidogreL  75 mg Oral Daily    folic acid  1 mg Oral Daily    metoprolol succinate  12.5 mg Oral Daily    rivaroxaban  20 mg Oral Daily    vitamin D  1,000 Units Oral Daily     Continuous Infusions:      ROS:                                                                                                                                                                             Negative except as stated in the history of present illness. See HPI for details.    PHYSICAL EXAM:  VITALS: T 97.2 °F (36.2 °C)   /77   P 61   RR 18   O2 96 %    General: alert and oriented/no acute distress  Eye: EOMI/normal conjunctiva/no xanthelasma  HENT: normocephalic/moist oral mucosa  Neck: supple/nontender/no carotid bruit  Respiratory: lungs CTA/nonlabored respirations/BS equal/symmetrical expansion/no  chest wall tenderness  Cardiovascular: normal rate/normal rhythm/no murmur/normal peripheral perfusion/no  edema/no JVD  Gastrointestinal: soft/nontender  Musculoskeletal: normal ROM  Integumentary: warm/dry/pink/intact  Neurologic: alert/oriented/normal sensory/no focal deficits  Psychiatric: cooperative/appropriate mood and affect/normal judgment    All medications, laboratory studies, cardiac diagnostic imaging independently reviewed.     ASSESSMENT/PLAN:    Acute on chronic heart failure with reduced EF  Pt's transient hypotension likely in the setting of rapid removal of large amount of fluid.   -Recommend holding midodrine and diuretic at this time and allowing the pt to re-equilibrate.  -If SBP consistently improves to > 100mmHg, can resume entresto at lowest dose. Continue to hold aldactone.  -Agree with continuing  toprol 12.5mg.  -Continue to avoid SGLT2i given hx of Claudine's gangrene  -Agree with lifevest as pt having NSVT episodes. Pt already planned for ICD implantation of 3/12/2025.      Dayanna Johnson MD  Cardiology staff

## 2025-03-05 ENCOUNTER — DOCUMENT SCAN (OUTPATIENT)
Dept: HOME HEALTH SERVICES | Facility: HOSPITAL | Age: 70
End: 2025-03-05
Payer: MEDICARE

## 2025-03-05 ENCOUNTER — PATIENT OUTREACH (OUTPATIENT)
Dept: ADMINISTRATIVE | Facility: CLINIC | Age: 70
End: 2025-03-05
Payer: MEDICARE

## 2025-03-05 LAB
OHS QRS DURATION: 90 MS
OHS QRS DURATION: 90 MS
OHS QTC CALCULATION: 429 MS
OHS QTC CALCULATION: 435 MS

## 2025-03-05 NOTE — PROGRESS NOTES
C3 nurse spoke with Ran Reyes Jr. for a TCC post hospital discharge follow up call. The patient does not have a scheduled HOSFU appointment with Abiodun Recinos DO within 5-7 days post hospital discharge date 3/4. C3 nurse was unable to schedule HOSFU appointment in [facility]. Message sent to PCP staff requesting they contact patient and schedule follow up appointment.

## 2025-03-06 NOTE — PROGRESS NOTES
Informed by Ledy Ken with [facility] pt is current with Mark Gutierrez who is following at home for HH services.

## 2025-03-07 ENCOUNTER — CLINICAL SUPPORT (OUTPATIENT)
Dept: CARDIOLOGY | Facility: CLINIC | Age: 70
End: 2025-03-07
Payer: MEDICARE

## 2025-03-07 ENCOUNTER — LAB VISIT (OUTPATIENT)
Dept: LAB | Facility: HOSPITAL | Age: 70
End: 2025-03-07
Attending: INTERNAL MEDICINE
Payer: MEDICARE

## 2025-03-07 VITALS
SYSTOLIC BLOOD PRESSURE: 120 MMHG | OXYGEN SATURATION: 98 % | HEART RATE: 73 BPM | DIASTOLIC BLOOD PRESSURE: 87 MMHG | HEIGHT: 72 IN | RESPIRATION RATE: 20 BRPM | BODY MASS INDEX: 30.2 KG/M2 | WEIGHT: 223 LBS

## 2025-03-07 DIAGNOSIS — I25.10 ARTERIOSCLEROSIS OF CORONARY ARTERY: ICD-10-CM

## 2025-03-07 DIAGNOSIS — I34.0 NONRHEUMATIC MITRAL VALVE REGURGITATION: ICD-10-CM

## 2025-03-07 DIAGNOSIS — N39.0 URINARY TRACT INFECTION WITHOUT HEMATURIA, SITE UNSPECIFIED: Primary | ICD-10-CM

## 2025-03-07 DIAGNOSIS — I50.20 HFREF (HEART FAILURE WITH REDUCED EJECTION FRACTION): ICD-10-CM

## 2025-03-07 DIAGNOSIS — I38 VHD (VALVULAR HEART DISEASE): ICD-10-CM

## 2025-03-07 DIAGNOSIS — I10 PRIMARY HYPERTENSION: ICD-10-CM

## 2025-03-07 DIAGNOSIS — I73.9 PVD (PERIPHERAL VASCULAR DISEASE): ICD-10-CM

## 2025-03-07 DIAGNOSIS — I50.9 CONGESTIVE HEART FAILURE, UNSPECIFIED HF CHRONICITY, UNSPECIFIED HEART FAILURE TYPE: ICD-10-CM

## 2025-03-07 DIAGNOSIS — J44.9 CHRONIC OBSTRUCTIVE PULMONARY DISEASE, UNSPECIFIED COPD TYPE: Primary | ICD-10-CM

## 2025-03-07 DIAGNOSIS — N39.0 URINARY TRACT INFECTION WITHOUT HEMATURIA, SITE UNSPECIFIED: ICD-10-CM

## 2025-03-07 DIAGNOSIS — J44.9 CHRONIC OBSTRUCTIVE PULMONARY DISEASE, UNSPECIFIED COPD TYPE: ICD-10-CM

## 2025-03-07 LAB
ALBUMIN SERPL-MCNC: 3.9 G/DL (ref 3.4–4.8)
APTT PPP: 47.4 SECONDS (ref 23.2–33.7)
BACTERIA #/AREA URNS AUTO: ABNORMAL /HPF
BILIRUB UR QL STRIP.AUTO: NEGATIVE
BUN SERPL-MCNC: 23.5 MG/DL (ref 8.4–25.7)
CALCIUM SERPL-MCNC: 9.7 MG/DL (ref 8.8–10)
CHLORIDE SERPL-SCNC: 105 MMOL/L (ref 98–107)
CLARITY UR: ABNORMAL
CO2 SERPL-SCNC: 23 MMOL/L (ref 23–31)
COLOR UR AUTO: YELLOW
CREAT SERPL-MCNC: 1.08 MG/DL (ref 0.72–1.25)
GFR SERPLBLD CREATININE-BSD FMLA CKD-EPI: >60 ML/MIN/1.73/M2
GLUCOSE SERPL-MCNC: 105 MG/DL (ref 82–115)
GLUCOSE UR QL STRIP: NORMAL
HGB UR QL STRIP: ABNORMAL
HYALINE CASTS #/AREA URNS LPF: ABNORMAL /LPF
INR PPP: 3.8
KETONES UR QL STRIP: NEGATIVE
LEUKOCYTE ESTERASE UR QL STRIP: 500
MUCOUS THREADS URNS QL MICRO: ABNORMAL /LPF
NITRITE UR QL STRIP: NEGATIVE
PH UR STRIP: 5.5 [PH]
PHOSPHATE SERPL-MCNC: 3 MG/DL (ref 2.3–4.7)
POTASSIUM SERPL-SCNC: 4.2 MMOL/L (ref 3.5–5.1)
PROT UR QL STRIP: ABNORMAL
PROTHROMBIN TIME: 38.1 SECONDS (ref 11.4–14)
RBC #/AREA URNS AUTO: ABNORMAL /HPF
SODIUM SERPL-SCNC: 140 MMOL/L (ref 136–145)
SP GR UR STRIP.AUTO: 1.01 (ref 1–1.03)
SQUAMOUS #/AREA URNS LPF: ABNORMAL /HPF
UROBILINOGEN UR STRIP-ACNC: NORMAL
WBC #/AREA URNS AUTO: ABNORMAL /HPF

## 2025-03-07 PROCEDURE — 85610 PROTHROMBIN TIME: CPT

## 2025-03-07 PROCEDURE — 85730 THROMBOPLASTIN TIME PARTIAL: CPT

## 2025-03-07 PROCEDURE — 36415 COLL VENOUS BLD VENIPUNCTURE: CPT

## 2025-03-07 PROCEDURE — 81015 MICROSCOPIC EXAM OF URINE: CPT

## 2025-03-07 PROCEDURE — 99214 OFFICE O/P EST MOD 30 MIN: CPT | Mod: PBBFAC

## 2025-03-07 PROCEDURE — 80069 RENAL FUNCTION PANEL: CPT

## 2025-03-07 NOTE — PROGRESS NOTES
Here for pre-procedure teaching. Instructions discussed, questions encouraged and answered, print outs given.

## 2025-03-11 ENCOUNTER — TELEPHONE (OUTPATIENT)
Dept: CARDIOLOGY | Facility: CLINIC | Age: 70
End: 2025-03-11
Payer: MEDICARE

## 2025-03-11 NOTE — TELEPHONE ENCOUNTER
Mr. Reyes continues to have UTI. Presented to Dr. Verduzco. He will be rescheduled until this is resolved. Mr. Reyes notified and tentatively scheduled for April 16th. ED precautions discussed and instructed to continue wearing Life Vest.   The following message has been sent to his PCP, Abiodun Recinos.     Good morning, Mr. Ran Reyes was scheduled for ICD implant tomorrow, 3-12-25, which has been rescheduled to  April 16th in hopes that his UTI can get under control. He does not currently have an appointment with you but this needs to be addressed soon. Please ask your staff to contact him. I will reach out to Medicine clinic as well to see if they can work him in soon.    Thank you. Perri, [address].

## 2025-03-20 ENCOUNTER — CLINICAL SUPPORT (OUTPATIENT)
Dept: SMOKING CESSATION | Facility: CLINIC | Age: 70
End: 2025-03-20
Payer: COMMERCIAL

## 2025-03-20 DIAGNOSIS — F17.200 NICOTINE DEPENDENCE: Primary | ICD-10-CM

## 2025-03-20 RX ORDER — IBUPROFEN 200 MG
1 TABLET ORAL DAILY
Qty: 28 PATCH | Refills: 0 | Status: SHIPPED | OUTPATIENT
Start: 2025-03-20

## 2025-03-20 NOTE — PROGRESS NOTES
Individual Follow-Up Form    3/20/2025    Quit Date: ***    Clinical Status of Patient: Outpatient    Length of Service: {Smoking Length of Service:59338}    Continuing Medication: {Smoking Cessation Yes/No with medication:02972}    Other Medications: ***     Target Symptoms: Withdrawal and medication side effects. The following were  rated moderate (3) to severe (4) on TCRS:  Moderate (3): ***  Severe (4): ***    Comments: ***    Diagnosis: {Smoking Cessation Diagnosis:40198}    Next Visit: {Smoking Cessation Next Phone Intervention:49675}

## 2025-03-20 NOTE — PROGRESS NOTES
Spoke with patient  this afternoon regarding smoking cessation program. Pt will be participating in biweekly tobacco cessation meetings and will begin the prescribed tobacco cessation medication regimen of  21 mg nicotine patch QD. Patient currently smokes 6-7 cigarettes per day.  FTND score of 0 indicates a low level of nicotine dependence, JULIAN-D score of 5 perceived as no  degree of mental distress /probable depression. Discussed and reviewed with patient the role of tobacco cessation program, role of nicotine replacement therapy  (NRT), medication along with behavioral therapy & counseling to assist the patient to reach his goal of being tobacco free. Education and instruction on the role of the NRT, usage, proper placement of the patch and possible side effects. Reviewed behavioral modification strategy of rate reduction and wait time of 15 min prior to smoking. Patient verbalized understanding and willingness to use patch. Patient instructed to call me with any questions or concerns.

## 2025-03-24 ENCOUNTER — HOSPITAL ENCOUNTER (EMERGENCY)
Facility: HOSPITAL | Age: 70
Discharge: HOME | End: 2025-03-24
Attending: EMERGENCY MEDICINE
Payer: MEDICARE

## 2025-03-24 VITALS
BODY MASS INDEX: 30.48 KG/M2 | TEMPERATURE: 98 F | HEART RATE: 85 BPM | WEIGHT: 225 LBS | RESPIRATION RATE: 23 BRPM | SYSTOLIC BLOOD PRESSURE: 109 MMHG | OXYGEN SATURATION: 98 % | HEIGHT: 72 IN | DIASTOLIC BLOOD PRESSURE: 85 MMHG

## 2025-03-24 DIAGNOSIS — R06.02 SOB (SHORTNESS OF BREATH): ICD-10-CM

## 2025-03-24 DIAGNOSIS — I50.9 CONGESTIVE HEART FAILURE, UNSPECIFIED HF CHRONICITY, UNSPECIFIED HEART FAILURE TYPE: Primary | ICD-10-CM

## 2025-03-24 DIAGNOSIS — N30.01 ACUTE CYSTITIS WITH HEMATURIA: ICD-10-CM

## 2025-03-24 LAB
ACANTHOCYTES (OLG): ABNORMAL
ALBUMIN SERPL-MCNC: 3.8 G/DL (ref 3.4–4.8)
ALBUMIN/GLOB SERPL: 0.9 RATIO (ref 1.1–2)
ALP SERPL-CCNC: 219 UNIT/L (ref 40–150)
ALT SERPL-CCNC: 15 UNIT/L (ref 0–55)
ANION GAP SERPL CALC-SCNC: 11 MEQ/L
ANISOCYTOSIS BLD QL SMEAR: SLIGHT
AST SERPL-CCNC: 20 UNIT/L (ref 11–45)
BACTERIA #/AREA URNS AUTO: ABNORMAL /HPF
BASOPHILS # BLD AUTO: 0.03 X10(3)/MCL
BASOPHILS NFR BLD AUTO: 0.4 %
BILIRUB SERPL-MCNC: 3.8 MG/DL
BILIRUB UR QL STRIP.AUTO: NEGATIVE
BNP BLD-MCNC: 1459.6 PG/ML
BUN SERPL-MCNC: 16.2 MG/DL (ref 8.4–25.7)
BURR CELLS (OLG): SLIGHT
CALCIUM SERPL-MCNC: 9.7 MG/DL (ref 8.8–10)
CHLORIDE SERPL-SCNC: 101 MMOL/L (ref 98–107)
CLARITY UR: ABNORMAL
CO2 SERPL-SCNC: 24 MMOL/L (ref 23–31)
COLOR UR AUTO: YELLOW
CREAT SERPL-MCNC: 1.53 MG/DL (ref 0.72–1.25)
CREAT/UREA NIT SERPL: 11
ELLIPTOCYTOSIS (OHS): ABNORMAL
EOSINOPHIL # BLD AUTO: 0.89 X10(3)/MCL (ref 0–0.9)
EOSINOPHIL NFR BLD AUTO: 12.6 %
ERYTHROCYTE [DISTWIDTH] IN BLOOD BY AUTOMATED COUNT: 22.5 % (ref 11.5–17)
FLUAV AG UPPER RESP QL IA.RAPID: NOT DETECTED
FLUBV AG UPPER RESP QL IA.RAPID: NOT DETECTED
GFR SERPLBLD CREATININE-BSD FMLA CKD-EPI: 50 ML/MIN/1.73/M2
GLOBULIN SER-MCNC: 4.1 GM/DL (ref 2.4–3.5)
GLUCOSE SERPL-MCNC: 102 MG/DL (ref 82–115)
GLUCOSE UR QL STRIP: NORMAL
HCT VFR BLD AUTO: 29.6 % (ref 42–52)
HGB BLD-MCNC: 9.4 G/DL (ref 14–18)
HGB UR QL STRIP: ABNORMAL
HOLD SPECIMEN: NORMAL
HOLD SPECIMEN: NORMAL
HYALINE CASTS #/AREA URNS LPF: ABNORMAL /LPF
HYPOCHROMIA BLD QL SMEAR: SLIGHT
IMM GRANULOCYTES # BLD AUTO: 0.03 X10(3)/MCL (ref 0–0.04)
IMM GRANULOCYTES NFR BLD AUTO: 0.4 %
KETONES UR QL STRIP: NEGATIVE
LEUKOCYTE ESTERASE UR QL STRIP: 500
LYMPHOCYTES # BLD AUTO: 1.44 X10(3)/MCL (ref 0.6–4.6)
LYMPHOCYTES NFR BLD AUTO: 20.3 %
MCH RBC QN AUTO: 25.3 PG (ref 27–31)
MCHC RBC AUTO-ENTMCNC: 31.8 G/DL (ref 33–36)
MCV RBC AUTO: 79.6 FL (ref 80–94)
MONOCYTES # BLD AUTO: 0.64 X10(3)/MCL (ref 0.1–1.3)
MONOCYTES NFR BLD AUTO: 9 %
MUCOUS THREADS URNS QL MICRO: ABNORMAL /LPF
NEUTROPHILS # BLD AUTO: 4.06 X10(3)/MCL (ref 2.1–9.2)
NEUTROPHILS NFR BLD AUTO: 57.3 %
NITRITE UR QL STRIP: NEGATIVE
NRBC BLD AUTO-RTO: 0 %
PH UR STRIP: 5.5 [PH]
PLATELET # BLD AUTO: 215 X10(3)/MCL (ref 130–400)
PLATELET # BLD EST: NORMAL 10*3/UL
PMV BLD AUTO: 10.8 FL (ref 7.4–10.4)
POLYCHROMASIA BLD QL SMEAR: ABNORMAL
POTASSIUM SERPL-SCNC: 3.7 MMOL/L (ref 3.5–5.1)
PROT SERPL-MCNC: 7.9 GM/DL (ref 5.8–7.6)
PROT UR QL STRIP: ABNORMAL
RBC # BLD AUTO: 3.72 X10(6)/MCL (ref 4.7–6.1)
RBC #/AREA URNS AUTO: ABNORMAL /HPF
SARS-COV-2 RNA RESP QL NAA+PROBE: NOT DETECTED
SODIUM SERPL-SCNC: 136 MMOL/L (ref 136–145)
SP GR UR STRIP.AUTO: 1.01 (ref 1–1.03)
SQUAMOUS #/AREA URNS LPF: ABNORMAL /HPF
TROPONIN I SERPL-MCNC: 0.01 NG/ML (ref 0–0.04)
UROBILINOGEN UR STRIP-ACNC: ABNORMAL
WBC # BLD AUTO: 7.09 X10(3)/MCL (ref 4.5–11.5)
WBC #/AREA URNS AUTO: ABNORMAL /HPF

## 2025-03-24 PROCEDURE — 80053 COMPREHEN METABOLIC PANEL: CPT | Performed by: EMERGENCY MEDICINE

## 2025-03-24 PROCEDURE — 83880 ASSAY OF NATRIURETIC PEPTIDE: CPT | Performed by: EMERGENCY MEDICINE

## 2025-03-24 PROCEDURE — 85025 COMPLETE CBC W/AUTO DIFF WBC: CPT | Performed by: EMERGENCY MEDICINE

## 2025-03-24 PROCEDURE — 81015 MICROSCOPIC EXAM OF URINE: CPT | Performed by: EMERGENCY MEDICINE

## 2025-03-24 PROCEDURE — 0240U COVID/FLU A&B PCR: CPT | Performed by: EMERGENCY MEDICINE

## 2025-03-24 PROCEDURE — 93005 ELECTROCARDIOGRAM TRACING: CPT

## 2025-03-24 PROCEDURE — 99285 EMERGENCY DEPT VISIT HI MDM: CPT | Mod: 25

## 2025-03-24 PROCEDURE — 25000003 PHARM REV CODE 250: Performed by: EMERGENCY MEDICINE

## 2025-03-24 PROCEDURE — 87077 CULTURE AEROBIC IDENTIFY: CPT | Performed by: EMERGENCY MEDICINE

## 2025-03-24 PROCEDURE — 84484 ASSAY OF TROPONIN QUANT: CPT | Performed by: EMERGENCY MEDICINE

## 2025-03-24 RX ORDER — CIPROFLOXACIN 500 MG/1
500 TABLET ORAL
Status: COMPLETED | OUTPATIENT
Start: 2025-03-24 | End: 2025-03-24

## 2025-03-24 RX ORDER — CIPROFLOXACIN 500 MG/1
500 TABLET ORAL 2 TIMES DAILY
Qty: 14 TABLET | Refills: 0 | Status: SHIPPED | OUTPATIENT
Start: 2025-03-24 | End: 2025-03-31

## 2025-03-24 RX ADMIN — CIPROFLOXACIN 500 MG: 500 TABLET ORAL at 03:03

## 2025-03-24 NOTE — DISCHARGE INSTRUCTIONS
Change antibiotics as prescribed for urinary tract infection.      Tests are generally stable, continue usual medications and follow up as previously scheduled.

## 2025-03-24 NOTE — ED PROVIDER NOTES
Encounter Date: 3/24/2025       History     Chief Complaint   Patient presents with    Shortness of Breath     Patient reports shortness of breath since last night. Denies chest pain. Patient wearing a Lifevest. Patient agitated for every question triage nurse asks. Patients O2 @ 98% on RA.     Complex past history, known to me from previous ER evaluations.  Recent admission at this facility, discharged about 2 or 3 weeks ago.  Underlying history of hypertension, COPD, congestive heart failure, atrial fibrillation, chronic anticoagulation, peripheral vascular disease, volume overload, coronary artery disease, aortic aneurysm, many others.  He reports good compliance with his medications and wearing his life vest which he is wearing currently.  Overall his degree of edema and fluid restriction seems stable or slightly improved.  He has had dyspnea on and off since last night without chest pain.  He did have some abdominal discomfort which has resolved at present.  Mild degree of dry cough without fever.  Some decreased ability to sleep.  Recent antibiotics for urinary tract infection, he feels that it has not fully resolved as there is still significant urinary odor.  Equivocal about whether he feels bad enough to be readmitted at present, he states he is actually feeling better right now.  No other particular complaints.    The history is provided by the patient and medical records. No  was used.     Review of patient's allergies indicates:  No Known Allergies  Past Medical History:   Diagnosis Date    A-fib     Anticoagulant long-term use     Anxiety     Aortic aneurysm     Cataract     CHF (congestive heart failure)     Chronic atrial fibrillation     COPD (chronic obstructive pulmonary disease)     Coronary artery disease     Depression     HLD (hyperlipidemia)     Hypertension     Mitral regurgitation     PAD (peripheral artery disease)     Primary open angle glaucoma (POAG)      Past  Surgical History:   Procedure Laterality Date    ANGIOGRAM, CORONARY, WITH LEFT HEART CATHETERIZATION N/A 03/26/2024    Procedure: Angiogram, Coronary, with Left Heart Cath;  Surgeon: Adriano Montiel MD;  Location: [facility] CATH LAB;  Service: Cardiology;  Laterality: N/A;    ATHERECTOMY OF PERIPHERAL VESSEL Left 09/12/2022    LEFT SFA ATHERECTOMY, BALLOON ANGIOPLASTY    CATARACT EXTRACTION W/  INTRAOCULAR LENS IMPLANT Left 03/09/2023    Procedure: EXTRACTION, CATARACT, WITH IOL INSERTION;  Surgeon: Georgi Borja MD;  Location: [facility] OR;  Service: Ophthalmology;  Laterality: Left;  19.5  mac    EGD, WITH CLOSED BIOPSY  03/22/2024    Procedure: EGD, WITH CLOSED BIOPSY;  Surgeon: Ronald Calderon MD;  Location: [facility] ENDOSCOPY;  Service: Gastroenterology;;    ESOPHAGOGASTRODUODENOSCOPY N/A 03/22/2024    Procedure: EGD;  Surgeon: Ronald Calderon MD;  Location: [facility] ENDOSCOPY;  Service: Gastroenterology;  Laterality: N/A;    Heart Stent N/A     > 10yrs. AGO    INCISION AND DRAINAGE N/A 03/18/2024    Procedure: Incision and Drainage;  Surgeon: Fahad Rivas MD;  Location: [facility];  Service: Urology;  Laterality: N/A;  I&D SCROTAL ABSCESS    INSERTION OF STENT INTO PERIPHERAL VESSEL Right 10/17/2022    RIGHT SFA ATHERECTOMY, BALLOON ANGIOPLASTY, STENT; RIGHT ANTERIOR TIBIAL ARTERY ATHERECTOMY, BALLOON ANGIOPLASTY    ORCHIECTOMY Left 03/18/2024    Procedure: ORCHIECTOMY;  Surgeon: Fahad Rivas MD;  Location: [facility];  Service: Urology;  Laterality: Left;     Family History   Problem Relation Name Age of Onset    Cancer Mother      Hypertension Mother      Heart failure Father      Stroke Father      Hypertension Father      No Known Problems Sister       Social History[1]  Review of Systems   Respiratory:  Positive for cough and shortness of breath.    Cardiovascular:  Positive for leg swelling.   Gastrointestinal:  Positive for abdominal distention and abdominal pain.   Genitourinary:   Negative for decreased urine volume, difficulty urinating and dysuria.   Neurological:  Negative for syncope and weakness.       Physical Exam     Initial Vitals   BP Pulse Resp Temp SpO2   03/24/25 1207 03/24/25 1206 03/24/25 1206 03/24/25 1410 03/24/25 1207   111/74 76 (!) 23 97.8 °F (36.6 °C) 96 %      MAP       --                Physical Exam    Nursing note and vitals reviewed.  Constitutional: No distress.   HENT:   Head: Normocephalic and atraumatic.   Eyes: EOM are normal. Pupils are equal, round, and reactive to light.   Cardiovascular:  Normal rate.           Faint heart tones, irregular rhythm, normal rate.  Wearing LifeVest.   Pulmonary/Chest: No respiratory distress. He has rhonchi.   Mild rhonchi at the bases   Abdominal: Abdomen is soft. He exhibits distension. There is no abdominal tenderness. There is no rebound and no guarding.   Musculoskeletal:         General: Edema present.      Comments: Mild anasarca with pitting edema to thighs and abdomen, less so than I remember from previous     Neurological: He is alert and oriented to person, place, and time. He has normal strength.   Skin: Skin is warm and dry.   Psychiatric: He has a normal mood and affect. Thought content normal.         ED Course   Procedures  Labs Reviewed   COMPREHENSIVE METABOLIC PANEL - Abnormal       Result Value    Sodium 136      Potassium 3.7      Chloride 101      CO2 24      Glucose 102      Blood Urea Nitrogen 16.2      Creatinine 1.53 (*)     Calcium 9.7      Protein Total 7.9 (*)     Albumin 3.8      Globulin 4.1 (*)     Albumin/Globulin Ratio 0.9 (*)     Bilirubin Total 3.8 (*)      (*)     ALT 15      AST 20      eGFR 50      Anion Gap 11.0      BUN/Creatinine Ratio 11     URINALYSIS, REFLEX TO URINE CULTURE - Abnormal    Color, UA Yellow      Appearance, UA Turbid (*)     Specific Gravity, UA 1.015      pH, UA 5.5      Protein, UA 2+ (*)     Glucose, UA Normal      Ketones, UA Negative      Blood, UA 2+ (*)      Bilirubin, UA Negative      Urobilinogen, UA 2+ (*)     Nitrites, UA Negative      Leukocyte Esterase,  (*)     RBC, UA 6-10 (*)     WBC, UA 51-99 (*)     Bacteria, UA Occasional (*)     Squamous Epithelial Cells, UA Few (*)     Mucous, UA Trace (*)     Hyaline Casts, UA 0-2 (*)    B-TYPE NATRIURETIC PEPTIDE - Abnormal    Natriuretic Peptide 1,459.6 (*)    CBC WITH DIFFERENTIAL - Abnormal    WBC 7.09      RBC 3.72 (*)     Hgb 9.4 (*)     Hct 29.6 (*)     MCV 79.6 (*)     MCH 25.3 (*)     MCHC 31.8 (*)     RDW 22.5 (*)     Platelet 215      MPV 10.8 (*)     Neut % 57.3      Lymph % 20.3      Mono % 9.0      Eos % 12.6      Basophil % 0.4      Imm Grans % 0.4      Neut # 4.06      Lymph # 1.44      Mono # 0.64      Eos # 0.89      Baso # 0.03      Imm Gran # 0.03      NRBC% 0.0     BLOOD SMEAR MICROSCOPIC EXAM (OLG) - Abnormal    Acanthocytes 1+ (*)     Anisocytosis Slight (*)     Yoel Cells Slight (*)     Elliptocytosis 1+ (*)     Hypochromasia Slight (*)     Polychromasia 1+ (*)     Platelets Normal     TROPONIN I - Normal    Troponin-I 0.010     COVID/FLU A&B PCR - Normal    Influenza A PCR Not Detected      Influenza B PCR Not Detected      SARS-CoV-2 PCR Not Detected      Narrative:     The Xpert Xpress SARS-CoV-2/FLU/RSV plus is a rapid, multiplexed real-time PCR test intended for the simultaneous qualitative detection and differentiation of SARS-CoV-2, Influenza A, Influenza B, and respiratory syncytial virus (RSV) viral RNA in either nasopharyngeal swab or nasal swab specimens.         CULTURE, URINE   CBC W/ AUTO DIFFERENTIAL    Narrative:     The following orders were created for panel order CBC auto differential.  Procedure                               Abnormality         Status                     ---------                               -----------         ------                     CBC with Differential[[id number]]       Abnormal            Final result                 Please view results for  these tests on the individual orders.   EXTRA TUBES    Narrative:     The following orders were created for panel order EXTRA TUBES.  Procedure                               Abnormality         Status                     ---------                               -----------         ------                     Light Blue Top Hold[[id number]]                             In process                 Red Top Hold[[id number]]                                    In process                   Please view results for these tests on the individual orders.   LIGHT BLUE TOP HOLD   RED TOP HOLD     EKG Readings: (Independently Interpreted)   Initial Reading: No STEMI. Rhythm: Atrial Fibrillation. Heart Rate: 88.   Atrial fibrillation with PVCs versus a B, low-voltage QRS, possible old anterior infarction, prolonged QT with QTC interval 510 milliseconds.  No significant change from previous.     ECG Results              EKG 12-lead (In process)        Collection Time Result Time QRS Duration [facility] QTC Calculation    03/24/25 13:08:00 03/24/25 13:11:36 100 510                     In process by Interface, Lab In [facility] (03/24/25 13:11:42)                   Narrative:    Test Reason : R06.02,    Vent. Rate :  88 BPM     Atrial Rate : 100 BPM     P-R Int :    ms          QRS Dur : 100 ms      QT Int : 422 ms       P-R-T Axes :      5 107 degrees    QTcB Int : 510 ms    Atrial fibrillation with premature ventricular or aberrantly conducted  complexes  Low voltage QRS  Cannot rule out Anterior infarct ,age undetermined  Prolonged QT  Abnormal ECG  When compared with ECG of 02-Mar-2025 03:20,  Vent. rate has increased by  29 bpm  Questionable change in The axis  Nonspecific T wave abnormality no longer evident in Inferior leads  QT has lengthened    Referred By: AAAREFERRAL SELF           Confirmed By:                                   Imaging Results              X-Ray Chest PA And Lateral (Final result)  Result time 03/24/25 13:39:09       Final result by Danni Johnson MD (03/24/25 13:39:09)                   Impression:      No acute abnormality of the chest.      Electronically signed by: Danni Johnson  Date:    03/24/2025  Time:    13:39               Narrative:    EXAMINATION:  XR CHEST PA AND LATERAL    CLINICAL HISTORY:  Chest Pain;    TECHNIQUE:  PA and lateral chest radiographs    COMPARISON:  Chest x-ray dated 02/26/2025    FINDINGS:  The heart is stable in size.  There is no focal airspace consolidation.  There is no pleural effusion or visible pneumothorax.                                       Medications   ciprofloxacin HCl tablet 500 mg (has no administration in time range)       2:57 PM Feels fine, wants to go home, requesting change of antibiotics.  Counseled, discharge with the following instructions:        Change antibiotics as prescribed for urinary tract infection.      Tests are generally stable, continue usual medications and follow up as previously scheduled.        Medical Decision Making  Patient with multiple chronic comorbidities presents after recent hospital discharge with mild exacerbation of symptoms of dyspnea and mild urinary complaints.  Found generally to be stable but with persistent urinary tract infection, appropriate for outpatient management.    Problems Addressed:  Acute cystitis with hematuria: acute illness or injury  Congestive heart failure, unspecified HF chronicity, unspecified heart failure type: chronic illness or injury    Amount and/or Complexity of Data Reviewed  Labs: ordered. Decision-making details documented in ED Course.  Radiology: ordered. Decision-making details documented in ED Course.  ECG/medicine tests: ordered and independent interpretation performed. Decision-making details documented in ED Course.    Risk  Prescription drug management.  Decision regarding hospitalization.      Additional MDM:   Differential Diagnosis:   CHF exacerbation, urinary tract infection, volume  overload among others                                    Clinical Impression:  Final diagnoses:  [R06.02] SOB (shortness of breath)  [I50.9] Congestive heart failure, unspecified HF chronicity, unspecified heart failure type (Primary)  [N30.01] Acute cystitis with hematuria          ED Disposition Condition    Discharge Stable          ED Prescriptions       Medication Sig Dispense Start Date End Date Auth. Provider    ciprofloxacin HCl (CIPRO) 500 MG tablet Take 1 tablet (500 mg total) by mouth 2 (two) times daily. for 7 days 14 tablet 3/24/2025 3/31/2025 Jerald Hampton MD          Follow-up Information       Follow up With Specialties Details Why Contact Info    [facility] Emergency Medicine  As needed [address] [facility] [address] [phone]    Abiodun Recinos DO Internal Medicine Schedule an appointment as soon as possible for a visit   [facility] [facility] [id number] [phone]                   [1]   Social History  Tobacco Use    Smoking status: Every Day     Current packs/day: 0.50     Average packs/day: 0.8 packs/day for 50.2 years (40.9 ttl pk-yrs)     Types: Cigarettes     Start date: 1975     Passive exposure: Current    Smokeless tobacco: Never    Tobacco comments:     States smoke 2-3 cigarettes per day   Substance Use Topics    Alcohol use: Yes     Alcohol/week: 3.0 standard drinks of alcohol     Types: 3 Cans of beer per week     Comment: socially    Drug use: Not Currently     Frequency: 1.0 times per week     Types: Marijuana     Comment: no use in over 1 year        Jerald Hampton MD  03/24/25 [id number]

## 2025-03-26 LAB
BACTERIA UR CULT: ABNORMAL
OHS QRS DURATION: 100 MS
OHS QTC CALCULATION: 510 MS

## 2025-04-03 ENCOUNTER — TELEPHONE (OUTPATIENT)
Dept: SMOKING CESSATION | Facility: CLINIC | Age: 70
End: 2025-04-03
Payer: MEDICARE

## 2025-04-21 ENCOUNTER — HOSPITAL ENCOUNTER (INPATIENT)
Facility: HOSPITAL | Age: 70
LOS: 28 days | Discharge: HOME HEALTH | DRG: 280 | End: 2025-05-20
Attending: EMERGENCY MEDICINE | Admitting: STUDENT IN AN ORGANIZED HEALTH CARE EDUCATION/TRAINING PROGRAM
Payer: MEDICARE

## 2025-04-21 DIAGNOSIS — I34.0 NONRHEUMATIC MITRAL VALVE REGURGITATION: ICD-10-CM

## 2025-04-21 DIAGNOSIS — I50.41 ACUTE COMBINED SYSTOLIC AND DIASTOLIC CONGESTIVE HEART FAILURE: ICD-10-CM

## 2025-04-21 DIAGNOSIS — R06.00 DYSPNEA: ICD-10-CM

## 2025-04-21 DIAGNOSIS — I50.20 HEART FAILURE WITH REDUCED EJECTION FRACTION: ICD-10-CM

## 2025-04-21 DIAGNOSIS — I50.9 CHRONIC CONGESTIVE HEART FAILURE, UNSPECIFIED HEART FAILURE TYPE: ICD-10-CM

## 2025-04-21 DIAGNOSIS — N17.9 AKI (ACUTE KIDNEY INJURY): ICD-10-CM

## 2025-04-21 DIAGNOSIS — Z91.89 AT RISK FOR LONG QT SYNDROME: ICD-10-CM

## 2025-04-21 DIAGNOSIS — R94.31 QT PROLONGATION: ICD-10-CM

## 2025-04-21 DIAGNOSIS — I50.9 HEART FAILURE: ICD-10-CM

## 2025-04-21 DIAGNOSIS — Z13.9 SCREENING DUE: ICD-10-CM

## 2025-04-21 DIAGNOSIS — D50.9 MICROCYTIC ANEMIA: ICD-10-CM

## 2025-04-21 DIAGNOSIS — E87.20 ACIDEMIA: ICD-10-CM

## 2025-04-21 DIAGNOSIS — F17.200 TOBACCO DEPENDENCY: ICD-10-CM

## 2025-04-21 DIAGNOSIS — R55 SYNCOPE: ICD-10-CM

## 2025-04-21 DIAGNOSIS — R00.1 BRADYCARDIA: ICD-10-CM

## 2025-04-21 DIAGNOSIS — R53.1 WEAKNESS: ICD-10-CM

## 2025-04-21 DIAGNOSIS — R07.9 CHEST PAIN: ICD-10-CM

## 2025-04-21 DIAGNOSIS — I73.9 PVD (PERIPHERAL VASCULAR DISEASE): ICD-10-CM

## 2025-04-21 DIAGNOSIS — E27.40 ADRENAL INSUFFICIENCY: ICD-10-CM

## 2025-04-21 DIAGNOSIS — I50.20 HFREF (HEART FAILURE WITH REDUCED EJECTION FRACTION): Primary | ICD-10-CM

## 2025-04-21 DIAGNOSIS — I50.43 ACUTE ON CHRONIC COMBINED SYSTOLIC AND DIASTOLIC CONGESTIVE HEART FAILURE: ICD-10-CM

## 2025-04-21 LAB
ACCEPTIBLE SP GR UR QL: 1.02 (ref 1–1.03)
ALBUMIN SERPL-MCNC: 3.8 G/DL (ref 3.4–4.8)
ALBUMIN/GLOB SERPL: 1 RATIO (ref 1.1–2)
ALLENS TEST BLOOD GAS (OHS): ABNORMAL
ALP SERPL-CCNC: 191 UNIT/L (ref 40–150)
ALT SERPL-CCNC: 19 UNIT/L (ref 0–55)
AMPHET UR QL SCN: NEGATIVE
ANION GAP SERPL CALC-SCNC: 16 MEQ/L
ANISOCYTOSIS BLD QL SMEAR: ABNORMAL
AST SERPL-CCNC: 35 UNIT/L (ref 11–45)
BACTERIA #/AREA URNS AUTO: ABNORMAL /HPF
BARBITURATE SCN PRESENT UR: NEGATIVE
BASE EXCESS BLD CALC-SCNC: -7.8 MMOL/L
BASOPHILS # BLD AUTO: 0.01 X10(3)/MCL
BASOPHILS NFR BLD AUTO: 0.2 %
BENZODIAZ UR QL SCN: NEGATIVE
BILIRUB SERPL-MCNC: 7.7 MG/DL
BILIRUB UR QL STRIP.AUTO: ABNORMAL
BLOOD GAS SAMPLE TYPE (OHS): ABNORMAL
BNP BLD-MCNC: 2639.5 PG/ML
BUN SERPL-MCNC: 25 MG/DL (ref 8.4–25.7)
BURR CELLS (OLG): ABNORMAL
CALCIUM SERPL-MCNC: 9.9 MG/DL (ref 8.8–10)
CANNABINOIDS UR QL SCN: NEGATIVE
CHLORIDE SERPL-SCNC: 98 MMOL/L (ref 98–107)
CK SERPL-CCNC: <140 U/L (ref 30–200)
CLARITY UR: ABNORMAL
CO2 BLDA-SCNC: 19.5 MMOL/L
CO2 SERPL-SCNC: 14 MMOL/L (ref 23–31)
COCAINE UR QL SCN: NEGATIVE
COHGB MFR BLDA: 2.4 %
COLOR UR AUTO: YELLOW
CREAT SERPL-MCNC: 1.93 MG/DL (ref 0.72–1.25)
CREAT UR-MCNC: 28.8 MG/DL (ref 63–166)
CREAT/UREA NIT SERPL: 13
DRAWN BY BLOOD GAS (OHS): ABNORMAL
EOSINOPHIL # BLD AUTO: 0.11 X10(3)/MCL (ref 0–0.9)
EOSINOPHIL NFR BLD AUTO: 1.9 %
ERYTHROCYTE [DISTWIDTH] IN BLOOD BY AUTOMATED COUNT: 23.7 % (ref 11.5–17)
FENTANYL UR QL SCN: NEGATIVE
GFR SERPLBLD CREATININE-BSD FMLA CKD-EPI: 37 ML/MIN/1.73/M2
GLOBULIN SER-MCNC: 4 GM/DL (ref 2.4–3.5)
GLUCOSE SERPL-MCNC: 90 MG/DL (ref 82–115)
GLUCOSE UR QL STRIP: NORMAL
HCO3 BLDA-SCNC: 18.3 MMOL/L
HCT VFR BLD AUTO: 27.8 % (ref 42–52)
HGB BLD-MCNC: 9 G/DL (ref 14–18)
HGB UR QL STRIP: ABNORMAL
HOLD SPECIMEN: NORMAL
HOLD SPECIMEN: NORMAL
HYALINE CASTS #/AREA URNS LPF: ABNORMAL /LPF
HYPOCHROMIA BLD QL SMEAR: SLIGHT
IMM GRANULOCYTES # BLD AUTO: 0.02 X10(3)/MCL (ref 0–0.04)
IMM GRANULOCYTES NFR BLD AUTO: 0.3 %
KETONES UR QL STRIP: ABNORMAL
LEUKOCYTE ESTERASE UR QL STRIP: 500
LYMPHOCYTES # BLD AUTO: 0.86 X10(3)/MCL (ref 0.6–4.6)
LYMPHOCYTES NFR BLD AUTO: 15 %
MACROCYTES BLD QL SMEAR: ABNORMAL
MAGNESIUM SERPL-MCNC: 2.1 MG/DL (ref 1.6–2.6)
MCH RBC QN AUTO: 24.9 PG (ref 27–31)
MCHC RBC AUTO-ENTMCNC: 32.4 G/DL (ref 33–36)
MCV RBC AUTO: 77 FL (ref 80–94)
MDMA UR QL SCN: NEGATIVE
METHGB MFR BLDA: 0.7 %
MICROCYTES BLD QL SMEAR: ABNORMAL
MONOCYTES # BLD AUTO: 0.72 X10(3)/MCL (ref 0.1–1.3)
MONOCYTES NFR BLD AUTO: 12.5 %
MUCOUS THREADS URNS QL MICRO: ABNORMAL /LPF
NEUTROPHILS # BLD AUTO: 4.03 X10(3)/MCL (ref 2.1–9.2)
NEUTROPHILS NFR BLD AUTO: 70.1 %
NITRITE UR QL STRIP: NEGATIVE
NRBC BLD AUTO-RTO: 0.5 %
O2 HB BLOOD GAS (OHS): 37.6 %
OPIATES UR QL SCN: NEGATIVE
OXYHGB MFR BLDA: 9.4 G/DL
PCO2 BLDA: 39 MMHG (ref 40–50)
PCP UR QL: NEGATIVE
PH BLDA: 7.28 [PH] (ref 7.3–7.4)
PH UR STRIP: 5.5 [PH]
PH UR: 5.5 [PH] (ref 3–11)
PLATELET # BLD AUTO: 203 X10(3)/MCL (ref 130–400)
PLATELET # BLD EST: NORMAL 10*3/UL
PLATELETS.RETICULATED NFR BLD AUTO: 2.6 % (ref 0.9–11.2)
PMV BLD AUTO: 10.4 FL (ref 7.4–10.4)
PO2 BLDA: 31 MMHG (ref 30–40)
POIKILOCYTOSIS BLD QL SMEAR: ABNORMAL
POLYCHROMASIA BLD QL SMEAR: SLIGHT
POTASSIUM SERPL-SCNC: 4.2 MMOL/L (ref 3.5–5.1)
PROT SERPL-MCNC: 7.8 GM/DL (ref 5.8–7.6)
PROT UR QL STRIP: ABNORMAL
RBC # BLD AUTO: 3.61 X10(6)/MCL (ref 4.7–6.1)
RBC #/AREA URNS AUTO: ABNORMAL /HPF
RBC MORPH BLD: ABNORMAL
SAO2 % BLDA: 38.8 %
SODIUM SERPL-SCNC: 128 MMOL/L (ref 136–145)
SP GR UR STRIP.AUTO: 1.01 (ref 1–1.03)
SQUAMOUS #/AREA URNS LPF: ABNORMAL /HPF
TROPONIN I SERPL-MCNC: <0.01 NG/ML (ref 0–0.04)
UROBILINOGEN UR STRIP-ACNC: ABNORMAL
WBC # BLD AUTO: 5.75 X10(3)/MCL (ref 4.5–11.5)
WBC #/AREA URNS AUTO: ABNORMAL /HPF

## 2025-04-21 PROCEDURE — 81001 URINALYSIS AUTO W/SCOPE: CPT | Mod: XB | Performed by: EMERGENCY MEDICINE

## 2025-04-21 PROCEDURE — 87086 URINE CULTURE/COLONY COUNT: CPT | Performed by: EMERGENCY MEDICINE

## 2025-04-21 PROCEDURE — 27000221 HC OXYGEN, UP TO 24 HOURS

## 2025-04-21 PROCEDURE — 83880 ASSAY OF NATRIURETIC PEPTIDE: CPT | Performed by: EMERGENCY MEDICINE

## 2025-04-21 PROCEDURE — 83735 ASSAY OF MAGNESIUM: CPT | Performed by: EMERGENCY MEDICINE

## 2025-04-21 PROCEDURE — 80053 COMPREHEN METABOLIC PANEL: CPT | Performed by: EMERGENCY MEDICINE

## 2025-04-21 PROCEDURE — A4216 STERILE WATER/SALINE, 10 ML: HCPCS

## 2025-04-21 PROCEDURE — 80307 DRUG TEST PRSMV CHEM ANLYZR: CPT | Performed by: EMERGENCY MEDICINE

## 2025-04-21 PROCEDURE — G0378 HOSPITAL OBSERVATION PER HR: HCPCS

## 2025-04-21 PROCEDURE — 82550 ASSAY OF CK (CPK): CPT | Performed by: EMERGENCY MEDICINE

## 2025-04-21 PROCEDURE — 84484 ASSAY OF TROPONIN QUANT: CPT | Performed by: EMERGENCY MEDICINE

## 2025-04-21 PROCEDURE — 25000003 PHARM REV CODE 250

## 2025-04-21 PROCEDURE — 63600175 PHARM REV CODE 636 W HCPCS: Performed by: EMERGENCY MEDICINE

## 2025-04-21 PROCEDURE — 85025 COMPLETE CBC W/AUTO DIFF WBC: CPT | Performed by: EMERGENCY MEDICINE

## 2025-04-21 PROCEDURE — 99285 EMERGENCY DEPT VISIT HI MDM: CPT | Mod: 25

## 2025-04-21 PROCEDURE — 96374 THER/PROPH/DIAG INJ IV PUSH: CPT

## 2025-04-21 PROCEDURE — 99900035 HC TECH TIME PER 15 MIN (STAT)

## 2025-04-21 PROCEDURE — 82803 BLOOD GASES ANY COMBINATION: CPT

## 2025-04-21 PROCEDURE — 94760 N-INVAS EAR/PLS OXIMETRY 1: CPT | Mod: XB

## 2025-04-21 PROCEDURE — 82570 ASSAY OF URINE CREATININE: CPT

## 2025-04-21 PROCEDURE — 93005 ELECTROCARDIOGRAM TRACING: CPT

## 2025-04-21 RX ORDER — GLUCAGON 1 MG
1 KIT INJECTION
Status: DISCONTINUED | OUTPATIENT
Start: 2025-04-21 | End: 2025-05-20 | Stop reason: HOSPADM

## 2025-04-21 RX ORDER — GABAPENTIN 300 MG/1
300 CAPSULE ORAL 3 TIMES DAILY
Status: ON HOLD | COMMUNITY

## 2025-04-21 RX ORDER — LABETALOL HCL 20 MG/4 ML
10 SYRINGE (ML) INTRAVENOUS EVERY 4 HOURS PRN
Status: DISCONTINUED | OUTPATIENT
Start: 2025-04-21 | End: 2025-05-08

## 2025-04-21 RX ORDER — OLOPATADINE HYDROCHLORIDE 1 MG/ML
SOLUTION OPHTHALMIC
Status: ON HOLD | COMMUNITY
Start: 2025-02-07

## 2025-04-21 RX ORDER — CLOPIDOGREL BISULFATE 75 MG/1
75 TABLET ORAL DAILY
Status: DISCONTINUED | OUTPATIENT
Start: 2025-04-22 | End: 2025-05-20 | Stop reason: HOSPADM

## 2025-04-21 RX ORDER — ONDANSETRON 4 MG/1
8 TABLET, ORALLY DISINTEGRATING ORAL EVERY 8 HOURS PRN
Status: DISCONTINUED | OUTPATIENT
Start: 2025-04-21 | End: 2025-04-26

## 2025-04-21 RX ORDER — AMOXICILLIN 250 MG
1 CAPSULE ORAL 2 TIMES DAILY PRN
Status: DISCONTINUED | OUTPATIENT
Start: 2025-04-21 | End: 2025-05-20 | Stop reason: HOSPADM

## 2025-04-21 RX ORDER — HYDRALAZINE HYDROCHLORIDE 20 MG/ML
10 INJECTION INTRAMUSCULAR; INTRAVENOUS EVERY 4 HOURS PRN
Status: DISCONTINUED | OUTPATIENT
Start: 2025-04-21 | End: 2025-05-20 | Stop reason: HOSPADM

## 2025-04-21 RX ORDER — IBUPROFEN 200 MG
24 TABLET ORAL
Status: DISCONTINUED | OUTPATIENT
Start: 2025-04-21 | End: 2025-05-20 | Stop reason: HOSPADM

## 2025-04-21 RX ORDER — ACETAMINOPHEN 325 MG/1
650 TABLET ORAL EVERY 4 HOURS PRN
Status: DISCONTINUED | OUTPATIENT
Start: 2025-04-21 | End: 2025-05-20 | Stop reason: HOSPADM

## 2025-04-21 RX ORDER — ATORVASTATIN CALCIUM 40 MG/1
80 TABLET, FILM COATED ORAL DAILY
Status: DISCONTINUED | OUTPATIENT
Start: 2025-04-22 | End: 2025-05-20 | Stop reason: HOSPADM

## 2025-04-21 RX ORDER — SODIUM CHLORIDE 0.9 % (FLUSH) 0.9 %
10 SYRINGE (ML) INJECTION
Status: DISCONTINUED | OUTPATIENT
Start: 2025-04-21 | End: 2025-05-20 | Stop reason: HOSPADM

## 2025-04-21 RX ORDER — IBUPROFEN 200 MG
16 TABLET ORAL
Status: DISCONTINUED | OUTPATIENT
Start: 2025-04-21 | End: 2025-05-20 | Stop reason: HOSPADM

## 2025-04-21 RX ORDER — SERTRALINE HYDROCHLORIDE 100 MG/1
100 TABLET, FILM COATED ORAL DAILY
Status: ON HOLD | COMMUNITY

## 2025-04-21 RX ORDER — POLYETHYLENE GLYCOL 3350 17 G/17G
17 POWDER, FOR SOLUTION ORAL DAILY PRN
Status: DISCONTINUED | OUTPATIENT
Start: 2025-04-21 | End: 2025-05-20 | Stop reason: HOSPADM

## 2025-04-21 RX ORDER — PANTOPRAZOLE SODIUM 40 MG/1
40 TABLET, DELAYED RELEASE ORAL 2 TIMES DAILY
Status: DISCONTINUED | OUTPATIENT
Start: 2025-04-21 | End: 2025-04-23

## 2025-04-21 RX ORDER — ASPIRIN 81 MG/1
1 TABLET ORAL DAILY
Status: ON HOLD | COMMUNITY
Start: 2025-03-14 | End: 2025-05-19 | Stop reason: HOSPADM

## 2025-04-21 RX ORDER — PROCHLORPERAZINE EDISYLATE 5 MG/ML
5 INJECTION INTRAMUSCULAR; INTRAVENOUS EVERY 6 HOURS PRN
Status: DISCONTINUED | OUTPATIENT
Start: 2025-04-21 | End: 2025-05-20 | Stop reason: HOSPADM

## 2025-04-21 RX ORDER — SIMETHICONE 80 MG
1 TABLET,CHEWABLE ORAL 3 TIMES DAILY PRN
Status: DISCONTINUED | OUTPATIENT
Start: 2025-04-21 | End: 2025-05-20 | Stop reason: HOSPADM

## 2025-04-21 RX ORDER — IBUPROFEN 200 MG
1 TABLET ORAL DAILY PRN
Status: DISCONTINUED | OUTPATIENT
Start: 2025-04-21 | End: 2025-05-20 | Stop reason: HOSPADM

## 2025-04-21 RX ORDER — UREA 40 %
CREAM (GRAM) TOPICAL
Status: ON HOLD | COMMUNITY
Start: 2025-02-13

## 2025-04-21 RX ORDER — ASPIRIN 81 MG/1
81 TABLET ORAL DAILY
Status: DISCONTINUED | OUTPATIENT
Start: 2025-04-22 | End: 2025-05-03

## 2025-04-21 RX ORDER — FUROSEMIDE 10 MG/ML
80 INJECTION INTRAMUSCULAR; INTRAVENOUS
Status: COMPLETED | OUTPATIENT
Start: 2025-04-21 | End: 2025-04-21

## 2025-04-21 RX ORDER — NALOXONE HCL 0.4 MG/ML
0.02 VIAL (ML) INJECTION
Status: DISCONTINUED | OUTPATIENT
Start: 2025-04-21 | End: 2025-05-20 | Stop reason: HOSPADM

## 2025-04-21 RX ORDER — TALC
6 POWDER (GRAM) TOPICAL NIGHTLY PRN
Status: DISCONTINUED | OUTPATIENT
Start: 2025-04-21 | End: 2025-05-20 | Stop reason: HOSPADM

## 2025-04-21 RX ORDER — METOLAZONE 5 MG/1
10 TABLET ORAL DAILY
Status: DISCONTINUED | OUTPATIENT
Start: 2025-04-21 | End: 2025-04-23

## 2025-04-21 RX ORDER — CHOLECALCIFEROL (VITAMIN D3) 25 MCG
1000 TABLET ORAL DAILY
Status: DISCONTINUED | OUTPATIENT
Start: 2025-04-22 | End: 2025-05-20 | Stop reason: HOSPADM

## 2025-04-21 RX ORDER — FOLIC ACID 1 MG/1
1 TABLET ORAL DAILY
Status: DISCONTINUED | OUTPATIENT
Start: 2025-04-22 | End: 2025-05-20 | Stop reason: HOSPADM

## 2025-04-21 RX ORDER — VARENICLINE TARTRATE 1 MG/1
0.5 TABLET, FILM COATED ORAL
Status: ON HOLD | COMMUNITY
Start: 2025-02-13

## 2025-04-21 RX ADMIN — MELATONIN TAB 3 MG 6 MG: 3 TAB at 07:04

## 2025-04-21 RX ADMIN — PANTOPRAZOLE SODIUM 40 MG: 40 TABLET, DELAYED RELEASE ORAL at 08:04

## 2025-04-21 RX ADMIN — METOLAZONE 10 MG: 5 TABLET ORAL at 04:04

## 2025-04-21 RX ADMIN — RIVAROXABAN 20 MG: 10 TABLET, FILM COATED ORAL at 04:04

## 2025-04-21 RX ADMIN — Medication 10 ML: at 07:04

## 2025-04-21 RX ADMIN — ACETAMINOPHEN 650 MG: 325 TABLET, FILM COATED ORAL at 07:04

## 2025-04-21 RX ADMIN — FUROSEMIDE 80 MG: 10 INJECTION, SOLUTION INTRAVENOUS at 01:04

## 2025-04-21 NOTE — H&P
[facility] Internal Medicine Wards History & Physical Note    Resident Team: [facility] Medicine List 3  Attending Physician: Lennox Vinson MD  Resident: Sagar Napoles MD    Subjective:      History of Present Illness:  Ran Reyes Jr. is a 69 y.o.  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 Patient reports he can only ambulate about 20-30 feet before having to stop due to claudication pain which is chronic and unchanged compared to baseline. Since running out of his diuretic medications approximately 5 days ago he has noted progressively worsening lower extremity swelling causing leg heaviness and weakness exacerbating difficulty ambulating.  He also notes acute on chronic cough productive of a brownish sputum without hemoptysis.  Denies fever, chills, sweats.  Denies chest pain.  Endorses shortness of breath at rest and with ambulation but unchanged compared to baseline.  Denies abdominal pain, nausea, vomiting, constipation, diarrhea.  Denies increased or decreased urinary frequency, dysuria, or hematuria.  Sleeps with 2 pillows at night due to baseline orthopnea. Has not had to increase number of pillows or change sleeping habits. Wears 2L NC home oxygen at baseline for hx of COPD. Denies having increased oxygen requirements prior to ED presentation.    ED course:  Vital signs stable.  Oxygenation stable on 2 L nasal cannula.  CBC shows microcytic anemic (HGB 9.0; MCV 77).  CMP shows hyponatremia (Na  128), acidemia (CO2 14), elevated renal indices (BUN 25; creatinine 1.93), elevated alkaline phosphatase (). Troponin WNL.  BNP 26 39.5.  UDS negative.  EKG showed atrial fibrillation with PVCs.  Chest x-ray difficult to interpret due to overlying medical devices however appears to show cardiomegaly with bilateral pulmonary vascular congestion with question of bilateral pleural effusions.  Internal Medicine consulted for admission for acute CHF exacerbation and cardiorenal syndrome.    Past Medical History:  Past Medical History:   Diagnosis Date    A-fib     Anticoagulant long-term use     Anxiety     Aortic aneurysm     Cataract     CHF (congestive heart failure)     Chronic atrial fibrillation     COPD (chronic obstructive pulmonary disease)     Coronary artery disease     Depression     HLD (hyperlipidemia)     Hypertension     Mitral regurgitation     PAD (peripheral artery disease)     Primary open angle glaucoma (POAG)      Past Surgical History:  Past Surgical History:   Procedure Laterality Date    ANGIOGRAM, CORONARY, WITH LEFT HEART CATHETERIZATION N/A 03/26/2024    Procedure: Angiogram, Coronary, with Left Heart Cath;  Surgeon: Adriano Montiel MD;  Location: [facility] CATH LAB;  Service: Cardiology;  Laterality: N/A;    ATHERECTOMY OF PERIPHERAL VESSEL Left 09/12/2022    LEFT SFA ATHERECTOMY, BALLOON ANGIOPLASTY    CATARACT EXTRACTION W/  INTRAOCULAR LENS IMPLANT Left 03/09/2023    Procedure: EXTRACTION, CATARACT, WITH IOL INSERTION;  Surgeon: Georgi Borja MD;  Location: [facility];  Service: Ophthalmology;  Laterality: Left;  19.5  mac    EGD, WITH CLOSED BIOPSY  03/22/2024    Procedure: EGD, WITH CLOSED BIOPSY;  Surgeon: Ronald Calderon MD;  Location: [facility] ENDOSCOPY;  Service: Gastroenterology;;    ESOPHAGOGASTRODUODENOSCOPY N/A 03/22/2024    Procedure: EGD;  Surgeon: Ronald Calderon MD;  Location: [facility] ENDOSCOPY;  Service: Gastroenterology;  Laterality: N/A;    Heart  Stent N/A     > 10yrs. AGO    INCISION AND DRAINAGE N/A 03/18/2024    Procedure: Incision and Drainage;  Surgeon: Fahad Rivas MD;  Location: [facility] OR;  Service: Urology;  Laterality: N/A;  I&D SCROTAL ABSCESS    INSERTION OF STENT INTO PERIPHERAL VESSEL Right 10/17/2022    RIGHT SFA ATHERECTOMY, BALLOON ANGIOPLASTY, STENT; RIGHT ANTERIOR TIBIAL ARTERY ATHERECTOMY, BALLOON ANGIOPLASTY    ORCHIECTOMY Left 03/18/2024    Procedure: ORCHIECTOMY;  Surgeon: Fahad Rivas MD;  Location: [facility] OR;  Service: Urology;  Laterality: Left;     Family History:  Family History   Problem Relation Name Age of Onset    Cancer Mother      Hypertension Mother      Heart failure Father      Stroke Father      Hypertension Father      No Known Problems Sister       Social History:  Social History[1]  Allergies:  Review of patient's allergies indicates:  No Known Allergies  Home Medications:  Prior to Admission medications    Medication Sig Start Date End Date Taking? Authorizing Provider   aspirin (ECOTRIN) 81 MG EC tablet Take 1 tablet by mouth once daily. 3/14/25  Yes Provider, Historical   olopatadine (PATANOL) 0.1 % ophthalmic solution Apply to eye. 2/7/25  Yes Provider, Historical   sacubitriL-valsartan (ENTRESTO) 49-51 mg per tablet Take 1 tablet by mouth once daily. 2/12/25  Yes Provider, Historical   urea (CARMOL) 40 % Crea Apply topically. 2/13/25  Yes Provider, Historical   varenicline tartrate (CHANTIX) 1 mg Tab Take 0.5 mg by mouth. 2/13/25  Yes Provider, Historical   acetaminophen 325 mg Cap Take 650 mg by mouth 2 (two) times daily as needed.    Provider, Historical   ALBUTEROL INHL Inhale 2 puffs into the lungs every 6 (six) hours as needed.    Provider, Historical   albuterol-ipratropium (DUO-NEB) 2.5 mg-0.5 mg/3 mL nebulizer solution Take 3 mLs by nebulization every 6 (six) hours as needed for Wheezing. Rescue 12/9/24   Diana Hassan MD   atorvastatin (LIPITOR) 80 MG tablet Take 1 tablet (80 mg total) by  mouth once daily. 3/4/25 3/4/26  Marino Marquez DO   BREZTRI AEROSPHERE 160-9-4.8 mcg/actuation HFAA Inhale 2 puffs into the lungs 2 (two) times daily. 1/7/25   Provider, Historical   cetirizine (ZYRTEC) 10 MG tablet Take 1 tablet (10 mg total) by mouth once daily. 8/8/24 8/3/25  Abiodun Recinos DO   clopidogreL (PLAVIX) 75 mg tablet Take 1 tablet (75 mg total) by mouth once daily. 3/4/25   Marino Marquez DO   folic acid (FOLVITE) 1 MG tablet Take 1 tablet (1 mg total) by mouth once daily. 4/9/24 4/9/25  Tha Edmond MD   furosemide (LASIX) 40 MG tablet Take 1 tablet (40 mg total) by mouth 2 (two) times a day. 3/4/25   Marino Marquez DO   gabapentin (NEURONTIN) 300 MG capsule Take 300 mg by mouth 3 (three) times daily.    Provider, Historical   metoprolol succinate (TOPROL-XL) 25 MG 24 hr tablet Take 0.5 tablets (12.5 mg total) by mouth once daily. 3/4/25 3/4/26  Marino Marquez DO   nicotine (NICODERM CQ) 14 mg/24 hr Place 1 patch onto the skin daily as needed. 3/4/25   Marino Marquez DO   nicotine (NICODERM CQ) 21 mg/24 hr Place 1 patch onto the skin once daily. 3/20/25   Heaven Page MD   nitrofurantoin, macrocrystal-monohydrate, (MACROBID) 100 MG capsule Take 1 capsule (100 mg total) by mouth 2 (two) times daily. 3/4/25   Marino Marquez DO   pantoprazole (PROTONIX) 40 MG tablet Take 1 tablet (40 mg total) by mouth 2 (two) times a day. 9/23/24   Abiodun Recinos DO   potassium chloride SA (K-DUR,KLOR-CON) 20 MEQ tablet Take 1 tablet (20 mEq total) by mouth 2 (two) times daily. 1/28/25 1/28/26  Vernon Cifuentes MD   rivaroxaban (XARELTO) 20 mg Tab Take 1 tablet (20 mg total) by mouth Daily. 3/4/25   Marino Marquez DO   sertraline (ZOLOFT) 100 MG tablet Take 100 mg by mouth once daily.    Provider, Historical   vitamin D (VITAMIN D3) 1000 units Tab Take 1,000 Units by mouth once daily.    Provider, Historical     Review of Systems:  Review of Systems   Constitutional:  Positive for fatigue. Negative for chills,  diaphoresis and fever.   HENT:  Negative for ear pain, hearing loss, rhinorrhea, sore throat, tinnitus and trouble swallowing.    Eyes:  Negative for pain, redness and visual disturbance.   Respiratory:  Positive for shortness of breath. Negative for cough and chest tightness.    Cardiovascular:  Positive for leg swelling. Negative for chest pain and palpitations.   Gastrointestinal:  Negative for abdominal pain, constipation, diarrhea, nausea and vomiting.   Genitourinary:  Negative for difficulty urinating, dysuria, frequency, hematuria and urgency.   Musculoskeletal:  Negative for arthralgias and myalgias.   Skin:  Negative for rash and wound.   Neurological:  Negative for dizziness, seizures, syncope, weakness, light-headedness, numbness and headaches.   Psychiatric/Behavioral:  Negative for confusion.         Objective:   Last 24 Hour Vital Signs:  BP  Min: 100/64  Max: 114/82  Temp  Av.5 °F (36.4 °C)  Min: 97.5 °F (36.4 °C)  Max: 97.5 °F (36.4 °C)  Pulse  Av.1  Min: 51  Max: 77  Resp  Av.1  Min: 12  Max: 26  SpO2  Av.9 %  Min: 95 %  Max: 100 %  Height  Av' (182.9 cm)  Min: 6' (182.9 cm)  Max: 6' (182.9 cm)  Weight  Av.5 kg (215 lb)  Min: 97.5 kg (215 lb)  Max: 97.5 kg (215 lb)  Body mass index is 29.16 kg/m².  No intake/output data recorded.    Physical Examination:  Physical Exam  Vitals reviewed.   Constitutional:       General: He is not in acute distress.     Appearance: Normal appearance. He is normal weight. He is ill-appearing.   HENT:      Head: Normocephalic and atraumatic.      Right Ear: External ear normal.      Left Ear: External ear normal.   Eyes:      General: No scleral icterus.        Right eye: No discharge.         Left eye: No discharge.      Extraocular Movements: Extraocular movements intact.      Pupils: Pupils are equal, round, and reactive to light.   Cardiovascular:      Rate and Rhythm: Normal rate. Rhythm irregular.      Pulses: Normal pulses.       Heart sounds: Normal heart sounds. No murmur heard.     No friction rub. No gallop.      Comments: Nonpalpable posterior tibial and dorsalis pedis pulses  Pulmonary:      Effort: Pulmonary effort is normal. No respiratory distress.      Breath sounds: No stridor. Wheezing (mild expiratory wheeze bilaterally) present. No rhonchi or rales.   Abdominal:      General: Abdomen is flat. Bowel sounds are normal. There is no distension.      Palpations: Abdomen is soft.      Tenderness: There is no abdominal tenderness. There is no guarding.   Genitourinary:     Comments: Penile shaft and head edematous but without erythema or tenderness to palpation  Musculoskeletal:         General: Swelling present. No tenderness, deformity or signs of injury. Normal range of motion.      Right lower leg: Edema present.      Left lower leg: Edema present.      Comments: 3+ BLE pitting edema   Skin:     General: Skin is warm and dry.      Coloration: Skin is not jaundiced.      Findings: No bruising, erythema or rash.   Neurological:      Mental Status: He is alert.      Comments: AAO to person, place, time, and situation; CN II-XII grossly intact         Laboratory:  Most Recent Data:  CBC:   Lab Results   Component Value Date    WBC 5.75 04/21/2025    HGB 9.0 (L) 04/21/2025    HCT 27.8 (L) 04/21/2025     04/21/2025    MCV 77.0 (L) 04/21/2025    RDW 23.7 (H) 04/21/2025     WBC Differential:   Recent Labs   Lab 04/21/25  1205   WBC 5.75   HGB 9.0*   HCT 27.8*      MCV 77.0*     BMP:   Lab Results   Component Value Date     (L) 04/21/2025    K 4.2 04/21/2025    CL 98 04/21/2025    CO2 14 (L) 04/21/2025    BUN 25.0 04/21/2025    CREATININE 1.93 (H) 04/21/2025    CALCIUM 9.9 04/21/2025    MG 2.10 04/21/2025    PHOS 3.0 03/07/2025     LFTs:   Lab Results   Component Value Date    ALBUMIN 3.8 04/21/2025    BILITOT 7.7 (H) 04/21/2025    AST 35 04/21/2025    ALKPHOS 191 (H) 04/21/2025    ALT 19 04/21/2025     Coags:   Lab  Results   Component Value Date    INR 3.8 (H) 03/07/2025     FLP:   Lab Results   Component Value Date    CHOL 96 08/14/2024    HDL 32 (L) 08/14/2024    TRIG 73 08/14/2024     DM:   Lab Results   Component Value Date    HGBA1C 5.0 03/18/2024    HGBA1C 4.7 11/15/2021    HGBA1C 5.7 10/07/2020    CREATININE 1.93 (H) 04/21/2025     Thyroid:   Lab Results   Component Value Date    TSH 1.097 03/18/2024     Anemia:   Lab Results   Component Value Date    IRON 67 12/16/2024    TIBC 416 12/16/2024    FERRITIN 37.54 12/16/2024    FYOFAPOZ34 1,078 (H) 03/18/2024    FOLATE 10.0 03/18/2024     Cardiac:   Lab Results   Component Value Date    TROPONINI <0.010 04/21/2025    BNP 2,639.5 (H) 04/21/2025     Urinalysis:   Lab Results   Component Value Date    LABURIN >/= 100,000 colonies/ml Escherichia coli ESBL (A) 03/24/2025    COLORU Yellow 04/21/2025    SPECGRAV 1.015 05/15/2024    NITRITE Negative 04/21/2025    KETONESU neg 05/15/2024    UROBILINOGEN 1+ (A) 04/21/2025    WBCUA 51-99 (A) 04/21/2025     Trended Lab Data:  Recent Labs   Lab 04/21/25  1205   WBC 5.75   HGB 9.0*   HCT 27.8*      MCV 77.0*   RDW 23.7*   *   K 4.2   CL 98   CO2 14*   BUN 25.0   CREATININE 1.93*   ALBUMIN 3.8   BILITOT 7.7*   AST 35   ALKPHOS 191*   ALT 19     Trended Cardiac Data:  Recent Labs   Lab 04/21/25  1205   TROPONINI <0.010   BNP 2,639.5*     Radiology:  Imaging Results              X-Ray Chest AP Portable (Final result)  Result time 04/21/25 12:23:21      Final result by Lito Hurst MD (04/21/25 12:23:21)                   Impression:      Changes of pulmonary vascular congestion and cardiac decompensation.    Cardiomegaly      Electronically signed by: Lito Hurst  Date:    04/21/2025  Time:    12:23               Narrative:    EXAMINATION:  XR CHEST AP PORTABLE    CLINICAL HISTORY:  Dyspnea, unspecified    TECHNIQUE:  Single frontal view of the chest was performed.    COMPARISON:  March 24,  2025    FINDINGS:  Examination reveals mediastinal silhouette to be within normal limits cardiac silhouette is enlarged there is increase in interstitial and pulmonary vascular markings indicating the presence of pulmonary vascular congestion and cardiac decompensation.    No focal consolidative changes are otherwise identified                                       Assessment & Plan:     Chronic persistent atrial fibrillation  Nonobstructive coronary artery disease  Nonischemic cardiomyopathy  Heart failure with with reduced ejection fraction (EF 30%); NYHA Class III Stage C  Hx of VT arrest  Pulmonary hypertension, likely combined group II and III  CHF exacerbation  -given IV lasix 80 mg x 1 dose in ED  -strict intake and output  -cardiac diet with 1500 fluid restriction  -daily weights  -holding Entresto in the setting of ELIZABETH  -continue home metoprolol succinate 12.5 mg q.d., atorvastatin 80 mg q.d., aspirin 81 mg q.d., Plavix 75 mg q.d.  -initiated metolazone 10 mg q.d. to augment Lasix and increase diuretic effect  -avoiding Jardiance due to history of renée's gangrene  -we will monitor renal indices in a.m. to evaluate response to diuretics and adjust diuretics therapy accordingly  -consulted case management for home health    Hypervolemic hyponatremia 2/2 volume overload  Chronic moderate hyponatremia  -will hold on administering sodium containing medications for now  -fluid restriction  -treat underlying CHF exacerbation with diuretics  -may require combination treatment with IV diuretics plus hypertonic saline if hyponatremia worsens or patient becomes symptomatic    Acute kidney injury  Normal anion gap acidemia  Penile shaft edema  -baseline renal indices approximately: BUN 25, creatinine 1.1, eGFR > 60  -renal indices elevated compared to baseline  -BUN:Cr ratio < 20; suggestive of intrinsic versus extrinsic disease  -will obtain retroperitoneal US  -will obtain post void residual  -will obtain  FeUrea  -considering ELIZABETH most likely cardiorenal syndrome  -treating underlying cause    Abnormal urinalysis  -patient denies symptoms concerning for underlying UTI  -urinalysis likely dirty catch due to presence of occasional squamous cells in the setting of penile edema  -will hold on initiating abx therapy for now  -urine cultures pending  -consider initiating abx therapy if patient develops symptoms concerning for infection    Peripheral vascular disease s/p stenting to superficial femoral artery and right tibial artery  -CV doppler arterial US (12/06/2024) suggestive of inflow disease on the left and pSFA disease on the right   -not a candidate for Cilostazol due to HFrEF  -continue medical management while inpatient  -will need referral to vascular surgery to consider peripheral angiography +/- intervention upon discharge    Microcytic anemia  -anemia chronic per lab review with hemoglobin remaining stable close to baseline 9  -MCV progressively declining over the past year  -will obtain iron panel, folate, b12  -consider repleting if indiciated  -continue home folic acid 1 mg qd    Tobacco use disorder  Chronic obstructive pulmonary disease  -continue home 2L NC supplementation  -prn nicotine patch ordered prn     Primary open-angle glaucoma  -patient will need repeat referral to [facility] ophthalmology for outpatient follow up as patient has not been seen/evaluated in some time    Code Status: Full code  GI Access: PO  Feeding: Cardiac diet  IV Access: PIV  Fluids: None  Analgesia: Acetaminophen 650 mg q6h prn mild pain  Thromboprophylaxis: Xarelto    Disposition: Admit for ELIZABETH presumed due to cardiorenal syndrome/CHF exacerbation.    Sagar Napoles MD  [facility], [facility]         [1]   Social History  Tobacco Use    Smoking status: Every Day     Current packs/day: 0.50     Average packs/day: 0.8 packs/day for 50.3 years (40.9 ttl pk-yrs)     Types: Cigarettes     Start date: 1975     Passive exposure:  Current    Smokeless tobacco: Never    Tobacco comments:     States smoke 2-3 cigarettes per day   Substance Use Topics    Alcohol use: Yes     Alcohol/week: 3.0 standard drinks of alcohol     Types: 3 Cans of beer per week     Comment: socially    Drug use: Not Currently     Frequency: 1.0 times per week     Types: Marijuana     Comment: no use in over 1 year

## 2025-04-21 NOTE — ED PROVIDER NOTES
Encounter Date: 4/21/2025       History     Chief Complaint   Patient presents with    Weakness     Intermittent weakness (x)2-3 weeks. Also states history of heart failure, pvd, and currently wears a Zoll Defibrillator. Ozw=373 mg/dl per ems. Rates pain 4/10. Vss. 12 lead EKG pending.     Patient is here endorsing generalized weakness, shortness of breath, progressive over several days.  Patient is endorsing lower extremity pain, secondary to peripheral vascular disease (per his report).  Patient acknowledging imperfect compliance with prescribed medications.  He arrives today saturating acceptably on 2 L of oxygen by nasal cannula.  He is wearing a LifeVest, does have bilateral pedal edema.        Review of patient's allergies indicates:  No Known Allergies  Past Medical History:   Diagnosis Date    A-fib     Anticoagulant long-term use     Anxiety     Aortic aneurysm     Cataract     CHF (congestive heart failure)     Chronic atrial fibrillation     COPD (chronic obstructive pulmonary disease)     Coronary artery disease     Depression     HLD (hyperlipidemia)     Hypertension     Mitral regurgitation     PAD (peripheral artery disease)     Primary open angle glaucoma (POAG)      Past Surgical History:   Procedure Laterality Date    ANGIOGRAM, CORONARY, WITH LEFT HEART CATHETERIZATION N/A 03/26/2024    Procedure: Angiogram, Coronary, with Left Heart Cath;  Surgeon: Adriano Montiel MD;  Location: [facility] CATH LAB;  Service: Cardiology;  Laterality: N/A;    ATHERECTOMY OF PERIPHERAL VESSEL Left 09/12/2022    LEFT SFA ATHERECTOMY, BALLOON ANGIOPLASTY    CATARACT EXTRACTION W/  INTRAOCULAR LENS IMPLANT Left 03/09/2023    Procedure: EXTRACTION, CATARACT, WITH IOL INSERTION;  Surgeon: Georgi Borja MD;  Location: [facility];  Service: Ophthalmology;  Laterality: Left;  19.5  mac    EGD, WITH CLOSED BIOPSY  03/22/2024    Procedure: EGD, WITH CLOSED BIOPSY;  Surgeon: Ronald Calderon MD;  Location: [facility]  ENDOSCOPY;  Service: Gastroenterology;;    ESOPHAGOGASTRODUODENOSCOPY N/A 03/22/2024    Procedure: EGD;  Surgeon: Ronald Calderon MD;  Location: [facility] ENDOSCOPY;  Service: Gastroenterology;  Laterality: N/A;    Heart Stent N/A     > 10yrs. AGO    INCISION AND DRAINAGE N/A 03/18/2024    Procedure: Incision and Drainage;  Surgeon: Fahad Rivas MD;  Location: [facility] OR;  Service: Urology;  Laterality: N/A;  I&D SCROTAL ABSCESS    INSERTION OF STENT INTO PERIPHERAL VESSEL Right 10/17/2022    RIGHT SFA ATHERECTOMY, BALLOON ANGIOPLASTY, STENT; RIGHT ANTERIOR TIBIAL ARTERY ATHERECTOMY, BALLOON ANGIOPLASTY    ORCHIECTOMY Left 03/18/2024    Procedure: ORCHIECTOMY;  Surgeon: Fahad Rivas MD;  Location: [facility];  Service: Urology;  Laterality: Left;     Family History   Problem Relation Name Age of Onset    Cancer Mother      Hypertension Mother      Heart failure Father      Stroke Father      Hypertension Father      No Known Problems Sister       Social History[1]  Review of Systems    Physical Exam     Initial Vitals [04/21/25 1137]   BP Pulse Resp Temp SpO2   110/81 73 18 97.5 °F (36.4 °C) 98 %      MAP       --         Physical Exam    Nursing note and vitals reviewed.  Constitutional: He is not diaphoretic. No distress.   Chronically ill appearing ;   HENT:   Head: Normocephalic and atraumatic.   Eyes: EOM are normal. Pupils are equal, round, and reactive to light. Right eye exhibits no discharge. Left eye exhibits no discharge.   Neck: Neck supple. No thyromegaly present. No tracheal deviation present. No JVD present.   Normal range of motion.  Cardiovascular:  Normal rate, regular rhythm, normal heart sounds and intact distal pulses.           No murmur heard.  Pulmonary/Chest: No stridor. No respiratory distress. He has no wheezes. He has no rhonchi. He has rales.   Abdominal: Abdomen is soft. He exhibits no distension. There is no abdominal tenderness. There is no rebound and no guarding.    Musculoskeletal:         General: Edema present. No tenderness. Normal range of motion.      Cervical back: Normal range of motion and neck supple.     Neurological: He is alert and oriented to person, place, and time. He has normal strength. No cranial nerve deficit. GCS score is 15. GCS eye subscore is 4. GCS verbal subscore is 5. GCS motor subscore is 6.   Skin: Skin is warm and dry. Capillary refill takes less than 2 seconds. No rash and no abscess noted. No erythema. No pallor.   Psychiatric: He has a normal mood and affect. His behavior is normal. Judgment and thought content normal.         ED Course   Procedures  Labs Reviewed   COMPREHENSIVE METABOLIC PANEL - Abnormal       Result Value    Sodium 128 (*)     Potassium 4.2      Chloride 98      CO2 14 (*)     Glucose 90      Blood Urea Nitrogen 25.0      Creatinine 1.93 (*)     Calcium 9.9      Protein Total 7.8 (*)     Albumin 3.8      Globulin 4.0 (*)     Albumin/Globulin Ratio 1.0 (*)     Bilirubin Total 7.7 (*)      (*)     ALT 19      AST 35      eGFR 37      Anion Gap 16.0      BUN/Creatinine Ratio 13     B-TYPE NATRIURETIC PEPTIDE - Abnormal    Natriuretic Peptide 2,639.5 (*)    URINALYSIS, REFLEX TO URINE CULTURE - Abnormal    Color, UA Yellow      Appearance, UA Turbid (*)     Specific Gravity, UA 1.015      pH, UA 5.5      Protein, UA Trace (*)     Glucose, UA Normal      Ketones, UA Trace (*)     Blood, UA 1+ (*)     Bilirubin, UA 1+ (*)     Urobilinogen, UA 1+ (*)     Nitrites, UA Negative      Leukocyte Esterase,  (*)     RBC, UA 0-5      WBC, UA 51-99 (*)     Bacteria, UA Trace (*)     Squamous Epithelial Cells, UA Occasional (*)     Mucous, UA Trace (*)     Hyaline Casts, UA 11-20 (*)    CBC WITH DIFFERENTIAL - Abnormal    WBC 5.75      RBC 3.61 (*)     Hgb 9.0 (*)     Hct 27.8 (*)     MCV 77.0 (*)     MCH 24.9 (*)     MCHC 32.4 (*)     RDW 23.7 (*)     Platelet 203      MPV 10.4      IPF 2.6      Neut % 70.1      Lymph % 15.0       Mono % 12.5      Eos % 1.9      Basophil % 0.2      Imm Grans % 0.3      Neut # 4.03      Lymph # 0.86      Mono # 0.72      Eos # 0.11      Baso # 0.01      Imm Gran # 0.02      NRBC% 0.5     BLOOD SMEAR MICROSCOPIC EXAM (OLG) - Abnormal    RBC Morph Abnormal (*)     Anisocytosis 2+ (*)     [place] Cells 3+ (*)     Hypochromasia Slight (*)     Macrocytosis 1+ (*)     Microcytosis 1+ (*)     Poikilocytosis 3+ (*)     Polychromasia Slight (*)     Platelets Normal     TROPONIN I - Normal    Troponin-I <0.010     MAGNESIUM - Normal    Magnesium Level 2.10     CK - Normal    Creatine Kinase <140     DRUG SCREEN, URINE (BEAKER) - Normal    Amphetamines, Urine Negative      Barbiturates, Urine Negative      Benzodiazepine, Urine Negative      Cannabinoids, Urine Negative      Cocaine, Urine Negative      Fentanyl, Urine Negative      MDMA, Urine Negative      Opiates, Urine Negative      Phencyclidine, Urine Negative      pH, Urine 5.5      Specific Gravity, Urine Auto 1.016      Narrative:     Cut off concentrations:    Amphetamines - 1000 ng/ml  Barbiturates - 200 ng/ml  Benzodiazepine - 200 ng/ml  Cannabinoids (THC) - 50 ng/ml  Cocaine - 300 ng/ml  Fentanyl - 1.0 ng/ml  MDMA - 500 ng/ml  Opiates - 300 ng/ml   Phencyclidine (PCP) - 25 ng/ml    Specimen submitted for drug analysis and tested for pH and specific gravity in order to evaluate sample integrity. Suspect tampering if specific gravity is <1.003 and/or pH is not within the range of 4.5 - 8.0  False negatives may result form substances such as bleach added to urine.  False positives may result for the presence of a substance with similar chemical structure to the drug or its metabolite.    This test provides only a PRELIMINARY analytical test result. A more specific alternate chemical method must be used in order to obtain a confirmed analytical result. Gas chromatography/mass spectrometry (GC/MS) is the preferred confirmatory method. Other chemical confirmation  methods are available. Clinical consideration and professional judgement should be applied to any drug of abuse test result, particularly when preliminary positive results are used.    Positive results will be confirmed only at the physicians request. Unconfirmed screening results are to be used only for medical purposes (treatment).        CBC W/ AUTO DIFFERENTIAL    Narrative:     The following orders were created for panel order CBC auto differential.  Procedure                               Abnormality         Status                     ---------                               -----------         ------                     CBC with Differential[[id number]]       Abnormal            Final result                 Please view results for these tests on the individual orders.   EXTRA TUBES    Narrative:     The following orders were created for panel order EXTRA TUBES.  Procedure                               Abnormality         Status                     ---------                               -----------         ------                     Light Blue Top Hold[[id number]]                             Final result               Gold Top Hold[[id number]]                                   Final result                 Please view results for these tests on the individual orders.   LIGHT BLUE TOP HOLD    Extra Tube Hold for add-ons.     GOLD TOP HOLD    Extra Tube Hold for add-ons.          ECG Results              EKG 12-lead (Final result)        Collection Time Result Time QRS Duration OHS QTC Calculation    04/21/25 11:37:12 04/25/25 17:25:32 106 523                     Final result by Interface, Lab In [facility] (04/25/25 17:25:37)                   Narrative:    Test Reason : R53.1,    Vent. Rate :  89 BPM     Atrial Rate :  75 BPM     P-R Int :    ms          QRS Dur : 106 ms      QT Int : 430 ms       P-R-T Axes :     15 -12 degrees    QTcB Int : 523 ms    Atrial fibrillation with premature ventricular or aberrantly  conducted  complexes  Low voltage QRS  Abnormal R wave progression  Abnormal ECG  When compared with ECG of 24-Mar-2025 13:08,  No significant change was found  Confirmed by Dayanna Johnson ([id number]) on 4/25/2025 5:25:27 PM    Referred By:            Confirmed By: Dayanna Johnson                                     EKG 12-lead (Final result)  Result time 04/23/25 09:00:55      Final result by Unknown User (04/23/25 09:00:55)                                      Imaging Results              X-Ray Chest AP Portable (Final result)  Result time 04/21/25 12:23:21      Final result by Lito Hurst MD (04/21/25 12:23:21)                   Impression:      Changes of pulmonary vascular congestion and cardiac decompensation.    Cardiomegaly      Electronically signed by: Lito Hurst  Date:    04/21/2025  Time:    12:23               Narrative:    EXAMINATION:  XR CHEST AP PORTABLE    CLINICAL HISTORY:  Dyspnea, unspecified    TECHNIQUE:  Single frontal view of the chest was performed.    COMPARISON:  March 24, 2025    FINDINGS:  Examination reveals mediastinal silhouette to be within normal limits cardiac silhouette is enlarged there is increase in interstitial and pulmonary vascular markings indicating the presence of pulmonary vascular congestion and cardiac decompensation.    No focal consolidative changes are otherwise identified                                       Medications   atorvastatin tablet 80 mg (80 mg Oral Given 5/15/25 0932)   clopidogreL tablet 75 mg (75 mg Oral Given 5/15/25 0932)   folic acid tablet 1 mg (1 mg Oral Given 5/15/25 0932)   vitamin D 1000 units tablet 1,000 Units (1,000 Units Oral Given 5/15/25 0932)   sodium chloride 0.9% flush 10 mL (10 mLs Intravenous Given 4/21/25 1959)   melatonin tablet 6 mg (6 mg Oral Given 5/14/25 2130)   prochlorperazine injection Soln 5 mg (5 mg Intravenous Given 5/6/25 1915)   polyethylene glycol packet 17 g (has no administration in time range)    senna-docusate 8.6-50 mg per tablet 1 tablet (has no administration in time range)   acetaminophen tablet 650 mg (650 mg Oral Given 5/14/25 2131)   naloxone 0.4 mg/mL injection 0.02 mg (has no administration in time range)   glucose chewable tablet 16 g (has no administration in time range)   glucose chewable tablet 24 g (has no administration in time range)   dextrose 50% injection 12.5 g (has no administration in time range)   dextrose 50% injection 25 g (has no administration in time range)   glucagon (human recombinant) injection 1 mg (has no administration in time range)   hydrALAZINE injection 10 mg (10 mg Intravenous Not Given 5/14/25 1004)   simethicone chewable tablet 80 mg (80 mg Oral Given 5/2/25 2030)   nicotine 21 mg/24 hr 1 patch (has no administration in time range)   rivaroxaban tablet 20 mg (20 mg Oral Given 5/15/25 1722)   hydrocortisone 1 % cream ( Topical (Top) Given 5/11/25 1750)   aluminum-magnesium hydroxide 200-200 mg/5 mL suspension 30 mL (30 mLs Oral Given 5/2/25 2030)   trazodone split tablet 25 mg (25 mg Oral Given 5/4/25 2022)   0.9% NaCl infusion ( Intravenous Stopped 5/4/25 2254)   insulin aspart U-100 injection 0-5 Units (1 Units Subcutaneous Given 5/15/25 2059)   miconazole NITRATE 2 % top powder ( Topical (Top) Not Given 5/15/25 2100)   levalbuterol nebulizer solution 1.25 mg (1.25 mg Nebulization Given by Other 5/15/25 1947)   ipratropium 0.02 % nebulizer solution 0.5 mg (0.5 mg Nebulization Given by Other 5/15/25 1947)   QUEtiapine tablet 100 mg (100 mg Oral Given 5/15/25 2108)   potassium chloride 10 mEq in 100 mL IVPB (10 mEq Intravenous Not Given 5/7/25 1400)   LORazepam injection 1 mg (has no administration in time range)   hydrocortisone sodium succinate injection 50 mg (50 mg Intravenous Given 5/15/25 2108)   guaiFENesin 100 mg/5 ml syrup 200 mg (200 mg Oral Given 5/11/25 0051)   diphenhydrAMINE injection 25 mg (25 mg Intravenous Given 5/14/25 3066)   ferrous sulfate  tablet 1 each (1 each Oral Given 5/14/25 1246)   pantoprazole EC tablet 40 mg (40 mg Oral Given 5/15/25 0932)   furosemide injection 80 mg (80 mg Intravenous Given 4/21/25 1322)   magnesium sulfate 2g in water 50mL IVPB (premix) (0 g Intravenous Stopped 4/23/25 0848)     Followed by   magnesium sulfate 2g in water 50mL IVPB (premix) (0 g Intravenous Stopped 4/23/25 1048)   potassium bicarbonate disintegrating tablet 50 mEq (50 mEq Oral Given 4/23/25 0551)   potassium chloride SA CR tablet 40 mEq (40 mEq Oral Given 4/23/25 1330)   potassium chloride SA CR tablet 40 mEq (40 mEq Oral Given 4/23/25 1638)   mupirocin 2 % ointment ( Nasal Given 4/28/25 0803)   potassium phosphate 30 mmol in D5W 500 mL infusion (0 mmol Intravenous Stopped 4/24/25 1014)   magnesium sulfate 2g in water 50mL IVPB (premix) (0 g Intravenous Stopped 4/25/25 0806)     Followed by   magnesium sulfate 2g in water 50mL IVPB (premix) (0 g Intravenous Stopped 4/25/25 1017)   potassium chloride SA CR tablet 40 mEq (40 mEq Oral Given 4/25/25 0600)   potassium chloride SA CR tablet 60 mEq (60 mEq Oral Given 4/25/25 1316)   loperamide capsule 4 mg (4 mg Oral Given 4/26/25 0023)   hydrOXYzine pamoate capsule 25 mg (25 mg Oral Given 4/26/25 0339)   potassium chloride SA CR tablet 40 mEq (40 mEq Oral Given 4/27/25 0645)   magnesium sulfate in dextrose IVPB (premix) 1 g (0 g Intravenous Stopped 4/27/25 0746)     Followed by   magnesium sulfate 2g in water 50mL IVPB (premix) (0 g Intravenous Stopped 4/27/25 1013)   potassium chloride SA CR tablet 40 mEq (40 mEq Oral Given 4/28/25 0703)   cosyntropin injection 0.25 mg (0.25 mg Intravenous Given 4/30/25 0702)   magnesium sulfate 2g in water 50mL IVPB (premix) (0 g Intravenous Stopped 5/1/25 1114)   potassium chloride SA CR tablet 60 mEq (60 mEq Oral Given 5/1/25 0844)   potassium, sodium phosphates 280-160-250 mg packet 1 packet (1 packet Oral Given 5/1/25 0844)   furosemide injection 20 mg (20 mg Intravenous  Given 5/2/25 1235)   hydrocortisone sodium succinate injection 100 mg (100 mg Intravenous Given 5/2/25 1235)   dexAMETHasone injection 4 mg (4 mg Intravenous Given 5/3/25 0540)   furosemide injection 20 mg (20 mg Intravenous Given 5/3/25 0924)   trazodone split tablet 25 mg (25 mg Oral Given 5/3/25 1155)   bumetanide injection 2 mg (2 mg Intravenous Given 5/3/25 1439)   methocarbamoL tablet 500 mg (500 mg Oral Given 5/3/25 2327)   bumetanide injection 2 mg (2 mg Intravenous Given 5/4/25 0936)   ferric gluconate (Ferrlecit) 125 mg in 0.9% NaCl 110 mL IVPB (0 mg Intravenous Stopped 5/4/25 1053)   bumetanide injection 1 mg (1 mg Intravenous Given 5/4/25 2341)   hydrOXYzine pamoate capsule 25 mg (25 mg Oral Given 5/5/25 0531)   ferric gluconate (Ferrlecit) 125 mg in 0.9% NaCl 110 mL IVPB (0 mg Intravenous Stopped 5/5/25 1205)   bumetanide injection 3 mg (3 mg Intravenous Given 5/5/25 1554)   metOLazone tablet 5 mg (5 mg Oral Given 5/5/25 2003)   LORazepam injection 0.5 mg (0.5 mg Intravenous Given 5/5/25 2135)   LORazepam injection 0.5 mg (0.5 mg Intravenous Given 5/6/25 0318)   potassium chloride 20 mEq in 100 mL IVPB (FOR CENTRAL LINE ADMINISTRATION ONLY) (20 mEq Intravenous New Bag 5/6/25 2006)   potassium chloride 10 mEq in 100 mL IVPB (10 mEq Intravenous New Bag 5/8/25 0438)   acetaZOLAMIDE injection 500 mg (500 mg Intravenous Given 5/8/25 1413)   potassium chloride 20 mEq in 100 mL IVPB (FOR CENTRAL LINE ADMINISTRATION ONLY) (0 mEq Intravenous Stopped 5/8/25 2225)   potassium chloride 10 mEq in 100 mL IVPB (10 mEq Intravenous New Bag 5/9/25 0145)   potassium chloride CR capsule 50 mEq (50 mEq Oral Given 5/8/25 2047)   potassium chloride SA CR tablet 40 mEq (40 mEq Oral Given 5/9/25 0521)   potassium chloride 20 mEq in 100 mL IVPB (FOR CENTRAL LINE ADMINISTRATION ONLY) (0 mEq Intravenous Stopped 5/10/25 0039)   potassium chloride 20 mEq in 100 mL IVPB (FOR CENTRAL LINE ADMINISTRATION ONLY) (0 mEq Intravenous  Stopped 5/10/25 1201)   potassium chloride SA CR tablet 40 mEq (40 mEq Oral Given 5/10/25 2028)   potassium phosphate 15 mmol in D5W 250 mL infusion (0 mmol Intravenous Stopped 5/11/25 0113)   potassium chloride 20 mEq in 100 mL IVPB (FOR CENTRAL LINE ADMINISTRATION ONLY) (20 mEq Intravenous New Bag 5/11/25 1716)   potassium chloride SA CR tablet 40 mEq (40 mEq Oral Given 5/12/25 0533)   potassium chloride SA CR tablet 40 mEq (40 mEq Oral Given 5/12/25 1445)   potassium chloride SA CR tablet 40 mEq (40 mEq Oral Given 5/12/25 1700)   sodium chloride 0.9% bolus 250 mL 250 mL (0 mLs Intravenous Stopped 5/13/25 1419)   potassium chloride SA CR tablet 40 mEq (40 mEq Oral Given 5/14/25 2134)   magnesium oxide tablet 400 mg (400 mg Oral Given 5/14/25 2131)   loperamide capsule 4 mg (4 mg Oral Given 5/14/25 2134)   magnesium sulfate 2g in water 50mL IVPB (premix) (0 g Intravenous Stopped 5/15/25 1133)   potassium bicarbonate disintegrating tablet 25 mEq (25 mEq Oral Given 5/15/25 0932)   bumetanide tablet 1 mg (1 mg Oral Given 5/15/25 1206)     Medical Decision Making  Amount and/or Complexity of Data Reviewed  Labs: ordered.  Radiology: ordered.    Risk  Prescription drug management.  Decision regarding hospitalization.               ED Course as of 05/15/25 2147   Mon Apr 21, 2025   1247 Hemogram with continued anemia; [CT]   1248 BNP up trending in comparison to baseline; [CT]   1248 Negative troponin; [CT]   1248 Chemistries with modest hyponatremia, moderately elevated anion gap, somewhat discussed serum bicarbonate creatinine 1.93 (compatible with prerenal azotemia); [CT]   1249 Chest x-ray compatible with pulmonary vascular congestion, cardiomegaly, worsened in comparison to prior; [CT]      ED Course User Index  [CT] Jamin Mandujano MD                           Clinical Impression:  Final diagnoses:  [R53.1] Weakness  [R06.00] Dyspnea  [Z13.9] Screening due          ED Disposition Condition    Observation                    [1]   Social History  Tobacco Use    Smoking status: Every Day     Current packs/day: 0.50     Average packs/day: 0.8 packs/day for 50.4 years (40.9 ttl pk-yrs)     Types: Cigarettes     Start date: 1975     Passive exposure: Current    Smokeless tobacco: Never    Tobacco comments:     States smoke 2-3 cigarettes per day   Substance Use Topics    Alcohol use: Yes     Alcohol/week: 3.0 standard drinks of alcohol     Types: 3 Cans of beer per week     Comment: socially    Drug use: Not Currently     Frequency: 1.0 times per week     Types: Marijuana     Comment: no use in over 1 year        Jamin Mandujano MD  05/15/25 2446

## 2025-04-21 NOTE — PLAN OF CARE
Problem: Skin Injury Risk Increased  Goal: Skin Health and Integrity  Outcome: Progressing     Problem: Adult Inpatient Plan of Care  Goal: Plan of Care Review  Outcome: Progressing  Goal: Patient-Specific Goal (Individualized)  Outcome: Progressing  Goal: Absence of Hospital-Acquired Illness or Injury  Outcome: Progressing  Goal: Optimal Comfort and Wellbeing  Outcome: Progressing  Goal: Readiness for Transition of Care  Outcome: Progressing     Problem: COPD (Chronic Obstructive Pulmonary Disease)  Goal: Optimal Chronic Illness Coping  Outcome: Progressing  Goal: Optimal Level of Functional [place]  Outcome: Progressing  Goal: Absence of Infection Signs and Symptoms  Outcome: Progressing  Goal: Improved Oral Intake  Outcome: Progressing     Problem: Heart Failure  Goal: Optimal Coping  Outcome: Progressing  Goal: Optimal Cardiac Output  Outcome: Progressing  Goal: Stable Heart Rate and Rhythm  Outcome: Progressing  Goal: Optimal Functional Ability  Outcome: Progressing  Goal: Fluid and Electrolyte Balance  Outcome: Progressing  Goal: Improved Oral Intake  Outcome: Progressing  Goal: Effective Breathing Pattern During Sleep  Outcome: Progressing     Problem: Fall Injury Risk  Goal: Absence of Fall and Fall-Related Injury  Outcome: Progressing     Problem: Wound  Goal: Optimal Coping  Outcome: Progressing  Goal: Optimal Functional Ability  Outcome: Progressing  Goal: Absence of Infection Signs and Symptoms  Outcome: Progressing  Goal: Improved Oral Intake  Outcome: Progressing  Goal: Optimal Pain Control and Function  Outcome: Progressing  Goal: Skin Health and Integrity  Outcome: Progressing  Goal: Optimal Wound Healing  Outcome: Progressing      Purple DH (Discharge Huddle; Vulnerable Patient)

## 2025-04-22 PROBLEM — E44.0 MODERATE MALNUTRITION: Status: ACTIVE | Noted: 2025-04-22

## 2025-04-22 LAB
ALBUMIN SERPL-MCNC: 3.5 G/DL (ref 3.4–4.8)
ALBUMIN/GLOB SERPL: 0.9 RATIO (ref 1.1–2)
ALP SERPL-CCNC: 175 UNIT/L (ref 40–150)
ALT SERPL-CCNC: 18 UNIT/L (ref 0–55)
ANION GAP SERPL CALC-SCNC: 16 MEQ/L
AST SERPL-CCNC: 32 UNIT/L (ref 11–45)
BASOPHILS # BLD AUTO: 0.01 X10(3)/MCL
BASOPHILS NFR BLD AUTO: 0.2 %
BILIRUB SERPL-MCNC: 7.2 MG/DL
BUN SERPL-MCNC: 23.5 MG/DL (ref 8.4–25.7)
CALCIUM SERPL-MCNC: 9.5 MG/DL (ref 8.8–10)
CHLORIDE SERPL-SCNC: 99 MMOL/L (ref 98–107)
CO2 SERPL-SCNC: 18 MMOL/L (ref 23–31)
CREAT SERPL-MCNC: 1.67 MG/DL (ref 0.72–1.25)
CREAT/UREA NIT SERPL: 14
EOSINOPHIL # BLD AUTO: 0.14 X10(3)/MCL (ref 0–0.9)
EOSINOPHIL NFR BLD AUTO: 2.4 %
ERYTHROCYTE [DISTWIDTH] IN BLOOD BY AUTOMATED COUNT: 23.5 % (ref 11.5–17)
FERRITIN SERPL-MCNC: 51.82 NG/ML (ref 21.81–274.66)
FOLATE SERPL-MCNC: 18.5 NG/ML (ref 7–31.4)
GFR SERPLBLD CREATININE-BSD FMLA CKD-EPI: 45 ML/MIN/1.73/M2
GLOBULIN SER-MCNC: 3.8 GM/DL (ref 2.4–3.5)
GLUCOSE SERPL-MCNC: 82 MG/DL (ref 82–115)
HCT VFR BLD AUTO: 26.1 % (ref 42–52)
HGB BLD-MCNC: 8.6 G/DL (ref 14–18)
HOLD SPECIMEN: NORMAL
IMM GRANULOCYTES # BLD AUTO: 0.02 X10(3)/MCL (ref 0–0.04)
IMM GRANULOCYTES NFR BLD AUTO: 0.3 %
IRON SATN MFR SERPL: 7 % (ref 20–50)
IRON SERPL-MCNC: 22 UG/DL (ref 65–175)
LYMPHOCYTES # BLD AUTO: 0.83 X10(3)/MCL (ref 0.6–4.6)
LYMPHOCYTES NFR BLD AUTO: 14.3 %
MAGNESIUM SERPL-MCNC: 1.9 MG/DL (ref 1.6–2.6)
MCH RBC QN AUTO: 25.1 PG (ref 27–31)
MCHC RBC AUTO-ENTMCNC: 33 G/DL (ref 33–36)
MCV RBC AUTO: 76.3 FL (ref 80–94)
MONOCYTES # BLD AUTO: 0.96 X10(3)/MCL (ref 0.1–1.3)
MONOCYTES NFR BLD AUTO: 16.5 %
NEUTROPHILS # BLD AUTO: 3.86 X10(3)/MCL (ref 2.1–9.2)
NEUTROPHILS NFR BLD AUTO: 66.3 %
NRBC BLD AUTO-RTO: 0 %
PHOSPHATE SERPL-MCNC: 2.9 MG/DL (ref 2.3–4.7)
PLATELET # BLD AUTO: 195 X10(3)/MCL (ref 130–400)
PLATELETS.RETICULATED NFR BLD AUTO: 2.6 % (ref 0.9–11.2)
PMV BLD AUTO: 10.5 FL (ref 7.4–10.4)
POTASSIUM SERPL-SCNC: 3.6 MMOL/L (ref 3.5–5.1)
PROT SERPL-MCNC: 7.3 GM/DL (ref 5.8–7.6)
RBC # BLD AUTO: 3.42 X10(6)/MCL (ref 4.7–6.1)
SODIUM SERPL-SCNC: 133 MMOL/L (ref 136–145)
TIBC SERPL-MCNC: 275 UG/DL (ref 60–240)
TIBC SERPL-MCNC: 297 UG/DL (ref 250–450)
TRANSFERRIN SERPL-MCNC: 284 MG/DL (ref 163–344)
VIT B12 SERPL-MCNC: >2000 PG/ML (ref 213–816)
WBC # BLD AUTO: 5.82 X10(3)/MCL (ref 4.5–11.5)

## 2025-04-22 PROCEDURE — 36415 COLL VENOUS BLD VENIPUNCTURE: CPT | Performed by: STUDENT IN AN ORGANIZED HEALTH CARE EDUCATION/TRAINING PROGRAM

## 2025-04-22 PROCEDURE — 82728 ASSAY OF FERRITIN: CPT

## 2025-04-22 PROCEDURE — 84100 ASSAY OF PHOSPHORUS: CPT

## 2025-04-22 PROCEDURE — 21400001 HC TELEMETRY ROOM

## 2025-04-22 PROCEDURE — 82746 ASSAY OF FOLIC ACID SERUM: CPT

## 2025-04-22 PROCEDURE — 85025 COMPLETE CBC W/AUTO DIFF WBC: CPT

## 2025-04-22 PROCEDURE — 25000003 PHARM REV CODE 250

## 2025-04-22 PROCEDURE — 80053 COMPREHEN METABOLIC PANEL: CPT

## 2025-04-22 PROCEDURE — 83735 ASSAY OF MAGNESIUM: CPT

## 2025-04-22 PROCEDURE — 27000221 HC OXYGEN, UP TO 24 HOURS

## 2025-04-22 PROCEDURE — 63600175 PHARM REV CODE 636 W HCPCS

## 2025-04-22 PROCEDURE — 94761 N-INVAS EAR/PLS OXIMETRY MLT: CPT

## 2025-04-22 PROCEDURE — 82607 VITAMIN B-12: CPT

## 2025-04-22 PROCEDURE — 36415 COLL VENOUS BLD VENIPUNCTURE: CPT

## 2025-04-22 PROCEDURE — 83550 IRON BINDING TEST: CPT

## 2025-04-22 RX ORDER — FUROSEMIDE 10 MG/ML
80 INJECTION INTRAMUSCULAR; INTRAVENOUS DAILY
Status: DISCONTINUED | OUTPATIENT
Start: 2025-04-22 | End: 2025-04-23

## 2025-04-22 RX ADMIN — FUROSEMIDE 80 MG: 10 INJECTION, SOLUTION INTRAVENOUS at 09:04

## 2025-04-22 RX ADMIN — ASPIRIN 81 MG: 81 TABLET, COATED ORAL at 09:04

## 2025-04-22 RX ADMIN — CLOPIDOGREL BISULFATE 75 MG: 75 TABLET, FILM COATED ORAL at 09:04

## 2025-04-22 RX ADMIN — SACUBITRIL AND VALSARTAN 1 TABLET: 24; 26 TABLET, FILM COATED ORAL at 09:04

## 2025-04-22 RX ADMIN — PANTOPRAZOLE SODIUM 40 MG: 40 TABLET, DELAYED RELEASE ORAL at 08:04

## 2025-04-22 RX ADMIN — RIVAROXABAN 15 MG: 15 TABLET, FILM COATED ORAL at 06:04

## 2025-04-22 RX ADMIN — FOLIC ACID 1 MG: 1 TABLET ORAL at 09:04

## 2025-04-22 RX ADMIN — METOLAZONE 10 MG: 5 TABLET ORAL at 09:04

## 2025-04-22 RX ADMIN — Medication 1000 UNITS: at 09:04

## 2025-04-22 RX ADMIN — PANTOPRAZOLE SODIUM 40 MG: 40 TABLET, DELAYED RELEASE ORAL at 09:04

## 2025-04-22 RX ADMIN — ATORVASTATIN CALCIUM 80 MG: 40 TABLET, FILM COATED ORAL at 09:04

## 2025-04-22 RX ADMIN — METOPROLOL SUCCINATE 12.5 MG: 25 TABLET, EXTENDED RELEASE ORAL at 09:04

## 2025-04-22 NOTE — PLAN OF CARE
Problem: Skin Injury Risk Increased  Goal: Skin Health and Integrity  Outcome: Progressing     Problem: Adult Inpatient Plan of Care  Goal: Plan of Care Review  Outcome: Progressing  Goal: Patient-Specific Goal (Individualized)  Outcome: Progressing  Goal: Absence of Hospital-Acquired Illness or Injury  Outcome: Progressing  Goal: Optimal Comfort and Wellbeing  Outcome: Progressing  Goal: Readiness for Transition of Care  Outcome: Progressing     Problem: COPD (Chronic Obstructive Pulmonary Disease)  Goal: Optimal Chronic Illness Coping  Outcome: Progressing  Goal: Optimal Level of Functional [place]  Outcome: Progressing  Goal: Absence of Infection Signs and Symptoms  Outcome: Progressing  Goal: Improved Oral Intake  Outcome: Progressing     Problem: Heart Failure  Goal: Optimal Coping  Outcome: Progressing  Goal: Optimal Cardiac Output  Outcome: Progressing  Goal: Stable Heart Rate and Rhythm  Outcome: Progressing  Goal: Optimal Functional Ability  Outcome: Progressing  Goal: Fluid and Electrolyte Balance  Outcome: Progressing  Goal: Improved Oral Intake  Outcome: Progressing  Goal: Effective Breathing Pattern During Sleep  Outcome: Progressing     Problem: Fall Injury Risk  Goal: Absence of Fall and Fall-Related Injury  Outcome: Progressing     Problem: Wound  Goal: Optimal Coping  Outcome: Progressing  Goal: Optimal Functional Ability  Outcome: Progressing  Goal: Absence of Infection Signs and Symptoms  Outcome: Progressing  Goal: Improved Oral Intake  Outcome: Progressing  Goal: Optimal Pain Control and Function  Outcome: Progressing  Goal: Skin Health and Integrity  Outcome: Progressing  Goal: Optimal Wound Healing  Outcome: Progressing     Problem: Acute Kidney Injury/Impairment  Goal: Fluid and Electrolyte Balance  Outcome: Progressing  Goal: Improved Oral Intake  Outcome: Progressing  Goal: Effective Renal Function  Outcome: Progressing     Problem: Dysrhythmia  Goal: Normalized Cardiac  Rhythm  Outcome: Progressing

## 2025-04-22 NOTE — PROGRESS NOTES
[facility] Wards   Progress Note     Resident Team: [facility] Medicine List 3  Attending Physician: Lennox Vinson MD  Resident: Yesika  Intern: Lico    Subjective:      Brief HPI:  Ran Reyes Jr. is a 69 y.o.  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 Patient reports he can only ambulate about 20-30 feet before having to stop due to claudication pain which is chronic and unchanged compared to baseline. Since running out of his diuretic medications approximately 5 days ago he has noted progressively worsening lower extremity swelling causing leg heaviness and weakness exacerbating difficulty ambulating.  He also notes acute on chronic cough productive of a brownish sputum without hemoptysis.  Denies fever, chills, sweats.  Denies chest pain.  Endorses shortness of breath at rest and with ambulation but unchanged compared to baseline.  Denies abdominal pain, nausea, vomiting, constipation, diarrhea.  Denies increased or decreased urinary frequency, dysuria, or hematuria.  Sleeps with 2 pillows at night due to baseline orthopnea. Has not had to increase number of pillows or change sleeping habits. Wears 2L NC home oxygen at baseline for hx of COPD. Denies having increased oxygen requirements prior to ED presentation.     ED course:  Vital signs stable.  Oxygenation stable on 2 L nasal cannula.  CBC shows microcytic anemic (HGB 9.0; MCV 77).  CMP shows hyponatremia (Na 128), acidemia (CO2 14),  elevated renal indices (BUN 25; creatinine 1.93), elevated alkaline phosphatase (). Troponin WNL.  BNP 26 39.5.  UDS negative.  EKG showed atrial fibrillation with PVCs.  Chest x-ray difficult to interpret due to overlying medical devices however appears to show cardiomegaly with bilateral pulmonary vascular congestion with question of bilateral pleural effusions.  Internal Medicine consulted for admission for acute CHF exacerbation and cardiorenal syndrome.    Interval History:     Patient reports feeling better this AM with improve breathing and mildly improved LE swelling. Patient drowsy on exam, but able to verbalize that he feels better. 3L UO overnight, renal indices improved Cr 1.6, Na 133 (improved).      Review of Systems:  Constitutional:  Positive for fatigue. Negative for chills, diaphoresis and fever.   HENT:  Negative for ear pain, hearing loss, rhinorrhea, sore throat, tinnitus and trouble swallowing.    Eyes:  Negative for pain, redness and visual disturbance.   Respiratory:  Positive for shortness of breath. Negative for cough and chest tightness.    Cardiovascular:  Positive for leg swelling. Negative for chest pain and palpitations.   Gastrointestinal:  Negative for abdominal pain, constipation, diarrhea, nausea and vomiting.   Genitourinary:  Negative for difficulty urinating, dysuria, frequency, hematuria and urgency.   Musculoskeletal:  Negative for arthralgias and myalgias.   Skin:  Negative for rash and wound.   Neurological:  Negative for dizziness, seizures, syncope, weakness, light-headedness, numbness and headaches.   Psychiatric/Behavioral:  Negative for confusion.             Objective:     Vital Signs (Most Recent):  Temp: 97.4 °F (36.3 °C) (04/22/25 0455)  Pulse: 71 (04/22/25 0744)  Resp: 18 (04/22/25 0455)  BP: 105/67 (04/22/25 0744)  SpO2: 100 % (04/22/25 0744) Vital Signs (24h Range):  Temp:  [97.4 °F (36.3 °C)-97.5 °F (36.4 °C)] 97.4 °F (36.3 °C)  Pulse:  [51-77] 71  Resp:   [12-26] 18  SpO2:  [95 %-100 %] 100 %  BP: ()/(64-84) 105/67       Physical Examination:  General: Patient resting comfortably, in no acute distress; 100% on 2L NC  Eye: pupils equal, EOMI, clear conjunctiva, eyelids normal  HENT: Head-normocephalic and atraumatic  Neck: full range of motion, no thyromegaly or lymphadenopathy, trachea midline, supple  Respiratory: bilateral diffuse crackles  Cardiovascular: regular rate and rhythm without murmurs.  No gallops or rubs, Mild JVD.  Capillary refill within normal limits. LE B/L 3+ pitting edema  Gastrointestinal: soft, non-tender, mildly distended with normal bowel sounds, without masses to palpation  Genitourinary: no CVA tenderness to palpation  Musculoskeletal: full range of motion of all extremities/spine without limitation or discomfort  Integumentary: no rashes or skin lesions present  Neurologic: no signs of peripheral neurological deficit, motor/sensory function intact  Psychiatric:  alert and oriented, cognitive function intact, cooperative with exam      Laboratory:  Trended Lab Data:  Recent Labs   Lab 04/21/25  1205 04/22/25  0336   WBC 5.75 5.82   HGB 9.0* 8.6*   HCT 27.8* 26.1*    195   MCV 77.0* 76.3*   RDW 23.7* 23.5*   * 133*   K 4.2 3.6   CL 98 99   CO2 14* 18*   BUN 25.0 23.5   CREATININE 1.93* 1.67*   ALBUMIN 3.8 3.5   BILITOT 7.7* 7.2*   AST 35 32   ALKPHOS 191* 175*   ALT 19 18       Microbiology Data Reviewed:   Pertinent Findings:      Other Results:  EKG (my interpretation): .    Radiology:   No new imaging.    Antibiotics and Day Number of Therapy:        Intake/Output Summary (Last 24 hours) at 4/22/2025 0751  Last data filed at 4/22/2025 0614  Gross per 24 hour   Intake --   Output 3805 ml   Net -3805 ml         Assessment & Plan:   Chronic persistent atrial fibrillation  Nonobstructive coronary artery disease  Nonischemic cardiomyopathy  Heart failure with with reduced ejection fraction (EF 30%); NYHA Class III Stage C  Hx  of VT arrest  Pulmonary hypertension, likely combined group II and III  CHF exacerbation  -given IV lasix 80 mg x 1 dose in ED  -strict intake and output  -cardiac diet with 1500 fluid restriction  -daily weights  -restarted entresto this AM, LD  -continue home metoprolol succinate 12.5 mg q.d., atorvastatin 80 mg q.d., aspirin 81 mg q.d., Plavix 75 mg q.d.  - continue metolazone 10 mg q.d. to augment Lasix and increase diuretic effect  - will schedule lasix IV 80 mg daily  -avoiding Jardiance due to history of renée's gangrene  -we will monitor renal indices in a.m. to evaluate response to diuretics and adjust diuretics therapy accordingly  -consulted case management for home health  - consulted PT/OT for ambulation  - 3.5L UO since yesterday     Hypervolemic hyponatremia 2/2 volume overload  Chronic moderate hyponatremia  - improving with diuresis  - will hold on administering sodium containing medications for now  -fluid restriction  -treat underlying CHF exacerbation with diuretics  - may require combination treatment with IV diuretics plus hypertonic saline if hyponatremia worsens or patient becomes symptomatic     Acute kidney injury  Normal anion gap acidemia  Penile shaft edema  -baseline renal indices approximately: BUN 25, creatinine 1.1, eGFR > 60  -renal indices elevated compared to baseline  -BUN:Cr ratio < 20; suggestive of intrinsic versus extrinsic disease  -will obtain retroperitoneal US  -will obtain post void residual  -will obtain FeUrea  -considering ELIZABETH most likely cardiorenal syndrome  -treating underlying cause     Abnormal urinalysis  -patient denies symptoms concerning for underlying UTI  -urinalysis likely dirty catch due to presence of occasional squamous cells in the setting of penile edema  -will hold on initiating abx therapy for now  -urine cultures pending  -consider initiating abx therapy if patient develops symptoms concerning for infection     Peripheral vascular disease s/p  stenting to superficial femoral artery and right tibial artery  -CV doppler arterial US (12/06/2024) suggestive of inflow disease on the left and pSFA disease on the right   -not a candidate for Cilostazol due to HFrEF  -continue medical management while inpatient  -will need referral to vascular surgery to consider peripheral angiography +/- intervention upon discharge     Microcytic anemia  -anemia chronic per lab review with hemoglobin remaining stable close to baseline 9  -MCV progressively declining over the past year  -will obtain iron panel, folate, b12  -consider repleting if indiciated  -continue home folic acid 1 mg qd     Tobacco use disorder  Chronic obstructive pulmonary disease  -continue home 2L NC supplementation  -prn nicotine patch ordered prn      Primary open-angle glaucoma  -patient will need repeat referral to [facility] ophthalmology for outpatient follow up as patient has not been seen/evaluated in some time    CODE STATUS: Full Code  Access: Peripheral  Antibiotics: None  Diet: Cardiac  DVT Prophylaxis: xarelto  GI Prophylaxis: none needed  Fluids: none  ml/hr.     Disposition: Patient admitted and being treated for HF exacerbation; acute on chronic; remains hypervolemic, will increase diuresis and increase GDMT as tolerated    Larry Barfield MD  [facility]  HO-1

## 2025-04-22 NOTE — PT/OT/SLP PROGRESS
Physical Therapy    Missed Treatment Session - patient unwilling to participate note - 1534 - 04/22/2025    Patient Name:  Ran Reyes Jr.   MRN:  [id number]      -patient not seen at this time secondary to patient unwilling to participate  -patient sitting edge of bed eating  -patient agreed to participate in a.m.  -patients nurse Mariam notified  -will follow-up as patient is appropriate/available/agreeable to participate and as therapists' schedule allows.

## 2025-04-22 NOTE — PLAN OF CARE
Problem: Skin Injury Risk Increased  Goal: Skin Health and Integrity  Outcome: Progressing     Problem: Adult Inpatient Plan of Care  Goal: Plan of Care Review  Outcome: Progressing  Goal: Patient-Specific Goal (Individualized)  Outcome: Progressing  Goal: Absence of Hospital-Acquired Illness or Injury  Outcome: Progressing  Goal: Optimal Comfort and Wellbeing  Outcome: Progressing  Goal: Readiness for Transition of Care  Outcome: Progressing     Problem: COPD (Chronic Obstructive Pulmonary Disease)  Goal: Optimal Chronic Illness Coping  Outcome: Progressing  Goal: Optimal Level of Functional [place]  Outcome: Progressing  Goal: Absence of Infection Signs and Symptoms  Outcome: Progressing  Goal: Improved Oral Intake  Outcome: Progressing     Problem: Heart Failure  Goal: Optimal Coping  Outcome: Progressing  Goal: Optimal Cardiac Output  Outcome: Progressing  Goal: Stable Heart Rate and Rhythm  Outcome: Progressing  Goal: Optimal Functional Ability  Outcome: Progressing  Goal: Fluid and Electrolyte Balance  Outcome: Progressing  Goal: Improved Oral Intake  Outcome: Progressing  Goal: Effective Breathing Pattern During Sleep  Outcome: Progressing     Problem: Wound  Goal: Optimal Coping  Outcome: Progressing  Goal: Optimal Functional Ability  Outcome: Progressing  Goal: Absence of Infection Signs and Symptoms  Outcome: Progressing  Goal: Improved Oral Intake  Outcome: Progressing  Goal: Optimal Pain Control and Function  Outcome: Progressing  Goal: Skin Health and Integrity  Outcome: Progressing  Goal: Optimal Wound Healing  Outcome: Progressing     Problem: Acute Kidney Injury/Impairment  Goal: Fluid and Electrolyte Balance  Outcome: Progressing  Goal: Improved Oral Intake  Outcome: Progressing  Goal: Effective Renal Function  Outcome: Progressing     Problem: Dysrhythmia  Goal: Normalized Cardiac Rhythm  Outcome: Progressing

## 2025-04-22 NOTE — PLAN OF CARE
04/22/25 0838   Discharge Assessment   Confirmed/corrected address, phone number and insurance Yes   Confirmed Demographics Correct on Facesheet   Source of Information patient;health record   When was your last doctors appointment?   (Abiodun Jorje)   Reason For Admission Weakness   People in Home alone   Facility Arrived From: Home   Do you expect to return to your current living situation? Yes   Do you have help at home or someone to help you manage your care at home? Yes   Who are your caregiver(s) and their phone number(s)? Yesenia Reyes (Sister)  [phone]; Sister, Nina Olsen ([phone])   Prior to hospitilization cognitive status: Alert/Oriented   Current cognitive status: Alert/Oriented   Walking or Climbing Stairs Difficulty yes   Walking or Climbing Stairs ambulation difficulty, requires equipment   Mobility Management Rollator   Dressing/Bathing Difficulty no   Home Accessibility wheelchair accessible   Home Layout Able to live on 1st floor   Equipment Currently Used at Home rollator;oxygen  (LifeVest)   Readmission within 30 days? No   Patient currently being followed by outpatient case management? No   Do you currently have service(s) that help you manage your care at home? Yes   Name and Contact number of agency [facility]   Is the pt/caregiver preference to resume services with current agency Yes   Do you take prescription medications? Yes  ([facility])   Do you have prescription coverage? Yes   Coverage [employer]    Do you have any problems affording any of your prescribed medications? No   Is the patient taking medications as prescribed? yes   Who is going to help you get home at discharge? Family   How do you get to doctors appointments? family or friend will provide   Are you on dialysis? No   Discharge Plan A Home Health;Home   DME Needed Upon Discharge  none   Discharge Plan discussed with: Patient   Transition of Care Barriers None     Pt single with no children;  Resides alone; Sister, Yesenia & Nina, assist as needed; Pt receives SS & SNAP benefits; Current with [facility]  - New order submitted via Epic; CM to follow.

## 2025-04-22 NOTE — NURSING
Ananda RN supervisor notification life vest battery is dead, pt. does not have  with him and the only person who has access to his home is his sister. He gave me permission to call w/ busy signal x2. States he hasn't talked to her in 2 weeks, as she has not answered or return his calls. MDs was notified of this and stated they would pass onto day shift for possible Case Management intervention. Ananda States he will look for [employer] life vest number and also see if we happen to have an adapter to charge some where in hospital.

## 2025-04-22 NOTE — PROGRESS NOTES
Inpatient Nutrition Assessment    Admit Date: 4/21/2025   Total duration of encounter: 1 day   Patient Age: 69 y.o.    Nutrition Recommendation/Prescription     Continue heart healthy/ 1.5 L fluid restriction  Will order boost plus -1 carton daily; Boost Plus (provides 360 kcal, 14 g protein per serving)   MVI/fe  Biweekly wt  Pt education on diet complete  Will monitor nutrition status     Communication of Recommendations: reviewed with nurse and reviewed with patient    Nutrition Assessment     Malnutrition Assessment/Nutrition-Focused Physical Exam    Malnutrition Context: chronic illness (04/22/25 1459)  Malnutrition Level: moderate (04/22/25 1459)  Energy Intake (Malnutrition): less than 75% for greater than 7 days (04/22/25 1459)  Weight Loss (Malnutrition): other (see comments) (Does not meet criteria) (04/22/25 1459)  Subcutaneous Fat (Malnutrition): mild depletion (04/22/25 1459)  Orbital Region (Subcutaneous Fat Loss): mild depletion  Upper Arm Region (Subcutaneous Fat Loss): mild depletion     Muscle Mass (Malnutrition): mild depletion (04/22/25 1459)     Clavicle Bone Region (Muscle Loss): mild depletion                    Fluid Accumulation (Malnutrition): mild (04/22/25 1459)     Hand  Strength, Right (Malnutrition): Unable to assess (04/22/25 1459)  A minimum of two characteristics is recommended for diagnosis of either severe or non-severe malnutrition.    Chart Review    Reason Seen: continuous nutrition monitoring    Malnutrition Screening Tool Results              Diagnosis:  Afib, CAD, heart failure, pulmonary HTN, volume overload, hyponatremia, ELIZABETH, anion gap acidemia, abnormal UA, PVD, anemia, COPD, glaucoma     Relevant Medical History: tobacco use, COPD, HTN, HLD, Afib, heart failure, PVD, fourniers gangrene    Scheduled Medications:  aspirin, 81 mg, Daily  atorvastatin, 80 mg, Daily  clopidogreL, 75 mg, Daily  folic acid, 1 mg, Daily  furosemide (LASIX) injection, 80 mg,  Daily  metOLazone, 10 mg, Daily  metoprolol succinate, 12.5 mg, Daily  pantoprazole, 40 mg, BID  rivaroxaban, 15 mg, Daily  sacubitriL-valsartan, 1 tablet, BID  vitamin D, 1,000 Units, Daily    Continuous Infusions:   PRN Medications:  acetaminophen, 650 mg, Q4H PRN  dextrose 50%, 12.5 g, PRN  dextrose 50%, 25 g, PRN  glucagon (human recombinant), 1 mg, PRN  glucose, 16 g, PRN  glucose, 24 g, PRN  hydrALAZINE, 10 mg, Q4H PRN  labetalol, 10 mg, Q4H PRN  melatonin, 6 mg, Nightly PRN  naloxone, 0.02 mg, PRN  nicotine, 1 patch, Daily PRN  ondansetron, 8 mg, Q8H PRN  polyethylene glycol, 17 g, Daily PRN  prochlorperazine, 5 mg, Q6H PRN  senna-docusate, 1 tablet, BID PRN  simethicone, 1 tablet, TID PRN  sodium chloride 0.9%, 10 mL, PRN    Calorie Containing IV Medications: no significant kcals from medications at this time    Recent Labs   Lab 04/21/25  1205 04/22/25  0336   * 133*   K 4.2 3.6   CALCIUM 9.9 9.5   PHOS  --  2.9   MG 2.10 1.90   CL 98 99   CO2 14* 18*   BUN 25.0 23.5   CREATININE 1.93* 1.67*   EGFRNORACEVR 37 45   GLUCOSE 90 82   BILITOT 7.7* 7.2*   ALKPHOS 191* 175*   ALT 19 18   AST 35 32   ALBUMIN 3.8 3.5   WBC 5.75 5.82   HGB 9.0* 8.6*   HCT 27.8* 26.1*     Nutrition Orders:  Diet Heart Healthy Fluid - 1500mL; Standard Tray      Appetite/Oral Intake: fair/50-75% of meals  Factors Affecting Nutritional Intake: decreased appetite and shortness of breath  Social Needs Impacting Access to Food: none identified  Food/Baptism/Cultural Preferences: none reported  Food Allergies: none reported  Last Bowel Movement: 04/20/25  Wound(s):  none    Comments    (4/22) Pt reported fair po intake; eating 50-75% meals; + LE edema; mild fat/muscle wasting; wt fluctuates with fluid; on diuretic; UBW between 205-225#. Pt willing to drink ONS; will order once daily --need to monitor fluid volume. Pt education complete on diet tx.     Anthropometrics    Height: 6' (182.9 cm), Height Method: Stated  Last Weight: 92.8  kg (204 lb 9.6 oz) (25 0610), Weight Method: Bed Scale  BMI (Calculated): 27.7  BMI Classification: overweight (BMI 25-29.9)        Ideal Body Weight (IBW), Male: 178 lb     % Ideal Body Weight, Male (lb): 119.66 %                 Usual Body Weight (UBW), k kg (wt fluctuates 205-225#; fluid)  % Usual Body Weight: 100     Usual Weight Provided By: patient and EMR weight history    Wt Readings from Last 5 Encounters:   25 92.8 kg (204 lb 9.6 oz)   25 102.1 kg (225 lb)   25 101.2 kg (223 lb)   25 102.5 kg (225 lb 14.4 oz)   02/15/25 103.6 kg (228 lb 6.3 oz)     Weight Change(s) Since Admission: -225#  Wt Readings from Last 1 Encounters:   25 0610 92.8 kg (204 lb 9.6 oz)   25 1709 96.6 kg (213 lb)   25 1137 97.5 kg (215 lb)   Admit Weight: 97.5 kg (215 lb) (25 1137), Weight Method: Stated    Estimated Needs    Weight Used For Calorie Calculations: 92.8 kg (204 lb 9.4 oz)  Energy Calorie Requirements (kcal): 2320 kcal/d; 25 tyron/kg  Energy Need Method: Kcal/kg  Weight Used For Protein Calculations: 92.8 kg (204 lb 9.4 oz)  Protein Requirements: 93 gm protein/d; 1 gm/kg  Fluid Requirements (mL): 2320 ml/d; 1ml/tyron        Enteral Nutrition     Patient not receiving enteral nutrition at this time.    Parenteral Nutrition     Patient not receiving parenteral nutrition support at this time.    Evaluation of Received Nutrient Intake    Calories: not meeting estimated needs  Protein: not meeting estimated needs    Patient Education     Education Provided: heart healthy diet  Teaching Method: explanation and printed materials  Comprehension: verbalizes understanding  Barriers to Learning: none evident  Expected Compliance: fair  Comments: All questions were answered and dietitian's contact information was provided.     Nutrition Diagnosis     PES: Inadequate oral intake related to chronic illness as evidenced by eating 75% or less meals . (new)     PES: Moderate  chronic disease or condition related malnutrition Related to chronic illness  As Evidenced by:  - energy intake: < 75% for 3 weeks (meets criteria for < 75% for > 7 days (moderate - acute)) - muscle mass depletion: 2 areas of mild muscle loss (Trapezius, Clavicle) - loss of subcutaneous fat: 3 areas of mild fat loss (Buccal, Triceps Skinfold, Infraorbital) - fluid accumulation: 1 area of mild fluid accumulation (Lower extremities edema) new    Nutrition Interventions     Intervention(s): modified composition of meals/snacks, multivitamin/mineral supplement therapy, purpose of nutrition education, and collaboration with other providers  Intervention(s): Oral nutritional supplement;Nutrition education;Care coordination or referral;Oral diet/nutrient modifications    Goal: Meet greater than 80% of nutritional needs by follow-up. (new)  Goal: Maintain weight throughout hospitalization. (new)    Nutrition Goals & Monitoring     Dietitian will monitor: food and beverage intake, weight, and food/nutrition knowledge skill  Discharge planning: continue heart healthy diet  Nutrition Risk/Follow-Up: high (follow-up in 1-4 days)   Please consult if re-assessment needed sooner.

## 2025-04-23 LAB
ALBUMIN SERPL-MCNC: 3.1 G/DL (ref 3.4–4.8)
ALBUMIN/GLOB SERPL: 0.9 RATIO (ref 1.1–2)
ALP SERPL-CCNC: 175 UNIT/L (ref 40–150)
ALT SERPL-CCNC: 18 UNIT/L (ref 0–55)
ANION GAP SERPL CALC-SCNC: 12 MEQ/L
ANION GAP SERPL CALC-SCNC: 13 MEQ/L
AST SERPL-CCNC: 36 UNIT/L (ref 11–45)
BACTERIA UR CULT: ABNORMAL
BASOPHILS # BLD AUTO: 0.01 X10(3)/MCL
BASOPHILS NFR BLD AUTO: 0.2 %
BILIRUB SERPL-MCNC: 6.8 MG/DL
BUN SERPL-MCNC: 19.8 MG/DL (ref 8.4–25.7)
BUN SERPL-MCNC: 20.7 MG/DL (ref 8.4–25.7)
CALCIUM SERPL-MCNC: 9.5 MG/DL (ref 8.8–10)
CALCIUM SERPL-MCNC: 9.6 MG/DL (ref 8.8–10)
CHLORIDE SERPL-SCNC: 96 MMOL/L (ref 98–107)
CHLORIDE SERPL-SCNC: 96 MMOL/L (ref 98–107)
CO2 SERPL-SCNC: 28 MMOL/L (ref 23–31)
CO2 SERPL-SCNC: 28 MMOL/L (ref 23–31)
CREAT SERPL-MCNC: 1.22 MG/DL (ref 0.72–1.25)
CREAT SERPL-MCNC: 1.29 MG/DL (ref 0.72–1.25)
CREAT/UREA NIT SERPL: 16
CREAT/UREA NIT SERPL: 16
EOSINOPHIL # BLD AUTO: 0.1 X10(3)/MCL (ref 0–0.9)
EOSINOPHIL NFR BLD AUTO: 2.4 %
ERYTHROCYTE [DISTWIDTH] IN BLOOD BY AUTOMATED COUNT: 23.9 % (ref 11.5–17)
GFR SERPLBLD CREATININE-BSD FMLA CKD-EPI: >60 ML/MIN/1.73/M2
GFR SERPLBLD CREATININE-BSD FMLA CKD-EPI: >60 ML/MIN/1.73/M2
GLOBULIN SER-MCNC: 3.5 GM/DL (ref 2.4–3.5)
GLUCOSE SERPL-MCNC: 102 MG/DL (ref 82–115)
GLUCOSE SERPL-MCNC: 106 MG/DL (ref 82–115)
HCT VFR BLD AUTO: 27.3 % (ref 42–52)
HGB BLD-MCNC: 9.2 G/DL (ref 14–18)
HOLD SPECIMEN: NORMAL
HOLD SPECIMEN: NORMAL
IMM GRANULOCYTES # BLD AUTO: 0.02 X10(3)/MCL (ref 0–0.04)
IMM GRANULOCYTES NFR BLD AUTO: 0.5 %
LACTATE SERPL-SCNC: 3 MMOL/L (ref 0.5–2.2)
LACTATE SERPL-SCNC: 3.5 MMOL/L (ref 0.5–2.2)
LACTATE SERPL-SCNC: 3.6 MMOL/L (ref 0.5–2.2)
LACTATE SERPL-SCNC: 3.6 MMOL/L (ref 0.5–2.2)
LYMPHOCYTES # BLD AUTO: 0.56 X10(3)/MCL (ref 0.6–4.6)
LYMPHOCYTES NFR BLD AUTO: 13.3 %
MAGNESIUM SERPL-MCNC: 1.6 MG/DL (ref 1.6–2.6)
MCH RBC QN AUTO: 24.6 PG (ref 27–31)
MCHC RBC AUTO-ENTMCNC: 33.7 G/DL (ref 33–36)
MCV RBC AUTO: 73 FL (ref 80–94)
MONOCYTES # BLD AUTO: 0.51 X10(3)/MCL (ref 0.1–1.3)
MONOCYTES NFR BLD AUTO: 12.1 %
NEUTROPHILS # BLD AUTO: 3 X10(3)/MCL (ref 2.1–9.2)
NEUTROPHILS NFR BLD AUTO: 71.5 %
NRBC BLD AUTO-RTO: 0 %
PHOSPHATE SERPL-MCNC: 2.3 MG/DL (ref 2.3–4.7)
PLATELET # BLD AUTO: 189 X10(3)/MCL (ref 130–400)
PLATELETS.RETICULATED NFR BLD AUTO: 2.8 % (ref 0.9–11.2)
PMV BLD AUTO: 10.5 FL (ref 7.4–10.4)
POTASSIUM SERPL-SCNC: 2.7 MMOL/L (ref 3.5–5.1)
POTASSIUM SERPL-SCNC: 3.2 MMOL/L (ref 3.5–5.1)
PROT SERPL-MCNC: 6.6 GM/DL (ref 5.8–7.6)
RBC # BLD AUTO: 3.74 X10(6)/MCL (ref 4.7–6.1)
RET# (OHS): 0.09 X10E6/UL (ref 0.03–0.1)
RETICULOCYTE COUNT AUTOMATED (OLG): 2.45 % (ref 1.1–2.1)
SODIUM SERPL-SCNC: 136 MMOL/L (ref 136–145)
SODIUM SERPL-SCNC: 137 MMOL/L (ref 136–145)
WBC # BLD AUTO: 4.2 X10(3)/MCL (ref 4.5–11.5)

## 2025-04-23 PROCEDURE — 83735 ASSAY OF MAGNESIUM: CPT

## 2025-04-23 PROCEDURE — 25000003 PHARM REV CODE 250

## 2025-04-23 PROCEDURE — 36415 COLL VENOUS BLD VENIPUNCTURE: CPT

## 2025-04-23 PROCEDURE — 25000003 PHARM REV CODE 250: Performed by: STUDENT IN AN ORGANIZED HEALTH CARE EDUCATION/TRAINING PROGRAM

## 2025-04-23 PROCEDURE — 94761 N-INVAS EAR/PLS OXIMETRY MLT: CPT

## 2025-04-23 PROCEDURE — 85025 COMPLETE CBC W/AUTO DIFF WBC: CPT

## 2025-04-23 PROCEDURE — 20000000 HC ICU ROOM

## 2025-04-23 PROCEDURE — 84100 ASSAY OF PHOSPHORUS: CPT

## 2025-04-23 PROCEDURE — 63600175 PHARM REV CODE 636 W HCPCS

## 2025-04-23 PROCEDURE — 83605 ASSAY OF LACTIC ACID: CPT

## 2025-04-23 PROCEDURE — 80053 COMPREHEN METABOLIC PANEL: CPT

## 2025-04-23 PROCEDURE — 27000207 HC ISOLATION

## 2025-04-23 PROCEDURE — 93005 ELECTROCARDIOGRAM TRACING: CPT

## 2025-04-23 PROCEDURE — 85045 AUTOMATED RETICULOCYTE COUNT: CPT

## 2025-04-23 RX ORDER — MUPIROCIN 20 MG/G
OINTMENT TOPICAL 2 TIMES DAILY
Status: COMPLETED | OUTPATIENT
Start: 2025-04-23 | End: 2025-04-28

## 2025-04-23 RX ORDER — POTASSIUM CHLORIDE 20 MEQ/1
40 TABLET, EXTENDED RELEASE ORAL ONCE
Status: COMPLETED | OUTPATIENT
Start: 2025-04-23 | End: 2025-04-23

## 2025-04-23 RX ORDER — POTASSIUM CHLORIDE 7.45 MG/ML
10 INJECTION INTRAVENOUS
Status: DISCONTINUED | OUTPATIENT
Start: 2025-04-23 | End: 2025-04-23

## 2025-04-23 RX ORDER — SODIUM CHLORIDE 9 MG/ML
INJECTION, SOLUTION INTRAVENOUS CONTINUOUS
Status: DISCONTINUED | OUTPATIENT
Start: 2025-04-23 | End: 2025-04-23

## 2025-04-23 RX ORDER — PANTOPRAZOLE SODIUM 40 MG/1
40 TABLET, DELAYED RELEASE ORAL DAILY
Status: DISCONTINUED | OUTPATIENT
Start: 2025-04-24 | End: 2025-05-02

## 2025-04-23 RX ORDER — DOBUTAMINE HYDROCHLORIDE 400 MG/100ML
5 INJECTION INTRAVENOUS CONTINUOUS
Status: DISCONTINUED | OUTPATIENT
Start: 2025-04-23 | End: 2025-04-24

## 2025-04-23 RX ORDER — NOREPINEPHRINE BITARTRATE/D5W 4MG/250ML
0-3 PLASTIC BAG, INJECTION (ML) INTRAVENOUS CONTINUOUS
Status: DISCONTINUED | OUTPATIENT
Start: 2025-04-23 | End: 2025-05-01

## 2025-04-23 RX ORDER — MAGNESIUM SULFATE HEPTAHYDRATE 40 MG/ML
2 INJECTION, SOLUTION INTRAVENOUS ONCE
Status: COMPLETED | OUTPATIENT
Start: 2025-04-23 | End: 2025-04-23

## 2025-04-23 RX ADMIN — MUPIROCIN: 20 OINTMENT TOPICAL at 08:04

## 2025-04-23 RX ADMIN — POTASSIUM CHLORIDE 40 MEQ: 1500 TABLET, EXTENDED RELEASE ORAL at 01:04

## 2025-04-23 RX ADMIN — POTASSIUM CHLORIDE 10 MEQ: 7.46 INJECTION, SOLUTION INTRAVENOUS at 07:04

## 2025-04-23 RX ADMIN — ACETAMINOPHEN 650 MG: 325 TABLET, FILM COATED ORAL at 12:04

## 2025-04-23 RX ADMIN — Medication 1000 UNITS: at 08:04

## 2025-04-23 RX ADMIN — POTASSIUM CHLORIDE 10 MEQ: 7.46 INJECTION, SOLUTION INTRAVENOUS at 06:04

## 2025-04-23 RX ADMIN — ATORVASTATIN CALCIUM 80 MG: 40 TABLET, FILM COATED ORAL at 08:04

## 2025-04-23 RX ADMIN — ASPIRIN 81 MG: 81 TABLET, COATED ORAL at 08:04

## 2025-04-23 RX ADMIN — FOLIC ACID 1 MG: 1 TABLET ORAL at 08:04

## 2025-04-23 RX ADMIN — RIVAROXABAN 20 MG: 10 TABLET, FILM COATED ORAL at 04:04

## 2025-04-23 RX ADMIN — MAGNESIUM SULFATE HEPTAHYDRATE 2 G: 40 INJECTION, SOLUTION INTRAVENOUS at 06:04

## 2025-04-23 RX ADMIN — DOBUTAMINE HYDROCHLORIDE 2.5 MCG/KG/MIN: 400 INJECTION INTRAVENOUS at 10:04

## 2025-04-23 RX ADMIN — POTASSIUM CHLORIDE 10 MEQ: 7.46 INJECTION, SOLUTION INTRAVENOUS at 09:04

## 2025-04-23 RX ADMIN — NOREPINEPHRINE BITARTRATE 0.02 MCG/KG/MIN: 4 INJECTION, SOLUTION INTRAVENOUS at 04:04

## 2025-04-23 RX ADMIN — CLOPIDOGREL BISULFATE 75 MG: 75 TABLET, FILM COATED ORAL at 08:04

## 2025-04-23 RX ADMIN — PANTOPRAZOLE SODIUM 40 MG: 40 TABLET, DELAYED RELEASE ORAL at 08:04

## 2025-04-23 RX ADMIN — POTASSIUM BICARBONATE 50 MEQ: 978 TABLET, EFFERVESCENT ORAL at 05:04

## 2025-04-23 RX ADMIN — POTASSIUM CHLORIDE 40 MEQ: 1500 TABLET, EXTENDED RELEASE ORAL at 04:04

## 2025-04-23 RX ADMIN — MAGNESIUM SULFATE HEPTAHYDRATE 2 G: 40 INJECTION, SOLUTION INTRAVENOUS at 08:04

## 2025-04-23 NOTE — PROGRESS NOTES
04/23/25 0957   Missed Time Reason   OT Attempted Eval Date 04/23/25   OT Attempted Eval Time 0957   Missed Treatment Reason Other (Comment)  (hold OT eval due to hypotensive)     OT eval on hold due to low BP and lethargic.  Initial BP while supine in bed 70/51 and after repositioning in bed 67/50.  Will attempt again as schedule permits and once appropriate for eval.

## 2025-04-23 NOTE — PLAN OF CARE
Problem: Adult Inpatient Plan of Care  Goal: Absence of Hospital-Acquired Illness or Injury  Outcome: Progressing     Problem: COPD (Chronic Obstructive Pulmonary Disease)  Goal: Absence of Infection Signs and Symptoms  Outcome: Progressing     Problem: Heart Failure  Goal: Effective Breathing Pattern During Sleep  Outcome: Progressing     Problem: Fall Injury Risk  Goal: Absence of Fall and Fall-Related Injury  Outcome: Progressing     Problem: Dysrhythmia  Goal: Normalized Cardiac Rhythm  Outcome: Progressing

## 2025-04-23 NOTE — PLAN OF CARE
Problem: Skin Injury Risk Increased  Goal: Skin Health and Integrity  Outcome: Progressing     Problem: COPD (Chronic Obstructive Pulmonary Disease)  Goal: Optimal Chronic Illness Coping  Outcome: Progressing  Goal: Optimal Level of Functional [place]  Outcome: Progressing  Goal: Absence of Infection Signs and Symptoms  Outcome: Progressing     Problem: Heart Failure  Goal: Optimal Coping  Outcome: Progressing  Goal: Optimal Cardiac Output  Outcome: Progressing  Goal: Effective Breathing Pattern During Sleep  Outcome: Progressing     Problem: Fall Injury Risk  Goal: Absence of Fall and Fall-Related Injury  Outcome: Progressing     Problem: Wound  Goal: Absence of Infection Signs and Symptoms  Outcome: Progressing

## 2025-04-23 NOTE — NURSING
MD made aware, (Team 3) of patient blood pressure still low after several blood pressure checks even after it came back up in the 90's. Physical therapy attempted to evaluate patient but blood pressure is still low as they were unable to do evaluation.       Vitals have been taken for additional assessment. Patient is lying in bed resting; he is slightly lethargic but easily aroused. Patient denies SOB or chest pain. Per patient states some dizziness noted.

## 2025-04-23 NOTE — PROGRESS NOTES
[facility] Wards   Progress Note     Resident Team: [facility] Medicine List 3  Attending Physician: Lennox Vinson MD  Resident: Yesika  Intern: Lico    Subjective:      Brief HPI:  Ran Reyes Jr. is a 69 y.o.  male with PMH of tobacco dependence, chronic obstructive pulmonary disease, hypertension, pulmonary hypertension, hyperlipidemia, chronic persistent atrial fibrillation on Xarelto, nonobstructive coronary artery disease, nonischemic cardiomyopathy, heart failure with with reduced ejection fraction (EF 30%), peripheral vascular disease s/p stenting to superficial femoral artery and right tibial artery, renée's gangrene (03/2024), primary open-angle glaucoma, who presented to [facility] ED (4/21/2025) due to generalized fatigue and weakness x 3-5 days. Patient reports he can only ambulate about 20-30 feet before having to stop due to claudication pain which is chronic and unchanged compared to baseline. Since running out of his diuretic medications approximately 5 days ago he has noted progressively worsening lower extremity swelling causing leg heaviness and weakness exacerbating difficulty ambulating.  He also notes acute on chronic cough productive of a brownish sputum without hemoptysis.  Denies fever, chills, sweats.  Denies chest pain.  Endorses shortness of breath at rest and with ambulation but unchanged compared to baseline.  Denies abdominal pain, nausea, vomiting, constipation, diarrhea.  Denies increased or decreased urinary frequency, dysuria, or hematuria.  Sleeps with 2 pillows at night due to baseline orthopnea. Has not had to increase number of pillows or change sleeping habits. Wears 2L NC home oxygen at baseline for hx of COPD. Denies having increased oxygen requirements prior to ED presentation.     ED course:  Vital signs stable.  Oxygenation stable on 2 L nasal cannula.  CBC shows microcytic anemic (HGB 9.0; MCV 77).  CMP shows hyponatremia (Na 128), acidemia (CO2 14),  elevated renal indices (BUN 25; creatinine 1.93), elevated alkaline phosphatase (). Troponin WNL.  BNP 26 39.5.  UDS negative.  EKG showed atrial fibrillation with PVCs.  Chest x-ray difficult to interpret due to overlying medical devices however appears to show cardiomegaly with bilateral pulmonary vascular congestion with question of bilateral pleural effusions.  Internal Medicine consulted for admission for acute CHF exacerbation and cardiorenal syndrome.    Interval History:     NAEON, mildly hypotensive; entresto held overnight. Patient without complaints this AM, but appears drowsy again. Reports improved swelling and shortness of breath. Patient UA with ESBL E. Col, but remains asymptomatic. Cultures sensitive to Zosyn. Will continue to cautiously diurese, and hold morning entresto/diuresis. K 2.7 this AM, Cr 1.2.      Review of Systems:  Constitutional:  Positive for fatigue. Negative for chills, diaphoresis and fever.   HENT:  Negative for ear pain, hearing loss, rhinorrhea, sore throat, tinnitus and trouble swallowing.    Eyes:  Negative for pain, redness and visual disturbance.   Respiratory:  Positive for shortness of breath. Negative for cough and chest tightness.    Cardiovascular:  Positive for leg swelling. Negative for chest pain and palpitations.   Gastrointestinal:  Negative for abdominal pain, constipation, diarrhea, nausea and vomiting.   Genitourinary:  Negative for difficulty urinating, dysuria, frequency, hematuria and urgency.   Musculoskeletal:  Negative for arthralgias and myalgias.   Skin:  Negative for rash and wound.   Neurological:  Negative for dizziness, seizures, syncope, weakness, light-headedness, numbness and headaches.   Psychiatric/Behavioral:  Negative for confusion.             Objective:     Vital Signs (Most Recent):  Temp: 97.5 °F (36.4 °C) (04/23/25 0338)  Pulse: 61 (04/23/25 0338)  Resp: 20 (04/23/25 0338)  BP: (!) 87/51 (MAP 63) (04/23/25 0340)  SpO2: 95 %  (04/23/25 0600) Vital Signs (24h Range):  Temp:  [97.3 °F (36.3 °C)-97.6 °F (36.4 °C)] 97.5 °F (36.4 °C)  Pulse:  [57-73] 61  Resp:  [18-20] 20  SpO2:  [95 %-100 %] 95 %  BP: ()/(51-67) 87/51       Physical Examination:  General: Patient resting comfortably, in no acute distress; 100% on 2L NC  Eye: pupils equal, EOMI, clear conjunctiva, eyelids normal  HENT: Head-normocephalic and atraumatic  Neck: full range of motion, no thyromegaly or lymphadenopathy, trachea midline, supple  Respiratory: bilateral diffuse crackles  Cardiovascular: regular rate and rhythm without murmurs.  No gallops or rubs, Mild JVD.  Capillary refill within normal limits. LE B/L 3+ pitting edema  Gastrointestinal: soft, non-tender, mildly distended with normal bowel sounds, without masses to palpation  Genitourinary: no CVA tenderness to palpation  Musculoskeletal: full range of motion of all extremities/spine without limitation or discomfort  Integumentary: no rashes or skin lesions present  Neurologic: no signs of peripheral neurological deficit, motor/sensory function intact  Psychiatric:  alert and oriented, cognitive function intact, cooperative with exam      Laboratory:  Trended Lab Data:  Recent Labs   Lab 04/21/25  1205 04/22/25  0336 04/23/25  0345   WBC 5.75 5.82 4.20*   HGB 9.0* 8.6* 9.2*   HCT 27.8* 26.1* 27.3*    195 189   MCV 77.0* 76.3* 73.0*   RDW 23.7* 23.5* 23.9*   * 133* 136   K 4.2 3.6 2.7*   CL 98 99 96*   CO2 14* 18* 28   BUN 25.0 23.5 19.8   CREATININE 1.93* 1.67* 1.22   ALBUMIN 3.8 3.5 3.1*   BILITOT 7.7* 7.2* 6.8*   AST 35 32 36   ALKPHOS 191* 175* 175*   ALT 19 18 18       Microbiology Data Reviewed:   Pertinent Findings:      Other Results:  EKG (my interpretation): .    Radiology:   No new imaging.    Antibiotics and Day Number of Therapy:        Intake/Output Summary (Last 24 hours) at 4/23/2025 0651  Last data filed at 4/23/2025 0351  Gross per 24 hour   Intake 720 ml   Output 4150 ml   Net  -3430 ml         Assessment & Plan:   Chronic persistent atrial fibrillation  Nonobstructive coronary artery disease  Nonischemic cardiomyopathy  Heart failure with with reduced ejection fraction (EF 30%); NYHA Class III Stage C  Hx of VT arrest  Pulmonary hypertension, likely combined group II and III  CHF exacerbation  -given IV lasix 80 mg x 1 dose in ED  -strict intake and output  -cardiac diet with 1500 fluid restriction  -daily weights  -continue entresto, LD; hold as needed for soft BP's  -continue home metoprolol succinate 12.5 mg q.d., atorvastatin 80 mg q.d., aspirin 81 mg q.d., Plavix 75 mg q.d.  - continue metolazone 10 mg q.d. to augment Lasix and increase diuretic effect  - continue lasix IV 80 mg daily  - will hold diuresis as needed for soft BP's  -avoiding Jardiance due to history of renée's gangrene  -renal indices improved, diuresing well  -consulted case management for home health  - consulted PT/OT for ambulation  - 4L UO since yesterday, net loss 20 lbs total and 8L UO  - repleting K as needed     Hypervolemic hyponatremia 2/2 volume overload  Chronic moderate hyponatremia  - improving with diuresis  - will hold on administering sodium containing medications for now  -fluid restriction  -treat underlying CHF exacerbation with diuretics  - may require combination treatment with IV diuretics plus hypertonic saline if hyponatremia worsens or patient becomes symptomatic     Acute kidney injury  Normal anion gap acidemia  Penile shaft edema  -baseline renal indices approximately: BUN 25, creatinine 1.1, eGFR > 60  -renal indices elevated at admission compared to baseline  -BUN:Cr ratio < 20; suggestive of intrinsic versus extrinsic disease  -retroperitoneal US wnl  -considering ELIZABETH most likely cardiorenal syndrome  -treating underlying cause, improved Cr 1.2     Abnormal urinalysis  UTI ESBL E.coli  -patient denies symptoms concerning for underlying UTI  -urinalysis likely dirty catch due to  presence of occasional squamous cells in the setting of penile edema  -urine cultures + for ESBL E. Coli, sensitive to Zosyn  - asymptomatic, but will start Zosyn 4.5 mg q6h     Peripheral vascular disease s/p stenting to superficial femoral artery and right tibial artery  -CV doppler arterial US (12/06/2024) suggestive of inflow disease on the left and pSFA disease on the right   -not a candidate for Cilostazol due to HFrEF  -continue medical management while inpatient  -will need referral to vascular surgery to consider peripheral angiography +/- intervention upon discharge     Microcytic anemia  -anemia chronic per lab review with hemoglobin remaining stable close to baseline 9  -MCV progressively declining over the past year  -will obtain iron panel, folate, b12  -consider repleting if indiciated  -continue home folic acid 1 mg qd  - will get retic cnt, peripheral smear     Tobacco use disorder  Chronic obstructive pulmonary disease  -continue home 2L NC supplementation  -prn nicotine patch ordered prn      Primary open-angle glaucoma  -patient will need repeat referral to [facility] ophthalmology for outpatient follow up as patient has not been seen/evaluated in some time    CODE STATUS: Full Code  Access: Peripheral  Antibiotics: None  Diet: Cardiac  DVT Prophylaxis: xarelto  GI Prophylaxis: none needed  Fluids: none  ml/hr.     Disposition: Patient admitted and being treated for HF exacerbation; acute on chronic; remains hypervolemic, currently titrating diuresis; mildly hypotensive; also has ESBL UTI    Larry Barfield MD  [facility]  HO-1

## 2025-04-23 NOTE — PT/OT/SLP PROGRESS
Physical Therapy    Missed Treatment Session - cancel note - 0957 - 04/23/2025    Patient Name:  Ran Reyes Jr.   MRN:  [id number]      -OT Sailaja and student observer Roberto in room for therapy session  -patient not seen at this time secondary to patient not appropriate  -low BP noted (see below)    Vitals   Vitals at Rest supine  BP  70/51     -patient repositioned to HOB w/ bed assist and patient assist    Vitals at Rest supine after repositioning to [facility] and center of bed  BP  67/50   HR  67   O2 Sat %  100      Vitals at Rest supine at treatment end  BP  71/48   HR  54   O2 Sat %  92     -EVAL cx'ed  -patients nurse Mariam notified  -will follow-up as patient is appropriate/available/agreeable to participate and as therapists' schedule allows.

## 2025-04-23 NOTE — PT/OT/SLP PROGRESS
Physical Therapy    Missed Treatment Session - cancel note - 1708 - 04/23/2025    Patient Name:  Ran Reyes Jr.   MRN:  [id number]      -patient not seen at this time secondary to transfer to ICU, await clearance  -upgrade to ICU at 1625 04/23/2025  -current orders discontinued  -PT SERVICES will need 'new' orders to initiate EVAL  -physician Valerio Shrestha MD notified by Secure Chat

## 2025-04-23 NOTE — NURSING
At patient's bedside; Dr. Shrestha here on rounds to assess patient. Dobutamine started at 3.3ml/hr due to patient hypotension this morning.

## 2025-04-23 NOTE — H&P
[facility] - ICU  Pulmonary Critical Care Note    Patient Name: Ran Reyes Jr.  MRN: [id number]  Admission Date: 4/21/2025  Hospital Length of Stay: 1 days  Code Status: Full Code  Attending Provider: Lennox Vinson MD  Primary Care Provider: Abiodun Recinos DO     Subjective:     HPI:   Ran Reyes Jr. is a 69 y.o.  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 Patient reports he can only ambulate about 20-30 feet before having to stop due to claudication pain which is chronic and unchanged compared to baseline. Since running out of his diuretic medications approximately 5 days ago he has noted progressively worsening lower extremity swelling causing leg heaviness and weakness exacerbating difficulty ambulating.  He also notes acute on chronic cough productive of a brownish sputum without hemoptysis.  Denies fever, chills, sweats.  Denies chest pain.  Endorses shortness of breath at rest and with ambulation but unchanged compared to baseline.  Denies abdominal pain, nausea, vomiting, constipation, diarrhea.  Denies increased or decreased urinary frequency, dysuria, or hematuria.  Sleeps with 2 pillows at night due to baseline orthopnea. Has not had to increase number of pillows or change sleeping habits. Wears 2L NC home oxygen at baseline for hx of COPD. Denies having increased oxygen requirements prior to ED presentation.     ED course:  Vital signs stable.  Oxygenation stable on 2 L  nasal cannula.  CBC shows microcytic anemic (HGB 9.0; MCV 77).  CMP shows hyponatremia (Na 128), acidemia (CO2 14), elevated renal indices (BUN 25; creatinine 1.93), elevated alkaline phosphatase (). Troponin WNL.  BNP 26 39.5.  UDS negative.  EKG showed atrial fibrillation with PVCs.  Chest x-ray difficult to interpret due to overlying medical devices however appears to show cardiomegaly with bilateral pulmonary vascular congestion with question of bilateral pleural effusions. He was admitted for acute CHF exacerbation and cardiorenal syndrome.     Hospital Course/Significant events:  Overnight patient noted with hypotension. This am patient appeared fatigued and drowsy. Responds to voice. Improved BLE edema from admission but gut edema still present. Urine culture was positive for ESBL E Coli but patient asymptomatic. Patient was never started on Zosyn. Patient received 2 dose of  IV 80 mg Lasix and po Metolazone 10 mg with UOP of 4.1 L  overnight. Patient was started on Dobutamine 2.5 for his hypotension. Lactic acid elevated at 3.5. Cardiology consulted. Recs to increase Dobutamine infusion to 5 mcg/kg/min and start Levophed. Critical care medicine consulted for admission and management of cardiogenic shock    24 Hour Interval History:  N/a     Past Medical History:   Diagnosis Date    A-fib     Anticoagulant long-term use     Anxiety     Aortic aneurysm     Cataract     CHF (congestive heart failure)     Chronic atrial fibrillation     COPD (chronic obstructive pulmonary disease)     Coronary artery disease     Depression     HLD (hyperlipidemia)     Hypertension     Mitral regurgitation     PAD (peripheral artery disease)     Primary open angle glaucoma (POAG)        Past Surgical History:   Procedure Laterality Date    ANGIOGRAM, CORONARY, WITH LEFT HEART CATHETERIZATION N/A 03/26/2024    Procedure: Angiogram, Coronary, with Left Heart Cath;  Surgeon: Adriano Montiel MD;  Location: [facility] CATH LAB;   Service: Cardiology;  Laterality: N/A;    ATHERECTOMY OF PERIPHERAL VESSEL Left 09/12/2022    LEFT SFA ATHERECTOMY, BALLOON ANGIOPLASTY    CATARACT EXTRACTION W/  INTRAOCULAR LENS IMPLANT Left 03/09/2023    Procedure: EXTRACTION, CATARACT, WITH IOL INSERTION;  Surgeon: Georgi Borja MD;  Location: [facility];  Service: Ophthalmology;  Laterality: Left;  19.5  mac    EGD, WITH CLOSED BIOPSY  03/22/2024    Procedure: EGD, WITH CLOSED BIOPSY;  Surgeon: Ronald Calderon MD;  Location: [facility] ENDOSCOPY;  Service: Gastroenterology;;    ESOPHAGOGASTRODUODENOSCOPY N/A 03/22/2024    Procedure: EGD;  Surgeon: Ronald Calderon MD;  Location: [facility] ENDOSCOPY;  Service: Gastroenterology;  Laterality: N/A;    Heart Stent N/A     > 10yrs. AGO    INCISION AND DRAINAGE N/A 03/18/2024    Procedure: Incision and Drainage;  Surgeon: Fahad Rivas MD;  Location: [facility];  Service: Urology;  Laterality: N/A;  I&D SCROTAL ABSCESS    INSERTION OF STENT INTO PERIPHERAL VESSEL Right 10/17/2022    RIGHT SFA ATHERECTOMY, BALLOON ANGIOPLASTY, STENT; RIGHT ANTERIOR TIBIAL ARTERY ATHERECTOMY, BALLOON ANGIOPLASTY    ORCHIECTOMY Left 03/18/2024    Procedure: ORCHIECTOMY;  Surgeon: Fahad Rivas MD;  Location: [facility];  Service: Urology;  Laterality: Left;       Social History[1]        Current Outpatient Medications   Medication Instructions    acetaminophen 650 mg, 2 times daily PRN    ALBUTEROL INHL 2 puffs, Every 6 hours PRN    albuterol-ipratropium (DUO-NEB) 2.5 mg-0.5 mg/3 mL nebulizer solution 3 mLs, Nebulization, Every 6 hours PRN, Rescue    aspirin (ECOTRIN) 81 MG EC tablet 1 tablet, Daily    atorvastatin (LIPITOR) 80 mg, Oral, Daily    BREZTRI AEROSPHERE 160-9-4.8 mcg/actuation HFAA 2 puffs, 2 times daily    cetirizine (ZYRTEC) 10 mg, Oral, Daily    clopidogreL (PLAVIX) 75 mg, Oral, Daily    folic acid (FOLVITE) 1 mg, Oral, Daily    furosemide (LASIX) 40 mg, Oral, 2 times daily    gabapentin (NEURONTIN)  300 mg, Oral, 3 times daily    metoprolol succinate (TOPROL-XL) 12.5 mg, Oral, Daily    nicotine (NICODERM CQ) 14 mg/24 hr 1 patch, Transdermal, Daily PRN    nicotine (NICODERM CQ) 21 mg/24 hr 1 patch, Transdermal, Daily    nitrofurantoin, macrocrystal-monohydrate, (MACROBID) 100 MG capsule 100 mg, Oral, 2 times daily    olopatadine (PATANOL) 0.1 % ophthalmic solution Apply to eye.    pantoprazole (PROTONIX) 40 mg, Oral, 2 times daily    potassium chloride SA (K-DUR,KLOR-CON) 20 MEQ tablet 20 mEq, Oral, 2 times daily    rivaroxaban (XARELTO) 20 mg, Oral, Daily    sacubitriL-valsartan (ENTRESTO) 49-51 mg per tablet 1 tablet, Daily    sertraline (ZOLOFT) 100 mg, Oral, Daily    urea (CARMOL) 40 % Crea Apply topically.    varenicline tartrate (CHANTIX) 0.5 mg    vitamin D (VITAMIN D3) 1,000 Units, Daily       Review of patient's allergies indicates:  No Known Allergies     Current Inpatient Medications   aspirin  81 mg Oral Daily    atorvastatin  80 mg Oral Daily    clopidogreL  75 mg Oral Daily    folic acid  1 mg Oral Daily    mupirocin   Nasal BID    [START ON 4/24/2025] pantoprazole  40 mg Oral Daily    potassium chloride  40 mEq Oral Once    rivaroxaban  20 mg Oral Daily    vitamin D  1,000 Units Oral Daily       Current Intravenous Infusions   DOBUTamine IV infusion (non-titrating)  5 mcg/kg/min Intravenous Continuous 3.3 mL/hr at 04/23/25 1045 2.5 mcg/kg/min at 04/23/25 1045    NORepinephrine bitartrate-D5W  0-3 mcg/kg/min Intravenous Continuous             Review of Systems   Constitutional:  Negative for fever.   Eyes:  Negative for blurred vision.   Respiratory:  Positive for shortness of breath. Negative for cough and wheezing.    Cardiovascular:  Positive for leg swelling. Negative for chest pain and palpitations.   Gastrointestinal:  Negative for abdominal pain, constipation, diarrhea, nausea and vomiting.   Genitourinary:  Negative for dysuria, frequency and urgency.   Musculoskeletal:  Negative for back  pain.   Neurological:  Positive for dizziness (mild lightheadedness). Negative for speech change, focal weakness, weakness and headaches.          Objective:       Intake/Output Summary (Last 24 hours) at 4/23/2025 1627  Last data filed at 4/23/2025 0351  Gross per 24 hour   Intake 360 ml   Output 1450 ml   Net -1090 ml         Vital Signs (Most Recent):  Temp: 97.4 °F (36.3 °C) (04/23/25 0802)  Pulse: (!) 48 (04/23/25 1421)  Resp: 18 (04/23/25 0802)  BP: (!) 86/53 (04/23/25 1421)  SpO2: (!) 89 % (04/23/25 1219)  Body mass index is 26.25 kg/m².  Weight: 87.8 kg (193 lb 9 oz) Vital Signs (24h Range):  Temp:  [97.4 °F (36.3 °C)-97.6 °F (36.4 °C)] 97.4 °F (36.3 °C)  Pulse:  [47-84] 48  Resp:  [18-20] 18  SpO2:  [88 %-100 %] 89 %  BP: (64-97)/(43-66) 86/53     Physical Exam  Vitals reviewed.   Constitutional:       General: He is not in acute distress.     Appearance: He is ill-appearing.   HENT:      Head: Normocephalic and atraumatic.      Right Ear: External ear normal.      Left Ear: External ear normal.   Eyes:      General: No scleral icterus.     Pupils: Pupils are equal, round, and reactive to light.   Cardiovascular:      Rate and Rhythm: Bradycardia present. Rhythm irregular.      Pulses: Normal pulses.      Heart sounds: Normal heart sounds. No murmur heard.     No friction rub. No gallop.   Pulmonary:      Effort: Pulmonary effort is normal. No respiratory distress.      Breath sounds: No stridor. No wheezing, rhonchi or rales.   Abdominal:      General: Bowel sounds are normal. There is distension.      Palpations: Abdomen is soft.      Tenderness: There is no abdominal tenderness. There is no guarding.   Genitourinary:     Comments: Penile shaft and head edematous - improving   No erythema or tenderness to palpation  Musculoskeletal:         General: Normal range of motion.      Right lower leg: Edema (2+) present.      Left lower leg: Edema (2+) present.   Skin:     General: Skin is warm and dry.       Coloration: Skin is not jaundiced.      Findings: No bruising, erythema or rash.   Neurological:      Mental Status: He is alert.      Comments: AAO to person, place, time, and situation; CN II-XII grossly intact   Psychiatric:         Mood and Affect: Mood normal.           Lines/Drains/Airways       Peripheral Intravenous Line  Duration                  Peripheral IV - Single Lumen 20 G Left;Posterior Hand -- days                    Significant Labs:    Lab Results   Component Value Date    WBC 4.20 (L) 04/23/2025    HGB 9.2 (L) 04/23/2025    HCT 27.3 (L) 04/23/2025    MCV 73.0 (L) 04/23/2025     04/23/2025           BMP  Lab Results   Component Value Date     04/23/2025    K 3.2 (L) 04/23/2025    CO2 28 04/23/2025    BUN 20.7 04/23/2025    CREATININE 1.29 (H) 04/23/2025    CALCIUM 9.5 04/23/2025    AGAP 13.0 04/23/2025    EGFRNONAA 88 (L) 12/06/2021         ABG  Recent Labs   Lab 04/21/25  1739   PH 7.280*   PO2 31.0   PCO2 39.0*   HCO3 18.3   POCBASEDEF -7.80       Mechanical Ventilation Support:  Oxygen Concentration (%): 28 (04/22/25 0739)      Significant Imaging:  I have reviewed the pertinent imaging within the past 24 hours.        Assessment/Plan:     Assessment  Cardiogenic shock  Acute on chronic CHF exacerbation  Chronic persistent atrial fibrillation  Nonobstructive coronary artery disease  Nonischemic cardiomyopathy  Heart failure with with reduced ejection fraction (EF 30%); NYHA Class III Stage C  Hx of VT arrest  Pulmonary hypertension, likely combined group II and III  PVD  ELIZABETH  COPD - 2 L supplemental oxygen dependent at home  Tobacco dependence  Microcytic anemia        Plan  Admit to ICU for ongoing care  Increase Dobutamine to 5 mcg/kg/min for more inotropic support  Start Levophed infusion for MAP goal of 58-60 and wean as tolerated  Strict I&O and daily weight  Cardiology consulted, recs appreciated  Hold off on further IV Diuresis and GDMT  Continue with Xarelto 20 mg  daily  Continue DAPT and Statin    DVT Prophylaxis: Xarelto  GI Prophylaxis: PPI     32 minutes of critical care was time spent personally by me on the following activities: development of treatment plan with patient or surrogate and bedside caregivers, discussions with consultants, evaluation of patient's response to treatment, examination of patient, ordering and performing treatments and interventions, ordering and review of laboratory studies, ordering and review of radiographic studies, pulse oximetry, re-evaluation of patient's condition.  This critical care time did not overlap with that of any other provider or involve time for any procedures.     Valerio Shrestha MD  Pulmonary Critical Care Medicine  [facility] - ICU  DOS: 04/23/2025          [1]   Social History  Socioeconomic History    Marital status: Single   Tobacco Use    Smoking status: Every Day     Current packs/day: 0.50     Average packs/day: 0.8 packs/day for 50.3 years (40.9 ttl pk-yrs)     Types: Cigarettes     Start date: 1975     Passive exposure: Current    Smokeless tobacco: Never    Tobacco comments:     States smoke 2-3 cigarettes per day   Substance and Sexual Activity    Alcohol use: Yes     Alcohol/week: 3.0 standard drinks of alcohol     Types: 3 Cans of beer per week     Comment: socially    Drug use: Not Currently     Frequency: 1.0 times per week     Types: Marijuana     Comment: no use in over 1 year    Sexual activity: Not Currently     Partners: Female     Social Drivers of Health     Financial Resource Strain: Low Risk  (2/26/2025)    Overall Financial Resource Strain (CARDIA)     Difficulty of Paying Living Expenses: Not very hard   Food Insecurity: No Food Insecurity (2/26/2025)    Hunger Vital Sign     Worried About Running Out of Food in the Last Year: Never true     Ran Out of Food in the Last Year: Never true   Transportation Needs: No Transportation Needs (12/17/2024)    TRANSPORTATION NEEDS     Transportation  : No   Physical Activity: Inactive (12/17/2024)    Exercise Vital Sign     Days of Exercise per Week: 0 days     Minutes of Exercise per Session: 0 min   Stress: No Stress Concern Present (2/26/2025)    [employer] - Occupational Stress Questionnaire     Feeling of Stress : Not at all   Housing Stability: Low Risk  (2/26/2025)    Housing Stability Vital Sign     Unable to Pay for Housing in the Last Year: No     Homeless in the Last Year: No

## 2025-04-24 LAB
ALBUMIN SERPL-MCNC: 2.9 G/DL (ref 3.4–4.8)
ALBUMIN/GLOB SERPL: 0.9 RATIO (ref 1.1–2)
ALP SERPL-CCNC: 178 UNIT/L (ref 40–150)
ALT SERPL-CCNC: 17 UNIT/L (ref 0–55)
ANION GAP SERPL CALC-SCNC: 13 MEQ/L
AST SERPL-CCNC: 38 UNIT/L (ref 11–45)
BASOPHILS # BLD AUTO: 0 X10(3)/MCL
BASOPHILS NFR BLD AUTO: 0 %
BILIRUB SERPL-MCNC: 5.8 MG/DL
BUN SERPL-MCNC: 17.7 MG/DL (ref 8.4–25.7)
CALCIUM SERPL-MCNC: 9.1 MG/DL (ref 8.8–10)
CHLORIDE SERPL-SCNC: 97 MMOL/L (ref 98–107)
CO2 SERPL-SCNC: 27 MMOL/L (ref 23–31)
CREAT SERPL-MCNC: 1.02 MG/DL (ref 0.72–1.25)
CREAT/UREA NIT SERPL: 17
EOSINOPHIL # BLD AUTO: 0.1 X10(3)/MCL (ref 0–0.9)
EOSINOPHIL NFR BLD AUTO: 2.4 %
ERYTHROCYTE [DISTWIDTH] IN BLOOD BY AUTOMATED COUNT: 24.1 % (ref 11.5–17)
GFR SERPLBLD CREATININE-BSD FMLA CKD-EPI: >60 ML/MIN/1.73/M2
GLOBULIN SER-MCNC: 3.4 GM/DL (ref 2.4–3.5)
GLUCOSE SERPL-MCNC: 115 MG/DL (ref 82–115)
HCT VFR BLD AUTO: 27.1 % (ref 42–52)
HGB BLD-MCNC: 9.1 G/DL (ref 14–18)
HOLD SPECIMEN: NORMAL
IMM GRANULOCYTES # BLD AUTO: 0.02 X10(3)/MCL (ref 0–0.04)
IMM GRANULOCYTES NFR BLD AUTO: 0.5 %
LACTATE SERPL-SCNC: 2 MMOL/L (ref 0.5–2.2)
LACTATE SERPL-SCNC: 2.3 MMOL/L (ref 0.5–2.2)
LACTATE SERPL-SCNC: 2.3 MMOL/L (ref 0.5–2.2)
LACTATE SERPL-SCNC: 2.5 MMOL/L (ref 0.5–2.2)
LACTATE SERPL-SCNC: 4.5 MMOL/L (ref 0.5–2.2)
LYMPHOCYTES # BLD AUTO: 0.71 X10(3)/MCL (ref 0.6–4.6)
LYMPHOCYTES NFR BLD AUTO: 17.1 %
MAGNESIUM SERPL-MCNC: 1.9 MG/DL (ref 1.6–2.6)
MCH RBC QN AUTO: 24.9 PG (ref 27–31)
MCHC RBC AUTO-ENTMCNC: 33.6 G/DL (ref 33–36)
MCV RBC AUTO: 74.2 FL (ref 80–94)
MONOCYTES # BLD AUTO: 0.56 X10(3)/MCL (ref 0.1–1.3)
MONOCYTES NFR BLD AUTO: 13.5 %
NEUTROPHILS # BLD AUTO: 2.75 X10(3)/MCL (ref 2.1–9.2)
NEUTROPHILS NFR BLD AUTO: 66.5 %
NRBC BLD AUTO-RTO: 0 %
PHOSPHATE SERPL-MCNC: 1.8 MG/DL (ref 2.3–4.7)
PLATELET # BLD AUTO: 185 X10(3)/MCL (ref 130–400)
PLATELETS.RETICULATED NFR BLD AUTO: 2.2 % (ref 0.9–11.2)
PMV BLD AUTO: 9.6 FL (ref 7.4–10.4)
POCT GLUCOSE: 133 MG/DL (ref 70–110)
POTASSIUM SERPL-SCNC: 3.4 MMOL/L (ref 3.5–5.1)
PROT SERPL-MCNC: 6.3 GM/DL (ref 5.8–7.6)
RBC # BLD AUTO: 3.65 X10(6)/MCL (ref 4.7–6.1)
SODIUM SERPL-SCNC: 137 MMOL/L (ref 136–145)
WBC # BLD AUTO: 4.14 X10(3)/MCL (ref 4.5–11.5)

## 2025-04-24 PROCEDURE — C1751 CATH, INF, PER/CENT/MIDLINE: HCPCS

## 2025-04-24 PROCEDURE — 83605 ASSAY OF LACTIC ACID: CPT

## 2025-04-24 PROCEDURE — 02HV33Z INSERTION OF INFUSION DEVICE INTO SUPERIOR VENA CAVA, PERCUTANEOUS APPROACH: ICD-10-PCS | Performed by: INTERNAL MEDICINE

## 2025-04-24 PROCEDURE — 94761 N-INVAS EAR/PLS OXIMETRY MLT: CPT

## 2025-04-24 PROCEDURE — 83735 ASSAY OF MAGNESIUM: CPT

## 2025-04-24 PROCEDURE — 63600175 PHARM REV CODE 636 W HCPCS

## 2025-04-24 PROCEDURE — 25000003 PHARM REV CODE 250

## 2025-04-24 PROCEDURE — 84100 ASSAY OF PHOSPHORUS: CPT

## 2025-04-24 PROCEDURE — 20000000 HC ICU ROOM

## 2025-04-24 PROCEDURE — 99291 CRITICAL CARE FIRST HOUR: CPT | Mod: ,,, | Performed by: INTERNAL MEDICINE

## 2025-04-24 PROCEDURE — 87040 BLOOD CULTURE FOR BACTERIA: CPT

## 2025-04-24 PROCEDURE — 36415 COLL VENOUS BLD VENIPUNCTURE: CPT

## 2025-04-24 PROCEDURE — 36415 COLL VENOUS BLD VENIPUNCTURE: CPT | Performed by: STUDENT IN AN ORGANIZED HEALTH CARE EDUCATION/TRAINING PROGRAM

## 2025-04-24 PROCEDURE — 36569 INSJ PICC 5 YR+ W/O IMAGING: CPT

## 2025-04-24 PROCEDURE — 85025 COMPLETE CBC W/AUTO DIFF WBC: CPT

## 2025-04-24 PROCEDURE — 27000207 HC ISOLATION

## 2025-04-24 PROCEDURE — 80053 COMPREHEN METABOLIC PANEL: CPT

## 2025-04-24 RX ORDER — POTASSIUM CHLORIDE 20 MEQ/1
40 TABLET, EXTENDED RELEASE ORAL ONCE
Status: DISCONTINUED | OUTPATIENT
Start: 2025-04-24 | End: 2025-04-24

## 2025-04-24 RX ADMIN — SIMETHICONE 80 MG: 80 TABLET, CHEWABLE ORAL at 10:04

## 2025-04-24 RX ADMIN — PIPERACILLIN AND TAZOBACTAM 4.5 G: 4; .5 INJECTION, POWDER, LYOPHILIZED, FOR SOLUTION INTRAVENOUS; PARENTERAL at 10:04

## 2025-04-24 RX ADMIN — PANTOPRAZOLE SODIUM 40 MG: 40 TABLET, DELAYED RELEASE ORAL at 08:04

## 2025-04-24 RX ADMIN — NOREPINEPHRINE BITARTRATE 0.06 MCG/KG/MIN: 4 INJECTION, SOLUTION INTRAVENOUS at 05:04

## 2025-04-24 RX ADMIN — CLOPIDOGREL BISULFATE 75 MG: 75 TABLET, FILM COATED ORAL at 08:04

## 2025-04-24 RX ADMIN — ACETAMINOPHEN 650 MG: 325 TABLET, FILM COATED ORAL at 04:04

## 2025-04-24 RX ADMIN — RIVAROXABAN 20 MG: 10 TABLET, FILM COATED ORAL at 04:04

## 2025-04-24 RX ADMIN — POTASSIUM PHOSPHATE, MONOBASIC POTASSIUM PHOSPHATE, DIBASIC 30 MMOL: 224; 236 INJECTION, SOLUTION, CONCENTRATE INTRAVENOUS at 06:04

## 2025-04-24 RX ADMIN — FOLIC ACID 1 MG: 1 TABLET ORAL at 08:04

## 2025-04-24 RX ADMIN — PIPERACILLIN AND TAZOBACTAM 4.5 G: 4; .5 INJECTION, POWDER, LYOPHILIZED, FOR SOLUTION INTRAVENOUS; PARENTERAL at 05:04

## 2025-04-24 RX ADMIN — MUPIROCIN: 20 OINTMENT TOPICAL at 08:04

## 2025-04-24 RX ADMIN — MUPIROCIN: 20 OINTMENT TOPICAL at 09:04

## 2025-04-24 RX ADMIN — Medication 1000 UNITS: at 08:04

## 2025-04-24 RX ADMIN — NOREPINEPHRINE BITARTRATE 0.08 MCG/KG/MIN: 4 INJECTION, SOLUTION INTRAVENOUS at 09:04

## 2025-04-24 RX ADMIN — ATORVASTATIN CALCIUM 80 MG: 40 TABLET, FILM COATED ORAL at 08:04

## 2025-04-24 RX ADMIN — ASPIRIN 81 MG: 81 TABLET, COATED ORAL at 07:04

## 2025-04-24 NOTE — PROGRESS NOTES
04/24/25 0801   Missed Time Reason   Missed Treatment Reason Transfer to ICU, await clearance     Unable to eval pt yesterday d/t low BP, pt now upgraded to ICU for cardiogenic shock requiring pressors.      DC OT eval orders and await new orders to evaluate pt when appropriate per MD.

## 2025-04-24 NOTE — PLAN OF CARE
Problem: Skin Injury Risk Increased  Goal: Skin Health and Integrity  Outcome: Progressing     Problem: Adult Inpatient Plan of Care  Goal: Absence of Hospital-Acquired Illness or Injury  Outcome: Progressing  Goal: Optimal Comfort and Wellbeing  Outcome: Progressing     Problem: COPD (Chronic Obstructive Pulmonary Disease)  Goal: Optimal Chronic Illness Coping  Outcome: Progressing  Goal: Optimal Level of Functional [place]  Outcome: Progressing  Goal: Absence of Infection Signs and Symptoms  Outcome: Progressing  Goal: Improved Oral Intake  Outcome: Progressing     Problem: Heart Failure  Goal: Optimal Coping  Outcome: Progressing  Goal: Optimal Cardiac Output  Outcome: Progressing  Goal: Stable Heart Rate and Rhythm  Outcome: Progressing  Goal: Effective Breathing Pattern During Sleep  Outcome: Progressing     Problem: Acute Kidney Injury/Impairment  Goal: Fluid and Electrolyte Balance  Outcome: Progressing  Goal: Effective Renal Function  Outcome: Progressing     Problem: Fall Injury Risk  Goal: Absence of Fall and Fall-Related Injury  Outcome: Progressing

## 2025-04-24 NOTE — CARE UPDATE
Patient had been off vasopressor but then had episodes of hypotension around 2000 yesterday and was started back on Levophed. Currently on 0.08 mcg/kg/min. Remains on Dobutamine 5 mcg/kg/min. Afebrile and other vital signs stable. Patient states he is doing well. C/o mild abdominal pain 2/10, LLQ with no postprandial pain or associated nausea or vomiting. Denies any chest pain or shortness of breath. Has no other acute complaints. CBC today with leukopenia 4.14, H&H and platelet count stable. CMP with mild hypokalemia of 3.4 and hypophosphatemia of 1.8. Elevated lactic acid of 4.5 noted after clearance yesterday. Patient off diuretics and UOP of 700 ml yesterday. Fluid status net negative of 7.3L.     Physical Exam  Vitals reviewed.   Constitutional:       Appearance: He is obese.   HENT:      Head: Normocephalic and atraumatic.   Eyes:      General: No scleral icterus.     Conjunctiva/sclera: Conjunctivae normal.   Cardiovascular:      Rate and Rhythm: Normal rate. Rhythm irregular.      Heart sounds: No murmur heard.  Pulmonary:      Effort: No respiratory distress.      Breath sounds: Normal breath sounds. No wheezing, rhonchi or rales.   Abdominal:      General: Bowel sounds are normal. There is no distension.      Palpations: Abdomen is soft.      Tenderness: There is abdominal tenderness (mild on deep palpation LLQ).   Musculoskeletal:      Right lower leg: No edema.      Left lower leg: No edema.   Skin:     General: Skin is warm.   Neurological:      Mental Status: He is alert and oriented to person, place, and time.   Psychiatric:         Mood and Affect: Mood normal.         Behavior: Behavior normal.         Assessment  Cardiogenic shock - improved  Septic shock likely from ESBL E Coli UTI  Acute on chronic CHF exacerbation  Chronic persistent atrial fibrillation  Nonobstructive coronary artery disease  Nonischemic cardiomyopathy  Heart failure with with reduced ejection fraction (EF 30%); NYHA Class III  Stage C  Hx of VT arrest  Pulmonary hypertension, likely combined group II and III  PVD  ELIZABETH  COPD - 2 L supplemental oxygen dependent at home  Tobacco dependence  Microcytic anemia  Hypophosphatemia           Plan  Admit to ICU for ongoing care  Wean off Dobutamine to 5 mcg/kg/min  Continue Levophed infusion for MAP goal of 65 and wean as tolerated  Blood cultures x 2 ordered  Will start on IV Zosyn 4.5 g every 8 hours  Strict I&O and daily weight  Cardiology consulted, recs appreciated  Hold off on further IV Diuresis and GDMT  Continue with Xarelto 20 mg daily  Continue DAPT and Statin  Will replete electrolytes, Keep K >4, Phos 3 and Mg >2     DVT Prophylaxis: Xarelto  GI Prophylaxis: PPI     32 minutes of critical care was time spent personally by me on the following activities: development of treatment plan with patient or surrogate and bedside caregivers, discussions with consultants, evaluation of patient's response to treatment, examination of patient, ordering and performing treatments and interventions, ordering and review of laboratory studies, ordering and review of radiographic studies, pulse oximetry, re-evaluation of patient's condition.  This critical care time did not overlap with that of any other provider or involve time for any procedures.     Valerio Shrestha MD  Pulmonary Critical Care Medicine  [facility] - ICU  DOS: 04/23/2025

## 2025-04-24 NOTE — PROGRESS NOTES
[facility]  Cardiology Consult Note    Patient's Name: Ran Reyes Jr.  : 1955  MRN: [id number]  Consult Date: 2025    Chief Complaint  Chief Complaint   Patient presents with    Weakness     Intermittent weakness (x)2-3 weeks. Also states history of heart failure, pvd, and currently wears a Zoll Defibrillator. Auz=486 mg/dl per ems. Rates pain /10. Vss. 12 lead EKG pending.       SUBJECTIVE   HPI:  Ran Reyes Jr. is a 69 y.o. male with a PMHx of tobacco dependence, chronic obstructive pulmonary disease, hypertension, pulmonary hypertension, hyperlipidemia, chronic persistent atrial fibrillation on Xarelto, nonobstructive coronary artery disease, nonischemic cardiomyopathy, heart failure with with reduced ejection fraction (EF 30%), peripheral vascular disease s/p stenting to superficial femoral artery and right tibial artery, renée's gangrene (2024), primary open-angle glaucoma, who was admitted to [facility] ED (2025) for acute CHF exacerbation.  The patient was started on IV Lasix 80 mg daily as well as metolazone 10 mg daily in an effort to augment the diuretic effect of Lasix in the setting of suspected cardiorenal syndrome.  He was continued on this regimen for 2 days; however, patient was noted to be hypotensive on the morning of the 3rd day of admission with BP 80s/60s with systolic pressures dropping into the 70s.  Cardiology was consulted for hypotension.     He was started on a dobutamine infusion at 2.5 mcg/kg/min.  Diuretics and GDMT were held.  Despite initiation of dobutamine, the patient's blood pressure remained largely unchanged, and he became bradycardic with HR as low as 48.  On examination, he appears lethargic which is much different from his known baseline.  Lactic acid is elevated at 3.5.  Patient upgraded to the ICU for cardiogenic shock requiring vasopressor support.    Interval History:  Patient was started on Levophed, in addition to  increasing Dobutamine, yesterday afternoon following upgrade to ICU.  Initially able to be weaned off of vasopressors, but became hypotensive around 2000 and was started back on Levophed.  Lactic acid initially downtrended to 2 yesterday evening, but was noted to increase to 4.5 this morning.  Most recent lactic acid was 2.3.    This morning, he is afebrile with stable vital signs. Patient is awake and alert, and appears comfortable and much less fatigued than yesterday afternoon.  Skin is warm and well-perfused.  Appears euvolemic on exam.  He denies any chest pain or shortness of breath.  Patient has a recorded UOP of 1600 cc since yesterday morning while off of diuretics.  Clinical picture now less concerning for cardiogenic shock.  Primary team is now treating as septic shock secondary to ESBL E coli UTI.  Currently requiring Levophed 0.1mcg/kg/min.  Dobutamine weaned this morning.    Review of Systems: A 12-pt ROS was conducted and was negative unless stated in the HPI above.    Cardiac Testing:  No results found for this or any previous visit.    Results for orders placed during the hospital encounter of 12/05/24    Echo    Interpretation Summary    Left Ventricle: The left ventricle is normal in size. Normal wall thickness. There is reduced systolic function. Quantitated ejection fraction is 30%.    Right Ventricle: Mild right ventricular enlargement. Systolic function is mildly reduced.    Left Atrium: Left atrium is moderately dilated.    Right Atrium: Right atrium is moderately dilated.    Aortic Valve: The aortic valve is a trileaflet valve. There is no stenosis. Aortic valve peak velocity is 1.8 m/s. Mean gradient is 6.3 mmHg.    Mitral Valve: The mitral valve is structurally normal. The mean pressure gradient across the mitral valve is 4 mmHg at a heart rate of  bpm. There is moderate regurgitation.    Tricuspid Valve: There is mild to moderate regurgitation.    Pulmonary Artery: The estimated pulmonary  artery systolic pressure is 44 mmHg.    IVC/SVC: Intermediate venous pressure at 8 mmHg.      Results for orders placed during the hospital encounter of 08/13/24    Echo    Interpretation Summary    Left Ventricle: The left ventricle is mildly dilated. There is severely reduced systolic function. Biplane (2D) method of discs ejection fraction is 22%.    Right Ventricle: Mild right ventricular enlargement. Systolic function is mildly reduced.    Left Atrium: Left atrium is moderately dilated.    Right Atrium: Right atrium is severely dilated.    Aortic Valve: The aortic valve is a trileaflet valve. There is no stenosis. Aortic valve peak velocity is 1.43 m/s. Mean gradient is 4 mmHg.    Mitral Valve: The mitral valve is structurally normal. The mean pressure gradient across the mitral valve is 3 mmHg at a heart rate of  bpm. There is moderate to severe regurgitation.    Tricuspid Valve: There is mild to moderate regurgitation.    Pulmonic Valve: There is mild regurgitation.    Pulmonary Artery: The estimated pulmonary artery systolic pressure is 51 mmHg.    IVC/SVC: Elevated venous pressure at 15 mmHg.      Results for orders placed during the hospital encounter of 03/17/24    Echo    Interpretation Summary    Left Ventricle: The left ventricle is normal in size. Moderately increased wall thickness. There is low normal systolic function with a visually estimated ejection fraction of 50 - 55%. There is diastolic dysfunction.    Mitral Valve: There is moderate regurgitation.    No results found for this or any previous visit.     Results for orders placed during the hospital encounter of 03/17/24    Cardiac catheterization    Conclusion  The procedure log was documented by No documenter listed and verified by Adriano Montiel MD.    Procedure:  Selective coronary angiography  Moderate (Conscious) Sedation      Indication: VT arrest, known cardiomyopathy, NSTEMI  Consent: The patient was brought to the cardiac  catheterization lab. Was instructed and explained about the risk, benefit and alternatives of the procedure included but not limited to sudden cardiac death, myocardial infarction, bleeding, vascular injury, renal failure, stroke, contrast allergy, risk of conscious sedation and need for emergent bypass surgery.  the patient was agreeable to proceed.  Signed the consent form.  Access:  The patient was prepped using the usual sterile fashion.  Right radial artery  was accessed with micropuncture technique, ultrasound guidance.  An image of the ultrasound was put in the paper chart for purpose of documentation.  Sheath size:6F    Kirill test:  Verified with pulse oximetry deemed to be adequate for radial artery procedure.    Diagnostic catheters : TIG, JL 3.5    Coronary findings:    Dominance: right  Left main:  10-20% calcified distal left main disease  Left anterior descending artery:  50% calcified proximal stenosis  Circumflex artery:  Luminal irregularities  Right coronary artery:  Luminal irregularities    Access Closure:  At the end of the procedure the sheath was removed. A TR band applied to the right radial artery . Excellent hemostasis achieved.     Past Medical History:   Diagnosis Date    A-fib     Anticoagulant long-term use     Anxiety     Aortic aneurysm     Cataract     CHF (congestive heart failure)     Chronic atrial fibrillation     COPD (chronic obstructive pulmonary disease)     Coronary artery disease     Depression     HLD (hyperlipidemia)     Hypertension     Mitral regurgitation     PAD (peripheral artery disease)     Primary open angle glaucoma (POAG)      Past Surgical History:   Procedure Laterality Date    ANGIOGRAM, CORONARY, WITH LEFT HEART CATHETERIZATION N/A 03/26/2024    Procedure: Angiogram, Coronary, with Left Heart Cath;  Surgeon: Adriano Montiel MD;  Location: [facility] CATH LAB;  Service: Cardiology;  Laterality: N/A;    ATHERECTOMY OF PERIPHERAL VESSEL Left 09/12/2022    LEFT SFA  ATHERECTOMY, BALLOON ANGIOPLASTY    CATARACT EXTRACTION W/  INTRAOCULAR LENS IMPLANT Left 03/09/2023    Procedure: EXTRACTION, CATARACT, WITH IOL INSERTION;  Surgeon: Georgi Borja MD;  Location: [facility];  Service: Ophthalmology;  Laterality: Left;  19.5  mac    EGD, WITH CLOSED BIOPSY  03/22/2024    Procedure: EGD, WITH CLOSED BIOPSY;  Surgeon: Ronald Calderon MD;  Location: [facility] ENDOSCOPY;  Service: Gastroenterology;;    ESOPHAGOGASTRODUODENOSCOPY N/A 03/22/2024    Procedure: EGD;  Surgeon: Ronald Calderon MD;  Location: [facility] ENDOSCOPY;  Service: Gastroenterology;  Laterality: N/A;    Heart Stent N/A     > 10yrs. AGO    INCISION AND DRAINAGE N/A 03/18/2024    Procedure: Incision and Drainage;  Surgeon: Fahad Rivas MD;  Location: [facility];  Service: Urology;  Laterality: N/A;  I&D SCROTAL ABSCESS    INSERTION OF STENT INTO PERIPHERAL VESSEL Right 10/17/2022    RIGHT SFA ATHERECTOMY, BALLOON ANGIOPLASTY, STENT; RIGHT ANTERIOR TIBIAL ARTERY ATHERECTOMY, BALLOON ANGIOPLASTY    ORCHIECTOMY Left 03/18/2024    Procedure: ORCHIECTOMY;  Surgeon: Fahad Rivas MD;  Location: [facility];  Service: Urology;  Laterality: Left;     Social History[1]  Family History   Problem Relation Name Age of Onset    Cancer Mother      Hypertension Mother      Heart failure Father      Stroke Father      Hypertension Father      No Known Problems Sister       Home Medications:  Current Outpatient Medications   Medication Instructions    acetaminophen 650 mg, 2 times daily PRN    ALBUTEROL INHL 2 puffs, Every 6 hours PRN    albuterol-ipratropium (DUO-NEB) 2.5 mg-0.5 mg/3 mL nebulizer solution 3 mLs, Nebulization, Every 6 hours PRN, Rescue    aspirin (ECOTRIN) 81 MG EC tablet 1 tablet, Daily    atorvastatin (LIPITOR) 80 mg, Oral, Daily    BREZTRI AEROSPHERE 160-9-4.8 mcg/actuation HFAA 2 puffs, 2 times daily    cetirizine (ZYRTEC) 10 mg, Oral, Daily    clopidogreL (PLAVIX) 75 mg, Oral, Daily    folic  acid (FOLVITE) 1 mg, Oral, Daily    furosemide (LASIX) 40 mg, Oral, 2 times daily    gabapentin (NEURONTIN) 300 mg, Oral, 3 times daily    metoprolol succinate (TOPROL-XL) 12.5 mg, Oral, Daily    nicotine (NICODERM CQ) 14 mg/24 hr 1 patch, Transdermal, Daily PRN    nicotine (NICODERM CQ) 21 mg/24 hr 1 patch, Transdermal, Daily    nitrofurantoin, macrocrystal-monohydrate, (MACROBID) 100 MG capsule 100 mg, Oral, 2 times daily    olopatadine (PATANOL) 0.1 % ophthalmic solution Apply to eye.    pantoprazole (PROTONIX) 40 mg, Oral, 2 times daily    potassium chloride SA (K-DUR,KLOR-CON) 20 MEQ tablet 20 mEq, Oral, 2 times daily    rivaroxaban (XARELTO) 20 mg, Oral, Daily    sacubitriL-valsartan (ENTRESTO) 49-51 mg per tablet 1 tablet, Daily    sertraline (ZOLOFT) 100 mg, Oral, Daily    urea (CARMOL) 40 % Crea Apply topically.    varenicline tartrate (CHANTIX) 0.5 mg    vitamin D (VITAMIN D3) 1,000 Units, Daily      Current Inpatient Medications:  Scheduled:   aspirin  81 mg Oral Daily    atorvastatin  80 mg Oral Daily    clopidogreL  75 mg Oral Daily    folic acid  1 mg Oral Daily    mupirocin   Nasal BID    pantoprazole  40 mg Oral Daily    piperacillin-tazobactam (Zosyn) IV (PEDS and ADULTS) (extended infusion is not appropriate)  4.5 g Intravenous Q8H    rivaroxaban  20 mg Oral Daily    vitamin D  1,000 Units Oral Daily     Infusion:   NORepinephrine bitartrate-D5W  0-3 mcg/kg/min Intravenous Continuous 32.9 mL/hr at 04/24/25 1108 0.1 mcg/kg/min at 04/24/25 1108     PRN:    Current Facility-Administered Medications:     acetaminophen, 650 mg, Oral, Q4H PRN    dextrose 50%, 12.5 g, Intravenous, PRN    dextrose 50%, 25 g, Intravenous, PRN    glucagon (human recombinant), 1 mg, Intramuscular, PRN    glucose, 16 g, Oral, PRN    glucose, 24 g, Oral, PRN    hydrALAZINE, 10 mg, Intravenous, Q4H PRN    labetalol, 10 mg, Intravenous, Q4H PRN    melatonin, 6 mg, Oral, Nightly PRN    naloxone, 0.02 mg, Intravenous, PRN     nicotine, 1 patch, Transdermal, Daily PRN    ondansetron, 8 mg, Oral, Q8H PRN    polyethylene glycol, 17 g, Oral, Daily PRN    prochlorperazine, 5 mg, Intravenous, Q6H PRN    senna-docusate, 1 tablet, Oral, BID PRN    simethicone, 1 tablet, Oral, TID PRN    sodium chloride 0.9%, 10 mL, Intravenous, PRN  Allergies:  Review of patient's allergies indicates:  No Known Allergies    OBJECTIVE     VITAL SIGNS: 24 HR MIN & MAX Most Recent Vitals   Temp  Min: 97.5 °F (36.4 °C)  Max: 98 °F (36.7 °C)  97.6 °F (36.4 °C)   BP  Min: 59/46  Max: 114/49  (!) 82/58    Pulse  Min: 48  Max: 102  85   Resp  Min: 11  Max: 26  19    SpO2  Min: 81 %  Max: 100 %  97 %    Body mass index is 26.64 kg/m².    Physical Exam  Constitutional:       General: He is not in acute distress.     Appearance: He is ill-appearing (chronically).   HENT:      Head: Normocephalic and atraumatic.      Mouth/Throat:      Mouth: Mucous membranes are moist.      Pharynx: Oropharynx is clear.   Eyes:      Extraocular Movements: Extraocular movements intact.   Neck:      Vascular: JVD (improved) present. No carotid bruit.   Cardiovascular:      Rate and Rhythm: Normal rate. Rhythm irregular.   Pulmonary:      Effort: Pulmonary effort is normal. No respiratory distress.      Breath sounds: Normal breath sounds.   Abdominal:      General: Bowel sounds are normal. There is distension (mild).      Palpations: Abdomen is soft.      Tenderness: There is abdominal tenderness (mild tenderness to deep plapatation). There is no guarding or rebound.   Musculoskeletal:         General: Normal range of motion.      Cervical back: Normal range of motion.      Right lower leg: Edema present.      Left lower leg: Edema present.      Comments: Mild BLE   Skin:     General: Skin is warm and dry.      Capillary Refill: Capillary refill takes less than 2 seconds.   Neurological:      General: No focal deficit present.      Mental Status: He is alert and oriented to person, place,  and time.      Cranial Nerves: No cranial nerve deficit.   Psychiatric:         Mood and Affect: Mood normal.         Behavior: Behavior normal.         Thought Content: Thought content normal.         Judgment: Judgment normal.         Labs:  CBC:  Recent Labs   Lab 04/22/25  0336 04/23/25  0345 04/24/25  0114   WBC 5.82 4.20* 4.14*   HGB 8.6* 9.2* 9.1*   HCT 26.1* 27.3* 27.1*    189 185   MCV 76.3* 73.0* 74.2*   RDW 23.5* 23.9* 24.1*     BMP/CMP:  Recent Labs   Lab 04/23/25  0345 04/23/25  1203 04/24/25  0114    137 137   K 2.7* 3.2* 3.4*   CL 96* 96* 97*   CO2 28 28 27   BUN 19.8 20.7 17.7   CREATININE 1.22 1.29* 1.02   GLUCOSE 106 102 115   EGFRNORACEVR >60 >60 >60     RFTs/LFTs:  Recent Labs   Lab 04/22/25  0336 04/23/25  0345 04/23/25  1203 04/24/25  0114   CALCIUM 9.5 9.6 9.5 9.1   LABPROT 7.3 6.6  --  6.3   ALBUMIN 3.5 3.1*  --  2.9*   AST 32 36  --  38   ALT 18 18  --  17   ALKPHOS 175* 175*  --  178*   BILITOT 7.2* 6.8*  --  5.8*     Recent Labs   Lab 04/22/25  0336 04/23/25  0345 04/24/25  0114   MG 1.90 1.60 1.90   PHOS 2.9 2.3 1.8*     Cardiac Panel:  Recent Labs   Lab 04/21/25  1205   TROPONINI <0.010   BNP 2,639.5*     Lipid Panel:  Lab Results   Component Value Date    CHOL 96 08/14/2024    HDL 32 (L) 08/14/2024    LDL 49.00 (L) 08/14/2024    TRIG 73 08/14/2024     Diabetes Panel:  Lab Results   Component Value Date    HGBA1C 5.0 03/18/2024     Thyroid Panel:  Lab Results   Component Value Date    TSH 1.097 03/18/2024    [id number] 1.30 03/18/2024     Anemia Panel:  Lab Results   Component Value Date    IRON 22 (L) 04/22/2025    TIBC 297 04/22/2025    FERRITIN 51.82 04/22/2025    [id number] >2,000 (H) 04/22/2025    FOLATE 18.5 04/22/2025     Urine Drug Screening:  Lab Results   Component Value Date    AMPHETAMINES Negative 04/21/2025    BARBITURATES Negative 04/21/2025    LABBENZ Negative 04/21/2025    CANNABUR Negative 04/21/2025    COCAINEMETAB Negative 04/21/2025    FENTANYL Negative  04/21/2025    MDMA Negative 04/21/2025    OPIATESCREEN Negative 04/21/2025    PCDSOPHENCYN Negative 04/21/2025         ASSESSMENT/PLAN     Cardiogenic shock, improved  Septic shock  Acute on chronic CHF exacerbation  HFrEF (EF 30%); NYHA Class III, Stage C  Chronic persistent atrial fibrillation   Nonobstructive coronary artery disease   Nonischemic cardiomyopathy   Pulmonary hypertension, 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  Agree with treating as septic shock.  Blood cultures x2 ordered.  Antibiotics per primary.   - Continue holding Entresto and Metoprolol succinate for now as patient is still requiring vasopressor support.  - Continue holding diuretics for now. The patient is net -8.2 L with a recorded weight loss of -22 lbs since admission.  - Continue Xarelto 20 mg daily  - Continue DAPT and high-intensity statin  - Jardiance contraindicated due to patient's history of Claudine's gangrene  - Continue to monitor electrolytes.  Keep K >4 and Mg >2.  - Strict I&O. Daily standing weights.       Further recommendations pending rounds with Cardiology attending.      Rima Ibarra MD  [facility] Internal Medicine, PGY-1  [facility] Cardiology   04/24/2025             [1]   Social History  Tobacco Use    Smoking status: Every Day     Current packs/day: 0.50     Average packs/day: 0.8 packs/day for 50.3 years (40.9 ttl pk-yrs)     Types: Cigarettes     Start date: 1975     Passive exposure: Current    Smokeless tobacco: Never    Tobacco comments:     States smoke 2-3 cigarettes per day   Substance Use Topics    Alcohol use: Yes     Alcohol/week: 3.0 standard drinks of alcohol     Types: 3 Cans of beer per week     Comment: socially    Drug use: Not Currently     Frequency: 1.0 times per week     Types: Marijuana     Comment: no use in over 1 year

## 2025-04-24 NOTE — PROCEDURES
Ran Reyes Jr. is a 69 y.o. male patient.    Temp: 97.7 °F (36.5 °C) (04/24/25 0701)  Pulse: 92 (04/24/25 0900)  Resp: 17 (04/24/25 0900)  BP: 100/75 (04/24/25 0900)  SpO2: 97 % (04/24/25 0900)  Weight: 89.1 kg (196 lb 6.9 oz) (04/24/25 0555)  Height: 6' (182.9 cm) (04/21/25 1709)    PICC  Date/Time: 4/24/2025 9:42 AM  Performed by: David Steele RN  Consent Done: Yes  Time out: Immediately prior to procedure a time out was called to verify the correct patient, procedure, equipment, support staff and site/side marked as required  Indications: med administration  Preparation: skin prepped with ChloraPrep  Skin prep agent dried: skin prep agent completely dried prior to procedure  Sterile barriers: all five maximum sterile barriers used - cap, mask, sterile gown, sterile gloves, and large sterile sheet  Hand hygiene: hand hygiene performed prior to central venous catheter insertion  Location details: right brachial  Catheter type: triple lumen  Catheter size: 5 Fr  Catheter Length: 37cm    Number of attempts: 1  Post-procedure: blood return through all ports and sterile dressing applied    Assessment: placement verified by x-ray        David Steele Rn  4/24/2025

## 2025-04-25 LAB
ALBUMIN SERPL-MCNC: 2.9 G/DL (ref 3.4–4.8)
ALBUMIN/GLOB SERPL: 0.9 RATIO (ref 1.1–2)
ALP SERPL-CCNC: 188 UNIT/L (ref 40–150)
ALT SERPL-CCNC: 21 UNIT/L (ref 0–55)
ANION GAP SERPL CALC-SCNC: 11 MEQ/L
ANION GAP SERPL CALC-SCNC: 11 MEQ/L
AST SERPL-CCNC: 50 UNIT/L (ref 11–45)
BASOPHILS # BLD AUTO: 0.02 X10(3)/MCL
BASOPHILS NFR BLD AUTO: 0.5 %
BILIRUB SERPL-MCNC: 5.2 MG/DL
BUN SERPL-MCNC: 12.7 MG/DL (ref 8.4–25.7)
BUN SERPL-MCNC: 13 MG/DL (ref 8.4–25.7)
CALCIUM SERPL-MCNC: 8.9 MG/DL (ref 8.8–10)
CALCIUM SERPL-MCNC: 9.3 MG/DL (ref 8.8–10)
CHLORIDE SERPL-SCNC: 92 MMOL/L (ref 98–107)
CHLORIDE SERPL-SCNC: 93 MMOL/L (ref 98–107)
CO2 SERPL-SCNC: 31 MMOL/L (ref 23–31)
CO2 SERPL-SCNC: 32 MMOL/L (ref 23–31)
CREAT SERPL-MCNC: 0.76 MG/DL (ref 0.72–1.25)
CREAT SERPL-MCNC: 0.91 MG/DL (ref 0.72–1.25)
CREAT/UREA NIT SERPL: 14
CREAT/UREA NIT SERPL: 17
EOSINOPHIL # BLD AUTO: 0.19 X10(3)/MCL (ref 0–0.9)
EOSINOPHIL NFR BLD AUTO: 4.5 %
ERYTHROCYTE [DISTWIDTH] IN BLOOD BY AUTOMATED COUNT: 24 % (ref 11.5–17)
GFR SERPLBLD CREATININE-BSD FMLA CKD-EPI: >60 ML/MIN/1.73/M2
GFR SERPLBLD CREATININE-BSD FMLA CKD-EPI: >60 ML/MIN/1.73/M2
GLOBULIN SER-MCNC: 3.4 GM/DL (ref 2.4–3.5)
GLUCOSE SERPL-MCNC: 102 MG/DL (ref 82–115)
GLUCOSE SERPL-MCNC: 142 MG/DL (ref 82–115)
HCT VFR BLD AUTO: 28.4 % (ref 42–52)
HGB BLD-MCNC: 9.5 G/DL (ref 14–18)
IMM GRANULOCYTES # BLD AUTO: 0.01 X10(3)/MCL (ref 0–0.04)
IMM GRANULOCYTES NFR BLD AUTO: 0.2 %
LACTATE SERPL-SCNC: 1 MMOL/L (ref 0.5–2.2)
LYMPHOCYTES # BLD AUTO: 0.54 X10(3)/MCL (ref 0.6–4.6)
LYMPHOCYTES NFR BLD AUTO: 12.7 %
MAGNESIUM SERPL-MCNC: 1.5 MG/DL (ref 1.6–2.6)
MCH RBC QN AUTO: 24.6 PG (ref 27–31)
MCHC RBC AUTO-ENTMCNC: 33.5 G/DL (ref 33–36)
MCV RBC AUTO: 73.6 FL (ref 80–94)
MONOCYTES # BLD AUTO: 0.54 X10(3)/MCL (ref 0.1–1.3)
MONOCYTES NFR BLD AUTO: 12.7 %
NEUTROPHILS # BLD AUTO: 2.95 X10(3)/MCL (ref 2.1–9.2)
NEUTROPHILS NFR BLD AUTO: 69.4 %
NRBC BLD AUTO-RTO: 0 %
OHS QRS DURATION: 106 MS
OHS QTC CALCULATION: 523 MS
PHOSPHATE SERPL-MCNC: 2.8 MG/DL (ref 2.3–4.7)
PLATELET # BLD AUTO: 210 X10(3)/MCL (ref 130–400)
PLATELETS.RETICULATED NFR BLD AUTO: 2.1 % (ref 0.9–11.2)
PMV BLD AUTO: 10.2 FL (ref 7.4–10.4)
POCT GLUCOSE: 106 MG/DL (ref 70–110)
POCT GLUCOSE: >500 MG/DL (ref 70–110)
POTASSIUM SERPL-SCNC: 2.9 MMOL/L (ref 3.5–5.1)
POTASSIUM SERPL-SCNC: 3.1 MMOL/L (ref 3.5–5.1)
PROT SERPL-MCNC: 6.3 GM/DL (ref 5.8–7.6)
RBC # BLD AUTO: 3.86 X10(6)/MCL (ref 4.7–6.1)
SODIUM SERPL-SCNC: 135 MMOL/L (ref 136–145)
SODIUM SERPL-SCNC: 135 MMOL/L (ref 136–145)
WBC # BLD AUTO: 4.25 X10(3)/MCL (ref 4.5–11.5)

## 2025-04-25 PROCEDURE — 63600175 PHARM REV CODE 636 W HCPCS

## 2025-04-25 PROCEDURE — 27000207 HC ISOLATION

## 2025-04-25 PROCEDURE — 36415 COLL VENOUS BLD VENIPUNCTURE: CPT

## 2025-04-25 PROCEDURE — 94761 N-INVAS EAR/PLS OXIMETRY MLT: CPT

## 2025-04-25 PROCEDURE — 25000003 PHARM REV CODE 250

## 2025-04-25 PROCEDURE — 20000000 HC ICU ROOM

## 2025-04-25 PROCEDURE — 85025 COMPLETE CBC W/AUTO DIFF WBC: CPT

## 2025-04-25 PROCEDURE — 83605 ASSAY OF LACTIC ACID: CPT

## 2025-04-25 PROCEDURE — 84100 ASSAY OF PHOSPHORUS: CPT

## 2025-04-25 PROCEDURE — 80053 COMPREHEN METABOLIC PANEL: CPT

## 2025-04-25 PROCEDURE — 83735 ASSAY OF MAGNESIUM: CPT

## 2025-04-25 RX ORDER — POTASSIUM CHLORIDE 20 MEQ/1
60 TABLET, EXTENDED RELEASE ORAL ONCE
Status: COMPLETED | OUTPATIENT
Start: 2025-04-25 | End: 2025-04-25

## 2025-04-25 RX ORDER — POTASSIUM CHLORIDE 20 MEQ/1
40 TABLET, EXTENDED RELEASE ORAL ONCE
Status: COMPLETED | OUTPATIENT
Start: 2025-04-25 | End: 2025-04-25

## 2025-04-25 RX ORDER — MAGNESIUM SULFATE HEPTAHYDRATE 40 MG/ML
2 INJECTION, SOLUTION INTRAVENOUS ONCE
Status: COMPLETED | OUTPATIENT
Start: 2025-04-25 | End: 2025-04-25

## 2025-04-25 RX ORDER — LORAZEPAM 1 MG/1
2 TABLET ORAL EVERY 4 HOURS PRN
Status: DISCONTINUED | OUTPATIENT
Start: 2025-04-25 | End: 2025-05-06

## 2025-04-25 RX ADMIN — POTASSIUM CHLORIDE 40 MEQ: 1500 TABLET, EXTENDED RELEASE ORAL at 06:04

## 2025-04-25 RX ADMIN — ATORVASTATIN CALCIUM 80 MG: 40 TABLET, FILM COATED ORAL at 09:04

## 2025-04-25 RX ADMIN — MUPIROCIN: 20 OINTMENT TOPICAL at 09:04

## 2025-04-25 RX ADMIN — POTASSIUM CHLORIDE 60 MEQ: 1500 TABLET, EXTENDED RELEASE ORAL at 01:04

## 2025-04-25 RX ADMIN — FOLIC ACID 1 MG: 1 TABLET ORAL at 09:04

## 2025-04-25 RX ADMIN — PIPERACILLIN AND TAZOBACTAM 4.5 G: 4; .5 INJECTION, POWDER, LYOPHILIZED, FOR SOLUTION INTRAVENOUS; PARENTERAL at 09:04

## 2025-04-25 RX ADMIN — MAGNESIUM SULFATE HEPTAHYDRATE 2 G: 40 INJECTION, SOLUTION INTRAVENOUS at 08:04

## 2025-04-25 RX ADMIN — PIPERACILLIN AND TAZOBACTAM 4.5 G: 4; .5 INJECTION, POWDER, LYOPHILIZED, FOR SOLUTION INTRAVENOUS; PARENTERAL at 06:04

## 2025-04-25 RX ADMIN — PANTOPRAZOLE SODIUM 40 MG: 40 TABLET, DELAYED RELEASE ORAL at 09:04

## 2025-04-25 RX ADMIN — CLOPIDOGREL BISULFATE 75 MG: 75 TABLET, FILM COATED ORAL at 09:04

## 2025-04-25 RX ADMIN — Medication 1000 UNITS: at 09:04

## 2025-04-25 RX ADMIN — RIVAROXABAN 20 MG: 10 TABLET, FILM COATED ORAL at 06:04

## 2025-04-25 RX ADMIN — NOREPINEPHRINE BITARTRATE 0.03 MCG/KG/MIN: 4 INJECTION, SOLUTION INTRAVENOUS at 10:04

## 2025-04-25 RX ADMIN — PIPERACILLIN AND TAZOBACTAM 4.5 G: 4; .5 INJECTION, POWDER, LYOPHILIZED, FOR SOLUTION INTRAVENOUS; PARENTERAL at 01:04

## 2025-04-25 RX ADMIN — MUPIROCIN: 20 OINTMENT TOPICAL at 08:04

## 2025-04-25 RX ADMIN — MAGNESIUM SULFATE HEPTAHYDRATE 2 G: 40 INJECTION, SOLUTION INTRAVENOUS at 06:04

## 2025-04-25 RX ADMIN — ASPIRIN 81 MG: 81 TABLET, COATED ORAL at 09:04

## 2025-04-25 NOTE — PROGRESS NOTES
[facility] - ICU  Pulmonary Critical Care Note    Patient Name: Ran Reyes Jr.  MRN: [id number]  Admission Date: 4/21/2025  Hospital Length of Stay: 3 days  Code Status: Full Code  Attending Provider: Lennox Vinson MD  Primary Care Provider: Abiodun Recinos DO     Subjective:     HPI:   Ran Reyes Jr. is a 69 y.o.  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 Patient reports he can only ambulate about 20-30 feet before having to stop due to claudication pain which is chronic and unchanged compared to baseline. Since running out of his diuretic medications approximately 5 days ago he has noted progressively worsening lower extremity swelling causing leg heaviness and weakness exacerbating difficulty ambulating.  He also notes acute on chronic cough productive of a brownish sputum without hemoptysis.  Denies fever, chills, sweats.  Denies chest pain.  Endorses shortness of breath at rest and with ambulation but unchanged compared to baseline.  Denies abdominal pain, nausea, vomiting, constipation, diarrhea.  Denies increased or decreased urinary frequency, dysuria, or hematuria.  Sleeps with 2 pillows at night due to baseline orthopnea. Has not had to increase number of pillows or change sleeping habits. Wears 2L NC home oxygen at baseline for hx of COPD. Denies having increased oxygen requirements prior to ED presentation.     ED course:  Vital signs stable.  Oxygenation stable on 2 L  nasal cannula.  CBC shows microcytic anemic (HGB 9.0; MCV 77).  CMP shows hyponatremia (Na 128), acidemia (CO2 14), elevated renal indices (BUN 25; creatinine 1.93), elevated alkaline phosphatase (). Troponin WNL.  BNP 26 39.5.  UDS negative.  EKG showed atrial fibrillation with PVCs.  Chest x-ray difficult to interpret due to overlying medical devices however appears to show cardiomegaly with bilateral pulmonary vascular congestion with question of bilateral pleural effusions. He was admitted for acute CHF exacerbation and cardiorenal syndrome.     Hospital Course/Significant events:  4/23/25: Admit to ICU    24 Hour Interval History:  NAEON. Patient states he feels stronger than before. Afebrile and remains on room air. He has no acute complaints. Nurse reports, patient had one bowel movement this morning, liquid with some mucous. Dobutamine was discontinued yesterday. Levophed weaned down to 0.04 mcg/kg/min. CBC with mildly improved leukopenia, stable microcytic anemia and normal platelet count. CMP with mild hyponatremia 135, hypokalemia of 2.9, hypochloremia and elevated bicarb of 32. Hypomagnesemia noted at 1.50. Patient off diuretics but had about 3.250 L urine output yesterday. Now net negative 9.3L.     Past Medical History:   Diagnosis Date    A-fib     Anticoagulant long-term use     Anxiety     Aortic aneurysm     Cataract     CHF (congestive heart failure)     Chronic atrial fibrillation     COPD (chronic obstructive pulmonary disease)     Coronary artery disease     Depression     HLD (hyperlipidemia)     Hypertension     Mitral regurgitation     PAD (peripheral artery disease)     Primary open angle glaucoma (POAG)        Past Surgical History:   Procedure Laterality Date    ANGIOGRAM, CORONARY, WITH LEFT HEART CATHETERIZATION N/A 03/26/2024    Procedure: Angiogram, Coronary, with Left Heart Cath;  Surgeon: Adriano Montiel MD;  Location: [facility] CATH LAB;  Service: Cardiology;  Laterality: N/A;     ATHERECTOMY OF PERIPHERAL VESSEL Left 09/12/2022    LEFT SFA ATHERECTOMY, BALLOON ANGIOPLASTY    CATARACT EXTRACTION W/  INTRAOCULAR LENS IMPLANT Left 03/09/2023    Procedure: EXTRACTION, CATARACT, WITH IOL INSERTION;  Surgeon: Georgi Borja MD;  Location: [facility];  Service: Ophthalmology;  Laterality: Left;  19.5  mac    EGD, WITH CLOSED BIOPSY  03/22/2024    Procedure: EGD, WITH CLOSED BIOPSY;  Surgeon: Ronald Calderon MD;  Location: [facility] ENDOSCOPY;  Service: Gastroenterology;;    ESOPHAGOGASTRODUODENOSCOPY N/A 03/22/2024    Procedure: EGD;  Surgeon: Ronald Calderon MD;  Location: [facility] ENDOSCOPY;  Service: Gastroenterology;  Laterality: N/A;    Heart Stent N/A     > 10yrs. AGO    INCISION AND DRAINAGE N/A 03/18/2024    Procedure: Incision and Drainage;  Surgeon: Fahad Rivas MD;  Location: [facility];  Service: Urology;  Laterality: N/A;  I&D SCROTAL ABSCESS    INSERTION OF STENT INTO PERIPHERAL VESSEL Right 10/17/2022    RIGHT SFA ATHERECTOMY, BALLOON ANGIOPLASTY, STENT; RIGHT ANTERIOR TIBIAL ARTERY ATHERECTOMY, BALLOON ANGIOPLASTY    ORCHIECTOMY Left 03/18/2024    Procedure: ORCHIECTOMY;  Surgeon: Fahad Rivas MD;  Location: [facility];  Service: Urology;  Laterality: Left;       Social History[1]        Current Outpatient Medications   Medication Instructions    acetaminophen 650 mg, 2 times daily PRN    ALBUTEROL INHL 2 puffs, Every 6 hours PRN    albuterol-ipratropium (DUO-NEB) 2.5 mg-0.5 mg/3 mL nebulizer solution 3 mLs, Nebulization, Every 6 hours PRN, Rescue    aspirin (ECOTRIN) 81 MG EC tablet 1 tablet, Daily    atorvastatin (LIPITOR) 80 mg, Oral, Daily    BREZTRI AEROSPHERE 160-9-4.8 mcg/actuation HFAA 2 puffs, 2 times daily    cetirizine (ZYRTEC) 10 mg, Oral, Daily    clopidogreL (PLAVIX) 75 mg, Oral, Daily    folic acid (FOLVITE) 1 mg, Oral, Daily    furosemide (LASIX) 40 mg, Oral, 2 times daily    gabapentin (NEURONTIN) 300 mg, Oral, 3 times daily     metoprolol succinate (TOPROL-XL) 12.5 mg, Oral, Daily    nicotine (NICODERM CQ) 14 mg/24 hr 1 patch, Transdermal, Daily PRN    nicotine (NICODERM CQ) 21 mg/24 hr 1 patch, Transdermal, Daily    nitrofurantoin, macrocrystal-monohydrate, (MACROBID) 100 MG capsule 100 mg, Oral, 2 times daily    olopatadine (PATANOL) 0.1 % ophthalmic solution Apply to eye.    pantoprazole (PROTONIX) 40 mg, Oral, 2 times daily    potassium chloride SA (K-DUR,KLOR-CON) 20 MEQ tablet 20 mEq, Oral, 2 times daily    rivaroxaban (XARELTO) 20 mg, Oral, Daily    sacubitriL-valsartan (ENTRESTO) 49-51 mg per tablet 1 tablet, Daily    sertraline (ZOLOFT) 100 mg, Oral, Daily    urea (CARMOL) 40 % Crea Apply topically.    varenicline tartrate (CHANTIX) 0.5 mg    vitamin D (VITAMIN D3) 1,000 Units, Daily       Review of patient's allergies indicates:  No Known Allergies     Current Inpatient Medications   aspirin  81 mg Oral Daily    atorvastatin  80 mg Oral Daily    clopidogreL  75 mg Oral Daily    folic acid  1 mg Oral Daily    magnesium sulfate 2 g IVPB  2 g Intravenous Once    Followed by    magnesium sulfate 2 g IVPB  2 g Intravenous Once    mupirocin   Nasal BID    pantoprazole  40 mg Oral Daily    piperacillin-tazobactam (Zosyn) IV (PEDS and ADULTS) (extended infusion is not appropriate)  4.5 g Intravenous Q8H    rivaroxaban  20 mg Oral Daily    vitamin D  1,000 Units Oral Daily       Current Intravenous Infusions   NORepinephrine bitartrate-D5W  0-3 mcg/kg/min Intravenous Continuous 13.2 mL/hr at 04/25/25 0247 0.04 mcg/kg/min at 04/25/25 0247         Review of Systems   Constitutional:  Negative for fever.   Eyes:  Negative for blurred vision.   Respiratory:  Negative for cough, shortness of breath and wheezing.    Cardiovascular:  Negative for chest pain, palpitations and leg swelling.   Gastrointestinal:  Negative for abdominal pain, constipation, diarrhea, nausea and vomiting.   Genitourinary:  Negative for dysuria, frequency and urgency.    Musculoskeletal:  Negative for back pain.   Neurological:  Negative for dizziness, speech change, focal weakness, weakness and headaches.          Objective:       Intake/Output Summary (Last 24 hours) at 4/25/2025 0653  Last data filed at 4/25/2025 0612  Gross per 24 hour   Intake 1238.47 ml   Output 3250 ml   Net -2011.53 ml         Vital Signs (Most Recent):  Temp: 98.5 °F (36.9 °C) (04/25/25 0400)  Pulse: 76 (04/25/25 0645)  Resp: (!) 21 (04/25/25 0645)  BP: 93/63 (04/25/25 0645)  SpO2: (!) 93 % (04/25/25 0645)  Body mass index is 26.64 kg/m².  Weight: 89.1 kg (196 lb 6.9 oz) Vital Signs (24h Range):  Temp:  [97.5 °F (36.4 °C)-98.5 °F (36.9 °C)] 98.5 °F (36.9 °C)  Pulse:  [] 76  Resp:  [12-31] 21  SpO2:  [87 %-100 %] 93 %  BP: ()/(54-88) 93/63     Physical Exam  Vitals reviewed.   Constitutional:       General: He is not in acute distress.     Appearance: He is not ill-appearing.   HENT:      Head: Normocephalic and atraumatic.      Right Ear: External ear normal.      Left Ear: External ear normal.   Eyes:      General: No scleral icterus.     Pupils: Pupils are equal, round, and reactive to light.   Cardiovascular:      Rate and Rhythm: Normal rate. Rhythm irregular.      Pulses: Normal pulses.      Heart sounds: Normal heart sounds. No murmur heard.     No friction rub. No gallop.   Pulmonary:      Effort: Pulmonary effort is normal. No respiratory distress.      Breath sounds: No stridor. No wheezing, rhonchi or rales.   Abdominal:      General: Bowel sounds are normal. There is no distension.      Palpations: Abdomen is soft.      Tenderness: There is abdominal tenderness (LLQ to deep palpation). There is no guarding.   Musculoskeletal:         General: Normal range of motion.      Right lower leg: No edema.      Left lower leg: No edema.   Skin:     General: Skin is warm and dry.      Coloration: Skin is not jaundiced.      Findings: No bruising, erythema or rash.   Neurological:      Mental  Status: He is alert.      Comments: AAO to person, place, time, and situation; CN II-XII grossly intact   Psychiatric:         Mood and Affect: Mood normal.         Behavior: Behavior normal.           Lines/Drains/Airways       Peripherally Inserted Central Catheter Line  Duration             PICC Triple Lumen 04/24/25 0942 right brachial <1 day              Drain  Duration             Male External Urinary Catheter 04/23/25 2310 1 day              Peripheral Intravenous Line  Duration                  Peripheral IV - Single Lumen 20 G Left;Posterior Hand -- days         Peripheral IV - Single Lumen 04/22/25 0000 20 G Left;Posterior Forearm 3 days                    Significant Labs:    Lab Results   Component Value Date    WBC 4.25 (L) 04/25/2025    HGB 9.5 (L) 04/25/2025    HCT 28.4 (L) 04/25/2025    MCV 73.6 (L) 04/25/2025     04/25/2025           BMP  Lab Results   Component Value Date     (L) 04/25/2025    K 2.9 (L) 04/25/2025    CO2 32 (H) 04/25/2025    BUN 13.0 04/25/2025    CREATININE 0.76 04/25/2025    CALCIUM 8.9 04/25/2025    AGAP 11.0 04/25/2025    EGFRNONAA 88 (L) 12/06/2021         ABG  Recent Labs   Lab 04/21/25  1739   PH 7.280*   PO2 31.0   PCO2 39.0*   HCO3 18.3   POCBASEDEF -7.80       Mechanical Ventilation Support:  Oxygen Concentration (%): 28 (04/22/25 0739)      Significant Imaging:  I have reviewed the pertinent imaging within the past 24 hours.        Assessment/Plan:     Assessment  Cardiogenic shock - improved  Septic shock likely from ESBL E Coli UTI  Acute on chronic CHF exacerbation  Chronic persistent atrial fibrillation  Nonobstructive coronary artery disease  Nonischemic cardiomyopathy  Heart failure with with reduced ejection fraction (EF 30%); NYHA Class III Stage C  Hx of VT arrest  Pulmonary hypertension, likely combined group II and III  PVD  ELIZABETH  COPD - 2 L supplemental oxygen dependent at home  Tobacco dependence  Microcytic anemia  Dyselectrolytemia          Plan  Continue ongoing care in the ICU  Dobutamine has been discontinued. Continue with Levophed and wean as tolerated for MAP goal of 65  Strict I&O and daily weight  Cardiology consulted, recs appreciated  Hold off on further IV Diuresis and GDMT  Follow up on blood culture x 2  Continue with IV Zosyn   Continue with Xarelto 20 mg daily  Continue DAPT and Statin  Will replete electrolytes, Keep K >4, Phos 3 and Mg >2     DVT Prophylaxis: Xarelto  GI Prophylaxis: PPI     32 minutes of critical care was time spent personally by me on the following activities: development of treatment plan with patient or surrogate and bedside caregivers, discussions with consultants, evaluation of patient's response to treatment, examination of patient, ordering and performing treatments and interventions, ordering and review of laboratory studies, ordering and review of radiographic studies, pulse oximetry, re-evaluation of patient's condition.  This critical care time did not overlap with that of any other provider or involve time for any procedures.     Valerio Shrestha MD  Pulmonary Critical Care Medicine  [facility] - ICU  DOS: 04/25/2025          [1]   Social History  Socioeconomic History    Marital status: Single   Tobacco Use    Smoking status: Every Day     Current packs/day: 0.50     Average packs/day: 0.8 packs/day for 50.3 years (40.9 ttl pk-yrs)     Types: Cigarettes     Start date: 1975     Passive exposure: Current    Smokeless tobacco: Never    Tobacco comments:     States smoke 2-3 cigarettes per day   Substance and Sexual Activity    Alcohol use: Yes     Alcohol/week: 3.0 standard drinks of alcohol     Types: 3 Cans of beer per week     Comment: socially    Drug use: Not Currently     Frequency: 1.0 times per week     Types: Marijuana     Comment: no use in over 1 year    Sexual activity: Not Currently     Partners: Female     Social Drivers of Health     Financial Resource Strain: Low Risk   (4/23/2025)    Overall Financial Resource Strain (CARDIA)     Difficulty of Paying Living Expenses: Not hard at all   Food Insecurity: No Food Insecurity (4/23/2025)    Hunger Vital Sign     Worried About Running Out of Food in the Last Year: Never true     Ran Out of Food in the Last Year: Never true   Transportation Needs: No Transportation Needs (4/23/2025)    PRAPARE - Transportation     Lack of Transportation (Medical): No     Lack of Transportation (Non-Medical): No   Physical Activity: Inactive (12/17/2024)    Exercise Vital Sign     Days of Exercise per Week: 0 days     Minutes of Exercise per Session: 0 min   Stress: No Stress Concern Present (4/23/2025)    [employer] - Occupational Stress Questionnaire     Feeling of Stress : Not at all   Housing Stability: Low Risk  (4/23/2025)    Housing Stability Vital Sign     Unable to Pay for Housing in the Last Year: No     Homeless in the Last Year: No

## 2025-04-25 NOTE — PROGRESS NOTES
[facility]  Cardiology Consult Note    Patient's Name: Ran Reyes Jr.  : 1955  MRN: [id number]  Consult Date: 2025    Chief Complaint  Chief Complaint   Patient presents with    Weakness     Intermittent weakness (x)2-3 weeks. Also states history of heart failure, pvd, and currently wears a Zoll Defibrillator. Ksf=409 mg/dl per ems. Rates pain /10. Vss. 12 lead EKG pending.       SUBJECTIVE   HPI:  Ran Reyes Jr. is a 69 y.o. male with a PMHx of tobacco dependence, chronic obstructive pulmonary disease, hypertension, pulmonary hypertension, hyperlipidemia, chronic persistent atrial fibrillation on Xarelto, nonobstructive coronary artery disease, nonischemic cardiomyopathy, heart failure with with reduced ejection fraction (EF 30%), peripheral vascular disease s/p stenting to superficial femoral artery and right tibial artery, renée's gangrene (2024), primary open-angle glaucoma, who was admitted to [facility] ED (2025) for acute CHF exacerbation.  The patient was started on IV Lasix 80 mg daily as well as metolazone 10 mg daily in an effort to augment the diuretic effect of Lasix in the setting of suspected cardiorenal syndrome.  He was continued on this regimen for 2 days; however, patient was noted to be hypotensive on the morning of the 3rd day of admission with BP 80s/60s with systolic pressures dropping into the 70s.  Cardiology was consulted for hypotension.     He was started on a dobutamine infusion at 2.5 mcg/kg/min.  Diuretics and GDMT were held.  Despite initiation of dobutamine, the patient's blood pressure remained largely unchanged, and he became bradycardic with HR as low as 48.  On examination, he appears lethargic which is much different from his known baseline.  Lactic acid is elevated at 3.5.  Patient upgraded to the ICU for cardiogenic shock requiring vasopressor support.    Interval History:  Patient had UOP of 3.3 L yesterday despite  diuretic holiday; now net -9.3 L. CMP this morning notable for hypokalemia 2.9, hypochloremia 92, and metabolic alkalosis with CO2 32. Hypomagnesemia 1.5. Renal function improved with BUN/Cr 13/0.76. Lactic acid now 1. He remains in the ICU requiring Levophed 0.02-0.04 mcg/kg/min, although hemodynamics overall continue to improve. Dobutamine was discontinued yesterday. He is afebrile and breathing comfortably on RA. Endorses chronic orthopnea, but has no acute complaints. Continued on Zosyn for treatment of ESBL E. Coli UTI. BC pending.     Review of Systems: A 12-pt ROS was conducted and was negative unless stated in the HPI above.    Cardiac Testing:  No results found for this or any previous visit.    Results for orders placed during the hospital encounter of 12/05/24    Echo    Interpretation Summary    Left Ventricle: The left ventricle is normal in size. Normal wall thickness. There is reduced systolic function. Quantitated ejection fraction is 30%.    Right Ventricle: Mild right ventricular enlargement. Systolic function is mildly reduced.    Left Atrium: Left atrium is moderately dilated.    Right Atrium: Right atrium is moderately dilated.    Aortic Valve: The aortic valve is a trileaflet valve. There is no stenosis. Aortic valve peak velocity is 1.8 m/s. Mean gradient is 6.3 mmHg.    Mitral Valve: The mitral valve is structurally normal. The mean pressure gradient across the mitral valve is 4 mmHg at a heart rate of  bpm. There is moderate regurgitation.    Tricuspid Valve: There is mild to moderate regurgitation.    Pulmonary Artery: The estimated pulmonary artery systolic pressure is 44 mmHg.    IVC/SVC: Intermediate venous pressure at 8 mmHg.      Results for orders placed during the hospital encounter of 08/13/24    Echo    Interpretation Summary    Left Ventricle: The left ventricle is mildly dilated. There is severely reduced systolic function. Biplane (2D) method of discs ejection fraction is 22%.     Right Ventricle: Mild right ventricular enlargement. Systolic function is mildly reduced.    Left Atrium: Left atrium is moderately dilated.    Right Atrium: Right atrium is severely dilated.    Aortic Valve: The aortic valve is a trileaflet valve. There is no stenosis. Aortic valve peak velocity is 1.43 m/s. Mean gradient is 4 mmHg.    Mitral Valve: The mitral valve is structurally normal. The mean pressure gradient across the mitral valve is 3 mmHg at a heart rate of  bpm. There is moderate to severe regurgitation.    Tricuspid Valve: There is mild to moderate regurgitation.    Pulmonic Valve: There is mild regurgitation.    Pulmonary Artery: The estimated pulmonary artery systolic pressure is 51 mmHg.    IVC/SVC: Elevated venous pressure at 15 mmHg.      Results for orders placed during the hospital encounter of 03/17/24    Echo    Interpretation Summary    Left Ventricle: The left ventricle is normal in size. Moderately increased wall thickness. There is low normal systolic function with a visually estimated ejection fraction of 50 - 55%. There is diastolic dysfunction.    Mitral Valve: There is moderate regurgitation.    No results found for this or any previous visit.     Results for orders placed during the hospital encounter of 03/17/24    Cardiac catheterization    Conclusion  The procedure log was documented by No documenter listed and verified by Adriano Montiel MD.    Procedure:  Selective coronary angiography  Moderate (Conscious) Sedation      Indication: VT arrest, known cardiomyopathy, NSTEMI  Consent: The patient was brought to the cardiac catheterization lab. Was instructed and explained about the risk, benefit and alternatives of the procedure included but not limited to sudden cardiac death, myocardial infarction, bleeding, vascular injury, renal failure, stroke, contrast allergy, risk of conscious sedation and need for emergent bypass surgery.  the patient was agreeable to proceed.  Signed the  consent form.  Access:  The patient was prepped using the usual sterile fashion.  Right radial artery  was accessed with micropuncture technique, ultrasound guidance.  An image of the ultrasound was put in the paper chart for purpose of documentation.  Sheath size:6F    Kirill test:  Verified with pulse oximetry deemed to be adequate for radial artery procedure.    Diagnostic catheters : TIG, JL 3.5    Coronary findings:    Dominance: right  Left main:  10-20% calcified distal left main disease  Left anterior descending artery:  50% calcified proximal stenosis  Circumflex artery:  Luminal irregularities  Right coronary artery:  Luminal irregularities    Access Closure:  At the end of the procedure the sheath was removed. A TR band applied to the right radial artery . Excellent hemostasis achieved.     Past Medical History:   Diagnosis Date    A-fib     Anticoagulant long-term use     Anxiety     Aortic aneurysm     Cataract     CHF (congestive heart failure)     Chronic atrial fibrillation     COPD (chronic obstructive pulmonary disease)     Coronary artery disease     Depression     HLD (hyperlipidemia)     Hypertension     Mitral regurgitation     PAD (peripheral artery disease)     Primary open angle glaucoma (POAG)      Past Surgical History:   Procedure Laterality Date    ANGIOGRAM, CORONARY, WITH LEFT HEART CATHETERIZATION N/A 03/26/2024    Procedure: Angiogram, Coronary, with Left Heart Cath;  Surgeon: Adriano Montiel MD;  Location: [facility] CATH LAB;  Service: Cardiology;  Laterality: N/A;    ATHERECTOMY OF PERIPHERAL VESSEL Left 09/12/2022    LEFT SFA ATHERECTOMY, BALLOON ANGIOPLASTY    CATARACT EXTRACTION W/  INTRAOCULAR LENS IMPLANT Left 03/09/2023    Procedure: EXTRACTION, CATARACT, WITH IOL INSERTION;  Surgeon: Georgi Borja MD;  Location: [facility];  Service: Ophthalmology;  Laterality: Left;  19.5  mac    EGD, WITH CLOSED BIOPSY  03/22/2024    Procedure: EGD, WITH CLOSED BIOPSY;  Surgeon: Yumiko  Ronald RICHARD MD;  Location: [facility] ENDOSCOPY;  Service: Gastroenterology;;    ESOPHAGOGASTRODUODENOSCOPY N/A 03/22/2024    Procedure: EGD;  Surgeon: Ronald Calderon MD;  Location: [facility] ENDOSCOPY;  Service: Gastroenterology;  Laterality: N/A;    Heart Stent N/A     > 10yrs. AGO    INCISION AND DRAINAGE N/A 03/18/2024    Procedure: Incision and Drainage;  Surgeon: Fahad Rivas MD;  Location: [facility] OR;  Service: Urology;  Laterality: N/A;  I&D SCROTAL ABSCESS    INSERTION OF STENT INTO PERIPHERAL VESSEL Right 10/17/2022    RIGHT SFA ATHERECTOMY, BALLOON ANGIOPLASTY, STENT; RIGHT ANTERIOR TIBIAL ARTERY ATHERECTOMY, BALLOON ANGIOPLASTY    ORCHIECTOMY Left 03/18/2024    Procedure: ORCHIECTOMY;  Surgeon: Fahad Rivas MD;  Location: [facility];  Service: Urology;  Laterality: Left;     Social History[1]  Family History   Problem Relation Name Age of Onset    Cancer Mother      Hypertension Mother      Heart failure Father      Stroke Father      Hypertension Father      No Known Problems Sister       Home Medications:  Current Outpatient Medications   Medication Instructions    acetaminophen 650 mg, 2 times daily PRN    ALBUTEROL INHL 2 puffs, Every 6 hours PRN    albuterol-ipratropium (DUO-NEB) 2.5 mg-0.5 mg/3 mL nebulizer solution 3 mLs, Nebulization, Every 6 hours PRN, Rescue    aspirin (ECOTRIN) 81 MG EC tablet 1 tablet, Daily    atorvastatin (LIPITOR) 80 mg, Oral, Daily    BREZTRI AEROSPHERE 160-9-4.8 mcg/actuation HFAA 2 puffs, 2 times daily    cetirizine (ZYRTEC) 10 mg, Oral, Daily    clopidogreL (PLAVIX) 75 mg, Oral, Daily    folic acid (FOLVITE) 1 mg, Oral, Daily    furosemide (LASIX) 40 mg, Oral, 2 times daily    gabapentin (NEURONTIN) 300 mg, Oral, 3 times daily    metoprolol succinate (TOPROL-XL) 12.5 mg, Oral, Daily    nicotine (NICODERM CQ) 14 mg/24 hr 1 patch, Transdermal, Daily PRN    nicotine (NICODERM CQ) 21 mg/24 hr 1 patch, Transdermal, Daily    nitrofurantoin,  macrocrystal-monohydrate, (MACROBID) 100 MG capsule 100 mg, Oral, 2 times daily    olopatadine (PATANOL) 0.1 % ophthalmic solution Apply to eye.    pantoprazole (PROTONIX) 40 mg, Oral, 2 times daily    potassium chloride SA (K-DUR,KLOR-CON) 20 MEQ tablet 20 mEq, Oral, 2 times daily    rivaroxaban (XARELTO) 20 mg, Oral, Daily    sacubitriL-valsartan (ENTRESTO) 49-51 mg per tablet 1 tablet, Daily    sertraline (ZOLOFT) 100 mg, Oral, Daily    urea (CARMOL) 40 % Crea Apply topically.    varenicline tartrate (CHANTIX) 0.5 mg    vitamin D (VITAMIN D3) 1,000 Units, Daily      Current Inpatient Medications:  Scheduled:   aspirin  81 mg Oral Daily    atorvastatin  80 mg Oral Daily    clopidogreL  75 mg Oral Daily    folic acid  1 mg Oral Daily    mupirocin   Nasal BID    pantoprazole  40 mg Oral Daily    piperacillin-tazobactam (Zosyn) IV (PEDS and ADULTS) (extended infusion is not appropriate)  4.5 g Intravenous Q8H    rivaroxaban  20 mg Oral Daily    vitamin D  1,000 Units Oral Daily     Infusion:   NORepinephrine bitartrate-D5W  0-3 mcg/kg/min Intravenous Continuous 11.5 mL/hr at 04/25/25 1106 0.035 mcg/kg/min at 04/25/25 1106     PRN:    Current Facility-Administered Medications:     acetaminophen, 650 mg, Oral, Q4H PRN    dextrose 50%, 12.5 g, Intravenous, PRN    dextrose 50%, 25 g, Intravenous, PRN    glucagon (human recombinant), 1 mg, Intramuscular, PRN    glucose, 16 g, Oral, PRN    glucose, 24 g, Oral, PRN    hydrALAZINE, 10 mg, Intravenous, Q4H PRN    labetalol, 10 mg, Intravenous, Q4H PRN    melatonin, 6 mg, Oral, Nightly PRN    naloxone, 0.02 mg, Intravenous, PRN    nicotine, 1 patch, Transdermal, Daily PRN    ondansetron, 8 mg, Oral, Q8H PRN    polyethylene glycol, 17 g, Oral, Daily PRN    prochlorperazine, 5 mg, Intravenous, Q6H PRN    senna-docusate, 1 tablet, Oral, BID PRN    simethicone, 1 tablet, Oral, TID PRN    sodium chloride 0.9%, 10 mL, Intravenous, PRN  Allergies:  Review of patient's allergies  indicates:  No Known Allergies    OBJECTIVE     VITAL SIGNS: 24 HR MIN & MAX Most Recent Vitals   Temp  Min: 97.7 °F (36.5 °C)  Max: 98.5 °F (36.9 °C)  97.7 °F (36.5 °C)   BP  Min: 75/53  Max: 134/76  (!) 75/53    Pulse  Min: 65  Max: 93  85   Resp  Min: 12  Max: 31  13    SpO2  Min: 87 %  Max: 100 %  95 %    Body mass index is 26.64 kg/m².    Physical Exam  Constitutional:       General: He is not in acute distress.     Appearance: He is ill-appearing (chronically).   HENT:      Head: Normocephalic and atraumatic.      Mouth/Throat:      Mouth: Mucous membranes are moist.      Pharynx: Oropharynx is clear.   Eyes:      Extraocular Movements: Extraocular movements intact.   Neck:      Vascular: JVD (improved) present. No carotid bruit.   Cardiovascular:      Rate and Rhythm: Normal rate. Rhythm irregular.   Pulmonary:      Effort: Pulmonary effort is normal. No respiratory distress.      Breath sounds: Normal breath sounds.   Abdominal:      General: Bowel sounds are normal. There is distension (mild).      Palpations: Abdomen is soft.      Tenderness: There is abdominal tenderness (mild tenderness to deep plapatation). There is no guarding or rebound.   Musculoskeletal:         General: Normal range of motion.      Cervical back: Normal range of motion.      Right lower leg: Edema present.      Left lower leg: Edema present.      Comments: Mild BLE   Skin:     General: Skin is warm and dry.      Capillary Refill: Capillary refill takes less than 2 seconds.   Neurological:      General: No focal deficit present.      Mental Status: He is alert and oriented to person, place, and time.      Cranial Nerves: No cranial nerve deficit.   Psychiatric:         Mood and Affect: Mood normal.         Behavior: Behavior normal.         Thought Content: Thought content normal.         Judgment: Judgment normal.         Labs:  CBC:  Recent Labs   Lab 04/23/25  0345 04/24/25  0114 04/25/25  0316   WBC 4.20* 4.14* 4.25*   HGB 9.2*  9.1* 9.5*   HCT 27.3* 27.1* 28.4*    185 210   MCV 73.0* 74.2* 73.6*   RDW 23.9* 24.1* 24.0*     BMP/CMP:  Recent Labs   Lab 04/24/25  0114 04/25/25  0316 04/25/25  1003    135* 135*   K 3.4* 2.9* 3.1*   CL 97* 92* 93*   CO2 27 32* 31   BUN 17.7 13.0 12.7   CREATININE 1.02 0.76 0.91   GLUCOSE 115 102 142*   EGFRNORACEVR >60 >60 >60     RFTs/LFTs:  Recent Labs   Lab 04/23/25  0345 04/23/25  1203 04/24/25  0114 04/25/25  0316 04/25/25  1003   CALCIUM 9.6   < > 9.1 8.9 9.3   LABPROT 6.6  --  6.3 6.3  --    ALBUMIN 3.1*  --  2.9* 2.9*  --    AST 36  --  38 50*  --    ALT 18  --  17 21  --    ALKPHOS 175*  --  178* 188*  --    BILITOT 6.8*  --  5.8* 5.2*  --     < > = values in this interval not displayed.     Recent Labs   Lab 04/23/25 0345 04/24/25 0114 04/25/25 0316   MG 1.60 1.90 1.50*   PHOS 2.3 1.8* 2.8     Cardiac Panel:  Recent Labs   Lab 04/21/25  1205   TROPONINI <0.010   BNP 2,639.5*     Lipid Panel:  Lab Results   Component Value Date    CHOL 96 08/14/2024    HDL 32 (L) 08/14/2024    LDL 49.00 (L) 08/14/2024    TRIG 73 08/14/2024     Diabetes Panel:  Lab Results   Component Value Date    HGBA1C 5.0 03/18/2024     Thyroid Panel:  Lab Results   Component Value Date    TSH 1.097 03/18/2024    ETCAZD1NTRH 1.30 03/18/2024     Anemia Panel:  Lab Results   Component Value Date    IRON 22 (L) 04/22/2025    TIBC 297 04/22/2025    FERRITIN 51.82 04/22/2025    JEHUAMAS94 >2,000 (H) 04/22/2025    FOLATE 18.5 04/22/2025     Urine Drug Screening:  Lab Results   Component Value Date    AMPHETAMINES Negative 04/21/2025    BARBITURATES Negative 04/21/2025    LABBENZ Negative 04/21/2025    CANNABUR Negative 04/21/2025    COCAINEMETAB Negative 04/21/2025    FENTANYL Negative 04/21/2025    MDMA Negative 04/21/2025    OPIATESCREEN Negative 04/21/2025    PCDSOPHENCYN Negative 04/21/2025         ASSESSMENT/PLAN     Septic shock  Cardiogenic shock, resolved  Acute on chronic CHF exacerbation  HFrEF (EF 30%); NYHA  Class III, Stage C  Chronic persistent atrial fibrillation   Nonobstructive coronary artery disease   Nonischemic cardiomyopathy   Pulmonary hypertension, 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  Blood cultures x2 pending.  Antibiotics per primary.   - Patient with significant UOP yesterday despite diuretic holiday.  Continue holding diuretics. The patient is net -9.3 L with a recorded weight loss of -22 lbs since admission.  - Continue holding Entresto and Metoprolol succinate for now as patient is still requiring vasopressor support.    - Consider restarting beta-blocker when patient is able to be weaned off of vasoactives.   - Continue Xarelto 20 mg daily  - Continue DAPT and high-intensity statin  - Life vest battery to be replaced prior to discharge  - Jardiance contraindicated due to patient's history of Claudine's gangrene  - Continue to monitor electrolytes and replete as necessary.  Keep K >4 and Mg >2.  - Strict I&O. Daily standing weights.     Cardiology will sign off now.  Thank you for allowing us to participate in this patient's care.       Rima Ibarra MD  \[facility]\"" Internal Medicine, PGY-1  [facility] Cardiology   04/25/2025             [1]   Social History  Tobacco Use    Smoking status: Every Day     Current packs/day: 0.50     Average packs/day: 0.8 packs/day for 50.3 years (40.9 ttl pk-yrs)     Types: Cigarettes     Start date: 1975     Passive exposure: Current    Smokeless tobacco: Never    Tobacco comments:     States smoke 2-3 cigarettes per day   Substance Use Topics    Alcohol use: Yes     Alcohol/week: 3.0 standard drinks of alcohol     Types: 3 Cans of beer per week     Comment: socially    Drug use: Not Currently     Frequency: 1.0 times per week     Types: Marijuana     Comment: no use in over 1 year

## 2025-04-25 NOTE — PT/OT/SLP PROGRESS
Physical Therapy    Missed Treatment Session - patient unwilling to participate note - 1712 - 04/25/2025    Patient Name:  Ran Reyes Jr.   MRN:  [id number]      -therapist offered patient to get up in chair to eat supper  -patient not seen at this time secondary to patient unwilling to participate  -per patient - I have to eat first  -patients nurse Vincent notified  -will follow-up as patient is appropriate/available/agreeable to participate and as therapists' schedule allows.

## 2025-04-25 NOTE — PROGRESS NOTES
Inpatient Nutrition Assessment    Admit Date: 4/21/2025   Total duration of encounter: 4 days   Patient Age: 69 y.o.    Nutrition Recommendation/Prescription     Continue heart healthy/ 1.5 L fluid restriction  continue boost plus -1 carton daily; Boost Plus (provides 360 kcal, 14 g protein per serving)   Electrolyte replacement as needed   MVI/fe  Biweekly wt  Pt education on diet complete  Will monitor nutrition status     Communication of Recommendations: reviewed with nurse and reviewed with patient    Nutrition Assessment     Malnutrition Assessment/Nutrition-Focused Physical Exam    Malnutrition Context: chronic illness (04/22/25 1459)  Malnutrition Level: moderate (04/22/25 1459)  Energy Intake (Malnutrition): less than 75% for greater than 7 days (04/22/25 1459)  Weight Loss (Malnutrition): other (see comments) (Does not meet criteria) (04/22/25 1459)  Subcutaneous Fat (Malnutrition): mild depletion (04/22/25 1459)  Orbital Region (Subcutaneous Fat Loss): mild depletion  Upper Arm Region (Subcutaneous Fat Loss): mild depletion     Muscle Mass (Malnutrition): mild depletion (04/22/25 1459)     Clavicle Bone Region (Muscle Loss): mild depletion         Fluid Accumulation (Malnutrition): mild (04/22/25 1459)     Hand  Strength, Right (Malnutrition): Unable to assess (04/22/25 1459)  A minimum of two characteristics is recommended for diagnosis of either severe or non-severe malnutrition.    Chart Review    Reason Seen: continuous nutrition monitoring and follow-up    Malnutrition Screening Tool Results   Have you recently lost weight without trying?: No  Have you been eating poorly because of a decreased appetite?: No   MST Score: 0   Diagnosis:  Afib, CAD, heart failure, pulmonary HTN, volume overload, hyponatremia, ELIZABETH, anion gap acidemia, abnormal UA, PVD, anemia, COPD, glaucoma ; cardiogenic shock , sepsis    Relevant Medical History: tobacco use, COPD, HTN, HLD, Afib, heart failure, PVD, fourniers  gangrene    Scheduled Medications:  aspirin, 81 mg, Daily  atorvastatin, 80 mg, Daily  clopidogreL, 75 mg, Daily  folic acid, 1 mg, Daily  mupirocin, , BID  pantoprazole, 40 mg, Daily  piperacillin-tazobactam (Zosyn) IV (PEDS and ADULTS) (extended infusion is not appropriate), 4.5 g, Q8H  rivaroxaban, 20 mg, Daily  vitamin D, 1,000 Units, Daily    Continuous Infusions:  NORepinephrine bitartrate-D5W, Last Rate: 0.035 mcg/kg/min (04/25/25 1106)    PRN Medications:  acetaminophen, 650 mg, Q4H PRN  dextrose 50%, 12.5 g, PRN  dextrose 50%, 25 g, PRN  glucagon (human recombinant), 1 mg, PRN  glucose, 16 g, PRN  glucose, 24 g, PRN  hydrALAZINE, 10 mg, Q4H PRN  labetalol, 10 mg, Q4H PRN  melatonin, 6 mg, Nightly PRN  naloxone, 0.02 mg, PRN  nicotine, 1 patch, Daily PRN  ondansetron, 8 mg, Q8H PRN  polyethylene glycol, 17 g, Daily PRN  prochlorperazine, 5 mg, Q6H PRN  senna-docusate, 1 tablet, BID PRN  simethicone, 1 tablet, TID PRN  sodium chloride 0.9%, 10 mL, PRN    Calorie Containing IV Medications: no significant kcals from medications at this time    Recent Labs   Lab 04/21/25  1205 04/22/25  0336 04/23/25  0345 04/23/25  1203 04/24/25  0114 04/25/25  0316 04/25/25  1003   * 133* 136 137 137 135* 135*   K 4.2 3.6 2.7* 3.2* 3.4* 2.9* 3.1*   CALCIUM 9.9 9.5 9.6 9.5 9.1 8.9 9.3   PHOS  --  2.9 2.3  --  1.8* 2.8  --    MG 2.10 1.90 1.60  --  1.90 1.50*  --    CL 98 99 96* 96* 97* 92* 93*   CO2 14* 18* 28 28 27 32* 31   BUN 25.0 23.5 19.8 20.7 17.7 13.0 12.7   CREATININE 1.93* 1.67* 1.22 1.29* 1.02 0.76 0.91   EGFRNORACEVR 37 45 >60 >60 >60 >60 >60   GLUCOSE 90 82 106 102 115 102 142*   BILITOT 7.7* 7.2* 6.8*  --  5.8* 5.2*  --    ALKPHOS 191* 175* 175*  --  178* 188*  --    ALT 19 18 18  --  17 21  --    AST 35 32 36  --  38 50*  --    ALBUMIN 3.8 3.5 3.1*  --  2.9* 2.9*  --    WBC 5.75 5.82 4.20*  --  4.14* 4.25*  --    HGB 9.0* 8.6* 9.2*  --  9.1* 9.5*  --    HCT 27.8* 26.1* 27.3*  --  27.1* 28.4*  --       Nutrition Orders:  Diet Heart Healthy Fluid - 1500mL; Standard Tray  Dietary nutrition supplements Daily; Boost Plus Nutritional Drink - Rich Chocolate    Appetite/Oral Intake: fair/50-75% of meals  Factors Affecting Nutritional Intake: decreased appetite and shortness of breath  Social Needs Impacting Access to Food: none identified  Food/Christianity/Cultural Preferences: none reported  Food Allergies: none reported  Last Bowel Movement: 25  Wound(s):  none    Comments  () Pt tolerating oral diet; po intake remains fair; encouraged ONS; noted low K/Mg--suggest electrolyte repletion. Pt remains on levophed/pressor support. Tbili--elevated. Pt wt 196#; diuresis.     () Pt reported fair po intake; eating 50-75% meals; + LE edema; mild fat/muscle wasting; wt fluctuates with fluid; on diuretic; UBW between 205-225#. Pt willing to drink ONS; will order once daily --need to monitor fluid volume. Pt education complete on diet tx.     Anthropometrics    Height: 6' (182.9 cm), Height Method: Stated  Last Weight: 89.1 kg (196 lb 6.9 oz) (25 0600), Weight Method: Bed Scale  BMI (Calculated): 26.6  BMI Classification: overweight (BMI 25-29.9)        Ideal Body Weight (IBW), Male: 178 lb     % Ideal Body Weight, Male (lb): 119.66 %                 Usual Body Weight (UBW), k kg (wt fluctuates 205-225#; fluid)  % Usual Body Weight: 100     Usual Weight Provided By: patient and EMR weight history    Wt Readings from Last 5 Encounters:   25 89.1 kg (196 lb 6.9 oz)   25 102.1 kg (225 lb)   25 101.2 kg (223 lb)   25 102.5 kg (225 lb 14.4 oz)   02/15/25 103.6 kg (228 lb 6.3 oz)     Weight Change(s) Since Admission: -225#  Wt Readings from Last 1 Encounters:   25 0600 89.1 kg (196 lb 6.9 oz)   25 0555 89.1 kg (196 lb 6.9 oz)   25 0502 87.8 kg (193 lb 9 oz)   25 0610 92.8 kg (204 lb 9.6 oz)   25 1709 96.6 kg (213 lb)   25 1137 97.5 kg (215 lb)    Admit Weight: 97.5 kg (215 lb) (04/21/25 1137), Weight Method: Stated    Estimated Needs    Weight Used For Calorie Calculations: 92.8 kg (204 lb 9.4 oz)  Energy Calorie Requirements (kcal): 2320 kcal/d; 25 tyron/kg  Energy Need Method: Kcal/kg  Weight Used For Protein Calculations: 92.8 kg (204 lb 9.4 oz)  Protein Requirements: 93 gm protein/d; 1 gm/kg  Fluid Requirements (mL): 2320 ml/d; 1ml/tyron        Enteral Nutrition     Patient not receiving enteral nutrition at this time.    Parenteral Nutrition     Patient not receiving parenteral nutrition support at this time.    Evaluation of Received Nutrient Intake    Calories: not meeting estimated needs  Protein: not meeting estimated needs    Patient Education     Education Provided: heart healthy diet  Teaching Method: explanation and printed materials  Comprehension: verbalizes understanding  Barriers to Learning: none evident  Expected Compliance: fair  Comments: All questions were answered and dietitian's contact information was provided.     Nutrition Diagnosis     PES: Inadequate oral intake related to chronic illness as evidenced by eating 75% or less meals . (active)     PES: Moderate chronic disease or condition related malnutrition Related to chronic illness  As Evidenced by:  - energy intake: < 75% for 3 weeks (meets criteria for < 75% for > 7 days (moderate - acute)) - muscle mass depletion: 2 areas of mild muscle loss (Trapezius, Clavicle) - loss of subcutaneous fat: 3 areas of mild fat loss (Buccal, Triceps Skinfold, Infraorbital) - fluid accumulation: 1 area of mild fluid accumulation (Lower extremities edema) active    Nutrition Interventions     Intervention(s): modified composition of meals/snacks, multivitamin/mineral supplement therapy, purpose of nutrition education, and collaboration with other providers  Intervention(s): Oral nutritional supplement;Nutrition education;Care coordination or referral;Oral diet/nutrient modifications    Goal: Meet  greater than 80% of nutritional needs by follow-up. (goal progressing)  Goal: Maintain weight throughout hospitalization. (goal progressing)    Nutrition Goals & Monitoring     Dietitian will monitor: food and beverage intake, weight, and food/nutrition knowledge skill  Discharge planning: continue heart healthy diet  Nutrition Risk/Follow-Up: high (follow-up in 1-4 days)   Please consult if re-assessment needed sooner.

## 2025-04-26 LAB
ALBUMIN SERPL-MCNC: 2.9 G/DL (ref 3.4–4.8)
ALBUMIN/GLOB SERPL: 0.8 RATIO (ref 1.1–2)
ALP SERPL-CCNC: 182 UNIT/L (ref 40–150)
ALT SERPL-CCNC: 21 UNIT/L (ref 0–55)
ANION GAP SERPL CALC-SCNC: 10 MEQ/L
AST SERPL-CCNC: 49 UNIT/L (ref 11–45)
BASOPHILS # BLD AUTO: 0.04 X10(3)/MCL
BASOPHILS NFR BLD AUTO: 0.8 %
BILIRUB SERPL-MCNC: 4.5 MG/DL
BUN SERPL-MCNC: 15.8 MG/DL (ref 8.4–25.7)
CALCIUM SERPL-MCNC: 8.9 MG/DL (ref 8.8–10)
CHLORIDE SERPL-SCNC: 92 MMOL/L (ref 98–107)
CO2 SERPL-SCNC: 32 MMOL/L (ref 23–31)
CREAT SERPL-MCNC: 0.85 MG/DL (ref 0.72–1.25)
CREAT/UREA NIT SERPL: 19
EOSINOPHIL # BLD AUTO: 0.28 X10(3)/MCL (ref 0–0.9)
EOSINOPHIL NFR BLD AUTO: 5.7 %
ERYTHROCYTE [DISTWIDTH] IN BLOOD BY AUTOMATED COUNT: 25.2 % (ref 11.5–17)
GFR SERPLBLD CREATININE-BSD FMLA CKD-EPI: >60 ML/MIN/1.73/M2
GLOBULIN SER-MCNC: 3.6 GM/DL (ref 2.4–3.5)
GLUCOSE SERPL-MCNC: 103 MG/DL (ref 82–115)
HCT VFR BLD AUTO: 28.3 % (ref 42–52)
HGB BLD-MCNC: 9.2 G/DL (ref 14–18)
IMM GRANULOCYTES # BLD AUTO: 0.01 X10(3)/MCL (ref 0–0.04)
IMM GRANULOCYTES NFR BLD AUTO: 0.2 %
LYMPHOCYTES # BLD AUTO: 0.99 X10(3)/MCL (ref 0.6–4.6)
LYMPHOCYTES NFR BLD AUTO: 20.2 %
MAGNESIUM SERPL-MCNC: 1.8 MG/DL (ref 1.6–2.6)
MCH RBC QN AUTO: 24 PG (ref 27–31)
MCHC RBC AUTO-ENTMCNC: 32.5 G/DL (ref 33–36)
MCV RBC AUTO: 73.7 FL (ref 80–94)
MONOCYTES # BLD AUTO: 0.84 X10(3)/MCL (ref 0.1–1.3)
MONOCYTES NFR BLD AUTO: 17.1 %
NEUTROPHILS # BLD AUTO: 2.75 X10(3)/MCL (ref 2.1–9.2)
NEUTROPHILS NFR BLD AUTO: 56 %
NRBC BLD AUTO-RTO: 0 %
PHOSPHATE SERPL-MCNC: 2.3 MG/DL (ref 2.3–4.7)
PLATELET # BLD AUTO: 192 X10(3)/MCL (ref 130–400)
PLATELETS.RETICULATED NFR BLD AUTO: 2.8 % (ref 0.9–11.2)
PMV BLD AUTO: ABNORMAL FL
POTASSIUM SERPL-SCNC: 3.6 MMOL/L (ref 3.5–5.1)
PROT SERPL-MCNC: 6.5 GM/DL (ref 5.8–7.6)
RBC # BLD AUTO: 3.84 X10(6)/MCL (ref 4.7–6.1)
SODIUM SERPL-SCNC: 134 MMOL/L (ref 136–145)
WBC # BLD AUTO: 4.91 X10(3)/MCL (ref 4.5–11.5)

## 2025-04-26 PROCEDURE — 80053 COMPREHEN METABOLIC PANEL: CPT

## 2025-04-26 PROCEDURE — 27000207 HC ISOLATION

## 2025-04-26 PROCEDURE — 36415 COLL VENOUS BLD VENIPUNCTURE: CPT

## 2025-04-26 PROCEDURE — 84100 ASSAY OF PHOSPHORUS: CPT

## 2025-04-26 PROCEDURE — 20000000 HC ICU ROOM

## 2025-04-26 PROCEDURE — 83735 ASSAY OF MAGNESIUM: CPT

## 2025-04-26 PROCEDURE — 63600175 PHARM REV CODE 636 W HCPCS

## 2025-04-26 PROCEDURE — 94761 N-INVAS EAR/PLS OXIMETRY MLT: CPT

## 2025-04-26 PROCEDURE — 93005 ELECTROCARDIOGRAM TRACING: CPT

## 2025-04-26 PROCEDURE — 25000003 PHARM REV CODE 250

## 2025-04-26 PROCEDURE — 97162 PT EVAL MOD COMPLEX 30 MIN: CPT

## 2025-04-26 PROCEDURE — 85025 COMPLETE CBC W/AUTO DIFF WBC: CPT

## 2025-04-26 RX ORDER — HYDROXYZINE PAMOATE 25 MG/1
25 CAPSULE ORAL EVERY 8 HOURS PRN
Status: DISCONTINUED | OUTPATIENT
Start: 2025-04-26 | End: 2025-05-02

## 2025-04-26 RX ORDER — HYDROCORTISONE 1 %
CREAM (GRAM) TOPICAL 3 TIMES DAILY PRN
Status: DISCONTINUED | OUTPATIENT
Start: 2025-04-26 | End: 2025-05-20 | Stop reason: HOSPADM

## 2025-04-26 RX ORDER — HYDROXYZINE PAMOATE 25 MG/1
25 CAPSULE ORAL ONCE
Status: COMPLETED | OUTPATIENT
Start: 2025-04-26 | End: 2025-04-26

## 2025-04-26 RX ORDER — LOPERAMIDE HYDROCHLORIDE 2 MG/1
4 CAPSULE ORAL ONCE
Status: COMPLETED | OUTPATIENT
Start: 2025-04-26 | End: 2025-04-26

## 2025-04-26 RX ADMIN — HYDROCORTISONE: 10 CREAM TOPICAL at 05:04

## 2025-04-26 RX ADMIN — MUPIROCIN: 20 OINTMENT TOPICAL at 08:04

## 2025-04-26 RX ADMIN — Medication 1000 UNITS: at 09:04

## 2025-04-26 RX ADMIN — PIPERACILLIN AND TAZOBACTAM 4.5 G: 4; .5 INJECTION, POWDER, LYOPHILIZED, FOR SOLUTION INTRAVENOUS; PARENTERAL at 09:04

## 2025-04-26 RX ADMIN — MUPIROCIN: 20 OINTMENT TOPICAL at 09:04

## 2025-04-26 RX ADMIN — ATORVASTATIN CALCIUM 80 MG: 40 TABLET, FILM COATED ORAL at 09:04

## 2025-04-26 RX ADMIN — CLOPIDOGREL BISULFATE 75 MG: 75 TABLET, FILM COATED ORAL at 09:04

## 2025-04-26 RX ADMIN — PANTOPRAZOLE SODIUM 40 MG: 40 TABLET, DELAYED RELEASE ORAL at 09:04

## 2025-04-26 RX ADMIN — NOREPINEPHRINE BITARTRATE 0.03 MCG/KG/MIN: 4 INJECTION, SOLUTION INTRAVENOUS at 01:04

## 2025-04-26 RX ADMIN — FOLIC ACID 1 MG: 1 TABLET ORAL at 09:04

## 2025-04-26 RX ADMIN — PIPERACILLIN AND TAZOBACTAM 4.5 G: 4; .5 INJECTION, POWDER, LYOPHILIZED, FOR SOLUTION INTRAVENOUS; PARENTERAL at 05:04

## 2025-04-26 RX ADMIN — LOPERAMIDE HYDROCHLORIDE 4 MG: 2 CAPSULE ORAL at 12:04

## 2025-04-26 RX ADMIN — PIPERACILLIN AND TAZOBACTAM 4.5 G: 4; .5 INJECTION, POWDER, LYOPHILIZED, FOR SOLUTION INTRAVENOUS; PARENTERAL at 01:04

## 2025-04-26 RX ADMIN — HYDROXYZINE PAMOATE 25 MG: 25 CAPSULE ORAL at 03:04

## 2025-04-26 RX ADMIN — ASPIRIN 81 MG: 81 TABLET, COATED ORAL at 09:04

## 2025-04-26 RX ADMIN — RIVAROXABAN 20 MG: 10 TABLET, FILM COATED ORAL at 05:04

## 2025-04-26 NOTE — PT/OT/SLP EVAL
Physical Therapy Evaluation    Patient Name:  Ran Reyes Jr.   MRN:  [id number]    Recommendations:     Therapy Intensity Recommendations at Discharge: No Therapy Indicated  Discharge Equipment Recommendations: none   Equipment to be obtained for discharge: none.  Barriers to discharge: level of skilled assistance required    Assessment:     Ran Reyes Jr. is a 69 y.o. male admitted with a medical diagnosis of ELIZABETH.  1. ELIZABETH (acute kidney injury)    2. Weakness    3. Dyspnea    4. Screening due    5. HFrEF (heart failure with reduced ejection fraction)    6. Acidemia    7. Microcytic anemia    8. Tobacco dependency    9. Acute on chronic combined systolic and diastolic congestive heart failure    10. Bradycardia    11. QT prolongation       Problem List[1]   He presents with the following impairments/functional limitations:  weakness, impaired endurance, impaired self care skills, impaired functional mobility, gait instability, impaired cardiopulmonary response to activity.    Rehab Prognosis: Good.    Patient would benefit from continued skilled acute PT services to: address above listed impairments/functional limitations; receive patient/caregiver education; reduce fall risk; and maximize independency/safety with functional mobility.    Recent Surgery: * No surgery found *      Plan:     During this hospitalization, patient to be seen 4 x/week to address the identified impairments/functional limitations via gait training, therapeutic activities, therapeutic exercises and progress toward the established goals.    Plan of Care Expires:  05/24/25    Subjective     Communicated with patient's nurse Vincent prior to session.    Patient agreeable to participate in evaluation.     Chief Complaint: None  Patient/Family Comments/goals: Patient wants to get better and go home.   Pain/Comfort:  Pain Rating 1: 0/10  Pain Rating Post-Intervention 1: 0/10    Patients cultural, spiritual, Hinduism conflicts given the  current situation: no    Social History  Living Environment: Patient lives alone in a first floor apartment, with no steps, with tub-shower combo.  Functional Level: Prior to admission patient was independent in ADL's, ambulated with assistive device, and sister or niece would assist with groceries and cleaning .  Equipment Used at Home: rollator, oxygen, hospital bed, other (see comments) (Life vest)  Equipment owned (not currently used): none.  Assistance Upon Discharge: family.    Hand dominance: left    Objective:     Patient found supine in bed and with HOB elevated with telemetry, pulse ox (continuous), PICC line, peripheral IV, blood pressure cuff, Other (comments) (male external urinary catheter)  upon PT entry to room.    General Precautions: Standard, contact   Orthopedic Precautions:N/A   Braces:  N/A  Respiratory Status: room air    Vitals   At Rest (pre-session)  BP  83/64   HR  82   O2 Sat %  98      With Activity (post-session)  BP  89/57   HR  89   O2 Sat %  95     Exams:  Orientation: Patient is oriented to person, place, time, situation  Commands: Patient follows commands consistently    RLE ROM: WFL  RLE Strength:  3/5  LLE ROM: WFL  LLE Strength:  3/5    Functional Mobility:    Bed Mobility:  Rolling Left: modified independence  Rolling Right: modified independence  Supine to Sit: stand by assistance  Sit to Supine: stand by assistance    Transfers:  Sit to Stand: contact guard assistance with rollator  Stand to Sit: contact guard assistance with rollator  Bedside Commode Transfer: contact guard assistance with rollator using Stand Pivot    Gait:  Patient ambulated 15ft with rollator and contact guard assistance (Limited 2/2 bowel - patient states every time he gets up he needs to use the restroom.   Patient demonstrates :       occasional unsteady gait       decreased susie       flexed posture    Balance:  Sit  Static: GOOD+: Takes MAXIMAL challenges from all directions.    Dynamic: GOOD-:  Incosistently Maintains balance through MODERATE excursions of active trunk movement,     Stand  Static: FAIR+: Takes MINIMAL challenges from all directions  Dynamic: FAIR: Needs CONTACT GUARD during gait    Additional Treatment Session  N/A    Patient left  on bed side commode  with call button in reach and patient's nurse notified.    DME Justifications:  No DME recommended requiring DME justifications    Education     Patient educated on the importance of early mobility to prevent functional decline during hospital stay.  Patient educated on and assisted with functional mobility as noted above.  Patient educated on PT Plan of Care and role of PT in acute care.  Patient was instructed to utilize staff assistance for mobility/transfers.  White board updated regarding patient's safest level of mobility with staff assistance    Goals     Multidisciplinary Problems       Physical Therapy Goals          Problem: Physical Therapy    Goal Priority Disciplines Outcome Interventions   Physical Therapy Goal     PT, PT/OT     Description: Goals to be met by: discharge     Patient will increase functional independence with mobility by performin. Sit <=> stand w/ RW at [facility].  2. Bed <=> chair w/ RW at [facility].  3. Ambulate 130' w/ RW at [facility].                       History:     Past Medical History:   Diagnosis Date    A-fib     Anticoagulant long-term use     Anxiety     Aortic aneurysm     Cataract     CHF (congestive heart failure)     Chronic atrial fibrillation     COPD (chronic obstructive pulmonary disease)     Coronary artery disease     Depression     HLD (hyperlipidemia)     Hypertension     Mitral regurgitation     PAD (peripheral artery disease)     Primary open angle glaucoma (POAG)      Past Surgical History:   Procedure Laterality Date    ANGIOGRAM, CORONARY, WITH LEFT HEART CATHETERIZATION N/A 2024    Procedure: Angiogram, Coronary, with Left Heart Cath;  Surgeon: Adriano Montiel MD;  Location: [facility]  CATH LAB;  Service: Cardiology;  Laterality: N/A;    ATHERECTOMY OF PERIPHERAL VESSEL Left 09/12/2022    LEFT SFA ATHERECTOMY, BALLOON ANGIOPLASTY    CATARACT EXTRACTION W/  INTRAOCULAR LENS IMPLANT Left 03/09/2023    Procedure: EXTRACTION, CATARACT, WITH IOL INSERTION;  Surgeon: Georgi Borja MD;  Location: [facility];  Service: Ophthalmology;  Laterality: Left;  19.5  mac    EGD, WITH CLOSED BIOPSY  03/22/2024    Procedure: EGD, WITH CLOSED BIOPSY;  Surgeon: Ronald Calderon MD;  Location: [facility] ENDOSCOPY;  Service: Gastroenterology;;    ESOPHAGOGASTRODUODENOSCOPY N/A 03/22/2024    Procedure: EGD;  Surgeon: Ronald Calderon MD;  Location: [facility] ENDOSCOPY;  Service: Gastroenterology;  Laterality: N/A;    Heart Stent N/A     > 10yrs. AGO    INCISION AND DRAINAGE N/A 03/18/2024    Procedure: Incision and Drainage;  Surgeon: Fahad Rivas MD;  Location: [facility];  Service: Urology;  Laterality: N/A;  I&D SCROTAL ABSCESS    INSERTION OF STENT INTO PERIPHERAL VESSEL Right 10/17/2022    RIGHT SFA ATHERECTOMY, BALLOON ANGIOPLASTY, STENT; RIGHT ANTERIOR TIBIAL ARTERY ATHERECTOMY, BALLOON ANGIOPLASTY    ORCHIECTOMY Left 03/18/2024    Procedure: ORCHIECTOMY;  Surgeon: Fahad Rivas MD;  Location: [facility];  Service: Urology;  Laterality: Left;     Time Tracking:     PT Received On: 04/26/25  PT Start Time: 1010     PT Stop Time: 1050  PT Total Time (min): 40 min     Billable Minutes: Evaluation 40    04/26/2025         [1]   Patient Active Problem List  Diagnosis    Primary open angle glaucoma (POAG) of right eye, moderate stage    Combined forms of age-related cataract of left eye    Arteriosclerosis of coronary artery    Chronic atrial fibrillation    Dyslipidemia    Hypertension    Tobacco use    PVD (peripheral vascular disease)    VHD (valvular heart disease)    COVID-19    HFrEF (heart failure with reduced ejection fraction)    Nonrheumatic mitral valve regurgitation    Postoperative  eye state    Scrotal hematoma    Chronic obstructive pulmonary disease, unspecified    Lesion of external ear    Low back pain    Other thrombophilia    Secondary hyperaldosteronism    Positive colorectal cancer screening using Cologuard test    Acute heart failure    History of COPD    CHF (congestive heart failure)    Acute cystitis with hematuria    Tobacco dependency    Moderate malnutrition

## 2025-04-26 NOTE — PROGRESS NOTES
[facility] - ICU  Pulmonary Critical Care Note    Patient Name: Ran Reyes Jr.  MRN: [id number]  Admission Date: 4/21/2025  Hospital Length of Stay: 4 days  Code Status: Full Code  Attending Provider: Lennox Vinson MD  Primary Care Provider: Abiodun Recinos DO     Subjective:     HPI:   Ran Reyes Jr. is a 69 y.o.  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 Patient reports he can only ambulate about 20-30 feet before having to stop due to claudication pain which is chronic and unchanged compared to baseline. Since running out of his diuretic medications approximately 5 days ago he has noted progressively worsening lower extremity swelling causing leg heaviness and weakness exacerbating difficulty ambulating.  He also notes acute on chronic cough productive of a brownish sputum without hemoptysis.  Denies fever, chills, sweats.  Denies chest pain.  Endorses shortness of breath at rest and with ambulation but unchanged compared to baseline.  Denies abdominal pain, nausea, vomiting, constipation, diarrhea.  Denies increased or decreased urinary frequency, dysuria, or hematuria.  Sleeps with 2 pillows at night due to baseline orthopnea. Has not had to increase number of pillows or change sleeping habits. Wears 2L NC home oxygen at baseline for hx of COPD. Denies having increased oxygen requirements prior to ED presentation.     ED course:  Vital signs stable.  Oxygenation stable on 2 L  nasal cannula.  CBC shows microcytic anemic (HGB 9.0; MCV 77).  CMP shows hyponatremia (Na 128), acidemia (CO2 14), elevated renal indices (BUN 25; creatinine 1.93), elevated alkaline phosphatase (). Troponin WNL.  BNP 26 39.5.  UDS negative.  EKG showed atrial fibrillation with PVCs.  Chest x-ray difficult to interpret due to overlying medical devices however appears to show cardiomegaly with bilateral pulmonary vascular congestion with question of bilateral pleural effusions. He was admitted for acute CHF exacerbation and cardiorenal syndrome.     Hospital Course/Significant events:  4/23/25: Admit to ICU    24 Hour Interval History:  There were concerns that patient has history of daily EtOH use however, this was note indicated on admission. CIWA protocol placed. Patient c/o itching on his back but improved mildly after Vistaril and hydrocortisone cream overnight. Was weaned off vasopressor briefly but then had to be restarted Currently on 0.02 mcg/kg/min. CBC stable. CMP with alkalosis noted again likely contraction alkalosis from post ATN diuresis. Patient has no other acute complaints.     Past Medical History:   Diagnosis Date    A-fib     Anticoagulant long-term use     Anxiety     Aortic aneurysm     Cataract     CHF (congestive heart failure)     Chronic atrial fibrillation     COPD (chronic obstructive pulmonary disease)     Coronary artery disease     Depression     HLD (hyperlipidemia)     Hypertension     Mitral regurgitation     PAD (peripheral artery disease)     Primary open angle glaucoma (POAG)        Past Surgical History:   Procedure Laterality Date    ANGIOGRAM, CORONARY, WITH LEFT HEART CATHETERIZATION N/A 03/26/2024    Procedure: Angiogram, Coronary, with Left Heart Cath;  Surgeon: Adriano Montiel MD;  Location: [facility] CATH LAB;  Service: Cardiology;  Laterality: N/A;    ATHERECTOMY OF PERIPHERAL VESSEL Left 09/12/2022    LEFT SFA ATHERECTOMY, BALLOON ANGIOPLASTY    CATARACT EXTRACTION  W/  INTRAOCULAR LENS IMPLANT Left 03/09/2023    Procedure: EXTRACTION, CATARACT, WITH IOL INSERTION;  Surgeon: Georgi Borja MD;  Location: [facility];  Service: Ophthalmology;  Laterality: Left;  19.5  mac    EGD, WITH CLOSED BIOPSY  03/22/2024    Procedure: EGD, WITH CLOSED BIOPSY;  Surgeon: Ronald Calderon MD;  Location: [facility] ENDOSCOPY;  Service: Gastroenterology;;    ESOPHAGOGASTRODUODENOSCOPY N/A 03/22/2024    Procedure: EGD;  Surgeon: Ronald Calderon MD;  Location: [facility] ENDOSCOPY;  Service: Gastroenterology;  Laterality: N/A;    Heart Stent N/A     > 10yrs. AGO    INCISION AND DRAINAGE N/A 03/18/2024    Procedure: Incision and Drainage;  Surgeon: Fahad Rivas MD;  Location: [facility];  Service: Urology;  Laterality: N/A;  I&D SCROTAL ABSCESS    INSERTION OF STENT INTO PERIPHERAL VESSEL Right 10/17/2022    RIGHT SFA ATHERECTOMY, BALLOON ANGIOPLASTY, STENT; RIGHT ANTERIOR TIBIAL ARTERY ATHERECTOMY, BALLOON ANGIOPLASTY    ORCHIECTOMY Left 03/18/2024    Procedure: ORCHIECTOMY;  Surgeon: Fahad Rivas MD;  Location: [facility];  Service: Urology;  Laterality: Left;       Social History[1]        Current Outpatient Medications   Medication Instructions    acetaminophen 650 mg, 2 times daily PRN    ALBUTEROL INHL 2 puffs, Every 6 hours PRN    albuterol-ipratropium (DUO-NEB) 2.5 mg-0.5 mg/3 mL nebulizer solution 3 mLs, Nebulization, Every 6 hours PRN, Rescue    aspirin (ECOTRIN) 81 MG EC tablet 1 tablet, Daily    atorvastatin (LIPITOR) 80 mg, Oral, Daily    BREZTRI AEROSPHERE 160-9-4.8 mcg/actuation HFAA 2 puffs, 2 times daily    cetirizine (ZYRTEC) 10 mg, Oral, Daily    clopidogreL (PLAVIX) 75 mg, Oral, Daily    folic acid (FOLVITE) 1 mg, Oral, Daily    furosemide (LASIX) 40 mg, Oral, 2 times daily    gabapentin (NEURONTIN) 300 mg, Oral, 3 times daily    metoprolol succinate (TOPROL-XL) 12.5 mg, Oral, Daily    nicotine (NICODERM CQ) 14 mg/24 hr 1 patch, Transdermal, Daily PRN     nicotine (NICODERM CQ) 21 mg/24 hr 1 patch, Transdermal, Daily    nitrofurantoin, macrocrystal-monohydrate, (MACROBID) 100 MG capsule 100 mg, Oral, 2 times daily    olopatadine (PATANOL) 0.1 % ophthalmic solution Apply to eye.    pantoprazole (PROTONIX) 40 mg, Oral, 2 times daily    potassium chloride SA (K-DUR,KLOR-CON) 20 MEQ tablet 20 mEq, Oral, 2 times daily    rivaroxaban (XARELTO) 20 mg, Oral, Daily    sacubitriL-valsartan (ENTRESTO) 49-51 mg per tablet 1 tablet, Daily    sertraline (ZOLOFT) 100 mg, Oral, Daily    urea (CARMOL) 40 % Crea Apply topically.    varenicline tartrate (CHANTIX) 0.5 mg    vitamin D (VITAMIN D3) 1,000 Units, Daily       Review of patient's allergies indicates:  No Known Allergies     Current Inpatient Medications   aspirin  81 mg Oral Daily    atorvastatin  80 mg Oral Daily    clopidogreL  75 mg Oral Daily    folic acid  1 mg Oral Daily    mupirocin   Nasal BID    pantoprazole  40 mg Oral Daily    piperacillin-tazobactam (Zosyn) IV (PEDS and ADULTS) (extended infusion is not appropriate)  4.5 g Intravenous Q8H    rivaroxaban  20 mg Oral Daily    vitamin D  1,000 Units Oral Daily       Current Intravenous Infusions   NORepinephrine bitartrate-D5W  0-3 mcg/kg/min Intravenous Continuous 9.9 mL/hr at 04/26/25 1006 0.03 mcg/kg/min at 04/26/25 1006         Review of Systems   Constitutional:  Negative for fever.   Eyes:  Negative for blurred vision.   Respiratory:  Negative for cough, shortness of breath and wheezing.    Cardiovascular:  Negative for chest pain, palpitations and leg swelling.   Gastrointestinal:  Negative for abdominal pain, constipation, diarrhea, nausea and vomiting.   Genitourinary:  Negative for dysuria, frequency and urgency.   Musculoskeletal:  Negative for back pain.   Skin:  Positive for itching (back).   Neurological:  Negative for dizziness, speech change, focal weakness, weakness and headaches.          Objective:       Intake/Output Summary (Last 24 hours) at  4/26/2025 1022  Last data filed at 4/26/2025 0535  Gross per 24 hour   Intake 1029.48 ml   Output 805 ml   Net 224.48 ml         Vital Signs (Most Recent):  Temp: 98.5 °F (36.9 °C) (04/26/25 0730)  Pulse: 82 (04/26/25 0919)  Resp: (!) 29 (04/26/25 0919)  BP: (!) 80/46 (04/26/25 0918)  SpO2: (!) 94 % (04/26/25 0919)  Body mass index is 26.4 kg/m².  Weight: 88.3 kg (194 lb 10.7 oz) Vital Signs (24h Range):  Temp:  [97.7 °F (36.5 °C)-98.9 °F (37.2 °C)] 98.5 °F (36.9 °C)  Pulse:  [70-93] 82  Resp:  [12-32] 29  SpO2:  [90 %-100 %] 94 %  BP: ()/(46-76) 80/46     Physical Exam  Vitals reviewed.   Constitutional:       General: He is not in acute distress.     Appearance: He is not ill-appearing.   HENT:      Head: Normocephalic and atraumatic.      Right Ear: External ear normal.      Left Ear: External ear normal.   Eyes:      General: No scleral icterus.     Pupils: Pupils are equal, round, and reactive to light.   Cardiovascular:      Rate and Rhythm: Normal rate. Rhythm irregular.      Pulses: Normal pulses.      Heart sounds: Normal heart sounds. No murmur heard.     No friction rub. No gallop.   Pulmonary:      Effort: Pulmonary effort is normal. No respiratory distress.      Breath sounds: No stridor. No wheezing, rhonchi or rales.   Abdominal:      General: Bowel sounds are normal. There is no distension.      Palpations: Abdomen is soft.      Tenderness: There is abdominal tenderness (LLQ to deep palpation). There is no guarding.   Musculoskeletal:         General: Normal range of motion.      Right lower leg: No edema.      Left lower leg: No edema.   Skin:     General: Skin is warm and dry.      Coloration: Skin is not jaundiced.      Findings: No bruising, erythema or rash.      Comments: Dry skin to back with scratch marks but no rash or lesions   Neurological:      Mental Status: He is alert.      Comments: AAO to person, place, time, and situation; CN II-XII grossly intact   Psychiatric:         Mood  and Affect: Mood normal.         Behavior: Behavior normal.           Lines/Drains/Airways       Peripherally Inserted Central Catheter Line  Duration             PICC Triple Lumen 04/24/25 0942 right brachial 2 days              Drain  Duration             Male External Urinary Catheter 04/25/25 2200 <1 day              Peripheral Intravenous Line  Duration                  Peripheral IV - Single Lumen 20 G Left;Posterior Hand -- days         Peripheral IV - Single Lumen 04/22/25 0000 20 G Left;Posterior Forearm 4 days                    Significant Labs:    Lab Results   Component Value Date    WBC 4.91 04/26/2025    HGB 9.2 (L) 04/26/2025    HCT 28.3 (L) 04/26/2025    MCV 73.7 (L) 04/26/2025     04/26/2025           BMP  Lab Results   Component Value Date     (L) 04/26/2025    K 3.6 04/26/2025    CO2 32 (H) 04/26/2025    BUN 15.8 04/26/2025    CREATININE 0.85 04/26/2025    CALCIUM 8.9 04/26/2025    AGAP 10.0 04/26/2025    EGFRNONAA 88 (L) 12/06/2021         ABG  Recent Labs   Lab 04/21/25  1739   PH 7.280*   PO2 31.0   PCO2 39.0*   HCO3 18.3   POCBASEDEF -7.80       Mechanical Ventilation Support:  Oxygen Concentration (%): 28 (04/22/25 0739)      Significant Imaging:  I have reviewed the pertinent imaging within the past 24 hours.        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  PVD  ELIZABETH  COPD - 2 L supplemental oxygen dependent at home  Tobacco dependence  Microcytic anemia  Dyselectrolytemia   Prolonged QTC        Plan  Continue ongoing care in the ICU  Continue with Levophed and wean as tolerated for MAP goal of 65  Strict I&O and daily  weight  Cardiology consulted, recs appreciated  Hold off on further IV Diuresis and GDMT  Blood culture x 2 - NGTD  Continue with IV Zosyn   Continue with Xarelto 20 mg daily  Continue DAPT and Statin  Will replete electrolytes, Keep K >4, Phos 3 and Mg >2   Itching likely from dry skin and not allergic reaction to Zosyn. Will continue with Vistaril prn and hydrocortisone cream. Avoid Benadryl given prolonged QTC. Will repeat EKG today.     DVT Prophylaxis: Xarelto  GI Prophylaxis: PPI     32 minutes of critical care was time spent personally by me on the following activities: development of treatment plan with patient or surrogate and bedside caregivers, discussions with consultants, evaluation of patient's response to treatment, examination of patient, ordering and performing treatments and interventions, ordering and review of laboratory studies, ordering and review of radiographic studies, pulse oximetry, re-evaluation of patient's condition.  This critical care time did not overlap with that of any other provider or involve time for any procedures.     Valerio Shrestha MD  Pulmonary Critical Care Medicine  [facility] - ICU  DOS: 04/26/2025          [1]   Social History  Socioeconomic History    Marital status: Single   Tobacco Use    Smoking status: Every Day     Current packs/day: 0.50     Average packs/day: 0.8 packs/day for 50.3 years (40.9 ttl pk-yrs)     Types: Cigarettes     Start date: 1975     Passive exposure: Current    Smokeless tobacco: Never    Tobacco comments:     States smoke 2-3 cigarettes per day   Substance and Sexual Activity    Alcohol use: Yes     Alcohol/week: 3.0 standard drinks of alcohol     Types: 3 Cans of beer per week     Comment: socially    Drug use: Not Currently     Frequency: 1.0 times per week     Types: Marijuana     Comment: no use in over 1 year    Sexual activity: Not Currently     Partners: Female     Social Drivers of Health     Financial Resource Strain: Low  Risk  (4/23/2025)    Overall Financial Resource Strain (CARDIA)     Difficulty of Paying Living Expenses: Not hard at all   Food Insecurity: No Food Insecurity (4/23/2025)    Hunger Vital Sign     Worried About Running Out of Food in the Last Year: Never true     Ran Out of Food in the Last Year: Never true   Transportation Needs: No Transportation Needs (4/23/2025)    PRAPARE - Transportation     Lack of Transportation (Medical): No     Lack of Transportation (Non-Medical): No   Physical Activity: Inactive (12/17/2024)    Exercise Vital Sign     Days of Exercise per Week: 0 days     Minutes of Exercise per Session: 0 min   Stress: No Stress Concern Present (4/23/2025)    [employer] - Occupational Stress Questionnaire     Feeling of Stress : Not at all   Housing Stability: Low Risk  (4/23/2025)    Housing Stability Vital Sign     Unable to Pay for Housing in the Last Year: No     Homeless in the Last Year: No

## 2025-04-26 NOTE — CARE UPDATE
Nurse relayed that family members stated that patient has history of daily alcohol use. Spoke with patient who denies having notions of drinking or anxiety regarding sobriety while inpatient. He denies tremors, diaphoresis, or palpitations at this time. Last drink was Tuesday, 4/22/25, and he says he has about 2 18oz beers daily. Will add CIWA at this time with PRN ativan as discussed with ICU nurse on shift.      Elena Matthews, DO

## 2025-04-27 LAB
ALBUMIN SERPL-MCNC: 3 G/DL (ref 3.4–4.8)
ALBUMIN/GLOB SERPL: 0.8 RATIO (ref 1.1–2)
ALP SERPL-CCNC: 175 UNIT/L (ref 40–150)
ALT SERPL-CCNC: 20 UNIT/L (ref 0–55)
ANION GAP SERPL CALC-SCNC: 10 MEQ/L
AST SERPL-CCNC: 45 UNIT/L (ref 11–45)
BASOPHILS # BLD AUTO: 0.04 X10(3)/MCL
BASOPHILS NFR BLD AUTO: 0.8 %
BILIRUB SERPL-MCNC: 4.2 MG/DL
BUN SERPL-MCNC: 14.4 MG/DL (ref 8.4–25.7)
CALCIUM SERPL-MCNC: 9.3 MG/DL (ref 8.8–10)
CHLORIDE SERPL-SCNC: 94 MMOL/L (ref 98–107)
CO2 SERPL-SCNC: 32 MMOL/L (ref 23–31)
CREAT SERPL-MCNC: 0.85 MG/DL (ref 0.72–1.25)
CREAT/UREA NIT SERPL: 17
EOSINOPHIL # BLD AUTO: 0.28 X10(3)/MCL (ref 0–0.9)
EOSINOPHIL NFR BLD AUTO: 5.9 %
ERYTHROCYTE [DISTWIDTH] IN BLOOD BY AUTOMATED COUNT: 24.1 % (ref 11.5–17)
GFR SERPLBLD CREATININE-BSD FMLA CKD-EPI: >60 ML/MIN/1.73/M2
GLOBULIN SER-MCNC: 3.7 GM/DL (ref 2.4–3.5)
GLUCOSE SERPL-MCNC: 110 MG/DL (ref 82–115)
HCT VFR BLD AUTO: 28.9 % (ref 42–52)
HGB BLD-MCNC: 9.4 G/DL (ref 14–18)
IMM GRANULOCYTES # BLD AUTO: 0.01 X10(3)/MCL (ref 0–0.04)
IMM GRANULOCYTES NFR BLD AUTO: 0.2 %
LYMPHOCYTES # BLD AUTO: 0.97 X10(3)/MCL (ref 0.6–4.6)
LYMPHOCYTES NFR BLD AUTO: 20.4 %
MAGNESIUM SERPL-MCNC: 1.66 MG/DL (ref 1.6–2.6)
MCH RBC QN AUTO: 24.1 PG (ref 27–31)
MCHC RBC AUTO-ENTMCNC: 32.5 G/DL (ref 33–36)
MCV RBC AUTO: 74.1 FL (ref 80–94)
MONOCYTES # BLD AUTO: 0.75 X10(3)/MCL (ref 0.1–1.3)
MONOCYTES NFR BLD AUTO: 15.8 %
NEUTROPHILS # BLD AUTO: 2.7 X10(3)/MCL (ref 2.1–9.2)
NEUTROPHILS NFR BLD AUTO: 56.9 %
NRBC BLD AUTO-RTO: 0 %
PHOSPHATE SERPL-MCNC: 3.1 MG/DL (ref 2.3–4.7)
PLATELET # BLD AUTO: 190 X10(3)/MCL (ref 130–400)
PLATELETS.RETICULATED NFR BLD AUTO: 1.8 % (ref 0.9–11.2)
PMV BLD AUTO: 9.7 FL (ref 7.4–10.4)
POTASSIUM SERPL-SCNC: 3.2 MMOL/L (ref 3.5–5.1)
PROT SERPL-MCNC: 6.7 GM/DL (ref 5.8–7.6)
RBC # BLD AUTO: 3.9 X10(6)/MCL (ref 4.7–6.1)
SODIUM SERPL-SCNC: 136 MMOL/L (ref 136–145)
WBC # BLD AUTO: 4.75 X10(3)/MCL (ref 4.5–11.5)

## 2025-04-27 PROCEDURE — 25000003 PHARM REV CODE 250

## 2025-04-27 PROCEDURE — 94761 N-INVAS EAR/PLS OXIMETRY MLT: CPT

## 2025-04-27 PROCEDURE — 85025 COMPLETE CBC W/AUTO DIFF WBC: CPT

## 2025-04-27 PROCEDURE — 27000207 HC ISOLATION

## 2025-04-27 PROCEDURE — 84100 ASSAY OF PHOSPHORUS: CPT

## 2025-04-27 PROCEDURE — 63600175 PHARM REV CODE 636 W HCPCS

## 2025-04-27 PROCEDURE — 36415 COLL VENOUS BLD VENIPUNCTURE: CPT

## 2025-04-27 PROCEDURE — 83735 ASSAY OF MAGNESIUM: CPT

## 2025-04-27 PROCEDURE — 20000000 HC ICU ROOM

## 2025-04-27 PROCEDURE — 80053 COMPREHEN METABOLIC PANEL: CPT

## 2025-04-27 RX ORDER — MAGNESIUM SULFATE 1 G/100ML
1 INJECTION INTRAVENOUS ONCE
Status: COMPLETED | OUTPATIENT
Start: 2025-04-27 | End: 2025-04-27

## 2025-04-27 RX ORDER — POTASSIUM CHLORIDE 20 MEQ/1
40 TABLET, EXTENDED RELEASE ORAL ONCE
Status: COMPLETED | OUTPATIENT
Start: 2025-04-27 | End: 2025-04-27

## 2025-04-27 RX ORDER — MIDODRINE HYDROCHLORIDE 5 MG/1
5 TABLET ORAL 2 TIMES DAILY WITH MEALS
Status: DISCONTINUED | OUTPATIENT
Start: 2025-04-27 | End: 2025-04-28

## 2025-04-27 RX ORDER — MAGNESIUM SULFATE HEPTAHYDRATE 40 MG/ML
2 INJECTION, SOLUTION INTRAVENOUS ONCE
Status: COMPLETED | OUTPATIENT
Start: 2025-04-27 | End: 2025-04-27

## 2025-04-27 RX ADMIN — Medication 1000 UNITS: at 09:04

## 2025-04-27 RX ADMIN — CLOPIDOGREL BISULFATE 75 MG: 75 TABLET, FILM COATED ORAL at 09:04

## 2025-04-27 RX ADMIN — PIPERACILLIN AND TAZOBACTAM 4.5 G: 4; .5 INJECTION, POWDER, LYOPHILIZED, FOR SOLUTION INTRAVENOUS; PARENTERAL at 06:04

## 2025-04-27 RX ADMIN — FOLIC ACID 1 MG: 1 TABLET ORAL at 09:04

## 2025-04-27 RX ADMIN — HYDROCORTISONE: 10 CREAM TOPICAL at 07:04

## 2025-04-27 RX ADMIN — MAGNESIUM SULFATE HEPTAHYDRATE 1 G: 10 INJECTION, SOLUTION INTRAVENOUS at 06:04

## 2025-04-27 RX ADMIN — HYDROXYZINE PAMOATE 25 MG: 25 CAPSULE ORAL at 07:04

## 2025-04-27 RX ADMIN — ATORVASTATIN CALCIUM 80 MG: 40 TABLET, FILM COATED ORAL at 09:04

## 2025-04-27 RX ADMIN — POTASSIUM CHLORIDE 40 MEQ: 1500 TABLET, EXTENDED RELEASE ORAL at 06:04

## 2025-04-27 RX ADMIN — MIDODRINE HYDROCHLORIDE 5 MG: 5 TABLET ORAL at 09:04

## 2025-04-27 RX ADMIN — PANTOPRAZOLE SODIUM 40 MG: 40 TABLET, DELAYED RELEASE ORAL at 09:04

## 2025-04-27 RX ADMIN — ASPIRIN 81 MG: 81 TABLET, COATED ORAL at 08:04

## 2025-04-27 RX ADMIN — MUPIROCIN: 20 OINTMENT TOPICAL at 08:04

## 2025-04-27 RX ADMIN — PIPERACILLIN AND TAZOBACTAM 4.5 G: 4; .5 INJECTION, POWDER, LYOPHILIZED, FOR SOLUTION INTRAVENOUS; PARENTERAL at 09:04

## 2025-04-27 RX ADMIN — NOREPINEPHRINE BITARTRATE 0.04 MCG/KG/MIN: 4 INJECTION, SOLUTION INTRAVENOUS at 10:04

## 2025-04-27 RX ADMIN — MUPIROCIN: 20 OINTMENT TOPICAL at 09:04

## 2025-04-27 RX ADMIN — HYDROXYZINE PAMOATE 25 MG: 25 CAPSULE ORAL at 06:04

## 2025-04-27 RX ADMIN — PIPERACILLIN AND TAZOBACTAM 4.5 G: 4; .5 INJECTION, POWDER, LYOPHILIZED, FOR SOLUTION INTRAVENOUS; PARENTERAL at 01:04

## 2025-04-27 RX ADMIN — HYDROCORTISONE: 10 CREAM TOPICAL at 10:04

## 2025-04-27 RX ADMIN — MAGNESIUM SULFATE HEPTAHYDRATE 2 G: 40 INJECTION, SOLUTION INTRAVENOUS at 08:04

## 2025-04-27 RX ADMIN — MIDODRINE HYDROCHLORIDE 5 MG: 5 TABLET ORAL at 06:04

## 2025-04-27 RX ADMIN — RIVAROXABAN 20 MG: 10 TABLET, FILM COATED ORAL at 06:04

## 2025-04-27 NOTE — PROGRESS NOTES
[facility] - ICU  Pulmonary Critical Care Note    Patient Name: Ran Reyes Jr.  MRN: [id number]  Admission Date: 4/21/2025  Hospital Length of Stay: 5 days  Code Status: Full Code  Attending Provider: Lennox Vinson MD  Primary Care Provider: Abiodun Recinos DO     Subjective:     HPI:   Ran Reyes Jr. is a 69 y.o.  male with PMH of tobacco dependence, chronic obstructive pulmonary disease, hypertension, pulmonary hypertension, hyperlipidemia, chronic persistent atrial fibrillation on Xarelto, nonobstructive coronary artery disease, nonischemic cardiomyopathy, heart failure with with reduced ejection fraction (EF 30%), peripheral vascular disease s/p stenting to superficial femoral artery and right tibial artery, renée's gangrene (03/2024), primary open-angle glaucoma, who presented to [facility] ED (4/21/2025) due to generalized fatigue and weakness x 3-5 days. Patient reports he can only ambulate about 20-30 feet before having to stop due to claudication pain which is chronic and unchanged compared to baseline. Since running out of his diuretic medications approximately 5 days ago he has noted progressively worsening lower extremity swelling causing leg heaviness and weakness exacerbating difficulty ambulating.  He also notes acute on chronic cough productive of a brownish sputum without hemoptysis.  Denies fever, chills, sweats.  Denies chest pain.  Endorses shortness of breath at rest and with ambulation but unchanged compared to baseline.  Denies abdominal pain, nausea, vomiting, constipation, diarrhea.  Denies increased or decreased urinary frequency, dysuria, or hematuria.  Sleeps with 2 pillows at night due to baseline orthopnea. Has not had to increase number of pillows or change sleeping habits. Wears 2L NC home oxygen at baseline for hx of COPD. Denies having increased oxygen requirements prior to ED presentation.     ED course:  Vital signs stable.  Oxygenation stable on 2 L  nasal cannula.  CBC shows microcytic anemic (HGB 9.0; MCV 77).  CMP shows hyponatremia (Na 128), acidemia (CO2 14), elevated renal indices (BUN 25; creatinine 1.93), elevated alkaline phosphatase (). Troponin WNL.  BNP 26 39.5.  UDS negative.  EKG showed atrial fibrillation with PVCs.  Chest x-ray difficult to interpret due to overlying medical devices however appears to show cardiomegaly with bilateral pulmonary vascular congestion with question of bilateral pleural effusions. He was admitted for acute CHF exacerbation and cardiorenal syndrome.     Hospital Course/Significant events:  4/23/25: Admit to ICU    24 Hour Interval History:  NAEON. Patient states his back itching has improved some and nurse reports the same. He remains on Levophed - minimal, today at around 0.035 mcg/kg/min. He has no acute complaints. Nursing staff noted some weakness however PT eval with no therapy indicated yesterday. CBC stable. CMP with hypokalemia, and hypochloremic alkalosis. Mag of 1.66. EKG noted with prolonged QTC.     Past Medical History:   Diagnosis Date    A-fib     Anticoagulant long-term use     Anxiety     Aortic aneurysm     Cataract     CHF (congestive heart failure)     Chronic atrial fibrillation     COPD (chronic obstructive pulmonary disease)     Coronary artery disease     Depression     HLD (hyperlipidemia)     Hypertension     Mitral regurgitation     PAD (peripheral artery disease)     Primary open angle glaucoma (POAG)        Past Surgical History:   Procedure Laterality Date    ANGIOGRAM, CORONARY, WITH LEFT HEART CATHETERIZATION N/A 03/26/2024    Procedure: Angiogram, Coronary, with Left Heart Cath;  Surgeon: Adriano Montiel MD;  Location: [facility] CATH LAB;  Service: Cardiology;  Laterality: N/A;    ATHERECTOMY OF PERIPHERAL VESSEL Left 09/12/2022    LEFT SFA ATHERECTOMY, BALLOON ANGIOPLASTY    CATARACT EXTRACTION W/  INTRAOCULAR LENS IMPLANT Left 03/09/2023    Procedure: EXTRACTION, CATARACT, WITH IOL  INSERTION;  Surgeon: Georgi Borja MD;  Location: [facility];  Service: Ophthalmology;  Laterality: Left;  19.5  mac    EGD, WITH CLOSED BIOPSY  03/22/2024    Procedure: EGD, WITH CLOSED BIOPSY;  Surgeon: Ronald Calderon MD;  Location: [facility] ENDOSCOPY;  Service: Gastroenterology;;    ESOPHAGOGASTRODUODENOSCOPY N/A 03/22/2024    Procedure: EGD;  Surgeon: Ronald Calderon MD;  Location: [facility] ENDOSCOPY;  Service: Gastroenterology;  Laterality: N/A;    Heart Stent N/A     > 10yrs. AGO    INCISION AND DRAINAGE N/A 03/18/2024    Procedure: Incision and Drainage;  Surgeon: Fahad Rivas MD;  Location: [facility];  Service: Urology;  Laterality: N/A;  I&D SCROTAL ABSCESS    INSERTION OF STENT INTO PERIPHERAL VESSEL Right 10/17/2022    RIGHT SFA ATHERECTOMY, BALLOON ANGIOPLASTY, STENT; RIGHT ANTERIOR TIBIAL ARTERY ATHERECTOMY, BALLOON ANGIOPLASTY    ORCHIECTOMY Left 03/18/2024    Procedure: ORCHIECTOMY;  Surgeon: Fahad Rivas MD;  Location: [facility];  Service: Urology;  Laterality: Left;       Social History[1]        Current Outpatient Medications   Medication Instructions    acetaminophen 650 mg, 2 times daily PRN    ALBUTEROL INHL 2 puffs, Every 6 hours PRN    albuterol-ipratropium (DUO-NEB) 2.5 mg-0.5 mg/3 mL nebulizer solution 3 mLs, Nebulization, Every 6 hours PRN, Rescue    aspirin (ECOTRIN) 81 MG EC tablet 1 tablet, Daily    atorvastatin (LIPITOR) 80 mg, Oral, Daily    BREZTRI AEROSPHERE 160-9-4.8 mcg/actuation HFAA 2 puffs, 2 times daily    cetirizine (ZYRTEC) 10 mg, Oral, Daily    clopidogreL (PLAVIX) 75 mg, Oral, Daily    folic acid (FOLVITE) 1 mg, Oral, Daily    furosemide (LASIX) 40 mg, Oral, 2 times daily    gabapentin (NEURONTIN) 300 mg, Oral, 3 times daily    metoprolol succinate (TOPROL-XL) 12.5 mg, Oral, Daily    nicotine (NICODERM CQ) 14 mg/24 hr 1 patch, Transdermal, Daily PRN    nicotine (NICODERM CQ) 21 mg/24 hr 1 patch, Transdermal, Daily    nitrofurantoin,  macrocrystal-monohydrate, (MACROBID) 100 MG capsule 100 mg, Oral, 2 times daily    olopatadine (PATANOL) 0.1 % ophthalmic solution Apply to eye.    pantoprazole (PROTONIX) 40 mg, Oral, 2 times daily    potassium chloride SA (K-DUR,KLOR-CON) 20 MEQ tablet 20 mEq, Oral, 2 times daily    rivaroxaban (XARELTO) 20 mg, Oral, Daily    sacubitriL-valsartan (ENTRESTO) 49-51 mg per tablet 1 tablet, Daily    sertraline (ZOLOFT) 100 mg, Oral, Daily    urea (CARMOL) 40 % Crea Apply topically.    varenicline tartrate (CHANTIX) 0.5 mg    vitamin D (VITAMIN D3) 1,000 Units, Daily       Review of patient's allergies indicates:  No Known Allergies     Current Inpatient Medications   aspirin  81 mg Oral Daily    atorvastatin  80 mg Oral Daily    clopidogreL  75 mg Oral Daily    folic acid  1 mg Oral Daily    magnesium sulfate 1 g IVPB  1 g Intravenous Once    Followed by    magnesium sulfate 2 g IVPB  2 g Intravenous Once    mupirocin   Nasal BID    pantoprazole  40 mg Oral Daily    piperacillin-tazobactam (Zosyn) IV (PEDS and ADULTS) (extended infusion is not appropriate)  4.5 g Intravenous Q8H    rivaroxaban  20 mg Oral Daily    vitamin D  1,000 Units Oral Daily       Current Intravenous Infusions   NORepinephrine bitartrate-D5W  0-3 mcg/kg/min Intravenous Continuous 12.2 mL/hr at 04/27/25 0619 0.037 mcg/kg/min at 04/27/25 0619         Review of Systems   Constitutional:  Negative for fever.   Eyes:  Negative for blurred vision.   Respiratory:  Negative for cough, shortness of breath and wheezing.    Cardiovascular:  Negative for chest pain, palpitations and leg swelling.   Gastrointestinal:  Negative for abdominal pain, constipation, diarrhea, nausea and vomiting.   Genitourinary:  Negative for dysuria, frequency and urgency.   Musculoskeletal:  Negative for back pain.   Skin:  Positive for itching (back - improving).   Neurological:  Negative for dizziness, speech change, focal weakness, weakness and headaches.          Objective:        Intake/Output Summary (Last 24 hours) at 4/27/2025 0736  Last data filed at 4/27/2025 0619  Gross per 24 hour   Intake 312.42 ml   Output 600 ml   Net -287.58 ml         Vital Signs (Most Recent):  Temp: 98.2 °F (36.8 °C) (04/27/25 0330)  Pulse: 81 (04/27/25 0630)  Resp: 10 (04/27/25 0630)  BP: 99/75 (04/27/25 0630)  SpO2: (!) 94 % (04/27/25 0630)  Body mass index is 26.4 kg/m².  Weight: 88.3 kg (194 lb 10.7 oz) Vital Signs (24h Range):  Temp:  [97.7 °F (36.5 °C)-98.4 °F (36.9 °C)] 98.2 °F (36.8 °C)  Pulse:  [70-96] 81  Resp:  [10-29] 10  SpO2:  [91 %-99 %] 94 %  BP: ()/(46-80) 99/75     Physical Exam  Vitals reviewed.   Constitutional:       General: He is not in acute distress.     Appearance: He is not ill-appearing.   HENT:      Head: Normocephalic and atraumatic.      Right Ear: External ear normal.      Left Ear: External ear normal.   Eyes:      General: No scleral icterus.     Pupils: Pupils are equal, round, and reactive to light.   Cardiovascular:      Rate and Rhythm: Normal rate. Rhythm irregular.      Pulses: Normal pulses.      Heart sounds: Normal heart sounds. No murmur heard.     No friction rub. No gallop.   Pulmonary:      Effort: Pulmonary effort is normal. No respiratory distress.      Breath sounds: No stridor. No wheezing, rhonchi or rales.   Abdominal:      General: Bowel sounds are normal. There is no distension.      Palpations: Abdomen is soft.      Tenderness: There is abdominal tenderness (LLQ to deep palpation). There is no guarding.   Musculoskeletal:         General: Normal range of motion.      Right lower leg: No edema.      Left lower leg: No edema.   Skin:     General: Skin is warm and dry.      Coloration: Skin is not jaundiced.      Findings: No bruising, erythema or rash.      Comments: Dry skin to back with scratch marks but no rash or lesions   Neurological:      Mental Status: He is alert.      Comments: AAO to person, place, time, and situation; CN II-XII  grossly intact   Psychiatric:         Mood and Affect: Mood normal.         Behavior: Behavior normal.           Lines/Drains/Airways       Peripherally Inserted Central Catheter Line  Duration             PICC Triple Lumen 04/24/25 0942 right brachial 2 days              Drain  Duration             Male External Urinary Catheter 04/25/25 2200 1 day              Peripheral Intravenous Line  Duration                  Peripheral IV - Single Lumen 04/22/25 0000 20 G Left;Posterior Forearm 5 days                    Significant Labs:    Lab Results   Component Value Date    WBC 4.75 04/27/2025    HGB 9.4 (L) 04/27/2025    HCT 28.9 (L) 04/27/2025    MCV 74.1 (L) 04/27/2025     04/27/2025           BMP  Lab Results   Component Value Date     04/27/2025    K 3.2 (L) 04/27/2025    CO2 32 (H) 04/27/2025    BUN 14.4 04/27/2025    CREATININE 0.85 04/27/2025    CALCIUM 9.3 04/27/2025    AGAP 10.0 04/27/2025    EGFRNONAA 88 (L) 12/06/2021         ABG  Recent Labs   Lab 04/21/25  1739   PH 7.280*   PO2 31.0   PCO2 39.0*   HCO3 18.3   POCBASEDEF -7.80       Mechanical Ventilation Support:  Oxygen Concentration (%): 28 (04/22/25 0739)      Significant Imaging:  I have reviewed the pertinent imaging within the past 24 hours.        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  PVD  ELIZABETH  COPD - 2 L supplemental oxygen dependent at home  On room air and doing well  Tobacco dependence  Microcytic anemia  Dyselectrolytemia   Prolonged QTC        Plan  Continue ongoing care in the ICU  Continue with Levophed and wean as tolerated for MAP goal of 65  Strict I&O and daily  weight  Cardiology consulted, recs appreciated  Hold off on further IV Diuresis and GDMT  Blood culture x 2 - NGTD  Continue with IV Zosyn   Continue with Xarelto 20 mg daily  Continue DAPT and Statin  Will replete electrolytes, Keep K >4, Phos 3 and Mg >2   Will continue with Vistaril prn and hydrocortisone cream. Avoid Benadryl given prolonged QTC.  Will consider Midodrine 5 mg BID    DVT Prophylaxis: Xarelto  GI Prophylaxis: PPI     32 minutes of critical care was time spent personally by me on the following activities: development of treatment plan with patient or surrogate and bedside caregivers, discussions with consultants, evaluation of patient's response to treatment, examination of patient, ordering and performing treatments and interventions, ordering and review of laboratory studies, ordering and review of radiographic studies, pulse oximetry, re-evaluation of patient's condition.  This critical care time did not overlap with that of any other provider or involve time for any procedures.     Valerio Shrestha MD  Pulmonary Critical Care Medicine  [facility] - ICU  DOS: 04/27/2025          [1]   Social History  Socioeconomic History    Marital status: Single   Tobacco Use    Smoking status: Every Day     Current packs/day: 0.50     Average packs/day: 0.8 packs/day for 50.3 years (40.9 ttl pk-yrs)     Types: Cigarettes     Start date: 1975     Passive exposure: Current    Smokeless tobacco: Never    Tobacco comments:     States smoke 2-3 cigarettes per day   Substance and Sexual Activity    Alcohol use: Yes     Alcohol/week: 3.0 standard drinks of alcohol     Types: 3 Cans of beer per week     Comment: socially    Drug use: Not Currently     Frequency: 1.0 times per week     Types: Marijuana     Comment: no use in over 1 year    Sexual activity: Not Currently     Partners: Female     Social Drivers of Health     Financial Resource Strain: Low Risk  (4/23/2025)    Overall Financial Resource Strain  (CARDIA)     Difficulty of Paying Living Expenses: Not hard at all   Food Insecurity: No Food Insecurity (4/23/2025)    Hunger Vital Sign     Worried About Running Out of Food in the Last Year: Never true     Ran Out of Food in the Last Year: Never true   Transportation Needs: No Transportation Needs (4/23/2025)    PRAPARE - Transportation     Lack of Transportation (Medical): No     Lack of Transportation (Non-Medical): No   Physical Activity: Inactive (12/17/2024)    Exercise Vital Sign     Days of Exercise per Week: 0 days     Minutes of Exercise per Session: 0 min   Stress: No Stress Concern Present (4/23/2025)    [employer] - Occupational Stress Questionnaire     Feeling of Stress : Not at all   Housing Stability: Low Risk  (4/23/2025)    Housing Stability Vital Sign     Unable to Pay for Housing in the Last Year: No     Homeless in the Last Year: No

## 2025-04-28 LAB
ALBUMIN SERPL-MCNC: 3.1 G/DL (ref 3.4–4.8)
ALBUMIN/GLOB SERPL: 0.8 RATIO (ref 1.1–2)
ALP SERPL-CCNC: 177 UNIT/L (ref 40–150)
ALT SERPL-CCNC: 23 UNIT/L (ref 0–55)
ANION GAP SERPL CALC-SCNC: 13 MEQ/L
AST SERPL-CCNC: 45 UNIT/L (ref 11–45)
BASOPHILS # BLD AUTO: 0.05 X10(3)/MCL
BASOPHILS NFR BLD AUTO: 1 %
BILIRUB SERPL-MCNC: 3.8 MG/DL
BUN SERPL-MCNC: 15.5 MG/DL (ref 8.4–25.7)
CALCIUM SERPL-MCNC: 9.3 MG/DL (ref 8.8–10)
CHLORIDE SERPL-SCNC: 95 MMOL/L (ref 98–107)
CO2 SERPL-SCNC: 28 MMOL/L (ref 23–31)
CREAT SERPL-MCNC: 0.79 MG/DL (ref 0.72–1.25)
CREAT/UREA NIT SERPL: 20
EOSINOPHIL # BLD AUTO: 0.28 X10(3)/MCL (ref 0–0.9)
EOSINOPHIL NFR BLD AUTO: 5.4 %
ERYTHROCYTE [DISTWIDTH] IN BLOOD BY AUTOMATED COUNT: 24.9 % (ref 11.5–17)
GFR SERPLBLD CREATININE-BSD FMLA CKD-EPI: >60 ML/MIN/1.73/M2
GLOBULIN SER-MCNC: 3.9 GM/DL (ref 2.4–3.5)
GLUCOSE SERPL-MCNC: 107 MG/DL (ref 82–115)
HCT VFR BLD AUTO: 28.6 % (ref 42–52)
HGB BLD-MCNC: 9.2 G/DL (ref 14–18)
HOLD SPECIMEN: NORMAL
IMM GRANULOCYTES # BLD AUTO: 0.02 X10(3)/MCL (ref 0–0.04)
IMM GRANULOCYTES NFR BLD AUTO: 0.4 %
LACTATE SERPL-SCNC: 1.1 MMOL/L (ref 0.5–2.2)
LYMPHOCYTES # BLD AUTO: 1.03 X10(3)/MCL (ref 0.6–4.6)
LYMPHOCYTES NFR BLD AUTO: 20 %
MAGNESIUM SERPL-MCNC: 2 MG/DL (ref 1.6–2.6)
MCH RBC QN AUTO: 23.5 PG (ref 27–31)
MCHC RBC AUTO-ENTMCNC: 32.2 G/DL (ref 33–36)
MCV RBC AUTO: 73.1 FL (ref 80–94)
MONOCYTES # BLD AUTO: 0.76 X10(3)/MCL (ref 0.1–1.3)
MONOCYTES NFR BLD AUTO: 14.8 %
NEUTROPHILS # BLD AUTO: 3.01 X10(3)/MCL (ref 2.1–9.2)
NEUTROPHILS NFR BLD AUTO: 58.4 %
NRBC BLD AUTO-RTO: 0 %
OHS QRS DURATION: 108 MS
OHS QRS DURATION: 110 MS
OHS QTC CALCULATION: 491 MS
OHS QTC CALCULATION: 502 MS
PHOSPHATE SERPL-MCNC: 3.3 MG/DL (ref 2.3–4.7)
PLATELET # BLD AUTO: 187 X10(3)/MCL (ref 130–400)
PLATELETS.RETICULATED NFR BLD AUTO: 2.8 % (ref 0.9–11.2)
PMV BLD AUTO: ABNORMAL FL
POTASSIUM SERPL-SCNC: 3.4 MMOL/L (ref 3.5–5.1)
PROT SERPL-MCNC: 7 GM/DL (ref 5.8–7.6)
RBC # BLD AUTO: 3.91 X10(6)/MCL (ref 4.7–6.1)
SODIUM SERPL-SCNC: 136 MMOL/L (ref 136–145)
WBC # BLD AUTO: 5.15 X10(3)/MCL (ref 4.5–11.5)

## 2025-04-28 PROCEDURE — 27000207 HC ISOLATION

## 2025-04-28 PROCEDURE — 97116 GAIT TRAINING THERAPY: CPT

## 2025-04-28 PROCEDURE — 94761 N-INVAS EAR/PLS OXIMETRY MLT: CPT

## 2025-04-28 PROCEDURE — 25000003 PHARM REV CODE 250

## 2025-04-28 PROCEDURE — 97530 THERAPEUTIC ACTIVITIES: CPT

## 2025-04-28 PROCEDURE — 63600175 PHARM REV CODE 636 W HCPCS

## 2025-04-28 PROCEDURE — 36415 COLL VENOUS BLD VENIPUNCTURE: CPT

## 2025-04-28 PROCEDURE — 97166 OT EVAL MOD COMPLEX 45 MIN: CPT

## 2025-04-28 PROCEDURE — 84100 ASSAY OF PHOSPHORUS: CPT

## 2025-04-28 PROCEDURE — 80053 COMPREHEN METABOLIC PANEL: CPT

## 2025-04-28 PROCEDURE — 85025 COMPLETE CBC W/AUTO DIFF WBC: CPT

## 2025-04-28 PROCEDURE — 36415 COLL VENOUS BLD VENIPUNCTURE: CPT | Performed by: STUDENT IN AN ORGANIZED HEALTH CARE EDUCATION/TRAINING PROGRAM

## 2025-04-28 PROCEDURE — 83735 ASSAY OF MAGNESIUM: CPT

## 2025-04-28 PROCEDURE — 20000000 HC ICU ROOM

## 2025-04-28 PROCEDURE — 83605 ASSAY OF LACTIC ACID: CPT

## 2025-04-28 RX ORDER — POTASSIUM CHLORIDE 20 MEQ/1
40 TABLET, EXTENDED RELEASE ORAL ONCE
Status: COMPLETED | OUTPATIENT
Start: 2025-04-28 | End: 2025-04-28

## 2025-04-28 RX ORDER — MIDODRINE HYDROCHLORIDE 5 MG/1
10 TABLET ORAL
Status: DISCONTINUED | OUTPATIENT
Start: 2025-04-28 | End: 2025-05-03

## 2025-04-28 RX ADMIN — MIDODRINE HYDROCHLORIDE 10 MG: 5 TABLET ORAL at 07:04

## 2025-04-28 RX ADMIN — MUPIROCIN: 20 OINTMENT TOPICAL at 08:04

## 2025-04-28 RX ADMIN — MIDODRINE HYDROCHLORIDE 10 MG: 5 TABLET ORAL at 04:04

## 2025-04-28 RX ADMIN — MELATONIN TAB 3 MG 6 MG: 3 TAB at 09:04

## 2025-04-28 RX ADMIN — PIPERACILLIN AND TAZOBACTAM 4.5 G: 4; .5 INJECTION, POWDER, LYOPHILIZED, FOR SOLUTION INTRAVENOUS; PARENTERAL at 02:04

## 2025-04-28 RX ADMIN — CLOPIDOGREL BISULFATE 75 MG: 75 TABLET, FILM COATED ORAL at 08:04

## 2025-04-28 RX ADMIN — HYDROXYZINE PAMOATE 25 MG: 25 CAPSULE ORAL at 02:04

## 2025-04-28 RX ADMIN — PIPERACILLIN AND TAZOBACTAM 4.5 G: 4; .5 INJECTION, POWDER, LYOPHILIZED, FOR SOLUTION INTRAVENOUS; PARENTERAL at 05:04

## 2025-04-28 RX ADMIN — ASPIRIN 81 MG: 81 TABLET, COATED ORAL at 07:04

## 2025-04-28 RX ADMIN — FOLIC ACID 1 MG: 1 TABLET ORAL at 08:04

## 2025-04-28 RX ADMIN — NOREPINEPHRINE BITARTRATE 0.04 MCG/KG/MIN: 4 INJECTION, SOLUTION INTRAVENOUS at 07:04

## 2025-04-28 RX ADMIN — ACETAMINOPHEN 650 MG: 325 TABLET, FILM COATED ORAL at 11:04

## 2025-04-28 RX ADMIN — POTASSIUM CHLORIDE 40 MEQ: 1500 TABLET, EXTENDED RELEASE ORAL at 07:04

## 2025-04-28 RX ADMIN — RIVAROXABAN 20 MG: 10 TABLET, FILM COATED ORAL at 04:04

## 2025-04-28 RX ADMIN — MIDODRINE HYDROCHLORIDE 10 MG: 5 TABLET ORAL at 11:04

## 2025-04-28 RX ADMIN — ATORVASTATIN CALCIUM 80 MG: 40 TABLET, FILM COATED ORAL at 08:04

## 2025-04-28 RX ADMIN — Medication 1000 UNITS: at 08:04

## 2025-04-28 RX ADMIN — PIPERACILLIN AND TAZOBACTAM 4.5 G: 4; .5 INJECTION, POWDER, LYOPHILIZED, FOR SOLUTION INTRAVENOUS; PARENTERAL at 09:04

## 2025-04-28 RX ADMIN — PANTOPRAZOLE SODIUM 40 MG: 40 TABLET, DELAYED RELEASE ORAL at 08:04

## 2025-04-28 RX ADMIN — HYDROXYZINE PAMOATE 25 MG: 25 CAPSULE ORAL at 09:04

## 2025-04-28 NOTE — PT/OT/SLP PROGRESS
Physical Therapy Treatment    Patient Name:  Ran Reyes Jr.   MRN:  [id number]    Recommendations     Therapy Intensity Recommendations at Discharge: No Therapy Indicated  Discharge Equipment Recommendations: bath bench  Barriers to discharge: none    Assessment     Ran Reyes Jr. is a 69 y.o. male admitted with a medical diagnosis of:  1. ELIZABETH (acute kidney injury)    2. Weakness    3. Dyspnea    4. Screening due    5. HFrEF (heart failure with reduced ejection fraction)    6. Acidemia    7. Microcytic anemia    8. Tobacco dependency    9. Acute on chronic combined systolic and diastolic congestive heart failure    10. Bradycardia    11. QT prolongation       Problem List[1]   He presents with the following impairments/functional limitations:  weakness, impaired endurance, impaired self care skills, impaired functional mobility, pain, gait instability.    Rehab Prognosis: Good.    Patient would benefit from continued skilled acute PT services to: address above listed impairments/functional limitations; receive patient/caregiver education; reduce fall risk; and maximize independency/safety with functional mobility.    Recent Surgery: * No surgery found *      Plan     During this hospitalization, patient to be seen 5 x/week to address the identified impairments/functional limitations via gait training, therapeutic activities, therapeutic exercises and progress toward the established goals.    Plan of Care Expires:  05/24/25    Subjective     Communicated with patient's nurse (Sharad) prior to session.    Patient agreeable to participate in treatment session.    Chief Complaint: neck pain  Patient/Family Comments/goals: feel better  Pain/Comfort:  Pain Rating 1: 6/10  Location 1: neck  Pain Addressed 1: Nurse notified, Pre-medicate for activity  Pain Rating Post-Intervention 1: 6/10    Objective     Patient found supine in bed, with HOB elevated, and with bed rails up bilateral HOB, left FOB, and right FOB  with blood pressure cuff, pulse ox (continuous), telemetry, PICC line  upon PT entry to room.    General Precautions: Standard, contact, fall   Orthopedic Precautions:N/A   Braces:  N/A  Respiratory Status: room air    Functional Mobility:    Bed Mobility:  Supine to Sit: independence    Transfers:  Sit to Stand: stand by assistance with rollator  Stand to Sit: stand by assistance with rollator  Bedside Commode Transfer: stand by assistance with rollator using Stand Pivot    Gait:  Patient ambulated 130ft with rollator and stand by assistance.  Patient demonstrates :       steady gait       symmetrical step length       no loss of balance       no mis-steps.    Other Mobility:  Therapeutic Activities performed:        -Pt. With (+) BM in bedside commode with accident in brief and on the floor.  Pt. Required assistance to change brief, however, performed personal hygiene with set-up assistance only.  Pt. Stood at sink to wash hands with supervision for IV pole management.    Patient left sitting in chair with all lines intact, call button in reach, tray table at bedside, and patient's nurse notified.      Education     Patient educated on and assisted with functional mobility as noted above.  Patient educated on PT Plan of Care.  Patient was instructed to utilize staff assistance for mobility/transfers.  White board updated regarding patient's safest level of mobility with staff assistance    Goals     Multidisciplinary Problems       Physical Therapy Goals          Problem: Physical Therapy    Goal Priority Disciplines Outcome Interventions   Physical Therapy Goal     PT, PT/OT Progressing    Description: Goals to be met by: discharge     Patient will increase functional independence with mobility by performin. Sit <=> stand w/ RW at [place].  2. Bed <=> chair w/ RW at [place].  3. Ambulate 130' w/ RW at [place].                       Time Tracking     PT Received On: 25  PT Start Time: 1124     PT Stop Time:  1149  PT Total Time (min): 25 min     Billable Minutes: Gait Training 10 minutes and Therapeutic Activity 15 minutes    04/28/2025       [1]   Patient Active Problem List  Diagnosis    Primary open angle glaucoma (POAG) of right eye, moderate stage    Combined forms of age-related cataract of left eye    Arteriosclerosis of coronary artery    Chronic atrial fibrillation    Dyslipidemia    Hypertension    Tobacco use    PVD (peripheral vascular disease)    VHD (valvular heart disease)    COVID-19    HFrEF (heart failure with reduced ejection fraction)    Nonrheumatic mitral valve regurgitation    Postoperative eye state    Scrotal hematoma    Chronic obstructive pulmonary disease, unspecified    Lesion of external ear    Low back pain    Other thrombophilia    Secondary hyperaldosteronism    Positive colorectal cancer screening using Cologuard test    Acute heart failure    History of COPD    CHF (congestive heart failure)    Acute cystitis with hematuria    Tobacco dependency    Moderate malnutrition

## 2025-04-28 NOTE — PLAN OF CARE
Problem: Skin Injury Risk Increased  Goal: Skin Health and Integrity  Outcome: Progressing     Problem: Adult Inpatient Plan of Care  Goal: Plan of Care Review  Outcome: Progressing  Goal: Patient-Specific Goal (Individualized)  Outcome: Progressing  Goal: Absence of Hospital-Acquired Illness or Injury  Outcome: Progressing  Goal: Optimal Comfort and Wellbeing  Outcome: Progressing  Goal: Readiness for Transition of Care  Outcome: Progressing     Problem: COPD (Chronic Obstructive Pulmonary Disease)  Goal: Optimal Chronic Illness Coping  Outcome: Progressing  Goal: Optimal Level of Functional [place]  Outcome: Progressing  Goal: Absence of Infection Signs and Symptoms  Outcome: Progressing  Goal: Improved Oral Intake  Outcome: Progressing     Problem: Heart Failure  Goal: Optimal Coping  Outcome: Progressing  Goal: Optimal Cardiac Output  Outcome: Progressing  Goal: Stable Heart Rate and Rhythm  Outcome: Progressing  Goal: Optimal Functional Ability  Outcome: Progressing  Goal: Fluid and Electrolyte Balance  Outcome: Progressing  Goal: Improved Oral Intake  Outcome: Progressing  Goal: Effective Breathing Pattern During Sleep  Outcome: Progressing     Problem: Fall Injury Risk  Goal: Absence of Fall and Fall-Related Injury  Outcome: Progressing     Problem: Wound  Goal: Optimal Coping  Outcome: Progressing  Goal: Optimal Functional Ability  Outcome: Progressing  Goal: Absence of Infection Signs and Symptoms  Outcome: Progressing  Goal: Improved Oral Intake  Outcome: Progressing  Goal: Optimal Pain Control and Function  Outcome: Progressing  Goal: Skin Health and Integrity  Outcome: Progressing  Goal: Optimal Wound Healing  Outcome: Progressing     Problem: Acute Kidney Injury/Impairment  Goal: Fluid and Electrolyte Balance  Outcome: Progressing  Goal: Improved Oral Intake  Outcome: Progressing  Goal: Effective Renal Function  Outcome: Progressing     Problem: Dysrhythmia  Goal: Normalized Cardiac  Rhythm  Outcome: Progressing     Problem: Infection  Goal: Absence of Infection Signs and Symptoms  Outcome: Progressing

## 2025-04-28 NOTE — PLAN OF CARE
Problem: Skin Injury Risk Increased  Goal: Skin Health and Integrity  Outcome: Progressing     Problem: Adult Inpatient Plan of Care  Goal: Absence of Hospital-Acquired Illness or Injury  Outcome: Progressing  Goal: Optimal Comfort and Wellbeing  Outcome: Progressing  Goal: Readiness for Transition of Care  Outcome: Progressing     Problem: COPD (Chronic Obstructive Pulmonary Disease)  Goal: Optimal Chronic Illness Coping  Outcome: Progressing  Goal: Optimal Level of Functional [place]  Outcome: Progressing  Goal: Absence of Infection Signs and Symptoms  Outcome: Progressing  Goal: Improved Oral Intake  Outcome: Progressing     Problem: Heart Failure  Goal: Optimal Coping  Outcome: Progressing  Goal: Optimal Cardiac Output  Outcome: Progressing  Goal: Effective Breathing Pattern During Sleep  Outcome: Progressing     Problem: Fall Injury Risk  Goal: Absence of Fall and Fall-Related Injury  Outcome: Progressing     Problem: Wound  Goal: Optimal Coping  Outcome: Progressing     Problem: Acute Kidney Injury/Impairment  Goal: Fluid and Electrolyte Balance  Outcome: Progressing     Problem: Dysrhythmia  Goal: Normalized Cardiac Rhythm  Outcome: Progressing     Problem: Infection  Goal: Absence of Infection Signs and Symptoms  Outcome: Progressing

## 2025-04-28 NOTE — PT/OT/SLP EVAL
Occupational Therapy   Evaluation and Discharge Note    Name: Ran Reyes Jr.  MRN: [id number]  Admitting Diagnosis:   . ELIZABETH (acute kidney injury)    2. Weakness    3. Dyspnea    4. Screening due    5. HFrEF (heart failure with reduced ejection fraction)    6. Acidemia    7. Microcytic anemia    8. Tobacco dependency    9. Acute on chronic combined systolic and diastolic congestive heart failure    10. Bradycardia    11. QT prolongation    Problem List[1]   Recent Surgery: * No surgery found *      Recommendations:     Discharge Recommendations: Low Intensity Therapy- HH OT eval for home assessment   Discharge Equipment Recommendations: bath bench  Barriers to discharge:  None    Assessment:     Ran Reyes Jr. is a 69 y.o. male with a medical diagnosis of  see above . At this time, patient is functioning at their prior level of function  for self care tasks and does not require further acute OT services.     Plan:     During this hospitalization, patient does not require further acute OT services.  Please re-consult if situation changes.    Plan of Care Reviewed with: patient    Subjective     Chief Complaint: urgency of bowels   Patient/Family Comments/goals: return home     Occupational Profile:  Living Environment: Pt lives alone in 1st floor apt with no steps and tub shower combo ; pt takes sponge bath seated at sink due to no DME for tub; recommend tub transfer bench   Previous level of function: pt was independent basic self care tasks and ambulated with rollator; pt reported increased urgency of bowels recently at home and incontinence episodes   Roles and Routines: pt does not drive ; sister and niece assist with shopping, chores, cooking and transportation  Equipment Used at home: oxygen, hospital bed, rollator, other (see comments) (life vest)  Assistance upon Discharge: family     Pain/Comfort:  Pain Rating 1: 0/10  Pain Addressed 1: Nurse notified  Pain Rating Post-Intervention 1:  0/10    Patients cultural, spiritual, Religion conflicts given the current situation: no    Objective:     Communicated with: Nurse Orourke prior to session.  Patient found HOB elevated with blood pressure cuff, PICC line, pulse ox (continuous), telemetry upon OT entry to room.    General Precautions: Standard, contact  Orthopedic Precautions: N/A  Braces: N/A  Respiratory Status: Room air       VITALS  Preactivity  Post activity   BP 93/58  89/67  HR 84  94  O2 97%    No complaints of dizziness and or light headedness     Occupational Performance:    Bed Mobility:    Patient completed Supine to Sit with modified independence    Functional Mobility/Transfers:  Patient completed Sit <> Stand Transfer with modified independence  with  rollator   Patient completed Bed <> Chair Transfer using Step Transfer technique with modified independence with no assistive device  Patient completed Toilet Transfer Stand Pivot technique with modified independence with  bedside commode  Functional Mobility: Pt ambulated with rollator 12 feet bed<>sink with supervision .    Activities of Daily Living:  Grooming: Pt stood at sink with supervison 1 1/2 min and with c/o back pain  8/10 then sat on rollator seat to complete grooming tasks and pain decreased to 0/10 after <30 sec. Pt brushed teeth , washed face and hands; per pt , he sits on rollator seat for grooming at home   Upper Body Dressing: stand by assistance don hospital gown due to assistance with multiple lines   Lower Body Dressing: modified independence don socks and brief garment  while seated EOB   Toileting: modified independence hygiene and clothing mgt after +BM; urgency with BM and small BM initiated on jessica pad. Pt refusing to ambulate to toilet and requesting use BSC due to urgency and incontinence     Cognitive/Visual Perceptual:  Cognitive/Psychosocial Skills:     -       Oriented to: Person, Place, Time, and Situation   -       Follows Commands/attention:Follows  multistep  commands  -       Safety awareness/insight to disability: intact     Physical Exam:  Pt is left hand dominant  B UE AROM, strength and coordination WFL's     Treatment & Education:  Pt. educated on OT  POC, use of call bell for assist with transfers OOB or for any other needs due to fall risk.     Patient left up in chair with all lines intact, call button in reach, and nurse  notified    GOALS:   Multidisciplinary Problems       Occupational Therapy Goals       Not on file                    DME Justifications:  No DME recommended requiring DME justifications    History:     Past Medical History:   Diagnosis Date    A-fib     Anticoagulant long-term use     Anxiety     Aortic aneurysm     Cataract     CHF (congestive heart failure)     Chronic atrial fibrillation     COPD (chronic obstructive pulmonary disease)     Coronary artery disease     Depression     HLD (hyperlipidemia)     Hypertension     Mitral regurgitation     PAD (peripheral artery disease)     Primary open angle glaucoma (POAG)          Past Surgical History:   Procedure Laterality Date    ANGIOGRAM, CORONARY, WITH LEFT HEART CATHETERIZATION N/A 03/26/2024    Procedure: Angiogram, Coronary, with Left Heart Cath;  Surgeon: Adriano Montiel MD;  Location: [facility] CATH LAB;  Service: Cardiology;  Laterality: N/A;    ATHERECTOMY OF PERIPHERAL VESSEL Left 09/12/2022    LEFT SFA ATHERECTOMY, BALLOON ANGIOPLASTY    CATARACT EXTRACTION W/  INTRAOCULAR LENS IMPLANT Left 03/09/2023    Procedure: EXTRACTION, CATARACT, WITH IOL INSERTION;  Surgeon: Georgi Borja MD;  Location: [facility];  Service: Ophthalmology;  Laterality: Left;  19.5  mac    EGD, WITH CLOSED BIOPSY  03/22/2024    Procedure: EGD, WITH CLOSED BIOPSY;  Surgeon: Ronald Calderon MD;  Location: [facility] ENDOSCOPY;  Service: Gastroenterology;;    ESOPHAGOGASTRODUODENOSCOPY N/A 03/22/2024    Procedure: EGD;  Surgeon: Ronald Calderon MD;  Location: [facility] ENDOSCOPY;   Service: Gastroenterology;  Laterality: N/A;    Heart Stent N/A     > 10yrs. AGO    INCISION AND DRAINAGE N/A 03/18/2024    Procedure: Incision and Drainage;  Surgeon: Fahad Rivas MD;  Location: [facility] OR;  Service: Urology;  Laterality: N/A;  I&D SCROTAL ABSCESS    INSERTION OF STENT INTO PERIPHERAL VESSEL Right 10/17/2022    RIGHT SFA ATHERECTOMY, BALLOON ANGIOPLASTY, STENT; RIGHT ANTERIOR TIBIAL ARTERY ATHERECTOMY, BALLOON ANGIOPLASTY    ORCHIECTOMY Left 03/18/2024    Procedure: ORCHIECTOMY;  Surgeon: Fahad Rivas MD;  Location: [facility] OR;  Service: Urology;  Laterality: Left;       Time Tracking:     OT Date of Treatment: 04/28/25  OT Start Time: 0849  OT Stop Time: 0925  OT Total Time (min): 36 min    Billable Minutes:Evaluation 36 min    4/28/2025       [1]   Patient Active Problem List  Diagnosis    Primary open angle glaucoma (POAG) of right eye, moderate stage    Combined forms of age-related cataract of left eye    Arteriosclerosis of coronary artery    Chronic atrial fibrillation    Dyslipidemia    Hypertension    Tobacco use    PVD (peripheral vascular disease)    VHD (valvular heart disease)    COVID-19    HFrEF (heart failure with reduced ejection fraction)    Nonrheumatic mitral valve regurgitation    Postoperative eye state    Scrotal hematoma    Chronic obstructive pulmonary disease, unspecified    Lesion of external ear    Low back pain    Other thrombophilia    Secondary hyperaldosteronism    Positive colorectal cancer screening using Cologuard test    Acute heart failure    History of COPD    CHF (congestive heart failure)    Acute cystitis with hematuria    Tobacco dependency    Moderate malnutrition

## 2025-04-28 NOTE — PROGRESS NOTES
[facility] - ICU  Pulmonary Critical Care Note    Patient Name: Ran Reyes Jr.  MRN: [id number]  Admission Date: 4/21/2025  Hospital Length of Stay: 6 days  Code Status: Full Code  Attending Provider: Lennox Vinson MD  Primary Care Provider: Abiodun Recinos DO     Subjective:     HPI:   Ran Reyes Jr. is a 69 y.o.  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 Patient reports he can only ambulate about 20-30 feet before having to stop due to claudication pain which is chronic and unchanged compared to baseline. Since running out of his diuretic medications approximately 5 days ago he has noted progressively worsening lower extremity swelling causing leg heaviness and weakness exacerbating difficulty ambulating.  He also notes acute on chronic cough productive of a brownish sputum without hemoptysis.  Denies fever, chills, sweats.  Denies chest pain.  Endorses shortness of breath at rest and with ambulation but unchanged compared to baseline.  Denies abdominal pain, nausea, vomiting, constipation, diarrhea.  Denies increased or decreased urinary frequency, dysuria, or hematuria.  Sleeps with 2 pillows at night due to baseline orthopnea. Has not had to increase number of pillows or change sleeping habits. Wears 2L NC home oxygen at baseline for hx of COPD. Denies having increased oxygen requirements prior to ED presentation.     ED course:  Vital signs stable.  Oxygenation stable on 2 L  nasal cannula.  CBC shows microcytic anemic (HGB 9.0; MCV 77).  CMP shows hyponatremia (Na 128), acidemia (CO2 14), elevated renal indices (BUN 25; creatinine 1.93), elevated alkaline phosphatase (). Troponin WNL.  BNP 26 39.5.  UDS negative.  EKG showed atrial fibrillation with PVCs.  Chest x-ray difficult to interpret due to overlying medical devices however appears to show cardiomegaly with bilateral pulmonary vascular congestion with question of bilateral pleural effusions. He was admitted for acute CHF exacerbation and cardiorenal syndrome.     Hospital Course/Significant events:  4/23/25: Admit to ICU    24 Hour Interval History:  NAEON. Afebrile. Patient remains on Levophed despite adding Midodrine 5 mg BID yesterday. States itching is intermittently present. CBC stable. CMP with mild hypokalemia. Repeat lactic acid normal at 1.1     Past Medical History:   Diagnosis Date    A-fib     Anticoagulant long-term use     Anxiety     Aortic aneurysm     Cataract     CHF (congestive heart failure)     Chronic atrial fibrillation     COPD (chronic obstructive pulmonary disease)     Coronary artery disease     Depression     HLD (hyperlipidemia)     Hypertension     Mitral regurgitation     PAD (peripheral artery disease)     Primary open angle glaucoma (POAG)        Past Surgical History:   Procedure Laterality Date    ANGIOGRAM, CORONARY, WITH LEFT HEART CATHETERIZATION N/A 03/26/2024    Procedure: Angiogram, Coronary, with Left Heart Cath;  Surgeon: Adriano Montiel MD;  Location: [facility] CATH LAB;  Service: Cardiology;  Laterality: N/A;    ATHERECTOMY OF PERIPHERAL VESSEL Left 09/12/2022    LEFT SFA ATHERECTOMY, BALLOON ANGIOPLASTY    CATARACT EXTRACTION W/  INTRAOCULAR LENS IMPLANT Left 03/09/2023    Procedure: EXTRACTION, CATARACT, WITH IOL INSERTION;  Surgeon: Georgi Borja MD;  Location: [facility];  Service: Ophthalmology;  Laterality: Left;  19.5  mac    EGD, WITH CLOSED BIOPSY  03/22/2024    Procedure:  EGD, WITH CLOSED BIOPSY;  Surgeon: Ronald Calderon MD;  Location: [facility] ENDOSCOPY;  Service: Gastroenterology;;    ESOPHAGOGASTRODUODENOSCOPY N/A 03/22/2024    Procedure: EGD;  Surgeon: Ronald Calderon MD;  Location: [facility] ENDOSCOPY;  Service: Gastroenterology;  Laterality: N/A;    Heart Stent N/A     > 10yrs. AGO    INCISION AND DRAINAGE N/A 03/18/2024    Procedure: Incision and Drainage;  Surgeon: Fahad Rivas MD;  Location: [facility] OR;  Service: Urology;  Laterality: N/A;  I&D SCROTAL ABSCESS    INSERTION OF STENT INTO PERIPHERAL VESSEL Right 10/17/2022    RIGHT SFA ATHERECTOMY, BALLOON ANGIOPLASTY, STENT; RIGHT ANTERIOR TIBIAL ARTERY ATHERECTOMY, BALLOON ANGIOPLASTY    ORCHIECTOMY Left 03/18/2024    Procedure: ORCHIECTOMY;  Surgeon: Fahad Rivas MD;  Location: [facility];  Service: Urology;  Laterality: Left;       Social History[1]        Current Outpatient Medications   Medication Instructions    acetaminophen 650 mg, 2 times daily PRN    ALBUTEROL INHL 2 puffs, Every 6 hours PRN    albuterol-ipratropium (DUO-NEB) 2.5 mg-0.5 mg/3 mL nebulizer solution 3 mLs, Nebulization, Every 6 hours PRN, Rescue    aspirin (ECOTRIN) 81 MG EC tablet 1 tablet, Daily    atorvastatin (LIPITOR) 80 mg, Oral, Daily    BREZTRI AEROSPHERE 160-9-4.8 mcg/actuation HFAA 2 puffs, 2 times daily    cetirizine (ZYRTEC) 10 mg, Oral, Daily    clopidogreL (PLAVIX) 75 mg, Oral, Daily    folic acid (FOLVITE) 1 mg, Oral, Daily    furosemide (LASIX) 40 mg, Oral, 2 times daily    gabapentin (NEURONTIN) 300 mg, Oral, 3 times daily    metoprolol succinate (TOPROL-XL) 12.5 mg, Oral, Daily    nicotine (NICODERM CQ) 14 mg/24 hr 1 patch, Transdermal, Daily PRN    nicotine (NICODERM CQ) 21 mg/24 hr 1 patch, Transdermal, Daily    nitrofurantoin, macrocrystal-monohydrate, (MACROBID) 100 MG capsule 100 mg, Oral, 2 times daily    olopatadine (PATANOL) 0.1 % ophthalmic solution Apply to eye.    pantoprazole (PROTONIX) 40 mg,  Oral, 2 times daily    potassium chloride SA (K-DUR,KLOR-CON) 20 MEQ tablet 20 mEq, Oral, 2 times daily    rivaroxaban (XARELTO) 20 mg, Oral, Daily    sacubitriL-valsartan (ENTRESTO) 49-51 mg per tablet 1 tablet, Daily    sertraline (ZOLOFT) 100 mg, Oral, Daily    urea (CARMOL) 40 % Crea Apply topically.    varenicline tartrate (CHANTIX) 0.5 mg    vitamin D (VITAMIN D3) 1,000 Units, Daily       Review of patient's allergies indicates:  No Known Allergies     Current Inpatient Medications   aspirin  81 mg Oral Daily    atorvastatin  80 mg Oral Daily    clopidogreL  75 mg Oral Daily    folic acid  1 mg Oral Daily    midodrine  10 mg Oral TID WM    pantoprazole  40 mg Oral Daily    piperacillin-tazobactam (Zosyn) IV (PEDS and ADULTS) (extended infusion is not appropriate)  4.5 g Intravenous Q8H    rivaroxaban  20 mg Oral Daily    vitamin D  1,000 Units Oral Daily       Current Intravenous Infusions   NORepinephrine bitartrate-D5W  0-3 mcg/kg/min Intravenous Continuous 9.9 mL/hr at 04/28/25 0805 0.03 mcg/kg/min at 04/28/25 0805         Review of Systems   Constitutional:  Negative for fever.   Eyes:  Negative for blurred vision.   Respiratory:  Negative for cough, shortness of breath and wheezing.    Cardiovascular:  Negative for chest pain, palpitations and leg swelling.   Gastrointestinal:  Negative for abdominal pain, constipation, diarrhea, nausea and vomiting.   Genitourinary:  Negative for dysuria, frequency and urgency.   Musculoskeletal:  Negative for back pain.   Skin:  Positive for itching (back - improving).   Neurological:  Negative for dizziness, speech change, focal weakness, weakness and headaches.          Objective:       Intake/Output Summary (Last 24 hours) at 4/28/2025 0839  Last data filed at 4/28/2025 0805  Gross per 24 hour   Intake 753.12 ml   Output 1375 ml   Net -621.88 ml         Vital Signs (Most Recent):  Temp: 97.9 °F (36.6 °C) (04/28/25 0730)  Pulse: 80 (04/28/25 0800)  Resp: 20 (04/28/25  0800)  BP: 101/75 (04/28/25 0800)  SpO2: 97 % (04/28/25 0800)  Body mass index is 26.4 kg/m².  Weight: 88.3 kg (194 lb 10.7 oz) Vital Signs (24h Range):  Temp:  [97.7 °F (36.5 °C)-98.4 °F (36.9 °C)] 97.9 °F (36.6 °C)  Pulse:  [68-95] 80  Resp:  [12-31] 20  SpO2:  [88 %-100 %] 97 %  BP: ()/(45-75) 101/75     Physical Exam  Vitals reviewed.   Constitutional:       General: He is not in acute distress.     Appearance: He is not ill-appearing.   HENT:      Head: Normocephalic and atraumatic.      Right Ear: External ear normal.      Left Ear: External ear normal.   Eyes:      General: No scleral icterus.     Pupils: Pupils are equal, round, and reactive to light.   Cardiovascular:      Rate and Rhythm: Normal rate. Rhythm irregular.      Pulses: Normal pulses.      Heart sounds: Normal heart sounds. No murmur heard.     No friction rub. No gallop.   Pulmonary:      Effort: Pulmonary effort is normal. No respiratory distress.      Breath sounds: No stridor. No wheezing, rhonchi or rales.   Abdominal:      General: Bowel sounds are normal. There is no distension.      Palpations: Abdomen is soft.      Tenderness: There is abdominal tenderness (LLQ to deep palpation). There is no guarding.   Musculoskeletal:         General: Normal range of motion.      Right lower leg: No edema.      Left lower leg: No edema.   Skin:     General: Skin is warm and dry.      Coloration: Skin is not jaundiced.      Findings: No bruising, erythema or rash.      Comments: Dry skin to back with scratch marks but no rash or lesions   Neurological:      Mental Status: He is alert.      Comments: AAO to person, place, time, and situation; CN II-XII grossly intact   Psychiatric:         Mood and Affect: Mood normal.         Behavior: Behavior normal.           Lines/Drains/Airways       Peripherally Inserted Central Catheter Line  Duration             PICC Triple Lumen 04/24/25 0942 right brachial 3 days              Peripheral Intravenous  Line  Duration                  Peripheral IV - Single Lumen 04/22/25 0000 20 G Left;Posterior Forearm 6 days                    Significant Labs:    Lab Results   Component Value Date    WBC 5.15 04/28/2025    HGB 9.2 (L) 04/28/2025    HCT 28.6 (L) 04/28/2025    MCV 73.1 (L) 04/28/2025     04/28/2025           BMP  Lab Results   Component Value Date     04/28/2025    K 3.4 (L) 04/28/2025    CO2 28 04/28/2025    BUN 15.5 04/28/2025    CREATININE 0.79 04/28/2025    CALCIUM 9.3 04/28/2025    AGAP 13.0 04/28/2025    EGFRNONAA 88 (L) 12/06/2021         ABG  Recent Labs   Lab 04/21/25  1739   PH 7.280*   PO2 31.0   PCO2 39.0*   HCO3 18.3   POCBASEDEF -7.80       Mechanical Ventilation Support:  Oxygen Concentration (%): 28 (04/22/25 0739)      Significant Imaging:  I have reviewed the pertinent imaging within the past 24 hours.        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  PVD  ELIZABETH  COPD - 2 L supplemental oxygen dependent at home  On room air and doing well  Tobacco dependence  Microcytic anemia  Dyselectrolytemia   Prolonged QTC        Plan  Continue ongoing care in the ICU  Continue with Levophed and wean as tolerated for MAP goal of 65  Strict I&O and daily weight  Cardiology consulted, recs appreciated  Hold off on further IV Diuresis and GDMT  Blood culture x 2 - NGTD  Continue with IV Zosyn   Continue with Xarelto 20 mg daily  Continue DAPT and Statin  Will replete electrolytes, Keep K >4, Phos 3 and Mg >2   Will continue with Vistaril prn and hydrocortisone cream. Avoid Benadryl given prolonged QTC.  Will increase Midodrine to 10 mg  TID    DVT Prophylaxis: Xarelto  GI Prophylaxis: PPI     32 minutes of critical care was time spent personally by me on the following activities: development of treatment plan with patient or surrogate and bedside caregivers, discussions with consultants, evaluation of patient's response to treatment, examination of patient, ordering and performing treatments and interventions, ordering and review of laboratory studies, ordering and review of radiographic studies, pulse oximetry, re-evaluation of patient's condition.  This critical care time did not overlap with that of any other provider or involve time for any procedures.     Valerio Shrestha MD  Pulmonary Critical Care Medicine  [facility] - ICU  DOS: 04/28/2025          [1]   Social History  Socioeconomic History    Marital status: Single   Tobacco Use    Smoking status: Every Day     Current packs/day: 0.50     Average packs/day: 0.8 packs/day for 50.3 years (40.9 ttl pk-yrs)     Types: Cigarettes     Start date: 1975     Passive exposure: Current    Smokeless tobacco: Never    Tobacco comments:     States smoke 2-3 cigarettes per day   Substance and Sexual Activity    Alcohol use: Yes     Alcohol/week: 3.0 standard drinks of alcohol     Types: 3 Cans of beer per week     Comment: socially    Drug use: Not Currently     Frequency: 1.0 times per week     Types: Marijuana     Comment: no use in over 1 year    Sexual activity: Not Currently     Partners: Female     Social Drivers of Health     Financial Resource Strain: Low Risk  (4/23/2025)    Overall Financial Resource Strain (CARDIA)     Difficulty of Paying Living Expenses: Not hard at all   Food Insecurity: No Food Insecurity (4/23/2025)    Hunger Vital Sign     Worried About Running Out of Food in the Last Year: Never true     Ran Out of Food in the Last Year: Never true   Transportation Needs: No Transportation Needs (4/23/2025)    PRAPARE - Transportation     Lack of Transportation (Medical): No      Lack of Transportation (Non-Medical): No   Physical Activity: Inactive (12/17/2024)    Exercise Vital Sign     Days of Exercise per Week: 0 days     Minutes of Exercise per Session: 0 min   Stress: No Stress Concern Present (4/23/2025)    [employer] - Occupational Stress Questionnaire     Feeling of Stress : Not at all   Housing Stability: Low Risk  (4/23/2025)    Housing Stability Vital Sign     Unable to Pay for Housing in the Last Year: No     Homeless in the Last Year: No

## 2025-04-29 LAB
ALBUMIN SERPL-MCNC: 3.1 G/DL (ref 3.4–4.8)
ALBUMIN/GLOB SERPL: 0.8 RATIO (ref 1.1–2)
ALP SERPL-CCNC: 169 UNIT/L (ref 40–150)
ALT SERPL-CCNC: 20 UNIT/L (ref 0–55)
ANION GAP SERPL CALC-SCNC: 11 MEQ/L
AST SERPL-CCNC: 41 UNIT/L (ref 11–45)
BACTERIA BLD CULT: NORMAL
BACTERIA BLD CULT: NORMAL
BASOPHILS # BLD AUTO: 0.04 X10(3)/MCL
BASOPHILS NFR BLD AUTO: 0.7 %
BILIRUB SERPL-MCNC: 3.4 MG/DL
BUN SERPL-MCNC: 19 MG/DL (ref 8.4–25.7)
CALCIUM SERPL-MCNC: 9.4 MG/DL (ref 8.8–10)
CHLORIDE SERPL-SCNC: 97 MMOL/L (ref 98–107)
CO2 SERPL-SCNC: 28 MMOL/L (ref 23–31)
CORTIS SERPL-SCNC: 7.1 UG/DL
CREAT SERPL-MCNC: 0.89 MG/DL (ref 0.72–1.25)
CREAT/UREA NIT SERPL: 21
EOSINOPHIL # BLD AUTO: 0.25 X10(3)/MCL (ref 0–0.9)
EOSINOPHIL NFR BLD AUTO: 4.5 %
ERYTHROCYTE [DISTWIDTH] IN BLOOD BY AUTOMATED COUNT: 24.1 % (ref 11.5–17)
GFR SERPLBLD CREATININE-BSD FMLA CKD-EPI: >60 ML/MIN/1.73/M2
GLOBULIN SER-MCNC: 3.9 GM/DL (ref 2.4–3.5)
GLUCOSE SERPL-MCNC: 106 MG/DL (ref 82–115)
HCT VFR BLD AUTO: 27.7 % (ref 42–52)
HGB BLD-MCNC: 8.9 G/DL (ref 14–18)
IMM GRANULOCYTES # BLD AUTO: 0.01 X10(3)/MCL (ref 0–0.04)
IMM GRANULOCYTES NFR BLD AUTO: 0.2 %
LYMPHOCYTES # BLD AUTO: 1.12 X10(3)/MCL (ref 0.6–4.6)
LYMPHOCYTES NFR BLD AUTO: 20.2 %
MAGNESIUM SERPL-MCNC: 2 MG/DL (ref 1.6–2.6)
MCH RBC QN AUTO: 24.3 PG (ref 27–31)
MCHC RBC AUTO-ENTMCNC: 32.1 G/DL (ref 33–36)
MCV RBC AUTO: 75.5 FL (ref 80–94)
MONOCYTES # BLD AUTO: 0.96 X10(3)/MCL (ref 0.1–1.3)
MONOCYTES NFR BLD AUTO: 17.3 %
NEUTROPHILS # BLD AUTO: 3.16 X10(3)/MCL (ref 2.1–9.2)
NEUTROPHILS NFR BLD AUTO: 57.1 %
NRBC BLD AUTO-RTO: 0 %
PHOSPHATE SERPL-MCNC: 2.9 MG/DL (ref 2.3–4.7)
PLATELET # BLD AUTO: 184 X10(3)/MCL (ref 130–400)
PLATELETS.RETICULATED NFR BLD AUTO: 2.5 % (ref 0.9–11.2)
PMV BLD AUTO: 10.2 FL (ref 7.4–10.4)
POTASSIUM SERPL-SCNC: 3.9 MMOL/L (ref 3.5–5.1)
PROT SERPL-MCNC: 7 GM/DL (ref 5.8–7.6)
RBC # BLD AUTO: 3.67 X10(6)/MCL (ref 4.7–6.1)
SODIUM SERPL-SCNC: 136 MMOL/L (ref 136–145)
WBC # BLD AUTO: 5.54 X10(3)/MCL (ref 4.5–11.5)

## 2025-04-29 PROCEDURE — 84100 ASSAY OF PHOSPHORUS: CPT

## 2025-04-29 PROCEDURE — 36415 COLL VENOUS BLD VENIPUNCTURE: CPT

## 2025-04-29 PROCEDURE — 85025 COMPLETE CBC W/AUTO DIFF WBC: CPT

## 2025-04-29 PROCEDURE — 25000003 PHARM REV CODE 250

## 2025-04-29 PROCEDURE — 27000207 HC ISOLATION

## 2025-04-29 PROCEDURE — 82533 TOTAL CORTISOL: CPT

## 2025-04-29 PROCEDURE — 97116 GAIT TRAINING THERAPY: CPT

## 2025-04-29 PROCEDURE — 63600175 PHARM REV CODE 636 W HCPCS

## 2025-04-29 PROCEDURE — 80053 COMPREHEN METABOLIC PANEL: CPT

## 2025-04-29 PROCEDURE — 20000000 HC ICU ROOM

## 2025-04-29 PROCEDURE — 83735 ASSAY OF MAGNESIUM: CPT

## 2025-04-29 RX ADMIN — CLOPIDOGREL BISULFATE 75 MG: 75 TABLET, FILM COATED ORAL at 09:04

## 2025-04-29 RX ADMIN — PIPERACILLIN AND TAZOBACTAM 4.5 G: 4; .5 INJECTION, POWDER, LYOPHILIZED, FOR SOLUTION INTRAVENOUS; PARENTERAL at 06:04

## 2025-04-29 RX ADMIN — NOREPINEPHRINE BITARTRATE 0.04 MCG/KG/MIN: 4 INJECTION, SOLUTION INTRAVENOUS at 05:04

## 2025-04-29 RX ADMIN — PANTOPRAZOLE SODIUM 40 MG: 40 TABLET, DELAYED RELEASE ORAL at 09:04

## 2025-04-29 RX ADMIN — MIDODRINE HYDROCHLORIDE 10 MG: 5 TABLET ORAL at 06:04

## 2025-04-29 RX ADMIN — FOLIC ACID 1 MG: 1 TABLET ORAL at 09:04

## 2025-04-29 RX ADMIN — ASPIRIN 81 MG: 81 TABLET, COATED ORAL at 07:04

## 2025-04-29 RX ADMIN — RIVAROXABAN 20 MG: 10 TABLET, FILM COATED ORAL at 05:04

## 2025-04-29 RX ADMIN — PIPERACILLIN AND TAZOBACTAM 4.5 G: 4; .5 INJECTION, POWDER, LYOPHILIZED, FOR SOLUTION INTRAVENOUS; PARENTERAL at 09:04

## 2025-04-29 RX ADMIN — HYDROXYZINE PAMOATE 25 MG: 25 CAPSULE ORAL at 11:04

## 2025-04-29 RX ADMIN — MIDODRINE HYDROCHLORIDE 10 MG: 5 TABLET ORAL at 11:04

## 2025-04-29 RX ADMIN — MIDODRINE HYDROCHLORIDE 10 MG: 5 TABLET ORAL at 07:04

## 2025-04-29 RX ADMIN — ATORVASTATIN CALCIUM 80 MG: 40 TABLET, FILM COATED ORAL at 09:04

## 2025-04-29 RX ADMIN — Medication 1000 UNITS: at 09:04

## 2025-04-29 RX ADMIN — PIPERACILLIN AND TAZOBACTAM 4.5 G: 4; .5 INJECTION, POWDER, LYOPHILIZED, FOR SOLUTION INTRAVENOUS; PARENTERAL at 01:04

## 2025-04-29 NOTE — PT/OT/SLP PROGRESS
Physical Therapy Treatment    Patient Name:  Ran Reyes Jr.   MRN:  [id number]    Recommendations     Therapy Intensity Recommendations at Discharge: No Therapy Indicated  Discharge Equipment Recommendations: shower chair, rollator, hospital bed (life vest)   Barriers to discharge: decreased endurance    Assessment     Ran Reyes Jr. is a 69 y.o. male admitted with a medical diagnosis of:  1. ELIZABETH (acute kidney injury)    2. Weakness    3. Dyspnea    4. Screening due    5. HFrEF (heart failure with reduced ejection fraction)    6. Acidemia    7. Microcytic anemia    8. Tobacco dependency    9. Acute on chronic combined systolic and diastolic congestive heart failure    10. Bradycardia    11. QT prolongation       Problem List[1]   He presents with the following impairments/functional limitations:  weakness, impaired endurance, impaired cardiopulmonary response to activity, impaired self care skills, impaired functional mobility, gait instability.    Rehab Prognosis: Good.    Patient would benefit from continued skilled acute PT services to: address above listed impairments/functional limitations; receive patient/caregiver education; reduce fall risk; and maximize independency/safety with functional mobility.    -continued: up-to-chair, ambulation, with progression of gait distance/frequency/duration and speed, as tolerated/appropriate, with assistance and supervision    Recent Surgery: * No surgery found *      Plan     During this hospitalization, patient to be seen 5 x/week to address the identified impairments/functional limitations via gait training, therapeutic exercises and progress toward the established goals.    Plan of Care Expires:  05/24/25    Subjective     Communicated with patient's nurse Jaimee prior to session.    Patient agreeable to participate in treatment session.    Chief Complaint: none  Patient/Family Comments/goals: home soon  Pain/Comfort:  Pain Rating 1: 0/10  Pain Addressed 1:  Nurse notified  Pain Rating Post-Intervention 1: 0/10    Objective     Patient found supine in bed, with HOB elevated, and with bed rails up bilateral HOB and right FOB with telemetry, pulse ox (continuous), peripheral IV, blood pressure cuff (life vest (not on patient), upon PT entry to room.    General Precautions: Standard, fall, contact   Orthopedic Precautions:N/A   Braces: N/A  Respiratory Status: room air  SAT O2 Check: n/a    Functional Mobility:    Bed Mobility:  Rolling Left: modified independence  Scooting: modified independence  Supine to Sit: modified independence  with no cues required  with HOB elevated, hand rail, and firm mattress    Transfers:  Sit to Stand: stand by assistance with rollator  Stand to Sit: stand by assistance with rollator  with no cues required    Gait:  Patient ambulated 260ft x2 w/ sitting rest x4 minutes b/w sessions with rollator and stand by assistance.  Patient demonstrates :       steady gait       symmetrical step length       no loss of balance       no mis-steps       decreased ussie       decreased bilateral step length       flexed posture.    Other Mobility:  N/A    Balance:  Sit  Patient demonstrated static balance on level surface with independence with no verbal cues.  Patient demonstrated dynamic balance on level surface with modified independence with no verbal cues during moderate excursions.  Stand  Patient demonstrated static balance on level surface  using rollator with modified independence with no verbal cues.    Patient left sitting in chair with life vest (not on patient) with all lines intact, call button in reach, tray table at bedside, patient's nurse notified, and patients nurse in room connecting ICU monitoring ( telemetry, pulse ox (continuous) & blood pressure cuff).    DME Justifications:  No DME recommended requiring DME justifications    Education     Patient educated on and assisted with functional mobility as noted above.  Patient educated  on PT Plan of Care  Patient was instructed to utilize staff assistance for mobility/transfers.  White board updated regarding patient's safest level of mobility with staff assistance.    Goals     Multidisciplinary Problems       Physical Therapy Goals       Problem: Physical Therapy    Goal Priority Disciplines Outcome Interventions   Physical Therapy Goal     PT, PT/OT Progressing    Description: REVIEWED 2025  Goals to be met by: discharge   Patient will increase functional independence with mobility by performin. Sit <=> stand w/ RW at Elizabeth. - ONGOING  2. Bed <=> chair w/ RW at Elizabeth. - ONGOING  3. Ambulate 130' w/ RW at Elizabeth. - ONGOING           Time Tracking     PT Received On: 25  PT Start Time: 922     PT Stop Time: 951  PT Total Time (min): 29 min     Billable Minutes: Gait Training 29  Non-Billable Minutes: N/A  2025       [1]   Patient Active Problem List  Diagnosis    Primary open angle glaucoma (POAG) of right eye, moderate stage    Combined forms of age-related cataract of left eye    Arteriosclerosis of coronary artery    Chronic atrial fibrillation    Dyslipidemia    Hypertension    Tobacco use    PVD (peripheral vascular disease)    VHD (valvular heart disease)    COVID-19    HFrEF (heart failure with reduced ejection fraction)    Nonrheumatic mitral valve regurgitation    Postoperative eye state    Scrotal hematoma    Chronic obstructive pulmonary disease, unspecified    Lesion of external ear    Low back pain    Other thrombophilia    Secondary hyperaldosteronism    Positive colorectal cancer screening using Cologuard test    Acute heart failure    History of COPD    CHF (congestive heart failure)    Acute cystitis with hematuria    Tobacco dependency    Moderate malnutrition

## 2025-04-29 NOTE — PROGRESS NOTES
Inpatient Nutrition Assessment    Admit Date: 4/21/2025   Total duration of encounter: 8 days   Patient Age: 69 y.o.    Nutrition Recommendation/Prescription     Continue heart healthy/ 1.5 L fluid restriction; pt requesting extra gravy on food  continue boost plus -1 carton daily; Boost Plus (provides 360 kcal, 14 g protein per serving)   Electrolyte replacement as needed   MVI/fe  Biweekly wt  Pt education on diet complete  Will monitor nutrition status     Communication of Recommendations: reviewed with nurse and reviewed with patient    Nutrition Assessment     Malnutrition Assessment/Nutrition-Focused Physical Exam    Malnutrition Context: chronic illness (04/22/25 1459)  Malnutrition Level: moderate (04/22/25 1459)  Energy Intake (Malnutrition): less than 75% for greater than 7 days (04/22/25 1459)  Weight Loss (Malnutrition): other (see comments) (Does not meet criteria) (04/22/25 1459)  Subcutaneous Fat (Malnutrition): mild depletion (04/22/25 1459)  Orbital Region (Subcutaneous Fat Loss): mild depletion  Upper Arm Region (Subcutaneous Fat Loss): mild depletion     Muscle Mass (Malnutrition): mild depletion (04/22/25 1459)     Clavicle Bone Region (Muscle Loss): mild depletion         Fluid Accumulation (Malnutrition): mild (04/22/25 1459)     Hand  Strength, Right (Malnutrition): Unable to assess (04/22/25 1459)  A minimum of two characteristics is recommended for diagnosis of either severe or non-severe malnutrition.    Chart Review    Reason Seen: continuous nutrition monitoring and follow-up    Malnutrition Screening Tool Results   Have you recently lost weight without trying?: No  Have you been eating poorly because of a decreased appetite?: No   MST Score: 0   Diagnosis:  Afib, CAD, heart failure, pulmonary HTN, volume overload, hyponatremia, ELIZABETH, anion gap acidemia, abnormal UA, PVD, anemia, COPD, glaucoma ; cardiogenic shock , sepsis    Relevant Medical History: tobacco use, COPD, HTN, HLD, Afib,  heart failure, PVD, fourniers gangrene    Scheduled Medications:  aspirin, 81 mg, Daily  atorvastatin, 80 mg, Daily  clopidogreL, 75 mg, Daily  folic acid, 1 mg, Daily  midodrine, 10 mg, TID WM  pantoprazole, 40 mg, Daily  piperacillin-tazobactam (Zosyn) IV (PEDS and ADULTS) (extended infusion is not appropriate), 4.5 g, Q8H  rivaroxaban, 20 mg, Daily  vitamin D, 1,000 Units, Daily    Continuous Infusions:  NORepinephrine bitartrate-D5W, Last Rate: 0.037 mcg/kg/min (04/29/25 0514)    PRN Medications:  acetaminophen, 650 mg, Q4H PRN  dextrose 50%, 12.5 g, PRN  dextrose 50%, 25 g, PRN  glucagon (human recombinant), 1 mg, PRN  glucose, 16 g, PRN  glucose, 24 g, PRN  hydrALAZINE, 10 mg, Q4H PRN  hydrocortisone, , TID PRN  hydrOXYzine pamoate, 25 mg, Q8H PRN  labetalol, 10 mg, Q4H PRN  LORazepam, 2 mg, Q4H PRN  melatonin, 6 mg, Nightly PRN  naloxone, 0.02 mg, PRN  nicotine, 1 patch, Daily PRN  polyethylene glycol, 17 g, Daily PRN  prochlorperazine, 5 mg, Q6H PRN  senna-docusate, 1 tablet, BID PRN  simethicone, 1 tablet, TID PRN  sodium chloride 0.9%, 10 mL, PRN    Calorie Containing IV Medications: no significant kcals from medications at this time    Recent Labs   Lab 04/23/25  0345 04/23/25  1203 04/24/25  0114 04/25/25  0316 04/25/25  1003 04/26/25  0400 04/27/25  0359 04/28/25  0313 04/28/25  0314 04/29/25  0309      < > 137 135* 135* 134* 136 136  --  136   K 2.7*   < > 3.4* 2.9* 3.1* 3.6 3.2* 3.4*  --  3.9   CALCIUM 9.6   < > 9.1 8.9 9.3 8.9 9.3 9.3  --  9.4   PHOS 2.3  --  1.8* 2.8  --  2.3 3.1 3.3  --  2.9   MG 1.60  --  1.90 1.50*  --  1.80 1.66 2.00  --  2.00   CL 96*   < > 97* 92* 93* 92* 94* 95*  --  97*   CO2 28   < > 27 32* 31 32* 32* 28  --  28   BUN 19.8   < > 17.7 13.0 12.7 15.8 14.4 15.5  --  19.0   CREATININE 1.22   < > 1.02 0.76 0.91 0.85 0.85 0.79  --  0.89   EGFRNORACEVR >60   < > >60 >60 >60 >60 >60 >60  --  >60   BILITOT 6.8*  --  5.8* 5.2*  --  4.5* 4.2* 3.8*  --  3.4*   ALKPHOS 175*  --   178* 188*  --  182* 175* 177*  --  169*   ALT 18  --  17 21  --  21 20 23  --  20   AST 36  --  38 50*  --  49* 45 45  --  41   ALBUMIN 3.1*  --  2.9* 2.9*  --  2.9* 3.0* 3.1*  --  3.1*   WBC 4.20*  --  4.14* 4.25*  --  4.91 4.75  --  5.15 5.54   HGB 9.2*  --  9.1* 9.5*  --  9.2* 9.4*  --  9.2* 8.9*   HCT 27.3*  --  27.1* 28.4*  --  28.3* 28.9*  --  28.6* 27.7*    < > = values in this interval not displayed.     Nutrition Orders:  Diet Heart Healthy Fluid - 1500mL; Standard Tray  Dietary nutrition supplements Daily; Boost Plus Nutritional Drink - Rich Chocolate    Appetite/Oral Intake: fair/50-75% of meals  Factors Affecting Nutritional Intake: decreased appetite and shortness of breath  Social Needs Impacting Access to Food: none identified  Food/Confucianist/Cultural Preferences: none reported  Food Allergies: none reported  Last Bowel Movement: 04/29/25  Wound(s):  none    Comments  (4/29) Pt sitting in chair during rounds ; eating peanut butter cookies; reported not liking food; wants gravy on meat; will honor request; ate eggs and sausage for breakfast; fair po intake; pt is drinking ONS. No new wt--in chair/unable to weigh. H/h(L)--consider fe : Tbili remains elevated. Pt remains on levophed.     (4/25) Pt tolerating oral diet; po intake remains fair; encouraged ONS; noted low K/Mg--suggest electrolyte repletion. Pt remains on levophed/pressor support. Tbili--elevated. Pt wt 196#; diuresis.     (4/22) Pt reported fair po intake; eating 50-75% meals; + LE edema; mild fat/muscle wasting; wt fluctuates with fluid; on diuretic; UBW between 205-225#. Pt willing to drink ONS; will order once daily --need to monitor fluid volume. Pt education complete on diet tx.     Anthropometrics    Height: 6' (182.9 cm), Height Method: Stated  Last Weight: 88.3 kg (194 lb 10.7 oz) (04/26/25 0547), Weight Method: Bed Scale  BMI (Calculated): 26.4  BMI Classification: overweight (BMI 25-29.9)        Ideal Body Weight (IBW), Male: 178  lb     % Ideal Body Weight, Male (lb): 119.66 %                 Usual Body Weight (UBW), k kg (wt fluctuates 205-225#; fluid)  % Usual Body Weight: 100     Usual Weight Provided By: patient and EMR weight history    Wt Readings from Last 5 Encounters:   25 88.3 kg (194 lb 10.7 oz)   25 102.1 kg (225 lb)   25 101.2 kg (223 lb)   25 102.5 kg (225 lb 14.4 oz)   02/15/25 103.6 kg (228 lb 6.3 oz)     Weight Change(s) Since Admission: -225#  Wt Readings from Last 1 Encounters:   25 0547 88.3 kg (194 lb 10.7 oz)   25 0600 89.1 kg (196 lb 6.9 oz)   25 0555 89.1 kg (196 lb 6.9 oz)   25 0502 87.8 kg (193 lb 9 oz)   25 0610 92.8 kg (204 lb 9.6 oz)   25 1709 96.6 kg (213 lb)   25 1137 97.5 kg (215 lb)   Admit Weight: 97.5 kg (215 lb) (25 1137), Weight Method: Stated    Estimated Needs    Weight Used For Calorie Calculations: 92.8 kg (204 lb 9.4 oz)  Energy Calorie Requirements (kcal): 2320 kcal/d; 25 tyron/kg  Energy Need Method: Kcal/kg  Weight Used For Protein Calculations: 92.8 kg (204 lb 9.4 oz)  Protein Requirements: 93 gm protein/d; 1 gm/kg  Fluid Requirements (mL): 2320 ml/d; 1ml/tyron        Enteral Nutrition     Patient not receiving enteral nutrition at this time.    Parenteral Nutrition     Patient not receiving parenteral nutrition support at this time.    Evaluation of Received Nutrient Intake    Calories: not meeting estimated needs; () po intake fair   Protein: not meeting estimated needs    Patient Education     Education Provided: heart healthy diet  Teaching Method: explanation and printed materials  Comprehension: verbalizes understanding  Barriers to Learning: none evident  Expected Compliance: fair  Comments: All questions were answered and dietitian's contact information was provided.     Nutrition Diagnosis     PES: Inadequate oral intake related to chronic illness as evidenced by eating 75% or less meals . (active)      PES: Moderate chronic disease or condition related malnutrition Related to chronic illness  As Evidenced by:  - energy intake: < 75% for 3 weeks (meets criteria for < 75% for > 7 days (moderate - acute)) - muscle mass depletion: 2 areas of mild muscle loss (Trapezius, Clavicle) - loss of subcutaneous fat: 3 areas of mild fat loss (Buccal, Triceps Skinfold, Infraorbital) - fluid accumulation: 1 area of mild fluid accumulation (Lower extremities edema) active    Nutrition Interventions     Intervention(s): modified composition of meals/snacks, multivitamin/mineral supplement therapy, purpose of nutrition education, and collaboration with other providers  Intervention(s): Oral nutritional supplement;Nutrition education;Care coordination or referral;Oral diet/nutrient modifications    Goal: Meet greater than 80% of nutritional needs by follow-up. (goal progressing)  Goal: Maintain weight throughout hospitalization. (goal progressing)    Nutrition Goals & Monitoring     Dietitian will monitor: food and beverage intake, weight, and food/nutrition knowledge skill  Discharge planning: continue heart healthy diet  Nutrition Risk/Follow-Up: high (follow-up in 1-4 days)   Please consult if re-assessment needed sooner.

## 2025-04-29 NOTE — PROGRESS NOTES
[facility] - ICU  Pulmonary Critical Care Note    Patient Name: Ran Reyes Jr.  MRN: [id number]  Admission Date: 4/21/2025  Hospital Length of Stay: 7 days  Code Status: Full Code  Attending Provider: Lennox Vinson MD  Primary Care Provider: Abiodun Recinos DO     Subjective:     HPI:   Ran Reyes Jr. is a 69 y.o.  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 ED (4/21/2025) due to generalized fatigue and weakness x 3-5 days. Patient reports he can only ambulate about 20-30 feet before having to stop due to claudication pain which is chronic and unchanged compared to baseline. Since running out of his diuretic medications approximately 5 days ago he has noted progressively worsening lower extremity swelling causing leg heaviness and weakness exacerbating difficulty ambulating.  He also notes acute on chronic cough productive of a brownish sputum without hemoptysis.  Denies fever, chills, sweats.  Denies chest pain.  Endorses shortness of breath at rest and with ambulation but unchanged compared to baseline.  Denies abdominal pain, nausea, vomiting, constipation, diarrhea.  Denies increased or decreased urinary frequency, dysuria, or hematuria.  Sleeps with 2 pillows at night due to baseline orthopnea. Has not had to increase number of pillows or change sleeping habits. Wears 2L NC home oxygen at baseline for hx of COPD. Denies having increased oxygen requirements prior to ED presentation.     ED course:  Vital signs stable.  Oxygenation stable on 2 L  nasal cannula.  CBC shows microcytic anemic (HGB 9.0; MCV 77).  CMP shows hyponatremia (Na 128), acidemia (CO2 14), elevated renal indices (BUN 25; creatinine 1.93), elevated alkaline phosphatase (). Troponin WNL.  BNP 26 39.5.  UDS negative.  EKG showed atrial fibrillation with PVCs.  Chest x-ray difficult to interpret due to overlying medical devices however appears to show cardiomegaly with bilateral pulmonary vascular congestion with question of bilateral pleural effusions. He was admitted for acute CHF exacerbation and cardiorenal syndrome.     Hospital Course/Significant events:  4/23/25: Admit to ICU    24 Hour Interval History:  NAEON. Afebrile. Patient's MAP noted mostly in the 70-80 yet remains on Levophed 0.037 mcg/kg/min currently.  CBC essentially stable. CMP stable. Cortisol 8 am done - normal but intermediate at 7.10. Discussed with nursing staff to wean off levophed for MAP goal of 65. Patient has no acute complaints. Working with PT/OT.       Past Medical History:   Diagnosis Date    A-fib     Anticoagulant long-term use     Anxiety     Aortic aneurysm     Cataract     CHF (congestive heart failure)     Chronic atrial fibrillation     COPD (chronic obstructive pulmonary disease)     Coronary artery disease     Depression     HLD (hyperlipidemia)     Hypertension     Mitral regurgitation     PAD (peripheral artery disease)     Primary open angle glaucoma (POAG)        Past Surgical History:   Procedure Laterality Date    ANGIOGRAM, CORONARY, WITH LEFT HEART CATHETERIZATION N/A 03/26/2024    Procedure: Angiogram, Coronary, with Left Heart Cath;  Surgeon: Adriano Montiel MD;  Location: [facility] CATH LAB;  Service: Cardiology;  Laterality: N/A;    ATHERECTOMY OF PERIPHERAL VESSEL Left 09/12/2022    LEFT SFA ATHERECTOMY, BALLOON ANGIOPLASTY    CATARACT EXTRACTION W/  INTRAOCULAR LENS IMPLANT Left 03/09/2023    Procedure: EXTRACTION, CATARACT, WITH IOL INSERTION;  Surgeon: Georgi Borja MD;   Location: [facility] OR;  Service: Ophthalmology;  Laterality: Left;  19.5  mac    EGD, WITH CLOSED BIOPSY  03/22/2024    Procedure: EGD, WITH CLOSED BIOPSY;  Surgeon: Ronald Calderon MD;  Location: [facility] ENDOSCOPY;  Service: Gastroenterology;;    ESOPHAGOGASTRODUODENOSCOPY N/A 03/22/2024    Procedure: EGD;  Surgeon: Ronald Calderon MD;  Location: [facility] ENDOSCOPY;  Service: Gastroenterology;  Laterality: N/A;    Heart Stent N/A     > 10yrs. AGO    INCISION AND DRAINAGE N/A 03/18/2024    Procedure: Incision and Drainage;  Surgeon: Fahad Rivas MD;  Location: [facility] OR;  Service: Urology;  Laterality: N/A;  I&D SCROTAL ABSCESS    INSERTION OF STENT INTO PERIPHERAL VESSEL Right 10/17/2022    RIGHT SFA ATHERECTOMY, BALLOON ANGIOPLASTY, STENT; RIGHT ANTERIOR TIBIAL ARTERY ATHERECTOMY, BALLOON ANGIOPLASTY    ORCHIECTOMY Left 03/18/2024    Procedure: ORCHIECTOMY;  Surgeon: Fahad Rivas MD;  Location: [facility];  Service: Urology;  Laterality: Left;       Social History[1]        Current Outpatient Medications   Medication Instructions    acetaminophen 650 mg, 2 times daily PRN    ALBUTEROL INHL 2 puffs, Every 6 hours PRN    albuterol-ipratropium (DUO-NEB) 2.5 mg-0.5 mg/3 mL nebulizer solution 3 mLs, Nebulization, Every 6 hours PRN, Rescue    aspirin (ECOTRIN) 81 MG EC tablet 1 tablet, Daily    atorvastatin (LIPITOR) 80 mg, Oral, Daily    BREZTRI AEROSPHERE 160-9-4.8 mcg/actuation HFAA 2 puffs, 2 times daily    cetirizine (ZYRTEC) 10 mg, Oral, Daily    clopidogreL (PLAVIX) 75 mg, Oral, Daily    folic acid (FOLVITE) 1 mg, Oral, Daily    furosemide (LASIX) 40 mg, Oral, 2 times daily    gabapentin (NEURONTIN) 300 mg, Oral, 3 times daily    metoprolol succinate (TOPROL-XL) 12.5 mg, Oral, Daily    nicotine (NICODERM CQ) 14 mg/24 hr 1 patch, Transdermal, Daily PRN    nicotine (NICODERM CQ) 21 mg/24 hr 1 patch, Transdermal, Daily    nitrofurantoin, macrocrystal-monohydrate, (MACROBID) 100 MG capsule  100 mg, Oral, 2 times daily    olopatadine (PATANOL) 0.1 % ophthalmic solution Apply to eye.    pantoprazole (PROTONIX) 40 mg, Oral, 2 times daily    potassium chloride SA (K-DUR,KLOR-CON) 20 MEQ tablet 20 mEq, Oral, 2 times daily    rivaroxaban (XARELTO) 20 mg, Oral, Daily    sacubitriL-valsartan (ENTRESTO) 49-51 mg per tablet 1 tablet, Daily    sertraline (ZOLOFT) 100 mg, Oral, Daily    urea (CARMOL) 40 % Crea Apply topically.    varenicline tartrate (CHANTIX) 0.5 mg    vitamin D (VITAMIN D3) 1,000 Units, Daily       Review of patient's allergies indicates:  No Known Allergies     Current Inpatient Medications   aspirin  81 mg Oral Daily    atorvastatin  80 mg Oral Daily    clopidogreL  75 mg Oral Daily    folic acid  1 mg Oral Daily    midodrine  10 mg Oral TID WM    pantoprazole  40 mg Oral Daily    piperacillin-tazobactam (Zosyn) IV (PEDS and ADULTS) (extended infusion is not appropriate)  4.5 g Intravenous Q8H    rivaroxaban  20 mg Oral Daily    vitamin D  1,000 Units Oral Daily       Current Intravenous Infusions   NORepinephrine bitartrate-D5W  0-3 mcg/kg/min Intravenous Continuous 12.2 mL/hr at 04/29/25 0514 0.037 mcg/kg/min at 04/29/25 0514         Review of Systems   Constitutional:  Negative for fever.   Eyes:  Negative for blurred vision.   Respiratory:  Negative for cough, shortness of breath and wheezing.    Cardiovascular:  Negative for chest pain, palpitations and leg swelling.   Gastrointestinal:  Negative for abdominal pain, constipation, diarrhea, nausea and vomiting.   Genitourinary:  Negative for dysuria, frequency and urgency.   Musculoskeletal:  Negative for back pain.   Skin:  Positive for itching (back - improved).   Neurological:  Negative for dizziness, speech change, focal weakness, weakness and headaches.          Objective:       Intake/Output Summary (Last 24 hours) at 4/29/2025 0840  Last data filed at 4/29/2025 0715  Gross per 24 hour   Intake 1077.88 ml   Output 650 ml   Net 427.88  ml         Vital Signs (Most Recent):  Temp: 98.2 °F (36.8 °C) (04/29/25 0715)  Pulse: 92 (04/29/25 0800)  Resp: (!) 22 (04/29/25 0800)  BP: (!) 89/72 (04/29/25 0800)  SpO2: 96 % (04/29/25 0800)  Body mass index is 26.4 kg/m².  Weight: 88.3 kg (194 lb 10.7 oz) Vital Signs (24h Range):  Temp:  [97.7 °F (36.5 °C)-98.3 °F (36.8 °C)] 98.2 °F (36.8 °C)  Pulse:  [] 92  Resp:  [12-28] 22  SpO2:  [81 %-100 %] 96 %  BP: ()/(50-86) 89/72     Physical Exam  Vitals reviewed.   Constitutional:       General: He is not in acute distress.     Appearance: He is not ill-appearing.   HENT:      Head: Normocephalic and atraumatic.      Right Ear: External ear normal.      Left Ear: External ear normal.   Eyes:      General: No scleral icterus.     Pupils: Pupils are equal, round, and reactive to light.   Cardiovascular:      Rate and Rhythm: Normal rate. Rhythm irregular.      Pulses: Normal pulses.      Heart sounds: Normal heart sounds. No murmur heard.     No friction rub. No gallop.   Pulmonary:      Effort: Pulmonary effort is normal. No respiratory distress.      Breath sounds: No stridor. No wheezing, rhonchi or rales.   Abdominal:      General: Bowel sounds are normal. There is no distension.      Palpations: Abdomen is soft.      Tenderness: There is abdominal tenderness (LLQ to deep palpation - imprving). There is no guarding.   Musculoskeletal:         General: Normal range of motion.      Right lower leg: No edema.      Left lower leg: No edema.   Skin:     General: Skin is warm and dry.      Coloration: Skin is not jaundiced.      Findings: No bruising, erythema or rash.      Comments: Dry skin to back with scratch marks but no rash or lesions   Neurological:      Mental Status: He is alert.      Comments: AAO to person, place, time, and situation; CN II-XII grossly intact   Psychiatric:         Mood and Affect: Mood normal.         Behavior: Behavior normal.           Lines/Drains/Airways       Peripherally  "Inserted Central Catheter Line  Duration             PICC Triple Lumen 04/24/25 0942 right brachial 4 days              Peripheral Intravenous Line  Duration                  Peripheral IV - Single Lumen 04/22/25 0000 20 G Left;Posterior Forearm 7 days                    Significant Labs:    Lab Results   Component Value Date    WBC 5.54 04/29/2025    HGB 8.9 (L) 04/29/2025    HCT 27.7 (L) 04/29/2025    MCV 75.5 (L) 04/29/2025     04/29/2025           BMP  Lab Results   Component Value Date     04/29/2025    K 3.9 04/29/2025    CO2 28 04/29/2025    BUN 19.0 04/29/2025    CREATININE 0.89 04/29/2025    CALCIUM 9.4 04/29/2025    AGAP 11.0 04/29/2025    EGFRNONAA 88 (L) 12/06/2021         ABG  No results for input(s): "PH", "PO2", "PCO2", "HCO3", "POCBASEDEF" in the last 168 hours.      Mechanical Ventilation Support:  Oxygen Concentration (%): 28 (04/22/25 0739)      Significant Imaging:  I have reviewed the pertinent imaging within the past 24 hours.        Assessment/Plan:     Assessment  Septic shock likely from ESBL E Coli UTI  Remains on vasopressor; cortisol test normal but intermediate  Cardiogenic shock - improved  Acute on chronic CHF exacerbation  Chronic persistent atrial fibrillation  Nonobstructive coronary artery disease  Nonischemic cardiomyopathy  Heart failure with with reduced ejection fraction (EF 30%); NYHA Class III Stage C  Hx of VT arrest  Pulmonary hypertension, likely combined group II and III  PVD  ELIZABETH  COPD - 2 L supplemental oxygen dependent at home  On room air and doing well  Tobacco dependence  Microcytic anemia  Dyselectrolytemia   Prolonged QTC        Plan  Continue ongoing care in the ICU  Continue with Levophed and wean as tolerated for MAP goal of 65  Strict I&O and daily weight  Hold off on further IV Diuresis and GDMT  Blood culture x 2 - NGTD  Continue with IV Zosyn   Continue with Xarelto 20 mg daily  Continue DAPT and Statin  Will replete electrolytes, Keep K >4, " Phos 3 and Mg >2   Will continue with Vistaril prn and hydrocortisone cream. Avoid Benadryl given prolonged QTC.  Continue with Midodrine 10 mg TID  Will order Shaheed simulation test for tomorrow    DVT Prophylaxis: Xarelto  GI Prophylaxis: PPI     32 minutes of critical care was time spent personally by me on the following activities: development of treatment plan with patient or surrogate and bedside caregivers, discussions with consultants, evaluation of patient's response to treatment, examination of patient, ordering and performing treatments and interventions, ordering and review of laboratory studies, ordering and review of radiographic studies, pulse oximetry, re-evaluation of patient's condition.  This critical care time did not overlap with that of any other provider or involve time for any procedures.     Valerio Shrestha MD  Pulmonary Critical Care Medicine  [facility] - ICU  DOS: 04/29/2025          [1]   Social History  Socioeconomic History    Marital status: Single   Tobacco Use    Smoking status: Every Day     Current packs/day: 0.50     Average packs/day: 0.8 packs/day for 50.3 years (40.9 ttl pk-yrs)     Types: Cigarettes     Start date: 1975     Passive exposure: Current    Smokeless tobacco: Never    Tobacco comments:     States smoke 2-3 cigarettes per day   Substance and Sexual Activity    Alcohol use: Yes     Alcohol/week: 3.0 standard drinks of alcohol     Types: 3 Cans of beer per week     Comment: socially    Drug use: Not Currently     Frequency: 1.0 times per week     Types: Marijuana     Comment: no use in over 1 year    Sexual activity: Not Currently     Partners: Female     Social Drivers of Health     Financial Resource Strain: Low Risk  (4/23/2025)    Overall Financial Resource Strain (CARDIA)     Difficulty of Paying Living Expenses: Not hard at all   Food Insecurity: No Food Insecurity (4/23/2025)    Hunger Vital Sign     Worried About Running Out of Food in the Last Year:  Never true     Ran Out of Food in the Last Year: Never true   Transportation Needs: No Transportation Needs (4/23/2025)    PRAPARE - Transportation     Lack of Transportation (Medical): No     Lack of Transportation (Non-Medical): No   Physical Activity: Inactive (12/17/2024)    Exercise Vital Sign     Days of Exercise per Week: 0 days     Minutes of Exercise per Session: 0 min   Stress: No Stress Concern Present (4/23/2025)    [employer] - Occupational Stress Questionnaire     Feeling of Stress : Not at all   Housing Stability: Low Risk  (4/23/2025)    Housing Stability Vital Sign     Unable to Pay for Housing in the Last Year: No     Homeless in the Last Year: No

## 2025-04-29 NOTE — PLAN OF CARE
Problem: Skin Injury Risk Increased  Goal: Skin Health and Integrity  Outcome: Progressing     Problem: Adult Inpatient Plan of Care  Goal: Plan of Care Review  Outcome: Progressing  Goal: Absence of Hospital-Acquired Illness or Injury  Outcome: Progressing  Goal: Optimal Comfort and Wellbeing  Outcome: Progressing     Problem: COPD (Chronic Obstructive Pulmonary Disease)  Goal: Optimal Chronic Illness Coping  Outcome: Progressing  Goal: Optimal Level of Functional [place]  Outcome: Progressing  Goal: Absence of Infection Signs and Symptoms  Outcome: Progressing  Goal: Improved Oral Intake  Outcome: Progressing     Problem: Heart Failure  Goal: Optimal Coping  Outcome: Progressing  Goal: Stable Heart Rate and Rhythm  Outcome: Progressing  Goal: Optimal Functional Ability  Outcome: Progressing  Goal: Fluid and Electrolyte Balance  Outcome: Progressing  Goal: Improved Oral Intake  Outcome: Progressing  Goal: Effective Breathing Pattern During Sleep  Outcome: Progressing     Problem: Fall Injury Risk  Goal: Absence of Fall and Fall-Related Injury  Outcome: Progressing     Problem: Wound  Goal: Optimal Coping  Outcome: Progressing  Goal: Optimal Functional Ability  Outcome: Progressing  Goal: Absence of Infection Signs and Symptoms  Outcome: Progressing  Goal: Improved Oral Intake  Outcome: Progressing  Goal: Optimal Pain Control and Function  Outcome: Progressing  Goal: Skin Health and Integrity  Outcome: Progressing  Goal: Optimal Wound Healing  Outcome: Progressing     Problem: Acute Kidney Injury/Impairment  Goal: Fluid and Electrolyte Balance  Outcome: Progressing  Goal: Improved Oral Intake  Outcome: Progressing  Goal: Effective Renal Function  Outcome: Progressing     Problem: Dysrhythmia  Goal: Normalized Cardiac Rhythm  Outcome: Progressing     Problem: Infection  Goal: Absence of Infection Signs and Symptoms  Outcome: Progressing     Problem: Adult Inpatient Plan of Care  Goal: Patient-Specific Goal  (Individualized)  Outcome: Not Progressing  Goal: Readiness for Transition of Care  Outcome: Not Progressing     Problem: Heart Failure  Goal: Optimal Cardiac Output  Outcome: Not Progressing

## 2025-04-29 NOTE — PT/OT/SLP PROGRESS
Physical Therapy    Missed Treatment Session - cancel note - 1617 - 04/29/2025    Patient Name:  Ran Reyes Jr.   MRN:  [id number]      -patient not seen at this time secondary to patient not available  -patient side lying right and did not awaken to knock on door or respond to name being called  -patients nurse Jaimee notified  -will follow-up as patient is appropriate/available/agreeable to participate and as therapists' schedule allows.

## 2025-04-30 LAB
ALBUMIN SERPL-MCNC: 3.3 G/DL (ref 3.4–4.8)
ALBUMIN/GLOB SERPL: 0.8 RATIO (ref 1.1–2)
ALP SERPL-CCNC: 184 UNIT/L (ref 40–150)
ALT SERPL-CCNC: 20 UNIT/L (ref 0–55)
ANION GAP SERPL CALC-SCNC: 12 MEQ/L
AST SERPL-CCNC: 42 UNIT/L (ref 11–45)
BASOPHILS # BLD AUTO: 0.05 X10(3)/MCL
BASOPHILS NFR BLD AUTO: 0.8 %
BILIRUB SERPL-MCNC: 3.8 MG/DL
BNP BLD-MCNC: 2823 PG/ML
BUN SERPL-MCNC: 17.8 MG/DL (ref 8.4–25.7)
CALCIUM SERPL-MCNC: 9.9 MG/DL (ref 8.8–10)
CHLORIDE SERPL-SCNC: 98 MMOL/L (ref 98–107)
CO2 SERPL-SCNC: 28 MMOL/L (ref 23–31)
CORTIS 1H P CHAL SERPL-SCNC: 11.3 UG/DL
CORTIS 30M P CHAL SERPL-SCNC: 10.9 UG/DL
CORTIS SERPL-SCNC: 7.5 UG/DL
CREAT SERPL-MCNC: 0.97 MG/DL (ref 0.72–1.25)
CREAT/UREA NIT SERPL: 18
EOSINOPHIL # BLD AUTO: 0.16 X10(3)/MCL (ref 0–0.9)
EOSINOPHIL NFR BLD AUTO: 2.4 %
ERYTHROCYTE [DISTWIDTH] IN BLOOD BY AUTOMATED COUNT: 24.4 % (ref 11.5–17)
GFR SERPLBLD CREATININE-BSD FMLA CKD-EPI: >60 ML/MIN/1.73/M2
GLOBULIN SER-MCNC: 4.3 GM/DL (ref 2.4–3.5)
GLUCOSE SERPL-MCNC: 106 MG/DL (ref 82–115)
HCT VFR BLD AUTO: 27.8 % (ref 42–52)
HGB BLD-MCNC: 8.9 G/DL (ref 14–18)
IMM GRANULOCYTES # BLD AUTO: 0.02 X10(3)/MCL (ref 0–0.04)
IMM GRANULOCYTES NFR BLD AUTO: 0.3 %
LYMPHOCYTES # BLD AUTO: 1.34 X10(3)/MCL (ref 0.6–4.6)
LYMPHOCYTES NFR BLD AUTO: 20.2 %
MAGNESIUM SERPL-MCNC: 1.7 MG/DL (ref 1.6–2.6)
MCH RBC QN AUTO: 24.4 PG (ref 27–31)
MCHC RBC AUTO-ENTMCNC: 32 G/DL (ref 33–36)
MCV RBC AUTO: 76.2 FL (ref 80–94)
MONOCYTES # BLD AUTO: 0.93 X10(3)/MCL (ref 0.1–1.3)
MONOCYTES NFR BLD AUTO: 14 %
NEUTROPHILS # BLD AUTO: 4.12 X10(3)/MCL (ref 2.1–9.2)
NEUTROPHILS NFR BLD AUTO: 62.3 %
NRBC BLD AUTO-RTO: 0 %
PHOSPHATE SERPL-MCNC: 3 MG/DL (ref 2.3–4.7)
PLATELET # BLD AUTO: 156 X10(3)/MCL (ref 130–400)
PLATELETS.RETICULATED NFR BLD AUTO: 2.9 % (ref 0.9–11.2)
PMV BLD AUTO: ABNORMAL FL
POTASSIUM SERPL-SCNC: 3.8 MMOL/L (ref 3.5–5.1)
PROT SERPL-MCNC: 7.6 GM/DL (ref 5.8–7.6)
RBC # BLD AUTO: 3.65 X10(6)/MCL (ref 4.7–6.1)
SODIUM SERPL-SCNC: 138 MMOL/L (ref 136–145)
WBC # BLD AUTO: 6.62 X10(3)/MCL (ref 4.5–11.5)

## 2025-04-30 PROCEDURE — 25000003 PHARM REV CODE 250

## 2025-04-30 PROCEDURE — 84100 ASSAY OF PHOSPHORUS: CPT

## 2025-04-30 PROCEDURE — 80053 COMPREHEN METABOLIC PANEL: CPT

## 2025-04-30 PROCEDURE — 82533 TOTAL CORTISOL: CPT

## 2025-04-30 PROCEDURE — 63600175 PHARM REV CODE 636 W HCPCS

## 2025-04-30 PROCEDURE — 36415 COLL VENOUS BLD VENIPUNCTURE: CPT

## 2025-04-30 PROCEDURE — 94761 N-INVAS EAR/PLS OXIMETRY MLT: CPT

## 2025-04-30 PROCEDURE — 83880 ASSAY OF NATRIURETIC PEPTIDE: CPT

## 2025-04-30 PROCEDURE — 83735 ASSAY OF MAGNESIUM: CPT

## 2025-04-30 PROCEDURE — 20000000 HC ICU ROOM

## 2025-04-30 PROCEDURE — 85025 COMPLETE CBC W/AUTO DIFF WBC: CPT

## 2025-04-30 PROCEDURE — 27000207 HC ISOLATION

## 2025-04-30 PROCEDURE — 97530 THERAPEUTIC ACTIVITIES: CPT

## 2025-04-30 RX ORDER — COSYNTROPIN 0.25 MG/ML
0.25 INJECTION, POWDER, FOR SOLUTION INTRAMUSCULAR; INTRAVENOUS ONCE
Status: COMPLETED | OUTPATIENT
Start: 2025-04-30 | End: 2025-04-30

## 2025-04-30 RX ORDER — FUROSEMIDE 10 MG/ML
20 INJECTION INTRAMUSCULAR; INTRAVENOUS EVERY 12 HOURS
Status: DISCONTINUED | OUTPATIENT
Start: 2025-04-30 | End: 2025-04-30

## 2025-04-30 RX ADMIN — COSYNTROPIN 0.25 MG: 0.25 INJECTION, POWDER, LYOPHILIZED, FOR SOLUTION INTRAMUSCULAR; INTRAVENOUS at 07:04

## 2025-04-30 RX ADMIN — MIDODRINE HYDROCHLORIDE 10 MG: 5 TABLET ORAL at 05:04

## 2025-04-30 RX ADMIN — FUROSEMIDE 20 MG: 10 INJECTION, SOLUTION INTRAMUSCULAR; INTRAVENOUS at 09:04

## 2025-04-30 RX ADMIN — PANTOPRAZOLE SODIUM 40 MG: 40 TABLET, DELAYED RELEASE ORAL at 09:04

## 2025-04-30 RX ADMIN — Medication 1000 UNITS: at 09:04

## 2025-04-30 RX ADMIN — MIDODRINE HYDROCHLORIDE 10 MG: 5 TABLET ORAL at 12:04

## 2025-04-30 RX ADMIN — HYDROXYZINE PAMOATE 25 MG: 25 CAPSULE ORAL at 12:04

## 2025-04-30 RX ADMIN — PIPERACILLIN AND TAZOBACTAM 4.5 G: 4; .5 INJECTION, POWDER, LYOPHILIZED, FOR SOLUTION INTRAVENOUS; PARENTERAL at 01:04

## 2025-04-30 RX ADMIN — FOLIC ACID 1 MG: 1 TABLET ORAL at 09:04

## 2025-04-30 RX ADMIN — NOREPINEPHRINE BITARTRATE 0.02 MCG/KG/MIN: 4 INJECTION, SOLUTION INTRAVENOUS at 05:04

## 2025-04-30 RX ADMIN — RIVAROXABAN 20 MG: 10 TABLET, FILM COATED ORAL at 05:04

## 2025-04-30 RX ADMIN — CLOPIDOGREL BISULFATE 75 MG: 75 TABLET, FILM COATED ORAL at 09:04

## 2025-04-30 RX ADMIN — ASPIRIN 81 MG: 81 TABLET, COATED ORAL at 07:04

## 2025-04-30 RX ADMIN — HYDROXYZINE PAMOATE 25 MG: 25 CAPSULE ORAL at 10:04

## 2025-04-30 RX ADMIN — PIPERACILLIN AND TAZOBACTAM 4.5 G: 4; .5 INJECTION, POWDER, LYOPHILIZED, FOR SOLUTION INTRAVENOUS; PARENTERAL at 06:04

## 2025-04-30 RX ADMIN — HYDROCORTISONE: 10 CREAM TOPICAL at 10:04

## 2025-04-30 RX ADMIN — PIPERACILLIN AND TAZOBACTAM 4.5 G: 4; .5 INJECTION, POWDER, LYOPHILIZED, FOR SOLUTION INTRAVENOUS; PARENTERAL at 10:04

## 2025-04-30 RX ADMIN — ATORVASTATIN CALCIUM 80 MG: 40 TABLET, FILM COATED ORAL at 09:04

## 2025-04-30 RX ADMIN — MIDODRINE HYDROCHLORIDE 10 MG: 5 TABLET ORAL at 07:04

## 2025-04-30 RX ADMIN — PROCHLORPERAZINE EDISYLATE 5 MG: 5 INJECTION INTRAMUSCULAR; INTRAVENOUS at 03:04

## 2025-04-30 NOTE — PLAN OF CARE
Problem: Adult Inpatient Plan of Care  Goal: Readiness for Transition of Care  Outcome: Progressing     Problem: Heart Failure  Goal: Optimal Coping  Outcome: Progressing  Goal: Stable Heart Rate and Rhythm  Outcome: Progressing     Problem: Heart Failure  Goal: Effective Breathing Pattern During Sleep  Outcome: Met

## 2025-04-30 NOTE — PLAN OF CARE
Problem: COPD (Chronic Obstructive Pulmonary Disease)  Goal: Optimal Chronic Illness Coping  Outcome: Progressing  Goal: Optimal Level of Functional [place]  Outcome: Progressing     Problem: Heart Failure  Goal: Optimal Cardiac Output  Outcome: Progressing  Goal: Fluid and Electrolyte Balance  Outcome: Progressing  Goal: Effective Breathing Pattern During Sleep  Outcome: Progressing     Problem: Heart Failure  Goal: Stable Heart Rate and Rhythm  Outcome: Not Progressing

## 2025-04-30 NOTE — PT/OT/SLP PROGRESS
Physical Therapy Treatment    Patient Name:  Ran Reyes Jr.   MRN:  [id number]    Recommendations     Therapy Intensity Recommendations at Discharge: No Therapy Indicated  Discharge Equipment Recommendations: shower chair, rollator, hospital bed (life vest)   Barriers to discharge: decreased endurance    Assessment     Ran Reyes Jr. is a 69 y.o. male admitted with a medical diagnosis of:  1. ELIZABETH (acute kidney injury)    2. Weakness    3. Dyspnea    4. Screening due    5. HFrEF (heart failure with reduced ejection fraction)    6. Acidemia    7. Microcytic anemia    8. Tobacco dependency    9. Acute on chronic combined systolic and diastolic congestive heart failure    10. Bradycardia    11. QT prolongation    12. Heart failure with reduced ejection fraction    13. Heart failure       Problem List[1]   He presents with the following impairments/functional limitations:  weakness, impaired endurance, impaired cardiopulmonary response to activity, impaired self care skills, impaired functional mobility, gait instability.    Rehab Prognosis: Good.    Patient would benefit from continued skilled acute PT services to: address above listed impairments/functional limitations; receive patient/caregiver education; reduce fall risk; and maximize independency/safety with functional mobility.    -continued: up-to-chair, ambulation, with progression of gait distance/frequency/duration and speed, as tolerated/appropriate, with assistance and supervision    Recent Surgery: * No surgery found *      Plan     During this hospitalization, patient to be seen 5 x/week to address the identified impairments/functional limitations via gait training, therapeutic exercises and progress toward the established goals.    Plan of Care Expires:  05/24/25    Subjective     Communicated with patient's nurse Jaimee prior to session.    Patient agreeable to participate in treatment session.    Chief Complaint: back pain  Patient/Family  Comments/goals: home w/ return to highest PLOF  Pain/Comfort:  Pain Rating 1: 8/10  Location - Orientation 1: lower  Location 1: back  Pain Addressed 1: Cessation of Activity, Reposition  Pain Rating Post-Intervention 1: 8/10    Objective     Patient found supine in bed, with HOB elevated, and with bed rails up bilateral HOB and right FOB with pulse ox (continuous), telemetry, blood pressure cuff, PICC line (life vest (not on patient))  upon PT entry to room.    General Precautions: Standard, fall, contact   Orthopedic Precautions:N/A   Braces: N/A  Respiratory Status: room air  SAT O2 Check: n/a    Functional Mobility:    Bed Mobility:  Rolling Left: supervision  Scooting: supervision  Supine to Sit: supervision  with no cues required  with HOB elevated, hand rail, and firm mattress    Transfers:  Sit to Stand: contact guard assistance with hand-held assist  Stand to Sit: contact guard assistance with hand-held assist  with no cues required  with firm mattress    Gait:  Patient fwd/bwd/side steps from edge of bed to bedside commode at FOB  Patient fwd/bwd/side steps from bedside commode at FOB to edge of bed  Patient fwd/bwd/side steps from edge of bed to bedside chair at HOB  with no assistive device and hand-held assist and contact guard assistance.  Patient demonstrates :       unsteady gait       decreased bilateral step length       wide base of support       decreased bilateral foot/floor clearance       shortened/quickened step on bilateral       flexed posture       antalgic gait       slowed/guarded/time consuming mvmts - all transitional activities  *patient declined out of room ambulation at this time    Other Mobility:  Therapeutic Activities performed:        -sitting balance activities:              weight shifting:                   -forward                 -backward                 -laterally (left)                 -laterally (right)            scooting:                   -forward                  -backward            repositioning at edge of bed            prior to bedside commode use patient donned bilateral shoes w/ minimal assist from therapist            after bedside commode use-patient doffed soiled gown and donned clean gown       -standing balance activities:              weight shifting:                   -laterally (left)                 -laterally (right)            side steps            partially doffed brief to redirect telemetry line/wires    Balance:  Sit  Patient demonstrated static balance on level surface with independence with no verbal cues.  Patient demonstrated dynamic balance on level surface with supervision with no verbal cues during moderate excursions.  Stand  Patient demonstrated static balance on level surface without device with contact guard assist HHA with no verbal cues    Patient left sitting in chair with pulse ox (continuous), blood pressure cuff, w/ telemetry monitoring on standby 2/2 wires soiled (patients nurse notified) PICC line (life vest (not on patient) with all lines intact, call button in reach, tray table at bedside, bedside commode at bedside, and patient's nurse notified.    DME Justifications:  No DME recommended requiring DME justifications    Education     Patient educated on and assisted with functional mobility as noted above.  Patient educated on PT Plan of Care  Patient was instructed to utilize staff assistance for mobility/transfers.  White board updated regarding patient's safest level of mobility with staff assistance.    Goals     Multidisciplinary Problems       Physical Therapy Goals       Problem: Physical Therapy    Goal Priority Disciplines Outcome Interventions   Physical Therapy Goal     PT, PT/OT Progressing    Description: REVIEWED 2025  Goals to be met by: DISCHARGE  Patient will increase functional independence with mobility by performin. Sit <=> stand w/ RW at Elizabeth. - ONGOING  2. Bed <=> chair w/ RW at Elizabeth. - ONGOING  3.  Ambulate 130' w/ RW at Elizabeth. - ONGOING           Time Tracking     PT Received On: 04/30/25  PT Start Time: 0931     PT Stop Time: 0955  PT Total Time (min): 24 min     Billable Minutes: Therapeutic Activity 24  Non-Billable Minutes: N/A  04/30/2025       [1]   Patient Active Problem List  Diagnosis    Primary open angle glaucoma (POAG) of right eye, moderate stage    Combined forms of age-related cataract of left eye    Arteriosclerosis of coronary artery    Chronic atrial fibrillation    Dyslipidemia    Hypertension    Tobacco use    PVD (peripheral vascular disease)    VHD (valvular heart disease)    COVID-19    HFrEF (heart failure with reduced ejection fraction)    Nonrheumatic mitral valve regurgitation    Postoperative eye state    Scrotal hematoma    Chronic obstructive pulmonary disease, unspecified    Lesion of external ear    Low back pain    Other thrombophilia    Secondary hyperaldosteronism    Positive colorectal cancer screening using Cologuard test    Acute heart failure    History of COPD    CHF (congestive heart failure)    Acute cystitis with hematuria    Tobacco dependency    Moderate malnutrition

## 2025-04-30 NOTE — NURSING
Updated Dr. Napoles via secure chat, that the drip remains off, MAP, for the most part, > 65 but still has  some < 65. Stated will keep him ICU overnight, keep drip off but restart if needed.

## 2025-04-30 NOTE — PROGRESS NOTES
[facility] - ICU  Pulmonary Critical Care Note    Patient Name: Ran Reyes Jr.  MRN: [id number]  Admission Date: 4/21/2025  Hospital Length of Stay: 8 days  Code Status: Full Code  Attending Provider: Lennox Vinson MD  Primary Care Provider: Abiodun Recinos DO     Subjective:     HPI:   Ran Reyes Jr. is a 69 y.o.  male with PMH of tobacco dependence, chronic obstructive pulmonary disease, hypertension, pulmonary hypertension, hyperlipidemia, chronic persistent atrial fibrillation on Xarelto, nonobstructive coronary artery disease, nonischemic cardiomyopathy, heart failure with with reduced ejection fraction (EF 30%), peripheral vascular disease s/p stenting to superficial femoral artery and right tibial artery, renée's gangrene (03/2024), primary open-angle glaucoma, who presented to [facility] ED (4/21/2025) due to generalized fatigue and weakness x 3-5 days. Patient reports he can only ambulate about 20-30 feet before having to stop due to claudication pain which is chronic and unchanged compared to baseline. Since running out of his diuretic medications approximately 5 days ago he has noted progressively worsening lower extremity swelling causing leg heaviness and weakness exacerbating difficulty ambulating.  He also notes acute on chronic cough productive of a brownish sputum without hemoptysis.  Denies fever, chills, sweats.  Denies chest pain.  Endorses shortness of breath at rest and with ambulation but unchanged compared to baseline.  Denies abdominal pain, nausea, vomiting, constipation, diarrhea.  Denies increased or decreased urinary frequency, dysuria, or hematuria.  Sleeps with 2 pillows at night due to baseline orthopnea. Has not had to increase number of pillows or change sleeping habits. Wears 2L NC home oxygen at baseline for hx of COPD. Denies having increased oxygen requirements prior to ED presentation.     ED course:  Vital signs stable.  Oxygenation stable on 2 L  nasal cannula.  CBC shows microcytic anemic (HGB 9.0; MCV 77).  CMP shows hyponatremia (Na 128), acidemia (CO2 14), elevated renal indices (BUN 25; creatinine 1.93), elevated alkaline phosphatase (). Troponin WNL.  BNP 26 39.5.  UDS negative.  EKG showed atrial fibrillation with PVCs.  Chest x-ray difficult to interpret due to overlying medical devices however appears to show cardiomegaly with bilateral pulmonary vascular congestion with question of bilateral pleural effusions. He was admitted for acute CHF exacerbation and cardiorenal syndrome.     Hospital Course/Significant events:  4/23/25: Admit to ICU    24 Hour Interval History:  NAMARLON, patient MAPs have been up to 90s this AM and he had been weaned to 0.02 levophed. Patient has been somewhat dyspneic and with increased work of breathing since yesterday, but O2 sat remains 94% on RA. Patient also complaining of mild burning abdominal pain with some nausea.     Past Medical History:   Diagnosis Date    A-fib     Anticoagulant long-term use     Anxiety     Aortic aneurysm     Cataract     CHF (congestive heart failure)     Chronic atrial fibrillation     COPD (chronic obstructive pulmonary disease)     Coronary artery disease     Depression     HLD (hyperlipidemia)     Hypertension     Mitral regurgitation     PAD (peripheral artery disease)     Primary open angle glaucoma (POAG)        Past Surgical History:   Procedure Laterality Date    ANGIOGRAM, CORONARY, WITH LEFT HEART CATHETERIZATION N/A 03/26/2024    Procedure: Angiogram, Coronary, with Left Heart Cath;  Surgeon: Adriano Montiel MD;  Location: [facility] CATH LAB;  Service: Cardiology;  Laterality: N/A;    ATHERECTOMY OF PERIPHERAL VESSEL Left 09/12/2022    LEFT SFA ATHERECTOMY, BALLOON ANGIOPLASTY    CATARACT EXTRACTION W/  INTRAOCULAR LENS IMPLANT Left 03/09/2023    Procedure: EXTRACTION, CATARACT, WITH IOL INSERTION;  Surgeon: Georgi Borja MD;  Location: [facility];  Service: Ophthalmology;   Laterality: Left;  19.5  mac    EGD, WITH CLOSED BIOPSY  03/22/2024    Procedure: EGD, WITH CLOSED BIOPSY;  Surgeon: Ronald Calderon MD;  Location: [facility] ENDOSCOPY;  Service: Gastroenterology;;    ESOPHAGOGASTRODUODENOSCOPY N/A 03/22/2024    Procedure: EGD;  Surgeon: Ronald Calderon MD;  Location: [facility] ENDOSCOPY;  Service: Gastroenterology;  Laterality: N/A;    Heart Stent N/A     > 10yrs. AGO    INCISION AND DRAINAGE N/A 03/18/2024    Procedure: Incision and Drainage;  Surgeon: Fahad Rivas MD;  Location: [facility] OR;  Service: Urology;  Laterality: N/A;  I&D SCROTAL ABSCESS    INSERTION OF STENT INTO PERIPHERAL VESSEL Right 10/17/2022    RIGHT SFA ATHERECTOMY, BALLOON ANGIOPLASTY, STENT; RIGHT ANTERIOR TIBIAL ARTERY ATHERECTOMY, BALLOON ANGIOPLASTY    ORCHIECTOMY Left 03/18/2024    Procedure: ORCHIECTOMY;  Surgeon: Fahad Rivas MD;  Location: [facility];  Service: Urology;  Laterality: Left;       Social History[1]        Current Outpatient Medications   Medication Instructions    acetaminophen 650 mg, 2 times daily PRN    ALBUTEROL INHL 2 puffs, Every 6 hours PRN    albuterol-ipratropium (DUO-NEB) 2.5 mg-0.5 mg/3 mL nebulizer solution 3 mLs, Nebulization, Every 6 hours PRN, Rescue    aspirin (ECOTRIN) 81 MG EC tablet 1 tablet, Daily    atorvastatin (LIPITOR) 80 mg, Oral, Daily    BREZTRI AEROSPHERE 160-9-4.8 mcg/actuation HFAA 2 puffs, 2 times daily    cetirizine (ZYRTEC) 10 mg, Oral, Daily    clopidogreL (PLAVIX) 75 mg, Oral, Daily    folic acid (FOLVITE) 1 mg, Oral, Daily    furosemide (LASIX) 40 mg, Oral, 2 times daily    gabapentin (NEURONTIN) 300 mg, Oral, 3 times daily    metoprolol succinate (TOPROL-XL) 12.5 mg, Oral, Daily    nicotine (NICODERM CQ) 14 mg/24 hr 1 patch, Transdermal, Daily PRN    nicotine (NICODERM CQ) 21 mg/24 hr 1 patch, Transdermal, Daily    nitrofurantoin, macrocrystal-monohydrate, (MACROBID) 100 MG capsule 100 mg, Oral, 2 times daily    olopatadine  (PATANOL) 0.1 % ophthalmic solution Apply to eye.    pantoprazole (PROTONIX) 40 mg, Oral, 2 times daily    potassium chloride SA (K-DUR,KLOR-CON) 20 MEQ tablet 20 mEq, Oral, 2 times daily    rivaroxaban (XARELTO) 20 mg, Oral, Daily    sacubitriL-valsartan (ENTRESTO) 49-51 mg per tablet 1 tablet, Daily    sertraline (ZOLOFT) 100 mg, Oral, Daily    urea (CARMOL) 40 % Crea Apply topically.    varenicline tartrate (CHANTIX) 0.5 mg    vitamin D (VITAMIN D3) 1,000 Units, Daily       Review of patient's allergies indicates:  No Known Allergies     Current Inpatient Medications   aspirin  81 mg Oral Daily    atorvastatin  80 mg Oral Daily    clopidogreL  75 mg Oral Daily    folic acid  1 mg Oral Daily    midodrine  10 mg Oral TID WM    pantoprazole  40 mg Oral Daily    piperacillin-tazobactam (Zosyn) IV (PEDS and ADULTS) (extended infusion is not appropriate)  4.5 g Intravenous Q8H    rivaroxaban  20 mg Oral Daily    vitamin D  1,000 Units Oral Daily       Current Intravenous Infusions   NORepinephrine bitartrate-D5W  0-3 mcg/kg/min Intravenous Continuous 6.6 mL/hr at 04/30/25 0525 0.02 mcg/kg/min at 04/30/25 0525         Review of Systems   Constitutional:  Negative for fever.   Eyes:  Negative for blurred vision.   Respiratory:  Negative for cough, shortness of breath and wheezing.    Cardiovascular:  Negative for chest pain, palpitations and leg swelling.   Gastrointestinal:  Negative for abdominal pain, constipation, diarrhea, nausea and vomiting.   Genitourinary:  Negative for dysuria, frequency and urgency.   Musculoskeletal:  Negative for back pain.   Skin:  Positive for itching (back - improved).   Neurological:  Negative for dizziness, speech change, focal weakness, weakness and headaches.          Objective:       Intake/Output Summary (Last 24 hours) at 4/30/2025 0741  Last data filed at 4/29/2025 1700  Gross per 24 hour   Intake 680 ml   Output 475 ml   Net 205 ml         Vital Signs (Most Recent):  Temp: 98.2  °F (36.8 °C) (04/30/25 0330)  Pulse: 99 (04/30/25 0630)  Resp: (!) 22 (04/30/25 0630)  BP: 95/63 (04/30/25 0630)  SpO2: (!) 94 % (04/30/25 0600)  Body mass index is 26.4 kg/m².  Weight: 88.3 kg (194 lb 10.7 oz) Vital Signs (24h Range):  Temp:  [97.7 °F (36.5 °C)-98.2 °F (36.8 °C)] 98.2 °F (36.8 °C)  Pulse:  [] 99  Resp:  [12-32] 22  SpO2:  [87 %-100 %] 94 %  BP: ()/(33-82) 95/63     Physical Exam  Vitals reviewed.   Constitutional:       General: He is not in acute distress.     Appearance: He is not ill-appearing.   HENT:      Head: Normocephalic and atraumatic.      Right Ear: External ear normal.      Left Ear: External ear normal.   Eyes:      General: No scleral icterus.     Pupils: Pupils are equal, round, and reactive to light.   Cardiovascular:      Rate and Rhythm: Normal rate. Rhythm irregular.      Pulses: Normal pulses.      Heart sounds: Normal heart sounds. No murmur heard.     No friction rub. No gallop.   Pulmonary:      Effort: Pulmonary effort is normal. No respiratory distress.      Breath sounds: No stridor. No wheezing, rhonchi or rales.   Abdominal:      General: Bowel sounds are normal. There is no distension.      Palpations: Abdomen is soft.      Tenderness: There is abdominal tenderness (LLQ to deep palpation - imprving). There is no guarding.   Musculoskeletal:         General: Normal range of motion.      Right lower leg: No edema.      Left lower leg: No edema.   Skin:     General: Skin is warm and dry.      Coloration: Skin is not jaundiced.      Findings: No bruising, erythema or rash.      Comments: Dry skin to back with scratch marks but no rash or lesions   Neurological:      Mental Status: He is alert.      Comments: AAO to person, place, time, and situation; CN II-XII grossly intact   Psychiatric:         Mood and Affect: Mood normal.         Behavior: Behavior normal.           Lines/Drains/Airways       Peripherally Inserted Central Catheter Line  Duration           "   PICC Triple Lumen 04/24/25 0942 right brachial 5 days              Peripheral Intravenous Line  Duration                  Peripheral IV - Single Lumen 04/22/25 0000 20 G Left;Posterior Forearm 8 days                    Significant Labs:    Lab Results   Component Value Date    WBC 6.62 04/30/2025    HGB 8.9 (L) 04/30/2025    HCT 27.8 (L) 04/30/2025    MCV 76.2 (L) 04/30/2025     04/30/2025           BMP  Lab Results   Component Value Date     04/30/2025    K 3.8 04/30/2025    CO2 28 04/30/2025    BUN 17.8 04/30/2025    CREATININE 0.97 04/30/2025    CALCIUM 9.9 04/30/2025    AGAP 12.0 04/30/2025    EGFRNONAA 88 (L) 12/06/2021         ABG  No results for input(s): "PH", "PO2", "PCO2", "HCO3", "POCBASEDEF" in the last 168 hours.      Mechanical Ventilation Support:  Oxygen Concentration (%): 28 (04/22/25 0739)      Significant Imaging:  I have reviewed the pertinent imaging within the past 24 hours.        Assessment/Plan:     Assessment  Septic shock likely from ESBL E Coli UTI  Remains on vasopressor; cortisol test normal but intermediate  Cardiogenic shock - improved  Acute on chronic CHF exacerbation  Chronic persistent atrial fibrillation  Nonobstructive coronary artery disease  Nonischemic cardiomyopathy  Heart failure with with reduced ejection fraction (EF 30%); NYHA Class III Stage C  Hx of VT arrest  Pulmonary hypertension, likely combined group II and III  PVD  ELIZABETH  COPD - 2 L supplemental oxygen dependent at home  On room air and doing well  Tobacco dependence  Microcytic anemia  Dyselectrolytemia   Prolonged QTC        Plan  Continue ongoing care in the ICU  Continue with Levophed and wean as tolerated for MAP goal of 65  Strict I&O and daily weight  Will repeat CXR and start lasix 20 mg BID   Hold off on GDMT  Blood culture x 2 - NGTD  Continue with IV Zosyn   Continue with Xarelto 20 mg daily  Continue DAPT and Statin  Will replete electrolytes, Keep K >4, Phos 3 and Mg >2   Will continue " with Vistaril prn and hydrocortisone cream. Avoid Benadryl given prolonged QTC.  Continue with Midodrine 10 mg TID  F/U Shaheed simulation test today, f/u BNP    DVT Prophylaxis: Xarelto  GI Prophylaxis: PPI     32 minutes of critical care was time spent personally by me on the following activities: development of treatment plan with patient or surrogate and bedside caregivers, discussions with consultants, evaluation of patient's response to treatment, examination of patient, ordering and performing treatments and interventions, ordering and review of laboratory studies, ordering and review of radiographic studies, pulse oximetry, re-evaluation of patient's condition.  This critical care time did not overlap with that of any other provider or involve time for any procedures.     Larry Mendez MD  Pulmonary Critical Care Medicine PGY-1  [facility] - ICU  DOS: 04/30/2025          [1]   Social History  Socioeconomic History    Marital status: Single   Tobacco Use    Smoking status: Every Day     Current packs/day: 0.50     Average packs/day: 0.8 packs/day for 50.3 years (40.9 ttl pk-yrs)     Types: Cigarettes     Start date: 1975     Passive exposure: Current    Smokeless tobacco: Never    Tobacco comments:     [facility] smoke 2-3 cigarettes per day   Substance and Sexual Activity    Alcohol use: Yes     Alcohol/week: 3.0 standard drinks of alcohol     Types: 3 Cans of beer per week     Comment: socially    Drug use: Not Currently     Frequency: 1.0 times per week     Types: Marijuana     Comment: no use in over 1 year    Sexual activity: Not Currently     Partners: Female     Social Drivers of Health     Financial Resource Strain: Low Risk  (4/23/2025)    Overall Financial Resource Strain (CARDIA)     Difficulty of Paying Living Expenses: Not hard at all   Food Insecurity: No Food Insecurity (4/23/2025)    Hunger Vital Sign     Worried About Running Out of Food in the Last Year: Never true     Ran Out of  Food in the Last Year: Never true   Transportation Needs: No Transportation Needs (4/23/2025)    PRAPARE - Transportation     Lack of Transportation (Medical): No     Lack of Transportation (Non-Medical): No   Physical Activity: Inactive (12/17/2024)    Exercise Vital Sign     Days of Exercise per Week: 0 days     Minutes of Exercise per Session: 0 min   Stress: No Stress Concern Present (4/23/2025)    [employer] [place] [employer] - Occupational Stress Questionnaire     Feeling of Stress : Not at all   Housing Stability: Low Risk  (4/23/2025)    Housing Stability Vital Sign     Unable to Pay for Housing in the Last Year: No     Homeless in the Last Year: No

## 2025-05-01 ENCOUNTER — TELEPHONE (OUTPATIENT)
Dept: CARDIOLOGY | Facility: CLINIC | Age: 70
End: 2025-05-01
Payer: MEDICARE

## 2025-05-01 LAB
ALBUMIN SERPL-MCNC: 3.3 G/DL (ref 3.4–4.8)
ALBUMIN/GLOB SERPL: 0.8 RATIO (ref 1.1–2)
ALP SERPL-CCNC: 177 UNIT/L (ref 40–150)
ALT SERPL-CCNC: 19 UNIT/L (ref 0–55)
ANION GAP SERPL CALC-SCNC: 14 MEQ/L
APICAL FOUR CHAMBER EJECTION FRACTION: 21 %
APICAL TWO CHAMBER EJECTION FRACTION: 21 %
AST SERPL-CCNC: 35 UNIT/L (ref 11–45)
AV INDEX (PROSTH): 0.5
AV MEAN GRADIENT: 7 MMHG
AV PEAK GRADIENT: 10 MMHG
AV VALVE AREA BY VELOCITY RATIO: 1.6 CM²
AV VALVE AREA: 1.6 CM²
AV VELOCITY RATIO: 0.5
BASOPHILS # BLD AUTO: 0.04 X10(3)/MCL
BASOPHILS NFR BLD AUTO: 0.7 %
BILIRUB SERPL-MCNC: 4.1 MG/DL
BSA FOR ECHO PROCEDURE: 2.12 M2
BUN SERPL-MCNC: 19.2 MG/DL (ref 8.4–25.7)
CALCIUM SERPL-MCNC: 9.7 MG/DL (ref 8.8–10)
CHLORIDE SERPL-SCNC: 98 MMOL/L (ref 98–107)
CO2 SERPL-SCNC: 25 MMOL/L (ref 23–31)
CREAT SERPL-MCNC: 0.92 MG/DL (ref 0.72–1.25)
CREAT/UREA NIT SERPL: 21
CV ECHO LV RWT: 0.4 CM
DOP CALC AO PEAK VEL: 1.6 M/S
DOP CALC AO VTI: 24.1 CM
DOP CALC LVOT AREA: 3.1 CM2
DOP CALC LVOT DIAMETER: 2 CM
DOP CALC LVOT PEAK VEL: 0.8 M/S
DOP CALC LVOT STROKE VOLUME: 37.7 CM3
DOP CALC MV VTI: 23.1 CM
DOP CALCLVOT PEAK VEL VTI: 12 CM
E WAVE DECELERATION TIME: 186 MSEC
E/A RATIO: 3.87
E/E' RATIO: 17 M/S
ECHO LV POSTERIOR WALL: 1.3 CM (ref 0.6–1.1)
EOSINOPHIL # BLD AUTO: 0.15 X10(3)/MCL (ref 0–0.9)
EOSINOPHIL NFR BLD AUTO: 2.7 %
ERYTHROCYTE [DISTWIDTH] IN BLOOD BY AUTOMATED COUNT: 24.1 % (ref 11.5–17)
FRACTIONAL SHORTENING: 13.8 % (ref 28–44)
GFR SERPLBLD CREATININE-BSD FMLA CKD-EPI: >60 ML/MIN/1.73/M2
GLOBULIN SER-MCNC: 4.3 GM/DL (ref 2.4–3.5)
GLUCOSE SERPL-MCNC: 108 MG/DL (ref 82–115)
HCT VFR BLD AUTO: 26.6 % (ref 42–52)
HGB BLD-MCNC: 8.5 G/DL (ref 14–18)
HR MV ECHO: 84 BPM
IMM GRANULOCYTES # BLD AUTO: 0.01 X10(3)/MCL (ref 0–0.04)
IMM GRANULOCYTES NFR BLD AUTO: 0.2 %
INTERVENTRICULAR SEPTUM: 1.4 CM (ref 0.6–1.1)
LEFT ATRIUM SIZE: 6.1 CM
LEFT ATRIUM VOLUME INDEX MOD: 92 ML/M2
LEFT ATRIUM VOLUME MOD: 194 ML
LEFT INTERNAL DIMENSION IN SYSTOLE: 5.6 CM (ref 2.1–4)
LEFT VENTRICLE DIASTOLIC VOLUME INDEX: 101.9 ML/M2
LEFT VENTRICLE DIASTOLIC VOLUME: 215 ML
LEFT VENTRICLE END DIASTOLIC VOLUME APICAL 2 CHAMBER: 221.62 ML
LEFT VENTRICLE END DIASTOLIC VOLUME APICAL 4 CHAMBER: 174.71 ML
LEFT VENTRICLE MASS INDEX: 199 G/M2
LEFT VENTRICLE SYSTOLIC VOLUME INDEX: 73 ML/M2
LEFT VENTRICLE SYSTOLIC VOLUME: 154 ML
LEFT VENTRICULAR INTERNAL DIMENSION IN DIASTOLE: 6.5 CM (ref 3.5–6)
LEFT VENTRICULAR MASS: 420 G
LV LATERAL E/E' RATIO: 12.9 M/S
LV SEPTAL E/E' RATIO: 23.2 M/S
LVED V (TEICH): 214.57 ML
LVES V (TEICH): 154.06 ML
LVOT MG: 1.04 MMHG
LVOT MV: 0.45 CM/S
LYMPHOCYTES # BLD AUTO: 1.2 X10(3)/MCL (ref 0.6–4.6)
LYMPHOCYTES NFR BLD AUTO: 21.8 %
MAGNESIUM SERPL-MCNC: 1.8 MG/DL (ref 1.6–2.6)
MCH RBC QN AUTO: 24 PG (ref 27–31)
MCHC RBC AUTO-ENTMCNC: 32 G/DL (ref 33–36)
MCV RBC AUTO: 75.1 FL (ref 80–94)
MONOCYTES # BLD AUTO: 0.72 X10(3)/MCL (ref 0.1–1.3)
MONOCYTES NFR BLD AUTO: 13.1 %
MV MEAN GRADIENT: 4 MMHG
MV PEAK A VEL: 0.3 M/S
MV PEAK E VEL: 1.16 M/S
MV PEAK GRADIENT: 9 MMHG
MV STENOSIS PRESSURE HALF TIME: 53.84 MS
MV VALVE AREA BY CONTINUITY EQUATION: 1.63 CM2
MV VALVE AREA P 1/2 METHOD: 4.09 CM2
NEUTROPHILS # BLD AUTO: 3.38 X10(3)/MCL (ref 2.1–9.2)
NEUTROPHILS NFR BLD AUTO: 61.5 %
NRBC BLD AUTO-RTO: 0 %
OHS CV RV/LV RATIO: 0.65 CM
OHS LV EJECTION FRACTION SIMPSONS BIPLANE MOD: 22 %
PHOSPHATE SERPL-MCNC: 2.9 MG/DL (ref 2.3–4.7)
PISA TR MAX VEL: 3.3 M/S
PLATELET # BLD AUTO: 147 X10(3)/MCL (ref 130–400)
PLATELETS.RETICULATED NFR BLD AUTO: 3.7 % (ref 0.9–11.2)
PMV BLD AUTO: ABNORMAL FL
POCT GLUCOSE: 136 MG/DL (ref 70–110)
POTASSIUM SERPL-SCNC: 3.1 MMOL/L (ref 3.5–5.1)
PROT SERPL-MCNC: 7.6 GM/DL (ref 5.8–7.6)
RA PRESSURE ESTIMATED: 15 MMHG
RBC # BLD AUTO: 3.54 X10(6)/MCL (ref 4.7–6.1)
RIGHT VENTRICLE DIASTOLIC BASEL DIMENSION: 4.2 CM
RIGHT VENTRICULAR END-DIASTOLIC DIMENSION: 4.18 CM
RV TB RVSP: 18 MMHG
SODIUM SERPL-SCNC: 137 MMOL/L (ref 136–145)
TDI LATERAL: 0.09 M/S
TDI SEPTAL: 0.05 M/S
TDI: 0.07 M/S
TR MAX PG: 44 MMHG
TRICUSPID ANNULAR PLANE SYSTOLIC EXCURSION: 1.3 CM
TV REST PULMONARY ARTERY PRESSURE: 59 MMHG
WBC # BLD AUTO: 5.5 X10(3)/MCL (ref 4.5–11.5)
Z-SCORE OF LEFT VENTRICULAR DIMENSION IN END DIASTOLE: -0.17
Z-SCORE OF LEFT VENTRICULAR DIMENSION IN END SYSTOLE: 2.59

## 2025-05-01 PROCEDURE — 85025 COMPLETE CBC W/AUTO DIFF WBC: CPT

## 2025-05-01 PROCEDURE — 84100 ASSAY OF PHOSPHORUS: CPT

## 2025-05-01 PROCEDURE — C1751 CATH, INF, PER/CENT/MIDLINE: HCPCS

## 2025-05-01 PROCEDURE — 97116 GAIT TRAINING THERAPY: CPT

## 2025-05-01 PROCEDURE — 83735 ASSAY OF MAGNESIUM: CPT

## 2025-05-01 PROCEDURE — 94640 AIRWAY INHALATION TREATMENT: CPT

## 2025-05-01 PROCEDURE — 27000221 HC OXYGEN, UP TO 24 HOURS

## 2025-05-01 PROCEDURE — 25000242 PHARM REV CODE 250 ALT 637 W/ HCPCS

## 2025-05-01 PROCEDURE — 80053 COMPREHEN METABOLIC PANEL: CPT

## 2025-05-01 PROCEDURE — 25000003 PHARM REV CODE 250

## 2025-05-01 PROCEDURE — 63600175 PHARM REV CODE 636 W HCPCS

## 2025-05-01 PROCEDURE — 27000207 HC ISOLATION

## 2025-05-01 PROCEDURE — 21400001 HC TELEMETRY ROOM

## 2025-05-01 PROCEDURE — 36415 COLL VENOUS BLD VENIPUNCTURE: CPT

## 2025-05-01 PROCEDURE — 82024 ASSAY OF ACTH: CPT

## 2025-05-01 RX ORDER — IPRATROPIUM BROMIDE AND ALBUTEROL SULFATE 2.5; .5 MG/3ML; MG/3ML
3 SOLUTION RESPIRATORY (INHALATION) EVERY 6 HOURS PRN
Status: DISCONTINUED | OUTPATIENT
Start: 2025-05-01 | End: 2025-05-06

## 2025-05-01 RX ORDER — SODIUM,POTASSIUM PHOSPHATES 280-250MG
1 POWDER IN PACKET (EA) ORAL ONCE
Status: COMPLETED | OUTPATIENT
Start: 2025-05-01 | End: 2025-05-01

## 2025-05-01 RX ORDER — MAGNESIUM SULFATE HEPTAHYDRATE 40 MG/ML
2 INJECTION, SOLUTION INTRAVENOUS ONCE
Status: COMPLETED | OUTPATIENT
Start: 2025-05-01 | End: 2025-05-01

## 2025-05-01 RX ORDER — POTASSIUM CHLORIDE 20 MEQ/1
60 TABLET, EXTENDED RELEASE ORAL ONCE
Status: COMPLETED | OUTPATIENT
Start: 2025-05-01 | End: 2025-05-01

## 2025-05-01 RX ADMIN — ACETAMINOPHEN 650 MG: 325 TABLET, FILM COATED ORAL at 09:05

## 2025-05-01 RX ADMIN — HYDROXYZINE PAMOATE 25 MG: 25 CAPSULE ORAL at 03:05

## 2025-05-01 RX ADMIN — PIPERACILLIN AND TAZOBACTAM 4.5 G: 4; .5 INJECTION, POWDER, LYOPHILIZED, FOR SOLUTION INTRAVENOUS; PARENTERAL at 06:05

## 2025-05-01 RX ADMIN — PIPERACILLIN AND TAZOBACTAM 4.5 G: 4; .5 INJECTION, POWDER, LYOPHILIZED, FOR SOLUTION INTRAVENOUS; PARENTERAL at 01:05

## 2025-05-01 RX ADMIN — RIVAROXABAN 20 MG: 10 TABLET, FILM COATED ORAL at 07:05

## 2025-05-01 RX ADMIN — CLOPIDOGREL BISULFATE 75 MG: 75 TABLET, FILM COATED ORAL at 08:05

## 2025-05-01 RX ADMIN — FOLIC ACID 1 MG: 1 TABLET ORAL at 08:05

## 2025-05-01 RX ADMIN — PANTOPRAZOLE SODIUM 40 MG: 40 TABLET, DELAYED RELEASE ORAL at 08:05

## 2025-05-01 RX ADMIN — MIDODRINE HYDROCHLORIDE 10 MG: 5 TABLET ORAL at 05:05

## 2025-05-01 RX ADMIN — MIDODRINE HYDROCHLORIDE 10 MG: 5 TABLET ORAL at 12:05

## 2025-05-01 RX ADMIN — Medication 1000 UNITS: at 08:05

## 2025-05-01 RX ADMIN — ASPIRIN 81 MG: 81 TABLET, COATED ORAL at 08:05

## 2025-05-01 RX ADMIN — POTASSIUM & SODIUM PHOSPHATES POWDER PACK 280-160-250 MG 1 PACKET: 280-160-250 PACK at 08:05

## 2025-05-01 RX ADMIN — IPRATROPIUM BROMIDE AND ALBUTEROL SULFATE 3 ML: .5; 3 SOLUTION RESPIRATORY (INHALATION) at 04:05

## 2025-05-01 RX ADMIN — POTASSIUM CHLORIDE 60 MEQ: 1500 TABLET, EXTENDED RELEASE ORAL at 08:05

## 2025-05-01 RX ADMIN — PIPERACILLIN AND TAZOBACTAM 4.5 G: 4; .5 INJECTION, POWDER, LYOPHILIZED, FOR SOLUTION INTRAVENOUS; PARENTERAL at 09:05

## 2025-05-01 RX ADMIN — MIDODRINE HYDROCHLORIDE 10 MG: 5 TABLET ORAL at 08:05

## 2025-05-01 RX ADMIN — MAGNESIUM SULFATE HEPTAHYDRATE 2 G: 40 INJECTION, SOLUTION INTRAVENOUS at 09:05

## 2025-05-01 RX ADMIN — ATORVASTATIN CALCIUM 80 MG: 40 TABLET, FILM COATED ORAL at 08:05

## 2025-05-01 NOTE — PLAN OF CARE
Problem: Skin Injury Risk Increased  Goal: Skin Health and Integrity  Outcome: Progressing     Problem: Adult Inpatient Plan of Care  Goal: Plan of Care Review  Outcome: Progressing  Goal: Patient-Specific Goal (Individualized)  Outcome: Progressing  Goal: Absence of Hospital-Acquired Illness or Injury  Outcome: Progressing  Goal: Optimal Comfort and Wellbeing  Outcome: Progressing  Goal: Readiness for Transition of Care  Outcome: Progressing     Problem: COPD (Chronic Obstructive Pulmonary Disease)  Goal: Optimal Chronic Illness Coping  Outcome: Progressing  Goal: Optimal Level of Functional [place]  Outcome: Progressing  Goal: Absence of Infection Signs and Symptoms  Outcome: Progressing  Goal: Improved Oral Intake  Outcome: Progressing     Problem: Heart Failure  Goal: Optimal Coping  Outcome: Progressing  Goal: Optimal Cardiac Output  Outcome: Progressing  Goal: Stable Heart Rate and Rhythm  Outcome: Progressing  Goal: Optimal Functional Ability  Outcome: Progressing  Goal: Fluid and Electrolyte Balance  Outcome: Progressing  Goal: Improved Oral Intake  Outcome: Progressing     Problem: Fall Injury Risk  Goal: Absence of Fall and Fall-Related Injury  Outcome: Progressing     Problem: Wound  Goal: Optimal Coping  Outcome: Progressing  Goal: Optimal Functional Ability  Outcome: Progressing  Goal: Absence of Infection Signs and Symptoms  Outcome: Progressing  Goal: Improved Oral Intake  Outcome: Progressing  Goal: Optimal Pain Control and Function  Outcome: Progressing  Goal: Skin Health and Integrity  Outcome: Progressing  Goal: Optimal Wound Healing  Outcome: Progressing     Problem: Acute Kidney Injury/Impairment  Goal: Fluid and Electrolyte Balance  Outcome: Progressing  Goal: Improved Oral Intake  Outcome: Progressing  Goal: Effective Renal Function  Outcome: Progressing     Problem: Dysrhythmia  Goal: Normalized Cardiac Rhythm  Outcome: Progressing     Problem: Infection  Goal: Absence of Infection  Signs and Symptoms  Outcome: Progressing

## 2025-05-01 NOTE — PT/OT/SLP PROGRESS
Physical Therapy Treatment    Patient Name:  Ran Reyes Jr.   MRN:  [id number]    Recommendations     Therapy Intensity Recommendations at Discharge: Low Intensity Therapy  Discharge Equipment Recommendations: shower chair, rollator, hospital bed (life vest)   Barriers to discharge: decreased endurance    Assessment     Ran Reyes Jr. is a 69 y.o. male admitted with a medical diagnosis of:  1. ELIZABETH (acute kidney injury)    2. Weakness    3. Dyspnea    4. Screening due    5. HFrEF (heart failure with reduced ejection fraction)    6. Acidemia    7. Microcytic anemia    8. Tobacco dependency    9. Acute on chronic combined systolic and diastolic congestive heart failure    10. Bradycardia    11. QT prolongation    12. Heart failure with reduced ejection fraction    13. Heart failure       Problem List[1]   He presents with the following impairments/functional limitations:  weakness, impaired endurance, impaired cardiopulmonary response to activity, impaired self care skills, impaired functional mobility, gait instability.    Rehab Prognosis: Good.    Patient would benefit from continued skilled acute PT services to: address above listed impairments/functional limitations; receive patient/caregiver education; reduce fall risk; and maximize independency/safety with functional mobility.    -continued: up-to-chair, ambulation, with progression of gait distance/frequency/duration and speed, as tolerated/appropriate, with assistance and supervision    Recent Surgery: * No surgery found *      Plan     During this hospitalization, patient to be seen 5 x/week to address the identified impairments/functional limitations via gait training, therapeutic exercises and progress toward the established goals.    Plan of Care Expires:  05/24/25    Subjective     Communicated with patient's nurse Menard prior to session.    Patient agreeable to participate in treatment session.    Chief Complaint: belly  discomfort  Patient/Family Comments/goals: home  Pain/Comfort:  Pain Rating 1: 0/10  Pain Addressed 1: Nurse notified  Pain Rating Post-Intervention 1: 0/10    Objective     Patient found sitting in chair with telemetry, blood pressure cuff, PICC line (life vest not on patient), upon PT entry to room.  * O2NC on standby on bed surface & no SAT O2 probe on patients fingers    General Precautions: Standard, fall, contact   Orthopedic Precautions:N/A   Braces: N/A  Respiratory Status: room air (O2NC on standby on bed surface)  SAT O2 Check: n/a    Functional Mobility:    Bed Mobility:  N/A - Patient seated in bedside chair at start of session and left seated in bedside chair at end of session    Transfers:  Sit to Stand: supervision with rolling walker  Stand to Sit: supervision with rolling walker  with no cues required    Gait:  Patient ambulated 150ft  Sitting rest x3 minutes  Patient ambulated 130ft  Sitting rest x2 minutes  Patient ambulated 110ftn to bedside commode (per patient request)  Patient ambulated 20ft  Sitting rest x2 minutes  Patient ambulated 110ft  with rollator and supervision.  Patient demonstrate:       steady gait       symmetrical step length       no loss of balance       no mis-steps       decreased susie       decreased bilateral step length       flexed posture  *standing pause (forward lean onto forearms on rollator) x1 each gait session ~15 seconds each    Other Mobility:  N/A    Balance:  Sit  Patient demonstrated static balance on level surface with independence with no verbal cues.  Patient demonstrated dynamic balance on level surface with modified independence with no verbal cues during moderate excursions.  Stand  Patient demonstrated static balance on level surface  using rollator with modified independence with no verbal cues.    *patient offered hand  for hand hygiene prior to eating from lunch tray    Patient left sitting in chair with telemetry, blood pressure cuff,  PICC line (life vest not on patient), with all lines intact, call button in reach, tray table at bedside w/ lunch tray set-up, bedside commode at bedside, and patient's nurse notified.  * O2NC on standby on bed surface & no SAT O2 probe on patients fingers - patients nurse notified    DME Justifications:  No DME recommended requiring DME justifications    Education     Patient educated on and assisted with functional mobility as noted above.  Patient educated on PT Plan of Care  Patient was instructed to utilize staff assistance for mobility/transfers.  White board updated regarding patient's safest level of mobility with staff assistance.    Goals     Multidisciplinary Problems       Physical Therapy Goals       Problem: Physical Therapy    Goal Priority Disciplines Outcome Interventions   Physical Therapy Goal     PT, PT/OT Progressing    Description: REVIEWED 2025  Goals to be met by: DISCHARGE  Patient will increase functional independence with mobility by performin. Sit <=> stand w/ RW at [facility]. - ONGOING  2. Bed <=> chair w/ RW at [facility]. - ONGOING  3. Ambulate 130' w/ RW at [facility]. - ONGOING           Time Tracking     PT Received On: 25  PT Start Time: 1136     PT Stop Time: 1205  PT Total Time (min): 29 min     Billable Minutes: Gait Training 29  Non-Billable Minutes: N/A  2025       [1]   Patient Active Problem List  Diagnosis    Primary open angle glaucoma (POAG) of right eye, moderate stage    Combined forms of age-related cataract of left eye    Arteriosclerosis of coronary artery    Chronic atrial fibrillation    Dyslipidemia    Hypertension    Tobacco use    PVD (peripheral vascular disease)    VHD (valvular heart disease)    COVID-19    HFrEF (heart failure with reduced ejection fraction)    Nonrheumatic mitral valve regurgitation    Postoperative eye state    Scrotal hematoma    Chronic obstructive pulmonary disease, unspecified    Lesion of external ear    Low back pain    Other  thrombophilia    Secondary hyperaldosteronism    Positive colorectal cancer screening using Cologuard test    Acute heart failure    History of COPD    CHF (congestive heart failure)    Acute cystitis with hematuria    Tobacco dependency    Moderate malnutrition

## 2025-05-01 NOTE — PT/OT/SLP PROGRESS
Physical Therapy    Missed Treatment Session - patient unwilling to participate note - 0846 - 05/01/2025    Patient Name:  Ran Reyes Jr.   MRN:  [id number]      -patient standing in front of bedside chair w/ mesh brief in hand  -patient not seen at this time secondary to patient unwilling to participate  -per patient-I'll let the nurse know you can come  -patients nurse Sailaja notified of patients decision concerning therapy session  -CNA entering room to assist patient  -will follow-up as patient is appropriate/available/agreeable to participate and as therapists' schedule allows.

## 2025-05-01 NOTE — NURSING TRANSFER
Nursing Transfer Note      5/1/2025   1:54 PM    Nurse giving handoff:Sailaja BLAKELY  Nurse receiving handoff:Brittany    Reason patient is being transferred: condition stable    Transfer To: [id number]    Transfer via wheelchair    Transfer with [employer] batteries, personal clothing and shoes, rollator walker; wallet in patient's hand.     Transported by nursing staff    Telemetry: Atrial fibrillation  Order for Tele Monitor? Yes    Additional Lines: Oxygen    Medicines sent: yes    Any special needs or follow-up needed: Life[employer] *    Patient belongings transferred with patient: Yes    Chart send with patient: Yes    Notified: Jada notified; per patient request    Patient reassessed at: 1350  1  Upon arrival to floor: cardiac monitor applied, patient oriented to room, call bell in reach, and bed in lowest position

## 2025-05-01 NOTE — PT/OT/SLP PROGRESS
Physical Therapy    Missed Treatment Session - cancel note - 1604 - 05/01/2025    Patient Name:  Ran Reyes Jr.   MRN:  [id number]      -patient not seen at this time secondary to not available  -respiratory therapist Ezio entering room for treatment   -will follow-up as patient is appropriate/available/agreeable to participate and as therapists' schedule allows.

## 2025-05-01 NOTE — PROGRESS NOTES
Inpatient Nutrition Assessment    Admit Date: 4/21/2025   Total duration of encounter: 10 days   Patient Age: 69 y.o.    Nutrition Recommendation/Prescription     Continue heart healthy/ 1.5 L fluid restriction; pt requesting extra gravy on food  continue boost plus -1 carton daily; Boost Plus (provides 360 kcal, 14 g protein per serving)   Electrolyte replacement as needed   MVI/fe  Biweekly wt  Pt education on diet complete  Will monitor nutrition status     Communication of Recommendations: reviewed with nurse and reviewed with patient    Nutrition Assessment     Malnutrition Assessment/Nutrition-Focused Physical Exam    Malnutrition Context: chronic illness (04/22/25 1459)  Malnutrition Level: moderate (04/22/25 1459)  Energy Intake (Malnutrition): less than 75% for greater than 7 days (04/22/25 1459)  Weight Loss (Malnutrition): other (see comments) (Does not meet criteria) (04/22/25 1459)  Subcutaneous Fat (Malnutrition): mild depletion (04/22/25 1459)  Orbital Region (Subcutaneous Fat Loss): mild depletion  Upper Arm Region (Subcutaneous Fat Loss): mild depletion     Muscle Mass (Malnutrition): mild depletion (04/22/25 1459)     Clavicle Bone Region (Muscle Loss): mild depletion         Fluid Accumulation (Malnutrition): mild (04/22/25 1459)     Hand  Strength, Right (Malnutrition): Unable to assess (04/22/25 1459)  A minimum of two characteristics is recommended for diagnosis of either severe or non-severe malnutrition.    Chart Review    Reason Seen: continuous nutrition monitoring and follow-up    Malnutrition Screening Tool Results   Have you recently lost weight without trying?: No  Have you been eating poorly because of a decreased appetite?: No   MST Score: 0   Diagnosis:  Afib, CAD, heart failure, pulmonary HTN, volume overload, hyponatremia, ELIZABETH, anion gap acidemia, abnormal UA, PVD, anemia, COPD, glaucoma ; cardiogenic shock , sepsis    Relevant Medical History: tobacco use, COPD, HTN, HLD,  Afib, heart failure, PVD, fourniers gangrene    Scheduled Medications:  aspirin, 81 mg, Daily  atorvastatin, 80 mg, Daily  clopidogreL, 75 mg, Daily  folic acid, 1 mg, Daily  midodrine, 10 mg, TID WM  pantoprazole, 40 mg, Daily  piperacillin-tazobactam (Zosyn) IV (PEDS and ADULTS) (extended infusion is not appropriate), 4.5 g, Q8H  rivaroxaban, 20 mg, Daily  vitamin D, 1,000 Units, Daily    Continuous Infusions:  NORepinephrine bitartrate-D5W, Last Rate: Stopped (04/30/25 1207)    PRN Medications:  acetaminophen, 650 mg, Q4H PRN  dextrose 50%, 12.5 g, PRN  dextrose 50%, 25 g, PRN  glucagon (human recombinant), 1 mg, PRN  glucose, 16 g, PRN  glucose, 24 g, PRN  hydrALAZINE, 10 mg, Q4H PRN  hydrocortisone, , TID PRN  hydrOXYzine pamoate, 25 mg, Q8H PRN  labetalol, 10 mg, Q4H PRN  LORazepam, 2 mg, Q4H PRN  melatonin, 6 mg, Nightly PRN  naloxone, 0.02 mg, PRN  nicotine, 1 patch, Daily PRN  polyethylene glycol, 17 g, Daily PRN  prochlorperazine, 5 mg, Q6H PRN  senna-docusate, 1 tablet, BID PRN  simethicone, 1 tablet, TID PRN  sodium chloride 0.9%, 10 mL, PRN    Calorie Containing IV Medications: no significant kcals from medications at this time    Recent Labs   Lab 04/25/25  0316 04/25/25  1003 04/26/25  0400 04/27/25  0359 04/28/25  0313 04/28/25  0314 04/29/25  0309 04/30/25  0539 05/01/25  0411   * 135* 134* 136 136  --  136 138 137   K 2.9* 3.1* 3.6 3.2* 3.4*  --  3.9 3.8 3.1*   CALCIUM 8.9 9.3 8.9 9.3 9.3  --  9.4 9.9 9.7   PHOS 2.8  --  2.3 3.1 3.3  --  2.9 3.0 2.9   MG 1.50*  --  1.80 1.66 2.00  --  2.00 1.70 1.80   CL 92* 93* 92* 94* 95*  --  97* 98 98   CO2 32* 31 32* 32* 28  --  28 28 25   BUN 13.0 12.7 15.8 14.4 15.5  --  19.0 17.8 19.2   CREATININE 0.76 0.91 0.85 0.85 0.79  --  0.89 0.97 0.92   EGFRNORACEVR >60 >60 >60 >60 >60  --  >60 >60 >60   BILITOT 5.2*  --  4.5* 4.2* 3.8*  --  3.4* 3.8* 4.1*   ALKPHOS 188*  --  182* 175* 177*  --  169* 184* 177*   ALT 21  --  21 20 23  --  20 20 19   AST 50*  --   49* 45 45  --  41 42 35   ALBUMIN 2.9*  --  2.9* 3.0* 3.1*  --  3.1* 3.3* 3.3*   WBC 4.25*  --  4.91 4.75  --  5.15 5.54 6.62 5.50   HGB 9.5*  --  9.2* 9.4*  --  9.2* 8.9* 8.9* 8.5*   HCT 28.4*  --  28.3* 28.9*  --  28.6* 27.7* 27.8* 26.6*     Nutrition Orders:  Diet Heart Healthy Fluid - 1500mL; Standard Tray  Dietary nutrition supplements Daily; Boost Plus Nutritional Drink - Rich Chocolate    Appetite/Oral Intake: fair/50-75% of meals  Factors Affecting Nutritional Intake: decreased appetite and shortness of breath  Social Needs Impacting Access to Food: none identified  Food/Rastafarian/Cultural Preferences: none reported  Food Allergies: none reported  Last Bowel Movement: 04/30/25  Wound(s):  none    Comments  (5/1) Pt reported he ate well for breakfast; depends what is on menu; appetite fair/good; drinking ONS; wt fluctuates with fluid; noted low K--on electrolyte repletion. Tbili remains elevated. Current diet tx appropriate.    (4/29) Pt sitting in chair during rounds ; eating peanut butter cookies; reported not liking food; wants gravy on meat; will honor request; ate eggs and sausage for breakfast; fair po intake; pt is drinking ONS. No new wt--in chair/unable to weigh. H/h(L)--consider fe : Tbili remains elevated. Pt remains on levophed.     (4/25) Pt tolerating oral diet; po intake remains fair; encouraged ONS; noted low K/Mg--suggest electrolyte repletion. Pt remains on levophed/pressor support. Tbili--elevated. Pt wt 196#; diuresis.     (4/22) Pt reported fair po intake; eating 50-75% meals; + LE edema; mild fat/muscle wasting; wt fluctuates with fluid; on diuretic; UBW between 205-225#. Pt willing to drink ONS; will order once daily --need to monitor fluid volume. Pt education complete on diet tx.     Anthropometrics    Height: 6' (182.9 cm), Height Method: Stated  Last Weight: 90.3 kg (199 lb 1.2 oz) (05/01/25 0600), Weight Method: Bed Scale  BMI (Calculated): 27  BMI Classification: overweight (BMI  25-29.9)        Ideal Body Weight (IBW), Male: 178 lb     % Ideal Body Weight, Male (lb): 109.37 %                 Usual Body Weight (UBW), k kg (wt fluctuates 205-225#; fluid)  % Usual Body Weight: 100     Usual Weight Provided By: patient and EMR weight history    Wt Readings from Last 5 Encounters:   25 90.3 kg (199 lb 1.2 oz)   25 102.1 kg (225 lb)   25 101.2 kg (223 lb)   25 102.5 kg (225 lb 14.4 oz)   02/15/25 103.6 kg (228 lb 6.3 oz)     Weight Change(s) Since Admission: -225#  Wt Readings from Last 1 Encounters:   25 0600 90.3 kg (199 lb 1.2 oz)   25 1030 88.3 kg (194 lb 10.7 oz)   25 0547 88.3 kg (194 lb 10.7 oz)   25 0600 89.1 kg (196 lb 6.9 oz)   25 0555 89.1 kg (196 lb 6.9 oz)   25 0502 87.8 kg (193 lb 9 oz)   25 0610 92.8 kg (204 lb 9.6 oz)   25 1709 96.6 kg (213 lb)   25 1137 97.5 kg (215 lb)   Admit Weight: 97.5 kg (215 lb) (25 1137), Weight Method: Stated    Estimated Needs    Weight Used For Calorie Calculations: 92.8 kg (204 lb 9.4 oz)  Energy Calorie Requirements (kcal): 2320 kcal/d; 25 tyron/kg  Energy Need Method: Kcal/kg  Weight Used For Protein Calculations: 92.8 kg (204 lb 9.4 oz)  Protein Requirements: 93 gm protein/d; 1 gm/kg  Fluid Requirements (mL): 2320 ml/d; 1ml/tyron        Enteral Nutrition     Patient not receiving enteral nutrition at this time.    Parenteral Nutrition     Patient not receiving parenteral nutrition support at this time.    Evaluation of Received Nutrient Intake    Calories: not meeting estimated needs; () po intake fair /good  Protein: not meeting estimated needs    Patient Education     Education Provided: heart healthy diet  Teaching Method: explanation and printed materials  Comprehension: verbalizes understanding  Barriers to Learning: none evident  Expected Compliance: fair  Comments: All questions were answered and dietitian's contact information was provided.      Nutrition Diagnosis     PES: Inadequate oral intake related to chronic illness as evidenced by eating 75% or less meals . (active)     PES: Moderate chronic disease or condition related malnutrition Related to chronic illness  As Evidenced by:  - energy intake: < 75% for 3 weeks (meets criteria for < 75% for > 7 days (moderate - acute)) - muscle mass depletion: 2 areas of mild muscle loss (Trapezius, Clavicle) - loss of subcutaneous fat: 3 areas of mild fat loss (Buccal, Triceps Skinfold, Infraorbital) - fluid accumulation: 1 area of mild fluid accumulation (Lower extremities edema) active    Nutrition Interventions     Intervention(s): modified composition of meals/snacks, multivitamin/mineral supplement therapy, purpose of nutrition education, and collaboration with other providers  Intervention(s): Oral nutritional supplement;Nutrition education;Care coordination or referral;Oral diet/nutrient modifications    Goal: Meet greater than 80% of nutritional needs by follow-up. (goal progressing)  Goal: Maintain weight throughout hospitalization. (goal progressing)    Nutrition Goals & Monitoring     Dietitian will monitor: food and beverage intake, weight, and food/nutrition knowledge skill  Discharge planning: continue heart healthy diet  Nutrition Risk/Follow-Up: patient at increased nutrition risk; dietitian will follow-up twice weekly   Please consult if re-assessment needed sooner.

## 2025-05-01 NOTE — PROGRESS NOTES
[facility] - ICU  Pulmonary Critical Care Note    Patient Name: Ran Reyes Jr.  MRN: [id number]  Admission Date: 4/21/2025  Hospital Length of Stay: 9 days  Code Status: Full Code  Attending Provider: Lennox Vinson MD  Primary Care Provider: Abiodun Recinos DO     Subjective:     HPI:   Ran Reyes Jr. is a 69 y.o.  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 Patient reports he can only ambulate about 20-30 feet before having to stop due to claudication pain which is chronic and unchanged compared to baseline. Since running out of his diuretic medications approximately 5 days ago he has noted progressively worsening lower extremity swelling causing leg heaviness and weakness exacerbating difficulty ambulating.  He also notes acute on chronic cough productive of a brownish sputum without hemoptysis.  Denies fever, chills, sweats.  Denies chest pain.  Endorses shortness of breath at rest and with ambulation but unchanged compared to baseline.  Denies abdominal pain, nausea, vomiting, constipation, diarrhea.  Denies increased or decreased urinary frequency, dysuria, or hematuria.  Sleeps with 2 pillows at night due to baseline orthopnea. Has not had to increase number of pillows or change sleeping habits. Wears 2L NC home oxygen at baseline for hx of COPD. Denies having increased oxygen requirements prior to ED presentation.     ED course:  Vital signs stable.  Oxygenation stable on 2 L  nasal cannula.  CBC shows microcytic anemic (HGB 9.0; MCV 77).  CMP shows hyponatremia (Na 128), acidemia (CO2 14), elevated renal indices (BUN 25; creatinine 1.93), elevated alkaline phosphatase (). Troponin WNL.  BNP 26 39.5.  UDS negative.  EKG showed atrial fibrillation with PVCs.  Chest x-ray difficult to interpret due to overlying medical devices however appears to show cardiomegaly with bilateral pulmonary vascular congestion with question of bilateral pleural effusions. He was admitted for acute CHF exacerbation and cardiorenal syndrome.     Hospital Course/Significant events:  4/23/25: Admit to ICU    24 Hour Interval History:  NAEON. Afebrile. Had mild episode of dyspnea with moving but resolved with 1L supplemental oxygen va NC. No acute complaints. Patient weaned off Levophed since noon yesterday and MAPs have remained > 65. Shaheed stim test noted with levels < 18 both 30 minutes and 60 minutes post cosyntropin administration. CBC stable. CMP with hypokalemia 3.1, Mag at 1.8 and Phos at 2.9. TTE repeated yesterday with some worsening of EF at 22% from 30% prior (12/2024).     Past Medical History:   Diagnosis Date    A-fib     Anticoagulant long-term use     Anxiety     Aortic aneurysm     Cataract     CHF (congestive heart failure)     Chronic atrial fibrillation     COPD (chronic obstructive pulmonary disease)     Coronary artery disease     Depression     HLD (hyperlipidemia)     Hypertension     Mitral regurgitation     PAD (peripheral artery disease)     Primary open angle glaucoma (POAG)        Past Surgical History:   Procedure Laterality Date    ANGIOGRAM, CORONARY, WITH LEFT HEART CATHETERIZATION N/A 03/26/2024    Procedure: Angiogram, Coronary, with Left Heart Cath;  Surgeon: Adriano Montiel MD;  Location: [facility] CATH LAB;  Service: Cardiology;  Laterality: N/A;    ATHERECTOMY OF PERIPHERAL VESSEL Left 09/12/2022    LEFT SFA ATHERECTOMY, BALLOON ANGIOPLASTY    CATARACT EXTRACTION W/   INTRAOCULAR LENS IMPLANT Left 03/09/2023    Procedure: EXTRACTION, CATARACT, WITH IOL INSERTION;  Surgeon: Georgi Borja MD;  Location: [facility];  Service: Ophthalmology;  Laterality: Left;  19.5  mac    EGD, WITH CLOSED BIOPSY  03/22/2024    Procedure: EGD, WITH CLOSED BIOPSY;  Surgeon: Ronald Calderon MD;  Location: [facility] ENDOSCOPY;  Service: Gastroenterology;;    ESOPHAGOGASTRODUODENOSCOPY N/A 03/22/2024    Procedure: EGD;  Surgeon: Ronald Calderon MD;  Location: [facility] ENDOSCOPY;  Service: Gastroenterology;  Laterality: N/A;    Heart Stent N/A     > 10yrs. AGO    INCISION AND DRAINAGE N/A 03/18/2024    Procedure: Incision and Drainage;  Surgeon: Fahad Rivas MD;  Location: [facility];  Service: Urology;  Laterality: N/A;  I&D SCROTAL ABSCESS    INSERTION OF STENT INTO PERIPHERAL VESSEL Right 10/17/2022    RIGHT SFA ATHERECTOMY, BALLOON ANGIOPLASTY, STENT; RIGHT ANTERIOR TIBIAL ARTERY ATHERECTOMY, BALLOON ANGIOPLASTY    ORCHIECTOMY Left 03/18/2024    Procedure: ORCHIECTOMY;  Surgeon: Fahad Rivas MD;  Location: [facility];  Service: Urology;  Laterality: Left;       Social History[1]        Current Outpatient Medications   Medication Instructions    acetaminophen 650 mg, 2 times daily PRN    ALBUTEROL INHL 2 puffs, Every 6 hours PRN    albuterol-ipratropium (DUO-NEB) 2.5 mg-0.5 mg/3 mL nebulizer solution 3 mLs, Nebulization, Every 6 hours PRN, Rescue    aspirin (ECOTRIN) 81 MG EC tablet 1 tablet, Daily    atorvastatin (LIPITOR) 80 mg, Oral, Daily    BREZTRI AEROSPHERE 160-9-4.8 mcg/actuation HFAA 2 puffs, 2 times daily    cetirizine (ZYRTEC) 10 mg, Oral, Daily    clopidogreL (PLAVIX) 75 mg, Oral, Daily    folic acid (FOLVITE) 1 mg, Oral, Daily    furosemide (LASIX) 40 mg, Oral, 2 times daily    gabapentin (NEURONTIN) 300 mg, Oral, 3 times daily    metoprolol succinate (TOPROL-XL) 12.5 mg, Oral, Daily    nicotine (NICODERM CQ) 14 mg/24 hr 1 patch, Transdermal, Daily PRN     nicotine (NICODERM CQ) 21 mg/24 hr 1 patch, Transdermal, Daily    nitrofurantoin, macrocrystal-monohydrate, (MACROBID) 100 MG capsule 100 mg, Oral, 2 times daily    olopatadine (PATANOL) 0.1 % ophthalmic solution Apply to eye.    pantoprazole (PROTONIX) 40 mg, Oral, 2 times daily    potassium chloride SA (K-DUR,KLOR-CON) 20 MEQ tablet 20 mEq, Oral, 2 times daily    rivaroxaban (XARELTO) 20 mg, Oral, Daily    sacubitriL-valsartan (ENTRESTO) 49-51 mg per tablet 1 tablet, Daily    sertraline (ZOLOFT) 100 mg, Oral, Daily    urea (CARMOL) 40 % Crea Apply topically.    varenicline tartrate (CHANTIX) 0.5 mg    vitamin D (VITAMIN D3) 1,000 Units, Daily       Review of patient's allergies indicates:  No Known Allergies     Current Inpatient Medications   aspirin  81 mg Oral Daily    atorvastatin  80 mg Oral Daily    clopidogreL  75 mg Oral Daily    folic acid  1 mg Oral Daily    magnesium sulfate 2 g IVPB  2 g Intravenous Once    midodrine  10 mg Oral TID WM    pantoprazole  40 mg Oral Daily    piperacillin-tazobactam (Zosyn) IV (PEDS and ADULTS) (extended infusion is not appropriate)  4.5 g Intravenous Q8H    potassium chloride  60 mEq Oral Once    potassium, sodium phosphates  1 packet Oral Once    rivaroxaban  20 mg Oral Daily    vitamin D  1,000 Units Oral Daily       Current Intravenous Infusions   NORepinephrine bitartrate-D5W  0-3 mcg/kg/min Intravenous Continuous   Stopped at 04/30/25 1207         Review of Systems   Constitutional:  Negative for fever.   Eyes:  Negative for blurred vision.   Respiratory:  Negative for cough, shortness of breath and wheezing.    Cardiovascular:  Negative for chest pain, palpitations and leg swelling.   Gastrointestinal:  Negative for abdominal pain, constipation, diarrhea, nausea and vomiting.   Genitourinary:  Negative for dysuria, frequency and urgency.   Musculoskeletal:  Negative for back pain.   Skin:  Negative for itching.   Neurological:  Negative for dizziness, speech change,  focal weakness, weakness and headaches.          Objective:       Intake/Output Summary (Last 24 hours) at 5/1/2025 0758  Last data filed at 5/1/2025 0602  Gross per 24 hour   Intake 1141.63 ml   Output 250 ml   Net 891.63 ml         Vital Signs (Most Recent):  Temp: 98.1 °F (36.7 °C) (05/01/25 0715)  Pulse: 78 (05/01/25 0700)  Resp: (!) 22 (05/01/25 0700)  BP: 104/72 (05/01/25 0700)  SpO2: 98 % (05/01/25 0700)  Body mass index is 27 kg/m².  Weight: 90.3 kg (199 lb 1.2 oz) Vital Signs (24h Range):  Temp:  [97.8 °F (36.6 °C)-98.1 °F (36.7 °C)] 98.1 °F (36.7 °C)  Pulse:  [] 78  Resp:  [12-32] 22  SpO2:  [92 %-100 %] 98 %  BP: ()/(50-84) 104/72     Physical Exam  Vitals reviewed.   Constitutional:       General: He is not in acute distress.     Appearance: He is not ill-appearing.   HENT:      Head: Normocephalic and atraumatic.      Right Ear: External ear normal.      Left Ear: External ear normal.   Eyes:      General: No scleral icterus.     Pupils: Pupils are equal, round, and reactive to light.   Cardiovascular:      Rate and Rhythm: Normal rate. Rhythm irregular.      Pulses: Normal pulses.      Heart sounds: Normal heart sounds. No murmur heard.     No friction rub. No gallop.   Pulmonary:      Effort: Pulmonary effort is normal. No respiratory distress.      Breath sounds: No stridor. No wheezing, rhonchi or rales.   Abdominal:      General: Bowel sounds are normal. There is no distension.      Palpations: Abdomen is soft.      Tenderness: There is no abdominal tenderness. There is no guarding.   Musculoskeletal:         General: Normal range of motion.      Right lower leg: No edema.      Left lower leg: No edema.   Skin:     General: Skin is warm and dry.      Coloration: Skin is not jaundiced.      Findings: No bruising, erythema or rash.      Comments: Dry skin to back with scratch marks but no rash or lesions   Neurological:      Mental Status: He is alert.      Comments: AAO to person, place,  "time, and situation; CN II-XII grossly intact   Psychiatric:         Mood and Affect: Mood normal.         Behavior: Behavior normal.           Lines/Drains/Airways       Peripherally Inserted Central Catheter Line  Duration             PICC Triple Lumen 04/24/25 0942 right brachial 6 days              Drain  Duration             Male External Urinary Catheter 04/30/25 2130 <1 day              Peripheral Intravenous Line  Duration                  Peripheral IV - Single Lumen 04/22/25 0000 20 G Left;Posterior Forearm 9 days                    Significant Labs:    Lab Results   Component Value Date    WBC 5.50 05/01/2025    HGB 8.5 (L) 05/01/2025    HCT 26.6 (L) 05/01/2025    MCV 75.1 (L) 05/01/2025     05/01/2025           BMP  Lab Results   Component Value Date     05/01/2025    K 3.1 (L) 05/01/2025    CO2 25 05/01/2025    BUN 19.2 05/01/2025    CREATININE 0.92 05/01/2025    CALCIUM 9.7 05/01/2025    AGAP 14.0 05/01/2025    EGFRNONAA 88 (L) 12/06/2021         ABG  No results for input(s): "PH", "PO2", "PCO2", "HCO3", "POCBASEDEF" in the last 168 hours.      Mechanical Ventilation Support:  Oxygen Concentration (%): 28 (04/22/25 0739)      Significant Imaging:  I have reviewed the pertinent imaging within the past 24 hours.        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  PVD  ELIZABETH  COPD  Tobacco dependence  Microcytic anemia  Dyselectrolytemia   Prolonged QTC  Adrenal insufficiency  Shaheed stim test with levels < 18        Plan  Continue ongoing care in the ICU  Off Levophed now. MAP sustained > 65  Strict I&O and daily weight  Hold off on " GDMT  Blood culture x 2 - NGTD  Will finish IV Zosyn 7/7 days today; will discontinue tomorrow  Continue with Xarelto 20 mg daily  Continue DAPT and Statin  Will replete electrolytes, Keep K >4, Phos 3 and Mg >2   Will continue with Vistaril prn and hydrocortisone cream. Avoid Benadryl given prolonged QTC.  Given ata stim test < 178 - will order ACTH level; will consider starting corticosteroids  Continue with Midodrine 10 mg TIDWM - will work on weaning off gradually  Downgrade to floor today.     DVT Prophylaxis: Xarelto  GI Prophylaxis: PPI     32 minutes of critical care was time spent personally by me on the following activities: development of treatment plan with patient or surrogate and bedside caregivers, discussions with consultants, evaluation of patient's response to treatment, examination of patient, ordering and performing treatments and interventions, ordering and review of laboratory studies, ordering and review of radiographic studies, pulse oximetry, re-evaluation of patient's condition.  This critical care time did not overlap with that of any other provider or involve time for any procedures.     Valerio Shrestha MD  Pulmonary Critical Care Medicine PGY-2  [facility] - ICU  DOS: 05/01/2025          [1]   Social History  Socioeconomic History    Marital status: Single   Tobacco Use    Smoking status: Every Day     Current packs/day: 0.50     Average packs/day: 0.8 packs/day for 50.3 years (40.9 ttl pk-yrs)     Types: Cigarettes     Start date: 1975     Passive exposure: Current    Smokeless tobacco: Never    Tobacco comments:     States smoke 2-3 cigarettes per day   Substance and Sexual Activity    Alcohol use: Yes     Alcohol/week: 3.0 standard drinks of alcohol     Types: 3 Cans of beer per week     Comment: socially    Drug use: Not Currently     Frequency: 1.0 times per week     Types: Marijuana     Comment: no use in over 1 year    Sexual activity: Not Currently     Partners: Female      Social Drivers of Health     Financial Resource Strain: Low Risk  (4/23/2025)    Overall Financial Resource Strain (CARDIA)     Difficulty of Paying Living Expenses: Not hard at all   Food Insecurity: No Food Insecurity (4/23/2025)    Hunger Vital Sign     Worried About Running Out of Food in the Last Year: Never true     Ran Out of Food in the Last Year: Never true   Transportation Needs: No Transportation Needs (4/23/2025)    PRAPARE - Transportation     Lack of Transportation (Medical): No     Lack of Transportation (Non-Medical): No   Physical Activity: Inactive (12/17/2024)    Exercise Vital Sign     Days of Exercise per Week: 0 days     Minutes of Exercise per Session: 0 min   Stress: No Stress Concern Present (4/23/2025)    [employer] - Occupational Stress Questionnaire     Feeling of Stress : Not at all   Housing Stability: Low Risk  (4/23/2025)    Housing Stability Vital Sign     Unable to Pay for Housing in the Last Year: No     Homeless in the Last Year: No

## 2025-05-01 NOTE — TELEPHONE ENCOUNTER
AICD procedure cancelled. Patient currently admitted to ICU. We will call patient to reschedule once his discharged.

## 2025-05-02 LAB
ACTH PLAS-MCNC: 53 PG/ML
ALBUMIN SERPL-MCNC: 3.4 G/DL (ref 3.4–4.8)
ALBUMIN/GLOB SERPL: 0.7 RATIO (ref 1.1–2)
ALP SERPL-CCNC: 186 UNIT/L (ref 40–150)
ALT SERPL-CCNC: 17 UNIT/L (ref 0–55)
ANION GAP SERPL CALC-SCNC: 19 MEQ/L
AST SERPL-CCNC: 36 UNIT/L (ref 11–45)
BASOPHILS # BLD AUTO: 0.05 X10(3)/MCL
BASOPHILS NFR BLD AUTO: 0.8 %
BILIRUB SERPL-MCNC: 4.9 MG/DL
BUN SERPL-MCNC: 24.1 MG/DL (ref 8.4–25.7)
CALCIUM SERPL-MCNC: 9.8 MG/DL (ref 8.8–10)
CHLORIDE SERPL-SCNC: 97 MMOL/L (ref 98–107)
CO2 SERPL-SCNC: 19 MMOL/L (ref 23–31)
CREAT SERPL-MCNC: 1.28 MG/DL (ref 0.72–1.25)
CREAT/UREA NIT SERPL: 19
EOSINOPHIL # BLD AUTO: 0.06 X10(3)/MCL (ref 0–0.9)
EOSINOPHIL NFR BLD AUTO: 0.9 %
ERYTHROCYTE [DISTWIDTH] IN BLOOD BY AUTOMATED COUNT: 24.3 % (ref 11.5–17)
GFR SERPLBLD CREATININE-BSD FMLA CKD-EPI: >60 ML/MIN/1.73/M2
GLOBULIN SER-MCNC: 4.6 GM/DL (ref 2.4–3.5)
GLUCOSE SERPL-MCNC: 112 MG/DL (ref 82–115)
HCT VFR BLD AUTO: 27.1 % (ref 42–52)
HGB BLD-MCNC: 8.7 G/DL (ref 14–18)
IMM GRANULOCYTES # BLD AUTO: 0.02 X10(3)/MCL (ref 0–0.04)
IMM GRANULOCYTES NFR BLD AUTO: 0.3 %
LYMPHOCYTES # BLD AUTO: 0.96 X10(3)/MCL (ref 0.6–4.6)
LYMPHOCYTES NFR BLD AUTO: 14.7 %
MAGNESIUM SERPL-MCNC: 2 MG/DL (ref 1.6–2.6)
MCH RBC QN AUTO: 24.3 PG (ref 27–31)
MCHC RBC AUTO-ENTMCNC: 32.1 G/DL (ref 33–36)
MCV RBC AUTO: 75.7 FL (ref 80–94)
MONOCYTES # BLD AUTO: 0.72 X10(3)/MCL (ref 0.1–1.3)
MONOCYTES NFR BLD AUTO: 11 %
NEUTROPHILS # BLD AUTO: 4.71 X10(3)/MCL (ref 2.1–9.2)
NEUTROPHILS NFR BLD AUTO: 72.3 %
NRBC BLD AUTO-RTO: 0 %
PHOSPHATE SERPL-MCNC: 3.2 MG/DL (ref 2.3–4.7)
PLATELET # BLD AUTO: 154 X10(3)/MCL (ref 130–400)
PLATELETS.RETICULATED NFR BLD AUTO: 5.1 % (ref 0.9–11.2)
PMV BLD AUTO: ABNORMAL FL
POTASSIUM SERPL-SCNC: 3.8 MMOL/L (ref 3.5–5.1)
PROT SERPL-MCNC: 8 GM/DL (ref 5.8–7.6)
RBC # BLD AUTO: 3.58 X10(6)/MCL (ref 4.7–6.1)
SODIUM SERPL-SCNC: 135 MMOL/L (ref 136–145)
WBC # BLD AUTO: 6.52 X10(3)/MCL (ref 4.5–11.5)

## 2025-05-02 PROCEDURE — 63600175 PHARM REV CODE 636 W HCPCS

## 2025-05-02 PROCEDURE — 25000003 PHARM REV CODE 250

## 2025-05-02 PROCEDURE — 80053 COMPREHEN METABOLIC PANEL: CPT

## 2025-05-02 PROCEDURE — 21400001 HC TELEMETRY ROOM

## 2025-05-02 PROCEDURE — 94761 N-INVAS EAR/PLS OXIMETRY MLT: CPT

## 2025-05-02 PROCEDURE — 27000221 HC OXYGEN, UP TO 24 HOURS

## 2025-05-02 PROCEDURE — 97116 GAIT TRAINING THERAPY: CPT

## 2025-05-02 PROCEDURE — 83735 ASSAY OF MAGNESIUM: CPT

## 2025-05-02 PROCEDURE — 99900035 HC TECH TIME PER 15 MIN (STAT)

## 2025-05-02 PROCEDURE — 84100 ASSAY OF PHOSPHORUS: CPT

## 2025-05-02 PROCEDURE — 85025 COMPLETE CBC W/AUTO DIFF WBC: CPT

## 2025-05-02 PROCEDURE — 94760 N-INVAS EAR/PLS OXIMETRY 1: CPT

## 2025-05-02 PROCEDURE — 27000207 HC ISOLATION

## 2025-05-02 PROCEDURE — 36415 COLL VENOUS BLD VENIPUNCTURE: CPT

## 2025-05-02 RX ORDER — MAGNESIUM SULFATE HEPTAHYDRATE 40 MG/ML
2 INJECTION, SOLUTION INTRAVENOUS ONCE
Status: DISCONTINUED | OUTPATIENT
Start: 2025-05-02 | End: 2025-05-02

## 2025-05-02 RX ORDER — PANTOPRAZOLE SODIUM 40 MG/1
40 TABLET, DELAYED RELEASE ORAL 2 TIMES DAILY
Status: DISCONTINUED | OUTPATIENT
Start: 2025-05-02 | End: 2025-05-14

## 2025-05-02 RX ORDER — FUROSEMIDE 10 MG/ML
20 INJECTION INTRAMUSCULAR; INTRAVENOUS ONCE
Status: COMPLETED | OUTPATIENT
Start: 2025-05-02 | End: 2025-05-02

## 2025-05-02 RX ORDER — METOPROLOL SUCCINATE 25 MG/1
25 TABLET, EXTENDED RELEASE ORAL DAILY
Status: DISCONTINUED | OUTPATIENT
Start: 2025-05-02 | End: 2025-05-03

## 2025-05-02 RX ORDER — FUROSEMIDE 10 MG/ML
20 INJECTION INTRAMUSCULAR; INTRAVENOUS ONCE
Status: DISCONTINUED | OUTPATIENT
Start: 2025-05-02 | End: 2025-05-02

## 2025-05-02 RX ORDER — POTASSIUM CHLORIDE 20 MEQ/1
40 TABLET, EXTENDED RELEASE ORAL ONCE
Status: DISCONTINUED | OUTPATIENT
Start: 2025-05-02 | End: 2025-05-02

## 2025-05-02 RX ORDER — HYDROXYZINE PAMOATE 25 MG/1
25 CAPSULE ORAL EVERY 6 HOURS PRN
Status: DISCONTINUED | OUTPATIENT
Start: 2025-05-02 | End: 2025-05-07

## 2025-05-02 RX ADMIN — MIDODRINE HYDROCHLORIDE 10 MG: 5 TABLET ORAL at 06:05

## 2025-05-02 RX ADMIN — MELATONIN TAB 3 MG 6 MG: 3 TAB at 08:05

## 2025-05-02 RX ADMIN — RIVAROXABAN 20 MG: 10 TABLET, FILM COATED ORAL at 06:05

## 2025-05-02 RX ADMIN — ALUMINUM HYDROXIDE AND MAGNESIUM HYDROXIDE 30 ML: 200; 200 SUSPENSION ORAL at 08:05

## 2025-05-02 RX ADMIN — SIMETHICONE 80 MG: 80 TABLET, CHEWABLE ORAL at 05:05

## 2025-05-02 RX ADMIN — PIPERACILLIN AND TAZOBACTAM 4.5 G: 4; .5 INJECTION, POWDER, LYOPHILIZED, FOR SOLUTION INTRAVENOUS; PARENTERAL at 08:05

## 2025-05-02 RX ADMIN — MIDODRINE HYDROCHLORIDE 10 MG: 5 TABLET ORAL at 08:05

## 2025-05-02 RX ADMIN — ASPIRIN 81 MG: 81 TABLET, COATED ORAL at 08:05

## 2025-05-02 RX ADMIN — CLOPIDOGREL BISULFATE 75 MG: 75 TABLET, FILM COATED ORAL at 08:05

## 2025-05-02 RX ADMIN — Medication 1000 UNITS: at 08:05

## 2025-05-02 RX ADMIN — PIPERACILLIN AND TAZOBACTAM 4.5 G: 4; .5 INJECTION, POWDER, LYOPHILIZED, FOR SOLUTION INTRAVENOUS; PARENTERAL at 02:05

## 2025-05-02 RX ADMIN — PANTOPRAZOLE SODIUM 40 MG: 40 TABLET, DELAYED RELEASE ORAL at 08:05

## 2025-05-02 RX ADMIN — SIMETHICONE 80 MG: 80 TABLET, CHEWABLE ORAL at 08:05

## 2025-05-02 RX ADMIN — METOPROLOL SUCCINATE 25 MG: 25 TABLET, EXTENDED RELEASE ORAL at 08:05

## 2025-05-02 RX ADMIN — HYDROXYZINE PAMOATE 25 MG: 25 CAPSULE ORAL at 03:05

## 2025-05-02 RX ADMIN — FUROSEMIDE 20 MG: 10 INJECTION, SOLUTION INTRAMUSCULAR; INTRAVENOUS at 12:05

## 2025-05-02 RX ADMIN — MIDODRINE HYDROCHLORIDE 10 MG: 5 TABLET ORAL at 12:05

## 2025-05-02 RX ADMIN — FOLIC ACID 1 MG: 1 TABLET ORAL at 08:05

## 2025-05-02 RX ADMIN — PROCHLORPERAZINE EDISYLATE 5 MG: 5 INJECTION INTRAMUSCULAR; INTRAVENOUS at 04:05

## 2025-05-02 RX ADMIN — HYDROCORTISONE SODIUM SUCCINATE 100 MG: 100 INJECTION, POWDER, FOR SOLUTION INTRAMUSCULAR; INTRAVENOUS at 12:05

## 2025-05-02 RX ADMIN — ATORVASTATIN CALCIUM 80 MG: 40 TABLET, FILM COATED ORAL at 08:05

## 2025-05-02 NOTE — PT/OT/SLP PROGRESS
Late Entry    Physical Therapy    Missed Treatment Session - cancel note - 1631 - 05/01/2025    Patient Name:  Ran Reyes Jr.   MRN:  [id number]      -patient not seen at this time secondary to patient not appropriate  -per patient-I'm having trouble breathing  -patients nurse Brittany in room w/ telemetry monitor lines  -will follow-up as patient is appropriate/available/agreeable to participate and as therapists' schedule allows

## 2025-05-02 NOTE — PLAN OF CARE
Problem: Heart Failure  Goal: Fluid and Electrolyte Balance  Outcome: Progressing  Goal: Improved Oral Intake  Outcome: Progressing     Problem: Fall Injury Risk  Goal: Absence of Fall and Fall-Related Injury  Outcome: Progressing     Problem: Wound  Goal: Optimal Coping  Outcome: Progressing  Goal: Optimal Functional Ability  Outcome: Progressing  Goal: Absence of Infection Signs and Symptoms  Outcome: Progressing  Goal: Improved Oral Intake  Outcome: Progressing  Goal: Optimal Pain Control and Function  Outcome: Progressing  Goal: Skin Health and Integrity  Outcome: Progressing  Goal: Optimal Wound Healing  Outcome: Progressing     Problem: Acute Kidney Injury/Impairment  Goal: Fluid and Electrolyte Balance  Outcome: Progressing  Goal: Improved Oral Intake  Outcome: Progressing  Goal: Effective Renal Function  Outcome: Progressing     Problem: Dysrhythmia  Goal: Normalized Cardiac Rhythm  Outcome: Progressing     Problem: Infection  Goal: Absence of Infection Signs and Symptoms  Outcome: Progressing

## 2025-05-02 NOTE — PROGRESS NOTES
[facility] Medicine Wards   Progress Note     Resident Team: [facility] Medicine List 3  Attending Physician: Lennox Vinson MD  Resident: Henrietta  Intern: Lico     Subjective:      Brief HPI:  Ran Reyes Jr. is a 69 y.o.  male with PMH of tobacco dependence, chronic obstructive pulmonary disease, hypertension, pulmonary hypertension, hyperlipidemia, chronic persistent atrial fibrillation on Xarelto, nonobstructive coronary artery disease, nonischemic cardiomyopathy, heart failure with with reduced ejection fraction (EF 30%), peripheral vascular disease s/p stenting to superficial femoral artery and right tibial artery, renée's gangrene (03/2024), primary open-angle glaucoma, who presented to [facility] ED (4/21/2025) due to generalized fatigue and weakness x 3-5 days. Patient reports he can only ambulate about 20-30 feet before having to stop due to claudication pain which is chronic and unchanged compared to baseline. Since running out of his diuretic medications approximately 5 days ago he has noted progressively worsening lower extremity swelling causing leg heaviness and weakness exacerbating difficulty ambulating.  He also notes acute on chronic cough productive of a brownish sputum without hemoptysis.  Denies fever, chills, sweats.  Denies chest pain.  Endorses shortness of breath at rest and with ambulation but unchanged compared to baseline.  Denies abdominal pain, nausea, vomiting, constipation, diarrhea.  Denies increased or decreased urinary frequency, dysuria, or hematuria.  Sleeps with 2 pillows at night due to baseline orthopnea. Has not had to increase number of pillows or change sleeping habits. Wears 2L [place] home oxygen at baseline for hx of COPD. Denies having increased oxygen requirements prior to ED presentation.     ED course:  Vital signs stable.  Oxygenation stable on 2 L nasal cannula.  CBC shows microcytic anemic (HGB 9.0; MCV 77).  CMP shows hyponatremia (Na 128), acidemia (CO2  14), elevated renal indices (BUN 25; creatinine 1.93), elevated alkaline phosphatase (). Troponin WNL.  BNP 26 39.5.  UDS negative.  EKG showed atrial fibrillation with PVCs.  Chest x-ray difficult to interpret due to overlying medical devices however appears to show cardiomegaly with bilateral pulmonary vascular congestion with question of bilateral pleural effusions. He was admitted for acute CHF exacerbation and cardiorenal syndrome.     Interval History:     Patient was downgraded yesterday. Patient with BP 90s/50s this AM, has become increasingly short of breath over past few days, with abdominal distension, nausea, and LE edema. Patient likely now re-entering exacerbation due to being off of GDMT and diuretics for several days. Shaheed stim test demonstrated likely adrenal insufficiency, still awaiting ACTH levels. Will get KUB, initiate diuresis, start beta blocker, and start steroids today. Off of zosyn now.      Review of Systems:  Constitutional:  Negative for fever.   Eyes:  Negative for blurred vision.   Respiratory:  Negative for cough, shortness of breath and wheezing.    Cardiovascular:  Negative for chest pain, palpitations and leg swelling.   Gastrointestinal:  Negative for abdominal pain, constipation, diarrhea, nausea and vomiting.   Genitourinary:  Negative for dysuria, frequency and urgency.   Musculoskeletal:  Negative for back pain.   Skin:  Negative for itching.   Neurological:  Negative for dizziness, speech change, focal weakness, weakness and headaches.        Objective:     Vital Signs (Most Recent):  Temp: 99 °F (37.2 °C) (05/02/25 0804)  Pulse: 94 (05/02/25 0837)  Resp: 18 (05/02/25 0804)  BP: 111/73 (05/02/25 0837)  SpO2: 99 % (05/02/25 0804) Vital Signs (24h Range):  Temp:  [97.7 °F (36.5 °C)-99 °F (37.2 °C)] 99 °F (37.2 °C)  Pulse:  [] 94  Resp:  [14-25] 18  SpO2:  [96 %-100 %] 99 %  BP: ()/(42-80) 111/73       Physical Examination:  Constitutional:       General: He  is not in acute distress.     Appearance: He is not ill-appearing.   HENT:      Head: Normocephalic and atraumatic.      Right Ear: External ear normal.      Left Ear: External ear normal.   Eyes:      General: No scleral icterus.     Pupils: Pupils are equal, round, and reactive to light.   Cardiovascular:      Rate and Rhythm: Normal rate. Rhythm irregular.      Pulses: Normal pulses.      Heart sounds: Normal heart sounds. No murmur heard.     No friction rub. No gallop. There is bilateral lower extremity edema 2+ pitting  Pulmonary:      Effort: Pulmonary effort is normal. No respiratory distress.      Breath sounds: No stridor. No wheezing, rhonchi; there are mild crackles  Abdominal:      General: Bowel sounds are normal. There is no distension.      Palpations: Abdomen is soft.      Tenderness:. There is no guarding. Abd distension and generalized tenderness  Musculoskeletal:         General: Normal range of motion.      Right lower leg: No edema.      Left lower leg: No edema.   Skin:     General: Skin is warm and dry.      Coloration: Skin is not jaundiced.      Findings: No bruising, erythema or rash.      Comments: Dry skin to back with scratch marks but no rash or lesions   Neurological:      Mental Status: He is alert.      Comments: AAO to person, place, time, and situation; CN II-XII grossly intact   Psychiatric:         Mood and Affect: Mood normal.         Behavior: Behavior normal.     Laboratory:  Trended Lab Data:  Recent Labs   Lab 04/30/25  0539 05/01/25  0411 05/02/25  0556   WBC 6.62 5.50 6.52   HGB 8.9* 8.5* 8.7*   HCT 27.8* 26.6* 27.1*    147 154   MCV 76.2* 75.1* 75.7*   RDW 24.4* 24.1* 24.3*    137 135*   K 3.8 3.1* 3.8   CL 98 98 97*   CO2 28 25 19*   BUN 17.8 19.2 24.1   CREATININE 0.97 0.92 1.28*    108 112   PROT 7.6 7.6 8.0*   ALBUMIN 3.3* 3.3* 3.4   BILITOT 3.8* 4.1* 4.9*   AST 42 35 36   ALKPHOS 184* 177* 186*   ALT 20 19 17       Microbiology Data Reviewed:    Pertinent Findings:      Other Results:  EKG (my interpretation): .    Radiology:   No new imaging.    Antibiotics and Day Number of Therapy:      Intake/Output Summary (Last 24 hours) at 5/2/2025 0973  Last data filed at 5/2/2025 0553  Gross per 24 hour   Intake 880.65 ml   Output 350 ml   Net 530.65 ml         Assessment & Plan:   Cardiogenic Shock (Improved)  HFrEF (22%, G3DD), Pulmonary Hypertension  NOCAD, NICMO  Chronic persistent Afib  Hx of VT arrest (has life vest)  Prolonged QTc  - patient not on any GDMT  - was downgraded from ICU yesterday after weaning pressors that were started due to cardiogenic shock  - patient with worsened LE edema, abdominal distension, nausea  - will get KUB  - needs adequate diuresis, but pressures remain low  - will start toprol 25 mg daily  - will assess in PM if can tolerate diuresis, trialing lasix IV 20 mg  - will re-consult cardiology    ELIZABETH  - patient with Cr 1.3, signs of volume overload  - hypervolemic on exam  - likely 2/2 congestion, will trial IV diuresis today    Septic shock 2/2 ESBL E.coli UTI  - Initially had UA with ESBL E. Coli with no symptoms; was SIRS+ and  had low pressures requiring pressors  - patient finished 7d course zosyn  - weaned off pressors  - remains on midodrine 10 mg TID  - pressure remains soft, titrating BP slowly  - CTM  - afebrile, WBC 6    Likely Adrenal Insufficiency  - patient had difficulty being weaned off pressors and had to be started on midodrine to bridge  - patient BP remains soft  - AM cortisol low normal, cosyntropin test indicative of adrenal insufficiency  - will await ACTH levels  - starting hydrocortisone 100 mg bolus today, followed by 50 mg q6h    COPD  - patient without hypoxia but does have some SOB  - has prn duonebs q6 prn scheduled    Anxiety  - prn vistaril   - Qtc prolonged so options limited, must avoid benadryl    Microcytic Anemia  - Hgb 8-9 avg  - at baseline, stable and chronic  - CTM  - no signs of overt  bleeding  - had previous EGD with chronic duodenitis and gastritis  - recs to continue protonix daily    CODE STATUS: Full Code  Access: Peripheral  Antibiotics: Zosyn, finished   Diet: Cardiac  DVT Prophylaxis: Xarelto 20 mg  GI Prophylaxis: none needed  Fluids: None    Disposition: Patient stepped down from ICU yesterday, initiating IV diuresis, initial GDMT, and steroids for adrenal insufficiency; weaning midodrine    Larry Barfield MD  [facility]  HO-1

## 2025-05-02 NOTE — PT/OT/SLP PROGRESS
Physical Therapy Treatment    Patient Name:  Ran Reyes Jr.   MRN:  [id number]    Recommendations     Therapy Intensity Recommendations at Discharge: Low Intensity Therapy  Discharge Equipment Recommendations: none   Barriers to discharge: fall risk and decreased endurance    Assessment     Ran Reyes Jr. is a 69 y.o. male admitted with a medical diagnosis of  <principal problem not specified>.  1. ELIZABETH (acute kidney injury)    2. Weakness    3. Dyspnea    4. Screening due    5. HFrEF (heart failure with reduced ejection fraction)    6. Acidemia    7. Microcytic anemia    8. Tobacco dependency    9. Acute on chronic combined systolic and diastolic congestive heart failure    10. Bradycardia    11. QT prolongation    12. Heart failure with reduced ejection fraction    13. Heart failure       Problem List[1]   He presents with the following impairments/functional limitations:  impaired endurance, impaired functional mobility, impaired cardiopulmonary response to activity.    Rehab Prognosis: TBD pending progress.    Patient would benefit from continued skilled acute PT services to: address above listed impairments/functional limitations; receive patient/caregiver education; reduce fall risk; and maximize independency/safety with functional mobility.    Recent Surgery: * No surgery found *      Plan     During this hospitalization, patient to be seen 5 x/week to address the identified impairments/functional limitations via gait training, therapeutic activities, therapeutic exercises, neuromuscular re-education and progress toward the established goals.    Plan of Care Expires:   (Discharge)    Subjective     Communicated with patient's nurse Brittany prior to session.    Patient agreeable to participate in treatment session.    Chief Complaint: Abdominal pain   Patient/Family Comments/goals: None stated   Pain/Comfort:  Pain Rating 1: 7/10  Location 1: abdomen  Pain Addressed 1: Nurse notified  Pain Rating  Post-Intervention 1: 7/10    Objective     Patient found with HOB elevated with PICC line, oxygen, telemetry (life vest)  upon PT entry to room.    General Precautions: Standard, fall   Orthopedic Precautions:N/A   Braces:  N/A  Respiratory Status: 1.5 liters/min O2 via nasal cannula. Pt's O2 off when PT entered room.      Functional Mobility:    Bed Mobility:  Supine to Sit: modified independence  Sit to Supine: modified independence    Transfers:  Sit to Stand: stand by assistance with rollator  Stand to Sit: stand by assistance with rollator    Gait:  Patient ambulated 80ft with rollator and contact guard assistance and minimum assistance. Pt taking rest break x 2 leaning on handles of rollator.    Patient demonstrates :       decreased susie       flexed posture      slowed, guarded, time consuming movements - all transitional activities.    Other Mobility:  N/A    Patient left with HOB elevated with all lines intact, call button in reach, tray table at bedside, and patient's nurse notified.    DME Justifications:  No DME recommended requiring DME justifications    Education     Patient educated on and assisted with functional mobility as noted above.  Patient educated on PT Plan of Care.  Patient was instructed to utilize staff assistance for mobility/transfers.  White board updated regarding patient's safest level of mobility with staff assistance    Goals     Multidisciplinary Problems       Physical Therapy Goals          Problem: Physical Therapy    Goal Priority Disciplines Outcome Interventions   Physical Therapy Goal     PT, PT/OT Progressing    Description: REVIEWED 2025  Goals to be met by: DISCHARGE  Patient will increase functional independence with mobility by performin. Sit <=> stand w/ RW at [facility]. - ONGOING  2. Bed <=> chair w/ RW at [facility]. - ONGOING  3. Ambulate 130' w/ RW at [facility]. - ONGOING                     Time Tracking     PT Received On: 25  PT Start Time: 1520     PT  Stop Time: 1540  PT Total Time (min): 20 min     Billable Minutes: Gait Training 15 mins     05/02/2025       [1]   Patient Active Problem List  Diagnosis    Primary open angle glaucoma (POAG) of right eye, moderate stage    Combined forms of age-related cataract of left eye    Arteriosclerosis of coronary artery    Chronic atrial fibrillation    Dyslipidemia    Hypertension    Tobacco use    PVD (peripheral vascular disease)    VHD (valvular heart disease)    COVID-19    HFrEF (heart failure with reduced ejection fraction)    Nonrheumatic mitral valve regurgitation    Postoperative eye state    Scrotal hematoma    Chronic obstructive pulmonary disease, unspecified    Lesion of external ear    Low back pain    Other thrombophilia    Secondary hyperaldosteronism    Positive colorectal cancer screening using Cologuard test    Acute heart failure    History of COPD    CHF (congestive heart failure)    Acute cystitis with hematuria    Tobacco dependency    Moderate malnutrition

## 2025-05-03 LAB
A-ADO2 BLOOD GAS (OHS): 119 MMHG
A-ADO2 BLOOD GAS (OHS): 128 MMHG
ALBUMIN SERPL-MCNC: 3.4 G/DL (ref 3.4–4.8)
ALBUMIN/GLOB SERPL: 0.7 RATIO (ref 1.1–2)
ALLENS TEST BLOOD GAS (OHS): ABNORMAL
ALLENS TEST BLOOD GAS (OHS): YES
ALLENS TEST BLOOD GAS (OHS): YES
ALP SERPL-CCNC: 213 UNIT/L (ref 40–150)
ALT SERPL-CCNC: 18 UNIT/L (ref 0–55)
ANION GAP SERPL CALC-SCNC: 16 MEQ/L
ANION GAP SERPL CALC-SCNC: 21 MEQ/L
ANION GAP SERPL CALC-SCNC: 23 MEQ/L
ANION GAP SERPL CALC-SCNC: 23 MEQ/L
AST SERPL-CCNC: 36 UNIT/L (ref 11–45)
BASE EXCESS BLD CALC-SCNC: -3.6 MMOL/L
BASE EXCESS BLD CALC-SCNC: -4.5 MMOL/L
BASE EXCESS BLD CALC-SCNC: -4.9 MMOL/L (ref -2–2)
BASOPHILS # BLD AUTO: 0.01 X10(3)/MCL
BASOPHILS NFR BLD AUTO: 0.1 %
BILIRUB SERPL-MCNC: 5.4 MG/DL
BLOOD GAS SAMPLE TYPE (OHS): ABNORMAL
BUN SERPL-MCNC: 36.3 MG/DL (ref 8.4–25.7)
BUN SERPL-MCNC: 42.5 MG/DL (ref 8.4–25.7)
BUN SERPL-MCNC: 44.9 MG/DL (ref 8.4–25.7)
BUN SERPL-MCNC: 45.4 MG/DL (ref 8.4–25.7)
CALCIUM SERPL-MCNC: 10 MG/DL (ref 8.8–10)
CALCIUM SERPL-MCNC: 10 MG/DL (ref 8.8–10)
CALCIUM SERPL-MCNC: 9.5 MG/DL (ref 8.8–10)
CALCIUM SERPL-MCNC: 9.9 MG/DL (ref 8.8–10)
CHLORIDE SERPL-SCNC: 93 MMOL/L (ref 98–107)
CHLORIDE SERPL-SCNC: 94 MMOL/L (ref 98–107)
CHLORIDE SERPL-SCNC: 94 MMOL/L (ref 98–107)
CHLORIDE SERPL-SCNC: 95 MMOL/L (ref 98–107)
CO2 BLDA-SCNC: 20.2 MMOL/L (ref 22–26)
CO2 BLDA-SCNC: 21.1 MMOL/L
CO2 BLDA-SCNC: 22.7 MMOL/L
CO2 SERPL-SCNC: 16 MMOL/L (ref 23–31)
CO2 SERPL-SCNC: 16 MMOL/L (ref 23–31)
CO2 SERPL-SCNC: 18 MMOL/L (ref 23–31)
CO2 SERPL-SCNC: 21 MMOL/L (ref 23–31)
COHGB MFR BLDA: 2 %
COHGB MFR BLDA: 2.7 % (ref 0.5–1.5)
COHGB MFR BLDA: 3.9 %
CREAT SERPL-MCNC: 1.91 MG/DL (ref 0.72–1.25)
CREAT SERPL-MCNC: 2 MG/DL (ref 0.72–1.25)
CREAT SERPL-MCNC: 2.08 MG/DL (ref 0.72–1.25)
CREAT SERPL-MCNC: 2.17 MG/DL (ref 0.72–1.25)
CREAT/UREA NIT SERPL: 19
CREAT/UREA NIT SERPL: 20
CREAT/UREA NIT SERPL: 21
CREAT/UREA NIT SERPL: 22
DRAWN BY BLOOD GAS (OHS): ABNORMAL
EOSINOPHIL # BLD AUTO: 0.01 X10(3)/MCL (ref 0–0.9)
EOSINOPHIL NFR BLD AUTO: 0.1 %
ERYTHROCYTE [DISTWIDTH] IN BLOOD BY AUTOMATED COUNT: 24.4 % (ref 11.5–17)
GAS PNL BLD: 157 MMHG
GAS PNL BLD: 161 MMHG
GFR SERPLBLD CREATININE-BSD FMLA CKD-EPI: 33 ML/MIN/1.73/M2
GFR SERPLBLD CREATININE-BSD FMLA CKD-EPI: 34 ML/MIN/1.73/M2
GFR SERPLBLD CREATININE-BSD FMLA CKD-EPI: 36 ML/MIN/1.73/M2
GFR SERPLBLD CREATININE-BSD FMLA CKD-EPI: 38 ML/MIN/1.73/M2
GLOBULIN SER-MCNC: 4.6 GM/DL (ref 2.4–3.5)
GLUCOSE SERPL-MCNC: 108 MG/DL (ref 82–115)
GLUCOSE SERPL-MCNC: 130 MG/DL (ref 82–115)
GLUCOSE SERPL-MCNC: 176 MG/DL (ref 82–115)
GLUCOSE SERPL-MCNC: 238 MG/DL (ref 82–115)
HCO3 BLDA-SCNC: 19.2 MMOL/L (ref 22–26)
HCO3 BLDA-SCNC: 20.1 MMOL/L
HCO3 BLDA-SCNC: 21.5 MMOL/L
HCT VFR BLD AUTO: 25.8 % (ref 42–52)
HGB BLD-MCNC: 8.2 G/DL (ref 14–18)
HOLD SPECIMEN: NORMAL
IMM GRANULOCYTES # BLD AUTO: 0.02 X10(3)/MCL (ref 0–0.04)
IMM GRANULOCYTES NFR BLD AUTO: 0.3 %
INHALED O2 CONCENTRATION: 28 %
INHALED O2 CONCENTRATION: 28 %
LACTATE SERPL-SCNC: 3.4 MMOL/L (ref 0.5–2.2)
LACTATE SERPL-SCNC: 4 MMOL/L (ref 0.5–2.2)
LACTATE SERPL-SCNC: 4.7 MMOL/L (ref 0.5–2.2)
LACTATE SERPL-SCNC: 7.6 MMOL/L (ref 0.5–2.2)
LACTATE SERPL-SCNC: 8 MMOL/L (ref 0.5–2.2)
LACTATE SERPL-SCNC: 8.7 MMOL/L (ref 0.5–2.2)
LACTATE SERPL-SCNC: 9.9 MMOL/L (ref 0.5–2.2)
LPM (OHS): 2
LPM (OHS): 2
LYMPHOCYTES # BLD AUTO: 0.86 X10(3)/MCL (ref 0.6–4.6)
LYMPHOCYTES NFR BLD AUTO: 11.6 %
MAGNESIUM SERPL-MCNC: 2.1 MG/DL (ref 1.6–2.6)
MCH RBC QN AUTO: 24.3 PG (ref 27–31)
MCHC RBC AUTO-ENTMCNC: 31.8 G/DL (ref 33–36)
MCV RBC AUTO: 76.3 FL (ref 80–94)
METHGB MFR BLDA: 0.7 %
METHGB MFR BLDA: 1 %
METHGB MFR BLDA: 1 % (ref 0–1.5)
MONOCYTES # BLD AUTO: 0.75 X10(3)/MCL (ref 0.1–1.3)
MONOCYTES NFR BLD AUTO: 10.1 %
NEUTROPHILS # BLD AUTO: 5.76 X10(3)/MCL (ref 2.1–9.2)
NEUTROPHILS NFR BLD AUTO: 77.8 %
NRBC BLD AUTO-RTO: 0 %
O2 HB BLOOD GAS (OHS): 42.7 %
O2 HB BLOOD GAS (OHS): 68.5 % (ref 94–100)
O2 HB BLOOD GAS (OHS): 72.6 %
OXYGEN DEVICE BLOOD GAS (OHS): ABNORMAL
OXYGEN DEVICE BLOOD GAS (OHS): ABNORMAL
OXYHGB MFR BLDA: 8.6 G/DL
OXYHGB MFR BLDA: 8.6 G/DL
OXYHGB MFR BLDA: 9.1 G/DL (ref 12–18)
PCO2 BLDA: 31 MMHG (ref 35–45)
PCO2 BLDA: 34 MMHG (ref 40–50)
PCO2 BLDA: 38 MMHG (ref 40–50)
PH BLDA: 7.36 [PH]
PH BLDA: 7.38 [PH]
PH BLDA: 7.4 [PH] (ref 7.35–7.45)
PHOSPHATE SERPL-MCNC: 4 MG/DL (ref 2.3–4.7)
PLATELET # BLD AUTO: 159 X10(3)/MCL (ref 130–400)
PLATELETS.RETICULATED NFR BLD AUTO: 4.9 % (ref 0.9–11.2)
PMV BLD AUTO: ABNORMAL FL
PO2 BLDA: 29 MMHG (ref 30–40)
PO2 BLDA: 42 MMHG (ref 75–100)
PO2 BLDA: 45 MMHG (ref 30–40)
POTASSIUM SERPL-SCNC: 4 MMOL/L (ref 3.5–5.1)
POTASSIUM SERPL-SCNC: 4.3 MMOL/L (ref 3.5–5.1)
POTASSIUM SERPL-SCNC: 4.4 MMOL/L (ref 3.5–5.1)
POTASSIUM SERPL-SCNC: 4.4 MMOL/L (ref 3.5–5.1)
PROT SERPL-MCNC: 8 GM/DL (ref 5.8–7.6)
RBC # BLD AUTO: 3.38 X10(6)/MCL (ref 4.7–6.1)
SAMPLE SITE BLOOD GAS (OHS): ABNORMAL
SAMPLE SITE BLOOD GAS (OHS): ABNORMAL
SAO2 % BLDA: 43.8 %
SAO2 % BLDA: 71.1 %
SAO2 % BLDA: 76.3 %
SODIUM SERPL-SCNC: 131 MMOL/L (ref 136–145)
SODIUM SERPL-SCNC: 132 MMOL/L (ref 136–145)
SODIUM SERPL-SCNC: 133 MMOL/L (ref 136–145)
SODIUM SERPL-SCNC: 134 MMOL/L (ref 136–145)
WBC # BLD AUTO: 7.41 X10(3)/MCL (ref 4.5–11.5)

## 2025-05-03 PROCEDURE — 25000003 PHARM REV CODE 250

## 2025-05-03 PROCEDURE — 80053 COMPREHEN METABOLIC PANEL: CPT

## 2025-05-03 PROCEDURE — 27000207 HC ISOLATION

## 2025-05-03 PROCEDURE — 20000000 HC ICU ROOM

## 2025-05-03 PROCEDURE — 94640 AIRWAY INHALATION TREATMENT: CPT

## 2025-05-03 PROCEDURE — 63600175 PHARM REV CODE 636 W HCPCS: Mod: JZ,TB

## 2025-05-03 PROCEDURE — 36415 COLL VENOUS BLD VENIPUNCTURE: CPT | Performed by: STUDENT IN AN ORGANIZED HEALTH CARE EDUCATION/TRAINING PROGRAM

## 2025-05-03 PROCEDURE — 83605 ASSAY OF LACTIC ACID: CPT

## 2025-05-03 PROCEDURE — 94761 N-INVAS EAR/PLS OXIMETRY MLT: CPT

## 2025-05-03 PROCEDURE — 25000242 PHARM REV CODE 250 ALT 637 W/ HCPCS

## 2025-05-03 PROCEDURE — 99900035 HC TECH TIME PER 15 MIN (STAT)

## 2025-05-03 PROCEDURE — 83735 ASSAY OF MAGNESIUM: CPT

## 2025-05-03 PROCEDURE — 63600175 PHARM REV CODE 636 W HCPCS

## 2025-05-03 PROCEDURE — 94799 UNLISTED PULMONARY SVC/PX: CPT

## 2025-05-03 PROCEDURE — 25000003 PHARM REV CODE 250: Performed by: STUDENT IN AN ORGANIZED HEALTH CARE EDUCATION/TRAINING PROGRAM

## 2025-05-03 PROCEDURE — 36600 WITHDRAWAL OF ARTERIAL BLOOD: CPT

## 2025-05-03 PROCEDURE — 27000221 HC OXYGEN, UP TO 24 HOURS

## 2025-05-03 PROCEDURE — 82803 BLOOD GASES ANY COMBINATION: CPT

## 2025-05-03 PROCEDURE — 84100 ASSAY OF PHOSPHORUS: CPT

## 2025-05-03 PROCEDURE — 85025 COMPLETE CBC W/AUTO DIFF WBC: CPT

## 2025-05-03 PROCEDURE — 93005 ELECTROCARDIOGRAM TRACING: CPT

## 2025-05-03 PROCEDURE — 36415 COLL VENOUS BLD VENIPUNCTURE: CPT

## 2025-05-03 RX ORDER — SODIUM CHLORIDE 9 MG/ML
INJECTION, SOLUTION INTRAVENOUS CONTINUOUS
Status: DISCONTINUED | OUTPATIENT
Start: 2025-05-03 | End: 2025-05-20 | Stop reason: HOSPADM

## 2025-05-03 RX ORDER — NOREPINEPHRINE BITARTRATE/D5W 4MG/250ML
0-3 PLASTIC BAG, INJECTION (ML) INTRAVENOUS CONTINUOUS
Status: DISCONTINUED | OUTPATIENT
Start: 2025-05-03 | End: 2025-05-03

## 2025-05-03 RX ORDER — DEXAMETHASONE SODIUM PHOSPHATE 4 MG/ML
4 INJECTION, SOLUTION INTRA-ARTICULAR; INTRALESIONAL; INTRAMUSCULAR; INTRAVENOUS; SOFT TISSUE ONCE
Status: COMPLETED | OUTPATIENT
Start: 2025-05-03 | End: 2025-05-03

## 2025-05-03 RX ORDER — MIDODRINE HYDROCHLORIDE 5 MG/1
5 TABLET ORAL
Status: DISCONTINUED | OUTPATIENT
Start: 2025-05-03 | End: 2025-05-05

## 2025-05-03 RX ORDER — BUMETANIDE 0.25 MG/ML
2 INJECTION, SOLUTION INTRAMUSCULAR; INTRAVENOUS ONCE
Status: COMPLETED | OUTPATIENT
Start: 2025-05-03 | End: 2025-05-03

## 2025-05-03 RX ORDER — METHOCARBAMOL 500 MG/1
500 TABLET, FILM COATED ORAL ONCE
Status: COMPLETED | OUTPATIENT
Start: 2025-05-04 | End: 2025-05-03

## 2025-05-03 RX ORDER — DOBUTAMINE HYDROCHLORIDE 400 MG/100ML
2.5 INJECTION INTRAVENOUS CONTINUOUS
Status: DISCONTINUED | OUTPATIENT
Start: 2025-05-03 | End: 2025-05-12

## 2025-05-03 RX ORDER — FUROSEMIDE 10 MG/ML
20 INJECTION INTRAMUSCULAR; INTRAVENOUS ONCE
Status: COMPLETED | OUTPATIENT
Start: 2025-05-03 | End: 2025-05-03

## 2025-05-03 RX ADMIN — DEXAMETHASONE SODIUM PHOSPHATE 4 MG: 4 INJECTION, SOLUTION INTRA-ARTICULAR; INTRALESIONAL; INTRAMUSCULAR; INTRAVENOUS; SOFT TISSUE at 05:05

## 2025-05-03 RX ADMIN — HYDROXYZINE PAMOATE 25 MG: 25 CAPSULE ORAL at 01:05

## 2025-05-03 RX ADMIN — TRAZODONE HYDROCHLORIDE 25 MG: 50 TABLET ORAL at 11:05

## 2025-05-03 RX ADMIN — METOPROLOL SUCCINATE 25 MG: 25 TABLET, EXTENDED RELEASE ORAL at 08:05

## 2025-05-03 RX ADMIN — CLOPIDOGREL BISULFATE 75 MG: 75 TABLET, FILM COATED ORAL at 08:05

## 2025-05-03 RX ADMIN — PANTOPRAZOLE SODIUM 40 MG: 40 TABLET, DELAYED RELEASE ORAL at 08:05

## 2025-05-03 RX ADMIN — HYDROCORTISONE SODIUM SUCCINATE 50 MG: 100 INJECTION, POWDER, FOR SOLUTION INTRAMUSCULAR; INTRAVENOUS at 11:05

## 2025-05-03 RX ADMIN — MIDODRINE HYDROCHLORIDE 5 MG: 5 TABLET ORAL at 05:05

## 2025-05-03 RX ADMIN — Medication 1000 UNITS: at 08:05

## 2025-05-03 RX ADMIN — ASPIRIN 81 MG: 81 TABLET, COATED ORAL at 08:05

## 2025-05-03 RX ADMIN — MIDODRINE HYDROCHLORIDE 5 MG: 5 TABLET ORAL at 11:05

## 2025-05-03 RX ADMIN — ACETAMINOPHEN 650 MG: 325 TABLET, FILM COATED ORAL at 08:05

## 2025-05-03 RX ADMIN — IPRATROPIUM BROMIDE AND ALBUTEROL SULFATE 3 ML: .5; 3 SOLUTION RESPIRATORY (INHALATION) at 12:05

## 2025-05-03 RX ADMIN — FOLIC ACID 1 MG: 1 TABLET ORAL at 08:05

## 2025-05-03 RX ADMIN — HYDROXYZINE PAMOATE 25 MG: 25 CAPSULE ORAL at 08:05

## 2025-05-03 RX ADMIN — BUMETANIDE 2 MG: 0.25 INJECTION INTRAMUSCULAR; INTRAVENOUS at 02:05

## 2025-05-03 RX ADMIN — FUROSEMIDE 20 MG: 10 INJECTION, SOLUTION INTRAMUSCULAR; INTRAVENOUS at 09:05

## 2025-05-03 RX ADMIN — METHOCARBAMOL 500 MG: 500 TABLET ORAL at 11:05

## 2025-05-03 RX ADMIN — HYDROCORTISONE SODIUM SUCCINATE 50 MG: 100 INJECTION, POWDER, FOR SOLUTION INTRAMUSCULAR; INTRAVENOUS at 05:05

## 2025-05-03 RX ADMIN — SODIUM CHLORIDE: 9 INJECTION, SOLUTION INTRAVENOUS at 04:05

## 2025-05-03 RX ADMIN — MIDODRINE HYDROCHLORIDE 10 MG: 5 TABLET ORAL at 08:05

## 2025-05-03 RX ADMIN — RIVAROXABAN 20 MG: 10 TABLET, FILM COATED ORAL at 05:05

## 2025-05-03 RX ADMIN — ATORVASTATIN CALCIUM 80 MG: 40 TABLET, FILM COATED ORAL at 08:05

## 2025-05-03 RX ADMIN — DOBUTAMINE HYDROCHLORIDE 5 MCG/KG/MIN: 400 INJECTION INTRAVENOUS at 04:05

## 2025-05-03 RX ADMIN — PROCHLORPERAZINE EDISYLATE 5 MG: 5 INJECTION INTRAMUSCULAR; INTRAVENOUS at 05:05

## 2025-05-03 NOTE — PT/OT/SLP PROGRESS
Physical Therapy    Missed Treatment Session - cancel note - 05/03/2025    Patient Name:  Ran Reyes Jr.   MRN:  [id number]      Patient not seen at this time secondary to Other (Comment) (nurse stated patient is possible transferring to ICU.).    Will follow-up as patient is appropriate/available/agreeable to participate and as therapists' schedule allows.  Should patient transfer to ICU will need new orders to resume Physical Therapy.

## 2025-05-03 NOTE — H&P
[facility] - [address] [place] [address] Telemetry  Pulmonary Critical Care Note    Patient Name: Ran Reyes Jr.  MRN: [id number]  Admission Date: 4/21/2025  Hospital Length of Stay: 11 days  Code Status: Full Code  Attending Provider: Lennox Vinson MD  Primary Care Provider: Abiodun Recinos DO     Subjective:     HPI:   Ran Reyes Jr. is a 69 y.o. male with  PMH of tobacco dependence, chronic obstructive pulmonary disease, hypertension, pulmonary hypertension, hyperlipidemia, chronic persistent atrial fibrillation on Xarelto, nonobstructive coronary artery disease, nonischemic cardiomyopathy, heart failure with with reduced ejection fraction (EF 30%), peripheral vascular disease s/p stenting to superficial femoral artery and right tibial artery, renée's gangrene (03/2024), primary open-angle glaucoma, who presented to [facility] ED (4/21/2025) due to generalized fatigue and weakness x 3-5 days. Patient reports he can only ambulate about 20-30 feet before having to stop due to claudication pain which is chronic and unchanged compared to baseline. Since running out of his diuretic medications approximately 5 days ago he has noted progressively worsening lower extremity swelling causing leg heaviness and weakness exacerbating difficulty ambulating.  He also notes acute on chronic cough productive of a brownish sputum without hemoptysis.  Denies fever, chills, sweats.  Denies chest pain.  Endorses shortness of breath at rest and with ambulation but unchanged compared to baseline.  Denies abdominal pain, nausea, vomiting, constipation, diarrhea.  Denies increased or decreased urinary frequency, dysuria, or hematuria.  Sleeps with 2 pillows at night due to baseline orthopnea. Has not had to increase number of pillows or change sleeping habits. Wears 2L NC home oxygen at baseline for hx of COPD. Denies having increased oxygen requirements prior to ED presentation.     ED course:  Vital signs stable.  Oxygenation stable  on 2 L nasal cannula.  CBC shows microcytic anemic (HGB 9.0; MCV 77).  CMP shows hyponatremia (Na 128), acidemia (CO2 14), elevated renal indices (BUN 25; creatinine 1.93), elevated alkaline phosphatase (). Troponin WNL.  BNP 26 39.5.  UDS negative.  EKG showed atrial fibrillation with PVCs.  Chest x-ray difficult to interpret due to overlying medical devices however appears to show cardiomegaly with bilateral pulmonary vascular congestion with question of bilateral pleural effusions. He was admitted for acute CHF exacerbation and cardiorenal syndrome.        Hospital Course/Significant events:  4/23/25: Admit to ICU, started on pressors and inotropes for cardiogenic / septic shock   5/2/25: Downgraded to    5/3/25: Re-upgraded to ICU 2/2 worsening lactate and agonal breathing     24 Hour Interval History:  Pending    Past Medical History:   Diagnosis Date    A-fib     Anticoagulant long-term use     Anxiety     Aortic aneurysm     Cataract     CHF (congestive heart failure)     Chronic atrial fibrillation     COPD (chronic obstructive pulmonary disease)     Coronary artery disease     Depression     HLD (hyperlipidemia)     Hypertension     Mitral regurgitation     PAD (peripheral artery disease)     Primary open angle glaucoma (POAG)        Past Surgical History:   Procedure Laterality Date    ANGIOGRAM, CORONARY, WITH LEFT HEART CATHETERIZATION N/A 03/26/2024    Procedure: Angiogram, Coronary, with Left Heart Cath;  Surgeon: Adriano Montiel MD;  Location: [facility] CATH LAB;  Service: Cardiology;  Laterality: N/A;    ATHERECTOMY OF PERIPHERAL VESSEL Left 09/12/2022    LEFT SFA ATHERECTOMY, BALLOON ANGIOPLASTY    CATARACT EXTRACTION W/  INTRAOCULAR LENS IMPLANT Left 03/09/2023    Procedure: EXTRACTION, CATARACT, WITH IOL INSERTION;  Surgeon: Georgi Borja MD;  Location: [facility];  Service: Ophthalmology;  Laterality: Left;  19.5  mac    EGD, WITH CLOSED BIOPSY  03/22/2024    Procedure: EGD, WITH CLOSED  BIOPSY;  Surgeon: Ronald Calderon MD;  Location: [facility] ENDOSCOPY;  Service: Gastroenterology;;    ESOPHAGOGASTRODUODENOSCOPY N/A 03/22/2024    Procedure: EGD;  Surgeon: Ronald Calderon MD;  Location: [facility] ENDOSCOPY;  Service: Gastroenterology;  Laterality: N/A;    Heart Stent N/A     > 10yrs. AGO    INCISION AND DRAINAGE N/A 03/18/2024    Procedure: Incision and Drainage;  Surgeon: Fahad Rivas MD;  Location: [facility] OR;  Service: Urology;  Laterality: N/A;  I&D SCROTAL ABSCESS    INSERTION OF STENT INTO PERIPHERAL VESSEL Right 10/17/2022    RIGHT SFA ATHERECTOMY, BALLOON ANGIOPLASTY, STENT; RIGHT ANTERIOR TIBIAL ARTERY ATHERECTOMY, BALLOON ANGIOPLASTY    ORCHIECTOMY Left 03/18/2024    Procedure: ORCHIECTOMY;  Surgeon: Fahad Rivas MD;  Location: [facility];  Service: Urology;  Laterality: Left;       Social History[1]    Current Outpatient Medications   Medication Instructions    acetaminophen 650 mg, 2 times daily PRN    ALBUTEROL INHL 2 puffs, Every 6 hours PRN    albuterol-ipratropium (DUO-NEB) 2.5 mg-0.5 mg/3 mL nebulizer solution 3 mLs, Nebulization, Every 6 hours PRN, Rescue    aspirin (ECOTRIN) 81 MG EC tablet 1 tablet, Daily    atorvastatin (LIPITOR) 80 mg, Oral, Daily    BREZTRI AEROSPHERE 160-9-4.8 mcg/actuation HFAA 2 puffs, 2 times daily    cetirizine (ZYRTEC) 10 mg, Oral, Daily    clopidogreL (PLAVIX) 75 mg, Oral, Daily    folic acid (FOLVITE) 1 mg, Oral, Daily    furosemide (LASIX) 40 mg, Oral, 2 times daily    gabapentin (NEURONTIN) 300 mg, Oral, 3 times daily    metoprolol succinate (TOPROL-XL) 12.5 mg, Oral, Daily    nicotine (NICODERM CQ) 14 mg/24 hr 1 patch, Transdermal, Daily PRN    nicotine (NICODERM CQ) 21 mg/24 hr 1 patch, Transdermal, Daily    nitrofurantoin, macrocrystal-monohydrate, (MACROBID) 100 MG capsule 100 mg, Oral, 2 times daily    olopatadine (PATANOL) 0.1 % ophthalmic solution Apply to eye.    pantoprazole (PROTONIX) 40 mg, Oral, 2 times daily     potassium chloride SA (K-DUR,KLOR-CON) 20 MEQ tablet 20 mEq, Oral, 2 times daily    rivaroxaban (XARELTO) 20 mg, Oral, Daily    sacubitriL-valsartan (ENTRESTO) 49-51 mg per tablet 1 tablet, Daily    sertraline (ZOLOFT) 100 mg, Oral, Daily    urea (CARMOL) 40 % Crea Apply topically.    varenicline tartrate (CHANTIX) 0.5 mg    vitamin D (VITAMIN D3) 1,000 Units, Daily     Current Inpatient Medications   atorvastatin  80 mg Oral Daily    bumetanide  2 mg Intravenous Once    clopidogreL  75 mg Oral Daily    folic acid  1 mg Oral Daily    hydrocortisone sodium succinate  50 mg Intravenous Q6H    midodrine  5 mg Oral TID WM    pantoprazole  40 mg Oral BID    rivaroxaban  20 mg Oral Daily    vitamin D  1,000 Units Oral Daily     Current Intravenous Infusions   DOBUTamine IV infusion (non-titrating)  5 mcg/kg/min Intravenous Continuous         ROS: neg unless stated above        Objective:     Intake/Output Summary (Last 24 hours) at 5/3/2025 1339  Last data filed at 5/3/2025 1334  Gross per 24 hour   Intake 1515 ml   Output 400 ml   Net 1115 ml     Vital Signs (Most Recent):  Temp: 97.7 °F (36.5 °C) (05/03/25 1146)  Pulse: 89 (05/03/25 1146)  Resp: 18 (05/03/25 1146)  BP: 110/79 (05/03/25 1146)  SpO2: 100 % (05/03/25 1146)  Body mass index is 27.37 kg/m².  Weight: 91.5 kg (201 lb 12.8 oz) Vital Signs (24h Range):  Temp:  [97.4 °F (36.3 °C)-97.7 °F (36.5 °C)] 97.7 °F (36.5 °C)  Pulse:  [62-90] 89  Resp:  [18-26] 18  SpO2:  [95 %-100 %] 100 %  BP: (100-115)/(65-80) 110/79     Physical Exam:  General: Non-distressed but agonal breathing   HEENT: NC/AT; PERRL; nasal and oral mucosa moist and clear  Pulm: coarse bilaterally, normal work of breathing  CV: S1, S2 irregular irregular w/o murmurs or gallops; 2+ edema noted  GI: Bowel sound present in all quadrants, abdomen soft to palpation  MSK: Full ROM of all extremities and spine w/o limitation or discomfort  Derm: No rashes, abnormal bruising, or skin lesions  Neuro: AAOx4;  motor/sensory function intact  Psych: Cooperative; appropriate mood and affect    Lines/Drains/Airways       Peripherally Inserted Central Catheter Line  Duration             PICC Triple Lumen 04/24/25 0942 right brachial 9 days              Peripheral Intravenous Line  Duration                  Peripheral IV - Single Lumen 04/22/25 0000 20 G Left;Posterior Forearm 11 days                  Significant Labs:  Lab Results   Component Value Date    WBC 7.41 05/03/2025    HGB 8.2 (L) 05/03/2025    HCT 25.8 (L) 05/03/2025    MCV 76.3 (L) 05/03/2025     05/03/2025       BMP  Lab Results   Component Value Date     (L) 05/03/2025    K 4.4 05/03/2025    CO2 16 (L) 05/03/2025    BUN 42.5 (H) 05/03/2025    CREATININE 2.08 (H) 05/03/2025    CALCIUM 10.0 05/03/2025    AGAP 23.0 05/03/2025    EGFRNONAA 88 (L) 12/06/2021     ABG  Recent Labs   Lab 05/03/25  1233   PH 7.400   PO2 42.0*   PCO2 31.0*   HCO3 19.2*     Mechanical Ventilation Support:  Oxygen Concentration (%): 28 (05/03/25 0730)    Significant Imaging:  I have reviewed the pertinent imaging within the past 24 hours.    Assessment/Plan:     Assessment  Cardiogenic Shock (worsened)  Lactic Acidosis   HFrEF (22%, G3DD), Pulmonary Hypertension  NOCAD, NICMO  Chronic persistent Afib  Hx of VT arrest (has life vest)  ELIZABETH   Adrenal Insufficiency   COPD   Microcytic Anemia     Plan  -Reupgraded to ICU ICU level of care for ongoing monitoring and medical management  -Continue Dobutamine 5 and Hydrocortisone 50 q6hr; low threshold to add levophed for MAP goal >60  -Bumex 2 mg IV x1 prior to upgrade, will plan for another 2 mg if response is inadequate  -R/p Lactic acid q2hrs, BMP and VBG q4    - Trending SvO2 and correlating to CO   -Low threshold to reach out to [facility] to determine if patient is a candidate for impella   -Hold ASA and beta blocker; continue plavix   -Avoid nephrotoxins   - has prn duonebs q6 prn scheduled  -Strict I&O    CODE STATUS: Full  Code  Access: Peripheral  Antibiotics: Zosyn, finished   Diet: Cardiac  DVT Prophylaxis: Xarelto 20 mg  GI Prophylaxis: none needed  Fluids: None     32 minutes of critical care was time spent personally by me on the following activities: development of treatment plan with patient or surrogate and bedside caregivers, discussions with consultants, evaluation of patient's response to treatment, examination of patient, ordering and performing treatments and interventions, ordering and review of laboratory studies, ordering and review of radiographic studies, pulse oximetry, re-evaluation of patient's condition.  This critical care time did not overlap with that of any other provider or involve time for any procedures.     Abiodun Recinos DO PGY-2  Pulmonary Critical Care Medicine  [facility] - [facility] Surg Telemetry         [1]   Social History  Socioeconomic History    Marital status: Single   Tobacco Use    Smoking status: Every Day     Current packs/day: 0.50     Average packs/day: 0.8 packs/day for 50.3 years (40.9 ttl pk-yrs)     Types: Cigarettes     Start date: 1975     Passive exposure: Current    Smokeless tobacco: Never    Tobacco comments:     [facility] smoke 2-3 cigarettes per day   Substance and Sexual Activity    Alcohol use: Yes     Alcohol/week: 3.0 standard drinks of alcohol     Types: 3 Cans of beer per week     Comment: socially    Drug use: Not Currently     Frequency: 1.0 times per week     Types: Marijuana     Comment: no use in over 1 year    Sexual activity: Not Currently     Partners: Female     Social Drivers of Health     Financial Resource Strain: Low Risk  (4/23/2025)    Overall Financial Resource Strain (CARDIA)     Difficulty of Paying Living Expenses: Not hard at all   Food Insecurity: No Food Insecurity (4/23/2025)    Hunger Vital Sign     Worried About Running Out of Food in the Last Year: Never true     Ran Out of Food in the Last Year: Never true   Transportation Needs: No  Transportation Needs (4/23/2025)    PRAPARE - Transportation     Lack of Transportation (Medical): No     Lack of Transportation (Non-Medical): No   Physical Activity: Inactive (12/17/2024)    Exercise Vital Sign     Days of Exercise per Week: 0 days     Minutes of Exercise per Session: 0 min   Stress: No Stress Concern Present (4/23/2025)    [employer] - Occupational Stress Questionnaire     Feeling of Stress : Not at all   Housing Stability: Low Risk  (4/23/2025)    Housing Stability Vital Sign     Unable to Pay for Housing in the Last Year: No     Homeless in the Last Year: No

## 2025-05-03 NOTE — PLAN OF CARE
Problem: Heart Failure  Goal: Optimal Coping  Outcome: Progressing  Goal: Fluid and Electrolyte Balance  Outcome: Progressing     Problem: Fall Injury Risk  Goal: Absence of Fall and Fall-Related Injury  Outcome: Progressing     Problem: Heart Failure  Goal: Optimal Functional Ability  Outcome: Not Progressing

## 2025-05-03 NOTE — PROGRESS NOTES
[facility] Medicine Wards   Progress Note     Resident Team: [facility] Medicine List 3  Attending Physician: Lennox Vinson MD  Resident: Henrietta  Intern: Lico     Subjective:      Brief HPI:  Ran Reyes Jr. is a 69 y.o.  male with PMH of tobacco dependence, chronic obstructive pulmonary disease, hypertension, pulmonary hypertension, hyperlipidemia, chronic persistent atrial fibrillation on Xarelto, nonobstructive coronary artery disease, nonischemic cardiomyopathy, heart failure with with reduced ejection fraction (EF 30%), peripheral vascular disease s/p stenting to superficial femoral artery and right tibial artery, renée's gangrene (03/2024), primary open-angle glaucoma, who presented to [facility] ED (4/21/2025) due to generalized fatigue and weakness x 3-5 days. Patient reports he can only ambulate about 20-30 feet before having to stop due to claudication pain which is chronic and unchanged compared to baseline. Since running out of his diuretic medications approximately 5 days ago he has noted progressively worsening lower extremity swelling causing leg heaviness and weakness exacerbating difficulty ambulating.  He also notes acute on chronic cough productive of a brownish sputum without hemoptysis.  Denies fever, chills, sweats.  Denies chest pain.  Endorses shortness of breath at rest and with ambulation but unchanged compared to baseline.  Denies abdominal pain, nausea, vomiting, constipation, diarrhea.  Denies increased or decreased urinary frequency, dysuria, or hematuria.  Sleeps with 2 pillows at night due to baseline orthopnea. Has not had to increase number of pillows or change sleeping habits. Wears 2L NC home oxygen at baseline for hx of COPD. Denies having increased oxygen requirements prior to ED presentation.     ED course:  Vital signs stable.  Oxygenation stable on 2 L nasal cannula.  CBC shows microcytic anemic (HGB 9.0; MCV 77).  CMP shows hyponatremia (Na 128), acidemia (CO2  14), elevated renal indices (BUN 25; creatinine 1.93), elevated alkaline phosphatase (). Troponin WNL.  BNP 26 39.5.  UDS negative.  EKG showed atrial fibrillation with PVCs.  Chest x-ray difficult to interpret due to overlying medical devices however appears to show cardiomegaly with bilateral pulmonary vascular congestion with question of bilateral pleural effusions. He was admitted for acute CHF exacerbation and cardiorenal syndrome.     Interval History:     Patient had 2 episodes of emesis yesterday - food content, non bloody. Endorses mild abdominal pain but slight improvement after emesis. Maalox did help some. He endorses dyspnea with exertion and currently on 2L O2 via NC.  Received IV Hydrocortisone 100 mg yest and BP has been stable. ACTH normal at 53. CBC stable from prior. CMP now with mild hyponatremia, worsening renal indices BUN/Cr at 36.3/1.91, hypochloremic anion gap metabolic acidosis noted as well. EKG with A fib and prolonged QTC. KUB yesterday with non specific pattern.       Review of Systems:  Constitutional:  Negative for fever.   Eyes:  Negative for blurred vision. Scleral icterus  Respiratory:  Negative for cough, Positive shortness of breath.   Cardiovascular:  Negative for chest pain, palpitations and leg swelling.   Gastrointestinal:  Positive for abdominal pain,  and N/V, negative for constipation, diarrhea   Genitourinary:  Negative for dysuria, frequency and urgency.   Musculoskeletal:  Negative for back pain.   Skin:  Negative for itching.   Neurological:  Negative for dizziness, speech change, focal weakness, weakness and headaches.        Objective:     Vital Signs (Most Recent):  Temp: 97.6 °F (36.4 °C) (05/03/25 0743)  Pulse: 90 (05/03/25 0812)  Resp: 18 (05/03/25 0743)  BP: 115/80 (05/03/25 0812)  SpO2: 98 % (05/03/25 0743) Vital Signs (24h Range):  Temp:  [97.4 °F (36.3 °C)-98.2 °F (36.8 °C)] 97.6 °F (36.4 °C)  Pulse:  [62-90] 90  Resp:  [18-26] 18  SpO2:  [95 %-100 %]  98 %  BP: (100-115)/(47-80) 115/80       Physical Examination:  Constitutional:       General: He is not in acute distress.     Appearance: He is not ill-appearing.   HENT:      Head: Normocephalic and atraumatic.      Right Ear: External ear normal.      Left Ear: External ear normal.   Eyes:      General: No scleral icterus.     Pupils: Pupils are equal, round, and reactive to light.   Cardiovascular:      Rate and Rhythm: Normal rate. Rhythm irregular.      Pulses: Normal pulses.      Heart sounds: Normal heart sounds. No murmur heard.     No friction rub. No gallop. There is bilateral lower extremity edema 2+ pitting   Life vest on  Pulmonary:      Effort: Pulmonary effort is normal. No respiratory distress.      Breath sounds: No stridor. No wheezing, rhonchi; there are mild crackles  Abdominal:      General: Bowel sounds are normal. There is no distension.      Palpations: Abdomen is soft.      Tenderness:. There is no guarding. Abd distension and generalized tenderness  Musculoskeletal:         General: Normal range of motion.      Right lower leg: No edema.      Left lower leg: No edema.   Skin:     General: Skin is warm and dry.      Coloration: Skin is not jaundiced.      Findings: No bruising, erythema or rash.      Comments: Dry skin to back with scratch marks but no rash or lesions   Neurological:      Mental Status: He is alert.      Comments: AAO to person, place, time, and situation; CN II-XII grossly intact   Psychiatric:         Mood and Affect: Mood normal.         Behavior: Behavior normal.     Laboratory:  Trended Lab Data:  Recent Labs   Lab 05/01/25  0411 05/02/25  0556 05/03/25  0542   WBC 5.50 6.52 7.41   HGB 8.5* 8.7* 8.2*   HCT 26.6* 27.1* 25.8*    154 159   MCV 75.1* 75.7* 76.3*   RDW 24.1* 24.3* 24.4*    135* 134*   K 3.1* 3.8 4.3   CL 98 97* 95*   CO2 25 19* 16*   BUN 19.2 24.1 36.3*   CREATININE 0.92 1.28* 1.91*    112 108   PROT 7.6 8.0* 8.0*   ALBUMIN 3.3* 3.4 3.4    BILITOT 4.1* 4.9* 5.4*   AST 35 36 36   ALKPHOS 177* 186* 213*   ALT 19 17 18       Microbiology Data Reviewed:   Pertinent Findings:      Other Results:  EKG (my interpretation): .    Radiology:   No new imaging.    Antibiotics and Day Number of Therapy:      Intake/Output Summary (Last 24 hours) at 5/3/2025 0911  Last data filed at 5/3/2025 0510  Gross per 24 hour   Intake 1037 ml   Output 450 ml   Net 587 ml         Assessment & Plan:   Cardiogenic Shock (Improved)  HFrEF (22%, G3DD), Pulmonary Hypertension  NOCAD, NICMO  Chronic persistent Afib  Hx of VT arrest (has life vest)  Prolonged QTc  - patient with worsened LE edema, abdominal distension, nausea  - Continue with DAPT  - Continue toprol 25 mg daily  - will give another dose of IV Lasix 20 mg today  - lactic acid ordered  - EKG with afib controlled rate and prolonged qtc  - BMP ordered for 1300  - will wean down his Midodrine to 5 mg TIDWM  - if worsening of ELIZABETH despite diuresis and/or worsening of symptoms then low threshold for starting dobutamine for inotropic support.     Abdominal pain  Nausea and vomiting  - 2 episodes of emesis  - generalized but mostly LLQ abd pain on palpation  - likely pain from edema and congestion and KUB nonspecific. will order CT abd/pelvis without contrast to rule out any GI pathology.    ELIZABETH  - worsening today at 1.91, hypervolemic on exam  - likely 2/2 congestion, will give IV diuresis today again    Septic shock 2/2 ESBL E.coli UTI  - Initially had UA with ESBL E. Coli with no symptoms; was SIRS+ and  had low pressures requiring pressors  - patient finished 7d course zosyn  - weaned off pressors  - CTM  - afebrile, WBC 6    Adrenal Insufficiency likely secondary vs tertiary  - AM cortisol low normal, cosyntropin test indicative of adrenal insufficiency  - ACTH normal  - will continue with IV hydrocortisone 50 mg q6h  - will consider MRI of brain/pituitary    COPD  - dyspnea with exertion and on supplemental oxygen  -  has prn duonebs q6 prn scheduled    Anxiety  Insomnia  - prn vistaril   - Qtc prolonged so options limited, must avoid benadryl  - unable to fall asleep, will start Trazodone 25 mg     Microcytic Anemia  - Hgb 8-9 avg  - at baseline, stable and chronic  - CTM  - no signs of overt bleeding  - had previous EGD with chronic duodenitis and gastriti  - will continue Protonix BID    CODE STATUS: Full Code  Access: Peripheral  Antibiotics: Zosyn, finished   Diet: Cardiac  DVT Prophylaxis: Xarelto 20 mg  GI Prophylaxis: none needed  Fluids: None    Disposition: Patient admitted and was treated for cardiogenic shock and possible distributive shock. BP stable on Midodrine. IV steroids initiated for AI. Worsening renal indices and abdominal pain, likely fluid overload as patient is net positive. Will IV diuresis and monitor. Discharge pending hospital course.     Valerio Shrestha MD  Internal Medicine - PGY-2

## 2025-05-04 LAB
ACANTHOCYTES (OLG): ABNORMAL
ALBUMIN SERPL-MCNC: 3.4 G/DL (ref 3.4–4.8)
ALBUMIN/GLOB SERPL: 0.7 RATIO (ref 1.1–2)
ALP SERPL-CCNC: 231 UNIT/L (ref 40–150)
ALT SERPL-CCNC: 20 UNIT/L (ref 0–55)
ANION GAP SERPL CALC-SCNC: 16 MEQ/L
ANION GAP SERPL CALC-SCNC: 17 MEQ/L
ANION GAP SERPL CALC-SCNC: 18 MEQ/L
ANISOCYTOSIS BLD QL SMEAR: ABNORMAL
AST SERPL-CCNC: 45 UNIT/L (ref 11–45)
BASOPHILS # BLD AUTO: 0 X10(3)/MCL
BASOPHILS NFR BLD AUTO: 0 %
BILIRUB SERPL-MCNC: 5 MG/DL
BUN SERPL-MCNC: 46.5 MG/DL (ref 8.4–25.7)
BUN SERPL-MCNC: 46.5 MG/DL (ref 8.4–25.7)
BUN SERPL-MCNC: 48.4 MG/DL (ref 8.4–25.7)
CALCIUM SERPL-MCNC: 9.2 MG/DL (ref 8.8–10)
CALCIUM SERPL-MCNC: 9.4 MG/DL (ref 8.8–10)
CALCIUM SERPL-MCNC: 9.5 MG/DL (ref 8.8–10)
CHLORIDE SERPL-SCNC: 93 MMOL/L (ref 98–107)
CO2 SERPL-SCNC: 20 MMOL/L (ref 23–31)
CO2 SERPL-SCNC: 21 MMOL/L (ref 23–31)
CO2 SERPL-SCNC: 23 MMOL/L (ref 23–31)
CREAT SERPL-MCNC: 1.81 MG/DL (ref 0.72–1.25)
CREAT SERPL-MCNC: 1.97 MG/DL (ref 0.72–1.25)
CREAT SERPL-MCNC: 2.03 MG/DL (ref 0.72–1.25)
CREAT/UREA NIT SERPL: 23
CREAT/UREA NIT SERPL: 24
CREAT/UREA NIT SERPL: 27
ELLIPTOCYTOSIS (OHS): SLIGHT
EOSINOPHIL # BLD AUTO: 0 X10(3)/MCL (ref 0–0.9)
EOSINOPHIL NFR BLD AUTO: 0 %
ERYTHROCYTE [DISTWIDTH] IN BLOOD BY AUTOMATED COUNT: 23.8 % (ref 11.5–17)
GFR SERPLBLD CREATININE-BSD FMLA CKD-EPI: 35 ML/MIN/1.73/M2
GFR SERPLBLD CREATININE-BSD FMLA CKD-EPI: 37 ML/MIN/1.73/M2
GFR SERPLBLD CREATININE-BSD FMLA CKD-EPI: 40 ML/MIN/1.73/M2
GLOBULIN SER-MCNC: 4.6 GM/DL (ref 2.4–3.5)
GLUCOSE SERPL-MCNC: 210 MG/DL (ref 82–115)
GLUCOSE SERPL-MCNC: 217 MG/DL (ref 82–115)
GLUCOSE SERPL-MCNC: 223 MG/DL (ref 82–115)
HCT VFR BLD AUTO: 23.6 % (ref 42–52)
HGB BLD-MCNC: 7.7 G/DL (ref 14–18)
HOLD SPECIMEN: NORMAL
IMM GRANULOCYTES # BLD AUTO: 0.03 X10(3)/MCL (ref 0–0.04)
IMM GRANULOCYTES NFR BLD AUTO: 0.4 %
LACTATE SERPL-SCNC: 2.7 MMOL/L (ref 0.5–2.2)
LACTATE SERPL-SCNC: 2.9 MMOL/L (ref 0.5–2.2)
LACTATE SERPL-SCNC: 3.1 MMOL/L (ref 0.5–2.2)
LACTATE SERPL-SCNC: 3.4 MMOL/L (ref 0.5–2.2)
LACTATE SERPL-SCNC: 4.1 MMOL/L (ref 0.5–2.2)
LYMPHOCYTES # BLD AUTO: 0.54 X10(3)/MCL (ref 0.6–4.6)
LYMPHOCYTES NFR BLD AUTO: 6.4 %
MAGNESIUM SERPL-MCNC: 2.4 MG/DL (ref 1.6–2.6)
MCH RBC QN AUTO: 24.4 PG (ref 27–31)
MCHC RBC AUTO-ENTMCNC: 32.6 G/DL (ref 33–36)
MCV RBC AUTO: 74.7 FL (ref 80–94)
MICROCYTES BLD QL SMEAR: ABNORMAL
MONOCYTES # BLD AUTO: 0.66 X10(3)/MCL (ref 0.1–1.3)
MONOCYTES NFR BLD AUTO: 7.8 %
NEUTROPHILS # BLD AUTO: 7.24 X10(3)/MCL (ref 2.1–9.2)
NEUTROPHILS NFR BLD AUTO: 85.4 %
NRBC BLD AUTO-RTO: 0 %
OVALOCYTES (OLG): ABNORMAL
PHOSPHATE SERPL-MCNC: 3.6 MG/DL (ref 2.3–4.7)
PLATELET # BLD AUTO: 147 X10(3)/MCL (ref 130–400)
PLATELET # BLD EST: ADEQUATE 10*3/UL
PLATELETS.RETICULATED NFR BLD AUTO: 4.6 % (ref 0.9–11.2)
PMV BLD AUTO: ABNORMAL FL
POCT GLUCOSE: 166 MG/DL (ref 70–110)
POIKILOCYTOSIS BLD QL SMEAR: ABNORMAL
POLYCHROMASIA BLD QL SMEAR: SLIGHT
POTASSIUM SERPL-SCNC: 4 MMOL/L (ref 3.5–5.1)
POTASSIUM SERPL-SCNC: 4.2 MMOL/L (ref 3.5–5.1)
POTASSIUM SERPL-SCNC: 4.3 MMOL/L (ref 3.5–5.1)
PROT SERPL-MCNC: 8 GM/DL (ref 5.8–7.6)
RBC # BLD AUTO: 3.16 X10(6)/MCL (ref 4.7–6.1)
SODIUM SERPL-SCNC: 131 MMOL/L (ref 136–145)
SODIUM SERPL-SCNC: 131 MMOL/L (ref 136–145)
SODIUM SERPL-SCNC: 132 MMOL/L (ref 136–145)
TARGETS BLD QL SMEAR: ABNORMAL
TROPONIN I SERPL-MCNC: 0.05 NG/ML (ref 0–0.04)
WBC # BLD AUTO: 8.47 X10(3)/MCL (ref 4.5–11.5)

## 2025-05-04 PROCEDURE — 63600175 PHARM REV CODE 636 W HCPCS: Mod: JZ,TB

## 2025-05-04 PROCEDURE — 83605 ASSAY OF LACTIC ACID: CPT

## 2025-05-04 PROCEDURE — 25000003 PHARM REV CODE 250

## 2025-05-04 PROCEDURE — 27000221 HC OXYGEN, UP TO 24 HOURS

## 2025-05-04 PROCEDURE — 20000000 HC ICU ROOM

## 2025-05-04 PROCEDURE — 36415 COLL VENOUS BLD VENIPUNCTURE: CPT | Performed by: STUDENT IN AN ORGANIZED HEALTH CARE EDUCATION/TRAINING PROGRAM

## 2025-05-04 PROCEDURE — 84484 ASSAY OF TROPONIN QUANT: CPT

## 2025-05-04 PROCEDURE — 36415 COLL VENOUS BLD VENIPUNCTURE: CPT

## 2025-05-04 PROCEDURE — 80048 BASIC METABOLIC PNL TOTAL CA: CPT

## 2025-05-04 PROCEDURE — 84100 ASSAY OF PHOSPHORUS: CPT

## 2025-05-04 PROCEDURE — 27000207 HC ISOLATION

## 2025-05-04 PROCEDURE — 99900035 HC TECH TIME PER 15 MIN (STAT)

## 2025-05-04 PROCEDURE — 85025 COMPLETE CBC W/AUTO DIFF WBC: CPT

## 2025-05-04 PROCEDURE — 25000242 PHARM REV CODE 250 ALT 637 W/ HCPCS

## 2025-05-04 PROCEDURE — 94640 AIRWAY INHALATION TREATMENT: CPT

## 2025-05-04 PROCEDURE — 80053 COMPREHEN METABOLIC PANEL: CPT

## 2025-05-04 PROCEDURE — 63600175 PHARM REV CODE 636 W HCPCS

## 2025-05-04 PROCEDURE — 83735 ASSAY OF MAGNESIUM: CPT

## 2025-05-04 PROCEDURE — 94761 N-INVAS EAR/PLS OXIMETRY MLT: CPT

## 2025-05-04 PROCEDURE — 93005 ELECTROCARDIOGRAM TRACING: CPT

## 2025-05-04 RX ORDER — BUMETANIDE 0.25 MG/ML
2 INJECTION, SOLUTION INTRAMUSCULAR; INTRAVENOUS ONCE
Status: COMPLETED | OUTPATIENT
Start: 2025-05-04 | End: 2025-05-04

## 2025-05-04 RX ORDER — MICONAZOLE NITRATE 2 G/100G
POWDER TOPICAL 2 TIMES DAILY
Status: DISCONTINUED | OUTPATIENT
Start: 2025-05-04 | End: 2025-05-20 | Stop reason: HOSPADM

## 2025-05-04 RX ORDER — INSULIN ASPART 100 [IU]/ML
0-5 INJECTION, SOLUTION INTRAVENOUS; SUBCUTANEOUS
Status: DISCONTINUED | OUTPATIENT
Start: 2025-05-04 | End: 2025-05-20 | Stop reason: HOSPADM

## 2025-05-04 RX ORDER — BUMETANIDE 0.25 MG/ML
1 INJECTION, SOLUTION INTRAMUSCULAR; INTRAVENOUS ONCE
Status: COMPLETED | OUTPATIENT
Start: 2025-05-04 | End: 2025-05-04

## 2025-05-04 RX ADMIN — RIVAROXABAN 20 MG: 10 TABLET, FILM COATED ORAL at 04:05

## 2025-05-04 RX ADMIN — MIDODRINE HYDROCHLORIDE 5 MG: 5 TABLET ORAL at 04:05

## 2025-05-04 RX ADMIN — IPRATROPIUM BROMIDE AND ALBUTEROL SULFATE 3 ML: .5; 3 SOLUTION RESPIRATORY (INHALATION) at 04:05

## 2025-05-04 RX ADMIN — HYDROCORTISONE SODIUM SUCCINATE 50 MG: 100 INJECTION, POWDER, FOR SOLUTION INTRAMUSCULAR; INTRAVENOUS at 11:05

## 2025-05-04 RX ADMIN — PANTOPRAZOLE SODIUM 40 MG: 40 TABLET, DELAYED RELEASE ORAL at 08:05

## 2025-05-04 RX ADMIN — MICONAZOLE NITRATE: 20 POWDER TOPICAL at 08:05

## 2025-05-04 RX ADMIN — DOBUTAMINE HYDROCHLORIDE 5 MCG/KG/MIN: 400 INJECTION INTRAVENOUS at 11:05

## 2025-05-04 RX ADMIN — ACETAMINOPHEN 650 MG: 325 TABLET, FILM COATED ORAL at 11:05

## 2025-05-04 RX ADMIN — BUMETANIDE 2 MG: 0.25 INJECTION, SOLUTION INTRAMUSCULAR; INTRAVENOUS at 09:05

## 2025-05-04 RX ADMIN — HYDROXYZINE PAMOATE 25 MG: 25 CAPSULE ORAL at 11:05

## 2025-05-04 RX ADMIN — TRAZODONE HYDROCHLORIDE 25 MG: 50 TABLET ORAL at 08:05

## 2025-05-04 RX ADMIN — INSULIN ASPART 1 UNITS: 100 INJECTION, SOLUTION INTRAVENOUS; SUBCUTANEOUS at 09:05

## 2025-05-04 RX ADMIN — IPRATROPIUM BROMIDE AND ALBUTEROL SULFATE 3 ML: .5; 3 SOLUTION RESPIRATORY (INHALATION) at 07:05

## 2025-05-04 RX ADMIN — HYDROCORTISONE SODIUM SUCCINATE 50 MG: 100 INJECTION, POWDER, FOR SOLUTION INTRAMUSCULAR; INTRAVENOUS at 05:05

## 2025-05-04 RX ADMIN — MIDODRINE HYDROCHLORIDE 5 MG: 5 TABLET ORAL at 11:05

## 2025-05-04 RX ADMIN — BUMETANIDE 1 MG: 0.25 INJECTION, SOLUTION INTRAMUSCULAR; INTRAVENOUS at 11:05

## 2025-05-04 RX ADMIN — MICONAZOLE NITRATE: 20 POWDER TOPICAL at 09:05

## 2025-05-04 RX ADMIN — Medication 1000 UNITS: at 09:05

## 2025-05-04 RX ADMIN — MIDODRINE HYDROCHLORIDE 5 MG: 5 TABLET ORAL at 07:05

## 2025-05-04 RX ADMIN — ATORVASTATIN CALCIUM 80 MG: 40 TABLET, FILM COATED ORAL at 09:05

## 2025-05-04 RX ADMIN — ACETAMINOPHEN 650 MG: 325 TABLET, FILM COATED ORAL at 03:05

## 2025-05-04 RX ADMIN — PANTOPRAZOLE SODIUM 40 MG: 40 TABLET, DELAYED RELEASE ORAL at 09:05

## 2025-05-04 RX ADMIN — CLOPIDOGREL BISULFATE 75 MG: 75 TABLET, FILM COATED ORAL at 09:05

## 2025-05-04 RX ADMIN — FOLIC ACID 1 MG: 1 TABLET ORAL at 09:05

## 2025-05-04 RX ADMIN — SODIUM CHLORIDE 125 MG: 9 INJECTION, SOLUTION INTRAVENOUS at 09:05

## 2025-05-04 NOTE — PLAN OF CARE
Problem: Skin Injury Risk Increased  Goal: Skin Health and Integrity  Outcome: Progressing     Problem: Adult Inpatient Plan of Care  Goal: Plan of Care Review  Outcome: Progressing  Goal: Patient-Specific Goal (Individualized)  Outcome: Progressing  Goal: Absence of Hospital-Acquired Illness or Injury  Outcome: Progressing  Goal: Optimal Comfort and Wellbeing  Outcome: Progressing  Goal: Readiness for Transition of Care  Outcome: Progressing     Problem: COPD (Chronic Obstructive Pulmonary Disease)  Goal: Optimal Chronic Illness Coping  Outcome: Progressing  Goal: Optimal Level of Functional [place]  Outcome: Progressing  Goal: Absence of Infection Signs and Symptoms  Outcome: Progressing  Goal: Improved Oral Intake  Outcome: Progressing     Problem: Heart Failure  Goal: Optimal Coping  Outcome: Progressing  Goal: Optimal Cardiac Output  Outcome: Progressing  Goal: Stable Heart Rate and Rhythm  Outcome: Progressing  Goal: Optimal Functional Ability  Outcome: Progressing  Goal: Fluid and Electrolyte Balance  Outcome: Progressing  Goal: Improved Oral Intake  Outcome: Progressing     Problem: Fall Injury Risk  Goal: Absence of Fall and Fall-Related Injury  Outcome: Progressing     Problem: Wound  Goal: Optimal Coping  Outcome: Progressing  Goal: Optimal Functional Ability  Outcome: Progressing  Goal: Absence of Infection Signs and Symptoms  Outcome: Progressing  Goal: Improved Oral Intake  Outcome: Progressing  Goal: Optimal Pain Control and Function  Outcome: Progressing  Goal: Skin Health and Integrity  Outcome: Progressing  Goal: Optimal Wound Healing  Outcome: Progressing     Problem: Acute Kidney Injury/Impairment  Goal: Fluid and Electrolyte Balance  Outcome: Progressing  Goal: Improved Oral Intake  Outcome: Progressing  Goal: Effective Renal Function  Outcome: Progressing     Problem: Dysrhythmia  Goal: Normalized Cardiac Rhythm  Outcome: Progressing     Problem: Infection  Goal: Absence of Infection  Signs and Symptoms  Outcome: Progressing

## 2025-05-04 NOTE — CARE UPDATE
I have reviewed labs and notes from yesterday and have personally interviewed patient today.    He remains on Dobutamine 5 mcg/min and never required Levophed. MAP been well over 65. He was started on IV hydrocortisone 50 mg q6h and midodrine was weaned down to 5 mg TID. Patient received dose of IV Bumex 2 mg yesterday and UOP of 800 ml noted. Afebrile and remains on 4 L oxygen via nasal canula. He did have NSVT of 13 beats yesterday around 0300. Remained asymptomatic. He has a Lifevest on and confirmed that his batteries were charged. CBC essentially stable from prior. CMP with hyponatremia 131, improved anion gap acidosis and improving renal indices now at BUN/Cr of 46.5/1.97. Lactic is down trending with last one at 2.9. He c/o itching around the groin area and fatigue. No other acute complaints. CT abd/pelvis wo contrast showed minimal b/l pleural effusion, possible cirrhosis (likely congestive hepatopathy), small sccular infrarenal AAA and prominence of bladder wall (pt just finished 7 day course of IV Zosyn)    Physical Exam  Vitals and nursing note reviewed.   Constitutional:       General: He is not in acute distress.     Appearance: He is obese.   HENT:      Head: Normocephalic and atraumatic.      Mouth/Throat:      Mouth: Mucous membranes are moist.   Eyes:      General: Scleral icterus present.      Pupils: Pupils are equal, round, and reactive to light.   Cardiovascular:      Rate and Rhythm: Normal rate. Rhythm irregular.      Heart sounds: No murmur heard.  Pulmonary:      Effort: No respiratory distress.      Breath sounds: Normal breath sounds. No wheezing, rhonchi or rales.      Comments: On 4L supplemental oxygen via NC  Abdominal:      General: There is distension.      Tenderness: There is abdominal tenderness (lower quadrant on deep palpation). There is no guarding.   Genitourinary:     Comments: No scrotal edema  NO rash or erythema or overt signs on infection  Musculoskeletal:      Right  lower leg: Edema present.      Left lower leg: Edema present.   Neurological:      General: No focal deficit present.      Mental Status: He is oriented to person, place, and time.   Psychiatric:         Mood and Affect: Mood normal.         Behavior: Behavior normal.       Assessment/Plan:      Assessment  Cardiogenic Shock  Lactic Acidosis   HFrEF (22%, G3DD), Pulmonary Hypertension  NOCAD, NICMO  Chronic persistent Afib  Hx of VT arrest (has life vest)  ELIZABETH   Adrenal Insufficiency   Low cortisol on ata stim test and normal plast ACTH level  COPD   Microcytic Anemia   Low TSAT 7%  Groin itch     Plan  - Continue ongoing ICU level of care  - Continue Dobutamine 5 mcg/min and IV Hydrocortisone 50 q6hr  - Continue Midodrine 5 mg TID  - Will give one dose of IV Bumex 2 mg today; Recheck BMP 1300  - Trend lactic acid q6h now  - One dose ferric gluconate ordered  - Discontinue ASA, Continue plavix and statin  - Continue Xarelto 20 mg daily  - Hold beta blocker until patient euvolemic again  - Avoid nephrotoxins   - has prn duonebs q6 prn scheduled  - Strict I&O and daily weights  - LDSSI added today  - Will add Miconazole powder BID   - will consider brain/pituitary MRI for AI     CODE STATUS: Full Code  Access: PICC and PIV  Antibiotics: none  Diet: Cardiac  DVT Prophylaxis: Xarelto 20 mg  GI Prophylaxis: PPI  Fluids: None    Valerio Shrestha MD  Internal Medicine - PGY-2

## 2025-05-05 LAB
A-ADO2 BLOOD GAS (OHS): 146 MMHG
ALBUMIN SERPL-MCNC: 3.4 G/DL (ref 3.4–4.8)
ALBUMIN/GLOB SERPL: 0.7 RATIO (ref 1.1–2)
ALLENS TEST BLOOD GAS (OHS): YES
ALP SERPL-CCNC: 220 UNIT/L (ref 40–150)
ALT SERPL-CCNC: 25 UNIT/L (ref 0–55)
ANION GAP SERPL CALC-SCNC: 16 MEQ/L
ANION GAP SERPL CALC-SCNC: 18 MEQ/L
ANION GAP SERPL CALC-SCNC: 18 MEQ/L
AST SERPL-CCNC: 62 UNIT/L (ref 11–45)
BASE EXCESS BLD CALC-SCNC: 1.3 MMOL/L (ref -2–2)
BASOPHILS # BLD AUTO: 0.01 X10(3)/MCL
BASOPHILS NFR BLD AUTO: 0.1 %
BILIRUB SERPL-MCNC: 4.7 MG/DL
BLOOD GAS SAMPLE TYPE (OHS): ABNORMAL
BUN SERPL-MCNC: 51.9 MG/DL (ref 8.4–25.7)
BUN SERPL-MCNC: 58.6 MG/DL (ref 8.4–25.7)
BUN SERPL-MCNC: 59.6 MG/DL (ref 8.4–25.7)
CALCIUM SERPL-MCNC: 9.1 MG/DL (ref 8.8–10)
CHLORIDE SERPL-SCNC: 91 MMOL/L (ref 98–107)
CHLORIDE SERPL-SCNC: 92 MMOL/L (ref 98–107)
CHLORIDE SERPL-SCNC: 92 MMOL/L (ref 98–107)
CO2 BLDA-SCNC: 25.2 MMOL/L (ref 22–26)
CO2 SERPL-SCNC: 21 MMOL/L (ref 23–31)
CO2 SERPL-SCNC: 22 MMOL/L (ref 23–31)
CO2 SERPL-SCNC: 23 MMOL/L (ref 23–31)
COHGB MFR BLDA: 2.5 % (ref 0.5–1.5)
CREAT SERPL-MCNC: 1.84 MG/DL (ref 0.72–1.25)
CREAT SERPL-MCNC: 2.07 MG/DL (ref 0.72–1.25)
CREAT SERPL-MCNC: 2.08 MG/DL (ref 0.72–1.25)
CREAT/UREA NIT SERPL: 28
CREAT/UREA NIT SERPL: 28
CREAT/UREA NIT SERPL: 29
DRAWN BY BLOOD GAS (OHS): ABNORMAL
EOSINOPHIL # BLD AUTO: 0 X10(3)/MCL (ref 0–0.9)
EOSINOPHIL NFR BLD AUTO: 0 %
ERYTHROCYTE [DISTWIDTH] IN BLOOD BY AUTOMATED COUNT: 24.3 % (ref 11.5–17)
GAS PNL BLD: 218 MMHG
GFR SERPLBLD CREATININE-BSD FMLA CKD-EPI: 34 ML/MIN/1.73/M2
GFR SERPLBLD CREATININE-BSD FMLA CKD-EPI: 34 ML/MIN/1.73/M2
GFR SERPLBLD CREATININE-BSD FMLA CKD-EPI: 40 ML/MIN/1.73/M2
GLOBULIN SER-MCNC: 4.6 GM/DL (ref 2.4–3.5)
GLUCOSE SERPL-MCNC: 165 MG/DL (ref 82–115)
GLUCOSE SERPL-MCNC: 170 MG/DL (ref 82–115)
GLUCOSE SERPL-MCNC: 226 MG/DL (ref 82–115)
HCO3 BLDA-SCNC: 24.2 MMOL/L (ref 22–26)
HCT VFR BLD AUTO: 24.2 % (ref 42–52)
HGB BLD-MCNC: 7.8 G/DL (ref 14–18)
HOLD SPECIMEN: NORMAL
IMM GRANULOCYTES # BLD AUTO: 0.05 X10(3)/MCL (ref 0–0.04)
IMM GRANULOCYTES NFR BLD AUTO: 0.4 %
INHALED O2 CONCENTRATION: 36 %
LACTATE SERPL-SCNC: 4 MMOL/L (ref 0.5–2.2)
LACTATE SERPL-SCNC: 4.4 MMOL/L (ref 0.5–2.2)
LACTATE SERPL-SCNC: 4.8 MMOL/L (ref 0.5–2.2)
LACTATE SERPL-SCNC: 5 MMOL/L (ref 0.5–2.2)
LACTATE SERPL-SCNC: 5 MMOL/L (ref 0.5–2.2)
LACTATE SERPL-SCNC: 5.1 MMOL/L (ref 0.5–2.2)
LACTATE SERPL-SCNC: 5.4 MMOL/L (ref 0.5–2.2)
LACTATE SERPL-SCNC: 5.9 MMOL/L (ref 0.5–2.2)
LPM (OHS): 4
LYMPHOCYTES # BLD AUTO: 0.35 X10(3)/MCL (ref 0.6–4.6)
LYMPHOCYTES NFR BLD AUTO: 3.1 %
MAGNESIUM SERPL-MCNC: 2.3 MG/DL (ref 1.6–2.6)
MAGNESIUM SERPL-MCNC: 2.3 MG/DL (ref 1.6–2.6)
MAGNESIUM SERPL-MCNC: 2.4 MG/DL (ref 1.6–2.6)
MCH RBC QN AUTO: 24.5 PG (ref 27–31)
MCHC RBC AUTO-ENTMCNC: 32.2 G/DL (ref 33–36)
MCV RBC AUTO: 75.9 FL (ref 80–94)
METHGB MFR BLDA: 0.6 % (ref 0–1.5)
MONOCYTES # BLD AUTO: 0.8 X10(3)/MCL (ref 0.1–1.3)
MONOCYTES NFR BLD AUTO: 7.2 %
NEUTROPHILS # BLD AUTO: 9.93 X10(3)/MCL (ref 2.1–9.2)
NEUTROPHILS NFR BLD AUTO: 89.2 %
NRBC BLD AUTO-RTO: 0.4 %
O2 HB BLOOD GAS (OHS): 93.7 % (ref 94–100)
OXYGEN DEVICE BLOOD GAS (OHS): ABNORMAL
OXYHGB MFR BLDA: 9.5 G/DL (ref 12–18)
PCO2 BLDA: 31 MMHG (ref 35–45)
PH BLDA: 7.5 [PH] (ref 7.35–7.45)
PHOSPHATE SERPL-MCNC: 3 MG/DL (ref 2.3–4.7)
PHOSPHATE SERPL-MCNC: 3.4 MG/DL (ref 2.3–4.7)
PHOSPHATE SERPL-MCNC: 3.6 MG/DL (ref 2.3–4.7)
PLATELET # BLD AUTO: 148 X10(3)/MCL (ref 130–400)
PLATELETS.RETICULATED NFR BLD AUTO: 5.2 % (ref 0.9–11.2)
PMV BLD AUTO: ABNORMAL FL
PO2 BLDA: 72 MMHG (ref 75–100)
POCT GLUCOSE: 160 MG/DL (ref 70–110)
POCT GLUCOSE: 176 MG/DL (ref 70–110)
POCT GLUCOSE: 230 MG/DL (ref 70–110)
POCT GLUCOSE: 253 MG/DL (ref 70–110)
POTASSIUM SERPL-SCNC: 4.2 MMOL/L (ref 3.5–5.1)
POTASSIUM SERPL-SCNC: 4.3 MMOL/L (ref 3.5–5.1)
POTASSIUM SERPL-SCNC: 4.3 MMOL/L (ref 3.5–5.1)
PROT SERPL-MCNC: 8 GM/DL (ref 5.8–7.6)
RBC # BLD AUTO: 3.19 X10(6)/MCL (ref 4.7–6.1)
SAMPLE SITE BLOOD GAS (OHS): ABNORMAL
SAO2 % BLDA: 96.7 %
SODIUM SERPL-SCNC: 131 MMOL/L (ref 136–145)
TROPONIN I SERPL-MCNC: 0.06 NG/ML (ref 0–0.04)
TROPONIN I SERPL-MCNC: 0.07 NG/ML (ref 0–0.04)
TROPONIN I SERPL-MCNC: 0.08 NG/ML (ref 0–0.04)
TROPONIN I SERPL-MCNC: 0.08 NG/ML (ref 0–0.04)
WBC # BLD AUTO: 11.14 X10(3)/MCL (ref 4.5–11.5)

## 2025-05-05 PROCEDURE — 63600175 PHARM REV CODE 636 W HCPCS

## 2025-05-05 PROCEDURE — 63600175 PHARM REV CODE 636 W HCPCS: Mod: JZ,TB

## 2025-05-05 PROCEDURE — 84100 ASSAY OF PHOSPHORUS: CPT

## 2025-05-05 PROCEDURE — 36600 WITHDRAWAL OF ARTERIAL BLOOD: CPT

## 2025-05-05 PROCEDURE — 99900035 HC TECH TIME PER 15 MIN (STAT)

## 2025-05-05 PROCEDURE — 27000207 HC ISOLATION

## 2025-05-05 PROCEDURE — 80053 COMPREHEN METABOLIC PANEL: CPT

## 2025-05-05 PROCEDURE — 83605 ASSAY OF LACTIC ACID: CPT

## 2025-05-05 PROCEDURE — 82803 BLOOD GASES ANY COMBINATION: CPT

## 2025-05-05 PROCEDURE — 94761 N-INVAS EAR/PLS OXIMETRY MLT: CPT

## 2025-05-05 PROCEDURE — 36415 COLL VENOUS BLD VENIPUNCTURE: CPT

## 2025-05-05 PROCEDURE — 83735 ASSAY OF MAGNESIUM: CPT

## 2025-05-05 PROCEDURE — 85025 COMPLETE CBC W/AUTO DIFF WBC: CPT

## 2025-05-05 PROCEDURE — 94640 AIRWAY INHALATION TREATMENT: CPT

## 2025-05-05 PROCEDURE — 25000003 PHARM REV CODE 250

## 2025-05-05 PROCEDURE — 27000221 HC OXYGEN, UP TO 24 HOURS

## 2025-05-05 PROCEDURE — 20000000 HC ICU ROOM

## 2025-05-05 PROCEDURE — 84484 ASSAY OF TROPONIN QUANT: CPT

## 2025-05-05 PROCEDURE — 25000242 PHARM REV CODE 250 ALT 637 W/ HCPCS

## 2025-05-05 RX ORDER — QUETIAPINE FUMARATE 25 MG/1
50 TABLET, FILM COATED ORAL NIGHTLY
Status: DISCONTINUED | OUTPATIENT
Start: 2025-05-05 | End: 2025-05-06

## 2025-05-05 RX ORDER — LORAZEPAM 0.5 MG/1
0.5 TABLET ORAL EVERY 8 HOURS PRN
Status: DISCONTINUED | OUTPATIENT
Start: 2025-05-05 | End: 2025-05-07

## 2025-05-05 RX ORDER — METOLAZONE 5 MG/1
5 TABLET ORAL ONCE
Status: COMPLETED | OUTPATIENT
Start: 2025-05-05 | End: 2025-05-05

## 2025-05-05 RX ORDER — BUMETANIDE 0.25 MG/ML
2 INJECTION, SOLUTION INTRAMUSCULAR; INTRAVENOUS
Status: DISCONTINUED | OUTPATIENT
Start: 2025-05-05 | End: 2025-05-05

## 2025-05-05 RX ORDER — BUMETANIDE 0.25 MG/ML
2 INJECTION, SOLUTION INTRAMUSCULAR; INTRAVENOUS 2 TIMES DAILY
Status: DISCONTINUED | OUTPATIENT
Start: 2025-05-05 | End: 2025-05-05

## 2025-05-05 RX ORDER — LORAZEPAM 2 MG/ML
0.5 INJECTION INTRAMUSCULAR ONCE
Status: COMPLETED | OUTPATIENT
Start: 2025-05-05 | End: 2025-05-05

## 2025-05-05 RX ORDER — BUMETANIDE 0.25 MG/ML
2 INJECTION, SOLUTION INTRAMUSCULAR; INTRAVENOUS DAILY
Status: DISCONTINUED | OUTPATIENT
Start: 2025-05-05 | End: 2025-05-05

## 2025-05-05 RX ORDER — BUMETANIDE 0.25 MG/ML
3 INJECTION, SOLUTION INTRAMUSCULAR; INTRAVENOUS ONCE
Status: COMPLETED | OUTPATIENT
Start: 2025-05-05 | End: 2025-05-05

## 2025-05-05 RX ORDER — HYDROXYZINE PAMOATE 25 MG/1
25 CAPSULE ORAL ONCE
Status: COMPLETED | OUTPATIENT
Start: 2025-05-05 | End: 2025-05-05

## 2025-05-05 RX ADMIN — PANTOPRAZOLE SODIUM 40 MG: 40 TABLET, DELAYED RELEASE ORAL at 08:05

## 2025-05-05 RX ADMIN — BUMETANIDE 1 MG/HR: 0.25 INJECTION INTRAMUSCULAR; INTRAVENOUS at 03:05

## 2025-05-05 RX ADMIN — HYDROCORTISONE SODIUM SUCCINATE 50 MG: 100 INJECTION, POWDER, FOR SOLUTION INTRAMUSCULAR; INTRAVENOUS at 11:05

## 2025-05-05 RX ADMIN — HYDROCORTISONE SODIUM SUCCINATE 50 MG: 100 INJECTION, POWDER, FOR SOLUTION INTRAMUSCULAR; INTRAVENOUS at 05:05

## 2025-05-05 RX ADMIN — IPRATROPIUM BROMIDE AND ALBUTEROL SULFATE 3 ML: .5; 3 SOLUTION RESPIRATORY (INHALATION) at 05:05

## 2025-05-05 RX ADMIN — Medication 1000 UNITS: at 08:05

## 2025-05-05 RX ADMIN — ATORVASTATIN CALCIUM 80 MG: 40 TABLET, FILM COATED ORAL at 08:05

## 2025-05-05 RX ADMIN — MIDODRINE HYDROCHLORIDE 5 MG: 5 TABLET ORAL at 08:05

## 2025-05-05 RX ADMIN — FOLIC ACID 1 MG: 1 TABLET ORAL at 08:05

## 2025-05-05 RX ADMIN — INSULIN ASPART 2 UNITS: 100 INJECTION, SOLUTION INTRAVENOUS; SUBCUTANEOUS at 06:05

## 2025-05-05 RX ADMIN — LORAZEPAM 0.5 MG: 2 INJECTION INTRAMUSCULAR; INTRAVENOUS at 09:05

## 2025-05-05 RX ADMIN — BUMETANIDE 3 MG: 0.25 INJECTION, SOLUTION INTRAMUSCULAR; INTRAVENOUS at 03:05

## 2025-05-05 RX ADMIN — IPRATROPIUM BROMIDE AND ALBUTEROL SULFATE 3 ML: .5; 3 SOLUTION RESPIRATORY (INHALATION) at 07:05

## 2025-05-05 RX ADMIN — SODIUM CHLORIDE 125 MG: 9 INJECTION, SOLUTION INTRAVENOUS at 11:05

## 2025-05-05 RX ADMIN — INSULIN ASPART 1 UNITS: 100 INJECTION, SOLUTION INTRAVENOUS; SUBCUTANEOUS at 08:05

## 2025-05-05 RX ADMIN — METOLAZONE 5 MG: 5 TABLET ORAL at 08:05

## 2025-05-05 RX ADMIN — HYDROXYZINE PAMOATE 25 MG: 25 CAPSULE ORAL at 05:05

## 2025-05-05 RX ADMIN — MICONAZOLE NITRATE: 20 POWDER TOPICAL at 08:05

## 2025-05-05 RX ADMIN — IPRATROPIUM BROMIDE AND ALBUTEROL SULFATE 3 ML: .5; 3 SOLUTION RESPIRATORY (INHALATION) at 12:05

## 2025-05-05 RX ADMIN — MICONAZOLE NITRATE: 20 POWDER TOPICAL at 09:05

## 2025-05-05 RX ADMIN — CLOPIDOGREL BISULFATE 75 MG: 75 TABLET, FILM COATED ORAL at 08:05

## 2025-05-05 RX ADMIN — RIVAROXABAN 20 MG: 10 TABLET, FILM COATED ORAL at 05:05

## 2025-05-05 RX ADMIN — QUETIAPINE FUMARATE 50 MG: 25 TABLET ORAL at 08:05

## 2025-05-05 RX ADMIN — BUMETANIDE 2 MG: 0.25 INJECTION INTRAMUSCULAR; INTRAVENOUS at 08:05

## 2025-05-05 NOTE — PLAN OF CARE
Problem: Skin Injury Risk Increased  Goal: Skin Health and Integrity  Outcome: Progressing     Problem: Adult Inpatient Plan of Care  Goal: Plan of Care Review  Outcome: Progressing  Goal: Patient-Specific Goal (Individualized)  Outcome: Progressing  Goal: Absence of Hospital-Acquired Illness or Injury  Outcome: Progressing  Goal: Optimal Comfort and Wellbeing  Outcome: Progressing  Goal: Readiness for Transition of Care  Outcome: Progressing     Problem: COPD (Chronic Obstructive Pulmonary Disease)  Goal: Optimal Chronic Illness Coping  Outcome: Progressing  Goal: Optimal Level of Functional Ha[place]on  Outcome: Progressing  Goal: Absence of Infection Signs and Symptoms  Outcome: Progressing  Goal: Improved Oral Intake  Outcome: Progressing     Problem: Heart Failure  Goal: Optimal Coping  Outcome: Progressing  Goal: Optimal Cardiac Output  Outcome: Progressing  Goal: Stable Heart Rate and Rhythm  Outcome: Progressing  Goal: Optimal Functional Ability  Outcome: Progressing  Goal: Fluid and Electrolyte Balance  Outcome: Progressing  Goal: Improved Oral Intake  Outcome: Progressing     Problem: Fall Injury Risk  Goal: Absence of Fall and Fall-Related Injury  Outcome: Progressing     Problem: Wound  Goal: Optimal Coping  Outcome: Progressing  Goal: Optimal Functional Ability  Outcome: Progressing  Goal: Absence of Infection Signs and Symptoms  Outcome: Progressing  Goal: Improved Oral Intake  Outcome: Progressing  Goal: Optimal Pain Control and Function  Outcome: Progressing  Goal: Skin Health and Integrity  Outcome: Progressing  Goal: Optimal Wound Healing  Outcome: Progressing     Problem: Acute Kidney Injury/Impairment  Goal: Fluid and Electrolyte Balance  Outcome: Progressing  Goal: Improved Oral Intake  Outcome: Progressing  Goal: Effective Renal Function  Outcome: Progressing     Problem: Dysrhythmia  Goal: Normalized Cardiac Rhythm  Outcome: Progressing     Problem: Infection  Goal: Absence of Infection  Signs and Symptoms  Outcome: Progressing

## 2025-05-05 NOTE — PROGRESS NOTES
Inpatient Nutrition Assessment    Admit Date: 4/21/2025   Total duration of encounter: 14 days   Patient Age: 69 y.o.    Nutrition Recommendation/Prescription     Continue heart healthy/ 1.5 L fluid restriction; pt requesting extra gravy on food  continue boost plus -1 carton daily; Boost Plus (provides 360 kcal, 14 g protein per serving)   Electrolyte replacement as needed   MVI/fe  Biweekly wt  Pt education on diet complete  Will monitor nutrition status     Communication of Recommendations: reviewed with nurse and reviewed with patient    Nutrition Assessment     Malnutrition Assessment/Nutrition-Focused Physical Exam    Malnutrition Context: chronic illness (04/22/25 1459)  Malnutrition Level: moderate (04/22/25 1459)  Energy Intake (Malnutrition): less than 75% for greater than 7 days (04/22/25 1459)  Weight Loss (Malnutrition): other (see comments) (Does not meet criteria) (04/22/25 1459)  Subcutaneous Fat (Malnutrition): mild depletion (04/22/25 1459)  Orbital Region (Subcutaneous Fat Loss): mild depletion  Upper Arm Region (Subcutaneous Fat Loss): mild depletion     Muscle Mass (Malnutrition): mild depletion (04/22/25 1459)     Clavicle Bone Region (Muscle Loss): mild depletion         Fluid Accumulation (Malnutrition): mild (04/22/25 1459)     Hand  Strength, Right (Malnutrition): Unable to assess (04/22/25 1459)  A minimum of two characteristics is recommended for diagnosis of either severe or non-severe malnutrition.    Chart Review    Reason Seen: continuous nutrition monitoring and follow-up    Malnutrition Screening Tool Results   Have you recently lost weight without trying?: No  Have you been eating poorly because of a decreased appetite?: No   MST Score: 0   Diagnosis:  Afib, CAD, heart failure, pulmonary HTN, volume overload, hyponatremia, ELIZABETH, anion gap acidemia, abnormal UA, PVD, anemia, COPD, glaucoma ; cardiogenic shock , sepsis    Relevant Medical History: tobacco use, COPD, HTN, HLD,  Afib, heart failure, PVD, fourniers gangrene    Scheduled Medications:  atorvastatin, 80 mg, Daily  bumetanide, 2 mg, Q12H  clopidogreL, 75 mg, Daily  ferric gluconate (Ferrlecit) IVPB, 125 mg, Once  folic acid, 1 mg, Daily  hydrocortisone sodium succinate, 50 mg, Q6H  miconazole NITRATE 2 %, , BID  pantoprazole, 40 mg, BID  QUEtiapine, 50 mg, QHS  rivaroxaban, 20 mg, Daily  vitamin D, 1,000 Units, Daily    Continuous Infusions:  0.9% NaCl, Last Rate: Stopped (05/04/25 2254)  DOBUTamine IV infusion (non-titrating), Last Rate: 5 mcg/kg/min (05/05/25 0800)    PRN Medications:  acetaminophen, 650 mg, Q4H PRN  albuterol-ipratropium, 3 mL, Q6H PRN  aluminum-magnesium hydroxide, 30 mL, BID PRN  dextrose 50%, 12.5 g, PRN  dextrose 50%, 25 g, PRN  glucagon (human recombinant), 1 mg, PRN  glucose, 16 g, PRN  glucose, 24 g, PRN  hydrALAZINE, 10 mg, Q4H PRN  hydrocortisone, , TID PRN  hydrOXYzine pamoate, 25 mg, Q6H PRN  insulin aspart U-100, 0-5 Units, QID (AC + HS) PRN  labetalol, 10 mg, Q4H PRN  LORazepam, 2 mg, Q4H PRN  melatonin, 6 mg, Nightly PRN  naloxone, 0.02 mg, PRN  nicotine, 1 patch, Daily PRN  polyethylene glycol, 17 g, Daily PRN  prochlorperazine, 5 mg, Q6H PRN  senna-docusate, 1 tablet, BID PRN  simethicone, 1 tablet, TID PRN  sodium chloride 0.9%, 10 mL, PRN  traZODone, 25 mg, Nightly PRN    Calorie Containing IV Medications: no significant kcals from medications at this time    Recent Labs   Lab 04/29/25  0309 04/30/25  0539 05/01/25  0411 05/02/25  0556 05/03/25  0542 05/03/25  1230 05/03/25  1558 05/03/25 1955 05/04/25  0000 05/04/25  0215 05/04/25  1409 05/05/25  0206    138 137 135* 134* 133* 132* 131* 132* 131* 131* 131*   K 3.9 3.8 3.1* 3.8 4.3 4.4 4.4 4.0 4.3 4.0 4.2 4.3   CALCIUM 9.4 9.9 9.7 9.8 9.9 10.0 10.0 9.5 9.5 9.4 9.2 9.1   PHOS 2.9 3.0 2.9 3.2 4.0  --   --   --   --  3.6  --  3.4   MG 2.00 1.70 1.80 2.00 2.10  --   --   --   --  2.40  --  2.30   CL 97* 98 98 97* 95* 94* 93* 94* 93* 93*  93* 92*   CO2 28 28 25 19* 16* 16* 18* 21* 23 21* 20* 23   BUN 19.0 17.8 19.2 24.1 36.3* 42.5* 45.4* 44.9* 46.5* 46.5* 48.4* 51.9*   CREATININE 0.89 0.97 0.92 1.28* 1.91* 2.08* 2.17* 2.00* 2.03* 1.97* 1.81* 1.84*   EGFRNORACEVR >60 >60 >60 >60 38 34 33 36 35 37 40 40   BILITOT 3.4* 3.8* 4.1* 4.9* 5.4*  --   --   --   --  5.0*  --  4.7*   ALKPHOS 169* 184* 177* 186* 213*  --   --   --   --  231*  --  220*   ALT 20 20 19 17 18  --   --   --   --  20  --  25   AST 41 42 35 36 36  --   --   --   --  45  --  62*   ALBUMIN 3.1* 3.3* 3.3* 3.4 3.4  --   --   --   --  3.4  --  3.4   WBC 5.54 6.62 5.50 6.52 7.41  --   --   --   --  8.47  --  11.14   HGB 8.9* 8.9* 8.5* 8.7* 8.2*  --   --   --   --  7.7*  --  7.8*   HCT 27.7* 27.8* 26.6* 27.1* 25.8*  --   --   --   --  23.6*  --  24.2*     Nutrition Orders:  Diet Heart Healthy Fluid - 1500mL; Standard Tray  Dietary nutrition supplements Daily; Boost Plus Nutritional Drink - Rich Chocolate    Appetite/Oral Intake: fair/50-75% of meals  Factors Affecting Nutritional Intake: decreased appetite and shortness of breath  Social Needs Impacting Access to Food: none identified  Food/[employer]/Cultural Preferences: none reported  Food Allergies: none reported  Last Bowel Movement: 05/04/25  Wound(s):  none    Comments  (5/5) Pt sitting in chair; reported appetite up/down; not liking food; encouraged pt to select food options with nutrition ; pt eating approx 50% meals but does drink ONS. Wt stable 91.5kg (5-3). Labs acknowledged: Gluc (H)--no hx DM; LFTs remain elevated. H/H (L)--on Fe supplement. + LE edema; on diuretic. Current diet/ONS appropriate.     (5/1) Pt reported he ate well for breakfast; depends what is on menu; appetite fair/good; drinking ONS; wt fluctuates with fluid; noted low K--on electrolyte repletion. Tbili remains elevated. Current diet tx appropriate.    (4/29) Pt sitting in chair during rounds ; eating peanut butter cookies; reported not liking food; wants  gravy on meat; will honor request; ate eggs and sausage for breakfast; fair po intake; pt is drinking ONS. No new wt--in chair/unable to weigh. H/h(L)--consider fe : Tbili remains elevated. Pt remains on levophed.     () Pt tolerating oral diet; po intake remains fair; encouraged ONS; noted low K/Mg--suggest electrolyte repletion. Pt remains on levophed/pressor support. Tbili--elevated. Pt wt 196#; diuresis.     () Pt reported fair po intake; eating 50-75% meals; + LE edema; mild fat/muscle wasting; wt fluctuates with fluid; on diuretic; UBW between 205-225#. Pt willing to drink ONS; will order once daily --need to monitor fluid volume. Pt education complete on diet tx.     Anthropometrics    Height: 6' (182.9 cm), Height Method: Stated  Last Weight: 91.5 kg (201 lb 12.8 oz) (25 06), Weight Method: Standard Scale  BMI (Calculated): 27.4  BMI Classification: overweight (BMI 25-29.9)        Ideal Body Weight (IBW), Male: 178 lb     % Ideal Body Weight, Male (lb): 109.37 %                 Usual Body Weight (UBW), k kg (wt fluctuates 205-225#; fluid)  % Usual Body Weight: 100     Usual Weight Provided By: patient and EMR weight history    Wt Readings from Last 5 Encounters:   25 91.5 kg (201 lb 12.8 oz)   25 102.1 kg (225 lb)   25 101.2 kg (223 lb)   25 102.5 kg (225 lb 14.4 oz)   02/15/25 103.6 kg (228 lb 6.3 oz)     Weight Change(s) Since Admission: -225#  Wt Readings from Last 1 Encounters:   25 0600 91.5 kg (201 lb 12.8 oz)   25 0700 90.3 kg (199 lb 1.2 oz)   25 0600 90.3 kg (199 lb 1.2 oz)   25 1030 88.3 kg (194 lb 10.7 oz)   25 0547 88.3 kg (194 lb 10.7 oz)   25 0600 89.1 kg (196 lb 6.9 oz)   25 0555 89.1 kg (196 lb 6.9 oz)   25 0502 87.8 kg (193 lb 9 oz)   25 0610 92.8 kg (204 lb 9.6 oz)   25 1709 96.6 kg (213 lb)   25 1137 97.5 kg (215 lb)   Admit Weight: 97.5 kg (215 lb) (25 1137), Weight  Method: Stated    Estimated Needs    Weight Used For Calorie Calculations: 92.8 kg (204 lb 9.4 oz)  Energy Calorie Requirements (kcal): 2320 kcal/d; 25 tyron/kg  Energy Need Method: Kcal/kg  Weight Used For Protein Calculations: 92.8 kg (204 lb 9.4 oz)  Protein Requirements: 93 gm protein/d; 1 gm/kg  Fluid Requirements (mL): 2320 ml/d; 1ml/tyron        Enteral Nutrition     Patient not receiving enteral nutrition at this time.    Parenteral Nutrition     Patient not receiving parenteral nutrition support at this time.    Evaluation of Received Nutrient Intake    Calories: not meeting estimated needs; (5/5) po intake fair   Protein: not meeting estimated needs    Patient Education     Education Provided: heart healthy diet  Teaching Method: explanation and printed materials  Comprehension: verbalizes understanding  Barriers to Learning: none evident  Expected Compliance: fair  Comments: All questions were answered and dietitian's contact information was provided.     Nutrition Diagnosis     PES: Inadequate oral intake related to chronic illness as evidenced by eating 75% or less meals . (active)     PES: Moderate chronic disease or condition related malnutrition Related to chronic illness  As Evidenced by:  - energy intake: < 75% for 3 weeks (meets criteria for < 75% for > 7 days (moderate - acute)) - muscle mass depletion: 2 areas of mild muscle loss (Trapezius, Clavicle) - loss of subcutaneous fat: 3 areas of mild fat loss (Buccal, Triceps Skinfold, Infraorbital) - fluid accumulation: 1 area of mild fluid accumulation (Lower extremities edema) active    Nutrition Interventions     Intervention(s): modified composition of meals/snacks, multivitamin/mineral supplement therapy, purpose of nutrition education, and collaboration with other providers  Intervention(s): Oral nutritional supplement;Nutrition education;Care coordination or referral;Oral diet/nutrient modifications    Goal: Meet greater than 80% of nutritional  needs by follow-up. (goal progressing)  Goal: Maintain weight throughout hospitalization. (goal progressing)    Nutrition Goals & Monitoring     Dietitian will monitor: food and beverage intake, weight, and food/nutrition knowledge skill  Discharge planning: continue heart healthy diet  Nutrition Risk/Follow-Up: patient at increased nutrition risk; dietitian will follow-up twice weekly   Please consult if re-assessment needed sooner.

## 2025-05-05 NOTE — PROGRESS NOTES
[facility] - [address] [place] [address] Telemetry  Pulmonary Critical Care Note    Patient Name: Ran Reyes Jr.  MRN: [id number]  Admission Date: 4/21/2025  Hospital Length of Stay: 13 days  Code Status: Full Code  Attending Provider: Trisha German MD  Primary Care Provider: Abiodun Recinos DO     Subjective:     HPI:   Ran Reyes Jr. is a 69 y.o. 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 ED (4/21/2025) due to generalized fatigue and weakness x 3-5 days. Patient reports he can only ambulate about 20-30 feet before having to stop due to claudication pain which is chronic and unchanged compared to baseline. Since running out of his diuretic medications approximately 5 days ago he has noted progressively worsening lower extremity swelling causing leg heaviness and weakness exacerbating difficulty ambulating.  He also notes acute on chronic cough productive of a brownish sputum without hemoptysis.  Denies fever, chills, sweats.  Denies chest pain.  Endorses shortness of breath at rest and with ambulation but unchanged compared to baseline.  Denies abdominal pain, nausea, vomiting, constipation, diarrhea.  Denies increased or decreased urinary frequency, dysuria, or hematuria.  Sleeps with 2 pillows at night due to baseline orthopnea. Has not had to increase number of pillows or change sleeping habits. Wears 2L NC home oxygen at baseline for hx of COPD. Denies having increased oxygen requirements prior to ED presentation.     ED course:  Vital signs stable.  Oxygenation  stable on 2 L nasal cannula.  CBC shows microcytic anemic (HGB 9.0; MCV 77).  CMP shows hyponatremia (Na 128), acidemia (CO2 14), elevated renal indices (BUN 25; creatinine 1.93), elevated alkaline phosphatase (). Troponin WNL.  BNP 26 39.5.  UDS negative.  EKG showed atrial fibrillation with PVCs.  Chest x-ray difficult to interpret due to overlying medical devices however appears to show cardiomegaly with bilateral pulmonary vascular congestion with question of bilateral pleural effusions. He was admitted for acute CHF exacerbation and cardiorenal syndrome.        Hospital Course/Significant events:  4/23/25: Admit to ICU, started on pressors and inotropes for cardiogenic / septic shock   5/2/25: Downgraded to    5/3/25: Re-upgraded to ICU 2/2 worsening lactate and agonal breathing     24 Hour Interval History:  Patient had episodes of NSVT overnight. He c/o shortness of breath. Nurse reports patient with increased anxiety and lack of sleep. Patient c/o chest pain about 4-5/10 but no radiation. Denies abdominal pain, nausea, vomiting, headaches. Endorses lack of appetite but due to taste but then again states he is hungry. Remains on Dobutamine infusion 5 mcg/min. Lactate noted to up trend from 3.4 now at 4. Received one dose of IV Bumex 1 mg at 2341. Troponin ordered and mild up trend noted 0.049 --> 0.055. UOP of 1.2 L reported but nurse reports some bed wetting as well. CBC stable from prior. CMP with hyponatremia 131, renal indices gradually improving from prior. Mild improvement of T bili to 4.7 as well.   Discussed code status and patient wants Full Code. Verbalized he wants his Niece Tiffanie Olsen to be decision maker.     Past Medical History:   Diagnosis Date    A-fib     Anticoagulant long-term use     Anxiety     Aortic aneurysm     Cataract     CHF (congestive heart failure)     Chronic atrial fibrillation     COPD (chronic obstructive pulmonary disease)     Coronary artery disease      Depression     HLD (hyperlipidemia)     Hypertension     Mitral regurgitation     PAD (peripheral artery disease)     Primary open angle glaucoma (POAG)        Past Surgical History:   Procedure Laterality Date    ANGIOGRAM, CORONARY, WITH LEFT HEART CATHETERIZATION N/A 03/26/2024    Procedure: Angiogram, Coronary, with Left Heart Cath;  Surgeon: Adriano Montiel MD;  Location: [facility] CATH LAB;  Service: Cardiology;  Laterality: N/A;    ATHERECTOMY OF PERIPHERAL VESSEL Left 09/12/2022    LEFT SFA ATHERECTOMY, BALLOON ANGIOPLASTY    CATARACT EXTRACTION W/  INTRAOCULAR LENS IMPLANT Left 03/09/2023    Procedure: EXTRACTION, CATARACT, WITH IOL INSERTION;  Surgeon: Georgi Borja MD;  Location: [facility];  Service: Ophthalmology;  Laterality: Left;  19.5  mac    EGD, WITH CLOSED BIOPSY  03/22/2024    Procedure: EGD, WITH CLOSED BIOPSY;  Surgeon: Ronald Calderon MD;  Location: [facility] ENDOSCOPY;  Service: Gastroenterology;;    ESOPHAGOGASTRODUODENOSCOPY N/A 03/22/2024    Procedure: EGD;  Surgeon: Ronald Calderon MD;  Location: [facility] ENDOSCOPY;  Service: Gastroenterology;  Laterality: N/A;    Heart Stent N/A     > 10yrs. AGO    INCISION AND DRAINAGE N/A 03/18/2024    Procedure: Incision and Drainage;  Surgeon: Fahad Rivas MD;  Location: [facility];  Service: Urology;  Laterality: N/A;  I&D SCROTAL ABSCESS    INSERTION OF STENT INTO PERIPHERAL VESSEL Right 10/17/2022    RIGHT SFA ATHERECTOMY, BALLOON ANGIOPLASTY, STENT; RIGHT ANTERIOR TIBIAL ARTERY ATHERECTOMY, BALLOON ANGIOPLASTY    ORCHIECTOMY Left 03/18/2024    Procedure: ORCHIECTOMY;  Surgeon: Fahad Rivas MD;  Location: [facility];  Service: Urology;  Laterality: Left;       Social History[1]    Current Outpatient Medications   Medication Instructions    acetaminophen 650 mg, 2 times daily PRN    ALBUTEROL INHL 2 puffs, Every 6 hours PRN    albuterol-ipratropium (DUO-NEB) 2.5 mg-0.5 mg/3 mL nebulizer solution 3 mLs, Nebulization, Every 6  hours PRN, Rescue    aspirin (ECOTRIN) 81 MG EC tablet 1 tablet, Daily    atorvastatin (LIPITOR) 80 mg, Oral, Daily    BREZTRI AEROSPHERE 160-9-4.8 mcg/actuation HFAA 2 puffs, 2 times daily    cetirizine (ZYRTEC) 10 mg, Oral, Daily    clopidogreL (PLAVIX) 75 mg, Oral, Daily    folic acid (FOLVITE) 1 mg, Oral, Daily    furosemide (LASIX) 40 mg, Oral, 2 times daily    gabapentin (NEURONTIN) 300 mg, Oral, 3 times daily    metoprolol succinate (TOPROL-XL) 12.5 mg, Oral, Daily    nicotine (NICODERM CQ) 14 mg/24 hr 1 patch, Transdermal, Daily PRN    nicotine (NICODERM CQ) 21 mg/24 hr 1 patch, Transdermal, Daily    nitrofurantoin, macrocrystal-monohydrate, (MACROBID) 100 MG capsule 100 mg, Oral, 2 times daily    olopatadine (PATANOL) 0.1 % ophthalmic solution Apply to eye.    pantoprazole (PROTONIX) 40 mg, Oral, 2 times daily    potassium chloride SA (K-DUR,KLOR-CON) 20 MEQ tablet 20 mEq, Oral, 2 times daily    rivaroxaban (XARELTO) 20 mg, Oral, Daily    sacubitriL-valsartan (ENTRESTO) 49-51 mg per tablet 1 tablet, Daily    sertraline (ZOLOFT) 100 mg, Oral, Daily    urea (CARMOL) 40 % Crea Apply topically.    varenicline tartrate (CHANTIX) 0.5 mg    vitamin D (VITAMIN D3) 1,000 Units, Daily     Current Inpatient Medications   atorvastatin  80 mg Oral Daily    bumetanide  2 mg Intravenous BID    clopidogreL  75 mg Oral Daily    folic acid  1 mg Oral Daily    hydrocortisone sodium succinate  50 mg Intravenous Q6H    miconazole NITRATE 2 %   Topical (Top) BID    midodrine  5 mg Oral TID WM    pantoprazole  40 mg Oral BID    QUEtiapine  50 mg Oral QHS    rivaroxaban  20 mg Oral Daily    vitamin D  1,000 Units Oral Daily     Current Intravenous Infusions   0.9% NaCl   Intravenous Continuous   Stopped at 05/04/25 4774    DOBUTamine IV infusion (non-titrating)  5 mcg/kg/min Intravenous Continuous 6.9 mL/hr at 05/05/25 0547 5 mcg/kg/min at 05/05/25 0547     ROS: neg unless stated above        Objective:     Intake/Output Summary  (Last 24 hours) at 5/5/2025 0706  Last data filed at 5/5/2025 0547  Gross per 24 hour   Intake 1018.75 ml   Output 1200 ml   Net -181.25 ml     Vital Signs (Most Recent):  Temp: 98 °F (36.7 °C) (05/05/25 0400)  Pulse: 98 (05/05/25 0542)  Resp: (!) 26 (05/05/25 0542)  BP: 115/85 (05/05/25 0535)  SpO2: 99 % (05/05/25 0542)  Body mass index is 27.37 kg/m².  Weight: 91.5 kg (201 lb 12.8 oz) Vital Signs (24h Range):  Temp:  [97.3 °F (36.3 °C)-98.4 °F (36.9 °C)] 98 °F (36.7 °C)  Pulse:  [] 98  Resp:  [13-32] 26  SpO2:  [94 %-100 %] 99 %  BP: (107-123)/(74-94) 115/85     Physical Exam:  General: Mild distress, on 4L supplemental oxygen  HEENT: NC/AT; PERRL; scleral icterus, nasal and oral mucosa moist and clear  Pulm: + rales bibasilar and expiratory wheezing noted.  CV: S1, S2 irregular irregular w/o murmurs or gallops; JVD present with +HJR, 1-2+ edema noted of BLE  GI: Bowel sound present in all quadrants, abdomen distended with pain on deep palpation around lower quadrant  MSK: Full ROM of all extremities and spine w/o limitation or discomfort  Derm: No rashes, abnormal bruising, or skin lesions  Neuro: AAOx4; motor/sensory function intact  Psych: Anxious    Lines/Drains/Airways       Peripherally Inserted Central Catheter Line  Duration             PICC Triple Lumen 04/24/25 0942 right brachial 10 days              Peripheral Intravenous Line  Duration                  Peripheral IV - Single Lumen 04/22/25 0000 20 G Left;Posterior Forearm 13 days                  Significant Labs:  Lab Results   Component Value Date    WBC 11.14 05/05/2025    HGB 7.8 (L) 05/05/2025    HCT 24.2 (L) 05/05/2025    MCV 75.9 (L) 05/05/2025     05/05/2025       BMP  Lab Results   Component Value Date     (L) 05/05/2025    K 4.3 05/05/2025    CO2 23 05/05/2025    BUN 51.9 (H) 05/05/2025    CREATININE 1.84 (H) 05/05/2025    CALCIUM 9.1 05/05/2025    AGAP 16.0 05/05/2025    EGFRNONAA 88 (L) 12/06/2021     ABG  Recent Labs    Lab 05/03/25  1629   PH 7.360   PO2 45.0*   PCO2 38.0*   HCO3 21.5     Mechanical Ventilation Support:  Oxygen Concentration (%): 36 (05/04/25 1917)    Significant Imaging:  I have reviewed the pertinent imaging within the past 24 hours.    Assessment/Plan:     Assessment  Cardiogenic Shock  Lactic Acidosis   HFrEF (22%, G3DD), Pulmonary Hypertension  NOCAD, NICMO  NSTEMI likely demand  Chronic persistent Afib  Hx of VT arrest (has life vest)  ELIZABETH   Adrenal Insufficiency   Low cortisol on ata stim test and normal plast ACTH level  COPD   Microcytic Anemia   Low TSAT 7%  Prolonged QTC  Groin itch  Anxiety/Insomnia     Plan  - Continue ongoing ICU level of care  - Continue Dobutamine 5 mcg/min and IV Hydrocortisone 50 q6hr  - Continue Midodrine 5 mg TID  - Discussed with cardiology fellow; formal note forthcoming   Will start Bumex IV BID  - Dobbs ordered or accurate I&O  - Trend lactic acid q2h   - Trend troponin every 6 hours  - VBG ordered  - Discontinue ASA, Continue plavix and statin  - Continue Xarelto 20 mg daily  - Hold beta blocker until patient euvolemic again  - Avoid nephrotoxins   - has prn duonebs q6 prn scheduled  - Strict I&O and daily weights  - LDSSI added today  - Continue Miconazole powder BID   - Trazodone and Vistaril did not help. will start Seroquel 50 mg qhs     CODE STATUS: Full Code  Access: PICC and PIV  Antibiotics: none  Diet: Cardiac  DVT Prophylaxis: Xarelto 20 mg  GI Prophylaxis: PPI  Fluids: None     32 minutes of critical care was time spent personally by me on the following activities: development of treatment plan with patient or surrogate and bedside caregivers, discussions with consultants, evaluation of patient's response to treatment, examination of patient, ordering and performing treatments and interventions, ordering and review of laboratory studies, ordering and review of radiographic studies, pulse oximetry, re-evaluation of patient's condition.  This critical care time  did not overlap with that of any other provider or involve time for any procedures.     Valerio Shrestha MD PGY-2  Pulmonary Critical Care Medicine  [facility] [address] [facility] Telemetry         [1]   Social History  Socioeconomic History    Marital status: Single   Tobacco Use    Smoking status: Every Day     Current packs/day: 0.50     Average packs/day: 0.8 packs/day for 50.3 years (40.9 ttl pk-yrs)     Types: Cigarettes     Start date: 1975     Passive exposure: Current    Smokeless tobacco: Never    Tobacco comments:     States smoke 2-3 cigarettes per day   Substance and Sexual Activity    Alcohol use: Yes     Alcohol/week: 3.0 standard drinks of alcohol     Types: 3 Cans of beer per week     Comment: socially    Drug use: Not Currently     Frequency: 1.0 times per week     Types: Marijuana     Comment: no use in over 1 year    Sexual activity: Not Currently     Partners: Female     Social Drivers of Health     Financial Resource Strain: Low Risk  (4/23/2025)    Overall Financial Resource Strain (CARDIA)     Difficulty of Paying Living Expenses: Not hard at all   Food Insecurity: No Food Insecurity (4/23/2025)    Hunger Vital Sign     Worried About Running Out of Food in the Last Year: Never true     Ran Out of Food in the Last Year: Never true   Transportation Needs: No Transportation Needs (4/23/2025)    PRAPARE - Transportation     Lack of Transportation (Medical): No     Lack of Transportation (Non-Medical): No   Physical Activity: Inactive (12/17/2024)    Exercise Vital Sign     Days of Exercise per Week: 0 days     Minutes of Exercise per Session: 0 min   Stress: No Stress Concern Present (4/23/2025)    [employer] [place] [employer] - Occupational Stress Questionnaire     Feeling of Stress : Not at all   Housing Stability: Low Risk  (4/23/2025)    Housing Stability Vital Sign     Unable to Pay for Housing in the Last Year: No     Homeless in the Last Year: No

## 2025-05-05 NOTE — PROGRESS NOTES
Cardiology Progress Note     Cardiology Attending: Dayanna Johnson MD  Cardiology Fellow: Vernon Cifuentes MD     Date of Admit: 4/21/2025  Date of Consult: 5/5/2025    History of Present Illness:      Ran Reyes Jr. is a 69 y.o. male with NICM-HFrEF (LVEF 20-25%; G3DD), HTN, non obstructive CAD, AF on OAC, PAD, HLD, and COPD who presented with acute on chronic decompensated heart failure now in cardiogenic shock.     The patient reports continued orthopnea, PND, abdominal distension, and lower extremity edema. He is on fixed dose Dobutamine 5 mcg/kg/min. Negative 181 cc over the last 24 hours but unmeasured urine output is reported. No weight since 5/3/25.      Past Medical History:     Past Medical History:   Diagnosis Date    A-fib     Anticoagulant long-term use     Anxiety     Aortic aneurysm     Cataract     CHF (congestive heart failure)     Chronic atrial fibrillation     COPD (chronic obstructive pulmonary disease)     Coronary artery disease     Depression     HLD (hyperlipidemia)     Hypertension     Mitral regurgitation     PAD (peripheral artery disease)     Primary open angle glaucoma (POAG)      Surgical History:     Past Surgical History:   Procedure Laterality Date    ANGIOGRAM, CORONARY, WITH LEFT HEART CATHETERIZATION N/A 03/26/2024    Procedure: Angiogram, Coronary, with Left Heart Cath;  Surgeon: Adriano Montiel MD;  Location: [facility] CATH LAB;  Service: Cardiology;  Laterality: N/A;    ATHERECTOMY OF PERIPHERAL VESSEL Left 09/12/2022    LEFT SFA ATHERECTOMY, BALLOON ANGIOPLASTY    CATARACT EXTRACTION W/  INTRAOCULAR LENS IMPLANT Left 03/09/2023    Procedure: EXTRACTION, CATARACT, WITH IOL INSERTION;  Surgeon: Georgi Borja MD;  Location: [facility];  Service: Ophthalmology;  Laterality: Left;  19.5  mac    EGD, WITH CLOSED BIOPSY  03/22/2024    Procedure: EGD, WITH CLOSED BIOPSY;  Surgeon: Ronald Calderon MD;  Location: [facility] ENDOSCOPY;  Service: Gastroenterology;;     ESOPHAGOGASTRODUODENOSCOPY N/A 03/22/2024    Procedure: EGD;  Surgeon: Ronald Calderon MD;  Location: [facility] ENDOSCOPY;  Service: Gastroenterology;  Laterality: N/A;    Heart Stent N/A     > 10yrs. AGO    INCISION AND DRAINAGE N/A 03/18/2024    Procedure: Incision and Drainage;  Surgeon: Fahad Rivas MD;  Location: [facility] OR;  Service: Urology;  Laterality: N/A;  I&D SCROTAL ABSCESS    INSERTION OF STENT INTO PERIPHERAL VESSEL Right 10/17/2022    RIGHT SFA ATHERECTOMY, BALLOON ANGIOPLASTY, STENT; RIGHT ANTERIOR TIBIAL ARTERY ATHERECTOMY, BALLOON ANGIOPLASTY    ORCHIECTOMY Left 03/18/2024    Procedure: ORCHIECTOMY;  Surgeon: Fahad Rivas MD;  Location: [facility];  Service: Urology;  Laterality: Left;     Allergies:   Review of patient's allergies indicates:  No Known Allergies  Family History:     Family History   Problem Relation Name Age of Onset    Cancer Mother      Hypertension Mother      Heart failure Father      Stroke Father      Hypertension Father      No Known Problems Sister       Social History:   Social History[1]  Review of Systems:     As above.   Medications:     Home Medications:  Prior to Admission medications    Medication Sig Start Date End Date Taking? Authorizing Provider   aspirin (ECOTRIN) 81 MG EC tablet Take 1 tablet by mouth once daily. 3/14/25  Yes Provider, Historical   olopatadine (PATANOL) 0.1 % ophthalmic solution Apply to eye. 2/7/25  Yes Provider, Historical   sacubitriL-valsartan (ENTRESTO) 49-51 mg per tablet Take 1 tablet by mouth once daily. 2/12/25  Yes Provider, Historical   urea (CARMOL) 40 % Crea Apply topically. 2/13/25  Yes Provider, Historical   varenicline tartrate (CHANTIX) 1 mg Tab Take 0.5 mg by mouth. 2/13/25  Yes Provider, Historical   acetaminophen 325 mg Cap Take 650 mg by mouth 2 (two) times daily as needed.    Provider, Historical   ALBUTEROL INHL Inhale 2 puffs into the lungs every 6 (six) hours as needed.    Provider, Historical    albuterol-ipratropium (DUO-NEB) 2.5 mg-0.5 mg/3 mL nebulizer solution Take 3 mLs by nebulization every 6 (six) hours as needed for Wheezing. Rescue 12/9/24   Diana Hassan MD   atorvastatin (LIPITOR) 80 MG tablet Take 1 tablet (80 mg total) by mouth once daily. 3/4/25 3/4/26  Marino Marquez DO   BREZTRI AEROSPHERE 160-9-4.8 mcg/actuation HFAA Inhale 2 puffs into the lungs 2 (two) times daily. 1/7/25   Provider, Historical   cetirizine (ZYRTEC) 10 MG tablet Take 1 tablet (10 mg total) by mouth once daily. 8/8/24 8/3/25  Abiodun Recinos DO   clopidogreL (PLAVIX) 75 mg tablet Take 1 tablet (75 mg total) by mouth once daily. 3/4/25   Marino Marquez DO   folic acid (FOLVITE) 1 MG tablet Take 1 tablet (1 mg total) by mouth once daily. 4/9/24 4/9/25  Tha Edmond MD   furosemide (LASIX) 40 MG tablet Take 1 tablet (40 mg total) by mouth 2 (two) times a day. 3/4/25   Marino Marquez DO   gabapentin (NEURONTIN) 300 MG capsule Take 300 mg by mouth 3 (three) times daily.    Provider, Historical   metoprolol succinate (TOPROL-XL) 25 MG 24 hr tablet Take 0.5 tablets (12.5 mg total) by mouth once daily. 3/4/25 3/4/26  Marino Marquez DO   nicotine (NICODERM CQ) 14 mg/24 hr Place 1 patch onto the skin daily as needed. 3/4/25   Marino Marquez DO   nicotine (NICODERM CQ) 21 mg/24 hr Place 1 patch onto the skin once daily. 3/20/25   Heaven Page MD   nitrofurantoin, macrocrystal-monohydrate, (MACROBID) 100 MG capsule Take 1 capsule (100 mg total) by mouth 2 (two) times daily. 3/4/25   Marino Marquez DO   pantoprazole (PROTONIX) 40 MG tablet Take 1 tablet (40 mg total) by mouth 2 (two) times a day. 9/23/24   Abiodun Recinos DO   potassium chloride SA (K-DUR,KLOR-CON) 20 MEQ tablet Take 1 tablet (20 mEq total) by mouth 2 (two) times daily. 1/28/25 1/28/26  Vernon Cifuentes MD   rivaroxaban (XARELTO) 20 mg Tab Take 1 tablet (20 mg total) by mouth Daily. 3/4/25   Marino Marquez DO   sertraline (ZOLOFT) 100 MG tablet Take 100 mg by  mouth once daily.    Provider, Historical   vitamin D (VITAMIN D3) 1000 units Tab Take 1,000 Units by mouth once daily.    Provider, Historical       Current/Inpatient Medications:  Infusions:   0.9% NaCl   Intravenous Continuous   Stopped at 05/04/25 2254    DOBUTamine IV infusion (non-titrating)  5 mcg/kg/min Intravenous Continuous 6.9 mL/hr at 05/05/25 0547 5 mcg/kg/min at 05/05/25 0547     Scheduled:   atorvastatin  80 mg Oral Daily    bumetanide  2 mg Intravenous Q12H    clopidogreL  75 mg Oral Daily    folic acid  1 mg Oral Daily    hydrocortisone sodium succinate  50 mg Intravenous Q6H    miconazole NITRATE 2 %   Topical (Top) BID    midodrine  5 mg Oral TID WM    pantoprazole  40 mg Oral BID    QUEtiapine  50 mg Oral QHS    rivaroxaban  20 mg Oral Daily    vitamin D  1,000 Units Oral Daily     PRN:    Current Facility-Administered Medications:     acetaminophen, 650 mg, Oral, Q4H PRN    albuterol-ipratropium, 3 mL, Nebulization, Q6H PRN    aluminum-magnesium hydroxide, 30 mL, Oral, BID PRN    dextrose 50%, 12.5 g, Intravenous, PRN    dextrose 50%, 25 g, Intravenous, PRN    glucagon (human recombinant), 1 mg, Intramuscular, PRN    glucose, 16 g, Oral, PRN    glucose, 24 g, Oral, PRN    hydrALAZINE, 10 mg, Intravenous, Q4H PRN    hydrocortisone, , Topical (Top), TID PRN    hydrOXYzine pamoate, 25 mg, Oral, Q6H PRN    insulin aspart U-100, 0-5 Units, Subcutaneous, QID (AC + HS) PRN    labetalol, 10 mg, Intravenous, Q4H PRN    LORazepam, 2 mg, Oral, Q4H PRN    melatonin, 6 mg, Oral, Nightly PRN    naloxone, 0.02 mg, Intravenous, PRN    nicotine, 1 patch, Transdermal, Daily PRN    polyethylene glycol, 17 g, Oral, Daily PRN    prochlorperazine, 5 mg, Intravenous, Q6H PRN    senna-docusate, 1 tablet, Oral, BID PRN    simethicone, 1 tablet, Oral, TID PRN    sodium chloride 0.9%, 10 mL, Intravenous, PRN    traZODone, 25 mg, Oral, Nightly PRN    Objective:     24-hour Vitals:   Temp:  [97.3 °F (36.3 °C)-98.4 °F (36.9  °C)] 98 °F (36.7 °C)  Pulse:  [] 100  Resp:  [13-32] 26  SpO2:  [94 %-100 %] 100 %  BP: (107-123)/(74-94) 117/91    Vitals: BP (!) 117/91   Pulse 100   Temp 98 °F (36.7 °C) (Axillary)   Resp (!) 26   Ht 6' (1.829 m)   Wt 91.5 kg (201 lb 12.8 oz)   SpO2 100%   BMI 27.37 kg/m²     Intake/Output Summary (Last 24 hours) at 5/5/2025 0758  Last data filed at 5/5/2025 0547  Gross per 24 hour   Intake 1018.75 ml   Output 1200 ml   Net -181.25 ml       Physical Exam  Vitals reviewed.   Constitutional:       General: He is not in acute distress.     Appearance: Normal appearance. He is normal weight. He is not ill-appearing.   HENT:      Head: Normocephalic and atraumatic.      Right Ear: External ear normal.      Left Ear: External ear normal.      Nose: Nose normal.   Eyes:      Conjunctiva/sclera: Conjunctivae normal.   Neck:      Comments: JVD  Cardiovascular:      Rate and Rhythm: Normal rate and regular rhythm.      Pulses: Normal pulses.      Heart sounds: Murmur heard.   Pulmonary:      Effort: Pulmonary effort is normal. No respiratory distress.      Breath sounds: No stridor. Rales present. No wheezing or rhonchi.      Comments: NC.   Chest:      Chest wall: No tenderness.   Abdominal:      General: Bowel sounds are normal. There is distension.      Palpations: Abdomen is soft.   Musculoskeletal:      Cervical back: Neck supple.      Right lower leg: Edema present.      Left lower leg: Edema present.   Skin:     General: Skin is warm and dry.      Capillary Refill: Capillary refill takes less than 2 seconds.   Neurological:      Mental Status: He is alert and oriented to person, place, and time.   Psychiatric:         Mood and Affect: Mood normal.         Behavior: Behavior normal.        Labs:   I have reviewed the following labs below:    Recent Labs   Lab 04/30/25  0539 05/01/25  0411 05/03/25  0542 05/03/25  1230 05/04/25  0215 05/04/25  1409 05/04/25  2244 05/05/25  0053 05/05/25  0206   WBC 6.62    < > 7.41  --  8.47  --   --   --  11.14   HGB 8.9*   < > 8.2*  --  7.7*  --   --   --  7.8*   HCT 27.8*   < > 25.8*  --  23.6*  --   --   --  24.2*      < > 159  --  147  --   --   --  148      < > 134*   < > 131* 131*  --   --  131*   K 3.8   < > 4.3   < > 4.0 4.2  --   --  4.3   CL 98   < > 95*   < > 93* 93*  --   --  92*   CO2 28   < > 16*   < > 21* 20*  --   --  23   BUN 17.8   < > 36.3*   < > 46.5* 48.4*  --   --  51.9*   CREATININE 0.97   < > 1.91*   < > 1.97* 1.81*  --   --  1.84*      < > 108   < > 210* 217*  --   --  165*   CALCIUM 9.9   < > 9.9   < > 9.4 9.2  --   --  9.1   MG 1.70   < > 2.10  --  2.40  --   --   --  2.30   PHOS 3.0   < > 4.0  --  3.6  --   --   --  3.4   PROT 7.6   < > 8.0*  --  8.0*  --   --   --  8.0*   ALBUMIN 3.3*   < > 3.4  --  3.4  --   --   --  3.4   BILITOT 3.8*   < > 5.4*  --  5.0*  --   --   --  4.7*   AST 42   < > 36  --  45  --   --   --  62*   ALT 20   < > 18  --  20  --   --   --  25   ALKPHOS 184*   < > 213*  --  231*  --   --   --  220*   TROPONINI  --   --   --   --   --   --  0.049* 0.055*  --    BNP 2,823.0*  --   --   --   --   --   --   --   --     < > = values in this interval not displayed.     Cardiovascular Studies/Imaging:   I have reviewed the following studies below:  TTE:   Summary  Show Result Comparison     Left Ventricle: The left ventricle is severely dilated. Severely increased ventricular mass. Mildly increased wall thickness. There is severe eccentric hypertrophy. Severe global hypokinesis present. There is severely reduced systolic function. Quantitated ejection fraction is 22%. Grade III diastolic dysfunction.    Right Ventricle: The right ventricle has moderate enlargement. Systolic function is moderately reduced.    Left Atrium: Left atrium is severely dilated measuring 92ml/m2.    Right Atrium: Right atrium is severely dilated.    Aortic Valve: The aortic valve is a trileaflet valve. There is mild aortic valve sclerosis. There  is no stenosis. There is no significant regurgitation.    Mitral Valve: The mitral valve is structurally normal. There is no stenosis. There is severe central regurgitation.    Tricuspid Valve: The tricuspid valve is structurally normal. There is moderate regurgitation.    Pulmonary Artery: There is moderate pulmonary hypertension. The estimated pulmonary artery systolic pressure is 59 mmHg.    IVC/SVC: Elevated venous pressure at 15 mmHg.    Pericardium: There is no pericardial effusion.    LHC/Cors: 3/26/2024  Coronary findings:     Dominance: right   Left main:  10-20% calcified distal left main disease  Left anterior descending artery:  50% calcified proximal stenosis  Circumflex artery:  Luminal irregularities  Right coronary artery:  Luminal irregularities    Imaging:   See imaging section for details.     Assessment:     Ran Reyes Jr. is a 69 y.o. male with NICM-HFrEF (LVEF 20-25%; G3DD), HTN, non obstructive CAD, AF on OAC, PAD, HLD, and COPD who presented with acute on chronic decompensated heart failure now in cardiogenic shock.     Plan/Recommendations:     Acute on Chronic NICM-HFrEF with CS and Severe Mitral Regurgitation   -Stop Midodrine. If still hypertensive for his degree of CMP will need further afterload reduction.   -Continue fixed dose Dobutamine 5 mcg/kg/min.   -Continue diuresis. Bumex 2 mg IV BID. Assess UOP. If inadequate UOP increase dose.   -Obtain Fe studies. If Fe restricted erythropoiesis is present consider IV Fe replacement.   -Hold BB (in CS and on inotropic support). No SGLT2i (has history of Claudine's). Will hold ARNi for now (need to stop Midodrine first and assess hemodynamic response). Will hold MRA for now.   -Trend LA every 2 hours until clear down trend. Trend SVO2.     Atrial Fibrillation  -Hold BB for now given above.   -Continue anticoagulation for CVA risk reduction.     Hypertension  -Stop Midodrine. Add GDMT as hemodynamics tolerate after assess response to  discontinuation of Midodrine.     Non Obstructive Epicardial Coronary Artery Disease  Peripheral Artery Disease  -SAPT and high intensity statin therapy.        Vernon Cifuentes MD  [facility] Chief Cardiology Fellow, PGY-6  [facility]                [1]   Social History  Tobacco Use    Smoking status: Every Day     Current packs/day: 0.50     Average packs/day: 0.8 packs/day for 50.3 years (40.9 ttl pk-yrs)     Types: Cigarettes     Start date: 1975     Passive exposure: Current    Smokeless tobacco: Never    Tobacco comments:     States smoke 2-3 cigarettes per day   Substance Use Topics    Alcohol use: Yes     Alcohol/week: 3.0 standard drinks of alcohol     Types: 3 Cans of beer per week     Comment: socially    Drug use: Not Currently     Frequency: 1.0 times per week     Types: Marijuana     Comment: no use in over 1 year

## 2025-05-06 LAB
A-ADO2 BLOOD GAS (OHS): 141 MMHG
ALLENS TEST BLOOD GAS (OHS): YES
ANION GAP SERPL CALC-SCNC: 15 MEQ/L
ANION GAP SERPL CALC-SCNC: 17 MEQ/L
ANION GAP SERPL CALC-SCNC: 17 MEQ/L
ANION GAP SERPL CALC-SCNC: 18 MEQ/L
ANION GAP SERPL CALC-SCNC: 19 MEQ/L
ANION GAP SERPL CALC-SCNC: 20 MEQ/L
BASE EXCESS BLD CALC-SCNC: 10.1 MMOL/L (ref -2–2)
BASOPHILS # BLD AUTO: 0.01 X10(3)/MCL
BASOPHILS NFR BLD AUTO: 0.1 %
BLOOD GAS SAMPLE TYPE (OHS): ABNORMAL
BUN SERPL-MCNC: 54.9 MG/DL (ref 8.4–25.7)
BUN SERPL-MCNC: 55 MG/DL (ref 8.4–25.7)
BUN SERPL-MCNC: 55.4 MG/DL (ref 8.4–25.7)
BUN SERPL-MCNC: 56 MG/DL (ref 8.4–25.7)
BUN SERPL-MCNC: 56.6 MG/DL (ref 8.4–25.7)
BUN SERPL-MCNC: 56.7 MG/DL (ref 8.4–25.7)
CALCIUM SERPL-MCNC: 9.1 MG/DL (ref 8.8–10)
CALCIUM SERPL-MCNC: 9.1 MG/DL (ref 8.8–10)
CALCIUM SERPL-MCNC: 9.2 MG/DL (ref 8.8–10)
CALCIUM SERPL-MCNC: 9.2 MG/DL (ref 8.8–10)
CALCIUM SERPL-MCNC: 9.3 MG/DL (ref 8.8–10)
CALCIUM SERPL-MCNC: 9.4 MG/DL (ref 8.8–10)
CHLORIDE SERPL-SCNC: 87 MMOL/L (ref 98–107)
CHLORIDE SERPL-SCNC: 89 MMOL/L (ref 98–107)
CHLORIDE SERPL-SCNC: 89 MMOL/L (ref 98–107)
CHLORIDE SERPL-SCNC: 91 MMOL/L (ref 98–107)
CHLORIDE SERPL-SCNC: 91 MMOL/L (ref 98–107)
CHLORIDE SERPL-SCNC: 92 MMOL/L (ref 98–107)
CO2 BLDA-SCNC: 34.4 MMOL/L (ref 22–26)
CO2 SERPL-SCNC: 23 MMOL/L (ref 23–31)
CO2 SERPL-SCNC: 23 MMOL/L (ref 23–31)
CO2 SERPL-SCNC: 25 MMOL/L (ref 23–31)
CO2 SERPL-SCNC: 29 MMOL/L (ref 23–31)
CO2 SERPL-SCNC: 30 MMOL/L (ref 23–31)
CO2 SERPL-SCNC: 30 MMOL/L (ref 23–31)
COHGB MFR BLDA: 2.6 % (ref 0.5–1.5)
CREAT SERPL-MCNC: 1.65 MG/DL (ref 0.72–1.25)
CREAT SERPL-MCNC: 1.65 MG/DL (ref 0.72–1.25)
CREAT SERPL-MCNC: 1.75 MG/DL (ref 0.72–1.25)
CREAT SERPL-MCNC: 1.79 MG/DL (ref 0.72–1.25)
CREAT SERPL-MCNC: 1.8 MG/DL (ref 0.72–1.25)
CREAT SERPL-MCNC: 1.89 MG/DL (ref 0.72–1.25)
CREAT/UREA NIT SERPL: 30
CREAT/UREA NIT SERPL: 31
CREAT/UREA NIT SERPL: 31
CREAT/UREA NIT SERPL: 32
CREAT/UREA NIT SERPL: 33
CREAT/UREA NIT SERPL: 34
DRAWN BY BLOOD GAS (OHS): ABNORMAL
EOSINOPHIL # BLD AUTO: 0 X10(3)/MCL (ref 0–0.9)
EOSINOPHIL NFR BLD AUTO: 0 %
ERYTHROCYTE [DISTWIDTH] IN BLOOD BY AUTOMATED COUNT: 24.2 % (ref 11.5–17)
GAS PNL BLD: 181 MMHG
GFR SERPLBLD CREATININE-BSD FMLA CKD-EPI: 38 ML/MIN/1.73/M2
GFR SERPLBLD CREATININE-BSD FMLA CKD-EPI: 41 ML/MIN/1.73/M2
GFR SERPLBLD CREATININE-BSD FMLA CKD-EPI: 41 ML/MIN/1.73/M2
GFR SERPLBLD CREATININE-BSD FMLA CKD-EPI: 42 ML/MIN/1.73/M2
GFR SERPLBLD CREATININE-BSD FMLA CKD-EPI: 45 ML/MIN/1.73/M2
GFR SERPLBLD CREATININE-BSD FMLA CKD-EPI: 45 ML/MIN/1.73/M2
GLUCOSE SERPL-MCNC: 144 MG/DL (ref 82–115)
GLUCOSE SERPL-MCNC: 147 MG/DL (ref 82–115)
GLUCOSE SERPL-MCNC: 161 MG/DL (ref 82–115)
GLUCOSE SERPL-MCNC: 169 MG/DL (ref 82–115)
HCO3 BLDA-SCNC: 33.2 MMOL/L (ref 22–26)
HCT VFR BLD AUTO: 22.8 % (ref 42–52)
HGB BLD-MCNC: 7.6 G/DL (ref 14–18)
HOLD SPECIMEN: NORMAL
IMM GRANULOCYTES # BLD AUTO: 0.08 X10(3)/MCL (ref 0–0.04)
IMM GRANULOCYTES NFR BLD AUTO: 0.8 %
INHALED O2 CONCENTRATION: 32 %
LACTATE SERPL-SCNC: 2.1 MMOL/L (ref 0.5–2.2)
LACTATE SERPL-SCNC: 2.7 MMOL/L (ref 0.5–2.2)
LACTATE SERPL-SCNC: 3.1 MMOL/L (ref 0.5–2.2)
LACTATE SERPL-SCNC: 3.6 MMOL/L (ref 0.5–2.2)
LPM (OHS): 3
LYMPHOCYTES # BLD AUTO: 0.48 X10(3)/MCL (ref 0.6–4.6)
LYMPHOCYTES NFR BLD AUTO: 4.7 %
MAGNESIUM SERPL-MCNC: 2.2 MG/DL (ref 1.6–2.6)
MAGNESIUM SERPL-MCNC: 2.3 MG/DL (ref 1.6–2.6)
MCH RBC QN AUTO: 24.7 PG (ref 27–31)
MCHC RBC AUTO-ENTMCNC: 33.3 G/DL (ref 33–36)
MCV RBC AUTO: 74 FL (ref 80–94)
METHGB MFR BLDA: 0.6 % (ref 0–1.5)
MONOCYTES # BLD AUTO: 0.98 X10(3)/MCL (ref 0.1–1.3)
MONOCYTES NFR BLD AUTO: 9.5 %
NEUTROPHILS # BLD AUTO: 8.77 X10(3)/MCL (ref 2.1–9.2)
NEUTROPHILS NFR BLD AUTO: 84.9 %
NRBC BLD AUTO-RTO: 0.8 %
O2 HB BLOOD GAS (OHS): 65.8 % (ref 94–100)
OHS QRS DURATION: 94 MS
OHS QRS DURATION: 96 MS
OHS QTC CALCULATION: 489 MS
OHS QTC CALCULATION: 494 MS
OXYGEN DEVICE BLOOD GAS (OHS): ABNORMAL
OXYHGB MFR BLDA: 9.6 G/DL (ref 12–18)
PCO2 BLDA: 38 MMHG (ref 35–45)
PH BLDA: 7.55 [PH] (ref 7.35–7.45)
PHOSPHATE SERPL-MCNC: 2.5 MG/DL (ref 2.3–4.7)
PHOSPHATE SERPL-MCNC: 3.1 MG/DL (ref 2.3–4.7)
PHOSPHATE SERPL-MCNC: 3.2 MG/DL (ref 2.3–4.7)
PHOSPHATE SERPL-MCNC: 3.3 MG/DL (ref 2.3–4.7)
PHOSPHATE SERPL-MCNC: 3.3 MG/DL (ref 2.3–4.7)
PHOSPHATE SERPL-MCNC: 3.4 MG/DL (ref 2.3–4.7)
PLATELET # BLD AUTO: 145 X10(3)/MCL (ref 130–400)
PLATELETS.RETICULATED NFR BLD AUTO: 5.3 % (ref 0.9–11.2)
PMV BLD AUTO: ABNORMAL FL
PO2 BLDA: 40 MMHG (ref 75–100)
POCT GLUCOSE: 151 MG/DL (ref 70–110)
POCT GLUCOSE: 157 MG/DL (ref 70–110)
POCT GLUCOSE: 190 MG/DL (ref 70–110)
POCT GLUCOSE: 240 MG/DL (ref 70–110)
POTASSIUM SERPL-SCNC: 3 MMOL/L (ref 3.5–5.1)
POTASSIUM SERPL-SCNC: 3.3 MMOL/L (ref 3.5–5.1)
POTASSIUM SERPL-SCNC: 3.3 MMOL/L (ref 3.5–5.1)
POTASSIUM SERPL-SCNC: 3.4 MMOL/L (ref 3.5–5.1)
POTASSIUM SERPL-SCNC: 3.5 MMOL/L (ref 3.5–5.1)
POTASSIUM SERPL-SCNC: 3.7 MMOL/L (ref 3.5–5.1)
RBC # BLD AUTO: 3.08 X10(6)/MCL (ref 4.7–6.1)
SAMPLE SITE BLOOD GAS (OHS): ABNORMAL
SAO2 % BLDA: 67.9 %
SODIUM SERPL-SCNC: 133 MMOL/L (ref 136–145)
SODIUM SERPL-SCNC: 134 MMOL/L (ref 136–145)
SODIUM SERPL-SCNC: 135 MMOL/L (ref 136–145)
SODIUM SERPL-SCNC: 135 MMOL/L (ref 136–145)
TROPONIN I SERPL-MCNC: 0.09 NG/ML (ref 0–0.04)
TROPONIN I SERPL-MCNC: 0.09 NG/ML (ref 0–0.04)
TROPONIN I SERPL-MCNC: 0.11 NG/ML (ref 0–0.04)
WBC # BLD AUTO: 10.32 X10(3)/MCL (ref 4.5–11.5)

## 2025-05-06 PROCEDURE — 20000000 HC ICU ROOM

## 2025-05-06 PROCEDURE — 83735 ASSAY OF MAGNESIUM: CPT

## 2025-05-06 PROCEDURE — 36415 COLL VENOUS BLD VENIPUNCTURE: CPT

## 2025-05-06 PROCEDURE — 80048 BASIC METABOLIC PNL TOTAL CA: CPT

## 2025-05-06 PROCEDURE — 36415 COLL VENOUS BLD VENIPUNCTURE: CPT | Performed by: STUDENT IN AN ORGANIZED HEALTH CARE EDUCATION/TRAINING PROGRAM

## 2025-05-06 PROCEDURE — 84484 ASSAY OF TROPONIN QUANT: CPT

## 2025-05-06 PROCEDURE — 27000207 HC ISOLATION

## 2025-05-06 PROCEDURE — 63600175 PHARM REV CODE 636 W HCPCS

## 2025-05-06 PROCEDURE — 25000003 PHARM REV CODE 250

## 2025-05-06 PROCEDURE — 84100 ASSAY OF PHOSPHORUS: CPT

## 2025-05-06 PROCEDURE — 83605 ASSAY OF LACTIC ACID: CPT

## 2025-05-06 PROCEDURE — 27000221 HC OXYGEN, UP TO 24 HOURS

## 2025-05-06 PROCEDURE — 85025 COMPLETE CBC W/AUTO DIFF WBC: CPT

## 2025-05-06 PROCEDURE — 25000242 PHARM REV CODE 250 ALT 637 W/ HCPCS

## 2025-05-06 PROCEDURE — 36600 WITHDRAWAL OF ARTERIAL BLOOD: CPT

## 2025-05-06 PROCEDURE — 25000003 PHARM REV CODE 250: Performed by: STUDENT IN AN ORGANIZED HEALTH CARE EDUCATION/TRAINING PROGRAM

## 2025-05-06 PROCEDURE — 82803 BLOOD GASES ANY COMBINATION: CPT

## 2025-05-06 PROCEDURE — 94640 AIRWAY INHALATION TREATMENT: CPT

## 2025-05-06 PROCEDURE — 94761 N-INVAS EAR/PLS OXIMETRY MLT: CPT

## 2025-05-06 PROCEDURE — 99900035 HC TECH TIME PER 15 MIN (STAT)

## 2025-05-06 RX ORDER — LORAZEPAM 2 MG/ML
2 INJECTION INTRAMUSCULAR EVERY 4 HOURS PRN
Status: DISCONTINUED | OUTPATIENT
Start: 2025-05-06 | End: 2025-05-07

## 2025-05-06 RX ORDER — POTASSIUM CHLORIDE 7.45 MG/ML
10 INJECTION INTRAVENOUS
Status: DISCONTINUED | OUTPATIENT
Start: 2025-05-06 | End: 2025-05-06

## 2025-05-06 RX ORDER — METOLAZONE 5 MG/1
5 TABLET ORAL DAILY
Status: DISCONTINUED | OUTPATIENT
Start: 2025-05-06 | End: 2025-05-06

## 2025-05-06 RX ORDER — SPIRONOLACTONE 25 MG/1
25 TABLET ORAL DAILY
Status: DISCONTINUED | OUTPATIENT
Start: 2025-05-06 | End: 2025-05-14

## 2025-05-06 RX ORDER — LEVALBUTEROL INHALATION SOLUTION 1.25 MG/3ML
1.25 SOLUTION RESPIRATORY (INHALATION) EVERY 12 HOURS
Status: DISCONTINUED | OUTPATIENT
Start: 2025-05-06 | End: 2025-05-20 | Stop reason: HOSPADM

## 2025-05-06 RX ORDER — LORAZEPAM 2 MG/ML
INJECTION INTRAMUSCULAR
Status: COMPLETED
Start: 2025-05-06 | End: 2025-05-06

## 2025-05-06 RX ORDER — IPRATROPIUM BROMIDE 0.5 MG/2.5ML
0.5 SOLUTION RESPIRATORY (INHALATION) EVERY 12 HOURS
Status: DISCONTINUED | OUTPATIENT
Start: 2025-05-06 | End: 2025-05-20 | Stop reason: HOSPADM

## 2025-05-06 RX ORDER — QUETIAPINE FUMARATE 100 MG/1
100 TABLET, FILM COATED ORAL NIGHTLY
Status: DISCONTINUED | OUTPATIENT
Start: 2025-05-06 | End: 2025-05-20 | Stop reason: HOSPADM

## 2025-05-06 RX ORDER — POTASSIUM CHLORIDE 14.9 MG/ML
20 INJECTION INTRAVENOUS
Status: COMPLETED | OUTPATIENT
Start: 2025-05-06 | End: 2025-05-06

## 2025-05-06 RX ORDER — METOLAZONE 5 MG/1
5 TABLET ORAL DAILY
Status: DISCONTINUED | OUTPATIENT
Start: 2025-05-06 | End: 2025-05-08

## 2025-05-06 RX ORDER — LORAZEPAM 2 MG/ML
0.5 INJECTION INTRAMUSCULAR ONCE
Status: COMPLETED | OUTPATIENT
Start: 2025-05-06 | End: 2025-05-06

## 2025-05-06 RX ORDER — POTASSIUM CHLORIDE 14.9 MG/ML
60 INJECTION INTRAVENOUS ONCE
Status: DISCONTINUED | OUTPATIENT
Start: 2025-05-06 | End: 2025-05-06

## 2025-05-06 RX ADMIN — CLOPIDOGREL BISULFATE 75 MG: 75 TABLET, FILM COATED ORAL at 08:05

## 2025-05-06 RX ADMIN — HYDROCORTISONE SODIUM SUCCINATE 50 MG: 100 INJECTION, POWDER, FOR SOLUTION INTRAMUSCULAR; INTRAVENOUS at 01:05

## 2025-05-06 RX ADMIN — IPRATROPIUM BROMIDE 0.5 MG: 0.5 SOLUTION RESPIRATORY (INHALATION) at 07:05

## 2025-05-06 RX ADMIN — MICONAZOLE NITRATE: 20 POWDER TOPICAL at 08:05

## 2025-05-06 RX ADMIN — LORAZEPAM 1 MG: 2 INJECTION INTRAMUSCULAR; INTRAVENOUS at 11:05

## 2025-05-06 RX ADMIN — PANTOPRAZOLE SODIUM 40 MG: 40 TABLET, DELAYED RELEASE ORAL at 08:05

## 2025-05-06 RX ADMIN — PROCHLORPERAZINE EDISYLATE 5 MG: 5 INJECTION INTRAMUSCULAR; INTRAVENOUS at 07:05

## 2025-05-06 RX ADMIN — LORAZEPAM 0.5 MG: 0.5 TABLET ORAL at 04:05

## 2025-05-06 RX ADMIN — LEVALBUTEROL HYDROCHLORIDE 1.25 MG: 1.25 SOLUTION RESPIRATORY (INHALATION) at 07:05

## 2025-05-06 RX ADMIN — RIVAROXABAN 20 MG: 10 TABLET, FILM COATED ORAL at 04:05

## 2025-05-06 RX ADMIN — IPRATROPIUM BROMIDE AND ALBUTEROL SULFATE 3 ML: .5; 3 SOLUTION RESPIRATORY (INHALATION) at 01:05

## 2025-05-06 RX ADMIN — POTASSIUM CHLORIDE 20 MEQ: 14.9 INJECTION, SOLUTION INTRAVENOUS at 08:05

## 2025-05-06 RX ADMIN — SPIRONOLACTONE 25 MG: 25 TABLET, FILM COATED ORAL at 08:05

## 2025-05-06 RX ADMIN — QUETIAPINE FUMARATE 100 MG: 100 TABLET ORAL at 07:05

## 2025-05-06 RX ADMIN — BUMETANIDE 1 MG/HR: 0.25 INJECTION INTRAMUSCULAR; INTRAVENOUS at 11:05

## 2025-05-06 RX ADMIN — HYDROXYZINE PAMOATE 25 MG: 25 CAPSULE ORAL at 07:05

## 2025-05-06 RX ADMIN — METOLAZONE 5 MG: 5 TABLET ORAL at 01:05

## 2025-05-06 RX ADMIN — PANTOPRAZOLE SODIUM 40 MG: 40 TABLET, DELAYED RELEASE ORAL at 07:05

## 2025-05-06 RX ADMIN — HYDROCORTISONE SODIUM SUCCINATE 50 MG: 100 INJECTION, POWDER, FOR SOLUTION INTRAMUSCULAR; INTRAVENOUS at 05:05

## 2025-05-06 RX ADMIN — HYDROCORTISONE SODIUM SUCCINATE 50 MG: 100 INJECTION, POWDER, FOR SOLUTION INTRAMUSCULAR; INTRAVENOUS at 07:05

## 2025-05-06 RX ADMIN — LORAZEPAM 1 MG: 2 INJECTION INTRAMUSCULAR; INTRAVENOUS at 10:05

## 2025-05-06 RX ADMIN — Medication 1000 UNITS: at 08:05

## 2025-05-06 RX ADMIN — MELATONIN TAB 3 MG 6 MG: 3 TAB at 07:05

## 2025-05-06 RX ADMIN — FOLIC ACID 1 MG: 1 TABLET ORAL at 08:05

## 2025-05-06 RX ADMIN — POTASSIUM CHLORIDE 20 MEQ: 14.9 INJECTION, SOLUTION INTRAVENOUS at 03:05

## 2025-05-06 RX ADMIN — INSULIN ASPART 2 UNITS: 100 INJECTION, SOLUTION INTRAVENOUS; SUBCUTANEOUS at 05:05

## 2025-05-06 RX ADMIN — LORAZEPAM 0.5 MG: 2 INJECTION INTRAMUSCULAR; INTRAVENOUS at 03:05

## 2025-05-06 RX ADMIN — DOBUTAMINE HYDROCHLORIDE 5 MCG/KG/MIN: 400 INJECTION INTRAVENOUS at 07:05

## 2025-05-06 RX ADMIN — ATORVASTATIN CALCIUM 80 MG: 40 TABLET, FILM COATED ORAL at 08:05

## 2025-05-06 RX ADMIN — POTASSIUM CHLORIDE 20 MEQ: 14.9 INJECTION, SOLUTION INTRAVENOUS at 05:05

## 2025-05-06 NOTE — PROGRESS NOTES
Relayed to ada, Tiffanie Gordon ([phone]), pt's request that she make all medical decisions if pt unable. Advanced Directive packet left in pt's room for pt/ashleyece to complete Medical POA. Ada acknowledged understanding as was agreeable.

## 2025-05-06 NOTE — PROGRESS NOTES
[facility] - [address] [place] [address] Telemetry  Pulmonary Critical Care Note    Patient Name: Ran Reyes Jr.  MRN: [id number]  Admission Date: 4/21/2025  Hospital Length of Stay: 14 days  Code Status: Full Code  Attending Provider: Trisha German MD  Primary Care Provider: Abiodun Recinos DO     Subjective:     HPI:   Ran Reyes Jr. is a 69 y.o. 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 ED (4/21/2025) due to generalized fatigue and weakness x 3-5 days. Patient reports he can only ambulate about 20-30 feet before having to stop due to claudication pain which is chronic and unchanged compared to baseline. Since running out of his diuretic medications approximately 5 days ago he has noted progressively worsening lower extremity swelling causing leg heaviness and weakness exacerbating difficulty ambulating.  He also notes acute on chronic cough productive of a brownish sputum without hemoptysis.  Denies fever, chills, sweats.  Denies chest pain.  Endorses shortness of breath at rest and with ambulation but unchanged compared to baseline.  Denies abdominal pain, nausea, vomiting, constipation, diarrhea.  Denies increased or decreased urinary frequency, dysuria, or hematuria.  Sleeps with 2 pillows at night due to baseline orthopnea. Has not had to increase number of pillows or change sleeping habits. Wears 2L NC home oxygen at baseline for hx of COPD. Denies having increased oxygen requirements prior to ED presentation.     ED course:  Vital signs stable.  Oxygenation  stable on 2 L nasal cannula.  CBC shows microcytic anemic (HGB 9.0; MCV 77).  CMP shows hyponatremia (Na 128), acidemia (CO2 14), elevated renal indices (BUN 25; creatinine 1.93), elevated alkaline phosphatase (). Troponin WNL.  BNP 26 39.5.  UDS negative.  EKG showed atrial fibrillation with PVCs.  Chest x-ray difficult to interpret due to overlying medical devices however appears to show cardiomegaly with bilateral pulmonary vascular congestion with question of bilateral pleural effusions. He was admitted for acute CHF exacerbation and cardiorenal syndrome.        Hospital Course/Significant events:  4/23/25: Admit to ICU, started on pressors and inotropes for cardiogenic / septic shock   5/2/25: Downgraded to    5/3/25: Re-upgraded to ICU 2/2 worsening lactate and agonal breathing     24 Hour Interval History:  Patient had episode of sustained V tach of about 30 secs yesterday afternoon and life vest alarm going off but no shock per report. His lactic acid trend was fluctuating throughout the day and no actual down trend with minimal UOP initially. Patient received one dose of IV Bumex 3 mg and was started on Bumex infusion 1mg/hr. Despite infusion, UOP not at goal and received one dose 5 mg oral Metolazone to augment diuretic effect. Total UOP reported 3.7 L and now net negative -795 ml.  Nurse reports, increased agitation overnight. A&O x 3. Patient received 2 doses of IV Ativan 0.5 mg and had his scheduled Seroquel 50 mg as well. Overnight he also had intermittent episodes of NSVT.  Afebrile, BP stable, tachycardia noted and remains on 4L oxygen via NC but with tachypnea. CBC noted for H&H of 7.6/22.8. BMP with mild hyponatremia, potassium at 3.7 and renal indices some improvement at 5.7/1.89. Most recent lactic acid at 3.6.     Past Medical History:   Diagnosis Date    A-fib     Anticoagulant long-term use     Anxiety     Aortic aneurysm     Cataract     CHF (congestive heart failure)     Chronic  atrial fibrillation     COPD (chronic obstructive pulmonary disease)     Coronary artery disease     Depression     HLD (hyperlipidemia)     Hypertension     Mitral regurgitation     PAD (peripheral artery disease)     Primary open angle glaucoma (POAG)        Past Surgical History:   Procedure Laterality Date    ANGIOGRAM, CORONARY, WITH LEFT HEART CATHETERIZATION N/A 03/26/2024    Procedure: Angiogram, Coronary, with Left Heart Cath;  Surgeon: Adriano Montiel MD;  Location: [facility] CATH LAB;  Service: Cardiology;  Laterality: N/A;    ATHERECTOMY OF PERIPHERAL VESSEL Left 09/12/2022    LEFT SFA ATHERECTOMY, BALLOON ANGIOPLASTY    CATARACT EXTRACTION W/  INTRAOCULAR LENS IMPLANT Left 03/09/2023    Procedure: EXTRACTION, CATARACT, WITH IOL INSERTION;  Surgeon: Georgi Borja MD;  Location: [facility];  Service: Ophthalmology;  Laterality: Left;  19.5  mac    EGD, WITH CLOSED BIOPSY  03/22/2024    Procedure: EGD, WITH CLOSED BIOPSY;  Surgeon: Ronald Calderon MD;  Location: [facility] ENDOSCOPY;  Service: Gastroenterology;;    ESOPHAGOGASTRODUODENOSCOPY N/A 03/22/2024    Procedure: EGD;  Surgeon: Ronald Calderon MD;  Location: [facility] ENDOSCOPY;  Service: Gastroenterology;  Laterality: N/A;    Heart Stent N/A     > 10yrs. AGO    INCISION AND DRAINAGE N/A 03/18/2024    Procedure: Incision and Drainage;  Surgeon: Fahad Rivas MD;  Location: [facility];  Service: Urology;  Laterality: N/A;  I&D SCROTAL ABSCESS    INSERTION OF STENT INTO PERIPHERAL VESSEL Right 10/17/2022    RIGHT SFA ATHERECTOMY, BALLOON ANGIOPLASTY, STENT; RIGHT ANTERIOR TIBIAL ARTERY ATHERECTOMY, BALLOON ANGIOPLASTY    ORCHIECTOMY Left 03/18/2024    Procedure: ORCHIECTOMY;  Surgeon: Fahad Rivas MD;  Location: [facility];  Service: Urology;  Laterality: Left;       Social History[1]    Current Outpatient Medications   Medication Instructions    acetaminophen 650 mg, 2 times daily PRN    ALBUTEROL INHL 2 puffs, Every 6 hours PRN     albuterol-ipratropium (DUO-NEB) 2.5 mg-0.5 mg/3 mL nebulizer solution 3 mLs, Nebulization, Every 6 hours PRN, Rescue    aspirin (ECOTRIN) 81 MG EC tablet 1 tablet, Daily    atorvastatin (LIPITOR) 80 mg, Oral, Daily    BREZTRI AEROSPHERE 160-9-4.8 mcg/actuation HFAA 2 puffs, 2 times daily    cetirizine (ZYRTEC) 10 mg, Oral, Daily    clopidogreL (PLAVIX) 75 mg, Oral, Daily    folic acid (FOLVITE) 1 mg, Oral, Daily    furosemide (LASIX) 40 mg, Oral, 2 times daily    gabapentin (NEURONTIN) 300 mg, Oral, 3 times daily    metoprolol succinate (TOPROL-XL) 12.5 mg, Oral, Daily    nicotine (NICODERM CQ) 14 mg/24 hr 1 patch, Transdermal, Daily PRN    nicotine (NICODERM CQ) 21 mg/24 hr 1 patch, Transdermal, Daily    nitrofurantoin, macrocrystal-monohydrate, (MACROBID) 100 MG capsule 100 mg, Oral, 2 times daily    olopatadine (PATANOL) 0.1 % ophthalmic solution Apply to eye.    pantoprazole (PROTONIX) 40 mg, Oral, 2 times daily    potassium chloride SA (K-DUR,KLOR-CON) 20 MEQ tablet 20 mEq, Oral, 2 times daily    rivaroxaban (XARELTO) 20 mg, Oral, Daily    sacubitriL-valsartan (ENTRESTO) 49-51 mg per tablet 1 tablet, Daily    sertraline (ZOLOFT) 100 mg, Oral, Daily    urea (CARMOL) 40 % Crea Apply topically.    varenicline tartrate (CHANTIX) 0.5 mg    vitamin D (VITAMIN D3) 1,000 Units, Daily     Current Inpatient Medications   atorvastatin  80 mg Oral Daily    clopidogreL  75 mg Oral Daily    folic acid  1 mg Oral Daily    hydrocortisone sodium succinate  50 mg Intravenous Q6H    miconazole NITRATE 2 %   Topical (Top) BID    pantoprazole  40 mg Oral BID    QUEtiapine  50 mg Oral QHS    rivaroxaban  20 mg Oral Daily    vitamin D  1,000 Units Oral Daily     Current Intravenous Infusions   0.9% NaCl   Intravenous Continuous   Stopped at 05/04/25 5774    bumetanide (BUMEX) 25 mg in 100 mL infusion (conc: 0.25 mg/mL)  1 mg/hr Intravenous Continuous 4 mL/hr at 05/05/25 1800 1 mg/hr at 05/05/25 1800    DOBUTamine IV infusion  (non-titrating)  5 mcg/kg/min Intravenous Continuous 6.9 mL/hr at 05/05/25 1800 5 mcg/kg/min at 05/05/25 1800     ROS: neg unless stated above        Objective:     Intake/Output Summary (Last 24 hours) at 5/6/2025 0610  Last data filed at 5/6/2025 0439  Gross per 24 hour   Intake 201.73 ml   Output 2565 ml   Net -2363.27 ml     Vital Signs (Most Recent):  Temp: 96.7 °F (35.9 °C) (05/06/25 0301)  Pulse: 110 (05/06/25 0400)  Resp: 20 (05/06/25 0400)  BP: 108/87 (05/06/25 0400)  SpO2: (!) 90 % (05/06/25 0400)  Body mass index is 27.37 kg/m².  Weight: 91.5 kg (201 lb 12.8 oz) Vital Signs (24h Range):  Temp:  [96.7 °F (35.9 °C)-98.1 °F (36.7 °C)] 96.7 °F (35.9 °C)  Pulse:  [] 110  Resp:  [20-38] 20  SpO2:  [89 %-100 %] 90 %  BP: (108-133)/(72-96) 108/87     Physical Exam:  General: Mild distress, on 4L supplemental oxygen  HEENT: NC/AT; PERRL; scleral icterus, nasal and oral mucosa moist and clear  Pulm: + rales bibasilar and expiratory wheezing noted.  CV: S1, S2 irregular irregular w/o murmurs or gallops; JVD present with +HJR, 1-2+ edema noted of BLE  GI: Bowel sound present in all quadrants, abdomen distended with pain on deep palpation around lower quadrant  MSK: Full ROM of all extremities and spine w/o limitation or discomfort  Derm: No rashes, abnormal bruising, or skin lesions  Neuro: AAOx1; motor/sensory function intact  Psych: Anxious and agitated    Lines/Drains/Airways       Peripherally Inserted Central Catheter Line  Duration             PICC Triple Lumen 04/24/25 0942 right brachial 11 days              Drain  Duration                  Urethral Catheter 05/05/25 0808 Silicone 16 Fr. <1 day                  Significant Labs:  Lab Results   Component Value Date    WBC 10.32 05/06/2025    HGB 7.6 (L) 05/06/2025    HCT 22.8 (L) 05/06/2025    MCV 74.0 (L) 05/06/2025     05/06/2025       BMP  Lab Results   Component Value Date     (L) 05/06/2025    K 3.7 05/06/2025    CO2 23 05/06/2025     BUN 56.7 (H) 05/06/2025    CREATININE 1.89 (H) 05/06/2025    CALCIUM 9.2 05/06/2025    AGAP 19.0 05/06/2025    EGFRNONAA 88 (L) 12/06/2021     ABG  Recent Labs   Lab 05/05/25  0752   PH 7.500*   PO2 72.0*   PCO2 31.0*   HCO3 24.2     Mechanical Ventilation Support:  Oxygen Concentration (%): 36 (05/04/25 1917)    Significant Imaging:  I have reviewed the pertinent imaging within the past 24 hours.    Assessment/Plan:     Assessment  Cardiogenic Shock  Lactic Acidosis   HFrEF (22%, G3DD), Pulmonary Hypertension  NOCAD, NICMO  NSTEMI likely demand  Chronic persistent Afib  Hx of VT arrest (has life vest)  ELIZABETH   Adrenal Insufficiency   Low cortisol on ata stim test and normal plast ACTH level  COPD   Microcytic Anemia   Low TSAT 7%  Prolonged QTC  Groin itch  Anxiety/Insomnia     Plan  - Continue ongoing ICU level of care  - Appreciate cardiology recommendations  - Continue Dobutamine 5 mcg/min  - Continue Bumex infusion 1mg/hr   - Start Metolazone 5 mg once daily  - Trend lactic acid q2h   - Trend troponin every 6 hours  - Discontinue ASA, Continue plavix and statin  - Continue Xarelto 20 mg daily  - Hold beta blocker until patient euvolemic again; Midodrine discontinued.  - Avoid nephrotoxins   - Will change Duoneb to Lev albuterol and ipratropium q12h  - Strict I&O and daily weights  - LDSSI added today  - Continue Miconazole powder BID   - Increase Seroquel to 100 mg qhs; Ativan prn ordered  - Agitation/anxiety could be steroid induced however patient has Adrenal insufficiency. Will decrease IV Hydrocortisone 50 mg to q12h     CODE STATUS: Full Code  Access: PICC and PIV  Antibiotics: none  Diet: Cardiac  DVT Prophylaxis: Xarelto 20 mg  GI Prophylaxis: PPI  Fluids: None     32 minutes of critical care was time spent personally by me on the following activities: development of treatment plan with patient or surrogate and bedside caregivers, discussions with consultants, evaluation of patient's response to  treatment, examination of patient, ordering and performing treatments and interventions, ordering and review of laboratory studies, ordering and review of radiographic studies, pulse oximetry, re-evaluation of patient's condition.  This critical care time did not overlap with that of any other provider or involve time for any procedures.     Valerio Shrestha MD PGY-2  Pulmonary Critical Care Medicine  [facility] [address] [facility] Telemetry         [1]   Social History  Socioeconomic History    Marital status: Single   Tobacco Use    Smoking status: Every Day     Current packs/day: 0.50     Average packs/day: 0.8 packs/day for 50.3 years (40.9 ttl pk-yrs)     Types: Cigarettes     Start date: 1975     Passive exposure: Current    Smokeless tobacco: Never    Tobacco comments:     States smoke 2-3 cigarettes per day   Substance and Sexual Activity    Alcohol use: Yes     Alcohol/week: 3.0 standard drinks of alcohol     Types: 3 Cans of beer per week     Comment: socially    Drug use: Not Currently     Frequency: 1.0 times per week     Types: Marijuana     Comment: no use in over 1 year    Sexual activity: Not Currently     Partners: Female     Social Drivers of Health     Financial Resource Strain: Low Risk  (4/23/2025)    Overall Financial Resource Strain (CARDIA)     Difficulty of Paying Living Expenses: Not hard at all   Food Insecurity: No Food Insecurity (4/23/2025)    Hunger Vital Sign     Worried About Running Out of Food in the Last Year: Never true     Ran Out of Food in the Last Year: Never true   Transportation Needs: No Transportation Needs (4/23/2025)    PRAPARE - Transportation     Lack of Transportation (Medical): No     Lack of Transportation (Non-Medical): No   Physical Activity: Inactive (12/17/2024)    Exercise Vital Sign     Days of Exercise per Week: 0 days     Minutes of Exercise per Session: 0 min   Stress: No Stress Concern Present (4/23/2025)    [employer]  - Occupational Stress Questionnaire     Feeling of Stress : Not at all   Housing Stability: Low Risk  (4/23/2025)    Housing Stability Vital Sign     Unable to Pay for Housing in the Last Year: No     Homeless in the Last Year: No

## 2025-05-06 NOTE — PROGRESS NOTES
Cardiology Progress Note     Cardiology Attending: Dayanna Johnson MD  Cardiology Fellow: Vernon Cifuentes MD     Date of Admit: 4/21/2025  Date of Consult: 5/6/2025    History of Present Illness:      Ran Reyes Jr. is a 69 y.o. male with NICM-HFrEF (LVEF 20-25%; G3DD), HTN, non obstructive CAD, AF on OAC, PAD, HLD, and COPD who presented with acute on chronic decompensated heart failure now in cardiogenic shock.     Patient reports continued dyspnea and orthopnea. The patient was started on Bumex GGT with improvement in UOP over the last 24 hours. Now net negative 3.4 L. Remains on supplemental oxygenation. Altered this morning (oriented to person, place, self - but otherwise unintelligible speech).       Past Medical History:     Past Medical History:   Diagnosis Date    A-fib     Anticoagulant long-term use     Anxiety     Aortic aneurysm     Cataract     CHF (congestive heart failure)     Chronic atrial fibrillation     COPD (chronic obstructive pulmonary disease)     Coronary artery disease     Depression     HLD (hyperlipidemia)     Hypertension     Mitral regurgitation     PAD (peripheral artery disease)     Primary open angle glaucoma (POAG)      Surgical History:     Past Surgical History:   Procedure Laterality Date    ANGIOGRAM, CORONARY, WITH LEFT HEART CATHETERIZATION N/A 03/26/2024    Procedure: Angiogram, Coronary, with Left Heart Cath;  Surgeon: Adriano Montiel MD;  Location: [facility] CATH LAB;  Service: Cardiology;  Laterality: N/A;    ATHERECTOMY OF PERIPHERAL VESSEL Left 09/12/2022    LEFT SFA ATHERECTOMY, BALLOON ANGIOPLASTY    CATARACT EXTRACTION W/  INTRAOCULAR LENS IMPLANT Left 03/09/2023    Procedure: EXTRACTION, CATARACT, WITH IOL INSERTION;  Surgeon: Georgi Borja MD;  Location: [facility];  Service: Ophthalmology;  Laterality: Left;  19.5  mac    EGD, WITH CLOSED BIOPSY  03/22/2024    Procedure: EGD, WITH CLOSED BIOPSY;  Surgeon: Ronald Calderon MD;  Location: [facility] ENDOSCOPY;   Service: Gastroenterology;;    ESOPHAGOGASTRODUODENOSCOPY N/A 03/22/2024    Procedure: EGD;  Surgeon: Ronald Calderon MD;  Location: [facility] ENDOSCOPY;  Service: Gastroenterology;  Laterality: N/A;    Heart Stent N/A     > 10yrs. AGO    INCISION AND DRAINAGE N/A 03/18/2024    Procedure: Incision and Drainage;  Surgeon: Fahad Rivas MD;  Location: [facility] OR;  Service: Urology;  Laterality: N/A;  I&D SCROTAL ABSCESS    INSERTION OF STENT INTO PERIPHERAL VESSEL Right 10/17/2022    RIGHT SFA ATHERECTOMY, BALLOON ANGIOPLASTY, STENT; RIGHT ANTERIOR TIBIAL ARTERY ATHERECTOMY, BALLOON ANGIOPLASTY    ORCHIECTOMY Left 03/18/2024    Procedure: ORCHIECTOMY;  Surgeon: Fahad Rivas MD;  Location: [facility];  Service: Urology;  Laterality: Left;     Allergies:   Review of patient's allergies indicates:  No Known Allergies  Family History:     Family History   Problem Relation Name Age of Onset    Cancer Mother      Hypertension Mother      Heart failure Father      Stroke Father      Hypertension Father      No Known Problems Sister       Social History:   Social History[1]  Review of Systems:     As above.   Medications:     Home Medications:  Prior to Admission medications    Medication Sig Start Date End Date Taking? Authorizing Provider   aspirin (ECOTRIN) 81 MG EC tablet Take 1 tablet by mouth once daily. 3/14/25  Yes Provider, Historical   olopatadine (PATANOL) 0.1 % ophthalmic solution Apply to eye. 2/7/25  Yes Provider, Historical   sacubitriL-valsartan (ENTRESTO) 49-51 mg per tablet Take 1 tablet by mouth once daily. 2/12/25  Yes Provider, Historical   urea (CARMOL) 40 % Crea Apply topically. 2/13/25  Yes Provider, Historical   varenicline tartrate (CHANTIX) 1 mg Tab Take 0.5 mg by mouth. 2/13/25  Yes Provider, Historical   acetaminophen 325 mg Cap Take 650 mg by mouth 2 (two) times daily as needed.    Provider, Historical   ALBUTEROL INHL Inhale 2 puffs into the lungs every 6 (six) hours as needed.     Provider, Historical   albuterol-ipratropium (DUO-NEB) 2.5 mg-0.5 mg/3 mL nebulizer solution Take 3 mLs by nebulization every 6 (six) hours as needed for Wheezing. Rescue 12/9/24   Diana Hassan MD   atorvastatin (LIPITOR) 80 MG tablet Take 1 tablet (80 mg total) by mouth once daily. 3/4/25 3/4/26  Marino Marquez DO   BREZTRI AEROSPHERE 160-9-4.8 mcg/actuation HFAA Inhale 2 puffs into the lungs 2 (two) times daily. 1/7/25   Provider, Historical   cetirizine (ZYRTEC) 10 MG tablet Take 1 tablet (10 mg total) by mouth once daily. 8/8/24 8/3/25  Abiodun Recinos DO   clopidogreL (PLAVIX) 75 mg tablet Take 1 tablet (75 mg total) by mouth once daily. 3/4/25   Marino Marquez DO   folic acid (FOLVITE) 1 MG tablet Take 1 tablet (1 mg total) by mouth once daily. 4/9/24 4/9/25  Tha Edmond MD   furosemide (LASIX) 40 MG tablet Take 1 tablet (40 mg total) by mouth 2 (two) times a day. 3/4/25   Marino Marquez DO   gabapentin (NEURONTIN) 300 MG capsule Take 300 mg by mouth 3 (three) times daily.    Provider, Historical   metoprolol succinate (TOPROL-XL) 25 MG 24 hr tablet Take 0.5 tablets (12.5 mg total) by mouth once daily. 3/4/25 3/4/26  Marino Marquez DO   nicotine (NICODERM CQ) 14 mg/24 hr Place 1 patch onto the skin daily as needed. 3/4/25   Marino Marquez DO   nicotine (NICODERM CQ) 21 mg/24 hr Place 1 patch onto the skin once daily. 3/20/25   Heaven Page MD   nitrofurantoin, macrocrystal-monohydrate, (MACROBID) 100 MG capsule Take 1 capsule (100 mg total) by mouth 2 (two) times daily. 3/4/25   Marino Marquez DO   pantoprazole (PROTONIX) 40 MG tablet Take 1 tablet (40 mg total) by mouth 2 (two) times a day. 9/23/24   Abiodun Recinos, DO   potassium chloride SA (K-DUR,KLOR-CON) 20 MEQ tablet Take 1 tablet (20 mEq total) by mouth 2 (two) times daily. 1/28/25 1/28/26  Vernon Cifuentes MD   rivaroxaban (XARELTO) 20 mg Tab Take 1 tablet (20 mg total) by mouth Daily. 3/4/25   Marino Marquez DO   sertraline (ZOLOFT) 100  MG tablet Take 100 mg by mouth once daily.    Provider, Historical   vitamin D (VITAMIN D3) 1000 units Tab Take 1,000 Units by mouth once daily.    Provider, Historical       Current/Inpatient Medications:  Infusions:   0.9% NaCl   Intravenous Continuous   Stopped at 05/04/25 2254    bumetanide (BUMEX) 25 mg in 100 mL infusion (conc: 0.25 mg/mL)  1 mg/hr Intravenous Continuous 4 mL/hr at 05/06/25 0613 1 mg/hr at 05/06/25 0613    DOBUTamine IV infusion (non-titrating)  5 mcg/kg/min Intravenous Continuous 6.9 mL/hr at 05/06/25 0613 5 mcg/kg/min at 05/06/25 0613     Scheduled:   atorvastatin  80 mg Oral Daily    clopidogreL  75 mg Oral Daily    folic acid  1 mg Oral Daily    hydrocortisone sodium succinate  50 mg Intravenous Q6H    ipratropium  0.5 mg Nebulization Q12H    levalbuterol  1.25 mg Nebulization Q12H    metOLazone  5 mg Oral Daily    miconazole NITRATE 2 %   Topical (Top) BID    pantoprazole  40 mg Oral BID    QUEtiapine  50 mg Oral QHS    rivaroxaban  20 mg Oral Daily    vitamin D  1,000 Units Oral Daily     PRN:    Current Facility-Administered Medications:     acetaminophen, 650 mg, Oral, Q4H PRN    aluminum-magnesium hydroxide, 30 mL, Oral, BID PRN    dextrose 50%, 12.5 g, Intravenous, PRN    dextrose 50%, 25 g, Intravenous, PRN    glucagon (human recombinant), 1 mg, Intramuscular, PRN    glucose, 16 g, Oral, PRN    glucose, 24 g, Oral, PRN    hydrALAZINE, 10 mg, Intravenous, Q4H PRN    hydrocortisone, , Topical (Top), TID PRN    hydrOXYzine pamoate, 25 mg, Oral, Q6H PRN    insulin aspart U-100, 0-5 Units, Subcutaneous, QID (AC + HS) PRN    labetalol, 10 mg, Intravenous, Q4H PRN    LORazepam, 0.5 mg, Oral, Q8H PRN    LORazepam, 2 mg, Oral, Q4H PRN    melatonin, 6 mg, Oral, Nightly PRN    naloxone, 0.02 mg, Intravenous, PRN    nicotine, 1 patch, Transdermal, Daily PRN    polyethylene glycol, 17 g, Oral, Daily PRN    prochlorperazine, 5 mg, Intravenous, Q6H PRN    senna-docusate, 1 tablet, Oral, BID PRN     simethicone, 1 tablet, Oral, TID PRN    sodium chloride 0.9%, 10 mL, Intravenous, PRN    traZODone, 25 mg, Oral, Nightly PRN    Objective:     24-hour Vitals:   Temp:  [96.7 °F (35.9 °C)-98.1 °F (36.7 °C)] 96.7 °F (35.9 °C)  Pulse:  [] 120  Resp:  [20-38] 23  SpO2:  [89 %-100 %] 95 %  BP: (103-133)/(72-96) 121/80    Vitals: /80   Pulse (!) 120   Temp 96.7 °F (35.9 °C)   Resp (!) 23   Ht 6' (1.829 m)   Wt 91.5 kg (201 lb 12.8 oz)   SpO2 95%   BMI 27.37 kg/m²     Intake/Output Summary (Last 24 hours) at 5/6/2025 0707  Last data filed at 5/6/2025 0613  Gross per 24 hour   Intake 326.1 ml   Output 3765 ml   Net -3438.9 ml       Physical Exam  Vitals reviewed.   Constitutional:       General: He is not in acute distress.     Appearance: Normal appearance. He is normal weight. He is not ill-appearing.   HENT:      Head: Normocephalic and atraumatic.      Right Ear: External ear normal.      Left Ear: External ear normal.      Nose: Nose normal.   Eyes:      Conjunctiva/sclera: Conjunctivae normal.   Neck:      Comments: JVD  Cardiovascular:      Rate and Rhythm: Normal rate and regular rhythm.      Pulses: Normal pulses.      Heart sounds: Murmur heard.   Pulmonary:      Effort: Pulmonary effort is normal. No respiratory distress.      Breath sounds: No stridor. Rales present. No wheezing or rhonchi.      Comments: NC.   Chest:      Chest wall: No tenderness.   Abdominal:      General: Bowel sounds are normal. There is distension.      Palpations: Abdomen is soft.   Musculoskeletal:      Cervical back: Neck supple.      Right lower leg: Edema present.      Left lower leg: Edema present.   Skin:     General: Skin is warm and dry.      Capillary Refill: Capillary refill takes less than 2 seconds.   Neurological:      Mental Status: He is alert.   Psychiatric:         Mood and Affect: Mood normal.         Behavior: Behavior normal.        Labs:   I have reviewed the following labs below:    Recent Labs    Lab 04/30/25  0539 05/01/25  0411 05/03/25  0542 05/03/25  1230 05/04/25  0215 05/04/25  1409 05/05/25  0206 05/05/25  0841 05/05/25  1429 05/05/25  1621 05/05/25  2039 05/06/25  0318   WBC 6.62   < > 7.41  --  8.47  --  11.14  --   --   --   --  10.32   HGB 8.9*   < > 8.2*  --  7.7*  --  7.8*  --   --   --   --  7.6*   HCT 27.8*   < > 25.8*  --  23.6*  --  24.2*  --   --   --   --  22.8*      < > 159  --  147  --  148  --   --   --   --  145      < > 134*   < > 131*   < > 131*  --   --  131* 131* 134*   K 3.8   < > 4.3   < > 4.0   < > 4.3  --   --  4.2 4.3 3.7   CL 98   < > 95*   < > 93*   < > 92*  --   --  92* 91* 92*   CO2 28   < > 16*   < > 21*   < > 23  --   --  21* 22* 23   BUN 17.8   < > 36.3*   < > 46.5*   < > 51.9*  --   --  59.6* 58.6* 56.7*   CREATININE 0.97   < > 1.91*   < > 1.97*   < > 1.84*  --   --  2.07* 2.08* 1.89*      < > 108   < > 210*   < > 165*  --   --  170* 226* 147*   CALCIUM 9.9   < > 9.9   < > 9.4   < > 9.1  --   --  9.1 9.1 9.2   MG 1.70   < > 2.10  --  2.40  --  2.30  --   --  2.30 2.40 2.20   PHOS 3.0   < > 4.0  --  3.6  --  3.4  --   --  3.6 3.0 3.1   PROT 7.6   < > 8.0*  --  8.0*  --  8.0*  --   --   --   --   --    ALBUMIN 3.3*   < > 3.4  --  3.4  --  3.4  --   --   --   --   --    BILITOT 3.8*   < > 5.4*  --  5.0*  --  4.7*  --   --   --   --   --    AST 42   < > 36  --  45  --  62*  --   --   --   --   --    ALT 20   < > 18  --  20  --  25  --   --   --   --   --    ALKPHOS 184*   < > 213*  --  231*  --  220*  --   --   --   --   --    TROPONINI  --   --   --   --   --    < >  --  0.075* 0.078* 0.083*  --   --    BNP 2,823.0*  --   --   --   --   --   --   --   --   --   --   --     < > = values in this interval not displayed.     Cardiovascular Studies/Imaging:   I have reviewed the following studies below:  TTE:   Summary  Show Result Comparison     Left Ventricle: The left ventricle is severely dilated. Severely increased ventricular mass. Mildly increased  wall thickness. There is severe eccentric hypertrophy. Severe global hypokinesis present. There is severely reduced systolic function. Quantitated ejection fraction is 22%. Grade III diastolic dysfunction.    Right Ventricle: The right ventricle has moderate enlargement. Systolic function is moderately reduced.    Left Atrium: Left atrium is severely dilated measuring 92ml/m2.    Right Atrium: Right atrium is severely dilated.    Aortic Valve: The aortic valve is a trileaflet valve. There is mild aortic valve sclerosis. There is no stenosis. There is no significant regurgitation.    Mitral Valve: The mitral valve is structurally normal. There is no stenosis. There is severe central regurgitation.    Tricuspid Valve: The tricuspid valve is structurally normal. There is moderate regurgitation.    Pulmonary Artery: There is moderate pulmonary hypertension. The estimated pulmonary artery systolic pressure is 59 mmHg.    IVC/SVC: Elevated venous pressure at 15 mmHg.    Pericardium: There is no pericardial effusion.    LHC/Cors: 3/26/2024  Coronary findings:     Dominance: right   Left main:  10-20% calcified distal left main disease  Left anterior descending artery:  50% calcified proximal stenosis  Circumflex artery:  Luminal irregularities  Right coronary artery:  Luminal irregularities    Imaging:   See imaging section for details.     Assessment:     Ran Reyes Jr. is a 69 y.o. male with NICM-HFrEF (LVEF 20-25%; G3DD), HTN, non obstructive CAD, AF on OAC, PAD, HLD, and COPD who presented with acute on chronic decompensated heart failure now in cardiogenic shock.     Plan/Recommendations:     Acute on Chronic NICM-HFrEF with CS and Severe Mitral Regurgitation   -Continue fixed dose Dobutamine 5 mcg/kg/min.   -Continue diuresis. Bumex 1 mg/hr GGT. Metolazone 5 mg today.    -Hold BB (in CS and on inotropic support). No SGLT2i (has history of Claudine's). Will hold ARNi for now (improving acute renal injury). Start  Aldactone 25 mg daily (acute renal injury likely renovascular congestion - sequential nephron blockade with decongestion that should improve acute renal injury).   -Trend LA to resolution.      Atrial Fibrillation  -Hold BB for now given above.   -Continue anticoagulation for CVA risk reduction.     Hypertension  -Start Aldactone for GDMT and sequential nephron blockade.     Non Obstructive Epicardial Coronary Artery Disease  Peripheral Artery Disease  -SAPT and high intensity statin therapy.        Vernon Cifuentes MD  [facility] Chief Cardiology Fellow, PGY-6  [facility]                [1]   Social History  Tobacco Use    Smoking status: Every Day     Current packs/day: 0.50     Average packs/day: 0.8 packs/day for 50.3 years (40.9 ttl pk-yrs)     Types: Cigarettes     Start date: 1975     Passive exposure: Current    Smokeless tobacco: Never    Tobacco comments:     [facility] smoke 2-3 cigarettes per day   Substance Use Topics    Alcohol use: Yes     Alcohol/week: 3.0 standard drinks of alcohol     Types: 3 Cans of beer per week     Comment: socially    Drug use: Not Currently     Frequency: 1.0 times per week     Types: Marijuana     Comment: no use in over 1 year

## 2025-05-06 NOTE — PLAN OF CARE
Problem: Skin Injury Risk Increased  Goal: Skin Health and Integrity  Outcome: Progressing     Problem: Adult Inpatient Plan of Care  Goal: Plan of Care Review  Outcome: Progressing  Goal: Patient-Specific Goal (Individualized)  Outcome: Progressing  Goal: Absence of Hospital-Acquired Illness or Injury  Outcome: Progressing  Goal: Optimal Comfort and Wellbeing  Outcome: Progressing  Goal: Readiness for Transition of Care  Outcome: Progressing     Problem: COPD (Chronic Obstructive Pulmonary Disease)  Goal: Optimal Chronic Illness Coping  Outcome: Progressing  Goal: Optimal Level of Functional Green  Outcome: Progressing  Goal: Absence of Infection Signs and Symptoms  Outcome: Progressing  Goal: Improved Oral Intake  Outcome: Progressing     Problem: Heart Failure  Goal: Optimal Coping  Outcome: Progressing  Goal: Optimal Cardiac Output  Outcome: Progressing  Goal: Stable Heart Rate and Rhythm  Outcome: Progressing  Goal: Optimal Functional Ability  Outcome: Progressing  Goal: Fluid and Electrolyte Balance  Outcome: Progressing  Goal: Improved Oral Intake  Outcome: Progressing     Problem: Fall Injury Risk  Goal: Absence of Fall and Fall-Related Injury  Outcome: Progressing     Problem: Wound  Goal: Optimal Coping  Outcome: Progressing  Goal: Optimal Functional Ability  Outcome: Progressing  Goal: Absence of Infection Signs and Symptoms  Outcome: Progressing  Goal: Improved Oral Intake  Outcome: Progressing  Goal: Optimal Pain Control and Function  Outcome: Progressing  Goal: Skin Health and Integrity  Outcome: Progressing  Goal: Optimal Wound Healing  Outcome: Progressing     Problem: Acute Kidney Injury/Impairment  Goal: Fluid and Electrolyte Balance  Outcome: Progressing  Goal: Improved Oral Intake  Outcome: Progressing  Goal: Effective Renal Function  Outcome: Progressing     Problem: Dysrhythmia  Goal: Normalized Cardiac Rhythm  Outcome: Progressing     Problem: Infection  Goal: Absence of Infection  Signs and Symptoms  Outcome: Progressing

## 2025-05-07 DIAGNOSIS — N13.8 BPH WITH OBSTRUCTION/LOWER URINARY TRACT SYMPTOMS: Primary | ICD-10-CM

## 2025-05-07 DIAGNOSIS — N40.1 BPH WITH OBSTRUCTION/LOWER URINARY TRACT SYMPTOMS: Primary | ICD-10-CM

## 2025-05-07 LAB
ANION GAP SERPL CALC-SCNC: 17 MEQ/L
ANION GAP SERPL CALC-SCNC: 18 MEQ/L
ANION GAP SERPL CALC-SCNC: 18 MEQ/L
ANION GAP SERPL CALC-SCNC: 19 MEQ/L
ANION GAP SERPL CALC-SCNC: 20 MEQ/L
ANION GAP SERPL CALC-SCNC: 21 MEQ/L
BASOPHILS # BLD AUTO: 0 X10(3)/MCL
BASOPHILS NFR BLD AUTO: 0 %
BUN SERPL-MCNC: 52.7 MG/DL (ref 8.4–25.7)
BUN SERPL-MCNC: 55.4 MG/DL (ref 8.4–25.7)
BUN SERPL-MCNC: 56.3 MG/DL (ref 8.4–25.7)
BUN SERPL-MCNC: 57.5 MG/DL (ref 8.4–25.7)
BUN SERPL-MCNC: 59.4 MG/DL (ref 8.4–25.7)
BUN SERPL-MCNC: 60.6 MG/DL (ref 8.4–25.7)
CALCIUM SERPL-MCNC: 9.1 MG/DL (ref 8.8–10)
CALCIUM SERPL-MCNC: 9.3 MG/DL (ref 8.8–10)
CALCIUM SERPL-MCNC: 9.4 MG/DL (ref 8.8–10)
CALCIUM SERPL-MCNC: 9.5 MG/DL (ref 8.8–10)
CHLORIDE SERPL-SCNC: 78 MMOL/L (ref 98–107)
CHLORIDE SERPL-SCNC: 79 MMOL/L (ref 98–107)
CHLORIDE SERPL-SCNC: 81 MMOL/L (ref 98–107)
CHLORIDE SERPL-SCNC: 81 MMOL/L (ref 98–107)
CHLORIDE SERPL-SCNC: 83 MMOL/L (ref 98–107)
CHLORIDE SERPL-SCNC: 86 MMOL/L (ref 98–107)
CO2 SERPL-SCNC: 30 MMOL/L (ref 23–31)
CO2 SERPL-SCNC: 33 MMOL/L (ref 23–31)
CO2 SERPL-SCNC: 33 MMOL/L (ref 23–31)
CO2 SERPL-SCNC: 35 MMOL/L (ref 23–31)
CREAT SERPL-MCNC: 1.54 MG/DL (ref 0.72–1.25)
CREAT SERPL-MCNC: 1.64 MG/DL (ref 0.72–1.25)
CREAT SERPL-MCNC: 1.7 MG/DL (ref 0.72–1.25)
CREAT SERPL-MCNC: 1.71 MG/DL (ref 0.72–1.25)
CREAT SERPL-MCNC: 1.71 MG/DL (ref 0.72–1.25)
CREAT SERPL-MCNC: 1.74 MG/DL (ref 0.72–1.25)
CREAT/UREA NIT SERPL: 33
CREAT/UREA NIT SERPL: 33
CREAT/UREA NIT SERPL: 34
CREAT/UREA NIT SERPL: 34
CREAT/UREA NIT SERPL: 35
CREAT/UREA NIT SERPL: 35
EOSINOPHIL # BLD AUTO: 0 X10(3)/MCL (ref 0–0.9)
EOSINOPHIL NFR BLD AUTO: 0 %
ERYTHROCYTE [DISTWIDTH] IN BLOOD BY AUTOMATED COUNT: 24 % (ref 11.5–17)
GFR SERPLBLD CREATININE-BSD FMLA CKD-EPI: 42 ML/MIN/1.73/M2
GFR SERPLBLD CREATININE-BSD FMLA CKD-EPI: 43 ML/MIN/1.73/M2
GFR SERPLBLD CREATININE-BSD FMLA CKD-EPI: 43 ML/MIN/1.73/M2
GFR SERPLBLD CREATININE-BSD FMLA CKD-EPI: 44 ML/MIN/1.73/M2
GFR SERPLBLD CREATININE-BSD FMLA CKD-EPI: 46 ML/MIN/1.73/M2
GFR SERPLBLD CREATININE-BSD FMLA CKD-EPI: 49 ML/MIN/1.73/M2
GLUCOSE SERPL-MCNC: 139 MG/DL (ref 82–115)
GLUCOSE SERPL-MCNC: 156 MG/DL (ref 82–115)
GLUCOSE SERPL-MCNC: 158 MG/DL (ref 82–115)
GLUCOSE SERPL-MCNC: 162 MG/DL (ref 82–115)
GLUCOSE SERPL-MCNC: 172 MG/DL (ref 82–115)
GLUCOSE SERPL-MCNC: 202 MG/DL (ref 82–115)
HCT VFR BLD AUTO: 22.9 % (ref 42–52)
HGB BLD-MCNC: 7.7 G/DL (ref 14–18)
IMM GRANULOCYTES # BLD AUTO: 0.06 X10(3)/MCL (ref 0–0.04)
IMM GRANULOCYTES NFR BLD AUTO: 0.7 %
LACTATE SERPL-SCNC: 1 MMOL/L (ref 0.5–2.2)
LACTATE SERPL-SCNC: 2 MMOL/L (ref 0.5–2.2)
LACTATE SERPL-SCNC: 2.6 MMOL/L (ref 0.5–2.2)
LACTATE SERPL-SCNC: 3.2 MMOL/L (ref 0.5–2.2)
LACTATE SERPL-SCNC: 3.6 MMOL/L (ref 0.5–2.2)
LYMPHOCYTES # BLD AUTO: 0.51 X10(3)/MCL (ref 0.6–4.6)
LYMPHOCYTES NFR BLD AUTO: 6 %
MAGNESIUM SERPL-MCNC: 2 MG/DL (ref 1.6–2.6)
MAGNESIUM SERPL-MCNC: 2.1 MG/DL (ref 1.6–2.6)
MAGNESIUM SERPL-MCNC: 2.2 MG/DL (ref 1.6–2.6)
MCH RBC QN AUTO: 24.8 PG (ref 27–31)
MCHC RBC AUTO-ENTMCNC: 33.6 G/DL (ref 33–36)
MCV RBC AUTO: 73.6 FL (ref 80–94)
MONOCYTES # BLD AUTO: 0.77 X10(3)/MCL (ref 0.1–1.3)
MONOCYTES NFR BLD AUTO: 9 %
NEUTROPHILS # BLD AUTO: 7.2 X10(3)/MCL (ref 2.1–9.2)
NEUTROPHILS NFR BLD AUTO: 84.3 %
NRBC BLD AUTO-RTO: 1.2 %
PHOSPHATE SERPL-MCNC: 2.6 MG/DL (ref 2.3–4.7)
PHOSPHATE SERPL-MCNC: 2.8 MG/DL (ref 2.3–4.7)
PHOSPHATE SERPL-MCNC: 2.8 MG/DL (ref 2.3–4.7)
PHOSPHATE SERPL-MCNC: 3.1 MG/DL (ref 2.3–4.7)
PHOSPHATE SERPL-MCNC: 3.2 MG/DL (ref 2.3–4.7)
PHOSPHATE SERPL-MCNC: 3.3 MG/DL (ref 2.3–4.7)
PLATELET # BLD AUTO: 149 X10(3)/MCL (ref 130–400)
PLATELETS.RETICULATED NFR BLD AUTO: 6.4 % (ref 0.9–11.2)
PMV BLD AUTO: ABNORMAL FL
POCT GLUCOSE: 164 MG/DL (ref 70–110)
POCT GLUCOSE: 165 MG/DL (ref 70–110)
POCT GLUCOSE: 192 MG/DL (ref 70–110)
POCT GLUCOSE: 240 MG/DL (ref 70–110)
POTASSIUM SERPL-SCNC: 2.7 MMOL/L (ref 3.5–5.1)
POTASSIUM SERPL-SCNC: 3.1 MMOL/L (ref 3.5–5.1)
POTASSIUM SERPL-SCNC: 3.2 MMOL/L (ref 3.5–5.1)
POTASSIUM SERPL-SCNC: 3.3 MMOL/L (ref 3.5–5.1)
POTASSIUM SERPL-SCNC: 3.5 MMOL/L (ref 3.5–5.1)
POTASSIUM SERPL-SCNC: 3.6 MMOL/L (ref 3.5–5.1)
RBC # BLD AUTO: 3.11 X10(6)/MCL (ref 4.7–6.1)
SODIUM SERPL-SCNC: 131 MMOL/L (ref 136–145)
SODIUM SERPL-SCNC: 132 MMOL/L (ref 136–145)
SODIUM SERPL-SCNC: 133 MMOL/L (ref 136–145)
SODIUM SERPL-SCNC: 135 MMOL/L (ref 136–145)
SODIUM SERPL-SCNC: 135 MMOL/L (ref 136–145)
SODIUM SERPL-SCNC: 136 MMOL/L (ref 136–145)
TROPONIN I SERPL-MCNC: 0.1 NG/ML (ref 0–0.04)
TROPONIN I SERPL-MCNC: 0.12 NG/ML (ref 0–0.04)
WBC # BLD AUTO: 8.54 X10(3)/MCL (ref 4.5–11.5)

## 2025-05-07 PROCEDURE — 25000242 PHARM REV CODE 250 ALT 637 W/ HCPCS

## 2025-05-07 PROCEDURE — 94640 AIRWAY INHALATION TREATMENT: CPT

## 2025-05-07 PROCEDURE — 36415 COLL VENOUS BLD VENIPUNCTURE: CPT

## 2025-05-07 PROCEDURE — 63600175 PHARM REV CODE 636 W HCPCS: Mod: JZ,TB

## 2025-05-07 PROCEDURE — 63600175 PHARM REV CODE 636 W HCPCS

## 2025-05-07 PROCEDURE — 25000003 PHARM REV CODE 250

## 2025-05-07 PROCEDURE — 80048 BASIC METABOLIC PNL TOTAL CA: CPT

## 2025-05-07 PROCEDURE — 94761 N-INVAS EAR/PLS OXIMETRY MLT: CPT

## 2025-05-07 PROCEDURE — 85025 COMPLETE CBC W/AUTO DIFF WBC: CPT

## 2025-05-07 PROCEDURE — 83605 ASSAY OF LACTIC ACID: CPT

## 2025-05-07 PROCEDURE — 83735 ASSAY OF MAGNESIUM: CPT

## 2025-05-07 PROCEDURE — 84484 ASSAY OF TROPONIN QUANT: CPT

## 2025-05-07 PROCEDURE — 27000207 HC ISOLATION

## 2025-05-07 PROCEDURE — 84100 ASSAY OF PHOSPHORUS: CPT

## 2025-05-07 PROCEDURE — 27000221 HC OXYGEN, UP TO 24 HOURS

## 2025-05-07 PROCEDURE — 25000003 PHARM REV CODE 250: Performed by: STUDENT IN AN ORGANIZED HEALTH CARE EDUCATION/TRAINING PROGRAM

## 2025-05-07 PROCEDURE — 20000000 HC ICU ROOM

## 2025-05-07 RX ORDER — BUMETANIDE 0.25 MG/ML
2 INJECTION, SOLUTION INTRAMUSCULAR; INTRAVENOUS EVERY 12 HOURS
Status: DISCONTINUED | OUTPATIENT
Start: 2025-05-07 | End: 2025-05-07

## 2025-05-07 RX ORDER — POTASSIUM CHLORIDE 7.45 MG/ML
80 INJECTION INTRAVENOUS ONCE
Status: DISCONTINUED | OUTPATIENT
Start: 2025-05-07 | End: 2025-05-07

## 2025-05-07 RX ORDER — POTASSIUM CHLORIDE 7.45 MG/ML
10 INJECTION INTRAVENOUS
Status: DISPENSED | OUTPATIENT
Start: 2025-05-07 | End: 2025-05-07

## 2025-05-07 RX ORDER — LORAZEPAM 2 MG/ML
1 INJECTION INTRAMUSCULAR EVERY 6 HOURS PRN
Status: DISCONTINUED | OUTPATIENT
Start: 2025-05-07 | End: 2025-05-20 | Stop reason: HOSPADM

## 2025-05-07 RX ORDER — POTASSIUM CHLORIDE 7.45 MG/ML
10 INJECTION INTRAVENOUS
Status: COMPLETED | OUTPATIENT
Start: 2025-05-07 | End: 2025-05-08

## 2025-05-07 RX ORDER — BUMETANIDE 0.25 MG/ML
3 INJECTION, SOLUTION INTRAMUSCULAR; INTRAVENOUS EVERY 12 HOURS
Status: DISCONTINUED | OUTPATIENT
Start: 2025-05-07 | End: 2025-05-08

## 2025-05-07 RX ADMIN — PANTOPRAZOLE SODIUM 40 MG: 40 TABLET, DELAYED RELEASE ORAL at 09:05

## 2025-05-07 RX ADMIN — POTASSIUM CHLORIDE 10 MEQ: 7.46 INJECTION, SOLUTION INTRAVENOUS at 09:05

## 2025-05-07 RX ADMIN — HYDROCORTISONE SODIUM SUCCINATE 50 MG: 100 INJECTION, POWDER, FOR SOLUTION INTRAMUSCULAR; INTRAVENOUS at 08:05

## 2025-05-07 RX ADMIN — RIVAROXABAN 20 MG: 10 TABLET, FILM COATED ORAL at 05:05

## 2025-05-07 RX ADMIN — BUMETANIDE 3 MG: 0.25 INJECTION INTRAMUSCULAR; INTRAVENOUS at 08:05

## 2025-05-07 RX ADMIN — IPRATROPIUM BROMIDE 0.5 MG: 0.5 SOLUTION RESPIRATORY (INHALATION) at 07:05

## 2025-05-07 RX ADMIN — LEVALBUTEROL HYDROCHLORIDE 1.25 MG: 1.25 SOLUTION RESPIRATORY (INHALATION) at 07:05

## 2025-05-07 RX ADMIN — BUMETANIDE 3 MG: 0.25 INJECTION INTRAMUSCULAR; INTRAVENOUS at 09:05

## 2025-05-07 RX ADMIN — POTASSIUM CHLORIDE 10 MEQ: 7.46 INJECTION, SOLUTION INTRAVENOUS at 10:05

## 2025-05-07 RX ADMIN — ATORVASTATIN CALCIUM 80 MG: 40 TABLET, FILM COATED ORAL at 08:05

## 2025-05-07 RX ADMIN — ACETAMINOPHEN 650 MG: 325 TABLET, FILM COATED ORAL at 09:05

## 2025-05-07 RX ADMIN — HYDROCORTISONE SODIUM SUCCINATE 50 MG: 100 INJECTION, POWDER, FOR SOLUTION INTRAMUSCULAR; INTRAVENOUS at 09:05

## 2025-05-07 RX ADMIN — POTASSIUM CHLORIDE 10 MEQ: 7.46 INJECTION, SOLUTION INTRAVENOUS at 11:05

## 2025-05-07 RX ADMIN — Medication 1000 UNITS: at 08:05

## 2025-05-07 RX ADMIN — POTASSIUM CHLORIDE 10 MEQ: 7.46 INJECTION, SOLUTION INTRAVENOUS at 12:05

## 2025-05-07 RX ADMIN — PANTOPRAZOLE SODIUM 40 MG: 40 TABLET, DELAYED RELEASE ORAL at 08:05

## 2025-05-07 RX ADMIN — METOLAZONE 5 MG: 5 TABLET ORAL at 10:05

## 2025-05-07 RX ADMIN — CLOPIDOGREL BISULFATE 75 MG: 75 TABLET, FILM COATED ORAL at 08:05

## 2025-05-07 RX ADMIN — SPIRONOLACTONE 25 MG: 25 TABLET, FILM COATED ORAL at 08:05

## 2025-05-07 RX ADMIN — POTASSIUM CHLORIDE 10 MEQ: 7.46 INJECTION, SOLUTION INTRAVENOUS at 07:05

## 2025-05-07 RX ADMIN — POTASSIUM CHLORIDE 10 MEQ: 7.46 INJECTION, SOLUTION INTRAVENOUS at 08:05

## 2025-05-07 RX ADMIN — POTASSIUM CHLORIDE 10 MEQ: 7.46 INJECTION, SOLUTION INTRAVENOUS at 01:05

## 2025-05-07 RX ADMIN — FOLIC ACID 1 MG: 1 TABLET ORAL at 08:05

## 2025-05-07 RX ADMIN — POTASSIUM CHLORIDE 10 MEQ: 7.46 INJECTION, SOLUTION INTRAVENOUS at 06:05

## 2025-05-07 RX ADMIN — MICONAZOLE NITRATE: 20 POWDER TOPICAL at 09:05

## 2025-05-07 RX ADMIN — QUETIAPINE FUMARATE 100 MG: 100 TABLET ORAL at 09:05

## 2025-05-07 NOTE — PROGRESS NOTES
All Patches/Pads removed. Skin Intact. Cardiology Progress Note     Cardiology Attending: Dayanna Johnson MD  Cardiology Fellow: Vernon Cifuentes MD     Date of Admit: 4/21/2025  Date of Consult: 5/7/2025    History of Present Illness:      Ran Reyes Jr. is a 69 y.o. male with NICM-HFrEF (LVEF 20-25%; G3DD), HTN, non obstructive CAD, AF on OAC, PAD, HLD, and COPD who presented with acute on chronic decompensated heart failure now in cardiogenic shock.     Unable to obtain history from the patient. He is somnolent. He received ativan twice yesterday evening but otherwise does not appear to have received other medications which could contribute in the interval period.     Vitals reviewed. Tachycardic. Normotensive (-hypertensive for the most part for his degree of CMP).   Labs reviewed. Stable microcytic anemia. Improving sCr. Hypokalemic (2.7). Mg 2.10. LA 1.0.         Past Medical History:     Past Medical History:   Diagnosis Date    A-fib     Anticoagulant long-term use     Anxiety     Aortic aneurysm     Cataract     CHF (congestive heart failure)     Chronic atrial fibrillation     COPD (chronic obstructive pulmonary disease)     Coronary artery disease     Depression     HLD (hyperlipidemia)     Hypertension     Mitral regurgitation     PAD (peripheral artery disease)     Primary open angle glaucoma (POAG)      Surgical History:     Past Surgical History:   Procedure Laterality Date    ANGIOGRAM, CORONARY, WITH LEFT HEART CATHETERIZATION N/A 03/26/2024    Procedure: Angiogram, Coronary, with Left Heart Cath;  Surgeon: Adriano Montiel MD;  Location: [facility] CATH LAB;  Service: Cardiology;  Laterality: N/A;    ATHERECTOMY OF PERIPHERAL VESSEL Left 09/12/2022    LEFT SFA ATHERECTOMY, BALLOON ANGIOPLASTY    CATARACT EXTRACTION W/  INTRAOCULAR LENS IMPLANT Left 03/09/2023    Procedure: EXTRACTION, CATARACT, WITH IOL INSERTION;  Surgeon: Georgi Borja MD;  Location: [facility];  Service: Ophthalmology;  Laterality: Left;  19.5  mac    EGD, WITH CLOSED  BIOPSY  03/22/2024    Procedure: EGD, WITH CLOSED BIOPSY;  Surgeon: Ronald Calderon MD;  Location: [facility] ENDOSCOPY;  Service: Gastroenterology;;    ESOPHAGOGASTRODUODENOSCOPY N/A 03/22/2024    Procedure: EGD;  Surgeon: Ronald Calderon MD;  Location: [facility] ENDOSCOPY;  Service: Gastroenterology;  Laterality: N/A;    Heart Stent N/A     > 10yrs. AGO    INCISION AND DRAINAGE N/A 03/18/2024    Procedure: Incision and Drainage;  Surgeon: Fahad Rivas MD;  Location: [facility] OR;  Service: Urology;  Laterality: N/A;  I&D SCROTAL ABSCESS    INSERTION OF STENT INTO PERIPHERAL VESSEL Right 10/17/2022    RIGHT SFA ATHERECTOMY, BALLOON ANGIOPLASTY, STENT; RIGHT ANTERIOR TIBIAL ARTERY ATHERECTOMY, BALLOON ANGIOPLASTY    ORCHIECTOMY Left 03/18/2024    Procedure: ORCHIECTOMY;  Surgeon: Fahad Rivas MD;  Location: [facility];  Service: Urology;  Laterality: Left;     Allergies:   Review of patient's allergies indicates:  No Known Allergies  Family History:     Family History   Problem Relation Name Age of Onset    Cancer Mother      Hypertension Mother      Heart failure Father      Stroke Father      Hypertension Father      No Known Problems Sister       Social History:   Social History[1]  Review of Systems:     Unable to obtain. Somnolent.   Medications:     Home Medications:  Prior to Admission medications    Medication Sig Start Date End Date Taking? Authorizing Provider   aspirin (ECOTRIN) 81 MG EC tablet Take 1 tablet by mouth once daily. 3/14/25  Yes Provider, Historical   olopatadine (PATANOL) 0.1 % ophthalmic solution Apply to eye. 2/7/25  Yes Provider, Historical   sacubitriL-valsartan (ENTRESTO) 49-51 mg per tablet Take 1 tablet by mouth once daily. 2/12/25  Yes Provider, Historical   urea (CARMOL) 40 % Crea Apply topically. 2/13/25  Yes Provider, Historical   varenicline tartrate (CHANTIX) 1 mg Tab Take 0.5 mg by mouth. 2/13/25  Yes Provider, Historical   acetaminophen 325 mg Cap Take  650 mg by mouth 2 (two) times daily as needed.    Provider, Historical   ALBUTEROL INHL Inhale 2 puffs into the lungs every 6 (six) hours as needed.    Provider, Historical   albuterol-ipratropium (DUO-NEB) 2.5 mg-0.5 mg/3 mL nebulizer solution Take 3 mLs by nebulization every 6 (six) hours as needed for Wheezing. Rescue 12/9/24   Diana Hassan MD   atorvastatin (LIPITOR) 80 MG tablet Take 1 tablet (80 mg total) by mouth once daily. 3/4/25 3/4/26  Marino Marquez DO   BREZTRI AEROSPHERE 160-9-4.8 mcg/actuation HFAA Inhale 2 puffs into the lungs 2 (two) times daily. 1/7/25   Provider, Historical   cetirizine (ZYRTEC) 10 MG tablet Take 1 tablet (10 mg total) by mouth once daily. 8/8/24 8/3/25  Abiodun Recinos DO   clopidogreL (PLAVIX) 75 mg tablet Take 1 tablet (75 mg total) by mouth once daily. 3/4/25   Marino Marquez DO   folic acid (FOLVITE) 1 MG tablet Take 1 tablet (1 mg total) by mouth once daily. 4/9/24 4/9/25  Tha Edmond MD   furosemide (LASIX) 40 MG tablet Take 1 tablet (40 mg total) by mouth 2 (two) times a day. 3/4/25   Marino Marquez DO   gabapentin (NEURONTIN) 300 MG capsule Take 300 mg by mouth 3 (three) times daily.    Provider, Historical   metoprolol succinate (TOPROL-XL) 25 MG 24 hr tablet Take 0.5 tablets (12.5 mg total) by mouth once daily. 3/4/25 3/4/26  Marino Marquez DO   nicotine (NICODERM CQ) 14 mg/24 hr Place 1 patch onto the skin daily as needed. 3/4/25   Marino Marquez DO   nicotine (NICODERM CQ) 21 mg/24 hr Place 1 patch onto the skin once daily. 3/20/25   Heaven Page MD   nitrofurantoin, macrocrystal-monohydrate, (MACROBID) 100 MG capsule Take 1 capsule (100 mg total) by mouth 2 (two) times daily. 3/4/25   Marino Marquez, DO   pantoprazole (PROTONIX) 40 MG tablet Take 1 tablet (40 mg total) by mouth 2 (two) times a day. 9/23/24   Abiodun Recinos,    potassium chloride SA (K-DUR,KLOR-CON) 20 MEQ tablet Take 1 tablet (20 mEq total) by mouth 2 (two) times daily. 1/28/25 1/28/26   Vernon Cifuentes MD   rivaroxaban (XARELTO) 20 mg Tab Take 1 tablet (20 mg total) by mouth Daily. 3/4/25   Marino Marquez DO   sertraline (ZOLOFT) 100 MG tablet Take 100 mg by mouth once daily.    Provider, Historical   vitamin D (VITAMIN D3) 1000 units Tab Take 1,000 Units by mouth once daily.    Provider, Historical       Current/Inpatient Medications:  Infusions:   0.9% NaCl   Intravenous Continuous   Stopped at 05/04/25 2254    DOBUTamine IV infusion (non-titrating)  2.5 mcg/kg/min Intravenous Continuous 6.9 mL/hr at 05/06/25 1935 5 mcg/kg/min at 05/06/25 1935     Scheduled:   atorvastatin  80 mg Oral Daily    bumetanide  3 mg Intravenous Q12H    clopidogreL  75 mg Oral Daily    folic acid  1 mg Oral Daily    hydrocortisone sodium succinate  50 mg Intravenous Q12H    ipratropium  0.5 mg Nebulization Q12H    levalbuterol  1.25 mg Nebulization Q12H    metOLazone  5 mg Oral Daily    miconazole NITRATE 2 %   Topical (Top) BID    pantoprazole  40 mg Oral BID    potassium chloride in water  10 mEq Intravenous Q1H    QUEtiapine  100 mg Oral QHS    rivaroxaban  20 mg Oral Daily    spironolactone  25 mg Oral Daily    vitamin D  1,000 Units Oral Daily     PRN:    Current Facility-Administered Medications:     acetaminophen, 650 mg, Oral, Q4H PRN    aluminum-magnesium hydroxide, 30 mL, Oral, BID PRN    dextrose 50%, 12.5 g, Intravenous, PRN    dextrose 50%, 25 g, Intravenous, PRN    glucagon (human recombinant), 1 mg, Intramuscular, PRN    glucose, 16 g, Oral, PRN    glucose, 24 g, Oral, PRN    hydrALAZINE, 10 mg, Intravenous, Q4H PRN    hydrocortisone, , Topical (Top), TID PRN    hydrOXYzine pamoate, 25 mg, Oral, Q6H PRN    insulin aspart U-100, 0-5 Units, Subcutaneous, QID (AC + HS) PRN    labetalol, 10 mg, Intravenous, Q4H PRN    lorazepam, 2 mg, Intravenous, Q4H PRN    LORazepam, 0.5 mg, Oral, Q8H PRN    melatonin, 6 mg, Oral, Nightly PRN    naloxone, 0.02 mg, Intravenous, PRN    nicotine, 1 patch, Transdermal, Daily  PRN    polyethylene glycol, 17 g, Oral, Daily PRN    prochlorperazine, 5 mg, Intravenous, Q6H PRN    senna-docusate, 1 tablet, Oral, BID PRN    simethicone, 1 tablet, Oral, TID PRN    sodium chloride 0.9%, 10 mL, Intravenous, PRN    traZODone, 25 mg, Oral, Nightly PRN    Objective:     24-hour Vitals:   Temp:  [97.5 °F (36.4 °C)-98.3 °F (36.8 °C)] 97.9 °F (36.6 °C)  Pulse:  [] 100  Resp:  [16-37] 24  SpO2:  [90 %-99 %] 97 %  BP: ()/(69-94) 97/75    Vitals: BP 97/75   Pulse 100   Temp 97.9 °F (36.6 °C)   Resp (!) 24   Ht 6' (1.829 m)   Wt 88.6 kg (195 lb 5.2 oz)   SpO2 97%   BMI 26.49 kg/m²     Intake/Output Summary (Last 24 hours) at 5/7/2025 0654  Last data filed at 5/7/2025 0645  Gross per 24 hour   Intake 1213.44 ml   Output 7380 ml   Net -6166.56 ml       Physical Exam  Vitals reviewed.   Constitutional:       General: He is not in acute distress.     Appearance: Normal appearance. He is normal weight. He is not ill-appearing.   HENT:      Head: Normocephalic and atraumatic.      Right Ear: External ear normal.      Left Ear: External ear normal.      Nose: Nose normal.   Eyes:      Conjunctiva/sclera: Conjunctivae normal.   Neck:      Comments: JVD  Cardiovascular:      Rate and Rhythm: Normal rate. Rhythm irregular.      Pulses: Normal pulses.      Heart sounds: Murmur heard.   Pulmonary:      Effort: Pulmonary effort is normal. No respiratory distress.      Breath sounds: No stridor. No wheezing, rhonchi or rales.      Comments: 3L NC.   Chest:      Chest wall: No tenderness.   Abdominal:      General: Bowel sounds are normal. There is no distension.      Palpations: Abdomen is soft.   Musculoskeletal:      Cervical back: Neck supple.      Right lower leg: Edema present.      Left lower leg: Edema present.      Comments: Improved LE edema.    Skin:     General: Skin is warm and dry.      Capillary Refill: Capillary refill takes less than 2 seconds.   Neurological:      Mental Status: He is  alert.   Psychiatric:         Mood and Affect: Mood normal.         Behavior: Behavior normal.        Labs:   I have reviewed the following labs below:    Recent Labs   Lab 05/03/25  0542 05/03/25  1230 05/04/25  0215 05/04/25  1409 05/05/25  0206 05/05/25  0841 05/06/25  0318 05/06/25  0726 05/06/25  1823 05/07/25  0013 05/07/25  0426   WBC 7.41  --  8.47  --  11.14  --  10.32  --   --   --  8.54   HGB 8.2*  --  7.7*  --  7.8*  --  7.6*  --   --   --  7.7*   HCT 25.8*  --  23.6*  --  24.2*  --  22.8*  --   --   --  22.9*     --  147  --  148  --  145  --   --   --  149   *   < > 131*   < > 131*   < > 134*   < > 135* 135* 135*   K 4.3   < > 4.0   < > 4.3   < > 3.7   < > 3.3* 3.5 2.7*   CL 95*   < > 93*   < > 92*   < > 92*   < > 87* 86* 83*   CO2 16*   < > 21*   < > 23   < > 23   < > 30 30 35*   BUN 36.3*   < > 46.5*   < > 51.9*   < > 56.7*   < > 55.0* 56.3* 55.4*   CREATININE 1.91*   < > 1.97*   < > 1.84*   < > 1.89*   < > 1.65* 1.71* 1.64*      < > 210*   < > 165*   < > 147*   < > 144* 158* 162*   CALCIUM 9.9   < > 9.4   < > 9.1   < > 9.2   < > 9.4 9.4 9.3   MG 2.10  --  2.40  --  2.30   < > 2.20   < > 2.20 2.20 2.10   PHOS 4.0  --  3.6  --  3.4   < > 3.1   < > 2.5 2.6 3.3   PROT 8.0*  --  8.0*  --  8.0*  --   --   --   --   --   --    ALBUMIN 3.4  --  3.4  --  3.4  --   --   --   --   --   --    BILITOT 5.4*  --  5.0*  --  4.7*  --   --   --   --   --   --    AST 36  --  45  --  62*  --   --   --   --   --   --    ALT 18  --  20  --  25  --   --   --   --   --   --    ALKPHOS 213*  --  231*  --  220*  --   --   --   --   --   --    TROPONINI  --   --   --    < >  --    < >  --    < > 0.106* 0.120* 0.095*    < > = values in this interval not displayed.     Cardiovascular Studies/Imaging:   I have reviewed the following studies below:  TTE:   Summary  Show Result Comparison     Left Ventricle: The left ventricle is severely dilated. Severely increased ventricular mass. Mildly increased wall  thickness. There is severe eccentric hypertrophy. Severe global hypokinesis present. There is severely reduced systolic function. Quantitated ejection fraction is 22%. Grade III diastolic dysfunction.    Right Ventricle: The right ventricle has moderate enlargement. Systolic function is moderately reduced.    Left Atrium: Left atrium is severely dilated measuring 92ml/m2.    Right Atrium: Right atrium is severely dilated.    Aortic Valve: The aortic valve is a trileaflet valve. There is mild aortic valve sclerosis. There is no stenosis. There is no significant regurgitation.    Mitral Valve: The mitral valve is structurally normal. There is no stenosis. There is severe central regurgitation.    Tricuspid Valve: The tricuspid valve is structurally normal. There is moderate regurgitation.    Pulmonary Artery: There is moderate pulmonary hypertension. The estimated pulmonary artery systolic pressure is 59 mmHg.    IVC/SVC: Elevated venous pressure at 15 mmHg.    Pericardium: There is no pericardial effusion.    LHC/Cors: 3/26/2024  Coronary findings:     Dominance: right   Left main:  10-20% calcified distal left main disease  Left anterior descending artery:  50% calcified proximal stenosis  Circumflex artery:  Luminal irregularities  Right coronary artery:  Luminal irregularities    Imaging:   See imaging section for details.     Assessment:     Ran Reyes Jr. is a 69 y.o. male with NICM-HFrEF (LVEF 20-25%; G3DD), HTN, non obstructive CAD, AF on OAC, PAD, HLD, and COPD who presented with acute on chronic decompensated heart failure in cardiogenic shock now resolved.     Plan/Recommendations:     Acute on Chronic NICM-HFrEF with Resolved CS and Severe Mitral Regurgitation   -Continue fixed dose, decrease to Dobutamine 2.5 mcg/kg/min. Plan to wean off today.   -Continue diuresis. Transition to bolus Bumex 3 mg BID. Monitor UOP.     -Continue to hold BB (on inotropic support). No SGLT2i (has history of  Claudine's). Will hold ARNi for now (improving acute renal injury - will wait until closer to baseline). Continue Aldactone 25 mg daily (acute renal injury likely renovascular congestion - sequential nephron blockade with decongestion that should improve acute renal injury). If blood pressure control is needed can use Bidil.   -Reassess LA 2 hours after decreasing dose of Dobutamine.     Atrial Fibrillation  -Hold BB for now given above.   -Continue anticoagulation for CVA risk reduction.     Hypertension  -Aldactone for GDMT and sequential nephron blockade.   -Can use Bidil if needed until sCr improve to add other first line agents (ARNi).     Non Obstructive Epicardial Coronary Artery Disease  Peripheral Artery Disease  -SAPT and high intensity statin therapy.     Plan  -Wean Dobutamine. Monitor clinically - repeat LA 2 hours after dose adjustment. Transition to bolus Bumex. Replete electrolytes. Bidil if needed for hypertension. Remainder as above.        Vernon Cifuentes MD  hospitals Chief Cardiology Fellow, PGY-6  [facility]                [1]   Social History  Tobacco Use    Smoking status: Every Day     Current packs/day: 0.50     Average packs/day: 0.8 packs/day for 50.3 years (40.9 ttl pk-yrs)     Types: Cigarettes     Start date: 1975     Passive exposure: Current    Smokeless tobacco: Never    Tobacco comments:     States smoke 2-3 cigarettes per day   Substance Use Topics    Alcohol use: Yes     Alcohol/week: 3.0 standard drinks of alcohol     Types: 3 Cans of beer per week     Comment: socially    Drug use: Not Currently     Frequency: 1.0 times per week     Types: Marijuana     Comment: no use in over 1 year

## 2025-05-07 NOTE — PROGRESS NOTES
[facility] - [address] [place] [address] Telemetry  Pulmonary Critical Care Note    Patient Name: Ran Reyes Jr.  MRN: [id number]  Admission Date: 4/21/2025  Hospital Length of Stay: 15 days  Code Status: Full Code  Attending Provider: Trisha German MD  Primary Care Provider: Abiodun Recinos DO     Subjective:     HPI:   Ran Reyes Jr. is a 69 y.o. 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 Patient reports he can only ambulate about 20-30 feet before having to stop due to claudication pain which is chronic and unchanged compared to baseline. Since running out of his diuretic medications approximately 5 days ago he has noted progressively worsening lower extremity swelling causing leg heaviness and weakness exacerbating difficulty ambulating.  He also notes acute on chronic cough productive of a brownish sputum without hemoptysis.  Denies fever, chills, sweats.  Denies chest pain.  Endorses shortness of breath at rest and with ambulation but unchanged compared to baseline.  Denies abdominal pain, nausea, vomiting, constipation, diarrhea.  Denies increased or decreased urinary frequency, dysuria, or hematuria.  Sleeps with 2 pillows at night due to baseline orthopnea. Has not had to increase number of pillows or change sleeping habits. Wears 2L NC home oxygen at baseline for hx of COPD. Denies having increased oxygen requirements prior to ED presentation.     ED course:  Vital signs stable.  Oxygenation  stable on 2 L nasal cannula.  CBC shows microcytic anemic (HGB 9.0; MCV 77).  CMP shows hyponatremia (Na 128), acidemia (CO2 14), elevated renal indices (BUN 25; creatinine 1.93), elevated alkaline phosphatase (). Troponin WNL.  BNP 26 39.5.  UDS negative.  EKG showed atrial fibrillation with PVCs.  Chest x-ray difficult to interpret due to overlying medical devices however appears to show cardiomegaly with bilateral pulmonary vascular congestion with question of bilateral pleural effusions. He was admitted for acute CHF exacerbation and cardiorenal syndrome.        Hospital Course/Significant events:  4/23/25: Admit to ICU, started on pressors and inotropes for cardiogenic / septic shock   5/2/25: Downgraded to    5/3/25: Re-upgraded to ICU 2/2 worsening lactate and agonal breathing     24 Hour Interval History:  Mild agitation overnight and patient received IV Ativan for it. Patient had good diuresis with total 7.3 L UOP and net negative 8L. Vitals with stable BP and tachycardia noted. Patient remains on 3-4L O2 via NC. A&O x 3 today. CBC with stable microcytic anemia. CMP with hypokalemia of 2.7, improved renal indices BUN/Cr at 55.4/1.64. Troponin has down trend to 0.095 from peak 0.120. Patient voices no acute chest pain or increased shortness of breath. Incident of booth tugging last night, booth bag with red tinged urine noted.     Past Medical History:   Diagnosis Date    A-fib     Anticoagulant long-term use     Anxiety     Aortic aneurysm     Cataract     CHF (congestive heart failure)     Chronic atrial fibrillation     COPD (chronic obstructive pulmonary disease)     Coronary artery disease     Depression     HLD (hyperlipidemia)     Hypertension     Mitral regurgitation     PAD (peripheral artery disease)     Primary open angle glaucoma (POAG)        Past Surgical History:   Procedure Laterality Date    ANGIOGRAM, CORONARY, WITH LEFT HEART CATHETERIZATION N/A 03/26/2024    Procedure: Angiogram,  Coronary, with Left Heart Cath;  Surgeon: Adriano Montiel MD;  Location: [facility] CATH LAB;  Service: Cardiology;  Laterality: N/A;    ATHERECTOMY OF PERIPHERAL VESSEL Left 09/12/2022    LEFT SFA ATHERECTOMY, BALLOON ANGIOPLASTY    CATARACT EXTRACTION W/  INTRAOCULAR LENS IMPLANT Left 03/09/2023    Procedure: EXTRACTION, CATARACT, WITH IOL INSERTION;  Surgeon: Georgi Borja MD;  Location: [facility];  Service: Ophthalmology;  Laterality: Left;  19.5  mac    EGD, WITH CLOSED BIOPSY  03/22/2024    Procedure: EGD, WITH CLOSED BIOPSY;  Surgeon: Ronald Calderon MD;  Location: [facility] ENDOSCOPY;  Service: Gastroenterology;;    ESOPHAGOGASTRODUODENOSCOPY N/A 03/22/2024    Procedure: EGD;  Surgeon: Ronald Calderon MD;  Location: [facility] ENDOSCOPY;  Service: Gastroenterology;  Laterality: N/A;    Heart Stent N/A     > 10yrs. AGO    INCISION AND DRAINAGE N/A 03/18/2024    Procedure: Incision and Drainage;  Surgeon: Fahad Rivas MD;  Location: [facility];  Service: Urology;  Laterality: N/A;  I&D SCROTAL ABSCESS    INSERTION OF STENT INTO PERIPHERAL VESSEL Right 10/17/2022    RIGHT SFA ATHERECTOMY, BALLOON ANGIOPLASTY, STENT; RIGHT ANTERIOR TIBIAL ARTERY ATHERECTOMY, BALLOON ANGIOPLASTY    ORCHIECTOMY Left 03/18/2024    Procedure: ORCHIECTOMY;  Surgeon: Fahad Rivas MD;  Location: [facility];  Service: Urology;  Laterality: Left;       Social History[1]    Current Outpatient Medications   Medication Instructions    acetaminophen 650 mg, 2 times daily PRN    ALBUTEROL INHL 2 puffs, Every 6 hours PRN    albuterol-ipratropium (DUO-NEB) 2.5 mg-0.5 mg/3 mL nebulizer solution 3 mLs, Nebulization, Every 6 hours PRN, Rescue    aspirin (ECOTRIN) 81 MG EC tablet 1 tablet, Daily    atorvastatin (LIPITOR) 80 mg, Oral, Daily    BREZTRI AEROSPHERE 160-9-4.8 mcg/actuation HFAA 2 puffs, 2 times daily    cetirizine (ZYRTEC) 10 mg, Oral, Daily    clopidogreL (PLAVIX) 75 mg, Oral, Daily    folic acid (FOLVITE) 1 mg,  Oral, Daily    furosemide (LASIX) 40 mg, Oral, 2 times daily    gabapentin (NEURONTIN) 300 mg, Oral, 3 times daily    metoprolol succinate (TOPROL-XL) 12.5 mg, Oral, Daily    nicotine (NICODERM CQ) 14 mg/24 hr 1 patch, Transdermal, Daily PRN    nicotine (NICODERM CQ) 21 mg/24 hr 1 patch, Transdermal, Daily    nitrofurantoin, macrocrystal-monohydrate, (MACROBID) 100 MG capsule 100 mg, Oral, 2 times daily    olopatadine (PATANOL) 0.1 % ophthalmic solution Apply to eye.    pantoprazole (PROTONIX) 40 mg, Oral, 2 times daily    potassium chloride SA (K-DUR,KLOR-CON) 20 MEQ tablet 20 mEq, Oral, 2 times daily    rivaroxaban (XARELTO) 20 mg, Oral, Daily    sacubitriL-valsartan (ENTRESTO) 49-51 mg per tablet 1 tablet, Daily    sertraline (ZOLOFT) 100 mg, Oral, Daily    urea (CARMOL) 40 % Crea Apply topically.    varenicline tartrate (CHANTIX) 0.5 mg    vitamin D (VITAMIN D3) 1,000 Units, Daily     Current Inpatient Medications   atorvastatin  80 mg Oral Daily    bumetanide  3 mg Intravenous Q12H    clopidogreL  75 mg Oral Daily    folic acid  1 mg Oral Daily    hydrocortisone sodium succinate  50 mg Intravenous Q12H    ipratropium  0.5 mg Nebulization Q12H    levalbuterol  1.25 mg Nebulization Q12H    metOLazone  5 mg Oral Daily    miconazole NITRATE 2 %   Topical (Top) BID    pantoprazole  40 mg Oral BID    potassium chloride in water  10 mEq Intravenous Q1H    QUEtiapine  100 mg Oral QHS    rivaroxaban  20 mg Oral Daily    spironolactone  25 mg Oral Daily    vitamin D  1,000 Units Oral Daily     Current Intravenous Infusions   0.9% NaCl   Intravenous Continuous   Stopped at 05/04/25 9440    DOBUTamine IV infusion (non-titrating)  2.5 mcg/kg/min Intravenous Continuous 3.4 mL/hr at 05/07/25 0640 2.5 mcg/kg/min at 05/07/25 0640     ROS: neg unless stated above        Objective:     Intake/Output Summary (Last 24 hours) at 5/7/2025 0755  Last data filed at 5/7/2025 0645  Gross per 24 hour   Intake 1204.94 ml   Output 7380 ml    Net -6175.06 ml     Vital Signs (Most Recent):  Temp: 97.9 °F (36.6 °C) (05/07/25 0302)  Pulse: (!) 119 (05/07/25 0711)  Resp: 19 (05/07/25 0711)  BP: 97/75 (05/07/25 0600)  SpO2: 97 % (05/07/25 0711)  Body mass index is 26.49 kg/m².  Weight: 88.6 kg (195 lb 5.2 oz) Vital Signs (24h Range):  Temp:  [97.5 °F (36.4 °C)-98.3 °F (36.8 °C)] 97.9 °F (36.6 °C)  Pulse:  [] 119  Resp:  [16-37] 19  SpO2:  [90 %-99 %] 97 %  BP: ()/(69-94) 97/75     Physical Exam:  General: Mild distress, on 4L supplemental oxygen  HEENT: NC/AT; PERRL; scleral icterus, nasal and oral mucosa moist and clear  Pulm: + rales bibasilar and mild expiratory wheezing noted.  CV: S1, S2 irregular irregular w/o murmurs or gallops; JVD present with +HJR, 1-2+ edema noted of BLE  GI: Bowel sound present in all quadrants, abdomen distended with pain on deep palpation around lower quadrant  MSK: Full ROM of all extremities and spine w/o limitation or discomfort  Derm: No rashes, abnormal bruising, or skin lesions  Neuro: AAOx3; motor/sensory function intact  Psych: Anxious and agitated    Lines/Drains/Airways       Peripherally Inserted Central Catheter Line  Duration             PICC Triple Lumen 04/24/25 0942 right brachial 12 days              Drain  Duration                  Urethral Catheter 05/05/25 0808 Silicone 16 Fr. 1 day                  Significant Labs:  Lab Results   Component Value Date    WBC 8.54 05/07/2025    HGB 7.7 (L) 05/07/2025    HCT 22.9 (L) 05/07/2025    MCV 73.6 (L) 05/07/2025     05/07/2025       BMP  Lab Results   Component Value Date     (L) 05/07/2025    K 2.7 (LL) 05/07/2025    CO2 35 (H) 05/07/2025    BUN 55.4 (H) 05/07/2025    CREATININE 1.64 (H) 05/07/2025    CALCIUM 9.3 05/07/2025    AGAP 17.0 05/07/2025    EGFRNONAA 88 (L) 12/06/2021     ABG  Recent Labs   Lab 05/06/25  0754   PH 7.550*   PO2 40.0*   PCO2 38.0   HCO3 33.2*     Mechanical Ventilation Support:  Oxygen Concentration (%): 36  (05/07/25 9333)    Significant Imaging:  I have reviewed the pertinent imaging within the past 24 hours.    Assessment/Plan:     Assessment  Cardiogenic Shock  Lactic Acidosis   HFrEF (22%, G3DD), Pulmonary Hypertension  NOCAD, NICMO  NSTEMI likely demand  Chronic persistent Afib  Hx of VT arrest (has life vest)  ELIZABETH   Adrenal Insufficiency   Low cortisol on ata stim test and normal plast ACTH level  COPD   Microcytic Anemia   Low TSAT 7%  Prolonged QTC  Groin itch  Anxiety/Insomnia     Plan  - Continue ongoing ICU level of care  - Appreciate cardiology recommendations  - Decrease Dobutamine to 2.5 mcg/min  - Discontinue Bumex infusion and start Bumex 3 mg BID  - Continue Metolazone 5 mg once daily  - Ordered LA for 0930  - Discontinue ASA, Continue plavix and statin  - Continue Xarelto 20 mg daily  - Hold beta blocker until patient euvolemic again; Midodrine discontinued.  - Avoid nephrotoxins   - Continue Lev albuterol and ipratropium q12h  - Strict I&O and daily weights  - LDSSI added today  - Continue Miconazole powder BID   - Continue Seroquel to 100 mg qhs; Ativan prn ordered  - Continue IV Hydrocortisone 50 mg q12h     CODE STATUS: Full Code  Access: PICC and PIV  Antibiotics: none  Diet: Cardiac  DVT Prophylaxis: Xarelto 20 mg  GI Prophylaxis: PPI  Fluids: None     32 minutes of critical care was time spent personally by me on the following activities: development of treatment plan with patient or surrogate and bedside caregivers, discussions with consultants, evaluation of patient's response to treatment, examination of patient, ordering and performing treatments and interventions, ordering and review of laboratory studies, ordering and review of radiographic studies, pulse oximetry, re-evaluation of patient's condition.  This critical care time did not overlap with that of any other provider or involve time for any procedures.     Valerio Shrestha MD PGY-2  Pulmonary Critical Care Medicine  [facility] Med Surg Telemetry         [1]   Social History  Socioeconomic History    Marital status: Single   Tobacco Use    Smoking status: Every Day     Current packs/day: 0.50     Average packs/day: 0.8 packs/day for 50.3 years (40.9 ttl pk-yrs)     Types: Cigarettes     Start date: 1975     Passive exposure: Current    Smokeless tobacco: Never    Tobacco comments:     States smoke 2-3 cigarettes per day   Substance and Sexual Activity    Alcohol use: Yes     Alcohol/week: 3.0 standard drinks of alcohol     Types: 3 Cans of beer per week     Comment: socially    Drug use: Not Currently     Frequency: 1.0 times per week     Types: Marijuana     Comment: no use in over 1 year    Sexual activity: Not Currently     Partners: Female     Social Drivers of Health     Financial Resource Strain: Low Risk  (4/23/2025)    Overall Financial Resource Strain (CARDIA)     Difficulty of Paying Living Expenses: Not hard at all   Food Insecurity: No Food Insecurity (4/23/2025)    Hunger Vital Sign     Worried About Running Out of Food in the Last Year: Never true     Ran Out of Food in the Last Year: Never true   Transportation Needs: No Transportation Needs (4/23/2025)    PRAPARE - Transportation     Lack of Transportation (Medical): No     Lack of Transportation (Non-Medical): No   Physical Activity: Inactive (12/17/2024)    Exercise Vital Sign     Days of Exercise per Week: 0 days     Minutes of Exercise per Session: 0 min   Stress: No Stress Concern Present (4/23/2025)    Citizen of [place] [employer] - Occupational Stress Questionnaire     Feeling of Stress : Not at all   Housing Stability: Low Risk  (4/23/2025)    Housing Stability Vital Sign     Unable to Pay for Housing in the Last Year: No     Homeless in the Last Year: No

## 2025-05-07 NOTE — PLAN OF CARE
Problem: Skin Injury Risk Increased  Goal: Skin Health and Integrity  Outcome: Progressing     Problem: Adult Inpatient Plan of Care  Goal: Plan of Care Review  Outcome: Progressing  Goal: Patient-Specific Goal (Individualized)  Outcome: Progressing  Goal: Absence of Hospital-Acquired Illness or Injury  Outcome: Progressing  Goal: Optimal Comfort and Wellbeing  Outcome: Progressing     Problem: COPD (Chronic Obstructive Pulmonary Disease)  Goal: Optimal Chronic Illness Coping  Outcome: Progressing  Goal: Optimal Level of Functional [place]  Outcome: Progressing  Goal: Absence of Infection Signs and Symptoms  Outcome: Progressing  Goal: Improved Oral Intake  Outcome: Progressing     Problem: Heart Failure  Goal: Improved Oral Intake  Outcome: Progressing     Problem: Fall Injury Risk  Goal: Absence of Fall and Fall-Related Injury  Outcome: Progressing     Problem: Wound  Goal: Optimal Coping  Outcome: Progressing  Goal: Optimal Functional Ability  Outcome: Progressing  Goal: Absence of Infection Signs and Symptoms  Outcome: Progressing  Goal: Improved Oral Intake  Outcome: Progressing  Goal: Optimal Pain Control and Function  Outcome: Progressing  Goal: Skin Health and Integrity  Outcome: Progressing  Goal: Optimal Wound Healing  Outcome: Progressing     Problem: Acute Kidney Injury/Impairment  Goal: Fluid and Electrolyte Balance  Outcome: Progressing  Goal: Improved Oral Intake  Outcome: Progressing     Problem: Infection  Goal: Absence of Infection Signs and Symptoms  Outcome: Progressing     Problem: Heart Failure  Goal: Optimal Coping  Outcome: Not Progressing

## 2025-05-08 LAB
A-ADO2 BLOOD GAS (OHS): 97 MMHG
ALLENS TEST BLOOD GAS (OHS): YES
ANION GAP SERPL CALC-SCNC: 16 MEQ/L
ANION GAP SERPL CALC-SCNC: 17 MEQ/L
ANION GAP SERPL CALC-SCNC: 18 MEQ/L
BASE EXCESS BLD CALC-SCNC: 23.4 MMOL/L (ref -2–2)
BASOPHILS # BLD AUTO: 0 X10(3)/MCL
BASOPHILS NFR BLD AUTO: 0 %
BLOOD GAS SAMPLE TYPE (OHS): ABNORMAL
BNP BLD-MCNC: 3765.7 PG/ML
BUN SERPL-MCNC: 57.5 MG/DL (ref 8.4–25.7)
BUN SERPL-MCNC: 59.9 MG/DL (ref 8.4–25.7)
BUN SERPL-MCNC: 60 MG/DL (ref 8.4–25.7)
BUN SERPL-MCNC: 60.7 MG/DL (ref 8.4–25.7)
BUN SERPL-MCNC: 61.7 MG/DL (ref 8.4–25.7)
BUN SERPL-MCNC: 61.8 MG/DL (ref 8.4–25.7)
CALCIUM SERPL-MCNC: 9.3 MG/DL (ref 8.8–10)
CALCIUM SERPL-MCNC: 9.5 MG/DL (ref 8.8–10)
CALCIUM SERPL-MCNC: 9.6 MG/DL (ref 8.8–10)
CHLORIDE SERPL-SCNC: 77 MMOL/L (ref 98–107)
CHLORIDE SERPL-SCNC: 78 MMOL/L (ref 98–107)
CHLORIDE SERPL-SCNC: 79 MMOL/L (ref 98–107)
CO2 BLDA-SCNC: 48.6 MMOL/L (ref 22–26)
CO2 SERPL-SCNC: 35 MMOL/L (ref 23–31)
CO2 SERPL-SCNC: 36 MMOL/L (ref 23–31)
CO2 SERPL-SCNC: 37 MMOL/L (ref 23–31)
CO2 SERPL-SCNC: 38 MMOL/L (ref 23–31)
COHGB MFR BLDA: 3.2 % (ref 0.5–1.5)
CREAT SERPL-MCNC: 1.5 MG/DL (ref 0.72–1.25)
CREAT SERPL-MCNC: 1.6 MG/DL (ref 0.72–1.25)
CREAT SERPL-MCNC: 1.61 MG/DL (ref 0.72–1.25)
CREAT SERPL-MCNC: 1.61 MG/DL (ref 0.72–1.25)
CREAT SERPL-MCNC: 1.63 MG/DL (ref 0.72–1.25)
CREAT SERPL-MCNC: 1.63 MG/DL (ref 0.72–1.25)
CREAT/UREA NIT SERPL: 37
CREAT/UREA NIT SERPL: 37
CREAT/UREA NIT SERPL: 38
DRAWN BY BLOOD GAS (OHS): ABNORMAL
EOSINOPHIL # BLD AUTO: 0 X10(3)/MCL (ref 0–0.9)
EOSINOPHIL NFR BLD AUTO: 0 %
ERYTHROCYTE [DISTWIDTH] IN BLOOD BY AUTOMATED COUNT: 24.5 % (ref 11.5–17)
GAS PNL BLD: 169 MMHG
GFR SERPLBLD CREATININE-BSD FMLA CKD-EPI: 46 ML/MIN/1.73/M2
GFR SERPLBLD CREATININE-BSD FMLA CKD-EPI: 46 ML/MIN/1.73/M2
GFR SERPLBLD CREATININE-BSD FMLA CKD-EPI: 47 ML/MIN/1.73/M2
GFR SERPLBLD CREATININE-BSD FMLA CKD-EPI: 51 ML/MIN/1.73/M2
GLUCOSE SERPL-MCNC: 142 MG/DL (ref 82–115)
GLUCOSE SERPL-MCNC: 149 MG/DL (ref 82–115)
GLUCOSE SERPL-MCNC: 158 MG/DL (ref 82–115)
GLUCOSE SERPL-MCNC: 162 MG/DL (ref 82–115)
GLUCOSE SERPL-MCNC: 182 MG/DL (ref 82–115)
GLUCOSE SERPL-MCNC: 202 MG/DL (ref 82–115)
HCO3 BLDA-SCNC: 47.2 MMOL/L (ref 22–26)
HCT VFR BLD AUTO: 23.2 % (ref 42–52)
HGB BLD-MCNC: 7.6 G/DL (ref 14–18)
HOLD SPECIMEN: NORMAL
HOLD SPECIMEN: NORMAL
IMM GRANULOCYTES # BLD AUTO: 0.04 X10(3)/MCL (ref 0–0.04)
IMM GRANULOCYTES NFR BLD AUTO: 0.4 %
INHALED O2 CONCENTRATION: 32 %
LACTATE SERPL-SCNC: 0.8 MMOL/L (ref 0.5–2.2)
LACTATE SERPL-SCNC: 1.3 MMOL/L (ref 0.5–2.2)
LACTATE SERPL-SCNC: 2 MMOL/L (ref 0.5–2.2)
LACTATE SERPL-SCNC: 2.2 MMOL/L (ref 0.5–2.2)
LACTATE SERPL-SCNC: 3.1 MMOL/L (ref 0.5–2.2)
LPM (OHS): 3
LYMPHOCYTES # BLD AUTO: 0.62 X10(3)/MCL (ref 0.6–4.6)
LYMPHOCYTES NFR BLD AUTO: 6.5 %
MAGNESIUM SERPL-MCNC: 2 MG/DL (ref 1.6–2.6)
MAGNESIUM SERPL-MCNC: 2.1 MG/DL (ref 1.6–2.6)
MAGNESIUM SERPL-MCNC: 2.2 MG/DL (ref 1.6–2.6)
MCH RBC QN AUTO: 24.4 PG (ref 27–31)
MCHC RBC AUTO-ENTMCNC: 32.8 G/DL (ref 33–36)
MCV RBC AUTO: 74.6 FL (ref 80–94)
METHGB MFR BLDA: 0.9 % (ref 0–1.5)
MONOCYTES # BLD AUTO: 0.66 X10(3)/MCL (ref 0.1–1.3)
MONOCYTES NFR BLD AUTO: 6.9 %
NEUTROPHILS # BLD AUTO: 8.21 X10(3)/MCL (ref 2.1–9.2)
NEUTROPHILS NFR BLD AUTO: 86.2 %
NRBC BLD AUTO-RTO: 0.7 %
O2 HB BLOOD GAS (OHS): 93.4 % (ref 94–100)
OXYGEN DEVICE BLOOD GAS (OHS): ABNORMAL
OXYHGB MFR BLDA: 8.6 G/DL (ref 12–18)
PCO2 BLDA: 47 MMHG (ref 35–45)
PH BLDA: 7.61 [PH] (ref 7.35–7.45)
PHOSPHATE SERPL-MCNC: 3.1 MG/DL (ref 2.3–4.7)
PHOSPHATE SERPL-MCNC: 3.1 MG/DL (ref 2.3–4.7)
PHOSPHATE SERPL-MCNC: 3.3 MG/DL (ref 2.3–4.7)
PHOSPHATE SERPL-MCNC: 3.5 MG/DL (ref 2.3–4.7)
PHOSPHATE SERPL-MCNC: 3.8 MG/DL (ref 2.3–4.7)
PHOSPHATE SERPL-MCNC: 3.8 MG/DL (ref 2.3–4.7)
PLATELET # BLD AUTO: 147 X10(3)/MCL (ref 130–400)
PLATELETS.RETICULATED NFR BLD AUTO: 6.4 % (ref 0.9–11.2)
PMV BLD AUTO: ABNORMAL FL
PO2 BLDA: 72 MMHG (ref 75–100)
POCT GLUCOSE: 152 MG/DL (ref 70–110)
POCT GLUCOSE: 187 MG/DL (ref 70–110)
POCT GLUCOSE: 193 MG/DL (ref 70–110)
POCT GLUCOSE: 213 MG/DL (ref 70–110)
POTASSIUM SERPL-SCNC: 2.7 MMOL/L (ref 3.5–5.1)
POTASSIUM SERPL-SCNC: 3 MMOL/L (ref 3.5–5.1)
POTASSIUM SERPL-SCNC: 3 MMOL/L (ref 3.5–5.1)
POTASSIUM SERPL-SCNC: 3.1 MMOL/L (ref 3.5–5.1)
POTASSIUM SERPL-SCNC: 3.1 MMOL/L (ref 3.5–5.1)
POTASSIUM SERPL-SCNC: 3.2 MMOL/L (ref 3.5–5.1)
RBC # BLD AUTO: 3.11 X10(6)/MCL (ref 4.7–6.1)
SAMPLE SITE BLOOD GAS (OHS): ABNORMAL
SAO2 % BLDA: 97.5 %
SODIUM SERPL-SCNC: 130 MMOL/L (ref 136–145)
SODIUM SERPL-SCNC: 131 MMOL/L (ref 136–145)
SODIUM SERPL-SCNC: 131 MMOL/L (ref 136–145)
SODIUM SERPL-SCNC: 132 MMOL/L (ref 136–145)
SODIUM SERPL-SCNC: 132 MMOL/L (ref 136–145)
SODIUM SERPL-SCNC: 133 MMOL/L (ref 136–145)
WBC # BLD AUTO: 9.53 X10(3)/MCL (ref 4.5–11.5)

## 2025-05-08 PROCEDURE — 63600175 PHARM REV CODE 636 W HCPCS: Performed by: STUDENT IN AN ORGANIZED HEALTH CARE EDUCATION/TRAINING PROGRAM

## 2025-05-08 PROCEDURE — 25000003 PHARM REV CODE 250

## 2025-05-08 PROCEDURE — 63600175 PHARM REV CODE 636 W HCPCS

## 2025-05-08 PROCEDURE — 83605 ASSAY OF LACTIC ACID: CPT | Performed by: STUDENT IN AN ORGANIZED HEALTH CARE EDUCATION/TRAINING PROGRAM

## 2025-05-08 PROCEDURE — 85025 COMPLETE CBC W/AUTO DIFF WBC: CPT

## 2025-05-08 PROCEDURE — 82803 BLOOD GASES ANY COMBINATION: CPT

## 2025-05-08 PROCEDURE — 84100 ASSAY OF PHOSPHORUS: CPT

## 2025-05-08 PROCEDURE — 25000003 PHARM REV CODE 250: Performed by: STUDENT IN AN ORGANIZED HEALTH CARE EDUCATION/TRAINING PROGRAM

## 2025-05-08 PROCEDURE — 27000221 HC OXYGEN, UP TO 24 HOURS

## 2025-05-08 PROCEDURE — 94761 N-INVAS EAR/PLS OXIMETRY MLT: CPT

## 2025-05-08 PROCEDURE — 36415 COLL VENOUS BLD VENIPUNCTURE: CPT

## 2025-05-08 PROCEDURE — 83605 ASSAY OF LACTIC ACID: CPT

## 2025-05-08 PROCEDURE — 83735 ASSAY OF MAGNESIUM: CPT

## 2025-05-08 PROCEDURE — 99900035 HC TECH TIME PER 15 MIN (STAT)

## 2025-05-08 PROCEDURE — 36600 WITHDRAWAL OF ARTERIAL BLOOD: CPT

## 2025-05-08 PROCEDURE — 94640 AIRWAY INHALATION TREATMENT: CPT

## 2025-05-08 PROCEDURE — 27000207 HC ISOLATION

## 2025-05-08 PROCEDURE — 80048 BASIC METABOLIC PNL TOTAL CA: CPT

## 2025-05-08 PROCEDURE — 20000000 HC ICU ROOM

## 2025-05-08 PROCEDURE — 63600175 PHARM REV CODE 636 W HCPCS: Mod: JZ,TB

## 2025-05-08 PROCEDURE — 25000242 PHARM REV CODE 250 ALT 637 W/ HCPCS

## 2025-05-08 PROCEDURE — 83880 ASSAY OF NATRIURETIC PEPTIDE: CPT | Performed by: STUDENT IN AN ORGANIZED HEALTH CARE EDUCATION/TRAINING PROGRAM

## 2025-05-08 RX ORDER — POTASSIUM CHLORIDE 7.45 MG/ML
20 INJECTION INTRAVENOUS
Status: DISCONTINUED | OUTPATIENT
Start: 2025-05-08 | End: 2025-05-08

## 2025-05-08 RX ORDER — POTASSIUM CHLORIDE 7.45 MG/ML
10 INJECTION INTRAVENOUS
Status: COMPLETED | OUTPATIENT
Start: 2025-05-08 | End: 2025-05-09

## 2025-05-08 RX ORDER — POTASSIUM CHLORIDE 750 MG/1
50 CAPSULE, EXTENDED RELEASE ORAL ONCE
Status: COMPLETED | OUTPATIENT
Start: 2025-05-08 | End: 2025-05-08

## 2025-05-08 RX ORDER — POTASSIUM CHLORIDE 14.9 MG/ML
20 INJECTION INTRAVENOUS
Status: COMPLETED | OUTPATIENT
Start: 2025-05-08 | End: 2025-05-08

## 2025-05-08 RX ORDER — ACETAZOLAMIDE 500 MG/5ML
500 INJECTION, POWDER, LYOPHILIZED, FOR SOLUTION INTRAVENOUS ONCE
Status: COMPLETED | OUTPATIENT
Start: 2025-05-08 | End: 2025-05-08

## 2025-05-08 RX ORDER — POTASSIUM CHLORIDE 7.45 MG/ML
10 INJECTION INTRAVENOUS
Status: DISCONTINUED | OUTPATIENT
Start: 2025-05-08 | End: 2025-05-08

## 2025-05-08 RX ORDER — POTASSIUM CHLORIDE 14.9 MG/ML
20 INJECTION INTRAVENOUS
Status: DISCONTINUED | OUTPATIENT
Start: 2025-05-08 | End: 2025-05-08

## 2025-05-08 RX ORDER — ACETAZOLAMIDE 500 MG/5ML
250 INJECTION, POWDER, LYOPHILIZED, FOR SOLUTION INTRAVENOUS ONCE
Status: DISCONTINUED | OUTPATIENT
Start: 2025-05-08 | End: 2025-05-08

## 2025-05-08 RX ORDER — POTASSIUM CHLORIDE 750 MG/1
50 CAPSULE, EXTENDED RELEASE ORAL ONCE
Status: DISCONTINUED | OUTPATIENT
Start: 2025-05-08 | End: 2025-05-08

## 2025-05-08 RX ADMIN — FOLIC ACID 1 MG: 1 TABLET ORAL at 09:05

## 2025-05-08 RX ADMIN — RIVAROXABAN 20 MG: 10 TABLET, FILM COATED ORAL at 05:05

## 2025-05-08 RX ADMIN — POTASSIUM CHLORIDE 10 MEQ: 7.46 INJECTION, SOLUTION INTRAVENOUS at 03:05

## 2025-05-08 RX ADMIN — POTASSIUM CHLORIDE 10 MEQ: 7.46 INJECTION, SOLUTION INTRAVENOUS at 04:05

## 2025-05-08 RX ADMIN — MICONAZOLE NITRATE: 20 POWDER TOPICAL at 09:05

## 2025-05-08 RX ADMIN — POTASSIUM CHLORIDE 10 MEQ: 7.46 INJECTION, SOLUTION INTRAVENOUS at 12:05

## 2025-05-08 RX ADMIN — POTASSIUM CHLORIDE 20 MEQ: 14.9 INJECTION, SOLUTION INTRAVENOUS at 04:05

## 2025-05-08 RX ADMIN — POTASSIUM CHLORIDE 10 MEQ: 7.46 INJECTION, SOLUTION INTRAVENOUS at 08:05

## 2025-05-08 RX ADMIN — Medication 1000 UNITS: at 09:05

## 2025-05-08 RX ADMIN — LEVALBUTEROL HYDROCHLORIDE 1.25 MG: 1.25 SOLUTION RESPIRATORY (INHALATION) at 07:05

## 2025-05-08 RX ADMIN — POTASSIUM CHLORIDE 20 MEQ: 14.9 INJECTION, SOLUTION INTRAVENOUS at 08:05

## 2025-05-08 RX ADMIN — POTASSIUM CHLORIDE 20 MEQ: 14.9 INJECTION, SOLUTION INTRAVENOUS at 02:05

## 2025-05-08 RX ADMIN — ATORVASTATIN CALCIUM 80 MG: 40 TABLET, FILM COATED ORAL at 09:05

## 2025-05-08 RX ADMIN — QUETIAPINE FUMARATE 100 MG: 100 TABLET ORAL at 08:05

## 2025-05-08 RX ADMIN — METOLAZONE 5 MG: 5 TABLET ORAL at 09:05

## 2025-05-08 RX ADMIN — POTASSIUM CHLORIDE 10 MEQ: 7.46 INJECTION, SOLUTION INTRAVENOUS at 10:05

## 2025-05-08 RX ADMIN — DOBUTAMINE HYDROCHLORIDE 5 MCG/KG/MIN: 400 INJECTION INTRAVENOUS at 04:05

## 2025-05-08 RX ADMIN — POTASSIUM CHLORIDE 10 MEQ: 7.46 INJECTION, SOLUTION INTRAVENOUS at 09:05

## 2025-05-08 RX ADMIN — PANTOPRAZOLE SODIUM 40 MG: 40 TABLET, DELAYED RELEASE ORAL at 09:05

## 2025-05-08 RX ADMIN — POTASSIUM CHLORIDE 10 MEQ: 7.46 INJECTION, SOLUTION INTRAVENOUS at 11:05

## 2025-05-08 RX ADMIN — HYDROCORTISONE SODIUM SUCCINATE 50 MG: 100 INJECTION, POWDER, FOR SOLUTION INTRAMUSCULAR; INTRAVENOUS at 09:05

## 2025-05-08 RX ADMIN — POTASSIUM CHLORIDE 50 MEQ: 750 CAPSULE, EXTENDED RELEASE ORAL at 08:05

## 2025-05-08 RX ADMIN — INSULIN ASPART 2 UNITS: 100 INJECTION, SOLUTION INTRAVENOUS; SUBCUTANEOUS at 05:05

## 2025-05-08 RX ADMIN — BUMETANIDE 3 MG: 0.25 INJECTION INTRAMUSCULAR; INTRAVENOUS at 09:05

## 2025-05-08 RX ADMIN — POTASSIUM CHLORIDE 10 MEQ: 7.46 INJECTION, SOLUTION INTRAVENOUS at 02:05

## 2025-05-08 RX ADMIN — ACETAMINOPHEN 650 MG: 325 TABLET, FILM COATED ORAL at 06:05

## 2025-05-08 RX ADMIN — HYDROCORTISONE SODIUM SUCCINATE 50 MG: 100 INJECTION, POWDER, FOR SOLUTION INTRAMUSCULAR; INTRAVENOUS at 08:05

## 2025-05-08 RX ADMIN — POTASSIUM CHLORIDE 20 MEQ: 14.9 INJECTION, SOLUTION INTRAVENOUS at 06:05

## 2025-05-08 RX ADMIN — IPRATROPIUM BROMIDE 0.5 MG: 0.5 SOLUTION RESPIRATORY (INHALATION) at 07:05

## 2025-05-08 RX ADMIN — POTASSIUM CHLORIDE 10 MEQ: 7.46 INJECTION, SOLUTION INTRAVENOUS at 01:05

## 2025-05-08 RX ADMIN — CLOPIDOGREL BISULFATE 75 MG: 75 TABLET, FILM COATED ORAL at 09:05

## 2025-05-08 RX ADMIN — SPIRONOLACTONE 25 MG: 25 TABLET, FILM COATED ORAL at 09:05

## 2025-05-08 RX ADMIN — ACETAZOLAMIDE 500 MG: 500 INJECTION, POWDER, LYOPHILIZED, FOR SOLUTION INTRAVENOUS at 02:05

## 2025-05-08 NOTE — PROGRESS NOTES
Medical POA completed with pt, assigning niece, Tiffanie Sweeney ([phone]), as decision maker. Copy placed in pt's chart.

## 2025-05-08 NOTE — PROGRESS NOTES
Cardiology Progress Note     Cardiology Attending: Dayanna Johnson MD  Cardiology Fellow: Vernon Cifuentes MD     Date of Admit: 4/21/2025    History of Present Illness:      Ran Reyes Jr. is a 69 y.o. male with NICM-HFrEF (LVEF 20-25%; G3DD), HTN, non obstructive CAD, AF on OAC, PAD, HLD, and COPD who presented with acute on chronic decompensated heart failure in cardiogenic shock.     No acute events overnight. Patient continues to complain of dyspnea. Remains on supplemental oxygen and Dobutamine 5 fixed dose.     Net negative 4.6 L with bolus Bumex and Metolazone with MRA.     Without symptoms of angina.       Past Medical History:     Past Medical History:   Diagnosis Date    A-fib     Anticoagulant long-term use     Anxiety     Aortic aneurysm     Cataract     CHF (congestive heart failure)     Chronic atrial fibrillation     COPD (chronic obstructive pulmonary disease)     Coronary artery disease     Depression     HLD (hyperlipidemia)     Hypertension     Mitral regurgitation     PAD (peripheral artery disease)     Primary open angle glaucoma (POAG)      Surgical History:     Past Surgical History:   Procedure Laterality Date    ANGIOGRAM, CORONARY, WITH LEFT HEART CATHETERIZATION N/A 03/26/2024    Procedure: Angiogram, Coronary, with Left Heart Cath;  Surgeon: Adriano Montiel MD;  Location: [facility] CATH LAB;  Service: Cardiology;  Laterality: N/A;    ATHERECTOMY OF PERIPHERAL VESSEL Left 09/12/2022    LEFT SFA ATHERECTOMY, BALLOON ANGIOPLASTY    CATARACT EXTRACTION W/  INTRAOCULAR LENS IMPLANT Left 03/09/2023    Procedure: EXTRACTION, CATARACT, WITH IOL INSERTION;  Surgeon: Georgi Borja MD;  Location: [facility] OR;  Service: Ophthalmology;  Laterality: Left;  19.5  mac    EGD, WITH CLOSED BIOPSY  03/22/2024    Procedure: EGD, WITH CLOSED BIOPSY;  Surgeon: Ronald Calderon MD;  Location: [facility] ENDOSCOPY;  Service: Gastroenterology;;    ESOPHAGOGASTRODUODENOSCOPY N/A 03/22/2024    Procedure: EGD;   Surgeon: Ronald Calderon MD;  Location: [facility] ENDOSCOPY;  Service: Gastroenterology;  Laterality: N/A;    Heart Stent N/A     > 10yrs. AGO    INCISION AND DRAINAGE N/A 03/18/2024    Procedure: Incision and Drainage;  Surgeon: Fahad Rivas MD;  Location: [facility] OR;  Service: Urology;  Laterality: N/A;  I&D SCROTAL ABSCESS    INSERTION OF STENT INTO PERIPHERAL VESSEL Right 10/17/2022    RIGHT SFA ATHERECTOMY, BALLOON ANGIOPLASTY, STENT; RIGHT ANTERIOR TIBIAL ARTERY ATHERECTOMY, BALLOON ANGIOPLASTY    ORCHIECTOMY Left 03/18/2024    Procedure: ORCHIECTOMY;  Surgeon: Fahad Rivas MD;  Location: [facility] OR;  Service: Urology;  Laterality: Left;     Allergies:   Review of patient's allergies indicates:  No Known Allergies  Family History:     Family History   Problem Relation Name Age of Onset    Cancer Mother      Hypertension Mother      Heart failure Father      Stroke Father      Hypertension Father      No Known Problems Sister       Social History:   Social History[1]  Review of Systems:     The patient denies headaches, changes in vision, nausea, emesis, fevers, chills, chest pain, palpitations, abdominal pain, or changes in urinary or bowel habits.     Medications:     Home Medications:  Prior to Admission medications    Medication Sig Start Date End Date Taking? Authorizing Provider   aspirin (ECOTRIN) 81 MG EC tablet Take 1 tablet by mouth once daily. 3/14/25  Yes Provider, Historical   olopatadine (PATANOL) 0.1 % ophthalmic solution Apply to eye. 2/7/25  Yes Provider, Historical   sacubitriL-valsartan (ENTRESTO) 49-51 mg per tablet Take 1 tablet by mouth once daily. 2/12/25  Yes Provider, Historical   urea (CARMOL) 40 % Crea Apply topically. 2/13/25  Yes Provider, Historical   varenicline tartrate (CHANTIX) 1 mg Tab Take 0.5 mg by mouth. 2/13/25  Yes Provider, Historical   acetaminophen 325 mg Cap Take 650 mg by mouth 2 (two) times daily as needed.    Provider, Historical   ALBUTEROL INHL  Inhale 2 puffs into the lungs every 6 (six) hours as needed.    Provider, Historical   albuterol-ipratropium (DUO-NEB) 2.5 mg-0.5 mg/3 mL nebulizer solution Take 3 mLs by nebulization every 6 (six) hours as needed for Wheezing. Rescue 12/9/24   Diana Hassan MD   atorvastatin (LIPITOR) 80 MG tablet Take 1 tablet (80 mg total) by mouth once daily. 3/4/25 3/4/26  Marino Marquez DO   BREZTRI AEROSPHERE 160-9-4.8 mcg/actuation HFAA Inhale 2 puffs into the lungs 2 (two) times daily. 1/7/25   Provider, Historical   cetirizine (ZYRTEC) 10 MG tablet Take 1 tablet (10 mg total) by mouth once daily. 8/8/24 8/3/25  Abiodun Recinos DO   clopidogreL (PLAVIX) 75 mg tablet Take 1 tablet (75 mg total) by mouth once daily. 3/4/25   Marino Marquez DO   folic acid (FOLVITE) 1 MG tablet Take 1 tablet (1 mg total) by mouth once daily. 4/9/24 4/9/25  Tha Edmond MD   furosemide (LASIX) 40 MG tablet Take 1 tablet (40 mg total) by mouth 2 (two) times a day. 3/4/25   Marino Marquez DO   gabapentin (NEURONTIN) 300 MG capsule Take 300 mg by mouth 3 (three) times daily.    Provider, Historical   metoprolol succinate (TOPROL-XL) 25 MG 24 hr tablet Take 0.5 tablets (12.5 mg total) by mouth once daily. 3/4/25 3/4/26  Marino Marquez DO   nicotine (NICODERM CQ) 14 mg/24 hr Place 1 patch onto the skin daily as needed. 3/4/25   Marino Marquez DO   nicotine (NICODERM CQ) 21 mg/24 hr Place 1 patch onto the skin once daily. 3/20/25   Heaven Page MD   nitrofurantoin, macrocrystal-monohydrate, (MACROBID) 100 MG capsule Take 1 capsule (100 mg total) by mouth 2 (two) times daily. 3/4/25   Marino Marquez DO   pantoprazole (PROTONIX) 40 MG tablet Take 1 tablet (40 mg total) by mouth 2 (two) times a day. 9/23/24   Abiodun Recinos DO   potassium chloride SA (K-DUR,KLOR-CON) 20 MEQ tablet Take 1 tablet (20 mEq total) by mouth 2 (two) times daily. 1/28/25 1/28/26  Vernon Cifuentes MD   rivaroxaban (XARELTO) 20 mg Tab Take 1 tablet (20 mg total) by mouth  Daily. 3/4/25   Marino Marquez DO   sertraline (ZOLOFT) 100 MG tablet Take 100 mg by mouth once daily.    Provider, Historical   vitamin D (VITAMIN D3) 1000 units Tab Take 1,000 Units by mouth once daily.    Provider, Historical       Current/Inpatient Medications:  Infusions:   0.9% NaCl   Intravenous Continuous   Stopped at 05/04/25 2254    DOBUTamine IV infusion (non-titrating)  5 mcg/kg/min Intravenous Continuous 6.9 mL/hr at 05/08/25 0539 5 mcg/kg/min at 05/08/25 0539     Scheduled:   atorvastatin  80 mg Oral Daily    bumetanide  3 mg Intravenous Q12H    clopidogreL  75 mg Oral Daily    folic acid  1 mg Oral Daily    hydrocortisone sodium succinate  50 mg Intravenous Q12H    ipratropium  0.5 mg Nebulization Q12H    levalbuterol  1.25 mg Nebulization Q12H    metOLazone  5 mg Oral Daily    miconazole NITRATE 2 %   Topical (Top) BID    pantoprazole  40 mg Oral BID    potassium chloride  10 mEq Intravenous Q1H    QUEtiapine  100 mg Oral QHS    rivaroxaban  20 mg Oral Daily    spironolactone  25 mg Oral Daily    vitamin D  1,000 Units Oral Daily     PRN:    Current Facility-Administered Medications:     acetaminophen, 650 mg, Oral, Q4H PRN    aluminum-magnesium hydroxide, 30 mL, Oral, BID PRN    dextrose 50%, 12.5 g, Intravenous, PRN    dextrose 50%, 25 g, Intravenous, PRN    glucagon (human recombinant), 1 mg, Intramuscular, PRN    glucose, 16 g, Oral, PRN    glucose, 24 g, Oral, PRN    hydrALAZINE, 10 mg, Intravenous, Q4H PRN    hydrocortisone, , Topical (Top), TID PRN    insulin aspart U-100, 0-5 Units, Subcutaneous, QID (AC + HS) PRN    labetalol, 10 mg, Intravenous, Q4H PRN    lorazepam, 1 mg, Intravenous, Q6H PRN    melatonin, 6 mg, Oral, Nightly PRN    naloxone, 0.02 mg, Intravenous, PRN    nicotine, 1 patch, Transdermal, Daily PRN    polyethylene glycol, 17 g, Oral, Daily PRN    prochlorperazine, 5 mg, Intravenous, Q6H PRN    senna-docusate, 1 tablet, Oral, BID PRN    simethicone, 1 tablet, Oral, TID PRN     sodium chloride 0.9%, 10 mL, Intravenous, PRN    traZODone, 25 mg, Oral, Nightly PRN    Objective:     24-hour Vitals:   Temp:  [97.5 °F (36.4 °C)-98.3 °F (36.8 °C)] 98.3 °F (36.8 °C)  Pulse:  [] 99  Resp:  [11-34] 17  SpO2:  [90 %-100 %] 99 %  BP: ()/(58-89) 108/78    Vitals: /78   Pulse 99   Temp 98.3 °F (36.8 °C)   Resp 17   Ht 6' (1.829 m)   Wt 88.6 kg (195 lb 5.2 oz)   SpO2 99%   BMI 26.49 kg/m²     Intake/Output Summary (Last 24 hours) at 5/8/2025 0717  Last data filed at 5/8/2025 0539  Gross per 24 hour   Intake 972.37 ml   Output 5670 ml   Net -4697.63 ml       Physical Exam  Vitals reviewed.   Constitutional:       General: He is not in acute distress.     Appearance: Normal appearance. He is normal weight. He is not ill-appearing.   HENT:      Head: Normocephalic and atraumatic.      Right Ear: External ear normal.      Left Ear: External ear normal.      Nose: Nose normal.   Eyes:      Conjunctiva/sclera: Conjunctivae normal.   Neck:      Comments: JVD  Cardiovascular:      Rate and Rhythm: Normal rate. Rhythm irregular.      Pulses: Normal pulses.      Heart sounds: Murmur heard.   Pulmonary:      Effort: Pulmonary effort is normal. No respiratory distress.      Breath sounds: No stridor. Wheezing and rales present. No rhonchi.      Comments: 3L NC.   Chest:      Chest wall: No tenderness.   Abdominal:      General: Bowel sounds are normal. There is no distension.      Palpations: Abdomen is soft.   Musculoskeletal:      Cervical back: Neck supple.      Right lower leg: Edema present.      Left lower leg: Edema present.      Comments: Improved LE edema.    Skin:     General: Skin is warm and dry.      Capillary Refill: Capillary refill takes less than 2 seconds.   Neurological:      Mental Status: He is alert and oriented to person, place, and time.   Psychiatric:         Mood and Affect: Mood normal.         Behavior: Behavior normal.        Labs:   I have reviewed the following  labs below:    Recent Labs   Lab 05/03/25  0542 05/03/25  1230 05/04/25  0215 05/04/25  1409 05/05/25  0206 05/05/25  0841 05/06/25  0318 05/06/25  0726 05/06/25  1823 05/07/25  0013 05/07/25  0426 05/07/25  0813 05/07/25  1936 05/08/25  0018 05/08/25  0439   WBC 7.41  --  8.47  --  11.14  --  10.32  --   --   --  8.54  --   --   --  9.53   HGB 8.2*  --  7.7*  --  7.8*  --  7.6*  --   --   --  7.7*  --   --   --  7.6*   HCT 25.8*  --  23.6*  --  24.2*  --  22.8*  --   --   --  22.9*  --   --   --  23.2*     --  147  --  148  --  145  --   --   --  149  --   --   --  147   *   < > 131*   < > 131*   < > 134*   < > 135* 135* 135*   < > 131* 130* 131*   K 4.3   < > 4.0   < > 4.3   < > 3.7   < > 3.3* 3.5 2.7*   < > 3.2* 3.0* 3.2*   CL 95*   < > 93*   < > 92*   < > 92*   < > 87* 86* 83*   < > 78* 77* 79*   CO2 16*   < > 21*   < > 23   < > 23   < > 30 30 35*   < > 35* 35* 36*   BUN 36.3*   < > 46.5*   < > 51.9*   < > 56.7*   < > 55.0* 56.3* 55.4*   < > 60.6* 60.0* 59.9*   CREATININE 1.91*   < > 1.97*   < > 1.84*   < > 1.89*   < > 1.65* 1.71* 1.64*   < > 1.71* 1.63* 1.60*      < > 210*   < > 165*   < > 147*   < > 144* 158* 162*   < > 156* 162* 149*   CALCIUM 9.9   < > 9.4   < > 9.1   < > 9.2   < > 9.4 9.4 9.3   < > 9.5 9.5 9.5   MG 2.10  --  2.40  --  2.30   < > 2.20   < > 2.20 2.20 2.10   < > 2.10 2.10 2.10   PHOS 4.0  --  3.6  --  3.4   < > 3.1   < > 2.5 2.6 3.3   < > 3.1 3.3 3.8   PROT 8.0*  --  8.0*  --  8.0*  --   --   --   --   --   --   --   --   --   --    ALBUMIN 3.4  --  3.4  --  3.4  --   --   --   --   --   --   --   --   --   --    BILITOT 5.4*  --  5.0*  --  4.7*  --   --   --   --   --   --   --   --   --   --    AST 36  --  45  --  62*  --   --   --   --   --   --   --   --   --   --    ALT 18  --  20  --  25  --   --   --   --   --   --   --   --   --   --    ALKPHOS 213*  --  231*  --  220*  --   --   --   --   --   --   --   --   --   --    TROPONINI  --   --   --    < >  --    < >  --     < > 0.106* 0.120* 0.095*  --   --   --   --     < > = values in this interval not displayed.     Cardiovascular Studies/Imaging:   I have reviewed the following studies below:  TTE:   Summary  Show Result Comparison     Left Ventricle: The left ventricle is severely dilated. Severely increased ventricular mass. Mildly increased wall thickness. There is severe eccentric hypertrophy. Severe global hypokinesis present. There is severely reduced systolic function. Quantitated ejection fraction is 22%. Grade III diastolic dysfunction.    Right Ventricle: The right ventricle has moderate enlargement. Systolic function is moderately reduced.    Left Atrium: Left atrium is severely dilated measuring 92ml/m2.    Right Atrium: Right atrium is severely dilated.    Aortic Valve: The aortic valve is a trileaflet valve. There is mild aortic valve sclerosis. There is no stenosis. There is no significant regurgitation.    Mitral Valve: The mitral valve is structurally normal. There is no stenosis. There is severe central regurgitation.    Tricuspid Valve: The tricuspid valve is structurally normal. There is moderate regurgitation.    Pulmonary Artery: There is moderate pulmonary hypertension. The estimated pulmonary artery systolic pressure is 59 mmHg.    IVC/SVC: Elevated venous pressure at 15 mmHg.    Pericardium: There is no pericardial effusion.    LHC/Cors: 3/26/2024  Coronary findings:     Dominance: right   Left main:  10-20% calcified distal left main disease  Left anterior descending artery:  50% calcified proximal stenosis  Circumflex artery:  Luminal irregularities  Right coronary artery:  Luminal irregularities    Imaging:   See imaging section for details.     Assessment:     Ran Reyes Jr. is a 69 y.o. male with NICM-HFrEF (LVEF 20-25%; G3DD), HTN, non obstructive CAD, AF on OAC, PAD, HLD, and COPD who presented with acute on chronic decompensated heart failure in cardiogenic shock now resolved.      Plan/Recommendations:   Acute on Chronic NICM-HFrEF with Resolved CS and Severe Mitral Regurgitation   -Continue fixed dose, decrease to Dobutamine 2.5 mcg/kg/min. Reassess clinically after change.   -Sequential nephron blockade. Bumex 3 mg BID. Metolazone 5 mg daily. Aldactone 25 mg daily.      -Start Entresto 24-26 mg BID.   -Continue to hold BB (on inotropic support). No SGLT2i (has history of Claudine's).    -Reassess LA 2 hours after decreasing dose of Dobutamine.   -Close monitoring of electrolytes. Replace as clinically warranted.     Atrial Fibrillation  -Hold BB for now given above.   -Continue anticoagulation for CVA risk reduction.     Hypertension  -ARNi and MRA as above.      Non Obstructive Epicardial Coronary Artery Disease  Peripheral Artery Disease  -SAPT and high intensity statin therapy.     Vernon Cifuentes MD  [facility] Chief Cardiology Fellow, PGY-6  [facility]                [1]   Social History  Tobacco Use    Smoking status: Every Day     Current packs/day: 0.50     Average packs/day: 0.8 packs/day for 50.3 years (40.9 ttl pk-yrs)     Types: Cigarettes     Start date: 1975     Passive exposure: Current    Smokeless tobacco: Never    Tobacco comments:     States smoke 2-3 cigarettes per day   Substance Use Topics    Alcohol use: Yes     Alcohol/week: 3.0 standard drinks of alcohol     Types: 3 Cans of beer per week     Comment: socially    Drug use: Not Currently     Frequency: 1.0 times per week     Types: Marijuana     Comment: no use in over 1 year

## 2025-05-08 NOTE — PLAN OF CARE
Problem: Skin Injury Risk Increased  Goal: Skin Health and Integrity  Outcome: Progressing     Problem: Adult Inpatient Plan of Care  Goal: Plan of Care Review  Outcome: Progressing  Goal: Patient-Specific Goal (Individualized)  Outcome: Progressing  Goal: Absence of Hospital-Acquired Illness or Injury  Outcome: Progressing  Goal: Optimal Comfort and Wellbeing  Outcome: Progressing  Goal: Readiness for Transition of Care  Outcome: Progressing     Problem: COPD (Chronic Obstructive Pulmonary Disease)  Goal: Optimal Chronic Illness Coping  Outcome: Progressing  Goal: Optimal Level of Functional [place]  Outcome: Progressing  Goal: Absence of Infection Signs and Symptoms  Outcome: Progressing  Goal: Improved Oral Intake  Outcome: Progressing     Problem: Heart Failure  Goal: Optimal Coping  Outcome: Progressing  Goal: Optimal Cardiac Output  Outcome: Progressing  Goal: Stable Heart Rate and Rhythm  Outcome: Progressing  Goal: Optimal Functional Ability  Outcome: Progressing  Goal: Fluid and Electrolyte Balance  Outcome: Progressing  Goal: Improved Oral Intake  Outcome: Progressing     Problem: Fall Injury Risk  Goal: Absence of Fall and Fall-Related Injury  Outcome: Progressing     Problem: Wound  Goal: Optimal Coping  Outcome: Progressing  Goal: Optimal Functional Ability  Outcome: Progressing  Goal: Absence of Infection Signs and Symptoms  Outcome: Progressing  Goal: Improved Oral Intake  Outcome: Progressing  Goal: Optimal Pain Control and Function  Outcome: Progressing  Goal: Skin Health and Integrity  Outcome: Progressing  Goal: Optimal Wound Healing  Outcome: Progressing     Problem: Acute Kidney Injury/Impairment  Goal: Fluid and Electrolyte Balance  Outcome: Progressing  Goal: Improved Oral Intake  Outcome: Progressing  Goal: Effective Renal Function  Outcome: Progressing     Problem: Dysrhythmia  Goal: Normalized Cardiac Rhythm  Outcome: Progressing     Problem: Infection  Goal: Absence of Infection  Signs and Symptoms  Outcome: Progressing

## 2025-05-08 NOTE — PROGRESS NOTES
[facility] - [address] [place] [address] Telemetry  Pulmonary Critical Care Note    Patient Name: Ran eRyes Jr.  MRN: [id number]  Admission Date: 4/21/2025  Hospital Length of Stay: 16 days  Code Status: Full Code  Attending Provider: Trisha German MD  Primary Care Provider: Abiodun Recinos DO     Subjective:     HPI:   Ran Reyes Jr. is a 69 y.o. 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 Patient reports he can only ambulate about 20-30 feet before having to stop due to claudication pain which is chronic and unchanged compared to baseline. Since running out of his diuretic medications approximately 5 days ago he has noted progressively worsening lower extremity swelling causing leg heaviness and weakness exacerbating difficulty ambulating.  He also notes acute on chronic cough productive of a brownish sputum without hemoptysis.  Denies fever, chills, sweats.  Denies chest pain.  Endorses shortness of breath at rest and with ambulation but unchanged compared to baseline.  Denies abdominal pain, nausea, vomiting, constipation, diarrhea.  Denies increased or decreased urinary frequency, dysuria, or hematuria.  Sleeps with 2 pillows at night due to baseline orthopnea. Has not had to increase number of pillows or change sleeping habits. Wears 2L NC home oxygen at baseline for hx of COPD. Denies having increased oxygen requirements prior to ED presentation.     ED course:  Vital signs stable.  Oxygenation  stable on 2 L nasal cannula.  CBC shows microcytic anemic (HGB 9.0; MCV 77).  CMP shows hyponatremia (Na 128), acidemia (CO2 14), elevated renal indices (BUN 25; creatinine 1.93), elevated alkaline phosphatase (). Troponin WNL.  BNP 26 39.5.  UDS negative.  EKG showed atrial fibrillation with PVCs.  Chest x-ray difficult to interpret due to overlying medical devices however appears to show cardiomegaly with bilateral pulmonary vascular congestion with question of bilateral pleural effusions. He was admitted for acute CHF exacerbation and cardiorenal syndrome.        Hospital Course/Significant events:  4/23/25: Admit to ICU, started on pressors and inotropes for cardiogenic / septic shock   5/2/25: Downgraded to    5/3/25: Re-upgraded to ICU 2/2 worsening lactate and agonal breathing     24 Hour Interval History:  No acute events overnight.  Vital signs stable on 3 L nasal cannula satting well.  UO 5.67 L (net negative 4.7 L). after discontinuing Bumex drip and starting Bumex 3 mg b.i.d..  Lactate 2.  CBC revealing stable microcytic anemia.  BNP Na 131, K 3.2, B/Cr 60/1.6 (stable).  Troponin downtrending.  Patient denies any new complaints.      Past Medical History:   Diagnosis Date    A-fib     Anticoagulant long-term use     Anxiety     Aortic aneurysm     Cataract     CHF (congestive heart failure)     Chronic atrial fibrillation     COPD (chronic obstructive pulmonary disease)     Coronary artery disease     Depression     HLD (hyperlipidemia)     Hypertension     Mitral regurgitation     PAD (peripheral artery disease)     Primary open angle glaucoma (POAG)        Past Surgical History:   Procedure Laterality Date    ANGIOGRAM, CORONARY, WITH LEFT HEART CATHETERIZATION N/A 03/26/2024    Procedure: Angiogram, Coronary, with Left Heart Cath;  Surgeon: Adriano Montiel MD;  Location: [facility] CATH LAB;  Service: Cardiology;  Laterality: N/A;    ATHERECTOMY OF PERIPHERAL VESSEL Left 09/12/2022    LEFT SFA  ATHERECTOMY, BALLOON ANGIOPLASTY    CATARACT EXTRACTION W/  INTRAOCULAR LENS IMPLANT Left 03/09/2023    Procedure: EXTRACTION, CATARACT, WITH IOL INSERTION;  Surgeon: Georgi Borja MD;  Location: [facility];  Service: Ophthalmology;  Laterality: Left;  19.5  mac    EGD, WITH CLOSED BIOPSY  03/22/2024    Procedure: EGD, WITH CLOSED BIOPSY;  Surgeon: Ronald Calderon MD;  Location: [facility] ENDOSCOPY;  Service: Gastroenterology;;    ESOPHAGOGASTRODUODENOSCOPY N/A 03/22/2024    Procedure: EGD;  Surgeon: Ronald Calderon MD;  Location: [facility] ENDOSCOPY;  Service: Gastroenterology;  Laterality: N/A;    Heart Stent N/A     > 10yrs. AGO    INCISION AND DRAINAGE N/A 03/18/2024    Procedure: Incision and Drainage;  Surgeon: Fahad Rivas MD;  Location: [facility];  Service: Urology;  Laterality: N/A;  I&D SCROTAL ABSCESS    INSERTION OF STENT INTO PERIPHERAL VESSEL Right 10/17/2022    RIGHT SFA ATHERECTOMY, BALLOON ANGIOPLASTY, STENT; RIGHT ANTERIOR TIBIAL ARTERY ATHERECTOMY, BALLOON ANGIOPLASTY    ORCHIECTOMY Left 03/18/2024    Procedure: ORCHIECTOMY;  Surgeon: Fahad Rivas MD;  Location: [facility];  Service: Urology;  Laterality: Left;       Social History[1]    Current Outpatient Medications   Medication Instructions    acetaminophen 650 mg, 2 times daily PRN    ALBUTEROL INHL 2 puffs, Every 6 hours PRN    albuterol-ipratropium (DUO-NEB) 2.5 mg-0.5 mg/3 mL nebulizer solution 3 mLs, Nebulization, Every 6 hours PRN, Rescue    aspirin (ECOTRIN) 81 MG EC tablet 1 tablet, Daily    atorvastatin (LIPITOR) 80 mg, Oral, Daily    BREZTRI AEROSPHERE 160-9-4.8 mcg/actuation HFAA 2 puffs, 2 times daily    cetirizine (ZYRTEC) 10 mg, Oral, Daily    clopidogreL (PLAVIX) 75 mg, Oral, Daily    folic acid (FOLVITE) 1 mg, Oral, Daily    furosemide (LASIX) 40 mg, Oral, 2 times daily    gabapentin (NEURONTIN) 300 mg, Oral, 3 times daily    metoprolol succinate (TOPROL-XL) 12.5 mg, Oral, Daily    nicotine (NICODERM  CQ) 14 mg/24 hr 1 patch, Transdermal, Daily PRN    nicotine (NICODERM CQ) 21 mg/24 hr 1 patch, Transdermal, Daily    nitrofurantoin, macrocrystal-monohydrate, (MACROBID) 100 MG capsule 100 mg, Oral, 2 times daily    olopatadine (PATANOL) 0.1 % ophthalmic solution Apply to eye.    pantoprazole (PROTONIX) 40 mg, Oral, 2 times daily    potassium chloride SA (K-DUR,KLOR-CON) 20 MEQ tablet 20 mEq, Oral, 2 times daily    rivaroxaban (XARELTO) 20 mg, Oral, Daily    sacubitriL-valsartan (ENTRESTO) 49-51 mg per tablet 1 tablet, Daily    sertraline (ZOLOFT) 100 mg, Oral, Daily    urea (CARMOL) 40 % Crea Apply topically.    varenicline tartrate (CHANTIX) 0.5 mg    vitamin D (VITAMIN D3) 1,000 Units, Daily     Current Inpatient Medications   atorvastatin  80 mg Oral Daily    bumetanide  3 mg Intravenous Q12H    clopidogreL  75 mg Oral Daily    folic acid  1 mg Oral Daily    hydrocortisone sodium succinate  50 mg Intravenous Q12H    ipratropium  0.5 mg Nebulization Q12H    levalbuterol  1.25 mg Nebulization Q12H    metOLazone  5 mg Oral Daily    miconazole NITRATE 2 %   Topical (Top) BID    pantoprazole  40 mg Oral BID    potassium chloride  10 mEq Intravenous Q1H    QUEtiapine  100 mg Oral QHS    rivaroxaban  20 mg Oral Daily    spironolactone  25 mg Oral Daily    vitamin D  1,000 Units Oral Daily     Current Intravenous Infusions   0.9% NaCl   Intravenous Continuous   Stopped at 05/04/25 2254    DOBUTamine IV infusion (non-titrating)  5 mcg/kg/min Intravenous Continuous 6.9 mL/hr at 05/08/25 0539 5 mcg/kg/min at 05/08/25 0539     ROS: neg unless stated above        Objective:     Intake/Output Summary (Last 24 hours) at 5/8/2025 0718  Last data filed at 5/8/2025 0539  Gross per 24 hour   Intake 972.37 ml   Output 5670 ml   Net -4697.63 ml     Vital Signs (Most Recent):  Temp: 98.3 °F (36.8 °C) (05/08/25 0324)  Pulse: 99 (05/08/25 0706)  Resp: 17 (05/08/25 0706)  BP: 108/78 (05/08/25 0600)  SpO2: 99 % (05/08/25 0706)  Body mass  index is 26.49 kg/m².  Weight: 88.6 kg (195 lb 5.2 oz) Vital Signs (24h Range):  Temp:  [97.5 °F (36.4 °C)-98.3 °F (36.8 °C)] 98.3 °F (36.8 °C)  Pulse:  [] 99  Resp:  [11-34] 17  SpO2:  [90 %-100 %] 99 %  BP: ()/(58-89) 108/78     Physical Exam:  General: Mild distress, on 4L supplemental oxygen  HEENT: NC/AT; PERRL; scleral icterus, nasal and oral mucosa moist and clear  Pulm: + rales bibasilar and mild expiratory wheezing noted.  CV: S1, S2 irregular irregular w/o murmurs or gallops; JVD present with +HJR, 1-2+ edema noted of BLE  GI: Bowel sound present in all quadrants, abdomen distended with pain on deep palpation around lower quadrant  MSK: Full ROM of all extremities and spine w/o limitation or discomfort  Derm: No rashes, abnormal bruising, or skin lesions  Neuro: AAOx3; motor/sensory function intact  Psych: Anxious and agitated    Lines/Drains/Airways       Peripherally Inserted Central Catheter Line  Duration             PICC Triple Lumen 04/24/25 0942 right brachial 13 days              Drain  Duration                  Urethral Catheter 05/05/25 0808 Silicone 16 Fr. 2 days                  Significant Labs:  Lab Results   Component Value Date    WBC 9.53 05/08/2025    HGB 7.6 (L) 05/08/2025    HCT 23.2 (L) 05/08/2025    MCV 74.6 (L) 05/08/2025     05/08/2025       BMP  Lab Results   Component Value Date     (L) 05/08/2025    K 3.2 (L) 05/08/2025    CO2 36 (H) 05/08/2025    BUN 59.9 (H) 05/08/2025    CREATININE 1.60 (H) 05/08/2025    CALCIUM 9.5 05/08/2025    AGAP 16.0 05/08/2025    EGFRNONAA 88 (L) 12/06/2021     ABG  Recent Labs   Lab 05/06/25  0753   PH 7.550*   PO2 40.0*   PCO2 38.0   HCO3 33.2*     Mechanical Ventilation Support:  Oxygen Concentration (%): 36 (05/08/25 0706)    Significant Imaging:  I have reviewed the pertinent imaging within the past 24 hours.    Assessment/Plan:     Assessment  Cardiogenic Shock  Lactic Acidosis   HFrEF (22%, G3DD), Pulmonary  Hypertension  NOCAD, NICMO  NSTEMI likely demand  Chronic persistent Afib  Hx of VT arrest (has life vest)  ELIZABETH   Adrenal Insufficiency   Low cortisol on ata stim test and normal plast ACTH level  COPD   Microcytic Anemia   Low TSAT 7%  Prolonged QTC  Groin itch  Anxiety/Insomnia     Plan  - Continue ongoing ICU level of care  - Appreciate cardiology recommendations  - Decrease Dobutamine to 2.5 mcg/min  - Previously on Bumex infusion; continue Bumex 3 mg BID. Consider restarting gtt if lactate rises   - Continue Metolazone 5 mg once daily  - Discontinue ASA, Continue plavix and statin  - Continue Xarelto 20 mg daily  - Hold beta blocker until patient euvolemic again; Midodrine discontinued.  - Avoid nephrotoxins   - Continue Lev albuterol and ipratropium q12h  - Strict I&O and daily weights  - LDSSI added today  - Continue Miconazole powder BID   - Continue Seroquel to 100 mg qhs; Ativan prn ordered  - Continue IV Hydrocortisone 50 mg q12h     CODE STATUS: Full Code  Access: PICC and PIV  Antibiotics: none  Diet: Cardiac  DVT Prophylaxis: Xarelto 20 mg  GI Prophylaxis: PPI  Fluids: None     32 minutes of critical care was time spent personally by me on the following activities: development of treatment plan with patient or surrogate and bedside caregivers, discussions with consultants, evaluation of patient's response to treatment, examination of patient, ordering and performing treatments and interventions, ordering and review of laboratory studies, ordering and review of radiographic studies, pulse oximetry, re-evaluation of patient's condition.  This critical care time did not overlap with that of any other provider or involve time for any procedures.     Abiodun Recinos DO PGY-2  Pulmonary Critical Care Medicine  [facility] - [facility] Surg Telemetry         [1]   Social History  Socioeconomic History    Marital status: Single   Tobacco Use    Smoking status: Every Day     Current packs/day: 0.50      Average packs/day: 0.8 packs/day for 50.3 years (40.9 ttl pk-yrs)     Types: Cigarettes     Start date: 1975     Passive exposure: Current    Smokeless tobacco: Never    Tobacco comments:     States smoke 2-3 cigarettes per day   Substance and Sexual Activity    Alcohol use: Yes     Alcohol/week: 3.0 standard drinks of alcohol     Types: 3 Cans of beer per week     Comment: socially    Drug use: Not Currently     Frequency: 1.0 times per week     Types: Marijuana     Comment: no use in over 1 year    Sexual activity: Not Currently     Partners: Female     Social Drivers of Health     Financial Resource Strain: Low Risk  (4/23/2025)    Overall Financial Resource Strain (CARDIA)     Difficulty of Paying Living Expenses: Not hard at all   Food Insecurity: No Food Insecurity (4/23/2025)    Hunger Vital Sign     Worried About Running Out of Food in the Last Year: Never true     Ran Out of Food in the Last Year: Never true   Transportation Needs: No Transportation Needs (4/23/2025)    PRAPARE - Transportation     Lack of Transportation (Medical): No     Lack of Transportation (Non-Medical): No   Physical Activity: Inactive (12/17/2024)    Exercise Vital Sign     Days of Exercise per Week: 0 days     Minutes of Exercise per Session: 0 min   Stress: No Stress Concern Present (4/23/2025)    [employer] - Occupational Stress Questionnaire     Feeling of Stress : Not at all   Housing Stability: Low Risk  (4/23/2025)    Housing Stability Vital Sign     Unable to Pay for Housing in the Last Year: No     Homeless in the Last Year: No

## 2025-05-09 LAB
ANION GAP SERPL CALC-SCNC: 15 MEQ/L
ANION GAP SERPL CALC-SCNC: 15 MEQ/L
ANION GAP SERPL CALC-SCNC: 16 MEQ/L
ANION GAP SERPL CALC-SCNC: 16 MEQ/L
ANION GAP SERPL CALC-SCNC: 17 MEQ/L
ANION GAP SERPL CALC-SCNC: 17 MEQ/L
BASOPHILS # BLD AUTO: 0 X10(3)/MCL
BASOPHILS NFR BLD AUTO: 0 %
BSA FOR ECHO PROCEDURE: 2.12 M2
BUN SERPL-MCNC: 60.8 MG/DL (ref 8.4–25.7)
BUN SERPL-MCNC: 61.4 MG/DL (ref 8.4–25.7)
BUN SERPL-MCNC: 61.5 MG/DL (ref 8.4–25.7)
BUN SERPL-MCNC: 61.7 MG/DL (ref 8.4–25.7)
BUN SERPL-MCNC: 61.7 MG/DL (ref 8.4–25.7)
BUN SERPL-MCNC: 62.3 MG/DL (ref 8.4–25.7)
CALCIUM SERPL-MCNC: 9.4 MG/DL (ref 8.8–10)
CALCIUM SERPL-MCNC: 9.4 MG/DL (ref 8.8–10)
CALCIUM SERPL-MCNC: 9.6 MG/DL (ref 8.8–10)
CALCIUM SERPL-MCNC: 9.7 MG/DL (ref 8.8–10)
CALCIUM SERPL-MCNC: 9.8 MG/DL (ref 8.8–10)
CALCIUM SERPL-MCNC: 9.8 MG/DL (ref 8.8–10)
CHLORIDE SERPL-SCNC: 78 MMOL/L (ref 98–107)
CHLORIDE SERPL-SCNC: 79 MMOL/L (ref 98–107)
CHLORIDE SERPL-SCNC: 80 MMOL/L (ref 98–107)
CHLORIDE SERPL-SCNC: 81 MMOL/L (ref 98–107)
CO2 SERPL-SCNC: 35 MMOL/L (ref 23–31)
CO2 SERPL-SCNC: 36 MMOL/L (ref 23–31)
CO2 SERPL-SCNC: 36 MMOL/L (ref 23–31)
CO2 SERPL-SCNC: 37 MMOL/L (ref 23–31)
CREAT SERPL-MCNC: 1.35 MG/DL (ref 0.72–1.25)
CREAT SERPL-MCNC: 1.47 MG/DL (ref 0.72–1.25)
CREAT SERPL-MCNC: 1.53 MG/DL (ref 0.72–1.25)
CREAT SERPL-MCNC: 1.56 MG/DL (ref 0.72–1.25)
CREAT SERPL-MCNC: 1.56 MG/DL (ref 0.72–1.25)
CREAT SERPL-MCNC: 1.57 MG/DL (ref 0.72–1.25)
CREAT/UREA NIT SERPL: 39
CREAT/UREA NIT SERPL: 39
CREAT/UREA NIT SERPL: 40
CREAT/UREA NIT SERPL: 40
CREAT/UREA NIT SERPL: 42
CREAT/UREA NIT SERPL: 45
CV ECHO LV RWT: 0.34 CM
ECHO LV POSTERIOR WALL: 1.1 CM (ref 0.6–1.1)
EOSINOPHIL # BLD AUTO: 0 X10(3)/MCL (ref 0–0.9)
EOSINOPHIL NFR BLD AUTO: 0 %
ERYTHROCYTE [DISTWIDTH] IN BLOOD BY AUTOMATED COUNT: 25.1 % (ref 11.5–17)
FRACTIONAL SHORTENING: 13.8 % (ref 28–44)
GFR SERPLBLD CREATININE-BSD FMLA CKD-EPI: 48 ML/MIN/1.73/M2
GFR SERPLBLD CREATININE-BSD FMLA CKD-EPI: 50 ML/MIN/1.73/M2
GFR SERPLBLD CREATININE-BSD FMLA CKD-EPI: 52 ML/MIN/1.73/M2
GFR SERPLBLD CREATININE-BSD FMLA CKD-EPI: 58 ML/MIN/1.73/M2
GLUCOSE SERPL-MCNC: 133 MG/DL (ref 82–115)
GLUCOSE SERPL-MCNC: 146 MG/DL (ref 82–115)
GLUCOSE SERPL-MCNC: 160 MG/DL (ref 82–115)
GLUCOSE SERPL-MCNC: 161 MG/DL (ref 82–115)
GLUCOSE SERPL-MCNC: 162 MG/DL (ref 82–115)
GLUCOSE SERPL-MCNC: 163 MG/DL (ref 82–115)
HCT VFR BLD AUTO: 23 % (ref 42–52)
HGB BLD-MCNC: 7.7 G/DL (ref 14–18)
HOLD SPECIMEN: NORMAL
HOLD SPECIMEN: NORMAL
IMM GRANULOCYTES # BLD AUTO: 0.05 X10(3)/MCL (ref 0–0.04)
IMM GRANULOCYTES NFR BLD AUTO: 0.5 %
INTERVENTRICULAR SEPTUM: 1 CM (ref 0.6–1.1)
LACTATE SERPL-SCNC: 1.7 MMOL/L (ref 0.5–2.2)
LACTATE SERPL-SCNC: 2.5 MMOL/L (ref 0.5–2.2)
LACTATE SERPL-SCNC: 2.8 MMOL/L (ref 0.5–2.2)
LEFT ATRIUM SIZE: 5.6 CM
LEFT INTERNAL DIMENSION IN SYSTOLE: 5.6 CM (ref 2.1–4)
LEFT VENTRICLE DIASTOLIC VOLUME INDEX: 100.95 ML/M2
LEFT VENTRICLE DIASTOLIC VOLUME: 213 ML
LEFT VENTRICLE MASS INDEX: 142.8 G/M2
LEFT VENTRICLE SYSTOLIC VOLUME INDEX: 73.9 ML/M2
LEFT VENTRICLE SYSTOLIC VOLUME: 156 ML
LEFT VENTRICULAR INTERNAL DIMENSION IN DIASTOLE: 6.5 CM (ref 3.5–6)
LEFT VENTRICULAR MASS: 301.3 G
LVED V (TEICH): 213.11 ML
LVES V (TEICH): 156.18 ML
LYMPHOCYTES # BLD AUTO: 0.52 X10(3)/MCL (ref 0.6–4.6)
LYMPHOCYTES NFR BLD AUTO: 5.5 %
MAGNESIUM SERPL-MCNC: 2.1 MG/DL (ref 1.6–2.6)
MAGNESIUM SERPL-MCNC: 2.1 MG/DL (ref 1.6–2.6)
MAGNESIUM SERPL-MCNC: 2.2 MG/DL (ref 1.6–2.6)
MAGNESIUM SERPL-MCNC: 2.3 MG/DL (ref 1.6–2.6)
MCH RBC QN AUTO: 25.1 PG (ref 27–31)
MCHC RBC AUTO-ENTMCNC: 33.5 G/DL (ref 33–36)
MCV RBC AUTO: 74.9 FL (ref 80–94)
MONOCYTES # BLD AUTO: 0.6 X10(3)/MCL (ref 0.1–1.3)
MONOCYTES NFR BLD AUTO: 6.4 %
MR PISA EROA: 0.67 CM2
NEUTROPHILS # BLD AUTO: 8.25 X10(3)/MCL (ref 2.1–9.2)
NEUTROPHILS NFR BLD AUTO: 87.6 %
NRBC BLD AUTO-RTO: 0.2 %
PHOSPHATE SERPL-MCNC: 3 MG/DL (ref 2.3–4.7)
PHOSPHATE SERPL-MCNC: 3.1 MG/DL (ref 2.3–4.7)
PHOSPHATE SERPL-MCNC: 3.2 MG/DL (ref 2.3–4.7)
PHOSPHATE SERPL-MCNC: 3.2 MG/DL (ref 2.3–4.7)
PHOSPHATE SERPL-MCNC: 3.6 MG/DL (ref 2.3–4.7)
PHOSPHATE SERPL-MCNC: 3.6 MG/DL (ref 2.3–4.7)
PISA MRMAX VEL: 4.63 M/S
PISA RADIUS: 1.11 CM
PISA VN NYQUIST MS: 0.4 M/S
PISA VN NYQUIST: 0.4 M/S
PLATELET # BLD AUTO: 160 X10(3)/MCL (ref 130–400)
PLATELETS.RETICULATED NFR BLD AUTO: 5.2 % (ref 0.9–11.2)
PMV BLD AUTO: ABNORMAL FL
POCT GLUCOSE: 178 MG/DL (ref 70–110)
POCT GLUCOSE: 195 MG/DL (ref 70–110)
POCT GLUCOSE: 226 MG/DL (ref 70–110)
POCT GLUCOSE: 293 MG/DL (ref 70–110)
POTASSIUM SERPL-SCNC: 2.9 MMOL/L (ref 3.5–5.1)
POTASSIUM SERPL-SCNC: 3.1 MMOL/L (ref 3.5–5.1)
POTASSIUM SERPL-SCNC: 3.2 MMOL/L (ref 3.5–5.1)
POTASSIUM SERPL-SCNC: 3.3 MMOL/L (ref 3.5–5.1)
POTASSIUM SERPL-SCNC: 3.5 MMOL/L (ref 3.5–5.1)
POTASSIUM SERPL-SCNC: 3.6 MMOL/L (ref 3.5–5.1)
RBC # BLD AUTO: 3.07 X10(6)/MCL (ref 4.7–6.1)
SODIUM SERPL-SCNC: 130 MMOL/L (ref 136–145)
SODIUM SERPL-SCNC: 131 MMOL/L (ref 136–145)
SODIUM SERPL-SCNC: 132 MMOL/L (ref 136–145)
TDI SEPTAL: 0.06 M/S
WBC # BLD AUTO: 9.42 X10(3)/MCL (ref 4.5–11.5)
Z-SCORE OF LEFT VENTRICULAR DIMENSION IN END DIASTOLE: -0.17
Z-SCORE OF LEFT VENTRICULAR DIMENSION IN END SYSTOLE: 2.59

## 2025-05-09 PROCEDURE — 63600175 PHARM REV CODE 636 W HCPCS

## 2025-05-09 PROCEDURE — 94640 AIRWAY INHALATION TREATMENT: CPT

## 2025-05-09 PROCEDURE — 80048 BASIC METABOLIC PNL TOTAL CA: CPT

## 2025-05-09 PROCEDURE — 25000003 PHARM REV CODE 250

## 2025-05-09 PROCEDURE — 20000000 HC ICU ROOM

## 2025-05-09 PROCEDURE — 83605 ASSAY OF LACTIC ACID: CPT

## 2025-05-09 PROCEDURE — 99233 SBSQ HOSP IP/OBS HIGH 50: CPT | Mod: GC,,, | Performed by: INTERNAL MEDICINE

## 2025-05-09 PROCEDURE — 27000221 HC OXYGEN, UP TO 24 HOURS

## 2025-05-09 PROCEDURE — 36415 COLL VENOUS BLD VENIPUNCTURE: CPT

## 2025-05-09 PROCEDURE — 83735 ASSAY OF MAGNESIUM: CPT

## 2025-05-09 PROCEDURE — 97168 OT RE-EVAL EST PLAN CARE: CPT

## 2025-05-09 PROCEDURE — 84100 ASSAY OF PHOSPHORUS: CPT

## 2025-05-09 PROCEDURE — 94761 N-INVAS EAR/PLS OXIMETRY MLT: CPT

## 2025-05-09 PROCEDURE — 85025 COMPLETE CBC W/AUTO DIFF WBC: CPT

## 2025-05-09 PROCEDURE — 25000242 PHARM REV CODE 250 ALT 637 W/ HCPCS

## 2025-05-09 PROCEDURE — 25000003 PHARM REV CODE 250: Performed by: STUDENT IN AN ORGANIZED HEALTH CARE EDUCATION/TRAINING PROGRAM

## 2025-05-09 PROCEDURE — 27000207 HC ISOLATION

## 2025-05-09 PROCEDURE — 97164 PT RE-EVAL EST PLAN CARE: CPT

## 2025-05-09 RX ORDER — POTASSIUM CHLORIDE 14.9 MG/ML
80 INJECTION INTRAVENOUS ONCE
Status: DISCONTINUED | OUTPATIENT
Start: 2025-05-09 | End: 2025-05-09

## 2025-05-09 RX ORDER — POTASSIUM CHLORIDE 14.9 MG/ML
20 INJECTION INTRAVENOUS
Status: COMPLETED | OUTPATIENT
Start: 2025-05-09 | End: 2025-05-10

## 2025-05-09 RX ORDER — POTASSIUM CHLORIDE 20 MEQ/1
40 TABLET, EXTENDED RELEASE ORAL ONCE
Status: COMPLETED | OUTPATIENT
Start: 2025-05-09 | End: 2025-05-09

## 2025-05-09 RX ADMIN — SPIRONOLACTONE 25 MG: 25 TABLET, FILM COATED ORAL at 08:05

## 2025-05-09 RX ADMIN — HYDROCORTISONE SODIUM SUCCINATE 50 MG: 100 INJECTION, POWDER, FOR SOLUTION INTRAMUSCULAR; INTRAVENOUS at 08:05

## 2025-05-09 RX ADMIN — CLOPIDOGREL BISULFATE 75 MG: 75 TABLET, FILM COATED ORAL at 08:05

## 2025-05-09 RX ADMIN — POTASSIUM CHLORIDE 20 MEQ: 14.9 INJECTION, SOLUTION INTRAVENOUS at 06:05

## 2025-05-09 RX ADMIN — POTASSIUM CHLORIDE 20 MEQ: 14.9 INJECTION, SOLUTION INTRAVENOUS at 09:05

## 2025-05-09 RX ADMIN — Medication 1000 UNITS: at 08:05

## 2025-05-09 RX ADMIN — ATORVASTATIN CALCIUM 80 MG: 40 TABLET, FILM COATED ORAL at 08:05

## 2025-05-09 RX ADMIN — HYDROCORTISONE SODIUM SUCCINATE 50 MG: 100 INJECTION, POWDER, FOR SOLUTION INTRAMUSCULAR; INTRAVENOUS at 09:05

## 2025-05-09 RX ADMIN — PANTOPRAZOLE SODIUM 40 MG: 40 TABLET, DELAYED RELEASE ORAL at 08:05

## 2025-05-09 RX ADMIN — IPRATROPIUM BROMIDE 0.5 MG: 0.5 SOLUTION RESPIRATORY (INHALATION) at 06:05

## 2025-05-09 RX ADMIN — POTASSIUM CHLORIDE 20 MEQ: 14.9 INJECTION, SOLUTION INTRAVENOUS at 10:05

## 2025-05-09 RX ADMIN — POTASSIUM CHLORIDE 10 MEQ: 7.46 INJECTION, SOLUTION INTRAVENOUS at 01:05

## 2025-05-09 RX ADMIN — MICONAZOLE NITRATE: 20 POWDER TOPICAL at 09:05

## 2025-05-09 RX ADMIN — IPRATROPIUM BROMIDE 0.5 MG: 0.5 SOLUTION RESPIRATORY (INHALATION) at 08:05

## 2025-05-09 RX ADMIN — POTASSIUM CHLORIDE 10 MEQ: 7.46 INJECTION, SOLUTION INTRAVENOUS at 12:05

## 2025-05-09 RX ADMIN — LEVALBUTEROL HYDROCHLORIDE 1.25 MG: 1.25 SOLUTION RESPIRATORY (INHALATION) at 06:05

## 2025-05-09 RX ADMIN — POTASSIUM CHLORIDE 40 MEQ: 1500 TABLET, EXTENDED RELEASE ORAL at 05:05

## 2025-05-09 RX ADMIN — POTASSIUM CHLORIDE 20 MEQ: 14.9 INJECTION, SOLUTION INTRAVENOUS at 05:05

## 2025-05-09 RX ADMIN — RIVAROXABAN 20 MG: 10 TABLET, FILM COATED ORAL at 05:05

## 2025-05-09 RX ADMIN — LEVALBUTEROL HYDROCHLORIDE 1.25 MG: 1.25 SOLUTION RESPIRATORY (INHALATION) at 08:05

## 2025-05-09 RX ADMIN — MICONAZOLE NITRATE: 20 POWDER TOPICAL at 08:05

## 2025-05-09 RX ADMIN — INSULIN ASPART 1 UNITS: 100 INJECTION, SOLUTION INTRAVENOUS; SUBCUTANEOUS at 08:05

## 2025-05-09 RX ADMIN — INSULIN ASPART 3 UNITS: 100 INJECTION, SOLUTION INTRAVENOUS; SUBCUTANEOUS at 05:05

## 2025-05-09 RX ADMIN — FOLIC ACID 1 MG: 1 TABLET ORAL at 08:05

## 2025-05-09 RX ADMIN — QUETIAPINE FUMARATE 100 MG: 100 TABLET ORAL at 08:05

## 2025-05-09 RX ADMIN — HYDROCORTISONE SODIUM SUCCINATE 50 MG: 100 INJECTION, POWDER, FOR SOLUTION INTRAMUSCULAR; INTRAVENOUS at 02:05

## 2025-05-09 NOTE — PLAN OF CARE
Problem: Occupational Therapy  Goal: Occupational Therapy Goal  Description:   Pt will ambulate to bathroom with rollator SBA  Pt will complete toilet t/f SBA  Pt will complete toileting tasks SBA  Pt will complete UE/LE dressing mod I  Pt will increase standing endurance x 5 minutes.   Outcome: Progressing

## 2025-05-09 NOTE — PT/OT/SLP RE-EVAL
Physical Therapy Re-Evaluation    Patient Name:  Ran Reyes Jr.   MRN:  [id number]    Recommendations     Therapy Intensity Recommendations at Discharge: Moderate Intensity Therapy  Discharge Equipment Recommendations: none   Equipment to be obtained for discharge: none.  Barriers to discharge: fall risk, severity of deficits, level of skilled assistance required, decreased endurance, and complicated medical history    Assessment     Ran Reyes Jr. is a 69 y.o. male admitted with a medical diagnosis of:  1. ELIZABETH (acute kidney injury)    2. Weakness    3. Dyspnea    4. Screening due    5. HFrEF (heart failure with reduced ejection fraction)    6. Acidemia    7. Microcytic anemia    8. Tobacco dependency    9. Acute on chronic combined systolic and diastolic congestive heart failure    10. Bradycardia    11. QT prolongation    12. Heart failure with reduced ejection fraction    13. Heart failure    14. At risk for long QT syndrome    15. Chest pain    16. Acute combined systolic and diastolic congestive heart failure    17. Nonrheumatic mitral valve regurgitation       Problem List[1]   He presents with the following impairments/functional limitations:  weakness, impaired endurance, impaired self care skills, impaired functional mobility, decreased lower extremity function, gait instability, impaired balance, impaired cardiopulmonary response to activity.    Rehab Prognosis: Fair.    Patient would benefit from continued skilled acute PT services to: address above listed impairments/functional limitations; receive patient/caregiver education; reduce fall risk; and maximize independency/safety with functional mobility.    Recent Surgery: * No surgery found *      Plan     During this hospitalization, patient to be seen 5 x/week to address the identified impairments/functional limitations via gait training, therapeutic activities, therapeutic exercises and progress toward the established goals.    Plan of Care  "Expires:   (Discharge)    Plan of Care reviewed with: patient    Subjective     Communicated with patient's nurse (Moe) prior to session.    Patient agreeable to participate in re-evaluation.    Chief Complaint: "It takes me awhile to get moving."  Patient/Family Comments/goals: Get better.  Pain/Comfort:  Pain Rating 1: 0/10  Pain Rating Post-Intervention 1: 0/10    Patients cultural, spiritual, Mandaen conflicts given the current situation: no    Objective     Patient found supine in bed, with HOB elevated, and with bed rails up bilateral HOB, left FOB, and right FOB with blood pressure cuff, pulse ox (continuous), telemetry, PICC line, booth catheter (Life vest)  upon PT entry to room.    General Precautions: Standard, fall   Orthopedic Precautions:N/A   Braces:  N/A  Respiratory Status: 2 liters/min O2 via nasal cannula    Exams:  Orientation: Patient is oriented to person, place, time, situation  Commands: Patient follows commands consistently  Sensation:    -     pt. Reported numbness and tingling in B feet and ankles  Skin Integrity/Edema:     -       Skin integrity: Visible skin intact  -       Edema: None noted bilateral lower extremities  BILAT UE ROM/strength - defer to OT - see OT note for details  RLE ROM: WFL  RLE Strength: hip flexion 3-/5, knee flexion 3-/5, knee extension 3+/5  LLE ROM: WFL  LLE Strength: hip flexion 3-/5, knee flexion 3-/5, knee extension 3+/5    Functional Mobility:    Bed Mobility:  Supine to Sit: minimum assistance    Transfers:  Sit to Stand: minimum assistance with hand-held assist  Stand to Sit: minimum assistance with hand-held assist  Bedside Commode Transfer: minimum assistance with rollator using Stand Pivot    Gait:  Patient ambulated 8ft with rollator and minimum assistance.  Patient demonstrates :       steady gait       no loss of balance       no mis-steps       flexed posture.    Other Mobility:  N/A    Balance:  Sit  Static: GOOD: Takes MODERATE challenges " from all directions  Dynamic: GOOD: Maintains balance through MODERATE excursions of active trunk movement  Stand  Static: POOR+: Needs MINIMAL assist to maintain  Dynamic: POOR+: Needs MIN (minimal ) assist during gait    Additional Treatment Session  N/A    Patient left sitting edge of bed with all lines intact, call button in reach, tray table at bedside, and patient's nurse notified.    DME Justifications:  No DME recommended requiring DME justifications    Education     Patient educated on the importance of early mobility to prevent functional decline during hospital stay.  Patient educated on and assisted with functional mobility as noted above.  Patient educated on PT Plan of Care and role of PT in acute care.  Patient was instructed to utilize staff assistance for mobility/transfers.  White board updated regarding patient's safest level of mobility with staff assistance    Goals     Multidisciplinary Problems       Physical Therapy Goals          Problem: Physical Therapy    Goal Priority Disciplines Outcome Interventions   Physical Therapy Goal     PT, PT/OT Progressing    Description: REVIEWED 2025  Goals to be met by: DISCHARGE  Patient will increase functional independence with mobility by performin. Sit <=> stand w/ RW at [facility]. - ONGOING  2. Bed <=> chair w/ RW at [facility]. - ONGOING  3. Ambulate 130' w/ RW at [facility]. - ONGOING                     History     Past Medical History:   Diagnosis Date    A-fib     Anticoagulant long-term use     Anxiety     Aortic aneurysm     Cataract     CHF (congestive heart failure)     Chronic atrial fibrillation     COPD (chronic obstructive pulmonary disease)     Coronary artery disease     Depression     HLD (hyperlipidemia)     Hypertension     Mitral regurgitation     PAD (peripheral artery disease)     Primary open angle glaucoma (POAG)      Past Surgical History:   Procedure Laterality Date    ANGIOGRAM, CORONARY, WITH LEFT HEART CATHETERIZATION N/A  03/26/2024    Procedure: Angiogram, Coronary, with Left Heart Cath;  Surgeon: Adriano Montiel MD;  Location: [facility] CATH LAB;  Service: Cardiology;  Laterality: N/A;    ATHERECTOMY OF PERIPHERAL VESSEL Left 09/12/2022    LEFT SFA ATHERECTOMY, BALLOON ANGIOPLASTY    CATARACT EXTRACTION W/  INTRAOCULAR LENS IMPLANT Left 03/09/2023    Procedure: EXTRACTION, CATARACT, WITH IOL INSERTION;  Surgeon: Georgi Borja MD;  Location: [facility];  Service: Ophthalmology;  Laterality: Left;  19.5  mac    EGD, WITH CLOSED BIOPSY  03/22/2024    Procedure: EGD, WITH CLOSED BIOPSY;  Surgeon: Ronald Calderon MD;  Location: [facility] ENDOSCOPY;  Service: Gastroenterology;;    ESOPHAGOGASTRODUODENOSCOPY N/A 03/22/2024    Procedure: EGD;  Surgeon: Ronald Calderon MD;  Location: [facility] ENDOSCOPY;  Service: Gastroenterology;  Laterality: N/A;    Heart Stent N/A     > 10yrs. AGO    INCISION AND DRAINAGE N/A 03/18/2024    Procedure: Incision and Drainage;  Surgeon: Fahad Rivas MD;  Location: [facility];  Service: Urology;  Laterality: N/A;  I&D SCROTAL ABSCESS    INSERTION OF STENT INTO PERIPHERAL VESSEL Right 10/17/2022    RIGHT SFA ATHERECTOMY, BALLOON ANGIOPLASTY, STENT; RIGHT ANTERIOR TIBIAL ARTERY ATHERECTOMY, BALLOON ANGIOPLASTY    ORCHIECTOMY Left 03/18/2024    Procedure: ORCHIECTOMY;  Surgeon: Fahad Rivas MD;  Location: [facility];  Service: Urology;  Laterality: Left;     Time Tracking     PT Received On: 05/09/25  PT Start Time: 1118     PT Stop Time: 1154  PT Total Time (min): 36 min     Billable Minutes: Re-eval 36 minutes      05/09/2025       [1]   Patient Active Problem List  Diagnosis    Primary open angle glaucoma (POAG) of right eye, moderate stage    Combined forms of age-related cataract of left eye    Arteriosclerosis of coronary artery    Chronic atrial fibrillation    Dyslipidemia    Hypertension    Tobacco use    PVD (peripheral vascular disease)    VHD (valvular heart disease)     COVID-19    HFrEF (heart failure with reduced ejection fraction)    Nonrheumatic mitral valve regurgitation    Postoperative eye state    Scrotal hematoma    Chronic obstructive pulmonary disease, unspecified    Lesion of external ear    Low back pain    Other thrombophilia    Secondary hyperaldosteronism    Positive colorectal cancer screening using Cologuard test    Acute heart failure    History of COPD    CHF (congestive heart failure)    Acute cystitis with hematuria    Tobacco dependency    Moderate malnutrition

## 2025-05-09 NOTE — PLAN OF CARE
Problem: Skin Injury Risk Increased  Goal: Skin Health and Integrity  Outcome: Progressing     Problem: Adult Inpatient Plan of Care  Goal: Plan of Care Review  Outcome: Progressing  Goal: Patient-Specific Goal (Individualized)  Outcome: Progressing  Goal: Absence of Hospital-Acquired Illness or Injury  Outcome: Progressing  Goal: Optimal Comfort and Wellbeing  Outcome: Progressing  Goal: Readiness for Transition of Care  Outcome: Progressing     Problem: COPD (Chronic Obstructive Pulmonary Disease)  Goal: Optimal Chronic Illness Coping  Outcome: Progressing  Goal: Optimal Level of Functional [place]  Outcome: Progressing  Goal: Absence of Infection Signs and Symptoms  Outcome: Progressing  Goal: Improved Oral Intake  Outcome: Progressing     Problem: Heart Failure  Goal: Optimal Coping  Outcome: Progressing  Goal: Optimal Cardiac Output  Outcome: Progressing  Goal: Stable Heart Rate and Rhythm  Outcome: Progressing  Goal: Optimal Functional Ability  Outcome: Progressing  Goal: Fluid and Electrolyte Balance  Outcome: Progressing  Goal: Improved Oral Intake  Outcome: Progressing     Problem: Fall Injury Risk  Goal: Absence of Fall and Fall-Related Injury  Outcome: Progressing     Problem: Wound  Goal: Optimal Coping  Outcome: Progressing  Goal: Optimal Functional Ability  Outcome: Progressing  Goal: Absence of Infection Signs and Symptoms  Outcome: Progressing  Goal: Improved Oral Intake  Outcome: Progressing  Goal: Optimal Pain Control and Function  Outcome: Progressing  Goal: Skin Health and Integrity  Outcome: Progressing  Goal: Optimal Wound Healing  Outcome: Progressing     Problem: Acute Kidney Injury/Impairment  Goal: Fluid and Electrolyte Balance  Outcome: Progressing  Goal: Improved Oral Intake  Outcome: Progressing  Goal: Effective Renal Function  Outcome: Progressing     Problem: Dysrhythmia  Goal: Normalized Cardiac Rhythm  Outcome: Progressing     Problem: Infection  Goal: Absence of Infection  Signs and Symptoms  Outcome: Progressing

## 2025-05-09 NOTE — PROGRESS NOTES
[facility] - [address] [place] [address] Telemetry  Pulmonary Critical Care Note    Patient Name: Ran Reyes Jr.  MRN: [id number]  Admission Date: 4/21/2025  Hospital Length of Stay: 17 days  Code Status: Full Code  Attending Provider: Trisha German MD  Primary Care Provider: Abiodun Recinos DO     Subjective:     HPI:   Ran Reyes Jr. is a 69 y.o. 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 Patient reports he can only ambulate about 20-30 feet before having to stop due to claudication pain which is chronic and unchanged compared to baseline. Since running out of his diuretic medications approximately 5 days ago he has noted progressively worsening lower extremity swelling causing leg heaviness and weakness exacerbating difficulty ambulating.  He also notes acute on chronic cough productive of a brownish sputum without hemoptysis.  Denies fever, chills, sweats.  Denies chest pain.  Endorses shortness of breath at rest and with ambulation but unchanged compared to baseline.  Denies abdominal pain, nausea, vomiting, constipation, diarrhea.  Denies increased or decreased urinary frequency, dysuria, or hematuria.  Sleeps with 2 pillows at night due to baseline orthopnea. Has not had to increase number of pillows or change sleeping habits. Wears 2L NC home oxygen at baseline for hx of COPD. Denies having increased oxygen requirements prior to ED presentation.     ED course:  Vital signs stable.  Oxygenation  stable on 2 L nasal cannula.  CBC shows microcytic anemic (HGB 9.0; MCV 77).  CMP shows hyponatremia (Na 128), acidemia (CO2 14), elevated renal indices (BUN 25; creatinine 1.93), elevated alkaline phosphatase (). Troponin WNL.  BNP 26 39.5.  UDS negative.  EKG showed atrial fibrillation with PVCs.  Chest x-ray difficult to interpret due to overlying medical devices however appears to show cardiomegaly with bilateral pulmonary vascular congestion with question of bilateral pleural effusions. He was admitted for acute CHF exacerbation and cardiorenal syndrome.        Hospital Course/Significant events:  4/23/25: Admit to ICU, started on pressors and inotropes for cardiogenic / septic shock   5/2/25: Downgraded to    5/3/25: Re-upgraded to ICU 2/2 worsening lactate and agonal breathing     24 Hour Interval History:  No acute events overnight.  Vital signs stable on 2-3 L nasal cannula satting well. Patient remained on Dobutamine 5 mcg/min overnight. Most recent lactic acid normal at 1.7.  BMP was noted with contraction alkalosis thus, IV Bumex bolus was held. However UOP of 4.8 L with now net negative 16L. Patient sitting on side of bed and doing well. No more issues with agitation overnight. A&O x 3. He has no acute complaints. CBC stable from prior. Most recent BMP with hyponatremia of 131, potassium at 3.5, hypochloremic metabolic alkalosis with bicarb of 35. Renal indices stable at 61.5/1.56.       Past Medical History:   Diagnosis Date    A-fib     Anticoagulant long-term use     Anxiety     Aortic aneurysm     Cataract     CHF (congestive heart failure)     Chronic atrial fibrillation     COPD (chronic obstructive pulmonary disease)     Coronary artery disease     Depression     HLD (hyperlipidemia)     Hypertension     Mitral regurgitation     PAD (peripheral artery disease)     Primary open angle glaucoma (POAG)        Past Surgical History:   Procedure Laterality Date    ANGIOGRAM, CORONARY, WITH  LEFT HEART CATHETERIZATION N/A 03/26/2024    Procedure: Angiogram, Coronary, with Left Heart Cath;  Surgeon: Adriano Montiel MD;  Location: [facility] CATH LAB;  Service: Cardiology;  Laterality: N/A;    ATHERECTOMY OF PERIPHERAL VESSEL Left 09/12/2022    LEFT SFA ATHERECTOMY, BALLOON ANGIOPLASTY    CATARACT EXTRACTION W/  INTRAOCULAR LENS IMPLANT Left 03/09/2023    Procedure: EXTRACTION, CATARACT, WITH IOL INSERTION;  Surgeon: Georgi Borja MD;  Location: [facility];  Service: Ophthalmology;  Laterality: Left;  19.5  mac    EGD, WITH CLOSED BIOPSY  03/22/2024    Procedure: EGD, WITH CLOSED BIOPSY;  Surgeon: Ronald Calderon MD;  Location: [facility] ENDOSCOPY;  Service: Gastroenterology;;    ESOPHAGOGASTRODUODENOSCOPY N/A 03/22/2024    Procedure: EGD;  Surgeon: Ronald Calderon MD;  Location: [facility] ENDOSCOPY;  Service: Gastroenterology;  Laterality: N/A;    Heart Stent N/A     > 10yrs. AGO    INCISION AND DRAINAGE N/A 03/18/2024    Procedure: Incision and Drainage;  Surgeon: Fahad Rivas MD;  Location: [facility];  Service: Urology;  Laterality: N/A;  I&D SCROTAL ABSCESS    INSERTION OF STENT INTO PERIPHERAL VESSEL Right 10/17/2022    RIGHT SFA ATHERECTOMY, BALLOON ANGIOPLASTY, STENT; RIGHT ANTERIOR TIBIAL ARTERY ATHERECTOMY, BALLOON ANGIOPLASTY    ORCHIECTOMY Left 03/18/2024    Procedure: ORCHIECTOMY;  Surgeon: Fahad Rivas MD;  Location: [facility];  Service: Urology;  Laterality: Left;       Social History[1]    Current Outpatient Medications   Medication Instructions    acetaminophen 650 mg, 2 times daily PRN    ALBUTEROL INHL 2 puffs, Every 6 hours PRN    albuterol-ipratropium (DUO-NEB) 2.5 mg-0.5 mg/3 mL nebulizer solution 3 mLs, Nebulization, Every 6 hours PRN, Rescue    aspirin (ECOTRIN) 81 MG EC tablet 1 tablet, Daily    atorvastatin (LIPITOR) 80 mg, Oral, Daily    BREZTRI AEROSPHERE 160-9-4.8 mcg/actuation HFAA 2 puffs, 2 times daily    cetirizine (ZYRTEC) 10 mg, Oral, Daily     clopidogreL (PLAVIX) 75 mg, Oral, Daily    folic acid (FOLVITE) 1 mg, Oral, Daily    furosemide (LASIX) 40 mg, Oral, 2 times daily    gabapentin (NEURONTIN) 300 mg, Oral, 3 times daily    metoprolol succinate (TOPROL-XL) 12.5 mg, Oral, Daily    nicotine (NICODERM CQ) 14 mg/24 hr 1 patch, Transdermal, Daily PRN    nicotine (NICODERM CQ) 21 mg/24 hr 1 patch, Transdermal, Daily    nitrofurantoin, macrocrystal-monohydrate, (MACROBID) 100 MG capsule 100 mg, Oral, 2 times daily    olopatadine (PATANOL) 0.1 % ophthalmic solution Apply to eye.    pantoprazole (PROTONIX) 40 mg, Oral, 2 times daily    potassium chloride SA (K-DUR,KLOR-CON) 20 MEQ tablet 20 mEq, Oral, 2 times daily    rivaroxaban (XARELTO) 20 mg, Oral, Daily    sacubitriL-valsartan (ENTRESTO) 49-51 mg per tablet 1 tablet, Daily    sertraline (ZOLOFT) 100 mg, Oral, Daily    urea (CARMOL) 40 % Crea Apply topically.    varenicline tartrate (CHANTIX) 0.5 mg    vitamin D (VITAMIN D3) 1,000 Units, Daily     Current Inpatient Medications   atorvastatin  80 mg Oral Daily    clopidogreL  75 mg Oral Daily    folic acid  1 mg Oral Daily    hydrocortisone sodium succinate  50 mg Intravenous Q12H    ipratropium  0.5 mg Nebulization Q12H    levalbuterol  1.25 mg Nebulization Q12H    miconazole NITRATE 2 %   Topical (Top) BID    pantoprazole  40 mg Oral BID    QUEtiapine  100 mg Oral QHS    rivaroxaban  20 mg Oral Daily    spironolactone  25 mg Oral Daily    vitamin D  1,000 Units Oral Daily     Current Intravenous Infusions   0.9% NaCl   Intravenous Continuous   Stopped at 05/04/25 4944    DOBUTamine IV infusion (non-titrating)  2.5 mcg/kg/min Intravenous Continuous 3.4 mL/hr at 05/09/25 0633 2.5 mcg/kg/min at 05/09/25 0633     ROS: neg unless stated above        Objective:     Intake/Output Summary (Last 24 hours) at 5/9/2025 0640  Last data filed at 5/9/2025 0633  Gross per 24 hour   Intake 1781.54 ml   Output 4800 ml   Net -3018.46 ml     Vital Signs (Most  Recent):  Temp: 98 °F (36.7 °C) (05/09/25 0330)  Pulse: 81 (05/09/25 0600)  Resp: 19 (05/09/25 0600)  BP: 107/74 (05/09/25 0600)  SpO2: 96 % (05/09/25 0600)  Body mass index is 26.49 kg/m².  Weight: 88.6 kg (195 lb 5.2 oz) Vital Signs (24h Range):  Temp:  [98 °F (36.7 °C)-99.8 °F (37.7 °C)] 98 °F (36.7 °C)  Pulse:  [] 81  Resp:  [12-34] 19  SpO2:  [83 %-100 %] 96 %  BP: ()/(60-87) 107/74     Physical Exam:  General: Mild distress, on supplemental oxygen  HEENT: NC/AT; PERRL; scleral icterus, nasal and oral mucosa moist and clear  Pulm: + mild rales bibasilar and mild expiratory wheezing noted.  CV: S1, S2 irregular irregular w/o murmurs or gallops; JVD present with +HJR, trace to 1+ edema noted of BLE  GI: Bowel sound present in all quadrants, non tender and non distended   MSK: Full ROM of all extremities and spine w/o limitation or discomfort  Derm: No rashes, abnormal bruising, or skin lesions  Neuro: AAOx3; motor/sensory function intact  Psych: Anxious and agitated    Lines/Drains/Airways       Peripherally Inserted Central Catheter Line  Duration             PICC Triple Lumen 04/24/25 0942 right brachial 14 days              Drain  Duration                  Urethral Catheter 05/05/25 0808 Silicone 16 Fr. 3 days                  Significant Labs:  Lab Results   Component Value Date    WBC 9.42 05/09/2025    HGB 7.7 (L) 05/09/2025    HCT 23.0 (L) 05/09/2025    MCV 74.9 (L) 05/09/2025     05/09/2025       BMP  Lab Results   Component Value Date     (L) 05/09/2025    K 3.5 05/09/2025    CO2 35 (H) 05/09/2025    BUN 61.5 (H) 05/09/2025    CREATININE 1.56 (H) 05/09/2025    CALCIUM 9.4 05/09/2025    AGAP 17.0 05/09/2025    EGFRNONAA 88 (L) 12/06/2021     ABG  Recent Labs   Lab 05/08/25  1341   PH 7.610*   PO2 72.0*   PCO2 47.0*   HCO3 47.2*     Mechanical Ventilation Support:  Oxygen Concentration (%): 36 (05/08/25 0706)    Significant Imaging:  I have reviewed the pertinent imaging within the  past 24 hours.    Assessment/Plan:     Assessment  Cardiogenic Shock  Lactic Acidosis   HFrEF (22%, G3DD), Pulmonary Hypertension  NOCAD, NICMO  NSTEMI likely demand  Chronic persistent Afib  Hx of VT arrest (has life vest)  ELIZABETH   Adrenal Insufficiency   Low cortisol on ata stim test and normal plast ACTH level  COPD   Microcytic Anemia   Low TSAT 7%  Prolonged QTC  Groin itch  Anxiety/Insomnia     Plan  - Continue ongoing ICU level of care  - Appreciate cardiology recommendations  - Decreased Dobutamine to 2.5 mcg/min - will check Lactic acid in 2 hours time  - Bolus Bumex and metolazone discontinued yesterday 2/2 contraction alkalosis, will monitor and discuss with cardiology about resuming  - Continue with Aldactone 25 mg once daily. Will start Entresto when BP able to tolerate  - Keep K>4, Phos > 3 and Mg > 2  - Discontinue ASA, Continue plavix and statin  - Continue Xarelto 20 mg daily  - Hold beta blocker until patient euvolemic again; Midodrine discontinued.  - Avoid nephrotoxins   - Continue Lev albuterol and ipratropium q12h  - Strict I&O and daily weights  - LDSSI added today  - Continue Miconazole powder BID   - Continue Seroquel to 100 mg qhs; Ativan prn ordered  - Continue IV Hydrocortisone 50 mg q12h     CODE STATUS: Full Code  Access: PICC and PIV  Antibiotics: none  Diet: Cardiac  DVT Prophylaxis: Xarelto 20 mg  GI Prophylaxis: PPI  Fluids: None     32 minutes of critical care was time spent personally by me on the following activities: development of treatment plan with patient or surrogate and bedside caregivers, discussions with consultants, evaluation of patient's response to treatment, examination of patient, ordering and performing treatments and interventions, ordering and review of laboratory studies, ordering and review of radiographic studies, pulse oximetry, re-evaluation of patient's condition.  This critical care time did not overlap with that of any other provider or involve time for  any procedures.     Abiodun Recinos DO PGY-2  Pulmonary Critical Care Medicine  [facility] [address] [facility] Telemetry         [1]   Social History  Socioeconomic History    Marital status: Single   Tobacco Use    Smoking status: Every Day     Current packs/day: 0.50     Average packs/day: 0.8 packs/day for 50.4 years (40.9 ttl pk-yrs)     Types: Cigarettes     Start date: 1975     Passive exposure: Current    Smokeless tobacco: Never    Tobacco comments:     States smoke 2-3 cigarettes per day   Substance and Sexual Activity    Alcohol use: Yes     Alcohol/week: 3.0 standard drinks of alcohol     Types: 3 Cans of beer per week     Comment: socially    Drug use: Not Currently     Frequency: 1.0 times per week     Types: Marijuana     Comment: no use in over 1 year    Sexual activity: Not Currently     Partners: Female     Social Drivers of Health     Financial Resource Strain: Low Risk  (4/23/2025)    Overall Financial Resource Strain (CARDIA)     Difficulty of Paying Living Expenses: Not hard at all   Food Insecurity: No Food Insecurity (4/23/2025)    Hunger Vital Sign     Worried About Running Out of Food in the Last Year: Never true     Ran Out of Food in the Last Year: Never true   Transportation Needs: No Transportation Needs (4/23/2025)    PRAPARE - Transportation     Lack of Transportation (Medical): No     Lack of Transportation (Non-Medical): No   Physical Activity: Inactive (12/17/2024)    Exercise Vital Sign     Days of Exercise per Week: 0 days     Minutes of Exercise per Session: 0 min   Stress: No Stress Concern Present (4/23/2025)    [place] citizen [place] [employer] - Occupational Stress Questionnaire     Feeling of Stress : Not at all   Housing Stability: Low Risk  (4/23/2025)    Housing Stability Vital Sign     Unable to Pay for Housing in the Last Year: No     Homeless in the Last Year: No

## 2025-05-09 NOTE — PROGRESS NOTES
Inpatient Nutrition Assessment    Admit Date: 4/21/2025   Total duration of encounter: 18 days   Patient Age: 69 y.o.    Nutrition Recommendation/Prescription     Continue heart healthy/ 1.5 L fluid restriction; encouraged oral intake   continue boost plus -1 carton daily; Boost Plus (provides 360 kcal, 14 g protein per serving)   Electrolyte replacement as needed   MVI/fe  Biweekly wt  Pt education on diet complete  Will monitor nutrition status     Communication of Recommendations: reviewed with nurse and reviewed with patient    Nutrition Assessment     Malnutrition Assessment/Nutrition-Focused Physical Exam    Malnutrition Context: chronic illness (04/22/25 1459)  Malnutrition Level: moderate (04/22/25 1459)  Energy Intake (Malnutrition): less than 75% for greater than 7 days (04/22/25 1459)  Weight Loss (Malnutrition): other (see comments) (Does not meet criteria) (04/22/25 1459)  Subcutaneous Fat (Malnutrition): mild depletion (04/22/25 1459)  Orbital Region (Subcutaneous Fat Loss): mild depletion  Upper Arm Region (Subcutaneous Fat Loss): mild depletion     Muscle Mass (Malnutrition): mild depletion (04/22/25 1459)     Clavicle Bone Region (Muscle Loss): mild depletion         Fluid Accumulation (Malnutrition): mild (04/22/25 1459)     Hand  Strength, Right (Malnutrition): Unable to assess (04/22/25 1459)  A minimum of two characteristics is recommended for diagnosis of either severe or non-severe malnutrition.    Chart Review    Reason Seen: continuous nutrition monitoring and follow-up    Malnutrition Screening Tool Results   Have you recently lost weight without trying?: No  Have you been eating poorly because of a decreased appetite?: No   MST Score: 0   Diagnosis:  Afib, CAD, heart failure, pulmonary HTN, volume overload, hyponatremia, ELIZABETH, anion gap acidemia, abnormal UA, PVD, anemia, COPD, glaucoma ; cardiogenic shock , sepsis    Relevant Medical History: tobacco use, COPD, HTN, HLD, Afib, heart  failure, PVD, fourniers gangrene    Scheduled Medications:  atorvastatin, 80 mg, Daily  clopidogreL, 75 mg, Daily  folic acid, 1 mg, Daily  hydrocortisone sodium succinate, 50 mg, Q8H  ipratropium, 0.5 mg, Q12H  levalbuterol, 1.25 mg, Q12H  miconazole NITRATE 2 %, , BID  pantoprazole, 40 mg, BID  QUEtiapine, 100 mg, QHS  rivaroxaban, 20 mg, Daily  spironolactone, 25 mg, Daily  vitamin D, 1,000 Units, Daily    Continuous Infusions:  0.9% NaCl, Last Rate: Stopped (05/04/25 2254)  DOBUTamine IV infusion (non-titrating), Last Rate: 2.5 mcg/kg/min (05/09/25 0800)    PRN Medications:  acetaminophen, 650 mg, Q4H PRN  aluminum-magnesium hydroxide, 30 mL, BID PRN  dextrose 50%, 12.5 g, PRN  dextrose 50%, 25 g, PRN  glucagon (human recombinant), 1 mg, PRN  glucose, 16 g, PRN  glucose, 24 g, PRN  hydrALAZINE, 10 mg, Q4H PRN  hydrocortisone, , TID PRN  insulin aspart U-100, 0-5 Units, QID (AC + HS) PRN  lorazepam, 1 mg, Q6H PRN  melatonin, 6 mg, Nightly PRN  naloxone, 0.02 mg, PRN  nicotine, 1 patch, Daily PRN  polyethylene glycol, 17 g, Daily PRN  prochlorperazine, 5 mg, Q6H PRN  senna-docusate, 1 tablet, BID PRN  simethicone, 1 tablet, TID PRN  sodium chloride 0.9%, 10 mL, PRN  traZODone, 25 mg, Nightly PRN    Calorie Containing IV Medications: no significant kcals from medications at this time    Recent Labs   Lab 05/03/25  0542 05/03/25  1230 05/04/25  0215 05/04/25  1409 05/05/25  0206 05/05/25  1621 05/06/25  0318 05/06/25  0726 05/07/25  0426 05/07/25  0813 05/08/25  0439 05/08/25  0759 05/08/25  1233 05/08/25  1542 05/08/25  1950 05/09/25  0006 05/09/25  0407 05/09/25  0821   *   < > 131*   < > 131*   < > 134*   < > 135*   < > 131* 132* 132* 131* 133* 131* 131* 130*   K 4.3   < > 4.0   < > 4.3   < > 3.7   < > 2.7*   < > 3.2* 2.7* 3.1* 3.1* 3.0* 3.6 3.5 3.3*   CALCIUM 9.9   < > 9.4   < > 9.1   < > 9.2   < > 9.3   < > 9.5 9.3 9.5 9.5 9.6 9.4 9.4 9.7   PHOS 4.0  --  3.6  --  3.4   < > 3.1   < > 3.3   < > 3.8 3.8 3.5  3.1 3.1 3.0 3.1 3.6   MG 2.10  --  2.40  --  2.30   < > 2.20   < > 2.10   < > 2.10 2.00 2.10 2.10 2.20 2.20 2.10 2.30   CL 95*   < > 93*   < > 92*   < > 92*   < > 83*   < > 79* 78* 78* 78* 78* 78* 79* 79*   CO2 16*   < > 21*   < > 23   < > 23   < > 35*   < > 36* 38* 38* 37* 38* 37* 35* 35*   BUN 36.3*   < > 46.5*   < > 51.9*   < > 56.7*   < > 55.4*   < > 59.9* 57.5* 60.7* 61.7* 61.8* 61.7* 61.5* 61.7*   CREATININE 1.91*   < > 1.97*   < > 1.84*   < > 1.89*   < > 1.64*   < > 1.60* 1.50* 1.61* 1.63* 1.61* 1.56* 1.56* 1.53*   EGFRNORACEVR 38   < > 37   < > 40   < > 38   < > 46   < > 47 51 47 46 47 48 48 50   BILITOT 5.4*  --  5.0*  --  4.7*  --   --   --   --   --   --   --   --   --   --   --   --   --    ALKPHOS 213*  --  231*  --  220*  --   --   --   --   --   --   --   --   --   --   --   --   --    ALT 18  --  20  --  25  --   --   --   --   --   --   --   --   --   --   --   --   --    AST 36  --  45  --  62*  --   --   --   --   --   --   --   --   --   --   --   --   --    ALBUMIN 3.4  --  3.4  --  3.4  --   --   --   --   --   --   --   --   --   --   --   --   --    WBC 7.41  --  8.47  --  11.14  --  10.32  --  8.54  --  9.53  --   --   --   --   --  9.42  --    HGB 8.2*  --  7.7*  --  7.8*  --  7.6*  --  7.7*  --  7.6*  --   --   --   --   --  7.7*  --    HCT 25.8*  --  23.6*  --  24.2*  --  22.8*  --  22.9*  --  23.2*  --   --   --   --   --  23.0*  --     < > = values in this interval not displayed.     Nutrition Orders:  Diet Heart Healthy Fluid - 1500mL; Standard Tray  Dietary nutrition supplements Daily; Boost Plus Nutritional Drink - Rich Chocolate    Appetite/Oral Intake: fair/50-75% of meals  Factors Affecting Nutritional Intake: decreased appetite and shortness of breath  Social Needs Impacting Access to Food: none identified  Food/Jain/Cultural Preferences: none reported  Food Allergies: none reported  Last Bowel Movement: 05/09/25  Wound(s):  none    Comments  (5/9) Pt laying in bed; reported  appetite --fluctuating --up/down; ate part of breakfast this am; drinking boost supplement. Bed wt decreased 88.6kg--on diuretic; wt has been fluctuating. Encouraged oral intake/supplement use. Labs acknowledged: Gluc (H)--monitor; Alk Phos /Tbili remain elevated.     (5/5) Pt sitting in chair; reported appetite up/down; not liking food; encouraged pt to select food options with nutrition ; pt eating approx 50% meals but does drink ONS. Wt stable 91.5kg (5-3). Labs acknowledged: Gluc (H)--no hx DM; LFTs remain elevated. H/H (L)--on Fe supplement. + LE edema; on diuretic. Current diet/ONS appropriate.     (5/1) Pt reported he ate well for breakfast; depends what is on menu; appetite fair/good; drinking ONS; wt fluctuates with fluid; noted low K--on electrolyte repletion. Tbili remains elevated. Current diet tx appropriate.    (4/29) Pt sitting in chair during rounds ; eating peanut butter cookies; reported not liking food; wants gravy on meat; will honor request; ate eggs and sausage for breakfast; fair po intake; pt is drinking ONS. No new wt--in chair/unable to weigh. H/h(L)--consider fe : Tbili remains elevated. Pt remains on levophed.     (4/25) Pt tolerating oral diet; po intake remains fair; encouraged ONS; noted low K/Mg--suggest electrolyte repletion. Pt remains on levophed/pressor support. Tbili--elevated. Pt wt 196#; diuresis.     (4/22) Pt reported fair po intake; eating 50-75% meals; + LE edema; mild fat/muscle wasting; wt fluctuates with fluid; on diuretic; UBW between 205-225#. Pt willing to drink ONS; will order once daily --need to monitor fluid volume. Pt education complete on diet tx.     Anthropometrics    Height: 6' (182.9 cm), Height Method: Stated  Last Weight: 88.6 kg (195 lb 5.2 oz) (05/07/25 0600), Weight Method: Bed Scale  BMI (Calculated): 26.5  BMI Classification: overweight (BMI 25-29.9)        Ideal Body Weight (IBW), Male: 178 lb     % Ideal Body Weight, Male (lb): 109.37 %                  Usual Body Weight (UBW), k kg (wt fluctuates 205-225#; fluid)  % Usual Body Weight: 100     Usual Weight Provided By: patient and EMR weight history    Wt Readings from Last 5 Encounters:   25 88.6 kg (195 lb 5.2 oz)   25 102.1 kg (225 lb)   25 101.2 kg (223 lb)   25 102.5 kg (225 lb 14.4 oz)   02/15/25 103.6 kg (228 lb 6.3 oz)     Weight Change(s) Since Admission: -225#  Wt Readings from Last 1 Encounters:   25 0600 88.6 kg (195 lb 5.2 oz)   25 1928 93.7 kg (206 lb 9.1 oz)   25 0600 91.5 kg (201 lb 12.8 oz)   25 0700 90.3 kg (199 lb 1.2 oz)   25 0600 90.3 kg (199 lb 1.2 oz)   25 1030 88.3 kg (194 lb 10.7 oz)   25 0547 88.3 kg (194 lb 10.7 oz)   25 0600 89.1 kg (196 lb 6.9 oz)   25 0555 89.1 kg (196 lb 6.9 oz)   25 0502 87.8 kg (193 lb 9 oz)   25 0610 92.8 kg (204 lb 9.6 oz)   25 1709 96.6 kg (213 lb)   25 1137 97.5 kg (215 lb)   Admit Weight: 97.5 kg (215 lb) (25 1137), Weight Method: Stated    Estimated Needs    Weight Used For Calorie Calculations: 92.8 kg (204 lb 9.4 oz)  Energy Calorie Requirements (kcal): 2320 kcal/d; 25 tyron/kg  Energy Need Method: Kcal/kg  Weight Used For Protein Calculations: 92.8 kg (204 lb 9.4 oz)  Protein Requirements: 93 gm protein/d; 1 gm/kg  Fluid Requirements (mL): 2320 ml/d; 1ml/tyron        Enteral Nutrition     Patient not receiving enteral nutrition at this time.    Parenteral Nutrition     Patient not receiving parenteral nutrition support at this time.    Evaluation of Received Nutrient Intake    Calories: not meeting estimated needs; () po intake fair   Protein: not meeting estimated needs    Patient Education     Education Provided: heart healthy diet  Teaching Method: explanation and printed materials  Comprehension: verbalizes understanding  Barriers to Learning: none evident  Expected Compliance: fair  Comments: All questions were answered and  dietitian's contact information was provided.     Nutrition Diagnosis     PES: Inadequate oral intake related to chronic illness as evidenced by eating 75% or less meals . (active)     PES: Moderate chronic disease or condition related malnutrition Related to chronic illness  As Evidenced by:  - energy intake: < 75% for 3 weeks (meets criteria for < 75% for > 7 days (moderate - acute)) - muscle mass depletion: 2 areas of mild muscle loss (Trapezius, Clavicle) - loss of subcutaneous fat: 3 areas of mild fat loss (Buccal, Triceps Skinfold, Infraorbital) - fluid accumulation: 1 area of mild fluid accumulation (Lower extremities edema) active    Nutrition Interventions     Intervention(s): modified composition of meals/snacks, multivitamin/mineral supplement therapy, purpose of nutrition education, and collaboration with other providers  Intervention(s): Oral nutritional supplement;Nutrition education;Care coordination or referral;Oral diet/nutrient modifications    Goal: Meet greater than 80% of nutritional needs by follow-up. (goal progressing)  Goal: Maintain weight throughout hospitalization. (goal progressing)    Nutrition Goals & Monitoring     Dietitian will monitor: food and beverage intake, weight, and food/nutrition knowledge skill  Discharge planning: continue heart healthy diet with boost plus  oral supplements  Nutrition Risk/Follow-Up: patient at increased nutrition risk; dietitian will follow-up twice weekly   Please consult if re-assessment needed sooner.

## 2025-05-09 NOTE — PROGRESS NOTES
Cardiology Progress Note     Cardiology Attending: Dayanna Johnson MD  Cardiology Fellow: Vernon Cifuentes MD     Date of Admit: 4/21/2025    History of Present Illness:      Ran Reyes Jr. is a 69 y.o. male with NICM-HFrEF (LVEF 20-25%; G3DD), HTN, non obstructive CAD, AF on OAC, PAD, HLD, and COPD who presented with acute on chronic decompensated heart failure in cardiogenic shock.     No acute events reported overnight. The patient reports improved dyspnea since presentation but reports persistent orthopnea that he states is chronic and unchanged in nature. He denies anginal symptoms, pre-syncope, syncope, or palpitations. Negative 3.027 L over the last 24 hours. Negative 16.7 L since admission. Dobutamine increased back to 5 mcg/kg/min last night.    The patient reports he has not been eating much, only a portion of his meal. Doesn't like the food, denies early satiety.       Past Medical History:     Past Medical History:   Diagnosis Date    A-fib     Anticoagulant long-term use     Anxiety     Aortic aneurysm     Cataract     CHF (congestive heart failure)     Chronic atrial fibrillation     COPD (chronic obstructive pulmonary disease)     Coronary artery disease     Depression     HLD (hyperlipidemia)     Hypertension     Mitral regurgitation     PAD (peripheral artery disease)     Primary open angle glaucoma (POAG)      Surgical History:     Past Surgical History:   Procedure Laterality Date    ANGIOGRAM, CORONARY, WITH LEFT HEART CATHETERIZATION N/A 03/26/2024    Procedure: Angiogram, Coronary, with Left Heart Cath;  Surgeon: Adriano Montiel MD;  Location: [facility] CATH LAB;  Service: Cardiology;  Laterality: N/A;    ATHERECTOMY OF PERIPHERAL VESSEL Left 09/12/2022    LEFT SFA ATHERECTOMY, BALLOON ANGIOPLASTY    CATARACT EXTRACTION W/  INTRAOCULAR LENS IMPLANT Left 03/09/2023    Procedure: EXTRACTION, CATARACT, WITH IOL INSERTION;  Surgeon: Georgi Borja MD;  Location: [facility];  Service: Ophthalmology;   Laterality: Left;  19.5  mac    EGD, WITH CLOSED BIOPSY  03/22/2024    Procedure: EGD, WITH CLOSED BIOPSY;  Surgeon: Ronald Calderon MD;  Location: [facility] ENDOSCOPY;  Service: Gastroenterology;;    ESOPHAGOGASTRODUODENOSCOPY N/A 03/22/2024    Procedure: EGD;  Surgeon: Ronald Calderon MD;  Location: [facility] ENDOSCOPY;  Service: Gastroenterology;  Laterality: N/A;    Heart Stent N/A     > 10yrs. AGO    INCISION AND DRAINAGE N/A 03/18/2024    Procedure: Incision and Drainage;  Surgeon: Fahad Rivas MD;  Location: [facility] OR;  Service: Urology;  Laterality: N/A;  I&D SCROTAL ABSCESS    INSERTION OF STENT INTO PERIPHERAL VESSEL Right 10/17/2022    RIGHT SFA ATHERECTOMY, BALLOON ANGIOPLASTY, STENT; RIGHT ANTERIOR TIBIAL ARTERY ATHERECTOMY, BALLOON ANGIOPLASTY    ORCHIECTOMY Left 03/18/2024    Procedure: ORCHIECTOMY;  Surgeon: Fahad Rivas MD;  Location: [facility];  Service: Urology;  Laterality: Left;     Allergies:   Review of patient's allergies indicates:  No Known Allergies  Family History:     Family History   Problem Relation Name Age of Onset    Cancer Mother      Hypertension Mother      Heart failure Father      Stroke Father      Hypertension Father      No Known Problems Sister       Social History:   Social History[1]  Review of Systems:     The patient denies headaches, changes in vision, nausea, emesis, fevers, chills, chest pain, palpitations, abdominal pain, or changes in urinary or bowel habits.     Medications:     Home Medications:  Prior to Admission medications    Medication Sig Start Date End Date Taking? Authorizing Provider   aspirin (ECOTRIN) 81 MG EC tablet Take 1 tablet by mouth once daily. 3/14/25  Yes Provider, Historical   olopatadine (PATANOL) 0.1 % ophthalmic solution Apply to eye. 2/7/25  Yes Provider, Historical   sacubitriL-valsartan (ENTRESTO) 49-51 mg per tablet Take 1 tablet by mouth once daily. 2/12/25  Yes Provider, Historical   urea (CARMOL) 40 %  Crea Apply topically. 2/13/25  Yes Provider, Historical   varenicline tartrate (CHANTIX) 1 mg Tab Take 0.5 mg by mouth. 2/13/25  Yes Provider, Historical   acetaminophen 325 mg Cap Take 650 mg by mouth 2 (two) times daily as needed.    Provider, Historical   ALBUTEROL INHL Inhale 2 puffs into the lungs every 6 (six) hours as needed.    Provider, Historical   albuterol-ipratropium (DUO-NEB) 2.5 mg-0.5 mg/3 mL nebulizer solution Take 3 mLs by nebulization every 6 (six) hours as needed for Wheezing. Rescue 12/9/24   Diana Hassan MD   atorvastatin (LIPITOR) 80 MG tablet Take 1 tablet (80 mg total) by mouth once daily. 3/4/25 3/4/26  Marino Marquez DO   BREZTRI AEROSPHERE 160-9-4.8 mcg/actuation HFAA Inhale 2 puffs into the lungs 2 (two) times daily. 1/7/25   Provider, Historical   cetirizine (ZYRTEC) 10 MG tablet Take 1 tablet (10 mg total) by mouth once daily. 8/8/24 8/3/25  Abiodun Recinos DO   clopidogreL (PLAVIX) 75 mg tablet Take 1 tablet (75 mg total) by mouth once daily. 3/4/25   Marino Marquez DO   folic acid (FOLVITE) 1 MG tablet Take 1 tablet (1 mg total) by mouth once daily. 4/9/24 4/9/25  Tha Edmond MD   furosemide (LASIX) 40 MG tablet Take 1 tablet (40 mg total) by mouth 2 (two) times a day. 3/4/25   Marino Marquez DO   gabapentin (NEURONTIN) 300 MG capsule Take 300 mg by mouth 3 (three) times daily.    Provider, Historical   metoprolol succinate (TOPROL-XL) 25 MG 24 hr tablet Take 0.5 tablets (12.5 mg total) by mouth once daily. 3/4/25 3/4/26  Marino Marquez DO   nicotine (NICODERM CQ) 14 mg/24 hr Place 1 patch onto the skin daily as needed. 3/4/25   Marino Marquez DO   nicotine (NICODERM CQ) 21 mg/24 hr Place 1 patch onto the skin once daily. 3/20/25   Heaven Page MD   nitrofurantoin, macrocrystal-monohydrate, (MACROBID) 100 MG capsule Take 1 capsule (100 mg total) by mouth 2 (two) times daily. 3/4/25   Marino Marquez DO   pantoprazole (PROTONIX) 40 MG tablet Take 1 tablet (40 mg total) by  mouth 2 (two) times a day. 9/23/24   Abiodun Recinos DO   potassium chloride SA (K-DUR,KLOR-CON) 20 MEQ tablet Take 1 tablet (20 mEq total) by mouth 2 (two) times daily. 1/28/25 1/28/26  Vernon Cifuentes MD   rivaroxaban (XARELTO) 20 mg Tab Take 1 tablet (20 mg total) by mouth Daily. 3/4/25   Marino Marquez DO   sertraline (ZOLOFT) 100 MG tablet Take 100 mg by mouth once daily.    Provider, Historical   vitamin D (VITAMIN D3) 1000 units Tab Take 1,000 Units by mouth once daily.    Provider, Historical       Current/Inpatient Medications:  Infusions:   0.9% NaCl   Intravenous Continuous   Stopped at 05/04/25 2254    DOBUTamine IV infusion (non-titrating)  2.5 mcg/kg/min Intravenous Continuous 3.4 mL/hr at 05/09/25 0633 2.5 mcg/kg/min at 05/09/25 0633     Scheduled:   atorvastatin  80 mg Oral Daily    clopidogreL  75 mg Oral Daily    folic acid  1 mg Oral Daily    hydrocortisone sodium succinate  50 mg Intravenous Q12H    ipratropium  0.5 mg Nebulization Q12H    levalbuterol  1.25 mg Nebulization Q12H    miconazole NITRATE 2 %   Topical (Top) BID    pantoprazole  40 mg Oral BID    QUEtiapine  100 mg Oral QHS    rivaroxaban  20 mg Oral Daily    spironolactone  25 mg Oral Daily    vitamin D  1,000 Units Oral Daily     PRN:    Current Facility-Administered Medications:     acetaminophen, 650 mg, Oral, Q4H PRN    aluminum-magnesium hydroxide, 30 mL, Oral, BID PRN    dextrose 50%, 12.5 g, Intravenous, PRN    dextrose 50%, 25 g, Intravenous, PRN    glucagon (human recombinant), 1 mg, Intramuscular, PRN    glucose, 16 g, Oral, PRN    glucose, 24 g, Oral, PRN    hydrALAZINE, 10 mg, Intravenous, Q4H PRN    hydrocortisone, , Topical (Top), TID PRN    insulin aspart U-100, 0-5 Units, Subcutaneous, QID (AC + HS) PRN    lorazepam, 1 mg, Intravenous, Q6H PRN    melatonin, 6 mg, Oral, Nightly PRN    naloxone, 0.02 mg, Intravenous, PRN    nicotine, 1 patch, Transdermal, Daily PRN    polyethylene glycol, 17 g, Oral, Daily PRN     prochlorperazine, 5 mg, Intravenous, Q6H PRN    senna-docusate, 1 tablet, Oral, BID PRN    simethicone, 1 tablet, Oral, TID PRN    sodium chloride 0.9%, 10 mL, Intravenous, PRN    traZODone, 25 mg, Oral, Nightly PRN    Objective:     24-hour Vitals:   Temp:  [98 °F (36.7 °C)-99 °F (37.2 °C)] 98 °F (36.7 °C)  Pulse:  [] 88  Resp:  [12-34] 22  SpO2:  [83 %-100 %] 95 %  BP: ()/(60-87) 107/74    Vitals: /74   Pulse 88   Temp 98 °F (36.7 °C)   Resp (!) 22   Ht 6' (1.829 m)   Wt 88.6 kg (195 lb 5.2 oz)   SpO2 95%   BMI 26.49 kg/m²     Intake/Output Summary (Last 24 hours) at 5/9/2025 0727  Last data filed at 5/9/2025 0633  Gross per 24 hour   Intake 1772.29 ml   Output 4800 ml   Net -3027.71 ml       Physical Exam  Vitals reviewed.   Constitutional:       General: He is not in acute distress.     Appearance: Normal appearance. He is normal weight. He is not ill-appearing.   HENT:      Head: Normocephalic and atraumatic.      Right Ear: External ear normal.      Left Ear: External ear normal.      Nose: Nose normal.   Eyes:      Conjunctiva/sclera: Conjunctivae normal.   Cardiovascular:      Rate and Rhythm: Normal rate. Rhythm irregular.      Pulses: Normal pulses.      Heart sounds: Murmur heard.   Pulmonary:      Effort: Pulmonary effort is normal. No respiratory distress.      Breath sounds: No stridor. Rales present. No wheezing or rhonchi.      Comments: 3L NC. Basilar rales.   Chest:      Chest wall: No tenderness.   Abdominal:      General: Bowel sounds are normal. There is no distension.      Palpations: Abdomen is soft.   Musculoskeletal:      Cervical back: Neck supple.      Right lower leg: Edema present.      Left lower leg: Edema present.      Comments: Trace LE edema.    Skin:     General: Skin is warm and dry.      Capillary Refill: Capillary refill takes less than 2 seconds.   Neurological:      Mental Status: He is alert and oriented to person, place, and time.   Psychiatric:          Mood and Affect: Mood normal.         Behavior: Behavior normal.        Labs:   I have reviewed the following labs below:    Recent Labs   Lab 05/03/25  0542 05/03/25  1230 05/04/25  0215 05/04/25  1409 05/05/25  0206 05/05/25  0841 05/06/25  1823 05/07/25  0013 05/07/25  0426 05/07/25  0813 05/08/25  0439 05/08/25  0759 05/08/25  1233 05/08/25  1950 05/09/25  0006 05/09/25  0407   WBC 7.41  --  8.47  --  11.14   < >  --   --  8.54  --  9.53  --   --   --   --  9.42   HGB 8.2*  --  7.7*  --  7.8*   < >  --   --  7.7*  --  7.6*  --   --   --   --  7.7*   HCT 25.8*  --  23.6*  --  24.2*   < >  --   --  22.9*  --  23.2*  --   --   --   --  23.0*     --  147  --  148   < >  --   --  149  --  147  --   --   --   --  160   *   < > 131*   < > 131*   < > 135* 135* 135*   < > 131* 132*   < > 133* 131* 131*   K 4.3   < > 4.0   < > 4.3   < > 3.3* 3.5 2.7*   < > 3.2* 2.7*   < > 3.0* 3.6 3.5   CL 95*   < > 93*   < > 92*   < > 87* 86* 83*   < > 79* 78*   < > 78* 78* 79*   CO2 16*   < > 21*   < > 23   < > 30 30 35*   < > 36* 38*   < > 38* 37* 35*   BUN 36.3*   < > 46.5*   < > 51.9*   < > 55.0* 56.3* 55.4*   < > 59.9* 57.5*   < > 61.8* 61.7* 61.5*   CREATININE 1.91*   < > 1.97*   < > 1.84*   < > 1.65* 1.71* 1.64*   < > 1.60* 1.50*   < > 1.61* 1.56* 1.56*      < > 210*   < > 165*   < > 144* 158* 162*   < > 149* 142*   < > 158* 161* 162*   CALCIUM 9.9   < > 9.4   < > 9.1   < > 9.4 9.4 9.3   < > 9.5 9.3   < > 9.6 9.4 9.4   MG 2.10  --  2.40  --  2.30   < > 2.20 2.20 2.10   < > 2.10 2.00   < > 2.20 2.20 2.10   PHOS 4.0  --  3.6  --  3.4   < > 2.5 2.6 3.3   < > 3.8 3.8   < > 3.1 3.0 3.1   PROT 8.0*  --  8.0*  --  8.0*  --   --   --   --   --   --   --   --   --   --   --    ALBUMIN 3.4  --  3.4  --  3.4  --   --   --   --   --   --   --   --   --   --   --    BILITOT 5.4*  --  5.0*  --  4.7*  --   --   --   --   --   --   --   --   --   --   --    AST 36  --  45  --  62*  --   --   --   --   --   --   --   --    --   --   --    ALT 18  --  20  --  25  --   --   --   --   --   --   --   --   --   --   --    ALKPHOS 213*  --  231*  --  220*  --   --   --   --   --   --   --   --   --   --   --    TROPONINI  --   --   --    < >  --    < > 0.106* 0.120* 0.095*  --   --   --   --   --   --   --    BNP  --   --   --   --   --   --   --   --   --   --   --  3,765.7*  --   --   --   --     < > = values in this interval not displayed.     Cardiovascular Studies/Imaging:   I have reviewed the following studies below:  TTE:   Summary  Show Result Comparison     Left Ventricle: The left ventricle is severely dilated. Severely increased ventricular mass. Mildly increased wall thickness. There is severe eccentric hypertrophy. Severe global hypokinesis present. There is severely reduced systolic function. Quantitated ejection fraction is 22%. Grade III diastolic dysfunction.    Right Ventricle: The right ventricle has moderate enlargement. Systolic function is moderately reduced.    Left Atrium: Left atrium is severely dilated measuring 92ml/m2.    Right Atrium: Right atrium is severely dilated.    Aortic Valve: The aortic valve is a trileaflet valve. There is mild aortic valve sclerosis. There is no stenosis. There is no significant regurgitation.    Mitral Valve: The mitral valve is structurally normal. There is no stenosis. There is severe central regurgitation.    Tricuspid Valve: The tricuspid valve is structurally normal. There is moderate regurgitation.    Pulmonary Artery: There is moderate pulmonary hypertension. The estimated pulmonary artery systolic pressure is 59 mmHg.    IVC/SVC: Elevated venous pressure at 15 mmHg.    Pericardium: There is no pericardial effusion.    LHC/Cors: 3/26/2024  Coronary findings:     Dominance: right   Left main:  10-20% calcified distal left main disease  Left anterior descending artery:  50% calcified proximal stenosis  Circumflex artery:  Luminal irregularities  Right coronary artery:  Luminal  irregularities    Imaging:   See imaging section for details.     Assessment:     Ran Reyes Jr. is a 69 y.o. male with NICM-HFrEF (LVEF 20-25%; G3DD), HTN, non obstructive CAD, AF on OAC, PAD, HLD, and COPD who presented with acute on chronic decompensated heart failure in cardiogenic shock now resolved.     Plan/Recommendations:   Acute on Chronic NICM-HFrEF with CS and Severe Mitral Regurgitation   -Continue fixed dose Dobutamine 5 mcg/kg/min.   -Hold diuretic therapy today. Suspect metabolic alkalosis is in some part contributory to tissue hypoxia which can be driving/part of lactic acidosis - though CS remains likely primary  of LA production.   -Entresto 24-26 mg BID.   -Continue to hold BB (on inotropic support). No SGLT2i (has history of Claudine's).    -Reassess LA 2 hours after decreasing dose of Dobutamine.   -Close monitoring of electrolytes. Replace as clinically warranted.     Atrial Fibrillation  -Hold BB for now given above.   -Continue anticoagulation for CVA risk reduction.     Hypertension  -ARNi as above.      Non Obstructive Epicardial Coronary Artery Disease  Peripheral Artery Disease  -SAPT and high intensity statin therapy.     Vernon Cifuentes MD  [facility] Chief Cardiology Fellow, PGY-6  [facility]                [1]   Social History  Tobacco Use    Smoking status: Every Day     Current packs/day: 0.50     Average packs/day: 0.8 packs/day for 50.4 years (40.9 ttl pk-yrs)     Types: Cigarettes     Start date: 1975     Passive exposure: Current    Smokeless tobacco: Never    Tobacco comments:     States smoke 2-3 cigarettes per day   Substance Use Topics    Alcohol use: Yes     Alcohol/week: 3.0 standard drinks of alcohol     Types: 3 Cans of beer per week     Comment: socially    Drug use: Not Currently     Frequency: 1.0 times per week     Types: Marijuana     Comment: no use in over 1 year

## 2025-05-09 NOTE — PT/OT/SLP RE-EVAL
Occupational Therapy   Re-evaluation    Name: Ran Reyes Jr.  MRN: [id number]  Admitting Diagnosis:  Cardiogenic Shock, Lactic Acidosis , HFrEF (22%, G3DD), Pulmonary Hypertension, NOCAD, NICMO, NSTEMI likely demand, Chronic persistent Afib, ELIZABETH  Hx of VT arrest (has life vest)  Recent Surgery: * No surgery found *      Recommendations:     Discharge Recommendations: Moderate Intensity Therapy  Discharge Equipment Recommendations: bath bench  Barriers to discharge:  Decreased caregiver support    Assessment:     Ran Reyes Jr. is a 69 y.o. male with a medical diagnosis of Cardiogenic Shock, Lactic Acidosis , HFrEF (22%, G3DD), Pulmonary Hypertension, NOCAD, NICMO, NSTEMI likely demand, Chronic persistent Afib, ELIZABETH.  Reevaluation due to pt upgraded to ICU 2/2 worsening lactate and agonal breathing. He presents with declined from initial evaluation, now recommending mod intensity upon dc due to pt resides at home alone.  Performance deficits affecting function are weakness, impaired endurance, impaired self care skills, impaired functional mobility, gait instability, impaired balance.      Rehab Prognosis:  good; patient would benefit from acute skilled OT services to address these deficits and reach maximum level of function.       Plan:     Patient to be seen 3 x/week to address the above listed problems via self-care/home management, therapeutic activities, therapeutic exercises, neuromuscular re-education  Plan of Care Expires: 04/28/25  Plan of Care Reviewed with: patient    Subjective     Chief Complaint: pt c/o fatigue   Patient/Family stated goals: return home PLOF  Communicated with: nurse prior to session.  Pain/Comfort:  Pain Rating 1: 0/10    Objective:     Communicated with: nurse prior to session.  Patient found sitting edge of bed with PT with: blood pressure cuff, telemetry, pulse ox (continuous), oxygen, peripheral IV upon OT entry to room.    General Precautions: Standard,  contact  Orthopedic Precautions: N/A  Braces: N/A  Respiratory Status: Nasal cannula, flow 2 L/min    Occupational Performance:    Bed Mobility:    NT pt sitting EOB with PT services upon OT arrival     Functional Mobility/Transfers:  Patient completed Sit <> Stand Transfer with minimum assistance  with  rollator   Patient completed Toilet Transfer Step Transfer technique with minimum assistance with  rollator, [facility]  Functional Mobility: pt ambulated in room yanira 8 ft with rollator CGA  After static standing x yanira 3 minutes pt c/o back pain and SOB and requested to return to sitting    Activities of Daily Living:  Upper Body Dressing: stand by assistance [facility] gown overhead  Lower Body Dressing: doffed/donned socks seated on BS SBA, SOB and restbreak req'd     Toileting: booth catheter, max A BM with staff members      Cognitive/Visual Perceptual:  Cognitive/Psychosocial Skills:     -       Oriented to: Person, Place, and Time   -       Follows Commands/attention:Follows multistep  commands  -       Mood/Affect/Coping skills/emotional control: Cooperative    Physical Exam:  Balance: -       static sitting balance mod I, dynamic sitting balance SBA, static standing balance SBA, dynamic standing balance min A  Upper Extremity Range of Motion:     -       Right Upper Extremity: WNL  -       Left Upper Extremity: WNL  Upper Extremity Strength:    -       Right Upper Extremity: WNL  -       Left Upper Extremity: WNL    AMPAC 6 Click:  [facility] Total Score:      Treatment & Education:  Pt. educated on OT goals, POC, orientation to environment, use of call bell for assist with transfers OOB or for any other needs due to fall risk.    Patient left sitting edge of bed with all lines intact, call button in reach, and nurse notified    GOALS:   Multidisciplinary Problems       Occupational Therapy Goals          Problem: Occupational Therapy    Goal Priority Disciplines Outcome Interventions   Occupational Therapy  Goal     OT, PT/OT Progressing    Description: Pt will ambulate to bathroom with rollator SBA  Pt will complete toilet t/f SBA  Pt will complete toileting tasks SBA  Pt will complete UE/LE dressing mod I  Pt will increase standing endurance x 5 minutes.                        DME Justifications:  No DME recommended requiring DME justifications    History:     Past Medical History:   Diagnosis Date    A-fib     Anticoagulant long-term use     Anxiety     Aortic aneurysm     Cataract     CHF (congestive heart failure)     Chronic atrial fibrillation     COPD (chronic obstructive pulmonary disease)     Coronary artery disease     Depression     HLD (hyperlipidemia)     Hypertension     Mitral regurgitation     PAD (peripheral artery disease)     Primary open angle glaucoma (POAG)          Past Surgical History:   Procedure Laterality Date    ANGIOGRAM, CORONARY, WITH LEFT HEART CATHETERIZATION N/A 03/26/2024    Procedure: Angiogram, Coronary, with Left Heart Cath;  Surgeon: Adriano Montiel MD;  Location: [facility] CATH LAB;  Service: Cardiology;  Laterality: N/A;    ATHERECTOMY OF PERIPHERAL VESSEL Left 09/12/2022    LEFT SFA ATHERECTOMY, BALLOON ANGIOPLASTY    CATARACT EXTRACTION W/  INTRAOCULAR LENS IMPLANT Left 03/09/2023    Procedure: EXTRACTION, CATARACT, WITH IOL INSERTION;  Surgeon: Georgi Borja MD;  Location: [facility];  Service: Ophthalmology;  Laterality: Left;  19.5  mac    EGD, WITH CLOSED BIOPSY  03/22/2024    Procedure: EGD, WITH CLOSED BIOPSY;  Surgeon: Ronald Calderon MD;  Location: [facility] ENDOSCOPY;  Service: Gastroenterology;;    ESOPHAGOGASTRODUODENOSCOPY N/A 03/22/2024    Procedure: EGD;  Surgeon: Ronald Calderon MD;  Location: [facility] ENDOSCOPY;  Service: Gastroenterology;  Laterality: N/A;    Heart Stent N/A     > 10yrs. AGO    INCISION AND DRAINAGE N/A 03/18/2024    Procedure: Incision and Drainage;  Surgeon: Fahad Rivas MD;  Location: [facility];  Service: Urology;   Laterality: N/A;  I&D SCROTAL ABSCESS    INSERTION OF STENT INTO PERIPHERAL VESSEL Right 10/17/2022    RIGHT SFA ATHERECTOMY, BALLOON ANGIOPLASTY, STENT; RIGHT ANTERIOR TIBIAL ARTERY ATHERECTOMY, BALLOON ANGIOPLASTY    ORCHIECTOMY Left 03/18/2024    Procedure: ORCHIECTOMY;  Surgeon: Fahad Rivas MD;  Location: [facility];  Service: Urology;  Laterality: Left;       Time Tracking:     OT Date of Treatment: 05/09/25  OT Start Time: 1129  OT Stop Time: 1156  OT Total Time (min): 27 min    Billable Minutes:Re-eval 27    5/9/2025

## 2025-05-09 NOTE — PROGRESS NOTES
Met with sister, Yesenia Eric, & niece, Tiffanie Sweeney, who expressed preference for [facility] or [facility] for placement if recommended. Awaiting new orders for PT/OT following upgrade to ICU. Pt currently accepted by [facility] for  services.

## 2025-05-10 LAB
ANION GAP SERPL CALC-SCNC: 14 MEQ/L
ANION GAP SERPL CALC-SCNC: 15 MEQ/L
ANION GAP SERPL CALC-SCNC: 15 MEQ/L
BASOPHILS # BLD AUTO: 0 X10(3)/MCL
BASOPHILS NFR BLD AUTO: 0 %
BUN SERPL-MCNC: 52.2 MG/DL (ref 8.4–25.7)
BUN SERPL-MCNC: 53.8 MG/DL (ref 8.4–25.7)
BUN SERPL-MCNC: 54 MG/DL (ref 8.4–25.7)
BUN SERPL-MCNC: 54.1 MG/DL (ref 8.4–25.7)
BUN SERPL-MCNC: 55.5 MG/DL (ref 8.4–25.7)
BUN SERPL-MCNC: 59.4 MG/DL (ref 8.4–25.7)
CALCIUM SERPL-MCNC: 10 MG/DL (ref 8.8–10)
CALCIUM SERPL-MCNC: 10.2 MG/DL (ref 8.8–10)
CALCIUM SERPL-MCNC: 10.2 MG/DL (ref 8.8–10)
CALCIUM SERPL-MCNC: 9.7 MG/DL (ref 8.8–10)
CALCIUM SERPL-MCNC: 9.7 MG/DL (ref 8.8–10)
CALCIUM SERPL-MCNC: 9.8 MG/DL (ref 8.8–10)
CHLORIDE SERPL-SCNC: 82 MMOL/L (ref 98–107)
CHLORIDE SERPL-SCNC: 82 MMOL/L (ref 98–107)
CHLORIDE SERPL-SCNC: 85 MMOL/L (ref 98–107)
CHLORIDE SERPL-SCNC: 86 MMOL/L (ref 98–107)
CHLORIDE SERPL-SCNC: 86 MMOL/L (ref 98–107)
CHLORIDE SERPL-SCNC: 87 MMOL/L (ref 98–107)
CO2 SERPL-SCNC: 31 MMOL/L (ref 23–31)
CO2 SERPL-SCNC: 32 MMOL/L (ref 23–31)
CO2 SERPL-SCNC: 33 MMOL/L (ref 23–31)
CREAT SERPL-MCNC: 1.19 MG/DL (ref 0.72–1.25)
CREAT SERPL-MCNC: 1.2 MG/DL (ref 0.72–1.25)
CREAT SERPL-MCNC: 1.24 MG/DL (ref 0.72–1.25)
CREAT SERPL-MCNC: 1.27 MG/DL (ref 0.72–1.25)
CREAT SERPL-MCNC: 1.27 MG/DL (ref 0.72–1.25)
CREAT SERPL-MCNC: 1.43 MG/DL (ref 0.72–1.25)
CREAT/UREA NIT SERPL: 42
CREAT/UREA NIT SERPL: 42
CREAT/UREA NIT SERPL: 43
CREAT/UREA NIT SERPL: 44
CREAT/UREA NIT SERPL: 45
CREAT/UREA NIT SERPL: 45
EOSINOPHIL # BLD AUTO: 0.01 X10(3)/MCL (ref 0–0.9)
EOSINOPHIL NFR BLD AUTO: 0.1 %
ERYTHROCYTE [DISTWIDTH] IN BLOOD BY AUTOMATED COUNT: 25.1 % (ref 11.5–17)
GFR SERPLBLD CREATININE-BSD FMLA CKD-EPI: 54 ML/MIN/1.73/M2
GFR SERPLBLD CREATININE-BSD FMLA CKD-EPI: >60 ML/MIN/1.73/M2
GLUCOSE SERPL-MCNC: 123 MG/DL (ref 82–115)
GLUCOSE SERPL-MCNC: 130 MG/DL (ref 82–115)
GLUCOSE SERPL-MCNC: 141 MG/DL (ref 82–115)
GLUCOSE SERPL-MCNC: 161 MG/DL (ref 82–115)
GLUCOSE SERPL-MCNC: 183 MG/DL (ref 82–115)
GLUCOSE SERPL-MCNC: 206 MG/DL (ref 82–115)
HCT VFR BLD AUTO: 22.6 % (ref 42–52)
HGB BLD-MCNC: 7.5 G/DL (ref 14–18)
IMM GRANULOCYTES # BLD AUTO: 0.07 X10(3)/MCL (ref 0–0.04)
IMM GRANULOCYTES NFR BLD AUTO: 0.8 %
LACTATE SERPL-SCNC: 1 MMOL/L (ref 0.5–2.2)
LYMPHOCYTES # BLD AUTO: 0.46 X10(3)/MCL (ref 0.6–4.6)
LYMPHOCYTES NFR BLD AUTO: 5 %
MAGNESIUM SERPL-MCNC: 2.1 MG/DL (ref 1.6–2.6)
MAGNESIUM SERPL-MCNC: 2.1 MG/DL (ref 1.6–2.6)
MAGNESIUM SERPL-MCNC: 2.2 MG/DL (ref 1.6–2.6)
MAGNESIUM SERPL-MCNC: 2.3 MG/DL (ref 1.6–2.6)
MCH RBC QN AUTO: 25 PG (ref 27–31)
MCHC RBC AUTO-ENTMCNC: 33.2 G/DL (ref 33–36)
MCV RBC AUTO: 75.3 FL (ref 80–94)
MONOCYTES # BLD AUTO: 0.77 X10(3)/MCL (ref 0.1–1.3)
MONOCYTES NFR BLD AUTO: 8.4 %
NEUTROPHILS # BLD AUTO: 7.87 X10(3)/MCL (ref 2.1–9.2)
NEUTROPHILS NFR BLD AUTO: 85.7 %
NRBC BLD AUTO-RTO: 0 %
PHOSPHATE SERPL-MCNC: 2.5 MG/DL (ref 2.3–4.7)
PHOSPHATE SERPL-MCNC: 2.5 MG/DL (ref 2.3–4.7)
PHOSPHATE SERPL-MCNC: 2.6 MG/DL (ref 2.3–4.7)
PHOSPHATE SERPL-MCNC: 3 MG/DL (ref 2.3–4.7)
PHOSPHATE SERPL-MCNC: 3.1 MG/DL (ref 2.3–4.7)
PHOSPHATE SERPL-MCNC: 3.2 MG/DL (ref 2.3–4.7)
PLATELET # BLD AUTO: 153 X10(3)/MCL (ref 130–400)
PLATELETS.RETICULATED NFR BLD AUTO: 4.5 % (ref 0.9–11.2)
PMV BLD AUTO: ABNORMAL FL
POCT GLUCOSE: 125 MG/DL (ref 70–110)
POCT GLUCOSE: 166 MG/DL (ref 70–110)
POCT GLUCOSE: 184 MG/DL (ref 70–110)
POTASSIUM SERPL-SCNC: 2.6 MMOL/L (ref 3.5–5.1)
POTASSIUM SERPL-SCNC: 3.1 MMOL/L (ref 3.5–5.1)
POTASSIUM SERPL-SCNC: 3.4 MMOL/L (ref 3.5–5.1)
RBC # BLD AUTO: 3 X10(6)/MCL (ref 4.7–6.1)
SODIUM SERPL-SCNC: 129 MMOL/L (ref 136–145)
SODIUM SERPL-SCNC: 130 MMOL/L (ref 136–145)
SODIUM SERPL-SCNC: 131 MMOL/L (ref 136–145)
SODIUM SERPL-SCNC: 132 MMOL/L (ref 136–145)
SODIUM SERPL-SCNC: 133 MMOL/L (ref 136–145)
SODIUM SERPL-SCNC: 134 MMOL/L (ref 136–145)
WBC # BLD AUTO: 9.18 X10(3)/MCL (ref 4.5–11.5)

## 2025-05-10 PROCEDURE — 25000003 PHARM REV CODE 250

## 2025-05-10 PROCEDURE — 80048 BASIC METABOLIC PNL TOTAL CA: CPT

## 2025-05-10 PROCEDURE — 83735 ASSAY OF MAGNESIUM: CPT

## 2025-05-10 PROCEDURE — 97110 THERAPEUTIC EXERCISES: CPT

## 2025-05-10 PROCEDURE — 94761 N-INVAS EAR/PLS OXIMETRY MLT: CPT

## 2025-05-10 PROCEDURE — 27000207 HC ISOLATION

## 2025-05-10 PROCEDURE — 84100 ASSAY OF PHOSPHORUS: CPT

## 2025-05-10 PROCEDURE — 85025 COMPLETE CBC W/AUTO DIFF WBC: CPT

## 2025-05-10 PROCEDURE — 63600175 PHARM REV CODE 636 W HCPCS

## 2025-05-10 PROCEDURE — 36415 COLL VENOUS BLD VENIPUNCTURE: CPT

## 2025-05-10 PROCEDURE — 20000000 HC ICU ROOM

## 2025-05-10 PROCEDURE — 25000003 PHARM REV CODE 250: Performed by: STUDENT IN AN ORGANIZED HEALTH CARE EDUCATION/TRAINING PROGRAM

## 2025-05-10 PROCEDURE — 27000221 HC OXYGEN, UP TO 24 HOURS

## 2025-05-10 PROCEDURE — 83605 ASSAY OF LACTIC ACID: CPT

## 2025-05-10 PROCEDURE — 94640 AIRWAY INHALATION TREATMENT: CPT

## 2025-05-10 PROCEDURE — 97116 GAIT TRAINING THERAPY: CPT

## 2025-05-10 PROCEDURE — 25000242 PHARM REV CODE 250 ALT 637 W/ HCPCS

## 2025-05-10 RX ORDER — BENZONATATE 100 MG/1
100 CAPSULE ORAL 3 TIMES DAILY PRN
Status: DISCONTINUED | OUTPATIENT
Start: 2025-05-10 | End: 2025-05-10

## 2025-05-10 RX ORDER — POTASSIUM CHLORIDE 20 MEQ/1
40 TABLET, EXTENDED RELEASE ORAL ONCE
Status: COMPLETED | OUTPATIENT
Start: 2025-05-10 | End: 2025-05-10

## 2025-05-10 RX ORDER — GUAIFENESIN 100 MG/5ML
200 LIQUID ORAL EVERY 4 HOURS PRN
Status: DISCONTINUED | OUTPATIENT
Start: 2025-05-11 | End: 2025-05-20 | Stop reason: HOSPADM

## 2025-05-10 RX ORDER — FENTANYL CITRATE 50 UG/ML
25 INJECTION, SOLUTION INTRAMUSCULAR; INTRAVENOUS ONCE
Refills: 0 | Status: DISCONTINUED | OUTPATIENT
Start: 2025-05-10 | End: 2025-05-10

## 2025-05-10 RX ORDER — POTASSIUM CHLORIDE 20 MEQ/1
40 TABLET, EXTENDED RELEASE ORAL ONCE
Status: DISCONTINUED | OUTPATIENT
Start: 2025-05-11 | End: 2025-05-10

## 2025-05-10 RX ORDER — DEXMEDETOMIDINE HYDROCHLORIDE 4 UG/ML
0-1.4 INJECTION, SOLUTION INTRAVENOUS CONTINUOUS
Status: DISCONTINUED | OUTPATIENT
Start: 2025-05-10 | End: 2025-05-10

## 2025-05-10 RX ORDER — POTASSIUM CHLORIDE 14.9 MG/ML
20 INJECTION INTRAVENOUS
Status: COMPLETED | OUTPATIENT
Start: 2025-05-10 | End: 2025-05-10

## 2025-05-10 RX ADMIN — RIVAROXABAN 20 MG: 10 TABLET, FILM COATED ORAL at 05:05

## 2025-05-10 RX ADMIN — HYDROCORTISONE SODIUM SUCCINATE 50 MG: 100 INJECTION, POWDER, FOR SOLUTION INTRAMUSCULAR; INTRAVENOUS at 03:05

## 2025-05-10 RX ADMIN — MICONAZOLE NITRATE: 20 POWDER TOPICAL at 09:05

## 2025-05-10 RX ADMIN — POTASSIUM CHLORIDE 20 MEQ: 14.9 INJECTION, SOLUTION INTRAVENOUS at 05:05

## 2025-05-10 RX ADMIN — SPIRONOLACTONE 25 MG: 25 TABLET, FILM COATED ORAL at 09:05

## 2025-05-10 RX ADMIN — IPRATROPIUM BROMIDE 0.5 MG: 0.5 SOLUTION RESPIRATORY (INHALATION) at 07:05

## 2025-05-10 RX ADMIN — LEVALBUTEROL HYDROCHLORIDE 1.25 MG: 1.25 SOLUTION RESPIRATORY (INHALATION) at 07:05

## 2025-05-10 RX ADMIN — POTASSIUM CHLORIDE 40 MEQ: 1500 TABLET, EXTENDED RELEASE ORAL at 08:05

## 2025-05-10 RX ADMIN — HYDROCORTISONE: 10 CREAM TOPICAL at 05:05

## 2025-05-10 RX ADMIN — POTASSIUM CHLORIDE 20 MEQ: 14.9 INJECTION, SOLUTION INTRAVENOUS at 08:05

## 2025-05-10 RX ADMIN — PANTOPRAZOLE SODIUM 40 MG: 40 TABLET, DELAYED RELEASE ORAL at 08:05

## 2025-05-10 RX ADMIN — FOLIC ACID 1 MG: 1 TABLET ORAL at 09:05

## 2025-05-10 RX ADMIN — HYDROCORTISONE SODIUM SUCCINATE 50 MG: 100 INJECTION, POWDER, FOR SOLUTION INTRAMUSCULAR; INTRAVENOUS at 05:05

## 2025-05-10 RX ADMIN — CLOPIDOGREL BISULFATE 75 MG: 75 TABLET, FILM COATED ORAL at 09:05

## 2025-05-10 RX ADMIN — POTASSIUM PHOSPHATE, MONOBASIC POTASSIUM PHOSPHATE, DIBASIC 15 MMOL: 224; 236 INJECTION, SOLUTION, CONCENTRATE INTRAVENOUS at 09:05

## 2025-05-10 RX ADMIN — PANTOPRAZOLE SODIUM 40 MG: 40 TABLET, DELAYED RELEASE ORAL at 09:05

## 2025-05-10 RX ADMIN — LEVALBUTEROL HYDROCHLORIDE 1.25 MG: 1.25 SOLUTION RESPIRATORY (INHALATION) at 08:05

## 2025-05-10 RX ADMIN — POTASSIUM CHLORIDE 20 MEQ: 14.9 INJECTION, SOLUTION INTRAVENOUS at 07:05

## 2025-05-10 RX ADMIN — IPRATROPIUM BROMIDE 0.5 MG: 0.5 SOLUTION RESPIRATORY (INHALATION) at 08:05

## 2025-05-10 RX ADMIN — HYDROCORTISONE SODIUM SUCCINATE 50 MG: 100 INJECTION, POWDER, FOR SOLUTION INTRAMUSCULAR; INTRAVENOUS at 09:05

## 2025-05-10 RX ADMIN — ATORVASTATIN CALCIUM 80 MG: 40 TABLET, FILM COATED ORAL at 09:05

## 2025-05-10 RX ADMIN — Medication 1000 UNITS: at 09:05

## 2025-05-10 RX ADMIN — POTASSIUM CHLORIDE 20 MEQ: 14.9 INJECTION, SOLUTION INTRAVENOUS at 09:05

## 2025-05-10 RX ADMIN — QUETIAPINE FUMARATE 100 MG: 100 TABLET ORAL at 08:05

## 2025-05-10 RX ADMIN — DOBUTAMINE HYDROCHLORIDE 2.5 MCG/KG/MIN: 400 INJECTION INTRAVENOUS at 05:05

## 2025-05-10 NOTE — PT/OT/SLP PROGRESS
Physical Therapy Treatment    Patient Name:  Ran Reyes Jr.   MRN:  [id number]    Recommendations     Therapy Intensity Recommendations at Discharge: Moderate Intensity Therapy  Discharge Equipment Recommendations: bath bench   Barriers to discharge: fall risk, severity of deficits, level of skilled assistance required, decreased endurance, and medical diagnosis    Assessment     Ran Reyes Jr. is a 69 y.o. male admitted with a medical diagnosis of:  1. ELIZABETH (acute kidney injury)    2. Weakness    3. Dyspnea    4. Screening due    5. HFrEF (heart failure with reduced ejection fraction)    6. Acidemia    7. Microcytic anemia    8. Tobacco dependency    9. Acute on chronic combined systolic and diastolic congestive heart failure    10. Bradycardia    11. QT prolongation    12. Heart failure with reduced ejection fraction    13. Heart failure    14. At risk for long QT syndrome    15. Chest pain    16. Acute combined systolic and diastolic congestive heart failure    17. Nonrheumatic mitral valve regurgitation       Problem List[1]   He presents with the following impairments/functional limitations:  weakness, impaired endurance, impaired self care skills, impaired functional mobility, gait instability, impaired balance, impaired cardiopulmonary response to activity.    Rehab Prognosis: Good.    Patient would benefit from continued skilled acute PT services to: address above listed impairments/functional limitations; receive patient/caregiver education; reduce fall risk; and maximize independency/safety with functional mobility.    Recent Surgery: * No surgery found *      Plan     During this hospitalization, patient to be seen 5 x/week to address the identified impairments/functional limitations via gait training, therapeutic activities, therapeutic exercises and progress toward the established goals.    Plan of Care Expires:   (Discharge)    Subjective     Communicated with patient's nurse Vincent prior to  session.    Patient agreeable to participate in treatment session.    Chief Complaint: Weakness  Patient/Family Comments/goals: Get better to go home  Pain/Comfort:  Pain Rating 1: 0/10  Pain Addressed 1: Nurse notified  Pain Rating Post-Intervention 1: 0/10    Objective     Patient found supine in bed and with HOB elevated with telemetry, oxygen, pulse ox (continuous), blood pressure cuff, peripheral IV, booth catheter (Life Vest)  upon PT entry to room.    General Precautions: Standard, fall, contact   Orthopedic Precautions:N/A   Braces:  N/A  Respiratory Status: 1 liters/min O2 via nasal cannula    Functional Mobility:    Bed Mobility:  Rolling Left: stand by assistance  Supine to Sit: stand by assistance  Sit to Supine: stand by assistance  with cues for proper technique    Transfers:  Sit to Stand: stand by assistance with rollator  Stand to Sit: stand by assistance with rollator  with cues for hand placement, foot placement, and posture    Gait:  Patient ambulated 130ft with rollator and stand by assistance.  Patient demonstrates :       no loss of balance       no mis-steps       decreased susie       decreased bilateral step length       flexed posture      slowed, guarded, time consuming movements - all transitional activities       Standing rest break x2 due to R ankle discomfort.    Other Mobility:  Therapeutic Exercises performed:        -seated exercises:              marches            hip adduction            hip abduction            long arc quads            ankle pumps            2 sets of 10 each    Patient left supine in bed and with HOB elevated with all lines intact, call button in reach, tray table at bedside, bedside commode at bedside, and patient's nurse notified.    DME Justifications:  No DME recommended requiring DME justifications    Education     Patient educated on and assisted with functional mobility as noted above.  Patient educated on PT Plan of Care.  Patient was instructed to  utilize staff assistance for mobility/transfers.  White board updated regarding patient's safest level of mobility with staff assistance    Goals     Multidisciplinary Problems       Physical Therapy Goals          Problem: Physical Therapy    Goal Priority Disciplines Outcome Interventions   Physical Therapy Goal     PT, PT/OT Progressing    Description: REVIEWED 05/10/2025  Goals to be met by: DISCHARGE  Patient will increase functional independence with mobility by performin. Sit <=> stand w/ RW at Elizabeth. - ONGOING  2. Bed <=> chair w/ RW at Elizabeth. - ONGOING  3. Ambulate 130' w/ RW at Elizabeth. - ONGOING                     Time Tracking     PT Received On: 05/10/25  PT Start Time: 1020     PT Stop Time: 1048  PT Total Time (min): 28 min     Billable Minutes: Gait Training 15 and Therapeutic Exercise 13    05/10/2025       [1]   Patient Active Problem List  Diagnosis    Primary open angle glaucoma (POAG) of right eye, moderate stage    Combined forms of age-related cataract of left eye    Arteriosclerosis of coronary artery    Chronic atrial fibrillation    Dyslipidemia    Hypertension    Tobacco use    PVD (peripheral vascular disease)    VHD (valvular heart disease)    COVID-19    HFrEF (heart failure with reduced ejection fraction)    Nonrheumatic mitral valve regurgitation    Postoperative eye state    Scrotal hematoma    Chronic obstructive pulmonary disease, unspecified    Lesion of external ear    Low back pain    Other thrombophilia    Secondary hyperaldosteronism    Positive colorectal cancer screening using Cologuard test    Acute heart failure    History of COPD    CHF (congestive heart failure)    Acute cystitis with hematuria    Tobacco dependency    Moderate malnutrition

## 2025-05-10 NOTE — PROGRESS NOTES
[facility] - [address] [place] [address] Telemetry  Pulmonary Critical Care Note    Patient Name: Ran Reyes Jr.  MRN: [id number]  Admission Date: 4/21/2025  Hospital Length of Stay: 18 days  Code Status: Full Code  Attending Provider: Trisha German MD  Primary Care Provider: Abiodun Recinos DO     Subjective:     HPI:   Ran Reyes Jr. is a 69 y.o. 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 Patient reports he can only ambulate about 20-30 feet before having to stop due to claudication pain which is chronic and unchanged compared to baseline. Since running out of his diuretic medications approximately 5 days ago he has noted progressively worsening lower extremity swelling causing leg heaviness and weakness exacerbating difficulty ambulating.  He also notes acute on chronic cough productive of a brownish sputum without hemoptysis.  Denies fever, chills, sweats.  Denies chest pain.  Endorses shortness of breath at rest and with ambulation but unchanged compared to baseline.  Denies abdominal pain, nausea, vomiting, constipation, diarrhea.  Denies increased or decreased urinary frequency, dysuria, or hematuria.  Sleeps with 2 pillows at night due to baseline orthopnea. Has not had to increase number of pillows or change sleeping habits. Wears 2L NC home oxygen at baseline for hx of COPD. Denies having increased oxygen requirements prior to ED presentation.     ED course:  Vital signs stable.  Oxygenation  stable on 2 L nasal cannula.  CBC shows microcytic anemic (HGB 9.0; MCV 77).  CMP shows hyponatremia (Na 128), acidemia (CO2 14), elevated renal indices (BUN 25; creatinine 1.93), elevated alkaline phosphatase (). Troponin WNL.  BNP 26 39.5.  UDS negative.  EKG showed atrial fibrillation with PVCs.  Chest x-ray difficult to interpret due to overlying medical devices however appears to show cardiomegaly with bilateral pulmonary vascular congestion with question of bilateral pleural effusions. He was admitted for acute CHF exacerbation and cardiorenal syndrome.        Hospital Course/Significant events:  4/23/25: Admit to ICU, started on pressors and inotropes for cardiogenic / septic shock   5/2/25: Downgraded to    5/3/25: Re-upgraded to ICU 2/2 worsening lactate and agonal breathing     24 Hour Interval History:  NAEON. BP stable and SpO2 remains > 94% on room air. No agitation overnight. Patient remained on Dobutamine 2.5 mcg/min overnight and morning lactic acid normal at 1. Remains off Bumex and Metolazone. Still on Aldactone daily. UOP noted at 2.7 L. Patient doing well and has no acute complaints. CBC stable. BMP with noted hyponatremia at 129, hypokalemia of 2.6, improving alkalosis and renal indices.       Past Medical History:   Diagnosis Date    A-fib     Anticoagulant long-term use     Anxiety     Aortic aneurysm     Cataract     CHF (congestive heart failure)     Chronic atrial fibrillation     COPD (chronic obstructive pulmonary disease)     Coronary artery disease     Depression     HLD (hyperlipidemia)     Hypertension     Mitral regurgitation     PAD (peripheral artery disease)     Primary open angle glaucoma (POAG)        Past Surgical History:   Procedure Laterality Date    ANGIOGRAM, CORONARY, WITH LEFT HEART CATHETERIZATION N/A 03/26/2024    Procedure: Angiogram, Coronary, with Left Heart Cath;  Surgeon: Adriano Montiel MD;  Location: [facility] CATH LAB;  Service: Cardiology;  Laterality:  N/A;    ATHERECTOMY OF PERIPHERAL VESSEL Left 09/12/2022    LEFT SFA ATHERECTOMY, BALLOON ANGIOPLASTY    CATARACT EXTRACTION W/  INTRAOCULAR LENS IMPLANT Left 03/09/2023    Procedure: EXTRACTION, CATARACT, WITH IOL INSERTION;  Surgeon: Georgi Borja MD;  Location: [facility];  Service: Ophthalmology;  Laterality: Left;  19.5  mac    EGD, WITH CLOSED BIOPSY  03/22/2024    Procedure: EGD, WITH CLOSED BIOPSY;  Surgeon: Ronald Calderon MD;  Location: [facility] ENDOSCOPY;  Service: Gastroenterology;;    ESOPHAGOGASTRODUODENOSCOPY N/A 03/22/2024    Procedure: EGD;  Surgeon: Ronald Calderon MD;  Location: [facility] ENDOSCOPY;  Service: Gastroenterology;  Laterality: N/A;    Heart Stent N/A     > 10yrs. AGO    INCISION AND DRAINAGE N/A 03/18/2024    Procedure: Incision and Drainage;  Surgeon: Fahad Rivas MD;  Location: [facility];  Service: Urology;  Laterality: N/A;  I&D SCROTAL ABSCESS    INSERTION OF STENT INTO PERIPHERAL VESSEL Right 10/17/2022    RIGHT SFA ATHERECTOMY, BALLOON ANGIOPLASTY, STENT; RIGHT ANTERIOR TIBIAL ARTERY ATHERECTOMY, BALLOON ANGIOPLASTY    ORCHIECTOMY Left 03/18/2024    Procedure: ORCHIECTOMY;  Surgeon: Fahad Rivas MD;  Location: [facility];  Service: Urology;  Laterality: Left;       Social History[1]    Current Outpatient Medications   Medication Instructions    acetaminophen 650 mg, 2 times daily PRN    ALBUTEROL INHL 2 puffs, Every 6 hours PRN    albuterol-ipratropium (DUO-NEB) 2.5 mg-0.5 mg/3 mL nebulizer solution 3 mLs, Nebulization, Every 6 hours PRN, Rescue    aspirin (ECOTRIN) 81 MG EC tablet 1 tablet, Daily    atorvastatin (LIPITOR) 80 mg, Oral, Daily    BREZTRI AEROSPHERE 160-9-4.8 mcg/actuation HFAA 2 puffs, 2 times daily    cetirizine (ZYRTEC) 10 mg, Oral, Daily    clopidogreL (PLAVIX) 75 mg, Oral, Daily    folic acid (FOLVITE) 1 mg, Oral, Daily    furosemide (LASIX) 40 mg, Oral, 2 times daily    gabapentin (NEURONTIN) 300 mg, Oral, 3 times daily     metoprolol succinate (TOPROL-XL) 12.5 mg, Oral, Daily    nicotine (NICODERM CQ) 14 mg/24 hr 1 patch, Transdermal, Daily PRN    nicotine (NICODERM CQ) 21 mg/24 hr 1 patch, Transdermal, Daily    nitrofurantoin, macrocrystal-monohydrate, (MACROBID) 100 MG capsule 100 mg, Oral, 2 times daily    olopatadine (PATANOL) 0.1 % ophthalmic solution Apply to eye.    pantoprazole (PROTONIX) 40 mg, Oral, 2 times daily    potassium chloride SA (K-DUR,KLOR-CON) 20 MEQ tablet 20 mEq, Oral, 2 times daily    rivaroxaban (XARELTO) 20 mg, Oral, Daily    sacubitriL-valsartan (ENTRESTO) 49-51 mg per tablet 1 tablet, Daily    sertraline (ZOLOFT) 100 mg, Oral, Daily    urea (CARMOL) 40 % Crea Apply topically.    varenicline tartrate (CHANTIX) 0.5 mg    vitamin D (VITAMIN D3) 1,000 Units, Daily     Current Inpatient Medications   atorvastatin  80 mg Oral Daily    clopidogreL  75 mg Oral Daily    folic acid  1 mg Oral Daily    hydrocortisone sodium succinate  50 mg Intravenous Q8H    ipratropium  0.5 mg Nebulization Q12H    levalbuterol  1.25 mg Nebulization Q12H    miconazole NITRATE 2 %   Topical (Top) BID    pantoprazole  40 mg Oral BID    potassium chloride  20 mEq Intravenous Q2H    QUEtiapine  100 mg Oral QHS    rivaroxaban  20 mg Oral Daily    spironolactone  25 mg Oral Daily    vitamin D  1,000 Units Oral Daily     Current Intravenous Infusions   0.9% NaCl   Intravenous Continuous   Stopped at 05/04/25 2254    DOBUTamine IV infusion (non-titrating)  2.5 mcg/kg/min Intravenous Continuous 3.4 mL/hr at 05/10/25 0527 2.5 mcg/kg/min at 05/10/25 0527     ROS: neg unless stated above        Objective:     Intake/Output Summary (Last 24 hours) at 5/10/2025 6793  Last data filed at 5/10/2025 0527  Gross per 24 hour   Intake 971.55 ml   Output 2700 ml   Net -1728.45 ml     Vital Signs (Most Recent):  Temp: 98.9 °F (37.2 °C) (05/10/25 0304)  Pulse: 101 (05/10/25 0710)  Resp: (!) 23 (05/10/25 0710)  BP: 111/77 (05/10/25 0702)  SpO2: 97 %  (05/10/25 0710)  Body mass index is 26.64 kg/m².  Weight: 89.1 kg (196 lb 6.9 oz) Vital Signs (24h Range):  Temp:  [98.1 °F (36.7 °C)-99.1 °F (37.3 °C)] 98.9 °F (37.2 °C)  Pulse:  [] 101  Resp:  [12-31] 23  SpO2:  [89 %-100 %] 97 %  BP: ()/() 111/77     Physical Exam:  General: No acute distress, on room air  HEENT: NC/AT; PERRL; scleral icterus, nasal and oral mucosa moist and clear  Pulm: + mild rales bibasilar and mild expiratory wheezing noted.  CV: S1, S2 irregular irregular w/o murmurs or gallops; trace to 1+ edema noted of BLE  GI: Bowel sound present in all quadrants, non tender and non distended   MSK: Full ROM of all extremities and spine w/o limitation or discomfort  Derm: No rashes, abnormal bruising, or skin lesions  Neuro: AAOx3; motor/sensory function intact  Psych: Cooperative    Lines/Drains/Airways       Peripherally Inserted Central Catheter Line  Duration             PICC Triple Lumen 04/24/25 0942 right brachial 15 days              Drain  Duration                  Urethral Catheter 05/05/25 0808 Silicone 16 Fr. 4 days                  Significant Labs:  Lab Results   Component Value Date    WBC 9.18 05/10/2025    HGB 7.5 (L) 05/10/2025    HCT 22.6 (L) 05/10/2025    MCV 75.3 (L) 05/10/2025     05/10/2025       BMP  Lab Results   Component Value Date     (L) 05/10/2025    K 2.6 (LL) 05/10/2025    CO2 33 (H) 05/10/2025    BUN 55.5 (H) 05/10/2025    CREATININE 1.27 (H) 05/10/2025    CALCIUM 9.8 05/10/2025    AGAP 14.0 05/10/2025    EGFRNONAA 88 (L) 12/06/2021     ABG  Recent Labs   Lab 05/08/25  1341   PH 7.610*   PO2 72.0*   PCO2 47.0*   HCO3 47.2*     Mechanical Ventilation Support:  Oxygen Concentration (%): 28 (05/10/25 0710)    Significant Imaging:  I have reviewed the pertinent imaging within the past 24 hours.    Assessment/Plan:     Assessment  Cardiogenic Shock  Lactic Acidosis   HFrEF (22%, G3DD), Pulmonary Hypertension  NOCAD, NICMO  NSTEMI likely  demand  Chronic persistent Afib  Hx of VT arrest (has life vest)  ELIZABETH   Adrenal Insufficiency   Low cortisol on ata stim test and normal plast ACTH level  COPD   Microcytic Anemia   Low TSAT 7%  Prolonged QTC  Groin itch  Anxiety/Insomnia     Plan  - Continue ongoing ICU level of care  - Appreciate cardiology recommendations  - Continue Dobutamine to 2.5 mcg/min   - Continue to hold IV Bumex and oral metolazone   - Continue with Aldactone 25 mg once daily. Will start Entresto when BP able to tolerate  - Keep K>4, Phos > 3 and Mg > 2  - Discontinue ASA, Continue plavix and statin  - Continue Xarelto 20 mg daily  - Hold beta blocker until patient euvolemic again; Midodrine discontinued.  - Avoid nephrotoxins   - Continue Lev albuterol and ipratropium q12h  - Strict I&O and daily weights  - LDSSI   - Continue Miconazole powder BID   - Continue Seroquel to 100 mg qhs; Ativan prn ordered  - Continue IV Hydrocortisone 50 mg q8h     CODE STATUS: Full Code  Access: PICC and PIV  Antibiotics: none  Diet: Cardiac  DVT Prophylaxis: Xarelto 20 mg  GI Prophylaxis: PPI  Fluids: None     32 minutes of critical care was time spent personally by me on the following activities: development of treatment plan with patient or surrogate and bedside caregivers, discussions with consultants, evaluation of patient's response to treatment, examination of patient, ordering and performing treatments and interventions, ordering and review of laboratory studies, ordering and review of radiographic studies, pulse oximetry, re-evaluation of patient's condition.  This critical care time did not overlap with that of any other provider or involve time for any procedures.     Valerio Shrestha MD PGY 2  Pulmonary Critical Care Medicine  [facility] - [facility] Surg Telemetry         [1]   Social History  Socioeconomic History    Marital status: Single   Tobacco Use    Smoking status: Every Day     Current packs/day: 0.50     Average  packs/day: 0.8 packs/day for 50.4 years (40.9 ttl pk-yrs)     Types: Cigarettes     Start date: 1975     Passive exposure: Current    Smokeless tobacco: Never    Tobacco comments:     States smoke 2-3 cigarettes per day   Substance and Sexual Activity    Alcohol use: Yes     Alcohol/week: 3.0 standard drinks of alcohol     Types: 3 Cans of beer per week     Comment: socially    Drug use: Not Currently     Frequency: 1.0 times per week     Types: Marijuana     Comment: no use in over 1 year    Sexual activity: Not Currently     Partners: Female     Social Drivers of Health     Financial Resource Strain: Low Risk  (4/23/2025)    Overall Financial Resource Strain (CARDIA)     Difficulty of Paying Living Expenses: Not hard at all   Food Insecurity: No Food Insecurity (4/23/2025)    Hunger Vital Sign     Worried About Running Out of Food in the Last Year: Never true     Ran Out of Food in the Last Year: Never true   Transportation Needs: No Transportation Needs (4/23/2025)    PRAPARE - Transportation     Lack of Transportation (Medical): No     Lack of Transportation (Non-Medical): No   Physical Activity: Inactive (12/17/2024)    Exercise Vital Sign     Days of Exercise per Week: 0 days     Minutes of Exercise per Session: 0 min   Stress: No Stress Concern Present (4/23/2025)    [place] [employer] - Occupational Stress Questionnaire     Feeling of Stress : Not at all   Housing Stability: Low Risk  (4/23/2025)    Housing Stability Vital Sign     Unable to Pay for Housing in the Last Year: No     Homeless in the Last Year: No

## 2025-05-11 LAB
ANION GAP SERPL CALC-SCNC: 12 MEQ/L
ANION GAP SERPL CALC-SCNC: 14 MEQ/L
BASOPHILS # BLD AUTO: 0.01 X10(3)/MCL
BASOPHILS NFR BLD AUTO: 0.1 %
BUN SERPL-MCNC: 49.1 MG/DL (ref 8.4–25.7)
BUN SERPL-MCNC: 49.6 MG/DL (ref 8.4–25.7)
BUN SERPL-MCNC: 50.2 MG/DL (ref 8.4–25.7)
BUN SERPL-MCNC: 50.4 MG/DL (ref 8.4–25.7)
BUN SERPL-MCNC: 52 MG/DL (ref 8.4–25.7)
CALCIUM SERPL-MCNC: 10.3 MG/DL (ref 8.8–10)
CALCIUM SERPL-MCNC: 10.4 MG/DL (ref 8.8–10)
CALCIUM SERPL-MCNC: 10.6 MG/DL (ref 8.8–10)
CHLORIDE SERPL-SCNC: 88 MMOL/L (ref 98–107)
CHLORIDE SERPL-SCNC: 89 MMOL/L (ref 98–107)
CHLORIDE SERPL-SCNC: 90 MMOL/L (ref 98–107)
CHLORIDE SERPL-SCNC: 91 MMOL/L (ref 98–107)
CHLORIDE SERPL-SCNC: 91 MMOL/L (ref 98–107)
CO2 SERPL-SCNC: 30 MMOL/L (ref 23–31)
CO2 SERPL-SCNC: 31 MMOL/L (ref 23–31)
CO2 SERPL-SCNC: 31 MMOL/L (ref 23–31)
CREAT SERPL-MCNC: 1.09 MG/DL (ref 0.72–1.25)
CREAT SERPL-MCNC: 1.11 MG/DL (ref 0.72–1.25)
CREAT SERPL-MCNC: 1.15 MG/DL (ref 0.72–1.25)
CREAT SERPL-MCNC: 1.2 MG/DL (ref 0.72–1.25)
CREAT SERPL-MCNC: 1.2 MG/DL (ref 0.72–1.25)
CREAT/UREA NIT SERPL: 41
CREAT/UREA NIT SERPL: 42
CREAT/UREA NIT SERPL: 45
EOSINOPHIL # BLD AUTO: 0.04 X10(3)/MCL (ref 0–0.9)
EOSINOPHIL NFR BLD AUTO: 0.4 %
ERYTHROCYTE [DISTWIDTH] IN BLOOD BY AUTOMATED COUNT: 26.4 % (ref 11.5–17)
GFR SERPLBLD CREATININE-BSD FMLA CKD-EPI: >60 ML/MIN/1.73/M2
GLUCOSE SERPL-MCNC: 101 MG/DL (ref 82–115)
GLUCOSE SERPL-MCNC: 124 MG/DL (ref 82–115)
GLUCOSE SERPL-MCNC: 133 MG/DL (ref 82–115)
GLUCOSE SERPL-MCNC: 138 MG/DL (ref 82–115)
GLUCOSE SERPL-MCNC: 151 MG/DL (ref 82–115)
HCT VFR BLD AUTO: 24.3 % (ref 42–52)
HGB BLD-MCNC: 7.9 G/DL (ref 14–18)
HOLD SPECIMEN: NORMAL
IMM GRANULOCYTES # BLD AUTO: 0.2 X10(3)/MCL (ref 0–0.04)
IMM GRANULOCYTES NFR BLD AUTO: 2.2 %
LACTATE SERPL-SCNC: 1.8 MMOL/L (ref 0.5–2.2)
LYMPHOCYTES # BLD AUTO: 0.8 X10(3)/MCL (ref 0.6–4.6)
LYMPHOCYTES NFR BLD AUTO: 8.6 %
MAGNESIUM SERPL-MCNC: 2 MG/DL (ref 1.6–2.6)
MAGNESIUM SERPL-MCNC: 2.1 MG/DL (ref 1.6–2.6)
MAGNESIUM SERPL-MCNC: 2.2 MG/DL (ref 1.6–2.6)
MCH RBC QN AUTO: 24.8 PG (ref 27–31)
MCHC RBC AUTO-ENTMCNC: 32.5 G/DL (ref 33–36)
MCV RBC AUTO: 76.2 FL (ref 80–94)
MONOCYTES # BLD AUTO: 1.1 X10(3)/MCL (ref 0.1–1.3)
MONOCYTES NFR BLD AUTO: 11.8 %
NEUTROPHILS # BLD AUTO: 7.15 X10(3)/MCL (ref 2.1–9.2)
NEUTROPHILS NFR BLD AUTO: 76.9 %
NRBC BLD AUTO-RTO: 0 %
PHOSPHATE SERPL-MCNC: 3.2 MG/DL (ref 2.3–4.7)
PHOSPHATE SERPL-MCNC: 3.3 MG/DL (ref 2.3–4.7)
PHOSPHATE SERPL-MCNC: 3.3 MG/DL (ref 2.3–4.7)
PLATELET # BLD AUTO: 166 X10(3)/MCL (ref 130–400)
PLATELETS.RETICULATED NFR BLD AUTO: 6.9 % (ref 0.9–11.2)
PMV BLD AUTO: ABNORMAL FL
POCT GLUCOSE: 130 MG/DL (ref 70–110)
POTASSIUM SERPL-SCNC: 3 MMOL/L (ref 3.5–5.1)
POTASSIUM SERPL-SCNC: 3.1 MMOL/L (ref 3.5–5.1)
POTASSIUM SERPL-SCNC: 3.3 MMOL/L (ref 3.5–5.1)
POTASSIUM SERPL-SCNC: 3.6 MMOL/L (ref 3.5–5.1)
POTASSIUM SERPL-SCNC: 4 MMOL/L (ref 3.5–5.1)
RBC # BLD AUTO: 3.19 X10(6)/MCL (ref 4.7–6.1)
SODIUM SERPL-SCNC: 132 MMOL/L (ref 136–145)
SODIUM SERPL-SCNC: 134 MMOL/L (ref 136–145)
SODIUM SERPL-SCNC: 135 MMOL/L (ref 136–145)
WBC # BLD AUTO: 9.3 X10(3)/MCL (ref 4.5–11.5)

## 2025-05-11 PROCEDURE — 25000003 PHARM REV CODE 250

## 2025-05-11 PROCEDURE — 25000242 PHARM REV CODE 250 ALT 637 W/ HCPCS

## 2025-05-11 PROCEDURE — 20000000 HC ICU ROOM

## 2025-05-11 PROCEDURE — 94640 AIRWAY INHALATION TREATMENT: CPT

## 2025-05-11 PROCEDURE — 63600175 PHARM REV CODE 636 W HCPCS

## 2025-05-11 PROCEDURE — 27000207 HC ISOLATION

## 2025-05-11 PROCEDURE — 80048 BASIC METABOLIC PNL TOTAL CA: CPT

## 2025-05-11 PROCEDURE — 27000221 HC OXYGEN, UP TO 24 HOURS

## 2025-05-11 PROCEDURE — 36415 COLL VENOUS BLD VENIPUNCTURE: CPT

## 2025-05-11 PROCEDURE — 85025 COMPLETE CBC W/AUTO DIFF WBC: CPT

## 2025-05-11 PROCEDURE — 94761 N-INVAS EAR/PLS OXIMETRY MLT: CPT

## 2025-05-11 PROCEDURE — 83735 ASSAY OF MAGNESIUM: CPT

## 2025-05-11 PROCEDURE — 83605 ASSAY OF LACTIC ACID: CPT

## 2025-05-11 PROCEDURE — 84100 ASSAY OF PHOSPHORUS: CPT

## 2025-05-11 PROCEDURE — 25000003 PHARM REV CODE 250: Performed by: STUDENT IN AN ORGANIZED HEALTH CARE EDUCATION/TRAINING PROGRAM

## 2025-05-11 RX ORDER — DIPHENHYDRAMINE HYDROCHLORIDE 50 MG/ML
25 INJECTION, SOLUTION INTRAMUSCULAR; INTRAVENOUS EVERY 6 HOURS PRN
Status: DISCONTINUED | OUTPATIENT
Start: 2025-05-11 | End: 2025-05-18

## 2025-05-11 RX ORDER — POTASSIUM CHLORIDE 14.9 MG/ML
20 INJECTION INTRAVENOUS
Status: COMPLETED | OUTPATIENT
Start: 2025-05-11 | End: 2025-05-11

## 2025-05-11 RX ADMIN — MICONAZOLE NITRATE: 20 POWDER TOPICAL at 08:05

## 2025-05-11 RX ADMIN — PANTOPRAZOLE SODIUM 40 MG: 40 TABLET, DELAYED RELEASE ORAL at 08:05

## 2025-05-11 RX ADMIN — POTASSIUM CHLORIDE 20 MEQ: 14.9 INJECTION, SOLUTION INTRAVENOUS at 11:05

## 2025-05-11 RX ADMIN — IPRATROPIUM BROMIDE 0.5 MG: 0.5 SOLUTION RESPIRATORY (INHALATION) at 08:05

## 2025-05-11 RX ADMIN — RIVAROXABAN 20 MG: 10 TABLET, FILM COATED ORAL at 05:05

## 2025-05-11 RX ADMIN — DIPHENHYDRAMINE HYDROCHLORIDE 25 MG: 50 INJECTION INTRAMUSCULAR; INTRAVENOUS at 06:05

## 2025-05-11 RX ADMIN — POTASSIUM CHLORIDE 20 MEQ: 14.9 INJECTION, SOLUTION INTRAVENOUS at 05:05

## 2025-05-11 RX ADMIN — HYDROCORTISONE SODIUM SUCCINATE 50 MG: 100 INJECTION, POWDER, FOR SOLUTION INTRAMUSCULAR; INTRAVENOUS at 02:05

## 2025-05-11 RX ADMIN — IPRATROPIUM BROMIDE 0.5 MG: 0.5 SOLUTION RESPIRATORY (INHALATION) at 07:05

## 2025-05-11 RX ADMIN — GUAIFENESIN 200 MG: 100 SOLUTION ORAL at 12:05

## 2025-05-11 RX ADMIN — QUETIAPINE FUMARATE 100 MG: 100 TABLET ORAL at 08:05

## 2025-05-11 RX ADMIN — POTASSIUM CHLORIDE 20 MEQ: 14.9 INJECTION, SOLUTION INTRAVENOUS at 12:05

## 2025-05-11 RX ADMIN — FOLIC ACID 1 MG: 1 TABLET ORAL at 08:05

## 2025-05-11 RX ADMIN — HYDROCORTISONE SODIUM SUCCINATE 50 MG: 100 INJECTION, POWDER, FOR SOLUTION INTRAMUSCULAR; INTRAVENOUS at 06:05

## 2025-05-11 RX ADMIN — SPIRONOLACTONE 25 MG: 25 TABLET, FILM COATED ORAL at 08:05

## 2025-05-11 RX ADMIN — LEVALBUTEROL HYDROCHLORIDE 1.25 MG: 1.25 SOLUTION RESPIRATORY (INHALATION) at 08:05

## 2025-05-11 RX ADMIN — CLOPIDOGREL BISULFATE 75 MG: 75 TABLET, FILM COATED ORAL at 08:05

## 2025-05-11 RX ADMIN — MICONAZOLE NITRATE: 20 POWDER TOPICAL at 09:05

## 2025-05-11 RX ADMIN — ACETAMINOPHEN 650 MG: 325 TABLET, FILM COATED ORAL at 07:05

## 2025-05-11 RX ADMIN — ATORVASTATIN CALCIUM 80 MG: 40 TABLET, FILM COATED ORAL at 08:05

## 2025-05-11 RX ADMIN — Medication 1000 UNITS: at 08:05

## 2025-05-11 RX ADMIN — HYDROCORTISONE: 10 CREAM TOPICAL at 05:05

## 2025-05-11 RX ADMIN — HYDROCORTISONE SODIUM SUCCINATE 50 MG: 100 INJECTION, POWDER, FOR SOLUTION INTRAMUSCULAR; INTRAVENOUS at 09:05

## 2025-05-11 RX ADMIN — POTASSIUM CHLORIDE 20 MEQ: 14.9 INJECTION, SOLUTION INTRAVENOUS at 02:05

## 2025-05-11 RX ADMIN — LEVALBUTEROL HYDROCHLORIDE 1.25 MG: 1.25 SOLUTION RESPIRATORY (INHALATION) at 07:05

## 2025-05-11 NOTE — PROGRESS NOTES
[facility] - [address] [place] [address] Telemetry  Pulmonary Critical Care Note    Patient Name: Ran Reyes Jr.  MRN: [id number]  Admission Date: 4/21/2025  Hospital Length of Stay: 19 days  Code Status: Full Code  Attending Provider: Trisha German MD  Primary Care Provider: Abiodun Recinos DO     Subjective:     HPI:   Ran Reyes Jr. is a 69 y.o. male with  PMH of tobacco dependence, chronic obstructive pulmonary disease, hypertension, pulmonary hypertension, hyperlipidemia, chronic persistent atrial fibrillation on Xarelto, nonobstructive coronary artery disease, nonischemic cardiomyopathy, heart failure with with reduced ejection fraction (EF 30%), peripheral vascular disease s/p stenting to superficial femoral artery and right tibial artery, renée's gangrene (03/2024), primary open-angle glaucoma, who presented to [facility] ED (4/21/2025) due to generalized fatigue and weakness x 3-5 days. Patient reports he can only ambulate about 20-30 feet before having to stop due to claudication pain which is chronic and unchanged compared to baseline. Since running out of his diuretic medications approximately 5 days ago he has noted progressively worsening lower extremity swelling causing leg heaviness and weakness exacerbating difficulty ambulating.  He also notes acute on chronic cough productive of a brownish sputum without hemoptysis.  Denies fever, chills, sweats.  Denies chest pain.  Endorses shortness of breath at rest and with ambulation but unchanged compared to baseline.  Denies abdominal pain, nausea, vomiting, constipation, diarrhea.  Denies increased or decreased urinary frequency, dysuria, or hematuria.  Sleeps with 2 pillows at night due to baseline orthopnea. Has not had to increase number of pillows or change sleeping habits. Wears 2L [place] home oxygen at baseline for hx of COPD. Denies having increased oxygen requirements prior to ED presentation.     ED course:  Vital signs stable.  Oxygenation  stable on 2 L nasal cannula.  CBC shows microcytic anemic (HGB 9.0; MCV 77).  CMP shows hyponatremia (Na 128), acidemia (CO2 14), elevated renal indices (BUN 25; creatinine 1.93), elevated alkaline phosphatase (). Troponin WNL.  BNP 26 39.5.  UDS negative.  EKG showed atrial fibrillation with PVCs.  Chest x-ray difficult to interpret due to overlying medical devices however appears to show cardiomegaly with bilateral pulmonary vascular congestion with question of bilateral pleural effusions. He was admitted for acute CHF exacerbation and cardiorenal syndrome.        Hospital Course/Significant events:  4/23/25: Admit to ICU, started on pressors and inotropes for cardiogenic / septic shock   5/2/25: Downgraded to    5/3/25: Re-upgraded to ICU 2/2 worsening lactate and agonal breathing     24 Hour Interval History:  NAEON. BP stable and on room air. Remained on Dobutamine 2.5 mcg/min all day yesterday and overnight and doing well. Lactic acid normal at 1.8. BMP noted with hypokalemia and replenishing as needed. Renal indices stable and alkalosis resolved.       Past Medical History:   Diagnosis Date    A-fib     Anticoagulant long-term use     Anxiety     Aortic aneurysm     Cataract     CHF (congestive heart failure)     Chronic atrial fibrillation     COPD (chronic obstructive pulmonary disease)     Coronary artery disease     Depression     HLD (hyperlipidemia)     Hypertension     Mitral regurgitation     PAD (peripheral artery disease)     Primary open angle glaucoma (POAG)        Past Surgical History:   Procedure Laterality Date    ANGIOGRAM, CORONARY, WITH LEFT HEART CATHETERIZATION N/A 03/26/2024    Procedure: Angiogram, Coronary, with Left Heart Cath;  Surgeon: Adriano Montiel MD;  Location: [facility] CATH LAB;  Service: Cardiology;  Laterality: N/A;    ATHERECTOMY OF PERIPHERAL VESSEL Left 09/12/2022    LEFT SFA ATHERECTOMY, BALLOON ANGIOPLASTY    CATARACT EXTRACTION W/  INTRAOCULAR LENS IMPLANT Left  03/09/2023    Procedure: EXTRACTION, CATARACT, WITH IOL INSERTION;  Surgeon: Georgi Borja MD;  Location: [facility];  Service: Ophthalmology;  Laterality: Left;  19.5  mac    EGD, WITH CLOSED BIOPSY  03/22/2024    Procedure: EGD, WITH CLOSED BIOPSY;  Surgeon: Ronald Calderon MD;  Location: [facility] ENDOSCOPY;  Service: Gastroenterology;;    ESOPHAGOGASTRODUODENOSCOPY N/A 03/22/2024    Procedure: EGD;  Surgeon: Ronald Calderon MD;  Location: [facility] ENDOSCOPY;  Service: Gastroenterology;  Laterality: N/A;    Heart Stent N/A     > 10yrs. AGO    INCISION AND DRAINAGE N/A 03/18/2024    Procedure: Incision and Drainage;  Surgeon: Fahad Rivas MD;  Location: [facility];  Service: Urology;  Laterality: N/A;  I&D SCROTAL ABSCESS    INSERTION OF STENT INTO PERIPHERAL VESSEL Right 10/17/2022    RIGHT SFA ATHERECTOMY, BALLOON ANGIOPLASTY, STENT; RIGHT ANTERIOR TIBIAL ARTERY ATHERECTOMY, BALLOON ANGIOPLASTY    ORCHIECTOMY Left 03/18/2024    Procedure: ORCHIECTOMY;  Surgeon: Fahad Rivas MD;  Location: [facility];  Service: Urology;  Laterality: Left;       Social History[1]    Current Outpatient Medications   Medication Instructions    acetaminophen 650 mg, 2 times daily PRN    ALBUTEROL INHL 2 puffs, Every 6 hours PRN    albuterol-ipratropium (DUO-NEB) 2.5 mg-0.5 mg/3 mL nebulizer solution 3 mLs, Nebulization, Every 6 hours PRN, Rescue    aspirin (ECOTRIN) 81 MG EC tablet 1 tablet, Daily    atorvastatin (LIPITOR) 80 mg, Oral, Daily    BREZTRI AEROSPHERE 160-9-4.8 mcg/actuation HFAA 2 puffs, 2 times daily    cetirizine (ZYRTEC) 10 mg, Oral, Daily    clopidogreL (PLAVIX) 75 mg, Oral, Daily    folic acid (FOLVITE) 1 mg, Oral, Daily    furosemide (LASIX) 40 mg, Oral, 2 times daily    gabapentin (NEURONTIN) 300 mg, Oral, 3 times daily    metoprolol succinate (TOPROL-XL) 12.5 mg, Oral, Daily    nicotine (NICODERM CQ) 14 mg/24 hr 1 patch, Transdermal, Daily PRN    nicotine (NICODERM CQ) 21 mg/24 hr 1  patch, Transdermal, Daily    nitrofurantoin, macrocrystal-monohydrate, (MACROBID) 100 MG capsule 100 mg, Oral, 2 times daily    olopatadine (PATANOL) 0.1 % ophthalmic solution Apply to eye.    pantoprazole (PROTONIX) 40 mg, Oral, 2 times daily    potassium chloride SA (K-DUR,KLOR-CON) 20 MEQ tablet 20 mEq, Oral, 2 times daily    rivaroxaban (XARELTO) 20 mg, Oral, Daily    sacubitriL-valsartan (ENTRESTO) 49-51 mg per tablet 1 tablet, Daily    sertraline (ZOLOFT) 100 mg, Oral, Daily    urea (CARMOL) 40 % Crea Apply topically.    varenicline tartrate (CHANTIX) 0.5 mg    vitamin D (VITAMIN D3) 1,000 Units, Daily     Current Inpatient Medications   atorvastatin  80 mg Oral Daily    clopidogreL  75 mg Oral Daily    folic acid  1 mg Oral Daily    hydrocortisone sodium succinate  50 mg Intravenous Q8H    ipratropium  0.5 mg Nebulization Q12H    levalbuterol  1.25 mg Nebulization Q12H    miconazole NITRATE 2 %   Topical (Top) BID    pantoprazole  40 mg Oral BID    QUEtiapine  100 mg Oral QHS    rivaroxaban  20 mg Oral Daily    spironolactone  25 mg Oral Daily    vitamin D  1,000 Units Oral Daily     Current Intravenous Infusions   0.9% NaCl   Intravenous Continuous   Stopped at 05/04/25 2254    DOBUTamine IV infusion (non-titrating)  2.5 mcg/kg/min Intravenous Continuous 3.4 mL/hr at 05/10/25 0527 2.5 mcg/kg/min at 05/10/25 0527     ROS: neg unless stated above        Objective:     Intake/Output Summary (Last 24 hours) at 5/11/2025 0935  Last data filed at 5/11/2025 0728  Gross per 24 hour   Intake --   Output 2290 ml   Net -2290 ml     Vital Signs (Most Recent):  Temp: 98.1 °F (36.7 °C) (05/11/25 0853)  Pulse: 90 (05/11/25 0917)  Resp: 18 (05/11/25 0917)  BP: 111/71 (05/11/25 0902)  SpO2: 96 % (05/11/25 0917)  Body mass index is 26.64 kg/m².  Weight: 89.1 kg (196 lb 6.9 oz) Vital Signs (24h Range):  Temp:  [97.7 °F (36.5 °C)-98.2 °F (36.8 °C)] 98.1 °F (36.7 °C)  Pulse:  [] 90  Resp:  [16-34] 18  SpO2:  [87 %-100 %]  96 %  BP: ()/(64-85) 111/71     Physical Exam:  General: No acute distress, on room air  HEENT: NC/AT; PERRL; scleral icterus, nasal and oral mucosa moist and clear  Pulm: + mild rales bibasilar and mild expiratory wheezing noted.  CV: S1, S2 irregular irregular w/o murmurs or gallops; trace to 1+ edema noted of BLE  GI: Bowel sound present in all quadrants, non tender and non distended   MSK: Full ROM of all extremities and spine w/o limitation or discomfort  Derm: No rashes, abnormal bruising, or skin lesions  Neuro: AAOx3; motor/sensory function intact  Psych: Cooperative    Lines/Drains/Airways       Peripherally Inserted Central Catheter Line  Duration             PICC Triple Lumen 04/24/25 0942 right brachial 16 days              Drain  Duration                  Urethral Catheter 05/05/25 0808 Silicone 16 Fr. 6 days                  Significant Labs:  Lab Results   Component Value Date    WBC 9.30 05/11/2025    HGB 7.9 (L) 05/11/2025    HCT 24.3 (L) 05/11/2025    MCV 76.2 (L) 05/11/2025     05/11/2025       BMP  Lab Results   Component Value Date     (L) 05/11/2025    K 3.0 (L) 05/11/2025    CO2 31 05/11/2025    BUN 50.4 (H) 05/11/2025    CREATININE 1.11 05/11/2025    CALCIUM 10.3 (H) 05/11/2025    AGAP 14.0 05/11/2025    EGFRNONAA 88 (L) 12/06/2021     ABG  Recent Labs   Lab 05/08/25  1341   PH 7.610*   PO2 72.0*   PCO2 47.0*   HCO3 47.2*     Mechanical Ventilation Support:  Oxygen Concentration (%): 28 (05/11/25 0053)    Significant Imaging:  I have reviewed the pertinent imaging within the past 24 hours.    Assessment/Plan:     Assessment  Cardiogenic Shock  Lactic Acidosis   HFrEF (22%, G3DD), Pulmonary Hypertension  NOCAD, NICMO  NSTEMI likely demand  Chronic persistent Afib  Hx of VT arrest (has life vest)  ELIZABETH   Adrenal Insufficiency   Low cortisol on ata stim test and normal plast ACTH level  MRI brain to be done inpatient vs outpatient  COPD   Microcytic Anemia   Low TSAT  7%  Prolonged QTC  Groin itch  Anxiety/Insomnia  Hypokalemia     Plan  - Continue ongoing ICU level of care  - Appreciate cardiology recommendations   Will need to discuss long term goal for patient. Would consider weaning off Dobutamine and reassessment of lactic acid  - For now continue Dobutamine to 2.5 mcg/min   - Continue to hold IV Bumex and oral metolazone   - Continue with Aldactone 25 mg once daily. Will start Entresto when BP able to tolerate  - Keep K>4, Phos > 3 and Mg > 2  - Discontinue ASA, Continue plavix and statin  - Continue Xarelto 20 mg daily  - Hold beta blocker until patient euvolemic again; Midodrine discontinued.  - Avoid nephrotoxins   - Continue Lev albuterol and ipratropium q12h  - Strict I&O and daily weights  - LDSSI   - Continue Miconazole powder BID   - Continue Seroquel to 100 mg qhs; Ativan prn ordered  - Continue IV Hydrocortisone 50 mg q8h   Steroids also possible cause of metabolic alkalosis and hypokalemia. Would consider Endocrinology consult for assistance with adrenal insufficiency and steroid management       CODE STATUS: Full Code  Access: PICC and PIV  Antibiotics: none  Diet: Cardiac  DVT Prophylaxis: Xarelto 20 mg  GI Prophylaxis: PPI  Fluids: None     32 minutes of critical care was time spent personally by me on the following activities: development of treatment plan with patient or surrogate and bedside caregivers, discussions with consultants, evaluation of patient's response to treatment, examination of patient, ordering and performing treatments and interventions, ordering and review of laboratory studies, ordering and review of radiographic studies, pulse oximetry, re-evaluation of patient's condition.  This critical care time did not overlap with that of any other provider or involve time for any procedures.     Valerio Shrestha MD PGY 2  Pulmonary Critical Care Medicine  [facility] Telemetry         [1]   Social History  Socioeconomic  History    Marital status: Single   Tobacco Use    Smoking status: Every Day     Current packs/day: 0.50     Average packs/day: 0.8 packs/day for 50.4 years (40.9 ttl pk-yrs)     Types: Cigarettes     Start date: 1975     Passive exposure: Current    Smokeless tobacco: Never    Tobacco comments:     States smoke 2-3 cigarettes per day   Substance and Sexual Activity    Alcohol use: Yes     Alcohol/week: 3.0 standard drinks of alcohol     Types: 3 Cans of beer per week     Comment: socially    Drug use: Not Currently     Frequency: 1.0 times per week     Types: Marijuana     Comment: no use in over 1 year    Sexual activity: Not Currently     Partners: Female     Social Drivers of Health     Financial Resource Strain: Low Risk  (4/23/2025)    Overall Financial Resource Strain (CARDIA)     Difficulty of Paying Living Expenses: Not hard at all   Food Insecurity: No Food Insecurity (4/23/2025)    Hunger Vital Sign     Worried About Running Out of Food in the Last Year: Never true     Ran Out of Food in the Last Year: Never true   Transportation Needs: No Transportation Needs (4/23/2025)    PRAPARE - Transportation     Lack of Transportation (Medical): No     Lack of Transportation (Non-Medical): No   Physical Activity: Inactive (12/17/2024)    Exercise Vital Sign     Days of Exercise per Week: 0 days     Minutes of Exercise per Session: 0 min   Stress: No Stress Concern Present (4/23/2025)    [employer] - Occupational Stress Questionnaire     Feeling of Stress : Not at all   Housing Stability: Low Risk  (4/23/2025)    Housing Stability Vital Sign     Unable to Pay for Housing in the Last Year: No     Homeless in the Last Year: No

## 2025-05-12 LAB
ANION GAP SERPL CALC-SCNC: 11 MEQ/L
ANION GAP SERPL CALC-SCNC: 12 MEQ/L
ANION GAP SERPL CALC-SCNC: 12 MEQ/L
ANION GAP SERPL CALC-SCNC: 13 MEQ/L
ANION GAP SERPL CALC-SCNC: 14 MEQ/L
BASOPHILS # BLD AUTO: 0 X10(3)/MCL
BASOPHILS NFR BLD AUTO: 0 %
BUN SERPL-MCNC: 44.5 MG/DL (ref 8.4–25.7)
BUN SERPL-MCNC: 44.8 MG/DL (ref 8.4–25.7)
BUN SERPL-MCNC: 44.8 MG/DL (ref 8.4–25.7)
BUN SERPL-MCNC: 45.1 MG/DL (ref 8.4–25.7)
BUN SERPL-MCNC: 49.4 MG/DL (ref 8.4–25.7)
CALCIUM SERPL-MCNC: 10.2 MG/DL (ref 8.8–10)
CALCIUM SERPL-MCNC: 10.2 MG/DL (ref 8.8–10)
CALCIUM SERPL-MCNC: 10.3 MG/DL (ref 8.8–10)
CALCIUM SERPL-MCNC: 10.3 MG/DL (ref 8.8–10)
CALCIUM SERPL-MCNC: 9.9 MG/DL (ref 8.8–10)
CHLORIDE SERPL-SCNC: 93 MMOL/L (ref 98–107)
CHLORIDE SERPL-SCNC: 93 MMOL/L (ref 98–107)
CHLORIDE SERPL-SCNC: 94 MMOL/L (ref 98–107)
CHLORIDE SERPL-SCNC: 95 MMOL/L (ref 98–107)
CHLORIDE SERPL-SCNC: 96 MMOL/L (ref 98–107)
CO2 SERPL-SCNC: 26 MMOL/L (ref 23–31)
CO2 SERPL-SCNC: 28 MMOL/L (ref 23–31)
CO2 SERPL-SCNC: 29 MMOL/L (ref 23–31)
CREAT SERPL-MCNC: 0.97 MG/DL (ref 0.72–1.25)
CREAT SERPL-MCNC: 1 MG/DL (ref 0.72–1.25)
CREAT SERPL-MCNC: 1.01 MG/DL (ref 0.72–1.25)
CREAT SERPL-MCNC: 1.08 MG/DL (ref 0.72–1.25)
CREAT SERPL-MCNC: 1.09 MG/DL (ref 0.72–1.25)
CREAT/UREA NIT SERPL: 41
CREAT/UREA NIT SERPL: 44
CREAT/UREA NIT SERPL: 45
CREAT/UREA NIT SERPL: 45
CREAT/UREA NIT SERPL: 46
EOSINOPHIL # BLD AUTO: 0.06 X10(3)/MCL (ref 0–0.9)
EOSINOPHIL NFR BLD AUTO: 0.6 %
ERYTHROCYTE [DISTWIDTH] IN BLOOD BY AUTOMATED COUNT: 26.2 % (ref 11.5–17)
GFR SERPLBLD CREATININE-BSD FMLA CKD-EPI: >60 ML/MIN/1.73/M2
GLUCOSE SERPL-MCNC: 107 MG/DL (ref 82–115)
GLUCOSE SERPL-MCNC: 115 MG/DL (ref 82–115)
GLUCOSE SERPL-MCNC: 131 MG/DL (ref 82–115)
GLUCOSE SERPL-MCNC: 141 MG/DL (ref 82–115)
GLUCOSE SERPL-MCNC: 147 MG/DL (ref 82–115)
HCT VFR BLD AUTO: 24.7 % (ref 42–52)
HGB BLD-MCNC: 8 G/DL (ref 14–18)
HOLD SPECIMEN: NORMAL
IMM GRANULOCYTES # BLD AUTO: 0.07 X10(3)/MCL (ref 0–0.04)
IMM GRANULOCYTES NFR BLD AUTO: 0.7 %
LACTATE SERPL-SCNC: 1.8 MMOL/L (ref 0.5–2.2)
LACTATE SERPL-SCNC: 2 MMOL/L (ref 0.5–2.2)
LYMPHOCYTES # BLD AUTO: 0.68 X10(3)/MCL (ref 0.6–4.6)
LYMPHOCYTES NFR BLD AUTO: 7 %
MAGNESIUM SERPL-MCNC: 1.9 MG/DL (ref 1.6–2.6)
MAGNESIUM SERPL-MCNC: 2 MG/DL (ref 1.6–2.6)
MCH RBC QN AUTO: 24.8 PG (ref 27–31)
MCHC RBC AUTO-ENTMCNC: 32.4 G/DL (ref 33–36)
MCV RBC AUTO: 76.7 FL (ref 80–94)
MONOCYTES # BLD AUTO: 1.03 X10(3)/MCL (ref 0.1–1.3)
MONOCYTES NFR BLD AUTO: 10.7 %
NEUTROPHILS # BLD AUTO: 7.81 X10(3)/MCL (ref 2.1–9.2)
NEUTROPHILS NFR BLD AUTO: 81 %
NRBC BLD AUTO-RTO: 0 %
PHOSPHATE SERPL-MCNC: 3.3 MG/DL (ref 2.3–4.7)
PHOSPHATE SERPL-MCNC: 3.4 MG/DL (ref 2.3–4.7)
PHOSPHATE SERPL-MCNC: 3.4 MG/DL (ref 2.3–4.7)
PHOSPHATE SERPL-MCNC: 3.5 MG/DL (ref 2.3–4.7)
PHOSPHATE SERPL-MCNC: 3.8 MG/DL (ref 2.3–4.7)
PLATELET # BLD AUTO: 154 X10(3)/MCL (ref 130–400)
PLATELETS.RETICULATED NFR BLD AUTO: 4.3 % (ref 0.9–11.2)
PMV BLD AUTO: ABNORMAL FL
POCT GLUCOSE: 124 MG/DL (ref 70–110)
POCT GLUCOSE: 142 MG/DL (ref 70–110)
POCT GLUCOSE: 155 MG/DL (ref 70–110)
POTASSIUM SERPL-SCNC: 3.5 MMOL/L (ref 3.5–5.1)
POTASSIUM SERPL-SCNC: 3.5 MMOL/L (ref 3.5–5.1)
POTASSIUM SERPL-SCNC: 3.7 MMOL/L (ref 3.5–5.1)
POTASSIUM SERPL-SCNC: 3.8 MMOL/L (ref 3.5–5.1)
POTASSIUM SERPL-SCNC: 4.3 MMOL/L (ref 3.5–5.1)
RBC # BLD AUTO: 3.22 X10(6)/MCL (ref 4.7–6.1)
SODIUM SERPL-SCNC: 134 MMOL/L (ref 136–145)
SODIUM SERPL-SCNC: 135 MMOL/L (ref 136–145)
SODIUM SERPL-SCNC: 135 MMOL/L (ref 136–145)
WBC # BLD AUTO: 9.65 X10(3)/MCL (ref 4.5–11.5)

## 2025-05-12 PROCEDURE — 97535 SELF CARE MNGMENT TRAINING: CPT

## 2025-05-12 PROCEDURE — 25000242 PHARM REV CODE 250 ALT 637 W/ HCPCS

## 2025-05-12 PROCEDURE — 97530 THERAPEUTIC ACTIVITIES: CPT

## 2025-05-12 PROCEDURE — 94640 AIRWAY INHALATION TREATMENT: CPT

## 2025-05-12 PROCEDURE — 27000221 HC OXYGEN, UP TO 24 HOURS

## 2025-05-12 PROCEDURE — 25000003 PHARM REV CODE 250

## 2025-05-12 PROCEDURE — 63600175 PHARM REV CODE 636 W HCPCS

## 2025-05-12 PROCEDURE — 25000003 PHARM REV CODE 250: Performed by: STUDENT IN AN ORGANIZED HEALTH CARE EDUCATION/TRAINING PROGRAM

## 2025-05-12 PROCEDURE — 83735 ASSAY OF MAGNESIUM: CPT

## 2025-05-12 PROCEDURE — 36415 COLL VENOUS BLD VENIPUNCTURE: CPT

## 2025-05-12 PROCEDURE — 97116 GAIT TRAINING THERAPY: CPT

## 2025-05-12 PROCEDURE — 20000000 HC ICU ROOM

## 2025-05-12 PROCEDURE — 84100 ASSAY OF PHOSPHORUS: CPT

## 2025-05-12 PROCEDURE — 99233 SBSQ HOSP IP/OBS HIGH 50: CPT | Mod: GC,,, | Performed by: INTERNAL MEDICINE

## 2025-05-12 PROCEDURE — 94761 N-INVAS EAR/PLS OXIMETRY MLT: CPT

## 2025-05-12 PROCEDURE — 80048 BASIC METABOLIC PNL TOTAL CA: CPT

## 2025-05-12 PROCEDURE — 27000207 HC ISOLATION

## 2025-05-12 PROCEDURE — 83605 ASSAY OF LACTIC ACID: CPT

## 2025-05-12 PROCEDURE — 85025 COMPLETE CBC W/AUTO DIFF WBC: CPT

## 2025-05-12 RX ORDER — LANOLIN ALCOHOL/MO/W.PET/CERES
1 CREAM (GRAM) TOPICAL
Status: DISCONTINUED | OUTPATIENT
Start: 2025-05-12 | End: 2025-05-20 | Stop reason: HOSPADM

## 2025-05-12 RX ORDER — POTASSIUM CHLORIDE 20 MEQ/1
40 TABLET, EXTENDED RELEASE ORAL ONCE
Status: COMPLETED | OUTPATIENT
Start: 2025-05-12 | End: 2025-05-12

## 2025-05-12 RX ADMIN — PANTOPRAZOLE SODIUM 40 MG: 40 TABLET, DELAYED RELEASE ORAL at 08:05

## 2025-05-12 RX ADMIN — DIPHENHYDRAMINE HYDROCHLORIDE 25 MG: 50 INJECTION INTRAMUSCULAR; INTRAVENOUS at 03:05

## 2025-05-12 RX ADMIN — LEVALBUTEROL HYDROCHLORIDE 1.25 MG: 1.25 SOLUTION RESPIRATORY (INHALATION) at 07:05

## 2025-05-12 RX ADMIN — POTASSIUM CHLORIDE 40 MEQ: 1500 TABLET, EXTENDED RELEASE ORAL at 05:05

## 2025-05-12 RX ADMIN — MICONAZOLE NITRATE: 20 POWDER TOPICAL at 09:05

## 2025-05-12 RX ADMIN — HYDROCORTISONE SODIUM SUCCINATE 50 MG: 100 INJECTION, POWDER, FOR SOLUTION INTRAMUSCULAR; INTRAVENOUS at 05:05

## 2025-05-12 RX ADMIN — Medication 1000 UNITS: at 08:05

## 2025-05-12 RX ADMIN — PANTOPRAZOLE SODIUM 40 MG: 40 TABLET, DELAYED RELEASE ORAL at 09:05

## 2025-05-12 RX ADMIN — FOLIC ACID 1 MG: 1 TABLET ORAL at 08:05

## 2025-05-12 RX ADMIN — SACUBITRIL AND VALSARTAN 1 TABLET: 24; 26 TABLET, FILM COATED ORAL at 09:05

## 2025-05-12 RX ADMIN — SACUBITRIL AND VALSARTAN 1 TABLET: 24; 26 TABLET, FILM COATED ORAL at 12:05

## 2025-05-12 RX ADMIN — HYDROCORTISONE SODIUM SUCCINATE 50 MG: 100 INJECTION, POWDER, FOR SOLUTION INTRAMUSCULAR; INTRAVENOUS at 09:05

## 2025-05-12 RX ADMIN — HYDROCORTISONE SODIUM SUCCINATE 50 MG: 100 INJECTION, POWDER, FOR SOLUTION INTRAMUSCULAR; INTRAVENOUS at 01:05

## 2025-05-12 RX ADMIN — ATORVASTATIN CALCIUM 80 MG: 40 TABLET, FILM COATED ORAL at 08:05

## 2025-05-12 RX ADMIN — FERROUS SULFATE TAB 325 MG (65 MG ELEMENTAL FE) 1 EACH: 325 (65 FE) TAB at 01:05

## 2025-05-12 RX ADMIN — CLOPIDOGREL BISULFATE 75 MG: 75 TABLET, FILM COATED ORAL at 08:05

## 2025-05-12 RX ADMIN — SPIRONOLACTONE 25 MG: 25 TABLET, FILM COATED ORAL at 08:05

## 2025-05-12 RX ADMIN — POTASSIUM CHLORIDE 40 MEQ: 1500 TABLET, EXTENDED RELEASE ORAL at 02:05

## 2025-05-12 RX ADMIN — IPRATROPIUM BROMIDE 0.5 MG: 0.5 SOLUTION RESPIRATORY (INHALATION) at 07:05

## 2025-05-12 RX ADMIN — QUETIAPINE FUMARATE 100 MG: 100 TABLET ORAL at 09:05

## 2025-05-12 RX ADMIN — RIVAROXABAN 20 MG: 10 TABLET, FILM COATED ORAL at 05:05

## 2025-05-12 RX ADMIN — DIPHENHYDRAMINE HYDROCHLORIDE 25 MG: 50 INJECTION INTRAMUSCULAR; INTRAVENOUS at 09:05

## 2025-05-12 RX ADMIN — MICONAZOLE NITRATE: 20 POWDER TOPICAL at 08:05

## 2025-05-12 NOTE — PLAN OF CARE
Problem: Skin Injury Risk Increased  Goal: Skin Health and Integrity  Outcome: Progressing     Problem: Adult Inpatient Plan of Care  Goal: Plan of Care Review  Outcome: Progressing  Goal: Patient-Specific Goal (Individualized)  Outcome: Progressing  Goal: Absence of Hospital-Acquired Illness or Injury  Outcome: Progressing  Goal: Optimal Comfort and Wellbeing  Outcome: Progressing  Goal: Readiness for Transition of Care  Outcome: Progressing     Problem: COPD (Chronic Obstructive Pulmonary Disease)  Goal: Optimal Chronic Illness Coping  Outcome: Progressing  Goal: Optimal Level of Functional [place]  Outcome: Progressing  Goal: Absence of Infection Signs and Symptoms  Outcome: Progressing  Goal: Improved Oral Intake  Outcome: Progressing     Problem: Heart Failure  Goal: Optimal Coping  Outcome: Progressing  Goal: Optimal Cardiac Output  Outcome: Progressing  Goal: Stable Heart Rate and Rhythm  Outcome: Progressing  Goal: Optimal Functional Ability  Outcome: Progressing  Goal: Fluid and Electrolyte Balance  Outcome: Progressing  Goal: Improved Oral Intake  Outcome: Progressing     Problem: Fall Injury Risk  Goal: Absence of Fall and Fall-Related Injury  Outcome: Progressing     Problem: Wound  Goal: Optimal Coping  Outcome: Progressing  Goal: Optimal Functional Ability  Outcome: Progressing  Goal: Absence of Infection Signs and Symptoms  Outcome: Progressing  Goal: Improved Oral Intake  Outcome: Progressing  Goal: Optimal Pain Control and Function  Outcome: Progressing  Goal: Skin Health and Integrity  Outcome: Progressing  Goal: Optimal Wound Healing  Outcome: Progressing     Problem: Acute Kidney Injury/Impairment  Goal: Fluid and Electrolyte Balance  Outcome: Progressing  Goal: Improved Oral Intake  Outcome: Progressing  Goal: Effective Renal Function  Outcome: Progressing     Problem: Dysrhythmia  Goal: Normalized Cardiac Rhythm  Outcome: Progressing     Problem: Infection  Goal: Absence of Infection  Signs and Symptoms  Outcome: Progressing

## 2025-05-12 NOTE — PT/OT/SLP PROGRESS
Physical Therapy Treatment    Patient Name:  Ran Reyes Jr.   MRN:  [id number]    Recommendations     Therapy Intensity Recommendations at Discharge: Moderate Intensity Therapy  Discharge Equipment Recommendations: bath bench   Barriers to discharge: fall risk, severity of deficits, decreased endurance, and decreased safety awareness    Assessment     Ran Reyes Jr. is a 69 y.o. male admitted with a medical diagnosis of:  1. ELIZABETH (acute kidney injury)    2. Weakness    3. Dyspnea    4. Screening due    5. HFrEF (heart failure with reduced ejection fraction)    6. Acidemia    7. Microcytic anemia    8. Tobacco dependency    9. Acute on chronic combined systolic and diastolic congestive heart failure    10. Bradycardia    11. QT prolongation    12. Heart failure with reduced ejection fraction    13. Heart failure    14. At risk for long QT syndrome    15. Chest pain    16. Acute combined systolic and diastolic congestive heart failure    17. Nonrheumatic mitral valve regurgitation       Problem List[1]   He presents with the following impairments/functional limitations:  weakness, impaired endurance, impaired self care skills, impaired functional mobility, gait instability, impaired balance.    Pt. With increase agitation during session.    Rehab Prognosis: Fair.    Patient would benefit from continued skilled acute PT services to: address above listed impairments/functional limitations; receive patient/caregiver education; reduce fall risk; and maximize independency/safety with functional mobility.    Recent Surgery: * No surgery found *      Plan     During this hospitalization, patient to be seen 5 x/week to address the identified impairments/functional limitations via gait training, therapeutic activities, therapeutic exercises and progress toward the established goals.    Plan of Care Expires:   (Discharge)    Subjective     Communicated with patient's nurse (Moe) prior to session.    Patient agreeable  "to participate in treatment session.    Chief Complaint: "I am aggravated."  Patient/Family Comments/goals: None stated.  Pain/Comfort:  Pain Rating 1: 0/10  Pain Rating Post-Intervention 1: 0/10    Objective     Patient found sitting edge of bed with blood pressure cuff, pulse ox (continuous), telemetry, booth catheter, peripheral IV (Life vest)  upon PT entry to room.    General Precautions: Standard, contact, fall   Orthopedic Precautions:N/A   Braces:  N/A  Respiratory Status: room air    Functional Mobility:    Bed Mobility:  N/A - Patient seated edge of bed at start of session and left seated edge of bed at end of session    Transfers:  Sit to Stand: stand by assistance with rollator (from bed and bedside commode)  Stand to Sit: stand by assistance with rollator (in bed and bedside commode)    Gait:  Patient ambulated 260ft with rollator and stand by assistance.  Patient demonstrates :       steady gait       no loss of balance       no mis-steps.  Pt. Required frequent short standing rest breaks, secondary to shortness of breath    Other Mobility:  N/A    Patient left sitting edge of bed with all lines intact, call button in reach, tray table at bedside, and nurse present.    DME Justifications:  No DME recommended requiring DME justifications    Education     Patient educated on and assisted with functional mobility as noted above.  Patient educated on PT Plan of Care.  Patient was instructed to utilize staff assistance for mobility/transfers.  White board updated regarding patient's safest level of mobility with staff assistance    Goals     Multidisciplinary Problems       Physical Therapy Goals          Problem: Physical Therapy    Goal Priority Disciplines Outcome Interventions   Physical Therapy Goal     PT, PT/OT Progressing    Description: REVIEWED 05/10/2025  Goals to be met by: DISCHARGE  Patient will increase functional independence with mobility by performin. Sit <=> stand w/ RW at [facility]. - " ONGOING  2. Bed <=> chair w/ RW at [facility]. - ONGOING  3. Ambulate 130' w/ RW at [facility]. - ONGOING                     Time Tracking     PT Received On: 05/12/25  PT Start Time: 1352     PT Stop Time: 1411  PT Total Time (min): 19 min     Billable Minutes: Gait Training 19 minutes    05/12/2025       [1]   Patient Active Problem List  Diagnosis    Primary open angle glaucoma (POAG) of right eye, moderate stage    Combined forms of age-related cataract of left eye    Arteriosclerosis of coronary artery    Chronic atrial fibrillation    Dyslipidemia    Hypertension    Tobacco use    PVD (peripheral vascular disease)    VHD (valvular heart disease)    COVID-19    HFrEF (heart failure with reduced ejection fraction)    Nonrheumatic mitral valve regurgitation    Postoperative eye state    Scrotal hematoma    Chronic obstructive pulmonary disease, unspecified    Lesion of external ear    Low back pain    Other thrombophilia    Secondary hyperaldosteronism    Positive colorectal cancer screening using Cologuard test    Acute heart failure    History of COPD    CHF (congestive heart failure)    Acute cystitis with hematuria    Tobacco dependency    Moderate malnutrition

## 2025-05-12 NOTE — PROGRESS NOTES
[facility] - [address] [place] [address] Telemetry  Pulmonary Critical Care Note    Patient Name: Ran Reyes Jr.  MRN: [id number]  Admission Date: 4/21/2025  Hospital Length of Stay: 20 days  Code Status: Full Code  Attending Provider: Trisha German MD  Primary Care Provider: Abiodun Recinos DO     Subjective:     HPI:   Ran Reyes Jr. is a 69 y.o. 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 Patient reports he can only ambulate about 20-30 feet before having to stop due to claudication pain which is chronic and unchanged compared to baseline. Since running out of his diuretic medications approximately 5 days ago he has noted progressively worsening lower extremity swelling causing leg heaviness and weakness exacerbating difficulty ambulating.  He also notes acute on chronic cough productive of a brownish sputum without hemoptysis.  Denies fever, chills, sweats.  Denies chest pain.  Endorses shortness of breath at rest and with ambulation but unchanged compared to baseline.  Denies abdominal pain, nausea, vomiting, constipation, diarrhea.  Denies increased or decreased urinary frequency, dysuria, or hematuria.  Sleeps with 2 pillows at night due to baseline orthopnea. Has not had to increase number of pillows or change sleeping habits. Wears 2L NC home oxygen at baseline for hx of COPD. Denies having increased oxygen requirements prior to ED presentation.     ED course:  Vital signs stable.  Oxygenation  stable on 2 L nasal cannula.  CBC shows microcytic anemic (HGB 9.0; MCV 77).  CMP shows hyponatremia (Na 128), acidemia (CO2 14), elevated renal indices (BUN 25; creatinine 1.93), elevated alkaline phosphatase (). Troponin WNL.  BNP 26 39.5.  UDS negative.  EKG showed atrial fibrillation with PVCs.  Chest x-ray difficult to interpret due to overlying medical devices however appears to show cardiomegaly with bilateral pulmonary vascular congestion with question of bilateral pleural effusions. He was admitted for acute CHF exacerbation and cardiorenal syndrome.      Hospital Course/Significant events:  4/23/25: Admit to ICU, started on pressors and inotropes for cardiogenic / septic shock   5/2/25: Downgraded to    5/3/25: Re-upgraded to ICU 2/2 worsening lactate and agonal breathing    24 Hour Interval History:  NAEON.  VSS.  Continues on dobutamine 2.5 micrograms/minute.  Patient denies any acute complaints today.  Ambulating well with physical therapy.  CBC showed stable microcytic anemia.  CMP showed mild hyponatremia, mild hyperchloremia, mild hypercalcemia.    Past Medical History:   Diagnosis Date    A-fib     Anticoagulant long-term use     Anxiety     Aortic aneurysm     Cataract     CHF (congestive heart failure)     Chronic atrial fibrillation     COPD (chronic obstructive pulmonary disease)     Coronary artery disease     Depression     HLD (hyperlipidemia)     Hypertension     Mitral regurgitation     PAD (peripheral artery disease)     Primary open angle glaucoma (POAG)        Past Surgical History:   Procedure Laterality Date    ANGIOGRAM, CORONARY, WITH LEFT HEART CATHETERIZATION N/A 03/26/2024    Procedure: Angiogram, Coronary, with Left Heart Cath;  Surgeon: Adriano Montiel MD;  Location: [facility] CATH LAB;  Service: Cardiology;  Laterality: N/A;    ATHERECTOMY OF PERIPHERAL VESSEL Left 09/12/2022    LEFT SFA ATHERECTOMY, BALLOON ANGIOPLASTY    CATARACT EXTRACTION W/  INTRAOCULAR LENS IMPLANT  Left 03/09/2023    Procedure: EXTRACTION, CATARACT, WITH IOL INSERTION;  Surgeon: Georgi Borja MD;  Location: [facility];  Service: Ophthalmology;  Laterality: Left;  19.5  mac    EGD, WITH CLOSED BIOPSY  03/22/2024    Procedure: EGD, WITH CLOSED BIOPSY;  Surgeon: Ronald Calderon MD;  Location: [facility] ENDOSCOPY;  Service: Gastroenterology;;    ESOPHAGOGASTRODUODENOSCOPY N/A 03/22/2024    Procedure: EGD;  Surgeon: Ronald Calderon MD;  Location: [facility] ENDOSCOPY;  Service: Gastroenterology;  Laterality: N/A;    Heart Stent N/A     > 10yrs. AGO    INCISION AND DRAINAGE N/A 03/18/2024    Procedure: Incision and Drainage;  Surgeon: Fahad Rivas MD;  Location: [facility];  Service: Urology;  Laterality: N/A;  I&D SCROTAL ABSCESS    INSERTION OF STENT INTO PERIPHERAL VESSEL Right 10/17/2022    RIGHT SFA ATHERECTOMY, BALLOON ANGIOPLASTY, STENT; RIGHT ANTERIOR TIBIAL ARTERY ATHERECTOMY, BALLOON ANGIOPLASTY    ORCHIECTOMY Left 03/18/2024    Procedure: ORCHIECTOMY;  Surgeon: Fahad Rivas MD;  Location: [facility];  Service: Urology;  Laterality: Left;       Social History[1]    Current Outpatient Medications   Medication Instructions    acetaminophen 650 mg, 2 times daily PRN    ALBUTEROL INHL 2 puffs, Every 6 hours PRN    albuterol-ipratropium (DUO-NEB) 2.5 mg-0.5 mg/3 mL nebulizer solution 3 mLs, Nebulization, Every 6 hours PRN, Rescue    aspirin (ECOTRIN) 81 MG EC tablet 1 tablet, Daily    atorvastatin (LIPITOR) 80 mg, Oral, Daily    BREZTRI AEROSPHERE 160-9-4.8 mcg/actuation HFAA 2 puffs, 2 times daily    cetirizine (ZYRTEC) 10 mg, Oral, Daily    clopidogreL (PLAVIX) 75 mg, Oral, Daily    folic acid (FOLVITE) 1 mg, Oral, Daily    furosemide (LASIX) 40 mg, Oral, 2 times daily    gabapentin (NEURONTIN) 300 mg, Oral, 3 times daily    metoprolol succinate (TOPROL-XL) 12.5 mg, Oral, Daily    nicotine (NICODERM CQ) 14 mg/24 hr 1 patch, Transdermal, Daily PRN    nicotine (NICODERM CQ) 21 mg/24 hr  1 patch, Transdermal, Daily    nitrofurantoin, macrocrystal-monohydrate, (MACROBID) 100 MG capsule 100 mg, Oral, 2 times daily    olopatadine (PATANOL) 0.1 % ophthalmic solution Apply to eye.    pantoprazole (PROTONIX) 40 mg, Oral, 2 times daily    potassium chloride SA (K-DUR,KLOR-CON) 20 MEQ tablet 20 mEq, Oral, 2 times daily    rivaroxaban (XARELTO) 20 mg, Oral, Daily    sacubitriL-valsartan (ENTRESTO) 49-51 mg per tablet 1 tablet, Daily    sertraline (ZOLOFT) 100 mg, Oral, Daily    urea (CARMOL) 40 % Crea Apply topically.    varenicline tartrate (CHANTIX) 0.5 mg    vitamin D (VITAMIN D3) 1,000 Units, Daily     Current Inpatient Medications   atorvastatin  80 mg Oral Daily    clopidogreL  75 mg Oral Daily    folic acid  1 mg Oral Daily    hydrocortisone sodium succinate  50 mg Intravenous Q8H    ipratropium  0.5 mg Nebulization Q12H    levalbuterol  1.25 mg Nebulization Q12H    miconazole NITRATE 2 %   Topical (Top) BID    pantoprazole  40 mg Oral BID    QUEtiapine  100 mg Oral QHS    rivaroxaban  20 mg Oral Daily    sacubitriL-valsartan  1 tablet Oral BID    spironolactone  25 mg Oral Daily    vitamin D  1,000 Units Oral Daily     Current Intravenous Infusions   0.9% NaCl   Intravenous Continuous   Stopped at 05/04/25 2254    DOBUTamine IV infusion (non-titrating)  2.5 mcg/kg/min Intravenous Continuous 3.4 mL/hr at 05/12/25 1000 2.5 mcg/kg/min at 05/12/25 1000     ROS: neg unless stated above        Objective:     Intake/Output Summary (Last 24 hours) at 5/12/2025 1131  Last data filed at 5/12/2025 1000  Gross per 24 hour   Intake 13.75 ml   Output 1425 ml   Net -1411.25 ml     Vital Signs (Most Recent):  Temp: 98 °F (36.7 °C) (05/12/25 0800)  Pulse: 85 (05/12/25 1100)  Resp: (!) 24 (05/12/25 1100)  BP: 109/72 (05/12/25 1100)  SpO2: 98 % (05/12/25 1100)  Body mass index is 26.64 kg/m².  Weight: 89.1 kg (196 lb 6.9 oz) Vital Signs (24h Range):  Temp:  [97.7 °F (36.5 °C)-98.1 °F (36.7 °C)] 98 °F (36.7 °C)  Pulse:   [] 85  Resp:  [12-47] 24  SpO2:  [73 %-100 %] 98 %  BP: ()/(68-91) 109/72     Physical Exam:  Physical Exam  Constitutional:       General: He is not in acute distress.     Appearance: Normal appearance. He is not ill-appearing.   HENT:      Head: Normocephalic and atraumatic.   Eyes:      General: No scleral icterus.        Right eye: No discharge.         Left eye: No discharge.      Extraocular Movements: Extraocular movements intact.      Conjunctiva/sclera: Conjunctivae normal.      Pupils: Pupils are equal, round, and reactive to light.   Cardiovascular:      Rate and Rhythm: Regular rhythm. Tachycardia present.      Pulses: Normal pulses.      Heart sounds: Murmur heard.      No friction rub. No gallop.   Pulmonary:      Effort: Pulmonary effort is normal. No respiratory distress.      Breath sounds: Normal breath sounds. No stridor. No wheezing, rhonchi or rales.   Abdominal:      General: Abdomen is flat. Bowel sounds are normal. There is no distension.      Palpations: Abdomen is soft.      Tenderness: There is no abdominal tenderness. There is no guarding.   Musculoskeletal:         General: Swelling present. Normal range of motion.      Right lower leg: Edema present.      Left lower leg: Edema present.      Comments: 1+ pitting edema to BLE   Skin:     General: Skin is warm and dry.   Neurological:      General: No focal deficit present.      Mental Status: He is alert and oriented to person, place, and time.           Lines/Drains/Airways       Peripherally Inserted Central Catheter Line  Duration             PICC Triple Lumen 04/24/25 0942 right brachial 18 days              Drain  Duration                  Urethral Catheter 05/05/25 0808 Silicone 16 Fr. 7 days                  Significant Labs:  Lab Results   Component Value Date    WBC 9.65 05/12/2025    HGB 8.0 (L) 05/12/2025    HCT 24.7 (L) 05/12/2025    MCV 76.7 (L) 05/12/2025     05/12/2025       BMP  Lab Results   Component  Value Date     (L) 05/12/2025    K 3.7 05/12/2025    CO2 28 05/12/2025    BUN 44.8 (H) 05/12/2025    CREATININE 1.08 05/12/2025    CALCIUM 10.2 (H) 05/12/2025    AGAP 14.0 05/12/2025    EGFRNONAA 88 (L) 12/06/2021     ABG  Recent Labs   Lab 05/08/25  1341   PH 7.610*   PO2 72.0*   PCO2 47.0*   HCO3 47.2*     Mechanical Ventilation Support:  Oxygen Concentration (%): 28 (05/12/25 0044)    Significant Imaging:  I have reviewed the pertinent imaging within the past 24 hours.    Assessment/Plan:     Assessment  Cardiogenic Shock   HFmrEF with pulmonary hypertension  TTE showed 22% with PASP 59 mm Hg and grade 3 diastolic dysfunction  NOCAD, NICMO  NSTEMI likely demand  Chronic persistent Afib  Hx of VT arrest (has life vest)  Cardiorenal syndrome (improved)   Adrenal Insufficiency   Low cortisol on ata stim test and normal plast ACTH level  MRI brain to be done inpatient vs outpatient  COPD   Microcytic Anemia   Low TSAT 7%  Prolonged QTC  Groin itch  Anxiety/Insomnia  Hypokalemia     Plan  Continue ongoing ICU level of care  Cardiology following; appreciate assistance  We will attempt discontinuing dobutamine gtt today  Recheck lactic acid 2 hours after discontinuation of dobutamine  Initiate Entresto 24-26 mg b.i.d.  Continue spironolactone 25 mg q.d.  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  Continue to hold IV Bumex and oral metolazone; we will consider giving dose of IV Bumex this afternoon after patient has been off dobutamine for some time  Maintain K>4, Phos > 3 and Mg > 2  Continue antiplatelet monotherapy with plavix   Continue IV Hydrocortisone 50 mg q8h due to persistent hypotension previously  Strict I&O and daily weights  LDSSI   Continue Miconazole powder BID   Continue remaining medications:  Levalbuterol and ipratropium q.12 hours, Atorvastatin 80 mg, quetiapine 100 mg  q.h.s., q.d., ferrous sulfate 325 mg q.48h, folic acid 1 mg a day    DVT Prophylaxis: Xarelto 20 mg  GI Prophylaxis: PPI     32 minutes of critical care was time spent personally by me on the following activities: development of treatment plan with patient or surrogate and bedside caregivers, discussions with consultants, evaluation of patient's response to treatment, examination of patient, ordering and performing treatments and interventions, ordering and review of laboratory studies, ordering and review of radiographic studies, pulse oximetry, re-evaluation of patient's condition.  This critical care time did not overlap with that of any other provider or involve time for any procedures.     Sagar Napoles MD  [facility]  Pulmonary Critical Care Medicine  [facility] - [facility] Surg Telemetry         [1]   Social History  Socioeconomic History    Marital status: Single   Tobacco Use    Smoking status: Every Day     Current packs/day: 0.50     Average packs/day: 0.8 packs/day for 50.4 years (40.9 ttl pk-yrs)     Types: Cigarettes     Start date: 1975     Passive exposure: Current    Smokeless tobacco: Never    Tobacco comments:     States smoke 2-3 cigarettes per day   Substance and Sexual Activity    Alcohol use: Yes     Alcohol/week: 3.0 standard drinks of alcohol     Types: 3 Cans of beer per week     Comment: socially    Drug use: Not Currently     Frequency: 1.0 times per week     Types: Marijuana     Comment: no use in over 1 year    Sexual activity: Not Currently     Partners: Female     Social Drivers of Health     Financial Resource Strain: Low Risk  (4/23/2025)    Overall Financial Resource Strain (CARDIA)     Difficulty of Paying Living Expenses: Not hard at all   Food Insecurity: No Food Insecurity (4/23/2025)    Hunger Vital Sign     Worried About Running Out of Food in the Last Year: Never true     Ran Out of Food in the Last Year: Never true   Transportation Needs: No  Transportation Needs (4/23/2025)    PRAPARE - Transportation     Lack of Transportation (Medical): No     Lack of Transportation (Non-Medical): No   Physical Activity: Inactive (12/17/2024)    Exercise Vital Sign     Days of Exercise per Week: 0 days     Minutes of Exercise per Session: 0 min   Stress: No Stress Concern Present (4/23/2025)    [employer] - Occupational Stress Questionnaire     Feeling of Stress : Not at all   Housing Stability: Low Risk  (4/23/2025)    Housing Stability Vital Sign     Unable to Pay for Housing in the Last Year: No     Homeless in the Last Year: No

## 2025-05-12 NOTE — PROGRESS NOTES
Cardiology Progress Note     Cardiology Attending: Dayanna Johnson MD  Cardiology Fellow: eVrnon Cifuentes MD     Date of Admit: 4/21/2025    History of Present Illness:      Ran Reyes Jr. is a 69 y.o. male with NICM-HFrEF (LVEF 20-25%; G3DD), HTN, non obstructive CAD, AF on OAC, PAD, HLD, and COPD who presented with acute on chronic decompensated heart failure in cardiogenic shock. Cardiogenic shock is now resolved.     No acute events reported overnight. The patient denies dyspnea or angina. Reports overall feeling improved from last evaluation (Friday).     Net negative 22 L since admission. Net negative 1.7 L overnight. Weight 89.1 kg on 5/10/25;  97.5 kg on admission.       Past Medical History:     Past Medical History:   Diagnosis Date    A-fib     Anticoagulant long-term use     Anxiety     Aortic aneurysm     Cataract     CHF (congestive heart failure)     Chronic atrial fibrillation     COPD (chronic obstructive pulmonary disease)     Coronary artery disease     Depression     HLD (hyperlipidemia)     Hypertension     Mitral regurgitation     PAD (peripheral artery disease)     Primary open angle glaucoma (POAG)      Surgical History:     Past Surgical History:   Procedure Laterality Date    ANGIOGRAM, CORONARY, WITH LEFT HEART CATHETERIZATION N/A 03/26/2024    Procedure: Angiogram, Coronary, with Left Heart Cath;  Surgeon: Adriano Montiel MD;  Location: [facility] CATH LAB;  Service: Cardiology;  Laterality: N/A;    ATHERECTOMY OF PERIPHERAL VESSEL Left 09/12/2022    LEFT SFA ATHERECTOMY, BALLOON ANGIOPLASTY    CATARACT EXTRACTION W/  INTRAOCULAR LENS IMPLANT Left 03/09/2023    Procedure: EXTRACTION, CATARACT, WITH IOL INSERTION;  Surgeon: Georgi Borja MD;  Location: [facility];  Service: Ophthalmology;  Laterality: Left;  19.5  mac    EGD, WITH CLOSED BIOPSY  03/22/2024    Procedure: EGD, WITH CLOSED BIOPSY;  Surgeon: Ronald Calderon MD;  Location: [facility] ENDOSCOPY;  Service:  Gastroenterology;;    ESOPHAGOGASTRODUODENOSCOPY N/A 03/22/2024    Procedure: EGD;  Surgeon: Ronald Calderon MD;  Location: [facility] ENDOSCOPY;  Service: Gastroenterology;  Laterality: N/A;    Heart Stent N/A     > 10yrs. AGO    INCISION AND DRAINAGE N/A 03/18/2024    Procedure: Incision and Drainage;  Surgeon: Fahad Rivas MD;  Location: [facility] OR;  Service: Urology;  Laterality: N/A;  I&D SCROTAL ABSCESS    INSERTION OF STENT INTO PERIPHERAL VESSEL Right 10/17/2022    RIGHT SFA ATHERECTOMY, BALLOON ANGIOPLASTY, STENT; RIGHT ANTERIOR TIBIAL ARTERY ATHERECTOMY, BALLOON ANGIOPLASTY    ORCHIECTOMY Left 03/18/2024    Procedure: ORCHIECTOMY;  Surgeon: Fahad Rivas MD;  Location: [facility];  Service: Urology;  Laterality: Left;     Allergies:   Review of patient's allergies indicates:  No Known Allergies  Family History:     Family History   Problem Relation Name Age of Onset    Cancer Mother      Hypertension Mother      Heart failure Father      Stroke Father      Hypertension Father      No Known Problems Sister       Social History:   Social History[1]  Review of Systems:     The patient denies headaches, changes in vision, nausea, emesis, fevers, chills, chest pain, dyspnea, palpitations, abdominal pain, or changes in urinary or bowel habits.     Medications:     Home Medications:  Prior to Admission medications    Medication Sig Start Date End Date Taking? Authorizing Provider   aspirin (ECOTRIN) 81 MG EC tablet Take 1 tablet by mouth once daily. 3/14/25  Yes Provider, Historical   olopatadine (PATANOL) 0.1 % ophthalmic solution Apply to eye. 2/7/25  Yes Provider, Historical   sacubitriL-valsartan (ENTRESTO) 49-51 mg per tablet Take 1 tablet by mouth once daily. 2/12/25  Yes Provider, Historical   urea (CARMOL) 40 % Crea Apply topically. 2/13/25  Yes Provider, Historical   varenicline tartrate (CHANTIX) 1 mg Tab Take 0.5 mg by mouth. 2/13/25  Yes Provider, Historical   acetaminophen 325 mg Cap  Take 650 mg by mouth 2 (two) times daily as needed.    Provider, Historical   ALBUTEROL INHL Inhale 2 puffs into the lungs every 6 (six) hours as needed.    Provider, Historical   albuterol-ipratropium (DUO-NEB) 2.5 mg-0.5 mg/3 mL nebulizer solution Take 3 mLs by nebulization every 6 (six) hours as needed for Wheezing. Rescue 12/9/24   Diana Hassan MD   atorvastatin (LIPITOR) 80 MG tablet Take 1 tablet (80 mg total) by mouth once daily. 3/4/25 3/4/26  Marino Marquez DO   BREZTRI AEROSPHERE 160-9-4.8 mcg/actuation HFAA Inhale 2 puffs into the lungs 2 (two) times daily. 1/7/25   Provider, Historical   cetirizine (ZYRTEC) 10 MG tablet Take 1 tablet (10 mg total) by mouth once daily. 8/8/24 8/3/25  Abiodun Recinos DO   clopidogreL (PLAVIX) 75 mg tablet Take 1 tablet (75 mg total) by mouth once daily. 3/4/25   Marino Marquez DO   folic acid (FOLVITE) 1 MG tablet Take 1 tablet (1 mg total) by mouth once daily. 4/9/24 4/9/25  Tha Edmond MD   furosemide (LASIX) 40 MG tablet Take 1 tablet (40 mg total) by mouth 2 (two) times a day. 3/4/25   Marino Marquez DO   gabapentin (NEURONTIN) 300 MG capsule Take 300 mg by mouth 3 (three) times daily.    Provider, Historical   metoprolol succinate (TOPROL-XL) 25 MG 24 hr tablet Take 0.5 tablets (12.5 mg total) by mouth once daily. 3/4/25 3/4/26  Marino Marquez DO   nicotine (NICODERM CQ) 14 mg/24 hr Place 1 patch onto the skin daily as needed. 3/4/25   Marino Marquez DO   nicotine (NICODERM CQ) 21 mg/24 hr Place 1 patch onto the skin once daily. 3/20/25   Heaven Page MD   nitrofurantoin, macrocrystal-monohydrate, (MACROBID) 100 MG capsule Take 1 capsule (100 mg total) by mouth 2 (two) times daily. 3/4/25   Marino Marquez, DO   pantoprazole (PROTONIX) 40 MG tablet Take 1 tablet (40 mg total) by mouth 2 (two) times a day. 9/23/24   Abiodun Recinos,    potassium chloride SA (K-DUR,KLOR-CON) 20 MEQ tablet Take 1 tablet (20 mEq total) by mouth 2 (two) times daily. 1/28/25  1/28/26  Vernon Cifuentes MD   rivaroxaban (XARELTO) 20 mg Tab Take 1 tablet (20 mg total) by mouth Daily. 3/4/25   Marino Marquez DO   sertraline (ZOLOFT) 100 MG tablet Take 100 mg by mouth once daily.    Provider, Historical   vitamin D (VITAMIN D3) 1000 units Tab Take 1,000 Units by mouth once daily.    Provider, Historical       Current/Inpatient Medications:  Infusions:   0.9% NaCl   Intravenous Continuous   Stopped at 05/04/25 2254    DOBUTamine IV infusion (non-titrating)  2.5 mcg/kg/min Intravenous Continuous 3.4 mL/hr at 05/10/25 0527 2.5 mcg/kg/min at 05/10/25 0527     Scheduled:   atorvastatin  80 mg Oral Daily    clopidogreL  75 mg Oral Daily    folic acid  1 mg Oral Daily    hydrocortisone sodium succinate  50 mg Intravenous Q8H    ipratropium  0.5 mg Nebulization Q12H    levalbuterol  1.25 mg Nebulization Q12H    miconazole NITRATE 2 %   Topical (Top) BID    pantoprazole  40 mg Oral BID    QUEtiapine  100 mg Oral QHS    rivaroxaban  20 mg Oral Daily    spironolactone  25 mg Oral Daily    vitamin D  1,000 Units Oral Daily     PRN:    Current Facility-Administered Medications:     acetaminophen, 650 mg, Oral, Q4H PRN    aluminum-magnesium hydroxide, 30 mL, Oral, BID PRN    dextrose 50%, 12.5 g, Intravenous, PRN    dextrose 50%, 25 g, Intravenous, PRN    diphenhydrAMINE, 25 mg, Intravenous, Q6H PRN    glucagon (human recombinant), 1 mg, Intramuscular, PRN    glucose, 16 g, Oral, PRN    glucose, 24 g, Oral, PRN    guaiFENesin 100 mg/5 ml, 200 mg, Oral, Q4H PRN    hydrALAZINE, 10 mg, Intravenous, Q4H PRN    hydrocortisone, , Topical (Top), TID PRN    insulin aspart U-100, 0-5 Units, Subcutaneous, QID (AC + HS) PRN    lorazepam, 1 mg, Intravenous, Q6H PRN    melatonin, 6 mg, Oral, Nightly PRN    naloxone, 0.02 mg, Intravenous, PRN    nicotine, 1 patch, Transdermal, Daily PRN    polyethylene glycol, 17 g, Oral, Daily PRN    prochlorperazine, 5 mg, Intravenous, Q6H PRN    senna-docusate, 1 tablet, Oral, BID  PRN    simethicone, 1 tablet, Oral, TID PRN    sodium chloride 0.9%, 10 mL, Intravenous, PRN    traZODone, 25 mg, Oral, Nightly PRN    Objective:     24-hour Vitals:   Temp:  [97.7 °F (36.5 °C)-98.1 °F (36.7 °C)] 98.1 °F (36.7 °C)  Pulse:  [] 87  Resp:  [12-47] 18  SpO2:  [73 %-100 %] 98 %  BP: ()/(68-91) 98/75    Vitals: BP 98/75   Pulse 87   Temp 98.1 °F (36.7 °C)   Resp 18   Ht 6' (1.829 m)   Wt 89.1 kg (196 lb 6.9 oz)   SpO2 98%   BMI 26.64 kg/m²     Intake/Output Summary (Last 24 hours) at 5/12/2025 0708  Last data filed at 5/12/2025 0541  Gross per 24 hour   Intake --   Output 1765 ml   Net -1765 ml       Physical Exam  Vitals reviewed.   Constitutional:       General: He is not in acute distress.     Appearance: Normal appearance. He is normal weight. He is not ill-appearing.   HENT:      Head: Normocephalic and atraumatic.      Right Ear: External ear normal.      Left Ear: External ear normal.      Nose: Nose normal.   Eyes:      Conjunctiva/sclera: Conjunctivae normal.   Cardiovascular:      Rate and Rhythm: Normal rate. Rhythm irregular.      Pulses: Normal pulses.      Heart sounds: Murmur heard.   Pulmonary:      Effort: Pulmonary effort is normal. No respiratory distress.      Breath sounds: No stridor. Rales present. No wheezing or rhonchi.      Comments: 2L NC.   Chest:      Chest wall: No tenderness.   Abdominal:      General: Bowel sounds are normal. There is no distension.      Palpations: Abdomen is soft.   Musculoskeletal:      Cervical back: Neck supple.      Right lower leg: Edema present.      Left lower leg: Edema present.      Comments: Trace LE edema.    Skin:     General: Skin is warm and dry.      Capillary Refill: Capillary refill takes less than 2 seconds.   Neurological:      Mental Status: He is alert and oriented to person, place, and time.   Psychiatric:         Mood and Affect: Mood normal.         Behavior: Behavior normal.        Labs:   I have reviewed the  following labs below:    Recent Labs   Lab 05/06/25  1823 05/07/25  0013 05/07/25  0426 05/07/25  0813 05/08/25  0759 05/08/25  1233 05/10/25  0346 05/10/25  0805 05/11/25  0409 05/11/25  0814 05/11/25  1603 05/11/25  1948 05/12/25  0356   WBC  --   --  8.54   < >  --    < > 9.18  --  9.30  --   --   --  9.65   HGB  --   --  7.7*   < >  --    < > 7.5*  --  7.9*  --   --   --  8.0*   HCT  --   --  22.9*   < >  --    < > 22.6*  --  24.3*  --   --   --  24.7*   PLT  --   --  149   < >  --    < > 153  --  166  --   --   --  154   * 135* 135*   < > 132*   < > 129*   < > 132*   < > 135* 134* 134*   K 3.3* 3.5 2.7*   < > 2.7*   < > 2.6*   < > 3.1*   < > 3.6 4.0 3.5   CL 87* 86* 83*   < > 78*   < > 82*   < > 88*   < > 91* 91* 93*   CO2 30 30 35*   < > 38*   < > 33*   < > 30   < > 30 31 28   BUN 55.0* 56.3* 55.4*   < > 57.5*   < > 55.5*   < > 52.0*   < > 49.6* 50.2* 49.4*   CREATININE 1.65* 1.71* 1.64*   < > 1.50*   < > 1.27*   < > 1.15   < > 1.20 1.20 1.09   * 158* 162*   < > 142*   < > 161*   < > 151*   < > 133* 101 141*   CALCIUM 9.4 9.4 9.3   < > 9.3   < > 9.8   < > 10.3*   < > 10.4* 10.6* 10.3*   MG 2.20 2.20 2.10   < > 2.00   < > 2.20   < > 2.10   < > 2.00 2.10 2.00   PHOS 2.5 2.6 3.3   < > 3.8   < > 3.0   < > 3.3   < > 3.2 3.2 3.8   TROPONINI 0.106* 0.120* 0.095*  --   --   --   --   --   --   --   --   --   --    BNP  --   --   --   --  3,765.7*  --   --   --   --   --   --   --   --     < > = values in this interval not displayed.     Cardiovascular Studies/Imaging:   I have reviewed the following studies below:  TTE:   Summary  Show Result Comparison     Left Ventricle: The left ventricle is severely dilated. Severely increased ventricular mass. Mildly increased wall thickness. There is severe eccentric hypertrophy. Severe global hypokinesis present. There is severely reduced systolic function. Quantitated ejection fraction is 22%. Grade III diastolic dysfunction.    Right Ventricle: The right  ventricle has moderate enlargement. Systolic function is moderately reduced.    Left Atrium: Left atrium is severely dilated measuring 92ml/m2.    Right Atrium: Right atrium is severely dilated.    Aortic Valve: The aortic valve is a trileaflet valve. There is mild aortic valve sclerosis. There is no stenosis. There is no significant regurgitation.    Mitral Valve: The mitral valve is structurally normal. There is no stenosis. There is severe central regurgitation.    Tricuspid Valve: The tricuspid valve is structurally normal. There is moderate regurgitation.    Pulmonary Artery: There is moderate pulmonary hypertension. The estimated pulmonary artery systolic pressure is 59 mmHg.    IVC/SVC: Elevated venous pressure at 15 mmHg.    Pericardium: There is no pericardial effusion.    LHC/Cors: 3/26/2024  Coronary findings:     Dominance: right   Left main:  10-20% calcified distal left main disease  Left anterior descending artery:  50% calcified proximal stenosis  Circumflex artery:  Luminal irregularities  Right coronary artery:  Luminal irregularities    Imaging:   See imaging section for details.     Assessment:     Ran Reyes Jr. is a 69 y.o. male with NICM-HFrEF (LVEF 20-25%; G3DD), HTN, non obstructive CAD, AF on OAC, PAD, HLD, and COPD who presented with acute on chronic decompensated heart failure in cardiogenic shock now resolved.     Plan/Recommendations:   Acute on Chronic NICM-HFrEF with Resolved CS and Severe Mitral Regurgitation   -Wean off Dobutamine 2.5 mcg/kg/min. Reassess clinically and with LA 2 hours after discontinuation.    -Entresto 24-26 mg BID. Aldactone 25 mg daily. Hold loop diuretic for now.   -Continue to hold BB (weaning off inotropic support). No SGLT2i (has history of Claudine's).    -Close monitoring of electrolytes. Replace as clinically warranted.   -Advanced HF evaluation outpatient.   -Wean off supplemental oxygen.     Atrial Fibrillation  -Hold BB for now given above.    -Continue anticoagulation for CVA risk reduction.     Hypertension  -ARNi as above.      Non Obstructive Epicardial Coronary Artery Disease  Peripheral Artery Disease  -SAPT and high intensity statin therapy.     Vernon Cifuentes MD  [facility] Chief Cardiology Fellow, PGY-6  [facility]                [1]   Social History  Tobacco Use    Smoking status: Every Day     Current packs/day: 0.50     Average packs/day: 0.8 packs/day for 50.4 years (40.9 ttl pk-yrs)     Types: Cigarettes     Start date: 1975     Passive exposure: Current    Smokeless tobacco: Never    Tobacco comments:     States smoke 2-3 cigarettes per day   Substance Use Topics    Alcohol use: Yes     Alcohol/week: 3.0 standard drinks of alcohol     Types: 3 Cans of beer per week     Comment: socially    Drug use: Not Currently     Frequency: 1.0 times per week     Types: Marijuana     Comment: no use in over 1 year

## 2025-05-12 NOTE — PT/OT/SLP PROGRESS
Occupational Therapy   Treatment    Name: Ran Reyes Jr.  MRN: [id number]  Admitting Diagnosis:       1. ELIZABETH (acute kidney injury)    2. Weakness    3. Dyspnea    4. Screening due    5. HFrEF (heart failure with reduced ejection fraction)    6. Acidemia    7. Microcytic anemia    8. Tobacco dependency    9. Acute on chronic combined systolic and diastolic congestive heart failure    10. Bradycardia    11. QT prolongation    12. Heart failure with reduced ejection fraction    13. Heart failure    14. At risk for long QT syndrome    15. Chest pain    16. Acute combined systolic and diastolic congestive heart failure    17. Nonrheumatic mitral valve regurgitation    Problem List[1]   Recommendations:     Discharge Recommendations: Moderate Intensity Therapy  Discharge Equipment Recommendations:  bath bench  Barriers to discharge:  Decreased caregiver support    Assessment:     Ran Reyes Jr. is a 69 y.o. male with a medical diagnosis of see above.  He presents with poor tolerance of static standing and easily frustrated and agitated with instruction in energy conservation techniques during self care tasks . Performance deficits affecting function are weakness, impaired endurance, impaired self care skills, impaired functional mobility, gait instability, impaired balance, impaired cardiopulmonary response to activity.     Rehab Prognosis:  Good; patient would benefit from acute skilled OT services to address these deficits and reach maximum level of function.       Plan:     Patient to be seen 3 x/week to address the above listed problems via self-care/home management, therapeutic activities, therapeutic exercises, neuromuscular re-education  Plan of Care Expires: 04/28/25  Plan of Care Reviewed with: patient    Subjective     Chief Complaint: back pain and fatigue   Patient/Family Comments/goals: return home at [facility]  Pain/Comfort:  Location - Orientation 1: lower  Location 1: back  Pain Addressed 1: Nurse  notified  Pain Rating Post-Intervention 1: 4/10    Objective:     Communicated with: Nurse Rosa prior to session.  Patient found sitting edge of bed with blood pressure cuff, booth catheter, PICC line, pulse ox (continuous), telemetry, Other (comments) (life vest) upon OT entry to room.    General Precautions: Standard, fall, contact    Orthopedic Precautions:N/A  Braces: N/A  Respiratory Status: Room air     Occupational Performance:     Bed Mobility:    Pt seated EOB at start and end of session     Functional Mobility/Transfers:  Patient completed Sit <> Stand Transfer with stand by assistance  with  rollator    Functional Mobility: SBA ambulate ~ 6 feet bed >sink with rollator and  SBA ambulate 260 feet in hallway with rollator and with x 3 brief static standing rest breaks due to c/o back pain ; no LOB and steady pace    Activities of Daily Living:  Grooming: stand by assistance standing balance at sink 2 min while washing face and oral rinse; pt became agitated while standing and impulsively sat on rollator seat and with c/o low back pain ; pt refused other static standing attempts   Lower Body Dressing: SBA doff socks and moderate assistance don socks while seated EOB and with poor attempt, increased agitation and c/o SOB     Treatment & Education:  Pt. educated on OT goals, POC, orientation to environment, use of call bell for assist with transfers OOB or for any other needs due to fall risk.     Patient left sitting edge of bed with all lines intact, call button in reach, and nurse  notified    GOALS:   Multidisciplinary Problems       Occupational Therapy Goals          Problem: Occupational Therapy    Goal Priority Disciplines Outcome Interventions   Occupational Therapy Goal     OT, PT/OT Progressing    Description: Pt will ambulate to bathroom with rollator SBA  Pt will complete toilet t/f SBA  Pt will complete toileting tasks SBA  Pt will complete UE/LE dressing mod I  Pt will increase standing  endurance x 5 minutes.                        DME Justifications:  No DME recommended requiring DME justifications    Time Tracking:     OT Date of Treatment: 05/12/25  OT Start Time: 0828  OT Stop Time: 0900  OT Total Time (min): 32 min    Billable Minutes:Self Care/Home Management 16 min   Therapeutic Activity 16 min     OT/ALLAN: OT          5/12/2025       [1]   Patient Active Problem List  Diagnosis    Primary open angle glaucoma (POAG) of right eye, moderate stage    Combined forms of age-related cataract of left eye    Arteriosclerosis of coronary artery    Chronic atrial fibrillation    Dyslipidemia    Hypertension    Tobacco use    PVD (peripheral vascular disease)    VHD (valvular heart disease)    COVID-19    HFrEF (heart failure with reduced ejection fraction)    Nonrheumatic mitral valve regurgitation    Postoperative eye state    Scrotal hematoma    Chronic obstructive pulmonary disease, unspecified    Lesion of external ear    Low back pain    Other thrombophilia    Secondary hyperaldosteronism    Positive colorectal cancer screening using Cologuard test    Acute heart failure    History of COPD    CHF (congestive heart failure)    Acute cystitis with hematuria    Tobacco dependency    Moderate malnutrition

## 2025-05-13 LAB
ALBUMIN SERPL-MCNC: 3.1 G/DL (ref 3.4–4.8)
ALBUMIN/GLOB SERPL: 0.8 RATIO (ref 1.1–2)
ALP SERPL-CCNC: 246 UNIT/L (ref 40–150)
ALT SERPL-CCNC: 48 UNIT/L (ref 0–55)
ANION GAP SERPL CALC-SCNC: 10 MEQ/L
ANION GAP SERPL CALC-SCNC: 10 MEQ/L
ANION GAP SERPL CALC-SCNC: 12 MEQ/L
ANION GAP SERPL CALC-SCNC: 12 MEQ/L
ANION GAP SERPL CALC-SCNC: 9 MEQ/L
AST SERPL-CCNC: 49 UNIT/L (ref 11–45)
BASOPHILS # BLD AUTO: 0 X10(3)/MCL
BASOPHILS NFR BLD AUTO: 0 %
BILIRUB SERPL-MCNC: 4.6 MG/DL
BUN SERPL-MCNC: 42.3 MG/DL (ref 8.4–25.7)
BUN SERPL-MCNC: 42.8 MG/DL (ref 8.4–25.7)
BUN SERPL-MCNC: 43.6 MG/DL (ref 8.4–25.7)
BUN SERPL-MCNC: 44.5 MG/DL (ref 8.4–25.7)
BUN SERPL-MCNC: 48.6 MG/DL (ref 8.4–25.7)
CALCIUM SERPL-MCNC: 9.3 MG/DL (ref 8.8–10)
CALCIUM SERPL-MCNC: 9.5 MG/DL (ref 8.8–10)
CALCIUM SERPL-MCNC: 9.6 MG/DL (ref 8.8–10)
CHLORIDE SERPL-SCNC: 96 MMOL/L (ref 98–107)
CHLORIDE SERPL-SCNC: 97 MMOL/L (ref 98–107)
CHLORIDE SERPL-SCNC: 97 MMOL/L (ref 98–107)
CHLORIDE SERPL-SCNC: 99 MMOL/L (ref 98–107)
CHLORIDE SERPL-SCNC: 99 MMOL/L (ref 98–107)
CO2 SERPL-SCNC: 25 MMOL/L (ref 23–31)
CO2 SERPL-SCNC: 25 MMOL/L (ref 23–31)
CO2 SERPL-SCNC: 26 MMOL/L (ref 23–31)
CO2 SERPL-SCNC: 27 MMOL/L (ref 23–31)
CO2 SERPL-SCNC: 27 MMOL/L (ref 23–31)
CREAT SERPL-MCNC: 0.98 MG/DL (ref 0.72–1.25)
CREAT SERPL-MCNC: 1.06 MG/DL (ref 0.72–1.25)
CREAT SERPL-MCNC: 1.06 MG/DL (ref 0.72–1.25)
CREAT SERPL-MCNC: 1.13 MG/DL (ref 0.72–1.25)
CREAT SERPL-MCNC: 1.13 MG/DL (ref 0.72–1.25)
CREAT/UREA NIT SERPL: 38
CREAT/UREA NIT SERPL: 40
CREAT/UREA NIT SERPL: 42
CREAT/UREA NIT SERPL: 43
CREAT/UREA NIT SERPL: 44
EOSINOPHIL # BLD AUTO: 0.01 X10(3)/MCL (ref 0–0.9)
EOSINOPHIL NFR BLD AUTO: 0.1 %
ERYTHROCYTE [DISTWIDTH] IN BLOOD BY AUTOMATED COUNT: 26 % (ref 11.5–17)
GFR SERPLBLD CREATININE-BSD FMLA CKD-EPI: >60 ML/MIN/1.73/M2
GLOBULIN SER-MCNC: 4.1 GM/DL (ref 2.4–3.5)
GLUCOSE SERPL-MCNC: 118 MG/DL (ref 82–115)
GLUCOSE SERPL-MCNC: 135 MG/DL (ref 82–115)
GLUCOSE SERPL-MCNC: 160 MG/DL (ref 82–115)
GLUCOSE SERPL-MCNC: 162 MG/DL (ref 82–115)
GLUCOSE SERPL-MCNC: 183 MG/DL (ref 82–115)
HCT VFR BLD AUTO: 24.6 % (ref 42–52)
HGB BLD-MCNC: 7.8 G/DL (ref 14–18)
IMM GRANULOCYTES # BLD AUTO: 0.06 X10(3)/MCL (ref 0–0.04)
IMM GRANULOCYTES NFR BLD AUTO: 0.7 %
LACTATE SERPL-SCNC: 1.3 MMOL/L (ref 0.5–2.2)
LACTATE SERPL-SCNC: 1.3 MMOL/L (ref 0.5–2.2)
LYMPHOCYTES # BLD AUTO: 0.37 X10(3)/MCL (ref 0.6–4.6)
LYMPHOCYTES NFR BLD AUTO: 4.4 %
MAGNESIUM SERPL-MCNC: 1.8 MG/DL (ref 1.6–2.6)
MAGNESIUM SERPL-MCNC: 1.8 MG/DL (ref 1.6–2.6)
MAGNESIUM SERPL-MCNC: 1.9 MG/DL (ref 1.6–2.6)
MCH RBC QN AUTO: 24.5 PG (ref 27–31)
MCHC RBC AUTO-ENTMCNC: 31.7 G/DL (ref 33–36)
MCV RBC AUTO: 77.1 FL (ref 80–94)
MONOCYTES # BLD AUTO: 0.43 X10(3)/MCL (ref 0.1–1.3)
MONOCYTES NFR BLD AUTO: 5.1 %
NEUTROPHILS # BLD AUTO: 7.51 X10(3)/MCL (ref 2.1–9.2)
NEUTROPHILS NFR BLD AUTO: 89.7 %
NRBC BLD AUTO-RTO: 0 %
PHOSPHATE SERPL-MCNC: 3.3 MG/DL (ref 2.3–4.7)
PLATELET # BLD AUTO: 153 X10(3)/MCL (ref 130–400)
PLATELETS.RETICULATED NFR BLD AUTO: 4.3 % (ref 0.9–11.2)
PMV BLD AUTO: ABNORMAL FL
POCT GLUCOSE: 143 MG/DL (ref 70–110)
POCT GLUCOSE: 151 MG/DL (ref 70–110)
POCT GLUCOSE: 169 MG/DL (ref 70–110)
POCT GLUCOSE: 171 MG/DL (ref 70–110)
POCT GLUCOSE: 218 MG/DL (ref 70–110)
POTASSIUM SERPL-SCNC: 3.8 MMOL/L (ref 3.5–5.1)
POTASSIUM SERPL-SCNC: 3.9 MMOL/L (ref 3.5–5.1)
POTASSIUM SERPL-SCNC: 4.1 MMOL/L (ref 3.5–5.1)
POTASSIUM SERPL-SCNC: 4.3 MMOL/L (ref 3.5–5.1)
POTASSIUM SERPL-SCNC: 4.4 MMOL/L (ref 3.5–5.1)
PROT SERPL-MCNC: 7.2 GM/DL (ref 5.8–7.6)
RBC # BLD AUTO: 3.19 X10(6)/MCL (ref 4.7–6.1)
SODIUM SERPL-SCNC: 133 MMOL/L (ref 136–145)
SODIUM SERPL-SCNC: 133 MMOL/L (ref 136–145)
SODIUM SERPL-SCNC: 134 MMOL/L (ref 136–145)
SODIUM SERPL-SCNC: 135 MMOL/L (ref 136–145)
SODIUM SERPL-SCNC: 136 MMOL/L (ref 136–145)
TROPONIN I SERPL-MCNC: 0.02 NG/ML (ref 0–0.04)
WBC # BLD AUTO: 8.38 X10(3)/MCL (ref 4.5–11.5)

## 2025-05-13 PROCEDURE — 94640 AIRWAY INHALATION TREATMENT: CPT

## 2025-05-13 PROCEDURE — 83605 ASSAY OF LACTIC ACID: CPT

## 2025-05-13 PROCEDURE — 36415 COLL VENOUS BLD VENIPUNCTURE: CPT

## 2025-05-13 PROCEDURE — 27000221 HC OXYGEN, UP TO 24 HOURS

## 2025-05-13 PROCEDURE — 27000207 HC ISOLATION

## 2025-05-13 PROCEDURE — 99232 SBSQ HOSP IP/OBS MODERATE 35: CPT | Mod: GC,,, | Performed by: INTERNAL MEDICINE

## 2025-05-13 PROCEDURE — 83735 ASSAY OF MAGNESIUM: CPT

## 2025-05-13 PROCEDURE — 97530 THERAPEUTIC ACTIVITIES: CPT

## 2025-05-13 PROCEDURE — 63600175 PHARM REV CODE 636 W HCPCS

## 2025-05-13 PROCEDURE — 84100 ASSAY OF PHOSPHORUS: CPT

## 2025-05-13 PROCEDURE — 25000003 PHARM REV CODE 250

## 2025-05-13 PROCEDURE — 84484 ASSAY OF TROPONIN QUANT: CPT

## 2025-05-13 PROCEDURE — 94761 N-INVAS EAR/PLS OXIMETRY MLT: CPT

## 2025-05-13 PROCEDURE — 85025 COMPLETE CBC W/AUTO DIFF WBC: CPT

## 2025-05-13 PROCEDURE — 97116 GAIT TRAINING THERAPY: CPT

## 2025-05-13 PROCEDURE — 25000242 PHARM REV CODE 250 ALT 637 W/ HCPCS

## 2025-05-13 PROCEDURE — 80053 COMPREHEN METABOLIC PANEL: CPT

## 2025-05-13 PROCEDURE — 20000000 HC ICU ROOM

## 2025-05-13 RX ADMIN — MICONAZOLE NITRATE: 20 POWDER TOPICAL at 09:05

## 2025-05-13 RX ADMIN — SODIUM CHLORIDE 250 ML: 0.9 INJECTION, SOLUTION INTRAVENOUS at 12:05

## 2025-05-13 RX ADMIN — CLOPIDOGREL BISULFATE 75 MG: 75 TABLET, FILM COATED ORAL at 09:05

## 2025-05-13 RX ADMIN — SACUBITRIL AND VALSARTAN 1 TABLET: 24; 26 TABLET, FILM COATED ORAL at 09:05

## 2025-05-13 RX ADMIN — RIVAROXABAN 20 MG: 10 TABLET, FILM COATED ORAL at 06:05

## 2025-05-13 RX ADMIN — PANTOPRAZOLE SODIUM 40 MG: 40 TABLET, DELAYED RELEASE ORAL at 09:05

## 2025-05-13 RX ADMIN — IPRATROPIUM BROMIDE 0.5 MG: 0.5 SOLUTION RESPIRATORY (INHALATION) at 07:05

## 2025-05-13 RX ADMIN — HYDROCORTISONE SODIUM SUCCINATE 50 MG: 100 INJECTION, POWDER, FOR SOLUTION INTRAMUSCULAR; INTRAVENOUS at 05:05

## 2025-05-13 RX ADMIN — QUETIAPINE FUMARATE 100 MG: 100 TABLET ORAL at 09:05

## 2025-05-13 RX ADMIN — LEVALBUTEROL HYDROCHLORIDE 1.25 MG: 1.25 SOLUTION RESPIRATORY (INHALATION) at 07:05

## 2025-05-13 RX ADMIN — INSULIN ASPART 2 UNITS: 100 INJECTION, SOLUTION INTRAVENOUS; SUBCUTANEOUS at 12:05

## 2025-05-13 RX ADMIN — Medication 1000 UNITS: at 09:05

## 2025-05-13 RX ADMIN — HYDROCORTISONE SODIUM SUCCINATE 50 MG: 100 INJECTION, POWDER, FOR SOLUTION INTRAMUSCULAR; INTRAVENOUS at 02:05

## 2025-05-13 RX ADMIN — FOLIC ACID 1 MG: 1 TABLET ORAL at 09:05

## 2025-05-13 RX ADMIN — HYDROCORTISONE SODIUM SUCCINATE 50 MG: 100 INJECTION, POWDER, FOR SOLUTION INTRAMUSCULAR; INTRAVENOUS at 09:05

## 2025-05-13 RX ADMIN — ATORVASTATIN CALCIUM 80 MG: 40 TABLET, FILM COATED ORAL at 09:05

## 2025-05-13 NOTE — PROGRESS NOTES
Order for Level II Screening called in to Giana with [facility] ([phone]) for SNF placement.

## 2025-05-13 NOTE — PLAN OF CARE
Problem: Skin Injury Risk Increased  Goal: Skin Health and Integrity  Outcome: Progressing     Problem: Adult Inpatient Plan of Care  Goal: Plan of Care Review  Outcome: Progressing  Goal: Patient-Specific Goal (Individualized)  Outcome: Progressing  Goal: Absence of Hospital-Acquired Illness or Injury  Outcome: Progressing  Goal: Optimal Comfort and Wellbeing  Outcome: Progressing  Goal: Readiness for Transition of Care  Outcome: Progressing     Problem: COPD (Chronic Obstructive Pulmonary Disease)  Goal: Optimal Chronic Illness Coping  Outcome: Progressing  Goal: Optimal Level of Functional [place]  Outcome: Progressing  Goal: Absence of Infection Signs and Symptoms  Outcome: Progressing  Goal: Improved Oral Intake  Outcome: Progressing     Problem: Heart Failure  Goal: Optimal Coping  Outcome: Progressing  Goal: Optimal Cardiac Output  Outcome: Progressing  Goal: Stable Heart Rate and Rhythm  Outcome: Progressing  Goal: Optimal Functional Ability  Outcome: Progressing  Goal: Fluid and Electrolyte Balance  Outcome: Progressing  Goal: Improved Oral Intake  Outcome: Progressing     Problem: Fall Injury Risk  Goal: Absence of Fall and Fall-Related Injury  Outcome: Progressing     Problem: Wound  Goal: Optimal Coping  Outcome: Progressing  Goal: Optimal Functional Ability  Outcome: Progressing  Goal: Absence of Infection Signs and Symptoms  Outcome: Progressing  Goal: Improved Oral Intake  Outcome: Progressing  Goal: Optimal Pain Control and Function  Outcome: Progressing  Goal: Skin Health and Integrity  Outcome: Progressing  Goal: Optimal Wound Healing  Outcome: Progressing     Problem: Acute Kidney Injury/Impairment  Goal: Fluid and Electrolyte Balance  Outcome: Progressing  Goal: Improved Oral Intake  Outcome: Progressing  Goal: Effective Renal Function  Outcome: Progressing     Problem: Dysrhythmia  Goal: Normalized Cardiac Rhythm  Outcome: Progressing     Problem: Infection  Goal: Absence of Infection  Signs and Symptoms  Outcome: Progressing

## 2025-05-13 NOTE — PT/OT/SLP PROGRESS
Physical Therapy Treatment    Patient Name:  Ran Reyes Jr.   MRN:  [id number]    Recommendations     Therapy Intensity Recommendations at Discharge: Moderate Intensity Therapy  Discharge Equipment Recommendations: bath bench   Barriers to discharge: fall risk, severity of deficits, level of skilled assistance required, decreased endurance, decreased safety awareness, and complicated medical history    Assessment     Ran Reyes Jr. is a 69 y.o. male admitted with a medical diagnosis of:  1. ELIZABETH (acute kidney injury)    2. Weakness    3. Dyspnea    4. Screening due    5. HFrEF (heart failure with reduced ejection fraction)    6. Acidemia    7. Microcytic anemia    8. Tobacco dependency    9. Acute on chronic combined systolic and diastolic congestive heart failure    10. Bradycardia    11. QT prolongation    12. Heart failure with reduced ejection fraction    13. Heart failure    14. At risk for long QT syndrome    15. Chest pain    16. Acute combined systolic and diastolic congestive heart failure    17. Nonrheumatic mitral valve regurgitation    18. Syncope       Problem List[1]   He presents with the following impairments/functional limitations:  weakness, impaired endurance, impaired self care skills, impaired functional mobility, gait instability, impaired balance, impaired cardiopulmonary response to activity, decreased safety awareness.    Rehab Prognosis: Fair.    Patient would benefit from continued skilled acute PT services to: address above listed impairments/functional limitations; receive patient/caregiver education; reduce fall risk; and maximize independency/safety with functional mobility.    Recent Surgery: * No surgery found *      Plan     During this hospitalization, patient to be seen 5 x/week to address the identified impairments/functional limitations via gait training, therapeutic activities, therapeutic exercises and progress toward the established goals.    Plan of Care Expires:    "(Discharge)    Subjective     Communicated with patient's nurse (Moe) prior to session.    Patient agreeable to participate in treatment session.    Chief Complaint: "I need my wallet."  Patient/Family Comments/goals: None stated.  Pain/Comfort:  Pain Rating 1: 0/10  Pain Rating Post-Intervention 1: 0/10    Objective     Patient found supine in bed and with HOB elevated with blood pressure cuff, telemetry, pulse ox (continuous), peripheral IV, booth catheter  upon PT entry to room.    General Precautions: Standard, contact, fall   Orthopedic Precautions:N/A   Braces:  N/A  Respiratory Status: room air    Functional Mobility:    Bed Mobility:  Supine to Sit: minimum assistance  Sit to Supine: minimum assistance    Transfers:  Sit to Stand: stand by assistance with rolling walker  Stand to Sit: stand by assistance with rolling walker    Gait:  Patient ambulated 130ft with rolling walker and contact guard assistance.  Patient demonstrates :       steady gait       occasional unsteady gait       decreased susie.  Loss of balance initially, however, was able to self recover    Other Mobility:  N/A    Patient left supine in bed, with HOB elevated, and with bed rails up bilateral HOB, left FOB, and right FOB with all lines intact, call button in reach, tray table at bedside, and patient's nurse notified.    DME Justifications:  No DME recommended requiring DME justifications    Education     Patient educated on and assisted with functional mobility as noted above.  Patient educated on PT Plan of Care.  Patient was instructed to utilize staff assistance for mobility/transfers.  White board updated regarding patient's safest level of mobility with staff assistance    Goals     Multidisciplinary Problems       Physical Therapy Goals          Problem: Physical Therapy    Goal Priority Disciplines Outcome Interventions   Physical Therapy Goal     PT, PT/OT Progressing    Description: REVIEWED 05/10/2025  Goals to be met by: " DISCHARGE  Patient will increase functional independence with mobility by performin. Sit <=> stand w/ RW at Elizabeth. - ONGOING  2. Bed <=> chair w/ RW at Elizabeth. - ONGOING  3. Ambulate 130' w/ RW at Elizabeth. - ONGOING                     Time Tracking     PT Received On: 25  PT Start Time: 1119     PT Stop Time: 1146  PT Total Time (min): 27 min     Billable Minutes: Gait Training 12 minutes and Therapeutic Activity 15 minutes    2025       [1]   Patient Active Problem List  Diagnosis    Primary open angle glaucoma (POAG) of right eye, moderate stage    Combined forms of age-related cataract of left eye    Arteriosclerosis of coronary artery    Chronic atrial fibrillation    Dyslipidemia    Hypertension    Tobacco use    PVD (peripheral vascular disease)    VHD (valvular heart disease)    COVID-19    HFrEF (heart failure with reduced ejection fraction)    Nonrheumatic mitral valve regurgitation    Postoperative eye state    Scrotal hematoma    Chronic obstructive pulmonary disease, unspecified    Lesion of external ear    Low back pain    Other thrombophilia    Secondary hyperaldosteronism    Positive colorectal cancer screening using Cologuard test    Acute heart failure    History of COPD    CHF (congestive heart failure)    Acute cystitis with hematuria    Tobacco dependency    Moderate malnutrition

## 2025-05-13 NOTE — PROGRESS NOTES
[facility] Surg Telemetry  Pulmonary Critical Care Note    Patient Name: Ran Reyes Jr.  MRN: [id number]  Admission Date: 4/21/2025  Hospital Length of Stay: 21 days  Code Status: Full Code  Attending Provider: Osvaldo Lynne MD  Primary Care Provider: Abiodun Recinos DO     Subjective:     HPI:   Ran Reyes Jr. is a 69 y.o. 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 Patient reports he can only ambulate about 20-30 feet before having to stop due to claudication pain which is chronic and unchanged compared to baseline. Since running out of his diuretic medications approximately 5 days ago he has noted progressively worsening lower extremity swelling causing leg heaviness and weakness exacerbating difficulty ambulating.  He also notes acute on chronic cough productive of a brownish sputum without hemoptysis.  Denies fever, chills, sweats.  Denies chest pain.  Endorses shortness of breath at rest and with ambulation but unchanged compared to baseline.  Denies abdominal pain, nausea, vomiting, constipation, diarrhea.  Denies increased or decreased urinary frequency, dysuria, or hematuria.  Sleeps with 2 pillows at night due to baseline orthopnea. Has not had to increase number of pillows or change sleeping habits. Wears 2L NC home oxygen at baseline for hx of COPD. Denies having increased oxygen requirements prior to ED presentation.     ED course:  Vital signs stable.  Oxygenation  stable on 2 L nasal cannula.  CBC shows microcytic anemic (HGB 9.0; MCV 77).  CMP shows hyponatremia (Na 128), acidemia (CO2 14), elevated renal indices (BUN 25; creatinine 1.93), elevated alkaline phosphatase (). Troponin WNL.  BNP 26 39.5.  UDS negative.  EKG showed atrial fibrillation with PVCs.  Chest x-ray difficult to interpret due to overlying medical devices however appears to show cardiomegaly with bilateral pulmonary vascular congestion with question of bilateral pleural effusions. He was admitted for acute CHF exacerbation and cardiorenal syndrome.      Hospital Course/Significant events:  4/23/25: Admit to ICU, started on pressors and inotropes for cardiogenic / septic shock   5/2/25: Downgraded to    5/3/25: Re-upgraded to ICU 2/2 worsening lactate and agonal breathing  5/13/25: Weaned off dobutamine    24 Hour Interval History:  Patient monitored overnight in ICU due to waxing and waning cardiac status with extreme difficulty weaning off inotropes and high risk for decompensation. Per night team and nursing staff patient suffered an episode of bradycardia with HR into the 30s followed by syncopal episode. Work up for causes unremarkable. CT head without contrast negative for acute intracranial abnormality. Review of vitals show patient low normotensive but BP close to goal given patient EF. Remaining vitals stable. He remains off dobutamine gtt at this time. Denies any acute complaints today. CBC showed stable microcytic anemia.  CMP showed mild hyponatremia and mild hyperchloremia.    Past Medical History:   Diagnosis Date    A-fib     Anticoagulant long-term use     Anxiety     Aortic aneurysm     Cataract     CHF (congestive heart failure)     Chronic atrial fibrillation     COPD (chronic obstructive pulmonary disease)     Coronary artery disease     Depression     HLD (hyperlipidemia)     Hypertension     Mitral regurgitation     PAD (peripheral artery disease)     Primary open angle  glaucoma (POAG)        Past Surgical History:   Procedure Laterality Date    ANGIOGRAM, CORONARY, WITH LEFT HEART CATHETERIZATION N/A 03/26/2024    Procedure: Angiogram, Coronary, with Left Heart Cath;  Surgeon: Adriano Montiel MD;  Location: [facility] CATH LAB;  Service: Cardiology;  Laterality: N/A;    ATHERECTOMY OF PERIPHERAL VESSEL Left 09/12/2022    LEFT SFA ATHERECTOMY, BALLOON ANGIOPLASTY    CATARACT EXTRACTION W/  INTRAOCULAR LENS IMPLANT Left 03/09/2023    Procedure: EXTRACTION, CATARACT, WITH IOL INSERTION;  Surgeon: Georgi Borja MD;  Location: [facility] OR;  Service: Ophthalmology;  Laterality: Left;  19.5  mac    EGD, WITH CLOSED BIOPSY  03/22/2024    Procedure: EGD, WITH CLOSED BIOPSY;  Surgeon: Ronald Calderon MD;  Location: [facility] ENDOSCOPY;  Service: Gastroenterology;;    ESOPHAGOGASTRODUODENOSCOPY N/A 03/22/2024    Procedure: EGD;  Surgeon: Ronald Calderon MD;  Location: [facility] ENDOSCOPY;  Service: Gastroenterology;  Laterality: N/A;    Heart Stent N/A     > 10yrs. AGO    INCISION AND DRAINAGE N/A 03/18/2024    Procedure: Incision and Drainage;  Surgeon: Fahad Rivsa MD;  Location: [facility];  Service: Urology;  Laterality: N/A;  I&D SCROTAL ABSCESS    INSERTION OF STENT INTO PERIPHERAL VESSEL Right 10/17/2022    RIGHT SFA ATHERECTOMY, BALLOON ANGIOPLASTY, STENT; RIGHT ANTERIOR TIBIAL ARTERY ATHERECTOMY, BALLOON ANGIOPLASTY    ORCHIECTOMY Left 03/18/2024    Procedure: ORCHIECTOMY;  Surgeon: Fahad Rivas MD;  Location: [facility];  Service: Urology;  Laterality: Left;       Social History[1]    Current Outpatient Medications   Medication Instructions    acetaminophen 650 mg, 2 times daily PRN    ALBUTEROL INHL 2 puffs, Every 6 hours PRN    albuterol-ipratropium (DUO-NEB) 2.5 mg-0.5 mg/3 mL nebulizer solution 3 mLs, Nebulization, Every 6 hours PRN, Rescue    aspirin (ECOTRIN) 81 MG EC tablet 1 tablet, Daily    atorvastatin (LIPITOR) 80 mg, Oral, Daily    BREZTRI AEROSPHERE  160-9-4.8 mcg/actuation HFAA 2 puffs, 2 times daily    cetirizine (ZYRTEC) 10 mg, Oral, Daily    clopidogreL (PLAVIX) 75 mg, Oral, Daily    folic acid (FOLVITE) 1 mg, Oral, Daily    furosemide (LASIX) 40 mg, Oral, 2 times daily    gabapentin (NEURONTIN) 300 mg, Oral, 3 times daily    metoprolol succinate (TOPROL-XL) 12.5 mg, Oral, Daily    nicotine (NICODERM CQ) 14 mg/24 hr 1 patch, Transdermal, Daily PRN    nicotine (NICODERM CQ) 21 mg/24 hr 1 patch, Transdermal, Daily    nitrofurantoin, macrocrystal-monohydrate, (MACROBID) 100 MG capsule 100 mg, Oral, 2 times daily    olopatadine (PATANOL) 0.1 % ophthalmic solution Apply to eye.    pantoprazole (PROTONIX) 40 mg, Oral, 2 times daily    potassium chloride SA (K-DUR,KLOR-CON) 20 MEQ tablet 20 mEq, Oral, 2 times daily    rivaroxaban (XARELTO) 20 mg, Oral, Daily    sacubitriL-valsartan (ENTRESTO) 49-51 mg per tablet 1 tablet, Daily    sertraline (ZOLOFT) 100 mg, Oral, Daily    urea (CARMOL) 40 % Crea Apply topically.    varenicline tartrate (CHANTIX) 0.5 mg    vitamin D (VITAMIN D3) 1,000 Units, Daily     Current Inpatient Medications   atorvastatin  80 mg Oral Daily    clopidogreL  75 mg Oral Daily    ferrous sulfate  1 tablet Oral Q48H    folic acid  1 mg Oral Daily    hydrocortisone sodium succinate  50 mg Intravenous Q8H    ipratropium  0.5 mg Nebulization Q12H    levalbuterol  1.25 mg Nebulization Q12H    miconazole NITRATE 2 %   Topical (Top) BID    pantoprazole  40 mg Oral BID    QUEtiapine  100 mg Oral QHS    rivaroxaban  20 mg Oral Daily    sacubitriL-valsartan  1 tablet Oral BID    spironolactone  25 mg Oral Daily    vitamin D  1,000 Units Oral Daily     Current Intravenous Infusions   0.9% NaCl   Intravenous Continuous   Stopped at 05/04/25 5183     ROS: neg unless stated above        Objective:     Intake/Output Summary (Last 24 hours) at 5/13/2025 1151  Last data filed at 5/13/2025 0615  Gross per 24 hour   Intake 747.38 ml   Output 1450 ml   Net -702.62  ml     Vital Signs (Most Recent):  Temp: 98.1 °F (36.7 °C) (05/13/25 0359)  Pulse: 75 (05/13/25 0800)  Resp: 10 (05/13/25 0800)  BP: (!) 84/59 (05/13/25 0900)  SpO2: 100 % (05/13/25 0800)  Body mass index is 25.41 kg/m².  Weight: 85 kg (187 lb 6.3 oz) Vital Signs (24h Range):  Temp:  [98 °F (36.7 °C)-98.4 °F (36.9 °C)] 98.1 °F (36.7 °C)  Pulse:  [] 75  Resp:  [10-31] 10  SpO2:  [90 %-100 %] 100 %  BP: ()/(56-81) 84/59     Physical Exam:  Physical Exam  Constitutional:       General: He is not in acute distress.     Appearance: Normal appearance. He is not ill-appearing.   HENT:      Head: Normocephalic and atraumatic.   Eyes:      General: No scleral icterus.        Right eye: No discharge.         Left eye: No discharge.      Extraocular Movements: Extraocular movements intact.      Conjunctiva/sclera: Conjunctivae normal.      Pupils: Pupils are equal, round, and reactive to light.   Cardiovascular:      Rate and Rhythm: Regular rhythm. Tachycardia present.      Pulses: Normal pulses.      Heart sounds: Murmur heard.      No friction rub. No gallop.   Pulmonary:      Effort: Pulmonary effort is normal. No respiratory distress.      Breath sounds: Normal breath sounds. No stridor. No wheezing, rhonchi or rales.   Abdominal:      General: Abdomen is flat. Bowel sounds are normal. There is no distension.      Palpations: Abdomen is soft.      Tenderness: There is no abdominal tenderness. There is no guarding.   Musculoskeletal:         General: Swelling present. Normal range of motion.      Right lower leg: Edema present.      Left lower leg: Edema present.      Comments: 1+ pitting edema to BLE   Skin:     General: Skin is warm and dry.   Neurological:      General: No focal deficit present.      Mental Status: He is alert and oriented to person, place, and time.           Lines/Drains/Airways       Peripherally Inserted Central Catheter Line  Duration             PICC Triple Lumen 04/24/25 0942 right  brachial 19 days              Drain  Duration                  Urethral Catheter 05/05/25 0808 Silicone 16 Fr. 8 days                  Significant Labs:  Lab Results   Component Value Date    WBC 8.38 05/13/2025    HGB 7.8 (L) 05/13/2025    HCT 24.6 (L) 05/13/2025    MCV 77.1 (L) 05/13/2025     05/13/2025       BMP  Lab Results   Component Value Date     (L) 05/13/2025    K 4.3 05/13/2025    CO2 25 05/13/2025    BUN 42.3 (H) 05/13/2025    CREATININE 1.06 05/13/2025    CALCIUM 9.5 05/13/2025    AGAP 12.0 05/13/2025    EGFRNONAA 88 (L) 12/06/2021     ABG  Recent Labs   Lab 05/08/25  1341   PH 7.610*   PO2 72.0*   PCO2 47.0*   HCO3 47.2*     Mechanical Ventilation Support:  Oxygen Concentration (%): 28 (05/12/25 0044)    Significant Imaging:  I have reviewed the pertinent imaging within the past 24 hours.    Assessment/Plan:     Assessment  Cardiogenic Shock   HFmrEF with pulmonary hypertension  TTE showed 22% with PASP 59 mm Hg and grade 3 diastolic dysfunction  NOCAD, NICMO  NSTEMI likely demand  Chronic persistent Afib  Hx of VT arrest (has life vest)  Cardiorenal syndrome (improved)   Adrenal Insufficiency   Low cortisol on ata stim test and normal plast ACTH level  MRI brain to be done inpatient vs outpatient  COPD   Microcytic Anemia   Low TSAT 7%  Prolonged QTC  Groin itch  Anxiety/Insomnia  Hypokalemia     Plan  Continue ongoing ICU level of care  Cardiology following; appreciate assistance  Weaned off dobutamine yesterday and remains off dobutamine today  Holding entresto 24-26 mg b.i.d. and spironolactone 25 mg q.d. due to BP below goal this a.m.  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  Continue to hold IV Bumex and oral metolazone  Maintain K>4, Phos > 3 and Mg > 2  Long term plan is patient will need outpatient referral to advanced heart specialists in [place]  for evaluation for LVAD versus heart transplant  Discussed with cardiology and will set MAP > 60 as new MAP goal given patient severely reduced EF unless patient becomes symptomatic from hypotension or develops end organ damage  No indication for levophed at this time but if patient BP is persistently below goal or patient becomes symptomatic then can start levophed for bp support  Continue antiplatelet monotherapy with plavix   Strict I&O and daily weights  Continue IV Hydrocortisone 50 mg q8h due to persistent hypotension previously  LDSSI   Continue Miconazole powder BID   Continue remaining medications:  Levalbuterol and ipratropium q.12 hours, Atorvastatin 80 mg, quetiapine 100 mg q.h.s., q.d., ferrous sulfate 325 mg q.48h, folic acid 1 mg a day    DVT Prophylaxis: Xarelto 20 mg  GI Prophylaxis: PPI     32 minutes of critical care was time spent personally by me on the following activities: development of treatment plan with patient or surrogate and bedside caregivers, discussions with consultants, evaluation of patient's response to treatment, examination of patient, ordering and performing treatments and interventions, ordering and review of laboratory studies, ordering and review of radiographic studies, pulse oximetry, re-evaluation of patient's condition.  This critical care time did not overlap with that of any other provider or involve time for any procedures.     Sagar Napoles MD  [facility] Internal Medicine -II  Pulmonary Critical Care Medicine  [facility] Surg Telemetry       [1]   Social History  Socioeconomic History    Marital status: Single   Tobacco Use    Smoking status: Every Day     Current packs/day: 0.50     Average packs/day: 0.8 packs/day for 50.4 years (40.9 ttl pk-yrs)     Types: Cigarettes     Start date: 1975     Passive exposure: Current    Smokeless tobacco: Never    Tobacco comments:     States smoke 2-3 cigarettes per day   Substance and Sexual Activity     Alcohol use: Yes     Alcohol/week: 3.0 standard drinks of alcohol     Types: 3 Cans of beer per week     Comment: socially    Drug use: Not Currently     Frequency: 1.0 times per week     Types: Marijuana     Comment: no use in over 1 year    Sexual activity: Not Currently     Partners: Female     Social Drivers of Health     Financial Resource Strain: Low Risk  (4/23/2025)    Overall Financial Resource Strain (CARDIA)     Difficulty of Paying Living Expenses: Not hard at all   Food Insecurity: No Food Insecurity (4/23/2025)    Hunger Vital Sign     Worried About Running Out of Food in the Last Year: Never true     Ran Out of Food in the Last Year: Never true   Transportation Needs: No Transportation Needs (4/23/2025)    PRAPARE - Transportation     Lack of Transportation (Medical): No     Lack of Transportation (Non-Medical): No   Physical Activity: Inactive (12/17/2024)    Exercise Vital Sign     Days of Exercise per Week: 0 days     Minutes of Exercise per Session: 0 min   Stress: No Stress Concern Present (4/23/2025)    [employer] - Occupational Stress Questionnaire     Feeling of Stress : Not at all   Housing Stability: Low Risk  (4/23/2025)    Housing Stability Vital Sign     Unable to Pay for Housing in the Last Year: No     Homeless in the Last Year: No

## 2025-05-13 NOTE — PROGRESS NOTES
Cardiology Progress Note     Cardiology Attending: Dayanna Johnson MD  Cardiology Fellow: Vernon Cifuentes MD     Date of Admit: 4/21/2025    History of Present Illness:      Ran Reyes Jr. is a 69 y.o. male with NICM-HFrEF (LVEF 20-25%; G3DD), HTN, non obstructive CAD, AF on OAC, PAD, HLD, and COPD who presented with acute on chronic decompensated heart failure in cardiogenic shock. Cardiogenic shock is now resolved.     Reported syncopal event last night with bradycardic episode. Telemetry reviewed around  0100 hours when event reportedly occurred and identified time period with AF but no bradycardic event seen.     Patient without acute complain this morning. Has low normal blood pressure but he denies related symptoms. LA obtained without evidence of end organ dysfunction.       Past Medical History:     Past Medical History:   Diagnosis Date    A-fib     Anticoagulant long-term use     Anxiety     Aortic aneurysm     Cataract     CHF (congestive heart failure)     Chronic atrial fibrillation     COPD (chronic obstructive pulmonary disease)     Coronary artery disease     Depression     HLD (hyperlipidemia)     Hypertension     Mitral regurgitation     PAD (peripheral artery disease)     Primary open angle glaucoma (POAG)      Surgical History:     Past Surgical History:   Procedure Laterality Date    ANGIOGRAM, CORONARY, WITH LEFT HEART CATHETERIZATION N/A 03/26/2024    Procedure: Angiogram, Coronary, with Left Heart Cath;  Surgeon: Adriano Montiel MD;  Location: [facility] CATH LAB;  Service: Cardiology;  Laterality: N/A;    ATHERECTOMY OF PERIPHERAL VESSEL Left 09/12/2022    LEFT SFA ATHERECTOMY, BALLOON ANGIOPLASTY    CATARACT EXTRACTION W/  INTRAOCULAR LENS IMPLANT Left 03/09/2023    Procedure: EXTRACTION, CATARACT, WITH IOL INSERTION;  Surgeon: Georgi Borja MD;  Location: [facility];  Service: Ophthalmology;  Laterality: Left;  19.5  mac    EGD, WITH CLOSED BIOPSY  03/22/2024    Procedure: EGD, WITH CLOSED  BIOPSY;  Surgeon: Ronald Calderon MD;  Location: [facility] ENDOSCOPY;  Service: Gastroenterology;;    ESOPHAGOGASTRODUODENOSCOPY N/A 03/22/2024    Procedure: EGD;  Surgeon: Ronald Calderon MD;  Location: [facility] ENDOSCOPY;  Service: Gastroenterology;  Laterality: N/A;    Heart Stent N/A     > 10yrs. AGO    INCISION AND DRAINAGE N/A 03/18/2024    Procedure: Incision and Drainage;  Surgeon: Fahad Rivas MD;  Location: [facility] OR;  Service: Urology;  Laterality: N/A;  I&D SCROTAL ABSCESS    INSERTION OF STENT INTO PERIPHERAL VESSEL Right 10/17/2022    RIGHT SFA ATHERECTOMY, BALLOON ANGIOPLASTY, STENT; RIGHT ANTERIOR TIBIAL ARTERY ATHERECTOMY, BALLOON ANGIOPLASTY    ORCHIECTOMY Left 03/18/2024    Procedure: ORCHIECTOMY;  Surgeon: Fahad Rivas MD;  Location: [facility];  Service: Urology;  Laterality: Left;     Allergies:   Review of patient's allergies indicates:  No Known Allergies  Family History:     Family History   Problem Relation Name Age of Onset    Cancer Mother      Hypertension Mother      Heart failure Father      Stroke Father      Hypertension Father      No Known Problems Sister       Social History:   Social History[1]  Review of Systems:     The patient denies headaches, changes in vision, nausea, emesis, fevers, chills, chest pain, dyspnea, palpitations, abdominal pain, or changes in urinary or bowel habits.     Medications:     Home Medications:  Prior to Admission medications    Medication Sig Start Date End Date Taking? Authorizing Provider   aspirin (ECOTRIN) 81 MG EC tablet Take 1 tablet by mouth once daily. 3/14/25  Yes Provider, Historical   olopatadine (PATANOL) 0.1 % ophthalmic solution Apply to eye. 2/7/25  Yes Provider, Historical   sacubitriL-valsartan (ENTRESTO) 49-51 mg per tablet Take 1 tablet by mouth once daily. 2/12/25  Yes Provider, Historical   urea (CARMOL) 40 % Crea Apply topically. 2/13/25  Yes Provider, Historical   varenicline tartrate (CHANTIX) 1 mg  Tab Take 0.5 mg by mouth. 2/13/25  Yes Provider, Historical   acetaminophen 325 mg Cap Take 650 mg by mouth 2 (two) times daily as needed.    Provider, Historical   ALBUTEROL INHL Inhale 2 puffs into the lungs every 6 (six) hours as needed.    Provider, Historical   albuterol-ipratropium (DUO-NEB) 2.5 mg-0.5 mg/3 mL nebulizer solution Take 3 mLs by nebulization every 6 (six) hours as needed for Wheezing. Rescue 12/9/24   Diana Hassan MD   atorvastatin (LIPITOR) 80 MG tablet Take 1 tablet (80 mg total) by mouth once daily. 3/4/25 3/4/26  Marino Marquez DO   BREZTRI AEROSPHERE 160-9-4.8 mcg/actuation HFAA Inhale 2 puffs into the lungs 2 (two) times daily. 1/7/25   Provider, Historical   cetirizine (ZYRTEC) 10 MG tablet Take 1 tablet (10 mg total) by mouth once daily. 8/8/24 8/3/25  Abiodun Recinos DO   clopidogreL (PLAVIX) 75 mg tablet Take 1 tablet (75 mg total) by mouth once daily. 3/4/25   Marino Marquez DO   folic acid (FOLVITE) 1 MG tablet Take 1 tablet (1 mg total) by mouth once daily. 4/9/24 4/9/25  Tha Edmond MD   furosemide (LASIX) 40 MG tablet Take 1 tablet (40 mg total) by mouth 2 (two) times a day. 3/4/25   Marino Marquez DO   gabapentin (NEURONTIN) 300 MG capsule Take 300 mg by mouth 3 (three) times daily.    Provider, Historical   metoprolol succinate (TOPROL-XL) 25 MG 24 hr tablet Take 0.5 tablets (12.5 mg total) by mouth once daily. 3/4/25 3/4/26  Marino Marquez DO   nicotine (NICODERM CQ) 14 mg/24 hr Place 1 patch onto the skin daily as needed. 3/4/25   Marino Marquez DO   nicotine (NICODERM CQ) 21 mg/24 hr Place 1 patch onto the skin once daily. 3/20/25   Heaven Page MD   nitrofurantoin, macrocrystal-monohydrate, (MACROBID) 100 MG capsule Take 1 capsule (100 mg total) by mouth 2 (two) times daily. 3/4/25   Marino Marquez DO   pantoprazole (PROTONIX) 40 MG tablet Take 1 tablet (40 mg total) by mouth 2 (two) times a day. 9/23/24   Abiodun Recinos,    potassium chloride SA (K-DUR,KLOR-CON)  20 MEQ tablet Take 1 tablet (20 mEq total) by mouth 2 (two) times daily. 1/28/25 1/28/26  Vernon Cifuentes MD   rivaroxaban (XARELTO) 20 mg Tab Take 1 tablet (20 mg total) by mouth Daily. 3/4/25   Marino Marquez DO   sertraline (ZOLOFT) 100 MG tablet Take 100 mg by mouth once daily.    Provider, Historical   vitamin D (VITAMIN D3) 1000 units Tab Take 1,000 Units by mouth once daily.    Provider, Historical       Current/Inpatient Medications:  Infusions:   0.9% NaCl   Intravenous Continuous   Stopped at 05/04/25 8137     Scheduled:   atorvastatin  80 mg Oral Daily    clopidogreL  75 mg Oral Daily    ferrous sulfate  1 tablet Oral Q48H    folic acid  1 mg Oral Daily    hydrocortisone sodium succinate  50 mg Intravenous Q8H    ipratropium  0.5 mg Nebulization Q12H    levalbuterol  1.25 mg Nebulization Q12H    miconazole NITRATE 2 %   Topical (Top) BID    pantoprazole  40 mg Oral BID    QUEtiapine  100 mg Oral QHS    rivaroxaban  20 mg Oral Daily    sacubitriL-valsartan  1 tablet Oral BID    spironolactone  25 mg Oral Daily    vitamin D  1,000 Units Oral Daily     PRN:    Current Facility-Administered Medications:     acetaminophen, 650 mg, Oral, Q4H PRN    aluminum-magnesium hydroxide, 30 mL, Oral, BID PRN    dextrose 50%, 12.5 g, Intravenous, PRN    dextrose 50%, 25 g, Intravenous, PRN    diphenhydrAMINE, 25 mg, Intravenous, Q6H PRN    glucagon (human recombinant), 1 mg, Intramuscular, PRN    glucose, 16 g, Oral, PRN    glucose, 24 g, Oral, PRN    guaiFENesin 100 mg/5 ml, 200 mg, Oral, Q4H PRN    hydrALAZINE, 10 mg, Intravenous, Q4H PRN    hydrocortisone, , Topical (Top), TID PRN    insulin aspart U-100, 0-5 Units, Subcutaneous, QID (AC + HS) PRN    lorazepam, 1 mg, Intravenous, Q6H PRN    melatonin, 6 mg, Oral, Nightly PRN    naloxone, 0.02 mg, Intravenous, PRN    nicotine, 1 patch, Transdermal, Daily PRN    polyethylene glycol, 17 g, Oral, Daily PRN    prochlorperazine, 5 mg, Intravenous, Q6H PRN    senna-docusate,  1 tablet, Oral, BID PRN    simethicone, 1 tablet, Oral, TID PRN    sodium chloride 0.9%, 10 mL, Intravenous, PRN    traZODone, 25 mg, Oral, Nightly PRN    Objective:     24-hour Vitals:   Temp:  [98 °F (36.7 °C)-98.3 °F (36.8 °C)] 98.3 °F (36.8 °C)  Pulse:  [] 82  Resp:  [10-31] 27  SpO2:  [90 %-100 %] 100 %  BP: ()/(50-79) 74/55    Vitals: BP (!) 74/55   Pulse 82   Temp 98.3 °F (36.8 °C)   Resp (!) 27   Ht 6' (1.829 m)   Wt 85 kg (187 lb 6.3 oz)   SpO2 100%   BMI 25.41 kg/m²     Intake/Output Summary (Last 24 hours) at 5/13/2025 1731  Last data filed at 5/13/2025 1300  Gross per 24 hour   Intake 240 ml   Output 700 ml   Net -460 ml       Physical Exam  Vitals reviewed.   Constitutional:       General: He is not in acute distress.     Appearance: Normal appearance. He is normal weight. He is not ill-appearing.   HENT:      Head: Normocephalic and atraumatic.      Right Ear: External ear normal.      Left Ear: External ear normal.      Nose: Nose normal.   Eyes:      Conjunctiva/sclera: Conjunctivae normal.   Cardiovascular:      Rate and Rhythm: Normal rate. Rhythm irregular.      Pulses: Normal pulses.      Heart sounds: Murmur heard.   Pulmonary:      Effort: Pulmonary effort is normal. No respiratory distress.      Breath sounds: No stridor. No wheezing, rhonchi or rales.   Chest:      Chest wall: No tenderness.   Abdominal:      General: Bowel sounds are normal. There is no distension.      Palpations: Abdomen is soft.   Musculoskeletal:      Cervical back: Neck supple.      Right lower leg: Edema present.      Left lower leg: Edema present.      Comments: Trace LE edema.    Skin:     General: Skin is warm and dry.      Capillary Refill: Capillary refill takes less than 2 seconds.   Neurological:      Mental Status: He is alert and oriented to person, place, and time.   Psychiatric:         Mood and Affect: Mood normal.         Behavior: Behavior normal.        Labs:   I have reviewed the  following labs below:    Recent Labs   Lab 05/07/25  0013 05/07/25  0426 05/07/25  0813 05/08/25  0759 05/08/25  1233 05/11/25  0409 05/11/25  0814 05/12/25  0356 05/12/25  0828 05/13/25  0253 05/13/25  1234 05/13/25  1453   WBC  --  8.54   < >  --    < > 9.30  --  9.65  --  8.38  --   --    HGB  --  7.7*   < >  --    < > 7.9*  --  8.0*  --  7.8*  --   --    HCT  --  22.9*   < >  --    < > 24.3*  --  24.7*  --  24.6*  --   --    PLT  --  149   < >  --    < > 166  --  154  --  153  --   --    * 135*   < > 132*   < > 132*   < > 134*   < > 133* 133* 136   K 3.5 2.7*   < > 2.7*   < > 3.1*   < > 3.5   < > 4.3 3.8 3.9   CL 86* 83*   < > 78*   < > 88*   < > 93*   < > 96* 99 99   CO2 30 35*   < > 38*   < > 30   < > 28   < > 25 25 27   BUN 56.3* 55.4*   < > 57.5*   < > 52.0*   < > 49.4*   < > 42.3* 42.8* 44.5*   CREATININE 1.71* 1.64*   < > 1.50*   < > 1.15   < > 1.09   < > 1.06 1.13 1.06   * 162*   < > 142*   < > 151*   < > 141*   < > 183* 160* 118*   CALCIUM 9.4 9.3   < > 9.3   < > 10.3*   < > 10.3*   < > 9.5 9.3 9.5   MG 2.20 2.10   < > 2.00   < > 2.10   < > 2.00   < > 1.80  1.90 1.90 1.90   PHOS 2.6 3.3   < > 3.8   < > 3.3   < > 3.8   < > 3.3  3.3 3.3 3.3   PROT  --   --   --   --   --   --   --   --   --  7.2  --   --    ALBUMIN  --   --   --   --   --   --   --   --   --  3.1*  --   --    BILITOT  --   --   --   --   --   --   --   --   --  4.6*  --   --    AST  --   --   --   --   --   --   --   --   --  49*  --   --    ALT  --   --   --   --   --   --   --   --   --  48  --   --    ALKPHOS  --   --   --   --   --   --   --   --   --  246*  --   --    TROPONINI 0.120* 0.095*  --   --   --   --   --   --   --  0.018  --   --    BNP  --   --   --  3,765.7*  --   --   --   --   --   --   --   --     < > = values in this interval not displayed.     Cardiovascular Studies/Imaging:   I have reviewed the following studies below:  TTE:   Summary  Show Result Comparison     Left Ventricle: The left ventricle is  severely dilated. Severely increased ventricular mass. Mildly increased wall thickness. There is severe eccentric hypertrophy. Severe global hypokinesis present. There is severely reduced systolic function. Quantitated ejection fraction is 22%. Grade III diastolic dysfunction.    Right Ventricle: The right ventricle has moderate enlargement. Systolic function is moderately reduced.    Left Atrium: Left atrium is severely dilated measuring 92ml/m2.    Right Atrium: Right atrium is severely dilated.    Aortic Valve: The aortic valve is a trileaflet valve. There is mild aortic valve sclerosis. There is no stenosis. There is no significant regurgitation.    Mitral Valve: The mitral valve is structurally normal. There is no stenosis. There is severe central regurgitation.    Tricuspid Valve: The tricuspid valve is structurally normal. There is moderate regurgitation.    Pulmonary Artery: There is moderate pulmonary hypertension. The estimated pulmonary artery systolic pressure is 59 mmHg.    IVC/SVC: Elevated venous pressure at 15 mmHg.    Pericardium: There is no pericardial effusion.    LHC/Cors: 3/26/2024  Coronary findings:     Dominance: right   Left main:  10-20% calcified distal left main disease  Left anterior descending artery:  50% calcified proximal stenosis  Circumflex artery:  Luminal irregularities  Right coronary artery:  Luminal irregularities    Imaging:   See imaging section for details.     Assessment:     Ran Reyes Jr. is a 69 y.o. male with NICM-HFrEF (LVEF 20-25%; G3DD), HTN, non obstructive CAD, AF on OAC, PAD, HLD, and COPD who presented with acute on chronic decompensated heart failure in cardiogenic shock now resolved.     Plan/Recommendations:   Acute on Chronic NICM-HFrEF with Resolved CS and Severe Mitral Regurgitation   -Likely will be unable to tolerate GDMT.   -We can try Entresto 1/2 tablet 24-26 mg BID for SBP > 100 mmHg.   -Hold BB. No SGLT2i (has history of Claudine's). Hold  MRA.   -Close monitoring of electrolytes. Replace as clinically warranted.   -Advanced HF evaluation outpatient.   -Have new LifeVest placed prior to discharged.     Atrial Fibrillation  -Hold BB for now given above.   -Continue anticoagulation for CVA risk reduction.     Hypertension  -ARNi as above.      Non Obstructive Epicardial Coronary Artery Disease  Peripheral Artery Disease  -SAPT and high intensity statin therapy.     Vernon Cifuentes MD  [facility] Chief Cardiology Fellow, PGY-6  [facility]                [1]   Social History  Tobacco Use    Smoking status: Every Day     Current packs/day: 0.50     Average packs/day: 0.8 packs/day for 50.4 years (40.9 ttl pk-yrs)     Types: Cigarettes     Start date: 1975     Passive exposure: Current    Smokeless tobacco: Never    Tobacco comments:     States smoke 2-3 cigarettes per day   Substance Use Topics    Alcohol use: Yes     Alcohol/week: 3.0 standard drinks of alcohol     Types: 3 Cans of beer per week     Comment: socially    Drug use: Not Currently     Frequency: 1.0 times per week     Types: Marijuana     Comment: no use in over 1 year

## 2025-05-13 NOTE — PROGRESS NOTES
Inpatient Nutrition Assessment    Admit Date: 4/21/2025   Total duration of encounter: 22 days   Patient Age: 69 y.o.    Nutrition Recommendation/Prescription     Continue heart healthy/ 1.5 L fluid restriction; encouraged oral intake   continue boost plus -1 carton daily; Boost Plus (provides 360 kcal, 14 g protein per serving) . Pt request extra gravy/sauce; food too dry; will accommodate   Electrolyte replacement as needed   MVI/fe  Biweekly wt  Pt education on diet complete  Will monitor nutrition status     Communication of Recommendations: reviewed with nurse and reviewed with patient    Nutrition Assessment     Malnutrition Assessment/Nutrition-Focused Physical Exam    Malnutrition Context: chronic illness (05/13/25 1312)  Malnutrition Level: moderate (05/13/25 1312)  Energy Intake (Malnutrition): less than 75% for greater than or equal to 1 month (05/13/25 1312)  Weight Loss (Malnutrition): greater than 5% in 1 month (05/13/25 1312)  Subcutaneous Fat (Malnutrition): mild depletion (05/13/25 1312)  Orbital Region (Subcutaneous Fat Loss): mild depletion  Upper Arm Region (Subcutaneous Fat Loss): mild depletion     Muscle Mass (Malnutrition): mild depletion (05/13/25 1312)     Clavicle Bone Region (Muscle Loss): mild depletion         Fluid Accumulation (Malnutrition): mild (05/13/25 1312)     Hand  Strength, Right (Malnutrition): Unable to assess (05/13/25 1312)  A minimum of two characteristics is recommended for diagnosis of either severe or non-severe malnutrition.    Chart Review    Reason Seen: continuous nutrition monitoring and follow-up    Malnutrition Screening Tool Results   Have you recently lost weight without trying?: No  Have you been eating poorly because of a decreased appetite?: No   MST Score: 0   Diagnosis:  Afib, CAD, heart failure, pulmonary HTN, volume overload, hyponatremia, ELIZABETH, anion gap acidemia, abnormal UA, PVD, anemia, COPD, glaucoma ; cardiogenic shock , sepsis    Relevant  Medical History: tobacco use, COPD, HTN, HLD, Afib, heart failure, PVD, fourniers gangrene    Scheduled Medications:  atorvastatin, 80 mg, Daily  clopidogreL, 75 mg, Daily  ferrous sulfate, 1 tablet, Q48H  folic acid, 1 mg, Daily  hydrocortisone sodium succinate, 50 mg, Q8H  ipratropium, 0.5 mg, Q12H  levalbuterol, 1.25 mg, Q12H  miconazole NITRATE 2 %, , BID  pantoprazole, 40 mg, BID  QUEtiapine, 100 mg, QHS  rivaroxaban, 20 mg, Daily  sacubitriL-valsartan, 1 tablet, BID  sodium chloride 0.9%, 250 mL, Once  spironolactone, 25 mg, Daily  vitamin D, 1,000 Units, Daily    Continuous Infusions:  0.9% NaCl, Last Rate: Stopped (05/04/25 2254)    PRN Medications:  acetaminophen, 650 mg, Q4H PRN  aluminum-magnesium hydroxide, 30 mL, BID PRN  dextrose 50%, 12.5 g, PRN  dextrose 50%, 25 g, PRN  diphenhydrAMINE, 25 mg, Q6H PRN  glucagon (human recombinant), 1 mg, PRN  glucose, 16 g, PRN  glucose, 24 g, PRN  guaiFENesin 100 mg/5 ml, 200 mg, Q4H PRN  hydrALAZINE, 10 mg, Q4H PRN  hydrocortisone, , TID PRN  insulin aspart U-100, 0-5 Units, QID (AC + HS) PRN  lorazepam, 1 mg, Q6H PRN  melatonin, 6 mg, Nightly PRN  naloxone, 0.02 mg, PRN  nicotine, 1 patch, Daily PRN  polyethylene glycol, 17 g, Daily PRN  prochlorperazine, 5 mg, Q6H PRN  senna-docusate, 1 tablet, BID PRN  simethicone, 1 tablet, TID PRN  sodium chloride 0.9%, 10 mL, PRN  traZODone, 25 mg, Nightly PRN    Calorie Containing IV Medications: no significant kcals from medications at this time    Recent Labs   Lab 05/07/25  0426 05/07/25  0813 05/08/25  0439 05/08/25  0759 05/09/25  0407 05/09/25  0821 05/10/25  0346 05/10/25  0805 05/11/25  0409 05/11/25  0814 05/12/25  0356 05/12/25  0828 05/12/25  1229 05/12/25  1535 05/12/25  1952 05/12/25  2358 05/13/25  0253 05/13/25  1234   *   < > 131*   < > 131*   < > 129*   < > 132*   < > 134* 135* 134* 135* 134* 134* 133* 133*   K 2.7*   < > 3.2*   < > 3.5   < > 2.6*   < > 3.1*   < > 3.5 3.7 3.8 3.5 4.3 4.4 4.3 3.8    CALCIUM 9.3   < > 9.5   < > 9.4   < > 9.8   < > 10.3*   < > 10.3* 10.2* 10.2* 10.3* 9.9 9.6 9.5 9.3   PHOS 3.3   < > 3.8   < > 3.1   < > 3.0   < > 3.3   < > 3.8 3.5 3.3 3.4 3.4 3.3 3.3  3.3 3.3   MG 2.10   < > 2.10   < > 2.10   < > 2.20   < > 2.10   < > 2.00 2.00 2.00 2.00 1.90 1.80 1.80  1.90 1.90   CL 83*   < > 79*   < > 79*   < > 82*   < > 88*   < > 93* 93* 96* 94* 95* 97* 96* 99   CO2 35*   < > 36*   < > 35*   < > 33*   < > 30   < > 28 28 26 29 28 27 25 25   BUN 55.4*   < > 59.9*   < > 61.5*   < > 55.5*   < > 52.0*   < > 49.4* 44.8* 45.1* 44.8* 44.5* 43.6* 42.3* 42.8*   CREATININE 1.64*   < > 1.60*   < > 1.56*   < > 1.27*   < > 1.15   < > 1.09 1.08 0.97 1.01 1.00 0.98 1.06 1.13   EGFRNORACEVR 46   < > 47   < > 48   < > >60   < > >60   < > >60 >60 >60 >60 >60 >60 >60 >60   BILITOT  --   --   --   --   --   --   --   --   --   --   --   --   --   --   --   --  4.6*  --    ALKPHOS  --   --   --   --   --   --   --   --   --   --   --   --   --   --   --   --  246*  --    ALT  --   --   --   --   --   --   --   --   --   --   --   --   --   --   --   --  48  --    AST  --   --   --   --   --   --   --   --   --   --   --   --   --   --   --   --  49*  --    ALBUMIN  --   --   --   --   --   --   --   --   --   --   --   --   --   --   --   --  3.1*  --    WBC 8.54  --  9.53  --  9.42  --  9.18  --  9.30  --  9.65  --   --   --   --   --  8.38  --    HGB 7.7*  --  7.6*  --  7.7*  --  7.5*  --  7.9*  --  8.0*  --   --   --   --   --  7.8*  --    HCT 22.9*  --  23.2*  --  23.0*  --  22.6*  --  24.3*  --  24.7*  --   --   --   --   --  24.6*  --     < > = values in this interval not displayed.     Nutrition Orders:  Diet Heart Healthy Fluid - 1500mL; Standard Tray  Dietary nutrition supplements Daily; Boost Plus Nutritional Drink - Rich Chocolate    Appetite/Oral Intake: fair/50-75% of meals  Factors Affecting Nutritional Intake: decreased appetite and shortness of breath  Social Needs Impacting Access to Food: none  identified  Food/Orthodox/Cultural Preferences: none reported  Food Allergies: none reported  Last Bowel Movement: 05/11/25  Wound(s):  none    Comments  (5/13) Pt ate well for breakfast today; 100% of meal; overall po intake --fair--up/down; food too dry; will allow extra gravy/sauce to moisten food; noted wt decreased; partly fluid /partly wt loss--9% decrease from UBW. Pt is drinking ONS. LFTs remain elevated. Case management --working on possible SNF placement.    (5/9) Pt laying in bed; reported appetite --fluctuating --up/down; ate part of breakfast this am; drinking boost supplement. Bed wt decreased 88.6kg--on diuretic; wt has been fluctuating. Encouraged oral intake/supplement use. Labs acknowledged: Gluc (H)--monitor; Alk Phos /Tbili remain elevated.     (5/5) Pt sitting in chair; reported appetite up/down; not liking food; encouraged pt to select food options with nutrition ; pt eating approx 50% meals but does drink ONS. Wt stable 91.5kg (5-3). Labs acknowledged: Gluc (H)--no hx DM; LFTs remain elevated. H/H (L)--on Fe supplement. + LE edema; on diuretic. Current diet/ONS appropriate.     (5/1) Pt reported he ate well for breakfast; depends what is on menu; appetite fair/good; drinking ONS; wt fluctuates with fluid; noted low K--on electrolyte repletion. Tbili remains elevated. Current diet tx appropriate.    (4/29) Pt sitting in chair during rounds ; eating peanut butter cookies; reported not liking food; wants gravy on meat; will honor request; ate eggs and sausage for breakfast; fair po intake; pt is drinking ONS. No new wt--in chair/unable to weigh. H/h(L)--consider fe : Tbili remains elevated. Pt remains on levophed.     (4/25) Pt tolerating oral diet; po intake remains fair; encouraged ONS; noted low K/Mg--suggest electrolyte repletion. Pt remains on levophed/pressor support. Tbili--elevated. Pt wt 196#; diuresis.     (4/22) Pt reported fair po intake; eating 50-75% meals; + LE edema; mild  fat/muscle wasting; wt fluctuates with fluid; on diuretic; UBW between 205-225#. Pt willing to drink ONS; will order once daily --need to monitor fluid volume. Pt education complete on diet tx.     Anthropometrics    Height: 6' (182.9 cm), Height Method: Stated  Last Weight: 85 kg (187 lb 6.3 oz) (25 0600), Weight Method: Bed Scale  BMI (Calculated): 25.4  BMI Classification: overweight (BMI 25-29.9)        Ideal Body Weight (IBW), Male: 178 lb     % Ideal Body Weight, Male (lb): 109.37 %                 Usual Body Weight (UBW), k kg (wt fluctuates 205-225#; fluid)  % Usual Body Weight: 100     Usual Weight Provided By: patient and EMR weight history    Wt Readings from Last 5 Encounters:   25 85 kg (187 lb 6.3 oz)   25 102.1 kg (225 lb)   25 101.2 kg (223 lb)   25 102.5 kg (225 lb 14.4 oz)   02/15/25 103.6 kg (228 lb 6.3 oz)     Weight Change(s) Since Admission: -225#  Wt Readings from Last 1 Encounters:   25 0600 85 kg (187 lb 6.3 oz)   05/10/25 0523 89.1 kg (196 lb 6.9 oz)   25 0600 88.6 kg (195 lb 5.2 oz)   25 1928 93.7 kg (206 lb 9.1 oz)   25 0600 91.5 kg (201 lb 12.8 oz)   25 0700 90.3 kg (199 lb 1.2 oz)   25 0600 90.3 kg (199 lb 1.2 oz)   25 1030 88.3 kg (194 lb 10.7 oz)   25 0547 88.3 kg (194 lb 10.7 oz)   25 0600 89.1 kg (196 lb 6.9 oz)   25 0555 89.1 kg (196 lb 6.9 oz)   25 0502 87.8 kg (193 lb 9 oz)   25 0610 92.8 kg (204 lb 9.6 oz)   25 1709 96.6 kg (213 lb)   25 1137 97.5 kg (215 lb)   Admit Weight: 97.5 kg (215 lb) (25 1137), Weight Method: Stated    Estimated Needs    Weight Used For Calorie Calculations: 85 kg (187 lb 6.3 oz)  Energy Calorie Requirements (kcal): 2295 kcal/d; 27 kcal/kg  Energy Need Method: Kcal/kg  Weight Used For Protein Calculations: 85 kg (187 lb 6.3 oz)  Protein Requirements: 102 gm prtotein/d; 1.2 gm/kg  Fluid Requirements (mL): 2125 ml/d; 25 ml/kg  /CHF        Enteral Nutrition     Patient not receiving enteral nutrition at this time.    Parenteral Nutrition     Patient not receiving parenteral nutrition support at this time.    Evaluation of Received Nutrient Intake    Calories: not meeting estimated needs; (5/13) po intake fair   Protein: not meeting estimated needs    Patient Education     Education Provided: heart healthy diet  Teaching Method: explanation and printed materials  Comprehension: verbalizes understanding  Barriers to Learning: none evident  Expected Compliance: fair  Comments: All questions were answered and dietitian's contact information was provided.     Nutrition Diagnosis     PES: Inadequate oral intake related to chronic illness as evidenced by eating 75% or less meals . (active)     PES: Moderate chronic disease or condition related malnutrition Related to chronic illness  As Evidenced by:  - energy intake: < 75% for 3 weeks (meets criteria for < 75% for > 7 days (moderate - acute)) - muscle mass depletion: 2 areas of mild muscle loss (Trapezius, Clavicle) - loss of subcutaneous fat: 3 areas of mild fat loss (Buccal, Triceps Skinfold, Infraorbital) - fluid accumulation: 1 area of mild fluid accumulation (Lower extremities edema) active    Nutrition Interventions     Intervention(s): modified composition of meals/snacks, multivitamin/mineral supplement therapy, purpose of nutrition education, and collaboration with other providers  Intervention(s): Oral diet/nutrient modifications;Nutrition education;Care coordination or referral;Oral nutritional supplement    Goal: Meet greater than 80% of nutritional needs by follow-up. (goal progressing)  Goal: Maintain weight throughout hospitalization. (goal progressing)    Nutrition Goals & Monitoring     Dietitian will monitor: food and beverage intake, weight, and food/nutrition knowledge skill  Discharge planning: continue heart healthy diet with boost plus  oral supplements  Nutrition  Risk/Follow-Up: patient at increased nutrition risk; dietitian will follow-up twice weekly   Please consult if re-assessment needed sooner.

## 2025-05-13 NOTE — CONSULTS
"5/13/2025  Ran Reyes Jr.   1955   [id number]            Psychiatry Evaluation    Date of Admission: 4/21/2025 11:32 AM    Chief Complaint: Psychiatric consult for "level 2 placement"    SUBJECTIVE:   History of Present Illness (Per Critical Care MD):   "Ran Reyes Jr. is a 69 y.o. 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 Patient reports he can only ambulate about 20-30 feet before having to stop due to claudication pain which is chronic and unchanged compared to baseline. Since running out of his diuretic medications approximately 5 days ago he has noted progressively worsening lower extremity swelling causing leg heaviness and weakness exacerbating difficulty ambulating.  He also notes acute on chronic cough productive of a brownish sputum without hemoptysis.  Denies fever, chills, sweats.  Denies chest pain.  Endorses shortness of breath at rest and with ambulation but unchanged compared to baseline.  Denies abdominal pain, nausea, vomiting, constipation, diarrhea.  Denies increased or decreased urinary frequency, dysuria, or hematuria.  Sleeps with 2 pillows at night due to baseline orthopnea. Has not had to increase number of pillows or change sleeping habits. Wears 2L NC home oxygen at baseline for hx of COPD. Denies having increased oxygen requirements prior to ED presentation."    History of Present Illness (Psychiatry):  Mr. Reyes is a 69-year-old, male with a past psychiatric history " "that includes depression and anxiety. Presented to [facility] ED on 04/21/25 with generalized weakness and fatigue. Pending placement and level II triggered due to patients history. 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 States significant other soon left him after this. Affect is irritable at this time. He endorses middle of the night awakening. Denies recent impairments in appetite or energy. Denies feelings of guilt, hopelessness, or helplessness. Denies suicidal ideations. Denies a history of episodes consistent with kenny/hypomania. Does endorses intermittent anxiety but denies daily worry. Denies associated racing thoughts, muscle tension, or feeling restless or on edge. Denies history of auditory/visual hallucinations or paranoia and no evidence of psychosis at this time. Does not wish to start antidepressant therapy as "I'm already on to many medicines."           Past Psychiatric History:   Previous Psychiatric Hospitalizations: Denies   Previous Medication Trials: Sertraline  Previous Suicide Attempts: Denies  Outpatient psychiatrist: None    Current Medications:   Home Psychiatric Meds: None    Past Medical/Surgical History:   Past Medical History:   Diagnosis Date    A-fib     Anticoagulant long-term use     Anxiety     Aortic aneurysm     Cataract     CHF (congestive heart failure)     Chronic atrial fibrillation     COPD (chronic obstructive pulmonary disease)     Coronary artery disease     Depression     HLD (hyperlipidemia)     Hypertension     Mitral regurgitation     PAD (peripheral artery disease)     Primary open angle glaucoma (POAG)      Past Surgical History:   Procedure Laterality Date    ANGIOGRAM, CORONARY, WITH LEFT HEART CATHETERIZATION N/A 03/26/2024    Procedure: Angiogram, Coronary, with Left Heart Cath;  Surgeon: Adriano Montiel MD;  Location: [facility] CATH LAB;  Service: Cardiology;  Laterality: N/A;    ATHERECTOMY OF PERIPHERAL " VESSEL Left 09/12/2022    LEFT SFA ATHERECTOMY, BALLOON ANGIOPLASTY    CATARACT EXTRACTION W/  INTRAOCULAR LENS IMPLANT Left 03/09/2023    Procedure: EXTRACTION, CATARACT, WITH IOL INSERTION;  Surgeon: Georgi Borja MD;  Location: [facility];  Service: Ophthalmology;  Laterality: Left;  19.5  mac    EGD, WITH CLOSED BIOPSY  03/22/2024    Procedure: EGD, WITH CLOSED BIOPSY;  Surgeon: Ronald Calderon MD;  Location: [facility] ENDOSCOPY;  Service: Gastroenterology;;    ESOPHAGOGASTRODUODENOSCOPY N/A 03/22/2024    Procedure: EGD;  Surgeon: Ronald Calderon MD;  Location: [facility] ENDOSCOPY;  Service: Gastroenterology;  Laterality: N/A;    Heart Stent N/A     > 10yrs. AGO    INCISION AND DRAINAGE N/A 03/18/2024    Procedure: Incision and Drainage;  Surgeon: Fahad Rivas MD;  Location: [facility];  Service: Urology;  Laterality: N/A;  I&D SCROTAL ABSCESS    INSERTION OF STENT INTO PERIPHERAL VESSEL Right 10/17/2022    RIGHT SFA ATHERECTOMY, BALLOON ANGIOPLASTY, STENT; RIGHT ANTERIOR TIBIAL ARTERY ATHERECTOMY, BALLOON ANGIOPLASTY    ORCHIECTOMY Left 03/18/2024    Procedure: ORCHIECTOMY;  Surgeon: Fahad Rivas MD;  Location: [facility];  Service: Urology;  Laterality: Left;       Family Psychiatric History:   Denies     Allergies:   Review of patient's allergies indicates:  No Known Allergies    Substance Abuse History:   Tobacco: PPD  Alcohol: 1-2 beers occasionally  Illicit Substances: Denies  Treatment: Denies        Scheduled Meds:    atorvastatin  80 mg Oral Daily    clopidogreL  75 mg Oral Daily    ferrous sulfate  1 tablet Oral Q48H    folic acid  1 mg Oral Daily    hydrocortisone sodium succinate  50 mg Intravenous Q8H    ipratropium  0.5 mg Nebulization Q12H    levalbuterol  1.25 mg Nebulization Q12H    miconazole NITRATE 2 %   Topical (Top) BID    pantoprazole  40 mg Oral BID    QUEtiapine  100 mg Oral QHS    rivaroxaban  20 mg Oral Daily    sacubitriL-valsartan  1 tablet Oral BID     spironolactone  25 mg Oral Daily    vitamin D  1,000 Units Oral Daily      PRN Meds:   Current Facility-Administered Medications:     acetaminophen, 650 mg, Oral, Q4H PRN    aluminum-magnesium hydroxide, 30 mL, Oral, BID PRN    dextrose 50%, 12.5 g, Intravenous, PRN    dextrose 50%, 25 g, Intravenous, PRN    diphenhydrAMINE, 25 mg, Intravenous, Q6H PRN    glucagon (human recombinant), 1 mg, Intramuscular, PRN    glucose, 16 g, Oral, PRN    glucose, 24 g, Oral, PRN    guaiFENesin 100 mg/5 ml, 200 mg, Oral, Q4H PRN    hydrALAZINE, 10 mg, Intravenous, Q4H PRN    hydrocortisone, , Topical (Top), TID PRN    insulin aspart U-100, 0-5 Units, Subcutaneous, QID (AC + HS) PRN    lorazepam, 1 mg, Intravenous, Q6H PRN    melatonin, 6 mg, Oral, Nightly PRN    naloxone, 0.02 mg, Intravenous, PRN    nicotine, 1 patch, Transdermal, Daily PRN    polyethylene glycol, 17 g, Oral, Daily PRN    prochlorperazine, 5 mg, Intravenous, Q6H PRN    senna-docusate, 1 tablet, Oral, BID PRN    simethicone, 1 tablet, Oral, TID PRN    sodium chloride 0.9%, 10 mL, Intravenous, PRN    traZODone, 25 mg, Oral, Nightly PRN   Psychotherapeutics (From admission, onward)      Start     Stop Route Frequency Ordered    05/07/25 1549  LORazepam injection 1 mg         -- IV Every 6 hours PRN 05/07/25 1450    05/06/25 2100  QUEtiapine tablet 100 mg         -- Oral Nightly 05/06/25 0856    05/03/25 2100  trazodone split tablet 25 mg         -- Oral Nightly PRN 05/03/25 1109              Social History:  Housing Status: Lives alone  Relationship Status/Sexual Orientation: Single   Children: 0  Education: Graduated high school   Employment Status/Info: Not currently working    history: [employer], honorable discharge  Access to gun: Denies       Legal History:   Past Charges/Incarcerations: Denies   Pending charges: Denies      OBJECTIVE:       Vitals   Vitals:    05/13/25 1200   BP: (!) 78/55   Pulse: 86   Resp: 12   Temp:         Labs/Imaging/Studies:   Recent  Results (from the past 48 hours)   Basic Metabolic Panel    Collection Time: 05/11/25  4:03 PM   Result Value Ref Range    Sodium 135 (L) 136 - 145 mmol/L    Potassium 3.6 3.5 - 5.1 mmol/L    Chloride 91 (L) 98 - 107 mmol/L    CO2 30 23 - 31 mmol/L    Glucose 133 (H) 82 - 115 mg/dL    Blood Urea Nitrogen 49.6 (H) 8.4 - 25.7 mg/dL    Creatinine 1.20 0.72 - 1.25 mg/dL    BUN/Creatinine Ratio 41     Calcium 10.4 (H) 8.8 - 10.0 mg/dL    Anion Gap 14.0 mEq/L    eGFR >60 mL/min/1.73/m2   Magnesium    Collection Time: 05/11/25  4:03 PM   Result Value Ref Range    Magnesium Level 2.00 1.60 - 2.60 mg/dL   Phosphorus    Collection Time: 05/11/25  4:03 PM   Result Value Ref Range    Phosphorus Level 3.2 2.3 - 4.7 mg/dL   Basic Metabolic Panel    Collection Time: 05/11/25  7:48 PM   Result Value Ref Range    Sodium 134 (L) 136 - 145 mmol/L    Potassium 4.0 3.5 - 5.1 mmol/L    Chloride 91 (L) 98 - 107 mmol/L    CO2 31 23 - 31 mmol/L    Glucose 101 82 - 115 mg/dL    Blood Urea Nitrogen 50.2 (H) 8.4 - 25.7 mg/dL    Creatinine 1.20 0.72 - 1.25 mg/dL    BUN/Creatinine Ratio 42     Calcium 10.6 (H) 8.8 - 10.0 mg/dL    Anion Gap 12.0 mEq/L    eGFR >60 mL/min/1.73/m2   Magnesium    Collection Time: 05/11/25  7:48 PM   Result Value Ref Range    Magnesium Level 2.10 1.60 - 2.60 mg/dL   Phosphorus    Collection Time: 05/11/25  7:48 PM   Result Value Ref Range    Phosphorus Level 3.2 2.3 - 4.7 mg/dL   Basic Metabolic Panel    Collection Time: 05/12/25  3:56 AM   Result Value Ref Range    Sodium 134 (L) 136 - 145 mmol/L    Potassium 3.5 3.5 - 5.1 mmol/L    Chloride 93 (L) 98 - 107 mmol/L    CO2 28 23 - 31 mmol/L    Glucose 141 (H) 82 - 115 mg/dL    Blood Urea Nitrogen 49.4 (H) 8.4 - 25.7 mg/dL    Creatinine 1.09 0.72 - 1.25 mg/dL    BUN/Creatinine Ratio 45     Calcium 10.3 (H) 8.8 - 10.0 mg/dL    Anion Gap 13.0 mEq/L    eGFR >60 mL/min/1.73/m2   Magnesium    Collection Time: 05/12/25  3:56 AM   Result Value Ref Range    Magnesium Level 2.00  1.60 - 2.60 mg/dL   Phosphorus    Collection Time: 05/12/25  3:56 AM   Result Value Ref Range    Phosphorus Level 3.8 2.3 - 4.7 mg/dL   CBC with Differential    Collection Time: 05/12/25  3:56 AM   Result Value Ref Range    WBC 9.65 4.50 - 11.50 x10(3)/mcL    RBC 3.22 (L) 4.70 - 6.10 x10(6)/mcL    Hgb 8.0 (L) 14.0 - 18.0 g/dL    Hct 24.7 (L) 42.0 - 52.0 %    MCV 76.7 (L) 80.0 - 94.0 fL    MCH 24.8 (L) 27.0 - 31.0 pg    MCHC 32.4 (L) 33.0 - 36.0 g/dL    RDW 26.2 (H) 11.5 - 17.0 %    Platelet 154 130 - 400 x10(3)/mcL    MPV      IPF 4.3 0.9 - 11.2 %    Neut % 81.0 %    Lymph % 7.0 %    Mono % 10.7 %    Eos % 0.6 %    Basophil % 0.0 %    Imm Grans % 0.7 %    Neut # 7.81 2.1 - 9.2 x10(3)/mcL    Lymph # 0.68 0.6 - 4.6 x10(3)/mcL    Mono # 1.03 0.1 - 1.3 x10(3)/mcL    Eos # 0.06 0 - 0.9 x10(3)/mcL    Baso # 0.00 <=0.2 x10(3)/mcL    Imm Gran # 0.07 (H) 0.00 - 0.04 x10(3)/mcL    NRBC% 0.0 %   POCT glucose    Collection Time: 05/12/25  7:22 AM   Result Value Ref Range    POCT Glucose 142 (H) 70 - 110 mg/dL   Basic Metabolic Panel    Collection Time: 05/12/25  8:28 AM   Result Value Ref Range    Sodium 135 (L) 136 - 145 mmol/L    Potassium 3.7 3.5 - 5.1 mmol/L    Chloride 93 (L) 98 - 107 mmol/L    CO2 28 23 - 31 mmol/L    Glucose 131 (H) 82 - 115 mg/dL    Blood Urea Nitrogen 44.8 (H) 8.4 - 25.7 mg/dL    Creatinine 1.08 0.72 - 1.25 mg/dL    BUN/Creatinine Ratio 41     Calcium 10.2 (H) 8.8 - 10.0 mg/dL    Anion Gap 14.0 mEq/L    eGFR >60 mL/min/1.73/m2   Magnesium    Collection Time: 05/12/25  8:28 AM   Result Value Ref Range    Magnesium Level 2.00 1.60 - 2.60 mg/dL   Phosphorus    Collection Time: 05/12/25  8:28 AM   Result Value Ref Range    Phosphorus Level 3.5 2.3 - 4.7 mg/dL   POCT glucose    Collection Time: 05/12/25 11:22 AM   Result Value Ref Range    POCT Glucose 155 (H) 70 - 110 mg/dL   Basic Metabolic Panel    Collection Time: 05/12/25 12:29 PM   Result Value Ref Range    Sodium 134 (L) 136 - 145 mmol/L    Potassium  3.8 3.5 - 5.1 mmol/L    Chloride 96 (L) 98 - 107 mmol/L    CO2 26 23 - 31 mmol/L    Glucose 115 82 - 115 mg/dL    Blood Urea Nitrogen 45.1 (H) 8.4 - 25.7 mg/dL    Creatinine 0.97 0.72 - 1.25 mg/dL    BUN/Creatinine Ratio 46     Calcium 10.2 (H) 8.8 - 10.0 mg/dL    Anion Gap 12.0 mEq/L    eGFR >60 mL/min/1.73/m2   Magnesium    Collection Time: 05/12/25 12:29 PM   Result Value Ref Range    Magnesium Level 2.00 1.60 - 2.60 mg/dL   Phosphorus    Collection Time: 05/12/25 12:29 PM   Result Value Ref Range    Phosphorus Level 3.3 2.3 - 4.7 mg/dL   Basic Metabolic Panel    Collection Time: 05/12/25  3:35 PM   Result Value Ref Range    Sodium 135 (L) 136 - 145 mmol/L    Potassium 3.5 3.5 - 5.1 mmol/L    Chloride 94 (L) 98 - 107 mmol/L    CO2 29 23 - 31 mmol/L    Glucose 107 82 - 115 mg/dL    Blood Urea Nitrogen 44.8 (H) 8.4 - 25.7 mg/dL    Creatinine 1.01 0.72 - 1.25 mg/dL    BUN/Creatinine Ratio 44     Calcium 10.3 (H) 8.8 - 10.0 mg/dL    Anion Gap 12.0 mEq/L    eGFR >60 mL/min/1.73/m2   Magnesium    Collection Time: 05/12/25  3:35 PM   Result Value Ref Range    Magnesium Level 2.00 1.60 - 2.60 mg/dL   Phosphorus    Collection Time: 05/12/25  3:35 PM   Result Value Ref Range    Phosphorus Level 3.4 2.3 - 4.7 mg/dL   Lactic Acid, Plasma    Collection Time: 05/12/25  3:35 PM   Result Value Ref Range    Lactic Acid Level 1.8 0.5 - 2.2 mmol/L   Light Green Top Hold    Collection Time: 05/12/25  3:44 PM   Result Value Ref Range    Extra Tube Hold for add-ons.    POCT glucose    Collection Time: 05/12/25  4:56 PM   Result Value Ref Range    POCT Glucose 124 (H) 70 - 110 mg/dL   Basic Metabolic Panel    Collection Time: 05/12/25  7:52 PM   Result Value Ref Range    Sodium 134 (L) 136 - 145 mmol/L    Potassium 4.3 3.5 - 5.1 mmol/L    Chloride 95 (L) 98 - 107 mmol/L    CO2 28 23 - 31 mmol/L    Glucose 147 (H) 82 - 115 mg/dL    Blood Urea Nitrogen 44.5 (H) 8.4 - 25.7 mg/dL    Creatinine 1.00 0.72 - 1.25 mg/dL    BUN/Creatinine Ratio  45     Calcium 9.9 8.8 - 10.0 mg/dL    Anion Gap 11.0 mEq/L    eGFR >60 mL/min/1.73/m2   Magnesium    Collection Time: 05/12/25  7:52 PM   Result Value Ref Range    Magnesium Level 1.90 1.60 - 2.60 mg/dL   Phosphorus    Collection Time: 05/12/25  7:52 PM   Result Value Ref Range    Phosphorus Level 3.4 2.3 - 4.7 mg/dL   Lactic Acid, Plasma    Collection Time: 05/12/25  7:52 PM   Result Value Ref Range    Lactic Acid Level 2.0 0.5 - 2.2 mmol/L   Basic Metabolic Panel    Collection Time: 05/12/25 11:58 PM   Result Value Ref Range    Sodium 134 (L) 136 - 145 mmol/L    Potassium 4.4 3.5 - 5.1 mmol/L    Chloride 97 (L) 98 - 107 mmol/L    CO2 27 23 - 31 mmol/L    Glucose 162 (H) 82 - 115 mg/dL    Blood Urea Nitrogen 43.6 (H) 8.4 - 25.7 mg/dL    Creatinine 0.98 0.72 - 1.25 mg/dL    BUN/Creatinine Ratio 44     Calcium 9.6 8.8 - 10.0 mg/dL    Anion Gap 10.0 mEq/L    eGFR >60 mL/min/1.73/m2   Magnesium    Collection Time: 05/12/25 11:58 PM   Result Value Ref Range    Magnesium Level 1.80 1.60 - 2.60 mg/dL   Phosphorus    Collection Time: 05/12/25 11:58 PM   Result Value Ref Range    Phosphorus Level 3.3 2.3 - 4.7 mg/dL   POCT glucose    Collection Time: 05/13/25 12:54 AM   Result Value Ref Range    POCT Glucose 171 (H) 70 - 110 mg/dL   Comprehensive Metabolic Panel    Collection Time: 05/13/25  2:53 AM   Result Value Ref Range    Sodium 133 (L) 136 - 145 mmol/L    Potassium 4.3 3.5 - 5.1 mmol/L    Chloride 96 (L) 98 - 107 mmol/L    CO2 25 23 - 31 mmol/L    Glucose 183 (H) 82 - 115 mg/dL    Blood Urea Nitrogen 42.3 (H) 8.4 - 25.7 mg/dL    Creatinine 1.06 0.72 - 1.25 mg/dL    Calcium 9.5 8.8 - 10.0 mg/dL    Protein Total 7.2 5.8 - 7.6 gm/dL    Albumin 3.1 (L) 3.4 - 4.8 g/dL    Globulin 4.1 (H) 2.4 - 3.5 gm/dL    Albumin/Globulin Ratio 0.8 (L) 1.1 - 2.0 ratio    Bilirubin Total 4.6 (H) <=1.5 mg/dL     (H) 40 - 150 unit/L    ALT 48 0 - 55 unit/L    AST 49 (H) 11 - 45 unit/L    eGFR >60 mL/min/1.73/m2    Anion Gap 12.0  mEq/L    BUN/Creatinine Ratio 40    Lactic Acid, Plasma    Collection Time: 05/13/25  2:53 AM   Result Value Ref Range    Lactic Acid Level 1.3 0.5 - 2.2 mmol/L   Troponin I    Collection Time: 05/13/25  2:53 AM   Result Value Ref Range    Troponin-I 0.018 0.000 - 0.045 ng/mL   Magnesium    Collection Time: 05/13/25  2:53 AM   Result Value Ref Range    Magnesium Level 1.90 1.60 - 2.60 mg/dL   Phosphorus    Collection Time: 05/13/25  2:53 AM   Result Value Ref Range    Phosphorus Level 3.3 2.3 - 4.7 mg/dL   Magnesium    Collection Time: 05/13/25  2:53 AM   Result Value Ref Range    Magnesium Level 1.80 1.60 - 2.60 mg/dL   Phosphorus    Collection Time: 05/13/25  2:53 AM   Result Value Ref Range    Phosphorus Level 3.3 2.3 - 4.7 mg/dL   CBC with Differential    Collection Time: 05/13/25  2:53 AM   Result Value Ref Range    WBC 8.38 4.50 - 11.50 x10(3)/mcL    RBC 3.19 (L) 4.70 - 6.10 x10(6)/mcL    Hgb 7.8 (L) 14.0 - 18.0 g/dL    Hct 24.6 (L) 42.0 - 52.0 %    MCV 77.1 (L) 80.0 - 94.0 fL    MCH 24.5 (L) 27.0 - 31.0 pg    MCHC 31.7 (L) 33.0 - 36.0 g/dL    RDW 26.0 (H) 11.5 - 17.0 %    Platelet 153 130 - 400 x10(3)/mcL    MPV      IPF 4.3 0.9 - 11.2 %    Neut % 89.7 %    Lymph % 4.4 %    Mono % 5.1 %    Eos % 0.1 %    Basophil % 0.0 %    Imm Grans % 0.7 %    Neut # 7.51 2.1 - 9.2 x10(3)/mcL    Lymph # 0.37 (L) 0.6 - 4.6 x10(3)/mcL    Mono # 0.43 0.1 - 1.3 x10(3)/mcL    Eos # 0.01 0 - 0.9 x10(3)/mcL    Baso # 0.00 <=0.2 x10(3)/mcL    Imm Gran # 0.06 (H) 0.00 - 0.04 x10(3)/mcL    NRBC% 0.0 %   POCT glucose    Collection Time: 05/13/25  8:08 AM   Result Value Ref Range    POCT Glucose 169 (H) 70 - 110 mg/dL   POCT glucose    Collection Time: 05/13/25 12:18 PM   Result Value Ref Range    POCT Glucose 218 (H) 70 - 110 mg/dL   Basic Metabolic Panel    Collection Time: 05/13/25 12:34 PM   Result Value Ref Range    Sodium 133 (L) 136 - 145 mmol/L    Potassium 3.8 3.5 - 5.1 mmol/L    Chloride 99 98 - 107 mmol/L    CO2 25 23 - 31  "mmol/L    Glucose 160 (H) 82 - 115 mg/dL    Blood Urea Nitrogen 42.8 (H) 8.4 - 25.7 mg/dL    Creatinine 1.13 0.72 - 1.25 mg/dL    BUN/Creatinine Ratio 38     Calcium 9.3 8.8 - 10.0 mg/dL    Anion Gap 9.0 mEq/L    eGFR >60 mL/min/1.73/m2   Magnesium    Collection Time: 05/13/25 12:34 PM   Result Value Ref Range    Magnesium Level 1.90 1.60 - 2.60 mg/dL   Phosphorus    Collection Time: 05/13/25 12:34 PM   Result Value Ref Range    Phosphorus Level 3.3 2.3 - 4.7 mg/dL   Basic Metabolic Panel    Collection Time: 05/13/25  2:53 PM   Result Value Ref Range    Sodium 136 136 - 145 mmol/L    Potassium 3.9 3.5 - 5.1 mmol/L    Chloride 99 98 - 107 mmol/L    CO2 27 23 - 31 mmol/L    Glucose 118 (H) 82 - 115 mg/dL    Blood Urea Nitrogen 44.5 (H) 8.4 - 25.7 mg/dL    Creatinine 1.06 0.72 - 1.25 mg/dL    BUN/Creatinine Ratio 42     Calcium 9.5 8.8 - 10.0 mg/dL    Anion Gap 10.0 mEq/L    eGFR >60 mL/min/1.73/m2   Magnesium    Collection Time: 05/13/25  2:53 PM   Result Value Ref Range    Magnesium Level 1.90 1.60 - 2.60 mg/dL   Phosphorus    Collection Time: 05/13/25  2:53 PM   Result Value Ref Range    Phosphorus Level 3.3 2.3 - 4.7 mg/dL   Lactic Acid, Plasma    Collection Time: 05/13/25  2:53 PM   Result Value Ref Range    Lactic Acid Level 1.3 0.5 - 2.2 mmol/L      No results found for: "PHENYTOIN", "PHENOBARB", "VALPROATE", "CBMZ"        Psychiatric Mental Status Exam:  General Appearance: appears stated age, dressed in hospital garb, lying in bed, in no acute distress  Arousal: alert  Behavior: cooperative, hostile  Movements and Motor Activity: no tics, no tremors, no akathisia, no dystonia, no evidence of tardive dyskinesia  Orientation: oriented to person, place, time, and situation  Speech: normal rate, rhythm, volume, tone and pitch  Mood: Depressed  Affect: irritable  Thought Process: linear, goal-directed  Associations: no loosening of associations  Thought Content and Perceptions: no suicidal or homicidal ideation, no " auditory or visual hallucinations, no paranoid ideation, no ideas of reference, no evidence of delusions or psychosis  Recent and Remote Memory: grossly intact; per interview/observation with patient  Attention and Concentration: grossly intact; per interview/observation with patient  Fund of Knowledge: grossly intact; based on history, vocabulary, fund of knowledge, syntax, grammar, and content  Insight: adequate; based on understanding of severity of illness and HPI  Judgment: adequate; based on patient's behavior and HPI        ASSESSMENT/PLAN:   Diagnoses:  Unspecified depressive disorder    Past Medical History:   Diagnosis Date    A-fib     Anticoagulant long-term use     Anxiety     Aortic aneurysm     Cataract     CHF (congestive heart failure)     Chronic atrial fibrillation     COPD (chronic obstructive pulmonary disease)     Coronary artery disease     Depression     HLD (hyperlipidemia)     Hypertension     Mitral regurgitation     PAD (peripheral artery disease)     Primary open angle glaucoma (POAG)          Problem lists and Management Plans:  Medication Management  Continue trazodone 25mg PO QHS PRN insomnia. Consider increase if not helpful.  Legal  PEC not recommended. Not at imminent risk of harm to self or others. Not currently gravely disabled due to acute psychiatric condition.  Psychiatry will sign-off          Avelino Pedroza

## 2025-05-14 LAB
ANION GAP SERPL CALC-SCNC: 10 MEQ/L
ANION GAP SERPL CALC-SCNC: 12 MEQ/L
ANION GAP SERPL CALC-SCNC: 13 MEQ/L
ANION GAP SERPL CALC-SCNC: 14 MEQ/L
BASOPHILS # BLD AUTO: 0.01 X10(3)/MCL
BASOPHILS NFR BLD AUTO: 0.1 %
BUN SERPL-MCNC: 40 MG/DL (ref 8.4–25.7)
BUN SERPL-MCNC: 42.7 MG/DL (ref 8.4–25.7)
BUN SERPL-MCNC: 42.8 MG/DL (ref 8.4–25.7)
BUN SERPL-MCNC: 46.4 MG/DL (ref 8.4–25.7)
BUN SERPL-MCNC: 49.2 MG/DL (ref 8.4–25.7)
BUN SERPL-MCNC: 52.2 MG/DL (ref 8.4–25.7)
CALCIUM SERPL-MCNC: 9 MG/DL (ref 8.8–10)
CALCIUM SERPL-MCNC: 9.1 MG/DL (ref 8.8–10)
CALCIUM SERPL-MCNC: 9.2 MG/DL (ref 8.8–10)
CALCIUM SERPL-MCNC: 9.2 MG/DL (ref 8.8–10)
CALCIUM SERPL-MCNC: 9.4 MG/DL (ref 8.8–10)
CALCIUM SERPL-MCNC: 9.4 MG/DL (ref 8.8–10)
CHLORIDE SERPL-SCNC: 96 MMOL/L (ref 98–107)
CHLORIDE SERPL-SCNC: 98 MMOL/L (ref 98–107)
CHLORIDE SERPL-SCNC: 98 MMOL/L (ref 98–107)
CHLORIDE SERPL-SCNC: 99 MMOL/L (ref 98–107)
CO2 SERPL-SCNC: 23 MMOL/L (ref 23–31)
CO2 SERPL-SCNC: 24 MMOL/L (ref 23–31)
CO2 SERPL-SCNC: 24 MMOL/L (ref 23–31)
CO2 SERPL-SCNC: 25 MMOL/L (ref 23–31)
CO2 SERPL-SCNC: 25 MMOL/L (ref 23–31)
CO2 SERPL-SCNC: 26 MMOL/L (ref 23–31)
CREAT SERPL-MCNC: 0.92 MG/DL (ref 0.72–1.25)
CREAT SERPL-MCNC: 0.97 MG/DL (ref 0.72–1.25)
CREAT SERPL-MCNC: 1.11 MG/DL (ref 0.72–1.25)
CREAT SERPL-MCNC: 1.12 MG/DL (ref 0.72–1.25)
CREAT SERPL-MCNC: 1.24 MG/DL (ref 0.72–1.25)
CREAT SERPL-MCNC: 1.38 MG/DL (ref 0.72–1.25)
CREAT/UREA NIT SERPL: 38
CREAT/UREA NIT SERPL: 38
CREAT/UREA NIT SERPL: 40
CREAT/UREA NIT SERPL: 41
CREAT/UREA NIT SERPL: 43
CREAT/UREA NIT SERPL: 44
EOSINOPHIL # BLD AUTO: 0 X10(3)/MCL (ref 0–0.9)
EOSINOPHIL NFR BLD AUTO: 0 %
ERYTHROCYTE [DISTWIDTH] IN BLOOD BY AUTOMATED COUNT: 25.9 % (ref 11.5–17)
GFR SERPLBLD CREATININE-BSD FMLA CKD-EPI: 56 ML/MIN/1.73/M2
GFR SERPLBLD CREATININE-BSD FMLA CKD-EPI: >60 ML/MIN/1.73/M2
GLUCOSE SERPL-MCNC: 139 MG/DL (ref 82–115)
GLUCOSE SERPL-MCNC: 156 MG/DL (ref 82–115)
GLUCOSE SERPL-MCNC: 158 MG/DL (ref 82–115)
GLUCOSE SERPL-MCNC: 160 MG/DL (ref 82–115)
GLUCOSE SERPL-MCNC: 175 MG/DL (ref 82–115)
GLUCOSE SERPL-MCNC: 99 MG/DL (ref 82–115)
HCT VFR BLD AUTO: 25.3 % (ref 42–52)
HGB BLD-MCNC: 8.3 G/DL (ref 14–18)
IMM GRANULOCYTES # BLD AUTO: 0.06 X10(3)/MCL (ref 0–0.04)
IMM GRANULOCYTES NFR BLD AUTO: 0.5 %
LYMPHOCYTES # BLD AUTO: 0.41 X10(3)/MCL (ref 0.6–4.6)
LYMPHOCYTES NFR BLD AUTO: 3.5 %
MAGNESIUM SERPL-MCNC: 1.7 MG/DL (ref 1.6–2.6)
MAGNESIUM SERPL-MCNC: 1.8 MG/DL (ref 1.6–2.6)
MAGNESIUM SERPL-MCNC: 1.8 MG/DL (ref 1.6–2.6)
MCH RBC QN AUTO: 25.1 PG (ref 27–31)
MCHC RBC AUTO-ENTMCNC: 32.8 G/DL (ref 33–36)
MCV RBC AUTO: 76.4 FL (ref 80–94)
MONOCYTES # BLD AUTO: 0.59 X10(3)/MCL (ref 0.1–1.3)
MONOCYTES NFR BLD AUTO: 5 %
NEUTROPHILS # BLD AUTO: 10.69 X10(3)/MCL (ref 2.1–9.2)
NEUTROPHILS NFR BLD AUTO: 90.9 %
NRBC BLD AUTO-RTO: 0 %
OHS QRS DURATION: 94 MS
OHS QTC CALCULATION: 479 MS
PHOSPHATE SERPL-MCNC: 3.3 MG/DL (ref 2.3–4.7)
PHOSPHATE SERPL-MCNC: 3.5 MG/DL (ref 2.3–4.7)
PHOSPHATE SERPL-MCNC: 3.6 MG/DL (ref 2.3–4.7)
PHOSPHATE SERPL-MCNC: 3.7 MG/DL (ref 2.3–4.7)
PHOSPHATE SERPL-MCNC: 3.8 MG/DL (ref 2.3–4.7)
PHOSPHATE SERPL-MCNC: 3.8 MG/DL (ref 2.3–4.7)
PLATELET # BLD AUTO: 164 X10(3)/MCL (ref 130–400)
PLATELETS.RETICULATED NFR BLD AUTO: 3.8 % (ref 0.9–11.2)
PMV BLD AUTO: ABNORMAL FL
POTASSIUM SERPL-SCNC: 3.1 MMOL/L (ref 3.5–5.1)
POTASSIUM SERPL-SCNC: 3.3 MMOL/L (ref 3.5–5.1)
POTASSIUM SERPL-SCNC: 3.3 MMOL/L (ref 3.5–5.1)
POTASSIUM SERPL-SCNC: 3.5 MMOL/L (ref 3.5–5.1)
POTASSIUM SERPL-SCNC: 3.5 MMOL/L (ref 3.5–5.1)
POTASSIUM SERPL-SCNC: 3.6 MMOL/L (ref 3.5–5.1)
RBC # BLD AUTO: 3.31 X10(6)/MCL (ref 4.7–6.1)
SODIUM SERPL-SCNC: 133 MMOL/L (ref 136–145)
SODIUM SERPL-SCNC: 133 MMOL/L (ref 136–145)
SODIUM SERPL-SCNC: 134 MMOL/L (ref 136–145)
SODIUM SERPL-SCNC: 137 MMOL/L (ref 136–145)
WBC # BLD AUTO: 11.76 X10(3)/MCL (ref 4.5–11.5)

## 2025-05-14 PROCEDURE — 93005 ELECTROCARDIOGRAM TRACING: CPT

## 2025-05-14 PROCEDURE — 97530 THERAPEUTIC ACTIVITIES: CPT

## 2025-05-14 PROCEDURE — 94761 N-INVAS EAR/PLS OXIMETRY MLT: CPT

## 2025-05-14 PROCEDURE — 20000000 HC ICU ROOM

## 2025-05-14 PROCEDURE — 94640 AIRWAY INHALATION TREATMENT: CPT

## 2025-05-14 PROCEDURE — 25000003 PHARM REV CODE 250

## 2025-05-14 PROCEDURE — 63600175 PHARM REV CODE 636 W HCPCS

## 2025-05-14 PROCEDURE — 80048 BASIC METABOLIC PNL TOTAL CA: CPT

## 2025-05-14 PROCEDURE — 99232 SBSQ HOSP IP/OBS MODERATE 35: CPT | Mod: GC,,, | Performed by: INTERNAL MEDICINE

## 2025-05-14 PROCEDURE — 84100 ASSAY OF PHOSPHORUS: CPT

## 2025-05-14 PROCEDURE — 25000242 PHARM REV CODE 250 ALT 637 W/ HCPCS

## 2025-05-14 PROCEDURE — 85025 COMPLETE CBC W/AUTO DIFF WBC: CPT

## 2025-05-14 PROCEDURE — 83735 ASSAY OF MAGNESIUM: CPT

## 2025-05-14 PROCEDURE — 27000207 HC ISOLATION

## 2025-05-14 PROCEDURE — 36415 COLL VENOUS BLD VENIPUNCTURE: CPT

## 2025-05-14 RX ORDER — POTASSIUM CHLORIDE 20 MEQ/1
40 TABLET, EXTENDED RELEASE ORAL 2 TIMES DAILY
Status: DISCONTINUED | OUTPATIENT
Start: 2025-05-14 | End: 2025-05-14

## 2025-05-14 RX ORDER — PANTOPRAZOLE SODIUM 40 MG/1
40 TABLET, DELAYED RELEASE ORAL DAILY
Status: DISCONTINUED | OUTPATIENT
Start: 2025-05-14 | End: 2025-05-20 | Stop reason: HOSPADM

## 2025-05-14 RX ORDER — POTASSIUM CHLORIDE 20 MEQ/1
40 TABLET, EXTENDED RELEASE ORAL 2 TIMES DAILY
Status: COMPLETED | OUTPATIENT
Start: 2025-05-14 | End: 2025-05-14

## 2025-05-14 RX ORDER — LANOLIN ALCOHOL/MO/W.PET/CERES
400 CREAM (GRAM) TOPICAL 3 TIMES DAILY
Status: COMPLETED | OUTPATIENT
Start: 2025-05-14 | End: 2025-05-14

## 2025-05-14 RX ORDER — LOPERAMIDE HYDROCHLORIDE 2 MG/1
4 CAPSULE ORAL ONCE
Status: COMPLETED | OUTPATIENT
Start: 2025-05-14 | End: 2025-05-14

## 2025-05-14 RX ORDER — METOPROLOL TARTRATE 25 MG/1
12.5 TABLET ORAL DAILY
Status: DISCONTINUED | OUTPATIENT
Start: 2025-05-14 | End: 2025-05-15

## 2025-05-14 RX ADMIN — POTASSIUM CHLORIDE 40 MEQ: 1500 TABLET, EXTENDED RELEASE ORAL at 09:05

## 2025-05-14 RX ADMIN — LEVALBUTEROL HYDROCHLORIDE 1.25 MG: 1.25 SOLUTION RESPIRATORY (INHALATION) at 07:05

## 2025-05-14 RX ADMIN — HYDROCORTISONE SODIUM SUCCINATE 50 MG: 100 INJECTION, POWDER, FOR SOLUTION INTRAMUSCULAR; INTRAVENOUS at 05:05

## 2025-05-14 RX ADMIN — RIVAROXABAN 20 MG: 10 TABLET, FILM COATED ORAL at 03:05

## 2025-05-14 RX ADMIN — Medication 1000 UNITS: at 08:05

## 2025-05-14 RX ADMIN — FOLIC ACID 1 MG: 1 TABLET ORAL at 08:05

## 2025-05-14 RX ADMIN — POTASSIUM CHLORIDE 40 MEQ: 1500 TABLET, EXTENDED RELEASE ORAL at 08:05

## 2025-05-14 RX ADMIN — Medication 400 MG: at 08:05

## 2025-05-14 RX ADMIN — HYDROCORTISONE SODIUM SUCCINATE 50 MG: 100 INJECTION, POWDER, FOR SOLUTION INTRAMUSCULAR; INTRAVENOUS at 09:05

## 2025-05-14 RX ADMIN — ATORVASTATIN CALCIUM 80 MG: 40 TABLET, FILM COATED ORAL at 08:05

## 2025-05-14 RX ADMIN — MELATONIN TAB 3 MG 6 MG: 3 TAB at 09:05

## 2025-05-14 RX ADMIN — IPRATROPIUM BROMIDE 0.5 MG: 0.5 SOLUTION RESPIRATORY (INHALATION) at 07:05

## 2025-05-14 RX ADMIN — METOPROLOL TARTRATE 12.5 MG: 25 TABLET, FILM COATED ORAL at 08:05

## 2025-05-14 RX ADMIN — Medication 400 MG: at 03:05

## 2025-05-14 RX ADMIN — DIPHENHYDRAMINE HYDROCHLORIDE 25 MG: 50 INJECTION INTRAMUSCULAR; INTRAVENOUS at 12:05

## 2025-05-14 RX ADMIN — FERROUS SULFATE TAB 325 MG (65 MG ELEMENTAL FE) 1 EACH: 325 (65 FE) TAB at 12:05

## 2025-05-14 RX ADMIN — PANTOPRAZOLE SODIUM 40 MG: 40 TABLET, DELAYED RELEASE ORAL at 08:05

## 2025-05-14 RX ADMIN — QUETIAPINE FUMARATE 100 MG: 100 TABLET ORAL at 09:05

## 2025-05-14 RX ADMIN — HYDROCORTISONE SODIUM SUCCINATE 50 MG: 100 INJECTION, POWDER, FOR SOLUTION INTRAMUSCULAR; INTRAVENOUS at 03:05

## 2025-05-14 RX ADMIN — LOPERAMIDE HYDROCHLORIDE 4 MG: 2 CAPSULE ORAL at 09:05

## 2025-05-14 RX ADMIN — Medication 400 MG: at 09:05

## 2025-05-14 RX ADMIN — ACETAMINOPHEN 650 MG: 325 TABLET, FILM COATED ORAL at 01:05

## 2025-05-14 RX ADMIN — CLOPIDOGREL BISULFATE 75 MG: 75 TABLET, FILM COATED ORAL at 08:05

## 2025-05-14 RX ADMIN — ACETAMINOPHEN 650 MG: 325 TABLET, FILM COATED ORAL at 09:05

## 2025-05-14 NOTE — PROGRESS NOTES
Cardiology Progress Note     Cardiology Attending: Dayanna Johnson MD  Cardiology Fellow: Vernon Cifuentes MD     Date of Admit: 4/21/2025    History of Present Illness:      Ran Reyes Jr. is a 69 y.o. male with NICM-HFrEF (LVEF 20-25%; G3DD), HTN, non obstructive CAD, AF on OAC, PAD, HLD, and COPD who presented with acute on chronic decompensated heart failure in cardiogenic shock. Cardiogenic shock is now resolved.     No acute events overnight. The patient is without acute complaint this morning. Denies symptoms from hypotension. Not tolerating GDMT. Labs stable without evidence of end organ dysfunction.       Past Medical History:     Past Medical History:   Diagnosis Date    A-fib     Anticoagulant long-term use     Anxiety     Aortic aneurysm     Cataract     CHF (congestive heart failure)     Chronic atrial fibrillation     COPD (chronic obstructive pulmonary disease)     Coronary artery disease     Depression     HLD (hyperlipidemia)     Hypertension     Mitral regurgitation     PAD (peripheral artery disease)     Primary open angle glaucoma (POAG)      Surgical History:     Past Surgical History:   Procedure Laterality Date    ANGIOGRAM, CORONARY, WITH LEFT HEART CATHETERIZATION N/A 03/26/2024    Procedure: Angiogram, Coronary, with Left Heart Cath;  Surgeon: Adriano Montiel MD;  Location: [facility] CATH LAB;  Service: Cardiology;  Laterality: N/A;    ATHERECTOMY OF PERIPHERAL VESSEL Left 09/12/2022    LEFT SFA ATHERECTOMY, BALLOON ANGIOPLASTY    CATARACT EXTRACTION W/  INTRAOCULAR LENS IMPLANT Left 03/09/2023    Procedure: EXTRACTION, CATARACT, WITH IOL INSERTION;  Surgeon: Georgi Borja MD;  Location: [facility] OR;  Service: Ophthalmology;  Laterality: Left;  19.5  mac    EGD, WITH CLOSED BIOPSY  03/22/2024    Procedure: EGD, WITH CLOSED BIOPSY;  Surgeon: Ronald Calderon MD;  Location: [facility] ENDOSCOPY;  Service: Gastroenterology;;    ESOPHAGOGASTRODUODENOSCOPY N/A 03/22/2024    Procedure: EGD;   Surgeon: Ronald Calderon MD;  Location: [facility] ENDOSCOPY;  Service: Gastroenterology;  Laterality: N/A;    Heart Stent N/A     > 10yrs. AGO    INCISION AND DRAINAGE N/A 03/18/2024    Procedure: Incision and Drainage;  Surgeon: Fahad Rivas MD;  Location: [facility] OR;  Service: Urology;  Laterality: N/A;  I&D SCROTAL ABSCESS    INSERTION OF STENT INTO PERIPHERAL VESSEL Right 10/17/2022    RIGHT SFA ATHERECTOMY, BALLOON ANGIOPLASTY, STENT; RIGHT ANTERIOR TIBIAL ARTERY ATHERECTOMY, BALLOON ANGIOPLASTY    ORCHIECTOMY Left 03/18/2024    Procedure: ORCHIECTOMY;  Surgeon: Fahad Rivas MD;  Location: [facility] OR;  Service: Urology;  Laterality: Left;     Allergies:   Review of patient's allergies indicates:  No Known Allergies  Family History:     Family History   Problem Relation Name Age of Onset    Cancer Mother      Hypertension Mother      Heart failure Father      Stroke Father      Hypertension Father      No Known Problems Sister       Social History:   Social History[1]  Review of Systems:     The patient denies headaches, changes in vision, nausea, emesis, fevers, chills, chest pain, dyspnea, palpitations, abdominal pain, or changes in urinary or bowel habits.     Medications:     Home Medications:  Prior to Admission medications    Medication Sig Start Date End Date Taking? Authorizing Provider   aspirin (ECOTRIN) 81 MG EC tablet Take 1 tablet by mouth once daily. 3/14/25  Yes Provider, Historical   olopatadine (PATANOL) 0.1 % ophthalmic solution Apply to eye. 2/7/25  Yes Provider, Historical   sacubitriL-valsartan (ENTRESTO) 49-51 mg per tablet Take 1 tablet by mouth once daily. 2/12/25  Yes Provider, Historical   urea (CARMOL) 40 % Crea Apply topically. 2/13/25  Yes Provider, Historical   varenicline tartrate (CHANTIX) 1 mg Tab Take 0.5 mg by mouth. 2/13/25  Yes Provider, Historical   acetaminophen 325 mg Cap Take 650 mg by mouth 2 (two) times daily as needed.    Provider, Historical    ALBUTEROL INHL Inhale 2 puffs into the lungs every 6 (six) hours as needed.    Provider, Historical   albuterol-ipratropium (DUO-NEB) 2.5 mg-0.5 mg/3 mL nebulizer solution Take 3 mLs by nebulization every 6 (six) hours as needed for Wheezing. Rescue 12/9/24   Diana Hassan MD   atorvastatin (LIPITOR) 80 MG tablet Take 1 tablet (80 mg total) by mouth once daily. 3/4/25 3/4/26  Marino Marquez DO   BREZTRI AEROSPHERE 160-9-4.8 mcg/actuation HFAA Inhale 2 puffs into the lungs 2 (two) times daily. 1/7/25   Provider, Historical   cetirizine (ZYRTEC) 10 MG tablet Take 1 tablet (10 mg total) by mouth once daily. 8/8/24 8/3/25  Abiodun Recinos DO   clopidogreL (PLAVIX) 75 mg tablet Take 1 tablet (75 mg total) by mouth once daily. 3/4/25   Marino Marquez DO   folic acid (FOLVITE) 1 MG tablet Take 1 tablet (1 mg total) by mouth once daily. 4/9/24 4/9/25  Tha Edmond MD   furosemide (LASIX) 40 MG tablet Take 1 tablet (40 mg total) by mouth 2 (two) times a day. 3/4/25   Marino Marquez DO   gabapentin (NEURONTIN) 300 MG capsule Take 300 mg by mouth 3 (three) times daily.    Provider, Historical   metoprolol succinate (TOPROL-XL) 25 MG 24 hr tablet Take 0.5 tablets (12.5 mg total) by mouth once daily. 3/4/25 3/4/26  Marino Marquez DO   nicotine (NICODERM CQ) 14 mg/24 hr Place 1 patch onto the skin daily as needed. 3/4/25   Marino Marquez DO   nicotine (NICODERM CQ) 21 mg/24 hr Place 1 patch onto the skin once daily. 3/20/25   Heaven Page MD   nitrofurantoin, macrocrystal-monohydrate, (MACROBID) 100 MG capsule Take 1 capsule (100 mg total) by mouth 2 (two) times daily. 3/4/25   Marino Marquez DO   pantoprazole (PROTONIX) 40 MG tablet Take 1 tablet (40 mg total) by mouth 2 (two) times a day. 9/23/24   Abiodun Recinos DO   potassium chloride SA (K-DUR,KLOR-CON) 20 MEQ tablet Take 1 tablet (20 mEq total) by mouth 2 (two) times daily. 1/28/25 1/28/26  Vernon Cifuentes MD   rivaroxaban (XARELTO) 20 mg Tab Take 1 tablet (20 mg  total) by mouth Daily. 3/4/25   Marino Marquez DO   sertraline (ZOLOFT) 100 MG tablet Take 100 mg by mouth once daily.    Provider, Historical   vitamin D (VITAMIN D3) 1000 units Tab Take 1,000 Units by mouth once daily.    Provider, Historical       Current/Inpatient Medications:  Infusions:   0.9% NaCl   Intravenous Continuous   Stopped at 05/04/25 5777     Scheduled:   atorvastatin  80 mg Oral Daily    clopidogreL  75 mg Oral Daily    ferrous sulfate  1 tablet Oral Q48H    folic acid  1 mg Oral Daily    hydrocortisone sodium succinate  50 mg Intravenous Q8H    ipratropium  0.5 mg Nebulization Q12H    levalbuterol  1.25 mg Nebulization Q12H    miconazole NITRATE 2 %   Topical (Top) BID    pantoprazole  40 mg Oral BID    QUEtiapine  100 mg Oral QHS    rivaroxaban  20 mg Oral Daily    vitamin D  1,000 Units Oral Daily     PRN:    Current Facility-Administered Medications:     acetaminophen, 650 mg, Oral, Q4H PRN    aluminum-magnesium hydroxide, 30 mL, Oral, BID PRN    dextrose 50%, 12.5 g, Intravenous, PRN    dextrose 50%, 25 g, Intravenous, PRN    diphenhydrAMINE, 25 mg, Intravenous, Q6H PRN    glucagon (human recombinant), 1 mg, Intramuscular, PRN    glucose, 16 g, Oral, PRN    glucose, 24 g, Oral, PRN    guaiFENesin 100 mg/5 ml, 200 mg, Oral, Q4H PRN    hydrALAZINE, 10 mg, Intravenous, Q4H PRN    hydrocortisone, , Topical (Top), TID PRN    insulin aspart U-100, 0-5 Units, Subcutaneous, QID (AC + HS) PRN    lorazepam, 1 mg, Intravenous, Q6H PRN    melatonin, 6 mg, Oral, Nightly PRN    naloxone, 0.02 mg, Intravenous, PRN    nicotine, 1 patch, Transdermal, Daily PRN    polyethylene glycol, 17 g, Oral, Daily PRN    prochlorperazine, 5 mg, Intravenous, Q6H PRN    senna-docusate, 1 tablet, Oral, BID PRN    simethicone, 1 tablet, Oral, TID PRN    sodium chloride 0.9%, 10 mL, Intravenous, PRN    traZODone, 25 mg, Oral, Nightly PRN    Objective:     24-hour Vitals:   Temp:  [98 °F (36.7 °C)-98.3 °F (36.8 °C)] 98 °F (36.7  °C)  Pulse:  [65-91] 76  Resp:  [10-27] 17  SpO2:  [92 %-100 %] 94 %  BP: ()/(50-80) 86/54    Vitals: BP (!) 86/54   Pulse 76   Temp 98 °F (36.7 °C)   Resp 17   Ht 6' (1.829 m)   Wt 85 kg (187 lb 6.3 oz)   SpO2 (!) 94%   BMI 25.41 kg/m²     Intake/Output Summary (Last 24 hours) at 5/14/2025 0717  Last data filed at 5/13/2025 1600  Gross per 24 hour   Intake 740 ml   Output 500 ml   Net 240 ml       Physical Exam  Vitals reviewed.   Constitutional:       General: He is not in acute distress.     Appearance: Normal appearance. He is normal weight. He is not ill-appearing.   HENT:      Head: Normocephalic and atraumatic.      Right Ear: External ear normal.      Left Ear: External ear normal.      Nose: Nose normal.   Eyes:      Conjunctiva/sclera: Conjunctivae normal.   Cardiovascular:      Rate and Rhythm: Normal rate. Rhythm irregular.      Pulses: Normal pulses.      Heart sounds: Murmur heard.   Pulmonary:      Effort: Pulmonary effort is normal. No respiratory distress.      Breath sounds: No stridor. No wheezing, rhonchi or rales.   Chest:      Chest wall: No tenderness.   Abdominal:      General: Bowel sounds are normal. There is no distension.      Palpations: Abdomen is soft.   Musculoskeletal:      Cervical back: Neck supple.      Right lower leg: Edema present.      Left lower leg: Edema present.      Comments: Trace LE edema.    Skin:     General: Skin is warm and dry.      Capillary Refill: Capillary refill takes less than 2 seconds.   Neurological:      Mental Status: He is alert and oriented to person, place, and time.   Psychiatric:         Mood and Affect: Mood normal.         Behavior: Behavior normal.        Labs:   I have reviewed the following labs below:    Recent Labs   Lab 05/08/25  0759 05/08/25  1233 05/12/25  0356 05/12/25  0828 05/13/25  0253 05/13/25  1234 05/13/25 2027 05/14/25  0003 05/14/25  0329   WBC  --    < > 9.65  --  8.38  --   --   --  11.76*   HGB  --    < > 8.0*   --  7.8*  --   --   --  8.3*   HCT  --    < > 24.7*  --  24.6*  --   --   --  25.3*   PLT  --    < > 154  --  153  --   --   --  164   *   < > 134*   < > 133*   < > 135* 133* 133*   K 2.7*   < > 3.5   < > 4.3   < > 4.1 3.5 3.3*   CL 78*   < > 93*   < > 96*   < > 97* 98 99   CO2 38*   < > 28   < > 25   < > 26 25 24   BUN 57.5*   < > 49.4*   < > 42.3*   < > 48.6* 46.4* 42.8*   CREATININE 1.50*   < > 1.09   < > 1.06   < > 1.13 1.12 0.97   *   < > 141*   < > 183*   < > 135* 160* 158*   CALCIUM 9.3   < > 10.3*   < > 9.5   < > 9.5 9.2 9.2   MG 2.00   < > 2.00   < > 1.80  1.90   < > 1.90 1.70 1.80   PHOS 3.8   < > 3.8   < > 3.3  3.3   < > 3.3 3.3 3.6   PROT  --   --   --   --  7.2  --   --   --   --    ALBUMIN  --   --   --   --  3.1*  --   --   --   --    BILITOT  --   --   --   --  4.6*  --   --   --   --    AST  --   --   --   --  49*  --   --   --   --    ALT  --   --   --   --  48  --   --   --   --    ALKPHOS  --   --   --   --  246*  --   --   --   --    TROPONINI  --   --   --   --  0.018  --   --   --   --    BNP 3,765.7*  --   --   --   --   --   --   --   --     < > = values in this interval not displayed.     Cardiovascular Studies/Imaging:   I have reviewed the following studies below:  TTE:   Summary  Show Result Comparison     Left Ventricle: The left ventricle is severely dilated. Severely increased ventricular mass. Mildly increased wall thickness. There is severe eccentric hypertrophy. Severe global hypokinesis present. There is severely reduced systolic function. Quantitated ejection fraction is 22%. Grade III diastolic dysfunction.    Right Ventricle: The right ventricle has moderate enlargement. Systolic function is moderately reduced.    Left Atrium: Left atrium is severely dilated measuring 92ml/m2.    Right Atrium: Right atrium is severely dilated.    Aortic Valve: The aortic valve is a trileaflet valve. There is mild aortic valve sclerosis. There is no stenosis. There is no  significant regurgitation.    Mitral Valve: The mitral valve is structurally normal. There is no stenosis. There is severe central regurgitation.    Tricuspid Valve: The tricuspid valve is structurally normal. There is moderate regurgitation.    Pulmonary Artery: There is moderate pulmonary hypertension. The estimated pulmonary artery systolic pressure is 59 mmHg.    IVC/SVC: Elevated venous pressure at 15 mmHg.    Pericardium: There is no pericardial effusion.    LHC/Cors: 3/26/2024  Coronary findings:     Dominance: right   Left main:  10-20% calcified distal left main disease  Left anterior descending artery:  50% calcified proximal stenosis  Circumflex artery:  Luminal irregularities  Right coronary artery:  Luminal irregularities    Imaging:   See imaging section for details.     Assessment:     Ran Reyes Jr. is a 69 y.o. male with NICM-HFrEF (LVEF 20-25%; G3DD), HTN, non obstructive CAD, AF on OAC, PAD, HLD, and COPD who presented with acute on chronic decompensated heart failure in cardiogenic shock now resolved.     Plan/Recommendations:   Acute on Chronic NICM-HFrEF with Resolved CS and Severe Mitral Regurgitation   -Likely will be unable to tolerate GDMT.   -Lopressor 12.5 mg and assess hemodynamic and HR response if able to tolerate would consider transition to Toprol XL.   -In future we can try Entresto 1/2 tablet 24-26 mg BID hold parameter for SBP < 100 mmHg.   -No SGLT2i (has history of Claudine's). Hold MRA.   -Close monitoring of electrolytes. Replace as clinically warranted.   -Advanced HF evaluation outpatient.   -Have new LifeVest placed prior to discharged.     Atrial Fibrillation  -Lopressor 12.5 mg and assess hemodynamic and HR response if able to tolerate would consider transition to Toprol XL.   -Continue anticoagulation for CVA risk reduction.     Hypertension  -ARNi as above.      Non Obstructive Epicardial Coronary Artery Disease  Peripheral Artery Disease  -SAPT and high intensity  statin therapy.     Vernon Cifuentes MD  [facility] Chief Cardiology Fellow, PGY-6  [facility]                [1]   Social History  Tobacco Use    Smoking status: Every Day     Current packs/day: 0.50     Average packs/day: 0.8 packs/day for 50.4 years (40.9 ttl pk-yrs)     Types: Cigarettes     Start date: 1975     Passive exposure: Current    Smokeless tobacco: Never    Tobacco comments:     States smoke 2-3 cigarettes per day   Substance Use Topics    Alcohol use: Yes     Alcohol/week: 3.0 standard drinks of alcohol     Types: 3 Cans of beer per week     Comment: socially    Drug use: Not Currently     Frequency: 1.0 times per week     Types: Marijuana     Comment: no use in over 1 year

## 2025-05-14 NOTE — PT/OT/SLP PROGRESS
Occupational Therapy   Treatment    Name: Ran Reyes Jr.  MRN: [id number]  Admitting Diagnosis:       1. ELIZABETH (acute kidney injury)    2. Weakness    3. Dyspnea    4. Screening due    5. HFrEF (heart failure with reduced ejection fraction)    6. Acidemia    7. Microcytic anemia    8. Tobacco dependency    9. Acute on chronic combined systolic and diastolic congestive heart failure    10. Bradycardia    11. QT prolongation    12. Heart failure with reduced ejection fraction    13. Heart failure    14. At risk for long QT syndrome    15. Chest pain    16. Acute combined systolic and diastolic congestive heart failure    17. Nonrheumatic mitral valve regurgitation    18. Syncope    Problem List[1]   Recommendations:     Discharge Recommendations: Moderate Intensity Therapy  Discharge Equipment Recommendations:  bath bench  Barriers to discharge:  Decreased caregiver support    Assessment:     Ran Reyes Jr. is a 69 y.o. male with a medical diagnosis of see above .  He  presents willing and motivated  to participate however hypotensive upon sit to stand x 2 trials and with c/o dizziness and thus session limited .     Performance deficits affecting function are weakness, impaired endurance, impaired self care skills, impaired functional mobility, gait instability, impaired balance, decreased safety awareness, impaired cardiopulmonary response to activity.     Rehab Prognosis:  Fair; patient would benefit from acute skilled OT services to address these deficits and reach maximum level of function.       Plan:     Patient to be seen 3 x/week to address the above listed problems via self-care/home management, therapeutic activities, therapeutic exercises  Plan of Care Expires: 04/28/25  Plan of Care Reviewed with: patient    Subjective     Chief Complaint: dizziness  Patient/Family Comments/goals: get stronger   Pain/Comfort:  Pain Rating 1: 0/10  Pain Addressed 1: Nurse notified  Pain Rating Post-Intervention 1:  0/10    Objective:     Communicated with: Nurse Kaur prior to session.  Patient found up in chair and had been sitting up ~ 30 min and with blood pressure cuff, booth catheter, PICC line, pulse ox (continuous), telemetry, Other (comments) (life vest) upon OT entry to room.     General Precautions: Standard, fall, contact    Orthopedic Precautions:N/A  Braces: N/A  Respiratory Status: Room air    VITALS  Seated in chair   Standing  Standing after seated rest End of session(seated )   BP 67/46   65/50  73/44    58/46  MAP 58   55  61    50  Note: Per nurse Kaur - goal for mean  arterial pressure (MAP) is 60 .     Pt with c/o of dizziness x 2 attempts at standing with RW      Functional Mobility/Transfers:  Patient completed Sit <> Stand Transfer with contact guard assistance  with  rolling walker and x 2 trials    Functional Mobility: unable due to hypotension and dizziness     Treatment & Education:  Pt. educated on OT goals, POC, orientation to environment, use of call bell for assist with transfers OOB or for any other needs due to fall risk.     Patient left up in chair with all lines intact, call button in reach, and nurse  notified    GOALS:   Multidisciplinary Problems       Occupational Therapy Goals          Problem: Occupational Therapy    Goal Priority Disciplines Outcome Interventions   Occupational Therapy Goal     OT, PT/OT Progressing    Description: Pt will ambulate to bathroom with rollator SBA  Pt will complete toilet t/f SBA  Pt will complete toileting tasks SBA  Pt will complete UE/LE dressing mod I  Pt will increase standing endurance x 5 minutes.                        DME Justifications:  No DME recommended requiring DME justifications    Time Tracking:     OT Date of Treatment: 05/14/25  OT Start Time: 1349  OT Stop Time: 1402  OT Total Time (min): 13 min    Billable Minutes:Therapeutic Activity 13 min    OT/ALLAN: OT          5/14/2025       [1]   Patient Active Problem List  Diagnosis     Primary open angle glaucoma (POAG) of right eye, moderate stage    Combined forms of age-related cataract of left eye    Arteriosclerosis of coronary artery    Chronic atrial fibrillation    Dyslipidemia    Hypertension    Tobacco use    PVD (peripheral vascular disease)    VHD (valvular heart disease)    COVID-19    HFrEF (heart failure with reduced ejection fraction)    Nonrheumatic mitral valve regurgitation    Postoperative eye state    Scrotal hematoma    Chronic obstructive pulmonary disease, unspecified    Lesion of external ear    Low back pain    Other thrombophilia    Secondary hyperaldosteronism    Positive colorectal cancer screening using Cologuard test    Acute heart failure    History of COPD    CHF (congestive heart failure)    Acute cystitis with hematuria    Tobacco dependency    Moderate malnutrition

## 2025-05-14 NOTE — PROGRESS NOTES
[facility] Surg Telemetry  Pulmonary Critical Care Note    Patient Name: Ran Reyes Jr.  MRN: [id number]  Admission Date: 4/21/2025  Hospital Length of Stay: 22 days  Code Status: Full Code  Attending Provider: Osvaldo Lynne MD  Primary Care Provider: Abiodun Recinos DO     Subjective:     HPI:   Ran Reyes Jr. is a 69 y.o. 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 Patient reports he can only ambulate about 20-30 feet before having to stop due to claudication pain which is chronic and unchanged compared to baseline. Since running out of his diuretic medications approximately 5 days ago he has noted progressively worsening lower extremity swelling causing leg heaviness and weakness exacerbating difficulty ambulating.  He also notes acute on chronic cough productive of a brownish sputum without hemoptysis.  Denies fever, chills, sweats.  Denies chest pain.  Endorses shortness of breath at rest and with ambulation but unchanged compared to baseline.  Denies abdominal pain, nausea, vomiting, constipation, diarrhea.  Denies increased or decreased urinary frequency, dysuria, or hematuria.  Sleeps with 2 pillows at night due to baseline orthopnea. Has not had to increase number of pillows or change sleeping habits. Wears 2L NC home oxygen at baseline for hx of COPD. Denies having increased oxygen requirements prior to ED presentation.     ED course:  Vital signs stable.  Oxygenation  stable on 2 L nasal cannula.  CBC shows microcytic anemic (HGB 9.0; MCV 77).  CMP shows hyponatremia (Na 128), acidemia (CO2 14), elevated renal indices (BUN 25; creatinine 1.93), elevated alkaline phosphatase (). Troponin WNL.  BNP 26 39.5.  UDS negative.  EKG showed atrial fibrillation with PVCs.  Chest x-ray difficult to interpret due to overlying medical devices however appears to show cardiomegaly with bilateral pulmonary vascular congestion with question of bilateral pleural effusions. He was admitted for acute CHF exacerbation and cardiorenal syndrome.      Hospital Course/Significant events:  4/23/25: Admit to ICU, started on pressors and inotropes for cardiogenic / septic shock   5/2/25: Downgraded to    5/3/25: Re-upgraded to ICU 2/2 worsening lactate and agonal breathing  5/13/25: Weaned off dobutamine    24 Hour Interval History:  NAEON. Vitals show patient BP at goal relative to patient EF. Remaining vitals stable. He remains off dobutamine gtt at this time. Denies any acute complaints today. Denies any further episodes of lightheadedness, dizziness, and/or syncope. CBC showed mild leukocytosis and stable microcytic anemia.  CMP showed mild hyponatremia, mild hypokalemia, and mild hypomagnesemia.     Past Medical History:   Diagnosis Date    A-fib     Anticoagulant long-term use     Anxiety     Aortic aneurysm     Cataract     CHF (congestive heart failure)     Chronic atrial fibrillation     COPD (chronic obstructive pulmonary disease)     Coronary artery disease     Depression     HLD (hyperlipidemia)     Hypertension     Mitral regurgitation     PAD (peripheral artery disease)     Primary open angle glaucoma (POAG)        Past Surgical History:   Procedure Laterality Date    ANGIOGRAM, CORONARY, WITH LEFT HEART CATHETERIZATION N/A 03/26/2024    Procedure: Angiogram, Coronary, with Left Heart Cath;  Surgeon: Adriano Montiel MD;  Location: [facility] CATH LAB;  Service: Cardiology;  Laterality:  N/A;    ATHERECTOMY OF PERIPHERAL VESSEL Left 09/12/2022    LEFT SFA ATHERECTOMY, BALLOON ANGIOPLASTY    CATARACT EXTRACTION W/  INTRAOCULAR LENS IMPLANT Left 03/09/2023    Procedure: EXTRACTION, CATARACT, WITH IOL INSERTION;  Surgeon: Georgi Borja MD;  Location: [facility];  Service: Ophthalmology;  Laterality: Left;  19.5  mac    EGD, WITH CLOSED BIOPSY  03/22/2024    Procedure: EGD, WITH CLOSED BIOPSY;  Surgeon: Ronald Calderon MD;  Location: [facility] ENDOSCOPY;  Service: Gastroenterology;;    ESOPHAGOGASTRODUODENOSCOPY N/A 03/22/2024    Procedure: EGD;  Surgeon: Ronald Calderon MD;  Location: [facility] ENDOSCOPY;  Service: Gastroenterology;  Laterality: N/A;    Heart Stent N/A     > 10yrs. AGO    INCISION AND DRAINAGE N/A 03/18/2024    Procedure: Incision and Drainage;  Surgeon: Fahad Rivas MD;  Location: [facility];  Service: Urology;  Laterality: N/A;  I&D SCROTAL ABSCESS    INSERTION OF STENT INTO PERIPHERAL VESSEL Right 10/17/2022    RIGHT SFA ATHERECTOMY, BALLOON ANGIOPLASTY, STENT; RIGHT ANTERIOR TIBIAL ARTERY ATHERECTOMY, BALLOON ANGIOPLASTY    ORCHIECTOMY Left 03/18/2024    Procedure: ORCHIECTOMY;  Surgeon: Fahad Rivas MD;  Location: [facility];  Service: Urology;  Laterality: Left;       Social History[1]    Current Outpatient Medications   Medication Instructions    acetaminophen 650 mg, 2 times daily PRN    ALBUTEROL INHL 2 puffs, Every 6 hours PRN    albuterol-ipratropium (DUO-NEB) 2.5 mg-0.5 mg/3 mL nebulizer solution 3 mLs, Nebulization, Every 6 hours PRN, Rescue    aspirin (ECOTRIN) 81 MG EC tablet 1 tablet, Daily    atorvastatin (LIPITOR) 80 mg, Oral, Daily    BREZTRI AEROSPHERE 160-9-4.8 mcg/actuation HFAA 2 puffs, 2 times daily    cetirizine (ZYRTEC) 10 mg, Oral, Daily    clopidogreL (PLAVIX) 75 mg, Oral, Daily    folic acid (FOLVITE) 1 mg, Oral, Daily    furosemide (LASIX) 40 mg, Oral, 2 times daily    gabapentin (NEURONTIN) 300 mg, Oral, 3 times daily     metoprolol succinate (TOPROL-XL) 12.5 mg, Oral, Daily    nicotine (NICODERM CQ) 14 mg/24 hr 1 patch, Transdermal, Daily PRN    nicotine (NICODERM CQ) 21 mg/24 hr 1 patch, Transdermal, Daily    nitrofurantoin, macrocrystal-monohydrate, (MACROBID) 100 MG capsule 100 mg, Oral, 2 times daily    olopatadine (PATANOL) 0.1 % ophthalmic solution Apply to eye.    pantoprazole (PROTONIX) 40 mg, Oral, 2 times daily    potassium chloride SA (K-DUR,KLOR-CON) 20 MEQ tablet 20 mEq, Oral, 2 times daily    rivaroxaban (XARELTO) 20 mg, Oral, Daily    sacubitriL-valsartan (ENTRESTO) 49-51 mg per tablet 1 tablet, Daily    sertraline (ZOLOFT) 100 mg, Oral, Daily    urea (CARMOL) 40 % Crea Apply topically.    varenicline tartrate (CHANTIX) 0.5 mg    vitamin D (VITAMIN D3) 1,000 Units, Daily     Current Inpatient Medications   atorvastatin  80 mg Oral Daily    clopidogreL  75 mg Oral Daily    ferrous sulfate  1 tablet Oral Q48H    folic acid  1 mg Oral Daily    hydrocortisone sodium succinate  50 mg Intravenous Q8H    ipratropium  0.5 mg Nebulization Q12H    levalbuterol  1.25 mg Nebulization Q12H    metoprolol tartrate  12.5 mg Oral Daily    miconazole NITRATE 2 %   Topical (Top) BID    pantoprazole  40 mg Oral BID    QUEtiapine  100 mg Oral QHS    rivaroxaban  20 mg Oral Daily    vitamin D  1,000 Units Oral Daily     Current Intravenous Infusions   0.9% NaCl   Intravenous Continuous   Stopped at 05/04/25 2404     ROS:   Review of Systems   Constitutional:  Negative for chills, diaphoresis, fever and malaise/fatigue.   HENT:  Negative for sore throat and tinnitus.    Eyes:  Negative for pain, discharge and redness.   Respiratory:  Negative for cough, hemoptysis, sputum production, shortness of breath and wheezing.    Cardiovascular:  Positive for leg swelling. Negative for chest pain, palpitations, orthopnea and claudication.   Gastrointestinal:  Negative for abdominal pain, blood in stool, constipation, diarrhea, melena, nausea and  vomiting.   Genitourinary:  Negative for dysuria, flank pain, frequency, hematuria and urgency.   Musculoskeletal:  Negative for joint pain and myalgias.   Neurological:  Negative for weakness and headaches.           Objective:     Intake/Output Summary (Last 24 hours) at 5/14/2025 0755  Last data filed at 5/14/2025 0745  Gross per 24 hour   Intake 740 ml   Output 1500 ml   Net -760 ml     Vital Signs (Most Recent):  Temp: 98 °F (36.7 °C) (05/14/25 0346)  Pulse: 76 (05/14/25 0708)  Resp: 17 (05/14/25 0708)  BP: (!) 86/66 (05/14/25 0701)  SpO2: (!) 94 % (05/14/25 0708)  Body mass index is 25.41 kg/m².  Weight: 85 kg (187 lb 6.3 oz) Vital Signs (24h Range):  Temp:  [98 °F (36.7 °C)-98.3 °F (36.8 °C)] 98 °F (36.7 °C)  Pulse:  [65-91] 76  Resp:  [10-27] 17  SpO2:  [92 %-100 %] 94 %  BP: ()/(50-80) 86/66     Physical Exam:  Physical Exam  Constitutional:       General: He is not in acute distress.     Appearance: Normal appearance. He is not ill-appearing.   HENT:      Head: Normocephalic and atraumatic.   Eyes:      General: No scleral icterus.        Right eye: No discharge.         Left eye: No discharge.      Extraocular Movements: Extraocular movements intact.      Conjunctiva/sclera: Conjunctivae normal.      Pupils: Pupils are equal, round, and reactive to light.   Cardiovascular:      Rate and Rhythm: Normal rate. Rhythm irregular.      Pulses: Normal pulses.      Heart sounds: Murmur heard.      No friction rub. No gallop.   Pulmonary:      Effort: Pulmonary effort is normal. No respiratory distress.      Breath sounds: Normal breath sounds. No stridor. No wheezing, rhonchi or rales.   Abdominal:      General: Abdomen is flat. Bowel sounds are normal. There is no distension.      Palpations: Abdomen is soft.      Tenderness: There is no abdominal tenderness. There is no guarding.   Musculoskeletal:         General: Swelling present. Normal range of motion.      Right lower leg: Edema present.      Left  lower leg: Edema present.      Comments: 1+ pitting edema to BLE   Skin:     General: Skin is warm and dry.   Neurological:      General: No focal deficit present.      Mental Status: He is alert and oriented to person, place, and time.           Lines/Drains/Airways       Peripherally Inserted Central Catheter Line  Duration             PICC Triple Lumen 04/24/25 0942 right brachial 19 days              Drain  Duration                  Urethral Catheter 05/05/25 0808 Silicone 16 Fr. 8 days                  Significant Labs:  Lab Results   Component Value Date    WBC 11.76 (H) 05/14/2025    HGB 8.3 (L) 05/14/2025    HCT 25.3 (L) 05/14/2025    MCV 76.4 (L) 05/14/2025     05/14/2025       BMP  Lab Results   Component Value Date     (L) 05/14/2025    K 3.3 (L) 05/14/2025    CO2 24 05/14/2025    BUN 42.8 (H) 05/14/2025    CREATININE 0.97 05/14/2025    CALCIUM 9.2 05/14/2025    AGAP 10.0 05/14/2025    EGFRNONAA 88 (L) 12/06/2021     ABG  Recent Labs   Lab 05/08/25  1341   PH 7.610*   PO2 72.0*   PCO2 47.0*   HCO3 47.2*     Mechanical Ventilation Support:  Oxygen Concentration (%): 28 (05/12/25 0044)    Significant Imaging:  I have reviewed the pertinent imaging within the past 24 hours.    Assessment/Plan:     Assessment  Cardiogenic Shock  HFmrEF with pulmonary hypertension  TTE showed 22% with PASP 59 mm Hg and grade 3 diastolic dysfunction  NOCAD, NICMO  NSTEMI likely demand  Chronic persistent Afib  Hx of VT arrest (has life vest)  Cardiorenal syndrome (improved)   Adrenal Insufficiency   Low cortisol on ata stim test and normal plast ACTH level  MRI brain to be done inpatient vs outpatient  COPD   Microcytic Anemia   Low TSAT 7%  Prolonged QTC  Groin itch  Anxiety/Insomnia  Hypokalemia     Plan  Continue ongoing ICU level of care  Cardiology following; appreciate assistance  Remains off dobutamine today  Initiating lopressor 12.5 mg qd today  Holding entresto 24-26 mg b.i.d. and spironolactone 25 mg  q.d. due to BP  Holding diuretics as patient has adequate urine output without them at this time  SGLT2 contraindicated due to history of Claudine's gangrene  Maintain K>4, Phos > 3 and Mg > 2  Long term plan is patient will need outpatient referral to advanced heart specialists in [place] for evaluation for LVAD versus heart transplant  Discussed with cardiology and will set MAP > 60 as new MAP goal given patient severely reduced EF unless patient becomes symptomatic from hypotension or develops end organ damage  No indication for levophed at this time but if patient BP is persistently below goal or patient becomes symptomatic then can start levophed for bp support  Continue antiplatelet monotherapy with plavix   Strict I&O and daily weights  Continue IV Hydrocortisone 50 mg q8h due to persistent hypotension previously  LDSSI   Continue Miconazole powder BID   Continue remaining medications:  Levalbuterol and ipratropium q.12 hours, Atorvastatin 80 mg, quetiapine 100 mg q.h.s., q.d., ferrous sulfate 325 mg q.48h, folic acid 1 mg a day    DVT Prophylaxis: Xarelto 20 mg  GI Prophylaxis: PPI     32 minutes of critical care was time spent personally by me on the following activities: development of treatment plan with patient or surrogate and bedside caregivers, discussions with consultants, evaluation of patient's response to treatment, examination of patient, ordering and performing treatments and interventions, ordering and review of laboratory studies, ordering and review of radiographic studies, pulse oximetry, re-evaluation of patient's condition.  This critical care time did not overlap with that of any other provider or involve time for any procedures.     Sagar Napoles MD  [facility] -II  Pulmonary Critical Care Medicine  [facility] Tele[facility]       [1]   Social History  Socioeconomic History    Marital status: Single   Tobacco Use    Smoking status: Every Day      Current packs/day: 0.50     Average packs/day: 0.8 packs/day for 50.4 years (40.9 ttl pk-yrs)     Types: Cigarettes     Start date: 1975     Passive exposure: Current    Smokeless tobacco: Never    Tobacco comments:     States smoke 2-3 cigarettes per day   Substance and Sexual Activity    Alcohol use: Yes     Alcohol/week: 3.0 standard drinks of alcohol     Types: 3 Cans of beer per week     Comment: socially    Drug use: Not Currently     Frequency: 1.0 times per week     Types: Marijuana     Comment: no use in over 1 year    Sexual activity: Not Currently     Partners: Female     Social Drivers of Health     Financial Resource Strain: Low Risk  (4/23/2025)    Overall Financial Resource Strain (CARDIA)     Difficulty of Paying Living Expenses: Not hard at all   Food Insecurity: No Food Insecurity (4/23/2025)    Hunger Vital Sign     Worried About Running Out of Food in the Last Year: Never true     Ran Out of Food in the Last Year: Never true   Transportation Needs: No Transportation Needs (4/23/2025)    PRAPARE - Transportation     Lack of Transportation (Medical): No     Lack of Transportation (Non-Medical): No   Physical Activity: Inactive (12/17/2024)    Exercise Vital Sign     Days of Exercise per Week: 0 days     Minutes of Exercise per Session: 0 min   Stress: No Stress Concern Present (4/23/2025)    [employer] - Occupational Stress Questionnaire     Feeling of Stress : Not at all   Housing Stability: Low Risk  (4/23/2025)    Housing Stability Vital Sign     Unable to Pay for Housing in the Last Year: No     Homeless in the Last Year: No

## 2025-05-14 NOTE — PROGRESS NOTES
[facility] unable to accept pt with [facility]. Referral for SNF placement submitted to [place] via [facility].

## 2025-05-15 LAB
ANION GAP SERPL CALC-SCNC: 10 MEQ/L
ANION GAP SERPL CALC-SCNC: 11 MEQ/L
BASOPHILS # BLD AUTO: 0.01 X10(3)/MCL
BASOPHILS NFR BLD AUTO: 0.1 %
BUN SERPL-MCNC: 47.2 MG/DL (ref 8.4–25.7)
BUN SERPL-MCNC: 47.7 MG/DL (ref 8.4–25.7)
BUN SERPL-MCNC: 48.7 MG/DL (ref 8.4–25.7)
BUN SERPL-MCNC: 49.7 MG/DL (ref 8.4–25.7)
BUN SERPL-MCNC: 52.8 MG/DL (ref 8.4–25.7)
BUN SERPL-MCNC: 55.5 MG/DL (ref 8.4–25.7)
CALCIUM SERPL-MCNC: 9.2 MG/DL (ref 8.8–10)
CALCIUM SERPL-MCNC: 9.3 MG/DL (ref 8.8–10)
CALCIUM SERPL-MCNC: 9.4 MG/DL (ref 8.8–10)
CALCIUM SERPL-MCNC: 9.6 MG/DL (ref 8.8–10)
CHLORIDE SERPL-SCNC: 98 MMOL/L (ref 98–107)
CHLORIDE SERPL-SCNC: 99 MMOL/L (ref 98–107)
CHLORIDE SERPL-SCNC: 99 MMOL/L (ref 98–107)
CO2 SERPL-SCNC: 22 MMOL/L (ref 23–31)
CO2 SERPL-SCNC: 23 MMOL/L (ref 23–31)
CO2 SERPL-SCNC: 24 MMOL/L (ref 23–31)
CREAT SERPL-MCNC: 1.05 MG/DL (ref 0.72–1.25)
CREAT SERPL-MCNC: 1.14 MG/DL (ref 0.72–1.25)
CREAT SERPL-MCNC: 1.18 MG/DL (ref 0.72–1.25)
CREAT SERPL-MCNC: 1.19 MG/DL (ref 0.72–1.25)
CREAT SERPL-MCNC: 1.21 MG/DL (ref 0.72–1.25)
CREAT SERPL-MCNC: 1.29 MG/DL (ref 0.72–1.25)
CREAT/UREA NIT SERPL: 39
CREAT/UREA NIT SERPL: 42
CREAT/UREA NIT SERPL: 43
CREAT/UREA NIT SERPL: 43
CREAT/UREA NIT SERPL: 44
CREAT/UREA NIT SERPL: 45
EOSINOPHIL # BLD AUTO: 0 X10(3)/MCL (ref 0–0.9)
EOSINOPHIL NFR BLD AUTO: 0 %
ERYTHROCYTE [DISTWIDTH] IN BLOOD BY AUTOMATED COUNT: 26.4 % (ref 11.5–17)
GFR SERPLBLD CREATININE-BSD FMLA CKD-EPI: >60 ML/MIN/1.73/M2
GLUCOSE SERPL-MCNC: 127 MG/DL (ref 82–115)
GLUCOSE SERPL-MCNC: 148 MG/DL (ref 82–115)
GLUCOSE SERPL-MCNC: 151 MG/DL (ref 82–115)
GLUCOSE SERPL-MCNC: 151 MG/DL (ref 82–115)
GLUCOSE SERPL-MCNC: 173 MG/DL (ref 82–115)
GLUCOSE SERPL-MCNC: 189 MG/DL (ref 82–115)
HCT VFR BLD AUTO: 27.7 % (ref 42–52)
HGB BLD-MCNC: 8.8 G/DL (ref 14–18)
IMM GRANULOCYTES # BLD AUTO: 0.14 X10(3)/MCL (ref 0–0.04)
IMM GRANULOCYTES NFR BLD AUTO: 0.9 %
LYMPHOCYTES # BLD AUTO: 0.51 X10(3)/MCL (ref 0.6–4.6)
LYMPHOCYTES NFR BLD AUTO: 3.5 %
MAGNESIUM SERPL-MCNC: 1.7 MG/DL (ref 1.6–2.6)
MAGNESIUM SERPL-MCNC: 1.8 MG/DL (ref 1.6–2.6)
MAGNESIUM SERPL-MCNC: 1.8 MG/DL (ref 1.6–2.6)
MAGNESIUM SERPL-MCNC: 2.2 MG/DL (ref 1.6–2.6)
MAGNESIUM SERPL-MCNC: 2.3 MG/DL (ref 1.6–2.6)
MAGNESIUM SERPL-MCNC: 2.3 MG/DL (ref 1.6–2.6)
MCH RBC QN AUTO: 24.6 PG (ref 27–31)
MCHC RBC AUTO-ENTMCNC: 31.8 G/DL (ref 33–36)
MCV RBC AUTO: 77.4 FL (ref 80–94)
MONOCYTES # BLD AUTO: 0.86 X10(3)/MCL (ref 0.1–1.3)
MONOCYTES NFR BLD AUTO: 5.8 %
NEUTROPHILS # BLD AUTO: 13.22 X10(3)/MCL (ref 2.1–9.2)
NEUTROPHILS NFR BLD AUTO: 89.7 %
NRBC BLD AUTO-RTO: 0 %
PHOSPHATE SERPL-MCNC: 3.6 MG/DL (ref 2.3–4.7)
PHOSPHATE SERPL-MCNC: 3.6 MG/DL (ref 2.3–4.7)
PHOSPHATE SERPL-MCNC: 3.7 MG/DL (ref 2.3–4.7)
PHOSPHATE SERPL-MCNC: 3.8 MG/DL (ref 2.3–4.7)
PHOSPHATE SERPL-MCNC: 3.9 MG/DL (ref 2.3–4.7)
PHOSPHATE SERPL-MCNC: 3.9 MG/DL (ref 2.3–4.7)
PLATELET # BLD AUTO: 192 X10(3)/MCL (ref 130–400)
PLATELETS.RETICULATED NFR BLD AUTO: 4.2 % (ref 0.9–11.2)
PMV BLD AUTO: 11 FL (ref 7.4–10.4)
POCT GLUCOSE: 154 MG/DL (ref 70–110)
POCT GLUCOSE: 288 MG/DL (ref 70–110)
POCT GLUCOSE: 304 MG/DL (ref 70–110)
POTASSIUM SERPL-SCNC: 3.5 MMOL/L (ref 3.5–5.1)
POTASSIUM SERPL-SCNC: 3.5 MMOL/L (ref 3.5–5.1)
POTASSIUM SERPL-SCNC: 3.6 MMOL/L (ref 3.5–5.1)
POTASSIUM SERPL-SCNC: 3.7 MMOL/L (ref 3.5–5.1)
POTASSIUM SERPL-SCNC: 3.8 MMOL/L (ref 3.5–5.1)
POTASSIUM SERPL-SCNC: 3.8 MMOL/L (ref 3.5–5.1)
RBC # BLD AUTO: 3.58 X10(6)/MCL (ref 4.7–6.1)
SODIUM SERPL-SCNC: 131 MMOL/L (ref 136–145)
SODIUM SERPL-SCNC: 132 MMOL/L (ref 136–145)
SODIUM SERPL-SCNC: 132 MMOL/L (ref 136–145)
SODIUM SERPL-SCNC: 133 MMOL/L (ref 136–145)
WBC # BLD AUTO: 14.74 X10(3)/MCL (ref 4.5–11.5)

## 2025-05-15 PROCEDURE — 80048 BASIC METABOLIC PNL TOTAL CA: CPT

## 2025-05-15 PROCEDURE — 63600175 PHARM REV CODE 636 W HCPCS

## 2025-05-15 PROCEDURE — 21400001 HC TELEMETRY ROOM

## 2025-05-15 PROCEDURE — 36415 COLL VENOUS BLD VENIPUNCTURE: CPT

## 2025-05-15 PROCEDURE — 27000207 HC ISOLATION

## 2025-05-15 PROCEDURE — 94640 AIRWAY INHALATION TREATMENT: CPT

## 2025-05-15 PROCEDURE — 83735 ASSAY OF MAGNESIUM: CPT

## 2025-05-15 PROCEDURE — 25000003 PHARM REV CODE 250

## 2025-05-15 PROCEDURE — 99232 SBSQ HOSP IP/OBS MODERATE 35: CPT | Mod: GC,,, | Performed by: INTERNAL MEDICINE

## 2025-05-15 PROCEDURE — 84100 ASSAY OF PHOSPHORUS: CPT

## 2025-05-15 PROCEDURE — 85025 COMPLETE CBC W/AUTO DIFF WBC: CPT

## 2025-05-15 PROCEDURE — 97530 THERAPEUTIC ACTIVITIES: CPT

## 2025-05-15 PROCEDURE — 94761 N-INVAS EAR/PLS OXIMETRY MLT: CPT

## 2025-05-15 PROCEDURE — 25000242 PHARM REV CODE 250 ALT 637 W/ HCPCS

## 2025-05-15 RX ORDER — MAGNESIUM SULFATE HEPTAHYDRATE 40 MG/ML
2 INJECTION, SOLUTION INTRAVENOUS ONCE
Status: COMPLETED | OUTPATIENT
Start: 2025-05-15 | End: 2025-05-15

## 2025-05-15 RX ORDER — BUMETANIDE 1 MG/1
1 TABLET ORAL DAILY
Status: COMPLETED | OUTPATIENT
Start: 2025-05-15 | End: 2025-05-15

## 2025-05-15 RX ADMIN — FOLIC ACID 1 MG: 1 TABLET ORAL at 09:05

## 2025-05-15 RX ADMIN — LEVALBUTEROL HYDROCHLORIDE 1.25 MG: 1.25 SOLUTION RESPIRATORY (INHALATION) at 06:05

## 2025-05-15 RX ADMIN — INSULIN ASPART 1 UNITS: 100 INJECTION, SOLUTION INTRAVENOUS; SUBCUTANEOUS at 08:05

## 2025-05-15 RX ADMIN — BUMETANIDE 1 MG: 1 TABLET ORAL at 12:05

## 2025-05-15 RX ADMIN — Medication 1000 UNITS: at 09:05

## 2025-05-15 RX ADMIN — POTASSIUM BICARBONATE 25 MEQ: 977.5 TABLET, EFFERVESCENT ORAL at 09:05

## 2025-05-15 RX ADMIN — QUETIAPINE FUMARATE 100 MG: 100 TABLET ORAL at 09:05

## 2025-05-15 RX ADMIN — PANTOPRAZOLE SODIUM 40 MG: 40 TABLET, DELAYED RELEASE ORAL at 09:05

## 2025-05-15 RX ADMIN — HYDROCORTISONE SODIUM SUCCINATE 50 MG: 100 INJECTION, POWDER, FOR SOLUTION INTRAMUSCULAR; INTRAVENOUS at 09:05

## 2025-05-15 RX ADMIN — MAGNESIUM SULFATE HEPTAHYDRATE 2 G: 40 INJECTION, SOLUTION INTRAVENOUS at 09:05

## 2025-05-15 RX ADMIN — IPRATROPIUM BROMIDE 0.5 MG: 0.5 SOLUTION RESPIRATORY (INHALATION) at 06:05

## 2025-05-15 RX ADMIN — ATORVASTATIN CALCIUM 80 MG: 40 TABLET, FILM COATED ORAL at 09:05

## 2025-05-15 RX ADMIN — IPRATROPIUM BROMIDE 0.5 MG: 0.5 SOLUTION RESPIRATORY (INHALATION) at 07:05

## 2025-05-15 RX ADMIN — RIVAROXABAN 20 MG: 10 TABLET, FILM COATED ORAL at 05:05

## 2025-05-15 RX ADMIN — HYDROCORTISONE SODIUM SUCCINATE 50 MG: 100 INJECTION, POWDER, FOR SOLUTION INTRAMUSCULAR; INTRAVENOUS at 05:05

## 2025-05-15 RX ADMIN — HYDROCORTISONE SODIUM SUCCINATE 50 MG: 100 INJECTION, POWDER, FOR SOLUTION INTRAMUSCULAR; INTRAVENOUS at 02:05

## 2025-05-15 RX ADMIN — LEVALBUTEROL HYDROCHLORIDE 1.25 MG: 1.25 SOLUTION RESPIRATORY (INHALATION) at 07:05

## 2025-05-15 RX ADMIN — CLOPIDOGREL BISULFATE 75 MG: 75 TABLET, FILM COATED ORAL at 09:05

## 2025-05-15 NOTE — PT/OT/SLP PROGRESS
Occupational Therapy   Treatment    Name: Ran Reyes Jr.  MRN: [id number]  Admitting Diagnosis:       1. ELIZABETH (acute kidney injury)    2. Weakness    3. Dyspnea    4. Screening due    5. HFrEF (heart failure with reduced ejection fraction)    6. Acidemia    7. Microcytic anemia    8. Tobacco dependency    9. Acute on chronic combined systolic and diastolic congestive heart failure    10. Bradycardia    11. QT prolongation    12. Heart failure with reduced ejection fraction    13. Heart failure    14. At risk for long QT syndrome    15. Chest pain    16. Acute combined systolic and diastolic congestive heart failure    17. Nonrheumatic mitral valve regurgitation    18. Syncope    Problem List[1]   Recommendations:     Discharge Recommendations: Moderate Intensity Therapy  Discharge Equipment Recommendations:  bath bench  Barriers to discharge:  Decreased caregiver support, Other (Comment) (medical dx and PMHx)    Assessment:     Ran Reyes Jr. is a 69 y.o. male with a medical diagnosis of .see above.  He presents with willingness to get OOB and participate this am. No c/o of dizziness and or light headedness upon sit to stand this am and MAP 73 start of session and 63 end of session. Performance deficits affecting function are weakness, impaired endurance, impaired self care skills, impaired functional mobility, gait instability, impaired balance, decreased safety awareness, pain, impaired cardiopulmonary response to activity.     Rehab Prognosis:  Fair; patient would benefit from acute skilled OT services to address these deficits and reach maximum level of function.       Plan:     Patient to be seen 3 x/week to address the above listed problems via self-care/home management, therapeutic activities, therapeutic exercises  Plan of Care Expires: 04/28/25  Plan of Care Reviewed with: patient    Subjective     Chief Complaint: abdominal pain   Patient/Family Comments/goals: none stated  Pain/Comfort:  Pain  Rating 1: 0/10  Location 1: abdomen  Pain Addressed 1: Nurse notified  Pain Rating Post-Intervention 1: 7/10    Objective:     Communicated with: Nurse Kaur prior to session.  Patient found HOB elevated with blood pressure cuff, peripheral IV, pulse ox (continuous), telemetry, booth catheter , upon OT entry to room.    General Precautions: Standard, contact, fall, seizure    Orthopedic Precautions:N/A  Braces: N/A  Respiratory Status: Room air    VITALS  Preactivity   Post activity  BP 84/64  76/60  MAP 73  63 (goal per nurse > 60)    NO c/o dizziness and or light headedness upon standing      Occupational Performance:     Bed Mobility:    Patient completed Supine to Sit with stand by assistance     Functional Mobility/Transfers:  Patient completed Sit <> Stand Transfer with stand by assistance  with  rolling walker   Patient completed Bed <> Chair Transfer using Step Transfer technique with stand by assistance with rolling walker  Functional Mobility: Pt ambulated  6 feet bed>sink and 5 feet sink to chair with RW and SBA     Activities of Daily Living:  Grooming: stand by assistance standing balance at sink washing hands and face       Treatment & Education:  Pt stood EOB 3 min with B UE support on RW and SBA upon attempting to take BP however poor retrieval of BP and pt ambulated to sink with RW and SBA and stood 1 min during grooming task and then requested to sit due to c/o abdominal pain and wanting to eat breakfast.     Patient left up in chair with breakfast tray on bedside table and  all lines intact, call button in reach, and nurse  notified    GOALS:   Multidisciplinary Problems       Occupational Therapy Goals          Problem: Occupational Therapy    Goal Priority Disciplines Outcome Interventions   Occupational Therapy Goal     OT, PT/OT Progressing    Description: Pt will ambulate to bathroom with rollator SBA  Pt will complete toilet t/f SBA  Pt will complete toileting tasks SBA  Pt will complete  UE/LE dressing mod I  Pt will increase standing endurance x 5 minutes.                        DME Justifications:  No DME recommended requiring DME justifications    Time Tracking:     OT Date of Treatment: 05/15/25  OT Start Time: 0828  OT Stop Time: 0843  OT Total Time (min): 15 min    Billable Minutes:Therapeutic Activity 15 min     OT/ALLAN: OT          5/15/2025         [1]   Patient Active Problem List  Diagnosis    Primary open angle glaucoma (POAG) of right eye, moderate stage    Combined forms of age-related cataract of left eye    Arteriosclerosis of coronary artery    Chronic atrial fibrillation    Dyslipidemia    Hypertension    Tobacco use    PVD (peripheral vascular disease)    VHD (valvular heart disease)    COVID-19    HFrEF (heart failure with reduced ejection fraction)    Nonrheumatic mitral valve regurgitation    Postoperative eye state    Scrotal hematoma    Chronic obstructive pulmonary disease, unspecified    Lesion of external ear    Low back pain    Other thrombophilia    Secondary hyperaldosteronism    Positive colorectal cancer screening using Cologuard test    Acute heart failure    History of COPD    CHF (congestive heart failure)    Acute cystitis with hematuria    Tobacco dependency    Moderate malnutrition

## 2025-05-15 NOTE — PROGRESS NOTES
As discussed with Nixon/POSUPRIYA, Tiffanie Sweeney, referral for SNF placement submitted to multiple [place] facilities via [facility].

## 2025-05-15 NOTE — PROGRESS NOTES
Pt will be accepted for SNF placement at [facility] if bed available when Level  received.

## 2025-05-15 NOTE — PT/OT/SLP PROGRESS
"Physical Therapy    Missed Treatment Session - patient unwilling to participate note - 05/15/2025    Patient Name:  Ran Reyes Jr.   MRN:  [id number]      Patient not seen at this time secondary to pt. Refused to participate in therapy.  Pt. Reported that he just wants to "relax."  Pt.'s BP was 77/46, however, was not symptomatic.  Nursing notified.    Will follow-up as patient is appropriate/available/agreeable to participate and as therapists' schedule allows.  "

## 2025-05-15 NOTE — PLAN OF CARE
Problem: Skin Injury Risk Increased  Goal: Skin Health and Integrity  Outcome: Ongoing     Problem: Adult Inpatient Plan of Care  Goal: Plan of Care Review  Outcome: Ongoing  Goal: Patient-Specific Goal (Individualized)  Outcome: Ongoing  Goal: Absence of Hospital-Acquired Illness or Injury  Outcome: Ongoing  Goal: Optimal Comfort and Wellbeing  Outcome: Ongoing  Goal: Readiness for Transition of Care  Outcome: Ongoing     Problem: COPD (Chronic Obstructive Pulmonary Disease)  Goal: Optimal Chronic Illness Coping  Outcome: Ongoing  Goal: Optimal Level of Functional [place]  Outcome: Ongoing  Goal: Absence of Infection Signs and Symptoms  Outcome: Ongoing  Goal: Improved Oral Intake  Outcome: Ongoing     Problem: Heart Failure  Goal: Optimal Coping  Outcome: Ongoing  Goal: Optimal Cardiac Output  Outcome: Ongoing  Goal: Stable Heart Rate and Rhythm  Outcome: Ongoing  Goal: Optimal Functional Ability  Outcome: Ongoing  Goal: Fluid and Electrolyte Balance  Outcome: Ongoing  Goal: Improved Oral Intake  Outcome: Ongoing     Problem: Fall Injury Risk  Goal: Absence of Fall and Fall-Related Injury  Outcome: Ongoing     Problem: Wound  Goal: Optimal Coping  Outcome: Ongoing  Goal: Optimal Functional Ability  Outcome: Ongoing  Goal: Absence of Infection Signs and Symptoms  Outcome: Ongoing  Goal: Improved Oral Intake  Outcome: Ongoing  Goal: Optimal Pain Control and Function  Outcome: Ongoing  Goal: Skin Health and Integrity  Outcome: Ongoing  Goal: Optimal Wound Healing  Outcome: Ongoing     Problem: Acute Kidney Injury/Impairment  Goal: Fluid and Electrolyte Balance  Outcome: Ongoing  Goal: Improved Oral Intake  Outcome: Ongoing  Goal: Effective Renal Function  Outcome: Ongoing     Problem: Dysrhythmia  Goal: Normalized Cardiac Rhythm  Outcome: Ongoing     Problem: Infection  Goal: Absence of Infection Signs and Symptoms  Outcome: Ongoing

## 2025-05-15 NOTE — PROGRESS NOTES
Inpatient Nutrition Assessment    Admit Date: 4/21/2025   Total duration of encounter: 24 days   Patient Age: 69 y.o.    Nutrition Recommendation/Prescription     Continue heart healthy/ 1.5 L fluid restriction; encouraged oral intake   continue boost plus -1 carton daily; Boost Plus (provides 360 kcal, 14 g protein per serving) . Pt request extra gravy/sauce; food too dry; will accommodate   Electrolyte replacement as needed   MVI/fe  Biweekly wt  Pt education on diet complete  Will monitor nutrition status     Communication of Recommendations: reviewed with nurse and reviewed with patient    Nutrition Assessment     Malnutrition Assessment/Nutrition-Focused Physical Exam    Malnutrition Context: chronic illness (05/13/25 1312)  Malnutrition Level: moderate (05/13/25 1312)  Energy Intake (Malnutrition): less than 75% for greater than or equal to 1 month (05/13/25 1312)  Weight Loss (Malnutrition): greater than 5% in 1 month (05/13/25 1312)  Subcutaneous Fat (Malnutrition): mild depletion (05/13/25 1312)  Orbital Region (Subcutaneous Fat Loss): mild depletion  Upper Arm Region (Subcutaneous Fat Loss): mild depletion     Muscle Mass (Malnutrition): mild depletion (05/13/25 1312)     Clavicle Bone Region (Muscle Loss): mild depletion         Fluid Accumulation (Malnutrition): mild (05/13/25 1312)     Hand  Strength, Right (Malnutrition): Unable to assess (05/13/25 1312)  A minimum of two characteristics is recommended for diagnosis of either severe or non-severe malnutrition.    Chart Review    Reason Seen: continuous nutrition monitoring and follow-up    Malnutrition Screening Tool Results   Have you recently lost weight without trying?: No  Have you been eating poorly because of a decreased appetite?: No   MST Score: 0   Diagnosis:  Afib, CAD, heart failure, pulmonary HTN, volume overload, hyponatremia, ELIZABETH, anion gap acidemia, abnormal UA, PVD, anemia, COPD, glaucoma ; cardiogenic shock , sepsis    Relevant  Medical History: tobacco use, COPD, HTN, HLD, Afib, heart failure, PVD, fourniers gangrene    Scheduled Medications:  atorvastatin, 80 mg, Daily  clopidogreL, 75 mg, Daily  ferrous sulfate, 1 tablet, Q48H  folic acid, 1 mg, Daily  hydrocortisone sodium succinate, 50 mg, Q8H  ipratropium, 0.5 mg, Q12H  levalbuterol, 1.25 mg, Q12H  miconazole NITRATE 2 %, , BID  pantoprazole, 40 mg, Daily  QUEtiapine, 100 mg, QHS  rivaroxaban, 20 mg, Daily  vitamin D, 1,000 Units, Daily    Continuous Infusions:  0.9% NaCl, Last Rate: Stopped (05/04/25 0591)    PRN Medications:  acetaminophen, 650 mg, Q4H PRN  aluminum-magnesium hydroxide, 30 mL, BID PRN  dextrose 50%, 12.5 g, PRN  dextrose 50%, 25 g, PRN  diphenhydrAMINE, 25 mg, Q6H PRN  glucagon (human recombinant), 1 mg, PRN  glucose, 16 g, PRN  glucose, 24 g, PRN  guaiFENesin 100 mg/5 ml, 200 mg, Q4H PRN  hydrALAZINE, 10 mg, Q4H PRN  hydrocortisone, , TID PRN  insulin aspart U-100, 0-5 Units, QID (AC + HS) PRN  lorazepam, 1 mg, Q6H PRN  melatonin, 6 mg, Nightly PRN  naloxone, 0.02 mg, PRN  nicotine, 1 patch, Daily PRN  polyethylene glycol, 17 g, Daily PRN  prochlorperazine, 5 mg, Q6H PRN  senna-docusate, 1 tablet, BID PRN  simethicone, 1 tablet, TID PRN  sodium chloride 0.9%, 10 mL, PRN  traZODone, 25 mg, Nightly PRN    Calorie Containing IV Medications: no significant kcals from medications at this time    Recent Labs   Lab 05/09/25  0407 05/09/25  0821 05/10/25  0346 05/10/25  0805 05/11/25  0409 05/11/25  0814 05/12/25  0356 05/12/25  0828 05/13/25  0253 05/13/25  1234 05/14/25  0329 05/14/25  0929 05/14/25  1139 05/14/25  1552 05/14/25 1955 05/15/25  0010 05/15/25  0323 05/15/25  0902   *   < > 129*   < > 132*   < > 134*   < > 133*   < > 133* 137 134* 134* 134* 132* 131* 132*   K 3.5   < > 2.6*   < > 3.1*   < > 3.5   < > 4.3   < > 3.3* 3.1* 3.3* 3.5 3.6 3.7 3.5 3.5   CALCIUM 9.4   < > 9.8   < > 10.3*   < > 10.3*   < > 9.5   < > 9.2 9.0 9.1 9.4 9.4 9.2 9.3 9.4   PHOS 3.1    < > 3.0   < > 3.3   < > 3.8   < > 3.3  3.3   < > 3.6 3.5 3.7 3.8 3.8 3.9 3.9 3.8   MG 2.10   < > 2.20   < > 2.10   < > 2.00   < > 1.80  1.90   < > 1.80 1.70 1.70 1.80 1.70 1.70 1.80 1.80   CL 79*   < > 82*   < > 88*   < > 93*   < > 96*   < > 99 99 99 98 96* 98 98 98   CO2 35*   < > 33*   < > 30   < > 28   < > 25   < > 24 25 23 26 24 24 22* 23   BUN 61.5*   < > 55.5*   < > 52.0*   < > 49.4*   < > 42.3*   < > 42.8* 40.0* 42.7* 49.2* 52.2* 55.5* 52.8* 48.7*   CREATININE 1.56*   < > 1.27*   < > 1.15   < > 1.09   < > 1.06   < > 0.97 0.92 1.11 1.24 1.38* 1.29* 1.19 1.14   EGFRNORACEVR 48   < > >60   < > >60   < > >60   < > >60   < > >60 >60 >60 >60 56 >60 >60 >60   BILITOT  --   --   --   --   --   --   --   --  4.6*  --   --   --   --   --   --   --   --   --    ALKPHOS  --   --   --   --   --   --   --   --  246*  --   --   --   --   --   --   --   --   --    ALT  --   --   --   --   --   --   --   --  48  --   --   --   --   --   --   --   --   --    AST  --   --   --   --   --   --   --   --  49*  --   --   --   --   --   --   --   --   --    ALBUMIN  --   --   --   --   --   --   --   --  3.1*  --   --   --   --   --   --   --   --   --    WBC 9.42  --  9.18  --  9.30  --  9.65  --  8.38  --  11.76*  --   --   --   --   --  14.74*  --    HGB 7.7*  --  7.5*  --  7.9*  --  8.0*  --  7.8*  --  8.3*  --   --   --   --   --  8.8*  --    HCT 23.0*  --  22.6*  --  24.3*  --  24.7*  --  24.6*  --  25.3*  --   --   --   --   --  27.7*  --     < > = values in this interval not displayed.     Nutrition Orders:  Diet Heart Healthy Fluid - 1500mL; Standard Tray  Dietary nutrition supplements Daily; Boost Plus Nutritional Drink - Rich Chocolate    Appetite/Oral Intake: fair/50-75% of meals  Factors Affecting Nutritional Intake: decreased appetite and shortness of breath  Social Needs Impacting Access to Food: none identified  Food/Sikh/Cultural Preferences: none reported  Food Allergies: none reported  Last Bowel Movement:  05/13/25  Wound(s):  none    Comments  (5/15) Pt po intake continues to fluctuate --depending on meal served; pt not liking food; pt did eat well for breakfast; he likes more of breakfast items being served; encouraged pt to select lunch/dinner items with nutrition  during rounding and drink ONS. Noted Gluc (H)--monitor. Case management working on possible SNF placement.     (5/13) Pt ate well for breakfast today; 100% of meal; overall po intake --fair--up/down; food too dry; will allow extra gravy/sauce to moisten food; noted wt decreased; partly fluid /partly wt loss--9% decrease from UBW. Pt is drinking ONS. LFTs remain elevated. Case management --working on possible SNF placement.    (5/9) Pt laying in bed; reported appetite --fluctuating --up/down; ate part of breakfast this am; drinking boost supplement. Bed wt decreased 88.6kg--on diuretic; wt has been fluctuating. Encouraged oral intake/supplement use. Labs acknowledged: Gluc (H)--monitor; Alk Phos /Tbili remain elevated.     (5/5) Pt sitting in chair; reported appetite up/down; not liking food; encouraged pt to select food options with nutrition ; pt eating approx 50% meals but does drink ONS. Wt stable 91.5kg (5-3). Labs acknowledged: Gluc (H)--no hx DM; LFTs remain elevated. H/H (L)--on Fe supplement. + LE edema; on diuretic. Current diet/ONS appropriate.     (5/1) Pt reported he ate well for breakfast; depends what is on menu; appetite fair/good; drinking ONS; wt fluctuates with fluid; noted low K--on electrolyte repletion. Tbili remains elevated. Current diet tx appropriate.    (4/29) Pt sitting in chair during rounds ; eating peanut butter cookies; reported not liking food; wants gravy on meat; will honor request; ate eggs and sausage for breakfast; fair po intake; pt is drinking ONS. No new wt--in chair/unable to weigh. H/h(L)--consider fe : Tbili remains elevated. Pt remains on levophed.     (4/25) Pt tolerating oral diet; po intake  remains fair; encouraged ONS; noted low K/Mg--suggest electrolyte repletion. Pt remains on levophed/pressor support. Tbili--elevated. Pt wt 196#; diuresis.     () Pt reported fair po intake; eating 50-75% meals; + LE edema; mild fat/muscle wasting; wt fluctuates with fluid; on diuretic; UBW between 205-225#. Pt willing to drink ONS; will order once daily --need to monitor fluid volume. Pt education complete on diet tx.     Anthropometrics    Height: 6' (182.9 cm), Height Method: Stated  Last Weight: 85 kg (187 lb 6.3 oz) (25 06), Weight Method: Bed Scale  BMI (Calculated): 25.4  BMI Classification: overweight (BMI 25-29.9)        Ideal Body Weight (IBW), Male: 178 lb     % Ideal Body Weight, Male (lb): 109.37 %                 Usual Body Weight (UBW), k kg (wt fluctuates 205-225#; fluid)  % Usual Body Weight: 100     Usual Weight Provided By: patient and EMR weight history    Wt Readings from Last 5 Encounters:   25 85 kg (187 lb 6.3 oz)   25 102.1 kg (225 lb)   25 101.2 kg (223 lb)   25 102.5 kg (225 lb 14.4 oz)   02/15/25 103.6 kg (228 lb 6.3 oz)     Weight Change(s) Since Admission: -225#  Wt Readings from Last 1 Encounters:   25 0600 85 kg (187 lb 6.3 oz)   05/10/25 0523 89.1 kg (196 lb 6.9 oz)   25 06 88.6 kg (195 lb 5.2 oz)   25 1928 93.7 kg (206 lb 9.1 oz)   25 06 91.5 kg (201 lb 12.8 oz)   25 0700 90.3 kg (199 lb 1.2 oz)   25 0600 90.3 kg (199 lb 1.2 oz)   25 1030 88.3 kg (194 lb 10.7 oz)   25 0547 88.3 kg (194 lb 10.7 oz)   25 0600 89.1 kg (196 lb 6.9 oz)   25 0555 89.1 kg (196 lb 6.9 oz)   25 0502 87.8 kg (193 lb 9 oz)   25 0610 92.8 kg (204 lb 9.6 oz)   25 1709 96.6 kg (213 lb)   25 1137 97.5 kg (215 lb)   Admit Weight: 97.5 kg (215 lb) (25 1137), Weight Method: Stated    Estimated Needs    Weight Used For Calorie Calculations: 85 kg (187 lb 6.3 oz)  Energy Calorie  Requirements (kcal): 2295 kcal/d; 27 kcal/kg  Energy Need Method: Kcal/kg  Weight Used For Protein Calculations: 85 kg (187 lb 6.3 oz)  Protein Requirements: 102 gm prtotein/d; 1.2 gm/kg  Fluid Requirements (mL): 2125 ml/d; 25 ml/kg /CHF        Enteral Nutrition     Patient not receiving enteral nutrition at this time.    Parenteral Nutrition     Patient not receiving parenteral nutrition support at this time.    Evaluation of Received Nutrient Intake    Calories: not meeting estimated needs; (5/13) po intake fair   Protein: not meeting estimated needs    Patient Education     Education Provided: heart healthy diet  Teaching Method: explanation and printed materials  Comprehension: verbalizes understanding  Barriers to Learning: none evident  Expected Compliance: fair  Comments: All questions were answered and dietitian's contact information was provided.     Nutrition Diagnosis     PES: Inadequate oral intake related to chronic illness as evidenced by eating 75% or less meals . (active)     PES: Moderate chronic disease or condition related malnutrition Related to chronic illness  As Evidenced by:  - energy intake: < 75% for 3 weeks (meets criteria for < 75% for > 7 days (moderate - acute)) - muscle mass depletion: 2 areas of mild muscle loss (Trapezius, Clavicle) - loss of subcutaneous fat: 3 areas of mild fat loss (Buccal, Triceps Skinfold, Infraorbital) - fluid accumulation: 1 area of mild fluid accumulation (Lower extremities edema) active    Nutrition Interventions     Intervention(s): modified composition of meals/snacks, multivitamin/mineral supplement therapy, purpose of nutrition education, and collaboration with other providers  Intervention(s): Oral diet/nutrient modifications;Nutrition education;Care coordination or referral;Oral nutritional supplement    Goal: Meet greater than 80% of nutritional needs by follow-up. (goal progressing)  Goal: Maintain weight throughout hospitalization. (goal  progressing)    Nutrition Goals & Monitoring     Dietitian will monitor: food and beverage intake, weight, and food/nutrition knowledge skill  Discharge planning: continue heart healthy diet with boost plus  oral supplements  Nutrition Risk/Follow-Up: patient at increased nutrition risk; dietitian will follow-up twice weekly   Please consult if re-assessment needed sooner.

## 2025-05-15 NOTE — PROGRESS NOTES
[facility] Surg Telemetry  Pulmonary Critical Care Note    Patient Name: Ran Reyes Jr.  MRN: [id number]  Admission Date: 4/21/2025  Hospital Length of Stay: 23 days  Code Status: Full Code  Attending Provider: Osvaldo Lynne MD  Primary Care Provider: Abiodun Recinos DO     Subjective:     HPI:   Ran Reyes Jr. is a 69 y.o. 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 Patient reports he can only ambulate about 20-30 feet before having to stop due to claudication pain which is chronic and unchanged compared to baseline. Since running out of his diuretic medications approximately 5 days ago he has noted progressively worsening lower extremity swelling causing leg heaviness and weakness exacerbating difficulty ambulating.  He also notes acute on chronic cough productive of a brownish sputum without hemoptysis.  Denies fever, chills, sweats.  Denies chest pain.  Endorses shortness of breath at rest and with ambulation but unchanged compared to baseline.  Denies abdominal pain, nausea, vomiting, constipation, diarrhea.  Denies increased or decreased urinary frequency, dysuria, or hematuria.  Sleeps with 2 pillows at night due to baseline orthopnea. Has not had to increase number of pillows or change sleeping habits. Wears 2L NC home oxygen at baseline for hx of COPD. Denies having increased oxygen requirements prior to ED presentation.     ED course:  Vital signs stable.  Oxygenation  stable on 2 L nasal cannula.  CBC shows microcytic anemic (HGB 9.0; MCV 77).  CMP shows hyponatremia (Na 128), acidemia (CO2 14), elevated renal indices (BUN 25; creatinine 1.93), elevated alkaline phosphatase (). Troponin WNL.  BNP 26 39.5.  UDS negative.  EKG showed atrial fibrillation with PVCs.  Chest x-ray difficult to interpret due to overlying medical devices however appears to show cardiomegaly with bilateral pulmonary vascular congestion with question of bilateral pleural effusions. He was admitted for acute CHF exacerbation and cardiorenal syndrome.      Hospital Course/Significant events:  4/23/25: Admit to ICU, started on pressors and inotropes for cardiogenic / septic shock   5/2/25: Downgraded to    5/3/25: Re-upgraded to ICU 2/2 worsening lactate and agonal breathing  5/13/25: Weaned off dobutamine    24 Hour Interval History:  NAEON. Patient denies any acute complaints today. Vitals show patient BP at goal relative to patient EF. Remaining vitals stable. 1925 cc UOP. He remains off dobutamine gtt at this time. CBC showed mild leukocytosis and stable microcytic anemia.  CMP showed mild hyponatremia as well as potassium and magnesium below goal for CHF.     Past Medical History:   Diagnosis Date    A-fib     Anticoagulant long-term use     Anxiety     Aortic aneurysm     Cataract     CHF (congestive heart failure)     Chronic atrial fibrillation     COPD (chronic obstructive pulmonary disease)     Coronary artery disease     Depression     HLD (hyperlipidemia)     Hypertension     Mitral regurgitation     PAD (peripheral artery disease)     Primary open angle glaucoma (POAG)        Past Surgical History:   Procedure Laterality Date    ANGIOGRAM, CORONARY, WITH LEFT HEART CATHETERIZATION N/A 03/26/2024    Procedure: Angiogram, Coronary, with Left Heart Cath;  Surgeon: Adriano Montiel MD;  Location: [facility] CATH LAB;  Service: Cardiology;  Laterality: N/A;    ATHERECTOMY OF PERIPHERAL VESSEL Left  09/12/2022    LEFT SFA ATHERECTOMY, BALLOON ANGIOPLASTY    CATARACT EXTRACTION W/  INTRAOCULAR LENS IMPLANT Left 03/09/2023    Procedure: EXTRACTION, CATARACT, WITH IOL INSERTION;  Surgeon: Georgi Borja MD;  Location: [facility];  Service: Ophthalmology;  Laterality: Left;  19.5  mac    EGD, WITH CLOSED BIOPSY  03/22/2024    Procedure: EGD, WITH CLOSED BIOPSY;  Surgeon: Ronald Calderon MD;  Location: [facility] ENDOSCOPY;  Service: Gastroenterology;;    ESOPHAGOGASTRODUODENOSCOPY N/A 03/22/2024    Procedure: EGD;  Surgeon: Ronald Calderon MD;  Location: [facility] ENDOSCOPY;  Service: Gastroenterology;  Laterality: N/A;    Heart Stent N/A     > 10yrs. AGO    INCISION AND DRAINAGE N/A 03/18/2024    Procedure: Incision and Drainage;  Surgeon: Fahad Rivas MD;  Location: [facility];  Service: Urology;  Laterality: N/A;  I&D SCROTAL ABSCESS    INSERTION OF STENT INTO PERIPHERAL VESSEL Right 10/17/2022    RIGHT SFA ATHERECTOMY, BALLOON ANGIOPLASTY, STENT; RIGHT ANTERIOR TIBIAL ARTERY ATHERECTOMY, BALLOON ANGIOPLASTY    ORCHIECTOMY Left 03/18/2024    Procedure: ORCHIECTOMY;  Surgeon: Fahad Rivas MD;  Location: [facility];  Service: Urology;  Laterality: Left;       Social History[1]    Current Outpatient Medications   Medication Instructions    acetaminophen 650 mg, 2 times daily PRN    ALBUTEROL INHL 2 puffs, Every 6 hours PRN    albuterol-ipratropium (DUO-NEB) 2.5 mg-0.5 mg/3 mL nebulizer solution 3 mLs, Nebulization, Every 6 hours PRN, Rescue    aspirin (ECOTRIN) 81 MG EC tablet 1 tablet, Daily    atorvastatin (LIPITOR) 80 mg, Oral, Daily    BREZTRI AEROSPHERE 160-9-4.8 mcg/actuation HFAA 2 puffs, 2 times daily    cetirizine (ZYRTEC) 10 mg, Oral, Daily    clopidogreL (PLAVIX) 75 mg, Oral, Daily    folic acid (FOLVITE) 1 mg, Oral, Daily    furosemide (LASIX) 40 mg, Oral, 2 times daily    gabapentin (NEURONTIN) 300 mg, Oral, 3 times daily    metoprolol succinate (TOPROL-XL) 12.5 mg, Oral, Daily     nicotine (NICODERM CQ) 14 mg/24 hr 1 patch, Transdermal, Daily PRN    nicotine (NICODERM CQ) 21 mg/24 hr 1 patch, Transdermal, Daily    nitrofurantoin, macrocrystal-monohydrate, (MACROBID) 100 MG capsule 100 mg, Oral, 2 times daily    olopatadine (PATANOL) 0.1 % ophthalmic solution Apply to eye.    pantoprazole (PROTONIX) 40 mg, Oral, 2 times daily    potassium chloride SA (K-DUR,KLOR-CON) 20 MEQ tablet 20 mEq, Oral, 2 times daily    rivaroxaban (XARELTO) 20 mg, Oral, Daily    sacubitriL-valsartan (ENTRESTO) 49-51 mg per tablet 1 tablet, Daily    sertraline (ZOLOFT) 100 mg, Oral, Daily    urea (CARMOL) 40 % Crea Apply topically.    varenicline tartrate (CHANTIX) 0.5 mg    vitamin D (VITAMIN D3) 1,000 Units, Daily     Current Inpatient Medications   atorvastatin  80 mg Oral Daily    clopidogreL  75 mg Oral Daily    ferrous sulfate  1 tablet Oral Q48H    folic acid  1 mg Oral Daily    hydrocortisone sodium succinate  50 mg Intravenous Q8H    ipratropium  0.5 mg Nebulization Q12H    levalbuterol  1.25 mg Nebulization Q12H    magnesium sulfate 2 g IVPB  2 g Intravenous Once    miconazole NITRATE 2 %   Topical (Top) BID    pantoprazole  40 mg Oral Daily    QUEtiapine  100 mg Oral QHS    rivaroxaban  20 mg Oral Daily    vitamin D  1,000 Units Oral Daily     Current Intravenous Infusions   0.9% NaCl   Intravenous Continuous   Stopped at 05/04/25 3869     ROS:   Review of Systems   Constitutional:  Negative for chills, diaphoresis, fever and malaise/fatigue.   HENT:  Negative for sore throat and tinnitus.    Eyes:  Negative for pain, discharge and redness.   Respiratory:  Negative for cough, hemoptysis, sputum production, shortness of breath and wheezing.    Cardiovascular:  Positive for leg swelling. Negative for chest pain, palpitations, orthopnea and claudication.   Gastrointestinal:  Negative for abdominal pain, blood in stool, constipation, diarrhea, melena, nausea and vomiting.   Genitourinary:  Negative for  dysuria, flank pain, frequency, hematuria and urgency.   Musculoskeletal:  Negative for joint pain and myalgias.   Neurological:  Negative for weakness and headaches.           Objective:     Intake/Output Summary (Last 24 hours) at 5/15/2025 1011  Last data filed at 5/15/2025 0533  Gross per 24 hour   Intake 500 ml   Output 925 ml   Net -425 ml     Vital Signs (Most Recent):  Temp: 98.1 °F (36.7 °C) (05/15/25 0900)  Pulse: 89 (05/15/25 0913)  Resp: (!) 22 (05/15/25 0913)  BP: (!) 72/49 (05/15/25 0905)  SpO2: (!) 58 % (05/15/25 0852)  Body mass index is 25.41 kg/m².  Weight: 85 kg (187 lb 6.3 oz) Vital Signs (24h Range):  Temp:  [97.6 °F (36.4 °C)-98.3 °F (36.8 °C)] 98.1 °F (36.7 °C)  Pulse:  [62-99] 89  Resp:  [12-37] 22  SpO2:  [58 %-100 %] 58 %  BP: ()/(44-70) 72/49     Physical Exam:  Physical Exam  Constitutional:       General: He is not in acute distress.     Appearance: Normal appearance. He is not ill-appearing.   HENT:      Head: Normocephalic and atraumatic.   Eyes:      General: No scleral icterus.        Right eye: No discharge.         Left eye: No discharge.      Extraocular Movements: Extraocular movements intact.      Conjunctiva/sclera: Conjunctivae normal.      Pupils: Pupils are equal, round, and reactive to light.   Cardiovascular:      Rate and Rhythm: Normal rate. Rhythm irregular.      Pulses: Normal pulses.      Heart sounds: Murmur heard.      No friction rub. No gallop.   Pulmonary:      Effort: Pulmonary effort is normal. No respiratory distress.      Breath sounds: Normal breath sounds. No stridor. No wheezing, rhonchi or rales.   Abdominal:      General: Abdomen is flat. Bowel sounds are normal. There is no distension.      Palpations: Abdomen is soft.      Tenderness: There is no abdominal tenderness. There is no guarding.   Musculoskeletal:         General: Swelling present. Normal range of motion.      Right lower leg: Edema present.      Left lower leg: Edema present.       Comments: 1+ pitting edema to BLE   Skin:     General: Skin is warm and dry.   Neurological:      General: No focal deficit present.      Mental Status: He is alert and oriented to person, place, and time.           Lines/Drains/Airways       Drain  Duration                  Urethral Catheter 05/05/25 0808 Silicone 16 Fr. 10 days              Peripheral Intravenous Line  Duration                  Peripheral IV - Single Lumen 05/15/25 0505 20 G 2 1/4 in Yes Anterior;Left Forearm <1 day                  Significant Labs:  Lab Results   Component Value Date    WBC 14.74 (H) 05/15/2025    HGB 8.8 (L) 05/15/2025    HCT 27.7 (L) 05/15/2025    MCV 77.4 (L) 05/15/2025     05/15/2025       BMP  Lab Results   Component Value Date     (L) 05/15/2025    K 3.5 05/15/2025    CO2 23 05/15/2025    BUN 48.7 (H) 05/15/2025    CREATININE 1.14 05/15/2025    CALCIUM 9.4 05/15/2025    AGAP 11.0 05/15/2025    EGFRNONAA 88 (L) 12/06/2021     ABG  Recent Labs   Lab 05/08/25  1341   PH 7.610*   PO2 72.0*   PCO2 47.0*   HCO3 47.2*     Mechanical Ventilation Support:  Oxygen Concentration (%): 28 (05/12/25 0044)    Significant Imaging:  I have reviewed the pertinent imaging within the past 24 hours.    Assessment/Plan:     Assessment  Cardiogenic Shock (resolved)  HFmrEF with pulmonary hypertension  TTE showed 22% with PASP 59 mm Hg and grade 3 diastolic dysfunction  NOCAD, NICMO  NSTEMI likely demand  Chronic persistent Afib  Hx of VT arrest (has life vest)  Cardiorenal syndrome (improved)   Adrenal Insufficiency   Low cortisol on ata stim test and normal plast ACTH level  MRI brain to be done inpatient vs outpatient  COPD   Microcytic Anemia   Low TSAT 7%  Prolonged QTC  Groin itch  Anxiety/Insomnia  Hypokalemia     Plan  Downgrade from ICU today  Cardiology following; appreciate assistance  Remains off dobutamine today  Holding entresto 24-26 mg b.i.d. and spironolactone 25 mg q.d. due to BP  Holding diuretics as patient has  adequate urine output without them at this time  SGLT2 contraindicated due to history of Claudine's gangrene  Maintain K>4, Phos > 3 and Mg > 2; repleting electrolytes today  Long term plan is patient will need outpatient referral to advanced heart specialists in [place] for evaluation for LVAD versus heart transplant  Discussed with cardiology and will set MAP > 60 as new MAP goal given patient severely reduced EF unless patient becomes symptomatic from hypotension or develops end organ damage  No indication for levophed at this time but if patient BP is persistently below goal or patient becomes symptomatic then can start levophed for bp support  Continue antiplatelet monotherapy with plavix   Strict I&O and daily weights  Continue IV Hydrocortisone 50 mg q8h due to persistent hypotension previously  LDSSI  Continue Miconazole powder BID   Continue remaining medications:  Levalbuterol and ipratropium q.12 hours, Atorvastatin 80 mg, quetiapine 100 mg q.h.s., q.d., ferrous sulfate 325 mg q.48h, folic acid 1 mg a day    DVT Prophylaxis: Xarelto 20 mg  GI Prophylaxis: PO protonix     32 minutes of critical care was time spent personally by me on the following activities: development of treatment plan with patient or surrogate and bedside caregivers, discussions with consultants, evaluation of patient's response to treatment, examination of patient, ordering and performing treatments and interventions, ordering and review of laboratory studies, ordering and review of radiographic studies, pulse oximetry, re-evaluation of patient's condition.  This critical care time did not overlap with that of any other provider or involve time for any procedures.     Sagar Napoles MD  [facility] Internal Medicine -II  Pulmonary Critical Care Medicine  [facility] Tele[facility]       [1]   Social History  Socioeconomic History    Marital status: Single   Tobacco Use    Smoking status: Every Day     Current  packs/day: 0.50     Average packs/day: 0.8 packs/day for 50.4 years (40.9 ttl pk-yrs)     Types: Cigarettes     Start date: 1975     Passive exposure: Current    Smokeless tobacco: Never    Tobacco comments:     States smoke 2-3 cigarettes per day   Substance and Sexual Activity    Alcohol use: Yes     Alcohol/week: 3.0 standard drinks of alcohol     Types: 3 Cans of beer per week     Comment: socially    Drug use: Not Currently     Frequency: 1.0 times per week     Types: Marijuana     Comment: no use in over 1 year    Sexual activity: Not Currently     Partners: Female     Social Drivers of Health     Financial Resource Strain: Low Risk  (4/23/2025)    Overall Financial Resource Strain (CARDIA)     Difficulty of Paying Living Expenses: Not hard at all   Food Insecurity: No Food Insecurity (4/23/2025)    Hunger Vital Sign     Worried About Running Out of Food in the Last Year: Never true     Ran Out of Food in the Last Year: Never true   Transportation Needs: No Transportation Needs (4/23/2025)    PRAPARE - Transportation     Lack of Transportation (Medical): No     Lack of Transportation (Non-Medical): No   Physical Activity: Inactive (12/17/2024)    Exercise Vital Sign     Days of Exercise per Week: 0 days     Minutes of Exercise per Session: 0 min   Stress: No Stress Concern Present (4/23/2025)    [place] [employer] [place] [employer] - Occupational Stress Questionnaire     Feeling of Stress : Not at all   Housing Stability: Low Risk  (4/23/2025)    Housing Stability Vital Sign     Unable to Pay for Housing in the Last Year: No     Homeless in the Last Year: No

## 2025-05-16 LAB
ANION GAP SERPL CALC-SCNC: 11 MEQ/L
BASOPHILS # BLD AUTO: 0.01 X10(3)/MCL
BASOPHILS NFR BLD AUTO: 0.1 %
BUN SERPL-MCNC: 47.7 MG/DL (ref 8.4–25.7)
CALCIUM SERPL-MCNC: 9 MG/DL (ref 8.8–10)
CHLORIDE SERPL-SCNC: 98 MMOL/L (ref 98–107)
CO2 SERPL-SCNC: 23 MMOL/L (ref 23–31)
CREAT SERPL-MCNC: 1.21 MG/DL (ref 0.72–1.25)
CREAT/UREA NIT SERPL: 39
EOSINOPHIL # BLD AUTO: 0 X10(3)/MCL (ref 0–0.9)
EOSINOPHIL NFR BLD AUTO: 0 %
ERYTHROCYTE [DISTWIDTH] IN BLOOD BY AUTOMATED COUNT: 26.4 % (ref 11.5–17)
GFR SERPLBLD CREATININE-BSD FMLA CKD-EPI: >60 ML/MIN/1.73/M2
GLUCOSE SERPL-MCNC: 119 MG/DL (ref 82–115)
HCT VFR BLD AUTO: 29.5 % (ref 42–52)
HGB BLD-MCNC: 9.3 G/DL (ref 14–18)
IMM GRANULOCYTES # BLD AUTO: 0.11 X10(3)/MCL (ref 0–0.04)
IMM GRANULOCYTES NFR BLD AUTO: 0.7 %
LYMPHOCYTES # BLD AUTO: 0.43 X10(3)/MCL (ref 0.6–4.6)
LYMPHOCYTES NFR BLD AUTO: 2.9 %
MAGNESIUM SERPL-MCNC: 2.1 MG/DL (ref 1.6–2.6)
MCH RBC QN AUTO: 24.6 PG (ref 27–31)
MCHC RBC AUTO-ENTMCNC: 31.5 G/DL (ref 33–36)
MCV RBC AUTO: 78 FL (ref 80–94)
MONOCYTES # BLD AUTO: 1.12 X10(3)/MCL (ref 0.1–1.3)
MONOCYTES NFR BLD AUTO: 7.6 %
NEUTROPHILS # BLD AUTO: 13.16 X10(3)/MCL (ref 2.1–9.2)
NEUTROPHILS NFR BLD AUTO: 88.7 %
NRBC BLD AUTO-RTO: 0 %
PHOSPHATE SERPL-MCNC: 3.6 MG/DL (ref 2.3–4.7)
PLATELET # BLD AUTO: 198 X10(3)/MCL (ref 130–400)
PLATELETS.RETICULATED NFR BLD AUTO: 3.8 % (ref 0.9–11.2)
PMV BLD AUTO: ABNORMAL FL
POCT GLUCOSE: 116 MG/DL (ref 70–110)
POCT GLUCOSE: 127 MG/DL (ref 70–110)
POCT GLUCOSE: 161 MG/DL (ref 70–110)
POCT GLUCOSE: 189 MG/DL (ref 70–110)
POTASSIUM SERPL-SCNC: 3.6 MMOL/L (ref 3.5–5.1)
RBC # BLD AUTO: 3.78 X10(6)/MCL (ref 4.7–6.1)
SODIUM SERPL-SCNC: 132 MMOL/L (ref 136–145)
WBC # BLD AUTO: 14.83 X10(3)/MCL (ref 4.5–11.5)

## 2025-05-16 PROCEDURE — 84100 ASSAY OF PHOSPHORUS: CPT

## 2025-05-16 PROCEDURE — 63600175 PHARM REV CODE 636 W HCPCS

## 2025-05-16 PROCEDURE — 36415 COLL VENOUS BLD VENIPUNCTURE: CPT

## 2025-05-16 PROCEDURE — 25000003 PHARM REV CODE 250

## 2025-05-16 PROCEDURE — 21400001 HC TELEMETRY ROOM

## 2025-05-16 PROCEDURE — 80048 BASIC METABOLIC PNL TOTAL CA: CPT

## 2025-05-16 PROCEDURE — 83735 ASSAY OF MAGNESIUM: CPT

## 2025-05-16 PROCEDURE — 85025 COMPLETE CBC W/AUTO DIFF WBC: CPT

## 2025-05-16 PROCEDURE — 27000207 HC ISOLATION

## 2025-05-16 PROCEDURE — 27000221 HC OXYGEN, UP TO 24 HOURS

## 2025-05-16 PROCEDURE — 94640 AIRWAY INHALATION TREATMENT: CPT

## 2025-05-16 PROCEDURE — 25000242 PHARM REV CODE 250 ALT 637 W/ HCPCS

## 2025-05-16 PROCEDURE — 97164 PT RE-EVAL EST PLAN CARE: CPT

## 2025-05-16 PROCEDURE — 94761 N-INVAS EAR/PLS OXIMETRY MLT: CPT

## 2025-05-16 RX ORDER — POTASSIUM CHLORIDE 20 MEQ/1
40 TABLET, EXTENDED RELEASE ORAL ONCE
Status: COMPLETED | OUTPATIENT
Start: 2025-05-16 | End: 2025-05-16

## 2025-05-16 RX ORDER — SULFAMETHOXAZOLE AND TRIMETHOPRIM 800; 160 MG/1; MG/1
1 TABLET ORAL 2 TIMES DAILY
Status: DISCONTINUED | OUTPATIENT
Start: 2025-05-16 | End: 2025-05-20 | Stop reason: HOSPADM

## 2025-05-16 RX ADMIN — LEVALBUTEROL HYDROCHLORIDE 1.25 MG: 1.25 SOLUTION RESPIRATORY (INHALATION) at 07:05

## 2025-05-16 RX ADMIN — POTASSIUM CHLORIDE 40 MEQ: 1500 TABLET, EXTENDED RELEASE ORAL at 06:05

## 2025-05-16 RX ADMIN — HYDROCORTISONE SODIUM SUCCINATE 50 MG: 100 INJECTION, POWDER, FOR SOLUTION INTRAMUSCULAR; INTRAVENOUS at 09:05

## 2025-05-16 RX ADMIN — CLOPIDOGREL BISULFATE 75 MG: 75 TABLET, FILM COATED ORAL at 09:05

## 2025-05-16 RX ADMIN — ATORVASTATIN CALCIUM 80 MG: 40 TABLET, FILM COATED ORAL at 09:05

## 2025-05-16 RX ADMIN — FOLIC ACID 1 MG: 1 TABLET ORAL at 09:05

## 2025-05-16 RX ADMIN — DIPHENHYDRAMINE HYDROCHLORIDE 25 MG: 50 INJECTION INTRAMUSCULAR; INTRAVENOUS at 09:05

## 2025-05-16 RX ADMIN — HYDROCORTISONE SODIUM SUCCINATE 50 MG: 100 INJECTION, POWDER, FOR SOLUTION INTRAMUSCULAR; INTRAVENOUS at 06:05

## 2025-05-16 RX ADMIN — ALUMINUM HYDROXIDE AND MAGNESIUM HYDROXIDE 30 ML: 200; 200 SUSPENSION ORAL at 07:05

## 2025-05-16 RX ADMIN — PANTOPRAZOLE SODIUM 40 MG: 40 TABLET, DELAYED RELEASE ORAL at 09:05

## 2025-05-16 RX ADMIN — SULFAMETHOXAZOLE AND TRIMETHOPRIM 1 TABLET: 800; 160 TABLET ORAL at 12:05

## 2025-05-16 RX ADMIN — IPRATROPIUM BROMIDE 0.5 MG: 0.5 SOLUTION RESPIRATORY (INHALATION) at 07:05

## 2025-05-16 RX ADMIN — FERROUS SULFATE TAB 325 MG (65 MG ELEMENTAL FE) 1 EACH: 325 (65 FE) TAB at 12:05

## 2025-05-16 RX ADMIN — SIMETHICONE 80 MG: 80 TABLET, CHEWABLE ORAL at 09:05

## 2025-05-16 RX ADMIN — HYDROCORTISONE SODIUM SUCCINATE 50 MG: 100 INJECTION, POWDER, FOR SOLUTION INTRAMUSCULAR; INTRAVENOUS at 02:05

## 2025-05-16 RX ADMIN — QUETIAPINE FUMARATE 100 MG: 100 TABLET ORAL at 08:05

## 2025-05-16 RX ADMIN — GUAIFENESIN 200 MG: 100 SOLUTION ORAL at 08:05

## 2025-05-16 RX ADMIN — Medication 1000 UNITS: at 09:05

## 2025-05-16 RX ADMIN — RIVAROXABAN 20 MG: 10 TABLET, FILM COATED ORAL at 04:05

## 2025-05-16 RX ADMIN — SULFAMETHOXAZOLE AND TRIMETHOPRIM 1 TABLET: 800; 160 TABLET ORAL at 08:05

## 2025-05-16 NOTE — PT/OT/SLP RE-EVAL
Physical Therapy Re-Evaluation    Patient Name:  Ran Reyes Jr.   MRN:  [id number]    Recommendations     Therapy Intensity Recommendations at Discharge: Moderate Intensity Therapy  Discharge Equipment Recommendations: bath bench   Equipment to be obtained for discharge: TBD pending progress.  Barriers to discharge: fall risk    Assessment     Ran Reyes Jr. is a 69 y.o. male admitted with a medical diagnosis of <principal problem not specified>.  1. ELIZABETH (acute kidney injury)    2. Weakness    3. Dyspnea    4. Screening due    5. HFrEF (heart failure with reduced ejection fraction)    6. Acidemia    7. Microcytic anemia    8. Tobacco dependency    9. Acute on chronic combined systolic and diastolic congestive heart failure    10. Bradycardia    11. QT prolongation    12. Heart failure with reduced ejection fraction    13. Heart failure    14. At risk for long QT syndrome    15. Chest pain    16. Acute combined systolic and diastolic congestive heart failure    17. Nonrheumatic mitral valve regurgitation    18. Syncope       Problem List[1]   He presents with the following impairments/functional limitations:  impaired endurance, impaired functional mobility, impaired cardiopulmonary response to activity.    Rehab Prognosis: TBD pending progress.    Patient would benefit from continued skilled acute PT services to: address above listed impairments/functional limitations; receive patient/caregiver education; reduce fall risk; and maximize independency/safety with functional mobility.    Recent Surgery: * No surgery found *      Plan     During this hospitalization, patient to be seen 5 x/week to address the identified impairments/functional limitations via gait training, therapeutic activities, therapeutic exercises and progress toward the established goals.    Plan of Care Expires:   (Discharge)    Plan of Care reviewed with: patient    Subjective     Communicated with patient's nurse Saumya prior to  session.    Patient agreeable to participate in re-evaluation.    Chief Complaint: Some weakness in BLE upon initiating gait.  Patient/Family Comments/goals: None stated   Pain/Comfort:       Patients cultural, spiritual, Latter day conflicts given the current situation: no    Objective     Patient found with HOB elevated with PICC line, oxygen, telemetry (life vest)  upon PT entry to room.    General Precautions: Standard, contact, fall   Orthopedic Precautions:N/A   Braces: N/A    Respiratory Status: room air     92/60 mmHg MAP 71    Exams:  Orientation: Patient is oriented to person, place, time, situation  Commands: Patient follows commands consistently  BILAT UE ROM/strength - defer to OT - see OT note for details  RLE ROM: WFL  RLE Strength: WFL  LLE ROM: WFL  LLE Strength: WFL    Functional Mobility:    Bed Mobility:  Rolling Right: supervision  Supine to Sit: supervision  Sit to Supine: supervision    Transfers:  Sit to Stand: stand by assistance with rollator  Stand to Sit: stand by assistance with rollator    Gait:  Patient ambulated 230ft with rollator and contact guard assistance with 1 seated rest break and 2 standing rest breaks.   Patient demonstrates :       occasional unsteady gait       flexed posture      slowed, guarded, time consuming movements - all transitional activities.    Other Mobility:  N/A    Balance:  Sit  Static: NORMAL: No deviations seen in posture held statically  Dynamic: NORMAL: No deviations seen in posture held dynamically  Stand  Static: FAIR: Maintains without assist but unable to take challenges  Dynamic: FAIR: Needs CONTACT GUARD during gait    Additional Treatment Session  N/A    Patient left sitting edge of bed with all lines intact, call button in reach, and patient's nurse notified.    DME Justifications:  No DME recommended requiring DME justifications    Education     Patient educated on the importance of early mobility to prevent functional decline during hospital  stay.  Patient educated on and assisted with functional mobility as noted above.  Patient educated on PT Plan of Care and role of PT in acute care.  Patient was instructed to utilize staff assistance for mobility/transfers.  White board updated regarding patient's safest level of mobility with staff assistance    Goals     Multidisciplinary Problems       Physical Therapy Goals          Problem: Physical Therapy    Goal Priority Disciplines Outcome Interventions   Physical Therapy Goal     PT, PT/OT Progressing    Description: REVIEWED 05/10/2025  Goals to be met by: DISCHARGE  Patient will increase functional independence with mobility by performin. Sit <=> stand w/ RW at Elizabeth. - ONGOING  2. Bed <=> chair w/ RW at Elizabeth. - ONGOING  3. Ambulate 130' w/ RW at Elizabeth. - ONGOING                     History     Past Medical History:   Diagnosis Date    A-fib     Anticoagulant long-term use     Anxiety     Aortic aneurysm     Cataract     CHF (congestive heart failure)     Chronic atrial fibrillation     COPD (chronic obstructive pulmonary disease)     Coronary artery disease     Depression     HLD (hyperlipidemia)     Hypertension     Mitral regurgitation     PAD (peripheral artery disease)     Primary open angle glaucoma (POAG)      Past Surgical History:   Procedure Laterality Date    ANGIOGRAM, CORONARY, WITH LEFT HEART CATHETERIZATION N/A 2024    Procedure: Angiogram, Coronary, with Left Heart Cath;  Surgeon: Adriano Montiel MD;  Location: [facility] CATH LAB;  Service: Cardiology;  Laterality: N/A;    ATHERECTOMY OF PERIPHERAL VESSEL Left 2022    LEFT SFA ATHERECTOMY, BALLOON ANGIOPLASTY    CATARACT EXTRACTION W/  INTRAOCULAR LENS IMPLANT Left 2023    Procedure: EXTRACTION, CATARACT, WITH IOL INSERTION;  Surgeon: Georgi Borja MD;  Location: [facility];  Service: Ophthalmology;  Laterality: Left;  19.5  mac    EGD, WITH CLOSED BIOPSY  2024    Procedure: EGD, WITH CLOSED BIOPSY;  Surgeon:  Ronald Calderon MD;  Location: [facility] ENDOSCOPY;  Service: Gastroenterology;;    ESOPHAGOGASTRODUODENOSCOPY N/A 03/22/2024    Procedure: EGD;  Surgeon: Ronald Calderon MD;  Location: [facility] ENDOSCOPY;  Service: Gastroenterology;  Laterality: N/A;    Heart Stent N/A     > 10yrs. AGO    INCISION AND DRAINAGE N/A 03/18/2024    Procedure: Incision and Drainage;  Surgeon: Fahad Rivas MD;  Location: [facility];  Service: Urology;  Laterality: N/A;  I&D SCROTAL ABSCESS    INSERTION OF STENT INTO PERIPHERAL VESSEL Right 10/17/2022    RIGHT SFA ATHERECTOMY, BALLOON ANGIOPLASTY, STENT; RIGHT ANTERIOR TIBIAL ARTERY ATHERECTOMY, BALLOON ANGIOPLASTY    ORCHIECTOMY Left 03/18/2024    Procedure: ORCHIECTOMY;  Surgeon: Fahad Rivas MD;  Location: [facility];  Service: Urology;  Laterality: Left;     Time Tracking     PT Received On: 05/02/25  PT Start Time: 1353     PT Stop Time: 1419  PT Total Time (min): 26 mins    Billable Minutes: Re-eval 26 mins      05/16/2025       [1]   Patient Active Problem List  Diagnosis    Primary open angle glaucoma (POAG) of right eye, moderate stage    Combined forms of age-related cataract of left eye    Arteriosclerosis of coronary artery    Chronic atrial fibrillation    Dyslipidemia    Hypertension    Tobacco use    PVD (peripheral vascular disease)    VHD (valvular heart disease)    COVID-19    HFrEF (heart failure with reduced ejection fraction)    Nonrheumatic mitral valve regurgitation    Postoperative eye state    Scrotal hematoma    Chronic obstructive pulmonary disease, unspecified    Lesion of external ear    Low back pain    Other thrombophilia    Secondary hyperaldosteronism    Positive colorectal cancer screening using Cologuard test    Acute heart failure    History of COPD    CHF (congestive heart failure)    Acute cystitis with hematuria    Tobacco dependency    Moderate malnutrition

## 2025-05-16 NOTE — PROGRESS NOTES
[facility] - [address] [place] [address] Telemetry  Pulmonary Critical Care Note    Patient Name: Ran Reyes Jr.  MRN: [id number]  Admission Date: 4/21/2025  Hospital Length of Stay: 24 days  Code Status: Full Code  Attending Provider: Trisha German MD  Primary Care Provider: Abiodun Recinos DO     Subjective:     HPI:   Ran Reyes Jr. is a 69 y.o. 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 Patient reports he can only ambulate about 20-30 feet before having to stop due to claudication pain which is chronic and unchanged compared to baseline. Since running out of his diuretic medications approximately 5 days ago he has noted progressively worsening lower extremity swelling causing leg heaviness and weakness exacerbating difficulty ambulating.  He also notes acute on chronic cough productive of a brownish sputum without hemoptysis.  Denies fever, chills, sweats.  Denies chest pain.  Endorses shortness of breath at rest and with ambulation but unchanged compared to baseline.  Denies abdominal pain, nausea, vomiting, constipation, diarrhea.  Denies increased or decreased urinary frequency, dysuria, or hematuria.  Sleeps with 2 pillows at night due to baseline orthopnea. Has not had to increase number of pillows or change sleeping habits. Wears 2L NC home oxygen at baseline for hx of COPD. Denies having increased oxygen requirements prior to ED presentation.     ED course:  Vital signs stable.  Oxygenation  stable on 2 L nasal cannula.  CBC shows microcytic anemic (HGB 9.0; MCV 77).  CMP shows hyponatremia (Na 128), acidemia (CO2 14), elevated renal indices (BUN 25; creatinine 1.93), elevated alkaline phosphatase (). Troponin WNL.  BNP 26 39.5.  UDS negative.  EKG showed atrial fibrillation with PVCs.  Chest x-ray difficult to interpret due to overlying medical devices however appears to show cardiomegaly with bilateral pulmonary vascular congestion with question of bilateral pleural effusions. He was admitted for acute CHF exacerbation and cardiorenal syndrome.      Hospital Course/Significant events:  4/23/25: Admit to ICU, started on pressors and inotropes for cardiogenic / septic shock   5/2/25: Downgraded to    5/3/25: Re-upgraded to ICU 2/2 worsening lactate and agonal breathing  5/13/25: Weaned off dobutamine    24 Hour Interval History:  NAEON. Patient denies any acute complaints today. Vitals show patient BP at goal relative to patient EF. Remaining vitals stable. 350 cc UOP. He remains off dobutamine gtt at this time.  Stable microcytic anemia.  His CBC showed elevated white count at 14.83.  Patient's Dobbs remained in place..  CMP showed mild hyponatremia (stable) as well as potassium and magnesium below goal for CHF.     Past Medical History:   Diagnosis Date    A-fib     Anticoagulant long-term use     Anxiety     Aortic aneurysm     Cataract     CHF (congestive heart failure)     Chronic atrial fibrillation     COPD (chronic obstructive pulmonary disease)     Coronary artery disease     Depression     HLD (hyperlipidemia)     Hypertension     Mitral regurgitation     PAD (peripheral artery disease)     Primary open angle glaucoma (POAG)        Past Surgical History:   Procedure Laterality Date    ANGIOGRAM, CORONARY, WITH LEFT HEART CATHETERIZATION N/A 03/26/2024    Procedure: Angiogram, Coronary, with Left Heart Cath;  Surgeon: Adriano Montiel MD;  Location: [facility] CATH LAB;  Service: Cardiology;   Laterality: N/A;    ATHERECTOMY OF PERIPHERAL VESSEL Left 09/12/2022    LEFT SFA ATHERECTOMY, BALLOON ANGIOPLASTY    CATARACT EXTRACTION W/  INTRAOCULAR LENS IMPLANT Left 03/09/2023    Procedure: EXTRACTION, CATARACT, WITH IOL INSERTION;  Surgeon: Georgi Borja MD;  Location: [facility];  Service: Ophthalmology;  Laterality: Left;  19.5  mac    EGD, WITH CLOSED BIOPSY  03/22/2024    Procedure: EGD, WITH CLOSED BIOPSY;  Surgeon: Ronald Calderon MD;  Location: [facility] ENDOSCOPY;  Service: Gastroenterology;;    ESOPHAGOGASTRODUODENOSCOPY N/A 03/22/2024    Procedure: EGD;  Surgeon: Ronald Calderon MD;  Location: [facility] ENDOSCOPY;  Service: Gastroenterology;  Laterality: N/A;    Heart Stent N/A     > 10yrs. AGO    INCISION AND DRAINAGE N/A 03/18/2024    Procedure: Incision and Drainage;  Surgeon: Fahad Rivas MD;  Location: [facility];  Service: Urology;  Laterality: N/A;  I&D SCROTAL ABSCESS    INSERTION OF STENT INTO PERIPHERAL VESSEL Right 10/17/2022    RIGHT SFA ATHERECTOMY, BALLOON ANGIOPLASTY, STENT; RIGHT ANTERIOR TIBIAL ARTERY ATHERECTOMY, BALLOON ANGIOPLASTY    ORCHIECTOMY Left 03/18/2024    Procedure: ORCHIECTOMY;  Surgeon: Fahad Rivas MD;  Location: [facility];  Service: Urology;  Laterality: Left;       Social History[1]    Current Outpatient Medications   Medication Instructions    acetaminophen 650 mg, 2 times daily PRN    ALBUTEROL INHL 2 puffs, Every 6 hours PRN    albuterol-ipratropium (DUO-NEB) 2.5 mg-0.5 mg/3 mL nebulizer solution 3 mLs, Nebulization, Every 6 hours PRN, Rescue    aspirin (ECOTRIN) 81 MG EC tablet 1 tablet, Daily    atorvastatin (LIPITOR) 80 mg, Oral, Daily    BREZTRI AEROSPHERE 160-9-4.8 mcg/actuation HFAA 2 puffs, 2 times daily    cetirizine (ZYRTEC) 10 mg, Oral, Daily    clopidogreL (PLAVIX) 75 mg, Oral, Daily    folic acid (FOLVITE) 1 mg, Oral, Daily    furosemide (LASIX) 40 mg, Oral, 2 times daily    gabapentin (NEURONTIN) 300 mg, Oral, 3 times  daily    metoprolol succinate (TOPROL-XL) 12.5 mg, Oral, Daily    nicotine (NICODERM CQ) 14 mg/24 hr 1 patch, Transdermal, Daily PRN    nicotine (NICODERM CQ) 21 mg/24 hr 1 patch, Transdermal, Daily    nitrofurantoin, macrocrystal-monohydrate, (MACROBID) 100 MG capsule 100 mg, Oral, 2 times daily    olopatadine (PATANOL) 0.1 % ophthalmic solution Apply to eye.    pantoprazole (PROTONIX) 40 mg, Oral, 2 times daily    potassium chloride SA (K-DUR,KLOR-CON) 20 MEQ tablet 20 mEq, Oral, 2 times daily    rivaroxaban (XARELTO) 20 mg, Oral, Daily    sacubitriL-valsartan (ENTRESTO) 49-51 mg per tablet 1 tablet, Daily    sertraline (ZOLOFT) 100 mg, Oral, Daily    urea (CARMOL) 40 % Crea Apply topically.    varenicline tartrate (CHANTIX) 0.5 mg    vitamin D (VITAMIN D3) 1,000 Units, Daily     Current Inpatient Medications   atorvastatin  80 mg Oral Daily    clopidogreL  75 mg Oral Daily    ferrous sulfate  1 tablet Oral Q48H    folic acid  1 mg Oral Daily    hydrocortisone sodium succinate  50 mg Intravenous Q8H    ipratropium  0.5 mg Nebulization Q12H    levalbuterol  1.25 mg Nebulization Q12H    miconazole NITRATE 2 %   Topical (Top) BID    pantoprazole  40 mg Oral Daily    QUEtiapine  100 mg Oral QHS    rivaroxaban  20 mg Oral Daily    vitamin D  1,000 Units Oral Daily     Current Intravenous Infusions   0.9% NaCl   Intravenous Continuous   Stopped at 05/04/25 5772     ROS:   Review of Systems   Constitutional:  Negative for chills, diaphoresis, fever and malaise/fatigue.   HENT:  Negative for sore throat and tinnitus.    Eyes:  Negative for pain, discharge and redness.   Respiratory:  Negative for cough, hemoptysis, sputum production, shortness of breath and wheezing.    Cardiovascular:  Positive for leg swelling. Negative for chest pain, palpitations, orthopnea and claudication.   Gastrointestinal:  Negative for abdominal pain, blood in stool, constipation, diarrhea, melena, nausea and vomiting.   Genitourinary:  Negative  for dysuria, flank pain, frequency, hematuria and urgency.   Musculoskeletal:  Negative for joint pain and myalgias.   Neurological:  Negative for weakness and headaches.           Objective:     Intake/Output Summary (Last 24 hours) at 5/16/2025 0959  Last data filed at 5/15/2025 1300  Gross per 24 hour   Intake --   Output 350 ml   Net -350 ml     Vital Signs (Most Recent):  Temp: 98.2 °F (36.8 °C) (05/16/25 0748)  Pulse: 62 (05/16/25 0748)  Resp: 20 (05/16/25 0730)  BP: 96/66 (05/16/25 0748)  SpO2: 100 % (05/16/25 0748)  Body mass index is 25.41 kg/m².  Weight: 85 kg (187 lb 6.3 oz) Vital Signs (24h Range):  Temp:  [96 °F (35.6 °C)-98.3 °F (36.8 °C)] 98.2 °F (36.8 °C)  Pulse:  [51-86] 62  Resp:  [16-20] 20  SpO2:  [94 %-100 %] 100 %  BP: (77-99)/(46-70) 96/66     Physical Exam:  Physical Exam  Constitutional:       General: He is not in acute distress.     Appearance: Normal appearance. He is not ill-appearing.   HENT:      Head: Normocephalic and atraumatic.   Eyes:      General: No scleral icterus.        Right eye: No discharge.         Left eye: No discharge.      Extraocular Movements: Extraocular movements intact.      Conjunctiva/sclera: Conjunctivae normal.      Pupils: Pupils are equal, round, and reactive to light.   Cardiovascular:      Rate and Rhythm: Normal rate. Rhythm irregular.      Pulses: Normal pulses.      Heart sounds: Murmur heard.      No friction rub. No gallop.   Pulmonary:      Effort: Pulmonary effort is normal. No respiratory distress.      Breath sounds: Normal breath sounds. No stridor. No wheezing, rhonchi or rales.   Abdominal:      General: Abdomen is flat. Bowel sounds are normal. There is no distension.      Palpations: Abdomen is soft.      Tenderness: There is no abdominal tenderness. There is no guarding.   Musculoskeletal:         General: Swelling present. Normal range of motion.      Right lower leg: Edema present.      Left lower leg: Edema present.      Comments: 1+  "pitting edema to BLE   Skin:     General: Skin is warm and dry.   Neurological:      General: No focal deficit present.      Mental Status: He is alert and oriented to person, place, and time.           Lines/Drains/Airways       Peripheral Intravenous Line  Duration                  Peripheral IV - Single Lumen 05/15/25 0505 20 G 2 1/4 in Yes Anterior;Left Forearm 1 day                  Significant Labs:  Lab Results   Component Value Date    WBC 14.83 (H) 05/16/2025    HGB 9.3 (L) 05/16/2025    HCT 29.5 (L) 05/16/2025    MCV 78.0 (L) 05/16/2025     05/16/2025       BMP  Lab Results   Component Value Date     (L) 05/16/2025    K 3.6 05/16/2025    CO2 23 05/16/2025    BUN 47.7 (H) 05/16/2025    CREATININE 1.21 05/16/2025    CALCIUM 9.0 05/16/2025    AGAP 11.0 05/16/2025    EGFRNONAA 88 (L) 12/06/2021     ABG  No results for input(s): "PH", "PO2", "PCO2", "HCO3", "BE" in the last 168 hours.    Mechanical Ventilation Support:  Oxygen Concentration (%): 28 (05/12/25 0044)    Significant Imaging:  I have reviewed the pertinent imaging within the past 24 hours.    Assessment/Plan:     Cardiogenic Shock   NICM-HFrEF with pulmonary hypertension   NOCAD, NICMO  Hx of VT arrest (on life vest)  Chronic, persistent A-fib   Cardiorenal syndrome (improved)  Prolonged QT  -patient was downgraded from the ICU 05/14/2025.    -TTE showed 22% with PASP 59 mm Hg and grade 3 diastolic dysfunction  -nonobstructive coronary artery disease was noted on 03/2024 left heart catheterization.  -persistent AFib continued on 05/14/2025 EKG.    -patient had brief period time where he had defibrillator leads attached due to bedbug infestation of [employer] wearable defibrillator.    -patient has been followed by Cardiology.  Appreciate all recommendations.    -patient has a history of not being able to tolerate GDM T in attempts to start beta-blocker have not been successful due to patient not being able to tolerate.  Patient is " hypotensive off of all blood pressure lowering therapies.  -cardiology recommends Bumex 2 mg oral today and assess UO P response  -cardiology notes need for advanced HF evaluation outpatient  -continue anticoagulation for CVA risk reduction in setting of atrial fibrillation.  Also recommend SAPT and high-intensity statin therapy.  -continue strict in's out's.    Adrenal Insufficiency   -low cortisol was noted on cord stem test and normal past ACTH level.    -consider MRI brain inpatient versus outpatient.    -on hydrocortisone 50 mg Q 8 hours.  Convert to oral prednisone prior to discharge.    Hypokalemia   -patient continues to diurese and experience hypokalemia.    -continue electrolyte goals: K> 4 Mag > 3 Phos > 2    COPD  -patient's respiratory status is stable at this time.    -continue ipratropium nebulizer solution 0.5 mg Q 8 hours    Groin Itch   -patient is continued on miconazole topical powder .    -patient has diphenhydramine 25 mg IV on board for itching.    Anxiety/insomnia   -patient is continued on Quetiapine 100 mg.    -patient was evaluated for level 2 screening for placement.    -diagnosis per psychiatry was unspecified depressive disorder.  Not requiring PVC.  Recommended continuing trazodone 25 mg p.o. nightly p.r.n. for insomnia.       Marino Marquez DO  [employer], Internal Medicine, PGY-I         [1]   Social History  Socioeconomic History    Marital status: Single   Tobacco Use    Smoking status: Every Day     Current packs/day: 0.50     Average packs/day: 0.8 packs/day for 50.4 years (40.9 ttl pk-yrs)     Types: Cigarettes     Start date: 1975     Passive exposure: Current    Smokeless tobacco: Never    Tobacco comments:     States smoke 2-3 cigarettes per day   Substance and Sexual Activity    Alcohol use: Yes     Alcohol/week: 3.0 standard drinks of alcohol     Types: 3 Cans of beer per week     Comment: socially    Drug use: Not Currently     Frequency: 1.0 times per week     Types: Marijuana      Comment: no use in over 1 year    Sexual activity: Not Currently     Partners: Female     Social Drivers of Health     Financial Resource Strain: Low Risk  (4/23/2025)    Overall Financial Resource Strain (CARDIA)     Difficulty of Paying Living Expenses: Not hard at all   Food Insecurity: No Food Insecurity (4/23/2025)    Hunger Vital Sign     Worried About Running Out of Food in the Last Year: Never true     Ran Out of Food in the Last Year: Never true   Transportation Needs: No Transportation Needs (4/23/2025)    PRAPARE - Transportation     Lack of Transportation (Medical): No     Lack of Transportation (Non-Medical): No   Physical Activity: Inactive (12/17/2024)    Exercise Vital Sign     Days of Exercise per Week: 0 days     Minutes of Exercise per Session: 0 min   Stress: No Stress Concern Present (4/23/2025)    [place] [employer] - Occupational Stress Questionnaire     Feeling of Stress : Not at all   Housing Stability: Low Risk  (4/23/2025)    Housing Stability Vital Sign     Unable to Pay for Housing in the Last Year: No     Homeless in the Last Year: No

## 2025-05-16 NOTE — PT/OT/SLP PROGRESS
Occupational Therapy      Patient Name:  Ran Reyes Jr.   MRN:  [id number]    Patient not seen today secondary to Patient unwilling to participate, Increased agitation, Toileting, Pain.   OT attempted at 0934: Pt on BSC,  per nursing, OT may enter room to assist pt on BSC, pt completed toileting tasks prior to OT entering room,  Pt c/o back pain and stomach pain but insisted on remaining on BSC. pt increased agitation toward therapist with racial comments in disrespectful manner. OT exited room, pt with call button in reach.    5/16/2025

## 2025-05-16 NOTE — PLAN OF CARE
Problem: Skin Injury Risk Increased  Goal: Skin Health and Integrity  Outcome: Progressing     Problem: Adult Inpatient Plan of Care  Goal: Plan of Care Review  Outcome: Progressing  Goal: Patient-Specific Goal (Individualized)  Outcome: Progressing  Goal: Absence of Hospital-Acquired Illness or Injury  Outcome: Progressing  Goal: Optimal Comfort and Wellbeing  Outcome: Progressing  Goal: Readiness for Transition of Care  Outcome: Progressing     Problem: COPD (Chronic Obstructive Pulmonary Disease)  Goal: Optimal Chronic Illness Coping  Outcome: Progressing  Goal: Optimal Level of Functional [place]  Outcome: Progressing  Goal: Absence of Infection Signs and Symptoms  Outcome: Progressing  Goal: Improved Oral Intake  Outcome: Progressing     Problem: Heart Failure  Goal: Optimal Coping  Outcome: Progressing  Goal: Optimal Cardiac Output  Outcome: Progressing  Goal: Stable Heart Rate and Rhythm  Outcome: Progressing  Goal: Optimal Functional Ability  Outcome: Progressing  Goal: Fluid and Electrolyte Balance  Outcome: Progressing  Goal: Improved Oral Intake  Outcome: Progressing     Problem: Fall Injury Risk  Goal: Absence of Fall and Fall-Related Injury  Outcome: Progressing     Problem: Wound  Goal: Optimal Coping  Outcome: Progressing  Goal: Optimal Functional Ability  Outcome: Progressing  Goal: Absence of Infection Signs and Symptoms  Outcome: Progressing  Goal: Improved Oral Intake  Outcome: Progressing  Goal: Optimal Pain Control and Function  Outcome: Progressing  Goal: Skin Health and Integrity  Outcome: Progressing  Goal: Optimal Wound Healing  Outcome: Progressing     Problem: Acute Kidney Injury/Impairment  Goal: Fluid and Electrolyte Balance  Outcome: Progressing  Goal: Improved Oral Intake  Outcome: Progressing  Goal: Effective Renal Function  Outcome: Progressing     Problem: Dysrhythmia  Goal: Normalized Cardiac Rhythm  Outcome: Progressing     Problem: Infection  Goal: Absence of Infection  Signs and Symptoms  Outcome: Progressing

## 2025-05-16 NOTE — PLAN OF CARE
Problem: Skin Injury Risk Increased  Goal: Skin Health and Integrity  Outcome: Progressing     Problem: Adult Inpatient Plan of Care  Goal: Plan of Care Review  Outcome: Progressing  Goal: Patient-Specific Goal (Individualized)  Outcome: Progressing  Goal: Absence of Hospital-Acquired Illness or Injury  Outcome: Progressing  Goal: Optimal Comfort and Wellbeing  Outcome: Progressing  Goal: Readiness for Transition of Care  Outcome: Progressing     Problem: COPD (Chronic Obstructive Pulmonary Disease)  Goal: Optimal Chronic Illness Coping  Outcome: Progressing  Goal: Optimal Level of Functional [place]  Outcome: Progressing  Goal: Absence of Infection Signs and Symptoms  Outcome: Progressing  Goal: Improved Oral Intake  Outcome: Progressing     Problem: Heart Failure  Goal: Optimal Coping  Outcome: Progressing  Goal: Optimal Cardiac Output  Outcome: Progressing  Goal: Stable Heart Rate and Rhythm  Outcome: Progressing  Goal: Optimal Functional Ability  Outcome: Progressing  Goal: Fluid and Electrolyte Balance  Outcome: Progressing  Goal: Improved Oral Intake  Outcome: Progressing     Problem: Fall Injury Risk  Goal: Absence of Fall and Fall-Related Injury  Outcome: Progressing     Problem: Wound  Goal: Optimal Coping  Outcome: Progressing  Goal: Optimal Functional Ability  Outcome: Progressing  Goal: Absence of Infection Signs and Symptoms  Outcome: Progressing  Goal: Improved Oral Intake  Outcome: Progressing  Goal: Optimal Pain Control and Function  Outcome: Progressing  Goal: Skin Health and Integrity  Outcome: Progressing  Goal: Optimal Wound Healing  Outcome: Progressing     Problem: Acute Kidney Injury/Impairment  Goal: Fluid and Electrolyte Balance  Outcome: Progressing  Goal: Improved Oral Intake  Outcome: Progressing  Goal: Effective Renal Function  Outcome: Progressing     Problem: Dysrhythmia  Goal: Normalized Cardiac Rhythm  Outcome: Progressing     Problem: Infection  Goal: Absence of Infection  Signs and Symptoms  Outcome: Progressing

## 2025-05-16 NOTE — PROGRESS NOTES
Cardiology Progress Note     Cardiology Attending: Dayanna Johnson MD  Cardiology Fellow: Vernon Cifuentes MD     Date of Admit: 4/21/2025    History of Present Illness:      Ran Reyes Jr. is a 69 y.o. male with NICM-HFrEF (LVEF 20-25%; G3DD), HTN, non obstructive CAD, AF on OAC, PAD, HLD, and COPD who presented with acute on chronic decompensated heart failure in cardiogenic shock. Cardiogenic shock is now resolved.     No acute events overnight. The patient is without acute complaint this morning.    Denies symptoms from hypotension while supine but reported lightheadedness and dizziness while working with PT yesterday after initiation of BB. Not tolerating GDMT.    Worsening leukocytosis (afebrile) and stable microcytic anemia.       Past Medical History:     Past Medical History:   Diagnosis Date    A-fib     Anticoagulant long-term use     Anxiety     Aortic aneurysm     Cataract     CHF (congestive heart failure)     Chronic atrial fibrillation     COPD (chronic obstructive pulmonary disease)     Coronary artery disease     Depression     HLD (hyperlipidemia)     Hypertension     Mitral regurgitation     PAD (peripheral artery disease)     Primary open angle glaucoma (POAG)      Surgical History:     Past Surgical History:   Procedure Laterality Date    ANGIOGRAM, CORONARY, WITH LEFT HEART CATHETERIZATION N/A 03/26/2024    Procedure: Angiogram, Coronary, with Left Heart Cath;  Surgeon: Adriano Montiel MD;  Location: [facility] CATH LAB;  Service: Cardiology;  Laterality: N/A;    ATHERECTOMY OF PERIPHERAL VESSEL Left 09/12/2022    LEFT SFA ATHERECTOMY, BALLOON ANGIOPLASTY    CATARACT EXTRACTION W/  INTRAOCULAR LENS IMPLANT Left 03/09/2023    Procedure: EXTRACTION, CATARACT, WITH IOL INSERTION;  Surgeon: Georgi Borja MD;  Location: [facility];  Service: Ophthalmology;  Laterality: Left;  19.5  mac    EGD, WITH CLOSED BIOPSY  03/22/2024    Procedure: EGD, WITH CLOSED BIOPSY;  Surgeon: Ronald Calderon MD;   Location: [facility] ENDOSCOPY;  Service: Gastroenterology;;    ESOPHAGOGASTRODUODENOSCOPY N/A 03/22/2024    Procedure: EGD;  Surgeon: Ronald Calderon MD;  Location: [facility] ENDOSCOPY;  Service: Gastroenterology;  Laterality: N/A;    Heart Stent N/A     > 10yrs. AGO    INCISION AND DRAINAGE N/A 03/18/2024    Procedure: Incision and Drainage;  Surgeon: Fahad Rivas MD;  Location: [facility];  Service: Urology;  Laterality: N/A;  I&D SCROTAL ABSCESS    INSERTION OF STENT INTO PERIPHERAL VESSEL Right 10/17/2022    RIGHT SFA ATHERECTOMY, BALLOON ANGIOPLASTY, STENT; RIGHT ANTERIOR TIBIAL ARTERY ATHERECTOMY, BALLOON ANGIOPLASTY    ORCHIECTOMY Left 03/18/2024    Procedure: ORCHIECTOMY;  Surgeon: Fahad Rivas MD;  Location: [facility];  Service: Urology;  Laterality: Left;     Allergies:   Review of patient's allergies indicates:  No Known Allergies  Family History:     Family History   Problem Relation Name Age of Onset    Cancer Mother      Hypertension Mother      Heart failure Father      Stroke Father      Hypertension Father      No Known Problems Sister       Social History:   Social History[1]  Review of Systems:     The patient denies headaches, changes in vision, nausea, emesis, fevers, chills, chest pain, dyspnea, palpitations, abdominal pain, or changes in urinary or bowel habits.    Reports dizziness and lightheadedness.      Medications:     Home Medications:  Prior to Admission medications    Medication Sig Start Date End Date Taking? Authorizing Provider   aspirin (ECOTRIN) 81 MG EC tablet Take 1 tablet by mouth once daily. 3/14/25  Yes Provider, Historical   olopatadine (PATANOL) 0.1 % ophthalmic solution Apply to eye. 2/7/25  Yes Provider, Historical   sacubitriL-valsartan (ENTRESTO) 49-51 mg per tablet Take 1 tablet by mouth once daily. 2/12/25  Yes Provider, Historical   urea (CARMOL) 40 % Crea Apply topically. 2/13/25  Yes Provider, Historical   varenicline tartrate (CHANTIX) 1 mg  Tab Take 0.5 mg by mouth. 2/13/25  Yes Provider, Historical   acetaminophen 325 mg Cap Take 650 mg by mouth 2 (two) times daily as needed.    Provider, Historical   ALBUTEROL INHL Inhale 2 puffs into the lungs every 6 (six) hours as needed.    Provider, Historical   albuterol-ipratropium (DUO-NEB) 2.5 mg-0.5 mg/3 mL nebulizer solution Take 3 mLs by nebulization every 6 (six) hours as needed for Wheezing. Rescue 12/9/24   Diana Hassan MD   atorvastatin (LIPITOR) 80 MG tablet Take 1 tablet (80 mg total) by mouth once daily. 3/4/25 3/4/26  Marino Marquez DO   BREZTRI AEROSPHERE 160-9-4.8 mcg/actuation HFAA Inhale 2 puffs into the lungs 2 (two) times daily. 1/7/25   Provider, Historical   cetirizine (ZYRTEC) 10 MG tablet Take 1 tablet (10 mg total) by mouth once daily. 8/8/24 8/3/25  Abiodun Recinos DO   clopidogreL (PLAVIX) 75 mg tablet Take 1 tablet (75 mg total) by mouth once daily. 3/4/25   Mraino Marquez DO   folic acid (FOLVITE) 1 MG tablet Take 1 tablet (1 mg total) by mouth once daily. 4/9/24 4/9/25  Tha Edmond MD   furosemide (LASIX) 40 MG tablet Take 1 tablet (40 mg total) by mouth 2 (two) times a day. 3/4/25   Marino Marquez DO   gabapentin (NEURONTIN) 300 MG capsule Take 300 mg by mouth 3 (three) times daily.    Provider, Historical   metoprolol succinate (TOPROL-XL) 25 MG 24 hr tablet Take 0.5 tablets (12.5 mg total) by mouth once daily. 3/4/25 3/4/26  Marino Marquez DO   nicotine (NICODERM CQ) 14 mg/24 hr Place 1 patch onto the skin daily as needed. 3/4/25   Marino Marquez DO   nicotine (NICODERM CQ) 21 mg/24 hr Place 1 patch onto the skin once daily. 3/20/25   Heaven Page MD   nitrofurantoin, macrocrystal-monohydrate, (MACROBID) 100 MG capsule Take 1 capsule (100 mg total) by mouth 2 (two) times daily. 3/4/25   Marino Marquez DO   pantoprazole (PROTONIX) 40 MG tablet Take 1 tablet (40 mg total) by mouth 2 (two) times a day. 9/23/24   Abiodun Recinos,    potassium chloride SA (K-DUR,KLOR-CON)  20 MEQ tablet Take 1 tablet (20 mEq total) by mouth 2 (two) times daily. 1/28/25 1/28/26  Vernon Cifuentes MD   rivaroxaban (XARELTO) 20 mg Tab Take 1 tablet (20 mg total) by mouth Daily. 3/4/25   Marino Marquez DO   sertraline (ZOLOFT) 100 MG tablet Take 100 mg by mouth once daily.    Provider, Historical   vitamin D (VITAMIN D3) 1000 units Tab Take 1,000 Units by mouth once daily.    Provider, Historical       Current/Inpatient Medications:  Infusions:   0.9% NaCl   Intravenous Continuous   Stopped at 05/04/25 3546     Scheduled:   atorvastatin  80 mg Oral Daily    clopidogreL  75 mg Oral Daily    ferrous sulfate  1 tablet Oral Q48H    folic acid  1 mg Oral Daily    hydrocortisone sodium succinate  50 mg Intravenous Q8H    ipratropium  0.5 mg Nebulization Q12H    levalbuterol  1.25 mg Nebulization Q12H    miconazole NITRATE 2 %   Topical (Top) BID    pantoprazole  40 mg Oral Daily    QUEtiapine  100 mg Oral QHS    rivaroxaban  20 mg Oral Daily    vitamin D  1,000 Units Oral Daily     PRN:    Current Facility-Administered Medications:     acetaminophen, 650 mg, Oral, Q4H PRN    aluminum-magnesium hydroxide, 30 mL, Oral, BID PRN    dextrose 50%, 12.5 g, Intravenous, PRN    dextrose 50%, 25 g, Intravenous, PRN    diphenhydrAMINE, 25 mg, Intravenous, Q6H PRN    glucagon (human recombinant), 1 mg, Intramuscular, PRN    glucose, 16 g, Oral, PRN    glucose, 24 g, Oral, PRN    guaiFENesin 100 mg/5 ml, 200 mg, Oral, Q4H PRN    hydrALAZINE, 10 mg, Intravenous, Q4H PRN    hydrocortisone, , Topical (Top), TID PRN    insulin aspart U-100, 0-5 Units, Subcutaneous, QID (AC + HS) PRN    lorazepam, 1 mg, Intravenous, Q6H PRN    melatonin, 6 mg, Oral, Nightly PRN    naloxone, 0.02 mg, Intravenous, PRN    nicotine, 1 patch, Transdermal, Daily PRN    polyethylene glycol, 17 g, Oral, Daily PRN    prochlorperazine, 5 mg, Intravenous, Q6H PRN    senna-docusate, 1 tablet, Oral, BID PRN    simethicone, 1 tablet, Oral, TID PRN    sodium  chloride 0.9%, 10 mL, Intravenous, PRN    traZODone, 25 mg, Oral, Nightly PRN    Objective:     24-hour Vitals:   Temp:  [97.7 °F (36.5 °C)-98.3 °F (36.8 °C)] 97.8 °F (36.6 °C)  Pulse:  [57-93] 73  Resp:  [12-29] 17  SpO2:  [58 %-100 %] 100 %  BP: ()/(44-70) 93/67    Vitals: BP 93/67   Pulse 73   Temp 97.8 °F (36.6 °C) (Oral)   Resp 17   Ht 6' (1.829 m)   Wt 85 kg (187 lb 6.3 oz)   SpO2 100%   BMI 25.41 kg/m²     Intake/Output Summary (Last 24 hours) at 5/15/2025 1941  Last data filed at 5/15/2025 1300  Gross per 24 hour   Intake 500 ml   Output 1125 ml   Net -625 ml       Physical Exam  Vitals reviewed.   Constitutional:       General: He is not in acute distress.     Appearance: Normal appearance. He is normal weight. He is not ill-appearing.   HENT:      Head: Normocephalic and atraumatic.      Right Ear: External ear normal.      Left Ear: External ear normal.      Nose: Nose normal.   Eyes:      Conjunctiva/sclera: Conjunctivae normal.   Cardiovascular:      Rate and Rhythm: Normal rate. Rhythm irregular.      Pulses: Normal pulses.      Heart sounds: Murmur heard.   Pulmonary:      Effort: Pulmonary effort is normal. No respiratory distress.      Breath sounds: No stridor. No wheezing, rhonchi or rales.   Chest:      Chest wall: No tenderness.   Abdominal:      General: Bowel sounds are normal. There is no distension.      Palpations: Abdomen is soft.   Musculoskeletal:      Cervical back: Neck supple.      Right lower leg: Edema present.      Left lower leg: Edema present.      Comments: Trace LE edema.    Skin:     General: Skin is warm and dry.      Capillary Refill: Capillary refill takes less than 2 seconds.   Neurological:      Mental Status: He is alert and oriented to person, place, and time.   Psychiatric:         Mood and Affect: Mood normal.         Behavior: Behavior normal.        Labs:   I have reviewed the following labs below:    Recent Labs   Lab 05/13/25  0253 05/13/25  3504  05/14/25  0329 05/14/25  0929 05/15/25  0323 05/15/25  0902 05/15/25  1214 05/15/25  1553   WBC 8.38  --  11.76*  --  14.74*  --   --   --    HGB 7.8*  --  8.3*  --  8.8*  --   --   --    HCT 24.6*  --  25.3*  --  27.7*  --   --   --      --  164  --  192  --   --   --    *   < > 133*   < > 131* 132* 133* 133*   K 4.3   < > 3.3*   < > 3.5 3.5 3.8 3.8   CL 96*   < > 99   < > 98 98 98 99   CO2 25   < > 24   < > 22* 23 24 24   BUN 42.3*   < > 42.8*   < > 52.8* 48.7* 49.7* 47.7*   CREATININE 1.06   < > 0.97   < > 1.19 1.14 1.18 1.05   *   < > 158*   < > 151* 151* 148* 127*   CALCIUM 9.5   < > 9.2   < > 9.3 9.4 9.6 9.4   MG 1.80  1.90   < > 1.80   < > 1.80 1.80 2.30 2.30   PHOS 3.3  3.3   < > 3.6   < > 3.9 3.8 3.6 3.6   PROT 7.2  --   --   --   --   --   --   --    ALBUMIN 3.1*  --   --   --   --   --   --   --    BILITOT 4.6*  --   --   --   --   --   --   --    AST 49*  --   --   --   --   --   --   --    ALT 48  --   --   --   --   --   --   --    ALKPHOS 246*  --   --   --   --   --   --   --    TROPONINI 0.018  --   --   --   --   --   --   --     < > = values in this interval not displayed.     Cardiovascular Studies/Imaging:   I have reviewed the following studies below:  TTE:   Summary  Show Result Comparison     Left Ventricle: The left ventricle is severely dilated. Severely increased ventricular mass. Mildly increased wall thickness. There is severe eccentric hypertrophy. Severe global hypokinesis present. There is severely reduced systolic function. Quantitated ejection fraction is 22%. Grade III diastolic dysfunction.    Right Ventricle: The right ventricle has moderate enlargement. Systolic function is moderately reduced.    Left Atrium: Left atrium is severely dilated measuring 92ml/m2.    Right Atrium: Right atrium is severely dilated.    Aortic Valve: The aortic valve is a trileaflet valve. There is mild aortic valve sclerosis. There is no stenosis. There is no significant  regurgitation.    Mitral Valve: The mitral valve is structurally normal. There is no stenosis. There is severe central regurgitation.    Tricuspid Valve: The tricuspid valve is structurally normal. There is moderate regurgitation.    Pulmonary Artery: There is moderate pulmonary hypertension. The estimated pulmonary artery systolic pressure is 59 mmHg.    IVC/SVC: Elevated venous pressure at 15 mmHg.    Pericardium: There is no pericardial effusion.    LHC/Cors: 3/26/2024  Coronary findings:     Dominance: right   Left main:  10-20% calcified distal left main disease  Left anterior descending artery:  50% calcified proximal stenosis  Circumflex artery:  Luminal irregularities  Right coronary artery:  Luminal irregularities    Imaging:   See imaging section for details.     Assessment:     Ran Reyes Jr. is a 69 y.o. male with NICM-HFrEF (LVEF 20-25%; G3DD), HTN, non obstructive CAD, AF on OAC, PAD, HLD, and COPD who presented with acute on chronic decompensated heart failure in cardiogenic shock now resolved.     Plan/Recommendations:   NICM-HFrEF with Resolved CS and Severe Mitral Regurgitation   -Unable to tolerate GDMT. Symptomatic with attempts to start BB. Hypotensive off of therapies or diuretics.   -Trial Bumex 1 mg oral tomorrow and assess UOP response.    -Advanced HF evaluation outpatient. [facility] Dr. Jose Angel Kuo.  -LifeVest prior to discharge.     Atrial Fibrillation  -Unable to tolerate BB.   -Continue anticoagulation for CVA risk reduction.     Hypertension  -Hypotensive. See above explanation.      Non Obstructive Epicardial Coronary Artery Disease  Peripheral Artery Disease  -SAPT and high intensity statin therapy.     Vernon Cifuentes MD  [facility] Chief Cardiology Fellow, PGY-6  [facility]                [1]   Social History  Tobacco Use    Smoking status: Every Day     Current packs/day: 0.50     Average packs/day: 0.8 packs/day for 50.4 years (40.9 ttl pk-yrs)     Types: Cigarettes      Start date: 1975     Passive exposure: Current    Smokeless tobacco: Never    Tobacco comments:     States smoke 2-3 cigarettes per day   Substance Use Topics    Alcohol use: Yes     Alcohol/week: 3.0 standard drinks of alcohol     Types: 3 Cans of beer per week     Comment: socially    Drug use: Not Currently     Frequency: 1.0 times per week     Types: Marijuana     Comment: no use in over 1 year

## 2025-05-16 NOTE — PROGRESS NOTES
Cardiology Progress Note     Cardiology Attending: Dayanna Johnson MD  Cardiology Fellow: Vernon Cifuentes MD     Date of Admit: 4/21/2025    History of Present Illness:      Ran Reyes Jr. is a 69 y.o. male with NICM-HFrEF (LVEF 20-25%; G3DD), HTN, non obstructive CAD, AF on OAC, PAD, HLD, and COPD who presented with acute on chronic decompensated heart failure in cardiogenic shock. Cardiogenic shock is now resolved.     No acute events overnight. The patient is without acute complaint this morning.    Hypotensive but asymptomatic. Reports he walked 4 laps in [place] without pre-syncopal symptoms.   Persistent leukocytosis, stable normocytic anemia - unremarkable BMP except marginal hyponatremia.   Negative 350 cc yesterday (unsure of accuracy) - no weight obtained since 5/13/25.  Received Bumex 1 mg oral yesterday - hemodynamically tolerated - reports poor diuretic effect.       Past Medical History:     Past Medical History:   Diagnosis Date    A-fib     Anticoagulant long-term use     Anxiety     Aortic aneurysm     Cataract     CHF (congestive heart failure)     Chronic atrial fibrillation     COPD (chronic obstructive pulmonary disease)     Coronary artery disease     Depression     HLD (hyperlipidemia)     Hypertension     Mitral regurgitation     PAD (peripheral artery disease)     Primary open angle glaucoma (POAG)      Surgical History:     Past Surgical History:   Procedure Laterality Date    ANGIOGRAM, CORONARY, WITH LEFT HEART CATHETERIZATION N/A 03/26/2024    Procedure: Angiogram, Coronary, with Left Heart Cath;  Surgeon: Adriano Montiel MD;  Location: [facility] CATH LAB;  Service: Cardiology;  Laterality: N/A;    ATHERECTOMY OF PERIPHERAL VESSEL Left 09/12/2022    LEFT SFA ATHERECTOMY, BALLOON ANGIOPLASTY    CATARACT EXTRACTION W/  INTRAOCULAR LENS IMPLANT Left 03/09/2023    Procedure: EXTRACTION, CATARACT, WITH IOL INSERTION;  Surgeon: Georgi Borja MD;  Location: [facility];  Service: Ophthalmology;   Laterality: Left;  19.5  mac    EGD, WITH CLOSED BIOPSY  03/22/2024    Procedure: EGD, WITH CLOSED BIOPSY;  Surgeon: Ronald Calderon MD;  Location: [facility] ENDOSCOPY;  Service: Gastroenterology;;    ESOPHAGOGASTRODUODENOSCOPY N/A 03/22/2024    Procedure: EGD;  Surgeon: Ronald Calderon MD;  Location: [facility] ENDOSCOPY;  Service: Gastroenterology;  Laterality: N/A;    Heart Stent N/A     > 10yrs. AGO    INCISION AND DRAINAGE N/A 03/18/2024    Procedure: Incision and Drainage;  Surgeon: Fahad Rivas MD;  Location: [facility] OR;  Service: Urology;  Laterality: N/A;  I&D SCROTAL ABSCESS    INSERTION OF STENT INTO PERIPHERAL VESSEL Right 10/17/2022    RIGHT SFA ATHERECTOMY, BALLOON ANGIOPLASTY, STENT; RIGHT ANTERIOR TIBIAL ARTERY ATHERECTOMY, BALLOON ANGIOPLASTY    ORCHIECTOMY Left 03/18/2024    Procedure: ORCHIECTOMY;  Surgeon: Fahad Rivas MD;  Location: [facility];  Service: Urology;  Laterality: Left;     Allergies:   Review of patient's allergies indicates:  No Known Allergies  Family History:     Family History   Problem Relation Name Age of Onset    Cancer Mother      Hypertension Mother      Heart failure Father      Stroke Father      Hypertension Father      No Known Problems Sister       Social History:   Social History[1]  Review of Systems:     The patient denies headaches, changes in vision, lightheadedness, dizziness, nausea, emesis, fevers, chills, chest pain, dyspnea, palpitations, abdominal pain, or changes in urinary or bowel habits.    Medications:     Home Medications:  Prior to Admission medications    Medication Sig Start Date End Date Taking? Authorizing Provider   aspirin (ECOTRIN) 81 MG EC tablet Take 1 tablet by mouth once daily. 3/14/25  Yes Provider, Historical   olopatadine (PATANOL) 0.1 % ophthalmic solution Apply to eye. 2/7/25  Yes Provider, Historical   sacubitriL-valsartan (ENTRESTO) 49-51 mg per tablet Take 1 tablet by mouth once daily. 2/12/25  Yes Provider,  Historical   urea (CARMOL) 40 % Crea Apply topically. 2/13/25  Yes Provider, Historical   varenicline tartrate (CHANTIX) 1 mg Tab Take 0.5 mg by mouth. 2/13/25  Yes Provider, Historical   acetaminophen 325 mg Cap Take 650 mg by mouth 2 (two) times daily as needed.    Provider, Historical   ALBUTEROL INHL Inhale 2 puffs into the lungs every 6 (six) hours as needed.    Provider, Historical   albuterol-ipratropium (DUO-NEB) 2.5 mg-0.5 mg/3 mL nebulizer solution Take 3 mLs by nebulization every 6 (six) hours as needed for Wheezing. Rescue 12/9/24   Diana Hassan MD   atorvastatin (LIPITOR) 80 MG tablet Take 1 tablet (80 mg total) by mouth once daily. 3/4/25 3/4/26  Marino Marquez DO   BREZTRI AEROSPHERE 160-9-4.8 mcg/actuation HFAA Inhale 2 puffs into the lungs 2 (two) times daily. 1/7/25   Provider, Historical   cetirizine (ZYRTEC) 10 MG tablet Take 1 tablet (10 mg total) by mouth once daily. 8/8/24 8/3/25  Abiodun Recinos,    clopidogreL (PLAVIX) 75 mg tablet Take 1 tablet (75 mg total) by mouth once daily. 3/4/25   Marino Marquez DO   folic acid (FOLVITE) 1 MG tablet Take 1 tablet (1 mg total) by mouth once daily. 4/9/24 4/9/25  Tha Edmond MD   furosemide (LASIX) 40 MG tablet Take 1 tablet (40 mg total) by mouth 2 (two) times a day. 3/4/25   Marino Marquez DO   gabapentin (NEURONTIN) 300 MG capsule Take 300 mg by mouth 3 (three) times daily.    Provider, Historical   metoprolol succinate (TOPROL-XL) 25 MG 24 hr tablet Take 0.5 tablets (12.5 mg total) by mouth once daily. 3/4/25 3/4/26  Marino Marquez DO   nicotine (NICODERM CQ) 14 mg/24 hr Place 1 patch onto the skin daily as needed. 3/4/25   Marino Marquez DO   nicotine (NICODERM CQ) 21 mg/24 hr Place 1 patch onto the skin once daily. 3/20/25   Heaven Page MD   nitrofurantoin, macrocrystal-monohydrate, (MACROBID) 100 MG capsule Take 1 capsule (100 mg total) by mouth 2 (two) times daily. 3/4/25   Marino Marquez DO   pantoprazole (PROTONIX) 40 MG tablet  Take 1 tablet (40 mg total) by mouth 2 (two) times a day. 9/23/24   Abiodun Recinos DO   potassium chloride SA (K-DUR,KLOR-CON) 20 MEQ tablet Take 1 tablet (20 mEq total) by mouth 2 (two) times daily. 1/28/25 1/28/26  Vernon Cifuentes MD   rivaroxaban (XARELTO) 20 mg Tab Take 1 tablet (20 mg total) by mouth Daily. 3/4/25   Marino Marquez DO   sertraline (ZOLOFT) 100 MG tablet Take 100 mg by mouth once daily.    Provider, Historical   vitamin D (VITAMIN D3) 1000 units Tab Take 1,000 Units by mouth once daily.    Provider, Historical       Current/Inpatient Medications:  Infusions:   0.9% NaCl   Intravenous Continuous   Stopped at 05/04/25 2254     Scheduled:   atorvastatin  80 mg Oral Daily    clopidogreL  75 mg Oral Daily    ferrous sulfate  1 tablet Oral Q48H    folic acid  1 mg Oral Daily    hydrocortisone sodium succinate  50 mg Intravenous Q8H    ipratropium  0.5 mg Nebulization Q12H    levalbuterol  1.25 mg Nebulization Q12H    miconazole NITRATE 2 %   Topical (Top) BID    pantoprazole  40 mg Oral Daily    QUEtiapine  100 mg Oral QHS    rivaroxaban  20 mg Oral Daily    vitamin D  1,000 Units Oral Daily     PRN:    Current Facility-Administered Medications:     acetaminophen, 650 mg, Oral, Q4H PRN    aluminum-magnesium hydroxide, 30 mL, Oral, BID PRN    dextrose 50%, 12.5 g, Intravenous, PRN    dextrose 50%, 25 g, Intravenous, PRN    diphenhydrAMINE, 25 mg, Intravenous, Q6H PRN    glucagon (human recombinant), 1 mg, Intramuscular, PRN    glucose, 16 g, Oral, PRN    glucose, 24 g, Oral, PRN    guaiFENesin 100 mg/5 ml, 200 mg, Oral, Q4H PRN    hydrALAZINE, 10 mg, Intravenous, Q4H PRN    hydrocortisone, , Topical (Top), TID PRN    insulin aspart U-100, 0-5 Units, Subcutaneous, QID (AC + HS) PRN    lorazepam, 1 mg, Intravenous, Q6H PRN    melatonin, 6 mg, Oral, Nightly PRN    naloxone, 0.02 mg, Intravenous, PRN    nicotine, 1 patch, Transdermal, Daily PRN    polyethylene glycol, 17 g, Oral, Daily PRN     prochlorperazine, 5 mg, Intravenous, Q6H PRN    senna-docusate, 1 tablet, Oral, BID PRN    simethicone, 1 tablet, Oral, TID PRN    sodium chloride 0.9%, 10 mL, Intravenous, PRN    traZODone, 25 mg, Oral, Nightly PRN    Objective:     24-hour Vitals:   Temp:  [96 °F (35.6 °C)-98.3 °F (36.8 °C)] 98.2 °F (36.8 °C)  Pulse:  [51-89] 62  Resp:  [16-29] 20  SpO2:  [58 %-100 %] 100 %  BP: (72-99)/(46-70) 96/66    Vitals: BP 96/66   Pulse 62   Temp 98.2 °F (36.8 °C) (Oral)   Resp 20   Ht 6' (1.829 m)   Wt 85 kg (187 lb 6.3 oz)   SpO2 100%   BMI 25.41 kg/m²     Intake/Output Summary (Last 24 hours) at 5/16/2025 0827  Last data filed at 5/15/2025 1300  Gross per 24 hour   Intake --   Output 350 ml   Net -350 ml       Physical Exam  Vitals reviewed.   Constitutional:       General: He is not in acute distress.     Appearance: Normal appearance. He is normal weight. He is not ill-appearing.   HENT:      Head: Normocephalic and atraumatic.      Right Ear: External ear normal.      Left Ear: External ear normal.      Nose: Nose normal.      Mouth/Throat:      Mouth: Mucous membranes are moist.   Eyes:      Conjunctiva/sclera: Conjunctivae normal.   Cardiovascular:      Rate and Rhythm: Normal rate. Rhythm irregular.      Pulses: Normal pulses.      Heart sounds: Murmur heard.   Pulmonary:      Effort: Pulmonary effort is normal. No respiratory distress.      Breath sounds: No stridor. No wheezing, rhonchi or rales.   Chest:      Chest wall: No tenderness.   Abdominal:      General: Bowel sounds are normal. There is no distension.      Palpations: Abdomen is soft.   Musculoskeletal:      Cervical back: Neck supple.      Right lower leg: Edema present.      Left lower leg: Edema present.      Comments: Trace LE edema.    Skin:     General: Skin is warm and dry.      Capillary Refill: Capillary refill takes less than 2 seconds.   Neurological:      Mental Status: He is alert and oriented to person, place, and time.    Psychiatric:         Mood and Affect: Mood normal.         Behavior: Behavior normal.        Labs:   I have reviewed the following labs below:    Recent Labs   Lab 05/13/25  0253 05/13/25  1234 05/14/25  0329 05/14/25  0929 05/15/25  0323 05/15/25  0902 05/15/25  1553 05/15/25  2028 05/16/25  0317   WBC 8.38  --  11.76*  --  14.74*  --   --   --  14.83*   HGB 7.8*  --  8.3*  --  8.8*  --   --   --  9.3*   HCT 24.6*  --  25.3*  --  27.7*  --   --   --  29.5*     --  164  --  192  --   --   --  198   *   < > 133*   < > 131*   < > 133* 133* 132*   K 4.3   < > 3.3*   < > 3.5   < > 3.8 3.6 3.6   CL 96*   < > 99   < > 98   < > 99 99 98   CO2 25   < > 24   < > 22*   < > 24 24 23   BUN 42.3*   < > 42.8*   < > 52.8*   < > 47.7* 47.2* 47.7*   CREATININE 1.06   < > 0.97   < > 1.19   < > 1.05 1.21 1.21   *   < > 158*   < > 151*   < > 127* 189* 119*   CALCIUM 9.5   < > 9.2   < > 9.3   < > 9.4 9.4 9.0   MG 1.80  1.90   < > 1.80   < > 1.80   < > 2.30 2.20 2.10   PHOS 3.3  3.3   < > 3.6   < > 3.9   < > 3.6 3.7 3.6   PROT 7.2  --   --   --   --   --   --   --   --    ALBUMIN 3.1*  --   --   --   --   --   --   --   --    BILITOT 4.6*  --   --   --   --   --   --   --   --    AST 49*  --   --   --   --   --   --   --   --    ALT 48  --   --   --   --   --   --   --   --    ALKPHOS 246*  --   --   --   --   --   --   --   --    TROPONINI 0.018  --   --   --   --   --   --   --   --     < > = values in this interval not displayed.     Cardiovascular Studies/Imaging:   I have reviewed the following studies below:  TTE:   Summary  Show Result Comparison     Left Ventricle: The left ventricle is severely dilated. Severely increased ventricular mass. Mildly increased wall thickness. There is severe eccentric hypertrophy. Severe global hypokinesis present. There is severely reduced systolic function. Quantitated ejection fraction is 22%. Grade III diastolic dysfunction.    Right Ventricle: The right ventricle has  moderate enlargement. Systolic function is moderately reduced.    Left Atrium: Left atrium is severely dilated measuring 92ml/m2.    Right Atrium: Right atrium is severely dilated.    Aortic Valve: The aortic valve is a trileaflet valve. There is mild aortic valve sclerosis. There is no stenosis. There is no significant regurgitation.    Mitral Valve: The mitral valve is structurally normal. There is no stenosis. There is severe central regurgitation.    Tricuspid Valve: The tricuspid valve is structurally normal. There is moderate regurgitation.    Pulmonary Artery: There is moderate pulmonary hypertension. The estimated pulmonary artery systolic pressure is 59 mmHg.    IVC/SVC: Elevated venous pressure at 15 mmHg.    Pericardium: There is no pericardial effusion.    LHC/Cors: 3/26/2024  Coronary findings:     Dominance: right   Left main:  10-20% calcified distal left main disease  Left anterior descending artery:  50% calcified proximal stenosis  Circumflex artery:  Luminal irregularities  Right coronary artery:  Luminal irregularities    Imaging:   See imaging section for details.     Assessment:     Ran Reyes Jr. is a 69 y.o. male with NICM-HFrEF (LVEF 20-25%; G3DD), HTN, non obstructive CAD, AF on OAC, PAD, HLD, and COPD who presented with acute on chronic decompensated heart failure in cardiogenic shock now resolved.     Plan/Recommendations:   NICM-HFrEF Stage D with Resolved CS and Severe Mitral Regurgitation   -Unable to tolerate GDMT. Symptomatic with attempts to start BB. Hypotensive off of therapies - though attempted ARNi.  -Bumex 2 mg oral today - assess UOP response.    -Advanced HF evaluation outpatient. Brian Kuo.  -LifeVest prior to discharge.     Atrial Fibrillation  -Unable to tolerate BB.   -Continue anticoagulation for CVA risk reduction.     Hypertension  -Hypotensive. See above explanation.      Non Obstructive Epicardial Coronary Artery Disease  Peripheral Artery  Disease  -SAPT and high intensity statin therapy.     Vernon Cifuentes MD  [facility] Chief Cardiology Fellow, PGY-6  [facility]                [1]   Social History  Tobacco Use    Smoking status: Every Day     Current packs/day: 0.50     Average packs/day: 0.8 packs/day for 50.4 years (40.9 ttl pk-yrs)     Types: Cigarettes     Start date: 1975     Passive exposure: Current    Smokeless tobacco: Never    Tobacco comments:     States smoke 2-3 cigarettes per day   Substance Use Topics    Alcohol use: Yes     Alcohol/week: 3.0 standard drinks of alcohol     Types: 3 Cans of beer per week     Comment: socially    Drug use: Not Currently     Frequency: 1.0 times per week     Types: Marijuana     Comment: no use in over 1 year

## 2025-05-17 LAB
ANION GAP SERPL CALC-SCNC: 9 MEQ/L
BASOPHILS # BLD AUTO: 0.01 X10(3)/MCL
BASOPHILS NFR BLD AUTO: 0.1 %
BUN SERPL-MCNC: 38.7 MG/DL (ref 8.4–25.7)
CALCIUM SERPL-MCNC: 9 MG/DL (ref 8.8–10)
CHLORIDE SERPL-SCNC: 98 MMOL/L (ref 98–107)
CO2 SERPL-SCNC: 22 MMOL/L (ref 23–31)
CREAT SERPL-MCNC: 0.96 MG/DL (ref 0.72–1.25)
CREAT/UREA NIT SERPL: 40
EOSINOPHIL # BLD AUTO: 0.01 X10(3)/MCL (ref 0–0.9)
EOSINOPHIL NFR BLD AUTO: 0.1 %
ERYTHROCYTE [DISTWIDTH] IN BLOOD BY AUTOMATED COUNT: 25.6 % (ref 11.5–17)
GFR SERPLBLD CREATININE-BSD FMLA CKD-EPI: >60 ML/MIN/1.73/M2
GLUCOSE SERPL-MCNC: 130 MG/DL (ref 82–115)
HCT VFR BLD AUTO: 27.8 % (ref 42–52)
HGB BLD-MCNC: 8.9 G/DL (ref 14–18)
IMM GRANULOCYTES # BLD AUTO: 0.05 X10(3)/MCL (ref 0–0.04)
IMM GRANULOCYTES NFR BLD AUTO: 0.4 %
LYMPHOCYTES # BLD AUTO: 0.4 X10(3)/MCL (ref 0.6–4.6)
LYMPHOCYTES NFR BLD AUTO: 3.5 %
MAGNESIUM SERPL-MCNC: 2 MG/DL (ref 1.6–2.6)
MCH RBC QN AUTO: 24.7 PG (ref 27–31)
MCHC RBC AUTO-ENTMCNC: 32 G/DL (ref 33–36)
MCV RBC AUTO: 77 FL (ref 80–94)
MONOCYTES # BLD AUTO: 0.76 X10(3)/MCL (ref 0.1–1.3)
MONOCYTES NFR BLD AUTO: 6.6 %
NEUTROPHILS # BLD AUTO: 10.25 X10(3)/MCL (ref 2.1–9.2)
NEUTROPHILS NFR BLD AUTO: 89.3 %
NRBC BLD AUTO-RTO: 0 %
PHOSPHATE SERPL-MCNC: 3.2 MG/DL (ref 2.3–4.7)
PLATELET # BLD AUTO: 185 X10(3)/MCL (ref 130–400)
PLATELETS.RETICULATED NFR BLD AUTO: 4 % (ref 0.9–11.2)
PMV BLD AUTO: ABNORMAL FL
POCT GLUCOSE: 133 MG/DL (ref 70–110)
POCT GLUCOSE: 146 MG/DL (ref 70–110)
POCT GLUCOSE: 153 MG/DL (ref 70–110)
POTASSIUM SERPL-SCNC: 4.3 MMOL/L (ref 3.5–5.1)
RBC # BLD AUTO: 3.61 X10(6)/MCL (ref 4.7–6.1)
SODIUM SERPL-SCNC: 129 MMOL/L (ref 136–145)
WBC # BLD AUTO: 11.48 X10(3)/MCL (ref 4.5–11.5)

## 2025-05-17 PROCEDURE — 84100 ASSAY OF PHOSPHORUS: CPT

## 2025-05-17 PROCEDURE — 80048 BASIC METABOLIC PNL TOTAL CA: CPT

## 2025-05-17 PROCEDURE — 27000221 HC OXYGEN, UP TO 24 HOURS

## 2025-05-17 PROCEDURE — 63600175 PHARM REV CODE 636 W HCPCS

## 2025-05-17 PROCEDURE — 36415 COLL VENOUS BLD VENIPUNCTURE: CPT

## 2025-05-17 PROCEDURE — 25000003 PHARM REV CODE 250

## 2025-05-17 PROCEDURE — 25000242 PHARM REV CODE 250 ALT 637 W/ HCPCS

## 2025-05-17 PROCEDURE — 85025 COMPLETE CBC W/AUTO DIFF WBC: CPT

## 2025-05-17 PROCEDURE — 94761 N-INVAS EAR/PLS OXIMETRY MLT: CPT

## 2025-05-17 PROCEDURE — 27000207 HC ISOLATION

## 2025-05-17 PROCEDURE — 83735 ASSAY OF MAGNESIUM: CPT

## 2025-05-17 PROCEDURE — 94640 AIRWAY INHALATION TREATMENT: CPT

## 2025-05-17 PROCEDURE — 94760 N-INVAS EAR/PLS OXIMETRY 1: CPT

## 2025-05-17 PROCEDURE — 21400001 HC TELEMETRY ROOM

## 2025-05-17 RX ORDER — PREDNISONE 20 MG/1
20 TABLET ORAL 2 TIMES DAILY
Status: DISCONTINUED | OUTPATIENT
Start: 2025-05-17 | End: 2025-05-20 | Stop reason: HOSPADM

## 2025-05-17 RX ADMIN — PREDNISONE 20 MG: 20 TABLET ORAL at 11:05

## 2025-05-17 RX ADMIN — SULFAMETHOXAZOLE AND TRIMETHOPRIM 1 TABLET: 800; 160 TABLET ORAL at 08:05

## 2025-05-17 RX ADMIN — HYDROCORTISONE SODIUM SUCCINATE 50 MG: 100 INJECTION, POWDER, FOR SOLUTION INTRAMUSCULAR; INTRAVENOUS at 05:05

## 2025-05-17 RX ADMIN — CLOPIDOGREL BISULFATE 75 MG: 75 TABLET, FILM COATED ORAL at 08:05

## 2025-05-17 RX ADMIN — IPRATROPIUM BROMIDE 0.5 MG: 0.5 SOLUTION RESPIRATORY (INHALATION) at 08:05

## 2025-05-17 RX ADMIN — QUETIAPINE FUMARATE 100 MG: 100 TABLET ORAL at 08:05

## 2025-05-17 RX ADMIN — ATORVASTATIN CALCIUM 80 MG: 40 TABLET, FILM COATED ORAL at 08:05

## 2025-05-17 RX ADMIN — RIVAROXABAN 20 MG: 10 TABLET, FILM COATED ORAL at 04:05

## 2025-05-17 RX ADMIN — IPRATROPIUM BROMIDE 0.5 MG: 0.5 SOLUTION RESPIRATORY (INHALATION) at 07:05

## 2025-05-17 RX ADMIN — LEVALBUTEROL HYDROCHLORIDE 1.25 MG: 1.25 SOLUTION RESPIRATORY (INHALATION) at 07:05

## 2025-05-17 RX ADMIN — PREDNISONE 20 MG: 20 TABLET ORAL at 08:05

## 2025-05-17 RX ADMIN — FOLIC ACID 1 MG: 1 TABLET ORAL at 08:05

## 2025-05-17 RX ADMIN — LEVALBUTEROL HYDROCHLORIDE 1.25 MG: 1.25 SOLUTION RESPIRATORY (INHALATION) at 08:05

## 2025-05-17 RX ADMIN — PANTOPRAZOLE SODIUM 40 MG: 40 TABLET, DELAYED RELEASE ORAL at 08:05

## 2025-05-17 RX ADMIN — Medication 1000 UNITS: at 08:05

## 2025-05-17 NOTE — PLAN OF CARE
Problem: Skin Injury Risk Increased  Goal: Skin Health and Integrity  Outcome: Progressing     Problem: Adult Inpatient Plan of Care  Goal: Plan of Care Review  Outcome: Progressing     Problem: Adult Inpatient Plan of Care  Goal: Patient-Specific Goal (Individualized)  Outcome: Progressing     Problem: Adult Inpatient Plan of Care  Goal: Absence of Hospital-Acquired Illness or Injury  Outcome: Progressing     Problem: Adult Inpatient Plan of Care  Goal: Optimal Comfort and Wellbeing  Outcome: Progressing     Problem: Adult Inpatient Plan of Care  Goal: Readiness for Transition of Care  Outcome: Progressing     Problem: COPD (Chronic Obstructive Pulmonary Disease)  Goal: Optimal Chronic Illness Coping  Outcome: Progressing     Problem: COPD (Chronic Obstructive Pulmonary Disease)  Goal: Optimal Level of Functional [place]  Outcome: Progressing

## 2025-05-17 NOTE — PROGRESS NOTES
[facility] - [address] [place] [address] Telemetry  Pulmonary Critical Care Note    Patient Name: Ran Reyes Jr.  MRN: [id number]  Admission Date: 4/21/2025  Hospital Length of Stay: 25 days  Code Status: Full Code  Attending Provider: Trisha German MD  Primary Care Provider: Abiodun Recinos DO     Subjective:     HPI:   Ran Reyes Jr. is a 69 y.o. 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 Patient reports he can only ambulate about 20-30 feet before having to stop due to claudication pain which is chronic and unchanged compared to baseline. Since running out of his diuretic medications approximately 5 days ago he has noted progressively worsening lower extremity swelling causing leg heaviness and weakness exacerbating difficulty ambulating.  He also notes acute on chronic cough productive of a brownish sputum without hemoptysis.  Denies fever, chills, sweats.  Denies chest pain.  Endorses shortness of breath at rest and with ambulation but unchanged compared to baseline.  Denies abdominal pain, nausea, vomiting, constipation, diarrhea.  Denies increased or decreased urinary frequency, dysuria, or hematuria.  Sleeps with 2 pillows at night due to baseline orthopnea. Has not had to increase number of pillows or change sleeping habits. Wears 2L NC home oxygen at baseline for hx of COPD. Denies having increased oxygen requirements prior to ED presentation.     ED course:  Vital signs stable.  Oxygenation  stable on 2 L nasal cannula.  CBC shows microcytic anemic (HGB 9.0; MCV 77).  CMP shows hyponatremia (Na 128), acidemia (CO2 14), elevated renal indices (BUN 25; creatinine 1.93), elevated alkaline phosphatase (). Troponin WNL.  BNP 26 39.5.  UDS negative.  EKG showed atrial fibrillation with PVCs.  Chest x-ray difficult to interpret due to overlying medical devices however appears to show cardiomegaly with bilateral pulmonary vascular congestion with question of bilateral pleural effusions. He was admitted for acute CHF exacerbation and cardiorenal syndrome.      Hospital Course/Significant events:  4/23/25: Admit to ICU, started on pressors and inotropes for cardiogenic / septic shock   5/2/25: Downgraded to    5/3/25: Re-upgraded to ICU 2/2 worsening lactate and agonal breathing  5/13/25: Weaned off dobutamine  05/14/2025: Downgraded from ICU.    24 Hour Interval History:  NAEON. Patient denies any acute complaints today. Vitals show patient BP at goal relative to patient EF. Remaining vitals stable. 5/16/2025 UPO 1400. 300 cc documented during day. He remains off dobutamine gtt at this time.  Stable microcytic anemia.  His CBC showed elevated white count at 14.83.  Patient's Dobbs remained in place. Leukocytosis improved from 14.83 to 11.48. CMP showed continued hyponatremia but more stable Mg, K, Phos     Past Medical History:   Diagnosis Date    A-fib     Anticoagulant long-term use     Anxiety     Aortic aneurysm     Cataract     CHF (congestive heart failure)     Chronic atrial fibrillation     COPD (chronic obstructive pulmonary disease)     Coronary artery disease     Depression     HLD (hyperlipidemia)     Hypertension     Mitral regurgitation     PAD (peripheral artery disease)     Primary open angle glaucoma (POAG)        Past Surgical History:   Procedure Laterality Date    ANGIOGRAM, CORONARY, WITH LEFT HEART CATHETERIZATION N/A 03/26/2024    Procedure: Angiogram, Coronary, with Left Heart  Cath;  Surgeon: Adriano Montiel MD;  Location: [facility] CATH LAB;  Service: Cardiology;  Laterality: N/A;    ATHERECTOMY OF PERIPHERAL VESSEL Left 09/12/2022    LEFT SFA ATHERECTOMY, BALLOON ANGIOPLASTY    CATARACT EXTRACTION W/  INTRAOCULAR LENS IMPLANT Left 03/09/2023    Procedure: EXTRACTION, CATARACT, WITH IOL INSERTION;  Surgeon: Georgi Borja MD;  Location: [facility];  Service: Ophthalmology;  Laterality: Left;  19.5  mac    EGD, WITH CLOSED BIOPSY  03/22/2024    Procedure: EGD, WITH CLOSED BIOPSY;  Surgeon: Ronald Calderon MD;  Location: [facility] ENDOSCOPY;  Service: Gastroenterology;;    ESOPHAGOGASTRODUODENOSCOPY N/A 03/22/2024    Procedure: EGD;  Surgeon: Ronald Calderon MD;  Location: [facility] ENDOSCOPY;  Service: Gastroenterology;  Laterality: N/A;    Heart Stent N/A     > 10yrs. AGO    INCISION AND DRAINAGE N/A 03/18/2024    Procedure: Incision and Drainage;  Surgeon: Fahad Rivas MD;  Location: [facility];  Service: Urology;  Laterality: N/A;  I&D SCROTAL ABSCESS    INSERTION OF STENT INTO PERIPHERAL VESSEL Right 10/17/2022    RIGHT SFA ATHERECTOMY, BALLOON ANGIOPLASTY, STENT; RIGHT ANTERIOR TIBIAL ARTERY ATHERECTOMY, BALLOON ANGIOPLASTY    ORCHIECTOMY Left 03/18/2024    Procedure: ORCHIECTOMY;  Surgeon: Fahad Rivas MD;  Location: [facility];  Service: Urology;  Laterality: Left;       Social History[1]    Current Outpatient Medications   Medication Instructions    acetaminophen 650 mg, 2 times daily PRN    ALBUTEROL INHL 2 puffs, Every 6 hours PRN    albuterol-ipratropium (DUO-NEB) 2.5 mg-0.5 mg/3 mL nebulizer solution 3 mLs, Nebulization, Every 6 hours PRN, Rescue    aspirin (ECOTRIN) 81 MG EC tablet 1 tablet, Daily    atorvastatin (LIPITOR) 80 mg, Oral, Daily    BREZTRI AEROSPHERE 160-9-4.8 mcg/actuation HFAA 2 puffs, 2 times daily    cetirizine (ZYRTEC) 10 mg, Oral, Daily    clopidogreL (PLAVIX) 75 mg, Oral, Daily    folic acid (FOLVITE) 1 mg, Oral, Daily    furosemide  (LASIX) 40 mg, Oral, 2 times daily    gabapentin (NEURONTIN) 300 mg, Oral, 3 times daily    metoprolol succinate (TOPROL-XL) 12.5 mg, Oral, Daily    nicotine (NICODERM CQ) 14 mg/24 hr 1 patch, Transdermal, Daily PRN    nicotine (NICODERM CQ) 21 mg/24 hr 1 patch, Transdermal, Daily    nitrofurantoin, macrocrystal-monohydrate, (MACROBID) 100 MG capsule 100 mg, Oral, 2 times daily    olopatadine (PATANOL) 0.1 % ophthalmic solution Apply to eye.    pantoprazole (PROTONIX) 40 mg, Oral, 2 times daily    potassium chloride SA (K-DUR,KLOR-CON) 20 MEQ tablet 20 mEq, Oral, 2 times daily    rivaroxaban (XARELTO) 20 mg, Oral, Daily    sacubitriL-valsartan (ENTRESTO) 49-51 mg per tablet 1 tablet, Daily    sertraline (ZOLOFT) 100 mg, Oral, Daily    urea (CARMOL) 40 % Crea Apply topically.    varenicline tartrate (CHANTIX) 0.5 mg    vitamin D (VITAMIN D3) 1,000 Units, Daily     Current Inpatient Medications   atorvastatin  80 mg Oral Daily    clopidogreL  75 mg Oral Daily    ferrous sulfate  1 tablet Oral Q48H    folic acid  1 mg Oral Daily    ipratropium  0.5 mg Nebulization Q12H    levalbuterol  1.25 mg Nebulization Q12H    miconazole NITRATE 2 %   Topical (Top) BID    pantoprazole  40 mg Oral Daily    predniSONE  20 mg Oral BID    QUEtiapine  100 mg Oral QHS    rivaroxaban  20 mg Oral Daily    sulfamethoxazole-trimethoprim 800-160mg  1 tablet Oral BID    vitamin D  1,000 Units Oral Daily     Current Intravenous Infusions   0.9% NaCl   Intravenous Continuous   Stopped at 05/04/25 5588     ROS:   Review of Systems   Constitutional:  Negative for chills, diaphoresis, fever and malaise/fatigue.   HENT:  Negative for sore throat and tinnitus.    Eyes:  Negative for pain, discharge and redness.   Respiratory:  Negative for cough, hemoptysis, sputum production, shortness of breath and wheezing.    Cardiovascular:  Positive for leg swelling. Negative for chest pain, palpitations, orthopnea and claudication.   Gastrointestinal:   Negative for abdominal pain, blood in stool, constipation, diarrhea, melena, nausea and vomiting.   Genitourinary:  Negative for dysuria, flank pain, frequency, hematuria and urgency.   Musculoskeletal:  Negative for joint pain and myalgias.   Neurological:  Negative for weakness and headaches.           Objective:     Intake/Output Summary (Last 24 hours) at 5/17/2025 1818  Last data filed at 5/17/2025 1230  Gross per 24 hour   Intake 480 ml   Output 1400 ml   Net -920 ml     Vital Signs (Most Recent):  Temp: 97.8 °F (36.6 °C) (05/17/25 1534)  Pulse: 101 (05/17/25 1534)  Resp: 16 (05/17/25 1534)  BP: 100/66 (05/17/25 1534)  SpO2: 99 % (05/17/25 1534)  Body mass index is 27.48 kg/m².  Weight: 91.9 kg (202 lb 9.6 oz) Vital Signs (24h Range):  Temp:  [97.4 °F (36.3 °C)-98.4 °F (36.9 °C)] 97.8 °F (36.6 °C)  Pulse:  [] 101  Resp:  [16-22] 16  SpO2:  [94 %-100 %] 99 %  BP: ()/(56-78) 100/66     Physical Exam:  Physical Exam  Constitutional:       General: He is not in acute distress.     Appearance: Normal appearance. He is not ill-appearing.   HENT:      Head: Normocephalic and atraumatic.   Eyes:      General: No scleral icterus.        Right eye: No discharge.         Left eye: No discharge.      Extraocular Movements: Extraocular movements intact.      Conjunctiva/sclera: Conjunctivae normal.      Pupils: Pupils are equal, round, and reactive to light.   Cardiovascular:      Rate and Rhythm: Normal rate. Rhythm irregular.      Pulses: Normal pulses.      Heart sounds: Murmur heard.      No friction rub. No gallop.   Pulmonary:      Effort: Pulmonary effort is normal. No respiratory distress.      Breath sounds: Normal breath sounds. No stridor. No wheezing, rhonchi or rales.   Abdominal:      General: Abdomen is flat. Bowel sounds are normal. There is no distension.      Palpations: Abdomen is soft.      Tenderness: There is no abdominal tenderness. There is no guarding.   Musculoskeletal:          "General: Swelling present. Normal range of motion.      Right lower leg: Edema present.      Left lower leg: Edema present.      Comments: 1+ pitting edema to BLE   Skin:     General: Skin is warm and dry.   Neurological:      General: No focal deficit present.      Mental Status: He is alert and oriented to person, place, and time.           Lines/Drains/Airways       Peripheral Intravenous Line  Duration                  Peripheral IV - Single Lumen 05/15/25 0505 20 G 2 1/4 in Yes Anterior;Left Forearm 2 days                  Significant Labs:  Lab Results   Component Value Date    WBC 11.48 05/17/2025    HGB 8.9 (L) 05/17/2025    HCT 27.8 (L) 05/17/2025    MCV 77.0 (L) 05/17/2025     05/17/2025       BMP  Lab Results   Component Value Date     (L) 05/17/2025    K 4.3 05/17/2025    CO2 22 (L) 05/17/2025    BUN 38.7 (H) 05/17/2025    CREATININE 0.96 05/17/2025    CALCIUM 9.0 05/17/2025    AGAP 9.0 05/17/2025    EGFRNONAA 88 (L) 12/06/2021     ABG  No results for input(s): "PH", "PO2", "PCO2", "HCO3", "BE" in the last 168 hours.    Mechanical Ventilation Support:  Oxygen Concentration (%): 28 (05/17/25 0733)    Significant Imaging:  I have reviewed the pertinent imaging within the past 24 hours.    Assessment/Plan:     Cardiogenic Shock   NICM-HFrEF with pulmonary hypertension   NOCAD, NICMO  Hx of VT arrest (on life vest)  Chronic, persistent A-fib   Cardiorenal syndrome (improved)  Prolonged QT  -patient was downgraded from the ICU 05/14/2025.    -TTE showed 22% with PASP 59 mm Hg and grade 3 diastolic dysfunction  -nonobstructive coronary artery disease was noted on 03/2024 left heart catheterization.  -persistent AFib continued on 05/14/2025 EKG.    -patient had brief period time where he had defibrillator leads attached due to bedbug infestation of LifeVest wearable defibrillator.    -patient has been followed by Cardiology.  Appreciate all recommendations.    -patient has a history of not being " able to tolerate GDM T in attempts to start beta-blocker have not been successful due to patient not being able to tolerate.  Patient is hypotensive off of all blood pressure lowering therapies.  -cardiology recommended Bumex 2 mg oral and assess UOP response.  -cardiology notes need for advanced HF evaluation outpatient  -continue anticoagulation for CVA risk reduction in setting of atrial fibrillation.  Also recommend SAPT and high-intensity statin therapy.  -continue strict in's out's.    Adrenal Insufficiency   -low cortisol was noted on cord stem test and normal past ACTH level.    -consider MRI brain inpatient versus outpatient.    -on hydrocortisone 50 mg Q 8 hours.  Convert to oral prednisone prior to discharge.    Hypokalemia   -patient continues to diurese and experience hypokalemia.    -continue electrolyte goals: K> 4 Mag > 3 Phos > 2    COPD  -patient's respiratory status is stable at this time.    -continue ipratropium nebulizer solution 0.5 mg Q 8 hours    Groin Itch   -patient is continued on miconazole topical powder .    -patient has diphenhydramine 25 mg IV on board for itching.    Anxiety/insomnia   -patient is continued on Quetiapine 100 mg.    -patient was evaluated for level 2 screening for placement.    -diagnosis per psychiatry was unspecified depressive disorder.  Not requiring PVC.  Recommended continuing trazodone 25 mg p.o. nightly p.r.n. for insomnia.       Marino Marquez DO  U, Internal Medicine, PGY-I         [1]   Social History  Socioeconomic History    Marital status: Single   Tobacco Use    Smoking status: Every Day     Current packs/day: 0.50     Average packs/day: 0.8 packs/day for 50.4 years (40.9 ttl pk-yrs)     Types: Cigarettes     Start date: 1975     Passive exposure: Current    Smokeless tobacco: Never    Tobacco comments:     States smoke 2-3 cigarettes per day   Substance and Sexual Activity    Alcohol use: Yes     Alcohol/week: 3.0 standard drinks of alcohol      Types: 3 Cans of beer per week     Comment: socially    Drug use: Not Currently     Frequency: 1.0 times per week     Types: Marijuana     Comment: no use in over 1 year    Sexual activity: Not Currently     Partners: Female     Social Drivers of Health     Financial Resource Strain: Low Risk  (4/23/2025)    Overall Financial Resource Strain (CARDIA)     Difficulty of Paying Living Expenses: Not hard at all   Food Insecurity: No Food Insecurity (4/23/2025)    Hunger Vital Sign     Worried About Running Out of Food in the Last Year: Never true     Ran Out of Food in the Last Year: Never true   Transportation Needs: No Transportation Needs (4/23/2025)    PRAPARE - Transportation     Lack of Transportation (Medical): No     Lack of Transportation (Non-Medical): No   Physical Activity: Inactive (12/17/2024)    Exercise Vital Sign     Days of Exercise per Week: 0 days     Minutes of Exercise per Session: 0 min   Stress: No Stress Concern Present (4/23/2025)    [employer] - Occupational Stress Questionnaire     Feeling of Stress : Not at all   Housing Stability: Low Risk  (4/23/2025)    Housing Stability Vital Sign     Unable to Pay for Housing in the Last Year: No     Homeless in the Last Year: No

## 2025-05-18 LAB
ANION GAP SERPL CALC-SCNC: 10 MEQ/L
BASOPHILS # BLD AUTO: 0.01 X10(3)/MCL
BASOPHILS NFR BLD AUTO: 0.1 %
BUN SERPL-MCNC: 33.2 MG/DL (ref 8.4–25.7)
CALCIUM SERPL-MCNC: 8.8 MG/DL (ref 8.8–10)
CHLORIDE SERPL-SCNC: 97 MMOL/L (ref 98–107)
CO2 SERPL-SCNC: 24 MMOL/L (ref 23–31)
CREAT SERPL-MCNC: 0.99 MG/DL (ref 0.72–1.25)
CREAT/UREA NIT SERPL: 34
EOSINOPHIL # BLD AUTO: 0 X10(3)/MCL (ref 0–0.9)
EOSINOPHIL NFR BLD AUTO: 0 %
ERYTHROCYTE [DISTWIDTH] IN BLOOD BY AUTOMATED COUNT: 25.2 % (ref 11.5–17)
GFR SERPLBLD CREATININE-BSD FMLA CKD-EPI: >60 ML/MIN/1.73/M2
GLUCOSE SERPL-MCNC: 141 MG/DL (ref 82–115)
HCT VFR BLD AUTO: 27.9 % (ref 42–52)
HGB BLD-MCNC: 8.8 G/DL (ref 14–18)
IMM GRANULOCYTES # BLD AUTO: 0.07 X10(3)/MCL (ref 0–0.04)
IMM GRANULOCYTES NFR BLD AUTO: 0.6 %
LYMPHOCYTES # BLD AUTO: 0.41 X10(3)/MCL (ref 0.6–4.6)
LYMPHOCYTES NFR BLD AUTO: 3.6 %
MAGNESIUM SERPL-MCNC: 1.9 MG/DL (ref 1.6–2.6)
MCH RBC QN AUTO: 24.1 PG (ref 27–31)
MCHC RBC AUTO-ENTMCNC: 31.5 G/DL (ref 33–36)
MCV RBC AUTO: 76.4 FL (ref 80–94)
MONOCYTES # BLD AUTO: 0.66 X10(3)/MCL (ref 0.1–1.3)
MONOCYTES NFR BLD AUTO: 5.8 %
NEUTROPHILS # BLD AUTO: 10.22 X10(3)/MCL (ref 2.1–9.2)
NEUTROPHILS NFR BLD AUTO: 89.9 %
NRBC BLD AUTO-RTO: 0 %
PHOSPHATE SERPL-MCNC: 2.7 MG/DL (ref 2.3–4.7)
PLATELET # BLD AUTO: 187 X10(3)/MCL (ref 130–400)
PLATELETS.RETICULATED NFR BLD AUTO: 3.5 % (ref 0.9–11.2)
PMV BLD AUTO: ABNORMAL FL
POCT GLUCOSE: 118 MG/DL (ref 70–110)
POCT GLUCOSE: 128 MG/DL (ref 70–110)
POCT GLUCOSE: 169 MG/DL (ref 70–110)
POCT GLUCOSE: 193 MG/DL (ref 70–110)
POTASSIUM SERPL-SCNC: 4.7 MMOL/L (ref 3.5–5.1)
RBC # BLD AUTO: 3.65 X10(6)/MCL (ref 4.7–6.1)
SODIUM SERPL-SCNC: 131 MMOL/L (ref 136–145)
WBC # BLD AUTO: 11.37 X10(3)/MCL (ref 4.5–11.5)

## 2025-05-18 PROCEDURE — 84100 ASSAY OF PHOSPHORUS: CPT

## 2025-05-18 PROCEDURE — 36415 COLL VENOUS BLD VENIPUNCTURE: CPT

## 2025-05-18 PROCEDURE — 83735 ASSAY OF MAGNESIUM: CPT

## 2025-05-18 PROCEDURE — 25000003 PHARM REV CODE 250

## 2025-05-18 PROCEDURE — 94760 N-INVAS EAR/PLS OXIMETRY 1: CPT

## 2025-05-18 PROCEDURE — 21400001 HC TELEMETRY ROOM

## 2025-05-18 PROCEDURE — 80048 BASIC METABOLIC PNL TOTAL CA: CPT

## 2025-05-18 PROCEDURE — 63600175 PHARM REV CODE 636 W HCPCS

## 2025-05-18 PROCEDURE — 97116 GAIT TRAINING THERAPY: CPT

## 2025-05-18 PROCEDURE — 94640 AIRWAY INHALATION TREATMENT: CPT

## 2025-05-18 PROCEDURE — 27000221 HC OXYGEN, UP TO 24 HOURS

## 2025-05-18 PROCEDURE — 25000242 PHARM REV CODE 250 ALT 637 W/ HCPCS

## 2025-05-18 PROCEDURE — 27000207 HC ISOLATION

## 2025-05-18 PROCEDURE — 85025 COMPLETE CBC W/AUTO DIFF WBC: CPT

## 2025-05-18 RX ORDER — BUMETANIDE 1 MG/1
1 TABLET ORAL DAILY
Status: DISCONTINUED | OUTPATIENT
Start: 2025-05-18 | End: 2025-05-20 | Stop reason: HOSPADM

## 2025-05-18 RX ORDER — DIPHENHYDRAMINE HCL 25 MG
25 CAPSULE ORAL EVERY 6 HOURS PRN
Status: DISCONTINUED | OUTPATIENT
Start: 2025-05-18 | End: 2025-05-20 | Stop reason: HOSPADM

## 2025-05-18 RX ADMIN — SULFAMETHOXAZOLE AND TRIMETHOPRIM 1 TABLET: 800; 160 TABLET ORAL at 08:05

## 2025-05-18 RX ADMIN — QUETIAPINE FUMARATE 100 MG: 100 TABLET ORAL at 08:05

## 2025-05-18 RX ADMIN — PREDNISONE 20 MG: 20 TABLET ORAL at 08:05

## 2025-05-18 RX ADMIN — CLOPIDOGREL BISULFATE 75 MG: 75 TABLET, FILM COATED ORAL at 08:05

## 2025-05-18 RX ADMIN — IPRATROPIUM BROMIDE 0.5 MG: 0.5 SOLUTION RESPIRATORY (INHALATION) at 07:05

## 2025-05-18 RX ADMIN — Medication 1000 UNITS: at 08:05

## 2025-05-18 RX ADMIN — FERROUS SULFATE TAB 325 MG (65 MG ELEMENTAL FE) 1 EACH: 325 (65 FE) TAB at 12:05

## 2025-05-18 RX ADMIN — IPRATROPIUM BROMIDE 0.5 MG: 0.5 SOLUTION RESPIRATORY (INHALATION) at 08:05

## 2025-05-18 RX ADMIN — LEVALBUTEROL HYDROCHLORIDE 1.25 MG: 1.25 SOLUTION RESPIRATORY (INHALATION) at 07:05

## 2025-05-18 RX ADMIN — RIVAROXABAN 20 MG: 10 TABLET, FILM COATED ORAL at 04:05

## 2025-05-18 RX ADMIN — PANTOPRAZOLE SODIUM 40 MG: 40 TABLET, DELAYED RELEASE ORAL at 08:05

## 2025-05-18 RX ADMIN — LEVALBUTEROL HYDROCHLORIDE 1.25 MG: 1.25 SOLUTION RESPIRATORY (INHALATION) at 08:05

## 2025-05-18 RX ADMIN — ATORVASTATIN CALCIUM 80 MG: 40 TABLET, FILM COATED ORAL at 08:05

## 2025-05-18 RX ADMIN — FOLIC ACID 1 MG: 1 TABLET ORAL at 08:05

## 2025-05-18 RX ADMIN — BUMETANIDE 1 MG: 1 TABLET ORAL at 08:05

## 2025-05-18 NOTE — PLAN OF CARE
Problem: Skin Injury Risk Increased  Goal: Skin Health and Integrity  Outcome: Progressing     Problem: Adult Inpatient Plan of Care  Goal: Plan of Care Review  Outcome: Progressing     Problem: Adult Inpatient Plan of Care  Goal: Patient-Specific Goal (Individualized)  Outcome: Progressing     Problem: Adult Inpatient Plan of Care  Goal: Absence of Hospital-Acquired Illness or Injury  Outcome: Progressing     Problem: Adult Inpatient Plan of Care  Goal: Optimal Comfort and Wellbeing  Outcome: Progressing     Problem: Adult Inpatient Plan of Care  Goal: Readiness for Transition of Care  Outcome: Progressing     Problem: COPD (Chronic Obstructive Pulmonary Disease)  Goal: Optimal Chronic Illness Coping  Outcome: Progressing     Problem: COPD (Chronic Obstructive Pulmonary Disease)  Goal: Optimal Level of Functional [place]  Outcome: Progressing

## 2025-05-18 NOTE — PROGRESS NOTES
[facility] - [address] [place] [address] Telemetry  Pulmonary Critical Care Note    Patient Name: Ran Reyes Jr.  MRN: [id number]  Admission Date: 4/21/2025  Hospital Length of Stay: 26 days  Code Status: Full Code  Attending Provider: Trisha German MD  Primary Care Provider: Abiodun Recinos DO     Subjective:     HPI:   Ran Reyes Jr. is a 69 y.o. 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 Patient reports he can only ambulate about 20-30 feet before having to stop due to claudication pain which is chronic and unchanged compared to baseline. Since running out of his diuretic medications approximately 5 days ago he has noted progressively worsening lower extremity swelling causing leg heaviness and weakness exacerbating difficulty ambulating.  He also notes acute on chronic cough productive of a brownish sputum without hemoptysis.  Denies fever, chills, sweats.  Denies chest pain.  Endorses shortness of breath at rest and with ambulation but unchanged compared to baseline.  Denies abdominal pain, nausea, vomiting, constipation, diarrhea.  Denies increased or decreased urinary frequency, dysuria, or hematuria.  Sleeps with 2 pillows at night due to baseline orthopnea. Has not had to increase number of pillows or change sleeping habits. Wears 2L NC home oxygen at baseline for hx of COPD. Denies having increased oxygen requirements prior to ED presentation.     ED course:  Vital signs stable.  Oxygenation  stable on 2 L nasal cannula.  CBC shows microcytic anemic (HGB 9.0; MCV 77).  CMP shows hyponatremia (Na 128), acidemia (CO2 14), elevated renal indices (BUN 25; creatinine 1.93), elevated alkaline phosphatase (). Troponin WNL.  BNP 26 39.5.  UDS negative.  EKG showed atrial fibrillation with PVCs.  Chest x-ray difficult to interpret due to overlying medical devices however appears to show cardiomegaly with bilateral pulmonary vascular congestion with question of bilateral pleural effusions. He was admitted for acute CHF exacerbation and cardiorenal syndrome.      Hospital Course/Significant events:  4/23/25: Admit to ICU, started on pressors and inotropes for cardiogenic / septic shock   5/2/25: Downgraded to    5/3/25: Re-upgraded to ICU 2/2 worsening lactate and agonal breathing  5/13/25: Weaned off dobutamine  05/14/2025: Downgraded from ICU.    24 Hour Interval History:  NAEON. VSS. Patient continues to endorse orthopnea and mild leg swelling which patient states are at his baseline. Vitals show patient BP at goal relative to patient EF. Remaining vitals stable. 900 cc UOP. CBC showed stable microcytic anemia. CMP showed continued mild hyponatremia and hypochloremia.    Past Medical History:   Diagnosis Date    A-fib     Anticoagulant long-term use     Anxiety     Aortic aneurysm     Cataract     CHF (congestive heart failure)     Chronic atrial fibrillation     COPD (chronic obstructive pulmonary disease)     Coronary artery disease     Depression     HLD (hyperlipidemia)     Hypertension     Mitral regurgitation     PAD (peripheral artery disease)     Primary open angle glaucoma (POAG)        Past Surgical History:   Procedure Laterality Date    ANGIOGRAM, CORONARY, WITH LEFT HEART CATHETERIZATION N/A 03/26/2024    Procedure: Angiogram, Coronary, with Left Heart Cath;  Surgeon: Adriano Montiel MD;  Location: [facility] CATH LAB;  Service: Cardiology;  Laterality: N/A;    ATHERECTOMY OF PERIPHERAL VESSEL  Left 09/12/2022    LEFT SFA ATHERECTOMY, BALLOON ANGIOPLASTY    CATARACT EXTRACTION W/  INTRAOCULAR LENS IMPLANT Left 03/09/2023    Procedure: EXTRACTION, CATARACT, WITH IOL INSERTION;  Surgeon: Georgi Borja MD;  Location: [facility];  Service: Ophthalmology;  Laterality: Left;  19.5  mac    EGD, WITH CLOSED BIOPSY  03/22/2024    Procedure: EGD, WITH CLOSED BIOPSY;  Surgeon: Ronald Calderon MD;  Location: [facility] ENDOSCOPY;  Service: Gastroenterology;;    ESOPHAGOGASTRODUODENOSCOPY N/A 03/22/2024    Procedure: EGD;  Surgeon: Ronald Calderon MD;  Location: [facility] ENDOSCOPY;  Service: Gastroenterology;  Laterality: N/A;    Heart Stent N/A     > 10yrs. AGO    INCISION AND DRAINAGE N/A 03/18/2024    Procedure: Incision and Drainage;  Surgeon: Fahad Rivas MD;  Location: [facility];  Service: Urology;  Laterality: N/A;  I&D SCROTAL ABSCESS    INSERTION OF STENT INTO PERIPHERAL VESSEL Right 10/17/2022    RIGHT SFA ATHERECTOMY, BALLOON ANGIOPLASTY, STENT; RIGHT ANTERIOR TIBIAL ARTERY ATHERECTOMY, BALLOON ANGIOPLASTY    ORCHIECTOMY Left 03/18/2024    Procedure: ORCHIECTOMY;  Surgeon: Fahad Rivas MD;  Location: [facility];  Service: Urology;  Laterality: Left;       Social History[1]    Current Outpatient Medications   Medication Instructions    acetaminophen 650 mg, 2 times daily PRN    ALBUTEROL INHL 2 puffs, Every 6 hours PRN    albuterol-ipratropium (DUO-NEB) 2.5 mg-0.5 mg/3 mL nebulizer solution 3 mLs, Nebulization, Every 6 hours PRN, Rescue    aspirin (ECOTRIN) 81 MG EC tablet 1 tablet, Daily    atorvastatin (LIPITOR) 80 mg, Oral, Daily    BREZTRI AEROSPHERE 160-9-4.8 mcg/actuation HFAA 2 puffs, 2 times daily    cetirizine (ZYRTEC) 10 mg, Oral, Daily    clopidogreL (PLAVIX) 75 mg, Oral, Daily    folic acid (FOLVITE) 1 mg, Oral, Daily    furosemide (LASIX) 40 mg, Oral, 2 times daily    gabapentin (NEURONTIN) 300 mg, Oral, 3 times daily    metoprolol succinate (TOPROL-XL) 12.5 mg, Oral,  Daily    nicotine (NICODERM CQ) 14 mg/24 hr 1 patch, Transdermal, Daily PRN    nicotine (NICODERM CQ) 21 mg/24 hr 1 patch, Transdermal, Daily    nitrofurantoin, macrocrystal-monohydrate, (MACROBID) 100 MG capsule 100 mg, Oral, 2 times daily    olopatadine (PATANOL) 0.1 % ophthalmic solution Apply to eye.    pantoprazole (PROTONIX) 40 mg, Oral, 2 times daily    potassium chloride SA (K-DUR,KLOR-CON) 20 MEQ tablet 20 mEq, Oral, 2 times daily    rivaroxaban (XARELTO) 20 mg, Oral, Daily    sacubitriL-valsartan (ENTRESTO) 49-51 mg per tablet 1 tablet, Daily    sertraline (ZOLOFT) 100 mg, Oral, Daily    urea (CARMOL) 40 % Crea Apply topically.    varenicline tartrate (CHANTIX) 0.5 mg    vitamin D (VITAMIN D3) 1,000 Units, Daily     Current Inpatient Medications   atorvastatin  80 mg Oral Daily    bumetanide  1 mg Oral Daily    clopidogreL  75 mg Oral Daily    ferrous sulfate  1 tablet Oral Q48H    folic acid  1 mg Oral Daily    ipratropium  0.5 mg Nebulization Q12H    levalbuterol  1.25 mg Nebulization Q12H    miconazole NITRATE 2 %   Topical (Top) BID    pantoprazole  40 mg Oral Daily    predniSONE  20 mg Oral BID    QUEtiapine  100 mg Oral QHS    rivaroxaban  20 mg Oral Daily    sulfamethoxazole-trimethoprim 800-160mg  1 tablet Oral BID    vitamin D  1,000 Units Oral Daily     Current Intravenous Infusions   0.9% NaCl   Intravenous Continuous   Stopped at 05/04/25 1519     ROS:   Review of Systems   Constitutional:  Negative for chills, diaphoresis, fever and malaise/fatigue.   HENT:  Negative for sore throat and tinnitus.    Eyes:  Negative for pain, discharge and redness.   Respiratory:  Positive for shortness of breath (with ambulation). Negative for cough, hemoptysis, sputum production and wheezing.    Cardiovascular:  Positive for orthopnea and leg swelling. Negative for chest pain, palpitations and claudication.   Gastrointestinal:  Negative for abdominal pain, blood in stool, constipation, diarrhea, melena,  nausea and vomiting.   Genitourinary:  Negative for dysuria, flank pain, frequency, hematuria and urgency.   Musculoskeletal:  Negative for joint pain and myalgias.   Neurological:  Negative for weakness and headaches.           Objective:     Intake/Output Summary (Last 24 hours) at 5/18/2025 0736  Last data filed at 5/18/2025 0539  Gross per 24 hour   Intake 1182 ml   Output 900 ml   Net 282 ml     Vital Signs (Most Recent):  Temp: 97.7 °F (36.5 °C) (05/18/25 0713)  Pulse: 76 (05/18/25 0713)  Resp: 18 (05/18/25 0713)  BP: 100/78 (05/18/25 0713)  SpO2: 100 % (05/18/25 0713)  Body mass index is 27.48 kg/m².  Weight: 91.9 kg (202 lb 9.6 oz) Vital Signs (24h Range):  Temp:  [97.7 °F (36.5 °C)-98.6 °F (37 °C)] 97.7 °F (36.5 °C)  Pulse:  [] 76  Resp:  [16-18] 18  SpO2:  [96 %-100 %] 100 %  BP: ()/(51-78) 100/78     Physical Exam:  Physical Exam  Constitutional:       General: He is not in acute distress.     Appearance: Normal appearance. He is not ill-appearing.   HENT:      Head: Normocephalic and atraumatic.   Eyes:      General: No scleral icterus.        Right eye: No discharge.         Left eye: No discharge.      Extraocular Movements: Extraocular movements intact.      Conjunctiva/sclera: Conjunctivae normal.      Pupils: Pupils are equal, round, and reactive to light.   Cardiovascular:      Rate and Rhythm: Normal rate. Rhythm irregular.      Pulses: Normal pulses.      Heart sounds: Murmur heard.      No friction rub. No gallop.   Pulmonary:      Effort: Pulmonary effort is normal. No respiratory distress.      Breath sounds: Normal breath sounds. No stridor. No wheezing, rhonchi or rales.   Abdominal:      General: Abdomen is flat. Bowel sounds are normal. There is no distension.      Palpations: Abdomen is soft.      Tenderness: There is no abdominal tenderness. There is no guarding.   Musculoskeletal:         General: Swelling present. Normal range of motion.      Right lower leg: Edema present.  "     Left lower leg: Edema present.      Comments: 1+ pitting edema to BLE   Skin:     General: Skin is warm and dry.   Neurological:      General: No focal deficit present.      Mental Status: He is alert and oriented to person, place, and time.           Lines/Drains/Airways       Peripheral Intravenous Line  Duration                  Peripheral IV - Single Lumen 05/15/25 0505 20 G 2 1/4 in Yes Anterior;Left Forearm 3 days                  Significant Labs:  Lab Results   Component Value Date    WBC 11.37 05/18/2025    HGB 8.8 (L) 05/18/2025    HCT 27.9 (L) 05/18/2025    MCV 76.4 (L) 05/18/2025     05/18/2025       BMP  Lab Results   Component Value Date     (L) 05/18/2025    K 4.7 05/18/2025    CO2 24 05/18/2025    BUN 33.2 (H) 05/18/2025    CREATININE 0.99 05/18/2025    CALCIUM 8.8 05/18/2025    AGAP 10.0 05/18/2025    EGFRNONAA 88 (L) 12/06/2021     ABG  No results for input(s): "PH", "PO2", "PCO2", "HCO3", "BE" in the last 168 hours.    Mechanical Ventilation Support:  Oxygen Concentration (%): 28 (05/17/25 0733)    Significant Imaging:  I have reviewed the pertinent imaging within the past 24 hours.    Assessment/Plan:     Cardiogenic Shock   NICM-HFrEF with pulmonary hypertension   NOCAD, NICMO  Hx of VT arrest (on life vest)  Chronic persistent A-fib   Cardiorenal syndrome (improved)  Prolonged QT  Electrolyte abnormalities  -cardiology following; appreciate assistance   -EKG (05.14.25) showed persistent afib  -TTE (05/09/25) showed 22% with PASP 59 mm Hg and grade 3 diastolic dysfunction  -LHC (03/2024) showed nonobstructive coronary artery disease  -patient has a history of not being able to tolerate GDM T in attempts to start beta-blocker have not been successful due to patient not being able to tolerate  -will diurese with bumex 1 mg every other day due to electrolyte abnormalities with daily diuretic  -cardiology to refer patient to advanced HF specialist in [place] for evaluation for " LVAD versus heart transplant  -continue anticoagulation for CVA risk reduction in setting of atrial fibrillation.   -continue SAPT and high-intensity statin therapy  -strict intake and output  -daily standing weight  -maintain K>4, phos >3, mag>2    Adrenal Insufficiency   -low cortisol was noted on cord stem test and normal past ACTH level  -consider MRI brain inpatient versus outpatient.    -previously on hydrocortisone 50 mg Q 8 hours  -performed hydrocotrisone to prednisone conversation calculation which recommended 37.5 mg qd of prednisone  -now performing oral steroid taper  -prednisone 20 mg bid x 1 week followed by 20 mg daily x 1 week followed by 10 mg bid x 1 week followed by 10 mg daily x 1 week  -will refer to [facility] Endocrinology clinic as outpatient for repeat evaluation of adrenal insufficiency    COPD  -patient's respiratory status is stable at this time.    -continue ipratropium nebulizer solution 0.5 mg Q 8 hours    Groin Itch   -miconazole topical powder   -PO diphenhydramine 25 mg prn itching    Anxiety/insomnia   -patient was evaluated for level 2 screening for placement.    -diagnosis per psychiatry was unspecified depressive disorder.  Not requiring PEC.    Recommended continuing Quetiapine 100 mg and trazodone 25 mg p.o. nightly p.r.n. for insomnia.    Dispo: Continue inpatient status for management of advanced heart failure. Anticipate discharge tomorrow if patient electrolytes and BP remain stable on diuretic therapy.    Sagar Napoles MD  [facility]       [1]   Social History  Socioeconomic History    Marital status: Single   Tobacco Use    Smoking status: Every Day     Current packs/day: 0.50     Average packs/day: 0.8 packs/day for 50.4 years (40.9 ttl pk-yrs)     Types: Cigarettes     Start date: 1975     Passive exposure: Current    Smokeless tobacco: Never    Tobacco comments:     States smoke 2-3 cigarettes per day   Substance and Sexual Activity    Alcohol use: Yes      Alcohol/week: 3.0 standard drinks of alcohol     Types: 3 Cans of beer per week     Comment: socially    Drug use: Not Currently     Frequency: 1.0 times per week     Types: Marijuana     Comment: no use in over 1 year    Sexual activity: Not Currently     Partners: Female     Social Drivers of Health     Financial Resource Strain: Low Risk  (4/23/2025)    Overall Financial Resource Strain (CARDIA)     Difficulty of Paying Living Expenses: Not hard at all   Food Insecurity: No Food Insecurity (4/23/2025)    Hunger Vital Sign     Worried About Running Out of Food in the Last Year: Never true     Ran Out of Food in the Last Year: Never true   Transportation Needs: No Transportation Needs (4/23/2025)    PRAPARE - Transportation     Lack of Transportation (Medical): No     Lack of Transportation (Non-Medical): No   Physical Activity: Inactive (12/17/2024)    Exercise Vital Sign     Days of Exercise per Week: 0 days     Minutes of Exercise per Session: 0 min   Stress: No Stress Concern Present (4/23/2025)    [employer] - Occupational Stress Questionnaire     Feeling of Stress : Not at all   Housing Stability: Low Risk  (4/23/2025)    Housing Stability Vital Sign     Unable to Pay for Housing in the Last Year: No     Homeless in the Last Year: No

## 2025-05-19 PROBLEM — E44.0 MODERATE MALNUTRITION: Status: RESOLVED | Noted: 2025-04-22 | Resolved: 2025-05-19

## 2025-05-19 LAB
ANION GAP SERPL CALC-SCNC: 9 MEQ/L
BASOPHILS # BLD AUTO: 0.01 X10(3)/MCL
BASOPHILS NFR BLD AUTO: 0.1 %
BUN SERPL-MCNC: 30.1 MG/DL (ref 8.4–25.7)
CALCIUM SERPL-MCNC: 8.5 MG/DL (ref 8.8–10)
CHLORIDE SERPL-SCNC: 99 MMOL/L (ref 98–107)
CO2 SERPL-SCNC: 21 MMOL/L (ref 23–31)
CREAT SERPL-MCNC: 1.02 MG/DL (ref 0.72–1.25)
CREAT/UREA NIT SERPL: 30
EOSINOPHIL # BLD AUTO: 0 X10(3)/MCL (ref 0–0.9)
EOSINOPHIL NFR BLD AUTO: 0 %
ERYTHROCYTE [DISTWIDTH] IN BLOOD BY AUTOMATED COUNT: 25.4 % (ref 11.5–17)
GFR SERPLBLD CREATININE-BSD FMLA CKD-EPI: >60 ML/MIN/1.73/M2
GLUCOSE SERPL-MCNC: 163 MG/DL (ref 82–115)
HCT VFR BLD AUTO: 27.1 % (ref 42–52)
HGB BLD-MCNC: 8.7 G/DL (ref 14–18)
IMM GRANULOCYTES # BLD AUTO: 0.05 X10(3)/MCL (ref 0–0.04)
IMM GRANULOCYTES NFR BLD AUTO: 0.5 %
LYMPHOCYTES # BLD AUTO: 0.31 X10(3)/MCL (ref 0.6–4.6)
LYMPHOCYTES NFR BLD AUTO: 2.8 %
MAGNESIUM SERPL-MCNC: 1.8 MG/DL (ref 1.6–2.6)
MCH RBC QN AUTO: 24.4 PG (ref 27–31)
MCHC RBC AUTO-ENTMCNC: 32.1 G/DL (ref 33–36)
MCV RBC AUTO: 76.1 FL (ref 80–94)
MONOCYTES # BLD AUTO: 0.67 X10(3)/MCL (ref 0.1–1.3)
MONOCYTES NFR BLD AUTO: 6.1 %
NEUTROPHILS # BLD AUTO: 9.9 X10(3)/MCL (ref 2.1–9.2)
NEUTROPHILS NFR BLD AUTO: 90.5 %
NRBC BLD AUTO-RTO: 0 %
PHOSPHATE SERPL-MCNC: 2 MG/DL (ref 2.3–4.7)
PLATELET # BLD AUTO: 174 X10(3)/MCL (ref 130–400)
PLATELETS.RETICULATED NFR BLD AUTO: 3.1 % (ref 0.9–11.2)
PMV BLD AUTO: 10.8 FL (ref 7.4–10.4)
POCT GLUCOSE: 113 MG/DL (ref 70–110)
POCT GLUCOSE: 138 MG/DL (ref 70–110)
POCT GLUCOSE: 144 MG/DL (ref 70–110)
POCT GLUCOSE: 174 MG/DL (ref 70–110)
POCT GLUCOSE: 174 MG/DL (ref 70–110)
POTASSIUM SERPL-SCNC: 4.8 MMOL/L (ref 3.5–5.1)
RBC # BLD AUTO: 3.56 X10(6)/MCL (ref 4.7–6.1)
SODIUM SERPL-SCNC: 129 MMOL/L (ref 136–145)
WBC # BLD AUTO: 10.94 X10(3)/MCL (ref 4.5–11.5)

## 2025-05-19 PROCEDURE — 97116 GAIT TRAINING THERAPY: CPT

## 2025-05-19 PROCEDURE — 25000003 PHARM REV CODE 250

## 2025-05-19 PROCEDURE — 84100 ASSAY OF PHOSPHORUS: CPT

## 2025-05-19 PROCEDURE — 21400001 HC TELEMETRY ROOM

## 2025-05-19 PROCEDURE — 27000207 HC ISOLATION

## 2025-05-19 PROCEDURE — 36415 COLL VENOUS BLD VENIPUNCTURE: CPT

## 2025-05-19 PROCEDURE — 83735 ASSAY OF MAGNESIUM: CPT

## 2025-05-19 PROCEDURE — 94761 N-INVAS EAR/PLS OXIMETRY MLT: CPT

## 2025-05-19 PROCEDURE — 80048 BASIC METABOLIC PNL TOTAL CA: CPT

## 2025-05-19 PROCEDURE — 85025 COMPLETE CBC W/AUTO DIFF WBC: CPT

## 2025-05-19 PROCEDURE — 27000221 HC OXYGEN, UP TO 24 HOURS

## 2025-05-19 PROCEDURE — 25000242 PHARM REV CODE 250 ALT 637 W/ HCPCS

## 2025-05-19 PROCEDURE — 63600175 PHARM REV CODE 636 W HCPCS

## 2025-05-19 PROCEDURE — 94640 AIRWAY INHALATION TREATMENT: CPT

## 2025-05-19 RX ORDER — AMOXICILLIN 250 MG
1 CAPSULE ORAL 2 TIMES DAILY PRN
Qty: 30 TABLET | Refills: 0 | Status: ON HOLD | OUTPATIENT
Start: 2025-05-19

## 2025-05-19 RX ORDER — DIPHENHYDRAMINE HCL 25 MG
25 CAPSULE ORAL EVERY 6 HOURS PRN
Qty: 30 CAPSULE | Refills: 0 | Status: ON HOLD | OUTPATIENT
Start: 2025-05-19

## 2025-05-19 RX ORDER — ALBUTEROL SULFATE 90 UG/1
2 INHALANT RESPIRATORY (INHALATION) EVERY 6 HOURS PRN
Qty: 8 G | Refills: 0 | Status: ON HOLD | OUTPATIENT
Start: 2025-05-19

## 2025-05-19 RX ORDER — PANTOPRAZOLE SODIUM 40 MG/1
40 TABLET, DELAYED RELEASE ORAL DAILY
Qty: 90 TABLET | Refills: 3 | Status: ON HOLD | OUTPATIENT
Start: 2025-05-20 | End: 2026-05-20

## 2025-05-19 RX ORDER — LANOLIN ALCOHOL/MO/W.PET/CERES
400 CREAM (GRAM) TOPICAL ONCE
Status: COMPLETED | OUTPATIENT
Start: 2025-05-19 | End: 2025-05-19

## 2025-05-19 RX ORDER — BUMETANIDE 1 MG/1
1 TABLET ORAL DAILY PRN
Qty: 15 TABLET | Refills: 11 | Status: SHIPPED | OUTPATIENT
Start: 2025-05-19 | End: 2025-05-20 | Stop reason: HOSPADM

## 2025-05-19 RX ORDER — PREDNISONE 5 MG/1
TABLET ORAL
Qty: 140 TABLET | Refills: 0 | Status: ON HOLD | OUTPATIENT
Start: 2025-05-19 | End: 2025-06-16

## 2025-05-19 RX ORDER — IPRATROPIUM BROMIDE AND ALBUTEROL SULFATE 2.5; .5 MG/3ML; MG/3ML
3 SOLUTION RESPIRATORY (INHALATION) EVERY 6 HOURS PRN
Qty: 360 ML | Refills: 0 | Status: ON HOLD | OUTPATIENT
Start: 2025-05-19

## 2025-05-19 RX ORDER — ATORVASTATIN CALCIUM 80 MG/1
80 TABLET, FILM COATED ORAL DAILY
Qty: 90 TABLET | Refills: 0 | Status: ON HOLD | OUTPATIENT
Start: 2025-05-19 | End: 2025-08-17

## 2025-05-19 RX ORDER — SULFAMETHOXAZOLE AND TRIMETHOPRIM 800; 160 MG/1; MG/1
1 TABLET ORAL 2 TIMES DAILY
Qty: 14 TABLET | Refills: 0 | Status: ON HOLD | OUTPATIENT
Start: 2025-05-19

## 2025-05-19 RX ORDER — QUETIAPINE FUMARATE 100 MG/1
100 TABLET, FILM COATED ORAL NIGHTLY
Qty: 30 TABLET | Refills: 0 | Status: ON HOLD | OUTPATIENT
Start: 2025-05-19 | End: 2025-06-18

## 2025-05-19 RX ORDER — SODIUM,POTASSIUM PHOSPHATES 280-250MG
1 POWDER IN PACKET (EA) ORAL ONCE
Status: COMPLETED | OUTPATIENT
Start: 2025-05-19 | End: 2025-05-19

## 2025-05-19 RX ORDER — CLOPIDOGREL BISULFATE 75 MG/1
75 TABLET ORAL DAILY
Qty: 30 TABLET | Refills: 1 | Status: ON HOLD | OUTPATIENT
Start: 2025-05-19

## 2025-05-19 RX ADMIN — SULFAMETHOXAZOLE AND TRIMETHOPRIM 1 TABLET: 800; 160 TABLET ORAL at 08:05

## 2025-05-19 RX ADMIN — ATORVASTATIN CALCIUM 80 MG: 40 TABLET, FILM COATED ORAL at 08:05

## 2025-05-19 RX ADMIN — RIVAROXABAN 20 MG: 10 TABLET, FILM COATED ORAL at 05:05

## 2025-05-19 RX ADMIN — Medication 1000 UNITS: at 08:05

## 2025-05-19 RX ADMIN — PANTOPRAZOLE SODIUM 40 MG: 40 TABLET, DELAYED RELEASE ORAL at 08:05

## 2025-05-19 RX ADMIN — LEVALBUTEROL HYDROCHLORIDE 1.25 MG: 1.25 SOLUTION RESPIRATORY (INHALATION) at 07:05

## 2025-05-19 RX ADMIN — PREDNISONE 20 MG: 20 TABLET ORAL at 08:05

## 2025-05-19 RX ADMIN — CLOPIDOGREL BISULFATE 75 MG: 75 TABLET, FILM COATED ORAL at 08:05

## 2025-05-19 RX ADMIN — POTASSIUM & SODIUM PHOSPHATES POWDER PACK 280-160-250 MG 1 PACKET: 280-160-250 PACK at 08:05

## 2025-05-19 RX ADMIN — IPRATROPIUM BROMIDE 0.5 MG: 0.5 SOLUTION RESPIRATORY (INHALATION) at 06:05

## 2025-05-19 RX ADMIN — MAGNESIUM OXIDE TAB 400 MG (241.3 MG ELEMENTAL MG) 400 MG: 400 (241.3 MG) TAB at 08:05

## 2025-05-19 RX ADMIN — QUETIAPINE FUMARATE 100 MG: 100 TABLET ORAL at 08:05

## 2025-05-19 RX ADMIN — FOLIC ACID 1 MG: 1 TABLET ORAL at 08:05

## 2025-05-19 RX ADMIN — LEVALBUTEROL HYDROCHLORIDE 1.25 MG: 1.25 SOLUTION RESPIRATORY (INHALATION) at 06:05

## 2025-05-19 RX ADMIN — BUMETANIDE 1 MG: 1 TABLET ORAL at 08:05

## 2025-05-19 RX ADMIN — IPRATROPIUM BROMIDE 0.5 MG: 0.5 SOLUTION RESPIRATORY (INHALATION) at 07:05

## 2025-05-19 NOTE — PROGRESS NOTES
Cardiology Progress Note     Cardiology Attending: Dayanna Johnson MD  Cardiology Fellow: Anand Wilson MD     Date of Admit: 4/21/2025    History of Present Illness:      Ran Reyes Jr. is a 69 y.o. male with NICM-HFrEF (LVEF 20-25%; G3DD), HTN, non obstructive CAD, AF on OAC, PAD, HLD, and COPD who presented with acute on chronic decompensated heart failure in cardiogenic shock. Cardiogenic shock is now resolved.     No acute events overnight. The patient is without acute complaint this morning.    Intermittently Hypotensive but asymptomatic.   Improved leukocytosis, stable normocytic anemia - unremarkable BMP except hyponatremia.   Received Bumex 1 mg oral on 05/19 and 5/18 - hemodynamically tolerated -   Negative 2000 cc over weekend - increase in weight from 202 lb to 208 lb, unsure of accuracy.    Past Medical History:     Past Medical History:   Diagnosis Date    A-fib     Anticoagulant long-term use     Anxiety     Aortic aneurysm     Cataract     CHF (congestive heart failure)     Chronic atrial fibrillation     COPD (chronic obstructive pulmonary disease)     Coronary artery disease     Depression     HLD (hyperlipidemia)     Hypertension     Mitral regurgitation     PAD (peripheral artery disease)     Primary open angle glaucoma (POAG)      Surgical History:     Past Surgical History:   Procedure Laterality Date    ANGIOGRAM, CORONARY, WITH LEFT HEART CATHETERIZATION N/A 03/26/2024    Procedure: Angiogram, Coronary, with Left Heart Cath;  Surgeon: Adriano Montiel MD;  Location: [facility] CATH LAB;  Service: Cardiology;  Laterality: N/A;    ATHERECTOMY OF PERIPHERAL VESSEL Left 09/12/2022    LEFT SFA ATHERECTOMY, BALLOON ANGIOPLASTY    CATARACT EXTRACTION W/  INTRAOCULAR LENS IMPLANT Left 03/09/2023    Procedure: EXTRACTION, CATARACT, WITH IOL INSERTION;  Surgeon: Georgi Borja MD;  Location: [facility];  Service: Ophthalmology;  Laterality: Left;  19.5  mac    EGD, WITH CLOSED BIOPSY   03/22/2024    Procedure: EGD, WITH CLOSED BIOPSY;  Surgeon: Ronald Calderon MD;  Location: [facility] ENDOSCOPY;  Service: Gastroenterology;;    ESOPHAGOGASTRODUODENOSCOPY N/A 03/22/2024    Procedure: EGD;  Surgeon: Ronald Calderon MD;  Location: [facility] ENDOSCOPY;  Service: Gastroenterology;  Laterality: N/A;    Heart Stent N/A     > 10yrs. AGO    INCISION AND DRAINAGE N/A 03/18/2024    Procedure: Incision and Drainage;  Surgeon: Fahad Rivas MD;  Location: [facility] OR;  Service: Urology;  Laterality: N/A;  I&D SCROTAL ABSCESS    INSERTION OF STENT INTO PERIPHERAL VESSEL Right 10/17/2022    RIGHT SFA ATHERECTOMY, BALLOON ANGIOPLASTY, STENT; RIGHT ANTERIOR TIBIAL ARTERY ATHERECTOMY, BALLOON ANGIOPLASTY    ORCHIECTOMY Left 03/18/2024    Procedure: ORCHIECTOMY;  Surgeon: Fahad Rivas MD;  Location: [facility];  Service: Urology;  Laterality: Left;     Allergies:   Review of patient's allergies indicates:  No Known Allergies  Family History:     Family History   Problem Relation Name Age of Onset    Cancer Mother      Hypertension Mother      Heart failure Father      Stroke Father      Hypertension Father      No Known Problems Sister       Social History:   Social History[1]  Review of Systems:     The patient denies headaches, changes in vision, lightheadedness, dizziness, nausea, emesis, fevers, chills, chest pain, dyspnea, palpitations, abdominal pain, or changes in urinary or bowel habits.    Medications:     Home Medications:  Prior to Admission medications    Medication Sig Start Date End Date Taking? Authorizing Provider   aspirin (ECOTRIN) 81 MG EC tablet Take 1 tablet by mouth once daily. 3/14/25  Yes Provider, Historical   olopatadine (PATANOL) 0.1 % ophthalmic solution Apply to eye. 2/7/25  Yes Provider, Historical   sacubitriL-valsartan (ENTRESTO) 49-51 mg per tablet Take 1 tablet by mouth once daily. 2/12/25  Yes Provider, Historical   urea (CARMOL) 40 % Crea Apply topically.  2/13/25  Yes Provider, Historical   varenicline tartrate (CHANTIX) 1 mg Tab Take 0.5 mg by mouth. 2/13/25  Yes Provider, Historical   acetaminophen 325 mg Cap Take 650 mg by mouth 2 (two) times daily as needed.    Provider, Historical   ALBUTEROL INHL Inhale 2 puffs into the lungs every 6 (six) hours as needed.    Provider, Historical   albuterol-ipratropium (DUO-NEB) 2.5 mg-0.5 mg/3 mL nebulizer solution Take 3 mLs by nebulization every 6 (six) hours as needed for Wheezing. Rescue 12/9/24   Diana Hassan MD   atorvastatin (LIPITOR) 80 MG tablet Take 1 tablet (80 mg total) by mouth once daily. 3/4/25 3/4/26  Marino Marquez DO   BREZTRI AEROSPHERE 160-9-4.8 mcg/actuation HFAA Inhale 2 puffs into the lungs 2 (two) times daily. 1/7/25   Provider, Historical   cetirizine (ZYRTEC) 10 MG tablet Take 1 tablet (10 mg total) by mouth once daily. 8/8/24 8/3/25  Abiodun Recinos DO   clopidogreL (PLAVIX) 75 mg tablet Take 1 tablet (75 mg total) by mouth once daily. 3/4/25   Marino Marquez DO   folic acid (FOLVITE) 1 MG tablet Take 1 tablet (1 mg total) by mouth once daily. 4/9/24 4/9/25  Tha Edmond MD   furosemide (LASIX) 40 MG tablet Take 1 tablet (40 mg total) by mouth 2 (two) times a day. 3/4/25   Marino Marquez DO   gabapentin (NEURONTIN) 300 MG capsule Take 300 mg by mouth 3 (three) times daily.    Provider, Historical   metoprolol succinate (TOPROL-XL) 25 MG 24 hr tablet Take 0.5 tablets (12.5 mg total) by mouth once daily. 3/4/25 3/4/26  Marino Marquez DO   nicotine (NICODERM CQ) 14 mg/24 hr Place 1 patch onto the skin daily as needed. 3/4/25   Marino Marquez DO   nicotine (NICODERM CQ) 21 mg/24 hr Place 1 patch onto the skin once daily. 3/20/25   Heaven Page MD   nitrofurantoin, macrocrystal-monohydrate, (MACROBID) 100 MG capsule Take 1 capsule (100 mg total) by mouth 2 (two) times daily. 3/4/25   Marino Marquez DO   pantoprazole (PROTONIX) 40 MG tablet Take 1 tablet (40 mg total) by mouth 2 (two) times a  day. 9/23/24   Abiodun Recinos DO   potassium chloride SA (K-DUR,KLOR-CON) 20 MEQ tablet Take 1 tablet (20 mEq total) by mouth 2 (two) times daily. 1/28/25 1/28/26  Vernon Cifuentes MD   rivaroxaban (XARELTO) 20 mg Tab Take 1 tablet (20 mg total) by mouth Daily. 3/4/25   Marino Marquez DO   sertraline (ZOLOFT) 100 MG tablet Take 100 mg by mouth once daily.    Provider, Historical   vitamin D (VITAMIN D3) 1000 units Tab Take 1,000 Units by mouth once daily.    Provider, Historical       Current/Inpatient Medications:  Infusions:   0.9% NaCl   Intravenous Continuous   Stopped at 05/04/25 2254     Scheduled:   atorvastatin  80 mg Oral Daily    bumetanide  1 mg Oral Daily    clopidogreL  75 mg Oral Daily    ferrous sulfate  1 tablet Oral Q48H    folic acid  1 mg Oral Daily    ipratropium  0.5 mg Nebulization Q12H    levalbuterol  1.25 mg Nebulization Q12H    miconazole NITRATE 2 %   Topical (Top) BID    pantoprazole  40 mg Oral Daily    predniSONE  20 mg Oral BID    QUEtiapine  100 mg Oral QHS    rivaroxaban  20 mg Oral Daily    sulfamethoxazole-trimethoprim 800-160mg  1 tablet Oral BID    vitamin D  1,000 Units Oral Daily     PRN:    Current Facility-Administered Medications:     acetaminophen, 650 mg, Oral, Q4H PRN    aluminum-magnesium hydroxide, 30 mL, Oral, BID PRN    dextrose 50%, 12.5 g, Intravenous, PRN    dextrose 50%, 25 g, Intravenous, PRN    diphenhydrAMINE, 25 mg, Oral, Q6H PRN    glucagon (human recombinant), 1 mg, Intramuscular, PRN    glucose, 16 g, Oral, PRN    glucose, 24 g, Oral, PRN    guaiFENesin 100 mg/5 ml, 200 mg, Oral, Q4H PRN    hydrALAZINE, 10 mg, Intravenous, Q4H PRN    hydrocortisone, , Topical (Top), TID PRN    insulin aspart U-100, 0-5 Units, Subcutaneous, QID (AC + HS) PRN    lorazepam, 1 mg, Intravenous, Q6H PRN    melatonin, 6 mg, Oral, Nightly PRN    naloxone, 0.02 mg, Intravenous, PRN    nicotine, 1 patch, Transdermal, Daily PRN    polyethylene glycol, 17 g, Oral, Daily PRN     prochlorperazine, 5 mg, Intravenous, Q6H PRN    senna-docusate, 1 tablet, Oral, BID PRN    simethicone, 1 tablet, Oral, TID PRN    sodium chloride 0.9%, 10 mL, Intravenous, PRN    traZODone, 25 mg, Oral, Nightly PRN    Objective:     24-hour Vitals:   Temp:  [97.4 °F (36.3 °C)-99.1 °F (37.3 °C)] 97.4 °F (36.3 °C)  Pulse:  [] 65  Resp:  [18-20] 18  SpO2:  [94 %-100 %] 96 %  BP: ()/(63-69) 124/68    Vitals: /68   Pulse 65   Temp 97.4 °F (36.3 °C) (Oral)   Resp 18   Ht 6' (1.829 m)   Wt 94.3 kg (208 lb)   SpO2 96%   BMI 28.21 kg/m²     Intake/Output Summary (Last 24 hours) at 5/19/2025 1830  Last data filed at 5/19/2025 1823  Gross per 24 hour   Intake 650 ml   Output 3400 ml   Net -2750 ml       Physical Exam  Vitals reviewed.   Constitutional:       General: He is not in acute distress.     Appearance: Normal appearance. He is normal weight. He is not ill-appearing.   HENT:      Head: Normocephalic and atraumatic.      Right Ear: External ear normal.      Left Ear: External ear normal.      Nose: Nose normal.      Mouth/Throat:      Mouth: Mucous membranes are moist.   Eyes:      Conjunctiva/sclera: Conjunctivae normal.   Cardiovascular:      Rate and Rhythm: Normal rate. Rhythm irregular.      Pulses: Normal pulses.      Heart sounds: Murmur heard.   Pulmonary:      Effort: Pulmonary effort is normal. No respiratory distress.      Breath sounds: No stridor. No wheezing, rhonchi or rales.   Chest:      Chest wall: No tenderness.   Abdominal:      General: Bowel sounds are normal. There is no distension.      Palpations: Abdomen is soft.   Musculoskeletal:      Cervical back: Neck supple.      Right lower leg: Edema present.      Left lower leg: Edema present.      Comments: Trace LE edema.    Skin:     General: Skin is warm and dry.      Capillary Refill: Capillary refill takes less than 2 seconds.   Neurological:      Mental Status: He is alert and oriented to person, place, and time.    Psychiatric:         Mood and Affect: Mood normal.         Behavior: Behavior normal.        Labs:   I have reviewed the following labs below:    Recent Labs   Lab 05/13/25  0253 05/13/25  1234 05/17/25  0317 05/18/25  0345 05/19/25  0510   WBC 8.38   < > 11.48 11.37 10.94   HGB 7.8*   < > 8.9* 8.8* 8.7*   HCT 24.6*   < > 27.8* 27.9* 27.1*      < > 185 187 174   *   < > 129* 131* 129*   K 4.3   < > 4.3 4.7 4.8   CL 96*   < > 98 97* 99   CO2 25   < > 22* 24 21*   BUN 42.3*   < > 38.7* 33.2* 30.1*   CREATININE 1.06   < > 0.96 0.99 1.02   *   < > 130* 141* 163*   CALCIUM 9.5   < > 9.0 8.8 8.5*   MG 1.80  1.90   < > 2.00 1.90 1.80   PHOS 3.3  3.3   < > 3.2 2.7 2.0*   PROT 7.2  --   --   --   --    ALBUMIN 3.1*  --   --   --   --    BILITOT 4.6*  --   --   --   --    AST 49*  --   --   --   --    ALT 48  --   --   --   --    ALKPHOS 246*  --   --   --   --    TROPONINI 0.018  --   --   --   --     < > = values in this interval not displayed.     Cardiovascular Studies/Imaging:   I have reviewed the following studies below:  TTE:   Summary  Show Result Comparison     Left Ventricle: The left ventricle is severely dilated. Severely increased ventricular mass. Mildly increased wall thickness. There is severe eccentric hypertrophy. Severe global hypokinesis present. There is severely reduced systolic function. Quantitated ejection fraction is 22%. Grade III diastolic dysfunction.    Right Ventricle: The right ventricle has moderate enlargement. Systolic function is moderately reduced.    Left Atrium: Left atrium is severely dilated measuring 92ml/m2.    Right Atrium: Right atrium is severely dilated.    Aortic Valve: The aortic valve is a trileaflet valve. There is mild aortic valve sclerosis. There is no stenosis. There is no significant regurgitation.    Mitral Valve: The mitral valve is structurally normal. There is no stenosis. There is severe central regurgitation.    Tricuspid Valve: The  tricuspid valve is structurally normal. There is moderate regurgitation.    Pulmonary Artery: There is moderate pulmonary hypertension. The estimated pulmonary artery systolic pressure is 59 mmHg.    IVC/SVC: Elevated venous pressure at 15 mmHg.    Pericardium: There is no pericardial effusion.    LHC/Cors: 3/26/2024  Coronary findings:     Dominance: right   Left main:  10-20% calcified distal left main disease  Left anterior descending artery:  50% calcified proximal stenosis  Circumflex artery:  Luminal irregularities  Right coronary artery:  Luminal irregularities    Imaging:   See imaging section for details.     Assessment:     Ran Reyes Jr. is a 69 y.o. male with NICM-HFrEF (LVEF 20-25%; G3DD), HTN, non obstructive CAD, AF on OAC, PAD, HLD, and COPD who presented with acute on chronic decompensated heart failure in cardiogenic shock now resolved.     Plan/Recommendations:   NICM-HFrEF Stage D with Resolved CS and Severe Mitral Regurgitation   -Unable to tolerate GDMT. Symptomatic with attempts to start BB. Hypotensive off of therapies - though attempted ARNi.  -Bumex 1 mg oral today - assess UOP response.   -on discharge, torsemide 20 p.o. daily, instructed to take an extra 20 mg dose if weight increases by > 2 lb overnight.  -Advanced HF evaluation outpatient. [facility] Dr. Jose Angel Kuo.  -LifeVest prior to discharge.     Atrial Fibrillation  -Unable to tolerate BB.   -Continue anticoagulation for CVA risk reduction.     Hypertension  -Hypotensive. See above explanation.      Non Obstructive Epicardial Coronary Artery Disease  Peripheral Artery Disease  -SAPT and high intensity statin therapy.     Anand Xie MD  Internal Medicine - PGY-1              [1]   Social History  Tobacco Use    Smoking status: Every Day     Current packs/day: 0.50     Average packs/day: 0.8 packs/day for 50.4 years (40.9 ttl pk-yrs)     Types: Cigarettes     Start date: 1975     Passive exposure: Current    Smokeless  tobacco: Never    Tobacco comments:     States smoke 2-3 cigarettes per day   Substance Use Topics    Alcohol use: Yes     Alcohol/week: 3.0 standard drinks of alcohol     Types: 3 Cans of beer per week     Comment: socially    Drug use: Not Currently     Frequency: 1.0 times per week     Types: Marijuana     Comment: no use in over 1 year

## 2025-05-19 NOTE — PROGRESS NOTES
Inpatient Nutrition Assessment    Admit Date: 4/21/2025   Total duration of encounter: 28 days   Patient Age: 69 y.o.    Nutrition Recommendation/Prescription     Continue heart healthy/ 1.5 L fluid restriction; encouraged oral intake   continue boost plus -1 carton daily; Boost Plus (provides 360 kcal, 14 g protein per serving) . Pt request extra gravy/sauce; food too dry; will accommodate   Electrolyte replacement as needed   MVI/fe  Biweekly wt  Pt education on diet complete  Will monitor nutrition status     Communication of Recommendations: reviewed with nurse and reviewed with patient    Nutrition Assessment     Malnutrition Assessment/Nutrition-Focused Physical Exam    Malnutrition Context: chronic illness (05/13/25 1312)  Malnutrition Level: moderate (05/13/25 1312)  Energy Intake (Malnutrition): less than 75% for greater than or equal to 1 month (05/13/25 1312)  Weight Loss (Malnutrition): greater than 5% in 1 month (05/13/25 1312)  Subcutaneous Fat (Malnutrition): mild depletion (05/13/25 1312)  Orbital Region (Subcutaneous Fat Loss): mild depletion  Upper Arm Region (Subcutaneous Fat Loss): mild depletion     Muscle Mass (Malnutrition): mild depletion (05/13/25 1312)     Clavicle Bone Region (Muscle Loss): mild depletion         Fluid Accumulation (Malnutrition): mild (05/13/25 1312)     Hand  Strength, Right (Malnutrition): Unable to assess (05/13/25 1312)  A minimum of two characteristics is recommended for diagnosis of either severe or non-severe malnutrition.    Chart Review    Reason Seen: continuous nutrition monitoring and follow-up    Malnutrition Screening Tool Results   Have you recently lost weight without trying?: No  Have you been eating poorly because of a decreased appetite?: No   MST Score: 0   Diagnosis:  Afib, CAD, heart failure, pulmonary HTN, volume overload, hyponatremia, ELIZABETH, anion gap acidemia, abnormal UA, PVD, anemia, COPD, glaucoma ; cardiogenic shock , sepsis    Relevant  Medical History: tobacco use, COPD, HTN, HLD, Afib, heart failure, PVD, fourniers gangrene    Scheduled Medications:  atorvastatin, 80 mg, Daily  bumetanide, 1 mg, Daily  clopidogreL, 75 mg, Daily  ferrous sulfate, 1 tablet, Q48H  folic acid, 1 mg, Daily  ipratropium, 0.5 mg, Q12H  levalbuterol, 1.25 mg, Q12H  miconazole NITRATE 2 %, , BID  pantoprazole, 40 mg, Daily  predniSONE, 20 mg, BID  QUEtiapine, 100 mg, QHS  rivaroxaban, 20 mg, Daily  sulfamethoxazole-trimethoprim 800-160mg, 1 tablet, BID  vitamin D, 1,000 Units, Daily    Continuous Infusions:  0.9% NaCl, Last Rate: Stopped (05/04/25 2254)    PRN Medications:  acetaminophen, 650 mg, Q4H PRN  aluminum-magnesium hydroxide, 30 mL, BID PRN  dextrose 50%, 12.5 g, PRN  dextrose 50%, 25 g, PRN  diphenhydrAMINE, 25 mg, Q6H PRN  glucagon (human recombinant), 1 mg, PRN  glucose, 16 g, PRN  glucose, 24 g, PRN  guaiFENesin 100 mg/5 ml, 200 mg, Q4H PRN  hydrALAZINE, 10 mg, Q4H PRN  hydrocortisone, , TID PRN  insulin aspart U-100, 0-5 Units, QID (AC + HS) PRN  lorazepam, 1 mg, Q6H PRN  melatonin, 6 mg, Nightly PRN  naloxone, 0.02 mg, PRN  nicotine, 1 patch, Daily PRN  polyethylene glycol, 17 g, Daily PRN  prochlorperazine, 5 mg, Q6H PRN  senna-docusate, 1 tablet, BID PRN  simethicone, 1 tablet, TID PRN  sodium chloride 0.9%, 10 mL, PRN  traZODone, 25 mg, Nightly PRN    Calorie Containing IV Medications: no significant kcals from medications at this time    Recent Labs   Lab 05/13/25  0253 05/13/25  1234 05/14/25  0329 05/14/25  0929 05/15/25  0323 05/15/25  0902 05/15/25  1214 05/15/25  1553 05/15/25  2028 05/16/25  0317 05/17/25  0317 05/18/25  0345 05/19/25  0510   *   < > 133*   < > 131*   < > 133* 133* 133* 132* 129* 131* 129*   K 4.3   < > 3.3*   < > 3.5   < > 3.8 3.8 3.6 3.6 4.3 4.7 4.8   CALCIUM 9.5   < > 9.2   < > 9.3   < > 9.6 9.4 9.4 9.0 9.0 8.8 8.5*   PHOS 3.3  3.3   < > 3.6   < > 3.9   < > 3.6 3.6 3.7 3.6 3.2 2.7 2.0*   MG 1.80  1.90   < > 1.80   < >  1.80   < > 2.30 2.30 2.20 2.10 2.00 1.90 1.80   CL 96*   < > 99   < > 98   < > 98 99 99 98 98 97* 99   CO2 25   < > 24   < > 22*   < > 24 24 24 23 22* 24 21*   BUN 42.3*   < > 42.8*   < > 52.8*   < > 49.7* 47.7* 47.2* 47.7* 38.7* 33.2* 30.1*   CREATININE 1.06   < > 0.97   < > 1.19   < > 1.18 1.05 1.21 1.21 0.96 0.99 1.02   EGFRNORACEVR >60   < > >60   < > >60   < > >60 >60 >60 >60 >60 >60 >60   BILITOT 4.6*  --   --   --   --   --   --   --   --   --   --   --   --    ALKPHOS 246*  --   --   --   --   --   --   --   --   --   --   --   --    ALT 48  --   --   --   --   --   --   --   --   --   --   --   --    AST 49*  --   --   --   --   --   --   --   --   --   --   --   --    ALBUMIN 3.1*  --   --   --   --   --   --   --   --   --   --   --   --    WBC 8.38  --  11.76*  --  14.74*  --   --   --   --  14.83* 11.48 11.37 10.94   HGB 7.8*  --  8.3*  --  8.8*  --   --   --   --  9.3* 8.9* 8.8* 8.7*   HCT 24.6*  --  25.3*  --  27.7*  --   --   --   --  29.5* 27.8* 27.9* 27.1*    < > = values in this interval not displayed.     Nutrition Orders:  Diet Heart Healthy Fluid - 1500mL; Standard Tray  Dietary nutrition supplements Daily; Boost Plus Nutritional Drink - Rich Chocolate    Appetite/Oral Intake: fair/50-75% of meals  Factors Affecting Nutritional Intake: decreased appetite and shortness of breath  Social Needs Impacting Access to Food: none identified  Food/Christian/Cultural Preferences: none reported  Food Allergies: none reported  Last Bowel Movement: 05/18/25  Wound(s):  none    Comments  (5/19) Pt down graded to telemetry; po intake fluctuates; nurse reported pt does eat snacks at night; drinking some of ONS. Noted wt elevated; ? Bedscale vs fluid. Pt remains on diuretic. Noted P (L)--replace as needed. Case management assisting with discharge --home vs rehab.     (5/15) Pt po intake continues to fluctuate --depending on meal served; pt not liking food; pt did eat well for breakfast; he likes more of  breakfast items being served; encouraged pt to select lunch/dinner items with nutrition  during rounding and drink ONS. Noted Gluc (H)--monitor. Case management working on possible SNF placement.     (5/13) Pt ate well for breakfast today; 100% of meal; overall po intake --fair--up/down; food too dry; will allow extra gravy/sauce to moisten food; noted wt decreased; partly fluid /partly wt loss--9% decrease from UBW. Pt is drinking ONS. LFTs remain elevated. Case management --working on possible SNF placement.    (5/9) Pt laying in bed; reported appetite --fluctuating --up/down; ate part of breakfast this am; drinking boost supplement. Bed wt decreased 88.6kg--on diuretic; wt has been fluctuating. Encouraged oral intake/supplement use. Labs acknowledged: Gluc (H)--monitor; Alk Phos /Tbili remain elevated.     (5/5) Pt sitting in chair; reported appetite up/down; not liking food; encouraged pt to select food options with nutrition ; pt eating approx 50% meals but does drink ONS. Wt stable 91.5kg (5-3). Labs acknowledged: Gluc (H)--no hx DM; LFTs remain elevated. H/H (L)--on Fe supplement. + LE edema; on diuretic. Current diet/ONS appropriate.     (5/1) Pt reported he ate well for breakfast; depends what is on menu; appetite fair/good; drinking ONS; wt fluctuates with fluid; noted low K--on electrolyte repletion. Tbili remains elevated. Current diet tx appropriate.    (4/29) Pt sitting in chair during rounds ; eating peanut butter cookies; reported not liking food; wants gravy on meat; will honor request; ate eggs and sausage for breakfast; fair po intake; pt is drinking ONS. No new wt--in chair/unable to weigh. H/h(L)--consider fe : Tbili remains elevated. Pt remains on levophed.     (4/25) Pt tolerating oral diet; po intake remains fair; encouraged ONS; noted low K/Mg--suggest electrolyte repletion. Pt remains on levophed/pressor support. Tbili--elevated. Pt wt 196#; diuresis.     (4/22) Pt reported  fair po intake; eating 50-75% meals; + LE edema; mild fat/muscle wasting; wt fluctuates with fluid; on diuretic; UBW between 205-225#. Pt willing to drink ONS; will order once daily --need to monitor fluid volume. Pt education complete on diet tx.     Anthropometrics    Height: 6' (182.9 cm), Height Method: Stated  Last Weight: 94.3 kg (208 lb) (25 0600), Weight Method: Bed Scale  BMI (Calculated): 28.2  BMI Classification: overweight (BMI 25-29.9)        Ideal Body Weight (IBW), Male: 178 lb     % Ideal Body Weight, Male (lb): 109.37 %                 Usual Body Weight (UBW), k kg (wt fluctuates 205-225#; fluid)  % Usual Body Weight: 100     Usual Weight Provided By: patient and EMR weight history    Wt Readings from Last 5 Encounters:   25 94.3 kg (208 lb)   25 102.1 kg (225 lb)   25 101.2 kg (223 lb)   25 102.5 kg (225 lb 14.4 oz)   02/15/25 103.6 kg (228 lb 6.3 oz)     Weight Change(s) Since Admission: -225#  Wt Readings from Last 1 Encounters:   25 06 94.3 kg (208 lb)   25 06 91.9 kg (202 lb 9.6 oz)   25 0600 85 kg (187 lb 6.3 oz)   05/10/25 0523 89.1 kg (196 lb 6.9 oz)   25 06 88.6 kg (195 lb 5.2 oz)   25 1928 93.7 kg (206 lb 9.1 oz)   25 06 91.5 kg (201 lb 12.8 oz)   25 0700 90.3 kg (199 lb 1.2 oz)   25 0600 90.3 kg (199 lb 1.2 oz)   25 1030 88.3 kg (194 lb 10.7 oz)   25 0547 88.3 kg (194 lb 10.7 oz)   25 0600 89.1 kg (196 lb 6.9 oz)   25 0555 89.1 kg (196 lb 6.9 oz)   25 0502 87.8 kg (193 lb 9 oz)   25 0610 92.8 kg (204 lb 9.6 oz)   25 1709 96.6 kg (213 lb)   25 1137 97.5 kg (215 lb)   Admit Weight: 97.5 kg (215 lb) (25 1137), Weight Method: Stated    Estimated Needs    Weight Used For Calorie Calculations: 85 kg (187 lb 6.3 oz)  Energy Calorie Requirements (kcal): 2295 kcal/d; 27 kcal/kg  Energy Need Method: Kcal/kg  Weight Used For Protein Calculations: 85  kg (187 lb 6.3 oz)  Protein Requirements: 102 gm prtotein/d; 1.2 gm/kg  Fluid Requirements (mL): 2125 ml/d; 25 ml/kg /CHF        Enteral Nutrition     Patient not receiving enteral nutrition at this time.    Parenteral Nutrition     Patient not receiving parenteral nutrition support at this time.    Evaluation of Received Nutrient Intake    Calories: not meeting estimated needs; (5/19) po intake fair   Protein: not meeting estimated needs    Patient Education     Education Provided: heart healthy diet  Teaching Method: explanation and printed materials  Comprehension: verbalizes understanding  Barriers to Learning: none evident  Expected Compliance: fair  Comments: All questions were answered and dietitian's contact information was provided.     Nutrition Diagnosis     PES: Inadequate oral intake related to chronic illness as evidenced by eating 75% or less meals . (active)     PES: Moderate chronic disease or condition related malnutrition Related to chronic illness  As Evidenced by:  - energy intake: < 75% for 3 weeks (meets criteria for < 75% for > 7 days (moderate - acute)) - muscle mass depletion: 2 areas of mild muscle loss (Trapezius, Clavicle) - loss of subcutaneous fat: 3 areas of mild fat loss (Buccal, Triceps Skinfold, Infraorbital) - fluid accumulation: 1 area of mild fluid accumulation (Lower extremities edema) active    Nutrition Interventions     Intervention(s): modified composition of meals/snacks, multivitamin/mineral supplement therapy, purpose of nutrition education, and collaboration with other providers  Intervention(s): Oral diet/nutrient modifications;Nutrition education;Care coordination or referral;Oral nutritional supplement    Goal: Meet greater than 80% of nutritional needs by follow-up. (goal progressing)  Goal: Maintain weight throughout hospitalization. (goal progressing)    Nutrition Goals & Monitoring     Dietitian will monitor: food and beverage intake, weight, and food/nutrition  knowledge skill  Discharge planning: continue heart healthy diet with boost plus  oral supplements  Nutrition Risk/Follow-Up: patient at increased nutrition risk; dietitian will follow-up twice weekly   Please consult if re-assessment needed sooner.

## 2025-05-19 NOTE — NURSING
Spoke with patients care giver earlier today about patient discharge and appoint tomorrow that he must not miss. She stated that she did not know if she could pick him up from the hospital and was under the understanding that he was going to a facility. Patient is too high functioning and refuses placement. She stated that she would try to call other family members and get back with me. As of this time I have not received a return call about discharge. I offered to dc patient via cab but he stated that he does not have any clothes or keys to get into the house.

## 2025-05-19 NOTE — PLAN OF CARE
Problem: Skin Injury Risk Increased  Goal: Skin Health and Integrity  Outcome: Progressing     Problem: Adult Inpatient Plan of Care  Goal: Plan of Care Review  Outcome: Progressing  Goal: Patient-Specific Goal (Individualized)  Outcome: Progressing  Goal: Absence of Hospital-Acquired Illness or Injury  Outcome: Progressing  Goal: Optimal Comfort and Wellbeing  Outcome: Progressing  Goal: Readiness for Transition of Care  Outcome: Progressing     Problem: COPD (Chronic Obstructive Pulmonary Disease)  Goal: Optimal Chronic Illness Coping  Outcome: Progressing  Goal: Optimal Level of Functional [place]  Outcome: Progressing  Goal: Absence of Infection Signs and Symptoms  Outcome: Progressing  Goal: Improved Oral Intake  Outcome: Progressing     Problem: Heart Failure  Goal: Optimal Coping  Outcome: Progressing  Goal: Optimal Cardiac Output  Outcome: Progressing  Goal: Stable Heart Rate and Rhythm  Outcome: Progressing  Goal: Optimal Functional Ability  Outcome: Progressing  Goal: Fluid and Electrolyte Balance  Outcome: Progressing  Goal: Improved Oral Intake  Outcome: Progressing     Problem: Fall Injury Risk  Goal: Absence of Fall and Fall-Related Injury  Outcome: Progressing     Problem: Wound  Goal: Optimal Coping  Outcome: Progressing  Goal: Optimal Functional Ability  Outcome: Progressing  Goal: Absence of Infection Signs and Symptoms  Outcome: Progressing  Goal: Improved Oral Intake  Outcome: Progressing  Goal: Optimal Pain Control and Function  Outcome: Progressing  Goal: Skin Health and Integrity  Outcome: Progressing  Goal: Optimal Wound Healing  Outcome: Progressing     Problem: Acute Kidney Injury/Impairment  Goal: Fluid and Electrolyte Balance  Outcome: Progressing  Goal: Improved Oral Intake  Outcome: Progressing  Goal: Effective Renal Function  Outcome: Progressing     Problem: Dysrhythmia  Goal: Normalized Cardiac Rhythm  Outcome: Progressing     Problem: Infection  Goal: Absence of Infection  Signs and Symptoms  Outcome: Progressing

## 2025-05-19 NOTE — PLAN OF CARE
05/19/25 1537   Medicare Message   Important Message from Medicare regarding Discharge Appeal Rights Given to patient/caregiver;Explained to patient/caregiver;Signed/date by patient/caregiver   Date IMM was signed 05/19/25   Time IMM was signed 1536

## 2025-05-19 NOTE — PT/OT/SLP PROGRESS
"Physical Therapy Treatment    Patient Name:  Ran Reyes Jr.   MRN:  [id number]    Recommendations     Therapy Intensity Recommendations at Discharge: Low Intensity Therapy  Discharge Equipment Recommendations: none   Barriers to discharge: fall risk, decreased endurance, decreased safety awareness, decreased caregiver support, and complicated medical history    Assessment     Ran Reyes Jr. is a 69 y.o. male admitted with a medical diagnosis of  HFrEF (heart failure with reduced ejection fraction).  1. ELIZABETH (acute kidney injury)    2. Weakness    3. Dyspnea    4. Screening due    5. HFrEF (heart failure with reduced ejection fraction)    6. Acidemia    7. Microcytic anemia    8. Tobacco dependency    9. Acute on chronic combined systolic and diastolic congestive heart failure    10. Bradycardia    11. QT prolongation    12. Heart failure with reduced ejection fraction    13. Heart failure    14. At risk for long QT syndrome    15. Chest pain    16. Acute combined systolic and diastolic congestive heart failure    17. Nonrheumatic mitral valve regurgitation    18. Syncope       Problem List[1]   He presents with the following impairments/functional limitations:  weakness, impaired endurance, impaired functional mobility, gait instability, decreased safety awareness, impaired balance, impaired cardiopulmonary response to activity.    Pt. Exhibited loss of balance when sitting down in the bed.  When PT asked, "What happened?", pt. Became agitated and responded in a loud angry voice.      Rehab Prognosis: Fair.    Patient would benefit from continued skilled acute PT services to: address above listed impairments/functional limitations; receive patient/caregiver education; reduce fall risk; and maximize independency/safety with functional mobility.    Recent Surgery: * No surgery found *      Plan     During this hospitalization, patient to be seen 5 x/week to address the identified impairments/functional " "limitations via gait training, therapeutic activities, therapeutic exercises and progress toward the established goals.    Plan of Care Expires:   (Discharge)    Subjective     Communicated with patient's nurse (Greg) prior to session.    Patient agreeable to participate in treatment session.    Chief Complaint: "Why ya'll keep asking the same questions?!"    Patient/Family Comments/goals: None stated.  Pain/Comfort:  Pain Rating 1: 0/10  Pain Rating Post-Intervention 1: 0/10    Objective     Patient found supine in bed and with HOB elevated with telemetry, peripheral IV (Life vest)  upon PT entry to room.    General Precautions: Standard, fall, contact   Orthopedic Precautions:N/A   Braces:  N/A  Respiratory Status: room air    Functional Mobility:    Bed Mobility:  Supine to Sit: modified independence    Transfers:  Sit to Stand: modified independence with rollator  Stand to Sit: minimum assistance with rollator, secondary to loss of balance and landed in bed    Gait:  Patient ambulated 260ft with rollator and stand by assistance.  Patient demonstrates :       steady gait       occasional unsteady gait.    Other Mobility:  N/A    Patient left sitting edge of bed with all lines intact, call button in reach, tray table at bedside, and patient's nurse notified.    DME Justifications:  No DME recommended requiring DME justifications    Education     Patient educated on and assisted with functional mobility as noted above.  Patient educated on PT Plan of Care.  Patient was instructed to utilize staff assistance for mobility/transfers.  White board updated regarding patient's safest level of mobility with staff assistance    Goals     Multidisciplinary Problems       Physical Therapy Goals          Problem: Physical Therapy    Goal Priority Disciplines Outcome Interventions   Physical Therapy Goal     PT, PT/OT Progressing    Description: REVIEWED 05/18/2025  Goals to be met by: DISCHARGE  Patient will increase " functional independence with mobility by performin. Sit <=> stand w/ RW at [facility]. - MET 2025  2. Bed <=> chair w/ RW at [facility]. - MET 2025  3. Ambulate 130' w/ RW at [facility]. - PROGRESSING                     Time Tracking     PT Received On: 25  PT Start Time: 1331     PT Stop Time: 1341  PT Total Time (min): 10 min     Billable Minutes: Gait Training 10 minutes    2025       [1]   Patient Active Problem List  Diagnosis    Primary open angle glaucoma (POAG) of right eye, moderate stage    Combined forms of age-related cataract of left eye    Arteriosclerosis of coronary artery    Chronic atrial fibrillation    Dyslipidemia    Hypertension    Tobacco use    PVD (peripheral vascular disease)    VHD (valvular heart disease)    COVID-19    HFrEF (heart failure with reduced ejection fraction)    Nonrheumatic mitral valve regurgitation    Postoperative eye state    Scrotal hematoma    Chronic obstructive pulmonary disease, unspecified    Lesion of external ear    Low back pain    Other thrombophilia    Secondary hyperaldosteronism    Positive colorectal cancer screening using Cologuard test    Acute heart failure    History of COPD    CHF (congestive heart failure)    Acute cystitis with hematuria    Tobacco dependency

## 2025-05-19 NOTE — DISCHARGE SUMMARY
[facility] Medicine Discharge Summary    Admitting Physician: Lennox Vinson MD  Attending Physician: Trisha German MD  Date of Admit: 4/21/2025  Date of Discharge: 5/19/2025    Condition: Fair  Outcome: Patient tolerated treatment/procedure well without complication and is now ready for discharge.  DISPOSITION: Home with close follow up.         Discharge Diagnoses:     Problem List[1]    Principal Problem:  HFrEF (heart failure with reduced ejection fraction)    Consultants and Procedures:     Consultants:  IP CONSULT TO HOSPITAL MEDICINE  IP CONSULT TO SOCIAL WORK/CASE MANAGEMENT  IP CONSULT TO SOCIAL WORK/CASE MANAGEMENT  IP CONSULT TO CARDIOLOGY  IP CONSULT TO PICC TEAM ([facility]AS)  IP CONSULT TO SOCIAL WORK/CASE MANAGEMENT  IP CONSULT TO SOCIAL WORK/CASE MANAGEMENT  IP CONSULT TO PSYCHIATRY  IP CONSULT TO SOCIAL WORK/CASE MANAGEMENT    Procedures:   * No surgery found *      Brief Admission History:      Ran Reyes Jr. is a 69 y.o. 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 Patient reports he can only ambulate about 20-30 feet before having to stop due to claudication pain which is chronic and unchanged compared to baseline. Since running out of his diuretic medications approximately 5 days ago he has noted progressively worsening lower extremity swelling causing leg heaviness and weakness exacerbating difficulty ambulating.  He also notes acute on chronic cough productive of a brownish sputum  without hemoptysis.  Denies fever, chills, sweats.  Denies chest pain.  Endorses shortness of breath at rest and with ambulation but unchanged compared to baseline.  Denies abdominal pain, nausea, vomiting, constipation, diarrhea.  Denies increased or decreased urinary frequency, dysuria, or hematuria.  Sleeps with 2 pillows at night due to baseline orthopnea. Has not had to increase number of pillows or change sleeping habits. Wears 2L NC home oxygen at baseline for hx of COPD. Denies having increased oxygen requirements prior to ED presentation.     ED course:  Vital signs stable.  Oxygenation stable on 2 L nasal cannula.  CBC shows microcytic anemic (HGB 9.0; MCV 77).  CMP shows hyponatremia (Na 128), acidemia (CO2 14), elevated renal indices (BUN 25; creatinine 1.93), elevated alkaline phosphatase (). Troponin WNL.  BNP 26 39.5.  UDS negative.  EKG showed atrial fibrillation with PVCs.  Chest x-ray difficult to interpret due to overlying medical devices however appears to show cardiomegaly with bilateral pulmonary vascular congestion with question of bilateral pleural effusions. He was admitted for acute CHF exacerbation and cardiorenal syndrome.     Hospital Course with Pertinent Findings:      On 04/23/2025, patient was admitted to the ICU was started on pressors inotropes for cardiogenic shock/septic shock.  On 05/02/2025, patient was downgraded to .  On 05/03/2025, patient was Re upgraded to the ICU due to worsening lactate in agonal breathing.  He was placed on dobutamine infusion at that time.  On 05/13/2025, patient was weaned off of dobutamine.  The next day, patient was downgraded from the ICU.  Patient had difficulty tolerating diuretics as well as components of GDMT.  04/30/2025 transthoracic echo revealed a ejection fraction of 22%, grade 3 diastolic dysfunction, severely dilated left ventricle, severe global hypokinesis, moderately enlarged right ventricle, severely dilated atria.  PASP of  59 mm Hg.  Adjusted mean arterial pressure goal down to 60.  Even then, patient had trouble tolerating small doses of metoprolol.  Patient's blood pressure slightly improved towards the end of his stay with maps measuring in the 70s to even mid 80s.  At this time, patient is also continued on rivaroxaban.  Patient was also started on steroids due to noted adrenal insufficiency during his stay.  Patient is currently on prednisone tablet 20 mg b.i.d. on day of discharge.  Case was coordinated with Cardiology closely.  Agree that patient was unable to tolerate GDM T at this current time.  Patient is hypotensive office therapies at baseline and becomes symptomatic with attempts to start GDM T components.  Plan to continue life vest usage at this time, optimized for advanced heart failure evaluation outpatient had to lean: Dr. Jose Angel Kuo.  Continued on anticoagulation due to AFib and inability to tolerate beta-blockers.  Continued on SAPT and high-intensity statin.  Discussions were had with the patient regarding risks, benefits, goals.  Current plan is for patient to follow in Cardiology Clinic morning after discharge to optimize patient for evaluation of advanced heart failure in [place].  During our discussions, patient understood risks, benefits, alternatives and confirmed that plan is in alignment with his wishes and goals.    Discharge physical exam:  Vitals  BP: 124/68  Temp: 97.4 °F (36.3 °C)  Temp Source: Oral  Pulse: 65  Resp: 18  SpO2: 96 %  Height: 6' (182.9 cm)  Weight: 94.3 kg (208 lb)    Physical Exam  Constitutional:       General: He is not in acute distress.     Appearance: Normal appearance. He is not ill-appearing.   HENT:      Head: Normocephalic and atraumatic.   Eyes:      General: No scleral icterus.        Right eye: No discharge.         Left eye: No discharge.      Extraocular Movements: Extraocular movements intact.      Conjunctiva/sclera: Conjunctivae normal.      Pupils: Pupils are  equal, round, and reactive to light.   Cardiovascular:      Rate and Rhythm: Normal rate. Rhythm irregular.      Pulses: Normal pulses.      Heart sounds: Murmur heard.      No friction rub. No gallop.   Pulmonary:      Effort: Pulmonary effort is normal. No respiratory distress.      Breath sounds: Normal breath sounds. No stridor. No wheezing, rhonchi or rales.   Abdominal:      General: Abdomen is flat. Bowel sounds are normal. There is no distension.      Palpations: Abdomen is soft.      Tenderness: There is no abdominal tenderness. There is no guarding.   Musculoskeletal:         General: Swelling present. Normal range of motion.      Right lower leg: Edema present.      Left lower leg: Edema present.      Comments: 1+ pitting edema to BLE   Skin:     General: Skin is warm and dry.   Neurological:      General: No focal deficit present.      Mental Status: He is alert and oriented to person, place, and time.     TIME SPENT ON DISCHARGE: 35 minutes    Discharge Medications:         Medication List        START taking these medications      albuterol 90 mcg/actuation inhaler  Commonly known as: PROVENTIL/VENTOLIN HFA  Inhale 2 puffs into the lungs every 6 (six) hours as needed for Shortness of Breath or Wheezing. Rescue  Replaces: ALBUTEROL INHL     bumetanide 1 MG tablet  Commonly known as: BUMEX  Take 1 tablet (1 mg total) by mouth daily as needed. Take 1 tablet every other day scheduled. Take 1 tablet on in between days as needed if you have weight gain of 5 pounds or more.     diphenhydrAMINE 25 mg capsule  Commonly known as: BENADRYL  Take 1 capsule (25 mg total) by mouth every 6 (six) hours as needed for Itching.     predniSONE 5 MG tablet  Commonly known as: DELTASONE  Take 4 tablets (20 mg total) by mouth 2 (two) times daily for 7 days, THEN 3 tablets (15 mg total) 2 (two) times daily for 7 days, THEN 2 tablets (10 mg total) 2 (two) times daily for 7 days, THEN 1 tablet (5 mg total) 2 (two) times daily  for 7 days.  Start taking on: May 19, 2025     QUEtiapine 100 MG Tab  Commonly known as: SEROQUEL  Take 1 tablet (100 mg total) by mouth every evening.     senna-docusate 8.6-50 mg per tablet  Commonly known as: PERICOLACE  Take 1 tablet by mouth 2 (two) times daily as needed for Constipation (Second line).     sulfamethoxazole-trimethoprim 800-160mg 800-160 mg Tab  Commonly known as: BACTRIM DS  Take 1 tablet by mouth 2 (two) times daily.            CHANGE how you take these medications      albuterol-ipratropium 2.5 mg-0.5 mg/3 mL nebulizer solution  Commonly known as: DUO-NEB  Take 3 mLs by nebulization every 6 (six) hours as needed for Shortness of Breath (For use when shortness of breath persists following inhaler usage). Rescue  What changed: reasons to take this     pantoprazole 40 MG tablet  Commonly known as: PROTONIX  Take 1 tablet (40 mg total) by mouth once daily.  Start taking on: May 20, 2025  What changed: when to take this            CONTINUE taking these medications      acetaminophen 325 mg Cap     atorvastatin 80 MG tablet  Commonly known as: LIPITOR  Take 1 tablet (80 mg total) by mouth once daily.     BREZTRI AEROSPHERE 160-9-4.8 mcg/actuation [facility]  Generic drug: budesonide-glycopyr-formoterol     clopidogreL 75 mg tablet  Commonly known as: PLAVIX  Take 1 tablet (75 mg total) by mouth once daily.     folic acid 1 MG tablet  Commonly known as: FOLVITE  Take 1 tablet (1 mg total) by mouth once daily.     gabapentin 300 MG capsule  Commonly known as: NEURONTIN     olopatadine 0.1 % ophthalmic solution  Commonly known as: PATANOL     potassium chloride SA 20 MEQ tablet  Commonly known as: K-DUR,KLOR-CON  Take 1 tablet (20 mEq total) by mouth 2 (two) times daily.     rivaroxaban 20 mg Tab  Commonly known as: XARELTO  Take 1 tablet (20 mg total) by mouth Daily.     vitamin D 1000 units Tab  Commonly known as: VITAMIN D3            STOP taking these medications      ALBUTEROL INHL  Replaced by:  albuterol 90 mcg/actuation inhaler     aspirin 81 MG EC tablet  Commonly known as: ECOTRIN     cetirizine 10 MG tablet  Commonly known as: ZYRTEC     furosemide 40 MG tablet  Commonly known as: LASIX     metoprolol succinate 25 MG 24 hr tablet  Commonly known as: TOPROL-XL     nicotine 14 mg/24 hr  Commonly known as: NICODERM CQ     nicotine 21 mg/24 hr  Commonly known as: NICODERM CQ     nitrofurantoin (macrocrystal-monohydrate) 100 MG capsule  Commonly known as: MACROBID     sacubitriL-valsartan 49-51 mg per tablet  Commonly known as: ENTRESTO            ASK your doctor about these medications      sertraline 100 MG tablet  Commonly known as: ZOLOFT     urea 40 % Crea  Commonly known as: CARMOL     varenicline tartrate 1 mg Tab  Commonly known as: CHANTIX               Where to Get Your Medications        These medications were sent to [address] [address]      Phone: [phone]   albuterol 90 mcg/actuation inhaler  albuterol-ipratropium 2.5 mg-0.5 mg/3 mL nebulizer solution  atorvastatin 80 MG tablet  bumetanide 1 MG tablet  clopidogreL 75 mg tablet  diphenhydrAMINE 25 mg capsule  pantoprazole 40 MG tablet  predniSONE 5 MG tablet  QUEtiapine 100 MG Tab  rivaroxaban 20 mg Tab  senna-docusate 8.6-50 mg per tablet  sulfamethoxazole-trimethoprim 800-160mg 800-160 mg Tab         Discharge Instructions:         Ran Reyes Jr. is being discharged Home or Self Care.    Continue medications as directed during our discussions including:   -prednisone 20 mg twice a day for 7 days and taper afterwards by reducing prednisone dose by 5 mg every week.  Therefore, dose will be 20 mg twice a day for 7 days, 15 mg twice a day for 7 days, 10 mg twice a day for 7 days, 5 mg twice a day for 7 days.  Patient may continue to discuss prednisone dosage with PCP and Endocrinology.    -bumetanide (Bumex) 1 mg every other day.  As  "instructed during our discussions, may appear as "as needed" but instructed to take 1 mg every other day.  If patient notices a weight gain of 5 lb or more of fluid weight during daily weigh ins, patient may increase dosing frequency to every day until fluid status is controlled.    -Bactrim 800-160 mg b.i.d. prescribed for PJP prophylaxis on prolonged steroid use.  If prednisone is continued at increase strength, patient may need extension of Bactrim course.    Discharge Procedure Orders   Ambulatory referral/consult to Endocrinology   Standing Status: Future   Referral Priority: Routine Referral Type: Consultation   Requested Specialty: Endocrinology   Number of Visits Requested: 1     Ambulatory referral/consult to Internal Medicine   Standing Status: Future   Referral Priority: Routine Referral Type: Consultation   Referral Reason: Specialty Services Required   Requested Specialty: Internal Medicine   Number of Visits Requested: 1     Reason for not Ordering Smoking Cessation Referral     Order Specific Question Answer Comments   Reason for not ordering: Not medically appropriate at this time      Reason for not Prescribing Nicotine Replacement     Order Specific Question Answer Comments   Reason for not Prescribing: Not medically appropriate at this time         Follow-Up Appointments:   Contact information for follow-up providers       [facility] - Emergency Dept Follow up.    Specialty: Emergency Medicine  Why: If symptoms worsen  Contact information:  [facility] [id number] [phone]             Abiodun Recinos, DO Follow up in 1 week(s).    Specialty: Internal Medicine  Why: Post wards  Contact information:  [facility] [address]  [phone]               [facility] - Cardiology. Go in 1 day(s).    Specialty: Cardiology  Why: appointment at 10:00 am at [facility] cardiology on 7th floor tomorrow 5/20/2025  Contact information:  [facility] [address]  [facility] [address] [phone]                     Contact information for after-discharge care       Home Medical Care       [facility] .    Service: Home Health Services  Contact information:  [id number]  [facility] [address] [phone]                                     To address at follow-up:  -The following labs are to be drawn at the [facility] visit: BMP, mag, phos   -The following imaging studies are to be ordered at the post [facility] visit: NA     At this time, Ran Reyes Jr. is determined to have maximized benefits of IP hospitalization. he is discharged in stable condition with OP f/u recommendations and instructions. All questions answered, and patient verbalized agreement with the POC. They were given return precautions prior to d/c including symptoms that should prompt return to ED or to call PCP. Total time spent of DC of 35 minutes.       Marino Marquez DO  [employer], Internal Medicine, PGY-I          [1]   Patient Active Problem List  Diagnosis    Primary open angle glaucoma (POAG) of right eye, moderate stage    Combined forms of age-related cataract of left eye    Arteriosclerosis of coronary artery    Chronic atrial fibrillation    Dyslipidemia    Hypertension    Tobacco use    PVD (peripheral vascular disease)    VHD (valvular heart disease)    COVID-19    HFrEF (heart failure with reduced ejection fraction)    Nonrheumatic mitral valve regurgitation    Postoperative eye state    Scrotal hematoma    Chronic obstructive pulmonary disease, unspecified    Lesion of external ear    Low back pain    Other thrombophilia    Secondary hyperaldosteronism    Positive colorectal cancer screening using Cologuard test    Acute heart failure    History of COPD    CHF (congestive heart failure)    Acute cystitis with hematuria    Tobacco dependency

## 2025-05-19 NOTE — DISCHARGE INSTRUCTIONS
Discussed discharge medications with patient. [facility] health will follow patient at home.  Appointment with cardiology tomorrow ([place])  at 10am....IMPORTANT DO NOT MISS

## 2025-05-19 NOTE — PROGRESS NOTES
"   05/19/25 0846   Missed Time Reason   Missed Treatment Reason Patient unwilling to participate     Pt refused due to c/o of fatigue and " I need to let my food go down". Will attempt again as schedule permits  "

## 2025-05-20 VITALS
WEIGHT: 208 LBS | OXYGEN SATURATION: 95 % | SYSTOLIC BLOOD PRESSURE: 111 MMHG | RESPIRATION RATE: 20 BRPM | DIASTOLIC BLOOD PRESSURE: 73 MMHG | TEMPERATURE: 98 F | HEIGHT: 72 IN | HEART RATE: 80 BPM | BODY MASS INDEX: 28.17 KG/M2

## 2025-05-20 LAB
ANION GAP SERPL CALC-SCNC: 9 MEQ/L
BASOPHILS # BLD AUTO: 0.01 X10(3)/MCL
BASOPHILS NFR BLD AUTO: 0.1 %
BUN SERPL-MCNC: 29.7 MG/DL (ref 8.4–25.7)
CALCIUM SERPL-MCNC: 8.5 MG/DL (ref 8.8–10)
CHLORIDE SERPL-SCNC: 99 MMOL/L (ref 98–107)
CO2 SERPL-SCNC: 23 MMOL/L (ref 23–31)
CREAT SERPL-MCNC: 1.13 MG/DL (ref 0.72–1.25)
CREAT/UREA NIT SERPL: 26
EOSINOPHIL # BLD AUTO: 0 X10(3)/MCL (ref 0–0.9)
EOSINOPHIL NFR BLD AUTO: 0 %
ERYTHROCYTE [DISTWIDTH] IN BLOOD BY AUTOMATED COUNT: 25.9 % (ref 11.5–17)
GFR SERPLBLD CREATININE-BSD FMLA CKD-EPI: >60 ML/MIN/1.73/M2
GLUCOSE SERPL-MCNC: 195 MG/DL (ref 82–115)
HCT VFR BLD AUTO: 25.7 % (ref 42–52)
HGB BLD-MCNC: 8.2 G/DL (ref 14–18)
IMM GRANULOCYTES # BLD AUTO: 0.06 X10(3)/MCL (ref 0–0.04)
IMM GRANULOCYTES NFR BLD AUTO: 0.6 %
LYMPHOCYTES # BLD AUTO: 0.25 X10(3)/MCL (ref 0.6–4.6)
LYMPHOCYTES NFR BLD AUTO: 2.3 %
MAGNESIUM SERPL-MCNC: 1.9 MG/DL (ref 1.6–2.6)
MCH RBC QN AUTO: 24.6 PG (ref 27–31)
MCHC RBC AUTO-ENTMCNC: 31.9 G/DL (ref 33–36)
MCV RBC AUTO: 76.9 FL (ref 80–94)
MONOCYTES # BLD AUTO: 0.55 X10(3)/MCL (ref 0.1–1.3)
MONOCYTES NFR BLD AUTO: 5.1 %
NEUTROPHILS # BLD AUTO: 10.01 X10(3)/MCL (ref 2.1–9.2)
NEUTROPHILS NFR BLD AUTO: 91.9 %
NRBC BLD AUTO-RTO: 0 %
PHOSPHATE SERPL-MCNC: 2.1 MG/DL (ref 2.3–4.7)
PLATELET # BLD AUTO: 171 X10(3)/MCL (ref 130–400)
PLATELETS.RETICULATED NFR BLD AUTO: 3.3 % (ref 0.9–11.2)
PMV BLD AUTO: 10.7 FL (ref 7.4–10.4)
POCT GLUCOSE: 111 MG/DL (ref 70–110)
POCT GLUCOSE: 137 MG/DL (ref 70–110)
POTASSIUM SERPL-SCNC: 4.6 MMOL/L (ref 3.5–5.1)
RBC # BLD AUTO: 3.34 X10(6)/MCL (ref 4.7–6.1)
SODIUM SERPL-SCNC: 131 MMOL/L (ref 136–145)
WBC # BLD AUTO: 10.88 X10(3)/MCL (ref 4.5–11.5)

## 2025-05-20 PROCEDURE — 94761 N-INVAS EAR/PLS OXIMETRY MLT: CPT

## 2025-05-20 PROCEDURE — 94640 AIRWAY INHALATION TREATMENT: CPT

## 2025-05-20 PROCEDURE — 36415 COLL VENOUS BLD VENIPUNCTURE: CPT

## 2025-05-20 PROCEDURE — 83735 ASSAY OF MAGNESIUM: CPT

## 2025-05-20 PROCEDURE — 80048 BASIC METABOLIC PNL TOTAL CA: CPT

## 2025-05-20 PROCEDURE — 25000242 PHARM REV CODE 250 ALT 637 W/ HCPCS

## 2025-05-20 PROCEDURE — 25000003 PHARM REV CODE 250

## 2025-05-20 PROCEDURE — 63600175 PHARM REV CODE 636 W HCPCS

## 2025-05-20 PROCEDURE — 84100 ASSAY OF PHOSPHORUS: CPT

## 2025-05-20 PROCEDURE — 85025 COMPLETE CBC W/AUTO DIFF WBC: CPT

## 2025-05-20 RX ORDER — SODIUM,POTASSIUM PHOSPHATES 280-250MG
1 POWDER IN PACKET (EA) ORAL ONCE
Status: COMPLETED | OUTPATIENT
Start: 2025-05-20 | End: 2025-05-20

## 2025-05-20 RX ORDER — LANOLIN ALCOHOL/MO/W.PET/CERES
400 CREAM (GRAM) TOPICAL ONCE
Status: COMPLETED | OUTPATIENT
Start: 2025-05-20 | End: 2025-05-20

## 2025-05-20 RX ORDER — BUMETANIDE 1 MG/1
1 TABLET ORAL DAILY PRN
Qty: 30 TABLET | Refills: 2 | Status: ON HOLD | OUTPATIENT
Start: 2025-05-20 | End: 2026-05-20

## 2025-05-20 RX ADMIN — CLOPIDOGREL BISULFATE 75 MG: 75 TABLET, FILM COATED ORAL at 08:05

## 2025-05-20 RX ADMIN — PANTOPRAZOLE SODIUM 40 MG: 40 TABLET, DELAYED RELEASE ORAL at 08:05

## 2025-05-20 RX ADMIN — ATORVASTATIN CALCIUM 80 MG: 40 TABLET, FILM COATED ORAL at 08:05

## 2025-05-20 RX ADMIN — Medication 1000 UNITS: at 08:05

## 2025-05-20 RX ADMIN — POTASSIUM & SODIUM PHOSPHATES POWDER PACK 280-160-250 MG 1 PACKET: 280-160-250 PACK at 06:05

## 2025-05-20 RX ADMIN — BUMETANIDE 1 MG: 1 TABLET ORAL at 08:05

## 2025-05-20 RX ADMIN — IPRATROPIUM BROMIDE 0.5 MG: 0.5 SOLUTION RESPIRATORY (INHALATION) at 07:05

## 2025-05-20 RX ADMIN — FOLIC ACID 1 MG: 1 TABLET ORAL at 08:05

## 2025-05-20 RX ADMIN — SULFAMETHOXAZOLE AND TRIMETHOPRIM 1 TABLET: 800; 160 TABLET ORAL at 08:05

## 2025-05-20 RX ADMIN — FERROUS SULFATE TAB 325 MG (65 MG ELEMENTAL FE) 1 EACH: 325 (65 FE) TAB at 02:05

## 2025-05-20 RX ADMIN — MAGNESIUM OXIDE TAB 400 MG (241.3 MG ELEMENTAL MG) 400 MG: 400 (241.3 MG) TAB at 06:05

## 2025-05-20 RX ADMIN — PREDNISONE 20 MG: 20 TABLET ORAL at 08:05

## 2025-05-20 RX ADMIN — LEVALBUTEROL HYDROCHLORIDE 1.25 MG: 1.25 SOLUTION RESPIRATORY (INHALATION) at 07:05

## 2025-05-20 NOTE — DISCHARGE SUMMARY
[facility] Internal Medicine Discharge Summary    Admitting Physician: Lennox Vinson MD  Attending Physician: Trisha German MD  Date of Admit: 4/21/2025  Date of Discharge: 5/20/2025    Condition: Fair  Outcome: Patient tolerated treatment/procedure well without complication and is now ready for discharge.  DISPOSITION: Home with close follow up.         Discharge Diagnoses:     Problem List[1]    Principal Problem:  HFrEF (heart failure with reduced ejection fraction)    Consultants and Procedures:     Consultants:  IP CONSULT TO HOSPITAL MEDICINE  IP CONSULT TO SOCIAL WORK/CASE MANAGEMENT  IP CONSULT TO SOCIAL WORK/CASE MANAGEMENT  IP CONSULT TO CARDIOLOGY  [facility]  IP CONSULT TO SOCIAL WORK/CASE MANAGEMENT  IP CONSULT TO SOCIAL WORK/CASE MANAGEMENT  IP CONSULT TO PSYCHIATRY  IP CONSULT TO SOCIAL WORK/CASE MANAGEMENT    Procedures:   * No surgery found *      Brief Admission History:      Ran Reyes Jr. is a 69 y.o. 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 Patient reports he can only ambulate about 20-30 feet before having to stop due to claudication pain which is chronic and unchanged compared to baseline. Since running out of his diuretic medications approximately 5 days ago he has noted progressively worsening lower extremity swelling causing leg heaviness and weakness exacerbating difficulty ambulating.  He also notes acute on chronic cough productive of a brownish sputum  without hemoptysis.  Denies fever, chills, sweats.  Denies chest pain.  Endorses shortness of breath at rest and with ambulation but unchanged compared to baseline.  Denies abdominal pain, nausea, vomiting, constipation, diarrhea.  Denies increased or decreased urinary frequency, dysuria, or hematuria.  Sleeps with 2 pillows at night due to baseline orthopnea. Has not had to increase number of pillows or change sleeping habits. Wears 2L NC home oxygen at baseline for hx of COPD. Denies having increased oxygen requirements prior to ED presentation.     ED course:  Vital signs stable.  Oxygenation stable on 2 L nasal cannula.  CBC shows microcytic anemic (HGB 9.0; MCV 77).  CMP shows hyponatremia (Na 128), acidemia (CO2 14), elevated renal indices (BUN 25; creatinine 1.93), elevated alkaline phosphatase (). Troponin WNL.  BNP 26 39.5.  UDS negative.  EKG showed atrial fibrillation with PVCs.  Chest x-ray difficult to interpret due to overlying medical devices however appears to show cardiomegaly with bilateral pulmonary vascular congestion with question of bilateral pleural effusions. He was admitted for acute CHF exacerbation and cardiorenal syndrome.     Hospital Course with Pertinent Findings:      On 04/23/2025, patient was admitted to the ICU was started on pressors inotropes for cardiogenic shock/septic shock.  On 05/02/2025, patient was downgraded to .  On 05/03/2025, patient was Re upgraded to the ICU due to worsening lactate in agonal breathing.  He was placed on dobutamine infusion at that time.  On 05/13/2025, patient was weaned off of dobutamine.  The next day, patient was downgraded from the ICU.  Patient had difficulty tolerating diuretics as well as components of GDMT.  04/30/2025 transthoracic echo revealed a ejection fraction of 22%, grade 3 diastolic dysfunction, severely dilated left ventricle, severe global hypokinesis, moderately enlarged right ventricle, severely dilated atria.  PASP of  59 mm Hg.  Adjusted mean arterial pressure goal down to 60.  Even then, patient had trouble tolerating small doses of metoprolol.  Patient's blood pressure slightly improved towards the end of his stay with maps measuring in the 70s to even mid 80s.  At this time, patient is also continued on rivaroxaban.  Patient was also started on steroids due to noted adrenal insufficiency during his stay.  Patient is currently on prednisone tablet 20 mg b.i.d. on day of discharge.  Case was coordinated with Cardiology closely.  Agree that patient was unable to tolerate GDM T at this current time.  Patient is hypotensive office therapies at baseline and becomes symptomatic with attempts to start GDM T components.  Plan to continue life vest usage at this time, optimized for advanced heart failure evaluation outpatient had to lean: Dr. Jose Angel Kuo.  Continued on anticoagulation due to AFib and inability to tolerate beta-blockers.  Continued on SAPT and high-intensity statin.  Discussions were had with the patient regarding risks, benefits, goals.  Current plan is for patient to follow in Cardiology Clinic morning after discharge to optimize patient for evaluation of advanced heart failure in [place].  During our discussions, patient understood risks, benefits, alternatives and confirmed that plan is in alignment with his wishes and goals.Patient was not able to discharge 5/19 due to home issues. Stayed one more midnight     Discharge physical exam:  Vitals  BP: 111/73  Temp: 98.2 °F (36.8 °C)  Temp Source: Oral  Pulse: 80  Resp: 20  SpO2: 95 %  Height: 6' (182.9 cm)  Weight: 94.3 kg (208 lb)    Physical Exam  Constitutional:       General: He is not in acute distress.     Appearance: Normal appearance. He is not ill-appearing.   HENT:      Head: Normocephalic and atraumatic.   Eyes:      General: No scleral icterus.        Right eye: No discharge.         Left eye: No discharge.      Extraocular Movements: Extraocular  movements intact.      Conjunctiva/sclera: Conjunctivae normal.      Pupils: Pupils are equal, round, and reactive to light.   Cardiovascular:      Rate and Rhythm: Normal rate. Rhythm irregular.      Pulses: Normal pulses.      Heart sounds: Murmur heard.      No friction rub. No gallop.   Pulmonary:      Effort: Pulmonary effort is normal. No respiratory distress.      Breath sounds: Normal breath sounds. No stridor. No wheezing, rhonchi or rales.   Abdominal:      General: Abdomen is flat. Bowel sounds are normal. There is no distension.      Palpations: Abdomen is soft.      Tenderness: There is no abdominal tenderness. There is no guarding.   Musculoskeletal:         General: Swelling present. Normal range of motion.      Right lower leg: Edema present.      Left lower leg: Edema present.      Comments: 1+ pitting edema to BLE   Skin:     General: Skin is warm and dry.   Neurological:      General: No focal deficit present.      Mental Status: He is alert and oriented to person, place, and time.     TIME SPENT ON DISCHARGE: 35 minutes    Discharge Medications:         Medication List        PAUSE taking these medications      urea 40 % Crea  Wait to take this until your doctor or other care provider tells you to start again.  Commonly known as: CARMOL     varenicline tartrate 1 mg Tab  Wait to take this until your doctor or other care provider tells you to start again.  Commonly known as: CHANTIX            START taking these medications      albuterol 90 mcg/actuation inhaler  Commonly known as: PROVENTIL/VENTOLIN HFA  Inhale 2 puffs into the lungs every 6 (six) hours as needed for Shortness of Breath or Wheezing. Rescue  Replaces: ALBUTEROL INHL     bumetanide 1 MG tablet  Commonly known as: BUMEX  Take 1 tablet (1 mg total) by mouth daily as needed (Take every other day for heart failure. Take every day if needed for increasing fluid status.).     diphenhydrAMINE 25 mg capsule  Commonly known as:  BENADRYL  Take 1 capsule (25 mg total) by mouth every 6 (six) hours as needed for Itching.     predniSONE 5 MG tablet  Commonly known as: DELTASONE  Take 4 tablets (20 mg total) by mouth 2 (two) times daily for 7 days, THEN 3 tablets (15 mg total) 2 (two) times daily for 7 days, THEN 2 tablets (10 mg total) 2 (two) times daily for 7 days, THEN 1 tablet (5 mg total) 2 (two) times daily for 7 days.  Start taking on: May 19, 2025     QUEtiapine 100 MG Tab  Commonly known as: SEROQUEL  Take 1 tablet (100 mg total) by mouth every evening.     senna-docusate 8.6-50 mg per tablet  Commonly known as: PERICOLACE  Take 1 tablet by mouth 2 (two) times daily as needed for Constipation (Second line).     sulfamethoxazole-trimethoprim 800-160mg 800-160 mg Tab  Commonly known as: BACTRIM DS  Take 1 tablet by mouth 2 (two) times daily.            CHANGE how you take these medications      albuterol-ipratropium 2.5 mg-0.5 mg/3 mL nebulizer solution  Commonly known as: DUO-NEB  Take 3 mLs by nebulization every 6 (six) hours as needed for Shortness of Breath (For use when shortness of breath persists following inhaler usage). Rescue  What changed: reasons to take this     pantoprazole 40 MG tablet  Commonly known as: PROTONIX  Take 1 tablet (40 mg total) by mouth once daily.  What changed: when to take this            CONTINUE taking these medications      acetaminophen 325 mg Cap     atorvastatin 80 MG tablet  Commonly known as: LIPITOR  Take 1 tablet (80 mg total) by mouth once daily.     BREZTRI AEROSPHERE 160-9-4.8 mcg/actuation [facility]  Generic drug: budesonide-glycopyr-formoterol     clopidogreL 75 mg tablet  Commonly known as: PLAVIX  Take 1 tablet (75 mg total) by mouth once daily.     folic acid 1 MG tablet  Commonly known as: FOLVITE  Take 1 tablet (1 mg total) by mouth once daily.     gabapentin 300 MG capsule  Commonly known as: NEURONTIN     olopatadine 0.1 % ophthalmic solution  Commonly known as: PATANOL     potassium  chloride SA 20 MEQ tablet  Commonly known as: K-DUR,KLOR-CON  Take 1 tablet (20 mEq total) by mouth 2 (two) times daily.     rivaroxaban 20 mg Tab  Commonly known as: XARELTO  Take 1 tablet (20 mg total) by mouth Daily.     sertraline 100 MG tablet  Commonly known as: ZOLOFT     vitamin D 1000 units Tab  Commonly known as: VITAMIN D3            STOP taking these medications      ALBUTEROL INHL  Replaced by: albuterol 90 mcg/actuation inhaler     aspirin 81 MG EC tablet  Commonly known as: ECOTRIN     cetirizine 10 MG tablet  Commonly known as: ZYRTEC     furosemide 40 MG tablet  Commonly known as: LASIX     metoprolol succinate 25 MG 24 hr tablet  Commonly known as: TOPROL-XL     nicotine 14 mg/24 hr  Commonly known as: NICODERM CQ     nicotine 21 mg/24 hr  Commonly known as: NICODERM CQ     nitrofurantoin (macrocrystal-monohydrate) 100 MG capsule  Commonly known as: MACROBID     sacubitriL-valsartan 49-51 mg per tablet  Commonly known as: ENTRESTO               Where to Get Your Medications        These medications were sent to [address] [address]      Phone: [phone]   albuterol 90 mcg/actuation inhaler  albuterol-ipratropium 2.5 mg-0.5 mg/3 mL nebulizer solution  atorvastatin 80 MG tablet  bumetanide 1 MG tablet  clopidogreL 75 mg tablet  diphenhydrAMINE 25 mg capsule  pantoprazole 40 MG tablet  predniSONE 5 MG tablet  QUEtiapine 100 MG Tab  rivaroxaban 20 mg Tab  senna-docusate 8.6-50 mg per tablet  sulfamethoxazole-trimethoprim 800-160mg 800-160 mg Tab         Discharge Instructions:         Ran Reyes Jr. is being discharged Home or Self Care.    Schedule appointment with cards     Continue medications as directed during our discussions including:   -prednisone 20 mg twice a day for 7 days and taper afterwards by reducing prednisone dose by 5 mg every week.  Therefore, dose will be 20 mg twice a day for 7  "days, 15 mg twice a day for 7 days, 10 mg twice a day for 7 days, 5 mg twice a day for 7 days.  Patient may continue to discuss prednisone dosage with PCP and Endocrinology.    -bumetanide (Bumex) 1 mg every other day.  As instructed during our discussions, may appear as "as needed" but instructed to take 1 mg every other day.  If patient notices a weight gain of 5 lb or more of fluid weight during daily weigh ins, patient may increase dosing frequency to every day until fluid status is controlled.    -Bactrim 800-160 mg b.i.d. prescribed for PJP prophylaxis on prolonged steroid use.  If prednisone is continued at increase strength, patient may need extension of Bactrim course.    Discharge Procedure Orders   Ambulatory referral/consult to Internal Medicine   Standing Status: Future   Referral Priority: Routine Referral Type: Consultation   Referral Reason: Specialty Services Required   Requested Specialty: Internal Medicine   Number of Visits Requested: 1     Ambulatory referral/consult to Endocrinology   Standing Status: Future   Referral Priority: Urgent Referral Type: Consultation   Requested Specialty: Endocrinology   Number of Visits Requested: 1     Ambulatory referral/consult to Cardiology   Standing Status: Future   Referral Priority: Urgent Referral Type: Consultation   Referral Reason: Specialty Services Required   Requested Specialty: Cardiology   Number of Visits Requested: 1     Reason for not Ordering Smoking Cessation Referral     Order Specific Question Answer Comments   Reason for not ordering: Not medically appropriate at this time      Reason for not Prescribing Nicotine Replacement     Order Specific Question Answer Comments   Reason for not Prescribing: Not medically appropriate at this time         Follow-Up Appointments:   Contact information for follow-up providers       [facility] - Emergency Dept Follow up.    Specialty: Emergency Medicine  Why: If symptoms worsen  Contact " information:  [address] [facility] [address] [phone]             Abiodun Recinos DO Follow up in 1 week(s).    Specialty: Internal Medicine  Why: Post wards  Contact information:  [address] [facility] [id number] [phone]               [facility] - Cardiology. Go in 2 day(s).    Specialty: Cardiology  Why: schedule appointment with cardiology  Contact information:  [address] [facility] [address] [phone]                     Contact information for after-discharge care       Home Medical Care       [facility] .    Service: Home Health Services  Contact information:  [address] [facility] [address]  [facility] [address] [phone]                                     To address at follow-up:  -The following labs are to be drawn at the [facility] visit: BMP, mag, phos   -The following imaging studies are to be ordered at the [facility] visit: NA     At this time, Ran Reyes Jr. is determined to have maximized benefits of IP hospitalization. he is discharged in stable condition with OP f/u recommendations and instructions. All questions answered, and patient verbalized agreement with the POC. They were given return precautions prior to d/c including symptoms that should prompt return to ED or to call PCP. Total time spent of DC of 35 minutes.       Marino Marquez DO  [employer], Internal Medicine, PGY-I          [1]   Patient Active Problem List  Diagnosis    Primary open angle glaucoma (POAG) of right eye, moderate stage    Combined forms of age-related cataract of left eye    Arteriosclerosis of coronary artery    Chronic atrial fibrillation    Dyslipidemia    Hypertension    Tobacco use    PVD (peripheral vascular disease)    VHD (valvular heart disease)    COVID-19    HFrEF (heart failure with reduced ejection fraction)    Nonrheumatic mitral valve regurgitation    Postoperative eye state    Scrotal hematoma    Chronic  obstructive pulmonary disease, unspecified    Lesion of external ear    Low back pain    Other thrombophilia    Secondary hyperaldosteronism    Positive colorectal cancer screening using Cologuard test    Acute heart failure    History of COPD    CHF (congestive heart failure)    Acute cystitis with hematuria    Tobacco dependency

## 2025-05-20 NOTE — PROGRESS NOTES
Contacted pt's niece/POA, Tiffanie Sweeney ([phone]) re pt stating he has no key to access his house. Niece out of state at a family , but stated she will get in touch with pt's sister, Yesenia Reyes ([phone]) who lives near pt. CM attempted to reach sister; however, no answer/mailbox full.

## 2025-05-20 NOTE — PT/OT/SLP DISCHARGE
Physical Therapy Discharge Summary    Name: Ran Reyes Jr.  MRN: [id number]   Principal Problem: HFrEF (heart failure with reduced ejection fraction)   1. HFrEF (heart failure with reduced ejection fraction)    2. Weakness    3. Dyspnea    4. Screening due    5. ELIZABETH (acute kidney injury)    6. Acidemia    7. Microcytic anemia    8. Tobacco dependency    9. Acute on chronic combined systolic and diastolic congestive heart failure    10. Bradycardia    11. QT prolongation    12. Heart failure with reduced ejection fraction    13. Heart failure    14. At risk for long QT syndrome    15. Chest pain    16. Acute combined systolic and diastolic congestive heart failure    17. Nonrheumatic mitral valve regurgitation    18. Syncope    19. Chronic congestive heart failure, unspecified heart failure type    20. Adrenal insufficiency    21. PVD (peripheral vascular disease)       Problem List[1]   Recommendations - per last treatment session     Therapy Intensity Recommendations at Discharge: Low Intensity Therapy  Discharge Equipment Recommendations: none     Assessment:     Patient last seen by PT SERVICES on 2025.  Pt. Refused PT on , however, observed pt. Ambulating in the hallway with rollator independently.    Hospital discharge anticipated today.  PT Services not warranted at this time.  Patient does not require further acute PT services.   Patient's current level of function is at baseline (PLOF).  Patient has maximized benefit from therapy.    Objective     GOALS: - 3 of 3 STG's met by patient  Multidisciplinary Problems       Physical Therapy Goals       Not on file              Multidisciplinary Problems (Resolved)          Problem: Physical Therapy    Goal Priority Disciplines Outcome Interventions   Physical Therapy Goal   (Resolved)     PT, PT/OT Met    Description: REVIEWED 2025  Goals to be met by: DISCHARGE  Patient will increase functional independence with mobility by performin.  Sit <=> stand w/ RW at [place]. - MET 05/18/2025  2. Bed <=> chair w/ RW at [place]. - MET 05/18/2025  3. Ambulate 130' w/ RW at [place]. - MET 05/20/2025-PT observed pt. Ambulating in the hallway                     Plan     Patient discharged from acute PT Services on 5/20/2025.    Patient Discharged to: home or self care per chart.    5/20/2025       [1]   Patient Active Problem List  Diagnosis    Primary open angle glaucoma (POAG) of right eye, moderate stage    Combined forms of age-related cataract of left eye    Arteriosclerosis of coronary artery    Chronic atrial fibrillation    Dyslipidemia    Hypertension    Tobacco use    PVD (peripheral vascular disease)    VHD (valvular heart disease)    COVID-19    HFrEF (heart failure with reduced ejection fraction)    Nonrheumatic mitral valve regurgitation    Postoperative eye state    Scrotal hematoma    Chronic obstructive pulmonary disease, unspecified    Lesion of external ear    Low back pain    Other thrombophilia    Secondary hyperaldosteronism    Positive colorectal cancer screening using Cologuard test    Acute heart failure    History of COPD    CHF (congestive heart failure)    Acute cystitis with hematuria    Tobacco dependency

## 2025-05-20 NOTE — PT/OT/SLP DISCHARGE
Occupational Therapy Discharge Summary    Ran Reyes Jr.  MRN: [id number]   Principal Problem: HFrEF (heart failure with reduced ejection fraction)      Patient Discharged from acute Occupational Therapy on 5/20/25.  Please refer to prior OT note dated 5/15/25 for functional status.    Assessment:      Information required to complete an accurate discharge summary is unknown due to pt refusal to participate in POC since 5/15/25.  Refer to therapy initial evaluation and last progress note for initial and most recent functional status and goal achievement.  Recommendations made may be found in medical record.    Objective:     GOALS:   Multidisciplinary Problems       Occupational Therapy Goals          Problem: Occupational Therapy    Goal Priority Disciplines Outcome Interventions   Occupational Therapy Goal     OT, PT/OT Unknown status at time of d/c due to ongoing refusals     Description: Pt will ambulate to bathroom with rollator SBA  Pt will complete toilet t/f SBA  Pt will complete toileting tasks SBA  Pt will complete UE/LE dressing mod I  Pt will increase standing endurance x 5 minutes.                        Reasons for Discontinuation of Therapy Services  Pt with planned d/c for today       Plan:     Patient Discharged to: per chart, home or self care     5/20/2025

## 2025-05-20 NOTE — PROGRESS NOTES
[facility] Med Surg Telemetry  Progress Note    Patient Name: Ran Reyes Jr.  MRN: [id number]  Admission Date: 4/21/2025  Hospital Length of Stay: 28 days  Code Status: Full Code  Attending Provider: Trisha German MD  Primary Care Provider: Abiodun Recinos DO     Subjective:     HPI:   Ran Reyes Jr. is a 69 y.o. male with PMH of tobacco dependence, chronic obstructive pulmonary disease, hypertension, pulmonary hypertension, hyperlipidemia, chronic persistent atrial fibrillation on Xarelto, nonobstructive coronary artery disease, nonischemic cardiomyopathy, heart failure with with reduced ejection fraction (EF 30%), peripheral vascular disease s/p stenting to superficial femoral artery and right tibial artery, renée's gangrene (03/2024), primary open-angle glaucoma, who presented to [facility] ED (4/21/2025) due to generalized fatigue and weakness x 3-5 days. Patient reports he can only ambulate about 20-30 feet before having to stop due to claudication pain which is chronic and unchanged compared to baseline. Since running out of his diuretic medications approximately 5 days ago he has noted progressively worsening lower extremity swelling causing leg heaviness and weakness exacerbating difficulty ambulating.  He also notes acute on chronic cough productive of a brownish sputum without hemoptysis.  Denies fever, chills, sweats.  Denies chest pain.  Endorses shortness of breath at rest and with ambulation but unchanged compared to baseline.  Denies abdominal pain, nausea, vomiting, constipation, diarrhea.  Denies increased or decreased urinary frequency, dysuria, or hematuria.  Sleeps with 2 pillows at night due to baseline orthopnea. Has not had to increase number of pillows or change sleeping habits. Wears 2L NC home oxygen at baseline for hx of COPD. Denies having increased oxygen requirements prior to ED presentation.     ED course:  Vital signs stable.  Oxygenation stable on 2 L  nasal cannula.  CBC shows microcytic anemic (HGB 9.0; MCV 77).  CMP shows hyponatremia (Na 128), acidemia (CO2 14), elevated renal indices (BUN 25; creatinine 1.93), elevated alkaline phosphatase (). Troponin WNL.  BNP 26 39.5.  UDS negative.  EKG showed atrial fibrillation with PVCs.  Chest x-ray difficult to interpret due to overlying medical devices however appears to show cardiomegaly with bilateral pulmonary vascular congestion with question of bilateral pleural effusions. He was admitted for acute CHF exacerbation and cardiorenal syndrome.      Hospital Course/Significant events:  4/23/25: Admit to ICU, started on pressors and inotropes for cardiogenic / septic shock   5/2/25: Downgraded to    5/3/25: Re-upgraded to ICU 2/2 worsening lactate and agonal breathing  5/13/25: Weaned off dobutamine  05/14/2025: Downgraded from ICU.  05/19/2025:  Patient was discharged.  Patient was unable coordinate transportation.      24 Hour Interval History:  NAEON. VSS. Patient continues to endorse orthopnea and mild leg swelling which patient states are at his baseline. 5/18/2025 UOP 2600. Vitals show patient BP at goal relative to patient EF. Patient was discharged but patient was not able to facilitate discharging home and was hesitant about alternatives.     Past Medical History:   Diagnosis Date    A-fib     Anticoagulant long-term use     Anxiety     Aortic aneurysm     Cataract     CHF (congestive heart failure)     Chronic atrial fibrillation     COPD (chronic obstructive pulmonary disease)     Coronary artery disease     Depression     HLD (hyperlipidemia)     Hypertension     Mitral regurgitation     PAD (peripheral artery disease)     Primary open angle glaucoma (POAG)        Past Surgical History:   Procedure Laterality Date    ANGIOGRAM, CORONARY, WITH LEFT HEART CATHETERIZATION N/A 03/26/2024    Procedure: Angiogram, Coronary, with Left Heart Cath;  Surgeon: Adriano Montiel MD;  Location: [facility] CATH LAB;   Service: Cardiology;  Laterality: N/A;    ATHERECTOMY OF PERIPHERAL VESSEL Left 09/12/2022    LEFT SFA ATHERECTOMY, BALLOON ANGIOPLASTY    CATARACT EXTRACTION W/  INTRAOCULAR LENS IMPLANT Left 03/09/2023    Procedure: EXTRACTION, CATARACT, WITH IOL INSERTION;  Surgeon: Georgi Borja MD;  Location: [facility];  Service: Ophthalmology;  Laterality: Left;  19.5  mac    EGD, WITH CLOSED BIOPSY  03/22/2024    Procedure: EGD, WITH CLOSED BIOPSY;  Surgeon: Ronald Calderon MD;  Location: [facility] ENDOSCOPY;  Service: Gastroenterology;;    ESOPHAGOGASTRODUODENOSCOPY N/A 03/22/2024    Procedure: EGD;  Surgeon: Ronadl Calderon MD;  Location: [facility] ENDOSCOPY;  Service: Gastroenterology;  Laterality: N/A;    Heart Stent N/A     > 10yrs. AGO    INCISION AND DRAINAGE N/A 03/18/2024    Procedure: Incision and Drainage;  Surgeon: Fahad Rivas MD;  Location: [facility];  Service: Urology;  Laterality: N/A;  I&D SCROTAL ABSCESS    INSERTION OF STENT INTO PERIPHERAL VESSEL Right 10/17/2022    RIGHT SFA ATHERECTOMY, BALLOON ANGIOPLASTY, STENT; RIGHT ANTERIOR TIBIAL ARTERY ATHERECTOMY, BALLOON ANGIOPLASTY    ORCHIECTOMY Left 03/18/2024    Procedure: ORCHIECTOMY;  Surgeon: Fahad Rivas MD;  Location: [facility];  Service: Urology;  Laterality: Left;       Social History[1]    Current Outpatient Medications   Medication Instructions    acetaminophen 650 mg, 2 times daily PRN    albuterol (PROVENTIL/VENTOLIN HFA) 90 mcg/actuation inhaler 2 puffs, Inhalation, Every 6 hours PRN, Rescue    albuterol-ipratropium (DUO-NEB) 2.5 mg-0.5 mg/3 mL nebulizer solution 3 mLs, Nebulization, Every 6 hours PRN, Rescue    atorvastatin (LIPITOR) 80 mg, Oral, Daily    BREZTRI AEROSPHERE 160-9-4.8 mcg/actuation HFAA 2 puffs, 2 times daily    bumetanide (BUMEX) 1 mg, Oral, Daily PRN, Take 1 tablet every other day scheduled. Take 1 tablet on in between days as needed if you have weight gain of 5 pounds or more.    clopidogreL  (PLAVIX) 75 mg, Oral, Daily    diphenhydrAMINE (BENADRYL) 25 mg, Oral, Every 6 hours PRN    folic acid (FOLVITE) 1 mg, Oral, Daily    gabapentin (NEURONTIN) 300 mg, Oral, 3 times daily    olopatadine (PATANOL) 0.1 % ophthalmic solution Apply to eye.    pantoprazole (PROTONIX) 40 mg, Oral, Daily    potassium chloride SA (K-DUR,KLOR-CON) 20 MEQ tablet 20 mEq, Oral, 2 times daily    predniSONE (DELTASONE) 5 MG tablet Take 4 tablets (20 mg total) by mouth 2 (two) times daily for 7 days, THEN 3 tablets (15 mg total) 2 (two) times daily for 7 days, THEN 2 tablets (10 mg total) 2 (two) times daily for 7 days, THEN 1 tablet (5 mg total) 2 (two) times daily for 7 days.    QUEtiapine (SEROQUEL) 100 mg, Oral, Nightly    rivaroxaban (XARELTO) 20 mg, Oral, Daily    senna-docusate (PERICOLACE) 8.6-50 mg per tablet 1 tablet, Oral, 2 times daily PRN    sertraline (ZOLOFT) 100 mg, Oral, Daily    sulfamethoxazole-trimethoprim 800-160mg (BACTRIM DS) 800-160 mg Tab 1 tablet, Oral, 2 times daily    urea (CARMOL) 40 % Crea Apply topically.    varenicline tartrate (CHANTIX) 0.5 mg    vitamin D (VITAMIN D3) 1,000 Units, Daily     Current Inpatient Medications   atorvastatin  80 mg Oral Daily    bumetanide  1 mg Oral Daily    clopidogreL  75 mg Oral Daily    ferrous sulfate  1 tablet Oral Q48H    folic acid  1 mg Oral Daily    ipratropium  0.5 mg Nebulization Q12H    levalbuterol  1.25 mg Nebulization Q12H    miconazole NITRATE 2 %   Topical (Top) BID    pantoprazole  40 mg Oral Daily    predniSONE  20 mg Oral BID    QUEtiapine  100 mg Oral QHS    rivaroxaban  20 mg Oral Daily    sulfamethoxazole-trimethoprim 800-160mg  1 tablet Oral BID    vitamin D  1,000 Units Oral Daily     Current Intravenous Infusions   0.9% NaCl   Intravenous Continuous   Stopped at 05/04/25 8690     ROS:   Review of Systems   Constitutional:  Negative for chills, diaphoresis, fever and malaise/fatigue.   HENT:  Negative for sore throat and tinnitus.    Eyes:   Negative for pain, discharge and redness.   Respiratory:  Positive for shortness of breath (with ambulation). Negative for cough, hemoptysis, sputum production and wheezing.    Cardiovascular:  Positive for orthopnea and leg swelling. Negative for chest pain, palpitations and claudication.   Gastrointestinal:  Negative for abdominal pain, blood in stool, constipation, diarrhea, melena, nausea and vomiting.   Genitourinary:  Negative for dysuria, flank pain, frequency, hematuria and urgency.   Musculoskeletal:  Negative for joint pain and myalgias.   Neurological:  Negative for weakness and headaches.           Objective:     Intake/Output Summary (Last 24 hours) at 5/20/2025 0609  Last data filed at 5/20/2025 0418  Gross per 24 hour   Intake 1447 ml   Output 2500 ml   Net -1053 ml     Vital Signs (Most Recent):  Temp: 97.7 °F (36.5 °C) (05/20/25 0404)  Pulse: 71 (05/20/25 0404)  Resp: 20 (05/20/25 0404)  BP: 111/76 (05/20/25 0404)  SpO2: 100 % (05/20/25 0404)  Body mass index is 28.21 kg/m².  Weight: 94.3 kg (208 lb) Vital Signs (24h Range):  Temp:  [97.4 °F (36.3 °C)-98.8 °F (37.1 °C)] 97.7 °F (36.5 °C)  Pulse:  [] 71  Resp:  [18-20] 20  SpO2:  [96 %-100 %] 100 %  BP: ()/(63-76) 111/76     Physical Exam:  Physical Exam  Constitutional:       General: He is not in acute distress.     Appearance: Normal appearance. He is not ill-appearing.   HENT:      Head: Normocephalic and atraumatic.   Eyes:      General: No scleral icterus.        Right eye: No discharge.         Left eye: No discharge.      Extraocular Movements: Extraocular movements intact.      Conjunctiva/sclera: Conjunctivae normal.      Pupils: Pupils are equal, round, and reactive to light.   Cardiovascular:      Rate and Rhythm: Normal rate. Rhythm irregular.      Pulses: Normal pulses.      Heart sounds: Murmur heard.      No friction rub. No gallop.   Pulmonary:      Effort: Pulmonary effort is normal. No respiratory distress.      Breath  "sounds: Normal breath sounds. No stridor. No wheezing, rhonchi or rales.   Abdominal:      General: Abdomen is flat. Bowel sounds are normal. There is no distension.      Palpations: Abdomen is soft.      Tenderness: There is no abdominal tenderness. There is no guarding.   Musculoskeletal:         General: Swelling present. Normal range of motion.      Right lower leg: Edema present.      Left lower leg: Edema present.      Comments: 1+ pitting edema to BLE   Skin:     General: Skin is warm and dry.   Neurological:      General: No focal deficit present.      Mental Status: He is alert and oriented to person, place, and time.           Lines/Drains/Airways       Peripheral Intravenous Line  Duration                  Peripheral IV - Single Lumen 05/15/25 0505 20 G 2 1/4 in Yes Anterior;Left Forearm 5 days                  Significant Labs:  Lab Results   Component Value Date    WBC 10.88 05/20/2025    HGB 8.2 (L) 05/20/2025    HCT 25.7 (L) 05/20/2025    MCV 76.9 (L) 05/20/2025     05/20/2025       BMP  Lab Results   Component Value Date     (L) 05/20/2025    K 4.6 05/20/2025    CO2 23 05/20/2025    BUN 29.7 (H) 05/20/2025    CREATININE 1.13 05/20/2025    CALCIUM 8.5 (L) 05/20/2025    AGAP 9.0 05/20/2025    EGFRNONAA 88 (L) 12/06/2021     ABG  No results for input(s): "PH", "PO2", "PCO2", "HCO3", "BE" in the last 168 hours.    Mechanical Ventilation Support:  Oxygen Concentration (%): 28 (05/19/25 0607)    Significant Imaging:  I have reviewed the pertinent imaging within the past 24 hours.    Assessment/Plan:     Cardiogenic Shock   NICM-HFrEF with pulmonary hypertension   NOCAD, NICMO  Hx of VT arrest (on life vest)  Chronic persistent A-fib   Cardiorenal syndrome (improved)  Prolonged QT  Electrolyte abnormalities  -cardiology following; appreciate assistance   -EKG (05.14.25) showed persistent afib  -TTE (05/09/25) showed 22% with PASP 59 mm Hg and grade 3 diastolic dysfunction  -LH (03/2024) showed " nonobstructive coronary artery disease  -patient has a history of not being able to tolerate GDM T in attempts to start beta-blocker have not been successful due to patient not being able to tolerate  -will diurese with bumex 1 mg every other day due to electrolyte abnormalities with daily diuretic  -cardiology to refer patient to advanced HF specialist in [place] for evaluation for LVAD versus heart transplant  -continue anticoagulation for CVA risk reduction in setting of atrial fibrillation.   -continue SAPT and high-intensity statin therapy  -strict intake and output  -daily standing weight  -maintain K>4, phos >3, mag>2    Adrenal Insufficiency   -low cortisol was noted on cord stem test and normal past ACTH level  -consider MRI brain inpatient versus outpatient.    -previously on hydrocortisone 50 mg Q 8 hours  -performed hydrocotrisone to prednisone conversation calculation which recommended 37.5 mg qd of prednisone  -now performing oral steroid taper  -prednisone 20 mg bid x 1 week followed by 20 mg daily x 1 week followed by 10 mg bid x 1 week followed by 10 mg daily x 1 week  -will refer to [facility] as outpatient for repeat evaluation of adrenal insufficiency    COPD  -patient's respiratory status is stable at this time.    -continue ipratropium nebulizer solution 0.5 mg Q 8 hours    Groin Itch   -miconazole topical powder   -PO diphenhydramine 25 mg prn itching    Anxiety/insomnia   -patient was evaluated for level 2 screening for placement.    -diagnosis per psychiatry was unspecified depressive disorder.  Not requiring PEC.    Recommended continuing Quetiapine 100 mg and trazodone 25 mg p.o. nightly p.r.n. for insomnia.    Dispo: Patient was discharged with medicines delivered to bed and plan for close outpatient follow up. Discharge was rescinded after hours. Plan for discharge 5/20/2025     Marino Marquez DO  [facility] Internal Medicine PGY-1          [1]   Social  History  Socioeconomic History    Marital status: Single   Tobacco Use    Smoking status: Every Day     Current packs/day: 0.50     Average packs/day: 0.8 packs/day for 50.4 years (40.9 ttl pk-yrs)     Types: Cigarettes     Start date: 1975     Passive exposure: Current    Smokeless tobacco: Never    Tobacco comments:     States smoke 2-3 cigarettes per day   Substance and Sexual Activity    Alcohol use: Yes     Alcohol/week: 3.0 standard drinks of alcohol     Types: 3 Cans of beer per week     Comment: socially    Drug use: Not Currently     Frequency: 1.0 times per week     Types: Marijuana     Comment: no use in over 1 year    Sexual activity: Not Currently     Partners: Female     Social Drivers of Health     Financial Resource Strain: Low Risk  (4/23/2025)    Overall Financial Resource Strain (CARDIA)     Difficulty of Paying Living Expenses: Not hard at all   Food Insecurity: No Food Insecurity (4/23/2025)    Hunger Vital Sign     Worried About Running Out of Food in the Last Year: Never true     Ran Out of Food in the Last Year: Never true   Transportation Needs: No Transportation Needs (4/23/2025)    PRAPARE - Transportation     Lack of Transportation (Medical): No     Lack of Transportation (Non-Medical): No   Physical Activity: Inactive (12/17/2024)    Exercise Vital Sign     Days of Exercise per Week: 0 days     Minutes of Exercise per Session: 0 min   Stress: No Stress Concern Present (4/23/2025)    [employer] [place] [employer] - Occupational Stress Questionnaire     Feeling of Stress : Not at all   Housing Stability: Low Risk  (4/23/2025)    Housing Stability Vital Sign     Unable to Pay for Housing in the Last Year: No     Homeless in the Last Year: No

## 2025-05-20 NOTE — PROGRESS NOTES
Contacted by Jada caballero ([phone]) who stated pt's sister currently at pt's home with his house key - okay to transport home.

## 2025-05-20 NOTE — PROGRESS NOTES
Return call from ada/CHRISTINE, Tiffanie Sweeney, who stated pt's sister is on her way to  pt's house [place] from [place] and will place in pt's front door or BBQ pit within the hour.

## 2025-05-20 NOTE — NURSING
Neurology History & Physical     ASSESSMENT & PLAN:      ICD-10-CM    1. Cerebrovascular accident (CVA), unspecified mechanism (HCC)  I63.9 [facility]iac Telemetry      2. Abnormal brain MRI  R90.89       3. Memory loss  R41.3 PSYCHOLOGY - INTERNAL     Folic Acid Serum (Folate)     Vitamin B12 with Reflex to MMA        Multifocal lesions on brain MRI in 2021, that persisted from March to April MRI's with new non-enhancing and persistent enhancing lesions on the April scan.   He most likely had multifocal embolic strokes (possibly PRES, less likely demyelinating disease in 2021).  To me, some lesions seem to involve the grey matter, but many are juxta cortical, ovoid, or jose/intracallosal.  He is young but a traditional vasculopath - he does have very longstanding HTN, former smoker, as well as uncontrolled RAPHAEL.    Hypercoag eval unremarkable.  MRI brain still shows persistent lesions, though seems improved to me, and no clear new lesions.  Heart monitor not done, will re-order.  Sleep eval pending in Sept 2024.  Continue ASA, statin, BP meds (and aggressive vascular risk factor mgmt per PCP).  He is planning to see sleep specialist soon (AHI 27 in 2021).  He is working on diet and exercise too.    Memory loss, obtain  B12, Folate, and neuropsych testing.    We discussed symptoms that would warrant urgent/emergent evaluation. Patient verbalized understanding and agreement.    Total time I spent caring for the patient was 45 minutes on the day of the encounter related to this visit, including chart review and documentation before and after the visit, including time spent during the visit, but not including separately reported activities or procedures.    Return in about 6 months (around 2/20/2025).     ~~~~~~~~~~~~~~~~~~~~~~~~~    CHIEF COMPLAINT / REASON FOR VISIT:    Chief Complaint   Patient presents with    CVA     Patient is here with his wife today  Patient stated he is following up on a MRI he had done.   Pt having difficulty contacting a family member to pick him up at this time. Pt appears in no distress and denies any complaints; VS and CBG obtained as charted. IV and heart monitor still intact at this time. Pt verbalized will update nurse on transportation.     Update @ 2305: Pt unable to obtain transportation at this time, pt verbalized to nurse that he may have to stay due to not having personal belongings such as clothes and keys to house. MD on-call notified to rescind discharge order.        HISTORY OBTAINED FROM:  Patient and others as above  Data review (see below)    HISTORY OF PRESENT ILLNESS:  Tonio Wyatt is a 44 year old male with HTN, HL, RAPHAEL, obesity, pre-DM (A1c 5.9 in July 2023), who was admitted here in March 2021 for vomiting, dizziness, imbalance, uncontrolled HTN.  MRI concerning for PRES vs. Multiple subacute infarcts (vs. MS once he saw neuro in follow up).  Also had left hand incoordination (and the most \"PRES-like\" lesion was left cerebellar).  Symptoms improved with BP control.  He also had further eval as outpatient with neuro (LP negative as below).      He had an event of LOC twice in past years, last one about 2-4 months ago.  Both times, he laughed so hard and he had tunnel vision, passed out, slouched done, and then he recovered immediately without confusion.    No new stroke like symptoms since then.  Has residual word finding difficulty and STM difficulty.  Right hand incoordination previously recovered.  He is still working but needs to keep notes for everything.    INTERIM HISTORY:  No new neuro complaints, other than he does feel memory is worse slightly as compared to 3-4 years ago (did develop at the time of the problems in 2021).  He has had to make lists to avoid missing calls and appts (at home and at work).    DATA REVIEWED:  As documented in the HPI    Aug 2024  PT gene mutation, FVL, LAC/APLA, Ptn C/S unremarkable    MRI brain   \"1. No acute intracranial abnormality identified.      2. Signal changes in the supra and infratentorial white matter as described above are concerning for demyelinating disease, with other etiologies not entirely excluded.  Clinical correlation and continued follow-up is suggested.  No enhancing plaque to suggest active demyelination.  No significant interval brain parenchymal volume loss.      3. Patchy gradient susceptibility in the right parietal lobe likely represents an area of mineralization or chronic hemorrhage.  Punctate  foci of gradient susceptibility in the jose likely represent areas of chronic microhemorrhage and/or focal mineralization. \"  (I independently analyzed and interpreted this, and I agree with the reading physician report(s).)    June 2024  Cards note  A1c 5.6  TSH unremarkable     April 2024  TTE unremarkable     Feb 2024  PCP note     May-June 2021  CSF negative but OCBs not done   Neuro note (Genny) was evaluated for dizziness and difficulty with left hand, MRI brain was concerning for MS, CTA h/n unremarkable, recommended LP and ASA + Statin    April 2021  CTA h/n unremarkable  TTE w bubble unremarkable   MRI brain w/wo \"When compared to the brain MRI from 3/12/2021, there is a new punctate focus of restriction abnormality and T2 hyperintense signal within the posterior left corona radiata.  There is an additional new small T2 hyperintense lesion within the left vermis.  These new lesions do not demonstrate enhancement.  The prominent cortical and subcortical lesion within the right parietal lobe demonstrates persistent but improved enhancement.  The T2 hyperintense lesion within the right splenium   demonstrates new intrinsic T2 hypointense signal.  The other T2 hyperintense lesions within the supratentorial and infratentorial compartments are otherwise not significantly changed.  Many of these lesions demonstrate T2 shine through on diffusion-weighted imaging.  There are punctate foci of T2 hyperintense signal and gradient susceptibility within the jose.  Differential considerations for these lesions would include a demyelinating process such as MS or scattered subacute infarcts of   varying age.  The progression of disease despite treatment would make posterior reversible encephalopathy less likely.  Some of these lesions are perpendicular to the corpus callosum and clinical correlation is recommended to assess for a demyelinating process.  Continued clinical and imaging follow-up is recommended. \"   (I  independently analyzed and interpreted this, and I agree with the reading physician's report.)    PHYSICAL EXAMINATION:  /88   Pulse 71   Resp 16   Ht 70\"   Wt 261 lb (118.4 kg)   SpO2 98%   BMI 37.45 kg/m²     Gen: in NAD  MSE: nl attn/conc, nl language, nl fund of knowledge  CN: EOMI, VFF, nl facial mvmt, nl palate, nl tongue  Motor: 5/5 x4  DTR: 2+ BUE and BLE  Coord: nl FTN b/I  Gait: normal      NEUROLOGICAL FAMILY HISTORY:  Cousin MS  No strokes    PAST MEDICAL HISTORY:  Past Medical History:    Acute, but ill-defined, cerebrovascular disease    MALU (acute kidney injury) ([facility])    Chest pain    Class 2 severe obesity due to excess calories with serious comorbidity and body mass index (BMI) of 37.0 to 37.9 in adult ([facility])    Class 2 severe obesity due to excess calories with serious comorbidity and body mass index (BMI) of 37.0 to 37.9 in adult ([facility])    Associated with resistant hypertension, mixed hyperlipidemia, RAPHAEL, and history of CVA       Class 2 severe obesity due to excess calories with serious comorbidity and body mass index (BMI) of 38.0 to 38.9 in adult ([facility])    Associate with hypertension, prediabetes, mixed hyperlipidemia, and RAPHAEL    Class 2 severe obesity due to excess calories with serious comorbidity and body mass index (BMI) of 39.0 to 39.9 in adult ([facility])    Decorative tattoo    Disorder of liver    fatty liver    Dizziness    Dyslipidemia    Goal LDL <70     Easy bruising    Essential hypertension, benign    Goal BP <=130/85     Fatigue    Flatulence/gas pain/belching    Focal infarction of brain ([facility])    Food intolerance    Frequent urination    Heartburn    History of ear infection    Hypertensive emergency    Indigestion    Leg swelling    Low HDL (under 40)    Mixed hyperlipidemia    Goal LDL <70     Resistant hypertension    Goal BP <=130/85     Shortness of breath    Sleep apnea    Sleep disturbance    Stress    Syncope    Visual impairment    glasses    Wears glasses    Weight  gain    17 pound weight gain from 3/24/2021 to 10/1/2021       PAST SURGICAL HISTORY:  Past Surgical History:   Procedure Laterality Date    Vasectomy  12/4/14    Dr. Horta       SOCIAL HISTORY:  Social History     Socioeconomic History    Marital status:    Tobacco Use    Smoking status: Former     Current packs/day: 0.00     Average packs/day: 1.5 packs/day for 14.0 years (21.0 ttl pk-yrs)     Types: Cigarettes     Quit date: 7/1/2014     Years since quitting: 10.1    Smokeless tobacco: Current    Tobacco comments:     pt smoked for 15 years   Vaping Use    Vaping status: Former   Substance and Sexual Activity    Alcohol use: Yes     Alcohol/week: 3.0 standard drinks of alcohol     Types: 3 Standard drinks or equivalent per week     Comment: social on weekends    Drug use: Yes     Types: Cannabis     Comment: Gummies    Sexual activity: Not Currently   Other Topics Concern    Caffeine Concern Yes     Comment: 1 cup coffee    Exercise Yes     Comment: very active at work.     Seat Belt Yes       ALLERGIES:  No Known Allergies    CURRENT MEDICATIONS:   amLODIPine 10 MG Oral Tab Take 1 tablet (10 mg total) by mouth nightly. 90 tablet 3    atorvastatin 80 MG Oral Tab Take 1 tablet (80 mg total) by mouth nightly. 90 tablet 3    carvedilol 25 MG Oral Tab Take 1 tablet (25 mg total) by mouth 2 (two) times daily with meals. 180 tablet 3    hydroCHLOROthiazide 50 MG Oral Tab Take 1 tablet (50 mg total) by mouth daily. 90 tablet 3    Olmesartan Medoxomil 40 MG Oral Tab Take 1 tablet (40 mg total) by mouth at bedtime. 90 tablet 3    ASPIRIN 325 MG Oral Tab TAKE ONE TABLET BY MOUTH ONE TIME DAILY  90 tablet 0    Cetirizine HCl (ZYRTEC OR) Take by mouth daily.         Leobardo Jurado MD, FAES, FAAN  Board-Certified in Neurology, Epilepsy, and Clinical Neurophysiology  [facility]

## 2025-05-21 ENCOUNTER — PATIENT OUTREACH (OUTPATIENT)
Dept: ADMINISTRATIVE | Facility: CLINIC | Age: 70
End: 2025-05-21
Payer: MEDICARE

## 2025-05-21 NOTE — PROGRESS NOTES
C3 nurse attempted to contact Ran Reyes Jr. for a TCC post hospital discharge follow up call. No answer. VM full.    The patient does not have a scheduled HOSFU appointment. Message sent to PCP's staff to assist with HOSFU appointment scheduling.

## 2025-05-22 NOTE — PROGRESS NOTES
C3 nurse attempted to contact patient for a TCC post hospital discharge follow-up call. The patient sister declined call at this time.

## 2025-05-23 ENCOUNTER — HOSPITAL ENCOUNTER (INPATIENT)
Facility: HOSPITAL | Age: 70
LOS: 12 days | Discharge: ANOTHER HOSPITAL | DRG: 682 | End: 2025-06-05
Attending: INTERNAL MEDICINE
Payer: MEDICARE

## 2025-05-23 DIAGNOSIS — E87.1 HYPONATREMIA: ICD-10-CM

## 2025-05-23 DIAGNOSIS — D63.1 ANEMIA IN CHRONIC KIDNEY DISEASE, UNSPECIFIED CKD STAGE: ICD-10-CM

## 2025-05-23 DIAGNOSIS — R53.1 WEAKNESS: ICD-10-CM

## 2025-05-23 DIAGNOSIS — Z00.00 ROUTINE CHECK-UP: ICD-10-CM

## 2025-05-23 DIAGNOSIS — E87.6 HYPOKALEMIA: ICD-10-CM

## 2025-05-23 DIAGNOSIS — F17.200 TOBACCO DEPENDENCY: ICD-10-CM

## 2025-05-23 DIAGNOSIS — I50.9 CHRONIC CONGESTIVE HEART FAILURE, UNSPECIFIED HEART FAILURE TYPE: Primary | ICD-10-CM

## 2025-05-23 DIAGNOSIS — Z78.9 UNABLE TO CARE FOR SELF: ICD-10-CM

## 2025-05-23 DIAGNOSIS — R07.9 CHEST PAIN: ICD-10-CM

## 2025-05-23 DIAGNOSIS — K74.60 LIVER CIRRHOSIS: ICD-10-CM

## 2025-05-23 DIAGNOSIS — I48.11 LONGSTANDING PERSISTENT ATRIAL FIBRILLATION: ICD-10-CM

## 2025-05-23 DIAGNOSIS — I50.9 CONGESTIVE HEART FAILURE, UNSPECIFIED HF CHRONICITY, UNSPECIFIED HEART FAILURE TYPE: ICD-10-CM

## 2025-05-23 DIAGNOSIS — R79.89 ELEVATED TROPONIN: ICD-10-CM

## 2025-05-23 DIAGNOSIS — N30.00 ACUTE CYSTITIS WITHOUT HEMATURIA: ICD-10-CM

## 2025-05-23 DIAGNOSIS — N17.9 AKI (ACUTE KIDNEY INJURY): ICD-10-CM

## 2025-05-23 DIAGNOSIS — K74.60 HEPATIC CIRRHOSIS, UNSPECIFIED HEPATIC CIRRHOSIS TYPE, UNSPECIFIED WHETHER ASCITES PRESENT: ICD-10-CM

## 2025-05-23 DIAGNOSIS — N18.9 ANEMIA IN CHRONIC KIDNEY DISEASE, UNSPECIFIED CKD STAGE: ICD-10-CM

## 2025-05-23 DIAGNOSIS — R06.02 SOB (SHORTNESS OF BREATH): ICD-10-CM

## 2025-05-23 DIAGNOSIS — I25.10 ARTERIOSCLEROSIS OF CORONARY ARTERY: ICD-10-CM

## 2025-05-23 LAB
ALBUMIN SERPL-MCNC: 3.6 G/DL (ref 3.4–4.8)
ALBUMIN/GLOB SERPL: 0.9 RATIO (ref 1.1–2)
ALP SERPL-CCNC: 242 UNIT/L (ref 40–150)
ALT SERPL-CCNC: 37 UNIT/L (ref 0–55)
ANION GAP SERPL CALC-SCNC: 18 MEQ/L
ANISOCYTOSIS BLD QL SMEAR: ABNORMAL
AST SERPL-CCNC: 32 UNIT/L (ref 11–45)
BACTERIA #/AREA URNS AUTO: ABNORMAL /HPF
BASOPHILS # BLD AUTO: 0 X10(3)/MCL
BASOPHILS NFR BLD AUTO: 0 %
BILIRUB SERPL-MCNC: 5.4 MG/DL
BILIRUB UR QL STRIP.AUTO: NEGATIVE
BNP BLD-MCNC: 2615.6 PG/ML
BUN SERPL-MCNC: 36.6 MG/DL (ref 8.4–25.7)
CALCIUM SERPL-MCNC: 9.3 MG/DL (ref 8.8–10)
CHLORIDE SERPL-SCNC: 92 MMOL/L (ref 98–107)
CLARITY UR: CLEAR
CO2 SERPL-SCNC: 19 MMOL/L (ref 23–31)
COLOR UR AUTO: YELLOW
CREAT SERPL-MCNC: 1.45 MG/DL (ref 0.72–1.25)
CREAT/UREA NIT SERPL: 25
EOSINOPHIL # BLD AUTO: 0.01 X10(3)/MCL (ref 0–0.9)
EOSINOPHIL NFR BLD AUTO: 0.2 %
ERYTHROCYTE [DISTWIDTH] IN BLOOD BY AUTOMATED COUNT: 25 % (ref 11.5–17)
GFR SERPLBLD CREATININE-BSD FMLA CKD-EPI: 53 ML/MIN/1.73/M2
GLOBULIN SER-MCNC: 4 GM/DL (ref 2.4–3.5)
GLUCOSE SERPL-MCNC: 111 MG/DL (ref 82–115)
GLUCOSE UR QL STRIP: NORMAL
HCT VFR BLD AUTO: 25.9 % (ref 42–52)
HGB BLD-MCNC: 8.6 G/DL (ref 14–18)
HGB UR QL STRIP: NEGATIVE
HOLD SPECIMEN: NORMAL
HYALINE CASTS #/AREA URNS LPF: ABNORMAL /LPF
IMM GRANULOCYTES # BLD AUTO: 0.02 X10(3)/MCL (ref 0–0.04)
IMM GRANULOCYTES NFR BLD AUTO: 0.3 %
KETONES UR QL STRIP: NEGATIVE
LACTATE SERPL-SCNC: 2.8 MMOL/L (ref 0.5–2.2)
LEUKOCYTE ESTERASE UR QL STRIP: 500
LYMPHOCYTES # BLD AUTO: 0.45 X10(3)/MCL (ref 0.6–4.6)
LYMPHOCYTES NFR BLD AUTO: 7.6 %
MCH RBC QN AUTO: 25.2 PG (ref 27–31)
MCHC RBC AUTO-ENTMCNC: 33.2 G/DL (ref 33–36)
MCV RBC AUTO: 76 FL (ref 80–94)
MICROCYTES BLD QL SMEAR: ABNORMAL
MONOCYTES # BLD AUTO: 0.38 X10(3)/MCL (ref 0.1–1.3)
MONOCYTES NFR BLD AUTO: 6.4 %
MUCOUS THREADS URNS QL MICRO: ABNORMAL /LPF
NEUTROPHILS # BLD AUTO: 5.09 X10(3)/MCL (ref 2.1–9.2)
NEUTROPHILS NFR BLD AUTO: 85.5 %
NITRITE UR QL STRIP: NEGATIVE
NRBC BLD AUTO-RTO: 0 %
PH UR STRIP: 5.5 [PH]
PLATELET # BLD AUTO: 141 X10(3)/MCL (ref 130–400)
PLATELET # BLD EST: ADEQUATE 10*3/UL
PLATELETS.RETICULATED NFR BLD AUTO: 3.6 % (ref 0.9–11.2)
PMV BLD AUTO: ABNORMAL FL
POTASSIUM SERPL-SCNC: 4.4 MMOL/L (ref 3.5–5.1)
PROT SERPL-MCNC: 7.6 GM/DL (ref 5.8–7.6)
PROT UR QL STRIP: ABNORMAL
RBC # BLD AUTO: 3.41 X10(6)/MCL (ref 4.7–6.1)
RBC #/AREA URNS AUTO: ABNORMAL /HPF
RBC MORPH BLD: ABNORMAL
SODIUM SERPL-SCNC: 129 MMOL/L (ref 136–145)
SP GR UR STRIP.AUTO: 1.01 (ref 1–1.03)
SQUAMOUS #/AREA URNS LPF: ABNORMAL /HPF
TROPONIN I SERPL-MCNC: 0.06 NG/ML (ref 0–0.04)
UROBILINOGEN UR STRIP-ACNC: ABNORMAL
WBC # BLD AUTO: 5.95 X10(3)/MCL (ref 4.5–11.5)
WBC #/AREA URNS AUTO: ABNORMAL /HPF

## 2025-05-23 PROCEDURE — 87086 URINE CULTURE/COLONY COUNT: CPT | Performed by: INTERNAL MEDICINE

## 2025-05-23 PROCEDURE — 83605 ASSAY OF LACTIC ACID: CPT | Mod: 91 | Performed by: INTERNAL MEDICINE

## 2025-05-23 PROCEDURE — 63600175 PHARM REV CODE 636 W HCPCS

## 2025-05-23 PROCEDURE — 93005 ELECTROCARDIOGRAM TRACING: CPT

## 2025-05-23 PROCEDURE — 96361 HYDRATE IV INFUSION ADD-ON: CPT

## 2025-05-23 PROCEDURE — 96374 THER/PROPH/DIAG INJ IV PUSH: CPT

## 2025-05-23 PROCEDURE — G0378 HOSPITAL OBSERVATION PER HR: HCPCS

## 2025-05-23 PROCEDURE — 81001 URINALYSIS AUTO W/SCOPE: CPT | Performed by: INTERNAL MEDICINE

## 2025-05-23 PROCEDURE — 25000003 PHARM REV CODE 250

## 2025-05-23 PROCEDURE — 83880 ASSAY OF NATRIURETIC PEPTIDE: CPT | Performed by: INTERNAL MEDICINE

## 2025-05-23 PROCEDURE — 99285 EMERGENCY DEPT VISIT HI MDM: CPT | Mod: 25

## 2025-05-23 PROCEDURE — 80053 COMPREHEN METABOLIC PANEL: CPT | Performed by: INTERNAL MEDICINE

## 2025-05-23 PROCEDURE — 85025 COMPLETE CBC W/AUTO DIFF WBC: CPT | Performed by: INTERNAL MEDICINE

## 2025-05-23 PROCEDURE — 84484 ASSAY OF TROPONIN QUANT: CPT | Performed by: INTERNAL MEDICINE

## 2025-05-23 PROCEDURE — 87040 BLOOD CULTURE FOR BACTERIA: CPT | Mod: 59 | Performed by: INTERNAL MEDICINE

## 2025-05-23 PROCEDURE — 63600175 PHARM REV CODE 636 W HCPCS: Performed by: INTERNAL MEDICINE

## 2025-05-23 RX ORDER — NALOXONE HCL 0.4 MG/ML
0.02 VIAL (ML) INJECTION
Status: DISCONTINUED | OUTPATIENT
Start: 2025-05-23 | End: 2025-06-05 | Stop reason: HOSPADM

## 2025-05-23 RX ORDER — SODIUM CHLORIDE 0.9 % (FLUSH) 0.9 %
10 SYRINGE (ML) INJECTION EVERY 12 HOURS PRN
Status: DISCONTINUED | OUTPATIENT
Start: 2025-05-23 | End: 2025-06-05 | Stop reason: HOSPADM

## 2025-05-23 RX ORDER — CEFTRIAXONE 1 G/1
1 INJECTION, POWDER, FOR SOLUTION INTRAMUSCULAR; INTRAVENOUS
Status: DISCONTINUED | OUTPATIENT
Start: 2025-05-24 | End: 2025-05-25

## 2025-05-23 RX ORDER — GLUCAGON 1 MG
1 KIT INJECTION
Status: DISCONTINUED | OUTPATIENT
Start: 2025-05-23 | End: 2025-06-05 | Stop reason: HOSPADM

## 2025-05-23 RX ORDER — SERTRALINE HYDROCHLORIDE 50 MG/1
100 TABLET, FILM COATED ORAL DAILY
Status: DISCONTINUED | OUTPATIENT
Start: 2025-05-24 | End: 2025-05-28

## 2025-05-23 RX ORDER — IBUPROFEN 200 MG
24 TABLET ORAL
Status: DISCONTINUED | OUTPATIENT
Start: 2025-05-23 | End: 2025-06-05 | Stop reason: HOSPADM

## 2025-05-23 RX ORDER — PANTOPRAZOLE SODIUM 40 MG/1
40 TABLET, DELAYED RELEASE ORAL DAILY
Status: DISCONTINUED | OUTPATIENT
Start: 2025-05-24 | End: 2025-06-05 | Stop reason: HOSPADM

## 2025-05-23 RX ORDER — INSULIN ASPART 100 [IU]/ML
0-5 INJECTION, SOLUTION INTRAVENOUS; SUBCUTANEOUS
Status: DISCONTINUED | OUTPATIENT
Start: 2025-05-23 | End: 2025-06-05 | Stop reason: HOSPADM

## 2025-05-23 RX ORDER — TALC
6 POWDER (GRAM) TOPICAL NIGHTLY PRN
Status: DISCONTINUED | OUTPATIENT
Start: 2025-05-23 | End: 2025-06-05 | Stop reason: HOSPADM

## 2025-05-23 RX ORDER — IBUPROFEN 200 MG
16 TABLET ORAL
Status: DISCONTINUED | OUTPATIENT
Start: 2025-05-23 | End: 2025-06-05 | Stop reason: HOSPADM

## 2025-05-23 RX ORDER — SODIUM CHLORIDE, SODIUM LACTATE, POTASSIUM CHLORIDE, CALCIUM CHLORIDE 600; 310; 30; 20 MG/100ML; MG/100ML; MG/100ML; MG/100ML
1000 INJECTION, SOLUTION INTRAVENOUS
Status: COMPLETED | OUTPATIENT
Start: 2025-05-23 | End: 2025-05-23

## 2025-05-23 RX ORDER — GABAPENTIN 300 MG/1
300 CAPSULE ORAL 3 TIMES DAILY
Status: DISCONTINUED | OUTPATIENT
Start: 2025-05-24 | End: 2025-05-28

## 2025-05-23 RX ORDER — CEFTRIAXONE 1 G/1
1 INJECTION, POWDER, FOR SOLUTION INTRAMUSCULAR; INTRAVENOUS
Status: COMPLETED | OUTPATIENT
Start: 2025-05-23 | End: 2025-05-23

## 2025-05-23 RX ORDER — BUMETANIDE 1 MG/1
1 TABLET ORAL DAILY
Status: DISCONTINUED | OUTPATIENT
Start: 2025-05-24 | End: 2025-05-24

## 2025-05-23 RX ORDER — BUMETANIDE 1 MG/1
1 TABLET ORAL ONCE
Status: DISCONTINUED | OUTPATIENT
Start: 2025-05-23 | End: 2025-05-23

## 2025-05-23 RX ORDER — CLOPIDOGREL BISULFATE 75 MG/1
75 TABLET ORAL DAILY
Status: DISCONTINUED | OUTPATIENT
Start: 2025-05-24 | End: 2025-06-05 | Stop reason: HOSPADM

## 2025-05-23 RX ORDER — PREDNISONE 20 MG/1
20 TABLET ORAL 2 TIMES DAILY
Status: COMPLETED | OUTPATIENT
Start: 2025-05-23 | End: 2025-05-25

## 2025-05-23 RX ORDER — CHOLECALCIFEROL (VITAMIN D3) 25 MCG
1000 TABLET ORAL DAILY
Status: DISCONTINUED | OUTPATIENT
Start: 2025-05-24 | End: 2025-06-05 | Stop reason: HOSPADM

## 2025-05-23 RX ORDER — BUMETANIDE 1 MG/1
1 TABLET ORAL DAILY PRN
Status: DISCONTINUED | OUTPATIENT
Start: 2025-05-23 | End: 2025-05-23

## 2025-05-23 RX ORDER — PREDNISONE 5 MG/1
5 TABLET ORAL 2 TIMES DAILY
Status: DISCONTINUED | OUTPATIENT
Start: 2025-06-09 | End: 2025-06-02

## 2025-05-23 RX ORDER — PREDNISONE 10 MG/1
10 TABLET ORAL 2 TIMES DAILY
Status: DISCONTINUED | OUTPATIENT
Start: 2025-06-02 | End: 2025-06-02

## 2025-05-23 RX ORDER — ATORVASTATIN CALCIUM 40 MG/1
80 TABLET, FILM COATED ORAL DAILY
Status: DISCONTINUED | OUTPATIENT
Start: 2025-05-24 | End: 2025-06-05 | Stop reason: HOSPADM

## 2025-05-23 RX ORDER — QUETIAPINE FUMARATE 100 MG/1
100 TABLET, FILM COATED ORAL NIGHTLY
Status: DISCONTINUED | OUTPATIENT
Start: 2025-05-23 | End: 2025-05-28

## 2025-05-23 RX ORDER — IPRATROPIUM BROMIDE AND ALBUTEROL SULFATE 2.5; .5 MG/3ML; MG/3ML
3 SOLUTION RESPIRATORY (INHALATION) EVERY 4 HOURS PRN
Status: DISCONTINUED | OUTPATIENT
Start: 2025-05-23 | End: 2025-06-05 | Stop reason: HOSPADM

## 2025-05-23 RX ADMIN — CEFTRIAXONE SODIUM 1 G: 1 INJECTION, POWDER, FOR SOLUTION INTRAMUSCULAR; INTRAVENOUS at 09:05

## 2025-05-23 RX ADMIN — QUETIAPINE FUMARATE 100 MG: 100 TABLET ORAL at 10:05

## 2025-05-23 RX ADMIN — SODIUM CHLORIDE, POTASSIUM CHLORIDE, SODIUM LACTATE AND CALCIUM CHLORIDE 1000 ML: 600; 310; 30; 20 INJECTION, SOLUTION INTRAVENOUS at 08:05

## 2025-05-23 RX ADMIN — PREDNISONE 20 MG: 20 TABLET ORAL at 10:05

## 2025-05-24 LAB
ALBUMIN SERPL-MCNC: 3.3 G/DL (ref 3.4–4.8)
ALBUMIN/GLOB SERPL: 0.9 RATIO (ref 1.1–2)
ALP SERPL-CCNC: 256 UNIT/L (ref 40–150)
ALT SERPL-CCNC: 35 UNIT/L (ref 0–55)
ANION GAP SERPL CALC-SCNC: 11 MEQ/L
AST SERPL-CCNC: 34 UNIT/L (ref 11–45)
BASOPHILS # BLD AUTO: 0 X10(3)/MCL
BASOPHILS NFR BLD AUTO: 0 %
BILIRUB SERPL-MCNC: 5 MG/DL
BUN SERPL-MCNC: 38.6 MG/DL (ref 8.4–25.7)
CALCIUM SERPL-MCNC: 9.3 MG/DL (ref 8.8–10)
CHLORIDE SERPL-SCNC: 96 MMOL/L (ref 98–107)
CO2 SERPL-SCNC: 22 MMOL/L (ref 23–31)
CREAT SERPL-MCNC: 1.33 MG/DL (ref 0.72–1.25)
CREAT/UREA NIT SERPL: 29
EOSINOPHIL # BLD AUTO: 0.01 X10(3)/MCL (ref 0–0.9)
EOSINOPHIL NFR BLD AUTO: 0.2 %
ERYTHROCYTE [DISTWIDTH] IN BLOOD BY AUTOMATED COUNT: 25.1 % (ref 11.5–17)
GFR SERPLBLD CREATININE-BSD FMLA CKD-EPI: 59 ML/MIN/1.73/M2
GLOBULIN SER-MCNC: 3.8 GM/DL (ref 2.4–3.5)
GLUCOSE SERPL-MCNC: 142 MG/DL (ref 82–115)
HCT VFR BLD AUTO: 24.6 % (ref 42–52)
HGB BLD-MCNC: 8.2 G/DL (ref 14–18)
IMM GRANULOCYTES # BLD AUTO: 0.03 X10(3)/MCL (ref 0–0.04)
IMM GRANULOCYTES NFR BLD AUTO: 0.5 %
LACTATE SERPL-SCNC: 1.6 MMOL/L (ref 0.5–2.2)
LYMPHOCYTES # BLD AUTO: 0.35 X10(3)/MCL (ref 0.6–4.6)
LYMPHOCYTES NFR BLD AUTO: 6 %
MAGNESIUM SERPL-MCNC: 1.9 MG/DL (ref 1.6–2.6)
MCH RBC QN AUTO: 25.2 PG (ref 27–31)
MCHC RBC AUTO-ENTMCNC: 33.3 G/DL (ref 33–36)
MCV RBC AUTO: 75.7 FL (ref 80–94)
MONOCYTES # BLD AUTO: 0.37 X10(3)/MCL (ref 0.1–1.3)
MONOCYTES NFR BLD AUTO: 6.3 %
NEUTROPHILS # BLD AUTO: 5.07 X10(3)/MCL (ref 2.1–9.2)
NEUTROPHILS NFR BLD AUTO: 87 %
NRBC BLD AUTO-RTO: 0 %
PHOSPHATE SERPL-MCNC: 4.1 MG/DL (ref 2.3–4.7)
PLATELET # BLD AUTO: 122 X10(3)/MCL (ref 130–400)
PLATELETS.RETICULATED NFR BLD AUTO: 3.4 % (ref 0.9–11.2)
PMV BLD AUTO: ABNORMAL FL
POTASSIUM SERPL-SCNC: 4.3 MMOL/L (ref 3.5–5.1)
PROT SERPL-MCNC: 7.1 GM/DL (ref 5.8–7.6)
RBC # BLD AUTO: 3.25 X10(6)/MCL (ref 4.7–6.1)
SODIUM SERPL-SCNC: 129 MMOL/L (ref 136–145)
TROPONIN I SERPL-MCNC: 0.05 NG/ML (ref 0–0.04)
TROPONIN I SERPL-MCNC: 0.05 NG/ML (ref 0–0.04)
TROPONIN I SERPL-MCNC: 0.06 NG/ML (ref 0–0.04)
WBC # BLD AUTO: 5.83 X10(3)/MCL (ref 4.5–11.5)

## 2025-05-24 PROCEDURE — 25000003 PHARM REV CODE 250

## 2025-05-24 PROCEDURE — 83735 ASSAY OF MAGNESIUM: CPT

## 2025-05-24 PROCEDURE — 11000001 HC ACUTE MED/SURG PRIVATE ROOM

## 2025-05-24 PROCEDURE — 94640 AIRWAY INHALATION TREATMENT: CPT

## 2025-05-24 PROCEDURE — 25000242 PHARM REV CODE 250 ALT 637 W/ HCPCS

## 2025-05-24 PROCEDURE — 99900035 HC TECH TIME PER 15 MIN (STAT)

## 2025-05-24 PROCEDURE — 94761 N-INVAS EAR/PLS OXIMETRY MLT: CPT

## 2025-05-24 PROCEDURE — 80053 COMPREHEN METABOLIC PANEL: CPT

## 2025-05-24 PROCEDURE — 84100 ASSAY OF PHOSPHORUS: CPT

## 2025-05-24 PROCEDURE — 94760 N-INVAS EAR/PLS OXIMETRY 1: CPT

## 2025-05-24 PROCEDURE — 84484 ASSAY OF TROPONIN QUANT: CPT | Mod: 91

## 2025-05-24 PROCEDURE — 85025 COMPLETE CBC W/AUTO DIFF WBC: CPT

## 2025-05-24 PROCEDURE — 63600175 PHARM REV CODE 636 W HCPCS

## 2025-05-24 PROCEDURE — 36415 COLL VENOUS BLD VENIPUNCTURE: CPT

## 2025-05-24 PROCEDURE — 93005 ELECTROCARDIOGRAM TRACING: CPT

## 2025-05-24 RX ORDER — BUMETANIDE 1 MG/1
1 TABLET ORAL EVERY OTHER DAY
Status: DISCONTINUED | OUTPATIENT
Start: 2025-05-26 | End: 2025-05-27

## 2025-05-24 RX ADMIN — RIVAROXABAN 20 MG: 10 TABLET, FILM COATED ORAL at 04:05

## 2025-05-24 RX ADMIN — IPRATROPIUM BROMIDE AND ALBUTEROL SULFATE 3 ML: 2.5; .5 SOLUTION RESPIRATORY (INHALATION) at 07:05

## 2025-05-24 RX ADMIN — PREDNISONE 20 MG: 20 TABLET ORAL at 08:05

## 2025-05-24 RX ADMIN — GABAPENTIN 300 MG: 300 CAPSULE ORAL at 08:05

## 2025-05-24 RX ADMIN — CEFTRIAXONE SODIUM 1 G: 1 INJECTION, POWDER, FOR SOLUTION INTRAMUSCULAR; INTRAVENOUS at 08:05

## 2025-05-24 RX ADMIN — BUMETANIDE 1 MG: 1 TABLET ORAL at 08:05

## 2025-05-24 RX ADMIN — GABAPENTIN 300 MG: 300 CAPSULE ORAL at 02:05

## 2025-05-24 RX ADMIN — ATORVASTATIN CALCIUM 80 MG: 40 TABLET, FILM COATED ORAL at 08:05

## 2025-05-24 RX ADMIN — SERTRALINE HYDROCHLORIDE 100 MG: 50 TABLET ORAL at 08:05

## 2025-05-24 RX ADMIN — Medication 1000 UNITS: at 08:05

## 2025-05-24 RX ADMIN — IPRATROPIUM BROMIDE AND ALBUTEROL SULFATE 3 ML: 2.5; .5 SOLUTION RESPIRATORY (INHALATION) at 09:05

## 2025-05-24 RX ADMIN — CLOPIDOGREL BISULFATE 75 MG: 75 TABLET, FILM COATED ORAL at 08:05

## 2025-05-24 RX ADMIN — QUETIAPINE FUMARATE 100 MG: 100 TABLET ORAL at 08:05

## 2025-05-24 RX ADMIN — PANTOPRAZOLE SODIUM 40 MG: 40 TABLET, DELAYED RELEASE ORAL at 08:05

## 2025-05-24 NOTE — PROGRESS NOTES
[facility] Wards   Progress Note      Resident Team: [facility] List 2  Attending Physician: Merle Sun MD  Resident: Owen Dewey MD  Intern: Roldan Cisneros MD   Hospital Length of Stay: 0 days    Subjective   Brief HPI:  Ran Reyes Jr. is a 69 y.o. 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 Of note, discharged from this facility on 5/20/25 after admission for cardiogenic/septic shock. Adrenal insufficiency noted during that admission; discharged on prednisone taper. Documented inability to tolerate GDMT. Discharged with life vest. According to documentation, was discharged with . Today, patient states he lives alone and family members have not been coming to help regularly. States he has not eaten since yesterday afternoon as no family member came to bring him food. He did not take home meds today. He is requesting nursing home placement as he is no longer able to care for himself at home. Denies chest pain, shortness of breath or palpitations. No fever, chills, nausea, vomiting, diarrhea, or urinary symptoms.     Interval History:   No acute events overnight. Vital signs stable. States feeling better than yesterday. Mild wheezing on left chest, but patient states no breathing difficulty. Breathing treatment PRN. Reports mildly diffused abdominal pain.     Review of Systems:  As stated above     Objective     Vital Signs (Most Recent):  Temp: 98.2 °F (36.8 °C) (05/24/25 0734)  Pulse: 88 (05/24/25 0734)  Resp: 17 (05/24/25 0353)  BP: 113/82 (05/24/25 0817)  SpO2: 96 % (05/24/25 0757) Vital Signs (24h Range):  Temp:   [96.4 °F (35.8 °C)-98.2 °F (36.8 °C)] 98.2 °F (36.8 °C)  Pulse:  [61-98] 88  Resp:  [15-25] 17  SpO2:  [96 %-100 %] 96 %  BP: ()/(72-90) 113/82     Physical Examination:  Physical Exam  Constitutional:       General: He is not in acute distress.  HENT:      Head: Normocephalic and atraumatic.      Nose: No congestion or rhinorrhea.      Mouth/Throat:      Mouth: Mucous membranes are moist.      Pharynx: Oropharynx is clear.   Eyes:      Extraocular Movements: Extraocular movements intact.      Pupils: Pupils are equal, round, and reactive to light.   Cardiovascular:      Rate and Rhythm: Normal rate and regular rhythm.      Heart sounds: Murmur heard.   Pulmonary:      Effort: Pulmonary effort is normal.      Breath sounds: Wheezing (Left chest) present.   Abdominal:      Tenderness: There is abdominal tenderness (Diffused (L>R)).   Musculoskeletal:      Cervical back: Normal range of motion. No rigidity or tenderness.      Right lower leg: Edema (1+) present.      Left lower leg: Edema (1+) present.     Laboratory:  Most Recent Data:  CBC:   Lab Results   Component Value Date    WBC 5.83 05/24/2025    HGB 8.2 (L) 05/24/2025    HCT 24.6 (L) 05/24/2025     (L) 05/24/2025    MCV 75.7 (L) 05/24/2025    RDW 25.1 (H) 05/24/2025     WBC Differential:   Recent Labs   Lab 05/18/25  0345 05/19/25  0510 05/20/25  0326 05/23/25  1920 05/24/25  0328   WBC 11.37 10.94 10.88 5.95 5.83   HGB 8.8* 8.7* 8.2* 8.6* 8.2*   HCT 27.9* 27.1* 25.7* 25.9* 24.6*    174 171 141 122*   MCV 76.4* 76.1* 76.9* 76.0* 75.7*     BMP:   Lab Results   Component Value Date     (L) 05/24/2025    K 4.3 05/24/2025    CL 96 (L) 05/24/2025    CO2 22 (L) 05/24/2025    BUN 38.6 (H) 05/24/2025    CREATININE 1.33 (H) 05/24/2025     (H) 05/24/2025    CALCIUM 9.3 05/24/2025    MG 1.90 05/24/2025    PHOS 4.1 05/24/2025     LFTs:   Lab Results   Component Value Date    PROT 7.1 05/24/2025    ALBUMIN 3.3 (L) 05/24/2025    BILITOT 5.0  (H) 05/24/2025    AST 34 05/24/2025    ALKPHOS 256 (H) 05/24/2025    ALT 35 05/24/2025     Coags:   Lab Results   Component Value Date    INR 3.8 (H) 03/07/2025    PROTIME 38.1 (H) 03/07/2025     FLP:   Lab Results   Component Value Date    CHOL 96 08/14/2024    HDL 32 (L) 08/14/2024    TRIG 73 08/14/2024     DM:   Lab Results   Component Value Date    HGBA1C 5.0 03/18/2024    HGBA1C 4.7 11/15/2021    HGBA1C 5.7 10/07/2020    CREATININE 1.33 (H) 05/24/2025     Thyroid:   Lab Results   Component Value Date    TSH 1.097 03/18/2024      Anemia:   Lab Results   Component Value Date    IRON 22 (L) 04/22/2025    TIBC 297 04/22/2025    FERRITIN 51.82 04/22/2025       Lab Results   Component Value Date    NKWWAFFA35 >2,000 (H) 04/22/2025       Lab Results   Component Value Date    FOLATE 18.5 04/22/2025        Cardiac:   Lab Results   Component Value Date    TROPONINI 0.046 (H) 05/24/2025    BNP 2,615.6 (H) 05/23/2025         Microbiology Data:  Microbiology Results (last 7 days)       Procedure Component Value Units Date/Time    Blood Culture #2 **CANNOT BE ORDERED STAT** [[id number]] Collected: 05/23/25 2136    Order Status: Sent Specimen: Blood Updated: 05/23/25 2146    Blood Culture #1 **CANNOT BE ORDERED STAT** [[id number]] Collected: 05/23/25 2127    Order Status: Sent Specimen: Blood from Hand, Right Updated: 05/23/25 2146    Urine culture [[id number]] Collected: 05/23/25 1955    Order Status: Sent Specimen: Urine Updated: 05/23/25 2028              Other Results:  EKG:   Results for orders placed or performed during the hospital encounter of 05/23/25   EKG 12-lead    Collection Time: 05/24/25  3:18 AM   Result Value Ref Range    QRS Duration 92 ms    OHS QTC Calculation 472 ms    Narrative    Test Reason : R79.89,    Vent. Rate :  92 BPM     Atrial Rate :  93 BPM     P-R Int :    ms          QRS Dur :  92 ms      QT Int : 382 ms       P-R-T Axes :     50 142 degrees    QTcB Int : 472 ms    Atrial fibrillation with  premature ventricular or aberrantly conducted  complexes  Nonspecific T wave abnormality  Prolonged QT  Abnormal ECG  When compared with ECG of 23-May-2025 19:13,  Previous ECG has undetermined rhythm, needs review    Referred By: AAAREFERRAL SELF           Confirmed By:          Radiology:  Imaging Results              X-Ray Chest 1 View (Final result)  Result time 05/23/25 19:54:58      Final result by Ramana Mcleod MD (05/23/25 19:54:58)                   Narrative:    EXAMINATION  XR CHEST 1 VIEW    CLINICAL HISTORY  weakness;    TECHNIQUE  A total of 1 frontal image(s) submitted of the chest.    COMPARISON  4 May 2025    FINDINGS  Lines/tubes/devices: ECG leads overlie the imaged region.  Right PICC no longer visualized.    There is similar enlargement the cardiac silhouette, central vascular congestion, and widespread lung interstitial changes.  The trachea is midline. No new or worsening consolidation is developed in the interval.  There is no large pleural effusion or convincing pneumothorax.    Regional osseous structures and extrathoracic soft tissues are similar.    IMPRESSION  No acute process or other adverse interval change.  Chronic secondary findings discussed above.      Electronically signed by: Ramana Mcleod  Date:    05/23/2025  Time:    19:54                                    Current Medications:     Infusions:       Scheduled:   atorvastatin  80 mg Oral Daily    bumetanide  1 mg Oral Daily    cefTRIAXone (Rocephin) IV (PEDS and ADULTS)  1 g Intravenous Q24H    clopidogreL  75 mg Oral Daily    gabapentin  300 mg Oral TID    pantoprazole  40 mg Oral Daily    predniSONE  20 mg Oral BID    Followed by    [START ON 5/26/2025] predniSONE  15 mg Oral BID    Followed by    [START ON 6/2/2025] predniSONE  10 mg Oral BID    Followed by    [START ON 6/9/2025] predniSONE  5 mg Oral BID    QUEtiapine  100 mg Oral QHS    rivaroxaban  20 mg Oral Daily    sertraline  100 mg Oral Daily    vitamin D  1,000  Units Oral Daily        PRN:    Current Facility-Administered Medications:     albuterol-ipratropium, 3 mL, Nebulization, Q4H PRN    dextrose 50%, 12.5 g, Intravenous, PRN    dextrose 50%, 25 g, Intravenous, PRN    glucagon (human recombinant), 1 mg, Intramuscular, PRN    glucose, 16 g, Oral, PRN    glucose, 24 g, Oral, PRN    insulin aspart U-100, 0-5 Units, Subcutaneous, QID (AC + HS) PRN    melatonin, 6 mg, Oral, Nightly PRN    naloxone, 0.02 mg, Intravenous, PRN    sodium chloride 0.9%, 10 mL, Intravenous, Q12H PRN        Intake/Output Summary (Last 24 hours) at 5/24/2025 0836  Last data filed at 5/24/2025 0607  Gross per 24 hour   Intake 120 ml   Output 200 ml   Net -80 ml       Lines/Drains/Airways       Peripheral Intravenous Line  Duration                  Peripheral IV - Single Lumen 05/23/25 1838 20 G Left;Posterior Hand <1 day                    Assessment and Plan      UTI  -afebrile, no leukocytosis  -lactate 2.8; f/u repeat  -UA with trace protein, 500 LE, 21-50 WBC, occasional bacteria, 3-5 hyaline casts  -pending blood and urine cultures  -continue Rocephin; de-escalate abx as appropriate     ELIZABETH  Hyponatremia  Anion gap metabolic acidosis  -Na 129, Cl 92, bicarb 19, anion gap 18, BUN/Cr 36.6/1.45  -suspect anion gap in setting of elevated lactic acid  -hyponatremia potentially multifactorial with chronicity noted on prior admissions. Appears asymptomatic at this time.   -potential contributions: hypervolemic hyponatremia d/t CHF vs hypovolemia-related d/t poor oral intake/diuretic use vs adrenal insufficiency  -prerenal ELIZABETH possibly 2/2 poor intake  -resume oral intake. S/p 1L LR in ED. Replete volume judiciously in setting of HFrEF.  -continue electrolyte goals: K> 4 Mag > 3 Phos > 2      Troponinemia  Chronic persistent A-fib   Nonobstructive CAD (per 3/2024 [facility]), NICM  HFrEF   HTN  pHTN  PVD   HLD  -exam with 1+ pitting edema to BLE; patient states this is baseline and discharge summary from 5/20  documents similar swelling. No other overt signs of fluid overload.  -troponin 0.057. EKG with afib (rate 98) with PVCs; T wave inversions in 1 and aVL (noted on priors); ST abnormalities in V1 and V3 (noted on priors). No chest pain or dynamic EKG changes. Will continue to trend troponin and EKG q6h.  -mildly elevated troponin suspected d/t chronic myocardial strain in setting of HFrEF in addition to impaired renal clearance with ELIZABETH. Also potentially deman ischemia in setting of infection (UTI).   -BNP 2615 (down from 3765 on 5/13)  -CXR demonstrating cardiomegaly, central vascular congestion and chronic interstitial changes; on my read vascular congestion improved from recent hospitalization.   -TTE 4/2025: EF 22% with PASP 59 mm Hg and grade 3 diastolic dysfunction   -clinically does not appear to be in acute exacerbation at this time. Resume home Bumex PO 1mg qd. Will hold IV diuretics at this time unless signs of decompensation develop.   -daily weights, strict I/O  -documented inability to tolerate GDMT  -continue anticoagulation for CVA risk reduction in setting of atrial fibrillation.    -continue SAPT and high-intensity statin therapy  -cardiac monitoring; defibrillator leads attached due to bedbug infestation of [employer] wearable defibrillator     Adrenal insufficiency  -continue prednisone taper (initiated during recent hospitalization)     COPD  -respiratory status is stable at this time   -duonebs q4h PRN     Anxiety/insomnia   -continue home Quetiapine 100 mg qhs,sertraline 100mg qd, and gabapentin 300mg TID           CODE STATUS: full code  Access: PIV  Antibiotics: Rocephin  Diet: heart healthy  DVT Prophylaxis: Xarelto  GI Prophylaxis: none  Fluids: no IVF, oral intake    Roldan Cisneros MD  [facility], -1  05/24/2025

## 2025-05-24 NOTE — H&P
[facility] Medicine Wards History and Physical Note      Resident Team: [facility] Medicine List 2  Attending Physician: Merle Sun MD    Date of Admit: 5/23/2025  Chief Complaint   Fatigue and Leg Swelling (PT IN /[facility] W REPORT OF WEAKNESS/SOB AND EDEMA TO LOWER LEGS. STATES HAS NOT EATEN X 18 HRS.  CBG EN ROUTE 137. 20G IV TO LT HAND /EMS.   PT FOUND TO HAVE BED BUGS /[facility]. EKG TO BE OBTAINED. )       ALESSANDRO Reyes Jr. is a 69 y.o. 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 Of note, discharged from this facility on 5/20/25 after admission for cardiogenic/septic shock. Adrenal insufficiency noted during that admission; discharged on prednisone taper. Documented inability to tolerate GDMT. Discharged with life vest. According to documentation, was discharged with HH. Today, patient states he lives alone and family members have not been coming to help regularly. States he has not eaten since yesterday afternoon as no family member came to bring him food. He did not take home meds today. He is requesting nursing home placement as he is no longer able to care for himself at home. Denies chest pain, shortness of breath or palpitations. No fever, chills, nausea, vomiting, diarrhea, or urinary symptoms.     On arrival to ED, temp 97.9F, /86, HR 98, RR 18, sats 98% on room air. ED staff noted patient to be covered in bed bugs; he was cleaned/clothes changed and life vest removed with defibrillator leads placed. BLE with 1+ pitting edema (patient states this is baseline). Labs with H/H 8.6/25.9 (appears this is baseline), Na 129, Cl 92, bicarb 19,  BUN/Cr 36.6/1.45. Troponin 0.057, BNP 2615 (from 3765 on 5/13). Lactic acid 2.8. UA with trace protein, 500 LE, 21-50 WBC, occasional bacteria, 3-5 hyaline casts. EKG with afib (rate 98) with PVCs; T wave inversions in 1 and aVL (noted on priors); ST abnormalities in V1 and V3 (noted on priors). CXR demonstrating cardiomegaly, central vascular congestion and chronic interstitial changes; on my read vascular congestion improved from recent hospitalization. In ED, he received Rocephin and 1L  LR. Hospital Medicine consulted for ELIZABETH, UTI,  and placement.    History    PMH:  Past Medical History:   Diagnosis Date    A-fib     Anticoagulant long-term use     Anxiety     Aortic aneurysm     Cataract     CHF (congestive heart failure)     Chronic atrial fibrillation     COPD (chronic obstructive pulmonary disease)     Coronary artery disease     Depression     HLD (hyperlipidemia)     Hypertension     Mitral regurgitation     PAD (peripheral artery disease)     Primary open angle glaucoma (POAG)      Past Surgical History:   Past Surgical History:   Procedure Laterality Date    ANGIOGRAM, CORONARY, WITH LEFT HEART CATHETERIZATION N/A 03/26/2024    Procedure: Angiogram, Coronary, with Left Heart Cath;  Surgeon: Ardiano Montiel MD;  Location: [facility] CATH LAB;  Service: Cardiology;  Laterality: N/A;    ATHERECTOMY OF PERIPHERAL VESSEL Left 09/12/2022    LEFT SFA ATHERECTOMY, BALLOON ANGIOPLASTY    CATARACT EXTRACTION W/  INTRAOCULAR LENS IMPLANT Left 03/09/2023    Procedure: EXTRACTION, CATARACT, WITH IOL INSERTION;  Surgeon: Georgi Borja MD;  Location: [facility];  Service: Ophthalmology;  Laterality: Left;  19.5  mac    EGD, WITH CLOSED BIOPSY  03/22/2024    Procedure: EGD, WITH CLOSED BIOPSY;  Surgeon: Ronald Calderon MD;  Location: [facility] ENDOSCOPY;  Service: Gastroenterology;;    ESOPHAGOGASTRODUODENOSCOPY N/A 03/22/2024    Procedure: EGD;  Surgeon: Ronald Calderon MD;  Location: [facility] ENDOSCOPY;   Service: Gastroenterology;  Laterality: N/A;    Heart Stent N/A     > 10yrs. AGO    INCISION AND DRAINAGE N/A 03/18/2024    Procedure: Incision and Drainage;  Surgeon: Fahad Rivas MD;  Location: [facility] OR;  Service: Urology;  Laterality: N/A;  I&D SCROTAL ABSCESS    INSERTION OF STENT INTO PERIPHERAL VESSEL Right 10/17/2022    RIGHT SFA ATHERECTOMY, BALLOON ANGIOPLASTY, STENT; RIGHT ANTERIOR TIBIAL ARTERY ATHERECTOMY, BALLOON ANGIOPLASTY    ORCHIECTOMY Left 03/18/2024    Procedure: ORCHIECTOMY;  Surgeon: Fahad Rivas MD;  Location: [facility] OR;  Service: Urology;  Laterality: Left;     Family History:   Family History   Problem Relation Name Age of Onset    Cancer Mother      Hypertension Mother      Heart failure Father      Stroke Father      Hypertension Father      No Known Problems Sister       Social: Social History[1]  Allergies: Review of patient's allergies indicates:  No Known Allergies  Home Medications   Prior to Admission medications    Medication Sig Start Date End Date Taking? Authorizing Provider   acetaminophen 325 mg Cap Take 650 mg by mouth 2 (two) times daily as needed.    Provider, Historical   albuterol (PROVENTIL/VENTOLIN HFA) 90 mcg/actuation inhaler Inhale 2 puffs into the lungs every 6 (six) hours as needed for Shortness of Breath or Wheezing. Rescue 5/19/25   Marino Marquez DO   albuterol-ipratropium (DUO-NEB) 2.5 mg-0.5 mg/3 mL nebulizer solution Take 3 mLs by nebulization every 6 (six) hours as needed for Shortness of Breath (For use when shortness of breath persists following inhaler usage). Rescue 5/19/25   Marino Marquez DO   atorvastatin (LIPITOR) 80 MG tablet Take 1 tablet (80 mg total) by mouth once daily. 5/19/25 8/17/25  Marino Marquez DO   BREZTRI AEROSPHERE 160-9-4.8 mcg/actuation HFAA Inhale 2 puffs into the lungs 2 (two) times daily. 1/7/25   Provider, Historical   bumetanide (BUMEX) 1 MG tablet Take 1 tablet (1 mg total) by mouth daily as needed (Take every other  day for heart failure. Take every day if needed for increasing fluid status.). 5/20/25 5/20/26  Marino Marquez DO   clopidogreL (PLAVIX) 75 mg tablet Take 1 tablet (75 mg total) by mouth once daily. 5/19/25   Marino Marquez DO   diphenhydrAMINE (BENADRYL) 25 mg capsule Take 1 capsule (25 mg total) by mouth every 6 (six) hours as needed for Itching. 5/19/25   Marino Marquez DO   folic acid (FOLVITE) 1 MG tablet Take 1 tablet (1 mg total) by mouth once daily. 4/9/24 4/9/25  Tha Edmond MD   gabapentin (NEURONTIN) 300 MG capsule Take 300 mg by mouth 3 (three) times daily.    Provider, Historical   olopatadine (PATANOL) 0.1 % ophthalmic solution Apply to eye. 2/7/25   Provider, Historical   pantoprazole (PROTONIX) 40 MG tablet Take 1 tablet (40 mg total) by mouth once daily. 5/20/25 5/20/26  Marino Marquez DO   potassium chloride SA (K-DUR,KLOR-CON) 20 MEQ tablet Take 1 tablet (20 mEq total) by mouth 2 (two) times daily. 1/28/25 1/28/26  Vernon Cifuentes MD   predniSONE (DELTASONE) 5 MG tablet Take 4 tablets (20 mg total) by mouth 2 (two) times daily for 7 days, THEN 3 tablets (15 mg total) 2 (two) times daily for 7 days, THEN 2 tablets (10 mg total) 2 (two) times daily for 7 days, THEN 1 tablet (5 mg total) 2 (two) times daily for 7 days. 5/19/25 6/16/25  Marino Marquez DO   QUEtiapine (SEROQUEL) 100 MG Tab Take 1 tablet (100 mg total) by mouth every evening. 5/19/25 6/18/25  Marino Marquez DO   rivaroxaban (XARELTO) 20 mg Tab Take 1 tablet (20 mg total) by mouth Daily. 5/19/25   Marino Marquez DO   senna-docusate (PERICOLACE) 8.6-50 mg per tablet Take 1 tablet by mouth 2 (two) times daily as needed for Constipation (Second line). 5/19/25   Marino Marquez DO   sertraline (ZOLOFT) 100 MG tablet Take 100 mg by mouth once daily.    Provider, Historical   sulfamethoxazole-trimethoprim 800-160mg (BACTRIM DS) 800-160 mg Tab Take 1 tablet by mouth 2 (two) times daily. 5/19/25   Marino Marquez DO   urea (CARMOL) 40 % Crea  Apply topically. 25   Provider, Historical   varenicline tartrate (CHANTIX) 1 mg Tab Take 0.5 mg by mouth. 25   Provider, Historical   vitamin D (VITAMIN D3) 1000 units Tab Take 1,000 Units by mouth once daily.    Provider, Historical       Review of Systems   Review of Systems   Constitutional:  Positive for malaise/fatigue. Negative for chills, diaphoresis and fever.   Respiratory:  Negative for sputum production and shortness of breath.    Cardiovascular:  Positive for leg swelling. Negative for chest pain and palpitations.   Gastrointestinal:  Negative for abdominal pain, diarrhea, nausea and vomiting.   Genitourinary:  Negative for dysuria and frequency.   Neurological:  Positive for weakness. Negative for dizziness and headaches.        Vital Signs    BP  Min: 108/83  Max: 129/76  Temp  Av.9 °F (36.6 °C)  Min: 97.9 °F (36.6 °C)  Max: 97.9 °F (36.6 °C)  Pulse  Av.1  Min: 77  Max: 98  Resp  Av.9  Min: 15  Max: 25  SpO2  Av.6 %  Min: 96 %  Max: 100 %  Height  Av' (182.9 cm)  Min: 6' (182.9 cm)  Max: 6' (182.9 cm)  Weight  Av kg (207 lb 3.7 oz)  Min: 94 kg (207 lb 3.7 oz)  Max: 94 kg (207 lb 3.7 oz)  Body mass index is 28.11 kg/m².  No intake/output data recorded.    Physical Exam    Physical Exam  Vitals reviewed.   Constitutional:       General: He is not in acute distress.  HENT:      Head: Normocephalic and atraumatic.      Nose: No congestion or rhinorrhea.      Mouth/Throat:      Mouth: Mucous membranes are dry.   Eyes:      Extraocular Movements: Extraocular movements intact.      Pupils: Pupils are equal, round, and reactive to light.      Comments: [place] brown sclera   Neck:      Vascular: No JVD.   Cardiovascular:      Rate and Rhythm: Normal rate. Rhythm irregular.      Heart sounds: Murmur heard.      No gallop.   Pulmonary:      Effort: Pulmonary effort is normal. No respiratory distress.      Breath sounds: Normal breath sounds. No wheezing, rhonchi or rales.    Abdominal:      General: Bowel sounds are normal.      Palpations: Abdomen is soft.      Tenderness: There is no abdominal tenderness. There is no guarding or rebound.   Musculoskeletal:      Right lower leg: Edema (1+ pitting) present.      Left lower leg: Edema (1+ pitting) present.   Skin:     General: Skin is warm.      Capillary Refill: Capillary refill takes less than 2 seconds.   Neurological:      Mental Status: He is alert and oriented to person, place, and time.         Labs    Most Recent Data:  CBC:   Lab Results   Component Value Date    WBC 5.95 05/23/2025    HGB 8.6 (L) 05/23/2025    HCT 25.9 (L) 05/23/2025     05/23/2025    MCV 76.0 (L) 05/23/2025    RDW 25.0 (H) 05/23/2025     WBC Differential:   Recent Labs   Lab 05/17/25  0317 05/18/25  0345 05/19/25  0510 05/20/25  0326 05/23/25  1920   WBC 11.48 11.37 10.94 10.88 5.95   HGB 8.9* 8.8* 8.7* 8.2* 8.6*   HCT 27.8* 27.9* 27.1* 25.7* 25.9*    187 174 171 141   MCV 77.0* 76.4* 76.1* 76.9* 76.0*     BMP:   Lab Results   Component Value Date     (L) 05/23/2025    K 4.4 05/23/2025    CL 92 (L) 05/23/2025    CO2 19 (L) 05/23/2025    BUN 36.6 (H) 05/23/2025    CREATININE 1.45 (H) 05/23/2025     05/23/2025    CALCIUM 9.3 05/23/2025    MG 1.90 05/20/2025    PHOS 2.1 (L) 05/20/2025     LFTs:   Lab Results   Component Value Date    PROT 7.6 05/23/2025    ALBUMIN 3.6 05/23/2025    BILITOT 5.4 (H) 05/23/2025    AST 32 05/23/2025    ALKPHOS 242 (H) 05/23/2025    ALT 37 05/23/2025     Coags:   Lab Results   Component Value Date    INR 3.8 (H) 03/07/2025    PROTIME 38.1 (H) 03/07/2025     FLP:   Lab Results   Component Value Date    CHOL 96 08/14/2024    HDL 32 (L) 08/14/2024    TRIG 73 08/14/2024     DM:   Lab Results   Component Value Date    HGBA1C 5.0 03/18/2024    HGBA1C 4.7 11/15/2021    HGBA1C 5.7 10/07/2020    CREATININE 1.45 (H) 05/23/2025     Thyroid:   Lab Results   Component Value Date    TSH 1.097 03/18/2024      Anemia:    Lab Results   Component Value Date    IRON 22 (L) 04/22/2025    TIBC 297 04/22/2025    FERRITIN 51.82 04/22/2025       Lab Results   Component Value Date    VQITRZSL64 >2,000 (H) 04/22/2025       Lab Results   Component Value Date    FOLATE 18.5 04/22/2025        Cardiac:   Lab Results   Component Value Date    TROPONINI 0.018 05/13/2025    BNP 2,615.6 (H) 05/23/2025     Urinalysis:   Lab Results   Component Value Date    LABURIN >/= 100,000 colonies/ml Escherichia coli ESBL (A) 04/21/2025    COLORU Yellow 05/23/2025    SPECGRAV 1.015 05/15/2024    NITRITE Negative 05/23/2025    KETONESU neg 05/15/2024    UROBILINOGEN 1+ (A) 05/23/2025    WBCUA 21-50 (A) 05/23/2025       Trended Lab Data:  Recent Labs   Lab 05/19/25  0510 05/20/25  0326 05/23/25 1920   WBC 10.94 10.88 5.95   HGB 8.7* 8.2* 8.6*   HCT 27.1* 25.7* 25.9*    171 141   MCV 76.1* 76.9* 76.0*   RDW 25.4* 25.9* 25.0*   * 131* 129*   K 4.8 4.6 4.4   CL 99 99 92*   CO2 21* 23 19*   BUN 30.1* 29.7* 36.6*   CREATININE 1.02 1.13 1.45*   * 195* 111   PROT  --   --  7.6   ALBUMIN  --   --  3.6   BILITOT  --   --  5.4*   AST  --   --  32   ALKPHOS  --   --  242*   ALT  --   --  37       Trended Cardiac Data:  Recent Labs   Lab 05/23/25 1920   BNP 2,615.6*       Microbiology Data:  Microbiology Results (last 7 days)       Procedure Component Value Units Date/Time    Blood Culture #2 **CANNOT BE ORDERED STAT** [[id number]] Collected: 05/23/25 2136    Order Status: Sent Specimen: Blood Updated: 05/23/25 2146    Blood Culture #1 **CANNOT BE ORDERED STAT** [[id number]] Collected: 05/23/25 2127    Order Status: Sent Specimen: Blood from Hand, Right Updated: 05/23/25 2146    Urine culture [[id number]] Collected: 05/23/25 1955    Order Status: Sent Specimen: Urine Updated: 05/23/25 2028             Imaging      Imaging Results              X-Ray Chest 1 View (Final result)  Result time 05/23/25 19:54:58      Final result by Ramana Mcleod MD  (05/23/25 19:54:58)                   Narrative:    EXAMINATION  XR CHEST 1 VIEW    CLINICAL HISTORY  weakness;    TECHNIQUE  A total of 1 frontal image(s) submitted of the chest.    COMPARISON  4 May 2025    FINDINGS  Lines/tubes/devices: ECG leads overlie the imaged region.  Right PICC no longer visualized.    There is similar enlargement the cardiac silhouette, central vascular congestion, and widespread lung interstitial changes.  The trachea is midline. No new or worsening consolidation is developed in the interval.  There is no large pleural effusion or convincing pneumothorax.    Regional osseous structures and extrathoracic soft tissues are similar.    IMPRESSION  No acute process or other adverse interval change.  Chronic secondary findings discussed above.      Electronically signed by: Ramana Mcleod  Date:    05/23/2025  Time:    19:54                                     Assessment and Plan       UTI  -afebrile, no leukocytosis  -lactate 2.8; f/u repeat  -UA with trace protein, 500 LE, 21-50 WBC, occasional bacteria, 3-5 hyaline casts  -pending blood and urine cultures  -continue Rocephin; de-escalate abx as appropriate    ELIZABETH  Hyponatremia  Anion gap metabolic acidosis  -Na 129, Cl 92, bicarb 19, anion gap 18, BUN/Cr 36.6/1.45  -suspect anion gap in setting of elevated lactic acid  -hyponatremia potentially multifactorial with chronicity noted on prior admissions. Appears asymptomatic at this time.   -potential contributions: hypervolemic hyponatremia d/t CHF vs hypovolemia-related d/t poor oral intake/diuretic use vs adrenal insufficiency  -prerenal ELIZABETH possibly 2/2 poor intake  -resume oral intake. S/p 1L LR in ED. Replete volume judiciously in setting of HFrEF.  -continue electrolyte goals: K> 4 Mag > 3 Phos > 2     Troponinemia  Chronic persistent A-fib   Nonobstructive CAD (per 3/[facility]), NICM  HFrEF   HTN  pHTN  PVD   HLD  -exam with 1+ pitting edema to BLE; patient states this is baseline and  discharge summary from 5/20 documents similar swelling. No other overt signs of fluid overload.  -troponin 0.057. EKG with afib (rate 98) with PVCs; T wave inversions in 1 and aVL (noted on priors); ST abnormalities in V1 and V3 (noted on priors). No chest pain or dynamic EKG changes. Will continue to trend troponin and EKG q6h.  -mildly elevated troponin suspected d/t chronic myocardial strain in setting of HFrEF in addition to impaired renal clearance with ELIZABETH. Also potentially deman ischemia in setting of infection (UTI).   -BNP 2615 (down from 3765 on 5/13)  -CXR demonstrating cardiomegaly, central vascular congestion and chronic interstitial changes; on my read vascular congestion improved from recent hospitalization.   -TTE 4/2025: EF 22% with PASP 59 mm Hg and grade 3 diastolic dysfunction   -clinically does not appear to be in acute exacerbation at this time. Resume home Bumex PO 1mg qd. Will hold IV diuretics at this time unless signs of decompensation develop.   -daily weights, strict I/O  -documented inability to tolerate GDMT  -continue anticoagulation for CVA risk reduction in setting of atrial fibrillation.    -continue SAPT and high-intensity statin therapy  -cardiac monitoring; defibrillator leads attached due to bedbug infestation of LifeVest wearable defibrillator    Adrenal insufficiency  -continue prednisone taper (initiated during recent hospitalization)    COPD  -respiratory status is stable at this time   -duonebs q4h PRN    Anxiety/insomnia   -continue home Quetiapine 100 mg qhs,sertraline 100mg qd, and gabapentin 300mg TID            CODE STATUS: full code  Access: PIV  Antibiotics: Rocephin  Diet: heart healthy  DVT Prophylaxis: Xarelto  GI Prophylaxis: none  Fluids: no IVF, oral intake      Disposition: admitted with UTI and ELIZABETH. Complex medical history. Monitoring labs. On abx. CM consulted to facilitate nursing facility placement. Discharge pending course.        Nani Lamar,  MD [facility], HO[facility]         [1]   Social History  Tobacco Use    Smoking status: Every Day     Current packs/day: 0.50     Average packs/day: 0.8 packs/day for 50.4 years (40.9 ttl pk-yrs)     Types: Cigarettes     Start date: 1975     Passive exposure: Current    Smokeless tobacco: Never    Tobacco comments:     States smoke 2-3 cigarettes per day   Substance Use Topics    Alcohol use: Yes     Alcohol/week: 3.0 standard drinks of alcohol     Types: 3 Cans of beer per week     Comment: socially    Drug use: Not Currently     Frequency: 1.0 times per week     Types: Marijuana     Comment: no use in over 1 year

## 2025-05-24 NOTE — PLAN OF CARE
Problem: Adult Inpatient Plan of Care  Goal: Plan of Care Review  Outcome: Progressing  Goal: Patient-Specific Goal (Individualized)  Outcome: Progressing  Goal: Absence of Hospital-Acquired Illness or Injury  Outcome: Progressing  Goal: Optimal Comfort and Wellbeing  Outcome: Progressing  Goal: Readiness for Transition of Care  Outcome: Progressing     Problem: Heart Failure  Goal: Optimal Coping  Outcome: Progressing  Goal: Optimal Cardiac Output  Outcome: Progressing  Goal: Stable Heart Rate and Rhythm  Outcome: Progressing  Goal: Optimal Functional Ability  Outcome: Progressing  Goal: Fluid and Electrolyte Balance  Outcome: Progressing  Goal: Improved Oral Intake  Outcome: Progressing  Goal: Effective Oxygenation and Ventilation  Outcome: Progressing  Goal: Effective Breathing Pattern During Sleep  Outcome: Progressing     Problem: Fall Injury Risk  Goal: Absence of Fall and Fall-Related Injury  Outcome: Progressing     Problem: Acute Kidney Injury/Impairment  Goal: Fluid and Electrolyte Balance  Outcome: Progressing  Goal: Improved Oral Intake  Outcome: Progressing  Goal: Effective Renal Function  Outcome: Progressing     Problem: Infection  Goal: Absence of Infection Signs and Symptoms  Outcome: Progressing

## 2025-05-25 LAB
ALBUMIN SERPL-MCNC: 3.4 G/DL (ref 3.4–4.8)
ALBUMIN/GLOB SERPL: 0.9 RATIO (ref 1.1–2)
ALP SERPL-CCNC: 284 UNIT/L (ref 40–150)
ALT SERPL-CCNC: 45 UNIT/L (ref 0–55)
ANION GAP SERPL CALC-SCNC: 14 MEQ/L
AST SERPL-CCNC: 44 UNIT/L (ref 11–45)
BASOPHILS # BLD AUTO: 0 X10(3)/MCL
BASOPHILS NFR BLD AUTO: 0 %
BILIRUB SERPL-MCNC: 4.3 MG/DL
BUN SERPL-MCNC: 46.3 MG/DL (ref 8.4–25.7)
CALCIUM SERPL-MCNC: 9.3 MG/DL (ref 8.8–10)
CHLORIDE SERPL-SCNC: 96 MMOL/L (ref 98–107)
CO2 SERPL-SCNC: 19 MMOL/L (ref 23–31)
CREAT SERPL-MCNC: 1.38 MG/DL (ref 0.72–1.25)
CREAT/UREA NIT SERPL: 34
EOSINOPHIL # BLD AUTO: 0 X10(3)/MCL (ref 0–0.9)
EOSINOPHIL NFR BLD AUTO: 0 %
ERYTHROCYTE [DISTWIDTH] IN BLOOD BY AUTOMATED COUNT: 25.7 % (ref 11.5–17)
GFR SERPLBLD CREATININE-BSD FMLA CKD-EPI: 56 ML/MIN/1.73/M2
GLOBULIN SER-MCNC: 3.9 GM/DL (ref 2.4–3.5)
GLUCOSE SERPL-MCNC: 148 MG/DL (ref 82–115)
HCT VFR BLD AUTO: 24.9 % (ref 42–52)
HGB BLD-MCNC: 8.2 G/DL (ref 14–18)
IMM GRANULOCYTES # BLD AUTO: 0.01 X10(3)/MCL (ref 0–0.04)
IMM GRANULOCYTES NFR BLD AUTO: 0.2 %
LYMPHOCYTES # BLD AUTO: 0.3 X10(3)/MCL (ref 0.6–4.6)
LYMPHOCYTES NFR BLD AUTO: 5.6 %
MAGNESIUM SERPL-MCNC: 2.2 MG/DL (ref 1.6–2.6)
MCH RBC QN AUTO: 25.1 PG (ref 27–31)
MCHC RBC AUTO-ENTMCNC: 32.9 G/DL (ref 33–36)
MCV RBC AUTO: 76.1 FL (ref 80–94)
MONOCYTES # BLD AUTO: 0.3 X10(3)/MCL (ref 0.1–1.3)
MONOCYTES NFR BLD AUTO: 5.6 %
NEUTROPHILS # BLD AUTO: 4.75 X10(3)/MCL (ref 2.1–9.2)
NEUTROPHILS NFR BLD AUTO: 88.6 %
NRBC BLD AUTO-RTO: 0 %
PHOSPHATE SERPL-MCNC: 4.2 MG/DL (ref 2.3–4.7)
PLATELET # BLD AUTO: 124 X10(3)/MCL (ref 130–400)
PLATELETS.RETICULATED NFR BLD AUTO: 3.7 % (ref 0.9–11.2)
PMV BLD AUTO: ABNORMAL FL
POTASSIUM SERPL-SCNC: 4.5 MMOL/L (ref 3.5–5.1)
PROT SERPL-MCNC: 7.3 GM/DL (ref 5.8–7.6)
RBC # BLD AUTO: 3.27 X10(6)/MCL (ref 4.7–6.1)
SODIUM SERPL-SCNC: 129 MMOL/L (ref 136–145)
WBC # BLD AUTO: 5.36 X10(3)/MCL (ref 4.5–11.5)

## 2025-05-25 PROCEDURE — 36415 COLL VENOUS BLD VENIPUNCTURE: CPT

## 2025-05-25 PROCEDURE — 83735 ASSAY OF MAGNESIUM: CPT

## 2025-05-25 PROCEDURE — 80053 COMPREHEN METABOLIC PANEL: CPT

## 2025-05-25 PROCEDURE — 94640 AIRWAY INHALATION TREATMENT: CPT

## 2025-05-25 PROCEDURE — 94760 N-INVAS EAR/PLS OXIMETRY 1: CPT

## 2025-05-25 PROCEDURE — 84100 ASSAY OF PHOSPHORUS: CPT

## 2025-05-25 PROCEDURE — 85025 COMPLETE CBC W/AUTO DIFF WBC: CPT

## 2025-05-25 PROCEDURE — 11000001 HC ACUTE MED/SURG PRIVATE ROOM

## 2025-05-25 PROCEDURE — 25000242 PHARM REV CODE 250 ALT 637 W/ HCPCS

## 2025-05-25 PROCEDURE — 63600175 PHARM REV CODE 636 W HCPCS

## 2025-05-25 PROCEDURE — 99900035 HC TECH TIME PER 15 MIN (STAT)

## 2025-05-25 PROCEDURE — 25000003 PHARM REV CODE 250

## 2025-05-25 RX ADMIN — GABAPENTIN 300 MG: 300 CAPSULE ORAL at 08:05

## 2025-05-25 RX ADMIN — CLOPIDOGREL BISULFATE 75 MG: 75 TABLET, FILM COATED ORAL at 08:05

## 2025-05-25 RX ADMIN — Medication 1000 UNITS: at 08:05

## 2025-05-25 RX ADMIN — QUETIAPINE FUMARATE 100 MG: 100 TABLET ORAL at 08:05

## 2025-05-25 RX ADMIN — GABAPENTIN 300 MG: 300 CAPSULE ORAL at 03:05

## 2025-05-25 RX ADMIN — PREDNISONE 20 MG: 20 TABLET ORAL at 08:05

## 2025-05-25 RX ADMIN — IPRATROPIUM BROMIDE AND ALBUTEROL SULFATE 3 ML: 2.5; .5 SOLUTION RESPIRATORY (INHALATION) at 08:05

## 2025-05-25 RX ADMIN — IPRATROPIUM BROMIDE AND ALBUTEROL SULFATE 3 ML: 2.5; .5 SOLUTION RESPIRATORY (INHALATION) at 07:05

## 2025-05-25 RX ADMIN — PANTOPRAZOLE SODIUM 40 MG: 40 TABLET, DELAYED RELEASE ORAL at 08:05

## 2025-05-25 RX ADMIN — SERTRALINE HYDROCHLORIDE 100 MG: 50 TABLET ORAL at 08:05

## 2025-05-25 RX ADMIN — ATORVASTATIN CALCIUM 80 MG: 40 TABLET, FILM COATED ORAL at 08:05

## 2025-05-25 RX ADMIN — RIVAROXABAN 20 MG: 10 TABLET, FILM COATED ORAL at 04:05

## 2025-05-25 NOTE — PROGRESS NOTES
[facility] Medicine Wards   Progress Note      Resident Team: [facility] Family Medicine List 2  Attending Physician: Vignesh Guaman*  Resident: Owen Dewey MD  Intern: Roldan Cisneros MD   Hospital Length of Stay: 1 days    Subjective   Brief HPI:  Ran Reyes Jr. is a 69 y.o. 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 Of note, discharged from this facility on 5/20/25 after admission for cardiogenic/septic shock. Adrenal insufficiency noted during that admission; discharged on prednisone taper. Documented inability to tolerate GDMT. Discharged with life vest. According to documentation, was discharged with . Today, patient states he lives alone and family members have not been coming to help regularly. States he has not eaten since yesterday afternoon as no family member came to bring him food. He did not take home meds today. He is requesting nursing home placement as he is no longer able to care for himself at home. Denies chest pain, shortness of breath or palpitations. No fever, chills, nausea, vomiting, diarrhea, or urinary symptoms.     Interval History:   PT had an asymptomatic 6 beat run of Vtac this morning. No other acute events overnight. Vital signs remained stable. Pt admits he has no acute complaints at this time. Currently pending Nursing home placement.      Review of Systems:  As stated above     Objective     Vital Signs (Most Recent):  Temp: 97.9 °F (36.6 °C) (05/25/25 0426)  Pulse: 82 (05/25/25 0426)  Resp: 18 (05/25/25 0426)  BP: (!) 119/90 (05/25/25 0426)  SpO2: 100 % (05/25/25 0426) Vital Signs (24h Range):  Temp:   [97.7 °F (36.5 °C)-98.4 °F (36.9 °C)] 97.9 °F (36.6 °C)  Pulse:  [] 82  Resp:  [15-20] 18  SpO2:  [95 %-100 %] 100 %  BP: (108-119)/(76-90) 119/90     Physical Examination:  Physical Exam  Vitals and nursing note reviewed.   Constitutional:       General: He is not in acute distress.  Cardiovascular:      Rate and Rhythm: Normal rate and regular rhythm.      Heart sounds: Murmur heard.   Pulmonary:      Effort: Pulmonary effort is normal.      Breath sounds: No wheezing (Left chest).   Abdominal:      Tenderness: There is no abdominal tenderness (Diffused (L>R)). There is no guarding.   Musculoskeletal:      Right lower leg: Edema (1+) present.      Left lower leg: Edema (1+) present.      Comments: Baseline edema present in BLE   Neurological:      Mental Status: He is alert.     Laboratory:  Most Recent Data:  CBC:   Lab Results   Component Value Date    WBC 5.36 05/25/2025    HGB 8.2 (L) 05/25/2025    HCT 24.9 (L) 05/25/2025     (L) 05/25/2025    MCV 76.1 (L) 05/25/2025    RDW 25.7 (H) 05/25/2025     WBC Differential:   Recent Labs   Lab 05/19/25  0510 05/20/25  0326 05/23/25  1920 05/24/25  0328 05/25/25  0337   WBC 10.94 10.88 5.95 5.83 5.36   HGB 8.7* 8.2* 8.6* 8.2* 8.2*   HCT 27.1* 25.7* 25.9* 24.6* 24.9*    171 141 122* 124*   MCV 76.1* 76.9* 76.0* 75.7* 76.1*     BMP:   Lab Results   Component Value Date     (L) 05/25/2025    K 4.5 05/25/2025    CL 96 (L) 05/25/2025    CO2 19 (L) 05/25/2025    BUN 46.3 (H) 05/25/2025    CREATININE 1.38 (H) 05/25/2025     (H) 05/25/2025    CALCIUM 9.3 05/25/2025    MG 2.20 05/25/2025    PHOS 4.2 05/25/2025     LFTs:   Lab Results   Component Value Date    PROT 7.3 05/25/2025    ALBUMIN 3.4 05/25/2025    BILITOT 4.3 (H) 05/25/2025    AST 44 05/25/2025    ALKPHOS 284 (H) 05/25/2025    ALT 45 05/25/2025     Coags:   Lab Results   Component Value Date    INR 3.8 (H) 03/07/2025    PROTIME 38.1 (H) 03/07/2025     FLP:   Lab Results   Component  Value Date    CHOL 96 08/14/2024    HDL 32 (L) 08/14/2024    TRIG 73 08/14/2024     DM:   Lab Results   Component Value Date    HGBA1C 5.0 03/18/2024    HGBA1C 4.7 11/15/2021    HGBA1C 5.7 10/07/2020    CREATININE 1.38 (H) 05/25/2025     Thyroid:   Lab Results   Component Value Date    TSH 1.097 03/18/2024      Anemia:   Lab Results   Component Value Date    IRON 22 (L) 04/22/2025    TIBC 297 04/22/2025    FERRITIN 51.82 04/22/2025       Lab Results   Component Value Date    DDNMOBFI99 >2,000 (H) 04/22/2025       Lab Results   Component Value Date    FOLATE 18.5 04/22/2025        Cardiac:   Lab Results   Component Value Date    TROPONINI 0.048 (H) 05/24/2025    BNP 2,615.6 (H) 05/23/2025         Microbiology Data:  Microbiology Results (last 7 days)       Procedure Component Value Units Date/Time    Blood Culture #2 **CANNOT BE ORDERED STAT** [[id number]] Collected: 05/23/25 2136    Order Status: Resulted Specimen: Blood Updated: 05/23/25 2146    Blood Culture #1 **CANNOT BE ORDERED STAT** [[id number]] Collected: 05/23/25 2127    Order Status: Resulted Specimen: Blood from Hand, Right Updated: 05/23/25 2146    Urine culture [[id number]] Collected: 05/23/25 1955    Order Status: Sent Specimen: Urine Updated: 05/23/25 2028            Current Medications:     Infusions:       Scheduled:   atorvastatin  80 mg Oral Daily    [START ON 5/26/2025] bumetanide  1 mg Oral Every other day    cefTRIAXone (Rocephin) IV (PEDS and ADULTS)  1 g Intravenous Q24H    clopidogreL  75 mg Oral Daily    gabapentin  300 mg Oral TID    pantoprazole  40 mg Oral Daily    predniSONE  20 mg Oral BID    Followed by    [START ON 5/26/2025] predniSONE  15 mg Oral BID    Followed by    [START ON 6/2/2025] predniSONE  10 mg Oral BID    Followed by    [START ON 6/9/2025] predniSONE  5 mg Oral BID    QUEtiapine  100 mg Oral QHS    rivaroxaban  20 mg Oral Daily    sertraline  100 mg Oral Daily    vitamin D  1,000 Units Oral Daily        PRN:    Current  Facility-Administered Medications:     albuterol-ipratropium, 3 mL, Nebulization, Q4H PRN    dextrose 50%, 12.5 g, Intravenous, PRN    dextrose 50%, 25 g, Intravenous, PRN    glucagon (human recombinant), 1 mg, Intramuscular, PRN    glucose, 16 g, Oral, PRN    glucose, 24 g, Oral, PRN    insulin aspart U-100, 0-5 Units, Subcutaneous, QID (AC + HS) PRN    melatonin, 6 mg, Oral, Nightly PRN    naloxone, 0.02 mg, Intravenous, PRN    sodium chloride 0.9%, 10 mL, Intravenous, Q12H PRN        Intake/Output Summary (Last 24 hours) at 5/25/2025 0736  Last data filed at 5/24/2025 1541  Gross per 24 hour   Intake 576 ml   Output 900 ml   Net -324 ml       Lines/Drains/Airways       Peripheral Intravenous Line  Duration                  Peripheral IV - Single Lumen 05/23/25 1838 20 G Left;Posterior Hand 1 day                    Assessment and Plan      UTI  -afebrile, no leukocytosis, and asymptomatic  -lactate 2.8; f/u trended down  -UA with trace protein, 500 LE, 21-50 WBC, occasional bacteria, 3-5 hyaline casts  -urine cx with ecoli  -give that pt is asymptomatic will d/c rocephin today     ELIZABETH  Hyponatremia  Anion gap metabolic acidosis  -suspect initial anion gap in setting of elevated lactic acid  -hyponatremia potentially multifactorial with chronicity noted on prior admissions. Appears asymptomatic at this time.   -potential contributions: hypervolemic hyponatremia d/t CHF vs hypovolemia-related d/t poor oral intake/diuretic use vs adrenal insufficiency  -prerenal ELIZABETH possibly 2/2 poor intake  -resume oral intake. S/p 1L LR in ED. Replete volume judiciously in setting of HFrEF.  -continue electrolyte goals: K> 4 Mag > 3 Phos > 2      Troponinemia  Chronic persistent A-fib   Nonobstructive CAD (per 3/2024 [facility]), NICM  HFrEF   HTN  pHTN  PVD   HLD  -exam with 1+ pitting edema to BLE; patient states this is baseline and discharge summary from 5/20 documents similar swelling. No other overt signs of fluid  overload.  -troponin 0.057. EKG with afib (rate 98) with PVCs; T wave inversions in 1 and aVL (noted on priors); ST abnormalities in V1 and V3 (noted on priors). No chest pain or dynamic EKG changes. F/U trops continued with downward trend  -BNP 2615 (down from 3765 on 5/13)  -CXR demonstrating cardiomegaly, central vascular congestion and chronic interstitial changes; on my read vascular congestion improved from recent hospitalization.   -TTE 4/2025: EF 22% with PASP 59 mm Hg and grade 3 diastolic dysfunction   -Continue home Bumex PO 1mg qd. Will hold IV diuretics at this time unless signs of decompensation develop.   -daily weights, strict I/O  -documented inability to tolerate GDMT  -continue anticoagulation for CVA risk reduction in setting of atrial fibrillation.    -continue SAPT and high-intensity statin therapy  -cardiac monitoring; defibrillator leads attached due to bedbug infestation of [employer] wearable defibrillator     Adrenal insufficiency  -continue prednisone taper (initiated during recent hospitalization)     COPD  -respiratory status is stable at this time   -duonebs q4h PRN     Anxiety/insomnia   -continue home Quetiapine 100 mg qhs,sertraline 100mg qd, and gabapentin 300mg TID           CODE STATUS: full code  Access: PIV  Antibiotics: Rocephin  Diet: heart healthy  DVT Prophylaxis: Xarelto      Dispo: Pt with severe CHF and deconditioning undergoing treatment for UTI. Currently pending placement.     Owen Dewey MD  [facility] HO-III

## 2025-05-25 NOTE — NURSING
Telemetry called stating pt had  6 beat run of Vtach. Pt is asymptomatic at this time. MD Roa notified.

## 2025-05-25 NOTE — PLAN OF CARE
Problem: Adult Inpatient Plan of Care  Goal: Plan of Care Review  Outcome: Met  Goal: Patient-Specific Goal (Individualized)  Outcome: Met  Goal: Absence of Hospital-Acquired Illness or Injury  Outcome: Met  Goal: Optimal Comfort and Wellbeing  Outcome: Met  Goal: Readiness for Transition of Care  Outcome: Met     Problem: Heart Failure  Goal: Optimal Coping  Outcome: Met  Goal: Optimal Cardiac Output  Outcome: Met  Goal: Stable Heart Rate and Rhythm  Outcome: Met  Goal: Optimal Functional Ability  Outcome: Met  Goal: Fluid and Electrolyte Balance  Outcome: Met  Goal: Improved Oral Intake  Outcome: Met  Goal: Effective Oxygenation and Ventilation  Outcome: Met  Goal: Effective Breathing Pattern During Sleep  Outcome: Met     Problem: Fall Injury Risk  Goal: Absence of Fall and Fall-Related Injury  Outcome: Met     Problem: Acute Kidney Injury/Impairment  Goal: Fluid and Electrolyte Balance  Outcome: Met  Goal: Improved Oral Intake  Outcome: Met  Goal: Effective Renal Function  Outcome: Met     Problem: Infection  Goal: Absence of Infection Signs and Symptoms  Outcome: Met

## 2025-05-26 LAB
ALBUMIN SERPL-MCNC: 3.4 G/DL (ref 3.4–4.8)
ALBUMIN/GLOB SERPL: 0.9 RATIO (ref 1.1–2)
ALP SERPL-CCNC: 292 UNIT/L (ref 40–150)
ALT SERPL-CCNC: 47 UNIT/L (ref 0–55)
ANION GAP SERPL CALC-SCNC: 14 MEQ/L
AST SERPL-CCNC: 45 UNIT/L (ref 11–45)
BACTERIA UR CULT: ABNORMAL
BASOPHILS # BLD AUTO: 0 X10(3)/MCL
BASOPHILS NFR BLD AUTO: 0 %
BILIRUB SERPL-MCNC: 4.2 MG/DL
BUN SERPL-MCNC: 53.2 MG/DL (ref 8.4–25.7)
CALCIUM SERPL-MCNC: 9.2 MG/DL (ref 8.8–10)
CHLORIDE SERPL-SCNC: 94 MMOL/L (ref 98–107)
CO2 SERPL-SCNC: 19 MMOL/L (ref 23–31)
CREAT SERPL-MCNC: 1.61 MG/DL (ref 0.72–1.25)
CREAT/UREA NIT SERPL: 33
EOSINOPHIL # BLD AUTO: 0.01 X10(3)/MCL (ref 0–0.9)
EOSINOPHIL NFR BLD AUTO: 0.2 %
ERYTHROCYTE [DISTWIDTH] IN BLOOD BY AUTOMATED COUNT: 25.8 % (ref 11.5–17)
GFR SERPLBLD CREATININE-BSD FMLA CKD-EPI: 47 ML/MIN/1.73/M2
GLOBULIN SER-MCNC: 3.9 GM/DL (ref 2.4–3.5)
GLUCOSE SERPL-MCNC: 158 MG/DL (ref 82–115)
HCT VFR BLD AUTO: 25.1 % (ref 42–52)
HGB BLD-MCNC: 8.1 G/DL (ref 14–18)
IMM GRANULOCYTES # BLD AUTO: 0.02 X10(3)/MCL (ref 0–0.04)
IMM GRANULOCYTES NFR BLD AUTO: 0.3 %
LYMPHOCYTES # BLD AUTO: 0.37 X10(3)/MCL (ref 0.6–4.6)
LYMPHOCYTES NFR BLD AUTO: 6.2 %
MAGNESIUM SERPL-MCNC: 2.2 MG/DL (ref 1.6–2.6)
MCH RBC QN AUTO: 25.2 PG (ref 27–31)
MCHC RBC AUTO-ENTMCNC: 32.3 G/DL (ref 33–36)
MCV RBC AUTO: 78.2 FL (ref 80–94)
MONOCYTES # BLD AUTO: 0.39 X10(3)/MCL (ref 0.1–1.3)
MONOCYTES NFR BLD AUTO: 6.5 %
NEUTROPHILS # BLD AUTO: 5.2 X10(3)/MCL (ref 2.1–9.2)
NEUTROPHILS NFR BLD AUTO: 86.8 %
NRBC BLD AUTO-RTO: 0 %
OHS QRS DURATION: 92 MS
OHS QRS DURATION: 92 MS
OHS QRS DURATION: 94 MS
OHS QTC CALCULATION: 472 MS
OHS QTC CALCULATION: 477 MS
OHS QTC CALCULATION: 492 MS
PHOSPHATE SERPL-MCNC: 4.5 MG/DL (ref 2.3–4.7)
PLATELET # BLD AUTO: 100 X10(3)/MCL (ref 130–400)
PLATELETS.RETICULATED NFR BLD AUTO: 4.7 % (ref 0.9–11.2)
PMV BLD AUTO: ABNORMAL FL
POTASSIUM SERPL-SCNC: 4.9 MMOL/L (ref 3.5–5.1)
PROT SERPL-MCNC: 7.3 GM/DL (ref 5.8–7.6)
RBC # BLD AUTO: 3.21 X10(6)/MCL (ref 4.7–6.1)
SODIUM SERPL-SCNC: 127 MMOL/L (ref 136–145)
WBC # BLD AUTO: 5.99 X10(3)/MCL (ref 4.5–11.5)

## 2025-05-26 PROCEDURE — 99900035 HC TECH TIME PER 15 MIN (STAT)

## 2025-05-26 PROCEDURE — 94640 AIRWAY INHALATION TREATMENT: CPT

## 2025-05-26 PROCEDURE — 25000003 PHARM REV CODE 250

## 2025-05-26 PROCEDURE — 80053 COMPREHEN METABOLIC PANEL: CPT

## 2025-05-26 PROCEDURE — 94761 N-INVAS EAR/PLS OXIMETRY MLT: CPT

## 2025-05-26 PROCEDURE — 25000242 PHARM REV CODE 250 ALT 637 W/ HCPCS

## 2025-05-26 PROCEDURE — 83735 ASSAY OF MAGNESIUM: CPT

## 2025-05-26 PROCEDURE — 85025 COMPLETE CBC W/AUTO DIFF WBC: CPT

## 2025-05-26 PROCEDURE — 63600175 PHARM REV CODE 636 W HCPCS

## 2025-05-26 PROCEDURE — 84100 ASSAY OF PHOSPHORUS: CPT

## 2025-05-26 PROCEDURE — 94760 N-INVAS EAR/PLS OXIMETRY 1: CPT

## 2025-05-26 PROCEDURE — 97166 OT EVAL MOD COMPLEX 45 MIN: CPT

## 2025-05-26 PROCEDURE — 97162 PT EVAL MOD COMPLEX 30 MIN: CPT

## 2025-05-26 PROCEDURE — 36415 COLL VENOUS BLD VENIPUNCTURE: CPT

## 2025-05-26 PROCEDURE — 11000001 HC ACUTE MED/SURG PRIVATE ROOM

## 2025-05-26 RX ORDER — NITROFURANTOIN 25; 75 MG/1; MG/1
100 CAPSULE ORAL EVERY 12 HOURS
Status: DISCONTINUED | OUTPATIENT
Start: 2025-05-26 | End: 2025-05-31

## 2025-05-26 RX ADMIN — NITROFURANTOIN (MONOHYDRATE/MACROCRYSTALS) 100 MG: 25; 75 CAPSULE ORAL at 09:05

## 2025-05-26 RX ADMIN — NITROFURANTOIN (MONOHYDRATE/MACROCRYSTALS) 100 MG: 25; 75 CAPSULE ORAL at 12:05

## 2025-05-26 RX ADMIN — BUMETANIDE 1 MG: 1 TABLET ORAL at 09:05

## 2025-05-26 RX ADMIN — PREDNISONE 15 MG: 5 TABLET ORAL at 09:05

## 2025-05-26 RX ADMIN — GABAPENTIN 300 MG: 300 CAPSULE ORAL at 09:05

## 2025-05-26 RX ADMIN — ATORVASTATIN CALCIUM 80 MG: 40 TABLET, FILM COATED ORAL at 09:05

## 2025-05-26 RX ADMIN — IPRATROPIUM BROMIDE AND ALBUTEROL SULFATE 3 ML: 2.5; .5 SOLUTION RESPIRATORY (INHALATION) at 07:05

## 2025-05-26 RX ADMIN — SERTRALINE HYDROCHLORIDE 100 MG: 50 TABLET ORAL at 09:05

## 2025-05-26 RX ADMIN — GABAPENTIN 300 MG: 300 CAPSULE ORAL at 04:05

## 2025-05-26 RX ADMIN — CLOPIDOGREL BISULFATE 75 MG: 75 TABLET, FILM COATED ORAL at 09:05

## 2025-05-26 RX ADMIN — PANTOPRAZOLE SODIUM 40 MG: 40 TABLET, DELAYED RELEASE ORAL at 09:05

## 2025-05-26 RX ADMIN — Medication 1000 UNITS: at 09:05

## 2025-05-26 RX ADMIN — IPRATROPIUM BROMIDE AND ALBUTEROL SULFATE 3 ML: 2.5; .5 SOLUTION RESPIRATORY (INHALATION) at 11:05

## 2025-05-26 RX ADMIN — RIVAROXABAN 20 MG: 10 TABLET, FILM COATED ORAL at 04:05

## 2025-05-26 RX ADMIN — QUETIAPINE FUMARATE 100 MG: 100 TABLET ORAL at 09:05

## 2025-05-26 NOTE — PROGRESS NOTES
[facility] Progress Note     Resident Team: [facility] Medicine List 2  Attending: Merle Sun MD  Resident: Maco Bernard    Subjective:      Brief HPI:  Ran Reyes Jr. is a 69 y.o. male with PMH significant for tobacco dependence, COPD, HTN, pHTN, HLD, chronic persistent atrial fibrillation on Xarelto, nonobstructive CAD, NICM, HFrEF (EF 22%), PVD s/p stenting to superficial femoral artery and right tibial artery, Claudine's gangrene (2024), primary open-angle glaucoma presented to [facility] ED on 2025 with a primary complaint of generalized weakness and fatigue and edema to BLE x 3 days. Of note, discharged from this facility on 25 after admission for cardiogenic/septic shock. Adrenal insufficiency noted during that admission; discharged on prednisone taper. Documented inability to tolerate GDMT. Discharged with life vest. According to documentation, was discharged with HH. Today, patient states he lives alone and family members have not been coming to help regularly. States he has not eaten since yesterday afternoon as no family member came to bring him food. He did not take home meds today. He is requesting nursing home placement as he is no longer able to care for himself at home. Denies chest pain, shortness of breath or palpitations. No fever, chills, nausea, vomiting, diarrhea, or urinary symptoms.     Interval History:   Nurse reports of no overnight events. This AM, patient reports doing well overall. He has been eating/drinking okay. Denies any SOB at rest or chest pain, but still reports generalized weakness. He also c/o difficulty urinating that started this morning. Will plan to bladder scan. He however denies any dysuria, fever, chills, increased urinary frequency. Pending Nursing home placement    Review of Systems:  As stated above.     Objective:     Last 24 Hour Vital Signs:  BP  Min: 111/73  Max: 129/72  Temp  Av.8 °F (36.6 °C)  Min: 97.7 °F (36.5 °C)  Max: 98 °F (36.7 °C)  Pulse   Av.1  Min: 50  Max: 96  Resp  Av.9  Min: 17  Max: 20  SpO2  Av.8 %  Min: 93 %  Max: 100 %  Weight  Av.1 kg (209 lb 11.2 oz)  Min: 95.1 kg (209 lb 11.2 oz)  Max: 95.1 kg (209 lb 11.2 oz)  I/O last 3 completed shifts:  In: 340 [P.O.:340]  Out: 1000 [Urine:1000]    Physical Examination:  Physical Exam  Vitals reviewed.   Constitutional:       General: He is not in acute distress.     Appearance: Normal appearance. He is not ill-appearing, toxic-appearing or diaphoretic.   Cardiovascular:      Rate and Rhythm: Normal rate.      Heart sounds: Murmur heard.   Pulmonary:      Effort: Pulmonary effort is normal. No respiratory distress.      Breath sounds: Normal breath sounds. No stridor. No wheezing, rhonchi or rales.   Abdominal:      General: Bowel sounds are normal. There is no distension.      Palpations: Abdomen is soft.      Tenderness: There is no abdominal tenderness. There is no guarding or rebound.   Musculoskeletal:      Right lower leg: Edema present.      Left lower leg: Edema present.      Comments: 2 + pitting edema in bilateral lower extremities   Skin:     General: Skin is warm.   Neurological:      General: No focal deficit present.      Mental Status: He is alert and oriented to person, place, and time.         Laboratory:  Most Recent Data:  CBC:   Lab Results   Component Value Date    WBC 5.99 2025    HGB 8.1 (L) 2025    HCT 25.1 (L) 2025     (L) 2025    MCV 78.2 (L) 2025    RDW 25.8 (H) 2025     WBC Differential:   Recent Labs   Lab 25  0326 25  1920 25  0328 25  0337 25  0346   WBC 10.88 5.95 5.83 5.36 5.99   HGB 8.2* 8.6* 8.2* 8.2* 8.1*   HCT 25.7* 25.9* 24.6* 24.9* 25.1*    141 122* 124* 100*   MCV 76.9* 76.0* 75.7* 76.1* 78.2*     BMP:   Lab Results   Component Value Date     (L) 2025    K 4.9 2025    CL 94 (L) 2025    CO2 19 (L) 2025    BUN 53.2 (H) 2025     CREATININE 1.61 (H) 05/26/2025     (H) 05/26/2025    CALCIUM 9.2 05/26/2025    MG 2.20 05/26/2025    PHOS 4.5 05/26/2025     LFTs:   Lab Results   Component Value Date    PROT 7.3 05/26/2025    ALBUMIN 3.4 05/26/2025    BILITOT 4.2 (H) 05/26/2025    AST 45 05/26/2025    ALKPHOS 292 (H) 05/26/2025    ALT 47 05/26/2025     Coags:   Lab Results   Component Value Date    INR 3.8 (H) 03/07/2025    PROTIME 38.1 (H) 03/07/2025     FLP:   Lab Results   Component Value Date    CHOL 96 08/14/2024    HDL 32 (L) 08/14/2024    TRIG 73 08/14/2024     DM:   Lab Results   Component Value Date    HGBA1C 5.0 03/18/2024    HGBA1C 4.7 11/15/2021    HGBA1C 5.7 10/07/2020    CREATININE 1.61 (H) 05/26/2025     Thyroid:   Lab Results   Component Value Date    TSH 1.097 03/18/2024      Anemia:   Lab Results   Component Value Date    IRON 22 (L) 04/22/2025    TIBC 297 04/22/2025    FERRITIN 51.82 04/22/2025       Lab Results   Component Value Date    [id number] >2,000 (H) 04/22/2025       Lab Results   Component Value Date    FOLATE 18.5 04/22/2025        Cardiac:   Lab Results   Component Value Date    TROPONINI 0.048 (H) 05/24/2025    BNP 2,615.6 (H) 05/23/2025         Microbiology Data:  Microbiology Results (last 7 days)       Procedure Component Value Units Date/Time    Blood Culture #1 **CANNOT BE ORDERED STAT** [[id number]]  (Normal) Collected: 05/23/25 2127    Order Status: Completed Specimen: Blood from Hand, Right Updated: 05/25/25 1000     Blood Culture No Growth At 24 Hours    Blood Culture #2 **CANNOT BE ORDERED STAT** [[id number]]  (Normal) Collected: 05/23/25 2136    Order Status: Completed Specimen: Blood Updated: 05/25/25 1000     Blood Culture No Growth At 24 Hours    Urine culture [[id number]]  (Abnormal) Collected: 05/23/25 1955    Order Status: Completed Specimen: Urine Updated: 05/25/25 0812     Urine Culture >/= 100,000 colonies/ml Gram-negative Rods             Radiology:  Imaging Results              X-Ray  Chest 1 View (Final result)  Result time 05/23/25 19:54:58      Final result by Ramana Mcleod MD (05/23/25 19:54:58)                   Narrative:    EXAMINATION  XR CHEST 1 VIEW    CLINICAL HISTORY  weakness;    TECHNIQUE  A total of 1 frontal image(s) submitted of the chest.    COMPARISON  4 May 2025    FINDINGS  Lines/tubes/devices: ECG leads overlie the imaged region.  Right PICC no longer visualized.    There is similar enlargement the cardiac silhouette, central vascular congestion, and widespread lung interstitial changes.  The trachea is midline. No new or worsening consolidation is developed in the interval.  There is no large pleural effusion or convincing pneumothorax.    Regional osseous structures and extrathoracic soft tissues are similar.    IMPRESSION  No acute process or other adverse interval change.  Chronic secondary findings discussed above.      Electronically signed by: Ramana Mcleod  Date:    05/23/2025  Time:    19:54                                    Current Medications:     Infusions:       Scheduled:   atorvastatin  80 mg Oral Daily    bumetanide  1 mg Oral Every other day    clopidogreL  75 mg Oral Daily    gabapentin  300 mg Oral TID    pantoprazole  40 mg Oral Daily    predniSONE  15 mg Oral BID    Followed by    [START ON 6/2/2025] predniSONE  10 mg Oral BID    Followed by    [START ON 6/9/2025] predniSONE  5 mg Oral BID    QUEtiapine  100 mg Oral QHS    rivaroxaban  20 mg Oral Daily    sertraline  100 mg Oral Daily    vitamin D  1,000 Units Oral Daily        PRN:    Current Facility-Administered Medications:     albuterol-ipratropium, 3 mL, Nebulization, Q4H PRN    dextrose 50%, 12.5 g, Intravenous, PRN    dextrose 50%, 25 g, Intravenous, PRN    glucagon (human recombinant), 1 mg, Intramuscular, PRN    glucose, 16 g, Oral, PRN    glucose, 24 g, Oral, PRN    insulin aspart U-100, 0-5 Units, Subcutaneous, QID (AC + HS) PRN    melatonin, 6 mg, Oral, Nightly PRN    naloxone, 0.02 mg,  Intravenous, PRN    sodium chloride 0.9%, 10 mL, Intravenous, Q12H PRN      Assessment & Plan:     UTI   Difficulty urinating  - Afebrile, no leukocytosis, and asymptomatic  - Lactate 2.8; f/u trended down  - Urine cx grew ecoli ESBL. Upon chart review, patient received Rocephin x 2 doses. However, abx was discontinued as patient was asymptomatic.  - Patient c/o difficulty urinating this AM. Will do bladder scan. Macrobid initiated based on urine cx sensitivities. Consider escalating to Merem if symptoms not controlled.       ELIZABETH  Hyponatremia  Anion gap metabolic acidosis  - Suspect initial anion gap in setting of elevated lactic acid  - Hyponatremia potentially multifactorial with chronicity noted on prior admissions. Appears asymptomatic at this time.   - Potential contributions: hypervolemic hyponatremia d/t CHF vs hypovolemia-related d/t poor oral intake/diuretic use vs adrenal insufficiency  - Prerenal ELIZABETH possibly 2/2 poor intake  - Resume oral intake. S/p 1L LR in ED. Replete volume judiciously in setting of HFrEF.  - Continue electrolyte goals: K> 4 Mag > 3 Phos > 2        Troponinemia  Chronic persistent A-fib   Nonobstructive CAD (per 3/2024 [facility]), NICM  HFrEF   HTN  pHTN  PVD   HLD  - Troponin 0.057. EKG with afib (rate 98) with PVCs; T wave inversions in 1 and aVL (noted on priors); ST abnormalities in V1 and V3 (noted on priors). No chest pain or dynamic EKG changes. F/U trops continued with downward trend  - BNP 2615 (down from 3765 on 5/13)  - CXR demonstrating cardiomegaly, central vascular congestion and chronic interstitial changes; on my read vascular congestion improved from recent hospitalization.   -TTE 4/2025: EF 22% with PASP 59 mm Hg and grade 3 diastolic dysfunction   - Continue home Bumex PO 1mg Q every other day. Consider changing to IV and daily dosing if begins to retain more fluid.   - Daily weights, strict I/O  - Documented inability to tolerate GDMT at last admission. Consider  restarting if able to tolerate during this admission.  - Continue anticoagulation for CVA risk reduction in setting of atrial fibrillation.    - Continue SAPT and high-intensity statin therapy  - Cardiac monitoring; defibrillator leads attached due to bedbug infestation of [employer] wearable defibrillator       Adrenal insufficiency  - Continue prednisone taper (initiated during recent hospitalization)       COPD  - Duonebs q4h PRN       Anxiety/insomnia   - Continue home Quetiapine 100 mg qhs,sertraline 100mg qd, and gabapentin 300mg TID        CODE STATUS: Full Code  Access: Peripheral  Antibiotics: Macrobid  Diet: Heart healtyhy  DVT Prophylaxis: Xarelto  GI Prophylaxis: None  Fluids: none    Disposition: day 2 of admission for deconditioning. Pending Nursing home placement. Patient also with severe CHF - on home Bumex 1mg Q every other day.       Dulce Maria Bernard MD  [facility] Family Medicine Resident, -2

## 2025-05-26 NOTE — PLAN OF CARE
Problem: Physical Therapy  Goal: Physical Therapy Goal  Description: Goals to be met by: Discharge      Patient will increase functional independence with mobility by performin. Sit to stand transfer with Supervision  2. Bed to chair transfer with Supervision using Rolling Walker  3. Gait  x 130 feet with Stand-by Assistance using Rolling Walker vs Rollator.   4. Increased functional strength to 4/5 for B LE.    Outcome: Progressing

## 2025-05-26 NOTE — PROGRESS NOTES
Inpatient Nutrition Assessment    Admit Date: 5/23/2025   Total duration of encounter: 3 days   Patient Age: 69 y.o.    Nutrition Recommendation/Prescription     Continue Heart Healthy diet as ordered  Boost (provides 240 kcal, 10 g protein per serving) TID   Monitor Weight daily    Communication of Recommendations: reviewed with nurse and reviewed with patient    Nutrition Assessment     Malnutrition Assessment/Nutrition-Focused Physical Exam       Malnutrition Level: other (see comments) (Does not meet criteria) (05/26/25 1135)  Energy Intake (Malnutrition): other (see comments) (Does not meet criteria) (05/26/25 1135)  Weight Loss (Malnutrition): other (see comments) (Does not meet criteria) (05/26/25 1135)     Orbital Region (Subcutaneous Fat Loss): well nourished           Mandaeism Region (Muscle Loss): well nourished                       Fluid Accumulation (Malnutrition): moderate (05/26/25 1135)        A minimum of two characteristics is recommended for diagnosis of either severe or non-severe malnutrition.    Chart Review    Reason Seen: continuous nutrition monitoring    Malnutrition Screening Tool Results   Have you recently lost weight without trying?: No  Have you been eating poorly because of a decreased appetite?: No   MST Score: 0   Diagnosis:  UTI  ELIZABETH  Hyponatremia   Anion gap metabolic acidosis   Troponinemia  Chronic persistent A-fib   Nonobstructive CAD (per 3/2024 [facility]), NICM  HFrEF   HTN  pHTN  PVD   HLD  Adrenal insufficiency   COPD  Anxiety/insomnia    Relevant Medical History: Tobacco Dependence, COPD, HTN, pHTN, HLD, CAD, HFrEF, PVD, Claudine's gangrene    Scheduled Medications:  atorvastatin, 80 mg, Daily  bumetanide, 1 mg, Every other day  clopidogreL, 75 mg, Daily  gabapentin, 300 mg, TID  pantoprazole, 40 mg, Daily  predniSONE, 15 mg, BID   Followed by  [START ON 6/2/2025] predniSONE, 10 mg, BID   Followed by  [START ON 6/9/2025] predniSONE, 5 mg, BID  QUEtiapine, 100 mg,  QHS  rivaroxaban, 20 mg, Daily  sertraline, 100 mg, Daily  vitamin D, 1,000 Units, Daily    Continuous Infusions:   PRN Medications:  albuterol-ipratropium, 3 mL, Q4H PRN  dextrose 50%, 12.5 g, PRN  dextrose 50%, 25 g, PRN  glucagon (human recombinant), 1 mg, PRN  glucose, 16 g, PRN  glucose, 24 g, PRN  insulin aspart U-100, 0-5 Units, QID (AC + HS) PRN  melatonin, 6 mg, Nightly PRN  naloxone, 0.02 mg, PRN  sodium chloride 0.9%, 10 mL, Q12H PRN    Calorie Containing IV Medications: no significant kcals from medications at this time    Recent Labs   Lab 05/20/25  0326 05/23/25  1920 05/24/25  0328 05/25/25  0337 05/26/25  0346   * 129* 129* 129* 127*   K 4.6 4.4 4.3 4.5 4.9   CALCIUM 8.5* 9.3 9.3 9.3 9.2   PHOS 2.1*  --  4.1 4.2 4.5   MG 1.90  --  1.90 2.20 2.20   CL 99 92* 96* 96* 94*   CO2 23 19* 22* 19* 19*   BUN 29.7* 36.6* 38.6* 46.3* 53.2*   CREATININE 1.13 1.45* 1.33* 1.38* 1.61*   EGFRNORACEVR >60 53 59 56 47   * 111 142* 148* 158*   BILITOT  --  5.4* 5.0* 4.3* 4.2*   ALKPHOS  --  242* 256* 284* 292*   ALT  --  37 35 45 47   AST  --  32 34 44 45   ALBUMIN  --  3.6 3.3* 3.4 3.4   WBC 10.88 5.95 5.83 5.36 5.99   HGB 8.2* 8.6* 8.2* 8.2* 8.1*   HCT 25.7* 25.9* 24.6* 24.9* 25.1*     Nutrition Orders:  Diet Heart Healthy  Dietary nutrition supplements TID; Boost Original Nutritional Drink - Chocolate    Appetite/Oral Intake: fair/50-75% of meals  Factors Affecting Nutritional Intake: decreased appetite  Social Needs Impacting Access to Food: none identified  Food/Quaker/Cultural Preferences: none reported  Food Allergies: no known food allergies  Last Bowel Movement: 05/24/25  Wound(s):  skin intact    Comments    5/26/25 -- Pt reports fair appetite over the last week also dislike of heart healthy diet, pt without food pretences this visit, agreeable to Boost in chocolate flavor; denies n/v/d; melinda fluctuation noted per EMR weight history review most likely related to fluid with h/o HF, continue  heart healthy diet as ordered; Glu (H) - no h/o DM, most likely steroid induced, will continue to monitor for need to adjust diet    Anthropometrics    Height: 6' (182.9 cm),    Last Weight: 95.1 kg (209 lb 11.2 oz) (05/26/25 0615), Weight Method: Bed Scale  BMI (Calculated): 28.4  BMI Classification: overweight (BMI 25-29.9)        Ideal Body Weight (IBW), Male: 178 lb                       Usual Body Weight (UBW), kg:  (200-220 lb)        Usual Weight Provided By: patient and EMR weight history    Wt Readings from Last 5 Encounters:   05/26/25 95.1 kg (209 lb 11.2 oz)   05/19/25 94.3 kg (208 lb)   03/24/25 102.1 kg (225 lb)   03/07/25 101.2 kg (223 lb)   03/04/25 102.5 kg (225 lb 14.4 oz)     Weight Change(s) Since Admission: new admit; 1-2+ edema present  Wt Readings from Last 1 Encounters:   05/26/25 0615 95.1 kg (209 lb 11.2 oz)   05/25/25 0700 91.4 kg (201 lb 8 oz)   05/25/25 0500 91.4 kg (201 lb 8 oz)   05/24/25 0700 94.1 kg (207 lb 7.3 oz)   05/24/25 0545 94.1 kg (207 lb 8 oz)   05/23/25 1834 94 kg (207 lb 3.7 oz)   Admit Weight: 94 kg (207 lb 3.7 oz) (05/23/25 1834), Weight Method: Estimated    Estimated Needs    Weight Used For Calorie Calculations: 95 kg (209 lb 7 oz)  Energy Calorie Requirements (kcal): 2724-9861 kcal (23 - 25 kcal/kg)  Energy Need Method: Kcal/kg  Weight Used For Protein Calculations: 95 kg (209 lb 7 oz)  Protein Requirements: 76-95 gm (0.8-1 gm/kg)  Fluid Requirements (mL): 2417-1299 ml (1ml/kcal)        Enteral Nutrition     Patient not receiving enteral nutrition at this time.    Parenteral Nutrition     Patient not receiving parenteral nutrition support at this time.    Evaluation of Received Nutrient Intake    Calories: not meeting estimated needs  Protein: not meeting estimated needs    Patient Education     Not applicable.    Nutrition Diagnosis     PES: Inadequate oral intake related to acute illness as evidenced by < 75% nutrition intake x 1 week. (new)     PES:             Nutrition Interventions     Intervention(s): modified composition of meals/snacks, commercial beverage, and collaboration with other providers  Intervention(s):      Goal: Meet greater than 80% of nutritional needs by follow-up. (new)  Goal: Maintain weight throughout hospitalization. (new)    Nutrition Goals & Monitoring     Dietitian will monitor: food and beverage intake, weight, and electrolyte/renal panel  Discharge planning: continue heart healthy diet  Nutrition Risk/Follow-Up: dietitian will follow-up one time per week   Please consult if re-assessment needed sooner.

## 2025-05-26 NOTE — PLAN OF CARE
05/24/25 1000   Discharge Assessment   Assessment Type Discharge Planning Assessment   Confirmed/corrected address, phone number and insurance Yes   Confirmed Demographics Correct on Facesheet   Source of Information health record;patient;family   People in Home alone   Do you expect to return to your current living situation? No   Do you have help at home or someone to help you manage your care at home? No   Prior to hospitilization cognitive status: Unable to Assess   Current cognitive status: Alert/Oriented   Walking or Climbing Stairs Difficulty yes   Walking or Climbing Stairs ambulation difficulty, requires equipment   Mobility Management rollator   Dressing/Bathing Difficulty no   Home Accessibility wheelchair accessible   Equipment Currently Used at Home rollator   Readmission within 30 days? No   Patient currently being followed by outpatient case management? No   Do you currently have service(s) that help you manage your care at home? No   Do you take prescription medications? Yes   Do you have prescription coverage? Yes   Do you have any problems affording any of your prescribed medications? No   Is the patient taking medications as prescribed? yes   Who is going to help you get home at discharge? family   How do you get to doctors appointments? family or friend will provide   Are you on dialysis? No   Discharge Plan A New Nursing Home placement - [facility] care facility   DME Needed Upon Discharge  none   Discharge Plan discussed with: Patient;POA   Name(s) and Number(s) Tiffanie Sweeney [phone]   Transition of Care Barriers None   Physical Activity   On average, how many days per week do you engage in moderate to strenuous exercise (like a brisk walk)? 0 days   On average, how many minutes do you engage in exercise at this level? 0 min   Financial Resource Strain   How hard is it for you to pay for the very basics like food, housing, medical care, and heating? Not hard   Housing Stability   In  the last 12 months, was there a time when you were not able to pay the mortgage or rent on time? N   At any time in the past 12 months, were you homeless or living in a shelter (including now)? N   Transportation Needs   In the past 12 months, has lack of transportation kept you from medical appointments or from getting medications? no   In the past 12 months, has lack of transportation kept you from meetings, work, or from getting things needed for daily living? No   Food Insecurity   Within the past 12 months, you worried that your food would run out before you got the money to buy more. Never true   Within the past 12 months, the food you bought just didn't last and you didn't have money to get more. Never true   Stress   Do you feel stress - tense, restless, nervous, or anxious, or unable to sleep at night because your mind is troubled all the time - these days? Not at all   Social Isolation   How often do you feel lonely or isolated from those around you?  Never   Alcohol Use   Q1: How often do you have a drink containing alcohol? Never   Q2: How many drinks containing alcohol do you have on a typical day when you are drinking? None   Q3: How often do you have six or more drinks on one occasion? Never   Utilities   In the past 12 months has the electric, gas, oil, or water company threatened to shut off services in your home? No   Health Literacy   How often do you need to have someone help you when you read instructions, pamphlets, or other written material from your doctor or pharmacy? Never

## 2025-05-26 NOTE — PLAN OF CARE
Problem: Occupational Therapy  Goal: Occupational Therapy Goal  Description: Goals to be met by: DC     Patient will increase functional independence with ADLs by performing:    UE Dressing with Set-up Assistance.  LE Dressing with Stand-by Assistance.  Grooming while seated at sink with Modified [place].  Toileting from bedside commode with Modified [place] for hygiene and clothing management.   Toilet transfer to bedside commode with Stand-by Assistance using RW.    Outcome: Progressing

## 2025-05-26 NOTE — PT/OT/SLP EVAL
Occupational Therapy   Evaluation    Name: Ran Reyes Jr.  MRN: [id number]  Admitting Diagnosis: deconditioning, CHF  Recent Surgery: * No surgery found *      Recommendations:     Discharge Recommendations: Low Intensity Therapy  Discharge Equipment Recommendations:  to be determined by next level of care  Barriers to discharge:  Decreased caregiver support, Other (Comment) (pt unable to care for himself at home, seeking NH placement)    Assessment:     Ran Reyes Jr. is a 69 y.o. 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 Of note, discharged from this facility on 5/20/25 after admission for cardiogenic/septic shock. Adrenal insufficiency noted during that admission; discharged on prednisone taper. Documented inability to tolerate GDMT. Discharged with life vest. According to documentation, was discharged with HH. Today, patient states he lives alone and family members have not been coming to help regularly. States he has not eaten since yesterday afternoon as no family member came to bring him food. He did not take home meds today. He is requesting nursing home placement as he is no longer able to care for himself at home.       He presents with BLE edema, poor endurance for standing activity, but generally cooperative, able to perform grooming ADLs seated EOB. Performance deficits affecting function: weakness, impaired endurance, impaired self care skills, impaired functional mobility, gait instability, decreased lower extremity function, decreased safety awareness, edema, impaired cardiopulmonary response to activity.      Rehab  Prognosis: Fair; patient would benefit from acute skilled OT services to address these deficits and reach maximum level of function.       Plan:     Patient to be seen 2 x/week to address the above listed problems via therapeutic exercises, therapeutic activities, self-care/home management  Plan of Care Expires:  (DC)  Plan of Care Reviewed with: patient    Subjective     Chief Complaint: BLE edema, poor endurance, family and patient unable to provide sufficient level of care at home  Patient/Family Comments/goals: NH placement    Occupational Profile:  Living Environment: 1st floor apt alone, no steps and tub shower combo ; pt takes sponge bath seated at sink due to no DME for tub; recommend tub transfer bench   Previous level of function: I with ADLs, ambulated with rollator; pt reported increased urgency of bowels recently at home and incontinence episodes at last admission  Roles and Routines: pt does not drive ; sister and niece assist with shopping, chores, cooking and transportation, however [place] stating family unable to care for him at this level anymore  Equipment Used at home: oxygen, hospital bed, rollator, other (see comments) (life vest)  Assistance upon Discharge: none available     Pain/Comfort:  Pain Rating 1: 0/10    Patients cultural, spiritual, Adventism conflicts given the current situation: no    Objective:     Communicated with: nurse Shah prior to session.  Patient found sitting edge of bed with external pacer, peripheral IV, telemetry upon OT entry to room.    General Precautions: Standard, fall  Orthopedic Precautions: N/A  Braces: N/A  Respiratory Status: Room air  Vitals   At Rest (pre-session)  BP  115/80   HR  62   O2 Sat %  96      With Activity (post-session)  BP     HR  ranged    O2 Sat %  88       Occupational Performance:    Functional Mobility/Transfers:  Patient completed Sit <> Stand Transfer with contact guard assistance  with  rolling walker    Functional Mobility: CGA to ambulate roughly 10 ft around foot of bed using RW, with cues for technique and c/o unfamiliar with RW (uses rollator at home)    Activities of Daily Living:  Feeding:  modified independence seated EOB  Grooming: stand by assistance for setup seated EOB  Upper Body Dressing: minimum assistance .  Lower Body Dressing: maximal assistance seated EOB  Toileting: maximal assistance per pt report c/t decreased endurance    Cognitive/Visual Perceptual:  Cognitive/Psychosocial Skills:     -       Oriented to: Person, Place, Time, and Situation   -       Follows Commands/attention:Inattentive and Follows multistep  commands  -       Safety awareness/insight to disability: impaired   -       Mood/Affect/Coping skills/emotional control: Blunted and Guarded    Physical Exam:  BUE AROM WFL, strength WFL, FM coordination intact  R hand dominant    Treatment & Education:  Pt. educated on OT goals, POC, orientation to environment, use of call bell for assist with transfers OOB or for any other needs due to fall risk.  Pt verbalized understanding.    Patient left sitting edge of bed with all lines intact, call button in reach, and nurse notified    GOALS:   Multidisciplinary Problems       Occupational Therapy Goals          Problem: Occupational Therapy    Goal Priority Disciplines Outcome Interventions   Occupational Therapy Goal     OT, PT/OT Progressing    Description: Goals to be met by: DC     Patient will increase functional independence with ADLs by performing:    UE Dressing with Set-up Assistance.  LE Dressing with Stand-by Assistance.  Grooming while seated at sink with Modified [place].  Toileting from bedside commode with Modified [place] for hygiene and clothing management.   Toilet transfer to bedside commode with Stand-by Assistance using RW.                         DME Justifications:  No DME recommended requiring DME justifications    History:     Past Medical History:    Diagnosis Date    A-fib     Anticoagulant long-term use     Anxiety     Aortic aneurysm     Cataract     CHF (congestive heart failure)     Chronic atrial fibrillation     COPD (chronic obstructive pulmonary disease)     Coronary artery disease     Depression     HLD (hyperlipidemia)     Hypertension     Mitral regurgitation     PAD (peripheral artery disease)     Primary open angle glaucoma (POAG)          Past Surgical History:   Procedure Laterality Date    ANGIOGRAM, CORONARY, WITH LEFT HEART CATHETERIZATION N/A 03/26/2024    Procedure: Angiogram, Coronary, with Left Heart Cath;  Surgeon: Adriano Montiel MD;  Location: [facility] CATH LAB;  Service: Cardiology;  Laterality: N/A;    ATHERECTOMY OF PERIPHERAL VESSEL Left 09/12/2022    LEFT SFA ATHERECTOMY, BALLOON ANGIOPLASTY    CATARACT EXTRACTION W/  INTRAOCULAR LENS IMPLANT Left 03/09/2023    Procedure: EXTRACTION, CATARACT, WITH IOL INSERTION;  Surgeon: Georgi Borja MD;  Location: [facility];  Service: Ophthalmology;  Laterality: Left;  19.5  mac    EGD, WITH CLOSED BIOPSY  03/22/2024    Procedure: EGD, WITH CLOSED BIOPSY;  Surgeon: Ronald Calderon MD;  Location: [facility] ENDOSCOPY;  Service: Gastroenterology;;    ESOPHAGOGASTRODUODENOSCOPY N/A 03/22/2024    Procedure: EGD;  Surgeon: Ronald Calderon MD;  Location: [facility] ENDOSCOPY;  Service: Gastroenterology;  Laterality: N/A;    Heart Stent N/A     > 10yrs. AGO    INCISION AND DRAINAGE N/A 03/18/2024    Procedure: Incision and Drainage;  Surgeon: Fahad Rivas MD;  Location: [facility];  Service: Urology;  Laterality: N/A;  I&D SCROTAL ABSCESS    INSERTION OF STENT INTO PERIPHERAL VESSEL Right 10/17/2022    RIGHT SFA ATHERECTOMY, BALLOON ANGIOPLASTY, STENT; RIGHT ANTERIOR TIBIAL ARTERY ATHERECTOMY, BALLOON ANGIOPLASTY    ORCHIECTOMY Left 03/18/2024    Procedure: ORCHIECTOMY;  Surgeon: Fahad Rivas MD;  Location: [facility];  Service: Urology;  Laterality: Left;       Time Tracking:      OT Date of Treatment: 05/26/25  OT Start Time: 1150  OT Stop Time: 1217  OT Total Time (min): 27 min    Billable Minutes:Evaluation 27, moderate    5/26/2025

## 2025-05-26 NOTE — PT/OT/SLP EVAL
"Physical Therapy Evaluation    Patient Name:  Ran Reyes Jr.   MRN:  [id number]    Recommendations:     Therapy Intensity Recommendations at Discharge: Low Intensity Therapy  Discharge Equipment Recommendations: to be determined by next level of care   Equipment to be obtained for discharge: TBD pending progress.  Barriers to discharge: severity of deficits, level of skilled assistance required, decreased caregiver support, limited family support, and complicated medical history    Assessment:     Ran Reyes Jr. is a 69 y.o. 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 States he has not eaten since yesterday afternoon as no family member came to bring him food. He did not take home meds today. He is requesting nursing home placement as he is no longer able to care for himself at home."  1. Chronic congestive heart failure, unspecified heart failure type    2. Weakness    3. Longstanding persistent atrial fibrillation    4. ELIZABETH (acute kidney injury)    5. Acute cystitis without hematuria    6. Unable to care for self    7. Chest pain    8. Elevated troponin    9. Arteriosclerosis of coronary artery       Problem List[1]   He presents with the following impairments/functional limitations:  weakness, impaired endurance, impaired functional mobility, edema, impaired cardiopulmonary response to activity.    Rehab Prognosis: Fair.    Patient would benefit from continued skilled acute PT services to: address above listed impairments/functional " limitations; receive patient/caregiver education; reduce fall risk; and maximize independency/safety with functional mobility.    Recent Surgery: * No surgery found *      Plan:     During this hospitalization, patient to be seen 3 x/week to address the identified impairments/functional limitations via gait training, therapeutic activities, therapeutic exercises and progress toward the established goals.    Plan of Care Expires:  06/23/25    Subjective     Communicated with patient's nurse Nani prior to session.    Patient agreeable to participate in evaluation.     Chief Complaint: LE swelling/Legs feeling heavy.   Patient/Family Comments/goals: Get taken care of.   Pain/Comfort:  Pain Rating 1: 0/10  Pain Rating Post-Intervention 1: 0/10    Patients cultural, spiritual, Congregation conflicts given the current situation: no    Social History  Living Environment: Patient lives alone in a first floor apartment, with no steps, with tub-shower combo.  Functional Level: Prior to admission patient reports a gradual decline, but was able to ambulate with Rollator. Unable to cook, take care of himself, washes up at the sink.  Equipment Used at Home: rollator  Equipment owned (not currently used): none.  Assistance Upon Discharge: none available.    Objective:     Patient found sitting edge of bed with external pacer, peripheral IV, telemetry  upon PT entry to room.    General Precautions: Standard, fall   Orthopedic Precautions:N/A   Braces:  N/A  Respiratory Status: room air    Vitals   At Rest (pre-session)  BP  115/80   HR  62   O2 Sat %  96      With Activity (post-session)  BP     HR  ranged    O2 Sat %  88     Exams:  Orientation: Patient is oriented to person, place, time, situation  Commands: Patient follows commands consistently  Skin Integrity/Edema:     -       Edema: Pitting B lower leg, with weeping on the L  RLE ROM: WFL  RLE Strength: Deficits: 2+/5 at hip flexion  LLE ROM: WFL  LLE Strength:  Deficits: 3-/5 at hip flexion    Functional Mobility:    Transfers:  Sit to Stand: contact guard assistance with rolling walker  Stand to Sit: contact guard assistance with rolling walker    Gait:  Patient ambulated 10 ft with rolling walker and contact guard assistance.  Patient demonstrates :       decreased susie       decreased bilateral foot/floor clearance      slowed, guarded, time consuming movements - all transitional activities.      Balance:  Sit  Static: GOOD: Takes MODERATE challenges from all directions  Dynamic: FAIR+: Maintains balance through MINIMAL excursions of active trunk motion  Stand  Static: FAIR: Maintains without assist but unable to take challenges  Dynamic: FAIR: Needs CONTACT GUARD during gait    Additional Treatment Session  N/A    Patient left sitting edge of bed with call button in reach, tray table at bedside, and patient's nurse notified.    Education     Patient educated on the importance of early mobility to prevent functional decline during hospital stay.  Patient educated on and assisted with functional mobility as noted above.  Patient educated on PT Plan of Care and role of PT in acute care.  Patient was instructed to utilize staff assistance for mobility/transfers.  White board updated regarding patient's safest level of mobility with staff assistance    Goals     Multidisciplinary Problems       Physical Therapy Goals          Problem: Physical Therapy    Goal Priority Disciplines Outcome Interventions   Physical Therapy Goal     PT, PT/OT Progressing    Description: Goals to be met by: Discharge      Patient will increase functional independence with mobility by performin. Sit to stand transfer with Supervision  2. Bed to chair transfer with Supervision using Rolling Walker  3. Gait  x 130 feet with Stand-by Assistance using Rolling Walker vs Rollator.   4. Increased functional strength to 4/5 for B LE.                       History:     Past Medical History:    Diagnosis Date    A-fib     Anticoagulant long-term use     Anxiety     Aortic aneurysm     Cataract     CHF (congestive heart failure)     Chronic atrial fibrillation     COPD (chronic obstructive pulmonary disease)     Coronary artery disease     Depression     HLD (hyperlipidemia)     Hypertension     Mitral regurgitation     PAD (peripheral artery disease)     Primary open angle glaucoma (POAG)      Past Surgical History:   Procedure Laterality Date    ANGIOGRAM, CORONARY, WITH LEFT HEART CATHETERIZATION N/A 03/26/2024    Procedure: Angiogram, Coronary, with Left Heart Cath;  Surgeon: Adriano Montiel MD;  Location: [facility] CATH LAB;  Service: Cardiology;  Laterality: N/A;    ATHERECTOMY OF PERIPHERAL VESSEL Left 09/12/2022    LEFT SFA ATHERECTOMY, BALLOON ANGIOPLASTY    CATARACT EXTRACTION W/  INTRAOCULAR LENS IMPLANT Left 03/09/2023    Procedure: EXTRACTION, CATARACT, WITH IOL INSERTION;  Surgeon: Georgi Borja MD;  Location: [facility];  Service: Ophthalmology;  Laterality: Left;  19.5  mac    EGD, WITH CLOSED BIOPSY  03/22/2024    Procedure: EGD, WITH CLOSED BIOPSY;  Surgeon: Ronald Calderon MD;  Location: [facility] ENDOSCOPY;  Service: Gastroenterology;;    ESOPHAGOGASTRODUODENOSCOPY N/A 03/22/2024    Procedure: EGD;  Surgeon: Ronald Calderon MD;  Location: [facility] ENDOSCOPY;  Service: Gastroenterology;  Laterality: N/A;    Heart Stent N/A     > 10yrs. AGO    INCISION AND DRAINAGE N/A 03/18/2024    Procedure: Incision and Drainage;  Surgeon: Fahad Rivas MD;  Location: [facility];  Service: Urology;  Laterality: N/A;  I&D SCROTAL ABSCESS    INSERTION OF STENT INTO PERIPHERAL VESSEL Right 10/17/2022    RIGHT SFA ATHERECTOMY, BALLOON ANGIOPLASTY, STENT; RIGHT ANTERIOR TIBIAL ARTERY ATHERECTOMY, BALLOON ANGIOPLASTY    ORCHIECTOMY Left 03/18/2024    Procedure: ORCHIECTOMY;  Surgeon: Fahad Rivas MD;  Location: [facility];  Service: Urology;  Laterality: Left;     Time Tracking:     PT  Received On: 05/26/25  PT Start Time: 1150     PT Stop Time: 1217  PT Total Time (min): 27 min     Billable Minutes: Evaluation 27; moderate     05/26/2025       [1]   Patient Active Problem List  Diagnosis    Primary open angle glaucoma (POAG) of right eye, moderate stage    Combined forms of age-related cataract of left eye    Arteriosclerosis of coronary artery    Chronic atrial fibrillation    Dyslipidemia    Hypertension    Tobacco use    PVD (peripheral vascular disease)    VHD (valvular heart disease)    COVID-19    HFrEF (heart failure with reduced ejection fraction)    Nonrheumatic mitral valve regurgitation    Postoperative eye state    Scrotal hematoma    Chronic obstructive pulmonary disease, unspecified    Lesion of external ear    Low back pain    Other thrombophilia    Secondary hyperaldosteronism    Positive colorectal cancer screening using Cologuard test    Acute heart failure    History of COPD    CHF (congestive heart failure)    Acute cystitis with hematuria    Tobacco dependency

## 2025-05-26 NOTE — CONSULTS
Spoke with the pt & his POA Tiffanie Carrier [phone], both agree with nursing home placement. Pt selected [facility]. Referral submitted via Epic. Acceptance pending. Will follow.

## 2025-05-27 LAB
ALBUMIN SERPL-MCNC: 3.5 G/DL (ref 3.4–4.8)
ALBUMIN/GLOB SERPL: 0.9 RATIO (ref 1.1–2)
ALP SERPL-CCNC: 344 UNIT/L (ref 40–150)
ALT SERPL-CCNC: 52 UNIT/L (ref 0–55)
ANION GAP SERPL CALC-SCNC: 15 MEQ/L
AST SERPL-CCNC: 51 UNIT/L (ref 11–45)
BASOPHILS # BLD AUTO: 0.01 X10(3)/MCL
BASOPHILS NFR BLD AUTO: 0.2 %
BILIRUB SERPL-MCNC: 5.2 MG/DL
BUN SERPL-MCNC: 55.4 MG/DL (ref 8.4–25.7)
CALCIUM SERPL-MCNC: 9.2 MG/DL (ref 8.8–10)
CHLORIDE SERPL-SCNC: 95 MMOL/L (ref 98–107)
CO2 SERPL-SCNC: 20 MMOL/L (ref 23–31)
CREAT SERPL-MCNC: 1.75 MG/DL (ref 0.72–1.25)
CREAT/UREA NIT SERPL: 32
EOSINOPHIL # BLD AUTO: 0 X10(3)/MCL (ref 0–0.9)
EOSINOPHIL NFR BLD AUTO: 0 %
ERYTHROCYTE [DISTWIDTH] IN BLOOD BY AUTOMATED COUNT: 25.7 % (ref 11.5–17)
GFR SERPLBLD CREATININE-BSD FMLA CKD-EPI: 42 ML/MIN/1.73/M2
GLOBULIN SER-MCNC: 3.8 GM/DL (ref 2.4–3.5)
GLUCOSE SERPL-MCNC: 140 MG/DL (ref 82–115)
HCT VFR BLD AUTO: 24.1 % (ref 42–52)
HGB BLD-MCNC: 8.1 G/DL (ref 14–18)
IMM GRANULOCYTES # BLD AUTO: 0.02 X10(3)/MCL (ref 0–0.04)
IMM GRANULOCYTES NFR BLD AUTO: 0.4 %
LYMPHOCYTES # BLD AUTO: 0.43 X10(3)/MCL (ref 0.6–4.6)
LYMPHOCYTES NFR BLD AUTO: 8.1 %
MAGNESIUM SERPL-MCNC: 2.3 MG/DL (ref 1.6–2.6)
MCH RBC QN AUTO: 25.6 PG (ref 27–31)
MCHC RBC AUTO-ENTMCNC: 33.6 G/DL (ref 33–36)
MCV RBC AUTO: 76 FL (ref 80–94)
MONOCYTES # BLD AUTO: 0.55 X10(3)/MCL (ref 0.1–1.3)
MONOCYTES NFR BLD AUTO: 10.4 %
NEUTROPHILS # BLD AUTO: 4.28 X10(3)/MCL (ref 2.1–9.2)
NEUTROPHILS NFR BLD AUTO: 80.9 %
NRBC BLD AUTO-RTO: 0 %
PHOSPHATE SERPL-MCNC: 4 MG/DL (ref 2.3–4.7)
PLATELET # BLD AUTO: 89 X10(3)/MCL (ref 130–400)
PLATELETS.RETICULATED NFR BLD AUTO: 4.7 % (ref 0.9–11.2)
PMV BLD AUTO: ABNORMAL FL
POTASSIUM SERPL-SCNC: 4.7 MMOL/L (ref 3.5–5.1)
PROT SERPL-MCNC: 7.3 GM/DL (ref 5.8–7.6)
RBC # BLD AUTO: 3.17 X10(6)/MCL (ref 4.7–6.1)
SODIUM SERPL-SCNC: 130 MMOL/L (ref 136–145)
WBC # BLD AUTO: 5.29 X10(3)/MCL (ref 4.5–11.5)

## 2025-05-27 PROCEDURE — 36415 COLL VENOUS BLD VENIPUNCTURE: CPT

## 2025-05-27 PROCEDURE — 11000001 HC ACUTE MED/SURG PRIVATE ROOM

## 2025-05-27 PROCEDURE — 63600175 PHARM REV CODE 636 W HCPCS

## 2025-05-27 PROCEDURE — 97110 THERAPEUTIC EXERCISES: CPT

## 2025-05-27 PROCEDURE — 97535 SELF CARE MNGMENT TRAINING: CPT

## 2025-05-27 PROCEDURE — 97530 THERAPEUTIC ACTIVITIES: CPT

## 2025-05-27 PROCEDURE — 80053 COMPREHEN METABOLIC PANEL: CPT

## 2025-05-27 PROCEDURE — 99900035 HC TECH TIME PER 15 MIN (STAT)

## 2025-05-27 PROCEDURE — 94640 AIRWAY INHALATION TREATMENT: CPT

## 2025-05-27 PROCEDURE — 83735 ASSAY OF MAGNESIUM: CPT

## 2025-05-27 PROCEDURE — 27000207 HC ISOLATION

## 2025-05-27 PROCEDURE — 25000003 PHARM REV CODE 250

## 2025-05-27 PROCEDURE — 84100 ASSAY OF PHOSPHORUS: CPT

## 2025-05-27 PROCEDURE — 85025 COMPLETE CBC W/AUTO DIFF WBC: CPT

## 2025-05-27 PROCEDURE — 25000242 PHARM REV CODE 250 ALT 637 W/ HCPCS

## 2025-05-27 RX ORDER — BUMETANIDE 1 MG/1
1 TABLET ORAL DAILY
Status: DISCONTINUED | OUTPATIENT
Start: 2025-05-28 | End: 2025-05-28

## 2025-05-27 RX ORDER — CARVEDILOL 3.12 MG/1
3.12 TABLET ORAL 2 TIMES DAILY
Status: DISCONTINUED | OUTPATIENT
Start: 2025-05-27 | End: 2025-06-02

## 2025-05-27 RX ADMIN — Medication 1000 UNITS: at 08:05

## 2025-05-27 RX ADMIN — GABAPENTIN 300 MG: 300 CAPSULE ORAL at 08:05

## 2025-05-27 RX ADMIN — CLOPIDOGREL BISULFATE 75 MG: 75 TABLET, FILM COATED ORAL at 08:05

## 2025-05-27 RX ADMIN — PREDNISONE 15 MG: 5 TABLET ORAL at 08:05

## 2025-05-27 RX ADMIN — QUETIAPINE FUMARATE 100 MG: 100 TABLET ORAL at 08:05

## 2025-05-27 RX ADMIN — IPRATROPIUM BROMIDE AND ALBUTEROL SULFATE 3 ML: 2.5; .5 SOLUTION RESPIRATORY (INHALATION) at 07:05

## 2025-05-27 RX ADMIN — NITROFURANTOIN (MONOHYDRATE/MACROCRYSTALS) 100 MG: 25; 75 CAPSULE ORAL at 08:05

## 2025-05-27 RX ADMIN — ATORVASTATIN CALCIUM 80 MG: 40 TABLET, FILM COATED ORAL at 08:05

## 2025-05-27 RX ADMIN — CARVEDILOL 3.12 MG: 3.12 TABLET, FILM COATED ORAL at 11:05

## 2025-05-27 RX ADMIN — PANTOPRAZOLE SODIUM 40 MG: 40 TABLET, DELAYED RELEASE ORAL at 08:05

## 2025-05-27 RX ADMIN — SERTRALINE HYDROCHLORIDE 100 MG: 50 TABLET ORAL at 08:05

## 2025-05-27 RX ADMIN — RIVAROXABAN 20 MG: 10 TABLET, FILM COATED ORAL at 04:05

## 2025-05-27 RX ADMIN — CARVEDILOL 3.12 MG: 3.12 TABLET, FILM COATED ORAL at 08:05

## 2025-05-27 RX ADMIN — GABAPENTIN 300 MG: 300 CAPSULE ORAL at 04:05

## 2025-05-27 NOTE — PLAN OF CARE
Problem: Heart Failure  Goal: Optimal Coping  Outcome: Progressing  Goal: Optimal Cardiac Output  Outcome: Progressing  Goal: Stable Heart Rate and Rhythm  Outcome: Progressing  Goal: Optimal Functional Ability  Outcome: Progressing  Goal: Fluid and Electrolyte Balance  Outcome: Progressing  Goal: Improved Oral Intake  Outcome: Progressing  Goal: Effective Oxygenation and Ventilation  Outcome: Progressing  Goal: Effective Breathing Pattern During Sleep  Outcome: Progressing

## 2025-05-27 NOTE — PT/OT/SLP PROGRESS
Occupational Therapy   Treatment    Name: Ran Reyes Jr.  MRN: [id number]  Admitting Diagnosis:       1. Chronic congestive heart failure, unspecified heart failure type    2. Weakness    3. Longstanding persistent atrial fibrillation    4. ELIZABETH (acute kidney injury)    5. Acute cystitis without hematuria    6. Unable to care for self    7. Chest pain    8. Elevated troponin    9. Arteriosclerosis of coronary artery    Problem List[1]   Recommendations:     Discharge Recommendations: Low Intensity Therapy  Discharge Equipment Recommendations:  to be determined by next level of care  Barriers to discharge:    Decreased caregiver support, Other (Comment) (pt unable to care for himself at home, seeking NH placement)       Assessment:     Ran Reyes Jr. is a 69 y.o. male with a medical diagnosis of see above .  He presents with B LE edema and generalized weakness and poor endurance impacting mobility and self care task performance. Pt with flat affect and limited initiation during self care routine despite soiled linens, brief and hospital gown. Performance deficits affecting function are weakness, impaired endurance, impaired self care skills, impaired functional mobility, gait instability, impaired balance, decreased lower extremity function, impaired skin, edema, impaired cardiopulmonary response to activity.     Rehab Prognosis:  Fair; patient would benefit from acute skilled OT services to address these deficits and reach maximum level of function.       Plan:     Patient to be seen 2 x/week to address the above listed problems via self-care/home management, therapeutic activities, therapeutic exercises  Plan of Care Expires:  (DC)  Plan of Care Reviewed with: patient    Subjective     Chief Complaint: I can't go home like this!  Patient/Family Comments/goals: NH placement   Pain/Comfort:  Pain Rating 1: 0/10  Pain Addressed 1: Nurse notified  Pain Rating Post-Intervention 1: 0/10    Objective:      Communicated with: Nurse Rose prior to session.  Patient found HOB elevated with external pacer, peripheral IV, telemetry    upon OT entry to room.    General Precautions: Standard, fall, contact    Orthopedic Precautions:N/A  Braces: N/A  Respiratory Status: Room air     Occupational Performance:     Bed Mobility:    Patient completed Supine to Sit with moderate assistance  Patient completed Sit to Supine with moderate assistance     Functional Mobility/Transfers:  Patient completed Sit <> Stand Transfer with minimum assistance  with  rolling walker   Patient completed Toilet Transfer Step Transfer technique with moderate assistance with  rolling walker and bedside commode    Activities of Daily Living:  Grooming: supervision seated EOB to wipe face ; pt denied oral care   Upper Body Dressing: moderate assistance and decreased initiation doff soiled hospital gown and don hospital gown   Lower Body Dressing: total assistance don socks while seated EOB   Toileting: pt stood with RW and B UE support and  CGA while OT provided total A for change of brief.       Treatment & Education:  Pt stood EOB 1 min x 3 trials with RW and CGA for balance .     Patient left HOB elevated with all lines intact, call button in reach, bed alarm on, and nurse notified    GOALS:   Multidisciplinary Problems       Occupational Therapy Goals          Problem: Occupational Therapy    Goal Priority Disciplines Outcome Interventions   Occupational Therapy Goal     OT, PT/OT Progressing    Description: Goals to be met by: DC     Patient will increase functional independence with ADLs by performing:    UE Dressing with Set-up Assistance.  LE Dressing with Stand-by Assistance.  Grooming while seated at sink with Modified [place].  Toileting from bedside commode with Modified [place] for hygiene and clothing management.   Toilet transfer to bedside commode with Stand-by Assistance using RW.                         DME  Justifications:  No DME recommended requiring DME justifications    Time Tracking:     OT Date of Treatment: 05/27/25  OT Start Time: 1346  OT Stop Time: 1409  OT Total Time (min): 23 min    Billable Minutes:Self Care/Home Management 13 min   Therapeutic Activity 12 min     OT/ALLAN: OT          5/27/2025       [1]   Patient Active Problem List  Diagnosis    Primary open angle glaucoma (POAG) of right eye, moderate stage    Combined forms of age-related cataract of left eye    Arteriosclerosis of coronary artery    Chronic atrial fibrillation    Dyslipidemia    Hypertension    Tobacco use    PVD (peripheral vascular disease)    VHD (valvular heart disease)    COVID-19    HFrEF (heart failure with reduced ejection fraction)    Nonrheumatic mitral valve regurgitation    Postoperative eye state    Scrotal hematoma    Chronic obstructive pulmonary disease, unspecified    Lesion of external ear    Low back pain    Other thrombophilia    Secondary hyperaldosteronism    Positive colorectal cancer screening using Cologuard test    Acute heart failure    History of COPD    CHF (congestive heart failure)    Acute cystitis with hematuria    Tobacco dependency

## 2025-05-27 NOTE — PT/OT/SLP PROGRESS
Physical Therapy Treatment    Patient Name:  Ran Reyes Jr.   MRN:  [id number]    Recommendations     Therapy Intensity Recommendations at Discharge: Moderate Intensity Therapy  Discharge Equipment Recommendations:  (TBD pending progress)   Barriers to discharge: level of skilled assistance required and decreased endurance    Assessment     aRn Reyes Jr. is a 69 y.o. male admitted with a medical diagnosis of   1. Chronic congestive heart failure, unspecified heart failure type    2. Weakness    3. Longstanding persistent atrial fibrillation    4. ELIZABETH (acute kidney injury)    5. Acute cystitis without hematuria    6. Unable to care for self    7. Chest pain    8. Elevated troponin    9. Arteriosclerosis of coronary artery       Problem List[1]   He presents with the following impairments/functional limitations:  weakness, impaired functional mobility, gait instability, impaired balance, decreased lower extremity function.    Rehab Prognosis: TBD pending progress.    Patient would benefit from continued skilled acute PT services to: address above listed impairments/functional limitations; receive patient/caregiver education; reduce fall risk; and maximize independency/safety with functional mobility.    Recent Surgery: * No surgery found *      Plan     During this hospitalization, patient to be seen 3 x/week to address the identified impairments/functional limitations via gait training, therapeutic activities, therapeutic exercises, neuromuscular re-education and progress toward the established goals.    Plan of Care Expires:   (Discharge)    Subjective     Communicated with patient's nurse Keyanni prior to session.    Patient agreeable to participate in treatment session.    Chief Complaint: Fatigue  Patient/Family Comments/goals: None stated   Pain/Comfort:  Pain Rating 1: 0/10  Pain Rating Post-Intervention 1: 0/10    Objective     Patient found with HOB elevated with telemetry, external pacer  (defibrillator pads)  upon PT entry to room.    General Precautions: Standard, fall   Orthopedic Precautions:N/A   Braces:     Respiratory Status: room air    Functional Mobility:    Bed Mobility:  Rolling Left: minimum assistance  Rolling Right: minimum assistance  Supine to Sit: minimum assistance  Sit to Supine: minimum assistance    Transfers:  Sit to Stand: moderate assistance with rolling walker  Stand to Sit: moderate assistance with rolling walker  Attempts x 3 with decreased clearance of bottom from bed on first trial and improved on second and third trial.    Gait:  Patient ambulated 2ft with rolling walker and moderate assistance.  Patient demonstrates :       unsteady gait.    Other Mobility:  Therapeutic Exercises performed:   2 sets times 10 repetitions       -seated exercises:              marches            hamstring sets            long arc quads    Patient left with HOB elevated with all lines intact, call button in reach, and bed alarm on.    DME Justifications:  No DME recommended requiring DME justifications    Education     Patient educated on and assisted with functional mobility as noted above.  Patient educated on PT Plan of Care.  Patient was instructed to utilize staff assistance for mobility/transfers.  White board updated regarding patient's safest level of mobility with staff assistance    Goals     Multidisciplinary Problems       Physical Therapy Goals          Problem: Physical Therapy    Goal Priority Disciplines Outcome Interventions   Physical Therapy Goal     PT, PT/OT Progressing    Description: Goals to be met by: Discharge      Patient will increase functional independence with mobility by performin. Sit to stand transfer with Supervision  2. Bed to chair transfer with Supervision using Rolling Walker  3. Gait  x 130 feet with Stand-by Assistance using Rolling Walker vs Rollator.   4. Increased functional strength to 4/5 for B LE.                       Time Tracking      PT Received On: 05/27/25  PT Start Time: 1437     PT Stop Time: 1501  PT Total Time (min): 24 min     Billable Minutes: Therapeutic Activity 16 mins  and Therapeutic Exercise 8 mins     05/27/2025       [1]   Patient Active Problem List  Diagnosis    Primary open angle glaucoma (POAG) of right eye, moderate stage    Combined forms of age-related cataract of left eye    Arteriosclerosis of coronary artery    Chronic atrial fibrillation    Dyslipidemia    Hypertension    Tobacco use    PVD (peripheral vascular disease)    VHD (valvular heart disease)    COVID-19    HFrEF (heart failure with reduced ejection fraction)    Nonrheumatic mitral valve regurgitation    Postoperative eye state    Scrotal hematoma    Chronic obstructive pulmonary disease, unspecified    Lesion of external ear    Low back pain    Other thrombophilia    Secondary hyperaldosteronism    Positive colorectal cancer screening using Cologuard test    Acute heart failure    History of COPD    CHF (congestive heart failure)    Acute cystitis with hematuria    Tobacco dependency

## 2025-05-27 NOTE — PROGRESS NOTES
Cardiology   Mr. Ran Reyes Jr. is a 69 y.o. male with:   Chief Complaint   Patient presents with    Fatigue    Leg Swelling     PT IN /[facility] W REPORT OF WEAKNESS/SOB AND EDEMA TO LOWER LEGS. STATES HAS NOT EATEN X 18 HRS.  CBG EN ROUTE 137. 20G IV TO LT HAND /EMS.   PT FOUND TO HAVE BED BUGS /[facility]. EKG TO BE OBTAINED.          HPI  Cardiology was asked about placing ICD in this patient.  Mr. Ran Reyes Jr. with past medical history significant for HFrEF, HF exacerbation, severe MR, pulmonary hypertension (PAS 59 mm Hg), atrial fibrillation (on anticoagulation), ELIZABETH, PVD, adrenal insufficiency (on prednisone taper), UTI.    Unable to obtain history from patient.  Patient is somnolent.  He arouses to stimulation but does not answer questions appropriately.    ROS  Unable to obtain ROS    PMH:  Problem List[1]  Surgical Hx:    Past Surgical History:   Procedure Laterality Date    ANGIOGRAM, CORONARY, WITH LEFT HEART CATHETERIZATION N/A 03/26/2024    Procedure: Angiogram, Coronary, with Left Heart Cath;  Surgeon: Adriano Montiel MD;  Location: [facility] CATH LAB;  Service: Cardiology;  Laterality: N/A;    ATHERECTOMY OF PERIPHERAL VESSEL Left 09/12/2022    LEFT SFA ATHERECTOMY, BALLOON ANGIOPLASTY    CATARACT EXTRACTION W/  INTRAOCULAR LENS IMPLANT Left 03/09/2023    Procedure: EXTRACTION, CATARACT, WITH IOL INSERTION;  Surgeon: Georgi Borja MD;  Location: [facility];  Service: Ophthalmology;  Laterality: Left;  19.5  mac    EGD, WITH CLOSED BIOPSY  03/22/2024    Procedure: EGD, WITH CLOSED BIOPSY;  Surgeon: Ronald Calderon MD;  Location: [facility] ENDOSCOPY;  Service: Gastroenterology;;    ESOPHAGOGASTRODUODENOSCOPY N/A 03/22/2024    Procedure: EGD;  Surgeon: Ronald Calderon MD;  Location: [facility] ENDOSCOPY;  Service: Gastroenterology;  Laterality: N/A;    Heart Stent N/A     > 10yrs. AGO    INCISION AND DRAINAGE N/A 03/18/2024    Procedure: Incision and Drainage;  Surgeon: Fahad Rivas  MD MIKE;  Location: [facility] OR;  Service: Urology;  Laterality: N/A;  I&D SCROTAL ABSCESS    INSERTION OF STENT INTO PERIPHERAL VESSEL Right 10/17/2022    RIGHT SFA ATHERECTOMY, BALLOON ANGIOPLASTY, STENT; RIGHT ANTERIOR TIBIAL ARTERY ATHERECTOMY, BALLOON ANGIOPLASTY    ORCHIECTOMY Left 03/18/2024    Procedure: ORCHIECTOMY;  Surgeon: Fahad Rivas MD;  Location: [facility] OR;  Service: Urology;  Laterality: Left;       Social History[2]    Family History   Problem Relation Name Age of Onset    Cancer Mother      Hypertension Mother      Heart failure Father      Stroke Father      Hypertension Father      No Known Problems Sister         Review of patient's allergies indicates:  No Known Allergies    Current Medications:  Current Medications[3]  Outpatient Medications:    Current Outpatient Medications   Medication Instructions    acetaminophen 650 mg, 2 times daily PRN    albuterol (PROVENTIL/VENTOLIN HFA) 90 mcg/actuation inhaler 2 puffs, Inhalation, Every 6 hours PRN, Rescue    albuterol-ipratropium (DUO-NEB) 2.5 mg-0.5 mg/3 mL nebulizer solution 3 mLs, Nebulization, Every 6 hours PRN, Rescue    atorvastatin (LIPITOR) 80 mg, Oral, Daily    BREZTRI AEROSPHERE 160-9-4.8 mcg/actuation HFAA 2 puffs, 2 times daily    bumetanide (BUMEX) 1 mg, Oral, Daily PRN    clopidogreL (PLAVIX) 75 mg, Oral, Daily    diphenhydrAMINE (BENADRYL) 25 mg, Oral, Every 6 hours PRN    folic acid (FOLVITE) 1 mg, Oral, Daily    gabapentin (NEURONTIN) 300 mg, Oral, 3 times daily    olopatadine (PATANOL) 0.1 % ophthalmic solution Apply to eye.    pantoprazole (PROTONIX) 40 mg, Oral, Daily    potassium chloride SA (K-DUR,KLOR-CON) 20 MEQ tablet 20 mEq, Oral, 2 times daily    predniSONE (DELTASONE) 5 MG tablet Take 4 tablets (20 mg total) by mouth 2 (two) times daily for 7 days, THEN 3 tablets (15 mg total) 2 (two) times daily for 7 days, THEN 2 tablets (10 mg total) 2 (two) times daily for 7 days, THEN 1 tablet (5 mg total) 2 (two) times daily for  7 days.    QUEtiapine (SEROQUEL) 100 mg, Oral, Nightly    rivaroxaban (XARELTO) 20 mg, Oral, Daily    senna-docusate (PERICOLACE) 8.6-50 mg per tablet 1 tablet, Oral, 2 times daily PRN    sertraline (ZOLOFT) 100 mg, Oral, Daily    sulfamethoxazole-trimethoprim 800-160mg (BACTRIM DS) 800-160 mg Tab 1 tablet, Oral, 2 times daily    [Paused] urea (CARMOL) 40 % Crea Apply topically.    [Paused] varenicline tartrate (CHANTIX) 0.5 mg    vitamin D (VITAMIN D3) 1,000 Units, Daily       ROS:  Please see HPI.    BP Readings from Last 3 Encounters:   05/27/25 96/70   05/20/25 111/73   03/24/25 109/85        Pulse Readings from Last 3 Encounters:   05/27/25 68   05/20/25 80   03/24/25 85        Temp Readings from Last 3 Encounters:   05/27/25 97.6 °F (36.4 °C) (Oral)   05/20/25 98.2 °F (36.8 °C) (Oral)   03/24/25 97.8 °F (36.6 °C) (Oral)     Wt Readings from Last 3 Encounters:   05/27/25 80.7 kg (178 lb)   05/19/25 94.3 kg (208 lb)   03/24/25 102.1 kg (225 lb)     BMI:  24.14 kg/m^2    PE  Blood pressure 96/70, pulse 68, temperature 97.6 °F (36.4 °C), temperature source Oral, resp. rate 18, height 6' (1.829 m), weight 80.7 kg (178 lb), SpO2 100%.  CONSTITUTIONAL:  Somnolent  NECK:  Supple.    CARDIOVASCULAR:  Irregular, normal heart rate.    PULMONARY:  No audible wheezing.    ABDOMINAL:  No distention.    MUSCULOSKELETAL:  No deformity.    SKIN:  No bruising.  No rash.    CARDIAC TESTS  ECHO  Results for orders placed during the hospital encounter of 04/21/25    Echo    Interpretation Summary    Mitral Valve: There is severe regurgitation with a centrally directed jet.    STRESS TEST  No results found for this or any previous visit.    [facility]  Results for orders placed during the hospital encounter of 03/17/24    Cardiac catheterization    Conclusion  The procedure log was documented by No documenter listed and verified by Adriano Montiel MD.    Procedure:  Selective coronary angiography  Moderate (Conscious)  Sedation      Indication: VT arrest, known cardiomyopathy, NSTEMI  Consent: The patient was brought to the cardiac catheterization lab. Was instructed and explained about the risk, benefit and alternatives of the procedure included but not limited to sudden cardiac death, myocardial infarction, bleeding, vascular injury, renal failure, stroke, contrast allergy, risk of conscious sedation and need for emergent bypass surgery.  the patient was agreeable to proceed.  Signed the consent form.  Access:  The patient was prepped using the usual sterile fashion.  Right radial artery  was accessed with micropuncture technique, ultrasound guidance.  An image of the ultrasound was put in the paper chart for purpose of documentation.  Sheath size:6F    Kirill test:  Verified with pulse oximetry deemed to be adequate for radial artery procedure.    Diagnostic catheters : TIG, JL 3.5    Coronary findings:    Dominance: right  Left main:  10-20% calcified distal left main disease  Left anterior descending artery:  50% calcified proximal stenosis  Circumflex artery:  Luminal irregularities  Right coronary artery:  Luminal irregularities    Access Closure:  At the end of the procedure the sheath was removed. A TR band applied to the right radial artery . Excellent hemostasis achieved.      LABS  Last BMP  Lab Results   Component Value Date     (L) 05/27/2025    K 4.7 05/27/2025    CL 95 (L) 05/27/2025    CO2 20 (L) 05/27/2025    BUN 55.4 (H) 05/27/2025    CREATININE 1.75 (H) 05/27/2025    CALCIUM 9.2 05/27/2025    EGFRNORACEVR 42 05/27/2025       Lab Results   Component Value Date    CREATININE 1.75 (H) 05/27/2025    CREATININE 1.61 (H) 05/26/2025    CREATININE 1.38 (H) 05/25/2025     Lab Results   Component Value Date    BNP 2,615.6 (H) 05/23/2025    BNP 3,765.7 (H) 05/08/2025    BNP 2,823.0 (H) 04/30/2025     Lab Results   Component Value Date    TROPONINI 0.048 (H) 05/24/2025    TROPONINI 0.059 (H) 05/24/2025    TROPONINI  "0.046 (H) 05/24/2025     Last CBC     Lab Results   Component Value Date    WBC 5.29 05/27/2025    HGB 8.1 (L) 05/27/2025    HCT 24.1 (L) 05/27/2025    MCV 76.0 (L) 05/27/2025    PLT 89 (L) 05/27/2025           Last lipids    Lab Results   Component Value Date    CHOL 96 08/14/2024    CHOL 46 03/18/2024    CHOL 104 11/16/2021    HDL 32 (L) 08/14/2024    HDL <5 (L) 03/18/2024    HDL 41 11/16/2021    LDL 49.00 (L) 08/14/2024    LDL 49.00 (L) 11/16/2021    LDL 37 10/07/2020    TRIG 73 08/14/2024    TRIG 46 04/12/2024    TRIG 58 04/11/2024    TOTALCHOLEST 3 08/14/2024    TOTALCHOLEST  03/18/2024      Comment:      N/A      TOTALCHOLEST 3 11/16/2021       LFT   No components found for: "LFT"    ASSESSMENT / RECOMMENDATION / DISCUSSION   Somewhat lethargic (possibly due to medications he is receiving)  Patient needs to have understanding the complex nature of his medical condition     He is anticoagulated for atrial fibrillation.     Severe MR which puts him at risk for recurrent heart failure decompensation.    Severe MR needs to be addressed.  Consider evaluation for mitral clip.      Adrenal insufficiency on prednisone taper which puts him at increased risk for infection with device implantation.  Patient may need stress dose of steroid.      UTI with positive urine culture and also had leukocytosis earlier, has been on nitrofurantoin.  This also increases risk of infection with device implantation.  Of note, patient remains in isolation.      Previously patient had ventricular arrhythmia in setting of electrolyte abnormality (ICD was not placed at that time due electrolyte abnormality and also due to scrotal infection) Subsequently ICD was cancelled due to recurrent electrolyte abnormality.      ELIZABETH has been getting worse.  Consider nephrology input.     Patient is currently at high risk for ICD implantation.  He may benefit from EP evaluation.            [1]   Patient Active Problem List  Diagnosis    Primary open " angle glaucoma (POAG) of right eye, moderate stage    Combined forms of age-related cataract of left eye    Arteriosclerosis of coronary artery    Chronic atrial fibrillation    Dyslipidemia    Hypertension    Tobacco use    PVD (peripheral vascular disease)    VHD (valvular heart disease)    COVID-19    HFrEF (heart failure with reduced ejection fraction)    Nonrheumatic mitral valve regurgitation    Postoperative eye state    Scrotal hematoma    Chronic obstructive pulmonary disease, unspecified    Lesion of external ear    Low back pain    Other thrombophilia    Secondary hyperaldosteronism    Positive colorectal cancer screening using Cologuard test    Acute heart failure    History of COPD    CHF (congestive heart failure)    Acute cystitis with hematuria    Tobacco dependency   [2]   Social History  Socioeconomic History    Marital status: Single   Tobacco Use    Smoking status: Every Day     Current packs/day: 0.50     Average packs/day: 0.8 packs/day for 50.4 years (41.0 ttl pk-yrs)     Types: Cigarettes     Start date: 1975     Passive exposure: Current    Smokeless tobacco: Never    Tobacco comments:     States smoke 2-3 cigarettes per day   Substance and Sexual Activity    Alcohol use: Yes     Alcohol/week: 3.0 standard drinks of alcohol     Types: 3 Cans of beer per week     Comment: socially    Drug use: Not Currently     Frequency: 1.0 times per week     Types: Marijuana     Comment: no use in over 1 year    Sexual activity: Not Currently     Partners: Female     Social Drivers of Health     Financial Resource Strain: Low Risk  (5/24/2025)    Overall Financial Resource Strain (CARDIA)     Difficulty of Paying Living Expenses: Not hard at all   Food Insecurity: No Food Insecurity (5/24/2025)    Hunger Vital Sign     Worried About Running Out of Food in the Last Year: Never true     Ran Out of Food in the Last Year: Never true   Transportation Needs: No Transportation Needs (5/24/2025)    PRAPARE -  Transportation     Lack of Transportation (Medical): No     Lack of Transportation (Non-Medical): No   Physical Activity: Inactive (5/24/2025)    Exercise Vital Sign     Days of Exercise per Week: 0 days     Minutes of Exercise per Session: 0 min   Stress: No Stress Concern Present (5/24/2025)    [employer] [place] [employer] - Occupational Stress Questionnaire     Feeling of Stress : Not at all   Housing Stability: Low Risk  (5/24/2025)    Housing Stability Vital Sign     Unable to Pay for Housing in the Last Year: No     Homeless in the Last Year: No   [3]   Current Facility-Administered Medications:     albuterol-ipratropium 2.5 mg-0.5 mg/3 mL nebulizer solution 3 mL, 3 mL, Nebulization, Q4H PRN, Adeline May MD, 3 mL at 05/27/25 0705    atorvastatin tablet 80 mg, 80 mg, Oral, Daily, Adeline May MD, 80 mg at 05/27/25 0848    [START ON 5/28/2025] bumetanide tablet 1 mg, 1 mg, Oral, Daily, Roldan Cisneros MD    carvediloL tablet 3.125 mg, 3.125 mg, Oral, BID, Roldan Cisneros MD, 3.125 mg at 05/27/25 1121    clopidogreL tablet 75 mg, 75 mg, Oral, Daily, Adeline May MD, 75 mg at 05/27/25 0849    dextrose 50% injection 12.5 g, 12.5 g, Intravenous, PRN, Adeline May MD    dextrose 50% injection 25 g, 25 g, Intravenous, PRN, Adeline May MD    gabapentin capsule 300 mg, 300 mg, Oral, TID, Adeline May MD, 300 mg at 05/27/25 1619    glucagon (human recombinant) injection 1 mg, 1 mg, Intramuscular, PRN, Adeline May MD    glucose chewable tablet 16 g, 16 g, Oral, PRN, Adeline May MD    glucose chewable tablet 24 g, 24 g, Oral, PRN, Adeline May MD    insulin aspart U-100 injection 0-5 Units, 0-5 Units, Subcutaneous, QID (AC + HS) PRN, Adeline May MD    melatonin tablet 6 mg, 6 mg, Oral, Nightly PRN, Adeline May MD    naloxone 0.4 mg/mL injection 0.02 mg, 0.02 mg, Intravenous, PRN, Adeline May MD    nitrofurantoin (macrocrystal-monohydrate) 100 MG capsule 100 mg, 100 mg, Oral, Q12H, Dulce Maria Bernard,  MD, 100 mg at 05/27/25 0848    pantoprazole EC tablet 40 mg, 40 mg, Oral, Daily, Adeline May MD, 40 mg at 05/27/25 0848    [COMPLETED] predniSONE tablet 20 mg, 20 mg, Oral, BID, 20 mg at 05/25/25 2041 **FOLLOWED BY** predniSONE tablet 15 mg, 15 mg, Oral, BID, 15 mg at 05/27/25 0848 **FOLLOWED BY** [START ON 6/2/2025] predniSONE tablet 10 mg, 10 mg, Oral, BID **FOLLOWED BY** [START ON 6/9/2025] predniSONE tablet 5 mg, 5 mg, Oral, BID, Adeline May MD    QUEtiapine tablet 100 mg, 100 mg, Oral, QHS, Adeline May MD, 100 mg at 05/26/25 2128    rivaroxaban tablet 20 mg, 20 mg, Oral, Daily, Adeline May MD, 20 mg at 05/27/25 1619    sertraline tablet 100 mg, 100 mg, Oral, Daily, Adeline May MD, 100 mg at 05/27/25 0848    sodium chloride 0.9% flush 10 mL, 10 mL, Intravenous, Q12H PRN, Adeline May MD    vitamin D 1000 units tablet 1,000 Units, 1,000 Units, Oral, Daily, Adeline May MD, 1,000 Units at 05/27/25 0848    Facility-Administered Medications Ordered in Other Encounters:     0.9%  NaCl infusion, , Intravenous, Continuous, Shannon Milligan MD, Last Rate: 10 mL/hr at 03/09/23 1020, New Bag at 03/09/23 1020    LIDOcaine (PF) 10 mg/ml (1%) injection 10 mg, 1 mL, Intradermal, Once, Lanette Ulloa FNP    LIDOcaine (PF) 10 mg/ml (1%) injection 10 mg, 1 mL, Intradermal, Once, Shannon Milligan MD    ondansetron injection 4 mg, 4 mg, Intravenous, Once, Shannon Milligan MD    prochlorperazine injection Soln 5 mg, 5 mg, Intravenous, Once PRN, Shannon Milligan MD

## 2025-05-27 NOTE — PLAN OF CARE
Problem: Heart Failure  Goal: Optimal Coping  Outcome: Progressing  Goal: Optimal Cardiac Output  Outcome: Progressing  Goal: Stable Heart Rate and Rhythm  Outcome: Progressing  Goal: Optimal Functional Ability  Outcome: Progressing  Goal: Fluid and Electrolyte Balance  Outcome: Progressing  Goal: Improved Oral Intake  Outcome: Progressing  Goal: Effective Oxygenation and Ventilation  Outcome: Progressing  Goal: Effective Breathing Pattern During Sleep  Outcome: Progressing     Problem: Skin Injury Risk Increased  Goal: Skin Health and Integrity  Outcome: Progressing

## 2025-05-27 NOTE — PROGRESS NOTES
[facility] Progress Note     Resident Team: [facility] Medicine List 2  Attending: Merle Sun MD  Resident: Maco Bernard    Subjective:      Brief HPI:  Ran Reyes Jr. is a 69 y.o. male with PMH significant for tobacco dependence, COPD, HTN, pHTN, HLD, chronic persistent atrial fibrillation on Xarelto, nonobstructive CAD, NICM, HFrEF (EF 22%), PVD s/p stenting to superficial femoral artery and right tibial artery, Claudine's gangrene (2024), primary open-angle glaucoma presented to [facility] ED on 2025 with a primary complaint of generalized weakness and fatigue and edema to BLE x 3 days. Of note, discharged from this facility on 25 after admission for cardiogenic/septic shock. Adrenal insufficiency noted during that admission; discharged on prednisone taper. Documented inability to tolerate GDMT. Discharged with life vest. According to documentation, was discharged with HH. Today, patient states he lives alone and family members have not been coming to help regularly. States he has not eaten since yesterday afternoon as no family member came to bring him food. He did not take home meds today. He is requesting nursing home placement as he is no longer able to care for himself at home. Denies chest pain, shortness of breath or palpitations. No fever, chills, nausea, vomiting, diarrhea, or urinary symptoms.     Interval History:   Nurse reports of no overnight events. This AM, patient had breathing treatment. He has been eating/drinking okay. He denies any dysuria, fever, chills, increased urinary frequency. Pending Nursing home placement. Per [facility] declined acceptance b/c they do not accept patients with life vest. Will require ICD. Plan to discuss with cardiology for future management.     Review of Systems:  As stated above.     Objective:     Last 24 Hour Vital Signs:  BP  Min: 99/56  Max: 120/72  Temp  Av.3 °F (36.3 °C)  Min: 96.5 °F (35.8 °C)  Max: 97.9 °F (36.6 °C)  Pulse  Av.7   Min: 45  Max: 110  Resp  Av.7  Min: 17  Max: 19  SpO2  Av.1 %  Min: 91 %  Max: 99 %  Weight  Av.1 kg (209 lb 11.2 oz)  Min: 95.1 kg (209 lb 11.2 oz)  Max: 95.1 kg (209 lb 11.2 oz)  I/O last 3 completed shifts:  In: 1055 [P.O.:1055]  Out: 1000 [Urine:1000]    Physical Examination:  Physical Exam  Vitals reviewed.   Constitutional:       General: He is not in acute distress.     Appearance: Normal appearance. He is not ill-appearing, toxic-appearing or diaphoretic.   Neck:      Comments: JVD present  Cardiovascular:      Rate and Rhythm: Normal rate.      Heart sounds: Murmur heard.   Pulmonary:      Effort: Pulmonary effort is normal. No respiratory distress.      Breath sounds: Normal breath sounds. No stridor. No wheezing, rhonchi or rales.   Abdominal:      General: Bowel sounds are normal. There is no distension.      Palpations: Abdomen is soft.      Tenderness: There is no abdominal tenderness. There is no guarding or rebound.   Musculoskeletal:      Right lower leg: Edema present.      Left lower leg: Edema present.      Comments: 2 + pitting edema in bilateral lower extremities   Skin:     General: Skin is warm.   Neurological:      General: No focal deficit present.      Mental Status: He is alert and oriented to person, place, and time.         Laboratory:  Most Recent Data:  CBC:   Lab Results   Component Value Date    WBC 5.29 2025    HGB 8.1 (L) 2025    HCT 24.1 (L) 2025    PLT 89 (L) 2025    MCV 76.0 (L) 2025    RDW 25.7 (H) 2025     WBC Differential:   Recent Labs   Lab 25  1920 25  0328 25  0337 25  0346 25  0351   WBC 5.95 5.83 5.36 5.99 5.29   HGB 8.6* 8.2* 8.2* 8.1* 8.1*   HCT 25.9* 24.6* 24.9* 25.1* 24.1*    122* 124* 100* 89*   MCV 76.0* 75.7* 76.1* 78.2* 76.0*     BMP:   Lab Results   Component Value Date     (L) 2025    K 4.7 2025    CL 95 (L) 2025    CO2 20 (L) 2025    BUN 55.4  (H) 05/27/2025    CREATININE 1.75 (H) 05/27/2025     (H) 05/27/2025    CALCIUM 9.2 05/27/2025    MG 2.30 05/27/2025    PHOS 4.0 05/27/2025     LFTs:   Lab Results   Component Value Date    PROT 7.3 05/27/2025    ALBUMIN 3.5 05/27/2025    BILITOT 5.2 (H) 05/27/2025    AST 51 (H) 05/27/2025    ALKPHOS 344 (H) 05/27/2025    ALT 52 05/27/2025     Coags:   Lab Results   Component Value Date    INR 3.8 (H) 03/07/2025    PROTIME 38.1 (H) 03/07/2025     FLP:   Lab Results   Component Value Date    CHOL 96 08/14/2024    HDL 32 (L) 08/14/2024    TRIG 73 08/14/2024     DM:   Lab Results   Component Value Date    HGBA1C 5.0 03/18/2024    HGBA1C 4.7 11/15/2021    HGBA1C 5.7 10/07/2020    CREATININE 1.75 (H) 05/27/2025     Thyroid:   Lab Results   Component Value Date    TSH 1.097 03/18/2024      Anemia:   Lab Results   Component Value Date    IRON 22 (L) 04/22/2025    TIBC 297 04/22/2025    FERRITIN 51.82 04/22/2025       Lab Results   Component Value Date    CVCZBCIZ02 >2,000 (H) 04/22/2025       Lab Results   Component Value Date    FOLATE 18.5 04/22/2025        Cardiac:   Lab Results   Component Value Date    TROPONINI 0.048 (H) 05/24/2025    BNP 2,615.6 (H) 05/23/2025         Microbiology Data:  Microbiology Results (last 7 days)       Procedure Component Value Units Date/Time    Blood Culture #1 **CANNOT BE ORDERED STAT** [[id number]]  (Normal) Collected: 05/23/25 2127    Order Status: Completed Specimen: Blood from Hand, Right Updated: 05/26/25 1000     Blood Culture No Growth At 48 Hours    Blood Culture #2 **CANNOT BE ORDERED STAT** [[id number]]  (Normal) Collected: 05/23/25 2136    Order Status: Completed Specimen: Blood Updated: 05/26/25 1000     Blood Culture No Growth At 48 Hours    Urine culture [[id number]]  (Abnormal)  (Susceptibility) Collected: 05/23/25 1955    Order Status: Completed Specimen: Urine Updated: 05/26/25 0823     Urine Culture >/= 100,000 colonies/ml Escherichia coli ESBL              Radiology:  Imaging Results              X-Ray Chest 1 View (Final result)  Result time 05/23/25 19:54:58      Final result by Ramana Mcleod MD (05/23/25 19:54:58)                   Narrative:    EXAMINATION  XR CHEST 1 VIEW    CLINICAL HISTORY  weakness;    TECHNIQUE  A total of 1 frontal image(s) submitted of the chest.    COMPARISON  4 May 2025    FINDINGS  Lines/tubes/devices: ECG leads overlie the imaged region.  Right PICC no longer visualized.    There is similar enlargement the cardiac silhouette, central vascular congestion, and widespread lung interstitial changes.  The trachea is midline. No new or worsening consolidation is developed in the interval.  There is no large pleural effusion or convincing pneumothorax.    Regional osseous structures and extrathoracic soft tissues are similar.    IMPRESSION  No acute process or other adverse interval change.  Chronic secondary findings discussed above.      Electronically signed by: Ramana Mcleod  Date:    05/23/2025  Time:    19:54                                    Current Medications:     Infusions:       Scheduled:   atorvastatin  80 mg Oral Daily    bumetanide  1 mg Oral Every other day    clopidogreL  75 mg Oral Daily    gabapentin  300 mg Oral TID    nitrofurantoin (macrocrystal-monohydrate)  100 mg Oral Q12H    pantoprazole  40 mg Oral Daily    predniSONE  15 mg Oral BID    Followed by    [START ON 6/2/2025] predniSONE  10 mg Oral BID    Followed by    [START ON 6/9/2025] predniSONE  5 mg Oral BID    QUEtiapine  100 mg Oral QHS    rivaroxaban  20 mg Oral Daily    sertraline  100 mg Oral Daily    vitamin D  1,000 Units Oral Daily        PRN:    Current Facility-Administered Medications:     albuterol-ipratropium, 3 mL, Nebulization, Q4H PRN    dextrose 50%, 12.5 g, Intravenous, PRN    dextrose 50%, 25 g, Intravenous, PRN    glucagon (human recombinant), 1 mg, Intramuscular, PRN    glucose, 16 g, Oral, PRN    glucose, 24 g, Oral, PRN    insulin  aspart U-100, 0-5 Units, Subcutaneous, QID (AC + HS) PRN    melatonin, 6 mg, Oral, Nightly PRN    naloxone, 0.02 mg, Intravenous, PRN    sodium chloride 0.9%, 10 mL, Intravenous, Q12H PRN      Assessment & Plan:     UTI   Difficulty urinating  - Afebrile, no leukocytosis, and asymptomatic  - Lactate 2.8; f/u trended down  - Urine cx grew ecoli ESBL. Upon chart review, patient received Rocephin x 2 doses. However, abx was discontinued as patient was asymptomatic.  - Patient c/o difficulty urinating this AM. Will do bladder scan. Macrobid initiated based on urine cx sensitivities. Consider escalating to Merem if symptoms not controlled.       ELIZABETH  Hyponatremia  Anion gap metabolic acidosis  - Suspect initial anion gap in setting of elevated lactic acid  - Hyponatremia potentially multifactorial with chronicity noted on prior admissions. Appears asymptomatic at this time.   - Potential contributions: hypervolemic hyponatremia d/t CHF vs hypovolemia-related d/t poor oral intake/diuretic use vs adrenal insufficiency  - Prerenal ELIZABETH possibly 2/2 poor intake  - Resume oral intake. S/p 1L LR in ED. Replete volume judiciously in setting of HFrEF.  - Continue electrolyte goals: K> 4 Mag > 3 Phos > 2        Troponinemia  Chronic persistent A-fib   Nonobstructive CAD (per 3/2024 [facility]), NICM  HFrEF   HTN  pHTN  PVD   HLD  - Troponin 0.057. EKG with afib (rate 98) with PVCs; T wave inversions in 1 and aVL (noted on priors); ST abnormalities in V1 and V3 (noted on priors). No chest pain or dynamic EKG changes. F/U trops continued with downward trend  - BNP 2615 (down from 3765 on 5/13)  - CXR demonstrating cardiomegaly, central vascular congestion and chronic interstitial changes; on my read vascular congestion improved from recent hospitalization.   -TTE 4/2025: EF 22% with PASP 59 mm Hg and grade 3 diastolic dysfunction   - Continue home Bumex PO 1mg daily. Consider changing to IV and daily dosing if begins to retain more fluid.    - Daily weights, strict I/O  - Documented inability to tolerate GDMT at last admission.   - Initiate Coreg 3.125 mg bid  - Continue anticoagulation for CVA risk reduction in setting of atrial fibrillation.    - Continue SAPT and high-intensity statin therapy  - Cardiac monitoring; defibrillator leads attached due to bedbug infestation of LifeVest wearable defibrillator       Adrenal insufficiency  - Continue prednisone taper (initiated during recent hospitalization)       COPD  - Duonebs q4h PRN       Anxiety/insomnia   - Continue home Quetiapine 100 mg qhs,sertraline 100mg qd, and gabapentin 300mg TID        CODE STATUS: Full Code  Access: Peripheral  Antibiotics: Macrobid  Diet: Heart healtyhy  DVT Prophylaxis: Xarelto  GI Prophylaxis: None  Fluids: none    Disposition: day 3 of admission for deconditioning. Pending Nursing home placement. Patient also with severe CHF - on home Bumex 1mg Q every other day.       Roldan Cisneros MD  [facility], [facility], HO-1  05/27/2025

## 2025-05-28 PROBLEM — K74.60 LIVER CIRRHOSIS: Status: ACTIVE | Noted: 2025-05-28

## 2025-05-28 LAB
ALBUMIN SERPL-MCNC: 3.4 G/DL (ref 3.4–4.8)
ALBUMIN/GLOB SERPL: 0.9 RATIO (ref 1.1–2)
ALP SERPL-CCNC: 383 UNIT/L (ref 40–150)
ALT SERPL-CCNC: 61 UNIT/L (ref 0–55)
AMMONIA PLAS-MSCNC: 41.6 UMOL/L (ref 18–72)
ANION GAP SERPL CALC-SCNC: 14 MEQ/L
APTT PPP: 39.2 SECONDS (ref 23.2–33.7)
AST SERPL-CCNC: 66 UNIT/L (ref 11–45)
BACTERIA #/AREA URNS AUTO: ABNORMAL /HPF
BASOPHILS # BLD AUTO: 0.01 X10(3)/MCL
BASOPHILS NFR BLD AUTO: 0.2 %
BILIRUB DIRECT SERPL-MCNC: 3.7 MG/DL (ref 0–?)
BILIRUB INDIRECT SERPL-MCNC: 2.1 MG/DL (ref 0–0.8)
BILIRUB SERPL-MCNC: 5.8 MG/DL
BILIRUB SERPL-MCNC: 5.8 MG/DL
BILIRUB UR QL STRIP.AUTO: NEGATIVE
BUN SERPL-MCNC: 60 MG/DL (ref 8.4–25.7)
CALCIUM SERPL-MCNC: 9.4 MG/DL (ref 8.8–10)
CHLORIDE SERPL-SCNC: 96 MMOL/L (ref 98–107)
CLARITY UR: ABNORMAL
CO2 SERPL-SCNC: 21 MMOL/L (ref 23–31)
COLOR UR AUTO: YELLOW
CREAT SERPL-MCNC: 1.86 MG/DL (ref 0.72–1.25)
CREAT/UREA NIT SERPL: 32
EOSINOPHIL # BLD AUTO: 0 X10(3)/MCL (ref 0–0.9)
EOSINOPHIL NFR BLD AUTO: 0 %
ERYTHROCYTE [DISTWIDTH] IN BLOOD BY AUTOMATED COUNT: 26.2 % (ref 11.5–17)
FERRITIN SERPL-MCNC: 190.16 NG/ML (ref 21.81–274.66)
GFR SERPLBLD CREATININE-BSD FMLA CKD-EPI: 39 ML/MIN/1.73/M2
GLOBULIN SER-MCNC: 3.8 GM/DL (ref 2.4–3.5)
GLUCOSE SERPL-MCNC: 132 MG/DL (ref 82–115)
GLUCOSE UR QL STRIP: NORMAL
HAV IGM SERPL QL IA: NONREACTIVE
HBV CORE IGM SERPL QL IA: NONREACTIVE
HBV SURFACE AG SERPL QL IA: NONREACTIVE
HCT VFR BLD AUTO: 24.8 % (ref 42–52)
HCV AB SERPL QL IA: NONREACTIVE
HGB BLD-MCNC: 8.2 G/DL (ref 14–18)
HGB UR QL STRIP: ABNORMAL
HOLD SPECIMEN: NORMAL
HYALINE CASTS #/AREA URNS LPF: ABNORMAL /LPF
IMM GRANULOCYTES # BLD AUTO: 0.04 X10(3)/MCL (ref 0–0.04)
IMM GRANULOCYTES NFR BLD AUTO: 0.8 %
INR PPP: 3.4
IRON SATN MFR SERPL: 11 % (ref 20–50)
IRON SERPL-MCNC: 34 UG/DL (ref 65–175)
KETONES UR QL STRIP: NEGATIVE
LEUKOCYTE ESTERASE UR QL STRIP: 250
LYMPHOCYTES # BLD AUTO: 0.51 X10(3)/MCL (ref 0.6–4.6)
LYMPHOCYTES NFR BLD AUTO: 10.1 %
MAGNESIUM SERPL-MCNC: 2.5 MG/DL (ref 1.6–2.6)
MCH RBC QN AUTO: 25.9 PG (ref 27–31)
MCHC RBC AUTO-ENTMCNC: 33.1 G/DL (ref 33–36)
MCV RBC AUTO: 78.5 FL (ref 80–94)
MONOCYTES # BLD AUTO: 0.75 X10(3)/MCL (ref 0.1–1.3)
MONOCYTES NFR BLD AUTO: 14.9 %
MUCOUS THREADS URNS QL MICRO: ABNORMAL /LPF
NEUTROPHILS # BLD AUTO: 3.72 X10(3)/MCL (ref 2.1–9.2)
NEUTROPHILS NFR BLD AUTO: 74 %
NITRITE UR QL STRIP: NEGATIVE
NRBC BLD AUTO-RTO: 0.6 %
PH UR STRIP: 6 [PH]
PHOSPHATE SERPL-MCNC: 3.8 MG/DL (ref 2.3–4.7)
PLATELET # BLD AUTO: 77 X10(3)/MCL (ref 130–400)
PLATELETS.RETICULATED NFR BLD AUTO: 4.4 % (ref 0.9–11.2)
PMV BLD AUTO: ABNORMAL FL
POTASSIUM SERPL-SCNC: 5.1 MMOL/L (ref 3.5–5.1)
PROT SERPL-MCNC: 7.2 GM/DL (ref 5.8–7.6)
PROT UR QL STRIP: ABNORMAL
PROTHROMBIN TIME: 35.2 SECONDS (ref 11.4–14)
RBC # BLD AUTO: 3.16 X10(6)/MCL (ref 4.7–6.1)
RBC #/AREA URNS AUTO: ABNORMAL /HPF
SODIUM SERPL-SCNC: 131 MMOL/L (ref 136–145)
SP GR UR STRIP.AUTO: 1.02 (ref 1–1.03)
SQUAMOUS #/AREA URNS LPF: ABNORMAL /HPF
TIBC SERPL-MCNC: 283 UG/DL (ref 60–240)
TIBC SERPL-MCNC: 317 UG/DL (ref 250–450)
TRANSFERRIN SERPL-MCNC: 316 MG/DL (ref 163–344)
UROBILINOGEN UR STRIP-ACNC: NORMAL
WBC # BLD AUTO: 5.03 X10(3)/MCL (ref 4.5–11.5)
WBC #/AREA URNS AUTO: ABNORMAL /HPF
WBC CLUMPS UR QL AUTO: ABNORMAL

## 2025-05-28 PROCEDURE — 81015 MICROSCOPIC EXAM OF URINE: CPT

## 2025-05-28 PROCEDURE — 25000003 PHARM REV CODE 250

## 2025-05-28 PROCEDURE — 83550 IRON BINDING TEST: CPT

## 2025-05-28 PROCEDURE — 82728 ASSAY OF FERRITIN: CPT

## 2025-05-28 PROCEDURE — 80053 COMPREHEN METABOLIC PANEL: CPT

## 2025-05-28 PROCEDURE — 82248 BILIRUBIN DIRECT: CPT

## 2025-05-28 PROCEDURE — 83735 ASSAY OF MAGNESIUM: CPT

## 2025-05-28 PROCEDURE — 99900035 HC TECH TIME PER 15 MIN (STAT)

## 2025-05-28 PROCEDURE — 94640 AIRWAY INHALATION TREATMENT: CPT

## 2025-05-28 PROCEDURE — 85610 PROTHROMBIN TIME: CPT

## 2025-05-28 PROCEDURE — 97530 THERAPEUTIC ACTIVITIES: CPT

## 2025-05-28 PROCEDURE — 82140 ASSAY OF AMMONIA: CPT

## 2025-05-28 PROCEDURE — 84100 ASSAY OF PHOSPHORUS: CPT

## 2025-05-28 PROCEDURE — 36415 COLL VENOUS BLD VENIPUNCTURE: CPT

## 2025-05-28 PROCEDURE — 11000001 HC ACUTE MED/SURG PRIVATE ROOM

## 2025-05-28 PROCEDURE — 85730 THROMBOPLASTIN TIME PARTIAL: CPT

## 2025-05-28 PROCEDURE — 63600175 PHARM REV CODE 636 W HCPCS: Mod: JZ,TB

## 2025-05-28 PROCEDURE — 80321 ALCOHOLS BIOMARKERS 1OR 2: CPT

## 2025-05-28 PROCEDURE — 27000207 HC ISOLATION

## 2025-05-28 PROCEDURE — 25000242 PHARM REV CODE 250 ALT 637 W/ HCPCS

## 2025-05-28 PROCEDURE — 80074 ACUTE HEPATITIS PANEL: CPT

## 2025-05-28 PROCEDURE — 85025 COMPLETE CBC W/AUTO DIFF WBC: CPT

## 2025-05-28 PROCEDURE — 63600175 PHARM REV CODE 636 W HCPCS

## 2025-05-28 RX ORDER — THIAMINE HCL 100 MG
100 TABLET ORAL DAILY
Status: DISCONTINUED | OUTPATIENT
Start: 2025-05-28 | End: 2025-05-29

## 2025-05-28 RX ORDER — BUMETANIDE 0.25 MG/ML
2 INJECTION, SOLUTION INTRAMUSCULAR; INTRAVENOUS DAILY
Status: DISCONTINUED | OUTPATIENT
Start: 2025-05-28 | End: 2025-05-29

## 2025-05-28 RX ORDER — FOLIC ACID 1 MG/1
1 TABLET ORAL DAILY
Status: DISCONTINUED | OUTPATIENT
Start: 2025-05-28 | End: 2025-06-05 | Stop reason: HOSPADM

## 2025-05-28 RX ORDER — BUMETANIDE 0.25 MG/ML
1 INJECTION, SOLUTION INTRAMUSCULAR; INTRAVENOUS DAILY
Status: DISCONTINUED | OUTPATIENT
Start: 2025-05-28 | End: 2025-05-28

## 2025-05-28 RX ORDER — QUETIAPINE FUMARATE 25 MG/1
75 TABLET, FILM COATED ORAL NIGHTLY
Status: DISCONTINUED | OUTPATIENT
Start: 2025-05-28 | End: 2025-06-01

## 2025-05-28 RX ORDER — METOLAZONE 5 MG/1
10 TABLET ORAL ONCE
Status: COMPLETED | OUTPATIENT
Start: 2025-05-28 | End: 2025-05-28

## 2025-05-28 RX ADMIN — PANTOPRAZOLE SODIUM 40 MG: 40 TABLET, DELAYED RELEASE ORAL at 09:05

## 2025-05-28 RX ADMIN — FOLIC ACID 1 MG: 1 TABLET ORAL at 09:05

## 2025-05-28 RX ADMIN — RIVAROXABAN 20 MG: 10 TABLET, FILM COATED ORAL at 05:05

## 2025-05-28 RX ADMIN — BUMETANIDE 2 MG: 0.25 INJECTION INTRAMUSCULAR; INTRAVENOUS at 02:05

## 2025-05-28 RX ADMIN — PREDNISONE 15 MG: 5 TABLET ORAL at 09:05

## 2025-05-28 RX ADMIN — QUETIAPINE FUMARATE 75 MG: 25 TABLET ORAL at 09:05

## 2025-05-28 RX ADMIN — NITROFURANTOIN (MONOHYDRATE/MACROCRYSTALS) 100 MG: 25; 75 CAPSULE ORAL at 09:05

## 2025-05-28 RX ADMIN — Medication 100 MG: at 09:05

## 2025-05-28 RX ADMIN — IPRATROPIUM BROMIDE AND ALBUTEROL SULFATE 3 ML: 2.5; .5 SOLUTION RESPIRATORY (INHALATION) at 07:05

## 2025-05-28 RX ADMIN — METOLAZONE 10 MG: 5 TABLET ORAL at 02:05

## 2025-05-28 RX ADMIN — CARVEDILOL 3.12 MG: 3.12 TABLET, FILM COATED ORAL at 09:05

## 2025-05-28 RX ADMIN — ATORVASTATIN CALCIUM 80 MG: 40 TABLET, FILM COATED ORAL at 09:05

## 2025-05-28 RX ADMIN — SERTRALINE HYDROCHLORIDE 100 MG: 50 TABLET ORAL at 09:05

## 2025-05-28 RX ADMIN — IPRATROPIUM BROMIDE AND ALBUTEROL SULFATE 3 ML: 2.5; .5 SOLUTION RESPIRATORY (INHALATION) at 03:05

## 2025-05-28 RX ADMIN — Medication 1000 UNITS: at 09:05

## 2025-05-28 RX ADMIN — IPRATROPIUM BROMIDE AND ALBUTEROL SULFATE 3 ML: 2.5; .5 SOLUTION RESPIRATORY (INHALATION) at 11:05

## 2025-05-28 RX ADMIN — CLOPIDOGREL BISULFATE 75 MG: 75 TABLET, FILM COATED ORAL at 09:05

## 2025-05-28 NOTE — PT/OT/SLP PROGRESS
Physical Therapy Treatment    Patient Name:  Ran Reyes Jr.   MRN:  [id number]    Recommendations     Therapy Intensity Recommendations at Discharge: Moderate Intensity Therapy  Discharge Equipment Recommendations:  (TBD pending progress)   Barriers to discharge: fall risk, level of skilled assistance required, and decreased endurance    Assessment     Ran Reyes Jr. is a 69 y.o. male admitted with a medical diagnosis of   1. Chronic congestive heart failure, unspecified heart failure type    2. Congestive heart failure, unspecified HF chronicity, unspecified heart failure type    3. Weakness    4. Longstanding persistent atrial fibrillation    5. ELIZABETH (acute kidney injury)    6. Acute cystitis without hematuria    7. Unable to care for self    8. Chest pain    9. Elevated troponin    10. Arteriosclerosis of coronary artery    11. Hepatic cirrhosis, unspecified hepatic cirrhosis type, unspecified whether ascites present       Problem List[1]   He presents with the following impairments/functional limitations:  weakness, impaired endurance, impaired self care skills, impaired functional mobility, decreased safety awareness, gait instability, impaired balance, decreased lower extremity function.    Rehab Prognosis: Fair.    Patient would benefit from continued skilled acute PT services to: address above listed impairments/functional limitations; receive patient/caregiver education; reduce fall risk; and maximize independency/safety with functional mobility.    Recent Surgery: * No surgery found *      Plan     During this hospitalization, patient to be seen 5 x/week to address the identified impairments/functional limitations via gait training, therapeutic activities, therapeutic exercises and progress toward the established goals.    Plan of Care Expires:   (Discharge)    Subjective     Communicated with patient's nurse Carolynn prior to session.    Patient agreeable to participate in treatment  session.    Chief Complaint: none  Patient/Family Comments/goals: none stated this session  Pain/Comfort:  Pain Rating 1: 0/10  Pain Rating Post-Intervention 1: 0/10    Objective     Patient found sitting edge of bed with    upon PT entry to room.    General Precautions: Standard, fall   Orthopedic Precautions:N/A   Braces:  N/A  Respiratory Status: room air    Functional Mobility:    Bed Mobility:  Sit to Supine: minimum assistance    Transfers:  Sit to Stand: moderate assistance and of 2 persons with rolling walker. Multiple trials.     Gait:  Patient ambulated 6 ft with hand-held assist and rolling walker and moderate assistance and of 2 persons.  Patient demonstrates :       unsteady gait       decreased susie       decreased bilateral step length       ambulates outside CARLOS of RW       flexed posture       shuffle gait      slowed, guarded, time consuming movements - all transitional activities.    Other Mobility:N/A      Patient left supine in bed and with HOB elevated with all lines intact, call button in reach, tray table at bedside, patient's nurse notified, and Nancy RN present.    DME Justifications:  No DME recommended requiring DME justifications    Education     Patient educated on and assisted with functional mobility as noted above.  Patient educated on PT Plan of Care.  Patient was instructed to utilize staff assistance for mobility/transfers.  White board updated regarding patient's safest level of mobility with staff assistance    Goals     Multidisciplinary Problems       Physical Therapy Goals          Problem: Physical Therapy    Goal Priority Disciplines Outcome Interventions   Physical Therapy Goal     PT, PT/OT Progressing    Description: Goals to be met by: Discharge      Patient will increase functional independence with mobility by performin. Sit to stand transfer with Supervision  2. Bed to chair transfer with Supervision using Rolling Walker  3. Gait  x 130 feet with  Stand-by Assistance using Rolling Walker vs Rollator.   4. Increased functional strength to 4/5 for B LE.  Reviewed 5/28/2025                         Time Tracking     PT Received On: 05/28/25  PT Start Time: 1316     PT Stop Time: 1339  PT Total Time (min): 23 min     Billable Minutes: Therapeutic Activity 23 05/28/2025       [1]   Patient Active Problem List  Diagnosis    Primary open angle glaucoma (POAG) of right eye, moderate stage    Combined forms of age-related cataract of left eye    Arteriosclerosis of coronary artery    Chronic atrial fibrillation    Dyslipidemia    Hypertension    Tobacco use    PVD (peripheral vascular disease)    VHD (valvular heart disease)    COVID-19    HFrEF (heart failure with reduced ejection fraction)    Nonrheumatic mitral valve regurgitation    Postoperative eye state    Scrotal hematoma    Chronic obstructive pulmonary disease, unspecified    Lesion of external ear    Low back pain    Other thrombophilia    Secondary hyperaldosteronism    Positive colorectal cancer screening using Cologuard test    Acute heart failure    History of COPD    CHF (congestive heart failure)    Acute cystitis with hematuria    Tobacco dependency    Liver cirrhosis

## 2025-05-28 NOTE — PROGRESS NOTES
"[facility] Progress Note     Resident Team: [facility] Medicine List 2  Attending: Merle Sun MD  Resident: Maco Bernard    Subjective:      Brief HPI:  Ran Reyes Jr. is a 69 y.o. 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 Of note, discharged from this facility on 5/20/25 after admission for cardiogenic/septic shock. Adrenal insufficiency noted during that admission; discharged on prednisone taper. Documented inability to tolerate GDMT. Discharged with life vest. According to documentation, was discharged with HH. Today, patient states he lives alone and family members have not been coming to help regularly. States he has not eaten since yesterday afternoon as no family member came to bring him food. He did not take home meds today. He is requesting nursing home placement as he is no longer able to care for himself at home. Denies chest pain, shortness of breath or palpitations. No fever, chills, nausea, vomiting, diarrhea, or urinary symptoms.     Interval History:   Nurse reported patient urinated on himself. Patient looked somnolent. Some urine in urinal looked dark. Patient stated "Dog in the room." He also reported drinking alcohol and last drink was 2 weeks ago. Discontinued gabapentin. Additional labs showed ammonia level normal.  Bilirubin total, direct and indirect elevated (direct > indirect). PT and INR levels increased. Hepatitis panel, urinalysis and PETH pending. RUQ US pending. BLE edema. Switched to Bumex 1 mg IV. Per Cardiology, patient currently at high risk for ICD implantation. He may benefit from EP evaluation. Nephrology " consulted due to worsen ELIZABETH.     Review of Systems:  As stated above.     Objective:     Last 24 Hour Vital Signs:  BP  Min: 96/70  Max: 111/78  Temp  Av.5 °F (36.4 °C)  Min: 97.4 °F (36.3 °C)  Max: 97.6 °F (36.4 °C)  Pulse  Av.4  Min: 62  Max: 95  Resp  Av.6  Min: 17  Max: 18  SpO2  Av.4 %  Min: 92 %  Max: 100 %  Weight  Av.7 kg (180 lb 0.8 oz)  Min: 80.7 kg (178 lb)  Max: 82.6 kg (182 lb 1.6 oz)  I/O last 3 completed shifts:  In:  [P.O.:]  Out: 1000 [Urine:1000]    Physical Examination:  Physical Exam  Vitals reviewed.   Constitutional:       General: He is not in acute distress.     Appearance: Normal appearance. He is not ill-appearing, toxic-appearing or diaphoretic.   Neck:      Comments: JVD present  Cardiovascular:      Rate and Rhythm: Normal rate.      Heart sounds: Murmur heard.   Pulmonary:      Effort: Pulmonary effort is normal. No respiratory distress.      Breath sounds: Normal breath sounds. No stridor. No wheezing, rhonchi or rales.   Abdominal:      General: Bowel sounds are normal. There is no distension.      Palpations: Abdomen is soft.      Tenderness: There is no abdominal tenderness. There is no guarding or rebound.   Musculoskeletal:      Right lower leg: Edema present.      Left lower leg: Edema present.      Comments: 2 + pitting edema in bilateral lower extremities   Skin:     General: Skin is warm.   Neurological:      General: No focal deficit present.      Mental Status: He is alert and oriented to person, place, and time.         Laboratory:  Most Recent Data:  CBC:   Lab Results   Component Value Date    WBC 5.03 2025    HGB 8.2 (L) 2025    HCT 24.8 (L) 2025    PLT 77 (L) 2025    MCV 78.5 (L) 2025    RDW 26.2 (H) 2025     WBC Differential:   Recent Labs   Lab 25  0328 25  0337 25  0346 25  0351 25  0435   WBC 5.83 5.36 5.99 5.29 5.03   HGB 8.2* 8.2* 8.1* 8.1* 8.2*   HCT 24.6* 24.9*  25.1* 24.1* 24.8*   * 124* 100* 89* 77*   MCV 75.7* 76.1* 78.2* 76.0* 78.5*     BMP:   Lab Results   Component Value Date     (L) 05/28/2025    K 5.1 05/28/2025    CL 96 (L) 05/28/2025    CO2 21 (L) 05/28/2025    BUN 60.0 (H) 05/28/2025    CREATININE 1.86 (H) 05/28/2025     (H) 05/28/2025    CALCIUM 9.4 05/28/2025    MG 2.50 05/28/2025    PHOS 3.8 05/28/2025     LFTs:   Lab Results   Component Value Date    PROT 7.2 05/28/2025    ALBUMIN 3.4 05/28/2025    BILITOT 5.8 (H) 05/28/2025    AST 66 (H) 05/28/2025    ALKPHOS 383 (H) 05/28/2025    ALT 61 (H) 05/28/2025     Coags:   Lab Results   Component Value Date    INR 3.8 (H) 03/07/2025    PROTIME 38.1 (H) 03/07/2025     FLP:   Lab Results   Component Value Date    CHOL 96 08/14/2024    HDL 32 (L) 08/14/2024    TRIG 73 08/14/2024     DM:   Lab Results   Component Value Date    HGBA1C 5.0 03/18/2024    HGBA1C 4.7 11/15/2021    HGBA1C 5.7 10/07/2020    CREATININE 1.86 (H) 05/28/2025     Thyroid:   Lab Results   Component Value Date    TSH 1.097 03/18/2024      Anemia:   Lab Results   Component Value Date    IRON 22 (L) 04/22/2025    TIBC 297 04/22/2025    FERRITIN 51.82 04/22/2025       Lab Results   Component Value Date    FVTLBITY17 >2,000 (H) 04/22/2025       Lab Results   Component Value Date    FOLATE 18.5 04/22/2025        Cardiac:   Lab Results   Component Value Date    TROPONINI 0.048 (H) 05/24/2025    BNP 2,615.6 (H) 05/23/2025         Microbiology Data:  Microbiology Results (last 7 days)       Procedure Component Value Units Date/Time    Blood Culture #2 **CANNOT BE ORDERED STAT** [[id number]]  (Normal) Collected: 05/23/25 2136    Order Status: Completed Specimen: Blood Updated: 05/27/25 1000     Blood Culture No Growth At 72 Hours    Blood Culture #1 **CANNOT BE ORDERED STAT** [[id number]]  (Normal) Collected: 05/23/25 2127    Order Status: Completed Specimen: Blood from Hand, Right Updated: 05/27/25 1000     Blood Culture No Growth At 72  Hours    Urine culture [[id number]]  (Abnormal)  (Susceptibility) Collected: 05/23/25 1955    Order Status: Completed Specimen: Urine Updated: 05/26/25 0823     Urine Culture >/= 100,000 colonies/ml Escherichia coli ESBL             Radiology:  Imaging Results              X-Ray Chest 1 View (Final result)  Result time 05/23/25 19:54:58      Final result by Ramana Mcleod MD (05/23/25 19:54:58)                   Narrative:    EXAMINATION  XR CHEST 1 VIEW    CLINICAL HISTORY  weakness;    TECHNIQUE  A total of 1 frontal image(s) submitted of the chest.    COMPARISON  4 May 2025    FINDINGS  Lines/tubes/devices: ECG leads overlie the imaged region.  Right PICC no longer visualized.    There is similar enlargement the cardiac silhouette, central vascular congestion, and widespread lung interstitial changes.  The trachea is midline. No new or worsening consolidation is developed in the interval.  There is no large pleural effusion or convincing pneumothorax.    Regional osseous structures and extrathoracic soft tissues are similar.    IMPRESSION  No acute process or other adverse interval change.  Chronic secondary findings discussed above.      Electronically signed by: Ramana Mcleod  Date:    05/23/2025  Time:    19:54                                    Current Medications:     Infusions:       Scheduled:   atorvastatin  80 mg Oral Daily    bumetanide  1 mg Oral Daily    carvediloL  3.125 mg Oral BID    clopidogreL  75 mg Oral Daily    gabapentin  300 mg Oral TID    nitrofurantoin (macrocrystal-monohydrate)  100 mg Oral Q12H    pantoprazole  40 mg Oral Daily    predniSONE  15 mg Oral BID    Followed by    [START ON 6/2/2025] predniSONE  10 mg Oral BID    Followed by    [START ON 6/9/2025] predniSONE  5 mg Oral BID    QUEtiapine  100 mg Oral QHS    rivaroxaban  20 mg Oral Daily    sertraline  100 mg Oral Daily    vitamin D  1,000 Units Oral Daily        PRN:    Current Facility-Administered Medications:      albuterol-ipratropium, 3 mL, Nebulization, Q4H PRN    dextrose 50%, 12.5 g, Intravenous, PRN    dextrose 50%, 25 g, Intravenous, PRN    glucagon (human recombinant), 1 mg, Intramuscular, PRN    glucose, 16 g, Oral, PRN    glucose, 24 g, Oral, PRN    insulin aspart U-100, 0-5 Units, Subcutaneous, QID (AC + HS) PRN    melatonin, 6 mg, Oral, Nightly PRN    naloxone, 0.02 mg, Intravenous, PRN    sodium chloride 0.9%, 10 mL, Intravenous, Q12H PRN      Assessment & Plan:     UTI   Difficulty urinating  - Afebrile, no leukocytosis, and asymptomatic  - Lactate 2.8; f/u trended down  - Urine cx grew ecoli ESBL. Upon chart review, patient received Rocephin x 2 doses. However, abx was discontinued as patient was asymptomatic.  - Patient c/o difficulty urinating this AM. Will do bladder scan. Macrobid initiated based on urine cx sensitivities. Consider escalating to Merem if symptoms not controlled.       ELIZABETH  Hyponatremia  Anion gap metabolic acidosis  - Suspect initial anion gap in setting of elevated lactic acid  - Hyponatremia potentially multifactorial with chronicity noted on prior admissions. Appears asymptomatic at this time.   - Potential contributions: hypervolemic hyponatremia d/t CHF vs hypovolemia-related d/t poor oral intake/diuretic use vs adrenal insufficiency  - Prerenal ELIZABETH possibly 2/2 poor intake  - Resume oral intake. S/p 1L LR in ED. Replete volume judiciously in setting of HFrEF.  - Continue electrolyte goals: K> 4 Mag > 3 Phos > 2        Troponinemia  Chronic persistent A-fib   Nonobstructive CAD (per 3/2024 [facility]), NICM  HFrEF   HTN  pHTN  PVD   HLD  - Troponin 0.057. EKG with afib (rate 98) with PVCs; T wave inversions in 1 and aVL (noted on priors); ST abnormalities in V1 and V3 (noted on priors). No chest pain or dynamic EKG changes. F/U trops continued with downward trend  - BNP 2615 (down from 3765 on 5/13)  - CXR demonstrating cardiomegaly, central vascular congestion and chronic interstitial  changes; on my read vascular congestion improved from recent hospitalization.   -TTE 4/2025: EF 22% with PASP 59 mm Hg and grade 3 diastolic dysfunction   - Continue home Bumex PO 1mg daily. Consider changing to IV and daily dosing if begins to retain more fluid.   - Daily weights, strict I/O  - Documented inability to tolerate GDMT at last admission.   - Initiate Coreg 3.125 mg bid  - Continue anticoagulation for CVA risk reduction in setting of atrial fibrillation.    - Continue SAPT and high-intensity statin therapy  - Cardiac monitoring; defibrillator leads attached due to bedbug infestation of [employer] wearable defibrillator       Adrenal insufficiency  - Continue prednisone taper (initiated during recent hospitalization)       COPD  - Duonebs q4h PRN       Anxiety/insomnia   - Continue home Quetiapine 100 mg qhs and sertraline 100mg qd      CODE STATUS: Full Code  Access: Peripheral  Antibiotics: Macrobid  Diet: Heart healtyhy  DVT Prophylaxis: Xarelto  GI Prophylaxis: None  Fluids: none    Disposition: day 4 of admission for deconditioning. Pending Nursing home placement. Patient also with severe CHF - on home Bumex 1mg Q every other day.       Roldan Cisneros MD  [facility], HO-1  05/28/2025

## 2025-05-28 NOTE — CONSULTS
Nephrology Consultation    Date of Consultation: 5/28/2025    Consultation Requested By: Dr. Roldan Cisneros    Reason for Consultation: ELIZABETH    History of Present Illness:  This is a 69 y.o. male with PMH of HFrEF, nonobstructive CAD, HTN, COPD, PVD, HLD, persistent atrial fibrillation on Xarelto who presented for generalized weakness, fatigue, and worsening bilateral LE edema on 5/23/25. He was recently discharged on 5/20/25 from an admission for cardiogenic shock noted to have adrenal insufficiency. Since admission he has been on PO Bumex 1 mg every other day with no improvement in his edema. His renal function has been gradually worsening with BUN/Cr now 60/1.8. Per chart review he is unable to care for himself at home and has no assistance from family therefore his oral intake has been poor at home. He has become more lethargic over the past few days. He is hyponatremic which is improving and is being treated for adrenal insufficiency. Nephrology was consulted for management of acute kidney injury.    Review of patient's allergies indicates:  No Known Allergies    Review of systems: 12 point review of systems conducted, negative except as stated in the HPI.     Past Medical History:   Past Medical History:   Diagnosis Date    A-fib     Anticoagulant long-term use     Anxiety     Aortic aneurysm     Cataract     CHF (congestive heart failure)     Chronic atrial fibrillation     COPD (chronic obstructive pulmonary disease)     Coronary artery disease     Depression     HLD (hyperlipidemia)     Hypertension     Mitral regurgitation     PAD (peripheral artery disease)     Primary open angle glaucoma (POAG)        Procedure History:   Past Surgical History:   Procedure Laterality Date    ANGIOGRAM, CORONARY, WITH LEFT HEART CATHETERIZATION N/A 03/26/2024    Procedure: Angiogram, Coronary, with Left Heart Cath;  Surgeon: Adriano Montiel MD;  Location: [facility] CATH LAB;  Service: Cardiology;  Laterality: N/A;     ATHERECTOMY OF PERIPHERAL VESSEL Left 09/12/2022    LEFT SFA ATHERECTOMY, BALLOON ANGIOPLASTY    CATARACT EXTRACTION W/  INTRAOCULAR LENS IMPLANT Left 03/09/2023    Procedure: EXTRACTION, CATARACT, WITH IOL INSERTION;  Surgeon: Georgi Borja MD;  Location: [facility];  Service: Ophthalmology;  Laterality: Left;  19.5  mac    EGD, WITH CLOSED BIOPSY  03/22/2024    Procedure: EGD, WITH CLOSED BIOPSY;  Surgeon: Ronald Calderon MD;  Location: [facility] ENDOSCOPY;  Service: Gastroenterology;;    ESOPHAGOGASTRODUODENOSCOPY N/A 03/22/2024    Procedure: EGD;  Surgeon: Ronald Calderon MD;  Location: [facility] ENDOSCOPY;  Service: Gastroenterology;  Laterality: N/A;    Heart Stent N/A     > 10yrs. AGO    INCISION AND DRAINAGE N/A 03/18/2024    Procedure: Incision and Drainage;  Surgeon: Fahad Rivas MD;  Location: [facility];  Service: Urology;  Laterality: N/A;  I&D SCROTAL ABSCESS    INSERTION OF STENT INTO PERIPHERAL VESSEL Right 10/17/2022    RIGHT SFA ATHERECTOMY, BALLOON ANGIOPLASTY, STENT; RIGHT ANTERIOR TIBIAL ARTERY ATHERECTOMY, BALLOON ANGIOPLASTY    ORCHIECTOMY Left 03/18/2024    Procedure: ORCHIECTOMY;  Surgeon: Fahad Rivas MD;  Location: [facility];  Service: Urology;  Laterality: Left;       Family History: family history includes Cancer in his mother; Heart failure in his father; Hypertension in his father and mother; No Known Problems in his sister; Stroke in his father.    Social History:  reports that he has been smoking cigarettes. He started smoking about 50 years ago. He has a 41 pack-year smoking history. He has been exposed to tobacco smoke. He has never used smokeless tobacco. He reports current alcohol use of about 3.0 standard drinks of alcohol per week. He reports that he does not currently use drugs after having used the following drugs: Marijuana. Frequency: 1.00 time per week.    Home Medications:   Prescriptions Prior to Admission[1]    Inpatient Medications:   Current  Medications[2]     Laboratory Data:   Recent Results (from the past 24 hours)   Comprehensive Metabolic Panel    Collection Time: 05/28/25  4:35 AM   Result Value Ref Range    Sodium 131 (L) 136 - 145 mmol/L    Potassium 5.1 3.5 - 5.1 mmol/L    Chloride 96 (L) 98 - 107 mmol/L    CO2 21 (L) 23 - 31 mmol/L    Glucose 132 (H) 82 - 115 mg/dL    Blood Urea Nitrogen 60.0 (H) 8.4 - 25.7 mg/dL    Creatinine 1.86 (H) 0.72 - 1.25 mg/dL    Calcium 9.4 8.8 - 10.0 mg/dL    Protein Total 7.2 5.8 - 7.6 gm/dL    Albumin 3.4 3.4 - 4.8 g/dL    Globulin 3.8 (H) 2.4 - 3.5 gm/dL    Albumin/Globulin Ratio 0.9 (L) 1.1 - 2.0 ratio    Bilirubin Total 5.8 (H) <=1.5 mg/dL     (H) 40 - 150 unit/L    ALT 61 (H) 0 - 55 unit/L    AST 66 (H) 11 - 45 unit/L    eGFR 39 mL/min/1.73/m2    Anion Gap 14.0 mEq/L    BUN/Creatinine Ratio 32    Magnesium    Collection Time: 05/28/25  4:35 AM   Result Value Ref Range    Magnesium Level 2.50 1.60 - 2.60 mg/dL   Phosphorus    Collection Time: 05/28/25  4:35 AM   Result Value Ref Range    Phosphorus Level 3.8 2.3 - 4.7 mg/dL   CBC with Differential    Collection Time: 05/28/25  4:35 AM   Result Value Ref Range    WBC 5.03 4.50 - 11.50 x10(3)/mcL    RBC 3.16 (L) 4.70 - 6.10 x10(6)/mcL    Hgb 8.2 (L) 14.0 - 18.0 g/dL    Hct 24.8 (L) 42.0 - 52.0 %    MCV 78.5 (L) 80.0 - 94.0 fL    MCH 25.9 (L) 27.0 - 31.0 pg    MCHC 33.1 33.0 - 36.0 g/dL    RDW 26.2 (H) 11.5 - 17.0 %    Platelet 77 (L) 130 - 400 x10(3)/mcL    MPV      IPF 4.4 0.9 - 11.2 %    Neut % 74.0 %    Lymph % 10.1 %    Mono % 14.9 %    Eos % 0.0 %    Basophil % 0.2 %    Imm Grans % 0.8 %    Neut # 3.72 2.1 - 9.2 x10(3)/mcL    Lymph # 0.51 (L) 0.6 - 4.6 x10(3)/mcL    Mono # 0.75 0.1 - 1.3 x10(3)/mcL    Eos # 0.00 0 - 0.9 x10(3)/mcL    Baso # 0.01 <=0.2 x10(3)/mcL    Imm Gran # 0.04 0.00 - 0.04 x10(3)/mcL    NRBC% 0.6 %   Bilirubin, Total and Direct    Collection Time: 05/28/25  4:35 AM   Result Value Ref Range    Bilirubin Total 5.8 (H) <=1.5 mg/dL     Bilirubin Direct 3.7 (H) 0.0 - <0.5 mg/dL    Bilirubin Indirect 2.10 (H) 0.00 - 0.80 mg/dL       Imaging:  X-Ray Chest 1 View   Final Result          Physical Exam:   /74   Pulse 79   Temp 97.4 °F (36.3 °C) (Oral)   Resp 18   Ht 6' (1.829 m)   Wt 82.6 kg (182 lb 1.6 oz)   SpO2 (!) 89%   BMI 24.70 kg/m²  Body mass index is 24.7 kg/m².    GEN: Chronically ill appearing, lethargic, oriented  HEENT: Conjunctival icterus, pupils equal, MMM, right-sided supraclavicular soft tissue swelling without erythema or crepitus  CV: irregular rhythm, grade 3/6 systolic murmur, JVD  PULM: right sided wheezing, unlabored  ABD: Soft, NT, distended, BS+, small reducible umbilical hernia  EXT: No cyanosis, 2+ pitting edema  SKIN: Warm and dry  PSYCH: Awake, alert, and appropriately conversant    Impression:  Acute kidney injury, prerenal azotemia secondary to cardiorenal  Mild anion gap metabolic acidosis  Hyponatremia, improving  Microcytic anemia  Thrombocytopenia  Atrial fibrillation  Adrenal insufficiency  HFrEF with severe mitral regurgitation    Plan:   - UA and renal US ordered to r/o obstructive uropathy  - Will give one dose of Bumex 2 mg IV and metolazone 10 mg PO. Monitor response to diuretics with urine output.  - Will also check iron panel and ferritin    Thank you very much for your consultation.    Sharad Amin MD  [facility] PGY-III         [1]   Medications Prior to Admission   Medication Sig Dispense Refill Last Dose/Taking    acetaminophen 325 mg Cap Take 650 mg by mouth 2 (two) times daily as needed.       albuterol (PROVENTIL/VENTOLIN HFA) 90 mcg/actuation inhaler Inhale 2 puffs into the lungs every 6 (six) hours as needed for Shortness of Breath or Wheezing. Rescue 8 g 0     albuterol-ipratropium (DUO-NEB) 2.5 mg-0.5 mg/3 mL nebulizer solution Take 3 mLs by nebulization every 6 (six) hours as needed for Shortness of Breath (For use when shortness of breath persists following inhaler usage).  Rescue 360 mL 0     atorvastatin (LIPITOR) 80 MG tablet Take 1 tablet (80 mg total) by mouth once daily. 90 tablet 0     BREZTRI AEROSPHERE 160-9-4.8 mcg/actuation HFAA Inhale 2 puffs into the lungs 2 (two) times daily.       bumetanide (BUMEX) 1 MG tablet Take 1 tablet (1 mg total) by mouth daily as needed (Take every other day for heart failure. Take every day if needed for increasing fluid status.). 30 tablet 2     clopidogreL (PLAVIX) 75 mg tablet Take 1 tablet (75 mg total) by mouth once daily. 30 tablet 1     diphenhydrAMINE (BENADRYL) 25 mg capsule Take 1 capsule (25 mg total) by mouth every 6 (six) hours as needed for Itching. 30 capsule 0     folic acid (FOLVITE) 1 MG tablet Take 1 tablet (1 mg total) by mouth once daily. 30 tablet 11     gabapentin (NEURONTIN) 300 MG capsule Take 300 mg by mouth 3 (three) times daily.       olopatadine (PATANOL) 0.1 % ophthalmic solution Apply to eye.       pantoprazole (PROTONIX) 40 MG tablet Take 1 tablet (40 mg total) by mouth once daily. 90 tablet 3     potassium chloride SA (K-DUR,KLOR-CON) 20 MEQ tablet Take 1 tablet (20 mEq total) by mouth 2 (two) times daily. 60 tablet 1     predniSONE (DELTASONE) 5 MG tablet Take 4 tablets (20 mg total) by mouth 2 (two) times daily for 7 days, THEN 3 tablets (15 mg total) 2 (two) times daily for 7 days, THEN 2 tablets (10 mg total) 2 (two) times daily for 7 days, THEN 1 tablet (5 mg total) 2 (two) times daily for 7 days. 140 tablet 0     QUEtiapine (SEROQUEL) 100 MG Tab Take 1 tablet (100 mg total) by mouth every evening. 30 tablet 0     rivaroxaban (XARELTO) 20 mg Tab Take 1 tablet (20 mg total) by mouth Daily. 30 tablet 0     senna-docusate (PERICOLACE) 8.6-50 mg per tablet Take 1 tablet by mouth 2 (two) times daily as needed for Constipation (Second line). 30 tablet 0     sertraline (ZOLOFT) 100 MG tablet Take 100 mg by mouth once daily.       sulfamethoxazole-trimethoprim 800-160mg (BACTRIM DS) 800-160 mg Tab Take 1 tablet by  mouth 2 (two) times daily. 14 tablet 0     [Paused] urea (CARMOL) 40 % Crea Apply topically.       [Paused] varenicline tartrate (CHANTIX) 1 mg Tab Take 0.5 mg by mouth.       vitamin D (VITAMIN D3) 1000 units Tab Take 1,000 Units by mouth once daily.      [2]   Current Facility-Administered Medications:     albuterol-ipratropium 2.5 mg-0.5 mg/3 mL nebulizer solution 3 mL, 3 mL, Nebulization, Q4H PRN, Adeline May MD, 3 mL at 05/28/25 0735    atorvastatin tablet 80 mg, 80 mg, Oral, Daily, Adeline May MD, 80 mg at 05/28/25 0924    bumetanide injection 1 mg, 1 mg, Intravenous, Daily, Owen Dewey MD    carvediloL tablet 3.125 mg, 3.125 mg, Oral, BID, Roldan Cisneros MD, 3.125 mg at 05/28/25 0928    clopidogreL tablet 75 mg, 75 mg, Oral, Daily, Adeline May MD, 75 mg at 05/28/25 0925    dextrose 50% injection 12.5 g, 12.5 g, Intravenous, PRN, Adeline May MD    dextrose 50% injection 25 g, 25 g, Intravenous, PRN, Adeline May MD    folic acid tablet 1 mg, 1 mg, Oral, Daily, Owen Dewey MD, 1 mg at 05/28/25 0925    glucagon (human recombinant) injection 1 mg, 1 mg, Intramuscular, PRN, Adeline May MD    glucose chewable tablet 16 g, 16 g, Oral, PRN, Adeline May MD    glucose chewable tablet 24 g, 24 g, Oral, PRN, Adeline May MD    insulin aspart U-100 injection 0-5 Units, 0-5 Units, Subcutaneous, QID (AC + HS) PRN, Adeline May MD    melatonin tablet 6 mg, 6 mg, Oral, Nightly PRN, Adeline May MD    naloxone 0.4 mg/mL injection 0.02 mg, 0.02 mg, Intravenous, PRN, Adeline May MD    nitrofurantoin (macrocrystal-monohydrate) 100 MG capsule 100 mg, 100 mg, Oral, Q12H, Dulce Maria Bernard MD, 100 mg at 05/28/25 0925    pantoprazole EC tablet 40 mg, 40 mg, Oral, Daily, Adeline May MD, 40 mg at 05/28/25 0924    [COMPLETED] predniSONE tablet 20 mg, 20 mg, Oral, BID, 20 mg at 05/25/25 2041 **FOLLOWED BY** predniSONE tablet 15 mg, 15 mg, Oral, BID, 15 mg at 05/28/25 0924 **FOLLOWED BY** [START ON 6/2/2025] predniSONE  tablet 10 mg, 10 mg, Oral, BID **FOLLOWED BY** [START ON 6/9/2025] predniSONE tablet 5 mg, 5 mg, Oral, BID, Adeline May MD    QUEtiapine tablet 100 mg, 100 mg, Oral, QHS, Adeline May MD, 100 mg at 05/27/25 2041    rivaroxaban tablet 20 mg, 20 mg, Oral, Daily, Adeline May MD, 20 mg at 05/27/25 1619    sertraline tablet 100 mg, 100 mg, Oral, Daily, Adeline May MD, 100 mg at 05/28/25 0924    sodium chloride 0.9% flush 10 mL, 10 mL, Intravenous, Q12H PRN, Adeline May MD    thiamine tablet 100 mg, 100 mg, Oral, Daily, Owen Dewey MD, 100 mg at 05/28/25 0924    vitamin D 1000 units tablet 1,000 Units, 1,000 Units, Oral, Daily, Adeline May MD, 1,000 Units at 05/28/25 0924    Facility-Administered Medications Ordered in Other Encounters:     0.9%  NaCl infusion, , Intravenous, Continuous, Shannon Milligan MD, Last Rate: 10 mL/hr at 03/09/23 1020, New Bag at 03/09/23 1020    LIDOcaine (PF) 10 mg/ml (1%) injection 10 mg, 1 mL, Intradermal, Once, Lanette Ulloa FNP    LIDOcaine (PF) 10 mg/ml (1%) injection 10 mg, 1 mL, Intradermal, Once, Shannon Milligan MD    ondansetron injection 4 mg, 4 mg, Intravenous, Once, Shannon Milligan MD    prochlorperazine injection Soln 5 mg, 5 mg, Intravenous, Once PRN, Shannon Milligan MD

## 2025-05-28 NOTE — PROGRESS NOTES
Cardiology Attending  05/28/2025 3:57 PM    Mr. Ran Reyes Jr. is a 69 y.o. male who presented with   Chief Complaint   Patient presents with    Fatigue    Leg Swelling     PT IN /[facility] W REPORT OF WEAKNESS/SOB AND EDEMA TO LOWER LEGS. STATES HAS NOT EATEN X 18 HRS.  CBG EN ROUTE 137. 20G IV TO LT HAND /EMS.   PT FOUND TO HAVE BED BUGS /[facility]. EKG TO BE OBTAINED.         Cardiology seeing patient for question about ICD placement.    Patient is somnolent, awakens to stimulation but does not answer questions.      Allergy  Review of patient's allergies indicates:  No Known Allergies    Current Medications  Current Medications[1]    ROS    Please see HPI    Blood pressure 117/81, pulse 82, temperature 97.6 °F (36.4 °C), temperature source Oral, resp. rate 18, height 6' (1.829 m), weight 82.6 kg (182 lb 1.6 oz), SpO2 (!) 94%.  PE    GEN  Somnolent   Arouses to stimulation, but falls asleep again.     Echocardiogram  Results for orders placed during the hospital encounter of 04/21/25    Echo    Interpretation Summary    Mitral Valve: There is severe regurgitation with a centrally directed jet.      Stress Test  No results found for this or any previous visit.     Coronary Angiogram  Results for orders placed during the hospital encounter of 03/17/24    Cardiac catheterization    Conclusion  The procedure log was documented by No documenter listed and verified by Adriano Montiel MD.    Procedure:  Selective coronary angiography  Moderate (Conscious) Sedation      Indication: VT arrest, known cardiomyopathy, NSTEMI  Consent: The patient was brought to the cardiac catheterization lab. Was instructed and explained about the risk, benefit and alternatives of the procedure included but not limited to sudden cardiac death, myocardial infarction, bleeding, vascular injury, renal failure, stroke, contrast allergy, risk of conscious sedation and need for emergent bypass surgery.  the patient was agreeable to proceed.  Signed  the consent form.  Access:  The patient was prepped using the usual sterile fashion.  Right radial artery  was accessed with micropuncture technique, ultrasound guidance.  An image of the ultrasound was put in the paper chart for purpose of documentation.  Sheath size:6F    Kirill test:  Verified with pulse oximetry deemed to be adequate for radial artery procedure.    Diagnostic catheters : TIG, JL 3.5    Coronary findings:    Dominance: right  Left main:  10-20% calcified distal left main disease  Left anterior descending artery:  50% calcified proximal stenosis  Circumflex artery:  Luminal irregularities  Right coronary artery:  Luminal irregularities    Access Closure:  At the end of the procedure the sheath was removed. A TR band applied to the right radial artery . Excellent hemostasis achieved.     Monitor  No cardiac monitor results found for the past 12 months    Last BMP BMP  Lab Results   Component Value Date     (L) 05/28/2025    K 5.1 05/28/2025    CL 96 (L) 05/28/2025    CO2 21 (L) 05/28/2025    BUN 60.0 (H) 05/28/2025    CREATININE 1.86 (H) 05/28/2025    CALCIUM 9.4 05/28/2025    EGFRNORACEVR 39 05/28/2025      Lab Results   Component Value Date    CREATININE 1.86 (H) 05/28/2025    CREATININE 1.75 (H) 05/27/2025    CREATININE 1.61 (H) 05/26/2025     Lab Results   Component Value Date    TROPONINI 0.048 (H) 05/24/2025    TROPONINI 0.059 (H) 05/24/2025    TROPONINI 0.046 (H) 05/24/2025     Last CBC     Lab Results   Component Value Date    WBC 5.03 05/28/2025    HGB 8.2 (L) 05/28/2025    HCT 24.8 (L) 05/28/2025    MCV 78.5 (L) 05/28/2025    PLT 77 (L) 05/28/2025           BNP    Lab Results   Component Value Date    BNP 2,615.6 (H) 05/23/2025    BNP 3,765.7 (H) 05/08/2025    BNP 2,823.0 (H) 04/30/2025     Last lipids    Lab Results   Component Value Date    CHOL 96 08/14/2024    CHOL 46 03/18/2024    CHOL 104 11/16/2021    HDL 32 (L) 08/14/2024    HDL <5 (L) 03/18/2024    HDL 41 11/16/2021    LDL  49.00 (L) 08/14/2024    LDL 49.00 (L) 11/16/2021    LDL 37 10/07/2020    TRIG 73 08/14/2024    TRIG 46 04/12/2024    TRIG 58 04/11/2024    TOTALCHOLEST 3 08/14/2024    TOTALCHOLEST  03/18/2024      Comment:      N/A      TOTALCHOLEST 3 11/16/2021      LFT     Lab Results   Component Value Date    ALT 61 (H) 05/28/2025    ALT 52 05/27/2025    ALT 47 05/26/2025    AST 66 (H) 05/28/2025    AST 51 (H) 05/27/2025    AST 45 05/26/2025       ASSESSMENT AND REC  HFrEF:  diurese.  Eventually GDMT as tolerated.   Pul HTN  Severe MR:  needs to be addressed to decrease risk of HF decompensation.   Adrenal insufficiency:  getting steroid   ELIZABETH:  seen by nephrology   Afib:  anticoagulated.  Rate controlled.  Nonsustained VT:  on life-vest.  Can consider amiodarone.  Pt is increased risk for placing ICD at the moment (as outlined in yesterday's note)   Lethargic:  unclear cause.  Consider further eval.      Aung Verduzco MD          [1]   Current Facility-Administered Medications:     albuterol-ipratropium 2.5 mg-0.5 mg/3 mL nebulizer solution 3 mL, 3 mL, Nebulization, Q4H PRN, Adeline May MD, 3 mL at 05/28/25 1515    atorvastatin tablet 80 mg, 80 mg, Oral, Daily, Adeline May MD, 80 mg at 05/28/25 0924    bumetanide injection 2 mg, 2 mg, Intravenous, Daily, Sharad Amin MD, 2 mg at 05/28/25 1446    carvediloL tablet 3.125 mg, 3.125 mg, Oral, BID, Roldan Cisneros MD, 3.125 mg at 05/28/25 0928    clopidogreL tablet 75 mg, 75 mg, Oral, Daily, Adeline May MD, 75 mg at 05/28/25 0925    dextrose 50% injection 12.5 g, 12.5 g, Intravenous, PRN, Adeline May MD    dextrose 50% injection 25 g, 25 g, Intravenous, PRN, Adeline May MD    folic acid tablet 1 mg, 1 mg, Oral, Daily, Owen Dewey MD, 1 mg at 05/28/25 0925    glucagon (human recombinant) injection 1 mg, 1 mg, Intramuscular, PRN, Adeline May MD    glucose chewable tablet 16 g, 16 g, Oral, PRLalo LUCIANO Shea, MD    glucose chewable tablet 24 g, 24 g, Oral, PRNLalo  MD Adeline    insulin aspart U-100 injection 0-5 Units, 0-5 Units, Subcutaneous, QID (AC + HS) PRN, Adeline May MD    melatonin tablet 6 mg, 6 mg, Oral, Nightly PRN, Adeline May MD    naloxone 0.4 mg/mL injection 0.02 mg, 0.02 mg, Intravenous, PRN, Adeline May MD    nitrofurantoin (macrocrystal-monohydrate) 100 MG capsule 100 mg, 100 mg, Oral, Q12H, Dulce Maria Bernard MD, 100 mg at 05/28/25 0925    pantoprazole EC tablet 40 mg, 40 mg, Oral, Daily, Adeline May MD, 40 mg at 05/28/25 0924    [COMPLETED] predniSONE tablet 20 mg, 20 mg, Oral, BID, 20 mg at 05/25/25 2041 **FOLLOWED BY** predniSONE tablet 15 mg, 15 mg, Oral, BID, 15 mg at 05/28/25 0924 **FOLLOWED BY** [START ON 6/2/2025] predniSONE tablet 10 mg, 10 mg, Oral, BID **FOLLOWED BY** [START ON 6/9/2025] predniSONE tablet 5 mg, 5 mg, Oral, BID, Adeline May MD    QUEtiapine tablet 75 mg, 75 mg, Oral, QHS, Owen Dewey MD    rivaroxaban tablet 20 mg, 20 mg, Oral, Daily, Adeline May MD, 20 mg at 05/27/25 1619    [START ON 5/29/2025] sertraline tablet 75 mg, 75 mg, Oral, Daily, Owen Dewey MD    sodium chloride 0.9% flush 10 mL, 10 mL, Intravenous, Q12H PRN, Adeline May MD    thiamine tablet 100 mg, 100 mg, Oral, Daily, Owen Dewey MD, 100 mg at 05/28/25 0924    vitamin D 1000 units tablet 1,000 Units, 1,000 Units, Oral, Daily, Adeline May MD, 1,000 Units at 05/28/25 0924    Facility-Administered Medications Ordered in Other Encounters:     0.9%  NaCl infusion, , Intravenous, Continuous, Shannon Milligan MD, Last Rate: 10 mL/hr at 03/09/23 1020, New Bag at 03/09/23 1020    LIDOcaine (PF) 10 mg/ml (1%) injection 10 mg, 1 mL, Intradermal, Once, Lanette Ulloa FNP    LIDOcaine (PF) 10 mg/ml (1%) injection 10 mg, 1 mL, Intradermal, Once, Shannon Milligan MD    ondansetron injection 4 mg, 4 mg, Intravenous, Once, Shannon Milligan MD    prochlorperazine injection Soln 5 mg, 5 mg, Intravenous, Once PRN, Shannon Milligan MD

## 2025-05-29 LAB
ALBUMIN SERPL-MCNC: 3.3 G/DL (ref 3.4–4.8)
ALBUMIN/GLOB SERPL: 0.9 RATIO (ref 1.1–2)
ALP SERPL-CCNC: 423 UNIT/L (ref 40–150)
ALT SERPL-CCNC: 71 UNIT/L (ref 0–55)
ANION GAP SERPL CALC-SCNC: 16 MEQ/L
AST SERPL-CCNC: 81 UNIT/L (ref 11–45)
BACTERIA BLD CULT: NORMAL
BACTERIA BLD CULT: NORMAL
BASOPHILS # BLD AUTO: 0.01 X10(3)/MCL
BASOPHILS NFR BLD AUTO: 0.2 %
BILIRUB SERPL-MCNC: 5.8 MG/DL
BUN SERPL-MCNC: 63.8 MG/DL (ref 8.4–25.7)
CALCIUM SERPL-MCNC: 9.4 MG/DL (ref 8.8–10)
CHLORIDE SERPL-SCNC: 96 MMOL/L (ref 98–107)
CO2 SERPL-SCNC: 22 MMOL/L (ref 23–31)
CREAT SERPL-MCNC: 1.93 MG/DL (ref 0.72–1.25)
CREAT/UREA NIT SERPL: 33
EOSINOPHIL # BLD AUTO: 0 X10(3)/MCL (ref 0–0.9)
EOSINOPHIL NFR BLD AUTO: 0 %
ERYTHROCYTE [DISTWIDTH] IN BLOOD BY AUTOMATED COUNT: 26.5 % (ref 11.5–17)
GFR SERPLBLD CREATININE-BSD FMLA CKD-EPI: 37 ML/MIN/1.73/M2
GLOBULIN SER-MCNC: 3.8 GM/DL (ref 2.4–3.5)
GLUCOSE SERPL-MCNC: 145 MG/DL (ref 82–115)
HCT VFR BLD AUTO: 23.7 % (ref 42–52)
HGB BLD-MCNC: 7.8 G/DL (ref 14–18)
IMM GRANULOCYTES # BLD AUTO: 0.04 X10(3)/MCL (ref 0–0.04)
IMM GRANULOCYTES NFR BLD AUTO: 0.8 %
LYMPHOCYTES # BLD AUTO: 0.49 X10(3)/MCL (ref 0.6–4.6)
LYMPHOCYTES NFR BLD AUTO: 9.7 %
MAGNESIUM SERPL-MCNC: 2.5 MG/DL (ref 1.6–2.6)
MCH RBC QN AUTO: 25.3 PG (ref 27–31)
MCHC RBC AUTO-ENTMCNC: 32.9 G/DL (ref 33–36)
MCV RBC AUTO: 76.9 FL (ref 80–94)
MONOCYTES # BLD AUTO: 0.43 X10(3)/MCL (ref 0.1–1.3)
MONOCYTES NFR BLD AUTO: 8.5 %
NEUTROPHILS # BLD AUTO: 4.06 X10(3)/MCL (ref 2.1–9.2)
NEUTROPHILS NFR BLD AUTO: 80.8 %
NRBC BLD AUTO-RTO: 1.4 %
PHOSPHATE SERPL-MCNC: 3.6 MG/DL (ref 2.3–4.7)
PLATELET # BLD AUTO: 73 X10(3)/MCL (ref 130–400)
PLATELETS.RETICULATED NFR BLD AUTO: 6.4 % (ref 0.9–11.2)
PMV BLD AUTO: ABNORMAL FL
POTASSIUM SERPL-SCNC: 3.8 MMOL/L (ref 3.5–5.1)
PROT SERPL-MCNC: 7.1 GM/DL (ref 5.8–7.6)
RBC # BLD AUTO: 3.08 X10(6)/MCL (ref 4.7–6.1)
SODIUM SERPL-SCNC: 134 MMOL/L (ref 136–145)
WBC # BLD AUTO: 5.03 X10(3)/MCL (ref 4.5–11.5)

## 2025-05-29 PROCEDURE — 11000001 HC ACUTE MED/SURG PRIVATE ROOM

## 2025-05-29 PROCEDURE — 85025 COMPLETE CBC W/AUTO DIFF WBC: CPT

## 2025-05-29 PROCEDURE — 25000003 PHARM REV CODE 250

## 2025-05-29 PROCEDURE — 94761 N-INVAS EAR/PLS OXIMETRY MLT: CPT

## 2025-05-29 PROCEDURE — 36415 COLL VENOUS BLD VENIPUNCTURE: CPT

## 2025-05-29 PROCEDURE — 84100 ASSAY OF PHOSPHORUS: CPT

## 2025-05-29 PROCEDURE — 99900035 HC TECH TIME PER 15 MIN (STAT)

## 2025-05-29 PROCEDURE — 80053 COMPREHEN METABOLIC PANEL: CPT

## 2025-05-29 PROCEDURE — 97530 THERAPEUTIC ACTIVITIES: CPT

## 2025-05-29 PROCEDURE — 63600175 PHARM REV CODE 636 W HCPCS: Mod: JZ,TB | Performed by: INTERNAL MEDICINE

## 2025-05-29 PROCEDURE — 25000003 PHARM REV CODE 250: Performed by: INTERNAL MEDICINE

## 2025-05-29 PROCEDURE — 83735 ASSAY OF MAGNESIUM: CPT

## 2025-05-29 PROCEDURE — 63600175 PHARM REV CODE 636 W HCPCS

## 2025-05-29 PROCEDURE — 63600175 PHARM REV CODE 636 W HCPCS: Mod: JZ,TB

## 2025-05-29 PROCEDURE — 27000207 HC ISOLATION

## 2025-05-29 RX ORDER — THIAMINE HCL 100 MG
500 TABLET ORAL DAILY
Status: DISCONTINUED | OUTPATIENT
Start: 2025-05-29 | End: 2025-06-05 | Stop reason: HOSPADM

## 2025-05-29 RX ORDER — BUMETANIDE 0.25 MG/ML
2 INJECTION, SOLUTION INTRAMUSCULAR; INTRAVENOUS 2 TIMES DAILY
Status: DISCONTINUED | OUTPATIENT
Start: 2025-05-29 | End: 2025-05-31

## 2025-05-29 RX ORDER — METOLAZONE 5 MG/1
10 TABLET ORAL DAILY
Status: DISCONTINUED | OUTPATIENT
Start: 2025-05-29 | End: 2025-05-30

## 2025-05-29 RX ADMIN — PREDNISONE 15 MG: 5 TABLET ORAL at 09:05

## 2025-05-29 RX ADMIN — CARVEDILOL 3.12 MG: 3.12 TABLET, FILM COATED ORAL at 09:05

## 2025-05-29 RX ADMIN — QUETIAPINE FUMARATE 75 MG: 25 TABLET ORAL at 09:05

## 2025-05-29 RX ADMIN — ATORVASTATIN CALCIUM 80 MG: 40 TABLET, FILM COATED ORAL at 09:05

## 2025-05-29 RX ADMIN — RIVAROXABAN 15 MG: 10 TABLET, FILM COATED ORAL at 05:05

## 2025-05-29 RX ADMIN — NITROFURANTOIN (MONOHYDRATE/MACROCRYSTALS) 100 MG: 25; 75 CAPSULE ORAL at 09:05

## 2025-05-29 RX ADMIN — METOLAZONE 10 MG: 5 TABLET ORAL at 09:05

## 2025-05-29 RX ADMIN — BUMETANIDE 2 MG: 0.25 INJECTION INTRAMUSCULAR; INTRAVENOUS at 08:05

## 2025-05-29 RX ADMIN — Medication 1000 UNITS: at 09:05

## 2025-05-29 RX ADMIN — SERTRALINE HYDROCHLORIDE 75 MG: 50 TABLET ORAL at 09:05

## 2025-05-29 RX ADMIN — CLOPIDOGREL BISULFATE 75 MG: 75 TABLET, FILM COATED ORAL at 09:05

## 2025-05-29 RX ADMIN — THIAMINE HCL TAB 100 MG 500 MG: 100 TAB at 09:05

## 2025-05-29 RX ADMIN — PANTOPRAZOLE SODIUM 40 MG: 40 TABLET, DELAYED RELEASE ORAL at 09:05

## 2025-05-29 RX ADMIN — THERA TABS 1 TABLET: TAB at 05:05

## 2025-05-29 RX ADMIN — FOLIC ACID 1 MG: 1 TABLET ORAL at 09:05

## 2025-05-29 RX ADMIN — BUMETANIDE 2 MG: 0.25 INJECTION INTRAMUSCULAR; INTRAVENOUS at 09:05

## 2025-05-29 RX ADMIN — SODIUM CHLORIDE 125 MG: 9 INJECTION, SOLUTION INTRAVENOUS at 12:05

## 2025-05-29 NOTE — PT/OT/SLP PROGRESS
Physical Therapy    Missed Treatment Session - cancel note - 05/29/2025    Patient Name:  Ran Reyes Jr.   MRN:  [id number]      Patient not seen at this time secondary to Other (Comment) (Pt sleeping.  He asked that I come back at a later time.).    Will follow-up as patient is appropriate/available/agreeable to participate and as therapists' schedule allows.

## 2025-05-29 NOTE — PROGRESS NOTES
[facility] Progress Note     Resident Team: [facility] Medicine List 2  Attending: Merle Sun MD  Resident: Maco Bernard    Subjective:      Brief HPI:  Ran Reyes Jr. is a 69 y.o. 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 Of note, discharged from this facility on 5/20/25 after admission for cardiogenic/septic shock. Adrenal insufficiency noted during that admission; discharged on prednisone taper. Documented inability to tolerate GDMT. Discharged with life vest. According to documentation, was discharged with HH. Today, patient states he lives alone and family members have not been coming to help regularly. States he has not eaten since yesterday afternoon as no family member came to bring him food. He did not take home meds today. He is requesting nursing home placement as he is no longer able to care for himself at home. Denies chest pain, shortness of breath or palpitations. No fever, chills, nausea, vomiting, diarrhea, or urinary symptoms.     Interval History:   Patient awake, alert and oriented x 2. He responded questions appropriately. Per chart,  mL last 24 hr s/p Bumex 2g IV BID. BLE edemea 2+. Nurse states patent has decreased PO intake. Diet modified with bite sized. Boost as dietary nutrition supplements TID. Nurse also reports sister mentioned patient is a heavy drinker. PETH still pending. Labs showed iron deficiency anemia. Increased Thiamine to 500 mg PO and Ferrlecit IV x 4 doses. He denies headache, chest pain, shortness of breath, and abdominal pain. Working with PT.     Review of Systems:  As stated above.      Objective:     Last 24 Hour Vital Signs:  BP  Min: 97/70  Max: 117/81  Temp  Av.5 °F (36.4 °C)  Min: 97.3 °F (36.3 °C)  Max: 97.7 °F (36.5 °C)  Pulse  Av.7  Min: 61  Max: 82  Resp  Av.9  Min: 16  Max: 20  SpO2  Av %  Min: 94 %  Max: 100 %  Weight  Av.4 kg (181 lb 10.5 oz)  Min: 82.4 kg (181 lb 10.5 oz)  Max: 82.4 kg (181 lb 10.5 oz)  I/O last 3 completed shifts:  In: 845 [P.O.:845]  Out: 1301 [Urine:1300; Stool:1]    Physical Examination:  Physical Exam  Vitals reviewed.   Constitutional:       General: He is not in acute distress.     Appearance: Normal appearance. He is not ill-appearing, toxic-appearing or diaphoretic.   Neck:      Comments: JVD present  Cardiovascular:      Rate and Rhythm: Normal rate.      Heart sounds: Murmur heard.   Pulmonary:      Effort: Pulmonary effort is normal. No respiratory distress.      Breath sounds: Normal breath sounds. No stridor. No wheezing, rhonchi or rales.   Abdominal:      General: Bowel sounds are normal. There is no distension.      Palpations: Abdomen is soft.      Tenderness: There is no abdominal tenderness. There is no guarding or rebound.   Musculoskeletal:      Right lower leg: Edema present.      Left lower leg: Edema present.      Comments: 2 + pitting edema in bilateral lower extremities   Skin:     General: Skin is warm.   Neurological:      General: No focal deficit present.      Mental Status: He is alert and oriented to person, place, and time.         Laboratory:  Most Recent Data:  CBC:   Lab Results   Component Value Date    WBC 5.03 2025    HGB 7.8 (L) 2025    HCT 23.7 (L) 2025    PLT 73 (L) 2025    MCV 76.9 (L) 2025    RDW 26.5 (H) 2025     WBC Differential:   Recent Labs   Lab 25  0337 25  0346 25  0351 25  0435 25  0422   WBC 5.36 5.99 5.29 5.03 5.03   HGB 8.2* 8.1* 8.1* 8.2* 7.8*   HCT 24.9* 25.1* 24.1* 24.8* 23.7*   * 100* 89* 77* 73*   MCV 76.1*  78.2* 76.0* 78.5* 76.9*     BMP:   Lab Results   Component Value Date     (L) 05/29/2025    K 3.8 05/29/2025    CL 96 (L) 05/29/2025    CO2 22 (L) 05/29/2025    BUN 63.8 (H) 05/29/2025    CREATININE 1.93 (H) 05/29/2025     (H) 05/29/2025    CALCIUM 9.4 05/29/2025    MG 2.50 05/29/2025    PHOS 3.6 05/29/2025     LFTs:   Lab Results   Component Value Date    PROT 7.1 05/29/2025    ALBUMIN 3.3 (L) 05/29/2025    BILITOT 5.8 (H) 05/29/2025    AST 81 (H) 05/29/2025    ALKPHOS 423 (H) 05/29/2025    ALT 71 (H) 05/29/2025     Coags:   Lab Results   Component Value Date    INR 3.4 (H) 05/28/2025    PROTIME 35.2 (H) 05/28/2025     FLP:   Lab Results   Component Value Date    CHOL 96 08/14/2024    HDL 32 (L) 08/14/2024    TRIG 73 08/14/2024     DM:   Lab Results   Component Value Date    HGBA1C 5.0 03/18/2024    HGBA1C 4.7 11/15/2021    HGBA1C 5.7 10/07/2020    CREATININE 1.93 (H) 05/29/2025     Thyroid:   Lab Results   Component Value Date    TSH 1.097 03/18/2024      Anemia:   Lab Results   Component Value Date    IRON 34 (L) 05/28/2025    TIBC 317 05/28/2025    FERRITIN 190.16 05/28/2025       Lab Results   Component Value Date    IERDPCDE38 >2,000 (H) 04/22/2025       Lab Results   Component Value Date    FOLATE 18.5 04/22/2025        Cardiac:   Lab Results   Component Value Date    TROPONINI 0.048 (H) 05/24/2025    BNP 2,615.6 (H) 05/23/2025         Microbiology Data:  Microbiology Results (last 7 days)       Procedure Component Value Units Date/Time    Blood Culture #1 **CANNOT BE ORDERED STAT** [[id number]]  (Normal) Collected: 05/23/25 2127    Order Status: Completed Specimen: Blood from Hand, Right Updated: 05/29/25 1001     Blood Culture No Growth at 5 days    Blood Culture #2 **CANNOT BE ORDERED STAT** [[id number]]  (Normal) Collected: 05/23/25 2136    Order Status: Completed Specimen: Blood Updated: 05/29/25 1001     Blood Culture No Growth at 5 days    Urine culture [[id number]]  (Abnormal)   (Susceptibility) Collected: 05/23/25 1955    Order Status: Completed Specimen: Urine Updated: 05/26/25 0823     Urine Culture >/= 100,000 colonies/ml Escherichia coli ESBL             Radiology:  Imaging Results              X-Ray Chest 1 View (Final result)  Result time 05/23/25 19:54:58      Final result by Ramana Mcleod MD (05/23/25 19:54:58)                   Narrative:    EXAMINATION  XR CHEST 1 VIEW    CLINICAL HISTORY  weakness;    TECHNIQUE  A total of 1 frontal image(s) submitted of the chest.    COMPARISON  4 May 2025    FINDINGS  Lines/tubes/devices: ECG leads overlie the imaged region.  Right PICC no longer visualized.    There is similar enlargement the cardiac silhouette, central vascular congestion, and widespread lung interstitial changes.  The trachea is midline. No new or worsening consolidation is developed in the interval.  There is no large pleural effusion or convincing pneumothorax.    Regional osseous structures and extrathoracic soft tissues are similar.    IMPRESSION  No acute process or other adverse interval change.  Chronic secondary findings discussed above.      Electronically signed by: Ramana Mcleod  Date:    05/23/2025  Time:    19:54                                    Current Medications:     Infusions:       Scheduled:   atorvastatin  80 mg Oral Daily    bumetanide  2 mg Intravenous BID    carvediloL  3.125 mg Oral BID    clopidogreL  75 mg Oral Daily    ferric gluconate (Ferrlecit) IVPB  125 mg Intravenous Daily    folic acid  1 mg Oral Daily    metOLazone  10 mg Oral Daily    nitrofurantoin (macrocrystal-monohydrate)  100 mg Oral Q12H    pantoprazole  40 mg Oral Daily    predniSONE  15 mg Oral BID    Followed by    [START ON 6/2/2025] predniSONE  10 mg Oral BID    Followed by    [START ON 6/9/2025] predniSONE  5 mg Oral BID    QUEtiapine  75 mg Oral QHS    rivaroxaban  15 mg Oral Daily with dinner    sertraline  75 mg Oral Daily    thiamine  500 mg Oral Daily    vitamin D   1,000 Units Oral Daily        PRN:    Current Facility-Administered Medications:     albuterol-ipratropium, 3 mL, Nebulization, Q4H PRN    dextrose 50%, 12.5 g, Intravenous, PRN    dextrose 50%, 25 g, Intravenous, PRN    glucagon (human recombinant), 1 mg, Intramuscular, PRN    glucose, 16 g, Oral, PRN    glucose, 24 g, Oral, PRN    insulin aspart U-100, 0-5 Units, Subcutaneous, QID (AC + HS) PRN    melatonin, 6 mg, Oral, Nightly PRN    naloxone, 0.02 mg, Intravenous, PRN    sodium chloride 0.9%, 10 mL, Intravenous, Q12H PRN      Assessment & Plan:     Encephalopathy, likely 2/2 hx of EtOH intake  - Per nurse, sister states he was a heavy alcohol drinker  - PETH pending  - Thiamine 500 mg PO daily and folate 1 mg PO daily    Microcytic anemia, likely 2/2 iron deficiency  - H/H (5/29/2025) 7.8/23.7  - MCV 76.9  - Iron profile(5/28/2025): Fe 34, UIBC 283, and Iron sat 11  - Ferrlecit 125 mg IV daily x 4 doses    Thrombocytopenia, likely 2/2 liver cirrhosis  - Platelet (5/29/2025) 73  - US abd (5/28/2025) showed hepatomegaly  - CT abd (5/3/2025) showed hepatic findings suggesting changes of cirrhosis      UTI   Difficulty urinating  - Afebrile, no leukocytosis, and asymptomatic  - Lactate 2.8; f/u trended down  - Urine cx grew ecoli ESBL. Upon chart review, patient received Rocephin x 2 doses. However, abx was discontinued as patient was asymptomatic.  - Patient c/o difficulty urinating this AM. Will do bladder scan. Macrobid initiated based on urine cx sensitivities. Consider escalating to Merem if symptoms not controlled.       ELIZABETH  Hyponatremia  Anion gap metabolic acidosis  - Suspect initial anion gap in setting of elevated lactic acid  - Hyponatremia potentially multifactorial with chronicity noted on prior admissions. Appears asymptomatic at this time.   - Potential contributions: hypervolemic hyponatremia d/t CHF vs hypovolemia-related d/t poor oral intake/diuretic use vs adrenal insufficiency  - Prerenal ELIZABETH  possibly 2/2 poor intake  - Resume oral intake. S/p 1L LR in ED. Replete volume judiciously in setting of HFrEF.  - Continue electrolyte goals: K> 4 Mag > 3 Phos > 2        Troponinemia  Chronic persistent A-fib   Nonobstructive CAD (per 3/2024 [facility]), NICM  HFrEF   HTN  pHTN  PVD   HLD  - Troponin 0.057. EKG with afib (rate 98) with PVCs; T wave inversions in 1 and aVL (noted on priors); ST abnormalities in V1 and V3 (noted on priors). No chest pain or dynamic EKG changes. F/U trops continued with downward trend  - BNP 2615 (down from 3765 on 5/13)  - CXR demonstrating cardiomegaly, central vascular congestion and chronic interstitial changes; on my read vascular congestion improved from recent hospitalization.   -TTE 4/2025: EF 22% with PASP 59 mm Hg and grade 3 diastolic dysfunction   - Continue home Bumex PO 1mg daily. Consider changing to IV and daily dosing if begins to retain more fluid.   - Daily weights, strict I/O  - Documented inability to tolerate GDMT at last admission.   - Continue Coreg 3.125 mg bid  - Continue Bumex 2 mg IV BID  - Continue anticoagulation for CVA risk reduction in setting of atrial fibrillation.    - Continue SAPT and high-intensity statin therapy  - Cardiac monitoring; defibrillator leads attached due to bedbug infestation of LifeVest wearable defibrillator       Adrenal insufficiency  - Continue prednisone taper (initiated during recent hospitalization)       COPD  - Duonebs q4h PRN       Anxiety/insomnia   - Continue home Quetiapine 100 mg qhs and sertraline 100mg qd      CODE STATUS: Full Code  Access: Peripheral  Antibiotics: Macrobid  Diet: Heart healtyhy  DVT Prophylaxis: Xarelto  GI Prophylaxis: None  Fluids: none    Disposition: day 5 of admission for deconditioning. Pending Nursing home placement. Patient also with severe CHF - on home Bumex 1mg Q every other day.       Roldan Cisneros MD  [facility], [facility] Medicine, -1  05/29/2025

## 2025-05-29 NOTE — PLAN OF CARE
Problem: Adult Inpatient Plan of Care  Goal: Plan of Care Review  Outcome: Progressing  Goal: Patient-Specific Goal (Individualized)  Outcome: Progressing  Goal: Absence of Hospital-Acquired Illness or Injury  Outcome: Progressing  Goal: Optimal Comfort and Wellbeing  Outcome: Progressing  Goal: Readiness for Transition of Care  Outcome: Progressing     Problem: Heart Failure  Goal: Optimal Coping  Outcome: Progressing  Goal: Optimal Cardiac Output  Outcome: Progressing  Goal: Stable Heart Rate and Rhythm  Outcome: Progressing  Goal: Optimal Functional Ability  Outcome: Progressing  Goal: Fluid and Electrolyte Balance  Outcome: Progressing  Goal: Improved Oral Intake  Outcome: Progressing  Goal: Effective Oxygenation and Ventilation  Outcome: Progressing  Goal: Effective Breathing Pattern During Sleep  Outcome: Progressing     Problem: Fall Injury Risk  Goal: Absence of Fall and Fall-Related Injury  Outcome: Progressing     Problem: Acute Kidney Injury/Impairment  Goal: Fluid and Electrolyte Balance  Outcome: Progressing  Goal: Improved Oral Intake  Outcome: Progressing  Goal: Effective Renal Function  Outcome: Progressing     Problem: Infection  Goal: Absence of Infection Signs and Symptoms  Outcome: Progressing     Problem: Skin Injury Risk Increased  Goal: Skin Health and Integrity  Outcome: Progressing     Problem: Comorbidity Management  Goal: Maintenance of COPD Symptom Control  Outcome: Progressing  Goal: Maintenance of Heart Failure Symptom Control  Outcome: Progressing  Goal: Blood Pressure in Desired Range  Outcome: Progressing     Problem: Dysrhythmia  Goal: Normalized Cardiac Rhythm  Outcome: Progressing

## 2025-05-29 NOTE — PHYSICIAN QUERY
Please further specify the acuity of the patients HFrEF.  Acute on chronic systolic heart failure (HFrEF or HFmrEF)

## 2025-05-29 NOTE — PT/OT/SLP PROGRESS
Physical Therapy Treatment    Patient Name:  Ran Reyes Jr.   MRN:  [id number]    Recommendations     Therapy Intensity Recommendations at Discharge: Moderate Intensity Therapy  Discharge Equipment Recommendations:  (TBD based on progress)   Barriers to discharge: fall risk, level of skilled assistance required, and decreased endurance    Assessment     Ran Reyes Jr. is a 69 y.o. male admitted with a medical diagnosis of   1. Chronic congestive heart failure, unspecified heart failure type    2. Congestive heart failure, unspecified HF chronicity, unspecified heart failure type    3. Weakness    4. Longstanding persistent atrial fibrillation    5. ELIZABETH (acute kidney injury)    6. Acute cystitis without hematuria    7. Unable to care for self    8. Chest pain    9. Elevated troponin    10. Arteriosclerosis of coronary artery    11. Hepatic cirrhosis, unspecified hepatic cirrhosis type, unspecified whether ascites present       Problem List[1]   He presents with the following impairments/functional limitations:  weakness, impaired endurance, gait instability, impaired balance, decreased coordination, decreased lower extremity function, decreased safety awareness, impaired cardiopulmonary response to activity.    Rehab Prognosis: TBD pending progress.    Patient would benefit from continued skilled acute PT services to: address above listed impairments/functional limitations; receive patient/caregiver education; reduce fall risk; and maximize independency/safety with functional mobility.    Recent Surgery: * No surgery found *      Plan     During this hospitalization, patient to be seen 5 x/week to address the identified impairments/functional limitations via gait training, therapeutic activities, therapeutic exercises, neuromuscular re-education and progress toward the established goals.    Plan of Care Expires:   (Discharge)    Subjective     Communicated with patient's nurse Alyssa prior to  session.    Patient agreeable to participate in treatment session.    Chief Complaint: Fatigue with standing   Patient/Family Comments/goals: None stated   Pain/Comfort:  Pain Rating 1: 0/10  Pain Rating Post-Intervention 1: 0/10    Objective     Patient found with HOB elevated with PureWick, peripheral IV, telemetry  upon PT entry to room.    General Precautions: Standard, fall   Orthopedic Precautions:N/A   Braces:  N/A  Respiratory Status: room air    Functional Mobility:    Bed Mobility:  Rolling Left: moderate assistance  Rolling Right: moderate assistance  Supine to Sit: moderate assistance  Sit to Supine: moderate assistance  Repositioning to center-of-bed: moderate assistance    Transfers:  Sit to Stand: moderate assistance with rolling walker  Stand to Sit: moderate assistance with rolling walker    Gait:  Patient ambulated 4ft with rolling walker and moderate assistance.  Patient demonstrates :       unsteady gait and buckling BLE     Other Mobility:  Therapeutic Activities performed:        -sitting balance activities:              weight shifting:                   -forward                 -backward                 -laterally (left)            scooting:                   -forward                 -backward                 -laterally (left)            repositioning at edge of bed    Patient left with HOB elevated with all lines intact, call button in reach, and bed alarm on.    Assisted CNA with brief change and purewick change.      DME Justifications:  No DME recommended requiring DME justifications    Education     Patient educated on and assisted with functional mobility as noted above.  Patient educated on PT Plan of Care.  Patient was instructed to utilize staff assistance for mobility/transfers.  White board updated regarding patient's safest level of mobility with staff assistance    Goals     Multidisciplinary Problems       Physical Therapy Goals          Problem: Physical Therapy    Goal  Priority Disciplines Outcome Interventions   Physical Therapy Goal     PT, PT/OT Progressing    Description: Goals to be met by: Discharge      Patient will increase functional independence with mobility by performin. Sit to stand transfer with Supervision  2. Bed to chair transfer with Supervision using Rolling Walker  3. Gait  x 130 feet with Stand-by Assistance using Rolling Walker vs Rollator.   4. Increased functional strength to 4/5 for B LE.  Reviewed 2025                         Time Tracking     PT Received On: 25  PT Start Time: 1454     PT Stop Time: 1523  PT Total Time (min): 29 min     Billable Minutes: Therapeutic Activity 25 mins     2025       [1]   Patient Active Problem List  Diagnosis    Primary open angle glaucoma (POAG) of right eye, moderate stage    Combined forms of age-related cataract of left eye    Arteriosclerosis of coronary artery    Chronic atrial fibrillation    Dyslipidemia    Hypertension    Tobacco use    PVD (peripheral vascular disease)    VHD (valvular heart disease)    COVID-19    HFrEF (heart failure with reduced ejection fraction)    Nonrheumatic mitral valve regurgitation    Postoperative eye state    Scrotal hematoma    Chronic obstructive pulmonary disease, unspecified    Lesion of external ear    Low back pain    Other thrombophilia    Secondary hyperaldosteronism    Positive colorectal cancer screening using Cologuard test    Acute heart failure    History of COPD    CHF (congestive heart failure)    Acute cystitis with hematuria    Tobacco dependency    Liver cirrhosis

## 2025-05-29 NOTE — PROGRESS NOTES
[facility]  Nephrology Progress Note    Patient Name: Ran Reyes Jr.  Age: 69 y.o.  : 1955  MRN: [id number]  Admission Date: 2025    Chief complaint: Fatigue and Leg Swelling (PT IN /[facility] W REPORT OF WEAKNESS/SOB AND EDEMA TO LOWER LEGS. STATES HAS NOT EATEN X 18 HRS.  CBG EN ROUTE 137. 20G IV TO LT HAND /EMS.   PT FOUND TO HAVE BED BUGS /[facility]. EKG TO BE OBTAINED. )      Hospital course  Ran Reyes Jr. is a 69 y.o. Black or  male with past medical history of HFrEF, nonobstructive CAD, HTN, COPD, PVD, HLD, persistent atrial fibrillation on Xarelto who presented for generalized weakness, fatigue, and worsening bilateral LE edema on 25. He was recently discharged on 25 from an admission for cardiogenic shock noted to have adrenal insufficiency. Since admission he has been on PO Bumex 1 mg every other day with no improvement in his edema. His renal function has been gradually worsening and He has become more lethargic over the past few days. Nephrology was consulted for management of ELIZABETH.     Subjective  Patient is lethargic this morning.    Review of Systems   Respiratory:  Positive for wheezing.    Cardiovascular:  Positive for leg swelling. Negative for chest pain.      Objective  /72   Pulse 71   Temp 97.3 °F (36.3 °C) (Oral)   Resp 18   Ht 6' (1.829 m)   Wt 82.4 kg (181 lb 10.5 oz)   SpO2 100%   BMI 24.64 kg/m²     Intake/Output Summary (Last 24 hours) at 2025 1017  Last data filed at 2025 0115  Gross per 24 hour   Intake 620 ml   Output 1100 ml   Net -480 ml       Physical Exam  General appearance:Chronically ill appearing, lethargic, disoriented.   Skin: No rashes or wounds.  HEENT: PERRLA, EOMI, no scleral icterus,  JVD.  Right sided-supraclavicular soft tissue swelling without  Crepitus or erythema  Chest: Respirations are unlabored. Wheezing present.   Heart: Irregular rhythm, systolic murmur.  Abdomen:   Distended.   : Deferred.  Extremities: BLE edema, peripheral pulses are palpable.   Neuro: Patient is lethargic and disoriented to time, place and situation.     Medications  Scheduled Meds:   atorvastatin  80 mg Oral Daily    bumetanide  2 mg Intravenous BID    carvediloL  3.125 mg Oral BID    clopidogreL  75 mg Oral Daily    folic acid  1 mg Oral Daily    metOLazone  10 mg Oral Daily    nitrofurantoin (macrocrystal-monohydrate)  100 mg Oral Q12H    pantoprazole  40 mg Oral Daily    predniSONE  15 mg Oral BID    Followed by    [START ON 6/2/2025] predniSONE  10 mg Oral BID    Followed by    [START ON 6/9/2025] predniSONE  5 mg Oral BID    QUEtiapine  75 mg Oral QHS    rivaroxaban  15 mg Oral Daily with dinner    sertraline  75 mg Oral Daily    thiamine  500 mg Oral Daily    vitamin D  1,000 Units Oral Daily     Continuous Infusions:  PRN Meds:.  Current Facility-Administered Medications:     albuterol-ipratropium, 3 mL, Nebulization, Q4H PRN    dextrose 50%, 12.5 g, Intravenous, PRN    dextrose 50%, 25 g, Intravenous, PRN    glucagon (human recombinant), 1 mg, Intramuscular, PRN    glucose, 16 g, Oral, PRN    glucose, 24 g, Oral, PRN    insulin aspart U-100, 0-5 Units, Subcutaneous, QID (AC + HS) PRN    melatonin, 6 mg, Oral, Nightly PRN    naloxone, 0.02 mg, Intravenous, PRN    sodium chloride 0.9%, 10 mL, Intravenous, Q12H PRN     Imaging:    Reviewed    Laboratory Data:    Chemistry  Recent Labs     05/27/25  0351 05/28/25  0435 05/29/25  0421   * 131* 134*   K 4.7 5.1 3.8   CO2 20* 21* 22*   BUN 55.4* 60.0* 63.8*   CREATININE 1.75* 1.86* 1.93*   EGFRNORACEVR 42 39 37   CALCIUM 9.2 9.4 9.4   MG 2.30 2.50 2.50   PHOS 4.0 3.8 3.6      LFT  Lab Results   Component Value Date    ALKPHOS 423 (H) 05/29/2025    AST 81 (H) 05/29/2025    ALT 71 (H) 05/29/2025    BILIDIR 3.7 (H) 05/28/2025    IBILI 2.10 (H) 05/28/2025    BILITOT 5.8 (H) 05/29/2025    ALBUMIN 3.3 (L) 05/29/2025      CKD-MBD  Lab Results   Component  Value Date    PTH 60.0 03/18/2024    [id number] 37.3 10/07/2020      Hematology  Recent Labs     05/27/25  0351 05/28/25  0435 05/29/25  0422   WBC 5.29 5.03 5.03   HGB 8.1* 8.2* 7.8*   HCT 24.1* 24.8* 23.7*   PLT 89* 77* 73*      Lab Results   Component Value Date    IRON 34 (L) 05/28/2025    TIBC 317 05/28/2025    FERRITIN 190.16 05/28/2025    FOLATE 18.5 04/22/2025    [id number] >2,000 (H) 04/22/2025     (H) 03/18/2024      ID  Lab Results   Component Value Date    HIV Nonreactive 03/19/2024    HEPCAB Nonreactive 05/28/2025      Additional serology  Lab Results   Component Value Date    HGBA1C 5.0 03/18/2024       Urine studies  Lab Results   Component Value Date    APPEARANCEUA Turbid (A) 05/28/2025    SGUA 1.016 05/28/2025    PROTEINUA 2+ (A) 05/28/2025    KETONESUA Negative 05/28/2025    LEUKOCYTESUR 250 (A) 05/28/2025    RBCUA 0-5 05/28/2025    WBCUA 51-99 (A) 05/28/2025    BACTERIA Occasional (A) 05/28/2025    SQEPUA Occasional (A) 05/28/2025    HYALINECASTS None Seen 05/28/2025    CREATRANDUR 28.8 (L) 04/21/2025        Impression  Acute kidney injury, prerenal azotemia secondary to cardiorenal  Mild anion gap metabolic acidosis  Microcytic anemia  Thrombocytopenia  Atrial fibrillation  Adrenal insufficiency  HFrEF with severe mitral regurgitation    Plan  Renal indices slightly worsening, patient remains volume overloaded Will give Bumex 2mg IV BID and metolazone 10mg po once. Monitor response to diuretics with urine output. Patient is iron deficient will defer replacement to primary team.     Caty Hughes NP  [facility]   5/29/2025

## 2025-05-30 PROBLEM — Z78.9 UNABLE TO CARE FOR SELF: Status: ACTIVE | Noted: 2025-05-30

## 2025-05-30 PROBLEM — E87.6 HYPOKALEMIA: Status: ACTIVE | Noted: 2025-05-30

## 2025-05-30 PROBLEM — E87.1 HYPONATREMIA: Status: ACTIVE | Noted: 2025-05-30

## 2025-05-30 LAB
ALBUMIN SERPL-MCNC: 3.4 G/DL (ref 3.4–4.8)
ALBUMIN/GLOB SERPL: 0.9 RATIO (ref 1.1–2)
ALP SERPL-CCNC: 410 UNIT/L (ref 40–150)
ALT SERPL-CCNC: 88 UNIT/L (ref 0–55)
ANION GAP SERPL CALC-SCNC: 16 MEQ/L
ANION GAP SERPL CALC-SCNC: 17 MEQ/L
AST SERPL-CCNC: 103 UNIT/L (ref 11–45)
BASOPHILS # BLD AUTO: 0.01 X10(3)/MCL
BASOPHILS NFR BLD AUTO: 0.2 %
BILIRUB SERPL-MCNC: 5.5 MG/DL
BUN SERPL-MCNC: 60 MG/DL (ref 8.4–25.7)
BUN SERPL-MCNC: 61 MG/DL (ref 8.4–25.7)
CALCIUM SERPL-MCNC: 9.7 MG/DL (ref 8.8–10)
CALCIUM SERPL-MCNC: 9.7 MG/DL (ref 8.8–10)
CHLORIDE SERPL-SCNC: 91 MMOL/L (ref 98–107)
CHLORIDE SERPL-SCNC: 92 MMOL/L (ref 98–107)
CO2 SERPL-SCNC: 28 MMOL/L (ref 23–31)
CO2 SERPL-SCNC: 29 MMOL/L (ref 23–31)
CREAT SERPL-MCNC: 1.92 MG/DL (ref 0.72–1.25)
CREAT SERPL-MCNC: 1.94 MG/DL (ref 0.72–1.25)
CREAT/UREA NIT SERPL: 31
CREAT/UREA NIT SERPL: 31
EOSINOPHIL # BLD AUTO: 0 X10(3)/MCL (ref 0–0.9)
EOSINOPHIL NFR BLD AUTO: 0 %
ERYTHROCYTE [DISTWIDTH] IN BLOOD BY AUTOMATED COUNT: 26.8 % (ref 11.5–17)
GFR SERPLBLD CREATININE-BSD FMLA CKD-EPI: 37 ML/MIN/1.73/M2
GFR SERPLBLD CREATININE-BSD FMLA CKD-EPI: 38 ML/MIN/1.73/M2
GLOBULIN SER-MCNC: 3.8 GM/DL (ref 2.4–3.5)
GLUCOSE SERPL-MCNC: 132 MG/DL (ref 82–115)
GLUCOSE SERPL-MCNC: 137 MG/DL (ref 82–115)
HCT VFR BLD AUTO: 24.3 % (ref 42–52)
HGB BLD-MCNC: 8 G/DL (ref 14–18)
IMM GRANULOCYTES # BLD AUTO: 0.03 X10(3)/MCL (ref 0–0.04)
IMM GRANULOCYTES NFR BLD AUTO: 0.6 %
LYMPHOCYTES # BLD AUTO: 0.49 X10(3)/MCL (ref 0.6–4.6)
LYMPHOCYTES NFR BLD AUTO: 10.3 %
MAGNESIUM SERPL-MCNC: 2.3 MG/DL (ref 1.6–2.6)
MCH RBC QN AUTO: 25.2 PG (ref 27–31)
MCHC RBC AUTO-ENTMCNC: 32.9 G/DL (ref 33–36)
MCV RBC AUTO: 76.7 FL (ref 80–94)
MONOCYTES # BLD AUTO: 0.32 X10(3)/MCL (ref 0.1–1.3)
MONOCYTES NFR BLD AUTO: 6.7 %
NEUTROPHILS # BLD AUTO: 3.9 X10(3)/MCL (ref 2.1–9.2)
NEUTROPHILS NFR BLD AUTO: 82.2 %
NRBC BLD AUTO-RTO: 3.2 %
PHOSPHATE SERPL-MCNC: 3.6 MG/DL (ref 2.3–4.7)
PLATELET # BLD AUTO: 70 X10(3)/MCL (ref 130–400)
PLATELETS.RETICULATED NFR BLD AUTO: 6.6 % (ref 0.9–11.2)
PMV BLD AUTO: ABNORMAL FL
POCT GLUCOSE: 208 MG/DL (ref 70–110)
POTASSIUM SERPL-SCNC: 2.6 MMOL/L (ref 3.5–5.1)
POTASSIUM SERPL-SCNC: 2.8 MMOL/L (ref 3.5–5.1)
PROT SERPL-MCNC: 7.2 GM/DL (ref 5.8–7.6)
RBC # BLD AUTO: 3.17 X10(6)/MCL (ref 4.7–6.1)
SODIUM SERPL-SCNC: 135 MMOL/L (ref 136–145)
SODIUM SERPL-SCNC: 138 MMOL/L (ref 136–145)
WBC # BLD AUTO: 4.75 X10(3)/MCL (ref 4.5–11.5)

## 2025-05-30 PROCEDURE — 85025 COMPLETE CBC W/AUTO DIFF WBC: CPT

## 2025-05-30 PROCEDURE — 99900035 HC TECH TIME PER 15 MIN (STAT)

## 2025-05-30 PROCEDURE — 25000003 PHARM REV CODE 250

## 2025-05-30 PROCEDURE — 94640 AIRWAY INHALATION TREATMENT: CPT

## 2025-05-30 PROCEDURE — 27000207 HC ISOLATION

## 2025-05-30 PROCEDURE — 11000001 HC ACUTE MED/SURG PRIVATE ROOM

## 2025-05-30 PROCEDURE — 63600175 PHARM REV CODE 636 W HCPCS

## 2025-05-30 PROCEDURE — 25000242 PHARM REV CODE 250 ALT 637 W/ HCPCS

## 2025-05-30 PROCEDURE — 36415 COLL VENOUS BLD VENIPUNCTURE: CPT

## 2025-05-30 PROCEDURE — 25000003 PHARM REV CODE 250: Performed by: INTERNAL MEDICINE

## 2025-05-30 PROCEDURE — 63600175 PHARM REV CODE 636 W HCPCS: Mod: JZ,TB | Performed by: INTERNAL MEDICINE

## 2025-05-30 PROCEDURE — 83735 ASSAY OF MAGNESIUM: CPT

## 2025-05-30 PROCEDURE — 63600175 PHARM REV CODE 636 W HCPCS: Mod: JZ,EC,TB

## 2025-05-30 PROCEDURE — 80053 COMPREHEN METABOLIC PANEL: CPT

## 2025-05-30 PROCEDURE — 94761 N-INVAS EAR/PLS OXIMETRY MLT: CPT

## 2025-05-30 PROCEDURE — 84100 ASSAY OF PHOSPHORUS: CPT

## 2025-05-30 RX ORDER — POTASSIUM CHLORIDE 20 MEQ/1
40 TABLET, EXTENDED RELEASE ORAL 3 TIMES DAILY
Status: DISCONTINUED | OUTPATIENT
Start: 2025-05-30 | End: 2025-05-31

## 2025-05-30 RX ORDER — POTASSIUM CHLORIDE 20 MEQ/1
40 TABLET, EXTENDED RELEASE ORAL 2 TIMES DAILY
Status: DISCONTINUED | OUTPATIENT
Start: 2025-05-30 | End: 2025-05-30

## 2025-05-30 RX ORDER — POTASSIUM CHLORIDE 7.45 MG/ML
10 INJECTION INTRAVENOUS
Status: DISCONTINUED | OUTPATIENT
Start: 2025-05-30 | End: 2025-05-30

## 2025-05-30 RX ADMIN — PREDNISONE 15 MG: 5 TABLET ORAL at 09:05

## 2025-05-30 RX ADMIN — THIAMINE HCL TAB 100 MG 500 MG: 100 TAB at 09:05

## 2025-05-30 RX ADMIN — RIVAROXABAN 15 MG: 10 TABLET, FILM COATED ORAL at 04:05

## 2025-05-30 RX ADMIN — SERTRALINE HYDROCHLORIDE 75 MG: 50 TABLET ORAL at 09:05

## 2025-05-30 RX ADMIN — CLOPIDOGREL BISULFATE 75 MG: 75 TABLET, FILM COATED ORAL at 09:05

## 2025-05-30 RX ADMIN — NITROFURANTOIN (MONOHYDRATE/MACROCRYSTALS) 100 MG: 25; 75 CAPSULE ORAL at 08:05

## 2025-05-30 RX ADMIN — FOLIC ACID 1 MG: 1 TABLET ORAL at 09:05

## 2025-05-30 RX ADMIN — POTASSIUM CHLORIDE 10 MEQ: 7.46 INJECTION, SOLUTION INTRAVENOUS at 06:05

## 2025-05-30 RX ADMIN — BUMETANIDE 2 MG: 0.25 INJECTION INTRAMUSCULAR; INTRAVENOUS at 09:05

## 2025-05-30 RX ADMIN — ATORVASTATIN CALCIUM 80 MG: 40 TABLET, FILM COATED ORAL at 09:05

## 2025-05-30 RX ADMIN — EPOETIN ALFA-EPBX 10000 UNITS: 10000 INJECTION, SOLUTION INTRAVENOUS; SUBCUTANEOUS at 01:05

## 2025-05-30 RX ADMIN — SODIUM CHLORIDE 125 MG: 9 INJECTION, SOLUTION INTRAVENOUS at 09:05

## 2025-05-30 RX ADMIN — METOLAZONE 10 MG: 5 TABLET ORAL at 09:05

## 2025-05-30 RX ADMIN — CARVEDILOL 3.12 MG: 3.12 TABLET, FILM COATED ORAL at 09:05

## 2025-05-30 RX ADMIN — THERA TABS 1 TABLET: TAB at 08:05

## 2025-05-30 RX ADMIN — NITROFURANTOIN (MONOHYDRATE/MACROCRYSTALS) 100 MG: 25; 75 CAPSULE ORAL at 09:05

## 2025-05-30 RX ADMIN — IPRATROPIUM BROMIDE AND ALBUTEROL SULFATE 3 ML: 2.5; .5 SOLUTION RESPIRATORY (INHALATION) at 05:05

## 2025-05-30 RX ADMIN — Medication 1000 UNITS: at 09:05

## 2025-05-30 RX ADMIN — POTASSIUM CHLORIDE 40 MEQ: 1500 TABLET, EXTENDED RELEASE ORAL at 09:05

## 2025-05-30 RX ADMIN — POTASSIUM CHLORIDE 40 MEQ: 1500 TABLET, EXTENDED RELEASE ORAL at 04:05

## 2025-05-30 RX ADMIN — QUETIAPINE FUMARATE 75 MG: 25 TABLET ORAL at 09:05

## 2025-05-30 RX ADMIN — PANTOPRAZOLE SODIUM 40 MG: 40 TABLET, DELAYED RELEASE ORAL at 09:05

## 2025-05-30 NOTE — PROGRESS NOTES
05/30/25 1020   Missed Time Reason   OT Attempted Eval Date 05/30/25   OT Attempted Eval Time 1020   Missed Treatment Reason Patient unwilling to participate     Will follow up with patient for treatment when schedule permits.

## 2025-05-30 NOTE — PLAN OF CARE
Problem: Heart Failure  Goal: Optimal Coping  Outcome: Progressing  Goal: Optimal Cardiac Output  Outcome: Progressing  Goal: Stable Heart Rate and Rhythm  Outcome: Progressing  Goal: Optimal Functional Ability  Outcome: Progressing  Goal: Fluid and Electrolyte Balance  Outcome: Progressing  Goal: Improved Oral Intake  Outcome: Progressing  Goal: Effective Oxygenation and Ventilation  Outcome: Progressing  Goal: Effective Breathing Pattern During Sleep  Outcome: Progressing     Problem: Skin Injury Risk Increased  Goal: Skin Health and Integrity  Outcome: Progressing     Problem: Comorbidity Management  Goal: Maintenance of COPD Symptom Control  Outcome: Progressing  Goal: Maintenance of Heart Failure Symptom Control  Outcome: Progressing  Goal: Blood Pressure in Desired Range  Outcome: Progressing     Problem: Infection  Goal: Absence of Infection Signs and Symptoms  Outcome: Progressing     Problem: Acute Kidney Injury/Impairment  Goal: Fluid and Electrolyte Balance  Outcome: Progressing  Goal: Improved Oral Intake  Outcome: Progressing  Goal: Effective Renal Function  Outcome: Progressing     Problem: Dysrhythmia  Goal: Normalized Cardiac Rhythm  Outcome: Progressing     Problem: Fall Injury Risk  Goal: Absence of Fall and Fall-Related Injury  Outcome: Progressing     Problem: Adult Inpatient Plan of Care  Goal: Plan of Care Review  Outcome: Progressing  Flowsheets (Taken 5/30/2025 5182)  Plan of Care Reviewed With: patient  Goal: Patient-Specific Goal (Individualized)  Outcome: Progressing  Flowsheets (Taken 5/30/2025 7934)  Individualized Care Needs: n/a  Anxieties, Fears or Concerns: n/a  Patient/Family-Specific Goals (Include Timeframe): n/a  Goal: Absence of Hospital-Acquired Illness or Injury  Outcome: Progressing  Goal: Optimal Comfort and Wellbeing  Outcome: Progressing  Goal: Readiness for Transition of Care  Outcome: Progressing

## 2025-05-30 NOTE — PROGRESS NOTES
Inpatient Nutrition Assessment    Admit Date: 5/23/2025   Total duration of encounter: 7 days   Patient Age: 69 y.o.    Nutrition Recommendation/Prescription     Easy to Chew, Heart Healthy diet -- liberalize to No added salt to encourage po intake  Change Boost to Boost Plus (provides 360 kcal, 14 g protein per serving) TID to better meet nutrition   Monitor Weight daily  Continue Thiamine, MVI, Folic acid as ordered -- hx ETOH use  Replace electrolytes as needed    Communication of Recommendations: reviewed with nurse and reviewed with patient    Nutrition Assessment     Malnutrition Assessment/Nutrition-Focused Physical Exam       Malnutrition Level: other (see comments) (Does not meet criteria) (05/26/25 1135)  Energy Intake (Malnutrition): other (see comments) (Does not meet criteria) (05/26/25 1135)  Weight Loss (Malnutrition): other (see comments) (Does not meet criteria) (05/26/25 1135)     Orbital Region (Subcutaneous Fat Loss): well nourished           [place] (Muscle Loss): well nourished                       Fluid Accumulation (Malnutrition): moderate (05/26/25 1135)        A minimum of two characteristics is recommended for diagnosis of either severe or non-severe malnutrition.    Chart Review    Reason Seen: continuous nutrition monitoring and follow-up    Malnutrition Screening Tool Results   Have you recently lost weight without trying?: No  Have you been eating poorly because of a decreased appetite?: No   MST Score: 0   Diagnosis:  UTI  ELIZABETH  Hyponatremia   Anion gap metabolic acidosis   Troponinemia  Chronic persistent A-fib   Nonobstructive CAD (per 3/2024 [facility] [place]), NICM  HFrEF   HTN  pHTN  PVD   HLD  Adrenal insufficiency   COPD  Anxiety/insomnia    Relevant Medical History: Tobacco Dependence, COPD, HTN, pHTN, HLD, CAD, HFrEF, PVD, Claudine's gangrene, ETOH use    Scheduled Medications:  atorvastatin, 80 mg, Daily  bumetanide, 2 mg, BID  carvediloL, 3.125 mg, BID  clopidogreL, 75 mg,  Daily  epoetin deep-ebpx (RETACRIT) injection, 10,000 Units, Q7 Days  ferric gluconate (Ferrlecit) IVPB, 125 mg, Daily  folic acid, 1 mg, Daily  metOLazone, 10 mg, Daily  multivitamin, 1 tablet, Daily  nitrofurantoin (macrocrystal-monohydrate), 100 mg, Q12H  pantoprazole, 40 mg, Daily  potassium chloride, 40 mEq, TID  predniSONE, 15 mg, BID   Followed by  [START ON 6/2/2025] predniSONE, 10 mg, BID   Followed by  [START ON 6/9/2025] predniSONE, 5 mg, BID  QUEtiapine, 75 mg, QHS  rivaroxaban, 15 mg, Daily with dinner  sertraline, 75 mg, Daily  thiamine, 500 mg, Daily  vitamin D, 1,000 Units, Daily    Continuous Infusions:   PRN Medications:  albuterol-ipratropium, 3 mL, Q4H PRN  dextrose 50%, 12.5 g, PRN  dextrose 50%, 25 g, PRN  glucagon (human recombinant), 1 mg, PRN  glucose, 16 g, PRN  glucose, 24 g, PRN  insulin aspart U-100, 0-5 Units, QID (AC + HS) PRN  melatonin, 6 mg, Nightly PRN  naloxone, 0.02 mg, PRN  sodium chloride 0.9%, 10 mL, Q12H PRN    Calorie Containing IV Medications: no significant kcals from medications at this time    Recent Labs   Lab 05/24/25  0328 05/25/25  0337 05/26/25  0346 05/27/25  0351 05/28/25  0435 05/28/25  0857 05/29/25  0421 05/29/25  0422 05/30/25  0421 05/30/25  1018   * 129* 127* 130* 131*  --  134*  --  135* 138   K 4.3 4.5 4.9 4.7 5.1  --  3.8  --  2.6* 2.8*   CALCIUM 9.3 9.3 9.2 9.2 9.4  --  9.4  --  9.7 9.7   PHOS 4.1 4.2 4.5 4.0 3.8  --  3.6  --  3.6  --    MG 1.90 2.20 2.20 2.30 2.50  --  2.50  --  2.30  --    CL 96* 96* 94* 95* 96*  --  96*  --  91* 92*   CO2 22* 19* 19* 20* 21*  --  22*  --  28 29   BUN 38.6* 46.3* 53.2* 55.4* 60.0*  --  63.8*  --  60.0* 61.0*   CREATININE 1.33* 1.38* 1.61* 1.75* 1.86*  --  1.93*  --  1.92* 1.94*   EGFRNORACEVR 59 56 47 42 39  --  37  --  38 37   * 148* 158* 140* 132*  --  145*  --  137* 132*   BILITOT 5.0* 4.3* 4.2* 5.2* 5.8*  5.8*  --  5.8*  --  5.5*  --    ALKPHOS 256* 284* 292* 344* 383*  --  423*  --  410*  --    ALT  35 45 47 52 61*  --  71*  --  88*  --    AST 34 44 45 51* 66*  --  81*  --  103*  --    ALBUMIN 3.3* 3.4 3.4 3.5 3.4  --  3.3*  --  3.4  --    AMMONIA  --   --   --   --   --  41.6  --   --   --   --    WBC 5.83 5.36 5.99 5.29 5.03  --   --  5.03 4.75  --    HGB 8.2* 8.2* 8.1* 8.1* 8.2*  --   --  7.8* 8.0*  --    HCT 24.6* 24.9* 25.1* 24.1* 24.8*  --   --  23.7* 24.3*  --      Nutrition Orders:  Diet Easy to Chew (IDDSI Level 7) No Added Salt / 4 gm Sodium  Dietary nutrition supplements TID; Boost Plus Nutritional Drink - Rich Chocolate    Appetite/Oral Intake: poor/25-50% of meals  Factors Affecting Nutritional Intake: decreased appetite  Social Needs Impacting Access to Food: none identified  Food/Sikhism/Cultural Preferences: none reported  Food Allergies: no known food allergies  Last Bowel Movement: 05/29/25  Wound(s):  skin intact    Comments    5/30/25 -- Pt continues with decreased oral intake of meals, drinking boost -- will change to Boost Plus to better meet nutrition; Denies n/v or abdominal pain, complains of food with no seasoning, pt without food preferences at this time stating he likes all foods; pt also with confusion over the last couple days, improvement this am per nursing; h/o ETOH use reported, pt started on Folic acid, thiamine, MVI; diet change to Soft & Bite size per MD for ease of chewing; pt with weight loss since admit, most likely fluid related, diuresing -- will continue to monitor; K (L) -- replacing, continue MVI    5/26/25 -- Pt reports fair appetite over the last week also dislike of heart healthy diet, pt without food pretences this visit, agreeable to Boost in chocolate flavor; denies n/v/d; wieght fluctuation noted per EMR weight history review most likely related to fluid with h/o HF, continue heart healthy diet as ordered; Glu (H) - no h/o DM, most likely steroid induced, will continue to monitor for need to adjust diet    Anthropometrics    Height: 6' (182.9 cm),    Last  Weight: 82.6 kg (182 lb 1.6 oz) (05/30/25 0625), Weight Method: Bed Scale  BMI (Calculated): 24.7  BMI Classification: overweight (BMI 25-29.9)        Ideal Body Weight (IBW), Male: 178 lb                       Usual Body Weight (UBW), kg:  (200-220 lb)        Usual Weight Provided By: patient and EMR weight history    Wt Readings from Last 5 Encounters:   05/30/25 82.6 kg (182 lb 1.6 oz)   05/19/25 94.3 kg (208 lb)   03/24/25 102.1 kg (225 lb)   03/07/25 101.2 kg (223 lb)   03/04/25 102.5 kg (225 lb 14.4 oz)     Weight Change(s) Since Admission: new admit; 1-2+ edema present  Wt Readings from Last 1 Encounters:   05/30/25 0625 82.6 kg (182 lb 1.6 oz)   05/29/25 0700 82.4 kg (181 lb 10.5 oz)   05/29/25 0115 82.4 kg (181 lb 10.5 oz)   05/28/25 0700 82.6 kg (182 lb 1.6 oz)   05/28/25 0607 82.6 kg (182 lb 1.6 oz)   05/27/25 0645 80.7 kg (178 lb)   05/26/25 0615 95.1 kg (209 lb 11.2 oz)   05/25/25 0700 91.4 kg (201 lb 8 oz)   05/25/25 0500 91.4 kg (201 lb 8 oz)   05/24/25 0700 94.1 kg (207 lb 7.3 oz)   05/24/25 0545 94.1 kg (207 lb 8 oz)   05/23/25 1834 94 kg (207 lb 3.7 oz)   Admit Weight: 94 kg (207 lb 3.7 oz) (05/23/25 1834), Weight Method: Estimated    Estimated Needs    Weight Used For Calorie Calculations: 95 kg (209 lb 7 oz)  Energy Calorie Requirements (kcal): 6370-3102 kcal (23 - 25 kcal/kg)  Energy Need Method: Kcal/kg  Weight Used For Protein Calculations: 95 kg (209 lb 7 oz)  Protein Requirements: 76-95 gm (0.8-1 gm/kg)  Fluid Requirements (mL): 6646-3601 ml (1ml/kcal)        Enteral Nutrition     Patient not receiving enteral nutrition at this time.    Parenteral Nutrition     Patient not receiving parenteral nutrition support at this time.    Evaluation of Received Nutrient Intake    Calories: not meeting estimated needs  Protein: not meeting estimated needs    Patient Education     Not applicable.    Nutrition Diagnosis     PES: Inadequate oral intake related to acute illness as evidenced by < 75%  nutrition intake x 1 week. (active)     PES:            Nutrition Interventions     Intervention(s): modified composition of meals/snacks, commercial beverage, and collaboration with other providers  Intervention(s):      Goal: Meet greater than 80% of nutritional needs by follow-up. (goal not met)  Goal: Maintain weight throughout hospitalization. (goal progressing)    Nutrition Goals & Monitoring     Dietitian will monitor: food and beverage intake, weight, and electrolyte/renal panel  Discharge planning: continue heart healthy diet  Nutrition Risk/Follow-Up: dietitian will follow-up one time per week   Please consult if re-assessment needed sooner.

## 2025-05-30 NOTE — CONSULTS
Gastroenterology/Hepatology Initial Consult Note    Patient Name: Ran Reyes Jr.  Age: 69 y.o.  : 1955  MRN: [id number]  Admission Date: 2025    Reason for Consult:      Medical Management  Chief Complaint   Patient presents with    Fatigue    Leg Swelling     PT IN /AASI W REPORT OF WEAKNESS/SOB AND EDEMA TO LOWER LEGS. STATES HAS NOT EATEN X 18 HRS.  CBG EN ROUTE 137. 20G IV TO LT HAND /EMS.   PT FOUND TO HAVE BED BUGS /AASI. EKG TO BE OBTAINED.          Self, Aaareferral    HPI:     Ran Reyes Jr. is a 69 y.o. 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    GI consulted for concern for liver disease.     Review of labs show intermittent elevations of TB as far back as .  More recently with persistent hyperbilirubinemia the past several months.  Platelets low normal but no thrombocytopenic this admission.  INR elevated since 2018 but has been on anticoagulation for a fib this time.  No recent contrasted CT scan but recent noncontrasted CT scan and abd US showing nodular liver contour.      Viral hepatitis negative.    ECHO: EF 22%, grade III diastolic dysfunction, mod TR, severe MR, PASP 59 mmHg c/w moderate pulmonary HTN    He denies any major abdominal issues.  He denies any abdominal pain, swelling, heartburn, reflux, dysphagia, constipation or diarrhea.  He reports daily stools that are normal without any rectal bleeding or melena.  He's prescribed pantoprazole as an outpatient - presumably due to prior EGD findings.  He reports compliance with medications.     2024 EGD (Dr. Calderon): LA grade B esophagitis.  Erosive gastritis - HP negative.  Duodenitis.    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     He admits to drinking beer.  He says 3 beers every day or every other day.  He denies much liquor use.   According to his sister, he will drink an 18 pack of beer in a day or a few days.  She also states he drinks 1/2 pint to a pint of liquor within a week.  He will call her or his neighbor to bring him his beer/liquor since he cannot/does not walk. PETH level pending.     He smokes 1 ppd.  He denies any recreational drug use.  He denies any prior colonoscopy.   MASLD risk factors:       Abiodun Recinos DO    Past Medical History:   Diagnosis Date    A-fib     Anticoagulant long-term use     Anxiety     Aortic aneurysm     Cataract     CHF (congestive heart failure)     Chronic atrial fibrillation     COPD (chronic obstructive pulmonary disease)     Coronary artery disease     Depression     HLD (hyperlipidemia)     Hypertension     Mitral regurgitation     PAD (peripheral artery disease)     Primary open angle glaucoma (POAG)         Past Surgical History:   Procedure Laterality Date    ANGIOGRAM, CORONARY, WITH LEFT HEART CATHETERIZATION N/A 03/26/2024    Procedure: Angiogram, Coronary, with Left Heart Cath;  Surgeon: Adriano Montiel MD;  Location: [facility] CATH LAB;  Service: Cardiology;  Laterality: N/A;    ATHERECTOMY OF PERIPHERAL VESSEL Left 09/12/2022    LEFT SFA ATHERECTOMY, BALLOON ANGIOPLASTY    CATARACT EXTRACTION W/  INTRAOCULAR LENS IMPLANT Left 03/09/2023    Procedure: EXTRACTION, CATARACT, WITH IOL INSERTION;  Surgeon: Georgi Borja MD;  Location: [facility];  Service: Ophthalmology;  Laterality: Left;  19.5  mac    EGD, WITH CLOSED BIOPSY  03/22/2024    Procedure: EGD, WITH CLOSED BIOPSY;  Surgeon: Ronald Calderon MD;  Location: [facility] ENDOSCOPY;  Service: Gastroenterology;;    ESOPHAGOGASTRODUODENOSCOPY N/A 03/22/2024    Procedure: EGD;  Surgeon: Ronald Calderon MD;  Location: [facility] ENDOSCOPY;  Service: Gastroenterology;  Laterality: N/A;    Heart Stent N/A     > 10yrs. AGO    INCISION AND DRAINAGE N/A 03/18/2024    Procedure: Incision and Drainage;  Surgeon: Fahad Rivas MD;   Location: [facility] OR;  Service: Urology;  Laterality: N/A;  I&D SCROTAL ABSCESS    INSERTION OF STENT INTO PERIPHERAL VESSEL Right 10/17/2022    RIGHT SFA ATHERECTOMY, BALLOON ANGIOPLASTY, STENT; RIGHT ANTERIOR TIBIAL ARTERY ATHERECTOMY, BALLOON ANGIOPLASTY    ORCHIECTOMY Left 03/18/2024    Procedure: ORCHIECTOMY;  Surgeon: Fahad Rivas MD;  Location: [facility] OR;  Service: Urology;  Laterality: Left;        Family History   Problem Relation Name Age of Onset    Cancer Mother      Hypertension Mother      Heart failure Father      Stroke Father      Hypertension Father      No Known Problems Sister         Social History     Tobacco Use    Smoking status: Every Day     Current packs/day: 0.50     Average packs/day: 0.8 packs/day for 50.4 years (41.0 ttl pk-yrs)     Types: Cigarettes     Start date: 1975     Passive exposure: Current    Smokeless tobacco: Never    Tobacco comments:     States smoke 2-3 cigarettes per day   Substance Use Topics    Alcohol use: Yes     Alcohol/week: 3.0 standard drinks of alcohol     Types: 3 Cans of beer per week     Comment: socially         Review of patient's allergies indicates:  No Known Allergies     Prescriptions Prior to Admission[1]      INPATIENT MEDICATIONS    Scheduled Meds:   atorvastatin  80 mg Oral Daily    bumetanide  2 mg Intravenous BID    carvediloL  3.125 mg Oral BID    clopidogreL  75 mg Oral Daily    epoetin deep-ebpx (RETACRIT) injection  10,000 Units Subcutaneous Q7 Days    ferric gluconate (Ferrlecit) IVPB  125 mg Intravenous Daily    folic acid  1 mg Oral Daily    metOLazone  10 mg Oral Daily    multivitamin  1 tablet Oral Daily    nitrofurantoin (macrocrystal-monohydrate)  100 mg Oral Q12H    pantoprazole  40 mg Oral Daily    potassium chloride  40 mEq Oral TID    predniSONE  15 mg Oral BID    Followed by    [START ON 6/2/2025] predniSONE  10 mg Oral BID    Followed by    [START ON 6/9/2025] predniSONE  5 mg Oral BID    QUEtiapine  75 mg Oral QHS     rivaroxaban  15 mg Oral Daily with dinner    sertraline  75 mg Oral Daily    thiamine  500 mg Oral Daily    vitamin D  1,000 Units Oral Daily     Continuous Infusions:  PRN Meds:    Current Facility-Administered Medications:     albuterol-ipratropium, 3 mL, Nebulization, Q4H PRN    dextrose 50%, 12.5 g, Intravenous, PRN    dextrose 50%, 25 g, Intravenous, PRN    glucagon (human recombinant), 1 mg, Intramuscular, PRN    glucose, 16 g, Oral, PRN    glucose, 24 g, Oral, PRN    insulin aspart U-100, 0-5 Units, Subcutaneous, QID (AC + HS) PRN    melatonin, 6 mg, Oral, Nightly PRN    naloxone, 0.02 mg, Intravenous, PRN    sodium chloride 0.9%, 10 mL, Intravenous, Q12H PRN          Review of Systems:       Review of Systems   Constitutional:  Negative for appetite change and unexpected weight change.   HENT:  Negative for trouble swallowing.    Respiratory: Negative.     Cardiovascular:  Positive for leg swelling.   Gastrointestinal:  Negative for abdominal pain, blood in stool, change in bowel habit, constipation and diarrhea.   Musculoskeletal: Negative.    Neurological: Negative.    Hematological: Negative.    Psychiatric/Behavioral: Negative.     All other systems reviewed and are negative.         Objective:       VITAL SIGNS: 24 HR MIN & MAX LAST    Temp  Min: 97.4 °F (36.3 °C)  Max: 98.5 °F (36.9 °C)  97.8 °F (36.6 °C)        BP  Min: 108/73  Max: 115/72  108/73     Pulse  Min: 51  Max: 66  61     Resp  Min: 17  Max: 18  18    SpO2  Min: 93 %  Max: 97 %  96 %        Physical Exam  Vitals reviewed.   Constitutional:       Appearance: He is ill-appearing.   HENT:      Head: Normocephalic and atraumatic.      Mouth/Throat:      Mouth: Mucous membranes are moist.   Eyes:      Extraocular Movements: Extraocular movements intact.      Conjunctiva/sclera: Conjunctivae normal.   Cardiovascular:      Rate and Rhythm: Normal rate. Rhythm irregularly irregular.      Heart sounds: Murmur heard.      Systolic murmur is present.    Pulmonary:      Effort: Pulmonary effort is normal.      Breath sounds: Normal breath sounds.   Abdominal:      General: Bowel sounds are normal.      Palpations: Abdomen is soft.      Tenderness: There is no abdominal tenderness.   Musculoskeletal:      Cervical back: Normal range of motion.      Right lower le+ Pitting Edema present.      Left lower le+ Pitting Edema present.   Skin:     General: Skin is warm and dry.   Neurological:      General: No focal deficit present.      Mental Status: He is alert and oriented to person, place, and time.   Psychiatric:         Mood and Affect: Mood normal.         Behavior: Behavior normal.              LABS:      Recent Labs   Lab 25  0346 25  0351 25  0435 25  04225  042   WBC 5.99 5.29 5.03 5.03 4.75   HGB 8.1* 8.1* 8.2* 7.8* 8.0*   HCT 25.1* 24.1* 24.8* 23.7* 24.3*   * 89* 77* 73* 70*   MCV 78.2* 76.0* 78.5* 76.9* 76.7*       Recent Labs   Lab 25  1920 25  0328 25  0337 25  0346 25  03525  04225  042   HGB 8.6* 8.2* 8.2* 8.1* 8.1* 8.2* 7.8* 8.0*   HCT 25.9* 24.6* 24.9* 25.1* 24.1* 24.8* 23.7* 24.3*        Recent Labs   Lab 25  0346 25  0351 25  0435 25  04225  042   * 130* 131* 134* 135*   K 4.9 4.7 5.1 3.8 2.6*   CO2 19* 20* 21* 22* 28   BUN 53.2* 55.4* 60.0* 63.8* 60.0*   CREATININE 1.61* 1.75* 1.86* 1.93* 1.92*   BILITOT 4.2* 5.2* 5.8*  5.8* 5.8* 5.5*   BILIDIR  --   --  3.7*  --   --    IBILI  --   --  2.10*  --   --    ALKPHOS 292* 344* 383* 423* 410*   AST 45 51* 66* 81* 103*   ALT 47 52 61* 71* 88*   ALBUMIN 3.4 3.5 3.4 3.3* 3.4        Recent Labs   Lab 25  0857   INR 3.4*   PROTIME 35.2*        Recent Labs   Lab 25  1042   IRON 34*   FERRITIN 190.16          IMAGING:   US Retroperitoneal Complete  Result Date: 2025  EXAMINATION: US RETROPERITONEAL COMPLETE CLINICAL HISTORY: ELIZABETH;   weakness  TECHNIQUE: Ultrasound of the kidneys and urinary bladder was performed including color flow and grayscale evaluation of the kidneys. COMPARISON: CT chest abdomen pelvis 05/03/2025 FINDINGS: RIGHT KIDNEY: The right kidney measures 10.5 cm.  No hydronephrosis. LEFT KIDNEY: The left kidney measures 9.2 cm. No hydronephrosis.  Lobulated contour. BLADDER: Bladder volume calculated 325 mL. OTHER: Aorta and IVC not imaged.     Normal sized, nonobstructed kidneys. Electronically signed by: Sailaja Atkinson Date:    05/28/2025 Time:    14:55    US Abdomen Limited  Result Date: 5/28/2025  EXAMINATION: US ABDOMEN LIMITED CLINICAL HISTORY: Liver cirrhosis; TECHNIQUE: Limited ultrasound of the right upper quadrant of the abdomen was performed. COMPARISON: CT chest abdomen pelvis 05/03/2025 FINDINGS: LIVER: 20 cm cranial caudal at the midclavicular line. Nodular surface contour.  No focal mass appreciable. Portal vein is patent with appropriate directional flow.  Biphasic flow at the portal vein as can be seen with heart failure or portal hypertension. PANCREAS: Obscured by overlying bowel gas. GALLBLADDER: Gallbladder is contracted.  No appreciable shadowing gallstone. BILE DUCTS: 3 mm common bile duct.  Distal common bile duct is obscured by shadowing bowel gas. INFERIOR VENA CAVA: Normal in appearance. RIGHT KIDNEY: Images of the right kidney were performed on concurrent retroperitoneal sonogram.  See separate report. OTHER: Small volume perihepatic free fluid.     Hepatomegaly.  Nodular hepatic surface contour Small volume perihepatic free fluid Biphasic flow at the portal vein as can be seen with heart failure or portal hypertension Electronically signed by: Sailaja Atkinson Date:    05/28/2025 Time:    14:54    X-Ray Chest 1 View  Result Date: 5/23/2025  EXAMINATION XR CHEST 1 VIEW CLINICAL HISTORY weakness; TECHNIQUE A total of 1 frontal image(s) submitted of the chest. COMPARISON 4 May 2025 FINDINGS Lines/tubes/devices:  ECG leads overlie the imaged region.  Right PICC no longer visualized. There is similar enlargement the cardiac silhouette, central vascular congestion, and widespread lung interstitial changes.  The trachea is midline. No new or worsening consolidation is developed in the interval.  There is no large pleural effusion or convincing pneumothorax. Regional osseous structures and extrathoracic soft tissues are similar. IMPRESSION No acute process or other adverse interval change.  Chronic secondary findings discussed above. Electronically signed by: Ramana Mcleod Date:    05/23/2025 Time:    19:54    CT Head Without Contrast  Result Date: 5/13/2025  EXAMINATION: CT HEAD WITHOUT CONTRAST INDICATION: Mental status change, unknown cause; TECHNIQUE: Contiguous axial images are acquired through the head without IV contrast.  These images were reconstructed into the coronal and sagittal plane.  Automated exposure control, dose radiation lowering technique was utilized. COMPARISON: Head CT from 12/05/2024 FINDINGS: Hemorrhage: No acute intracranial hemorrhage is seen. Brain parenchyma: Subtle stable appearing microvascular change is seen in portions of the periventricular and deep white matter tracts. Cerebellum: Unremarkable. Vascular: Unremarkable venous sinuses. Sella and skull base: The sella appears to be within normal limits for age. Intracranial calcifications: Incidental note is made of bilateral choroid plexus calcification. Incidental note is made of some pineal region calcification. Incidental note is made of some calcification of the falx. Calvarium: No acute linear or depressed skull fracture is seen. Maxillofacial Structures: Paranasal sinuses: The visualized paranasal sinuses appear clear with no mucoperiosteal thickening or air fluid levels identified. Orbits: The orbits appear unremarkable. Zygomatic arches: The zygomatic arches are intact and unremarkable. Temporal bones and mastoids: The temporal bones and  mastoids appear unremarkable. TMJ: The mandibular condyles appear normally placed with respect to the mandibular fossa. Nasal Bones: No displaced nasal bone fracture is seen. Visualized upper cervical spine: The visualized cervical spine appears unremarkable.     1. No evidence of acute intracranial abnormality. [employer] concurrence Electronically signed by: Harris Chaudhry MD Date:    05/13/2025 Time:    07:45    Echo  Result Date: 5/9/2025    Mitral Valve: There is severe regurgitation with a centrally directed jet.     X-Ray Chest 1 View  Result Date: 5/5/2025  EXAMINATION: XR CHEST 1 VIEW CLINICAL HISTORY: SOB; TECHNIQUE: Single frontal view of the chest was performed. COMPARISON: 04/30/2025 FINDINGS: PICC line remains in place.  There is cephalization of blood flow.  Heart is enlarged.  There is mild blunting of both costophrenic angles cannot rule out small effusions.  There is vascular calcification noted.     Changes most consistent with pulmonary edema Electronically signed by: Kirill Zavala MD Date:    05/05/2025 Time:    06:58    CT Chest Abdomen Pelvis Without Contrast (XPD)  Result Date: 5/3/2025  EXAMINATION CT CHEST ABDOMEN PELVIS WITHOUT CONTRAST(XPD) CLINICAL HISTORY Cardiogenic shock; TECHNIQUE Non-contrast helical-acquisition CT images were obtained and multiplanar reformats accomplished by a CT technologist at a separate workstation, pushed to PACS for physician review.  Enteric contrast was not utilized. COMPARISON None available at the time of initial interpretation. FINDINGS Images were reviewed in soft tissue, lung, and bone windows. Exam quality: Inherently limited vascular and soft tissue assessment due to utilization of non-contrast protocol. Lines/tubes: Right upper extremity PICC terminates at the lower SVC. There is marked global dilatation of the cardiac chambers.  Minimal volume pericardial fluid is present.  Extensive burden of coronary calcium is present, as well as widespread  calcified plaque of the aorta and its visualized branch vessels.  Small saccular infrarenal aortic aneurysm is present, measuring 3.2 cm in length and diameter of 3.4 cm x 2.3 cm (AP x Tx). Central airway patency is widely maintained.  Heterogeneous attenuation of the posterior lung zones likely represents dependent ventilatory change, with no organized airspace consolidation identified.  There are minimal volume bilateral pleural effusions (right > left).  No pneumothorax is evident. No definite acute process or space-occupying lesion of the upper abdominal solid organs is appreciated by non-contrast appearance.  There is irregular nodularity of the liver surface suggestive of cirrhotic changes.  The gallbladder and bile ducts are unremarkable.  No findings to suggest high-grade obstructive process of the GI or urinary tracts.  The urinary bladder is mildly distended with somewhat greater than expected mural prominence for degree of luminal expansion.  Widespread prostate calcifications are incidentally noted.  The appendix is poorly visualized but there are no secondary findings to suggest acute appendicitis. Minimal volume disorganized peripubic fluid is present, as well as trace free fluid along the bilateral pericolic gutters.  No drainable collection is evident.  There is no pneumoperitoneum.  No pathologically enlarged or necrotic appearing lymph nodes are identified. There is no convincing acute abnormality of the visualized subcutaneous tissues and regional musculature.  Small uncomplicated fat containing umbilical and bilateral inguinal hernias are incidentally noted.  There are degenerative alterations of the spinal column and bony pelvis.  No acute or destructive skeletal process is visualized. IMPRESSION 1. Marked cardiac enlargement and minimal bilateral pleural effusions. 2. Trace ascites. 3. Hepatic findings suggesting changes of cirrhosis. 4. Incidental small saccular infrarenal AAA. 5. Slightly  greater than expected prominence of the urinary bladder wall may reflect changes of cystitis. 6. Additional chronic secondary findings discussed above. RADIATION DOSE Automated tube current modulation, weight-based exposure dosing, and/or iterative reconstruction technique utilized to reach lowest reasonably achievable exposure rate. DLP: 477 mGy*cm Electronically signed by: Ramana Mcleod Date:    05/03/2025 Time:    14:15    X-Ray Abdomen AP 1 View  Result Date: 5/2/2025  EXAMINATION: XR ABDOMEN AP 1 VIEW CLINICAL HISTORY: distension; TECHNIQUE: AP View(s) of the abdomen. COMPARISON: Radiography 02/26/2025 FINDINGS: No dilated bowel identified.  Small volume stool.     Nonobstructive bowel gas pattern. Electronically signed by: Don Mckeon Date:    05/02/2025 Time:    14:08    Echo  Result Date: 5/1/2025    Left Ventricle: The left ventricle is severely dilated. Severely increased ventricular mass. Mildly increased wall thickness. There is severe eccentric hypertrophy. Severe global hypokinesis present. There is severely reduced systolic function. Quantitated ejection fraction is 22%. Grade III diastolic dysfunction.   Right Ventricle: The right ventricle has moderate enlargement. Systolic function is moderately reduced.   Left Atrium: Left atrium is severely dilated measuring 92ml/m2.   Right Atrium: Right atrium is severely dilated.   Aortic Valve: The aortic valve is a trileaflet valve. There is mild aortic valve sclerosis. There is no stenosis. There is no significant regurgitation.   Mitral Valve: The mitral valve is structurally normal. There is no stenosis. There is severe central regurgitation.   Tricuspid Valve: The tricuspid valve is structurally normal. There is moderate regurgitation.   Pulmonary Artery: There is moderate pulmonary hypertension. The estimated pulmonary artery systolic pressure is 59 mmHg.   IVC/SVC: Elevated venous pressure at 15 mmHg.   Pericardium: There is no pericardial effusion.          Assessment / Plan:     Ran Reyes Jr. is a 69 y.o. 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    GI consulted for concern for liver disease.     Likely cirrhosis - cardiac vs alcohol vs multifactorial  No biliary concern at this time - gallbladder contracted, CBD 3 mm  Likely does have a component of congestive hepatopathy given volume state - currently diuresing  Hyponatremia improving - combination of beer use, liver and cardiac disease    - MELD-Na: 32 (INR 3.4 but on rivaroxaban)  - CTP: C (driven by INR elevation)  - INR 3.4 - recommend holding rivaroxaban for now  - Needs complete alcohol cessation  - F/u PETH level   - Treat underlying cardiac issues - diuresis per nephrology and cardiology    Ascites: none  Encephalopathy: no issues  Varices: None on EGD in March 2024  HCC screening: US without lesion.  Check AFP    2. Acute on chronic KI  - Has been getting diuresis since admission - management by nephrology  - Creatinine uptrended but stable    3. Anemia, MCV low  - Ferritin borderline in the past (30s)  - No overt bleeding  - On plavix and xarelto - INR 3.4 now  - No prior colonoscopy  - Prior EGD with inflammation - on ppi  - Ferritin 190 now but likely mixed picture of NANDO and AoCD.    - Recommend iron supplementation         Simran Arzate MD, MPH  Gastroenterology and Hepatology   of Clinical Medicine  [facility]         [1]   Medications Prior to Admission   Medication Sig Dispense Refill Last Dose/Taking    acetaminophen 325 mg Cap Take 650 mg by mouth 2 (two) times daily as needed.       albuterol (PROVENTIL/VENTOLIN HFA) 90 mcg/actuation inhaler Inhale 2 puffs into the lungs every 6 (six) hours as needed for Shortness of Breath or Wheezing. Rescue 8 g 0     albuterol-ipratropium  (DUO-NEB) 2.5 mg-0.5 mg/3 mL nebulizer solution Take 3 mLs by nebulization every 6 (six) hours as needed for Shortness of Breath (For use when shortness of breath persists following inhaler usage). Rescue 360 mL 0     atorvastatin (LIPITOR) 80 MG tablet Take 1 tablet (80 mg total) by mouth once daily. 90 tablet 0     BREZTRI AEROSPHERE 160-9-4.8 mcg/actuation HFAA Inhale 2 puffs into the lungs 2 (two) times daily.       bumetanide (BUMEX) 1 MG tablet Take 1 tablet (1 mg total) by mouth daily as needed (Take every other day for heart failure. Take every day if needed for increasing fluid status.). 30 tablet 2     clopidogreL (PLAVIX) 75 mg tablet Take 1 tablet (75 mg total) by mouth once daily. 30 tablet 1     diphenhydrAMINE (BENADRYL) 25 mg capsule Take 1 capsule (25 mg total) by mouth every 6 (six) hours as needed for Itching. 30 capsule 0     folic acid (FOLVITE) 1 MG tablet Take 1 tablet (1 mg total) by mouth once daily. 30 tablet 11     gabapentin (NEURONTIN) 300 MG capsule Take 300 mg by mouth 3 (three) times daily.       olopatadine (PATANOL) 0.1 % ophthalmic solution Apply to eye.       pantoprazole (PROTONIX) 40 MG tablet Take 1 tablet (40 mg total) by mouth once daily. 90 tablet 3     potassium chloride SA (K-DUR,KLOR-CON) 20 MEQ tablet Take 1 tablet (20 mEq total) by mouth 2 (two) times daily. 60 tablet 1     predniSONE (DELTASONE) 5 MG tablet Take 4 tablets (20 mg total) by mouth 2 (two) times daily for 7 days, THEN 3 tablets (15 mg total) 2 (two) times daily for 7 days, THEN 2 tablets (10 mg total) 2 (two) times daily for 7 days, THEN 1 tablet (5 mg total) 2 (two) times daily for 7 days. 140 tablet 0     QUEtiapine (SEROQUEL) 100 MG Tab Take 1 tablet (100 mg total) by mouth every evening. 30 tablet 0     rivaroxaban (XARELTO) 20 mg Tab Take 1 tablet (20 mg total) by mouth Daily. 30 tablet 0     senna-docusate (PERICOLACE) 8.6-50 mg per tablet Take 1 tablet by mouth 2 (two) times daily as needed for  Constipation (Second line). 30 tablet 0     sertraline (ZOLOFT) 100 MG tablet Take 100 mg by mouth once daily.       sulfamethoxazole-trimethoprim 800-160mg (BACTRIM DS) 800-160 mg Tab Take 1 tablet by mouth 2 (two) times daily. 14 tablet 0     [Paused] urea (CARMOL) 40 % Crea Apply topically.       [Paused] varenicline tartrate (CHANTIX) 1 mg Tab Take 0.5 mg by mouth.       vitamin D (VITAMIN D3) 1000 units Tab Take 1,000 Units by mouth once daily.

## 2025-05-30 NOTE — PROGRESS NOTES
[facility] Progress Note     Resident Team: [facility] Medicine List 2  Attending: Merle Sun MD  Resident: Maco Bernard    Subjective:      Brief HPI:  Ran Reyes Jr. is a 69 y.o. 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 Of note, discharged from this facility on 5/20/25 after admission for cardiogenic/septic shock. Adrenal insufficiency noted during that admission; discharged on prednisone taper. Documented inability to tolerate GDMT. Discharged with life vest. According to documentation, was discharged with HH. Today, patient states he lives alone and family members have not been coming to help regularly. States he has not eaten since yesterday afternoon as no family member came to bring him food. He did not take home meds today. He is requesting nursing home placement as he is no longer able to care for himself at home. Denies chest pain, shortness of breath or palpitations. No fever, chills, nausea, vomiting, diarrhea, or urinary symptoms.     Interval History:   Nurse reports hypokalemia. He was given KCl IV and Oral. Patient not tolerate pain at IV site. D/c IV and continue KCl PO tid. Nurse also reports he urinated on himself. His mentality improved. He awake, alert and oriented. He answered year incorrectly, but did correctly after repeated question. Continue medications as scheduled. His breathing improved. No wheezing. BLE also improved. GI consulted for liver congestion and cholestatic picture. He denies headache, chest pain, shortness of breath, and abdominal pain. Working with PT.        Objective:     Last 24 Hour Vital Signs:  BP   Min: 108/72  Max: 115/72  Temp  Av.8 °F (36.6 °C)  Min: 97.3 °F (36.3 °C)  Max: 98.5 °F (36.9 °C)  Pulse  Av  Min: 51  Max: 75  Resp  Av.7  Min: 17  Max: 18  SpO2  Av.6 %  Min: 93 %  Max: 100 %  Weight  Av.5 kg (181 lb 14.1 oz)  Min: 82.4 kg (181 lb 10.5 oz)  Max: 82.6 kg (182 lb 1.6 oz)  I/O last 3 completed shifts:  In: 1212 [P.O.:1212]  Out: 1900 [Urine:1900]    Physical Examination:  Physical Exam  Vitals reviewed.   Constitutional:       General: He is not in acute distress.     Appearance: Normal appearance. He is not ill-appearing, toxic-appearing or diaphoretic.   Neck:      Comments: JVD present  Cardiovascular:      Rate and Rhythm: Normal rate.      Heart sounds: Murmur heard.   Pulmonary:      Effort: Pulmonary effort is normal. No respiratory distress.      Breath sounds: Normal breath sounds. No stridor. No wheezing, rhonchi or rales.   Abdominal:      General: Bowel sounds are normal. There is no distension.      Palpations: Abdomen is soft.      Tenderness: There is no abdominal tenderness. There is no guarding or rebound.   Musculoskeletal:      Right lower leg: Edema present.      Left lower leg: Edema present.      Comments: 2 + pitting edema on right and 1+ on left    Skin:     General: Skin is warm.   Neurological:      General: No focal deficit present.      Mental Status: He is alert and oriented to person, place, and time.       Laboratory:  Most Recent Data:  CBC:   Lab Results   Component Value Date    WBC 4.75 2025    HGB 8.0 (L) 2025    HCT 24.3 (L) 2025    PLT 70 (L) 2025    MCV 76.7 (L) 2025    RDW 26.8 (H) 2025     WBC Differential:   Recent Labs   Lab 25  0346 25  0351 25  0435 25  0422 25  0421   WBC 5.99 5.29 5.03 5.03 4.75   HGB 8.1* 8.1* 8.2* 7.8* 8.0*   HCT 25.1* 24.1* 24.8* 23.7* 24.3*   * 89* 77* 73* 70*   MCV 78.2* 76.0* 78.5* 76.9* 76.7*     BMP:   Lab Results   Component Value  Date     (L) 05/30/2025    K 2.6 (LL) 05/30/2025    CL 91 (L) 05/30/2025    CO2 28 05/30/2025    BUN 60.0 (H) 05/30/2025    CREATININE 1.92 (H) 05/30/2025     (H) 05/30/2025    CALCIUM 9.7 05/30/2025    MG 2.30 05/30/2025    PHOS 3.6 05/30/2025     LFTs:   Lab Results   Component Value Date    PROT 7.2 05/30/2025    ALBUMIN 3.4 05/30/2025    BILITOT 5.5 (H) 05/30/2025     (H) 05/30/2025    ALKPHOS 410 (H) 05/30/2025    ALT 88 (H) 05/30/2025     Coags:   Lab Results   Component Value Date    INR 3.4 (H) 05/28/2025    PROTIME 35.2 (H) 05/28/2025     FLP:   Lab Results   Component Value Date    CHOL 96 08/14/2024    HDL 32 (L) 08/14/2024    TRIG 73 08/14/2024     DM:   Lab Results   Component Value Date    HGBA1C 5.0 03/18/2024    HGBA1C 4.7 11/15/2021    HGBA1C 5.7 10/07/2020    CREATININE 1.92 (H) 05/30/2025     Thyroid:   Lab Results   Component Value Date    TSH 1.097 03/18/2024      Anemia:   Lab Results   Component Value Date    IRON 34 (L) 05/28/2025    TIBC 317 05/28/2025    FERRITIN 190.16 05/28/2025       Lab Results   Component Value Date    UYCZNEBH10 >2,000 (H) 04/22/2025       Lab Results   Component Value Date    FOLATE 18.5 04/22/2025        Cardiac:   Lab Results   Component Value Date    TROPONINI 0.048 (H) 05/24/2025    BNP 2,615.6 (H) 05/23/2025         Microbiology Data:  Microbiology Results (last 7 days)       Procedure Component Value Units Date/Time    Blood Culture #1 **CANNOT BE ORDERED STAT** [[id number]]  (Normal) Collected: 05/23/25 2127    Order Status: Completed Specimen: Blood from Hand, Right Updated: 05/29/25 1001     Blood Culture No Growth at 5 days    Blood Culture #2 **CANNOT BE ORDERED STAT** [[id number]]  (Normal) Collected: 05/23/25 2136    Order Status: Completed Specimen: Blood Updated: 05/29/25 1001     Blood Culture No Growth at 5 days    Urine culture [[id number]]  (Abnormal)  (Susceptibility) Collected: 05/23/25 1955    Order Status: Completed  Specimen: Urine Updated: 05/26/25 0823     Urine Culture >/= 100,000 colonies/ml Escherichia coli ESBL             Radiology:  Imaging Results              X-Ray Chest 1 View (Final result)  Result time 05/23/25 19:54:58      Final result by Ramana Mcleod MD (05/23/25 19:54:58)                   Narrative:    EXAMINATION  XR CHEST 1 VIEW    CLINICAL HISTORY  weakness;    TECHNIQUE  A total of 1 frontal image(s) submitted of the chest.    COMPARISON  4 May 2025    FINDINGS  Lines/tubes/devices: ECG leads overlie the imaged region.  Right PICC no longer visualized.    There is similar enlargement the cardiac silhouette, central vascular congestion, and widespread lung interstitial changes.  The trachea is midline. No new or worsening consolidation is developed in the interval.  There is no large pleural effusion or convincing pneumothorax.    Regional osseous structures and extrathoracic soft tissues are similar.    IMPRESSION  No acute process or other adverse interval change.  Chronic secondary findings discussed above.      Electronically signed by: Ramana Mcleod  Date:    05/23/2025  Time:    19:54                                    Current Medications:     Infusions:       Scheduled:   atorvastatin  80 mg Oral Daily    bumetanide  2 mg Intravenous BID    carvediloL  3.125 mg Oral BID    clopidogreL  75 mg Oral Daily    ferric gluconate (Ferrlecit) IVPB  125 mg Intravenous Daily    folic acid  1 mg Oral Daily    metOLazone  10 mg Oral Daily    multivitamin  1 tablet Oral Daily    nitrofurantoin (macrocrystal-monohydrate)  100 mg Oral Q12H    pantoprazole  40 mg Oral Daily    potassium chloride in water  10 mEq Intravenous Q1H    potassium chloride  40 mEq Oral BID    predniSONE  15 mg Oral BID    Followed by    [START ON 6/2/2025] predniSONE  10 mg Oral BID    Followed by    [START ON 6/9/2025] predniSONE  5 mg Oral BID    QUEtiapine  75 mg Oral QHS    rivaroxaban  15 mg Oral Daily with dinner    sertraline  75  mg Oral Daily    thiamine  500 mg Oral Daily    vitamin D  1,000 Units Oral Daily        PRN:    Current Facility-Administered Medications:     albuterol-ipratropium, 3 mL, Nebulization, Q4H PRN    dextrose 50%, 12.5 g, Intravenous, PRN    dextrose 50%, 25 g, Intravenous, PRN    glucagon (human recombinant), 1 mg, Intramuscular, PRN    glucose, 16 g, Oral, PRN    glucose, 24 g, Oral, PRN    insulin aspart U-100, 0-5 Units, Subcutaneous, QID (AC + HS) PRN    melatonin, 6 mg, Oral, Nightly PRN    naloxone, 0.02 mg, Intravenous, PRN    sodium chloride 0.9%, 10 mL, Intravenous, Q12H PRN      Assessment & Plan:     Encephalopathy, likely 2/2 hx of EtOH intake  - Per nurse, sister states he was a heavy alcohol drinker  - PETH pending  - Thiamine 500 mg PO daily and folate 1 mg PO daily    Microcytic anemia, likely 2/2 iron deficiency  - H/H (5/30/2025) 8/24.3  - MCV 76.7  - Iron profile(5/28/2025): Fe 34, UIBC 283, and Iron sat 11  - Ferrlecit 125 mg IV daily x 4 doses    Thrombocytopenia, likely 2/2 liver cirrhosis  Liver cirrhosis  - Platelet (5/30/2025) 70  - US abd (5/28/2025) showed hepatomegaly  - CT abd (5/3/2025) showed hepatic findings suggesting changes of cirrhosis  - Consulted GI for liver congestion    Hypokalemia  - K (5/30/2025) 2.6  - KCl 40 mEq PO tid  - Discontinue KCl IV d/t pain at IV site (can't dilute d/t volume overload status)    ELIZABETH  Hyponatremia  Anion gap metabolic acidosis  - BUN/Cr (5/30/2025) 60/1.92  - Suspect initial anion gap in setting of elevated lactic acid  - Hyponatremia potentially multifactorial with chronicity noted on prior admissions. Appears asymptomatic at this time.   - Potential contributions: hypervolemic hyponatremia d/t CHF vs hypovolemia-related d/t poor oral intake/diuretic use vs adrenal insufficiency  - Prerenal ELIZABETH possibly 2/2 poor intake  - Resume oral intake. S/p 1L LR in ED. Replete volume judiciously in setting of HFrEF.  - Continue electrolyte goals: K> 4 Mag >  3 Phos > 2     Troponinemia  Chronic persistent A-fib   Nonobstructive CAD (per 3/2024 [facility]), NICM  Acute on chronic systolic heart failure (HFrEF or HFmrEF)   HTN  pHTN  PVD   HLD  - Troponin 0.057. EKG with afib (rate 98) with PVCs; T wave inversions in 1 and aVL (noted on priors); ST abnormalities in V1 and V3 (noted on priors). No chest pain or dynamic EKG changes. F/U trops continued with downward trend  - BNP 2615 (down from 3765 on 5/13)  - CXR demonstrating cardiomegaly, central vascular congestion and chronic interstitial changes; on my read vascular congestion improved from recent hospitalization.   -TTE 4/2025: EF 22% with PASP 59 mm Hg and grade 3 diastolic dysfunction   - Continue home Bumex PO 1mg daily. Consider changing to IV and daily dosing if begins to retain more fluid.   - Daily weights, strict I/O  - Documented inability to tolerate GDMT at last admission.   - Continue Coreg 3.125 mg bid  - Continue Bumex 2 mg IV BID  - Continue anticoagulation for CVA risk reduction in setting of atrial fibrillation.    - Continue SAPT and high-intensity statin therapy  - Cardiac monitoring; defibrillator leads attached due to bedbug infestation of LifeVest wearable defibrillator    UTI   Difficulty urinating  - Afebrile, no leukocytosis, and asymptomatic  - Lactate 2.8; f/u trended down  - Urine cx grew ecoli ESBL. Upon chart review, patient received Rocephin x 2 doses. However, abx was discontinued as patient was asymptomatic.  - Patient c/o difficulty urinating this AM. Will do bladder scan. Macrobid initiated based on urine cx sensitivities. Consider escalating to Merem if symptoms not controlled.     Adrenal insufficiency  - Continue prednisone taper (initiated during recent hospitalization)     COPD  - Duonebs q4h PRN     Anxiety/insomnia   - Continue home Quetiapine 100 mg qhs and sertraline 100mg qd      CODE STATUS: Full Code  Access: Peripheral  Antibiotics: Macrobid  Diet: Heart healtyhy  DVT  Prophylaxis: Xarelto  GI Prophylaxis: None  Fluids: none    Disposition: day 6 of admission for deconditioning. Pending Nursing home placement. Patient also with severe CHF - on home Bumex 1mg Q every other day.       Roldan Cisneros MD  [facility], [facility], -  05/30/2025

## 2025-05-30 NOTE — PROGRESS NOTES
[facility]  Nephrology Progress Note    Patient Name: Ran Reyes Jr.  Age: 69 y.o.  : 1955  MRN: [id number]  Admission Date: 2025    Chief complaint: Fatigue and Leg Swelling (PT IN /AASI W REPORT OF WEAKNESS/SOB AND EDEMA TO LOWER LEGS. STATES HAS NOT EATEN X 18 HRS.  CBG EN ROUTE 137. 20G IV TO LT HAND /EMS.   PT FOUND TO HAVE BED BUGS /AASI. EKG TO BE OBTAINED. )      Hospital course  Ran Reyes Jr. is a 69 y.o. Black or  male with past medical history of HFrEF(22%), nonobstructive CAD, HTN, COPD, PVD, HLD, persistent atrial fibrillation on Xarelto who presented for generalized weakness, fatigue, and worsening bilateral LE edema on 25. He was recently discharged on 25 from an admission for cardiogenic shock noted to have adrenal insufficiency. Since admission he has been on PO Bumex 1 mg every other day with no improvement in his edema. His renal function has been gradually worsening and He has become more lethargic over the past few days. Nephrology was consulted for management of ELIZABETH.     Subjective  Patient denies any complaints.    Review of Systems   Cardiovascular:  Positive for leg swelling. Negative for chest pain.      Objective  /73   Pulse 61   Temp 97.8 °F (36.6 °C) (Oral)   Resp 18   Ht 6' (1.829 m)   Wt 82.6 kg (182 lb 1.6 oz)   SpO2 96%   BMI 24.70 kg/m²     Intake/Output Summary (Last 24 hours) at 2025 0842  Last data filed at 2025 0634  Gross per 24 hour   Intake 1182 ml   Output 2000 ml   Net -818 ml       Physical Exam  General appearance:Chronically ill appearing, alert today.  Skin: No rashes or wounds.  HEENT: PERRLA, EOMI, no scleral icterus,  JVD.  Right sided-supraclavicular soft tissue swelling without  Crepitus or erythema  Chest: Respirations are unlabored.    Heart: Irregular rhythm, systolic murmur.  Abdomen:  Distended.   : Deferred.  Extremities: BLE edema, peripheral pulses are  palpable.   Neuro: Patient is lethargic and disoriented to time, place and situation.     Medications  Scheduled Meds:   atorvastatin  80 mg Oral Daily    bumetanide  2 mg Intravenous BID    carvediloL  3.125 mg Oral BID    clopidogreL  75 mg Oral Daily    ferric gluconate (Ferrlecit) IVPB  125 mg Intravenous Daily    folic acid  1 mg Oral Daily    metOLazone  10 mg Oral Daily    multivitamin  1 tablet Oral Daily    nitrofurantoin (macrocrystal-monohydrate)  100 mg Oral Q12H    pantoprazole  40 mg Oral Daily    potassium chloride  40 mEq Oral TID    predniSONE  15 mg Oral BID    Followed by    [START ON 6/2/2025] predniSONE  10 mg Oral BID    Followed by    [START ON 6/9/2025] predniSONE  5 mg Oral BID    QUEtiapine  75 mg Oral QHS    rivaroxaban  15 mg Oral Daily with dinner    sertraline  75 mg Oral Daily    thiamine  500 mg Oral Daily    vitamin D  1,000 Units Oral Daily     Continuous Infusions:  PRN Meds:.  Current Facility-Administered Medications:     albuterol-ipratropium, 3 mL, Nebulization, Q4H PRN    dextrose 50%, 12.5 g, Intravenous, PRN    dextrose 50%, 25 g, Intravenous, PRN    glucagon (human recombinant), 1 mg, Intramuscular, PRN    glucose, 16 g, Oral, PRN    glucose, 24 g, Oral, PRN    insulin aspart U-100, 0-5 Units, Subcutaneous, QID (AC + HS) PRN    melatonin, 6 mg, Oral, Nightly PRN    naloxone, 0.02 mg, Intravenous, PRN    sodium chloride 0.9%, 10 mL, Intravenous, Q12H PRN     Imaging:    Reviewed    Laboratory Data:    Chemistry  Recent Labs     05/28/25  0435 05/29/25  0421 05/30/25  0421   * 134* 135*   K 5.1 3.8 2.6*   CO2 21* 22* 28   BUN 60.0* 63.8* 60.0*   CREATININE 1.86* 1.93* 1.92*   EGFRNORACEVR 39 37 38   CALCIUM 9.4 9.4 9.7   MG 2.50 2.50 2.30   PHOS 3.8 3.6 3.6      LFT  Lab Results   Component Value Date    ALKPHOS 410 (H) 05/30/2025     (H) 05/30/2025    ALT 88 (H) 05/30/2025    BILIDIR 3.7 (H) 05/28/2025    IBILI 2.10 (H) 05/28/2025    BILITOT 5.5 (H) 05/30/2025     ALBUMIN 3.4 05/30/2025      CKD-MBD  Lab Results   Component Value Date    PTH 60.0 03/18/2024    [id number] 37.3 10/07/2020      Hematology  Recent Labs     05/28/25  0435 05/29/25  0422 05/30/25  0421   WBC 5.03 5.03 4.75   HGB 8.2* 7.8* 8.0*   HCT 24.8* 23.7* 24.3*   PLT 77* 73* 70*      Lab Results   Component Value Date    IRON 34 (L) 05/28/2025    TIBC 317 05/28/2025    FERRITIN 190.16 05/28/2025    FOLATE 18.5 04/22/2025    [id number] >2,000 (H) 04/22/2025     (H) 03/18/2024      ID  Lab Results   Component Value Date    HIV Nonreactive 03/19/2024    HEPCAB Nonreactive 05/28/2025      Additional serology  Lab Results   Component Value Date    HGBA1C 5.0 03/18/2024       Urine studies  Lab Results   Component Value Date    APPEARANCEUA Turbid (A) 05/28/2025    SGUA 1.016 05/28/2025    PROTEINUA 2+ (A) 05/28/2025    KETONESUA Negative 05/28/2025    LEUKOCYTESUR 250 (A) 05/28/2025    RBCUA 0-5 05/28/2025    WBCUA 51-99 (A) 05/28/2025    BACTERIA Occasional (A) 05/28/2025    SQEPUA Occasional (A) 05/28/2025    HYALINECASTS None Seen 05/28/2025    CREATRANDUR 28.8 (L) 04/21/2025        Impression  Acute kidney injury, prerenal azotemia secondary to cardiorenal  Hypokalemia  Microcytic anemia  Thrombocytopenia  Atrial fibrillation  Adrenal insufficiency  HFrEF with severe mitral regurgitation    Plan  Patient responded well to diuresis, will continue diuresis with Bumex 2mg BID and metolazone daily, continue to monitor output.  Patient is hypokalemic agree with replacement, will recheck BMP later today. Patient is receiving Ferrlecit for iron deficiency, will start ZARA therapy weekly.   Caty Hughes NP  [facility] Nephrology   5/30/2025

## 2025-05-30 NOTE — PT/OT/SLP PROGRESS
Physical Therapy    Missed Treatment Session - patient unwilling to participate note - 05/30/2025    Patient Name:  Ran Reyes Jr.   MRN:  [id number]      Patient not seen at this time secondary to Patient unwilling to participate, Increased agitation (Pt agreed to get up for breakfast, started getting EOB, but then said he didnt agree to this and did not want to get up. Pt also aggitated he can not find dentures.).    Pt in room from [id number]    Will follow-up as patient is appropriate/available/agreeable to participate and as therapists' schedule allows.

## 2025-05-31 LAB
AFP-TM SERPL-MCNC: 4.8 NG/ML
ALBUMIN SERPL-MCNC: 3.2 G/DL (ref 3.4–4.8)
ALBUMIN SERPL-MCNC: 3.3 G/DL (ref 3.4–4.8)
ALBUMIN SERPL-MCNC: 3.4 G/DL (ref 3.4–4.8)
ALBUMIN/GLOB SERPL: 0.8 RATIO (ref 1.1–2)
ALP SERPL-CCNC: 422 UNIT/L (ref 40–150)
ALP SERPL-CCNC: 423 UNIT/L (ref 40–150)
ALP SERPL-CCNC: 426 UNIT/L (ref 40–150)
ALT SERPL-CCNC: 113 UNIT/L (ref 0–55)
ALT SERPL-CCNC: 117 UNIT/L (ref 0–55)
ALT SERPL-CCNC: 119 UNIT/L (ref 0–55)
ANION GAP SERPL CALC-SCNC: 16 MEQ/L
ANION GAP SERPL CALC-SCNC: 16 MEQ/L
ANION GAP SERPL CALC-SCNC: 19 MEQ/L
ANISOCYTOSIS BLD QL SMEAR: ABNORMAL
APTT PPP: 36.9 SECONDS (ref 23.2–33.7)
AST SERPL-CCNC: 140 UNIT/L (ref 11–45)
AST SERPL-CCNC: 142 UNIT/L (ref 11–45)
AST SERPL-CCNC: 148 UNIT/L (ref 11–45)
BASOPHILS # BLD AUTO: 0 X10(3)/MCL
BASOPHILS NFR BLD AUTO: 0 %
BILIRUB SERPL-MCNC: 5.3 MG/DL
BILIRUB SERPL-MCNC: 5.4 MG/DL
BILIRUB SERPL-MCNC: 5.5 MG/DL
BUN SERPL-MCNC: 64.1 MG/DL (ref 8.4–25.7)
BUN SERPL-MCNC: 66.8 MG/DL (ref 8.4–25.7)
BUN SERPL-MCNC: 68.2 MG/DL (ref 8.4–25.7)
BURR CELLS (OLG): SLIGHT
CALCIUM SERPL-MCNC: 9.7 MG/DL (ref 8.8–10)
CALCIUM SERPL-MCNC: 9.9 MG/DL (ref 8.8–10)
CALCIUM SERPL-MCNC: 9.9 MG/DL (ref 8.8–10)
CHLORIDE SERPL-SCNC: 89 MMOL/L (ref 98–107)
CHLORIDE SERPL-SCNC: 90 MMOL/L (ref 98–107)
CHLORIDE SERPL-SCNC: 90 MMOL/L (ref 98–107)
CO2 SERPL-SCNC: 27 MMOL/L (ref 23–31)
CO2 SERPL-SCNC: 31 MMOL/L (ref 23–31)
CO2 SERPL-SCNC: 32 MMOL/L (ref 23–31)
CREAT SERPL-MCNC: 1.95 MG/DL (ref 0.72–1.25)
CREAT SERPL-MCNC: 2.01 MG/DL (ref 0.72–1.25)
CREAT SERPL-MCNC: 2.05 MG/DL (ref 0.72–1.25)
CREAT/UREA NIT SERPL: 33
CREAT/UREA NIT SERPL: 33
CREAT/UREA NIT SERPL: 34
ELLIPTOCYTOSIS (OHS): SLIGHT
EOSINOPHIL # BLD AUTO: 0.03 X10(3)/MCL (ref 0–0.9)
EOSINOPHIL NFR BLD AUTO: 0.5 %
ERYTHROCYTE [DISTWIDTH] IN BLOOD BY AUTOMATED COUNT: 27.6 % (ref 11.5–17)
GFR SERPLBLD CREATININE-BSD FMLA CKD-EPI: 35 ML/MIN/1.73/M2
GFR SERPLBLD CREATININE-BSD FMLA CKD-EPI: 36 ML/MIN/1.73/M2
GFR SERPLBLD CREATININE-BSD FMLA CKD-EPI: 37 ML/MIN/1.73/M2
GLOBULIN SER-MCNC: 4 GM/DL (ref 2.4–3.5)
GLOBULIN SER-MCNC: 4.1 GM/DL (ref 2.4–3.5)
GLOBULIN SER-MCNC: 4.2 GM/DL (ref 2.4–3.5)
GLUCOSE SERPL-MCNC: 145 MG/DL (ref 82–115)
GLUCOSE SERPL-MCNC: 147 MG/DL (ref 82–115)
GLUCOSE SERPL-MCNC: 158 MG/DL (ref 82–115)
HCT VFR BLD AUTO: 26.6 % (ref 42–52)
HGB BLD-MCNC: 8.7 G/DL (ref 14–18)
HOLD SPECIMEN: NORMAL
IMM GRANULOCYTES # BLD AUTO: 0.04 X10(3)/MCL (ref 0–0.04)
IMM GRANULOCYTES NFR BLD AUTO: 0.7 %
INR PPP: 1.9
LYMPHOCYTES # BLD AUTO: 0.62 X10(3)/MCL (ref 0.6–4.6)
LYMPHOCYTES NFR BLD AUTO: 10.9 %
MACROCYTES BLD QL SMEAR: ABNORMAL
MAGNESIUM SERPL-MCNC: 2.2 MG/DL (ref 1.6–2.6)
MCH RBC QN AUTO: 25.5 PG (ref 27–31)
MCHC RBC AUTO-ENTMCNC: 32.7 G/DL (ref 33–36)
MCV RBC AUTO: 78 FL (ref 80–94)
MICROCYTES BLD QL SMEAR: ABNORMAL
MONOCYTES # BLD AUTO: 0.39 X10(3)/MCL (ref 0.1–1.3)
MONOCYTES NFR BLD AUTO: 6.9 %
NEUTROPHILS # BLD AUTO: 4.6 X10(3)/MCL (ref 2.1–9.2)
NEUTROPHILS NFR BLD AUTO: 81 %
NRBC BLD AUTO-RTO: 4.8 %
OVALOCYTES (OLG): ABNORMAL
PHOSPHATE SERPL-MCNC: 2.8 MG/DL (ref 2.3–4.7)
PLATELET # BLD AUTO: 70 X10(3)/MCL (ref 130–400)
PLATELET # BLD EST: ABNORMAL 10*3/UL
PLATELETS.RETICULATED NFR BLD AUTO: 6.5 % (ref 0.9–11.2)
PMV BLD AUTO: ABNORMAL FL
POIKILOCYTOSIS BLD QL SMEAR: ABNORMAL
POLYCHROMASIA BLD QL SMEAR: SLIGHT
POTASSIUM SERPL-SCNC: 2.9 MMOL/L (ref 3.5–5.1)
POTASSIUM SERPL-SCNC: 3.9 MMOL/L (ref 3.5–5.1)
POTASSIUM SERPL-SCNC: 3.9 MMOL/L (ref 3.5–5.1)
PROT SERPL-MCNC: 7.3 GM/DL (ref 5.8–7.6)
PROT SERPL-MCNC: 7.4 GM/DL (ref 5.8–7.6)
PROT SERPL-MCNC: 7.5 GM/DL (ref 5.8–7.6)
PROTHROMBIN TIME: 22.4 SECONDS (ref 11.4–14)
RBC # BLD AUTO: 3.41 X10(6)/MCL (ref 4.7–6.1)
RBC MORPH BLD: ABNORMAL
SCHISTOCYTE (OLG): SLIGHT
SODIUM SERPL-SCNC: 136 MMOL/L (ref 136–145)
SODIUM SERPL-SCNC: 137 MMOL/L (ref 136–145)
SODIUM SERPL-SCNC: 137 MMOL/L (ref 136–145)
SPHEROCYTES BLD QL SMEAR: SLIGHT
TARGETS BLD QL SMEAR: SLIGHT
WBC # BLD AUTO: 5.68 X10(3)/MCL (ref 4.5–11.5)

## 2025-05-31 PROCEDURE — 84484 ASSAY OF TROPONIN QUANT: CPT

## 2025-05-31 PROCEDURE — 80053 COMPREHEN METABOLIC PANEL: CPT

## 2025-05-31 PROCEDURE — 27000207 HC ISOLATION

## 2025-05-31 PROCEDURE — 85610 PROTHROMBIN TIME: CPT | Performed by: INTERNAL MEDICINE

## 2025-05-31 PROCEDURE — 84100 ASSAY OF PHOSPHORUS: CPT

## 2025-05-31 PROCEDURE — 99900035 HC TECH TIME PER 15 MIN (STAT)

## 2025-05-31 PROCEDURE — 25000003 PHARM REV CODE 250

## 2025-05-31 PROCEDURE — 63600175 PHARM REV CODE 636 W HCPCS

## 2025-05-31 PROCEDURE — 83735 ASSAY OF MAGNESIUM: CPT

## 2025-05-31 PROCEDURE — 85025 COMPLETE CBC W/AUTO DIFF WBC: CPT

## 2025-05-31 PROCEDURE — 85730 THROMBOPLASTIN TIME PARTIAL: CPT

## 2025-05-31 PROCEDURE — 36415 COLL VENOUS BLD VENIPUNCTURE: CPT

## 2025-05-31 PROCEDURE — 82105 ALPHA-FETOPROTEIN SERUM: CPT

## 2025-05-31 PROCEDURE — 94761 N-INVAS EAR/PLS OXIMETRY MLT: CPT

## 2025-05-31 PROCEDURE — 25000003 PHARM REV CODE 250: Performed by: NURSE PRACTITIONER

## 2025-05-31 PROCEDURE — 11000001 HC ACUTE MED/SURG PRIVATE ROOM

## 2025-05-31 RX ORDER — POTASSIUM CHLORIDE 20 MEQ/1
40 TABLET, EXTENDED RELEASE ORAL EVERY 4 HOURS
Status: COMPLETED | OUTPATIENT
Start: 2025-05-31 | End: 2025-05-31

## 2025-05-31 RX ADMIN — PANTOPRAZOLE SODIUM 40 MG: 40 TABLET, DELAYED RELEASE ORAL at 08:05

## 2025-05-31 RX ADMIN — POTASSIUM CHLORIDE 40 MEQ: 1500 TABLET, EXTENDED RELEASE ORAL at 10:05

## 2025-05-31 RX ADMIN — THIAMINE HCL TAB 100 MG 500 MG: 100 TAB at 08:05

## 2025-05-31 RX ADMIN — SERTRALINE HYDROCHLORIDE 75 MG: 50 TABLET ORAL at 08:05

## 2025-05-31 RX ADMIN — FOLIC ACID 1 MG: 1 TABLET ORAL at 08:05

## 2025-05-31 RX ADMIN — CARVEDILOL 3.12 MG: 3.12 TABLET, FILM COATED ORAL at 09:05

## 2025-05-31 RX ADMIN — ATORVASTATIN CALCIUM 80 MG: 40 TABLET, FILM COATED ORAL at 08:05

## 2025-05-31 RX ADMIN — PREDNISONE 15 MG: 5 TABLET ORAL at 09:05

## 2025-05-31 RX ADMIN — POTASSIUM CHLORIDE 40 MEQ: 1500 TABLET, EXTENDED RELEASE ORAL at 03:05

## 2025-05-31 RX ADMIN — Medication 1000 UNITS: at 08:05

## 2025-05-31 RX ADMIN — QUETIAPINE FUMARATE 75 MG: 25 TABLET ORAL at 09:05

## 2025-05-31 RX ADMIN — SODIUM CHLORIDE 125 MG: 9 INJECTION, SOLUTION INTRAVENOUS at 08:05

## 2025-05-31 RX ADMIN — POTASSIUM BICARBONATE 50 MEQ: 978 TABLET, EFFERVESCENT ORAL at 07:05

## 2025-05-31 RX ADMIN — CARVEDILOL 3.12 MG: 3.12 TABLET, FILM COATED ORAL at 08:05

## 2025-05-31 RX ADMIN — CLOPIDOGREL BISULFATE 75 MG: 75 TABLET, FILM COATED ORAL at 08:05

## 2025-05-31 RX ADMIN — THERA TABS 1 TABLET: TAB at 08:05

## 2025-05-31 RX ADMIN — PREDNISONE 15 MG: 5 TABLET ORAL at 08:05

## 2025-05-31 NOTE — PROGRESS NOTES
Nephrology Progress Note    Patient Name: Ran Reyes Jr.  Age: 69 y.o.  : 1955  MRN: [id number]  Admission Date: 2025    Chief complaint: Fatigue and Leg Swelling (PT IN /AASI W REPORT OF WEAKNESS/SOB AND EDEMA TO LOWER LEGS. STATES HAS NOT EATEN X 18 HRS.  CBG EN ROUTE 137. 20G IV TO LT HAND /EMS.   PT FOUND TO HAVE BED BUGS /AASI. EKG TO BE OBTAINED. )      Hospital course  Ran Reyes Jr. is a 69 y.o. Black or  male with past medical history of COPD, hypertension, pulmonary hypertension, dyslipidemia, atrial fibrillation, nonobstructive coronary artery disease, nonischemic cardiomyopathy with severely reduced ejection fraction (22%), peripheral vascular disease, remote history of Claudine's gangrene, glaucoma, and tobacco abuse.  Patient presented to the emergency department on 2025 with complaints of generalized weakness, fatigue, and lower extremity edema.  He was admitted to medicine service for treatment of CHF exacerbation, developed acute kidney injury and multiple electrolyte abnormalities for which Nephrology Service was consulted.    Subjective  Patient is resting in bed, appears comfortable.  Denies shortness of breath.      Review of Systems  Cardiovascular: Negative for chest pain   Respiratory: Negative for shortness of breath    Objective  BP 99/70   Pulse 61   Temp 97.9 °F (36.6 °C) (Oral)   Resp 17   Ht 6' (1.829 m)   Wt 84.8 kg (186 lb 15.2 oz)   SpO2 (!) 94%   BMI 25.35 kg/m²     Intake/Output Summary (Last 24 hours) at 2025 0735  Last data filed at 2025 0635  Gross per 24 hour   Intake 1425 ml   Output 1700 ml   Net -275 ml       Physical Exam  General appearance: Patient is in no acute distress.  Skin: No rashes or wounds.  HEENT: PERRLA, EOMI, no scleral icterus, no JVD. Neck is supple.  Chest: Respirations are unlabored. Lungs sounds are diminished to auscultation.   Heart: S1, S2.   Abdomen: Benign.  : Deferred.  Extremities:   Trace bilateral lower extremity edema, peripheral pulses are palpable.   Neuro: No focal deficits.     Medications  Scheduled Meds:   atorvastatin  80 mg Oral Daily    bumetanide  2 mg Intravenous BID    carvediloL  3.125 mg Oral BID    clopidogreL  75 mg Oral Daily    epoetin deep-ebpx (RETACRIT) injection  10,000 Units Subcutaneous Q7 Days    ferric gluconate (Ferrlecit) IVPB  125 mg Intravenous Daily    folic acid  1 mg Oral Daily    multivitamin  1 tablet Oral Daily    nitrofurantoin (macrocrystal-monohydrate)  100 mg Oral Q12H    pantoprazole  40 mg Oral Daily    potassium bicarbonate  25 mEq Oral Once    predniSONE  15 mg Oral BID    Followed by    [START ON 6/2/2025] predniSONE  10 mg Oral BID    Followed by    [START ON 6/9/2025] predniSONE  5 mg Oral BID    QUEtiapine  75 mg Oral QHS    sertraline  75 mg Oral Daily    thiamine  500 mg Oral Daily    vitamin D  1,000 Units Oral Daily     Continuous Infusions:  PRN Meds:.  Current Facility-Administered Medications:     albuterol-ipratropium, 3 mL, Nebulization, Q4H PRN    dextrose 50%, 12.5 g, Intravenous, PRN    dextrose 50%, 25 g, Intravenous, PRN    glucagon (human recombinant), 1 mg, Intramuscular, PRN    glucose, 16 g, Oral, PRN    glucose, 24 g, Oral, PRN    insulin aspart U-100, 0-5 Units, Subcutaneous, QID (AC + HS) PRN    melatonin, 6 mg, Oral, Nightly PRN    naloxone, 0.02 mg, Intravenous, PRN    sodium chloride 0.9%, 10 mL, Intravenous, Q12H PRN     Imaging:    Reviewed    Laboratory Data:    Chemistry  Recent Labs     05/29/25  0421 05/30/25  0421 05/30/25  1018 05/31/25  0329   * 135* 138 137   K 3.8 2.6* 2.8* 2.9*   CO2 22* 28 29 32*   BUN 63.8* 60.0* 61.0* 64.1*   CREATININE 1.93* 1.92* 1.94* 1.95*   EGFRNORACEVR 37 38 37 37   CALCIUM 9.4 9.7 9.7 9.9   MG 2.50 2.30  --  2.20   PHOS 3.6 3.6  --  2.8      LFT  Lab Results   Component Value Date    ALKPHOS 422 (H) 05/31/2025     (H) 05/31/2025     (H) 05/31/2025    BILIDIR 3.7  (H) 05/28/2025    IBILI 2.10 (H) 05/28/2025    BILITOT 5.5 (H) 05/31/2025    ALBUMIN 3.4 05/31/2025      CKD-MBD  Lab Results   Component Value Date    PTH 60.0 03/18/2024    [id number] 37.3 10/07/2020      Hematology  Recent Labs     05/29/25  0422 05/30/25  0421 05/31/25  0329   WBC 5.03 4.75 5.68   HGB 7.8* 8.0* 8.7*   HCT 23.7* 24.3* 26.6*   PLT 73* 70* 70*      Lab Results   Component Value Date    IRON 34 (L) 05/28/2025    TIBC 317 05/28/2025    FERRITIN 190.16 05/28/2025    FOLATE 18.5 04/22/2025    [id number] >2,000 (H) 04/22/2025     (H) 03/18/2024      ID  Lab Results   Component Value Date    HIV Nonreactive 03/19/2024    HEPCAB Nonreactive 05/28/2025      Additional serology  Lab Results   Component Value Date    HGBA1C 5.0 03/18/2024       Urine studies  Lab Results   Component Value Date    APPEARANCEUA Turbid (A) 05/28/2025    SGUA 1.016 05/28/2025    PROTEINUA 2+ (A) 05/28/2025    KETONESUA Negative 05/28/2025    LEUKOCYTESUR 250 (A) 05/28/2025    RBCUA 0-5 05/28/2025    WBCUA 51-99 (A) 05/28/2025    BACTERIA Occasional (A) 05/28/2025    SQEPUA Occasional (A) 05/28/2025    HYALINECASTS None Seen 05/28/2025    CREATRANDUR 28.8 (L) 04/21/2025        Impression  Acute kidney injury   Hypokalemia   Metabolic alkalosis  Cirrhosis of the liver   Thrombocytopenia   Anemia  Atrial fibrillation   Coronary artery disease  Heart failure with severely reduced ejection fraction   Pulmonary hypertension   Dyslipidemia   Peripheral vascular disease   COPD   Adrenal insufficiency   Alcohol/tobacco abuse    Plan  Acute kidney injury is likely a result of aggressive diuresis.  On exam, there is no significant edema, patient has no signs of respiratory distress.  Metabolic alkalosis has also worsened, this is likely secondary to volume contraction.  Will hold diuretics for now, continue potassium replacement, please utilize potassium chloride rather than potassium bicarbonate since patient has concurrent  metabolic alkalosis.      Samantha Phillips NP  Nephrology Service   5/31/2025

## 2025-05-31 NOTE — PROGRESS NOTES
[facility] Progress Note     Resident Team: [facility] Medicine List 2  Attending: Merle Sun MD  Resident: Maco Bernard    Subjective:      Brief HPI:  Ran Reyes Jr. is a 69 y.o. male with PMH significant for tobacco dependence, COPD, HTN, pHTN, HLD, chronic persistent atrial fibrillation on Xarelto, nonobstructive CAD, NICM, HFrEF (EF 22%), PVD s/p stenting to superficial femoral artery and right tibial artery, Claudine's gangrene (2024), primary open-angle glaucoma presented to [facility] ED on 2025 with a primary complaint of generalized weakness and fatigue and edema to BLE x 3 days. Of note, discharged from this facility on 25 after admission for cardiogenic/septic shock. Adrenal insufficiency noted during that admission; discharged on prednisone taper. Documented inability to tolerate GDMT. Discharged with life vest. According to documentation, was discharged with HH. Today, patient states he lives alone and family members have not been coming to help regularly. States he has not eaten since yesterday afternoon as no family member came to bring him food. He did not take home meds today. He is requesting nursing home placement as he is no longer able to care for himself at home. Denies chest pain, shortness of breath or palpitations. No fever, chills, nausea, vomiting, diarrhea, or urinary symptoms.     Interval History:   No acute events occurred overnight. Pt with one episode of bradycardia that was not sustained, all other vital signs remained stable. Pt has complaints of a new LUE tremor present at rest. No other acute complaints today.      Objective:     Last 24 Hour Vital Signs:  BP  Min: 99/65  Max: 111/75  Temp  Av.9 °F (36.6 °C)  Min: 97.7 °F (36.5 °C)  Max: 98 °F (36.7 °C)  Pulse  Av.8  Min: 44  Max: 76  Resp  Av.8  Min: 18  Max: 22  SpO2  Av.9 %  Min: 91 %  Max: 98 %  Weight  Av.8 kg (186 lb 15.2 oz)  Min: 84.8 kg (186 lb 15.2 oz)  Max: 84.8 kg (186 lb 15.2  oz)  I/O last 3 completed shifts:  In: 1778 [P.O.:1778]  Out: 2900 [Urine:2900]    Physical Examination:  Physical Exam  Vitals reviewed.   Constitutional:       General: He is not in acute distress.     Appearance: Normal appearance. He is not ill-appearing, toxic-appearing or diaphoretic.   Neck:      Comments: JVD present  Cardiovascular:      Rate and Rhythm: Normal rate.      Heart sounds: Murmur heard.   Pulmonary:      Effort: Pulmonary effort is normal. No respiratory distress.      Breath sounds: Normal breath sounds. No stridor. No wheezing, rhonchi or rales.   Abdominal:      General: Bowel sounds are normal. There is no distension.      Palpations: Abdomen is soft.      Tenderness: There is no abdominal tenderness. There is no guarding or rebound.   Musculoskeletal:      Right lower leg: Edema present.      Left lower leg: Edema present.      Comments: 2 + pitting edema on right and trace on left   Asterixis present   Skin:     General: Skin is warm.   Neurological:      General: No focal deficit present.      Mental Status: He is alert and oriented to person, place, and time.       Laboratory:  Most Recent Data:  CBC:   Lab Results   Component Value Date    WBC 5.68 05/31/2025    HGB 8.7 (L) 05/31/2025    HCT 26.6 (L) 05/31/2025    PLT 70 (L) 05/31/2025    MCV 78.0 (L) 05/31/2025    RDW 27.6 (H) 05/31/2025     BMP:   Lab Results   Component Value Date     05/31/2025    K 2.9 (L) 05/31/2025    CL 89 (L) 05/31/2025    CO2 32 (H) 05/31/2025    BUN 64.1 (H) 05/31/2025    CREATININE 1.95 (H) 05/31/2025     (H) 05/31/2025    CALCIUM 9.9 05/31/2025    MG 2.20 05/31/2025    PHOS 2.8 05/31/2025     LFTs:   Lab Results   Component Value Date    PROT 7.5 05/31/2025    ALBUMIN 3.4 05/31/2025    BILITOT 5.5 (H) 05/31/2025     (H) 05/31/2025    ALKPHOS 422 (H) 05/31/2025     (H) 05/31/2025     Coags:   Lab Results   Component Value Date    INR 1.9 (H) 05/31/2025    PROTIME 22.4 (H)  05/31/2025     FLP:   Lab Results   Component Value Date    CHOL 96 08/14/2024    HDL 32 (L) 08/14/2024    TRIG 73 08/14/2024     Anemia:   Lab Results   Component Value Date    IRON 34 (L) 05/28/2025    TIBC 317 05/28/2025    FERRITIN 190.16 05/28/2025       Lab Results   Component Value Date    [id number] >2,000 (H) 04/22/2025       Lab Results   Component Value Date    FOLATE 18.5 04/22/2025        Microbiology Data:  Microbiology Results (last 7 days)       Procedure Component Value Units Date/Time    Blood Culture #1 **CANNOT BE ORDERED STAT** [[id number]]  (Normal) Collected: 05/23/25 2127    Order Status: Completed Specimen: Blood from Hand, Right Updated: 05/29/25 1001     Blood Culture No Growth at 5 days    Blood Culture #2 **CANNOT BE ORDERED STAT** [[id number]]  (Normal) Collected: 05/23/25 2136    Order Status: Completed Specimen: Blood Updated: 05/29/25 1001     Blood Culture No Growth at 5 days    Urine culture [[id number]]  (Abnormal)  (Susceptibility) Collected: 05/23/25 1955    Order Status: Completed Specimen: Urine Updated: 05/26/25 0823     Urine Culture >/= 100,000 colonies/ml Escherichia coli ESBL             Current Medications:     Infusions:       Scheduled:   atorvastatin  80 mg Oral Daily    bumetanide  2 mg Intravenous BID    carvediloL  3.125 mg Oral BID    clopidogreL  75 mg Oral Daily    epoetin deep-ebpx (RETACRIT) injection  10,000 Units Subcutaneous Q7 Days    ferric gluconate (Ferrlecit) IVPB  125 mg Intravenous Daily    folic acid  1 mg Oral Daily    multivitamin  1 tablet Oral Daily    nitrofurantoin (macrocrystal-monohydrate)  100 mg Oral Q12H    pantoprazole  40 mg Oral Daily    potassium bicarbonate  25 mEq Oral Once    potassium bicarbonate  50 mEq Oral Once    predniSONE  15 mg Oral BID    Followed by    [START ON 6/2/2025] predniSONE  10 mg Oral BID    Followed by    [START ON 6/9/2025] predniSONE  5 mg Oral BID    QUEtiapine  75 mg Oral QHS    sertraline  75 mg Oral Daily     thiamine  500 mg Oral Daily    vitamin D  1,000 Units Oral Daily        PRN:    Current Facility-Administered Medications:     albuterol-ipratropium, 3 mL, Nebulization, Q4H PRN    dextrose 50%, 12.5 g, Intravenous, PRN    dextrose 50%, 25 g, Intravenous, PRN    glucagon (human recombinant), 1 mg, Intramuscular, PRN    glucose, 16 g, Oral, PRN    glucose, 24 g, Oral, PRN    insulin aspart U-100, 0-5 Units, Subcutaneous, QID (AC + HS) PRN    melatonin, 6 mg, Oral, Nightly PRN    naloxone, 0.02 mg, Intravenous, PRN    sodium chloride 0.9%, 10 mL, Intravenous, Q12H PRN      Assessment & Plan:     Encephalopathy, likely 2/2 hx of EtOH intake  Thrombocytopenia  Liver cirrhosis  Transaminitis  - Per nurse, sister states he is a heavy alcohol drinker  - PETH pending  - US abd (5/28/2025) showed hepatomegaly  - CT abd (5/3/2025) showed hepatic findings suggesting changes of cirrhosis  - Thiamine 500 mg PO daily and folate 1 mg PO daily  - GI consulted in setting of liver cirrhosis, appreciate their assistace    Microcytic anemia  Iron Deficiency  - H/H stable  - Iron profile(5/28/2025): Fe 34, UIBC 283, and Iron sat 11  - Ferrlecit 125 mg IV daily x 4 doses    ELIZABETH  Hyponatremia  Anion gap metabolic acidosis  Hypokalemia  - Suspect initial anion gap in setting of elevated lactic acid  - Hyponatremia potentially multifactorial with chronicity noted on prior admissions. Appears asymptomatic at this time.   - Potential contributions: hypervolemic hyponatremia d/t CHF vs hypovolemia-related d/t poor oral intake/diuretic use vs adrenal insufficiency  - Prerenal ELIZABETH possibly 2/2 poor intake  - Resume oral intake. S/p 1L LR in ED. Replete volume judiciously in setting of HFrEF.  - Unable to tolerate IV K replacement  - Hakeem 50meQ ordered  - Repeat BMP at 1000  - Continue electrolyte goals: K> 4 Mag > 3 Phos > 2     Chronic persistent A-fib   Nonobstructive CAD (per 3/[facility]24 [facility]), NICM  Acute on chronic systolic heart failure  (HFrEF or HFmrEF)   HTN  pHTN  PVD   HLD  Troponinemia (on admission)  - Troponin 0.057. EKG with afib (rate 98) with PVCs; T wave inversions in 1 and aVL (noted on priors); ST abnormalities in V1 and V3 (noted on priors). No chest pain or dynamic EKG changes. F/U trops continued with downward trend  - BNP 2615 (down from 3765 on 5/13)  - CXR demonstrating cardiomegaly, central vascular congestion and chronic interstitial changes; on my read vascular congestion improved from recent hospitalization.   -TTE 4/2025: EF 22% with PASP 59 mm Hg and grade 3 diastolic dysfunction   - Continue home Bumex PO 1mg daily.   - Daily weights, strict I/O  - Documented inability to tolerate GDMT at last admission.   - Continue Coreg 3.125 mg bid  - Continue Bumex 2 mg IV BID  - Continue anticoagulation for CVA risk reduction in setting of atrial fibrillation.    - Continue SAPT and high-intensity statin therapy  - Cardiac monitoring; due to bedbug infestation of LifeVest wearable defibrillator    UTI   Difficulty urinating  - Afebrile, no leukocytosis, and asymptomatic  - Lactate 2.8; f/u trended down  - Urine cx grew ecoli ESBL. Pt was on Macrobid x5 days. Will stop Abx at this time.   - Consider ID consult on Monday to assess for possible colonization     Adrenal insufficiency  - Continue prednisone taper (initiated during recent hospitalization)  -Work up completed at last admission     COPD  - Duonebs q4h PRN     Anxiety/insomnia   - Continue home Quetiapine 100 mg qhs and sertraline 100mg qd        CODE STATUS: Full Code  Access: Peripheral  Antibiotics: none  Diet: Heart healtyhy  DVT Prophylaxis: Xarelto  GI Prophylaxis: None  Fluids: none    Disposition: day 7 of admission for deconditioning. Pending Nursing home placement. Patient also with severe CHF - on home Bumex 1mg Q every other day. Consider ID consult for ESBL colonization, will stop Abx today.       Owen Dewey MD  [facility]

## 2025-05-31 NOTE — PLAN OF CARE
Problem: Heart Failure  Goal: Optimal Coping  Outcome: Progressing  Goal: Optimal Cardiac Output  Outcome: Progressing  Goal: Stable Heart Rate and Rhythm  Outcome: Progressing  Goal: Optimal Functional Ability  Outcome: Progressing  Goal: Fluid and Electrolyte Balance  Outcome: Progressing  Goal: Improved Oral Intake  Outcome: Progressing  Goal: Effective Oxygenation and Ventilation  Outcome: Progressing  Goal: Effective Breathing Pattern During Sleep  Outcome: Progressing     Problem: Skin Injury Risk Increased  Goal: Skin Health and Integrity  Outcome: Progressing     Problem: Comorbidity Management  Goal: Maintenance of COPD Symptom Control  Outcome: Progressing  Goal: Maintenance of Heart Failure Symptom Control  Outcome: Progressing  Goal: Blood Pressure in Desired Range  Outcome: Progressing     Problem: Infection  Goal: Absence of Infection Signs and Symptoms  Outcome: Progressing     Problem: Acute Kidney Injury/Impairment  Goal: Fluid and Electrolyte Balance  Outcome: Progressing  Goal: Improved Oral Intake  Outcome: Progressing  Goal: Effective Renal Function  Outcome: Progressing     Problem: Dysrhythmia  Goal: Normalized Cardiac Rhythm  Outcome: Progressing     Problem: Fall Injury Risk  Goal: Absence of Fall and Fall-Related Injury  Outcome: Progressing     Problem: Adult Inpatient Plan of Care  Goal: Plan of Care Review  Outcome: Progressing  Goal: Patient-Specific Goal (Individualized)  Outcome: Progressing  Goal: Absence of Hospital-Acquired Illness or Injury  Outcome: Progressing  Goal: Optimal Comfort and Wellbeing  Outcome: Progressing  Goal: Readiness for Transition of Care  Outcome: Progressing

## 2025-06-01 LAB
A-ADO2 BLOOD GAS (OHS): 147 MMHG
A-ADO2 BLOOD GAS (OHS): 51 MMHG
A-ADO2 BLOOD GAS (OHS): 583 MMHG
ALBUMIN SERPL-MCNC: 3 G/DL (ref 3.4–4.8)
ALBUMIN SERPL-MCNC: 3.2 G/DL (ref 3.4–4.8)
ALBUMIN/GLOB SERPL: 0.8 RATIO (ref 1.1–2)
ALBUMIN/GLOB SERPL: 0.8 RATIO (ref 1.1–2)
ALLENS TEST BLOOD GAS (OHS): YES
ALP SERPL-CCNC: 416 UNIT/L (ref 40–150)
ALP SERPL-CCNC: 423 UNIT/L (ref 40–150)
ALT SERPL-CCNC: 119 UNIT/L (ref 0–55)
ALT SERPL-CCNC: 126 UNIT/L (ref 0–55)
AMMONIA PLAS-MSCNC: 164.7 UMOL/L (ref 18–72)
AMMONIA PLAS-MSCNC: 57.7 UMOL/L (ref 18–72)
ANION GAP SERPL CALC-SCNC: 15 MEQ/L
ANION GAP SERPL CALC-SCNC: 16 MEQ/L
AST SERPL-CCNC: 126 UNIT/L (ref 11–45)
AST SERPL-CCNC: 145 UNIT/L (ref 11–45)
BASE EXCESS BLD CALC-SCNC: 17.3 MMOL/L (ref -2–2)
BASE EXCESS BLD CALC-SCNC: 18.1 MMOL/L (ref -2–2)
BASE EXCESS BLD CALC-SCNC: 18.1 MMOL/L (ref -2–2)
BASOPHILS # BLD AUTO: 0 X10(3)/MCL
BASOPHILS # BLD AUTO: 0.01 X10(3)/MCL
BASOPHILS NFR BLD AUTO: 0 %
BASOPHILS NFR BLD AUTO: 0.2 %
BILIRUB SERPL-MCNC: 5 MG/DL
BILIRUB SERPL-MCNC: 5.1 MG/DL
BLOOD GAS SAMPLE TYPE (OHS): ABNORMAL
BUN SERPL-MCNC: 73.6 MG/DL (ref 8.4–25.7)
BUN SERPL-MCNC: 73.8 MG/DL (ref 8.4–25.7)
CALCIUM SERPL-MCNC: 10 MG/DL (ref 8.8–10)
CALCIUM SERPL-MCNC: 9.6 MG/DL (ref 8.8–10)
CHLORIDE SERPL-SCNC: 90 MMOL/L (ref 98–107)
CHLORIDE SERPL-SCNC: 91 MMOL/L (ref 98–107)
CO2 BLDA-SCNC: 42.6 MMOL/L (ref 22–26)
CO2 BLDA-SCNC: 43.5 MMOL/L (ref 22–26)
CO2 BLDA-SCNC: 43.5 MMOL/L (ref 22–26)
CO2 SERPL-SCNC: 31 MMOL/L (ref 23–31)
CO2 SERPL-SCNC: 35 MMOL/L (ref 23–31)
COHGB MFR BLDA: 2.9 % (ref 0.5–1.5)
COHGB MFR BLDA: 3.4 % (ref 0.5–1.5)
COHGB MFR BLDA: 3.5 % (ref 0.5–1.5)
CREAT SERPL-MCNC: 2.02 MG/DL (ref 0.72–1.25)
CREAT SERPL-MCNC: 2.06 MG/DL (ref 0.72–1.25)
CREAT/UREA NIT SERPL: 36
CREAT/UREA NIT SERPL: 36
DRAWN BY BLOOD GAS (OHS): ABNORMAL
EOSINOPHIL # BLD AUTO: 0.01 X10(3)/MCL (ref 0–0.9)
EOSINOPHIL # BLD AUTO: 0.01 X10(3)/MCL (ref 0–0.9)
EOSINOPHIL NFR BLD AUTO: 0.2 %
EOSINOPHIL NFR BLD AUTO: 0.2 %
ERYTHROCYTE [DISTWIDTH] IN BLOOD BY AUTOMATED COUNT: 28.4 % (ref 11.5–17)
ERYTHROCYTE [DISTWIDTH] IN BLOOD BY AUTOMATED COUNT: 29.1 % (ref 11.5–17)
GAS PNL BLD: 200 MMHG
GAS PNL BLD: 656 MMHG
GAS PNL BLD: 92 MMHG
GFR SERPLBLD CREATININE-BSD FMLA CKD-EPI: 35 ML/MIN/1.73/M2
GFR SERPLBLD CREATININE-BSD FMLA CKD-EPI: 35 ML/MIN/1.73/M2
GLOBULIN SER-MCNC: 4 GM/DL (ref 2.4–3.5)
GLOBULIN SER-MCNC: 4.2 GM/DL (ref 2.4–3.5)
GLUCOSE SERPL-MCNC: 145 MG/DL (ref 82–115)
GLUCOSE SERPL-MCNC: 159 MG/DL (ref 82–115)
HCO3 BLDA-SCNC: 41.2 MMOL/L (ref 22–26)
HCO3 BLDA-SCNC: 42.1 MMOL/L (ref 22–26)
HCO3 BLDA-SCNC: 42.1 MMOL/L (ref 22–26)
HCT VFR BLD AUTO: 29.3 % (ref 42–52)
HCT VFR BLD AUTO: 30.7 % (ref 42–52)
HGB BLD-MCNC: 9.5 G/DL (ref 14–18)
HGB BLD-MCNC: 9.9 G/DL (ref 14–18)
HOLD SPECIMEN: NORMAL
IMM GRANULOCYTES # BLD AUTO: 0.03 X10(3)/MCL (ref 0–0.04)
IMM GRANULOCYTES # BLD AUTO: 0.03 X10(3)/MCL (ref 0–0.04)
IMM GRANULOCYTES NFR BLD AUTO: 0.5 %
IMM GRANULOCYTES NFR BLD AUTO: 0.5 %
INHALED O2 CONCENTRATION: 100 %
INHALED O2 CONCENTRATION: 21 %
INHALED O2 CONCENTRATION: 36 %
INR PPP: 1.2
LACTATE SERPL-SCNC: 1.9 MMOL/L (ref 0.5–2.2)
LACTATE SERPL-SCNC: 2.1 MMOL/L (ref 0.5–2.2)
LPM (OHS): 15
LPM (OHS): 4
LYMPHOCYTES # BLD AUTO: 0.58 X10(3)/MCL (ref 0.6–4.6)
LYMPHOCYTES # BLD AUTO: 0.94 X10(3)/MCL (ref 0.6–4.6)
LYMPHOCYTES NFR BLD AUTO: 14.8 %
LYMPHOCYTES NFR BLD AUTO: 9.7 %
MAGNESIUM SERPL-MCNC: 2 MG/DL (ref 1.6–2.6)
MAGNESIUM SERPL-MCNC: 2.3 MG/DL (ref 1.6–2.6)
MCH RBC QN AUTO: 25.3 PG (ref 27–31)
MCH RBC QN AUTO: 25.5 PG (ref 27–31)
MCHC RBC AUTO-ENTMCNC: 32.2 G/DL (ref 33–36)
MCHC RBC AUTO-ENTMCNC: 32.4 G/DL (ref 33–36)
MCV RBC AUTO: 77.9 FL (ref 80–94)
MCV RBC AUTO: 79.1 FL (ref 80–94)
METHGB MFR BLDA: 0.5 % (ref 0–1.5)
METHGB MFR BLDA: 0.8 % (ref 0–1.5)
METHGB MFR BLDA: 1.1 % (ref 0–1.5)
MONOCYTES # BLD AUTO: 0.37 X10(3)/MCL (ref 0.1–1.3)
MONOCYTES # BLD AUTO: 0.44 X10(3)/MCL (ref 0.1–1.3)
MONOCYTES NFR BLD AUTO: 6.2 %
MONOCYTES NFR BLD AUTO: 6.9 %
NEUTROPHILS # BLD AUTO: 4.92 X10(3)/MCL (ref 2.1–9.2)
NEUTROPHILS # BLD AUTO: 5.01 X10(3)/MCL (ref 2.1–9.2)
NEUTROPHILS NFR BLD AUTO: 77.6 %
NEUTROPHILS NFR BLD AUTO: 83.2 %
NRBC BLD AUTO-RTO: 5 %
NRBC BLD AUTO-RTO: 5.8 %
O2 HB BLOOD GAS (OHS): 78.7 % (ref 94–100)
O2 HB BLOOD GAS (OHS): 85 % (ref 94–100)
O2 HB BLOOD GAS (OHS): 92.7 % (ref 94–100)
OXYGEN DEVICE BLOOD GAS (OHS): ABNORMAL
OXYGEN DEVICE BLOOD GAS (OHS): ABNORMAL
OXYHGB MFR BLDA: 10.1 G/DL (ref 12–18)
OXYHGB MFR BLDA: 10.4 G/DL (ref 12–18)
OXYHGB MFR BLDA: 9.9 G/DL (ref 12–18)
PCO2 BLDA: 45 MMHG (ref 35–45)
PCO2 BLDA: 46 MMHG (ref 35–45)
PCO2 BLDA: 46 MMHG (ref 35–45)
PH BLDA: 7.57 [PH] (ref 7.35–7.45)
PHOSPHATE SERPL-MCNC: 2.3 MG/DL (ref 2.3–4.7)
PHOSPHATE SERPL-MCNC: 2.8 MG/DL (ref 2.3–4.7)
PLATELET # BLD AUTO: 57 X10(3)/MCL (ref 130–400)
PLATELET # BLD AUTO: 89 X10(3)/MCL (ref 130–400)
PLATELETS.RETICULATED NFR BLD AUTO: 7.8 % (ref 0.9–11.2)
PLPETH BLD-MCNC: <10 NG/ML
PMV BLD AUTO: ABNORMAL FL
PMV BLD AUTO: ABNORMAL FL
PO2 BLDA: 41 MMHG (ref 75–100)
PO2 BLDA: 53 MMHG (ref 75–100)
PO2 BLDA: 73 MMHG (ref 75–100)
POPETH BLD-MCNC: <10 NG/ML
POTASSIUM SERPL-SCNC: 3 MMOL/L (ref 3.5–5.1)
POTASSIUM SERPL-SCNC: 3.6 MMOL/L (ref 3.5–5.1)
PROT SERPL-MCNC: 7 GM/DL (ref 5.8–7.6)
PROT SERPL-MCNC: 7.4 GM/DL (ref 5.8–7.6)
PROTHROMBIN TIME: 14.9 SECONDS (ref 11.4–14)
RBC # BLD AUTO: 3.76 X10(6)/MCL (ref 4.7–6.1)
RBC # BLD AUTO: 3.88 X10(6)/MCL (ref 4.7–6.1)
SAMPLE SITE BLOOD GAS (OHS): ABNORMAL
SAO2 % BLDA: 82 %
SAO2 % BLDA: 88.7 %
SAO2 % BLDA: 96.7 %
SODIUM SERPL-SCNC: 137 MMOL/L (ref 136–145)
SODIUM SERPL-SCNC: 141 MMOL/L (ref 136–145)
T4 FREE SERPL-MCNC: 0.95 NG/DL (ref 0.7–1.48)
TOXICOLOGIST REVIEW: NORMAL
TROPONIN I SERPL-MCNC: 0.12 NG/ML (ref 0–0.04)
TSH SERPL-ACNC: 5.63 UIU/ML (ref 0.35–4.94)
WBC # BLD AUTO: 6.01 X10(3)/MCL (ref 4.5–11.5)
WBC # BLD AUTO: 6.34 X10(3)/MCL (ref 4.5–11.5)

## 2025-06-01 PROCEDURE — 36415 COLL VENOUS BLD VENIPUNCTURE: CPT

## 2025-06-01 PROCEDURE — 85025 COMPLETE CBC W/AUTO DIFF WBC: CPT

## 2025-06-01 PROCEDURE — 82803 BLOOD GASES ANY COMBINATION: CPT

## 2025-06-01 PROCEDURE — 63600175 PHARM REV CODE 636 W HCPCS: Mod: JZ,TB

## 2025-06-01 PROCEDURE — 83735 ASSAY OF MAGNESIUM: CPT

## 2025-06-01 PROCEDURE — 25000003 PHARM REV CODE 250

## 2025-06-01 PROCEDURE — 36600 WITHDRAWAL OF ARTERIAL BLOOD: CPT

## 2025-06-01 PROCEDURE — 84484 ASSAY OF TROPONIN QUANT: CPT

## 2025-06-01 PROCEDURE — 94640 AIRWAY INHALATION TREATMENT: CPT

## 2025-06-01 PROCEDURE — 63600175 PHARM REV CODE 636 W HCPCS

## 2025-06-01 PROCEDURE — 93005 ELECTROCARDIOGRAM TRACING: CPT

## 2025-06-01 PROCEDURE — 27000207 HC ISOLATION

## 2025-06-01 PROCEDURE — 25000003 PHARM REV CODE 250: Performed by: STUDENT IN AN ORGANIZED HEALTH CARE EDUCATION/TRAINING PROGRAM

## 2025-06-01 PROCEDURE — 83605 ASSAY OF LACTIC ACID: CPT

## 2025-06-01 PROCEDURE — 84100 ASSAY OF PHOSPHORUS: CPT

## 2025-06-01 PROCEDURE — 94761 N-INVAS EAR/PLS OXIMETRY MLT: CPT

## 2025-06-01 PROCEDURE — 27000221 HC OXYGEN, UP TO 24 HOURS

## 2025-06-01 PROCEDURE — 84443 ASSAY THYROID STIM HORMONE: CPT

## 2025-06-01 PROCEDURE — 20000000 HC ICU ROOM

## 2025-06-01 PROCEDURE — 25000242 PHARM REV CODE 250 ALT 637 W/ HCPCS

## 2025-06-01 PROCEDURE — 82140 ASSAY OF AMMONIA: CPT

## 2025-06-01 PROCEDURE — 85610 PROTHROMBIN TIME: CPT

## 2025-06-01 PROCEDURE — 84439 ASSAY OF FREE THYROXINE: CPT

## 2025-06-01 PROCEDURE — 80053 COMPREHEN METABOLIC PANEL: CPT

## 2025-06-01 PROCEDURE — 97530 THERAPEUTIC ACTIVITIES: CPT

## 2025-06-01 PROCEDURE — 94760 N-INVAS EAR/PLS OXIMETRY 1: CPT | Mod: XB

## 2025-06-01 PROCEDURE — 99900035 HC TECH TIME PER 15 MIN (STAT)

## 2025-06-01 RX ORDER — MUPIROCIN 20 MG/G
OINTMENT TOPICAL 2 TIMES DAILY
Status: DISCONTINUED | OUTPATIENT
Start: 2025-06-02 | End: 2025-06-05 | Stop reason: HOSPADM

## 2025-06-01 RX ORDER — QUETIAPINE FUMARATE 25 MG/1
50 TABLET, FILM COATED ORAL NIGHTLY
Status: DISCONTINUED | OUTPATIENT
Start: 2025-06-01 | End: 2025-06-01

## 2025-06-01 RX ORDER — QUETIAPINE FUMARATE 25 MG/1
25 TABLET, FILM COATED ORAL NIGHTLY
Status: DISCONTINUED | OUTPATIENT
Start: 2025-06-01 | End: 2025-06-05 | Stop reason: HOSPADM

## 2025-06-01 RX ORDER — SERTRALINE HYDROCHLORIDE 50 MG/1
50 TABLET, FILM COATED ORAL DAILY
Status: DISCONTINUED | OUTPATIENT
Start: 2025-06-02 | End: 2025-06-05 | Stop reason: HOSPADM

## 2025-06-01 RX ORDER — BUMETANIDE 0.25 MG/ML
2 INJECTION, SOLUTION INTRAMUSCULAR; INTRAVENOUS ONCE
Status: COMPLETED | OUTPATIENT
Start: 2025-06-01 | End: 2025-06-01

## 2025-06-01 RX ADMIN — CLOPIDOGREL BISULFATE 75 MG: 75 TABLET, FILM COATED ORAL at 09:06

## 2025-06-01 RX ADMIN — CARVEDILOL 3.12 MG: 3.12 TABLET, FILM COATED ORAL at 09:06

## 2025-06-01 RX ADMIN — BUMETANIDE 2 MG: 0.25 INJECTION INTRAMUSCULAR; INTRAVENOUS at 10:06

## 2025-06-01 RX ADMIN — LACTULOSE 30 G: 20 SOLUTION ORAL at 11:06

## 2025-06-01 RX ADMIN — BUMETANIDE 2 MG: 0.25 INJECTION INTRAMUSCULAR; INTRAVENOUS at 09:06

## 2025-06-01 RX ADMIN — THERA TABS 1 TABLET: TAB at 09:06

## 2025-06-01 RX ADMIN — PREDNISONE 15 MG: 5 TABLET ORAL at 09:06

## 2025-06-01 RX ADMIN — ATORVASTATIN CALCIUM 80 MG: 40 TABLET, FILM COATED ORAL at 09:06

## 2025-06-01 RX ADMIN — IPRATROPIUM BROMIDE AND ALBUTEROL SULFATE 3 ML: 2.5; .5 SOLUTION RESPIRATORY (INHALATION) at 10:06

## 2025-06-01 RX ADMIN — IPRATROPIUM BROMIDE AND ALBUTEROL SULFATE 3 ML: 2.5; .5 SOLUTION RESPIRATORY (INHALATION) at 07:06

## 2025-06-01 RX ADMIN — POTASSIUM BICARBONATE 50 MEQ: 978 TABLET, EFFERVESCENT ORAL at 09:06

## 2025-06-01 RX ADMIN — SERTRALINE HYDROCHLORIDE 75 MG: 50 TABLET ORAL at 09:06

## 2025-06-01 RX ADMIN — Medication 1000 UNITS: at 09:06

## 2025-06-01 RX ADMIN — FOLIC ACID 1 MG: 1 TABLET ORAL at 09:06

## 2025-06-01 RX ADMIN — SODIUM CHLORIDE 125 MG: 9 INJECTION, SOLUTION INTRAVENOUS at 09:06

## 2025-06-01 RX ADMIN — PANTOPRAZOLE SODIUM 40 MG: 40 TABLET, DELAYED RELEASE ORAL at 09:06

## 2025-06-01 RX ADMIN — QUETIAPINE FUMARATE 25 MG: 25 TABLET ORAL at 09:06

## 2025-06-01 RX ADMIN — THIAMINE HCL TAB 100 MG 500 MG: 100 TAB at 09:06

## 2025-06-01 NOTE — PROGRESS NOTES
Nephrology Progress Note    Patient Name: Ran Reyes Jr.  Age: 69 y.o.  : 1955  MRN: [id number]  Admission Date: 2025    Chief complaint: Fatigue and Leg Swelling (PT IN /[facility] W REPORT OF WEAKNESS/SOB AND EDEMA TO LOWER LEGS. STATES HAS NOT EATEN X 18 HRS.  CBG EN ROUTE 137. 20G IV TO LT HAND /EMS.   PT FOUND TO HAVE BED BUGS /[facility]. EKG TO BE OBTAINED. )      Hospital course  Ran Reyes Jr. is a 69 y.o. Black or  male with past medical history of COPD, hypertension, pulmonary hypertension, dyslipidemia, atrial fibrillation, nonobstructive coronary artery disease, nonischemic cardiomyopathy with severely reduced ejection fraction (22%), peripheral vascular disease, remote history of Claudine's gangrene, glaucoma, and tobacco abuse.  Patient presented to the emergency department on 2025 with complaints of generalized weakness, fatigue, and lower extremity edema.  He was admitted to medicine service for treatment of CHF exacerbation, developed acute kidney injury and multiple electrolyte abnormalities for which Nephrology Service was consulted.    Subjective  Patient is resting in bed, appears comfortable.  Denies shortness of breath.      Review of Systems  Cardiovascular: Negative for chest pain   Respiratory: Negative for shortness of breath    Objective  BP 98/67   Pulse 69   Temp 97.8 °F (36.6 °C) (Oral)   Resp 18   Ht 6' (1.829 m)   Wt 84.7 kg (186 lb 11.7 oz)   SpO2 (!) 94%   BMI 25.33 kg/m²     Intake/Output Summary (Last 24 hours) at 2025 1039  Last data filed at 2025 0509  Gross per 24 hour   Intake 1323.84 ml   Output 1150 ml   Net 173.84 ml       Physical Exam  General appearance: Patient is in no acute distress.  Skin: No rashes or wounds.  HEENT: PERRLA, EOMI, no scleral icterus, no JVD. Neck is supple.  Chest: Respirations are unlabored. Lungs sounds are diminished to auscultation.   Heart: S1, S2.   Abdomen: Benign.  :  Deferred.  Extremities:  Trace bilateral lower extremity edema, peripheral pulses are palpable.   Neuro: No focal deficits.     Medications  Scheduled Meds:   atorvastatin  80 mg Oral Daily    carvediloL  3.125 mg Oral BID    clopidogreL  75 mg Oral Daily    epoetin deep-ebpx (RETACRIT) injection  10,000 Units Subcutaneous Q7 Days    folic acid  1 mg Oral Daily    multivitamin  1 tablet Oral Daily    pantoprazole  40 mg Oral Daily    predniSONE  15 mg Oral BID    Followed by    [START ON 6/2/2025] predniSONE  10 mg Oral BID    Followed by    [START ON 6/9/2025] predniSONE  5 mg Oral BID    QUEtiapine  75 mg Oral QHS    sertraline  75 mg Oral Daily    thiamine  500 mg Oral Daily    vitamin D  1,000 Units Oral Daily     Continuous Infusions:  PRN Meds:.  Current Facility-Administered Medications:     albuterol-ipratropium, 3 mL, Nebulization, Q4H PRN    dextrose 50%, 12.5 g, Intravenous, PRN    dextrose 50%, 25 g, Intravenous, PRN    glucagon (human recombinant), 1 mg, Intramuscular, PRN    glucose, 16 g, Oral, PRN    glucose, 24 g, Oral, PRN    insulin aspart U-100, 0-5 Units, Subcutaneous, QID (AC + HS) PRN    melatonin, 6 mg, Oral, Nightly PRN    naloxone, 0.02 mg, Intravenous, PRN    sodium chloride 0.9%, 10 mL, Intravenous, Q12H PRN     Imaging:    Reviewed    Laboratory Data:    Chemistry  Recent Labs     05/30/25  0421 05/30/25  1018 05/31/25  0329 05/31/25  1018 05/31/25  1650 06/01/25  0531   * 138 137 136 137 137   K 2.6* 2.8* 2.9* 3.9 3.9 3.6   CO2 28 29 32* 27 31 31   BUN 60.0* 61.0* 64.1* 66.8* 68.2* 73.6*   CREATININE 1.92* 1.94* 1.95* 2.05* 2.01* 2.02*   EGFRNORACEVR 38 37 37 35 36 35   CALCIUM 9.7 9.7 9.9 9.7 9.9 10.0   MG 2.30  --  2.20  --   --  2.30   PHOS 3.6  --  2.8  --   --  2.3      LFT  Lab Results   Component Value Date    ALKPHOS 423 (H) 06/01/2025     (H) 06/01/2025     (H) 06/01/2025    BILIDIR 3.7 (H) 05/28/2025    IBILI 2.10 (H) 05/28/2025    BILITOT 5.1 (H)  06/01/2025    ALBUMIN 3.2 (L) 06/01/2025      CKD-MBD  Lab Results   Component Value Date    PTH 60.0 03/18/2024    [id number] 37.3 10/07/2020      Hematology  Recent Labs     05/30/25  0421 05/31/25  0329 06/01/25  0531   WBC 4.75 5.68 6.01   HGB 8.0* 8.7* 9.5*   HCT 24.3* 26.6* 29.3*   PLT 70* 70* 89*      Lab Results   Component Value Date    IRON 34 (L) 05/28/2025    TIBC 317 05/28/2025    FERRITIN 190.16 05/28/2025    FOLATE 18.5 04/22/2025    [id number] >2,000 (H) 04/22/2025     (H) 03/18/2024      ID  Lab Results   Component Value Date    HIV Nonreactive 03/19/2024    HEPCAB Nonreactive 05/28/2025      Additional serology  Lab Results   Component Value Date    HGBA1C 5.0 03/18/2024       Urine studies  Lab Results   Component Value Date    APPEARANCEUA Turbid (A) 05/28/2025    SGUA 1.016 05/28/2025    PROTEINUA 2+ (A) 05/28/2025    KETONESUA Negative 05/28/2025    LEUKOCYTESUR 250 (A) 05/28/2025    RBCUA 0-5 05/28/2025    WBCUA 51-99 (A) 05/28/2025    BACTERIA Occasional (A) 05/28/2025    SQEPUA Occasional (A) 05/28/2025    HYALINECASTS None Seen 05/28/2025    CREATRANDUR 28.8 (L) 04/21/2025        Impression  Acute kidney injury   Metabolic alkalosis  Cirrhosis of the liver   Thrombocytopenia   Anemia  Atrial fibrillation   Coronary artery disease  Heart failure with severely reduced ejection fraction   Pulmonary hypertension   Dyslipidemia   Peripheral vascular disease   COPD   Adrenal insufficiency   Alcohol/tobacco abuse    Plan  Acute kidney injury is likely a result of aggressive diuresis.  Metabolic alkalosis persists, this is likely secondary to volume contraction.  Continue as needed loop diuretics along with potassium replacement, please utilize potassium chloride rather than potassium bicarbonate since patient has concurrent metabolic alkalosis.      Samantha Phillips NP  Nephrology Service   6/1/2025

## 2025-06-01 NOTE — PLAN OF CARE
Problem: Adult Inpatient Plan of Care  Goal: Plan of Care Review  Outcome: Progressing  Goal: Patient-Specific Goal (Individualized)  Outcome: Progressing  Goal: Absence of Hospital-Acquired Illness or Injury  Outcome: Progressing  Goal: Optimal Comfort and Wellbeing  Outcome: Progressing  Goal: Readiness for Transition of Care  Outcome: Progressing     Problem: Heart Failure  Goal: Optimal Coping  Outcome: Progressing  Goal: Optimal Cardiac Output  Outcome: Progressing  Goal: Stable Heart Rate and Rhythm  Outcome: Progressing  Goal: Optimal Functional Ability  Outcome: Progressing  Goal: Fluid and Electrolyte Balance  Outcome: Progressing  Goal: Improved Oral Intake  Outcome: Progressing  Goal: Effective Oxygenation and Ventilation  Outcome: Progressing  Goal: Effective Breathing Pattern During Sleep  Outcome: Progressing     Problem: Fall Injury Risk  Goal: Absence of Fall and Fall-Related Injury  Outcome: Progressing     Problem: Acute Kidney Injury/Impairment  Goal: Fluid and Electrolyte Balance  Outcome: Progressing  Goal: Improved Oral Intake  Outcome: Progressing  Goal: Effective Renal Function  Outcome: Progressing     Problem: Dysrhythmia  Goal: Normalized Cardiac Rhythm  Outcome: Progressing

## 2025-06-01 NOTE — PLAN OF CARE
Problem: Heart Failure  Goal: Optimal Coping  Outcome: Progressing  Goal: Optimal Cardiac Output  Outcome: Progressing  Goal: Stable Heart Rate and Rhythm  Outcome: Progressing  Goal: Optimal Functional Ability  Outcome: Progressing  Goal: Fluid and Electrolyte Balance  Outcome: Progressing  Goal: Improved Oral Intake  Outcome: Progressing  Goal: Effective Oxygenation and Ventilation  Outcome: Progressing  Goal: Effective Breathing Pattern During Sleep  Outcome: Progressing     Problem: Skin Injury Risk Increased  Goal: Skin Health and Integrity  Outcome: Progressing     Problem: Comorbidity Management  Goal: Maintenance of COPD Symptom Control  Outcome: Progressing  Goal: Maintenance of Heart Failure Symptom Control  Outcome: Progressing  Goal: Blood Pressure in Desired Range  Outcome: Progressing     Problem: Infection  Goal: Absence of Infection Signs and Symptoms  Outcome: Progressing     Problem: Acute Kidney Injury/Impairment  Goal: Fluid and Electrolyte Balance  Outcome: Progressing  Goal: Improved Oral Intake  Outcome: Progressing  Goal: Effective Renal Function  Outcome: Progressing     Problem: Dysrhythmia  Goal: Normalized Cardiac Rhythm  Outcome: Progressing     Problem: Fall Injury Risk  Goal: Absence of Fall and Fall-Related Injury  Outcome: Progressing     Problem: Adult Inpatient Plan of Care  Goal: Plan of Care Review  Outcome: Progressing  Flowsheets (Taken 6/1/2025 5967)  Plan of Care Reviewed With: patient  Goal: Patient-Specific Goal (Individualized)  Outcome: Progressing  Goal: Absence of Hospital-Acquired Illness or Injury  Outcome: Progressing  Goal: Optimal Comfort and Wellbeing  Outcome: Progressing  Goal: Readiness for Transition of Care  Outcome: Progressing

## 2025-06-01 NOTE — PT/OT/SLP PROGRESS
Physical Therapy Treatment    Patient Name:  Ran Reyes Jr.   MRN:  [id number]    Recommendations     Therapy Intensity Recommendations at Discharge: Moderate Intensity Therapy  Discharge Equipment Recommendations: to be determined by next level of care   Barriers to discharge: fall risk, severity of deficits, level of skilled assistance required, decreased endurance, limited family support, and complicated medical history    Assessment     Ran Reyes Jr. is a 69 y.o. male admitted with a medical diagnosis of  :  1. Chronic congestive heart failure, unspecified heart failure type    2. Liver cirrhosis    3. Congestive heart failure, unspecified HF chronicity, unspecified heart failure type    4. Weakness    5. Longstanding persistent atrial fibrillation    6. ELIZABETH (acute kidney injury)    7. Acute cystitis without hematuria    8. Unable to care for self    9. Chest pain    10. Elevated troponin    11. Arteriosclerosis of coronary artery    12. Hepatic cirrhosis, unspecified hepatic cirrhosis type, unspecified whether ascites present    13. Anemia in chronic kidney disease, unspecified CKD stage    14. Hypokalemia    15. Hyponatremia    16. Tobacco dependency       Problem List[1]   He presents with the following impairments/functional limitations:  weakness, impaired endurance, impaired functional mobility, decreased upper extremity function, decreased lower extremity function, edema, impaired cardiopulmonary response to activity.    Rehab Prognosis: Fair.    Patient would benefit from continued skilled acute PT services to: address above listed impairments/functional limitations; receive patient/caregiver education; reduce fall risk; and maximize independency/safety with functional mobility.    Recent Surgery: * No surgery found *      Plan     During this hospitalization, patient to be seen 5 x/week to address the identified impairments/functional limitations via therapeutic activities, therapeutic  exercises, gait training, wheelchair management/training and progress toward the established goals.    Plan of Care Expires:  06/23/25    Subjective     Communicated with patient's nurse Rose prior to session.    Patient agreeable to participate in treatment session.    Chief Complaint: Fatigue  Patient/Family Comments/goals: Get better  Pain/Comfort:  Pain Rating 1: 0/10  Pain Rating Post-Intervention 1: 0/10    Objective     Patient found with HOB elevated with PureWick, pulse ox (continuous), oxygen  upon PT entry to room.    General Precautions: Standard, fall, contact   Orthopedic Precautions:N/A   Braces:  N/A  Respiratory Status: 2 liters/min O2 via nasal cannula    Functional Mobility:    Bed Mobility:  Scooting: maximal assistance  Supine to Sit: moderate assistance  Sit to Supine: moderate assistance  Extra time needed, using HOB elevated and bed rails    Other Mobility:  Therapeutic Activities performed:        -sitting balance activities:              weight shifting:                   -forward                 -laterally (left)                 -laterally (right)            reaching:                   -forward  - all for eating lunch, but pt would become fatigue and rest head against bed then sit back up. With fatigue pt was unable to get the fork to his mouth so PT assisted back to bed and placed bed in chair position. Pt tolerated sitting EOB ~ 10 min, but with rest breaks taken , leaning against elevated HOB.     O2 saturation ranged from 75-90% during eating EOB tasks.     Patient left in chair position in bed with all lines intact, call button in reach, tray table at bedside, patient's nurse notified, and bed alarm on.    Education     Patient educated on and assisted with functional mobility as noted above.  Patient educated on PT Plan of Care.  Patient was instructed to utilize staff assistance for mobility/transfers.  White board updated regarding patient's safest level of mobility with staff  assistance    Goals     Multidisciplinary Problems       Physical Therapy Goals          Problem: Physical Therapy    Goal Priority Disciplines Outcome Interventions   Physical Therapy Goal     PT, PT/OT Progressing    Description: Goals to be met by: Discharge      Patient will increase functional independence with mobility by performin. Sit to stand transfer with Supervision  2. Bed to chair transfer with Supervision using Rolling Walker  3. Gait  x 130 feet with Stand-by Assistance using Rolling Walker vs Rollator.   4. Increased functional strength to 4/5 for B LE.  Reviewed 2025                         Time Tracking     PT Received On: 25  PT Start Time: 1219     PT Stop Time: 1236  PT Total Time (min): 17 min     Billable Minutes: Therapeutic Activity 15    2025       [1]   Patient Active Problem List  Diagnosis    Primary open angle glaucoma (POAG) of right eye, moderate stage    Combined forms of age-related cataract of left eye    Arteriosclerosis of coronary artery    Chronic atrial fibrillation    Dyslipidemia    Hypertension    Tobacco use    PVD (peripheral vascular disease)    VHD (valvular heart disease)    COVID-19    HFrEF (heart failure with reduced ejection fraction)    Nonrheumatic mitral valve regurgitation    Postoperative eye state    Scrotal hematoma    Chronic obstructive pulmonary disease, unspecified    Lesion of external ear    Low back pain    Other thrombophilia    Secondary hyperaldosteronism    Positive colorectal cancer screening using Cologuard test    Acute heart failure    History of COPD    CHF (congestive heart failure)    Acute cystitis with hematuria    Tobacco dependency    Liver cirrhosis    Hypokalemia    Hyponatremia    Unable to care for self

## 2025-06-01 NOTE — PROGRESS NOTES
[facility] Progress Note     Resident Team: [facility] Medicine List 2  Attending: Merle Sun MD  Resident: Maco Bernard    Subjective:      Brief HPI:  Ran Reyes Jr. is a 69 y.o. male with PMH significant for tobacco dependence, COPD, HTN, pHTN, HLD, chronic persistent atrial fibrillation on Xarelto, nonobstructive CAD, NICM, HFrEF (EF 22%), PVD s/p stenting to superficial femoral artery and right tibial artery, Claudine's gangrene (2024), primary open-angle glaucoma presented to [facility] ED on 2025 with a primary complaint of generalized weakness and fatigue and edema to BLE x 3 days. Of note, discharged from this facility on 25 after admission for cardiogenic/septic shock. Adrenal insufficiency noted during that admission; discharged on prednisone taper. Documented inability to tolerate GDMT. Discharged with life vest. According to documentation, was discharged with HH. Today, patient states he lives alone and family members have not been coming to help regularly. States he has not eaten since yesterday afternoon as no family member came to bring him food. He did not take home meds today. He is requesting nursing home placement as he is no longer able to care for himself at home. Denies chest pain, shortness of breath or palpitations. No fever, chills, nausea, vomiting, diarrhea, or urinary symptoms.     Interval History:   No acute events occurred overnight. Pt disoriented. Pt has B/L pitting edema from thighs to ankles. Resumed Bumex 2 mg IV. Ammonia and Lactic acid normal. ABG showed PO2 41. Pt on 2L NC. Reduced quetiapine and sertraline to 50 mg.      Objective:     Last 24 Hour Vital Signs:  BP  Min: 93/66  Max: 117/70  Temp  Av.8 °F (36.6 °C)  Min: 97.5 °F (36.4 °C)  Max: 98.5 °F (36.9 °C)  Pulse  Av.6  Min: 51  Max: 81  Resp  Av.9  Min: 16  Max: 20  SpO2  Av.3 %  Min: 90 %  Max: 97 %  Weight  Av.7 kg (186 lb 11.7 oz)  Min: 84.7 kg (186 lb 11.7 oz)  Max: 84.7 kg (186 lb  11.7 oz)  I/O last 3 completed shifts:  In: 2154.8 [P.O.:1867; IV Piggyback:287.8]  Out: 2250 [Urine:2250]    Physical Examination:  Physical Exam  Vitals reviewed.   Constitutional:       General: He is not in acute distress.     Appearance: Normal appearance. He is not ill-appearing, toxic-appearing or diaphoretic.   Neck:      Vascular: No carotid bruit.      Comments: JVD present  Cardiovascular:      Rate and Rhythm: Normal rate.      Heart sounds: Murmur heard.   Pulmonary:      Effort: Pulmonary effort is normal. No respiratory distress.      Breath sounds: Normal breath sounds. No stridor. No wheezing, rhonchi or rales.   Abdominal:      General: Bowel sounds are normal. There is no distension.      Palpations: Abdomen is soft.      Tenderness: There is no abdominal tenderness. There is no guarding or rebound.   Musculoskeletal:      Right lower leg: Edema present.      Left lower leg: Edema present.      Comments: B/L pitting edema from thighs to ankles  Asterixis present   Skin:     General: Skin is warm.   Neurological:      General: No focal deficit present.      Mental Status: He is alert and oriented to person, place, and time.       Laboratory:  Most Recent Data:  CBC:   Lab Results   Component Value Date    WBC 6.01 06/01/2025    HGB 9.5 (L) 06/01/2025    HCT 29.3 (L) 06/01/2025    PLT 89 (L) 06/01/2025    MCV 77.9 (L) 06/01/2025    RDW 28.4 (H) 06/01/2025     BMP:   Lab Results   Component Value Date     06/01/2025    K 3.6 06/01/2025    CL 91 (L) 06/01/2025    CO2 31 06/01/2025    BUN 73.6 (H) 06/01/2025    CREATININE 2.02 (H) 06/01/2025     (H) 06/01/2025    CALCIUM 10.0 06/01/2025    MG 2.30 06/01/2025    PHOS 2.3 06/01/2025     LFTs:   Lab Results   Component Value Date    PROT 7.4 06/01/2025    ALBUMIN 3.2 (L) 06/01/2025    BILITOT 5.1 (H) 06/01/2025     (H) 06/01/2025    ALKPHOS 423 (H) 06/01/2025     (H) 06/01/2025     Coags:   Lab Results   Component Value Date     INR 1.9 (H) 05/31/2025    PROTIME 22.4 (H) 05/31/2025     FLP:   Lab Results   Component Value Date    CHOL 96 08/14/2024    HDL 32 (L) 08/14/2024    TRIG 73 08/14/2024     Anemia:   Lab Results   Component Value Date    IRON 34 (L) 05/28/2025    TIBC 317 05/28/2025    FERRITIN 190.16 05/28/2025       Lab Results   Component Value Date    MRQKPRSL55 >2,000 (H) 04/22/2025       Lab Results   Component Value Date    FOLATE 18.5 04/22/2025        Microbiology Data:  Microbiology Results (last 7 days)       Procedure Component Value Units Date/Time    Blood Culture #1 **CANNOT BE ORDERED STAT** [[id number]]  (Normal) Collected: 05/23/25 2127    Order Status: Completed Specimen: Blood from Hand, Right Updated: 05/29/25 1001     Blood Culture No Growth at 5 days    Blood Culture #2 **CANNOT BE ORDERED STAT** [[id number]]  (Normal) Collected: 05/23/25 2136    Order Status: Completed Specimen: Blood Updated: 05/29/25 1001     Blood Culture No Growth at 5 days    Urine culture [[id number]]  (Abnormal)  (Susceptibility) Collected: 05/23/25 1955    Order Status: Completed Specimen: Urine Updated: 05/26/25 0823     Urine Culture >/= 100,000 colonies/ml Escherichia coli ESBL             Current Medications:     Infusions:       Scheduled:   atorvastatin  80 mg Oral Daily    carvediloL  3.125 mg Oral BID    clopidogreL  75 mg Oral Daily    epoetin deep-ebpx (RETACRIT) injection  10,000 Units Subcutaneous Q7 Days    ferric gluconate (Ferrlecit) IVPB  125 mg Intravenous Daily    folic acid  1 mg Oral Daily    multivitamin  1 tablet Oral Daily    pantoprazole  40 mg Oral Daily    predniSONE  15 mg Oral BID    Followed by    [START ON 6/2/2025] predniSONE  10 mg Oral BID    Followed by    [START ON 6/9/2025] predniSONE  5 mg Oral BID    QUEtiapine  75 mg Oral QHS    sertraline  75 mg Oral Daily    thiamine  500 mg Oral Daily    vitamin D  1,000 Units Oral Daily        PRN:    Current Facility-Administered Medications:      albuterol-ipratropium, 3 mL, Nebulization, Q4H PRN    dextrose 50%, 12.5 g, Intravenous, PRN    dextrose 50%, 25 g, Intravenous, PRN    glucagon (human recombinant), 1 mg, Intramuscular, PRN    glucose, 16 g, Oral, PRN    glucose, 24 g, Oral, PRN    insulin aspart U-100, 0-5 Units, Subcutaneous, QID (AC + HS) PRN    melatonin, 6 mg, Oral, Nightly PRN    naloxone, 0.02 mg, Intravenous, PRN    sodium chloride 0.9%, 10 mL, Intravenous, Q12H PRN      Assessment & Plan:     Encephalopathy, likely 2/2 hx of EtOH intake  Thrombocytopenia  Liver cirrhosis  Transaminitis  Per nurse, sister states he is a heavy alcohol drinker  PETH pending  US abd (5/28/2025) showed hepatomegaly  CT abd (5/3/2025) showed hepatic findings suggesting changes of cirrhosis  Thiamine 500 mg PO daily and folate 1 mg PO daily  GI consulted in setting of liver cirrhosis, appreciate their assistace    Microcytic anemia  Iron Deficiency  H/H stable  Iron profile(5/28/2025): Fe 34, UIBC 283, and Iron sat 11  Ferrlecit 125 mg IV daily x 4 doses    ELIZABETH  Hyponatremia  Anion gap metabolic acidosis  Hypokalemia  Suspect initial anion gap in setting of elevated lactic acid  Hyponatremia potentially multifactorial with chronicity noted on prior admissions. Appears asymptomatic at this time.   Potential contributions: hypervolemic hyponatremia d/t CHF vs hypovolemia-related d/t poor oral intake/diuretic use vs adrenal insufficiency  Prerenal ELIZABETH possibly 2/2 poor intake  Resume oral intake. S/p 1L LR in ED. Replete volume judiciously in setting of HFrEF.  Continue electrolyte goals: K> 4 Mag > 3 Phos > 2     Chronic persistent A-fib   Nonobstructive CAD (per 3/2024 [facility]), NICM  Acute on chronic systolic heart failure (HFrEF or HFmrEF)   HTN  pHTN  PVD   HLD  Troponinemia (on admission)  Troponin 0.057. EKG with afib (rate 98) with PVCs; T wave inversions in 1 and aVL (noted on priors); ST abnormalities in V1 and V3 (noted on priors). No chest pain or dynamic  EKG changes. F/U trops continued with downward trend  BNP 2615 (down from 3765 on 5/13)  CXR demonstrating cardiomegaly, central vascular congestion and chronic interstitial changes; on my read vascular congestion improved from recent hospitalization.   TTE 4/2025: EF 22% with PASP 59 mm Hg and grade 3 diastolic dysfunction   Continue home Bumex PO 1mg daily.   Daily weights, strict I/O  Documented inability to tolerate GDMT at last admission.   Continue Coreg 3.125 mg bid  Resumed Bumex 2 mg IV once today  Continue anticoagulation for CVA risk reduction in setting of atrial fibrillation.    Continue SAPT and high-intensity statin therapy  Cardiac monitoring; due to bedbug infestation of LifeVest wearable defibrillator    UTI   Difficulty urinating  Afebrile, no leukocytosis, and asymptomatic  Lactate 2.8; f/u trended down  Urine cx grew ecoli ESBL. Pt was on Macrobid x5 days. Will stop Abx at this time.   Consider ID consult on Monday to assess for possible colonization     Adrenal insufficiency  Continue prednisone taper (initiated during recent hospitalization)  Work up completed at last admission     COPD  Duo-Nebs q4h PRN     Anxiety/insomnia   Continue home Quetiapine 50 mg qhs and sertraline 50 mg qd        CODE STATUS: Full Code  Access: Peripheral  Antibiotics: none  Diet: Heart healtyhy  DVT Prophylaxis: Xarelto  GI Prophylaxis: None  Fluids: none    Disposition: day 8 of admission for deconditioning. Pending Nursing home placement. Patient also with severe CHF - on home Bumex 1mg Q every other day. Consider ID consult for ESBL colonization, will stop Abx today.     Roldan Cisneros MD  [facility], -  06/01/2025

## 2025-06-01 NOTE — PLAN OF CARE
Problem: Adult Inpatient Plan of Care  Goal: Plan of Care Review  6/1/2025 0709 by Sonja Mast LPN  Outcome: Progressing  6/1/2025 0708 by Sonja Mast LPN  Outcome: Progressing  Goal: Patient-Specific Goal (Individualized)  6/1/2025 0709 by Sonja Mast LPN  Outcome: Progressing  6/1/2025 0708 by Sonja Mast LPN  Outcome: Progressing  Goal: Absence of Hospital-Acquired Illness or Injury  6/1/2025 0709 by Sonja Mast LPN  Outcome: Progressing  6/1/2025 0708 by Sonja Mast LPN  Outcome: Progressing  Goal: Optimal Comfort and Wellbeing  6/1/2025 0709 by Sonja Mast LPN  Outcome: Progressing  6/1/2025 0708 by Sonja Mast LPN  Outcome: Progressing  Goal: Readiness for Transition of Care  6/1/2025 0709 by Sonja Mast LPN  Outcome: Progressing  6/1/2025 0708 by Sonja Mast LPN  Outcome: Progressing     Problem: Heart Failure  Goal: Optimal Coping  6/1/2025 0709 by Sonja Mast LPN  Outcome: Progressing  6/1/2025 0708 by Sonja Mast LPN  Outcome: Progressing  Goal: Optimal Cardiac Output  6/1/2025 0709 by Sonja Mast LPN  Outcome: Progressing  6/1/2025 0708 by Sonja Mast LPN  Outcome: Progressing  Goal: Stable Heart Rate and Rhythm  6/1/2025 0709 by Sonja Mast LPN  Outcome: Progressing  6/1/2025 0708 by Sonja Mast LPN  Outcome: Progressing  Goal: Optimal Functional Ability  6/1/2025 0709 by Sonja Mast LPN  Outcome: Progressing  6/1/2025 0708 by Sonja Mast LPN  Outcome: Progressing  Goal: Fluid and Electrolyte Balance  6/1/2025 0709 by Sonja Mast LPN  Outcome: Progressing  6/1/2025 0708 by Sonja Mast LPN  Outcome: Progressing  Goal: Improved Oral Intake  6/1/2025 0709 by Sonja Mast LPN  Outcome: Progressing  6/1/2025 0708 by Sonja Mast LPN  Outcome: Progressing  Goal: Effective Oxygenation and Ventilation  6/1/2025 0709 by Sonja Mast LPN  Outcome: Progressing  6/1/2025 0708 by Sonja Mast,  LPN  Outcome: Progressing  Goal: Effective Breathing Pattern During Sleep  6/1/2025 0709 by Sonja Mast LPN  Outcome: Progressing  6/1/2025 0708 by Sonja Mast LPN  Outcome: Progressing     Problem: Acute Kidney Injury/Impairment  Goal: Fluid and Electrolyte Balance  6/1/2025 0709 by Sonja Mast LPN  Outcome: Progressing  6/1/2025 0708 by Sonja Mast LPN  Outcome: Progressing  Goal: Improved Oral Intake  6/1/2025 0709 by Sonja Mast LPN  Outcome: Progressing  6/1/2025 0708 by Sonja Mast LPN  Outcome: Progressing  Goal: Effective Renal Function  6/1/2025 0709 by Sonja Mast LPN  Outcome: Progressing  6/1/2025 0708 by Sonja Mast LPN  Outcome: Progressing     Problem: Skin Injury Risk Increased  Goal: Skin Health and Integrity  6/1/2025 0709 by Sonja Mast LPN  Outcome: Progressing  6/1/2025 0708 by Sonja Mast LPN  Outcome: Progressing     Problem: Dysrhythmia  Goal: Normalized Cardiac Rhythm  6/1/2025 0709 by Sonja Mast LPN  Outcome: Progressing  6/1/2025 0708 by Sonja Mast LPN  Outcome: Progressing

## 2025-06-02 LAB
ALBUMIN SERPL-MCNC: 3 G/DL (ref 3.4–4.8)
ALBUMIN/GLOB SERPL: 0.8 RATIO (ref 1.1–2)
ALP SERPL-CCNC: 399 UNIT/L (ref 40–150)
ALT SERPL-CCNC: 116 UNIT/L (ref 0–55)
ANION GAP SERPL CALC-SCNC: 15 MEQ/L
ANION GAP SERPL CALC-SCNC: 15 MEQ/L
APICAL FOUR CHAMBER EJECTION FRACTION: 23 %
APICAL TWO CHAMBER EJECTION FRACTION: 19 %
APTT PPP: 30.6 SECONDS (ref 23.2–33.7)
AST SERPL-CCNC: 122 UNIT/L (ref 11–45)
AV INDEX (PROSTH): 0.41
AV MEAN GRADIENT: 7 MMHG
AV PEAK GRADIENT: 12 MMHG
AV VELOCITY RATIO: 0.47
B PERT.PT PRMT NPH QL NAA+NON-PROBE: NOT DETECTED
BASOPHILS # BLD AUTO: 0.01 X10(3)/MCL
BASOPHILS NFR BLD AUTO: 0.2 %
BILIRUB SERPL-MCNC: 5 MG/DL
BSA FOR ECHO PROCEDURE: 2.01 M2
BUN SERPL-MCNC: 74.9 MG/DL (ref 8.4–25.7)
BUN SERPL-MCNC: 77.6 MG/DL (ref 8.4–25.7)
C PNEUM DNA NPH QL NAA+NON-PROBE: NOT DETECTED
CALCIUM SERPL-MCNC: 10.3 MG/DL (ref 8.8–10)
CALCIUM SERPL-MCNC: 9.9 MG/DL (ref 8.8–10)
CHLORIDE SERPL-SCNC: 87 MMOL/L (ref 98–107)
CHLORIDE SERPL-SCNC: 88 MMOL/L (ref 98–107)
CO2 SERPL-SCNC: 37 MMOL/L (ref 23–31)
CO2 SERPL-SCNC: 38 MMOL/L (ref 23–31)
CREAT SERPL-MCNC: 1.9 MG/DL (ref 0.72–1.25)
CREAT SERPL-MCNC: 2.12 MG/DL (ref 0.72–1.25)
CREAT/UREA NIT SERPL: 37
CREAT/UREA NIT SERPL: 39
CV ECHO LV RWT: 0.33 CM
D DIMER PPP IA.FEU-MCNC: 1.41 UG/ML FEU (ref 0–0.5)
DOP CALC AO PEAK VEL: 1.7 M/S
DOP CALC AO VTI: 30.3 CM
DOP CALC LVOT PEAK VEL: 0.8 M/S
DOP CALC MV VTI: 34.2 CM
DOP CALCLVOT PEAK VEL VTI: 12.5 CM
E WAVE DECELERATION TIME: 376 MSEC
E/A RATIO: 44.5
E/E' RATIO: 13 M/S
ECHO LV POSTERIOR WALL: 1.1 CM (ref 0.6–1.1)
EOSINOPHIL # BLD AUTO: 0.01 X10(3)/MCL (ref 0–0.9)
EOSINOPHIL NFR BLD AUTO: 0.2 %
ERYTHROCYTE [DISTWIDTH] IN BLOOD BY AUTOMATED COUNT: 29.3 % (ref 11.5–17)
FIBRINOGEN PPP-MCNC: 357 MG/DL (ref 210–463)
FRACTIONAL SHORTENING: 13.6 % (ref 28–44)
FSP PPP-MCNC: <5 MCG/ML
GFR SERPLBLD CREATININE-BSD FMLA CKD-EPI: 33 ML/MIN/1.73/M2
GFR SERPLBLD CREATININE-BSD FMLA CKD-EPI: 38 ML/MIN/1.73/M2
GLOBULIN SER-MCNC: 3.9 GM/DL (ref 2.4–3.5)
GLUCOSE SERPL-MCNC: 147 MG/DL (ref 82–115)
GLUCOSE SERPL-MCNC: 187 MG/DL (ref 82–115)
HADV DNA NPH QL NAA+NON-PROBE: NOT DETECTED
HCOV 229E RNA NPH QL NAA+NON-PROBE: NOT DETECTED
HCOV HKU1 RNA NPH QL NAA+NON-PROBE: NOT DETECTED
HCOV NL63 RNA NPH QL NAA+NON-PROBE: NOT DETECTED
HCOV OC43 RNA NPH QL NAA+NON-PROBE: NOT DETECTED
HCT VFR BLD AUTO: 30.3 % (ref 42–52)
HGB BLD-MCNC: 9.7 G/DL (ref 14–18)
HMPV RNA NPH QL NAA+NON-PROBE: NOT DETECTED
HOLD SPECIMEN: NORMAL
HPIV1 RNA NPH QL NAA+NON-PROBE: NOT DETECTED
HPIV2 RNA NPH QL NAA+NON-PROBE: NOT DETECTED
HPIV3 RNA NPH QL NAA+NON-PROBE: NOT DETECTED
HPIV4 RNA NPH QL NAA+NON-PROBE: NOT DETECTED
HR MV ECHO: 79 BPM
IMM GRANULOCYTES # BLD AUTO: 0.04 X10(3)/MCL (ref 0–0.04)
IMM GRANULOCYTES NFR BLD AUTO: 0.6 %
INTERVENTRICULAR SEPTUM: 1.2 CM (ref 0.6–1.1)
LEFT ATRIUM SIZE: 5.4 CM
LEFT INTERNAL DIMENSION IN SYSTOLE: 5.7 CM (ref 2.1–4)
LEFT VENTRICLE DIASTOLIC VOLUME INDEX: 112 ML/M2
LEFT VENTRICLE DIASTOLIC VOLUME: 224 ML
LEFT VENTRICLE END DIASTOLIC VOLUME APICAL 2 CHAMBER: 186.81 ML
LEFT VENTRICLE END DIASTOLIC VOLUME APICAL 4 CHAMBER: 118.95 ML
LEFT VENTRICLE MASS INDEX: 174 G/M2
LEFT VENTRICLE SYSTOLIC VOLUME INDEX: 80.5 ML/M2
LEFT VENTRICLE SYSTOLIC VOLUME: 161 ML
LEFT VENTRICULAR INTERNAL DIMENSION IN DIASTOLE: 6.6 CM (ref 3.5–6)
LEFT VENTRICULAR MASS: 347.9 G
LV LATERAL E/E' RATIO: 9.9 M/S
LV SEPTAL E/E' RATIO: 17.8 M/S
LVED V (TEICH): 223.95 ML
LVES V (TEICH): 160.84 ML
LVOT MG: 1.1 MMHG
LVOT MV: 0.47 CM/S
LYMPHOCYTES # BLD AUTO: 0.61 X10(3)/MCL (ref 0.6–4.6)
LYMPHOCYTES NFR BLD AUTO: 9.2 %
M PNEUMO DNA NPH QL NAA+NON-PROBE: NOT DETECTED
MAGNESIUM SERPL-MCNC: 2.3 MG/DL (ref 1.6–2.6)
MCH RBC QN AUTO: 25.3 PG (ref 27–31)
MCHC RBC AUTO-ENTMCNC: 32 G/DL (ref 33–36)
MCV RBC AUTO: 78.9 FL (ref 80–94)
MONOCYTES # BLD AUTO: 0.43 X10(3)/MCL (ref 0.1–1.3)
MONOCYTES NFR BLD AUTO: 6.5 %
MRSA PCR SCRN (OHS): NOT DETECTED
MV MEAN GRADIENT: 1 MMHG
MV PEAK A VEL: 0.02 M/S
MV PEAK E VEL: 0.89 M/S
MV PEAK GRADIENT: 3 MMHG
MV STENOSIS PRESSURE HALF TIME: 108.95 MS
MV VALVE AREA P 1/2 METHOD: 2.02 CM2
NEUTROPHILS # BLD AUTO: 5.56 X10(3)/MCL (ref 2.1–9.2)
NEUTROPHILS NFR BLD AUTO: 83.3 %
NRBC BLD AUTO-RTO: 4.5 %
OHS CV RV/LV RATIO: 0.58 CM
OHS LV EJECTION FRACTION SIMPSONS BIPLANE MOD: 22 %
OHS QRS DURATION: 102 MS
OHS QTC CALCULATION: 438 MS
PHOSPHATE SERPL-MCNC: 3.3 MG/DL (ref 2.3–4.7)
PISA TR MAX VEL: 3.1 M/S
PLATELET # BLD AUTO: 56 X10(3)/MCL (ref 130–400)
PLATELETS.RETICULATED NFR BLD AUTO: 7.3 % (ref 0.9–11.2)
PMV BLD AUTO: ABNORMAL FL
POCT GLUCOSE: 140 MG/DL (ref 70–110)
POTASSIUM SERPL-SCNC: 2.9 MMOL/L (ref 3.5–5.1)
POTASSIUM SERPL-SCNC: 2.9 MMOL/L (ref 3.5–5.1)
PROT SERPL-MCNC: 6.9 GM/DL (ref 5.8–7.6)
RA PRESSURE ESTIMATED: 15 MMHG
RBC # BLD AUTO: 3.84 X10(6)/MCL (ref 4.7–6.1)
RIGHT VENTRICLE DIASTOLIC BASEL DIMENSION: 3.8 CM
RIGHT VENTRICULAR END-DIASTOLIC DIMENSION: 3.83 CM
RSV RNA NPH QL NAA+NON-PROBE: NOT DETECTED
RV TB RVSP: 18 MMHG
RV+EV RNA NPH QL NAA+NON-PROBE: NOT DETECTED
SODIUM SERPL-SCNC: 139 MMOL/L (ref 136–145)
SODIUM SERPL-SCNC: 141 MMOL/L (ref 136–145)
TDI LATERAL: 0.09 M/S
TDI SEPTAL: 0.05 M/S
TDI: 0.07 M/S
TR MAX PG: 38 MMHG
TRICUSPID ANNULAR PLANE SYSTOLIC EXCURSION: 1.7 CM
TROPONIN I SERPL-MCNC: 0.15 NG/ML (ref 0–0.04)
TROPONIN I SERPL-MCNC: 0.16 NG/ML (ref 0–0.04)
TROPONIN I SERPL-MCNC: 0.17 NG/ML (ref 0–0.04)
TV REST PULMONARY ARTERY PRESSURE: 53 MMHG
WBC # BLD AUTO: 6.66 X10(3)/MCL (ref 4.5–11.5)
Z-SCORE OF LEFT VENTRICULAR DIMENSION IN END DIASTOLE: 1.24
Z-SCORE OF LEFT VENTRICULAR DIMENSION IN END SYSTOLE: 3.68

## 2025-06-02 PROCEDURE — 99223 1ST HOSP IP/OBS HIGH 75: CPT | Mod: GC,,, | Performed by: INTERNAL MEDICINE

## 2025-06-02 PROCEDURE — 25000003 PHARM REV CODE 250

## 2025-06-02 PROCEDURE — 63600175 PHARM REV CODE 636 W HCPCS: Performed by: INTERNAL MEDICINE

## 2025-06-02 PROCEDURE — 36415 COLL VENOUS BLD VENIPUNCTURE: CPT

## 2025-06-02 PROCEDURE — 20000000 HC ICU ROOM

## 2025-06-02 PROCEDURE — 25000242 PHARM REV CODE 250 ALT 637 W/ HCPCS

## 2025-06-02 PROCEDURE — 87641 MR-STAPH DNA AMP PROBE: CPT

## 2025-06-02 PROCEDURE — 94640 AIRWAY INHALATION TREATMENT: CPT

## 2025-06-02 PROCEDURE — 25500020 PHARM REV CODE 255: Performed by: INTERNAL MEDICINE

## 2025-06-02 PROCEDURE — 80053 COMPREHEN METABOLIC PANEL: CPT

## 2025-06-02 PROCEDURE — 63600175 PHARM REV CODE 636 W HCPCS

## 2025-06-02 PROCEDURE — 85384 FIBRINOGEN ACTIVITY: CPT

## 2025-06-02 PROCEDURE — 87486 CHLMYD PNEUM DNA AMP PROBE: CPT

## 2025-06-02 PROCEDURE — 27000221 HC OXYGEN, UP TO 24 HOURS

## 2025-06-02 PROCEDURE — 84100 ASSAY OF PHOSPHORUS: CPT

## 2025-06-02 PROCEDURE — 25000003 PHARM REV CODE 250: Performed by: STUDENT IN AN ORGANIZED HEALTH CARE EDUCATION/TRAINING PROGRAM

## 2025-06-02 PROCEDURE — 27000207 HC ISOLATION

## 2025-06-02 PROCEDURE — 85362 FIBRIN DEGRADATION PRODUCTS: CPT

## 2025-06-02 PROCEDURE — 99900035 HC TECH TIME PER 15 MIN (STAT)

## 2025-06-02 PROCEDURE — 97168 OT RE-EVAL EST PLAN CARE: CPT

## 2025-06-02 PROCEDURE — 25000003 PHARM REV CODE 250: Performed by: INTERNAL MEDICINE

## 2025-06-02 PROCEDURE — 84484 ASSAY OF TROPONIN QUANT: CPT

## 2025-06-02 PROCEDURE — 85379 FIBRIN DEGRADATION QUANT: CPT

## 2025-06-02 PROCEDURE — 85025 COMPLETE CBC W/AUTO DIFF WBC: CPT

## 2025-06-02 PROCEDURE — 83735 ASSAY OF MAGNESIUM: CPT

## 2025-06-02 PROCEDURE — 85730 THROMBOPLASTIN TIME PARTIAL: CPT

## 2025-06-02 PROCEDURE — 97164 PT RE-EVAL EST PLAN CARE: CPT

## 2025-06-02 PROCEDURE — 94761 N-INVAS EAR/PLS OXIMETRY MLT: CPT

## 2025-06-02 RX ORDER — POTASSIUM CHLORIDE 7.45 MG/ML
10 INJECTION INTRAVENOUS
Status: COMPLETED | OUTPATIENT
Start: 2025-06-02 | End: 2025-06-02

## 2025-06-02 RX ORDER — POTASSIUM CHLORIDE 20 MEQ/1
40 TABLET, EXTENDED RELEASE ORAL ONCE
Status: COMPLETED | OUTPATIENT
Start: 2025-06-02 | End: 2025-06-02

## 2025-06-02 RX ORDER — FUROSEMIDE 10 MG/ML
120 INJECTION INTRAMUSCULAR; INTRAVENOUS ONCE
Status: COMPLETED | OUTPATIENT
Start: 2025-06-02 | End: 2025-06-02

## 2025-06-02 RX ORDER — POTASSIUM CHLORIDE 20 MEQ/1
40 TABLET, EXTENDED RELEASE ORAL ONCE
Status: DISCONTINUED | OUTPATIENT
Start: 2025-06-02 | End: 2025-06-02

## 2025-06-02 RX ORDER — POTASSIUM CHLORIDE 7.45 MG/ML
10 INJECTION INTRAVENOUS
Status: DISPENSED | OUTPATIENT
Start: 2025-06-02 | End: 2025-06-02

## 2025-06-02 RX ORDER — NOREPINEPHRINE BITARTRATE/D5W 4MG/250ML
0-3 PLASTIC BAG, INJECTION (ML) INTRAVENOUS CONTINUOUS
Status: DISCONTINUED | OUTPATIENT
Start: 2025-06-02 | End: 2025-06-03

## 2025-06-02 RX ADMIN — IOHEXOL 80 ML: 350 INJECTION, SOLUTION INTRAVENOUS at 12:06

## 2025-06-02 RX ADMIN — NOREPINEPHRINE BITARTRATE 0.02 MCG/KG/MIN: 4 INJECTION, SOLUTION INTRAVENOUS at 10:06

## 2025-06-02 RX ADMIN — ATORVASTATIN CALCIUM 80 MG: 40 TABLET, FILM COATED ORAL at 09:06

## 2025-06-02 RX ADMIN — POTASSIUM CHLORIDE 10 MEQ: 7.46 INJECTION, SOLUTION INTRAVENOUS at 04:06

## 2025-06-02 RX ADMIN — LACTULOSE 30 G: 20 SOLUTION ORAL at 09:06

## 2025-06-02 RX ADMIN — PANTOPRAZOLE SODIUM 40 MG: 40 TABLET, DELAYED RELEASE ORAL at 09:06

## 2025-06-02 RX ADMIN — MUPIROCIN: 20 OINTMENT TOPICAL at 09:06

## 2025-06-02 RX ADMIN — SERTRALINE HYDROCHLORIDE 50 MG: 50 TABLET ORAL at 09:06

## 2025-06-02 RX ADMIN — POTASSIUM CHLORIDE 10 MEQ: 7.46 INJECTION, SOLUTION INTRAVENOUS at 05:06

## 2025-06-02 RX ADMIN — POTASSIUM BICARBONATE 50 MEQ: 978 TABLET, EFFERVESCENT ORAL at 02:06

## 2025-06-02 RX ADMIN — POTASSIUM CHLORIDE 40 MEQ: 1500 TABLET, EXTENDED RELEASE ORAL at 09:06

## 2025-06-02 RX ADMIN — IPRATROPIUM BROMIDE AND ALBUTEROL SULFATE 3 ML: 2.5; .5 SOLUTION RESPIRATORY (INHALATION) at 10:06

## 2025-06-02 RX ADMIN — IPRATROPIUM BROMIDE AND ALBUTEROL SULFATE 3 ML: 2.5; .5 SOLUTION RESPIRATORY (INHALATION) at 07:06

## 2025-06-02 RX ADMIN — FUROSEMIDE 120 MG: 10 INJECTION, SOLUTION INTRAVENOUS at 12:06

## 2025-06-02 RX ADMIN — POTASSIUM CHLORIDE 10 MEQ: 7.46 INJECTION, SOLUTION INTRAVENOUS at 06:06

## 2025-06-02 RX ADMIN — QUETIAPINE FUMARATE 25 MG: 25 TABLET ORAL at 10:06

## 2025-06-02 RX ADMIN — CLOPIDOGREL BISULFATE 75 MG: 75 TABLET, FILM COATED ORAL at 09:06

## 2025-06-02 RX ADMIN — POTASSIUM CHLORIDE 40 MEQ: 1500 TABLET, EXTENDED RELEASE ORAL at 05:06

## 2025-06-02 RX ADMIN — THIAMINE HCL TAB 100 MG 500 MG: 100 TAB at 09:06

## 2025-06-02 RX ADMIN — LACTULOSE 30 G: 20 SOLUTION ORAL at 10:06

## 2025-06-02 RX ADMIN — Medication 1000 UNITS: at 09:06

## 2025-06-02 RX ADMIN — POTASSIUM CHLORIDE 10 MEQ: 7.46 INJECTION, SOLUTION INTRAVENOUS at 08:06

## 2025-06-02 RX ADMIN — POTASSIUM CHLORIDE 10 MEQ: 7.46 INJECTION, SOLUTION INTRAVENOUS at 10:06

## 2025-06-02 RX ADMIN — IPRATROPIUM BROMIDE AND ALBUTEROL SULFATE 3 ML: 2.5; .5 SOLUTION RESPIRATORY (INHALATION) at 03:06

## 2025-06-02 RX ADMIN — POTASSIUM CHLORIDE 10 MEQ: 7.46 INJECTION, SOLUTION INTRAVENOUS at 03:06

## 2025-06-02 RX ADMIN — MUPIROCIN: 20 OINTMENT TOPICAL at 10:06

## 2025-06-02 RX ADMIN — LACTULOSE 30 G: 20 SOLUTION ORAL at 03:06

## 2025-06-02 RX ADMIN — THERA TABS 1 TABLET: TAB at 09:06

## 2025-06-02 RX ADMIN — FOLIC ACID 1 MG: 1 TABLET ORAL at 09:06

## 2025-06-02 NOTE — PROGRESS NOTES
[facility] - ICU  Pulmonary Critical Care Note    Patient Name: Ran Reyes Jr.  MRN: [id number]  Admission Date: 5/23/2025  Hospital Length of Stay: 9 days  Code Status: Full Code  Attending Provider: Dr. Roche  Primary Care Provider: Abiodun Recinos DO     Subjective:     HPI:   This is a 69 year old male who was initially admitted on 05/23/2025  for generalized weakness, fatigue and bilateral lower extremity edema.  He is also requesting nursing home placement as he has no family assistance.  Of note, patient was recently discharged at [facility] last 05/20/2025 after being treated for cardiogenic shock, adrenal insufficiency.  Since admission, patient is being treated for encephalopathy likely secondary to alcohol use disorder, decompensated heart failure with reduced ejection fraction, and acute kidney injury.  Patient given IV diuresis as blood pressure allows, prednisone taper was continued for adrenal insufficiency, gastroenterology was consulted for cirrhosis (cardiac versus alcohol versus multifactorial) and due to prolonged INR initially rivaroxaban was withheld, nephrology was on board for acute on chronic kidney disease.     Hospital Course/Significant events:  05/23/2025: Admitted at hospital medicine   06/02/2025:  Upgraded to ICU    24 Hour Interval History:  No acute events overnight. Labs this AM: K improved to 3.7 from 2.9. LFTs improving.. I/O: Net +110.3 ; 1.2ml/kg/hr urine output past 24 hours, net positive. 120 of lasix given yesterday. Started on levo. Diarrhea episode yesterday (on lactulose)    Past Medical History:   Diagnosis Date    A-fib     Anticoagulant long-term use     Anxiety     Aortic aneurysm     Cataract     CHF (congestive heart failure)     Chronic atrial fibrillation     COPD (chronic obstructive pulmonary disease)     Coronary artery disease     Depression     HLD (hyperlipidemia)     Hypertension     Mitral regurgitation     PAD (peripheral artery disease)     Primary  open angle glaucoma (POAG)        Past Surgical History:   Procedure Laterality Date    ANGIOGRAM, CORONARY, WITH LEFT HEART CATHETERIZATION N/A 03/26/2024    Procedure: Angiogram, Coronary, with Left Heart Cath;  Surgeon: Adriano Montiel MD;  Location: [facility] CATH LAB;  Service: Cardiology;  Laterality: N/A;    ATHERECTOMY OF PERIPHERAL VESSEL Left 09/12/2022    LEFT SFA ATHERECTOMY, BALLOON ANGIOPLASTY    CATARACT EXTRACTION W/  INTRAOCULAR LENS IMPLANT Left 03/09/2023    Procedure: EXTRACTION, CATARACT, WITH IOL INSERTION;  Surgeon: Georgi Borja MD;  Location: [facility] OR;  Service: Ophthalmology;  Laterality: Left;  19.5  mac    EGD, WITH CLOSED BIOPSY  03/22/2024    Procedure: EGD, WITH CLOSED BIOPSY;  Surgeon: Ronald Calderon MD;  Location: [facility] ENDOSCOPY;  Service: Gastroenterology;;    ESOPHAGOGASTRODUODENOSCOPY N/A 03/22/2024    Procedure: EGD;  Surgeon: Ronald Calderon MD;  Location: [facility] ENDOSCOPY;  Service: Gastroenterology;  Laterality: N/A;    Heart Stent N/A     > 10yrs. AGO    INCISION AND DRAINAGE N/A 03/18/2024    Procedure: Incision and Drainage;  Surgeon: Fahad Rivas MD;  Location: [facility];  Service: Urology;  Laterality: N/A;  I&D SCROTAL ABSCESS    INSERTION OF STENT INTO PERIPHERAL VESSEL Right 10/17/2022    RIGHT SFA ATHERECTOMY, BALLOON ANGIOPLASTY, STENT; RIGHT ANTERIOR TIBIAL ARTERY ATHERECTOMY, BALLOON ANGIOPLASTY    ORCHIECTOMY Left 03/18/2024    Procedure: ORCHIECTOMY;  Surgeon: Fahad Rivas MD;  Location: [facility];  Service: Urology;  Laterality: Left;       Social History[1]    Current Outpatient Medications   Medication Instructions    acetaminophen 650 mg, 2 times daily PRN    albuterol (PROVENTIL/VENTOLIN HFA) 90 mcg/actuation inhaler 2 puffs, Inhalation, Every 6 hours PRN, Rescue    albuterol-ipratropium (DUO-NEB) 2.5 mg-0.5 mg/3 mL nebulizer solution 3 mLs, Nebulization, Every 6 hours PRN, Rescue    atorvastatin (LIPITOR) 80 mg, Oral,  Daily    BREZTRI AEROSPHERE 160-9-4.8 mcg/actuation HFAA 2 puffs, 2 times daily    bumetanide (BUMEX) 1 mg, Oral, Daily PRN    clopidogreL (PLAVIX) 75 mg, Oral, Daily    diphenhydrAMINE (BENADRYL) 25 mg, Oral, Every 6 hours PRN    folic acid (FOLVITE) 1 mg, Oral, Daily    gabapentin (NEURONTIN) 300 mg, Oral, 3 times daily    olopatadine (PATANOL) 0.1 % ophthalmic solution Apply to eye.    pantoprazole (PROTONIX) 40 mg, Oral, Daily    potassium chloride SA (K-DUR,KLOR-CON) 20 MEQ tablet 20 mEq, Oral, 2 times daily    predniSONE (DELTASONE) 5 MG tablet Take 4 tablets (20 mg total) by mouth 2 (two) times daily for 7 days, THEN 3 tablets (15 mg total) 2 (two) times daily for 7 days, THEN 2 tablets (10 mg total) 2 (two) times daily for 7 days, THEN 1 tablet (5 mg total) 2 (two) times daily for 7 days.    QUEtiapine (SEROQUEL) 100 mg, Oral, Nightly    rivaroxaban (XARELTO) 20 mg, Oral, Daily    senna-docusate (PERICOLACE) 8.6-50 mg per tablet 1 tablet, Oral, 2 times daily PRN    sertraline (ZOLOFT) 100 mg, Oral, Daily    sulfamethoxazole-trimethoprim 800-160mg (BACTRIM DS) 800-160 mg Tab 1 tablet, Oral, 2 times daily    [Paused] urea (CARMOL) 40 % Crea Apply topically.    [Paused] varenicline tartrate (CHANTIX) 0.5 mg    vitamin D (VITAMIN D3) 1,000 Units, Daily     Current Inpatient Medications   atorvastatin  80 mg Oral Daily    clopidogreL  75 mg Oral Daily    epoetin deep-ebpx (RETACRIT) injection  10,000 Units Subcutaneous Q7 Days    folic acid  1 mg Oral Daily    furosemide (LASIX) injection  120 mg Intravenous Once    lactulose  30 g Oral TID    multivitamin  1 tablet Oral Daily    mupirocin   Nasal BID    pantoprazole  40 mg Oral Daily    QUEtiapine  25 mg Oral QHS    sertraline  50 mg Oral Daily    thiamine  500 mg Oral Daily    vitamin D  1,000 Units Oral Daily     Current Intravenous Infusions   NORepinephrine bitartrate-D5W  0-3 mcg/kg/min Intravenous Continuous 6.2 mL/hr at 06/02/25 1020 0.02 mcg/kg/min at  06/02/25 1020       Objective:     Intake/Output Summary (Last 24 hours) at 6/2/2025 1206  Last data filed at 6/2/2025 0628  Gross per 24 hour   Intake 1160.33 ml   Output 3000 ml   Net -1839.67 ml     Vital Signs (Most Recent):  Temp: 97.5 °F (36.4 °C) (06/02/25 0701)  Pulse: (!) 59 (06/02/25 1115)  Resp: 19 (06/02/25 1115)  BP: (!) 101/55 (06/02/25 1115)  SpO2: 96 % (06/02/25 1115)  Body mass index is 25.83 kg/m².  Weight: 81.6 kg (180 lb) Vital Signs (24h Range):  Temp:  [97.5 °F (36.4 °C)-98.1 °F (36.7 °C)] 97.5 °F (36.4 °C)  Pulse:  [51-89] 59  Resp:  [15-30] 19  SpO2:  [81 %-100 %] 96 %  BP: ()/(38-87) 101/55     Physical Exam  General: NAD  HEENT: NC/AT; PERRL; nasal and oral mucosa moist and clear  Pulm: course bilaterally  BIPAP opn  CV: S1, S2 w/o murmurs or gallops; 2+ BLE edema   GI: Bowel sound present in all quadrants, abdomen soft to palpation  MSK: Full ROM of all extremities and spine w/o limitation or discomfort  Derm: Raw skin in sacral area   Neuro: AAOx4; motor/sensory function intact  Psych: Cooperative; appropriate mood and affect    Lines/Drains/Airways       Peripheral Intravenous Line  Duration                  Peripheral IV - Single Lumen 05/23/25 1838 20 G Left;Posterior Hand 9 days         Peripheral IV - Single Lumen 06/02/25 0015 18 G Anterior;Proximal;Right Upper Arm <1 day                  Significant Labs:  Lab Results   Component Value Date    WBC 6.66 06/02/2025    HGB 9.7 (L) 06/02/2025    HCT 30.3 (L) 06/02/2025    MCV 78.9 (L) 06/02/2025    PLT 56 (L) 06/02/2025       BMP  Lab Results   Component Value Date     06/02/2025    K 2.9 (L) 06/02/2025    CO2 37 (H) 06/02/2025    BUN 77.6 (H) 06/02/2025    CREATININE 2.12 (H) 06/02/2025    CALCIUM 9.9 06/02/2025    AGAP 15.0 06/02/2025    EGFRNONAA 88 (L) 12/06/2021     ABG  Recent Labs   Lab 06/01/25  4355   PH 7.570*   PO2 73.0*   PCO2 46.0*   HCO3 42.1*     Mechanical Ventilation Support:  Oxygen Concentration (%): 100  (06/02/25 1048)    Significant Imaging:  I have reviewed the pertinent imaging within the past 24 hours.    Assessment/Plan:     Assessment  Acute hypoxic respiratory failure likely secondary to decompensated heart failure  CXR 6/1/25:  Increased pulmonary markings bilaterally most consistent with pulmonary edema  Cirrhosis with hepatic encephalopathy   Elevated ammonia  Transaminitis  Thrombocytopenia  Decompensated heart failure with reduced ejection fraction 22%  Echo 04/30/2025:  Severe global hypokinesis with severely reduced systolic function EF of 22%, grade 3 diastolic dysfunction  Asymptomatic bacteriuria   Urine culture with chronic growth of ESBL E coli  Adrenal insufficiency on steroid taper  Acute on chronic kidney disease  Hyponatremia-improved  Hypokalemia  Chronic microcytic anemia  Iron-deficiency  Worsening thrombocytopenia  Atrial fibrillation  Nonobstructive epicardial coronary artery disease  Elevated troponin  Chronic obstructive pulmonary disease  Peripheral artery disease   Hypercholesterolemia  Hypertension  Anxiety/insomnia  Bedbug infestation     Plan  Admitedt to ICU for closer monitoring, low threshold for intubation  CT head to assess altered mental status - neg for acute path  CTA to rule out pulmonary embolism in the setting of hypoxemia and currently not on VTE prophylaxis (previously on Xarelto but was stopped due to prolonged INR) - showed significant volume overload  Continue oxygen supplementation to maintain saturation 88-92%  Started levo for pressure support ;  One dose of 2 mg of IV Bumex was given 2 days ago ; 120 lasix given yesterday after pressors started  Repeat echocardiogram shows severe mitral regurge ; consulting cards  Beta-blocker withheld due to bradycardia and needs blood pressure room for diuresis  lactulose 20 g t.i.d., titrate accordingly with goal BM 2 to 3 times a day  Monitor and replete electrolytes accordingly  Continue prednisone taper  GI  agreed with  restarting oral anticoags  Giving dose of diamox per nephro recs for acidosis   Taper off medications contributing to somnolence  Thrombocytopenia acutely worsening, evaluate for DIC  Hold initiation of empiric antibiotics for now, pt had  previous cultures with multi-drug resistant E coli  Diflucan course ; 200 then 100 x 2 days     DVT Prophylaxis: SCD  GI Prophylaxis: Protonix      32 minutes of critical care was time spent personally by me on the following activities: development of treatment plan with patient or surrogate and bedside caregivers, discussions with consultants, evaluation of patient's response to treatment, examination of patient, ordering and performing treatments and interventions, ordering and review of laboratory studies, ordering and review of radiographic studies, pulse oximetry, re-evaluation of patient's condition.  This critical care time did not overlap with that of any other provider or involve time for any procedures.     Kishan Soto MD  [facility], PGY-2    Pulmonary & Critical Care Medicine  [facility] - ICU         [1]   Social History  Socioeconomic History    Marital status: Single   Tobacco Use    Smoking status: Every Day     Current packs/day: 0.50     Average packs/day: 0.8 packs/day for 50.4 years (41.0 ttl pk-yrs)     Types: Cigarettes     Start date: 1975     Passive exposure: Current    Smokeless tobacco: Never    Tobacco comments:     States smoke 2-3 cigarettes per day   Substance and Sexual Activity    Alcohol use: Yes     Alcohol/week: 3.0 standard drinks of alcohol     Types: 3 Cans of beer per week     Comment: socially    Drug use: Not Currently     Frequency: 1.0 times per week     Types: Marijuana     Comment: no use in over 1 year    Sexual activity: Not Currently     Partners: Female     Social Drivers of Health     Financial Resource Strain: Low Risk  (5/24/2025)    Overall Financial Resource Strain (CARDIA)     Difficulty of Paying Living  Expenses: Not hard at all   Food Insecurity: No Food Insecurity (5/24/2025)    Hunger Vital Sign     Worried About Running Out of Food in the Last Year: Never true     Ran Out of Food in the Last Year: Never true   Transportation Needs: No Transportation Needs (5/24/2025)    PRAPARE - Transportation     Lack of Transportation (Medical): No     Lack of Transportation (Non-Medical): No   Physical Activity: Inactive (5/24/2025)    Exercise Vital Sign     Days of Exercise per Week: 0 days     Minutes of Exercise per Session: 0 min   Stress: No Stress Concern Present (5/24/2025)    [employer] - Occupational Stress Questionnaire     Feeling of Stress : Not at all   Housing Stability: Low Risk  (5/24/2025)    Housing Stability Vital Sign     Unable to Pay for Housing in the Last Year: No     Homeless in the Last Year: No

## 2025-06-02 NOTE — CARE UPDATE
Was contacted at 2140 by patient's nurse that patient was hypoxic (sats 70's) and more altered than his baseline. Patient was evaluated at bedside by night team. Patient was on a non-rebreather with sats in the 90s. /78. HR 58. He was responsive to verbal commands and oriented to name and place, but not time with garbled speech. Per nursing staff, patient was more lethargic and speech was more clear earlier in the day. Crackles heard bilaterally on lung exam with obvious abdominal breathing. No other retractions noted. Patient denied any chest pain or shortness of breath.     Tiffanie KING, was contacted and patient status was discussed. She was informed that given patients current state, he is at high risk for intubation given his increased work of breathing and comorbid conditions. She voiced understanding and wished to proceed with intubation if and when required.  Decision to intubate was confirmed. Patient to be transferred to ICU for higher level of care and likely intubation.

## 2025-06-02 NOTE — PT/OT/SLP RE-EVAL
"Physical Therapy Re-Evaluation    Patient Name:  Ran Reyes Jr.   MRN:  [id number]    Recommendations     Therapy Intensity Recommendations at Discharge: Low intensity (NH/snf care)  Discharge Equipment Recommendations: to be determined by next level of care   Equipment to be obtained for discharge: TBD pending progress.  Barriers to discharge: severity of deficits, level of skilled assistance required, decreased endurance, medical diagnosis, and complicated medical history    Assessment     Ran Reyes Jr. is a 69 y.o. male admitted with a "past medical history of COPD, hypertension, pulmonary hypertension, dyslipidemia, atrial fibrillation, nonobstructive coronary artery disease, nonischemic cardiomyopathy with severely reduced ejection fraction (22%), peripheral vascular disease, remote history of Claudine's gangrene, glaucoma, and tobacco abuse. Patient presented to the emergency department on 05/23/2025 with complaints of generalized weakness, fatigue, and lower extremity edema. He was admitted to medicine service for treatment of CHF exacerbation, developed acute kidney injury and multiple electrolyte abnormalities"    Significant functional decline being demonstrated, re-eval completed since t/f to ICU and new goals added to best address current level of function.      1. Chronic congestive heart failure, unspecified heart failure type    2. Liver cirrhosis    3. Congestive heart failure, unspecified HF chronicity, unspecified heart failure type    4. Weakness    5. Longstanding persistent atrial fibrillation    6. ELIZABETH (acute kidney injury)    7. Acute cystitis without hematuria    8. Unable to care for self    9. Chest pain    10. Elevated troponin    11. Arteriosclerosis of coronary artery    12. Hepatic cirrhosis, unspecified hepatic cirrhosis type, unspecified whether ascites present    13. Anemia in chronic kidney disease, unspecified CKD stage    14. Hypokalemia    15. Hyponatremia    16. " "Tobacco dependency    17. SOB (shortness of breath)    18. Routine check-up       Problem List[1]   He presents with the following impairments/functional limitations:  weakness, impaired endurance, impaired self care skills, impaired functional mobility, impaired balance, decreased lower extremity function, decreased upper extremity function, impaired cardiopulmonary response to activity.    Rehab Prognosis: Fair.    Patient would benefit from continued skilled acute PT services to: address above listed impairments/functional limitations; receive patient/caregiver education; reduce fall risk; and maximize independency/safety with functional mobility.    Recent Surgery: * No surgery found *      Plan     During this hospitalization, patient to be seen 3x/week to address the identified impairments/functional limitations via therapeutic activities, therapeutic exercises, gait training, neuromuscular re-education, wheelchair management/training and progress toward the established goals.    Plan of Care Expires:  06/23/25    Plan of Care reviewed with: patient    Subjective     Communicated with patient's nurse Aurora prior to session.    Patient agreeable to participate in re-evaluation.    Chief Complaint: Fatigue  Patient/Family Comments/goals: "take it easy"  Pain/Comfort:  Pain Rating 1: 0/10  Pain Rating Post-Intervention 1: 0/10    Patients cultural, spiritual, Orthodoxy conflicts given the current situation: no    Objective     Patient found with HOB elevated with PureWick, oxygen, pulse ox (continuous), blood pressure cuff, peripheral IV  upon PT entry to room.    General Precautions: Standard, fall, contact   Orthopedic Precautions:N/A   Braces:  N/A  Respiratory Status: 15 liters/min O2 via non-re breather (O2 saturation 96% during eval)    BP:   Elevated HOB: 120/70 at tx start  Sitting EOB: 79/62  Back in bed: 98/62 at tx end    Exams:  Orientation: Patient is oriented to person, place, time, " situation  Commands: Patient follows commands consistently    RLE ROM: WNL  RLE Strength: Deficits: 2 to 2+/5 globally  LLE ROM: WNL  LLE Strength: Deficits: 2 to 2+/5 globally    Functional Mobility:    Bed Mobility:  Rolling Left: maximal assistance  Rolling Right: maximal assistance  Scooting: maximal assistance, of 2 persons, and scooting EOB and scooting up in bed  Supine to Sit: maximal assistance  Sit to Supine: maximal assistance  with HOB elevated and hand rail      Other Mobility:  N/A - unable to tolerate     Balance:  Sit  Static: FAIR-: Maintains without assist but inconsistent   Dynamic: POOR: N/A      Patient left with HOB elevated with all lines intact, call button in reach, tray table at bedside, and patient's nurse notified.    DME Justifications:  TBD    Education     Patient educated on the importance of early mobility to prevent functional decline during hospital stay.  Patient educated on and assisted with functional mobility as noted above.  Patient educated on PT Plan of Care and role of PT in acute care.  Patient was instructed to utilize staff assistance for mobility/transfers.  White board updated regarding patient's safest level of mobility with staff assistance    Goals     Multidisciplinary Problems       Physical Therapy Goals          Problem: Physical Therapy    Goal Priority Disciplines Outcome Interventions   Physical Therapy Goal     PT, PT/OT Ongoing    Description: Goals to be met by: Discharge      Patient will increase functional independence with mobility by performing:    New goals established due to functional decline and upgrade to ICU    1. Improve B LE MMT to 3+/5  2. Rolling side to side to be completed at MIN A  3. Supine<>Sit to be completed at MIN A  4. Sit to stands to be assessed and completed once able.                        History     Past Medical History:   Diagnosis Date    A-fib     Anticoagulant long-term use     Anxiety     Aortic aneurysm     Cataract      CHF (congestive heart failure)     Chronic atrial fibrillation     COPD (chronic obstructive pulmonary disease)     Coronary artery disease     Depression     HLD (hyperlipidemia)     Hypertension     Mitral regurgitation     PAD (peripheral artery disease)     Primary open angle glaucoma (POAG)      Past Surgical History:   Procedure Laterality Date    ANGIOGRAM, CORONARY, WITH LEFT HEART CATHETERIZATION N/A 03/26/2024    Procedure: Angiogram, Coronary, with Left Heart Cath;  Surgeon: Adriano Montiel MD;  Location: [facility] CATH LAB;  Service: Cardiology;  Laterality: N/A;    ATHERECTOMY OF PERIPHERAL VESSEL Left 09/12/2022    LEFT SFA ATHERECTOMY, BALLOON ANGIOPLASTY    CATARACT EXTRACTION W/  INTRAOCULAR LENS IMPLANT Left 03/09/2023    Procedure: EXTRACTION, CATARACT, WITH IOL INSERTION;  Surgeon: Georgi Borja MD;  Location: [facility] OR;  Service: Ophthalmology;  Laterality: Left;  19.5  mac    EGD, WITH CLOSED BIOPSY  03/22/2024    Procedure: EGD, WITH CLOSED BIOPSY;  Surgeon: Ronald Calderon MD;  Location: [facility] ENDOSCOPY;  Service: Gastroenterology;;    ESOPHAGOGASTRODUODENOSCOPY N/A 03/22/2024    Procedure: EGD;  Surgeon: Ronald Calderon MD;  Location: [facility] ENDOSCOPY;  Service: Gastroenterology;  Laterality: N/A;    Heart Stent N/A     > 10yrs. AGO    INCISION AND DRAINAGE N/A 03/18/2024    Procedure: Incision and Drainage;  Surgeon: Fahad Rivas MD;  Location: [facility];  Service: Urology;  Laterality: N/A;  I&D SCROTAL ABSCESS    INSERTION OF STENT INTO PERIPHERAL VESSEL Right 10/17/2022    RIGHT SFA ATHERECTOMY, BALLOON ANGIOPLASTY, STENT; RIGHT ANTERIOR TIBIAL ARTERY ATHERECTOMY, BALLOON ANGIOPLASTY    ORCHIECTOMY Left 03/18/2024    Procedure: ORCHIECTOMY;  Surgeon: Fahad Rivas MD;  Location: [facility];  Service: Urology;  Laterality: Left;     Time Tracking     PT Received On: 06/02/25  PT Start Time: 1153     PT Stop Time: 1210  PT Total Time (min): 17 min     Billable  Minutes: Re-eval 17      06/02/2025         [1]   Patient Active Problem List  Diagnosis    Primary open angle glaucoma (POAG) of right eye, moderate stage    Combined forms of age-related cataract of left eye    Arteriosclerosis of coronary artery    Chronic atrial fibrillation    Dyslipidemia    Hypertension    Tobacco use    PVD (peripheral vascular disease)    VHD (valvular heart disease)    COVID-19    HFrEF (heart failure with reduced ejection fraction)    Nonrheumatic mitral valve regurgitation    Postoperative eye state    Scrotal hematoma    Chronic obstructive pulmonary disease, unspecified    Lesion of external ear    Low back pain    Other thrombophilia    Secondary hyperaldosteronism    Positive colorectal cancer screening using Cologuard test    Acute heart failure    History of COPD    CHF (congestive heart failure)    Acute cystitis with hematuria    Tobacco dependency    Liver cirrhosis    Hypokalemia    Hyponatremia    Unable to care for self

## 2025-06-02 NOTE — PT/OT/SLP RE-EVAL
Occupational Therapy   Re-evaluation    Name: Ran Reyes Jr.  MRN: [id number]  Admitting Diagnosis:    1. Chronic congestive heart failure, unspecified heart failure type    2. Liver cirrhosis    3. Congestive heart failure, unspecified HF chronicity, unspecified heart failure type    4. Weakness    5. Longstanding persistent atrial fibrillation    6. ELIZABETH (acute kidney injury)    7. Acute cystitis without hematuria    8. Unable to care for self    9. Chest pain    10. Elevated troponin    11. Arteriosclerosis of coronary artery    12. Hepatic cirrhosis, unspecified hepatic cirrhosis type, unspecified whether ascites present    13. Anemia in chronic kidney disease, unspecified CKD stage    14. Hypokalemia    15. Hyponatremia    16. Tobacco dependency    17. SOB (shortness of breath)    18. Routine check-up     Problem List[1]   Recent Surgery: * No surgery found *      Recommendations:     Discharge Recommendations: Low Intensity Therapy (24 hour caregiver support)  Discharge Equipment Recommendations: to be determined by next level of care  Barriers to discharge:  Decreased caregiver support, Other (Comment) (medical dx, fall risk, severity of deficits, level of skilled assist)    Assessment:     Ran Reyes Jr. is a 69 y.o. 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 Of note, discharged from this facility on 5/20/25 after admission for cardiogenic/septic shock. Adrenal insufficiency noted during that admission; discharged on prednisone taper. Documented inability to tolerate GDMT. Discharged with life vest. According to documentation, was  "discharged with HH. Today, patient states he lives alone and family members have not been coming to help regularly. States he has not eaten since yesterday afternoon as no family member came to bring him food. He did not take home meds today. He is requesting nursing home placement as he is no longer able to care for himself at home.     Pt was upgraded to ICU 6/1/25 due to medical decline. Pt presents today with lethargy , SOB, weakness  and limited tolerance for EOB activity with BP  decreasing to 79/62 upon assist supine to sit EOB. Pt with progressive functional decline since d/c home on 5/20/25. Pt cooperative during session despite weakness .      Performance deficits affecting function are weakness, impaired endurance, impaired self care skills, impaired functional mobility, gait instability, impaired balance, decreased lower extremity function, impaired skin, edema, impaired cardiopulmonary response to activity.      Rehab Prognosis:  fair ; patient would benefit from acute skilled OT services to address these deficits and reach maximum level of function.       Plan:     Patient to be seen 3 x/week to address the above listed problems via self-care/home management, therapeutic activities, therapeutic exercises  Plan of Care Expires:  (d/c)  Plan of Care Reviewed with: patient    Subjective     Chief Complaint: weakness and fatigue  Patient/Family stated goals: "let's just take it easy today"   Communicated with: Nurse Simon prior to session.  Pain/Comfort:  Pain Rating 1: 0/10  Pain Addressed 1: Nurse notified  Pain Rating Post-Intervention 1: 0/10    Objective:     Communicated with: Nurse Simon prior to session.  Patient found HOB elevated with: blood pressure cuff, oxygen, PureWick, peripheral IV, pulse ox (continuous), telemetry upon OT entry to room.    General Precautions: Standard, fall, contact  Orthopedic Precautions: N/A  Braces: N/A  Respiratory Status: Non-rebreather mask, flow 15  L/min and " with O2 sats >95 % during eval process     BP  Preactivity (HOB elevated)   Seated EOB   Post activity ( HOB elevated)   120/70     79/62   98/62    Occupational Performance:    Bed Mobility:    Patient completed Rolling/Turning to Left with  maximal assistance  Patient completed Rolling/Turning to Right with maximal assistance  Patient completed Scooting with maximal assistance and 2 persons while seated EOB and for scooting /positioning while supine in bed  Patient completed Supine to Sit with maximal assistance  Patient completed Sit to Supine with maximal assistance    Functional Mobility/Transfers:    Functional Mobility: unable to tolerate sit to stand attempt    Activities of Daily Living:  Feeding:  total assistance while seated upright in bed  Grooming: total assistance while seated upright in bed  Upper Body Dressing: total assistance bedlevel  Lower Body Dressing: total assistance bed level  Toileting: total assistance bed level and with purewick in place    Cognitive/Visual Perceptual:  Cognitive/Psychosocial Skills:     -       Oriented to: Person, Place, Time, and Situation   -       Follows Commands/attention:Follows multistep  commands    Physical Exam:  Balance: -       sitting balance static CGA , dynamic mod /max assist ; standing - unable to tolerate  Dominant hand: -       right   Upper Extremity Range of Motion:  -       Right Upper Extremity: Deficits: mod decreased proximally and min decreased distally while in supported sitting in bed   -       Left Upper Extremity: Deficits: mod decreased proximally and min decreased distally while in supported sitting in bed  Upper Extremity Strength:    -       Right Upper Extremity: Deficits: 2/2+/5 shoulder and 2+/5 elbow , forearm and wrist   -       Left Upper Extremity: Deficits: 2/2+/5 shoulder and 2+/5 elbow , forearm and wrist    Strength: -       Right Upper Extremity: Deficits: 2+/5   -       Left Upper Extremity: Deficits: 2+/5   Fine Motor  Coordination:    -       Impaired  Left hand thumb/finger opposition skills  , Right hand thumb/finger opposition skills  , Left hand, manipulation of objects  , and Right hand, manipulation of objects      Treatment & Education:  Pt. educated on OT goals, POC, orientation to environment, use of call bell for assist with transfers OOB or for any other needs due to fall risk.     Patient left HOB elevated with all lines intact, call button in reach, and nurse  notified    GOALS:   Multidisciplinary Problems       Occupational Therapy Goals          Problem: Occupational Therapy    Goal Priority Disciplines Outcome Interventions   Occupational Therapy Goal     OT, PT/OT Progressing    Description: Goals to be met by: DC     Patient will increase functional independence with ADLs by performing:    Reeval 6/2/25 due to upgrade to ICU and goals revised     Feeding in supported sitting  modified independent after assisted with set up .  UE Dressing with minimal assistance seated EOB .   LE Dressing with moderate assistance .  Grooming while seated at sink with stand by assistance   .  Toileting from bedside commode with Moderate assistance  for hygiene and clothing management.   Toilet step transfer to bedside commode with min  assistance  using RW.  Increase B UE strength to 3+/5 throughout as needed to impact self care performance                          DME Justifications:  TBD pending progress    History:     Past Medical History:   Diagnosis Date    A-fib     Anticoagulant long-term use     Anxiety     Aortic aneurysm     Cataract     CHF (congestive heart failure)     Chronic atrial fibrillation     COPD (chronic obstructive pulmonary disease)     Coronary artery disease     Depression     HLD (hyperlipidemia)     Hypertension     Mitral regurgitation     PAD (peripheral artery disease)     Primary open angle glaucoma (POAG)          Past Surgical History:   Procedure Laterality Date    ANGIOGRAM, CORONARY, WITH  LEFT HEART CATHETERIZATION N/A 03/26/2024    Procedure: Angiogram, Coronary, with Left Heart Cath;  Surgeon: Adriano Montiel MD;  Location: [facility] CATH LAB;  Service: Cardiology;  Laterality: N/A;    ATHERECTOMY OF PERIPHERAL VESSEL Left 09/12/2022    LEFT SFA ATHERECTOMY, BALLOON ANGIOPLASTY    CATARACT EXTRACTION W/  INTRAOCULAR LENS IMPLANT Left 03/09/2023    Procedure: EXTRACTION, CATARACT, WITH IOL INSERTION;  Surgeon: Georgi Borja MD;  Location: [facility];  Service: Ophthalmology;  Laterality: Left;  19.5  mac    EGD, WITH CLOSED BIOPSY  03/22/2024    Procedure: EGD, WITH CLOSED BIOPSY;  Surgeon: Ronald Calderon MD;  Location: [facility] ENDOSCOPY;  Service: Gastroenterology;;    ESOPHAGOGASTRODUODENOSCOPY N/A 03/22/2024    Procedure: EGD;  Surgeon: Ronald Calderon MD;  Location: [facility] ENDOSCOPY;  Service: Gastroenterology;  Laterality: N/A;    Heart Stent N/A     > 10yrs. AGO    INCISION AND DRAINAGE N/A 03/18/2024    Procedure: Incision and Drainage;  Surgeon: Fahad Rivas MD;  Location: [facility];  Service: Urology;  Laterality: N/A;  I&D SCROTAL ABSCESS    INSERTION OF STENT INTO PERIPHERAL VESSEL Right 10/17/2022    RIGHT SFA ATHERECTOMY, BALLOON ANGIOPLASTY, STENT; RIGHT ANTERIOR TIBIAL ARTERY ATHERECTOMY, BALLOON ANGIOPLASTY    ORCHIECTOMY Left 03/18/2024    Procedure: ORCHIECTOMY;  Surgeon: Fahad Rivas MD;  Location: [facility];  Service: Urology;  Laterality: Left;       Time Tracking:     OT Date of Treatment: 06/02/25  OT Start Time: 1148  OT Stop Time: 1214  OT Total Time (min): 26 min    Billable Minutes:Re-eval 26 min     6/2/2025         [1]   Patient Active Problem List  Diagnosis    Primary open angle glaucoma (POAG) of right eye, moderate stage    Combined forms of age-related cataract of left eye    Arteriosclerosis of coronary artery    Chronic atrial fibrillation    Dyslipidemia    Hypertension    Tobacco use    PVD (peripheral vascular disease)    VHD  (valvular heart disease)    COVID-19    HFrEF (heart failure with reduced ejection fraction)    Nonrheumatic mitral valve regurgitation    Postoperative eye state    Scrotal hematoma    Chronic obstructive pulmonary disease, unspecified    Lesion of external ear    Low back pain    Other thrombophilia    Secondary hyperaldosteronism    Positive colorectal cancer screening using Cologuard test    Acute heart failure    History of COPD    CHF (congestive heart failure)    Acute cystitis with hematuria    Tobacco dependency    Liver cirrhosis    Hypokalemia    Hyponatremia    Unable to care for self

## 2025-06-02 NOTE — PLAN OF CARE
Problem: Occupational Therapy  Goal: Occupational Therapy Goal  Description: Goals to be met by: DC     Patient will increase functional independence with ADLs by performing:    Reeval 6/2/25 due to upgrade to ICU and goals revised     Feeding in supported sitting  modified independent after assisted with set up .  UE Dressing with minimal assistance seated EOB .   LE Dressing with moderate assistance .  Grooming while seated at sink with stand by assistance  .  Toileting from bedside commode with Moderate assistance  for hygiene and clothing management.   Toilet step transfer to bedside commode with min  assistance  using RW.  Increase B UE strength to 3+/5 throughout as needed to impact self care performance     Outcome: Progressing

## 2025-06-02 NOTE — CARE UPDATE
Spoke with pt's niece Tiffanie (CHRISTINE), referral submitted to [facility] [place]. Acceptance pending. Will follow.

## 2025-06-02 NOTE — PROGRESS NOTES
Nephrology Progress Note    Patient Name: Ran Reyes Jr.  Age: 69 y.o.  : 1955  MRN: [id number]  Admission Date: 2025    Chief complaint: Fatigue and Leg Swelling (PT IN /[facility] W REPORT OF WEAKNESS/SOB AND EDEMA TO LOWER LEGS. STATES HAS NOT EATEN X 18 HRS.  CBG EN ROUTE 137. 20G IV TO LT HAND /EMS.   PT FOUND TO HAVE BED BUGS /[facility]. EKG TO BE OBTAINED. )    Hospital course  Ran Reyes Jr. is a 69 y.o. Black or  male with past medical history of COPD, hypertension, pulmonary hypertension, dyslipidemia, atrial fibrillation, nonobstructive coronary artery disease, nonischemic cardiomyopathy with severely reduced ejection fraction (22%), peripheral vascular disease, remote history of Claudine's gangrene, glaucoma, and tobacco abuse.  Patient presented to the emergency department on 2025 with complaints of generalized weakness, fatigue, and lower extremity edema.  He was admitted to medicine service for treatment of CHF exacerbation, developed acute kidney injury and multiple electrolyte abnormalities for which Nephrology Service was consulted.    Interval Hx  Patient is resting in bed, appears comfortable. Denies acute concerns at this time. He is AAOx3. LE edema appears improved.     Review of Systems  Cardiovascular: Negative for chest pain   Respiratory: Negative for shortness of breath    Objective  /82   Pulse 74   Temp 97.5 °F (36.4 °C) (Oral)   Resp (!) 26   Ht 6' (1.829 m)   Wt 82 kg (180 lb 12.4 oz)   SpO2 97%   BMI 24.52 kg/m²     Intake/Output Summary (Last 24 hours) at 2025 0926  Last data filed at 2025 0628  Gross per 24 hour   Intake 1695.33 ml   Output 3000 ml   Net -1304.67 ml       Physical Exam  General appearance: Patient is in no acute distress.  Skin: No rashes or wounds.  HEENT: PERRLA, EOMI, no scleral icterus, no JVD. Neck is supple.  Chest: Respirations are unlabored.   Heart: S1, S2.   Abdomen: Benign.  :  Deferred.  Extremities:  Trace bilateral lower extremity edema, improved, peripheral pulses are palpable.   Neuro: No focal deficits.     Medications  Scheduled Meds:   atorvastatin  80 mg Oral Daily    clopidogreL  75 mg Oral Daily    epoetin deep-ebpx (RETACRIT) injection  10,000 Units Subcutaneous Q7 Days    folic acid  1 mg Oral Daily    furosemide (LASIX) injection  120 mg Intravenous Once    lactulose  30 g Oral TID    multivitamin  1 tablet Oral Daily    mupirocin   Nasal BID    pantoprazole  40 mg Oral Daily    potassium chloride  40 mEq Oral Once    QUEtiapine  25 mg Oral QHS    sertraline  50 mg Oral Daily    thiamine  500 mg Oral Daily    vitamin D  1,000 Units Oral Daily     Continuous Infusions:  PRN Meds:.  Current Facility-Administered Medications:     albuterol-ipratropium, 3 mL, Nebulization, Q4H PRN    dextrose 50%, 12.5 g, Intravenous, PRN    dextrose 50%, 25 g, Intravenous, PRN    glucagon (human recombinant), 1 mg, Intramuscular, PRN    glucose, 16 g, Oral, PRN    glucose, 24 g, Oral, PRN    insulin aspart U-100, 0-5 Units, Subcutaneous, QID (AC + HS) PRN    melatonin, 6 mg, Oral, Nightly PRN    naloxone, 0.02 mg, Intravenous, PRN    sodium chloride 0.9%, 10 mL, Intravenous, Q12H PRN     Imaging:    Reviewed    Laboratory Data:    Chemistry  Recent Labs     05/31/25  0329 05/31/25  1018 05/31/25  1650 06/01/25  0531 06/01/25  2236 06/02/25  0206    136 137 137 141 139   K 2.9* 3.9 3.9 3.6 3.0* 2.9*   CO2 32* 27 31 31 35* 37*   BUN 64.1* 66.8* 68.2* 73.6* 73.8* 77.6*   CREATININE 1.95* 2.05* 2.01* 2.02* 2.06* 2.12*   EGFRNORACEVR 37 35 36 35 35 33   CALCIUM 9.9 9.7 9.9 10.0 9.6 9.9   MG 2.20  --   --  2.30 2.00 2.30   PHOS 2.8  --   --  2.3 2.8 3.3      LFT  Lab Results   Component Value Date    ALKPHOS 399 (H) 06/02/2025     (H) 06/02/2025     (H) 06/02/2025    BILIDIR 3.7 (H) 05/28/2025    IBILI 2.10 (H) 05/28/2025    BILITOT 5.0 (H) 06/02/2025    ALBUMIN 3.0 (L) 06/02/2025       CKD-MBD  Lab Results   Component Value Date    PTH 60.0 03/18/2024    ZPRTDQCZ31KD 37.3 10/07/2020      Hematology  Recent Labs     05/31/25  0329 06/01/25  0531 06/01/25  2236 06/02/25  0206   WBC 5.68 6.01 6.34 6.66   HGB 8.7* 9.5* 9.9* 9.7*   HCT 26.6* 29.3* 30.7* 30.3*   PLT 70* 89* 57* 56*      Lab Results   Component Value Date    IRON 34 (L) 05/28/2025    TIBC 317 05/28/2025    FERRITIN 190.16 05/28/2025    FOLATE 18.5 04/22/2025    CTTABEUD19 >2,000 (H) 04/22/2025     (H) 03/18/2024      ID  Lab Results   Component Value Date    HIV Nonreactive 03/19/2024    HEPCAB Nonreactive 05/28/2025      Additional serology  Lab Results   Component Value Date    HGBA1C 5.0 03/18/2024       Urine studies  Lab Results   Component Value Date    APPEARANCEUA Turbid (A) 05/28/2025    SGUA 1.016 05/28/2025    PROTEINUA 2+ (A) 05/28/2025    KETONESUA Negative 05/28/2025    LEUKOCYTESUR 250 (A) 05/28/2025    RBCUA 0-5 05/28/2025    WBCUA 51-99 (A) 05/28/2025    BACTERIA Occasional (A) 05/28/2025    SQEPUA Occasional (A) 05/28/2025    HYALINECASTS None Seen 05/28/2025    CREATRANDUR 28.8 (L) 04/21/2025        Impression  Acute kidney injury   Metabolic alkalosis  Cirrhosis of the liver   Thrombocytopenia   Anemia  Atrial fibrillation   Coronary artery disease  Heart failure with severely reduced ejection fraction   Pulmonary hypertension   Dyslipidemia   Peripheral vascular disease   COPD   Adrenal insufficiency   Alcohol/tobacco abuse    Plan  Acute kidney injury is likely a result of aggressive diuresis. Metabolic alkalosis persistent, suspect likely secondary to volume contraction.   Recommend holding diuretics today and giving dose of 250mg diamox IV for the metabolic alkalosis   Continue with potassium replacement, utilizing potassium chloride rather than potassium bicarbonate given concurrent metabolic alkalosis.        Adeline May MD  [facility]

## 2025-06-02 NOTE — PLAN OF CARE
Problem: Physical Therapy  Goal: Physical Therapy Goal  Description: Goals to be met by: Discharge      Patient will increase functional independence with mobility by performing:    New goals established due to functional decline and upgrade to ICU    1. Improve B LE MMT to 3+/5  2. Rolling side to side to be completed at MIN A  3. Supine<>Sit to be completed at MIN A  4. Sit to stands to be assessed and completed once able.     6/2/2025 1235 by Shirlye Curiel, PT  Outcome: Ongoing

## 2025-06-02 NOTE — PROGRESS NOTES
Gastroenterology/Hepatology Progress Note    Patient Name: Ran Reyes Jr.  Age: 69 y.o.  : 1955  MRN: [id number]  Admission Date: 2025      Self, Aaareferral    SUBJECTIVE:   Over the weekend transferred to the ICU for confusion and hypoxia.  Mentation appeared to be waxing and waning.  Started on lactulose.  Mentation improved.  Still requiring NRB    One brown stool today.  Not eating but drinking ensure.  Appetite is poor.  Says he feels better from yesterday.         Review of patient's allergies indicates:  No Known Allergies       INPATIENT MEDICATIONS    Scheduled Meds:   atorvastatin  80 mg Oral Daily    clopidogreL  75 mg Oral Daily    epoetin deep-ebpx (RETACRIT) injection  10,000 Units Subcutaneous Q7 Days    folic acid  1 mg Oral Daily    lactulose  30 g Oral TID    multivitamin  1 tablet Oral Daily    mupirocin   Nasal BID    pantoprazole  40 mg Oral Daily    potassium chloride  10 mEq Intravenous Q1H    potassium chloride  40 mEq Oral Once    QUEtiapine  25 mg Oral QHS    sertraline  50 mg Oral Daily    thiamine  500 mg Oral Daily    vitamin D  1,000 Units Oral Daily     Continuous Infusions:   NORepinephrine bitartrate-D5W  0-3 mcg/kg/min Intravenous Continuous 9.2 mL/hr at 25 1641 0.03 mcg/kg/min at 25 1641     PRN Meds:    Current Facility-Administered Medications:     albuterol-ipratropium, 3 mL, Nebulization, Q4H PRN    dextrose 50%, 12.5 g, Intravenous, PRN    dextrose 50%, 25 g, Intravenous, PRN    glucagon (human recombinant), 1 mg, Intramuscular, PRN    glucose, 16 g, Oral, PRN    glucose, 24 g, Oral, PRN    insulin aspart U-100, 0-5 Units, Subcutaneous, QID (AC + HS) PRN    melatonin, 6 mg, Oral, Nightly PRN    naloxone, 0.02 mg, Intravenous, PRN    sodium chloride 0.9%, 10 mL, Intravenous, Q12H PRN          Review of Systems:       Review of Systems   Constitutional:  Positive for activity change, appetite change and fatigue. Negative for unexpected weight change.    HENT:  Negative for trouble swallowing.    Respiratory:  Positive for shortness of breath.    Cardiovascular:  Positive for leg swelling.   Gastrointestinal:  Negative for abdominal pain, blood in stool, change in bowel habit, constipation and diarrhea.   Musculoskeletal: Negative.    Neurological: Negative.    Hematological: Negative.    Psychiatric/Behavioral: Negative.     All other systems reviewed and are negative.         Objective:       VITAL SIGNS: 24 HR MIN & MAX LAST    Temp  Min: 97.4 °F (36.3 °C)  Max: 98.1 °F (36.7 °C)  98.1 °F (36.7 °C)        BP  Min: 68/38  Max: 126/69  (!) 84/69     Pulse  Min: 48  Max: 93  68     Resp  Min: 14  Max: 30  (!) 25    SpO2  Min: 81 %  Max: 100 %  96 %        Physical Exam  Vitals reviewed.   Constitutional:       Appearance: He is ill-appearing.   HENT:      Head: Normocephalic and atraumatic.      Mouth/Throat:      Mouth: Mucous membranes are moist.   Eyes:      Extraocular Movements: Extraocular movements intact.      Conjunctiva/sclera: Conjunctivae normal.   Cardiovascular:      Rate and Rhythm: Normal rate and regular rhythm.   Pulmonary:      Effort: Accessory muscle usage present.      Breath sounds: Rhonchi present.   Abdominal:      General: Bowel sounds are normal.      Palpations: Abdomen is soft.      Tenderness: There is no abdominal tenderness.   Musculoskeletal:      Cervical back: Normal range of motion.      Right lower le+ Pitting Edema present.      Left lower le+ Pitting Edema present.   Skin:     General: Skin is warm and dry.   Neurological:      General: No focal deficit present.      Mental Status: He is oriented to person, place, and time. He is lethargic.   Psychiatric:         Mood and Affect: Mood normal.         Behavior: Behavior normal.              LABS:    Recent Labs   Lab 25  0421 25  0329 25  0531 25  2236 25  0206   WBC 4.75 5.68 6.01 6.34 6.66   HGB 8.0* 8.7* 9.5* 9.9* 9.7*   HCT 24.3* 26.6*  29.3* 30.7* 30.3*   PLT 70* 70* 89* 57* 56*   MCV 76.7* 78.0* 77.9* 79.1* 78.9*       Recent Labs   Lab 05/27/25  0351 05/28/25 0435 05/29/25 0422 05/30/25 0421 05/31/25 0329 06/01/25 0531 06/01/25 2236 06/02/25  0206   HGB 8.1* 8.2* 7.8* 8.0* 8.7* 9.5* 9.9* 9.7*   HCT 24.1* 24.8* 23.7* 24.3* 26.6* 29.3* 30.7* 30.3*        Recent Labs   Lab 05/28/25 0435 05/29/25 0421 05/31/25  1650 06/01/25 0531 06/01/25 2236 06/02/25  0206 06/02/25  1612   *   < > 137 137 141 139 141   K 5.1   < > 3.9 3.6 3.0* 2.9* 2.9*   CO2 21*   < > 31 31 35* 37* 38*   BUN 60.0*   < > 68.2* 73.6* 73.8* 77.6* 74.9*   CREATININE 1.86*   < > 2.01* 2.02* 2.06* 2.12* 1.90*   BILITOT 5.8*  5.8*   < > 5.3* 5.1* 5.0* 5.0*  --    BILIDIR 3.7*  --   --   --   --   --   --    IBILI 2.10*  --   --   --   --   --   --    ALKPHOS 383*   < > 426* 423* 416* 399*  --    AST 66*   < > 140* 145* 126* 122*  --    ALT 61*   < > 119* 126* 119* 116*  --    ALBUMIN 3.4   < > 3.3* 3.2* 3.0* 3.0*  --     < > = values in this interval not displayed.        Recent Labs   Lab 05/28/25  0857 05/31/25 0329 06/01/25 2238   INR 3.4* 1.9* 1.2   PROTIME 35.2* 22.4* 14.9*        Recent Labs   Lab 05/28/25  1042   IRON 34*   FERRITIN 190.16            IMAGING:   Echo Saline Bubble? No  Result Date: 6/2/2025    Left Ventricle: The left ventricle is moderately dilated. LVEDD 6.6 cm. There is severely reduced systolic function with a visually estimated ejection fraction of 20 - 25%. There is diastolic dysfunction.   Right Ventricle: Systolic function is mildly reduced.   Mitral Valve: There is moderate (2+) regurgitation.   Tricuspid Valve: There is mild to moderate (1-2+)regurgitation.   Pulmonary Artery: The estimated pulmonary artery systolic pressure is 53 mmHg.     CTA Chest Non-Coronary (PE Studies)  Result Date: 6/2/2025  Technique: CT Scan of the chest was performed with intravenous contrast with direct axial images as well as sagittal and coronal  reconstruction images pulmonary embolus protocol.  MIP images are also performed Dosage Information: Automated Exposure Control was utilized 1420.75 mGy.cm. Comparison: Comparison is with study dated May 3, 2025 13:18:10. Clinical History: Pe suspected. Findings: Mediastinum: Multiple subcentimeter mediastinal lymph nodes are seen paratracheal subcarinal and bilateral hilar spaces . Heart: Cardiomegaly is seen.  Coronary artery calcification is seen. Aorta: No aortic dissection or aneurysm is seen. Moderate aortic calcification is seen in the thoracic aorta. Tortuousity thoracic aorta. Pulmonary Arteries: No filling defects are seen in the pulmonary arteries to suggest pulmonary embolus. Lungs: There are stable right greater than the left bilateral pleural effusions with adjacent atelectasis. There is interval increase in ground glass opacity in both lungs with more prominent interlobular septal thickening, more pronounced in the bilateral upper lobes. Bony Structures: Spine: Mild spondylolytic changes are seen in the thoracic spine. Ribs: The visualized ribs appear unremarkable. Abdomen: The visualized upper abdominal organs appear unremarkable.     Impression: 1.  Cardiomegaly. 2. No filling defects are seen in the pulmonary arteries to suggest pulmonary embolus. 3. There are stable right greater than the left bilateral pleural effusions with adjacent atelectasis. There is interval increase in ground glass opacity in both lungs with more prominent interlobular septal thickening, more pronounced in the bilateral upper lobes. This may represent congestive changes with an infectious element not entirely excluded. Correlate with clinical and laboratory findings as regards further evaluation and follow-up. 4. Details and other findings as discussed above. No significant discrepancy with overnight report. Electronically signed by: Mickey West Date:    06/02/2025 Time:    08:18    CT Head Without Contrast  Result Date:  6/2/2025  EXAMINATION: CT HEAD WITHOUT CONTRAST CLINICAL HISTORY: Mental status change, unknown cause; TECHNIQUE: CT imaging of the head performed from the skull base to the vertex without intravenous contrast.  DLP 1421 mGycm. Automatic exposure control, adjustment of mA/kV or iterative reconstruction technique was used to reduce radiation. COMPARISON: 13 May 2025 FINDINGS: There is no acute cortical infarct, hemorrhage or mass lesion.  No new parenchymal attenuation abnormality.  Ventricular size is stable.  There are vascular calcifications. Visualized paranasal sinuses and mastoid air cells are clear.     No acute intracranial findings. No significant discrepancy with the preliminary report. Electronically signed by: Jerald Stephens Date:    06/02/2025 Time:    07:32    X-Ray Chest 1 View  Result Date: 6/1/2025  EXAMINATION: XR CHEST 1 VIEW CLINICAL HISTORY: Sob; TECHNIQUE: Single frontal view of the chest was performed. COMPARISON: 05/23/2025 FINDINGS: There is blunting of the right costophrenic angle cannot rule out small effusion there increased markings bilaterally most consistent with pulmonary edema.  Heart is enlarged.     Changes most consistent with pulmonary edema Electronically signed by: Kirill Zavala MD Date:    06/01/2025 Time:    22:23    US Retroperitoneal Complete  Result Date: 5/28/2025  EXAMINATION: US RETROPERITONEAL COMPLETE CLINICAL HISTORY: ELIZABETH;   weakness TECHNIQUE: Ultrasound of the kidneys and urinary bladder was performed including color flow and grayscale evaluation of the kidneys. COMPARISON: CT chest abdomen pelvis 05/03/2025 FINDINGS: RIGHT KIDNEY: The right kidney measures 10.5 cm.  No hydronephrosis. LEFT KIDNEY: The left kidney measures 9.2 cm. No hydronephrosis.  Lobulated contour. BLADDER: Bladder volume calculated 325 mL. OTHER: Aorta and IVC not imaged.     Normal sized, nonobstructed kidneys. Electronically signed by: Sailaja Atkinson Date:    05/28/2025 Time:    14:55    US  Abdomen Limited  Result Date: 5/28/2025  EXAMINATION: US ABDOMEN LIMITED CLINICAL HISTORY: Liver cirrhosis; TECHNIQUE: Limited ultrasound of the right upper quadrant of the abdomen was performed. COMPARISON: CT chest abdomen pelvis 05/03/2025 FINDINGS: LIVER: 20 cm cranial caudal at the midclavicular line. Nodular surface contour.  No focal mass appreciable. Portal vein is patent with appropriate directional flow.  Biphasic flow at the portal vein as can be seen with heart failure or portal hypertension. PANCREAS: Obscured by overlying bowel gas. GALLBLADDER: Gallbladder is contracted.  No appreciable shadowing gallstone. BILE DUCTS: 3 mm common bile duct.  Distal common bile duct is obscured by shadowing bowel gas. INFERIOR VENA CAVA: Normal in appearance. RIGHT KIDNEY: Images of the right kidney were performed on concurrent retroperitoneal sonogram.  See separate report. OTHER: Small volume perihepatic free fluid.     Hepatomegaly.  Nodular hepatic surface contour Small volume perihepatic free fluid Biphasic flow at the portal vein as can be seen with heart failure or portal hypertension Electronically signed by: Sailaja Atkinson Date:    05/28/2025 Time:    14:54    X-Ray Chest 1 View  Result Date: 5/23/2025  EXAMINATION XR CHEST 1 VIEW CLINICAL HISTORY weakness; TECHNIQUE A total of 1 frontal image(s) submitted of the chest. COMPARISON 4 May 2025 FINDINGS Lines/tubes/devices: ECG leads overlie the imaged region.  Right PICC no longer visualized. There is similar enlargement the cardiac silhouette, central vascular congestion, and widespread lung interstitial changes.  The trachea is midline. No new or worsening consolidation is developed in the interval.  There is no large pleural effusion or convincing pneumothorax. Regional osseous structures and extrathoracic soft tissues are similar. IMPRESSION No acute process or other adverse interval change.  Chronic secondary findings discussed above. Electronically  signed by: Ramana Mcleod Date:    05/23/2025 Time:    19:54    CT Head Without Contrast  Result Date: 5/13/2025  EXAMINATION: CT HEAD WITHOUT CONTRAST INDICATION: Mental status change, unknown cause; TECHNIQUE: Contiguous axial images are acquired through the head without IV contrast.  These images were reconstructed into the coronal and sagittal plane.  Automated exposure control, dose radiation lowering technique was utilized. COMPARISON: Head CT from 12/05/2024 FINDINGS: Hemorrhage: No acute intracranial hemorrhage is seen. Brain parenchyma: Subtle stable appearing microvascular change is seen in portions of the periventricular and deep white matter tracts. Cerebellum: Unremarkable. Vascular: Unremarkable venous sinuses. Sella and skull base: The sella appears to be within normal limits for age. Intracranial calcifications: Incidental note is made of bilateral choroid plexus calcification. Incidental note is made of some pineal region calcification. Incidental note is made of some calcification of the falx. Calvarium: No acute linear or depressed skull fracture is seen. Maxillofacial Structures: Paranasal sinuses: The visualized paranasal sinuses appear clear with no mucoperiosteal thickening or air fluid levels identified. Orbits: The orbits appear unremarkable. Zygomatic arches: The zygomatic arches are intact and unremarkable. Temporal bones and mastoids: The temporal bones and mastoids appear unremarkable. TMJ: The mandibular condyles appear normally placed with respect to the mandibular fossa. Nasal Bones: No displaced nasal bone fracture is seen. Visualized upper cervical spine: The visualized cervical spine appears unremarkable.     1. No evidence of acute intracranial abnormality. [facility] Electronically signed by: Harris Chaudhry MD Date:    05/13/2025 Time:    07:45    Echo  Result Date: 5/9/2025    Mitral Valve: There is severe regurgitation with a centrally directed jet.     X-Ray Chest  1 View  Result Date: 5/5/2025  EXAMINATION: XR CHEST 1 VIEW CLINICAL HISTORY: SOB; TECHNIQUE: Single frontal view of the chest was performed. COMPARISON: 04/30/2025 FINDINGS: PICC line remains in place.  There is cephalization of blood flow.  Heart is enlarged.  There is mild blunting of both costophrenic angles cannot rule out small effusions.  There is vascular calcification noted.     Changes most consistent with pulmonary edema Electronically signed by: Kirill Zavala MD Date:    05/05/2025 Time:    06:58        Assessment / Plan:     Ran Reyes Jr. is a 69 y.o. male with nonischmic CM (EF 22%), diastolic HF with moderate Pulm HTN (PASP 59mmHg), pAF on Xarelto, PAD s/p stent on plavix, COPD, tobacco use admitted for need for nursing home placement.    GI consulted for concern for liver disease.      Likely cirrhosis - cardiac vs alcohol vs multifactorial  No biliary concern at this time - gallbladder contracted, CBD 3 mm  Likely does have a component of congestive hepatopathy Hyponatremia resolved - combination of beer use, liver and cardiac disease     - MELD-Na: 24 (improved - driven by renal function, TB)  - CTP: C   - INR was 3.4 - recommended holding due to supratherapeutic level and uptrending.  INR now 1.2, has not been restarted, but shows that liver synthetic function is good and INR elevation due to DOAC  - OK to resume anticoagulation from GI standpoint.  - Needs complete alcohol cessation  - PETH negative  - Treat underlying cardiac issues - diuresis/volume management per nephrology and cardiology     Ascites: none  Encephalopathy: waxing and waning mentation over the weekend.  Now on lactulose - titrate to 2-3 Bms a day.  Alert and oriented x 4 today  Varices: None on EGD in March 2024  HCC screening: US without lesion.  AFP 4.8     2. Acute on chronic KI  - Has been getting diuresis since admission - management by nephrology  - Creatinine uptrended and now with volume contraction along with  hypoxia       3. Anemia, MCV low  - Ferritin borderline in the past (30s)  - No overt bleeding  - On plavix and xarelto - INR 1.2  - No prior colonoscopy  - Prior EGD with inflammation - on ppi  - Ferritin 190 now but likely mixed picture of NANDO and AoCD.    - Recommend iron supplementation                 Simran Arzate MD, MPH  Gastroenterology and Hepatology   of Clinical Medicine  [facility]

## 2025-06-02 NOTE — PROGRESS NOTES
06/02/25 0728   Missed Time Reason   Missed Treatment Reason Transfer to ICU, await clearance     Reconsult OT as appropriate

## 2025-06-02 NOTE — PT/OT/SLP PROGRESS
Physical Therapy    Missed Treatment Session - cancel note - 06/02/2025    Patient Name:  Ran Reyes Jr.   MRN:  [id number]      Patient not seen at this time secondary to Transfer to ICU, await clearance.    Will follow-up as patient is appropriate/available/agreeable to participate and as therapists' schedule allows.

## 2025-06-02 NOTE — NURSING TRANSFER
Nursing Transfer Note      6/2/2025   3:46 AM    Nurse giving handoff:BARRY ALFARO LPN  Nurse receiving handoff:ANGIE BRAGG    Reason patient is being transferred: HIGHER LEVEL OF CARE    Transfer ICU FROM [facility]    Transfer via BED    Transfer with OXYGEN ,PORT TELE MONITOR     Transported by NURSE AND HOSPITAL STAFF    Transfer VitALS  Heart Rate:  O2  Temperature:  Respirations:    Telemetry:   Order for Tele Monitor yes    Additional Lines:     Medicines sent:     Any special needs or follow-up needed:     Patient belongings transferred with patient: yes    Chart send with patient: no    Notified by doctor: daughter    Patient reassessed at    Upon arrival to floor: cardiac monitor applied, patient oriented to room, call bell in reach, and bed in lowest position

## 2025-06-02 NOTE — NURSING
ON CALL NOTIFIED OF PATIENT 'S LOW P02 AT 52 , PATIENT IS GROGGY BUT IS AROUSES TO VOICE  ,CONFUSE AS TO WHO HE IS TIME OR PLACE . RESP LABORED , NAIL BEDS ARE CYANOTIC , OXYGEN AT 97 % PATIENT IS ON A NON RE BREATHER MASK , DOCTORS AT BEDSIDE TO EVALUATE PATIENT , PATIENT GAVE MEDS AS ORDERED , STAT CHEST XRAY DONE AT BEDSIDE . RESP THERAPY AT BEDSIDE

## 2025-06-02 NOTE — H&P
[facility] - ICU  Pulmonary Critical Care Note    Patient Name: Ran Reyes Jr.  MRN: [id number]  Admission Date: 5/23/2025  Hospital Length of Stay: 9 days  Code Status: Full Code  Attending Provider: Merle Sun MD  Primary Care Provider: Abiodun Recinos DO     Subjective:     HPI:   This is a 69 year old male who was initially admitted on 05/23/2025  for generalized weakness, fatigue and bilateral lower extremity edema.  He is also requesting nursing home placement as he has no family assistance.  Of note, patient was recently discharged at [facility] last 05/20/2025 after being treated for cardiogenic shock, adrenal insufficiency.  Since admission, patient is being treated for encephalopathy likely secondary to alcohol use disorder, decompensated heart failure with reduced ejection fraction, and acute kidney injury.  Patient given IV diuresis as blood pressure allows, prednisone taper was continued for adrenal insufficiency, gastroenterology was consulted for cirrhosis (cardiac versus alcohol versus multifactorial) and due to prolonged INR initially rivaroxaban was withheld, nephrology was on board for acute on chronic kidney disease.    Hospital Course/Significant events:  05/23/2025: Admitted at hospital medicine   06/02/2025:  Upgraded to ICU    24 Hour Interval History:  Patient was noted to be more drowsy with intermittent episodes of confusion and had labored abdominal breathing with RR of 34, initial ABG with alkalosis and hypoxemia down to PO2 of 41.  Patient was then transitioned from nasal cannula to Ventimask with saturation improving more than 90%.  Auscultation revealed bibasilar crackles.  Examination with bilateral pitting edema.  Patient was then given 2 mg of IV Bumex for diuresis.  CXR revealed changes consistent with pulmonary edema.  Due to tenuous respiratory status, patient was upgraded to ICU with very low threshold for intubation.    Past Medical History:   Diagnosis Date    A-fib      Anticoagulant long-term use     Anxiety     Aortic aneurysm     Cataract     CHF (congestive heart failure)     Chronic atrial fibrillation     COPD (chronic obstructive pulmonary disease)     Coronary artery disease     Depression     HLD (hyperlipidemia)     Hypertension     Mitral regurgitation     PAD (peripheral artery disease)     Primary open angle glaucoma (POAG)        Past Surgical History:   Procedure Laterality Date    ANGIOGRAM, CORONARY, WITH LEFT HEART CATHETERIZATION N/A 03/26/2024    Procedure: Angiogram, Coronary, with Left Heart Cath;  Surgeon: Adriano Montiel MD;  Location: [facility] CATH LAB;  Service: Cardiology;  Laterality: N/A;    ATHERECTOMY OF PERIPHERAL VESSEL Left 09/12/2022    LEFT SFA ATHERECTOMY, BALLOON ANGIOPLASTY    CATARACT EXTRACTION W/  INTRAOCULAR LENS IMPLANT Left 03/09/2023    Procedure: EXTRACTION, CATARACT, WITH IOL INSERTION;  Surgeon: Georgi Borja MD;  Location: [facility];  Service: Ophthalmology;  Laterality: Left;  19.5  mac    EGD, WITH CLOSED BIOPSY  03/22/2024    Procedure: EGD, WITH CLOSED BIOPSY;  Surgeon: Ronald Calderon MD;  Location: [facility] ENDOSCOPY;  Service: Gastroenterology;;    ESOPHAGOGASTRODUODENOSCOPY N/A 03/22/2024    Procedure: EGD;  Surgeon: Ronald Calderon MD;  Location: [facility] ENDOSCOPY;  Service: Gastroenterology;  Laterality: N/A;    Heart Stent N/A     > 10yrs. AGO    INCISION AND DRAINAGE N/A 03/18/2024    Procedure: Incision and Drainage;  Surgeon: Fahad Rivas MD;  Location: [facility];  Service: Urology;  Laterality: N/A;  I&D SCROTAL ABSCESS    INSERTION OF STENT INTO PERIPHERAL VESSEL Right 10/17/2022    RIGHT SFA ATHERECTOMY, BALLOON ANGIOPLASTY, STENT; RIGHT ANTERIOR TIBIAL ARTERY ATHERECTOMY, BALLOON ANGIOPLASTY    ORCHIECTOMY Left 03/18/2024    Procedure: ORCHIECTOMY;  Surgeon: Fahad Rivas MD;  Location: [facility];  Service: Urology;  Laterality: Left;       Social History[1]        Current Outpatient  Medications   Medication Instructions    acetaminophen 650 mg, 2 times daily PRN    albuterol (PROVENTIL/VENTOLIN HFA) 90 mcg/actuation inhaler 2 puffs, Inhalation, Every 6 hours PRN, Rescue    albuterol-ipratropium (DUO-NEB) 2.5 mg-0.5 mg/3 mL nebulizer solution 3 mLs, Nebulization, Every 6 hours PRN, Rescue    atorvastatin (LIPITOR) 80 mg, Oral, Daily    BREZTRI AEROSPHERE 160-9-4.8 mcg/actuation HFAA 2 puffs, 2 times daily    bumetanide (BUMEX) 1 mg, Oral, Daily PRN    clopidogreL (PLAVIX) 75 mg, Oral, Daily    diphenhydrAMINE (BENADRYL) 25 mg, Oral, Every 6 hours PRN    folic acid (FOLVITE) 1 mg, Oral, Daily    gabapentin (NEURONTIN) 300 mg, Oral, 3 times daily    olopatadine (PATANOL) 0.1 % ophthalmic solution Apply to eye.    pantoprazole (PROTONIX) 40 mg, Oral, Daily    potassium chloride SA (K-DUR,KLOR-CON) 20 MEQ tablet 20 mEq, Oral, 2 times daily    predniSONE (DELTASONE) 5 MG tablet Take 4 tablets (20 mg total) by mouth 2 (two) times daily for 7 days, THEN 3 tablets (15 mg total) 2 (two) times daily for 7 days, THEN 2 tablets (10 mg total) 2 (two) times daily for 7 days, THEN 1 tablet (5 mg total) 2 (two) times daily for 7 days.    QUEtiapine (SEROQUEL) 100 mg, Oral, Nightly    rivaroxaban (XARELTO) 20 mg, Oral, Daily    senna-docusate (PERICOLACE) 8.6-50 mg per tablet 1 tablet, Oral, 2 times daily PRN    sertraline (ZOLOFT) 100 mg, Oral, Daily    sulfamethoxazole-trimethoprim 800-160mg (BACTRIM DS) 800-160 mg Tab 1 tablet, Oral, 2 times daily    [Paused] urea (CARMOL) 40 % Crea Apply topically.    [Paused] varenicline tartrate (CHANTIX) 0.5 mg    vitamin D (VITAMIN D3) 1,000 Units, Daily       Review of patient's allergies indicates:  No Known Allergies     Current Inpatient Medications   atorvastatin  80 mg Oral Daily    clopidogreL  75 mg Oral Daily    epoetin deep-ebpx (RETACRIT) injection  10,000 Units Subcutaneous Q7 Days    folic acid  1 mg Oral Daily    lactulose  30 g Oral TID    multivitamin  1  tablet Oral Daily    mupirocin   Nasal BID    pantoprazole  40 mg Oral Daily    potassium bicarbonate  50 mEq Oral Once    predniSONE  10 mg Oral BID    Followed by    [START ON 6/9/2025] predniSONE  5 mg Oral BID    QUEtiapine  25 mg Oral QHS    sertraline  50 mg Oral Daily    thiamine  500 mg Oral Daily    vitamin D  1,000 Units Oral Daily       Current Intravenous Infusions        Review of Systems   Unable to perform ROS: Acuity of condition          Objective:       Intake/Output Summary (Last 24 hours) at 6/2/2025 0107  Last data filed at 6/1/2025 1845  Gross per 24 hour   Intake 1542 ml   Output 1750 ml   Net -208 ml         Vital Signs (Most Recent):  Temp: 98 °F (36.7 °C) (06/01/25 1936)  Pulse: (!) 58 (06/02/25 0010)  Resp: (!) 23 (06/02/25 0010)  BP: 101/71 (06/02/25 0010)  SpO2: 97 % (06/02/25 0010)  Body mass index is 25.33 kg/m².  Weight: 84.7 kg (186 lb 11.7 oz) Vital Signs (24h Range):  Temp:  [97.6 °F (36.4 °C)-98.5 °F (36.9 °C)] 98 °F (36.7 °C)  Pulse:  [51-89] 58  Resp:  [16-23] 23  SpO2:  [89 %-100 %] 97 %  BP: ()/(49-78) 101/71     Physical Exam  Constitutional:       General: He is in acute distress.      Appearance: He is ill-appearing and toxic-appearing.   Pulmonary:      Breath sounds: Rales present.   Abdominal:      General: There is distension.   Musculoskeletal:      Right lower leg: Edema present.      Left lower leg: Edema present.   Neurological:      Comments: Drowsy with occasional incomprehensible words however oriented to self, time, place and president.  Able to move all extremities spontaneously, follows commands such as squeezing hands and wiggling toes.  GCS 12.           Lines/Drains/Airways       Peripheral Intravenous Line  Duration                  Peripheral IV - Single Lumen 05/23/25 1838 20 G Left;Posterior Hand 9 days         Peripheral IV - Single Lumen 06/02/25 0015 18 G Anterior;Proximal;Right Upper Arm <1 day                    Significant Labs:    Lab  Results   Component Value Date    WBC 6.34 06/01/2025    HGB 9.9 (L) 06/01/2025    HCT 30.7 (L) 06/01/2025    MCV 79.1 (L) 06/01/2025    PLT 57 (L) 06/01/2025           BMP  Lab Results   Component Value Date     06/01/2025    K 3.0 (L) 06/01/2025    CO2 35 (H) 06/01/2025    BUN 73.8 (H) 06/01/2025    CREATININE 2.06 (H) 06/01/2025    CALCIUM 9.6 06/01/2025    AGAP 16.0 06/01/2025    EGFRNONAA 88 (L) 12/06/2021         ABG  Recent Labs   Lab 06/01/25  2319   PH 7.570*   PO2 73.0*   PCO2 46.0*   HCO3 42.1*   POCBASEDEF 18.10*       Mechanical Ventilation Support:  Oxygen Concentration (%): 100 (06/01/25 7896)      Significant Imaging:  I have reviewed the pertinent imaging within the past 24 hours.        Assessment/Plan:     Assessment  Acute hypoxic respiratory failure likely secondary to decompensated heart failure  CXR 6/1/25:  Increased pulmonary markings bilaterally most consistent with pulmonary edema  Cirrhosis with hepatic encephalopathy   Elevated ammonia  Transaminitis  Thrombocytopenia  Decompensated heart failure with reduced ejection fraction 22%  Echo 04/30/2025:  Severe global hypokinesis with severely reduced systolic function EF of 22%, grade 3 diastolic dysfunction  Asymptomatic bacteriuria   Urine culture with chronic growth of ESBL E coli  Adrenal insufficiency on steroid taper  Acute on chronic kidney disease  Hyponatremia-improved  Hypokalemia  Chronic microcytic anemia  Iron-deficiency  Worsening thrombocytopenia  Atrial fibrillation  Nonobstructive epicardial coronary artery disease  Elevated troponin  Chronic obstructive pulmonary disease  Peripheral artery disease   Hypercholesterolemia  Hypertension  Anxiety/insomnia  Bedbug infestation    Plan  Admit to ICU for closer monitoring, low threshold for intubation  CT head to assess altered mental status  CTA to rule out pulmonary embolism in the setting of hypoxemia and currently not on VTE prophylaxis (previously on Xarelto but was  stopped due to prolonged INR)  Continue oxygen supplementation to maintain saturation 88-92%  Given 2 mg of IV Bumex, will likely need further diuresis in the morning  Repeat echocardiogram pending  Beta-blocker withheld due to bradycardia and needs blood pressure room for diuresis  Start lactulose 20 g t.i.d., titrate accordingly with goal BM 2 to 3 times a day  Monitor and replete electrolytes accordingly  Continue prednisone taper  Need to discuss with GI resumption of oral anticoagulant  Taper off medications contributing to somnolence  Thrombocytopenia acutely worsening, evaluate for DIC  Hold initiation of empiric antibiotics for now, reassess in the morning (normal wbc count, stable BP, afebrile) in the setting of previous cultures with multi-drug resistant E coli      DVT Prophylaxis: Previously on Xarelto, consider restarting in setting of Afib and INR normalizing  GI Prophylaxis: Protonix     32 minutes of critical care was time spent personally by me on the following activities: development of treatment plan with patient or surrogate and bedside caregivers, discussions with consultants, evaluation of patient's response to treatment, examination of patient, ordering and performing treatments and interventions, ordering and review of laboratory studies, ordering and review of radiographic studies, pulse oximetry, re-evaluation of patient's condition.  This critical care time did not overlap with that of any other provider or involve time for any procedures.     Yousif Keating MD  Pulmonary Critical Care Medicine  [facility] - ICU  DOS: 06/02/2025           [1]   Social History  Socioeconomic History    Marital status: Single   Tobacco Use    Smoking status: Every Day     Current packs/day: 0.50     Average packs/day: 0.8 packs/day for 50.4 years (41.0 ttl pk-yrs)     Types: Cigarettes     Start date: 1975     Passive exposure: Current    Smokeless tobacco: Never    Tobacco comments:     States smoke  2-3 cigarettes per day   Substance and Sexual Activity    Alcohol use: Yes     Alcohol/week: 3.0 standard drinks of alcohol     Types: 3 Cans of beer per week     Comment: socially    Drug use: Not Currently     Frequency: 1.0 times per week     Types: Marijuana     Comment: no use in over 1 year    Sexual activity: Not Currently     Partners: Female     Social Drivers of Health     Financial Resource Strain: Low Risk  (5/24/2025)    Overall Financial Resource Strain (CARDIA)     Difficulty of Paying Living Expenses: Not hard at all   Food Insecurity: No Food Insecurity (5/24/2025)    Hunger Vital Sign     Worried About Running Out of Food in the Last Year: Never true     Ran Out of Food in the Last Year: Never true   Transportation Needs: No Transportation Needs (5/24/2025)    PRAPARE - Transportation     Lack of Transportation (Medical): No     Lack of Transportation (Non-Medical): No   Physical Activity: Inactive (5/24/2025)    Exercise Vital Sign     Days of Exercise per Week: 0 days     Minutes of Exercise per Session: 0 min   Stress: No Stress Concern Present (5/24/2025)    [employer] - Occupational Stress Questionnaire     Feeling of Stress : Not at all   Housing Stability: Low Risk  (5/24/2025)    Housing Stability Vital Sign     Unable to Pay for Housing in the Last Year: No     Homeless in the Last Year: No

## 2025-06-03 LAB
A-ADO2 BLOOD GAS (OHS): 446 MMHG
A-ADO2 BLOOD GAS (OHS): 578 MMHG
ALBUMIN SERPL-MCNC: 2.9 G/DL (ref 3.4–4.8)
ALBUMIN/GLOB SERPL: 0.7 RATIO (ref 1.1–2)
ALLENS TEST BLOOD GAS (OHS): YES
ALLENS TEST BLOOD GAS (OHS): YES
ALP SERPL-CCNC: 430 UNIT/L (ref 40–150)
ALT SERPL-CCNC: 107 UNIT/L (ref 0–55)
ANION GAP SERPL CALC-SCNC: 17 MEQ/L
AST SERPL-CCNC: 104 UNIT/L (ref 11–45)
BASE EXCESS BLD CALC-SCNC: 18.8 MMOL/L (ref -2–2)
BASE EXCESS BLD CALC-SCNC: 20 MMOL/L (ref -2–2)
BASOPHILS # BLD AUTO: 0.01 X10(3)/MCL
BASOPHILS NFR BLD AUTO: 0.1 %
BILIRUB SERPL-MCNC: 4.9 MG/DL
BIPAP(E) BLOOD GAS (OHS): 8 CM H2O
BIPAP(I) BLOOD GAS (OHS): 15 CM H2O
BLOOD GAS SAMPLE TYPE (OHS): ABNORMAL
BLOOD GAS SAMPLE TYPE (OHS): ABNORMAL
BUN SERPL-MCNC: 70.8 MG/DL (ref 8.4–25.7)
CALCIUM SERPL-MCNC: 10 MG/DL (ref 8.8–10)
CHLORIDE SERPL-SCNC: 90 MMOL/L (ref 98–107)
CO2 BLDA-SCNC: 44.4 MMOL/L (ref 22–26)
CO2 BLDA-SCNC: 45.8 MMOL/L (ref 22–26)
CO2 SERPL-SCNC: 35 MMOL/L (ref 23–31)
COHGB MFR BLDA: 3.2 % (ref 0.5–1.5)
COHGB MFR BLDA: 3.8 % (ref 0.5–1.5)
CREAT SERPL-MCNC: 1.94 MG/DL (ref 0.72–1.25)
CREAT/UREA NIT SERPL: 36
DRAWN BY BLOOD GAS (OHS): ABNORMAL
DRAWN BY BLOOD GAS (OHS): ABNORMAL
EOSINOPHIL # BLD AUTO: 0.06 X10(3)/MCL (ref 0–0.9)
EOSINOPHIL NFR BLD AUTO: 0.8 %
ERYTHROCYTE [DISTWIDTH] IN BLOOD BY AUTOMATED COUNT: 29.7 % (ref 11.5–17)
GAS PNL BLD: 539 MMHG
GAS PNL BLD: 662 MMHG
GFR SERPLBLD CREATININE-BSD FMLA CKD-EPI: 37 ML/MIN/1.73/M2
GLOBULIN SER-MCNC: 4 GM/DL (ref 2.4–3.5)
GLUCOSE SERPL-MCNC: 122 MG/DL (ref 82–115)
HCO3 BLDA-SCNC: 43.1 MMOL/L (ref 22–26)
HCO3 BLDA-SCNC: 44.1 MMOL/L (ref 22–26)
HCT VFR BLD AUTO: 32.4 % (ref 42–52)
HGB BLD-MCNC: 10.2 G/DL (ref 14–18)
HOLD SPECIMEN: NORMAL
HOLD SPECIMEN: NORMAL
IMM GRANULOCYTES # BLD AUTO: 0.05 X10(3)/MCL (ref 0–0.04)
IMM GRANULOCYTES NFR BLD AUTO: 0.7 %
INHALED O2 CONCENTRATION: 100 %
INHALED O2 CONCENTRATION: 85 %
LPM (OHS): 15
LYMPHOCYTES # BLD AUTO: 0.77 X10(3)/MCL (ref 0.6–4.6)
LYMPHOCYTES NFR BLD AUTO: 10.4 %
MAGNESIUM SERPL-MCNC: 2.1 MG/DL (ref 1.6–2.6)
MCH RBC QN AUTO: 25.6 PG (ref 27–31)
MCHC RBC AUTO-ENTMCNC: 31.5 G/DL (ref 33–36)
MCV RBC AUTO: 81.2 FL (ref 80–94)
METHGB MFR BLDA: 0.6 % (ref 0–1.5)
METHGB MFR BLDA: 0.8 % (ref 0–1.5)
MODE (OHS): ABNORMAL
MONOCYTES # BLD AUTO: 0.57 X10(3)/MCL (ref 0.1–1.3)
MONOCYTES NFR BLD AUTO: 7.7 %
NEUTROPHILS # BLD AUTO: 5.94 X10(3)/MCL (ref 2.1–9.2)
NEUTROPHILS NFR BLD AUTO: 80.3 %
NRBC BLD AUTO-RTO: 5.3 %
O2 HB BLOOD GAS (OHS): 90.7 % (ref 94–100)
O2 HB BLOOD GAS (OHS): 96 % (ref 94–100)
OXYGEN DEVICE BLOOD GAS (OHS): ABNORMAL
OXYHGB MFR BLDA: 10.4 G/DL (ref 12–18)
OXYHGB MFR BLDA: 11 G/DL (ref 12–18)
PCO2 BLDA: 41 MMHG (ref 35–45)
PCO2 BLDA: 54 MMHG (ref 35–45)
PH BLDA: 7.52 [PH] (ref 7.35–7.45)
PH BLDA: 7.63 [PH] (ref 7.35–7.45)
PHOSPHATE SERPL-MCNC: 3.3 MG/DL (ref 2.3–4.7)
PLATELET # BLD AUTO: 51 X10(3)/MCL (ref 130–400)
PLATELETS.RETICULATED NFR BLD AUTO: 6.9 % (ref 0.9–11.2)
PMV BLD AUTO: ABNORMAL FL
PO2 BLDA: 84 MMHG (ref 75–100)
PO2 BLDA: 93 MMHG (ref 75–100)
POTASSIUM SERPL-SCNC: 3.7 MMOL/L (ref 3.5–5.1)
PROT SERPL-MCNC: 6.9 GM/DL (ref 5.8–7.6)
RBC # BLD AUTO: 3.99 X10(6)/MCL (ref 4.7–6.1)
SAMPLE SITE BLOOD GAS (OHS): ABNORMAL
SAMPLE SITE BLOOD GAS (OHS): ABNORMAL
SAO2 % BLDA: 100.3 %
SAO2 % BLDA: 94.4 %
SODIUM SERPL-SCNC: 142 MMOL/L (ref 136–145)
WBC # BLD AUTO: 7.4 X10(3)/MCL (ref 4.5–11.5)

## 2025-06-03 PROCEDURE — 27100171 HC OXYGEN HIGH FLOW UP TO 24 HOURS

## 2025-06-03 PROCEDURE — 25000242 PHARM REV CODE 250 ALT 637 W/ HCPCS

## 2025-06-03 PROCEDURE — 25000003 PHARM REV CODE 250

## 2025-06-03 PROCEDURE — 25000003 PHARM REV CODE 250: Performed by: STUDENT IN AN ORGANIZED HEALTH CARE EDUCATION/TRAINING PROGRAM

## 2025-06-03 PROCEDURE — 83735 ASSAY OF MAGNESIUM: CPT

## 2025-06-03 PROCEDURE — 84100 ASSAY OF PHOSPHORUS: CPT

## 2025-06-03 PROCEDURE — 63700000 PHARM REV CODE 250 ALT 637 W/O HCPCS

## 2025-06-03 PROCEDURE — 94640 AIRWAY INHALATION TREATMENT: CPT

## 2025-06-03 PROCEDURE — 27000221 HC OXYGEN, UP TO 24 HOURS

## 2025-06-03 PROCEDURE — 27000190 HC CPAP FULL FACE MASK W/VALVE

## 2025-06-03 PROCEDURE — 99291 CRITICAL CARE FIRST HOUR: CPT | Mod: GC,,, | Performed by: INTERNAL MEDICINE

## 2025-06-03 PROCEDURE — 63600175 PHARM REV CODE 636 W HCPCS

## 2025-06-03 PROCEDURE — 94799 UNLISTED PULMONARY SVC/PX: CPT

## 2025-06-03 PROCEDURE — 99900035 HC TECH TIME PER 15 MIN (STAT)

## 2025-06-03 PROCEDURE — 5A09357 ASSISTANCE WITH RESPIRATORY VENTILATION, LESS THAN 24 CONSECUTIVE HOURS, CONTINUOUS POSITIVE AIRWAY PRESSURE: ICD-10-PCS | Performed by: INTERNAL MEDICINE

## 2025-06-03 PROCEDURE — 36415 COLL VENOUS BLD VENIPUNCTURE: CPT

## 2025-06-03 PROCEDURE — 94761 N-INVAS EAR/PLS OXIMETRY MLT: CPT

## 2025-06-03 PROCEDURE — 63600175 PHARM REV CODE 636 W HCPCS: Mod: JZ,TB | Performed by: INTERNAL MEDICINE

## 2025-06-03 PROCEDURE — 85025 COMPLETE CBC W/AUTO DIFF WBC: CPT

## 2025-06-03 PROCEDURE — 25000003 PHARM REV CODE 250: Performed by: INTERNAL MEDICINE

## 2025-06-03 PROCEDURE — 27000207 HC ISOLATION

## 2025-06-03 PROCEDURE — 36600 WITHDRAWAL OF ARTERIAL BLOOD: CPT

## 2025-06-03 PROCEDURE — 97110 THERAPEUTIC EXERCISES: CPT

## 2025-06-03 PROCEDURE — 5A0935A ASSISTANCE WITH RESPIRATORY VENTILATION, LESS THAN 24 CONSECUTIVE HOURS, HIGH NASAL FLOW/VELOCITY: ICD-10-PCS | Performed by: INTERNAL MEDICINE

## 2025-06-03 PROCEDURE — 63600175 PHARM REV CODE 636 W HCPCS: Mod: JZ,TB

## 2025-06-03 PROCEDURE — 82803 BLOOD GASES ANY COMBINATION: CPT

## 2025-06-03 PROCEDURE — 20000000 HC ICU ROOM

## 2025-06-03 PROCEDURE — 94660 CPAP INITIATION&MGMT: CPT

## 2025-06-03 PROCEDURE — 80053 COMPREHEN METABOLIC PANEL: CPT

## 2025-06-03 RX ORDER — POTASSIUM CHLORIDE 20 MEQ/1
40 TABLET, EXTENDED RELEASE ORAL ONCE
Status: COMPLETED | OUTPATIENT
Start: 2025-06-03 | End: 2025-06-03

## 2025-06-03 RX ORDER — FLUCONAZOLE 100 MG/1
200 TABLET ORAL ONCE
Status: COMPLETED | OUTPATIENT
Start: 2025-06-03 | End: 2025-06-03

## 2025-06-03 RX ORDER — FLUCONAZOLE 100 MG/1
100 TABLET ORAL DAILY
Status: COMPLETED | OUTPATIENT
Start: 2025-06-04 | End: 2025-06-05

## 2025-06-03 RX ORDER — NOREPINEPHRINE BITARTRATE/D5W 4MG/250ML
0-3 PLASTIC BAG, INJECTION (ML) INTRAVENOUS CONTINUOUS
Status: DISCONTINUED | OUTPATIENT
Start: 2025-06-03 | End: 2025-06-05 | Stop reason: HOSPADM

## 2025-06-03 RX ORDER — BUMETANIDE 0.25 MG/ML
2 INJECTION, SOLUTION INTRAMUSCULAR; INTRAVENOUS ONCE
Status: COMPLETED | OUTPATIENT
Start: 2025-06-03 | End: 2025-06-03

## 2025-06-03 RX ORDER — ACETAZOLAMIDE 500 MG/5ML
250 INJECTION, POWDER, LYOPHILIZED, FOR SOLUTION INTRAVENOUS ONCE
Status: COMPLETED | OUTPATIENT
Start: 2025-06-03 | End: 2025-06-03

## 2025-06-03 RX ORDER — HYDROMORPHONE HYDROCHLORIDE 1 MG/ML
0.2 INJECTION, SOLUTION INTRAMUSCULAR; INTRAVENOUS; SUBCUTANEOUS ONCE
Status: COMPLETED | OUTPATIENT
Start: 2025-06-03 | End: 2025-06-03

## 2025-06-03 RX ORDER — BUMETANIDE 0.25 MG/ML
4 INJECTION, SOLUTION INTRAMUSCULAR; INTRAVENOUS ONCE
Status: COMPLETED | OUTPATIENT
Start: 2025-06-03 | End: 2025-06-03

## 2025-06-03 RX ORDER — HYDROMORPHONE HYDROCHLORIDE 1 MG/ML
1 INJECTION, SOLUTION INTRAMUSCULAR; INTRAVENOUS; SUBCUTANEOUS ONCE
Status: COMPLETED | OUTPATIENT
Start: 2025-06-03 | End: 2025-06-03

## 2025-06-03 RX ADMIN — Medication 1000 UNITS: at 09:06

## 2025-06-03 RX ADMIN — THERA TABS 1 TABLET: TAB at 09:06

## 2025-06-03 RX ADMIN — CLOPIDOGREL BISULFATE 75 MG: 75 TABLET, FILM COATED ORAL at 09:06

## 2025-06-03 RX ADMIN — PANTOPRAZOLE SODIUM 40 MG: 40 TABLET, DELAYED RELEASE ORAL at 09:06

## 2025-06-03 RX ADMIN — ACETAZOLAMIDE 250 MG: 500 INJECTION, POWDER, LYOPHILIZED, FOR SOLUTION INTRAVENOUS at 08:06

## 2025-06-03 RX ADMIN — LACTULOSE 30 G: 20 SOLUTION ORAL at 03:06

## 2025-06-03 RX ADMIN — QUETIAPINE FUMARATE 25 MG: 25 TABLET ORAL at 08:06

## 2025-06-03 RX ADMIN — MUPIROCIN: 20 OINTMENT TOPICAL at 09:06

## 2025-06-03 RX ADMIN — LACTULOSE 30 G: 20 SOLUTION ORAL at 09:06

## 2025-06-03 RX ADMIN — THIAMINE HCL TAB 100 MG 500 MG: 100 TAB at 09:06

## 2025-06-03 RX ADMIN — Medication 6 MG: at 01:06

## 2025-06-03 RX ADMIN — IPRATROPIUM BROMIDE AND ALBUTEROL SULFATE 3 ML: 2.5; .5 SOLUTION RESPIRATORY (INHALATION) at 07:06

## 2025-06-03 RX ADMIN — NOREPINEPHRINE BITARTRATE 0.06 MCG/KG/MIN: 4 INJECTION, SOLUTION INTRAVENOUS at 02:06

## 2025-06-03 RX ADMIN — HYDROMORPHONE HYDROCHLORIDE 1 MG: 1 INJECTION, SOLUTION INTRAMUSCULAR; INTRAVENOUS; SUBCUTANEOUS at 08:06

## 2025-06-03 RX ADMIN — HYDROMORPHONE HYDROCHLORIDE 0.2 MG: 1 INJECTION, SOLUTION INTRAMUSCULAR; INTRAVENOUS; SUBCUTANEOUS at 03:06

## 2025-06-03 RX ADMIN — SERTRALINE HYDROCHLORIDE 50 MG: 50 TABLET ORAL at 09:06

## 2025-06-03 RX ADMIN — ATORVASTATIN CALCIUM 80 MG: 40 TABLET, FILM COATED ORAL at 10:06

## 2025-06-03 RX ADMIN — BUMETANIDE 4 MG: 0.25 INJECTION INTRAMUSCULAR; INTRAVENOUS at 11:06

## 2025-06-03 RX ADMIN — BUMETANIDE 2 MG: 0.25 INJECTION INTRAMUSCULAR; INTRAVENOUS at 03:06

## 2025-06-03 RX ADMIN — IPRATROPIUM BROMIDE AND ALBUTEROL SULFATE 3 ML: 2.5; .5 SOLUTION RESPIRATORY (INHALATION) at 01:06

## 2025-06-03 RX ADMIN — NOREPINEPHRINE BITARTRATE 0.04 MCG/KG/MIN: 4 INJECTION, SOLUTION INTRAVENOUS at 06:06

## 2025-06-03 RX ADMIN — FOLIC ACID 1 MG: 1 TABLET ORAL at 09:06

## 2025-06-03 RX ADMIN — LACTULOSE 30 G: 20 SOLUTION ORAL at 08:06

## 2025-06-03 RX ADMIN — POTASSIUM CHLORIDE 40 MEQ: 1500 TABLET, EXTENDED RELEASE ORAL at 09:06

## 2025-06-03 RX ADMIN — FLUCONAZOLE 200 MG: 100 TABLET ORAL at 11:06

## 2025-06-03 NOTE — CONSULTS
Cardiology Consult Note     Cardiology Attending: Dayanna Johnson MD  Cardiology Fellow: Vernon Cifuentes MD     Date of Admit: 5/23/2025  Date of Consult: 6/3/2025    Reason for Consultation:     Decompensated Heart Failure with Severe Mitral Regurgitation (Chronic)     History of Present Illness:      Ran Reyes Jr. is a 69 y.o. male with NICM-HFrEF (LVEF 20-25%; G3DD), HTN, NOCAD, AF on OAC, PAD, HLD, COPD, and history of VT arrest (3/18/2024 - post op) who presented with resource instability following recent discharged for acute on chronic decompensated heart failure now with progressively worsening decompensated heart failure and suspected cirrhosis.     Cardiology consulted to assist with management - MR is severe and chronic in nature.     The patient reports dyspnea and orthopnea. He reports fatigue. He denies anginal symptom. He is currently on BiPAP - we took him off BiPAP and he desaturated with appropriate plethysmographic waveforms with SpO2s in the mid 80s.      The patient is on low dose vasopressor support with MAPS in the mid 70s.     Net positive yesterday despite diuresis - received Lasix 120 mg (around noon) and Bumex 2 mg IV (0347- thi AM).       Past Medical History:     Past Medical History:   Diagnosis Date    A-fib     Anticoagulant long-term use     Anxiety     Aortic aneurysm     Cataract     CHF (congestive heart failure)     Chronic atrial fibrillation     COPD (chronic obstructive pulmonary disease)     Coronary artery disease     Depression     HLD (hyperlipidemia)     Hypertension     Mitral regurgitation     PAD (peripheral artery disease)     Primary open angle glaucoma (POAG)      Surgical History:     Past Surgical History:   Procedure Laterality Date    ANGIOGRAM, CORONARY, WITH LEFT HEART CATHETERIZATION N/A 03/26/2024    Procedure: Angiogram, Coronary, with Left Heart Cath;  Surgeon: Adriano Montiel MD;  Location: [facility] CATH LAB;  Service: Cardiology;  Laterality: N/A;     ATHERECTOMY OF PERIPHERAL VESSEL Left 09/12/2022    LEFT SFA ATHERECTOMY, BALLOON ANGIOPLASTY    CATARACT EXTRACTION W/  INTRAOCULAR LENS IMPLANT Left 03/09/2023    Procedure: EXTRACTION, CATARACT, WITH IOL INSERTION;  Surgeon: Georgi Borja MD;  Location: [facility];  Service: Ophthalmology;  Laterality: Left;  19.5  mac    EGD, WITH CLOSED BIOPSY  03/22/2024    Procedure: EGD, WITH CLOSED BIOPSY;  Surgeon: Ronald Calderon MD;  Location: [facility] ENDOSCOPY;  Service: Gastroenterology;;    ESOPHAGOGASTRODUODENOSCOPY N/A 03/22/2024    Procedure: EGD;  Surgeon: Ronald Calderon MD;  Location: [facility] ENDOSCOPY;  Service: Gastroenterology;  Laterality: N/A;    Heart Stent N/A     > 10yrs. AGO    INCISION AND DRAINAGE N/A 03/18/2024    Procedure: Incision and Drainage;  Surgeon: Fahad Rivas MD;  Location: [facility];  Service: Urology;  Laterality: N/A;  I&D SCROTAL ABSCESS    INSERTION OF STENT INTO PERIPHERAL VESSEL Right 10/17/2022    RIGHT SFA ATHERECTOMY, BALLOON ANGIOPLASTY, STENT; RIGHT ANTERIOR TIBIAL ARTERY ATHERECTOMY, BALLOON ANGIOPLASTY    ORCHIECTOMY Left 03/18/2024    Procedure: ORCHIECTOMY;  Surgeon: Fahad Rivas MD;  Location: [facility];  Service: Urology;  Laterality: Left;     Allergies:   Review of patient's allergies indicates:  No Known Allergies  Family History:     Family History   Problem Relation Name Age of Onset    Cancer Mother      Hypertension Mother      Heart failure Father      Stroke Father      Hypertension Father      No Known Problems Sister       Social History:   Social History[1]  Review of Systems:     The patient denies headaches, changes in vision, nausea, emesis, fevers, chills, chest pain, palpitations, or abdominal pain.    Medications:     Home Medications:  Prior to Admission medications    Medication Sig Start Date End Date Taking? Authorizing Provider   acetaminophen 325 mg Cap Take 650 mg by mouth 2 (two) times daily as needed.    Provider,  Historical   albuterol (PROVENTIL/VENTOLIN HFA) 90 mcg/actuation inhaler Inhale 2 puffs into the lungs every 6 (six) hours as needed for Shortness of Breath or Wheezing. Rescue 5/19/25   Marino Marquez DO   albuterol-ipratropium (DUO-NEB) 2.5 mg-0.5 mg/3 mL nebulizer solution Take 3 mLs by nebulization every 6 (six) hours as needed for Shortness of Breath (For use when shortness of breath persists following inhaler usage). Rescue 5/19/25   Marino Marquez DO   atorvastatin (LIPITOR) 80 MG tablet Take 1 tablet (80 mg total) by mouth once daily. 5/19/25 8/17/25  Marino Marquez DO   BREZTRI AEROSPHERE 160-9-4.8 mcg/actuation HFAA Inhale 2 puffs into the lungs 2 (two) times daily. 1/7/25   Provider, Historical   bumetanide (BUMEX) 1 MG tablet Take 1 tablet (1 mg total) by mouth daily as needed (Take every other day for heart failure. Take every day if needed for increasing fluid status.). 5/20/25 5/20/26  Marino Marquez DO   clopidogreL (PLAVIX) 75 mg tablet Take 1 tablet (75 mg total) by mouth once daily. 5/19/25   Marino Marquez DO   diphenhydrAMINE (BENADRYL) 25 mg capsule Take 1 capsule (25 mg total) by mouth every 6 (six) hours as needed for Itching. 5/19/25   Marino Marquez DO   folic acid (FOLVITE) 1 MG tablet Take 1 tablet (1 mg total) by mouth once daily. 4/9/24 4/9/25  Tha Edmond MD   gabapentin (NEURONTIN) 300 MG capsule Take 300 mg by mouth 3 (three) times daily.    Provider, Historical   olopatadine (PATANOL) 0.1 % ophthalmic solution Apply to eye. 2/7/25   Provider, Historical   pantoprazole (PROTONIX) 40 MG tablet Take 1 tablet (40 mg total) by mouth once daily. 5/20/25 5/20/26  Marino Marquez DO   potassium chloride SA (K-DUR,KLOR-CON) 20 MEQ tablet Take 1 tablet (20 mEq total) by mouth 2 (two) times daily. 1/28/25 1/28/26  Vernon Cifuentes MD   predniSONE (DELTASONE) 5 MG tablet Take 4 tablets (20 mg total) by mouth 2 (two) times daily for 7 days, THEN 3 tablets (15 mg total) 2 (two) times daily for 7  days, THEN 2 tablets (10 mg total) 2 (two) times daily for 7 days, THEN 1 tablet (5 mg total) 2 (two) times daily for 7 days. 5/19/25 6/16/25  Marino Marquez DO   QUEtiapine (SEROQUEL) 100 MG Tab Take 1 tablet (100 mg total) by mouth every evening. 5/19/25 6/18/25  Marino Marquez DO   rivaroxaban (XARELTO) 20 mg Tab Take 1 tablet (20 mg total) by mouth Daily. 5/19/25   Marino Marquez DO   senna-docusate (PERICOLACE) 8.6-50 mg per tablet Take 1 tablet by mouth 2 (two) times daily as needed for Constipation (Second line). 5/19/25   Marino Marquez DO   sertraline (ZOLOFT) 100 MG tablet Take 100 mg by mouth once daily.    Provider, Historical   sulfamethoxazole-trimethoprim 800-160mg (BACTRIM DS) 800-160 mg Tab Take 1 tablet by mouth 2 (two) times daily. 5/19/25   Marino Marquez DO   urea (CARMOL) 40 % Crea Apply topically. 2/13/25   Provider, Historical   varenicline tartrate (CHANTIX) 1 mg Tab Take 0.5 mg by mouth. 2/13/25   Provider, Historical   vitamin D (VITAMIN D3) 1000 units Tab Take 1,000 Units by mouth once daily.    Provider, Historical       Current/Inpatient Medications:  Infusions:   NORepinephrine bitartrate-D5W  0-3 mcg/kg/min Intravenous Continuous 18.5 mL/hr at 06/03/25 0653 0.06 mcg/kg/min at 06/03/25 0653     Scheduled:   acetaZOLAMIDE  250 mg Intravenous Once    atorvastatin  80 mg Oral Daily    clopidogreL  75 mg Oral Daily    epoetin deep-ebpx (RETACRIT) injection  10,000 Units Subcutaneous Q7 Days    folic acid  1 mg Oral Daily    lactulose  30 g Oral TID    multivitamin  1 tablet Oral Daily    mupirocin   Nasal BID    pantoprazole  40 mg Oral Daily    QUEtiapine  25 mg Oral QHS    rivaroxaban  15 mg Oral Daily with dinner    sertraline  50 mg Oral Daily    thiamine  500 mg Oral Daily    vitamin D  1,000 Units Oral Daily     PRN:    Current Facility-Administered Medications:     albuterol-ipratropium, 3 mL, Nebulization, Q4H PRN    dextrose 50%, 12.5 g, Intravenous, PRN    dextrose 50%, 25 g,  "Intravenous, PRN    glucagon (human recombinant), 1 mg, Intramuscular, PRN    glucose, 16 g, Oral, PRN    glucose, 24 g, Oral, PRN    insulin aspart U-100, 0-5 Units, Subcutaneous, QID (AC + HS) PRN    melatonin, 6 mg, Oral, Nightly PRN    naloxone, 0.02 mg, Intravenous, PRN    sodium chloride 0.9%, 10 mL, Intravenous, Q12H PRN    Objective:     24-hour Vitals:   Temp:  [97 °F (36.1 °C)-98.1 °F (36.7 °C)] 97.4 °F (36.3 °C)  Pulse:  [] 69  Resp:  [14-39] 24  SpO2:  [81 %-100 %] 95 %  BP: ()/(38-89) 106/76    Vitals: /76   Pulse 69   Temp 97.4 °F (36.3 °C) (Temporal)   Resp (!) 24   Ht 5' 10" (1.778 m)   Wt 83.1 kg (183 lb 3.2 oz)   SpO2 95%   BMI 26.29 kg/m²     Intake/Output Summary (Last 24 hours) at 6/3/2025 0720  Last data filed at 6/3/2025 0653  Gross per 24 hour   Intake 2411.25 ml   Output 2301 ml   Net 110.25 ml       Physical Exam  Vitals reviewed.   Constitutional:       General: He is not in acute distress.     Appearance: Normal appearance. He is ill-appearing.   HENT:      Head: Normocephalic and atraumatic.      Right Ear: External ear normal.      Left Ear: External ear normal.      Mouth/Throat:      Mouth: Mucous membranes are moist.   Eyes:      Conjunctiva/sclera: Conjunctivae normal.   Neck:      Comments: JVD is present.   Cardiovascular:      Rate and Rhythm: Normal rate. Rhythm irregular.      Pulses: Normal pulses.      Heart sounds: Murmur heard.   Pulmonary:      Effort: Pulmonary effort is normal. No respiratory distress.      Breath sounds: No stridor. Rhonchi and rales present. No wheezing.      Comments: On BiPAP.   Chest:      Chest wall: No tenderness.   Abdominal:      General: Bowel sounds are normal. There is no distension.      Palpations: Abdomen is soft.   Musculoskeletal:      Cervical back: Neck supple.      Right lower leg: Edema present.      Left lower leg: Edema present.   Skin:     General: Skin is warm and dry.      Capillary Refill: Capillary " refill takes less than 2 seconds.   Neurological:      Mental Status: He is alert and oriented to person, place, and time.        Labs:   I have reviewed the following labs below:    Recent Labs   Lab 05/28/25  0857 05/29/25  0421 05/31/25  0329 05/31/25  1018 06/01/25  2236 06/01/25  2238 06/02/25  0206 06/02/25  1024 06/02/25  1540 06/02/25  1612 06/03/25  0332   WBC  --    < > 5.68   < > 6.34  --  6.66  --   --   --  7.40   HGB  --    < > 8.7*   < > 9.9*  --  9.7*  --   --   --  10.2*   HCT  --    < > 26.6*   < > 30.7*  --  30.3*  --   --   --  32.4*   PLT  --    < > 70*   < > 57*  --  56*  --   --   --  51*   NA  --    < > 137   < > 141  --  139  --   --  141 142   K  --    < > 2.9*   < > 3.0*  --  2.9*  --   --  2.9* 3.7   CL  --    < > 89*   < > 90*  --  87*  --   --  88* 90*   CO2  --    < > 32*   < > 35*  --  37*  --   --  38* 35*   BUN  --    < > 64.1*   < > 73.8*  --  77.6*  --   --  74.9* 70.8*   CREATININE  --    < > 1.95*   < > 2.06*  --  2.12*  --   --  1.90* 1.94*   GLU  --    < > 145*   < > 145*  --  187*  --   --  147* 122*   CALCIUM  --    < > 9.9   < > 9.6  --  9.9  --   --  10.3* 10.0   MG  --    < > 2.20   < > 2.00  --  2.30  --   --   --  2.10   PHOS  --    < > 2.8   < > 2.8  --  3.3  --   --   --  3.3   PROT  --    < > 7.5   < > 7.0  --  6.9  --   --   --  6.9   ALBUMIN  --    < > 3.4   < > 3.0*  --  3.0*  --   --   --  2.9*   BILITOT  --    < > 5.5*   < > 5.0*  --  5.0*  --   --   --  4.9*   AST  --    < > 142*   < > 126*  --  122*  --   --   --  104*   ALT  --    < > 113*   < > 119*  --  116*  --   --   --  107*   ALKPHOS  --    < > 422*   < > 416*  --  399*  --   --   --  430*   INR 3.4*  --  1.9*  --   --  1.2  --   --   --   --   --    TROPONINI  --   --   --    < > 0.115*  --   --  0.153* 0.164*  --   --     < > = values in this interval not displayed.     Cardiovascular Studies/Imaging:   I have reviewed the following studies below:    ECG:  AF with occasional VPCs without ischemia or  injury pattern.     TTE:     Summary  Show Result Comparison     Left Ventricle: The left ventricle is moderately dilated. LVEDD 6.6 cm. There is severely reduced systolic function with a visually estimated ejection fraction of 20 - 25%. There is diastolic dysfunction.    Right Ventricle: Systolic function is mildly reduced.    Mitral Valve: There is moderate (2+) regurgitation.    Tricuspid Valve: There is mild to moderate (1-2+)regurgitation.    Pulmonary Artery: The estimated pulmonary artery systolic pressure is 53 mmHg.    LHC/Cors:  LHC/Cors: 3/26/2024  Coronary findings:     Dominance: right   Left main:  10-20% calcified distal left main disease  Left anterior descending artery:  50% calcified proximal stenosis  Circumflex artery:  Luminal irregularities  Right coronary artery:  Luminal irregularities    Imaging:   CTA Chest Non-Coronary (PE Studies)  Result Date: 6/2/2025  Technique: CT Scan of the chest was performed with intravenous contrast with direct axial images as well as sagittal and coronal reconstruction images pulmonary embolus protocol.  MIP images are also performed Dosage Information: Automated Exposure Control was utilized 1420.75 mGy.cm. Comparison: Comparison is with study dated May 3, 2025 13:18:10. Clinical History: Pe suspected. Findings: Mediastinum: Multiple subcentimeter mediastinal lymph nodes are seen paratracheal subcarinal and bilateral hilar spaces . Heart: Cardiomegaly is seen.  Coronary artery calcification is seen. Aorta: No aortic dissection or aneurysm is seen. Moderate aortic calcification is seen in the thoracic aorta. Tortuousity thoracic aorta. Pulmonary Arteries: No filling defects are seen in the pulmonary arteries to suggest pulmonary embolus. Lungs: There are stable right greater than the left bilateral pleural effusions with adjacent atelectasis. There is interval increase in ground glass opacity in both lungs with more prominent interlobular septal thickening, more  pronounced in the bilateral upper lobes. Bony Structures: Spine: Mild spondylolytic changes are seen in the thoracic spine. Ribs: The visualized ribs appear unremarkable. Abdomen: The visualized upper abdominal organs appear unremarkable.     Impression: 1.  Cardiomegaly. 2. No filling defects are seen in the pulmonary arteries to suggest pulmonary embolus. 3. There are stable right greater than the left bilateral pleural effusions with adjacent atelectasis. There is interval increase in ground glass opacity in both lungs with more prominent interlobular septal thickening, more pronounced in the bilateral upper lobes. This may represent congestive changes with an infectious element not entirely excluded. Correlate with clinical and laboratory findings as regards further evaluation and follow-up. 4. Details and other findings as discussed above. No significant discrepancy with overnight report. Electronically signed by: Mickey West Date:    06/02/2025 Time:    08:18    CT Head Without Contrast  Result Date: 6/2/2025  EXAMINATION: CT HEAD WITHOUT CONTRAST CLINICAL HISTORY: Mental status change, unknown cause; TECHNIQUE: CT imaging of the head performed from the skull base to the vertex without intravenous contrast.  DLP 1421 mGycm. Automatic exposure control, adjustment of mA/kV or iterative reconstruction technique was used to reduce radiation. COMPARISON: 13 May 2025 FINDINGS: There is no acute cortical infarct, hemorrhage or mass lesion.  No new parenchymal attenuation abnormality.  Ventricular size is stable.  There are vascular calcifications. Visualized paranasal sinuses and mastoid air cells are clear.     No acute intracranial findings. No significant discrepancy with the preliminary report. Electronically signed by: Jerald Stephens Date:    06/02/2025 Time:    07:32    X-Ray Chest 1 View  Result Date: 6/1/2025  EXAMINATION: XR CHEST 1 VIEW CLINICAL HISTORY: Sob; TECHNIQUE: Single frontal view of the chest was  performed. COMPARISON: 05/23/2025 FINDINGS: There is blunting of the right costophrenic angle cannot rule out small effusion there increased markings bilaterally most consistent with pulmonary edema.  Heart is enlarged.     Changes most consistent with pulmonary edema Electronically signed by: Kirill Zavala MD Date:    06/01/2025 Time:    22:23    US Retroperitoneal Complete  Result Date: 5/28/2025  EXAMINATION: US RETROPERITONEAL COMPLETE CLINICAL HISTORY: ELIZABETH;   weakness TECHNIQUE: Ultrasound of the kidneys and urinary bladder was performed including color flow and grayscale evaluation of the kidneys. COMPARISON: CT chest abdomen pelvis 05/03/2025 FINDINGS: RIGHT KIDNEY: The right kidney measures 10.5 cm.  No hydronephrosis. LEFT KIDNEY: The left kidney measures 9.2 cm. No hydronephrosis.  Lobulated contour. BLADDER: Bladder volume calculated 325 mL. OTHER: Aorta and IVC not imaged.     Normal sized, nonobstructed kidneys. Electronically signed by: Sailaja Atkinson Date:    05/28/2025 Time:    14:55    US Abdomen Limited  Result Date: 5/28/2025  EXAMINATION: US ABDOMEN LIMITED CLINICAL HISTORY: Liver cirrhosis; TECHNIQUE: Limited ultrasound of the right upper quadrant of the abdomen was performed. COMPARISON: CT chest abdomen pelvis 05/03/2025 FINDINGS: LIVER: 20 cm cranial caudal at the midclavicular line. Nodular surface contour.  No focal mass appreciable. Portal vein is patent with appropriate directional flow.  Biphasic flow at the portal vein as can be seen with heart failure or portal hypertension. PANCREAS: Obscured by overlying bowel gas. GALLBLADDER: Gallbladder is contracted.  No appreciable shadowing gallstone. BILE DUCTS: 3 mm common bile duct.  Distal common bile duct is obscured by shadowing bowel gas. INFERIOR VENA CAVA: Normal in appearance. RIGHT KIDNEY: Images of the right kidney were performed on concurrent retroperitoneal sonogram.  See separate report. OTHER: Small volume perihepatic free  fluid.     Hepatomegaly.  Nodular hepatic surface contour Small volume perihepatic free fluid Biphasic flow at the portal vein as can be seen with heart failure or portal hypertension Electronically signed by: Sailaja Atkinson Date:    05/28/2025 Time:    14:54    Assessment:     Ran Reyes Jr. is a 69 y.o. male with NICM-HFrEF (LVEF 20-25%; G3DD), HTN, NOCAD, AF on OAC, PAD, HLD, COPD, and history of VT arrest (3/18/2024 - post op) who presented with resource instability following recent discharged for acute on chronic decompensated heart failure now with progressively worsening decompensated heart failure and suspected cirrhosis.     Plan/Recommendations:   Acute on Chronic Decompensated Heart Failure with Severe Mitral Regurgitation  -Stage D Functional Class IV without ICD - prescribed WCD.   -Unable to tolerated GDMT at this time given vasopressor requirements - wean vasopressors - target MAP at least 60 unless other signs of end organ dysfunction - has severely reduced LVEF.   -Needs continued diuresis - remains volume overloaded.  -Previously not on SGLT2i due to history of Claudine's.   -No acute indication for mitral regurgitation intervention - treat decompensated heart failure - falsely depressed severity due to degree of hypotension during study (68/38 mmHg).   -Consider palliative care consultation - without family support and with inability to care for himself unsure of benefit of advanced heart failure services.     Non Obstructive Epicardial Coronary Artery Disease  -Plavix 75 mg daily. Lipitor 80 mg daily.     Paroxysmal Atrial Fibrillation   -Hold rate control agents - on vasopressor therapy currently.   -Xarelto dosed for ClCr.  Warrants AC from cardiac perspective for CVA risk reduction in the setting of elevated CV2 score.     Hypertension  -GDMT as tolerated for HFrEF. Holding - on vasopressor currently.     Peripheral Artery Disease  -Plavix 75 mg daily. Lipitor 80 mg daily.      Hypercholesterolemia  -Lipitor 80 mg daily.     The patient warrants continued diuresis - he is clinically volume overloaded. The mechanism of his renal injury is likely renovascular congestion.     If there is a discrepancy in his volume status a right heart catheterization can objectively guide therapy - though I wouldn't perform this as clinically he is volume overloaded.     Start Dobutamine 2.5 mcg/kg/min fixed dose.     Vernon Cifuentes MD  [facility] Chief Cardiology Fellow, PGY-6  [facility]                [1]   Social History  Tobacco Use    Smoking status: Every Day     Current packs/day: 0.50     Average packs/day: 0.8 packs/day for 50.4 years (41.0 ttl pk-yrs)     Types: Cigarettes     Start date: 1975     Passive exposure: Current    Smokeless tobacco: Never    Tobacco comments:     States smoke 2-3 cigarettes per day   Substance Use Topics    Alcohol use: Yes     Alcohol/week: 3.0 standard drinks of alcohol     Types: 3 Cans of beer per week     Comment: socially    Drug use: Not Currently     Frequency: 1.0 times per week     Types: Marijuana     Comment: no use in over 1 year

## 2025-06-03 NOTE — PLAN OF CARE
Problem: Adult Inpatient Plan of Care  Goal: Plan of Care Review  Outcome: Not Progressing  Flowsheets (Taken 6/3/2025 1212)  Plan of Care Reviewed With: patient  Goal: Patient-Specific Goal (Individualized)  Outcome: Not Progressing  Flowsheets (Taken 6/3/2025 1212)  Individualized Care Needs: PT  Anxieties, Fears or Concerns: getting out of hospital  Patient/Family-Specific Goals (Include Timeframe): Being able to get out of bed with PT  Goal: Absence of Hospital-Acquired Illness or Injury  Outcome: Not Progressing  Intervention: Identify and Manage Fall Risk  Flowsheets (Taken 6/3/2025 1212)  Safety Promotion/Fall Prevention:   assistive device/personal item within reach   high risk medications identified   lighting adjusted   medications reviewed   room near unit station  Intervention: Prevent Skin Injury  Flowsheets (Taken 6/3/2025 1212)  Body Position: 30 degrees  Skin Protection: pulse oximeter probe site changed  Device Skin Pressure Protection:   absorbent pad utilized/changed   adhesive use limited  Intervention: Prevent and Manage VTE (Venous Thromboembolism) Risk  Flowsheets (Taken 6/3/2025 1212)  VTE Prevention/Management:   remove, assess skin, and reapply sequential compression device   bleeding precautions maintained   bleeding risk assessed   bleeding risk factor(s) identified, provider notified  Intervention: Prevent Infection  Flowsheets (Taken 6/3/2025 1212)  Infection Prevention:   cohorting utilized   environmental surveillance performed   hand hygiene promoted   single patient room provided  Goal: Optimal Comfort and Wellbeing  Outcome: Not Progressing  Goal: Readiness for Transition of Care  Outcome: Not Progressing     Problem: Heart Failure  Goal: Optimal Coping  Outcome: Not Progressing  Goal: Optimal Cardiac Output  Outcome: Not Progressing  Goal: Stable Heart Rate and Rhythm  Outcome: Not Progressing  Goal: Optimal Functional Ability  Outcome: Not Progressing  Goal: Fluid and  Electrolyte Balance  Outcome: Not Progressing  Goal: Improved Oral Intake  Outcome: Not Progressing  Goal: Effective Oxygenation and Ventilation  Outcome: Not Progressing  Goal: Effective Breathing Pattern During Sleep  Outcome: Not Progressing     Problem: Acute Kidney Injury/Impairment  Goal: Fluid and Electrolyte Balance  Outcome: Not Progressing  Goal: Improved Oral Intake  Outcome: Not Progressing  Goal: Effective Renal Function  Outcome: Not Progressing     Problem: Infection  Goal: Absence of Infection Signs and Symptoms  Outcome: Not Progressing     Problem: Skin Injury Risk Increased  Goal: Skin Health and Integrity  Outcome: Not Progressing     Problem: Comorbidity Management  Goal: Maintenance of COPD Symptom Control  Outcome: Not Progressing  Goal: Maintenance of Heart Failure Symptom Control  Outcome: Not Progressing  Goal: Blood Pressure in Desired Range  Outcome: Not Progressing     Problem: Dysrhythmia  Goal: Normalized Cardiac Rhythm  Outcome: Not Progressing

## 2025-06-03 NOTE — PROGRESS NOTES
Gastroenterology/Hepatology Progress Note    Patient Name: Ran Reyes Jr.  Age: 69 y.o.  : 1955  MRN: [id number]  Admission Date: 2025      Self, Aaareferral    SUBJECTIVE:   Patient remains in the ICU.  Currently on BiPAP.  States that he does feel better this morning.  He is alert and oriented x4.  Noted to have fluctuating mentation which improved after starting lactulose.  He denies abdominal pain, N/V/D, constipation.  Continues to have decreased appetite.  CMP does show an elevated ALP, transaminitis is downtrending.  Echocardiogram done yesterday showing severely reduced systolic function with the ejection fraction of 20-25%.  PASP is 53 and there is moderate mitral valve regurgitation.  Continuing lactulose 30 g t.i.d.. Patient is having 2-3 bowel movements daily. Still requiring pressor support.        Review of patient's allergies indicates:  No Known Allergies       INPATIENT MEDICATIONS    Scheduled Meds:   [START ON 2025] apixaban  5 mg Oral BID    atorvastatin  80 mg Oral Daily    clopidogreL  75 mg Oral Daily    epoetin deep-ebpx (RETACRIT) injection  10,000 Units Subcutaneous Q7 Days    [START ON 2025] fluconazole  100 mg Oral Daily    folic acid  1 mg Oral Daily    lactulose  30 g Oral TID    multivitamin  1 tablet Oral Daily    mupirocin   Nasal BID    pantoprazole  40 mg Oral Daily    QUEtiapine  25 mg Oral QHS    sertraline  50 mg Oral Daily    thiamine  500 mg Oral Daily    vitamin D  1,000 Units Oral Daily     Continuous Infusions:   NORepinephrine bitartrate-D5W  0-3 mcg/kg/min Intravenous Continuous 18.5 mL/hr at 25 0.06 mcg/kg/min at 25     PRN Meds:    Current Facility-Administered Medications:     albuterol-ipratropium, 3 mL, Nebulization, Q4H PRN    dextrose 50%, 12.5 g, Intravenous, PRN    dextrose 50%, 25 g, Intravenous, PRN    glucagon (human recombinant), 1 mg, Intramuscular, PRN    glucose, 16 g, Oral, PRN    glucose, 24 g, Oral, PRN     insulin aspart U-100, 0-5 Units, Subcutaneous, QID (AC + HS) PRN    melatonin, 6 mg, Oral, Nightly PRN    naloxone, 0.02 mg, Intravenous, PRN    sodium chloride 0.9%, 10 mL, Intravenous, Q12H PRN          Review of Systems:       Review of Systems   Constitutional:  Positive for activity change, appetite change and fatigue. Negative for unexpected weight change.   HENT:  Negative for trouble swallowing.    Respiratory:  Positive for shortness of breath.    Cardiovascular:  Positive for leg swelling.   Gastrointestinal:  Negative for abdominal pain, blood in stool, change in bowel habit, constipation and diarrhea.   Musculoskeletal: Negative.    Neurological: Negative.    Hematological: Negative.    Psychiatric/Behavioral: Negative.     All other systems reviewed and are negative.         Objective:       VITAL SIGNS: 24 HR MIN & MAX LAST    Temp  Min: 96.7 °F (35.9 °C)  Max: 97.8 °F (36.6 °C)  97.8 °F (36.6 °C)        BP  Min: 65/47  Max: 139/89  (!) 65/47     Pulse  Min: 51  Max: 101  76     Resp  Min: 18  Max: 41  (!) 28    SpO2  Min: 86 %  Max: 100 %  99 %        Physical Exam  Vitals reviewed.   Constitutional:       Appearance: He is ill-appearing.   HENT:      Head: Normocephalic and atraumatic.      Mouth/Throat:      Mouth: Mucous membranes are moist.   Eyes:      Extraocular Movements: Extraocular movements intact.      Conjunctiva/sclera: Conjunctivae normal.   Cardiovascular:      Rate and Rhythm: Normal rate and regular rhythm.   Pulmonary:      Effort: Accessory muscle usage present.      Breath sounds: Rhonchi present.   Abdominal:      General: Bowel sounds are normal.      Palpations: Abdomen is soft.      Tenderness: There is no abdominal tenderness.   Musculoskeletal:      Cervical back: Normal range of motion.      Right lower le+ Pitting Edema present.      Left lower le+ Pitting Edema present.   Skin:     General: Skin is warm and dry.   Neurological:      General: No focal deficit  present.      Mental Status: He is oriented to person, place, and time. He is lethargic.   Psychiatric:         Mood and Affect: Mood normal.         Behavior: Behavior normal.          LABS:    Recent Labs   Lab 05/31/25 0329 06/01/25 0531 06/01/25 2236 06/02/25  0206 06/03/25  0332   WBC 5.68 6.01 6.34 6.66 7.40   HGB 8.7* 9.5* 9.9* 9.7* 10.2*   HCT 26.6* 29.3* 30.7* 30.3* 32.4*   PLT 70* 89* 57* 56* 51*   MCV 78.0* 77.9* 79.1* 78.9* 81.2       Recent Labs   Lab 05/28/25 0435 05/29/25 0422 05/30/25 0421 05/31/25 0329 06/01/25 0531 06/01/25 2236 06/02/25  0206 06/03/25  0332   HGB 8.2* 7.8* 8.0* 8.7* 9.5* 9.9* 9.7* 10.2*   HCT 24.8* 23.7* 24.3* 26.6* 29.3* 30.7* 30.3* 32.4*        Recent Labs   Lab 05/28/25 0435 05/29/25 0421 06/01/25 0531 06/01/25 2236 06/02/25 0206 06/02/25  1612 06/03/25  0332   *   < > 137 141 139 141 142   K 5.1   < > 3.6 3.0* 2.9* 2.9* 3.7   CO2 21*   < > 31 35* 37* 38* 35*   BUN 60.0*   < > 73.6* 73.8* 77.6* 74.9* 70.8*   CREATININE 1.86*   < > 2.02* 2.06* 2.12* 1.90* 1.94*   BILITOT 5.8*  5.8*   < > 5.1* 5.0* 5.0*  --  4.9*   BILIDIR 3.7*  --   --   --   --   --   --    IBILI 2.10*  --   --   --   --   --   --    ALKPHOS 383*   < > 423* 416* 399*  --  430*   AST 66*   < > 145* 126* 122*  --  104*   ALT 61*   < > 126* 119* 116*  --  107*   ALBUMIN 3.4   < > 3.2* 3.0* 3.0*  --  2.9*    < > = values in this interval not displayed.        Recent Labs   Lab 05/28/25  0857 05/31/25  0329 06/01/25  2238   INR 3.4* 1.9* 1.2   PROTIME 35.2* 22.4* 14.9*        Recent Labs   Lab 05/28/25  1042   IRON 34*   FERRITIN 190.16            IMAGING:   X-Ray Chest 1 View  Result Date: 6/3/2025  EXAMINATION: XR CHEST 1 VIEW CLINICAL HISTORY: hypoxia; TECHNIQUE: Single frontal view of the chest was performed. COMPARISON: 06/01/2025 FINDINGS: LINES AND TUBES: EKG/telemetry leads overlie the chest. MEDIASTINUM AND MARTA: Cardiac silhouette is enlarged. LUNGS: Diffuse interstitial opacities with  mild progression at the left upper lung zone compared to prior. PLEURA:No large pleural effusion by portable radiographic evaluation.No pneumothorax. OTHER: No acute osseous abnormality.     Mildly progressed interstitial opacities. Electronically signed by: Sailaja Atkinson Date:    06/03/2025 Time:    10:42    Echo Saline Bubble? No  Result Date: 6/2/2025    Left Ventricle: The left ventricle is moderately dilated. LVEDD 6.6 cm. There is severely reduced systolic function with a visually estimated ejection fraction of 20 - 25%. There is diastolic dysfunction.   Right Ventricle: Systolic function is mildly reduced.   Mitral Valve: There is moderate (2+) regurgitation.   Tricuspid Valve: There is mild to moderate (1-2+)regurgitation.   Pulmonary Artery: The estimated pulmonary artery systolic pressure is 53 mmHg.     CTA Chest Non-Coronary (PE Studies)  Result Date: 6/2/2025  Technique: CT Scan of the chest was performed with intravenous contrast with direct axial images as well as sagittal and coronal reconstruction images pulmonary embolus protocol.  MIP images are also performed Dosage Information: Automated Exposure Control was utilized 1420.75 mGy.cm. Comparison: Comparison is with study dated May 3, 2025 13:18:10. Clinical History: Pe suspected. Findings: Mediastinum: Multiple subcentimeter mediastinal lymph nodes are seen paratracheal subcarinal and bilateral hilar spaces . Heart: Cardiomegaly is seen.  Coronary artery calcification is seen. Aorta: No aortic dissection or aneurysm is seen. Moderate aortic calcification is seen in the thoracic aorta. Tortuousity thoracic aorta. Pulmonary Arteries: No filling defects are seen in the pulmonary arteries to suggest pulmonary embolus. Lungs: There are stable right greater than the left bilateral pleural effusions with adjacent atelectasis. There is interval increase in ground glass opacity in both lungs with more prominent interlobular septal thickening, more  pronounced in the bilateral upper lobes. Bony Structures: Spine: Mild spondylolytic changes are seen in the thoracic spine. Ribs: The visualized ribs appear unremarkable. Abdomen: The visualized upper abdominal organs appear unremarkable.     Impression: 1.  Cardiomegaly. 2. No filling defects are seen in the pulmonary arteries to suggest pulmonary embolus. 3. There are stable right greater than the left bilateral pleural effusions with adjacent atelectasis. There is interval increase in ground glass opacity in both lungs with more prominent interlobular septal thickening, more pronounced in the bilateral upper lobes. This may represent congestive changes with an infectious element not entirely excluded. Correlate with clinical and laboratory findings as regards further evaluation and follow-up. 4. Details and other findings as discussed above. No significant discrepancy with overnight report. Electronically signed by: Mickey West Date:    06/02/2025 Time:    08:18    CT Head Without Contrast  Result Date: 6/2/2025  EXAMINATION: CT HEAD WITHOUT CONTRAST CLINICAL HISTORY: Mental status change, unknown cause; TECHNIQUE: CT imaging of the head performed from the skull base to the vertex without intravenous contrast.  DLP 1421 mGycm. Automatic exposure control, adjustment of mA/kV or iterative reconstruction technique was used to reduce radiation. COMPARISON: 13 May 2025 FINDINGS: There is no acute cortical infarct, hemorrhage or mass lesion.  No new parenchymal attenuation abnormality.  Ventricular size is stable.  There are vascular calcifications. Visualized paranasal sinuses and mastoid air cells are clear.     No acute intracranial findings. No significant discrepancy with the preliminary report. Electronically signed by: Jerald Stephens Date:    06/02/2025 Time:    07:32    X-Ray Chest 1 View  Result Date: 6/1/2025  EXAMINATION: XR CHEST 1 VIEW CLINICAL HISTORY: Sob; TECHNIQUE: Single frontal view of the chest was  performed. COMPARISON: 05/23/2025 FINDINGS: There is blunting of the right costophrenic angle cannot rule out small effusion there increased markings bilaterally most consistent with pulmonary edema.  Heart is enlarged.     Changes most consistent with pulmonary edema Electronically signed by: Kirill Zavala MD Date:    06/01/2025 Time:    22:23    US Retroperitoneal Complete  Result Date: 5/28/2025  EXAMINATION: US RETROPERITONEAL COMPLETE CLINICAL HISTORY: ELIZABETH;   weakness TECHNIQUE: Ultrasound of the kidneys and urinary bladder was performed including color flow and grayscale evaluation of the kidneys. COMPARISON: CT chest abdomen pelvis 05/03/2025 FINDINGS: RIGHT KIDNEY: The right kidney measures 10.5 cm.  No hydronephrosis. LEFT KIDNEY: The left kidney measures 9.2 cm. No hydronephrosis.  Lobulated contour. BLADDER: Bladder volume calculated 325 mL. OTHER: Aorta and IVC not imaged.     Normal sized, nonobstructed kidneys. Electronically signed by: Sailaja Atkinson Date:    05/28/2025 Time:    14:55    US Abdomen Limited  Result Date: 5/28/2025  EXAMINATION: US ABDOMEN LIMITED CLINICAL HISTORY: Liver cirrhosis; TECHNIQUE: Limited ultrasound of the right upper quadrant of the abdomen was performed. COMPARISON: CT chest abdomen pelvis 05/03/2025 FINDINGS: LIVER: 20 cm cranial caudal at the midclavicular line. Nodular surface contour.  No focal mass appreciable. Portal vein is patent with appropriate directional flow.  Biphasic flow at the portal vein as can be seen with heart failure or portal hypertension. PANCREAS: Obscured by overlying bowel gas. GALLBLADDER: Gallbladder is contracted.  No appreciable shadowing gallstone. BILE DUCTS: 3 mm common bile duct.  Distal common bile duct is obscured by shadowing bowel gas. INFERIOR VENA CAVA: Normal in appearance. RIGHT KIDNEY: Images of the right kidney were performed on concurrent retroperitoneal sonogram.  See separate report. OTHER: Small volume perihepatic free  fluid.     Hepatomegaly.  Nodular hepatic surface contour Small volume perihepatic free fluid Biphasic flow at the portal vein as can be seen with heart failure or portal hypertension Electronically signed by: Sailaja Atkinson Date:    05/28/2025 Time:    14:54    X-Ray Chest 1 View  Result Date: 5/23/2025  EXAMINATION XR CHEST 1 VIEW CLINICAL HISTORY weakness; TECHNIQUE A total of 1 frontal image(s) submitted of the chest. COMPARISON 4 May 2025 FINDINGS Lines/tubes/devices: ECG leads overlie the imaged region.  Right PICC no longer visualized. There is similar enlargement the cardiac silhouette, central vascular congestion, and widespread lung interstitial changes.  The trachea is midline. No new or worsening consolidation is developed in the interval.  There is no large pleural effusion or convincing pneumothorax. Regional osseous structures and extrathoracic soft tissues are similar. IMPRESSION No acute process or other adverse interval change.  Chronic secondary findings discussed above. Electronically signed by: Ramana Mcleod Date:    05/23/2025 Time:    19:54    CT Head Without Contrast  Result Date: 5/13/2025  EXAMINATION: CT HEAD WITHOUT CONTRAST INDICATION: Mental status change, unknown cause; TECHNIQUE: Contiguous axial images are acquired through the head without IV contrast.  These images were reconstructed into the coronal and sagittal plane.  Automated exposure control, dose radiation lowering technique was utilized. COMPARISON: Head CT from 12/05/2024 FINDINGS: Hemorrhage: No acute intracranial hemorrhage is seen. Brain parenchyma: Subtle stable appearing microvascular change is seen in portions of the periventricular and deep white matter tracts. Cerebellum: Unremarkable. Vascular: Unremarkable venous sinuses. Sella and skull base: The sella appears to be within normal limits for age. Intracranial calcifications: Incidental note is made of bilateral choroid plexus calcification. Incidental note is  made of some pineal region calcification. Incidental note is made of some calcification of the falx. Calvarium: No acute linear or depressed skull fracture is seen. Maxillofacial Structures: Paranasal sinuses: The visualized paranasal sinuses appear clear with no mucoperiosteal thickening or air fluid levels identified. Orbits: The orbits appear unremarkable. Zygomatic arches: The zygomatic arches are intact and unremarkable. Temporal bones and mastoids: The temporal bones and mastoids appear unremarkable. TMJ: The mandibular condyles appear normally placed with respect to the mandibular fossa. Nasal Bones: No displaced nasal bone fracture is seen. Visualized upper cervical spine: The visualized cervical spine appears unremarkable.     1. No evidence of acute intracranial abnormality. [employer] concurrence Electronically signed by: Harris Chaudhry MD Date:    05/13/2025 Time:    07:45    Echo  Result Date: 5/9/2025    Mitral Valve: There is severe regurgitation with a centrally directed jet.     X-Ray Chest 1 View  Result Date: 5/5/2025  EXAMINATION: XR CHEST 1 VIEW CLINICAL HISTORY: SOB; TECHNIQUE: Single frontal view of the chest was performed. COMPARISON: 04/30/2025 FINDINGS: PICC line remains in place.  There is cephalization of blood flow.  Heart is enlarged.  There is mild blunting of both costophrenic angles cannot rule out small effusions.  There is vascular calcification noted.     Changes most consistent with pulmonary edema Electronically signed by: Kirill Zavala MD Date:    05/05/2025 Time:    06:58        Assessment / Plan:     Ran Reyes Jr. is a 69 y.o. male with nonischmic CM (EF 22%), diastolic HF with moderate Pulm HTN (PASP 59mmHg), pAF on Xarelto, PAD s/p stent on plavix, COPD, tobacco use admitted for need for nursing home placement.    GI consulted for concern for liver disease.      Likely cirrhosis - cardiac vs alcohol vs multifactorial  No biliary concern at this time - gallbladder  contracted, CBD 3 mm  Likely does have a component of congestive hepatopathy Hyponatremia resolved - combination of beer use, liver and cardiac disease     - MELD-Na: 24 (improved - driven by renal function, TB)  - CTP: C   - INR was 3.4 - recommended holding due to supratherapeutic level and uptrending.  INR now 1.2, has not been restarted, but shows that liver synthetic function is good and INR elevation due to DOAC  - OK to resume anticoagulation from GI standpoint.  - Needs complete alcohol cessation  - PETH negative  - Treat underlying cardiac issues - diuresis/volume management per nephrology and cardiology     Ascites: none  Encephalopathy: waxing and waning mentation over the weekend.  Now on lactulose - titrate to 2-3 Bms a day.  Remains alert and oriented x 4 today  Varices: None on EGD in March 2024  HCC screening: US without lesion.  AFP 4.8     2. Acute on chronic KI  - Has been getting diuresis since admission - management by nephrology  - Creatinine continuing to uptrend and now with volume contraction along with hypoxia       3. Anemia, MCV low  - Ferritin borderline in the past (30s)  - No overt bleeding  - On plavix and xarelto - INR 1.2  - No prior colonoscopy  - Prior EGD with inflammation - on ppi  - Ferritin 190 now but likely mixed picture of NANDO and AoCD.    - Recommend iron supplementation      Boby Guadarrama MD  [facility] IM PGY-3, GI

## 2025-06-03 NOTE — PT/OT/SLP PROGRESS
Physical Therapy Treatment    Patient Name:  Ran Reyes Jr.   MRN:  [id number]    Recommendations     Therapy Intensity Recommendations at Discharge: Low Intensity Therapy (Hospice?)  Discharge Equipment Recommendations: to be determined by next level of care   Barriers to discharge: severity of deficits, level of skilled assistance required, decreased endurance, limited family support, and complicated medical history    Assessment     Ran Reyes Jr. is a 69 y.o. male admitted with a medical diagnosis of  :  1. Chronic congestive heart failure, unspecified heart failure type    2. Liver cirrhosis    3. Congestive heart failure, unspecified HF chronicity, unspecified heart failure type    4. Weakness    5. Longstanding persistent atrial fibrillation    6. ELIZABETH (acute kidney injury)    7. Acute cystitis without hematuria    8. Unable to care for self    9. Chest pain    10. Elevated troponin    11. Arteriosclerosis of coronary artery    12. Hepatic cirrhosis, unspecified hepatic cirrhosis type, unspecified whether ascites present    13. Anemia in chronic kidney disease, unspecified CKD stage    14. Hypokalemia    15. Hyponatremia    16. Tobacco dependency    17. SOB (shortness of breath)    18. Routine check-up       Problem List[1]   He presents with the following impairments/functional limitations:  weakness, impaired endurance, impaired functional mobility, decreased upper extremity function, decreased lower extremity function, edema, impaired cardiopulmonary response to activity.    Rehab Prognosis: Poor.    Patient would benefit from continued skilled acute PT services to: address above listed impairments/functional limitations; receive patient/caregiver education; reduce fall risk; and maximize independency/safety with functional mobility.    Recent Surgery: * No surgery found *      Plan     During this hospitalization, patient to be seen 3 x/week to address the identified impairments/functional  limitations via therapeutic activities, therapeutic exercises, neuromuscular re-education, wheelchair management/training and progress toward the established goals.    Plan of Care Expires:  06/23/25    Subjective     Communicated with patient's nurse Aurora prior to session.    Patient agreeable to participate in treatment session.    Chief Complaint: Bottom hurting and chest pain  Patient/Family Comments/goals: Feel better  Pain/Comfort:  Pain Rating 1: 5/10  Location 1: chest  Pain Addressed 1: Cessation of Activity, Nurse notified    Objective     Patient found with HOB elevated with PureWick, pulse ox (continuous), blood pressure cuff, peripheral IV, oxygen (rectal tube)  upon PT entry to room.    General Precautions: Standard, contact, fall   Orthopedic Precautions:N/A   Braces:  N/A  Respiratory Status: 15 liters/min O2 via non-re breather    Functional Mobility:    Bed Mobility:  MAX A for all bed mobility     Other Mobility:  Therapeutic Exercises performed:        -bed level exercises:              SLR's            hip abduction            hip adduction            heel slides            ankle pumps            Modified sit ups using B hand rails and pulling trunk away from bed surface in chair position       -5 x 2 reps with AAROM needed - pt's O2 would drop and he would become SOB after 2 reps. PT monitored HR and O2 during session, only proceeding to next reps once WFL.     HR up to 175 with T tach demonstrated on monitor, O2 saturation ranged from 67-90%    Patient left in chair position in bed with all lines intact, call button in reach, tray table at bedside, patient's nurse notified, and nurse present.    DME Justifications:  TBD    Education     Patient educated on and assisted with functional mobility as noted above.  Patient educated on PT Plan of Care.  Patient was instructed to utilize staff assistance for mobility/transfers.  White board updated regarding patient's safest level of mobility with  staff assistance    Goals     Multidisciplinary Problems       Physical Therapy Goals          Problem: Physical Therapy    Goal Priority Disciplines Outcome Interventions   Physical Therapy Goal     PT, PT/OT Ongoing    Description: Goals to be met by: Discharge      Patient will increase functional independence with mobility by performing:    New goals established due to functional decline and upgrade to ICU    1. Improve B LE MMT to 3+/5  2. Rolling side to side to be completed at MIN A  3. Supine<>Sit to be completed at MIN A  4. Sit to stands to be assessed and completed once able.                        Time Tracking     PT Received On: 06/03/25  PT Start Time: 1115     PT Stop Time: 1138  PT Total Time (min): 23 min     Billable Minutes: Therapeutic Exercise 23 06/03/2025       [1]   Patient Active Problem List  Diagnosis    Primary open angle glaucoma (POAG) of right eye, moderate stage    Combined forms of age-related cataract of left eye    Arteriosclerosis of coronary artery    Chronic atrial fibrillation    Dyslipidemia    Hypertension    Tobacco use    PVD (peripheral vascular disease)    VHD (valvular heart disease)    COVID-19    HFrEF (heart failure with reduced ejection fraction)    Nonrheumatic mitral valve regurgitation    Postoperative eye state    Scrotal hematoma    Chronic obstructive pulmonary disease, unspecified    Lesion of external ear    Low back pain    Other thrombophilia    Secondary hyperaldosteronism    Positive colorectal cancer screening using Cologuard test    Acute heart failure    History of COPD    CHF (congestive heart failure)    Acute cystitis with hematuria    Tobacco dependency    Liver cirrhosis    Hypokalemia    Hyponatremia    Unable to care for self

## 2025-06-03 NOTE — PROGRESS NOTES
"  Nephrology Progress Note    Patient Name: Ran Reyes Jr.  Age: 69 y.o.  : 1955  MRN: [id number]  Admission Date: 2025    Chief complaint: Fatigue and Leg Swelling (PT IN /[facility] W REPORT OF WEAKNESS/SOB AND EDEMA TO LOWER LEGS. STATES HAS NOT EATEN X 18 HRS.  CBG EN ROUTE 137. 20G IV TO LT HAND /EMS.   PT FOUND TO HAVE BED BUGS /[facility]. EKG TO BE OBTAINED. )    Hospital course  Ran Reyes Jr. is a 69 y.o. Black or  male with past medical history of COPD, hypertension, pulmonary hypertension, dyslipidemia, atrial fibrillation, nonobstructive coronary artery disease, nonischemic cardiomyopathy with severely reduced ejection fraction (22%), peripheral vascular disease, remote history of Claudine's gangrene, glaucoma, and tobacco abuse.  Patient presented to the emergency department on 2025 with complaints of generalized weakness, fatigue, and lower extremity edema.  He was admitted to medicine service for treatment of CHF exacerbation, developed acute kidney injury and multiple electrolyte abnormalities for which Nephrology Service was consulted.    Interval Hx  Patient is resting in bed on bipap. Difficult to arouse today. Denies acute complaints. First dose of diamox today. 2.3L uop overnight.      Review of Systems  Cardiovascular: Negative for chest pain   Respiratory: Negative for shortness of breath    Objective  /76   Pulse 69   Temp 97.4 °F (36.3 °C) (Temporal)   Resp (!) 24   Ht 5' 10" (1.778 m)   Wt 83.1 kg (183 lb 3.2 oz)   SpO2 95%   BMI 26.29 kg/m²     Intake/Output Summary (Last 24 hours) at 6/3/2025 1037  Last data filed at 6/3/2025 0653  Gross per 24 hour   Intake 2176.25 ml   Output 2301 ml   Net -124.75 ml       Physical Exam  General appearance: ill appearing. Patient is in no acute distress.  Skin: No rashes or wounds.  HEENT: EOMI, no scleral icterus, no JVD. Neck is supple.  Pulm: Respirations are unlabored. Coarse breath sounds.   Heart: S1, " S2.   Abdomen: Benign.  : Deferred.  Extremities:  Trace bilateral lower extremity edema, unchanged   Neuro: difficult to arouse    Medications  Scheduled Meds:   [START ON 6/4/2025] apixaban  5 mg Oral BID    atorvastatin  80 mg Oral Daily    bumetanide  4 mg Intravenous Once    clopidogreL  75 mg Oral Daily    epoetin deep-ebpx (RETACRIT) injection  10,000 Units Subcutaneous Q7 Days    [START ON 6/4/2025] fluconazole  100 mg Oral Daily    fluconazole  200 mg Oral Daily    folic acid  1 mg Oral Daily    lactulose  30 g Oral TID    multivitamin  1 tablet Oral Daily    mupirocin   Nasal BID    pantoprazole  40 mg Oral Daily    QUEtiapine  25 mg Oral QHS    sertraline  50 mg Oral Daily    thiamine  500 mg Oral Daily    vitamin D  1,000 Units Oral Daily     Continuous Infusions:   NORepinephrine bitartrate-D5W  0-3 mcg/kg/min Intravenous Continuous 18.5 mL/hr at 06/03/25 0653 0.06 mcg/kg/min at 06/03/25 0653     PRN Meds:.  Current Facility-Administered Medications:     albuterol-ipratropium, 3 mL, Nebulization, Q4H PRN    dextrose 50%, 12.5 g, Intravenous, PRN    dextrose 50%, 25 g, Intravenous, PRN    glucagon (human recombinant), 1 mg, Intramuscular, PRN    glucose, 16 g, Oral, PRN    glucose, 24 g, Oral, PRN    insulin aspart U-100, 0-5 Units, Subcutaneous, QID (AC + HS) PRN    melatonin, 6 mg, Oral, Nightly PRN    naloxone, 0.02 mg, Intravenous, PRN    sodium chloride 0.9%, 10 mL, Intravenous, Q12H PRN     Imaging:    Reviewed    Laboratory Data:    Chemistry  Recent Labs     06/01/25  0531 06/01/25  2236 06/02/25  0206 06/02/25  1612 06/03/25  0332    141 139 141 142   K 3.6 3.0* 2.9* 2.9* 3.7   CO2 31 35* 37* 38* 35*   BUN 73.6* 73.8* 77.6* 74.9* 70.8*   CREATININE 2.02* 2.06* 2.12* 1.90* 1.94*   EGFRNORACEVR 35 35 33 38 37   CALCIUM 10.0 9.6 9.9 10.3* 10.0   MG 2.30 2.00 2.30  --  2.10   PHOS 2.3 2.8 3.3  --  3.3      LFT  Lab Results   Component Value Date    ALKPHOS 430 (H) 06/03/2025     (H)  06/03/2025     (H) 06/03/2025    BILIDIR 3.7 (H) 05/28/2025    IBILI 2.10 (H) 05/28/2025    BILITOT 4.9 (H) 06/03/2025    ALBUMIN 2.9 (L) 06/03/2025      CKD-MBD  Lab Results   Component Value Date    PTH 60.0 03/18/2024    [id number] 37.3 10/07/2020      Hematology  Recent Labs     06/01/25  0531 06/01/25  2236 06/02/25  0206 06/03/25  0332   WBC 6.01 6.34 6.66 7.40   HGB 9.5* 9.9* 9.7* 10.2*   HCT 29.3* 30.7* 30.3* 32.4*   PLT 89* 57* 56* 51*      Lab Results   Component Value Date    IRON 34 (L) 05/28/2025    TIBC 317 05/28/2025    FERRITIN 190.16 05/28/2025    FOLATE 18.5 04/22/2025    [id number] >2,000 (H) 04/22/2025     (H) 03/18/2024      ID  Lab Results   Component Value Date    HIV Nonreactive 03/19/2024    HEPCAB Nonreactive 05/28/2025      Additional serology  Lab Results   Component Value Date    HGBA1C 5.0 03/18/2024       Urine studies  Lab Results   Component Value Date    APPEARANCEUA Turbid (A) 05/28/2025    SGUA 1.016 05/28/2025    PROTEINUA 2+ (A) 05/28/2025    KETONESUA Negative 05/28/2025    LEUKOCYTESUR 250 (A) 05/28/2025    RBCUA 0-5 05/28/2025    WBCUA 51-99 (A) 05/28/2025    BACTERIA Occasional (A) 05/28/2025    SQEPUA Occasional (A) 05/28/2025    HYALINECASTS None Seen 05/28/2025    CREATRANDUR 28.8 (L) 04/21/2025        Impression  Acute kidney injury   Metabolic alkalosis  Cirrhosis of the liver   Thrombocytopenia   Anemia  Atrial fibrillation   Coronary artery disease  Heart failure with severely reduced ejection fraction   Pulmonary hypertension   Dyslipidemia   Peripheral vascular disease   COPD   Adrenal insufficiency   Alcohol/tobacco abuse    Plan  Acute kidney injury is likely a result of aggressive diuresis. Metabolic alkalosis persistent, suspect likely secondary to volume contraction.   Recommend holding diuretics today and starting 250mg diamox IV for the metabolic alkalosis. CTM  Continue with potassium replacement, utilizing potassium chloride rather than  potassium bicarbonate in setting of concurrent metabolic alkalosis.        Adeline May MD  [employer] , -III

## 2025-06-03 NOTE — CARE UPDATE
Received call from Karina with the [facility], no available bed at this time. Waiting list is extremely long and they will not have an available bed during this admission.

## 2025-06-04 LAB
ACANTHOCYTES (OLG): ABNORMAL
ALBUMIN SERPL-MCNC: 2.9 G/DL (ref 3.4–4.8)
ALBUMIN/GLOB SERPL: 0.7 RATIO (ref 1.1–2)
ALP SERPL-CCNC: 344 UNIT/L (ref 40–150)
ALT SERPL-CCNC: 84 UNIT/L (ref 0–55)
ANION GAP SERPL CALC-SCNC: 14 MEQ/L
ANION GAP SERPL CALC-SCNC: 15 MEQ/L
ANION GAP SERPL CALC-SCNC: 16 MEQ/L
ANISOCYTOSIS BLD QL SMEAR: ABNORMAL
AST SERPL-CCNC: 74 UNIT/L (ref 11–45)
BASOPHILS # BLD AUTO: 0 X10(3)/MCL
BASOPHILS NFR BLD AUTO: 0 %
BILIRUB SERPL-MCNC: 5.6 MG/DL
BUN SERPL-MCNC: 59 MG/DL (ref 8.4–25.7)
BUN SERPL-MCNC: 60.5 MG/DL (ref 8.4–25.7)
BUN SERPL-MCNC: 63.1 MG/DL (ref 8.4–25.7)
CALCIUM SERPL-MCNC: 10.2 MG/DL (ref 8.8–10)
CALCIUM SERPL-MCNC: 10.5 MG/DL (ref 8.8–10)
CALCIUM SERPL-MCNC: 9.9 MG/DL (ref 8.8–10)
CHLORIDE SERPL-SCNC: 92 MMOL/L (ref 98–107)
CHLORIDE SERPL-SCNC: 92 MMOL/L (ref 98–107)
CHLORIDE SERPL-SCNC: 93 MMOL/L (ref 98–107)
CO2 SERPL-SCNC: 33 MMOL/L (ref 23–31)
CO2 SERPL-SCNC: 36 MMOL/L (ref 23–31)
CO2 SERPL-SCNC: 37 MMOL/L (ref 23–31)
CREAT SERPL-MCNC: 1.84 MG/DL (ref 0.72–1.25)
CREAT SERPL-MCNC: 1.9 MG/DL (ref 0.72–1.25)
CREAT SERPL-MCNC: 2.09 MG/DL (ref 0.72–1.25)
CREAT/UREA NIT SERPL: 30
CREAT/UREA NIT SERPL: 32
CREAT/UREA NIT SERPL: 32
EOSINOPHIL # BLD AUTO: 0.03 X10(3)/MCL (ref 0–0.9)
EOSINOPHIL NFR BLD AUTO: 0.4 %
ERYTHROCYTE [DISTWIDTH] IN BLOOD BY AUTOMATED COUNT: 30.2 % (ref 11.5–17)
GFR SERPLBLD CREATININE-BSD FMLA CKD-EPI: 34 ML/MIN/1.73/M2
GFR SERPLBLD CREATININE-BSD FMLA CKD-EPI: 38 ML/MIN/1.73/M2
GFR SERPLBLD CREATININE-BSD FMLA CKD-EPI: 40 ML/MIN/1.73/M2
GLOBULIN SER-MCNC: 3.9 GM/DL (ref 2.4–3.5)
GLUCOSE SERPL-MCNC: 153 MG/DL (ref 82–115)
GLUCOSE SERPL-MCNC: 158 MG/DL (ref 82–115)
GLUCOSE SERPL-MCNC: 181 MG/DL (ref 82–115)
HCT VFR BLD AUTO: 31.2 % (ref 42–52)
HGB BLD-MCNC: 9.6 G/DL (ref 14–18)
HYPOCHROMIA BLD QL SMEAR: ABNORMAL
IMM GRANULOCYTES # BLD AUTO: 0.02 X10(3)/MCL (ref 0–0.04)
IMM GRANULOCYTES NFR BLD AUTO: 0.3 %
LYMPHOCYTES # BLD AUTO: 0.93 X10(3)/MCL (ref 0.6–4.6)
LYMPHOCYTES NFR BLD AUTO: 12.8 %
MAGNESIUM SERPL-MCNC: 2.1 MG/DL (ref 1.6–2.6)
MCH RBC QN AUTO: 25.1 PG (ref 27–31)
MCHC RBC AUTO-ENTMCNC: 30.8 G/DL (ref 33–36)
MCV RBC AUTO: 81.7 FL (ref 80–94)
MONOCYTES # BLD AUTO: 0.51 X10(3)/MCL (ref 0.1–1.3)
MONOCYTES NFR BLD AUTO: 7 %
NEUTROPHILS # BLD AUTO: 5.8 X10(3)/MCL (ref 2.1–9.2)
NEUTROPHILS NFR BLD AUTO: 79.5 %
NRBC BLD AUTO-RTO: 1.8 %
PHOSPHATE SERPL-MCNC: 4.8 MG/DL (ref 2.3–4.7)
PLATELET # BLD AUTO: 51 X10(3)/MCL (ref 130–400)
PLATELET # BLD EST: ABNORMAL 10*3/UL
PMV BLD AUTO: ABNORMAL FL
POIKILOCYTOSIS BLD QL SMEAR: ABNORMAL
POTASSIUM SERPL-SCNC: 2.6 MMOL/L (ref 3.5–5.1)
POTASSIUM SERPL-SCNC: 3.4 MMOL/L (ref 3.5–5.1)
POTASSIUM SERPL-SCNC: 3.6 MMOL/L (ref 3.5–5.1)
PROT SERPL-MCNC: 6.8 GM/DL (ref 5.8–7.6)
RBC # BLD AUTO: 3.82 X10(6)/MCL (ref 4.7–6.1)
RBC MORPH BLD: ABNORMAL
SODIUM SERPL-SCNC: 142 MMOL/L (ref 136–145)
SODIUM SERPL-SCNC: 143 MMOL/L (ref 136–145)
SODIUM SERPL-SCNC: 143 MMOL/L (ref 136–145)
TARGETS BLD QL SMEAR: ABNORMAL
WBC # BLD AUTO: 7.29 X10(3)/MCL (ref 4.5–11.5)

## 2025-06-04 PROCEDURE — 27000249 HC VAPOTHERM CIRCUIT

## 2025-06-04 PROCEDURE — 94660 CPAP INITIATION&MGMT: CPT

## 2025-06-04 PROCEDURE — 63600175 PHARM REV CODE 636 W HCPCS

## 2025-06-04 PROCEDURE — 97530 THERAPEUTIC ACTIVITIES: CPT

## 2025-06-04 PROCEDURE — 25000003 PHARM REV CODE 250

## 2025-06-04 PROCEDURE — 27100171 HC OXYGEN HIGH FLOW UP TO 24 HOURS

## 2025-06-04 PROCEDURE — 27100092 HC HIGH FLOW DELIVERY CANNULA

## 2025-06-04 PROCEDURE — 63700000 PHARM REV CODE 250 ALT 637 W/O HCPCS

## 2025-06-04 PROCEDURE — 84100 ASSAY OF PHOSPHORUS: CPT

## 2025-06-04 PROCEDURE — 80053 COMPREHEN METABOLIC PANEL: CPT

## 2025-06-04 PROCEDURE — 25000003 PHARM REV CODE 250: Performed by: INTERNAL MEDICINE

## 2025-06-04 PROCEDURE — 94799 UNLISTED PULMONARY SVC/PX: CPT

## 2025-06-04 PROCEDURE — 85025 COMPLETE CBC W/AUTO DIFF WBC: CPT

## 2025-06-04 PROCEDURE — 94761 N-INVAS EAR/PLS OXIMETRY MLT: CPT

## 2025-06-04 PROCEDURE — 94640 AIRWAY INHALATION TREATMENT: CPT

## 2025-06-04 PROCEDURE — 25000242 PHARM REV CODE 250 ALT 637 W/ HCPCS

## 2025-06-04 PROCEDURE — 27000207 HC ISOLATION

## 2025-06-04 PROCEDURE — 20000000 HC ICU ROOM

## 2025-06-04 PROCEDURE — 83735 ASSAY OF MAGNESIUM: CPT

## 2025-06-04 PROCEDURE — 36415 COLL VENOUS BLD VENIPUNCTURE: CPT

## 2025-06-04 PROCEDURE — 63600175 PHARM REV CODE 636 W HCPCS: Mod: JZ,TB

## 2025-06-04 PROCEDURE — 97535 SELF CARE MNGMENT TRAINING: CPT

## 2025-06-04 PROCEDURE — 25000003 PHARM REV CODE 250: Performed by: STUDENT IN AN ORGANIZED HEALTH CARE EDUCATION/TRAINING PROGRAM

## 2025-06-04 PROCEDURE — 99900035 HC TECH TIME PER 15 MIN (STAT)

## 2025-06-04 PROCEDURE — 99233 SBSQ HOSP IP/OBS HIGH 50: CPT | Mod: GC,,, | Performed by: INTERNAL MEDICINE

## 2025-06-04 RX ORDER — LANOLIN ALCOHOL/MO/W.PET/CERES
1 CREAM (GRAM) TOPICAL DAILY
Status: DISCONTINUED | OUTPATIENT
Start: 2025-06-04 | End: 2025-06-05 | Stop reason: HOSPADM

## 2025-06-04 RX ORDER — DOBUTAMINE HYDROCHLORIDE 400 MG/100ML
2.5 INJECTION INTRAVENOUS CONTINUOUS
Status: DISCONTINUED | OUTPATIENT
Start: 2025-06-04 | End: 2025-06-05 | Stop reason: HOSPADM

## 2025-06-04 RX ORDER — POTASSIUM CHLORIDE 20 MEQ/1
40 TABLET, EXTENDED RELEASE ORAL ONCE
Status: COMPLETED | OUTPATIENT
Start: 2025-06-04 | End: 2025-06-04

## 2025-06-04 RX ORDER — BUMETANIDE 0.25 MG/ML
4 INJECTION, SOLUTION INTRAMUSCULAR; INTRAVENOUS ONCE
Status: COMPLETED | OUTPATIENT
Start: 2025-06-04 | End: 2025-06-04

## 2025-06-04 RX ORDER — OLANZAPINE 10 MG/2ML
5 INJECTION, POWDER, FOR SOLUTION INTRAMUSCULAR ONCE
Status: COMPLETED | OUTPATIENT
Start: 2025-06-04 | End: 2025-06-04

## 2025-06-04 RX ORDER — POTASSIUM CHLORIDE 7.45 MG/ML
10 INJECTION INTRAVENOUS
Status: COMPLETED | OUTPATIENT
Start: 2025-06-04 | End: 2025-06-04

## 2025-06-04 RX ADMIN — FLUCONAZOLE 100 MG: 100 TABLET ORAL at 10:06

## 2025-06-04 RX ADMIN — POTASSIUM BICARBONATE 40 MEQ: 391 TABLET, EFFERVESCENT ORAL at 10:06

## 2025-06-04 RX ADMIN — APIXABAN 5 MG: 2.5 TABLET, FILM COATED ORAL at 08:06

## 2025-06-04 RX ADMIN — QUETIAPINE FUMARATE 25 MG: 25 TABLET ORAL at 08:06

## 2025-06-04 RX ADMIN — DOBUTAMINE HYDROCHLORIDE 2.5 MCG/KG/MIN: 400 INJECTION INTRAVENOUS at 07:06

## 2025-06-04 RX ADMIN — THIAMINE HCL TAB 100 MG 500 MG: 100 TAB at 10:06

## 2025-06-04 RX ADMIN — FOLIC ACID 1 MG: 1 TABLET ORAL at 10:06

## 2025-06-04 RX ADMIN — ATORVASTATIN CALCIUM 80 MG: 40 TABLET, FILM COATED ORAL at 10:06

## 2025-06-04 RX ADMIN — LACTULOSE 30 G: 20 SOLUTION ORAL at 10:06

## 2025-06-04 RX ADMIN — PANTOPRAZOLE SODIUM 40 MG: 40 TABLET, DELAYED RELEASE ORAL at 10:06

## 2025-06-04 RX ADMIN — POTASSIUM CHLORIDE 10 MEQ: 7.46 INJECTION, SOLUTION INTRAVENOUS at 09:06

## 2025-06-04 RX ADMIN — FERROUS SULFATE TAB 325 MG (65 MG ELEMENTAL FE) 1 EACH: 325 (65 FE) TAB at 10:06

## 2025-06-04 RX ADMIN — OLANZAPINE 5 MG: 10 INJECTION, POWDER, FOR SOLUTION INTRAMUSCULAR at 09:06

## 2025-06-04 RX ADMIN — APIXABAN 5 MG: 2.5 TABLET, FILM COATED ORAL at 10:06

## 2025-06-04 RX ADMIN — POTASSIUM CHLORIDE 40 MEQ: 1500 TABLET, EXTENDED RELEASE ORAL at 05:06

## 2025-06-04 RX ADMIN — POTASSIUM CHLORIDE 10 MEQ: 7.46 INJECTION, SOLUTION INTRAVENOUS at 07:06

## 2025-06-04 RX ADMIN — MUPIROCIN: 20 OINTMENT TOPICAL at 10:06

## 2025-06-04 RX ADMIN — THERA TABS 1 TABLET: TAB at 10:06

## 2025-06-04 RX ADMIN — Medication 1000 UNITS: at 10:06

## 2025-06-04 RX ADMIN — POTASSIUM BICARBONATE 40 MEQ: 391 TABLET, EFFERVESCENT ORAL at 12:06

## 2025-06-04 RX ADMIN — POTASSIUM CHLORIDE 10 MEQ: 7.46 INJECTION, SOLUTION INTRAVENOUS at 08:06

## 2025-06-04 RX ADMIN — MUPIROCIN: 20 OINTMENT TOPICAL at 08:06

## 2025-06-04 RX ADMIN — CLOPIDOGREL BISULFATE 75 MG: 75 TABLET, FILM COATED ORAL at 10:06

## 2025-06-04 RX ADMIN — LACTULOSE 30 G: 20 SOLUTION ORAL at 03:06

## 2025-06-04 RX ADMIN — NOREPINEPHRINE BITARTRATE 0.04 MCG/KG/MIN: 4 INJECTION, SOLUTION INTRAVENOUS at 05:06

## 2025-06-04 RX ADMIN — LACTULOSE 30 G: 20 SOLUTION ORAL at 08:06

## 2025-06-04 RX ADMIN — SERTRALINE HYDROCHLORIDE 50 MG: 50 TABLET ORAL at 10:06

## 2025-06-04 RX ADMIN — POTASSIUM CHLORIDE 10 MEQ: 7.46 INJECTION, SOLUTION INTRAVENOUS at 05:06

## 2025-06-04 RX ADMIN — BUMETANIDE 4 MG: 0.25 INJECTION INTRAMUSCULAR; INTRAVENOUS at 12:06

## 2025-06-04 RX ADMIN — IPRATROPIUM BROMIDE AND ALBUTEROL SULFATE 3 ML: 2.5; .5 SOLUTION RESPIRATORY (INHALATION) at 07:06

## 2025-06-04 NOTE — PLAN OF CARE
Problem: Heart Failure  Goal: Optimal Coping  Outcome: Ongoing     Problem: Adult Inpatient Plan of Care  Goal: Plan of Care Review  Outcome: Ongoing  Goal: Patient-Specific Goal (Individualized)  Outcome: Ongoing  Goal: Optimal Comfort and Wellbeing  Outcome: Ongoing  Goal: Readiness for Transition of Care  Outcome: Ongoing

## 2025-06-04 NOTE — PROGRESS NOTES
"  Nephrology Progress Note    Patient Name: Ran Reyes Jr.  Age: 69 y.o.  : 1955  MRN: [id number]  Admission Date: 2025    Chief complaint: Fatigue and Leg Swelling (PT IN /[facility] W REPORT OF WEAKNESS/SOB AND EDEMA TO LOWER LEGS. STATES HAS NOT EATEN X 18 HRS.  CBG EN ROUTE 137. 20G IV TO LT HAND /EMS.   PT FOUND TO HAVE BED BUGS /[facility]I. EKG TO BE OBTAINED. )    Hospital course  Ran Reyes Jr. is a 69 y.o. Black or  male with past medical history of COPD, hypertension, pulmonary hypertension, dyslipidemia, atrial fibrillation, nonobstructive coronary artery disease, nonischemic cardiomyopathy with severely reduced ejection fraction (22%), peripheral vascular disease, remote history of Claudine's gangrene, glaucoma, and tobacco abuse.  Patient presented to the emergency department on 2025 with complaints of generalized weakness, fatigue, and lower extremity edema.  He was admitted to medicine service for treatment of CHF exacerbation, developed acute kidney injury and multiple electrolyte abnormalities for which Nephrology Service was consulted.    Interval Hx  Patient is resting in bed on bipap. Appears more alert today. Received diamox dose yesterday. Bicarb remains 36. Had 3.5L uop following 4mg bumex yesterday.      Review of Systems  Cardiovascular: Negative for chest pain   Respiratory: Negative for shortness of breath    Objective  /71   Pulse 78   Temp 97.8 °F (36.6 °C) (Oral)   Resp (!) 29   Ht 5' 10" (1.778 m)   Wt 81.9 kg (180 lb 8.9 oz)   SpO2 (!) 91%   BMI 25.91 kg/m²     Intake/Output Summary (Last 24 hours) at 2025 0957  Last data filed at 2025 0712  Gross per 24 hour   Intake 991.32 ml   Output 3575 ml   Net -2583.68 ml       Physical Exam  General appearance: ill appearing. Patient is in no acute distress.  Skin: No rashes or wounds.  HEENT: EOMI, no scleral icterus, no JVD. Neck is supple.  Pulm: Respirations are unlabored. Coarse breath " sounds.   Heart: S1, S2.   Abdomen: Benign.  : Deferred.  Extremities:  Trace bilateral lower extremity edema, unchanged   Neuro: difficult to arouse    Medications  Scheduled Meds:   apixaban  5 mg Oral BID    atorvastatin  80 mg Oral Daily    clopidogreL  75 mg Oral Daily    epoetin deep-ebpx (RETACRIT) injection  10,000 Units Subcutaneous Q7 Days    ferrous sulfate  1 tablet Oral Daily    fluconazole  100 mg Oral Daily    folic acid  1 mg Oral Daily    lactulose  30 g Oral TID    multivitamin  1 tablet Oral Daily    mupirocin   Nasal BID    pantoprazole  40 mg Oral Daily    potassium chloride  10 mEq Intravenous Q1H    QUEtiapine  25 mg Oral QHS    sertraline  50 mg Oral Daily    thiamine  500 mg Oral Daily    vitamin D  1,000 Units Oral Daily     Continuous Infusions:   NORepinephrine bitartrate-D5W  0-3 mcg/kg/min Intravenous Continuous 18.5 mL/hr at 06/04/25 0712 0.06 mcg/kg/min at 06/04/25 0712     PRN Meds:.  Current Facility-Administered Medications:     albuterol-ipratropium, 3 mL, Nebulization, Q4H PRN    dextrose 50%, 12.5 g, Intravenous, PRN    dextrose 50%, 25 g, Intravenous, PRN    glucagon (human recombinant), 1 mg, Intramuscular, PRN    glucose, 16 g, Oral, PRN    glucose, 24 g, Oral, PRN    insulin aspart U-100, 0-5 Units, Subcutaneous, QID (AC + HS) PRN    melatonin, 6 mg, Oral, Nightly PRN    naloxone, 0.02 mg, Intravenous, PRN    sodium chloride 0.9%, 10 mL, Intravenous, Q12H PRN     Imaging:    Reviewed    Laboratory Data:    Chemistry  Recent Labs     06/01/25  2236 06/02/25  0206 06/02/25  1612 06/03/25  0332 06/04/25  0330    139 141 142 143   K 3.0* 2.9* 2.9* 3.7 2.6*   CO2 35* 37* 38* 35* 36*   BUN 73.8* 77.6* 74.9* 70.8* 63.1*   CREATININE 2.06* 2.12* 1.90* 1.94* 2.09*   EGFRNORACEVR 35 33 38 37 34   CALCIUM 9.6 9.9 10.3* 10.0 9.9   MG 2.00 2.30  --  2.10 2.10   PHOS 2.8 3.3  --  3.3 4.8*      LFT  Lab Results   Component Value Date    ALKPHOS 344 (H) 06/04/2025    AST 74 (H)  06/04/2025    ALT 84 (H) 06/04/2025    BILIDIR 3.7 (H) 05/28/2025    IBILI 2.10 (H) 05/28/2025    BILITOT 5.6 (H) 06/04/2025    ALBUMIN 2.9 (L) 06/04/2025      CKD-MBD  Lab Results   Component Value Date    PTH 60.0 03/18/2024    [id number] 37.3 10/07/2020      Hematology  Recent Labs     06/01/25  2236 06/02/25  0206 06/03/25  0332 06/04/25  0330   WBC 6.34 6.66 7.40 7.29   HGB 9.9* 9.7* 10.2* 9.6*   HCT 30.7* 30.3* 32.4* 31.2*   PLT 57* 56* 51* 51*      Lab Results   Component Value Date    IRON 34 (L) 05/28/2025    TIBC 317 05/28/2025    FERRITIN 190.16 05/28/2025    FOLATE 18.5 04/22/2025    [id number] >2,000 (H) 04/22/2025     (H) 03/18/2024      ID  Lab Results   Component Value Date    HIV Nonreactive 03/19/2024    HEPCAB Nonreactive 05/28/2025      Additional serology  Lab Results   Component Value Date    HGBA1C 5.0 03/18/2024       Urine studies  Lab Results   Component Value Date    APPEARANCEUA Turbid (A) 05/28/2025    SGUA 1.016 05/28/2025    PROTEINUA 2+ (A) 05/28/2025    KETONESUA Negative 05/28/2025    LEUKOCYTESUR 250 (A) 05/28/2025    RBCUA 0-5 05/28/2025    WBCUA 51-99 (A) 05/28/2025    BACTERIA Occasional (A) 05/28/2025    SQEPUA Occasional (A) 05/28/2025    HYALINECASTS None Seen 05/28/2025    CREATRANDUR 28.8 (L) 04/21/2025        Impression  Acute kidney injury   Metabolic alkalosis  Cirrhosis of the liver   Thrombocytopenia   Anemia  Atrial fibrillation   Coronary artery disease  Heart failure with severely reduced ejection fraction   Pulmonary hypertension   Dyslipidemia   Peripheral vascular disease   COPD   Adrenal insufficiency   Alcohol/tobacco abuse    Plan  Acute kidney injury is likely a result of aggressive diuresis. Metabolic alkalosis persistent, suspect likely secondary to volume contraction.   Recommend holding diuretics and continuing 250mg diamox IV BID today for the metabolic alkalosis and will reevaluate in the morning   Continue with prn potassium replacement,  utilizing potassium chloride rather than potassium bicarbonate in setting of concurrent metabolic alkalosis.        Adeline May MD  [employer] [facility], -III

## 2025-06-04 NOTE — PROGRESS NOTES
[facility] - ICU  Pulmonary Critical Care Note    Patient Name: Ran Reyes Jr.  MRN: [id number]  Admission Date: 5/23/2025  Hospital Length of Stay: 11 days  Code Status: Full Code  Attending Provider: Dr. Roche  Primary Care Provider: Abiodun Reicnos DO     Subjective:     HPI:   This is a 69 year old male who was initially admitted on 05/23/2025  for generalized weakness, fatigue and bilateral lower extremity edema.  He is also requesting nursing home placement as he has no family assistance.  Of note, patient was recently discharged at [facility] last 05/20/2025 after being treated for cardiogenic shock, adrenal insufficiency.  Since admission, patient is being treated for encephalopathy likely secondary to alcohol use disorder, decompensated heart failure with reduced ejection fraction, and acute kidney injury.  Patient given IV diuresis as blood pressure allows, prednisone taper was continued for adrenal insufficiency, gastroenterology was consulted for cirrhosis (cardiac versus alcohol versus multifactorial) and due to prolonged INR initially rivaroxaban was withheld, nephrology was on board for acute on chronic kidney disease.     Hospital Course/Significant events:  05/23/2025: Admitted at hospital medicine   06/02/2025:  Upgraded to ICU    24 Hour Interval History:  No acute events overnight.  Patient hypokalemic on labs this morning, being repleted.  Patient received Bumex 4 mg yesterday in addition to Diamox 250, net urine output 2.4 L over the past 24 hours. On 0.06 mcg/kg/min of levophed.       Past Medical History:   Diagnosis Date    A-fib     Anticoagulant long-term use     Anxiety     Aortic aneurysm     Cataract     CHF (congestive heart failure)     Chronic atrial fibrillation     COPD (chronic obstructive pulmonary disease)     Coronary artery disease     Depression     HLD (hyperlipidemia)     Hypertension     Mitral regurgitation     PAD (peripheral artery disease)     Primary open angle  glaucoma (POAG)        Past Surgical History:   Procedure Laterality Date    ANGIOGRAM, CORONARY, WITH LEFT HEART CATHETERIZATION N/A 03/26/2024    Procedure: Angiogram, Coronary, with Left Heart Cath;  Surgeon: Adriano Montiel MD;  Location: [facility] CATH LAB;  Service: Cardiology;  Laterality: N/A;    ATHERECTOMY OF PERIPHERAL VESSEL Left 09/12/2022    LEFT SFA ATHERECTOMY, BALLOON ANGIOPLASTY    CATARACT EXTRACTION W/  INTRAOCULAR LENS IMPLANT Left 03/09/2023    Procedure: EXTRACTION, CATARACT, WITH IOL INSERTION;  Surgeon: Georgi Borja MD;  Location: [facility];  Service: Ophthalmology;  Laterality: Left;  19.5  mac    EGD, WITH CLOSED BIOPSY  03/22/2024    Procedure: EGD, WITH CLOSED BIOPSY;  Surgeon: Ronald Calderon MD;  Location: [facility] ENDOSCOPY;  Service: Gastroenterology;;    ESOPHAGOGASTRODUODENOSCOPY N/A 03/22/2024    Procedure: EGD;  Surgeon: Ronald Calderon MD;  Location: [facility] ENDOSCOPY;  Service: Gastroenterology;  Laterality: N/A;    Heart Stent N/A     > 10yrs. AGO    INCISION AND DRAINAGE N/A 03/18/2024    Procedure: Incision and Drainage;  Surgeon: Fahad Rivas MD;  Location: [facility];  Service: Urology;  Laterality: N/A;  I&D SCROTAL ABSCESS    INSERTION OF STENT INTO PERIPHERAL VESSEL Right 10/17/2022    RIGHT SFA ATHERECTOMY, BALLOON ANGIOPLASTY, STENT; RIGHT ANTERIOR TIBIAL ARTERY ATHERECTOMY, BALLOON ANGIOPLASTY    ORCHIECTOMY Left 03/18/2024    Procedure: ORCHIECTOMY;  Surgeon: Fahad Rivas MD;  Location: [facility];  Service: Urology;  Laterality: Left;       Social History[1]    Current Outpatient Medications   Medication Instructions    acetaminophen 650 mg, 2 times daily PRN    albuterol (PROVENTIL/VENTOLIN HFA) 90 mcg/actuation inhaler 2 puffs, Inhalation, Every 6 hours PRN, Rescue    albuterol-ipratropium (DUO-NEB) 2.5 mg-0.5 mg/3 mL nebulizer solution 3 mLs, Nebulization, Every 6 hours PRN, Rescue    atorvastatin (LIPITOR) 80 mg, Oral, Daily     BREZTRI AEROSPHERE 160-9-4.8 mcg/actuation HFAA 2 puffs, 2 times daily    bumetanide (BUMEX) 1 mg, Oral, Daily PRN    clopidogreL (PLAVIX) 75 mg, Oral, Daily    diphenhydrAMINE (BENADRYL) 25 mg, Oral, Every 6 hours PRN    folic acid (FOLVITE) 1 mg, Oral, Daily    gabapentin (NEURONTIN) 300 mg, Oral, 3 times daily    olopatadine (PATANOL) 0.1 % ophthalmic solution Apply to eye.    pantoprazole (PROTONIX) 40 mg, Oral, Daily    potassium chloride SA (K-DUR,KLOR-CON) 20 MEQ tablet 20 mEq, Oral, 2 times daily    predniSONE (DELTASONE) 5 MG tablet Take 4 tablets (20 mg total) by mouth 2 (two) times daily for 7 days, THEN 3 tablets (15 mg total) 2 (two) times daily for 7 days, THEN 2 tablets (10 mg total) 2 (two) times daily for 7 days, THEN 1 tablet (5 mg total) 2 (two) times daily for 7 days.    QUEtiapine (SEROQUEL) 100 mg, Oral, Nightly    rivaroxaban (XARELTO) 20 mg, Oral, Daily    senna-docusate (PERICOLACE) 8.6-50 mg per tablet 1 tablet, Oral, 2 times daily PRN    sertraline (ZOLOFT) 100 mg, Oral, Daily    sulfamethoxazole-trimethoprim 800-160mg (BACTRIM DS) 800-160 mg Tab 1 tablet, Oral, 2 times daily    [Paused] urea (CARMOL) 40 % Crea Apply topically.    [Paused] varenicline tartrate (CHANTIX) 0.5 mg    vitamin D (VITAMIN D3) 1,000 Units, Daily     Current Inpatient Medications   apixaban  5 mg Oral BID    atorvastatin  80 mg Oral Daily    clopidogreL  75 mg Oral Daily    epoetin deep-ebpx (RETACRIT) injection  10,000 Units Subcutaneous Q7 Days    ferrous sulfate  1 tablet Oral Daily    fluconazole  100 mg Oral Daily    folic acid  1 mg Oral Daily    lactulose  30 g Oral TID    multivitamin  1 tablet Oral Daily    mupirocin   Nasal BID    pantoprazole  40 mg Oral Daily    potassium chloride  10 mEq Intravenous Q1H    QUEtiapine  25 mg Oral QHS    sertraline  50 mg Oral Daily    thiamine  500 mg Oral Daily    vitamin D  1,000 Units Oral Daily     Current Intravenous Infusions   NORepinephrine bitartrate-D5W  0-3  mcg/kg/min Intravenous Continuous 18.5 mL/hr at 06/04/25 0712 0.06 mcg/kg/min at 06/04/25 0712       Objective:     Intake/Output Summary (Last 24 hours) at 6/4/2025 0718  Last data filed at 6/4/2025 0712  Gross per 24 hour   Intake 1226.32 ml   Output 3575 ml   Net -2348.68 ml     Vital Signs (Most Recent):  Temp: 97.4 °F (36.3 °C) (06/04/25 0400)  Pulse: 72 (06/04/25 0630)  Resp: 20 (06/04/25 0630)  BP: (!) 69/43 (06/04/25 0630)  SpO2: (!) 93 % (06/04/25 0630)  Body mass index is 25.91 kg/m².  Weight: 81.9 kg (180 lb 8.9 oz) Vital Signs (24h Range):  Temp:  [96.7 °F (35.9 °C)-97.8 °F (36.6 °C)] 97.4 °F (36.3 °C)  Pulse:  [] 72  Resp:  [18-41] 20  SpO2:  [86 %-100 %] 93 %  BP: ()/(43-92) 69/43     Physical Exam  Constitutional:       General: He is not in acute distress.     Appearance: He is ill-appearing.   HENT:      Head: Normocephalic and atraumatic.   Eyes:      Extraocular Movements: Extraocular movements intact.      Conjunctiva/sclera: Conjunctivae normal.      Pupils: Pupils are equal, round, and reactive to light.   Cardiovascular:      Rate and Rhythm: Normal rate and regular rhythm.      Heart sounds: Murmur heard.   Pulmonary:      Breath sounds: No wheezing.      Comments: On bipap, course breath sounds.   Abdominal:      Comments: Distended   Musculoskeletal:      Cervical back: Normal range of motion and neck supple.      Right lower leg: No edema.      Left lower leg: No edema.   Neurological:      Mental Status: He is alert and oriented to person, place, and time.           Lines/Drains/Airways       Drain  Duration                  Rectal Tube 06/03/25 0234 rectal tube w/ balloon (indicate number of mLs) 1 day              Peripheral Intravenous Line  Duration                  Peripheral IV - Single Lumen 05/23/25 1838 20 G Left;Posterior Hand 11 days         Peripheral IV - Single Lumen 06/02/25 0015 18 G Anterior;Proximal;Right Upper Arm 2 days                  Significant  Labs:  Lab Results   Component Value Date    WBC 7.29 06/04/2025    HGB 9.6 (L) 06/04/2025    HCT 31.2 (L) 06/04/2025    MCV 81.7 06/04/2025    PLT 51 (L) 06/04/2025       BMP  Lab Results   Component Value Date     06/04/2025    K 2.6 (LL) 06/04/2025    CO2 36 (H) 06/04/2025    BUN 63.1 (H) 06/04/2025    CREATININE 2.09 (H) 06/04/2025    CALCIUM 9.9 06/04/2025    AGAP 15.0 06/04/2025    EGFRNONAA 88 (L) 12/06/2021     ABG  Recent Labs   Lab 06/03/25  0715   PH 7.520*   PO2 93.0   PCO2 54.0*   HCO3 44.1*     Mechanical Ventilation Support:  Oxygen Concentration (%): 60 (06/04/25 0311)    Significant Imaging:  I have reviewed the pertinent imaging within the past 24 hours.    Assessment/Plan:     Assessment  Acute hypoxic respiratory failure likely secondary to decompensated heart failure  CXR 6/1/25:  Increased pulmonary markings bilaterally most consistent with pulmonary edema  Cirrhosis with hepatic encephalopathy   Elevated ammonia  Transaminitis  Thrombocytopenia  Decompensated heart failure with reduced ejection fraction 22%  Echo 04/30/2025:  Severe global hypokinesis with severely reduced systolic function EF of 22%, grade 3 diastolic dysfunction  Adrenal insufficiency on steroid taper  Acute on chronic kidney disease  Hyponatremia-improved  Hypokalemia  Chronic microcytic anemia  Iron-deficiency  Worsening thrombocytopenia  Atrial fibrillation  Nonobstructive epicardial coronary artery disease  Elevated troponin  Chronic obstructive pulmonary disease  Bedbug infestation     Plan  Admited to ICU for closer monitoring.  CTA to rule out pulmonary embolism in the setting of hypoxemia and currently not on VTE prophylaxis (previously on Xarelto but was stopped due to prolonged INR) - showed significant volume overload  Continue oxygen supplementation to maintain saturation 88-92%  On levo for pressure support.  Titrate to maintain MAP > 65. Currently on levo 0.06 mcg/kg/min.   Repeat echocardiogram shows  severe mitral regurgitation.   Cardiology following, appreciate assistance.  Recommendations for continued diuresis.    No SGLT2 due to history of Claudine's.    Unable to tolerate GDMT at this time.  GI following, appreciate assistance.    Okay to resume anticoagulation from GI standpoint.  Likely cirrhosis-cardiac 1st alcohol versus multifactorial.    Continue lactulose, titrate 2-3 bowel movements daily.  Nephrology following, appreciate assistance.    Recommendations to hold diuretics, started Diamox 250 mg IV.  Continue prednisone taper  Taper off medications contributing to somnolence  Thrombocytopenia stable from yesterday.  No signs of active bleeding.  Continue to monitor.   Hold initiation of empiric antibiotics for now, pt had  previous cultures with multi-drug resistant E coli  Diflucan course ; 200 then 100 x 2 days     DVT Prophylaxis: SCD  GI Prophylaxis: Protonix      32 minutes of critical care was time spent personally by me on the following activities: development of treatment plan with patient or surrogate and bedside caregivers, discussions with consultants, evaluation of patient's response to treatment, examination of patient, ordering and performing treatments and interventions, ordering and review of laboratory studies, ordering and review of radiographic studies, pulse oximetry, re-evaluation of patient's condition.  This critical care time did not overlap with that of any other provider or involve time for any procedures.     Kavin Pastrana MD  [facility], PGY-2    Pulmonary & Critical Care Medicine  [facility] - ICU         [1]   Social History  Socioeconomic History    Marital status: Single   Tobacco Use    Smoking status: Every Day     Current packs/day: 0.50     Average packs/day: 0.8 packs/day for 50.4 years (41.0 ttl pk-yrs)     Types: Cigarettes     Start date: 1975     Passive exposure: Current    Smokeless tobacco: Never    Tobacco comments:     States smoke  2-3 cigarettes per day   Substance and Sexual Activity    Alcohol use: Yes     Alcohol/week: 3.0 standard drinks of alcohol     Types: 3 Cans of beer per week     Comment: socially    Drug use: Not Currently     Frequency: 1.0 times per week     Types: Marijuana     Comment: no use in over 1 year    Sexual activity: Not Currently     Partners: Female     Social Drivers of Health     Financial Resource Strain: Low Risk  (5/24/2025)    Overall Financial Resource Strain (CARDIA)     Difficulty of Paying Living Expenses: Not hard at all   Food Insecurity: No Food Insecurity (5/24/2025)    Hunger Vital Sign     Worried About Running Out of Food in the Last Year: Never true     Ran Out of Food in the Last Year: Never true   Transportation Needs: No Transportation Needs (5/24/2025)    PRAPARE - Transportation     Lack of Transportation (Medical): No     Lack of Transportation (Non-Medical): No   Physical Activity: Inactive (5/24/2025)    Exercise Vital Sign     Days of Exercise per Week: 0 days     Minutes of Exercise per Session: 0 min   Stress: No Stress Concern Present (5/24/2025)    [employer] - Occupational Stress Questionnaire     Feeling of Stress : Not at all   Housing Stability: Low Risk  (5/24/2025)    Housing Stability Vital Sign     Unable to Pay for Housing in the Last Year: No     Homeless in the Last Year: No

## 2025-06-04 NOTE — PT/OT/SLP PROGRESS
Occupational Therapy   Treatment    Name: Ran Reyes Jr.  MRN: [id number]  Admitting Diagnosis:       1. Chronic congestive heart failure, unspecified heart failure type    2. Liver cirrhosis    3. Congestive heart failure, unspecified HF chronicity, unspecified heart failure type    4. Weakness    5. Longstanding persistent atrial fibrillation    6. ELIZABETH (acute kidney injury)    7. Acute cystitis without hematuria    8. Unable to care for self    9. Chest pain    10. Elevated troponin    11. Arteriosclerosis of coronary artery    12. Hepatic cirrhosis, unspecified hepatic cirrhosis type, unspecified whether ascites present    13. Anemia in chronic kidney disease, unspecified CKD stage    14. Hypokalemia    15. Hyponatremia    16. Tobacco dependency    17. SOB (shortness of breath)    18. Routine check-up    Problem List[1]   Recommendations:     Discharge Recommendations: Low Intensity Therapy (hospice ?)  Discharge Equipment Recommendations:  to be determined by next level of care  Barriers to discharge:  Decreased caregiver support, Other (Comment) (medical dx, fall risk, severity of deficits, level of skilled assist)    Assessment:     Ran Reyes Jr. is a 69 y.o. male with a medical diagnosis of chronic CHF.  He presents with generalized weakness and poor activity tolerance impacting performance during self care and mobility attempts.  Overall min improved tolerance of activity this session as requested to sit EOB and sat with SBA statically ~ 10 min .     Performance deficits affecting function are weakness, impaired endurance, impaired self care skills, impaired functional mobility, gait instability, impaired balance, decreased upper extremity function, decreased lower extremity function, pain, decreased ROM, impaired coordination, impaired fine motor, impaired cardiopulmonary response to activity.     Rehab Prognosis:  Fair; patient would benefit from acute skilled OT services to address these  deficits and reach maximum level of function.       Plan:     Patient to be seen 3 x/week to address the above listed problems via self-care/home management, therapeutic activities, therapeutic exercises, neuromuscular re-education  Plan of Care Expires:  (d/c)  Plan of Care Reviewed with: patient    Subjective     Chief Complaint: weakness and pain due to rectal tube in place  Patient/Family Comments/goals: none stated   Pain/Comfort:  Pain Rating 1: 10/10  Location 1: perirectal (due to rectal tube)  Pain Addressed 1: Nurse notified  Pain Rating Post-Intervention 1: 10/10    Objective:     Communicated with: Nurse Spencer  prior to session.  Patient found HOB elevated with blood pressure cuff, bowel management system, oxygen, PureWick, peripheral IV, pulse ox (continuous), SCD, telemetry upon OT entry to room.    General Precautions: Standard, contact, fall    Orthopedic Precautions:N/A  Braces: N/A  Respiratory Status: 20L O2 via vapotherm 80%   VITALS  Preactivity  Post activity  /73  137/67  HR 98  94  O2 92%  94%    Occupational Performance:     Bed Mobility:    Patient completed Supine to Sit with moderate assistance  Patient completed Sit to Supine with mod/max assistance      Functional Mobility/Transfers:    Semi sit to stand with max A to lift buttocks off bed to allow nurse to reposition jessica pads and draw sheet      Activities of Daily Living:  Feeding:  Stand by assistance while upright in bed to feed cup of peaches with spoon and stand by assist while seated EOB to grasp, bring to mouth and drink bottle of ensure        Treatment & Education:  Pt sat EOB with SBA ~ 10 min while drinking ensure and taking meds with nurse ;    While upright in bed, Pt completed 1 x 10 reps AAROM  B shoulder frontal raises      Patient left HOB elevated with all lines intact, call button in reach, and nurse  notified    GOALS:   Multidisciplinary Problems       Occupational Therapy Goals          Problem: Occupational  Therapy    Goal Priority Disciplines Outcome Interventions   Occupational Therapy Goal     OT, PT/OT Progressing    Description: Goals to be met by: DC     Patient will increase functional independence with ADLs by performing:    Reeval 6/2/25 due to upgrade to ICU and goals revised     Feeding in supported sitting  modified independent after assisted with set up .  UE Dressing with minimal assistance seated EOB .   LE Dressing with moderate assistance .  Grooming while seated at sink with stand by assistance  .  Toileting from bedside commode with Moderate assistance  for hygiene and clothing management.   Toilet step transfer to bedside commode with min  assistance  using RW.  Increase B UE strength to 3+/5 throughout as needed to impact self care performance                          DME Justifications:  TBD pending progress    Time Tracking:     OT Date of Treatment: 06/04/25  OT Start Time: 1011  OT Stop Time: 1038  OT Total Time (min): 27 min    Billable Minutes:Self Care/Home Management 15 min   Therapeutic Activity 12 min     OT/ALLAN: OT          6/4/2025       [1]   Patient Active Problem List  Diagnosis    Primary open angle glaucoma (POAG) of right eye, moderate stage    Combined forms of age-related cataract of left eye    Arteriosclerosis of coronary artery    Chronic atrial fibrillation    Dyslipidemia    Hypertension    Tobacco use    PVD (peripheral vascular disease)    VHD (valvular heart disease)    COVID-19    HFrEF (heart failure with reduced ejection fraction)    Nonrheumatic mitral valve regurgitation    Postoperative eye state    Scrotal hematoma    Chronic obstructive pulmonary disease, unspecified    Lesion of external ear    Low back pain    Other thrombophilia    Secondary hyperaldosteronism    Positive colorectal cancer screening using Cologuard test    Acute heart failure    History of COPD    CHF (congestive heart failure)    Acute cystitis with hematuria    Tobacco dependency    Liver  cirrhosis    Hypokalemia    Hyponatremia    Unable to care for self

## 2025-06-04 NOTE — PROGRESS NOTES
Gastroenterology/Hepatology Progress Note    Patient Name: Ran Reyes Jr.  Age: 69 y.o.  : 1955  MRN: [id number]  Admission Date: 2025      Self, Aaareferral    SUBJECTIVE:   No acute events overnight.  Patient transitioned from BiPAP to Vapotherm and is oxygenating well.  Upon evaluation, the patient has no complaints.  H&H is stable.  Platelet count is 51 today.  BUN and creatinine remain mostly unchanged overnight, hovering around 60 and 1.8 respectively.  PT INR repeated on  following discontinuation DOAC and went from 35.2 and 3.4 to 14.9 and 1.2 respectively.  Patient is eating during evaluation.  Has a rectal tube in place, stool appears normal.  No other issues at this time.    Review of patient's allergies indicates:  No Known Allergies       INPATIENT MEDICATIONS    Scheduled Meds:   apixaban  5 mg Oral BID    atorvastatin  80 mg Oral Daily    clopidogreL  75 mg Oral Daily    epoetin deep-ebpx (RETACRIT) injection  10,000 Units Subcutaneous Q7 Days    ferrous sulfate  1 tablet Oral Daily    fluconazole  100 mg Oral Daily    folic acid  1 mg Oral Daily    lactulose  30 g Oral TID    multivitamin  1 tablet Oral Daily    mupirocin   Nasal BID    pantoprazole  40 mg Oral Daily    QUEtiapine  25 mg Oral QHS    sertraline  50 mg Oral Daily    thiamine  500 mg Oral Daily    vitamin D  1,000 Units Oral Daily     Continuous Infusions:   NORepinephrine bitartrate-D5W  0-3 mcg/kg/min Intravenous Continuous 12.3 mL/hr at 25 1036 0.04 mcg/kg/min at 25 1036     PRN Meds:    Current Facility-Administered Medications:     albuterol-ipratropium, 3 mL, Nebulization, Q4H PRN    dextrose 50%, 12.5 g, Intravenous, PRN    dextrose 50%, 25 g, Intravenous, PRN    glucagon (human recombinant), 1 mg, Intramuscular, PRN    glucose, 16 g, Oral, PRN    glucose, 24 g, Oral, PRN    insulin aspart U-100, 0-5 Units, Subcutaneous, QID (AC + HS) PRN    melatonin, 6 mg, Oral, Nightly PRN    naloxone, 0.02 mg,  Intravenous, PRN    sodium chloride 0.9%, 10 mL, Intravenous, Q12H PRN          Review of Systems:       Review of Systems   Constitutional:  Positive for activity change, appetite change and fatigue. Negative for unexpected weight change.   HENT:  Negative for trouble swallowing.    Respiratory:  Positive for shortness of breath.    Cardiovascular:  Positive for leg swelling.   Gastrointestinal:  Negative for abdominal pain, blood in stool, change in bowel habit, constipation and diarrhea.   Musculoskeletal: Negative.    Neurological: Negative.    Hematological: Negative.    Psychiatric/Behavioral: Negative.     All other systems reviewed and are negative.         Objective:       VITAL SIGNS: 24 HR MIN & MAX LAST    Temp  Min: 96.7 °F (35.9 °C)  Max: 98.4 °F (36.9 °C)  98.4 °F (36.9 °C)        BP  Min: 65/47  Max: 141/92  (!) 81/55     Pulse  Min: 50  Max: 113  92     Resp  Min: 16  Max: 35  (!) 23    SpO2  Min: 86 %  Max: 100 %  96 %        Physical Exam  Vitals reviewed.   Constitutional:       Appearance: He is ill-appearing.   HENT:      Head: Normocephalic and atraumatic.      Mouth/Throat:      Mouth: Mucous membranes are moist.   Eyes:      Extraocular Movements: Extraocular movements intact.      Conjunctiva/sclera: Conjunctivae normal.   Cardiovascular:      Rate and Rhythm: Normal rate and regular rhythm.   Pulmonary:      Effort: Accessory muscle usage present.      Breath sounds: Rhonchi present.   Abdominal:      General: Bowel sounds are normal.      Palpations: Abdomen is soft.      Tenderness: There is no abdominal tenderness.   Musculoskeletal:      Cervical back: Normal range of motion.      Right lower le+ Pitting Edema present.      Left lower le+ Pitting Edema present.   Skin:     General: Skin is warm and dry.   Neurological:      General: No focal deficit present.      Mental Status: He is oriented to person, place, and time. He is lethargic.   Psychiatric:         Mood and Affect:  "Mood normal.         Behavior: Behavior normal.          LABS:    Recent Labs   Lab 06/01/25 0531 06/01/25 2236 06/02/25  0206 06/03/25  0332 06/04/25  0330   WBC 6.01 6.34 6.66 7.40 7.29   HGB 9.5* 9.9* 9.7* 10.2* 9.6*   HCT 29.3* 30.7* 30.3* 32.4* 31.2*   PLT 89* 57* 56* 51* 51*   MCV 77.9* 79.1* 78.9* 81.2 81.7       Recent Labs   Lab 05/29/25  0422 05/30/25  0421 05/31/25 0329 06/01/25 0531 06/01/25 2236 06/02/25 0206 06/03/25 0332 06/04/25  0330   HGB 7.8* 8.0* 8.7* 9.5* 9.9* 9.7* 10.2* 9.6*   HCT 23.7* 24.3* 26.6* 29.3* 30.7* 30.3* 32.4* 31.2*        Recent Labs   Lab 06/02/25  1612 06/03/25  0332 06/04/25  0330 06/04/25  1120 06/04/25  1449    142 143 143 142   K 2.9* 3.7 2.6* 3.6 3.4*   CO2 38* 35* 36* 37* 33*   BUN 74.9* 70.8* 63.1* 60.5* 59.0*   CREATININE 1.90* 1.94* 2.09* 1.90* 1.84*   BILITOT  --  4.9* 5.6*  --   --    ALKPHOS  --  430* 344*  --   --    AST  --  104* 74*  --   --    ALT  --  107* 84*  --   --    ALBUMIN  --  2.9* 2.9*  --   --         Recent Labs   Lab 05/31/25 0329 06/01/25 2238   INR 1.9* 1.2   PROTIME 22.4* 14.9*        No results for input(s): "IRON", "FERRITIN" in the last 168 hours.           IMAGING:   X-Ray Chest 1 View  Result Date: 6/3/2025  EXAMINATION: XR CHEST 1 VIEW CLINICAL HISTORY: hypoxia; TECHNIQUE: Single frontal view of the chest was performed. COMPARISON: 06/01/2025 FINDINGS: LINES AND TUBES: EKG/telemetry leads overlie the chest. MEDIASTINUM AND MARTA: Cardiac silhouette is enlarged. LUNGS: Diffuse interstitial opacities with mild progression at the left upper lung zone compared to prior. PLEURA:No large pleural effusion by portable radiographic evaluation.No pneumothorax. OTHER: No acute osseous abnormality.     Mildly progressed interstitial opacities. Electronically signed by: Sailaja Atkinson Date:    06/03/2025 Time:    10:42    Echo Saline Bubble? No  Result Date: 6/2/2025    Left Ventricle: The left ventricle is moderately dilated. LVEDD 6.6 " cm. There is severely reduced systolic function with a visually estimated ejection fraction of 20 - 25%. There is diastolic dysfunction.   Right Ventricle: Systolic function is mildly reduced.   Mitral Valve: There is moderate (2+) regurgitation.   Tricuspid Valve: There is mild to moderate (1-2+)regurgitation.   Pulmonary Artery: The estimated pulmonary artery systolic pressure is 53 mmHg.     CTA Chest Non-Coronary (PE Studies)  Result Date: 6/2/2025  Technique: CT Scan of the chest was performed with intravenous contrast with direct axial images as well as sagittal and coronal reconstruction images pulmonary embolus protocol.  MIP images are also performed Dosage Information: Automated Exposure Control was utilized 1420.75 mGy.cm. Comparison: Comparison is with study dated May 3, 2025 13:18:10. Clinical History: Pe suspected. Findings: Mediastinum: Multiple subcentimeter mediastinal lymph nodes are seen paratracheal subcarinal and bilateral hilar spaces . Heart: Cardiomegaly is seen.  Coronary artery calcification is seen. Aorta: No aortic dissection or aneurysm is seen. Moderate aortic calcification is seen in the thoracic aorta. Tortuousity thoracic aorta. Pulmonary Arteries: No filling defects are seen in the pulmonary arteries to suggest pulmonary embolus. Lungs: There are stable right greater than the left bilateral pleural effusions with adjacent atelectasis. There is interval increase in ground glass opacity in both lungs with more prominent interlobular septal thickening, more pronounced in the bilateral upper lobes. Bony Structures: Spine: Mild spondylolytic changes are seen in the thoracic spine. Ribs: The visualized ribs appear unremarkable. Abdomen: The visualized upper abdominal organs appear unremarkable.     Impression: 1.  Cardiomegaly. 2. No filling defects are seen in the pulmonary arteries to suggest pulmonary embolus. 3. There are stable right greater than the left bilateral pleural effusions  with adjacent atelectasis. There is interval increase in ground glass opacity in both lungs with more prominent interlobular septal thickening, more pronounced in the bilateral upper lobes. This may represent congestive changes with an infectious element not entirely excluded. Correlate with clinical and laboratory findings as regards further evaluation and follow-up. 4. Details and other findings as discussed above. No significant discrepancy with overnight report. Electronically signed by: Mickey West Date:    06/02/2025 Time:    08:18    CT Head Without Contrast  Result Date: 6/2/2025  EXAMINATION: CT HEAD WITHOUT CONTRAST CLINICAL HISTORY: Mental status change, unknown cause; TECHNIQUE: CT imaging of the head performed from the skull base to the vertex without intravenous contrast.  DLP 1421 mGycm. Automatic exposure control, adjustment of mA/kV or iterative reconstruction technique was used to reduce radiation. COMPARISON: 13 May 2025 FINDINGS: There is no acute cortical infarct, hemorrhage or mass lesion.  No new parenchymal attenuation abnormality.  Ventricular size is stable.  There are vascular calcifications. Visualized paranasal sinuses and mastoid air cells are clear.     No acute intracranial findings. No significant discrepancy with the preliminary report. Electronically signed by: Jerald Stephens Date:    06/02/2025 Time:    07:32    X-Ray Chest 1 View  Result Date: 6/1/2025  EXAMINATION: XR CHEST 1 VIEW CLINICAL HISTORY: Sob; TECHNIQUE: Single frontal view of the chest was performed. COMPARISON: 05/23/2025 FINDINGS: There is blunting of the right costophrenic angle cannot rule out small effusion there increased markings bilaterally most consistent with pulmonary edema.  Heart is enlarged.     Changes most consistent with pulmonary edema Electronically signed by: Kirill Zavala MD Date:    06/01/2025 Time:    22:23    US Retroperitoneal Complete  Result Date: 5/28/2025  EXAMINATION: US RETROPERITONEAL  COMPLETE CLINICAL HISTORY: ELIZABETH;   weakness TECHNIQUE: Ultrasound of the kidneys and urinary bladder was performed including color flow and grayscale evaluation of the kidneys. COMPARISON: CT chest abdomen pelvis 05/03/2025 FINDINGS: RIGHT KIDNEY: The right kidney measures 10.5 cm.  No hydronephrosis. LEFT KIDNEY: The left kidney measures 9.2 cm. No hydronephrosis.  Lobulated contour. BLADDER: Bladder volume calculated 325 mL. OTHER: Aorta and IVC not imaged.     Normal sized, nonobstructed kidneys. Electronically signed by: Sailaja Atkinson Date:    05/28/2025 Time:    14:55    US Abdomen Limited  Result Date: 5/28/2025  EXAMINATION: US ABDOMEN LIMITED CLINICAL HISTORY: Liver cirrhosis; TECHNIQUE: Limited ultrasound of the right upper quadrant of the abdomen was performed. COMPARISON: CT chest abdomen pelvis 05/03/2025 FINDINGS: LIVER: 20 cm cranial caudal at the midclavicular line. Nodular surface contour.  No focal mass appreciable. Portal vein is patent with appropriate directional flow.  Biphasic flow at the portal vein as can be seen with heart failure or portal hypertension. PANCREAS: Obscured by overlying bowel gas. GALLBLADDER: Gallbladder is contracted.  No appreciable shadowing gallstone. BILE DUCTS: 3 mm common bile duct.  Distal common bile duct is obscured by shadowing bowel gas. INFERIOR VENA CAVA: Normal in appearance. RIGHT KIDNEY: Images of the right kidney were performed on concurrent retroperitoneal sonogram.  See separate report. OTHER: Small volume perihepatic free fluid.     Hepatomegaly.  Nodular hepatic surface contour Small volume perihepatic free fluid Biphasic flow at the portal vein as can be seen with heart failure or portal hypertension Electronically signed by: Sialaja Atkinson Date:    05/28/2025 Time:    14:54    X-Ray Chest 1 View  Result Date: 5/23/2025  EXAMINATION XR CHEST 1 VIEW CLINICAL HISTORY weakness; TECHNIQUE A total of 1 frontal image(s) submitted of the chest. COMPARISON  4 May 2025 FINDINGS Lines/tubes/devices: ECG leads overlie the imaged region.  Right PICC no longer visualized. There is similar enlargement the cardiac silhouette, central vascular congestion, and widespread lung interstitial changes.  The trachea is midline. No new or worsening consolidation is developed in the interval.  There is no large pleural effusion or convincing pneumothorax. Regional osseous structures and extrathoracic soft tissues are similar. IMPRESSION No acute process or other adverse interval change.  Chronic secondary findings discussed above. Electronically signed by: Ramana Mcleod Date:    05/23/2025 Time:    19:54    CT Head Without Contrast  Result Date: 5/13/2025  EXAMINATION: CT HEAD WITHOUT CONTRAST INDICATION: Mental status change, unknown cause; TECHNIQUE: Contiguous axial images are acquired through the head without IV contrast.  These images were reconstructed into the coronal and sagittal plane.  Automated exposure control, dose radiation lowering technique was utilized. COMPARISON: Head CT from 12/05/2024 FINDINGS: Hemorrhage: No acute intracranial hemorrhage is seen. Brain parenchyma: Subtle stable appearing microvascular change is seen in portions of the periventricular and deep white matter tracts. Cerebellum: Unremarkable. Vascular: Unremarkable venous sinuses. Sella and skull base: The sella appears to be within normal limits for age. Intracranial calcifications: Incidental note is made of bilateral choroid plexus calcification. Incidental note is made of some pineal region calcification. Incidental note is made of some calcification of the falx. Calvarium: No acute linear or depressed skull fracture is seen. Maxillofacial Structures: Paranasal sinuses: The visualized paranasal sinuses appear clear with no mucoperiosteal thickening or air fluid levels identified. Orbits: The orbits appear unremarkable. Zygomatic arches: The zygomatic arches are intact and unremarkable. Temporal  bones and mastoids: The temporal bones and mastoids appear unremarkable. TMJ: The mandibular condyles appear normally placed with respect to the mandibular fossa. Nasal Bones: No displaced nasal bone fracture is seen. Visualized upper cervical spine: The visualized cervical spine appears unremarkable.     1. No evidence of acute intracranial abnormality. [employer] concurrence Electronically signed by: Harris Chaudhry MD Date:    05/13/2025 Time:    07:45    Echo  Result Date: 5/9/2025    Mitral Valve: There is severe regurgitation with a centrally directed jet.         Assessment / Plan:     Ran Reyes Jr. is a 69 y.o. male with nonischmic CM (EF 22%), diastolic HF with moderate Pulm HTN (PASP 59mmHg), pAF on Xarelto, PAD s/p stent on plavix, COPD, tobacco use admitted for need for nursing home placement.    GI consulted for concern for liver disease.      Likely cirrhosis - cardiac vs alcohol vs multifactorial  No biliary concern at this time - gallbladder contracted, CBD 3 mm  Likely does have a component of congestive hepatopathy Hyponatremia resolved - combination of beer use, liver and cardiac disease     - MELD-Na: 24 (improved - driven by renal function, TB)  - CTP: C   - INR was 3.4 - recommended holding due to supratherapeutic level and uptrending.  INR now 1.2, has not been restarted, but shows that liver synthetic function is good and INR elevation due to DOAC  - OK to resume anticoagulation from GI standpoint.  - Needs complete alcohol cessation  - PETH negative  - Treat underlying cardiac issues - diuresis/volume management per nephrology and cardiology     Ascites: none  Encephalopathy: waxing and waning mentation over the weekend.  Now on lactulose - titrate to 2-3 Bms a day.  Remains alert and oriented x 4 today  Varices: None on EGD in March 2024  HCC screening: US without lesion.  AFP 4.8     2. Acute on chronic KI  - Has been getting diuresis since admission - management by nephrology  -  Creatinine continuing to uptrend and now with volume contraction along with hypoxia       3. Anemia, MCV low  - Ferritin borderline in the past (30s)  - No overt bleeding  - On plavix and xarelto - INR 1.2  - No prior colonoscopy  - Prior EGD with inflammation - on ppi  - Ferritin 190 now but likely mixed picture of NANDO and AoCD.    - Recommend iron supplementation     Disposition:  Patient will need to be followed in liver clinic post discharge.  Recommend complete alcohol cessation.  Cirrhosis appears multifactorial, and with improvement in PT/INR when DOACs stopped, less likely to have severe liver dysfunction.  Continue current management per primary team, and liver function should improve with treatment of other comorbidities.  We will sign off at this time.  Thank you for the consult.     Boby Guadarrama MD  [facility] IM PGY-3, GI

## 2025-06-05 VITALS
DIASTOLIC BLOOD PRESSURE: 65 MMHG | BODY MASS INDEX: 25.21 KG/M2 | HEIGHT: 70 IN | TEMPERATURE: 99 F | SYSTOLIC BLOOD PRESSURE: 115 MMHG | OXYGEN SATURATION: 100 % | HEART RATE: 69 BPM | RESPIRATION RATE: 25 BRPM | WEIGHT: 176.13 LBS

## 2025-06-05 PROBLEM — J80 ARDS (ADULT RESPIRATORY DISTRESS SYNDROME): Status: ACTIVE | Noted: 2025-06-05

## 2025-06-05 LAB
ALBUMIN SERPL-MCNC: 2.8 G/DL (ref 3.4–4.8)
ALBUMIN/GLOB SERPL: 0.7 RATIO (ref 1.1–2)
ALP SERPL-CCNC: 379 UNIT/L (ref 40–150)
ALT SERPL-CCNC: 68 UNIT/L (ref 0–55)
ANION GAP SERPL CALC-SCNC: 13 MEQ/L
ANION GAP SERPL CALC-SCNC: 14 MEQ/L
AST SERPL-CCNC: 57 UNIT/L (ref 11–45)
BASOPHILS # BLD AUTO: 0.01 X10(3)/MCL
BASOPHILS NFR BLD AUTO: 0.2 %
BILIRUB SERPL-MCNC: 4.7 MG/DL
BUN SERPL-MCNC: 45.2 MG/DL (ref 8.4–25.7)
BUN SERPL-MCNC: 51.2 MG/DL (ref 8.4–25.7)
CALCIUM SERPL-MCNC: 9.7 MG/DL (ref 8.8–10)
CALCIUM SERPL-MCNC: 9.9 MG/DL (ref 8.8–10)
CHLORIDE SERPL-SCNC: 95 MMOL/L (ref 98–107)
CHLORIDE SERPL-SCNC: 96 MMOL/L (ref 98–107)
CO2 SERPL-SCNC: 31 MMOL/L (ref 23–31)
CO2 SERPL-SCNC: 33 MMOL/L (ref 23–31)
CREAT SERPL-MCNC: 1.64 MG/DL (ref 0.72–1.25)
CREAT SERPL-MCNC: 1.82 MG/DL (ref 0.72–1.25)
CREAT/UREA NIT SERPL: 25
CREAT/UREA NIT SERPL: 31
EOSINOPHIL # BLD AUTO: 0.05 X10(3)/MCL (ref 0–0.9)
EOSINOPHIL NFR BLD AUTO: 0.8 %
ERYTHROCYTE [DISTWIDTH] IN BLOOD BY AUTOMATED COUNT: 30.8 % (ref 11.5–17)
GFR SERPLBLD CREATININE-BSD FMLA CKD-EPI: 40 ML/MIN/1.73/M2
GFR SERPLBLD CREATININE-BSD FMLA CKD-EPI: 46 ML/MIN/1.73/M2
GLOBULIN SER-MCNC: 3.9 GM/DL (ref 2.4–3.5)
GLUCOSE SERPL-MCNC: 151 MG/DL (ref 82–115)
GLUCOSE SERPL-MCNC: 196 MG/DL (ref 82–115)
HCT VFR BLD AUTO: 31.5 % (ref 42–52)
HGB BLD-MCNC: 9.9 G/DL (ref 14–18)
HOLD SPECIMEN: NORMAL
HOLD SPECIMEN: NORMAL
IMM GRANULOCYTES # BLD AUTO: 0.03 X10(3)/MCL (ref 0–0.04)
IMM GRANULOCYTES NFR BLD AUTO: 0.5 %
LYMPHOCYTES # BLD AUTO: 1.11 X10(3)/MCL (ref 0.6–4.6)
LYMPHOCYTES NFR BLD AUTO: 17.4 %
MAGNESIUM SERPL-MCNC: 2 MG/DL (ref 1.6–2.6)
MCH RBC QN AUTO: 25.4 PG (ref 27–31)
MCHC RBC AUTO-ENTMCNC: 31.4 G/DL (ref 33–36)
MCV RBC AUTO: 81 FL (ref 80–94)
MONOCYTES # BLD AUTO: 0.55 X10(3)/MCL (ref 0.1–1.3)
MONOCYTES NFR BLD AUTO: 8.6 %
NEUTROPHILS # BLD AUTO: 4.63 X10(3)/MCL (ref 2.1–9.2)
NEUTROPHILS NFR BLD AUTO: 72.5 %
NRBC BLD AUTO-RTO: 0.5 %
PHOSPHATE SERPL-MCNC: 2.8 MG/DL (ref 2.3–4.7)
PLATELET # BLD AUTO: 44 X10(3)/MCL (ref 130–400)
PLATELETS.RETICULATED NFR BLD AUTO: 6.3 % (ref 0.9–11.2)
PMV BLD AUTO: ABNORMAL FL
POTASSIUM SERPL-SCNC: 2.4 MMOL/L (ref 3.5–5.1)
POTASSIUM SERPL-SCNC: 3.2 MMOL/L (ref 3.5–5.1)
PROT SERPL-MCNC: 6.7 GM/DL (ref 5.8–7.6)
RBC # BLD AUTO: 3.89 X10(6)/MCL (ref 4.7–6.1)
SODIUM SERPL-SCNC: 140 MMOL/L (ref 136–145)
SODIUM SERPL-SCNC: 142 MMOL/L (ref 136–145)
WBC # BLD AUTO: 6.38 X10(3)/MCL (ref 4.5–11.5)

## 2025-06-05 PROCEDURE — 25000003 PHARM REV CODE 250

## 2025-06-05 PROCEDURE — 85025 COMPLETE CBC W/AUTO DIFF WBC: CPT

## 2025-06-05 PROCEDURE — 94640 AIRWAY INHALATION TREATMENT: CPT

## 2025-06-05 PROCEDURE — 94761 N-INVAS EAR/PLS OXIMETRY MLT: CPT

## 2025-06-05 PROCEDURE — 84100 ASSAY OF PHOSPHORUS: CPT

## 2025-06-05 PROCEDURE — 63700000 PHARM REV CODE 250 ALT 637 W/O HCPCS

## 2025-06-05 PROCEDURE — 36415 COLL VENOUS BLD VENIPUNCTURE: CPT

## 2025-06-05 PROCEDURE — 97535 SELF CARE MNGMENT TRAINING: CPT

## 2025-06-05 PROCEDURE — 27100171 HC OXYGEN HIGH FLOW UP TO 24 HOURS

## 2025-06-05 PROCEDURE — 83735 ASSAY OF MAGNESIUM: CPT

## 2025-06-05 PROCEDURE — 25000242 PHARM REV CODE 250 ALT 637 W/ HCPCS

## 2025-06-05 PROCEDURE — 80053 COMPREHEN METABOLIC PANEL: CPT

## 2025-06-05 PROCEDURE — 94799 UNLISTED PULMONARY SVC/PX: CPT

## 2025-06-05 PROCEDURE — 99900035 HC TECH TIME PER 15 MIN (STAT)

## 2025-06-05 PROCEDURE — 63600175 PHARM REV CODE 636 W HCPCS

## 2025-06-05 RX ORDER — POTASSIUM CHLORIDE 7.45 MG/ML
10 INJECTION INTRAVENOUS
Status: COMPLETED | OUTPATIENT
Start: 2025-06-05 | End: 2025-06-05

## 2025-06-05 RX ADMIN — POTASSIUM CHLORIDE 10 MEQ: 7.46 INJECTION, SOLUTION INTRAVENOUS at 10:06

## 2025-06-05 RX ADMIN — LACTULOSE 30 G: 20 SOLUTION ORAL at 02:06

## 2025-06-05 RX ADMIN — THERA TABS 1 TABLET: TAB at 09:06

## 2025-06-05 RX ADMIN — POTASSIUM CHLORIDE 10 MEQ: 7.46 INJECTION, SOLUTION INTRAVENOUS at 09:06

## 2025-06-05 RX ADMIN — POTASSIUM CHLORIDE 10 MEQ: 7.46 INJECTION, SOLUTION INTRAVENOUS at 08:06

## 2025-06-05 RX ADMIN — APIXABAN 5 MG: 2.5 TABLET, FILM COATED ORAL at 09:06

## 2025-06-05 RX ADMIN — POTASSIUM CHLORIDE 10 MEQ: 7.46 INJECTION, SOLUTION INTRAVENOUS at 12:06

## 2025-06-05 RX ADMIN — LACTULOSE 30 G: 20 SOLUTION ORAL at 09:06

## 2025-06-05 RX ADMIN — NOREPINEPHRINE BITARTRATE 0.02 MCG/KG/MIN: 4 INJECTION, SOLUTION INTRAVENOUS at 09:06

## 2025-06-05 RX ADMIN — PANTOPRAZOLE SODIUM 40 MG: 40 TABLET, DELAYED RELEASE ORAL at 09:06

## 2025-06-05 RX ADMIN — POTASSIUM CHLORIDE 10 MEQ: 7.46 INJECTION, SOLUTION INTRAVENOUS at 01:06

## 2025-06-05 RX ADMIN — FLUCONAZOLE 100 MG: 100 TABLET ORAL at 09:06

## 2025-06-05 RX ADMIN — Medication 1000 UNITS: at 09:06

## 2025-06-05 RX ADMIN — MUPIROCIN: 20 OINTMENT TOPICAL at 09:06

## 2025-06-05 RX ADMIN — IPRATROPIUM BROMIDE AND ALBUTEROL SULFATE 3 ML: 2.5; .5 SOLUTION RESPIRATORY (INHALATION) at 06:06

## 2025-06-05 RX ADMIN — POTASSIUM CHLORIDE 10 MEQ: 7.46 INJECTION, SOLUTION INTRAVENOUS at 02:06

## 2025-06-05 RX ADMIN — CLOPIDOGREL BISULFATE 75 MG: 75 TABLET, FILM COATED ORAL at 09:06

## 2025-06-05 RX ADMIN — POTASSIUM CHLORIDE 10 MEQ: 7.46 INJECTION, SOLUTION INTRAVENOUS at 05:06

## 2025-06-05 RX ADMIN — FERROUS SULFATE TAB 325 MG (65 MG ELEMENTAL FE) 1 EACH: 325 (65 FE) TAB at 09:06

## 2025-06-05 RX ADMIN — THIAMINE HCL TAB 100 MG 500 MG: 100 TAB at 09:06

## 2025-06-05 RX ADMIN — FOLIC ACID 1 MG: 1 TABLET ORAL at 09:06

## 2025-06-05 RX ADMIN — SERTRALINE HYDROCHLORIDE 50 MG: 50 TABLET ORAL at 09:06

## 2025-06-05 RX ADMIN — POTASSIUM CHLORIDE 10 MEQ: 7.46 INJECTION, SOLUTION INTRAVENOUS at 11:06

## 2025-06-05 RX ADMIN — IPRATROPIUM BROMIDE AND ALBUTEROL SULFATE 3 ML: 2.5; .5 SOLUTION RESPIRATORY (INHALATION) at 10:06

## 2025-06-05 RX ADMIN — ATORVASTATIN CALCIUM 80 MG: 40 TABLET, FILM COATED ORAL at 09:06

## 2025-06-05 NOTE — PT/OT/SLP PROGRESS
Occupational Therapy   Treatment    Name: Ran Reyes Jr.  MRN: [id number]  Admitting Diagnosis:       1. Chronic congestive heart failure, unspecified heart failure type    2. Liver cirrhosis    3. Congestive heart failure, unspecified HF chronicity, unspecified heart failure type    4. Weakness    5. Longstanding persistent atrial fibrillation    6. ELIZABETH (acute kidney injury)    7. Acute cystitis without hematuria    8. Unable to care for self    9. Chest pain    10. Elevated troponin    11. Arteriosclerosis of coronary artery    12. Hepatic cirrhosis, unspecified hepatic cirrhosis type, unspecified whether ascites present    13. Anemia in chronic kidney disease, unspecified CKD stage    14. Hypokalemia    15. Hyponatremia    16. Tobacco dependency    17. SOB (shortness of breath)    18. Routine check-up    Problem List[1]   Recommendations:     Discharge Recommendations: Low Intensity Therapy ([facility] care /hospice?)  Discharge Equipment Recommendations:  to be determined by next level of care  Barriers to discharge:  Decreased caregiver support (medical dx, fall risk, severity of deficits, level of skilled assist)    Assessment:     Ran Reyes Jr. is a 69 y.o. male with a medical diagnosis of see above.  He presents with limited tolerance to EOB activity this am and participated in self care training upright in bed and fed himself ~20 % of meal ( limited appetite)with min A due to UE and hand weakness and tremors.     Performance deficits affecting function are weakness, impaired endurance, impaired self care skills, impaired functional mobility, gait instability, impaired balance, decreased upper extremity function, decreased lower extremity function, pain, decreased ROM, impaired coordination, impaired fine motor, impaired cardiopulmonary response to activity.     Rehab Prognosis:  Fair; patient would benefit from acute skilled OT services to address these deficits and reach maximum level of  function.       Plan:     Patient to be seen 3 x/week to address the above listed problems via self-care/home management, therapeutic activities, therapeutic exercises, neuromuscular re-education  Plan of Care Expires:  (d/c)  Plan of Care Reviewed with: patient    Subjective     Chief Complaint: weakness and fatigue   Patient/Family Comments/goals: none stated   Pain/Comfort:  Pain Rating 1: 2/10  Location 1: neck  Pain Addressed 1: Reposition  Pain Rating Post-Intervention 1: 0/10    Objective:     Communicated with: Nurse Spencer prior to session.  Patient found HOB elevated with blood pressure cuff, bowel management system, oxygen, PureWick, peripheral IV, pulse ox (continuous), SCD, telemetry upon OT entry to room.    General Precautions: Standard, contact, fall    Orthopedic Precautions:N/A  Braces: N/A  Respiratory Status:  20L O2 via vapotherm 80%   Preactivity( HOB ~ 45 degrees   Upright 90 degrees in bed   BP 94/58     87/46  O2 100%     100%  Occupational Performance:     Bed Mobility:    Repositioning in bed- max x 2 persons with assist of  drawsheet      Activities of Daily Living:  Feeding:  minimum assistance ~ 20 % of meal to include solids and liquids and impacted by decreased UE and hand strength and tremors     Treatment & Education:  Pt. educated on OT goals, POC, orientation to environment, use of call bell for assist with transfers OOB or for any other needs due to fall risk.     Patient left HOB elevated with all lines intact, call button in reach, and nurse  notified    GOALS:   Multidisciplinary Problems       Occupational Therapy Goals          Problem: Occupational Therapy    Goal Priority Disciplines Outcome Interventions   Occupational Therapy Goal     OT, PT/OT Progressing    Description: Goals to be met by: DC     Patient will increase functional independence with ADLs by performing:    Reeval 6/2/25 due to upgrade to ICU and goals revised     Feeding in supported sitting  modified  independent after assisted with set up .  UE Dressing with minimal assistance seated EOB .   LE Dressing with moderate assistance .  Grooming while seated at sink with stand by assistance  .  Toileting from bedside commode with Moderate assistance  for hygiene and clothing management.   Toilet step transfer to bedside commode with min  assistance  using RW.  Increase B UE strength to 3+/5 throughout as needed to impact self care performance                          DME Justifications:  TBD pending progress     Time Tracking:     OT Date of Treatment: 06/05/25  OT Start Time: 0842  OT Stop Time: 0857  OT Total Time (min): 15 min    Billable Minutes:Self Care/Home Management 15 min     OT/ALLAN: OT          6/5/2025       [1]   Patient Active Problem List  Diagnosis    Primary open angle glaucoma (POAG) of right eye, moderate stage    Combined forms of age-related cataract of left eye    Arteriosclerosis of coronary artery    Chronic atrial fibrillation    Dyslipidemia    Hypertension    Tobacco use    PVD (peripheral vascular disease)    VHD (valvular heart disease)    COVID-19    HFrEF (heart failure with reduced ejection fraction)    Nonrheumatic mitral valve regurgitation    Postoperative eye state    Scrotal hematoma    Chronic obstructive pulmonary disease, unspecified    Lesion of external ear    Low back pain    Other thrombophilia    Secondary hyperaldosteronism    Positive colorectal cancer screening using Cologuard test    Acute heart failure    History of COPD    CHF (congestive heart failure)    Acute cystitis with hematuria    Tobacco dependency    Liver cirrhosis    Hypokalemia    Hyponatremia    Unable to care for self

## 2025-06-05 NOTE — PROGRESS NOTES
"  Nephrology Progress Note    Patient Name: Ran Reyes Jr.  Age: 69 y.o.  : 1955  MRN: [id number]  Admission Date: 2025    Chief complaint: Fatigue and Leg Swelling (PT IN /[facility] W REPORT OF WEAKNESS/SOB AND EDEMA TO LOWER LEGS. STATES HAS NOT EATEN X 18 HRS.  CBG EN ROUTE 137. 20G IV TO LT HAND /EMS.   PT FOUND TO HAVE BED BUGS /[facility]. EKG TO BE OBTAINED. )    Hospital course  Ran Reyes Jr. is a 69 y.o. Black or  male with past medical history of COPD, hypertension, pulmonary hypertension, dyslipidemia, atrial fibrillation, nonobstructive coronary artery disease, nonischemic cardiomyopathy with severely reduced ejection fraction (22%), peripheral vascular disease, remote history of Claudine's gangrene, glaucoma, and tobacco abuse.  Patient presented to the emergency department on 2025 with complaints of generalized weakness, fatigue, and lower extremity edema.  He was admitted to medicine service for treatment of CHF exacerbation, developed acute kidney injury and multiple electrolyte abnormalities for which Nephrology Service was consulted.    Interval Hx  Patient is resting in bed. Dobutamine drip started yesterday. Renal function improving. Appears more alert today. Received diamox dose yesterday. Bicarb improved to 33. Had 2.9L uop following 4mg bumex yesterday.      Review of Systems  Cardiovascular: Negative for chest pain   Respiratory: Negative for shortness of breath    Objective  BP (!) 78/40   Pulse 74   Temp 98.5 °F (36.9 °C)   Resp (!) 26   Ht 5' 10" (1.778 m)   Wt 79.9 kg (176 lb 2.4 oz)   SpO2 97%   BMI 25.27 kg/m²     Intake/Output Summary (Last 24 hours) at 2025 0952  Last data filed at 2025 0800  Gross per 24 hour   Intake 1447.66 ml   Output 4000 ml   Net -2552.34 ml       Physical Exam  General appearance: ill appearing. Patient is in no acute distress.  Skin: No rashes or wounds.  HEENT: EOMI, no scleral icterus, no JVD. Neck is " supple.  Pulm: Respirations are unlabored. Coarse breath sounds.   Heart: S1, S2.   Abdomen: Benign.  : Deferred.  Extremities:  Trace bilateral lower extremity edema, unchanged   Neuro: difficult to arouse    Medications  Scheduled Meds:   apixaban  5 mg Oral BID    atorvastatin  80 mg Oral Daily    clopidogreL  75 mg Oral Daily    epoetin deep-ebpx (RETACRIT) injection  10,000 Units Subcutaneous Q7 Days    ferrous sulfate  1 tablet Oral Daily    folic acid  1 mg Oral Daily    lactulose  30 g Oral TID    multivitamin  1 tablet Oral Daily    mupirocin   Nasal BID    pantoprazole  40 mg Oral Daily    potassium chloride  10 mEq Intravenous Q1H    QUEtiapine  25 mg Oral QHS    sertraline  50 mg Oral Daily    thiamine  500 mg Oral Daily    vitamin D  1,000 Units Oral Daily     Continuous Infusions:   DOBUTamine IV infusion (non-titrating)  2.5 mcg/kg/min Intravenous Continuous 3.1 mL/hr at 06/05/25 0530 2.5 mcg/kg/min at 06/05/25 0530    NORepinephrine bitartrate-D5W  0-3 mcg/kg/min Intravenous Continuous 6.2 mL/hr at 06/05/25 0939 0.02 mcg/kg/min at 06/05/25 0939     PRN Meds:.  Current Facility-Administered Medications:     albuterol-ipratropium, 3 mL, Nebulization, Q4H PRN    dextrose 50%, 12.5 g, Intravenous, PRN    dextrose 50%, 25 g, Intravenous, PRN    glucagon (human recombinant), 1 mg, Intramuscular, PRN    glucose, 16 g, Oral, PRN    glucose, 24 g, Oral, PRN    insulin aspart U-100, 0-5 Units, Subcutaneous, QID (AC + HS) PRN    melatonin, 6 mg, Oral, Nightly PRN    naloxone, 0.02 mg, Intravenous, PRN    sodium chloride 0.9%, 10 mL, Intravenous, Q12H PRN     Imaging:    Reviewed    Laboratory Data:    Chemistry  Recent Labs     06/03/25  0332 06/04/25  0330 06/04/25  1120 06/04/25  1449 06/05/25  0311    143 143 142 142   K 3.7 2.6* 3.6 3.4* 2.4*   CO2 35* 36* 37* 33* 33*   BUN 70.8* 63.1* 60.5* 59.0* 51.2*   CREATININE 1.94* 2.09* 1.90* 1.84* 1.64*   EGFRNORACEVR 37 34 38 40 46   CALCIUM 10.0 9.9 10.5*  10.2* 9.9   MG 2.10 2.10  --   --  2.00   PHOS 3.3 4.8*  --   --  2.8      LFT  Lab Results   Component Value Date    ALKPHOS 379 (H) 06/05/2025    AST 57 (H) 06/05/2025    ALT 68 (H) 06/05/2025    BILIDIR 3.7 (H) 05/28/2025    IBILI 2.10 (H) 05/28/2025    BILITOT 4.7 (H) 06/05/2025    ALBUMIN 2.8 (L) 06/05/2025      CKD-MBD  Lab Results   Component Value Date    PTH 60.0 03/18/2024    [id number] 37.3 10/07/2020      Hematology  Recent Labs     06/03/25  0332 06/04/25  0330 06/05/25  0311   WBC 7.40 7.29 6.38   HGB 10.2* 9.6* 9.9*   HCT 32.4* 31.2* 31.5*   PLT 51* 51* 44*      Lab Results   Component Value Date    IRON 34 (L) 05/28/2025    TIBC 317 05/28/2025    FERRITIN 190.16 05/28/2025    FOLATE 18.5 04/22/2025    [id number] >2,000 (H) 04/22/2025     (H) 03/18/2024      ID  Lab Results   Component Value Date    HIV Nonreactive 03/19/2024    HEPCAB Nonreactive 05/28/2025      Additional serology  Lab Results   Component Value Date    HGBA1C 5.0 03/18/2024       Urine studies  Lab Results   Component Value Date    APPEARANCEUA Turbid (A) 05/28/2025    SGUA 1.016 05/28/2025    PROTEINUA 2+ (A) 05/28/2025    KETONESUA Negative 05/28/2025    LEUKOCYTESUR 250 (A) 05/28/2025    RBCUA 0-5 05/28/2025    WBCUA 51-99 (A) 05/28/2025    BACTERIA Occasional (A) 05/28/2025    SQEPUA Occasional (A) 05/28/2025    HYALINECASTS None Seen 05/28/2025    CREATRANDUR 28.8 (L) 04/21/2025        Impression  Acute kidney injury   Metabolic alkalosis  Cirrhosis of the liver   Thrombocytopenia   Anemia  Atrial fibrillation   Coronary artery disease  Heart failure with severely reduced ejection fraction   Pulmonary hypertension   Dyslipidemia   Peripheral vascular disease   COPD   Adrenal insufficiency   Alcohol/tobacco abuse    Plan  Acute kidney injury is likely a result of aggressive diuresis. Metabolic alkalosis persistent, suspect likely secondary to volume contraction.   Recommend holding diuretics do to potential to worsen  alkalosis, however will defer to primary team.   Recommend continuing 250mg diamox IV once today for the metabolic alkalosis and will reevaluate in the morning   Renal function improving.  Continue with prn potassium replacement, utilizing potassium chloride rather than potassium bicarbonate in setting of concurrent metabolic alkalosis.        Adeline May MD  [facility] , [facility]

## 2025-06-05 NOTE — NURSING
Patient has been accepted by Dr. Latham at [facility] in [place].  Patient will be transferred to ICU Bed 311. Report called to Sosa (accepting nurse at [facility]). [facility] at bedside preparing to transport patient. Patient's sister (Yesenia Reyes ) was called but did not get an answer.  Will attempt to call again.

## 2025-06-05 NOTE — CARE UPDATE
Transfer Center called. Discussed case with Hospitalist, Dr. Latham, at [facility]. He agrees to admit the patient to their ICU. There are no ICU beds available at the moment, but the plan is to move the patient down there once one becomes available.     Kishan Soto MD  [facility], PGY-2

## 2025-06-05 NOTE — DISCHARGE SUMMARY
[facility] Discharge Summary    Admitting Physician: Vignesh Guaman MD  Attending Physician: Merle Sun MD  Date of Admit: 5/23/2025  Date of Discharge: 6/5/2025    Condition: Stable  Outcome: Transferring  for higher level of care  DISPOSITION: Another Health Care Institution Not Defined      Discharge Diagnoses     Problem List[1]    Principal Problem:  ARDS (adult respiratory distress syndrome)    Consultants and Procedures     Consultants:  IP CONSULT TO HOSPITAL MEDICINE  IP CONSULT TO SOCIAL WORK/CASE MANAGEMENT  IP CONSULT TO NEPHROLOGY  IP CONSULT TO GI  IP CONSULT TO CARDIOLOGY    Procedures:   * No surgery found *     Brief Admission History      This is a 69 year old male, with hx of NICM-HFrEF (LVEF 20-25%; G3DD), HTN, NOCAD, AF on OAC, PAD, HLD, COPD, severe MR and history of VT arrest (3/18/2024 - post op) who was initially admitted on 05/23/2025  for generalized weakness, fatigue and bilateral lower extremity edema.  He is also requesting nursing home placement as he has no family assistance.  Of note, patient was recently discharged at [facility] last 05/20/2025 after being treated for cardiogenic shock, adrenal insufficiency.  Since admission, patient is being treated for encephalopathy likely secondary to alcohol use disorder, decompensated heart failure with reduced ejection fraction, and acute kidney injury.  Patient given IV diuresis as blood pressure allows, prednisone taper was continued for adrenal insufficiency, gastroenterology was consulted for cirrhosis (cardiac versus alcohol versus multifactorial) and due to prolonged INR initially rivaroxaban was withheld, nephrology was on board for acute on chronic kidney disease.     Hospital Course with Pertinent Findings     Hospital Course/Significant events:  05/23/2025: Admitted at hospital medicine for ARDS  06/02/2025:  Upgraded to ICU     24 Hour Interval History:  No acute events overnight. AO x 4 this morning.  "On HFNC, 20L. Patient hypokalemic on labs this morning, being repleted.  Patient received Bumex 4 mg yesterday in addition to Diamox 250, net urine output -2.1 L over the past 24 hours. On 0.06 mcg/kg/min of levophed. Edema improved. He communicates that he would like to be transferred for a higher level of care.     Discharge physical exam:  Vitals  BP: (!) 98/58  Temp: 98.6 °F (37 °C)  Temp Source: Oral  Pulse: 82  Resp: 13  SpO2: 100 %  Height: 5' 10" (177.8 cm)  Weight: 79.9 kg (176 lb 2.4 oz)    Physical Exam  Constitutional:       General: He is not in acute distress.     Appearance: He is ill-appearing.   HENT:      Head: Normocephalic and atraumatic.   Eyes:      Extraocular Movements: Extraocular movements intact.      Conjunctiva/sclera: Conjunctivae normal.      Pupils: Pupils are equal, round, and reactive to light.   Cardiovascular:      Rate and Rhythm: Normal rate and regular rhythm.      Heart sounds: Murmur heard.   Pulmonary:      Breath sounds: No wheezing.      Comments: On hfnc, course breath sounds.   Abdominal:      Comments: Distended   Musculoskeletal:      Cervical back: Normal range of motion and neck supple.      Right lower leg: No edema.      Left lower leg: No edema.   Neurological:      Mental Status: He is alert and oriented to person, place, and time.       TIME SPENT ON DISCHARGE: 60 minutes    Discharge Medications        Medication List          acetaminophen 325 mg Cap     albuterol 90 mcg/actuation inhaler  Commonly known as: PROVENTIL/VENTOLIN HFA     albuterol-ipratropium 2.5 mg-0.5 mg/3 mL nebulizer solution  Commonly known as: DUO-NEB     atorvastatin 80 MG tablet  Commonly known as: LIPITOR     PATRICIA AEROSPHERE 160-9-4.8 mcg/actuation Hfaa  Generic drug: budesonide-glycopyr-formoterol     bumetanide 1 MG tablet  Commonly known as: BUMEX     clopidogreL 75 mg tablet  Commonly known as: PLAVIX     diphenhydrAMINE 25 mg capsule  Commonly known as: BENADRYL     folic acid 1 " MG tablet  Commonly known as: FOLVITE     gabapentin 300 MG capsule  Commonly known as: NEURONTIN     olopatadine 0.1 % ophthalmic solution  Commonly known as: PATANOL     pantoprazole 40 MG tablet  Commonly known as: PROTONIX     potassium chloride SA 20 MEQ tablet  Commonly known as: K-DUR,KLOR-CON     predniSONE 5 MG tablet  Commonly known as: DELTASONE     QUEtiapine 100 MG Tab  Commonly known as: SEROQUEL     rivaroxaban 20 mg Tab  Commonly known as: XARELTO     senna-docusate 8.6-50 mg per tablet  Commonly known as: PERICOLACE     sertraline 100 MG tablet  Commonly known as: ZOLOFT     sulfamethoxazole-trimethoprim 800-160mg 800-160 mg Tab  Commonly known as: BACTRIM DS     urea 40 % Crea  Commonly known as: CARMOL     varenicline tartrate 1 mg Tab  Commonly known as: CHANTIX     vitamin D 1000 units Tab  Commonly known as: VITAMIN D3              Discharge Information:     ICU to ICU transfer for HFrEF. Pt would like evaluation for further cardiac interventions  Palliative/Hospice options briefly discussed, but patient is not interested.           Future Appointments   Date Time Provider Department Center   6/16/2025 10:45 AM [facility]        Kishan Soto MD  [facility], PGY-2               [1]   Patient Active Problem List  Diagnosis    Primary open angle glaucoma (POAG) of right eye, moderate stage    Combined forms of age-related cataract of left eye    Arteriosclerosis of coronary artery    Chronic atrial fibrillation    Dyslipidemia    Hypertension    Tobacco use    PVD (peripheral vascular disease)    VHD (valvular heart disease)    COVID-19    HFrEF (heart failure with reduced ejection fraction)    Nonrheumatic mitral valve regurgitation    Postoperative eye state    Scrotal hematoma    Chronic obstructive pulmonary disease, unspecified    Lesion of external ear    Low back pain    Other thrombophilia    Secondary hyperaldosteronism    Positive colorectal  cancer screening using Cologuard test    Acute heart failure    History of COPD    CHF (congestive heart failure)    Acute cystitis with hematuria    Tobacco dependency    Moderate malnutrition    Liver cirrhosis    Hypokalemia    Hyponatremia    Unable to care for self    ARDS (adult respiratory distress syndrome)

## 2025-06-05 NOTE — PT/OT/SLP PROGRESS
Physical Therapy    Missed Treatment Session - cancel note - 06/05/2025    Patient Name:  Ran Reyes Jr.   MRN:  [id number]      Patient not seen at this time secondary to Therapist assessment (Pt wanted to try to sit EOB to eat breakfast so OT called PT in to assist. But upon PT attempt BP was dropping to pt returned to bed. OT to stay in room to assist with breakfast/feeding in the bed.).    Will follow-up as patient is appropriate/available/agreeable to participate and as therapists' schedule allows.

## 2025-06-05 NOTE — PLAN OF CARE
Problem: Heart Failure  Goal: Optimal Coping  Outcome: Ongoing  Goal: Stable Heart Rate and Rhythm  Outcome: Ongoing  Goal: Optimal Functional Ability  Outcome: Ongoing     Problem: Adult Inpatient Plan of Care  Goal: Plan of Care Review  Outcome: Ongoing  Goal: Patient-Specific Goal (Individualized)  Outcome: Ongoing  Goal: Absence of Hospital-Acquired Illness or Injury  Outcome: Ongoing  Goal: Optimal Comfort and Wellbeing  Outcome: Ongoing  Goal: Readiness for Transition of Care  Outcome: Ongoing

## 2025-06-05 NOTE — PROGRESS NOTES
Cardiology Progress Note     Cardiology Attending: Dayanna Johnson MD  Cardiology Fellow: Vernon Cifuentes MD     Date of Admit: 5/23/2025    History of Present Illness:      Ran Reyes Jr. is a 69 y.o. male with NICM-HFrEF (LVEF 20-25%; G3DD), HTN, NOCAD, AF on OAC, PAD, HLD, COPD, and history of VT arrest (3/18/2024 - post op) who presented with resource instability following recent discharged for acute on chronic decompensated heart failure now with progressively worsening decompensated heart failure and suspected cirrhosis.     Cardiology consulted to assist with management - MR is severe and chronic in nature.     The patient is without acute complaint this morning. He denies chest pain, dyspnea (though on Vapotherm), nausea, emesis, or abdominal pain.    The patient is amenable to being transferred to [facility] for advanced heart failure evaluation.     Vitals reviewed. Intermittent asymptomatic hypotension - patient denies lightheadedness or dizziness.   Labs reviewed. Stable normocytic anemia (has recently received IV Fe) and persistent thrombocytopenia. Hypokalemia (2.4). cAG for albumin (17). Improving sCr and transaminitis improving.     Past Medical History:     Past Medical History:   Diagnosis Date    A-fib     Anticoagulant long-term use     Anxiety     Aortic aneurysm     Cataract     CHF (congestive heart failure)     Chronic atrial fibrillation     COPD (chronic obstructive pulmonary disease)     Coronary artery disease     Depression     HLD (hyperlipidemia)     Hypertension     Mitral regurgitation     PAD (peripheral artery disease)     Primary open angle glaucoma (POAG)      Surgical History:     Past Surgical History:   Procedure Laterality Date    ANGIOGRAM, CORONARY, WITH LEFT HEART CATHETERIZATION N/A 03/26/2024    Procedure: Angiogram, Coronary, with Left Heart Cath;  Surgeon: Adriano Montiel MD;  Location: [facility] CATH LAB;  Service: Cardiology;  Laterality: N/A;    ATHERECTOMY OF PERIPHERAL  VESSEL Left 09/12/2022    LEFT SFA ATHERECTOMY, BALLOON ANGIOPLASTY    CATARACT EXTRACTION W/  INTRAOCULAR LENS IMPLANT Left 03/09/2023    Procedure: EXTRACTION, CATARACT, WITH IOL INSERTION;  Surgeon: Georgi Borja MD;  Location: [facility];  Service: Ophthalmology;  Laterality: Left;  19.5  mac    EGD, WITH CLOSED BIOPSY  03/22/2024    Procedure: EGD, WITH CLOSED BIOPSY;  Surgeon: Ronald Calderon MD;  Location: [facility] ENDOSCOPY;  Service: Gastroenterology;;    ESOPHAGOGASTRODUODENOSCOPY N/A 03/22/2024    Procedure: EGD;  Surgeon: Ronald Calderon MD;  Location: [facility] ENDOSCOPY;  Service: Gastroenterology;  Laterality: N/A;    Heart Stent N/A     > 10yrs. AGO    INCISION AND DRAINAGE N/A 03/18/2024    Procedure: Incision and Drainage;  Surgeon: Fahad Rivas MD;  Location: [facility];  Service: Urology;  Laterality: N/A;  I&D SCROTAL ABSCESS    INSERTION OF STENT INTO PERIPHERAL VESSEL Right 10/17/2022    RIGHT SFA ATHERECTOMY, BALLOON ANGIOPLASTY, STENT; RIGHT ANTERIOR TIBIAL ARTERY ATHERECTOMY, BALLOON ANGIOPLASTY    ORCHIECTOMY Left 03/18/2024    Procedure: ORCHIECTOMY;  Surgeon: Fahad Rivas MD;  Location: [facility];  Service: Urology;  Laterality: Left;     Allergies:   Review of patient's allergies indicates:  No Known Allergies  Family History:     Family History   Problem Relation Name Age of Onset    Cancer Mother      Hypertension Mother      Heart failure Father      Stroke Father      Hypertension Father      No Known Problems Sister       Social History:   Social History[1]  Review of Systems:     The patient denies headaches, changes in vision, nausea, emesis, fevers, chills, chest pain, current dyspnea palpitations, or abdominal pain.    Medications:     Home Medications:  Prior to Admission medications    Medication Sig Start Date End Date Taking? Authorizing Provider   acetaminophen 325 mg Cap Take 650 mg by mouth 2 (two) times daily as needed.    Provider,  Historical   albuterol (PROVENTIL/VENTOLIN HFA) 90 mcg/actuation inhaler Inhale 2 puffs into the lungs every 6 (six) hours as needed for Shortness of Breath or Wheezing. Rescue 5/19/25   Marino Marquez DO   albuterol-ipratropium (DUO-NEB) 2.5 mg-0.5 mg/3 mL nebulizer solution Take 3 mLs by nebulization every 6 (six) hours as needed for Shortness of Breath (For use when shortness of breath persists following inhaler usage). Rescue 5/19/25   Marino Marquez DO   atorvastatin (LIPITOR) 80 MG tablet Take 1 tablet (80 mg total) by mouth once daily. 5/19/25 8/17/25  Marino Marquez DO   BREZTRI AEROSPHERE 160-9-4.8 mcg/actuation HFAA Inhale 2 puffs into the lungs 2 (two) times daily. 1/7/25   Provider, Historical   bumetanide (BUMEX) 1 MG tablet Take 1 tablet (1 mg total) by mouth daily as needed (Take every other day for heart failure. Take every day if needed for increasing fluid status.). 5/20/25 5/20/26  Marino Marquez DO   clopidogreL (PLAVIX) 75 mg tablet Take 1 tablet (75 mg total) by mouth once daily. 5/19/25   Marino Marquez DO   diphenhydrAMINE (BENADRYL) 25 mg capsule Take 1 capsule (25 mg total) by mouth every 6 (six) hours as needed for Itching. 5/19/25   Marino Marquez DO   folic acid (FOLVITE) 1 MG tablet Take 1 tablet (1 mg total) by mouth once daily. 4/9/24 4/9/25  Tha Edmond MD   gabapentin (NEURONTIN) 300 MG capsule Take 300 mg by mouth 3 (three) times daily.    Provider, Historical   olopatadine (PATANOL) 0.1 % ophthalmic solution Apply to eye. 2/7/25   Provider, Historical   pantoprazole (PROTONIX) 40 MG tablet Take 1 tablet (40 mg total) by mouth once daily. 5/20/25 5/20/26  Marino Marquez DO   potassium chloride SA (K-DUR,KLOR-CON) 20 MEQ tablet Take 1 tablet (20 mEq total) by mouth 2 (two) times daily. 1/28/25 1/28/26  Vernon Cifuentes MD   predniSONE (DELTASONE) 5 MG tablet Take 4 tablets (20 mg total) by mouth 2 (two) times daily for 7 days, THEN 3 tablets (15 mg total) 2 (two) times daily for 7  days, THEN 2 tablets (10 mg total) 2 (two) times daily for 7 days, THEN 1 tablet (5 mg total) 2 (two) times daily for 7 days. 5/19/25 6/16/25  Marino Marquez DO   QUEtiapine (SEROQUEL) 100 MG Tab Take 1 tablet (100 mg total) by mouth every evening. 5/19/25 6/18/25  Marino Marquez DO   rivaroxaban (XARELTO) 20 mg Tab Take 1 tablet (20 mg total) by mouth Daily. 5/19/25   Marino Marquez DO   senna-docusate (PERICOLACE) 8.6-50 mg per tablet Take 1 tablet by mouth 2 (two) times daily as needed for Constipation (Second line). 5/19/25   Marino Marquez DO   sertraline (ZOLOFT) 100 MG tablet Take 100 mg by mouth once daily.    Provider, Historical   sulfamethoxazole-trimethoprim 800-160mg (BACTRIM DS) 800-160 mg Tab Take 1 tablet by mouth 2 (two) times daily. 5/19/25   Marino Marquez DO   urea (CARMOL) 40 % Crea Apply topically. 2/13/25   Provider, Historical   varenicline tartrate (CHANTIX) 1 mg Tab Take 0.5 mg by mouth. 2/13/25   Provider, Historical   vitamin D (VITAMIN D3) 1000 units Tab Take 1,000 Units by mouth once daily.    Provider, Historical       Current/Inpatient Medications:  Infusions:   DOBUTamine IV infusion (non-titrating)  2.5 mcg/kg/min Intravenous Continuous 3.1 mL/hr at 06/05/25 0530 2.5 mcg/kg/min at 06/05/25 0530    NORepinephrine bitartrate-D5W  0-3 mcg/kg/min Intravenous Continuous   Stopped at 06/05/25 0330     Scheduled:   apixaban  5 mg Oral BID    atorvastatin  80 mg Oral Daily    clopidogreL  75 mg Oral Daily    epoetin deep-ebpx (RETACRIT) injection  10,000 Units Subcutaneous Q7 Days    ferrous sulfate  1 tablet Oral Daily    fluconazole  100 mg Oral Daily    folic acid  1 mg Oral Daily    lactulose  30 g Oral TID    multivitamin  1 tablet Oral Daily    mupirocin   Nasal BID    pantoprazole  40 mg Oral Daily    potassium chloride  10 mEq Intravenous Q1H    QUEtiapine  25 mg Oral QHS    sertraline  50 mg Oral Daily    thiamine  500 mg Oral Daily    vitamin D  1,000 Units Oral Daily  "    PRN:    Current Facility-Administered Medications:     albuterol-ipratropium, 3 mL, Nebulization, Q4H PRN    dextrose 50%, 12.5 g, Intravenous, PRN    dextrose 50%, 25 g, Intravenous, PRN    glucagon (human recombinant), 1 mg, Intramuscular, PRN    glucose, 16 g, Oral, PRN    glucose, 24 g, Oral, PRN    insulin aspart U-100, 0-5 Units, Subcutaneous, QID (AC + HS) PRN    melatonin, 6 mg, Oral, Nightly PRN    naloxone, 0.02 mg, Intravenous, PRN    sodium chloride 0.9%, 10 mL, Intravenous, Q12H PRN    Objective:     24-hour Vitals:   Temp:  [97.6 °F (36.4 °C)-98.5 °F (36.9 °C)] 98.5 °F (36.9 °C)  Pulse:  [] 79  Resp:  [13-29] 23  SpO2:  [72 %-100 %] 100 %  BP: ()/() 83/60    Vitals: BP (!) 83/60   Pulse 79   Temp 98.5 °F (36.9 °C)   Resp (!) 23   Ht 5' 10" (1.778 m)   Wt 79.9 kg (176 lb 2.4 oz)   SpO2 100%   BMI 25.27 kg/m²     Intake/Output Summary (Last 24 hours) at 6/5/2025 0752  Last data filed at 6/5/2025 0530  Gross per 24 hour   Intake 1447.66 ml   Output 4000 ml   Net -2552.34 ml       Physical Exam  Vitals reviewed.   Constitutional:       General: He is not in acute distress.     Appearance: Normal appearance. He is ill-appearing.   HENT:      Head: Normocephalic and atraumatic.      Right Ear: External ear normal.      Left Ear: External ear normal.      Mouth/Throat:      Mouth: Mucous membranes are moist.   Eyes:      Conjunctiva/sclera: Conjunctivae normal.   Neck:      Comments: JVD is present.   Cardiovascular:      Rate and Rhythm: Normal rate. Rhythm irregular.      Pulses: Normal pulses.      Heart sounds: Murmur heard.   Pulmonary:      Effort: Pulmonary effort is normal. No respiratory distress.      Breath sounds: No stridor. Rales present. No wheezing or rhonchi.      Comments: On BiPAP.   Chest:      Chest wall: No tenderness.   Abdominal:      General: Bowel sounds are normal. There is no distension.      Palpations: Abdomen is soft.   Musculoskeletal:      Cervical " back: Neck supple.      Right lower leg: Edema present.      Left lower leg: Edema present.      Comments: Trace LE - improved with diuresis.   Skin:     General: Skin is warm and dry.      Capillary Refill: Capillary refill takes less than 2 seconds.   Neurological:      Mental Status: He is alert and oriented to person, place, and time.      Comments: Oriented to person, place, year, and President.         Labs:   I have reviewed the following labs below:    Recent Labs   Lab 05/31/25  0329 05/31/25  1018 06/01/25  2236 06/01/25  2238 06/02/25  0206 06/02/25  1024 06/02/25  1540 06/02/25  1612 06/03/25  0332 06/04/25  0330 06/04/25  1120 06/04/25  1449 06/05/25  0311   WBC 5.68   < > 6.34  --    < >  --   --   --  7.40 7.29  --   --  6.38   HGB 8.7*   < > 9.9*  --    < >  --   --   --  10.2* 9.6*  --   --  9.9*   HCT 26.6*   < > 30.7*  --    < >  --   --   --  32.4* 31.2*  --   --  31.5*   PLT 70*   < > 57*  --    < >  --   --   --  51* 51*  --   --  44*      < > 141  --    < >  --   --    < > 142 143 143 142 142   K 2.9*   < > 3.0*  --    < >  --   --    < > 3.7 2.6* 3.6 3.4* 2.4*   CL 89*   < > 90*  --    < >  --   --    < > 90* 92* 92* 93* 95*   CO2 32*   < > 35*  --    < >  --   --    < > 35* 36* 37* 33* 33*   BUN 64.1*   < > 73.8*  --    < >  --   --    < > 70.8* 63.1* 60.5* 59.0* 51.2*   CREATININE 1.95*   < > 2.06*  --    < >  --   --    < > 1.94* 2.09* 1.90* 1.84* 1.64*   *   < > 145*  --    < >  --   --    < > 122* 158* 181* 153* 151*   CALCIUM 9.9   < > 9.6  --    < >  --   --    < > 10.0 9.9 10.5* 10.2* 9.9   MG 2.20   < > 2.00  --    < >  --   --   --  2.10 2.10  --   --  2.00   PHOS 2.8   < > 2.8  --    < >  --   --   --  3.3 4.8*  --   --  2.8   PROT 7.5   < > 7.0  --    < >  --   --   --  6.9 6.8  --   --  6.7   ALBUMIN 3.4   < > 3.0*  --    < >  --   --   --  2.9* 2.9*  --   --  2.8*   BILITOT 5.5*   < > 5.0*  --    < >  --   --   --  4.9* 5.6*  --   --  4.7*   *   < > 126*  --     < >  --   --   --  104* 74*  --   --  57*   *   < > 119*  --    < >  --   --   --  107* 84*  --   --  68*   ALKPHOS 422*   < > 416*  --    < >  --   --   --  430* 344*  --   --  379*   INR 1.9*  --   --  1.2  --   --   --   --   --   --   --   --   --    TROPONINI  --    < > 0.115*  --   --  0.153* 0.164*  --   --   --   --   --   --     < > = values in this interval not displayed.     Cardiovascular Studies/Imaging:   I have reviewed the following studies below:    ECG:  AF with occasional VPCs without ischemia or injury pattern.     TTE:     Summary  Show Result Comparison     Left Ventricle: The left ventricle is moderately dilated. LVEDD 6.6 cm. There is severely reduced systolic function with a visually estimated ejection fraction of 20 - 25%. There is diastolic dysfunction.    Right Ventricle: Systolic function is mildly reduced.    Mitral Valve: There is moderate (2+) regurgitation.    Tricuspid Valve: There is mild to moderate (1-2+)regurgitation.    Pulmonary Artery: The estimated pulmonary artery systolic pressure is 53 mmHg.    LHC/Cors:  LHC/Cors: 3/26/2024  Coronary findings:     Dominance: right   Left main:  10-20% calcified distal left main disease  Left anterior descending artery:  50% calcified proximal stenosis  Circumflex artery:  Luminal irregularities  Right coronary artery:  Luminal irregularities    Imaging:   See imaging section for detail. s    Assessment:     Ran Reyes Jr. is a 69 y.o. male with NICM-HFrEF (LVEF 20-25%; G3DD), HTN, NOCAD, AF on OAC, PAD, HLD, COPD, and history of VT arrest (3/18/2024 - post op) who presented with resource instability following recent discharged for acute on chronic decompensated heart failure now with hypoxemic respiratory failure secondary to decompensated heart failure.     Plan/Recommendations:   Acute on Chronic Decompensated Heart Failure with Severe Mitral Regurgitation  -Stage D Functional Class IV without ICD - prescribed WCD.    -Unable to tolerated GDMT. On inotropic support thus holding BB. Limited from SGLT2i use - previous history of Claudine's.  Will watch blood pressure trend today - will consider 1/2 dosed Entresto - though unlikely he will be able to hemodynamically tolerate (sCr is down trending).   -Continue diuresis - remains clinically volume overloaded - JVD; LE improved - trace.  -No acute indication for mitral regurgitation intervention - treat decompensated heart failure - falsely depressed severity due to degree of hypotension during study (68/38 mmHg).   -Patient is amenable to transfer to [facility] - advanced heart failure evaluation by Dr. Dionicio Hurtado [facility] Cardiology.    -Replace electrolytes. Target K > 4 and Mg > 2.     Non Obstructive Epicardial Coronary Artery Disease  -Plavix 75 mg daily. Lipitor 80 mg daily.     Paroxysmal Atrial Fibrillation   -Hold rate control agents - on inotropic support currently.   -Xarelto dosed for CrCl.  Warrants AC from cardiac perspective for CVA risk reduction in the setting of elevated CV2 score.   -Consider switch back to Xarelto - Eliquis is hepatically cleared more so than Xarelto.     Hypertension  -GDMT as tolerated for HFrEF.     Peripheral Artery Disease  -Plavix 75 mg daily. Lipitor 80 mg daily.     Hypercholesterolemia  -Lipitor 80 mg daily.     Consider switch back to Xarelto for CVA risk reduction in the setting of PAF. Eliquis is more hepatically cleared.     Continue diuresis - limited from optimization of GDMT due to various factors as described above. The patient has improving sCr and transaminitis with diuresis - unlikely that aggressive diuresis is the cause of his renal injury that is improving with diuretic therapy - mechanism of injury more likely secondary to renovascular congestion.     Transfer to [facility] in [place] - Dr. Dionicio Hurtado Advanced Heart Failure Specialist - [facility] Cardiology.   Patient with long standing cardiomyopathy,  dating back until at least 2016 - does not have ICD (previous attempted placement was when he presented with Claudine's). Has had recurrent admissions with decompensated heart failure. Recently discharged home and represented due to lack of support at home (family assistance or food). He is a [place] - presumably we can stabilize his social factors as he has pending acceptance potentially at the [facility].     Vernon Cifuentes MD  [facility] Chief Cardiology Fellow, PGY-6  [facility]                [1]   Social History  Tobacco Use    Smoking status: Every Day     Current packs/day: 0.50     Average packs/day: 0.8 packs/day for 50.4 years (41.0 ttl pk-yrs)     Types: Cigarettes     Start date: 1975     Passive exposure: Current    Smokeless tobacco: Never    Tobacco comments:     States smoke 2-3 cigarettes per day   Substance Use Topics    Alcohol use: Yes     Alcohol/week: 3.0 standard drinks of alcohol     Types: 3 Cans of beer per week     Comment: socially    Drug use: Not Currently     Frequency: 1.0 times per week     Types: Marijuana     Comment: no use in over 1 year

## 2025-06-05 NOTE — PROGRESS NOTES
Inpatient Nutrition Assessment    Admit Date: 5/23/2025   Total duration of encounter: 13 days   Patient Age: 69 y.o.    Nutrition Recommendation/Prescription     Continue liberalized diet--No added salt Easy to Chew to encourage po intake  Change Boost Plus to suplena--tid (liver/renal dysfunction) ; Suplena (provides 420 kcal, 11 g protein per serving)   Monitor Weight daily  Continue Thiamine, MVI, Folic acid as ordered -- hx ETOH use  Replace electrolytes as needed  Consider megace to stimulate appetite     Communication of Recommendations: reviewed with nurse and reviewed with patient    Nutrition Assessment     Malnutrition Assessment/Nutrition-Focused Physical Exam    Malnutrition Context: chronic illness (06/05/25 1217)  Malnutrition Level: moderate (06/05/25 1217)  Energy Intake (Malnutrition): less than 75% for greater than or equal to 1 month (06/05/25 1217)  Weight Loss (Malnutrition): greater than 5% in 1 month (06/05/25 1217)     Orbital Region (Subcutaneous Fat Loss): well nourished           Camp Region (Muscle Loss): well nourished         Fluid Accumulation (Malnutrition): moderate (06/05/25 1217)        A minimum of two characteristics is recommended for diagnosis of either severe or non-severe malnutrition.    Chart Review    Reason Seen: continuous nutrition monitoring and follow-up    Malnutrition Screening Tool Results   Have you recently lost weight without trying?: No  Have you been eating poorly because of a decreased appetite?: No   MST Score: 0   Diagnosis: acute respiratory failure; cirrhosis/hepatic encephalopathy; bed bug infestation  UTI  ELIZABETH  Hyponatremia   Anion gap metabolic acidosis   Troponinemia  Chronic persistent A-fib   Nonobstructive CAD (per 3/2024 [facility]), NICM  HFrEF   HTN  pHTN  PVD   HLD  Adrenal insufficiency   COPD  Anxiety/insomnia    Relevant Medical History: Tobacco Dependence, COPD, HTN, pHTN, HLD, CAD, HFrEF, PVD, Claudine's gangrene, ETOH use    Scheduled  Medications:  apixaban, 5 mg, BID  atorvastatin, 80 mg, Daily  clopidogreL, 75 mg, Daily  epoetin deep-ebpx (RETACRIT) injection, 10,000 Units, Q7 Days  ferrous sulfate, 1 tablet, Daily  folic acid, 1 mg, Daily  lactulose, 30 g, TID  multivitamin, 1 tablet, Daily  mupirocin, , BID  pantoprazole, 40 mg, Daily  potassium chloride, 10 mEq, Q1H  QUEtiapine, 25 mg, QHS  sertraline, 50 mg, Daily  thiamine, 500 mg, Daily  vitamin D, 1,000 Units, Daily    Continuous Infusions:  DOBUTamine IV infusion (non-titrating), Last Rate: 2.5 mcg/kg/min (06/05/25 0530)  NORepinephrine bitartrate-D5W, Last Rate: 0.04 mcg/kg/min (06/05/25 1105)    PRN Medications:  albuterol-ipratropium, 3 mL, Q4H PRN  dextrose 50%, 12.5 g, PRN  dextrose 50%, 25 g, PRN  glucagon (human recombinant), 1 mg, PRN  glucose, 16 g, PRN  glucose, 24 g, PRN  insulin aspart U-100, 0-5 Units, QID (AC + HS) PRN  melatonin, 6 mg, Nightly PRN  naloxone, 0.02 mg, PRN  sodium chloride 0.9%, 10 mL, Q12H PRN    Calorie Containing IV Medications: no significant kcals from medications at this time    Recent Labs   Lab 05/31/25  0329 05/31/25  1018 05/31/25  1650 06/01/25  0531 06/01/25  0846 06/01/25  2236 06/02/25  0206 06/02/25  1612 06/03/25  0332 06/04/25  0330 06/04/25  1120 06/04/25  1449 06/05/25  0311      < > 137 137  --  141 139 141 142 143 143 142 142   K 2.9*   < > 3.9 3.6  --  3.0* 2.9* 2.9* 3.7 2.6* 3.6 3.4* 2.4*   CALCIUM 9.9   < > 9.9 10.0  --  9.6 9.9 10.3* 10.0 9.9 10.5* 10.2* 9.9   PHOS 2.8  --   --  2.3  --  2.8 3.3  --  3.3 4.8*  --   --  2.8   MG 2.20  --   --  2.30  --  2.00 2.30  --  2.10 2.10  --   --  2.00   CL 89*   < > 90* 91*  --  90* 87* 88* 90* 92* 92* 93* 95*   CO2 32*   < > 31 31  --  35* 37* 38* 35* 36* 37* 33* 33*   BUN 64.1*   < > 68.2* 73.6*  --  73.8* 77.6* 74.9* 70.8* 63.1* 60.5* 59.0* 51.2*   CREATININE 1.95*   < > 2.01* 2.02*  --  2.06* 2.12* 1.90* 1.94* 2.09* 1.90* 1.84* 1.64*   EGFRNORACEVR 37   < > 36 35  --  35 33 38 37 34  38 40 46   *   < > 147* 159*  --  145* 187* 147* 122* 158* 181* 153* 151*   BILITOT 5.5*   < > 5.3* 5.1*  --  5.0* 5.0*  --  4.9* 5.6*  --   --  4.7*   ALKPHOS 422*   < > 426* 423*  --  416* 399*  --  430* 344*  --   --  379*   *   < > 119* 126*  --  119* 116*  --  107* 84*  --   --  68*   *   < > 140* 145*  --  126* 122*  --  104* 74*  --   --  57*   ALBUMIN 3.4   < > 3.3* 3.2*  --  3.0* 3.0*  --  2.9* 2.9*  --   --  2.8*   AMMONIA  --   --   --   --  57.7 164.7*  --   --   --   --   --   --   --    WBC 5.68  --   --  6.01  --  6.34 6.66  --  7.40 7.29  --   --  6.38   HGB 8.7*  --   --  9.5*  --  9.9* 9.7*  --  10.2* 9.6*  --   --  9.9*   HCT 26.6*  --   --  29.3*  --  30.7* 30.3*  --  32.4* 31.2*  --   --  31.5*    < > = values in this interval not displayed.     Nutrition Orders:  Diet Easy to Chew (IDDSI Level 7) No Added Salt / 4 gm Sodium  Dietary nutrition supplements TID; Boost Plus Nutritional Drink - Rich Chocolate    Appetite/Oral Intake: poor/25-50% of meals  Factors Affecting Nutritional Intake: decreased appetite and shortness of breath  Social Needs Impacting Access to Food: none identified  Food/Temple/Cultural Preferences: none reported  Food Allergies: no known food allergies  Last Bowel Movement: 06/04/25  Wound(s):  skin intact    Comments  (6/5) Pt upgraded to ICU status; currently on vapotherm; on levophed/pressure support; RN reported pt takes few bites food; mainly drinking liquids; does drink ONS--will change to lower protein/high calorie supplement--noted decline renal/liver fx; NH4 (H)--on lactulose. LFTs elevated. Pt remains on MVI/folic acid/thiamine supplementation /hx ETOH use. K(L)--on KCL. Consider megace for appetite. Noted wt continues to decrease; bed wt 176#; noted -220#; partly fluid.     5/30/25 -- Pt continues with decreased oral intake of meals, drinking boost -- will change to Boost Plus to better meet nutrition; Denies n/v or abdominal pain,  "complains of food with no seasoning, pt without food preferences at this time stating he likes all foods; pt also with confusion over the last couple days, improvement this am per nursing; h/o ETOH use reported, pt started on Folic acid, thiamine, MVI; diet change to Soft & Bite size per MD for ease of chewing; pt with weight loss since admit, most likely fluid related, diuresing -- will continue to monitor; K (L) -- replacing, continue MVI    5/26/25 -- Pt reports fair appetite over the last week also dislike of heart healthy diet, pt without food pretences this visit, agreeable to Boost in chocolate flavor; denies n/v/d; wieght fluctuation noted per EMR weight history review most likely related to fluid with h/o HF, continue heart healthy diet as ordered; Glu (H) - no h/o DM, most likely steroid induced, will continue to monitor for need to adjust diet    Anthropometrics    Height: 5' 10" (177.8 cm),    Last Weight: 79.9 kg (176 lb 2.4 oz) (06/05/25 0600), Weight Method: Bed Scale  BMI (Calculated): 25.3  BMI Classification: overweight (BMI 25-29.9)        Ideal Body Weight (IBW), Male: 166 lb     % Ideal Body Weight, Male (lb): 108.43 %                 Usual Body Weight (UBW), kg:  (200-220 lb)        Usual Weight Provided By: patient and EMR weight history    Wt Readings from Last 5 Encounters:   06/05/25 79.9 kg (176 lb 2.4 oz)   05/19/25 94.3 kg (208 lb)   03/24/25 102.1 kg (225 lb)   03/07/25 101.2 kg (223 lb)   03/04/25 102.5 kg (225 lb 14.4 oz)     Weight Change(s) Since Admission: new admit; 1-2+ edema present  Wt Readings from Last 1 Encounters:   06/05/25 0600 79.9 kg (176 lb 2.4 oz)   06/04/25 0551 81.9 kg (180 lb 8.9 oz)   06/03/25 0600 83.1 kg (183 lb 3.2 oz)   06/02/25 1000 81.6 kg (180 lb)   06/02/25 0550 82 kg (180 lb 12.4 oz)   06/01/25 0541 84.7 kg (186 lb 11.7 oz)   05/31/25 0635 84.8 kg (186 lb 15.2 oz)   05/30/25 0625 82.6 kg (182 lb 1.6 oz)   05/29/25 0700 82.4 kg (181 lb 10.5 oz)   05/29/25 " 0115 82.4 kg (181 lb 10.5 oz)   05/28/25 0700 82.6 kg (182 lb 1.6 oz)   05/28/25 0607 82.6 kg (182 lb 1.6 oz)   05/27/25 0645 80.7 kg (178 lb)   05/26/25 0615 95.1 kg (209 lb 11.2 oz)   05/25/25 0700 91.4 kg (201 lb 8 oz)   05/25/25 0500 91.4 kg (201 lb 8 oz)   05/24/25 0700 94.1 kg (207 lb 7.3 oz)   05/24/25 0545 94.1 kg (207 lb 8 oz)   05/23/25 1834 94 kg (207 lb 3.7 oz)   Admit Weight: 94 kg (207 lb 3.7 oz) (05/23/25 1834), Weight Method: Estimated    Estimated Needs    Weight Used For Calorie Calculations: 79.9 kg (176 lb 2.4 oz)  Energy Calorie Requirements (kcal): 1997 kcal/d; 25 kcal/kg  Energy Need Method: Kcal/kg  Weight Used For Protein Calculations: 79.9 kg (176 lb 2.4 oz)  Protein Requirements: 80 gm protein/d; 1 gm/kg --monitor protein load renal/liver dysfunction  Fluid Requirements (mL): 1997 ml/d; 1ml/tyron        Enteral Nutrition     Patient not receiving enteral nutrition at this time.    Parenteral Nutrition     Patient not receiving parenteral nutrition support at this time.    Evaluation of Received Nutrient Intake    Calories: not meeting estimated needs  Protein: not meeting estimated needs    Patient Education     Not applicable.    Nutrition Diagnosis     PES: Inadequate oral intake related to acute illness as evidenced by < 75% nutrition intake x 1 week. (active)     PES: Moderate chronic disease or condition related malnutrition Related to CHF/cirrhosis  As Evidenced by:  - weight loss: > 5% in 1 month - energy intake: < 75% for 4 weeks (meets criteria for < 75% for >= 1 month (moderate - chronic)) - fluid accumulation: 2 areas of moderate or severe fluid accumulation (Lower extremities edema, Upper extremities edema) active    Nutrition Interventions     Intervention(s): modified composition of meals/snacks, commercial beverage, and collaboration with other providers  Intervention(s): Care coordination or referral;Oral diet/nutrient modifications;Oral nutritional supplement    Goal: Meet  greater than 80% of nutritional needs by follow-up. (goal not met)  Goal: Maintain weight throughout hospitalization. (goal progressing)    Nutrition Goals & Monitoring     Dietitian will monitor: food and beverage intake, weight, and electrolyte/renal panel  Discharge planning: continue easy to chew/Low Na diet with suplena tid diet;   Nutrition Risk/Follow-Up: patient at increased nutrition risk; dietitian will follow-up twice weekly   Please consult if re-assessment needed sooner.

## 2025-06-06 NOTE — PT/OT/SLP DISCHARGE
Occupational Therapy Discharge Summary    Ran Reyes Jr.  MRN: [id number]   Principal Problem: ARDS (adult respiratory distress syndrome)      Patient Discharged from acute Occupational Therapy on 6/5/25.  Please refer to prior OT note dated 6/5/25 for functional status.    Assessment:      Patient was discharged unexpectedly.  Information required to complete an accurate discharge summary is unknown.  Refer to therapy initial evaluation and last progress note for initial and most recent functional status and goal achievement.  Recommendations made may be found in medical record.    Objective:     GOALS:   Multidisciplinary Problems       Occupational Therapy Goals          Problem: Occupational Therapy    Goal Priority Disciplines Outcome Interventions   Occupational Therapy Goal     OT, PT/OT Progressing    Description: Goals to be met by: DC     Patient will increase functional independence with ADLs by performing:    Reeval 6/2/25 due to upgrade to ICU and goals revised     Feeding in supported sitting  modified independent after assisted with set up .  UE Dressing with minimal assistance seated EOB .   LE Dressing with moderate assistance .  Grooming while seated at sink with stand by assistance  .  Toileting from bedside commode with Moderate assistance  for hygiene and clothing management.   Toilet step transfer to bedside commode with min  assistance  using RW.  Increase B UE strength to 3+/5 throughout as needed to impact self care performance                          Reasons for Discontinuation of Therapy Services  Dc from hospital      Plan:     Patient Discharged to: another hospital setting ICU    6/6/2025

## 2025-06-06 NOTE — PT/OT/SLP DISCHARGE
POST DISCHARGE DOCUMENTATION - 06/06/2025 7:39 AM    Physical Therapy Discharge Summary    Name: Ran Reyes Jr.  MRN: [id number]   Principal Problem: ARDS (adult respiratory distress syndrome)   1. Chronic congestive heart failure, unspecified heart failure type    2. Liver cirrhosis    3. Congestive heart failure, unspecified HF chronicity, unspecified heart failure type    4. Weakness    5. Longstanding persistent atrial fibrillation    6. ELIZABETH (acute kidney injury)    7. Acute cystitis without hematuria    8. Unable to care for self    9. Chest pain    10. Elevated troponin    11. Arteriosclerosis of coronary artery    12. Hepatic cirrhosis, unspecified hepatic cirrhosis type, unspecified whether ascites present    13. Anemia in chronic kidney disease, unspecified CKD stage    14. Hypokalemia    15. Hyponatremia    16. Tobacco dependency    17. SOB (shortness of breath)    18. Routine check-up       Problem List[1]   Recommendations - per last treatment session     Therapy Intensity Recommendations at Discharge: Low Intensity Therapy (Hospice?)  Discharge Equipment Recommendations: to be determined by next level of care     Assessment:     Patient last seen by PT SERVICES on 6/3/25    Patient was transferred to alternate level of care.    Objective     GOALS: - 0 of 4 STG's met by patient  Multidisciplinary Problems       Physical Therapy Goals          Problem: Physical Therapy    Goal Priority Disciplines Outcome Interventions   Physical Therapy Goal     PT, PT/OT Adequate for Care Transition    Description: Goals to be met by: Discharge      Patient will increase functional independence with mobility by performing:    New goals established due to functional decline and upgrade to ICU    1. Improve B LE MMT to 3+/5  2. Rolling side to side to be completed at MIN A  3. Supine<>Sit to be completed at MIN A  4. Sit to stands to be assessed and completed once able.                        Plan     Patient  Discharged to: another health care institution - not defined per chart.    6/6/2025       [1]   Patient Active Problem List  Diagnosis    Primary open angle glaucoma (POAG) of right eye, moderate stage    Combined forms of age-related cataract of left eye    Arteriosclerosis of coronary artery    Chronic atrial fibrillation    Dyslipidemia    Hypertension    Tobacco use    PVD (peripheral vascular disease)    VHD (valvular heart disease)    COVID-19    HFrEF (heart failure with reduced ejection fraction)    Nonrheumatic mitral valve regurgitation    Postoperative eye state    Scrotal hematoma    Chronic obstructive pulmonary disease, unspecified    Lesion of external ear    Low back pain    Other thrombophilia    Secondary hyperaldosteronism    Positive colorectal cancer screening using Cologuard test    Acute heart failure    History of COPD    CHF (congestive heart failure)    Acute cystitis with hematuria    Tobacco dependency    Moderate malnutrition    Liver cirrhosis    Hypokalemia    Hyponatremia    Unable to care for self    ARDS (adult respiratory distress syndrome)

## 2025-06-09 DIAGNOSIS — N13.8 BPH WITH OBSTRUCTION/LOWER URINARY TRACT SYMPTOMS: Primary | ICD-10-CM

## 2025-06-09 DIAGNOSIS — N40.1 BPH WITH OBSTRUCTION/LOWER URINARY TRACT SYMPTOMS: Primary | ICD-10-CM

## 2025-06-12 ENCOUNTER — TELEPHONE (OUTPATIENT)
Dept: SMOKING CESSATION | Facility: CLINIC | Age: 70
End: 2025-06-12
Payer: MEDICARE

## 2025-07-08 ENCOUNTER — HOSPITAL ENCOUNTER (INPATIENT)
Facility: HOSPITAL | Age: 70
LOS: 5 days | Discharge: NURSING, REHAB OR LONG-TERM CARE | DRG: 291 | End: 2025-07-14
Attending: EMERGENCY MEDICINE | Admitting: INTERNAL MEDICINE
Payer: MEDICARE

## 2025-07-08 DIAGNOSIS — R79.89 TROPONIN LEVEL ELEVATED: ICD-10-CM

## 2025-07-08 DIAGNOSIS — J81.0 ACUTE PULMONARY EDEMA: ICD-10-CM

## 2025-07-08 DIAGNOSIS — I50.43 ACUTE ON CHRONIC COMBINED SYSTOLIC AND DIASTOLIC CONGESTIVE HEART FAILURE: Primary | ICD-10-CM

## 2025-07-08 DIAGNOSIS — I50.9 ACUTE CONGESTIVE HEART FAILURE, UNSPECIFIED HEART FAILURE TYPE: ICD-10-CM

## 2025-07-08 DIAGNOSIS — R07.9 CHEST PAIN: ICD-10-CM

## 2025-07-08 DIAGNOSIS — I48.11 LONGSTANDING PERSISTENT ATRIAL FIBRILLATION: ICD-10-CM

## 2025-07-08 DIAGNOSIS — R06.02 SHORTNESS OF BREATH: ICD-10-CM

## 2025-07-08 DIAGNOSIS — R06.82 TACHYPNEA: ICD-10-CM

## 2025-07-08 LAB
ABSOLUTE EOSINOPHIL (OHS): 0.04 K/UL
ABSOLUTE MONOCYTE (OHS): 0.5 K/UL (ref 0.3–1)
ABSOLUTE NEUTROPHIL COUNT (OHS): 4.69 K/UL (ref 1.8–7.7)
ACANTHOCYTES BLD QL SMEAR: PRESENT
ALBUMIN SERPL BCP-MCNC: 3.5 G/DL (ref 3.5–5.2)
ALP SERPL-CCNC: 208 UNIT/L (ref 40–150)
ALT SERPL W/O P-5'-P-CCNC: 19 UNIT/L (ref 10–44)
ANION GAP (OHS): 13 MMOL/L (ref 8–16)
ANISOCYTOSIS BLD QL SMEAR: SLIGHT
AST SERPL-CCNC: 23 UNIT/L (ref 11–45)
BASOPHILS # BLD AUTO: 0.02 K/UL
BASOPHILS NFR BLD AUTO: 0.3 %
BILIRUB SERPL-MCNC: 3.2 MG/DL (ref 0.1–1)
BNP SERPL-MCNC: 1837 PG/ML (ref 0–99)
BUN SERPL-MCNC: 13 MG/DL (ref 8–23)
CALCIUM SERPL-MCNC: 9.4 MG/DL (ref 8.7–10.5)
CHLORIDE SERPL-SCNC: 101 MMOL/L (ref 95–110)
CO2 SERPL-SCNC: 23 MMOL/L (ref 23–29)
CREAT SERPL-MCNC: 1 MG/DL (ref 0.5–1.4)
ERYTHROCYTE [DISTWIDTH] IN BLOOD BY AUTOMATED COUNT: 28.1 % (ref 11.5–14.5)
GFR SERPLBLD CREATININE-BSD FMLA CKD-EPI: >60 ML/MIN/1.73/M2
GLUCOSE SERPL-MCNC: 106 MG/DL (ref 70–110)
HCT VFR BLD AUTO: 29.6 % (ref 40–54)
HGB BLD-MCNC: 8.8 GM/DL (ref 14–18)
IMM GRANULOCYTES # BLD AUTO: 0.02 K/UL (ref 0–0.04)
IMM GRANULOCYTES NFR BLD AUTO: 0.3 % (ref 0–0.5)
INFLUENZA A MOLECULAR (OHS): NEGATIVE
INFLUENZA B MOLECULAR (OHS): NEGATIVE
LYMPHOCYTES # BLD AUTO: 0.8 K/UL (ref 1–4.8)
MAGNESIUM SERPL-MCNC: 1.7 MG/DL (ref 1.6–2.6)
MCH RBC QN AUTO: 26.7 PG (ref 27–31)
MCHC RBC AUTO-ENTMCNC: 29.7 G/DL (ref 32–36)
MCV RBC AUTO: 90 FL (ref 82–98)
NUCLEATED RBC (/100WBC) (OHS): 0 /100 WBC
OVALOCYTES BLD QL SMEAR: NORMAL
PLATELET # BLD AUTO: 195 K/UL (ref 150–450)
PLATELET BLD QL SMEAR: NORMAL
PMV BLD AUTO: 10 FL (ref 9.2–12.9)
POCT GLUCOSE: 94 MG/DL (ref 70–110)
POLYCHROMASIA BLD QL SMEAR: NORMAL
POTASSIUM SERPL-SCNC: 3.9 MMOL/L (ref 3.5–5.1)
PROT SERPL-MCNC: 7.2 GM/DL (ref 6–8.4)
RBC # BLD AUTO: 3.29 M/UL (ref 4.6–6.2)
RELATIVE EOSINOPHIL (OHS): 0.7 %
RELATIVE LYMPHOCYTE (OHS): 13.2 % (ref 18–48)
RELATIVE MONOCYTE (OHS): 8.2 % (ref 4–15)
RELATIVE NEUTROPHIL (OHS): 77.3 % (ref 38–73)
SARS-COV-2 RDRP RESP QL NAA+PROBE: NEGATIVE
SCHISTOCYTES BLD QL SMEAR: NORMAL
SODIUM SERPL-SCNC: 137 MMOL/L (ref 136–145)
TARGETS BLD QL SMEAR: NORMAL
TROPONIN I SERPL DL<=0.01 NG/ML-MCNC: 0.03 NG/ML
WBC # BLD AUTO: 6.07 K/UL (ref 3.9–12.7)

## 2025-07-08 PROCEDURE — 83735 ASSAY OF MAGNESIUM: CPT | Performed by: INTERNAL MEDICINE

## 2025-07-08 PROCEDURE — G0378 HOSPITAL OBSERVATION PER HR: HCPCS

## 2025-07-08 PROCEDURE — 83036 HEMOGLOBIN GLYCOSYLATED A1C: CPT | Performed by: INTERNAL MEDICINE

## 2025-07-08 PROCEDURE — 25000003 PHARM REV CODE 250: Performed by: INTERNAL MEDICINE

## 2025-07-08 PROCEDURE — 63600175 PHARM REV CODE 636 W HCPCS: Performed by: EMERGENCY MEDICINE

## 2025-07-08 PROCEDURE — 93005 ELECTROCARDIOGRAM TRACING: CPT

## 2025-07-08 PROCEDURE — 84484 ASSAY OF TROPONIN QUANT: CPT | Performed by: EMERGENCY MEDICINE

## 2025-07-08 PROCEDURE — 93010 ELECTROCARDIOGRAM REPORT: CPT | Mod: ,,, | Performed by: INTERNAL MEDICINE

## 2025-07-08 PROCEDURE — U0002 COVID-19 LAB TEST NON-CDC: HCPCS | Performed by: EMERGENCY MEDICINE

## 2025-07-08 PROCEDURE — 80053 COMPREHEN METABOLIC PANEL: CPT | Performed by: EMERGENCY MEDICINE

## 2025-07-08 PROCEDURE — 63600175 PHARM REV CODE 636 W HCPCS: Performed by: INTERNAL MEDICINE

## 2025-07-08 PROCEDURE — 85025 COMPLETE CBC W/AUTO DIFF WBC: CPT | Performed by: EMERGENCY MEDICINE

## 2025-07-08 PROCEDURE — 96374 THER/PROPH/DIAG INJ IV PUSH: CPT

## 2025-07-08 PROCEDURE — 99285 EMERGENCY DEPT VISIT HI MDM: CPT | Mod: 25

## 2025-07-08 PROCEDURE — 87502 INFLUENZA DNA AMP PROBE: CPT | Performed by: EMERGENCY MEDICINE

## 2025-07-08 PROCEDURE — 83880 ASSAY OF NATRIURETIC PEPTIDE: CPT | Performed by: EMERGENCY MEDICINE

## 2025-07-08 RX ORDER — FUROSEMIDE 10 MG/ML
80 INJECTION INTRAMUSCULAR; INTRAVENOUS
Status: COMPLETED | OUTPATIENT
Start: 2025-07-08 | End: 2025-07-08

## 2025-07-08 RX ORDER — SODIUM CHLORIDE 0.9 % (FLUSH) 0.9 %
10 SYRINGE (ML) INJECTION
Status: DISCONTINUED | OUTPATIENT
Start: 2025-07-08 | End: 2025-07-14 | Stop reason: HOSPADM

## 2025-07-08 RX ORDER — SERTRALINE HYDROCHLORIDE 50 MG/1
50 TABLET, FILM COATED ORAL DAILY
Status: DISCONTINUED | OUTPATIENT
Start: 2025-07-09 | End: 2025-07-14 | Stop reason: HOSPADM

## 2025-07-08 RX ORDER — ATORVASTATIN CALCIUM 40 MG/1
40 TABLET, FILM COATED ORAL NIGHTLY
Status: DISCONTINUED | OUTPATIENT
Start: 2025-07-08 | End: 2025-07-14 | Stop reason: HOSPADM

## 2025-07-08 RX ORDER — ONDANSETRON HYDROCHLORIDE 2 MG/ML
4 INJECTION, SOLUTION INTRAVENOUS EVERY 6 HOURS PRN
Status: DISCONTINUED | OUTPATIENT
Start: 2025-07-08 | End: 2025-07-14 | Stop reason: HOSPADM

## 2025-07-08 RX ORDER — IPRATROPIUM BROMIDE AND ALBUTEROL SULFATE 2.5; .5 MG/3ML; MG/3ML
3 SOLUTION RESPIRATORY (INHALATION) EVERY 4 HOURS PRN
Status: DISCONTINUED | OUTPATIENT
Start: 2025-07-08 | End: 2025-07-14 | Stop reason: HOSPADM

## 2025-07-08 RX ORDER — AMOXICILLIN 250 MG
1 CAPSULE ORAL DAILY PRN
Status: DISCONTINUED | OUTPATIENT
Start: 2025-07-08 | End: 2025-07-14 | Stop reason: HOSPADM

## 2025-07-08 RX ORDER — HYDROCORTISONE 10 MG/1
20 TABLET ORAL DAILY
Status: DISCONTINUED | OUTPATIENT
Start: 2025-07-09 | End: 2025-07-14 | Stop reason: HOSPADM

## 2025-07-08 RX ORDER — INSULIN ASPART 100 [IU]/ML
0-10 INJECTION, SOLUTION INTRAVENOUS; SUBCUTANEOUS
Status: DISCONTINUED | OUTPATIENT
Start: 2025-07-08 | End: 2025-07-14 | Stop reason: HOSPADM

## 2025-07-08 RX ORDER — IBUPROFEN 200 MG
16 TABLET ORAL
Status: DISCONTINUED | OUTPATIENT
Start: 2025-07-08 | End: 2025-07-14 | Stop reason: HOSPADM

## 2025-07-08 RX ORDER — THIAMINE HCL 100 MG
100 TABLET ORAL DAILY
Status: DISCONTINUED | OUTPATIENT
Start: 2025-07-09 | End: 2025-07-14 | Stop reason: HOSPADM

## 2025-07-08 RX ORDER — GLUCAGON 1 MG
1 KIT INJECTION
Status: DISCONTINUED | OUTPATIENT
Start: 2025-07-08 | End: 2025-07-14 | Stop reason: HOSPADM

## 2025-07-08 RX ORDER — IBUPROFEN 200 MG
24 TABLET ORAL
Status: DISCONTINUED | OUTPATIENT
Start: 2025-07-08 | End: 2025-07-14 | Stop reason: HOSPADM

## 2025-07-08 RX ORDER — FUROSEMIDE 10 MG/ML
80 INJECTION INTRAMUSCULAR; INTRAVENOUS EVERY 12 HOURS
Status: DISCONTINUED | OUTPATIENT
Start: 2025-07-08 | End: 2025-07-12

## 2025-07-08 RX ORDER — AMIODARONE HYDROCHLORIDE 200 MG/1
400 TABLET ORAL DAILY
Status: DISCONTINUED | OUTPATIENT
Start: 2025-07-09 | End: 2025-07-14 | Stop reason: HOSPADM

## 2025-07-08 RX ORDER — HYDROCORTISONE 10 MG/1
10 TABLET ORAL NIGHTLY
Status: DISCONTINUED | OUTPATIENT
Start: 2025-07-08 | End: 2025-07-14 | Stop reason: HOSPADM

## 2025-07-08 RX ORDER — TALC
6 POWDER (GRAM) TOPICAL NIGHTLY PRN
Status: DISCONTINUED | OUTPATIENT
Start: 2025-07-08 | End: 2025-07-14 | Stop reason: HOSPADM

## 2025-07-08 RX ORDER — PANTOPRAZOLE SODIUM 40 MG/1
40 TABLET, DELAYED RELEASE ORAL DAILY
Status: DISCONTINUED | OUTPATIENT
Start: 2025-07-09 | End: 2025-07-14 | Stop reason: HOSPADM

## 2025-07-08 RX ADMIN — FUROSEMIDE 80 MG: 10 INJECTION, SOLUTION INTRAVENOUS at 10:07

## 2025-07-08 RX ADMIN — ATORVASTATIN CALCIUM 40 MG: 40 TABLET, FILM COATED ORAL at 08:07

## 2025-07-08 RX ADMIN — FUROSEMIDE 80 MG: 10 INJECTION, SOLUTION INTRAMUSCULAR; INTRAVENOUS at 04:07

## 2025-07-08 RX ADMIN — HYDROCORTISONE 10 MG: 10 TABLET ORAL at 08:07

## 2025-07-08 NOTE — ED PROVIDER NOTES
"SCRIBE #1 NOTE: I, Angelina Ochoa, am scribing for, and in the presence of, Ulices Jack Jr., MD. I have scribed the entire note.       History     Chief Complaint   Patient presents with    Shortness of Breath     EMS reports pt. Was sent over from [facility] for increased SOB and extremity edema for the last week. Pt. Is on home o2 at 3 liters via N/C.      Review of patient's allergies indicates:  No Known Allergies      History of Present Illness     HPI    7/8/2025, 4:17 PM  History obtained from the patient      History of Present Illness: Ran Reyes Jr. is a 69 y.o. male patient with a PMHx of CHF, HTN, COPD, A-fib, CAD, HLD, PAD, anxiety, depression, and POAG who presents to the Emergency Department for evaluation of SOB which onset gradually over the past week. Pt is from [facility]. Symptoms are constant and moderate in severity. No mitigating or exacerbating factors reported. Pt states that he feels like he is "smothering" while laying down. Associated sxs include bilateral leg swelling, CP, chest tightness, and trouble sleeping. Patient denies any fever, N/V/D, cough, chest pressure, and all other sxs at this time. Pt is on home O2 consistently. No prior Tx reported. No further complaints or concerns at this time.       Arrival mode:  Ambulance Service    PCP: Abiodun Recinos DO        Past Medical History:  Past Medical History:   Diagnosis Date    A-fib     Anticoagulant long-term use     Anxiety     Aortic aneurysm     Cataract     CHF (congestive heart failure)     Chronic atrial fibrillation     COPD (chronic obstructive pulmonary disease)     Coronary artery disease     Depression     HLD (hyperlipidemia)     Hypertension     Mitral regurgitation     PAD (peripheral artery disease)     Primary open angle glaucoma (POAG)        Past Surgical History:  Past Surgical History:   Procedure Laterality Date    ANGIOGRAM, CORONARY, WITH LEFT HEART CATHETERIZATION N/A 03/26/2024 "    Procedure: Angiogram, Coronary, with Left Heart Cath;  Surgeon: Adriano Montiel MD;  Location: [facility] CATH LAB;  Service: Cardiology;  Laterality: N/A;    ATHERECTOMY OF PERIPHERAL VESSEL Left 09/12/2022    LEFT SFA ATHERECTOMY, BALLOON ANGIOPLASTY    CATARACT EXTRACTION W/  INTRAOCULAR LENS IMPLANT Left 03/09/2023    Procedure: EXTRACTION, CATARACT, WITH IOL INSERTION;  Surgeon: Georgi Borja MD;  Location: [facility] OR;  Service: Ophthalmology;  Laterality: Left;  19.5  mac    EGD, WITH CLOSED BIOPSY  03/22/2024    Procedure: EGD, WITH CLOSED BIOPSY;  Surgeon: Ronald Calderon MD;  Location: [facility] ENDOSCOPY;  Service: Gastroenterology;;    ESOPHAGOGASTRODUODENOSCOPY N/A 03/22/2024    Procedure: EGD;  Surgeon: Ronald Calderon MD;  Location: [facility] ENDOSCOPY;  Service: Gastroenterology;  Laterality: N/A;    Heart Stent N/A     > 10yrs. AGO    INCISION AND DRAINAGE N/A 03/18/2024    Procedure: Incision and Drainage;  Surgeon: Fahad Rivas MD;  Location: [facility];  Service: Urology;  Laterality: N/A;  I&D SCROTAL ABSCESS    INSERTION OF STENT INTO PERIPHERAL VESSEL Right 10/17/2022    RIGHT SFA ATHERECTOMY, BALLOON ANGIOPLASTY, STENT; RIGHT ANTERIOR TIBIAL ARTERY ATHERECTOMY, BALLOON ANGIOPLASTY    ORCHIECTOMY Left 03/18/2024    Procedure: ORCHIECTOMY;  Surgeon: Fahad Rivas MD;  Location: [facility];  Service: Urology;  Laterality: Left;         Family History:  Family History   Problem Relation Name Age of Onset    Cancer Mother      Hypertension Mother      Heart failure Father      Stroke Father      Hypertension Father      No Known Problems Sister         Social History:  Social History     Tobacco Use    Smoking status: Every Day     Current packs/day: 0.50     Average packs/day: 0.8 packs/day for 50.5 years (41.0 ttl pk-yrs)     Types: Cigarettes     Start date: 1975     Passive exposure: Current    Smokeless tobacco: Never    Tobacco comments:     States smoke 2-3 cigarettes  per day   Substance and Sexual Activity    Alcohol use: Yes     Alcohol/week: 3.0 standard drinks of alcohol     Types: 3 Cans of beer per week     Comment: socially    Drug use: Not Currently     Frequency: 1.0 times per week     Types: Marijuana     Comment: no use in over 1 year    Sexual activity: Not Currently     Partners: Female        Review of Systems     Review of Systems   Constitutional:  Negative for fever.   Respiratory:  Positive for chest tightness and shortness of breath. Negative for cough.         (-) chest pressure   Cardiovascular:  Positive for chest pain and leg swelling.   Gastrointestinal:  Negative for diarrhea, nausea and vomiting.   Psychiatric/Behavioral:  Positive for sleep disturbance (trouble sleeping).    All other systems reviewed and are negative.       Physical Exam     Initial Vitals [07/08/25 1545]   BP Pulse Resp Temp SpO2   116/72 77 20 98.7 °F (37.1 °C) 97 %      MAP       --          Physical Exam  Nursing Notes and Vital Signs Reviewed.  Constitutional: Patient is in no acute distress. Well-developed and well-nourished.  Head: Atraumatic. Normocephalic.  Eyes:  EOM intact.  No scleral icterus.  ENT: Mucous membranes are moist.  Nares clear   Neck:  Full ROM. No JVD.  Cardiovascular: Regular rate. Regular rhythm No murmurs, rubs, or gallops. Distal pulses are 2+ and symmetric  Pulmonary/Chest:  The patient is tachypneic.  That has has a baseline use of oxygen at 1 L currently on 2..  Crackles in the bilateral bases  Equal chest wall rise bilaterally  Abdominal: Soft and non-distended.  There is no tenderness.  No rebound, guarding, or rigidity. Good bowel sounds.  Genitourinary: No CVA tenderness.  No suprapubic tenderness  Musculoskeletal: Moves all extremities. No obvious deformities.  5 x 5 strength in all extremities 4+ symmetrical pedal edema from his toes into his groins and genitalia.  Skin: Warm and dry.  Neurological:  Alert, awake, and appropriate.  Normal speech.   No acute focal neurological deficits are appreciated.  Two through 12 intact bilaterally.  Psychiatric: Normal affect. Good eye contact. Appropriate in content.     ED Course   Critical Care    Date/Time: 7/8/2025 5:57 PM    Performed by: Ulices Jack Jr., MD  Authorized by: Ulices Jack Jr., MD  Direct patient critical care time: 20 minutes  Additional history critical care time: 5 minutes  Ordering / reviewing critical care time: 10 minutes  Documentation critical care time: 5 minutes  Consulting other physicians critical care time: 5 minutes  Total critical care time (exclusive of procedural time) : 45 minutes  Critical care time was exclusive of teaching time and separately billable procedures and treating other patients.  Critical care was necessary to treat or prevent imminent or life-threatening deterioration of the following conditions: respiratory failure (pulmonary edema).  Critical care was time spent personally by me on the following activities: blood draw for specimens, development of treatment plan with patient or surrogate, discussions with consultants, interpretation of cardiac output measurements, examination of patient, evaluation of patient's response to treatment, ordering and performing treatments and interventions, obtaining history from patient or surrogate, ordering and review of laboratory studies, ordering and review of radiographic studies, pulse oximetry, re-evaluation of patient's condition and review of old charts.        ED Vital Signs:  Vitals:    07/08/25 1545 07/08/25 1635 07/08/25 1654 07/08/25 1735   BP: 116/72 126/69  (!) 144/88   Pulse: 77 88  67   Resp: 20      Temp: 98.7 °F (37.1 °C)      TempSrc: Oral      SpO2: 97% 99%  99%   Weight:   102.3 kg (225 lb 8.5 oz)        Abnormal Lab Results:  Labs Reviewed   COMPREHENSIVE METABOLIC PANEL - Abnormal       Result Value    Sodium 137      Potassium 3.9      Chloride 101      CO2 23      Glucose 106      BUN 13       Creatinine 1.0      Calcium 9.4      Protein Total 7.2      Albumin 3.5      Bilirubin Total 3.2 (*)      (*)     AST 23      ALT 19      Anion Gap 13      eGFR >60     TROPONIN I - Abnormal    Troponin-I 0.031 (*)    B-TYPE NATRIURETIC PEPTIDE - Abnormal    BNP 1,837 (*)    CBC WITH DIFFERENTIAL - Abnormal    WBC 6.07      RBC 3.29 (*)     HGB 8.8 (*)     HCT 29.6 (*)     MCV 90      MCH 26.7 (*)     MCHC 29.7 (*)     RDW 28.1 (*)     Platelet Count 195      MPV 10.0      Nucleated RBC 0      Neut % 77.3 (*)     Lymph % 13.2 (*)     Mono % 8.2      Eos % 0.7      Basophil % 0.3      Imm Grans % 0.3      Neut # 4.69      Lymph # 0.80 (*)     Mono # 0.50      Eos # 0.04      Baso # 0.02      Imm Grans # 0.02     INFLUENZA A & B BY MOLECULAR - Normal    INFLUENZA A MOLECULAR Negative      INFLUENZA B MOLECULAR  Negative     SARS-COV-2 RNA AMPLIFICATION, QUAL - Normal    SARS COV-2 Molecular Negative     CBC W/ AUTO DIFFERENTIAL    Narrative:     The following orders were created for panel order CBC auto differential.  Procedure                               Abnormality         Status                     ---------                               -----------         ------                     CBC with Differential[[id number]]       Abnormal            Final result                 Please view results for these tests on the individual orders.   MORPHOLOGY    Platelet Estimate Appears Normal      Fragmented Cells Occasional      Anisocytosis Slight      Polychromasia Occasional      Ovalocytes Occasional      Target Cell Occasional      Acanthocytes Present          All Lab Results:  Results for orders placed or performed during the hospital encounter of 07/08/25   Influenza A & B by Molecular    Collection Time: 07/08/25  4:27 PM    Specimen: Nasal Swab   Result Value Ref Range    INFLUENZA A MOLECULAR Negative Negative    INFLUENZA B MOLECULAR  Negative Negative   COVID-19 Rapid Screening    Collection Time:  07/08/25  4:27 PM   Result Value Ref Range    SARS COV-2 Molecular Negative Negative   Comprehensive metabolic panel    Collection Time: 07/08/25  4:51 PM   Result Value Ref Range    Sodium 137 136 - 145 mmol/L    Potassium 3.9 3.5 - 5.1 mmol/L    Chloride 101 95 - 110 mmol/L    CO2 23 23 - 29 mmol/L    Glucose 106 70 - 110 mg/dL    BUN 13 8 - 23 mg/dL    Creatinine 1.0 0.5 - 1.4 mg/dL    Calcium 9.4 8.7 - 10.5 mg/dL    Protein Total 7.2 6.0 - 8.4 gm/dL    Albumin 3.5 3.5 - 5.2 g/dL    Bilirubin Total 3.2 (H) 0.1 - 1.0 mg/dL     (H) 40 - 150 unit/L    AST 23 11 - 45 unit/L    ALT 19 10 - 44 unit/L    Anion Gap 13 8 - 16 mmol/L    eGFR >60 >60 mL/min/1.73/m2   Troponin I    Collection Time: 07/08/25  4:51 PM   Result Value Ref Range    Troponin-I 0.031 (H) <=0.026 ng/mL   Brain natriuretic peptide    Collection Time: 07/08/25  4:51 PM   Result Value Ref Range    BNP 1,837 (H) 0 - 99 pg/mL   CBC with Differential    Collection Time: 07/08/25  4:51 PM   Result Value Ref Range    WBC 6.07 3.90 - 12.70 K/uL    RBC 3.29 (L) 4.60 - 6.20 M/uL    HGB 8.8 (L) 14.0 - 18.0 gm/dL    HCT 29.6 (L) 40.0 - 54.0 %    MCV 90 82 - 98 fL    MCH 26.7 (L) 27.0 - 31.0 pg    MCHC 29.7 (L) 32.0 - 36.0 g/dL    RDW 28.1 (H) 11.5 - 14.5 %    Platelet Count 195 150 - 450 K/uL    MPV 10.0 9.2 - 12.9 fL    Nucleated RBC 0 <=0 /100 WBC    Neut % 77.3 (H) 38 - 73 %    Lymph % 13.2 (L) 18 - 48 %    Mono % 8.2 4 - 15 %    Eos % 0.7 <=8 %    Basophil % 0.3 <=1.9 %    Imm Grans % 0.3 0.0 - 0.5 %    Neut # 4.69 1.8 - 7.7 K/uL    Lymph # 0.80 (L) 1 - 4.8 K/uL    Mono # 0.50 0.3 - 1 K/uL    Eos # 0.04 <=0.5 K/uL    Baso # 0.02 <=0.2 K/uL    Imm Grans # 0.02 0.00 - 0.04 K/uL   Morphology    Collection Time: 07/08/25  4:51 PM    Specimen: Blood   Result Value Ref Range    Platelet Estimate Appears Normal      Fragmented Cells Occasional     Anisocytosis Slight     Polychromasia Occasional     Ovalocytes Occasional     Target Cell Occasional      Acanthocytes Present          Imaging Results:  Imaging Results              X-Ray Chest AP Portable (Final result)  Result time 07/08/25 17:04:37      Final result by Earl Palacios MD (07/08/25 17:04:37)                   Impression:     See above.    Finalized on: 7/8/2025 5:04 PM By:  Earl Palacios MD  [facility]# [id number]      2025-07-08 17:06:44.170     [facility]               Narrative:    EXAM:  XR CHEST AP PORTABLE    CLINICAL INDICATION: Congestive heart failure.    COMPARISON STUDY:  None.    FINDINGS: Mild cardiomegaly.  Aortic arch atherosclerosis.  Pulmonary vasculature is unremarkable.  There is minimal coarsening of pulmonary markings lower lung distribution, minor scarring or minimal edema.  Right costophrenic angle blunting, pleural thickening versus small pleural fluid.  Lungs are otherwise clear.                                         The EKG was ordered, reviewed, and independently interpreted by the ED provider.  Interpretation time: 4:30  Rate: 65 BPM  Rhythm: atrial fibrillation  Interpretation: prolonged QT. Anterior infarct, age undetermined. Cannot rule out inferior infarct, age undetermined. No STEMI.           The Emergency Provider reviewed the vital signs and test results, which are outlined above.     ED Discussion       5:57 PM: Discussed case with ABHIJIT Vitale (Hospital Medicine). Dr. fuller agrees with current care and management of pt and accepts admission.   Admitting Service: Hospital Medicine  Admitting Physician: Dr. Stout  Admit to: Obs/Tele    5:57 PM: Re-evaluated pt.  I have discussed test results, shared treatment plan, and the need for admission with patient/family/caretaker at bedside. Pt and/or family/caretaker express understanding at this time and agree with all information. All questions answered. Pt/caretaker/family member(s) have no further questions or concerns at this time. Pt is ready for admit.           Medical Decision Making  Differential diagnosis: CHF, pedal  edema, pulmonary edema, respiratory failure    The patient is a arrives tachypneic in AFib.  Recent admission of the [facility] where he notes his pedal edema has gotten worse in his now in his groins all the way to his genitals.  Has a orthopnea that has tachypneic on arrival.  The patient has pulmonary edema on his chest x-ray with the elevated BNP and mild elevation in his troponin.  He has been admitted to Hospital Medicine further evaluation and treatment    Amount and/or Complexity of Data Reviewed  External Data Reviewed: ECG and notes.  Labs: ordered. Decision-making details documented in ED Course.     Details: BNP elevated at 18 37.  Mild elevation in troponin 0.031.  UA negative.  Hemoglobin in his baseline at 8.8 with a normal white count.  Flu COVID negative  Radiology: ordered. Decision-making details documented in ED Course.     Details: Mild pulmonary edema.  ECG/medicine tests: ordered and independent interpretation performed. Decision-making details documented in ED Course.     Details: AFib.  No STEMI  Discussion of management or test interpretation with external provider(s): Discussed the case with hospital medicine graciously accepts    Risk  OTC drugs.  Prescription drug management.  Drug therapy requiring intensive monitoring for toxicity.  Decision regarding hospitalization.  Diagnosis or treatment significantly limited by social determinants of health.  Risk Details: Social determinants: Patient is a resident of the [facility]  Critical care indication: Pulmonary edema with tachypnea    Critical Care  Total time providing critical care: 45 minutes                ED Medication(s):  Medications   furosemide injection 80 mg (80 mg Intravenous Given 7/8/25 8707)       New Prescriptions    No medications on file               Scribe Attestation:   Scribe #1: I performed the above scribed service and the documentation accurately describes the services I performed. I attest to the  accuracy of the note.     Attending:   Physician Attestation Statement for Scribe #1: I,Ulices Jack Jr., MD., personally performed the services described in this documentation, as scribed by Angelina Ochoa, in my presence, and it is both accurate and complete.           Clinical Impression       ICD-10-CM ICD-9-CM   1. Acute congestive heart failure, unspecified heart failure type  I50.9 428.0   2. Shortness of breath  R06.02 786.05   3. Acute pulmonary edema  J81.0 518.4   4. Tachypnea  R06.82 786.06   5. Longstanding persistent atrial fibrillation  I48.11 427.31   6. Troponin level elevated  R79.89 790.6       Disposition:   Disposition: Placed in Observation  Condition: Stable         Ulices Jack Jr., MD  07/08/25 7758

## 2025-07-09 ENCOUNTER — DOCUMENTATION ONLY (OUTPATIENT)
Dept: CARDIOLOGY | Facility: CLINIC | Age: 70
End: 2025-07-09
Payer: OTHER GOVERNMENT

## 2025-07-09 PROBLEM — E11.9 TYPE 2 DIABETES MELLITUS, WITHOUT LONG-TERM CURRENT USE OF INSULIN: Status: ACTIVE | Noted: 2025-07-09

## 2025-07-09 LAB
ABSOLUTE EOSINOPHIL (OHS): 0.07 K/UL
ABSOLUTE MONOCYTE (OHS): 0.68 K/UL (ref 0.3–1)
ABSOLUTE NEUTROPHIL COUNT (OHS): 3.23 K/UL (ref 1.8–7.7)
ANION GAP (OHS): 11 MMOL/L (ref 8–16)
BASOPHILS # BLD AUTO: 0.03 K/UL
BASOPHILS NFR BLD AUTO: 0.6 %
BUN SERPL-MCNC: 12 MG/DL (ref 8–23)
CALCIUM SERPL-MCNC: 8.9 MG/DL (ref 8.7–10.5)
CHLORIDE SERPL-SCNC: 100 MMOL/L (ref 95–110)
CO2 SERPL-SCNC: 27 MMOL/L (ref 23–29)
CREAT SERPL-MCNC: 0.9 MG/DL (ref 0.5–1.4)
EAG (OHS): 126 MG/DL (ref 68–131)
ERYTHROCYTE [DISTWIDTH] IN BLOOD BY AUTOMATED COUNT: 27.7 % (ref 11.5–14.5)
GFR SERPLBLD CREATININE-BSD FMLA CKD-EPI: >60 ML/MIN/1.73/M2
GLUCOSE SERPL-MCNC: 84 MG/DL (ref 70–110)
HBA1C MFR BLD: 6 % (ref 4–5.6)
HCT VFR BLD AUTO: 26.8 % (ref 40–54)
HGB BLD-MCNC: 7.8 GM/DL (ref 14–18)
IMM GRANULOCYTES # BLD AUTO: 0.02 K/UL (ref 0–0.04)
IMM GRANULOCYTES NFR BLD AUTO: 0.4 % (ref 0–0.5)
LYMPHOCYTES # BLD AUTO: 1.17 K/UL (ref 1–4.8)
MCH RBC QN AUTO: 26.3 PG (ref 27–31)
MCHC RBC AUTO-ENTMCNC: 29.1 G/DL (ref 32–36)
MCV RBC AUTO: 90 FL (ref 82–98)
NUCLEATED RBC (/100WBC) (OHS): 0 /100 WBC
PLATELET # BLD AUTO: 183 K/UL (ref 150–450)
PMV BLD AUTO: 10.3 FL (ref 9.2–12.9)
POCT GLUCOSE: 131 MG/DL (ref 70–110)
POCT GLUCOSE: 172 MG/DL (ref 70–110)
POCT GLUCOSE: 86 MG/DL (ref 70–110)
POTASSIUM SERPL-SCNC: 3.4 MMOL/L (ref 3.5–5.1)
RBC # BLD AUTO: 2.97 M/UL (ref 4.6–6.2)
RELATIVE EOSINOPHIL (OHS): 1.3 %
RELATIVE LYMPHOCYTE (OHS): 22.5 % (ref 18–48)
RELATIVE MONOCYTE (OHS): 13.1 % (ref 4–15)
RELATIVE NEUTROPHIL (OHS): 62.1 % (ref 38–73)
SODIUM SERPL-SCNC: 138 MMOL/L (ref 136–145)
WBC # BLD AUTO: 5.2 K/UL (ref 3.9–12.7)

## 2025-07-09 PROCEDURE — 97166 OT EVAL MOD COMPLEX 45 MIN: CPT

## 2025-07-09 PROCEDURE — 80048 BASIC METABOLIC PNL TOTAL CA: CPT | Performed by: INTERNAL MEDICINE

## 2025-07-09 PROCEDURE — 27000207 HC ISOLATION

## 2025-07-09 PROCEDURE — 97162 PT EVAL MOD COMPLEX 30 MIN: CPT

## 2025-07-09 PROCEDURE — 85025 COMPLETE CBC W/AUTO DIFF WBC: CPT | Performed by: INTERNAL MEDICINE

## 2025-07-09 PROCEDURE — 27000221 HC OXYGEN, UP TO 24 HOURS

## 2025-07-09 PROCEDURE — 94761 N-INVAS EAR/PLS OXIMETRY MLT: CPT

## 2025-07-09 PROCEDURE — 63600175 PHARM REV CODE 636 W HCPCS: Performed by: INTERNAL MEDICINE

## 2025-07-09 PROCEDURE — 21400001 HC TELEMETRY ROOM

## 2025-07-09 PROCEDURE — 97530 THERAPEUTIC ACTIVITIES: CPT

## 2025-07-09 PROCEDURE — 36415 COLL VENOUS BLD VENIPUNCTURE: CPT | Performed by: INTERNAL MEDICINE

## 2025-07-09 PROCEDURE — 25000003 PHARM REV CODE 250: Performed by: INTERNAL MEDICINE

## 2025-07-09 PROCEDURE — 97116 GAIT TRAINING THERAPY: CPT

## 2025-07-09 RX ORDER — METOLAZONE 5 MG/1
5 TABLET ORAL DAILY
Status: DISCONTINUED | OUTPATIENT
Start: 2025-07-09 | End: 2025-07-12

## 2025-07-09 RX ADMIN — ATORVASTATIN CALCIUM 40 MG: 40 TABLET, FILM COATED ORAL at 08:07

## 2025-07-09 RX ADMIN — HYDROCORTISONE 10 MG: 10 TABLET ORAL at 08:07

## 2025-07-09 RX ADMIN — INSULIN ASPART 1 UNITS: 100 INJECTION, SOLUTION INTRAVENOUS; SUBCUTANEOUS at 08:07

## 2025-07-09 RX ADMIN — RIVAROXABAN 20 MG: 20 TABLET, FILM COATED ORAL at 04:07

## 2025-07-09 RX ADMIN — FUROSEMIDE 80 MG: 10 INJECTION, SOLUTION INTRAVENOUS at 09:07

## 2025-07-09 RX ADMIN — FUROSEMIDE 80 MG: 10 INJECTION, SOLUTION INTRAVENOUS at 08:07

## 2025-07-09 RX ADMIN — AMIODARONE HYDROCHLORIDE 400 MG: 200 TABLET ORAL at 09:07

## 2025-07-09 RX ADMIN — THIAMINE HCL TAB 100 MG 100 MG: 100 TAB at 09:07

## 2025-07-09 RX ADMIN — HYDROCORTISONE 20 MG: 10 TABLET ORAL at 09:07

## 2025-07-09 RX ADMIN — SERTRALINE HYDROCHLORIDE 50 MG: 50 TABLET ORAL at 09:07

## 2025-07-09 RX ADMIN — METOLAZONE 5 MG: 5 TABLET ORAL at 04:07

## 2025-07-09 RX ADMIN — PANTOPRAZOLE SODIUM 40 MG: 40 TABLET, DELAYED RELEASE ORAL at 09:07

## 2025-07-09 NOTE — PT/OT/SLP EVAL
"Physical Therapy Evaluation    Patient Name:  Ran Reyes Jr.   MRN:  [id number]    Recommendations:     Discharge Recommendations: Moderate Intensity Therapy   Discharge Equipment Recommendations: to be determined by next level of care   Barriers to discharge: None    Assessment:     Ran Reyes Jr. is a 69 y.o. male admitted with a medical diagnosis of CHF (congestive heart failure).  He presents with the following impairments/functional limitations: weakness, impaired endurance, impaired functional mobility, gait instability, impaired balance, decreased coordination, decreased lower extremity function, decreased safety awareness which limits mobility and increases risk of falls. Pt was able to tolerate mobility with RW and CGA. Limited by tightness to BLE. Pt will benefit from continued PT services in order to progress toward baseline.    Rehab Prognosis: Good; patient would benefit from acute skilled PT services to address these deficits and reach maximum level of function.    Recent Surgery: * No surgery found *      Plan:     During this hospitalization, patient to be seen 3 x/week to address the identified rehab impairments via gait training, therapeutic activities, therapeutic exercises, neuromuscular re-education and progress toward the following goals:    Plan of Care Expires:  07/23/25    Subjective     Chief Complaint: tightness to BLE  Patient/Family Comments/goals: to get better/go home  Pain/Comfort:  Pain Rating 1: 0/10  Pain Rating Post-Intervention 1: 0/10    Patients cultural, spiritual, Temple conflicts given the current situation: no    Living Environment:  Pt reported that he no longer has possession of his apartment in [place]. Pt is in-between homes at this time. Most recently pt was in the [facility]  Pt reported that a few months prior to admission, patients level of function was independent, working and driving but has been in/out of the hospital and it's a "huge " "set-back".  Equipment used at home: rollator, nebulizer (portable oxygen). Pt is unclear on where his equipment is currently.  DME owned (not currently used): none.  Upon discharge, patient will have assistance from unknown. Pt reporting that his sister is attempting to help him find housing but is unable to live with her..    Objective:     Communicated with nursing (Fadia) and performed chart review via epic system prior to session.  Patient found up in chair with peripheral IV, telemetry, oxygen  upon PT entry to room.    General Precautions: Standard, fall, contact  Orthopedic Precautions:N/A   Braces: N/A  Respiratory Status: Nasal cannula, flow 5 L/min    Exams:  Cognitive Exam:  Patient is oriented to Person, Place, Time, and Situation  Gross Motor Coordination:  WFL  Postural Exam:  Patient presented with the following abnormalities:    -       Rounded shoulders  -       Forward head  RLE ROM: WFL  RLE Strength: WFL  LLE ROM: WFL  LLE Strength: WFL    Functional Mobility:  Transfers:     Sit to Stand:  contact guard assistance with rolling walker  Gait: 5 ft x 2 CGA forward and backward with RW, demonstrated slow pace, flexed posture, wide CARLOS, shuffled steps  Balance: fair dynamic standing balance      AM-PAC 6 CLICK MOBILITY  Total Score:18       Treatment & Education:  Educated pt on benefits of consistent participation in PT services to meet functional goals. Educated pt on seated therex to promote strength, circulation and joint mobility. Exercises included AP, LAQ, marching x 5-10 reps. Educated to perform exercises intermittently throughout day to tolerance. Educated pt on importance of sitting OOB to promote endurance and overall activity tolerance. Educated pt on call don't fall policy and use of call button to alert nursing staff of needs (including to assist in/out of bed). Pt expressed understanding.     Patient left up in chair with all lines intact, call button in reach, and nursing " notified.    GOALS:   Multidisciplinary Problems       Physical Therapy Goals          Problem: Physical Therapy    Goal Priority Disciplines Outcome Interventions   Physical Therapy Goal     PT, PT/OT     Description: Pt will perform bed mobility independently in order to participate in EOB activity.  Pt will perform transfers with independently in order to participate in OOB activity.  Pt will ambulate 350 ft SPV with LRAD in order to participate in daily tasks.   Pt will improve functional status as evidenced by 2 pt increase in AMPAC score.                        DME Justifications:  TBD    History:     Past Medical History:   Diagnosis Date    A-fib     Anticoagulant long-term use     Anxiety     Aortic aneurysm     Cataract     CHF (congestive heart failure)     Chronic atrial fibrillation     COPD (chronic obstructive pulmonary disease)     Coronary artery disease     Depression     HLD (hyperlipidemia)     Hypertension     Mitral regurgitation     PAD (peripheral artery disease)     Primary open angle glaucoma (POAG)        Past Surgical History:   Procedure Laterality Date    ANGIOGRAM, CORONARY, WITH LEFT HEART CATHETERIZATION N/A 03/26/2024    Procedure: Angiogram, Coronary, with Left Heart Cath;  Surgeon: Adriano Montiel MD;  Location: [facility] CATH LAB;  Service: Cardiology;  Laterality: N/A;    ATHERECTOMY OF PERIPHERAL VESSEL Left 09/12/2022    LEFT SFA ATHERECTOMY, BALLOON ANGIOPLASTY    CATARACT EXTRACTION W/  INTRAOCULAR LENS IMPLANT Left 03/09/2023    Procedure: EXTRACTION, CATARACT, WITH IOL INSERTION;  Surgeon: Georgi Borja MD;  Location: [facility] OR;  Service: Ophthalmology;  Laterality: Left;  19.5  mac    EGD, WITH CLOSED BIOPSY  03/22/2024    Procedure: EGD, WITH CLOSED BIOPSY;  Surgeon: Ronald Calderon MD;  Location: [facility] ENDOSCOPY;  Service: Gastroenterology;;    ESOPHAGOGASTRODUODENOSCOPY N/A 03/22/2024    Procedure: EGD;  Surgeon: Ronald Calderon MD;  Location: [facility]  [facility] ENDOSCOPY;  Service: Gastroenterology;  Laterality: N/A;    Heart Stent N/A     > 10yrs. AGO    INCISION AND DRAINAGE N/A 03/18/2024    Procedure: Incision and Drainage;  Surgeon: Fahad Rivas MD;  Location: [facility];  Service: Urology;  Laterality: N/A;  I&D SCROTAL ABSCESS    INSERTION OF STENT INTO PERIPHERAL VESSEL Right 10/17/2022    RIGHT SFA ATHERECTOMY, BALLOON ANGIOPLASTY, STENT; RIGHT ANTERIOR TIBIAL ARTERY ATHERECTOMY, BALLOON ANGIOPLASTY    ORCHIECTOMY Left 03/18/2024    Procedure: ORCHIECTOMY;  Surgeon: Fahad Rivas MD;  Location: [facility];  Service: Urology;  Laterality: Left;       Time Tracking:     PT Received On:    PT Start Time: 1210     PT Stop Time: 1232  PT Total Time (min): 22 min     Billable Minutes: Evaluation 11 and Gait Training 11      07/09/2025

## 2025-07-09 NOTE — ASSESSMENT & PLAN NOTE
Patient has permanent atrial fibrillation. Patient is currently in atrial fibrillation. [id number] Score: 2. The patients heart rate in the last 24 hours is as follows:  Pulse  Min: 63  Max: 88     Antiarrhythmics  amiodarone tablet 400 mg, Daily, Oral    Anticoagulants  rivaroxaban tablet 20 mg, With dinner, Oral    Plan  - Replete lytes with a goal of K>4, Mg >2  - Patient is anticoagulated, see medications listed above.  - Patient's afib is currently controlled  -

## 2025-07-09 NOTE — H&P
[facility]  History & Physical    Patient Name: Ran Reyes Jr.  MRN: [id number]  Patient Class: OP- Observation  Admission Date: 7/8/2025  Attending Physician: Savanna Gomez MD   Primary Care Provider: Abiodun Recinos DO         Patient information was obtained from patient, past medical records, and ER records.     Subjective:     Principal Problem:CHF (congestive heart failure)    Chief Complaint:   Chief Complaint   Patient presents with    Shortness of Breath     EMS reports pt. Was sent over from [facility] for increased SOB and extremity edema for the last week. Pt. Is on home o2 at 3 liters via N/C.         HPI: The patient is a 70 yo male with past medical history of CHF, afib, depression and glaucoma who presented to the ED with shortness of breath and BLE edema. He reports started a week ago and has worsened. He was unable to lay flat. He has not slept well the last couple of nights. Workup in the ED revealed elevated BNP of 1837. He was given IV lasix in the ED. Hospital medicine was consulted. Patient placed in observation.     Past Medical History:   Diagnosis Date    A-fib     Anticoagulant long-term use     Anxiety     Aortic aneurysm     Cataract     CHF (congestive heart failure)     Chronic atrial fibrillation     COPD (chronic obstructive pulmonary disease)     Coronary artery disease     Depression     HLD (hyperlipidemia)     Hypertension     Mitral regurgitation     PAD (peripheral artery disease)     Primary open angle glaucoma (POAG)        Past Surgical History:   Procedure Laterality Date    ANGIOGRAM, CORONARY, WITH LEFT HEART CATHETERIZATION N/A 03/26/2024    Procedure: Angiogram, Coronary, with Left Heart Cath;  Surgeon: Adriano Montiel MD;  Location: [facility] CATH LAB;  Service: Cardiology;  Laterality: N/A;    ATHERECTOMY OF PERIPHERAL VESSEL Left 09/12/2022    LEFT SFA ATHERECTOMY, BALLOON ANGIOPLASTY    CATARACT EXTRACTION W/   INTRAOCULAR LENS IMPLANT Left 03/09/2023    Procedure: EXTRACTION, CATARACT, WITH IOL INSERTION;  Surgeon: Georgi Borja MD;  Location: [facility];  Service: Ophthalmology;  Laterality: Left;  19.5  mac    EGD, WITH CLOSED BIOPSY  03/22/2024    Procedure: EGD, WITH CLOSED BIOPSY;  Surgeon: Ronald Calderon MD;  Location: [facility] ENDOSCOPY;  Service: Gastroenterology;;    ESOPHAGOGASTRODUODENOSCOPY N/A 03/22/2024    Procedure: EGD;  Surgeon: Ronald Calderon MD;  Location: [facility] ENDOSCOPY;  Service: Gastroenterology;  Laterality: N/A;    Heart Stent N/A     > 10yrs. AGO    INCISION AND DRAINAGE N/A 03/18/2024    Procedure: Incision and Drainage;  Surgeon: Fahad Rivas MD;  Location: [facility];  Service: Urology;  Laterality: N/A;  I&D SCROTAL ABSCESS    INSERTION OF STENT INTO PERIPHERAL VESSEL Right 10/17/2022    RIGHT SFA ATHERECTOMY, BALLOON ANGIOPLASTY, STENT; RIGHT ANTERIOR TIBIAL ARTERY ATHERECTOMY, BALLOON ANGIOPLASTY    ORCHIECTOMY Left 03/18/2024    Procedure: ORCHIECTOMY;  Surgeon: Fahad Rivas MD;  Location: [facility];  Service: Urology;  Laterality: Left;       Review of patient's allergies indicates:  No Known Allergies    Current Facility-Administered Medications on File Prior to Encounter   Medication    0.9%  NaCl infusion    LIDOcaine (PF) 10 mg/ml (1%) injection 10 mg    LIDOcaine (PF) 10 mg/ml (1%) injection 10 mg    ondansetron injection 4 mg    prochlorperazine injection Soln 5 mg    [DISCONTINUED] furosemide tablet    [DISCONTINUED] hydrocortisone tablet    [DISCONTINUED] hydrocortisone tablet    [DISCONTINUED] nicotine 7 mg/24 hr    [DISCONTINUED] polyethylene glycol packet    [DISCONTINUED] rivaroxaban tablet    [DISCONTINUED] senna tablet    [DISCONTINUED] simethicone chewable tablet     No current outpatient medications on file prior to encounter.     Family History       Problem Relation (Age of Onset)    Cancer Mother    Heart failure Father    Hypertension  Mother, Father    No Known Problems Sister    Stroke Father          Tobacco Use    Smoking status: Every Day     Current packs/day: 0.50     Average packs/day: 0.8 packs/day for 50.5 years (41.0 ttl pk-yrs)     Types: Cigarettes     Start date: 1975     Passive exposure: Current    Smokeless tobacco: Never    Tobacco comments:     States smoke 2-3 cigarettes per day   Substance and Sexual Activity    Alcohol use: Yes     Alcohol/week: 3.0 standard drinks of alcohol     Types: 3 Cans of beer per week     Comment: socially    Drug use: Not Currently     Frequency: 1.0 times per week     Types: Marijuana     Comment: no use in over 1 year    Sexual activity: Not Currently     Partners: Female     Review of Systems   Constitutional:  Positive for activity change and appetite change.   Respiratory:  Positive for shortness of breath.    Cardiovascular:  Positive for leg swelling.   All other systems reviewed and are negative.    Objective:     Vital Signs (Most Recent):  Temp: 98.7 °F (37.1 °C) (07/08/25 1545)  Pulse: 63 (07/08/25 1905)  Resp: 20 (07/08/25 1545)  BP: 126/88 (07/08/25 1905)  SpO2: 100 % (07/08/25 1830) Vital Signs (24h Range):  Temp:  [98.7 °F (37.1 °C)] 98.7 °F (37.1 °C)  Pulse:  [63-88] 63  Resp:  [20] 20  SpO2:  [97 %-100 %] 100 %  BP: (116-144)/(69-88) 126/88     Weight: 102.3 kg (225 lb 8.5 oz)  Body mass index is 32.36 kg/m².     Physical Exam  HENT:      Head: Normocephalic and atraumatic.   Cardiovascular:      Rate and Rhythm: Normal rate and regular rhythm.      Heart sounds: No murmur heard.  Pulmonary:      Effort: Pulmonary effort is normal. No respiratory distress.      Breath sounds: Normal breath sounds. No wheezing.   Abdominal:      General: Bowel sounds are normal. There is no distension.      Palpations: Abdomen is soft.      Tenderness: There is no abdominal tenderness.      Comments: Abdominal wall edema   Musculoskeletal:         General: Swelling present.      Right lower leg:  Edema present.      Left lower leg: Edema present.   Skin:     General: Skin is warm and dry.   Neurological:      Mental Status: He is alert and oriented to person, place, and time. Mental status is at baseline.                Significant Labs: All pertinent labs within the past 24 hours have been reviewed.  Recent Lab Results         07/08/25  1651   07/08/25  1627        Influenza A, Molecular   Negative       Influenza B, Molecular   Negative       Acanthocytes Present         Albumin 3.5                  ALT 19         Anion Gap 13         Aniso Slight         AST 23         Baso # 0.02         Basophil % 0.3         BILIRUBIN TOTAL 3.2  Comment: For infants and newborns, interpretation of results should be based   on gestational age, weight and in agreement with clinical   observations.    Premature Infant recommended reference ranges:   0-24 hours:  <8.0 mg/dL   24-48 hours: <12.0 mg/dL   3-5 days:    <15.0 mg/dL   6-29 days:   <15.0 mg/dL         BNP 1,837  Comment: Values of less than 100 pg/ml are consistent with non-CHF populations.          BUN 13         Calcium 9.4         Chloride 101         CO2 23         SARS COV-2 MOLECULAR   Negative  Comment: This test utilizes isothermal nucleic acid amplification technology to detect the SARS-CoV-2 RdRp nucleic acid segment. The analytical sensitivity (limit of detection) is 500 copies/swab.     A POSITIVE result is indicative of the presence of SARS-CoV-2 RNA; clinical correlation with patient history and other diagnostic information is necessary to determine patient infection status.    A NEGATIVE result means that SARS-CoV-2 nucleic acids are not present above the limit of detection. A NEGATIVE result should be treated as presumptive. It does not rule out the possibility of COVID-19 and should not be the sole basis for treatment decisions.    This test is Food and Drug Administration (FDA) approved.  Performance characteristics of this test has been  independently verified by [facility] Department of Pathology and Laboratory Medicine.       Creatinine 1.0         eGFR >60  Comment: Estimated GFR calculated using the CKD-EPI creatinine (2021) equation.         Eos # 0.04         Eos % 0.7         Fragmented Cells Occasional         Glucose 106         Gran # (ANC) 4.69         Hematocrit 29.6         Hemoglobin 8.8         Immature Grans (Abs) 0.02  Comment: Mild elevation in immature granulocytes is non specific and can be seen in a variety of conditions including stress response, acute inflammation, trauma and pregnancy. Correlation with other laboratory and clinical findings is essential.         Immature Granulocytes 0.3         Lymph # 0.80         Lymph % 13.2         MCH 26.7         MCHC 29.7         MCV 90         Mono # 0.50         Mono % 8.2         MPV 10.0         Neut % 77.3         nRBC 0         Ovalocytes Occasional         Platelet Estimate Appears Normal         Platelet Count 195         Poly Occasional         Potassium 3.9         PROTEIN TOTAL 7.2         RBC 3.29         RDW 28.1         Sodium 137         Target Cells Occasional         Troponin I 0.031  Comment: The reference interval for Troponin I represents the 99th percentile cutoff for our facility and is consistent with 3rd generation assay performance.         WBC 6.07                 Significant Imaging: I have reviewed all pertinent imaging results/findings within the past 24 hours.  Assessment/Plan:     Assessment & Plan  CHF (congestive heart failure)  Patient has Combined Systolic and Diastolic heart failure that is Acute on chronic. On presentation their CHF was decompensated. Evidence of decompensated CHF on presentation includes: edema, paroxysmal nocturnal dyspnea (PND), dyspnea on exertion ([place]), and shortness of breath. The etiology of their decompensation is likely dietary indiscretion and increased fluid intake. Most recent BNP and echo results are listed  below.  Recent Labs     07/08/25  1651   BNP 1,837*     Latest ECHO  Results for orders placed during the hospital encounter of 05/23/25    Echo Saline Bubble? No    Interpretation Summary    Left Ventricle: The left ventricle is moderately dilated. LVEDD 6.6 cm. There is severely reduced systolic function with a visually estimated ejection fraction of 20 - 25%. There is diastolic dysfunction.    Right Ventricle: Systolic function is mildly reduced.    Mitral Valve: There is moderate (2+) regurgitation.    Tricuspid Valve: There is mild to moderate (1-2+)regurgitation.    Pulmonary Artery: The estimated pulmonary artery systolic pressure is 53 mmHg.    Current Heart Failure Medications  furosemide injection 80 mg, Every 12 hours, Intravenous    Plan  - Monitor strict I&Os and daily weights.    - Place on telemetry  - Low sodium diet  - Place on fluid restriction of 1.5 L.   - Cardiology has not been consulted  - The patient's volume status is being monitored  - IV lasix          Chronic atrial fibrillation  Patient has permanent atrial fibrillation. Patient is currently in atrial fibrillation. [id number] Score: 2. The patients heart rate in the last 24 hours is as follows:  Pulse  Min: 63  Max: 88     Antiarrhythmics  amiodarone tablet 400 mg, Daily, Oral    Anticoagulants  rivaroxaban tablet 20 mg, With dinner, Oral    Plan  - Replete lytes with a goal of K>4, Mg >2  - Patient is anticoagulated, see medications listed above.  - Patient's afib is currently controlled  -       VTE Risk Mitigation (From admission, onward)           Ordered     rivaroxaban tablet 20 mg  With dinner         07/08/25 1920     IP VTE HIGH RISK PATIENT  Once         07/08/25 1901     Place sequential compression device  Until discontinued         07/08/25 1901                    SDOH Screening:  The patient declined to be screened for utility difficulties, food insecurity, transport difficulties, housing insecurity, and interpersonal  safety, so no concerns could be identified this admission.                 On 07/09/2025, patient should be placed in hospital observation services under my care.             Savanna Gomez MD  Department of Hospital Medicine  '[place] - Telemetry ([facility])

## 2025-07-09 NOTE — SUBJECTIVE & OBJECTIVE
Interval History: f/u CHF patient with significant edema added metolazone    Review of Systems  Objective:     Vital Signs (Most Recent):  Temp: 98.6 °F (37 °C) (07/09/25 1610)  Pulse: 64 (07/09/25 1610)  Resp: 18 (07/09/25 1610)  BP: 110/68 (07/09/25 1612)  SpO2: 100 % (07/09/25 1610) Vital Signs (24h Range):  Temp:  [97.5 °F (36.4 °C)-98.6 °F (37 °C)] 98.6 °F (37 °C)  Pulse:  [48-78] 64  Resp:  [16-18] 18  SpO2:  [97 %-100 %] 100 %  BP: (101-130)/(64-88) 110/68     Weight: 103.7 kg (228 lb 9.9 oz)  Body mass index is 38.04 kg/m².    Intake/Output Summary (Last 24 hours) at 7/9/2025 1849  Last data filed at 7/9/2025 1829  Gross per 24 hour   Intake 1390 ml   Output 5750 ml   Net -4360 ml         Physical Exam  HENT:      Head: Normocephalic and atraumatic.   Cardiovascular:      Rate and Rhythm: Normal rate and regular rhythm.      Heart sounds: No murmur heard.  Pulmonary:      Effort: Pulmonary effort is normal. No respiratory distress.      Breath sounds: Normal breath sounds. No wheezing.   Abdominal:      General: Bowel sounds are normal. There is no distension.      Palpations: Abdomen is soft.      Tenderness: There is no abdominal tenderness.   Musculoskeletal:         General: Swelling present.      Right lower leg: Edema present.      Left lower leg: Edema present.   Skin:     General: Skin is warm and dry.   Neurological:      Mental Status: He is alert and oriented to person, place, and time. Mental status is at baseline.               Significant Labs: All pertinent labs within the past 24 hours have been reviewed.  Recent Lab Results  (Last 5 results in the past 24 hours)        07/09/25  1622   07/09/25  0537   07/09/25  0453   07/08/25  2148   07/08/25  1917        Anion Gap     11           Baso #     0.03           Basophil %     0.6           BUN     12           Calcium     8.9           Chloride     100           CO2     27           Creatinine     0.9           eGFR     >60  Comment: Estimated  GFR calculated using the CKD-EPI creatinine (2021) equation.           Eos #     0.07           Eos %     1.3           Estimated Avg Glucose         126       Glucose     84           Gran # (ANC)     3.23           Hematocrit     26.8           Hemoglobin     7.8           Hemoglobin A1C External         6.0  Comment: ADA Screening Guidelines:  5.7-6.4%  Consistent with prediabetes  >=6.5%  Consistent with diabetes    High levels of fetal hemoglobin interfere with the HbA1C  assay. Heterozygous hemoglobin variants (HbS, HgC, etc)do  not significantly interfere with this assay.   However, presence of multiple variants may affect accuracy.       Immature Grans (Abs)     0.02  Comment: Mild elevation in immature granulocytes is non specific and can be seen in a variety of conditions including stress response, acute inflammation, trauma and pregnancy. Correlation with other laboratory and clinical findings is essential.           Immature Granulocytes     0.4           Lymph #     1.17           Lymph %     22.5           MCH     26.3           MCHC     29.1           MCV     90           Mono #     0.68           Mono %     13.1           MPV     10.3           Neut %     62.1           nRBC     0           Platelet Count     183           POCT Glucose 131   86     94         Potassium     3.4           RBC     2.97           RDW     27.7           Sodium     138           WBC     5.20                                  Significant Imaging: I have reviewed all pertinent imaging results/findings within the past 24 hours.

## 2025-07-09 NOTE — PT/OT/SLP EVAL
"Occupational Therapy   Evaluation    Name: Ran Reyes Jr.  MRN: [id number]  Admitting Diagnosis: CHF (congestive heart failure)  Recent Surgery: * No surgery found *      Recommendations:     Discharge Recommendations: Moderate Intensity Therapy  Discharge Equipment Recommendations:  none  Barriers to discharge:  Decreased caregiver support, Inaccessible home environment    Assessment:     Ran Reyes Jr. is a 69 y.o. male with a medical diagnosis of CHF (congestive heart failure).  He presents from [facility] d/t SOB. He currently requires high O2 needs and is quick to fatigue. He would benefit from returning to moderate intensity therapy. Performance deficits affecting function: weakness, impaired endurance, impaired self care skills, impaired sensation, impaired functional mobility, impaired balance, impaired cardiopulmonary response to activity.      Rehab Prognosis: Good; patient would benefit from acute skilled OT services to address these deficits and reach maximum level of function.       Plan:     Patient to be seen 2 x/week to address the above listed problems via self-care/home management, therapeutic activities, therapeutic exercises  Plan of Care Expires: 07/23/25  Plan of Care Reviewed with: patient    Subjective     Chief Complaint:  "I have had a hard time lately. I was in a medically induced coma for 45 days." [facility]  Patient/Family Comments/goals: To return to prior level of function.     Occupational Profile:  Living Environment: From [facility], reports he is "between homes" at this time.   Previous level of function: Reports needing assist with ADLs since initial hospital stay.  Roles and Routines: Reports several months ago he was working; however, upon lengthy chart review, it appears that the patient has been in and out of hospitals since April 2025.   Equipment Used at Home: rollator, nebulizer, oxygen  Assistance upon " Discharge: Unknown.     Pain/Comfort:  Pain Rating 1: 8/10  Location - Side 1: Bilateral  Location - Orientation 1: posterior  Location 1: knee  Pain Addressed 1: Reposition (gentle BLE ROM)    Patients cultural, spiritual, Oriental orthodox conflicts given the current situation:  no    Objective:     Communicated with: nurse prior to session.  Patient found up in chair with peripheral IV, telemetry, oxygen upon OT entry to room.    General Precautions: Standard, contact, fall  Orthopedic Precautions: N/A  Braces: N/A  Respiratory Status: Nasal cannula, flow 5 L/min    Occupational Performance:    Bed Mobility:    Not tested.     Functional Mobility/Transfers:  Patient completed Sit <> Stand Transfer with contact guard assistance  with  rolling walker   Patient completed Bed <> Chair Transfer using Step Transfer technique with contact guard assistance with rolling walker  Functional Mobility: To increase cardiovascular endurance, he participated in ambulation 10ft with RW and CGA.    Activities of Daily Living:  Lower Body Dressing: contact guard assistance to don/doff socks  Toileting: set up with  urinal     Cognitive/Visual Perceptual:  Cognitive/Psychosocial Skills:     -       Oriented to: Person, Place, Time, and Situation   -       Follows Commands/attention:Follows multistep  commands  -       Memory: No Deficits noted    Physical Exam:  Balance:    -       WFL    AMPAC 6 Click ADL:  AMPAC Total Score: 18    Treatment & Education:  Patient was educated on role of OT, POC, Call Don't Fall precautions, and progressive mobility. Educated to call for assistance to perform toileting to maintain maximal functional independence.       Patient left up in chair with all lines intact, call button in reach, and chair alarm on, nurse notified of I/O    GOALS:   Multidisciplinary Problems       Occupational Therapy Goals          Problem: Occupational Therapy    Goal Priority Disciplines Outcome Interventions   Occupational  Therapy Goal     OT, PT/OT     Description: Goals to be met by: 7/23/25     Patient will increase functional independence with ADLs by performing:    LE Dressing with Stand-by Assistance.  Grooming while standing at sink with Stand-by Assistance.  Toileting from toilet with Stand-by Assistance for hygiene and clothing management.   Toilet transfer to toilet with Stand-by Assistance.                         DME Justifications:  No DME recommended requiring DME justifications    History:     Past Medical History:   Diagnosis Date    A-fib     Anticoagulant long-term use     Anxiety     Aortic aneurysm     Cataract     CHF (congestive heart failure)     Chronic atrial fibrillation     COPD (chronic obstructive pulmonary disease)     Coronary artery disease     Depression     HLD (hyperlipidemia)     Hypertension     Mitral regurgitation     PAD (peripheral artery disease)     Primary open angle glaucoma (POAG)          Past Surgical History:   Procedure Laterality Date    ANGIOGRAM, CORONARY, WITH LEFT HEART CATHETERIZATION N/A 03/26/2024    Procedure: Angiogram, Coronary, with Left Heart Cath;  Surgeon: Adriano Montiel MD;  Location: [facility] CATH LAB;  Service: Cardiology;  Laterality: N/A;    ATHERECTOMY OF PERIPHERAL VESSEL Left 09/12/2022    LEFT SFA ATHERECTOMY, BALLOON ANGIOPLASTY    CATARACT EXTRACTION W/  INTRAOCULAR LENS IMPLANT Left 03/09/2023    Procedure: EXTRACTION, CATARACT, WITH IOL INSERTION;  Surgeon: Georgi Borja MD;  Location: [facility] OR;  Service: Ophthalmology;  Laterality: Left;  19.5  mac    EGD, WITH CLOSED BIOPSY  03/22/2024    Procedure: EGD, WITH CLOSED BIOPSY;  Surgeon: Ronald Calderon MD;  Location: [facility] ENDOSCOPY;  Service: Gastroenterology;;    ESOPHAGOGASTRODUODENOSCOPY N/A 03/22/2024    Procedure: EGD;  Surgeon: Ronald Calderon MD;  Location: [facility] ENDOSCOPY;  Service: Gastroenterology;  Laterality: N/A;    Heart Stent N/A     > 10yrs. AGO    INCISION AND  DRAINAGE N/A 03/18/2024    Procedure: Incision and Drainage;  Surgeon: Fahad Rivas MD;  Location: [facility] OR;  Service: Urology;  Laterality: N/A;  I&D SCROTAL ABSCESS    INSERTION OF STENT INTO PERIPHERAL VESSEL Right 10/17/2022    RIGHT SFA ATHERECTOMY, BALLOON ANGIOPLASTY, STENT; RIGHT ANTERIOR TIBIAL ARTERY ATHERECTOMY, BALLOON ANGIOPLASTY    ORCHIECTOMY Left 03/18/2024    Procedure: ORCHIECTOMY;  Surgeon: Fahad Rivas MD;  Location[facility];  Service: Urology;  Laterality: Left;       Time Tracking:     OT Date of Treatment: 07/09/25  OT Start Time: 1201  OT Stop Time: 1227  OT Total Time (min): 26 min    Billable Minutes:Evaluation 11  Therapeutic Activity 15    7/9/2025

## 2025-07-09 NOTE — HPI
The patient is a 70 yo male with past medical history of CHF, afib, depression and glaucoma who presented to the ED with shortness of breath and BLE edema. He reports started a week ago and has worsened. He was unable to lay flat. He has not slept well the last couple of nights. Workup in the ED revealed elevated BNP of 1837. He was given IV lasix in the ED. Hospital medicine was consulted. Patient placed in observation.

## 2025-07-09 NOTE — PLAN OF CARE
CGA for transfer from chair, ambulation 10ft with RW, and footwear. Requires high O2 needs and quick to fatigue. Would benefit from moderate intensity therapy.

## 2025-07-09 NOTE — SUBJECTIVE & OBJECTIVE
Past Medical History:   Diagnosis Date    A-fib     Anticoagulant long-term use     Anxiety     Aortic aneurysm     Cataract     CHF (congestive heart failure)     Chronic atrial fibrillation     COPD (chronic obstructive pulmonary disease)     Coronary artery disease     Depression     HLD (hyperlipidemia)     Hypertension     Mitral regurgitation     PAD (peripheral artery disease)     Primary open angle glaucoma (POAG)        Past Surgical History:   Procedure Laterality Date    ANGIOGRAM, CORONARY, WITH LEFT HEART CATHETERIZATION N/A 03/26/2024    Procedure: Angiogram, Coronary, with Left Heart Cath;  Surgeon: Adriano Montiel MD;  Location: [facility] CATH LAB;  Service: Cardiology;  Laterality: N/A;    ATHERECTOMY OF PERIPHERAL VESSEL Left 09/12/2022    LEFT SFA ATHERECTOMY, BALLOON ANGIOPLASTY    CATARACT EXTRACTION W/  INTRAOCULAR LENS IMPLANT Left 03/09/2023    Procedure: EXTRACTION, CATARACT, WITH IOL INSERTION;  Surgeon: Georgi Borja MD;  Location: [facility];  Service: Ophthalmology;  Laterality: Left;  19.5  mac    EGD, WITH CLOSED BIOPSY  03/22/2024    Procedure: EGD, WITH CLOSED BIOPSY;  Surgeon: Ronald Calderon MD;  Location: [facility] ENDOSCOPY;  Service: Gastroenterology;;    ESOPHAGOGASTRODUODENOSCOPY N/A 03/22/2024    Procedure: EGD;  Surgeon: Ronald Calderon MD;  Location: [facility] ENDOSCOPY;  Service: Gastroenterology;  Laterality: N/A;    Heart Stent N/A     > 10yrs. AGO    INCISION AND DRAINAGE N/A 03/18/2024    Procedure: Incision and Drainage;  Surgeon: Fahad Rivas MD;  Location: [facility];  Service: Urology;  Laterality: N/A;  I&D SCROTAL ABSCESS    INSERTION OF STENT INTO PERIPHERAL VESSEL Right 10/17/2022    RIGHT SFA ATHERECTOMY, BALLOON ANGIOPLASTY, STENT; RIGHT ANTERIOR TIBIAL ARTERY ATHERECTOMY, BALLOON ANGIOPLASTY    ORCHIECTOMY Left 03/18/2024    Procedure: ORCHIECTOMY;  Surgeon: Fahad Rivas MD;  Location: [facility];  Service: Urology;  Laterality: Left;        Review of patient's allergies indicates:  No Known Allergies    Current Facility-Administered Medications on File Prior to Encounter   Medication    0.9%  NaCl infusion    LIDOcaine (PF) 10 mg/ml (1%) injection 10 mg    LIDOcaine (PF) 10 mg/ml (1%) injection 10 mg    ondansetron injection 4 mg    prochlorperazine injection Soln 5 mg    [DISCONTINUED] furosemide tablet    [DISCONTINUED] hydrocortisone tablet    [DISCONTINUED] hydrocortisone tablet    [DISCONTINUED] nicotine 7 mg/24 hr    [DISCONTINUED] polyethylene glycol packet    [DISCONTINUED] rivaroxaban tablet    [DISCONTINUED] senna tablet    [DISCONTINUED] simethicone chewable tablet     No current outpatient medications on file prior to encounter.     Family History       Problem Relation (Age of Onset)    Cancer Mother    Heart failure Father    Hypertension Mother, Father    No Known Problems Sister    Stroke Father          Tobacco Use    Smoking status: Every Day     Current packs/day: 0.50     Average packs/day: 0.8 packs/day for 50.5 years (41.0 ttl pk-yrs)     Types: Cigarettes     Start date: 1975     Passive exposure: Current    Smokeless tobacco: Never    Tobacco comments:     States smoke 2-3 cigarettes per day   Substance and Sexual Activity    Alcohol use: Yes     Alcohol/week: 3.0 standard drinks of alcohol     Types: 3 Cans of beer per week     Comment: socially    Drug use: Not Currently     Frequency: 1.0 times per week     Types: Marijuana     Comment: no use in over 1 year    Sexual activity: Not Currently     Partners: Female     Review of Systems   Constitutional:  Positive for activity change and appetite change.   Respiratory:  Positive for shortness of breath.    Cardiovascular:  Positive for leg swelling.   All other systems reviewed and are negative.    Objective:     Vital Signs (Most Recent):  Temp: 98.7 °F (37.1 °C) (07/08/25 1545)  Pulse: 63 (07/08/25 1905)  Resp: 20 (07/08/25 1545)  BP: 126/88 (07/08/25 1905)  SpO2: 100 %  (07/08/25 1830) Vital Signs (24h Range):  Temp:  [98.7 °F (37.1 °C)] 98.7 °F (37.1 °C)  Pulse:  [63-88] 63  Resp:  [20] 20  SpO2:  [97 %-100 %] 100 %  BP: (116-144)/(69-88) 126/88     Weight: 102.3 kg (225 lb 8.5 oz)  Body mass index is 32.36 kg/m².     Physical Exam  HENT:      Head: Normocephalic and atraumatic.   Cardiovascular:      Rate and Rhythm: Normal rate and regular rhythm.      Heart sounds: No murmur heard.  Pulmonary:      Effort: Pulmonary effort is normal. No respiratory distress.      Breath sounds: Normal breath sounds. No wheezing.   Abdominal:      General: Bowel sounds are normal. There is no distension.      Palpations: Abdomen is soft.      Tenderness: There is no abdominal tenderness.      Comments: Abdominal wall edema   Musculoskeletal:         General: Swelling present.      Right lower leg: Edema present.      Left lower leg: Edema present.   Skin:     General: Skin is warm and dry.   Neurological:      Mental Status: He is alert and oriented to person, place, and time. Mental status is at baseline.                Significant Labs: All pertinent labs within the past 24 hours have been reviewed.  Recent Lab Results         07/08/25  1651   07/08/25  1627        Influenza A, Molecular   Negative       Influenza B, Molecular   Negative       Acanthocytes Present         Albumin 3.5                  ALT 19         Anion Gap 13         Aniso Slight         AST 23         Baso # 0.02         Basophil % 0.3         BILIRUBIN TOTAL 3.2  Comment: For infants and newborns, interpretation of results should be based   on gestational age, weight and in agreement with clinical   observations.    Premature Infant recommended reference ranges:   0-24 hours:  <8.0 mg/dL   24-48 hours: <12.0 mg/dL   3-5 days:    <15.0 mg/dL   6-29 days:   <15.0 mg/dL         BNP 1,837  Comment: Values of less than 100 pg/ml are consistent with non-CHF populations.          BUN 13         Calcium 9.4         Chloride  101         CO2 23         SARS COV-2 MOLECULAR   Negative  Comment: This test utilizes isothermal nucleic acid amplification technology to detect the SARS-CoV-2 RdRp nucleic acid segment. The analytical sensitivity (limit of detection) is 500 copies/swab.     A POSITIVE result is indicative of the presence of SARS-CoV-2 RNA; clinical correlation with patient history and other diagnostic information is necessary to determine patient infection status.    A NEGATIVE result means that SARS-CoV-2 nucleic acids are not present above the limit of detection. A NEGATIVE result should be treated as presumptive. It does not rule out the possibility of COVID-19 and should not be the sole basis for treatment decisions.    This test is Food and Drug Administration (FDA) approved.  Performance characteristics of this test has been independently verified by [facility] Department of Pathology and Laboratory Medicine.       Creatinine 1.0         eGFR >60  Comment: Estimated GFR calculated using the CKD-EPI creatinine (2021) equation.         Eos # 0.04         Eos % 0.7         Fragmented Cells Occasional         Glucose 106         Gran # (ANC) 4.69         Hematocrit 29.6         Hemoglobin 8.8         Immature Grans (Abs) 0.02  Comment: Mild elevation in immature granulocytes is non specific and can be seen in a variety of conditions including stress response, acute inflammation, trauma and pregnancy. Correlation with other laboratory and clinical findings is essential.         Immature Granulocytes 0.3         Lymph # 0.80         Lymph % 13.2         MCH 26.7         MCHC 29.7         MCV 90         Mono # 0.50         Mono % 8.2         MPV 10.0         Neut % 77.3         nRBC 0         Ovalocytes Occasional         Platelet Estimate Appears Normal         Platelet Count 195         Poly Occasional         Potassium 3.9         PROTEIN TOTAL 7.2         RBC 3.29         RDW 28.1         Sodium 137         Target  Cells Occasional         Troponin I 0.031  Comment: The reference interval for Troponin I represents the 99th percentile cutoff for our facility and is consistent with 3rd generation assay performance.         WBC 6.07                 Significant Imaging: I have reviewed all pertinent imaging results/findings within the past 24 hours.

## 2025-07-09 NOTE — PROGRESS NOTES
Heart Failure [facility] (H[facility]) Team notified of pt referral via [facility] (automated inbasket notification) .    Patient screened today by provider and HF nurse for enrollment to program.      Pt was deemed not a candidate for enrollment at this time related to [facility] in  [facility].Pt sees Dr. Johnson in [place].    Pt will require additional follow up planning per primary team.

## 2025-07-09 NOTE — PLAN OF CARE
Problem: Skin Injury Risk Increased  Goal: Skin Health and Integrity  Outcome: Progressing     Problem: Adult Inpatient Plan of Care  Goal: Plan of Care Review  Outcome: Progressing  Goal: Patient-Specific Goal (Individualized)  Outcome: Progressing  Goal: Absence of Hospital-Acquired Illness or Injury  Outcome: Progressing  Goal: Optimal Comfort and Wellbeing  Outcome: Progressing  Goal: Readiness for Transition of Care  Outcome: Progressing     Problem: ARDS (Acute Respiratory Distress Syndrome)  Goal: Effective Oxygenation  Outcome: Progressing

## 2025-07-09 NOTE — ASSESSMENT & PLAN NOTE
Patient has Combined Systolic and Diastolic heart failure that is Acute on chronic. On presentation their CHF was decompensated. Evidence of decompensated CHF on presentation includes: edema, paroxysmal nocturnal dyspnea (PND), dyspnea on exertion ([place]), and shortness of breath. The etiology of their decompensation is likely dietary indiscretion and increased fluid intake. Most recent BNP and echo results are listed below.  Recent Labs     07/08/25  1651   BNP 1,837*     Latest ECHO  Results for orders placed during the hospital encounter of 05/23/25    Echo Saline Bubble? No    Interpretation Summary    Left Ventricle: The left ventricle is moderately dilated. LVEDD 6.6 cm. There is severely reduced systolic function with a visually estimated ejection fraction of 20 - 25%. There is diastolic dysfunction.    Right Ventricle: Systolic function is mildly reduced.    Mitral Valve: There is moderate (2+) regurgitation.    Tricuspid Valve: There is mild to moderate (1-2+)regurgitation.    Pulmonary Artery: The estimated pulmonary artery systolic pressure is 53 mmHg.    Current Heart Failure Medications  furosemide injection 80 mg, Every 12 hours, Intravenous    Plan  - Monitor strict I&Os and daily weights.    - Place on telemetry  - Low sodium diet  - Place on fluid restriction of 1.5 L.   - Cardiology has not been consulted  - The patient's volume status is being monitored  - IV lasix

## 2025-07-09 NOTE — PLAN OF CARE
[facility] - Telemetry (Hospital)  Initial Discharge Assessment       Primary Care Provider: Abiodun Recinos DO    Admission Diagnosis: Shortness of breath [R06.02]  Tachypnea [R06.82]  Acute pulmonary edema [J81.0]  Troponin level elevated [R79.89]  Longstanding persistent atrial fibrillation [I48.11]  Acute congestive heart failure, unspecified heart failure type [I50.9]    Admission Date: 7/8/2025  Expected Discharge Date: Per Attending    Transition of Care Barriers: None    Payor: MEDICARE / Plan: MEDICARE PART A & B / Product Type: Government /     Extended Emergency Contact Information  Primary Emergency Contact: Yesenia Reyes  Mobile Phone: [phone]  Relation: Sister  Secondary Emergency Contact: Nina trevino  Mobile Phone: [phone]  Relation: Sister   needed? No    Discharge Plan A: Skilled Nursing Facility         [place] [id number] [facility]  [address] [address]  Phone: [phone] Fax: [phone]    [id number] at [address] - [address] [facility] [address]  [address] [facility] [address]  Phone: [phone] Fax: [phone]      Initial Assessment (most recent)       Adult Discharge Assessment - 07/09/25 0919          Discharge Assessment    Assessment Type Discharge Planning Assessment     Confirmed/corrected address, phone number and insurance Yes     Confirmed Demographics Correct on Facesheet     Source of Information patient     Communicated ZEINAB with patient/caregiver Date not available/Unable to determine     People in Home alone     Facility Arrived From: [facility]     Do you expect to return to your current living situation? Yes     Do you have help at home or someone to help you manage your care at home? Yes     Who are your caregiver(s) and their phone number(s)? Returning to [facility] for completion of Therapy, upon returning home - sisters will be help     Prior to  hospitilization cognitive status: Alert/Oriented     Current cognitive status: Alert/Oriented     Walking or Climbing Stairs Difficulty no     Dressing/Bathing Difficulty no     Equipment Currently Used at Home oxygen     Readmission within 30 days? No     Patient currently being followed by outpatient case management? No     Do you currently have service(s) that help you manage your care at home? No     Do you take prescription medications? Yes     Do you have prescription coverage? Yes     Do you have any problems affording any of your prescribed medications? No     Is the patient taking medications as prescribed? yes     Who is going to help you get home at discharge? [facility] staff  and then upon returning home - sisters will be help     How do you get to doctors appointments? family or friend will provide     Are you on dialysis? No     Do you take coumadin? No     Discharge Plan A Skilled Nursing Facility     DME Needed Upon Discharge  none     Discharge Plan discussed with: Patient     Transition of Care Barriers None                   Anticipated DC dispo: Return to [facility]   Prior Level of Function: modified independence with ADLS  People in home: [facility] for SNF and alone at home    Comments:  SW met with patient at bedside to introduce role and discuss discharge planning. Patient is from [facility] for SNF placement and will return upon discharge. Per Elena, with [facility], Patient does not need 3 IP Midnights to return, but will need therapy evaluations. Patient states his sisters are helping come up with a DC plan following completion of treatment at [facility] and  will be help upon returning home. Confirmed demographics, insurance, and emergency contacts. SW updated Patient's whiteboard with contact information and anticipated DC plan. CM discharge needs depends on hospital progress. SW will continue following to assist with other needs.

## 2025-07-09 NOTE — ASSESSMENT & PLAN NOTE
Patient has Combined Systolic and Diastolic heart failure that is Acute on chronic. On presentation their CHF was decompensated. Evidence of decompensated CHF on presentation includes: edema, paroxysmal nocturnal dyspnea (PND), dyspnea on exertion ([place]), and shortness of breath. The etiology of their decompensation is likely dietary indiscretion and increased fluid intake. Most recent BNP and echo results are listed below.  Recent Labs     07/08/25  1651   BNP 1,837*     Latest ECHO  Results for orders placed during the hospital encounter of 05/23/25    Echo Saline Bubble? No    Interpretation Summary    Left Ventricle: The left ventricle is moderately dilated. LVEDD 6.6 cm. There is severely reduced systolic function with a visually estimated ejection fraction of 20 - 25%. There is diastolic dysfunction.    Right Ventricle: Systolic function is mildly reduced.    Mitral Valve: There is moderate (2+) regurgitation.    Tricuspid Valve: There is mild to moderate (1-2+)regurgitation.    Pulmonary Artery: The estimated pulmonary artery systolic pressure is 53 mmHg.    Current Heart Failure Medications  furosemide injection 80 mg, Every 12 hours, Intravenous  metOLazone tablet 5 mg, Daily, Oral    Plan  - Monitor strict I&Os and daily weights.    - Place on telemetry  - Low sodium diet  - Place on fluid restriction of 1.5 L.   - Cardiology has not been consulted  - The patient's volume status is being monitored  - IV lasix

## 2025-07-09 NOTE — PLAN OF CARE
EVAL AND TX COMPLETED: facilitated transfers with CGA. Ambulated 5 ft CGA with RW. Recommend continued PT services at mod intensity.

## 2025-07-09 NOTE — ASSESSMENT & PLAN NOTE
Patient has permanent atrial fibrillation. Patient is currently in atrial fibrillation. [id number] Score: 2. The patients heart rate in the last 24 hours is as follows:  Pulse  Min: 48  Max: 78     Antiarrhythmics  amiodarone tablet 400 mg, Daily, Oral    Anticoagulants  rivaroxaban tablet 20 mg, With dinner, Oral    Plan  - Replete lytes with a goal of K>4, Mg >2  - Patient is anticoagulated, see medications listed above.  - Patient's afib is currently controlled  -

## 2025-07-09 NOTE — ASSESSMENT & PLAN NOTE
Patient's FSGs are controlled on current medication regimen.  Last A1c reviewed-   Lab Results   Component Value Date    HGBA1C 6.0 (H) 07/08/2025     Most recent fingerstick glucose reviewed-   Recent Labs   Lab 07/08/25  2148 07/09/25  0537 07/09/25  1622   POCTGLUCOSE 94 86 131*     Current correctional scale  Medium  Maintain anti-hyperglycemic dose as follows-   Antihyperglycemics (From admission, onward)      Start     Stop Route Frequency Ordered    07/08/25 2023  insulin aspart U-100 pen 0-10 Units         -- SubQ Before meals & nightly PRN 07/08/25 1923          Hold Oral hypoglycemics while patient is in the hospital.

## 2025-07-09 NOTE — CONSULTS
Food & Nutrition Education    Diet Education: Cardiac, Fluid restriction diet    Learners: Patient    Nutrition Education provided with handouts:  Heart Healthy Nutrition Therapy  Fluid Restricted Nutrition Therapy  (nutritioncaremanual.org)    Comments:  PMH: Chronic A Fib, HLD  Hx: COPD    69 y.o. Male admitted for CHF (congestive heart failure). Pt is currently on isolation and a Low sodium 2 gm, 1500 mL fluid restriction diet. RD attempted to talk to pt via phone, no answer. Provided RN with a menu to help encourage pt preferred food choices and diet compliance. 100% PO intake charted. Current weight charted 7/9/25 228 lbs (BMI 38.04, Obese).     RD provided pt with nutrition education handouts on low sodium, general healthful diet r/t recent hospital diagnosis and fluid restriction.     Handouts recommend a well balanced diet with a variety of fresh foods, fruits and vegetables (5 cups/day), whole grains (3 oz/day), and fat-free or low fat dairy; salt free seasonings and other herbs and spices in meals to enhance flavor without additional sodium; to aim for total fat less than 25-35% of calories; and using a cup with measurements for fluids and to try to consume small sips spread throughout the day rather than a lot at one time.      Handouts discuss importance of reading food packages, food labels, and nutrition facts labels to identify nutrient content of food; the importance of limiting sodium to less than 2,000 mg per day; dietary sources of cholesterol and the importance of incorporating healthy fats into the diet, and avoiding saturated and trans fats for heart health; the importance of fiber (especially soluble fiber), dietary sources, and a goal intake of >20-30g/day; and 1500 mL fluid restriction per MD and dietary sources of fluid.    RD provided nutrition education handouts to RN, encouraged pt to read handouts and use RD contact information with any further questions/concerns that he may have,  will discuss at follow up. Note additional nutrition education also attached to pt's d/c papers.     Nutrition Related Social Determinants of Health: SDOH: Unable to assess at this time.     MST score = 0, performed 7/8/25 on day of admit.     Provided handout with dietitian's contact information.  *Please re-consult as needed.  Thank you,  Cristy Bwoman, BS, RDN, LDN

## 2025-07-09 NOTE — PROGRESS NOTES
[facility] Telemetry [facility] Medicine  Progress Note    Patient Name: Ran Reyes Jr.  MRN: [id number]  Patient Class: IP- Inpatient   Admission Date: 7/8/2025  Length of Stay: 0 days  Attending Physician: Savanna Gomez MD  Primary Care Provider: Abiodun Recinos DO        Subjective     Principal Problem:CHF (congestive heart failure)        HPI:  The patient is a 70 yo male with past medical history of CHF, afib, depression and glaucoma who presented to the ED with shortness of breath and BLE edema. He reports started a week ago and has worsened. He was unable to lay flat. He has not slept well the last couple of nights. Workup in the ED revealed elevated BNP of 1837. He was given IV lasix in the ED. Hospital medicine was consulted. Patient placed in observation.     Overview/Hospital Course:  No notes on file    Interval History: f/u CHF patient with significant edema added metolazone    Review of Systems  Objective:     Vital Signs (Most Recent):  Temp: 98.6 °F (37 °C) (07/09/25 1610)  Pulse: 64 (07/09/25 1610)  Resp: 18 (07/09/25 1610)  BP: 110/68 (07/09/25 1612)  SpO2: 100 % (07/09/25 1610) Vital Signs (24h Range):  Temp:  [97.5 °F (36.4 °C)-98.6 °F (37 °C)] 98.6 °F (37 °C)  Pulse:  [48-78] 64  Resp:  [16-18] 18  SpO2:  [97 %-100 %] 100 %  BP: (101-130)/(64-88) 110/68     Weight: 103.7 kg (228 lb 9.9 oz)  Body mass index is 38.04 kg/m².    Intake/Output Summary (Last 24 hours) at 7/9/2025 1849  Last data filed at 7/9/2025 1829  Gross per 24 hour   Intake 1390 ml   Output 5750 ml   Net -4360 ml         Physical Exam  HENT:      Head: Normocephalic and atraumatic.   Cardiovascular:      Rate and Rhythm: Normal rate and regular rhythm.      Heart sounds: No murmur heard.  Pulmonary:      Effort: Pulmonary effort is normal. No respiratory distress.      Breath sounds: Normal breath sounds. No wheezing.   Abdominal:      General: Bowel sounds are normal. There is no distension.      Palpations:  Abdomen is soft.      Tenderness: There is no abdominal tenderness.   Musculoskeletal:         General: Swelling present.      Right lower leg: Edema present.      Left lower leg: Edema present.   Skin:     General: Skin is warm and dry.   Neurological:      Mental Status: He is alert and oriented to person, place, and time. Mental status is at baseline.               Significant Labs: All pertinent labs within the past 24 hours have been reviewed.  Recent Lab Results  (Last 5 results in the past 24 hours)        07/09/25  1622   07/09/25  0537   07/09/25  0453   07/08/25  2148   07/08/25  1917        Anion Gap     11           Baso #     0.03           Basophil %     0.6           BUN     12           Calcium     8.9           Chloride     100           CO2     27           Creatinine     0.9           eGFR     >60  Comment: Estimated GFR calculated using the CKD-EPI creatinine (2021) equation.           Eos #     0.07           Eos %     1.3           Estimated Avg Glucose         126       Glucose     84           Gran # (ANC)     3.23           Hematocrit     26.8           Hemoglobin     7.8           Hemoglobin A1C External         6.0  Comment: ADA Screening Guidelines:  5.7-6.4%  Consistent with prediabetes  >=6.5%  Consistent with diabetes    High levels of fetal hemoglobin interfere with the HbA1C  assay. Heterozygous hemoglobin variants (HbS, HgC, etc)do  not significantly interfere with this assay.   However, presence of multiple variants may affect accuracy.       Immature Grans (Abs)     0.02  Comment: Mild elevation in immature granulocytes is non specific and can be seen in a variety of conditions including stress response, acute inflammation, trauma and pregnancy. Correlation with other laboratory and clinical findings is essential.           Immature Granulocytes     0.4           Lymph #     1.17           Lymph %     22.5           MCH     26.3           MCHC     29.1           MCV     90            Mono #     0.68           Mono %     13.1           MPV     10.3           Neut %     62.1           nRBC     0           Platelet Count     183           POCT Glucose 131   86     94         Potassium     3.4           RBC     2.97           RDW     27.7           Sodium     138           WBC     5.20                                  Significant Imaging: I have reviewed all pertinent imaging results/findings within the past 24 hours.      Assessment & Plan  CHF (congestive heart failure)  Patient has Combined Systolic and Diastolic heart failure that is Acute on chronic. On presentation their CHF was decompensated. Evidence of decompensated CHF on presentation includes: edema, paroxysmal nocturnal dyspnea (PND), dyspnea on exertion ([place]), and shortness of breath. The etiology of their decompensation is likely dietary indiscretion and increased fluid intake. Most recent BNP and echo results are listed below.  Recent Labs     07/08/25  1651   BNP 1,837*     Latest ECHO  Results for orders placed during the hospital encounter of 05/23/25    Echo Saline Bubble? No    Interpretation Summary    Left Ventricle: The left ventricle is moderately dilated. LVEDD 6.6 cm. There is severely reduced systolic function with a visually estimated ejection fraction of 20 - 25%. There is diastolic dysfunction.    Right Ventricle: Systolic function is mildly reduced.    Mitral Valve: There is moderate (2+) regurgitation.    Tricuspid Valve: There is mild to moderate (1-2+)regurgitation.    Pulmonary Artery: The estimated pulmonary artery systolic pressure is 53 mmHg.    Current Heart Failure Medications  furosemide injection 80 mg, Every 12 hours, Intravenous  metOLazone tablet 5 mg, Daily, Oral    Plan  - Monitor strict I&Os and daily weights.    - Place on telemetry  - Low sodium diet  - Place on fluid restriction of 1.5 L.   - Cardiology has not been consulted  - The patient's volume status is being monitored  - IV  lasix          Chronic atrial fibrillation  Patient has permanent atrial fibrillation. Patient is currently in atrial fibrillation. [id number] Score: 2. The patients heart rate in the last 24 hours is as follows:  Pulse  Min: 48  Max: 78     Antiarrhythmics  amiodarone tablet 400 mg, Daily, Oral    Anticoagulants  rivaroxaban tablet 20 mg, With dinner, Oral    Plan  - Replete lytes with a goal of K>4, Mg >2  - Patient is anticoagulated, see medications listed above.  - Patient's afib is currently controlled  -       Type 2 diabetes mellitus, without long-term current use of insulin  Patient's FSGs are controlled on current medication regimen.  Last A1c reviewed-   Lab Results   Component Value Date    HGBA1C 6.0 (H) 07/08/2025     Most recent fingerstick glucose reviewed-   Recent Labs   Lab 07/08/25  2148 07/09/25  0537 07/09/25  1622   POCTGLUCOSE 94 86 131*     Current correctional scale  Medium  Maintain anti-hyperglycemic dose as follows-   Antihyperglycemics (From admission, onward)      Start     Stop Route Frequency Ordered    07/08/25 2023  insulin aspart U-100 pen 0-10 Units         -- SubQ Before meals & nightly PRN 07/08/25 1923          Hold Oral hypoglycemics while patient is in the hospital.      VTE Risk Mitigation (From admission, onward)           Ordered     rivaroxaban tablet 20 mg  With dinner         07/08/25 1920     IP VTE HIGH RISK PATIENT  Once         07/08/25 1901     Place sequential compression device  Until discontinued         07/08/25 1901                    Discharge Planning   ZEINAB:      Code Status: Full Code   Medical Readiness for Discharge Date:   Discharge Plan A: Skilled Nursing Facility                  Please place Justification for DME      Savanna Gomez MD  Department of Hospital Medicine   [facility] - Telemetry ([facility])

## 2025-07-10 LAB
ANION GAP (OHS): 15 MMOL/L (ref 8–16)
BUN SERPL-MCNC: 14 MG/DL (ref 8–23)
CALCIUM SERPL-MCNC: 8.9 MG/DL (ref 8.7–10.5)
CHLORIDE SERPL-SCNC: 94 MMOL/L (ref 95–110)
CO2 SERPL-SCNC: 26 MMOL/L (ref 23–29)
CREAT SERPL-MCNC: 1 MG/DL (ref 0.5–1.4)
GFR SERPLBLD CREATININE-BSD FMLA CKD-EPI: >60 ML/MIN/1.73/M2
GLUCOSE SERPL-MCNC: 105 MG/DL (ref 70–110)
POCT GLUCOSE: 116 MG/DL (ref 70–110)
POCT GLUCOSE: 126 MG/DL (ref 70–110)
POCT GLUCOSE: 131 MG/DL (ref 70–110)
POCT GLUCOSE: 137 MG/DL (ref 70–110)
POTASSIUM SERPL-SCNC: 2.9 MMOL/L (ref 3.5–5.1)
SODIUM SERPL-SCNC: 135 MMOL/L (ref 136–145)

## 2025-07-10 PROCEDURE — 36415 COLL VENOUS BLD VENIPUNCTURE: CPT | Performed by: INTERNAL MEDICINE

## 2025-07-10 PROCEDURE — 27000221 HC OXYGEN, UP TO 24 HOURS

## 2025-07-10 PROCEDURE — 21400001 HC TELEMETRY ROOM

## 2025-07-10 PROCEDURE — 80048 BASIC METABOLIC PNL TOTAL CA: CPT | Performed by: INTERNAL MEDICINE

## 2025-07-10 PROCEDURE — 25000003 PHARM REV CODE 250: Performed by: INTERNAL MEDICINE

## 2025-07-10 PROCEDURE — 94761 N-INVAS EAR/PLS OXIMETRY MLT: CPT

## 2025-07-10 PROCEDURE — 63600175 PHARM REV CODE 636 W HCPCS: Performed by: INTERNAL MEDICINE

## 2025-07-10 PROCEDURE — 99900035 HC TECH TIME PER 15 MIN (STAT)

## 2025-07-10 RX ADMIN — METOLAZONE 5 MG: 5 TABLET ORAL at 09:07

## 2025-07-10 RX ADMIN — AMIODARONE HYDROCHLORIDE 400 MG: 200 TABLET ORAL at 09:07

## 2025-07-10 RX ADMIN — HYDROCORTISONE 20 MG: 10 TABLET ORAL at 09:07

## 2025-07-10 RX ADMIN — PANTOPRAZOLE SODIUM 40 MG: 40 TABLET, DELAYED RELEASE ORAL at 09:07

## 2025-07-10 RX ADMIN — FUROSEMIDE 80 MG: 10 INJECTION, SOLUTION INTRAVENOUS at 10:07

## 2025-07-10 RX ADMIN — THIAMINE HCL TAB 100 MG 100 MG: 100 TAB at 09:07

## 2025-07-10 RX ADMIN — HYDROCORTISONE 10 MG: 10 TABLET ORAL at 09:07

## 2025-07-10 RX ADMIN — FUROSEMIDE 80 MG: 10 INJECTION, SOLUTION INTRAVENOUS at 09:07

## 2025-07-10 RX ADMIN — RIVAROXABAN 20 MG: 20 TABLET, FILM COATED ORAL at 04:07

## 2025-07-10 RX ADMIN — ATORVASTATIN CALCIUM 40 MG: 40 TABLET, FILM COATED ORAL at 09:07

## 2025-07-10 RX ADMIN — SERTRALINE HYDROCHLORIDE 50 MG: 50 TABLET ORAL at 09:07

## 2025-07-10 NOTE — PLAN OF CARE
Problem: Adult Inpatient Plan of Care  Goal: Plan of Care Review  Outcome: Progressing     Problem: Adult Inpatient Plan of Care  Goal: Absence of Hospital-Acquired Illness or Injury  Outcome: Progressing     Problem: Diabetes Comorbidity  Goal: Blood Glucose Level Within Targeted Range  Outcome: Progressing     Problem: Fall Injury Risk  Goal: Absence of Fall and Fall-Related Injury  Outcome: Progressing

## 2025-07-10 NOTE — PT/OT/SLP PROGRESS
"Physical Therapy      Patient Name:  Ran Reyes Jr.   MRN:  [id number]    Patient not seen today secondary to Patient unwilling to participate.     09:15 a.m.  Patient reporting 6/10 B foot pain and awaiting breakfast tray. (Nurse notified patient had not received tray)    12:00 p.m.  Patient had just received lunch tray and was adamant he wanted to eat.  "I can't do that right now."    Will follow up at next available opportunity.     "

## 2025-07-10 NOTE — PT/OT/SLP PROGRESS
"Occupational Therapy      Patient Name:  Ran Reyes Jr.   MRN:  [id number]    Patient not seen today secondary to  (first attmept at 0915, pt reports he has not eaten breakfast although sitting upright in chair. pt also reports 6/10 pain in B feet. 1200 attempted, pt refusing 2' to lunch tray and reports "what about this oxygen in my nose, i cant walk"). Will follow-up at next available time.  ANGIE Mathis  0915  1200  7/10/2025  "

## 2025-07-10 NOTE — PLAN OF CARE
POC reviewed with pt. Pt verbalizes understanding of POC. No questions at this time.  AAOx3. NADN.  A-fib/NS on cardiac monitor. On heart transplant list in [place].  Patient diuresing. Within fluid restriction.  Pt remains free of falls.  No complaints at this time.  Safety measures in place. Will continue to monitor.  Informed pt to call for assistance before getting up. Pt verbalizes understanding.  Hourly rounding and chart check complete.

## 2025-07-10 NOTE — PROGRESS NOTES
[facility] Telemetry [facility] Medicine  Progress Note    Patient Name: Ran Reyes Jr.  MRN: [id number]  Patient Class: IP- Inpatient   Admission Date: 7/8/2025  Length of Stay: 1 days  Attending Physician: Savanna Gomez MD  Primary Care Provider: Abiodun Recinos DO Subjective     Principal Problem:CHF (congestive heart failure)        HPI:  The patient is a 68 yo male with past medical history of CHF, afib, depression and glaucoma who presented to the ED with shortness of breath and BLE edema. He reports started a week ago and has worsened. He was unable to lay flat. He has not slept well the last couple of nights. Workup in the ED revealed elevated BNP of 1837. He was given IV lasix in the ED. Hospital medicine was consulted. Patient placed in observation.     Overview/Hospital Course:  No notes on file    Interval History: f/u CHF continue IV diuresis patient continues with pitting edema    Review of Systems  Objective:     Vital Signs (Most Recent):  Temp: 97.9 °F (36.6 °C) (07/10/25 1212)  Pulse: 98 (07/10/25 1212)  Resp: 19 (07/10/25 1212)  BP: 96/64 (07/10/25 1212)  SpO2: (!) 92 % (07/10/25 1212) Vital Signs (24h Range):  Temp:  [97.8 °F (36.6 °C)-98.6 °F (37 °C)] 97.9 °F (36.6 °C)  Pulse:  [56-98] 98  Resp:  [17-20] 19  SpO2:  [92 %-100 %] 92 %  BP: ()/(58-76) 96/64     Weight: 97 kg (213 lb 13.5 oz)  Body mass index is 35.59 kg/m².    Intake/Output Summary (Last 24 hours) at 7/10/2025 1455  Last data filed at 7/10/2025 0630  Gross per 24 hour   Intake 640 ml   Output 4100 ml   Net -3460 ml         Physical Exam  HENT:      Head: Normocephalic and atraumatic.   Cardiovascular:      Rate and Rhythm: Normal rate and regular rhythm.      Heart sounds: No murmur heard.  Pulmonary:      Effort: Pulmonary effort is normal. No respiratory distress.      Breath sounds: Normal breath sounds. No wheezing.   Abdominal:      General: Bowel sounds are normal. There is no distension.       Palpations: Abdomen is soft.      Tenderness: There is no abdominal tenderness.   Musculoskeletal:         General: Swelling present.      Right lower leg: Edema present.      Left lower leg: Edema present.   Skin:     General: Skin is warm and dry.   Neurological:      Mental Status: He is alert and oriented to person, place, and time. Mental status is at baseline.               Significant Labs: All pertinent labs within the past 24 hours have been reviewed.  Recent Lab Results  (Last 5 results in the past 24 hours)        07/10/25  1128   07/10/25  0524   07/10/25  0457   07/09/25 2029 07/09/25  1622        Anion Gap     15           BUN     14           Calcium     8.9           Chloride     94           CO2     26           Creatinine     1.0           eGFR     >60  Comment: Estimated GFR calculated using the CKD-EPI creatinine (2021) equation.           Glucose     105           POCT Glucose 126   116     172   131       Potassium     2.9           Sodium     135                                  Significant Imaging: I have reviewed all pertinent imaging results/findings within the past 24 hours.      Assessment & Plan  CHF (congestive heart failure)  Patient has Combined Systolic and Diastolic heart failure that is Acute on chronic. On presentation their CHF was decompensated. Evidence of decompensated CHF on presentation includes: edema, paroxysmal nocturnal dyspnea (PND), dyspnea on exertion ([place]), and shortness of breath. The etiology of their decompensation is likely dietary indiscretion and increased fluid intake. Most recent BNP and echo results are listed below.  Recent Labs     07/08/25  1651   BNP 1,837*     Latest ECHO  Results for orders placed during the hospital encounter of 05/23/25    Echo Saline Bubble? No    Interpretation Summary    Left Ventricle: The left ventricle is moderately dilated. LVEDD 6.6 cm. There is severely reduced systolic function with a visually estimated ejection fraction  of 20 - 25%. There is diastolic dysfunction.    Right Ventricle: Systolic function is mildly reduced.    Mitral Valve: There is moderate (2+) regurgitation.    Tricuspid Valve: There is mild to moderate (1-2+)regurgitation.    Pulmonary Artery: The estimated pulmonary artery systolic pressure is 53 mmHg.    Current Heart Failure Medications  furosemide injection 80 mg, Every 12 hours, Intravenous  metOLazone tablet 5 mg, Daily, Oral    Plan  - Monitor strict I&Os and daily weights.    - Place on telemetry  - Low sodium diet  - Place on fluid restriction of 1.5 L.   - Cardiology has not been consulted  - The patient's volume status is being monitored  - IV lasix          Chronic atrial fibrillation  Patient has permanent atrial fibrillation. Patient is currently in atrial fibrillation. [id number] Score: 3. The patients heart rate in the last 24 hours is as follows:  Pulse  Min: 56  Max: 98     Antiarrhythmics  amiodarone tablet 400 mg, Daily, Oral    Anticoagulants  rivaroxaban tablet 20 mg, With dinner, Oral    Plan  - Replete lytes with a goal of K>4, Mg >2  - Patient is anticoagulated, see medications listed above.  - Patient's afib is currently controlled  -       Type 2 diabetes mellitus, without long-term current use of insulin  Patient's FSGs are controlled on current medication regimen.  Last A1c reviewed-   Lab Results   Component Value Date    HGBA1C 6.0 (H) 07/08/2025     Most recent fingerstick glucose reviewed-   Recent Labs   Lab 07/09/25  1622 07/09/25  2029 07/10/25  0524 07/10/25  1128   POCTGLUCOSE 131* 172* 116* 126*     Current correctional scale  Medium  Maintain anti-hyperglycemic dose as follows-   Antihyperglycemics (From admission, onward)      Start     Stop Route Frequency Ordered    07/08/25 2023  insulin aspart U-100 pen 0-10 Units         -- SubQ Before meals & nightly PRN 07/08/25 1923          Hold Oral hypoglycemics while patient is in the hospital.      VTE Risk Mitigation (From  admission, onward)           Ordered     rivaroxaban tablet 20 mg  With dinner         07/08/25 1920     IP VTE HIGH RISK PATIENT  Once         07/08/25 1901     Place sequential compression device  Until discontinued         07/08/25 1901                    Discharge Planning   ZEINAB:      Code Status: Full Code   Medical Readiness for Discharge Date:   Discharge Plan A: Skilled Nursing Facility                  Please place Justification for DME      Savanna Gomez MD  Department of Hospital Medicine   [facility] ([facility])

## 2025-07-10 NOTE — ASSESSMENT & PLAN NOTE
Patient has permanent atrial fibrillation. Patient is currently in atrial fibrillation. [id number] Score: 3. The patients heart rate in the last 24 hours is as follows:  Pulse  Min: 56  Max: 98     Antiarrhythmics  amiodarone tablet 400 mg, Daily, Oral    Anticoagulants  rivaroxaban tablet 20 mg, With dinner, Oral    Plan  - Replete lytes with a goal of K>4, Mg >2  - Patient is anticoagulated, see medications listed above.  - Patient's afib is currently controlled  -

## 2025-07-10 NOTE — SUBJECTIVE & OBJECTIVE
Interval History: f/u CHF continue IV diuresis patient continues with pitting edema    Review of Systems  Objective:     Vital Signs (Most Recent):  Temp: 97.9 °F (36.6 °C) (07/10/25 1212)  Pulse: 98 (07/10/25 1212)  Resp: 19 (07/10/25 1212)  BP: 96/64 (07/10/25 1212)  SpO2: (!) 92 % (07/10/25 1212) Vital Signs (24h Range):  Temp:  [97.8 °F (36.6 °C)-98.6 °F (37 °C)] 97.9 °F (36.6 °C)  Pulse:  [56-98] 98  Resp:  [17-20] 19  SpO2:  [92 %-100 %] 92 %  BP: ()/(58-76) 96/64     Weight: 97 kg (213 lb 13.5 oz)  Body mass index is 35.59 kg/m².    Intake/Output Summary (Last 24 hours) at 7/10/2025 1459  Last data filed at 7/10/2025 0630  Gross per 24 hour   Intake 640 ml   Output 4100 ml   Net -3460 ml         Physical Exam  HENT:      Head: Normocephalic and atraumatic.   Cardiovascular:      Rate and Rhythm: Normal rate and regular rhythm.      Heart sounds: No murmur heard.  Pulmonary:      Effort: Pulmonary effort is normal. No respiratory distress.      Breath sounds: Normal breath sounds. No wheezing.   Abdominal:      General: Bowel sounds are normal. There is no distension.      Palpations: Abdomen is soft.      Tenderness: There is no abdominal tenderness.   Musculoskeletal:         General: Swelling present.      Right lower leg: Edema present.      Left lower leg: Edema present.   Skin:     General: Skin is warm and dry.   Neurological:      Mental Status: He is alert and oriented to person, place, and time. Mental status is at baseline.               Significant Labs: All pertinent labs within the past 24 hours have been reviewed.  Recent Lab Results  (Last 5 results in the past 24 hours)        07/10/25  1128   07/10/25  0524   07/10/25  0457   07/09/25 2029 07/09/25  1622        Anion Gap     15           BUN     14           Calcium     8.9           Chloride     94           CO2     26           Creatinine     1.0           eGFR     >60  Comment: Estimated GFR calculated using the CKD-EPI creatinine  (2021) equation.           Glucose     105           POCT Glucose 126   116     172   131       Potassium     2.9           Sodium     135                                  Significant Imaging: I have reviewed all pertinent imaging results/findings within the past 24 hours.

## 2025-07-10 NOTE — ASSESSMENT & PLAN NOTE
Patient has Combined Systolic and Diastolic heart failure that is Acute on chronic. On presentation their CHF was decompensated. Evidence of decompensated CHF on presentation includes: edema, paroxysmal nocturnal dyspnea (PND), dyspnea on exertion (MONTANA), and shortness of breath. The etiology of their decompensation is likely dietary indiscretion and increased fluid intake. Most recent BNP and echo results are listed below.  Recent Labs     07/08/25  1651   BNP 1,837*     Latest ECHO  Results for orders placed during the hospital encounter of 05/23/25    Echo Saline Bubble? No    Interpretation Summary    Left Ventricle: The left ventricle is moderately dilated. LVEDD 6.6 cm. There is severely reduced systolic function with a visually estimated ejection fraction of 20 - 25%. There is diastolic dysfunction.    Right Ventricle: Systolic function is mildly reduced.    Mitral Valve: There is moderate (2+) regurgitation.    Tricuspid Valve: There is mild to moderate (1-2+)regurgitation.    Pulmonary Artery: The estimated pulmonary artery systolic pressure is 53 mmHg.    Current Heart Failure Medications  furosemide injection 80 mg, Every 12 hours, Intravenous  metOLazone tablet 5 mg, Daily, Oral    Plan  - Monitor strict I&Os and daily weights.    - Place on telemetry  - Low sodium diet  - Place on fluid restriction of 1.5 L.   - Cardiology has not been consulted  - The patient's volume status is being monitored  - IV lasix           Physical Therapy (PT) Daily Treatment Note     Name: Mehreen Benites  Clinic Number: [id number]    Therapy Diagnosis:   Encounter Diagnoses   Name Primary?    Decreased range of motion of right ankle Yes    Abnormality of gait and mobility      Physician: Axel Kapoor Jr., *    Visit Date: 11/7/2023    Physician Orders: Eval and Treat: - wb: nwb RLE in CAM x 2wks - passive ROM only, then wbat in CAM x 2 wks add active ROM, eval and treat after that  Medical Diagnosis: Z47.89 (ICD-10-CM) - Aftercare following surgery of the musculoskeletal system; M76.61 (ICD-10-CM) - Achilles tendinitis of right lower extremity  Surgical Procedure and Date: 8/25/2023: REPAIR, TENDON, ACHILLES (Right); RESECTION, MUSCLE, GASTROCNEMIUS (Right); EXCISION, EXOSTOSIS, RETROCALCANEAL (Right); ASPIRATION, BONE MARROW (Right)   Evaluation Date: 10/9/2023  Insurance Authorization Period Expiration: 12/31/2023  Plan of Care Certification Period: 12/04/2023  Date of Return to MD: 10/23/2023   Visit # / Visits authorized: 7 / 20    FOTO  -Intake: 54%   -Status: Incomplete  -Discharge: Incomplete    Time In: 7:45 AM   Time Out: 8:40 AM  Total Billable Time: 55 minutes     Precautions: Standard    Subjective     Patient reports: no new issues or concerns at this time.  She was compliant with home exercise program.  Response to previous treatment: initial evaluation   Functional change: ongoing assessment     Pain: 5/10  Location: right ankles     Objective     Mehreen received therapeutic exercises to develop strength, endurance, ROM, and flexibility for 60 minutes including:    Date  11/7/2023 11/03/2023 10/31/2023    Visit  7/20 6/20 5/20   Therapeutic Exercise      Nustep / Recumbent bike  10'  10' L3  10' L3    Standing gastroc stretch on wedge  30 sec x 3 times     Piriformis stretch       SLR  3 x 10 2# AW ea  3 x 10 2# AW each leg  3 x 10 2# right only    Bridging  3 x 10 GTB  3 X 10 GTB 3 X 10 GTB   SL clams  3 x 10 GTB ea  3 X 10  EA GTB 3 X 10 EA GTB   SL hip abduction  3 x 10 2# AW each leg  3 x 10 2# AW each leg   x 10 2# each    Ankle circles/ABCs   Discharged    Toe yoga      Dome raises    30x    MOBO (odd/even taps)  3' each with blue resistance  3' each with blue resistance 30x each with blue resistance   Seated calf raises   3x10 (barefoot)  3x10 (barefoot)  30x (barefoot)    Seated ankle DF  3x10 (barefoot)  3x10 (barefoot)  3x10 (barefoot)    Ankle AROM inversion/eversion    3x10 each    Shuttle squats DBL/SGL  3.5 bands 3x15 / 2 bands 3x10  3 bands/2 bands 3x10 each   Standing hip abduction  3 x 10 each YTB 3 x 10 each YTB    Manual Therapy  No  No        Home Exercise Program (HEP) Provided and Patient Education Provided     Patient was provided education on 10/09/2023.     Written Home Exercises Provided: Patient instructed to cont prior home program.  Exercises were reviewed and Mehreen was able to demonstrate them prior to the end of the session.  Mehreen demonstrated good  understanding of the education provided.     Assessment     Pt with no reports of pain post session.  Pt continues to do well with her current PT POC at this time.  Will continue to monitor and progress pt as tolerated.     Mehreen Is progressing well towards her goals.     Patient will continue to benefit from skilled outpatient physical therapy to address the deficits listed in the problem list box on initial evaluation, provide patient/family education and to maximize patient's level of independence in the home and community environment.     Patient prognosis is Good.     Patient's spiritual, cultural and educational needs considered and patient agreeable to plan of care and goals.    Anticipated barriers to physical therapy: None    GOALS  Short Term Goals (4 weeks):  1. Patient will have improved PROM of the right ankle to WFLs.  2. Patient will have decreased complaints of pain to 3/10.  3. Patient will be independent and compliant with  issued HEP.  4. Patient will maintain right SLS for at least 15 seconds.      Long Term Goals (8 weeks):   Patient will have improved AROM of the right ankle to WFLs.  2. Patient will have improved strength of the right ankle to 4+/5.  3. Patient will be able to resume prior functional level with no deficits.   4. Patient will have overall improvement in condition to have decreased score on FOTO to <40%.     Plan   Continue PT per POC.     Maury Subramanian, PTA

## 2025-07-10 NOTE — PLAN OF CARE
A214/A214 HILARY Reyes Jr. is a 69 y.o.male admitted on 7/8/2025 for CHF (congestive heart failure)   Code Status: Full Code MRN: [id number]   Review of patient's allergies indicates:  No Known Allergies  Past Medical History:   Diagnosis Date    A-fib     Anticoagulant long-term use     Anxiety     Aortic aneurysm     Cataract     CHF (congestive heart failure)     Chronic atrial fibrillation     COPD (chronic obstructive pulmonary disease)     Coronary artery disease     Depression     HLD (hyperlipidemia)     Hypertension     Mitral regurgitation     PAD (peripheral artery disease)     Primary open angle glaucoma (POAG)       PRN meds    albuterol-ipratropium, 3 mL, Q4H PRN  dextrose 50%, 12.5 g, PRN  dextrose 50%, 25 g, PRN  glucagon (human recombinant), 1 mg, PRN  glucose, 16 g, PRN  glucose, 24 g, PRN  insulin aspart U-100, 0-10 Units, QID (AC + HS) PRN  melatonin, 6 mg, Nightly PRN  ondansetron, 4 mg, Q6H PRN  senna-docusate, 1 tablet, Daily PRN  sodium chloride 0.9%, 10 mL, PRN      Chart check completed. Will continue plan of care.         [place] Coma Scale Score: 15     Lead Monitored: Lead II Rhythm: atrial rhythm Frequency/Ectopy: PACs  Cardiac/Telemetry Box Number: [id number]  VTE Core Measure: (SCDs) Sequential compression device initiated/maintained Last Bowel Movement: 07/09/25  Diet Low Sodium (2 gm) Fluid - 1500mL  Voiding Characteristics: frequency (DIURETICS)  Paddy Score: 18  Fall Risk Score: 13  Accucheck []   Freq?      Lines/Drains/Airways       Peripheral Intravenous Line  Duration             Peripheral IV Single Lumen 07/08/25 1651 20 G Anterior;Right Forearm 2 days

## 2025-07-11 LAB
ABSOLUTE EOSINOPHIL (OHS): 0.05 K/UL
ABSOLUTE MONOCYTE (OHS): 0.73 K/UL (ref 0.3–1)
ABSOLUTE NEUTROPHIL COUNT (OHS): 3.47 K/UL (ref 1.8–7.7)
ACANTHOCYTES BLD QL SMEAR: PRESENT
ANION GAP (OHS): 11 MMOL/L (ref 8–16)
ANISOCYTOSIS BLD QL SMEAR: NORMAL
BASOPHILS # BLD AUTO: 0.01 K/UL
BASOPHILS NFR BLD AUTO: 0.2 %
BUN SERPL-MCNC: 17 MG/DL (ref 8–23)
CALCIUM SERPL-MCNC: 9 MG/DL (ref 8.7–10.5)
CHLORIDE SERPL-SCNC: 88 MMOL/L (ref 95–110)
CO2 SERPL-SCNC: 37 MMOL/L (ref 23–29)
CREAT SERPL-MCNC: 1 MG/DL (ref 0.5–1.4)
DACRYOCYTES BLD QL SMEAR: NORMAL
ERYTHROCYTE [DISTWIDTH] IN BLOOD BY AUTOMATED COUNT: 26.6 % (ref 11.5–14.5)
GFR SERPLBLD CREATININE-BSD FMLA CKD-EPI: >60 ML/MIN/1.73/M2
GLUCOSE SERPL-MCNC: 111 MG/DL (ref 70–110)
HCT VFR BLD AUTO: 25.4 % (ref 40–54)
HGB BLD-MCNC: 7.6 GM/DL (ref 14–18)
HYPOCHROMIA BLD QL SMEAR: NORMAL
IMM GRANULOCYTES # BLD AUTO: 0.02 K/UL (ref 0–0.04)
IMM GRANULOCYTES NFR BLD AUTO: 0.4 % (ref 0–0.5)
LARGE/GIANT PLATELETS (OHS): PRESENT
LYMPHOCYTES # BLD AUTO: 0.89 K/UL (ref 1–4.8)
MAGNESIUM SERPL-MCNC: 1.7 MG/DL (ref 1.6–2.6)
MCH RBC QN AUTO: 26.3 PG (ref 27–31)
MCHC RBC AUTO-ENTMCNC: 29.9 G/DL (ref 32–36)
MCV RBC AUTO: 88 FL (ref 82–98)
NUCLEATED RBC (/100WBC) (OHS): 0 /100 WBC
OVALOCYTES BLD QL SMEAR: NORMAL
PLATELET # BLD AUTO: 214 K/UL (ref 150–450)
PLATELET BLD QL SMEAR: NORMAL
PMV BLD AUTO: 10.3 FL (ref 9.2–12.9)
POCT GLUCOSE: 100 MG/DL (ref 70–110)
POCT GLUCOSE: 121 MG/DL (ref 70–110)
POCT GLUCOSE: 124 MG/DL (ref 70–110)
POCT GLUCOSE: 132 MG/DL (ref 70–110)
POIKILOCYTOSIS BLD QL SMEAR: NORMAL
POTASSIUM SERPL-SCNC: 2.6 MMOL/L (ref 3.5–5.1)
POTASSIUM SERPL-SCNC: 2.7 MMOL/L (ref 3.5–5.1)
RBC # BLD AUTO: 2.89 M/UL (ref 4.6–6.2)
RELATIVE EOSINOPHIL (OHS): 1 %
RELATIVE LYMPHOCYTE (OHS): 17.2 % (ref 18–48)
RELATIVE MONOCYTE (OHS): 14.1 % (ref 4–15)
RELATIVE NEUTROPHIL (OHS): 67.1 % (ref 38–73)
SCHISTOCYTES BLD QL SMEAR: PRESENT
SODIUM SERPL-SCNC: 136 MMOL/L (ref 136–145)
WBC # BLD AUTO: 5.17 K/UL (ref 3.9–12.7)

## 2025-07-11 PROCEDURE — 97530 THERAPEUTIC ACTIVITIES: CPT

## 2025-07-11 PROCEDURE — 83735 ASSAY OF MAGNESIUM: CPT | Performed by: INTERNAL MEDICINE

## 2025-07-11 PROCEDURE — 93005 ELECTROCARDIOGRAM TRACING: CPT

## 2025-07-11 PROCEDURE — 25000003 PHARM REV CODE 250: Performed by: INTERNAL MEDICINE

## 2025-07-11 PROCEDURE — 36415 COLL VENOUS BLD VENIPUNCTURE: CPT | Performed by: INTERNAL MEDICINE

## 2025-07-11 PROCEDURE — 21400001 HC TELEMETRY ROOM

## 2025-07-11 PROCEDURE — 25000003 PHARM REV CODE 250: Performed by: HOSPITALIST

## 2025-07-11 PROCEDURE — 82310 ASSAY OF CALCIUM: CPT | Performed by: INTERNAL MEDICINE

## 2025-07-11 PROCEDURE — 63600175 PHARM REV CODE 636 W HCPCS: Performed by: HOSPITALIST

## 2025-07-11 PROCEDURE — 85025 COMPLETE CBC W/AUTO DIFF WBC: CPT | Performed by: INTERNAL MEDICINE

## 2025-07-11 PROCEDURE — 63600175 PHARM REV CODE 636 W HCPCS: Performed by: INTERNAL MEDICINE

## 2025-07-11 PROCEDURE — 84132 ASSAY OF SERUM POTASSIUM: CPT | Performed by: INTERNAL MEDICINE

## 2025-07-11 PROCEDURE — 93010 ELECTROCARDIOGRAM REPORT: CPT | Mod: ,,, | Performed by: INTERNAL MEDICINE

## 2025-07-11 PROCEDURE — 27000207 HC ISOLATION

## 2025-07-11 RX ORDER — POTASSIUM CHLORIDE 20 MEQ/1
40 TABLET, EXTENDED RELEASE ORAL ONCE
Status: COMPLETED | OUTPATIENT
Start: 2025-07-11 | End: 2025-07-11

## 2025-07-11 RX ORDER — POTASSIUM CHLORIDE 7.45 MG/ML
10 INJECTION INTRAVENOUS
Status: COMPLETED | OUTPATIENT
Start: 2025-07-11 | End: 2025-07-11

## 2025-07-11 RX ADMIN — FUROSEMIDE 80 MG: 10 INJECTION, SOLUTION INTRAVENOUS at 08:07

## 2025-07-11 RX ADMIN — AMIODARONE HYDROCHLORIDE 400 MG: 200 TABLET ORAL at 08:07

## 2025-07-11 RX ADMIN — RIVAROXABAN 20 MG: 20 TABLET, FILM COATED ORAL at 04:07

## 2025-07-11 RX ADMIN — SERTRALINE HYDROCHLORIDE 50 MG: 50 TABLET ORAL at 08:07

## 2025-07-11 RX ADMIN — HYDROCORTISONE 20 MG: 10 TABLET ORAL at 08:07

## 2025-07-11 RX ADMIN — POTASSIUM CHLORIDE 10 MEQ: 7.46 INJECTION, SOLUTION INTRAVENOUS at 09:07

## 2025-07-11 RX ADMIN — POTASSIUM CHLORIDE 10 MEQ: 7.46 INJECTION, SOLUTION INTRAVENOUS at 07:07

## 2025-07-11 RX ADMIN — HYDROCORTISONE 10 MG: 10 TABLET ORAL at 08:07

## 2025-07-11 RX ADMIN — METOLAZONE 5 MG: 5 TABLET ORAL at 08:07

## 2025-07-11 RX ADMIN — POTASSIUM BICARBONATE 25 MEQ: 977.5 TABLET, EFFERVESCENT ORAL at 08:07

## 2025-07-11 RX ADMIN — POTASSIUM CHLORIDE 10 MEQ: 7.46 INJECTION, SOLUTION INTRAVENOUS at 10:07

## 2025-07-11 RX ADMIN — THIAMINE HCL TAB 100 MG 100 MG: 100 TAB at 08:07

## 2025-07-11 RX ADMIN — POTASSIUM CHLORIDE 40 MEQ: 1500 TABLET, EXTENDED RELEASE ORAL at 07:07

## 2025-07-11 RX ADMIN — POTASSIUM CHLORIDE 40 MEQ: 1500 TABLET, EXTENDED RELEASE ORAL at 08:07

## 2025-07-11 RX ADMIN — PANTOPRAZOLE SODIUM 40 MG: 40 TABLET, DELAYED RELEASE ORAL at 08:07

## 2025-07-11 RX ADMIN — ATORVASTATIN CALCIUM 40 MG: 40 TABLET, FILM COATED ORAL at 08:07

## 2025-07-11 NOTE — PLAN OF CARE
A214/A214 HILARY Reyes Jr. is a 69 y.o.male admitted on 7/8/2025 for CHF (congestive heart failure)   Code Status: Full Code MRN: [id number]   Review of patient's allergies indicates:  No Known Allergies  Past Medical History:   Diagnosis Date    A-fib     Anticoagulant long-term use     Anxiety     Aortic aneurysm     Cataract     CHF (congestive heart failure)     Chronic atrial fibrillation     COPD (chronic obstructive pulmonary disease)     Coronary artery disease     Depression     HLD (hyperlipidemia)     Hypertension     Mitral regurgitation     PAD (peripheral artery disease)     Primary open angle glaucoma (POAG)       PRN meds    albuterol-ipratropium, 3 mL, Q4H PRN  dextrose 50%, 12.5 g, PRN  dextrose 50%, 25 g, PRN  glucagon (human recombinant), 1 mg, PRN  glucose, 16 g, PRN  glucose, 24 g, PRN  insulin aspart U-100, 0-10 Units, QID (AC + HS) PRN  melatonin, 6 mg, Nightly PRN  ondansetron, 4 mg, Q6H PRN  senna-docusate, 1 tablet, Daily PRN  sodium chloride 0.9%, 10 mL, PRN      Chart check completed. Will continue plan of care.         [place]j Coma Scale Score: 15     Lead Monitored: Lead II Rhythm: atrial rhythm Frequency/Ectopy: PVCs  Cardiac/Telemetry Box Number: [id number]  VTE Core Measure: (SCDs) Sequential compression device initiated/maintained Last Bowel Movement: 07/10/25  Diet Low Sodium (2 gm) Fluid - 1500mL  Voiding Characteristics: frequency (DIURETICS)  Paddy Score: 18  Fall Risk Score: 13  Accucheck []   Freq?      Lines/Drains/Airways       Peripheral Intravenous Line  Duration             Peripheral IV Single Lumen 07/08/25 1651 20 G Anterior;Right Forearm 3 days

## 2025-07-11 NOTE — PLAN OF CARE
POC reviewed w/ patient. Pt verbalizes understanding of plan  Chart/Orders reviewed  All safety precautions in place; No falls this shift  Pt resting in bed  Pain controlled  Continuous rounding c bed in lowest position, side rails up, call light in reach  Will continue to monitor until the end of shift  Problem: Adult Inpatient Plan of Care  Goal: Plan of Care Review  Outcome: Progressing  Flowsheets (Taken 7/11/2025 0442)  Plan of Care Reviewed With: patient  Goal: Patient-Specific Goal (Individualized)  Outcome: Progressing  Flowsheets (Taken 7/11/2025 0442)  Individualized Care Needs: none  Anxieties, Fears or Concerns: none  Patient/Family-Specific Goals (Include Timeframe): none  Goal: Absence of Hospital-Acquired Illness or Injury  Outcome: Progressing  Goal: Optimal Comfort and Wellbeing  Outcome: Progressing  Goal: Readiness for Transition of Care  Outcome: Progressing

## 2025-07-11 NOTE — ASSESSMENT & PLAN NOTE
Patient's most recent potassium results are listed below.   Recent Labs     07/09/25  0453 07/10/25  0457 07/11/25  0426   K 3.4* 2.9* 2.6*     Plan  - Replete potassium per protocol  - Monitor potassium Daily  - Patient's hypokalemia is worsening. Will adjust treatment as follows: IV and oral supplementation  - repeat labs tonight  -obtain mag level

## 2025-07-11 NOTE — PROGRESS NOTES
[facility] Medicine  Progress Note    Patient Name: Ran Reyes Jr.  MRN: [id number]  Patient Class: IP- Inpatient   Admission Date: 7/8/2025  Length of Stay: 2 days  Attending Physician: Savanna Gomez MD  Primary Care Provider: Abiodun Recinos DO Subjective     Principal Problem:CHF (congestive heart failure)        HPI:  The patient is a 70 yo male with past medical history of CHF, afib, depression and glaucoma who presented to the ED with shortness of breath and BLE edema. He reports started a week ago and has worsened. He was unable to lay flat. He has not slept well the last couple of nights. Workup in the ED revealed elevated BNP of 1837. He was given IV lasix in the ED. Hospital medicine was consulted. Patient placed in observation.     Overview/Hospital Course:  No notes on file    Interval History: f/u CHF  patient with persistent edema and shortness of breath    Review of Systems  Objective:     Vital Signs (Most Recent):  Temp: 98.1 °F (36.7 °C) (07/11/25 1421)  Pulse: 61 (07/11/25 1700)  Resp: 18 (07/11/25 1421)  BP: 116/74 (07/11/25 1421)  SpO2: 99 % (07/11/25 1421) Vital Signs (24h Range):  Temp:  [97.4 °F (36.3 °C)-98.4 °F (36.9 °C)] 98.1 °F (36.7 °C)  Pulse:  [54-85] 61  Resp:  [17-20] 18  SpO2:  [98 %-100 %] 99 %  BP: ()/(64-74) 116/74     Weight: 91.2 kg (201 lb 1 oz)  Body mass index is 33.46 kg/m².    Intake/Output Summary (Last 24 hours) at 7/11/2025 1706  Last data filed at 7/11/2025 1702  Gross per 24 hour   Intake --   Output 7200 ml   Net -7200 ml         Physical Exam  HENT:      Head: Normocephalic and atraumatic.   Cardiovascular:      Rate and Rhythm: Normal rate and regular rhythm.      Heart sounds: No murmur heard.  Pulmonary:      Effort: Pulmonary effort is normal. No respiratory distress.      Breath sounds: Decreased breath sounds present. No wheezing.   Abdominal:      General: Bowel sounds are normal. There is no distension.       Palpations: Abdomen is soft.      Tenderness: There is no abdominal tenderness.   Musculoskeletal:         General: Swelling present.      Right lower leg: Edema present.      Left lower leg: Edema present.   Skin:     General: Skin is warm and dry.   Neurological:      Mental Status: He is alert and oriented to person, place, and time. Mental status is at baseline.               Significant Labs: All pertinent labs within the past 24 hours have been reviewed.  Recent Lab Results  (Last 5 results in the past 24 hours)        07/11/25  1645   07/11/25  1123   07/11/25  0548   07/11/25  0426   07/10/25  2119        Acanthocytes       Present         Anion Gap       11  Comment: UNABLE TO CALCULATE         Aniso       Marked         Baso #       0.01         Basophil %       0.2         BUN       17         Calcium       9.0         Chloride       88         CO2       37         Creatinine       1.0         eGFR       >60  Comment: Estimated GFR calculated using the CKD-EPI creatinine (2021) equation.         Eos #       0.05         Eos %       1.0         Giant Platelets       Present         Glucose       111         Gran # (ANC)       3.47         Hematocrit       25.4         Hemoglobin       7.6         Hypo       Occasional         Immature Grans (Abs)       0.02  Comment: Mild elevation in immature granulocytes is non specific and can be seen in a variety of conditions including stress response, acute inflammation, trauma and pregnancy. Correlation with other laboratory and clinical findings is essential.         Immature Granulocytes       0.4         Lymph #       0.89         Lymph %       17.2         MCH       26.3         MCHC       29.9         MCV       88         Mono #       0.73         Mono %       14.1         MPV       10.3         Neut %       67.1         nRBC       0         Ovalocytes       Occasional         Platelet Estimate       Appears Normal         Platelet Count       214         POCT  "Glucose 132   100   124     131       Poikilocytosis       Marked         Potassium       2.6         RBC       2.89         RDW       26.6         Schistocytes       Present         Sodium       136         Teardrop Cells       Occasional         WBC       5.17                                Significant Imaging: I have reviewed all pertinent imaging results/findings within the past 24 hours.      Assessment & Plan  CHF (congestive heart failure)  Patient has Combined Systolic and Diastolic heart failure that is Acute on chronic. On presentation their CHF was decompensated. Evidence of decompensated CHF on presentation includes: edema, paroxysmal nocturnal dyspnea (PND), dyspnea on exertion (MON[place]), and shortness of breath. The etiology of their decompensation is likely dietary indiscretion and increased fluid intake. Most recent BNP and echo results are listed below.  No results for input(s): "BNP" in the last 72 hours.    Latest ECHO  Results for orders placed during the hospital encounter of 05/23/25    Echo Saline Bubble? No    Interpretation Summary    Left Ventricle: The left ventricle is moderately dilated. LVEDD 6.6 cm. There is severely reduced systolic function with a visually estimated ejection fraction of 20 - 25%. There is diastolic dysfunction.    Right Ventricle: Systolic function is mildly reduced.    Mitral Valve: There is moderate (2+) regurgitation.    Tricuspid Valve: There is mild to moderate (1-2+)regurgitation.    Pulmonary Artery: The estimated pulmonary artery systolic pressure is 53 mmHg.    Current Heart Failure Medications  furosemide injection 80 mg, Every 12 hours, Intravenous  metOLazone tablet 5 mg, Daily, Oral    Plan  - Monitor strict I&Os and daily weights.    - Place on telemetry  - Low sodium diet  - Place on fluid restriction of 1.5 L.   - Cardiology has not been consulted  - The patient's volume status is being monitored  - IV lasix          Chronic atrial fibrillation  Patient " has permanent atrial fibrillation. Patient is currently in atrial fibrillation. WOHQA1FSFd Score: 3. The patients heart rate in the last 24 hours is as follows:  Pulse  Min: 54  Max: 85     Antiarrhythmics  amiodarone tablet 400 mg, Daily, Oral    Anticoagulants  rivaroxaban tablet 20 mg, With dinner, Oral    Plan  - Replete lytes with a goal of K>4, Mg >2  - Patient is anticoagulated, see medications listed above.  - Patient's afib is currently controlled  -       Type 2 diabetes mellitus, without long-term current use of insulin  Patient's FSGs are controlled on current medication regimen.  Last A1c reviewed-   Lab Results   Component Value Date    HGBA1C 6.0 (H) 07/08/2025     Most recent fingerstick glucose reviewed-   Recent Labs   Lab 07/10/25  2119 07/11/25  0548 07/11/25  1123 07/11/25  1645   POCTGLUCOSE 131* 124* 100 132*     Current correctional scale  Medium  Maintain anti-hyperglycemic dose as follows-   Antihyperglycemics (From admission, onward)      Start     Stop Route Frequency Ordered    07/08/25 2023  insulin aspart U-100 pen 0-10 Units         -- SubQ Before meals & nightly PRN 07/08/25 1923          Hold Oral hypoglycemics while patient is in the hospital.      Hypokalemia  Patient's most recent potassium results are listed below.   Recent Labs     07/09/25  0453 07/10/25  0457 07/11/25  0426   K 3.4* 2.9* 2.6*     Plan  - Replete potassium per protocol  - Monitor potassium Daily  - Patient's hypokalemia is worsening. Will adjust treatment as follows: IV and oral supplementation  - repeat labs tonight  -obtain mag level  VTE Risk Mitigation (From admission, onward)           Ordered     rivaroxaban tablet 20 mg  With dinner         07/08/25 1920     IP VTE HIGH RISK PATIENT  Once         07/08/25 1901     Place sequential compression device  Until discontinued         07/08/25 1901                    Discharge Planning   ZEINAB:      Code Status: Full Code   Medical Readiness for Discharge Date:    Discharge Plan A: Skilled Nursing Facility                  Please place Justification for DME      Savanna Gomez MD  Department of Hospital Medicine   [facility] - Telemetry ([facility])

## 2025-07-11 NOTE — ASSESSMENT & PLAN NOTE
Patient's FSGs are controlled on current medication regimen.  Last A1c reviewed-   Lab Results   Component Value Date    HGBA1C 6.0 (H) 07/08/2025     Most recent fingerstick glucose reviewed-   Recent Labs   Lab 07/10/25  2119 07/11/25  0548 07/11/25  1123 07/11/25  1645   POCTGLUCOSE 131* 124* 100 132*     Current correctional scale  Medium  Maintain anti-hyperglycemic dose as follows-   Antihyperglycemics (From admission, onward)      Start     Stop Route Frequency Ordered    07/08/25 2023  insulin aspart U-100 pen 0-10 Units         -- SubQ Before meals & nightly PRN 07/08/25 1923          Hold Oral hypoglycemics while patient is in the hospital.

## 2025-07-11 NOTE — ASSESSMENT & PLAN NOTE
"Patient has Combined Systolic and Diastolic heart failure that is Acute on chronic. On presentation their CHF was decompensated. Evidence of decompensated CHF on presentation includes: edema, paroxysmal nocturnal dyspnea (PND), dyspnea on exertion (MON[place]), and shortness of breath. The etiology of their decompensation is likely dietary indiscretion and increased fluid intake. Most recent BNP and echo results are listed below.  No results for input(s): "BNP" in the last 72 hours.    Latest ECHO  Results for orders placed during the hospital encounter of 05/23/25    Echo Saline Bubble? No    Interpretation Summary    Left Ventricle: The left ventricle is moderately dilated. LVEDD 6.6 cm. There is severely reduced systolic function with a visually estimated ejection fraction of 20 - 25%. There is diastolic dysfunction.    Right Ventricle: Systolic function is mildly reduced.    Mitral Valve: There is moderate (2+) regurgitation.    Tricuspid Valve: There is mild to moderate (1-2+)regurgitation.    Pulmonary Artery: The estimated pulmonary artery systolic pressure is 53 mmHg.    Current Heart Failure Medications  furosemide injection 80 mg, Every 12 hours, Intravenous  metOLazone tablet 5 mg, Daily, Oral    Plan  - Monitor strict I&Os and daily weights.    - Place on telemetry  - Low sodium diet  - Place on fluid restriction of 1.5 L.   - Cardiology has not been consulted  - The patient's volume status is being monitored  - IV lasix          "

## 2025-07-11 NOTE — ASSESSMENT & PLAN NOTE
Patient has permanent atrial fibrillation. Patient is currently in atrial fibrillation. [id number] Score: 3. The patients heart rate in the last 24 hours is as follows:  Pulse  Min: 54  Max: 85     Antiarrhythmics  amiodarone tablet 400 mg, Daily, Oral    Anticoagulants  rivaroxaban tablet 20 mg, With dinner, Oral    Plan  - Replete lytes with a goal of K>4, Mg >2  - Patient is anticoagulated, see medications listed above.  - Patient's afib is currently controlled  -

## 2025-07-11 NOTE — PT/OT/SLP PROGRESS
"Occupational Therapy      Patient Name:  Ran Reyes Jr.   MRN:  [id number]    Pt reports refusal of services, 2' to B leg pain, nurse already notified. Pt also reports not sleeping good "and didn't fall asleep until 5 AM".   Pt declined all OOB or TE this date.   ANGIE Mathis  OTR/L readily available for conference at the time of the provision of services- Rima Draper, OT  1 TA  Time in 0915  7/11/2025  "

## 2025-07-11 NOTE — SUBJECTIVE & OBJECTIVE
Interval History: f/u CHF  patient with persistent edema and shortness of breath    Review of Systems  Objective:     Vital Signs (Most Recent):  Temp: 98.1 °F (36.7 °C) (07/11/25 1421)  Pulse: 61 (07/11/25 1700)  Resp: 18 (07/11/25 1421)  BP: 116/74 (07/11/25 1421)  SpO2: 99 % (07/11/25 1421) Vital Signs (24h Range):  Temp:  [97.4 °F (36.3 °C)-98.4 °F (36.9 °C)] 98.1 °F (36.7 °C)  Pulse:  [54-85] 61  Resp:  [17-20] 18  SpO2:  [98 %-100 %] 99 %  BP: ()/(64-74) 116/74     Weight: 91.2 kg (201 lb 1 oz)  Body mass index is 33.46 kg/m².    Intake/Output Summary (Last 24 hours) at 7/11/2025 1706  Last data filed at 7/11/2025 1702  Gross per 24 hour   Intake --   Output 7200 ml   Net -7200 ml         Physical Exam  HENT:      Head: Normocephalic and atraumatic.   Cardiovascular:      Rate and Rhythm: Normal rate and regular rhythm.      Heart sounds: No murmur heard.  Pulmonary:      Effort: Pulmonary effort is normal. No respiratory distress.      Breath sounds: Decreased breath sounds present. No wheezing.   Abdominal:      General: Bowel sounds are normal. There is no distension.      Palpations: Abdomen is soft.      Tenderness: There is no abdominal tenderness.   Musculoskeletal:         General: Swelling present.      Right lower leg: Edema present.      Left lower leg: Edema present.   Skin:     General: Skin is warm and dry.   Neurological:      Mental Status: He is alert and oriented to person, place, and time. Mental status is at baseline.               Significant Labs: All pertinent labs within the past 24 hours have been reviewed.  Recent Lab Results  (Last 5 results in the past 24 hours)        07/11/25  1645   07/11/25  1123   07/11/25  0548   07/11/25  0426   07/10/25  2119        Acanthocytes       Present         Anion Gap       11  Comment: UNABLE TO CALCULATE         Aniso       Marked         Baso #       0.01         Basophil %       0.2         BUN       17         Calcium       9.0          Chloride       88         CO2       37         Creatinine       1.0         eGFR       >60  Comment: Estimated GFR calculated using the CKD-EPI creatinine (2021) equation.         Eos #       0.05         Eos %       1.0         Giant Platelets       Present         Glucose       111         Gran # (ANC)       3.47         Hematocrit       25.4         Hemoglobin       7.6         Hypo       Occasional         Immature Grans (Abs)       0.02  Comment: Mild elevation in immature granulocytes is non specific and can be seen in a variety of conditions including stress response, acute inflammation, trauma and pregnancy. Correlation with other laboratory and clinical findings is essential.         Immature Granulocytes       0.4         Lymph #       0.89         Lymph %       17.2         MCH       26.3         MCHC       29.9         MCV       88         Mono #       0.73         Mono %       14.1         MPV       10.3         Neut %       67.1         nRBC       0         Ovalocytes       Occasional         Platelet Estimate       Appears Normal         Platelet Count       214         POCT Glucose 132   100   124     131       Poikilocytosis       Marked         Potassium       2.6         RBC       2.89         RDW       26.6         Schistocytes       Present         Sodium       136         Teardrop Cells       Occasional         WBC       5.17                                Significant Imaging: I have reviewed all pertinent imaging results/findings within the past 24 hours.

## 2025-07-11 NOTE — PLAN OF CARE
Pt refused all PT interventions this date. Pt educated on importance of OOB activity and to complete TherEx. Recommending moderate intensity therapy upon d/c.

## 2025-07-12 LAB
ANION GAP (OHS): 10 MMOL/L (ref 8–16)
BUN SERPL-MCNC: 26 MG/DL (ref 8–23)
CALCIUM SERPL-MCNC: 9.4 MG/DL (ref 8.7–10.5)
CHLORIDE SERPL-SCNC: 86 MMOL/L (ref 95–110)
CO2 SERPL-SCNC: 38 MMOL/L (ref 23–29)
CREAT SERPL-MCNC: 1.3 MG/DL (ref 0.5–1.4)
GFR SERPLBLD CREATININE-BSD FMLA CKD-EPI: 60 ML/MIN/1.73/M2
GLUCOSE SERPL-MCNC: 103 MG/DL (ref 70–110)
POCT GLUCOSE: 109 MG/DL (ref 70–110)
POCT GLUCOSE: 147 MG/DL (ref 70–110)
POCT GLUCOSE: 164 MG/DL (ref 70–110)
POCT GLUCOSE: 97 MG/DL (ref 70–110)
POTASSIUM SERPL-SCNC: 3.9 MMOL/L (ref 3.5–5.1)
SODIUM SERPL-SCNC: 134 MMOL/L (ref 136–145)

## 2025-07-12 PROCEDURE — 97116 GAIT TRAINING THERAPY: CPT | Mod: CQ

## 2025-07-12 PROCEDURE — 63600175 PHARM REV CODE 636 W HCPCS: Performed by: INTERNAL MEDICINE

## 2025-07-12 PROCEDURE — 97530 THERAPEUTIC ACTIVITIES: CPT

## 2025-07-12 PROCEDURE — 25000003 PHARM REV CODE 250: Performed by: NURSE PRACTITIONER

## 2025-07-12 PROCEDURE — 21400001 HC TELEMETRY ROOM

## 2025-07-12 PROCEDURE — 36415 COLL VENOUS BLD VENIPUNCTURE: CPT | Performed by: INTERNAL MEDICINE

## 2025-07-12 PROCEDURE — 27000207 HC ISOLATION

## 2025-07-12 PROCEDURE — 80048 BASIC METABOLIC PNL TOTAL CA: CPT | Performed by: INTERNAL MEDICINE

## 2025-07-12 PROCEDURE — 25000003 PHARM REV CODE 250: Performed by: INTERNAL MEDICINE

## 2025-07-12 PROCEDURE — 97530 THERAPEUTIC ACTIVITIES: CPT | Mod: CQ

## 2025-07-12 RX ORDER — ACETAMINOPHEN 325 MG/1
650 TABLET ORAL EVERY 6 HOURS PRN
Status: DISCONTINUED | OUTPATIENT
Start: 2025-07-12 | End: 2025-07-14 | Stop reason: HOSPADM

## 2025-07-12 RX ADMIN — HYDROCORTISONE 10 MG: 10 TABLET ORAL at 08:07

## 2025-07-12 RX ADMIN — HYDROCORTISONE 20 MG: 10 TABLET ORAL at 09:07

## 2025-07-12 RX ADMIN — AMIODARONE HYDROCHLORIDE 400 MG: 200 TABLET ORAL at 09:07

## 2025-07-12 RX ADMIN — PANTOPRAZOLE SODIUM 40 MG: 40 TABLET, DELAYED RELEASE ORAL at 09:07

## 2025-07-12 RX ADMIN — METOLAZONE 5 MG: 5 TABLET ORAL at 09:07

## 2025-07-12 RX ADMIN — ACETAMINOPHEN 650 MG: 325 TABLET ORAL at 01:07

## 2025-07-12 RX ADMIN — RIVAROXABAN 20 MG: 20 TABLET, FILM COATED ORAL at 04:07

## 2025-07-12 RX ADMIN — SERTRALINE HYDROCHLORIDE 50 MG: 50 TABLET ORAL at 09:07

## 2025-07-12 RX ADMIN — FUROSEMIDE 80 MG: 10 INJECTION, SOLUTION INTRAVENOUS at 09:07

## 2025-07-12 RX ADMIN — THIAMINE HCL TAB 100 MG 100 MG: 100 TAB at 09:07

## 2025-07-12 RX ADMIN — ATORVASTATIN CALCIUM 40 MG: 40 TABLET, FILM COATED ORAL at 08:07

## 2025-07-12 NOTE — SUBJECTIVE & OBJECTIVE
Interval History: f/u CHF patient diuresing well    Review of Systems  Objective:     Vital Signs (Most Recent):  Temp: 97.4 °F (36.3 °C) (07/12/25 1608)  Pulse: 65 (07/12/25 1728)  Resp: 18 (07/12/25 1608)  BP: 96/64 (07/12/25 1608)  SpO2: 100 % (07/12/25 1608) Vital Signs (24h Range):  Temp:  [97.4 °F (36.3 °C)-98.4 °F (36.9 °C)] 97.4 °F (36.3 °C)  Pulse:  [51-66] 65  Resp:  [18-20] 18  SpO2:  [93 %-100 %] 100 %  BP: ()/(64-74) 96/64     Weight: 86.5 kg (190 lb 11.2 oz)  Body mass index is 31.73 kg/m².    Intake/Output Summary (Last 24 hours) at 7/12/2025 1852  Last data filed at 7/12/2025 1210  Gross per 24 hour   Intake 240 ml   Output 4125 ml   Net -3885 ml         Physical Exam  HENT:      Head: Normocephalic and atraumatic.   Cardiovascular:      Rate and Rhythm: Normal rate and regular rhythm.      Heart sounds: No murmur heard.  Pulmonary:      Effort: Pulmonary effort is normal. No respiratory distress.      Breath sounds: Normal breath sounds. No wheezing.   Abdominal:      General: Bowel sounds are normal. There is no distension.      Palpations: Abdomen is soft.      Tenderness: There is no abdominal tenderness.   Musculoskeletal:         General: Swelling present.      Right lower leg: Edema present.      Left lower leg: Edema present.   Skin:     General: Skin is warm and dry.   Neurological:      Mental Status: He is alert and oriented to person, place, and time. Mental status is at baseline.               Significant Labs: All pertinent labs within the past 24 hours have been reviewed.  Recent Lab Results  (Last 5 results in the past 24 hours)        07/12/25  1636   07/12/25  1146   07/12/25  0656   07/12/25  0355   07/11/25  2048        Anion Gap       10         BUN       26         Calcium       9.4         Chloride       86         CO2       38         Creatinine       1.3         eGFR       60  Comment: Estimated GFR calculated using the CKD-EPI creatinine (2021) equation.          Glucose       103         POCT Glucose 147   109   97     121       Potassium       3.9         Sodium       134                                Significant Imaging: I have reviewed all pertinent imaging results/findings within the past 24 hours.

## 2025-07-12 NOTE — PLAN OF CARE
POC reviewed w/ patient. Pt verbalizes understanding of plan  Chart/Orders reviewed  All safety precautions in place; No falls this shift  Pt resting in bed  Pain controlled  Continuous rounding c bed in lowest position, side rails up, call light in reach  Will continue to monitor until the end of shift  Problem: Skin Injury Risk Increased  Goal: Skin Health and Integrity  Outcome: Progressing

## 2025-07-12 NOTE — ASSESSMENT & PLAN NOTE
Patient's most recent potassium results are listed below.   Recent Labs     07/11/25  0426 07/11/25  1734 07/12/25  0355   K 2.6* 2.7* 3.9     Plan  - Replete potassium per protocol  - Monitor potassium Daily  - Patient's hypokalemia is improving  - repeat labs tonight  -obtain mag level

## 2025-07-12 NOTE — PT/OT/SLP PROGRESS
Physical Therapy  Treatment    Ran Reyes Jr.   MRN: [id number]   Admitting Diagnosis: CHF (congestive heart failure)       PT Start Time: 1040     PT Stop Time: 1105    PT Total Time (min): 25 min       Billable Minutes:  Gait Training 15 and Therapeutic Activity 10    Treatment Type: Treatment  PT/PTA: PTA     Number of PTA visits since last PT visit: 1       General Precautions: Standard, fall, contact  Orthopedic Precautions: N/A  Braces: N/A  Respiratory Status: Nasal cannula, flow 5 L/min    Spiritual, Cultural Beliefs, Nondenominational Practices, Values that Affect Care: no    Subjective:  Communicated with NAZARIO prior to session.      Pain/Comfort  Pain Rating 1: 0/10  Pain Rating Post-Intervention 1: 0/10    Treatment and Education:    BED MOB SBA     SCOOT TO EOB SBA     GT TRG WITH RW 2 X 18' O2 AT 5L CG       EDUCATED ON CALL DON'T FALL     PATIENT WITH BETTER PARTICIPATION AND WALKED FARTHER TODAY. PATIENT DID EXPERIENCE 1 EPISODE ON LOSS OF BALANCE WITH 180 DEGREE TURN WHILE WALKING        AM-PAC 6 CLICK MOBILITY  How much help from another person does this patient currently need?   1 = Unable, Total/Dependent Assistance  2 = A lot, Maximum/Moderate Assistance  3 = A little, Minimum/Contact Guard/Supervision  4 = None, Modified [place]/Independent    Turning over in bed (including adjusting bedclothes, sheets and blankets)?: 4  Sitting down on and standing up from a chair with arms (e.g., wheelchair, bedside commode, etc.): 3  Moving from lying on back to sitting on the side of the bed?: 4  Moving to and from a bed to a chair (including a wheelchair)?:  (NA)  Need to walk in hospital room?: 3  Climbing 3-5 steps with a railing?: 1    AM-PAC Raw Score CMS G-Code Modifier Level of Impairment Assistance   6 % Total / Unable   7 - 9 CM 80 - 100% Maximal Assist   10 - 14 CL 60 - 80% Moderate Assist   15 - 19 CK 40 - 60% Moderate Assist   20 - 22 CJ 20 - 40% Minimal Assist   23 CI 1-20% SBA / CGA    24 CH 0% Independent/ Mod I     Patient left up in chair with all lines intact and call button in reach.    Assessment:  Ran Reyes Jr. is a 69 y.o. male with a medical diagnosis of CHF (congestive heart failure) and presents with .    Rehab identified problem list/impairments: weakness, impaired endurance, impaired self care skills, impaired functional mobility, gait instability, impaired balance, decreased upper extremity function, decreased lower extremity function    Rehab potential is good.    Activity tolerance: Fair    Discharge recommendations: Moderate Intensity Therapy      Barriers to discharge:      Equipment recommendations: to be determined by next level of care     GOALS:   Multidisciplinary Problems       Physical Therapy Goals          Problem: Physical Therapy    Goal Priority Disciplines Outcome Interventions   Physical Therapy Goal     PT, PT/OT     Description: Pt will perform bed mobility independently in order to participate in EOB activity.  Pt will perform transfers with independently in order to participate in OOB activity.  Pt will ambulate 350 ft SPV with LRAD in order to participate in daily tasks.   Pt will improve functional status as evidenced by 2 pt increase in AMPAC score.                        DME Justifications:   Ran's mobility limitation cannot be sufficiently resolved by the use of a cane. His functional mobility deficit can be sufficiently resolved with the use of a Rolling Walker. Patient's mobility limitation significantly impairs their ability to participate in one of more activities of daily living.  The use of a RW will significantly improve the patient's ability to participate in MRADLS and the patient will use it on regular basis in the home.    PLAN:    Patient to be seen 3 x/week to address the above listed problems via gait training, therapeutic activities, therapeutic exercises  Plan of Care expires: 07/23/25  Plan of Care reviewed with: patient          07/12/2025

## 2025-07-12 NOTE — PT/OT/SLP PROGRESS
Occupational Therapy   Treatment    Name: Ran Reyes Jr.  MRN: [id number]  Admitting Diagnosis:  CHF (congestive heart failure)       Recommendations:     Discharge Recommendations: Moderate Intensity Therapy  Discharge Equipment Recommendations:  to be determined by next level of care  Barriers to discharge:  Inaccessible home environment, Decreased caregiver support    Assessment:     Ran Reyes Jr. is a 69 y.o. male with a medical diagnosis of CHF (congestive heart failure).  He presents with the following performance deficits affecting function are weakness, impaired endurance, impaired self care skills, impaired functional mobility, gait instability, impaired balance, impaired cardiopulmonary response to activity, decreased safety awareness, decreased lower extremity function, decreased upper extremity function, decreased coordination.     Rehab Prognosis:  Fair; patient would benefit from acute skilled OT services to address these deficits and reach maximum level of function.       Plan:     Patient to be seen 2 x/week to address the above listed problems via self-care/home management, therapeutic activities, therapeutic exercises  Plan of Care Expires: 07/23/25  Plan of Care Reviewed with: patient    Subjective     Chief Complaint: I am going to try today  Patient/Family Comments/goals: to get stronger and return home   Pain/Comfort:  Pain Rating 1: 0/10    Objective:     Communicated with: nursekhadra, prior to session.  Patient found supine with peripheral IV, oxygen, telemetry upon OT entry to room.    General Precautions: Standard, fall, respiratory    Orthopedic Precautions:N/A  Braces: N/A  Respiratory Status: Nasal cannula, flow 5 L/min     Occupational Performance:     Bed Mobility:    Patient completed Scooting/Bridging with stand by assistance  Patient completed Supine to Sit with stand by assistance  Sat EOB x5 mins SBA     Functional Mobility/Transfers:  Patient completed Sit <> Stand  Transfer with contact guard assistance  with  rolling walker   Patient completed Bed <> Chair Transfer using Step Transfer technique with contact guard assistance with rolling walker  Functional Mobility: 30 ft c RW c CGA, pacing techniques used 2' to fatigue with task and energy conservation req throughout.     Activities of Daily Living:  Donned gown with SBA around back       [facility] 6 Click ADL: 19    Treatment & Education:  Encouraged completion of B UE AROM therex throughout the day to increase functional strength and activity tolerance needed for ADL completion.  Explained importance of sitting OOB to improve CV endurance, and encouraged to complete at least 2 hours throughout the day.   OT role, plan of care, progression of goals, importance of continued OOB activity, ADL/functional transfer and mobility retraining, call don't fall, safety precautions, fall prevention.  Pt willing to participate this date and completed tasks well, req increased cues to pace self with energy conservation techniques.     Patient left up in chair with all lines intact, call button in reach, and chair alarm on    GOALS:   Multidisciplinary Problems       Occupational Therapy Goals          Problem: Occupational Therapy    Goal Priority Disciplines Outcome Interventions   Occupational Therapy Goal     OT, PT/OT Progressing    Description: Goals to be met by: 7/23/25     Patient will increase functional independence with ADLs by performing:    LE Dressing with Stand-by Assistance.  Grooming while standing at sink with Stand-by Assistance.  Toileting from toilet with Stand-by Assistance for hygiene and clothing management.   Toilet transfer to toilet with Stand-by Assistance.                         DME Justifications:  No DME recommended requiring DME justifications    Time Tracking:     OT Date of Treatment: 07/12/25  OT Start Time: 1015  OT Stop Time: 1040  OT Total Time (min): 25 min    Billable Minutes:Therapeutic Activity  25  ANGIE Mathis  OTR/L readily available for conference at the time of the provision of services- Rocío Braun OT  OT/ALLAN: ALLAN     Number of ALLAN visits since last OT visit: 2    7/12/2025

## 2025-07-12 NOTE — ASSESSMENT & PLAN NOTE
"Patient has Combined Systolic and Diastolic heart failure that is Acute on chronic. On presentation their CHF was decompensated. Evidence of decompensated CHF on presentation includes: edema, paroxysmal nocturnal dyspnea (PND), dyspnea on exertion ([place]), and shortness of breath. The etiology of their decompensation is likely dietary indiscretion and increased fluid intake. Most recent BNP and echo results are listed below.  No results for input(s): "BNP" in the last 72 hours.    Latest ECHO  Results for orders placed during the hospital encounter of 05/23/25    Echo Saline Bubble? No    Interpretation Summary    Left Ventricle: The left ventricle is moderately dilated. LVEDD 6.6 cm. There is severely reduced systolic function with a visually estimated ejection fraction of 20 - 25%. There is diastolic dysfunction.    Right Ventricle: Systolic function is mildly reduced.    Mitral Valve: There is moderate (2+) regurgitation.    Tricuspid Valve: There is mild to moderate (1-2+)regurgitation.    Pulmonary Artery: The estimated pulmonary artery systolic pressure is 53 mmHg.    Current Heart Failure Medications   furosemide injection 80 mg, Every 12 hours, Intravenous     Plan  - Monitor strict I&Os and daily weights.    - Place on telemetry  - Low sodium diet  - Place on fluid restriction of 1.5 L.   - Cardiology has not been consulted  - The patient's volume status is being monitored  - IV lasix          "

## 2025-07-12 NOTE — PLAN OF CARE
BED MOB SBA    SCOOT TO EOB SBA    GT TRG WITH RW 2 X 18' O2 AT 5L CG      EDUCATED ON CALL DON'T FALL    PATIENT WITH BETTER PARTICIPATION AND WALKED FARTHER TODAY. PATIENT DID EXPERIENCE 1 EPISODE ON LOSS OF BALANCE WITH 180 DEGREE TURN WHILE WALKING

## 2025-07-12 NOTE — PLAN OF CARE
A214/A214 HILARY Reyes Jr. is a 69 y.o.male admitted on 7/8/2025 for CHF (congestive heart failure)   Code Status: Full Code MRN: [id number]   Review of patient's allergies indicates:  No Known Allergies  Past Medical History:   Diagnosis Date    A-fib     Anticoagulant long-term use     Anxiety     Aortic aneurysm     Cataract     CHF (congestive heart failure)     Chronic atrial fibrillation     COPD (chronic obstructive pulmonary disease)     Coronary artery disease     Depression     HLD (hyperlipidemia)     Hypertension     Mitral regurgitation     PAD (peripheral artery disease)     Primary open angle glaucoma (POAG)       PRN meds    acetaminophen, 650 mg, Q6H PRN  albuterol-ipratropium, 3 mL, Q4H PRN  dextrose 50%, 12.5 g, PRN  dextrose 50%, 25 g, PRN  glucagon (human recombinant), 1 mg, PRN  glucose, 16 g, PRN  glucose, 24 g, PRN  insulin aspart U-100, 0-10 Units, QID (AC + HS) PRN  melatonin, 6 mg, Nightly PRN  ondansetron, 4 mg, Q6H PRN  senna-docusate, 1 tablet, Daily PRN  sodium chloride 0.9%, 10 mL, PRN      Chart check completed. Will continue plan of care.         [place]unj Coma Scale Score: 15     Lead Monitored: Lead II Rhythm: atrial rhythm Frequency/Ectopy: PVCs  Cardiac/Telemetry Box Number: [id number]  VTE Core Measure: (SCDs) Sequential compression device initiated/maintained Last Bowel Movement: 07/10/25  Diet Low Sodium (2 gm) Double Portions; Fluid - 1500mL; Standard Tray  Voiding Characteristics: frequency (DIURETICS)  Paddy Score: 18  Fall Risk Score: 13  Accucheck []   Freq?      Lines/Drains/Airways       Peripheral Intravenous Line  Duration             Peripheral IV Single Lumen 07/11/25 2030 18 G Left;Posterior Forearm <1 day

## 2025-07-12 NOTE — ASSESSMENT & PLAN NOTE
Patient has permanent atrial fibrillation. Patient is currently in atrial fibrillation. [id number] Score: 3. The patients heart rate in the last 24 hours is as follows:  Pulse  Min: 51  Max: 66     Antiarrhythmics  amiodarone tablet 400 mg, Daily, Oral    Anticoagulants  rivaroxaban tablet 20 mg, With dinner, Oral    Plan  - Replete lytes with a goal of K>4, Mg >2  - Patient is anticoagulated, see medications listed above.  - Patient's afib is currently controlled  -

## 2025-07-12 NOTE — ASSESSMENT & PLAN NOTE
Patient's FSGs are controlled on current medication regimen.  Last A1c reviewed-   Lab Results   Component Value Date    HGBA1C 6.0 (H) 07/08/2025     Most recent fingerstick glucose reviewed-   Recent Labs   Lab 07/11/25  2048 07/12/25  0656 07/12/25  1146 07/12/25  1636   POCTGLUCOSE 121* 97 109 147*     Current correctional scale  Medium  Maintain anti-hyperglycemic dose as follows-   Antihyperglycemics (From admission, onward)      Start     Stop Route Frequency Ordered    07/08/25 2023  insulin aspart U-100 pen 0-10 Units         -- SubQ Before meals & nightly PRN 07/08/25 1923          Hold Oral hypoglycemics while patient is in the hospital.

## 2025-07-12 NOTE — PROGRESS NOTES
Critical access hospital Telemetry [facility] Medicine  Progress Note    Patient Name: Ran Reyes Jr.  MRN: [id number]  Patient Class: IP- Inpatient   Admission Date: 7/8/2025  Length of Stay: 3 days  Attending Physician: Savanna Gomez MD  Primary Care Provider: Abiodun Recinos DO Subjective     Principal Problem:CHF (congestive heart failure)        HPI:  The patient is a 68 yo male with past medical history of CHF, afib, depression and glaucoma who presented to the ED with shortness of breath and BLE edema. He reports started a week ago and has worsened. He was unable to lay flat. He has not slept well the last couple of nights. Workup in the ED revealed elevated BNP of 1837. He was given IV lasix in the ED. Hospital medicine was consulted. Patient placed in observation.     Overview/Hospital Course:  No notes on file    Interval History: f/u CHF patient diuresing well    Review of Systems  Objective:     Vital Signs (Most Recent):  Temp: 97.4 °F (36.3 °C) (07/12/25 1608)  Pulse: 65 (07/12/25 1728)  Resp: 18 (07/12/25 1608)  BP: 96/64 (07/12/25 1608)  SpO2: 100 % (07/12/25 1608) Vital Signs (24h Range):  Temp:  [97.4 °F (36.3 °C)-98.4 °F (36.9 °C)] 97.4 °F (36.3 °C)  Pulse:  [51-66] 65  Resp:  [18-20] 18  SpO2:  [93 %-100 %] 100 %  BP: ()/(64-74) 96/64     Weight: 86.5 kg (190 lb 11.2 oz)  Body mass index is 31.73 kg/m².    Intake/Output Summary (Last 24 hours) at 7/12/2025 1852  Last data filed at 7/12/2025 1210  Gross per 24 hour   Intake 240 ml   Output 4125 ml   Net -3885 ml         Physical Exam  HENT:      Head: Normocephalic and atraumatic.   Cardiovascular:      Rate and Rhythm: Normal rate and regular rhythm.      Heart sounds: No murmur heard.  Pulmonary:      Effort: Pulmonary effort is normal. No respiratory distress.      Breath sounds: Normal breath sounds. No wheezing.   Abdominal:      General: Bowel sounds are normal. There is no distension.      Palpations: Abdomen is soft.       "Tenderness: There is no abdominal tenderness.   Musculoskeletal:         General: Swelling present.      Right lower leg: Edema present.      Left lower leg: Edema present.   Skin:     General: Skin is warm and dry.   Neurological:      Mental Status: He is alert and oriented to person, place, and time. Mental status is at baseline.               Significant Labs: All pertinent labs within the past 24 hours have been reviewed.  Recent Lab Results  (Last 5 results in the past 24 hours)        07/12/25  1636   07/12/25  1146   07/12/25  0656   07/12/25  0355   07/11/25  2048        Anion Gap       10         BUN       26         Calcium       9.4         Chloride       86         CO2       38         Creatinine       1.3         eGFR       60  Comment: Estimated GFR calculated using the CKD-EPI creatinine (2021) equation.         Glucose       103         POCT Glucose 147   109   97     121       Potassium       3.9         Sodium       134                                Significant Imaging: I have reviewed all pertinent imaging results/findings within the past 24 hours.      Assessment & Plan  CHF (congestive heart failure)  Patient has Combined Systolic and Diastolic heart failure that is Acute on chronic. On presentation their CHF was decompensated. Evidence of decompensated CHF on presentation includes: edema, paroxysmal nocturnal dyspnea (PND), dyspnea on exertion ([place]), and shortness of breath. The etiology of their decompensation is likely dietary indiscretion and increased fluid intake. Most recent BNP and echo results are listed below.  No results for input(s): "BNP" in the last 72 hours.    Latest ECHO  Results for orders placed during the hospital encounter of 05/23/25    Echo Saline Bubble? No    Interpretation Summary    Left Ventricle: The left ventricle is moderately dilated. LVEDD 6.6 cm. There is severely reduced systolic function with a visually estimated ejection fraction of 20 - 25%. There is " diastolic dysfunction.    Right Ventricle: Systolic function is mildly reduced.    Mitral Valve: There is moderate (2+) regurgitation.    Tricuspid Valve: There is mild to moderate (1-2+)regurgitation.    Pulmonary Artery: The estimated pulmonary artery systolic pressure is 53 mmHg.    Current Heart Failure Medications   furosemide injection 80 mg, Every 12 hours, Intravenous     Plan  - Monitor strict I&Os and daily weights.    - Place on telemetry  - Low sodium diet  - Place on fluid restriction of 1.5 L.   - Cardiology has not been consulted  - The patient's volume status is being monitored  - IV lasix          Chronic atrial fibrillation  Patient has permanent atrial fibrillation. Patient is currently in atrial fibrillation. [id number] Score: 3. The patients heart rate in the last 24 hours is as follows:  Pulse  Min: 51  Max: 66     Antiarrhythmics  amiodarone tablet 400 mg, Daily, Oral    Anticoagulants  rivaroxaban tablet 20 mg, With dinner, Oral    Plan  - Replete lytes with a goal of K>4, Mg >2  - Patient is anticoagulated, see medications listed above.  - Patient's afib is currently controlled  -       Type 2 diabetes mellitus, without long-term current use of insulin  Patient's FSGs are controlled on current medication regimen.  Last A1c reviewed-   Lab Results   Component Value Date    HGBA1C 6.0 (H) 07/08/2025     Most recent fingerstick glucose reviewed-   Recent Labs   Lab 07/11/25  2048 07/12/25  0656 07/12/25  1146 07/12/25  1636   POCTGLUCOSE 121* 97 109 147*     Current correctional scale  Medium  Maintain anti-hyperglycemic dose as follows-   Antihyperglycemics (From admission, onward)      Start     Stop Route Frequency Ordered    07/08/25 2023  insulin aspart U-100 pen 0-10 Units         -- SubQ Before meals & nightly PRN 07/08/25 1923          Hold Oral hypoglycemics while patient is in the hospital.      Hypokalemia  Patient's most recent potassium results are listed below.   Recent Labs      07/11/25  0426 07/11/25  1734 07/12/25  0355   K 2.6* 2.7* 3.9     Plan  - Replete potassium per protocol  - Monitor potassium Daily  - Patient's hypokalemia is improving  - repeat labs tonight  -obtain mag level  VTE Risk Mitigation (From admission, onward)           Ordered     rivaroxaban tablet 20 mg  With dinner         07/08/25 1920     IP VTE HIGH RISK PATIENT  Once         07/08/25 1901     Place sequential compression device  Until discontinued         07/08/25 1901                    Discharge Planning   ZEINAB:      Code Status: Full Code   Medical Readiness for Discharge Date:   Discharge Plan A: Skilled Nursing Facility                  Please place Justification for DME      Savanna Gomez MD  Department of Hospital Medicine   [facility] - Telemetry ([facility])

## 2025-07-13 LAB
ANION GAP (OHS): 13 MMOL/L (ref 8–16)
BUN SERPL-MCNC: 34 MG/DL (ref 8–23)
CALCIUM SERPL-MCNC: 9.5 MG/DL (ref 8.7–10.5)
CHLORIDE SERPL-SCNC: 82 MMOL/L (ref 95–110)
CO2 SERPL-SCNC: 39 MMOL/L (ref 23–29)
CREAT SERPL-MCNC: 1.1 MG/DL (ref 0.5–1.4)
GFR SERPLBLD CREATININE-BSD FMLA CKD-EPI: >60 ML/MIN/1.73/M2
GLUCOSE SERPL-MCNC: 105 MG/DL (ref 70–110)
OHS QRS DURATION: 106 MS
OHS QRS DURATION: 98 MS
OHS QTC CALCULATION: 511 MS
OHS QTC CALCULATION: 521 MS
POCT GLUCOSE: 110 MG/DL (ref 70–110)
POCT GLUCOSE: 133 MG/DL (ref 70–110)
POCT GLUCOSE: 142 MG/DL (ref 70–110)
POCT GLUCOSE: 160 MG/DL (ref 70–110)
POTASSIUM SERPL-SCNC: 2.9 MMOL/L (ref 3.5–5.1)
SODIUM SERPL-SCNC: 134 MMOL/L (ref 136–145)

## 2025-07-13 PROCEDURE — 25000003 PHARM REV CODE 250: Performed by: INTERNAL MEDICINE

## 2025-07-13 PROCEDURE — 21400001 HC TELEMETRY ROOM

## 2025-07-13 PROCEDURE — 82310 ASSAY OF CALCIUM: CPT | Performed by: INTERNAL MEDICINE

## 2025-07-13 PROCEDURE — 27000207 HC ISOLATION

## 2025-07-13 PROCEDURE — 63600175 PHARM REV CODE 636 W HCPCS: Performed by: INTERNAL MEDICINE

## 2025-07-13 PROCEDURE — 36415 COLL VENOUS BLD VENIPUNCTURE: CPT | Performed by: INTERNAL MEDICINE

## 2025-07-13 PROCEDURE — 25000003 PHARM REV CODE 250: Performed by: NURSE PRACTITIONER

## 2025-07-13 RX ORDER — FUROSEMIDE 20 MG/1
20 TABLET ORAL DAILY
Status: DISCONTINUED | OUTPATIENT
Start: 2025-07-14 | End: 2025-07-14 | Stop reason: HOSPADM

## 2025-07-13 RX ORDER — POTASSIUM CHLORIDE 20 MEQ/1
40 TABLET, EXTENDED RELEASE ORAL ONCE
Status: COMPLETED | OUTPATIENT
Start: 2025-07-13 | End: 2025-07-13

## 2025-07-13 RX ORDER — SPIRONOLACTONE 25 MG/1
25 TABLET ORAL DAILY
Status: DISCONTINUED | OUTPATIENT
Start: 2025-07-13 | End: 2025-07-14 | Stop reason: HOSPADM

## 2025-07-13 RX ORDER — HYDROXYZINE HYDROCHLORIDE 25 MG/1
25 TABLET, FILM COATED ORAL 3 TIMES DAILY PRN
Status: DISCONTINUED | OUTPATIENT
Start: 2025-07-13 | End: 2025-07-14 | Stop reason: HOSPADM

## 2025-07-13 RX ADMIN — SERTRALINE HYDROCHLORIDE 50 MG: 50 TABLET ORAL at 08:07

## 2025-07-13 RX ADMIN — ATORVASTATIN CALCIUM 40 MG: 40 TABLET, FILM COATED ORAL at 10:07

## 2025-07-13 RX ADMIN — AMIODARONE HYDROCHLORIDE 400 MG: 200 TABLET ORAL at 08:07

## 2025-07-13 RX ADMIN — SPIRONOLACTONE 25 MG: 25 TABLET, FILM COATED ORAL at 05:07

## 2025-07-13 RX ADMIN — ACETAMINOPHEN 650 MG: 325 TABLET ORAL at 10:07

## 2025-07-13 RX ADMIN — POTASSIUM CHLORIDE 40 MEQ: 1500 TABLET, EXTENDED RELEASE ORAL at 05:07

## 2025-07-13 RX ADMIN — INSULIN ASPART 2 UNITS: 100 INJECTION, SOLUTION INTRAVENOUS; SUBCUTANEOUS at 04:07

## 2025-07-13 RX ADMIN — THIAMINE HCL TAB 100 MG 100 MG: 100 TAB at 08:07

## 2025-07-13 RX ADMIN — HYDROXYZINE HYDROCHLORIDE 25 MG: 25 TABLET ORAL at 10:07

## 2025-07-13 RX ADMIN — POTASSIUM BICARBONATE 25 MEQ: 977.5 TABLET, EFFERVESCENT ORAL at 05:07

## 2025-07-13 RX ADMIN — RIVAROXABAN 20 MG: 20 TABLET, FILM COATED ORAL at 05:07

## 2025-07-13 RX ADMIN — HYDROCORTISONE 10 MG: 10 TABLET ORAL at 10:07

## 2025-07-13 RX ADMIN — HYDROCORTISONE 20 MG: 10 TABLET ORAL at 08:07

## 2025-07-13 RX ADMIN — PANTOPRAZOLE SODIUM 40 MG: 40 TABLET, DELAYED RELEASE ORAL at 08:07

## 2025-07-13 NOTE — ASSESSMENT & PLAN NOTE
Patient's most recent potassium results are listed below.   Recent Labs     07/11/25  1734 07/12/25  0355 07/13/25  0443   K 2.7* 3.9 2.9*     Plan  - Replete potassium per protocol  - Monitor potassium Daily

## 2025-07-13 NOTE — SUBJECTIVE & OBJECTIVE
Interval History: f/u CHF resumed oral diuretics, replace potassium patient sleeping in chair on rounds LE edema resolved    Review of Systems  Objective:     Vital Signs (Most Recent):  Temp: 98.4 °F (36.9 °C) (07/13/25 1555)  Pulse: 62 (07/13/25 1555)  Resp: 19 (07/13/25 1555)  BP: 100/62 (07/13/25 1555)  SpO2: 99 % (07/13/25 1555) Vital Signs (24h Range):  Temp:  [97.5 °F (36.4 °C)-98.4 °F (36.9 °C)] 98.4 °F (36.9 °C)  Pulse:  [50-75] 62  Resp:  [18-20] 19  SpO2:  [97 %-100 %] 99 %  BP: ()/(52-65) 100/62     Weight: 83.3 kg (183 lb 10.3 oz)  Body mass index is 30.56 kg/m².    Intake/Output Summary (Last 24 hours) at 7/13/2025 1654  Last data filed at 7/13/2025 1205  Gross per 24 hour   Intake --   Output 2900 ml   Net -2900 ml         Physical Exam  HENT:      Head: Normocephalic and atraumatic.   Cardiovascular:      Rate and Rhythm: Normal rate and regular rhythm.      Heart sounds: No murmur heard.  Pulmonary:      Effort: Pulmonary effort is normal. No respiratory distress.      Breath sounds: Normal breath sounds. No wheezing.   Abdominal:      General: Bowel sounds are normal. There is no distension.      Palpations: Abdomen is soft.      Tenderness: There is no abdominal tenderness.   Musculoskeletal:         General: No swelling.   Skin:     General: Skin is warm and dry.   Neurological:      Mental Status: He is alert and oriented to person, place, and time. Mental status is at baseline.               Significant Labs: All pertinent labs within the past 24 hours have been reviewed.  Recent Lab Results  (Last 5 results in the past 24 hours)        07/13/25  1613   07/13/25  1211   07/13/25  0515   07/13/25  0443   07/12/25  1938        Anion Gap       13         BUN       34         Calcium       9.5         Chloride       82         CO2       39         Creatinine       1.1         eGFR       >60  Comment: Estimated GFR calculated using the CKD-EPI creatinine (2021) equation.         Glucose        105         POCT Glucose 160   133   110     164       Potassium       2.9         Sodium       134                                Significant Imaging: I have reviewed all pertinent imaging results/findings within the past 24 hours.   Patient/Caregiver provided printed discharge information.

## 2025-07-13 NOTE — ASSESSMENT & PLAN NOTE
Patient's FSGs are controlled on current medication regimen.  Last A1c reviewed-   Lab Results   Component Value Date    HGBA1C 6.0 (H) 07/08/2025     Most recent fingerstick glucose reviewed-   Recent Labs   Lab 07/12/25  1938 07/13/25  0515 07/13/25  1211 07/13/25  1613   POCTGLUCOSE 164* 110 133* 160*     Current correctional scale  Medium  Maintain anti-hyperglycemic dose as follows-   Antihyperglycemics (From admission, onward)      Start     Stop Route Frequency Ordered    07/08/25 2023  insulin aspart U-100 pen 0-10 Units         -- SubQ Before meals & nightly PRN 07/08/25 1923          Hold Oral hypoglycemics while patient is in the hospital.

## 2025-07-13 NOTE — HOSPITAL COURSE
The patient presented with shortness of breath and BLE edema. He was placed on IV diuretics. Metolazone was added. Diuresis improved. Patient diuresed. His potassium was replaced. He was transitioned to oral diuretics on 7/13. He participated with therapy. Plan return to skilled facility tomorrow. On 7/14/2025, previous echocardiogram results from 05/23/2025 reviewed. Patient diuresed well with antihypertensive medications held due to soft BP. Pt asymptomatic with no signs of acute distress witnessed or reported prior to discharge.  Hemoglobin A1c of 6 noted.  Potassium replaced.  Patient seen and examined in deemed medically stable for discharge back to skilled nursing facility.  Case discussed with MD prior to discharge with medications reconciled.  Patient to follow up with PCP and Cardiology upon discharge for further evaluation.

## 2025-07-13 NOTE — PROGRESS NOTES
[facility] Telemetry [facility] Medicine  Progress Note    Patient Name: Ran Reyes Jr.  MRN: [id number]  Patient Class: IP- Inpatient   Admission Date: 7/8/2025  Length of Stay: 4 days  Attending Physician: Savanna Gomez MD  Primary Care Provider: Abiodun Recinos DO        Subjective     Principal Problem:CHF (congestive heart failure)        HPI:  The patient is a 68 yo male with past medical history of CHF, afib, depression and glaucoma who presented to the ED with shortness of breath and BLE edema. He reports started a week ago and has worsened. He was unable to lay flat. He has not slept well the last couple of nights. Workup in the ED revealed elevated BNP of 1837. He was given IV lasix in the ED. Hospital medicine was consulted. Patient placed in observation.     Overview/Hospital Course:  The patient presented with shortness of breath and BLE edema. He was placed on IV diuretics. Metolazone was added. Diuresis improved. Patient diuresed. His potassium was replaced. He was transitioned to oral diuretics on 7/13. He participated with therapy. Plan return to skilled facility tomorrow.     Interval History: f/u CHF resumed oral diuretics, replace potassium patient sleeping in chair on rounds LE edema resolved    Review of Systems  Objective:     Vital Signs (Most Recent):  Temp: 98.4 °F (36.9 °C) (07/13/25 1555)  Pulse: 62 (07/13/25 1555)  Resp: 19 (07/13/25 1555)  BP: 100/62 (07/13/25 1555)  SpO2: 99 % (07/13/25 1555) Vital Signs (24h Range):  Temp:  [97.5 °F (36.4 °C)-98.4 °F (36.9 °C)] 98.4 °F (36.9 °C)  Pulse:  [50-75] 62  Resp:  [18-20] 19  SpO2:  [97 %-100 %] 99 %  BP: ()/(52-65) 100/62     Weight: 83.3 kg (183 lb 10.3 oz)  Body mass index is 30.56 kg/m².    Intake/Output Summary (Last 24 hours) at 7/13/2025 1656  Last data filed at 7/13/2025 1205  Gross per 24 hour   Intake --   Output 2900 ml   Net -2900 ml         Physical Exam  HENT:      Head: Normocephalic and atraumatic.  "  Cardiovascular:      Rate and Rhythm: Normal rate and regular rhythm.      Heart sounds: No murmur heard.  Pulmonary:      Effort: Pulmonary effort is normal. No respiratory distress.      Breath sounds: Normal breath sounds. No wheezing.   Abdominal:      General: Bowel sounds are normal. There is no distension.      Palpations: Abdomen is soft.      Tenderness: There is no abdominal tenderness.   Musculoskeletal:         General: No swelling.   Skin:     General: Skin is warm and dry.   Neurological:      Mental Status: He is alert and oriented to person, place, and time. Mental status is at baseline.               Significant Labs: All pertinent labs within the past 24 hours have been reviewed.  Recent Lab Results  (Last 5 results in the past 24 hours)        07/13/25  1613   07/13/25  1211   07/13/25  0515   07/13/25  0443   07/12/25  1938        Anion Gap       13         BUN       34         Calcium       9.5         Chloride       82         CO2       39         Creatinine       1.1         eGFR       >60  Comment: Estimated GFR calculated using the CKD-EPI creatinine (2021) equation.         Glucose       105         POCT Glucose 160   133   110     164       Potassium       2.9         Sodium       134                                Significant Imaging: I have reviewed all pertinent imaging results/findings within the past 24 hours.      Assessment & Plan  CHF (congestive heart failure)  Patient has Combined Systolic and Diastolic heart failure that is Acute on chronic. On presentation their CHF was decompensated. Evidence of decompensated CHF on presentation includes: edema, paroxysmal nocturnal dyspnea (PND), dyspnea on exertion ([place]), and shortness of breath. The etiology of their decompensation is likely dietary indiscretion and increased fluid intake. Most recent BNP and echo results are listed below.  No results for input(s): "BNP" in the last 72 hours.    Latest ECHO  Results for orders placed " during the hospital encounter of 05/23/25    Echo Saline Bubble? No    Interpretation Summary    Left Ventricle: The left ventricle is moderately dilated. LVEDD 6.6 cm. There is severely reduced systolic function with a visually estimated ejection fraction of 20 - 25%. There is diastolic dysfunction.    Right Ventricle: Systolic function is mildly reduced.    Mitral Valve: There is moderate (2+) regurgitation.    Tricuspid Valve: There is mild to moderate (1-2+)regurgitation.    Pulmonary Artery: The estimated pulmonary artery systolic pressure is 53 mmHg.    Current Heart Failure Medications  spironolactone tablet 25 mg, Daily, Oral  furosemide tablet 20 mg, Daily, Oralfurosemide injection 80 mg, Every 12 hours, Intravenous     Plan  - Monitor strict I&Os and daily weights.    - Place on telemetry  - Low sodium diet  - Place on fluid restriction of 1.5 L.   - Cardiology has not been consulted  - The patient's volume status is being monitored          Chronic atrial fibrillation  Patient has permanent atrial fibrillation. Patient is currently in atrial fibrillation. [id number] Score: 3. The patients heart rate in the last 24 hours is as follows:  Pulse  Min: 50  Max: 75     Antiarrhythmics  amiodarone tablet 400 mg, Daily, Oral    Anticoagulants  rivaroxaban tablet 20 mg, With dinner, Oral    Plan  - Replete lytes with a goal of K>4, Mg >2  - Patient is anticoagulated, see medications listed above.  - Patient's afib is currently controlled  -       Type 2 diabetes mellitus, without long-term current use of insulin  Patient's FSGs are controlled on current medication regimen.  Last A1c reviewed-   Lab Results   Component Value Date    HGBA1C 6.0 (H) 07/08/2025     Most recent fingerstick glucose reviewed-   Recent Labs   Lab 07/12/25  1938 07/13/25  0515 07/13/25  1211 07/13/25  1613   POCTGLUCOSE 164* 110 133* 160*     Current correctional scale  Medium  Maintain anti-hyperglycemic dose as follows-    Antihyperglycemics (From admission, onward)      Start     Stop Route Frequency Ordered    07/08/25 2023  insulin aspart U-100 pen 0-10 Units         -- SubQ Before meals & nightly PRN 07/08/25 1923          Hold Oral hypoglycemics while patient is in the hospital.      Hypokalemia  Patient's most recent potassium results are listed below.   Recent Labs     07/11/25  1734 07/12/25  0355 07/13/25  0443   K 2.7* 3.9 2.9*     Plan  - Replete potassium per protocol  - Monitor potassium Daily    VTE Risk Mitigation (From admission, onward)           Ordered     rivaroxaban tablet 20 mg  With dinner         07/08/25 1920     IP VTE HIGH RISK PATIENT  Once         07/08/25 1901     Place sequential compression device  Until discontinued         07/08/25 1901                    Discharge Planning   ZEINAB:      Code Status: Full Code   Medical Readiness for Discharge Date:   Discharge Plan A: Skilled Nursing Facility                  Please place Justification for DME      Savanna Gomez MD  Department of Hospital Medicine   [facility] - Telemetry ([facility])

## 2025-07-13 NOTE — ASSESSMENT & PLAN NOTE
Patient has permanent atrial fibrillation. Patient is currently in atrial fibrillation. [id number] Score: 3. The patients heart rate in the last 24 hours is as follows:  Pulse  Min: 50  Max: 75     Antiarrhythmics  amiodarone tablet 400 mg, Daily, Oral    Anticoagulants  rivaroxaban tablet 20 mg, With dinner, Oral    Plan  - Replete lytes with a goal of K>4, Mg >2  - Patient is anticoagulated, see medications listed above.  - Patient's afib is currently controlled  -

## 2025-07-13 NOTE — ASSESSMENT & PLAN NOTE
"Patient has Combined Systolic and Diastolic heart failure that is Acute on chronic. On presentation their CHF was decompensated. Evidence of decompensated CHF on presentation includes: edema, paroxysmal nocturnal dyspnea (PND), dyspnea on exertion ([place]), and shortness of breath. The etiology of their decompensation is likely dietary indiscretion and increased fluid intake. Most recent BNP and echo results are listed below.  No results for input(s): "BNP" in the last 72 hours.    Latest ECHO  Results for orders placed during the hospital encounter of 05/23/25    Echo Saline Bubble? No    Interpretation Summary    Left Ventricle: The left ventricle is moderately dilated. LVEDD 6.6 cm. There is severely reduced systolic function with a visually estimated ejection fraction of 20 - 25%. There is diastolic dysfunction.    Right Ventricle: Systolic function is mildly reduced.    Mitral Valve: There is moderate (2+) regurgitation.    Tricuspid Valve: There is mild to moderate (1-2+)regurgitation.    Pulmonary Artery: The estimated pulmonary artery systolic pressure is 53 mmHg.    Current Heart Failure Medications  spironolactone tablet 25 mg, Daily, Oral  furosemide tablet 20 mg, Daily, Oralfurosemide injection 80 mg, Every 12 hours, Intravenous     Plan  - Monitor strict I&Os and daily weights.    - Place on telemetry  - Low sodium diet  - Place on fluid restriction of 1.5 L.   - Cardiology has not been consulted  - The patient's volume status is being monitored          "

## 2025-07-13 NOTE — PLAN OF CARE
A214/A214 HILARY Reyes Jr. is a 69 y.o.male admitted on 7/8/2025 for CHF (congestive heart failure)   Code Status: Full Code MRN: [id number]   Review of patient's allergies indicates:  No Known Allergies  Past Medical History:   Diagnosis Date    A-fib     Anticoagulant long-term use     Anxiety     Aortic aneurysm     Cataract     CHF (congestive heart failure)     Chronic atrial fibrillation     COPD (chronic obstructive pulmonary disease)     Coronary artery disease     Depression     HLD (hyperlipidemia)     Hypertension     Mitral regurgitation     PAD (peripheral artery disease)     Primary open angle glaucoma (POAG)       PRN meds    acetaminophen, 650 mg, Q6H PRN  albuterol-ipratropium, 3 mL, Q4H PRN  dextrose 50%, 12.5 g, PRN  dextrose 50%, 25 g, PRN  glucagon (human recombinant), 1 mg, PRN  glucose, 16 g, PRN  glucose, 24 g, PRN  hydrOXYzine HCL, 25 mg, TID PRN  insulin aspart U-100, 0-10 Units, QID (AC + HS) PRN  melatonin, 6 mg, Nightly PRN  ondansetron, 4 mg, Q6H PRN  senna-docusate, 1 tablet, Daily PRN  sodium chloride 0.9%, 10 mL, PRN      Chart check completed. Will continue plan of care.         Nikunj Coma Scale Score: 15     Lead Monitored: Lead II Rhythm: atrial rhythm Frequency/Ectopy: rare, PVCs  Cardiac/Telemetry Box Number: [id number]  VTE Core Measure: Pharmacological prophylaxis initiated/maintained Last Bowel Movement: 07/10/25  Diet Low Sodium (2 gm) Double Portions; Fluid - 1500mL; Standard Tray  Voiding Characteristics: frequency (DIURETICS)  Paddy Score: 18  Fall Risk Score: 13  Accucheck []   Freq?      Lines/Drains/Airways       Peripheral Intravenous Line  Duration             Peripheral IV Single Lumen 07/11/25 2030 18 G Left;Posterior Forearm 1 day

## 2025-07-13 NOTE — PLAN OF CARE
POC reviewed w/ patient. Pt verbalizes understanding of plan  Chart/Orders reviewed  All safety precautions in place; No falls this shift  Pt resting in bed  Pain controlled  Continuous rounding c bed in lowest position, side rails up, call light in reach  Will continue to monitor until the end of shift  Problem: Adult Inpatient Plan of Care  Goal: Plan of Care Review  Outcome: Progressing  Flowsheets (Taken 7/13/2025 0311)  Plan of Care Reviewed With: patient  Goal: Patient-Specific Goal (Individualized)  Outcome: Progressing  Flowsheets (Taken 7/13/2025 0311)  Individualized Care Needs: none  Anxieties, Fears or Concerns: none  Patient/Family-Specific Goals (Include Timeframe): none  Goal: Absence of Hospital-Acquired Illness or Injury  Outcome: Progressing  Goal: Optimal Comfort and Wellbeing  Outcome: Progressing

## 2025-07-14 VITALS
WEIGHT: 180.75 LBS | HEART RATE: 63 BPM | RESPIRATION RATE: 18 BRPM | HEIGHT: 65 IN | TEMPERATURE: 98 F | BODY MASS INDEX: 30.12 KG/M2 | OXYGEN SATURATION: 99 % | SYSTOLIC BLOOD PRESSURE: 106 MMHG | DIASTOLIC BLOOD PRESSURE: 73 MMHG

## 2025-07-14 LAB
ABSOLUTE EOSINOPHIL (OHS): 0.04 K/UL
ABSOLUTE MONOCYTE (OHS): 0.8 K/UL (ref 0.3–1)
ABSOLUTE NEUTROPHIL COUNT (OHS): 5.01 K/UL (ref 1.8–7.7)
ACANTHOCYTES BLD QL SMEAR: PRESENT
ANION GAP (OHS): 14 MMOL/L (ref 8–16)
ANISOCYTOSIS BLD QL SMEAR: NORMAL
BASOPHILS # BLD AUTO: 0.04 K/UL
BASOPHILS NFR BLD AUTO: 0.6 %
BUN SERPL-MCNC: 30 MG/DL (ref 8–23)
CALCIUM SERPL-MCNC: 9.6 MG/DL (ref 8.7–10.5)
CHLORIDE SERPL-SCNC: 87 MMOL/L (ref 95–110)
CO2 SERPL-SCNC: 31 MMOL/L (ref 23–29)
CREAT SERPL-MCNC: 1 MG/DL (ref 0.5–1.4)
DACRYOCYTES BLD QL SMEAR: NORMAL
ERYTHROCYTE [DISTWIDTH] IN BLOOD BY AUTOMATED COUNT: 25.6 % (ref 11.5–14.5)
GFR SERPLBLD CREATININE-BSD FMLA CKD-EPI: >60 ML/MIN/1.73/M2
GLUCOSE SERPL-MCNC: 108 MG/DL (ref 70–110)
HCT VFR BLD AUTO: 27 % (ref 40–54)
HGB BLD-MCNC: 8.4 GM/DL (ref 14–18)
HYPOCHROMIA BLD QL SMEAR: NORMAL
IMM GRANULOCYTES # BLD AUTO: 0.04 K/UL (ref 0–0.04)
IMM GRANULOCYTES NFR BLD AUTO: 0.6 % (ref 0–0.5)
LARGE/GIANT PLATELETS (OHS): PRESENT
LYMPHOCYTES # BLD AUTO: 1.31 K/UL (ref 1–4.8)
MCH RBC QN AUTO: 27.1 PG (ref 27–31)
MCHC RBC AUTO-ENTMCNC: 31.1 G/DL (ref 32–36)
MCV RBC AUTO: 87 FL (ref 82–98)
NUCLEATED RBC (/100WBC) (OHS): 0 /100 WBC
OVALOCYTES BLD QL SMEAR: NORMAL
PLATELET # BLD AUTO: 238 K/UL (ref 150–450)
PLATELET BLD QL SMEAR: NORMAL
PMV BLD AUTO: 10.7 FL (ref 9.2–12.9)
POCT GLUCOSE: 109 MG/DL (ref 70–110)
POCT GLUCOSE: 111 MG/DL (ref 70–110)
POCT GLUCOSE: 195 MG/DL (ref 70–110)
POIKILOCYTOSIS BLD QL SMEAR: NORMAL
POLYCHROMASIA BLD QL SMEAR: NORMAL
POTASSIUM SERPL-SCNC: 3.4 MMOL/L (ref 3.5–5.1)
RBC # BLD AUTO: 3.1 M/UL (ref 4.6–6.2)
RELATIVE EOSINOPHIL (OHS): 0.6 %
RELATIVE LYMPHOCYTE (OHS): 18.1 % (ref 18–48)
RELATIVE MONOCYTE (OHS): 11 % (ref 4–15)
RELATIVE NEUTROPHIL (OHS): 69.1 % (ref 38–73)
SCHISTOCYTES BLD QL SMEAR: PRESENT
SODIUM SERPL-SCNC: 132 MMOL/L (ref 136–145)
WBC # BLD AUTO: 7.24 K/UL (ref 3.9–12.7)

## 2025-07-14 PROCEDURE — 80048 BASIC METABOLIC PNL TOTAL CA: CPT | Performed by: INTERNAL MEDICINE

## 2025-07-14 PROCEDURE — 85025 COMPLETE CBC W/AUTO DIFF WBC: CPT | Performed by: INTERNAL MEDICINE

## 2025-07-14 PROCEDURE — 90677 PCV20 VACCINE IM: CPT | Performed by: STUDENT IN AN ORGANIZED HEALTH CARE EDUCATION/TRAINING PROGRAM

## 2025-07-14 PROCEDURE — 25000003 PHARM REV CODE 250: Performed by: NURSE PRACTITIONER

## 2025-07-14 PROCEDURE — 99900035 HC TECH TIME PER 15 MIN (STAT)

## 2025-07-14 PROCEDURE — 36415 COLL VENOUS BLD VENIPUNCTURE: CPT | Performed by: INTERNAL MEDICINE

## 2025-07-14 PROCEDURE — 25000003 PHARM REV CODE 250: Performed by: INTERNAL MEDICINE

## 2025-07-14 PROCEDURE — 97530 THERAPEUTIC ACTIVITIES: CPT

## 2025-07-14 PROCEDURE — 97535 SELF CARE MNGMENT TRAINING: CPT

## 2025-07-14 PROCEDURE — 63600175 PHARM REV CODE 636 W HCPCS: Performed by: INTERNAL MEDICINE

## 2025-07-14 PROCEDURE — 94761 N-INVAS EAR/PLS OXIMETRY MLT: CPT

## 2025-07-14 PROCEDURE — 90471 IMMUNIZATION ADMIN: CPT | Performed by: STUDENT IN AN ORGANIZED HEALTH CARE EDUCATION/TRAINING PROGRAM

## 2025-07-14 PROCEDURE — 97116 GAIT TRAINING THERAPY: CPT

## 2025-07-14 PROCEDURE — 63600175 PHARM REV CODE 636 W HCPCS: Performed by: STUDENT IN AN ORGANIZED HEALTH CARE EDUCATION/TRAINING PROGRAM

## 2025-07-14 PROCEDURE — 27000221 HC OXYGEN, UP TO 24 HOURS

## 2025-07-14 PROCEDURE — G0009 ADMIN PNEUMOCOCCAL VACCINE: HCPCS | Performed by: STUDENT IN AN ORGANIZED HEALTH CARE EDUCATION/TRAINING PROGRAM

## 2025-07-14 RX ORDER — HYDROCORTISONE 10 MG/1
10 TABLET ORAL NIGHTLY
Start: 2025-07-14 | End: 2025-08-13

## 2025-07-14 RX ORDER — HYDROCORTISONE 10 MG/1
10 TABLET ORAL NIGHTLY
Start: 2025-07-14 | End: 2025-07-14

## 2025-07-14 RX ORDER — SPIRONOLACTONE 25 MG/1
25 TABLET ORAL DAILY
Start: 2025-07-15 | End: 2026-07-15

## 2025-07-14 RX ORDER — LANOLIN ALCOHOL/MO/W.PET/CERES
100 CREAM (GRAM) TOPICAL DAILY
Start: 2025-07-15 | End: 2025-08-14

## 2025-07-14 RX ORDER — AMIODARONE HYDROCHLORIDE 200 MG/1
200 TABLET ORAL 2 TIMES DAILY
Start: 2025-07-14 | End: 2025-08-13

## 2025-07-14 RX ORDER — AMOXICILLIN 250 MG
1 CAPSULE ORAL DAILY PRN
Start: 2025-07-14 | End: 2025-07-14

## 2025-07-14 RX ORDER — SERTRALINE HYDROCHLORIDE 50 MG/1
50 TABLET, FILM COATED ORAL DAILY
Start: 2025-07-15 | End: 2025-08-14

## 2025-07-14 RX ORDER — ATORVASTATIN CALCIUM 40 MG/1
40 TABLET, FILM COATED ORAL NIGHTLY
Qty: 30 TABLET | Refills: 0 | Status: SHIPPED | OUTPATIENT
Start: 2025-07-14 | End: 2025-07-14

## 2025-07-14 RX ORDER — ATORVASTATIN CALCIUM 40 MG/1
40 TABLET, FILM COATED ORAL NIGHTLY
Start: 2025-07-14 | End: 2025-08-13

## 2025-07-14 RX ORDER — PANTOPRAZOLE SODIUM 40 MG/1
40 TABLET, DELAYED RELEASE ORAL DAILY
Start: 2025-07-15 | End: 2025-08-14

## 2025-07-14 RX ORDER — POTASSIUM CHLORIDE 20 MEQ/1
40 TABLET, EXTENDED RELEASE ORAL ONCE
Status: COMPLETED | OUTPATIENT
Start: 2025-07-14 | End: 2025-07-14

## 2025-07-14 RX ORDER — HYDROCORTISONE 20 MG/1
20 TABLET ORAL DAILY
Start: 2025-07-15 | End: 2025-07-14

## 2025-07-14 RX ORDER — FUROSEMIDE 20 MG/1
20 TABLET ORAL DAILY
Start: 2025-07-15 | End: 2025-08-14

## 2025-07-14 RX ORDER — ATORVASTATIN CALCIUM 40 MG/1
40 TABLET, FILM COATED ORAL NIGHTLY
Start: 2025-07-14 | End: 2025-07-14

## 2025-07-14 RX ORDER — LANOLIN ALCOHOL/MO/W.PET/CERES
100 CREAM (GRAM) TOPICAL DAILY
Start: 2025-07-15 | End: 2025-07-14

## 2025-07-14 RX ORDER — FUROSEMIDE 20 MG/1
20 TABLET ORAL DAILY
Qty: 30 TABLET | Refills: 0 | Status: SHIPPED | OUTPATIENT
Start: 2025-07-15 | End: 2025-07-14

## 2025-07-14 RX ORDER — PANTOPRAZOLE SODIUM 40 MG/1
40 TABLET, DELAYED RELEASE ORAL DAILY
Start: 2025-07-15 | End: 2025-07-14

## 2025-07-14 RX ORDER — HYDROCORTISONE 20 MG/1
20 TABLET ORAL DAILY
Start: 2025-07-15 | End: 2025-08-14

## 2025-07-14 RX ORDER — SPIRONOLACTONE 25 MG/1
25 TABLET ORAL DAILY
Start: 2025-07-15 | End: 2025-07-14

## 2025-07-14 RX ORDER — AMOXICILLIN 250 MG
1 CAPSULE ORAL DAILY PRN
Start: 2025-07-14 | End: 2025-07-24

## 2025-07-14 RX ADMIN — INSULIN ASPART 2 UNITS: 100 INJECTION, SOLUTION INTRAVENOUS; SUBCUTANEOUS at 05:07

## 2025-07-14 RX ADMIN — SERTRALINE HYDROCHLORIDE 50 MG: 50 TABLET ORAL at 08:07

## 2025-07-14 RX ADMIN — POTASSIUM CHLORIDE 40 MEQ: 1500 TABLET, EXTENDED RELEASE ORAL at 05:07

## 2025-07-14 RX ADMIN — RIVAROXABAN 20 MG: 20 TABLET, FILM COATED ORAL at 05:07

## 2025-07-14 RX ADMIN — PNEUMOCOCCAL 20-VALENT CONJUGATE VACCINE 0.5 ML
2.2; 2.2; 2.2; 2.2; 2.2; 2.2; 2.2; 2.2; 2.2; 2.2; 2.2; 2.2; 2.2; 2.2; 2.2; 2.2; 4.4; 2.2; 2.2; 2.2 INJECTION, SUSPENSION INTRAMUSCULAR at 05:07

## 2025-07-14 RX ADMIN — PANTOPRAZOLE SODIUM 40 MG: 40 TABLET, DELAYED RELEASE ORAL at 08:07

## 2025-07-14 RX ADMIN — THIAMINE HCL TAB 100 MG 100 MG: 100 TAB at 08:07

## 2025-07-14 RX ADMIN — HYDROCORTISONE 20 MG: 10 TABLET ORAL at 08:07

## 2025-07-14 NOTE — PLAN OF CARE
YENNY attempted to contact Jami, at [facility], regarding patient being medically ready to DC back to [facility].  Jami did not answer and YENNY attempted to contact [facility] and speak to admissions. [facility] admissions did not answer either.    YENNY uploaded AVS, DC orders, and referral to [facility].  Pending response.     15 42 YENNY spoke to Kd, with [facility], who stated he would get with Admissions to pull information needed for Patient to return today.     16 09 YENNY spoke to Jami, who provided Lopez, , with [facility], phone number and fax number.   SW faxed DC orders, AVS, and MAR to [facility].     16 10 Per Kd, with [facility], they are reviewing patient's DC information and can give number for nurse report once everything is reviewed.     YENNY will continue to follow and assist as needed.

## 2025-07-14 NOTE — PLAN OF CARE
POC reviewed w/ patient. Pt verbalizes understanding of plan  Chart/Orders reviewed  All safety precautions in place; No falls this shift  Pt resting in bed  Pain controlled  Continuous rounding c bed in lowest position, side rails up, call light in reach  Will continue to monitor until the end of shift  Problem: Adult Inpatient Plan of Care  Goal: Plan of Care Review  Outcome: Progressing  Flowsheets (Taken 7/14/2025 3559)  Plan of Care Reviewed With: patient  Goal: Patient-Specific Goal (Individualized)  Outcome: Progressing  Goal: Absence of Hospital-Acquired Illness or Injury  Outcome: Progressing  Goal: Optimal Comfort and Wellbeing  Outcome: Progressing  Goal: Readiness for Transition of Care  Outcome: Progressing

## 2025-07-14 NOTE — ASSESSMENT & PLAN NOTE
Patient has permanent atrial fibrillation. Patient is currently in atrial fibrillation. [id number] Score: 3. The patients heart rate in the last 24 hours is as follows:  Pulse  Min: 46  Max: 85     Antiarrhythmics  amiodarone tablet 400 mg, Daily, Oral    Anticoagulants  rivaroxaban tablet 20 mg, With dinner, Oral    Plan  - Replete lytes with a goal of K>4, Mg >2  - Patient is anticoagulated, see medications listed above.  - Patient's afib is currently controlled  -

## 2025-07-14 NOTE — DISCHARGE SUMMARY
[facility] Medicine  Discharge Summary      Patient Name: Ran Reyes Jr.  MRN: [id number]  [facility]: [id number]  Patient Class: IP- Inpatient  Admission Date: 7/8/2025  Hospital Length of Stay: 5 days  Discharge Date and Time: 7/14/2025 06:22 PM  Attending Physician:  Dr. Troy Nieto  Discharging Provider: Mandy Elise NP  Primary Care Provider: Abiodun Recinos DO    Primary Care Team: Networked reference to record PCT     HPI:   The patient is a 68 yo male with past medical history of CHF, afib, depression and glaucoma who presented to the ED with shortness of breath and BLE edema. He reports started a week ago and has worsened. He was unable to lay flat. He has not slept well the last couple of nights. Workup in the ED revealed elevated BNP of 1837. He was given IV lasix in the ED. Hospital medicine was consulted. Patient placed in observation.     * No surgery found *      Hospital Course:   The patient presented with shortness of breath and BLE edema. He was placed on IV diuretics. Metolazone was added. Diuresis improved. Patient diuresed. His potassium was replaced. He was transitioned to oral diuretics on 7/13. He participated with therapy. Plan return to skilled facility tomorrow. On 7/14/2025, previous echocardiogram results from 05/23/2025 reviewed. Patient diuresed well with antihypertensive medications held due to soft BP. Pt asymptomatic with no signs of acute distress witnessed or reported prior to discharge.  Hemoglobin A1c of 6 noted.  No signs of acute distress witnessed or reported prior to discharge.  Patient seen and examined in deemed medically stable for discharge back to skilled nursing facility.  Case discussed with MD prior to discharge with medications reconciled. Patient to follow up with PCP and Cardiology upon discharge for further evaluation.    Goals of Care Treatment Preferences:  Code Status: Full Code         Consults:   Consults (From admission, onward)           Status Ordering Provider     Inpatient consult to Social Work/Case Management  Once        Provider:  (Not yet assigned)    Completed JUDITH PERRY     Inpatient consult to Social Work/Case Management  Once        Provider:  (Not yet assigned)    Completed RAMÍREZ MOLINA     Inpatient consult to Registered Dietitian/Nutritionist  Once        Provider:  (Not yet assigned)    Completed RAMÍREZ MOLINA            Assessment & Plan      Final Active Diagnoses:    Diagnosis Date Noted POA    PRINCIPAL PROBLEM:  CHF (congestive heart failure) [I50.9] 12/05/2024 Yes    Type 2 diabetes mellitus, without long-term current use of insulin [E11.9] 07/09/2025 Yes    Hypokalemia [E87.6] 05/30/2025 Yes    Chronic atrial fibrillation [I48.20] 06/02/2022 Yes      Problems Resolved During this Admission:       Discharged Condition: stable    Disposition: Skilled Nursing Facility    Follow Up:   Follow-up Information       Abiodun Recinos DO Follow up in 2 week(s).    Specialty: Internal Medicine  Why: -hospital follow up upon discharge from SNF  Contact information:  [address] [facility] [address]  [phone]               Praveen Gallegos MD Follow up in 2 week(s).    Specialty: Cardiology  Why: -hospital follow up for end stage CHF and cardiomyopathy  Contact information:  [address] [place] [id number]  [phone]                           Patient Instructions:      Ambulatory referral/consult to [facility]   Standing Status: Future   Referral Priority: Routine Referral Type: Consultation   Referral Reason: Specialty Services Required   Requested Specialty: Cardiology   Number of Visits Requested: 1     Diet Cardiac   Order Comments: 1500 mL daily fluid restriction     Call MD for:  temperature >100.4     Call MD for:  persistent nausea and vomiting or diarrhea     Call MD for:  severe uncontrolled pain     Call MD for:  redness, tenderness, or signs of infection (pain,  swelling, redness, odor or green/yellow discharge around incision site)     Call MD for:  difficulty breathing or increased cough     Call MD for:  severe persistent headache     Call MD for:  worsening rash     Call MD for:  persistent dizziness, light-headedness, or visual disturbances     Call MD for:  increased confusion or weakness       Significant Diagnostic Studies: N/A    Pending Diagnostic Studies:       None           Medications:  Reconciled Home Medications:      Medication List        START taking these medications      amiodarone 200 MG Tab  Commonly known as: PACERONE  Take 1 tablet (200 mg total) by mouth 2 (two) times daily.     atorvastatin 40 MG tablet  Commonly known as: LIPITOR  Take 1 tablet (40 mg total) by mouth every evening.     furosemide 20 MG tablet  Commonly known as: LASIX  Take 1 tablet (20 mg total) by mouth once daily.     * hydrocortisone 10 MG Tab  Commonly known as: CORTEF  Take 1 tablet (10 mg total) by mouth nightly.     * hydrocortisone 20 MG Tab  Commonly known as: CORTEF  Take 1 tablet (20 mg total) by mouth once daily.     pantoprazole 40 MG tablet  Commonly known as: PROTONIX  Take 1 tablet (40 mg total) by mouth once daily.     rivaroxaban 20 mg Tab  Commonly known as: XARELTO  Take 1 tablet (20 mg total) by mouth daily with dinner or evening meal.     senna-docusate 8.6-50 mg per tablet  Commonly known as: PERICOLACE  Take 1 tablet by mouth daily as needed for Constipation.     sertraline 50 MG tablet  Commonly known as: ZOLOFT  Take 1 tablet (50 mg total) by mouth once daily.     spironolactone 25 MG tablet  Commonly known as: ALDACTONE  Take 1 tablet (25 mg total) by mouth once daily.     thiamine 100 MG tablet  Take 1 tablet (100 mg total) by mouth once daily.           * This list has 2 medication(s) that are the same as other medications prescribed for you. Read the directions carefully, and ask your doctor or other care provider to review them with you.                   Indwelling Lines/Drains at time of discharge:   Lines/Drains/Airways       None                       Time spent on the discharge of patient: > 35  minutes         Mandy Elise NP  Department of Hospital Medicine  [facility] - Telemetry ([facility])

## 2025-07-14 NOTE — PT/OT/SLP PROGRESS
Physical Therapy Treatment    Patient Name:  Ran Reyes Jr.   MRN:  [id number]    Recommendations:     Discharge Recommendations: Moderate Intensity Therapy  Discharge Equipment Recommendations: to be determined by next level of care  Barriers to discharge: Inaccessible home and Decreased caregiver support    Assessment:     Ran Reyes Jr. is a 69 y.o. male admitted with a medical diagnosis of CHF (congestive heart failure).  He presents with the following impairments/functional limitations: weakness, impaired endurance, impaired self care skills, impaired functional mobility, gait instability, impaired balance, decreased upper extremity function, decreased lower extremity function.    Rehab Prognosis: Fair; patient would benefit from acute skilled PT services to address these deficits and reach maximum level of function.    Recent Surgery: * No surgery found *      Plan:     During this hospitalization, patient to be seen 3 x/week to address the identified rehab impairments via gait training, therapeutic activities, therapeutic exercises and progress toward the following goals:    Plan of Care Expires:  07/23/25    Subjective     Chief Complaint: None stated by patient  Patient/Family Comments/goals: Patient is agreeable to therapy session  Pain/Comfort:  Pain Rating 1: 0/10      Objective:     Communicated with [facility] prior to session.  Patient found HOB elevated with telemetry, peripheral IV, oxygen upon PT entry to room.     General Precautions: Standard, fall  Orthopedic Precautions: N/A  Braces: N/A  Respiratory Status: Nasal cannula, flow 2 L/min     Functional Mobility:  Bed Mobility: SBA  Sup to sit:SBA  Seated scoot: SBA  Transfers: MIN A to CGA  Sit to stand with RW from EOB: MIN A  Static stand with RW and CGA x 3 min for brief change and cleaning patient: CGA.  Patient required a sitting rest break following static stand   Sit to stand with RW from EOB to initiate gait trial: CGA  Gait:  Patient  "ambulated 60' x 2 with RW and CGA  Patient required one sitting rest break care home through gait trial because patient is quick to fatigue  Patient demonstrated decreased heel strike during gait   Step transfer to chair with RW and CGA  Balance: Patient demonstrates good sitting balance and fair standing balance  Patient required a total of 2 rest breaks during today's session due to c/o fatgiue      AM-PAC 6 CLICK MOBILITY  Turning over in bed (including adjusting bedclothes, sheets and blankets)?: 4  Sitting down on and standing up from a chair with arms (e.g., wheelchair, bedside commode, etc.): 3  Moving from lying on back to sitting on the side of the bed?: 4  Moving to and from a bed to a chair (including a wheelchair)?: 3  Need to walk in hospital room?: 3  Climbing 3-5 steps with a railing?: 1 (NT)  Basic Mobility Total Score: 18       Education:  Role of PT and POC in acute care hospital setting  RW use and safety during transfers and gait  Continue provided therapeutic exercises throughout the day to increase tolerance to activity and blood flow: 10 repetitions of seated hip flexion, LAQs, heel slides, and APs  Increase amount of time out of bed and seated in chair to patient tolerance  Patient was educated on risk for falls due to generalized weakness ("Call don't Fall").  Patient was encouraged to call for assistance with all needs, such as transfers or trips to the bathroom.  Patient was agreeable to all safety precautions.       Patient left up in chair with all lines intact, call button in reach, and chair alarm on..    GOALS:   Multidisciplinary Problems       Physical Therapy Goals          Problem: Physical Therapy    Goal Priority Disciplines Outcome Interventions   Physical Therapy Goal     PT, PT/OT Ongoing    Description: Pt will perform bed mobility independently in order to participate in EOB activity.  Pt will perform transfers with independently in order to participate in OOB activity.  Pt " will ambulate 350 ft SPV with LRAD in order to participate in daily tasks.   Pt will improve functional status as evidenced by 2 pt increase in AMPAC score.                        DME Justifications:  No DME recommended requiring DME justifications    Time Tracking:     PT Received On: 07/14/25  PT Start Time: 0845     PT Stop Time: 0915  PT Total Time (min): 30 min     Billable Minutes: Gait Training 15 and Therapeutic Activity 15    Treatment Type: Treatment  PT/PTA: PT     Number of PTA visits since last PT visit: 0     07/14/2025

## 2025-07-14 NOTE — PLAN OF CARE
Pt tolerated therapy well. Pt completed bed mobility SBA, STS /c RW CGA (3 total throughout session), and functional mobility 60' x2 CGA /c RW. Session heavily ADL focused. Recommending moderate intensity intervention therapy at d/c.

## 2025-07-14 NOTE — PT/OT/SLP PROGRESS
Occupational Therapy   Treatment    Name: Ran Reyes Jr.  MRN: [id number]  Admitting Diagnosis:  CHF (congestive heart failure)       Recommendations:     Discharge Recommendations: Moderate Intensity Therapy  Discharge Equipment Recommendations:  to be determined by next level of care  Barriers to discharge:  Inaccessible home environment, Decreased caregiver support    Assessment:     Rna Reyes Jr. is a 69 y.o. male with a medical diagnosis of CHF (congestive heart failure).  He presents with performance deficits affecting function are weakness, impaired endurance, impaired self care skills, impaired functional mobility, gait instability, impaired balance, decreased upper extremity function, decreased lower extremity function, pain, decreased coordination.     Rehab Prognosis:  Good; patient would benefit from acute skilled OT services to address these deficits and reach maximum level of function.       Plan:     Patient to be seen 2 x/week to address the above listed problems via self-care/home management, therapeutic activities, therapeutic exercises  Plan of Care Expires: 07/23/25  Plan of Care Reviewed with: patient    Subjective     Chief Complaint: Pt states no complaints this AM upon entry into room but during functional mobility pt states his LB is giving him some pain. Pt did not rate.   Patient/Family Comments/goals: None reported.   Pain/Comfort:  Pain Rating 1: 0/10    Objective:   EPIC chart review completed prior to session.  Patient found HOB elevated with peripheral IV, telemetry, oxygen upon OT entry to room.    General Precautions: Standard, fall    Orthopedic Precautions:N/A  Braces: N/A  Respiratory Status: Nasal cannula, flow 2 L/min     Occupational Performance:   Bed Mobility:    Patient completed Rolling/Turning to Left with  stand by assistance  Patient completed Scooting/Bridging with stand by assistance  Patient completed Supine to Sit with stand by assistance     Functional  Mobility/Transfers:  Patient completed Sit <> Stand Transfer with minimum assistance  with  rolling walker   For brief change and clean up. Pt found soiled upon OT entry into room.  Patient completed Sit <> Stand Transfer with contact guard assistance  with  rolling walker   Completed 2 STS after rest breaks during session  Patient completed Stand <> Chair Transfer using Step Transfer technique with contact guard assistance with rolling walker  Functional Mobility: Pt ambulated 60' x2 CGA /c RW for increased activity tolerance and ADL completion. Pt required 1 sitting rest break in the hallway. Pt also c/o LB pain during ambulation but did not rate.     Activities of Daily Living:  Grooming:    Pt completed self grooming /c wipes Min A due to being soiled upon entry into room. Pt completed this seated EOB (thighs) and standing /c RW (groin). Pt was able to clean anterior side of body but needed A /c posterior side.   Pt completed facial wiping set up seated in chair  Pt completed oral care set up seated in chair  Lower Body Dressing:    Pt required Mod A for brief change in standing /c RW. Pt able to A therapist and hold up brief partially  Pt required sitting rest break EOB after due to fatigue   Upper body dressing:  Completed Min A for lines      [facility] 6 Click ADL: 19    Treatment & Education:  Pt educated on OT roles and responsibilities. Pt educated on importance of OOB activity and UE ROM exercises for continued CV endurance, mobility, and strengthening. Pt educated on call don't fall for safety. Pt agreeable to POC.    Patient left up in chair with all lines intact, call button in reach, and chair alarm on    GOALS:   Multidisciplinary Problems       Occupational Therapy Goals          Problem: Occupational Therapy    Goal Priority Disciplines Outcome Interventions   Occupational Therapy Goal     OT, PT/OT Progressing    Description: Goals to be met by: 7/23/25     Patient will increase functional  independence with ADLs by performing:    LE Dressing with Stand-by Assistance.  Grooming while standing at sink with Stand-by Assistance.  Toileting from toilet with Stand-by Assistance for hygiene and clothing management.   Toilet transfer to toilet with Stand-by Assistance.                         DME Justifications:  TBD at next level of care.     Time Tracking:     OT Date of Treatment: 07/14/25  OT Start Time: 0905  OT Stop Time: 0935  OT Total Time (min): 30 min    Billable Minutes:Self Care/Home Management 20  Therapeutic Activity 10    OT/ALLAN: OT     Number of ALLAN visits since last OT visit: 0  AUBREE Villarreal  7/14/2025

## 2025-07-14 NOTE — ASSESSMENT & PLAN NOTE
Patient's most recent potassium results are listed below.   Recent Labs     07/12/25  0355 07/13/25  0443 07/14/25  0425   K 3.9 2.9* 3.4*     Plan  - Replete potassium per protocol  - Monitor potassium Daily

## 2025-07-14 NOTE — NURSING
Report called and given to RACHEL Monae at [facility]. No further questions. Wheelchair van will be here to  pt. Iv out and intact. All belongings with pt.

## 2025-07-14 NOTE — PLAN OF CARE
Patient tolerated therapy well today. Patient required SBA for bed mobility, MIN A to CGA for STS with RW, and CGA for 60' x 2 with RW. Patient is quick to fatigue and required one sitting rest break during gait trial. PT continues to recommend MOD intensity therapy upon d/c.

## 2025-07-14 NOTE — ASSESSMENT & PLAN NOTE
Patient's FSGs are controlled on current medication regimen.  Last A1c reviewed-   Lab Results   Component Value Date    HGBA1C 6.0 (H) 07/08/2025     Most recent fingerstick glucose reviewed-   Recent Labs   Lab 07/13/25  1613 07/13/25  2203 07/14/25  0531 07/14/25  1123   POCTGLUCOSE 160* 142* 111* 109     Current correctional scale  Medium  Maintain anti-hyperglycemic dose as follows-   Antihyperglycemics (From admission, onward)      Start     Stop Route Frequency Ordered    07/08/25 2023  insulin aspart U-100 pen 0-10 Units         -- SubQ Before meals & nightly PRN 07/08/25 1923          Hold Oral hypoglycemics while patient is in the hospital.

## 2025-07-14 NOTE — PLAN OF CARE
Problem: Skin Injury Risk Increased  Goal: Skin Health and Integrity  Outcome: Met     Problem: Adult Inpatient Plan of Care  Goal: Plan of Care Review  Outcome: Met  Goal: Patient-Specific Goal (Individualized)  Outcome: Met  Goal: Absence of Hospital-Acquired Illness or Injury  Outcome: Met  Goal: Optimal Comfort and Wellbeing  Outcome: Met  Goal: Readiness for Transition of Care  Outcome: Met     Problem: ARDS (Acute Respiratory Distress Syndrome)  Goal: Effective Oxygenation  Outcome: Met     Problem: Infection  Goal: Absence of Infection Signs and Symptoms  Outcome: Met     Problem: Diabetes Comorbidity  Goal: Blood Glucose Level Within Targeted Range  Outcome: Met     Problem: Fall Injury Risk  Goal: Absence of Fall and Fall-Related Injury  Outcome: Met

## 2025-08-13 ENCOUNTER — OFFICE VISIT (OUTPATIENT)
Dept: CARDIOLOGY | Facility: CLINIC | Age: 70
End: 2025-08-13
Payer: OTHER GOVERNMENT

## 2025-08-13 VITALS
OXYGEN SATURATION: 95 % | HEART RATE: 63 BPM | SYSTOLIC BLOOD PRESSURE: 99 MMHG | BODY MASS INDEX: 36.55 KG/M2 | HEIGHT: 65 IN | WEIGHT: 219.38 LBS | DIASTOLIC BLOOD PRESSURE: 64 MMHG

## 2025-08-13 DIAGNOSIS — I48.20 CHRONIC ATRIAL FIBRILLATION: ICD-10-CM

## 2025-08-13 DIAGNOSIS — E66.01 SEVERE OBESITY (BMI 35.0-39.9) WITH COMORBIDITY: ICD-10-CM

## 2025-08-13 DIAGNOSIS — J44.9 CHRONIC OBSTRUCTIVE PULMONARY DISEASE, UNSPECIFIED COPD TYPE: ICD-10-CM

## 2025-08-13 DIAGNOSIS — I25.10 ARTERIOSCLEROSIS OF CORONARY ARTERY: ICD-10-CM

## 2025-08-13 DIAGNOSIS — Z87.09 HISTORY OF COPD: Primary | ICD-10-CM

## 2025-08-13 DIAGNOSIS — I73.9 PVD (PERIPHERAL VASCULAR DISEASE): ICD-10-CM

## 2025-08-13 DIAGNOSIS — Z98.890 HISTORY OF CORONARY ANGIOGRAM: ICD-10-CM

## 2025-08-13 DIAGNOSIS — K74.60 HEPATIC CIRRHOSIS, UNSPECIFIED HEPATIC CIRRHOSIS TYPE, UNSPECIFIED WHETHER ASCITES PRESENT: ICD-10-CM

## 2025-08-13 DIAGNOSIS — I50.20 HFREF (HEART FAILURE WITH REDUCED EJECTION FRACTION): ICD-10-CM

## 2025-08-13 PROCEDURE — 99214 OFFICE O/P EST MOD 30 MIN: CPT | Mod: PBBFAC | Performed by: INTERNAL MEDICINE

## 2025-08-13 PROCEDURE — 99999 PR PBB SHADOW E&M-EST. PATIENT-LVL IV: CPT | Mod: PBBFAC,,, | Performed by: INTERNAL MEDICINE

## 2025-08-13 RX ORDER — POTASSIUM CHLORIDE 20 MEQ/1
20 TABLET, EXTENDED RELEASE ORAL
COMMUNITY
Start: 2025-03-14

## 2025-08-13 RX ORDER — POLYETHYLENE GLYCOL 3350 17 G/17G
17 POWDER, FOR SOLUTION ORAL DAILY PRN
COMMUNITY
Start: 2025-06-25 | End: 2026-06-25

## 2025-08-13 RX ORDER — ASPIRIN 81 MG/1
81 TABLET ORAL EVERY MORNING
COMMUNITY
Start: 2025-03-14

## 2025-08-13 RX ORDER — ASPIRIN 325 MG/1
100 TABLET, FILM COATED ORAL
COMMUNITY
Start: 2025-06-25

## 2025-08-13 RX ORDER — DIPHENHYDRAMINE HCL 25 MG
25 CAPSULE ORAL EVERY 6 HOURS PRN
COMMUNITY
Start: 2025-05-19

## 2025-08-13 RX ORDER — SACUBITRIL AND VALSARTAN 24; 26 MG/1; MG/1
1 TABLET, FILM COATED ORAL 2 TIMES DAILY
COMMUNITY

## 2025-08-13 RX ORDER — TIOTROPIUM BROMIDE AND OLODATEROL 3.124; 2.736 UG/1; UG/1
SPRAY, METERED RESPIRATORY (INHALATION)
COMMUNITY

## 2025-08-13 RX ORDER — ALBUTEROL SULFATE 90 UG/1
INHALANT RESPIRATORY (INHALATION)
COMMUNITY

## (undated) DEVICE — APPLICATOR STRL COT 2INNR 6IN

## (undated) DEVICE — TRAY CATH FOL SIL URIMTR 16FR

## (undated) DEVICE — PENCIL E-Z CLEAN ROCKER 15FT

## (undated) DEVICE — PAD DEFIB RADIOTRANSPARENT

## (undated) DEVICE — BETADINE OPTHALMIC SOL 5% 30ML

## (undated) DEVICE — KIT SURGICAL TURNOVER

## (undated) DEVICE — SOL IRRI STRL WATER 1000ML

## (undated) DEVICE — KNIFE SURG LASEREDGE + 1.1MM

## (undated) DEVICE — Device

## (undated) DEVICE — KIT GLIDESHEATH SLEND 6FR 10CM

## (undated) DEVICE — XPERIENCE

## (undated) DEVICE — SUT 0 27IN CHROMIC GUT CT-1

## (undated) DEVICE — KIT IRR SUCTION HND PIECE

## (undated) DEVICE — BOWL STERILE LARGE 32OZ

## (undated) DEVICE — TOWEL OR DISP STRL BLUE 4/PK

## (undated) DEVICE — KIT CANIST SUCTION 1200CC

## (undated) DEVICE — GLOVE PROTEXIS HYDROGEL SZ8.5

## (undated) DEVICE — TUBING O2 FEMALE CONN 13FT

## (undated) DEVICE — FORCEP BX LG CAP 2.8MMX240CM

## (undated) DEVICE — PACK CATARACT BAUSCH AND LOMB

## (undated) DEVICE — GOWN POLY REINF X-LONG 2XL

## (undated) DEVICE — BAG LABGUARD BIOHAZARD 6X9IN

## (undated) DEVICE — GLOVE PROTEXIS LTX MICRO  7

## (undated) DEVICE — SLEEVE OPTH 2.4MM

## (undated) DEVICE — KIT SURGICAL COLON .25 1.1OZ

## (undated) DEVICE — DECANTER FLUID TRNSF WHITE 9IN

## (undated) DEVICE — CATH OPTITORQUE RADIAL 5FR

## (undated) DEVICE — SOL NACL IRR 1000ML BTL

## (undated) DEVICE — ELECTRODE BLADE E-Z CLEAN 4IN

## (undated) DEVICE — CANNULA ADULT NASAL 7FT

## (undated) DEVICE — HANDPIECE OPTH 1.8MM

## (undated) DEVICE — TRAY SKIN SCRUB WET PREMIUM

## (undated) DEVICE — NDL FLTR 5MCRN BLNT TIP 18GX1

## (undated) DEVICE — PHACO NEEDLE

## (undated) DEVICE — SET ANGIO ACIST CVI ANGIOTOUCH

## (undated) DEVICE — GUIDEWIRE INQWIRE SE 3MM JTIP

## (undated) DEVICE — FORCEP ALLIGATOR 2.8MM W/NDL

## (undated) DEVICE — CONTAINER SPECIMEN SCREW 4OZ

## (undated) DEVICE — COLLECTION SPECIMEN NEPTUNE

## (undated) DEVICE — SWAB CULTURETTE SINGLE

## (undated) DEVICE — IMPLANTABLE DEVICE
Type: IMPLANTABLE DEVICE | Site: EYE | Status: NON-FUNCTIONAL
Removed: 2023-03-09

## (undated) DEVICE — PROTECTOR ONE-STEP ARM REG

## (undated) DEVICE — SPONGE LAP 18X18 PREWASHED

## (undated) DEVICE — CATH IMPULSE FL4 5FR 100CM

## (undated) DEVICE — CATH FL 3.5 5FR

## (undated) DEVICE — SUT 0 VICRYL PLUS CT-1 27IN

## (undated) DEVICE — ELECTRODE PATIENT RETURN DISP

## (undated) DEVICE — CULTSWAB+ AMIES W/O CHARC SNG

## (undated) DEVICE — CONTAINER SPECIMEN 4.5OZ

## (undated) DEVICE — SUT CHROMIC 2-0 CT1 36IN BR

## (undated) DEVICE — BAND TR COMP DEVICE REG 24CM

## (undated) DEVICE — PACK FLUIDICS ADAPTIVE BASIC

## (undated) DEVICE — GLOVE PROTEXIS BLUE LATEX 7

## (undated) DEVICE — TIP SUCTION YANKAUER

## (undated) DEVICE — SPONGE KERLIX ANTIMIC 6X6.75IN